# Patient Record
Sex: MALE | Race: WHITE | Employment: OTHER | URBAN - METROPOLITAN AREA
[De-identification: names, ages, dates, MRNs, and addresses within clinical notes are randomized per-mention and may not be internally consistent; named-entity substitution may affect disease eponyms.]

---

## 2017-01-05 ENCOUNTER — THERAPY VISIT (OUTPATIENT)
Dept: PHYSICAL THERAPY | Facility: CLINIC | Age: 82
End: 2017-01-05
Payer: MEDICARE

## 2017-01-05 DIAGNOSIS — M54.50 ACUTE RIGHT-SIDED LOW BACK PAIN WITHOUT SCIATICA: Primary | ICD-10-CM

## 2017-01-05 PROCEDURE — 97110 THERAPEUTIC EXERCISES: CPT | Mod: GP | Performed by: PHYSICAL THERAPIST

## 2017-01-05 PROCEDURE — 97112 NEUROMUSCULAR REEDUCATION: CPT | Mod: GP | Performed by: PHYSICAL THERAPIST

## 2017-01-06 ENCOUNTER — TELEPHONE (OUTPATIENT)
Dept: FAMILY MEDICINE | Facility: CLINIC | Age: 82
End: 2017-01-06

## 2017-01-06 ENCOUNTER — OFFICE VISIT (OUTPATIENT)
Dept: FAMILY MEDICINE | Facility: CLINIC | Age: 82
End: 2017-01-06
Payer: MEDICARE

## 2017-01-06 VITALS
HEART RATE: 82 BPM | TEMPERATURE: 97.9 F | SYSTOLIC BLOOD PRESSURE: 124 MMHG | DIASTOLIC BLOOD PRESSURE: 82 MMHG | WEIGHT: 161.9 LBS | OXYGEN SATURATION: 100 % | BODY MASS INDEX: 26.02 KG/M2 | HEIGHT: 66 IN

## 2017-01-06 DIAGNOSIS — I10 BENIGN ESSENTIAL HYPERTENSION: ICD-10-CM

## 2017-01-06 DIAGNOSIS — E11.51 TYPE 2 DIABETES MELLITUS WITH DIABETIC PERIPHERAL ANGIOPATHY WITHOUT GANGRENE, WITHOUT LONG-TERM CURRENT USE OF INSULIN (H): ICD-10-CM

## 2017-01-06 DIAGNOSIS — G31.84 MILD COGNITIVE IMPAIRMENT: ICD-10-CM

## 2017-01-06 DIAGNOSIS — F32.A CHRONIC DEPRESSIVE DISORDER: Primary | ICD-10-CM

## 2017-01-06 DIAGNOSIS — K85.90 ACUTE PANCREATITIS, UNSPECIFIED COMPLICATION STATUS, UNSPECIFIED PANCREATITIS TYPE: ICD-10-CM

## 2017-01-06 DIAGNOSIS — I71.40 ABDOMINAL AORTIC ANEURYSM (AAA) WITHOUT RUPTURE (H): Chronic | ICD-10-CM

## 2017-01-06 PROCEDURE — 99214 OFFICE O/P EST MOD 30 MIN: CPT | Performed by: INTERNAL MEDICINE

## 2017-01-06 RX ORDER — DULOXETIN HYDROCHLORIDE 30 MG/1
CAPSULE, DELAYED RELEASE ORAL
Qty: 180 CAPSULE | Refills: 3 | OUTPATIENT
Start: 2017-01-06

## 2017-01-06 RX ORDER — DULOXETIN HYDROCHLORIDE 30 MG/1
30 CAPSULE, DELAYED RELEASE ORAL DAILY
Qty: 30 CAPSULE | Refills: 3 | Status: SHIPPED | OUTPATIENT
Start: 2017-01-06 | End: 2017-01-06

## 2017-01-06 RX ORDER — DULOXETIN HYDROCHLORIDE 30 MG/1
30 CAPSULE, DELAYED RELEASE ORAL DAILY
Qty: 30 CAPSULE | Refills: 3 | Status: SHIPPED | OUTPATIENT
Start: 2017-01-06 | End: 2017-01-20

## 2017-01-06 RX ORDER — DULOXETIN HYDROCHLORIDE 30 MG/1
60 CAPSULE, DELAYED RELEASE ORAL DAILY
Qty: 30 CAPSULE | Refills: 3 | Status: SHIPPED | OUTPATIENT
Start: 2017-01-06 | End: 2017-01-06

## 2017-01-06 NOTE — PROGRESS NOTES
SUBJECTIVE:                                                    Dustin Pearce is a 88 year old male who presents to clinic today for the following health issues:          Hospital Follow-up Visit:    Hospital/Nursing Home/IP Rehab Facility: [facility]  Date of Admission: 12-  Date of Discharge: 12-  Reason(s) for Admission: Essential hypertension, benign  - Primary, Acute pancreatitis, unspecified complication status, unspecified pancreatitis type, Other acute gastritis without hemorrhage             Problems taking medications regularly:  None       Medication changes since discharge: None       Problems adhering to non-medication therapy:  None    Summary of hospitalization:  [facility] discharge summary reviewed  Diagnostic Tests/Treatments reviewed.  Follow up needed: blood pressure management, follow up aortic aneurysm   Other Healthcare Providers Involved in Patient s Care:         None  Update since discharge: improved.     Post Discharge Medication Reconciliation: discharge medications reconciled and changed, per note/orders (see AVS).  Plan of care communicated with patient     Coding guidelines for this visit:  Type of Medical   Decision Making Face-to-Face Visit       within 7 Days of discharge Face-to-Face Visit        within 14 days of discharge   Moderate Complexity [id number]   High Complexity [id number]            This is a pleasant 88-year-old man with a history of mild cognitive impairment he also has multiple other medical problems including type 2 diabetes, hypertension, hyperlipidemia, chronic depressive disorder, recently diagnosed abdominal aortic aneurysm. He was recently hospitalized for abdominal pain. The etiology of his abdominal pain was difficult to assess. There was some consideration given to acute pancreatitis as the potential etiology of his pain although he was also started on higher dose proton pump inhibitor therapy due to concerns for  possible gastritis. His lipase level was only mildly elevated. He is feeling better now and denies current abdominal pain, nausea or vomiting. His ramipril was discontinued because of concern that it may be contributing to pancreatitis? He was started on metoprolol because of concern for the newly diagnosed abdominal aortic aneurysm. He establish care with me several months ago at which point he was taking tramadol and amitriptyline. Given his cognitive dysfunction and his age, I did not think that either medication was a very good choice for him. I asked him to discontinue tramadol and amitriptyline. Since stopping amitriptyline, he has been feeling more depressed. He also describes pain in multiple regions of his body that has been present for many years.    Problem list and histories reviewed & adjusted, as indicated.  Additional history: as documented    Patient Active Problem List   Diagnosis     Coronary artery disease involving native coronary artery of native heart without angina pectoris     Type 2 diabetes mellitus with diabetic peripheral angiopathy without gangrene, without long-term current use of insulin (H)     Mild cognitive impairment     Hyperlipidemia LDL goal <70     Nephrolithiasis     Benign non-nodular prostatic hyperplasia with lower urinary tract symptoms     Gastroesophageal reflux disease without esophagitis     Cerebrovascular accident (H)     Carotid artery stenosis     Abdominal aortic aneurysm (H)     Acute pancreatitis     Lumbar back pain     Acute right-sided low back pain without sciatica     Benign essential hypertension     Past Surgical History   Procedure Laterality Date     Cholecystectomy       Hernia repair       Genitourinary surgery       KIDNEY STONE REMOVAL X 4     Laser holmium lithotripsy ureter(s), insert stent, combined  3/2/2012     Procedure:COMBINED CYSTOSCOPY, URETEROSCOPY, LASER HOLMIUM LITHOTRIPSY URETER(S), INSERT STENT; CYSTOSCOPY, RIGHT RETROGRADES, RIGHT  URETEROSCOPY, HOLMIUM LASER, RIGHT STENT PLACEMENT; Surgeon:ANJELICA LEÓN; Location: OR     Rotator cuff repair rt/lt       Esophagoscopy, gastroscopy, duodenoscopy (egd), combined N/A 12/26/2016     Procedure: COMBINED ESOPHAGOSCOPY, GASTROSCOPY, DUODENOSCOPY (EGD);  Surgeon: Nasim Louis MD;  Location:  GI     Hc ugi endoscopy w eus N/A 12/29/2016     Procedure: COMBINED ENDOSCOPIC ULTRASOUND, ESOPHAGOSCOPY, GASTROSCOPY, DUODENOSCOPY (EGD);  Surgeon: Nasim Louis MD;  Location:  GI       Social History   Substance Use Topics     Smoking status: Former Smoker -- 1.00 packs/day for 30 years     Types: Cigarettes     Quit date: 01/01/1999     Smokeless tobacco: Never Used     Alcohol Use: 4.2 oz/week     7 Standard drinks or equivalent per week     Family History   Problem Relation Age of Onset     Heart Failure Father 81     Breast Cancer Mother          Current Outpatient Prescriptions   Medication Sig Dispense Refill     DULoxetine (CYMBALTA) 30 MG EC capsule Take 2 capsules (60 mg) by mouth daily 30 capsule 3     metoprolol (LOPRESSOR) 50 MG tablet Take 1 tablet (50 mg) by mouth 2 times daily 60 tablet 1     omeprazole (PRILOSEC) 40 MG capsule Take 1 capsule (40 mg) by mouth daily Take 30-60 minutes before a meal. 90 capsule 0     glipiZIDE (GLIPIZIDE XL) 10 MG 24 hr tablet Take 1 tablet (10 mg) by mouth daily 90 tablet 3     TAMSULOSIN HCL PO Take 0.4 mg by mouth daily        METFORMIN HCL PO Take 1,000 mg by mouth 2 times daily (with meals)       ATORVASTATIN CALCIUM PO Take 80 mg by mouth daily       blood glucose monitoring (NO BRAND SPECIFIED) test strip Use to test blood sugar three times daily or as directed. 300 each 3     order for DME Equipment being ordered: True Matrix Blood Glucose meter. 1 Device 0     Psyllium (METAMUCIL PO) Take 1 packet by mouth daily        VITAMIN D, CHOLECALCIFEROL, PO Take 1,000 Units by mouth daily        Clopidogrel Bisulfate, [id number],  "(PLAVIX PO) Take 75 mg by mouth daily        Allergies   Allergen Reactions     Oxycodone Other (See Comments)     \"TERRIBLE SWEATING\"     Sulfa Drugs      Tetracycline        ROS:  Denies fevers, chills, he is seeing podiatry for chronic foot pain, no blood in stools or melena    OBJECTIVE:                                                    /82 mmHg  Pulse 82  Temp(Src) 97.9  F (36.6  C) (Oral)  Ht 5' 6\" (1.676 m)  Wt 161 lb 14.4 oz (73.437 kg)  BMI 26.14 kg/m2  SpO2 100%  Body mass index is 26.14 kg/(m^2).  Gen.: This is a well-appearing elderly man in no acute distress. He does not appear toxic. Abdomen: Soft, nondistended, nontender, bowel sounds present. Mental status: Well groomed, normal affect, describes depressed mood, he is filling out a PHQ 9 form at the time of this dictation. He denies suicidal ideation. He has difficulty articulating his mood symptoms. Overall, I believe the history is challenging due to possible progression of his cognitive deficits.    Diagnostic Test Results:  Results for orders placed or performed during the hospital encounter of 12/24/16   UPPER GI ENDOSCOPY   Result Value Ref Range    Upper GI Endoscopy       [facility] Department  _______________________________________________________________________________  Patient Name: Dustin Pearce         Procedure Date: 12/26/2016 2:53 PM  MRN: [id number]                       Account Number: [id number]  YOB: 1928              Admit Type: Inpatient  Age: 88                               Room: 1                          Note Status: Finalized                Attending MD: Nasim Louis MD  Instrument Name: 313 GIF- Gastroscope   _______________________________________________________________________________     Procedure:           Upper GI endoscopy  Indications:         Epigastric abdominal pain, Dyspepsia  Providers:           Nasim Louis MD, Sruthi Vallejo RN, " Daylin Davis RN  Referring MD:          Medicines:           Midazolam 1 mg IV, Fentanyl 25 micrograms IV, Cetacaine                        spray  Complications:       No immediate complications.  ___ ____________________________________________________________________________  Procedure:           Pre-Anesthesia Assessment:                       - Prior to the procedure, a History and Physical was                        performed, and patient medications and allergies were                        reviewed. The patient is competent. The risks and                        benefits of the procedure and the sedation options and                        risks were discussed with the patient. All questions                        were answered and informed consent was obtained. Patient                        identification and proposed procedure were verified by                        the physician in the pre-procedure area. Mental Status                        Examination: alert and oriented. Airway Examination:                        normal oropharyngeal airway and neck mobility.                        Respiratory Examination: clear to auscultation. CV                        Examination: nor mal. Prophylactic Antibiotics: The                        patient does not require prophylactic antibiotics. Prior                        Anticoagulants: The patient has taken no previous                        anticoagulant or antiplatelet agents. ASA Grade                        Assessment: II - A patient with mild systemic disease.                        After reviewing the risks and benefits, the patient was                        deemed in satisfactory condition to undergo the                        procedure. The anesthesia plan was to use moderate                        sedation / analgesia (conscious sedation). Immediately                        prior to administration of medications, the patient  was                        re-assessed for adequacy to receive sedatives. The heart                        rate, respiratory rate, oxygen saturations, blood                        pressure, adequacy of pulmonary ventilation, and                        response to care were monitored th roughout the                        procedure. The physical status of the patient was                        re-assessed after the procedure.                       After obtaining informed consent, the endoscope was                        passed under direct vision. Throughout the procedure,                        the patient's blood pressure, pulse, and oxygen                        saturations were monitored continuously. The Endoscope                        was introduced through the mouth, and advanced to the                        second part of duodenum. The upper GI endoscopy was                        accomplished without difficulty. The patient tolerated                        the procedure well.                                                                                   Findings:       The examined esophagus was normal.       Diffuse mildly erythematous mucosa without bleeding was found in the        gastric antrum.       The cardia and gastric fundus were normal on ret roflexion.       The duodenal bulb, first part of the duodenum and 2nd part of the        duodenum were normal.                                                                                   Impression:          - Normal esophagus.                       - Erythematous mucosa in the antrum.                       - Normal duodenal bulb, first part of the duodenum and                        2nd part of the duodenum.  Recommendation:      - Anti Relux Precautions                       - Use Protonix (pantoprazole) 40 mg PO daily.                       - Return patient to hospital [facility].                                                                                      __________________  Nasim Louis MD  12/26/2016 4:06 PM  Number of Addenda: 0    Note Initiated On: 12/26/2016 2:53 PM  Scope Withdrawal Time: 0 hours 0 minutes 0 seconds   Total Procedure Duration: 0 hours 2 minutes 7 seconds      UPPER EUS   Result Value Ref Range    Upper EUS       [facility] Endoscopy Department  _______________________________________________________________________________  Patient Name: Dustin Pearce         Procedure Date: 12/29/2016 8:27 AM  MRN: [id number]                       Account Number: [id number]  YOB: 1928              Admit Type: Inpatient  Age: 88                               Room: special procedure room #1  Note Status: Finalized                Attending MD: Nasim Louis MD  Instrument Name: 328 [id number] Linear EUS   _______________________________________________________________________________     Procedure:                Upper EUS  Indications:              Abnormal abdominal/pelvic CT scan, Epigastric                             abdominal pain  Providers:                Nasim Louis MD, Chloe Carvalho RN  Referring MD:               Medicines:                Monitored Anesthesia Care  Complications:            No immediate complications.  ______________________________________ _________________________________________  Procedure:                Pre-Anesthesia Assessment:                            - Prior to the procedure, a History and Physical                             was performed, and patient medications and                             allergies were reviewed. The patient is competent.                             The risks and benefits of the procedure and the                             sedation options and risks were discussed with the                             patient. All questions were answered and informed                             consent was obtained. Patient identification and                              proposed procedure were verified by in the                             pre-procedure area. Mental Status Examination:                             alert and oriented. Airway Examination: normal                             oropharyngeal airway and neck mobility. Respiratory                             Examination: clear to auscultation. CV  Examination:                             normal. Prophylactic Antibiotics: The patient does                             not require prophylactic antibiotics. Prior                             Anticoagulants: The patient has taken no previous                             anticoagulant or antiplatelet agents. ASA Grade                             Assessment: III - A patient with severe systemic                             disease. After reviewing the risks and benefits,                             the patient was deemed in satisfactory condition to                             undergo the procedure. The anesthesia plan was to                             use monitored anesthesia care (MAC). Immediately                             prior to administration of medications, the patient                             was re-assessed for adequacy to receive sedatives.                             The heart rate, respiratory rate, oxygen                             saturations, blood pressure, adequacy  of pulmonary                             ventilation, and response to care were monitored                             throughout the procedure. The physical status of                             the patient was re-assessed after the procedure.                            After obtaining informed consent, the endoscope was                             passed under direct vision. Throughout the                             procedure, the patient's blood pressure, pulse, and                             oxygen saturations were monitored continuously. The                              Endosonoscope was introduced through the mouth, and                             advanced to the third part of duodenum. The upper                             EUS was accomplished without difficulty. The                             patient tolerated the procedure well.                                                                                   Findings:       Endosonographic Finding :       There was no sign  of significant endosonographic abnormality in the        esophagus.       Endosonographic images of the stomach were unremarkable.       There was no sign of significant endosonographic abnormality in the        duodenal bulb, in the apex of the duodenal bulb and in the second        portion of the duodenum.       There was no sign of significant endosonographic abnormality in the        common bile duct. CBD was 5 mm       There was no sign of significant endosonographic abnormality in the        entire pancreas. PD was 2.2 mm       Two benign-appearing lymph nodes were visualized in the celiac region        (level 20). The largest measured 4 mm by 7 mm in maximal cross-sectional        diameter. The nodes were oval, hypoechoic and had well defined margins.       Multiple renal cysts seen.                                                                                   Impression:               - There was no sign of significant pathology in the                             esophagus.                             - Endosonographic images of the stomach were                             unremarkable.                            - There was no sign of significant pathology in the                             duodenal bulb, in the apex of the duodenal bulb and                             in the second portion of the duodenum.                            - There was no sign of significant pathology in the                             common bile duct.                            - There was no sign of significant  pathology in the                             entire pancreas.                            - Two benign lymph nodes were visualized in the                             celiac region (level 20). Tissue has not been                             obtained. However, the endosonographic appearance                             is benign inflammatory changes.  Recommendation:           - Please continue to follow with Primary care                             Physician.                             - Return patient to hospital am[facility].                                                                                   Procedure Code(s):       --- Professional ---       37778, Esophagogastroduodenoscopy, flexible, transoral; with endoscopic        ultrasound examination, including the esophagus, stomach, and either the        duodenum or a surgically altered stomach where the jejunum is examined        distal to the anastomosis  Diagnosis Code(s):       --- Professional ---       I89.9, Noninfective disorder of lymphatic vessels and lymph nodes,        unspecified       R93.5, Abnormal findings on diagnostic imaging of other abdominal        regions, including retroperitoneum       R10.13, Epigastric pain    CPT copyright 2013 [employer]. All rights reserved.    The codes documented in this report are preliminary and upon  review may   be revised to meet current compliance requirements.    __________________  Nasim Louis MD  12/29/2016 9:59 AM   I was physically present for the entire viewing portion of the exam.  Nasim Louis MD  Number of Addenda: 0    Note Initiated On: 12/29/2016 8:27 AM  MRN:                      [id number]  Procedure Date:           12/29/2016 8:27:04 AM  Scope Withdrawal Time: 0 hours 0 minutes 0 seconds   Total Procedure Duration: 0 hours 16 minutes 25 seconds   Estimated Blood Loss:       Scope In: 8:45:14 AM  Scope Out: 9:01:39 AM     CT Abdomen Pelvis w Contrast    Narrative    CT  ABDOMEN PELVIS WITH CONTRAST   12/25/2016 1:12 AM      HISTORY:  Epigastric abdominal pain.    TECHNIQUE:  CT abdomen and pelvis with intravenous contrast. Radiation  dose for this scan was reduced using automated exposure control,  adjustment of the mA and/or kV according to patient size, or iterative  reconstruction technique. 81mL Isovue-370     COMPARISON:  11/14/2013.    FINDINGS:  Abdomen:  There is atelectasis and scarring at the lung bases. The  heart size is normal. The liver and spleen are normal in appearance.  There is fluid stranding about the head of the pancreas extending into  the mesentery consistent with acute pancreatitis. The pancreas  otherwise appears normal. No focal fluid collection to suggest  pseudocyst. The adrenal glands are normal in appearance. There are a  few renal cortical cysts and renal stones bilaterally. No ureteral  stone or hydronephrosis. There is atherosclerotic calcification of the  aorta and its branches. The mid abdominal aorta is mildly aneurysmal  at 3.2 cm AP.    Pelvis: There are colonic diverticula without acute diverticulitis. No  bowel obstruction or inflammation. No free intraperitoneal gas or  fluid. 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      Impression    IMPRESSION:  1. Probable acute pancreatitis of the pancreas head. No pseudocyst.  2. Colonic diverticula without acute diverticulitis.  3. Bilateral renal stones. No ureteral stone or hydronephrosis.  4. 3.2 cm abdominal aortic aneurysm.    WILFRED SALAZAR MD   US Abdomen Limited    Narrative    US ABDOMEN LIMITED  12/25/2016 1:53 PM     HISTORY: Pancreatitis.    COMPARISON: CT scan from 12/25/2016.    FINDINGS: Gallbladder is surgically absent. Common duct is normal at  0.5 cm. Liver is normal in size and echotexture. Visualized portions  of the pancreas are unremarkable. There is pancreatitis present on the  CT scan. Right kidney is normal.      Impression    IMPRESSION: The area of pancreatitis seen on the CT  scan is not yet  detectable by ultrasound. Otherwise unremarkable.    JAMES ZEPEDA MD   CBC with platelets differential   Result Value Ref Range    WBC 10.2 4.0 - 11.0 10e9/L    RBC Count 4.15 (L) 4.4 - 5.9 10e12/L    Hemoglobin 13.1 (L) 13.3 - 17.7 g/dL    Hematocrit 39.4 (L) 40.0 - 53.0 %    MCV 95 78 - 100 fl    MCH 31.6 26.5 - 33.0 pg    MCHC 33.2 31.5 - 36.5 g/dL    RDW 13.2 10.0 - 15.0 %    Platelet Count 156 150 - 450 10e9/L    Diff Method Automated Method     % Neutrophils 69.2 %    % Lymphocytes 19.9 %    % Monocytes 9.9 %    % Eosinophils 0.7 %    % Basophils 0.1 %    % Immature Granulocytes 0.2 %    Nucleated RBCs 0 0 /100    Absolute Neutrophil 7.0 1.6 - 8.3 10e9/L    Absolute Lymphocytes 2.0 0.8 - 5.3 10e9/L    Absolute Monocytes 1.0 0.0 - 1.3 10e9/L    Absolute Eosinophils 0.1 0.0 - 0.7 10e9/L    Absolute Basophils 0.0 0.0 - 0.2 10e9/L    Abs Immature Granulocytes 0.0 0 - 0.4 10e9/L    Absolute Nucleated RBC 0.0    Comprehensive metabolic panel   Result Value Ref Range    Sodium 141 133 - 144 mmol/L    Potassium 3.8 3.4 - 5.3 mmol/L    Chloride 104 94 - 109 mmol/L    Carbon Dioxide 29 20 - 32 mmol/L    Anion Gap 8 3 - 14 mmol/L    Glucose 150 (H) 70 - 99 mg/dL    Urea Nitrogen 17 7 - 30 mg/dL    Creatinine 0.77 0.66 - 1.25 mg/dL    GFR Estimate >90  Non  GFR Calc   >60 mL/min/1.7m2    GFR Estimate If Black >90   GFR Calc   >60 mL/min/1.7m2    Calcium 8.7 8.5 - 10.1 mg/dL    Bilirubin Total 0.6 0.2 - 1.3 mg/dL    Albumin 3.7 3.4 - 5.0 g/dL    Protein Total 7.1 6.8 - 8.8 g/dL    Alkaline Phosphatase 58 40 - 150 U/L    ALT 21 0 - 70 U/L    AST 19 0 - 45 U/L   Lipase   Result Value Ref Range    Lipase 483 (H) 73 - 393 U/L   Troponin I   Result Value Ref Range    Troponin I ES 0.022 0.000 - 0.045 ug/L   UA with Microscopic   Result Value Ref Range    Color Urine Light Yellow     Appearance Urine Clear     Glucose Urine Negative NEG mg/dL    Bilirubin Urine Negative NEG     Ketones Urine 10 (A) NEG mg/dL    Specific Gravity Urine 1.042 (H) 1.003 - 1.035    Blood Urine Negative NEG    pH Urine 6.0 5.0 - 7.0 pH    Protein Albumin Urine Negative NEG mg/dL    Urobilinogen mg/dL 2.0 0.0 - 2.0 mg/dL    Nitrite Urine Negative NEG    Leukocyte Esterase Urine Negative NEG    Source Midstream Urine     WBC Urine <1 0 - 2 /HPF    RBC Urine 1 0 - 2 /HPF    Squamous Epithelial /HPF Urine <1 0 - 1 /HPF    Mucous Urine Present (A) NEG /LPF   Creatinine POCT   Result Value Ref Range    Creatinine 0.7 0.66 - 1.25 mg/dL    GFR Estimate >90 >60 mL/min/1.7m2    GFR Estimate If Black >90 >60 mL/min/1.7m2   Lipase   Result Value Ref Range    Lipase 269 73 - 393 U/L   Glucose by meter   Result Value Ref Range    Glucose 152 (H) 70 - 99 mg/dL   Glucose by meter   Result Value Ref Range    Glucose 176 (H) 70 - 99 mg/dL   Glucose by meter   Result Value Ref Range    Glucose 186 (H) 70 - 99 mg/dL   Glucose by meter   Result Value Ref Range    Glucose 174 (H) 70 - 99 mg/dL   Glucose by meter   Result Value Ref Range    Glucose 250 (H) 70 - 99 mg/dL   Glucose by meter   Result Value Ref Range    Glucose 179 (H) 70 - 99 mg/dL   Basic metabolic panel   Result Value Ref Range    Sodium 135 133 - 144 mmol/L    Potassium 5.0 3.4 - 5.3 mmol/L    Chloride 103 94 - 109 mmol/L    Carbon Dioxide 28 20 - 32 mmol/L    Anion Gap 4 3 - 14 mmol/L    Glucose 213 (H) 70 - 99 mg/dL    Urea Nitrogen 16 7 - 30 mg/dL    Creatinine 0.86 0.66 - 1.25 mg/dL    GFR Estimate 84 >60 mL/min/1.7m2    GFR Estimate If Black >90   GFR Calc   >60 mL/min/1.7m2    Calcium 8.2 (L) 8.5 - 10.1 mg/dL   CBC with platelets differential   Result Value Ref Range    WBC 11.5 (H) 4.0 - 11.0 10e9/L    RBC Count 3.69 (L) 4.4 - 5.9 10e12/L    Hemoglobin 11.8 (L) 13.3 - 17.7 g/dL    Hematocrit 35.4 (L) 40.0 - 53.0 %    MCV 96 78 - 100 fl    MCH 32.0 26.5 - 33.0 pg    MCHC 33.3 31.5 - 36.5 g/dL    RDW 13.3 10.0 - 15.0 %    Platelet Count 130 (L)  150 - 450 10e9/L    Diff Method Automated Method     % Neutrophils 77.8 %    % Lymphocytes 13.7 %    % Monocytes 8.1 %    % Eosinophils 0.1 %    % Basophils 0.1 %    % Immature Granulocytes 0.2 %    Nucleated RBCs 0 0 /100    Absolute Neutrophil 9.0 (H) 1.6 - 8.3 10e9/L    Absolute Lymphocytes 1.6 0.8 - 5.3 10e9/L    Absolute Monocytes 0.9 0.0 - 1.3 10e9/L    Absolute Eosinophils 0.0 0.0 - 0.7 10e9/L    Absolute Basophils 0.0 0.0 - 0.2 10e9/L    Abs Immature Granulocytes 0.0 0 - 0.4 10e9/L    Absolute Nucleated RBC 0.0    Lipase   Result Value Ref Range    Lipase 102 73 - 393 U/L   Hemoglobin A1c   Result Value Ref Range    Hemoglobin A1C 7.9 (H) 4.3 - 6.0 %   Glucose by meter   Result Value Ref Range    Glucose 214 (H) 70 - 99 mg/dL   Glucose by meter   Result Value Ref Range    Glucose 155 (H) 70 - 99 mg/dL   Glucose by meter   Result Value Ref Range    Glucose 167 (H) 70 - 99 mg/dL   Glucose by meter   Result Value Ref Range    Glucose 169 (H) 70 - 99 mg/dL   Glucose by meter   Result Value Ref Range    Glucose 177 (H) 70 - 99 mg/dL   CRP inflammation   Result Value Ref Range    CRP Inflammation 136.0 (H) 0.0 - 8.0 mg/L   CBC (AM Draw)   Result Value Ref Range    WBC 12.7 (H) 4.0 - 11.0 10e9/L    RBC Count 4.04 (L) 4.4 - 5.9 10e12/L    Hemoglobin 12.7 (L) 13.3 - 17.7 g/dL    Hematocrit 38.5 (L) 40.0 - 53.0 %    MCV 95 78 - 100 fl    MCH 31.4 26.5 - 33.0 pg    MCHC 33.0 31.5 - 36.5 g/dL    RDW 13.2 10.0 - 15.0 %    Platelet Count 174 150 - 450 10e9/L   Comprehensive metabolic panel   Result Value Ref Range    Sodium 138 133 - 144 mmol/L    Potassium 5.3 3.4 - 5.3 mmol/L    Chloride 104 94 - 109 mmol/L    Carbon Dioxide 27 20 - 32 mmol/L    Anion Gap 7 3 - 14 mmol/L    Glucose 203 (H) 70 - 99 mg/dL    Urea Nitrogen 15 7 - 30 mg/dL    Creatinine 0.72 0.66 - 1.25 mg/dL    GFR Estimate >90  Non  GFR Calc   >60 mL/min/1.7m2    GFR Estimate If Black >90   GFR Calc   >60 mL/min/1.7m2     Calcium 8.4 (L) 8.5 - 10.1 mg/dL    Bilirubin Total 0.7 0.2 - 1.3 mg/dL    Albumin 2.8 (L) 3.4 - 5.0 g/dL    Protein Total 6.5 (L) 6.8 - 8.8 g/dL    Alkaline Phosphatase 90 40 - 150 U/L    ALT 50 0 - 70 U/L    AST 43 0 - 45 U/L   Glucose by meter   Result Value Ref Range    Glucose 243 (H) 70 - 99 mg/dL   Glucose by meter   Result Value Ref Range    Glucose 180 (H) 70 - 99 mg/dL   Glucose by meter   Result Value Ref Range    Glucose 185 (H) 70 - 99 mg/dL   Glucose by meter   Result Value Ref Range    Glucose 140 (H) 70 - 99 mg/dL   Glucose by meter   Result Value Ref Range    Glucose 158 (H) 70 - 99 mg/dL   Glucose by meter   Result Value Ref Range    Glucose 138 (H) 70 - 99 mg/dL   Glucose by meter   Result Value Ref Range    Glucose 138 (H) 70 - 99 mg/dL   Glucose by meter   Result Value Ref Range    Glucose 252 (H) 70 - 99 mg/dL   Glucose by meter   Result Value Ref Range    Glucose 136 (H) 70 - 99 mg/dL   Glucose by meter   Result Value Ref Range    Glucose 202 (H) 70 - 99 mg/dL   Glucose by meter   Result Value Ref Range    Glucose 149 (H) 70 - 99 mg/dL   Glucose by meter   Result Value Ref Range    Glucose 167 (H) 70 - 99 mg/dL   Glucose by meter   Result Value Ref Range    Glucose 150 (H) 70 - 99 mg/dL   EKG 12 lead   Result Value Ref Range    Interpretation ECG Click View Image link to view waveform and result    Gastroenterology IP Consult: epigastric pain with nausea please asess the need for EGD; Consultant may enter orders: Yes; Patient to be seen: Routine - within 24 hours    Nasim Villegas MD     12/26/2016  7:06 PM  [facility]  Gastroenterology Consultation         Dustin Pearce  [address]  [place] [address]  88 year old male    Admission Date/Time: 12/24/2016  Primary Care Provider: Matt Morrissey  Referring / Attending Physician:  Dr. Chen     We were asked to see the patient in consultation by Dr. Chen for   evaluation of Abdominal pain,  Pancreatitis.      CC: Acute pancreatitis, Abdominal pain    HPI:  Dustin Pearce is an 88-year-old male patient with past   history of diabetes mellitus type 2, coronary artery disease,   hypertension, peripheral arterial disease, stroke history,   dyslipidemia, GERD, nephrolithiasis, who presents with abdominal   pain, and found to have findings suggesting acute pancreatitis.   Patient developed sever epigastric pain which was sudden in   onset. Patient has been dealing with chronic upper back pain due   to muscle spasm for 4 weeks. Denying h/o ETOH, fever, chills. S/P   cholecystectomy.    ROS: A comprehensive ten point review of systems was negative   aside from those in mentioned in the HPI.      PAST MED HX:  I have reviewed this patient's medical history and updated it   with pertinent information if needed.   Past Medical History   Diagnosis Date     CAD (coronary artery disease)      Hypertension      Hyperlipidemia      DJD (degenerative joint disease)      Osteopenia      Nephrolithiasis      RECURRENT     Carotid occlusion, left      Diabetes mellitus (H)      PONV (postoperative nausea and vomiting)      Heart attack (H)      Unspecified cerebral artery occlusion with cerebral infarction        Gastro-oesophageal reflux disease      Coronary artery disease involving native coronary artery of   native heart without angina pectoris 10/20/2016     Mild cognitive impairment 10/20/2016     Benign non-nodular prostatic hyperplasia with lower urinary   tract symptoms 10/20/2016     Gastroesophageal reflux disease without esophagitis 10/20/2016     Carotid artery stenosis 10/20/2016       MEDICATIONS:   Prior to Admission Medications   Prescriptions Last Dose Informant Patient Reported? Taking?   ATORVASTATIN CALCIUM PO 12/24/2016 at Unknown time  Yes Yes   Sig: Take 80 mg by mouth daily   Clopidogrel Bisulfate, [id number], (PLAVIX PO) 12/24/2016 at   Unknown time  Yes Yes   Sig: Take 75 mg by mouth daily   "  METFORMIN HCL PO 12/24/2016 at Unknown time  Yes Yes   Sig: Take 1,000 mg by mouth 2 times daily (with meals)   Omeprazole (PRILOSEC PO) 12/24/2016 at Unknown time  Yes Yes   Sig: Take 20 mg by mouth every morning    Psyllium (METAMUCIL PO) 12/24/2016 at Unknown time  Yes Yes   Sig: Take 1 packet by mouth daily    Ramipril (ALTACE PO) 12/24/2016 at Unknown time  Yes Yes   Sig: Take 2.5 mg by mouth daily    TAMSULOSIN HCL PO 12/24/2016 at Unknown time  Yes Yes   Sig: Take 0.4 mg by mouth daily    VITAMIN D, CHOLECALCIFEROL, PO 12/24/2016 at Unknown time  Yes   Yes   Sig: Take 1,000 Units by mouth daily    blood glucose monitoring (NO BRAND SPECIFIED) test strip   No No   Sig: Use to test blood sugar three times daily or as directed.   glipiZIDE (GLIPIZIDE XL) 10 MG 24 hr tablet 12/24/2016 at Unknown   time  No Yes   Sig: Take 1 tablet (10 mg) by mouth daily   naproxen (NAPROSYN) 500 MG tablet Past Month at Unknown time  No   Yes   Sig: Take 1 tablet (500 mg) by mouth 2 times daily as needed for   moderate pain   order for DME   No No   Sig: Equipment being ordered: True Matrix Blood Glucose meter.      Facility-Administered Medications: None       ALLERGIES:   Allergies   Allergen Reactions     Oxycodone Other (See Comments)     \"TERRIBLE SWEATING\"     Sulfa Drugs      Tetracycline        SOCIAL HISTORY:  Social History   Substance Use Topics     Smoking status: Former Smoker -- 1.00 packs/day for 30 years     Types: Cigarettes     Quit date: 01/01/1999     Smokeless tobacco: Never Used     Alcohol Use: 4.2 oz/week     7 Standard drinks or equivalent per week       FAMILY HISTORY:  Family History   Problem Relation Age of Onset     Heart Failure Father 81     Breast Cancer Mother        PHYSICAL EXAM:   General  Awake, alert oriented  Vital Signs with Ranges  Temp: 96.8  F (36  C) Temp src: Oral BP: 144/64 mmHg   Heart   Rate: 63 Resp: 16 SpO2: 98 % O2 Device: Nasal cannula Oxygen   Delivery: 2 LPM  I/O last 3 " completed shifts:  In: 120 [P.O.:120]  Out: 600 [Urine:400; Emesis/NG output:200]    Constitutional: healthy, alert and no distress   Cardiovascular: negative, PMI normal. No lifts, heaves, or   thrills. RRR. No murmurs, clicks gallops or rub  Respiratory: negative, Percussion normal. Good diaphragmatic   excursion. Lungs clear  Head: Normocephalic. No masses, lesions, tenderness or   abnormalities  Neck: Neck supple. No adenopathy. Thyroid symmetric, normal   size,, Carotids without bruits.  Abdomen: Abdomen soft, non-tender. BS normal. No masses,   organomegaly  SKIN: no suspicious lesions or rashes  LYMPH: Normal cervical lymph nodes          ADDITIONAL COMMENTS:   I reviewed the patient's new clinical lab test results.   Recent Labs   Lab Test  12/26/16   0643  12/25/16   0002  10/20/16   1825   08/19/09   2100   WBC  11.5*  10.2  7.5   --   6.2   HGB  11.8*  13.1*  13.4   < >  13.8   MCV  96  95  95   --   93   PLT  130*  156  175   --   134*   INR   --    --    --    --   0.99    < > = values in this interval not displayed.     Recent Labs   Lab Test  12/26/16   0643  12/25/16   0002  10/20/16   1825   POTASSIUM  5.0  3.8  4.3   CHLORIDE  103  104  104   CO2  28  29  29   BUN  16  17  14   ANIONGAP  4  8  5     Recent Labs   Lab Test  12/26/16   0643  12/25/16   0655  12/25/16   0204  12/25/16   0002  10/20/16   1825  08/20/09   1348  08/19/09   2100   ALBUMIN   --    --    --   3.7  3.7   --   4.0   BILITOTAL   --    --    --   0.6  0.4   --   0.3   ALT   --    --    --   21 26   --   31   AST   --    --    --   19 17   --   33   PROTEIN   --    --   Negative   --    --   Negative   --    LIPASE  102  269   --   483*   --    --    --        I reviewed the patient's new imaging results.        CONSULTATION ASSESSMENT AND PLAN:    Active Problems:    Acute pancreatitis    Assessment:    88 year old male with h/o cute abdominal pain and CT consistent   with focal pancreatitis involving head of pancreas. CBD is  normal   size. No obvious risk factors. D/D still can include gallstone   pancreatitis, Idiopathic, Duodenal ulcer. Neoplastic process.   Patient is feeling better regarding pain today.      Plan: I will schedule for EGD to r/o PUD and duodenal ulcer.   If negative than I will start on clear liquid diet.   Advance as tolerated.   Repeat Labs tomorrow and schedule for GI f/U and EUS as out   patient    D/W patient and family plan in detail.        Nasim Louis MD, FACP  [facility].  Office: [phone]  Cell : [phone]          ASSESSMENT/PLAN:                                                            1. Chronic depressive disorder  In lieu of amitriptyline, and in light of his chronic pain complaints, try Cymbalta as below to address his mood  - DULoxetine (CYMBALTA) 30 MG EC capsule; Take 2 capsules (60 mg) by mouth daily  Dispense: 30 capsule; Refill: 3    2. Type 2 diabetes mellitus with diabetic peripheral angiopathy without gangrene, without long-term current use of insulin (H)  His hemoglobin A1c demonstrated better control in the hospital. Although, he admits that he is only taking 10 mg of glipizide some of the time and usually only taking 5 mg of glipizide for unclear reasons. I advised him and his wife to make sure that he takes the 10 mg dose on a daily basis.    3. Abdominal aortic aneurysm (AAA) without rupture (H)  Recheck abdominal imaging in one year time interval    4. Mild cognitive impairment  This may be progressing, monitor closely    5. Acute pancreatitis, unspecified complication status, unspecified pancreatitis type  If this in fact was the diagnosis for his abdominal pain, it has resolved    6. Benign essential hypertension  His second blood pressure reading in clinic today is okay despite stopping ramipril      FUTURE APPOINTMENTS:       - Follow-up visit in 3-4 weeks to assess therapy on Cymbalta    Matt Morrissey MD  [facility]

## 2017-01-06 NOTE — NURSING NOTE
"Chief Complaint   Patient presents with     Hospital F/U       Initial /69 mmHg  Pulse 82  Temp(Src) 97.9  F (36.6  C) (Oral)  Ht 5' 6\" (1.676 m)  Wt 161 lb 14.4 oz (73.437 kg)  BMI 26.14 kg/m2  SpO2 100% Estimated body mass index is 26.14 kg/(m^2) as calculated from the following:    Height as of this encounter: 5' 6\" (1.676 m).    Weight as of this encounter: 161 lb 14.4 oz (73.437 kg).  BP completed using cuff size: regular, left arm  Sandie Nieves MA  "

## 2017-01-06 NOTE — MR AVS SNAPSHOT
After Visit Summary   1/6/2017    Dustin Pearce    MRN: [id number]           Patient Information     Date Of Birth          1/31/1928        Visit Information        Provider Department      1/6/2017 2:30 PM Matt Morrissey MD [facility]        Today's Diagnoses     Chronic depressive disorder    -  1     Type 2 diabetes mellitus with diabetic peripheral angiopathy without gangrene, without long-term current use of insulin (H)         Abdominal aortic aneurysm (AAA) without rupture (H)         Mild cognitive impairment         Acute pancreatitis, unspecified complication status, unspecified pancreatitis type         Benign essential hypertension            Follow-ups after your visit        Follow-up notes from your care team     Return in about 4 weeks (around 2/3/2017) for Routine Visit.      Your next 10 appointments already scheduled     Jan 12, 2017  6:50 PM   [facility] Sp[facility] with Lesa Marin PT   [facility] ([facility]  )    [address] [id number] [phone]              Who to contact     If you have questions or need follow up information about today's clinic visit or your schedule please contact [facility] directly at [phone].  Normal or non-critical lab and imaging results will be communicated to you by MyChart, letter or phone within 4 business days after the clinic has received the results. If you do not hear from us within 7 days, please contact the clinic through MyChart or phone. If you have a critical or abnormal lab result, we will notify you by phone as soon as possible.  Submit refill requests through [employer] or call your pharmacy and they will forward the refill request to us. Please allow 3 business days for your refill to be completed.          Additional Information About Your Visit        Care EveryWhere ID     This is your Care EveryWhere ID. This could be used by  "other organizations to access your [facility] medical records  [id number]        Your Vitals Were     Pulse Temperature Height BMI (Body Mass Index) Pulse Oximetry       82 97.9  F (36.6  C) (Oral) 5' 6\" (1.676 m) 26.14 kg/m2 100%        Blood Pressure from Last 3 Encounters:   01/06/17 124/82   12/29/16 133/57   11/25/16 130/66    Weight from Last 3 Encounters:   01/06/17 161 lb 14.4 oz (73.437 kg)   12/27/16 162 lb 11.2 oz (73.8 kg)   11/25/16 165 lb (74.844 kg)              Today, you had the following     No orders found for display         Today's Medication Changes          These changes are accurate as of: 1/6/17  2:57 PM.  If you have any questions, ask your nurse or doctor.               Start taking these medicines.        Dose/Directions    DULoxetine 30 MG EC capsule   Commonly known as:  CYMBALTA   Used for:  Chronic depressive disorder   Started by:  Matt Morrissey MD        Dose:  30 mg   Take 1 capsule (30 mg) by mouth daily   Quantity:  30 capsule   Refills:  3         Stop taking these medicines if you haven't already. Please contact your care team if you have questions.     HYDROcodone-acetaminophen 5-325 MG per tablet   Commonly known as:  NORCO   Stopped by:  Matt Morrissey MD                Where to get your medicines      These medications were sent to [id number] - [facility] [address] AT [facility] [address]  [address], [facility] [address]    Hours:  24-hours Phone:  [phone]    - DULoxetine 30 MG EC capsule             Primary Care Provider Office Phone # Fax #    Matt Morrissey [phone]       [facility] [address]  [facility] [address]        Thank you!     Thank you for choosing [facility]  for your care. Our goal is always to provide you with excellent care. Hearing back from our patients is one way we can continue to improve our services. Please take a few minutes to complete the written " survey that you may receive in the mail after your visit with us. Thank you!             Your Updated Medication List - Protect others around you: Learn how to safely use, store and throw away your medicines at www.disposemymeds.org.          This list is accurate as of: 1/6/17  2:57 PM.  Always use your most recent med list.                   Brand Name Dispense Instructions for use    ATORVASTATIN CALCIUM PO      Take 80 mg by mouth daily       blood glucose monitoring test strip    no brand specified    300 each    Use to test blood sugar three times daily or as directed.       DULoxetine 30 MG EC capsule    CYMBALTA    30 capsule    Take 1 capsule (30 mg) by mouth daily       glipiZIDE 10 MG 24 hr tablet    glipiZIDE XL    90 tablet    Take 1 tablet (10 mg) by mouth daily       METAMUCIL PO      Take 1 packet by mouth daily       METFORMIN HCL PO      Take 1,000 mg by mouth 2 times daily (with meals)       metoprolol 50 MG tablet    LOPRESSOR    60 tablet    Take 1 tablet (50 mg) by mouth 2 times daily       omeprazole 40 MG capsule    priLOSEC    90 capsule    Take 1 capsule (40 mg) by mouth daily Take 30-60 minutes before a meal.       order for DME     1 Device    Equipment being ordered: True Matrix Blood Glucose meter.       PLAVIX PO      Take 75 mg by mouth daily       TAMSULOSIN HCL PO      Take 0.4 mg by mouth daily       VITAMIN D (CHOLECALCIFEROL) PO      Take 1,000 Units by mouth daily

## 2017-01-06 NOTE — TELEPHONE ENCOUNTER
Reason for Call:  Other call back    Detailed comments: [facility] pharmacy received multiple prescriptions for the same medication (duloxetine) and would like clarification    Phone Number Patient can be reached at: Home number on file [phone] (home)    Best Time:     Can we leave a detailed message on this number? YES    Call taken on 1/6/2017 at 5:01 PM by Yony Schwartz

## 2017-01-07 ASSESSMENT — PATIENT HEALTH QUESTIONNAIRE - PHQ9: SUM OF ALL RESPONSES TO PHQ QUESTIONS 1-9: 17

## 2017-01-09 NOTE — TELEPHONE ENCOUNTER
Dr. Morrissey,  Is the correct dosage 1 capsule daily? Or 2? Please advise so we can call Pharmacy

## 2017-01-12 ENCOUNTER — THERAPY VISIT (OUTPATIENT)
Dept: PHYSICAL THERAPY | Facility: CLINIC | Age: 82
End: 2017-01-12
Payer: MEDICARE

## 2017-01-12 DIAGNOSIS — M54.50 ACUTE RIGHT-SIDED LOW BACK PAIN WITHOUT SCIATICA: Primary | ICD-10-CM

## 2017-01-12 PROCEDURE — 97112 NEUROMUSCULAR REEDUCATION: CPT | Mod: GP | Performed by: PHYSICAL THERAPIST

## 2017-01-12 PROCEDURE — 97110 THERAPEUTIC EXERCISES: CPT | Mod: GP | Performed by: PHYSICAL THERAPIST

## 2017-01-16 ENCOUNTER — HOSPITAL ENCOUNTER (EMERGENCY)
Facility: CLINIC | Age: 82
Discharge: HOME | End: 2017-01-16
Attending: EMERGENCY MEDICINE | Admitting: EMERGENCY MEDICINE
Payer: MEDICARE

## 2017-01-16 VITALS
OXYGEN SATURATION: 97 % | HEIGHT: 65 IN | WEIGHT: 155 LBS | TEMPERATURE: 98.5 F | BODY MASS INDEX: 25.83 KG/M2 | RESPIRATION RATE: 20 BRPM | DIASTOLIC BLOOD PRESSURE: 64 MMHG | SYSTOLIC BLOOD PRESSURE: 125 MMHG

## 2017-01-16 DIAGNOSIS — R42 DIZZINESS: ICD-10-CM

## 2017-01-16 LAB
ANION GAP SERPL CALCULATED.3IONS-SCNC: 6 MMOL/L (ref 3–14)
BASOPHILS # BLD AUTO: 0 10E9/L (ref 0–0.2)
BASOPHILS NFR BLD AUTO: 0.2 %
BUN SERPL-MCNC: 14 MG/DL (ref 7–30)
CALCIUM SERPL-MCNC: 8.9 MG/DL (ref 8.5–10.1)
CHLORIDE SERPL-SCNC: 101 MMOL/L (ref 94–109)
CO2 SERPL-SCNC: 30 MMOL/L (ref 20–32)
CREAT SERPL-MCNC: 0.72 MG/DL (ref 0.66–1.25)
DIFFERENTIAL METHOD BLD: ABNORMAL
EOSINOPHIL # BLD AUTO: 0.1 10E9/L (ref 0–0.7)
EOSINOPHIL NFR BLD AUTO: 1.2 %
ERYTHROCYTE [DISTWIDTH] IN BLOOD BY AUTOMATED COUNT: 12.9 % (ref 10–15)
GFR SERPL CREATININE-BSD FRML MDRD: ABNORMAL ML/MIN/1.7M2
GLUCOSE SERPL-MCNC: 337 MG/DL (ref 70–99)
HCT VFR BLD AUTO: 39.3 % (ref 40–53)
HGB BLD-MCNC: 13 G/DL (ref 13.3–17.7)
IMM GRANULOCYTES # BLD: 0 10E9/L (ref 0–0.4)
IMM GRANULOCYTES NFR BLD: 0.2 %
INTERPRETATION ECG - MUSE: NORMAL
LYMPHOCYTES # BLD AUTO: 1.5 10E9/L (ref 0.8–5.3)
LYMPHOCYTES NFR BLD AUTO: 28.7 %
MCH RBC QN AUTO: 31.1 PG (ref 26.5–33)
MCHC RBC AUTO-ENTMCNC: 33.1 G/DL (ref 31.5–36.5)
MCV RBC AUTO: 94 FL (ref 78–100)
MONOCYTES # BLD AUTO: 0.4 10E9/L (ref 0–1.3)
MONOCYTES NFR BLD AUTO: 8.3 %
NEUTROPHILS # BLD AUTO: 3.2 10E9/L (ref 1.6–8.3)
NEUTROPHILS NFR BLD AUTO: 61.4 %
NRBC # BLD AUTO: 0 10*3/UL
NRBC BLD AUTO-RTO: 0 /100
PLATELET # BLD AUTO: 175 10E9/L (ref 150–450)
POTASSIUM SERPL-SCNC: 4.9 MMOL/L (ref 3.4–5.3)
RBC # BLD AUTO: 4.18 10E12/L (ref 4.4–5.9)
SODIUM SERPL-SCNC: 137 MMOL/L (ref 133–144)
WBC # BLD AUTO: 5.2 10E9/L (ref 4–11)

## 2017-01-16 PROCEDURE — 80048 BASIC METABOLIC PNL TOTAL CA: CPT | Performed by: EMERGENCY MEDICINE

## 2017-01-16 PROCEDURE — 93005 ELECTROCARDIOGRAM TRACING: CPT

## 2017-01-16 PROCEDURE — 99284 EMERGENCY DEPT VISIT MOD MDM: CPT

## 2017-01-16 PROCEDURE — 85025 COMPLETE CBC W/AUTO DIFF WBC: CPT | Performed by: EMERGENCY MEDICINE

## 2017-01-16 ASSESSMENT — ENCOUNTER SYMPTOMS
HEADACHES: 0
DIZZINESS: 1
LIGHT-HEADEDNESS: 1
SPEECH DIFFICULTY: 0
WEAKNESS: 0
BLOOD IN STOOL: 0
PALPITATIONS: 0
ABDOMINAL PAIN: 1
TROUBLE SWALLOWING: 0

## 2017-01-16 NOTE — ED AVS SNAPSHOT
Emergency Department    [address] [facility] [address]    Phone:  [phone]    Fax:  [phone]                                       Dustin Pearce   MRN: [id number]    Department:   Emergency Department   Date of Visit:  1/16/2017           Patient Information     Date Of Birth          1/31/1928        Your diagnoses for this visit were:     Dizziness        You were seen by Mikel Lomas MD.      Follow-up Information     Follow up with Matt Morrissey MD.    Specialty:  Internal Medicine    Why:  keep appointment;  recheck ed, If symptoms reoccur    Contact information:    [facility]  [address] [place] [address]  [phone]          Discharge Instructions         Dizziness (Uncertain Cause)  Dizziness is a common symptom. It may be described as lightheadedness, spinning, or feeling like you are going to faint. Dizziness can have many causes.  Be sure to tell the healthcare provider about:    All medicines you take, including prescription, over-the-counter, herbs, and supplements    Any other symptoms you have    Any health problems you are being treated for    Anything that causes the dizziness to get worse or better  Today's exam did not show an exact cause for your dizziness. Other tests may be needed. Follow up with your healthcare provider.  Home care    Dizziness that occurs with sudden standing may be a sign of mild dehydration. Drink extra fluids for the next few days.    If you recently started a new medicine, stopped a medicine, or had the dose of a current medicine changed, talk with the prescribing healthcare provider. Your medicine plan may need adjustment.    If dizziness lasts more than a few seconds, sit or lie down until it passes. This may help prevent injury in case you pass out.    Do not drive or use power tools or dangerous equipment until you have had no dizziness for at least 48 hours.  Follow-up care  Follow up with your  healthcare provider for further evaluation within the next 7 days or as advised.  When to seek medical advice  Call your healthcare provider for any of the following:    Worsening of symptoms or new symptoms    Passing out or seizure    Repeated vomiting    Headache    Palpitations (the sense that your heart is fluttering or beating fast or hard)    Shortness of breath    Blood in vomit or stool (black or red color)    Weakness of an arm or leg or one side of the face    Vision or hearing changes    Trouble walking or speaking    Chest, arm, neck, back, or jaw pain       9927-2747 [employer]. [address] [place] [address]. All rights reserved. This information is not intended as a substitute for professional medical care. Always follow your healthcare professional's instructions.          Future Appointments        Provider Department Dept Phone Center    2/2/2017 6:50 PM Lesa Marin PT [facility] [phone] [facility]    2/10/2017 1:30 PM Matt Morrissey MD [facility] [phone]       24 Hour Appointment Hotline       To make an appointment at any [facility], call [phone] ([phone]). If you don't have a family doctor or clinic, we will help you find one. [facility] are conveniently located to serve the needs of you and your family.             Review of your medicines      Our records show that you are taking the medicines listed below. If these are incorrect, please call your family doctor or clinic.        Dose / Directions Last dose taken    ATORVASTATIN CALCIUM PO   Dose:  80 mg        Take 80 mg by mouth daily   Refills:  0        blood glucose monitoring test strip   Commonly known as:  no brand specified   Quantity:  300 each        Use to test blood sugar three times daily or as directed.   Refills:  3        DULoxetine 30 MG EC capsule   Commonly known as:  CYMBALTA   Dose:  30 mg    Quantity:  30 capsule        Take 1 capsule (30 mg) by mouth daily   Refills:  3        glipiZIDE 10 MG 24 hr tablet   Commonly known as:  glipiZIDE XL   Dose:  10 mg   Quantity:  90 tablet        Take 1 tablet (10 mg) by mouth daily   Refills:  3        METAMUCIL PO   Dose:  1 packet        Take 1 packet by mouth daily   Refills:  0        METFORMIN HCL PO   Dose:  1000 mg        Take 1,000 mg by mouth 2 times daily (with meals)   Refills:  0        metoprolol 50 MG tablet   Commonly known as:  LOPRESSOR   Dose:  50 mg   Quantity:  60 tablet        Take 1 tablet (50 mg) by mouth 2 times daily   Refills:  1        omeprazole 40 MG capsule   Commonly known as:  priLOSEC   Dose:  40 mg   Quantity:  90 capsule        Take 1 capsule (40 mg) by mouth daily Take 30-60 minutes before a meal.   Refills:  0        order for DME   Quantity:  1 Device        Equipment being ordered: [employer] Blood Glucose meter.   Refills:  0        PLAVIX PO   Dose:  75 mg        Take 75 mg by mouth daily   Refills:  0        TAMSULOSIN HCL PO   Dose:  0.4 mg        Take 0.4 mg by mouth daily   Refills:  0        VITAMIN D (CHOLECALCIFEROL) PO   Dose:  1000 Units        Take 1,000 Units by mouth daily   Refills:  0                Procedures and tests performed during your visit     Basic metabolic panel    CBC with platelets differential    EKG 12-lead, tracing only      Orders Needing Specimen Collection     None      Pending Results     No orders found from 1/15/2017 to 1/17/2017.            Pending Culture Results     No orders found from 1/15/2017 to 1/17/2017.       Test Results from your hospital stay           1/16/2017 10:50 AM - Interface, [employer] Results      Component Results     Component Value Ref Range & Units Status    WBC 5.2 4.0 - 11.0 10e9/L Final    RBC Count 4.18 (L) 4.4 - 5.9 10e12/L Final    Hemoglobin 13.0 (L) 13.3 - 17.7 g/dL Final    Hematocrit 39.3 (L) 40.0 - 53.0 % Final    MCV 94 78 - 100 fl Final    MCH 31.1  26.5 - 33.0 pg Final    MCHC 33.1 31.5 - 36.5 g/dL Final    RDW 12.9 10.0 - 15.0 % Final    Platelet Count 175 150 - 450 10e9/L Final    Diff Method Automated Method  Final    % Neutrophils 61.4 % Final    % Lymphocytes 28.7 % Final    % Monocytes 8.3 % Final    % Eosinophils 1.2 % Final    % Basophils 0.2 % Final    % Immature Granulocytes 0.2 % Final    Nucleated RBCs 0 0 /100 Final    Absolute Neutrophil 3.2 1.6 - 8.3 10e9/L Final    Absolute Lymphocytes 1.5 0.8 - 5.3 10e9/L Final    Absolute Monocytes 0.4 0.0 - 1.3 10e9/L Final    Absolute Eosinophils 0.1 0.0 - 0.7 10e9/L Final    Absolute Basophils 0.0 0.0 - 0.2 10e9/L Final    Abs Immature Granulocytes 0.0 0 - 0.4 10e9/L Final    Absolute Nucleated RBC 0.0  Final         1/16/2017 11:06 AM - Interface, [employer] Results      Component Results     Component Value Ref Range & Units Status    Sodium 137 133 - 144 mmol/L Final    Potassium 4.9 3.4 - 5.3 mmol/L Final    Chloride 101 94 - 109 mmol/L Final    Carbon Dioxide 30 20 - 32 mmol/L Final    Anion Gap 6 3 - 14 mmol/L Final    Glucose 337 (H) 70 - 99 mg/dL Final    Urea Nitrogen 14 7 - 30 mg/dL Final    Creatinine 0.72 0.66 - 1.25 mg/dL Final    GFR Estimate >90  Non  GFR Calc   >60 mL/min/1.7m2 Final    GFR Estimate If Black >90   GFR Calc   >60 mL/min/1.7m2 Final    Calcium 8.9 8.5 - 10.1 mg/dL Final                Clinical Quality Measure: Blood Pressure Screening     Your blood pressure was checked while you were in the emergency department today. The last reading we obtained was  BP: 123/48 mmHg . Please read the guidelines below about what these numbers mean and what you should do about them.  If your systolic blood pressure (the top number) is less than 120 and your diastolic blood pressure (the bottom number) is less than 80, then your blood pressure is normal. There is nothing more that you need to do about it.  If your systolic blood pressure (the top number) is  120-139 or your diastolic blood pressure (the bottom number) is 80-89, your blood pressure may be higher than it should be. You should have your blood pressure rechecked within a year by a primary care provider.  If your systolic blood pressure (the top number) is 140 or greater or your diastolic blood pressure (the bottom number) is 90 or greater, you may have high blood pressure. High blood pressure is treatable, but if left untreated over time it can put you at risk for heart attack, stroke, or kidney failure. You should have your blood pressure rechecked by a primary care provider within the next 4 weeks.  If your provider in the emergency department today gave you specific instructions to follow-up with your doctor or provider even sooner than that, you should follow that instruction and not wait for up to 4 weeks for your follow-up visit.        Thank you for choosing [facility]       Thank you for choosing [facility] for your care. Our goal is always to provide you with excellent care. Hearing back from our patients is one way we can continue to improve our services. Please take a few minutes to complete the written survey that you may receive in the mail after you visit with us. Thank you!        Care EveryWhere ID     This is your Care EveryWhere ID. This could be used by other organizations to access your [facility] medical records  [id number]        After Visit Summary       This is your record. Keep this with you and show to your community pharmacist(s) and doctor(s) at your next visit.

## 2017-01-16 NOTE — ED AVS SNAPSHOT
Emergency Department    [address] [facility] [address]    Phone:  [phone]    Fax:  [phone]                                       Dustin Pearce   MRN: [id number]    Department:   Emergency Department   Date of Visit:  1/16/2017           After Visit Summary Signature Page     I have received my discharge instructions, and my questions have been answered. I have discussed any challenges I see with this plan with the nurse or doctor.    ..........................................................................................................................................  Patient/Patient Representative Signature      ..........................................................................................................................................  Patient Representative Print Name and Relationship to Patient    ..................................................               ................................................  Date                                            Time    ..........................................................................................................................................  Reviewed by Signature/Title    ...................................................              ..............................................  Date                                                            Time

## 2017-01-16 NOTE — ED PROVIDER NOTES
"  History     Chief Complaint:  Dizziness    HPI   Dustin Pearce is a 88 year old male with a history of hypertension, hyperlipidemia, diabetes, CAD, on Plavix, who presents to the emergency department today for evaluation of dizziness. The patient had an injection in his eye for his macular degeneration this morning. The shot is supposed to \"keep the bleeding down in the back of his eye\" and he has had these shots before without difficulty. About 10 minutes after his injection today the patient became dizzy and lightheaded, he was unsure whether the room was spinning but felt as though he might pass out. He had to lean against the wall to keep from falling over and then sat in a chair at the clinic for about an hour before being transferred to his car by wheelchair. He had no one sided weakness, headache, trouble swallowing or difficulty speaking. The vision is his non-bandaged left eye is unchanged. He had no chest pain or pressure and no palpitations. His friend states that yesterday his blood sugar \"was all over the place\" but today it was 189. He did not eat prior to his injection but was given two small cookies and some orange juice at the clinic. He felt as though he was still lightheaded and \"out of sorts\" which prompted his visit to the ED. Here in the ED he feels close to normal and his symptoms have improved. He has had a couple of these spells before, even without this injection, but none have been as bad as the one today. The last time he had these symptoms was 3 years ago after he had a TIA. The patient does not take a daily aspirin. He has some abdominal discomfort but no black or bloody stools. Of note, the patient has had a cholecystectomy.    Allergies:  Oxycodone  Sulfa Drugs  Tetracycline      Medications:    Cymbalta   Metoprolol   Omeprazole  Glipizide   Tamsulosin   Metformin   Atorvastatin   Metamucil   Vitamin D   Plavix    Past Medical History:    Hypertension   Hyperlipidemia " "  DJD  Osteopenia   Nephrolithiasis   Carotid occlusion, left   Diabetes mellitus   PONV  Heart attack   Unspecified cerebral artery occlusion with cerebral infarction   GERD  CAD involving native coronary artery of native heart without angina pectoris  Mild cognitive impairment   Benign non-nodular prostatic hyperplasia with lower UTI symptoms  Carotid artery stenosis     Past Surgical History:    Cholecystectomy   Hernia repair    surgery, kidney stone removal x4  Laser holmium lithotripsy ureter(s), insert stent, combined (2012)    Rotator cuff repair   Egd (2016)   Hc ugi endoscopy w eus (2016)     Family History:    Father - Heart Failure   Mother - Breast Cancer     Social History:  The patient was accompanied to the ED by his girlfriend.  Smoking Status: Former Smoker, Quit in 1999  Smokeless Tobacco: Negative  Alcohol Use: Positive  Marital Status:   [4]     Review of Systems   HENT: Negative for trouble swallowing.    Eyes: Negative for visual disturbance.   Cardiovascular: Negative for chest pain and palpitations.   Gastrointestinal: Positive for abdominal pain. Negative for blood in stool.   Neurological: Positive for dizziness and light-headedness. Negative for speech difficulty, weakness and headaches.   All other systems reviewed and are negative.    Physical Exam   Vitals:   Patient Vitals for the past 24 hrs:   BP Temp Temp src Heart Rate Resp SpO2 Height Weight   01/16/17 0959 123/48 mmHg 98.5  F (36.9  C) Oral 69 20 97 % 1.651 m (5' 5\") 70.308 kg (155 lb)        Physical Exam  Constitutional: White male supine  HENT: No signs of trauma.   Eyes: EOM are normal. Right eye bandaged shut. Left pupil 2 mm reactive.   Neck: Normal range of motion. No JVD present. No cervical adenopathy. Bruit noted right carotid.   Cardiovascular: Regular rhythm.  Exam reveals no gallop and no friction rub.    No murmur heard.  Pulmonary/Chest: Bilateral breath sounds normal. No wheezes, rhonchi or rales. 2+ " femoral pulses.  Abdominal: Soft. No rebound or guarding. Right upper quadrant incision. Mild epigastric tenderness.   Musculoskeletal: No edema. No tenderness.   Lymphadenopathy: No lymphadenopathy.   Neurological: Alert and oriented to person, place, and time. Normal strength. Coordination normal. Fluent speech. No facial asymmetry. Normal sensation. Finger, nose, finger intact.  Skin: Skin is warm and dry. No rash noted. No erythema.     Emergency Department Course     ECG:  ECG taken at 1037, ECG read at 1041  Sinus rhythm with premature atrial complexes  LAHB  Right bundle branch block   Nonspecific ST abnormality   Prolonged QT  Rate 67 bpm. MD interval 164. QRS duration 118. QT/QTc 456/481. P-R-T axes 92 -73 -11.    Laboratory:  Laboratory findings were communicated with the patient who voiced understanding of the findings.    CBC: WBC 5.2, HGB 13.0,   BMP: Glucose 337, Creatinine 0.72     Emergency Department Course:  Nursing notes and vitals reviewed.  I performed an exam of the patient as documented above.   IV was inserted and blood was drawn for laboratory testing, results above.  At 1130 the patient was rechecked and was updated on the results of his laboratory studies.   I discussed the treatment plan with the patient and his girlfriend. They expressed understanding of this plan and consented to discharge. They will be discharged home with instructions for care and follow up. In addition, the patient will return to the emergency department if their symptoms persist, worsen, if new symptoms arise or if there is any concern.  All questions were answered.  I personally reviewed the laboratory results with the patient and answered all related questions prior to discharge.    Impression & Plan      Medical Decision Making:  Dustin Pearce is a 88 year old male who was at the ophthalmologist today, he had an injection in his right eye to control bleeding. Within a few minutes he began to feel very  dizzy and lightheaded. He cannot distinguish whether it was a spinning sensation or not and this lasted for maybe an hour. He was helped to his car but he felt he wanted to be checked and came to the emergency department. By the time he arrived all of his symptoms have resolved, he was back to normal. He denied feeling any headache or chest pain, shortness of breath or belly pain. He has had no bloody stools, no speech or swallowing difficulty. He has very poor vision that is not patched as well as the side that is patch so he could not distinguish diplopia. His girlfriend states he has had some episodes of dizziness before but not this bad. He has also had a previous stroke and TIA's. He has known carotid disease but he is on Plavix. Patient was examined here, there were no focal findings. He was watched on a monitor for several hours without complication. He is diabetic. He was given some food and his sugar was checked. Now his sugar is running a little high. Patient is comfortable, he is stable and he feels good going home. He has an appointment with his primary doctor listed in the next 1-2 weeks and he will keep that. He should return to the ED if symptoms reoccur. This seems unlikely to be acute coronary disease, arhythmia, stroke or sepsis. This may have a vagal component or may have been a TIA or inner ear problem.     Diagnosis:    ICD-10-CM    1. Dizziness of unknown etiology R42      Disposition:   Discharge to home    Scribe Disclosure:  OLIVER Radha Eduardoazael, am serving as a scribe at 10:00 AM on 1/16/2017 to document services personally performed by Mikel Lomas MD, based on my observations and the provider's statements to me.    1/16/2017    EMERGENCY DEPARTMENT      Mikel Lomas MD  01/16/17 0516

## 2017-01-16 NOTE — DISCHARGE INSTRUCTIONS
Dizziness (Uncertain Cause)  Dizziness is a common symptom. It may be described as lightheadedness, spinning, or feeling like you are going to faint. Dizziness can have many causes.  Be sure to tell the healthcare provider about:    All medicines you take, including prescription, over-the-counter, herbs, and supplements    Any other symptoms you have    Any health problems you are being treated for    Anything that causes the dizziness to get worse or better  Today's exam did not show an exact cause for your dizziness. Other tests may be needed. Follow up with your healthcare provider.  Home care    Dizziness that occurs with sudden standing may be a sign of mild dehydration. Drink extra fluids for the next few days.    If you recently started a new medicine, stopped a medicine, or had the dose of a current medicine changed, talk with the prescribing healthcare provider. Your medicine plan may need adjustment.    If dizziness lasts more than a few seconds, sit or lie down until it passes. This may help prevent injury in case you pass out.    Do not drive or use power tools or dangerous equipment until you have had no dizziness for at least 48 hours.  Follow-up care  Follow up with your healthcare provider for further evaluation within the next 7 days or as advised.  When to seek medical advice  Call your healthcare provider for any of the following:    Worsening of symptoms or new symptoms    Passing out or seizure    Repeated vomiting    Headache    Palpitations (the sense that your heart is fluttering or beating fast or hard)    Shortness of breath    Blood in vomit or stool (black or red color)    Weakness of an arm or leg or one side of the face    Vision or hearing changes    Trouble walking or speaking    Chest, arm, neck, back, or jaw pain       3281-0619 [employer] [address] [place] [address], [place] [address]. All rights reserved. This information is not intended as a substitute for  professional medical care. Always follow your healthcare professional's instructions.

## 2017-01-20 ENCOUNTER — APPOINTMENT (OUTPATIENT)
Dept: CT IMAGING | Facility: CLINIC | Age: 82
End: 2017-01-20
Attending: EMERGENCY MEDICINE
Payer: MEDICARE

## 2017-01-20 ENCOUNTER — HOSPITAL ENCOUNTER (OUTPATIENT)
Facility: CLINIC | Age: 82
Setting detail: OBSERVATION
Discharge: HOME | End: 2017-01-21
Attending: EMERGENCY MEDICINE | Admitting: HOSPITALIST
Payer: MEDICARE

## 2017-01-20 ENCOUNTER — APPOINTMENT (OUTPATIENT)
Dept: SPEECH THERAPY | Facility: CLINIC | Age: 82
End: 2017-01-20
Attending: HOSPITALIST
Payer: MEDICARE

## 2017-01-20 ENCOUNTER — APPOINTMENT (OUTPATIENT)
Dept: MRI IMAGING | Facility: CLINIC | Age: 82
End: 2017-01-20
Attending: HOSPITALIST
Payer: MEDICARE

## 2017-01-20 DIAGNOSIS — I63.032 CEREBROVASCULAR ACCIDENT (CVA) DUE TO THROMBOSIS OF LEFT CAROTID ARTERY (H): ICD-10-CM

## 2017-01-20 DIAGNOSIS — I63.9 CEREBROVASCULAR ACCIDENT (CVA), UNSPECIFIED MECHANISM (H): ICD-10-CM

## 2017-01-20 DIAGNOSIS — I25.10 CORONARY ARTERY DISEASE INVOLVING NATIVE CORONARY ARTERY OF NATIVE HEART WITHOUT ANGINA PECTORIS: ICD-10-CM

## 2017-01-20 DIAGNOSIS — I65.22 LEFT CAROTID ARTERY OCCLUSION: Primary | ICD-10-CM

## 2017-01-20 DIAGNOSIS — G45.9 TRANSIENT CEREBRAL ISCHEMIA, UNSPECIFIED TYPE: ICD-10-CM

## 2017-01-20 DIAGNOSIS — G93.89 ENCEPHALOMALACIA: ICD-10-CM

## 2017-01-20 LAB
ALBUMIN UR-MCNC: NEGATIVE MG/DL
ANION GAP SERPL CALCULATED.3IONS-SCNC: 6 MMOL/L (ref 3–14)
APPEARANCE UR: CLEAR
APTT PPP: 29 SEC (ref 22–37)
BASOPHILS # BLD AUTO: 0 10E9/L (ref 0–0.2)
BASOPHILS NFR BLD AUTO: 0.4 %
BILIRUB UR QL STRIP: NEGATIVE
BUN SERPL-MCNC: 16 MG/DL (ref 7–30)
CALCIUM SERPL-MCNC: 8.6 MG/DL (ref 8.5–10.1)
CHLORIDE SERPL-SCNC: 104 MMOL/L (ref 94–109)
CO2 SERPL-SCNC: 30 MMOL/L (ref 20–32)
COLOR UR AUTO: ABNORMAL
CREAT SERPL-MCNC: 0.73 MG/DL (ref 0.66–1.25)
CRP SERPL-MCNC: <2.9 MG/L (ref 0–8)
DIFFERENTIAL METHOD BLD: ABNORMAL
EOSINOPHIL # BLD AUTO: 0.1 10E9/L (ref 0–0.7)
EOSINOPHIL NFR BLD AUTO: 2.2 %
ERYTHROCYTE [DISTWIDTH] IN BLOOD BY AUTOMATED COUNT: 13 % (ref 10–15)
GFR SERPL CREATININE-BSD FRML MDRD: ABNORMAL ML/MIN/1.7M2
GLUCOSE BLDC GLUCOMTR-MCNC: 161 MG/DL (ref 70–99)
GLUCOSE BLDC GLUCOMTR-MCNC: 255 MG/DL (ref 70–99)
GLUCOSE SERPL-MCNC: 193 MG/DL (ref 70–99)
GLUCOSE UR STRIP-MCNC: NEGATIVE MG/DL
HCT VFR BLD AUTO: 40.8 % (ref 40–53)
HGB BLD-MCNC: 13.4 G/DL (ref 13.3–17.7)
HGB UR QL STRIP: NEGATIVE
IMM GRANULOCYTES # BLD: 0 10E9/L (ref 0–0.4)
IMM GRANULOCYTES NFR BLD: 0.2 %
INR PPP: 1.03 (ref 0.86–1.14)
INTERPRETATION ECG - MUSE: NORMAL
KETONES UR STRIP-MCNC: NEGATIVE MG/DL
LEUKOCYTE ESTERASE UR QL STRIP: NEGATIVE
LYMPHOCYTES # BLD AUTO: 2.1 10E9/L (ref 0.8–5.3)
LYMPHOCYTES NFR BLD AUTO: 38.3 %
MCH RBC QN AUTO: 30.9 PG (ref 26.5–33)
MCHC RBC AUTO-ENTMCNC: 32.8 G/DL (ref 31.5–36.5)
MCV RBC AUTO: 94 FL (ref 78–100)
MONOCYTES # BLD AUTO: 0.6 10E9/L (ref 0–1.3)
MONOCYTES NFR BLD AUTO: 10.5 %
MUCOUS THREADS #/AREA URNS LPF: PRESENT /LPF
NEUTROPHILS # BLD AUTO: 2.6 10E9/L (ref 1.6–8.3)
NEUTROPHILS NFR BLD AUTO: 48.4 %
NITRATE UR QL: NEGATIVE
NRBC # BLD AUTO: 0 10*3/UL
NRBC BLD AUTO-RTO: 0 /100
PH UR STRIP: 6.5 PH (ref 5–7)
PLATELET # BLD AUTO: 139 10E9/L (ref 150–450)
POTASSIUM SERPL-SCNC: 4.2 MMOL/L (ref 3.4–5.3)
RBC # BLD AUTO: 4.34 10E12/L (ref 4.4–5.9)
RBC #/AREA URNS AUTO: 0 /HPF (ref 0–2)
SODIUM SERPL-SCNC: 140 MMOL/L (ref 133–144)
SP GR UR STRIP: 1.04 (ref 1–1.03)
SQUAMOUS #/AREA URNS AUTO: <1 /HPF (ref 0–1)
TROPONIN I SERPL-MCNC: 0.02 UG/L (ref 0–0.04)
TROPONIN I SERPL-MCNC: 0.03 UG/L (ref 0–0.04)
TSH SERPL DL<=0.005 MIU/L-ACNC: 3.12 MU/L (ref 0.4–4)
URN SPEC COLLECT METH UR: ABNORMAL
UROBILINOGEN UR STRIP-MCNC: NORMAL MG/DL (ref 0–2)
VIT B12 SERPL-MCNC: 298 PG/ML (ref 193–986)
WBC # BLD AUTO: 5.4 10E9/L (ref 4–11)
WBC #/AREA URNS AUTO: 1 /HPF (ref 0–2)

## 2017-01-20 PROCEDURE — 99220 ZZC INITIAL OBSERVATION CARE,LEVL III: CPT | Performed by: HOSPITALIST

## 2017-01-20 PROCEDURE — 25000128 H RX IP 250 OP 636: Performed by: EMERGENCY MEDICINE

## 2017-01-20 PROCEDURE — 96361 HYDRATE IV INFUSION ADD-ON: CPT

## 2017-01-20 PROCEDURE — 96372 THER/PROPH/DIAG INJ SC/IM: CPT

## 2017-01-20 PROCEDURE — 99285 EMERGENCY DEPT VISIT HI MDM: CPT | Mod: 25

## 2017-01-20 PROCEDURE — 84443 ASSAY THYROID STIM HORMONE: CPT | Performed by: EMERGENCY MEDICINE

## 2017-01-20 PROCEDURE — 85610 PROTHROMBIN TIME: CPT | Performed by: EMERGENCY MEDICINE

## 2017-01-20 PROCEDURE — 82607 VITAMIN B-12: CPT | Performed by: EMERGENCY MEDICINE

## 2017-01-20 PROCEDURE — 40000225 ZZH STATISTIC SLP WARD VISIT: Performed by: SPEECH-LANGUAGE PATHOLOGIST

## 2017-01-20 PROCEDURE — A9585 GADOBUTROL INJECTION: HCPCS | Performed by: HOSPITALIST

## 2017-01-20 PROCEDURE — 25000128 H RX IP 250 OP 636: Performed by: HOSPITALIST

## 2017-01-20 PROCEDURE — 92526 ORAL FUNCTION THERAPY: CPT | Mod: GN | Performed by: SPEECH-LANGUAGE PATHOLOGIST

## 2017-01-20 PROCEDURE — 70498 CT ANGIOGRAPHY NECK: CPT

## 2017-01-20 PROCEDURE — A9270 NON-COVERED ITEM OR SERVICE: HCPCS | Mod: GY | Performed by: HOSPITALIST

## 2017-01-20 PROCEDURE — 93005 ELECTROCARDIOGRAM TRACING: CPT

## 2017-01-20 PROCEDURE — 96360 HYDRATION IV INFUSION INIT: CPT | Mod: 59

## 2017-01-20 PROCEDURE — 81001 URINALYSIS AUTO W/SCOPE: CPT | Performed by: EMERGENCY MEDICINE

## 2017-01-20 PROCEDURE — 85025 COMPLETE CBC W/AUTO DIFF WBC: CPT | Performed by: EMERGENCY MEDICINE

## 2017-01-20 PROCEDURE — 25000125 ZZHC RX 250: Performed by: EMERGENCY MEDICINE

## 2017-01-20 PROCEDURE — 25500064 ZZH RX 255 OP 636: Performed by: EMERGENCY MEDICINE

## 2017-01-20 PROCEDURE — G0378 HOSPITAL OBSERVATION PER HR: HCPCS

## 2017-01-20 PROCEDURE — 84484 ASSAY OF TROPONIN QUANT: CPT | Performed by: HOSPITALIST

## 2017-01-20 PROCEDURE — 82306 VITAMIN D 25 HYDROXY: CPT | Performed by: EMERGENCY MEDICINE

## 2017-01-20 PROCEDURE — 86140 C-REACTIVE PROTEIN: CPT | Performed by: EMERGENCY MEDICINE

## 2017-01-20 PROCEDURE — 25000132 ZZH RX MED GY IP 250 OP 250 PS 637: Mod: GY | Performed by: HOSPITALIST

## 2017-01-20 PROCEDURE — 36415 COLL VENOUS BLD VENIPUNCTURE: CPT | Performed by: HOSPITALIST

## 2017-01-20 PROCEDURE — 85730 THROMBOPLASTIN TIME PARTIAL: CPT | Performed by: EMERGENCY MEDICINE

## 2017-01-20 PROCEDURE — 92610 EVALUATE SWALLOWING FUNCTION: CPT | Mod: GN | Performed by: SPEECH-LANGUAGE PATHOLOGIST

## 2017-01-20 PROCEDURE — 25500064 ZZH RX 255 OP 636: Performed by: HOSPITALIST

## 2017-01-20 PROCEDURE — 80048 BASIC METABOLIC PNL TOTAL CA: CPT | Performed by: EMERGENCY MEDICINE

## 2017-01-20 PROCEDURE — 00000146 ZZHCL STATISTIC GLUCOSE BY METER IP

## 2017-01-20 PROCEDURE — 70460 CT HEAD/BRAIN W/DYE: CPT

## 2017-01-20 PROCEDURE — 84484 ASSAY OF TROPONIN QUANT: CPT | Mod: 91 | Performed by: EMERGENCY MEDICINE

## 2017-01-20 PROCEDURE — 70470 CT HEAD/BRAIN W/O & W/DYE: CPT | Mod: XS

## 2017-01-20 PROCEDURE — 70553 MRI BRAIN STEM W/O & W/DYE: CPT

## 2017-01-20 RX ORDER — ONDANSETRON 2 MG/ML
4 INJECTION INTRAMUSCULAR; INTRAVENOUS EVERY 6 HOURS PRN
Status: DISCONTINUED | OUTPATIENT
Start: 2017-01-20 | End: 2017-01-21 | Stop reason: HOSPADM

## 2017-01-20 RX ORDER — PROCHLORPERAZINE 25 MG
12.5 SUPPOSITORY, RECTAL RECTAL EVERY 12 HOURS PRN
Status: DISCONTINUED | OUTPATIENT
Start: 2017-01-20 | End: 2017-01-21 | Stop reason: HOSPADM

## 2017-01-20 RX ORDER — NICOTINE POLACRILEX 4 MG
15-30 LOZENGE BUCCAL
Status: DISCONTINUED | OUTPATIENT
Start: 2017-01-20 | End: 2017-01-21 | Stop reason: HOSPADM

## 2017-01-20 RX ORDER — ONDANSETRON 4 MG/1
4 TABLET, ORALLY DISINTEGRATING ORAL EVERY 6 HOURS PRN
Status: DISCONTINUED | OUTPATIENT
Start: 2017-01-20 | End: 2017-01-21 | Stop reason: HOSPADM

## 2017-01-20 RX ORDER — DEXTROSE MONOHYDRATE 25 G/50ML
25-50 INJECTION, SOLUTION INTRAVENOUS
Status: DISCONTINUED | OUTPATIENT
Start: 2017-01-20 | End: 2017-01-21 | Stop reason: HOSPADM

## 2017-01-20 RX ORDER — HYDROCODONE BITARTRATE AND ACETAMINOPHEN 5; 325 MG/1; MG/1
1-2 TABLET ORAL EVERY 4 HOURS PRN
Status: DISCONTINUED | OUTPATIENT
Start: 2017-01-20 | End: 2017-01-21 | Stop reason: HOSPADM

## 2017-01-20 RX ORDER — ACETAMINOPHEN 325 MG/1
650 TABLET ORAL EVERY 4 HOURS PRN
Status: DISCONTINUED | OUTPATIENT
Start: 2017-01-20 | End: 2017-01-21 | Stop reason: HOSPADM

## 2017-01-20 RX ORDER — LIDOCAINE 40 MG/G
CREAM TOPICAL
Status: DISCONTINUED | OUTPATIENT
Start: 2017-01-20 | End: 2017-01-21 | Stop reason: HOSPADM

## 2017-01-20 RX ORDER — TAMSULOSIN HYDROCHLORIDE 0.4 MG/1
0.4 CAPSULE ORAL DAILY
Status: DISCONTINUED | OUTPATIENT
Start: 2017-01-20 | End: 2017-01-21 | Stop reason: HOSPADM

## 2017-01-20 RX ORDER — PROCHLORPERAZINE MALEATE 5 MG
5 TABLET ORAL EVERY 6 HOURS PRN
Status: DISCONTINUED | OUTPATIENT
Start: 2017-01-20 | End: 2017-01-21 | Stop reason: HOSPADM

## 2017-01-20 RX ORDER — ACETAMINOPHEN 650 MG/1
650 SUPPOSITORY RECTAL EVERY 4 HOURS PRN
Status: DISCONTINUED | OUTPATIENT
Start: 2017-01-20 | End: 2017-01-21 | Stop reason: HOSPADM

## 2017-01-20 RX ORDER — NALOXONE HYDROCHLORIDE 0.4 MG/ML
.1-.4 INJECTION, SOLUTION INTRAMUSCULAR; INTRAVENOUS; SUBCUTANEOUS
Status: DISCONTINUED | OUTPATIENT
Start: 2017-01-20 | End: 2017-01-21 | Stop reason: HOSPADM

## 2017-01-20 RX ORDER — SODIUM CHLORIDE 9 MG/ML
INJECTION, SOLUTION INTRAVENOUS CONTINUOUS
Status: DISCONTINUED | OUTPATIENT
Start: 2017-01-20 | End: 2017-01-21 | Stop reason: HOSPADM

## 2017-01-20 RX ORDER — ATORVASTATIN CALCIUM 40 MG/1
80 TABLET, FILM COATED ORAL AT BEDTIME
Status: DISCONTINUED | OUTPATIENT
Start: 2017-01-20 | End: 2017-01-21 | Stop reason: HOSPADM

## 2017-01-20 RX ORDER — GADOBUTROL 604.72 MG/ML
7 INJECTION INTRAVENOUS ONCE
Status: COMPLETED | OUTPATIENT
Start: 2017-01-20 | End: 2017-01-20

## 2017-01-20 RX ORDER — LABETALOL HYDROCHLORIDE 5 MG/ML
10-40 INJECTION, SOLUTION INTRAVENOUS EVERY 10 MIN PRN
Status: DISCONTINUED | OUTPATIENT
Start: 2017-01-20 | End: 2017-01-21 | Stop reason: HOSPADM

## 2017-01-20 RX ORDER — CLOPIDOGREL BISULFATE 75 MG/1
75 TABLET ORAL DAILY
Status: DISCONTINUED | OUTPATIENT
Start: 2017-01-20 | End: 2017-01-21 | Stop reason: HOSPADM

## 2017-01-20 RX ORDER — AMOXICILLIN 250 MG
1-2 CAPSULE ORAL 2 TIMES DAILY PRN
Status: DISCONTINUED | OUTPATIENT
Start: 2017-01-20 | End: 2017-01-21 | Stop reason: HOSPADM

## 2017-01-20 RX ORDER — BISACODYL 10 MG
10 SUPPOSITORY, RECTAL RECTAL DAILY PRN
Status: DISCONTINUED | OUTPATIENT
Start: 2017-01-20 | End: 2017-01-21 | Stop reason: HOSPADM

## 2017-01-20 RX ORDER — ASPIRIN 81 MG/1
81 TABLET ORAL DAILY
Status: DISCONTINUED | OUTPATIENT
Start: 2017-01-20 | End: 2017-01-21 | Stop reason: HOSPADM

## 2017-01-20 RX ORDER — IOPAMIDOL 755 MG/ML
120 INJECTION, SOLUTION INTRAVASCULAR ONCE
Status: COMPLETED | OUTPATIENT
Start: 2017-01-20 | End: 2017-01-20

## 2017-01-20 RX ORDER — HYDROCODONE BITARTRATE AND ACETAMINOPHEN 5; 325 MG/1; MG/1
1-2 TABLET ORAL EVERY 4 HOURS PRN
Status: ON HOLD | COMMUNITY
End: 2017-06-09

## 2017-01-20 RX ADMIN — ASPIRIN 81 MG: 81 TABLET, COATED ORAL at 16:38

## 2017-01-20 RX ADMIN — SODIUM CHLORIDE 100 ML: 9 INJECTION, SOLUTION INTRAVENOUS at 10:48

## 2017-01-20 RX ADMIN — ATORVASTATIN CALCIUM 80 MG: 40 TABLET, FILM COATED ORAL at 21:36

## 2017-01-20 RX ADMIN — GADOBUTROL 7 ML: 604.72 INJECTION INTRAVENOUS at 17:57

## 2017-01-20 RX ADMIN — CLOPIDOGREL BISULFATE 75 MG: 75 TABLET, FILM COATED ORAL at 16:37

## 2017-01-20 RX ADMIN — SODIUM CHLORIDE: 9 INJECTION, SOLUTION INTRAVENOUS at 14:03

## 2017-01-20 RX ADMIN — ACETAMINOPHEN 650 MG: 325 TABLET, FILM COATED ORAL at 16:37

## 2017-01-20 RX ADMIN — IOPAMIDOL 120 ML: 755 INJECTION, SOLUTION INTRAVENOUS at 10:48

## 2017-01-20 RX ADMIN — SODIUM CHLORIDE 1000 ML: 9 INJECTION, SOLUTION INTRAVENOUS at 11:02

## 2017-01-20 RX ADMIN — INSULIN ASPART 1 UNITS: 100 INJECTION, SOLUTION INTRAVENOUS; SUBCUTANEOUS at 19:17

## 2017-01-20 RX ADMIN — TAMSULOSIN HYDROCHLORIDE 0.4 MG: 0.4 CAPSULE ORAL at 16:38

## 2017-01-20 ASSESSMENT — ENCOUNTER SYMPTOMS
NUMBNESS: 1
SHORTNESS OF BREATH: 0
HEADACHES: 1
NECK PAIN: 0
CONFUSION: 1
WEAKNESS: 1

## 2017-01-20 ASSESSMENT — VISUAL ACUITY
OU: NORMAL ACUITY

## 2017-01-20 NOTE — ED NOTES
"[facility]  ED Nurse Handoff Report    ED Chief complaint: One-sided Weakness      ED Diagnosis:   Final diagnoses:   Cerebrovascular accident (CVA), unspecified mechanism (H)       Code Status: To be discussed with admitting provider     Allergies:   Allergies   Allergen Reactions     Oxycodone Other (See Comments)     \"TERRIBLE SWEATING\"     Sulfa Drugs      Tetracycline        Activity level:  Stand with Assist     Needed?: No    Isolation: No  Infection: Not Applicable    Bariatric?: No      Vital Signs:   Filed Vitals:    01/20/17 1033 01/20/17 1045 01/20/17 1100   BP: 162/57 148/80 146/58   Pulse: 58 58    Temp: 98.5  F (36.9  C)     TempSrc: Oral     Resp: 14 14 15   Height: 1.651 m (5' 5\")     Weight: 69 kg (152 lb 1.9 oz)     SpO2: 100% 95% 94%       Cardiac Rhythm: ,    Sr with PACs    Pain level:      Is this patient confused?: No    Patient Report: Initial Complaint: Patient came in via EMS after wife noted him to be weak walking on the right side and he was having trouble speaking around 0915.   Neuro: WDL at this time. FERNANDEZ, Perrl, Denies numbness/tingling in extremities. Speech normal.   Cards: WDL, SR with PACS, normotensive  Pulm: WDL   GI: NPO in ED  : Assume WDL   Skin: WDL, anterior assessment only    Tests Performed: CT, Labs,   Abnormal Results: See Results. CT did not show acute changes, only chronic per Dr. Padilla  Treatments provided: Fluids    Family Comments: Wife at bedside    OBS brochure/video discussed/provided to patient: No    ED Medications:   Medications   iopamidol (ISOVUE-370) solution 120 mL (120 mLs Intravenous Given 1/20/17 1048)   sodium chloride 0.9 % for CT scan flush dose 100 mL (100 mLs Intravenous Given 1/20/17 1048)   0.9% sodium chloride BOLUS (1,000 mLs Intravenous New Bag 1/20/17 1102)       Drips infusing?:  No      ED NURSE PHONE NUMBER:            "

## 2017-01-20 NOTE — H&P
[facility]    History and Physical  Hospitalist       Date of Admission:  1/20/2017  Date of Service (when I saw the patient): 01/20/2017    Assessment and Plan  Dustin Pearce is a 88 year old male with history of CVA, CAD with distant MI, HTN, hyperlipidemia, DM II, chronic left carotic occlusion and right carotid stenosis among other chronic medical issues who presents with dysarthria, right facial droop and ataxia.    TIA vs CVA  Hx of CVA in 1999 without residual deficits.  Presents with above symptoms, CT head noting old left frontal stroke, no acute pathology.  CTA with chronic occlusion of left internal carotid, moderate stenosis (50%) of right ICA, new severe stenosis of left posterior cerebral artery P2 segment.  Symptoms largely resolved at time of visit, though wife reporting some ongoing mild confusion.  Neurology assessed patient in ED, did not feel a candidate for TPA.  - MRI brain  - TTE with bubble, tele  - TSH, Vit D, B12, serial trop (recent A1C 7.9%, will not repeat)  - neuro checks  - PT/OT/SLP evals  - start ASA 81 mg daily, continue PTA Plavix and statin  - Neurology consult    CAD with distant MI  PVD/AAA  HTN  Hyperlipidemia  No TTE in our system, wife reports no history of CHF.  Known history of carotid disease as noted above.  Recent incidental finding of 3.2 cm AAA which will be monitored outpatient.  - hold PTA metoprolol for permissive HTN  - labetalol prn    Diabetes mellitus type II  Hgb A1C 7.9% in December 2016.  PTA regimen includes glipizide 10 mg daily and metformin 1000 mg twice daily.  - Hold oral medications  - medium dose sliding scale insulin    BPH  Continue PTA Flomax.    DVT Prophylaxis: Pneumatic Compression Devices  Code Status: Full Code    Disposition: Expected discharge in 1-2 days once work-up complete.    Blaze Rose    Primary Care Physician  Matt Morrissey    Chief Complaint  Slurred speech, right facial droop, confusion    History is obtained  "from the patient, his wife and chart review    History of Present Illness  Dustin Pearce is a 88 year old male who presents with slurred speech, right facial droop and confusion.  The majority of history is obtained from the patient's wife secondary to the patient's difficulty hearing.  She reports he got out of bed this morning and went to the bathroom.  When he came out of the bathroom, he had dysarthric speech, right facial droop and was off balance. He had no complaints of chest pain or pressure or palpitations at that time.  She called EMS and he was brought to the emergency room.  She reports he has recently been experiencing intermittent \"dizzy spells\" where he loses his balance, but may be more related to lightheadedness.  She reports the episode today was similar in nature with the exception of the change in his speech and facial droop.  She believes his symptoms have largely resolved with the exception of some mild ongoing confusion.  The patient corroborates this history though he states he feels back to baseline.  He denies any cardiorespiratory symptoms, and denies any other neurologic symptoms aside from headache which is now resolved.  His remainder review of systems is negative.    Past Medical History   History of CVA in 1999 without residual deficits  Abdominal aortic aneurysm  Coronary artery disease with MI in the 1970s  Hypertension  Hyperlipidemia  Chronic left carotid occlusion  Chronic right carotid stenosis  Recurrent nephrolithiasis  Diabetes mellitus type 2  GERD  Benign prostatic hypertrophy  Macular degeneration    Past Surgical History  Cholecystectomy  Hernia repair  Rotator cuff repair  Lithotripsy 2  Nephrolithotomy    Prior to Admission Medications  Prior to Admission Medications   Prescriptions Last Dose Informant Patient Reported? Taking?   ATORVASTATIN CALCIUM PO 1/19/2017 at Unknown time  Yes Yes   Sig: Take 80 mg by mouth daily   Clopidogrel Bisulfate, [id number], (PLAVIX PO) " "1/19/2017 at Unknown time  Yes Yes   Sig: Take 75 mg by mouth daily    HYDROcodone-acetaminophen (NORCO) 5-325 MG per tablet prn  Yes Yes   Sig: Take 1-2 tablets by mouth every 4 hours as needed for moderate to severe pain   METFORMIN HCL PO 1/19/2017 at Unknown time  Yes Yes   Sig: Take 1,000 mg by mouth 2 times daily (with meals)   Psyllium (METAMUCIL PO) 1/19/2017 at Unknown time  Yes Yes   Sig: Take 1 packet by mouth daily    TAMSULOSIN HCL PO 1/19/2017 at Unknown time  Yes Yes   Sig: Take 0.4 mg by mouth daily    VITAMIN D, CHOLECALCIFEROL, PO Past Week at Unknown time  Yes Yes   Sig: Take 1,000 Units by mouth daily    blood glucose monitoring (NO BRAND SPECIFIED) test strip   No No   Sig: Use to test blood sugar three times daily or as directed.   glipiZIDE (GLIPIZIDE XL) 10 MG 24 hr tablet 1/19/2017 at Unknown time  No Yes   Sig: Take 1 tablet (10 mg) by mouth daily   metoprolol (LOPRESSOR) 50 MG tablet 1/19/2017 at Unknown time  No Yes   Sig: Take 1 tablet (50 mg) by mouth 2 times daily   omeprazole (PRILOSEC) 40 MG capsule 1/19/2017 at Unknown time  No Yes   Sig: Take 1 capsule (40 mg) by mouth daily Take 30-60 minutes before a meal.   order for DME   No No   Sig: Equipment being ordered: True Matrix Blood Glucose meter.      Facility-Administered Medications: None     Allergies  Allergies   Allergen Reactions     Oxycodone Other (See Comments)     \"TERRIBLE SWEATING\"     Sulfa Drugs      Tetracycline        Social History  50-pack-year history of tobacco use and quit in 1989.  Rare alcohol use.  Ambulates with a cane.  Resides at home with his wife.    Family History  No significant family history.    Review of Systems  The 10 point Review of Systems is negative other than noted in the HPI or here.     Physical Exam  Temp: 98.5  F (36.9  C) Temp src: Oral BP: 146/58 mmHg Pulse: 58 Heart Rate: 64 Resp: 15 SpO2: 94 % O2 Device: None (Room air)    Vital Signs with Ranges  Temp:  [98.5  F (36.9  C)] 98.5  F " (36.9  C)  Pulse:  [58] 58  Heart Rate:  [58-64] 64  Resp:  [14-15] 15  BP: (146-162)/(57-80) 146/58 mmHg  SpO2:  [94 %-100 %] 94 %  152 lbs 1.88 oz    Constitutional: Well developed, well nourished male in no acute distress  Eyes: Pupils equal round and reactive to light, extraocular movements intact, anicteric  HEENT: Head normocephalic, atraumatic, oropharynx clear, mucous membranes moist  Respiratory: Clear to auscultation bilaterally, no crackles or wheezes  Cardiovascular: Regular rate and rhythm, S1/S2 with 2/6 systolic murmur  GI: Abdomen soft, non-tender, non-distended, normal bowel sounds, no hepatosplenomegaly  Lymph/Hematologic: No edema, no lymphadenopathy  Skin: No rash or bruising  Musculoskeletal: Extremities warm and well perfused  Neurologic: Cranial nerves II-XII intact, 5/5 strength in extremities, sensation to light touch intact, coordination intact, romberg negative, oriented to person place and partially to date  Psychiatric: normal affect    Data  Data reviewed today:  I personally reviewed the EKG tracing showing sinus rhythm with right bundle block, unchanged from prior.    Recent Labs  Lab 01/20/17  1030 01/16/17  1033   WBC 5.4 5.2   HGB 13.4 13.0*   MCV 94 94   * 175   INR 1.03  --     137   POTASSIUM 4.2 4.9   CHLORIDE 104 101   CO2 30 30   BUN 16 14   CR 0.73 0.72   ANIONGAP 6 6   ALLISON 8.6 8.9   * 337*   TROPI 0.018  --        Recent Results (from the past 24 hour(s))   CT Head w/o Contrast    Narrative    CT SCAN OF THE HEAD WITHOUT CONTRAST   1/20/2017 10:45 AM     HISTORY: Stroke.    TECHNIQUE:  Axial images of the head and coronal reformations without  IV contrast material.  Radiation dose for this scan was reduced using  automated exposure control, adjustment of the mA and/or kV according  to patient size, or iterative reconstruction technique.    COMPARISON: 8/19/2009.    FINDINGS: There is an old left frontal infarct. Mild-to-moderate  cerebral atrophy is  present. Patchy white matter changes are seen in  both hemispheres without mass effect. There is no evidence for  intracranial hemorrhage, acute infarct, mass effect, or skull  fracture.      Impression    IMPRESSION: Chronic changes. No evidence for intracranial hemorrhage  or any acute process.   CTA Angiogram Head Neck    Narrative    CT ANGIOGRAM OF THE HEAD AND NECK WITHOUT AND WITH CONTRAST  1/20/2017  10:58 AM     HISTORY: Right-sided weakness and slurred speech that have resolved.  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    TECHNIQUE:  Precontrast localizing scans were followed by CT  angiography with an injection of 70 mL Isovue-370 IV with scans  through the head and neck.  Images were transferred to a separate 3-D  workstation where multiplanar reformations and 3-D images were  created.  Estimates of carotid stenoses are made relative to the  distal internal carotid artery diameters except as noted. Radiation  dose for this scan was reduced using automated exposure control,  adjustment of the mA and/or kV according to patient size, or iterative  reconstruction technique.      COMPARISON: MRI 8/20/2009.    CT HEAD FINDINGS:  No contrast enhancing lesions.  Cerebral blood flow  is grossly normal.     CT ANGIOGRAM HEAD FINDINGS:  Calcified plaque in the carotid siphons.  Left carotid siphon is completely occluded. Collateral flow to the  left middle cerebral artery is via patent anterior communicating  artery. No evidence of aneurysm or intracranial branch artery  occlusion. Severe stenosis of the left posterior cerebral artery P2  segment. This is new since previous exam. Venous circulation is  unremarkable.     CT ANGIOGRAM NECK FINDINGS:   Right carotid artery:  Calcified and noncalcified plaque in the  carotid bifurcation. Residual luminal diameter is 2.4 mm compared with  distal internal carotid diameter of 5.5 mm, indicating 57% diameter  stenosis by NASCET criteria.      Left carotid  artery: Extensive calcified and noncalcified plaque in  the distal common carotid artery and carotid bifurcation.  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      Vertebral arteries:  No significant stenosis.      Other findings: None.      Impression    IMPRESSION:   1. 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 No change.  2. Severe stenosis left posterior cerebral artery P2 segment, new  since 2009.  3. There is moderate stenosis of the right internal carotid artery at  its origin, 50% diameter narrowing by NASCET criteria.    I agree with the preliminary report that was communicated to the  referring physician.   CT Head w Contrast    Narrative    CT HEAD WITH CONTRAST 1/20/2017 11:26 AM     HISTORY: Stroke.    TECHNIQUE: Axial images were obtained through the brain with contrast  for CT brain perfusion imaging. 50 mL Isovue 370 was given. Radiation  dose for this scan was reduced using automated exposure control,  adjustment of the mA and/or kV according to patient size, or iterative  reconstruction technique.    FINDINGS: There is a moderate-sized old infarct in the left frontal  lobe. There are some areas of mildly altered perfusion in the left  hemispheric watershed areas in the region of the known left frontal  infarct and also in the left parietal region. This patient does have a  known left internal carotid artery occlusion which could explain these  findings. There is no convincing evidence for ischemia.      Impression    IMPRESSION:  1. 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  2. Altered left hemispheric perfusion presumably due to known occluded  internal carotid artery on a chronic basis.  3. No evidence for any ischemia.

## 2017-01-20 NOTE — IP AVS SNAPSHOT
MRN:[id number]                      After Visit Summary   1/20/2017    Dustin Pearce    MRN: [id number]           Thank you!     Thank you for choosing [facility] for your care. Our goal is always to provide you with excellent care. Hearing back from our patients is one way we can continue to improve our services. Please take a few minutes to complete the written survey that you may receive in the mail after you visit with us. Thank you!        Patient Information     Date Of Birth          1/31/1928        About your hospital stay     You were admitted on:  January 20, 2017 You last received care in the:  [facility]    You were discharged on:  January 21, 2017        Reason for your hospital stay       Transient slurred speech, unsteady gait and confusion.                  Who to Call     For medical emergencies, please call 911.  For non-urgent questions about your medical care, please call your primary care provider or clinic, [phone]          Attending Provider     Provider    Guru Norman MD Bechard, Paul, MD       Primary Care Provider Office Phone # Fax #    Matt Morrissey [phone]       [facility] [address] [place] [address]        After Care Instructions     Activity       Your activity upon discharge: activity as tolerated.            Diet       Follow this diet upon discharge:   Combination Diet 5520-0791 Calories: Moderate Consistent CHO (4-6 CHO units/meal); Dysphagia Diet Level 3: Advanced; Nectar Thickened Liquids (pre-thickened or use instant food thickener)                  Follow-up Appointments     Follow-up and recommended labs and tests        Follow up with primary care provider, Matt Morrissey, within 1-2 weeks, for hospital follow- up. No follow up labs or test are needed.                  Your next 10 appointments already scheduled     Feb 02, 2017  6:50 PM   [facility] Spine with Lesa Marin, PT  "  [facility] ([facility]  )    [address] [place] [address] [address]  [facility] [id number] [phone]            Feb 10, 2017  1:30 PM   Office Visit with Matt Morrissey MD   [facility]    [address] [facility] [id number] [phone]           Bring a current list of meds and any records pertaining to this visit.  For Physicals, please bring immunization records and any forms needing to be filled out.  Please arrive 10 minutes early to complete paperwork.              Additional Services     Follow-Up with Cardiologist       Asked to request appt on behalf of hospitalist service.                  Pending Results     Date and Time Order Name Status Description    1/20/2017 1355 Echocardiogram - bubble study In process     1/20/2017 1037 EKG 12-lead, tracing only Preliminary     1/20/2017 1030 Vitamin D Deficiency In process             Statement of Approval     Ordered          01/21/17 8966  I have reviewed and agree with all the recommendations and orders detailed in this document.   EFFECTIVE NOW     Approved and electronically signed by:  Tra Reynoso MD             Admission Information        Provider Department Dept Phone    1/20/2017 Blaze Rose MD  [facility] [phone]      Your Vitals Were     Blood Pressure Pulse Temperature    156/71 mmHg 58 98.6  F (37  C) (Oral)    Respirations Height Weight    16 1.651 m (5' 5\") 69 kg (152 lb 1.9 oz)    BMI (Body Mass Index) Pulse Oximetry       25.31 kg/m2 94%       MyChart Information     [facility] lets you send messages to your doctor, view your test results, renew your prescriptions, schedule appointments and more. To sign up, go to [email or web address] . Click on \"Log in\" on the left side of the screen, which will take you to the Welcome page. Then click on \"Sign up Now\" on the right side of the page.     You will be asked to " enter the access code listed below, as well as some personal information. Please follow the directions to create your username and password.     Your access code is: [email or web address]  Expires: 2017  1:08 PM     Your access code will  in 90 days. If you need help or a new code, please call your [facility] clinic or [phone].        Care EveryWhere ID     This is your Care EveryWhere ID. This could be used by other organizations to access your [facility] medical records  [id number]           Review of your medicines      START taking        Dose / Directions    aspirin 81 MG EC tablet   Used for:  Transient cerebral ischemia, unspecified type        Dose:  81 mg   Take 1 tablet (81 mg) by mouth daily   Quantity:  30 tablet   Refills:  0       lisinopril 2.5 MG tablet   Commonly known as:  PRINIVIL/Zestril   Used for:  Coronary artery disease involving native coronary artery of native heart without angina pectoris        Dose:  2.5 mg   Take 1 tablet (2.5 mg) by mouth daily   Quantity:  30 tablet   Refills:  0         CONTINUE these medicines which have NOT CHANGED        Dose / Directions    ATORVASTATIN CALCIUM PO        Dose:  80 mg   Take 80 mg by mouth daily   Refills:  0       blood glucose monitoring test strip   Commonly known as:  no brand specified   Used for:  Hypoglycemia        Use to test blood sugar three times daily or as directed.   Quantity:  300 each   Refills:  3       glipiZIDE 10 MG 24 hr tablet   Commonly known as:  glipiZIDE XL   Used for:  Type 2 diabetes mellitus with diabetic peripheral angiopathy without gangrene, without long-term current use of insulin (H)        Dose:  10 mg   Take 1 tablet (10 mg) by mouth daily   Quantity:  90 tablet   Refills:  3       HYDROcodone-acetaminophen 5-325 MG per tablet   Commonly known as:  NORCO        Dose:  1-2 tablet   Take 1-2 tablets by mouth every 4 hours as needed for moderate to severe pain   Refills:  0       METAMUCIL PO        Dose:  1  packet   Take 1 packet by mouth daily   Refills:  0       METFORMIN HCL PO        Dose:  1000 mg   Take 1,000 mg by mouth 2 times daily (with meals)   Refills:  0       metoprolol 50 MG tablet   Commonly known as:  LOPRESSOR   Used for:  Essential hypertension, benign        Dose:  50 mg   Take 1 tablet (50 mg) by mouth 2 times daily   Quantity:  60 tablet   Refills:  1       omeprazole 40 MG capsule   Commonly known as:  priLOSEC   Used for:  Other acute gastritis without hemorrhage        Dose:  40 mg   Take 1 capsule (40 mg) by mouth daily Take 30-60 minutes before a meal.   Quantity:  90 capsule   Refills:  0       order for DME   Used for:  Type 2 diabetes mellitus without complication (H)        Equipment being ordered: True Matrix Blood Glucose meter.   Quantity:  1 Device   Refills:  0       PLAVIX PO   Used for:  Cough        Dose:  75 mg   Take 75 mg by mouth daily   Refills:  0       TAMSULOSIN HCL PO        Dose:  0.4 mg   Take 0.4 mg by mouth daily   Refills:  0       VITAMIN D (CHOLECALCIFEROL) PO        Dose:  1000 Units   Take 1,000 Units by mouth daily   Refills:  0            Where to get your medicines      These medications were sent to [id number] - [place], [place] - [address] [facility] [address]  [address] [facility] [id number]    Hours:  24-hours Phone:  [phone]    - aspirin 81 MG EC tablet  - lisinopril 2.5 MG tablet             Protect others around you: Learn how to safely use, store and throw away your medicines at www.disposemymeds.org.             Medication List: This is a list of all your medications and when to take them. Check marks below indicate your daily home schedule. Keep this list as a reference.      Medications           Morning Afternoon Evening Bedtime As Needed    aspirin 81 MG EC tablet   Take 1 tablet (81 mg) by mouth daily   Last time this was given:  81 mg on 1/21/2017  8:22 AM                                ATORVASTATIN  CALCIUM PO   Take 80 mg by mouth daily   Last time this was given:  80 mg on 1/20/2017  9:36 PM                                blood glucose monitoring test strip   Commonly known as:  no brand specified   Use to test blood sugar three times daily or as directed.                                glipiZIDE 10 MG 24 hr tablet   Commonly known as:  glipiZIDE XL   Take 1 tablet (10 mg) by mouth daily                                HYDROcodone-acetaminophen 5-325 MG per tablet   Commonly known as:  NORCO   Take 1-2 tablets by mouth every 4 hours as needed for moderate to severe pain                                lisinopril 2.5 MG tablet   Commonly known as:  PRINIVIL/Zestril   Take 1 tablet (2.5 mg) by mouth daily                                METAMUCIL PO   Take 1 packet by mouth daily                                METFORMIN HCL PO   Take 1,000 mg by mouth 2 times daily (with meals)                                metoprolol 50 MG tablet   Commonly known as:  LOPRESSOR   Take 1 tablet (50 mg) by mouth 2 times daily                                omeprazole 40 MG capsule   Commonly known as:  priLOSEC   Take 1 capsule (40 mg) by mouth daily Take 30-60 minutes before a meal.                                order for DME   Equipment being ordered: True Matrix Blood Glucose meter.                                PLAVIX PO   Take 75 mg by mouth daily   Last time this was given:  75 mg on 1/21/2017  8:22 AM                                TAMSULOSIN HCL PO   Take 0.4 mg by mouth daily   Last time this was given:  0.4 mg on 1/20/2017  4:38 PM                                VITAMIN D (CHOLECALCIFEROL) PO   Take 1,000 Units by mouth daily

## 2017-01-20 NOTE — PHARMACY-ADMISSION MEDICATION HISTORY
Admission medication history interview status for the 1/20/2017  admission is complete. See EPIC admission navigator for prior to admission medications     Medication history source reliability:Good    Actions taken by pharmacist (provider contacted, etc):None     Additional medication history information not noted on PTA med list :None    Medication reconciliation/reorder completed by provider prior to medication history? No    Time spent in this activity: 15min    Prior to Admission medications    Medication Sig Last Dose Taking? Auth Provider   HYDROcodone-acetaminophen (NORCO) 5-325 MG per tablet Take 1-2 tablets by mouth every 4 hours as needed for moderate to severe pain prn Yes Unknown, Entered By History   metoprolol (LOPRESSOR) 50 MG tablet Take 1 tablet (50 mg) by mouth 2 times daily 1/19/2017 at Unknown time Yes Paloma Chen MD   omeprazole (PRILOSEC) 40 MG capsule Take 1 capsule (40 mg) by mouth daily Take 30-60 minutes before a meal. 1/19/2017 at Unknown time Yes Paloma Chen MD   glipiZIDE (GLIPIZIDE XL) 10 MG 24 hr tablet Take 1 tablet (10 mg) by mouth daily 1/19/2017 at Unknown time Yes Matt Morrissey MD   TAMSULOSIN HCL PO Take 0.4 mg by mouth daily  1/19/2017 at Unknown time Yes Reported, Patient   METFORMIN HCL PO Take 1,000 mg by mouth 2 times daily (with meals) 1/19/2017 at Unknown time Yes Reported, Patient   ATORVASTATIN CALCIUM PO Take 80 mg by mouth daily 1/19/2017 at Unknown time Yes Reported, Patient   Psyllium (METAMUCIL PO) Take 1 packet by mouth daily  1/19/2017 at Unknown time Yes Reported, Patient   VITAMIN D, CHOLECALCIFEROL, PO Take 1,000 Units by mouth daily  Past Week at Unknown time Yes Reported, Patient   Clopidogrel Bisulfate, [id number], (PLAVIX PO) Take 75 mg by mouth daily  1/19/2017 at Unknown time Yes Reported, Patient   blood glucose monitoring (NO BRAND SPECIFIED) test strip Use to test blood sugar three times daily or as directed.   Matt Morrissey MD   order for  DME Equipment being ordered: True Matrix Blood Glucose meter.   Matt Morrissey MD

## 2017-01-20 NOTE — CONSULTS
[facility] [place]  [facility]    Vascular Neurology / Stroke Consultation Note     Dustin Pearce MRN# [id number]   YOB: 1928 Age: 88 year old    Code Status:Prior   Date of Admission: 1/20/2017  Date of Consult: 01/20/2017    _________________________________   Primary Care Physician  Matt Morrissey  ______________________________________________         Assessment and Plan  ______________________________________________  (I63.032) Cerebrovascular accident (CVA) due to thrombosis of left carotid artery (H)  --Small left MCA stroke vs TIA  --Not a tPA candidate due to complete resolution of symptoms  --Check Plavix reactivity  --Add aspirin 81 mg   --Stroke workup  (I65.22) Left carotid artery occlusion    --Chronic ICA occlusion  (G93.89) Encephalomalacia  --Secondary to left MCA stroke in 1999  #. DVT Prophylaxis  --Mechanical   #. PT/OT/Speech  --Startevaluations  #. Nutrition / GI Prophylaxis  --Per recommendations of speech therapy      #. Code Status: Full Code     TIME:   Neurocritical care time:  60 minutes for evaluation and management of Code stroke evaluation. Patient is critically ill   ----------------------------------------------------------------------------------  ----------------------------------------------------------------------------------  Reason for consult: Code stroke    Chief Complaint  ______________________________________________  Aphasia  History is obtained from the patient    History of Present Illness  ______________________________________________  88 year old male with a PMH significant for CAD and CVA who presents by EMS to the emergency department today with stroke like symptoms fist noticed at 0915. Symptoms were first noted as slurred speech and difficulty finding words. Spouse at that time noted significant right sided deficits and weakness to extremities. When EMS arrived, patient noted that his symptoms were somewhat  improving, but returned with new numbness en route. Spouse reports that patient suffered a major stroke in 1999 and had good recovery. Patient also complains of slight headache and sluggishness. He denies chest pain, trouble breathing, visual disturbances, or neck pain. 191 blood sugar measured en route. Code stroke called. CT head demonstrated chronic encephalomalacia in the left MCA territory. L ICA chronic occlusion with patent MCA. Tight stenosis in the left PCA. No perfusion deficits.   On repeat examination, no language deficits, no weakness, no numbness. No headache.   Past Medical History   ______________________________________________  Past Medical History   Diagnosis Date     CAD (coronary artery disease)      Hypertension      Hyperlipidemia      DJD (degenerative joint disease)      Osteopenia      Nephrolithiasis      RECURRENT     Carotid occlusion, left      Diabetes mellitus (H)      PONV (postoperative nausea and vomiting)      Heart attack (H)      Unspecified cerebral artery occlusion with cerebral infarction      Gastro-oesophageal reflux disease      Coronary artery disease involving native coronary artery of native heart without angina pectoris 10/20/2016     Mild cognitive impairment 10/20/2016     Benign non-nodular prostatic hyperplasia with lower urinary tract symptoms 10/20/2016     Gastroesophageal reflux disease without esophagitis 10/20/2016     Carotid artery stenosis 10/20/2016     Benign essential hypertension 1/6/2017     Past Surgical History  ______________________________________________  Past Surgical History   Procedure Laterality Date     Cholecystectomy       Hernia repair       Genitourinary surgery       KIDNEY STONE REMOVAL X 4     Laser holmium lithotripsy ureter(s), insert stent, combined  3/2/2012     Procedure:COMBINED CYSTOSCOPY, URETEROSCOPY, LASER HOLMIUM LITHOTRIPSY URETER(S), INSERT STENT; CYSTOSCOPY, RIGHT RETROGRADES, RIGHT URETEROSCOPY, HOLMIUM LASER, RIGHT  "STENT PLACEMENT; Surgeon:ANJELICA LEÓN; Location: OR     Rotator cuff repair rt/lt       Esophagoscopy, gastroscopy, duodenoscopy (egd), combined N/A 12/26/2016     Procedure: COMBINED ESOPHAGOSCOPY, GASTROSCOPY, DUODENOSCOPY (EGD);  Surgeon: Nasim Louis MD;  Location:  GI     Hc ugi endoscopy w eus N/A 12/29/2016     Procedure: COMBINED ENDOSCOPIC ULTRASOUND, ESOPHAGOSCOPY, GASTROSCOPY, DUODENOSCOPY (EGD);  Surgeon: Nasim Louis MD;  Location:  GI     Prior to Admission Medications  ______________________________________________  Prior to Admission Medications   Prescriptions Last Dose Informant Patient Reported? Taking?   ATORVASTATIN CALCIUM PO   Yes No   Sig: Take 80 mg by mouth daily   Clopidogrel Bisulfate, [id number], (PLAVIX PO)   Yes No   Sig: Take 75 mg by mouth daily    DULoxetine (CYMBALTA) 30 MG EC capsule   No No   Sig: Take 1 capsule (30 mg) by mouth daily   METFORMIN HCL PO   Yes No   Sig: Take 1,000 mg by mouth 2 times daily (with meals)   Psyllium (METAMUCIL PO)   Yes No   Sig: Take 1 packet by mouth daily    TAMSULOSIN HCL PO   Yes No   Sig: Take 0.4 mg by mouth daily    VITAMIN D, CHOLECALCIFEROL, PO   Yes No   Sig: Take 1,000 Units by mouth daily    blood glucose monitoring (NO BRAND SPECIFIED) test strip   No No   Sig: Use to test blood sugar three times daily or as directed.   glipiZIDE (GLIPIZIDE XL) 10 MG 24 hr tablet   No No   Sig: Take 1 tablet (10 mg) by mouth daily   metoprolol (LOPRESSOR) 50 MG tablet   No No   Sig: Take 1 tablet (50 mg) by mouth 2 times daily   omeprazole (PRILOSEC) 40 MG capsule   No No   Sig: Take 1 capsule (40 mg) by mouth daily Take 30-60 minutes before a meal.   order for DME   No No   Sig: Equipment being ordered: True Matrix Blood Glucose meter.      Facility-Administered Medications: None     Allergies  Allergies   Allergen Reactions     Oxycodone Other (See Comments)     \"TERRIBLE SWEATING\"     Sulfa Drugs      Tetracycline  " "      Social History  ______________________________________________  Social History     Social History     Marital Status:      Spouse Name: N/A     Number of Children: N/A     Years of Education: N/A     Social History Main Topics     Smoking status: Former Smoker -- 1.00 packs/day for 30 years     Types: Cigarettes     Quit date: 01/01/1999     Smokeless tobacco: Never Used     Alcohol Use: 4.2 oz/week     7 Standard drinks or equivalent per week     Drug Use: No     Sexual Activity: No     Other Topics Concern     None     Social History Narrative       Family History  ______________________________________________  Family History   Problem Relation Age of Onset     Heart Failure Father 81     Breast Cancer Mother        Review of Systems  ______________________________________________  A comprehensive review of  10 systems was performed and found to be negative except as described in this note  CONSTITUTIONAL: negative for fever, chills, change in weight  INTEGUMENTARY/SKIN: no rash or obvious new lesions  ENT/MOUTH: no sore throat, new sinus pain or nasal drainage, no neck mass noted  RESP: No pleuretic pain, No cough, no hemoptysis, No SOB   CV: negative for chest pain, palpitations or peripheral edema  GI: no nausea, vomiting, change in stools  : no dysuria or hematuria  MUSCULOSKELETAL: no myalgias, arthralgias or join efffusion  ENDOCRINE: no history of polyuria, polydyspsia or symptoms of thyroid dysfunction  PSYCHIATRIC: no change in mood stable  LYMPHATIC: no new lymphadenopathy  HEME: no bleeding or easy bruisability  NEURO: see HPI    Physical Exam  ______________________________________________  Weight:152 lbs 1.88 oz; Height:5' 5\"  Temp: 98.5  F (36.9  C) Temp src: Oral BP: 148/80 mmHg Pulse: 58 Heart Rate: 58 Resp: 14 SpO2: 95 % O2 Device: None (Room air)    General Appearance:  No acute distress  Neuro:       Mental Status Exam:   Awake, alert, oriented X3. Speech and language are " intact. Mental status is normal       Cranial Nerves:  Pupils 3 mm, reactive. EOMI. Face sensation is normal. Face is symmetric. Bilaterally limited hearing. Tongue and uvula are midline. Other CN are normal           Motor:  5/5 X 4. Tone and bulk are normal           Reflexes:  Normal DTR.Toes downgoing.        Sensory:  Normal to PP, LT, vibration and ISREAL                   Coordination:   Intact finger-to-nose and toe-to-finger tests       Gait:  Untestable.  Neck: no nuchal rigidity, normal thyroid. No carotid bruits.    Cardiovascular: Regular rate and rhythm, no m/r/g  Lungs: Clear to auscultation  Abdomen: Soft, not tender, not distended  Extremities: No clubbing, no cyanosis, no edema    Data  ______________________________________________  All Data personally reviewed:       Labs:   CBC RESULTS:     Recent Labs  Lab 01/20/17  1030 01/16/17  1033   WBC 5.4 5.2   RBC 4.34* 4.18*   HGB 13.4 13.0*   HCT 40.8 39.3*   * 175     Basic Metabolic Panel:   Recent Labs   Lab Test  01/20/17   1030  01/16/17   1033  12/27/16   0650   NA  140  137  138   POTASSIUM  4.2  4.9  5.3   CHLORIDE  104  101  104   CO2  30  30  27   BUN  16  14  15   CR  0.73  0.72  0.72   GLC  193*  337*  203*   ALLISON  8.6  8.9  8.4*     Liver panel:  Recent Labs   Lab Test  12/27/16   0650  12/25/16   0002  10/20/16   1825  08/19/09   2100   PROTTOTAL  6.5*  7.1  7.3  6.4*   ALBUMIN  2.8*  3.7  3.7  4.0   BILITOTAL  0.7  0.6  0.4  0.3   ALKPHOS  90  58  68  63   AST  43  19  17  33   ALT  50  21  26  31     INR:  Recent Labs   Lab Test  08/19/09   2100   INR  0.99      Lipid Profile:  Recent Labs   Lab Test  10/20/16   1825 12/18/15 12/05/14 11/22/13   CHOL  166  198  160  151   HDL  40  39*  48  41   LDL  80  123  82  71   TRIG  229*  181*  149  197*   CHOLHDLRATIO   --   5.1*  3.3  3.7     Thyroid Panel:  Recent Labs   Lab Test  10/20/16   1825   TSH  4.63*   T4  0.94      Vitamin B12: No lab results found.   Vitamin D level: No lab  results found.  A1C:   Recent Labs   Lab Test  12/26/16   0643  10/20/16   1825 12/05/14 08/14/14 11/25/13   A1C  7.9*  8.5*  7.1*  7.3*  7.3*     Troponin I:   Recent Labs   Lab Test  12/25/16   0002   TROPI  0.022     Ammonia: No lab results found.  CK: No lab results found.     CRP inflammation:   Recent Labs   Lab Test  12/27/16   0650   CRP  136.0*     ESR: No lab results found.    ARMANDO: No lab results found.    ANCA: No lab results found.   Drug Screen: No lab results found.  Alcohol level:No lab results found.  UA Results:  Recent Labs   Lab Test  12/25/16   0204   COLOR  Light Yellow   APPEARANCE  Clear   URINEGLC  Negative   URINEBILI  Negative   URINEKETONE  10*   SG  1.042*   UBLD  Negative   URINEPH  6.0   PROTEIN  Negative   NITRITE  Negative   LEUKEST  Negative   RBCU  1   WBCU  <1          Imaging:   All imaging studies were reviewed personally  CT head:   --left anterior MCA encephalomalacia. Nothing acute  CTA neck/head:  --Left ICA occlusion, left PCA stenosis  --No perfusion deficits.

## 2017-01-20 NOTE — ED NOTES
Bed: ST01  Expected date:   Expected time:   Means of arrival:   Comments:  wade Nieves's stroke alert eta 1022

## 2017-01-20 NOTE — PROGRESS NOTES
01/20/17 1441   General Information   Onset Date 01/20/17   Start of Care Date 01/20/17   Referring Physician Dr. Rose   Patient Profile Review/OT: Additional Occupational Profile Info See Profile for full history and prior level of function   Patient/Family Goals Statement Pt's goal is to eat   Swallowing Evaluation Bedside swallow evaluation   Behaviorial Observations Alert   Mode of current nutrition NPO   Respiratory Status Room air   Comments Pt is a 88 year old male with history of CVA, CAD with distant MI, HTN, hyperlipidemia, DM II, chronic left carotic occlusion and right carotid stenosis among other chronic medical issues who presents with dysarthria, right facial droop and ataxia.  Wife indicated facial droop has improved since this am.  Pt and wife report h/o of dysphagia and had recent OP video swallow study in Dec. of 2016.     Clinical Swallow Evaluation   Oral Musculature (mild (L) sided weakness)   Structural Abnormalities none present   Dentition present and adequate   Mucosal Quality adequate   Mandibular Strength and Mobility intact   Oral Labial Strength and Mobility (mild (L) sided weakness)   Lingual Strength and Mobility (minimal deviation to (R))   Buccal Strength and Mobility (mild (L) sided weakness)   Additional Documentation Yes   Clinical Swallow Eval: Thin Liquid Texture Trial   Mode of Presentation, Thin Liquids spoon;cup;self-fed;fed by clinician   Volume of Liquid or Food Presented small sips to consecutive drinks   Oral Phase of Swallow Premature pharyngeal entry   Pharyngeal Phase of Swallow impaired;reduction in laryngeal movement;other (see comments)  (variable swallow delay)   Diagnostic Statement Pt with inconsistent swallow delay and reduced laryngeal elevation.  Pt with s/sx of premature spillage.  Pt with cough noted on 3/4 trials of thin liquids despite strategies of small sips/ chin tuck   Clinical Swallow Eval: Nectar Thick Liquid Texture Trial   Mode of  Presentation, Nectar cup;self-fed   Volume of Nectar Presented 4 oz of nectar thick liquids   Oral Phase, Morse WFL   Pharyngeal Phase, Nectar reduction in laryngeal movement   Diagnostic Statement Pt tolerated nectar thick liquids w/o outward s/sx of aspiration   Clinical Swallow Eval: Puree Solid Texture Trial   Mode of Presentation, Puree spoon;fed by clinician   Volume of Puree Presented 4 oz of pudding   Oral Phase, Puree WFL   Pharyngeal Phase, Puree intact   Diagnostic Statement Pt tolerated puree solids w/o outward s/sx of aspiration   Clinical Swallow Eval: Semisolid Texture Trial   Mode of Presentation, Semisolid fed by clinician;spoon   Volume of Semisolid Food Presented bite sized cracker with pudding coating   Oral Phase, Semisolid (slight extra time for mastication but adequate)   Pharyngeal Phase, Semisolid intact   Diagnostic Statement Pt took slight extra time for mastication and oral transit.  No outward s/sx of aspiration or pharyngeal residuals.   Swallow Compensations   Swallow Compensations Reduce amounts   Esophageal Phase of Swallow   Patient reports or presents with symptoms of esophageal dysphagia No   General Therapy Interventions   Planned Therapy Interventions Dysphagia Treatment   Dysphagia treatment Compensatory strategies for swallowing;Instruction of safe swallow strategies   Swallow Eval: Clinical Impressions   Skilled Criteria for Therapy Intervention Skilled criteria met.  Treatment indicated.   Functional Assessment Scale (FAS) 4   Treatment Diagnosis Mild-moderate oropharyngeal dysphagia   Diet texture recommendations Dysphagia diet level 3;Nectar thick liquids   Recommended Feeding/Eating Techniques no straws;small sips/bites;maintain upright posture during/after eating for 30 mins   Demonstrates Need for Referral to Another Service occupational therapy;physical therapy   Therapy Frequency daily   Predicted Duration of Therapy Intervention (days/wks) 1 week   Anticipated  Discharge Disposition home w/ outpatient services   Risks and Benefits of Treatment have been explained. Yes   Patient, family and/or staff in agreement with Plan of Care Yes   Clinical Impression Comments SLP: Pt seen for a bedside swallow evaluation upon admission for facial weakness and dysarthria.  Pt noted to have mild continued facial weakness on (L) side and minimal dysarthria.  Pt reports h/o  dysphagia and recently had OP video swallow study completed last month.  Pt presents with mild-moderate oropharyngeal dysphagia. Pt with mild (L) sided weakness, delayed swallow response along with reduced laryngeal elevation placing pt at risk for premature spillage and aspiration with thin liquids. Pt with s/sx of penetration or aspiration with thin liquids. Pt tolerated nectar thick liquids and solids well w/o outward s/sx of aspiration.  Did need extra time for mastication of solids.  Recommended diet is dysphagia diet level 3 with nectar thick liquids, pt to take small sips/bites, do not use straws, be fully alert and upright for all PO.  Medications can be given in nectar thick liquids.  ST will plan to f/u for diet tolerance.    Total Evaluation Time   Total Evaluation Time (Minutes) 20

## 2017-01-20 NOTE — ED PROVIDER NOTES
"  History     Chief Complaint:  Stroke-like symptoms    HPI   Dustin Pearce is a 88 year old male with a PMH significant for CAD and CVA who presents by EMS to the emergency department today with stroke like symptoms fist noticed at 0915. Symptoms were first noted as slurred speech and difficulty finding words. Spouse at that time noted significant right sided deficits and weakness to extremities. When EMS arrived, patient noted that his symptoms were somewhat improving, but returned with new numbness en route. Spouse reports that patient suffered a major stroke in 1999 and had good recovery. Patient also complains of slight headache and sluggishness. He denies chest pain, trouble breathing, visual disturbances, or neck pain. 191 blood sugar measured en route.     Cardiac/PE/DVT Risk Factors:  The patient has a history of CAD, hypertension, hyperlipidemia, diabetes, and smoking. He reports no family history of CAD. The patient denies any personal or familial history of PE, DVT, or clotting disorder. The patient reports no recent travel, surgery, or other immobilizations. He reports no history of cancer and no use of hormone therapy at this time.     Allergies:  Oxycodone - \"terrible sweating\"  Sulfa Drugs  Tetracycline     Medications:    tramadol (ULTRAM) 50 MG tablet  glipizide (GLIPIZIDE XL) 10 MG 24 hr tablet  TAMSULOSIN HCL PO  METFORMIN HCL PO  ATORVASTATIN CALCIUM PO  Ramipril (ALTACE PO)  Omeprazole (PRILOSEC PO)  Clopidogrel Bisulfate, [id number], (PLAVIX PO)     Past Medical History:     CAD  Hypertension  Hyperlipidemia  DJD  Osteopenia  Nephrolithiasis  Carotid occlusion, left  Diabetes  MI  GERD  CVA    Past Surgical History:     Cholecystectomy  Hernia repair  Kidney stone removal x4  Laser holmium lithotripsy ureter(s), insert stent, combined  Rotator cuff repair    Family History:     Father - heart failure  Mother - breast cancer    Social History:  The patient presents with wife.    Marital " "Status:      Former smoker - 1 ppd for 30 years, quit 1999  Positive for alcohol use - 7 drinks per week     Review of Systems   Eyes: Negative for visual disturbance.   Respiratory: Negative for shortness of breath.    Cardiovascular: Negative for chest pain.   Musculoskeletal: Negative for neck pain.   Neurological: Positive for weakness, numbness and headaches.   Psychiatric/Behavioral: Positive for confusion.   All other systems reviewed and are negative.    Physical Exam     Patient Vitals for the past 24 hrs:   BP Temp Temp src Pulse Heart Rate Resp SpO2 Height Weight   01/20/17 1100 146/58 mmHg - - - 64 15 94 % - -   01/20/17 1045 148/80 mmHg - - 58 58 14 95 % - -   01/20/17 1033 162/57 mmHg 98.5  F (36.9  C) Oral 58 58 14 100 % 1.651 m (5' 5\") 69 kg (152 lb 1.9 oz)        Physical Exam  SKIN:  Warm, dry.    HEMATOLOGIC/IMMUNOLOGIC/LYMPHATIC:  No pallor.  No limb edema.  HENT:  Moist oral mucosa.  Painless ROM head/neck.  EYES:  Conjunctivae normal.  Normal EOM.  Visual fields intact.  CARDIOVASCULAR:  Regular rate and rhythm.  No murmur.  RESPIRATORY:  No respiratory distress, breath sounds equal and normal.  GASTROINTESTINAL:  Soft, nontender abdomen.  MUSCULOSKELETAL:  Full upper and lower extremity active range of motion.  NEUROLOGIC:  Alert, conversant.  Oriented.  No aphasia.  No dysarthria.  No motor or sensory deficit.  No limb ataxia.  NIHSS score is zero.  PSYCHIATRIC:  Normal mood.    Emergency Department Course     ECG:  ECG taken at 1104, ECG read at 1109  Sinus rhythm with premature atrial complexes  Right bundle branch block  Abnormal ECG  Rate 66 bpm. IA interval 176. QRS duration 132. QT/QTc 462. P-R-T axes 81 247 -69.    Imaging:  Radiology findings were communicated with the patient who voiced understanding of the findings.    CTA angiogram head neck:  1. 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 No change.  2. Severe stenosis left " posterior cerebral artery P2 segment, new  since 2009.  3. There is moderate stenosis of the right internal carotid artery at  its origin, 50% diameter narrowing by NASCET criteria.  Reading per radiology    CT head w/o contrast:  Chronic changes. No evidence for intracranial hemorrhage  or any acute process.  Reading per radiology    CT head w/ contrast:  1. 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  2. Altered left hemispheric perfusion presumably due to known occluded  internal carotid artery on a chronic basis.  3. No evidence for any ischemia.  Reading per radiology    Laboratory:  Laboratory findings were communicated with the patient who voiced understanding of the findings.  CBC: WBC 5.4, HGB 13.4, (L)   BMP: Glucose: 193(H), Creatinine 0.73  Troponin: 0.018    PTT: 29  INR: 1.03    Interventions:  1102 NS 1000 mL IV      Emergency Department Course:  1027: Patient arrived to STAB room 1. I performed an exam of the patient as documented above. Nursing notes and vitals reviewed.   1031: Code stroke called. IV was inserted and blood was drawn for laboratory testing, results above.  1034: Patient taken for CT/CTA imaging.    1043: I spoke with Dr. Padilla of the neurology service regarding patient's presentation, findings, and plan of care.  1056: I spoke with Dr Padilla regarding his evaluation. TPA will not be started.    1111: I spoke with Dr. Villanueva of the radiology service regarding patient's imaging.     At 1115 the patient was rechecked and spouse states that the patient seems unchanged from symptoms onset.    I discussed the treatment plan with the patient. They expressed understanding of this plan and consented to admission. I discussed the patient with Dr Rose, who will admit the patient to a monitored bed for further evaluation and treatment.    I personally reviewed the laboratory results with the Patient and answered all related questions prior to admission.    Impression & Plan      Medical Decision  Making:  Dustin Pearce is a 88 year old male who presents with stuttering and, on arrival, what seemed to be near resolution of his stroke-like symptoms. He did improve to his baseline during his time in the ED. No findings of acute stroke on testing and the patient will be admitted for further close monitoring and treatment optimization.     Diagnosis:    ICD-10-CM    1. Left carotid artery occlusion I65.22 INR     Partial thromboplastin time   2. Cerebrovascular accident (CVA) due to thrombosis of left carotid artery (H) I63.032    3. Encephalomalacia G93.89    4. Cerebrovascular accident (CVA), unspecified mechanism (H) I63.9      Disposition:   Admission under hospitalists service.     Scribe Disclosure:  Graeme BOYD, am serving as a scribe at 10:35 AM on 1/20/2017 to document services personally performed by Guru Norman MD, based on my observations and the provider's statements to me.     EMERGENCY DEPARTMENT        Guru Norman MD  01/20/17 1842

## 2017-01-20 NOTE — IP AVS SNAPSHOT
[facility]    [address]    [place] [address]    Phone:  [phone]                                       After Visit Summary   1/20/2017    Dustin Pearce    MRN: [id number]           After Visit Summary Signature Page     I have received my discharge instructions, and my questions have been answered. I have discussed any challenges I see with this plan with the nurse or doctor.    ..........................................................................................................................................  Patient/Patient Representative Signature      ..........................................................................................................................................  Patient Representative Print Name and Relationship to Patient    ..................................................               ................................................  Date                                            Time    ..........................................................................................................................................  Reviewed by Signature/Title    ...................................................              ..............................................  Date                                                            Time

## 2017-01-20 NOTE — PLAN OF CARE
Problem: Goal Outcome Summary  Goal: Goal Outcome Summary  Outcome: No Change  A&O. Neuros intact. VSS.  NPO diet, speech currently doing swallow eval. Up with 1. Mostly Denies pain, though has a headache. Plan to continue to monitor.     Speech changed diet to mod carb DD3 with nectar thick, meds whole with nectar thick, if needed can put in apple sauce or pudding.

## 2017-01-20 NOTE — PLAN OF CARE
Problem: Goal Outcome Summary  Goal: Goal Outcome Summary  Outcome: Therapy, progress toward functional goals as expected  SLP: Pt seen for a bedside swallow evaluation upon admission for facial weakness and dysarthria.  Pt noted to have mild continued facial weakness on (L) side and minimal dysarthria.  Pt reports h/o  dysphagia and recently had OP video swallow study completed last month.  Pt presents with mild-moderate oropharyngeal dysphagia. Pt with mild (L) sided weakness, delayed swallow response along with reduced laryngeal elevation placing pt at risk for premature spillage and aspiration with thin liquids. Pt with s/sx of penetration or aspiration with thin liquids. Pt tolerated nectar thick liquids and solids well w/o outward s/sx of aspiration.  Did need extra time for mastication of solids.  Recommended diet is dysphagia diet level 3 with nectar thick liquids, pt to take small sips/bites, do not use straws, be fully alert and upright for all PO.  Medications can be given in nectar thick liquids.  ST will plan to f/u for diet tolerance.

## 2017-01-21 ENCOUNTER — APPOINTMENT (OUTPATIENT)
Dept: SPEECH THERAPY | Facility: CLINIC | Age: 82
End: 2017-01-21
Attending: HOSPITALIST
Payer: MEDICARE

## 2017-01-21 ENCOUNTER — APPOINTMENT (OUTPATIENT)
Dept: CARDIOLOGY | Facility: CLINIC | Age: 82
End: 2017-01-21
Attending: HOSPITALIST
Payer: MEDICARE

## 2017-01-21 VITALS
BODY MASS INDEX: 25.34 KG/M2 | WEIGHT: 152.12 LBS | HEART RATE: 58 BPM | TEMPERATURE: 98.5 F | RESPIRATION RATE: 16 BRPM | HEIGHT: 65 IN | SYSTOLIC BLOOD PRESSURE: 143 MMHG | OXYGEN SATURATION: 95 % | DIASTOLIC BLOOD PRESSURE: 65 MMHG

## 2017-01-21 LAB
CHOLEST SERPL-MCNC: 165 MG/DL
ERYTHROCYTE [DISTWIDTH] IN BLOOD BY AUTOMATED COUNT: 12.9 % (ref 10–15)
GLUCOSE BLDC GLUCOMTR-MCNC: 122 MG/DL (ref 70–99)
GLUCOSE BLDC GLUCOMTR-MCNC: 138 MG/DL (ref 70–99)
GLUCOSE BLDC GLUCOMTR-MCNC: 163 MG/DL (ref 70–99)
GLUCOSE BLDC GLUCOMTR-MCNC: 173 MG/DL (ref 70–99)
HCT VFR BLD AUTO: 37.2 % (ref 40–53)
HDLC SERPL-MCNC: 48 MG/DL
HGB BLD-MCNC: 12.3 G/DL (ref 13.3–17.7)
LDLC SERPL CALC-MCNC: 87 MG/DL
MCH RBC QN AUTO: 30.6 PG (ref 26.5–33)
MCHC RBC AUTO-ENTMCNC: 33.1 G/DL (ref 31.5–36.5)
MCV RBC AUTO: 93 FL (ref 78–100)
NONHDLC SERPL-MCNC: 117 MG/DL
PLATELET # BLD AUTO: 139 10E9/L (ref 150–450)
RBC # BLD AUTO: 4.02 10E12/L (ref 4.4–5.9)
TRIGL SERPL-MCNC: 148 MG/DL
WBC # BLD AUTO: 5.5 10E9/L (ref 4–11)

## 2017-01-21 PROCEDURE — 93306 TTE W/DOPPLER COMPLETE: CPT

## 2017-01-21 PROCEDURE — 25000128 H RX IP 250 OP 636: Performed by: HOSPITALIST

## 2017-01-21 PROCEDURE — 85027 COMPLETE CBC AUTOMATED: CPT | Performed by: HOSPITALIST

## 2017-01-21 PROCEDURE — 99217 ZZC OBSERVATION CARE DISCHARGE: CPT | Performed by: INTERNAL MEDICINE

## 2017-01-21 PROCEDURE — 80061 LIPID PANEL: CPT | Performed by: HOSPITALIST

## 2017-01-21 PROCEDURE — 25000132 ZZH RX MED GY IP 250 OP 250 PS 637: Mod: GY | Performed by: HOSPITALIST

## 2017-01-21 PROCEDURE — A9270 NON-COVERED ITEM OR SERVICE: HCPCS | Mod: GY | Performed by: HOSPITALIST

## 2017-01-21 PROCEDURE — 40000225 ZZH STATISTIC SLP WARD VISIT

## 2017-01-21 PROCEDURE — G0378 HOSPITAL OBSERVATION PER HR: HCPCS

## 2017-01-21 PROCEDURE — 00000146 ZZHCL STATISTIC GLUCOSE BY METER IP

## 2017-01-21 PROCEDURE — 93306 TTE W/DOPPLER COMPLETE: CPT | Mod: 26 | Performed by: INTERNAL MEDICINE

## 2017-01-21 PROCEDURE — 36415 COLL VENOUS BLD VENIPUNCTURE: CPT | Performed by: HOSPITALIST

## 2017-01-21 PROCEDURE — 96372 THER/PROPH/DIAG INJ SC/IM: CPT

## 2017-01-21 PROCEDURE — 92526 ORAL FUNCTION THERAPY: CPT | Mod: GN

## 2017-01-21 RX ORDER — LISINOPRIL 2.5 MG/1
2.5 TABLET ORAL DAILY
Qty: 30 TABLET | Refills: 0 | Status: SHIPPED | OUTPATIENT
Start: 2017-01-21 | End: 2017-01-27

## 2017-01-21 RX ADMIN — ASPIRIN 81 MG: 81 TABLET, COATED ORAL at 08:22

## 2017-01-21 RX ADMIN — INSULIN ASPART 1 UNITS: 100 INJECTION, SOLUTION INTRAVENOUS; SUBCUTANEOUS at 09:07

## 2017-01-21 RX ADMIN — SODIUM CHLORIDE: 9 INJECTION, SOLUTION INTRAVENOUS at 01:39

## 2017-01-21 RX ADMIN — CLOPIDOGREL BISULFATE 75 MG: 75 TABLET, FILM COATED ORAL at 08:22

## 2017-01-21 RX ADMIN — INSULIN ASPART 1 UNITS: 100 INJECTION, SOLUTION INTRAVENOUS; SUBCUTANEOUS at 14:07

## 2017-01-21 ASSESSMENT — VISUAL ACUITY
OU: NORMAL ACUITY

## 2017-01-21 NOTE — PROGRESS NOTES
"SPIRITUAL HEALTH SERVICES  Spiritual Assessment Progress Note  [facility]      PRIMARY FOCUS:      Support for coping    ILLNESS CIRCUMSTANCES:    Reviewed documentation. Reflective conversation shared with patient's wife which integrated elements of illness and family narratives.  Patient was in bed and asleep at the time of my initial visit.      Context of Serious Illness/Symptom(s) - Patient's wife states that the patient seems to have \"spells\" recently where he feels weak.  He has had a hx of a CVA in the past so she was concerned.  He has been in the ER a few times recently and also hospitalized recently.     Resources for Support -  Has adult children in [place].   .   DISTRESS:      Emotional, Existential/Relational Distress - None discussed. Patient's wife is hoping that they can find out what is going on that he has these episodes of weakness.     Spiritual/B[employer]ist Distress - None discussed     Social/Cultural/Economic Distress- None discussed        SPIRITUAL/Oriental orthodox COPING:      Zoroastrianism/Zari - Nondenominational    Spiritual Practice(s) - Patient converted to Catholicism  about 3 years ago.  They attend [facility] [employer].     Emotional, Relational, Existential Connections - Family       GOALS OF CARE:    Goals of Care - Not discussed     Meaning/Sense-Making - Not discussed       PLAN: will stop again and visit with patient. Referral made to [facility].    Vickie Sanchez  Chaplain Resident  Pager [phone]                               "

## 2017-01-21 NOTE — PLAN OF CARE
Problem: Goal Outcome Summary  Goal: Goal Outcome Summary  OT/CR: Order received for smoking cessation consult. Per chart, pt is a former smoker who quit in 1999.

## 2017-01-21 NOTE — PLAN OF CARE
Problem: Goal Outcome Summary  Goal: Goal Outcome Summary  PT/OT- cx eval , per chart review and discussion with nurse patient at baseline with mobility, all symptoms resolved of TIA , plan is to discharge home pending bubble study, no eval warranted .

## 2017-01-21 NOTE — PROGRESS NOTES
[facility]  [facility] [place]  Neurology Daily Note     10/11/11 8:35 AM        Assessment and Plan: 1.   Probable TIA  2. MRI negative for stroke  3. Was on Plavix- small dose ASA added  4. ECHO pending to r/o embolic source. If embolic source found would consider anticoagulation  5. Home if ECHO ok and cleared by therapies    Attestation:  This patient was seen and evaluated by me, Parker Watson, separately from the house staff team or resident(s).  I have reviewed the note below and agree.          Interval History:   No new issues           Review of Systems:   Feels back to baseline          Medications:     Current Facility-Administered Medications   Medication     atorvastatin (LIPITOR) tablet 80 mg     clopidogrel (PLAVIX) tablet 75 mg     tamsulosin (FLOMAX) capsule 0.4 mg     Medication Instruction     aspirin EC EC tablet 81 mg    Or     aspirin suspension 81 mg     labetalol (NORMODYNE/TRANDATE) injection 10-40 mg     naloxone (NARCAN) injection 0.1-0.4 mg     lidocaine 1 % 1 mL     lidocaine (LMX4) cream     sodium chloride (PF) 0.9% PF flush 3 mL     sodium chloride (PF) 0.9% PF flush 3 mL     0.9% sodium chloride infusion     acetaminophen (TYLENOL) Suppository 650 mg     acetaminophen (TYLENOL) tablet 650 mg     HYDROcodone-acetaminophen (NORCO) 5-325 MG per tablet 1-2 tablet     senna-docusate (SENOKOT-S;PERICOLACE) 8.6-50 MG per tablet 1-2 tablet     magnesium hydroxide (MILK OF MAGNESIA) suspension 30 mL     bisacodyl (DULCOLAX) Suppository 10 mg     prochlorperazine (COMPAZINE) injection 5 mg    Or     prochlorperazine (COMPAZINE) tablet 5 mg    Or     prochlorperazine (COMPAZINE) Suppository 12.5 mg     ondansetron (ZOFRAN-ODT) ODT tab 4 mg    Or     ondansetron (ZOFRAN) injection 4 mg     glucose 40 % gel 15-30 g    Or     dextrose 50 % injection 25-50 mL    Or     glucagon injection 1 mg     insulin aspart (NovoLOG) inj (RAPID ACTING)     insulin aspart (NovoLOG)  "inj (RAPID ACTING)             Physical Exam:   Vitals: Blood pressure 139/58, pulse 58, temperature 98.2  F (36.8  C), temperature source Oral, resp. rate 16, height 1.651 m (5' 5\"), weight 69 kg (152 lb 1.9 oz), SpO2 94 %.  Alert. Speech clear. Moves all ext well          Data:   ROUTINE IP LABS (Last 3results)  CBC RESULTS:     Recent Labs  Lab 01/21/17  0826 01/20/17  1030 01/16/17  1033   WBC 5.5 5.4 5.2   RBC 4.02* 4.34* 4.18*   HGB 12.3* 13.4 13.0*   HCT 37.2* 40.8 39.3*   * 139* 175     Basic Metabolic Panel:    Recent Labs  Lab 01/20/17  1030 01/16/17  1033    137   POTASSIUM 4.2 4.9   CHLORIDE 104 101   CO2 30 30   BUN 16 14   CR 0.73 0.72   * 337*   ALLISON 8.6 8.9     INR:  Recent Labs  Lab 01/20/17  1030   INR 1.03      Lipid Profile:  Recent Labs   Lab Test  01/21/17   0826  10/20/16   1825 12/18/15 12/05/14   CHOL  165  166  198  160   HDL  48  40  39*  48   LDL  87  80  123  82   TRIG  148  229*  181*  149   CHOLHDLRATIO   --    --   5.1*  3.3     TSH:  TSH   Date Value Ref Range Status   01/20/2017 3.12 0.40 - 4.00 mU/L Final   ,   Vitamin B12: B12      298   1/20/2017   A1C: A1C      7.9   12/26/2016                   "

## 2017-01-21 NOTE — PLAN OF CARE
Problem: Goal Outcome Summary  Goal: Goal Outcome Summary  Outcome: Improving  A&O x 4. Neuros intact. [place], not using hearing device. High SBP but still within given parameter. Tele SR with BBB. DD3 with nectar thick liquid/mod carb diet. Up with 1 with belt. Voiding using the bathroom. Bedtime blood sugar 255, sliding scale coverage given. Headache relieved with tylenol. Plan echo tomorrow.

## 2017-01-21 NOTE — PLAN OF CARE
Problem: Goal Outcome Summary  Goal: Goal Outcome Summary  Outcome: No Change  A/O, VSS.  Neuros intact .  Pt up with 1 and belt to bathroom, steady on feet.  Up freq to void.  Pt frustrated that he has had to be up so often.  Tele is SR-PACs.  IVF infusing as ordered.

## 2017-01-21 NOTE — DISCHARGE SUMMARY
[facility]    Discharge Summary  Hospitalist  Tra Reynoso MD    Date of Admission:  1/20/2017  Date of Discharge:  1/21/2017  Discharging Provider: Tra Reynoso  Date of Service (when I saw the patient): 01/21/2017    Discharge Diagnoses  1. Transient ischemic attack, resolved.    History of Present Illness  Dustin Pearce is an 88 year old male, who presented with transient dysarthria, right facial droop and ataxia.     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 Work up done on 1/20/17 revealed, normal BMP, negative troponin, TSH 3.12, Vit B12 298. CBC revealed, Hb 13.4, WBC 5.4 and Plts 139. UA was negative. CT Head on 1/20/17 revealed, chronic changes, no evidence of intracranial hemorrhage. 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 Severe stenosis left posterior cerebral artery P2 segment. There is moderate stenosis of the right internal carotid artery at its origin, 50% diameter narrowing.     He was admitted to the Neurology floor and was reviewed by the physical, occupational and speech therapist and the recommendation was for dysphagia type 3 diet and nectar thickened liquids. No additional skilled therapies were indicated.    He was reviewed by the Neurologist. MRI brain done on 1/20/17 revealed, diffuse cerebral volume loss and cerebral white matter  changes consistent with chronic small vessel ischemic disease. No evidence for acute intracranial pathology. ECHO done on 1/21/17 revealed, LV is normal in size. LV systolic function is moderately reduced. LVEF is 35-40%. RV systolic function is normal.  There is trace  mitral regurgitation.There is mild trileaflet aortic sclerosis. There is trivial aortic stenosis. The left atrium is mildly dilated. He remained clinically stable and was started on Aspirin 81mg qd and was discharged home. He is to continue on his other home medications.      Significant Results and Procedures  See above.    Pending Results  None.  Unresulted Labs Ordered in the Past 30 Days of this Admission     Date and Time Order Name Status Description    1/20/2017 1030 Vitamin D Deficiency In process           Code Status  Full Code       Primary Care Physician  Matt Morrissey    Physical Exam  Temp: 98.2  F (36.8  C) Temp src: Oral BP: 139/58 mmHg   Heart Rate: 74 Resp: 16 SpO2: 94 % O2 Device: None (Room air)    Filed Vitals:    01/20/17 1033   Weight: 69 kg (152 lb 1.9 oz)       Constitutional: Elderly white male, awake, cooperative, no apparent distress, O2 Sats 95% on RA  Respiratory: BS vesicular bilaterally, no crackles or wheezing  Cardiovascular: S1 and S2, reg, no murmur noted  GI: Soft, non-distended, non-tender, no masses, BS present+  Skin/Integumen: No rashes  Extremities: No pedal edema    Discharge Disposition  Discharged to home  Condition at discharge: Stable    Consultations This Hospital Stay  NEUROLOGY IP CONSULT  PHYSICAL THERAPY ADULT IP CONSULT  OCCUPATIONAL THERAPY ADULT IP CONSULT  SPEECH LANGUAGE PATH ADULT IP CONSULT  SWALLOW EVAL SPEECH PATH AT BEDSIDE IP CONSULT  SMOKING CESSATION PROGRAM IP CONSULT    Time Spent on This Encounter  I, Tra Reynoso, personally saw the patient today and spent less than or equal to 30 minutes discharging this patient.    Discharge Orders  No discharge procedures on file.  Discharge Medications  Current Discharge Medication List      CONTINUE these medications which have NOT CHANGED    Details   HYDROcodone-acetaminophen (NORCO) 5-325 MG per tablet Take 1-2 tablets by mouth every 4 hours as needed for moderate to severe pain     "  metoprolol (LOPRESSOR) 50 MG tablet Take 1 tablet (50 mg) by mouth 2 times daily  Qty: 60 tablet, Refills: 1    Associated Diagnoses: Essential hypertension, benign      omeprazole (PRILOSEC) 40 MG capsule Take 1 capsule (40 mg) by mouth daily Take 30-60 minutes before a meal.  Qty: 90 capsule, Refills: 0    Associated Diagnoses: Other acute gastritis without hemorrhage      glipiZIDE (GLIPIZIDE XL) 10 MG 24 hr tablet Take 1 tablet (10 mg) by mouth daily  Qty: 90 tablet, Refills: 3    Associated Diagnoses: Type 2 diabetes mellitus with diabetic peripheral angiopathy without gangrene, without long-term current use of insulin (H)      TAMSULOSIN HCL PO Take 0.4 mg by mouth daily       METFORMIN HCL PO Take 1,000 mg by mouth 2 times daily (with meals)      ATORVASTATIN CALCIUM PO Take 80 mg by mouth daily      Psyllium (METAMUCIL PO) Take 1 packet by mouth daily       VITAMIN D, CHOLECALCIFEROL, PO Take 1,000 Units by mouth daily       Clopidogrel Bisulfate, [id number], (PLAVIX PO) Take 75 mg by mouth daily     Associated Diagnoses: Cough      blood glucose monitoring (NO BRAND SPECIFIED) test strip Use to test blood sugar three times daily or as directed.  Qty: 300 each, Refills: 3    Comments: True Metrix strips or brand desired by insurance  Associated Diagnoses: Hypoglycemia      order for DME Equipment being ordered: True Matrix Blood Glucose meter.  Qty: 1 Device, Refills: 0    Associated Diagnoses: Type 2 diabetes mellitus without complication (H)           Allergies  Allergies   Allergen Reactions     Oxycodone Other (See Comments)     \"TERRIBLE SWEATING\"     Sulfa Drugs      Tetracycline      Data  Most Recent 3 CBC's:  Recent Labs   Lab Test  01/21/17   0826  01/20/17   1030  01/16/17   1033   WBC  5.5  5.4  5.2   HGB  12.3*  13.4  13.0*   MCV  93  94  94   PLT  139*  139*  175      Most Recent 3 BMP's:  Recent Labs   Lab Test  01/20/17   1030  01/16/17   1033  12/27/16   0650   NA  140  137  138 "   POTASSIUM  4.2  4.9  5.3   CHLORIDE  104  101  104   CO2  30  30  27   BUN  16  14  15   CR  0.73  0.72  0.72   ANIONGAP  6  6  7   ALLIOSN  8.6  8.9  8.4*   GLC  193*  337*  203*     Most Recent 2 LFT's:  Recent Labs   Lab Test  12/27/16   0650  12/25/16   0002   AST  43  19   ALT  50  21   ALKPHOS  90  58   BILITOTAL  0.7  0.6     Most Recent INR's and Anticoagulation Dosing History:        Anticoagulation Dose History     Recent Dosing and Labs Latest Ref Rng 8/19/2009 1/20/2017    INR 0.86 - 1.14 0.99 1.03        Most Recent 3 Troponin's:  Recent Labs   Lab Test  01/20/17   2235  01/20/17   1030  12/25/16   0002   TROPI  0.030  0.018  0.022     Most Recent Cholesterol Panel:  Recent Labs   Lab Test  01/21/17   0826   CHOL  165   LDL  87   HDL  48   TRIG  148     Most Recent 6 Bacteria Isolates From Any Culture (See EPIC Reports for Culture Details):No lab results found.  Most Recent TSH, T4 and A1c Labs:  Recent Labs   Lab Test  01/20/17   1030  12/26/16   0643  10/20/16   1825   TSH  3.12   --   4.63*   T4   --    --   0.94   A1C   --   7.9*  8.5*       Results for orders placed or performed during the hospital encounter of 01/20/17   CTA Angiogram Head Neck    Narrative    CT ANGIOGRAM OF THE HEAD AND NECK WITHOUT AND WITH CONTRAST  1/20/2017  10:58 AM     HISTORY: Right-sided weakness and slurred speech that have resolved.  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    TECHNIQUE:  Precontrast localizing scans were followed by CT  angiography with an injection of 70 mL Isovue-370 IV with scans  through the head and neck.  Images were transferred to a separate 3-D  workstation where multiplanar reformations and 3-D images were  created.  Estimates of carotid stenoses are made relative to the  distal internal carotid artery diameters except as noted. Radiation  dose for this scan was reduced using automated exposure control,  adjustment of the mA and/or kV according to patient size, or  iterative  reconstruction technique.      COMPARISON: MRI 8/20/2009.    CT HEAD FINDINGS:  No contrast enhancing lesions.  Cerebral blood flow  is grossly normal.     CT ANGIOGRAM HEAD FINDINGS:  Calcified plaque in the carotid siphons.  Left carotid siphon is completely occluded. Collateral flow to the  left middle cerebral artery is via patent anterior communicating  artery. No evidence of aneurysm or intracranial branch artery  occlusion. Severe stenosis of the left posterior cerebral artery P2  segment. This is new since previous exam. Venous circulation is  unremarkable.     CT ANGIOGRAM NECK FINDINGS:   Right carotid artery:  Calcified and noncalcified plaque in the  carotid bifurcation. Residual luminal diameter is 2.4 mm compared with  distal internal carotid diameter of 5.5 mm, indicating 57% diameter  stenosis by NASCET criteria.      Left carotid artery: Extensive calcified and noncalcified plaque in  the distal common carotid artery and carotid bifurcation.  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      Vertebral arteries:  No significant stenosis.      Other findings: None.      Impression    IMPRESSION:   1. 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 No change.  2. Severe stenosis left posterior cerebral artery P2 segment, new  since 2009.  3. There is moderate stenosis of the right internal carotid artery at  its origin, 50% diameter narrowing by NASCET criteria.    I agree with the preliminary report that was communicated to the  referring physician.    LEATHA MENJIVAR MD   CT Head w Contrast    Narrative    CT HEAD WITH CONTRAST 1/20/2017 11:26 AM     HISTORY: Stroke.    TECHNIQUE: Axial images were obtained through the brain with contrast  for CT brain perfusion imaging. 50 mL Isovue 370 was given. Radiation  dose for this scan was reduced using automated exposure control,  adjustment of the mA and/or kV according to patient size, or  iterative  reconstruction technique.    FINDINGS: There is a moderate-sized old infarct in the left frontal  lobe. There are some areas of mildly altered perfusion in the left  hemispheric watershed areas in the region of the known left frontal  infarct and also in the left parietal region. This patient does have a  known left internal carotid artery occlusion which could explain these  findings. There is no convincing evidence for ischemia.      Impression    IMPRESSION:  1. 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  2. Altered left hemispheric perfusion presumably due to known occluded  internal carotid artery on a chronic basis.  3. No evidence for any ischemia.    RACHELLE STANFORD MD   CT Head w/o Contrast    Narrative    CT SCAN OF THE HEAD WITHOUT CONTRAST   1/20/2017 10:45 AM     HISTORY: Stroke.    TECHNIQUE:  Axial images of the head and coronal reformations without  IV contrast material.  Radiation dose for this scan was reduced using  automated exposure control, adjustment of the mA and/or kV according  to patient size, or iterative reconstruction technique.    COMPARISON: 8/19/2009.    FINDINGS: There is an old left frontal infarct. Mild-to-moderate  cerebral atrophy is present. Patchy white matter changes are seen in  both hemispheres without mass effect. There is no evidence for  intracranial hemorrhage, acute infarct, mass effect, or skull  fracture.      Impression    IMPRESSION: Chronic changes. No evidence for intracranial hemorrhage  or any acute process.    RACHELLE STANFORD MD   MRI Brain w & w/o contrast    Narrative    MRI OF THE BRAIN WITHOUT AND WITH CONTRAST  1/20/2017  6:11 PM     COMPARISON: Head CT same day.    HISTORY: Acute CVI     TECHNIQUE: Axial diffusion-weighted with ADC map, axial T2-weighted  with fat saturation, axial T1-weighted, axial turboFLAIR and coronal  T1-weighted images of the brain were acquired without intravenous  contrast. Following intravenous administration of gadolinium (7mL  Gadavist),  axial T1-weighted images of the brain were acquired.     FINDINGS: There is moderate diffuse cerebral volume loss. There are  numerous scattered focal and patchy periventricular areas of abnormal  T2 signal hyperintensity in the cerebral white matter bilaterally that  are consistent with sequela of chronic small vessel ischemic disease.  A chronic left frontal infarct is again noted.    The ventricles and basal cisterns are within normal limits in  configuration given the degree of cerebral volume loss. There is no  midline shift. There are no extra-axial fluid collections. There is no  evidence for stroke or acute intracranial hemorrhage. There is no  abnormal contrast enhancement in the brain or its coverings.     There is no sinusitis or mastoiditis.      Impression    IMPRESSION: Diffuse cerebral volume loss and cerebral white matter  changes consistent with chronic small vessel ischemic disease. No  evidence for acute intracranial pathology.    LISANDRO MAGALLON MD

## 2017-01-21 NOTE — PLAN OF CARE
Problem: Goal Outcome Summary  Goal: Goal Outcome Summary  SLP:  Improved tolerance of thins and improved ZANI. No overt s/sx of aspiration noted with thins by cup.  Pt to advance to thins and continue dysphagia diet level 3.  Education provided re: slow and small bites/sips. No straws; exercises recommended and written handout given to pt.

## 2017-01-22 NOTE — PROGRESS NOTES
Alert and oriented x4. VSS. Denies pain. Up SBA. Neuros intact. MRI and CT negative, Echo done today waiting on results okay to go once results are back and clear. Will continue to monitor.

## 2017-01-22 NOTE — PLAN OF CARE
Problem: Goal Outcome Summary  Goal: Goal Outcome Summary  Outcome: Adequate for Discharge Date Met:  01/22/17  Speech Language Therapy Discharge Summary    Reason for therapy discharge:    Discharged to home.    Progress towards therapy goal(s). See goals on Care Plan in [facility] electronic health record for goal details.  Goals met    Therapy recommendation(s):    No further therapy is recommended. Rec: slow and small bites/sips. No straws; home maintenance exercises recommended and written handout given to pt.

## 2017-01-23 LAB — DEPRECATED CALCIDIOL+CALCIFEROL SERPL-MC: 19 UG/L (ref 20–75)

## 2017-01-24 ENCOUNTER — TELEPHONE (OUTPATIENT)
Dept: FAMILY MEDICINE | Facility: CLINIC | Age: 82
End: 2017-01-24

## 2017-01-24 NOTE — TELEPHONE ENCOUNTER
[facility]  Discharge Summary  Hospitalist  Tra Reynoso MD    Date of Admission:  1/20/2017  Date of Discharge:  1/21/2017  Discharging Provider: Tra Reynoso  Date of Service (when I saw the patient): 01/21/2017    Patient started on lisinopril 2.5 mg daily and aspirin 81 mg daily at d/c, patient is aware and has picked up.

## 2017-01-24 NOTE — TELEPHONE ENCOUNTER
Hospital f/u 1/21/17 Dx Left Carotid Artery Occlusion ED/IP 1/1  [phone] (home) [phone] (work)

## 2017-01-24 NOTE — TELEPHONE ENCOUNTER
"Hospital/TCU/ED for chronic condition Discharge Protocol    \"Hi, my name is Sena Katz, a registered nurse, and I am calling from [facility].  I am calling to follow up and see how things are going for you after your recent emergency visit/hospital/TCU stay.\"    Tell me how you are doing now that you are home?\" Patient reports feeling well, denies any symptoms at this time. Patient aware of appointment with PCP this week and other follow ups noted in chart. Patient has started taking 2 new medications, has no questions on those. Reviewed SE to watch for and call with any questions.      Discharge Instructions    \"Let's review your discharge instructions.  What is/are the follow-up recommendations?  Pt. Response: PCP appointment, Cardiology appt also scheduled.    \"Has an appointment with your primary care provider been scheduled?\"   Yes. (confirm)    \"When you see the provider, I would recommend that you bring your medications with you.\"    Medications    \"Tell me what changed about your medicines when you discharged?\"    Changes to chronic meds?    0-1    \"What questions do you have about your medications?\"    None     New diagnoses of heart failure, COPD, diabetes, or MI?    No              Medication reconciliation completed? Yes  Was MTM referral placed (*Make sure to put transitions as reason for referral)?   No    Call Summary    \"What questions or concerns do you have about your recent visit and your follow-up care?\"     none    \"If you have questions or things don't continue to improve, we encourage you contact us through the main clinic number (give number).  Even if the clinic is not open, triage nurses are available 24/7 to help you.     We would like you to know that our clinic has extended hours (provide information).  We also have urgent care (provide details on closest location and hours/contact info)\"      \"Thank you for your time and take care!\"        Sena Katz RN         "

## 2017-01-27 ENCOUNTER — OFFICE VISIT (OUTPATIENT)
Dept: FAMILY MEDICINE | Facility: CLINIC | Age: 82
End: 2017-01-27
Payer: MEDICARE

## 2017-01-27 VITALS
HEIGHT: 65 IN | BODY MASS INDEX: 26.61 KG/M2 | WEIGHT: 159.7 LBS | DIASTOLIC BLOOD PRESSURE: 59 MMHG | TEMPERATURE: 97.5 F | OXYGEN SATURATION: 97 % | HEART RATE: 60 BPM | SYSTOLIC BLOOD PRESSURE: 138 MMHG

## 2017-01-27 DIAGNOSIS — E11.51 TYPE 2 DIABETES MELLITUS WITH DIABETIC PERIPHERAL ANGIOPATHY WITHOUT GANGRENE, WITHOUT LONG-TERM CURRENT USE OF INSULIN (H): ICD-10-CM

## 2017-01-27 DIAGNOSIS — K29.00 OTHER ACUTE GASTRITIS WITHOUT HEMORRHAGE: ICD-10-CM

## 2017-01-27 DIAGNOSIS — F32.4 MAJOR DEPRESSIVE DISORDER WITH SINGLE EPISODE, IN PARTIAL REMISSION (H): ICD-10-CM

## 2017-01-27 DIAGNOSIS — I25.10 CORONARY ARTERY DISEASE INVOLVING NATIVE CORONARY ARTERY OF NATIVE HEART WITHOUT ANGINA PECTORIS: ICD-10-CM

## 2017-01-27 DIAGNOSIS — N40.1 BENIGN NON-NODULAR PROSTATIC HYPERPLASIA WITH LOWER URINARY TRACT SYMPTOMS: ICD-10-CM

## 2017-01-27 DIAGNOSIS — I10 ESSENTIAL HYPERTENSION, BENIGN: ICD-10-CM

## 2017-01-27 DIAGNOSIS — G45.9 TRANSIENT CEREBRAL ISCHEMIA, UNSPECIFIED TYPE: Primary | ICD-10-CM

## 2017-01-27 DIAGNOSIS — I51.9 LEFT VENTRICULAR SYSTOLIC DYSFUNCTION: ICD-10-CM

## 2017-01-27 PROCEDURE — 99496 TRANSJ CARE MGMT HIGH F2F 7D: CPT | Performed by: INTERNAL MEDICINE

## 2017-01-27 RX ORDER — DULOXETIN HYDROCHLORIDE 30 MG/1
30 CAPSULE, DELAYED RELEASE ORAL DAILY
COMMUNITY
End: 2017-04-21

## 2017-01-27 RX ORDER — METOPROLOL TARTRATE 50 MG
50 TABLET ORAL 2 TIMES DAILY
Qty: 180 TABLET | Refills: 3 | Status: SHIPPED | OUTPATIENT
Start: 2017-01-27 | End: 2017-02-24

## 2017-01-27 RX ORDER — TAMSULOSIN HYDROCHLORIDE 0.4 MG/1
0.4 CAPSULE ORAL DAILY
Qty: 90 CAPSULE | Refills: 3 | Status: SHIPPED | OUTPATIENT
Start: 2017-01-27 | End: 2018-11-23

## 2017-01-27 RX ORDER — LISINOPRIL 2.5 MG/1
2.5 TABLET ORAL DAILY
Qty: 90 TABLET | Refills: 0 | Status: SHIPPED | OUTPATIENT
Start: 2017-01-27 | End: 2017-05-20

## 2017-01-27 RX ORDER — OMEPRAZOLE 40 MG/1
40 CAPSULE, DELAYED RELEASE ORAL DAILY
Qty: 90 CAPSULE | Refills: 3 | Status: ON HOLD | OUTPATIENT
Start: 2017-01-27 | End: 2018-02-08

## 2017-01-27 NOTE — PROGRESS NOTES
SUBJECTIVE:                                                    Dustin Pearce is a 88 year old male who presents to clinic today for the following health issues:          Hospital Follow-up Visit:    Hospital/Nursing Home/IP Rehab Facility: [facility]  Date of Admission: 1-  Date of Discharge: 1-  Reason(s) for Admission: TIA              Problems taking medications regularly:  None       Medication changes since discharge: None       Problems adhering to non-medication therapy:  None    Summary of hospitalization:  [facility] [place] discharge summary reviewed  Diagnostic Tests/Treatments reviewed.  Follow up needed: none  Other Healthcare Providers Involved in Patient s Care:         None  Update since discharge: improved. No recurrent facial droop or dysarthria     Post Discharge Medication Reconciliation: discharge medications reconciled and changed, per note/orders (see AVS).  Plan of care communicated with patient and family wife     Coding guidelines for this visit:  Type of Medical   Decision Making Face-to-Face Visit       within 7 Days of discharge Face-to-Face Visit        within 14 days of discharge   Moderate Complexity [id number]   High Complexity [id number]              Diabetes Follow-up      Patient is checking blood sugars: rarely.  Results range from 162 AM fasting     Diabetic concerns: None     Symptoms of hypoglycemia (low blood sugar): none     Paresthesias (numbness or burning in feet) or sores: Yes ; he is seeing podiatry for diabetic foot ulcer      Date of last diabetic eye exam: Several days ago      Hyperlipidemia Follow-Up      Rate your low fat/cholesterol diet?: good    Taking statin?  Yes, no muscle aches from statin    Other lipid medications/supplements?:  none     Hypertension Follow-up      Outpatient blood pressures are not being checked.    Low Salt Diet: no added salt       Depression Followup    Status since last visit: Improved on cymbalta      See PHQ-9 for current symptoms.  Other associated symptoms: None    Complicating factors:   Significant life event:  No   Current substance abuse:  None  Anxiety or Panic symptoms:  No    PHQ-9  English PHQ-9   Any Language          PHQ-9 SCORE 1/6/2017 1/27/2017   Total Score 17 4     Problem list and histories reviewed & adjusted, as indicated.  Additional history: as documented    Patient Active Problem List   Diagnosis     Coronary artery disease involving native coronary artery of native heart without angina pectoris     Type 2 diabetes mellitus with diabetic peripheral angiopathy without gangrene, without long-term current use of insulin (H)     Mild cognitive impairment     Hyperlipidemia LDL goal <70     Nephrolithiasis     Benign non-nodular prostatic hyperplasia with lower urinary tract symptoms     Gastroesophageal reflux disease without esophagitis     Cerebrovascular accident (H)     Carotid artery stenosis     Abdominal aortic aneurysm (H)     Acute pancreatitis     Lumbar back pain     Acute right-sided low back pain without sciatica     Benign essential hypertension     TIA (transient ischemic attack)     Past Surgical History   Procedure Laterality Date     Cholecystectomy       Hernia repair       Genitourinary surgery       KIDNEY STONE REMOVAL X 4     Laser holmium lithotripsy ureter(s), insert stent, combined  3/2/2012     Procedure:COMBINED CYSTOSCOPY, URETEROSCOPY, LASER HOLMIUM LITHOTRIPSY URETER(S), INSERT STENT; CYSTOSCOPY, RIGHT RETROGRADES, RIGHT URETEROSCOPY, HOLMIUM LASER, RIGHT STENT PLACEMENT; Surgeon:ANJELICA LEÓN; Location: OR     Rotator cuff repair rt/lt       Esophagoscopy, gastroscopy, duodenoscopy (egd), combined N/A 12/26/2016     Procedure: COMBINED ESOPHAGOSCOPY, GASTROSCOPY, DUODENOSCOPY (EGD);  Surgeon: Nasim Louis MD;  Location:  GI     Hc ugi endoscopy w eus N/A 12/29/2016     Procedure: COMBINED ENDOSCOPIC ULTRASOUND, ESOPHAGOSCOPY,  GASTROSCOPY, DUODENOSCOPY (EGD);  Surgeon: Nasim Louis MD;  Location:  GI       Social History   Substance Use Topics     Smoking status: Former Smoker -- 1.00 packs/day for 30 years     Types: Cigarettes     Quit date: 01/01/1999     Smokeless tobacco: Never Used     Alcohol Use: 4.2 oz/week     7 Standard drinks or equivalent per week      Comment: 1 drink per week     Family History   Problem Relation Age of Onset     Heart Failure Father 81     Breast Cancer Mother          Current Outpatient Prescriptions   Medication Sig Dispense Refill     lisinopril (PRINIVIL/ZESTRIL) 2.5 MG tablet Take 1 tablet (2.5 mg) by mouth daily 90 tablet 0     metoprolol (LOPRESSOR) 50 MG tablet Take 1 tablet (50 mg) by mouth 2 times daily 180 tablet 3     omeprazole (PRILOSEC) 40 MG capsule Take 1 capsule (40 mg) by mouth daily Take 30-60 minutes before a meal. 90 capsule 3     DULoxetine (CYMBALTA) 30 MG EC capsule Take 30 mg by mouth daily       aspirin EC 81 MG EC tablet Take 1 tablet (81 mg) by mouth daily 30 tablet 0     HYDROcodone-acetaminophen (NORCO) 5-325 MG per tablet Take 1-2 tablets by mouth every 4 hours as needed for moderate to severe pain       glipiZIDE (GLIPIZIDE XL) 10 MG 24 hr tablet Take 1 tablet (10 mg) by mouth daily 90 tablet 3     TAMSULOSIN HCL PO Take 0.4 mg by mouth daily        METFORMIN HCL PO Take 1,000 mg by mouth 2 times daily (with meals)       ATORVASTATIN CALCIUM PO Take 80 mg by mouth daily       blood glucose monitoring (NO BRAND SPECIFIED) test strip Use to test blood sugar three times daily or as directed. 300 each 3     order for DME Equipment being ordered: True Matrix Blood Glucose meter. 1 Device 0     Psyllium (METAMUCIL PO) Take 1 packet by mouth daily        VITAMIN D, CHOLECALCIFEROL, PO Take 1,000 Units by mouth daily        Clopidogrel Bisulfate, [id number], (PLAVIX PO) Take 75 mg by mouth daily        [DISCONTINUED] lisinopril (PRINIVIL/ZESTRIL) 2.5 MG tablet Take 1  "tablet (2.5 mg) by mouth daily 30 tablet 0     [DISCONTINUED] metoprolol (LOPRESSOR) 50 MG tablet Take 1 tablet (50 mg) by mouth 2 times daily 60 tablet 1     Allergies   Allergen Reactions     Oxycodone Other (See Comments)     \"TERRIBLE SWEATING\"     Sulfa Drugs      Tetracycline        ROS:  C: NEGATIVE for fever, chills, change in weight  E/M: NEGATIVE for ear, mouth and throat problems  R: NEGATIVE for significant cough or SOB  CV: NEGATIVE for chest pain, palpitations or peripheral edema    OBJECTIVE:                                                    /59 mmHg  Pulse 60  Temp(Src) 97.5  F (36.4  C) (Oral)  Ht 5' 5\" (1.651 m)  Wt 159 lb 11.2 oz (72.439 kg)  BMI 26.58 kg/m2  SpO2 97%  Body mass index is 26.58 kg/(m^2).  GENERAL: healthy, alert and no distress  NEURO:   PSYCH: mentation appears normal, affect normal/bright    Diagnostic Test Results:  Results for orders placed or performed during the hospital encounter of 01/20/17   CTA Angiogram Head Neck    Narrative    CT ANGIOGRAM OF THE HEAD AND NECK WITHOUT AND WITH CONTRAST  1/20/2017  10:58 AM     HISTORY: Right-sided weakness and slurred speech that have resolved.  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    TECHNIQUE:  Precontrast localizing scans were followed by CT  angiography with an injection of 70 mL Isovue-370 IV with scans  through the head and neck.  Images were transferred to a separate 3-D  workstation where multiplanar reformations and 3-D images were  created.  Estimates of carotid stenoses are made relative to the  distal internal carotid artery diameters except as noted. Radiation  dose for this scan was reduced using automated exposure control,  adjustment of the mA and/or kV according to patient size, or iterative  reconstruction technique.      COMPARISON: MRI 8/20/2009.    CT HEAD FINDINGS:  No contrast enhancing lesions.  Cerebral blood flow  is grossly normal.     CT ANGIOGRAM HEAD FINDINGS:  Calcified plaque " in the carotid siphons.  Left carotid siphon is completely occluded. Collateral flow to the  left middle cerebral artery is via patent anterior communicating  artery. No evidence of aneurysm or intracranial branch artery  occlusion. Severe stenosis of the left posterior cerebral artery P2  segment. This is new since previous exam. Venous circulation is  unremarkable.     CT ANGIOGRAM NECK FINDINGS:   Right carotid artery:  Calcified and noncalcified plaque in the  carotid bifurcation. Residual luminal diameter is 2.4 mm compared with  distal internal carotid diameter of 5.5 mm, indicating 57% diameter  stenosis by NASCET criteria.      Left carotid artery: Extensive calcified and noncalcified plaque in  the distal common carotid artery and carotid bifurcation.  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      Vertebral arteries:  No significant stenosis.      Other findings: None.      Impression    IMPRESSION:   1. 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 No change.  2. Severe stenosis left posterior cerebral artery P2 segment, new  since 2009.  3. There is moderate stenosis of the right internal carotid artery at  its origin, 50% diameter narrowing by NASCET criteria.    I agree with the preliminary report that was communicated to the  referring physician.    LEATHA MENJIVAR MD   CT Head w Contrast    Narrative    CT HEAD WITH CONTRAST 1/20/2017 11:26 AM     HISTORY: Stroke.    TECHNIQUE: Axial images were obtained through the brain with contrast  for CT brain perfusion imaging. 50 mL Isovue 370 was given. Radiation  dose for this scan was reduced using automated exposure control,  adjustment of the mA and/or kV according to patient size, or iterative  reconstruction technique.    FINDINGS: There is a moderate-sized old infarct in the left frontal  lobe. There are some areas of mildly altered perfusion in the left  hemispheric watershed areas in the  region of the known left frontal  infarct and also in the left parietal region. This patient does have a  known left internal carotid artery occlusion which could explain these  findings. There is no convincing evidence for ischemia.      Impression    IMPRESSION:  1. 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  2. Altered left hemispheric perfusion presumably due to known occluded  internal carotid artery on a chronic basis.  3. No evidence for any ischemia.    RACHELLE STANFORD MD   CT Head w/o Contrast    Narrative    CT SCAN OF THE HEAD WITHOUT CONTRAST   1/20/2017 10:45 AM     HISTORY: Stroke.    TECHNIQUE:  Axial images of the head and coronal reformations without  IV contrast material.  Radiation dose for this scan was reduced using  automated exposure control, adjustment of the mA and/or kV according  to patient size, or iterative reconstruction technique.    COMPARISON: 8/19/2009.    FINDINGS: There is an old left frontal infarct. Mild-to-moderate  cerebral atrophy is present. Patchy white matter changes are seen in  both hemispheres without mass effect. There is no evidence for  intracranial hemorrhage, acute infarct, mass effect, or skull  fracture.      Impression    IMPRESSION: Chronic changes. No evidence for intracranial hemorrhage  or any acute process.    RACHELLE STANFORD MD   MRI Brain w & w/o contrast    Narrative    MRI OF THE BRAIN WITHOUT AND WITH CONTRAST  1/20/2017  6:11 PM     COMPARISON: Head CT same day.    HISTORY: Acute CVI     TECHNIQUE: Axial diffusion-weighted with ADC map, axial T2-weighted  with fat saturation, axial T1-weighted, axial turboFLAIR and coronal  T1-weighted images of the brain were acquired without intravenous  contrast. Following intravenous administration of gadolinium (7mL  Gadavist), axial T1-weighted images of the brain were acquired.     FINDINGS: There is moderate diffuse cerebral volume loss. There are  numerous scattered focal and patchy periventricular areas of abnormal  T2 signal  hyperintensity in the cerebral white matter bilaterally that  are consistent with sequela of chronic small vessel ischemic disease.  A chronic left frontal infarct is again noted.    The ventricles and basal cisterns are within normal limits in  configuration given the degree of cerebral volume loss. There is no  midline shift. There are no extra-axial fluid collections. There is no  evidence for stroke or acute intracranial hemorrhage. There is no  abnormal contrast enhancement in the brain or its coverings.     There is no sinusitis or mastoiditis.      Impression    IMPRESSION: Diffuse cerebral volume loss and cerebral white matter  changes consistent with chronic small vessel ischemic disease. No  evidence for acute intracranial pathology.    LISANDRO MAGALLON MD   Basic metabolic panel   Result Value Ref Range    Sodium 140 133 - 144 mmol/L    Potassium 4.2 3.4 - 5.3 mmol/L    Chloride 104 94 - 109 mmol/L    Carbon Dioxide 30 20 - 32 mmol/L    Anion Gap 6 3 - 14 mmol/L    Glucose 193 (H) 70 - 99 mg/dL    Urea Nitrogen 16 7 - 30 mg/dL    Creatinine 0.73 0.66 - 1.25 mg/dL    GFR Estimate >90  Non  GFR Calc   >60 mL/min/1.7m2    GFR Estimate If Black >90   GFR Calc   >60 mL/min/1.7m2    Calcium 8.6 8.5 - 10.1 mg/dL   CBC with platelets differential   Result Value Ref Range    WBC 5.4 4.0 - 11.0 10e9/L    RBC Count 4.34 (L) 4.4 - 5.9 10e12/L    Hemoglobin 13.4 13.3 - 17.7 g/dL    Hematocrit 40.8 40.0 - 53.0 %    MCV 94 78 - 100 fl    MCH 30.9 26.5 - 33.0 pg    MCHC 32.8 31.5 - 36.5 g/dL    RDW 13.0 10.0 - 15.0 %    Platelet Count 139 (L) 150 - 450 10e9/L    Diff Method Automated Method     % Neutrophils 48.4 %    % Lymphocytes 38.3 %    % Monocytes 10.5 %    % Eosinophils 2.2 %    % Basophils 0.4 %    % Immature Granulocytes 0.2 %    Nucleated RBCs 0 0 /100    Absolute Neutrophil 2.6 1.6 - 8.3 10e9/L    Absolute Lymphocytes 2.1 0.8 - 5.3 10e9/L    Absolute Monocytes 0.6 0.0 - 1.3 10e9/L     Absolute Eosinophils 0.1 0.0 - 0.7 10e9/L    Absolute Basophils 0.0 0.0 - 0.2 10e9/L    Abs Immature Granulocytes 0.0 0 - 0.4 10e9/L    Absolute Nucleated RBC 0.0    INR   Result Value Ref Range    INR 1.03 0.86 - 1.14   Partial thromboplastin time   Result Value Ref Range    PTT 29 22 - 37 sec   Troponin I   Result Value Ref Range    Troponin I ES 0.018 0.000 - 0.045 ug/L   UA with Microscopic   Result Value Ref Range    Color Urine Light Yellow     Appearance Urine Clear     Glucose Urine Negative NEG mg/dL    Bilirubin Urine Negative NEG    Ketones Urine Negative NEG mg/dL    Specific Gravity Urine 1.040 (H) 1.003 - 1.035    Blood Urine Negative NEG    pH Urine 6.5 5.0 - 7.0 pH    Protein Albumin Urine Negative NEG mg/dL    Urobilinogen mg/dL Normal 0.0 - 2.0 mg/dL    Nitrite Urine Negative NEG    Leukocyte Esterase Urine Negative NEG    Source Midstream Urine     WBC Urine 1 0 - 2 /HPF    RBC Urine 0 0 - 2 /HPF    Squamous Epithelial /HPF Urine <1 0 - 1 /HPF    Mucous Urine Present (A) NEG /LPF   Vitamin D Deficiency   Result Value Ref Range    Vitamin D Deficiency screening 19 (L) 20 - 75 ug/L   CRP inflammation   Result Value Ref Range    CRP Inflammation <2.9 0.0 - 8.0 mg/L   TSH with free T4 reflex   Result Value Ref Range    TSH 3.12 0.40 - 4.00 mU/L   Vitamin B12   Result Value Ref Range    Vitamin B12 298 193 - 986 pg/mL   Troponin I   Result Value Ref Range    Troponin I ES 0.030 0.000 - 0.045 ug/L   Glucose by meter   Result Value Ref Range    Glucose 161 (H) 70 - 99 mg/dL   Glucose by meter   Result Value Ref Range    Glucose 255 (H) 70 - 99 mg/dL   Lipid panel reflex to direct LDL   Result Value Ref Range    Cholesterol 165 <200 mg/dL    Triglycerides 148 <150 mg/dL    HDL Cholesterol 48 >39 mg/dL    LDL Cholesterol Calculated 87 <100 mg/dL    Non HDL Cholesterol 117 <130 mg/dL   CBC with platelets   Result Value Ref Range    WBC 5.5 4.0 - 11.0 10e9/L    RBC Count 4.02 (L) 4.4 - 5.9 10e12/L     Hemoglobin 12.3 (L) 13.3 - 17.7 g/dL    Hematocrit 37.2 (L) 40.0 - 53.0 %    MCV 93 78 - 100 fl    MCH 30.6 26.5 - 33.0 pg    MCHC 33.1 31.5 - 36.5 g/dL    RDW 12.9 10.0 - 15.0 %    Platelet Count 139 (L) 150 - 450 10e9/L   Glucose by meter   Result Value Ref Range    Glucose 122 (H) 70 - 99 mg/dL   Glucose by meter   Result Value Ref Range    Glucose 163 (H) 70 - 99 mg/dL   Glucose by meter   Result Value Ref Range    Glucose 173 (H) 70 - 99 mg/dL   Glucose by meter   Result Value Ref Range    Glucose 138 (H) 70 - 99 mg/dL   EKG 12-lead, tracing only   Result Value Ref Range    Interpretation ECG Click View Image link to view waveform and result    Echocardiogram - bubble study    Narrative             [facility]  Echocardiography Laboratory  [address] [place] [address]        Name: SID AGUIAR  MRN: [id number]  : 1928  Study Date: 2017 02:11 PM  Age: 88 yrs  Gender: Male  Reason For Study: CVA  Referring Physician: JACK HERNANDEZ  Performed By: Joy Aponte RDCS     BSA: 1.8 m2  Height: 65 in  Weight: 152 lb  HR: 97  BP: 181/84 mmHg  _____________________________________________________________________________  __        Procedure  Complete Portable Bubble Echo Adult.  _____________________________________________________________________________  __        Interpretation Summary     The rhythm was atrial fibrillation with controlled ventricular rate at rest  with an IVCD also.  The left ventricle is normal in size. Left ventricular systolic function is  moderately reduced as the visual ejection fraction is estimated at 35-40%.  The right ventricular systolic function is normal.  The aortic Sinus(es) of Valsalva are mildly dilated at 4.1 cm. with a normal  size ascending aorta.  There is trace mitral regurgitation.  There is mild trileaflet aortic sclerosis.  There is sclerosis, calcification, and restriction of the aortic valve opening  compatible with trivial  aortic stenosis.  The left atrium is mildly dilated by volume criteria at 39 ml/m2.  _____________________________________________________________________________  __        Left Ventricle  The left ventricle is normal in size. Proximal septal thickening is noted.  Echo findings are not consistent with left ventricular outflow obstruction.  Left ventricular systolic function is moderately reduced. The visual ejection  fraction is estimated at 35-40%. There is severe inferolateral wall  hypokinesis. There is no thrombus seen in the left ventricle.     Right Ventricle  Normal right ventricle structure and size. The right ventricular systolic  function is normal.     Atria  The left atrium is mildly dilated. Left atrial enlargement is noted by volume  criteria. at 39 ml/m2. Intact atrial septum.        Mitral Valve  There is mild to moderate mitral annular calcification. There is no evidence  of mitral valve prolapse. There is trace mitral regurgitation. There is no  mitral valve stenosis.     Tricuspid Valve  The tricuspid valve is normal in structure and function. There is trace  tricuspid regurgitation. Right ventricular systolic pressure could not be  approximated due to inadequate tricuspid regurgitation.     Aortic Valve  There is mild trileaflet aortic sclerosis. No aortic regurgitation is present.  The mean AoV pressure gradient is 11.5 mmHg. The peak AoV pressure gradient is  23.2 mmHg. The calculated aortic valve are is 2.4 cm^2. There is sclerosis,  calcification, and restriction of the aortic valve opening compatible with  trivial aortic stenosis.     Pulmonic Valve  The pulmonic valve is not well seen, but is grossly normal. There is trace  pulmonic valvular regurgitation.     Vessels  The aortic Sinus(es) of Valsalva are mildly dilated. at 4.1 cm. Normal size  ascending aorta. The inferior vena cava is not dilated.     Pericardium  The pericardium appears normal. There is no pleural effusion.         Rhythm  The rhythm was atrial fibrillation with controlled ventricular rate at rest.  With an IVCD also.  _____________________________________________________________________________  __     MMode/2D Measurements & Calculations  IVSd: 1.4 cm  LVIDd: 5.5 cm  LVIDs: 4.9 cm  LVPWd: 1.0 cm  FS: 10.1 %  EDV(Teich): 146.3 ml  ESV(Teich): 114.2 ml  LV mass(C)d: 278.7 grams  Ao root diam: 4.1 cm  LA dimension: 4.0 cm  LA/Ao: 0.97  LVOT diam: 2.4 cm  LVOT area: 4.6 cm2  LA Volume (BP): 69.7 ml  LA Volume Index (BP): 39.6 ml/m2           Doppler Measurements & Calculations  MV E max donnie: 77.1 cm/sec  MV A max donnie: 172.4 cm/sec  MV E/A: 0.45  MV max P.2 mmHg  MV mean P.9 mmHg  MV V2 VTI: 35.6 cm  MVA(VTI): 3.3 cm2  MV dec time: 0.28 sec  Ao V2 max: 240.2 cm/sec  Ao max P.2 mmHg  Ao V2 mean: 157.8 cm/sec  Ao mean P.5 mmHg  Ao V2 VTI: 48.7 cm  YULIYA(I,D): 2.4 cm2  YULIYA(V,D): 2.2 cm2  LV V1 max P.6 mmHg  LV V1 max: 116.6 cm/sec  LV V1 VTI: 25.3 cm  SV(LVOT): 116.7 ml  PA V2 max: 101.0 cm/sec  PA max P.1 mmHg  Pulm Sys Donnie: 77.6 cm/sec  Pulm Delgado Donnie: 38.3 cm/sec  Pulm A Revs Donnie: 45.6 cm/sec  Pulm S/D: 2.0  YULIYA Index (cm2/m2): 1.4  Lateral E/e': 15.2  Medial E/e': 22.8              _____________________________________________________________________________  __        Report approved by: Dulce Maria Cornejo 2017 05:00 PM           ASSESSMENT/PLAN:                                                      1. Transient cerebral ischemia, unspecified type  No recurrent symptoms; he is now back on dual antiplatelet therapy; I think we are doing everything we can for secondary prevention of stroke    2. Major depressive disorder with single episode, in partial remission (H)  This is now very well controlled with cymbalta started last visit; continue current dose    3. Left ventricular systolic dysfunction  Incidentally finding on echo obtained to evaluate for cardio embolic source for TIA  Hospitalist  arranged for cardiology consult  No symptoms of decompensated CHF; he is now back on an ACE inhibitor and he has been on a beta blocker     4. Coronary artery disease involving native coronary artery of native heart without angina pectoris  No current chest pains  - lisinopril (PRINIVIL/ZESTRIL) 2.5 MG tablet; Take 1 tablet (2.5 mg) by mouth daily  Dispense: 90 tablet; Refill: 0    5. Essential hypertension, benign  Under adequate control; ACE inhibitor has been added back to regimen after stopping ramipril due to concern that it may have caused a recent bout of pancreatitis   - metoprolol (LOPRESSOR) 50 MG tablet; Take 1 tablet (50 mg) by mouth 2 times daily  Dispense: 180 tablet; Refill: 3    6. Diabetes type 2  Has been under reasonable control    FUTURE APPOINTMENTS:       - Follow-up visit in 3 months     Matt Morrissey MD  [facility]

## 2017-01-27 NOTE — NURSING NOTE
"Chief Complaint   Patient presents with     Hospital F/U       Initial /59 mmHg  Pulse 60  Temp(Src) 97.5  F (36.4  C) (Oral)  Ht 5' 5\" (1.651 m)  Wt 159 lb 11.2 oz (72.439 kg)  BMI 26.58 kg/m2  SpO2 97% Estimated body mass index is 26.58 kg/(m^2) as calculated from the following:    Height as of this encounter: 5' 5\" (1.651 m).    Weight as of this encounter: 159 lb 11.2 oz (72.439 kg).  BP completed using cuff size: regular, right arm  Sandie Nieves MA  "

## 2017-01-28 ASSESSMENT — PATIENT HEALTH QUESTIONNAIRE - PHQ9: SUM OF ALL RESPONSES TO PHQ QUESTIONS 1-9: 4

## 2017-02-02 ENCOUNTER — THERAPY VISIT (OUTPATIENT)
Dept: PHYSICAL THERAPY | Facility: CLINIC | Age: 82
End: 2017-02-02
Payer: MEDICARE

## 2017-02-02 DIAGNOSIS — M54.50 ACUTE RIGHT-SIDED LOW BACK PAIN WITHOUT SCIATICA: Primary | ICD-10-CM

## 2017-02-02 PROCEDURE — 97110 THERAPEUTIC EXERCISES: CPT | Mod: GP | Performed by: PHYSICAL THERAPIST

## 2017-02-03 NOTE — PROGRESS NOTES
"Subjective:    HPI                    Objective:    System    Physical Exam    General     ROS    Assessment/Plan:      DISCHARGE REPORT    Progress reporting period is from 12/30/2017 to 02/02/2017.       SUBJECTIVE  Subjective: Patient was hospitalized 2x since last visit--dizziness on the first episode, 2nd episode for a TIA--will see a cardiologist in a couple weeks to address.  He reports, \"my back has been pretty darn good. I don't remember when I noticed it last.\"  He does not feel like it is limiting him at this point.  He can stand for about 30 minutes without problems and can walk about 1/4 mile he thinks.      Current Pain level: 0/10.     Initial Pain level: 0/10.   Changes in function:  Yes, improved walking tolerance, improved standing tolerance.    Adverse reaction to treatment or activity: None.    OBJECTIVE  Objective: Lumbar AROM:  flexion WNL, NE; extension 25%, NE.  Balance:  SLS, floor, EO B <1\" today       ASSESSMENT/PLAN  Patient has been seen for 4 visits with treatment focusing on lumbar extension exercises as well as core and LE strengthening and balance.  He has made good progress throughout treatment and reports no pain or functional limitations due to LBP today.  He has a good HEP and should be able to use this to manage any residual symptoms and prevent recurrence.  He will continue with his HEP independently at this point.    Updated problem list and treatment plan: Diagnosis 1:  LBP  Decreased ROM/flexibility - home program  Decreased function - home program  Impaired posture - home program  STG/LTGs have been met or progress has been made towards goals:  Yes (See Goal flow sheet completed today.)  Assessment of Progress: The patient's condition is improving.  Self Management Plans:  Patient has been instructed in a home treatment program.  Patient is independent in a home treatment program.  Patient  has been instructed in self management of symptoms.  Patient is independent in self " management of symptoms.  Dustin continues to require the following intervention to meet STG and LTG's:  PT intervention is no longer required to meet STG/LTG.    Recommendations:  This patient is ready to be discharged from therapy and continue their home treatment program.    Please refer to the daily flowsheet for treatment today, total treatment time and time spent performing 1:1 timed codes.

## 2017-02-14 DIAGNOSIS — K29.00 OTHER ACUTE GASTRITIS WITHOUT HEMORRHAGE: ICD-10-CM

## 2017-02-14 RX ORDER — OMEPRAZOLE 40 MG/1
40 CAPSULE, DELAYED RELEASE ORAL DAILY
Qty: 90 CAPSULE | Refills: 3 | Status: CANCELLED | OUTPATIENT
Start: 2017-02-14

## 2017-02-14 NOTE — TELEPHONE ENCOUNTER
Pending Prescriptions:                       Disp   Refills    omeprazole (PRILOSEC) 40 MG capsule       90 cap*3            Sig: Take 1 capsule (40 mg) by mouth daily Take 30-60           minutes before a meal.          Last Written Prescription Date: 1/27/17  Last Fill Quantity: 90,  # refills: 3   Last Office Visit with [facility], P or [facility] prescribing provider: 1/27/17                                         Next 5 appointments (look out 90 days)     Apr 21, 2017  3:00 PM CDT   [facility]    [address] [facility] [id number] [phone]

## 2017-02-24 ENCOUNTER — OFFICE VISIT (OUTPATIENT)
Dept: CARDIOLOGY | Facility: CLINIC | Age: 82
End: 2017-02-24
Attending: INTERNAL MEDICINE
Payer: MEDICARE

## 2017-02-24 VITALS
WEIGHT: 157 LBS | HEIGHT: 65 IN | HEART RATE: 90 BPM | DIASTOLIC BLOOD PRESSURE: 60 MMHG | BODY MASS INDEX: 26.16 KG/M2 | SYSTOLIC BLOOD PRESSURE: 102 MMHG | OXYGEN SATURATION: 97 %

## 2017-02-24 DIAGNOSIS — I42.9 CARDIOMYOPATHY (H): Primary | ICD-10-CM

## 2017-02-24 DIAGNOSIS — I77.810 AORTIC ROOT DILATATION (H): ICD-10-CM

## 2017-02-24 DIAGNOSIS — I10 BENIGN ESSENTIAL HYPERTENSION: ICD-10-CM

## 2017-02-24 DIAGNOSIS — I25.10 CORONARY ARTERY DISEASE INVOLVING NATIVE CORONARY ARTERY OF NATIVE HEART WITHOUT ANGINA PECTORIS: ICD-10-CM

## 2017-02-24 PROCEDURE — 99204 OFFICE O/P NEW MOD 45 MIN: CPT | Performed by: INTERNAL MEDICINE

## 2017-02-24 PROCEDURE — 93000 ELECTROCARDIOGRAM COMPLETE: CPT | Performed by: INTERNAL MEDICINE

## 2017-02-24 RX ORDER — METOPROLOL SUCCINATE 50 MG/1
50 TABLET, EXTENDED RELEASE ORAL DAILY
Qty: 90 TABLET | Refills: 3 | Status: SHIPPED | OUTPATIENT
Start: 2017-02-24 | End: 2017-07-07 | Stop reason: DRUGHIGH

## 2017-02-24 NOTE — PROGRESS NOTES
REASON FOR CLINIC VISIT:  Followup recent hospitalization, new diagnosis of cardiomyopathy.      HISTORY OF PRESENT ILLNESS:  Mr. Pearce is a very pleasant but somewhat frail looking 89-year-old gentleman with a history of CVA, history of coronary artery disease with possible MI in the 1970s, hypertension, dyslipidemia, chronically occluded left carotid artery, diabetes, dyslipidemia, macular degeneration and BPH who was admitted in 01/2017, last month, with TIA with dysarthria, right facial droop and ataxia.  He underwent CT and MRA of the brain and MRA showed diffuse cerebral loss consistent with chronic small vessel changes, no evidence of any acute intracranial pathology was seen.  CT angiogram of the neck confirmed chronic occlusion of the left internal carotid artery from origin to siphon and severe stenosis of the left posterior cerebral artery petrous segment and moderate stenosis of the right internal carotid artery at its origin 50%.  He also underwent an echocardiogram at that time that showed moderately reduced LV systolic function with LVEF of 35%-40% with severe inferolateral wall hypokinesia.  Mild aortic stenosis and mildly dilated aortic root was seen.  He is now coming to Cardiology Clinic accompanied by his wife.  The patient has been living at home.  He has been having some wobbly/dizzy sensation which he describes as a lack of balance.  So far, fortunately, has not had a fall.  No presyncope or syncope.  Remarkably, the echocardiogram last month noted atrial fibrillation, but I reviewed the images personally and it looks like he was having significant PACs during the echocardiogram, which I confirmed on EKG done during the last hospitalization which shows sinus rhythm with frequent PACs, which is also confirmed on today's EKG.  The patient denies any palpitations.  He was on Plavix prior to last month's admission and given TIA, aspirin was also added.  He walks at least a quarter of a mile every  day and no chest discomfort or shortness with physical activity.  No orthopnea or PND.  He is presently on 50 mg b.i.d. of metoprolol tartrate, aspirin, Plavix, 2.5 mg daily of lisinopril, Lipitor 80 mg daily.  LDL checked last month was 87.  No tobacco exposure currently.      PHYSICAL EXAMINATION:   VITAL SIGNS:  Blood pressure 102/60, heart rate 90, weight 157 pounds, BMI 26.18.   GENERAL:  The patient appears pleasant, comfortable.   NECK:  Normal JVP, no bruit bilaterally.   CARDIOVASCULAR SYSTEM:  S1, S2 normal.  There is a very faint grade 2/6 ejection systolic murmur heard over the right upper sternal border, no S3, S4, rub or gallop.   RESPIRATORY SYSTEM:  Clear to auscultation bilaterally.   ABDOMEN:  Soft, nontender.   EXTREMITIES:  No pitting pedal edema.   NEUROLOGICAL:  Alert, oriented x3.   PSYCH:  Normal affect.   SKIN:  No obvious rash.   HEENT:  Mild pallor (hemoglobin last month 12.3).      EKG done today was personally reviewed by me and it shows sinus rhythm with PACs, bifascicular block.   Echocardiogram done last month, images again personally reviewed by me.      IMPRESSION AND PLAN:  A very delightful but frail looking 89-year-old gentleman with a history of stroke with recent TIA, hypertension, dyslipidemia, diabetes and remote history of MI in the 1970s, now echocardiogram showing moderately reduced LV systolic function with severe inferolateral hypokinesia.  Remarkably, he does not appear in congestive heart failure.  He also does not have any anginal symptoms.  I had a long discussion with the patient and his wife regarding the echocardiographic findings.  This is suspicious for ischemic cardiomyopathy given wall motion abnormalities.  We discussed the option of coronary angiogram, medical management, stress test.  Given the fact that he is asymptomatic without any anginal symptoms or congestive heart failure, his age, other comorbidities of recurrent stroke, somewhat frail status, at  this time, we decided to do conservative medical management for ischemic cardiomyopathy and coronary artery disease.  I recommended changing metoprolol tartrate to succinate, continue rest of the cardiac medications that include dual-antiplatelet therapy, high-intensity statin, ACE inhibitor.  I also recommended doing a Lexiscan stress test to risk stratify in terms of burden of ischemia.  In the absence of significant ischemia, my recommendation will be to continue medical management.  He is complaining on and off of dizziness which sounds to me more like a balance issue and I recommended that he uses a cane and if necessary even a walker to avoid any falls.  My office is going to update him with the results of the stress test and if no significant abnormality is seen, we can see him back in about 9-12 months in Cardiology Clinic.  In the interim, if he notices any exertional symptoms like chest discomfort or shortness with exertion, he should call us.      1.  Newly-diagnosed cardiomyopathy, ischemic cardiomyopathy, likely on the basis of regional wall motion abnormalities.  Clinically, not in congestive heart failure.   2.  CAD on the basis of previous history of MI, regional wall motion abnormality on echocardiogram.  Clinically, no anginal symptoms.   3.  History of stroke with recent TIA, being followed by Neurology, no aspirin and Plavix and high-intensity statin.  Known chronically occluded left internal carotid artery, 50% right internal carotid artery stenosis.   4.  Dyslipidemia.  LDL of 87 on high-intensity statin.   5.  Hypertension, well controlled, infact blood pressure on the lower side of normal.   6.  Diabetes.   7.  Somewhat frail status.      RECOMMENDATIONS:   1.  Change metoprolol tartrate to succinate.   2.  Continue aspirin, statin, ACE inhibitor.   3.  Lexiscan stress test.  My office is going to update him with the results of the Lexiscan stress test.  Unless significant ischemia is seen,  my recommendation will be to continue medical management.   4.  Return to Cardiology Clinic in about 9-12 months.         JUANITA LOPEZ MD             D: 2017 10:47   T: 2017 12:02   MT: ISREAL      Name:     SID AGUIAR   MRN:      [id number]        Account:      [id number]   :      1928           Service Date: 2017      Document: [id number]

## 2017-02-24 NOTE — PROGRESS NOTES
HPI and Plan:   See dictation    Orders Placed This Encounter   Procedures     NM Lexiscan stress test (nuc card)     Follow-Up with Cardiologist     EKG 12-lead complete w/read - Clinics (performed today)     Orders Placed This Encounter   Medications     metoprolol (TOPROL-XL) 50 MG 24 hr tablet     Sig: Take 1 tablet (50 mg) by mouth daily     Dispense:  90 tablet     Refill:  3     Medications Discontinued During This Encounter   Medication Reason     VITAMIN D, CHOLECALCIFEROL, PO Stopped by Patient     metoprolol (LOPRESSOR) 50 MG tablet          Encounter Diagnoses   Name Primary?     Coronary artery disease involving native coronary artery of native heart without angina pectoris      Cardiomyopathy (H) Yes     Aortic root dilatation (H)      Benign essential hypertension        CURRENT MEDICATIONS:  Current Outpatient Prescriptions   Medication Sig Dispense Refill     metoprolol (TOPROL-XL) 50 MG 24 hr tablet Take 1 tablet (50 mg) by mouth daily 90 tablet 3     lisinopril (PRINIVIL/ZESTRIL) 2.5 MG tablet Take 1 tablet (2.5 mg) by mouth daily 90 tablet 0     omeprazole (PRILOSEC) 40 MG capsule Take 1 capsule (40 mg) by mouth daily Take 30-60 minutes before a meal. 90 capsule 3     DULoxetine (CYMBALTA) 30 MG EC capsule Take 30 mg by mouth daily       tamsulosin (FLOMAX) 0.4 MG capsule Take 1 capsule (0.4 mg) by mouth daily 90 capsule 3     aspirin EC 81 MG EC tablet Take 1 tablet (81 mg) by mouth daily 30 tablet 0     HYDROcodone-acetaminophen (NORCO) 5-325 MG per tablet Take 1-2 tablets by mouth every 4 hours as needed for moderate to severe pain       glipiZIDE (GLIPIZIDE XL) 10 MG 24 hr tablet Take 1 tablet (10 mg) by mouth daily 90 tablet 3     METFORMIN HCL PO Take 1,000 mg by mouth 2 times daily (with meals)       ATORVASTATIN CALCIUM PO Take 80 mg by mouth daily       Psyllium (METAMUCIL PO) Take 1 packet by mouth daily        Clopidogrel Bisulfate, [id number], (PLAVIX PO) Take 75 mg by mouth daily     "    blood glucose monitoring (NO BRAND SPECIFIED) test strip Use to test blood sugar three times daily or as directed. 300 each 3     order for DME Equipment being ordered: True Matrix Blood Glucose meter. 1 Device 0       ALLERGIES     Allergies   Allergen Reactions     Oxycodone Other (See Comments)     \"TERRIBLE SWEATING\"     Sulfa Drugs      Tetracycline        PAST MEDICAL HISTORY:  Past Medical History   Diagnosis Date     Aortic root dilatation (H)      Benign essential hypertension 1/6/2017     Benign non-nodular prostatic hyperplasia with lower urinary tract symptoms 10/20/2016     CAD (coronary artery disease)      Cardiomyopathy (H)      Carotid artery stenosis 10/20/2016     Carotid occlusion, left      Coronary artery disease involving native coronary artery of native heart without angina pectoris 10/20/2016     Diabetes mellitus (H)      DJD (degenerative joint disease)      Gastro-oesophageal reflux disease      Gastroesophageal reflux disease without esophagitis 10/20/2016     Heart attack (H)      Hyperlipidemia      Hypertension      Mild cognitive impairment 10/20/2016     Nephrolithiasis      RECURRENT     Osteopenia      Paroxysmal atrial fibrillation (H)      PONV (postoperative nausea and vomiting)      Unspecified cerebral artery occlusion with cerebral infarction        PAST SURGICAL HISTORY:  Past Surgical History   Procedure Laterality Date     Cholecystectomy       Hernia repair       Genitourinary surgery       KIDNEY STONE REMOVAL X 4     Laser holmium lithotripsy ureter(s), insert stent, combined  3/2/2012     Procedure:COMBINED CYSTOSCOPY, URETEROSCOPY, LASER HOLMIUM LITHOTRIPSY URETER(S), INSERT STENT; CYSTOSCOPY, RIGHT RETROGRADES, RIGHT URETEROSCOPY, HOLMIUM LASER, RIGHT STENT PLACEMENT; Surgeon:ANJELICA LEÓN; Location:[facility]     Rotator cuff repair rt/lt       Esophagoscopy, gastroscopy, duodenoscopy (egd), combined N/A 12/26/2016     Procedure: COMBINED ESOPHAGOSCOPY, " GASTROSCOPY, DUODENOSCOPY (EGD);  Surgeon: Nasim Louis MD;  Location:  GI     Hc ugi endoscopy w eus N/A 12/29/2016     Procedure: COMBINED ENDOSCOPIC ULTRASOUND, ESOPHAGOSCOPY, GASTROSCOPY, DUODENOSCOPY (EGD);  Surgeon: Nasim Louis MD;  Location:  GI       FAMILY HISTORY:  Family History   Problem Relation Age of Onset     Heart Failure Father 81     Breast Cancer Mother        SOCIAL HISTORY:  Social History     Social History     Marital status:      Spouse name: N/A     Number of children: N/A     Years of education: N/A     Social History Main Topics     Smoking status: Former Smoker     Packs/day: 1.00     Years: 30.00     Types: Cigarettes     Quit date: 1/1/1999     Smokeless tobacco: Never Used     Alcohol use 4.2 oz/week     7 Standard drinks or equivalent per week      Comment: 1 drink per week     Drug use: No     Sexual activity: No     Other Topics Concern     None     Social History Narrative       Review of Systems:  Skin:  Positive for     Eyes:  Positive for glasses  ENT:  Positive for hearing loss  Respiratory:  Negative    Cardiovascular:    Positive for;edema;lightheadedness;dizziness  Gastroenterology: Negative    Genitourinary:  not assessed    Musculoskeletal:  Positive for arthritis;nocturnal cramping  Neurologic:  Positive for (occasional) headaches  Psychiatric:  Negative    Heme/Lymph/Imm:  Negative    Endocrine:  Positive for diabetes      Recent Lab Results:  LIPID RESULTS:  Lab Results   Component Value Date    CHOL 165 01/21/2017    HDL 48 01/21/2017    LDL 87 01/21/2017    TRIG 148 01/21/2017    CHOLHDLRATIO 5.1 (H) 12/18/2015       LIVER ENZYME RESULTS:  Lab Results   Component Value Date    AST 43 12/27/2016    ALT 50 12/27/2016       CBC RESULTS:  Lab Results   Component Value Date    WBC 5.5 01/21/2017    RBC 4.02 (L) 01/21/2017    HGB 12.3 (L) 01/21/2017    HCT 37.2 (L) 01/21/2017    MCV 93 01/21/2017    MCH 30.6 01/21/2017    MCHC 33.1  01/21/2017    RDW 12.9 01/21/2017     (L) 01/21/2017       BMP RESULTS:  Lab Results   Component Value Date     01/20/2017    POTASSIUM 4.2 01/20/2017    CHLORIDE 104 01/20/2017    CO2 30 01/20/2017    ANIONGAP 6 01/20/2017     (H) 01/20/2017    BUN 16 01/20/2017    CR 0.73 01/20/2017    GFRESTIMATED >90  Non  GFR Calc   01/20/2017    GFRESTBLACK >90   GFR Calc   01/20/2017    ALLISNO 8.6 01/20/2017        A1C RESULTS:  Lab Results   Component Value Date    A1C 7.9 (H) 12/26/2016       INR RESULTS:  Lab Results   Component Value Date    INR 1.03 01/20/2017    INR 0.99 08/19/2009           CC  Reymundo Richards MD   [facility]  [address]  [place] [address]

## 2017-02-24 NOTE — MR AVS SNAPSHOT
After Visit Summary   2/24/2017    Dustin Pearce    MRN: [id number]           Patient Information     Date Of Birth          1/31/1928        Visit Information        Provider Department      2/24/2017 10:15 AM Rafa Samuel MD [facility] [place]        Today's Diagnoses     Cardiomyopathy (H)    -  1    Coronary artery disease involving native coronary artery of native heart without angina pectoris        Aortic root dilatation (H)        Benign essential hypertension           Follow-ups after your visit        Additional Services     Follow-Up with Cardiologist                 Your next 10 appointments already scheduled     Apr 21, 2017  3:00 PM CDT   [facility]    [address] [facility] [address] [phone]           Bring a current list of meds and any records pertaining to this visit.  For Physicals, please bring immunization records and any forms needing to be filled out.  Please arrive 10 minutes early to complete paperwork.              Future tests that were ordered for you today     Open Future Orders        Priority Expected Expires Ordered    Follow-Up with Cardiologist Routine 11/21/2017 2/24/2018 2/24/2017    NM Lexiscan stress test (nuc card) Routine 3/3/2017 2/24/2018 2/24/2017            Who to contact     If you have questions or need follow up information about today's clinic visit or your schedule please contact [facility] directly at [phone].  Normal or non-critical lab and imaging results will be communicated to you by MyChart, letter or phone within 4 business days after the clinic has received the results. If you do not hear from us within 7 days, please contact the clinic through MyChart or phone. If you have a critical or abnormal lab result, we will notify you by phone as soon as possible.  Submit refill  "requests through IM5 or call your pharmacy and they will forward the refill request to us. Please allow 3 business days for your refill to be completed.          Additional Information About Your Visit        Care EveryWhere ID     This is your Care EveryWhere ID. This could be used by other organizations to access your [facility] medical records  [id number]        Your Vitals Were     Pulse Height Pulse Oximetry BMI (Body Mass Index)          90 1.651 m (5' 5\") 97% 26.13 kg/m2         Blood Pressure from Last 3 Encounters:   02/24/17 102/60   01/27/17 138/59   01/21/17 143/65    Weight from Last 3 Encounters:   02/24/17 71.2 kg (157 lb)   01/27/17 72.4 kg (159 lb 11.2 oz)   01/20/17 69 kg (152 lb 1.9 oz)              We Performed the Following     EKG 12-lead complete w/read - Clinics (performed today)     Follow-Up with Cardiologist          Today's Medication Changes          These changes are accurate as of: 2/24/17 10:36 AM.  If you have any questions, ask your nurse or doctor.               Start taking these medicines.        Dose/Directions    metoprolol 50 MG 24 hr tablet   Commonly known as:  TOPROL-XL   Used for:  Cardiomyopathy (H), Coronary artery disease involving native coronary artery of native heart without angina pectoris   Started by:  Rafa Samuel MD        Dose:  50 mg   Take 1 tablet (50 mg) by mouth daily   Quantity:  90 tablet   Refills:  3         Stop taking these medicines if you haven't already. Please contact your care team if you have questions.     metoprolol 50 MG tablet   Commonly known as:  LOPRESSOR   Stopped by:  Rafa Samuel MD                Where to get your medicines      These medications were sent to [id number] - [place], [place] - [address] AT [facility] [address], [facility] [address]    Hours:  24-hours Phone:  [phone]     metoprolol 50 MG 24 hr tablet                Primary Care Provider Office Phone # Fax # "    Matt Morrissey [phone]       [facility] [address]  [facility] [address]        Thank you!     Thank you for choosing [facility] [place]  for your care. Our goal is always to provide you with excellent care. Hearing back from our patients is one way we can continue to improve our services. Please take a few minutes to complete the written survey that you may receive in the mail after your visit with us. Thank you!             Your Updated Medication List - Protect others around you: Learn how to safely use, store and throw away your medicines at www.disposemymeds.org.          This list is accurate as of: 2/24/17 10:36 AM.  Always use your most recent med list.                   Brand Name Dispense Instructions for use    aspirin 81 MG EC tablet     30 tablet    Take 1 tablet (81 mg) by mouth daily       ATORVASTATIN CALCIUM PO      Take 80 mg by mouth daily       blood glucose monitoring test strip    no brand specified    300 each    Use to test blood sugar three times daily or as directed.       CYMBALTA 30 MG EC capsule   Generic drug:  DULoxetine      Take 30 mg by mouth daily       glipiZIDE 10 MG 24 hr tablet    glipiZIDE XL    90 tablet    Take 1 tablet (10 mg) by mouth daily       HYDROcodone-acetaminophen 5-325 MG per tablet    NORCO     Take 1-2 tablets by mouth every 4 hours as needed for moderate to severe pain       lisinopril 2.5 MG tablet    PRINIVIL/Zestril    90 tablet    Take 1 tablet (2.5 mg) by mouth daily       METAMUCIL PO      Take 1 packet by mouth daily       METFORMIN HCL PO      Take 1,000 mg by mouth 2 times daily (with meals)       metoprolol 50 MG 24 hr tablet    TOPROL-XL    90 tablet    Take 1 tablet (50 mg) by mouth daily       omeprazole 40 MG capsule    priLOSEC    90 capsule    Take 1 capsule (40 mg) by mouth daily Take 30-60 minutes before a meal.       order for DME     1 Device    Equipment being ordered:  True Matrix Blood Glucose meter.       PLAVIX PO      Take 75 mg by mouth daily       tamsulosin 0.4 MG capsule    FLOMAX    90 capsule    Take 1 capsule (0.4 mg) by mouth daily

## 2017-02-24 NOTE — LETTER
2/24/2017    Reymundo Richards MD   [facility]  [address]  [place] [address]    RE: Dustin Poncearik       Dear Colleague,    I had the pleasure of seeing Dustin Pearce in the [facility].    REASON FOR CLINIC VISIT:  Followup recent hospitalization, new diagnosis of cardiomyopathy.      Mr. Pearce is a very pleasant but somewhat frail looking 89-year-old gentleman with a history of CVA, history of coronary artery disease with possible MI in the 1970s, hypertension, dyslipidemia, chronically occluded left carotid artery, diabetes, dyslipidemia, macular degeneration and BPH who was admitted in 01/2017, last month, with TIA with dysarthria, right facial droop and ataxia.  He underwent CT and MRA of the brain and MRA showed diffuse cerebral loss consistent with chronic small vessel changes, no evidence of any acute intracranial pathology was seen.  CT angiogram of the neck confirmed chronic occlusion of the left internal carotid artery from origin to siphon and severe stenosis of the left posterior cerebral artery petrous segment and moderate stenosis of the right internal carotid artery at its origin 50%.  He also underwent an echocardiogram at that time that showed moderately reduced LV systolic function with LVEF of 35%-40% with severe inferolateral wall hypokinesia.  Mild aortic stenosis and mildly dilated aortic root was seen.  He is now coming to Cardiology Clinic accompanied by his wife.  The patient has been living at home.  He has been having some wobbly/dizzy sensation which he describes as a lack of balance.  So far, fortunately, has not had a fall.  No presyncope or syncope.  Remarkably, the echocardiogram last month noted atrial fibrillation, but I reviewed the images personally and it looks like he was having significant PACs during the echocardiogram, which I confirmed on EKG done during the last hospitalization which shows sinus rhythm with  frequent PACs, which is also confirmed on today's EKG.  The patient denies any palpitations.  He was on Plavix prior to last month's admission and given TIA, aspirin was also added.  He walks at least a quarter of a mile every day and no chest discomfort or shortness with physical activity.  No orthopnea or PND.  He is presently on 50 mg b.i.d. of metoprolol tartrate, aspirin, Plavix, 2.5 mg daily of lisinopril, Lipitor 80 mg daily.  LDL checked last month was 87.  No tobacco exposure currently.      PHYSICAL EXAMINATION:   VITAL SIGNS:  Blood pressure 102/60, heart rate 90, weight 157 pounds, BMI 26.18.   GENERAL:  The patient appears pleasant, comfortable.   NECK:  Normal JVP, no bruit bilaterally.   CARDIOVASCULAR SYSTEM:  S1, S2 normal.  There is a very faint grade 2/6 ejection systolic murmur heard over the right upper sternal border, no S3, S4, rub or gallop.   RESPIRATORY SYSTEM:  Clear to auscultation bilaterally.   ABDOMEN:  Soft, nontender.   EXTREMITIES:  No pitting pedal edema.   NEUROLOGICAL:  Alert, oriented x3.   PSYCH:  Normal affect.   SKIN:  No obvious rash.   HEENT:  Mild pallor (hemoglobin last month 12.3).      EKG done today was personally reviewed by me and it shows sinus rhythm with PACs, bifascicular block.   Echocardiogram done last month, images again personally reviewed by me.   Outpatient Encounter Prescriptions as of 2/24/2017   Medication Sig Dispense Refill     metoprolol (TOPROL-XL) 50 MG 24 hr tablet Take 1 tablet (50 mg) by mouth daily 90 tablet 3     lisinopril (PRINIVIL/ZESTRIL) 2.5 MG tablet Take 1 tablet (2.5 mg) by mouth daily 90 tablet 0     omeprazole (PRILOSEC) 40 MG capsule Take 1 capsule (40 mg) by mouth daily Take 30-60 minutes before a meal. 90 capsule 3     DULoxetine (CYMBALTA) 30 MG EC capsule Take 30 mg by mouth daily       tamsulosin (FLOMAX) 0.4 MG capsule Take 1 capsule (0.4 mg) by mouth daily 90 capsule 3     aspirin EC 81 MG EC tablet Take 1 tablet (81 mg) by  mouth daily 30 tablet 0     HYDROcodone-acetaminophen (NORCO) 5-325 MG per tablet Take 1-2 tablets by mouth every 4 hours as needed for moderate to severe pain Reported on 3/7/2017       glipiZIDE (GLIPIZIDE XL) 10 MG 24 hr tablet Take 1 tablet (10 mg) by mouth daily 90 tablet 3     METFORMIN HCL PO Take 1,000 mg by mouth 2 times daily (with meals)       ATORVASTATIN CALCIUM PO Take 80 mg by mouth daily       Psyllium (METAMUCIL PO) Take 1 packet by mouth daily        Clopidogrel Bisulfate, [id number], (PLAVIX PO) Take 75 mg by mouth daily        [DISCONTINUED] metoprolol (LOPRESSOR) 50 MG tablet Take 1 tablet (50 mg) by mouth 2 times daily 180 tablet 3     blood glucose monitoring (NO BRAND SPECIFIED) test strip Use to test blood sugar three times daily or as directed. 300 each 3     order for DME Equipment being ordered: True Matrix Blood Glucose meter. 1 Device 0     [DISCONTINUED] VITAMIN D, CHOLECALCIFEROL, PO Take 1,000 Units by mouth daily        No facility-administered encounter medications on file as of 2/24/2017.    IMPRESSION AND PLAN:  A very delightful but frail looking 89-year-old gentleman with a history of stroke with recent TIA, hypertension, dyslipidemia, diabetes and remote history of MI in the 1970s, now echocardiogram showing moderately reduced LV systolic function with severe inferolateral hypokinesia.  Remarkably, he does not appear in congestive heart failure.  He also does not have any anginal symptoms.  I had a long discussion with the patient and his wife regarding the echocardiographic findings.  This is suspicious for ischemic cardiomyopathy given wall motion abnormalities.  We discussed the option of coronary angiogram, medical management, stress test.  Given the fact that he is asymptomatic without any anginal symptoms or congestive heart failure, his age, other comorbidities of recurrent stroke, somewhat frail status, at this time, we decided to do conservative medical management for  ischemic cardiomyopathy and coronary artery disease.  I recommended changing metoprolol tartrate to succinate, continue rest of the cardiac medications that include dual-antiplatelet therapy, high-intensity statin, ACE inhibitor.  I also recommended doing a Lexiscan stress test to risk stratify in terms of burden of ischemia.  In the absence of significant ischemia, my recommendation will be to continue medical management.  He is complaining on and off of dizziness which sounds to me more like a balance issue and I recommended that he uses a cane and if necessary even a walker to avoid any falls.  My office is going to update him with the results of the stress test and if no significant abnormality is seen, we can see him back in about 9-12 months in Cardiology Clinic.  In the interim, if he notices any exertional symptoms like chest discomfort or shortness with exertion, he should call us.      1.  Newly-diagnosed cardiomyopathy, ischemic cardiomyopathy, likely on the basis of regional wall motion abnormalities.  Clinically, not in congestive heart failure.   2.  CAD on the basis of previous history of MI, regional wall motion abnormality on echocardiogram.  Clinically, no anginal symptoms.   3.  History of stroke with recent TIA, being followed by Neurology, no aspirin and Plavix and high-intensity statin.  Known chronically occluded left internal carotid artery, 50% right internal carotid artery stenosis.   4.  Dyslipidemia.  LDL of 87 on high-intensity statin.   5.  Hypertension, well controlled, infact blood pressure on the lower side of normal.   6.  Diabetes.   7.  Somewhat frail status.      RECOMMENDATIONS:   1.  Change metoprolol tartrate to succinate.   2.  Continue aspirin, statin, ACE inhibitor.   3.  Lexiscan stress test.  My office is going to update him with the results of the Lexiscan stress test.  Unless significant ischemia is seen, my recommendation will be to continue medical management.   4.   Return to Cardiology Clinic in about 9-12 months.     Again, thank you for allowing me to participate in the care of your patient.      Sincerely,    Rafa Samuel MD     [facility]  cc:   Matt Morrissey MD  [facility]   [address]  [facility] [address]

## 2017-03-07 ENCOUNTER — OFFICE VISIT (OUTPATIENT)
Dept: URGENT CARE | Facility: URGENT CARE | Age: 82
End: 2017-03-07
Payer: MEDICARE

## 2017-03-07 ENCOUNTER — RADIANT APPOINTMENT (OUTPATIENT)
Dept: GENERAL RADIOLOGY | Facility: CLINIC | Age: 82
End: 2017-03-07
Attending: FAMILY MEDICINE
Payer: MEDICARE

## 2017-03-07 VITALS
DIASTOLIC BLOOD PRESSURE: 82 MMHG | BODY MASS INDEX: 26.43 KG/M2 | SYSTOLIC BLOOD PRESSURE: 126 MMHG | OXYGEN SATURATION: 96 % | HEART RATE: 50 BPM | TEMPERATURE: 98.2 F | WEIGHT: 158.8 LBS

## 2017-03-07 DIAGNOSIS — L03.119 CELLULITIS OF HAND: ICD-10-CM

## 2017-03-07 DIAGNOSIS — M79.641 PAIN OF RIGHT HAND: Primary | ICD-10-CM

## 2017-03-07 DIAGNOSIS — M70.21 OLECRANON BURSITIS OF RIGHT ELBOW: ICD-10-CM

## 2017-03-07 LAB
BASOPHILS # BLD AUTO: 0 10E9/L (ref 0–0.2)
BASOPHILS NFR BLD AUTO: 0.3 %
DIFFERENTIAL METHOD BLD: ABNORMAL
EOSINOPHIL # BLD AUTO: 0.2 10E9/L (ref 0–0.7)
EOSINOPHIL NFR BLD AUTO: 1.8 %
ERYTHROCYTE [DISTWIDTH] IN BLOOD BY AUTOMATED COUNT: 13.1 % (ref 10–15)
HCT VFR BLD AUTO: 44.1 % (ref 40–53)
HGB BLD-MCNC: 14 G/DL (ref 13.3–17.7)
LYMPHOCYTES # BLD AUTO: 4.2 10E9/L (ref 0.8–5.3)
LYMPHOCYTES NFR BLD AUTO: 36.3 %
MCH RBC QN AUTO: 30.9 PG (ref 26.5–33)
MCHC RBC AUTO-ENTMCNC: 31.7 G/DL (ref 31.5–36.5)
MCV RBC AUTO: 97 FL (ref 78–100)
MONOCYTES # BLD AUTO: 1 10E9/L (ref 0–1.3)
MONOCYTES NFR BLD AUTO: 8.7 %
NEUTROPHILS # BLD AUTO: 6.1 10E9/L (ref 1.6–8.3)
NEUTROPHILS NFR BLD AUTO: 52.9 %
PLATELET # BLD AUTO: 198 10E9/L (ref 150–450)
RBC # BLD AUTO: 4.53 10E12/L (ref 4.4–5.9)
WBC # BLD AUTO: 11.6 10E9/L (ref 4–11)

## 2017-03-07 PROCEDURE — 99214 OFFICE O/P EST MOD 30 MIN: CPT | Performed by: FAMILY MEDICINE

## 2017-03-07 PROCEDURE — 85025 COMPLETE CBC W/AUTO DIFF WBC: CPT | Performed by: FAMILY MEDICINE

## 2017-03-07 PROCEDURE — 73110 X-RAY EXAM OF WRIST: CPT | Mod: RT

## 2017-03-07 PROCEDURE — 36415 COLL VENOUS BLD VENIPUNCTURE: CPT | Performed by: FAMILY MEDICINE

## 2017-03-07 RX ORDER — CEPHALEXIN 500 MG/1
500 CAPSULE ORAL 3 TIMES DAILY
Qty: 30 CAPSULE | Refills: 0 | Status: SHIPPED
Start: 2017-03-07 | End: 2017-05-31

## 2017-03-07 NOTE — MR AVS SNAPSHOT
After Visit Summary   3/7/2017    Dustin Pearce    MRN: [id number]           Patient Information     Date Of Birth          1/31/1928        Visit Information        Provider Department      3/7/2017 7:45 PM Magnolia Dennis MD [facility]        Today's Diagnoses     Pain of right hand    -  1    Cellulitis of hand        Olecranon bursitis of right elbow           Follow-ups after your visit        Your next 10 appointments already scheduled     Mar 10, 2017  1:00 PM CST   NM SH CV MPI WITH GEOFFREY 1 DAY with SCINM1   [facility] CV Nuclear Medicine (Cardiovascular Imaging at [facility])    [address]  [address]  [facility] [address] [phone]           For a ONE day exam: Allow 3-4 hours for test. For a TWO day exam: Allow 2 hours PER day for test.  You may need to stop some medicines before the test. Follow your doctor s orders. - If you take a beta blocker: Follow your doctor s specific instructions on taking it prior to and on the day of your exam. - If you take Aggrenox or dipyridamole (Persantine, Permole), stop taking it 48 hours before your test. - If you take Viagra, Cialis or Levitra, stop taking it 48 hours before your test. - If you take theophylline or aminophylline, stop taking it 12 hours before your test.  For patients with diabetes: - If you take insulin, call your diabetes care team. Ask if you should take a 1/2 dose the morning of your test. - If you take diabetes medicine by mouth, don t take it on the morning of your test. Bring it with you to take after the test. (If you have questions, call your diabetes care team.)  Do not take nitrates on the day of your test. Do not wear your Nitro-Patch.  Stop all caffeine 12 hours before the test. This includes coffee, tea, soda pop, chocolate and certain medicines (such as Anacin, Excedrin and NoDoz). Also avoid decaf coffee and tea, as these contain small amounts of  caffeine.  No alcohol, smoking or other tobacco for 12 hours before the test.  Stop eating 3 hours before the test. You may drink water.  Please wear a loose two-piece outfit. If you will have an exercise test, bring rubber-soled walking shoes.  When you arrive, please tell us if you: - Have diabetes - Are breastfeeding - May be pregnant - Have a pacemaker of ICD (implantable defibrillator).  Please call your Imaging Department at your exam site with any questions.            Apr 21, 2017  3:00 PM CDT   Office Visit with Matt Morrissey MD   [facility]    [address] [facility] [address] [phone]           Bring a current list of meds and any records pertaining to this visit.  For Physicals, please bring immunization records and any forms needing to be filled out.  Please arrive 10 minutes early to complete paperwork.              Who to contact     If you have questions or need follow up information about today's clinic visit or your schedule please contact [facility] directly at [phone].  Normal or non-critical lab and imaging results will be communicated to you by MyChart, letter or phone within 4 business days after the clinic has received the results. If you do not hear from us within 7 days, please contact the clinic through MyChart or phone. If you have a critical or abnormal lab result, we will notify you by phone as soon as possible.  Submit refill requests through [facility] or call your pharmacy and they will forward the refill request to us. Please allow 3 business days for your refill to be completed.          Additional Information About Your Visit        Care EveryWhere ID     This is your Care EveryWhere ID. This could be used by other organizations to access your [place] medical records  [id number]        Your Vitals Were     Pulse Temperature Pulse Oximetry BMI (Body Mass Index)          50 98.2  F (36.8  C)  (Oral) 96% 26.43 kg/m2         Blood Pressure from Last 3 Encounters:   03/07/17 126/82   02/24/17 102/60   01/27/17 138/59    Weight from Last 3 Encounters:   03/07/17 158 lb 12.8 oz (72 kg)   02/24/17 157 lb (71.2 kg)   01/27/17 159 lb 11.2 oz (72.4 kg)              We Performed the Following     CBC with platelets differential     XR Wrist Right G/E 3 Views          Today's Medication Changes          These changes are accurate as of: 3/7/17 10:44 PM.  If you have any questions, ask your nurse or doctor.               Start taking these medicines.        Dose/Directions    cephALEXin 500 MG capsule   Commonly known as:  KEFLEX   Used for:  Cellulitis of hand   Started by:  Magnolia Dennis MD        Dose:  500 mg   Take 1 capsule (500 mg) by mouth 3 times daily   Quantity:  30 capsule   Refills:  0            Where to get your medicines      These medications were sent to [facility] [address] [place] [address] [address] AT [address]  [address], [facility] [address]    Hours:  24-hours Phone:  [phone]     cephALEXin 500 MG capsule                Primary Care Provider Office Phone # Fax #    Matt Morrissey [phone]       [facility] [address]  [facility] [address]        Thank you!     Thank you for choosing [facility]  for your care. Our goal is always to provide you with excellent care. Hearing back from our patients is one way we can continue to improve our services. Please take a few minutes to complete the written survey that you may receive in the mail after your visit with us. Thank you!             Your Updated Medication List - Protect others around you: Learn how to safely use, store and throw away your medicines at www.disposemymeds.org.          This list is accurate as of: 3/7/17 10:44 PM.  Always use your most recent med list.                   Brand Name Dispense Instructions for use    aspirin  81 MG EC tablet     30 tablet    Take 1 tablet (81 mg) by mouth daily       ATORVASTATIN CALCIUM PO      Take 80 mg by mouth daily       blood glucose monitoring test strip    no brand specified    300 each    Use to test blood sugar three times daily or as directed.       cephALEXin 500 MG capsule    KEFLEX    30 capsule    Take 1 capsule (500 mg) by mouth 3 times daily       CYMBALTA 30 MG EC capsule   Generic drug:  DULoxetine      Take 30 mg by mouth daily       glipiZIDE 10 MG 24 hr tablet    glipiZIDE XL    90 tablet    Take 1 tablet (10 mg) by mouth daily       HYDROcodone-acetaminophen 5-325 MG per tablet    NORCO     Take 1-2 tablets by mouth every 4 hours as needed for moderate to severe pain Reported on 3/7/2017       lisinopril 2.5 MG tablet    PRINIVIL/Zestril    90 tablet    Take 1 tablet (2.5 mg) by mouth daily       METAMUCIL PO      Take 1 packet by mouth daily       METFORMIN HCL PO      Take 1,000 mg by mouth 2 times daily (with meals)       metoprolol 50 MG 24 hr tablet    TOPROL-XL    90 tablet    Take 1 tablet (50 mg) by mouth daily       omeprazole 40 MG capsule    priLOSEC    90 capsule    Take 1 capsule (40 mg) by mouth daily Take 30-60 minutes before a meal.       order for DME     1 Device    Equipment being ordered: True Matrix Blood Glucose meter.       PLAVIX PO      Take 75 mg by mouth daily       tamsulosin 0.4 MG capsule    FLOMAX    90 capsule    Take 1 capsule (0.4 mg) by mouth daily

## 2017-03-08 NOTE — NURSING NOTE
"Chief Complaint   Patient presents with     Arm Pain     swelling and pain in right elbow, arm and hand x 4 days - had weeping from elbow - denies injury        Initial /82 (BP Location: Right arm, Patient Position: Chair, Cuff Size: Adult Regular)  Pulse 50  Temp 98.2  F (36.8  C) (Oral)  Wt 158 lb 12.8 oz (72 kg)  SpO2 96%  BMI 26.43 kg/m2 Estimated body mass index is 26.43 kg/(m^2) as calculated from the following:    Height as of 2/24/17: 5' 5\" (1.651 m).    Weight as of this encounter: 158 lb 12.8 oz (72 kg).  Medication Reconciliation: complete    "

## 2017-03-08 NOTE — PROGRESS NOTES
Chief Complaint   Patient presents with     Arm Pain     swelling and pain in right elbow, arm and hand x 4 days - had weeping from elbow - denies injury      SUBJECTIVE:  Dustin ROBERTS Krisarik, a 89 year old male htn , CAD, DM , DJD , Hyperlipidemia, kidney stones  scheduled an appointment to discuss the following issues:     Pain of right hand  Cellulitis of hand  Olecranon bursitis of right elbow     He is here with pain in the right hand involving the wrist for 4 days also has been noticing swelling in the elbow too which appears like bursitis   He describes the pain in the hand being there all the time any movement of the wrist makes pain worse , he has no fever or chills , he has been noticing some redness and some swelling of the hands recently   He has no systemic symptoms , he has no recent injury or any fall   Has no h/o of any gout       Past Medical History   Diagnosis Date     Aortic root dilatation (H)      Benign essential hypertension 1/6/2017     Benign non-nodular prostatic hyperplasia with lower urinary tract symptoms 10/20/2016     CAD (coronary artery disease)      Cardiomyopathy (H)      Carotid artery stenosis 10/20/2016     Carotid occlusion, left      Coronary artery disease involving native coronary artery of native heart without angina pectoris 10/20/2016     Diabetes mellitus (H)      DJD (degenerative joint disease)      Gastro-oesophageal reflux disease      Gastroesophageal reflux disease without esophagitis 10/20/2016     Heart attack (H)      Hyperlipidemia      Hypertension      Mild cognitive impairment 10/20/2016     Nephrolithiasis      RECURRENT     Osteopenia      Paroxysmal atrial fibrillation (H)      PONV (postoperative nausea and vomiting)      Unspecified cerebral artery occlusion with cerebral infarction       Past Surgical History   Procedure Laterality Date     Cholecystectomy       Hernia repair       Genitourinary surgery       KIDNEY STONE REMOVAL X 4     Laser holmium  lithotripsy ureter(s), insert stent, combined  3/2/2012     Procedure:COMBINED CYSTOSCOPY, URETEROSCOPY, LASER HOLMIUM LITHOTRIPSY URETER(S), INSERT STENT; CYSTOSCOPY, RIGHT RETROGRADES, RIGHT URETEROSCOPY, HOLMIUM LASER, RIGHT STENT PLACEMENT; Surgeon:ANJELICA LEÓN; Location: OR     Rotator cuff repair rt/lt       Esophagoscopy, gastroscopy, duodenoscopy (egd), combined N/A 12/26/2016     Procedure: COMBINED ESOPHAGOSCOPY, GASTROSCOPY, DUODENOSCOPY (EGD);  Surgeon: Nasim Louis MD;  Location:  GI     Hc ugi endoscopy w eus N/A 12/29/2016     Procedure: COMBINED ENDOSCOPIC ULTRASOUND, ESOPHAGOSCOPY, GASTROSCOPY, DUODENOSCOPY (EGD);  Surgeon: Nasim Louis MD;  Location:  GI        Social History     Social History     Marital status:      Spouse name: N/A     Number of children: N/A     Years of education: N/A     Occupational History     Not on file.     Social History Main Topics     Smoking status: Former Smoker     Packs/day: 1.00     Years: 30.00     Types: Cigarettes     Quit date: 1/1/1999     Smokeless tobacco: Never Used     Alcohol use 4.2 oz/week     7 Standard drinks or equivalent per week      Comment: 1 drink per week     Drug use: No     Sexual activity: No     Other Topics Concern     Not on file     Social History Narrative        Current Outpatient Prescriptions   Medication Sig Dispense Refill     cephALEXin (KEFLEX) 500 MG capsule Take 1 capsule (500 mg) by mouth 3 times daily 30 capsule 0     metoprolol (TOPROL-XL) 50 MG 24 hr tablet Take 1 tablet (50 mg) by mouth daily 90 tablet 3     lisinopril (PRINIVIL/ZESTRIL) 2.5 MG tablet Take 1 tablet (2.5 mg) by mouth daily 90 tablet 0     omeprazole (PRILOSEC) 40 MG capsule Take 1 capsule (40 mg) by mouth daily Take 30-60 minutes before a meal. 90 capsule 3     DULoxetine (CYMBALTA) 30 MG EC capsule Take 30 mg by mouth daily       tamsulosin (FLOMAX) 0.4 MG capsule Take 1 capsule (0.4 mg) by mouth daily 90  capsule 3     aspirin EC 81 MG EC tablet Take 1 tablet (81 mg) by mouth daily 30 tablet 0     HYDROcodone-acetaminophen (NORCO) 5-325 MG per tablet Take 1-2 tablets by mouth every 4 hours as needed for moderate to severe pain Reported on 3/7/2017       glipiZIDE (GLIPIZIDE XL) 10 MG 24 hr tablet Take 1 tablet (10 mg) by mouth daily 90 tablet 3     METFORMIN HCL PO Take 1,000 mg by mouth 2 times daily (with meals)       ATORVASTATIN CALCIUM PO Take 80 mg by mouth daily       blood glucose monitoring (NO BRAND SPECIFIED) test strip Use to test blood sugar three times daily or as directed. 300 each 3     order for DME Equipment being ordered: True Matrix Blood Glucose meter. 1 Device 0     Psyllium (METAMUCIL PO) Take 1 packet by mouth daily        Clopidogrel Bisulfate, 7480042293, (PLAVIX PO) Take 75 mg by mouth daily          Health Maintenance   Topic Date Due     EYE EXAM Q1 YEAR( NO INBASKET)  01/31/1929     ADVANCE DIRECTIVE PLANNING Q5 YRS (NO INBASKET)  01/31/1946     A1C Q6 MO( NO INBASKET)  06/26/2017     INFLUENZA VACCINE (SYSTEM ASSIGNED)  09/01/2017     FOOT EXAM Q1 YEAR( NO INBASKET)  10/20/2017     MICROALBUMIN Q1 YEAR( NO INBASKET)  10/20/2017     FALL RISK ASSESSMENT  01/06/2018     CREATININE Q1 YEAR (NO INBASKET)  01/20/2018     LIPID MONITORING Q1 YEAR( NO INBASKET)  01/21/2018     TSH W/ FREE T4 REFLEX Q2 YEAR (NO INBASKET)  01/20/2019     TETANUS IMMUNIZATION (SYSTEM ASSIGNED)  11/01/2020     PNEUMOCOCCAL  Completed        ROS:  CONSTITUTIONAL:no fever, no chills or sweats, no excessive fatigue, no significant change in weight  CV: neg   RESP -neg  GI:  Neg   NEURO: neg   MSK - rt hand and wrist pain and rt elbow swelling and pain   Skin - neg   Pyschiatry-neg     OBJECTIVE:  /82 (BP Location: Right arm, Patient Position: Chair, Cuff Size: Adult Regular)  Pulse 50  Temp 98.2  F (36.8  C) (Oral)  Wt 158 lb 12.8 oz (72 kg)  SpO2 96%  BMI 26.43 kg/m2      EXAM:  GENERAL APPEARANCE: healthy,  alert and no distress  EYES: EOMI,  PERRL  HENT: ear canals and TM's normal and nose and mouth without ulcers or lesions  RESP: lungs clear to auscultation - no rales, rhonchi or wheezes  CV: regular rates and rhythm, normal S1 S2, no S3 or S4 and no murmur, click or rub -  ABDOMEN:  soft, nontender, no HSM or masses and bowel sounds normal  MS: tender to palpation on the dorsal  and palmar aspect of the wrist with mild swelling over the dorsal aspect   There is some redness on the rt wrist and rt hand extending over 2 cm above the wrist joint dorsally   There is no definite streaking noted, there is also 3cm cystic swelling over the rt  elbow - olecranon bursitis    PSYCH: mentation appears normal and affect normal/bright    Results for orders placed or performed in visit on 03/07/17   XR Wrist Right G/E 3 Views    Narrative    XR WRIST RT G/E 3 VW    3/7/2017 9:54 PM      HISTORY: Pain in right hand    COMPARISON: None.    FINDINGS:  There is normal osseous alignment.  No fractures are  identified.  Degenerative changes seen in the radiocarpal joint.  Chondrocalcinosis is noted. Vascular calcifications are present.      Impression    IMPRESSION:   1. No fractures are identified.  2. Arthritic changes    KAM PHAN MD   CBC with platelets differential   Result Value Ref Range    WBC 11.6 (H) 4.0 - 11.0 10e9/L    RBC Count 4.53 4.4 - 5.9 10e12/L    Hemoglobin 14.0 13.3 - 17.7 g/dL    Hematocrit 44.1 40.0 - 53.0 %    MCV 97 78 - 100 fl    MCH 30.9 26.5 - 33.0 pg    MCHC 31.7 31.5 - 36.5 g/dL    RDW 13.1 10.0 - 15.0 %    Platelet Count 198 150 - 450 10e9/L    Diff Method Automated Method     % Neutrophils 52.9 %    % Lymphocytes 36.3 %    % Monocytes 8.7 %    % Eosinophils 1.8 %    % Basophils 0.3 %    Absolute Neutrophil 6.1 1.6 - 8.3 10e9/L    Absolute Lymphocytes 4.2 0.8 - 5.3 10e9/L    Absolute Monocytes 1.0 0.0 - 1.3 10e9/L    Absolute Eosinophils 0.2 0.0 - 0.7 10e9/L    Absolute Basophils 0.0 0.0 - 0.2 10e9/L            ASSESSMENT/PLAN:  Dustin was seen today for arm pain.    Diagnoses and all orders for this visit:    Pain of right hand  -     CBC with platelets differential  -     XR Wrist Right G/E 3 Views    Cellulitis of hand  -     cephALEXin (KEFLEX) 500 MG capsule; Take 1 capsule (500 mg) by mouth 3 times daily    Olecranon bursitis of right elbow    discussed with that as has some redness over the wrist and hand would consider treating with antibiotic   Also continue doing tylenol for the pain   I doubt septic arthritis   Assured him about olecranon bursitis that its benign and would resolve with ice and elevation  But if pain worsens needs to follow up     Follow up if  symptoms fail to improve or worsens   Pt understood and agreed with plan       Spent>25 minutes with patient and > 50% of the time was for counselling       Magnolia Dennis MD

## 2017-03-10 ENCOUNTER — HOSPITAL ENCOUNTER (OUTPATIENT)
Dept: CARDIOLOGY | Facility: CLINIC | Age: 82
Discharge: HOME | End: 2017-03-10
Attending: INTERNAL MEDICINE | Admitting: INTERNAL MEDICINE
Payer: MEDICARE

## 2017-03-10 DIAGNOSIS — I42.9 CARDIOMYOPATHY (H): ICD-10-CM

## 2017-03-10 DIAGNOSIS — I25.10 CORONARY ARTERY DISEASE INVOLVING NATIVE CORONARY ARTERY OF NATIVE HEART WITHOUT ANGINA PECTORIS: ICD-10-CM

## 2017-03-10 PROCEDURE — 25000128 H RX IP 250 OP 636: Performed by: INTERNAL MEDICINE

## 2017-03-10 PROCEDURE — 93018 CV STRESS TEST I&R ONLY: CPT | Performed by: INTERNAL MEDICINE

## 2017-03-10 PROCEDURE — 93016 CV STRESS TEST SUPVJ ONLY: CPT | Performed by: INTERNAL MEDICINE

## 2017-03-10 PROCEDURE — 78452 HT MUSCLE IMAGE SPECT MULT: CPT | Mod: 26 | Performed by: INTERNAL MEDICINE

## 2017-03-10 PROCEDURE — 78452 HT MUSCLE IMAGE SPECT MULT: CPT

## 2017-03-10 PROCEDURE — A9502 TC99M TETROFOSMIN: HCPCS | Performed by: INTERNAL MEDICINE

## 2017-03-10 PROCEDURE — 34300033 ZZH RX 343: Performed by: INTERNAL MEDICINE

## 2017-03-10 RX ORDER — AMINOPHYLLINE 25 MG/ML
50-100 INJECTION, SOLUTION INTRAVENOUS
Status: DISCONTINUED | OUTPATIENT
Start: 2017-03-10 | End: 2017-03-11 | Stop reason: HOSPADM

## 2017-03-10 RX ORDER — ALBUTEROL SULFATE 90 UG/1
2 AEROSOL, METERED RESPIRATORY (INHALATION) EVERY 5 MIN PRN
Status: DISCONTINUED | OUTPATIENT
Start: 2017-03-10 | End: 2017-03-11 | Stop reason: HOSPADM

## 2017-03-10 RX ORDER — REGADENOSON 0.08 MG/ML
0.4 INJECTION, SOLUTION INTRAVENOUS ONCE
Status: COMPLETED | OUTPATIENT
Start: 2017-03-10 | End: 2017-03-10

## 2017-03-10 RX ORDER — ACYCLOVIR 200 MG/1
0-1 CAPSULE ORAL
Status: DISCONTINUED | OUTPATIENT
Start: 2017-03-10 | End: 2017-03-11 | Stop reason: HOSPADM

## 2017-03-10 RX ADMIN — TETROFOSMIN 3.48 MCI.: 0.23 INJECTION, POWDER, LYOPHILIZED, FOR SOLUTION INTRAVENOUS at 13:12

## 2017-03-10 RX ADMIN — TETROFOSMIN 9.48 MCI.: 0.23 INJECTION, POWDER, LYOPHILIZED, FOR SOLUTION INTRAVENOUS at 14:42

## 2017-03-10 RX ADMIN — REGADENOSON 0.4 MG: 0.08 INJECTION, SOLUTION INTRAVENOUS at 14:38

## 2017-03-13 ENCOUNTER — DOCUMENTATION ONLY (OUTPATIENT)
Dept: CARDIOLOGY | Facility: CLINIC | Age: 82
End: 2017-03-13

## 2017-03-13 NOTE — PROGRESS NOTES
"Results of Lexiscan stress test 3/10/17 shown below. Ordered at OV w/ Dr. Samuel 2/24/17 due to CAD on the basis of previous history of MI and regional wall abnormalities on echocardiogram. Per note at OV w/ Dr. Samuel 2/24/17, \"Unless significant ischemia is seen, my recommendation will be to continue medical management.\" Will route to Dr. Samuel for review. PRADEEP Funez RN      Order   NM Lexiscan stress test (nuc card) [[id number]] (Order [id number])   Exam Information   Exam Date Exam Time Accession # Performing Department Results    3/10/17  2:44 PM [id number] [facility] Nuclear Medicine    [employer] Interactive Report and InfoRx   View the interactive report   PACS Images   Show images for NM Lexiscan stress test (nuc card)   Study Result   GATED MYOCARDIAL PERFUSION SCINTIGRAPHY WITH INTRAVENOUS PHARMACOLOGIC  VASODILATATION LEXISCAN -ONE DAY STUDY     3/10/2017 2:44 PM SID AGUIAR 89 years Male 1/31/1928.     Indication/Clinical History: Cardiomyopathy     Impression  1. Myocardial perfusion imaging using single isotope technique  demonstrated a medium-sized perfusion defect of severe intensity  involving the mid to basal inferior and inferoseptal walls as well as  the basal inferolateral wall which is mildly reversible and consistent  with a prior myocardial infarction in the RCA/circumflex distribution  with mild ezekiel-infarct ischemia.   2. Gated images demonstrated akinesis of the mid to basal inferior and  inferolateral walls. The left ventricular systolic function is 37% at  rest and 28% on the post stress images.  3. There is no previous study for comparison.     Procedure  Pharmacologic stress testing was performed with Lexiscan at a rate of  0.08 mg/ml rapid bolus injection, for 15 seconds, 0.4 mg/5ml  intravenously. Low-level exercise was not performed along with the  vasodilator infusion. The heart rate was 70 at baseline and fabrice to  79 beats per minute during the Lexiscan infusion. " The rest blood  pressure was 118/72 mmHg and was 1/22/1972 mm Hg during Lexiscan  infusion. The patient experienced lightheadedness during the test.     Myocardial perfusion imaging was performed at rest, approximately 45  minutes after the injection intravenously of 3.48 mCi of Tc-99m  Myoview. At peak pharmacologic effect, 10-20 seconds after Lexiscan,   the patient was injected intravenously with 9.48 mCi of Tc-99m  Myoview. The post-stress tomographic imaging was performed  approximately 60 minutes after stress.     EKG Findings  The resting EKG demonstrated normal sinus rhythm with left anterior  fascicular block and right bundle branch block. The stress EKG  demonstrated occasional PACs and PVCs with no evidence of inducible  ischemia.     Tomographic Findings  Overall, the study quality is good . On the stress images, a  medium-sized perfusion defect of severe intensity involving the mid to  basal inferior and inferoseptal walls as well as the basal  inferolateral wall is noted. On the rest images, the same perfusion  defect is noted with mild improvement at the level of the mid to basal  inferior and inferoseptal walls . Gated images demonstrated akinesis  of the mid to basal inferior and inferolateral walls. The left  ventricular ejection fraction was calculated to be 37% at rest and 28%  on the post stress images. TID was absent.     JOSE BLACK MD

## 2017-03-13 NOTE — PROGRESS NOTES
Only mild ezekiel infarct ischemia-due to co morbidities, frail status, lack of anginal symptoms or CHF symptoms recommend continued medical management for CAD/ICM. F/u as planned.    Thanks  Rafa

## 2017-03-13 NOTE — PROGRESS NOTES
Call made to patient to update him on the results of Lexiscan stress test 3/10/17 and recommendations per Dr. Samuel below for continued medical management of CAD/ICM. Unable to reach patient. Left message requesting call back to review. Received call back from patient's friend Halina Dhillon. Consent to communicate on file. Reviewed results and recommendations with Halina. Patient to follow up with Dr. Samuel in 9 to 12 months. She stated understanding and had no further questions at this time. PRADEEP Funez RN

## 2017-04-21 ENCOUNTER — OFFICE VISIT (OUTPATIENT)
Dept: FAMILY MEDICINE | Facility: CLINIC | Age: 82
End: 2017-04-21
Payer: MEDICARE

## 2017-04-21 VITALS
DIASTOLIC BLOOD PRESSURE: 53 MMHG | SYSTOLIC BLOOD PRESSURE: 131 MMHG | TEMPERATURE: 97.4 F | OXYGEN SATURATION: 98 % | BODY MASS INDEX: 25.88 KG/M2 | WEIGHT: 161 LBS | RESPIRATION RATE: 10 BRPM | HEIGHT: 66 IN | HEART RATE: 59 BPM

## 2017-04-21 DIAGNOSIS — I10 BENIGN ESSENTIAL HYPERTENSION: ICD-10-CM

## 2017-04-21 DIAGNOSIS — G63 POLYNEUROPATHY ASSOCIATED WITH UNDERLYING DISEASE (H): ICD-10-CM

## 2017-04-21 DIAGNOSIS — E11.51 TYPE 2 DIABETES MELLITUS WITH DIABETIC PERIPHERAL ANGIOPATHY WITHOUT GANGRENE, WITHOUT LONG-TERM CURRENT USE OF INSULIN (H): Primary | ICD-10-CM

## 2017-04-21 DIAGNOSIS — L98.9 SKIN LESION: ICD-10-CM

## 2017-04-21 PROBLEM — G62.9 PERIPHERAL NEUROPATHY: Status: ACTIVE | Noted: 2017-04-21

## 2017-04-21 LAB — HBA1C MFR BLD: 8.3 % (ref 4.3–6)

## 2017-04-21 PROCEDURE — 83036 HEMOGLOBIN GLYCOSYLATED A1C: CPT | Performed by: INTERNAL MEDICINE

## 2017-04-21 PROCEDURE — 36415 COLL VENOUS BLD VENIPUNCTURE: CPT | Performed by: INTERNAL MEDICINE

## 2017-04-21 PROCEDURE — 99214 OFFICE O/P EST MOD 30 MIN: CPT | Performed by: INTERNAL MEDICINE

## 2017-04-21 RX ORDER — DULOXETIN HYDROCHLORIDE 30 MG/1
30 CAPSULE, DELAYED RELEASE ORAL DAILY
Qty: 90 CAPSULE | Refills: 3 | Status: SHIPPED | OUTPATIENT
Start: 2017-04-21 | End: 2018-06-29

## 2017-04-21 RX ORDER — GLIPIZIDE 10 MG/1
10 TABLET ORAL
Qty: 180 TABLET | Refills: 3 | Status: SHIPPED | OUTPATIENT
Start: 2017-04-21 | End: 2017-07-24

## 2017-04-21 NOTE — LETTER
[facility]  [address] [facility]  [address]  [place]  [address]  Tel: [phone]    April 24, 2017    Dustin Pearce  [address] [facility] [address]        Dear Mr. Pearce,    The following letter pertains to your most recent diagnostic tests:     Your A1c has increased to higher than you goal A1c of that less than 8.  To help your left foot ulcer have the best chance of healing, we should try to get your sugars better controlled.  I recommend that you stop taking glipizide XL 10mg tablets and that you replace glipizide XL with immediate release glipizide.  I would like you to take 10mg of immediate release glipizide twice daily with breakfast and dinner.  I have sent a new prescritpion for glipizide immediate release tablets to your pharmacy.  If your sugar drops below 90 after this medication change or you have symptoms of low blood sugar, then please inform me.  We need to recheck your hemoglobin A1c in 3 months.       Follow up:  Office visit appointment with A1c in 3 months or return sooner as needed.        Sincerely,    Matt Morrisesy MD/[facility]          Enclosure: Lab Results

## 2017-04-21 NOTE — NURSING NOTE
"Chief Complaint   Patient presents with     Hospital F/U       Initial /53  Pulse 59  Temp 97.4  F (36.3  C) (Oral)  Resp 10  Ht 5' 5.5\" (1.664 m)  Wt 161 lb (73 kg)  SpO2 98%  BMI 26.38 kg/m2 Estimated body mass index is 26.38 kg/(m^2) as calculated from the following:    Height as of this encounter: 5' 5.5\" (1.664 m).    Weight as of this encounter: 161 lb (73 kg).  Medication Reconciliation: complete       Yeny Montalvo MA     "

## 2017-04-21 NOTE — PROGRESS NOTES
SUBJECTIVE:                                                    Dustin Pearce is a 89 year old male who presents to clinic today for the following health issues:      Diabetes Follow-up    Patient is checking blood sugars: once daily.  Results are as follows:         am - less than 130    Diabetic concerns: None     Symptoms of hypoglycemia (low blood sugar): none     Paresthesias (numbness or burning in feet) or sores: Yes , and ulcer on left foot that is not healing quickly for which he is being followed by podiatry and had his wound debrided this AM, and MRI was ordered to exclude osteomyelitis      Date of last diabetic eye exam:        Problem list and histories reviewed & adjusted, as indicated.  Additional history: as documented    Patient Active Problem List   Diagnosis     Coronary artery disease involving native coronary artery of native heart without angina pectoris     Type 2 diabetes mellitus with diabetic peripheral angiopathy without gangrene, without long-term current use of insulin (H)     Mild cognitive impairment     Hyperlipidemia LDL goal <70     Nephrolithiasis     Benign non-nodular prostatic hyperplasia with lower urinary tract symptoms     Gastroesophageal reflux disease without esophagitis     Cerebrovascular accident (H)     Carotid artery stenosis     Abdominal aortic aneurysm (H)     Acute pancreatitis     Lumbar back pain     Acute right-sided low back pain without sciatica     Benign essential hypertension     TIA (transient ischemic attack)     Paroxysmal atrial fibrillation (H)     Cardiomyopathy (H)     Aortic root dilatation (H)     Peripheral neuropathy (H)     Past Surgical History:   Procedure Laterality Date     CHOLECYSTECTOMY       ESOPHAGOSCOPY, GASTROSCOPY, DUODENOSCOPY (EGD), COMBINED N/A 12/26/2016    Procedure: COMBINED ESOPHAGOSCOPY, GASTROSCOPY, DUODENOSCOPY (EGD);  Surgeon: Nasim Louis MD;  Location:  GI     GENITOURINARY SURGERY      KIDNEY STONE  REMOVAL X 4     HC UGI ENDOSCOPY W EUS N/A 12/29/2016    Procedure: COMBINED ENDOSCOPIC ULTRASOUND, ESOPHAGOSCOPY, GASTROSCOPY, DUODENOSCOPY (EGD);  Surgeon: Nasim Louis MD;  Location:  GI     HERNIA REPAIR       LASER HOLMIUM LITHOTRIPSY URETER(S), INSERT STENT, COMBINED  3/2/2012    Procedure:COMBINED CYSTOSCOPY, URETEROSCOPY, LASER HOLMIUM LITHOTRIPSY URETER(S), INSERT STENT; CYSTOSCOPY, RIGHT RETROGRADES, RIGHT URETEROSCOPY, HOLMIUM LASER, RIGHT STENT PLACEMENT; Surgeon:ANJELICA LEÓN; Location: OR     ROTATOR CUFF REPAIR RT/LT         Social History   Substance Use Topics     Smoking status: Former Smoker     Packs/day: 1.00     Years: 30.00     Types: Cigarettes     Quit date: 1/1/1999     Smokeless tobacco: Never Used     Alcohol use 4.2 oz/week     7 Standard drinks or equivalent per week      Comment: 1 drink per week     Family History   Problem Relation Age of Onset     Breast Cancer Mother      Heart Failure Father 81         Current Outpatient Prescriptions   Medication Sig Dispense Refill     DULoxetine (CYMBALTA) 30 MG EC capsule Take 1 capsule (30 mg) by mouth daily 90 capsule 3     cephALEXin (KEFLEX) 500 MG capsule Take 1 capsule (500 mg) by mouth 3 times daily 30 capsule 0     metoprolol (TOPROL-XL) 50 MG 24 hr tablet Take 1 tablet (50 mg) by mouth daily 90 tablet 3     lisinopril (PRINIVIL/ZESTRIL) 2.5 MG tablet Take 1 tablet (2.5 mg) by mouth daily 90 tablet 0     omeprazole (PRILOSEC) 40 MG capsule Take 1 capsule (40 mg) by mouth daily Take 30-60 minutes before a meal. 90 capsule 3     tamsulosin (FLOMAX) 0.4 MG capsule Take 1 capsule (0.4 mg) by mouth daily 90 capsule 3     aspirin EC 81 MG EC tablet Take 1 tablet (81 mg) by mouth daily 30 tablet 0     HYDROcodone-acetaminophen (NORCO) 5-325 MG per tablet Take 1-2 tablets by mouth every 4 hours as needed for moderate to severe pain Reported on 3/7/2017       glipiZIDE (GLIPIZIDE XL) 10 MG 24 hr tablet Take 1 tablet (10  "mg) by mouth daily 90 tablet 3     METFORMIN HCL PO Take 1,000 mg by mouth 2 times daily (with meals)       ATORVASTATIN CALCIUM PO Take 80 mg by mouth daily       blood glucose monitoring (NO BRAND SPECIFIED) test strip Use to test blood sugar three times daily or as directed. 300 each 3     order for DME Equipment being ordered: True Matrix Blood Glucose meter. 1 Device 0     Psyllium (METAMUCIL PO) Take 1 packet by mouth daily        Clopidogrel Bisulfate, [id number], (PLAVIX PO) Take 75 mg by mouth daily        [DISCONTINUED] DULoxetine (CYMBALTA) 30 MG EC capsule Take 30 mg by mouth daily       Allergies   Allergen Reactions     Oxycodone Other (See Comments)     \"TERRIBLE SWEATING\"     Sulfa Drugs      Tetracycline        Reviewed and updated as needed this visit by clinical staff       Reviewed and updated as needed this visit by Provider         ROS:  He has a large growth on his left ear that recently has been bleeding frequently, but has been present for years    He denies chest pains, dyspnea, palpitations, edema, cough, sputum production, abdominal pain, nausea or vomiting    He saw cardiology re his cardiomyopathy and a nuclear medicine stress test was ordered    OBJECTIVE:                                                    /53  Pulse 59  Temp 97.4  F (36.3  C) (Oral)  Resp 10  Ht 5' 5.5\" (1.664 m)  Wt 161 lb (73 kg)  SpO2 98%  BMI 26.38 kg/m2  Body mass index is 26.38 kg/(m^2).  GENERAL: healthy, alert and no distress  NECK: no adenopathy, no asymmetry, masses, or scars and thyroid normal to palpation  RESP: lungs clear to auscultation - no rales, rhonchi or wheezes  CV: regular rate and rhythm, normal S1 S2, no S3 or S4, no murmur, click or rub, no peripheral edema and peripheral pulses strong  ABDOMEN: soft, nontender, no hepatosplenomegaly, no masses and bowel sounds normal  MS: The left foot is wrapped in a dressing by his podiatrist this AM after debridement   NEURO: He is hard of " hearing and has memory problems, he walks with cane   PSYCH: affect normal/bright and appearance well groomed  SKIN:  Verrucous lesion left auricle consistent with wart vs. Scc     Diagnostic Test Results:  Results for orders placed or performed in visit on 04/21/17   Hemoglobin A1c   Result Value Ref Range    Hemoglobin A1C 8.3 (H) 4.3 - 6.0 %        ASSESSMENT/PLAN:                                                      1. Type 2 diabetes mellitus with diabetic peripheral angiopathy without gangrene, without long-term current use of insulin (H)  Given his slow healing foot ulcer, diabetes control is important  We should try to get his A1c less than 8 at least  His recent pancreatitis limits available oral and other non insulin agents  Try stopping long acting glipizide and increasing total glipizide dose from 10mg daily to 10mg twice daily in short acting form   - Hemoglobin A1c in 3 months     2. Benign essential hypertension  Well controlled     3. Polyneuropathy associated with underlying disease (H)  Continue cymbalta at current dose   - DULoxetine (CYMBALTA) 30 MG EC capsule; Take 1 capsule (30 mg) by mouth daily  Dispense: 90 capsule; Refill: 3    4. Skin lesion  I think Dr. Sinclair from ENT could excise this lesion without difficulty, now that it is bleeding we should remove it to exclude SCC   - OTOLARYNGOLOGY REFERRAL    FUTURE APPOINTMENTS:       - He will need an A1c in 3 months; continue follow up with podiatry for foot ulcer as directed; see Dr. Sinclair as soon as appointment is avaialble    Matt Morrissey MD  [facility]

## 2017-04-21 NOTE — MR AVS SNAPSHOT
After Visit Summary   4/21/2017    Dustin Pearce    MRN: [id number]           Patient Information     Date Of Birth          1/31/1928        Visit Information        Provider Department      4/21/2017 3:00 PM Matt Morrissey MD [facility]        Today's Diagnoses     Type 2 diabetes mellitus with diabetic peripheral angiopathy without gangrene, without long-term current use of insulin (H)    -  1    Benign essential hypertension        Polyneuropathy associated with underlying disease (H)        Skin lesion           Follow-ups after your visit        Additional Services     OTOLARYNGOLOGY REFERRAL       Your provider has referred you to: N: [facility] [phone]   [email or web address]    Dr. Amilcar Sinclair     Please be aware that coverage of these services is subject to the terms and limitations of your health insurance plan.  Call member services at your health plan with any benefit or coverage questions.      Please bring the following with you to your appointment:    (1) Any X-Rays, CTs or MRIs which have been performed.  Contact the facility where they were done to arrange for  prior to your scheduled appointment.   (2) List of current medications  (3) This referral request   (4) Any documents/labs given to you for this referral                  Follow-up notes from your care team     Return in about 3 months (around 7/21/2017) for Routine Visit.      Who to contact     If you have questions or need follow up information about today's clinic visit or your schedule please contact [facility] directly at [phone].  Normal or non-critical lab and imaging results will be communicated to you by MyChart, letter or phone within 4 business days after the clinic has received the results. If you do not hear from us within 7 days, please contact the clinic through MyChart or phone. If you have a critical or abnormal lab  "result, we will notify you by phone as soon as possible.  Submit refill requests through [employer] or call your pharmacy and they will forward the refill request to us. Please allow 3 business days for your refill to be completed.          Additional Information About Your Visit        Care EveryWhere ID     This is your Care EveryWhere ID. This could be used by other organizations to access your [facility] medical records  [id number]        Your Vitals Were     Pulse Temperature Respirations Height Pulse Oximetry BMI (Body Mass Index)    59 97.4  F (36.3  C) (Oral) 10 5' 5.5\" (1.664 m) 98% 26.38 kg/m2       Blood Pressure from Last 3 Encounters:   04/21/17 131/53   03/07/17 126/82   02/24/17 102/60    Weight from Last 3 Encounters:   04/21/17 161 lb (73 kg)   03/07/17 158 lb 12.8 oz (72 kg)   02/24/17 157 lb (71.2 kg)              We Performed the Following     Hemoglobin A1c     OTOLARYNGOLOGY REFERRAL          Where to get your medicines      These medications were sent to [id number] - [facility] [address] [facility]  [address] [place] [facility] [place] [address]    Hours:  24-hours Phone:  [phone]     DULoxetine 30 MG EC capsule          Primary Care Provider Office Phone # Fax #    Matt Morrissey [phone]       [facility] [address]  [facility] [address]        Thank you!     Thank you for choosing [facility]  for your care. Our goal is always to provide you with excellent care. Hearing back from our patients is one way we can continue to improve our services. Please take a few minutes to complete the written survey that you may receive in the mail after your visit with us. Thank you!             Your Updated Medication List - Protect others around you: Learn how to safely use, store and throw away your medicines at www.disposemymeds.org.          This list is accurate as of: 4/21/17  3:38 PM.  Always use your most " recent med list.                   Brand Name Dispense Instructions for use    aspirin 81 MG EC tablet     30 tablet    Take 1 tablet (81 mg) by mouth daily       ATORVASTATIN CALCIUM PO      Take 80 mg by mouth daily       blood glucose monitoring test strip    no brand specified    300 each    Use to test blood sugar three times daily or as directed.       cephALEXin 500 MG capsule    KEFLEX    30 capsule    Take 1 capsule (500 mg) by mouth 3 times daily       DULoxetine 30 MG EC capsule    CYMBALTA    90 capsule    Take 1 capsule (30 mg) by mouth daily       glipiZIDE 10 MG 24 hr tablet    glipiZIDE XL    90 tablet    Take 1 tablet (10 mg) by mouth daily       HYDROcodone-acetaminophen 5-325 MG per tablet    NORCO     Take 1-2 tablets by mouth every 4 hours as needed for moderate to severe pain Reported on 3/7/2017       lisinopril 2.5 MG tablet    PRINIVIL/Zestril    90 tablet    Take 1 tablet (2.5 mg) by mouth daily       METAMUCIL PO      Take 1 packet by mouth daily       METFORMIN HCL PO      Take 1,000 mg by mouth 2 times daily (with meals)       metoprolol 50 MG 24 hr tablet    TOPROL-XL    90 tablet    Take 1 tablet (50 mg) by mouth daily       omeprazole 40 MG capsule    priLOSEC    90 capsule    Take 1 capsule (40 mg) by mouth daily Take 30-60 minutes before a meal.       order for DME     1 Device    Equipment being ordered: [employer] Blood Glucose meter.       PLAVIX PO      Take 75 mg by mouth daily       tamsulosin 0.4 MG capsule    FLOMAX    90 capsule    Take 1 capsule (0.4 mg) by mouth daily

## 2017-04-22 NOTE — PROGRESS NOTES
The following letter pertains to your most recent diagnostic tests:    Your A1c has increased to higher than you goal A1c of that less than 8.  To help your left foot ulcer have the best chance of healing, we should try to get your sugars better controlled.  I recommend that you stop taking glipizide XL 10mg tablets and that you replace glipizide XL with immediate release glipizide.  I would like you to take 10mg of immediate release glipizide twice daily with breakfast and dinner.  I have sent a new prescritpion for glipizide immediate release tablets to your pharmacy.  If your sugar drops below 90 after this medication change or you have symptoms of low blood sugar, then please inform me.  We need to recheck your hemoglobin A1c in 3 months.              Follow up:  Office visit appointment with A1c in 3 months or return sooner as needed.        Sincerely,    Dr. Morrissey

## 2017-04-24 ENCOUNTER — TELEPHONE (OUTPATIENT)
Dept: FAMILY MEDICINE | Facility: CLINIC | Age: 82
End: 2017-04-24

## 2017-04-24 NOTE — TELEPHONE ENCOUNTER
Notes Recorded by Onel Valencia CMA on 4/24/2017 at 6:17 PM  I sent letter but also left message for the patient to call back on medication change.    Ty Valencia CMA  ------    Notes Recorded by Matt Morrissey MD on 4/21/2017 at 7:50 PM  The following letter pertains to your most recent diagnostic tests:    Your A1c has increased to higher than you goal A1c of that less than 8.  To help your left foot ulcer have the best chance of healing, we should try to get your sugars better controlled.  I recommend that you stop taking glipizide XL 10mg tablets and that you replace glipizide XL with immediate release glipizide.  I would like you to take 10mg of immediate release glipizide twice daily with breakfast and dinner.  I have sent a new prescritpion for glipizide immediate release tablets to your pharmacy.  If your sugar drops below 90 after this medication change or you have symptoms of low blood sugar, then please inform me.  We need to recheck your hemoglobin A1c in 3 months.              Follow up:  Office visit appointment with A1c in 3 months or return sooner as needed.        Sincerely,    Dr. Morrissey

## 2017-04-24 NOTE — LETTER
[facility]  [address] [facility]  [address]  [place]  [address]  Tel: [phone]    April 24, 2017    Dustin Pearce  [address] [facility] [address]        Dear Mr. Pearce,    The following letter pertains to your most recent diagnostic tests:     Your A1c has increased to higher than you goal A1c of that less than 8.  To help your left foot ulcer have the best chance of healing, we should try to get your sugars better controlled.  I recommend that you stop taking glipizide XL 10mg tablets and that you replace glipizide XL with immediate release glipizide.  I would like you to take 10mg of immediate release glipizide twice daily with breakfast and dinner.  I have sent a new prescritpion for glipizide immediate release tablets to your pharmacy.  If your sugar drops below 90 after this medication change or you have symptoms of low blood sugar, then please inform me.  We need to recheck your hemoglobin A1c in 3 months.         Follow up:  Office visit appointment with A1c in 3 months or return sooner as needed.      Sincerely,    Matt Morrissey MD/[facility]          Enclosure: Lab Results

## 2017-04-27 ENCOUNTER — TRANSFERRED RECORDS (OUTPATIENT)
Dept: HEALTH INFORMATION MANAGEMENT | Facility: CLINIC | Age: 82
End: 2017-04-27

## 2017-04-28 ENCOUNTER — HOSPITAL PATHOLOGY (OUTPATIENT)
Dept: OTHER | Facility: CLINIC | Age: 82
End: 2017-04-28

## 2017-04-30 ENCOUNTER — TRANSFERRED RECORDS (OUTPATIENT)
Dept: HEALTH INFORMATION MANAGEMENT | Facility: CLINIC | Age: 82
End: 2017-04-30

## 2017-05-01 ENCOUNTER — TELEPHONE (OUTPATIENT)
Dept: FAMILY MEDICINE | Facility: CLINIC | Age: 82
End: 2017-05-01

## 2017-05-01 LAB — COPATH REPORT: NORMAL

## 2017-05-01 NOTE — TELEPHONE ENCOUNTER
Medication states receipt confirmed by pharmacy. Spoke with Pharmacy staff and they never received rx. Phoned in and agree to contact patient with refill

## 2017-05-01 NOTE — TELEPHONE ENCOUNTER
Reason for Call:  Other prescription    Detailed comments: glipiZIDE (GLUCOTROL) 10 MG tablet  The pharmacy said they never received the request    Phone Number Patient can be reached at: Home number on file [phone] (home)    Best Time: anytime    Can we leave a detailed message on this number? YES    Call taken on 5/1/2017 at 11:57 AM by Aurora Ryan

## 2017-05-20 DIAGNOSIS — I25.10 CORONARY ARTERY DISEASE INVOLVING NATIVE CORONARY ARTERY OF NATIVE HEART WITHOUT ANGINA PECTORIS: ICD-10-CM

## 2017-05-20 NOTE — TELEPHONE ENCOUNTER
Pending Prescriptions:                       Disp   Refills    lisinopril (PRINIVIL/ZESTRIL) 2.5 MG tabl*90 tab*0            Sig: TAKE 1 TABLET(2.5 MG) BY MOUTH DAILY          Last Written Prescription Date: 1/27/17  Last Fill Quantity: 90, # refills: 0  Last Office Visit with [facility], [facility] or [facility] prescribing provider: 4/21/17 [place]  Next 5 appointments (look out 90 days)     Jul 24, 2017  3:00 PM CDT   Office Visit with Matt Morrissey MD   [facility] ([facility])    [address] [facility] [address]   [phone]                   Potassium   Date Value Ref Range Status   01/20/2017 4.2 3.4 - 5.3 mmol/L Final     Creatinine   Date Value Ref Range Status   01/20/2017 0.73 0.66 - 1.25 mg/dL Final     BP Readings from Last 3 Encounters:   04/21/17 131/53   03/07/17 126/82   02/24/17 102/60     Marli Stinson RT(R)

## 2017-05-22 RX ORDER — LISINOPRIL 2.5 MG/1
TABLET ORAL
Qty: 90 TABLET | Refills: 1 | Status: SHIPPED | OUTPATIENT
Start: 2017-05-22 | End: 2017-05-31

## 2017-05-22 NOTE — TELEPHONE ENCOUNTER
Prescription approved per [facility] Refill Protocol.  Portia Hernandez RN

## 2017-05-31 ENCOUNTER — HOSPITAL ENCOUNTER (INPATIENT)
Facility: CLINIC | Age: 82
LOS: 9 days | Discharge: NURSING, REHAB OR LONG-TERM CARE | DRG: 617 | End: 2017-06-09
Attending: EMERGENCY MEDICINE | Admitting: INTERNAL MEDICINE
Payer: MEDICARE

## 2017-05-31 ENCOUNTER — APPOINTMENT (OUTPATIENT)
Dept: GENERAL RADIOLOGY | Facility: CLINIC | Age: 82
DRG: 617 | End: 2017-05-31
Attending: EMERGENCY MEDICINE
Payer: MEDICARE

## 2017-05-31 ENCOUNTER — TRANSFERRED RECORDS (OUTPATIENT)
Dept: HEALTH INFORMATION MANAGEMENT | Facility: CLINIC | Age: 82
End: 2017-05-31

## 2017-05-31 DIAGNOSIS — L03.116 CELLULITIS OF LEFT LOWER EXTREMITY: ICD-10-CM

## 2017-05-31 DIAGNOSIS — L08.9 LEFT FOOT INFECTION: Primary | ICD-10-CM

## 2017-05-31 DIAGNOSIS — M79.89 SWELLING OF LIMB: ICD-10-CM

## 2017-05-31 DIAGNOSIS — E11.51 TYPE 2 DIABETES MELLITUS WITH DIABETIC PERIPHERAL ANGIOPATHY WITHOUT GANGRENE, WITHOUT LONG-TERM CURRENT USE OF INSULIN (H): ICD-10-CM

## 2017-05-31 PROBLEM — L97.509 DIABETIC FOOT ULCER (H): Status: ACTIVE | Noted: 2017-05-31

## 2017-05-31 PROBLEM — E11.621 DIABETIC FOOT ULCER (H): Status: ACTIVE | Noted: 2017-05-31

## 2017-05-31 LAB
ANION GAP SERPL CALCULATED.3IONS-SCNC: 6 MMOL/L (ref 3–14)
BUN SERPL-MCNC: 15 MG/DL (ref 7–30)
CALCIUM SERPL-MCNC: 8.8 MG/DL (ref 8.5–10.1)
CHLORIDE SERPL-SCNC: 103 MMOL/L (ref 94–109)
CO2 SERPL-SCNC: 30 MMOL/L (ref 20–32)
CREAT SERPL-MCNC: 0.62 MG/DL (ref 0.66–1.25)
CRP SERPL-MCNC: 21.2 MG/L (ref 0–8)
ERYTHROCYTE [DISTWIDTH] IN BLOOD BY AUTOMATED COUNT: 13.7 % (ref 10–15)
ERYTHROCYTE [SEDIMENTATION RATE] IN BLOOD BY WESTERGREN METHOD: 43 MM/H (ref 0–20)
GFR SERPL CREATININE-BSD FRML MDRD: ABNORMAL ML/MIN/1.7M2
GLUCOSE BLDC GLUCOMTR-MCNC: 178 MG/DL (ref 70–99)
GLUCOSE SERPL-MCNC: 249 MG/DL (ref 70–99)
HCT VFR BLD AUTO: 38.5 % (ref 40–53)
HGB BLD-MCNC: 12.4 G/DL (ref 13.3–17.7)
MCH RBC QN AUTO: 30.8 PG (ref 26.5–33)
MCHC RBC AUTO-ENTMCNC: 32.2 G/DL (ref 31.5–36.5)
MCV RBC AUTO: 96 FL (ref 78–100)
PLATELET # BLD AUTO: 192 10E9/L (ref 150–450)
POTASSIUM SERPL-SCNC: 4.4 MMOL/L (ref 3.4–5.3)
RBC # BLD AUTO: 4.03 10E12/L (ref 4.4–5.9)
SODIUM SERPL-SCNC: 139 MMOL/L (ref 133–144)
WBC # BLD AUTO: 9.5 10E9/L (ref 4–11)

## 2017-05-31 PROCEDURE — 96365 THER/PROPH/DIAG IV INF INIT: CPT

## 2017-05-31 PROCEDURE — 99223 1ST HOSP IP/OBS HIGH 75: CPT | Mod: AI | Performed by: INTERNAL MEDICINE

## 2017-05-31 PROCEDURE — 85652 RBC SED RATE AUTOMATED: CPT | Performed by: EMERGENCY MEDICINE

## 2017-05-31 PROCEDURE — 86140 C-REACTIVE PROTEIN: CPT | Performed by: EMERGENCY MEDICINE

## 2017-05-31 PROCEDURE — A9270 NON-COVERED ITEM OR SERVICE: HCPCS | Mod: GY | Performed by: INTERNAL MEDICINE

## 2017-05-31 PROCEDURE — 87040 BLOOD CULTURE FOR BACTERIA: CPT | Performed by: EMERGENCY MEDICINE

## 2017-05-31 PROCEDURE — 00000146 ZZHCL STATISTIC GLUCOSE BY METER IP

## 2017-05-31 PROCEDURE — 80048 BASIC METABOLIC PNL TOTAL CA: CPT | Performed by: EMERGENCY MEDICINE

## 2017-05-31 PROCEDURE — 96375 TX/PRO/DX INJ NEW DRUG ADDON: CPT

## 2017-05-31 PROCEDURE — 25000132 ZZH RX MED GY IP 250 OP 250 PS 637: Mod: GY | Performed by: INTERNAL MEDICINE

## 2017-05-31 PROCEDURE — 99285 EMERGENCY DEPT VISIT HI MDM: CPT | Mod: 25

## 2017-05-31 PROCEDURE — 73630 X-RAY EXAM OF FOOT: CPT | Mod: LT

## 2017-05-31 PROCEDURE — 25000128 H RX IP 250 OP 636: Performed by: EMERGENCY MEDICINE

## 2017-05-31 PROCEDURE — 12000000 ZZH R&B MED SURG/OB

## 2017-05-31 PROCEDURE — 85027 COMPLETE CBC AUTOMATED: CPT | Performed by: EMERGENCY MEDICINE

## 2017-05-31 RX ORDER — PIPERACILLIN SODIUM, TAZOBACTAM SODIUM 3; .375 G/15ML; G/15ML
3.38 INJECTION, POWDER, LYOPHILIZED, FOR SOLUTION INTRAVENOUS EVERY 6 HOURS
Status: DISCONTINUED | OUTPATIENT
Start: 2017-06-01 | End: 2017-06-07

## 2017-05-31 RX ORDER — GLIPIZIDE 10 MG/1
10 TABLET ORAL
Status: DISCONTINUED | OUTPATIENT
Start: 2017-06-01 | End: 2017-06-09 | Stop reason: HOSPADM

## 2017-05-31 RX ORDER — TAMSULOSIN HYDROCHLORIDE 0.4 MG/1
0.4 CAPSULE ORAL DAILY
Status: DISCONTINUED | OUTPATIENT
Start: 2017-06-01 | End: 2017-06-09 | Stop reason: HOSPADM

## 2017-05-31 RX ORDER — CLOPIDOGREL BISULFATE 75 MG/1
75 TABLET ORAL DAILY
Status: DISCONTINUED | OUTPATIENT
Start: 2017-06-01 | End: 2017-06-02

## 2017-05-31 RX ORDER — DEXTROSE MONOHYDRATE 25 G/50ML
25-50 INJECTION, SOLUTION INTRAVENOUS
Status: DISCONTINUED | OUTPATIENT
Start: 2017-05-31 | End: 2017-06-09 | Stop reason: HOSPADM

## 2017-05-31 RX ORDER — ONDANSETRON 4 MG/1
4 TABLET, ORALLY DISINTEGRATING ORAL EVERY 6 HOURS PRN
Status: DISCONTINUED | OUTPATIENT
Start: 2017-05-31 | End: 2017-06-09 | Stop reason: HOSPADM

## 2017-05-31 RX ORDER — IPRATROPIUM BROMIDE 21 UG/1
2 SPRAY, METERED NASAL EVERY 12 HOURS
Status: DISCONTINUED | OUTPATIENT
Start: 2017-05-31 | End: 2017-06-09 | Stop reason: HOSPADM

## 2017-05-31 RX ORDER — CEFAZOLIN SODIUM 1 G/50ML
1250 SOLUTION INTRAVENOUS ONCE
Status: DISCONTINUED | OUTPATIENT
Start: 2017-05-31 | End: 2017-06-02

## 2017-05-31 RX ORDER — ONDANSETRON 2 MG/ML
4 INJECTION INTRAMUSCULAR; INTRAVENOUS EVERY 6 HOURS PRN
Status: DISCONTINUED | OUTPATIENT
Start: 2017-05-31 | End: 2017-06-09 | Stop reason: HOSPADM

## 2017-05-31 RX ORDER — DULOXETIN HYDROCHLORIDE 30 MG/1
30 CAPSULE, DELAYED RELEASE ORAL DAILY
Status: DISCONTINUED | OUTPATIENT
Start: 2017-06-01 | End: 2017-06-09 | Stop reason: HOSPADM

## 2017-05-31 RX ORDER — NALOXONE HYDROCHLORIDE 0.4 MG/ML
.1-.4 INJECTION, SOLUTION INTRAMUSCULAR; INTRAVENOUS; SUBCUTANEOUS
Status: DISCONTINUED | OUTPATIENT
Start: 2017-05-31 | End: 2017-06-09 | Stop reason: HOSPADM

## 2017-05-31 RX ORDER — IPRATROPIUM BROMIDE 21 UG/1
1 SPRAY, METERED NASAL EVERY 12 HOURS PRN
Status: ON HOLD | COMMUNITY
End: 2018-05-25

## 2017-05-31 RX ORDER — METOPROLOL SUCCINATE 50 MG/1
50 TABLET, EXTENDED RELEASE ORAL DAILY
Status: DISCONTINUED | OUTPATIENT
Start: 2017-06-01 | End: 2017-06-09 | Stop reason: HOSPADM

## 2017-05-31 RX ORDER — LIDOCAINE 40 MG/G
CREAM TOPICAL
Status: DISCONTINUED | OUTPATIENT
Start: 2017-05-31 | End: 2017-06-06

## 2017-05-31 RX ORDER — ATORVASTATIN CALCIUM 40 MG/1
80 TABLET, FILM COATED ORAL DAILY
Status: DISCONTINUED | OUTPATIENT
Start: 2017-06-01 | End: 2017-06-09 | Stop reason: HOSPADM

## 2017-05-31 RX ORDER — LISINOPRIL 2.5 MG/1
2.5 TABLET ORAL DAILY
Status: DISCONTINUED | OUTPATIENT
Start: 2017-06-01 | End: 2017-06-03

## 2017-05-31 RX ORDER — NICOTINE POLACRILEX 4 MG
15-30 LOZENGE BUCCAL
Status: DISCONTINUED | OUTPATIENT
Start: 2017-05-31 | End: 2017-06-09 | Stop reason: HOSPADM

## 2017-05-31 RX ORDER — HYDROCODONE BITARTRATE AND ACETAMINOPHEN 5; 325 MG/1; MG/1
1-2 TABLET ORAL EVERY 4 HOURS PRN
Status: DISCONTINUED | OUTPATIENT
Start: 2017-05-31 | End: 2017-06-09 | Stop reason: HOSPADM

## 2017-05-31 RX ORDER — CEFAZOLIN SODIUM 1 G/50ML
1250 SOLUTION INTRAVENOUS EVERY 24 HOURS
Status: DISCONTINUED | OUTPATIENT
Start: 2017-06-01 | End: 2017-06-01

## 2017-05-31 RX ORDER — ACETAMINOPHEN 325 MG/1
650 TABLET ORAL EVERY 4 HOURS PRN
Status: DISCONTINUED | OUTPATIENT
Start: 2017-05-31 | End: 2017-06-09 | Stop reason: HOSPADM

## 2017-05-31 RX ORDER — ASPIRIN 81 MG/1
81 TABLET ORAL DAILY
Status: DISCONTINUED | OUTPATIENT
Start: 2017-06-01 | End: 2017-06-09 | Stop reason: HOSPADM

## 2017-05-31 RX ORDER — PIPERACILLIN SODIUM, TAZOBACTAM SODIUM 3; .375 G/15ML; G/15ML
3.38 INJECTION, POWDER, LYOPHILIZED, FOR SOLUTION INTRAVENOUS ONCE
Status: COMPLETED | OUTPATIENT
Start: 2017-05-31 | End: 2017-05-31

## 2017-05-31 RX ADMIN — PIPERACILLIN SODIUM,TAZOBACTAM SODIUM 3.38 G: 3; .375 INJECTION, POWDER, FOR SOLUTION INTRAVENOUS at 19:46

## 2017-05-31 RX ADMIN — HYDROCODONE BITARTRATE AND ACETAMINOPHEN 2 TABLET: 5; 325 TABLET ORAL at 22:53

## 2017-05-31 RX ADMIN — METFORMIN HYDROCHLORIDE 1000 MG: 500 TABLET, FILM COATED ORAL at 22:49

## 2017-05-31 RX ADMIN — IPRATROPIUM BROMIDE 2 SPRAY: 21 SPRAY NASAL at 22:51

## 2017-05-31 ASSESSMENT — ENCOUNTER SYMPTOMS
NAUSEA: 0
VOMITING: 0
WOUND: 1
FEVER: 0
COLOR CHANGE: 1

## 2017-05-31 NOTE — IP AVS SNAPSHOT
"          [place] [address] [facility]: [phone]                                              INTERAGENCY TRANSFER FORM - LAB / IMAGING / EKG / EMG RESULTS   2017                    Hospital Admission Date: 2017  SID AGUIAR   : 1928  Sex: Male        Attending Provider: (none)    Allergies:  Oxycodone, Sulfa Drugs, Tetracycline    Infection:  None   Service:  SURGERY    Ht:  1.651 m (5' 5\")   Wt:  62.3 kg (137 lb 5.6 oz)   Admission Wt:  73 kg (161 lb)    BMI:  22.86 kg/m 2   BSA:  1.69 m 2            Patient PCP Information     Provider PCP Type    Matt Morrissey MD General    Matt Morrissey MD Internal Medicine         Lab Results - 3 Days      Glucose by meter [[id number]] (Abnormal)  Resulted: 17 1206, Result status: Final result    Ordering provider: Moe Kirk DPM  17 1201 Resulting lab: POINT OF CARE TEST, GLUCOSE    Specimen Information    Type Source Collected On     17 1201          Components       Value Reference Range Flag Lab   Glucose 136 70 - 99 mg/dL H 170            Wound Culture Aerobic Bacterial [[id number]]  Resulted: 17 0934, Result status: Final result    Ordering provider: Nabil Ann DPM  17 1847 Resulting lab: INFECT[facility] DISE[facility]ST[facility] LAB[facility]AT[facility]    Specimen Information    Type Source Collected On   Wound Foot, Left 17 1846          Components       Value Reference Range Flag Lab   Specimen Description Left Foot Wound SPECIMEN 1   75   Special Requests Specimen collected in eSwab transport (white cap)   75   Culture Micro No growth   225   Micro Report Status FINAL 2017   225            CRP inflammation [[id number]] (Abnormal)  Resulted: 17 0637, Result status: Final result    Ordering provider: Fuastino Aguilar MD  17 0001 Resulting lab: [facility]    Specimen Information    Type Source Collected On   Blood  17 0613          Components       " Value Reference Range Flag Lab   CRP Inflammation 93.4 0.0 - 8.0 mg/L H FrStHsLb            Basic metabolic panel [[id number]] (Abnormal)  Resulted: 06/08/17 0637, Result status: Final result    Ordering provider: Faustino Aguilar MD  06/08/17 0613 Resulting lab: [facility]    Specimen Information    Type Source Collected On     06/08/17 0613          Components       Value Reference Range Flag Lab   Sodium 138 133 - 144 mmol/L  FrStHsLb   Potassium 3.8 3.4 - 5.3 mmol/L  FrStHsLb   Chloride 104 94 - 109 mmol/L  FrStHsLb   Carbon Dioxide 28 20 - 32 mmol/L  FrStHsLb   Anion Gap 6 3 - 14 mmol/L  FrStHsLb   Glucose 141 70 - 99 mg/dL H FrStHsLb   Urea Nitrogen 12 7 - 30 mg/dL  FrStHsLb   Creatinine 0.62 0.66 - 1.25 mg/dL L FrStHsLb   GFR Estimate -- >60 mL/min/1.7m2  FrStHsLb   Result:         >90  Non  GFR Calc     GFR Estimate If Black -- >60 mL/min/1.7m2  FrStHsLb   Result:         >90   GFR Calc     Calcium 8.8 8.5 - 10.1 mg/dL  FrStHsLb   Result:              CBC with platelets [[id number]] (Abnormal)  Resulted: 06/08/17 0625, Result status: Final result    Ordering provider: Faustino Aguilar MD  06/08/17 0001 Resulting lab: [facility]    Specimen Information    Type Source Collected On   Blood  06/08/17 0613          Components       Value Reference Range Flag Lab   WBC 8.7 4.0 - 11.0 10e9/L  FrStHsLb   RBC Count 3.58 4.4 - 5.9 10e12/L L FrStHsLb   Hemoglobin 10.9 13.3 - 17.7 g/dL L FrStHsLb   Hematocrit 33.2 40.0 - 53.0 % L FrStHsLb   MCV 93 78 - 100 fl  FrStHsLb   MCH 30.4 26.5 - 33.0 pg  FrStHsLb   MCHC 32.8 31.5 - 36.5 g/dL  FrStHsLb   RDW 13.6 10.0 - 15.0 %  FrStHsLb   Platelet Count 187 150 - 450 10e9/L  FrStHsLb            Glucose by meter [[id number]] (Abnormal)  Resulted: 06/08/17 0241, Result status: Final result    Ordering provider: Moe Kirk DPM  06/08/17 0234 Resulting lab: POINT OF CARE TEST, GLUCOSE    Specimen  Information    Type Source Collected On     06/08/17 0234          Components       Value Reference Range Flag Lab   Glucose 173 70 - 99 mg/dL H 170            Glucose by meter [[id number]] (Abnormal)  Resulted: 06/07/17 2111, Result status: Final result    Ordering provider: Moe Kirk, ERICKA  06/07/17 2100 Resulting lab: POINT OF CARE TEST, GLUCOSE    Specimen Information    Type Source Collected On     06/07/17 2100          Components       Value Reference Range Flag Lab   Glucose 169 70 - 99 mg/dL H 170            Glucose by meter [[id number]] (Abnormal)  Resulted: 06/07/17 1706, Result status: Final result    Ordering provider: Moe Kirk, ERICKA  06/07/17 1702 Resulting lab: POINT OF CARE TEST, GLUCOSE    Specimen Information    Type Source Collected On     06/07/17 1702          Components       Value Reference Range Flag Lab   Glucose 145 70 - 99 mg/dL H 170            Anaerobic bacterial culture [[id number]]  Resulted: 06/07/17 1247, Result status: Preliminary result    Ordering provider: Nabil Ann DPM  06/06/17 1847 Resulting lab: INFECT[facility]NOST[facility] LAB[facility]    Specimen Information    Type Source Collected On   Wound Foot, Left 06/06/17 1846          Components       Value Reference Range Flag Lab   Specimen Description Left Foot Wound SPECIMEN 1   75   Special Requests Specimen collected in eSwab transport (white cap)   75   Culture Micro Culture negative monitoring continues   225   Micro Report Status Pending   225            Surgical pathology exam [[id number]]  Resulted: 06/07/17 1243, Result status: Final result    Ordering provider: Moe Kirk DPM  06/04/17 1108 Resulting lab: [facility]    Specimen Information    Type Source Collected On   Tissue Other 06/04/17 1106   Comment:  EVALUATE FOR OSTEOMYLITIS   Tissue Other 06/04/17 1109   Comment:  EVALUATE FOR OSTEOMYLITIS   Tissue Other 06/04/17 1112          Components       Value Reference Range Flag Lab  "  Copath Report --      Result:         Patient Name: SID AGUIAR  MR#: [id number]  Specimen #: [id number]  Collected: 6/4/2017  Received: 6/5/2017  Reported: 6/7/2017 12:41  Ordering Phy(s): MICHAEL KNIGHT    For improved result formatting, select 'View Enhanced Report Format'  under Linked Documents section.    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    GROSS:  A: The specimen labeled \"left fifth metatarsal proximal\" consists of 0.1  cm fragment of red-brown bone.  Entirely submitted.    B: The specim en of \"left fifth proximal phalanx\" consists of two  fragments of hemorrhagic bone measuring 2.5 x 1.0 and 2.5 x 1.5 cm  respectively.  Representative sections    C: The specimen \"left fifth toe\" consists of a resected toe measuring  2.5 x 1.5 cm.  The skin appears somewhat dusky in color but no  definitive skin lesions are apparent.  No sections are submitted.  (Dictated by: Dr. Marcio Tam 6/5/2017 09:59 AM)    MICROSCOPIC:  A. Microscopic performed    CPT Codes:  A: 13503-HH4, 82354-ZLG  B: 40243-VH5, 94379-BPC  C: 60008-AT    TESTING LAB LOCATION:  [address]  [id number] [phone]    COLLECTION SITE:  Client: [facility]  Location: [facility] (S)              Glucose by meter [[id number]] (Abnormal)  Resulted: 06/07/17 1221, Result status: Final result    Ordering provider: Michael Knight DPM  06/07/17 1215 Resulting " lab: POINT OF CARE TEST, GLUCOSE    Specimen Information    Type Source Collected On     06/07/17 1215          Components       Value Reference Range Flag Lab   Glucose 143 70 - 99 mg/dL H 170            CRP inflammation [[id number]] (Abnormal)  Resulted: 06/07/17 0713, Result status: Final result    Ordering provider: Faustino Aguilar MD  06/07/17 0551 Resulting lab: [facility]    Specimen Information    Type Source Collected On   Blood  06/07/17 0640          Components       Value Reference Range Flag Lab   CRP Inflammation 107.0 0.0 - 8.0 mg/L H FrStHsLb            Glucose by meter [[id number]] (Abnormal)  Resulted: 06/07/17 0711, Result status: Final result    Ordering provider: Moe Kirk DPM  06/07/17 0631 Resulting lab: POINT OF CARE TEST, GLUCOSE    Specimen Information    Type Source Collected On     06/07/17 0631          Components       Value Reference Range Flag Lab   Glucose 107 70 - 99 mg/dL H 170            CBC with platelets [[id number]] (Abnormal)  Resulted: 06/07/17 0655, Result status: Final result    Ordering provider: Faustino Aguilar MD  06/07/17 0551 Resulting lab: [facility]    Specimen Information    Type Source Collected On   Blood  06/07/17 0640          Components       Value Reference Range Flag Lab   WBC 7.0 4.0 - 11.0 10e9/L  FrStHsLb   RBC Count 3.44 4.4 - 5.9 10e12/L L FrStHsLb   Hemoglobin 10.3 13.3 - 17.7 g/dL L FrStHsLb   Hematocrit 32.0 40.0 - 53.0 % L FrStHsLb   MCV 93 78 - 100 fl  FrStHsLb   MCH 29.9 26.5 - 33.0 pg  FrStHsLb   MCHC 32.2 31.5 - 36.5 g/dL  FrStHsLb   RDW 13.6 10.0 - 15.0 %  FrStHsLb   Platelet Count 178 150 - 450 10e9/L  FrStHsLb            Glucose by meter [[id number]] (Abnormal)  Resulted: 06/07/17 0156, Result status: Final result    Ordering provider: Moe Kirk DPM  06/07/17 0148 Resulting lab: POINT OF CARE TEST, GLUCOSE    Specimen Information    Type Source Collected On     06/07/17 0141           Components       Value Reference Range Flag Lab   Glucose 122 70 - 99 mg/dL H 170            Gram stain [[id number]]  Resulted: 06/07/17 0018, Result status: Final result    Ordering provider: Nabil Ann DPM  06/06/17 1847 Resulting lab: MICRO RAPID TESTING LAB    Specimen Information    Type Source Collected On   Wound Foot, Left 06/06/17 1846          Components       Value Reference Range Flag Lab   Specimen Description Left Foot Wound SPECIMEN 1   75   Special Requests Specimen collected in eSwab transport (white cap)   75   Gram Stain --   226   Result:         No organisms seen  Many WBCs seen predominantly PMN's  Many RBCs seen     Micro Report Status FINAL 06/07/2017   226   Result:              Glucose by meter [[id number]] (Abnormal)  Resulted: 06/06/17 2146, Result status: Final result    Ordering provider: Moe Kirk DPM  06/06/17 2129 Resulting lab: POINT OF CARE TEST, GLUCOSE    Specimen Information    Type Source Collected On     06/06/17 2129          Components       Value Reference Range Flag Lab   Glucose 139 70 - 99 mg/dL H 170            Fluid Culture Aerobic Bacterial [[id number]] (Abnormal)  Resulted: 06/06/17 2053, Result status: Final result    Ordering provider: Moe Kirk DPM  06/04/17 1028 Resulting lab: [facility]    Specimen Information    Type Source Collected On   Fluid Other 06/04/17 1027          Components       Value Reference Range Flag Lab   Specimen Description Left Foot Abscess   75   Special Requests Specimen collected in eSwab transport (white cap)   75   Culture Micro Moderate growth Staphylococcus aureus  A 75   Micro Report Status FINAL 06/06/2017   75   Organism: Moderate growth Staphylococcus aureus   75            Glucose by meter [[id number]] (Abnormal)  Resulted: 06/06/17 1800, Result status: Final result    Ordering provider: Moe Kirk DPM  06/06/17 1632 Resulting lab: POINT OF CARE TEST, GLUCOSE     Specimen Information    Type Source Collected On     06/06/17 1632          Components       Value Reference Range Flag Lab   Glucose 109 70 - 99 mg/dL H 170            Potassium [[id number]]  Resulted: 06/06/17 1422, Result status: Final result    Ordering provider: Xiomara López MD  06/06/17 1343 Resulting lab: [facility]    Specimen Information    Type Source Collected On   Blood  06/06/17 1355          Components       Value Reference Range Flag Lab   Potassium 3.6 3.4 - 5.3 mmol/L  FrStHsLb            Glucose [[id number]] (Abnormal)  Resulted: 06/06/17 1422, Result status: Final result    Ordering provider: Xiomara López MD  06/06/17 1343 Resulting lab: [facility]    Specimen Information    Type Source Collected On   Blood  06/06/17 1355          Components       Value Reference Range Flag Lab   Glucose 140 70 - 99 mg/dL H FrStHsLb            Glucose by meter [[id number]] (Abnormal)  Resulted: 06/06/17 1256, Result status: Final result    Ordering provider: Meo Kirk DPM  06/06/17 1237 Resulting lab: POINT OF CARE TEST, GLUCOSE    Specimen Information    Type Source Collected On     06/06/17 1237          Components       Value Reference Range Flag Lab   Glucose 184 70 - 99 mg/dL H 170   Comment:  /RN Notified            Glucose by meter [[id number]] (Abnormal)  Resulted: 06/06/17 0756, Result status: Final result    Ordering provider: Moe Kirk DPM  06/06/17 0750 Resulting lab: POINT OF CARE TEST, GLUCOSE    Specimen Information    Type Source Collected On     06/06/17 0750          Components       Value Reference Range Flag Lab   Glucose 134 70 - 99 mg/dL H 170   Comment:  /RN Notified            Blood culture [[id number]]  Resulted: 06/06/17 0649, Result status: Final result    Ordering provider: Nadia Miller MD  05/31/17 4811 Resulting lab: [facility]    Specimen Information    Type Source Collected On    Blood Arm, Right 05/31/17 1930          Components       Value Reference Range Flag Lab   Specimen Description Blood Right Arm   79384   Special Requests Aerobic and anaerobic bottles received   06577   Culture Micro No growth   75   Micro Report Status FINAL 06/06/2017   75            Blood culture [[id number]]  Resulted: 06/06/17 0649, Result status: Final result    Ordering provider: Nadia Miller MD  05/31/17 1825 Resulting lab: [facility]    Specimen Information    Type Source Collected On   Blood Arm, Left 05/31/17 1905          Components       Value Reference Range Flag Lab   Specimen Description Blood Left Arm   78046   Special Requests Aerobic and anaerobic bottles received   36109   Culture Micro No growth   75   Micro Report Status FINAL 06/06/2017   75            Glucose by meter [[id number]] (Abnormal)  Resulted: 06/06/17 0211, Result status: Final result    Ordering provider: Moe Kirk DPM  06/06/17 0204 Resulting lab: POINT OF CARE TEST, GLUCOSE    Specimen Information    Type Source Collected On     06/06/17 0204          Components       Value Reference Range Flag Lab   Glucose 114 70 - 99 mg/dL H 170            Glucose by meter [[id number]] (Abnormal)  Resulted: 06/05/17 2151, Result status: Final result    Ordering provider: Moe Kirk DPM  06/05/17 2146 Resulting lab: POINT OF CARE TEST, GLUCOSE    Specimen Information    Type Source Collected On     06/05/17 2146          Components       Value Reference Range Flag Lab   Glucose 141 70 - 99 mg/dL H 170            Glucose by meter [[id number]]  Resulted: 06/05/17 1807, Result status: Final result    Ordering provider: Moe Kirk DPM  06/05/17 1730 Resulting lab: POINT OF CARE TEST, GLUCOSE    Specimen Information    Type Source Collected On     06/05/17 1730          Components       Value Reference Range Flag Lab   Glucose 80 70 - 99 mg/dL  170            Anaerobic bacterial  culture [[id number]]  Resulted: 06/05/17 1404, Result status: Preliminary result    Ordering provider: Moe Kirk DPM  06/04/17 1028 Resulting lab: [facility]    Specimen Information    Type Source Collected On   Fluid Other 06/04/17 1027          Components       Value Reference Range Flag Lab   Specimen Description Left Foot Abscess   FrStHsLb   Special Requests Specimen collected in eSwab transport (white cap)   75   Culture Micro Culture negative monitoring continues   225   Micro Report Status Pending   225            Anaerobic bacterial culture [[id number]]  Resulted: 06/05/17 1403, Result status: Preliminary result    Ordering provider: Moe Kirk DPM  06/04/17 1017 Resulting lab: INFECT[facility] DISE[facility]NO[facility] LABOR[facility]ORY    Specimen Information    Type Source Collected On   Tissue Other 06/04/17 1016          Components       Value Reference Range Flag Lab   Specimen Description Tissue Left Foot   75   Special Requests Specimen collected in surgery Received in anaerobic tubes.   75   Culture Micro Culture negative monitoring continues   225   Micro Report Status Pending   225            Glucose by meter [[id number]] (Abnormal)  Resulted: 06/05/17 1221, Result status: Final result    Ordering provider: Moe Kirk DPM  06/05/17 1138 Resulting lab: POINT OF CARE TEST, GLUCOSE    Specimen Information    Type Source Collected On     06/05/17 1138          Components       Value Reference Range Flag Lab   Glucose 200 70 - 99 mg/dL H 170   Comment:  /RN Notified            Tissue Culture Aerobic Bacterial [[id number]]  Resulted: 06/05/17 1204, Result status: Preliminary result    Ordering provider: Moe Kirk DPM  06/04/17 1017 Resulting lab: [facility] LAB[facility]    Specimen Information    Type Source Collected On   Tissue Other 06/04/17 1016          Components       Value Reference Range Flag Lab   Specimen Description Tissue  Left Foot   75   Culture Micro Culture negative monitoring continues   225   Micro Report Status Pending   225            Glucose by meter [[id number]] (Abnormal)  Resulted: 06/05/17 1126, Result status: Final result    Ordering provider: Moe Kirk DPM  06/05/17 0837 Resulting lab: POINT OF CARE TEST, GLUCOSE    Specimen Information    Type Source Collected On     06/05/17 0837          Components       Value Reference Range Flag Lab   Glucose 120 70 - 99 mg/dL H 170   Comment:  Dr/RN Notified            Testing Performed By     Lab - Abbreviation Name Director Address Valid Date Range    14 - FrStHsLb [facility] Unknown [address] [place] [address] 05/08/15 1057 - Present    75 - Unknown [facility] Unknown [address] [facility] [address] 01/15/15 1019 - Present    88 - Unknown [facility] Unknown Unknown 10/30/02 0000 - Present    170 - Unknown POINT OF CARE TEST, GLUCOSE Unknown Unknown 10/31/11 1114 - Present    225 - Unknown INF[facility] LABORATORY Unknown [address] [facility] [address] 12/19/14 0954 - Present    226 - Unknown MICRO RAPID TESTING LAB Unknown [address] [facility] [address] 12/19/14 0955 - Present    [address] - Unknown [facility] Unknown [address]  [place] [address] 07/23/15 1146 - Present            Unresulted Labs (24h ago through future)    Start       Ordered    06/07/17 0600  CRP inflammation  AM DRAW,   Routine      06/07/17 0551    06/07/17 0600  CBC with platelets  AM DRAW,   Routine     Comments:  Last Lab Result: Hemoglobin (g/dL)       Date                     Value                 06/01/2017               11.5 (L)         ----------    06/07/17 0551         Imaging Results - 3 Days      XR Finger Right G/E 2 Views [[id number]]  Resulted: 06/08/17 1536, Result status: Final result    Ordering provider: Faustino Aguilar MD  06/08/17 0819 Resulted by: Amish  Parker Dorado MD    Performed: 06/08/17 0923 - 06/08/17 0926 Resulting lab: RADIOLOGY RESULTS    Narrative:       XR FINGER RT G/E 2 VW 6/8/2017 9:26 AM    HISTORY: Fifth PIP joint swelling.    COMPARISON: None.    FINDINGS: No acute fracture or malalignment. 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      Impression:       IMPRESSION: Degenerative change without fracture.    PARKER WELLINGTON MD      XR Foot Left G/E 3 Views [[id number]]  Resulted: 06/08/17 0946, Result status: Final result    Ordering provider: Nabil Ann DPM  06/07/17 1034 Resulted by: Parker Wellington MD    Performed: 06/07/17 1143 - 06/07/17 1152 Resulting lab: RADIOLOGY RESULTS    Narrative:       XR FOOT LT G/E 3 VW 6/7/2017 11:52 AM    HISTORY: Postop check.    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 No new  abnormality.      Impression:       IMPRESSION: Stable postoperative change.    PARKER WELLINGTON MD      XR Foot Left G/E 3 Views [[id number]]  Resulted: 06/06/17 1006, Result status: Final result    Ordering provider: Moe Kirk DPM  06/05/17 0717 Resulted by: Leatha Menjivar MD    Performed: 06/05/17 0955 - 06/05/17 1010 Resulting lab: RADIOLOGY RESULTS    Narrative:       LEFT FOOT TWO VIEWS  6/5/2017 10:10 AM     HISTORY:  Postop.    COMPARISON: 5/31/2017.      Impression:       IMPRESSION: The fifth toe and the head and distal shaft of the fifth  metatarsal have been resected. Arterial calcifications.    LEATHA MENJIVAR MD      Testing Performed By     Lab - Abbreviation Name Director Address Valid Date Range    104 - Rad Rslts RADIOLOGY RESULTS Unknown Unknown 02/16/05 1553 - Present            Encounter-Level Documents:     There are no encounter-level documents.      Order-Level Documents:     There are no order-level documents.

## 2017-05-31 NOTE — IP AVS SNAPSHOT
[address] Special[facility] Unit    [address] [place] [address]    Phone:  [phone]                                       After Visit Summary   5/31/2017    Dustin Pearce    MRN: [id number]           After Visit Summary Signature Page     I have received my discharge instructions, and my questions have been answered. I have discussed any challenges I see with this plan with the nurse or doctor.    ..........................................................................................................................................  Patient/Patient Representative Signature      ..........................................................................................................................................  Patient Representative Print Name and Relationship to Patient    ..................................................               ................................................  Date                                            Time    ..........................................................................................................................................  Reviewed by Signature/Title    ...................................................              ..............................................  Date                                                            Time

## 2017-05-31 NOTE — IP AVS SNAPSHOT
MRN:[id number]                      After Visit Summary   5/31/2017    Dustin Pearce    MRN: [id number]           Thank you!     Thank you for choosing [facility] for your care. Our goal is always to provide you with excellent care. Hearing back from our patients is one way we can continue to improve our services. Please take a few minutes to complete the written survey that you may receive in the mail after you visit with us. Thank you!        Patient Information     Date Of Birth          1/31/1928        Designated Caregiver       Most Recent Value    Caregiver    Will someone help with your care after discharge? yes    Name of designated caregiver Halina    Phone number of caregiver [phone]    Caregiver address [address], [facility]       About your hospital stay     You were admitted on:  May 31, 2017 You last received care in the:  [place] [address] [facility]    You were discharged on:  June 9, 2017        Reason for your hospital stay       You were hospitalized for a left foot infection. You were started on antibiotics. You had evaluation for insufficient circulation in the affected extremity and this was treated with a procedure/stent. Subsequently had surgery to treat the infection with improvement.                  Who to Call     For medical emergencies, please call 911.  For non-urgent questions about your medical care, please call your primary care provider or clinic, [phone]  For questions related to your surgery, please call your surgery clinic        Attending Provider     Provider Specialty    Nadia Miller MD Emergency Medicine    [facility]       Primary Care Provider Office Phone # Fax #    Matt Morrissey [phone]      After Care Instructions     Activity       Your activity upon discharge: NWB LLE.            Activity       Your activity upon discharge:  Non weight bearing.             Diet       Follow this diet upon discharge: Orders Placed This Encounter      Snacks/Supplements Adult: Other - Please comment; yogurt; With Meals      Room Service      Combination Diet [id number] Calories: Moderate Consistent CHO (4-6 CHO units/meal)              Dressing       Change dressing every other day with dry gauze, kerlix roll, and ace bandage. Keep foot and dressing dry.            General info for SNF       Length of Stay Estimate: Short Term Care: Estimated # of Days <30  Condition at Discharge: Improving  Level of care:skilled   Rehabilitation Potential: Good  Admission H&P remains valid and up-to-date: Yes  Recent Chemotherapy: N/A  Use Nursing Home Standing Orders: Yes            Mantoux instructions       Give two-step Mantoux (PPD) Per Facility Policy Yes                  Follow-up Appointments     Follow Up and recommended labs and tests       Follow up with primary care provider, Matt Morrissey, within 7 days for hospital follow- up.  The following labs/tests are recommended: BMP. Surgical follow up per Podiatry.  Dr. Rodriguez's office will call for vascular f/up to be arranged.            Follow-up and recommended labs and tests        Follow up with Dr. Ann in 1 week from discharge from the hospital.                  Your next 10 appointments already scheduled     Jul 24, 2017  3:00 PM CDT   [facility]    [address] [address] [phone]           Bring a current list of meds and any records pertaining to this visit.  For Physicals, please bring immunization records and any forms needing to be filled out.  Please arrive 10 minutes early to complete paperwork.              Additional Services     Occupational Therapy Adult Consult       Evaluate and treat as clinically indicated.    Reason:  Weakness/deconditioning            Physical Therapy Adult Consult       Evaluate and treat as  "clinically indicated.    Reason:  weakness/deconditioning                  Pending Results     Date and Time Order Name Status Description    2017 1847 Anaerobic bacterial culture Preliminary     2017 1755 EKG 12-lead, tracing only Preliminary     2017 1028 Anaerobic bacterial culture Preliminary     2017 1017 Anaerobic bacterial culture Preliminary             Statement of Approval     Ordered          17 1222  I have reviewed and agree with all the recommendations and orders detailed in this document.  EFFECTIVE NOW     Approved and electronically signed by:  Faustino Aguilar MD             Admission Information     Date & Time Department Dept. Phone    [address] [place] [address] [facility] [phone]      Your Vitals Were     Blood Pressure Pulse Temperature Respirations Height Weight    150/65 (BP Location: Right arm) 72 98.4  F (36.9  C) (Oral) 16 1.651 m (5' 5\") 62.3 kg (137 lb 5.6 oz)    Pulse Oximetry BMI (Body Mass Index)                95% 22.86 kg/m2          MyChart Information     [employer] lets you send messages to your doctor, view your test results, renew your prescriptions, schedule appointments and more. To sign up, go to [email or web address] . Click on \"Log in\" on the left side of the screen, which will take you to the Welcome page. Then click on \"Sign up Now\" on the right side of the page.     You will be asked to enter the access code listed below, as well as some personal information. Please follow the directions to create your username and password.     Your access code is: [email or web address]  Expires: 2017 12:00 PM     Your access code will  in 90 days. If you need help or a new code, please call your [place] clinic or [phone].        Care EveryWhere ID     This is your Care EveryWhere ID. This could be used by other organizations to access your [place] medical records  [id number]           Review of your medicines      START taking  "       Dose / Directions    cephALEXin 500 MG capsule   Commonly known as:  KEFLEX   Used for:  Left foot infection, Cellulitis of left lower extremity        Dose:  500 mg   Take 1 capsule (500 mg) by mouth 4 times daily for 10 days   Quantity:  28 capsule   Refills:  0       naproxen 250 MG tablet   Commonly known as:  NAPROSYN   Used for:  Swelling of limb        Dose:  250 mg   Take 1 tablet (250 mg) by mouth 3 times daily (with meals) for 4 days   Quantity:  60 tablet   Refills:  0         CONTINUE these medicines which have NOT CHANGED        Dose / Directions    AMITRIPTYLINE HCL PO        Dose:  25 mg   Take 25 mg by mouth nightly as needed for sleep   Refills:  0       aspirin 81 MG EC tablet   Used for:  Transient cerebral ischemia, unspecified type        Dose:  81 mg   Take 1 tablet (81 mg) by mouth daily   Quantity:  30 tablet   Refills:  0       ATORVASTATIN CALCIUM PO        Dose:  80 mg   Take 80 mg by mouth daily   Refills:  0       blood glucose monitoring test strip   Commonly known as:  no brand specified   Used for:  Hypoglycemia        Use to test blood sugar three times daily or as directed.   Quantity:  300 each   Refills:  3       DOXYCYCLINE HYCLATE PO        Dose:  100 mg   Take 100 mg by mouth 2 times daily For 10 days   Refills:  0       DULoxetine 30 MG EC capsule   Commonly known as:  CYMBALTA   Used for:  Polyneuropathy associated with underlying disease (H)        Dose:  30 mg   Take 1 capsule (30 mg) by mouth daily   Quantity:  90 capsule   Refills:  3       glipiZIDE 10 MG tablet   Commonly known as:  GLUCOTROL   Used for:  Type 2 diabetes mellitus with diabetic peripheral angiopathy without gangrene, without long-term current use of insulin (H)        Dose:  10 mg   Take 1 tablet (10 mg) by mouth 2 times daily (before meals)   Quantity:  180 tablet   Refills:  3       HYDROcodone-acetaminophen 5-325 MG per tablet   Commonly known as:  NORCO   Used for:  Left foot infection         Dose:  1-2 tablet   Take 1-2 tablets by mouth every 4 hours as needed for moderate to severe pain Reported on 3/7/2017   Refills:  0       ipratropium 0.03 % spray   Commonly known as:  ATROVENT        Dose:  2 spray   Spray 2 sprays into both nostrils every 12 hours   Refills:  0       LISINOPRIL PO        Dose:  2.5 mg   Take 2.5 mg by mouth daily   Refills:  0       METAMUCIL PO        Dose:  1 packet   Take 1 packet by mouth daily   Refills:  0       METFORMIN HCL PO        Dose:  1000 mg   Take 1,000 mg by mouth 2 times daily (with meals)   Refills:  0       metoprolol 50 MG 24 hr tablet   Commonly known as:  TOPROL-XL   Used for:  Cardiomyopathy (H), Coronary artery disease involving native coronary artery of native heart without angina pectoris        Dose:  50 mg   Take 1 tablet (50 mg) by mouth daily   Quantity:  90 tablet   Refills:  3       omeprazole 40 MG capsule   Commonly known as:  priLOSEC   Used for:  Other acute gastritis without hemorrhage        Dose:  40 mg   Take 1 capsule (40 mg) by mouth daily Take 30-60 minutes before a meal.   Quantity:  90 capsule   Refills:  3       order for DME   Used for:  Type 2 diabetes mellitus without complication (H)        Equipment being ordered: [employer] Blood Glucose meter.   Quantity:  1 Device   Refills:  0       PLAVIX PO   Used for:  Cough        Dose:  75 mg   Take 75 mg by mouth daily   Refills:  0       tamsulosin 0.4 MG capsule   Commonly known as:  FLOMAX   Used for:  Benign non-nodular prostatic hyperplasia with lower urinary tract symptoms        Dose:  0.4 mg   Take 1 capsule (0.4 mg) by mouth daily   Quantity:  90 capsule   Refills:  3            Where to get your medicines      Some of these will need a paper prescription and others can be bought over the counter. Ask your nurse if you have questions.     You don't need a prescription for these medications     cephALEXin 500 MG capsule    naproxen 250 MG tablet         Information about where  to get these medications is not yet available     ! Ask your nurse or doctor about these medications     HYDROcodone-acetaminophen 5-325 MG per tablet                Protect others around you: Learn how to safely use, store and throw away your medicines at www.disposemymeds.org.             Medication List: This is a list of all your medications and when to take them. Check marks below indicate your daily home schedule. Keep this list as a reference.      Medications           Morning Afternoon Evening Bedtime As Needed    AMITRIPTYLINE HCL PO   Take 25 mg by mouth nightly as needed for sleep                                aspirin 81 MG EC tablet   Take 1 tablet (81 mg) by mouth daily   Last time this was given:  81 mg on 6/9/2017  9:07 AM                                ATORVASTATIN CALCIUM PO   Take 80 mg by mouth daily   Last time this was given:  80 mg on 6/9/2017  9:07 AM                                blood glucose monitoring test strip   Commonly known as:  no brand specified   Use to test blood sugar three times daily or as directed.                                cephALEXin 500 MG capsule   Commonly known as:  KEFLEX   Take 1 capsule (500 mg) by mouth 4 times daily for 10 days                                DOXYCYCLINE HYCLATE PO   Take 100 mg by mouth 2 times daily For 10 days                                DULoxetine 30 MG EC capsule   Commonly known as:  CYMBALTA   Take 1 capsule (30 mg) by mouth daily   Last time this was given:  30 mg on 6/9/2017  9:07 AM                                glipiZIDE 10 MG tablet   Commonly known as:  GLUCOTROL   Take 1 tablet (10 mg) by mouth 2 times daily (before meals)   Last time this was given:  10 mg on 6/9/2017  9:08 AM                                HYDROcodone-acetaminophen 5-325 MG per tablet   Commonly known as:  NORCO   Take 1-2 tablets by mouth every 4 hours as needed for moderate to severe pain Reported on 3/7/2017   Last time this was given:  2 tablets on  6/3/2017 12:30 PM                                ipratropium 0.03 % spray   Commonly known as:  ATROVENT   Spray 2 sprays into both nostrils every 12 hours   Last time this was given:  2 sprays on 6/9/2017  9:12 AM                                LISINOPRIL PO   Take 2.5 mg by mouth daily   Last time this was given:  2.5 mg on 6/3/2017 11:22 AM                                METAMUCIL PO   Take 1 packet by mouth daily                                METFORMIN HCL PO   Take 1,000 mg by mouth 2 times daily (with meals)   Last time this was given:  1,000 mg on 6/9/2017  9:07 AM                                metoprolol 50 MG 24 hr tablet   Commonly known as:  TOPROL-XL   Take 1 tablet (50 mg) by mouth daily   Last time this was given:  50 mg on 6/9/2017  9:07 AM                                naproxen 250 MG tablet   Commonly known as:  NAPROSYN   Take 1 tablet (250 mg) by mouth 3 times daily (with meals) for 4 days                                omeprazole 40 MG capsule   Commonly known as:  priLOSEC   Take 1 capsule (40 mg) by mouth daily Take 30-60 minutes before a meal.   Last time this was given:  40 mg on 6/9/2017  9:07 AM                                order for DME   Equipment being ordered: [employer] Blood Glucose meter.                                PLAVIX PO   Take 75 mg by mouth daily   Last time this was given:  75 mg on 6/9/2017 10:41 AM                                tamsulosin 0.4 MG capsule   Commonly known as:  FLOMAX   Take 1 capsule (0.4 mg) by mouth daily   Last time this was given:  0.4 mg on 6/9/2017  9:07 AM

## 2017-05-31 NOTE — IP AVS SNAPSHOT
"` `           [place] [address] [facility]: [phone]                                              INTERAGENCY TRANSFER FORM - NURSING   2017                    Hospital Admission Date: 2017  SID AGUIAR   : 1928  Sex: Male        Attending Provider: (none)    Allergies:  Oxycodone, Sulfa Drugs, Tetracycline    Infection:  None   Service:  SURGERY    Ht:  1.651 m (5' 5\")   Wt:  62.3 kg (137 lb 5.6 oz)   Admission Wt:  73 kg (161 lb)    BMI:  22.86 kg/m 2   BSA:  1.69 m 2            Patient PCP Information     Provider PCP Type    Matt Morrissey MD General    Matt Morrissey MD Internal Medicine      Current Code Status     Date Active Code Status Order ID Comments User Context       Prior      Code Status History     Date Active Date Inactive Code Status Order ID Comments User Context    2017 10:07 AM  Full Code [id number]  Faustino Aguilar MD Outpatient    2017  8:56 PM 2017 10:07 AM Full Code [id number]  Blaze Torres MD Inpatient    2017  3:57 PM 2017  8:56 PM Full Code [id number]  Tra Reynoso MD Outpatient    2017  1:55 PM 2017  3:57 PM Full Code [id number]  Blaze Rose MD Inpatient    2016  1:26 PM 2017  1:55 PM Full Code [id number]  Paloma Chen MD Outpatient    2016  3:13 AM 2016  1:26 PM Full Code [id number]  Faustino Aguilar MD Inpatient    3/2/2012 11:45 AM 2016  3:13 AM Full Code [id number]  Wilmar Medrano MD Outpatient      Advance Directives        Does patient have a scanned Advance Directive/ACP document in EPIC?           No        Hospital Problems as of 2017              Priority Class Noted POA    Coronary artery disease involving native coronary artery of native heart without angina pectoris Medium  10/20/2016 Yes    Type 2 diabetes mellitus with diabetic peripheral angiopathy without gangrene, without long-term current use of insulin (H) Medium  10/20/2016 " Yes    Benign non-nodular prostatic hyperplasia with lower urinary tract symptoms Medium  10/20/2016 Yes    Gastroesophageal reflux disease without esophagitis Medium  10/20/2016 Yes    Benign essential hypertension Medium  1/6/2017 Yes    Paroxysmal atrial fibrillation (H) Medium  Unknown Yes    Cardiomyopathy (H) Medium  Unknown Yes    Diabetic foot ulcer (H) Medium  5/31/2017 Yes      Non-Hospital Problems as of 6/9/2017              Priority Class Noted    Mild cognitive impairment Medium  10/20/2016    Hyperlipidemia LDL goal <70 Medium  10/20/2016    Nephrolithiasis Medium  10/20/2016    Cerebrovascular accident (H) Medium  10/20/2016    Carotid artery stenosis Medium  10/20/2016    Abdominal aortic aneurysm (H) Medium  12/25/2016    Acute pancreatitis Medium  12/25/2016    Lumbar back pain Medium  12/30/2016    Acute right-sided low back pain without sciatica Medium  1/5/2017    TIA (transient ischemic attack) Medium  1/20/2017    Aortic root dilatation (H) Medium  Unknown    Peripheral neuropathy (H) Medium  4/21/2017      Immunizations     Name Date      Influenza (High Dose) 3 valent vaccine 10/16/16     Pneumococcal (PCV 13) 12/05/14     Pneumococcal 23 valent 01/01/00     TD (ADULT, 7+) 11/01/10     Zoster vaccine, live 01/01/12          END      ASSESSMENT     Discharge Profile Flowsheet     EXPECTED DISCHARGE     Resources List  Transitional Care 06/08/17 1447    Expected Discharge Date  06/09/17 ([facility] at 1400) 06/08/17 1447   Transitional Care  [facility] [phone] 06/08/17 1447    DISCHARGE NEEDS ASSESSMENT     PAS Number  [id number] 06/08/17 1504    Concerns To Be Addressed  discharge planning concerns 06/06/17 1517   Senior Linkage Line Referral Placed  06/08/17 06/08/17 1504    Patient/family verbalizes understanding of discharge plan recommendations?  Yes 06/06/17 1517   Referrals Placed  Senior Linkage Line 06/08/17 1504    Medical Team notified of  "plan?  yes 06/06/17 1517   SKIN      Anticipated Changes Related to Illness  inability to care for self 06/06/17 1517   Inspection  Full 06/09/17 0946    Equipment Currently Used at Home  cane, straight 06/05/17 1345   Skin areas NOT inspected  Foot, left;Ankle, left 06/08/17 1026    Transportation Available  van, wheelchair accessible ([facility] at 1500) 06/08/17 1448   Skin WDL  ex 06/09/17 0946    # of Referrals Placed by CTS  External Care Coordination;Communication hand-offs to next level of Care Providers 06/09/17 1343   Skin Color/Characteristics  bruised (ecchymotic) 06/09/17 0159    Does Patient Need a Referral for Clinic CC  Yes 12/29/16 1100   Skin Temperature  warm 06/09/17 0159    GASTROINTESTINAL (ADULT,PEDIATRIC,OB)     Skin Moisture  dry 06/09/17 0159    GI WDL  WDL 06/09/17 0946   Skin Integrity  bruise(s);incision(s) 06/09/17 0946    Last Bowel Movement  06/08/17 06/09/17 0159   Procedural focused assessment (identify areas inspected)   Hip, right;Foot, left;Ankle, left;Heel, left 06/06/17 1927    Passing flatus  yes 06/09/17 0159   Skin Elasticity  quick return to original state 06/09/17 0159    COMMUNICATION ASSESSMENT     SAFETY      Patient's communication style  spoken language (English or Bilingual) 05/31/17 1729   Safety WDL  WDL 06/09/17 0946    FINAL RESOURCES                        Assessment WDL (Within Defined Limits) Definitions           Safety WDL     Effective: 09/28/15    Row Information: <b>WDL Definition:</b> Bed in low position, wheels locked; call light in reach; upper side rails up x 2; ID band on<br> <font color=\"gray\"><i>Item=AS safety wdl>>List=AS safety wdl>>Version=F14</i></font>      Skin WDL     Effective: 09/28/15    Row Information: <b>WDL Definition:</b> Warm; dry; intact; elastic; without discoloration; pressure points without redness<br> <font color=\"gray\"><i>Item=AS skin wdl>>List=AS skin wdl>>Version=F14</i></font>      Vitals     Vital Signs Flowsheet     " "VITAL SIGNS     HEIGHT AND WEIGHT      Temp  98.4  F (36.9  C) 06/09/17 0746   Height  1.651 m (5' 5\") 05/31/17 1726    Temp src  Oral 06/09/17 0746   Height Method  Stated 05/31/17 1726    Resp  16 06/09/17 0746   Weight  62.3 kg (137 lb 5.6 oz) (weight without leg wedge and pcd compression machine) 06/06/17 0636    Pulse  72 06/09/17 0746   BSA (Calculated - sq m)  1.83 05/31/17 1726    Heart Rate  75 06/09/17 0544   BMI (Calculated)  26.85 05/31/17 1726    Pulse/Heart Rate Source  Monitor 06/09/17 0746   SOO COMA SCALE      BP  150/65 06/09/17 0746   Best Eye Response  4-->(E4) spontaneous 06/04/17 0200    BP Location  Right arm 06/09/17 0746   Best Motor Response  6-->(M6) obeys commands 06/04/17 0200    OXYGEN THERAPY     Best Verbal Response  5-->(V5) oriented 06/04/17 0200    SpO2  95 % 06/09/17 0746   Soo Coma Scale Score  15 06/04/17 0200    O2 Device  None (Room air) 06/09/17 0746   POSITIONING      Oxygen Delivery  2 LPM 06/07/17 0448   Body Position  supine, head elevated 06/09/17 0209    PAIN/COMFORT     Head of Bed (HOB)  HOB at 20 degrees 06/09/17 0209    Patient Currently in Pain  denies 06/09/17 0136   Chair  Upright in chair 06/08/17 1745    Preferred Pain Scale  number (Numeric Rating Pain Scale) 06/06/17 1953   Positioning/Transfer Devices  pillows;wedge 06/09/17 0209    Patient's Stated Pain Goal  No pain 06/05/17 0016   DAILY CARE      0-10 Pain Scale  1 06/08/17 1448   Activity Type  activity adjusted per tolerance 06/09/17 0209    Pain Location  Generalized 06/06/17 0700   Activity Level of Assistance  assistance, 2 people 06/09/17 0209    Pain Orientation  Left 06/05/17 0016   Additional Documentation  Activity Device Assistance (Row) 06/07/17 1817    Pain Descriptors  Sharp 06/05/17 0016   Activity Assistive Device  gait belt;walker 06/09/17 0209    Pain Intervention(s)  Medication (See eMAR) 06/06/17 0700   ECG      Response to Interventions  Relief 06/06/17 2031   ECG Rhythm  " Normal sinus rhythm 06/06/17 1933    ANALGESIA SIDE EFFECTS MONITORING     Ectopy  None 06/06/17 1933    Side Effects Monitoring: Respiratory Quality  R 06/08/17 1705   POINT OF CARE TESTING      Side Effects Monitoring: Respiratory Depth  N 06/08/17 1705   Puncture Site  fingertip 06/04/17 1200    Side Effects Monitoring: Sedation Level  1 06/08/17 1705   Bedside Glucose (mg/dl )   135 mg/dl 06/04/17 1200            Patient Lines/Drains/Airways Status    Active LINES/DRAINS/AIRWAYS     Name: Placement date: Placement time: Site: Days: Last dressing change:    Incision/Surgical Site 06/04/17 Left Foot 06/04/17       5     Incision/Surgical Site 06/06/17 Left Foot 06/06/17   1922    2             Patient Lines/Drains/Airways Status    Active PICC/CVC     None            Intake/Output Detail Report     Date Intake     Output   Net    Shift P.O. I.V. IV Piggyback Total Urine Blood Total       Noc 06/07/17 2300 - 06/08/17 0659 -- -- -- -- 375 -- 375 -375    Day 06/08/17 0700 - 06/08/17 1459 -- -- -- -- 300 -- 300 -300    Charisse 06/08/17 1500 - 06/08/17 2259 -- -- -- -- 100 -- 100 -100    Noc 06/08/17 2300 - 06/09/17 0659 -- -- -- -- 100 -- 100 -100    Day 06/09/17 0700 - 06/09/17 1459 -- 100 -- 100 450 -- 450 -350      Last Void/BM       Most Recent Value    Urine Occurrence 1 at 06/08/2017 1700    Stool Occurrence 1 at 06/09/2017 0940      Case Management/Discharge Planning     Case Management/Discharge Planning Flowsheet     REFERRAL INFORMATION     Sources Of Support  friend(s) 06/06/17 1517    Admission Type  inpatient 06/09/17 1343   Reaction To Health Status  accepting 06/06/17 1517    Arrived From  home or self-care 06/09/17 1343   Understanding Of Condition And Treatment  adequate understanding of medical condition 06/06/17 1517    Referral Source  interdisciplinary rounds 06/09/17 1343   EXPECTED DISCHARGE      New Steerage to  Clinics?  No 06/09/17 1343   Expected Discharge Date  06/09/17 ([facility] at 1400) 06/08/17 1447    # of Referrals Placed by CTS  External Care Coordination;Communication hand-offs to next level of Care Providers 06/09/17 1343   ASSESSMENT/CONCERNS TO BE ADDRESSED      Reason For Consult  discharge planning 06/09/17 1343   Concerns To Be Addressed  discharge planning concerns 06/06/17 1517    Record Reviewed  plan of care 06/09/17 1343   DISCHARGE PLANNING       Assigned to Case  melo [facility] 06/09/17 1343   Patient/family verbalizes understanding of discharge plan recommendations?  Yes 06/06/17 1517    Primary Care Clinic Name   clinics  06/09/17 1343   Medical Team notified of plan?  yes 06/06/17 1517    Primary Care MD Name  Matt Liveon  06/09/17 1343   Anticipated Changes Related to Illness  inability to care for self 06/06/17 1517    LIVING ENVIRONMENT     Transportation Available  van, wheelchair accessible ([facility] at 1500) 06/08/17 1448    Lives With  friend(s) 06/06/17 1515   Does Patient Need a Referral for Clinic CC  Yes 12/29/16 1100    Living Arrangements  house 06/06/17 1515   FINAL RESOURCES      Provides Primary Care For  no one 06/06/17 1515   Equipment Currently Used at Home  cane, straight 06/05/17 1345    Quality Of Family Relationships  supportive;involved 06/06/17 1515   Resources List  Transitional Care 06/08/17 1447    Able to Return to Prior Living Arrangements  yes 06/06/17 1515   Transitional Care  [email or web address] [phone] 06/08/17 1447    HOME SAFETY     PAS Number  [id number] 06/08/17 1504    Patient Feels Safe Living in Home?  yes 06/06/17 1515   Senior Linkage Line Referral Placed  06/08/17 06/08/17 1504    ASSESSMENT OF FAMILY/SOCIAL SUPPORT     Referrals Placed  Senior Linkage Line 06/08/17 1504    Marital Status   06/06/17 1517   ABUSE RISK SCREEN      Who is your support system?  Other (specify) (friend) 06/06/17 1517   QUESTION TO PATIENT:  Has a member of your family or a partner(now or in  the past) intimidated, hurt, manipulated, or controlled you in any way?  patient declined to answer or is unable to answer 05/31/17 1731    Description of Support System  Supportive;Involved 06/06/17 1517   QUESTION TO PATIENT: Do you feel safe going back to the place where you are living?  patient declined to answer or is unable to answer 05/31/17 1731    Support Assessment  Adequate family and caregiver support 06/06/17 1517   OBSERVATION: Is there reason to believe there has been maltreatment of a vulnerable adult (ie. Physical/Sexual/Emotional abuse, self neglect, lack of adequate food, shelter, medical care, or financial exploitation)?  no 05/31/17 1731    Quality of Family Relationships  supportive;involved 06/06/17 1517   (R) MENTAL HEALTH SUICIDE RISK      Communication preferences  phone 06/06/17 1517   Are you depressed or being treated for depression?  Yes 05/31/17 2123    COPING/STRESS     HOMICIDE RISK      Major Change/Loss/Stressor  hospitalization 06/06/17 1517   Homicidal Ideation  no 05/31/17 1731    Patient Personal Strengths  able to adapt 06/06/17 1517

## 2017-05-31 NOTE — IP AVS SNAPSHOT
"[place] [address] [facility]: [phone]                                              INTERAGENCY TRANSFER FORM - PHYSICIAN ORDERS   2017                    Hospital Admission Date: 2017  SID AGUIAR   : 1928  Sex: Male        Attending Provider: (none)    Allergies:  Oxycodone, Sulfa Drugs, Tetracycline    Infection:  None   Service:  SURGERY    Ht:  1.651 m (5' 5\")   Wt:  62.3 kg (137 lb 5.6 oz)   Admission Wt:  73 kg (161 lb)    BMI:  22.86 kg/m 2   BSA:  1.69 m 2            Patient PCP Information     Provider PCP Type    Matt Morrissey MD General    Matt Morrissey MD Internal Medicine      ED Clinical Impression     Diagnosis Description Comment Added By Time Added    Cellulitis of left lower extremity [L03.116] Cellulitis of left lower extremity [L03.116]  Faustino Aguilar MD 2017 10:09 AM      Hospital Problems as of 2017              Priority Class Noted POA    Coronary artery disease involving native coronary artery of native heart without angina pectoris Medium  10/20/2016 Yes    Type 2 diabetes mellitus with diabetic peripheral angiopathy without gangrene, without long-term current use of insulin (H) Medium  10/20/2016 Yes    Benign non-nodular prostatic hyperplasia with lower urinary tract symptoms Medium  10/20/2016 Yes    Gastroesophageal reflux disease without esophagitis Medium  10/20/2016 Yes    Benign essential hypertension Medium  2017 Yes    Paroxysmal atrial fibrillation (H) Medium  Unknown Yes    Cardiomyopathy (H) Medium  Unknown Yes    Diabetic foot ulcer (H) Medium  2017 Yes      Non-Hospital Problems as of 2017              Priority Class Noted    Mild cognitive impairment Medium  10/20/2016    Hyperlipidemia LDL goal <70 Medium  10/20/2016    Nephrolithiasis Medium  10/20/2016    Cerebrovascular accident (H) Medium  10/20/2016    Carotid artery stenosis Medium  10/20/2016    Abdominal aortic aneurysm (H) Medium  " 12/25/2016    Acute pancreatitis Medium  12/25/2016    Lumbar back pain Medium  12/30/2016    Acute right-sided low back pain without sciatica Medium  1/5/2017    TIA (transient ischemic attack) Medium  1/20/2017    Aortic root dilatation (H) Medium  Unknown    Peripheral neuropathy (H) Medium  4/21/2017      Code Status History     Date Active Date Inactive Code Status Order ID Comments User Context    6/9/2017 10:07 AM  Full Code [id number]  Faustino Aguilar MD Outpatient    5/31/2017  8:56 PM 6/9/2017 10:07 AM Full Code [id number]  Blaze Torres MD Inpatient    1/21/2017  3:57 PM 5/31/2017  8:56 PM Full Code [id number]  Tra Reynoso MD Outpatient    1/20/2017  1:55 PM 1/21/2017  3:57 PM Full Code [id number]  Blaze Rose MD Inpatient    12/28/2016  1:26 PM 1/20/2017  1:55 PM Full Code [id number]  Paloma Chen MD Outpatient    12/25/2016  3:13 AM 12/28/2016  1:26 PM Full Code [id number]  Faustino Aguilar MD Inpatient    3/2/2012 11:45 AM 12/25/2016  3:13 AM Full Code [id number]  Wilmar Medrano MD Outpatient         Medication Review      START taking        Dose / Directions Comments    cephALEXin 500 MG capsule   Commonly known as:  KEFLEX   Used for:  Left foot infection, Cellulitis of left lower extremity        Dose:  500 mg   Take 1 capsule (500 mg) by mouth 4 times daily for 10 days   Quantity:  28 capsule   Refills:  0        naproxen 250 MG tablet   Commonly known as:  NAPROSYN   Used for:  Swelling of limb        Dose:  250 mg   Take 1 tablet (250 mg) by mouth 3 times daily (with meals) for 4 days   Quantity:  60 tablet   Refills:  0          CONTINUE these medications which have NOT CHANGED        Dose / Directions Comments    AMITRIPTYLINE HCL PO        Dose:  25 mg   Take 25 mg by mouth nightly as needed for sleep   Refills:  0        aspirin 81 MG EC tablet   Used for:  Transient cerebral ischemia, unspecified type        Dose:  81 mg   Take 1 tablet (81 mg) by mouth  daily   Quantity:  30 tablet   Refills:  0        ATORVASTATIN CALCIUM PO        Dose:  80 mg   Take 80 mg by mouth daily   Refills:  0        blood glucose monitoring test strip   Commonly known as:  no brand specified   Used for:  Hypoglycemia        Use to test blood sugar three times daily or as directed.   Quantity:  300 each   Refills:  3    True Metrix strips or brand desired by insurance       DOXYCYCLINE HYCLATE PO        Dose:  100 mg   Take 100 mg by mouth 2 times daily For 10 days   Refills:  0        DULoxetine 30 MG EC capsule   Commonly known as:  CYMBALTA   Used for:  Polyneuropathy associated with underlying disease (H)        Dose:  30 mg   Take 1 capsule (30 mg) by mouth daily   Quantity:  90 capsule   Refills:  3        glipiZIDE 10 MG tablet   Commonly known as:  GLUCOTROL   Used for:  Type 2 diabetes mellitus with diabetic peripheral angiopathy without gangrene, without long-term current use of insulin (H)        Dose:  10 mg   Take 1 tablet (10 mg) by mouth 2 times daily (before meals)   Quantity:  180 tablet   Refills:  3        HYDROcodone-acetaminophen 5-325 MG per tablet   Commonly known as:  NORCO   Used for:  Left foot infection        Dose:  1-2 tablet   Take 1-2 tablets by mouth every 4 hours as needed for moderate to severe pain Reported on 3/7/2017   Refills:  0        ipratropium 0.03 % spray   Commonly known as:  ATROVENT        Dose:  2 spray   Spray 2 sprays into both nostrils every 12 hours   Refills:  0        LISINOPRIL PO        Dose:  2.5 mg   Take 2.5 mg by mouth daily   Refills:  0        METAMUCIL PO        Dose:  1 packet   Take 1 packet by mouth daily   Refills:  0        METFORMIN HCL PO        Dose:  1000 mg   Take 1,000 mg by mouth 2 times daily (with meals)   Refills:  0        metoprolol 50 MG 24 hr tablet   Commonly known as:  TOPROL-XL   Used for:  Cardiomyopathy (H), Coronary artery disease involving native coronary artery of native heart without angina pectoris         Dose:  50 mg   Take 1 tablet (50 mg) by mouth daily   Quantity:  90 tablet   Refills:  3        omeprazole 40 MG capsule   Commonly known as:  priLOSEC   Used for:  Other acute gastritis without hemorrhage        Dose:  40 mg   Take 1 capsule (40 mg) by mouth daily Take 30-60 minutes before a meal.   Quantity:  90 capsule   Refills:  3        order for DME   Used for:  Type 2 diabetes mellitus without complication (H)        Equipment being ordered: True Matrix Blood Glucose meter.   Quantity:  1 Device   Refills:  0        PLAVIX PO   Used for:  Cough        Dose:  75 mg   Take 75 mg by mouth daily   Refills:  0        tamsulosin 0.4 MG capsule   Commonly known as:  FLOMAX   Used for:  Benign non-nodular prostatic hyperplasia with lower urinary tract symptoms        Dose:  0.4 mg   Take 1 capsule (0.4 mg) by mouth daily   Quantity:  90 capsule   Refills:  3                Summary of Visit     Reason for your hospital stay       You were hospitalized for a left foot infection. You were started on antibiotics. You had evaluation for insufficient circulation in the affected extremity and this was treated with a procedure/stent. Subsequently had surgery to treat the infection with improvement.             After Care     Activity       Your activity upon discharge: NWB LLE.       Activity       Your activity upon discharge:  Non weight bearing.       Diet       Follow this diet upon discharge: Orders Placed This Encounter      Snacks/Supplements Adult: Other - Please comment; yogurt; With Meals      Room Service      Combination Diet [id number] Calories: Moderate Consistent CHO (4-6 CHO units/meal)         Dressing       Change dressing every other day with dry gauze, kerlix roll, and ace bandage. Keep foot and dressing dry.       General info for SNF       Length of Stay Estimate: Short Term Care: Estimated # of Days <30  Condition at Discharge: Improving  Level of care:skilled   Rehabilitation Potential:  Good  Admission H&P remains valid and up-to-date: Yes  Recent Chemotherapy: N/A  Use Nursing Home Standing Orders: Yes       Mantoux instructions       Give two-step Mantoux (PPD) Per Facility Policy Yes             Referrals     Occupational Therapy Adult Consult       Evaluate and treat as clinically indicated.    Reason:  Weakness/deconditioning       Physical Therapy Adult Consult       Evaluate and treat as clinically indicated.    Reason:  weakness/deconditioning             Your next 10 appointments already scheduled     Jul 24, 2017  3:00 PM CDT   [facility]sey MD   [facility]    [address] [facility] [id number] [phone]           Bring a current list of meds and any records pertaining to this visit.  For Physicals, please bring immunization records and any forms needing to be filled out.  Please arrive 10 minutes early to complete paperwork.              Follow-Up Appointment Instructions     Future Labs/Procedures    Follow Up and recommended labs and tests     Comments:    Follow up with primary care provider, Matt Morrissey, within 7 days for hospital follow- up.  The following labs/tests are recommended: BMP. Surgical follow up per Podiatry.  Dr. Rodriguez's office will call for vascular f/up to be arranged.    Follow-up and recommended labs and tests      Comments:    Follow up with Dr. Ann in 1 week from discharge from the hospital.      Follow-Up Appointment Instructions     Follow Up and recommended labs and tests       Follow up with primary care provider, Matt Morrissey, within 7 days for hospital follow- up.  The following labs/tests are recommended: BMP. Surgical follow up per Podiatry.  Dr. Rodriguez's office will call for vascular f/up to be arranged.       Follow-up and recommended labs and tests        Follow up with Dr. Ann in 1 week from discharge from the hospital.             Statement of Approval      Ordered          06/09/17 1222  I have reviewed and agree with all the recommendations and orders detailed in this document.  EFFECTIVE NOW     Approved and electronically signed by:  Faustino Aguilar MD

## 2017-05-31 NOTE — IP AVS SNAPSHOT
` ` Patient Information     Patient Name Sex     Dustin Pearce ([id number]) Male 1928       Room Bed    [facility] 5502-02      Patient Demographics     Address Phone E-mail Address    [address]  [facility] [address] [phone] (Home) [email or web address]      Patient Ethnicity & Race     Ethnic Group Patient Race    American White      Emergency Contact(s)     Name Relation Home Work Mobile    Halina Dhillon Friend [phone]     [place]sc Other [phone]        Documents on File        Status Date Received Description       Documents for the Patient    Privacy Notice - [place] Received 12     Insurance Card  03     Face Sheet  () 03     External Medication Information Consent Accepted () 10/24/10     Patient ID Received 12     Consent for Services - Hospital/Clinic Received () 10/24/10     Insurance Card       Insurance Card Received 12     Consent for Services - Hospital/Clinic Received () 12     Consent for Services - Hospital/Clinic Received () 12     Consent for EHR Access  13 Copied from existing Consent for services - C/HOD collected on 2012    [facility] Specified Other       Consent for Services - Hospital/Clinic Received () 13     Insurance Card Received 13     Patient ID Received 13     Consent to Communicate Received 14 phi    Consent for Services - Hospital/Clinic Received () 14     Consent for Services/Privacy Notice - Hospital/Clinic Received 07/10/16     Insurance Card Received 16 Medicare    Insurance Card Received 16 bcbs    Patient ID Received 16     External Medication Information Consent Accepted 10/20/16     Speech Therapy Certification Received 12/15/16 Video swallow study    Insurance Card Received 17        Documents for the Encounter    CMS IM for Patient Signature Received [facility]      Admission  Information     Attending Provider Admitting Provider Admission Type Admission Date/Time     Moe Kirk DPM Emergency 05/31/17  1731    Discharge Date Hospital Service Auth/Cert Status Service Area     Surgery Incomplete [facility]    Unit Room/Bed Admission Status        55 ORTHO SPEC UNIT 5502/5502-02 Admission (Confirmed)       Admission     Complaint    Diabetic foot ulcer (H), unknown, INFECTED FOOT      Hospital Account     Name Acct ID Class Status Primary Coverage    Dustin Pearce [id number] Inpatient Open MEDICARE - MEDICARE            Guarantor Account (for Hospital Account #[id number])     Name Relation to Pt Service Area Active? Acct Type    Dustin Pearce  FCS Yes Personal/Family    Address Phone          [address] [address] [phone](H)  none(O)              Coverage Information (for Hospital Account #[id number])     1. MEDICARE/MEDICARE     F/O Payor/Plan Precert #    MEDICARE/MEDICARE     Subscriber Subscriber #    Dustin Pearce [id number]    Address Phone    [employer]  [address]  [place] [address] [phone]          2. [place]     F/O Payor/Plan Precert #    [place]     Subscriber Subscriber #    Dustin Pearce [id number]    Address Phone    [address] [address]  [place], [place] [id number] [phone]

## 2017-05-31 NOTE — ED PROVIDER NOTES
History     Chief Complaint:  Wound Check    HPI   Dustin Pearce is a 89 year old male who presents with his friend to the emergency department for evaluation of an ulcer on his left foot.  The patient states he was at the [facility] earlier today where Dr. Arrieta was concerned about his ulcer and told him to present to the ED.  The patient states that he has had this ulcer since December 2016. Since that time, the wound has had poor healing and worsened over the weekend with increased redness, swelling and pain.  His friend states that while changing his bandage last night she noticed discharge from the ulcer.  He has been off and on antibiotics since December, but was started on doxycyline yesterday for worsening redness.  The patient denies any fevers, nausea or general sickness.    MRI 4/27/2017  Marrow edema, suspicious for underlying osteomyelitis.    Allergies:  Oxycodone  Sulfa drugs  Tetracycline    Medications:    Prinivil/Zestril  Cymbalta  Glucotrol  Keflex  Toprol  Prilosec  Flomax  Norco  Metformin  Metamucil  Plavix    Past Medical History:    Hypertension  CAD  Cardiomyopathy  Diabetes mellitus  DJD  Gastro-oesophageal reflux disease  Hyperlipidemia  Nephrolithiasis  Osteopenia  Paroxysmal atrial fibrillation    Past Surgical History:    Cholecystectomy  Genitourinary surgery: Kidney stone removal x4  Hernia repair  Rotator cuff repair    Family History:    Mother: Breast Cancer  Father: Heart Failure    Social History:  The patient is a former smoker and states that he drinks one drink per night.  Marital Status:   [4]    Review of Systems   Constitutional: Negative for fever.   Gastrointestinal: Negative for nausea and vomiting.   Skin: Positive for color change and wound.        Positive for foot pain.    All other systems reviewed and are negative.    Physical Exam     Patient Vitals for the past 24 hrs:   BP Temp Temp src Pulse Heart Rate Resp SpO2 Height Weight  "  05/31/17 2036 156/62 - - - 65 - 97 % - -   05/31/17 1724 159/51 98.9  F (37.2  C) Temporal 59 - 16 98 % 1.651 m (5' 5\") 73 kg (161 lb)     Physical Exam  General: Resting comfortably on the gurney  Eyes:  The pupils are equal and round  ENT:    Moist mucous membranes  Neck:  Normal range of motion  CV:  Regular rate and rhythm    Skin warm and well perfused     DP/PT pulses 2+ bilaterally  Resp:  Lungs are clear    Non-labored    No rales    No wheezing   GI:  Abdomen is soft, there is no rigidity    No distension    No rebound tenderness     No abdominal tenderness  MS:  Normal muscular tone    Mild bilateral lower extremity edema  Skin:  Erythema of the dorsum and plantar aspect of the left foot, small ulcer on the plantar aspect of the left foot near 5th metatarsal with minimal drainage. Tender in this area  Neuro:   Awake, alert.      Speech is normal and fluent.    Face is symmetric.     Moves all extremities equally    SILT on bilateral LE  Psych:  Normal affect.  Appropriate interactions.    Emergency Department Course   Imaging:  Foot x-ray, left, 3 views:   Degenerative changes at the left third proximal  interphalangeal joint. Osteopenia is noted in the left foot, but no  displaced fractures are seen.  Report per radiology.   Radiographic findings were communicated with the patient who voiced understanding of the findings.    Laboratory:  CRP; 21.2 (H)   SED rate: 43 (H)   CBC:  WBC 9.5, HGB 12.4 (L),   BMP: Glucose 249 (H), Creatinine 0.62 (L), otherwise WNL   Blood Culture: pending x 2     Interventions:  (1946) Zosyn, 3.375 g, IV   (2029) Vancomycin, 1250 mg, IV    Emergency Department Course:  Nursing notes and vitals reviewed.  I performed an exam of the patient as documented above.     A peripheral IV was established. Blood was drawn from the patient. This was sent for laboratory testing, findings above.      The patient was sent for a Foot x-ray while in the emergency department, findings " above.      1853 I consulted with Dr. Arrieta from podiatry for history of patient.    Findings and plan explained to the patient who consents to admission.   (1922) I discussed the patient with Dr. Torres of the hospitalist service, who will admit the patient to a medical bed for further monitoring, evaluation, and treatment.     Impression & Plan      Medical Decision Making:  Dustin Pearce is a 89 year old male who presented to the emergency department with a wound check. Vital signs are normal. He was sent from clinic for concern of osteomyelitis and cellulitis of the foot. There is an ulcer on his foot with erythema. I talked to his podiatrist who reports that he is concerned that he still has osteomyelitis of the left fifth metatarsal as the MRI at end of April showed this. Pt has been on and off antibiotics since then. Podiatry was hoping to perform outpatient surgery but patient had declined earlier. I was able to obtain the MRI results from the end of April. Dr. Arrieta ecommended admission, antibiotics, likely infectious disease consult and will need a podiatry consult in the hospital. Unfortunately Dr. Arrieta is unable to perform the surgery at this time so patient will need a new podiatrist consult while in hospital. Discussed the patient with the hospitalist who agreed to accept the patient. Overall the patient is well appearing and does not appear septic with normal vital signs.       Diagnosis:    ICD-10-CM    1. Cellulitis L03.90 Blood culture       Disposition:  Admitted to the hospital    Khoa BOYD, am serving as a scribe on 5/31/2017 at 6:16 PM to personally document services performed by Nadia Miller,  based on my observations and the provider's statements to me.     5/31/2017    EMERGENCY DEPARTMENT       Nadia Miller MD  05/31/17 1485

## 2017-05-31 NOTE — IP AVS SNAPSHOT
` [place] [address] [facility]: [phone]                 INTERAGENCY TRANSFER FORM - NOTES (H&P, Discharge Summary, Consults, Procedures, Therapies)   2017                    Hospital Admission Date: 2017  SID PEARCE   : 1928  Sex: Male        Patient PCP Information     Provider PCP Type    Matt Morrissey MD General    Matt Morrissey MD Internal Medicine         History & Physicals      H&P signed by Blaze Torres MD at 2017 12:00 PM      Author:  Blaze Torres MD Service:  Hospitalist Author Type:  Physician    Filed:  2017 12:00 PM Date of Service:  2017  8:45 PM Creation Time:  2017  9:16 PM    Status:  Signed :  Blaze Torres MD (Physician)         DATE OF ADMISSION:  2017.       CHIEF COMPLAINT:  Sid Pearce is an 89-year-old diabetic man who has a 6-month history of left foot ulcer, who now comes in with increasing redness, pain and warmth and drainage from the left foot wound.      HISTORY OF PRESENT ILLNESS:  The patient is an 89-year-old type 2 diabetic man who has been battling a left foot ulcer for the past 6 months under the care of Dr. Arrieta, podiatrist.  Since 3-4 days ago, the wife noticed some increased purulent drainage from the foot and then yesterday noted increased redness, swelling and warmth.  The patient also had a significant increase in pain in his foot.  Of note, he does have diabetic peripheral neuropathy.  He was seen by Dr. Arrieta in the [facility] earlier today and was referred to the ED for ongoing evaluation and treatment.  On review, he has no fevers, sweats, chills, nausea, vomiting, shortness of breath, chest pain, abdominal pain, diarrhea.      REVIEW OF SYSTEMS:  Comprehensive review is negative or otherwise listed in the HPI above.      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      SURGICAL HISTORY:  Includes rotator cuff repair, left and right, laser lithotripsy and ureteral stent, hernia repair, cholecystectomy.      FAMILY HISTORY:  Mother with breast cancer, father with heart failure.      SOCIAL HISTORY:  Former smoker, quit in 1999, approximately 30-pack-year history.  Drinks on average 1 alcoholic beverage per week.  No other drug use.  The patient is .      ALLERGIES AND REACTIONS:  Oxycodone causes sweating.  Allergies to sulfa, tetracycline, exact reaction unknown.        MEDICATIONS (YET TO BE CLARIFIED):     1.  Lisinopril 2.5 mg daily.   2.  Amitriptyline 25 mg each day at bedtime p.r.n.   3.  Atrovent nasal spray 0.3% 2 sprays both nostrils b.i.d.   4.  Doxycycline 100 mg b.i.d.     5.  Unclear if still taking Cymbalta 30 mg daily.   6.  Glucotrol 10 mg b.i.d.   7.  Toprol-XL 50 mg daily.   8.  Prilosec 40 mg daily.   9.  Flomax 0.4 mg daily.   10.  Aspirin 81 mg daily.   11.  Norco 1-2 tabs every 4 hours as needed for pain.   12.  Metformin 1000 mg b.i.d.   13.  Atorvastatin 80 mg daily.   14.  Clopidogrel 75 mg daily.      PHYSICAL EXAMINATION:   GENERAL:  The patient is a pleasant man in no acute apparent distress.   VITAL SIGNS:  Blood pressure is 156/62, pulse is 60, respirations 16, temperature is 98.9 degrees Fahrenheit, O2 sats 97% on room air, weight 73 kg.   SKIN:  Warm, dry, no jaundice.   HEENT:  No icterus.  Mucous membranes moist.  Oropharynx clear.   NECK:  Supple, no adenopathy or thyromegaly.   LUNGS:  Clear bilaterally, no wheezes, rhonchi or rales.   CARDIOVASCULAR:  Regular rate and rhythm.  I did not detect gallop or rub.  Chest wall is nontender.   ABDOMEN:  Soft,  benign, no hepatosplenomegaly.  Bowel sounds are present.   GENITOURINARY:  Rectal was deferred.   EXTREMITIES:  With diminished peripheral pulses at the feet and ankles.  On the left foot, there is redness and warmth on the dorsum of the foot wrapping long term around under the plantar arch at the bottom of the foot.  There are small more punctate ulcerations over the MCP and in the plantar arch area.  Did not express any purulence from these areas.  This area is very tender to the touch.     NEUROLOGIC:  Gross motor exam intact in upper and lower extremities. Decreased sensation in the feet.  Did not test gait or Romberg.     LABORATORY DATA:  Reviewed.  Basic metabolic panel is normal.  Calcium 8.8, recent hemoglobin A1c in 02/21/2017 is 8.3.  CRP is elevated at 21.2, glucose is 249.  White count is 9.5, hemoglobin is 12.4, platelets of 192,000.  Sed rate is 43.      IMAGING:  X-ray of the left foot was reviewed.  There are degenerative changes noted and some osteopenia, no obvious fracture.  Blood cultures drawn.      ASSESSMENT:  The patient is an 89-year-old man with multiple medical problems including coronary vascular disease, carotid vascular disease, history of atrial fibrillation (not on anticoagulation), hypertension, hyperlipidemia, gastroesophageal reflux disease, cardiomyopathy, diabetes mellitus type 2, now presenting with a diabetic foot ulcer and acute cellulitis.  The patient has increased pain and purulence noted per significant other.      PLAN:  We will admit for IV antibiotics, vancomycin and Zosyn.  We will have ID see in consultation as well as Podiatry.  It is my understanding Dr. Arrieta does not have clinical privileges here, so will consult another podiatry group.  We will proceed with MRI of the left foot to look for either abscess and/or osteomyelitis which may impact the plan of care.  Will continue blood pressure and cardiac meds above as well as oral agents for his diabetes.  If  glucoses are consistently greater than 180, may need to initiate sliding scale coverage.  Will give Tylenol and Norco for pain.  If debridement or other surgical intervention is needed, would likely need to stop aspirin and Plavix.         BLAZE FIGUEREDO MD             D: 2017 20:45   T: 2017 21:16   MT: DA      Name:     SID PEARCE   MRN:      [id number]        Account:      [id number]   :      1928           Admitted:     [id number]      Document: [id number]       cc: Matt Morrissey MD[PK1.1]        Revision History        User Key Date/Time User Provider Type Action    > PK1.1 2017 12:00 PM Blaze Figueredo MD Physician Sign     [N/A] 2017  9:16 PM Blaze Figueredo MD Physician Edit                  Discharge Summaries     No notes of this type exist for this encounter.         Consult Notes      Consults signed by Roland Rodriguez MD at 2017  1:33 PM      Author:  Roland Rodriguez MD Service:  Surgery Author Type:  Physician    Filed:  2017  1:33 PM Date of Service:  2017 11:21 AM Creation Time:  2017  1:23 PM    Status:  Signed :  Roland Rodriguez MD (Physician)         VASCULAR SURGERY CONSULTATION      CHIEF COMPLAINT:  Gangrenous left fifth toe.      HISTORY OF PRESENT ILLNESS:  Sid Pearce is an 89-year-old patient with diabetes, coronary artery disease, cerebral vascular disease and hypertension who developed a sore over his left fifth toe approximately 6 months ago.  He had been followed by Dr. Arrieta of Podiatry.  The patient has been offloading and limiting his ambulation on the toe.  He has noticed over the last 3-4 days increasing pain and erythema of the site with some purulent drainage.  The patient was admitted to [facility] on 2017.  He has been seen by Dr. Moe Kirk of Podiatric Surgery.  The patient obviously will require toe amputation, but is very concerned about his distal circulation.  There  are no palpable distal pulses, and KRYSTINA was diminished.  We were therefore asked to see the patient in vascular surgical consultation.      The patient reports that the ulcer developed spontaneously with no trauma.  He does have some mild diabetic peripheral neuropathy involving both of his feet, but still has gross sensation.  He has never had any problems in his right leg.  He denies any classic claudication type symptoms.      ALLERGIES:  Oxycodone, sulfa and tetracycline.      MEDICATIONS AT ADMISSION:   1.  Lisinopril 2.5 mg daily.   2.  Toprol-XL 50 mg daily.   3.  Metformin 1000 mg b.i.d.   4.  Glucotrol 10 mg b.i.d.   5.  Lipitor 80 mg daily.   6.  Plavix 75 mg daily.   7.  Prilosec 40 mg daily.   8.  Flomax 0.4 mg daily.   9.  Aspirin 81 mg daily.   10.  The patient had been on doxycycline 100 mg p.o. b.i.d. for the foot infection at the time of admission.     11.  Amitriptyline 25 mg each day at bedtime p.r.n.   12.  possibly on Cymbalta 30 mg daily.      PAST MEDICAL HISTORY:   1.  History of coronary disease.  He has had a distant myocardial infarction.  He apparently underwent angiography, but no intervention.  Most recent ejection fraction was 35-40%.   2.  Essential hypertension.   3.  History of cerebrovascular accident.  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  Apparently not related to his carotid arteries.  He had a good recovery eventually.   4.  Paroxysmal atrial fibrillation.   5.  Nephrolithiasis.   6.  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      PAST SURGICAL HISTORY:   1.  Cholecystectomy.   2.  Herniorrhaphy.     3.  Rotator cuff repair bilaterally.      SOCIAL HISTORY:  The patient quit smoking in 1999 after a 30-pack-year history.  He has 1 alcoholic beverage weekly.  The patient is  and has been living independently.      PHYSICAL EXAMINATION:   GENERAL:  The patient is alert and appropriate.   VITAL  SIGNS:  He is afebrile.  Vital signs are stable.  Height is 5 feet 5 inches.  Weight is 75.8 kilograms.  BMI is 27.8 kg/m2.   HEENT:  Remarkable for bilateral loud carotid bruits.   CHEST:  Clear to auscultation.   CARDIOVASCULAR:  Regular without murmur.   ABDOMEN:  Mildly obese, soft and nontender.   EXTREMITIES:  There are gangrenous changes with erythema and marked tenderness to the left fifth toe.  This does not extend significantly onto the forefoot.  This area is markedly tender to touch.  He does have light touch sensation to both of his feet.  There is no edema in either calf.  There are no ulcerations of the right foot, though he does have some calluses on the plantar aspect.  +3 radial pulses bilaterally with a +3 right femoral pulse.  I cannot palpate the left femoral pulse.  I cannot palpate either popliteal or pedal pulses.      DATA:  On 06/02/2017, serum creatinine was 0.66 with a GFR calculated at greater than 90.        On 06/01/2017, sodium was 141, potassium 4.6 and calcium 8.7.  White count was 8,000 and hemoglobin 11.5.      On 06/02/2017, KRYSTINA revealed calcified noncompressible vessels on the right with dampened wave forms.  KRYSTINA on the left was 0.65 with a dampened monophasic waveform indicative of severe peripheral artery disease.      MRI of his left foot from 06/01/2017 revealed a likely osteomyelitis of the fifth metatarsal head.      IMPRESSION:     1.  Gangrenous- infected- nonhealing left fifth toe ulcer. He has significant peripheral artery disease, particularly involving the left leg.  He may have iliac disease also with the decreased left femoral pulse.  The patient does need to have improved blood supply for any chance of healing.  We therefore will perform an aortogram with left leg runoff by Dr. Lindquist of Interventional Radiology today.  If a lesion is amenable to angioplasty, this will be performed.  This has been discussed with the patient along with his significant other,  "Halina (on the phone).  We will map his greater saphenous veins in case a bypass would be necessary, and this will help us determine how aggressive we should be with Interventional Radiology to improve blood supply to his foot.   2.  Significant bilateral carotid bruits.  The patient is at obvious risk for carotid bifurcation disease.  A screening carotid duplex ultrasound will also be performed today in case surgery is necessary for the other problems.      I spent over 40 minutes with the patient today with over 50% in consultation.         ROLAND CUNNINGHAM MD             D: 2017 11:21   T: 2017 13:23   MT: [id number]      Name:     SID AGUIAR   MRN:      [id number]        Account:       [id number]   :      1928           Consult Date:  2017      Document: [id number]       cc: Roland Cunningham MD[WO1.1]        Revision History        User Key Date/Time User Provider Type Action    > WO1.1 2017  1:33 PM Roland Cunningham MD Physician Sign     [N/A] 2017  1:23 PM Roland Cunningham MD Physician Edit            Consults by Sena Serrano RD, LD at 2017 12:00 PM     Author:  Sena Serrano RD, LD Service:  Nutrition Author Type:  Registered Dietitian    Filed:  2017 12:02 PM Date of Service:  2017 12:00 PM Creation Time:  2017 11:53 AM    Status:  Addendum :  Sena Serrano RD, LD (Registered Dietitian)           MD RECOMMENDATIONS:  1) daily multivitamin  2) change diet from \"regular\" to \"Moderate CHO\"[SJ1.1]      BRIEF NUTRITION ASSESSMENT      REASON FOR ASSESSMENT:  Nutrition Admission Screen - \"Stageable pressure injuries or large/non-healing wound or burn\"    NUTRITION HISTORY:  Visited with pt this morning  He tells me that he has seen dietitians in the past  His MD has told him that blood sugars need to be good to help with wound healing  Pt chooses not to follows a diet at home  States he has ~2 regular COKE and ~2 Arnold " "Rubio drinks per week  Does not really eat sweets  Dislikes rice and doesn't eat much bread  Loves pot roast    CURRENT DIET AND INTAKE:  Diet:  Regular              Pt with good appetite  Ate 100% breakfast today - omelet, sausage, fruit, blueberry muffin, OJ    ANTHROPOMETRICS:  Height: 5'5\"  Weight: (6/1) 75.6 kg  BMI: 27.7 kg/m2  IBW:  61.8 kg  Weight Status: Overweight BMI 25-29.9  %IBW: 122%  Weight History:[SJ1.2]   Wt Readings from Last 10 Encounters:   06/01/17 75.6 kg (166 lb 11.2 oz)   04/21/17 73 kg (161 lb)   03/07/17 72 kg (158 lb 12.8 oz)   02/24/17 71.2 kg (157 lb)   01/27/17 72.4 kg (159 lb 11.2 oz)   01/20/17 69 kg (152 lb 1.9 oz)   01/16/17 70.3 kg (155 lb)   01/06/17 73.4 kg (161 lb 14.4 oz)   12/27/16 73.8 kg (162 lb 11.2 oz)   11/25/16 74.8 kg (165 lb)[SJ1.3]       6/1: Podiatry consult --->  \"Approx 4mm x 2mm ulceration sub left 5th MPJ area with exposed fat layer.  Does not probe to bone.  Local erythema and edema extending to dorsal midfoot area.  4 small dry scabs to plantar left foot likely from prior bandage.  Anticipate pt will need at least partial left 5th ray amputation due to MRI indicated osteomyelitis, possible early abscess.\"      LABS:  4/21/17: HgbA1C 8.3    BGM: 116, 164, 154    MALNUTRITION:  Patient does not meet two of the following criteria necessary for diagnosing malnutrition: significant weight loss, reduced intake, subcutaneous fat loss, muscle loss or fluid retention    NUTRITION INTERVENTION:  Nutrition Diagnosis:  No nutrition diagnosis at this time.    Implementation:  Nutrition Education ---> Verbally reviewed importance of good blood sugar control for wound healing.  Pt declined further review/handouts.    FOLLOW UP/MONITORING:   Will re-evaluate in 7 - 10 days, or sooner, if re-consulted.[SJ1.2]             Revision History        User Key Date/Time User Provider Type Action    > SJ1.1 6/1/2017 12:02 PM Sena Serrano, EDDIE, LD Registered Dietitian Addend     " SJ1.3 6/1/2017 12:00 PM Sena Serrano RD, MINNIE Registered Dietitian Sign     SJ1.2 6/1/2017 11:53 AM Sena Serrano RD, MINNIE Registered Dietitian             Consults by Anastasia Cee MD at 6/1/2017 10:40 AM     Author:  Anastasia Cee MD Service:  Infectious Disease Author Type:  Physician    Filed:  6/1/2017 11:48 AM Date of Service:  6/1/2017 10:40 AM Creation Time:  6/1/2017 10:35 AM    Status:  Signed :  Anastasia Cee MD (Physician)     Consult Orders:    1. Infectious Diseases IP Consult: Patient to be seen: Routine - within 24 hours; L foot osteomyelitis; Consultant may enter orders: Yes [[id number]] ordered by Moe Kirk DPM at 06/01/17 1019     2. Infectious Diseases IP Consult: Patient to be seen: Routine - within 24 hours; diabetic foot ulcer and cellulitis, ongoing chronic left ulceration; Consultant may enter orders: Yes [[id number]] ordered by Blaze Torres MD at 05/31/17 2033                [facility]    Infectious Disease Consultation     Date of Admission:  5/31/2017  Date of Consult (When I saw the patient): 06/01/17[DS1.1]    Assessment & Plan[DS1.2]   Dustin Pearce is a 89 year old male who was admitted on 5/31/2017. I was asked to see the patient for Left foot osteomyelitis.     Impression:   89 y.o male with DM, peripheral neuropathy.   Admitted with worsening in the non healing left foot ulcer.   MRI concerning for osteo.   Podiatry evaluation underway.   Started on antibiotics.     Recommendations:   Continue on Zosyn.   Will follow up on the interventions planned by podiatry.[DS1.1]         Anastasia Cee[DS1.2], MD[DS1.1]    Reason for Consult[DS1.2]   Reason for consult: I was asked by Dr. Kirk to evaluate this patient for above mentioned.[DS1.1]     Primary Care Physician   Matt Morrissey    Chief Complaint[DS1.2]   Left foot non healing wound     History is obtained from the patient and medical records[DS1.1]    History of Present  Illness[DS1.2]   Dustin Pearce is a 89 year old male type 2 diabetic who has had a left foot ulcer for the past 6 months.  Since 3-4 days ago, the wife noticed some increased purulent drainage from the foot and then yesterday noted increased redness, swelling and warmth.  The patient also had a significant increase in pain in his foot as well.  Of note, he does have diabetic peripheral neuropathy.  He was seen by Dr. Arrieta in the [facility] earlier today and was referred to the ED for ongoing evaluation and treatment.  On review, he has no fevers, sweats, chills, nausea, vomiting, shortness of breath, chest pain, abdominal pain, diarrhea. Imaging concerning for osteo.[DS1.1]     Past Medical History[DS1.2]   I have reviewed this patient's medical history and updated it with pertinent information if needed.[DS1.1]   Past Medical History:   Diagnosis Date     Aortic root dilatation (H)      Benign essential hypertension 1/6/2017     Benign non-nodular prostatic hyperplasia with lower urinary tract symptoms 10/20/2016     CAD (coronary artery disease)      Cardiomyopathy (H)      Carotid artery stenosis 10/20/2016     Carotid occlusion, left      Coronary artery disease involving native coronary artery of native heart without angina pectoris 10/20/2016     Diabetes mellitus (H)      DJD (degenerative joint disease)      Gastro-oesophageal reflux disease      Gastroesophageal reflux disease without esophagitis 10/20/2016     Heart attack (H)      Hyperlipidemia      Hypertension      Mild cognitive impairment 10/20/2016     Nephrolithiasis     RECURRENT     Osteopenia      Paroxysmal atrial fibrillation (H)      PONV (postoperative nausea and vomiting)      Unspecified cerebral artery occlusion with cerebral infarction        Past Surgical History[DS1.2]   I have reviewed this patient's surgical history and updated it with pertinent information if needed.[DS1.1]  Past Surgical History:   Procedure  Laterality Date     CHOLECYSTECTOMY       ESOPHAGOSCOPY, GASTROSCOPY, DUODENOSCOPY (EGD), COMBINED N/A 12/26/2016    Procedure: COMBINED ESOPHAGOSCOPY, GASTROSCOPY, DUODENOSCOPY (EGD);  Surgeon: Nasim Louis MD;  Location:  GI     GENITOURINARY SURGERY      KIDNEY STONE REMOVAL X 4     HC UGI ENDOSCOPY W EUS N/A 12/29/2016    Procedure: COMBINED ENDOSCOPIC ULTRASOUND, ESOPHAGOSCOPY, GASTROSCOPY, DUODENOSCOPY (EGD);  Surgeon: Nasim Louis MD;  Location:  GI     HERNIA REPAIR       LASER HOLMIUM LITHOTRIPSY URETER(S), INSERT STENT, COMBINED  3/2/2012    Procedure:COMBINED CYSTOSCOPY, URETEROSCOPY, LASER HOLMIUM LITHOTRIPSY URETER(S), INSERT STENT; CYSTOSCOPY, RIGHT RETROGRADES, RIGHT URETEROSCOPY, HOLMIUM LASER, RIGHT STENT PLACEMENT; Surgeon:ANJELICA LEÓN; Location: OR     ROTATOR CUFF REPAIR RT/LT         Prior to Admission Medications   Prior to Admission Medications   Prescriptions Last Dose Informant Patient Reported? Taking?   AMITRIPTYLINE HCL PO Past Month at prn Spouse/Significant Other Yes Yes   Sig: Take 25 mg by mouth nightly as needed for sleep   ATORVASTATIN CALCIUM PO 5/31/2017 at Unknown time Spouse/Significant Other Yes Yes   Sig: Take 80 mg by mouth daily   Clopidogrel Bisulfate, [id number], (PLAVIX PO) 5/31/2017 at Unknown time Spouse/Significant Other Yes Yes   Sig: Take 75 mg by mouth daily    DOXYCYCLINE HYCLATE PO 5/31/2017 at Unknown time Spouse/Significant Other Yes Yes   Sig: Take 100 mg by mouth 2 times daily For 10 days   DULoxetine (CYMBALTA) 30 MG EC capsule 5/31/2017 at Unknown time Spouse/Significant Other No Yes   Sig: Take 1 capsule (30 mg) by mouth daily   HYDROcodone-acetaminophen (NORCO) 5-325 MG per tablet Past Month at prn Spouse/Significant Other Yes Yes   Sig: Take 1-2 tablets by mouth every 4 hours as needed for moderate to severe pain Reported on 3/7/2017   LISINOPRIL PO 5/31/2017 at Unknown time Spouse/Significant Other Yes Yes   Sig: Take  "2.5 mg by mouth daily   METFORMIN HCL PO 5/31/2017 at x1 Spouse/Significant Other Yes Yes   Sig: Take 1,000 mg by mouth 2 times daily (with meals)   Psyllium (METAMUCIL PO) Past Month at prn Spouse/Significant Other Yes Yes   Sig: Take 1 packet by mouth daily    aspirin EC 81 MG EC tablet 5/31/2017 at Unknown time Spouse/Significant Other No Yes   Sig: Take 1 tablet (81 mg) by mouth daily   blood glucose monitoring (NO BRAND SPECIFIED) test strip  Spouse/Significant Other No No   Sig: Use to test blood sugar three times daily or as directed.   glipiZIDE (GLUCOTROL) 10 MG tablet 5/31/2017 at x1 Spouse/Significant Other No Yes   Sig: Take 1 tablet (10 mg) by mouth 2 times daily (before meals)   ipratropium (ATROVENT) 0.03 % spray Past Week at Unknown time Spouse/Significant Other Yes Yes   Sig: Spray 2 sprays into both nostrils every 12 hours   metoprolol (TOPROL-XL) 50 MG 24 hr tablet 5/31/2017 at Unknown time Spouse/Significant Other No Yes   Sig: Take 1 tablet (50 mg) by mouth daily   omeprazole (PRILOSEC) 40 MG capsule 5/31/2017 at Unknown time Spouse/Significant Other No Yes   Sig: Take 1 capsule (40 mg) by mouth daily Take 30-60 minutes before a meal.   order for DME  Spouse/Significant Other No No   Sig: Equipment being ordered: [employer] Blood Glucose meter.   tamsulosin (FLOMAX) 0.4 MG capsule 5/30/2017 at Unknown time Spouse/Significant Other No Yes   Sig: Take 1 capsule (0.4 mg) by mouth daily      Facility-Administered Medications: None     Allergies   Allergies   Allergen Reactions     Oxycodone Other (See Comments)     \"TERRIBLE SWEATING\"     Sulfa Drugs      Tetracycline        Immunization History   Immunization History   Administered Date(s) Administered     Influenza (High Dose) 3 valent vaccine 10/16/2016     Pneumococcal (PCV 13) 12/05/2014     Pneumococcal 23 valent 01/01/2000     TD (ADULT, 7+) 11/01/2010     Zoster vaccine, live 01/01/2012       Social History[DS1.2]   I have reviewed this " patient's social history and updated it with pertinent information if needed. Dustin Pearce[DS1.1]  reports that he quit smoking about 18 years ago. His smoking use included Cigarettes. He has a 30.00 pack-year smoking history. He has never used smokeless tobacco. He reports that he drinks about 4.2 oz of alcohol per week  He reports that he does not use illicit drugs.    Family History[DS1.2]   I have reviewed this patient's family history and updated it with pertinent information if needed.[DS1.1]   Family History   Problem Relation Age of Onset     Breast Cancer Mother      Heart Failure Father 81       Review of Systems[DS1.2]   The 10 point Review of Systems is negative other than noted in the HPI or here.[DS1.1]     Physical Exam   Temp: 97.2  F (36.2  C) Temp src: Oral BP: 148/46 Pulse: 59 Heart Rate: 58 Resp: 16 SpO2: 98 % O2 Device: None (Room air)[DS1.2]    Vital Signs with Ranges[DS1.1]  Temp:  [97.2  F (36.2  C)-98.9  F (37.2  C)] 97.2  F (36.2  C)  Pulse:  [59] 59  Heart Rate:  [58-69] 58  Resp:  [16-20] 16  BP: (137-159)/(46-62) 148/46  SpO2:  [92 %-98 %] 98 %  166 lbs 11.2 oz[DS1.2]    GENERAL APPEARANCE: alert and no distress  EYES: Eyes grossly normal to inspection, PERRL and conjunctivae and sclerae normal  HENT: ear canals and TM's normal and nose and mouth without ulcers or lesions  NECK: no adenopathy, no asymmetry, masses, or scars and thyroid normal to palpation  RESP: lungs clear to auscultation - no rales, rhonchi or wheezes  CV: regular rates and rhythm, normal S1 S2, no S3 or S4 and no murmur, click or rub  LYMPHATICS: normal ant/post cervical and supraclavicular nodes  ABDOMEN: soft, nontender, without hepatosplenomegaly or masses and bowel sounds normal  MS: extremities normal-[DS1.1] a small ulcer in the foot with surrounding redness[DS1.3]   SKIN: no suspicious lesions or rashes  NEURO: Normal strength and tone, mentation intact and speech normal  PSYCH: mentation appears normal and  affect normal/bright[DS1.1]    Data   Lab Results   Component Value Date    WBC 8.0 06/01/2017    HGB 11.5 (L) 06/01/2017    HCT 35.9 (L) 06/01/2017     06/01/2017     06/01/2017    POTASSIUM 4.6 06/01/2017    CHLORIDE 105 06/01/2017    CO2 33 (H) 06/01/2017    BUN 12 06/01/2017    CR 0.64 (L) 06/01/2017     (H) 06/01/2017    SED 43 (H) 05/31/2017    TROPI 0.030 01/20/2017    AST 43 12/27/2016    ALT 50 12/27/2016    ALKPHOS 90 12/27/2016    BILITOTAL 0.7 12/27/2016    INR 1.03 01/20/2017       Recent Labs  Lab 05/31/17 1930 05/31/17  1905   CULT No growth after 8 hours No growth after 10 hours     Recent Labs   Lab Test  05/31/17 1930 05/31/17   1905   CULT  No growth after 8 hours  No growth after 10 hours[DS1.2]                Revision History        User Key Date/Time User Provider Type Action    > DS1.3 6/1/2017 11:48 AM Anastasia Cee MD Physician Sign     DS1.2 6/1/2017 10:36 AM Anastasia Cee MD Physician      DS1.1 6/1/2017 10:35 AM Anastasia Cee MD Physician             Consults by Moe Kirk DPM at 6/1/2017 10:49 AM     Author:  Moe Kirk DPM Service:  Podiatry Author Type:  Podiatrist    Filed:  6/1/2017 10:49 AM Date of Service:  6/1/2017 10:49 AM Creation Time:  6/1/2017 10:39 AM    Status:  Signed :  Moe Kirk DPM (Podiatrist)     Consult Orders:    1. Podiatry IP Consult: Patient to be seen: Routine - within 24 hours; diabetic foot ulcer and cellulitis; Consultant may enter orders: Yes [[id number]] ordered by Blaze Torres MD at 05/31/17 2033                [facility]  June 1, 2017      ASSESSMENT: L chronic foot ulceration, PAD, MRI indicated osteomyelitis and possible early abscess     PLAN:  Reviewed patient's chart in epic.  Discussed condition and treatment options including pros and cons.    -Anticipate pt will need at least partial left 5th ray amputation due to MRI indicated osteomyelitis,  possible early abscess.  -Vascular status in question; ABIs ordered; anticipate Vascular Surgery consult pending results.  -Foot surgery to be delayed pending Vascular workup, but this may change if condition deteriorates; pt stable at this time.  -IV abx to continue; ID consulted for management.  -Dressing applied to foot.  -Offload heels at all times while in bed; discussed plan with RN.  -Will follow; thank you for the consult.     Moe Kirk DPM, FACFAS  Pager: [phone]      PATIENT HISTORY:  Dustin Pearce is a 89 year old male who was admitted for a worsening left foot infection.  Pt has had a wound on this foot for about 5 months, sub 5th MPJ area.  Pt relates he has been under the care of Dr. Arrieta at [facility].  He was admitted through the ED.  Pt reports pain in the foot.  Denies f/c/n/v.  PMH complex including DM, CAD, afib on anticoagulation.    Review of Systems:  Rest of complete 10 pt ROS neg except for HPI.     PAST MEDICAL HISTORY:   Past Medical History:   Diagnosis Date     Aortic root dilatation (H)      Benign essential hypertension 1/6/2017     Benign non-nodular prostatic hyperplasia with lower urinary tract symptoms 10/20/2016     CAD (coronary artery disease)      Cardiomyopathy (H)      Carotid artery stenosis 10/20/2016     Carotid occlusion, left      Coronary artery disease involving native coronary artery of native heart without angina pectoris 10/20/2016     Diabetes mellitus (H)      DJD (degenerative joint disease)      Gastro-oesophageal reflux disease      Gastroesophageal reflux disease without esophagitis 10/20/2016     Heart attack (H)      Hyperlipidemia      Hypertension      Mild cognitive impairment 10/20/2016     Nephrolithiasis     RECURRENT     Osteopenia      Paroxysmal atrial fibrillation (H)      PONV (postoperative nausea and vomiting)      Unspecified cerebral artery occlusion with cerebral infarction         PAST SURGICAL HISTORY:    Past Surgical History:   Procedure Laterality Date     CHOLECYSTECTOMY       ESOPHAGOSCOPY, GASTROSCOPY, DUODENOSCOPY (EGD), COMBINED N/A 12/26/2016    Procedure: COMBINED ESOPHAGOSCOPY, GASTROSCOPY, DUODENOSCOPY (EGD);  Surgeon: Nasim Louis MD;  Location:  GI     GENITOURINARY SURGERY      KIDNEY STONE REMOVAL X 4     HC UGI ENDOSCOPY W EUS N/A 12/29/2016    Procedure: COMBINED ENDOSCOPIC ULTRASOUND, ESOPHAGOSCOPY, GASTROSCOPY, DUODENOSCOPY (EGD);  Surgeon: Nasim Louis MD;  Location:  GI     HERNIA REPAIR       LASER HOLMIUM LITHOTRIPSY URETER(S), INSERT STENT, COMBINED  3/2/2012    Procedure:COMBINED CYSTOSCOPY, URETEROSCOPY, LASER HOLMIUM LITHOTRIPSY URETER(S), INSERT STENT; CYSTOSCOPY, RIGHT RETROGRADES, RIGHT URETEROSCOPY, HOLMIUM LASER, RIGHT STENT PLACEMENT; Surgeon:ANJELICA LEÓN; Location: OR     ROTATOR CUFF REPAIR RT/LT          MEDICATIONS:   Current Facility-Administered Medications:      vancomycin 1250 mg in 0.9% NaCl 250 mL PREMIX, 1,250 mg, Intravenous, Once, Nadia Miller MD, Stopped at 05/31/17 2029     amitriptyline (ELAVIL) tablet 25 mg, 25 mg, Oral, At Bedtime PRN, Blaze Torres MD     aspirin EC EC tablet 81 mg, 81 mg, Oral, Daily, Blaze Torres MD, 81 mg at 06/01/17 0951     atorvastatin (LIPITOR) tablet 80 mg, 80 mg, Oral, Daily, Blaze Torres MD, 80 mg at 06/01/17 0952     clopidogrel (PLAVIX) tablet 75 mg, 75 mg, Oral, Daily, Blaze Torres MD, 75 mg at 06/01/17 0951     DULoxetine (CYMBALTA) EC capsule 30 mg, 30 mg, Oral, Daily, Blaze Torres MD, 30 mg at 06/01/17 0952     glipiZIDE (GLUCOTROL) tablet 10 mg, 10 mg, Oral, BID AC, Blaze Torres MD, 10 mg at 06/01/17 0951     HYDROcodone-acetaminophen (NORCO) 5-325 MG per tablet 1-2 tablet, 1-2 tablet, Oral, Q4H PRN, Blaze Torres MD, 2 tablet at 05/31/17 5275     metFORMIN (GLUCOPHAGE) tablet 1,000 mg, 1,000 mg, Oral, BID w/meals, Blaze Torres MD, 1,000 mg at 06/01/17  0951     lisinopril (PRINIVIL/Zestril) tablet 2.5 mg, 2.5 mg, Oral, Daily, Blaze Torres MD, 2.5 mg at 06/01/17 0952     ipratropium (ATROVENT) 0.03 % spray 2 spray, 2 spray, Both Nostrils, Q12H, Blaze Torres MD, 2 spray at 06/01/17 0952     metoprolol (TOPROL-XL) 24 hr tablet 50 mg, 50 mg, Oral, Daily, Blaze Torres MD, 50 mg at 06/01/17 0952     omeprazole (priLOSEC) CR capsule 40 mg, 40 mg, Oral, Daily, Blaze Torres MD, 40 mg at 06/01/17 0951     psyllium (METAMUCIL/KONSYL) Packet 1 packet, 1 packet, Oral, Daily, Blaze Torres MD, 1 packet at 06/01/17 0951     tamsulosin (FLOMAX) capsule 0.4 mg, 0.4 mg, Oral, Daily, Blaze Torres MD, 0.4 mg at 06/01/17 0951     naloxone (NARCAN) injection 0.1-0.4 mg, 0.1-0.4 mg, Intravenous, Q2 Min PRN, Blaze Torres MD     lidocaine 1 % 1 mL, 1 mL, Other, Q1H PRN, Blaze Torres MD     lidocaine (LMX4) cream, , Topical, Q1H PRN, Blaze Torres MD     sodium chloride (PF) 0.9% PF flush 3 mL, 3 mL, Intracatheter, Q1H PRN, Blaze Torres MD     sodium chloride (PF) 0.9% PF flush 3 mL, 3 mL, Intracatheter, Q8H, Blaze Torres MD     acetaminophen (TYLENOL) tablet 650 mg, 650 mg, Oral, Q4H PRN, Blaze Torres MD, 650 mg at 06/01/17 0530     ondansetron (ZOFRAN-ODT) ODT tab 4 mg, 4 mg, Oral, Q6H PRN **OR** ondansetron (ZOFRAN) injection 4 mg, 4 mg, Intravenous, Q6H PRN, Blaze Torres MD     piperacillin-tazobactam (ZOSYN) 3.375 g vial to attach to  mL bag, 3.375 g, Intravenous, Q6H, Blaze Torres MD, 3.375 g at 06/01/17 0946     glucose 40 % gel 15-30 g, 15-30 g, Oral, Q15 Min PRN **OR** dextrose 50 % injection 25-50 mL, 25-50 mL, Intravenous, Q15 Min PRN **OR** glucagon injection 1 mg, 1 mg, Subcutaneous, Q15 Min PRN, Blaze Torres MD     insulin aspart (NovoLOG) inj (RAPID ACTING), 1-7 Units, Subcutaneous, TID AC, Blaze Torres MD, 1 Units at 06/01/17 0952     insulin aspart (NovoLOG) inj (RAPID ACTING), 1-5 Units, Subcutaneous, At  "Bedtime, Blaze Torres MD     ALLERGIES:    Allergies   Allergen Reactions     Oxycodone Other (See Comments)     \"TERRIBLE SWEATING\"     Sulfa Drugs      Tetracycline         SOCIAL HISTORY:   Social History     Social History     Marital status:      Spouse name: N/A     Number of children: N/A     Years of education: N/A     Occupational History     Not on file.     Social History Main Topics     Smoking status: Former Smoker     Packs/day: 1.00     Years: 30.00     Types: Cigarettes     Quit date: 1/1/1999     Smokeless tobacco: Never Used     Alcohol use 4.2 oz/week     7 Standard drinks or equivalent per week      Comment: 1 drink per week     Drug use: No     Sexual activity: No     Other Topics Concern     Not on file     Social History Narrative        FAMILY HISTORY:   Family History   Problem Relation Age of Onset     Breast Cancer Mother      Heart Failure Father 81        EXAM:Vitals: /46 (BP Location: Right arm)  Pulse 59  Temp 97.2  F (36.2  C) (Oral)  Resp 16  Ht 1.651 m (5' 5\")  Wt 75.6 kg (166 lb 11.2 oz)  SpO2 98%  BMI 27.74 kg/m2  BMI= Body mass index is 27.74 kg/(m^2).    General appearance: Patient is alert and fully cooperative with history & exam.  No sign of distress is noted during the visit.     Psychiatric: Affect is pleasant & appropriate.  Patient appears motivated to improve health.     Respiratory: Breathing is regular & unlabored while sitting.     HEENT: Hearing is intact to spoken word.  Speech is clear.  No gross evidence of visual impairment that would impact ambulation.     Dermatologic: Approx 4mm x 2mm ulceration sub left 5th MPJ area with exposed fat layer.  Does not probe to bone.  Local erythema and edema extending to dorsal midfoot area.  4 small dry scabs to plantar left foot likely from prior bandage.  No R foot wounds.     Vascular: DP & PT pulses are absent b/l.  Skin to feet cool.  CRT delayed.  Mild L foot pitting edema.     Neurologic: Lower " extremity sensation is diminished to light touch b/l.       Musculoskeletal: Patient is ambulatory without assistive device or brace.  No gross ankle deformity noted.  No foot or ankle joint effusion is noted.    Lab Results   Component Value Date    WBC 8.0 06/01/2017     Lab Results   Component Value Date    RBC 3.78 06/01/2017     Lab Results   Component Value Date    HGB 11.5 06/01/2017     Lab Results   Component Value Date    HCT 35.9 06/01/2017     No components found for: MCT  Lab Results   Component Value Date    MCV 95 06/01/2017     Lab Results   Component Value Date    MCH 30.4 06/01/2017     Lab Results   Component Value Date    MCHC 32.0 06/01/2017     Lab Results   Component Value Date    RDW 13.8 06/01/2017     Lab Results   Component Value Date     06/01/2017       ESR 43  CRP 21.2      Imaging reviewed with pt:    XR FOOT LT G/E 3 VW 5/31/2017 6:40 PM     COMPARISON: None.     HISTORY: Left foot pain.         IMPRESSION: Degenerative changes at the left third proximal  interphalangeal joint. Osteopenia is noted in the left foot, but no  displaced fractures are seen.     CONNOR BURNS        MR FOOT LEFT WITHOUT AND WITH CONTRAST   6/1/2017 8:19 AM      HISTORY: Diabetic foot ulcer and cellulitis, concern for  osteomyelitis.     DOSE: 7 mL Gadavist.     COMPARISON: 5/31/2017 radiographs.     FINDINGS: There is a suspected soft tissue wound along the plantar  aspect of the fifth metatarsophalangeal joint. Fluid/edema is noted  about the fifth metatarsophalangeal joint capsule. Ill-defined marrow  edema is also noted within the fifth metatarsal head and fifth toe  proximal phalangeal base without apparent osseous destruction.  Ill-defined subcutaneous edema is noted along the dorsum of the foot  as well as within the deeper soft tissues of the forefoot. No  rim-enhancing soft tissue fluid collection or abscess is visible  within the forefoot. Marrow signal within the forefoot is  otherwise  within normal limits.         IMPRESSION:  1. Suspected soft tissue wound along the plantar aspect of the fifth  metatarsophalangeal joint.  2. Marrow edema within the fifth metatarsal head and fifth toe  proximal phalangeal base. Given proximity to soft tissue wound, this  is highly suspicious for osteomyelitis.  3. Fifth metatarsophalangeal joint periarticular fluid signal  intensity. While there is no rim enhancement, this could represent  developing soft tissue abscess adjacent to the joint capsule.  4. Dorsal and deeper soft tissue edema within the forefoot. It should  be noted that MR cannot distinguish reactive edema from cellulitis.     VERO DOYLE MD[MP1.1]              Revision History        User Key Date/Time User Provider Type Action    > MP1.1 6/1/2017 10:49 AM Moe Kirk DPM Podiatrist Sign                     Progress Notes - Physician (Notes from 06/05/17 through 06/08/17)      Progress Notes by Faustino Aguilar MD at 6/8/2017  8:10 AM     Author:  Faustino Aguilar MD Service:  Hospitalist Author Type:  Physician    Filed:  6/8/2017  3:06 PM Date of Service:  6/8/2017  8:10 AM Creation Time:  6/8/2017  8:10 AM    Status:  Addendum :  Faustino Aguilar MD (Physician)         [facility]    Internal Medicine Hospitalist Progress Note  06/08/2017  I evaluated patient on the above date.    Faustino Aguilar Jr., MD  [phone] (p)  Text Page (7 am to 6 pm)      Assessment & Plan Dustin Pearce is a 89 year old male with history including DM2, HTN, CAD, CHF, PAF, CVA, PAD, DLD, GERD and BPH, who presented 5/31 with worsening L toe/foot ulcer/wound.     1.  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  * L foot x-ray 5/31 no acute findings. Started on Zosyn 5/31.  * MRI L foot 6/1 showed  signs suspicious for osteomyelitis 5th MT head and 5th toe prox phalangeal base; 5th MT joint periarticular fluid signal intensity, possibly representing developing soft tissue abscess; dorsal and deeper soft tissue edema within the forefoot.  * Seen by Podiatry and Vascular.   * Underwent vascular w/u as below and underwent L MEG stenting and L SFA angioplasty on 6/2 and subseuqently felt that there was adequate blood supply for healing.  * S/p L partial 5th ray amputation 6/4/2017.[GC1.1] Path negative for osteomyelitis.[GC1.2]  * S/p revision I&D with resection of necrotic tissue down to and including deep fascia 6/6.   * Zosyn changed to Ancef 6/7.  * Micro: BC's 5/31 NGTD. Tissue cultures L foot 6/4 negative. Abcess cultures L foot 6/4 growing mod growth MSSA. L foot cultures 6/6 pending.  - Continue IV cefazolin (started 6/7)[GC1.1]; plan Keflex on d/c for 10d per ID rec's.[GC1.2]  - Monitor cultures.  - Post-op mgmt and further surgery per Podiatry.  - ID following, apprec help[GC1.1] from Dr. Cee.[GC1.2]     2. Peripheral arterial disease with nonhealing, infected wound - s/p L MEG stent and L SFA angioplasty on 6/2.  * H/o carotid disease.  * Seen by Vascular this stay.  * KRYSTINA 6/1 suggested significant arterial insufficiency.   * Carotid US 6/2 showed 50-79& in R and occlusion of the L. LE angiogram 6/2 showed findings including significant stenosis found in left common iliac artery which was stented with a 10mm stent; L SFA stenosis noted and was angioplastied w 4 mm balloon.  - Continue ASA.  - F/u with Vascular 3 months.    3. Acute R[GC1.1] 5th PIP joint swelling, ?gout or fracture (but no trauma).[GC1.3]  * Noted evening 6/7[GC1.1] with painful, swollen and warm R 5th PIP joint; no apparent trauma; gout suspected[GC1.3] and started on colchicine.[GC1.1]  * Not much improved yet 6/8.[GC1.3]  -[GC1.1] Check x-ray.[GC1.3]  - Ortho eval.[GC1.4]  - Continue colchicine.[GC1.3]     4. DM II with peripheral  neuropathy - uncontrolled.  [PTA: glipizide 10 mg BID, metformin 1000 mg BID.]  * Hgb A1C 8.3 4/2017.  * Lantus started 6/3.   Recent Labs   Lab Test  06/08/17   0613  06/08/17   0234  06/07/17   2100  06/07/17   1702  06/07/17   1215  06/07/17   0631   06/06/17   1355   06/01/17   0656   05/31/17   1800   01/20/17   1030   GLC  141*   --    --    --    --    --    --   140*   --   164*   --   249*   --   193*   BGM   --   173*  169*  145*  143*  107*   < >   --    < >   --    < >   --    < >   --     < > = values in this interval not displayed.   - Continue glipizide 10 mg BID.  - Continue Lantus 4U qHS.  - Continue metformin 1000 mg BID.  - Continue ISS.  - After discharge, f/u with PCP regarding adjusting oral regimen; alternatively, consider d/c home with Lantus.    5. CAD, CHF.  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 Echo 1/2017 showed LVEF 35-40%. Nuc stress 3/2017 showed findings consistent with prior MI in the RCA/cirumflex distribution with mild ezekiel-infarct ischemia; EF 37% at rest and 28% post stress.  - Hold Plavix until surgeries done.  - Continue ASA, atorvastatin, metoprolol.  - Hold lisinopril for now.  - Monitor i/o's, daily wts.    6. Hypertension (benign essential).  [PTA: lisinopril 2.5 mg daily, metoprolol XL 50 mg daily.]  - Continue metoprolol XL 50 mg daily.  - Hold lisinopril for now.    7. Cerebrovascular disease.  * H/o CVI.  - Continue ASA.  - Should be on AC, but defer further discussions outpt.     8. Afib  - Continue ASA, metoprolol.  - Should be on AC, but defer further discussions outpt.     9. Depression.  - Continue amitryptiline and duloxetine.      10. GERD.  - Continue omeprazole.     11. BPH.  - Continue Flomax.      Prophylaxis.  - PCD's.    CODE STATUS: FULL    Dispo.  - Pending above.  - Possible d/c to TCU 1-2 days if wound stable.    Interval History[GC1.1]   Acute pain right 5th  "finger PIP joint last night. Started on colchicine.  Still quite painful this am.   Otherwise doing OK. Tolerating diet, no CP or SOB.[GC1.3]    -Data reviewed today: I reviewed all new labs and imaging over the last 24 hours. I personally reviewed no images or EKG's today.    Physical Exam   Heart Rate: 72, Blood pressure 166/72, pulse 79, temperature 98  F (36.7  C), temperature source Oral, resp. rate 16, height 1.651 m (5' 5\"), weight 62.3 kg (137 lb 5.6 oz), SpO2 96 %.  Vitals:    06/04/17 0500 06/05/17 0713 06/06/17 0628   Weight: 75.1 kg (165 lb 9.1 oz) 77.1 kg (169 lb 15.6 oz) 62.3 kg (137 lb 5.6 oz)     Vital Signs with Ranges  Temp:  [97.9  F (36.6  C)-98.6  F (37  C)] 98  F (36.7  C)  Pulse:  [73-92] 79  Heart Rate:  [72-92] 72  Resp:  [16] 16  BP: (143-166)/(61-80) 166/72  SpO2:  [94 %-96 %] 96 %  Patient Vitals for the past 24 hrs:   BP Temp Temp src Pulse Heart Rate Resp SpO2   06/08/17 0700 166/72 98  F (36.7  C) Oral - 72 16 96 %   06/08/17 0335 149/70 98.1  F (36.7  C) Oral - 72 16 96 %   06/08/17 0030 - - - - - 16 -   06/07/17 2337 144/66 98.4  F (36.9  C) Oral - 76 16 94 %   06/07/17 2000 - - - - - 16 -   06/07/17 1917 155/74 98.6  F (37  C) Oral 79 83 16 94 %   06/07/17 1633 166/80 98.6  F (37  C) Oral 92 92 16 95 %   06/07/17 1600 - - - - - 16 -   06/07/17 1115 143/61 97.9  F (36.6  C) Oral 73 - 16 95 %     I/O's Last 24 hours  I/O last 3 completed shifts:  In: 600 [P.O.:500; I.V.:100]  Out: 375 [Urine:375]    Constitutional:[GC1.1] Alert, oriented, pleasant.[GC1.3]  Respiratory: Clear, no crackles/wheezing.  Cardiovascular: Fairly regular, no m/r/g.  GI: Soft, nt, +BS.  Skin/Integumen:[GC1.1] R 5th finger with swelling and mild erythema around 5th PIP joint, limited and painful with movement, slight warmth.  Other:[GC1.3]        Data     Recent Labs  Lab 06/08/17  0613 06/07/17  0640 06/06/17  1355 06/04/17  0830 06/03/17  0655 06/02/17  0707   WBC 8.7 7.0  --   --   --   --    HGB 10.9* 10.3*  " --   --   --   --    MCV 93 93  --   --   --   --     178  --   --   --   --      --   --   --   --   --    POTASSIUM 3.8  --  3.6 4.1  --   --    CHLORIDE 104  --   --   --   --   --    CO2 28  --   --   --   --   --    BUN 12  --   --   --   --   --    CR 0.62*  --   --   --  0.68 0.66   ANIONGAP 6  --   --   --   --   --    ALLISON 8.8  --   --   --   --   --    *  --  140*  --   --   --      Recent Labs   Lab Test  06/08/17   0613  06/08/17   0234  06/07/17   2100  06/07/17   1702  06/07/17   1215  06/07/17   0631   06/06/17   1355   06/01/17   0656   05/31/17   1800   01/20/17   1030   GLC  141*   --    --    --    --    --    --   140*   --   164*   --   249*   --   193*   BGM   --   173*  169*  145*  143*  107*   < >   --    < >   --    < >   --    < >   --     < > = values in this interval not displayed.         No results found for this or any previous visit (from the past 24 hour(s)).    Medications   All medications were reviewed.       ceFAZolin  2 g Intravenous Q8H     colchicine  0.6 mg Oral Daily     sodium chloride (PF)  10 mL Intracatheter Q8H     metFORMIN  1,000 mg Oral BID w/meals     insulin glargine  4 Units Subcutaneous At Bedtime     insulin aspart  1-10 Units Subcutaneous TID AC     insulin aspart  1-7 Units Subcutaneous At Bedtime     aspirin  81 mg Oral Daily     atorvastatin (LIPITOR) tablet 80 mg  80 mg Oral Daily     DULoxetine  30 mg Oral Daily     glipiZIDE  10 mg Oral BID AC     ipratropium  2 spray Both Nostrils Q12H     metoprolol  50 mg Oral Daily     omeprazole  40 mg Oral Daily     psyllium  1 packet Oral Daily     tamsulosin  0.4 mg Oral Daily[GC1.1]          Revision History        User Key Date/Time User Provider Type Action    > GC1.2 6/8/2017  3:06 PM Faustino Aguilar MD Physician Addend     GC1.4 6/8/2017  8:23 AM Faustino Aguilar MD Physician Addend     GC1.3 6/8/2017  8:22 AM Faustino Aguilar MD Physician Sign     GC1.1 6/8/2017  8:10 AM  Faustino Aguilar MD Physician             Progress Notes by Chloe Freeman LSW at 6/8/2017 10:26 AM     Author:  Chloe Freeman LSW Service:  Social Work Author Type:      Filed:  6/8/2017  3:05 PM Date of Service:  6/8/2017 10:26 AM Creation Time:  6/8/2017 10:26 AM    Status:  Addendum :  Chloe Freeman LSW ()         HUBER  D: HUBER following for discharge planning needs.  HUBER noted that per the MD note today, patient may be ready for discharge in 1-2 days.  I: HUBER left a message for Gillian in Admissions at [facility] and for Teresa in Admissions at [facility] to update them on the discharge plan for Friday or Saturday and to follow up regarding bed availability.  HUBER will follow up regarding transportation once the discharge plan has been finalized.  HUBER updated patients friend, Halina, on the discharge plan for Friday or Saturday.  Halina reports that [facility] would be the first choice for TCU due to it being closer to where they live.  A: Patient is alert and oriented.  P: SW will continue to follow and assist with finalizing the discharge plan as appropriate.    ROYA Lehman  [SB1.1]    HUBER  D: HUBER following for discharge planning needs.  HUBER received a message from Teresa in Admissions at [facility] stating that they will have a bed available for patient tomorrow.  I: HUBER updated patient and his friend, Halina, on the above and discussed transportation to get patient to Rehab tomorrow.  Halina requested that HUBER arrange a w/c ride for discharge and is aware that the cost of transportation is not covered under the patients insurance.  HUBER discussed the cost of transportation being a $65 base fee and $4.42 per mile.  HUBER spoke to Willa at [facility] and scheduled transportation for tomorrow at 1500.  HUBER faxed the facesheet to [facility].  HUBER spoke to Gillian in Admissions at [facility] to update  her that patient will no longer need a TCU bed there.  SW spoke to Teresa in Admissions at [facility] to confirm the bed and to update her on the transport time for tomorrow.  SW confirmed that Halina is aware of the $36 per day fee for the private room that is not covered under the patients insurance.  A: Patient is alert and oriented.  P: SW will continue to follow and assist with finalizing the discharge plan as appropriate.    ROYA Lehman  [SB1.2]    PAS-RR    D: Per DHS regulation, SW completed and submitted PAS-RR to [place] [employer] on Aging Direct Connect via the Senior LinkAge Line.  PAS-RR confirmation # is : [id number].    I: SW spoke with patient and his friend and they are aware a PAS-RR has been submitted.  SW reviewed with patient and his friend that they may be contacted for a follow up appointment within 10 days of hospital discharge if their SNF stay is < 30 days.  Contact information for [facility] LinkAge Line was also provided.    A: Patient and his friend verbalized understanding.    P: Further questions may be directed to [facility] LinkAge Line at #[phone], option #4 for PAS-RR staff.    ROYA Lehman  [SB1.3]         Revision History        User Key Date/Time User Provider Type Action    > SB1.3 6/8/2017  3:05 PM Chloe Freeman LSW  Addend     SB1.2 6/8/2017  2:53 PM Chloe Freeman LSW  Addend     SB1.1 6/8/2017 10:29 AM Chloe Freeman LSW  Sign            Progress Notes by Anastasia Cee MD at 6/8/2017  8:18 AM     Author:  Anastasia Cee MD Service:  Infectious Disease Author Type:  Physician    Filed:  6/8/2017  2:23 PM Date of Service:  6/8/2017  8:18 AM Creation Time:  6/8/2017  8:18 AM    Status:  Signed :  Anastasia Cee MD (Physician)         [facility]    Infectious Disease Progress Note    Date of Service (when I saw the patient): 6/5/17     Assessment & Plan   Dustin ROBERTS  Ramses is a 89 year old male who was admitted on 5/31/2017.     Impression:   89 y.o male with DM, peripheral neuropathy.   Admitted with worsening in the non healing left foot ulcer.   MRI concerning for osteo.   S/P partial amputation.   OR cultures from tissue positive for MSSA, pathology no osteo in the proximal bone     Recommendations:   Continue on Ancef.   Since proximal bone negative to osteo, ok to d/c on oral keflex for 10 days when ready.     Anastasia Cee MD    Interval History   Afebrile, negative blood cultures   No new complaints   S/p partial ray amputation    Physical Exam   Temp: 98  F (36.7  C) Temp src: Oral BP: 166/72 Pulse: 79 Heart Rate: 72 Resp: 16 SpO2: 96 % O2 Device: None (Room air)    Vitals:    06/04/17 0500 06/05/17 0713 06/06/17 0628   Weight: 75.1 kg (165 lb 9.1 oz) 77.1 kg (169 lb 15.6 oz) 62.3 kg (137 lb 5.6 oz)     Vital Signs with Ranges  Temp:  [97.9  F (36.6  C)-98.6  F (37  C)] 98  F (36.7  C)  Pulse:  [73-92] 79  Heart Rate:  [72-92] 72  Resp:  [16] 16  BP: (143-166)/(61-80) 166/72  SpO2:  [94 %-96 %] 96 %    Constitutional: Awake, alert, cooperative, no apparent distress  Lungs: Clear to auscultation bilaterally, no crackles or wheezing  Cardiovascular: Regular rate and rhythm, normal S1 and S2, and no murmur noted  Abdomen: Normal bowel sounds, soft, non-distended, non-tender  Skin: No rashes, no cyanosis, no edema  Other:    Medications        ceFAZolin  2 g Intravenous Q8H     colchicine  0.6 mg Oral Daily     sodium chloride (PF)  10 mL Intracatheter Q8H     metFORMIN  1,000 mg Oral BID w/meals     insulin glargine  4 Units Subcutaneous At Bedtime     insulin aspart  1-10 Units Subcutaneous TID AC     insulin aspart  1-7 Units Subcutaneous At Bedtime     aspirin  81 mg Oral Daily     atorvastatin (LIPITOR) tablet 80 mg  80 mg Oral Daily     DULoxetine  30 mg Oral Daily     glipiZIDE  10 mg Oral BID AC     ipratropium  2 spray Both Nostrils Q12H     metoprolol  50 mg Oral  Daily     omeprazole  40 mg Oral Daily     psyllium  1 packet Oral Daily     tamsulosin  0.4 mg Oral Daily       Data   All microbiology laboratory data reviewed.  Recent Labs   Lab Test  06/08/17   0613  06/07/17   0640  06/01/17   0656   WBC  8.7  7.0  8.0   HGB  10.9*  10.3*  11.5*   HCT  33.2*  32.0*  35.9*   MCV  93  93  95   PLT  187  178  158     Recent Labs   Lab Test  06/08/17   0613  06/03/17   0655  06/02/17   0707   CR  0.62*  0.68  0.66     Recent Labs   Lab Test  05/31/17   1800   SED  43*     Recent Labs   Lab Test  06/06/17   1846  06/04/17   1027  06/04/17   1016  05/31/17   1930  05/31/17   1905   CULT  Culture negative monitoring continues  Culture negative monitoring continues  Moderate growth Staphylococcus aureus*  Culture negative monitoring continues  Culture negative monitoring continues  Culture negative monitoring continues  No growth  No growth[DS1.1]        Revision History        User Key Date/Time User Provider Type Action    > DS1.1 6/8/2017  2:23 PM Anastasia Cee MD Physician Sign            Progress Notes by Cris Kirk at 6/8/2017  9:57 AM     Author:  Cris Kirk Service:  (none) Author Type:  (none)    Filed:  6/8/2017 10:29 AM Date of Service:  6/8/2017  9:57 AM Creation Time:  6/8/2017  9:57 AM    Status:  Attested :  Cris Kirk    Cosigner:  Chandrika Singh RD, LD at 6/8/2017 12:19 PM        Attestation signed by Chandrika Singh RD, LD at 6/8/2017 12:19 PM        I have reviewed and agree with the following note.  Chandrika Singh RD  Pager [phone] (M-F)            [phone] (W/E & Hol)  ]                                 BRIEF NUTRITION REASSESSMENT      CURRENT DIET AND INTAKE:[AP1.1]  Diet:  Moderate Consistent CHO; Yogurt with meals[AP1.2]   -Pt reports consuming 90%-100% of meals ordered with great appetite   -[AP1.1]Per RN flowsheet, pt consuming 100% of needs[AP1.2]       ANTHROPOMETRICS:[AP1.1]  Vitals:    05/31/17 1724 06/01/17 0500  06/02/17 0652 06/03/17 0653   Weight: 73 kg (161 lb) 75.6 kg (166 lb 11.2 oz) 75.8 kg (167 lb 1.7 oz) 75.8 kg (167 lb 1.7 oz)    06/04/17 0500 06/05/17 0713 06/06/17 0628   Weight: 75.1 kg (165 lb 9.1 oz) 77.1 kg (169 lb 15.6 oz) 62.3 kg (137 lb 5.6 oz)[AP1.3]   -Wt difficult to assess due to inconsistencies in recorded weights[AP1.2]      LABS:[AP1.1]  BG range (6/1-6/8): 107-243  Hg A1c 8.3 (4/21)[AP1.2]      NUTRITION INTERVENTION:  Nutrition Diagnosis:  No nutrition diagnosis at this time.[AP1.1]    Implementation[AP1.2]  Nutrition education- continuing to monitor BG control for improved wound healing     FOLLOW UP/MONITORING:   Will re-evaluate in 7 - 10 days, or sooner, if re-consulted.[AP1.1]    Cris Kirk Clinical Dietitian[AP1.2]          Revision History        User Key Date/Time User Provider Type Action    > AP1.3 6/8/2017 10:29 AM Cris Kirk (none) Sign     AP1.1 6/8/2017 10:23 AM Cris Kirk (none)      AP1.2 6/8/2017  9:57 AM Cris Kirk (none)             Progress Notes by Moe Kirk DPM at 6/8/2017 12:06 PM     Author:  Moe Kirk DPM Service:  Podiatry Author Type:  Podiatrist    Filed:  6/8/2017 12:09 PM Date of Service:  6/8/2017 12:06 PM Creation Time:  6/8/2017 12:06 PM    Status:  Signed :  Moe Kirk DPM (Podiatrist)         [place] [facility]  6/8/17    A/P:  88 y/o male with DM, peripheral neuropathy, status post partial left 5th ray open amputation 6/4 and revision with delayed primary closure 6/6 for treatment of osteomyelitis and abscess. Proximal margin of bone sent to pathology 6/4 is negative for osteomyelitis.      Antibiotic per ID.  Dressing changed in sterile fashion; betadine applied to macerated area.  Will continue to monitor. Would like to see more resolution of cellulitis prior to discharge.  NWB.  Mode per Physical Therapy.  Dr. Copeland to f/u tomorrow.    Moe Kirk DPM,  "FACFAS  Pager: [phone]    S:  Pt seen at bedside.  Feeling well.    O:[MP1.1]  /63 (BP Location: Right arm)  Pulse 79  Temp 98.1  F (36.7  C) (Oral)  Resp 16  Ht 1.651 m (5' 5\")  Wt 62.3 kg (137 lb 5.6 oz)  SpO2 97%  BMI 22.86 kg/m2[MP1.2]  Mild strike-through of serosanguinous drainage on dressing.  Minimal edema.  Incision well coapted and sutures intact.  Some maceration to area of incision with serous drainage, surrounding erythema.    Pathology 6/4:    SPECIMEN(S):   A: Left 5th metatarsal proximal margin   B: Left 5th proximal phalynx and 5th metatarsal head   C: Left 5th toe     FINAL DIAGNOSIS:   A: Bone, left fifth metatarsal, proximal, biopsy   - Nonneoplastic bone, no evidence of osteomyelitis     B: Fifth proximal phalanx, resection   - Bone with serous atrophy and acute osteomyelitis and with soft tissue   adjacent to bone showing acute inflammation     C: Left fifth toe, resection   - Ischemic change     I have reviewed this specimen and edited this report     Electronically signed out by:   Donal Coles M.D.[MP1.1]      Revision History        User Key Date/Time User Provider Type Action    > MP1.2 6/8/2017 12:09 PM Moe Kirk DPM Podiatrist Sign     MP1.1 6/8/2017 12:06 PM Moe Kirk DPM Podiatrist             Progress Notes by Kimber Ramos RN at 6/7/2017  9:28 PM     Author:  Kimber Ramos RN Service:  (none) Author Type:  Registered Nurse    Filed:  6/7/2017  9:30 PM Date of Service:  6/7/2017  9:28 PM Creation Time:  6/7/2017  9:28 PM    Status:  Signed :  Kimber Ramos RN (Registered Nurse)         Patient c/o pain to right 5th finger. Stated it was somewhat sore earlier but now red. Warm and unable to bend. Hospitalist called at 2105.  MD encouraged elevation and ice and to monitor.[SH1.1]      Revision History        User Key Date/Time User Provider Type Action    > SH1.1 6/7/2017  9:30 PM Kimber Ramos, RN Registered Nurse Sign       "      Progress Notes by Tyra Mckeon PA-C at 6/7/2017  9:23 PM     Author:  Tyra Mckeon PA-C Service:  Hospitalist Author Type:  Physician Assistant Camilo CHARLES    Filed:  6/7/2017  9:30 PM Date of Service:  6/7/2017  9:23 PM Creation Time:  6/7/2017  9:23 PM    Status:  Signed :  Tyra Mckeon PA-C (Physician Assistant - C)         Paged by nursing staff about sudden onset right MIP swelling. Patient had progressively worsening right MIP pain and swelling over the past several hours. No injury patient is aware of. Per nursing staff joint was erythematous and warm to touch. On my exam joint is swollen with limited joint mobility secondary to pain. Patient had ice pack over hand so I was unable to feel warmth. No obvious erythema. No pain in joints above or below MIP. Patient taken off Zosyn earlier today after completing 8 day course.     Although this is an unusual location, I am suspecting this may be gout. Will treat with colchicine with repeat BMP and CBC tomorrow AM. Could consider septic arthritis, however, this is lower on my differentials due to being afebrile with now WBC and recently completing course of Zosyn. Continue to monitor.    Tyra Mckeon PA-C  Internal Medicine Hospitalist.    The patient was individually seen and examined by Dr. Whitaker who agrees with the plan as outlined above.[KW1.1]     Revision History        User Key Date/Time User Provider Type Action    > KW1.1 6/7/2017  9:30 PM Tyra Mckeon PA-C Physician Assistant - C Sign            Progress Notes by Faustino Aguilar MD at 6/7/2017 11:05 AM     Author:  Faustino Aguilar MD Service:  Hospitalist Author Type:  Physician    Filed:  6/7/2017  2:38 PM Date of Service:  6/7/2017 11:05 AM Creation Time:  6/7/2017 11:05 AM    Status:  Addendum :  Faustino Aguilar MD (Physician)         [facility]    Internal Medicine Hospitalist Progress  Note  06/07/2017  I evaluated patient on the above date.    Faustino Aguilar Jr., MD  [phone] (p)  Text Page (7 am to 6 pm)      Assessment & Plan Dustin Pearce is a 89 year old male with history including DM2, HTN, CAD, CHF, PAF, CVA, PAD, DLD, GERD and BPH, who presented 5/31 with worsening L toe/foot ulcer/wound.     1.  Left foot infected diabetic and ulcer/wound involving 5th toe with abscess, osteomyelitis and overlying cellulitis - s/p L partial 5th ray amputation 6/4/2017, s/p revision I&D with further resection necrotic tissue 6/6/2017.  * L foot x-ray 5/31 no acute findings. Started on Zosyn 5/31.  * MRI L foot 6/1 showed signs suspicious for osteomyelitis 5th MT head and 5th toe prox phalangeal base; 5th MT joint periarticular fluid signal intensity, possibly representing developing soft tissue abscess; dorsal and deeper soft tissue edema within the forefoot.  * Seen by Podiatry and Vascular.   * Underwent vascular w/u as below and underwent L MEG stenting and L SFA angioplasty on 6/2 and subseuqently felt that there was adequate blood supply for healing.  * S/p L partial 5th ray amputation 6/4/2017.  * S/p revision I&D with resection of necrotic tissue down to and including deep fascia 6/6.   * Micro: BC's 5/31 NGTD. Tissue cultures L foot 6/4 negative. Abcess cultures L foot 6/4 growing mod growth MSSA. L foot cultures 6/6 pending.  - Continue Zosyn (started 5/31).  - Monitor cultures.  - Post-op mgmt and further surgery per Podiatry.  - ID following, apprec help.     2. Peripheral arterial disease with nonhealing, infected wound - s/p L MEG stent and L SFA angioplasty on 6/2.  * H/o carotid disease.  * Seen by Vascular this stay.  * KRYSTINA 6/1 suggested significant arterial insufficiency.   * Carotid US 6/2 showed 50-79& in R and occlusion of the L. LE angiogram 6/2 showed findings including significant stenosis found in left common iliac artery which was stented with a 10mm stent; L SFA stenosis noted  and was angioplastied w 4 mm balloon.  - Continue ASA.  - F/u with Vascular 3 months.     3. DM II with peripheral neuropathy - uncontrolled.  [PTA: glipizide 10 mg BID, metformin 1000 mg BID.]  * Hgb A1C 8.3 4/2017.  * Lantus started 6/3.   Recent Labs   Lab Test  06/07/17   0631  06/07/17   0148  06/06/17   2129  06/06/17   1632  06/06/17   1355  06/06/17   1237   06/01/17   0656   05/31/17   1800   01/20/17   1030  01/16/17   1033   GLC   --    --    --    --   140*   --    --   164*   --   249*   --   193*  337*   BGM  107*  122*  139*  109*   --   184*   < >   --    < >   --    < >   --    --     < > = values in this interval not displayed.   - Continue glipizide 10 mg BID.  - Continue Lantus 4U qHS.  - Continue metformin 1000 mg BID.  - Continue ISS.  - After discharge, f/u with PCP regarding adjusting oral regimen; alternatively, consider d/c home with Lantus.    4. CAD, CHF.  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 Echo 1/2017 showed LVEF 35-40%. Nuc stress 3/2017 showed findings consistent with prior MI in the RCA/cirumflex distribution with mild ezekiel-infarct ischemia; EF 37% at rest and 28% post stress.  - Hold Plavix until surgeries done.  - Continue ASA, atorvastatin, metoprolol.  - Hold lisinopril for now.  - Monitor i/o's, daily wts.    5. Hypertension (benign essential).  [PTA: lisinopril 2.5 mg daily, metoprolol XL 50 mg daily.]  - Continue metoprolol XL 50 mg daily.  - Hold lisinopril for now.    6. Cerebrovascular disease.  * H/o CVI.  - Continue ASA.  - Should be on AC, but defer further discussions outpt.     7. Afib  - Continue ASA, metoprolol.  - Should be on AC, but defer further discussions outpt.     8. Depression.  - Continue amitryptiline and duloxetine.      9. GERD.  - Continue omeprazole.     10. BPH.  - Continue Flomax.      Prophylaxis.  - PCD's.    CODE STATUS: FULL    Dispo.  - Pending above.  -  "Possible d/c to TCU 1-2 days if wound stable.    Interval History[GC1.1]   Doing OK. Offers no c/o's. Denies pain.[GC1.2]    -Data reviewed today: I reviewed all new labs and imaging over the last 24 hours. I personally reviewed no images or EKG's today.    Physical Exam   Heart Rate: 79, Blood pressure 140/54, pulse 65, temperature 97.9  F (36.6  C), temperature source Oral, resp. rate 16, height 1.651 m (5' 5\"), weight 62.3 kg (137 lb 5.6 oz), SpO2 97 %.  Vitals:    06/04/17 0500 06/05/17 0713 06/06/17 0628   Weight: 75.1 kg (165 lb 9.1 oz) 77.1 kg (169 lb 15.6 oz) 62.3 kg (137 lb 5.6 oz)     Vital Signs with Ranges  Temp:  [97.5  F (36.4  C)-99.3  F (37.4  C)] 97.9  F (36.6  C)  Pulse:  [] 65  Heart Rate:  [65-79] 79  Resp:  [16-18] 16  BP: (135-154)/(54-76) 140/54  SpO2:  [93 %-99 %] 97 %  Patient Vitals for the past 24 hrs:   BP Temp Temp src Pulse Heart Rate Resp SpO2   06/07/17 0700 140/54 97.9  F (36.6  C) Oral - 79 16 97 %   06/07/17 0447 143/65 98  F (36.7  C) Oral - 67 16 99 %   06/07/17 0000 154/76 98.2  F (36.8  C) Oral - 66 16 98 %   06/06/17 2300 154/65 - - - 65 18 99 %   06/06/17 2200 154/67 - - 65 - - 99 %   06/06/17 2121 - - - - - - 93 %   06/06/17 2100 142/65 - - 63 - 18 99 %   06/06/17 2030 149/64 - - 64 - 18 96 %   06/06/17 1953 150/62 98  F (36.7  C) Oral 65 - 18 96 %   06/06/17 1903 - 99.3  F (37.4  C) Temporal - - 18 -   06/06/17 1634 152/54 98.6  F (37  C) Oral 105 78 18 96 %   06/06/17 1120 135/55 97.5  F (36.4  C) Oral - 72 16 95 %     I/O's Last 24 hours  I/O last 3 completed shifts:  In: 1120 [P.O.:600; I.V.:520]  Out: 551 [Urine:550; Blood:1]    Constitutional:[GC1.1] Sleepy, appears comfortable[GC1.2].  Respiratory: Clear, no crackles/wheezing.  Cardiovascular: Fairly regular, no m/r/g.  GI:[GC1.1] Soft, nt, +BS.[GC1.2]  Skin/Integumen:   Other:        Data     Recent Labs  Lab 06/07/17  0640 06/06/17  1355 06/04/17  0830 06/03/17  0655 06/02/17  0707 06/01/17  0656 " 05/31/17  1800   WBC 7.0  --   --   --   --  8.0 9.5   HGB 10.3*  --   --   --   --  11.5* 12.4*   MCV 93  --   --   --   --  95 96     --   --   --   --  158 192   NA  --   --   --   --   --  141 139   POTASSIUM  --  3.6 4.1  --   --  4.6 4.4   CHLORIDE  --   --   --   --   --  105 103   CO2  --   --   --   --   --  33* 30   BUN  --   --   --   --   --  12 15   CR  --   --   --  0.68 0.66 0.64* 0.62*   ANIONGAP  --   --   --   --   --  3 6   ALLISON  --   --   --   --   --  8.7 8.8   GLC  --  140*  --   --   --  164* 249*     Recent Labs   Lab Test  06/07/17   0631  06/07/17   0148  06/06/17   2129  06/06/17   1632  06/06/17   1355  06/06/17   1237   06/01/17   0656   05/31/17   1800   01/20/17   1030  01/16/17   1033   GLC   --    --    --    --   140*   --    --   164*   --   249*   --   193*  337*   BGM  107*  122*  139*  109*   --   184*   < >   --    < >   --    < >   --    --     < > = values in this interval not displayed.         No results found for this or any previous visit (from the past 24 hour(s)).    Medications   All medications were reviewed.       sodium chloride (PF)  10 mL Intracatheter Q8H     sodium chloride (PF)  10 mL Intracatheter Q8H     sodium chloride (PF)  3 mL Intracatheter Q8H     metFORMIN  1,000 mg Oral BID w/meals     insulin glargine  4 Units Subcutaneous At Bedtime     insulin aspart  1-10 Units Subcutaneous TID AC     insulin aspart  1-7 Units Subcutaneous At Bedtime     aspirin  81 mg Oral Daily     atorvastatin (LIPITOR) tablet 80 mg  80 mg Oral Daily     DULoxetine  30 mg Oral Daily     glipiZIDE  10 mg Oral BID AC     ipratropium  2 spray Both Nostrils Q12H     metoprolol  50 mg Oral Daily     omeprazole  40 mg Oral Daily     psyllium  1 packet Oral Daily     tamsulosin  0.4 mg Oral Daily     piperacillin-tazobactam  3.375 g Intravenous Q6H[GC1.1]          Revision History        User Key Date/Time User Provider Type Action    > [N/A] 6/7/2017  2:38 PM Faustino Aguilar  MD Gonzalo Physician Addend     GC1.2 6/7/2017 11:11 AM Faustino Aguilar MD Physician Sign     GC1.1 6/7/2017 11:05 AM Faustino Aguilar MD Physician             Progress Notes by Matt Sultana DPM at 6/7/2017  1:44 PM     Author:  Matt Sultana DPM Service:  Podiatry Author Type:  Physician    Filed:  6/7/2017  1:50 PM Date of Service:  6/7/2017  1:44 PM Creation Time:  6/7/2017  1:44 PM    Status:  Signed :  Matt Sultana DPM (Physician)         [place] [facility]      Assessment:  89 y/o male with DM, peripheral neuropathy, status post partial left 5th ray open amputation 6/4 and revision with delayed primary closure 6/6 for treatment of osteomyelitis and abscess.     Proximal margin of bone sent to pathology 6/4 is negative for osteomyelitis.   Therefore we can be confident all bone infection was removed.    Plan:  Antibiotic per ID; Ancef  Dressing as changed in a sterile fashion  Will continue to monitor. Would like to see more resolution of cellulitis prior to discharge  NWB is best.  Mode per Physical Therapy  Dr. Kirk will follow up tomorrow.     Exam:   Mild strike-through of serosanguinous drainage on dressing  Minimal edema  Incision well coapted and sutures intact    Pathology 6/4:    SPECIMEN(S):   A: Left 5th metatarsal proximal margin   B: Left 5th proximal phalynx and 5th metatarsal head   C: Left 5th toe     FINAL DIAGNOSIS:   A: Bone, left fifth metatarsal, proximal, biopsy   - Nonneoplastic bone, no evidence of osteomyelitis     B: Fifth proximal phalanx, resection   - Bone with serous atrophy and acute osteomyelitis and with soft tissue   adjacent to bone showing acute inflammation     C: Left fifth toe, resection   - Ischemic change     I have reviewed this specimen and edited this report     Electronically signed out by:   Donal Coles M.D.[MR1.1]      Revision History        User Key Date/Time User Provider Type Action    > MR1.1  6/7/2017  1:50 PM Matt Sultana DPM Physician Sign            Progress Notes by Anastasia Cee MD at 6/7/2017 11:13 AM     Author:  Anastasia Cee MD Service:  Infectious Disease Author Type:  Physician    Filed:  6/7/2017  1:03 PM Date of Service:  6/7/2017 11:13 AM Creation Time:  6/7/2017 11:13 AM    Status:  Signed :  Anastasia Cee MD (Physician)         [facility]    Infectious Disease Progress Note    Date of Service (when I saw the patient): 6/5/17     Assessment & Plan   Dustin Pearce is a 89 year old male who was admitted on 5/31/2017.     Impression:   89 y.o male with DM, peripheral neuropathy.   Admitted with worsening in the non healing left foot ulcer.   MRI concerning for osteo.   S/P partial amputation.   OR cultures from tissue positive for MSSA, pathology pending.      Recommendations:   Switch to Ancef.     Anastasia Cee MD    Interval History   Afebrile, negative blood cultures   No new complaints   S/p partial ray amputation    Physical Exam   Temp: 97.9  F (36.6  C) Temp src: Oral BP: 140/54 Pulse: 65 Heart Rate: 79 Resp: 16 SpO2: 97 % O2 Device: Nasal cannula Oxygen Delivery: 2 LPM  Vitals:    06/04/17 0500 06/05/17 0713 06/06/17 0628   Weight: 75.1 kg (165 lb 9.1 oz) 77.1 kg (169 lb 15.6 oz) 62.3 kg (137 lb 5.6 oz)     Vital Signs with Ranges  Temp:  [97.5  F (36.4  C)-99.3  F (37.4  C)] 97.9  F (36.6  C)  Pulse:  [] 65  Heart Rate:  [65-79] 79  Resp:  [16-18] 16  BP: (135-154)/(54-76) 140/54  SpO2:  [93 %-99 %] 97 %    Constitutional: Awake, alert, cooperative, no apparent distress  Lungs: Clear to auscultation bilaterally, no crackles or wheezing  Cardiovascular: Regular rate and rhythm, normal S1 and S2, and no murmur noted  Abdomen: Normal bowel sounds, soft, non-distended, non-tender  Skin: No rashes, no cyanosis, no edema  Other:    Medications        ceFAZolin  2 g Intravenous Q8H     sodium chloride (PF)  10 mL Intracatheter Q8H     sodium chloride  (PF)  10 mL Intracatheter Q8H     sodium chloride (PF)  3 mL Intracatheter Q8H     metFORMIN  1,000 mg Oral BID w/meals     insulin glargine  4 Units Subcutaneous At Bedtime     insulin aspart  1-10 Units Subcutaneous TID AC     insulin aspart  1-7 Units Subcutaneous At Bedtime     aspirin  81 mg Oral Daily     atorvastatin (LIPITOR) tablet 80 mg  80 mg Oral Daily     DULoxetine  30 mg Oral Daily     glipiZIDE  10 mg Oral BID AC     ipratropium  2 spray Both Nostrils Q12H     metoprolol  50 mg Oral Daily     omeprazole  40 mg Oral Daily     psyllium  1 packet Oral Daily     tamsulosin  0.4 mg Oral Daily       Data   All microbiology laboratory data reviewed.  Recent Labs   Lab Test  06/07/17   0640  06/01/17   0656  05/31/17   1800   WBC  7.0  8.0  9.5   HGB  10.3*  11.5*  12.4*   HCT  32.0*  35.9*  38.5*   MCV  93  95  96   PLT  178  158  192     Recent Labs   Lab Test  06/03/17   0655  06/02/17   0707  06/01/17   0656   CR  0.68  0.66  0.64*     Recent Labs   Lab Test  05/31/17   1800   SED  43*     Recent Labs   Lab Test  06/06/17   1846  06/04/17   1027  06/04/17   1016  05/31/17   1930  05/31/17   1905   CULT  Pending  Pending  Moderate growth Staphylococcus aureus*  Culture negative monitoring continues  Culture negative monitoring continues  Culture negative monitoring continues  No growth  No growth[DS1.1]        Revision History        User Key Date/Time User Provider Type Action    > DS1.1 6/7/2017  1:03 PM Anastasia Cee MD Physician Sign            Progress Notes by Elda Edwards, RN at 6/7/2017 12:09 PM     Author:  Elda Edwards, RN Service:  Ancillary, Case Management Author Type:  Registered Nurse    Filed:  6/7/2017 12:11 PM Date of Service:  6/7/2017 12:09 PM Creation Time:  6/7/2017 12:09 PM    Status:  Signed :  Elda Edwards, RN (Registered Nurse)         Received call from pt's significant other, Halina ([phone]).  She would like to be involved in the discharge planning  process, and is listed as pt's caregiver on d/c paperwork.  She lives at the same address, and states she does have a POA document in a safe--I requested she bring this in to be scanned.  Updated SW that Halina would like to be involved in d/c planning.    Elda ARAUJO[LH1.1]      Revision History        User Key Date/Time User Provider Type Action    > LH1.1 6/7/2017 12:11 PM Elad Edwards, RN Registered Nurse Sign            Progress Notes by Alba Dimas LSW at 6/7/2017  8:41 AM     Author:  Alba Dimas LSW Service:  (none) Author Type:      Filed:  6/7/2017  8:42 AM Date of Service:  6/7/2017  8:41 AM Creation Time:  6/7/2017  8:41 AM    Status:  Signed :  Alba Dimas LSW ()         SW  I: SW was updated that [facility] has a bed available for patient. SW passed info on to 55 SW.    P: SW will continue to follow and assist as needed.    ROYA Aguirre   *[id number][SM1.1]       Revision History        User Key Date/Time User Provider Type Action    > SM1.1 6/7/2017  8:42 AM Alba Dimas LSW  Sign            Progress Notes by Alba Dimas LSW at 6/6/2017  3:19 PM     Author:  Alba Dimas LSW Service:  (none) Author Type:      Filed:  6/6/2017  3:23 PM Date of Service:  6/6/2017  3:19 PM Creation Time:  6/6/2017  3:19 PM    Status:  Signed :  Alba Dimas LSW ()         Care Transition Initial Assessment -   Reason For Consult: discharge planning  Met with: PATIENT    Active Problems:    Coronary artery disease involving native coronary artery of native heart without angina pectoris    Type 2 diabetes mellitus with diabetic peripheral angiopathy without gangrene, without long-term current use of insulin (H)    Benign non-nodular prostatic hyperplasia with lower urinary tract symptoms    Gastroesophageal reflux disease without esophagitis    Benign essential hypertension     Paroxysmal atrial fibrillation (H)    Cardiomyopathy (H)    Diabetic foot ulcer (H)         DATA  Lives With: friend(s)  Living Arrangements: house  Description of Support System: Supportive, Involved  Who is your support system?: Other (specify) (friend)  Support Assessment: Adequate family and caregiver support.  Identified issues/concerns regarding health management: Patient will need TCU at d/c  Quality Of Family Relationships: supportive, involved  Transportation Available: family or friend will provide    ASSESSMENT  Cognitive Status: Alert and oriented X4  Concerns to be addressed: Patient is a 89 year old male who was admitted to the hospital for a diabetic foot ulcer. Prior to hospitalization patient was living at home where he was managing well. Patient is aware that TCU is being recommended. Patient had expressed interest in [facility], [facility], [facility]. SW sent referral to facility anticipating patient will d.c 6/7-6/8. SW will update patient once assessments are complete.     PLAN  Financial costs for the patient includes   Patient given options and choices for discharge to TCU  Patient/family is agreeable to the plan?  YES  Patient Goals and Preferences: [facility], [facility], [facility]  Patient anticipates discharging to:  TCU  Discharge Planner   Discharge Plans in progress: ref sent to TCU facilities  Barriers to discharge plan:   Follow up plan: update once assessments are complete       Entered by: Alba Dimas 06/06/2017 3:19 PM[SM1.1]              Revision History        User Key Date/Time User Provider Type Action    > SM1.1 6/6/2017  3:23 PM Alba Dimas LSW  Sign            Progress Notes by Faustino Aguilar MD at 6/6/2017  2:40 PM     Author:  Faustino Aguilar MD Service:  Hospitalist Author Type:  Physician    Filed:  6/6/2017  3:03 PM Date of Service:  6/6/2017  2:40 PM Creation Time:  6/6/2017  2:40 PM    Status:  Signed :  Faustino Aguilar MD (Physician)          [facility]    Internal Medicine Hospitalist Progress Note  06/06/2017  I evaluated patient on the above date.    Faustino Aguilar Jr., MD  [phone] (p)  Text Page (7 am to 6 pm)      Assessment & Plan Dustin Pearce is a 89 year old male with history including DM2, HTN, CAD, CHF, PAF, CVA, PAD, DLD, GERD and BPH, who presented 5/31 with worsening L toe/foot ulcer/wound.     1.  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  * L foot x-ray 5/31 no acute findings. Started on Zosyn 5/31.  * MRI L foot 6/1 showed signs suspicious for osteomyelitis 5th MT head and 5th toe prox phalangeal base; 5th MT joint periarticular fluid signal intensity, possibly representing developing soft tissue abscess; dorsal and deeper soft tissue edema within the forefoot.  * Seen by Podiatry and Vascular.   * Underwent vascular w/u as below and underwent L MEG stenting and L SFA angioplasty on 6/2 and subseuqently felt that there was adequate blood supply for healing.  * S/p L partial 5th ray amputation 6/4/2017.  * Micro: BC's 5/31 NGTD. Tissue cultures L foot 6/4 pending. Abcess cultures L foot 6/4 pending.  - Plan[GC1.1] would[GC1.2] closure[GC1.1] today 6/6[GC1.2].  - Continue Zosyn (started 5/31).  - Monitor cultures.  - Apprec help from Podiatry.  - ID following, apprec help.     2. Peripheral arterial disease with nonhealing, infected wound - s/p L MEG stent and L SFA angioplasty on 6/2.  * H/o carotid disease.  * Seen by Vascular this stay.  * KRYSTINA 6/1 suggested significant arterial insufficiency.   * Carotid US 6/2 showed 50-79& in R and occlusion of the L. LE angiogram 6/2 showed findings including significant stenosis found in left common iliac artery which was stented with a 10mm stent; L SFA stenosis noted and was angioplastied w 4 mm balloon.  - Continue ASA.  - F/u with Vascular 3 months.     3.  DM II with peripheral neuropathy - uncontrolled.  [PTA: glipizide 10 mg BID, metformin 1000 mg BID.]  * Hgb A1C 8.3 4/2017.  * Lantus started 6/3.   Recent Labs   Lab Test  06/06/17   1355  06/06/17   1237  06/06/17   0750  06/06/17   0204  06/05/17   2146  06/05/17   1730   06/01/17   0656   05/31/17   1800   01/20/17   1030  01/16/17   1033   GLC  140*   --    --    --    --    --    --   164*   --   249*   --   193*  337*   BGM   --   184*  134*  114*  141*  80   < >   --    < >   --    < >   --    --     < > = values in this interval not displayed.   - Continue glipizide 10 mg BID.  - Continue Lantus 4U qHS.  -[GC1.1] Continue[GC1.2] metformin 1000 mg BID.  - Continue ISS.  - After discharge, f/u with PCP regarding adjusting oral regimen; alternatively, consider d/c home with Lantus.    4. CAD, CHF.  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 Echo 1/2017 showed LVEF 35-40%. Nuc stress 3/2017 showed findings consistent with prior MI in the RCA/cirumflex distribution with mild ezekiel-infarct ischemia; EF 37% at rest and 28% post stress.  - Hold Plavix until surgeries done.  - Continue ASA, atorvastatin, metoprolol.  - Hold lisinopril for now.  - Monitor i/o's, daily wts.    5. Hypertension (benign essential).  [PTA: lisinopril 2.5 mg daily, metoprolol XL 50 mg daily.]  - Continue metoprolol XL 50 mg daily.  - Hold lisinopril for now.    6. Cerebrovascular disease.  * H/o CVI.  - Continue ASA.  - Should be on AC, but defer further discussions outpt.     7. Afib  - Continue ASA, metoprolol.  - Should be on AC, but defer further discussions outpt.     8. Depression.  - Continue amitryptiline and duloxetine.      9. GERD.  - Continue omeprazole.     10. BPH.  - Continue Flomax.      Prophylaxis.  - PCD's.    CODE STATUS: FULL    Dispo.  - Pending above.  -[GC1.1] Possible d/c to TCU 6/7 if wound stable.[GC1.2]    Interval  "History[GC1.1]   Doing OK[GC1.2].[GC1.1]  No CP or SOB.[GC1.2]      -Data reviewed today: I reviewed all new labs and imaging over the last 24 hours. I personally reviewed no images or EKG's today.    Physical Exam   Heart Rate: 72, Blood pressure 135/55, pulse 74, temperature 97.5  F (36.4  C), temperature source Oral, resp. rate 16, height 1.651 m (5' 5\"), weight 62.3 kg (137 lb 5.6 oz), SpO2 95 %.  Vitals:    06/04/17 0500 06/05/17 0713 06/06/17 0628   Weight: 75.1 kg (165 lb 9.1 oz) 77.1 kg (169 lb 15.6 oz) 62.3 kg (137 lb 5.6 oz)     Vital Signs with Ranges  Temp:  [96.6  F (35.9  C)-98.9  F (37.2  C)] 97.5  F (36.4  C)  Pulse:  [72-74] 74  Heart Rate:  [72-73] 72  Resp:  [15-16] 16  BP: (135-163)/(55-65) 135/55  SpO2:  [94 %-99 %] 95 %  Patient Vitals for the past 24 hrs:   BP Temp Temp src Pulse Heart Rate Resp SpO2 Weight   06/06/17 1120 135/55 97.5  F (36.4  C) Oral - 72 16 95 % -   06/06/17 0730 155/64 98.9  F (37.2  C) Oral - 72 16 94 % -   06/06/17 0700 - - - - - - 95 % -   06/06/17 0628 - - - - - - - 62.3 kg (137 lb 5.6 oz)   06/06/17 0110 163/65 96.9  F (36.1  C) Oral - 73 15 96 % -   06/05/17 2027 141/60 96.6  F (35.9  C) Oral 74 - 16 99 % -   06/05/17 1721 153/57 97.5  F (36.4  C) Oral 72 - 16 96 % -     I/O's Last 24 hours  I/O last 3 completed shifts:  In: 1976 [P.O.:600; I.V.:1376]  Out: -     Constitutional: Alert, oriented, pleasant.  Respiratory: Clear, no crackles/wheezing.  Cardiovascular: Fairly regular, no m/r/g.  GI:   Skin/Integumen:   Other:        Data     Recent Labs  Lab 06/06/17  1355 06/04/17  0830 06/03/17  0655 06/02/17  0707 06/01/17  0656 05/31/17  1800   WBC  --   --   --   --  8.0 9.5   HGB  --   --   --   --  11.5* 12.4*   MCV  --   --   --   --  95 96   PLT  --   --   --   --  158 192   NA  --   --   --   --  141 139   POTASSIUM 3.6 4.1  --   --  4.6 4.4   CHLORIDE  --   --   --   --  105 103   CO2  --   --   --   --  33* 30   BUN  --   --   --   --  12 15   CR  --   --  " 0.68 0.66 0.64* 0.62*   ANIONGAP  --   --   --   --  3 6   ALLISON  --   --   --   --  8.7 8.8   *  --   --   --  164* 249*     Recent Labs   Lab Test  06/06/17   1355  06/06/17   1237  06/06/17   0750  06/06/17   0204  06/05/17   2146  06/05/17   1730   06/01/17   0656   05/31/17   1800   01/20/17   1030  01/16/17   1033   GLC  140*   --    --    --    --    --    --   164*   --   249*   --   193*  337*   BGM   --   184*  134*  114*  141*  80   < >   --    < >   --    < >   --    --     < > = values in this interval not displayed.         No results found for this or any previous visit (from the past 24 hour(s)).    Medications   All medications were reviewed.       sodium chloride (PF)  10 mL Intracatheter Q8H     sodium chloride (PF)  10 mL Intracatheter Q8H     metFORMIN  1,000 mg Oral BID w/meals     insulin glargine  4 Units Subcutaneous At Bedtime     insulin aspart  1-10 Units Subcutaneous TID AC     insulin aspart  1-7 Units Subcutaneous At Bedtime     aspirin  81 mg Oral Daily     atorvastatin (LIPITOR) tablet 80 mg  80 mg Oral Daily     DULoxetine  30 mg Oral Daily     glipiZIDE  10 mg Oral BID AC     ipratropium  2 spray Both Nostrils Q12H     metoprolol  50 mg Oral Daily     omeprazole  40 mg Oral Daily     psyllium  1 packet Oral Daily     tamsulosin  0.4 mg Oral Daily     piperacillin-tazobactam  3.375 g Intravenous Q6H[GC1.1]          Revision History        User Key Date/Time User Provider Type Action    > GC1.2 6/6/2017  3:03 PM Faustino Aguilar MD Physician Sign     GC1.1 6/6/2017  2:40 PM Faustino Aguilar MD Physician             Progress Notes by Anastasia Cee MD at 6/6/2017  9:11 AM     Author:  Anastasia Cee MD Service:  Infectious Disease Author Type:  Physician    Filed:  6/6/2017 10:30 AM Date of Service:  6/6/2017  9:11 AM Creation Time:  6/6/2017  9:11 AM    Status:  Signed :  Anastasia Cee MD (Physician)         [facility]    Infectious Disease  Progress Note    Date of Service (when I saw the patient): 6/5/17     Assessment & Plan   Dustin Pearce is a 89 year old male who was admitted on 5/31/2017.     Impression:   89 y.o male with DM, peripheral neuropathy.   Admitted with worsening in the non healing left foot ulcer.   MRI concerning for osteo.   S/P partial amputation.   OR cultures gram stain positive for GPC, Staph aureus pending Anupam. Bone cultures pending.      Recommendations:   Continue on Zosyn   Will continue to follow surgical plan, cultures.     Anastasia Cee MD    Interval History   Afebrile, negative blood cultures   No new complaints   S/p partial ray amputation    Physical Exam   Temp: 98.9  F (37.2  C) Temp src: Oral BP: 155/64 Pulse: 74 Heart Rate: 72 Resp: 16 SpO2: 94 % O2 Device: None (Room air)    Vitals:    06/04/17 0500 06/05/17 0713 06/06/17 0628   Weight: 75.1 kg (165 lb 9.1 oz) 77.1 kg (169 lb 15.6 oz) 62.3 kg (137 lb 5.6 oz)     Vital Signs with Ranges  Temp:  [96.6  F (35.9  C)-98.9  F (37.2  C)] 98.9  F (37.2  C)  Pulse:  [72-74] 74  Heart Rate:  [62-73] 72  Resp:  [15-16] 16  BP: (124-163)/(50-65) 155/64  SpO2:  [94 %-99 %] 94 %    Constitutional: Awake, alert, cooperative, no apparent distress  Lungs: Clear to auscultation bilaterally, no crackles or wheezing  Cardiovascular: Regular rate and rhythm, normal S1 and S2, and no murmur noted  Abdomen: Normal bowel sounds, soft, non-distended, non-tender  Skin: No rashes, no cyanosis, no edema  Other:    Medications        metFORMIN  1,000 mg Oral BID w/meals     insulin glargine  4 Units Subcutaneous At Bedtime     insulin aspart  1-10 Units Subcutaneous TID AC     insulin aspart  1-7 Units Subcutaneous At Bedtime     aspirin  81 mg Oral Daily     atorvastatin (LIPITOR) tablet 80 mg  80 mg Oral Daily     DULoxetine  30 mg Oral Daily     glipiZIDE  10 mg Oral BID AC     ipratropium  2 spray Both Nostrils Q12H     metoprolol  50 mg Oral Daily     omeprazole  40 mg Oral Daily      psyllium  1 packet Oral Daily     tamsulosin  0.4 mg Oral Daily     sodium chloride (PF)  3 mL Intracatheter Q8H     piperacillin-tazobactam  3.375 g Intravenous Q6H       Data   All microbiology laboratory data reviewed.  Recent Labs   Lab Test  06/01/17   0656  05/31/17   1800  03/07/17   2142   WBC  8.0  9.5  11.6*   HGB  11.5*  12.4*  14.0   HCT  35.9*  38.5*  44.1   MCV  95  96  97   PLT  158  192  198     Recent Labs   Lab Test  06/03/17   0655  06/02/17   0707  06/01/17   0656   CR  0.68  0.66  0.64*     Recent Labs   Lab Test  05/31/17   1800   SED  43*     Recent Labs   Lab Test  06/04/17   1027  06/04/17   1016  05/31/17   1930  05/31/17   1905   CULT  Culture negative monitoring continues  Moderate growth Staphylococcus aureus  Culture in progress  *  Culture negative monitoring continues  Culture negative monitoring continues  No growth  No growth[DS1.1]        Revision History        User Key Date/Time User Provider Type Action    > DS1.1 6/6/2017 10:30 AM Anastasia Cee MD Physician Sign            Progress Notes by Anastasia Cee MD at 6/5/2017  9:10 AM     Author:  Anastasia Cee MD Service:  Infectious Disease Author Type:  Physician    Filed:  6/6/2017  9:10 AM Date of Service:  6/5/2017  9:10 AM Creation Time:  6/6/2017  9:10 AM    Status:  Signed :  Anastasia Cee MD (Physician)         [facility]    Infectious Disease Progress Note    Date of Service (when I saw the patient): 6/5/17     Assessment & Plan   Dustin Pearce is a 89 year old male who was admitted on 5/31/2017.     Impression:   89 y.o male with DM, peripheral neuropathy.   Admitted with worsening in the non healing left foot ulcer.   MRI concerning for osteo.   S/P partial amputation.   OR cultures gram stain positive for GPC, cultures pending.       Recommendations:   Continue on Zosyn   Will continue to follow surgical plan, cultures.     Anastasia Cee MD    Interval History   Afebrile, negative blood cultures    No new complaints   S/p partial ray amputation    Physical Exam   Temp: 98.9  F (37.2  C) Temp src: Oral BP: 155/64 Pulse: 74 Heart Rate: 72 Resp: 16 SpO2: 94 % O2 Device: None (Room air)    Vitals:    06/04/17 0500 06/05/17 0713 06/06/17 0628   Weight: 75.1 kg (165 lb 9.1 oz) 77.1 kg (169 lb 15.6 oz) 62.3 kg (137 lb 5.6 oz)     Vital Signs with Ranges  Temp:  [96.6  F (35.9  C)-98.9  F (37.2  C)] 98.9  F (37.2  C)  Pulse:  [72-74] 74  Heart Rate:  [62-73] 72  Resp:  [15-16] 16  BP: (124-163)/(50-65) 155/64  SpO2:  [94 %-99 %] 94 %    Constitutional: Awake, alert, cooperative, no apparent distress  Lungs: Clear to auscultation bilaterally, no crackles or wheezing  Cardiovascular: Regular rate and rhythm, normal S1 and S2, and no murmur noted  Abdomen: Normal bowel sounds, soft, non-distended, non-tender  Skin: No rashes, no cyanosis, no edema  Other:    Medications        metFORMIN  1,000 mg Oral BID w/meals     insulin glargine  4 Units Subcutaneous At Bedtime     insulin aspart  1-10 Units Subcutaneous TID AC     insulin aspart  1-7 Units Subcutaneous At Bedtime     aspirin  81 mg Oral Daily     atorvastatin (LIPITOR) tablet 80 mg  80 mg Oral Daily     DULoxetine  30 mg Oral Daily     glipiZIDE  10 mg Oral BID AC     ipratropium  2 spray Both Nostrils Q12H     metoprolol  50 mg Oral Daily     omeprazole  40 mg Oral Daily     psyllium  1 packet Oral Daily     tamsulosin  0.4 mg Oral Daily     sodium chloride (PF)  3 mL Intracatheter Q8H     piperacillin-tazobactam  3.375 g Intravenous Q6H       Data   All microbiology laboratory data reviewed.  Recent Labs   Lab Test  06/01/17   0656  05/31/17   1800  03/07/17   2142   WBC  8.0  9.5  11.6*   HGB  11.5*  12.4*  14.0   HCT  35.9*  38.5*  44.1   MCV  95  96  97   PLT  158  192  198     Recent Labs   Lab Test  06/03/17   0655  06/02/17   0707  06/01/17   0656   CR  0.68  0.66  0.64*     Recent Labs   Lab Test  05/31/17   1800   SED  43*     Recent Labs   Lab Test   "06/04/17   1027  06/04/17   1016  05/31/17   1930  05/31/17   1905   CULT  Culture negative monitoring continues  Moderate growth Staphylococcus aureus  Culture in progress  *  Culture negative monitoring continues  Culture negative monitoring continues  No growth  No growth[DS1.1]        Revision History        User Key Date/Time User Provider Type Action    > DS1.1 6/6/2017  9:10 AM Anastasia Cee MD Physician Sign            Progress Notes by Nicole Copeland DPM, Podiatry/Foot and Ankle Surgery at 6/5/2017  3:52 PM     Author:  Nicole Copeland DPM, Podiatry/Foot and Ankle Surgery Service:  Podiatry Author Type:  Physician    Filed:  6/5/2017  3:58 PM Date of Service:  6/5/2017  3:52 PM Creation Time:  6/5/2017  3:52 PM    Status:  Signed :  Nicole Copeland DPM, Podiatry/Foot and Ankle Surgery (Physician)         Podiatry / Foot and Ankle Surgery Progress Note    June 5, 2017    Subject: Patient was seen at bedside.  Notes no pain to foot today. Denies fever, chills, nausa.     Objective:  Vitals: /50 (BP Location: Left arm)  Pulse 73  Temp 97.6  F (36.4  C) (Oral)  Resp 16  Ht 1.651 m (5' 5\")  Wt 77.1 kg (169 lb 15.6 oz)  SpO2 96%  BMI 28.29 kg/m2  BMI= Body mass index is 28.29 kg/(m^2).     A1C; 8.3      General:  Patient is alert and orientated.  NAD  Dressing is c/d/i. Bloody strikethrough to Kerlix roll. Incision is loosely approximated with 1-2 sutures. Redness has resolved. CFT's < 3 secs.     Cultures:  Bone cultures currently negative.   Wound culture: staph aureus    Pathology: pending    Assessment: 89 yr old diabetic male s/p left partial 5th ray amputation due to osteomyelitis.    Plan:    -POD#1   -Dressing was changed.   -redness has resolved.  -Patient scheduled to go back to OR tomorrow night with Dr. Ann for repeat I&D and flap closure.  -Keep foot and dressing dry.  -continue non weight bearing.   -NPO after 9am tomorrow. Orders placed.     Nicole Copeland, DPM, " Podiatry/Foot and Ankle Surgery  3:52 PM[JB1.1]       Revision History        User Key Date/Time User Provider Type Action    > JB1.1 6/5/2017  3:58 PM Nicole Copeland DPM, Podiatry/Foot and Ankle Surgery Physician Sign            Progress Notes by Marian Corrigan PT at 6/5/2017  2:32 PM     Author:  Marian Corrigan PT Service:  (none) Author Type:  Physical Therapist    Filed:  6/5/2017  2:32 PM Date of Service:  6/5/2017  2:32 PM Creation Time:  6/5/2017  2:32 PM    Status:  Signed :  Marian Corrigan PT (Physical Therapist)            06/05/17 1340   Quick Adds   Type of Visit Initial PT Evaluation   Living Environment   Lives With friend(s)  (Halina)   Living Arrangements house  ([facility])   Number of Stairs to Enter Home 3  (1 railing. )   Number of Stairs Within Home 9  (1 railing. )   Transportation Available family or friend will provide  (Pt's friend Halina provides transportation. )   Living Environment Comment Pt's friend Halina assists with cooking, laundry, and cleaning.    Self-Care   Usual Activity Tolerance good   Current Activity Tolerance moderate   Regular Exercise no   Equipment Currently Used at Home cane, straight   Activity/Exercise/Self-Care Comment Pt owns SEC.    Functional Level Prior   Ambulation 1-->assistive equipment   Transferring 1-->assistive equipment   Fall history within last six months yes   Number of times patient has fallen within last six months 1   Prior Functional Level Comment Pt reports completing functional mobility with use of SEC.    General Information   Onset of Illness/Injury or Date of Surgery - Date 05/31/17   Referring Physician Moe Kirk DPM   Patient/Family Goals Statement Return home.    Pertinent History of Current Problem (include personal factors and/or comorbidities that impact the POC) 89 y/o male presenting with L foot ulcer. Pt is now POD # 3 L MEG stent placement and L SFA angioplasty and POD # 1 partial 5th ray amputation. [facility]  including HTN, CAD, cardiomyopathy, DM, GERD, hyperlipidemia, atrial fibrillation, and osteopenia.    Precautions/Limitations fall precautions   Weight-Bearing Status - LLE nonweight-bearing   General Observations Pt laying in bed upon arrival of therapist.    General Info Comments Up ad mary grace.    Cognitive Status Examination   Orientation orientation to person, place and time   Level of Consciousness alert   Follows Commands and Answers Questions 75% of the time   Personal Safety and Judgment at risk behaviors demonstrated  (Pt impulsive at times with transfers. )   Pain Assessment   Patient Currently in Pain No   Integumentary/Edema   Integumentary/Edema Comments L foot covered with dressing.    Posture    Posture Comments Noted forward head and shoulder posture upon sitting EOB and standing at FWW.    Range of Motion (ROM)   ROM Comment B LEs WFL, limited R ankle ROM secondary to bandage.    Strength   Strength Comments Not formally assessed. Pt demonstrates at least 3/5 grossly in B LEs with movement in supine and functional mobility.    Bed Mobility   Bed Mobility Comments Supine-sit, SBA with HOB elevated.    Transfer Skills   Transfer Comments Sit <> stand with FWW and CGA.    Gait   Gait Comments Pt amb a couple steps towards bedside recliner with use of FWW and Oni. Pt demonstrated ability to maintain L NWB restriction with cues.    Balance   Balance Comments Noted good sitting and standing balance at FWW.    Sensory Examination   Sensory Perception Comments Pt denied numbness/tingling in B LEs.    General Therapy Interventions   Planned Therapy Interventions bed mobility training;gait training;ROM;strengthening;transfer training   Clinical Impression   Criteria for Skilled Therapeutic Intervention yes, treatment indicated   PT Diagnosis Difficulty with gait.    Influenced by the following impairments Pain, LE weakness, L NWB restriction, Decreased activity tolerance   Functional limitations due to  "impairments Limited functional mobility requiring AD and assist.    Clinical Presentation Stable/Uncomplicated   Clinical Presentation Rationale Based on PMH, current presentation, and social support.    Clinical Decision Making (Complexity) Low complexity   Therapy Frequency` daily   Predicted Duration of Therapy Intervention (days/wks) 4 days   Anticipated Discharge Disposition Transitional Care Facility   Risk & Benefits of therapy have been explained Yes   Patient, Family & other staff in agreement with plan of care Yes   [facility] AM-PAC  \"6 Clicks\" V.2 Basic Mobility Inpatient Short Form   1. Turning from your back to your side while in a flat bed without using bedrails? 4 - None   2. Moving from lying on your back to sitting on the side of a flat bed without using bedrails? 3 - A Little   3. Moving to and from a bed to a chair (including a wheelchair)? 3 - A Little   4. Standing up from a chair using your arms (e.g., wheelchair, or bedside chair)? 3 - A Little   5. To walk in hospital room? 2 - A Lot   6. Climbing 3-5 steps with a railing? 1 - Total   Basic Mobility Raw Score (Score out of 24.Lower scores equate to lower levels of function) 16   Total Evaluation Time   Total Evaluation Time (Minutes) 10[JH1.1]        Revision History        User Key Date/Time User Provider Type Action    > JH1.1 6/5/2017  2:32 PM Marian Corrigan, PT Physical Therapist Sign            Progress Notes by Kendra Holder at 6/5/2017 12:21 PM     Author:  Kendra Holder Service:  Spiritual Health Author Type:      Filed:  6/5/2017 12:24 PM Date of Service:  6/5/2017 12:21 PM Creation Time:  6/5/2017 12:21 PM    Status:  Signed :  Kendra Holder ()         SPIRITUAL HEALTH SERVICES Progress Note  [facility]    Follow-up visit with pt by consult request.  Pt says that he is feeling much better after his procedure yesterday.  He speaks of the support he receives from his life partner, his son, " his daughter, and friends.  He told the stories of his conversion to Catholicism, [place], and of his adult children's successes.  Pt's outlook for his recovery is positive.  SH provided emotional support.    SH remains available upon request.  No plan for follow-up at this time.                                                                                                                                           Micha Brown Resident  Pager [phone][ED1.1]     Revision History        User Key Date/Time User Provider Type Action    > ED1.1 6/5/2017 12:24 PM Kendra Holder Sign            Progress Notes by Faustino Aguilar MD at 6/5/2017 11:04 AM     Author:  Faustino Aguilar MD Service:  Hospitalist Author Type:  Physician    Filed:  6/5/2017 11:43 AM Date of Service:  6/5/2017 11:04 AM Creation Time:  6/5/2017 11:04 AM    Status:  Signed :  Faustino Aguilar MD (Physician)         [facility]    Internal Medicine Hospitalist Progress Note  06/05/2017  I evaluated patient on the above date.    Faustino Aguilar Jr., MD  [phone] (p)  Text Page (7 am to 6 pm)      Assessment & Plan Dustin Pearce is a 89 year old male with history including DM2, HTN, CAD, CHF, PAF, CVA, PAD, DLD, GERD and BPH, who presented 5/31 with worsening L[GC1.1] toe/foot[GC1.2] ulcer/wound.     1.  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  * L foot x-ray 5/31 no acute findings. Started on Zosyn 5/31.  * MRI L foot 6/1 showed signs suspicious for osteomyelitis 5th MT head and 5th toe prox phalangeal base; 5th MT joint periarticular fluid signal intensity, possibly representing developing soft tissue abscess; dorsal and deeper soft tissue edema within the forefoot.  * Seen by Podiatry and Vascular.   * Underwent vascular  w/u as below and underwent L MEG stenting and L SFA angioplasty on 6/2 and subseuqently felt that there was adequate blood supply for healing.  * S/p L partial 5th ray amputation 6/4/2017.  * Micro: BC's 5/31 NGTD. Tissue cultures L foot 6/4 pending. Abcess cultures L foot 6/4 pending.  - Continue Zosyn (started 5/31).  - Monitor cultures.  - Post-op mgmt per Podiatry - plan delayed closure in a few days, apprec help.  - ID following, apprec help.     2. Peripheral arterial disease with nonhealing, infected wound - s/p L MEG stent and L SFA angioplasty on 6/2.  *[GC1.1] H/o carotid disease.  *[GC1.3] Seen by Vascular this stay.  * KRYSTINA 6/1 suggested significant arterial insufficiency.   *[GC1.1] Carotid US 6/2 showed 50-79& in R and occlusion of the L.[GC1.3] LE angiogram 6/2 showed findings including significant stenosis found in left common iliac artery which was stented with a 10mm stent; L SFA stenosis noted and was angioplastied w 4 mm balloon.  - Continue ASA.  - F/u with Vascular 3 months.     3. DM II with peripheral neuropathy - uncontrolled.  [PTA:[GC1.1] glipizide 10 mg BID, metformin 1000 mg BID.][GC1.3]  * Hgb A1C 8.3 4/2017.[GC1.1]  * Lantus started 6/3.[GC1.3]   Recent Labs   Lab Test  06/05/17   0154  06/04/17   2232  06/04/17   1808  06/04/17   1622  06/04/17   1158   06/01/17   0656   05/31/17   1800   01/20/17   1030  01/16/17   1033   12/27/16   0650   GLC   --    --    --    --    --    --   164*   --   249*   --   193*  337*   --   203*   BGM  111*  119*  174*  163*  135*   < >   --    < >   --    < >   --    --    < >   --     < > = values in this interval not displayed.[GC1.4]   - Continue glipizide 10 mg BID.  - Continue Lantus 4U qHS[GC1.3].  - Restart metformin 1000 mg BID.[GC1.5]  - Continue ISS.[GC1.3]  - After discharge, f/u with PCP regarding adjusting oral regimen; alternatively, consider d/c home with Lantus.[GC1.5]    4. CAD,[GC1.1] CHF.  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 Echo 1/2017 showed LVEF 35-40%. Nuc stress 3/2017 showed findings consistent with prior MI in the RCA/cirumflex distribution with mild ezekiel-infarct ischemia; EF 37% at rest and 28% post stress.  - Hold Plavix until surgeries done.  - Continue ASA, atorvastatin, metoprolol.  - Hold lisinopril for now.  - Monitor i/o's, daily wts.    5. Hypertension (benign essential).  [PTA: lisinopril 2.5 mg daily, metoprolol XL 50 mg daily.]  - Continue metoprolol XL 50 mg daily.  - Hold lisinopril for now.    6. C[GC1.3]erebrovascular disease[GC1.1].  * H/o CVI.  - Continue ASA.  - Should be on AC, but defer further discussions outpt.[GC1.3]   [GC1.1]  7[GC1.3]. Afib[GC1.1]  - Continue ASA, metoprolol.  - Should be on AC, but defer further discussions outpt.[GC1.3]   [GC1.1]  8[GC1.3]. Depression[GC1.1].  - Continue[GC1.3] amitryptiline and duloxetine.    [GC1.1]  9[GC1.3]. GERD[GC1.1].  - C[GC1.3]ontinue omeprazole[GC1.1].[GC1.3]   [GC1.1]  10[GC1.3]. BPH[GC1.1].  -[GC1.3] Continue[GC1.1] F[GC1.3]keke.      Prophylaxis.  - PCD's.    CODE STATUS:[GC1.1] FULL[GC1.3]    Dispo.  -[GC1.1] Pending above.  - 3-4 more days[GC1.3].    Interval History[GC1.1]   Doing OK. Denies pain. Eating OK. Denies CP or SOB.[GC1.5]      -Data reviewed today: I reviewed all new labs and imaging over the last 24 hours. I personally reviewed[GC1.1] no images or EKG's today[GC1.3].    Physical Exam   Heart Rate: 63, Blood pressure 154/59, pulse 73, temperature 97.6  F (36.4  C), temperature source Oral, resp. rate 16, height 1.651 m (5' 5\"), weight 77.1 kg (169 lb 15.6 oz), SpO2 98 %.  Vitals:    06/03/17 0653 06/04/17 0500 06/05/17 0713   Weight: 75.8 kg (167 lb 1.7 oz) 75.1 kg (165 lb 9.1 oz) 77.1 kg (169 lb 15.6 oz)     Vital Signs with Ranges  Temp:  [97.1  F (36.2  C)-98.6  F (37  C)] 97.6  F (36.4  C)  Heart Rate:  [57-73] 63  Resp:  [14-16] 16  BP: " (120-169)/(46-69) 154/59  SpO2:  [94 %-100 %] 98 %  Patient Vitals for the past 24 hrs:   BP Temp Temp src Heart Rate Resp SpO2 Weight   06/05/17 0735 154/59 97.6  F (36.4  C) Oral 63 16 98 % -   06/05/17 0713 - - - - - - 77.1 kg (169 lb 15.6 oz)   06/05/17 0010 141/63 98.2  F (36.8  C) Oral 71 - 95 % -   06/04/17 1951 137/46 97.1  F (36.2  C) Oral 68 16 96 % -   06/04/17 1630 135/48 98.5  F (36.9  C) Axillary 66 16 94 % -   06/04/17 1536 156/56 - - 73 16 100 % -   06/04/17 1438 167/62 - - 62 16 98 % -   06/04/17 1408 169/66 - - 58 16 97 % -   06/04/17 1338 167/64 - - 60 16 98 % -   06/04/17 1335 162/69 - - 61 16 99 % -   06/04/17 1310 155/63 - - 59 16 99 % -   06/04/17 1305 - - - - - 95 % -   06/04/17 1255 150/61 - - 60 16 95 % -   06/04/17 1239 149/57 - - 57 16 95 % -   06/04/17 1223 158/58 97.6  F (36.4  C) Oral 59 16 97 % -   06/04/17 1159 - 98.6  F (37  C) - 57 15 100 % -   06/04/17 1150 141/60 98.4  F (36.9  C) - 57 15 - -   06/04/17 1140 133/56 98.4  F (36.9  C) - 57 15 98 % -   06/04/17 1130 133/55 98.4  F (36.9  C) - 57 16 98 % -   06/04/17 1120 120/52 98.1  F (36.7  C) - 59 14 99 % -     I/O's Last 24 hours  I/O last 3 completed shifts:  In: 1128 [P.O.:240; I.V.:888]  Out: 300 [Urine:250; Blood:50]    Constitutional:[GC1.1] Alert, oriented, pleasant.[GC1.5]  Respiratory:[GC1.1] Clear, no crackles/wheezing.[GC1.5]  Cardiovascular:[GC1.1] Fairly regular, no m/r/g.[GC1.5]  GI:[GC1.1] Soft, nt, nd, +BS.[GC1.5]  Skin/Integumen:[GC1.1] L foot heavily wrapped; no leg edema.[GC1.5]  Other:        Data     Recent Labs  Lab 06/04/17  0830 06/03/17  0655 06/02/17  0707 06/01/17  0656 05/31/17  1800   WBC  --   --   --  8.0 9.5   HGB  --   --   --  11.5* 12.4*   MCV  --   --   --  95 96   PLT  --   --   --  158 192   NA  --   --   --  141 139   POTASSIUM 4.1  --   --  4.6 4.4   CHLORIDE  --   --   --  105 103   CO2  --   --   --  33* 30   BUN  --   --   --  12 15   CR  --  0.68 0.66 0.64* 0.62*   ANIONGAP  --   --    --  3 6   ALLISON  --   --   --  8.7 8.8   GLC  --   --   --  164* 249*     Recent Labs   Lab Test  06/05/17   0154  06/04/17   2232  06/04/17   1808  06/04/17   1622  06/04/17   1158   06/01/17   0656   05/31/17   1800   01/20/17   1030  01/16/17   1033   12/27/16   0650   GLC   --    --    --    --    --    --   164*   --   249*   --   193*  337*   --   203*   BGM  111*  119*  174*  163*  135*   < >   --    < >   --    < >   --    --    < >   --     < > = values in this interval not displayed.         No results found for this or any previous visit (from the past 24 hour(s)).    Medications   All medications were reviewed.    NaCl 100 mL/hr at 06/05/17 0400       insulin glargine  4 Units Subcutaneous At Bedtime     insulin aspart  1-10 Units Subcutaneous TID AC     insulin aspart  1-7 Units Subcutaneous At Bedtime     aspirin  81 mg Oral Daily     atorvastatin (LIPITOR) tablet 80 mg  80 mg Oral Daily     DULoxetine  30 mg Oral Daily     glipiZIDE  10 mg Oral BID AC     ipratropium  2 spray Both Nostrils Q12H     metoprolol  50 mg Oral Daily     omeprazole  40 mg Oral Daily     psyllium  1 packet Oral Daily     tamsulosin  0.4 mg Oral Daily     sodium chloride (PF)  3 mL Intracatheter Q8H     piperacillin-tazobactam  3.375 g Intravenous Q6H[GC1.1]          Revision History        User Key Date/Time User Provider Type Action    > GC1.5 6/5/2017 11:43 AM Faustino Aguilar MD Physician Sign     GC1.2 6/5/2017 11:28 AM Faustino Aguilar MD Physician      GC1.4 6/5/2017 11:19 AM Faustino Aguilar MD Physician      GC1.3 6/5/2017 11:18 AM Faustino Aguilar MD Physician      GC1.1 6/5/2017 11:04 AM Faustino Aguilar MD Physician             Progress Notes by Roland Rodriguez MD at 6/5/2017  7:53 AM     Author:  Roland Rodriguez MD Service:  Vascular Surgery Author Type:  Physician    Filed:  6/5/2017  7:55 AM Date of Service:  6/5/2017  7:53 AM Creation Time:  6/5/2017  7:53 AM    Status:   Signed :  Roland Rodriguez MD (Physician)         Vascular Surgery Progress Note    S:No complaints.  Amputation went well yesterday  Noted goog bleeding to tissue    O:   Vitals:  BP  Min: 120/52  Max: 178/66  Temp  Av.2  F (36.8  C)  Min: 97.1  F (36.2  C)  Max: 98.9  F (37.2  C)  Pulse  Av  Min: 73  Max: 73  I/O last 3 completed shifts:  In: 1128 [P.O.:240; I.V.:888]  Out: 300 [Urine:250; Blood:50]    Physical Exam: Comfortable   Dressings intact to left foot.      Assessment/Plan: Appears to have adequate blood supply for healing.                                  We will plan follow-up vascular exams in three months.      Wm. Jennifer MD[WO1.1]       Revision History        User Key Date/Time User Provider Type Action    > WO1.1 2017  7:55 AM Roland Rodriguez MD Physician Sign                  Procedure Notes     No notes of this type exist for this encounter.         Progress Notes - Therapies (Notes from 17 through 17)      Progress Notes by Cris Kirk at 2017  9:57 AM     Author:  Cris Kirk Service:  (none) Author Type:  (none)    Filed:  2017 10:29 AM Date of Service:  2017  9:57 AM Creation Time:  2017  9:57 AM    Status:  Attested :  Cris Kirk    Cosigner:  Chandrika Singh RD, MINNIE at 2017 12:19 PM        Attestation signed by Chandrika Singh RD, LD at 2017 12:19 PM        I have reviewed and agree with the following note.  Chandirka Singh RD  Pager [phone] (M-F)            [phone] (W/E & Hol)  ]                                 BRIEF NUTRITION REASSESSMENT      CURRENT DIET AND INTAKE:[AP1.1]  Diet:  Moderate Consistent CHO; Yogurt with meals[AP1.2]   -Pt reports consuming 90%-100% of meals ordered with great appetite   -[AP1.1]Per RN flowsheet, pt consuming 100% of needs[AP1.2]       ANTHROPOMETRICS:[AP1.1]  Vitals:    17 1724 17 0500 17 0652 17 0653   Weight: 73 kg (161 lb) 75.6  kg (166 lb 11.2 oz) 75.8 kg (167 lb 1.7 oz) 75.8 kg (167 lb 1.7 oz)    06/04/17 0500 06/05/17 0713 06/06/17 0628   Weight: 75.1 kg (165 lb 9.1 oz) 77.1 kg (169 lb 15.6 oz) 62.3 kg (137 lb 5.6 oz)[AP1.3]   -Wt difficult to assess due to inconsistencies in recorded weights[AP1.2]      LABS:[AP1.1]  BG range (6/1-6/8): 107-243  Hg A1c 8.3 (4/21)[AP1.2]      NUTRITION INTERVENTION:  Nutrition Diagnosis:  No nutrition diagnosis at this time.[AP1.1]    Implementation[AP1.2]  Nutrition education- continuing to monitor BG control for improved wound healing     FOLLOW UP/MONITORING:   Will re-evaluate in 7 - 10 days, or sooner, if re-consulted.[AP1.1]    Cris Kirk Clinical Dietitian[AP1.2]          Revision History        User Key Date/Time User Provider Type Action    > AP1.3 6/8/2017 10:29 AM Cris Kirk (none) Sign     AP1.1 6/8/2017 10:23 AM Cris Kirk (none)      AP1.2 6/8/2017  9:57 AM Cris Kirk (none)             Progress Notes by Marian Corrigan PT at 6/5/2017  2:32 PM     Author:  Marian Corrigan PT Service:  (none) Author Type:  Physical Therapist    Filed:  6/5/2017  2:32 PM Date of Service:  6/5/2017  2:32 PM Creation Time:  6/5/2017  2:32 PM    Status:  Signed :  Marian Corrigan PT (Physical Therapist)            06/05/17 1340   Quick Adds   Type of Visit Initial PT Evaluation   Living Environment   Lives With friend(s)  (Halina)   Living Arrangements house  (Townhouse)   Number of Stairs to Enter Home 3  (1 railing. )   Number of Stairs Within Home 9  (1 railing. )   Transportation Available family or friend will provide  (Pt's friend Halina provides transportation. )   Living Environment Comment Pt's friend Halina assists with cooking, laundry, and cleaning.    Self-Care   Usual Activity Tolerance good   Current Activity Tolerance moderate   Regular Exercise no   Equipment Currently Used at Home cane, straight   Activity/Exercise/Self-Care Comment Pt owns SEC.     Functional Level Prior   Ambulation 1-->assistive equipment   Transferring 1-->assistive equipment   Fall history within last six months yes   Number of times patient has fallen within last six months 1   Prior Functional Level Comment Pt reports completing functional mobility with use of SEC.    General Information   Onset of Illness/Injury or Date of Surgery - Date 05/31/17   Referring Physician Moe Kirk DPM   Patient/Family Goals Statement Return home.    Pertinent History of Current Problem (include personal factors and/or comorbidities that impact the POC) 89 y/o male presenting with L foot ulcer. Pt is now POD # 3 L MEG stent placement and L SFA angioplasty and POD # 1 partial 5th ray amputation. PMH including HTN, CAD, cardiomyopathy, DM, GERD, hyperlipidemia, atrial fibrillation, and osteopenia.    Precautions/Limitations fall precautions   Weight-Bearing Status - LLE nonweight-bearing   General Observations Pt laying in bed upon arrival of therapist.    General Info Comments Up ad mary grace.    Cognitive Status Examination   Orientation orientation to person, place and time   Level of Consciousness alert   Follows Commands and Answers Questions 75% of the time   Personal Safety and Judgment at risk behaviors demonstrated  (Pt impulsive at times with transfers. )   Pain Assessment   Patient Currently in Pain No   Integumentary/Edema   Integumentary/Edema Comments L foot covered with dressing.    Posture    Posture Comments Noted forward head and shoulder posture upon sitting EOB and standing at FWW.    Range of Motion (ROM)   ROM Comment B LEs WFL, limited R ankle ROM secondary to bandage.    Strength   Strength Comments Not formally assessed. Pt demonstrates at least 3/5 grossly in B LEs with movement in supine and functional mobility.    Bed Mobility   Bed Mobility Comments Supine-sit, SBA with HOB elevated.    Transfer Skills   Transfer Comments Sit <> stand with FWW and CGA.    Gait   Gait  "Comments Pt amb a couple steps towards bedside recliner with use of FWW and Oni. Pt demonstrated ability to maintain L NWB restriction with cues.    Balance   Balance Comments Noted good sitting and standing balance at FWW.    Sensory Examination   Sensory Perception Comments Pt denied numbness/tingling in B LEs.    General Therapy Interventions   Planned Therapy Interventions bed mobility training;gait training;ROM;strengthening;transfer training   Clinical Impression   Criteria for Skilled Therapeutic Intervention yes, treatment indicated   PT Diagnosis Difficulty with gait.    Influenced by the following impairments Pain, LE weakness, L NWB restriction, Decreased activity tolerance   Functional limitations due to impairments Limited functional mobility requiring AD and assist.    Clinical Presentation Stable/Uncomplicated   Clinical Presentation Rationale Based on PMH, current presentation, and social support.    Clinical Decision Making (Complexity) Low complexity   Therapy Frequency` daily   Predicted Duration of Therapy Intervention (days/wks) 4 days   Anticipated Discharge Disposition Transitional Care Facility   Risk & Benefits of therapy have been explained Yes   Patient, Family & other staff in agreement with plan of care Yes   [facility] AM-PAC  \"6 Clicks\" V.2 Basic Mobility Inpatient Short Form   1. Turning from your back to your side while in a flat bed without using bedrails? 4 - None   2. Moving from lying on your back to sitting on the side of a flat bed without using bedrails? 3 - A Little   3. Moving to and from a bed to a chair (including a wheelchair)? 3 - A Little   4. Standing up from a chair using your arms (e.g., wheelchair, or bedside chair)? 3 - A Little   5. To walk in hospital room? 2 - A Lot   6. Climbing 3-5 steps with a railing? 1 - Total   Basic Mobility Raw Score (Score out of 24.Lower scores equate to lower levels of function) 16   Total Evaluation Time   Total Evaluation " Time (Minutes) 10[JH1.1]        Revision History        User Key Date/Time User Provider Type Action    > JH1.1 6/5/2017  2:32 PM Marian Corrigan, PT Physical Therapist Sign

## 2017-05-31 NOTE — IP AVS SNAPSHOT
` [place] [address] [facility]: [phone]            Medication Administration Report for Dustin Pearce as of 06/09/17 1450   Legend:    Given Hold Not Given Due Canceled Entry Other Actions    Time Time (Time) Time  Time-Action       Inactive    Active    Linked        Medications 06/03/17 06/04/17 06/05/17 06/06/17 06/07/17 06/08/17 06/09/17    acetaminophen (TYLENOL) tablet 650 mg  Dose: 650 mg Freq: EVERY 4 HOURS PRN Route: PO  PRN Reason: mild pain  Start: 05/31/17 2056   Admin Instructions: Alternate ibuprofen (if ordered) with acetaminophen.  Maximum acetaminophen dose from all sources = 75 mg/kg/day not to exceed 4 grams/day.      0859-Auto Hold       1221-Unhold       1622 (650 mg)-Given        0018 (650 mg)-Given        0658 (650 mg)-Given       1728-Auto Hold       1950-Unhold         1447 (650 mg)-Given            amitriptyline (ELAVIL) tablet 25 mg  Dose: 25 mg Freq: AT BEDTIME PRN Route: PO  PRN Reason: sleep  Start: 05/31/17 2056     0859-Auto Hold       1221-Unhold         1728-Auto Hold       1950-Unhold              aspirin EC EC tablet 81 mg  Dose: 81 mg Freq: DAILY Route: PO  Start: 06/01/17 0900   Admin Instructions: DO NOT CRUSH.     1122 (81 mg)-Given        0859-Auto Hold       0900-Automatically Held       1221-Unhold       1442 (81 mg)-Given        0921 (81 mg)-Given        1728-Auto Hold       1950-Unhold       2125 (81 mg)-Given        0909 (81 mg)-Given        0810 (81 mg)-Given        0907 (81 mg)-Given           atorvastatin (LIPITOR) tablet 80 mg  Dose: 80 mg Freq: DAILY Route: PO  Start: 06/01/17 0900    1122 (80 mg)-Given        0859-Auto Hold       0900-Automatically Held       1221-Unhold       1341 (80 mg)-Given        0920 (80 mg)-Given        0843 (80 mg)-Given       1728-Auto Hold       1950-Unhold        0909 (80 mg)-Given        0810 (80 mg)-Given        0907 (80 mg)-Given           ceFAZolin sodium-dextrose (ANCEF) infusion 2 g  Dose: 2 g Freq:  EVERY 8 HOURS Route: IV  Indications of Use: SKIN AND SOFT TISSUE INFECTION  Start: 06/07/17 1400        1534 (2 g)-Given        0026 (2 g)-Given       0809 (2 g)-Given       1642 (2 g)-Given        0010 (2 g)-Given       0907 (2 g)-Given       [ ] 1600           clopidogrel (PLAVIX) tablet 75 mg  Dose: 75 mg Freq: DAILY Route: PO  Start: 06/09/17 1100          1041 (75 mg)-Given           DULoxetine (CYMBALTA) EC capsule 30 mg  Dose: 30 mg Freq: DAILY Route: PO  Start: 06/01/17 0900    1122 (30 mg)-Given        0859-Auto Hold       0900-Automatically Held       1221-Unhold       1342 (30 mg)-Given        0920 (30 mg)-Given        0843 (30 mg)-Given       1728-Auto Hold       1950-Unhold        0909 (30 mg)-Given        0810 (30 mg)-Given        0907 (30 mg)-Given           glipiZIDE (GLUCOTROL) tablet 10 mg  Dose: 10 mg Freq: 2 TIMES DAILY BEFORE MEALS Route: PO  Start: 06/01/17 0730   Admin Instructions: Take before or with meals.     0633 (10 mg)-Given       1634 (10 mg)-Given        0859-Auto Hold       1221-Unhold       (1246)-Not Given       1623 (10 mg)-Given        0655 (10 mg)-Given       1601 (10 mg)-Given        0657 (10 mg)-Given       1604 (10 mg)-Given       1728-Auto Hold       1950-Unhold        0909 (10 mg)-Given       1640 (10 mg)-Given        0810 (10 mg)-Given       1642 (10 mg)-Given        0908 (10 mg)-Given       [ ] 1630           glucose 40 % gel 15-30 g  Dose: 15-30 g Freq: EVERY 15 MIN PRN Route: PO  PRN Reason: low blood sugar  Start: 05/31/17 2056   Admin Instructions: Give 15 g for BG 51 to 69 mg/dL IF patient is conscious and able to swallow. Give 30 g for BG less than or equal to 50 mg/dL IF patient is conscious and able to swallow. Do NOT give glucose gel via enteral tube.  IF patient has enteral tube: give apple juice 120 mL (4 oz or 15 g of CHO) via enteral tube for BG 51 to 69 mg/dL.  Give apple juice 240 mL (8 oz or 30 g of CHO) via enteral tube for BG less than or equal to 50  mg/dL.      0859-Auto Hold       1221-Unhold         1728-Auto Hold       1950-Unhold             Or  dextrose 50 % injection 25-50 mL  Dose: 25-50 mL Freq: EVERY 15 MIN PRN Route: IV  PRN Reason: low blood sugar  Start: 05/31/17 2056   Admin Instructions: Use if have IV access, BG less than 70 mg/dL and meet dose criteria below:  Dose if conscious and alert (or disorientated) and NPO = 25 mL  Dose if unconscious / not alert = 50 mL  Vesicant.      0859-Auto Hold       1221-Unhold         1728-Auto Hold       1950-Unhold             Or  glucagon injection 1 mg  Dose: 1 mg Freq: EVERY 15 MIN PRN Route: SC  PRN Reason: low blood sugar  PRN Comment: May repeat x 1 only  Start: 05/31/17 2056   Admin Instructions: May give SQ or IM. IF BG less than or equal to 50 mg/dL and no IV access.  ONLY use glucagon IF patient has NO IV access AND is UNABLE to swallow.      0859-Auto Hold       1221-Unhold         1728-Auto Hold       1950-Unhold              Hold: Metformin and metformin containing medications on day of the procedure and for 48 hours after IV contrast given  Freq: HOLD Route: XX  Start: 06/02/17 1554   Admin Instructions: Metformin (GLUCOPHAGE, GLUMETZA, FORTAMET, RIOMET) and metformin containing medications:alogliptin/metformin (KAZANO), glipizide/metformin (METAGLIP), glyburide/metformin (GLUCOVANCE), rosiglitazone/metformin (AVANDAMET), dapagliflozin/metformin (XIGDUO XR), sitagliptin/metformin (JANUMET, JANUMET XR), linagliptin/metformin (JENTADUETO), repaglinidine/metformin (PRANDIMET), saxagliptin/metformin (KOMBIGLYZE XR), canagliflozin/metformin (INVOKAMET), pioglitazone/metformin (ACTOPLUS MET, ACTOPLUS MET XR).  If patient was on one of these medications pre-procedure, continue to HOLD for 48 hours post-procedure if patient received IV contrast.               HYDROcodone-acetaminophen (NORCO) 5-325 MG per tablet 1-2 tablet  Dose: 1-2 tablet Freq: EVERY 4 HOURS PRN Route: PO  PRN Reason: moderate to  severe pain  Start: 05/31/17 2056   Admin Instructions: Maximum acetaminophen dose from all sources= 75 mg/kg/day not to exceed 4 grams     1230 (2 tablet)-Given        0859-Auto Hold       1221-Unhold         1728-Auto Hold       1950-Unhold              insulin aspart (NovoLOG) inj (RAPID ACTING)  Dose: 1-7 Units Freq: AT BEDTIME Route: SC  Start: 06/01/17 2200   Admin Instructions: HIGH INSULIN RESISTANCE DOSING    Do Not give Bedtime Correction Insulin if BG less than 200.   For  - 224 give 1 units.   For  - 249 give 2 units.   For  - 274 give 3 units.   For  - 299 give 4 units.   For  - 324 give 5 units.   For  - 349 give 6 units.   For BG greater than or equal to 350 give 7 units.   Notify provider if glucose greater than or equal to 350 mg/dL after administration of correction dose.  If given at mealtime, must be administered 5 min before meal or immediately after.     2242 (2 Units)-Given        0859-Auto Hold       1221-Unhold       (2233)-Not Given        (2153)-Not Given        1728-Auto Hold       1950-Unhold       (2130)-Not Given [C]        (2202)-Not Given        (2207)-Not Given        [ ] 2200           insulin aspart (NovoLOG) inj (RAPID ACTING)  Dose: 1-10 Units Freq: 3 TIMES DAILY BEFORE MEALS Route: SC  Start: 06/01/17 1700   Admin Instructions: Correction Scale - HIGH INSULIN RESISTANCE DOSING     Do Not give Correction Insulin if Pre-Meal BG less than 140.   For Pre-Meal  - 164 give 1 unit.   For Pre-Meal  - 189 give 2 units.   For Pre-Meal  - 214 give 3 units.   For Pre-Meal  - 239 give 4 units.   For Pre-Meal  - 264 give 5 units.   For Pre-Meal  - 289 give 6 units.   For Pre-Meal  - 314 give 7 units.   For Pre-Meal  - 339 give 8 units.   For Pre-Meal  - 364 give 9 units.   For Pre-Meal BG greater than or equal to 365 give 10 units  To be given with prandial insulin, and based on pre-meal blood glucose.  "  Notify provider if glucose greater than or equal to 350 mg/dL after administration of correction dose.  If given at mealtime, must be administered 5 min before meal or immediately after.     0938 (2 Units)-Given       1136 (4 Units)-Given [C]       (1748)-Not Given        0859-Auto Hold       (0900)-Not Given [C]       1200-Automatically Held       1221-Unhold       (1321)-Not Given [C]       1824 (2 Units)-Given        (0922)-Not Given       1225 (3 Units)-Given       (1805)-Not Given [C]        (0838)-Not Given [C]       (1240)-Not Given       1728-Auto Hold       1950-Unhold               (0730)-Not Given       1222 (1 Units)-Given       1737 (1 Units)-Given        0809 (1 Units)-Given [C]       (1243)-Not Given [C]       1732 (2 Units)-Given        (0826)-Not Given       (1234)-Not Given       [ ] 1700           ipratropium (ATROVENT) 0.03 % spray 2 spray  Dose: 2 spray Freq: EVERY 12 HOURS Route: BOTH NOSTRIL  Start: 05/31/17 2100    1123 (2 spray)-Given       2125 (2 spray)-Given        0859-Auto Hold       0900-Automatically Held       1221-Unhold       (1333)-Not Given [C]       2232 (2 spray)-Given        0924 (2 spray)-Given       2108 (2 spray)-Given        0847 (2 spray)-Given       1728-Auto Hold       1950-Unhold       (2100)-Not Given        1223 (2 spray)-Given       2201 (2 spray)-Given        0809 (2 spray)-Given       2207 (2 spray)-Given        0912 (2 spray)-Given       [ ] 2100           lidocaine 1 % 0.5-5 mL  Dose: 0.5-5 mL Freq: EVERY 1 HOUR PRN Route: OTHER  PRN Comment: mild pain with VAD insertion or accessing implanted port.  Start: 06/06/17 1004   Admin Instructions: Do NOT give if patient has a history of allergy to any local anesthetic or any \"jonh\" product. MAX dose 1 mL subcutaneous OR intradermal in divided doses.        1106 (2 mL)-Given       1728-Auto Hold       1950-Unhold              metFORMIN (GLUCOPHAGE) tablet 1,000 mg  Dose: 1,000 mg Freq: 2 TIMES DAILY WITH MEALS " Route: PO  Start: 06/05/17 1200   Admin Instructions: If the patient receives intravenous, iodinated contrast and patient GFR is greater than 60 ml/min, continue metformin.  Contact provider for 'hold' or 'no hold' instructions if no GFR or if GFR is less than 60 ml/min.       1304 (1,000 mg)-Given       1804 (1,000 mg)-Given        0843 (1,000 mg)-Given       1728-Auto Hold       1800-Automatically Held       1950-Unhold        0908 (1,000 mg)-Given       1738 (1,000 mg)-Given        0810 (1,000 mg)-Given       1739 (1,000 mg)-Given        0907 (1,000 mg)-Given       [ ] 1800           metoprolol (TOPROL-XL) 24 hr tablet 50 mg  Dose: 50 mg Freq: DAILY Route: PO  Start: 06/01/17 0900   Admin Instructions: DO NOT CRUSH. Tablet may be split in half along score line.     1122 (50 mg)-Given        0821 (50 mg)-Given       0859-Auto Hold       1221-Unhold        0921 (50 mg)-Given        0843 (50 mg)-Given       1728-Auto Hold       1950-Unhold        0909 (50 mg)-Given        0810 (50 mg)-Given        0907 (50 mg)-Given           naloxone (NARCAN) injection 0.1-0.4 mg  Dose: 0.1-0.4 mg Freq: EVERY 2 MIN PRN Route: IV  PRN Reason: opioid reversal  Start: 05/31/17 2056   Admin Instructions: For respiratory rate LESS than or EQUAL to 8.  Partial reversal dose:  0.1 mg titrated q 2 minutes for Analgesia Side Effects Monitoring Sedation Level of 3 (frequently drowsy, arousable, drifts to sleep during conversation).Full reversal dose:  0.4 mg bolus for Analgesia Side Effects Monitoring Sedation Level of 4 (somnolent, minimal or no response to stimulation).      0859-Auto Hold       1221-Unhold         1728-Auto Hold       1950-Unhold              naproxen (NAPROSYN) tablet 250 mg  Dose: 250 mg Freq: 3 TIMES DAILY Route: PO  Start: 06/09/17 1600   End: 06/13/17 0859          [ ] 1600       [ ] 2200           omeprazole (priLOSEC) CR capsule 40 mg  Dose: 40 mg Freq: DAILY Route: PO  Start: 06/01/17 0900    1122 (40  mg)-Given        0859-Auto Hold       0900-Automatically Held       1221-Unhold       1342 (40 mg)-Given        0920 (40 mg)-Given        0843 (40 mg)-Given       1728-Auto Hold       1950-Unhold        0908 (40 mg)-Given        0810 (40 mg)-Given        0907 (40 mg)-Given           ondansetron (ZOFRAN-ODT) ODT tab 4 mg  Dose: 4 mg Freq: EVERY 6 HOURS PRN Route: PO  PRN Reason: nausea  Start: 05/31/17 2056   Admin Instructions: This is Step 1 of nausea and vomiting management.  If nausea not resolved in 15 minutes, go to Step 2 prochlorperazine (COMPAZINE). Do not push through foil backing. Peel back foil and gently remove. Place on tongue immediately. Administration with liquid unnecessary      0859-Auto Hold       1221-Unhold         1728-Auto Hold              1950-Unhold             Or  ondansetron (ZOFRAN) injection 4 mg  Dose: 4 mg Freq: EVERY 6 HOURS PRN Route: IV  PRN Reasons: nausea,vomiting  Start: 05/31/17 2056   Admin Instructions: This is Step 1 of nausea and vomiting management.  If nausea not resolved in 15 minutes, go to Step 2 prochlorperazine (COMPAZINE).  Irritant.      0859-Auto Hold       1221-Unhold         1728-Auto Hold       1831 (4 mg)-Given       1950-Unhold              psyllium (METAMUCIL/KONSYL) Packet 1 packet  Dose: 1 packet Freq: DAILY Route: PO  Start: 06/01/17 0900    1121 (1 packet)-Given        0859-Auto Hold       0900-Automatically Held       1221-Unhold       (1332)-Not Given [C]        0921 (1 packet)-Given        0845 (1 packet)-Given       1728-Auto Hold       1950-Unhold        0908 (1 packet)-Given        0809 (1 packet)-Given        0907 (1 packet)-Given           sodium chloride (PF) 0.9% PF flush 10 mL  Dose: 10 mL Freq: EVERY 8 HOURS Route: IK  Start: 06/06/17 1400   Admin Instructions: And Q1H PRN, to lock CVC - Open Ended (Tunneled and Non-Tunneled) dormant lumen.        1346 (10 mL)-Given       1728-Auto Hold       1950-Unhold       (2122)-Not Given        0555 (10  mL)-Given       1533 (10 mL)-Given       1636 (10 mL)-Given        0045 (10 mL)-Given       0817 (10 mL)-Given       1643 (10 mL)-Given        0012 (10 mL)-Given       0909 (10 mL)-Given       [ ] 1630           sodium chloride (PF) 0.9% PF flush 10-20 mL  Dose: 10-20 mL Freq: EVERY 1 HOUR PRN Route: IK  PRN Reasons: line flush,post meds or blood draw  PRN Comment: to flush each peripheral midline IV catheter lumen  Start: 06/06/17 1004   Admin Instructions: 10 mL post IV meds;  20 mL post blood draw        1107 (20 mL)-Given       1728-Auto Hold       1950-Unhold          0106 (10 mL)-Given           tamsulosin (FLOMAX) capsule 0.4 mg  Dose: 0.4 mg Freq: DAILY Route: PO  Start: 06/01/17 0900   Admin Instructions: Administer 30 minutes after the same meal each day.  Capsules should be swallowed whole; do not crush chew or open.     1122 (0.4 mg)-Given        0859-Auto Hold       0900-Automatically Held       1221-Unhold       1342 (0.4 mg)-Given        0921 (0.4 mg)-Given        0843 (0.4 mg)-Given       1728-Auto Hold       1950-Unhold        0908 (0.4 mg)-Given        0810 (0.4 mg)-Given        0907 (0.4 mg)-Given          Completed Medications  Medications 06/03/17 06/04/17 06/05/17 06/06/17 06/07/17 06/08/17 06/09/17         Dose: 1.2 mg Freq: DAILY Route: PO  Start: 06/07/17 2200   End: 06/07/17 2218        2218 (1.2 mg)-Given            Discontinued Medications  Medications 06/03/17 06/04/17 06/05/17 06/06/17 06/07/17 06/08/17 06/09/17         Dose: 2.5 mg Freq: EVERY 4 HOURS PRN Route: NEBULIZATION  PRN Reason: shortness of breath / dyspnea  Start: 06/06/17 1919   End: 06/06/17 1950 1950-Med Discontinued            Freq: PRN  Start: 06/06/17 1918   End: 06/06/17 1950 1918 (16 mL)-Given [C]       1950-Med Discontinued            Freq: PRN  Start: 06/06/17 1918   End: 06/06/17 1950 1918 (600 mL)-Given       1950-Med Discontinued            Dose: 0.6 mg Freq: DAILY Route: PO  Start: 06/08/17  0900   End: 06/09/17 0959         0810 (0.6 mg)-Given        0907 (0.6 mg)-Given       0959-Med Discontinued         Dose: 0.6 mg Freq: DAILY Route: PO  Start: 06/07/17 2145   End: 06/07/17 2148 2148-Med Discontinued  (2152)-Not Given               Dose: 25-50 mcg Freq: EVERY 2 MIN PRN Route: IV  PRN Reason: other  PRN Comment: acute pain  Start: 06/06/17 1919   End: 06/06/17 1950   Admin Instructions: MAX cumulative dose = 250 mcg.    Use Fentanyl initially, as a short acting agent for acute pain control.  If insufficient, or a longer acting agent is needed, begin Morphine or Hydromorphone if ordered.        1950-Med Discontinued            Dose: 0.3-0.5 mg Freq: EVERY 5 MIN PRN Route: IV  PRN Reason: other  PRN Comment: acute pain.  May administer if Respiratory Rate is greater than 10  Start: 06/06/17 1919   End: 06/06/17 1950   Admin Instructions: If fentanyl is also ordered, use HYDROmorphone if pain control insufficient with fentanyl or a longer acting agent is needed.   Max cumulative dose = 2 mg        1950-Med Discontinued            Dose: 4 Units Freq: AT BEDTIME Route: SC  Start: 06/03/17 2200   End: 06/09/17 1000    2126 (4 Units)-Given        0859-Auto Hold       1221-Unhold       2233 (4 Units)-Given        2152 (4 Units)-Given        1728-Auto Hold       1950-Unhold       2140 (4 Units)-Given        2201 (4 Units)-Given        2207 (4 Units)-Given        1000-Med Discontinued         Rate: 100 mL/hr Freq: CONTINUOUS Route: IV  Start: 06/06/17 1930   End: 06/06/17 1950   Admin Instructions: Continue until IV catheter is weaned               1950-Med Discontinued            Rate: 25 mL/hr Freq: CONTINUOUS Route: IV  Start: 06/06/17 1800   End: 06/06/17 1903   Admin Instructions: IF patient NOT on dialysis.        1806 ( )-New Bag       1845 ( )-Anesthesia Volume Adjustment       1903-Med Discontinued            Freq: EVERY 1 HOUR PRN Route: Top  PRN Reason: pain  PRN Comment: with VAD insertion  "or accessing implanted port.  Start: 06/06/17 1954   End: 06/07/17 1530   Admin Instructions: Do NOT give if patient has a history of allergy to any local anesthetic or any \"jonh\" product.   Apply 30 minutes prior to VAD insertion or port access.  MAX Dose:  2.5 g (  of 5 g tube)         1530-Med Discontinued           Dose: 1 mL Freq: EVERY 1 HOUR PRN Route: OTHER  PRN Comment: mild pain with VAD insertion or accessing implanted port  Start: 06/06/17 1954   End: 06/07/17 1530   Admin Instructions: Do NOT give if patient has a history of allergy to any local anesthetic or any \"jonh\" product. MAX dose 1 mL subcutaneous OR intradermal in divided doses.         1530-Med Discontinued           Dose: 1 mL Freq: EVERY 1 HOUR PRN Route: OTHER  PRN Comment: mild pain with VAD insertion or accessing implanted port  Start: 06/06/17 1755   End: 06/06/17 1903   Admin Instructions: Do NOT give if patient has a history of allergy to any local anesthetic or any \"jonh\" product. MAX dose 1 mL subcutaneous OR intradermal in divided doses.        1903-Med Discontinued            Dose: 12.5 mg Freq: EVERY 5 MIN PRN Route: IV  PRN Comment: post anesthesia shivering if Respiratory Rate greater than 10  Start: 06/06/17 1919   End: 06/06/17 1950       1950-Med Discontinued            Freq: CONTINUOUS Route: AS INSTRUCTE  Start: 06/06/17 1800   End: 06/06/17 1903       1824 (1 each)-Given       1903-Med Discontinued            Dose: 4 mg Freq: EVERY 30 MIN PRN Route: PO  PRN Reason: nausea  Start: 06/06/17 1919   End: 06/06/17 1950   Admin Instructions: MAX total dose = 8 mg, including OR dosing. If not resolved in 15 minutes, then go to step 2 (Prochlorperazine if ordered).        1950-Med Discontinued         Or    Dose: 4 mg Freq: EVERY 30 MIN PRN Route: IV  PRN Reason: nausea  Start: 06/06/17 1919   End: 06/06/17 1950   Admin Instructions: MAX total dose = 8 mg, including OR dosing. If not resolved in 15 minutes, then go to step 2 " (Prochlorperazine if ordered).  Irritant.        1950-Med Discontinued            Dose: 3.375 g Freq: EVERY 6 HOURS Route: IV  Indications of Use: SKIN AND SOFT TISSUE INFECTION  Last Dose: 3.375 g (06/05/17 1133)  Start: 06/01/17 0200   End: 06/07/17 1113    0406 (3.375 g)-New Bag       1029 (3.375 g)-New Bag       1634 (3.375 g)-New Bag       2241 (3.375 g)-New Bag        0336 (3.375 g)-New Bag       0859-Auto Hold       0913 (3.375 g)-Handoff       0948 (3.375 g)-Given       1000-Automatically Held       1221-Unhold       1622 (3.375 g)-New Bag       2233 (3.375 g)-New Bag        0410 (3.375 g)-New Bag       1133 (3.375 g)-New Bag       1601 (3.375 g)-New Bag       2152 (3.375 g)-New Bag        0410 (3.375 g)-New Bag       1111 (3.375 g)-New Bag       1603 (3.375 g)-New Bag       1728-Auto Hold       1950-Unhold       2126 (3.375 g)-New Bag        0317 (3.375 g)-New Bag       0908 (3.375 g)-New Bag       1113-Med Discontinued           Dose: 5 mg Freq: EVERY 6 HOURS PRN Route: IV  PRN Reasons: nausea,vomiting  Start: 06/06/17 1919   End: 06/06/17 1950   Admin Instructions: This is Step 2 of the nausea and vomiting protocol.   If nausea not resolved in 15 minutes, give Metoclopramide if ordered (step 3 of nausea and vomiting protocol)          1950-Med Discontinued            Dose: 10 mL Freq: EVERY 8 HOURS Route: IK  Start: 06/06/17 1400   End: 06/07/17 1638   Admin Instructions: And Q1H PRN, to lock peripheral midline IV catheter dormant lumen. Recommended to flush Q8H each lumen even during infusions        (1346)-Not Given [C]       1728-Auto Hold       1950-Unhold       (2115)-Not Given        0555 (10 mL)-Given       1637 (10 mL)-Given       1638-Med Discontinued           Dose: 3 mL Freq: EVERY 8 HOURS Route: IK  Start: 06/06/17 2200   End: 06/07/17 1530   Admin Instructions: And Q1H PRN, to lock peripheral IV dormant line.                0555 (3 mL)-Given       0908 (3 mL)-Given       1530-Med  Discontinued                 Dose: 3 mL Freq: EVERY 1 HOUR PRN Route: IK  PRN Reason: line flush  PRN Comment: for peripheral IV flush post IV meds  Start: 06/06/17 1954   End: 06/07/17 1530        1530-Med Discontinued           Dose: 3 mL Freq: EVERY 1 HOUR PRN Route: IK  PRN Reason: line flush  PRN Comment: for peripheral IV flush post IV meds  Start: 06/06/17 1755   End: 06/06/17 1903       1903-Med Discontinued       Medications 06/03/17 06/04/17 06/05/17 06/06/17 06/07/17 06/08/17 06/09/17

## 2017-05-31 NOTE — ED NOTES
".  [facility]  ED Nurse Handoff Report    ED Chief complaint: Wound Check (Pt has ulcer to L foot, seen in clinic, concern for poor wound healing and cellulitis.)      ED Diagnosis:   Final diagnoses:   None       Code Status: Full Code    Allergies:   Allergies   Allergen Reactions     Oxycodone Other (See Comments)     \"TERRIBLE SWEATING\"     Sulfa Drugs      Tetracycline        Activity level - Baseline/Home:  Stand with Assist - has a cane    Activity Level - Current:   Stand with Assist     Needed?: No    Isolation: No  Infection: Not Applicable    Bariatric?: No    Vital Signs:   Vitals:    05/31/17 1724   BP: 159/51   Pulse: 59   Resp: 16   Temp: 98.9  F (37.2  C)   TempSrc: Temporal   SpO2: 98%   Weight: 73 kg (161 lb)   Height: 1.651 m (5' 5\")       Cardiac Rhythm: ,        Pain level: 0-10 Pain Scale: 4    Is this patient confused?: No    Patient Report: Initial Complaint: Foot pain- wound check  Focused Assessment: Left foot, distal and under side - wound since December with decreased healing and has now become red and more painful over night. Most painful in pinky joint  Tests Performed: Blood and xray  Abnormal Results: xray  Treatments provided: Abx    Family Comments: Wife present    OBS brochure/video discussed/provided to patient: No (not at this time 1856)    ED Medications: UPDATE  Drips infusing?:  UPDATE      ED NURSE PHONE NUMBER: [phone]         "

## 2017-05-31 NOTE — LETTER
Transition Communication Hand-off for Care Transitions to Next Level of Care Provider    Name: Dustin Pearce  MRN #: [id number]  Primary Care Provider: Matt Morrissey  Primary Care MD Name: Matt Morrissey   Primary Clinic: [facility] [address] [place] [address]  Primary Care Clinic Name: [facility]   Reason for Hospitalization:  Cellulitis [L03.90]  Admit Date/Time: 5/31/2017  5:31 PM  Discharge Date: 6/9/17  Payor Source: Payor: MEDICARE / Plan: MEDICARE / Product Type: Medicare /          Reason for Communication Hand-off Referral: Multiple providers/specialties patient d/c to [facility] requires follow ups with Vascular and podiatry.     Discharge Plan:  Discharge Plan:       Most Recent Value    Concerns To Be Addressed discharge planning concerns           Concern for non-adherence with plan of care:   Y/N n   Discharge Needs Assessment:  Needs       Most Recent Value    Anticipated Changes Related to Illness inability to care for self    Equipment Currently Used at Home cane, straight    Transportation Available van, wheelchair accessible [[facility]]    # of Referrals Placed by [facility] External Care Coordination, Communication hand-offs to next level of Care Providers    Transitional Care [facility] [phone]    PAS Number [id number]    Senior Linkage Line Referral Placed 06/08/17    Referrals Placed Senior Linkage Line            Follow-up specialty is recommended: Yes    Follow-up plan:  Future Appointments  Date Time Provider Department Center   7/24/2017 3:00 PM [facility]       Any outstanding tests or procedures:              Key Recommendations:      Lexis Michael    AVS/Discharge Summary is the source of truth; this is a helpful guide for improved communication of patient story

## 2017-06-01 ENCOUNTER — APPOINTMENT (OUTPATIENT)
Dept: MRI IMAGING | Facility: CLINIC | Age: 82
DRG: 617 | End: 2017-06-01
Attending: INTERNAL MEDICINE
Payer: MEDICARE

## 2017-06-01 ENCOUNTER — APPOINTMENT (OUTPATIENT)
Dept: ULTRASOUND IMAGING | Facility: CLINIC | Age: 82
DRG: 617 | End: 2017-06-01
Attending: PODIATRIST
Payer: MEDICARE

## 2017-06-01 LAB
ANION GAP SERPL CALCULATED.3IONS-SCNC: 3 MMOL/L (ref 3–14)
BUN SERPL-MCNC: 12 MG/DL (ref 7–30)
CALCIUM SERPL-MCNC: 8.7 MG/DL (ref 8.5–10.1)
CHLORIDE SERPL-SCNC: 105 MMOL/L (ref 94–109)
CO2 SERPL-SCNC: 33 MMOL/L (ref 20–32)
CREAT SERPL-MCNC: 0.64 MG/DL (ref 0.66–1.25)
ERYTHROCYTE [DISTWIDTH] IN BLOOD BY AUTOMATED COUNT: 13.8 % (ref 10–15)
GFR SERPL CREATININE-BSD FRML MDRD: ABNORMAL ML/MIN/1.7M2
GLUCOSE BLDC GLUCOMTR-MCNC: 112 MG/DL (ref 70–99)
GLUCOSE BLDC GLUCOMTR-MCNC: 116 MG/DL (ref 70–99)
GLUCOSE BLDC GLUCOMTR-MCNC: 154 MG/DL (ref 70–99)
GLUCOSE BLDC GLUCOMTR-MCNC: 179 MG/DL (ref 70–99)
GLUCOSE SERPL-MCNC: 164 MG/DL (ref 70–99)
HCT VFR BLD AUTO: 35.9 % (ref 40–53)
HGB BLD-MCNC: 11.5 G/DL (ref 13.3–17.7)
MCH RBC QN AUTO: 30.4 PG (ref 26.5–33)
MCHC RBC AUTO-ENTMCNC: 32 G/DL (ref 31.5–36.5)
MCV RBC AUTO: 95 FL (ref 78–100)
PLATELET # BLD AUTO: 158 10E9/L (ref 150–450)
POTASSIUM SERPL-SCNC: 4.6 MMOL/L (ref 3.4–5.3)
RBC # BLD AUTO: 3.78 10E12/L (ref 4.4–5.9)
SODIUM SERPL-SCNC: 141 MMOL/L (ref 133–144)
WBC # BLD AUTO: 8 10E9/L (ref 4–11)

## 2017-06-01 PROCEDURE — 80048 BASIC METABOLIC PNL TOTAL CA: CPT | Performed by: INTERNAL MEDICINE

## 2017-06-01 PROCEDURE — 12000000 ZZH R&B MED SURG/OB

## 2017-06-01 PROCEDURE — 36415 COLL VENOUS BLD VENIPUNCTURE: CPT | Performed by: INTERNAL MEDICINE

## 2017-06-01 PROCEDURE — A9270 NON-COVERED ITEM OR SERVICE: HCPCS | Mod: GY | Performed by: INTERNAL MEDICINE

## 2017-06-01 PROCEDURE — 25000132 ZZH RX MED GY IP 250 OP 250 PS 637: Mod: GY | Performed by: INTERNAL MEDICINE

## 2017-06-01 PROCEDURE — 85027 COMPLETE CBC AUTOMATED: CPT | Performed by: INTERNAL MEDICINE

## 2017-06-01 PROCEDURE — 99233 SBSQ HOSP IP/OBS HIGH 50: CPT | Performed by: INTERNAL MEDICINE

## 2017-06-01 PROCEDURE — A9585 GADOBUTROL INJECTION: HCPCS | Performed by: INTERNAL MEDICINE

## 2017-06-01 PROCEDURE — 99223 1ST HOSP IP/OBS HIGH 75: CPT | Performed by: PODIATRIST

## 2017-06-01 PROCEDURE — 00000146 ZZHCL STATISTIC GLUCOSE BY METER IP

## 2017-06-01 PROCEDURE — 25000128 H RX IP 250 OP 636: Performed by: INTERNAL MEDICINE

## 2017-06-01 PROCEDURE — 93922 UPR/L XTREMITY ART 2 LEVELS: CPT

## 2017-06-01 PROCEDURE — 73720 MRI LWR EXTREMITY W/O&W/DYE: CPT | Mod: LT

## 2017-06-01 PROCEDURE — 82565 ASSAY OF CREATININE: CPT | Performed by: INTERNAL MEDICINE

## 2017-06-01 RX ORDER — NICOTINE POLACRILEX 4 MG
15-30 LOZENGE BUCCAL
Status: DISCONTINUED | OUTPATIENT
Start: 2017-06-01 | End: 2017-06-01

## 2017-06-01 RX ORDER — DEXTROSE MONOHYDRATE 25 G/50ML
25-50 INJECTION, SOLUTION INTRAVENOUS
Status: DISCONTINUED | OUTPATIENT
Start: 2017-06-01 | End: 2017-06-01

## 2017-06-01 RX ORDER — GADOBUTROL 604.72 MG/ML
7 INJECTION INTRAVENOUS ONCE
Status: COMPLETED | OUTPATIENT
Start: 2017-06-01 | End: 2017-06-01

## 2017-06-01 RX ADMIN — ATORVASTATIN CALCIUM 80 MG: 40 TABLET, FILM COATED ORAL at 09:52

## 2017-06-01 RX ADMIN — CLOPIDOGREL BISULFATE 75 MG: 75 TABLET, FILM COATED ORAL at 09:51

## 2017-06-01 RX ADMIN — LISINOPRIL 2.5 MG: 2.5 TABLET ORAL at 09:52

## 2017-06-01 RX ADMIN — PSYLLIUM HUSK 1 PACKET: 3.4 POWDER ORAL at 09:51

## 2017-06-01 RX ADMIN — GLIPIZIDE 10 MG: 10 TABLET ORAL at 15:54

## 2017-06-01 RX ADMIN — GLIPIZIDE 10 MG: 10 TABLET ORAL at 09:51

## 2017-06-01 RX ADMIN — PIPERACILLIN SODIUM,TAZOBACTAM SODIUM 3.38 G: 3; .375 INJECTION, POWDER, FOR SOLUTION INTRAVENOUS at 15:18

## 2017-06-01 RX ADMIN — METOPROLOL SUCCINATE 50 MG: 50 TABLET, EXTENDED RELEASE ORAL at 09:52

## 2017-06-01 RX ADMIN — OMEPRAZOLE 40 MG: 20 CAPSULE, DELAYED RELEASE ORAL at 09:51

## 2017-06-01 RX ADMIN — HYDROCODONE BITARTRATE AND ACETAMINOPHEN 2 TABLET: 5; 325 TABLET ORAL at 12:12

## 2017-06-01 RX ADMIN — IPRATROPIUM BROMIDE 2 SPRAY: 21 SPRAY NASAL at 09:52

## 2017-06-01 RX ADMIN — PIPERACILLIN SODIUM,TAZOBACTAM SODIUM 3.38 G: 3; .375 INJECTION, POWDER, FOR SOLUTION INTRAVENOUS at 20:22

## 2017-06-01 RX ADMIN — DULOXETINE HYDROCHLORIDE 30 MG: 30 CAPSULE, DELAYED RELEASE ORAL at 09:52

## 2017-06-01 RX ADMIN — TAMSULOSIN HYDROCHLORIDE 0.4 MG: 0.4 CAPSULE ORAL at 09:51

## 2017-06-01 RX ADMIN — GADOBUTROL 7 ML: 604.72 INJECTION INTRAVENOUS at 08:03

## 2017-06-01 RX ADMIN — HYDROCODONE BITARTRATE AND ACETAMINOPHEN 2 TABLET: 5; 325 TABLET ORAL at 21:45

## 2017-06-01 RX ADMIN — ASPIRIN 81 MG: 81 TABLET, COATED ORAL at 09:51

## 2017-06-01 RX ADMIN — METFORMIN HYDROCHLORIDE 1000 MG: 500 TABLET, FILM COATED ORAL at 09:51

## 2017-06-01 RX ADMIN — PIPERACILLIN SODIUM,TAZOBACTAM SODIUM 3.38 G: 3; .375 INJECTION, POWDER, FOR SOLUTION INTRAVENOUS at 01:59

## 2017-06-01 RX ADMIN — ACETAMINOPHEN 650 MG: 325 TABLET, FILM COATED ORAL at 05:30

## 2017-06-01 RX ADMIN — PIPERACILLIN SODIUM,TAZOBACTAM SODIUM 3.38 G: 3; .375 INJECTION, POWDER, FOR SOLUTION INTRAVENOUS at 09:46

## 2017-06-01 ASSESSMENT — PAIN DESCRIPTION - DESCRIPTORS: DESCRIPTORS: SHARP;STABBING

## 2017-06-01 NOTE — CONSULTS
[facility]/PODIATRY CONSULTATION  June 1, 2017      ASSESSMENT: L chronic foot ulceration, PAD, MRI indicated osteomyelitis and possible early abscess     PLAN:  Reviewed patient's chart in epic.  Discussed condition and treatment options including pros and cons.    -Anticipate pt will need at least partial left 5th ray amputation due to MRI indicated osteomyelitis, possible early abscess.  -Vascular status in question; ABIs ordered; anticipate Vascular Surgery consult pending results.  -Foot surgery to be delayed pending Vascular workup, but this may change if condition deteriorates; pt stable at this time.  -IV abx to continue; ID consulted for management.  -Dressing applied to foot.  -Offload heels at all times while in bed; discussed plan with RN.  -Will follow; thank you for the consult.     Moe Kirk DPM, FACFAS  Pager: [phone]      PATIENT HISTORY:  Dustin Pearce is a 89 year old male who was admitted for a worsening left foot infection.  Pt has had a wound on this foot for about 5 months, sub 5th MPJ area.  Pt relates he has been under the care of Dr. Arrieta at [facility].  He was admitted through the ED.  Pt reports pain in the foot.  Denies f/c/n/v.  PMH complex including DM, CAD, afib on anticoagulation.    Review of Systems:  Rest of complete 10 pt ROS neg except for HPI.     PAST MEDICAL HISTORY:   Past Medical History:   Diagnosis Date     Aortic root dilatation (H)      Benign essential hypertension 1/6/2017     Benign non-nodular prostatic hyperplasia with lower urinary tract symptoms 10/20/2016     CAD (coronary artery disease)      Cardiomyopathy (H)      Carotid artery stenosis 10/20/2016     Carotid occlusion, left      Coronary artery disease involving native coronary artery of native heart without angina pectoris 10/20/2016     Diabetes mellitus (H)      DJD (degenerative joint disease)      Gastro-oesophageal reflux disease       Gastroesophageal reflux disease without esophagitis 10/20/2016     Heart attack (H)      Hyperlipidemia      Hypertension      Mild cognitive impairment 10/20/2016     Nephrolithiasis     RECURRENT     Osteopenia      Paroxysmal atrial fibrillation (H)      PONV (postoperative nausea and vomiting)      Unspecified cerebral artery occlusion with cerebral infarction         PAST SURGICAL HISTORY:   Past Surgical History:   Procedure Laterality Date     CHOLECYSTECTOMY       ESOPHAGOSCOPY, GASTROSCOPY, DUODENOSCOPY (EGD), COMBINED N/A 12/26/2016    Procedure: COMBINED ESOPHAGOSCOPY, GASTROSCOPY, DUODENOSCOPY (EGD);  Surgeon: Nasim Louis MD;  Location:  GI     GENITOURINARY SURGERY      KIDNEY STONE REMOVAL X 4     HC UGI ENDOSCOPY W EUS N/A 12/29/2016    Procedure: COMBINED ENDOSCOPIC ULTRASOUND, ESOPHAGOSCOPY, GASTROSCOPY, DUODENOSCOPY (EGD);  Surgeon: Nasim Louis MD;  Location:  GI     HERNIA REPAIR       LASER HOLMIUM LITHOTRIPSY URETER(S), INSERT STENT, COMBINED  3/2/2012    Procedure:COMBINED CYSTOSCOPY, URETEROSCOPY, LASER HOLMIUM LITHOTRIPSY URETER(S), INSERT STENT; CYSTOSCOPY, RIGHT RETROGRADES, RIGHT URETEROSCOPY, HOLMIUM LASER, RIGHT STENT PLACEMENT; Surgeon:ANJELICA LEÓN; Location: OR     ROTATOR CUFF REPAIR RT/LT          MEDICATIONS:   Current Facility-Administered Medications:      vancomycin 1250 mg in 0.9% NaCl 250 mL PREMIX, 1,250 mg, Intravenous, Once, Nadia Miller MD, Stopped at 05/31/17 2029     amitriptyline (ELAVIL) tablet 25 mg, 25 mg, Oral, At Bedtime PRN, Blaze Torres MD     aspirin EC EC tablet 81 mg, 81 mg, Oral, Daily, Blaze Torres MD, 81 mg at 06/01/17 0951     atorvastatin (LIPITOR) tablet 80 mg, 80 mg, Oral, Daily, Blaze Torres MD, 80 mg at 06/01/17 0952     clopidogrel (PLAVIX) tablet 75 mg, 75 mg, Oral, Daily, Blaze Torres MD, 75 mg at 06/01/17 0951     DULoxetine (CYMBALTA) EC capsule 30 mg, 30 mg, Oral, Daily, Brian  Blaze ESPANA MD, 30 mg at 06/01/17 0952     glipiZIDE (GLUCOTROL) tablet 10 mg, 10 mg, Oral, BID AC, Blaze Torres MD, 10 mg at 06/01/17 0951     HYDROcodone-acetaminophen (NORCO) 5-325 MG per tablet 1-2 tablet, 1-2 tablet, Oral, Q4H PRN, Blaze Torres MD, 2 tablet at 05/31/17 2253     metFORMIN (GLUCOPHAGE) tablet 1,000 mg, 1,000 mg, Oral, BID w/meals, Blaze Torres MD, 1,000 mg at 06/01/17 0951     lisinopril (PRINIVIL/Zestril) tablet 2.5 mg, 2.5 mg, Oral, Daily, Blaze Torres MD, 2.5 mg at 06/01/17 0952     ipratropium (ATROVENT) 0.03 % spray 2 spray, 2 spray, Both Nostrils, Q12H, Blaze Torres MD, 2 spray at 06/01/17 0952     metoprolol (TOPROL-XL) 24 hr tablet 50 mg, 50 mg, Oral, Daily, Blaze Torres MD, 50 mg at 06/01/17 0952     omeprazole (priLOSEC) CR capsule 40 mg, 40 mg, Oral, Daily, Blaze Torres MD, 40 mg at 06/01/17 0951     psyllium (METAMUCIL/KONSYL) Packet 1 packet, 1 packet, Oral, Daily, Blaze Torres MD, 1 packet at 06/01/17 0951     tamsulosin (FLOMAX) capsule 0.4 mg, 0.4 mg, Oral, Daily, Blaze Torres MD, 0.4 mg at 06/01/17 0951     naloxone (NARCAN) injection 0.1-0.4 mg, 0.1-0.4 mg, Intravenous, Q2 Min PRN, Blaze Torres MD     lidocaine 1 % 1 mL, 1 mL, Other, Q1H PRN, Blaze Torres MD     lidocaine (LMX4) cream, , Topical, Q1H PRN, Blzae Torres MD     sodium chloride (PF) 0.9% PF flush 3 mL, 3 mL, Intracatheter, Q1H PRN, Blaze Torres MD     sodium chloride (PF) 0.9% PF flush 3 mL, 3 mL, Intracatheter, Q8H, Blaze Torres MD     acetaminophen (TYLENOL) tablet 650 mg, 650 mg, Oral, Q4H PRN, Blaze Torres MD, 650 mg at 06/01/17 0530     ondansetron (ZOFRAN-ODT) ODT tab 4 mg, 4 mg, Oral, Q6H PRN **OR** ondansetron (ZOFRAN) injection 4 mg, 4 mg, Intravenous, Q6H PRN, Blaze Torres MD     piperacillin-tazobactam (ZOSYN) 3.375 g vial to attach to  mL bag, 3.375 g, Intravenous, Q6H, Blaze Torres MD, 3.375 g at 06/01/17 0946     glucose 40 % gel  "15-30 g, 15-30 g, Oral, Q15 Min PRN **OR** dextrose 50 % injection 25-50 mL, 25-50 mL, Intravenous, Q15 Min PRN **OR** glucagon injection 1 mg, 1 mg, Subcutaneous, Q15 Min PRN, Blaze Torres MD     insulin aspart (NovoLOG) inj (RAPID ACTING), 1-7 Units, Subcutaneous, TID AC, Blaze Torres MD, 1 Units at 06/01/17 0952     insulin aspart (NovoLOG) inj (RAPID ACTING), 1-5 Units, Subcutaneous, At Bedtime, Blaze Torres MD     ALLERGIES:    Allergies   Allergen Reactions     Oxycodone Other (See Comments)     \"TERRIBLE SWEATING\"     Sulfa Drugs      Tetracycline         SOCIAL HISTORY:   Social History     Social History     Marital status:      Spouse name: N/A     Number of children: N/A     Years of education: N/A     Occupational History     Not on file.     Social History Main Topics     Smoking status: Former Smoker     Packs/day: 1.00     Years: 30.00     Types: Cigarettes     Quit date: 1/1/1999     Smokeless tobacco: Never Used     Alcohol use 4.2 oz/week     7 Standard drinks or equivalent per week      Comment: 1 drink per week     Drug use: No     Sexual activity: No     Other Topics Concern     Not on file     Social History Narrative        FAMILY HISTORY:   Family History   Problem Relation Age of Onset     Breast Cancer Mother      Heart Failure Father 81        EXAM:Vitals: /46 (BP Location: Right arm)  Pulse 59  Temp 97.2  F (36.2  C) (Oral)  Resp 16  Ht 1.651 m (5' 5\")  Wt 75.6 kg (166 lb 11.2 oz)  SpO2 98%  BMI 27.74 kg/m2  BMI= Body mass index is 27.74 kg/(m^2).    General appearance: Patient is alert and fully cooperative with history & exam.  No sign of distress is noted during the visit.     Psychiatric: Affect is pleasant & appropriate.  Patient appears motivated to improve health.     Respiratory: Breathing is regular & unlabored while sitting.     HEENT: Hearing is intact to spoken word.  Speech is clear.  No gross evidence of visual impairment that would impact " ambulation.     Dermatologic: Approx 4mm x 2mm ulceration sub left 5th MPJ area with exposed fat layer.  Does not probe to bone.  Local erythema and edema extending to dorsal midfoot area.  4 small dry scabs to plantar left foot likely from prior bandage.  No R foot wounds.     Vascular: DP & PT pulses are absent b/l.  Skin to feet cool.  CRT delayed.  Mild L foot pitting edema.     Neurologic: Lower extremity sensation is diminished to light touch b/l.       Musculoskeletal: Patient is ambulatory without assistive device or brace.  No gross ankle deformity noted.  No foot or ankle joint effusion is noted.    Lab Results   Component Value Date    WBC 8.0 06/01/2017     Lab Results   Component Value Date    RBC 3.78 06/01/2017     Lab Results   Component Value Date    HGB 11.5 06/01/2017     Lab Results   Component Value Date    HCT 35.9 06/01/2017     No components found for: MCT  Lab Results   Component Value Date    MCV 95 06/01/2017     Lab Results   Component Value Date    MCH 30.4 06/01/2017     Lab Results   Component Value Date    MCHC 32.0 06/01/2017     Lab Results   Component Value Date    RDW 13.8 06/01/2017     Lab Results   Component Value Date     06/01/2017       ESR 43  CRP 21.2      Imaging reviewed with pt:    XR FOOT LT G/E 3 VW 5/31/2017 6:40 PM     COMPARISON: None.     HISTORY: Left foot pain.         IMPRESSION: Degenerative changes at the left third proximal  interphalangeal joint. Osteopenia is noted in the left foot, but no  displaced fractures are seen.     CONNOR BURNS        MR FOOT LEFT WITHOUT AND WITH CONTRAST   6/1/2017 8:19 AM      HISTORY: Diabetic foot ulcer and cellulitis, concern for  osteomyelitis.     DOSE: 7 mL Gadavist.     COMPARISON: 5/31/2017 radiographs.     FINDINGS: There is a suspected soft tissue wound along the plantar  aspect of the fifth metatarsophalangeal joint. Fluid/edema is noted  about the fifth metatarsophalangeal joint capsule. Ill-defined  marrow  edema is also noted within the fifth metatarsal head and fifth toe  proximal phalangeal base without apparent osseous destruction.  Ill-defined subcutaneous edema is noted along the dorsum of the foot  as well as within the deeper soft tissues of the forefoot. No  rim-enhancing soft tissue fluid collection or abscess is visible  within the forefoot. Marrow signal within the forefoot is otherwise  within normal limits.         IMPRESSION:  1. Suspected soft tissue wound along the plantar aspect of the fifth  metatarsophalangeal joint.  2. Marrow edema within the fifth metatarsal head and fifth toe  proximal phalangeal base. Given proximity to soft tissue wound, this  is highly suspicious for osteomyelitis.  3. Fifth metatarsophalangeal joint periarticular fluid signal  intensity. While there is no rim enhancement, this could represent  developing soft tissue abscess adjacent to the joint capsule.  4. Dorsal and deeper soft tissue edema within the forefoot. It should  be noted that MR cannot distinguish reactive edema from cellulitis.     VERO DOYLE MD

## 2017-06-01 NOTE — PHARMACY-VANCOMYCIN DOSING SERVICE
Pharmacy Vancomycin Initial Note  Date of Service May 31, 2017  Patient's  1928  89 year old, male    Indication: Skin and Soft Tissue Infection    Current estimated CrCl = Estimated Creatinine Clearance: 83.4 mL/min (based on Cr of 0.62).    Creatinine for last 3 days  2017:  6:00 PM Creatinine 0.62 mg/dL    Recent Vancomycin Level(s) for last 3 days  No results found for requested labs within last 72 hours.      Vancomycin IV Administrations (past 72 hours)      No vancomycin orders with administrations in past 72 hours.                Nephrotoxins and other renal medications (Future)    Start     Dose/Rate Route Frequency Ordered Stop    17 1800  vancomycin 1250 mg in 0.9% NaCl 250 mL PREMIX      1,250 mg Intravenous EVERY 24 HOURS 17 0900  lisinopril (PRINIVIL/Zestril) tablet 2.5 mg      2.5 mg Oral DAILY 17 0200  piperacillin-tazobactam (ZOSYN) 3.375 g vial to attach to  mL bag     Comments:  Pharmacy can adjust dose based on renal function.    3.375 g  over 1 Hours Intravenous EVERY 6 HOURS 17 192  vancomycin 1250 mg in 0.9% NaCl 250 mL PREMIX      1,250 mg Intravenous ONCE 17 1925            Contrast Orders - past 72 hours     None                Plan:  1.  Start vancomycin  1250 mg IV q24h.   2.  Goal Trough Level: 10-15 mg/L   3.  Pharmacy will check trough levels as appropriate in 1-3 Days.    4. Serum creatinine levels will be ordered a minimum of twice weekly.    5. [place] method utilized to dose vancomycin therapy: Method 2    Angelina Frost

## 2017-06-01 NOTE — PLAN OF CARE
Pt A&Ox4. VSS. C/o mild pain in LLE. Tylenol 1x. 4 wounds noted on bottom of L foot. No drainage berry. Regular diet. Up w/Ax1 w/gb + cane to BR 1x. 2 PIV SL. Receiving IV zosyn q6h. MRI scheduled for 6/1. Podiatry and ID consulted. Continue to monitor.

## 2017-06-01 NOTE — CONSULTS
"  MD RECOMMENDATIONS:  1) daily multivitamin  2) change diet from \"regular\" to \"Moderate CHO\"      BRIEF NUTRITION ASSESSMENT      REASON FOR ASSESSMENT:  Nutrition Admission Screen - \"Stageable pressure injuries or large/non-healing wound or burn\"    NUTRITION HISTORY:  Visited with pt this morning  He tells me that he has seen dietitians in the past  His MD has told him that blood sugars need to be good to help with wound healing  Pt chooses not to follows a diet at home  States he has ~2 regular COKE and ~2 Arnold Rubio drinks per week  Does not really eat sweets  Dislikes rice and doesn't eat much bread  Loves pot roast    CURRENT DIET AND INTAKE:  Diet:  Regular              Pt with good appetite  Ate 100% breakfast today - omelet, sausage, fruit, blueberry muffin, OJ    ANTHROPOMETRICS:  Height: 5'5\"  Weight: (6/1) 75.6 kg  BMI: 27.7 kg/m2  IBW:  61.8 kg  Weight Status: Overweight BMI 25-29.9  %IBW: 122%  Weight History:   Wt Readings from Last 10 Encounters:   06/01/17 75.6 kg (166 lb 11.2 oz)   04/21/17 73 kg (161 lb)   03/07/17 72 kg (158 lb 12.8 oz)   02/24/17 71.2 kg (157 lb)   01/27/17 72.4 kg (159 lb 11.2 oz)   01/20/17 69 kg (152 lb 1.9 oz)   01/16/17 70.3 kg (155 lb)   01/06/17 73.4 kg (161 lb 14.4 oz)   12/27/16 73.8 kg (162 lb 11.2 oz)   11/25/16 74.8 kg (165 lb)       6/1: Podiatry consult --->  \"Approx 4mm x 2mm ulceration sub left 5th MPJ area with exposed fat layer.  Does not probe to bone.  Local erythema and edema extending to dorsal midfoot area.  4 small dry scabs to plantar left foot likely from prior bandage.  Anticipate pt will need at least partial left 5th ray amputation due to MRI indicated osteomyelitis, possible early abscess.\"      LABS:  4/21/17: HgbA1C 8.3    BGM: 116, 164, 154    MALNUTRITION:  Patient does not meet two of the following criteria necessary for diagnosing malnutrition: significant weight loss, reduced intake, subcutaneous fat loss, muscle loss or fluid " retention    NUTRITION INTERVENTION:  Nutrition Diagnosis:  No nutrition diagnosis at this time.    Implementation:  Nutrition Education ---> Verbally reviewed importance of good blood sugar control for wound healing.  Pt declined further review/handouts.    FOLLOW UP/MONITORING:   Will re-evaluate in 7 - 10 days, or sooner, if re-consulted.

## 2017-06-01 NOTE — PHARMACY-ADMISSION MEDICATION HISTORY
Admission medication history interview status for the 5/31/2017  admission is complete. See EPIC admission navigator for prior to admission medications     Medication history source reliability:Good    Actions taken by pharmacist (provider contacted, etc):Discussed PTA med list with patient and his wife     Additional medication history information not noted on PTA med list : doxycycline 100 mg po twice daily started yesterday 5/30    Medication reconciliation/reorder completed by provider prior to medication history? No    Time spent in this activity: 15 minutes     Prior to Admission medications    Medication Sig Last Dose Taking? Auth Provider   LISINOPRIL PO Take 2.5 mg by mouth daily 5/31/2017 at Unknown time Yes Unknown, Entered By History   AMITRIPTYLINE HCL PO Take 25 mg by mouth nightly as needed for sleep Past Month at prn Yes Unknown, Entered By History   ipratropium (ATROVENT) 0.03 % spray Spray 2 sprays into both nostrils every 12 hours Past Week at Unknown time Yes Unknown, Entered By History   DOXYCYCLINE HYCLATE PO Take 100 mg by mouth 2 times daily For 10 days 5/31/2017 at Unknown time Yes Unknown, Entered By History   DULoxetine (CYMBALTA) 30 MG EC capsule Take 1 capsule (30 mg) by mouth daily 5/31/2017 at Unknown time Yes aMtt Morrissey MD   glipiZIDE (GLUCOTROL) 10 MG tablet Take 1 tablet (10 mg) by mouth 2 times daily (before meals) 5/31/2017 at x1 Yes Matt Morrissey MD   metoprolol (TOPROL-XL) 50 MG 24 hr tablet Take 1 tablet (50 mg) by mouth daily 5/31/2017 at Unknown time Yes Rafa Samuel MD   omeprazole (PRILOSEC) 40 MG capsule Take 1 capsule (40 mg) by mouth daily Take 30-60 minutes before a meal. 5/31/2017 at Unknown time Yes Matt Morrissey MD   tamsulosin (FLOMAX) 0.4 MG capsule Take 1 capsule (0.4 mg) by mouth daily 5/30/2017 at Unknown time Yes Matt Morrissey MD   aspirin EC 81 MG EC tablet Take 1 tablet (81 mg) by mouth daily 5/31/2017 at Unknown time Yes Tra Reynoso  MD Padma   HYDROcodone-acetaminophen (NORCO) 5-325 MG per tablet Take 1-2 tablets by mouth every 4 hours as needed for moderate to severe pain Reported on 3/7/2017 Past Month at prn Yes Unknown, Entered By History   METFORMIN HCL PO Take 1,000 mg by mouth 2 times daily (with meals) 5/31/2017 at x1 Yes Reported, Patient   ATORVASTATIN CALCIUM PO Take 80 mg by mouth daily 5/31/2017 at Unknown time Yes Reported, Patient   Psyllium (METAMUCIL PO) Take 1 packet by mouth daily  Past Month at prn Yes Reported, Patient   Clopidogrel Bisulfate, [id number], (PLAVIX PO) Take 75 mg by mouth daily  5/31/2017 at Unknown time Yes Reported, Patient   blood glucose monitoring (NO BRAND SPECIFIED) test strip Use to test blood sugar three times daily or as directed.   Matt Morrissey MD   order for DME Equipment being ordered: True Matrix Blood Glucose meter.   Matt Morrissey MD

## 2017-06-01 NOTE — PLAN OF CARE
Problem: Goal Outcome Summary  Goal: Goal Outcome Summary  Outcome: No Change  A&Ox4. VSS. C/o mild pain in LLE. 2 tabs Norco x1 with relief. 4 wounds noted on bottom of L foot. No drainage, wrapped in Kerlix. Keep legs elevated and float heels at all times while in bed.  Up with assist x1 + cane. L PIV SL. Receiving IV zosyn q6h. MRI this AM, please see results. Podiatry and ID consulted. ABIs ordered. Patient currently off floor for ultrasound. Plan for likely vascular surgery consult pending results. Continue to monitor.

## 2017-06-01 NOTE — PROGRESS NOTES
SPIRITUAL HEALTH SERVICES Progress Note  [facility]    Met with pt, per consult request.  Pt was finishing breakfast when I arrived.  He shared the story of his on-going foot infection, and his anticipation for surgery.  Pt was in good spirits, and talked about his family (one daughter and three step-children) and his conversion to [employer]ism a few years ago.  Visit ended when other care providers arrived, but I left word for Fr. Dent to visit as well.   team is available per additional request for support.                                                                                                                                                 Tana Schuster M.A.  Staff   Pager [phone]  Phone [phone]

## 2017-06-01 NOTE — PROGRESS NOTES
[facility]    Hospitalist Progress Note    Date of Service (when I saw the patient): 06/01/2017    Assessment & Plan   Dustin Pearce is a 89 year old male who was admitted on 5/31/2017.     1.  Left foot diabetic ulcer with cellulitis, concerning for osteomyelitis  MRI very suspicious for osteomyelitis  On Zosyn. BC pending. Appreciate ID and Podiatry input.       2. Peripheral arterial disease  KRYSTINA suggests significant arterial insufficiency:     Will consult vascular surgery team for input    3. DM II with peripheral neuropathy: uncontrolled.      Ref. Range 1/21/2017 12:10 1/21/2017 18:10 5/31/2017 18:00 5/31/2017 22:01 6/1/2017 01:59 6/1/2017 06:56 6/1/2017 08:59   Glucose Latest Ref Range: 70 - 99 mg/dL 173 (H) 138 (H) 249 (H) 178 (H) 116 (H) 164 (H) 154 (H)       Will stop metformin, which can cause metabolic acidosis in context of infection.     Change ISS to High intensity ISS. Cotninue glipizide.     4. CAD  On asa, plavix, lipitor, lisinopril, metoprolol.     5. Afib  On metoprolol  Not on anticoagulation    6. Depression: on amitryptiline and duloxetine.     7. GERD: continue omeprazole    8. BPH:  Continue flomax.     DVT Prophylaxis: Pneumatic Compression Devices  Code Status: Full Code    Disposition: Expected discharge in 3+ days    Time 40 minutes.     Min Garcia MD  [phone] (P)  Text Page (7 am to 6 pm)    Interval History      Seen with VAN Ann    Feels a little overwhelmed with all the information.    Discussed the likely osteomyelitis and need for abx, vascular consultation and likely surgery.     Denies new complaints. Has not had diarhea on abx, but agrees with recommendation to add yogurt.     -Data reviewed today: I reviewed all new labs and imaging results over the last 24 hours. I personally reviewed no images or EKG's today.    Physical Exam   Temp: 96.2  F (35.7  C) Temp src: Oral BP: 135/56 Pulse: 59 Heart Rate: 57 Resp: 18 SpO2: 98 % O2 Device: None (Room  air)    Vitals:    05/31/17 1724 06/01/17 0500   Weight: 73 kg (161 lb) 75.6 kg (166 lb 11.2 oz)     Vital Signs with Ranges  Temp:  [96.2  F (35.7  C)-98.9  F (37.2  C)] 96.2  F (35.7  C)  Pulse:  [59] 59  Heart Rate:  [57-69] 57  Resp:  [16-20] 18  BP: (135-159)/(46-62) 135/56  SpO2:  [92 %-98 %] 98 %       Constitutional: No acute distress  Respiratory: Clear to auscultation bilaterally, no crackles  Cardiovascular: Regular rate and rhythm, S1, S2, no murmurs  GI: Abdomen soft, nontender, nondistended, bowel sounds are heard  Skin/Integumen: SIGNIFICANT FOR ERYTHEMA ON LATERAL ASPECT OF LEFT FOOT  NEURO:  Diminished sensation in EXT    Medications        vancomycin (VANCOCIN) IV  1,250 mg Intravenous Once     aspirin  81 mg Oral Daily     atorvastatin (LIPITOR) tablet 80 mg  80 mg Oral Daily     clopidogrel (PLAVIX) tablet 75 mg  75 mg Oral Daily     DULoxetine  30 mg Oral Daily     glipiZIDE  10 mg Oral BID AC     metFORMIN (GLUCOPHAGE) tablet 1,000 mg  1,000 mg Oral BID w/meals     lisinopril (PRINIVIL/Zestril) tablet 2.5 mg  2.5 mg Oral Daily     ipratropium  2 spray Both Nostrils Q12H     metoprolol  50 mg Oral Daily     omeprazole  40 mg Oral Daily     psyllium  1 packet Oral Daily     tamsulosin  0.4 mg Oral Daily     sodium chloride (PF)  3 mL Intracatheter Q8H     piperacillin-tazobactam  3.375 g Intravenous Q6H     insulin aspart  1-7 Units Subcutaneous TID AC     insulin aspart  1-5 Units Subcutaneous At Bedtime       Data     Recent Labs  Lab 06/01/17  0656 05/31/17  1800   WBC 8.0 9.5   HGB 11.5* 12.4*   MCV 95 96    192    139   POTASSIUM 4.6 4.4   CHLORIDE 105 103   CO2 33* 30   BUN 12 15   CR 0.64* 0.62*   ANIONGAP 3 6   ALLISON 8.7 8.8   * 249*       Recent Results (from the past 24 hour(s))   Foot XR, G/E 3 views, left    Narrative    XR FOOT LT G/E 3 VW 5/31/2017 6:40 PM    COMPARISON: None.    HISTORY: Left foot pain.      Impression    IMPRESSION: Degenerative changes at the  left third proximal  interphalangeal joint. Osteopenia is noted in the left foot, but no  displaced fractures are seen.    CONNOR BURNS   MR Foot Left w/o & w Contrast    Narrative    MR FOOT LEFT WITHOUT AND WITH CONTRAST   6/1/2017 8:19 AM     HISTORY: Diabetic foot ulcer and cellulitis, concern for  osteomyelitis.    DOSE: 7 mL Gadavist.    COMPARISON: 5/31/2017 radiographs.    FINDINGS: There is a suspected soft tissue wound along the plantar  aspect of the fifth metatarsophalangeal joint. Fluid/edema is noted  about the fifth metatarsophalangeal joint capsule. Ill-defined marrow  edema is also noted within the fifth metatarsal head and fifth toe  proximal phalangeal base without apparent osseous destruction.  Ill-defined subcutaneous edema is noted along the dorsum of the foot  as well as within the deeper soft tissues of the forefoot. No  rim-enhancing soft tissue fluid collection or abscess is visible  within the forefoot. Marrow signal within the forefoot is otherwise  within normal limits.      Impression    IMPRESSION:  1. Suspected soft tissue wound along the plantar aspect of the fifth  metatarsophalangeal joint.  2. Marrow edema within the fifth metatarsal head and fifth toe  proximal phalangeal base. Given proximity to soft tissue wound, this  is highly suspicious for osteomyelitis.  3. Fifth metatarsophalangeal joint periarticular fluid signal  intensity. While there is no rim enhancement, this could represent  developing soft tissue abscess adjacent to the joint capsule.  4. Dorsal and deeper soft tissue edema within the forefoot. It should  be noted that MR cannot distinguish reactive edema from cellulitis.    VERO DOYLE MD   US KRYSTINA Doppler No Exercise    Narrative    US KRYSTINA DOPPLER NO EXERCISE  6/1/2017 2:19 PM     HISTORY: L foot ulceration, infection    COMPARISON: None.    FINDINGS:   The resting KRYSTINA on the left lower extremity is 0.65. A resting KRYSTINA in  the right lower extremity could not  be obtained due to vessel  noncompressibility.    Waveform analysis significantly dampened distal tibial waveforms.      Impression    IMPRESSION: Findings would indicate significant lower extremity  arterial insufficiency. In the setting of a nonhealing left foot  ulcer, further evaluation with an angiogram could be performed.      LIDIA YARBROUGH MD

## 2017-06-01 NOTE — H&P
DATE OF ADMISSION:  05/31/2017.       CHIEF COMPLAINT:  Dustin Pearce is an 89-year-old diabetic man who has a 6-month history of left foot ulcer, who now comes in with increasing redness, pain and warmth and drainage from the left foot wound.      HISTORY OF PRESENT ILLNESS:  The patient is an 89-year-old type 2 diabetic man who has been battling a left foot ulcer for the past 6 months under the care of Dr. Arrieta, podiatrist.  Since 3-4 days ago, the wife noticed some increased purulent drainage from the foot and then yesterday noted increased redness, swelling and warmth.  The patient also had a significant increase in pain in his foot.  Of note, he does have diabetic peripheral neuropathy.  He was seen by Dr. Arrieta in the [facility] earlier today and was referred to the ED for ongoing evaluation and treatment.  On review, he has no fevers, sweats, chills, nausea, vomiting, shortness of breath, chest pain, abdominal pain, diarrhea.      REVIEW OF SYSTEMS:  Comprehensive review is negative or otherwise listed in the HPI above.      PAST MEDICAL HISTORY:  Significant for carotid artery disease and CVA, paroxysmal atrial fibrillation, osteopenia, nephrolithiasis, hypertension, hyperlipidemia, acute myocardial infarction, GERD, DJD, diabetes mellitus type 2 with peripheral neuropathy, coronary artery disease, cardiomyopathy with an ejection fraction of 35%-40%, benign prostatic hyperplasia, essential hypertension and aortic root dilatation.      SURGICAL HISTORY:  Includes rotator cuff repair, left and right, laser lithotripsy and ureteral stent, hernia repair, cholecystectomy.      FAMILY HISTORY:  Mother with breast cancer, father with heart failure.      SOCIAL HISTORY:  Former smoker, quit in 1999, approximately 30-pack-year history.  Drinks on average 1 alcoholic beverage per week.  No other drug use.  The patient is .      ALLERGIES AND REACTIONS:  Oxycodone causes sweating.  Allergies  to sulfa, tetracycline, exact reaction unknown.        MEDICATIONS (YET TO BE CLARIFIED):     1.  Lisinopril 2.5 mg daily.   2.  Amitriptyline 25 mg each day at bedtime p.r.n.   3.  Atrovent nasal spray 0.3% 2 sprays both nostrils b.i.d.   4.  Doxycycline 100 mg b.i.d.     5.  Unclear if still taking Cymbalta 30 mg daily.   6.  Glucotrol 10 mg b.i.d.   7.  Toprol-XL 50 mg daily.   8.  Prilosec 40 mg daily.   9.  Flomax 0.4 mg daily.   10.  Aspirin 81 mg daily.   11.  Norco 1-2 tabs every 4 hours as needed for pain.   12.  Metformin 1000 mg b.i.d.   13.  Atorvastatin 80 mg daily.   14.  Clopidogrel 75 mg daily.      PHYSICAL EXAMINATION:   GENERAL:  The patient is a pleasant man in no acute apparent distress.   VITAL SIGNS:  Blood pressure is 156/62, pulse is 60, respirations 16, temperature is 98.9 degrees Fahrenheit, O2 sats 97% on room air, weight 73 kg.   SKIN:  Warm, dry, no jaundice.   HEENT:  No icterus.  Mucous membranes moist.  Oropharynx clear.   NECK:  Supple, no adenopathy or thyromegaly.   LUNGS:  Clear bilaterally, no wheezes, rhonchi or rales.   CARDIOVASCULAR:  Regular rate and rhythm.  I did not detect gallop or rub.  Chest wall is nontender.   ABDOMEN:  Soft, benign, no hepatosplenomegaly.  Bowel sounds are present.   GENITOURINARY:  Rectal was deferred.   EXTREMITIES:  With diminished peripheral pulses at the feet and ankles.  On the left foot, there is redness and warmth on the dorsum of the foot wrapping alf around under the plantar arch at the bottom of the foot.  There are small more punctate ulcerations over the MCP and in the plantar arch area.  Did not express any purulence from these areas.  This area is very tender to the touch.     NEUROLOGIC:  Gross motor exam intact in upper and lower extremities. Decreased sensation in the feet.  Did not test gait or Romberg.     LABORATORY DATA:  Reviewed.  Basic metabolic panel is normal.  Calcium 8.8, recent hemoglobin A1c in 02/21/2017 is 8.3.   CRP is elevated at 21.2, glucose is 249.  White count is 9.5, hemoglobin is 12.4, platelets of 192,000.  Sed rate is 43.      IMAGING:  X-ray of the left foot was reviewed.  There are degenerative changes noted and some osteopenia, no obvious fracture.  Blood cultures drawn.      ASSESSMENT:  The patient is an 89-year-old man with multiple medical problems including coronary vascular disease, carotid vascular disease, history of atrial fibrillation (not on anticoagulation), hypertension, hyperlipidemia, gastroesophageal reflux disease, cardiomyopathy, diabetes mellitus type 2, now presenting with a diabetic foot ulcer and acute cellulitis.  The patient has increased pain and purulence noted per significant other.      PLAN:  We will admit for IV antibiotics, vancomycin and Zosyn.  We will have ID see in consultation as well as Podiatry.  It is my understanding Dr. Arrieta does not have clinical privileges here, so will consult another podiatry group.  We will proceed with MRI of the left foot to look for either abscess and/or osteomyelitis which may impact the plan of care.  Will continue blood pressure and cardiac meds above as well as oral agents for his diabetes.  If glucoses are consistently greater than 180, may need to initiate sliding scale coverage.  Will give Tylenol and Norco for pain.  If debridement or other surgical intervention is needed, would likely need to stop aspirin and Plavix.         JACK FIGUEREDO MD             D: 2017 20:45   T: 2017 21:16   MT: KELI      Name:     SID AGUIAR   MRN:      [id number]        Account:      [id number]   :      1928           Admitted:     [id number]      Document: [id number]       cc: Matt Morrissey MD

## 2017-06-01 NOTE — PROGRESS NOTES
[facility]  June 1, 2017    ABIs showing significant arterial insufficiency.  Vascular Surgery consult placed.  Will delay foot surgery pending their evaluation/plan.    Moe Kirk DPM, FACFAS  Pager: [phone]

## 2017-06-01 NOTE — CONSULTS
[facility]    Infectious Disease Consultation     Date of Admission:  5/31/2017  Date of Consult (When I saw the patient): 06/01/17    Assessment & Plan   Dustin Pearce is a 89 year old male who was admitted on 5/31/2017. I was asked to see the patient for Left foot osteomyelitis.     Impression:   89 y.o male with DM, peripheral neuropathy.   Admitted with worsening in the non healing left foot ulcer.   MRI concerning for osteo.   Podiatry evaluation underway.   Started on antibiotics.     Recommendations:   Continue on Zosyn.   Will follow up on the interventions planned by podiatry.         Anastasia Cee MD    Reason for Consult   Reason for consult: I was asked by Dr. Kirk to evaluate this patient for above mentioned.     Primary Care Physician   Matt Morrissey    Chief Complaint   Left foot non healing wound     History is obtained from the patient and medical records    History of Present Illness   Dustin Pearce is a 89 year old male type 2 diabetic who has had a left foot ulcer for the past 6 months.  Since 3-4 days ago, the wife noticed some increased purulent drainage from the foot and then yesterday noted increased redness, swelling and warmth.  The patient also had a significant increase in pain in his foot as well.  Of note, he does have diabetic peripheral neuropathy.  He was seen by Dr. Arrieta in the [facility] earlier today and was referred to the ED for ongoing evaluation and treatment.  On review, he has no fevers, sweats, chills, nausea, vomiting, shortness of breath, chest pain, abdominal pain, diarrhea. Imaging concerning for osteo.     Past Medical History   I have reviewed this patient's medical history and updated it with pertinent information if needed.   Past Medical History:   Diagnosis Date     Aortic root dilatation (H)      Benign essential hypertension 1/6/2017     Benign non-nodular prostatic hyperplasia with lower urinary tract symptoms  10/20/2016     CAD (coronary artery disease)      Cardiomyopathy (H)      Carotid artery stenosis 10/20/2016     Carotid occlusion, left      Coronary artery disease involving native coronary artery of native heart without angina pectoris 10/20/2016     Diabetes mellitus (H)      DJD (degenerative joint disease)      Gastro-oesophageal reflux disease      Gastroesophageal reflux disease without esophagitis 10/20/2016     Heart attack (H)      Hyperlipidemia      Hypertension      Mild cognitive impairment 10/20/2016     Nephrolithiasis     RECURRENT     Osteopenia      Paroxysmal atrial fibrillation (H)      PONV (postoperative nausea and vomiting)      Unspecified cerebral artery occlusion with cerebral infarction        Past Surgical History   I have reviewed this patient's surgical history and updated it with pertinent information if needed.  Past Surgical History:   Procedure Laterality Date     CHOLECYSTECTOMY       ESOPHAGOSCOPY, GASTROSCOPY, DUODENOSCOPY (EGD), COMBINED N/A 12/26/2016    Procedure: COMBINED ESOPHAGOSCOPY, GASTROSCOPY, DUODENOSCOPY (EGD);  Surgeon: Nasim Louis MD;  Location:  GI     GENITOURINARY SURGERY      KIDNEY STONE REMOVAL X 4     HC UGI ENDOSCOPY W EUS N/A 12/29/2016    Procedure: COMBINED ENDOSCOPIC ULTRASOUND, ESOPHAGOSCOPY, GASTROSCOPY, DUODENOSCOPY (EGD);  Surgeon: Nasim Louis MD;  Location:  GI     HERNIA REPAIR       LASER HOLMIUM LITHOTRIPSY URETER(S), INSERT STENT, COMBINED  3/2/2012    Procedure:COMBINED CYSTOSCOPY, URETEROSCOPY, LASER HOLMIUM LITHOTRIPSY URETER(S), INSERT STENT; CYSTOSCOPY, RIGHT RETROGRADES, RIGHT URETEROSCOPY, HOLMIUM LASER, RIGHT STENT PLACEMENT; Surgeon:ANJELICA LEÓN; Location: OR     ROTATOR CUFF REPAIR RT/LT         Prior to Admission Medications   Prior to Admission Medications   Prescriptions Last Dose Informant Patient Reported? Taking?   AMITRIPTYLINE HCL PO Past Month at prn Spouse/Significant Other Yes Yes    Sig: Take 25 mg by mouth nightly as needed for sleep   ATORVASTATIN CALCIUM PO 5/31/2017 at Unknown time Spouse/Significant Other Yes Yes   Sig: Take 80 mg by mouth daily   Clopidogrel Bisulfate, [id number], (PLAVIX PO) 5/31/2017 at Unknown time Spouse/Significant Other Yes Yes   Sig: Take 75 mg by mouth daily    DOXYCYCLINE HYCLATE PO 5/31/2017 at Unknown time Spouse/Significant Other Yes Yes   Sig: Take 100 mg by mouth 2 times daily For 10 days   DULoxetine (CYMBALTA) 30 MG EC capsule 5/31/2017 at Unknown time Spouse/Significant Other No Yes   Sig: Take 1 capsule (30 mg) by mouth daily   HYDROcodone-acetaminophen (NORCO) 5-325 MG per tablet Past Month at prn Spouse/Significant Other Yes Yes   Sig: Take 1-2 tablets by mouth every 4 hours as needed for moderate to severe pain Reported on 3/7/2017   LISINOPRIL PO 5/31/2017 at Unknown time Spouse/Significant Other Yes Yes   Sig: Take 2.5 mg by mouth daily   METFORMIN HCL PO 5/31/2017 at x1 Spouse/Significant Other Yes Yes   Sig: Take 1,000 mg by mouth 2 times daily (with meals)   Psyllium (METAMUCIL PO) Past Month at prn Spouse/Significant Other Yes Yes   Sig: Take 1 packet by mouth daily    aspirin EC 81 MG EC tablet 5/31/2017 at Unknown time Spouse/Significant Other No Yes   Sig: Take 1 tablet (81 mg) by mouth daily   blood glucose monitoring (NO BRAND SPECIFIED) test strip  Spouse/Significant Other No No   Sig: Use to test blood sugar three times daily or as directed.   glipiZIDE (GLUCOTROL) 10 MG tablet 5/31/2017 at x1 Spouse/Significant Other No Yes   Sig: Take 1 tablet (10 mg) by mouth 2 times daily (before meals)   ipratropium (ATROVENT) 0.03 % spray Past Week at Unknown time Spouse/Significant Other Yes Yes   Sig: Spray 2 sprays into both nostrils every 12 hours   metoprolol (TOPROL-XL) 50 MG 24 hr tablet 5/31/2017 at Unknown time Spouse/Significant Other No Yes   Sig: Take 1 tablet (50 mg) by mouth daily   omeprazole (PRILOSEC) 40 MG capsule 5/31/2017 at  "Unknown time Spouse/Significant Other No Yes   Sig: Take 1 capsule (40 mg) by mouth daily Take 30-60 minutes before a meal.   order for DME  Spouse/Significant Other No No   Sig: Equipment being ordered: [employer] Blood Glucose meter.   tamsulosin (FLOMAX) 0.4 MG capsule 5/30/2017 at Unknown time Spouse/Significant Other No Yes   Sig: Take 1 capsule (0.4 mg) by mouth daily      Facility-Administered Medications: None     Allergies   Allergies   Allergen Reactions     Oxycodone Other (See Comments)     \"TERRIBLE SWEATING\"     Sulfa Drugs      Tetracycline        Immunization History   Immunization History   Administered Date(s) Administered     Influenza (High Dose) 3 valent vaccine 10/16/2016     Pneumococcal (PCV 13) 12/05/2014     Pneumococcal 23 valent 01/01/2000     TD (ADULT, 7+) 11/01/2010     Zoster vaccine, live 01/01/2012       Social History   I have reviewed this patient's social history and updated it with pertinent information if needed. Dustin Pearce  reports that he quit smoking about 18 years ago. His smoking use included Cigarettes. He has a 30.00 pack-year smoking history. He has never used smokeless tobacco. He reports that he drinks about 4.2 oz of alcohol per week  He reports that he does not use illicit drugs.    Family History   I have reviewed this patient's family history and updated it with pertinent information if needed.   Family History   Problem Relation Age of Onset     Breast Cancer Mother      Heart Failure Father 81       Review of Systems   The 10 point Review of Systems is negative other than noted in the HPI or here.     Physical Exam   Temp: 97.2  F (36.2  C) Temp src: Oral BP: 148/46 Pulse: 59 Heart Rate: 58 Resp: 16 SpO2: 98 % O2 Device: None (Room air)    Vital Signs with Ranges  Temp:  [97.2  F (36.2  C)-98.9  F (37.2  C)] 97.2  F (36.2  C)  Pulse:  [59] 59  Heart Rate:  [58-69] 58  Resp:  [16-20] 16  BP: (137-159)/(46-62) 148/46  SpO2:  [92 %-98 %] 98 %  166 lbs 11.2 " oz    GENERAL APPEARANCE: alert and no distress  EYES: Eyes grossly normal to inspection, PERRL and conjunctivae and sclerae normal  HENT: ear canals and TM's normal and nose and mouth without ulcers or lesions  NECK: no adenopathy, no asymmetry, masses, or scars and thyroid normal to palpation  RESP: lungs clear to auscultation - no rales, rhonchi or wheezes  CV: regular rates and rhythm, normal S1 S2, no S3 or S4 and no murmur, click or rub  LYMPHATICS: normal ant/post cervical and supraclavicular nodes  ABDOMEN: soft, nontender, without hepatosplenomegaly or masses and bowel sounds normal  MS: extremities normal- a small ulcer in the foot with surrounding redness   SKIN: no suspicious lesions or rashes  NEURO: Normal strength and tone, mentation intact and speech normal  PSYCH: mentation appears normal and affect normal/bright    Data   Lab Results   Component Value Date    WBC 8.0 06/01/2017    HGB 11.5 (L) 06/01/2017    HCT 35.9 (L) 06/01/2017     06/01/2017     06/01/2017    POTASSIUM 4.6 06/01/2017    CHLORIDE 105 06/01/2017    CO2 33 (H) 06/01/2017    BUN 12 06/01/2017    CR 0.64 (L) 06/01/2017     (H) 06/01/2017    SED 43 (H) 05/31/2017    TROPI 0.030 01/20/2017    AST 43 12/27/2016    ALT 50 12/27/2016    ALKPHOS 90 12/27/2016    BILITOTAL 0.7 12/27/2016    INR 1.03 01/20/2017       Recent Labs  Lab 05/31/17 1930 05/31/17  1905   CULT No growth after 8 hours No growth after 10 hours     Recent Labs   Lab Test  05/31/17 1930 05/31/17   1905   CULT  No growth after 8 hours  No growth after 10 hours

## 2017-06-02 ENCOUNTER — APPOINTMENT (OUTPATIENT)
Dept: ULTRASOUND IMAGING | Facility: CLINIC | Age: 82
DRG: 617 | End: 2017-06-02
Attending: SURGERY
Payer: MEDICARE

## 2017-06-02 ENCOUNTER — APPOINTMENT (OUTPATIENT)
Dept: INTERVENTIONAL RADIOLOGY/VASCULAR | Facility: CLINIC | Age: 82
DRG: 617 | End: 2017-06-02
Attending: SURGERY
Payer: MEDICARE

## 2017-06-02 LAB
CREAT SERPL-MCNC: 0.66 MG/DL (ref 0.66–1.25)
GFR SERPL CREATININE-BSD FRML MDRD: NORMAL ML/MIN/1.7M2
GLUCOSE BLDC GLUCOMTR-MCNC: 125 MG/DL (ref 70–99)
GLUCOSE BLDC GLUCOMTR-MCNC: 157 MG/DL (ref 70–99)
GLUCOSE BLDC GLUCOMTR-MCNC: 175 MG/DL (ref 70–99)
GLUCOSE BLDC GLUCOMTR-MCNC: 205 MG/DL (ref 70–99)

## 2017-06-02 PROCEDURE — 27210906 ZZH KIT CR8

## 2017-06-02 PROCEDURE — 27210806 ZZH SHEATH CR5

## 2017-06-02 PROCEDURE — 27210886 ZZH ACCESSORY CR5

## 2017-06-02 PROCEDURE — 00000146 ZZHCL STATISTIC GLUCOSE BY METER IP

## 2017-06-02 PROCEDURE — A9270 NON-COVERED ITEM OR SERVICE: HCPCS | Mod: GY | Performed by: INTERNAL MEDICINE

## 2017-06-02 PROCEDURE — 36415 COLL VENOUS BLD VENIPUNCTURE: CPT | Performed by: INTERNAL MEDICINE

## 2017-06-02 PROCEDURE — C1769 GUIDE WIRE: HCPCS

## 2017-06-02 PROCEDURE — 27210808 ZZH SHEATH CR7

## 2017-06-02 PROCEDURE — 99233 SBSQ HOSP IP/OBS HIGH 50: CPT | Performed by: INTERNAL MEDICINE

## 2017-06-02 PROCEDURE — C1876 STENT, NON-COA/NON-COV W/DEL: HCPCS

## 2017-06-02 PROCEDURE — 75710 ARTERY X-RAYS ARM/LEG: CPT | Mod: LT

## 2017-06-02 PROCEDURE — 047L3ZZ DILATION OF LEFT FEMORAL ARTERY, PERCUTANEOUS APPROACH: ICD-10-PCS | Performed by: RADIOLOGY

## 2017-06-02 PROCEDURE — 25000128 H RX IP 250 OP 636: Performed by: RADIOLOGY

## 2017-06-02 PROCEDURE — 99221 1ST HOSP IP/OBS SF/LOW 40: CPT | Performed by: SURGERY

## 2017-06-02 PROCEDURE — 27210804 ZZH SHEATH CR3

## 2017-06-02 PROCEDURE — 27210742 ZZH CATH CR1

## 2017-06-02 PROCEDURE — 25000128 H RX IP 250 OP 636: Performed by: INTERNAL MEDICINE

## 2017-06-02 PROCEDURE — 12000000 ZZH R&B MED SURG/OB

## 2017-06-02 PROCEDURE — 27210845 ZZH DEVICE INFLATION CR5

## 2017-06-02 PROCEDURE — C1773 RET DEV, INSERTABLE: HCPCS

## 2017-06-02 PROCEDURE — 27210892 ZZH CATH CR4

## 2017-06-02 PROCEDURE — 93880 EXTRACRANIAL BILAT STUDY: CPT

## 2017-06-02 PROCEDURE — 047D3DZ DILATION OF LEFT COMMON ILIAC ARTERY WITH INTRALUMINAL DEVICE, PERCUTANEOUS APPROACH: ICD-10-PCS | Performed by: RADIOLOGY

## 2017-06-02 PROCEDURE — 25000125 ZZHC RX 250: Performed by: RADIOLOGY

## 2017-06-02 PROCEDURE — 25000132 ZZH RX MED GY IP 250 OP 250 PS 637: Mod: GY | Performed by: INTERNAL MEDICINE

## 2017-06-02 PROCEDURE — 82565 ASSAY OF CREATININE: CPT | Performed by: INTERNAL MEDICINE

## 2017-06-02 PROCEDURE — 93970 EXTREMITY STUDY: CPT

## 2017-06-02 RX ORDER — FENTANYL CITRATE 50 UG/ML
25-50 INJECTION, SOLUTION INTRAMUSCULAR; INTRAVENOUS EVERY 5 MIN PRN
Status: DISCONTINUED | OUTPATIENT
Start: 2017-06-02 | End: 2017-06-02 | Stop reason: HOSPADM

## 2017-06-02 RX ORDER — SODIUM CHLORIDE 9 MG/ML
INJECTION, SOLUTION INTRAVENOUS CONTINUOUS
Status: ACTIVE | OUTPATIENT
Start: 2017-06-02 | End: 2017-06-05

## 2017-06-02 RX ORDER — FENTANYL CITRATE 50 UG/ML
INJECTION, SOLUTION INTRAMUSCULAR; INTRAVENOUS
Status: DISCONTINUED
Start: 2017-06-02 | End: 2017-06-02 | Stop reason: HOSPADM

## 2017-06-02 RX ORDER — LIDOCAINE HYDROCHLORIDE 10 MG/ML
1-30 INJECTION, SOLUTION EPIDURAL; INFILTRATION; INTRACAUDAL; PERINEURAL
Status: DISCONTINUED | OUTPATIENT
Start: 2017-06-02 | End: 2017-06-02 | Stop reason: HOSPADM

## 2017-06-02 RX ORDER — FLUMAZENIL 0.1 MG/ML
0.2 INJECTION, SOLUTION INTRAVENOUS
Status: DISCONTINUED | OUTPATIENT
Start: 2017-06-02 | End: 2017-06-02 | Stop reason: HOSPADM

## 2017-06-02 RX ORDER — NALOXONE HYDROCHLORIDE 0.4 MG/ML
.1-.4 INJECTION, SOLUTION INTRAMUSCULAR; INTRAVENOUS; SUBCUTANEOUS
Status: DISCONTINUED | OUTPATIENT
Start: 2017-06-02 | End: 2017-06-02 | Stop reason: HOSPADM

## 2017-06-02 RX ORDER — ACETAMINOPHEN 500 MG
500-1000 TABLET ORAL EVERY 6 HOURS PRN
Status: DISCONTINUED | OUTPATIENT
Start: 2017-06-02 | End: 2017-06-02

## 2017-06-02 RX ORDER — HEPARIN SODIUM 1000 [USP'U]/ML
INJECTION, SOLUTION INTRAVENOUS; SUBCUTANEOUS
Status: DISCONTINUED
Start: 2017-06-02 | End: 2017-06-02 | Stop reason: HOSPADM

## 2017-06-02 RX ORDER — IODIXANOL 320 MG/ML
150 INJECTION, SOLUTION INTRAVASCULAR ONCE
Status: COMPLETED | OUTPATIENT
Start: 2017-06-02 | End: 2017-06-02

## 2017-06-02 RX ADMIN — IODIXANOL 100 ML: 320 INJECTION, SOLUTION INTRAVASCULAR at 14:36

## 2017-06-02 RX ADMIN — SODIUM CHLORIDE: 9 INJECTION, SOLUTION INTRAVENOUS at 16:31

## 2017-06-02 RX ADMIN — IPRATROPIUM BROMIDE 2 SPRAY: 21 SPRAY NASAL at 09:27

## 2017-06-02 RX ADMIN — IPRATROPIUM BROMIDE 2 SPRAY: 21 SPRAY NASAL at 22:46

## 2017-06-02 RX ADMIN — MIDAZOLAM HYDROCHLORIDE 0.5 MG: 1 INJECTION, SOLUTION INTRAMUSCULAR; INTRAVENOUS at 13:03

## 2017-06-02 RX ADMIN — GLIPIZIDE 10 MG: 10 TABLET ORAL at 16:31

## 2017-06-02 RX ADMIN — OMEPRAZOLE 40 MG: 20 CAPSULE, DELAYED RELEASE ORAL at 09:28

## 2017-06-02 RX ADMIN — MIDAZOLAM HYDROCHLORIDE 0.5 MG: 1 INJECTION, SOLUTION INTRAMUSCULAR; INTRAVENOUS at 12:59

## 2017-06-02 RX ADMIN — PIPERACILLIN SODIUM,TAZOBACTAM SODIUM 3.38 G: 3; .375 INJECTION, POWDER, FOR SOLUTION INTRAVENOUS at 22:46

## 2017-06-02 RX ADMIN — PIPERACILLIN SODIUM,TAZOBACTAM SODIUM 3.38 G: 3; .375 INJECTION, POWDER, FOR SOLUTION INTRAVENOUS at 01:35

## 2017-06-02 RX ADMIN — FENTANYL CITRATE 25 MCG: 50 INJECTION, SOLUTION INTRAMUSCULAR; INTRAVENOUS at 12:59

## 2017-06-02 RX ADMIN — METOPROLOL SUCCINATE 50 MG: 50 TABLET, EXTENDED RELEASE ORAL at 09:28

## 2017-06-02 RX ADMIN — HYDROCODONE BITARTRATE AND ACETAMINOPHEN 2 TABLET: 5; 325 TABLET ORAL at 22:50

## 2017-06-02 RX ADMIN — FENTANYL CITRATE 25 MCG: 50 INJECTION, SOLUTION INTRAMUSCULAR; INTRAVENOUS at 13:03

## 2017-06-02 RX ADMIN — PSYLLIUM HUSK 1 PACKET: 3.4 POWDER ORAL at 09:19

## 2017-06-02 RX ADMIN — ATORVASTATIN CALCIUM 80 MG: 40 TABLET, FILM COATED ORAL at 09:28

## 2017-06-02 RX ADMIN — PIPERACILLIN SODIUM,TAZOBACTAM SODIUM 3.38 G: 3; .375 INJECTION, POWDER, FOR SOLUTION INTRAVENOUS at 16:30

## 2017-06-02 RX ADMIN — GLIPIZIDE 10 MG: 10 TABLET ORAL at 06:43

## 2017-06-02 RX ADMIN — LISINOPRIL 2.5 MG: 2.5 TABLET ORAL at 09:38

## 2017-06-02 RX ADMIN — TAMSULOSIN HYDROCHLORIDE 0.4 MG: 0.4 CAPSULE ORAL at 09:28

## 2017-06-02 RX ADMIN — CLOPIDOGREL BISULFATE 75 MG: 75 TABLET, FILM COATED ORAL at 09:28

## 2017-06-02 RX ADMIN — DULOXETINE HYDROCHLORIDE 30 MG: 30 CAPSULE, DELAYED RELEASE ORAL at 09:28

## 2017-06-02 RX ADMIN — PIPERACILLIN SODIUM,TAZOBACTAM SODIUM 3.38 G: 3; .375 INJECTION, POWDER, FOR SOLUTION INTRAVENOUS at 09:22

## 2017-06-02 RX ADMIN — ASPIRIN 81 MG: 81 TABLET, COATED ORAL at 09:29

## 2017-06-02 RX ADMIN — HYDROCODONE BITARTRATE AND ACETAMINOPHEN 2 TABLET: 5; 325 TABLET ORAL at 15:30

## 2017-06-02 NOTE — PLAN OF CARE
Problem: Goal Outcome Summary  Goal: Goal Outcome Summary  Outcome: No Change  A&Ox4. VSS.Denies pain, Norco given x1 at bedtime per pt request to prevent pain. 4 wounds noted on bottom of L foot. No drainage, wrapped in Kerlix. Keep legs elevated and float heels at all times while in bed.  Up with assist x1 + cane. L PIV SL. Receiving IV zosyn q6h. MRI this AM, please see results. Podiatry and ID consulted. ABIs ordered. Plan for possible vascular surgery consult pending results. Continue to monitor.

## 2017-06-02 NOTE — PROGRESS NOTES
SPIRITUAL HEALTH SERVICES Progress Note  [facility]    Visit per pt request.  Pt said that he had seen Reina and Fr Dent yesterday and that he has no needs at this time, declining a visit.  SH expressed availability upon request.                                                                                                                                               Kendra Lee M.Div.  Chaplain Resident  Pager [phone]

## 2017-06-02 NOTE — CONSULTS
VASCULAR SURGERY CONSULTATION      CHIEF COMPLAINT:  Gangrenous left fifth toe.      HISTORY OF PRESENT ILLNESS:  Dustin Pearce is an 89-year-old patient with diabetes, coronary artery disease, cerebral vascular disease and hypertension who developed a sore over his left fifth toe approximately 6 months ago.  He had been followed by Dr. Arrieta of Podiatry.  The patient has been offloading and limiting his ambulation on the toe.  He has noticed over the last 3-4 days increasing pain and erythema of the site with some purulent drainage.  The patient was admitted to [facility] on 05/31/2017.  He has been seen by Dr. Moe Kirk of Podiatric Surgery.  The patient obviously will require toe amputation, but is very concerned about his distal circulation.  There are no palpable distal pulses, and KRYSTINA was diminished.  We were therefore asked to see the patient in vascular surgical consultation.      The patient reports that the ulcer developed spontaneously with no trauma.  He does have some mild diabetic peripheral neuropathy involving both of his feet, but still has gross sensation.  He has never had any problems in his right leg.  He denies any classic claudication type symptoms.      ALLERGIES:  Oxycodone, sulfa and tetracycline.      MEDICATIONS AT ADMISSION:   1.  Lisinopril 2.5 mg daily.   2.  Toprol-XL 50 mg daily.   3.  Metformin 1000 mg b.i.d.   4.  Glucotrol 10 mg b.i.d.   5.  Lipitor 80 mg daily.   6.  Plavix 75 mg daily.   7.  Prilosec 40 mg daily.   8.  Flomax 0.4 mg daily.   9.  Aspirin 81 mg daily.   10.  The patient had been on doxycycline 100 mg p.o. b.i.d. for the foot infection at the time of admission.     11.  Amitriptyline 25 mg each day at bedtime p.r.n.   12.  possibly on Cymbalta 30 mg daily.      PAST MEDICAL HISTORY:   1.  History of coronary disease.  He has had a distant myocardial infarction.  He apparently underwent angiography, but no intervention.  Most recent ejection fraction  was 35-40%.   2.  Essential hypertension.   3.  History of cerebrovascular accident.  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  Apparently not related to his carotid arteries.  He had a good recovery eventually.   4.  Paroxysmal atrial fibrillation.   5.  Nephrolithiasis.   6.  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      PAST SURGICAL HISTORY:   1.  Cholecystectomy.   2.  Herniorrhaphy.     3.  Rotator cuff repair bilaterally.      SOCIAL HISTORY:  The patient quit smoking in 1999 after a 30-pack-year history.  He has 1 alcoholic beverage weekly.  The patient is  and has been living independently.      PHYSICAL EXAMINATION:   GENERAL:  The patient is alert and appropriate.   VITAL SIGNS:  He is afebrile.  Vital signs are stable.  Height is 5 feet 5 inches.  Weight is 75.8 kilograms.  BMI is 27.8 kg/m2.   HEENT:  Remarkable for bilateral loud carotid bruits.   CHEST:  Clear to auscultation.   CARDIOVASCULAR:  Regular without murmur.   ABDOMEN:  Mildly obese, soft and nontender.   EXTREMITIES:  There are gangrenous changes with erythema and marked tenderness to the left fifth toe.  This does not extend significantly onto the forefoot.  This area is markedly tender to touch.  He does have light touch sensation to both of his feet.  There is no edema in either calf.  There are no ulcerations of the right foot, though he does have some calluses on the plantar aspect.  +3 radial pulses bilaterally with a +3 right femoral pulse.  I cannot palpate the left femoral pulse.  I cannot palpate either popliteal or pedal pulses.      DATA:  On 06/02/2017, serum creatinine was 0.66 with a GFR calculated at greater than 90.        On 06/01/2017, sodium was 141, potassium 4.6 and calcium 8.7.  White count was 8,000 and hemoglobin 11.5.      On 06/02/2017, KRYSTINA revealed calcified noncompressible vessels on the right with dampened wave forms.  KRYSTINA on  the left was 0.65 with a dampened monophasic waveform indicative of severe peripheral artery disease.      MRI of his left foot from 2017 revealed a likely osteomyelitis of the fifth metatarsal head.      IMPRESSION:     1.  Gangrenous- infected- nonhealing left fifth toe ulcer. He has significant peripheral artery disease, particularly involving the left leg.  He may have iliac disease also with the decreased left femoral pulse.  The patient does need to have improved blood supply for any chance of healing.  We therefore will perform an aortogram with left leg runoff by Dr. Lindquist of Interventional Radiology today.  If a lesion is amenable to angioplasty, this will be performed.  This has been discussed with the patient along with his significant other, Halina (on the phone).  We will map his greater saphenous veins in case a bypass would be necessary, and this will help us determine how aggressive we should be with Interventional Radiology to improve blood supply to his foot.   2.  Significant bilateral carotid bruits.  The patient is at obvious risk for carotid bifurcation disease.  A screening carotid duplex ultrasound will also be performed today in case surgery is necessary for the other problems.      I spent over 40 minutes with the patient today with over 50% in consultation.         ROLAND CUNNINGHAM MD             D: 2017 11:21   T: 2017 13:23   MT: [id number]      Name:     SID AGUIAR   MRN:      [id number]        Account:       [id number]   :      1928           Consult Date:  2017      Document: [id number]       cc: Roland Cunningham MD

## 2017-06-02 NOTE — PROGRESS NOTES
Significant stenosis left common iliac artery stented with 10mm stent. Left SFA stenosis angioplastied w 4 mm balloon. Improved flow to single vessel runnoff AT which occludes at ankle gives rise to collaterals which reconstitues  DP and PT in foot.

## 2017-06-02 NOTE — PLAN OF CARE
Problem: Goal Outcome Summary  Goal: Goal Outcome Summary  Outcome: No Change  A&Ox4. VSS. Denies pain. 4 wounds noted on bottom of L foot. No drainage, wrapped in Kerlix. Keep legs elevated and float heels at all times while in bed.  Up with assist x1 + cane 1x. L PIV SL. Receiving IV zosyn q6h. Podiatry and ID consulted. ABIs ordered. Plan for possible vascular surgery consult pending results. Continue to monitor.

## 2017-06-02 NOTE — PROGRESS NOTES
Vascular Surgery Progress Note    S:Tolerated angio well.    O:   Vitals:  BP  Min: 107/70  Max: 170/69  Temp  Av.8  F (36  C)  Min: 96.6  F (35.9  C)  Max: 96.9  F (36.1  C)  Pulse  Av.3  Min: 57  Max: 70  I/O last 3 completed shifts:  In: 360 [P.O.:360]  Out: -     Physical Exam: ANGIO reviewed with pt.  > 90% proximal left MEG stenosis stented with good result.  CFA and PF=OK      Distal SFA with 50-60% irregular stenosis=  PTA done with mild residual irregularity but much better.  Both peroneal and PT occluded at origins.  AT relatively disease free to ankle where occluded.  Multiple collaterals to PT plantar branches and small DP.      Assessment/Plan:  He should now have enough blood flow to heal 5th ray amputation.  If he does not could consider bypass to DP or PT for salvage attempt.      Wm. Jennifer MD

## 2017-06-02 NOTE — PROGRESS NOTES
[facility]    Infectious Disease Progress Note    Date of Service (when I saw the patient): 06/02/2017     Assessment & Plan   Dustin Pearce is a 89 year old male who was admitted on 5/31/2017.     Impression:   89 y.o male with DM, peripheral neuropathy.   Admitted with worsening in the non healing left foot ulcer.   MRI concerning for osteo.   Podiatry evaluation underway.   Started on antibiotics.      Recommendations:   Continue on Zosyn.   Will follow up on the interventions planned by podiatry/ vascular.     Anastasia Cee MD    Interval History   Afebrile, negative blood cultures   No new complaints     Physical Exam   Temp: 96.6  F (35.9  C) Temp src: Oral BP: 160/60   Heart Rate: 62 Resp: 16 SpO2: 93 % O2 Device: None (Room air)    Vitals:    05/31/17 1724 06/01/17 0500 06/02/17 0652   Weight: 73 kg (161 lb) 75.6 kg (166 lb 11.2 oz) 75.8 kg (167 lb 1.7 oz)     Vital Signs with Ranges  Temp:  [96.2  F (35.7  C)-96.9  F (36.1  C)] 96.6  F (35.9  C)  Heart Rate:  [55-66] 62  Resp:  [16-18] 16  BP: (126-160)/(54-65) 160/60  SpO2:  [91 %-98 %] 93 %    Constitutional: Awake, alert, cooperative, no apparent distress  Lungs: Clear to auscultation bilaterally, no crackles or wheezing  Cardiovascular: Regular rate and rhythm, normal S1 and S2, and no murmur noted  Abdomen: Normal bowel sounds, soft, non-distended, non-tender  Skin: No rashes, no cyanosis, no edema  Other:    Medications        insulin aspart  1-10 Units Subcutaneous TID AC     insulin aspart  1-7 Units Subcutaneous At Bedtime     vancomycin (VANCOCIN) IV  1,250 mg Intravenous Once     aspirin  81 mg Oral Daily     atorvastatin (LIPITOR) tablet 80 mg  80 mg Oral Daily     clopidogrel (PLAVIX) tablet 75 mg  75 mg Oral Daily     DULoxetine  30 mg Oral Daily     glipiZIDE  10 mg Oral BID AC     lisinopril (PRINIVIL/Zestril) tablet 2.5 mg  2.5 mg Oral Daily     ipratropium  2 spray Both Nostrils Q12H     metoprolol  50 mg Oral Daily      omeprazole  40 mg Oral Daily     psyllium  1 packet Oral Daily     tamsulosin  0.4 mg Oral Daily     sodium chloride (PF)  3 mL Intracatheter Q8H     piperacillin-tazobactam  3.375 g Intravenous Q6H       Data   All microbiology laboratory data reviewed.  Recent Labs   Lab Test  06/01/17   0656  05/31/17   1800  03/07/17   2142   WBC  8.0  9.5  11.6*   HGB  11.5*  12.4*  14.0   HCT  35.9*  38.5*  44.1   MCV  95  96  97   PLT  158  192  198     Recent Labs   Lab Test  06/02/17   0707  06/01/17   0656  05/31/17   1800   CR  0.66  0.64*  0.62*     Recent Labs   Lab Test  05/31/17   1800   SED  43*     Recent Labs   Lab Test  05/31/17   1930  05/31/17   1905   CULT  No growth after 2 days  No growth after 2 days

## 2017-06-02 NOTE — PROGRESS NOTES
[facility]  June 2, 2017    Attempted to see pt, but he was at IR.  Will see pt tomorrow.  Foot surgery pending Vascular plan.  Dressing change ordered.    Moe Kirk DPM, FACFAS  Pager: [phone]

## 2017-06-02 NOTE — PLAN OF CARE
Problem: Goal Outcome Summary  Goal: Goal Outcome Summary  Outcome: No Change   A&Ox4. VSS. Pain in foot managed by reposition. 4 wounds noted on bottom of L foot. Serous drainage, wrapped in Kerlix, changed x1.  Keep legs elevated and float heels at all times while in bed. Up with assist x1 + cane 1x. L PIV SL. Receiving IV zosyn q6h. Podiatry and ID consulted.   Pt down for ultrasound and angiogram today. Continue to monitor.

## 2017-06-03 LAB
CREAT SERPL-MCNC: 0.68 MG/DL (ref 0.66–1.25)
GFR SERPL CREATININE-BSD FRML MDRD: NORMAL ML/MIN/1.7M2
GLUCOSE BLDC GLUCOMTR-MCNC: 123 MG/DL (ref 70–99)
GLUCOSE BLDC GLUCOMTR-MCNC: 147 MG/DL (ref 70–99)
GLUCOSE BLDC GLUCOMTR-MCNC: 177 MG/DL (ref 70–99)
GLUCOSE BLDC GLUCOMTR-MCNC: 233 MG/DL (ref 70–99)
GLUCOSE BLDC GLUCOMTR-MCNC: 243 MG/DL (ref 70–99)

## 2017-06-03 PROCEDURE — 00000146 ZZHCL STATISTIC GLUCOSE BY METER IP

## 2017-06-03 PROCEDURE — 99233 SBSQ HOSP IP/OBS HIGH 50: CPT | Performed by: PODIATRIST

## 2017-06-03 PROCEDURE — 36415 COLL VENOUS BLD VENIPUNCTURE: CPT | Performed by: INTERNAL MEDICINE

## 2017-06-03 PROCEDURE — 25000132 ZZH RX MED GY IP 250 OP 250 PS 637: Mod: GY | Performed by: INTERNAL MEDICINE

## 2017-06-03 PROCEDURE — 82565 ASSAY OF CREATININE: CPT | Performed by: INTERNAL MEDICINE

## 2017-06-03 PROCEDURE — 99233 SBSQ HOSP IP/OBS HIGH 50: CPT | Performed by: INTERNAL MEDICINE

## 2017-06-03 PROCEDURE — A9270 NON-COVERED ITEM OR SERVICE: HCPCS | Mod: GY | Performed by: INTERNAL MEDICINE

## 2017-06-03 PROCEDURE — 99232 SBSQ HOSP IP/OBS MODERATE 35: CPT | Performed by: SURGERY

## 2017-06-03 PROCEDURE — 12000000 ZZH R&B MED SURG/OB

## 2017-06-03 PROCEDURE — 25000128 H RX IP 250 OP 636: Performed by: INTERNAL MEDICINE

## 2017-06-03 PROCEDURE — 25000131 ZZH RX MED GY IP 250 OP 636 PS 637: Mod: GY | Performed by: INTERNAL MEDICINE

## 2017-06-03 PROCEDURE — 25000128 H RX IP 250 OP 636: Performed by: RADIOLOGY

## 2017-06-03 RX ADMIN — PIPERACILLIN SODIUM,TAZOBACTAM SODIUM 3.38 G: 3; .375 INJECTION, POWDER, FOR SOLUTION INTRAVENOUS at 10:29

## 2017-06-03 RX ADMIN — LISINOPRIL 2.5 MG: 2.5 TABLET ORAL at 11:22

## 2017-06-03 RX ADMIN — IPRATROPIUM BROMIDE 2 SPRAY: 21 SPRAY NASAL at 21:25

## 2017-06-03 RX ADMIN — TAMSULOSIN HYDROCHLORIDE 0.4 MG: 0.4 CAPSULE ORAL at 11:22

## 2017-06-03 RX ADMIN — PSYLLIUM HUSK 1 PACKET: 3.4 POWDER ORAL at 11:21

## 2017-06-03 RX ADMIN — INSULIN GLARGINE 4 UNITS: 100 INJECTION, SOLUTION SUBCUTANEOUS at 21:26

## 2017-06-03 RX ADMIN — SODIUM CHLORIDE: 9 INJECTION, SOLUTION INTRAVENOUS at 11:18

## 2017-06-03 RX ADMIN — ASPIRIN 81 MG: 81 TABLET, COATED ORAL at 11:22

## 2017-06-03 RX ADMIN — GLIPIZIDE 10 MG: 10 TABLET ORAL at 06:33

## 2017-06-03 RX ADMIN — METOPROLOL SUCCINATE 50 MG: 50 TABLET, EXTENDED RELEASE ORAL at 11:22

## 2017-06-03 RX ADMIN — OMEPRAZOLE 40 MG: 20 CAPSULE, DELAYED RELEASE ORAL at 11:22

## 2017-06-03 RX ADMIN — GLIPIZIDE 10 MG: 10 TABLET ORAL at 16:34

## 2017-06-03 RX ADMIN — IPRATROPIUM BROMIDE 2 SPRAY: 21 SPRAY NASAL at 11:23

## 2017-06-03 RX ADMIN — ATORVASTATIN CALCIUM 80 MG: 40 TABLET, FILM COATED ORAL at 11:22

## 2017-06-03 RX ADMIN — PIPERACILLIN SODIUM,TAZOBACTAM SODIUM 3.38 G: 3; .375 INJECTION, POWDER, FOR SOLUTION INTRAVENOUS at 22:41

## 2017-06-03 RX ADMIN — HYDROCODONE BITARTRATE AND ACETAMINOPHEN 2 TABLET: 5; 325 TABLET ORAL at 12:30

## 2017-06-03 RX ADMIN — PIPERACILLIN SODIUM,TAZOBACTAM SODIUM 3.38 G: 3; .375 INJECTION, POWDER, FOR SOLUTION INTRAVENOUS at 04:06

## 2017-06-03 RX ADMIN — DULOXETINE HYDROCHLORIDE 30 MG: 30 CAPSULE, DELAYED RELEASE ORAL at 11:22

## 2017-06-03 RX ADMIN — PIPERACILLIN SODIUM,TAZOBACTAM SODIUM 3.38 G: 3; .375 INJECTION, POWDER, FOR SOLUTION INTRAVENOUS at 16:34

## 2017-06-03 NOTE — PROGRESS NOTES
"[facility]  Naya 3, 2017    A/P:  L chronic foot ulceration, PAD, MRI indicated osteomyelitis and possible early abscess.    Discussed condition and treatment options including pros and cons.    Vascular feels pt has enough flow to likely heal partial 5th ray amp s/p intervention.  Appreciated.  Will proceed with L foot partial amputation tomorrow AM.  Discussed pt may need multiple procedures pending intraop findings, as area may need to be left open.  NPO after midnight.    Potential risks associated with surgery include spreading of infection to non-infected areas, continued open wound at the surgical site, recurrent ulceration at the same or different location, hematoma, osteomyelitis, repeat surgery or amputation and blood clot.  Surgery is oftentimes partly for biopsy and results may necessitate further surgical resection.  Surgery attempts to improve the structure or function of the foot but that is not always possible.     Discussed with pt and wife.    Moe Kirk DPM, FACFAS  Pager: [phone]    S:  Pt seen at bedside.  Feeling well.  Denies f/c/nv.  Wife present.    O:  /48 (BP Location: Right arm)  Pulse 68  Temp 96.4  F (35.8  C) (Oral)  Resp 18  Ht 1.651 m (5' 5\")  Wt 75.8 kg (167 lb 1.7 oz)  SpO2 96%  BMI 27.81 kg/m2  General appearance: Patient is alert and fully cooperative with history & exam.  No sign of distress is noted during the visit.      Psychiatric: Affect is pleasant & appropriate.  Patient appears motivated to improve health.      Respiratory: Breathing is regular & unlabored while sitting.      HEENT: Hearing is intact to spoken word.  Speech is clear.  No gross evidence of visual impairment that would impact ambulation.      Dermatologic: Approx 4mm x 2mm ulceration sub left 5th MPJ area with exposed fat layer.  Does not probe to bone.  Local erythema and edema extending to dorsal midfoot area.  4 small dry scabs to plantar left " foot likely from prior bandage.  Small blister noted to dorsal left 5th MPJ area.     Vascular: DP & PT pulses are not readily palpable.  Skin to feet cool.  CRT delayed.  Mild L foot pitting edema.      Neurologic: Lower extremity sensation is diminished to light touch b/l.        Musculoskeletal: Patient is ambulatory without assistive device or brace.  No gross ankle deformity noted.  No foot or ankle joint effusion is noted.        Imaging reviewed with pt:     XR FOOT LT G/E 3 VW 5/31/2017 6:40 PM      COMPARISON: None.      HISTORY: Left foot pain.          IMPRESSION: Degenerative changes at the left third proximal  interphalangeal joint. Osteopenia is noted in the left foot, but no  displaced fractures are seen.      [place] FOOT LEFT WITHOUT AND WITH CONTRAST   6/1/2017 8:19 AM       HISTORY: Diabetic foot ulcer and cellulitis, concern for  osteomyelitis.      DOSE: 7 mL Gadavist.      COMPARISON: 5/31/2017 radiographs.      FINDINGS: There is a suspected soft tissue wound along the plantar  aspect of the fifth metatarsophalangeal joint. Fluid/edema is noted  about the fifth metatarsophalangeal joint capsule. Ill-defined marrow  edema is also noted within the fifth metatarsal head and fifth toe  proximal phalangeal base without apparent osseous destruction.  Ill-defined subcutaneous edema is noted along the dorsum of the foot  as well as within the deeper soft tissues of the forefoot. No  rim-enhancing soft tissue fluid collection or abscess is visible  within the forefoot. Marrow signal within the forefoot is otherwise  within normal limits.          IMPRESSION:  1. Suspected soft tissue wound along the plantar aspect of the fifth  metatarsophalangeal joint.  2. Marrow edema within the fifth metatarsal head and fifth toe  proximal phalangeal base. Given proximity to soft tissue wound, this  is highly suspicious for osteomyelitis.  3. Fifth metatarsophalangeal joint periarticular fluid  signal  intensity. While there is no rim enhancement, this could represent  developing soft tissue abscess adjacent to the joint capsule.  4. Dorsal and deeper soft tissue edema within the forefoot. It should  be noted that MR cannot distinguish reactive edema from cellulitis.      VERO DOYLE MD

## 2017-06-03 NOTE — PROGRESS NOTES
[facility]    Hospitalist Progress Note    Date of Service (when I saw the patient): 06/03/2017    Assessment & Plan   Sid Aguiar is a 89 year old male who was admitted on 5/31/2017.     1.  Left foot diabetic ulcer with cellulitis, concerning for osteomyelitis  MRI very suspicious for osteomyelitis  On Zosyn. BC NGTD. Appreciate ID and Podiatry input.     Appreciate vascular surgery input. Podiatry is planning likely ray and toe amputation.       2. Peripheral arterial disease  KRYSTINA suggests significant arterial insufficiency:     Vascular surgery consulted. Patient underwent angiogram on 6/2    Significant stenosis found in left common iliac artery which was stented with a 10mm stent. Left SFA stenosis noted and was angioplastied w 4 mm balloon    Vascular team feesl patient should have enough blood supply for left toe/ray amputation    3. DM II with peripheral neuropathy: uncotrolled.     Results for SID AGUIAR (MRN [id number]) as of 6/3/2017 11:44   Ref. Range 6/2/2017 17:42 6/2/2017 22:25 6/3/2017 02:50 6/3/2017 08:37 6/3/2017 11:24   Glucose Latest Ref Range: 70 - 99 mg/dL 125 (H) 205 (H) 147 (H) 177 (H) 233 (H)     High intensity ISS. On glipizide. Holding metformin. Will add lantus 4 units at bedtime    4. CAD, cerebrovascular disease  On asa, lipitor,  metoprolol. Holding plavix for now as surgery is likely    Will also hold lisinopril pending surgery.     5. Afib  On metoprolol  Not on anticoagulation    6. Depression: on amitryptiline and duloxetine.     7. GERD: continue omeprazole    8. BPH:  Continue flomax.     DVT Prophylaxis: Pneumatic Compression Devices  Code Status: Full Code    Disposition: Expected discharge in 3+ days    Time 35 minutes      Min Garcia MD  [phone] (P)  Text Page (7 am to 6 pm)    Interval History      Seen with VAN Lynch  Feels better today. Still has difficulty understanding what is going on.  Discussed osteomyelitis and vascular disease in  detail and reviewed events thus far of the hospitalization.     -Data reviewed today: I reviewed all new labs and imaging results over the last 24 hours. I personally reviewed no images or EKG's today.    Physical Exam   Temp: 96.4  F (35.8  C) Temp src: Oral BP: 165/48 Pulse: 68 Heart Rate: 73 Resp: 18 SpO2: 96 % O2 Device: None (Room air) Oxygen Delivery: 3 LPM  Vitals:    06/01/17 0500 06/02/17 0652 06/03/17 0653   Weight: 75.6 kg (166 lb 11.2 oz) 75.8 kg (167 lb 1.7 oz) 75.8 kg (167 lb 1.7 oz)     Vital Signs with Ranges  Temp:  [96.3  F (35.7  C)-98.1  F (36.7  C)] 96.4  F (35.8  C)  Pulse:  [57-70] 68  Heart Rate:  [62-73] 73  Resp:  [16-18] 18  BP: (107-170)/(48-71) 165/48  SpO2:  [92 %-100 %] 96 %  I/O last 3 completed shifts:  In: 742 [I.V.:742]  Out: -     Constitutional: No acute distress  HEENT:  No facial asymmetry, EOMI  Respiratory: Clear to auscultation bilaterally, no crackles  Cardiovascular: Regular rate and rhythm, S1, S2, no murmurs  GI: Abdomen soft, nontender, nondistended, bowel sounds are heard  Skin/Integumen: foot presently bandaged, c/d/i.       Medications     NaCl 100 mL/hr at 06/03/17 1118       insulin aspart  1-10 Units Subcutaneous TID AC     insulin aspart  1-7 Units Subcutaneous At Bedtime     aspirin  81 mg Oral Daily     atorvastatin (LIPITOR) tablet 80 mg  80 mg Oral Daily     DULoxetine  30 mg Oral Daily     glipiZIDE  10 mg Oral BID AC     lisinopril (PRINIVIL/Zestril) tablet 2.5 mg  2.5 mg Oral Daily     ipratropium  2 spray Both Nostrils Q12H     metoprolol  50 mg Oral Daily     omeprazole  40 mg Oral Daily     psyllium  1 packet Oral Daily     tamsulosin  0.4 mg Oral Daily     sodium chloride (PF)  3 mL Intracatheter Q8H     piperacillin-tazobactam  3.375 g Intravenous Q6H       Data     Recent Labs  Lab 06/03/17  0655 06/02/17  0707 06/01/17  0656 05/31/17  1800   WBC  --   --  8.0 9.5   HGB  --   --  11.5* 12.4*   MCV  --   --  95 96   PLT  --   --  158 192   NA  --   --   141 139   POTASSIUM  --   --  4.6 4.4   CHLORIDE  --   --  105 103   CO2  --   --  33* 30   BUN  --   --  12 15   CR 0.68 0.66 0.64* 0.62*   ANIONGAP  --   --  3 6   ALLISON  --   --  8.7 8.8   GLC  --   --  164* 249*       Recent Results (from the past 24 hour(s))   US Lower Extremity Venous Mapping Bilateral    Narrative    ULTRASOUND BILATERAL LOWER EXTREMITY VENOUS MAPPING   6/2/2017 12:20  PM     HISTORY: Left toe ulcer.    COMPARISON: None.      Impression    IMPRESSION: Bilateral great saphenous veins are patent from the groin  to the ankle. On the right, the diameter gradually tapers from 4.9 mm  near the saphenofemoral junction to 1 mm in the mid calf. On the left,  the diameter gradually tapers from 4.5 mm near the saphenofemoral  junction to 2.5 mm in the mid calf.    MICHAEL CHURCHILL MD   IR Lower Extremity Angiogram Left    Narrative    IR LOWER EXTREMITY ANGIOGRAM LEFT  6/2/2017 2:46 PM     HISTORY: 89-year-old man with a nonhealing wound in the left foot.    COMPARISON: Ankle-brachial indices dated 6/1/2017.    DESCRIPTION OF PROCEDURE: After obtaining informed consent, the  patient was placed in a supine position on the fluoroscopy table. Both  groins were prepped and draped in the usual sterile manner. 1%  lidocaine was injected for local anesthesia. Ultrasound was used to  evaluate and document patency of the right common femoral artery.  Under sterile ultrasound guidance, access into the right common  femoral artery was obtained. An image was saved for documentation. An  Omni Flush catheter was passed into the artery into the distal  abdominal aorta for a pelvic angiogram.    Under ultrasound guidance, access into the proximal left superficial  femoral artery was obtained. A 6 Pashto vascular sheath was placed. A  left lower extremity angiogram was performed through this sheath.    FINDINGS:   Pelvic angiogram: Severe stenosis in the proximal left common iliac  artery. Moderate stenoses in the mid  distal left common iliac artery.    Left lower extremity angiogram:  Common femoral artery: No significant stenosis.  Superficial femoral artery: Moderate to severe stenoses near the  adductor canal.  Popliteal artery: No significant stenosis.  Tibial arteries: Single-vessel runoff via the anterior tibial artery.  The anterior tibial artery occludes at the ankle. It gives rise to  collaterals which reconstitute the dorsalis pedis artery and posterior  tibial artery branches in the left foot.    Intervention: A BUSTER 1 catheter was able to cross the stenoses in the  left common iliac artery. A 7 Welsh sheath was then placed on the  left side and used to cross the stenoses in the left common iliac  artery. These were stented with a 9 mm x 57 mm balloon expandable  stent. The stent was then postdilated with a 10 mm balloon. Follow-up  angiogram showed improvement in luminal diameter.    Next, a 6 Welsh crossover sheath was maneuvered from the right  femoral puncture site over the iliac bifurcation into the left common  iliac artery. A Sanjeev-Cross catheter was used to pass the stenoses in  the left superficial femoral artery. These were angioplastied with a 4  mm balloon. Follow-up angiogram showed improvement in luminal  diameter.    The crossover sheath was then pulled back and the common iliac artery  stent was again angioplastied using a 10 mm balloon to make certain  that it had not infolded. Follow-up angiogram showed good flow across  the stent.    Both sheaths were pulled. Pressure was held at the puncture sites for  10 minutes with good hemostasis.    The patient tolerated the procedure well. There were no immediate  postprocedure complications. The patient's vital signs were monitored  by radiology nursing staff under my supervision and remained stable  throughout the study.    MEDICATIONS: 1 mg Versed, 50 mg fentanyl    Sedation time: 93 minutes    Fluoroscopy time: 22.3 minutes    Total fluoroscopy dose: 394  mGy    Contrast: 100 mL Visipaque      Impression    IMPRESSION: Angiography showed significant stenoses in the left common  iliac artery and left distal superficial femoral artery. The common  iliac artery stenoses were stented and the superficial femoral artery  stenoses were angioplastied. There is improved luminal diameter and  flow across the intervened upon segments. Patient has single-vessel  runoff via the anterior tibial artery which occludes at the ankle and  reconstitutes the DP and posterior tibial branches in the foot.    LIDIA YARBROUGH MD   US Carotid Bilateral    Narrative    ULTRASOUND CAROTID BILATERAL  6/2/2017  2:57 PM    HISTORY: Carotid bruit.    FINDINGS:  Right Carotid: There is dense hyperechoic plaque throughout the right  carotid system.  Peak systolic and diastolic velocities in the  internal carotid artery are 221/32 cm/sec.  ICA/CCA ratio is 2.8.   Flow is antegrade in the vertebral artery.      Left Carotid: There is shadowing plaque throughout the left carotid  system, particularly at the bifurcation. The left mid and distal  internal carotid artery are occluded.   Flow is antegrade in the  vertebral artery.      SUMMARY:    1. Relative to the diameter of the normal internal carotid artery,  there is 50-79% stenosis in the right proximal ICA.  2. 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    MICHAEL CHURCHILL MD

## 2017-06-03 NOTE — PLAN OF CARE
Problem: Goal Outcome Summary  Goal: Goal Outcome Summary  Outcome: No Change   A&Ox4. VSS. Pain in leg, prn Norco given x2, reports relief of pain . 4 wounds noted on bottom of L foot. Wrapped in Kerlix, changed x1,CDI.  Keep legs elevated and float heels at all times while in bed. Up with assist x1 + cane 1x. Receiving IV zosyn q6h. Podiatry and ID consulted.   Pt down for ultrasound and angiogram today, no sign/symptom of hematoma,CMS intact. Continue to monitor.

## 2017-06-03 NOTE — PLAN OF CARE
Problem: Goal Outcome Summary  Goal: Goal Outcome Summary  A&Ox4. VSS. Denied pain in LLE. 4 wounds noted on bottom of L foot. Wrapped in Kerlix, CDI.  Keep legs elevated and float heels at all times while in bed. Up with assist x1 + cane 1x. Receiving IV zosyn q6h. NS @ 100cc/hr. Up to BR 1x. 2 angiogram sites on both sides of groin CDI. Continue to monitor.

## 2017-06-03 NOTE — PLAN OF CARE
Problem: Goal Outcome Summary  Goal: Goal Outcome Summary  Outcome: No Change  Patient is A&OX4, VSS, CMS intact except for bilateral neuropathy at baseline. Up with SBA and his boot for Left foot. Voiding in restroom. Mod carb Diet. Occasional left foot pain controlled with norco. Awaiting podiatry recommendations for eventual surgery for osteomyelitis.  Bilateral groin sites from angio and vessel stenting yesterday CDI, no pain, no ecchymosis. Vascular surgery satisfied with blood flow. Hospitalist and ID also following. Will continue to monitor.

## 2017-06-03 NOTE — PROGRESS NOTES
Vascular Surgery Progress Note    S: No complaints.    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   Moderate right ICA stenosis  (no treatment)      Assessment/Plan: He should have enough blood supply to left foot for toe/ray amputation.                                  On chronic Plavix   --    Continue                                     Yearly right carotid Duplex      Wm. Jennifer MD

## 2017-06-04 ENCOUNTER — ANESTHESIA EVENT (OUTPATIENT)
Dept: SURGERY | Facility: CLINIC | Age: 82
DRG: 617 | End: 2017-06-04
Payer: MEDICARE

## 2017-06-04 ENCOUNTER — ANESTHESIA (OUTPATIENT)
Dept: SURGERY | Facility: CLINIC | Age: 82
DRG: 617 | End: 2017-06-04
Payer: MEDICARE

## 2017-06-04 LAB
GLUCOSE BLDC GLUCOMTR-MCNC: 119 MG/DL (ref 70–99)
GLUCOSE BLDC GLUCOMTR-MCNC: 135 MG/DL (ref 70–99)
GLUCOSE BLDC GLUCOMTR-MCNC: 161 MG/DL (ref 70–99)
GLUCOSE BLDC GLUCOMTR-MCNC: 163 MG/DL (ref 70–99)
GLUCOSE BLDC GLUCOMTR-MCNC: 174 MG/DL (ref 70–99)
GLUCOSE BLDC GLUCOMTR-MCNC: 174 MG/DL (ref 70–99)
GRAM STN SPEC: ABNORMAL
GRAM STN SPEC: NORMAL
Lab: ABNORMAL
MICRO REPORT STATUS: ABNORMAL
MICRO REPORT STATUS: NORMAL
POTASSIUM SERPL-SCNC: 4.1 MMOL/L (ref 3.4–5.3)
SPECIMEN SOURCE: ABNORMAL
SPECIMEN SOURCE: NORMAL

## 2017-06-04 PROCEDURE — A9270 NON-COVERED ITEM OR SERVICE: HCPCS | Mod: GY | Performed by: INTERNAL MEDICINE

## 2017-06-04 PROCEDURE — 37000008 ZZH ANESTHESIA TECHNICAL FEE, 1ST 30 MIN: Performed by: PODIATRIST

## 2017-06-04 PROCEDURE — 99233 SBSQ HOSP IP/OBS HIGH 50: CPT | Performed by: INTERNAL MEDICINE

## 2017-06-04 PROCEDURE — 40000170 ZZH STATISTIC PRE-PROCEDURE ASSESSMENT II: Performed by: PODIATRIST

## 2017-06-04 PROCEDURE — 25000128 H RX IP 250 OP 636: Performed by: NURSE ANESTHETIST, CERTIFIED REGISTERED

## 2017-06-04 PROCEDURE — 87070 CULTURE OTHR SPECIMN AEROBIC: CPT | Performed by: PODIATRIST

## 2017-06-04 PROCEDURE — 87075 CULTR BACTERIA EXCEPT BLOOD: CPT | Performed by: PODIATRIST

## 2017-06-04 PROCEDURE — 87077 CULTURE AEROBIC IDENTIFY: CPT | Performed by: PODIATRIST

## 2017-06-04 PROCEDURE — 88300 SURGICAL PATH GROSS: CPT | Mod: 26 | Performed by: PODIATRIST

## 2017-06-04 PROCEDURE — 25000131 ZZH RX MED GY IP 250 OP 636 PS 637: Mod: GY | Performed by: INTERNAL MEDICINE

## 2017-06-04 PROCEDURE — 87186 SC STD MICRODIL/AGAR DIL: CPT | Performed by: PODIATRIST

## 2017-06-04 PROCEDURE — 25000132 ZZH RX MED GY IP 250 OP 250 PS 637: Mod: GY | Performed by: INTERNAL MEDICINE

## 2017-06-04 PROCEDURE — 88311 DECALCIFY TISSUE: CPT | Mod: 26 | Performed by: PODIATRIST

## 2017-06-04 PROCEDURE — 88300 SURGICAL PATH GROSS: CPT | Performed by: PODIATRIST

## 2017-06-04 PROCEDURE — 87176 TISSUE HOMOGENIZATION CULTR: CPT | Performed by: PODIATRIST

## 2017-06-04 PROCEDURE — 00000146 ZZHCL STATISTIC GLUCOSE BY METER IP

## 2017-06-04 PROCEDURE — 88311 DECALCIFY TISSUE: CPT | Performed by: PODIATRIST

## 2017-06-04 PROCEDURE — 27210995 ZZH RX 272: Performed by: PODIATRIST

## 2017-06-04 PROCEDURE — 25000128 H RX IP 250 OP 636: Performed by: RADIOLOGY

## 2017-06-04 PROCEDURE — 0Y6N0ZF DETACHMENT AT LEFT FOOT, PARTIAL 5TH RAY, OPEN APPROACH: ICD-10-PCS | Performed by: PODIATRIST

## 2017-06-04 PROCEDURE — 25000128 H RX IP 250 OP 636: Performed by: ANESTHESIOLOGY

## 2017-06-04 PROCEDURE — 87205 SMEAR GRAM STAIN: CPT | Performed by: PODIATRIST

## 2017-06-04 PROCEDURE — 27210794 ZZH OR GENERAL SUPPLY STERILE: Performed by: PODIATRIST

## 2017-06-04 PROCEDURE — 12000000 ZZH R&B MED SURG/OB

## 2017-06-04 PROCEDURE — 25000125 ZZHC RX 250: Performed by: NURSE ANESTHETIST, CERTIFIED REGISTERED

## 2017-06-04 PROCEDURE — 36415 COLL VENOUS BLD VENIPUNCTURE: CPT | Performed by: ANESTHESIOLOGY

## 2017-06-04 PROCEDURE — 25000125 ZZHC RX 250: Performed by: PODIATRIST

## 2017-06-04 PROCEDURE — 25000128 H RX IP 250 OP 636: Performed by: INTERNAL MEDICINE

## 2017-06-04 PROCEDURE — 71000012 ZZH RECOVERY PHASE 1 LEVEL 1 FIRST HR: Performed by: PODIATRIST

## 2017-06-04 PROCEDURE — 88305 TISSUE EXAM BY PATHOLOGIST: CPT | Mod: 26 | Performed by: PODIATRIST

## 2017-06-04 PROCEDURE — 27110028 ZZH OR GENERAL SUPPLY NON-STERILE: Performed by: PODIATRIST

## 2017-06-04 PROCEDURE — 36000060 ZZH SURGERY LEVEL 3 W FLUORO 1ST 30 MIN: Performed by: PODIATRIST

## 2017-06-04 PROCEDURE — 37000009 ZZH ANESTHESIA TECHNICAL FEE, EACH ADDTL 15 MIN: Performed by: PODIATRIST

## 2017-06-04 PROCEDURE — 28810 AMPUTATION TOE & METATARSAL: CPT | Mod: T4 | Performed by: PODIATRIST

## 2017-06-04 PROCEDURE — 88305 TISSUE EXAM BY PATHOLOGIST: CPT | Performed by: PODIATRIST

## 2017-06-04 PROCEDURE — S0020 INJECTION, BUPIVICAINE HYDRO: HCPCS | Performed by: PODIATRIST

## 2017-06-04 PROCEDURE — 84132 ASSAY OF SERUM POTASSIUM: CPT | Performed by: ANESTHESIOLOGY

## 2017-06-04 PROCEDURE — 36000058 ZZH SURGERY LEVEL 3 EA 15 ADDTL MIN: Performed by: PODIATRIST

## 2017-06-04 RX ORDER — FENTANYL CITRATE 50 UG/ML
INJECTION, SOLUTION INTRAMUSCULAR; INTRAVENOUS PRN
Status: DISCONTINUED | OUTPATIENT
Start: 2017-06-04 | End: 2017-06-04

## 2017-06-04 RX ORDER — PROPOFOL 10 MG/ML
INJECTION, EMULSION INTRAVENOUS PRN
Status: DISCONTINUED | OUTPATIENT
Start: 2017-06-04 | End: 2017-06-04

## 2017-06-04 RX ORDER — ONDANSETRON 2 MG/ML
INJECTION INTRAMUSCULAR; INTRAVENOUS PRN
Status: DISCONTINUED | OUTPATIENT
Start: 2017-06-04 | End: 2017-06-04

## 2017-06-04 RX ORDER — ONDANSETRON 2 MG/ML
4 INJECTION INTRAMUSCULAR; INTRAVENOUS EVERY 30 MIN PRN
Status: DISCONTINUED | OUTPATIENT
Start: 2017-06-04 | End: 2017-06-04 | Stop reason: HOSPADM

## 2017-06-04 RX ORDER — MAGNESIUM HYDROXIDE 1200 MG/15ML
LIQUID ORAL PRN
Status: DISCONTINUED | OUTPATIENT
Start: 2017-06-04 | End: 2017-06-04 | Stop reason: HOSPADM

## 2017-06-04 RX ORDER — SODIUM CHLORIDE, SODIUM LACTATE, POTASSIUM CHLORIDE, CALCIUM CHLORIDE 600; 310; 30; 20 MG/100ML; MG/100ML; MG/100ML; MG/100ML
INJECTION, SOLUTION INTRAVENOUS CONTINUOUS
Status: DISCONTINUED | OUTPATIENT
Start: 2017-06-04 | End: 2017-06-04 | Stop reason: HOSPADM

## 2017-06-04 RX ORDER — DIMENHYDRINATE 50 MG/ML
12.5 INJECTION, SOLUTION INTRAMUSCULAR; INTRAVENOUS
Status: DISCONTINUED | OUTPATIENT
Start: 2017-06-04 | End: 2017-06-04 | Stop reason: HOSPADM

## 2017-06-04 RX ORDER — BUPIVACAINE HYDROCHLORIDE 5 MG/ML
INJECTION, SOLUTION EPIDURAL; INTRACAUDAL PRN
Status: DISCONTINUED | OUTPATIENT
Start: 2017-06-04 | End: 2017-06-04 | Stop reason: HOSPADM

## 2017-06-04 RX ORDER — MEPERIDINE HYDROCHLORIDE 25 MG/ML
12.5 INJECTION INTRAMUSCULAR; INTRAVENOUS; SUBCUTANEOUS EVERY 5 MIN PRN
Status: DISCONTINUED | OUTPATIENT
Start: 2017-06-04 | End: 2017-06-04 | Stop reason: HOSPADM

## 2017-06-04 RX ORDER — KETAMINE HYDROCHLORIDE 10 MG/ML
INJECTION, SOLUTION INTRAMUSCULAR; INTRAVENOUS PRN
Status: DISCONTINUED | OUTPATIENT
Start: 2017-06-04 | End: 2017-06-04

## 2017-06-04 RX ORDER — ONDANSETRON 4 MG/1
4 TABLET, ORALLY DISINTEGRATING ORAL EVERY 30 MIN PRN
Status: DISCONTINUED | OUTPATIENT
Start: 2017-06-04 | End: 2017-06-04 | Stop reason: HOSPADM

## 2017-06-04 RX ORDER — FENTANYL CITRATE 50 UG/ML
50-100 INJECTION, SOLUTION INTRAMUSCULAR; INTRAVENOUS
Status: DISCONTINUED | OUTPATIENT
Start: 2017-06-04 | End: 2017-06-04 | Stop reason: HOSPADM

## 2017-06-04 RX ORDER — PROPOFOL 10 MG/ML
INJECTION, EMULSION INTRAVENOUS CONTINUOUS PRN
Status: DISCONTINUED | OUTPATIENT
Start: 2017-06-04 | End: 2017-06-04

## 2017-06-04 RX ORDER — FENTANYL CITRATE 0.05 MG/ML
25-50 INJECTION, SOLUTION INTRAMUSCULAR; INTRAVENOUS
Status: DISCONTINUED | OUTPATIENT
Start: 2017-06-04 | End: 2017-06-04 | Stop reason: HOSPADM

## 2017-06-04 RX ADMIN — SODIUM CHLORIDE: 9 INJECTION, SOLUTION INTRAVENOUS at 12:28

## 2017-06-04 RX ADMIN — SODIUM CHLORIDE, POTASSIUM CHLORIDE, SODIUM LACTATE AND CALCIUM CHLORIDE: 600; 310; 30; 20 INJECTION, SOLUTION INTRAVENOUS at 03:16

## 2017-06-04 RX ADMIN — SODIUM CHLORIDE, POTASSIUM CHLORIDE, SODIUM LACTATE AND CALCIUM CHLORIDE: 600; 310; 30; 20 INJECTION, SOLUTION INTRAVENOUS at 09:12

## 2017-06-04 RX ADMIN — PIPERACILLIN SODIUM,TAZOBACTAM SODIUM 3.38 G: 3; .375 INJECTION, POWDER, FOR SOLUTION INTRAVENOUS at 03:36

## 2017-06-04 RX ADMIN — PROPOFOL 20 MG: 10 INJECTION, EMULSION INTRAVENOUS at 09:55

## 2017-06-04 RX ADMIN — OMEPRAZOLE 40 MG: 20 CAPSULE, DELAYED RELEASE ORAL at 13:42

## 2017-06-04 RX ADMIN — PIPERACILLIN SODIUM,TAZOBACTAM SODIUM 3.38 G: 3; .375 INJECTION, POWDER, FOR SOLUTION INTRAVENOUS at 22:33

## 2017-06-04 RX ADMIN — DEXMEDETOMIDINE HYDROCHLORIDE 8 MCG: 100 INJECTION, SOLUTION INTRAVENOUS at 09:55

## 2017-06-04 RX ADMIN — ATORVASTATIN CALCIUM 80 MG: 40 TABLET, FILM COATED ORAL at 13:41

## 2017-06-04 RX ADMIN — PIPERACILLIN SODIUM,TAZOBACTAM SODIUM 3.38 G: 3; .375 INJECTION, POWDER, FOR SOLUTION INTRAVENOUS at 16:22

## 2017-06-04 RX ADMIN — INSULIN GLARGINE 4 UNITS: 100 INJECTION, SOLUTION SUBCUTANEOUS at 22:33

## 2017-06-04 RX ADMIN — ASPIRIN 81 MG: 81 TABLET, COATED ORAL at 14:42

## 2017-06-04 RX ADMIN — DEXMEDETOMIDINE HYDROCHLORIDE 4 MCG: 100 INJECTION, SOLUTION INTRAVENOUS at 10:10

## 2017-06-04 RX ADMIN — FENTANYL CITRATE 25 MCG: 50 INJECTION, SOLUTION INTRAMUSCULAR; INTRAVENOUS at 10:10

## 2017-06-04 RX ADMIN — DULOXETINE HYDROCHLORIDE 30 MG: 30 CAPSULE, DELAYED RELEASE ORAL at 13:42

## 2017-06-04 RX ADMIN — PROPOFOL 20 MG: 10 INJECTION, EMULSION INTRAVENOUS at 10:13

## 2017-06-04 RX ADMIN — ONDANSETRON 4 MG: 2 INJECTION INTRAMUSCULAR; INTRAVENOUS at 10:03

## 2017-06-04 RX ADMIN — IPRATROPIUM BROMIDE 2 SPRAY: 21 SPRAY NASAL at 22:32

## 2017-06-04 RX ADMIN — ACETAMINOPHEN 650 MG: 325 TABLET, FILM COATED ORAL at 16:22

## 2017-06-04 RX ADMIN — TAMSULOSIN HYDROCHLORIDE 0.4 MG: 0.4 CAPSULE ORAL at 13:42

## 2017-06-04 RX ADMIN — KETAMINE HYDROCHLORIDE 5 MG: 10 INJECTION, SOLUTION INTRAMUSCULAR; INTRAVENOUS at 10:22

## 2017-06-04 RX ADMIN — FENTANYL CITRATE 25 MCG: 50 INJECTION, SOLUTION INTRAMUSCULAR; INTRAVENOUS at 10:21

## 2017-06-04 RX ADMIN — PIPERACILLIN SODIUM,TAZOBACTAM SODIUM 3.38 G: 3; .375 INJECTION, POWDER, FOR SOLUTION INTRAVENOUS at 09:48

## 2017-06-04 RX ADMIN — PROPOFOL 20 MCG/KG/MIN: 10 INJECTION, EMULSION INTRAVENOUS at 10:13

## 2017-06-04 RX ADMIN — FENTANYL CITRATE 25 MCG: 50 INJECTION, SOLUTION INTRAMUSCULAR; INTRAVENOUS at 09:55

## 2017-06-04 RX ADMIN — SODIUM CHLORIDE: 9 INJECTION, SOLUTION INTRAVENOUS at 01:29

## 2017-06-04 RX ADMIN — METOPROLOL SUCCINATE 50 MG: 50 TABLET, EXTENDED RELEASE ORAL at 08:21

## 2017-06-04 RX ADMIN — GLIPIZIDE 10 MG: 10 TABLET ORAL at 16:23

## 2017-06-04 ASSESSMENT — ENCOUNTER SYMPTOMS
ORTHOPNEA: 0
DYSRHYTHMIAS: 1
SEIZURES: 0

## 2017-06-04 ASSESSMENT — COPD QUESTIONNAIRES
COPD: 1
CAT_SEVERITY: MILD

## 2017-06-04 NOTE — OP NOTE
DATE OF SERVICE:  06/04/2017.      STAFF SURGEON:  Moe Kirk DPM      PREOPERATIVE DIAGNOSES:  Left foot osteomyelitis, abscess, peripheral arterial disease.      POSTOPERATIVE DIAGNOSES:  Left foot osteomyelitis, abscess, peripheral arterial disease.      PROCEDURE:  Left partial fifth ray amputation.      ANESTHESIA:  MAC with local block.      HEMOSTASIS:  Electrocautery and thrombin Gelfoam.      SPECIMENS:   1.  Left foot tissue for aerobic and anaerobic cultures.   2.  Left foot abscess fluid for aerobic and anaerobic cultures.   3.  Left fifth metatarsal head and fifth proximal phalanx for pathology to evaluate for osteomyelitis.   4.  Left fifth toe for pathology, gross only.   5.  Left fifth metatarsal proximal margin for pathology to evaluate for osteomyelitis.      INDICATIONS:  Dustin Pearce is an 89-year-old diabetic male who was admitted for a left foot infection.  MRI shows evidence of osteomyelitis of the proximal phalanx of the fifth toe and the distal fifth metatarsal, possible early abscess.  Patient was noted to have significant peripheral vascular disease upon admission.  Vascular Surgery was consulted and patient underwent angiogram with revascularization.  They feel he can likely heal a partial fifth ray amputation at this time.  Partial fifth ray amputation was recommended due to the severity of his infection.  Patient agreed.  Risks versus benefits discussed at length prior to the procedure.  Risks include, but are not limited to, continuing or new infection, delayed wound healing, continued open wound at surgical site, need for additional surgery including further amputation, hematoma, blood clot.      DESCRIPTION OF PROCEDURE:  All questions answered to patient's satisfaction.  Consent form signed and placed in chart.  Patient was brought into the operating room by the anesthesia team and placed supine on the table.  After IV sedation was given, a local block was obtained around the  left ankle and foot away from any areas of erythema.  Foot was prepped and draped in the usual sterile fashion.      Attention was directed to the plantar aspect of the left foot where the plantar fifth metatarsophalangeal joint ulceration was excised full thickness to bone.  This was a fairly small wound, but intraoperatively, this did probe to the fifth metatarsal head.  Some mild erythema in this area, but the main area of erythema was noted to be at the dorsal aspect of the fifth metatarsophalangeal joint.  Tissue in this area looked quite necrotic.  The fifth toe was amputated at the proximal interphalangeal joint and placed on the back table.  Purulent drainage was expressed from the toe at this time.  Further incision was carried out full thickness to bone along the dorsal lateral aspect of the fifth proximal phalanx and fifth metatarsal head.  Further purulence was noted along the fifth toe extensor tendon.  Tissue along this area was clearly necrotic and nonviable and had to be excised.  This was to healthy bleeding tissue with a 15 blade.  Area was thoroughly irrigated.  Further proximal full-thickness incision was carried out and soft tissue flaps were developed dorsally and plantarly, further exposing the fifth metatarsal and fifth proximal phalanx.  The proximal phalanx was disarticulated and removed.  This bone appeared infected, as did the 5th metatarsal head.  Utilizing a sagittal saw, the distal portion of the fifth metatarsal was resected to healthy bleeding bone.  Further soft tissue debridement was carried out locally up to and including fascia with a rongeur.  Area was again copiously lavaged with sterile saline.  Good healthy bleeding tissue appeared to be remaining at this point.  Hemostasis was achieved with electrocautery and thrombin Gelfoam.  One retention suture was placed to maintain flap length.  A sterile dressing was applied.      Patient was awakened from anesthesia and returned to  the PACU with vital signs stable and vascular status intact.  He tolerated the procedure and anesthesia well.      PLAN:  Patient will be returned to the inpatient floor and will continue on IV antibiotics.  He will need repeat I&D and hopeful delayed primary closure in 2-3 days.  Amputation site was left open today due to severity of the infection.  Our service will continue to follow.         MICHAEL KNIGHT DPM             D: 2017 12:21   T: 2017 13:40   MT:       Name:     SID AGUIAR   MRN:      [id number]        Account:        [id number]   :      1928           Procedure Date: 2017      Document: [id number]       cc: Michael Knight DPM

## 2017-06-04 NOTE — PLAN OF CARE
Problem: Goal Outcome Summary  Goal: Goal Outcome Summary  Outcome: No Change   Patient is A&OX4, elevated BP, other VSS, CMS intact except for bilateral neuropathy at baseline. Up with assist of 1 and his boot for Left foot. Voiding in restroom. Mod carb Diet, NPO for surgery tomorrow.  Bilateral groin sites from angio and vessel stenting yesterday CDI, no pain, no ecchymosis. Vascular surgery satisfied with blood flow. Hospitalist and ID also following. Will continue to monitor.

## 2017-06-04 NOTE — PROGRESS NOTES
[facility]    Hospitalist Progress Note    Date of Service (when I saw the patient): 06/04/2017    Assessment & Plan   Sid Aguiar is a 89 year old male who was admitted on 5/31/2017.     1.  Left foot diabetic ulcer with cellulitis,  osteomyelitis  S/P Left partial fifth ray amputation   POD#0  On  Zosyn since 5/31. BC NGTD.     ID following. Podiatry planning delayed primary closure in 2-3 days.     2. Peripheral arterial disease  KRYSTINA suggests significant arterial insufficiency:     Vascular surgery consulted. Patient underwent angiogram on 6/2    Significant stenosis found in left common iliac artery which was stented with a 10mm stent. Left SFA stenosis noted and was angioplastied w 4 mm balloon    Vascular team feesl patient should have enough blood supply for left toe/ray amputation    3. DM II with peripheral neuropathy: uncotrolled.   Results for SID AGUIAR (MRN [id number]) as of 6/4/2017 14:07   Ref. Range 6/3/2017 21:53 6/4/2017 02:14 6/4/2017 08:11 6/4/2017 11:58   Glucose Latest Ref Range: 70 - 99 mg/dL 243 (H) 174 (H) 161 (H) 135 (H)     High intensity ISS. On glipizide. Holding metformin. Added lantus 4 units at bedtime ojn 6/3    4. CAD, cerebrovascular disease  On asa, lipitor,  metoprolol. Hold plavix until delayed closure. Continue ASA    Holding lisinopril for until delayed closure.    5. Afib  On metoprolol  Not on anticoagulation    6. Depression: on amitryptiline and duloxetine.     7. GERD: continue omeprazole    8. BPH:  Continue flomax.     DVT Prophylaxis: Pneumatic Compression Devices  Code Status: Full Code    Disposition: Expected discharge in 3+ days    Time 40 min    Min Garcia MD  [phone] (P)  Text Page (7 am to 6 pm)    Interval History      Seen with VAN Lynch  Tolerated surgery. Son and significant other at bedside. Discussed current issues, surgery, likely POC for next few days with patient, son and SO.     Mr. Aguiar denies complaints at this  time.     -Data reviewed today: I reviewed all new labs and imaging results over the last 24 hours. I personally reviewed no images or EKG's today.    Physical Exam   Temp: 97.6  F (36.4  C) Temp src: Oral BP: 167/64 Pulse: 73 Heart Rate: 60 Resp: 16 SpO2: 98 % O2 Device: Nasal cannula Oxygen Delivery: 2 LPM  Vitals:    06/02/17 0652 06/03/17 0653 06/04/17 0500   Weight: 75.8 kg (167 lb 1.7 oz) 75.8 kg (167 lb 1.7 oz) 75.1 kg (165 lb 9.1 oz)     Vital Signs with Ranges  Temp:  [97.6  F (36.4  C)-99.8  F (37.7  C)] 97.6  F (36.4  C)  Pulse:  [73] 73  Heart Rate:  [57-90] 60  Resp:  [14-18] 16  BP: (120-178)/(47-69) 167/64  SpO2:  [93 %-100 %] 98 %  I/O last 3 completed shifts:  In: 1426 [P.O.:240; I.V.:1186]  Out: -     Constitutional: No acute distress  HEENT:  No facial asymmetry, EOMI  Respiratory: Clear to auscultation anteriorly no crackles  Cardiovascular: Regular rate and rhythm, S1, S2, no murmurs  GI: Abdomen soft, nontender, nondistended, bowel sounds are heard  Skin/Integumen: left foot bandaged, c/d/i.       Medications     NaCl 100 mL/hr at 06/04/17 1228       insulin glargine  4 Units Subcutaneous At Bedtime     insulin aspart  1-10 Units Subcutaneous TID AC     insulin aspart  1-7 Units Subcutaneous At Bedtime     aspirin  81 mg Oral Daily     atorvastatin (LIPITOR) tablet 80 mg  80 mg Oral Daily     DULoxetine  30 mg Oral Daily     glipiZIDE  10 mg Oral BID AC     ipratropium  2 spray Both Nostrils Q12H     metoprolol  50 mg Oral Daily     omeprazole  40 mg Oral Daily     psyllium  1 packet Oral Daily     tamsulosin  0.4 mg Oral Daily     sodium chloride (PF)  3 mL Intracatheter Q8H     piperacillin-tazobactam  3.375 g Intravenous Q6H       Data     Recent Labs  Lab 06/04/17  0830 06/03/17  0655 06/02/17  0707 06/01/17  0656 05/31/17  1800   WBC  --   --   --  8.0 9.5   HGB  --   --   --  11.5* 12.4*   MCV  --   --   --  95 96   PLT  --   --   --  158 192   NA  --   --   --  141 139   POTASSIUM 4.1   --   --  4.6 4.4   CHLORIDE  --   --   --  105 103   CO2  --   --   --  33* 30   BUN  --   --   --  12 15   CR  --  0.68 0.66 0.64* 0.62*   ANIONGAP  --   --   --  3 6   ALLISON  --   --   --  8.7 8.8   GLC  --   --   --  164* 249*       No results found for this or any previous visit (from the past 24 hour(s)).

## 2017-06-04 NOTE — ANESTHESIA CARE TRANSFER NOTE
Patient: Dustin B Fligge    Procedure(s):  Sun Add#3: partial left foot amputation - Sagital Saw - Mini C-Arm - Wound Class: IV-Dirty or Infected    Diagnosis: unknown  Diagnosis Additional Information: No value filed.    Anesthesia Type:   MAC     Note:  Airway :Nasal Cannula  Patient transferred to:PACU  Comments: History & intra-op course reported to PACU, RN, patient resting quietly      Vitals: (Last set prior to Anesthesia Care Transfer)    CRNA VITALS  6/4/2017 1039 - 6/4/2017 1115      6/4/2017             Resp Rate (set): 10                Electronically Signed By: ASPEN Carpenter CRNA  June 4, 2017  11:15 AM

## 2017-06-04 NOTE — PLAN OF CARE
Problem: Goal Outcome Summary  Goal: Goal Outcome Summary  Outcome: No Change  Patient is A&OX4, VSS. Up with assist of 1 and his boot for Left foot. Voiding in restroom x3, dribbling incontinence at times. Mod carb Diet, NPO for surgery tomorrow.  Bilateral groin sites from angio and vessel stenting CDI, no pain, no ecchymosis. Vascular surgery and ID following. Will continue to monitor.

## 2017-06-04 NOTE — PLAN OF CARE
Problem: Goal Outcome Summary  Goal: Goal Outcome Summary  Outcome: No Change  Patient is A&OX4, VSS, CMS intact except for bilateral neuropathy at baseline. POD 0, Unable to assess pulses on surgical foot because of surgical dressing. Dressing CDI. Assist of one to bedside to void in urinal, non weight bearing on LLE. Clear liquid Diet, ADAT. Denies pain currently. Bilateral groin sites from angio and vessel stenting CDI, no pain, no ecchymosis. Hospitalist and ID also following. Will continue to monitor.

## 2017-06-04 NOTE — ANESTHESIA POSTPROCEDURE EVALUATION
Patient: Dustin B Fligge    Procedure(s):  Sun Add#3: partial left foot amputation - Sagital Saw - Mini C-Arm - Wound Class: IV-Dirty or Infected    Diagnosis:unknown  Diagnosis Additional Information: No value filed.    Anesthesia Type:  MAC    Note:  Anesthesia Post Evaluation    Patient location during evaluation: PACU  Patient participation: Able to fully participate in evaluation  Level of consciousness: awake  Pain management: adequate  Airway patency: patent  Cardiovascular status: acceptable  Respiratory status: acceptable  Hydration status: acceptable  PONV: none     Anesthetic complications: None          Last vitals:  Vitals:    06/04/17 1338 06/04/17 1408 06/04/17 1438   BP: 167/64 169/66 167/62   Pulse:      Resp: 16 16 16   Temp:      SpO2: 98% 97% 98%         Electronically Signed By: Germain Tolbert MD  June 4, 2017  3:35 PM

## 2017-06-04 NOTE — BRIEF OP NOTE
[facility] Brief Operative Note    Pre-operative diagnosis: L foot osteomyelitis   Post-operative diagnosis * No post-op diagnosis entered *  same   Procedure: Procedure(s):  Sun Add#3: partial left foot amputation - Sagital Saw - Mini C-Arm - Wound Class: IV-Dirty or Infected   Surgeon(s): Surgeon(s) and Role:     * Moe Kirk DPM - Primary   Estimated blood loss: 50 mL    Specimens:   ID Type Source Tests Collected by Time Destination   1 : left foot Tissue Other ANAEROBIC BACTERIAL CULTURE, GRAM STAIN, TISSUE CULTURE AEROBIC BACTERIAL Moe Kirk DPM 6/4/2017 10:16 AM    2 : Abcess fluid left foot Fluid Other ANAEROBIC BACTERIAL CULTURE, FLUID CULTURE AEROBIC BACTERIAL, GRAM STAIN Moe Kirk DPM 6/4/2017 10:27 AM    A : left 5th metatarsal proximal margin evaluation for osteomylitis Tissue Other SURGICAL PATHOLOGY EXAM Moe Kirk DPM 6/4/2017 11:06 AM    B : left 5th proximal phalynx and 5th metatarsal head Tissue Other SURGICAL PATHOLOGY EXAM Moe Kirk DPM 6/4/2017 11:09 AM    C : LEFT 5TH TOE Tissue Other SURGICAL PATHOLOGY EXAM Moe Kirk DPM 6/4/2017 11:12 AM       Findings: See op note

## 2017-06-04 NOTE — ANESTHESIA PREPROCEDURE EVALUATION
"Procedure: Procedure(s):  AMPUTATE FOOT  Preop diagnosis: unknown    Allergies   Allergen Reactions     Oxycodone Other (See Comments)     \"TERRIBLE SWEATING\"     Sulfa Drugs      Tetracycline      Past Medical History:   Diagnosis Date     Aortic root dilatation (H)      Benign essential hypertension 1/6/2017     Benign non-nodular prostatic hyperplasia with lower urinary tract symptoms 10/20/2016     CAD (coronary artery disease)      Cardiomyopathy (H)      Carotid artery stenosis 10/20/2016     Carotid occlusion, left      Coronary artery disease involving native coronary artery of native heart without angina pectoris 10/20/2016     Diabetes mellitus (H)      DJD (degenerative joint disease)      Gastro-oesophageal reflux disease      Gastroesophageal reflux disease without esophagitis 10/20/2016     Heart attack (H)      Hyperlipidemia      Hypertension      Mild cognitive impairment 10/20/2016     Nephrolithiasis     RECURRENT     Osteopenia      Paroxysmal atrial fibrillation (H)      PONV (postoperative nausea and vomiting)      Unspecified cerebral artery occlusion with cerebral infarction      Past Surgical History:   Procedure Laterality Date     CHOLECYSTECTOMY       ESOPHAGOSCOPY, GASTROSCOPY, DUODENOSCOPY (EGD), COMBINED N/A 12/26/2016    Procedure: COMBINED ESOPHAGOSCOPY, GASTROSCOPY, DUODENOSCOPY (EGD);  Surgeon: Nasim Louis MD;  Location:  GI     GENITOURINARY SURGERY      KIDNEY STONE REMOVAL X 4     HC UGI ENDOSCOPY W EUS N/A 12/29/2016    Procedure: COMBINED ENDOSCOPIC ULTRASOUND, ESOPHAGOSCOPY, GASTROSCOPY, DUODENOSCOPY (EGD);  Surgeon: Nasim Louis MD;  Location:  GI     HERNIA REPAIR       LASER HOLMIUM LITHOTRIPSY URETER(S), INSERT STENT, COMBINED  3/2/2012    Procedure:COMBINED CYSTOSCOPY, URETEROSCOPY, LASER HOLMIUM LITHOTRIPSY URETER(S), INSERT STENT; CYSTOSCOPY, RIGHT RETROGRADES, RIGHT URETEROSCOPY, HOLMIUM LASER, RIGHT STENT PLACEMENT; Surgeon:ANJELICA LEÓN " PAT; Location:[facility] OR     ROTATOR CUFF REPAIR RT/LT       Prior to Admission medications    Medication Sig Start Date End Date Taking? Authorizing Provider   LISINOPRIL PO Take 2.5 mg by mouth daily   Yes Unknown, Entered By History   AMITRIPTYLINE HCL PO Take 25 mg by mouth nightly as needed for sleep   Yes Unknown, Entered By History   ipratropium (ATROVENT) 0.03 % spray Spray 2 sprays into both nostrils every 12 hours   Yes Unknown, Entered By History   DOXYCYCLINE HYCLATE PO Take 100 mg by mouth 2 times daily For 10 days 5/30/17 6/9/17 Yes Unknown, Entered By History   DULoxetine (CYMBALTA) 30 MG EC capsule Take 1 capsule (30 mg) by mouth daily 4/21/17  Yes Matt Morrissey MD   glipiZIDE (GLUCOTROL) 10 MG tablet Take 1 tablet (10 mg) by mouth 2 times daily (before meals) 4/21/17  Yes Matt Morrissey MD   metoprolol (TOPROL-XL) 50 MG 24 hr tablet Take 1 tablet (50 mg) by mouth daily 2/24/17  Yes Rafa Samuel MD   omeprazole (PRILOSEC) 40 MG capsule Take 1 capsule (40 mg) by mouth daily Take 30-60 minutes before a meal. 1/27/17  Yes Matt Morrissey MD   tamsulosin (FLOMAX) 0.4 MG capsule Take 1 capsule (0.4 mg) by mouth daily 1/27/17  Yes Matt Morrissey MD   aspirin EC 81 MG EC tablet Take 1 tablet (81 mg) by mouth daily 1/21/17  Yes Tra Reynoso MD   HYDROcodone-acetaminophen (NORCO) 5-325 MG per tablet Take 1-2 tablets by mouth every 4 hours as needed for moderate to severe pain Reported on 3/7/2017   Yes Unknown, Entered By History   METFORMIN HCL PO Take 1,000 mg by mouth 2 times daily (with meals)   Yes Reported, Patient   ATORVASTATIN CALCIUM PO Take 80 mg by mouth daily   Yes Reported, Patient   Psyllium (METAMUCIL PO) Take 1 packet by mouth daily    Yes Reported, Patient   Clopidogrel Bisulfate, [id number], (PLAVIX PO) Take 75 mg by mouth daily    Yes Reported, Patient   blood glucose monitoring (NO BRAND SPECIFIED) test strip Use to test blood sugar three times daily or as  directed. 9/8/16   Matt Morrissey MD   order for DME Equipment being ordered: [employer] Blood Glucose meter. 9/7/16   Matt Morrissey MD     Current Facility-Administered Medications Ordered in Epic   Medication Dose Route Frequency Last Rate Last Dose     lactated ringers infusion   Intravenous Continuous 25 mL/hr at 06/04/17 0316       insulin glargine (LANTUS) injection 4 Units  4 Units Subcutaneous At Bedtime   4 Units at 06/03/17 2126     0.9% sodium chloride infusion   Intravenous Continuous 100 mL/hr at 06/04/17 0129       Hold: Metformin and metformin containing medications on day of the procedure and for 48 hours after IV contrast given   Does not apply HOLD         insulin aspart (NovoLOG) inj (RAPID ACTING)  1-10 Units Subcutaneous TID AC   4 Units at 06/03/17 1136     insulin aspart (NovoLOG) inj (RAPID ACTING)  1-7 Units Subcutaneous At Bedtime   2 Units at 06/03/17 2242     amitriptyline (ELAVIL) tablet 25 mg  25 mg Oral At Bedtime PRN         aspirin EC EC tablet 81 mg  81 mg Oral Daily   81 mg at 06/03/17 1122     atorvastatin (LIPITOR) tablet 80 mg  80 mg Oral Daily   80 mg at 06/03/17 1122     DULoxetine (CYMBALTA) EC capsule 30 mg  30 mg Oral Daily   30 mg at 06/03/17 1122     glipiZIDE (GLUCOTROL) tablet 10 mg  10 mg Oral BID AC   10 mg at 06/03/17 1634     HYDROcodone-acetaminophen (NORCO) 5-325 MG per tablet 1-2 tablet  1-2 tablet Oral Q4H PRN   2 tablet at 06/03/17 1230     ipratropium (ATROVENT) 0.03 % spray 2 spray  2 spray Both Nostrils Q12H   2 spray at 06/03/17 2125     metoprolol (TOPROL-XL) 24 hr tablet 50 mg  50 mg Oral Daily   50 mg at 06/04/17 0821     omeprazole (priLOSEC) CR capsule 40 mg  40 mg Oral Daily   40 mg at 06/03/17 1122     psyllium (METAMUCIL/KONSYL) Packet 1 packet  1 packet Oral Daily   1 packet at 06/03/17 1121     tamsulosin (FLOMAX) capsule 0.4 mg  0.4 mg Oral Daily   0.4 mg at 06/03/17 1122     naloxone (NARCAN) injection 0.1-0.4 mg  0.1-0.4 mg Intravenous Q2  Min PRN         lidocaine 1 % 1 mL  1 mL Other Q1H PRN         lidocaine (LMX4) cream   Topical Q1H PRN         sodium chloride (PF) 0.9% PF flush 3 mL  3 mL Intracatheter Q1H PRN         sodium chloride (PF) 0.9% PF flush 3 mL  3 mL Intracatheter Q8H         acetaminophen (TYLENOL) tablet 650 mg  650 mg Oral Q4H PRN   650 mg at 06/01/17 0530     ondansetron (ZOFRAN-ODT) ODT tab 4 mg  4 mg Oral Q6H PRN        Or     ondansetron (ZOFRAN) injection 4 mg  4 mg Intravenous Q6H PRN         piperacillin-tazobactam (ZOSYN) 3.375 g vial to attach to  mL bag  3.375 g Intravenous Q6H   3.375 g at 06/04/17 0336     glucose 40 % gel 15-30 g  15-30 g Oral Q15 Min PRN        Or     dextrose 50 % injection 25-50 mL  25-50 mL Intravenous Q15 Min PRN        Or     glucagon injection 1 mg  1 mg Subcutaneous Q15 Min PRN         No current [facility]-ordered outpatient prescriptions on file.     Wt Readings from Last 1 Encounters:   06/04/17 75.1 kg (165 lb 9.1 oz)     Temp Readings from Last 1 Encounters:   06/04/17 37.2  C (98.9  F) (Oral)     BP Readings from Last 6 Encounters:   06/04/17 178/66   04/21/17 131/53   03/07/17 126/82   02/24/17 102/60   01/27/17 138/59   01/21/17 143/65     Pulse Readings from Last 4 Encounters:   06/04/17 73   04/21/17 59   03/07/17 50   02/24/17 90     Resp Readings from Last 1 Encounters:   06/04/17 16     SpO2 Readings from Last 1 Encounters:   06/04/17 93%     Recent Labs   Lab Test  06/04/17   0830  06/03/17   0655  06/02/17   0707  06/01/17   0656  05/31/17   1800   NA   --    --    --   141  139   POTASSIUM  4.1   --    --   4.6  4.4   CHLORIDE   --    --    --   105  103   CO2   --    --    --   33*  30   ANIONGAP   --    --    --   3  6   GLC   --    --    --   164*  249*   BUN   --    --    --   12  15   CR   --   0.68  0.66  0.64*  0.62*   ALLISON   --    --    --   8.7  8.8     Recent Labs   Lab Test  06/01/17   0656  05/31/17   1800   WBC  8.0  9.5   HGB  11.5*  12.4*   PLT  158  192      Recent Labs   Lab Test  01/20/17   1030  08/19/09   2100   INR  1.03  0.99      RECENT LABS:     ECG: Sinus tachy, RBBB, PACs (2/2017)    ECHO: 2017  Interpretation Summary     The rhythm was atrial fibrillation with controlled ventricular rate at rest  with an IVCD also.  The left ventricle is normal in size. Left ventricular systolic function is  moderately reduced as the visual ejection fraction is estimated at 35-40%.  The right ventricular systolic function is normal.  The aortic Sinus(es) of Valsalva are mildly dilated at 4.1 cm. with a normal  size ascending aorta.  There is trace mitral regurgitation.  There is mild trileaflet aortic sclerosis.  There is sclerosis, calcification, and restriction of the aortic valve opening  compatible with trivial aortic stenosis.  The left atrium is mildly dilated by volume criteria at 39 ml/m2.    Nuc Stress:  2017  Impression  1.  Myocardial perfusion imaging using single isotope technique  demonstrated a medium-sized perfusion defect of severe intensity  involving the mid to basal inferior and inferoseptal walls as well as  the basal inferolateral wall which is mildly reversible and consistent  with a prior myocardial infarction in the RCA/circumflex distribution  with mild ezekiel-infarct ischemia.   2. Gated images demonstrated akinesis of the mid to basal inferior and  inferolateral walls.  The left ventricular systolic function is 37% at  rest and 28% on the post stress images.  3. There is no previous study for comparison.    Anesthesia Evaluation     . Pt has had prior anesthetic. Type: General    History of anesthetic complications   - PONV        ROS/MED HX    ENT/Pulmonary:     (+)mild COPD (rare inhaler use; uncommon nighttime oxygen use), , . .   (-) sleep apnea and recent URI   Neurologic: Comment: Mild cognitive impairment    (+)CVA (1999) without deficits   (-) seizures and migraines   Cardiovascular: Comment: Bilateral carotid stenosis, left chronically  occluded     AAA, 3.2 cm by CT scan    S/p Left Common Iliac Stent this admit (6/2/2017)    (+) Dyslipidemia, hypertension-Peripheral Vascular Disease-- Carotid Stenosis and Other, CAD (medical management), -past MI (1970's),-. Taking blood thinners : . CHF etiology: Ischemic Cardiomyopathy - RCA distribution infarct Last EF: ~30-35% date: 2017 . . :. dysrhythmias (Paroxysmal) a-fib, .      (-) orthopnea/PND and syncope   METS/Exercise Tolerance:  >4 METS   Hematologic:         Musculoskeletal: Comment: osteopenia  (+) arthritis, , , -       GI/Hepatic: Comment: Admitted 12/23/2016 with abdominal pain    Acute pancreatic cancer    Pancreatic cancer    (+) GERD Asymptomatic on medication,      (-) liver disease   Renal/Genitourinary:     (+) chronic renal disease, type: CRI, Nephrolithiasis , BPH,       Endo:     (+) type II DM Not using insulin .   (-) thyroid disease   Psychiatric:     (+) psychiatric history depression      Infectious Disease:         Malignancy:         Other:                     Physical Exam      Airway   Mallampati: II  TM distance: >3 FB  Neck ROM: full    Dental   (+) caps    Cardiovascular   Rhythm and rate: irregular and normal  (-) no murmur    Pulmonary    breath sounds clear to auscultation(-) no wheezes                        Anesthesia Plan      History & Physical Review  History and physical reviewed and following examination; no interval change.    ASA Status:  4 .    NPO Status:  > 8 hours    Plan for MAC   PONV prophylaxis:  Ondansetron (or other 5HT-3)  Continue blood glucose monitoring/treatment perioperatively  Light sedation/slow titration  Limit IVF intake       Postoperative Care  Postoperative pain management:  IV analgesics and Multi-modal analgesia.      Consents  Anesthetic plan, risks, benefits and alternatives discussed with:  Patient, Spouse and Daughter/Son..

## 2017-06-05 ENCOUNTER — APPOINTMENT (OUTPATIENT)
Dept: GENERAL RADIOLOGY | Facility: CLINIC | Age: 82
DRG: 617 | End: 2017-06-05
Attending: PODIATRIST
Payer: MEDICARE

## 2017-06-05 ENCOUNTER — APPOINTMENT (OUTPATIENT)
Dept: PHYSICAL THERAPY | Facility: CLINIC | Age: 82
DRG: 617 | End: 2017-06-05
Attending: PODIATRIST
Payer: MEDICARE

## 2017-06-05 LAB
GLUCOSE BLDC GLUCOMTR-MCNC: 111 MG/DL (ref 70–99)
GLUCOSE BLDC GLUCOMTR-MCNC: 120 MG/DL (ref 70–99)
GLUCOSE BLDC GLUCOMTR-MCNC: 141 MG/DL (ref 70–99)
GLUCOSE BLDC GLUCOMTR-MCNC: 200 MG/DL (ref 70–99)
GLUCOSE BLDC GLUCOMTR-MCNC: 80 MG/DL (ref 70–99)

## 2017-06-05 PROCEDURE — 00000146 ZZHCL STATISTIC GLUCOSE BY METER IP

## 2017-06-05 PROCEDURE — A9270 NON-COVERED ITEM OR SERVICE: HCPCS | Mod: GY | Performed by: INTERNAL MEDICINE

## 2017-06-05 PROCEDURE — 40000193 ZZH STATISTIC PT WARD VISIT: Performed by: PHYSICAL THERAPIST

## 2017-06-05 PROCEDURE — 99207 ZZC CDG-MDM COMPONENT: MEETS LOW - DOWN CODED: CPT | Performed by: INTERNAL MEDICINE

## 2017-06-05 PROCEDURE — 25000132 ZZH RX MED GY IP 250 OP 250 PS 637: Mod: GY | Performed by: INTERNAL MEDICINE

## 2017-06-05 PROCEDURE — 25000131 ZZH RX MED GY IP 250 OP 636 PS 637: Mod: GY | Performed by: INTERNAL MEDICINE

## 2017-06-05 PROCEDURE — 12000000 ZZH R&B MED SURG/OB

## 2017-06-05 PROCEDURE — 97530 THERAPEUTIC ACTIVITIES: CPT | Mod: GP | Performed by: PHYSICAL THERAPIST

## 2017-06-05 PROCEDURE — 99232 SBSQ HOSP IP/OBS MODERATE 35: CPT | Performed by: INTERNAL MEDICINE

## 2017-06-05 PROCEDURE — 25000128 H RX IP 250 OP 636: Performed by: INTERNAL MEDICINE

## 2017-06-05 PROCEDURE — 99231 SBSQ HOSP IP/OBS SF/LOW 25: CPT | Performed by: SURGERY

## 2017-06-05 PROCEDURE — 97161 PT EVAL LOW COMPLEX 20 MIN: CPT | Mod: GP | Performed by: PHYSICAL THERAPIST

## 2017-06-05 PROCEDURE — 97116 GAIT TRAINING THERAPY: CPT | Mod: GP | Performed by: PHYSICAL THERAPIST

## 2017-06-05 PROCEDURE — 40000986 XR FOOT LT G/E 3 VW: Mod: LT

## 2017-06-05 RX ADMIN — TAMSULOSIN HYDROCHLORIDE 0.4 MG: 0.4 CAPSULE ORAL at 09:21

## 2017-06-05 RX ADMIN — METOPROLOL SUCCINATE 50 MG: 50 TABLET, EXTENDED RELEASE ORAL at 09:21

## 2017-06-05 RX ADMIN — ACETAMINOPHEN 650 MG: 325 TABLET, FILM COATED ORAL at 00:18

## 2017-06-05 RX ADMIN — ASPIRIN 81 MG: 81 TABLET, COATED ORAL at 09:21

## 2017-06-05 RX ADMIN — METFORMIN HYDROCHLORIDE 1000 MG: 500 TABLET, FILM COATED ORAL at 13:04

## 2017-06-05 RX ADMIN — METFORMIN HYDROCHLORIDE 1000 MG: 500 TABLET, FILM COATED ORAL at 18:04

## 2017-06-05 RX ADMIN — PIPERACILLIN SODIUM,TAZOBACTAM SODIUM 3.38 G: 3; .375 INJECTION, POWDER, FOR SOLUTION INTRAVENOUS at 04:10

## 2017-06-05 RX ADMIN — ATORVASTATIN CALCIUM 80 MG: 40 TABLET, FILM COATED ORAL at 09:20

## 2017-06-05 RX ADMIN — PSYLLIUM HUSK 1 PACKET: 3.4 POWDER ORAL at 09:21

## 2017-06-05 RX ADMIN — IPRATROPIUM BROMIDE 2 SPRAY: 21 SPRAY NASAL at 21:08

## 2017-06-05 RX ADMIN — GLIPIZIDE 10 MG: 10 TABLET ORAL at 06:55

## 2017-06-05 RX ADMIN — IPRATROPIUM BROMIDE 2 SPRAY: 21 SPRAY NASAL at 09:24

## 2017-06-05 RX ADMIN — SODIUM CHLORIDE: 9 INJECTION, SOLUTION INTRAVENOUS at 11:32

## 2017-06-05 RX ADMIN — PIPERACILLIN SODIUM,TAZOBACTAM SODIUM 3.38 G: 3; .375 INJECTION, POWDER, FOR SOLUTION INTRAVENOUS at 11:33

## 2017-06-05 RX ADMIN — INSULIN GLARGINE 4 UNITS: 100 INJECTION, SOLUTION SUBCUTANEOUS at 21:52

## 2017-06-05 RX ADMIN — DULOXETINE HYDROCHLORIDE 30 MG: 30 CAPSULE, DELAYED RELEASE ORAL at 09:20

## 2017-06-05 RX ADMIN — OMEPRAZOLE 40 MG: 20 CAPSULE, DELAYED RELEASE ORAL at 09:20

## 2017-06-05 RX ADMIN — PIPERACILLIN SODIUM,TAZOBACTAM SODIUM 3.38 G: 3; .375 INJECTION, POWDER, FOR SOLUTION INTRAVENOUS at 21:52

## 2017-06-05 RX ADMIN — GLIPIZIDE 10 MG: 10 TABLET ORAL at 16:01

## 2017-06-05 RX ADMIN — PIPERACILLIN SODIUM,TAZOBACTAM SODIUM 3.38 G: 3; .375 INJECTION, POWDER, FOR SOLUTION INTRAVENOUS at 16:01

## 2017-06-05 ASSESSMENT — PAIN DESCRIPTION - DESCRIPTORS: DESCRIPTORS: SHARP

## 2017-06-05 NOTE — PROGRESS NOTES
SP[facility]ITUAL HE[facility] Progress Note  [facility] 88    Follow-up visit with pt by consult request.  Pt says that he is feeling much better after his procedure yesterday.  He speaks of the support he receives from his life partner, his son, his daughter, and friends.  He told the stories of his conversion to Catholicism, [place], and of his adult children's successes.  Pt's outlook for his recovery is positive.  [facility] provided emotional support.    [facility] remains available upon request.  No plan for follow-up at this time.                                                                                                                                           Kendra Lee M.Div.  Chaplain Resident  Pager [phone]

## 2017-06-05 NOTE — PLAN OF CARE
Problem: Goal Outcome Summary  Goal: Goal Outcome Summary  Outcome: No Change  Patient is A&OX4 w/ forgetfulness, VSS, except need for 1L O@ during NOC for sats <90%. POD 1 for debridement/resection/amputation CMS intact except for numbness on left leg, Positive posterior tibial pulses, with patient able to wiggle digits. Dressing CDI. Pt has concerns of new incontinence and is unable to call prior to urinating in depends. Pt is non weight bearing on LLE. Clear liquid Diet, ADAT. C/O pain on left leg/foot, tylenol x1. Groin stenting sites brusied, dgs CDI.  Hospitalist and ID also following. Pt requested  for emotional support, orders for consult put in. Will continue to monitor.

## 2017-06-05 NOTE — PROGRESS NOTES
06/05/17 1340   Quick Adds   Type of Visit Initial PT Evaluation   Living Environment   Lives With friend(s)  (Halina)   Living Arrangements house  ([facility])   Number of Stairs to Enter Home 3  (1 railing. )   Number of Stairs Within Home 9  (1 railing. )   Transportation Available family or friend will provide  (Pt's friend Halina provides transportation. )   Living Environment Comment Pt's friend Halina assists with cooking, laundry, and cleaning.    Self-Care   Usual Activity Tolerance good   Current Activity Tolerance moderate   Regular Exercise no   Equipment Currently Used at Home cane, straight   Activity/Exercise/Self-Care Comment Pt owns SEC.    Functional Level Prior   Ambulation 1-->assistive equipment   Transferring 1-->assistive equipment   Fall history within last six months yes   Number of times patient has fallen within last six months 1   Prior Functional Level Comment Pt reports completing functional mobility with use of SEC.    General Information   Onset of Illness/Injury or Date of Surgery - Date 05/31/17   Referring Physician Moe Kirk DPM   Patient/Family Goals Statement Return home.    Pertinent History of Current Problem (include personal factors and/or comorbidities that impact the POC) 89 y/o male presenting with L foot ulcer. Pt is now POD # 3 L MEG stent placement and L SFA angioplasty and POD # 1 partial 5th ray amputation. PMH including HTN, CAD, cardiomyopathy, DM, GERD, hyperlipidemia, atrial fibrillation, and osteopenia.    Precautions/Limitations fall precautions   Weight-Bearing Status - LLE nonweight-bearing   General Observations Pt laying in bed upon arrival of therapist.    General Info Comments Up ad mary grace.    Cognitive Status Examination   Orientation orientation to person, place and time   Level of Consciousness alert   Follows Commands and Answers Questions 75% of the time   Personal Safety and Judgment at risk behaviors demonstrated  (Pt impulsive at times with  "transfers. )   Pain Assessment   Patient Currently in Pain No   Integumentary/Edema   Integumentary/Edema Comments L foot covered with dressing.    Posture    Posture Comments Noted forward head and shoulder posture upon sitting EOB and standing at FWW.    Range of Motion (ROM)   ROM Comment B LEs WFL, limited R ankle ROM secondary to bandage.    Strength   Strength Comments Not formally assessed. Pt demonstrates at least 3/5 grossly in B LEs with movement in supine and functional mobility.    Bed Mobility   Bed Mobility Comments Supine-sit, SBA with HOB elevated.    Transfer Skills   Transfer Comments Sit <> stand with FWW and CGA.    Gait   Gait Comments Pt amb a couple steps towards bedside recliner with use of FWW and Oni. Pt demonstrated ability to maintain L NWB restriction with cues.    Balance   Balance Comments Noted good sitting and standing balance at FWW.    Sensory Examination   Sensory Perception Comments Pt denied numbness/tingling in B LEs.    General Therapy Interventions   Planned Therapy Interventions bed mobility training;gait training;ROM;strengthening;transfer training   Clinical Impression   Criteria for Skilled Therapeutic Intervention yes, treatment indicated   PT Diagnosis Difficulty with gait.    Influenced by the following impairments Pain, LE weakness, L NWB restriction, Decreased activity tolerance   Functional limitations due to impairments Limited functional mobility requiring AD and assist.    Clinical Presentation Stable/Uncomplicated   Clinical Presentation Rationale Based on PMH, current presentation, and social support.    Clinical Decision Making (Complexity) Low complexity   Therapy Frequency` daily   Predicted Duration of Therapy Intervention (days/wks) 4 days   Anticipated Discharge Disposition Transitional Care Facility   Risk & Benefits of therapy have been explained Yes   Patient, Family & other staff in agreement with plan of care Yes   [facility] AM-PAC  \"6 " "Clicks\" V.2 Basic Mobility Inpatient Short Form   1. Turning from your back to your side while in a flat bed without using bedrails? 4 - None   2. Moving from lying on your back to sitting on the side of a flat bed without using bedrails? 3 - A Little   3. Moving to and from a bed to a chair (including a wheelchair)? 3 - A Little   4. Standing up from a chair using your arms (e.g., wheelchair, or bedside chair)? 3 - A Little   5. To walk in hospital room? 2 - A Lot   6. Climbing 3-5 steps with a railing? 1 - Total   Basic Mobility Raw Score (Score out of 24.Lower scores equate to lower levels of function) 16   Total Evaluation Time   Total Evaluation Time (Minutes) 10     "

## 2017-06-05 NOTE — PROGRESS NOTES
[facility]    Internal Medicine Hospitalist Progress Note  06/05/2017  I evaluated patient on the above date.    Faustino Aguilar Jr., MD  [phone] (p)  Text Page (7 am to 6 pm)      Assessment & Plan Dustin Pearce is a 89 year old male with history including DM2, HTN, CAD, CHF, PAF, CVA, PAD, DLD, GERD and BPH, who presented 5/31 with worsening L toe/foot ulcer/wound.     1.  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  * L foot x-ray 5/31 no acute findings. Started on Zosyn 5/31.  * MRI L foot 6/1 showed signs suspicious for osteomyelitis 5th MT head and 5th toe prox phalangeal base; 5th MT joint periarticular fluid signal intensity, possibly representing developing soft tissue abscess; dorsal and deeper soft tissue edema within the forefoot.  * Seen by Podiatry and Vascular.   * Underwent vascular w/u as below and underwent L MEG stenting and L SFA angioplasty on 6/2 and subseuqently felt that there was adequate blood supply for healing.  * S/p L partial 5th ray amputation 6/4/2017.  * Micro: BC's 5/31 NGTD. Tissue cultures L foot 6/4 pending. Abcess cultures L foot 6/4 pending.  - Continue Zosyn (started 5/31).  - Monitor cultures.  - Post-op mgmt per Podiatry - plan delayed closure in a few days, apprec help.  - ID following, apprec help.     2. Peripheral arterial disease with nonhealing, infected wound - s/p L MEG stent and L SFA angioplasty on 6/2.  * H/o carotid disease.  * Seen by Vascular this stay.  * KRYSTINA 6/1 suggested significant arterial insufficiency.   * Carotid US 6/2 showed 50-79& in R and occlusion of the L. LE angiogram 6/2 showed findings including significant stenosis found in left common iliac artery which was stented with a 10mm stent; L SFA stenosis noted and was angioplastied w 4 mm balloon.  - Continue ASA.  - F/u with Vascular 3 months.     3. DM II with  peripheral neuropathy - uncontrolled.  [PTA: glipizide 10 mg BID, metformin 1000 mg BID.]  * Hgb A1C 8.3 4/2017.  * Lantus started 6/3.   Recent Labs   Lab Test  06/05/17   0154  06/04/17   2232  06/04/17   1808  06/04/17   1622  06/04/17   1158   06/01/17   0656   05/31/17   1800   01/20/17   1030  01/16/17   1033   12/27/16   0650   GLC   --    --    --    --    --    --   164*   --   249*   --   193*  337*   --   203*   BGM  111*  119*  174*  163*  135*   < >   --    < >   --    < >   --    --    < >   --     < > = values in this interval not displayed.   - Continue glipizide 10 mg BID.  - Continue Lantus 4U qHS.  - Restart metformin 1000 mg BID.  - Continue ISS.  - After discharge, f/u with PCP regarding adjusting oral regimen; alternatively, consider d/c home with Lantus.    4. CAD, CHF.  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 Echo 1/2017 showed LVEF 35-40%. Nuc stress 3/2017 showed findings consistent with prior MI in the RCA/cirumflex distribution with mild ezekiel-infarct ischemia; EF 37% at rest and 28% post stress.  - Hold Plavix until surgeries done.  - Continue ASA, atorvastatin, metoprolol.  - Hold lisinopril for now.  - Monitor i/o's, daily wts.    5. Hypertension (benign essential).  [PTA: lisinopril 2.5 mg daily, metoprolol XL 50 mg daily.]  - Continue metoprolol XL 50 mg daily.  - Hold lisinopril for now.    6. Cerebrovascular disease.  * H/o CVI.  - Continue ASA.  - Should be on AC, but defer further discussions outpt.     7. Afib  - Continue ASA, metoprolol.  - Should be on AC, but defer further discussions outpt.     8. Depression.  - Continue amitryptiline and duloxetine.      9. GERD.  - Continue omeprazole.     10. BPH.  - Continue Flomax.      Prophylaxis.  - PCD's.    CODE STATUS: FULL    Dispo.  - Pending above.  - 3-4 more days.    Interval History   Doing OK. Denies pain. Eating OK. Denies CP or  "SOB.      -Data reviewed today: I reviewed all new labs and imaging over the last 24 hours. I personally reviewed no images or EKG's today.    Physical Exam   Heart Rate: 63, Blood pressure 154/59, pulse 73, temperature 97.6  F (36.4  C), temperature source Oral, resp. rate 16, height 1.651 m (5' 5\"), weight 77.1 kg (169 lb 15.6 oz), SpO2 98 %.  Vitals:    06/03/17 0653 06/04/17 0500 06/05/17 0713   Weight: 75.8 kg (167 lb 1.7 oz) 75.1 kg (165 lb 9.1 oz) 77.1 kg (169 lb 15.6 oz)     Vital Signs with Ranges  Temp:  [97.1  F (36.2  C)-98.6  F (37  C)] 97.6  F (36.4  C)  Heart Rate:  [57-73] 63  Resp:  [14-16] 16  BP: (120-169)/(46-69) 154/59  SpO2:  [94 %-100 %] 98 %  Patient Vitals for the past 24 hrs:   BP Temp Temp src Heart Rate Resp SpO2 Weight   06/05/17 0735 154/59 97.6  F (36.4  C) Oral 63 16 98 % -   06/05/17 0713 - - - - - - 77.1 kg (169 lb 15.6 oz)   06/05/17 0010 141/63 98.2  F (36.8  C) Oral 71 - 95 % -   06/04/17 1951 137/46 97.1  F (36.2  C) Oral 68 16 96 % -   06/04/17 1630 135/48 98.5  F (36.9  C) Axillary 66 16 94 % -   06/04/17 1536 156/56 - - 73 16 100 % -   06/04/17 1438 167/62 - - 62 16 98 % -   06/04/17 1408 169/66 - - 58 16 97 % -   06/04/17 1338 167/64 - - 60 16 98 % -   06/04/17 1335 162/69 - - 61 16 99 % -   06/04/17 1310 155/63 - - 59 16 99 % -   06/04/17 1305 - - - - - 95 % -   06/04/17 1255 150/61 - - 60 16 95 % -   06/04/17 1239 149/57 - - 57 16 95 % -   06/04/17 1223 158/58 97.6  F (36.4  C) Oral 59 16 97 % -   06/04/17 1159 - 98.6  F (37  C) - 57 15 100 % -   06/04/17 1150 141/60 98.4  F (36.9  C) - 57 15 - -   06/04/17 1140 133/56 98.4  F (36.9  C) - 57 15 98 % -   06/04/17 1130 133/55 98.4  F (36.9  C) - 57 16 98 % -   06/04/17 1120 120/52 98.1  F (36.7  C) - 59 14 99 % -     I/O's Last 24 hours  I/O last 3 completed shifts:  In: 1128 [P.O.:240; I.V.:888]  Out: 300 [Urine:250; Blood:50]    Constitutional: Alert, oriented, pleasant.  Respiratory: Clear, no " crackles/wheezing.  Cardiovascular: Fairly regular, no m/r/g.  GI: Soft, nt, nd, +BS.  Skin/Integumen: L foot heavily wrapped; no leg edema.  Other:        Data     Recent Labs  Lab 06/04/17  0830 06/03/17  0655 06/02/17  0707 06/01/17  0656 05/31/17  1800   WBC  --   --   --  8.0 9.5   HGB  --   --   --  11.5* 12.4*   MCV  --   --   --  95 96   PLT  --   --   --  158 192   NA  --   --   --  141 139   POTASSIUM 4.1  --   --  4.6 4.4   CHLORIDE  --   --   --  105 103   CO2  --   --   --  33* 30   BUN  --   --   --  12 15   CR  --  0.68 0.66 0.64* 0.62*   ANIONGAP  --   --   --  3 6   ALLISON  --   --   --  8.7 8.8   GLC  --   --   --  164* 249*     Recent Labs   Lab Test  06/05/17   0154  06/04/17   2232  06/04/17   1808  06/04/17   1622  06/04/17   1158   06/01/17   0656   05/31/17   1800   01/20/17   1030  01/16/17   1033   12/27/16   0650   GLC   --    --    --    --    --    --   164*   --   249*   --   193*  337*   --   203*   BGM  111*  119*  174*  163*  135*   < >   --    < >   --    < >   --    --    < >   --     < > = values in this interval not displayed.         No results found for this or any previous visit (from the past 24 hour(s)).    Medications   All medications were reviewed.    NaCl 100 mL/hr at 06/05/17 0400       insulin glargine  4 Units Subcutaneous At Bedtime     insulin aspart  1-10 Units Subcutaneous TID AC     insulin aspart  1-7 Units Subcutaneous At Bedtime     aspirin  81 mg Oral Daily     atorvastatin (LIPITOR) tablet 80 mg  80 mg Oral Daily     DULoxetine  30 mg Oral Daily     glipiZIDE  10 mg Oral BID AC     ipratropium  2 spray Both Nostrils Q12H     metoprolol  50 mg Oral Daily     omeprazole  40 mg Oral Daily     psyllium  1 packet Oral Daily     tamsulosin  0.4 mg Oral Daily     sodium chloride (PF)  3 mL Intracatheter Q8H     piperacillin-tazobactam  3.375 g Intravenous Q6H

## 2017-06-05 NOTE — PROGRESS NOTES
"Podiatry / Foot and Ankle Surgery Progress Note    June 5, 2017    Subject: Patient was seen at bedside.  Notes no pain to foot today. Denies fever, chills, nausa.     Objective:  Vitals: /50 (BP Location: Left arm)  Pulse 73  Temp 97.6  F (36.4  C) (Oral)  Resp 16  Ht 1.651 m (5' 5\")  Wt 77.1 kg (169 lb 15.6 oz)  SpO2 96%  BMI 28.29 kg/m2  BMI= Body mass index is 28.29 kg/(m^2).     A1C; 8.3      General:  Patient is alert and orientated.  NAD  Dressing is c/d/i. Bloody strikethrough to Kerlix roll. Incision is loosely approximated with 1-2 sutures. Redness has resolved. CFT's < 3 secs.     Cultures:  Bone cultures currently negative.   Wound culture: staph aureus    Pathology: pending    Assessment: 89 yr old diabetic male s/p left partial 5th ray amputation due to osteomyelitis.    Plan:    -POD#1   -Dressing was changed.   -redness has resolved.  -Patient scheduled to go back to OR tomorrow night with Dr. Ann for repeat I&D and flap closure.  -Keep foot and dressing dry.  -continue non weight bearing.   -NPO after 9am tomorrow. Orders placed.     Nicole Copeland DPM, Podiatry/Foot and Ankle Surgery  3:52 PM    "

## 2017-06-05 NOTE — PROGRESS NOTES
[facility]    Infectious Disease Progress Note    Date of Service (when I saw the patient): 06/05/2017     Assessment & Plan   Dustin Pearce is a 89 year old male who was admitted on 5/31/2017.     Impression:   89 y.o male with DM, peripheral neuropathy.   Admitted with worsening in the non healing left foot ulcer.   MRI concerning for osteo.   S/P partial amputation.   OR cultures gram stain positive for GPC, cultures pending.       Recommendations:   Continue on Zosyn   Will continue to follow surgical plan, cultures.     Anastasia Cee MD    Interval History   Afebrile, negative blood cultures   No new complaints   S/p partial ray amputation    Physical Exam   Temp: 97.6  F (36.4  C) Temp src: Oral BP: 154/59   Heart Rate: 63 Resp: 16 SpO2: 98 % O2 Device: Nasal cannula Oxygen Delivery: 1 LPM  Vitals:    06/03/17 0653 06/04/17 0500 06/05/17 0713   Weight: 75.8 kg (167 lb 1.7 oz) 75.1 kg (165 lb 9.1 oz) 77.1 kg (169 lb 15.6 oz)     Vital Signs with Ranges  Temp:  [97.1  F (36.2  C)-98.6  F (37  C)] 97.6  F (36.4  C)  Heart Rate:  [57-73] 63  Resp:  [14-16] 16  BP: (120-169)/(46-69) 154/59  SpO2:  [94 %-100 %] 98 %    Constitutional: Awake, alert, cooperative, no apparent distress  Lungs: Clear to auscultation bilaterally, no crackles or wheezing  Cardiovascular: Regular rate and rhythm, normal S1 and S2, and no murmur noted  Abdomen: Normal bowel sounds, soft, non-distended, non-tender  Skin: No rashes, no cyanosis, no edema  Other:    Medications     NaCl 100 mL/hr at 06/05/17 0400       insulin glargine  4 Units Subcutaneous At Bedtime     insulin aspart  1-10 Units Subcutaneous TID AC     insulin aspart  1-7 Units Subcutaneous At Bedtime     aspirin  81 mg Oral Daily     atorvastatin (LIPITOR) tablet 80 mg  80 mg Oral Daily     DULoxetine  30 mg Oral Daily     glipiZIDE  10 mg Oral BID AC     ipratropium  2 spray Both Nostrils Q12H     metoprolol  50 mg Oral Daily     omeprazole  40 mg Oral  Daily     psyllium  1 packet Oral Daily     tamsulosin  0.4 mg Oral Daily     sodium chloride (PF)  3 mL Intracatheter Q8H     piperacillin-tazobactam  3.375 g Intravenous Q6H       Data   All microbiology laboratory data reviewed.  Recent Labs   Lab Test  06/01/17   0656  05/31/17   1800  03/07/17   2142   WBC  8.0  9.5  11.6*   HGB  11.5*  12.4*  14.0   HCT  35.9*  38.5*  44.1   MCV  95  96  97   PLT  158  192  198     Recent Labs   Lab Test  06/03/17   0655  06/02/17   0707  06/01/17   0656   CR  0.68  0.66  0.64*     Recent Labs   Lab Test  05/31/17   1800   SED  43*     Recent Labs   Lab Test  06/04/17   1027  05/31/17   1930  05/31/17   1905   CULT  Pending  Pending  No growth after 5 days  No growth after 5 days

## 2017-06-05 NOTE — PLAN OF CARE
Problem: Goal Outcome Summary  Goal: Goal Outcome Summary  PT: Orders received, evaluation completed, and treatment initiated. Patient is a 89 y/o male presenting with L foot ulcer, now POD # 3 L MEG stent placement and L SFA angioplasty and POD # 1 partial 5th ray amputation. Patient lives with friend Halina in a Townhouse with 3 stairs to enter/exit and 9 stairs to access bedroom. Patient completed functional mobility independently with use of a cane at baseline.      Discharge Planner PT   Patient plan for discharge: Home. Patient open to discussion of TCU at time of discharge.      Current status: Patient performed supine-sit, SBA. Sit <> stand and ambulation of a couple steps with walker and Oni, cues provided to maintain L NWB restriction. Patient agreeable to sit up in chair at end of session with L LE elevated.      Barriers to return to prior living situation: Stairs to enter/exit and navigate stairs      Recommendations for discharge: TCU     Rationale for recommendations: Currently requiring assist for all mobility, L LE NWB restriction       Entered by: Marian Corrigan 06/05/2017 2:34 PM

## 2017-06-05 NOTE — PROGRESS NOTES
Vascular Surgery Progress Note    S:No complaints.  Amputation went well yesterday  Noted goog bleeding to tissue    O:   Vitals:  BP  Min: 120/52  Max: 178/66  Temp  Av.2  F (36.8  C)  Min: 97.1  F (36.2  C)  Max: 98.9  F (37.2  C)  Pulse  Av  Min: 73  Max: 73  I/O last 3 completed shifts:  In: 1128 [P.O.:240; I.V.:888]  Out: 300 [Urine:250; Blood:50]    Physical Exam: Comfortable   Dressings intact to left foot.      Assessment/Plan: Appears to have adequate blood supply for healing.                                  We will plan follow-up vascular exams in three months.      Wm. Jennifer MD

## 2017-06-05 NOTE — PLAN OF CARE
Problem: Goal Outcome Summary  Goal: Goal Outcome Summary  Outcome: No Change  Pt. A&Ox4, forgetful at times. Assist 1 with GB. VSS on 1 L NC. Denies pain. L foot dressing CDI. CMS intact. Full liquid diet, tolerating well. BS active. Incontinent of bladder. Insulin coverage administered, started on metformin. Groin stenting sites CDI, bruising on L side. Non wt. Bearing on L foot. Nurse will continue to monitor.

## 2017-06-06 ENCOUNTER — ANESTHESIA (OUTPATIENT)
Dept: SURGERY | Facility: CLINIC | Age: 82
DRG: 617 | End: 2017-06-06
Payer: MEDICARE

## 2017-06-06 ENCOUNTER — ANESTHESIA EVENT (OUTPATIENT)
Dept: SURGERY | Facility: CLINIC | Age: 82
DRG: 617 | End: 2017-06-06
Payer: MEDICARE

## 2017-06-06 LAB
BACTERIA SPEC CULT: ABNORMAL
BACTERIA SPEC CULT: NO GROWTH
BACTERIA SPEC CULT: NO GROWTH
GLUCOSE BLDC GLUCOMTR-MCNC: 109 MG/DL (ref 70–99)
GLUCOSE BLDC GLUCOMTR-MCNC: 114 MG/DL (ref 70–99)
GLUCOSE BLDC GLUCOMTR-MCNC: 134 MG/DL (ref 70–99)
GLUCOSE BLDC GLUCOMTR-MCNC: 139 MG/DL (ref 70–99)
GLUCOSE BLDC GLUCOMTR-MCNC: 184 MG/DL (ref 70–99)
GLUCOSE SERPL-MCNC: 140 MG/DL (ref 70–99)
Lab: ABNORMAL
Lab: NORMAL
Lab: NORMAL
MICRO REPORT STATUS: ABNORMAL
MICRO REPORT STATUS: NORMAL
MICRO REPORT STATUS: NORMAL
MICROORGANISM SPEC CULT: ABNORMAL
POTASSIUM SERPL-SCNC: 3.6 MMOL/L (ref 3.4–5.3)
SPECIMEN SOURCE: ABNORMAL
SPECIMEN SOURCE: NORMAL
SPECIMEN SOURCE: NORMAL

## 2017-06-06 PROCEDURE — 25000132 ZZH RX MED GY IP 250 OP 250 PS 637: Mod: GY | Performed by: INTERNAL MEDICINE

## 2017-06-06 PROCEDURE — 87205 SMEAR GRAM STAIN: CPT | Performed by: PODIATRIST

## 2017-06-06 PROCEDURE — 27211289 ZZ H KIT CATH IV 4FR 8 CM OR 10 CM, POWERWAND QUICK XL

## 2017-06-06 PROCEDURE — 25000131 ZZH RX MED GY IP 250 OP 636 PS 637: Mod: GY | Performed by: INTERNAL MEDICINE

## 2017-06-06 PROCEDURE — 11043 DBRDMT MUSC&/FSCA 1ST 20/<: CPT | Mod: 58 | Performed by: PODIATRIST

## 2017-06-06 PROCEDURE — 36000069 ZZH SURGERY LEVEL 5 EA 15 ADDTL MIN: Performed by: PODIATRIST

## 2017-06-06 PROCEDURE — 25000125 ZZHC RX 250: Performed by: NURSE ANESTHETIST, CERTIFIED REGISTERED

## 2017-06-06 PROCEDURE — 93005 ELECTROCARDIOGRAM TRACING: CPT

## 2017-06-06 PROCEDURE — 82947 ASSAY GLUCOSE BLOOD QUANT: CPT | Performed by: ANESTHESIOLOGY

## 2017-06-06 PROCEDURE — 25000125 ZZHC RX 250: Performed by: INTERNAL MEDICINE

## 2017-06-06 PROCEDURE — 25000125 ZZHC RX 250: Performed by: PODIATRIST

## 2017-06-06 PROCEDURE — 12000007 ZZH R&B INTERMEDIATE

## 2017-06-06 PROCEDURE — 84132 ASSAY OF SERUM POTASSIUM: CPT | Performed by: ANESTHESIOLOGY

## 2017-06-06 PROCEDURE — 40000170 ZZH STATISTIC PRE-PROCEDURE ASSESSMENT II: Performed by: PODIATRIST

## 2017-06-06 PROCEDURE — 36415 COLL VENOUS BLD VENIPUNCTURE: CPT | Performed by: ANESTHESIOLOGY

## 2017-06-06 PROCEDURE — 99232 SBSQ HOSP IP/OBS MODERATE 35: CPT | Performed by: INTERNAL MEDICINE

## 2017-06-06 PROCEDURE — 71000012 ZZH RECOVERY PHASE 1 LEVEL 1 FIRST HR: Performed by: PODIATRIST

## 2017-06-06 PROCEDURE — 0JBR0ZZ EXCISION OF LEFT FOOT SUBCUTANEOUS TISSUE AND FASCIA, OPEN APPROACH: ICD-10-PCS | Performed by: PODIATRIST

## 2017-06-06 PROCEDURE — 25000128 H RX IP 250 OP 636: Performed by: PODIATRIST

## 2017-06-06 PROCEDURE — 93010 ELECTROCARDIOGRAM REPORT: CPT | Performed by: INTERNAL MEDICINE

## 2017-06-06 PROCEDURE — 25000128 H RX IP 250 OP 636: Performed by: INTERNAL MEDICINE

## 2017-06-06 PROCEDURE — 37000009 ZZH ANESTHESIA TECHNICAL FEE, EACH ADDTL 15 MIN: Performed by: PODIATRIST

## 2017-06-06 PROCEDURE — 25000128 H RX IP 250 OP 636: Performed by: ANESTHESIOLOGY

## 2017-06-06 PROCEDURE — 36569 INSJ PICC 5 YR+ W/O IMAGING: CPT

## 2017-06-06 PROCEDURE — 36000067 ZZH SURGERY LEVEL 5 1ST 30 MIN: Performed by: PODIATRIST

## 2017-06-06 PROCEDURE — 87070 CULTURE OTHR SPECIMN AEROBIC: CPT | Performed by: PODIATRIST

## 2017-06-06 PROCEDURE — A9270 NON-COVERED ITEM OR SERVICE: HCPCS | Mod: GY | Performed by: INTERNAL MEDICINE

## 2017-06-06 PROCEDURE — 27210794 ZZH OR GENERAL SUPPLY STERILE: Performed by: PODIATRIST

## 2017-06-06 PROCEDURE — 37000008 ZZH ANESTHESIA TECHNICAL FEE, 1ST 30 MIN: Performed by: PODIATRIST

## 2017-06-06 PROCEDURE — 00000146 ZZHCL STATISTIC GLUCOSE BY METER IP

## 2017-06-06 PROCEDURE — 25800025 ZZH RX 258: Performed by: PODIATRIST

## 2017-06-06 PROCEDURE — S0020 INJECTION, BUPIVICAINE HYDRO: HCPCS | Performed by: PODIATRIST

## 2017-06-06 PROCEDURE — 87075 CULTR BACTERIA EXCEPT BLOOD: CPT | Performed by: PODIATRIST

## 2017-06-06 RX ORDER — LIDOCAINE 40 MG/G
CREAM TOPICAL
Status: DISCONTINUED | OUTPATIENT
Start: 2017-06-06 | End: 2017-06-07

## 2017-06-06 RX ORDER — HYDROMORPHONE HYDROCHLORIDE 1 MG/ML
.3-.5 INJECTION, SOLUTION INTRAMUSCULAR; INTRAVENOUS; SUBCUTANEOUS EVERY 5 MIN PRN
Status: DISCONTINUED | OUTPATIENT
Start: 2017-06-06 | End: 2017-06-06 | Stop reason: HOSPADM

## 2017-06-06 RX ORDER — BUPIVACAINE HYDROCHLORIDE 5 MG/ML
INJECTION, SOLUTION EPIDURAL; INTRACAUDAL PRN
Status: DISCONTINUED | OUTPATIENT
Start: 2017-06-06 | End: 2017-06-06 | Stop reason: HOSPADM

## 2017-06-06 RX ORDER — MEPERIDINE HYDROCHLORIDE 25 MG/ML
12.5 INJECTION INTRAMUSCULAR; INTRAVENOUS; SUBCUTANEOUS EVERY 5 MIN PRN
Status: DISCONTINUED | OUTPATIENT
Start: 2017-06-06 | End: 2017-06-06 | Stop reason: HOSPADM

## 2017-06-06 RX ORDER — FENTANYL CITRATE 50 UG/ML
25-50 INJECTION, SOLUTION INTRAMUSCULAR; INTRAVENOUS
Status: DISCONTINUED | OUTPATIENT
Start: 2017-06-06 | End: 2017-06-06 | Stop reason: HOSPADM

## 2017-06-06 RX ORDER — FENTANYL CITRATE 50 UG/ML
INJECTION, SOLUTION INTRAMUSCULAR; INTRAVENOUS PRN
Status: DISCONTINUED | OUTPATIENT
Start: 2017-06-06 | End: 2017-06-06

## 2017-06-06 RX ORDER — SODIUM CHLORIDE, SODIUM LACTATE, POTASSIUM CHLORIDE, CALCIUM CHLORIDE 600; 310; 30; 20 MG/100ML; MG/100ML; MG/100ML; MG/100ML
INJECTION, SOLUTION INTRAVENOUS CONTINUOUS
Status: DISCONTINUED | OUTPATIENT
Start: 2017-06-06 | End: 2017-06-06 | Stop reason: HOSPADM

## 2017-06-06 RX ORDER — ALBUTEROL SULFATE 0.83 MG/ML
2.5 SOLUTION RESPIRATORY (INHALATION) EVERY 4 HOURS PRN
Status: DISCONTINUED | OUTPATIENT
Start: 2017-06-06 | End: 2017-06-06 | Stop reason: HOSPADM

## 2017-06-06 RX ORDER — ONDANSETRON 4 MG/1
4 TABLET, ORALLY DISINTEGRATING ORAL EVERY 30 MIN PRN
Status: DISCONTINUED | OUTPATIENT
Start: 2017-06-06 | End: 2017-06-06 | Stop reason: HOSPADM

## 2017-06-06 RX ORDER — ONDANSETRON 2 MG/ML
4 INJECTION INTRAMUSCULAR; INTRAVENOUS EVERY 30 MIN PRN
Status: DISCONTINUED | OUTPATIENT
Start: 2017-06-06 | End: 2017-06-06 | Stop reason: HOSPADM

## 2017-06-06 RX ADMIN — TAMSULOSIN HYDROCHLORIDE 0.4 MG: 0.4 CAPSULE ORAL at 08:43

## 2017-06-06 RX ADMIN — Medication 4 MCG: at 18:29

## 2017-06-06 RX ADMIN — OMEPRAZOLE 40 MG: 20 CAPSULE, DELAYED RELEASE ORAL at 08:43

## 2017-06-06 RX ADMIN — ASPIRIN 81 MG: 81 TABLET, COATED ORAL at 21:25

## 2017-06-06 RX ADMIN — INSULIN GLARGINE 4 UNITS: 100 INJECTION, SOLUTION SUBCUTANEOUS at 21:40

## 2017-06-06 RX ADMIN — Medication 4 MCG: at 18:41

## 2017-06-06 RX ADMIN — GLIPIZIDE 10 MG: 10 TABLET ORAL at 16:04

## 2017-06-06 RX ADMIN — METFORMIN HYDROCHLORIDE 1000 MG: 500 TABLET, FILM COATED ORAL at 08:43

## 2017-06-06 RX ADMIN — GLIPIZIDE 10 MG: 10 TABLET ORAL at 06:57

## 2017-06-06 RX ADMIN — PIPERACILLIN SODIUM,TAZOBACTAM SODIUM 3.38 G: 3; .375 INJECTION, POWDER, FOR SOLUTION INTRAVENOUS at 21:26

## 2017-06-06 RX ADMIN — FENTANYL CITRATE 50 MCG: 50 INJECTION, SOLUTION INTRAMUSCULAR; INTRAVENOUS at 18:31

## 2017-06-06 RX ADMIN — ONDANSETRON 4 MG: 2 INJECTION INTRAMUSCULAR; INTRAVENOUS at 18:31

## 2017-06-06 RX ADMIN — METOPROLOL SUCCINATE 50 MG: 50 TABLET, EXTENDED RELEASE ORAL at 08:43

## 2017-06-06 RX ADMIN — ATORVASTATIN CALCIUM 80 MG: 40 TABLET, FILM COATED ORAL at 08:43

## 2017-06-06 RX ADMIN — Medication 4 MCG: at 18:35

## 2017-06-06 RX ADMIN — Medication 2 ML: at 11:06

## 2017-06-06 RX ADMIN — PIPERACILLIN SODIUM,TAZOBACTAM SODIUM 3.38 G: 3; .375 INJECTION, POWDER, FOR SOLUTION INTRAVENOUS at 11:11

## 2017-06-06 RX ADMIN — DULOXETINE HYDROCHLORIDE 30 MG: 30 CAPSULE, DELAYED RELEASE ORAL at 08:43

## 2017-06-06 RX ADMIN — FENTANYL CITRATE 50 MCG: 50 INJECTION, SOLUTION INTRAMUSCULAR; INTRAVENOUS at 18:25

## 2017-06-06 RX ADMIN — PIPERACILLIN SODIUM,TAZOBACTAM SODIUM 3.38 G: 3; .375 INJECTION, POWDER, FOR SOLUTION INTRAVENOUS at 16:03

## 2017-06-06 RX ADMIN — Medication 4 MCG: at 18:32

## 2017-06-06 RX ADMIN — PIPERACILLIN SODIUM,TAZOBACTAM SODIUM 3.38 G: 3; .375 INJECTION, POWDER, FOR SOLUTION INTRAVENOUS at 04:10

## 2017-06-06 RX ADMIN — SODIUM CHLORIDE, POTASSIUM CHLORIDE, SODIUM LACTATE AND CALCIUM CHLORIDE: 600; 310; 30; 20 INJECTION, SOLUTION INTRAVENOUS at 18:06

## 2017-06-06 RX ADMIN — PSYLLIUM HUSK 1 PACKET: 3.4 POWDER ORAL at 08:45

## 2017-06-06 RX ADMIN — ACETAMINOPHEN 650 MG: 325 TABLET, FILM COATED ORAL at 06:58

## 2017-06-06 RX ADMIN — IPRATROPIUM BROMIDE 2 SPRAY: 21 SPRAY NASAL at 08:47

## 2017-06-06 ASSESSMENT — COPD QUESTIONNAIRES
COPD: 1
CAT_SEVERITY: MILD

## 2017-06-06 ASSESSMENT — ENCOUNTER SYMPTOMS
ORTHOPNEA: 0
DYSRHYTHMIAS: 1
SEIZURES: 0

## 2017-06-06 NOTE — PLAN OF CARE
Problem: Goal Outcome Summary  Goal: Goal Outcome Summary  Outcome: No Change  A/O w/ forgetfulness; slept well this shift w/ no c/o pain. X2 urine incontinence. Foot remains elevated on wedge with positive posterior tibal pulse. Pt NPO after 0900 (6/6) for repeat I&D flap closure in evening. Scheduled IV zosyn given. Up w/assist x1, non weight bearing LLE ordered. Podiatry/ID/vascular following. Will continue to monitor.

## 2017-06-06 NOTE — ANESTHESIA PREPROCEDURE EVALUATION
"Procedure: Procedure(s):  AMPUTATE FOOT  Preop diagnosis: unknown    Allergies   Allergen Reactions     Oxycodone Other (See Comments)     \"TERRIBLE SWEATING\"     Sulfa Drugs      Tetracycline      Past Medical History:   Diagnosis Date     Aortic root dilatation (H)      Benign essential hypertension 1/6/2017     Benign non-nodular prostatic hyperplasia with lower urinary tract symptoms 10/20/2016     CAD (coronary artery disease)      Cardiomyopathy (H)      Carotid artery stenosis 10/20/2016     Carotid occlusion, left      Coronary artery disease involving native coronary artery of native heart without angina pectoris 10/20/2016     Diabetes mellitus (H)      DJD (degenerative joint disease)      Gastro-oesophageal reflux disease      Gastroesophageal reflux disease without esophagitis 10/20/2016     Heart attack (H)      Hyperlipidemia      Hypertension      Mild cognitive impairment 10/20/2016     Nephrolithiasis     RECURRENT     Osteopenia      Paroxysmal atrial fibrillation (H)      PONV (postoperative nausea and vomiting)      Unspecified cerebral artery occlusion with cerebral infarction      Past Surgical History:   Procedure Laterality Date     AMPUTATE FOOT Left 6/4/2017    Procedure: AMPUTATE FOOT;  Sun Add#3: partial left foot amputation - Sagital Saw - Mini C-Arm;  Surgeon: Moe Kirk DPM;  Location:  OR     CHOLECYSTECTOMY       ESOPHAGOSCOPY, GASTROSCOPY, DUODENOSCOPY (EGD), COMBINED N/A 12/26/2016    Procedure: COMBINED ESOPHAGOSCOPY, GASTROSCOPY, DUODENOSCOPY (EGD);  Surgeon: Nasim Louis MD;  Location:  GI     GENITOURINARY SURGERY      KIDNEY STONE REMOVAL X 4     HC UGI ENDOSCOPY W EUS N/A 12/29/2016    Procedure: COMBINED ENDOSCOPIC ULTRASOUND, ESOPHAGOSCOPY, GASTROSCOPY, DUODENOSCOPY (EGD);  Surgeon: Nasim Louis MD;  Location:  GI     HERNIA REPAIR       LASER HOLMIUM LITHOTRIPSY URETER(S), INSERT STENT, COMBINED  3/2/2012    Procedure:COMBINED " CYSTOSCOPY, URETEROSCOPY, LASER HOLMIUM LITHOTRIPSY URETER(S), INSERT STENT; CYSTOSCOPY, RIGHT RETROGRADES, RIGHT URETEROSCOPY, HOLMIUM LASER, RIGHT STENT PLACEMENT; Surgeon:ANJELICA LEÓN; Location:[facility] OR     ROTATOR CUFF REPAIR RT/LT       Prior to Admission medications    Medication Sig Start Date End Date Taking? Authorizing Provider   LISINOPRIL PO Take 2.5 mg by mouth daily   Yes Unknown, Entered By History   AMITRIPTYLINE HCL PO Take 25 mg by mouth nightly as needed for sleep   Yes Unknown, Entered By History   ipratropium (ATROVENT) 0.03 % spray Spray 2 sprays into both nostrils every 12 hours   Yes Unknown, Entered By History   DOXYCYCLINE HYCLATE PO Take 100 mg by mouth 2 times daily For 10 days 5/30/17 6/9/17 Yes Unknown, Entered By History   DULoxetine (CYMBALTA) 30 MG EC capsule Take 1 capsule (30 mg) by mouth daily 4/21/17  Yes Matt Morrissey MD   glipiZIDE (GLUCOTROL) 10 MG tablet Take 1 tablet (10 mg) by mouth 2 times daily (before meals) 4/21/17  Yes Matt Morrissey MD   metoprolol (TOPROL-XL) 50 MG 24 hr tablet Take 1 tablet (50 mg) by mouth daily 2/24/17  Yes Rafa Samuel MD   omeprazole (PRILOSEC) 40 MG capsule Take 1 capsule (40 mg) by mouth daily Take 30-60 minutes before a meal. 1/27/17  Yes Matt Morrissey MD   tamsulosin (FLOMAX) 0.4 MG capsule Take 1 capsule (0.4 mg) by mouth daily 1/27/17  Yes Matt Morrissey MD   aspirin EC 81 MG EC tablet Take 1 tablet (81 mg) by mouth daily 1/21/17  Yes Tra Reynoso MD   HYDROcodone-acetaminophen (NORCO) 5-325 MG per tablet Take 1-2 tablets by mouth every 4 hours as needed for moderate to severe pain Reported on 3/7/2017   Yes Unknown, Entered By History   METFORMIN HCL PO Take 1,000 mg by mouth 2 times daily (with meals)   Yes Reported, Patient   ATORVASTATIN CALCIUM PO Take 80 mg by mouth daily   Yes Reported, Patient   Psyllium (METAMUCIL PO) Take 1 packet by mouth daily    Yes Reported, Patient   Clopidogrel Bisulfate,  [id number], (PLAVIX PO) Take 75 mg by mouth daily    Yes Reported, Patient   blood glucose monitoring (NO BRAND SPECIFIED) test strip Use to test blood sugar three times daily or as directed. 9/8/16   Matt Morrissey MD   order for DME Equipment being ordered: [employer] Blood Glucose meter. 9/7/16   Matt Morrissey MD     Current Facility-Administered Medications Ordered in Epic   Medication Dose Route Frequency Last Rate Last Dose     [Auto Hold] lidocaine 1 % 0.5-5 mL  0.5-5 mL Other Q1H PRN   2 mL at 06/06/17 1106     [Auto Hold] sodium chloride (PF) 0.9% PF flush 10-20 mL  10-20 mL Intracatheter Q1H PRN   20 mL at 06/06/17 1107     [Auto Hold] sodium chloride (PF) 0.9% PF flush 10 mL  10 mL Intracatheter Q8H         [Auto Hold] sodium chloride (PF) 0.9% PF flush 10 mL  10 mL Intracatheter Q8H   10 mL at 06/06/17 1346     [Auto Hold] metFORMIN (GLUCOPHAGE) tablet 1,000 mg  1,000 mg Oral BID w/meals   1,000 mg at 06/06/17 0843     [Auto Hold] insulin glargine (LANTUS) injection 4 Units  4 Units Subcutaneous At Bedtime   4 Units at 06/05/17 2152     Hold: Metformin and metformin containing medications on day of the procedure and for 48 hours after IV contrast given   Does not apply HOLD         [Auto Hold] insulin aspart (NovoLOG) inj (RAPID ACTING)  1-10 Units Subcutaneous TID AC   3 Units at 06/05/17 1225     [Auto Hold] insulin aspart (NovoLOG) inj (RAPID ACTING)  1-7 Units Subcutaneous At Bedtime   2 Units at 06/03/17 2242     [Auto Hold] amitriptyline (ELAVIL) tablet 25 mg  25 mg Oral At Bedtime PRN         [Auto Hold] aspirin EC EC tablet 81 mg  81 mg Oral Daily   81 mg at 06/05/17 0921     [Auto Hold] atorvastatin (LIPITOR) tablet 80 mg  80 mg Oral Daily   80 mg at 06/06/17 0843     [Auto Hold] DULoxetine (CYMBALTA) EC capsule 30 mg  30 mg Oral Daily   30 mg at 06/06/17 0843     [Auto Hold] glipiZIDE (GLUCOTROL) tablet 10 mg  10 mg Oral BID AC   10 mg at 06/06/17 1604     [Auto Hold]  HYDROcodone-acetaminophen (NORCO) 5-325 MG per tablet 1-2 tablet  1-2 tablet Oral Q4H PRN   2 tablet at 06/03/17 1230     [Auto Hold] ipratropium (ATROVENT) 0.03 % spray 2 spray  2 spray Both Nostrils Q12H   2 spray at 06/06/17 0847     [Auto Hold] metoprolol (TOPROL-XL) 24 hr tablet 50 mg  50 mg Oral Daily   50 mg at 06/06/17 0843     [Auto Hold] omeprazole (priLOSEC) CR capsule 40 mg  40 mg Oral Daily   40 mg at 06/06/17 0843     [Auto Hold] psyllium (METAMUCIL/KONSYL) Packet 1 packet  1 packet Oral Daily   1 packet at 06/06/17 0845     [Auto Hold] tamsulosin (FLOMAX) capsule 0.4 mg  0.4 mg Oral Daily   0.4 mg at 06/06/17 0843     [Auto Hold] naloxone (NARCAN) injection 0.1-0.4 mg  0.1-0.4 mg Intravenous Q2 Min PRN         [Auto Hold] acetaminophen (TYLENOL) tablet 650 mg  650 mg Oral Q4H PRN   650 mg at 06/06/17 0658     [Auto Hold] ondansetron (ZOFRAN-ODT) ODT tab 4 mg  4 mg Oral Q6H PRN        Or     [Auto Hold] ondansetron (ZOFRAN) injection 4 mg  4 mg Intravenous Q6H PRN         [Auto Hold] piperacillin-tazobactam (ZOSYN) 3.375 g vial to attach to  mL bag  3.375 g Intravenous Q6H 100 mL/hr at 06/05/17 1133 3.375 g at 06/06/17 1603     [Auto Hold] glucose 40 % gel 15-30 g  15-30 g Oral Q15 Min PRN        Or     [Auto Hold] dextrose 50 % injection 25-50 mL  25-50 mL Intravenous Q15 Min PRN        Or     [Auto Hold] glucagon injection 1 mg  1 mg Subcutaneous Q15 Min PRN         No current Epic-ordered outpatient prescriptions on file.     Wt Readings from Last 1 Encounters:   06/06/17 62.3 kg (137 lb 5.6 oz)     Temp Readings from Last 1 Encounters:   06/06/17 37  C (98.6  F) (Oral)     BP Readings from Last 6 Encounters:   06/06/17 152/54   04/21/17 131/53   03/07/17 126/82   02/24/17 102/60   01/27/17 138/59   01/21/17 143/65     Pulse Readings from Last 4 Encounters:   06/06/17 105   04/21/17 59   03/07/17 50   02/24/17 90     Resp Readings from Last 1 Encounters:   06/06/17 18     SpO2 Readings from  Last 1 Encounters:   06/06/17 96%     Recent Labs   Lab Test  06/04/17   0830  06/03/17   0655  06/02/17   0707  06/01/17   0656  05/31/17   1800   NA   --    --    --   141  139   POTASSIUM  4.1   --    --   4.6  4.4   CHLORIDE   --    --    --   105  103   CO2   --    --    --   33*  30   ANIONGAP   --    --    --   3  6   GLC   --    --    --   164*  249*   BUN   --    --    --   12  15   CR   --   0.68  0.66  0.64*  0.62*   ALLISON   --    --    --   8.7  8.8     Recent Labs   Lab Test  06/01/17   0656  05/31/17   1800   WBC  8.0  9.5   HGB  11.5*  12.4*   PLT  158  192     Recent Labs   Lab Test  01/20/17   1030  08/19/09   2100   INR  1.03  0.99      RECENT LABS:     ECG: Sinus tachy, RBBB, PACs (2/2017)    ECHO: 2017  Interpretation Summary     The rhythm was atrial fibrillation with controlled ventricular rate at rest  with an IVCD also.  The left ventricle is normal in size. Left ventricular systolic function is  moderately reduced as the visual ejection fraction is estimated at 35-40%.  The right ventricular systolic function is normal.  The aortic Sinus(es) of Valsalva are mildly dilated at 4.1 cm. with a normal  size ascending aorta.  There is trace mitral regurgitation.  There is mild trileaflet aortic sclerosis.  There is sclerosis, calcification, and restriction of the aortic valve opening  compatible with trivial aortic stenosis.  The left atrium is mildly dilated by volume criteria at 39 ml/m2.    Nuc Stress:  2017  Impression  1.  Myocardial perfusion imaging using single isotope technique  demonstrated a medium-sized perfusion defect of severe intensity  involving the mid to basal inferior and inferoseptal walls as well as  the basal inferolateral wall which is mildly reversible and consistent  with a prior myocardial infarction in the RCA/circumflex distribution  with mild ezekiel-infarct ischemia.   2. Gated images demonstrated akinesis of the mid to basal inferior and  inferolateral walls.  The left  ventricular systolic function is 37% at  rest and 28% on the post stress images.  3. There is no previous study for comparison.    Anesthesia Evaluation     . Pt has had prior anesthetic. Type: General    History of anesthetic complications   - PONV        ROS/MED HX    ENT/Pulmonary:     (+)mild COPD (rare inhaler use; uncommon nighttime oxygen use), , . .   (-) sleep apnea and recent URI   Neurologic: Comment: Mild cognitive impairment    (+)CVA (1999) without deficits   (-) seizures and migraines   Cardiovascular: Comment: Bilateral carotid stenosis, left chronically occluded     AAA, 3.2 cm by CT scan    S/p Left Common Iliac Stent this admit (6/2/2017)    CM ischemic     (+) Dyslipidemia, hypertension-Peripheral Vascular Disease-- Carotid Stenosis and Other, CAD (medical management), -past MI (1970's),-. Taking blood thinners : . CHF etiology: Ischemic Cardiomyopathy - RCA distribution infarct Last EF: ~30-35% date: 2017 . . :. dysrhythmias (Paroxysmal) a-fib, .      (-) orthopnea/PND and syncope   METS/Exercise Tolerance:  >4 METS   Hematologic:         Musculoskeletal: Comment: osteopenia  (+) arthritis, , , -       GI/Hepatic: Comment: Admitted 12/23/2016 with abdominal pain    Acute pancreatic cancer    Pancreatic cancer    (+) GERD Asymptomatic on medication,      (-) liver disease   Renal/Genitourinary:     (+) chronic renal disease, type: CRI, Nephrolithiasis , BPH,       Endo:     (+) type II DM Not using insulin Diabetic complications: neuropathy cardiac problems, .   (-) thyroid disease   Psychiatric:     (+) psychiatric history depression      Infectious Disease: Comment: Osteomyelitis-cellulitis foot        Malignancy:         Other:                     Physical Exam      Airway   Mallampati: II  TM distance: >3 FB  Neck ROM: full    Dental   (+) caps    Cardiovascular   Rhythm and rate: irregular and normal  (-) no murmur    Pulmonary    breath sounds clear to auscultation(-) no wheezes                         Anesthesia Plan      History & Physical Review  History and physical reviewed and following examination; no interval change.    ASA Status:  4 .    NPO Status:  > 8 hours    Plan for MAC   PONV prophylaxis:  Ondansetron (or other 5HT-3)  Continue blood glucose monitoring/treatment perioperatively  Light sedation/slow titration  Limit IVF intake       Postoperative Care  Postoperative pain management:  IV analgesics and Multi-modal analgesia.      Consents  Anesthetic plan, risks, benefits and alternatives discussed with:  Patient, Spouse and Daughter/Son..

## 2017-06-06 NOTE — PROGRESS NOTES
[facility]    Infectious Disease Progress Note    Date of Service (when I saw the patient): 6/5/17     Assessment & Plan   Dustin Pearce is a 89 year old male who was admitted on 5/31/2017.     Impression:   89 y.o male with DM, peripheral neuropathy.   Admitted with worsening in the non healing left foot ulcer.   MRI concerning for osteo.   S/P partial amputation.   OR cultures gram stain positive for GPC, Staph aureus pending Anupam. Bone cultures pending.      Recommendations:   Continue on Zosyn   Will continue to follow surgical plan, cultures.     Anastasia Cee MD    Interval History   Afebrile, negative blood cultures   No new complaints   S/p partial ray amputation    Physical Exam   Temp: 98.9  F (37.2  C) Temp src: Oral BP: 155/64 Pulse: 74 Heart Rate: 72 Resp: 16 SpO2: 94 % O2 Device: None (Room air)    Vitals:    06/04/17 0500 06/05/17 0713 06/06/17 0628   Weight: 75.1 kg (165 lb 9.1 oz) 77.1 kg (169 lb 15.6 oz) 62.3 kg (137 lb 5.6 oz)     Vital Signs with Ranges  Temp:  [96.6  F (35.9  C)-98.9  F (37.2  C)] 98.9  F (37.2  C)  Pulse:  [72-74] 74  Heart Rate:  [62-73] 72  Resp:  [15-16] 16  BP: (124-163)/(50-65) 155/64  SpO2:  [94 %-99 %] 94 %    Constitutional: Awake, alert, cooperative, no apparent distress  Lungs: Clear to auscultation bilaterally, no crackles or wheezing  Cardiovascular: Regular rate and rhythm, normal S1 and S2, and no murmur noted  Abdomen: Normal bowel sounds, soft, non-distended, non-tender  Skin: No rashes, no cyanosis, no edema  Other:    Medications        metFORMIN  1,000 mg Oral BID w/meals     insulin glargine  4 Units Subcutaneous At Bedtime     insulin aspart  1-10 Units Subcutaneous TID AC     insulin aspart  1-7 Units Subcutaneous At Bedtime     aspirin  81 mg Oral Daily     atorvastatin (LIPITOR) tablet 80 mg  80 mg Oral Daily     DULoxetine  30 mg Oral Daily     glipiZIDE  10 mg Oral BID AC     ipratropium  2 spray Both Nostrils Q12H     metoprolol  50 mg  Oral Daily     omeprazole  40 mg Oral Daily     psyllium  1 packet Oral Daily     tamsulosin  0.4 mg Oral Daily     sodium chloride (PF)  3 mL Intracatheter Q8H     piperacillin-tazobactam  3.375 g Intravenous Q6H       Data   All microbiology laboratory data reviewed.  Recent Labs   Lab Test  06/01/17   0656  05/31/17   1800  03/07/17   2142   WBC  8.0  9.5  11.6*   HGB  11.5*  12.4*  14.0   HCT  35.9*  38.5*  44.1   MCV  95  96  97   PLT  158  192  198     Recent Labs   Lab Test  06/03/17   0655  06/02/17   0707  06/01/17   0656   CR  0.68  0.66  0.64*     Recent Labs   Lab Test  05/31/17   1800   SED  43*     Recent Labs   Lab Test  06/04/17   1027  06/04/17   1016  05/31/17   1930  05/31/17   1905   CULT  Culture negative monitoring continues  Moderate growth Staphylococcus aureus  Culture in progress  *  Culture negative monitoring continues  Culture negative monitoring continues  No growth  No growth

## 2017-06-06 NOTE — PROGRESS NOTES
[facility]    Internal Medicine Hospitalist Progress Note  06/06/2017  I evaluated patient on the above date.    Faustino Aguilar Jr., MD  [phone] (p)  Text Page (7 am to 6 pm)      Assessment & Plan Dustin Pearce is a 89 year old male with history including DM2, HTN, CAD, CHF, PAF, CVA, PAD, DLD, GERD and BPH, who presented 5/31 with worsening L toe/foot ulcer/wound.     1.  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  * L foot x-ray 5/31 no acute findings. Started on Zosyn 5/31.  * MRI L foot 6/1 showed signs suspicious for osteomyelitis 5th MT head and 5th toe prox phalangeal base; 5th MT joint periarticular fluid signal intensity, possibly representing developing soft tissue abscess; dorsal and deeper soft tissue edema within the forefoot.  * Seen by Podiatry and Vascular.   * Underwent vascular w/u as below and underwent L MEG stenting and L SFA angioplasty on 6/2 and subseuqently felt that there was adequate blood supply for healing.  * S/p L partial 5th ray amputation 6/4/2017.  * Micro: BC's 5/31 NGTD. Tissue cultures L foot 6/4 pending. Abcess cultures L foot 6/4 pending.  - Plan would closure today 6/6.  - Continue Zosyn (started 5/31).  - Monitor cultures.  - Apprec help from Podiatry.  - ID following, apprec help.     2. Peripheral arterial disease with nonhealing, infected wound - s/p L MEG stent and L SFA angioplasty on 6/2.  * H/o carotid disease.  * Seen by Vascular this stay.  * KRYSTINA 6/1 suggested significant arterial insufficiency.   * Carotid US 6/2 showed 50-79& in R and occlusion of the L. LE angiogram 6/2 showed findings including significant stenosis found in left common iliac artery which was stented with a 10mm stent; L SFA stenosis noted and was angioplastied w 4 mm balloon.  - Continue ASA.  - F/u with Vascular 3 months.     3. DM II with peripheral  neuropathy - uncontrolled.  [PTA: glipizide 10 mg BID, metformin 1000 mg BID.]  * Hgb A1C 8.3 4/2017.  * Lantus started 6/3.   Recent Labs   Lab Test  06/06/17   1355  06/06/17   1237  06/06/17   0750  06/06/17   0204  06/05/17   2146  06/05/17   1730   06/01/17   0656   05/31/17   1800   01/20/17   1030  01/16/17   1033   GLC  140*   --    --    --    --    --    --   164*   --   249*   --   193*  337*   BGM   --   184*  134*  114*  141*  80   < >   --    < >   --    < >   --    --     < > = values in this interval not displayed.   - Continue glipizide 10 mg BID.  - Continue Lantus 4U qHS.  - Continue metformin 1000 mg BID.  - Continue ISS.  - After discharge, f/u with PCP regarding adjusting oral regimen; alternatively, consider d/c home with Lantus.    4. CAD, CHF.  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 Echo 1/2017 showed LVEF 35-40%. Nuc stress 3/2017 showed findings consistent with prior MI in the RCA/cirumflex distribution with mild ezekiel-infarct ischemia; EF 37% at rest and 28% post stress.  - Hold Plavix until surgeries done.  - Continue ASA, atorvastatin, metoprolol.  - Hold lisinopril for now.  - Monitor i/o's, daily wts.    5. Hypertension (benign essential).  [PTA: lisinopril 2.5 mg daily, metoprolol XL 50 mg daily.]  - Continue metoprolol XL 50 mg daily.  - Hold lisinopril for now.    6. Cerebrovascular disease.  * H/o CVI.  - Continue ASA.  - Should be on AC, but defer further discussions outpt.     7. Afib  - Continue ASA, metoprolol.  - Should be on AC, but defer further discussions outpt.     8. Depression.  - Continue amitryptiline and duloxetine.      9. GERD.  - Continue omeprazole.     10. BPH.  - Continue Flomax.      Prophylaxis.  - PCD's.    CODE STATUS: FULL    Dispo.  - Pending above.  - Possible d/c to TCU 6/7 if wound stable.    Interval History   Doing OK.  No CP or SOB.      -Data reviewed today: I  "reviewed all new labs and imaging over the last 24 hours. I personally reviewed no images or EKG's today.    Physical Exam   Heart Rate: 72, Blood pressure 135/55, pulse 74, temperature 97.5  F (36.4  C), temperature source Oral, resp. rate 16, height 1.651 m (5' 5\"), weight 62.3 kg (137 lb 5.6 oz), SpO2 95 %.  Vitals:    06/04/17 0500 06/05/17 0713 06/06/17 0628   Weight: 75.1 kg (165 lb 9.1 oz) 77.1 kg (169 lb 15.6 oz) 62.3 kg (137 lb 5.6 oz)     Vital Signs with Ranges  Temp:  [96.6  F (35.9  C)-98.9  F (37.2  C)] 97.5  F (36.4  C)  Pulse:  [72-74] 74  Heart Rate:  [72-73] 72  Resp:  [15-16] 16  BP: (135-163)/(55-65) 135/55  SpO2:  [94 %-99 %] 95 %  Patient Vitals for the past 24 hrs:   BP Temp Temp src Pulse Heart Rate Resp SpO2 Weight   06/06/17 1120 135/55 97.5  F (36.4  C) Oral - 72 16 95 % -   06/06/17 0730 155/64 98.9  F (37.2  C) Oral - 72 16 94 % -   06/06/17 0700 - - - - - - 95 % -   06/06/17 0628 - - - - - - - 62.3 kg (137 lb 5.6 oz)   06/06/17 0110 163/65 96.9  F (36.1  C) Oral - 73 15 96 % -   06/05/17 2027 141/60 96.6  F (35.9  C) Oral 74 - 16 99 % -   06/05/17 1721 153/57 97.5  F (36.4  C) Oral 72 - 16 96 % -     I/O's Last 24 hours  I/O last 3 completed shifts:  In: 1976 [P.O.:600; I.V.:1376]  Out: -     Constitutional: Alert, oriented, pleasant.  Respiratory: Clear, no crackles/wheezing.  Cardiovascular: Fairly regular, no m/r/g.  GI:   Skin/Integumen:   Other:        Data     Recent Labs  Lab 06/06/17  1355 06/04/17  0830 06/03/17  0655 06/02/17  0707 06/01/17  0656 05/31/17  1800   WBC  --   --   --   --  8.0 9.5   HGB  --   --   --   --  11.5* 12.4*   MCV  --   --   --   --  95 96   PLT  --   --   --   --  158 192   NA  --   --   --   --  141 139   POTASSIUM 3.6 4.1  --   --  4.6 4.4   CHLORIDE  --   --   --   --  105 103   CO2  --   --   --   --  33* 30   BUN  --   --   --   --  12 15   CR  --   --  0.68 0.66 0.64* 0.62*   ANIONGAP  --   --   --   --  3 6   ALLISON  --   --   --   --  8.7 8.8 "   *  --   --   --  164* 249*     Recent Labs   Lab Test  06/06/17   1355  06/06/17   1237  06/06/17   0750  06/06/17   0204  06/05/17   2146  06/05/17   1730   06/01/17   0656   05/31/17   1800   01/20/17   1030  01/16/17   1033   GLC  140*   --    --    --    --    --    --   164*   --   249*   --   193*  337*   BGM   --   184*  134*  114*  141*  80   < >   --    < >   --    < >   --    --     < > = values in this interval not displayed.         No results found for this or any previous visit (from the past 24 hour(s)).    Medications   All medications were reviewed.       sodium chloride (PF)  10 mL Intracatheter Q8H     sodium chloride (PF)  10 mL Intracatheter Q8H     metFORMIN  1,000 mg Oral BID w/meals     insulin glargine  4 Units Subcutaneous At Bedtime     insulin aspart  1-10 Units Subcutaneous TID AC     insulin aspart  1-7 Units Subcutaneous At Bedtime     aspirin  81 mg Oral Daily     atorvastatin (LIPITOR) tablet 80 mg  80 mg Oral Daily     DULoxetine  30 mg Oral Daily     glipiZIDE  10 mg Oral BID AC     ipratropium  2 spray Both Nostrils Q12H     metoprolol  50 mg Oral Daily     omeprazole  40 mg Oral Daily     psyllium  1 packet Oral Daily     tamsulosin  0.4 mg Oral Daily     piperacillin-tazobactam  3.375 g Intravenous Q6H

## 2017-06-06 NOTE — PLAN OF CARE
Problem: Discharge Planning  Goal: Discharge Planning (Adult, OB, Behavioral, Peds)  Patient's goal is to d/c to TCU

## 2017-06-06 NOTE — PLAN OF CARE
Problem: Goal Outcome Summary  Goal: Goal Outcome Summary  Outcome: No Change  A/O; forgetful at times. VSS on RA. Denies pain. Surgical wound on L food, elevated, dressing CDI. CMS intact, no reports of numbness/tingling. Tolerating regular diet. BM this shift. Incontinent of urine x2. Plan for NPO after 0900 tomorrow (6/6) for repeat I&D flap closure in evening. Scheduled IV zosyn given. Up w/assist x1, non weight bearing LLE ordered. Podiatry/ID/vascular following. Will continue to monitor.

## 2017-06-06 NOTE — PROGRESS NOTES
[facility]    Infectious Disease Progress Note    Date of Service (when I saw the patient): 6/5/17     Assessment & Plan   Dustin Pearce is a 89 year old male who was admitted on 5/31/2017.     Impression:   89 y.o male with DM, peripheral neuropathy.   Admitted with worsening in the non healing left foot ulcer.   MRI concerning for osteo.   S/P partial amputation.   OR cultures gram stain positive for GPC, cultures pending.       Recommendations:   Continue on Zosyn   Will continue to follow surgical plan, cultures.     Anastasia Cee MD    Interval History   Afebrile, negative blood cultures   No new complaints   S/p partial ray amputation    Physical Exam   Temp: 98.9  F (37.2  C) Temp src: Oral BP: 155/64 Pulse: 74 Heart Rate: 72 Resp: 16 SpO2: 94 % O2 Device: None (Room air)    Vitals:    06/04/17 0500 06/05/17 0713 06/06/17 0628   Weight: 75.1 kg (165 lb 9.1 oz) 77.1 kg (169 lb 15.6 oz) 62.3 kg (137 lb 5.6 oz)     Vital Signs with Ranges  Temp:  [96.6  F (35.9  C)-98.9  F (37.2  C)] 98.9  F (37.2  C)  Pulse:  [72-74] 74  Heart Rate:  [62-73] 72  Resp:  [15-16] 16  BP: (124-163)/(50-65) 155/64  SpO2:  [94 %-99 %] 94 %    Constitutional: Awake, alert, cooperative, no apparent distress  Lungs: Clear to auscultation bilaterally, no crackles or wheezing  Cardiovascular: Regular rate and rhythm, normal S1 and S2, and no murmur noted  Abdomen: Normal bowel sounds, soft, non-distended, non-tender  Skin: No rashes, no cyanosis, no edema  Other:    Medications        metFORMIN  1,000 mg Oral BID w/meals     insulin glargine  4 Units Subcutaneous At Bedtime     insulin aspart  1-10 Units Subcutaneous TID AC     insulin aspart  1-7 Units Subcutaneous At Bedtime     aspirin  81 mg Oral Daily     atorvastatin (LIPITOR) tablet 80 mg  80 mg Oral Daily     DULoxetine  30 mg Oral Daily     glipiZIDE  10 mg Oral BID AC     ipratropium  2 spray Both Nostrils Q12H     metoprolol  50 mg Oral Daily     omeprazole  40  mg Oral Daily     psyllium  1 packet Oral Daily     tamsulosin  0.4 mg Oral Daily     sodium chloride (PF)  3 mL Intracatheter Q8H     piperacillin-tazobactam  3.375 g Intravenous Q6H       Data   All microbiology laboratory data reviewed.  Recent Labs   Lab Test  06/01/17   0656  05/31/17   1800  03/07/17   2142   WBC  8.0  9.5  11.6*   HGB  11.5*  12.4*  14.0   HCT  35.9*  38.5*  44.1   MCV  95  96  97   PLT  158  192  198     Recent Labs   Lab Test  06/03/17   0655  06/02/17   0707  06/01/17   0656   CR  0.68  0.66  0.64*     Recent Labs   Lab Test  05/31/17   1800   SED  43*     Recent Labs   Lab Test  06/04/17   1027  06/04/17   1016  05/31/17   1930  05/31/17   1905   CULT  Culture negative monitoring continues  Moderate growth Staphylococcus aureus  Culture in progress  *  Culture negative monitoring continues  Culture negative monitoring continues  No growth  No growth

## 2017-06-06 NOTE — PLAN OF CARE
Problem: Goal Outcome Summary  Goal: Goal Outcome Summary  Outcome: No Change  A/O w/ forgetfulness.  No c/o pain. Urine incontinence at times.  Bowel movement on bedside commode. Foot remains elevated on wedge with positive posterior tibal pulse, warm. Pt NPO after 0900 (6/6) for repeat I&D flap closure at 1800. Midline IV placed by IV team, scheduled IV zosyn given. Up w/assist x1, non weight bearing LLE ordered. Podiatry/ID/vascular following. Will continue to monitor. Plan to transfer to  after surgery.

## 2017-06-06 NOTE — PROGRESS NOTES
Care Transition Initial Assessment - SW  Reason For Consult: discharge planning  Met with: PATIENT    Active Problems:    Coronary artery disease involving native coronary artery of native heart without angina pectoris    Type 2 diabetes mellitus with diabetic peripheral angiopathy without gangrene, without long-term current use of insulin (H)    Benign non-nodular prostatic hyperplasia with lower urinary tract symptoms    Gastroesophageal reflux disease without esophagitis    Benign essential hypertension    Paroxysmal atrial fibrillation (H)    Cardiomyopathy (H)    Diabetic foot ulcer (H)         DATA  Lives With: friend(s)  Living Arrangements: house  Description of Support System: Supportive, Involved  Who is your support system?: Other (specify) (friend)  Support Assessment: Adequate family and caregiver support.  Identified issues/concerns regarding health management: Patient will need TCU at d/c  Quality Of Family Relationships: supportive, involved  Transportation Available: family or friend will provide    ASSESSMENT  Cognitive Status: Alert and oriented X4  Concerns to be addressed: Patient is a 89 year old male who was admitted to the hospital for a diabetic foot ulcer. Prior to hospitalization patient was living at home where he was managing well. Patient is aware that TCU is being recommended. Patient had expressed interest in [facility], [facility], [facility]. SW sent referral to facility anticipating patient will d.c 6/7-6/8. SW will update patient once assessments are complete.     PLAN  Financial costs for the patient includes   Patient given options and choices for discharge to TCU  Patient/family is agreeable to the plan?  YES  Patient Goals and Preferences: [facility], [facility], [facility]  Patient anticipates discharging to:  TCU  Discharge Planner   Discharge Plans in progress: ref sent to TCU facilities  Barriers to discharge plan:   Follow up plan: update once assessments are complete       Entered by: Alba  Wing 06/06/2017 3:19 PM

## 2017-06-07 ENCOUNTER — APPOINTMENT (OUTPATIENT)
Dept: GENERAL RADIOLOGY | Facility: CLINIC | Age: 82
DRG: 617 | End: 2017-06-07
Attending: PODIATRIST
Payer: MEDICARE

## 2017-06-07 LAB
COPATH REPORT: NORMAL
CRP SERPL-MCNC: 107 MG/L (ref 0–8)
ERYTHROCYTE [DISTWIDTH] IN BLOOD BY AUTOMATED COUNT: 13.6 % (ref 10–15)
GLUCOSE BLDC GLUCOMTR-MCNC: 107 MG/DL (ref 70–99)
GLUCOSE BLDC GLUCOMTR-MCNC: 122 MG/DL (ref 70–99)
GLUCOSE BLDC GLUCOMTR-MCNC: 143 MG/DL (ref 70–99)
GLUCOSE BLDC GLUCOMTR-MCNC: 145 MG/DL (ref 70–99)
GLUCOSE BLDC GLUCOMTR-MCNC: 169 MG/DL (ref 70–99)
GRAM STN SPEC: NORMAL
HCT VFR BLD AUTO: 32 % (ref 40–53)
HGB BLD-MCNC: 10.3 G/DL (ref 13.3–17.7)
Lab: NORMAL
MCH RBC QN AUTO: 29.9 PG (ref 26.5–33)
MCHC RBC AUTO-ENTMCNC: 32.2 G/DL (ref 31.5–36.5)
MCV RBC AUTO: 93 FL (ref 78–100)
MICRO REPORT STATUS: NORMAL
PLATELET # BLD AUTO: 178 10E9/L (ref 150–450)
RBC # BLD AUTO: 3.44 10E12/L (ref 4.4–5.9)
SPECIMEN SOURCE: NORMAL
WBC # BLD AUTO: 7 10E9/L (ref 4–11)

## 2017-06-07 PROCEDURE — 25000128 H RX IP 250 OP 636: Performed by: INTERNAL MEDICINE

## 2017-06-07 PROCEDURE — 36415 COLL VENOUS BLD VENIPUNCTURE: CPT | Performed by: INTERNAL MEDICINE

## 2017-06-07 PROCEDURE — 40000239 ZZH STATISTIC VAT ROUNDS

## 2017-06-07 PROCEDURE — 99207 ZZC CDG-MDM COMPONENT: MEETS MODERATE - UP CODED: CPT | Performed by: INTERNAL MEDICINE

## 2017-06-07 PROCEDURE — 25000132 ZZH RX MED GY IP 250 OP 250 PS 637: Mod: GY | Performed by: INTERNAL MEDICINE

## 2017-06-07 PROCEDURE — A9270 NON-COVERED ITEM OR SERVICE: HCPCS | Mod: GY | Performed by: INTERNAL MEDICINE

## 2017-06-07 PROCEDURE — 25000131 ZZH RX MED GY IP 250 OP 636 PS 637: Mod: GY | Performed by: INTERNAL MEDICINE

## 2017-06-07 PROCEDURE — 86140 C-REACTIVE PROTEIN: CPT | Performed by: INTERNAL MEDICINE

## 2017-06-07 PROCEDURE — 25000132 ZZH RX MED GY IP 250 OP 250 PS 637: Mod: GY | Performed by: PHYSICIAN ASSISTANT

## 2017-06-07 PROCEDURE — A9270 NON-COVERED ITEM OR SERVICE: HCPCS | Mod: GY | Performed by: PHYSICIAN ASSISTANT

## 2017-06-07 PROCEDURE — 00000146 ZZHCL STATISTIC GLUCOSE BY METER IP

## 2017-06-07 PROCEDURE — 12000007 ZZH R&B INTERMEDIATE

## 2017-06-07 PROCEDURE — 73630 X-RAY EXAM OF FOOT: CPT | Mod: LT

## 2017-06-07 PROCEDURE — 85027 COMPLETE CBC AUTOMATED: CPT | Performed by: INTERNAL MEDICINE

## 2017-06-07 PROCEDURE — 99233 SBSQ HOSP IP/OBS HIGH 50: CPT | Performed by: INTERNAL MEDICINE

## 2017-06-07 RX ORDER — CEFAZOLIN SODIUM 2 G/100ML
2 INJECTION, SOLUTION INTRAVENOUS EVERY 8 HOURS
Status: DISCONTINUED | OUTPATIENT
Start: 2017-06-07 | End: 2017-06-09 | Stop reason: HOSPADM

## 2017-06-07 RX ORDER — COLCHICINE 0.6 MG/1
0.6 TABLET ORAL DAILY
Status: DISCONTINUED | OUTPATIENT
Start: 2017-06-07 | End: 2017-06-07

## 2017-06-07 RX ORDER — COLCHICINE 0.6 MG/1
0.6 TABLET ORAL DAILY
Status: DISCONTINUED | OUTPATIENT
Start: 2017-06-08 | End: 2017-06-09

## 2017-06-07 RX ORDER — COLCHICINE 0.6 MG/1
1.2 TABLET ORAL DAILY
Status: COMPLETED | OUTPATIENT
Start: 2017-06-07 | End: 2017-06-07

## 2017-06-07 RX ADMIN — GLIPIZIDE 10 MG: 10 TABLET ORAL at 09:09

## 2017-06-07 RX ADMIN — COLCHICINE 1.2 MG: 0.6 TABLET, FILM COATED ORAL at 22:18

## 2017-06-07 RX ADMIN — PIPERACILLIN SODIUM,TAZOBACTAM SODIUM 3.38 G: 3; .375 INJECTION, POWDER, FOR SOLUTION INTRAVENOUS at 09:08

## 2017-06-07 RX ADMIN — INSULIN GLARGINE 4 UNITS: 100 INJECTION, SOLUTION SUBCUTANEOUS at 22:01

## 2017-06-07 RX ADMIN — ATORVASTATIN CALCIUM 80 MG: 40 TABLET, FILM COATED ORAL at 09:09

## 2017-06-07 RX ADMIN — TAMSULOSIN HYDROCHLORIDE 0.4 MG: 0.4 CAPSULE ORAL at 09:08

## 2017-06-07 RX ADMIN — DULOXETINE HYDROCHLORIDE 30 MG: 30 CAPSULE, DELAYED RELEASE ORAL at 09:09

## 2017-06-07 RX ADMIN — PIPERACILLIN SODIUM,TAZOBACTAM SODIUM 3.38 G: 3; .375 INJECTION, POWDER, FOR SOLUTION INTRAVENOUS at 03:17

## 2017-06-07 RX ADMIN — IPRATROPIUM BROMIDE 2 SPRAY: 21 SPRAY NASAL at 12:23

## 2017-06-07 RX ADMIN — METOPROLOL SUCCINATE 50 MG: 50 TABLET, EXTENDED RELEASE ORAL at 09:09

## 2017-06-07 RX ADMIN — CEFAZOLIN SODIUM 2 G: 2 INJECTION, SOLUTION INTRAVENOUS at 15:34

## 2017-06-07 RX ADMIN — GLIPIZIDE 10 MG: 10 TABLET ORAL at 16:40

## 2017-06-07 RX ADMIN — METFORMIN HYDROCHLORIDE 1000 MG: 500 TABLET, FILM COATED ORAL at 17:38

## 2017-06-07 RX ADMIN — ASPIRIN 81 MG: 81 TABLET, COATED ORAL at 09:09

## 2017-06-07 RX ADMIN — OMEPRAZOLE 40 MG: 20 CAPSULE, DELAYED RELEASE ORAL at 09:08

## 2017-06-07 RX ADMIN — METFORMIN HYDROCHLORIDE 1000 MG: 500 TABLET, FILM COATED ORAL at 09:08

## 2017-06-07 RX ADMIN — IPRATROPIUM BROMIDE 2 SPRAY: 21 SPRAY NASAL at 22:01

## 2017-06-07 RX ADMIN — PSYLLIUM HUSK 1 PACKET: 3.4 POWDER ORAL at 09:08

## 2017-06-07 NOTE — PLAN OF CARE
Problem: Goal Outcome Summary  Goal: Goal Outcome Summary  Outcome: No Change  VSS. Afebrile. Up with Assist of 1 and a walker. Non-wt bearing to LLE. LS clear. BS active, loose BM this evening. Voiding. CMS intact. Pulses palpable. Denies pain. Incision dressing c/d/i. Leg elevated. Will continue to monitor.

## 2017-06-07 NOTE — ANESTHESIA CARE TRANSFER NOTE
Patient: Dustin B Fligge    Procedure(s):  REVISIONAL LEFT FOOT INCISION AND DRAINAGE WITH POSSIBLE FLAP CLOSURE (SAGITAL SAW,DOUBLE ANTIBIOTIC SOLUTION, MINI C-ARM)  - Wound Class: III-Contaminated    Diagnosis: INFECTED FOOT  Diagnosis Additional Information: No value filed.    Anesthesia Type:   MAC     Note:  Airway :Nasal Cannula  Patient transferred to:PACU        Vitals: (Last set prior to Anesthesia Care Transfer)    CRNA VITALS  6/6/2017 1831 - 6/6/2017 1906      6/6/2017             Resp Rate (set): 10                Electronically Signed By: ASPEN Her CRNA  June 6, 2017  7:06 PM

## 2017-06-07 NOTE — PLAN OF CARE
Problem: Goal Outcome Summary  Goal: Goal Outcome Summary  Outcome: Improving  A&O. Up w/ 1 and walker. CMS intact, dressing cdi. LLE elevated on wedge. Denies pain. Midline in LUE. VSS. 2AM B. Continue to monitor.

## 2017-06-07 NOTE — ANESTHESIA POSTPROCEDURE EVALUATION
Patient: Dustin B Fligge    Procedure(s):  REVISIONAL LEFT FOOT INCISION AND DRAINAGE WITH POSSIBLE FLAP CLOSURE , ANTIBIOTIC SOLUTION  - Wound Class: III-Contaminated    Diagnosis:INFECTED FOOT  Diagnosis Additional Information: No value filed.    Anesthesia Type:  MAC    Note:  Anesthesia Post Evaluation    Patient location during evaluation: PACU  Patient participation: Able to fully participate in evaluation  Level of consciousness: awake  Pain management: adequate  Airway patency: patent  Cardiovascular status: acceptable  Respiratory status: acceptable  Hydration status: acceptable  PONV: controlled     Anesthetic complications: None          Last vitals:  Vitals:    06/06/17 1634 06/06/17 1903 06/06/17 1953   BP: 152/54  150/62   Pulse: 105  65   Resp: 18 18 18   Temp: 37  C (98.6  F) 37.4  C (99.3  F) 36.7  C (98  F)   SpO2: 96%  96%         Electronically Signed By: Xiomara López MD  June 6, 2017  8:20 PM

## 2017-06-07 NOTE — PROGRESS NOTES
Received call from pt's significant other, Hlaina ([phone]).  She would like to be involved in the discharge planning process, and is listed as pt's caregiver on d/c paperwork.  She lives at the same address, and states she does have a POA document in a safe--I requested she bring this in to be scanned.  Updated SW that Halina would like to be involved in d/c planning.    Elda ARAUJO

## 2017-06-07 NOTE — PROGRESS NOTES
[facility]    Internal Medicine Hospitalist Progress Note  06/07/2017  I evaluated patient on the above date.    Faustino Aguilar Jr., MD  [phone] (p)  Text Page (7 am to 6 pm)      Assessment & Plan Dustin Pearce is a 89 year old male with history including DM2, HTN, CAD, CHF, PAF, CVA, PAD, DLD, GERD and BPH, who presented 5/31 with worsening L toe/foot ulcer/wound.     1.  Left foot infected diabetic and ulcer/wound involving 5th toe with abscess, osteomyelitis and overlying cellulitis - s/p L partial 5th ray amputation 6/4/2017, s/p revision I&D with further resection necrotic tissue 6/6/2017.  * L foot x-ray 5/31 no acute findings. Started on Zosyn 5/31.  * MRI L foot 6/1 showed signs suspicious for osteomyelitis 5th MT head and 5th toe prox phalangeal base; 5th MT joint periarticular fluid signal intensity, possibly representing developing soft tissue abscess; dorsal and deeper soft tissue edema within the forefoot.  * Seen by Podiatry and Vascular.   * Underwent vascular w/u as below and underwent L MEG stenting and L SFA angioplasty on 6/2 and subseuqently felt that there was adequate blood supply for healing.  * S/p L partial 5th ray amputation 6/4/2017.  * S/p revision I&D with resection of necrotic tissue down to and including deep fascia 6/6.   * Micro: BC's 5/31 NGTD. Tissue cultures L foot 6/4 negative. Abcess cultures L foot 6/4 growing mod growth MSSA. L foot cultures 6/6 pending.  - Continue Zosyn (started 5/31).  - Monitor cultures.  - Post-op mgmt and further surgery per Podiatry.  - ID following, apprec help.     2. Peripheral arterial disease with nonhealing, infected wound - s/p L MEG stent and L SFA angioplasty on 6/2.  * H/o carotid disease.  * Seen by Vascular this stay.  * KRYSTINA 6/1 suggested significant arterial insufficiency.   * Carotid US 6/2 showed 50-79& in R and occlusion of the L. LE angiogram 6/2 showed findings including significant stenosis found in left common  iliac artery which was stented with a 10mm stent; L SFA stenosis noted and was angioplastied w 4 mm balloon.  - Continue ASA.  - F/u with Vascular 3 months.     3. DM II with peripheral neuropathy - uncontrolled.  [PTA: glipizide 10 mg BID, metformin 1000 mg BID.]  * Hgb A1C 8.3 4/2017.  * Lantus started 6/3.   Recent Labs   Lab Test  06/07/17   0631  06/07/17   0148  06/06/17   2129  06/06/17   1632  06/06/17   1355  06/06/17   1237   06/01/17   0656   05/31/17   1800   01/20/17   1030  01/16/17   1033   GLC   --    --    --    --   140*   --    --   164*   --   249*   --   193*  337*   BGM  107*  122*  139*  109*   --   184*   < >   --    < >   --    < >   --    --     < > = values in this interval not displayed.   - Continue glipizide 10 mg BID.  - Continue Lantus 4U qHS.  - Continue metformin 1000 mg BID.  - Continue ISS.  - After discharge, f/u with PCP regarding adjusting oral regimen; alternatively, consider d/c home with Lantus.    4. CAD, CHF.  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 Echo 1/2017 showed LVEF 35-40%. Nuc stress 3/2017 showed findings consistent with prior MI in the RCA/cirumflex distribution with mild ezekiel-infarct ischemia; EF 37% at rest and 28% post stress.  - Hold Plavix until surgeries done.  - Continue ASA, atorvastatin, metoprolol.  - Hold lisinopril for now.  - Monitor i/o's, daily wts.    5. Hypertension (benign essential).  [PTA: lisinopril 2.5 mg daily, metoprolol XL 50 mg daily.]  - Continue metoprolol XL 50 mg daily.  - Hold lisinopril for now.    6. Cerebrovascular disease.  * H/o CVI.  - Continue ASA.  - Should be on AC, but defer further discussions outpt.     7. Afib  - Continue ASA, metoprolol.  - Should be on AC, but defer further discussions outpt.     8. Depression.  - Continue amitryptiline and duloxetine.      9. GERD.  - Continue omeprazole.     10. BPH.  - Continue Flomax.  "     Prophylaxis.  - PCD's.    CODE STATUS: FULL    Dispo.  - Pending above.  - Possible d/c to TCU 1-2 days if wound stable.    Interval History   Doing OK. Offers no c/o's. Denies pain.    -Data reviewed today: I reviewed all new labs and imaging over the last 24 hours. I personally reviewed no images or EKG's today.    Physical Exam   Heart Rate: 79, Blood pressure 140/54, pulse 65, temperature 97.9  F (36.6  C), temperature source Oral, resp. rate 16, height 1.651 m (5' 5\"), weight 62.3 kg (137 lb 5.6 oz), SpO2 97 %.  Vitals:    06/04/17 0500 06/05/17 0713 06/06/17 0628   Weight: 75.1 kg (165 lb 9.1 oz) 77.1 kg (169 lb 15.6 oz) 62.3 kg (137 lb 5.6 oz)     Vital Signs with Ranges  Temp:  [97.5  F (36.4  C)-99.3  F (37.4  C)] 97.9  F (36.6  C)  Pulse:  [] 65  Heart Rate:  [65-79] 79  Resp:  [16-18] 16  BP: (135-154)/(54-76) 140/54  SpO2:  [93 %-99 %] 97 %  Patient Vitals for the past 24 hrs:   BP Temp Temp src Pulse Heart Rate Resp SpO2   06/07/17 0700 140/54 97.9  F (36.6  C) Oral - 79 16 97 %   06/07/17 0447 143/65 98  F (36.7  C) Oral - 67 16 99 %   06/07/17 0000 154/76 98.2  F (36.8  C) Oral - 66 16 98 %   06/06/17 2300 154/65 - - - 65 18 99 %   06/06/17 2200 154/67 - - 65 - - 99 %   06/06/17 2121 - - - - - - 93 %   06/06/17 2100 142/65 - - 63 - 18 99 %   06/06/17 2030 149/64 - - 64 - 18 96 %   06/06/17 1953 150/62 98  F (36.7  C) Oral 65 - 18 96 %   06/06/17 1903 - 99.3  F (37.4  C) Temporal - - 18 -   06/06/17 1634 152/54 98.6  F (37  C) Oral 105 78 18 96 %   06/06/17 1120 135/55 97.5  F (36.4  C) Oral - 72 16 95 %     I/O's Last 24 hours  I/O last 3 completed shifts:  In: 1120 [P.O.:600; I.V.:520]  Out: 551 [Urine:550; Blood:1]    Constitutional: Sleepy, appears comfortable.  Respiratory: Clear, no crackles/wheezing.  Cardiovascular: Fairly regular, no m/r/g.  GI: Soft, nt, +BS.  Skin/Integumen:   Other:        Data     Recent Labs  Lab 06/07/17  0640 06/06/17  1355 06/04/17  0830 06/03/17  0655 " 06/02/17  0707 06/01/17  0656 05/31/17  1800   WBC 7.0  --   --   --   --  8.0 9.5   HGB 10.3*  --   --   --   --  11.5* 12.4*   MCV 93  --   --   --   --  95 96     --   --   --   --  158 192   NA  --   --   --   --   --  141 139   POTASSIUM  --  3.6 4.1  --   --  4.6 4.4   CHLORIDE  --   --   --   --   --  105 103   CO2  --   --   --   --   --  33* 30   BUN  --   --   --   --   --  12 15   CR  --   --   --  0.68 0.66 0.64* 0.62*   ANIONGAP  --   --   --   --   --  3 6   ALLISON  --   --   --   --   --  8.7 8.8   GLC  --  140*  --   --   --  164* 249*     Recent Labs   Lab Test  06/07/17   0631  06/07/17   0148  06/06/17   2129  06/06/17   1632  06/06/17   1355  06/06/17   1237   06/01/17   0656   05/31/17   1800   01/20/17   1030  01/16/17   1033   GLC   --    --    --    --   140*   --    --   164*   --   249*   --   193*  337*   BGM  107*  122*  139*  109*   --   184*   < >   --    < >   --    < >   --    --     < > = values in this interval not displayed.         No results found for this or any previous visit (from the past 24 hour(s)).    Medications   All medications were reviewed.       sodium chloride (PF)  10 mL Intracatheter Q8H     sodium chloride (PF)  10 mL Intracatheter Q8H     sodium chloride (PF)  3 mL Intracatheter Q8H     metFORMIN  1,000 mg Oral BID w/meals     insulin glargine  4 Units Subcutaneous At Bedtime     insulin aspart  1-10 Units Subcutaneous TID AC     insulin aspart  1-7 Units Subcutaneous At Bedtime     aspirin  81 mg Oral Daily     atorvastatin (LIPITOR) tablet 80 mg  80 mg Oral Daily     DULoxetine  30 mg Oral Daily     glipiZIDE  10 mg Oral BID AC     ipratropium  2 spray Both Nostrils Q12H     metoprolol  50 mg Oral Daily     omeprazole  40 mg Oral Daily     psyllium  1 packet Oral Daily     tamsulosin  0.4 mg Oral Daily     piperacillin-tazobactam  3.375 g Intravenous Q6H

## 2017-06-07 NOTE — BRIEF OP NOTE
[facility] Brief Operative Note    Pre-operative diagnosis: INFECTED FOOT   Post-operative diagnosis sp revision I&D with closure left foot wound     Procedure: Procedure(s):  REVISIONAL LEFT FOOT INCISION AND DRAINAGE WITH POSSIBLE FLAP CLOSURE (SAGITAL SAW,DOUBLE ANTIBIOTIC SOLUTION, MINI C-ARM)  - Wound Class: III-Contaminated   Surgeon(s): Surgeon(s) and Role:     * Nabil Ann DPM - Primary   Estimated blood loss: 1 mL    Specimens:   ID Type Source Tests Collected by Time Destination   1 :  Wound Foot, Left ANAEROBIC BACTERIAL CULTURE, GRAM STAIN, WOUND CULTURE AEROBIC BACTERIAL Nabil Ann DPM 6/6/2017  6:46 PM       Findings: No further necrotic/infected deep tissue noted after debridement.  Full closure, no further surgery planned.

## 2017-06-07 NOTE — PROGRESS NOTES
HUBER  I: HUBER was updated that [place] has a bed available for patient. HUBER passed info on to 55 SW.    P: SW will continue to follow and assist as needed.    Alba Dimas, ROYA   *[phone]

## 2017-06-07 NOTE — PROGRESS NOTES
[facility]      Assessment:  89 y/o male with DM, peripheral neuropathy, status post partial left 5th ray open amputation 6/4 and revision with delayed primary closure 6/6 for treatment of osteomyelitis and abscess.     Proximal margin of bone sent to pathology 6/4 is negative for osteomyelitis.   Therefore we can be confident all bone infection was removed.    Plan:  Antibiotic per ID; Ancef  Dressing as changed in a sterile fashion  Will continue to monitor. Would like to see more resolution of cellulitis prior to discharge  NWB is best.  Mode per Physical Therapy  Dr. Kirk will follow up tomorrow.     Exam:   Mild strike-through of serosanguinous drainage on dressing  Minimal edema  Incision well coapted and sutures intact    Pathology 6/4:    SPECIMEN(S):   A: Left 5th metatarsal proximal margin   B: Left 5th proximal phalynx and 5th metatarsal head   C: Left 5th toe     FINAL DIAGNOSIS:   A: Bone, left fifth metatarsal, proximal, biopsy   - Nonneoplastic bone, no evidence of osteomyelitis     B: Fifth proximal phalanx, resection   - Bone with serous atrophy and acute osteomyelitis and with soft tissue   adjacent to bone showing acute inflammation     C: Left fifth toe, resection   - Ischemic change     I have reviewed this specimen and edited this report     Electronically signed out by:   Donal Coles M.D.

## 2017-06-07 NOTE — PROGRESS NOTES
[facility]    Infectious Disease Progress Note    Date of Service (when I saw the patient): 6/5/17     Assessment & Plan   Dustin Pearce is a 89 year old male who was admitted on 5/31/2017.     Impression:   89 y.o male with DM, peripheral neuropathy.   Admitted with worsening in the non healing left foot ulcer.   MRI concerning for osteo.   S/P partial amputation.   OR cultures from tissue positive for MSSA, pathology pending.      Recommendations:   Switch to Ancef.     Anastasia Cee MD    Interval History   Afebrile, negative blood cultures   No new complaints   S/p partial ray amputation    Physical Exam   Temp: 97.9  F (36.6  C) Temp src: Oral BP: 140/54 Pulse: 65 Heart Rate: 79 Resp: 16 SpO2: 97 % O2 Device: Nasal cannula Oxygen Delivery: 2 LPM  Vitals:    06/04/17 0500 06/05/17 0713 06/06/17 0628   Weight: 75.1 kg (165 lb 9.1 oz) 77.1 kg (169 lb 15.6 oz) 62.3 kg (137 lb 5.6 oz)     Vital Signs with Ranges  Temp:  [97.5  F (36.4  C)-99.3  F (37.4  C)] 97.9  F (36.6  C)  Pulse:  [] 65  Heart Rate:  [65-79] 79  Resp:  [16-18] 16  BP: (135-154)/(54-76) 140/54  SpO2:  [93 %-99 %] 97 %    Constitutional: Awake, alert, cooperative, no apparent distress  Lungs: Clear to auscultation bilaterally, no crackles or wheezing  Cardiovascular: Regular rate and rhythm, normal S1 and S2, and no murmur noted  Abdomen: Normal bowel sounds, soft, non-distended, non-tender  Skin: No rashes, no cyanosis, no edema  Other:    Medications        ceFAZolin  2 g Intravenous Q8H     sodium chloride (PF)  10 mL Intracatheter Q8H     sodium chloride (PF)  10 mL Intracatheter Q8H     sodium chloride (PF)  3 mL Intracatheter Q8H     metFORMIN  1,000 mg Oral BID w/meals     insulin glargine  4 Units Subcutaneous At Bedtime     insulin aspart  1-10 Units Subcutaneous TID AC     insulin aspart  1-7 Units Subcutaneous At Bedtime     aspirin  81 mg Oral Daily     atorvastatin (LIPITOR) tablet 80 mg  80 mg Oral Daily      DULoxetine  30 mg Oral Daily     glipiZIDE  10 mg Oral BID AC     ipratropium  2 spray Both Nostrils Q12H     metoprolol  50 mg Oral Daily     omeprazole  40 mg Oral Daily     psyllium  1 packet Oral Daily     tamsulosin  0.4 mg Oral Daily       Data   All microbiology laboratory data reviewed.  Recent Labs   Lab Test  06/07/17   0640  06/01/17   0656  05/31/17   1800   WBC  7.0  8.0  9.5   HGB  10.3*  11.5*  12.4*   HCT  32.0*  35.9*  38.5*   MCV  93  95  96   PLT  178  158  192     Recent Labs   Lab Test  06/03/17   0655  06/02/17   0707  06/01/17   0656   CR  0.68  0.66  0.64*     Recent Labs   Lab Test  05/31/17   1800   SED  43*     Recent Labs   Lab Test  06/06/17   1846  06/04/17   1027  06/04/17   1016  05/31/17   1930  05/31/17   1905   CULT  Pending  Pending  Moderate growth Staphylococcus aureus*  Culture negative monitoring continues  Culture negative monitoring continues  Culture negative monitoring continues  No growth  No growth

## 2017-06-07 NOTE — PLAN OF CARE
Problem: Infection, Risk/Actual (Adult)  Goal: Identify Related Risk Factors and Signs and Symptoms  Related risk factors and signs and symptoms are identified upon initiation of Human [employer] Clinical Practice Guideline (CPG)   Outcome: Improving  Pt alert, oriented, somewhat forgetful.  Needs cues to remain NWB on L, pulse 1+, warm, good sensation but denies pain; dressing CDI--first dressing ordered to be changed by surgeon.  VSS.  Culture results noted, abx adjusted by MD today.  Lotioned skin.  Scrotum reddened due to incontinence; pt states incontinence is not baseline, and has delayed notifying nursing when he voids--educated pt on importance of notifying us right away.  Ivelisse area cleansed, incontinence brief, and baby powder applied.

## 2017-06-07 NOTE — OP NOTE
DATE OF PROCEDURE:  06/06/2017      SURGEON:  Nabil Ann DPM      PREOPERATIVE DIAGNOSIS:  Abscess, osteomyelitis, left foot, status post I&D with partial fifth ray resection by Dr. Etienne Kirk on 6/4/2017.      POSTOPERATIVE DIAGNOSIS:  Abscess, osteomyelitis, left foot, status post I&D with partial fifth ray resection by Dr. Etienne Kirk on 6/4/2017.      PROCEDURE:  Revision irrigation and debridement with resection of necrotic tissue down to and including deep fascia.      PATHOLOGY:   1.  Deep tissue culture was sent off for aerobic and anaerobic cultures.   2.  Previous proximal clean margin.  Pathology specimen was previously sent by Dr. Kirk.      ANESTHESIA:  MAC with local.      HEMOSTASIS:  None.      ESTIMATED BLOOD LOSS:  Less than 1 mL.      MATERIALS:  None.      INJECTABLES:  16 mL of 0.5% Marcaine plain.      COMPLICATIONS:  None apparent.      INDICATIONS FOR PROCEDURE:  Dustin Pearce is a pleasant 89-year-old male who was admitted for infection of the left foot.  Preoperative MRI showed early abscess formation with marrow edema changes consistent with osteomyelitis of the proximal phalanx of the left fifth digit and the fifth metatarsal head.  He underwent I&D with partial fifth ray resection by Dr. Kirk.  Deep tissue cultures were obtained and Dr. Kirk sent off a proximal clean margin fifth metatarsal specimen.  This is currently in process.  He was brought back to the operating room today for revision I&D and closure.      DESCRIPTION OF PROCEDURE:  After obtaining written consent, the patient was transferred to the operating room, placed in supine position on the operating table.  IV sedation was initiated.  The foot was anesthetized with preoperative local.  It was then prepped and draped in normal aseptic fashion.  No tourniquet was used.      Attention was directed to the left foot surgical site where the retention suture was removed.  The wound was opened and  irrigated with copious amounts of antibiotic-impregnated saline.  Nonviable tissue was sharply excised with a rongeur down to and including deep fascia, excising all nonviable tissue.  The fifth metatarsal appeared grossly unremarkable and because the previous specimen was already sent no further proximal margin was harvested.  After adequate flush and debridement the skin margins were freshened with a 15 blade and were reapproximated and closed using 4-0 nylon.      A dry sterile dressing was applied to the patient's left foot.  He appeared to tolerate the procedure and anesthesia well and was transferred to the PACU with vital signs stable and vascular status intact to remaining digits of the foot.  The patient will be readmitted to the floor for IV antibiotics and monitoring.         KIERRA VELAZQUEZ DPM             D: 2017 19:03   T: 2017 10:50   MT: [id number]      Name:     SID AGUIAR   MRN:      [id number]        Account:        [id number]   :      1928           Procedure Date: 2017      Document: [id number]

## 2017-06-08 ENCOUNTER — APPOINTMENT (OUTPATIENT)
Dept: GENERAL RADIOLOGY | Facility: CLINIC | Age: 82
DRG: 617 | End: 2017-06-08
Attending: INTERNAL MEDICINE
Payer: MEDICARE

## 2017-06-08 ENCOUNTER — APPOINTMENT (OUTPATIENT)
Dept: PHYSICAL THERAPY | Facility: CLINIC | Age: 82
DRG: 617 | End: 2017-06-08
Payer: MEDICARE

## 2017-06-08 LAB
ANION GAP SERPL CALCULATED.3IONS-SCNC: 6 MMOL/L (ref 3–14)
BACTERIA SPEC CULT: NO GROWTH
BUN SERPL-MCNC: 12 MG/DL (ref 7–30)
CALCIUM SERPL-MCNC: 8.8 MG/DL (ref 8.5–10.1)
CHLORIDE SERPL-SCNC: 104 MMOL/L (ref 94–109)
CO2 SERPL-SCNC: 28 MMOL/L (ref 20–32)
CREAT SERPL-MCNC: 0.62 MG/DL (ref 0.66–1.25)
CRP SERPL-MCNC: 93.4 MG/L (ref 0–8)
ERYTHROCYTE [DISTWIDTH] IN BLOOD BY AUTOMATED COUNT: 13.6 % (ref 10–15)
GFR SERPL CREATININE-BSD FRML MDRD: ABNORMAL ML/MIN/1.7M2
GLUCOSE BLDC GLUCOMTR-MCNC: 136 MG/DL (ref 70–99)
GLUCOSE BLDC GLUCOMTR-MCNC: 160 MG/DL (ref 70–99)
GLUCOSE BLDC GLUCOMTR-MCNC: 169 MG/DL (ref 70–99)
GLUCOSE BLDC GLUCOMTR-MCNC: 173 MG/DL (ref 70–99)
GLUCOSE SERPL-MCNC: 141 MG/DL (ref 70–99)
HCT VFR BLD AUTO: 33.2 % (ref 40–53)
HGB BLD-MCNC: 10.9 G/DL (ref 13.3–17.7)
Lab: NORMAL
MCH RBC QN AUTO: 30.4 PG (ref 26.5–33)
MCHC RBC AUTO-ENTMCNC: 32.8 G/DL (ref 31.5–36.5)
MCV RBC AUTO: 93 FL (ref 78–100)
MICRO REPORT STATUS: NORMAL
PLATELET # BLD AUTO: 187 10E9/L (ref 150–450)
POTASSIUM SERPL-SCNC: 3.8 MMOL/L (ref 3.4–5.3)
RBC # BLD AUTO: 3.58 10E12/L (ref 4.4–5.9)
SODIUM SERPL-SCNC: 138 MMOL/L (ref 133–144)
SPECIMEN SOURCE: NORMAL
WBC # BLD AUTO: 8.7 10E9/L (ref 4–11)

## 2017-06-08 PROCEDURE — A9270 NON-COVERED ITEM OR SERVICE: HCPCS | Mod: GY | Performed by: PHYSICIAN ASSISTANT

## 2017-06-08 PROCEDURE — 12000007 ZZH R&B INTERMEDIATE

## 2017-06-08 PROCEDURE — 25000128 H RX IP 250 OP 636: Performed by: INTERNAL MEDICINE

## 2017-06-08 PROCEDURE — 85027 COMPLETE CBC AUTOMATED: CPT | Performed by: INTERNAL MEDICINE

## 2017-06-08 PROCEDURE — 40000239 ZZH STATISTIC VAT ROUNDS

## 2017-06-08 PROCEDURE — 36415 COLL VENOUS BLD VENIPUNCTURE: CPT | Performed by: INTERNAL MEDICINE

## 2017-06-08 PROCEDURE — A9270 NON-COVERED ITEM OR SERVICE: HCPCS | Mod: GY | Performed by: INTERNAL MEDICINE

## 2017-06-08 PROCEDURE — 97116 GAIT TRAINING THERAPY: CPT | Mod: GP

## 2017-06-08 PROCEDURE — 25000132 ZZH RX MED GY IP 250 OP 250 PS 637: Mod: GY | Performed by: INTERNAL MEDICINE

## 2017-06-08 PROCEDURE — 86140 C-REACTIVE PROTEIN: CPT | Performed by: INTERNAL MEDICINE

## 2017-06-08 PROCEDURE — 80048 BASIC METABOLIC PNL TOTAL CA: CPT | Performed by: INTERNAL MEDICINE

## 2017-06-08 PROCEDURE — 99232 SBSQ HOSP IP/OBS MODERATE 35: CPT | Performed by: INTERNAL MEDICINE

## 2017-06-08 PROCEDURE — 00000146 ZZHCL STATISTIC GLUCOSE BY METER IP

## 2017-06-08 PROCEDURE — 25000131 ZZH RX MED GY IP 250 OP 636 PS 637: Mod: GY | Performed by: INTERNAL MEDICINE

## 2017-06-08 PROCEDURE — 97530 THERAPEUTIC ACTIVITIES: CPT | Mod: GP

## 2017-06-08 PROCEDURE — 40000193 ZZH STATISTIC PT WARD VISIT

## 2017-06-08 PROCEDURE — 73140 X-RAY EXAM OF FINGER(S): CPT | Mod: RT

## 2017-06-08 PROCEDURE — 25000132 ZZH RX MED GY IP 250 OP 250 PS 637: Mod: GY | Performed by: PHYSICIAN ASSISTANT

## 2017-06-08 RX ADMIN — CEFAZOLIN SODIUM 2 G: 2 INJECTION, SOLUTION INTRAVENOUS at 08:09

## 2017-06-08 RX ADMIN — GLIPIZIDE 10 MG: 10 TABLET ORAL at 16:42

## 2017-06-08 RX ADMIN — GLIPIZIDE 10 MG: 10 TABLET ORAL at 08:10

## 2017-06-08 RX ADMIN — IPRATROPIUM BROMIDE 2 SPRAY: 21 SPRAY NASAL at 08:09

## 2017-06-08 RX ADMIN — METFORMIN HYDROCHLORIDE 1000 MG: 500 TABLET, FILM COATED ORAL at 08:10

## 2017-06-08 RX ADMIN — OMEPRAZOLE 40 MG: 20 CAPSULE, DELAYED RELEASE ORAL at 08:10

## 2017-06-08 RX ADMIN — ATORVASTATIN CALCIUM 80 MG: 40 TABLET, FILM COATED ORAL at 08:10

## 2017-06-08 RX ADMIN — IPRATROPIUM BROMIDE 2 SPRAY: 21 SPRAY NASAL at 22:07

## 2017-06-08 RX ADMIN — METFORMIN HYDROCHLORIDE 1000 MG: 500 TABLET, FILM COATED ORAL at 17:39

## 2017-06-08 RX ADMIN — PSYLLIUM HUSK 1 PACKET: 3.4 POWDER ORAL at 08:09

## 2017-06-08 RX ADMIN — CEFAZOLIN SODIUM 2 G: 2 INJECTION, SOLUTION INTRAVENOUS at 16:42

## 2017-06-08 RX ADMIN — TAMSULOSIN HYDROCHLORIDE 0.4 MG: 0.4 CAPSULE ORAL at 08:10

## 2017-06-08 RX ADMIN — INSULIN GLARGINE 4 UNITS: 100 INJECTION, SOLUTION SUBCUTANEOUS at 22:07

## 2017-06-08 RX ADMIN — COLCHICINE 0.6 MG: 0.6 TABLET, FILM COATED ORAL at 08:10

## 2017-06-08 RX ADMIN — METOPROLOL SUCCINATE 50 MG: 50 TABLET, EXTENDED RELEASE ORAL at 08:10

## 2017-06-08 RX ADMIN — CEFAZOLIN SODIUM 2 G: 2 INJECTION, SOLUTION INTRAVENOUS at 00:26

## 2017-06-08 RX ADMIN — ACETAMINOPHEN 650 MG: 325 TABLET, FILM COATED ORAL at 14:47

## 2017-06-08 RX ADMIN — DULOXETINE HYDROCHLORIDE 30 MG: 30 CAPSULE, DELAYED RELEASE ORAL at 08:10

## 2017-06-08 RX ADMIN — ASPIRIN 81 MG: 81 TABLET, COATED ORAL at 08:10

## 2017-06-08 NOTE — PROGRESS NOTES
Paged by nursing staff about sudden onset right MIP swelling. Patient had progressively worsening right MIP pain and swelling over the past several hours. No injury patient is aware of. Per nursing staff joint was erythematous and warm to touch. On my exam joint is swollen with limited joint mobility secondary to pain. Patient had ice pack over hand so I was unable to feel warmth. No obvious erythema. No pain in joints above or below MIP. Patient taken off Zosyn earlier today after completing 8 day course.     Although this is an unusual location, I am suspecting this may be gout. Will treat with colchicine with repeat BMP and CBC tomorrow AM. Could consider septic arthritis, however, this is lower on my differentials due to being afebrile with now WBC and recently completing course of Zosyn. Continue to monitor.    Tyra Mckeon PA-C  Internal Medicine Hospitalist.    The patient was individually seen and examined by Dr. Whitaker who agrees with the plan as outlined above.

## 2017-06-08 NOTE — PLAN OF CARE
Problem: Goal Outcome Summary  Goal: Goal Outcome Summary  Outcome: No Change  C/o right 5th finger pain, increasing since sometime yesterday. hospitalist notified. Awaiting colchicine from pharmacy. Ice applied and elevated on pillow. See new orders. Denies pain in foot. Elevated on foam wedge. Will continue to monitor.

## 2017-06-08 NOTE — PROGRESS NOTES
[facility]    Infectious Disease Progress Note    Date of Service (when I saw the patient): 6/5/17     Assessment & Plan   Dustin Pearce is a 89 year old male who was admitted on 5/31/2017.     Impression:   89 y.o male with DM, peripheral neuropathy.   Admitted with worsening in the non healing left foot ulcer.   MRI concerning for osteo.   S/P partial amputation.   OR cultures from tissue positive for MSSA, pathology no osteo in the proximal bone     Recommendations:   Continue on Ancef.   Since proximal bone negative to osteo, ok to d/c on oral keflex for 10 days when ready.     Anastasia Cee MD    Interval History   Afebrile, negative blood cultures   No new complaints   S/p partial ray amputation    Physical Exam   Temp: 98  F (36.7  C) Temp src: Oral BP: 166/72 Pulse: 79 Heart Rate: 72 Resp: 16 SpO2: 96 % O2 Device: None (Room air)    Vitals:    06/04/17 0500 06/05/17 0713 06/06/17 0628   Weight: 75.1 kg (165 lb 9.1 oz) 77.1 kg (169 lb 15.6 oz) 62.3 kg (137 lb 5.6 oz)     Vital Signs with Ranges  Temp:  [97.9  F (36.6  C)-98.6  F (37  C)] 98  F (36.7  C)  Pulse:  [73-92] 79  Heart Rate:  [72-92] 72  Resp:  [16] 16  BP: (143-166)/(61-80) 166/72  SpO2:  [94 %-96 %] 96 %    Constitutional: Awake, alert, cooperative, no apparent distress  Lungs: Clear to auscultation bilaterally, no crackles or wheezing  Cardiovascular: Regular rate and rhythm, normal S1 and S2, and no murmur noted  Abdomen: Normal bowel sounds, soft, non-distended, non-tender  Skin: No rashes, no cyanosis, no edema  Other:    Medications        ceFAZolin  2 g Intravenous Q8H     colchicine  0.6 mg Oral Daily     sodium chloride (PF)  10 mL Intracatheter Q8H     metFORMIN  1,000 mg Oral BID w/meals     insulin glargine  4 Units Subcutaneous At Bedtime     insulin aspart  1-10 Units Subcutaneous TID AC     insulin aspart  1-7 Units Subcutaneous At Bedtime     aspirin  81 mg Oral Daily     atorvastatin (LIPITOR) tablet 80 mg  80 mg  Oral Daily     DULoxetine  30 mg Oral Daily     glipiZIDE  10 mg Oral BID AC     ipratropium  2 spray Both Nostrils Q12H     metoprolol  50 mg Oral Daily     omeprazole  40 mg Oral Daily     psyllium  1 packet Oral Daily     tamsulosin  0.4 mg Oral Daily       Data   All microbiology laboratory data reviewed.  Recent Labs   Lab Test  06/08/17   0613  06/07/17   0640  06/01/17   0656   WBC  8.7  7.0  8.0   HGB  10.9*  10.3*  11.5*   HCT  33.2*  32.0*  35.9*   MCV  93  93  95   PLT  187  178  158     Recent Labs   Lab Test  06/08/17   0613  06/03/17   0655  06/02/17   0707   CR  0.62*  0.68  0.66     Recent Labs   Lab Test  05/31/17   1800   SED  43*     Recent Labs   Lab Test  06/06/17   1846  06/04/17   1027  06/04/17   1016  05/31/17   1930  05/31/17   1905   CULT  Culture negative monitoring continues  Culture negative monitoring continues  Moderate growth Staphylococcus aureus*  Culture negative monitoring continues  Culture negative monitoring continues  Culture negative monitoring continues  No growth  No growth

## 2017-06-08 NOTE — PLAN OF CARE
Problem: Goal Outcome Summary  Goal: Goal Outcome Summary  Discharge Planner PT   Patient plan for discharge: TCU being recommended  Current status: Pt requires SBA for supine to sit and sit to/from stand transfers and CGA for gait with NWB on L LE. Pt requires frequent cues for maintaining NWB status on L LE.   Barriers to return to prior living situation: Pt has steps to enter/exit.   Recommendations for discharge: TCU  Rationale for recommendations: Pt having difficulty maintaining NWB on L LE and needing assist for mobility.        Entered by: Chloe Hernandez 06/08/2017 8:32 AM

## 2017-06-08 NOTE — PROGRESS NOTES
Patient c/o pain to right 5th finger. Stated it was somewhat sore earlier but now red. Warm and unable to bend. Hospitalist called at 2105.  MD encouraged elevation and ice and to monitor.

## 2017-06-08 NOTE — PLAN OF CARE
Problem: Goal Outcome Summary  Goal: Goal Outcome Summary  Outcome: Improving  Pt remains A/O up with A2 walker and GB, NWB LLE. Midline IV SL. Tylenol for pain. Redness and warmth to pinky on right hand, xray taken, no new orders at this time. Continue to monitor.

## 2017-06-08 NOTE — PLAN OF CARE
Problem: Goal Outcome Summary  Goal: Goal Outcome Summary  Outcome: Improving  Patient A&Ox4, VSS ON RA. Dressing c/d/i, CMS intact.  Up with A2 & walker. NWB on Left Foot Need reminder. Void on urinal. Had x2 Bm for shift. Denies pain on foot. Elevated with wedge foam. Mid picc line clogged no blood return. Called Iv team & left voice mail.   Will continue monitoring.

## 2017-06-08 NOTE — PROGRESS NOTES
HUBER  D: HUBER following for discharge planning needs.  HUBER noted that per the MD note today, patient may be ready for discharge in 1-2 days.  I: HUBER left a message for Gillian in Admissions at [facility] and for Teresa in Admissions at [facility] to update them on the discharge plan for Friday or Saturday and to follow up regarding bed availability.  SW will follow up regarding transportation once the discharge plan has been finalized.  HUBER updated patients friend, Halina, on the discharge plan for Friday or Saturday.  Halina reports that [facility] would be the first choice for TCU due to it being closer to where they live.  A: Patient is alert and oriented.  P: SW will continue to follow and assist with finalizing the discharge plan as appropriate.    ROYA Lehman      HUBER  D: HUBER following for discharge planning needs.  HUBER received a message from Teresa in Admissions at [facility] stating that they will have a bed available for patient tomorrow.  I: HUBER updated patient and his friend, Halina, on the above and discussed transportation to get patient to Rehab tomorrow.  Halina requested that SW arrange a w/c ride for discharge and is aware that the cost of transportation is not covered under the patients insurance.  HUBER discussed the cost of transportation being a $65 base fee and $4.42 per mile.  HUBER spoke to Willa at [facility] and scheduled transportation for tomorrow at 1500.  HUBER faxed the facesheet to [facility].  HUBER spoke to Gillian in Admissions at [facility] to update her that patient will no longer need a TCU bed there.  HUBER spoke to Teresa in Admissions at [facility] to confirm the bed and to update her on the transport time for tomorrow.  HUBER confirmed that Halina is aware of the $36 per day fee for the private room that is not covered under the patients insurance.  A: Patient is alert and oriented.  P: HUBER will continue  to follow and assist with finalizing the discharge plan as appropriate.    ROYA Lehman      PAS-RR    D: Per DHS regulation, SW completed and submitted PAS-RR to [place] [employer] via the [employer].  PAS-RR confirmation # is : [id number].    I: SW spoke with patient and his friend and they are aware a PAS-RR has been submitted.  SW reviewed with patient and his friend that they may be contacted for a follow up appointment within 10 days of hospital discharge if their SNF stay is < 30 days.  Contact information for [facility] was also provided.    A: Patient and his friend verbalized understanding.    P: Further questions may be directed to [facility] at #[phone], option #4 for PAS-RR staff.    ROYA Lehman

## 2017-06-08 NOTE — PROGRESS NOTES
[facility]    Internal Medicine Hospitalist Progress Note  06/08/2017  I evaluated patient on the above date.    Faustino Aguilar Jr., MD  [phone] (p)  Text Page (7 am to 6 pm)      Assessment & Plan Dustin Pearce is a 89 year old male with history including DM2, HTN, CAD, CHF, PAF, CVA, PAD, DLD, GERD and BPH, who presented 5/31 with worsening L toe/foot ulcer/wound.     1.  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  * L foot x-ray 5/31 no acute findings. Started on Zosyn 5/31.  * MRI L foot 6/1 showed signs suspicious for osteomyelitis 5th MT head and 5th toe prox phalangeal base; 5th MT joint periarticular fluid signal intensity, possibly representing developing soft tissue abscess; dorsal and deeper soft tissue edema within the forefoot.  * Seen by Podiatry and Vascular.   * Underwent vascular w/u as below and underwent L MEG stenting and L SFA angioplasty on 6/2 and subseuqently felt that there was adequate blood supply for healing.  * S/p L partial 5th ray amputation 6/4/2017. Path negative for osteomyelitis.  * S/p revision I&D with resection of necrotic tissue down to and including deep fascia 6/6.   * Zosyn changed to Ancef 6/7.  * Micro: BC's 5/31 NGTD. Tissue cultures L foot 6/4 negative. Abcess cultures L foot 6/4 growing mod growth MSSA. L foot cultures 6/6 pending.  - Continue IV cefazolin (started 6/7); plan Keflex on d/c for 10d per ID rec's.  - Monitor cultures.  - Post-op mgmt and further surgery per Podiatry.  - ID following, apprec help from Dr. Cee.     2. Peripheral arterial disease with nonhealing, infected wound - s/p L MEG stent and L SFA angioplasty on 6/2.  * H/o carotid disease.  * Seen by Vascular this stay.  * KRYSTINA 6/1 suggested significant arterial insufficiency.   * Carotid US 6/2 showed 50-79& in R  and occlusion of the L. LE angiogram 6/2 showed findings including significant stenosis found in left common iliac artery which was stented with a 10mm stent; L SFA stenosis noted and was angioplastied w 4 mm balloon.  - Continue ASA.  - F/u with Vascular 3 months.    3. Acute R 5th PIP joint swelling, ?gout or fracture (but no trauma).  * Noted evening 6/7 with painful, swollen and warm R 5th PIP joint; no apparent trauma; gout suspected and started on colchicine.  * Not much improved yet 6/8.  - Check x-ray.  - Ortho eval.  - Continue colchicine.     4. DM II with peripheral neuropathy - uncontrolled.  [PTA: glipizide 10 mg BID, metformin 1000 mg BID.]  * Hgb A1C 8.3 4/2017.  * Lantus started 6/3.   Recent Labs   Lab Test  06/08/17   0613  06/08/17   0234  06/07/17   2100  06/07/17   1702  06/07/17   1215  06/07/17   0631   06/06/17   1355   06/01/17   0656   05/31/17   1800   01/20/17   1030   GLC  141*   --    --    --    --    --    --   140*   --   164*   --   249*   --   193*   BGM   --   173*  169*  145*  143*  107*   < >   --    < >   --    < >   --    < >   --     < > = values in this interval not displayed.   - Continue glipizide 10 mg BID.  - Continue Lantus 4U qHS.  - Continue metformin 1000 mg BID.  - Continue ISS.  - After discharge, f/u with PCP regarding adjusting oral regimen; alternatively, consider d/c home with Lantus.    5. CAD, CHF.  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 Echo 1/2017 showed LVEF 35-40%. Nuc stress 3/2017 showed findings consistent with prior MI in the RCA/cirumflex distribution with mild ezekiel-infarct ischemia; EF 37% at rest and 28% post stress.  - Hold Plavix until surgeries done.  - Continue ASA, atorvastatin, metoprolol.  - Hold lisinopril for now.  - Monitor i/o's, daily wts.    6. Hypertension (benign essential).  [PTA: lisinopril 2.5 mg daily, metoprolol XL 50 mg daily.]  - Continue  "metoprolol XL 50 mg daily.  - Hold lisinopril for now.    7. Cerebrovascular disease.  * H/o CVI.  - Continue ASA.  - Should be on AC, but defer further discussions outpt.     8. Afib  - Continue ASA, metoprolol.  - Should be on AC, but defer further discussions outpt.     9. Depression.  - Continue amitryptiline and duloxetine.      10. GERD.  - Continue omeprazole.     11. BPH.  - Continue Flomax.      Prophylaxis.  - PCD's.    CODE STATUS: FULL    Dispo.  - Pending above.  - Possible d/c to TCU 1-2 days if wound stable.    Interval History   Acute pain right 5th finger PIP joint last night. Started on colchicine.  Still quite painful this am.   Otherwise doing OK. Tolerating diet, no CP or SOB.    -Data reviewed today: I reviewed all new labs and imaging over the last 24 hours. I personally reviewed no images or EKG's today.    Physical Exam   Heart Rate: 72, Blood pressure 166/72, pulse 79, temperature 98  F (36.7  C), temperature source Oral, resp. rate 16, height 1.651 m (5' 5\"), weight 62.3 kg (137 lb 5.6 oz), SpO2 96 %.  Vitals:    06/04/17 0500 06/05/17 0713 06/06/17 0628   Weight: 75.1 kg (165 lb 9.1 oz) 77.1 kg (169 lb 15.6 oz) 62.3 kg (137 lb 5.6 oz)     Vital Signs with Ranges  Temp:  [97.9  F (36.6  C)-98.6  F (37  C)] 98  F (36.7  C)  Pulse:  [73-92] 79  Heart Rate:  [72-92] 72  Resp:  [16] 16  BP: (143-166)/(61-80) 166/72  SpO2:  [94 %-96 %] 96 %  Patient Vitals for the past 24 hrs:   BP Temp Temp src Pulse Heart Rate Resp SpO2   06/08/17 0700 166/72 98  F (36.7  C) Oral - 72 16 96 %   06/08/17 0335 149/70 98.1  F (36.7  C) Oral - 72 16 96 %   06/08/17 0030 - - - - - 16 -   06/07/17 2337 144/66 98.4  F (36.9  C) Oral - 76 16 94 %   06/07/17 2000 - - - - - 16 - 06/07/17 1917 155/74 98.6  F (37  C) Oral 79 83 16 94 %   06/07/17 1633 166/80 98.6  F (37  C) Oral 92 92 16 95 %   06/07/17 1600 - - - - - 16 - 06/07/17 1115 143/61 97.9  F (36.6  C) Oral 73 - 16 95 %     I/O's Last 24 hours  I/O last 3 " completed shifts:  In: 600 [P.O.:500; I.V.:100]  Out: 375 [Urine:375]    Constitutional: Alert, oriented, pleasant.  Respiratory: Clear, no crackles/wheezing.  Cardiovascular: Fairly regular, no m/r/g.  GI: Soft, nt, +BS.  Skin/Integumen: R 5th finger with swelling and mild erythema around 5th PIP joint, limited and painful with movement, slight warmth.  Other:        Data     Recent Labs  Lab 06/08/17  0613 06/07/17  0640 06/06/17  1355 06/04/17  0830 06/03/17  0655 06/02/17  0707   WBC 8.7 7.0  --   --   --   --    HGB 10.9* 10.3*  --   --   --   --    MCV 93 93  --   --   --   --     178  --   --   --   --      --   --   --   --   --    POTASSIUM 3.8  --  3.6 4.1  --   --    CHLORIDE 104  --   --   --   --   --    CO2 28  --   --   --   --   --    BUN 12  --   --   --   --   --    CR 0.62*  --   --   --  0.68 0.66   ANIONGAP 6  --   --   --   --   --    ALLISON 8.8  --   --   --   --   --    *  --  140*  --   --   --      Recent Labs   Lab Test  06/08/17   0613  06/08/17   0234  06/07/17   2100  06/07/17   1702  06/07/17   1215  06/07/17   0631   06/06/17   1355   06/01/17   0656   05/31/17   1800   01/20/17   1030   GLC  141*   --    --    --    --    --    --   140*   --   164*   --   249*   --   193*   BGM   --   173*  169*  145*  143*  107*   < >   --    < >   --    < >   --    < >   --     < > = values in this interval not displayed.         No results found for this or any previous visit (from the past 24 hour(s)).    Medications   All medications were reviewed.       ceFAZolin  2 g Intravenous Q8H     colchicine  0.6 mg Oral Daily     sodium chloride (PF)  10 mL Intracatheter Q8H     metFORMIN  1,000 mg Oral BID w/meals     insulin glargine  4 Units Subcutaneous At Bedtime     insulin aspart  1-10 Units Subcutaneous TID AC     insulin aspart  1-7 Units Subcutaneous At Bedtime     aspirin  81 mg Oral Daily     atorvastatin (LIPITOR) tablet 80 mg  80 mg Oral Daily     DULoxetine  30 mg Oral  Daily     glipiZIDE  10 mg Oral BID AC     ipratropium  2 spray Both Nostrils Q12H     metoprolol  50 mg Oral Daily     omeprazole  40 mg Oral Daily     psyllium  1 packet Oral Daily     tamsulosin  0.4 mg Oral Daily

## 2017-06-09 ENCOUNTER — APPOINTMENT (OUTPATIENT)
Dept: PHYSICAL THERAPY | Facility: CLINIC | Age: 82
DRG: 617 | End: 2017-06-09
Payer: MEDICARE

## 2017-06-09 VITALS
WEIGHT: 137.35 LBS | OXYGEN SATURATION: 95 % | HEIGHT: 65 IN | DIASTOLIC BLOOD PRESSURE: 65 MMHG | HEART RATE: 72 BPM | BODY MASS INDEX: 22.88 KG/M2 | TEMPERATURE: 98.4 F | SYSTOLIC BLOOD PRESSURE: 150 MMHG | RESPIRATION RATE: 16 BRPM

## 2017-06-09 DIAGNOSIS — I70.209: ICD-10-CM

## 2017-06-09 DIAGNOSIS — I77.1 ILIAC ARTERY STENOSIS, LEFT (H): Primary | ICD-10-CM

## 2017-06-09 LAB
BACTERIA SPEC CULT: NO GROWTH
CRP SERPL-MCNC: 89 MG/L (ref 0–8)
ERYTHROCYTE [DISTWIDTH] IN BLOOD BY AUTOMATED COUNT: 13.9 % (ref 10–15)
GLUCOSE BLDC GLUCOMTR-MCNC: 124 MG/DL (ref 70–99)
GLUCOSE BLDC GLUCOMTR-MCNC: 130 MG/DL (ref 70–99)
GLUCOSE BLDC GLUCOMTR-MCNC: 141 MG/DL (ref 70–99)
GLUCOSE SERPL-MCNC: 162 MG/DL (ref 70–99)
HCT VFR BLD AUTO: 32.8 % (ref 40–53)
HGB BLD-MCNC: 10.8 G/DL (ref 13.3–17.7)
MCH RBC QN AUTO: 30.6 PG (ref 26.5–33)
MCHC RBC AUTO-ENTMCNC: 32.9 G/DL (ref 31.5–36.5)
MCV RBC AUTO: 93 FL (ref 78–100)
MICRO REPORT STATUS: NORMAL
PLATELET # BLD AUTO: 214 10E9/L (ref 150–450)
RBC # BLD AUTO: 3.53 10E12/L (ref 4.4–5.9)
SPECIMEN SOURCE: NORMAL
WBC # BLD AUTO: 7.9 10E9/L (ref 4–11)

## 2017-06-09 PROCEDURE — 25000132 ZZH RX MED GY IP 250 OP 250 PS 637: Mod: GY | Performed by: PHYSICIAN ASSISTANT

## 2017-06-09 PROCEDURE — 99239 HOSP IP/OBS DSCHRG MGMT >30: CPT | Performed by: INTERNAL MEDICINE

## 2017-06-09 PROCEDURE — 25000132 ZZH RX MED GY IP 250 OP 250 PS 637: Mod: GY | Performed by: INTERNAL MEDICINE

## 2017-06-09 PROCEDURE — 00000146 ZZHCL STATISTIC GLUCOSE BY METER IP

## 2017-06-09 PROCEDURE — 85027 COMPLETE CBC AUTOMATED: CPT | Performed by: INTERNAL MEDICINE

## 2017-06-09 PROCEDURE — 82947 ASSAY GLUCOSE BLOOD QUANT: CPT | Performed by: INTERNAL MEDICINE

## 2017-06-09 PROCEDURE — A9270 NON-COVERED ITEM OR SERVICE: HCPCS | Mod: GY | Performed by: INTERNAL MEDICINE

## 2017-06-09 PROCEDURE — A9270 NON-COVERED ITEM OR SERVICE: HCPCS | Mod: GY | Performed by: PHYSICIAN ASSISTANT

## 2017-06-09 PROCEDURE — 25000128 H RX IP 250 OP 636: Performed by: INTERNAL MEDICINE

## 2017-06-09 PROCEDURE — 36415 COLL VENOUS BLD VENIPUNCTURE: CPT | Performed by: INTERNAL MEDICINE

## 2017-06-09 PROCEDURE — 40000193 ZZH STATISTIC PT WARD VISIT

## 2017-06-09 PROCEDURE — 86140 C-REACTIVE PROTEIN: CPT | Performed by: INTERNAL MEDICINE

## 2017-06-09 PROCEDURE — 97116 GAIT TRAINING THERAPY: CPT | Mod: GP

## 2017-06-09 PROCEDURE — 40000239 ZZH STATISTIC VAT ROUNDS

## 2017-06-09 RX ORDER — HYDROCODONE BITARTRATE AND ACETAMINOPHEN 5; 325 MG/1; MG/1
1-2 TABLET ORAL EVERY 4 HOURS PRN
Refills: 0 | Status: SHIPPED | DISCHARGE
Start: 2017-06-09 | End: 2017-06-27

## 2017-06-09 RX ORDER — NAPROXEN 250 MG/1
250 TABLET ORAL 3 TIMES DAILY
Status: DISCONTINUED | OUTPATIENT
Start: 2017-06-09 | End: 2017-06-09 | Stop reason: HOSPADM

## 2017-06-09 RX ORDER — NAPROXEN 250 MG/1
250 TABLET ORAL
Qty: 60 TABLET | DISCHARGE
Start: 2017-06-09 | End: 2017-06-13

## 2017-06-09 RX ORDER — CEPHALEXIN 500 MG/1
500 CAPSULE ORAL 4 TIMES DAILY
Qty: 28 CAPSULE | Refills: 0 | DISCHARGE
Start: 2017-06-09 | End: 2017-06-19

## 2017-06-09 RX ORDER — CLOPIDOGREL BISULFATE 75 MG/1
75 TABLET ORAL DAILY
Status: DISCONTINUED | OUTPATIENT
Start: 2017-06-09 | End: 2017-06-09 | Stop reason: HOSPADM

## 2017-06-09 RX ADMIN — METFORMIN HYDROCHLORIDE 1000 MG: 500 TABLET, FILM COATED ORAL at 09:07

## 2017-06-09 RX ADMIN — CEFAZOLIN SODIUM 2 G: 2 INJECTION, SOLUTION INTRAVENOUS at 00:10

## 2017-06-09 RX ADMIN — CEFAZOLIN SODIUM 2 G: 2 INJECTION, SOLUTION INTRAVENOUS at 09:07

## 2017-06-09 RX ADMIN — GLIPIZIDE 10 MG: 10 TABLET ORAL at 09:08

## 2017-06-09 RX ADMIN — COLCHICINE 0.6 MG: 0.6 TABLET, FILM COATED ORAL at 09:07

## 2017-06-09 RX ADMIN — DULOXETINE HYDROCHLORIDE 30 MG: 30 CAPSULE, DELAYED RELEASE ORAL at 09:07

## 2017-06-09 RX ADMIN — ASPIRIN 81 MG: 81 TABLET, COATED ORAL at 09:07

## 2017-06-09 RX ADMIN — CLOPIDOGREL BISULFATE 75 MG: 75 TABLET, FILM COATED ORAL at 10:41

## 2017-06-09 RX ADMIN — TAMSULOSIN HYDROCHLORIDE 0.4 MG: 0.4 CAPSULE ORAL at 09:07

## 2017-06-09 RX ADMIN — OMEPRAZOLE 40 MG: 20 CAPSULE, DELAYED RELEASE ORAL at 09:07

## 2017-06-09 RX ADMIN — ATORVASTATIN CALCIUM 80 MG: 40 TABLET, FILM COATED ORAL at 09:07

## 2017-06-09 RX ADMIN — PSYLLIUM HUSK 1 PACKET: 3.4 POWDER ORAL at 09:07

## 2017-06-09 RX ADMIN — METOPROLOL SUCCINATE 50 MG: 50 TABLET, EXTENDED RELEASE ORAL at 09:07

## 2017-06-09 RX ADMIN — IPRATROPIUM BROMIDE 2 SPRAY: 21 SPRAY NASAL at 09:12

## 2017-06-09 NOTE — CONSULTS
ORTHOPEDIC CONSULTATION      REQUESTING PHYSICIAN:  Faustino Aguilar MD      CHIEF COMPLAINT:  Right small finger PIP joint swelling, redness and pain, spontaneous onset.      HISTORY OF PRESENT ILLNESS:  We are asked to see Mr. Dustin Pearce in Orthopedic consultation at the request of Dr. Aguilar.  Mr. Pearce is an 89-year-old man who was admitted for a nonhealing right foot ulceration.  He has undergone amputation and is being followed by the Podiatry service.  He has been on IV antibiotics.  On 06/07, he had spontaneous onset of right small finger PIP joint swelling, pain and warmth.  No apparent trauma.  Gout was initially suspected and he was started on colchicine.  I saw him the next day and he felt things were getting better.  No neck pain, no paresthesias, no fever, chills or night sweats.  He is right-hand dominant.  No previous injury.      PAST MEDICAL HISTORY:  Otherwise notable for carotid artery disease and CVA, atrial fibrillation, osteopenia, nephrolithiasis, hypertension, hyperlipidemia, acute myocardial infarction, gastroesophageal reflux disease, DJD, type 2 diabetes with peripheral neuropathy, cardiomyopathy with an ejection fraction of 35-40%, BPH, essential hypertension and aortic root dilatation.      PAST SURGICAL HISTORY:  He has had rotator cuff repair bilaterally, laser lithotripsy, ureteral stent, hernia repair and cholecystectomy.      MEDICATIONS:  Include lisinopril, amitriptyline, Atrovent, doxycycline, Glucotrol, Toprol-XL, Prilosec, Flomax, aspirin, Norco, metformin, atorvastatin and clopidogrel.      ALLERGIES:  Oxycodone, which causes sweating, sulfa, tetracycline of unknown reaction.      HABITS:  From a habit standpoint, he is a former smoker, quitting in 1999, he had approximately a 30-pack-year.  He drinks 1 alcoholic beverage per week.      REVIEW OF SYSTEMS:  Otherwise unremarkable for any cardiac, pulmonary, gastrointestinal, metabolic, genitourinary, dermatologic or neurologic  problems.      FAMILY HISTORY:  His mother with breast cancer.  Father with heart failure.  No other contributing family history.      PHYSICAL EXAMINATION:   GENERAL:  Reveals a pleasant man in no acute distress.  He is alert and oriented x3.   VITAL SIGNS:  Blood pressure is 156/62, pulse 60, respiratory rate 16, temperature is 98.9, O2 sats 97% on room air.  Weight is 73 kg.   PSYCHIATRIC:  Mood is pleasant.   PULMONARY:  Respirations are unlabored.   NECK:  Range of motion is limited consistent with his age, but does not reproduce any symptoms.   EXTREMITIES:  The right hand reveals dorsal erythema and swelling in the PIP joint.  Range of motion is perhaps 75% of normal.  It is tender to palpation.  The contralateral extremity reveals intact skin, intact neurovascular status, full range of motion, no tenderness.   LYMPHATIC:  Negative.      IMAGING:  X-rays of the right hand show end-stage osteoarthrosis of the PIP joint.  No evidence of osteomyelitis or other destructive process.      DIAGNOSTIC IMPRESSION AND PLAN:  At this time is right small finger proximal interphalangeal joint inflammation.  I doubt infection.  This is either a local contusion, overuse or perhaps gout.  It appears he is improving on colchicine, so I think continuing that is reasonable.  I see no role for surgery here.  Will continue to follow him clinically.  If he plateaus or gets worse, there may be a role for further imaging and perhaps aspiration, but for now, I think this will pass with a tincture of time and the above treatment.      Thank you very much for allowing me to participate in Mr. Aguiar's care.         JAMES BLACK MD             D: 2017 12:55   T: 2017 13:36   MT: [id number]      Name:     SID AGUIAR   MRN:      [id number]        Account:       [id number]   :      1928           Consult Date:  2017      Document: [id number]       cc: Faustino Aguilar MD

## 2017-06-09 NOTE — PROGRESS NOTES
[facility]    Infectious Disease Progress Note    Date of Service (when I saw the patient): 6/5/17     Assessment & Plan   Dustin Pearce is a 89 year old male who was admitted on 5/31/2017.     Impression:   89 y.o male with DM, peripheral neuropathy.   Admitted with worsening in the non healing left foot ulcer.   MRI concerning for osteo.   S/P partial amputation.   OR cultures from tissue positive for MSSA, pathology no osteo in the proximal bone     Recommendations:   Continue on Ancef while admitted.   Since proximal bone negative to osteo, ok to d/c on oral keflex for 10 days when ready.     Anastasia Cee MD    Interval History   Afebrile, negative blood cultures   No new complaints, finger noted  S/p partial ray amputation    Physical Exam   Temp: 98.4  F (36.9  C) Temp src: Oral BP: 150/65 Pulse: 72 Heart Rate: 75 Resp: 16 SpO2: 95 % O2 Device: None (Room air)    Vitals:    06/04/17 0500 06/05/17 0713 06/06/17 0628   Weight: 75.1 kg (165 lb 9.1 oz) 77.1 kg (169 lb 15.6 oz) 62.3 kg (137 lb 5.6 oz)     Vital Signs with Ranges  Temp:  [97.4  F (36.3  C)-98.4  F (36.9  C)] 98.4  F (36.9  C)  Pulse:  [72-74] 72  Heart Rate:  [65-81] 75  Resp:  [16] 16  BP: (134-165)/(55-74) 150/65  SpO2:  [92 %-97 %] 95 %    Constitutional: Awake, alert, cooperative, no apparent distress  Lungs: Clear to auscultation bilaterally, no crackles or wheezing  Cardiovascular: Regular rate and rhythm, normal S1 and S2, and no murmur noted  Abdomen: Normal bowel sounds, soft, non-distended, non-tender  Skin: No rashes, no cyanosis, no edema  Other:    Medications        ceFAZolin  2 g Intravenous Q8H     colchicine  0.6 mg Oral Daily     sodium chloride (PF)  10 mL Intracatheter Q8H     metFORMIN  1,000 mg Oral BID w/meals     insulin glargine  4 Units Subcutaneous At Bedtime     insulin aspart  1-10 Units Subcutaneous TID AC     insulin aspart  1-7 Units Subcutaneous At Bedtime     aspirin  81 mg Oral Daily      atorvastatin (LIPITOR) tablet 80 mg  80 mg Oral Daily     DULoxetine  30 mg Oral Daily     glipiZIDE  10 mg Oral BID AC     ipratropium  2 spray Both Nostrils Q12H     metoprolol  50 mg Oral Daily     omeprazole  40 mg Oral Daily     psyllium  1 packet Oral Daily     tamsulosin  0.4 mg Oral Daily       Data   All microbiology laboratory data reviewed.  Recent Labs   Lab Test  06/09/17   0535  06/08/17   0613  06/07/17   0640   WBC  7.9  8.7  7.0   HGB  10.8*  10.9*  10.3*   HCT  32.8*  33.2*  32.0*   MCV  93  93  93   PLT  214  187  178     Recent Labs   Lab Test  06/08/17   0613  06/03/17   0655  06/02/17   0707   CR  0.62*  0.68  0.66     Recent Labs   Lab Test  05/31/17   1800   SED  43*     Recent Labs   Lab Test  06/06/17   1846  06/04/17   1027  06/04/17   1016  05/31/17   1930  05/31/17   1905   CULT  No growth  Culture negative monitoring continues  Moderate growth Staphylococcus aureus*  Culture negative monitoring continues  No growth  Culture negative monitoring continues  No growth  No growth

## 2017-06-09 NOTE — PLAN OF CARE
Problem: Goal Outcome Summary  Goal: Goal Outcome Summary  Outcome: Therapy, progress toward functional goals as expected  Patient plan for discharge: TCU  Current status: Pt requires SBA for supine to/from sit. Sit to/from stand with FWW and CGA with cues to maintain NWB on L LE. Gait training 15 ft x 1 with FWW and min/mod assist x 1 to maintain NWB L LE and to manage FWW. Pt requires frequent cues for maintaining NWB status on L LE.   Barriers to return to prior living situation: NWB L LE, assist for mobility, steps to enter/exit house not yet assessed.   Recommendations for discharge: TCU  Rationale for recommendations: Pt having difficulty maintaining NWB on L LE and needing assist for mobility. TCU to progress strength, balance, mobility, and stairs prior to returning home.    Pt discharging to TCU today.    PT goals not met.

## 2017-06-09 NOTE — PROGRESS NOTES
SWS PROGRESS NOTE:     I: HUBER faxed orders to [facility] and confirmed receipt with [place] in admissions.   P: Pt will DC to [facility] today via HE w/c at 1500.     ESAU Ramirez, LGSW [phone]

## 2017-06-09 NOTE — PROVIDER NOTIFICATION
MD Notification    Notified Person:  MD    Notified Persons Name: Lauren    Notification Date/Time: 6/9/17 12:21 p.m.    Notification Interaction:  Talked with Physician    Purpose of Notification: Per Bedside RN Dr. Copeland agreeable to d/c for today to TCU she will review and add podiatry follow up.  Asking for d/c orders for TCU    Orders Received:    Comments:

## 2017-06-09 NOTE — PROGRESS NOTES
Vascular Surgery Progress Note    S: Very comfortable    O:   Vitals:  BP  Min: 134/55  Max: 165/63  Temp  Av.8  F (36.6  C)  Min: 97.4  F (36.3  C)  Max: 98.1  F (36.7  C)  Pulse  Av.5  Min: 73  Max: 74  I/O last 3 completed shifts:  In: -   Out: 500 [Urine:500]    Physical Exam: Alert  Comfortable   Tolerating diet                            +3 Biphasic Doppler to left distal AT                             Dressing not removed on foot      Assessment/Plan: Adequate blood supply to foot for healing after Iliac stent and SFA angioplasty.   I will arrange vascular office follow-up.    With PAD consider ASA/Plavix.      Wm. Jennifer MD

## 2017-06-09 NOTE — PROGRESS NOTES
[facility]    Internal Medicine Hospitalist Progress Note  06/09/2017  I evaluated patient on the above date.    Faustino Aguilar Jr., MD  [phone] (p)  Text Page (7 am to 6 pm)      Assessment & Plan Dustin Pearce is a 89 year old male with history including DM2, HTN, CAD, CHF, PAF, CVA, PAD, DLD, GERD and BPH, who presented 5/31 with worsening L toe/foot ulcer/wound.     1.  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  * L foot x-ray 5/31 no acute findings. Started on Zosyn 5/31.  * MRI L foot 6/1 showed signs suspicious for osteomyelitis 5th MT head and 5th toe prox phalangeal base; 5th MT joint periarticular fluid signal intensity, possibly representing developing soft tissue abscess; dorsal and deeper soft tissue edema within the forefoot.  * Seen by Podiatry and Vascular.   * Underwent vascular w/u as below and underwent L MEG stenting and L SFA angioplasty on 6/2 and subseuqently felt that there was adequate blood supply for healing.  * S/p L partial 5th ray amputation 6/4/2017. Path negative for osteomyelitis.  * S/p revision I&D with resection of necrotic tissue down to and including deep fascia 6/6.   * Zosyn changed to Ancef 6/7.  * CRP trending down 6/9.  * Micro: BC's 5/31 NGTD. Tissue cultures L foot 6/4 negative. Abcess cultures L foot 6/4 growing mod growth MSSA. L foot cultures 6/6 pending.  - Continue IV cefazolin (started 6/7); plan Keflex on d/c for 10d per ID rec's.  - Monitor cultures.  - Post-op mgmt and further surgery per Podiatry.  - ID following, apprec help from Dr. Cee.     2. Peripheral arterial disease with nonhealing, infected wound - s/p L MEG stent and L SFA angioplasty on 6/2.  * H/o carotid disease.  * Seen by Vascular this stay.  * KRYSTINA 6/1 suggested significant arterial insufficiency.   * Carotid  US 6/2 showed 50-79& in R and occlusion of the L. LE angiogram 6/2 showed findings including significant stenosis found in left common iliac artery which was stented with a 10mm stent; L SFA stenosis noted and was angioplastied w 4 mm balloon.  - Continue ASA.  - F/u with Vascular 3 months.    3. Acute R 5th PIP joint swelling - unclear etiology, ?gout.  * Noted evening 6/7 with painful, swollen and warm R 5th PIP joint; no apparent trauma; gout suspected and started on colchicine - given 1.2 mg dose 6/7.  * X-ray 6/8 negative. Seen by Ortho 6/8, infection felt very unlikely.  * Improved 6/9.  - Change from colchicine to Naproxen 500 mg BID for 4 more days.  - Apprec Ortho help.     4. DM II with peripheral neuropathy - uncontrolled.  [PTA: glipizide 10 mg BID, metformin 1000 mg BID.]  * Hgb A1C 8.3 4/2017.  * Lantus started 6/3.   Recent Labs   Lab Test  06/09/17   0809  06/09/17   0535  06/09/17   0142  06/08/17   2145  06/08/17   1712  06/08/17   1201  06/08/17   0613   06/06/17   1355   06/01/17   0656   05/31/17   1800   GLC   --   162*   --    --    --    --   141*   --   140*   --   164*   --   249*   BGM  130*   --   141*  160*  169*  136*   --    < >   --    < >   --    < >   --     < > = values in this interval not displayed.   - Overall, anticipate improvement in glucose control with improvement/treatment of infection.  - Continue glipizide 10 mg BID.  - Try d/c Lantus tonight 6/9.  - Continue metformin 1000 mg BID.  - Continue ISS.  - After discharge, f/u with PCP regarding adjusting oral regimen; alternatively, consider d/c home with Lantus.    5. CAD, CHF.  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 Echo 1/2017 showed LVEF 35-40%. Nuc stress 3/2017 showed findings consistent with prior MI in the RCA/cirumflex distribution with mild ezekiel-infarct ischemia; EF 37% at rest and 28% post stress.  - Restart Plavix today 6/9  "(was being held for surgeries).  - Continue ASA, atorvastatin, metoprolol.  - Hold lisinopril for now.  - Monitor i/o's, daily wts.    6. Hypertension (benign essential).  [PTA: lisinopril 2.5 mg daily, metoprolol XL 50 mg daily.]  - Continue metoprolol XL 50 mg daily.  - Hold lisinopril for now, resume on d/c.    7. Cerebrovascular disease.  * H/o CVI.  - Continue ASA.  - Should be on AC, but defer further discussions outpt.     8. Afib  - Continue ASA, metoprolol.  - Should be on AC, but defer further discussions outpt.     9. Depression.  - Continue amitryptiline and duloxetine.      10. GERD.  - Continue omeprazole.     11. BPH.  - Continue Flomax.      Prophylaxis.  - PCD's.    CODE STATUS: FULL    Dispo.  - Pending above.  - D/C to TCU once OK with Podiatry.    Interval History   Doing well. No CP or SOB. Tolerating diet. R 5th finger better today.    -Data reviewed today: I reviewed all new labs and imaging over the last 24 hours. I personally reviewed no images or EKG's today.    Physical Exam   Heart Rate: 75, Blood pressure 150/65, pulse 72, temperature 98.4  F (36.9  C), temperature source Oral, resp. rate 16, height 1.651 m (5' 5\"), weight 62.3 kg (137 lb 5.6 oz), SpO2 95 %.  Vitals:    06/04/17 0500 06/05/17 0713 06/06/17 0628   Weight: 75.1 kg (165 lb 9.1 oz) 77.1 kg (169 lb 15.6 oz) 62.3 kg (137 lb 5.6 oz)     Vital Signs with Ranges  Temp:  [97.4  F (36.3  C)-98.4  F (36.9  C)] 98.4  F (36.9  C)  Pulse:  [72-74] 72  Heart Rate:  [65-81] 75  Resp:  [16] 16  BP: (134-165)/(55-74) 150/65  SpO2:  [92 %-97 %] 95 %  Patient Vitals for the past 24 hrs:   BP Temp Temp src Pulse Heart Rate Resp SpO2   06/09/17 0745 150/65 98.4  F (36.9  C) Oral 72 - 16 95 %   06/09/17 0541 144/74 97.4  F (36.3  C) Oral - 75 16 94 %   06/08/17 2327 150/60 97.9  F (36.6  C) Oral - 65 16 96 %   06/08/17 2023 134/55 97.9  F (36.6  C) Oral 73 68 16 92 %   06/08/17 1558 143/68 97.9  F (36.6  C) Oral 74 81 16 94 %   06/08/17 1100 " 165/63 98.1  F (36.7  C) Oral - 74 16 97 %     I/O's Last 24 hours  I/O last 3 completed shifts:  In: -   Out: 500 [Urine:500]    Constitutional: Alert, oriented, pleasant.  Respiratory: Clear, no crackles/wheezing.  Cardiovascular: Fairly regular, no m/r/g.  GI: Soft, nt, +BS.  Skin/Integumen: R 5th PIP joint less swollen, still slight erythema, not hot, improved ROM.  Other:        Data     Recent Labs  Lab 06/09/17  0535 06/08/17  0613 06/07/17  0640 06/06/17  1355 06/04/17  0830 06/03/17  0655   WBC 7.9 8.7 7.0  --   --   --    HGB 10.8* 10.9* 10.3*  --   --   --    MCV 93 93 93  --   --   --     187 178  --   --   --    NA  --  138  --   --   --   --    POTASSIUM  --  3.8  --  3.6 4.1  --    CHLORIDE  --  104  --   --   --   --    CO2  --  28  --   --   --   --    BUN  --  12  --   --   --   --    CR  --  0.62*  --   --   --  0.68   ANIONGAP  --  6  --   --   --   --    ALLISON  --  8.8  --   --   --   --    * 141*  --  140*  --   --      Recent Labs   Lab Test  06/09/17   0809  06/09/17   0535  06/09/17   0142  06/08/17   2145  06/08/17   1712  06/08/17   1201  06/08/17   0613   06/06/17   1355   06/01/17   0656   05/31/17   1800   GLC   --   162*   --    --    --    --   141*   --   140*   --   164*   --   249*   BGM  130*   --   141*  160*  169*  136*   --    < >   --    < >   --    < >   --     < > = values in this interval not displayed.         No results found for this or any previous visit (from the past 24 hour(s)).    Medications   All medications were reviewed.       ceFAZolin  2 g Intravenous Q8H     colchicine  0.6 mg Oral Daily     sodium chloride (PF)  10 mL Intracatheter Q8H     metFORMIN  1,000 mg Oral BID w/meals     insulin glargine  4 Units Subcutaneous At Bedtime     insulin aspart  1-10 Units Subcutaneous TID AC     insulin aspart  1-7 Units Subcutaneous At Bedtime     aspirin  81 mg Oral Daily     atorvastatin (LIPITOR) tablet 80 mg  80 mg Oral Daily     DULoxetine  30 mg Oral  Daily     glipiZIDE  10 mg Oral BID AC     ipratropium  2 spray Both Nostrils Q12H     metoprolol  50 mg Oral Daily     omeprazole  40 mg Oral Daily     psyllium  1 packet Oral Daily     tamsulosin  0.4 mg Oral Daily

## 2017-06-09 NOTE — PLAN OF CARE
Problem: Goal Outcome Summary  Goal: Goal Outcome Summary  Outcome: Improving  Patient A&O, forgetful at times. VSS. CMS intact. Dressing CDI. Patient able to transfer to the [facility] with the assist of 2, a gait belt, and walker. Denies pain. Declines ice to right small finger. Patient progress towards plan of care will continue to monitor

## 2017-06-09 NOTE — PROGRESS NOTES
Ortho  Right small finger PIP joint with spontaneous erythema, swelling, and pain starting yesterday. The finger may be improving.   Exam shows mild to moderate decrease rom of the right small, erythema and warmth.   Xray shows djd of the PIP joint of the right small finger.  A/p doubt infection. Plan observation and ice

## 2017-06-09 NOTE — DISCHARGE SUMMARY
"[facility]  Discharge Summary        Dustin Pearce MRN# [id number]   YOB: 1928 Age: 89 year old     Date of Admission: 5/31/2017  Date of Discharge: 6/9/2017  Admitting Physician: Blaze Torres MD  Discharge Physician: Faustino Aguilar MD     Primary Provider: Matt Morrissey  Primary Care Physician Phone Number: [phone]         Discharge Diagnoses:   1.  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  2. Peripheral arterial disease with nonhealing, infected wound - s/p L MEG stent and L SFA angioplasty on 6/2.  3. Acute R 5th PIP joint swelling - unclear etiology, ?gout.  4. DM II with peripheral neuropathy.        Other Chronic Medical Problems:      1. CAD, CHF.  2. Hypertension (benign essential).  3. Cerebrovascular disease.  4. Afib  5. Depression.  6. GERD.  7. BPH.       Allergies:         Allergies   Allergen Reactions     Oxycodone Other (See Comments)     \"TERRIBLE SWEATING\"     Sulfa Drugs      Tetracycline            Discharge Medications:        Current Discharge Medication List      START taking these medications    Details   naproxen (NAPROSYN) 250 MG tablet Take 1 tablet (250 mg) by mouth 3 times daily (with meals) for 4 days  Qty: 60 tablet    Associated Diagnoses: Swelling of limb      cephALEXin (KEFLEX) 500 MG capsule Take 1 capsule (500 mg) by mouth 4 times daily for 10 days  Qty: 28 capsule, Refills: 0    Associated Diagnoses: Left foot infection; Cellulitis of left lower extremity         CONTINUE these medications which have CHANGED    Details   HYDROcodone-acetaminophen (NORCO) 5-325 MG per tablet Take 1-2 tablets by mouth every 4 hours as needed for moderate to severe pain Reported on 3/7/2017  Refills: 0    Associated Diagnoses: Left foot infection         CONTINUE these medications " which have NOT CHANGED    Details   LISINOPRIL PO Take 2.5 mg by mouth daily      AMITRIPTYLINE HCL PO Take 25 mg by mouth nightly as needed for sleep      ipratropium (ATROVENT) 0.03 % spray Spray 2 sprays into both nostrils every 12 hours      DOXYCYCLINE HYCLATE PO Take 100 mg by mouth 2 times daily For 10 days      DULoxetine (CYMBALTA) 30 MG EC capsule Take 1 capsule (30 mg) by mouth daily  Qty: 90 capsule, Refills: 3    Associated Diagnoses: Polyneuropathy associated with underlying disease (H)      glipiZIDE (GLUCOTROL) 10 MG tablet Take 1 tablet (10 mg) by mouth 2 times daily (before meals)  Qty: 180 tablet, Refills: 3    Associated Diagnoses: Type 2 diabetes mellitus with diabetic peripheral angiopathy without gangrene, without long-term current use of insulin (H)      metoprolol (TOPROL-XL) 50 MG 24 hr tablet Take 1 tablet (50 mg) by mouth daily  Qty: 90 tablet, Refills: 3    Associated Diagnoses: Cardiomyopathy (H); Coronary artery disease involving native coronary artery of native heart without angina pectoris      omeprazole (PRILOSEC) 40 MG capsule Take 1 capsule (40 mg) by mouth daily Take 30-60 minutes before a meal.  Qty: 90 capsule, Refills: 3    Associated Diagnoses: Other acute gastritis without hemorrhage      tamsulosin (FLOMAX) 0.4 MG capsule Take 1 capsule (0.4 mg) by mouth daily  Qty: 90 capsule, Refills: 3    Associated Diagnoses: Benign non-nodular prostatic hyperplasia with lower urinary tract symptoms      aspirin EC 81 MG EC tablet Take 1 tablet (81 mg) by mouth daily  Qty: 30 tablet, Refills: 0    Associated Diagnoses: Transient cerebral ischemia, unspecified type      METFORMIN HCL PO Take 1,000 mg by mouth 2 times daily (with meals)      ATORVASTATIN CALCIUM PO Take 80 mg by mouth daily      Psyllium (METAMUCIL PO) Take 1 packet by mouth daily       Clopidogrel Bisulfate, [id number], (PLAVIX PO) Take 75 mg by mouth daily     Associated Diagnoses: Cough      blood glucose monitoring  (NO BRAND SPECIFIED) test strip Use to test blood sugar three times daily or as directed.  Qty: 300 each, Refills: 3    Comments: True Metrix strips or brand desired by insurance  Associated Diagnoses: Hypoglycemia      order for DME Equipment being ordered: True Matrix Blood Glucose meter.  Qty: 1 Device, Refills: 0    Associated Diagnoses: Type 2 diabetes mellitus without complication (H)                 Discharge Instructions and Follow-Up:      Follow-up Appointments     Follow Up and recommended labs and tests       Follow up with primary care provider, Matt Morrissey, within 7 days for   hospital follow- up.  The following labs/tests are recommended: BMP.   Surgical follow up per Podiatry.  Dr. Rodriguez's office will call for vascular f/up to be arranged.            Follow-up and recommended labs and tests        Follow up with Dr. Ann in 1 week from discharge from the   hospital.                          Consultations This Hospital Stay:      1. Infectious Disease.  2. Podiatry.  3. Vascular Surgery.  4. Orthopedic Surgery.        Admission History:      Please see the H&P by Dr. Torres on 5/31/2017 for complete details. Briefly, Dustin Pearce is a 89 year old male with history including DM2, HTN, CAD, CHF, PAF, CVA, PAD, DLD, GERD and BPH, who presented 5/31 with worsening L toe/foot ulcer/wound.        Problem Oriented Hospital Course:      1.  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  * L foot x-ray 5/31 no acute findings. Started on Zosyn 5/31.  * MRI L foot 6/1 showed signs suspicious for osteomyelitis 5th MT head and 5th toe prox phalangeal base; 5th MT joint periarticular fluid signal intensity, possibly representing developing soft tissue abscess; dorsal and deeper soft tissue edema within the forefoot.  * Seen by Podiatry and  Vascular.   * Underwent vascular w/u as below and underwent L MEG stenting and L SFA angioplasty on 6/2 and subseuqently felt that there was adequate blood supply for healing.  * S/p L partial 5th ray amputation 6/4/2017. Path negative for osteomyelitis.  * S/p revision I&D with resection of necrotic tissue down to and including deep fascia 6/6.   * Zosyn changed to Ancef 6/7.  * CRP trending down 6/9.  * Micro: BC's 5/31 NGTD. Tissue cultures L foot 6/4 negative. Abcess cultures L foot 6/4 growing mod growth MSSA. L foot cultures 6/6 NGTD.  - Plan Keflex on d/c for 10d per ID rec's.  - F/u with Podiatry.      2. Peripheral arterial disease with nonhealing, infected wound - s/p L MEG stent and L SFA angioplasty on 6/2.  * H/o carotid disease.  * Seen by Vascular this stay.  * KRYSTINA 6/1 suggested significant arterial insufficiency.   * Carotid US 6/2 showed 50-79& in R and occlusion of the L. LE angiogram 6/2 showed findings including significant stenosis found in left common iliac artery which was stented with a 10mm stent; L SFA stenosis noted and was angioplastied w 4 mm balloon.  - Continue ASA and Plavix.  - F/u with Vascular 3 months.     3. Acute R 5th PIP joint swelling - unclear etiology, ?gout.  * Noted evening 6/7 with painful, swollen and warm R 5th PIP joint; no apparent trauma; gout suspected and started on colchicine - given 1.2 mg dose 6/7.  * X-ray 6/8 negative. Seen by Ortho 6/8, infection felt very unlikely.  * Improved 6/9.  - Plan naproxen 250 mg TID through 6/12/2017.     4. DM II with peripheral neuropathy - uncontrolled.  [PTA: glipizide 10 mg BID, metformin 1000 mg BID.]  * Hgb A1C 8.3 4/2017.  * Lantus started 6/3.   * Lantus stopped 6/9.  - Overall, anticipate improvement in glucose control with improvement/treatment of infection.  - Continue glipizide 10 mg BID.  - Continue metformin 1000 mg BID.  - After discharge, f/u with PCP regarding adjusting oral regimen.     5. CAD, CHF.  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 Echo 1/2017 showed LVEF 35-40%. Nuc stress 3/2017 showed findings consistent with prior MI in the RCA/cirumflex distribution with mild ezekiel-infarct ischemia; EF 37% at rest and 28% post stress.  * Cardiac status stable throughout the stay.        Code Status:      Full Code        Pending Results:        Unresulted Labs Ordered in the Past 30 Days of this Admission     Date and Time Order Name Status Description    6/6/2017 1847 Anaerobic bacterial culture Preliminary     6/4/2017 1028 Anaerobic bacterial culture Preliminary     6/4/2017 1017 Anaerobic bacterial culture Preliminary                 Discharge Disposition:      Discharged to TCU.        Discharge Time:      Greater than 30 minutes.        Key Imaging Studies, Lab Findings and Procedures/Surgeries:        Results for orders placed or performed during the hospital encounter of 05/31/17   Foot XR, G/E 3 views, left    Narrative    XR FOOT LT G/E 3 VW 5/31/2017 6:40 PM    COMPARISON: None.    HISTORY: Left foot pain.      Impression    IMPRESSION: Degenerative changes at the left third proximal  interphalangeal joint. Osteopenia is noted in the left foot, but no  displaced fractures are seen.    CONNOR BURNS   MR Foot Left w/o & w Contrast    Narrative    MR FOOT LEFT WITHOUT AND WITH CONTRAST   6/1/2017 8:19 AM     HISTORY: Diabetic foot ulcer and cellulitis, concern for  osteomyelitis.    DOSE: 7 mL Gadavist.    COMPARISON: 5/31/2017 radiographs.    FINDINGS: There is a suspected soft tissue wound along the plantar  aspect of the fifth metatarsophalangeal joint. Fluid/edema is noted  about the fifth metatarsophalangeal joint capsule. Ill-defined marrow  edema is also noted within the fifth metatarsal head and fifth toe  proximal phalangeal base without apparent osseous destruction.  Ill-defined subcutaneous edema is noted along the  dorsum of the foot  as well as within the deeper soft tissues of the forefoot. No  rim-enhancing soft tissue fluid collection or abscess is visible  within the forefoot. Marrow signal within the forefoot is otherwise  within normal limits.      Impression    IMPRESSION:  1. Suspected soft tissue wound along the plantar aspect of the fifth  metatarsophalangeal joint.  2. Marrow edema within the fifth metatarsal head and fifth toe  proximal phalangeal base. Given proximity to soft tissue wound, this  is highly suspicious for osteomyelitis.  3. Fifth metatarsophalangeal joint periarticular fluid signal  intensity. While there is no rim enhancement, this could represent  developing soft tissue abscess adjacent to the joint capsule.  4. Dorsal and deeper soft tissue edema within the forefoot. It should  be noted that MR cannot distinguish reactive edema from cellulitis.    VERO DOYLE MD   US KRYSTINA Doppler No Exercise    Narrative    US KRYSTINA DOPPLER NO EXERCISE  6/1/2017 2:19 PM     HISTORY: L foot ulceration, infection    COMPARISON: None.    FINDINGS:   The resting KRYSTINA on the left lower extremity is 0.65. A resting KRYSTINA in  the right lower extremity could not be obtained due to vessel  noncompressibility.    Waveform analysis significantly dampened distal tibial waveforms.      Impression    IMPRESSION: Findings would indicate significant lower extremity  arterial insufficiency. In the setting of a nonhealing left foot  ulcer, further evaluation with an angiogram could be performed.      LIDIA YARBROUGH MD   US Lower Extremity Venous Mapping Bilateral    Narrative    ULTRASOUND BILATERAL LOWER EXTREMITY VENOUS MAPPING   6/2/2017 12:20  PM     HISTORY: Left toe ulcer.    COMPARISON: None.      Impression    IMPRESSION: Bilateral great saphenous veins are patent from the groin  to the ankle. On the right, the diameter gradually tapers from 4.9 mm  near the saphenofemoral junction to 1 mm in the mid calf. On the  left,  the diameter gradually tapers from 4.5 mm near the saphenofemoral  junction to 2.5 mm in the mid calf.    MICHAEL CHURCHILL MD   US Carotid Bilateral    Narrative    ULTRASOUND CAROTID BILATERAL  6/2/2017  2:57 PM    HISTORY: Carotid bruit.    FINDINGS:  Right Carotid: There is dense hyperechoic plaque throughout the right  carotid system.  Peak systolic and diastolic velocities in the  internal carotid artery are 221/32 cm/sec.  ICA/CCA ratio is 2.8.   Flow is antegrade in the vertebral artery.      Left Carotid: There is shadowing plaque throughout the left carotid  system, particularly at the bifurcation. The left mid and distal  internal carotid artery are occluded.   Flow is antegrade in the  vertebral artery.      SUMMARY:    1. Relative to the diameter of the normal internal carotid artery,  there is 50-79% stenosis in the right proximal ICA.  2. 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    MICHAEL CHURCHILL MD   IR Lower Extremity Angiogram Left    Narrative    IR LOWER EXTREMITY ANGIOGRAM LEFT  6/2/2017 2:46 PM     HISTORY: 89-year-old man with a nonhealing wound in the left foot.    COMPARISON: Ankle-brachial indices dated 6/1/2017.    DESCRIPTION OF PROCEDURE: After obtaining informed consent, the  patient was placed in a supine position on the fluoroscopy table. Both  groins were prepped and draped in the usual sterile manner. 1%  lidocaine was injected for local anesthesia. Ultrasound was used to  evaluate and document patency of the right common femoral artery.  Under sterile ultrasound guidance, access into the right common  femoral artery was obtained. An image was saved for documentation. An  Omni Flush catheter was passed into the artery into the distal  abdominal aorta for a pelvic angiogram.    Under ultrasound guidance, access into the proximal left superficial  femoral artery was obtained. A 6 [place] vascular sheath was placed. A  left lower extremity angiogram was performed through  this sheath.    FINDINGS:   Pelvic angiogram: Severe stenosis in the proximal left common iliac  artery. Moderate stenoses in the mid distal left common iliac artery.    Left lower extremity angiogram:  Common femoral artery: No significant stenosis.  Superficial femoral artery: Moderate to severe stenoses near the  adductor canal.  Popliteal artery: No significant stenosis.  Tibial arteries: Single-vessel runoff via the anterior tibial artery.  The anterior tibial artery occludes at the ankle. It gives rise to  collaterals which reconstitute the dorsalis pedis artery and posterior  tibial artery branches in the left foot.    Intervention: A BUSTER 1 catheter was able to cross the stenoses in the  left common iliac artery. A 7 Thai sheath was then placed on the  left side and used to cross the stenoses in the left common iliac  artery. These were stented with a 9 mm x 57 mm balloon expandable  stent. The stent was then postdilated with a 10 mm balloon. Follow-up  angiogram showed improvement in luminal diameter.    Next, a 6 Thai crossover sheath was maneuvered from the right  femoral puncture site over the iliac bifurcation into the left common  iliac artery. A Sanjeev-Cross catheter was used to pass the stenoses in  the left superficial femoral artery. These were angioplastied with a 4  mm balloon. Follow-up angiogram showed improvement in luminal  diameter.    The crossover sheath was then pulled back and the common iliac artery  stent was again angioplastied using a 10 mm balloon to make certain  that it had not infolded. Follow-up angiogram showed good flow across  the stent.    Both sheaths were pulled. Pressure was held at the puncture sites for  10 minutes with good hemostasis.    The patient tolerated the procedure well. There were no immediate  postprocedure complications. The patient's vital signs were monitored  by radiology nursing staff under my supervision and remained stable  throughout the  study.    MEDICATIONS: 1 mg Versed, 50 mg fentanyl    Sedation time: 93 minutes    Fluoroscopy time: 22.3 minutes    Total fluoroscopy dose: 394 mGy    Contrast: 100 mL Visipaque      Impression    IMPRESSION: Angiography showed significant stenoses in the left common  iliac artery and left distal superficial femoral artery. The common  iliac artery stenoses were stented and the superficial femoral artery  stenoses were angioplastied. There is improved luminal diameter and  flow across the intervened upon segments. Patient has single-vessel  runoff via the anterior tibial artery which occludes at the ankle and  reconstitutes the DP and posterior tibial branches in the foot.    LIDIA YARBROUGH MD     Surgery 6/4/2017:  S/p L partial 5th ray amputation.    XR Foot Left G/E 3 Views    Narrative    LEFT FOOT TWO VIEWS  6/5/2017 10:10 AM     HISTORY:  Postop.    COMPARISON: 5/31/2017.      Impression    IMPRESSION: The fifth toe and the head and distal shaft of the fifth  metatarsal have been resected. Arterial calcifications.    LEATHA MENJIVAR MD     Surgery 6/6/2017:  Revision irrigation and debridement with resection of necrotic tissue down to and including deep fascia.     XR Foot Left G/E 3 Views    Narrative    XR FOOT LT G/E 3 VW 6/7/2017 11:52 AM    HISTORY: Postop check.    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 No new  abnormality.      Impression    IMPRESSION: Stable postoperative change.    JAMES ARGUETA MD   XR Finger Right G/E 2 Views    Narrative    XR FINGER RT G/E 2 VW 6/8/2017 9:26 AM    HISTORY: Fifth PIP joint swelling.    COMPARISON: None.    FINDINGS: No acute fracture or malalignment. 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      Impression    IMPRESSION: Degenerative change without fracture.    JAMES ARGUETA  MD

## 2017-06-09 NOTE — PLAN OF CARE
Problem: Goal Outcome Summary  Goal: Goal Outcome Summary  Outcome: Improving  Transfers to BSC with 1 assist and walker. Had to change it to stand and move the chair behind him since he does not maintain NWB status to left foot. Redness and swelling improving to right 5th finger. Eager for discharge soon.

## 2017-06-10 LAB — INTERPRETATION ECG - MUSE: NORMAL

## 2017-06-11 LAB
BACTERIA SPEC CULT: NORMAL
BACTERIA SPEC CULT: NORMAL
Lab: NORMAL
Lab: NORMAL
MICRO REPORT STATUS: NORMAL
MICRO REPORT STATUS: NORMAL
SPECIMEN SOURCE: NORMAL
SPECIMEN SOURCE: NORMAL

## 2017-06-12 ENCOUNTER — NURSING HOME VISIT (OUTPATIENT)
Dept: GERIATRICS | Facility: CLINIC | Age: 82
End: 2017-06-12
Payer: MEDICARE

## 2017-06-12 ENCOUNTER — CARE COORDINATION (OUTPATIENT)
Dept: CARE COORDINATION | Facility: CLINIC | Age: 82
End: 2017-06-12

## 2017-06-12 ENCOUNTER — CARE COORDINATION (OUTPATIENT)
Dept: FAMILY MEDICINE | Facility: CLINIC | Age: 82
End: 2017-06-12

## 2017-06-12 VITALS
SYSTOLIC BLOOD PRESSURE: 119 MMHG | BODY MASS INDEX: 26.89 KG/M2 | TEMPERATURE: 97.6 F | OXYGEN SATURATION: 95 % | RESPIRATION RATE: 20 BRPM | WEIGHT: 161.6 LBS | DIASTOLIC BLOOD PRESSURE: 71 MMHG | HEART RATE: 69 BPM

## 2017-06-12 DIAGNOSIS — I10 BENIGN ESSENTIAL HYPERTENSION: ICD-10-CM

## 2017-06-12 DIAGNOSIS — G31.84 MILD COGNITIVE IMPAIRMENT: ICD-10-CM

## 2017-06-12 DIAGNOSIS — R53.81 PHYSICAL DECONDITIONING: ICD-10-CM

## 2017-06-12 DIAGNOSIS — L97.529 DIABETIC ULCER OF LEFT FOOT ASSOCIATED WITH DIABETES MELLITUS DUE TO UNDERLYING CONDITION (H): Primary | ICD-10-CM

## 2017-06-12 DIAGNOSIS — I73.9 PERIPHERAL ARTERIAL DISEASE (H): ICD-10-CM

## 2017-06-12 DIAGNOSIS — N40.1 BENIGN NON-NODULAR PROSTATIC HYPERPLASIA WITH LOWER URINARY TRACT SYMPTOMS: ICD-10-CM

## 2017-06-12 DIAGNOSIS — E08.621 DIABETIC ULCER OF LEFT FOOT ASSOCIATED WITH DIABETES MELLITUS DUE TO UNDERLYING CONDITION (H): Primary | ICD-10-CM

## 2017-06-12 DIAGNOSIS — E11.42 DM TYPE 2 WITH DIABETIC PERIPHERAL NEUROPATHY (H): ICD-10-CM

## 2017-06-12 PROBLEM — G62.9 PERIPHERAL NEUROPATHY: Status: RESOLVED | Noted: 2017-04-21 | Resolved: 2017-06-12

## 2017-06-12 PROBLEM — G63 POLYNEUROPATHY ASSOCIATED WITH UNDERLYING DISEASE (H): Status: ACTIVE | Noted: 2017-06-12

## 2017-06-12 PROBLEM — M54.50 ACUTE RIGHT-SIDED LOW BACK PAIN WITHOUT SCIATICA: Status: RESOLVED | Noted: 2017-01-05 | Resolved: 2017-06-12

## 2017-06-12 PROBLEM — G63 POLYNEUROPATHY ASSOCIATED WITH UNDERLYING DISEASE (H): Status: RESOLVED | Noted: 2017-06-12 | Resolved: 2017-06-12

## 2017-06-12 PROCEDURE — 99207 ZZC CDG-CORRECTLY CODED, REVIEWED AND AGREE: CPT | Performed by: NURSE PRACTITIONER

## 2017-06-12 PROCEDURE — 99310 SBSQ NF CARE HIGH MDM 45: CPT | Performed by: NURSE PRACTITIONER

## 2017-06-12 RX ORDER — DOXYCYCLINE HYCLATE 100 MG
100 TABLET ORAL 2 TIMES DAILY
COMMUNITY
End: 2017-06-21

## 2017-06-12 NOTE — PROGRESS NOTES
Care Coordination Transition Communication    Referral Source: CTS    Clinical Data: Patient was hospitalized at [facility]  from 5/31/2017 to 6/9/2017 with diagnosis of :  1.  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  2. Peripheral arterial disease with nonhealing, infected wound - s/p L MEG stent and L SFA angioplasty on 6/2.  3. Acute R 5th PIP joint swelling - unclear etiology, ?gout.  4. DM II with peripheral neuropathy.        Transition to Facility:   Facility Name: [facility]    Contact name and phone number/fax: CC left Ronda Guerra/ [facility] RN CC contact information on [place] [facility]  #  [phone]    Plan: RN/SW Care Coordinator will await notification from facility staff informing RN/SW Care Coordinator of patient's discharge plans/needs. RN/SW Care Coordinator will review chart and outreach to facility staff every 4 weeks and as needed.     Farnaz Eddy RN / Clinical Care Coordinator     [address] [address]  [email or web address]  Office :  [phone] / Fax :  [phone]

## 2017-06-12 NOTE — PROGRESS NOTES
"[place] [facility]  PRIMARY CARE PROVIDER AND CLINIC:  Matt Morrissey [facility] [address] / [facility] [address]  Chief Complaint   Patient presents with     Hospital F/U       HPI:    Dustin Pearce is a 89 year old  (1/31/1928),[facility] 06[facility].  Admitted to this facility for  rehab, medical management and nursing care.     Hospital Course:  The patient is an 89-year-old type 2 diabetic man who has been battling a left foot ulcer for the past 6 months under the care of Dr. Arrieta, podiatrist.  Since 3-4 days ago, the wife noticed some increased purulent drainage from the foot and then yesterday noted increased redness, swelling and warmth.  The patient also had a significant increase in pain in his foot.  Of note, he does have diabetic peripheral neuropathy.  He was seen by Dr. Arrieta in the [facility] earlier today and was referred to the ED for ongoing evaluation and treatment.  On review, he has no fevers, sweats, chills, nausea, vomiting, shortness of breath, chest pain, abdominal pain, diarrhea.     Current issues are:    1.  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  2. Peripheral arterial disease with nonhealing, infected wound - s/p L MEG stent and L SFA angioplasty on 6/2.  3. Acute R 5th PIP joint swelling - unclear etiology, ?gout.  4. DM II with peripheral neuropathy.    No CP, SOB, Cough, dizziness, fevers, chills, HA, N/V, dysuria or Bowel Abnormalities. Appetite is good.  No pain. He says had bad macular degeneration and only sees out of right eye.   Sees   \"you have a blue shirt and dark hair\"      CODE STATUS/ADVANCE DIRECTIVES DISCUSSION:   CPR/Full code   Patient's living condition: " lives with friend.    ALLERGIES:Oxycodone; Sulfa drugs; and Tetracycline  PAST MEDICAL HISTORY:  has a past medical history of Aortic root dilatation (H); Benign essential hypertension (1/6/2017); Benign non-nodular prostatic hyperplasia with lower urinary tract symptoms (10/20/2016); CAD (coronary artery disease); Cardiomyopathy (H); Carotid artery stenosis (10/20/2016); Carotid occlusion, left; Coronary artery disease involving native coronary artery of native heart without angina pectoris (10/20/2016); Diabetes mellitus (H); DJD (degenerative joint disease); Gastro-oesophageal reflux disease; Gastroesophageal reflux disease without esophagitis (10/20/2016); Heart attack (H); Hyperlipidemia; Hypertension; Mild cognitive impairment (10/20/2016); Nephrolithiasis; Osteopenia; Paroxysmal atrial fibrillation (H); PONV (postoperative nausea and vomiting); and Unspecified cerebral artery occlusion with cerebral infarction. He also has no past medical history of Difficult intubation or Malignant hyperthermia.  PAST SURGICAL HISTORY:  has a past surgical history that includes Cholecystectomy; hernia repair; Abdomen surgery; Laser holmium lithotripsy ureter(s), insert stent, combined (3/2/2012); rotator cuff repair rt/lt; Esophagoscopy, gastroscopy, duodenoscopy (EGD), combined (N/A, 12/26/2016); UGI ENDOSCOPY W EUS (N/A, 12/29/2016); Amputate foot (Left, 6/4/2017); and Incision and drainage foot, combined (Left, 6/6/2017).  FAMILY HISTORY: family history includes Breast Cancer in his mother; Heart Failure (age of onset: 81) in his father.  SOCIAL HISTORY:  reports that he quit smoking about 18 years ago. His smoking use included Cigarettes. He has a 30.00 pack-year smoking history. He has never used smokeless tobacco. He reports that he drinks about 4.2 oz of alcohol per week  He reports that he does not use illicit drugs.    Post Discharge Medication Reconciliation Status: discharge medications reconciled and changed, per  note/orders (see AVS).  Current Outpatient Prescriptions   Medication Sig Dispense Refill     doxycycline (VIBRA-TABS) 100 MG tablet Take 100 mg by mouth 2 times daily Give 1 tablet by mouth two times a day for As indicated for 10 Days .  Ends 6/19       HYDROcodone-acetaminophen (NORCO) 5-325 MG per tablet Take 1-2 tablets by mouth every 4 hours as needed for moderate to severe pain Reported on 3/7/2017  0     naproxen (NAPROSYN) 250 MG tablet Take 1 tablet (250 mg) by mouth 3 times daily (with meals) for 4 days 60 tablet      cephALEXin (KEFLEX) 500 MG capsule Take 1 capsule (500 mg) by mouth 4 times daily for 10 days (Patient taking differently: Take 500 mg by mouth 4 times daily Last dose on 6/19) 28 capsule 0     LISINOPRIL PO Take 2.5 mg by mouth daily Hold if SBP is less than 110. Call NP if held 2x in a row or symptomatic       AMITRIPTYLINE HCL PO Take 25 mg by mouth nightly as needed for sleep       ipratropium (ATROVENT) 0.03 % spray Spray 2 sprays into both nostrils every 12 hours       DULoxetine (CYMBALTA) 30 MG EC capsule Take 1 capsule (30 mg) by mouth daily 90 capsule 3     glipiZIDE (GLUCOTROL) 10 MG tablet Take 1 tablet (10 mg) by mouth 2 times daily (before meals) 180 tablet 3     metoprolol (TOPROL-XL) 50 MG 24 hr tablet Take 1 tablet (50 mg) by mouth daily (Patient taking differently: Take 50 mg by mouth daily Hold if SBP is less 100 and HR is less than 55. Call NP if held 2x in a row or symptomatic) 90 tablet 3     omeprazole (PRILOSEC) 40 MG capsule Take 1 capsule (40 mg) by mouth daily Take 30-60 minutes before a meal. 90 capsule 3     tamsulosin (FLOMAX) 0.4 MG capsule Take 1 capsule (0.4 mg) by mouth daily 90 capsule 3     aspirin EC 81 MG EC tablet Take 1 tablet (81 mg) by mouth daily 30 tablet 0     METFORMIN HCL PO Take 1,000 mg by mouth 2 times daily (with meals)       ATORVASTATIN CALCIUM PO Take 80 mg by mouth daily       Psyllium (METAMUCIL PO) Take 1 packet by mouth daily         Clopidogrel Bisulfate, [id number], (PLAVIX PO) Take 75 mg by mouth daily        blood glucose monitoring (NO BRAND SPECIFIED) test strip Use to test blood sugar three times daily or as directed. 300 each 3     order for DME Equipment being ordered: True Matrix Blood Glucose meter. 1 Device 0       ROS:  10 point ROS of systems including Constitutional, Eyes, Respiratory, Cardiovascular, Gastroenterology, Genitourinary, Integumentary, Muscularskeletal, Psychiatric were all negative except for pertinent positives noted in my HPI.    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  NECK:  No adenopathy,masses or thyromegaly  RESP:  respiratory effort and palpation of chest normal, lungs clear to auscultation , no respiratory distress  CV:  Palpation and auscultation of heart done , regular rate and rhythm, no murmur, rub, or gallop, no edema, +1 pedal pulses right foot. Left foot has drsg.  ABDOMEN:  normal bowel sounds, soft, nontender, no hepatosplenomegaly or other masses  M/S:   FERNANDEZ equally, left foot has drsg up past ankle. toes are warm, pink and has feeling but decreased.  SKIN:  Inspection of skin and subcutaneous tissue baseline, left foot drsg is dry and intact  NEURO:   Cranial nerves 2-12 are normal tested and grossly at patient's baseline  PSYCH:  oriented X 3, ?memory impaired , affect and mood normal    Lab/Diagnostic data:  CBC RESULTS:   Recent Labs   Lab Test  06/09/17   0535  06/08/17   0613   WBC  7.9  8.7   RBC  3.53*  3.58*   HGB  10.8*  10.9*   HCT  32.8*  33.2*   MCV  93  93   MCH  30.6  30.4    MCHC  32.9  32.8   RDW  13.9  13.6   PLT  214  187       Last Basic Metabolic Panel:  Recent Labs   Lab Test  06/09/17   0535  06/08/17   0613  06/06/17   1355   06/03/17   0655   06/01/17   0656   NA   --   138   --    --    --    --   141   POTASSIUM   --   3.8  3.6   < >   --    --   4.6   CHLORIDE   --   104   --    --    --    --   105   ALLISON   --   8.8   --    --    --    --   8.7   CO2   --   28   --    --    --    --   33*   BUN   --   12   --    --    --    --   12   CR   --   0.62*   --    --   0.68   < >  0.64*   GLC  162*  141*  140*   --    --    --   164*    < > = values in this interval not displayed.       TSH   Date Value Ref Range Status   01/20/2017 3.12 0.40 - 4.00 mU/L Final   10/20/2016 4.63 (H) 0.40 - 4.00 mU/L Final       Lab Results   Component Value Date    A1C 8.3 04/21/2017    A1C 7.9 12/26/2016     ASSESSMENT / PLAN:  (E08.621,  L97.529) Diabetic ulcer of left foot associated w/diabetes mellitus due to underlying condition (H)  (primary encounter diagnosis)   (I73.9) Peripheral arterial disease (H)   (E11.42) 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)  Comment: no drsg changes, awaiting call from Dr Rodriguez's office as to when f/U appt is. accucheck mostly mid to high 100's , occas > 200. Cont same POC for now, monitor. Consider insulin for tighter control and wound healing??.  Check Hgb and CRP in am.    (I10) Benign essential hypertension  Comment: reasonable controlled, cont same POC and monitor. Check BMP in am    (R53.81) Physical deconditioning  Comment: PT and OT    (G31.84) Mild cognitive impairment  Comment: OT to eval, Rounds in am.    (N40.1) Benign non-nodular prostatic hyperplasia with lower urinary tract symptoms  Comment: cont Flomax, no issues, monitor.        Information reviewed:  Medications, vital signs, orders, nursing notes, problem list, hospital information. Total time spent with patient visit was 60 minutes including patient visit, review of past records  and staff. Greater than 50% of total time spent with counseling and coordinating care.    Electronically signed by:  ASPEN Montes CNP

## 2017-06-13 ENCOUNTER — TRANSFERRED RECORDS (OUTPATIENT)
Dept: HEALTH INFORMATION MANAGEMENT | Facility: CLINIC | Age: 82
End: 2017-06-13

## 2017-06-13 ENCOUNTER — NURSING HOME VISIT (OUTPATIENT)
Dept: GERIATRICS | Facility: CLINIC | Age: 82
End: 2017-06-13
Payer: MEDICARE

## 2017-06-13 VITALS
OXYGEN SATURATION: 94 % | DIASTOLIC BLOOD PRESSURE: 85 MMHG | RESPIRATION RATE: 18 BRPM | SYSTOLIC BLOOD PRESSURE: 135 MMHG | HEART RATE: 77 BPM | BODY MASS INDEX: 26.89 KG/M2 | WEIGHT: 161.6 LBS | TEMPERATURE: 97 F

## 2017-06-13 DIAGNOSIS — I10 BENIGN ESSENTIAL HYPERTENSION: ICD-10-CM

## 2017-06-13 DIAGNOSIS — L97.529 DIABETIC ULCER OF LEFT FOOT ASSOCIATED WITH DIABETES MELLITUS DUE TO UNDERLYING CONDITION (H): Primary | ICD-10-CM

## 2017-06-13 DIAGNOSIS — E11.42 DM TYPE 2 WITH DIABETIC PERIPHERAL NEUROPATHY (H): ICD-10-CM

## 2017-06-13 DIAGNOSIS — D62 ANEMIA DUE TO BLOOD LOSS, ACUTE: ICD-10-CM

## 2017-06-13 DIAGNOSIS — E08.621 DIABETIC ULCER OF LEFT FOOT ASSOCIATED WITH DIABETES MELLITUS DUE TO UNDERLYING CONDITION (H): Primary | ICD-10-CM

## 2017-06-13 LAB
ANION GAP SERPL CALCULATED.3IONS-SCNC: 7 MMOL/L (ref 5–18)
BACTERIA SPEC CULT: NORMAL
BUN SERPL-MCNC: 20 MG/DL (ref 8–28)
CALCIUM SERPL-MCNC: 9.4 MG/DL (ref 8.5–10.5)
CHLORIDE SERPLBLD-SCNC: 107 MMOL/L (ref 98–107)
CO2 SERPL-SCNC: 28 MMOL/L (ref 22–31)
CREAT SERPL-MCNC: 0.68 MG/DL (ref 0.7–1.3)
GFR SERPL CREATININE-BSD FRML MDRD: >60 ML/MIN/1.73M2
GLUCOSE SERPL-MCNC: 108 MG/DL (ref 70–125)
HEMOGLOBIN: 10.7 G/DL (ref 14–18)
Lab: NORMAL
MICRO REPORT STATUS: NORMAL
POTASSIUM SERPL-SCNC: 4.6 MMOL/L (ref 3.5–5)
SODIUM SERPL-SCNC: 142 MMOL/L (ref 136–145)
SPECIMEN SOURCE: NORMAL

## 2017-06-13 PROCEDURE — 99307 SBSQ NF CARE SF MDM 10: CPT | Performed by: NURSE PRACTITIONER

## 2017-06-13 NOTE — PROGRESS NOTES
CC: Lab recheck of CBC, BMP and CRP    HPI:    Dustin Pearce is a 89 year old  (1/31/1928), who is being seen today for an episodic care visit at [facility]. Today's concern: lab recheck of see above        OBJECTIVE: A & O x 3, NAD.   No focal neurological deficits.  Sitting in chair.    /85  Pulse 77  Temp 97  F (36.1  C)  Resp 18  Wt 161 lb 9.6 oz (73.3 kg)  SpO2 94%  BMI 26.89 kg/m2    LABS: Last Basic Metabolic Panel:  Lab Results   Component Value Date     06/13/2017      Lab Results   Component Value Date    POTASSIUM 4.6 06/13/2017     Lab Results   Component Value Date    CHLORIDE 107 06/13/2017     Lab Results   Component Value Date    ALLISON 9.4 06/13/2017     Lab Results   Component Value Date    CO2 28 06/13/2017     Lab Results   Component Value Date    BUN 20 06/13/2017     Lab Results   Component Value Date    CR 0.68 06/13/2017     Lab Results   Component Value Date     06/13/2017     Lab Results   Component Value Date    WBC 7.9 06/09/2017     Lab Results   Component Value Date    RBC 3.53 06/09/2017     Lab Results   Component Value Date    HGB 10.7 06/13/2017     Lab Results      Lab Results   Component Value Date     06/09/2017     C-reactive protein: 0.9    ASSESSMENT / PLAN:  (E08.621,  L97.529) Diabetic ulcer of left foot associated with diabetes mellitus due to underlying condition (H)  (primary encounter diagnosis)   (E11.42) 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)  Comment: crp back to normal,     (D62) Anemia due to blood loss, acute  Comment: Hgb 10.7, check prn    (I10) Benign essential hypertension  Comment: BMP WNL, check prn        Christina Walter RN, CNP

## 2017-06-14 ENCOUNTER — NURSING HOME VISIT (OUTPATIENT)
Dept: GERIATRICS | Facility: CLINIC | Age: 82
End: 2017-06-14
Payer: MEDICARE

## 2017-06-14 DIAGNOSIS — Z53.9 ERRONEOUS ENCOUNTER--DISREGARD: Primary | ICD-10-CM

## 2017-06-14 NOTE — MR AVS SNAPSHOT
After Visit Summary   6/14/2017    Dustin Pearce    MRN: [id number]           Patient Information     Date Of Birth          1/31/1928        Visit Information        Provider Department      6/14/2017 3:10 PM Jose Guadalupe Rodriguez MD [facility] Trans[facility]        Today's Diagnoses     ERRONEOUS ENCOUNTER--DISREGARD    -  1       Follow-ups after your visit        Your next 10 appointments already scheduled     Feb 15, 2018  6:00 PM CST   Office Visit with Matt Morrissey MD   [facility] ([facility])    [address] [facility] [address] [phone]           Bring a current list of meds and any records pertaining to this visit. For Physicals, please bring immunization records and any forms needing to be filled out. Please arrive 10 minutes early to complete paperwork.            Apr 19, 2018 11:00 AM CDT   US CAROTID RIGHT with [id number]   [facility] ([facility])    [address]  [facility] [address]   [phone]           Please bring a list of your medicines (including vitamins, minerals and over-the-counter drugs). Also, tell your doctor about any allergies you may have. Wear comfortable clothes and leave your valuables at home.  You do not need to do anything special to prepare for your exam.  Please call the Imaging Department at your exam site with any questions.            Apr 19, 2018 11:45 AM CDT   Return Visit with Roland Rodriguez MD   [facility] ([facility])    [address]  [facility] [address] [phone]              Who to contact     If you have questions or need follow up information about today's clinic visit or your schedule please contact G[facility]AT[facility] directly at [phone].  Normal or non-critical lab and imaging results will be communicated to you by  "MyChart, letter or phone within 4 business days after the clinic has received the results. If you do not hear from us within 7 days, please contact the clinic through [employer] or phone. If you have a critical or abnormal lab result, we will notify you by phone as soon as possible.  Submit refill requests through [employer] or call your pharmacy and they will forward the refill request to us. Please allow 3 business days for your refill to be completed.          Additional Information About Your Visit        [employer] Information     [employer] lets you send messages to your doctor, view your test results, renew your prescriptions, schedule appointments and more. To sign up, go to [email or web address] . Click on \"Log in\" on the left side of the screen, which will take you to the Welcome page. Then click on \"Sign up Now\" on the right side of the page.     You will be asked to enter the access code listed below, as well as some personal information. Please follow the directions to create your username and password.     Your access code is: [email or web address]  Expires: 2018 11:54 AM     Your access code will  in 90 days. If you need help or a new code, please call your [place] clinic or [phone].        Care EveryWhere ID     This is your Care EveryWhere ID. This could be used by other organizations to access your [place] medical records  [id number]         Blood Pressure from Last 3 Encounters:   18 101/41   18 123/63   18 123/63    Weight from Last 3 Encounters:   18 76.5 kg (168 lb 9.6 oz)   18 76.5 kg (168 lb 9.6 oz)   18 74.4 kg (164 lb)              Today, you had the following     No orders found for display         Today's Medication Changes          These changes are accurate as of 17 11:59 PM.  If you have any questions, ask your nurse or doctor.               These medicines have changed or have updated prescriptions.        Dose/Directions    cephALEXin 500 MG " capsule   Commonly known as:  KEFLEX   This may have changed:  additional instructions   Used for:  Left foot infection, Cellulitis of left lower extremity        Dose:  500 mg   Take 1 capsule (500 mg) by mouth 4 times daily for 10 days   Quantity:  28 capsule   Refills:  0       metoprolol succinate 50 MG 24 hr tablet   Commonly known as:  TOPROL-XL   This may have changed:  additional instructions   Used for:  Cardiomyopathy (H), Coronary artery disease involving native coronary artery of native heart without angina pectoris        Dose:  50 mg   Take 1 tablet (50 mg) by mouth daily   Quantity:  90 tablet   Refills:  3                Primary Care Provider Office Phone # Fax #    Matt MELVIN Morrissey [phone]       [facility] [address] [place] [address]        Equal Access to Services     LAURENCE JACOBSON : Hadii aad ku hadasho Sogilbertali, waaxda luqadaha, qaybta kaalmada adeegyada, maurice gama . So [facility] [phone].    ATENCIÓN: Si habla español, tiene a garvey disposición servicios gratuitos de asistencia lingüística. LlThe [facility] [phone].    We comply with applicable federal civil rights laws and [place] laws. We do not discriminate on the basis of race, color, national origin, age, disability, sex, sexual orientation, or gender identity.            Thank you!     Thank you for choosing G[facility]IAT[facility]AL [facility]  for your care. Our goal is always to provide you with excellent care. Hearing back from our patients is one way we can continue to improve our services. Please take a few minutes to complete the written survey that you may receive in the mail after your visit with us. Thank you!             Your Updated Medication List - Protect others around you: Learn how to safely use, store and throw away your medicines at www.disposemymeds.org.          This list is accurate as of 6/14/17 11:59 PM.  Always use your most recent med list.                    Brand Name Dispense Instructions for use Diagnosis    AMITRIPTYLINE HCL PO      Take 25 mg by mouth nightly as needed for sleep        aspirin 81 MG EC tablet     30 tablet    Take 1 tablet (81 mg) by mouth daily    Transient cerebral ischemia, unspecified type       ATORVASTATIN CALCIUM PO      Take 80 mg by mouth daily        blood glucose monitoring test strip    no brand specified    300 each    Use to test blood sugar three times daily or as directed.    Hypoglycemia       cephALEXin 500 MG capsule    KEFLEX    28 capsule    Take 1 capsule (500 mg) by mouth 4 times daily for 10 days    Left foot infection, Cellulitis of left lower extremity       doxycycline 100 MG tablet    VIBRA-TABS     Take 100 mg by mouth 2 times daily Give 1 tablet by mouth two times a day for As indicated for 10 Days . Ends 6/19        DULoxetine 30 MG EC capsule    CYMBALTA    90 capsule    Take 1 capsule (30 mg) by mouth daily    Polyneuropathy associated with underlying disease (H)       glipiZIDE 10 MG tablet    GLUCOTROL    180 tablet    Take 1 tablet (10 mg) by mouth 2 times daily (before meals)    Type 2 diabetes mellitus with diabetic peripheral angiopathy without gangrene, without long-term current use of insulin (H)       ipratropium 0.03 % spray    ATROVENT     Spray 2 sprays into both nostrils every 12 hours        LISINOPRIL PO      Take 2.5 mg by mouth daily Hold if SBP is less than 110. Call NP if held 2x in a row or symptomatic        METAMUCIL PO      Take 1 packet by mouth daily as needed        METFORMIN HCL PO      Take 1,000 mg by mouth 2 times daily (with meals)        metoprolol succinate 50 MG 24 hr tablet    TOPROL-XL    90 tablet    Take 1 tablet (50 mg) by mouth daily    Cardiomyopathy (H), Coronary artery disease involving native coronary artery of native heart without angina pectoris       omeprazole 40 MG capsule    priLOSEC    90 capsule    Take 1 capsule (40 mg) by mouth daily Take 30-60 minutes  before a meal.    Other acute gastritis without hemorrhage       order for DME     1 Device    Equipment being ordered: True Matrix Blood Glucose meter.    Type 2 diabetes mellitus without complication (H)       PLAVIX PO      Take 75 mg by mouth daily    Cough       tamsulosin 0.4 MG capsule    FLOMAX    90 capsule    Take 1 capsule (0.4 mg) by mouth daily    Benign non-nodular prostatic hyperplasia with lower urinary tract symptoms

## 2017-06-15 ENCOUNTER — TELEPHONE (OUTPATIENT)
Dept: PODIATRY | Facility: CLINIC | Age: 82
End: 2017-06-15

## 2017-06-15 ENCOUNTER — NURSING HOME VISIT (OUTPATIENT)
Dept: GERIATRICS | Facility: CLINIC | Age: 82
End: 2017-06-15
Payer: MEDICARE

## 2017-06-15 VITALS
DIASTOLIC BLOOD PRESSURE: 68 MMHG | BODY MASS INDEX: 26.79 KG/M2 | OXYGEN SATURATION: 96 % | SYSTOLIC BLOOD PRESSURE: 124 MMHG | WEIGHT: 161 LBS | HEART RATE: 64 BPM | RESPIRATION RATE: 18 BRPM | TEMPERATURE: 97.6 F

## 2017-06-15 DIAGNOSIS — I73.9 PVD (PERIPHERAL VASCULAR DISEASE) (H): ICD-10-CM

## 2017-06-15 DIAGNOSIS — K21.9 GASTROESOPHAGEAL REFLUX DISEASE WITHOUT ESOPHAGITIS: ICD-10-CM

## 2017-06-15 DIAGNOSIS — E11.42 DM TYPE 2 WITH DIABETIC PERIPHERAL NEUROPATHY (H): ICD-10-CM

## 2017-06-15 DIAGNOSIS — I10 BENIGN ESSENTIAL HYPERTENSION: ICD-10-CM

## 2017-06-15 DIAGNOSIS — E08.621 DIABETIC ULCER OF LEFT FOOT ASSOCIATED WITH DIABETES MELLITUS DUE TO UNDERLYING CONDITION (H): ICD-10-CM

## 2017-06-15 DIAGNOSIS — L97.529 DIABETIC ULCER OF LEFT FOOT ASSOCIATED WITH DIABETES MELLITUS DUE TO UNDERLYING CONDITION (H): ICD-10-CM

## 2017-06-15 DIAGNOSIS — I42.9 CARDIOMYOPATHY (H): ICD-10-CM

## 2017-06-15 DIAGNOSIS — R53.81 PHYSICAL DECONDITIONING: Primary | ICD-10-CM

## 2017-06-15 DIAGNOSIS — I48.0 PAROXYSMAL ATRIAL FIBRILLATION (H): ICD-10-CM

## 2017-06-15 PROCEDURE — 99207 ZZC CDG-CORRECTLY CODED, REVIEWED AND AGREE: CPT | Performed by: INTERNAL MEDICINE

## 2017-06-15 PROCEDURE — 99306 1ST NF CARE HIGH MDM 50: CPT | Performed by: INTERNAL MEDICINE

## 2017-06-15 NOTE — PROGRESS NOTES
PRIMARY CARE PROVIDER AND CLINIC RESPONSIBLE:  Matt Morrissey, [facility] [address] / [facility] [address]        ADMISSION HISTORY AND PHYSICAL EXAMINATION     Chief Complaint   Patient presents with     Fatigue     Nausea     WOUND CARE         HISTORY OF PRESENT ILLNESS:  89 year old male, (1/31/1928), admitted to the [facility] TCU for continuation of medical care and rehab.    Dustin Pearce is a 89 year old male with history including DM2, Hypertension, CAD, CHF, atrial fibrillation, CVA, PAD, DLD, GERD and BPH, who was admitted at [facility] on 5/31 with worsening L toe/foot ulcer/wound. Imaging of left foot x-ray 5/31 showed no acute findings. Started on Zosyn 5/3 but MRI L foot 6/1 showed signs suspicious for osteomyelitis 5th MT head and 5th toe prox phalangeal base; 5th MT joint periarticular fluid signal intensity, possibly representing developing soft tissue abscess; dorsal and deeper soft tissue edema within the forefoot. Podiatry and Vascular teams were consulted. He was diagnosed left foot infected diabetic and ulcer/wound involving 5th toe with abscess and overlying cellulitis - s/p L partial 5th ray amputation 6/4/2017, s/p revision I&D with further resection necrotic tissue 6/6/2017. Zosyn changed to Ancef 6/7 then Keflex for 10 days as per ID recommendation. BC's 5/31 NGTD. Tissue cultures L foot 6/4 negative. Abcess cultures L foot 6/4 growing mod growth MSSA. L foot cultures 6/6 NGTD. In addition, KRYSTINA 6/1 suggested significant arterial insufficiency. Carotid US 6/2 showed 50-79& in R and occlusion of the L. LE angiogram 6/2 showed findings including significant stenosis found in left common iliac artery which was stented with a 10mm stent; L SFA stenosis noted and was angioplastied w 4 mm balloon. He treated ASA and Plavix. The wound was also related to peripheral arterial disease with nonhealing, infected wound -  L SFA angioplasty and s/p L MEG stent on 6/2 by  vascular team. He has right 5th PIP joint swelling, he was seen by orthopedic team and recommended colchicine and then Naproxen, improved. At TCU, he complained nausea and heartburn, nausea medications and Zantac was started, today, he feels better, no nausea or vomiting. He has pain at surgical site at left foot. He denies dyspnea, n&v,fever, CP, abdominal pain and diarrhea or constipation. Patient is seen and examined by me with Christina Walter NP. Please see Christina Walter's admit noted dated 6/12/17 for details of admission, past medical history, family history, allergies, medication list, social history and other details pertinent with this admission. Hospital admission and dc summary reviewed.     Past Medical History:   Diagnosis Date     Aortic root dilatation (H)      Benign essential hypertension 1/6/2017     Benign non-nodular prostatic hyperplasia with lower urinary tract symptoms 10/20/2016     CAD (coronary artery disease)      Cardiomyopathy (H)      Carotid artery stenosis 10/20/2016     Carotid occlusion, left      Coronary artery disease involving native coronary artery of native heart without angina pectoris 10/20/2016     Diabetes mellitus (H)      DJD (degenerative joint disease)      Gastro-oesophageal reflux disease      Gastroesophageal reflux disease without esophagitis 10/20/2016     Heart attack (H)      Hyperlipidemia      Hypertension      Mild cognitive impairment 10/20/2016     Nephrolithiasis     RECURRENT     Osteopenia      Paroxysmal atrial fibrillation (H)      PONV (postoperative nausea and vomiting)      Unspecified cerebral artery occlusion with cerebral infarction        Past Surgical History:   Procedure Laterality Date     AMPUTATE FOOT Left 6/4/2017    Procedure: AMPUTATE FOOT;  Sun Add#3: partial left foot amputation - Sagital Saw - Mini C-Arm;  Surgeon: Moe Kirk DPM;  Location: [facility] OR     CHOLECYSTECTOMY       ESOPHAGOSCOPY, GASTROSCOPY, DUODENOSCOPY (EGD),  COMBINED N/A 12/26/2016    Procedure: COMBINED ESOPHAGOSCOPY, GASTROSCOPY, DUODENOSCOPY (EGD);  Surgeon: Nasim Louis MD;  Location:  GI     GENITOURINARY SURGERY      KIDNEY STONE REMOVAL X 4     [facility] UGI ENDOSCOPY W EUS N/A 12/29/2016    Procedure: COMBINED ENDOSCOPIC ULTRASOUND, ESOPHAGOSCOPY, GASTROSCOPY, DUODENOSCOPY (EGD);  Surgeon: Nasim Lousi MD;  Location:  GI     HERNIA REPAIR       INCISION AND DRAINAGE FOOT, COMBINED Left 6/6/2017    Procedure: COMBINED INCISION AND DRAINAGE FOOT;  REVISIONAL LEFT FOOT INCISION AND DRAINAGE WITH POSSIBLE FLAP CLOSURE , ANTIBIOTIC SOLUTION ;  Surgeon: Nabil Ann DPM;  Location:  OR     LASER HOLMIUM LITHOTRIPSY URETER(S), INSERT STENT, COMBINED  3/2/2012    Procedure:COMBINED CYSTOSCOPY, URETEROSCOPY, LASER HOLMIUM LITHOTRIPSY URETER(S), INSERT STENT; CYSTOSCOPY, RIGHT RETROGRADES, RIGHT URETEROSCOPY, HOLMIUM LASER, RIGHT STENT PLACEMENT; Surgeon:ANJELICA LEÓN; Location: OR     ROTATOR CUFF REPAIR RT/LT         Current Outpatient Prescriptions   Medication Sig     doxycycline (VIBRA-TABS) 100 MG tablet Take 100 mg by mouth 2 times daily Give 1 tablet by mouth two times a day for As indicated for 10 Days .  Ends 6/19     HYDROcodone-acetaminophen (NORCO) 5-325 MG per tablet Take 1-2 tablets by mouth every 4 hours as needed for moderate to severe pain Reported on 3/7/2017     cephALEXin (KEFLEX) 500 MG capsule Take 1 capsule (500 mg) by mouth 4 times daily for 10 days (Patient taking differently: Take 500 mg by mouth 4 times daily Last dose on 6/19)     LISINOPRIL PO Take 2.5 mg by mouth daily Hold if SBP is less than 110. Call NP if held 2x in a row or symptomatic     AMITRIPTYLINE HCL PO Take 25 mg by mouth nightly as needed for sleep     ipratropium (ATROVENT) 0.03 % spray Spray 2 sprays into both nostrils every 12 hours     DULoxetine (CYMBALTA) 30 MG EC capsule Take 1 capsule (30 mg) by mouth daily     glipiZIDE (GLUCOTROL)  "10 MG tablet Take 1 tablet (10 mg) by mouth 2 times daily (before meals)     metoprolol (TOPROL-XL) 50 MG 24 hr tablet Take 1 tablet (50 mg) by mouth daily (Patient taking differently: Take 50 mg by mouth daily Hold if SBP is less 100 and HR is less than 55. Call NP if held 2x in a row or symptomatic)     omeprazole (PRILOSEC) 40 MG capsule Take 1 capsule (40 mg) by mouth daily Take 30-60 minutes before a meal.     tamsulosin (FLOMAX) 0.4 MG capsule Take 1 capsule (0.4 mg) by mouth daily     aspirin EC 81 MG EC tablet Take 1 tablet (81 mg) by mouth daily     METFORMIN HCL PO Take 1,000 mg by mouth 2 times daily (with meals)     ATORVASTATIN CALCIUM PO Take 80 mg by mouth daily     Psyllium (METAMUCIL PO) Take 1 packet by mouth daily      Clopidogrel Bisulfate, [id number], (PLAVIX PO) Take 75 mg by mouth daily      blood glucose monitoring (NO BRAND SPECIFIED) test strip Use to test blood sugar three times daily or as directed.     order for DME Equipment being ordered: True Matrix Blood Glucose meter.     No current facility-administered medications for this visit.        Allergies   Allergen Reactions     Oxycodone Other (See Comments)     \"TERRIBLE SWEATING\"     Sulfa Drugs      Tetracycline        Social History     Social History     Marital status:      Spouse name: N/A     Number of children: N/A     Years of education: N/A     Occupational History     Not on file.     Social History Main Topics     Smoking status: Former Smoker     Packs/day: 1.00     Years: 30.00     Types: Cigarettes     Quit date: 1/1/1999     Smokeless tobacco: Never Used     Alcohol use 4.2 oz/week     7 Standard drinks or equivalent per week      Comment: 1 drink per week     Drug use: No     Sexual activity: No     Other Topics Concern     Not on file     Social History Narrative          Information reviewed:  Medications, vital signs, orders, nursing notes, problem list, hospital information.     ROS: All 10 point review of " system completed, those pertinent positive, please see H&P, the remaining ROS is negative.    /68  Pulse 64  Temp 97.6  F (36.4  C)  Resp 18  Wt 161 lb (73 kg)  SpO2 96%  BMI 26.79 kg/m2    PHYSICAL EXAMINATION:   GENERAL:  No acute distress.  SKIN:  Dry and warm.  There is no rash at area of skin examined.  HEENT:  Head without trauma.  Pupils round, reactive. Exam of conjunctiva and lids are normal. Sclera without icterus. There is no oral thrush.  NECK:  Supple. No jugular venous distension.  CHEST: No reproducible chest tenderness.   LUNGS:  Normal respiratory effort. Lungs reveal decreased breath sounds at bases. No rales or wheezes.  HEART:  Regular rate and rhythm.  No murmur, gallops or rubs auscultated.  ABDOMEN:  Soft, bowel sounds positive.  There is no tenderness or guarding.   EXTREMITIES: No edema. Left foot surgical wound with dressing.  NEUROLOGIC:  Alert and oriented x3.  There is no focal deficit appreciated. Gait not tested.  PSYCH: Recent and remote memory is normal. Mood and affect are normal.    Lab/Diagnostic data:  Reviewed  CBC Lab Results (Last 5 results in 365 days)       WBC RBC Hgb Hct MCV MCH MCHC RDW Plt Neut # Lymph # Eos # Baso # Immat. Gran #   06/13/17 0000 -- -- 10.7 -- -- -- -- -- -- -- -- -- -- --   06/09/17 0535 7.9 3.53 10.8 32.8 93 30.6 32.9 13.9 214 -- -- -- -- --   06/08/17 0613 8.7 3.58 10.9 33.2 93 30.4 32.8 13.6 187 -- -- -- -- --   06/07/17 0640 7.0 3.44 10.3 32.0 93 29.9 32.2 13.6 178 -- -- -- -- --   06/01/17 0656 8.0 3.78 11.5 35.9 95 30.4 32.0 13.8 158 -- -- -- -- --   CMP Labs (Last 5 results in 365 days)       Na+ K+ Cl CO2 ANION GAP Glc BUN Creat GFR GFR-Black Calcium Mg ALT AST A1C TSH LDL HIV   06/13/17 0000 142 4.6 107 28 7 108 20 0.68 >60 >60 9.4 -- -- -- -- -- -- --   06/09/17 1209 -- -- -- -- -- 124 -- -- -- -- -- -- -- -- -- -- -- --   06/09/17 0809 -- -- -- -- -- 130 -- -- -- -- -- -- -- -- -- -- -- --   06/09/17 0535 -- -- -- -- -- 162 -- --  -- -- -- -- -- -- -- -- -- --   06/09/17 0142 -- -- -- -- -- 141 --                    ASSESSMENT / PLAN:     Physical deconditioning  Plan: PT/OT with increase independence, self-care, strength and endurance and other cares that help return to home/facility of origin, fall precautions. Care conference with patient and family for the progress of rehab and disposition issues will be discussed as planned. Rehab evaluation and other evaluations including CPT are at rehab logs, to be reviewed separately.  Fall risk assessment as well as cognitive evaluation so far performed:  SLUMS:  18/30  Bed mobility: I  Transfers: s  Ambulating distance: can't put weight through his knees.  Bars     Diabetic ulcer of left foot associated with diabetes mellitus due to underlying condition (H) S/p left partial 5th ray amputation 6/4/2017, s/p revision I&D with further resection necrotic tissue 6/6/2017  Plan: Continue Keflex and wound care. Follow up podiatrist and vascular clinic.    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  Lab Results   Component Value Date    A1C 8.3 04/21/2017    A1C 7.9 12/26/2016    A1C 8.5 10/20/2016    A1C 7.1 12/05/2014    A1C 7.3 08/14/2014     Benign essential hypertension, Cardiomyopathy (H) and Paroxysmal atrial fibrillation (H)  Plan: Continue ASA, Plavix,, metoprolol and lisinopril. He is not on anticoagulation for atrial fibrillation, will leave his PCP and cards.    PVD (peripheral vascular disease) (H) S/p  L SFA angioplasty and s/p L MEG stent on 6/2/17  Plan: Continue ASA, Plavix. Follow vascular team as scheduled.    Gastroesophageal reflux disease without esophagitis and nausea  Plan: Continue Zantac and nausea medications. Nausea may be related to narcotics.    Other problems with same care. Primary care doctor and other specialists to address those chronic problems  in next clinic appointment to be scheduled upon discharge from the TCU.      Total time spent with patient visit was 38 min including patient visit, review of past records, patients counseling and coordinating care.        Jose Guadalupe Rodriguez MD

## 2017-06-15 NOTE — MR AVS SNAPSHOT
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)        Benign essential hypertension        Cardiomyopathy (H)        Paroxysmal atrial fibrillation (H)        PVD (peripheral vascular disease) (H)        Gastroesophageal reflux disease without esophagitis           Follow-ups after your visit        Your next 10 appointments already scheduled     Jun 22, 2017 11:15 AM CDT   [facility] Visit with Nabil Ann DPM   [facility]    [address] [address]   [phone]            Jul 24, 2017  3:00 PM CDT   Office Visit with Matt Morrissey MD   [facility]    [address] [address]   [phone]           Bring a current list of meds and any records pertaining to this visit.  For Physicals, please bring immunization records and any forms needing to be filled out.  Please arrive 10 minutes early to complete paperwork.              Who to contact     If you have questions or need follow up information about today's clinic visit or your schedule please contact [facility] directly at [phone].  Normal or non-critical lab and imaging results will be communicated to you by MyChart, letter or phone within 4 business days after the clinic has received the results. If you do not hear from us within 7 days, please contact the clinic through MyChart or phone. If you have a critical or abnormal lab result, we will notify you by phone as soon as  "possible.  Submit refill requests through [employer] or call your pharmacy and they will forward the refill request to us. Please allow 3 business days for your refill to be completed.          Additional Information About Your Visit        [employer] Information     [employer] lets you send messages to your doctor, view your test results, renew your prescriptions, schedule appointments and more. To sign up, go to [email or web address] . Click on \"Log in\" on the left side of the screen, which will take you to the Welcome page. Then click on \"Sign up Now\" on the right side of the page.     You will be asked to enter the access code listed below, as well as some personal information. Please follow the directions to create your username and password.     Your access code is: [email or web address]  Expires: 2017 12:00 PM     Your access code will  in 90 days. If you need help or a new code, please call your [place] clinic or [phone].        Care EveryWhere ID     This is your Care EveryWhere ID. This could be used by other organizations to access your [place] medical records  [id number]        Your Vitals Were     Pulse Temperature Respirations Pulse Oximetry BMI (Body Mass Index)       64 97.6  F (36.4  C) 18 96% 26.79 kg/m2        Blood Pressure from Last 3 Encounters:   06/15/17 124/68   17 135/85   17 119/71    Weight from Last 3 Encounters:   06/15/17 161 lb (73 kg)   17 161 lb 9.6 oz (73.3 kg)   17 161 lb 9.6 oz (73.3 kg)              Today, you had the following     No orders found for display         Today's Medication Changes          These changes are accurate as of: 6/15/17 11:29 AM.  If you have any questions, ask your nurse or doctor.               These medicines have changed or have updated prescriptions.        Dose/Directions    cephALEXin 500 MG capsule   Commonly known as:  KEFLEX   This may have changed:  additional instructions   Used for:  Left foot infection, Cellulitis " of left lower extremity        Dose:  500 mg   Take 1 capsule (500 mg) by mouth 4 times daily for 10 days   Quantity:  28 capsule   Refills:  0       metoprolol 50 MG 24 hr tablet   Commonly known as:  TOPROL-XL   This may have changed:  additional instructions   Used for:  Cardiomyopathy (H), Coronary artery disease involving native coronary artery of native heart without angina pectoris        Dose:  50 mg   Take 1 tablet (50 mg) by mouth daily   Quantity:  90 tablet   Refills:  3                Primary Care Provider Office Phone # Fax #    Matt Morrissey [phone]       [facility] [address]  [facility] [address]        Thank you!     Thank you for choosing [facility]  for your care. Our goal is always to provide you with excellent care. Hearing back from our patients is one way we can continue to improve our services. Please take a few minutes to complete the written survey that you may receive in the mail after your visit with us. Thank you!             Your Updated Medication List - Protect others around you: Learn how to safely use, store and throw away your medicines at www.disposemymeds.org.          This list is accurate as of: 6/15/17 11:29 AM.  Always use your most recent med list.                   Brand Name Dispense Instructions for use    AMITRIPTYLINE HCL PO      Take 25 mg by mouth nightly as needed for sleep       aspirin 81 MG EC tablet     30 tablet    Take 1 tablet (81 mg) by mouth daily       ATORVASTATIN CALCIUM PO      Take 80 mg by mouth daily       blood glucose monitoring test strip    no brand specified    300 each    Use to test blood sugar three times daily or as directed.       cephALEXin 500 MG capsule    KEFLEX    28 capsule    Take 1 capsule (500 mg) by mouth 4 times daily for 10 days       doxycycline 100 MG tablet    VIBRA-TABS     Take 100 mg by mouth 2 times daily Give 1 tablet by mouth two times a day for As indicated for 10  Days . Ends 6/19       DULoxetine 30 MG EC capsule    CYMBALTA    90 capsule    Take 1 capsule (30 mg) by mouth daily       glipiZIDE 10 MG tablet    GLUCOTROL    180 tablet    Take 1 tablet (10 mg) by mouth 2 times daily (before meals)       HYDROcodone-acetaminophen 5-325 MG per tablet    NORCO     Take 1-2 tablets by mouth every 4 hours as needed for moderate to severe pain Reported on 3/7/2017       ipratropium 0.03 % spray    ATROVENT     Spray 2 sprays into both nostrils every 12 hours       LISINOPRIL PO      Take 2.5 mg by mouth daily Hold if SBP is less than 110. Call NP if held 2x in a row or symptomatic       METAMUCIL PO      Take 1 packet by mouth daily       METFORMIN HCL PO      Take 1,000 mg by mouth 2 times daily (with meals)       metoprolol 50 MG 24 hr tablet    TOPROL-XL    90 tablet    Take 1 tablet (50 mg) by mouth daily       omeprazole 40 MG capsule    priLOSEC    90 capsule    Take 1 capsule (40 mg) by mouth daily Take 30-60 minutes before a meal.       order for DME     1 Device    Equipment being ordered: [employer] Blood Glucose meter.       PLAVIX PO      Take 75 mg by mouth daily       tamsulosin 0.4 MG capsule    FLOMAX    90 capsule    Take 1 capsule (0.4 mg) by mouth daily

## 2017-06-15 NOTE — TELEPHONE ENCOUNTER
I called and LVM for Sena.  Ok to utilize [employer] from my standpoint.  Advised she call back if she needs a DME order for this.    ZACHARY LouisM   Podiatric Foot & Ankle Surgeon  [facility]

## 2017-06-15 NOTE — TELEPHONE ENCOUNTER
Sena from [facility] called to see if it is okay for patient to trial a knee roll-about scooter.   He is currently non-weight bearing following Revision irrigation and debridement with resection of necrotic tissue down to and including deep fascia on 6/6/2017.     Okay to [facility] for Sena at [facility] at [phone].   Please advise and I will return call.     Rachel Vu, ATC

## 2017-06-16 ENCOUNTER — NURSING HOME VISIT (OUTPATIENT)
Dept: GERIATRICS | Facility: CLINIC | Age: 82
End: 2017-06-16
Payer: MEDICARE

## 2017-06-16 VITALS
DIASTOLIC BLOOD PRESSURE: 56 MMHG | SYSTOLIC BLOOD PRESSURE: 79 MMHG | WEIGHT: 161 LBS | TEMPERATURE: 97.6 F | BODY MASS INDEX: 26.79 KG/M2 | RESPIRATION RATE: 16 BRPM | HEART RATE: 63 BPM | OXYGEN SATURATION: 97 %

## 2017-06-16 DIAGNOSIS — E11.42 DM TYPE 2 WITH DIABETIC PERIPHERAL NEUROPATHY (H): ICD-10-CM

## 2017-06-16 DIAGNOSIS — E08.621 DIABETIC ULCER OF LEFT FOOT ASSOCIATED WITH DIABETES MELLITUS DUE TO UNDERLYING CONDITION (H): Primary | ICD-10-CM

## 2017-06-16 DIAGNOSIS — I10 BENIGN ESSENTIAL HYPERTENSION: ICD-10-CM

## 2017-06-16 DIAGNOSIS — D62 ANEMIA DUE TO BLOOD LOSS, ACUTE: ICD-10-CM

## 2017-06-16 DIAGNOSIS — R19.7 DIARRHEA, UNSPECIFIED TYPE: ICD-10-CM

## 2017-06-16 DIAGNOSIS — L97.529 DIABETIC ULCER OF LEFT FOOT ASSOCIATED WITH DIABETES MELLITUS DUE TO UNDERLYING CONDITION (H): Primary | ICD-10-CM

## 2017-06-16 PROCEDURE — 99308 SBSQ NF CARE LOW MDM 20: CPT | Performed by: NURSE PRACTITIONER

## 2017-06-16 NOTE — PROGRESS NOTES
"[facility]    Chief Complaint   Patient presents with     RECHECK       HPI:    Dustin Pearce is a 89 year old  (1/31/1928), who is being seen today for an episodic care visit at [facility].  HPI information obtained from: facility chart records.  Today's concern is:     Diabetic ulcer of left foot associated with diabetes mellitus due to underlying condition (H)  Diarrhea, unspecified type: states has had loose BM, \"but I cant see it\". Feels a little queasy at times.  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 Appetite is fair.  No pain.      ALLERGIES: Oxycodone; Sulfa drugs; and Tetracycline  Past Medical, Surgical, Family and Social History reviewed and updated in [facility].    Current Outpatient Prescriptions   Medication Sig Dispense Refill     doxycycline (VIBRA-TABS) 100 MG tablet Take 100 mg by mouth 2 times daily Give 1 tablet by mouth two times a day for As indicated for 10 Days .  Ends 6/19       HYDROcodone-acetaminophen (NORCO) 5-325 MG per tablet Take 1-2 tablets by mouth every 4 hours as needed for moderate to severe pain Reported on 3/7/2017  0     cephALEXin (KEFLEX) 500 MG capsule Take 1 capsule (500 mg) by mouth 4 times daily for 10 days (Patient taking differently: Take 500 mg by mouth 4 times daily Last dose on 6/19) 28 capsule 0     LISINOPRIL PO Take 2.5 mg by mouth daily Hold if SBP is less than 110. Call NP if held 2x in a row or symptomatic       AMITRIPTYLINE HCL PO Take 25 mg by mouth nightly as needed for sleep       ipratropium (ATROVENT) 0.03 % spray Spray 2 sprays into both nostrils every 12 hours       DULoxetine (CYMBALTA) 30 MG EC capsule Take 1 capsule (30 mg) by mouth daily 90 capsule 3     glipiZIDE (GLUCOTROL) 10 MG tablet Take 1 tablet (10 mg) by " mouth 2 times daily (before meals) 180 tablet 3     metoprolol (TOPROL-XL) 50 MG 24 hr tablet Take 1 tablet (50 mg) by mouth daily (Patient taking differently: Take 50 mg by mouth daily Hold if SBP is less 100 and HR is less than 55. Call NP if held 2x in a row or symptomatic) 90 tablet 3     omeprazole (PRILOSEC) 40 MG capsule Take 1 capsule (40 mg) by mouth daily Take 30-60 minutes before a meal. 90 capsule 3     tamsulosin (FLOMAX) 0.4 MG capsule Take 1 capsule (0.4 mg) by mouth daily 90 capsule 3     aspirin EC 81 MG EC tablet Take 1 tablet (81 mg) by mouth daily 30 tablet 0     METFORMIN HCL PO Take 1,000 mg by mouth 2 times daily (with meals)       ATORVASTATIN CALCIUM PO Take 80 mg by mouth daily       Psyllium (METAMUCIL PO) Take 1 packet by mouth daily        Clopidogrel Bisulfate, 3410852159, (PLAVIX PO) Take 75 mg by mouth daily        blood glucose monitoring (NO BRAND SPECIFIED) test strip Use to test blood sugar three times daily or as directed. 300 each 3     order for DME Equipment being ordered: True Matrix Blood Glucose meter. 1 Device 0     Medications reviewed:  Medications reconciled to facility chart and changes were made to reflect current medications as identified as above med list. Below are the changes that were made:   Medications stopped since last EPIC medication reconciliation:   There are no discontinued medications.    Medications started since last [facility] medication reconciliation:  No orders of the defined types were placed in this encounter.        REVIEW OF SYSTEMS:  10 point ROS of systems including Constitutional, Eyes, Respiratory, Cardiovascular, Gastroenterology, Genitourinary, Integumentary, Muscularskeletal, Psychiatric were all negative except for pertinent positives noted in my HPI.    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 Left foot has drsg-see below.  ABDOMEN:  normal bowel sounds, soft, nontender.  M/S:   FERNANDEZ equally, left foot has drsg up past ankle. toes are warm, pink and has feeling but decreased.  SKIN:  Inspection of skin and subcutaneous tissue baseline, left foot drsg is dry and intact  NEURO:   Cranial nerves 2-12 are normal tested and grossly at patient's baseline  PSYCH:  oriented X 3, ?memory impaired , affect and mood normal    BP: 06/09-06/19: /56-76 mmHg  BGL: 06/09-06/19:  mg/dL    Recent Labs:    CBC RESULTS:   Recent Labs   Lab Test 06/13/17 06/09/17   0535  06/08/17 0613   WBC   --   7.9  8.7   RBC   --   3.53*  3.58*   HGB  10.7*  10.8*  10.9*   HCT   --   32.8*  33.2*   MCV   --   93  93   MCH   --   30.6  30.4   MCHC   --   32.9  32.8   RDW   --   13.9  13.6   PLT   --   214  187       Last Basic Metabolic Panel:  Recent Labs   Lab Test 06/13/17 06/09/17   0535  06/08/17 0613   NA  142   --   138   POTASSIUM  4.6   --   3.8   CHLORIDE  107   --   104   ALLISON  9.4   --   8.8   CO2  28   --   28   BUN  20   --   12   CR  0.68*   --   0.62*   GLC  108  162*  141*     ASSESSMENT / PLAN:    (E08.621,  L97.529) Diabetic ulcer of left foot associated with diabetes mellitus due to underlying condition (H)  (primary encounter diagnosis)   (E11.42) 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 No changes to POC today. Monitor.    (R19.7) Diarrhea, unspecified type  Comment: likely due to ABX.   Plan to have staff to collect in top hat and if it is loose: send for C.Diff. Abx end early  next week      (D62) Anemia due to blood loss, acute  Comment: Hgb 10.7, check prn     (I10) Benign essential hypertension  Comment: BMP WNL, check prn       Total time with patient visit: 25 minutes including discussions about the POC and care coordination with the patient and staff. Greater than 50% of total time spent with counseling and coordinating care.    Electronically signed by  ASPEN Montes CNP

## 2017-06-19 ENCOUNTER — CARE COORDINATION (OUTPATIENT)
Dept: CARE COORDINATION | Facility: CLINIC | Age: 82
End: 2017-06-19

## 2017-06-19 DIAGNOSIS — I73.9 PAD (PERIPHERAL ARTERY DISEASE) (H): Primary | ICD-10-CM

## 2017-06-19 NOTE — PROGRESS NOTES
Care Coordination Transition Communication         Clinical Data: Patient was hospitalized at [facility] 05/31/17 to 06/09/17  with diagnosis of infected non healing left foot ulcer.    [facility]    Contact name and phone number/fax:  [facility]   #  [phone]     Plan: RN/SW Care Coordinator will await notification from facility staff informing RN/SW Care Coordinator of patient's discharge plans/needs. RN/SW Care Coordinator will review chart and outreach to facility staff every 4 weeks and as needed.     FERNANDO ChavezN, RN, PHN  Clinic Care Coordinator  [facility]  [phone]

## 2017-06-21 ENCOUNTER — NURSING HOME VISIT (OUTPATIENT)
Dept: GERIATRICS | Facility: CLINIC | Age: 82
End: 2017-06-21
Payer: MEDICARE

## 2017-06-21 VITALS
WEIGHT: 158.6 LBS | SYSTOLIC BLOOD PRESSURE: 112 MMHG | BODY MASS INDEX: 26.39 KG/M2 | TEMPERATURE: 97.9 F | OXYGEN SATURATION: 93 % | HEART RATE: 64 BPM | DIASTOLIC BLOOD PRESSURE: 57 MMHG | RESPIRATION RATE: 18 BRPM

## 2017-06-21 DIAGNOSIS — R11.0 NAUSEA: ICD-10-CM

## 2017-06-21 DIAGNOSIS — R53.81 PHYSICAL DECONDITIONING: ICD-10-CM

## 2017-06-21 DIAGNOSIS — I73.9 PAD (PERIPHERAL ARTERY DISEASE) (H): Primary | ICD-10-CM

## 2017-06-21 DIAGNOSIS — I10 BENIGN ESSENTIAL HYPERTENSION: ICD-10-CM

## 2017-06-21 DIAGNOSIS — R19.7 DIARRHEA, UNSPECIFIED TYPE: ICD-10-CM

## 2017-06-21 DIAGNOSIS — L97.529 DIABETIC ULCER OF LEFT FOOT ASSOCIATED WITH DIABETES MELLITUS DUE TO UNDERLYING CONDITION (H): Primary | ICD-10-CM

## 2017-06-21 DIAGNOSIS — E08.621 DIABETIC ULCER OF LEFT FOOT ASSOCIATED WITH DIABETES MELLITUS DUE TO UNDERLYING CONDITION (H): Primary | ICD-10-CM

## 2017-06-21 DIAGNOSIS — E11.42 DM TYPE 2 WITH DIABETIC PERIPHERAL NEUROPATHY (H): ICD-10-CM

## 2017-06-21 PROCEDURE — 99309 SBSQ NF CARE MODERATE MDM 30: CPT | Performed by: NURSE PRACTITIONER

## 2017-06-21 PROCEDURE — 99207 ZZC CDG-CORRECTLY CODED, REVIEWED AND AGREE: CPT | Performed by: NURSE PRACTITIONER

## 2017-06-21 RX ORDER — DIPHENHYDRAMINE HCL 25 MG
25 CAPSULE ORAL EVERY 8 HOURS PRN
COMMUNITY
Start: 2017-06-21 | End: 2017-06-27

## 2017-06-21 NOTE — PROGRESS NOTES
[facility]    Chief Complaint   Patient presents with     RECHECK       HPI:    Dustin Pearce is a 89 year old  (1/31/1928), who is being seen today for an episodic care visit at [facility].  HPI information obtained from: facility chart records.  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 Yesterday wanted  Zofran(had declined in earlier offerings)       No CP, SOB, Cough, dizziness, fevers, chills, HA, N/V, dysuria. BM--see above. Appetite is fair.  No pain. Left foot itching off and one.      ALLERGIES: Oxycodone; Sulfa drugs; and Tetracycline  Past Medical, Surgical, Family and Social History reviewed and updated in [facility].    Current Outpatient Prescriptions   Medication Sig Dispense Refill     Ondansetron HCl (ZOFRAN PO) Take 4 mg by mouth every 6 hours as needed for nausea or vomiting Don't give zofran pre meanls, only if actually nauseated.       diphenhydrAMINE (BENADRYL) 25 MG capsule Take 25 mg by mouth every 8 hours as needed for itching or allergies itching       HYDROcodone-acetaminophen (NORCO) 5-325 MG per tablet Take 1-2 tablets by mouth every 4 hours as needed for moderate to severe pain Reported on 3/7/2017  0     LISINOPRIL PO Take 2.5 mg by mouth daily Hold if SBP is less than 110. Call NP if held 2x in a row or symptomatic       AMITRIPTYLINE HCL PO Take 25 mg by mouth nightly as needed for sleep       ipratropium (ATROVENT) 0.03 % spray Spray 2 sprays into both nostrils every 12 hours       DULoxetine (CYMBALTA) 30 MG EC capsule Take 1 capsule (30  mg) by mouth daily 90 capsule 3     glipiZIDE (GLUCOTROL) 10 MG tablet Take 1 tablet (10 mg) by mouth 2 times daily (before meals) 180 tablet 3     metoprolol (TOPROL-XL) 50 MG 24 hr tablet Take 1 tablet (50 mg) by mouth daily (Patient taking differently: Take 50 mg by mouth daily Hold if SBP is less 100 and HR is less than 55. Call NP if held 2x in a row or symptomatic) 90 tablet 3     omeprazole (PRILOSEC) 40 MG capsule Take 1 capsule (40 mg) by mouth daily Take 30-60 minutes before a meal. 90 capsule 3     tamsulosin (FLOMAX) 0.4 MG capsule Take 1 capsule (0.4 mg) by mouth daily 90 capsule 3     aspirin EC 81 MG EC tablet Take 1 tablet (81 mg) by mouth daily 30 tablet 0     METFORMIN HCL PO Take 1,000 mg by mouth 2 times daily (with meals)       ATORVASTATIN CALCIUM PO Take 80 mg by mouth daily       blood glucose monitoring (NO BRAND SPECIFIED) test strip Use to test blood sugar three times daily or as directed. 300 each 3     order for DME Equipment being ordered: True Matrix Blood Glucose meter. 1 Device 0     Psyllium (METAMUCIL PO) Take 1 packet by mouth daily        Clopidogrel Bisulfate, 8897362208, (PLAVIX PO) Take 75 mg by mouth daily        Medications reviewed:  Medications reconciled to facility chart and changes were made to reflect current medications as identified as above med list. Below are the changes that were made:   Medications stopped since last EPIC medication reconciliation:   There are no discontinued medications.    Medications started since last [facility] medication reconciliation:  No orders of the defined types were placed in this encounter.        REVIEW OF SYSTEMS:  10 point ROS of systems including Constitutional, Eyes, Respiratory, Cardiovascular, Gastroenterology, Genitourinary, Integumentary, Muscularskeletal, Psychiatric were all negative except for pertinent positives noted in my HPI.    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 Left foot has drsg-see below.  ABDOMEN:  normal bowel sounds, soft, nontender.  M/S:   FERNANDEZ equally, left foot has drsg up past ankle. Toes are warm, pink and +CSM  SKIN:  Inspection of skin and subcutaneous tissue baseline, left foot drsg is dry and intact  NEURO:   Cranial nerves 2-12 are normal tested and grossly at patient's baseline  PSYCH:  oriented X 3, ?memory impaired , affect and mood normal    BP: 06/14-06/21: /56-75 mmHg  BGL: 06/14-06/21:  mg/dL    Recent Labs:    CBC RESULTS:   Recent Labs   Lab Test 06/13/17 06/09/17   0535  06/08/17   0613   WBC   --   7.9  8.7   RBC   --   3.53*  3.58*   HGB  10.7*  10.8*  10.9*   HCT   --   32.8*  33.2*   MCV   --   93  93   MCH   --   30.6  30.4   MCHC   --   32.9  32.8   RDW   --   13.9  13.6   PLT   --   214  187       Last Basic Metabolic Panel:  Recent Labs   Lab Test 06/13/17 06/09/17   0535  06/08/17   0613   NA  142   --   138   POTASSIUM  4.6   --   3.8   CHLORIDE  107   --   104   ALLISON  9.4   --   8.8   CO2  28   --   28   BUN  20   --   12   CR  0.68*   --   0.62*   GLC  108  162*  141*         ASSESSMENT / PLAN:    (E08.621,  L97.529) Diabetic ulcer of left foot associated with diabetes mellitus due to underlying condition (H)  (primary encounter diagnosis)   (E11.42) 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  He goes to Dr Ann in am for post op check, Monitor.    (R19.7) Diarrhea,  unspecified type  Comment: likely due to ABX and these ended on 6/19, improving but still a bit like a cow pie. Prn Zofran            (I10) Benign essential hypertension  Comment: reasonably controlled though a couple low ones, will keep POC as is as has hold parameters for lisinopril and metoprolol    (R11.0) Nausea  Comment: prn Zofran if needed.    (R53.81) Physical deconditioning  Comment: is progressing is non wt bearing left foot. No LCD and cont with PT and OT.       Electronically signed by  ASPEN Montes CNP

## 2017-06-22 ENCOUNTER — OFFICE VISIT (OUTPATIENT)
Dept: PODIATRY | Facility: CLINIC | Age: 82
End: 2017-06-22
Payer: MEDICARE

## 2017-06-22 VITALS — BODY MASS INDEX: 22.99 KG/M2 | WEIGHT: 138 LBS | HEIGHT: 65 IN

## 2017-06-22 DIAGNOSIS — Z98.890 S/P FOOT SURGERY, LEFT: Primary | ICD-10-CM

## 2017-06-22 PROCEDURE — 99024 POSTOP FOLLOW-UP VISIT: CPT | Performed by: PODIATRIST

## 2017-06-22 NOTE — PROGRESS NOTES
"Foot & Ankle Surgery  June 22, 2017    S:  Patient in today approx 2 1/2 weeks sp partial 5th ray resection by Dr Kirk with revision and closure by myself 2 days later.  Pain levels minimal.  GI upset/vomiting, thinks it may have been related to keflex he was on x 10 days.  He's done with it now    Ht 5' 5\" (1.651 m)  Wt 138 lb (62.6 kg)  BMI 22.96 kg/m2      ROS - positive for CC.  Patient denies current nausea, vomiting, chills, fevers, belly pain, calf pain, chest pain or SOB.  Complete remainder of ROS is otherwise neg.    PE - sutures intact, skin margins coapted.  Stable eschar along incision.  Removed 2 distal sutures, but incision not healed sufficiently and remaining sutures left intact. Minimal edema, no erythema, no necrosis.  Skin shows no trophic, color or temperature changes otherwise.  No calf redness, swelling or pain noted otherwise.    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 No new  Abnormality.    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 and with soft tissue   adjacent to bone showing acute inflammation     Cultures -   Specimen Description 06/04/2017 10:27 AM 75   Left Foot Abscess   Special Requests 06/04/2017 10:27 AM 75   Specimen collected in eSwab transport (white cap)   Culture Micro (Abnormal) 06/04/2017 10:27 AM 75   Moderate growth Staphylococcus aureus         A/P - 89 year old yo patient approx 2 1/2 weeks sp above procedure  -sutures left intact, foot redressed  -continue QOD dressing changes at care facility, dry gauze  -NWB L foot  -minimize dangling of limb    Follow up  - 1 week or sooner with acute issues for possible suture removal    Body mass index is 22.96 kg/(m^2).  Weight management plan Patient was referred to their PCP to discuss a diet and exercise " plan.      Nabil Ann, ZACHARYM   Podiatric Foot & Ankle Surgeon  [facility]  [phone]

## 2017-06-22 NOTE — MR AVS SNAPSHOT
After Visit Summary   6/22/2017    Dustin Pearce    MRN: [id number]           Patient Information     Date Of Birth          1/31/1928        Visit Information        Provider Department      6/22/2017 11:15 AM Nabil Ann DPM [facility]         Follow-ups after your visit        Your next 10 appointments already scheduled     Jun 29, 2017  8:30 AM CDT   [facility] Visit with Nabil Ann DPM   [facility]    [address] [id number] [phone]            Jul 24, 2017  3:00 PM CDT   Office Visit with Matt Morrissey MD   [facility]    [address] [address] [phone]           Bring a current list of meds and any records pertaining to this visit.  For Physicals, please bring immunization records and any forms needing to be filled out.  Please arrive 10 minutes early to complete paperwork.              Who to contact     If you have questions or need follow up information about today's clinic visit or your schedule please contact [facility] directly at [phone].  Normal or non-critical lab and imaging results will be communicated to you by MyChart, letter or phone within 4 business days after the clinic has received the results. If you do not hear from us within 7 days, please contact the clinic through [employer] or phone. If you have a critical or abnormal lab result, we will notify you by phone as soon as possible.  Submit refill requests through [employer] or call your pharmacy and they will forward the refill request to us. Please allow 3 business days for your refill to be completed.          Additional Information About Your Visit        [employer] Information     [employer] lets you send messages to your doctor, view your test results, renew your prescriptions, schedule appointments and more. To sign up, go to [email or web address] . Click on  "\"Log in\" on the left side of the screen, which will take you to the Welcome page. Then click on \"Sign up Now\" on the right side of the page.     You will be asked to enter the access code listed below, as well as some personal information. Please follow the directions to create your username and password.     Your access code is: [email or web address]  Expires: 2017 12:00 PM     Your access code will  in 90 days. If you need help or a new code, please call your [facility] clinic or [phone].        Care EveryWhere ID     This is your Care EveryWhere ID. This could be used by other organizations to access your [facility] medical records  [id number]        Your Vitals Were     Height BMI (Body Mass Index)                5' 5\" (1.651 m) 22.96 kg/m2           Blood Pressure from Last 3 Encounters:   17 112/57   17 (!) 79/56   06/15/17 124/68    Weight from Last 3 Encounters:   17 138 lb (62.6 kg)   17 158 lb 9.6 oz (71.9 kg)   17 161 lb (73 kg)              Today, you had the following     No orders found for display         Today's Medication Changes          These changes are accurate as of: 17  4:25 PM.  If you have any questions, ask your nurse or doctor.               These medicines have changed or have updated prescriptions.        Dose/Directions    metoprolol 50 MG 24 hr tablet   Commonly known as:  TOPROL-XL   This may have changed:  additional instructions   Used for:  Cardiomyopathy (H), Coronary artery disease involving native coronary artery of native heart without angina pectoris        Dose:  50 mg   Take 1 tablet (50 mg) by mouth daily   Quantity:  90 tablet   Refills:  3                Primary Care Provider Office Phone # Fax #    Matt Morrissey [phone]       [facility] [address] [address]        Equal Access to Services     LAURENCE JACOBSON AH: Shay Brooks, waaxda zahra, qaybta maurice arias " marcia canashelena flor'aan ah. So [facility] [phone].    ATENCIÓN: Si cynla aimee, tiene a garvey disposición servicios gratuitos de asistencia lingüística. Jose Luis al [phone].    We comply with applicable federal civil rights laws and [place] laws. We do not discriminate on the basis of race, color, national origin, age, disability sex, sexual orientation or gender identity.            Thank you!     Thank you for choosing [facility]  for your care. Our goal is always to provide you with excellent care. Hearing back from our patients is one way we can continue to improve our services. Please take a few minutes to complete the written survey that you may receive in the mail after your visit with us. Thank you!             Your Updated Medication List - Protect others around you: Learn how to safely use, store and throw away your medicines at www.disposemymeds.org.          This list is accurate as of: 6/22/17  4:25 PM.  Always use your most recent med list.                   Brand Name Dispense Instructions for use Diagnosis    AMITRIPTYLINE HCL PO      Take 25 mg by mouth nightly as needed for sleep        aspirin 81 MG EC tablet     30 tablet    Take 1 tablet (81 mg) by mouth daily    Transient cerebral ischemia, unspecified type       ATORVASTATIN CALCIUM PO      Take 80 mg by mouth daily        BENADRYL 25 MG capsule   Generic drug:  diphenhydrAMINE      Take 25 mg by mouth every 8 hours as needed for itching or allergies itching        blood glucose monitoring test strip    no brand specified    300 each    Use to test blood sugar three times daily or as directed.    Hypoglycemia       DULoxetine 30 MG EC capsule    CYMBALTA    90 capsule    Take 1 capsule (30 mg) by mouth daily    Polyneuropathy associated with underlying disease (H)       glipiZIDE 10 MG tablet    GLUCOTROL    180 tablet    Take 1 tablet (10 mg) by mouth 2 times daily (before meals)    Type 2 diabetes mellitus with  diabetic peripheral angiopathy without gangrene, without long-term current use of insulin (H)       HYDROcodone-acetaminophen 5-325 MG per tablet    NORCO     Take 1-2 tablets by mouth every 4 hours as needed for moderate to severe pain Reported on 3/7/2017    Left foot infection       ipratropium 0.03 % spray    ATROVENT     Spray 2 sprays into both nostrils every 12 hours        LISINOPRIL PO      Take 2.5 mg by mouth daily Hold if SBP is less than 110. Call NP if held 2x in a row or symptomatic        METAMUCIL PO      Take 1 packet by mouth daily        METFORMIN HCL PO      Take 1,000 mg by mouth 2 times daily (with meals)        metoprolol 50 MG 24 hr tablet    TOPROL-XL    90 tablet    Take 1 tablet (50 mg) by mouth daily    Cardiomyopathy (H), Coronary artery disease involving native coronary artery of native heart without angina pectoris       omeprazole 40 MG capsule    priLOSEC    90 capsule    Take 1 capsule (40 mg) by mouth daily Take 30-60 minutes before a meal.    Other acute gastritis without hemorrhage       order for DME     1 Device    Equipment being ordered: True Matrix Blood Glucose meter.    Type 2 diabetes mellitus without complication (H)       PLAVIX PO      Take 75 mg by mouth daily    Cough       tamsulosin 0.4 MG capsule    FLOMAX    90 capsule    Take 1 capsule (0.4 mg) by mouth daily    Benign non-nodular prostatic hyperplasia with lower urinary tract symptoms       ZOFRAN PO      Take 4 mg by mouth every 6 hours as needed for nausea or vomiting Don't give zofran pre meanls, only if actually nauseated.

## 2017-06-23 ENCOUNTER — NURSING HOME VISIT (OUTPATIENT)
Dept: GERIATRICS | Facility: CLINIC | Age: 82
End: 2017-06-23
Payer: MEDICARE

## 2017-06-23 ENCOUNTER — NURSING HOME VISIT (OUTPATIENT)
Dept: GERIATRICS | Facility: CLINIC | Age: 82
End: 2017-06-23

## 2017-06-23 VITALS
HEART RATE: 65 BPM | OXYGEN SATURATION: 97 % | RESPIRATION RATE: 16 BRPM | SYSTOLIC BLOOD PRESSURE: 124 MMHG | DIASTOLIC BLOOD PRESSURE: 61 MMHG | TEMPERATURE: 97.6 F

## 2017-06-23 DIAGNOSIS — R19.7 DIARRHEA, UNSPECIFIED TYPE: ICD-10-CM

## 2017-06-23 DIAGNOSIS — L97.529 DIABETIC ULCER OF LEFT FOOT ASSOCIATED WITH DIABETES MELLITUS DUE TO UNDERLYING CONDITION (H): Primary | ICD-10-CM

## 2017-06-23 DIAGNOSIS — R11.0 NAUSEA: ICD-10-CM

## 2017-06-23 DIAGNOSIS — Z53.9 ERRONEOUS ENCOUNTER--DISREGARD: Primary | ICD-10-CM

## 2017-06-23 DIAGNOSIS — E11.42 DM TYPE 2 WITH DIABETIC PERIPHERAL NEUROPATHY (H): ICD-10-CM

## 2017-06-23 DIAGNOSIS — R53.81 PHYSICAL DECONDITIONING: ICD-10-CM

## 2017-06-23 DIAGNOSIS — E08.621 DIABETIC ULCER OF LEFT FOOT ASSOCIATED WITH DIABETES MELLITUS DUE TO UNDERLYING CONDITION (H): Primary | ICD-10-CM

## 2017-06-23 DIAGNOSIS — I10 BENIGN ESSENTIAL HYPERTENSION: ICD-10-CM

## 2017-06-23 PROBLEM — L97.509 DIABETIC FOOT ULCER (H): Status: RESOLVED | Noted: 2017-05-31 | Resolved: 2017-06-23

## 2017-06-23 PROBLEM — E11.621 DIABETIC FOOT ULCER (H): Status: RESOLVED | Noted: 2017-05-31 | Resolved: 2017-06-23

## 2017-06-23 PROCEDURE — 99309 SBSQ NF CARE MODERATE MDM 30: CPT | Performed by: NURSE PRACTITIONER

## 2017-06-23 NOTE — PROGRESS NOTES
[facility]    No chief complaint on file.      HPI:    Dustin Pearce is a 89 year old  (1/31/1928), who is being seen today for an episodic care visit at [facility].  HPI information obtained from: facility chart records.  Today's concern is: saw Dr Agustin yesterday--foot doing well but not healed enough to remove stitches yet.         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 BM-WNL.  Appetite is fair.  No pain.        ALLERGIES: Oxycodone; Sulfa drugs; and Tetracycline  Past Medical, Surgical, Family and Social History reviewed and updated in [facility].    Current Outpatient Prescriptions   Medication Sig Dispense Refill     Ondansetron HCl (ZOFRAN PO) Take 4 mg by mouth every 6 hours as needed for nausea or vomiting Don't give zofran pre meanls, only if actually nauseated.       diphenhydrAMINE (BENADRYL) 25 MG capsule Take 25 mg by mouth every 8 hours as needed for itching or allergies itching       HYDROcodone-acetaminophen (NORCO) 5-325 MG per tablet Take 1-2 tablets by mouth every 4 hours as needed for moderate to severe pain Reported on 3/7/2017  0     LISINOPRIL PO Take 2.5 mg by mouth daily Hold if SBP is less than 110. Call NP if held 2x in a row or symptomatic       AMITRIPTYLINE HCL PO Take 25 mg by mouth nightly as needed for sleep       ipratropium (ATROVENT) 0.03 % spray Spray 2 sprays into both nostrils every 12 hours       DULoxetine (CYMBALTA) 30 MG EC capsule Take 1 capsule (30 mg) by mouth daily 90 capsule 3     glipiZIDE (GLUCOTROL) 10 MG tablet Take 1 tablet (10 mg) by mouth 2  times daily (before meals) 180 tablet 3     metoprolol (TOPROL-XL) 50 MG 24 hr tablet Take 1 tablet (50 mg) by mouth daily (Patient taking differently: Take 50 mg by mouth daily Hold if SBP is less 100 and HR is less than 55. Call NP if held 2x in a row or symptomatic) 90 tablet 3     omeprazole (PRILOSEC) 40 MG capsule Take 1 capsule (40 mg) by mouth daily Take 30-60 minutes before a meal. 90 capsule 3     tamsulosin (FLOMAX) 0.4 MG capsule Take 1 capsule (0.4 mg) by mouth daily 90 capsule 3     aspirin EC 81 MG EC tablet Take 1 tablet (81 mg) by mouth daily 30 tablet 0     METFORMIN HCL PO Take 1,000 mg by mouth 2 times daily (with meals)       ATORVASTATIN CALCIUM PO Take 80 mg by mouth daily       blood glucose monitoring (NO BRAND SPECIFIED) test strip Use to test blood sugar three times daily or as directed. 300 each 3     order for DME Equipment being ordered: True Matrix Blood Glucose meter. 1 Device 0     Psyllium (METAMUCIL PO) Take 1 packet by mouth daily        Clopidogrel Bisulfate, [id number], (PLAVIX PO) Take 75 mg by mouth daily        Medications reviewed:  Medications reconciled to facility chart and changes were made to reflect current medications as identified as above med list. Below are the changes that were made:   Medications stopped since last EPIC medication reconciliation:   There are no discontinued medications.    Medications started since last [facility] medication reconciliation:  No orders of the defined types were placed in this encounter.        REVIEW OF SYSTEMS:  10 point ROS of systems including Constitutional, Eyes, Respiratory, Cardiovascular, Gastroenterology, Genitourinary, Integumentary, Muscularskeletal, Psychiatric were all negative except for pertinent positives noted in my HPI.    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 Left foot has drsg-see below.  ABDOMEN:  normal bowel sounds, soft, nontender.  M/S:   FERNANDEZ equally, left foot has drsg up past ankle. Toes are warm, pink and +CSM  SKIN:  Inspection of skin and subcutaneous tissue baseline, left foot drsg is dry and intact  NEURO:   Cranial nerves 2-12 are normal tested and grossly at patient's baseline  PSYCH:  oriented X 3, ?memory impaired , affect and mood normal    BP: 06/14-06/21: /56-75 mmHg  BGL: 06/14-06/21:  mg/dL    Recent Labs:    CBC RESULTS:   Recent Labs   Lab Test 06/13/17 06/09/17   0535  06/08/17   0613   WBC   --   7.9  8.7   RBC   --   3.53*  3.58*   HGB  10.7*  10.8*  10.9*   HCT   --   32.8*  33.2*   MCV   --   93  93   MCH   --   30.6  30.4   MCHC   --   32.9  32.8   RDW   --   13.9  13.6   PLT   --   214  187       Last Basic Metabolic Panel:  Recent Labs   Lab Test 06/13/17 06/09/17   0535  06/08/17 0613   NA  142   --   138   POTASSIUM  4.6   --   3.8   CHLORIDE  107   --   104   ALLISON  9.4   --   8.8   CO2  28   --   28   BUN  20   --   12   CR  0.68*   --   0.62*   GLC  108  162*  141*         ASSESSMENT / PLAN:    (E08.621,  L97.529) Diabetic ulcer of left foot associated with diabetes mellitus due to underlying condition (H)  (primary encounter diagnosis)   (E11.42) 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  Will see next week for another F/U.  Sugars good so plan no changes to DM meds.  Monitor. No wt bearing    (R19.7) Diarrhea, unspecified type  Comment: resolving nicely, likely due to ABX and these ended on 6/19,          (I10)  Benign essential hypertension  Comment: reasonably controlled , will cont POC as is as has hold parameters for lisinopril and metoprolol    (R11.0) Nausea  Comment: appears gone, will keep prn Zofran thru wkd    (R53.81) Physical deconditioning  Comment: is progressing is non wt bearing left foot. No LCD and cont with PT and OT.       Electronically signed by  ASPEN Montes CNP

## 2017-06-27 ENCOUNTER — NURSING HOME VISIT (OUTPATIENT)
Dept: GERIATRICS | Facility: CLINIC | Age: 82
End: 2017-06-27
Payer: MEDICARE

## 2017-06-27 VITALS
HEART RATE: 84 BPM | RESPIRATION RATE: 14 BRPM | BODY MASS INDEX: 26.39 KG/M2 | SYSTOLIC BLOOD PRESSURE: 124 MMHG | WEIGHT: 158.6 LBS | TEMPERATURE: 97 F | DIASTOLIC BLOOD PRESSURE: 78 MMHG | OXYGEN SATURATION: 94 %

## 2017-06-27 DIAGNOSIS — E11.42 DM TYPE 2 WITH DIABETIC PERIPHERAL NEUROPATHY (H): ICD-10-CM

## 2017-06-27 DIAGNOSIS — R11.0 NAUSEA: ICD-10-CM

## 2017-06-27 DIAGNOSIS — R19.7 DIARRHEA, UNSPECIFIED TYPE: ICD-10-CM

## 2017-06-27 DIAGNOSIS — R53.81 PHYSICAL DECONDITIONING: ICD-10-CM

## 2017-06-27 DIAGNOSIS — E08.621 DIABETIC ULCER OF LEFT FOOT ASSOCIATED WITH DIABETES MELLITUS DUE TO UNDERLYING CONDITION, UNSPECIFIED PART OF FOOT, UNSPECIFIED ULCER STAGE (H): Primary | ICD-10-CM

## 2017-06-27 DIAGNOSIS — I10 BENIGN ESSENTIAL HYPERTENSION: ICD-10-CM

## 2017-06-27 DIAGNOSIS — L97.529 DIABETIC ULCER OF LEFT FOOT ASSOCIATED WITH DIABETES MELLITUS DUE TO UNDERLYING CONDITION, UNSPECIFIED PART OF FOOT, UNSPECIFIED ULCER STAGE (H): Primary | ICD-10-CM

## 2017-06-27 PROCEDURE — 99207 ZZC CDG-CORRECTLY CODED, REVIEWED AND AGREE: CPT | Performed by: NURSE PRACTITIONER

## 2017-06-27 PROCEDURE — 99309 SBSQ NF CARE MODERATE MDM 30: CPT | Performed by: NURSE PRACTITIONER

## 2017-06-27 RX ORDER — HYDROCODONE BITARTRATE AND ACETAMINOPHEN 5; 325 MG/1; MG/1
1 TABLET ORAL EVERY 4 HOURS PRN
COMMUNITY
Start: 2017-06-27 | End: 2017-12-01

## 2017-06-27 RX ORDER — LOPERAMIDE HCL 2 MG
2 CAPSULE ORAL 2 TIMES DAILY PRN
COMMUNITY
Start: 2017-06-27 | End: 2017-07-07 | Stop reason: DRUGHIGH

## 2017-06-29 ENCOUNTER — TELEPHONE (OUTPATIENT)
Dept: PODIATRY | Facility: CLINIC | Age: 82
End: 2017-06-29

## 2017-06-29 ENCOUNTER — OFFICE VISIT (OUTPATIENT)
Dept: PODIATRY | Facility: CLINIC | Age: 82
End: 2017-06-29
Payer: MEDICARE

## 2017-06-29 VITALS — DIASTOLIC BLOOD PRESSURE: 59 MMHG | HEART RATE: 84 BPM | SYSTOLIC BLOOD PRESSURE: 137 MMHG

## 2017-06-29 DIAGNOSIS — Z98.890 S/P FOOT SURGERY, LEFT: Primary | ICD-10-CM

## 2017-06-29 PROCEDURE — 99024 POSTOP FOLLOW-UP VISIT: CPT | Performed by: PODIATRIST

## 2017-06-29 NOTE — PROGRESS NOTES
Foot & Ankle Surgery  June 29, 2017    S:  Patient in today approx 3.5 weeks sp partial 5th ray resection by Dr Kirk with revision and closure by myself 2 days later.  Pain levels zero.  In a care facility since discharge.  Would like to go home soon    There were no vitals taken for this visit.      ROS - positive for CC.  Patient denies current nausea, vomiting, chills, fevers, belly pain, calf pain, chest pain or SOB.  Complete remainder of ROS is otherwise neg.    PE - sutures removed.  8 x 2mm full-thickness opening noted without exposed bone or SOI.  Skin shows no trophic, color or temperature changes otherwise.  No calf redness, swelling or pain noted otherwise.    A/P - 89 year old yo patient approx 3.5 weeks sp above procedure; nonhealing surgical wound  -sutures removed  -daily wound care plan:  Wash/dry(no soaking), abx ointment/bandaid and compression(tensogrip dispensed  -has surgical shoe, WBAT  -advised regular ambulation but minimize extra-curricular activities to facilitate healing  -follow up immediately or to ER with deterioration of wound  -order to d/c from care facility to home, as his wife is comfortable with wound care  -ok to return to [facility] for nail care    Follow up  -  2 weeks or sooner with acute issues     There is no height or weight on file to calculate BMI.        Nabil Ann DPM   Podiatric Foot & Ankle Surgeon  [facility]  [phone]

## 2017-06-29 NOTE — TELEPHONE ENCOUNTER
Wilmar Santana PTA from [facility] calls for order. Patient just saw Dr. Ann today. He needs orders for weight bearing status.   Informed that office visit notes states WBAT.   He would like a signed written order faxed to his attn: [phone].     Please see letter pending and sign if appropriate.     Routing to Dr. Ann.     ANNA Davis RN

## 2017-06-29 NOTE — TELEPHONE ENCOUNTER
Letter signed, will fax today.    Nabil Ann DPM   Podiatric Foot & Ankle Surgeon  [facility]

## 2017-06-29 NOTE — MR AVS SNAPSHOT
"              After Visit Summary   6/29/2017    Dustin Pearce    MRN: [id number]           Patient Information     Date Of Birth          1/31/1928        Visit Information        Provider Department      6/29/2017 8:30 AM Nabil Ann DPM [facility]        Today's Diagnoses     S/P foot surgery, left    -  1       Follow-ups after your visit        Follow-up notes from your care team     Return in about 2 weeks (around 7/13/2017).      Your next 10 appointments already scheduled     Jul 24, 2017  3:00 PM CDT   [facility]    [address] [facility] [id number] [phone]           Bring a current list of meds and any records pertaining to this visit.  For Physicals, please bring immunization records and any forms needing to be filled out.  Please arrive 10 minutes early to complete paperwork.              Who to contact     If you have questions or need follow up information about today's clinic visit or your schedule please contact [facility] directly at [phone].  Normal or non-critical lab and imaging results will be communicated to you by Rupturehart, letter or phone within 4 business days after the clinic has received the results. If you do not hear from us within 7 days, please contact the clinic through Rupturehart or phone. If you have a critical or abnormal lab result, we will notify you by phone as soon as possible.  Submit refill requests through Provasculon or call your pharmacy and they will forward the refill request to us. Please allow 3 business days for your refill to be completed.          Additional Information About Your Visit        MyChart Information     Provasculon lets you send messages to your doctor, view your test results, renew your prescriptions, schedule appointments and more. To sign up, go to [email or web address] . Click on \"Log in\" on the left side of the screen, which " "will take you to the Welcome page. Then click on \"Sign up Now\" on the right side of the page.     You will be asked to enter the access code listed below, as well as some personal information. Please follow the directions to create your username and password.     Your access code is: [email or web address]  Expires: 2017 12:00 PM     Your access code will  in 90 days. If you need help or a new code, please call your [place] clinic or [phone].        Care EveryWhere ID     This is your Care EveryWhere ID. This could be used by other organizations to access your [place] medical records  [id number]        Your Vitals Were     Pulse                   84            Blood Pressure from Last 3 Encounters:   17 137/59   17 124/78   17 124/61    Weight from Last 3 Encounters:   17 158 lb 9.6 oz (71.9 kg)   17 138 lb (62.6 kg)   17 158 lb 9.6 oz (71.9 kg)              Today, you had the following     No orders found for display         Today's Medication Changes          These changes are accurate as of: 17  8:58 AM.  If you have any questions, ask your nurse or doctor.               These medicines have changed or have updated prescriptions.        Dose/Directions    metoprolol 50 MG 24 hr tablet   Commonly known as:  TOPROL-XL   This may have changed:  additional instructions   Used for:  Cardiomyopathy (H), Coronary artery disease involving native coronary artery of native heart without angina pectoris        Dose:  50 mg   Take 1 tablet (50 mg) by mouth daily   Quantity:  90 tablet   Refills:  3                Primary Care Provider Office Phone # Fax #    Matt Morrissey [phone]       [facility] [address] [address]        Equal Access to Services     LAURENCE JACOBSON : Shay Brooks, watracey sweeney, qaybta kalavelle kirby, maurice miranda. So [facility] [phone].    ATENCIÓN: Si habla " español, tiene a garvey disposición servicios gratuitos de asistencia lingüística. Jose Luis saha [phone].    We comply with applicable federal civil rights laws and [place] laws. We do not discriminate on the basis of race, color, national origin, age, disability sex, sexual orientation or gender identity.            Thank you!     Thank you for choosing [facility]  for your care. Our goal is always to provide you with excellent care. Hearing back from our patients is one way we can continue to improve our services. Please take a few minutes to complete the written survey that you may receive in the mail after your visit with us. Thank you!             Your Updated Medication List - Protect others around you: Learn how to safely use, store and throw away your medicines at www.disposemymeds.org.          This list is accurate as of: 6/29/17  8:58 AM.  Always use your most recent med list.                   Brand Name Dispense Instructions for use Diagnosis    AMITRIPTYLINE HCL PO      Take 25 mg by mouth nightly as needed for sleep        aspirin 81 MG EC tablet     30 tablet    Take 1 tablet (81 mg) by mouth daily    Transient cerebral ischemia, unspecified type       ATORVASTATIN CALCIUM PO      Take 80 mg by mouth daily        blood glucose monitoring test strip    no brand specified    300 each    Use to test blood sugar three times daily or as directed.    Hypoglycemia       DULoxetine 30 MG EC capsule    CYMBALTA    90 capsule    Take 1 capsule (30 mg) by mouth daily    Polyneuropathy associated with underlying disease (H)       glipiZIDE 10 MG tablet    GLUCOTROL    180 tablet    Take 1 tablet (10 mg) by mouth 2 times daily (before meals)    Type 2 diabetes mellitus with diabetic peripheral angiopathy without gangrene, without long-term current use of insulin (H)       HYDROcodone-acetaminophen 5-325 MG per tablet    NORCO     Take 1 tablet by mouth every 4 hours as needed for moderate to severe pain  Give 1 tablet by mouth every 4 hours as needed for Pain 1-5/10 AND Give 2 tablet by mouth every 4 hours as needed for Pain 6-10/10        ipratropium 0.03 % spray    ATROVENT     Spray 2 sprays into both nostrils every 12 hours        LISINOPRIL PO      Take 2.5 mg by mouth daily Hold if SBP is less than 110. Call NP if held 2x in a row or symptomatic        loperamide 2 MG capsule    IMODIUM     Take 2 mg by mouth 2 times daily as needed for diarrhea BID PRN for loose stool but let NP know if used more than 1x or if stool are VERY LOOSE.        METAMUCIL PO      Take 1 packet by mouth daily as needed        METFORMIN HCL PO      Take 1,000 mg by mouth 2 times daily (with meals)        metoprolol 50 MG 24 hr tablet    TOPROL-XL    90 tablet    Take 1 tablet (50 mg) by mouth daily    Cardiomyopathy (H), Coronary artery disease involving native coronary artery of native heart without angina pectoris       NONFORMULARY      1 packet 2 times daily L-Emental:Arginade Supplement BID for wound healing        omeprazole 40 MG capsule    priLOSEC    90 capsule    Take 1 capsule (40 mg) by mouth daily Take 30-60 minutes before a meal.    Other acute gastritis without hemorrhage       order for DME     1 Device    Equipment being ordered: True Matrix Blood Glucose meter.    Type 2 diabetes mellitus without complication (H)       PLAVIX PO      Take 75 mg by mouth daily    Cough       tamsulosin 0.4 MG capsule    FLOMAX    90 capsule    Take 1 capsule (0.4 mg) by mouth daily    Benign non-nodular prostatic hyperplasia with lower urinary tract symptoms

## 2017-06-29 NOTE — LETTER
[facility]  [address]  [place] [address]  [phone]      June 29, 2017    Dustin Pearce  [address] [address]              Dear Mr. Pearce:    To Whom it May Concern:    Dustin Pearce is a patient under my care. He may bear weight on his left foot as tolerated and is to wear a surgical shoe. Ok for regular ambulation but minimize extra-curricular activities to facilitate healing.        Sincerely,          Nabil Ann DPM

## 2017-06-29 NOTE — Clinical Note
Good morning  I saw Dustin today for follow up on left foot surgery.  He's ok to d/c to home and we started a wound care plan.  He'll follow up in 2 weeks for a recheck.  Thanks  Nabil

## 2017-06-29 NOTE — NURSING NOTE
"Chief Complaint   Patient presents with     Surgical Followup     partial Left 5th ray resection by Dr Kirk with revision and closure by myself 2 days later - DOS 6/4/2017 & 6/6/2017       Initial /59 (BP Location: Left arm, Patient Position: Sitting, Cuff Size: Adult Regular)  Pulse 84 Estimated body mass index is 26.39 kg/(m^2) as calculated from the following:    Height as of 6/22/17: 5' 5\" (1.651 m).    Weight as of 6/27/17: 158 lb 9.6 oz (71.9 kg).  Medication Reconciliation: complete    "

## 2017-07-07 ENCOUNTER — DISCHARGE SUMMARY NURSING HOME (OUTPATIENT)
Dept: GERIATRICS | Facility: CLINIC | Age: 82
End: 2017-07-07
Payer: MEDICARE

## 2017-07-07 ENCOUNTER — CARE COORDINATION (OUTPATIENT)
Dept: CARE COORDINATION | Facility: CLINIC | Age: 82
End: 2017-07-07

## 2017-07-07 VITALS
WEIGHT: 160.6 LBS | TEMPERATURE: 97.9 F | RESPIRATION RATE: 18 BRPM | HEART RATE: 71 BPM | OXYGEN SATURATION: 96 % | BODY MASS INDEX: 26.73 KG/M2 | SYSTOLIC BLOOD PRESSURE: 105 MMHG | DIASTOLIC BLOOD PRESSURE: 62 MMHG

## 2017-07-07 DIAGNOSIS — E08.621 DIABETIC ULCER OF LEFT FOOT ASSOCIATED WITH DIABETES MELLITUS DUE TO UNDERLYING CONDITION, UNSPECIFIED PART OF FOOT, UNSPECIFIED ULCER STAGE (H): ICD-10-CM

## 2017-07-07 DIAGNOSIS — I10 BENIGN ESSENTIAL HYPERTENSION: Primary | ICD-10-CM

## 2017-07-07 DIAGNOSIS — R53.81 PHYSICAL DECONDITIONING: ICD-10-CM

## 2017-07-07 DIAGNOSIS — E11.42 DM TYPE 2 WITH DIABETIC PERIPHERAL NEUROPATHY (H): ICD-10-CM

## 2017-07-07 DIAGNOSIS — I73.9 PVD (PERIPHERAL VASCULAR DISEASE) (H): ICD-10-CM

## 2017-07-07 DIAGNOSIS — L97.529 DIABETIC ULCER OF LEFT FOOT ASSOCIATED WITH DIABETES MELLITUS DUE TO UNDERLYING CONDITION, UNSPECIFIED PART OF FOOT, UNSPECIFIED ULCER STAGE (H): ICD-10-CM

## 2017-07-07 DIAGNOSIS — I48.0 PAROXYSMAL ATRIAL FIBRILLATION (H): ICD-10-CM

## 2017-07-07 DIAGNOSIS — N40.1 BENIGN NON-NODULAR PROSTATIC HYPERPLASIA WITH LOWER URINARY TRACT SYMPTOMS: ICD-10-CM

## 2017-07-07 PROCEDURE — 99316 NF DSCHRG MGMT 30 MIN+: CPT | Performed by: NURSE PRACTITIONER

## 2017-07-07 PROCEDURE — 99207 ZZC CDG-CORRECTLY CODED, REVIEWED AND AGREE: CPT | Performed by: NURSE PRACTITIONER

## 2017-07-07 RX ORDER — METOPROLOL SUCCINATE 25 MG/1
25 TABLET, EXTENDED RELEASE ORAL DAILY
COMMUNITY
End: 2017-09-28

## 2017-07-07 NOTE — PROGRESS NOTES
"  [place] [facility] DISCHARGE SUMMARY    PATIENT'S NAME: Dustin Pearce  YOB: 1928  MEDICAL RECORD NUMBER:  [id number]    PRIMARY CARE PROVIDER AND CLINIC RESPONSIBLE AFTER TRANSFER: Matt Morrissey [facility] [place] [address] / [place] [address]     CODE STATUS/ADVANCE DIRECTIVES DISCUSSION:   CPR/Full code        Allergies   Allergen Reactions     Oxycodone Other (See Comments)     \"TERRIBLE SWEATING\"     Sulfa Drugs      Tetracycline        TRANSFERRING PROVIDERS: ASPEN Drake CNP, Dr. Jennifer MD  DATE OF SNF ADMISSION:  June / 09 / 2017  DATE OF SNF (anticipated) DISCHARGE: July / 09 / 2017  DISCHARGE DISPOSITION: FMG Provider   Nursing Facility: [facility] stay 05/31/17 to 06/09/17.     Condition on Discharge:  Improving.  Function:  Transfers I sup 10-15 feet sba to sup FWW  Cognitive Scores: SLUMS 18/30 and CPT 4.9/5.6    Equipment: walker    DISCHARGE DIAGNOSIS:   1. Benign essential hypertension    2. Diabetic ulcer of left foot associated with diabetes mellitus due to underlying condition, unspecified part of foot, unspecified ulcer stage (H)    3. 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 Physical deconditioning    5. PVD (peripheral vascular disease) (H)    6. Paroxysmal atrial fibrillation (H)    7. Benign non-nodular prostatic hyperplasia with lower urinary tract symptoms        HPI Nursing Facility Course:  HPI information obtained from: facility chart records, facility staff, patient report and [facility] chart review.  Benign essential hypertension  Blood pressures have been low for the last 2 days. Lisinopril and Metoprolol have been held. Today we discontinue the Lisinopril and decreased to Metoprolol succinate 25 mg daily.    Diabetic ulcer of left foot associated with diabetes mellitus due to underlying condition, unspecified part of foot, unspecified ulcer stage (H)  DM " type 2 with diabetic peripheral neuropathy (H)  PVD (peripheral vascular disease) (H)  Primary reason for this admission was for IV antibiotics and strengthening s/p foot surgery.  Denying much pain.  He is ambulating with a walker.  Blood sugar reasonably controlled.  He his demonstrating some cognitive decline given his SLUMS of 18/30.  Continues on Glipizide, Metformin for management of DM 2     Physical deconditioning  Will benefit from continued therapy for strengthening and balance.    Paroxysmal atrial fibrillation (H)  Rate controlled with Metoprolol, ASA and Clopidogrel for stroke and dvt/pe prevention.      Benign non-nodular prostatic hyperplasia with lower urinary tract symptoms  Continues on Finasteride       PAST MEDICAL HISTORY:  has a past medical history of Aortic root dilatation (H); Benign essential hypertension (1/6/2017); Benign non-nodular prostatic hyperplasia with lower urinary tract symptoms (10/20/2016); CAD (coronary artery disease); Cardiomyopathy (H); Carotid artery stenosis (10/20/2016); Carotid occlusion, left; Coronary artery disease involving native coronary artery of native heart without angina pectoris (10/20/2016); Diabetes mellitus (H); DJD (degenerative joint disease); Gastro-oesophageal reflux disease; Gastroesophageal reflux disease without esophagitis (10/20/2016); Heart attack (H); Hyperlipidemia; Hypertension; Mild cognitive impairment (10/20/2016); Nephrolithiasis; Osteopenia; Paroxysmal atrial fibrillation (H); PONV (postoperative nausea and vomiting); and Unspecified cerebral artery occlusion with cerebral infarction. He also has no past medical history of Difficult intubation or Malignant hyperthermia.    DISCHARGE MEDICATIONS:  Current Outpatient Prescriptions   Medication Sig Dispense Refill     metoprolol (TOPROL-XL) 25 MG 24 hr tablet Take 25 mg by mouth daily       NONFORMULARY 1 packet 2 times daily L-Emental:Arginade Supplement  BID for wound healing        HYDROcodone-acetaminophen (NORCO) 5-325 MG per tablet Take 1 tablet by mouth every 4 hours as needed for moderate to severe pain Give 1 tablet by mouth every 4 hours as needed for Pain 1-5/10 AND Give 2 tablet by mouth every 4 hours as needed for Pain 6-10/10       AMITRIPTYLINE HCL PO Take 25 mg by mouth nightly as needed for sleep       ipratropium (ATROVENT) 0.03 % spray Spray 2 sprays into both nostrils every 12 hours       DULoxetine (CYMBALTA) 30 MG EC capsule Take 1 capsule (30 mg) by mouth daily 90 capsule 3     glipiZIDE (GLUCOTROL) 10 MG tablet Take 1 tablet (10 mg) by mouth 2 times daily (before meals) 180 tablet 3     omeprazole (PRILOSEC) 40 MG capsule Take 1 capsule (40 mg) by mouth daily Take 30-60 minutes before a meal. 90 capsule 3     tamsulosin (FLOMAX) 0.4 MG capsule Take 1 capsule (0.4 mg) by mouth daily 90 capsule 3     aspirin EC 81 MG EC tablet Take 1 tablet (81 mg) by mouth daily 30 tablet 0     METFORMIN HCL PO Take 1,000 mg by mouth 2 times daily (with meals)       ATORVASTATIN CALCIUM PO Take 80 mg by mouth daily       blood glucose monitoring (NO BRAND SPECIFIED) test strip Use to test blood sugar three times daily or as directed. 300 each 3     order for DME Equipment being ordered: True Matrix Blood Glucose meter. 1 Device 0     Psyllium (METAMUCIL PO) Take 1 packet by mouth daily as needed        Clopidogrel Bisulfate, [id number], (PLAVIX PO) Take 75 mg by mouth daily          MEDICATION CHANGES/RATIONALE:   See above  Controlled medications sent with patient:Script for Hydrocodone/APAP 5/325 mg wrote for 20 tabs to be given to patient upon d/c.     ROS:    4 point ROS including Respiratory, CV, GI and , other than that noted in the HPI,  is negative    Physical Exam:   Vitals: /62  Pulse 71  Temp 97.9  F (36.6  C)  Resp 18  Wt 160 lb 9.6 oz (72.8 kg)  SpO2 96%  BMI 26.73 kg/m2  BMI= Body mass index is 26.73 kg/(m^2).    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 Left foot has drsg-see below.  ABDOMEN:  normal bowel sounds, soft, nontender.  M/S:   FERNANDEZ equally, left foot has drsg up past ankle. Toes are warm, pink and +CSM  SKIN:  Inspection of skin and subcutaneous tissue baseline, left foot drsg is dry and intact  NEURO:   Cranial nerves 2-12 are  grossly intact and at patient's baseline  PSYCH:  oriented X 3, ?memory impaired , affect and mood normal      DISCHARGE PLAN:  Outpatient Physical Therapy from [facility] in [place]  Patient instructed to follow-up with:  PCP in 7 days      Current [place] scheduled appointments:  Future Appointments  Date Time Provider Department Center   7/7/2017 11:30 AM Stacia Petty, ASPEN CNP [facility]   7/13/2017 8:45 AM Nabil Ann DPM [facility]   7/24/2017 3:00 PM Matt Morrissey MD [facility]       MTM referral needed and placed by this provider: No    Pending labs: none  SNF labs   CBC RESULTS:   Recent Labs   Lab Test 06/13/17 06/09/17   0535  06/08/17   0613   WBC   --   7.9  8.7   RBC   --   3.53*  3.58*   HGB  10.7*  10.8*  10.9*   HCT   --   32.8*  33.2*   MCV   --   93  93   MCH   --   30.6  30.4   MCHC   --   32.9  32.8   RDW   --   13.9  13.6   PLT   --   214  187       Last Basic Metabolic Panel:  Recent Labs   Lab Test 06/13/17 06/09/17   0535  06/08/17   0613   NA  142   --   138   POTASSIUM  4.6   --   3.8   CHLORIDE  107   --   104   ALLISON  9.4   --   8.8   CO2  28   --   28   BUN  20   --   12   CR  0.68*   --   0.62*   GLC  108  162*  141*       Discharge Treatments: none    Time:  greater than 30 minutes spent.    Electronically signed by:  ASPEN Drake CNP

## 2017-07-07 NOTE — LETTER
July 7, 2017      Dustin Pearce  [address] [address] [facility] [address]    Dear Dustin,  I am the Clinic Care Coordinator that works with your primary care provider's clinic. I wanted to introduce myself and provide you with my contact information for you to be able to call me with any questions or concerns. Below is a description of what Clinic Care Coordination is and how I can further assist you.     The Clinic Care Coordinator role is a Registered Nurse and/or  who understands the health care system. The goal of Clinic Care Coordination is to help you manage your health and improve access to the [facility] system in the most efficient manner.  The Registered Nurse can assist you in meeting your health care goals by providing education, coordinating services, and strengthening the communication among your providers. The  can assist you with financial, behavioral, psychosocial, and chemical dependency and counseling/psychiatric resources.    Please feel free to keep this letter and contact information to contact me at [phone] with any further questions or concerns that may arise. We at [facility] are focused on providing you with the highest-quality healthcare experience possible and that all starts with you.       Sincerely,     Elvira Guerra    Enclosed: I have enclosed a copy of a 24 Hour Access Plan. This has helpful phone numbers for you to call when needed. Please keep this in an easy to access place to use as needed.

## 2017-07-07 NOTE — PROGRESS NOTES
Clinic Care Coordination Contact  Care Team Conversations    Received call from [facility] [employer], indicating patient to be d/c'd to home on 07/09/17. Per Ina, patient has progressed wonderfully with therapies and is able to ambulate independently.  Plan for outpatient PT at the [facility] location.    RN CCC to mail intro letter and access plan to patient today, will f/u with phone call to patient in 3-5 business days.     FERNANDO ChavezN, RN, PHN  Clinic Care Coordinator  [facility]  [phone]

## 2017-07-07 NOTE — LETTER
Health Care Home - Access Care Plan    About Me  Patient Name:  Dustin Pearce    YOB: 1928  Age:                            89 year old   [place] MRN:         [id number] Telephone Information:     Home Phone [phone]   Mobile Not on file.       Address:    [address]  [place] [address] Email address:  [email or web address]      Emergency Contact(s)  Name Relationship Lgl Grd Work Phone Home Phone Mobile Phone   1. DOLORES SMYTH Friend  [phone]    2. NSC Other   [phone]              Health Maintenance:      My Access Plan  Medical Emergency 911   Questions or concerns during clinic hours Primary Clinic Line, I will call the clinic directly: Primary Clinic: [facility] [phone]   24 Hour Appointment Line [phone] or  [phone])  (toll free)   24 Hour Nurse Line [phone] (toll free)   Questions or concerns outside clinic hours 24 Hour Appointment Line, I will call the after-hours on-call line:   [facility] [phone] or [phone]) (toll-free)   Preferred Urgent Care     Preferred Hospital Preferred Hospital: [facility]  [phone]   Preferred Pharmacy [facility] [address] [facility] [address] AT [facility] [address] [facility]     Behavioral Health Crisis Line Crisis Connection, [phone] or 911     My Care Team Members  Patient Care Team       Relationship Specialty Notifications Start End    Matt Morrissey MD PCP - General Internal Medicine  12/18/16     Phone: [phone] Pager: [phone] Fax: [phone]        [facility] [address] [address]    Matt Morrissey MD PCP - Internal Medicine Internal Medicine  11/4/16     Phone: [phone] Pager: [phone] Fax: [phone]        [facility] [address] [address]    Elvira Guerra, RN Clinic Care Coordinator Internal Medicine  Admissions 6/19/17     Comment:  PH: [phone]        My Medical and Care Information  Problem List   Patient Active Problem List   Diagnosis     Coronary artery disease involving native coronary artery of native heart without angina pectoris     Mild cognitive impairment     Hyperlipidemia LDL goal <70     Benign non-nodular prostatic hyperplasia with lower urinary tract symptoms     Gastroesophageal reflux disease without esophagitis     Cerebrovascular accident (H)     Carotid artery stenosis     Abdominal aortic aneurysm (H)     Lumbar back pain     Benign essential hypertension     TIA (transient ischemic attack)     Paroxysmal atrial fibrillation (H)     Cardiomyopathy (H)     Aortic root dilatation (H)     Physical deconditioning     Diabetic ulcer of left foot associated with diabetes mellitus due to underlying condition (H)     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

## 2017-07-10 ENCOUNTER — TELEPHONE (OUTPATIENT)
Dept: FAMILY MEDICINE | Facility: CLINIC | Age: 82
End: 2017-07-10

## 2017-07-10 NOTE — TELEPHONE ENCOUNTER
Clinic Care Coordination Contact  Care Team Conversations    Please see Care Coordination encounter for follow up.  FERNANDO ChavezN, RN, PHN  Clinic Care Coordinator  [facility]  [phone]

## 2017-07-12 ENCOUNTER — TELEPHONE (OUTPATIENT)
Dept: FAMILY MEDICINE | Facility: CLINIC | Age: 82
End: 2017-07-12

## 2017-07-12 NOTE — TELEPHONE ENCOUNTER
Panel Management Review      Patient has the following on his problem list:     Depression / Dysthymia review  PHQ-9 SCORE 1/6/2017 1/27/2017   Total Score 17 4      Patient is due for:  None      Composite cancer screening  Chart review shows that this patient is due/due soon for the following None  Summary:    Patient is due/failing the following:   PHQ9    Action needed:   Patient needs to do PHQ9.    Type of outreach:    None, routed to provider for review.    Questions for provider review:    Dr. Morrissey, This patient showed up on our Depression list. Please review Problem List. I am not seeing a diagnosis of Depression and I do not see any depression medications. Would you like a PHQ9 sent to him? Appointment with you on 7/24                                                                                                                                    Gaby Parra CMA (THOR)       Chart routed to Provider .

## 2017-07-14 ENCOUNTER — CARE COORDINATION (OUTPATIENT)
Dept: CARE COORDINATION | Facility: CLINIC | Age: 82
End: 2017-07-14

## 2017-07-14 NOTE — PROGRESS NOTES
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  Discharged from TCU on 07/09/17.  Reports no pain or s/s of infection. Ambulating well.     Current Behavioral Concerns: TCU d/c noted SLUMS 19/30    Education Provided to patient: 24 hour nurse line. Appt reminder   Clinical Pathway Name: None    Medication Management:  TCU d/c mentioned low BP while inpatient.  patinet reporting checking BP daily. Today 122/?  Systolic has stayed below 140, but does not go below 100.   Manages medications independently. Reviewed with patient.     Functional Status:  Mobility Status: Independent w/Device  Equipment Currently Used at Home: cane, straight  Transportation: not assessed d/t patient difficulty hearing.     Psychosocial:  Current living arrangement:: lives in a private home  Financial/Insurance: Medicare/BCBS  No gaps identified     Resources and Interventions:  Current Resources:  (n/a);  (NA)  PAS Number:  (n/a)  Senior Linkage Line Referral Placed:  (n/a)  Advanced Care Plans/Directives on file:: No  Referrals Placed: Senior Linkage Line     Goals: none     Patient/Caregiver understanding: Patient reports he is doing very well. Understands he has a f/u appt with PCP and podiatry.  Requested reminder of appts.   Frequency of Care Coordination:  (Every 2-4 weeks)  Upcoming appointment: 07/24/17     Plan:   RN CCC will f/u with letter to patient with appointment  dates.    Will continue to follow and outreach as indicated.     Elvira Guerra, FERNANDON, RN, PHN  Clinic Care Coordinator  [facility]  [phone]

## 2017-07-14 NOTE — LETTER
[facility] COORDINATION  [address]  [place] [address]  Phone: [phone]        July 14, 2017      Dustin Pearce  [address] [facility] [address]    Dear Dustin,    It was nice talking to you today.   Your upcoming appointments are:  Monday July 24 at 3:00pm with Dr. Morrissey  Thursday July 27 at 9:00am with Dr. Ortez (Podiatry)  Please call [phone] if you need to reschedule these appointments.    Sincerely,        Elvira Guerra

## 2017-07-24 ENCOUNTER — OFFICE VISIT (OUTPATIENT)
Dept: FAMILY MEDICINE | Facility: CLINIC | Age: 82
End: 2017-07-24
Payer: MEDICARE

## 2017-07-24 VITALS
SYSTOLIC BLOOD PRESSURE: 128 MMHG | DIASTOLIC BLOOD PRESSURE: 51 MMHG | BODY MASS INDEX: 27.19 KG/M2 | TEMPERATURE: 97.7 F | HEIGHT: 65 IN | OXYGEN SATURATION: 96 % | HEART RATE: 52 BPM | WEIGHT: 163.2 LBS

## 2017-07-24 DIAGNOSIS — E11.42 DM TYPE 2 WITH DIABETIC PERIPHERAL NEUROPATHY (H): Primary | ICD-10-CM

## 2017-07-24 DIAGNOSIS — M86.9 DIABETIC FOOT ULCER WITH OSTEOMYELITIS (H): ICD-10-CM

## 2017-07-24 DIAGNOSIS — E11.69 DIABETIC FOOT ULCER WITH OSTEOMYELITIS (H): ICD-10-CM

## 2017-07-24 DIAGNOSIS — E11.621 DIABETIC FOOT ULCER WITH OSTEOMYELITIS (H): ICD-10-CM

## 2017-07-24 DIAGNOSIS — I73.9 PVD (PERIPHERAL VASCULAR DISEASE) (H): ICD-10-CM

## 2017-07-24 DIAGNOSIS — L97.509 DIABETIC FOOT ULCER WITH OSTEOMYELITIS (H): ICD-10-CM

## 2017-07-24 DIAGNOSIS — E11.51 TYPE 2 DIABETES MELLITUS WITH DIABETIC PERIPHERAL ANGIOPATHY WITHOUT GANGRENE, WITHOUT LONG-TERM CURRENT USE OF INSULIN (H): ICD-10-CM

## 2017-07-24 LAB — HBA1C MFR BLD: 6.7 % (ref 4.3–6)

## 2017-07-24 PROCEDURE — 36415 COLL VENOUS BLD VENIPUNCTURE: CPT | Performed by: INTERNAL MEDICINE

## 2017-07-24 PROCEDURE — 83036 HEMOGLOBIN GLYCOSYLATED A1C: CPT | Performed by: INTERNAL MEDICINE

## 2017-07-24 PROCEDURE — 99214 OFFICE O/P EST MOD 30 MIN: CPT | Performed by: INTERNAL MEDICINE

## 2017-07-24 NOTE — NURSING NOTE
"Chief Complaint   Patient presents with     Hypertension     follow up     /51  Pulse 52  Temp 97.7  F (36.5  C) (Oral)  Ht 5' 5\" (1.651 m)  Wt 163 lb 3.2 oz (74 kg)  SpO2 96%  BMI 27.16 kg/m2 Estimated body mass index is 27.16 kg/(m^2) as calculated from the following:    Height as of this encounter: 5' 5\" (1.651 m).    Weight as of this encounter: 163 lb 3.2 oz (74 kg).  BP completed using cuff size: regular       Health Maintenance due pending provider review:  NONE    n/a      Cherelle Rain CMA  "

## 2017-07-24 NOTE — MR AVS SNAPSHOT
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)    -  1    PVD (peripheral vascular disease) (H)        Diabetic ulcer of left foot associated with diabetes mellitus due to underlying condition, unspecified part of foot, unspecified ulcer stage (H)           Follow-ups after your visit        Additional Services     DIABETES EDUCATOR REFERRAL       DIABETES SELF MANAGEMENT TRAINING (DSMT)      Your provider has referred you to Diabetes Education: FMG: Diabetes Education - All [facility] [phone]   [email or web address]    Type of training and number of hours: Getting started on Lantus insulin at night     Medicare covers: 10 hours of initial DSMT in 12 month period from the time of first visit, plus 2 hours of follow-up DSMT annually, and additional hours as requested for insulin training.    Diabetes Type: Type 2 - On Insulin             Diabetes Co-Morbidities: atherosclerotic cardiovascular disease               A1C Goal:  <8.0       A1C is: Lab Results       Component                Value               Date                       A1C                      8.3                 04/21/2017              If an urgent visit is needed or A1C is above 12, Care Team to call the Diabetes Education Team at [phone] or send an In Basket message to the Diabetes Education Pool (P DIAB ED-PATIENT CARE).    Diabetes Education Topics: Comprehensive Knowledge Assessment and Instruction and Medication Start: Insulin: Type/Dose/Timing: Lantus 7 units at night; titrate to get glucose less than 130    Special Educational Needs Requiring Individual DSMT: Additional Insulin Training        MEDICAL NUTRITION THERAPY (MNT) for Diabetes    Medical Nutrition Therapy with a Registered Dietitian can be provided in coordination with Diabetes Self-Management Training to assist in achieving optimal diabetes management.    MNT Type and Hours: New diagnosis: Initial MNT - 3 hours                       Medicare will cover: 3 hours initial MNT in 12 month period after first visit, plus 2 hours of follow-up MNT annually    Please be aware that coverage of these services is subject to the terms and limitations of your health insurance plan.  Call member services at your health plan to determine Diabetes Self-Management Training benefits and ask which blood glucose monitor brands are covered by your plan.      Please bring the following with you to your appointment:    (1)  List of current medications   (2)  List of Blood Glucose Monitor brands that are covered by your insurance plan  (3)  Blood Glucose Monitor and log book  (4)   Food records for the 3 days prior to your visit    The Certified Diabetes Educator may make diabetes medication adjustments per the CDE Protocol and Collaborative Practice Agreement.                  Your next 10 appointments already scheduled     Jul 27, 2017  9:00 AM CDT   Return Visit with Nabil Ann DPM   [facility]    [address] [place] [id number] [phone]              Who to contact     If you have questions or need follow up information about today's clinic visit or your schedule please contact [facility] directly at [phone].  Normal or non-critical lab and imaging results will be communicated to you by MyChart, letter or phone within 4 business days after the clinic has received the results. If you do not hear from us within 7 days, please contact the clinic through MyChart or phone. If you have a critical or abnormal lab result, we will notify you by phone as soon as possible.  Submit refill  "requests through Q[employer]io or call your pharmacy and they will forward the refill request to us. Please allow 3 business days for your refill to be completed.          Additional Information About Your Visit        Qardio Information     Qardio lets you send messages to your doctor, view your test results, renew your prescriptions, schedule appointments and more. To sign up, go to [email or web address] . Click on \"Log in\" on the left side of the screen, which will take you to the Welcome page. Then click on \"Sign up Now\" on the right side of the page.     You will be asked to enter the access code listed below, as well as some personal information. Please follow the directions to create your username and password.     Your access code is: [email or web address]  Expires: 2017 12:00 PM     Your access code will  in 90 days. If you need help or a new code, please call your [facility] clinic or [phone].        Care EveryWhere ID     This is your Care EveryWhere ID. This could be used by other organizations to access your [facility] medical records  [id number]        Your Vitals Were     Pulse Temperature Height Pulse Oximetry BMI (Body Mass Index)       52 97.7  F (36.5  C) (Oral) 5' 5\" (1.651 m) 96% 27.16 kg/m2        Blood Pressure from Last 3 Encounters:   17 128/51   17 105/62   17 137/59    Weight from Last 3 Encounters:   17 163 lb 3.2 oz (74 kg)   17 160 lb 9.6 oz (72.8 kg)   17 158 lb 9.6 oz (71.9 kg)              We Performed the Following     DIABETES EDUCATOR REFERRAL          Today's Medication Changes          These changes are accurate as of: 17  3:49 PM.  If you have any questions, ask your nurse or doctor.               Start taking these medicines.        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 Please contact your care team if you have questions.     glipiZIDE 10 MG tablet   Commonly known as:  GLUCOTROL   Stopped by:  Matt Morrissey MD                Where to get your medicines      These medications were sent to [facility] [place], [place] - [address] AT [facility] [address], [facility] [address]    Hours:  24-hours Phone:  [phone]     insulin glargine 100 UNIT/ML injection                Primary Care Provider Office Phone # Fax #    Matt Morrissey [phone]       [facility] [address]  [facility] [address]        Equal Access to Services     [facility]: Hadii aad ku hadasho Soomaali, waaxda luqadaha, qaybta kaalmada adeegyada, waxay idiin hayaan adehelena kharakyle gama . So [facility] [phone].    ATENCIÓN: Si habla español, tiene a garvey disposición servicios gratuitos de asistencia lingüística. [facility] [phone].    We comply with applicable federal civil rights laws and [place] laws. We do not discriminate on the basis of race, color, national origin, age, disability sex, sexual orientation or gender identity.            Thank you!     Thank you for choosing [facility]  for your care. Our goal is always to provide you with excellent care. Hearing back from our patients is one way we can continue to improve our services. Please take a few minutes to complete the written survey that you may receive in the mail after your visit with us. Thank you!             Your Updated Medication List - Protect others around you: Learn how to safely use, store and throw away your medicines at www.disposemymeds.org.          This list is accurate as of: 7/24/17  3:49 PM.  Always use your most recent med list.                   Brand Name Dispense Instructions for use  Diagnosis    AMITRIPTYLINE HCL PO      Take 25 mg by mouth nightly as needed for sleep        aspirin 81 MG EC tablet     30 tablet    Take 1 tablet (81 mg) by mouth daily    Transient cerebral ischemia, unspecified type       ATORVASTATIN CALCIUM PO      Take 80 mg by mouth daily        blood glucose monitoring test strip    no brand specified    300 each    Use to test blood sugar three times daily or as directed.    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    Other acute gastritis without hemorrhage       order for DME     1 Device    Equipment being ordered: True Matrix Blood Glucose meter.    Type 2 diabetes mellitus without complication (H)       PLAVIX PO      Take 75 mg by mouth daily    Cough       tamsulosin 0.4 MG capsule    FLOMAX    90 capsule    Take 1 capsule (0.4 mg) by mouth daily    Benign  non-nodular prostatic hyperplasia with lower urinary tract symptoms

## 2017-07-24 NOTE — PROGRESS NOTES
.  SUBJECTIVE:                                                    Dustin Pearce is a 89 year old male who presents to clinic today for the following health issues:          Hospital Follow-up Visit:    Hospital/Nursing Home/ Rehab Facility: [facility]  Date of Admission: 5/31/2017  Date of Discharge: 7/9/2017  Reason(s) for Admission:             Problems taking medications regularly:  None       Medication changes since discharge: None       Problems adhering to non-medication therapy:  None    Summary of hospitalization:  [facility] discharge summary reviewed  Diagnostic Tests/Treatments reviewed.  Follow up needed: Diabetes control   Other Healthcare Providers Involved in Patient s Care:         Vascular surgery   Update since discharge: improved.     Post Discharge Medication Reconciliation: discharge medications reconciled and changed, per note/orders (see AVS).  Plan of care communicated with patient and family     Coding guidelines for this visit:  Type of Medical   Decision Making Face-to-Face Visit       within 7 Days of discharge Face-to-Face Visit        within 14 days of discharge   Moderate Complexity [id number]   High Complexity 47750 [id number]              He is feeling better since hospital discharge  His home AM sugars are in 160's   He has not had low sugars since adjusting his glipizide dose  He is very reluctant to start insulin  He did get insulin in the hospital  He is getting up to urinate at night more than he would like     Problem list and histories reviewed & adjusted, as indicated.  Additional history: as documented    Patient Active Problem List   Diagnosis     Coronary artery disease involving native coronary artery of native heart without angina pectoris     Mild cognitive impairment     Hyperlipidemia LDL goal <70     Benign non-nodular prostatic hyperplasia with lower urinary tract symptoms      Gastroesophageal reflux disease without esophagitis     Cerebrovascular accident (H)     Carotid artery stenosis     Abdominal aortic aneurysm (H)     Lumbar back pain     Benign essential hypertension     TIA (transient ischemic attack)     Paroxysmal atrial fibrillation (H)     Cardiomyopathy (H)     Aortic root dilatation (H)     Physical deconditioning     Diabetic ulcer of left foot associated with diabetes mellitus due to underlying condition (H)     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)     Anemia due to blood loss, acute     Diarrhea, unspecified type     Nausea     PVD (peripheral vascular disease) (H)     Past Surgical History:   Procedure Laterality Date     AMPUTATE FOOT Left 6/4/2017    Procedure: AMPUTATE FOOT;  Sun Add#3: partial left foot amputation - Sagital Saw - Mini C-Arm;  Surgeon: Moe Kirk DPM;  Location:  OR     CHOLECYSTECTOMY       ESOPHAGOSCOPY, GASTROSCOPY, DUODENOSCOPY (EGD), COMBINED N/A 12/26/2016    Procedure: COMBINED ESOPHAGOSCOPY, GASTROSCOPY, DUODENOSCOPY (EGD);  Surgeon: Nasim Louis MD;  Location:  GI     GENITOURINARY SURGERY      KIDNEY STONE REMOVAL X 4     HC UGI ENDOSCOPY W EUS N/A 12/29/2016    Procedure: COMBINED ENDOSCOPIC ULTRASOUND, ESOPHAGOSCOPY, GASTROSCOPY, DUODENOSCOPY (EGD);  Surgeon: Nasim Louis MD;  Location:  GI     HERNIA REPAIR       INCISION AND DRAINAGE FOOT, COMBINED Left 6/6/2017    Procedure: COMBINED INCISION AND DRAINAGE FOOT;  REVISIONAL LEFT FOOT INCISION AND DRAINAGE WITH POSSIBLE FLAP CLOSURE , ANTIBIOTIC SOLUTION ;  Surgeon: Nabil Ann DPM;  Location:  OR     LASER HOLMIUM LITHOTRIPSY URETER(S), INSERT STENT, COMBINED  3/2/2012    Procedure:COMBINED CYSTOSCOPY, URETEROSCOPY, LASER HOLMIUM LITHOTRIPSY URETER(S), INSERT STENT; CYSTOSCOPY, RIGHT RETROGRADES, RIGHT URETEROSCOPY, HOLMIUM LASER, RIGHT STENT PLACEMENT; Surgeon:ANJELICA LEÓN; Location: OR     ROTATOR CUFF REPAIR RT/LT          Social History   Substance Use Topics     Smoking status: Former Smoker     Packs/day: 1.00     Years: 30.00     Types: Cigarettes     Quit date: 1/1/1999     Smokeless tobacco: Never Used     Alcohol use 4.2 oz/week     7 Standard drinks or equivalent per week      Comment: 1 drink per week     Family History   Problem Relation Age of Onset     Breast Cancer Mother      Heart Failure Father 81         Current Outpatient Prescriptions   Medication Sig Dispense Refill     metoprolol (TOPROL-XL) 25 MG 24 hr tablet Take 25 mg by mouth daily       AMITRIPTYLINE HCL PO Take 25 mg by mouth nightly as needed for sleep       glipiZIDE (GLUCOTROL) 10 MG tablet Take 1 tablet (10 mg) by mouth 2 times daily (before meals) 180 tablet 3     omeprazole (PRILOSEC) 40 MG capsule Take 1 capsule (40 mg) by mouth daily Take 30-60 minutes before a meal. 90 capsule 3     tamsulosin (FLOMAX) 0.4 MG capsule Take 1 capsule (0.4 mg) by mouth daily 90 capsule 3     aspirin EC 81 MG EC tablet Take 1 tablet (81 mg) by mouth daily 30 tablet 0     METFORMIN HCL PO Take 1,000 mg by mouth 2 times daily (with meals)       ATORVASTATIN CALCIUM PO Take 80 mg by mouth daily       blood glucose monitoring (NO BRAND SPECIFIED) test strip Use to test blood sugar three times daily or as directed. 300 each 3     Clopidogrel Bisulfate, [id number], (PLAVIX PO) Take 75 mg by mouth daily        NONFORMULARY 1 packet 2 times daily L-Emental:Arginade Supplement  BID for wound healing       HYDROcodone-acetaminophen (NORCO) 5-325 MG per tablet Take 1 tablet by mouth every 4 hours as needed for moderate to severe pain Give 1 tablet by mouth every 4 hours as needed for Pain 1-5/10 AND Give 2 tablet by mouth every 4 hours as needed for Pain 6-10/10       ipratropium (ATROVENT) 0.03 % spray Spray 2 sprays into both nostrils every 12 hours       DULoxetine (CYMBALTA) 30 MG EC capsule Take 1 capsule (30 mg) by mouth daily 90 capsule 3     order for DME Equipment  "being ordered: True Matrix Blood Glucose meter. 1 Device 0     Psyllium (METAMUCIL PO) Take 1 packet by mouth daily as needed        Allergies   Allergen Reactions     Oxycodone Other (See Comments)     \"TERRIBLE SWEATING\"     Sulfa Drugs      Tetracycline          Reviewed and updated as needed this visit by clinical staffAllergies  Meds       Reviewed and updated as needed this visit by Provider         ROS:  Constitutional, HEENT, cardiovascular, pulmonary, gi and gu systems are negative, except as otherwise noted.      OBJECTIVE:   /51  Pulse 52  Temp 97.7  F (36.5  C) (Oral)  Ht 5' 5\" (1.651 m)  Wt 163 lb 3.2 oz (74 kg)  SpO2 96%  BMI 27.16 kg/m2  Body mass index is 27.16 kg/(m^2).  GENERAL: healthy, alert and no distress  MS: no gross musculoskeletal defects noted, no edema  Left 5 digit amputation site well healed  No residual ulceration     Diagnostic Test Results:  A1c Pending    ASSESSMENT/PLAN:         ICD-10-CM    1. 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 PVD (peripheral vascular disease) (H) I73.9    3. Diabetic ulcer of left foot associated with diabetes mellitus due to underlying condition, unspecified part of foot, unspecified ulcer stage (H) E08.621     L97.529      To avoid future amputations we need better glycemic control  Long discussion about the need to start insulin  He is agreeable  Schedule DIABETES education to learn how to self administer Lantus at bedtime  Stop glipizide, when he starts Lantus  Return after on insulin for one month to review blood sugar log   Cholesterol and blood pressure OK   Follow up with vascular surgery; he also has an AAA that needs surveillance  Continue follow up with podiatry as well     FUTURE APPOINTMENTS:       - Follow-up visit in 4-6 weeks after starting insulin shots; schedule diabetes educator visit as soon as possible      25 " minutes mostly counseling on the need for insulin and appropriate follow up etc       Matt Morrissey MD  [facility]

## 2017-07-25 RX ORDER — GLIPIZIDE 10 MG/1
10 TABLET ORAL
Qty: 180 TABLET | Refills: 3 | COMMUNITY
Start: 2017-07-25 | End: 2018-07-13

## 2017-07-25 NOTE — PROGRESS NOTES
I called Dustin   Suprisingly, his A1c has improved to his goal!  He does not need insulin, I told him this  I need to contact his wife who arranges his medications and appointments to inform her of this news  No need for diabetes education, no need for insulin  Glipizide will be added back to drug list and lantus will be removed  A1c should be rechecked in 6 months

## 2017-07-27 ENCOUNTER — OFFICE VISIT (OUTPATIENT)
Dept: PODIATRY | Facility: CLINIC | Age: 82
End: 2017-07-27
Payer: MEDICARE

## 2017-07-27 VITALS
SYSTOLIC BLOOD PRESSURE: 123 MMHG | BODY MASS INDEX: 27.16 KG/M2 | DIASTOLIC BLOOD PRESSURE: 58 MMHG | WEIGHT: 163 LBS | HEART RATE: 53 BPM | HEIGHT: 65 IN

## 2017-07-27 DIAGNOSIS — Z98.890 S/P FOOT SURGERY, LEFT: Primary | ICD-10-CM

## 2017-07-27 PROCEDURE — 99024 POSTOP FOLLOW-UP VISIT: CPT | Performed by: PODIATRIST

## 2017-07-27 NOTE — PROGRESS NOTES
"Foot & Ankle Surgery  July 27, 2017    S:  Patient in today approx ~ 7.5 weeks sp partial 5th ray resection by Dr Kirk with revision and closure by myself 2 days later. .  Pain levels zero.  The wound is essentially healed, they have not been bandaging for the last 2 weeks.  He previously was measured for DMII shoes prior to his surgery at the [facility].  He's heading there later this morning for his nail trimming    /58 (BP Location: Right arm, Patient Position: Sitting, Cuff Size: Adult Regular)  Pulse 53  Ht 5' 5\" (1.651 m)  Wt 163 lb (73.9 kg)  BMI 27.12 kg/m2      ROS - positive for CC.  Patient denies current nausea, vomiting, chills, fevers, belly pain, calf pain, chest pain or SOB.  Complete remainder of ROS is otherwise neg.    PE - incision healed. Small dry scab at the end of the incision but no wound noted, no swelling, no SOI.  Skin shows no trophic, color or temperature changes otherwise.  No calf redness, swelling or pain noted otherwise.      A/P - 89 year old yo patient approx 7.5 weeks sp above procedure  -no bandaging needed, wound is healed.  -tight glycemic control, close monitoring of feet; follow up immediately with deterioration of foot  -advised he call [facility] and ask to be re-measured/casted, as his foot structure is different    Follow up  -  3 mo for recheck and final films or sooner with acute issues    Plan for [id number] - retired     Body mass index is 27.12 kg/(m^2).          Nabil Ann DPM   Podiatric Foot & Ankle Surgeon  [facility]  [phone]    "

## 2017-07-27 NOTE — MR AVS SNAPSHOT
After Visit Summary   7/27/2017    Dustin Pearce    MRN: [id number]           Patient Information     Date Of Birth          1/31/1928        Visit Information        Provider Department      7/27/2017 9:00 AM Nabil Ann DPM [facility]        Today's Diagnoses     S/P foot surgery, left    -  1       Follow-ups after your visit        Your next 10 appointments already scheduled     Aug 07, 2017  6:00 PM CDT   CAM Extremity with Lesa Marin, PT   [facility] ([facility]  )    [address]  [facility] [address]   [phone]            Aug 14, 2017  6:00 PM CDT   CAM Extremity with Lesa Marin, PT   [place] ([facility]  )    [address]  [facility] [address]   [phone]            Aug 24, 2017  7:30 PM CDT   CAM Extremity with Lesa Marin, PT   [facility] ([facility]  )    [address]  [facility] [address]   [phone]            Aug 31, 2017  7:30 PM CDT   CAM Extremity with Lesa Marin, PT   [facility] ([facility]  )    [address]  [facility] [address]   [phone]            Sep 07, 2017  6:50 PM CDT   CAM Extremity with Lesa Marin, PT   [facility] ([facility]  )    [address]  [facility] [address]   [phone]            Sep 14, 2017  6:50 PM CDT   CAM Extremity with Lesa Marin, PT   [place] ([facility]  )    [address]  [facility] [address]   [phone]            Oct 26, 2017  9:30 AM CDT   Return Visit with Nabil Ann DPM   [facility] ([facility])    [address]  "[facility] [id number] [phone]              Who to contact     If you have questions or need follow up information about today's clinic visit or your schedule please contact [facility] directly at [phone].  Normal or non-critical lab and imaging results will be communicated to you by MyChart, letter or phone within 4 business days after the clinic has received the results. If you do not hear from us within 7 days, please contact the clinic through MyChart or phone. If you have a critical or abnormal lab result, we will notify you by phone as soon as possible.  Submit refill requests through [employer] or call your pharmacy and they will forward the refill request to us. Please allow 3 business days for your refill to be completed.          Additional Information About Your Visit        MyChart Information     [employer] lets you send messages to your doctor, view your test results, renew your prescriptions, schedule appointments and more. To sign up, go to [email or web address] . Click on \"Log in\" on the left side of the screen, which will take you to the Welcome page. Then click on \"Sign up Now\" on the right side of the page.     You will be asked to enter the access code listed below, as well as some personal information. Please follow the directions to create your username and password.     Your access code is: [email or web address]  Expires: 2017 12:00 PM     Your access code will  in 90 days. If you need help or a new code, please call your [facility] or [phone].        Care EveryWhere ID     This is your Care EveryWhere ID. This could be used by other organizations to access your [place] medical records  [id number]        Your Vitals Were     Pulse Height BMI (Body Mass Index)             53 5' 5\" (1.651 m) 27.12 kg/m2          Blood Pressure from Last 3 Encounters:   17 123/58   17 128/51   17 105/62    Weight from Last 3 Encounters:   17 163 lb " (73.9 kg)   07/24/17 163 lb 3.2 oz (74 kg)   07/07/17 160 lb 9.6 oz (72.8 kg)              Today, you had the following     No orders found for display       Primary Care Provider Office Phone # Fax #    Matt Morrissey [phone]       [facility] [address]  [facility] [address]        Equal Access to Services     LAURENCE JACOBSON : Hadii aad ku hadasho Soomaali, waaxda luqadaha, qaybta kaalmada adeegyada, waxay idiin hayaan adeeg kharash la'tina . So [facility] [phone].    ATENCIÓN: Si habla español, tiene a garvey disposición servicios gratuitos de asistencia lingüística. Llame al [phone].    We comply with applicable federal civil rights laws and [place] laws. We do not discriminate on the basis of race, color, national origin, age, disability sex, sexual orientation or gender identity.            Thank you!     Thank you for choosing [facility]  for your care. Our goal is always to provide you with excellent care. Hearing back from our patients is one way we can continue to improve our services. Please take a few minutes to complete the written survey that you may receive in the mail after your visit with us. Thank you!             Your Updated Medication List - Protect others around you: Learn how to safely use, store and throw away your medicines at www.disposemymeds.org.          This list is accurate as of: 7/27/17  9:15 AM.  Always use your most recent med list.                   Brand Name Dispense Instructions for use Diagnosis    AMITRIPTYLINE HCL PO      Take 25 mg by mouth nightly as needed for sleep        aspirin 81 MG EC tablet     30 tablet    Take 1 tablet (81 mg) by mouth daily    Transient cerebral ischemia, unspecified type       ATORVASTATIN CALCIUM PO      Take 80 mg by mouth daily        blood glucose monitoring test strip    no brand specified    300 each    Use to test blood sugar three times daily or as directed.    Hypoglycemia       DULoxetine  30 MG EC capsule    CYMBALTA    90 capsule    Take 1 capsule (30 mg) by mouth daily    Polyneuropathy associated with underlying disease (H)       glipiZIDE 10 MG tablet    GLUCOTROL    180 tablet    Take 1 tablet (10 mg) by mouth 2 times daily (before meals)    Type 2 diabetes mellitus with diabetic peripheral angiopathy without gangrene, without long-term current use of insulin (H)       HYDROcodone-acetaminophen 5-325 MG per tablet    NORCO     Take 1 tablet by mouth every 4 hours as needed for moderate to severe pain Give 1 tablet by mouth every 4 hours as needed for Pain 1-5/10 AND Give 2 tablet by mouth every 4 hours as needed for Pain 6-10/10        ipratropium 0.03 % spray    ATROVENT     Spray 2 sprays into both nostrils every 12 hours        METAMUCIL PO      Take 1 packet by mouth daily as needed        METFORMIN HCL PO      Take 1,000 mg by mouth 2 times daily (with meals)        metoprolol 25 MG 24 hr tablet    TOPROL-XL     Take 25 mg by mouth daily        NONFORMULARY      1 packet 2 times daily L-Emental:Arginade Supplement BID for wound healing        omeprazole 40 MG capsule    priLOSEC    90 capsule    Take 1 capsule (40 mg) by mouth daily Take 30-60 minutes before a meal.    Other acute gastritis without hemorrhage       order for DME     1 Device    Equipment being ordered: True Matrix Blood Glucose meter.    Type 2 diabetes mellitus without complication (H)       PLAVIX PO      Take 75 mg by mouth daily    Cough       tamsulosin 0.4 MG capsule    FLOMAX    90 capsule    Take 1 capsule (0.4 mg) by mouth daily    Benign non-nodular prostatic hyperplasia with lower urinary tract symptoms

## 2017-07-27 NOTE — Clinical Note
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  The wound is now healed, and he'll be measured for new diabetic shoes.  He'll follow up 3 mo for a recheck or sooner with acute issues.  Thanks  Nabil

## 2017-07-27 NOTE — NURSING NOTE
"Chief Complaint   Patient presents with     Surgical Followup     partial Left 5th ray resection by Dr Kirk with revision and closure by myself 2 days later - DOS 6/4/2017 & 6/6/2017       Initial /58 (BP Location: Right arm, Patient Position: Sitting, Cuff Size: Adult Regular)  Pulse 53  Ht 5' 5\" (1.651 m)  Wt 163 lb (73.9 kg)  BMI 27.12 kg/m2 Estimated body mass index is 27.12 kg/(m^2) as calculated from the following:    Height as of this encounter: 5' 5\" (1.651 m).    Weight as of this encounter: 163 lb (73.9 kg).  Medication Reconciliation: complete    "

## 2017-08-04 ENCOUNTER — MEDICAL CORRESPONDENCE (OUTPATIENT)
Dept: HEALTH INFORMATION MANAGEMENT | Facility: CLINIC | Age: 82
End: 2017-08-04

## 2017-08-07 ENCOUNTER — THERAPY VISIT (OUTPATIENT)
Dept: PHYSICAL THERAPY | Facility: CLINIC | Age: 82
End: 2017-08-07
Payer: MEDICARE

## 2017-08-07 DIAGNOSIS — M25.512 ACUTE PAIN OF LEFT SHOULDER: Primary | ICD-10-CM

## 2017-08-07 DIAGNOSIS — R26.89 BALANCE PROBLEMS: ICD-10-CM

## 2017-08-07 DIAGNOSIS — M62.81 GENERALIZED MUSCLE WEAKNESS: ICD-10-CM

## 2017-08-07 PROCEDURE — G8979 MOBILITY GOAL STATUS: HCPCS | Mod: GP | Performed by: PHYSICAL THERAPIST

## 2017-08-07 PROCEDURE — 97161 PT EVAL LOW COMPLEX 20 MIN: CPT | Mod: GP | Performed by: PHYSICAL THERAPIST

## 2017-08-07 PROCEDURE — G8978 MOBILITY CURRENT STATUS: HCPCS | Mod: GP | Performed by: PHYSICAL THERAPIST

## 2017-08-07 PROCEDURE — 97110 THERAPEUTIC EXERCISES: CPT | Mod: GP | Performed by: PHYSICAL THERAPIST

## 2017-08-07 NOTE — PROGRESS NOTES
Subjective:    Patient is a 89 year old male presenting with rehab general hpi. The history is provided by the patient.   Dustin Pearce is a 89 year old male with weakness condition which occurred with .      This is a new condition  Patient reports having a surgery on his L foot due to an infection.  Ended up removing the 5th toe due to the infection not clearing.  He was NWB on the L for 4 weeks after surgery and was in a care facility for 5 weeks after surgery.  He had been using a cane for walking at times prior to the surgery but has been using it at all times since his surgery.  He had 1 fall in the care facility when he was trying to bend over to touch his toes.  Has not had any falls since being home but had an incident where he nearly fell and caught himself injuring his L shoulder on 07/21/2017.  Saw MD who ordered PT for this also.  He lives with his friend in a house with 2 sets of 7 steps which he does non-reciprocally with his cane and a railing.    Site of Pain: L shoulder--lateral.  Pain is described as sharp and aching     and is intermittent and reported as 4/10. Associated symptoms:  Loss of strength. Pain is the same all the time.  Exacerbated by: reaching overhead and out to the side, uses cane at all times for ambulation, non-reciprocal pattern on stairs with cane and railing. and relieved by nothing. Since onset symptoms are gradually improving (L shoulder, balance unchanged). Special testing: none.    Previous treatment includes physical therapy (in care center). There was moderate improvement following previous treatment.     Pertinent medical history includes:  Stroke, cancer, diabetes and other (headaches, dizziness).Medical allergies: no.Surgical history: gallbladder, kidney stones.Medication history: none reported.  Current occupation is Retired.          Barriers include:  Stairs.    Barriers to activity include. Pt has been diagnosed with Type 2 diabetes.      Red flags: none.                       Objective:      Gait:  Altered pattern due to decreased otto, decreased step-lengths, uses SEC, slight shuffling pattern  Assistive Devices:  Cane                           Shoulder Evaluation:  ROM:  AROM:  normal (pain with midrange abduction)                                  Strength:    Flexion: Left:5/5    Pain: -/+        Abduction:  Left: 4+/5   Pain:+        Internal Rotation:  Left:5/5      Pain:-      External Rotation:   Left:4+/5      Pain:+                                                          General Evaluation:        Lower Extremity Strength:    Knee Extension: 5    Pain: -  Knee Flexion: 5    Pain: -  Ankle Plantarflexion: 4-    Pain: -  Ankle Dorsiflexion: 4-    Pain: -              Balance:    Single Leg Stance--Eyes Open:  Left: 1/30 sec    Right: 1/30 sec      Sit to Stand Test: 9/reps                                               ROS    Assessment/Plan:      Patient is a 89 year old male with left side shoulder and balance/weakness complaints.  L shoulder pain consistent with a RC strain and possibly tear.  He has good AROM, and strength is good except for pain and weakness with abduction and ER.  Treatment for the shoulder will focus on scapular and rotator cuff strengthening exercises to improve mechanics, decrease pain, and improve overall mobility and function.  Balance and weakness issues have arisen from toe amputation and care center stay.  He has good LE strength with MMT but demonstrates definite weakness with functional activities and mobility.  He would benefit from general LE strengthening and balance exercises to improve mobility and stability with gait.      Patient has the following significant findings with corresponding treatment plan.                Diagnosis 1:  L shoulder pain  Pain -  self management, education and home program  Decreased strength - therapeutic exercise, therapeutic activities and home program  Decreased function - therapeutic activities  and home program  Impaired posture - neuro re-education and home program  Diagnosis 2:  Weakness/balance issues   Pain -  self management, education and home program  Decreased strength - therapeutic exercise, therapeutic activities and home program  Impaired balance - neuro re-education, therapeutic activities and home program  Impaired gait - gait training and home program  Decreased function - therapeutic activities and home program  Impaired posture - neuro re-education and home program    Therapy Evaluation Codes:   1) History comprised of:   Personal factors that impact the plan of care:      None.    Comorbidity factors that impact the plan of care are:      Diabetes and Smoking.     Medications impacting care: None.  2) Examination of Body Systems comprised of:   Body structures and functions that impact the plan of care:      Shoulder and LE weakness.   Activity limitations that impact the plan of care are:      Walking and reaching, stairs.  3) Clinical presentation characteristics are:   Stable/Uncomplicated.  4) Decision-Making    Low complexity using standardized patient assessment instrument and/or measureable assessment of functional outcome.  Cumulative Therapy Evaluation is: Low complexity.    Previous and current functional limitations:  (See Goal Flow Sheet for this information)    Short term and Long term goals: (See Goal Flow Sheet for this information)     Communication ability:  Patient appears to be able to clearly communicate and understand verbal and written communication and follow directions correctly.  Treatment Explanation - The following has been discussed with the patient:   RX ordered/plan of care  Anticipated outcomes  Possible risks and side effects  This patient would benefit from PT intervention to resume normal activities.   Rehab potential is good.    Frequency:  1 X week, once daily  Duration:  for 8 weeks  Discharge Plan:  Achieve all LTG.  Independent in home treatment  program.  Reach maximal therapeutic benefit.    Please refer to the daily flowsheet for treatment today, total treatment time and time spent performing 1:1 timed codes.

## 2017-08-07 NOTE — MR AVS SNAPSHOT
After Visit Summary   8/7/2017    Dustin Pearce    MRN: [id number]           Patient Information     Date Of Birth          1/31/1928        Visit Information        Provider Department      8/7/2017 6:00 PM Lesa Marin, PT [place] [facility]        Today's Diagnoses     Acute pain of left shoulder    -  1    Balance problems        Generalized muscle weakness           Follow-ups after your visit        Your next 10 appointments already scheduled     Aug 14, 2017  6:00 PM CDT   CAM Extremity with Lesa Marin PT   [place] [facility] ([facility]  )    [address]  [facility] [address]   [phone]            Aug 24, 2017  7:30 PM CDT   CAM Extremity with Lesa Marin PT   [place] [facility] ([facility]  )    [address]  [facility] [address]   [phone]            Aug 31, 2017  7:30 PM CDT   CAM Extremity with Lesa Marin PT   [place] [facility] ([facility]  )    [address]  [facility] [address]   [phone]            Sep 07, 2017  6:50 PM CDT   CAM Extremity with Lesa Marin PT   [place] [facility] ([facility]  )    [address]  [facility] [address]   [phone]            Sep 14, 2017  6:50 PM CDT   CAM Extremity with Lesa Marin PT   [place] [facility] ([facility]  )    [address]  [facility] [address]   [phone]            Oct 26, 2017  9:30 AM CDT   Return Visit with Nabil Ann DPM   [facility] ([facility])    [address] [facility] [address] [address] [phone]              Who to contact     If you have questions or need follow up information about today's clinic  "visit or your schedule please contact [facility] directly at [phone].  Normal or non-critical lab and imaging results will be communicated to you by [employer], letter or phone within 4 business days after the clinic has received the results. If you do not hear from us within 7 days, please contact the clinic through [employer] or phone. If you have a critical or abnormal lab result, we will notify you by phone as soon as possible.  Submit refill requests through [employer] or call your pharmacy and they will forward the refill request to us. Please allow 3 business days for your refill to be completed.          Additional Information About Your Visit        [employer] Information     [employer] lets you send messages to your doctor, view your test results, renew your prescriptions, schedule appointments and more. To sign up, go to [email or web address] . Click on \"Log in\" on the left side of the screen, which will take you to the Welcome page. Then click on \"Sign up Now\" on the right side of the page.     You will be asked to enter the access code listed below, as well as some personal information. Please follow the directions to create your username and password.     Your access code is: [email or web address]  Expires: 2017 12:00 PM     Your access code will  in 90 days. If you need help or a new code, please call your [place] clinic or [phone].        Care EveryWhere ID     This is your Care EveryWhere ID. This could be used by other organizations to access your [place] medical records  [id number]         Blood Pressure from Last 3 Encounters:   17 123/58   17 128/51   17 105/62    Weight from Last 3 Encounters:   17 73.9 kg (163 lb)   17 74 kg (163 lb 3.2 oz)   17 72.8 kg (160 lb 9.6 oz)              We Performed the Following     CAM CERT REPORT     CAM Inital Eval Report     PT Eval, Low Complexity (32182)     Therapeutic " Exercises        Primary Care Provider Office Phone # Fax #    Matt Morrissey [phone]       [facility] [address] [place] [address]        Equal Access to Services     LAURENCE JACOBSON : Hadii kinza ku hadmoriso Soomaali, waaxda luqadaha, qaybta kaalmada adeegyada, maurice begumn bradly abreu natan miranda. So [facility] [phone].    ATENCIÓN: Si habla español, tiene a garvey disposición servicios gratuitos de asistencia lingüística. Llame al [phone].    We comply with applicable federal civil rights laws and [place] laws. We do not discriminate on the basis of race, color, national origin, age, disability sex, sexual orientation or gender identity.            Thank you!     Thank you for choosing [facility]  for your care. Our goal is always to provide you with excellent care. Hearing back from our patients is one way we can continue to improve our services. Please take a few minutes to complete the written survey that you may receive in the mail after your visit with us. Thank you!             Your Updated Medication List - Protect others around you: Learn how to safely use, store and throw away your medicines at www.disposemymeds.org.          This list is accurate as of: 8/7/17  7:02 PM.  Always use your most recent med list.                   Brand Name Dispense Instructions for use Diagnosis    AMITRIPTYLINE HCL PO      Take 25 mg by mouth nightly as needed for sleep        aspirin 81 MG EC tablet     30 tablet    Take 1 tablet (81 mg) by mouth daily    Transient cerebral ischemia, unspecified type       ATORVASTATIN CALCIUM PO      Take 80 mg by mouth daily        blood glucose monitoring test strip    no brand specified    300 each    Use to test blood sugar three times daily or as directed.    Hypoglycemia       DULoxetine 30 MG EC capsule    CYMBALTA    90 capsule    Take 1 capsule (30 mg) by mouth daily    Polyneuropathy  associated with underlying disease (H)       glipiZIDE 10 MG tablet    GLUCOTROL    180 tablet    Take 1 tablet (10 mg) by mouth 2 times daily (before meals)    Type 2 diabetes mellitus with diabetic peripheral angiopathy without gangrene, without long-term current use of insulin (H)       HYDROcodone-acetaminophen 5-325 MG per tablet    NORCO     Take 1 tablet by mouth every 4 hours as needed for moderate to severe pain Give 1 tablet by mouth every 4 hours as needed for Pain 1-5/10 AND Give 2 tablet by mouth every 4 hours as needed for Pain 6-10/10        ipratropium 0.03 % spray    ATROVENT     Spray 2 sprays into both nostrils every 12 hours        METAMUCIL PO      Take 1 packet by mouth daily as needed        METFORMIN HCL PO      Take 1,000 mg by mouth 2 times daily (with meals)        metoprolol 25 MG 24 hr tablet    TOPROL-XL     Take 25 mg by mouth daily        NONFORMULARY      1 packet 2 times daily L-Emental:Arginade Supplement BID for wound healing        omeprazole 40 MG capsule    priLOSEC    90 capsule    Take 1 capsule (40 mg) by mouth daily Take 30-60 minutes before a meal.    Other acute gastritis without hemorrhage       order for DME     1 Device    Equipment being ordered: True Matrix Blood Glucose meter.    Type 2 diabetes mellitus without complication (H)       PLAVIX PO      Take 75 mg by mouth daily    Cough       tamsulosin 0.4 MG capsule    FLOMAX    90 capsule    Take 1 capsule (0.4 mg) by mouth daily    Benign non-nodular prostatic hyperplasia with lower urinary tract symptoms

## 2017-08-07 NOTE — LETTER
DEPARTMENT OF HEALTH AND HUMAN SERVICES  CENTERS FOR MEDICARE & MEDICAID SERVICES    PLAN/UPDATED PLAN OF PROGRESS FOR OUTPATIENT REHABILITATION    PATIENTS NAME:  Dustin Pearce   : 1928  PR[facility] NUMBER:    [id number]  [id number]  [facility]  MEDICAL RECORD NUMBER: [id number]   START OF CARE DATE:  SOC Date: 17   TYPE:  PT  PRIMARY/TREATMENT DIAGNOSIS: (Pertinent Medical Diagnosis)     Acute pain of left shoulder  Balance problems  Generalized muscle weakness    VISITS FROM START OF CARE:  Rxs Used: 1     Subjective:  Dustin Pearce is a 89 year old male with weakness condition which occurred with .      This is a new condition  Patient reports having a surgery on his L foot due to an infection.  Ended up removing the 5th toe due to the infection not clearing.  He was NWB on the L for 4 weeks after surgery and was in a care facility for 5 weeks after surgery.  He had been using a cane for walking at times prior to the surgery but has been using it at all times since his surgery.  He had 1 fall in the care facility when he was trying to bend over to touch his toes.  Has not had any falls since being home but had an incident where he nearly fell and caught himself injuring his L shoulder on 2017.  Saw MD who ordered PT for this also.  He lives with his friend in a house with 2 sets of 7 steps which he does non-reciprocally with his cane and a railing.    Site of Pain: L shoulder--lateral.  Pain is described as sharp and aching     and is intermittent and reported as 4/10. Associated symptoms:  Loss of strength. Pain is the same all the time.  Exacerbated by: reaching overhead and out to the side, uses cane at all times for ambulation, non-reciprocal pattern on stairs with cane and railing. and relieved by nothing. Since onset symptoms are gradually improving (L shoulder, balance unchanged). Special testing: none.  Previous  treatment includes physical therapy (in care center). There was moderate improvement following previous treatment.  Pertinent medical history includes:  Stroke, cancer, diabetes and other (headaches, dizziness).  Medical allergies: no.Surgical history: gallbladder, kidney stones.Medication history: none reported.  Current occupation is Retired.  Barriers include:  Stairs.  Barriers to activity include. Pt has been diagnosed with Type 2 diabetes.  Red flags: none.    Objective:  Gait:  Altered pattern due to decreased otto, decreased step-lengths, uses SEC, slight shuffling pattern  Assistive Devices:  Cane  Shoulder Evaluation:  ROM:  AROM:  normal (pain with midrange abduction)  Strength:    Flexion: Left:5/5    Pain: -/+      Abduction:  Left: 4+/5   Pain:+      Internal Rotation:  Left:5/5      Pain:-      External Rotation:   Left:4+/5      Pain:+         General Evaluation:  Lower Extremity Strength:    Knee Extension: 5    Pain: -  Knee Flexion: 5    Pain: -  Ankle Plantarflexion: 4-    Pain: -  Ankle Dorsiflexion: 4-    Pain: -  Balance:    Single Leg Stance--Eyes Open:  Left: 1/30 sec    Right: 1/30 sec  Sit to Stand Test: 9/reps    Assessment/Plan:    Patient is a 89 year old male with left side shoulder and balance/weakness complaints.  L shoulder pain consistent with a RC strain and possibly tear.  He has good AROM, and strength is good except for pain and weakness with abduction and ER.  Treatment for the shoulder will focus on scapular and rotator cuff strengthening exercises to improve mechanics, decrease pain, and improve overall mobility and function.  Balance and weakness issues have arisen from toe amputation and care center stay.  He has good LE strength with MMT but demonstrates definite weakness with functional activities and mobility.  He would benefit from general LE strengthening and balance exercises to improve mobility and stability with gait.      Patient has the following significant  findings with corresponding treatment plan.                Diagnosis 1:  L shoulder pain  Pain -  self management, education and home program  Decreased strength - therapeutic exercise, therapeutic activities and home program  Decreased function - therapeutic activities and home program  Impaired posture - neuro re-education and home program  Diagnosis 2:  Weakness/balance issues   Pain -  self management, education and home program  Decreased strength - therapeutic exercise, therapeutic activities and home program  Impaired balance - neuro re-education, therapeutic activities and home program  Impaired gait - gait training and home program  Decreased function - therapeutic activities and home program  Impaired posture - neuro re-education and home program    Therapy Evaluation Codes:   1) History comprised of:   Personal factors that impact the plan of care:      None.    Comorbidity factors that impact the plan of care are:      Diabetes and Smoking.     Medications impacting care: None.  2) Examination of Body Systems comprised of:   Body structures and functions that impact the plan of care:      Shoulder and LE weakness.   Activity limitations that impact the plan of care are:      Walking and reaching, stairs.  3) Clinical presentation characteristics are:   Stable/Uncomplicated.  4) Decision-Making    Low complexity using standardized patient assessment instrument and/or   measureable assessment of functional outcome.  Cumulative Therapy Evaluation is: Low complexity.    Previous and current functional limitations:  (See Goal Flow Sheet for this information)    Short term and Long term goals: (See Goal Flow Sheet for this information)   Communication ability:  Patient appears to be able to clearly communicate and understand verbal and written communication and follow directions correctly.  Treatment Explanation - The following has been discussed with the patient:   RX ordered/plan of care  Anticipated  "outcomes  Possible risks and side effects  This patient would benefit from PT intervention to resume normal activities.   Rehab potential is good.    Frequency:  1 X week, once daily  Duration:  for 8 weeks  Discharge Plan:  Achieve all LTG.  Independent in home treatment program.  Reach maximal therapeutic benefit.    Caregiver Signature/Credentials _____________________________ Date ________       Treating Provider: Lesa Marin, PT   I have reviewed and certified the need for these services and plan of treatment while under my care.        PHYSICIAN'S SIGNATURE:   _________________________________  Date___________     Alexandre Krishnan    Certification period:  Beginning of Cert date period: 08/07/17 to  End of Cert period date: 11/04/17     Functional Level Progress Report: Please see attached \"Goal Flow sheet for Functional level.\"    ____X____ Continue Services or       ________ DC Services                Service dates: From  SOC Date: 08/07/17 date to present                         "

## 2017-08-14 ENCOUNTER — THERAPY VISIT (OUTPATIENT)
Dept: PHYSICAL THERAPY | Facility: CLINIC | Age: 82
End: 2017-08-14
Payer: MEDICARE

## 2017-08-14 DIAGNOSIS — M62.81 GENERALIZED MUSCLE WEAKNESS: ICD-10-CM

## 2017-08-14 DIAGNOSIS — R26.89 BALANCE PROBLEMS: ICD-10-CM

## 2017-08-14 DIAGNOSIS — M25.512 ACUTE PAIN OF LEFT SHOULDER: ICD-10-CM

## 2017-08-14 PROCEDURE — 97112 NEUROMUSCULAR REEDUCATION: CPT | Mod: GP | Performed by: PHYSICAL THERAPIST

## 2017-08-14 PROCEDURE — 97110 THERAPEUTIC EXERCISES: CPT | Mod: GP | Performed by: PHYSICAL THERAPIST

## 2017-08-24 ENCOUNTER — THERAPY VISIT (OUTPATIENT)
Dept: PHYSICAL THERAPY | Facility: CLINIC | Age: 82
End: 2017-08-24
Payer: MEDICARE

## 2017-08-24 DIAGNOSIS — M25.512 ACUTE PAIN OF LEFT SHOULDER: ICD-10-CM

## 2017-08-24 DIAGNOSIS — M62.81 GENERALIZED MUSCLE WEAKNESS: ICD-10-CM

## 2017-08-24 DIAGNOSIS — R26.89 BALANCE PROBLEMS: ICD-10-CM

## 2017-08-24 PROCEDURE — 97112 NEUROMUSCULAR REEDUCATION: CPT | Mod: GP | Performed by: PHYSICAL THERAPIST

## 2017-08-24 PROCEDURE — 97110 THERAPEUTIC EXERCISES: CPT | Mod: GP | Performed by: PHYSICAL THERAPIST

## 2017-08-31 ENCOUNTER — THERAPY VISIT (OUTPATIENT)
Dept: PHYSICAL THERAPY | Facility: CLINIC | Age: 82
End: 2017-08-31
Payer: MEDICARE

## 2017-08-31 DIAGNOSIS — R26.89 BALANCE PROBLEMS: ICD-10-CM

## 2017-08-31 DIAGNOSIS — M25.512 ACUTE PAIN OF LEFT SHOULDER: ICD-10-CM

## 2017-08-31 DIAGNOSIS — M62.81 GENERALIZED MUSCLE WEAKNESS: ICD-10-CM

## 2017-08-31 PROCEDURE — 97110 THERAPEUTIC EXERCISES: CPT | Mod: GP | Performed by: PHYSICAL THERAPIST

## 2017-08-31 PROCEDURE — 97112 NEUROMUSCULAR REEDUCATION: CPT | Mod: GP | Performed by: PHYSICAL THERAPIST

## 2017-09-07 ENCOUNTER — THERAPY VISIT (OUTPATIENT)
Dept: PHYSICAL THERAPY | Facility: CLINIC | Age: 82
End: 2017-09-07
Payer: MEDICARE

## 2017-09-07 DIAGNOSIS — M25.512 ACUTE PAIN OF LEFT SHOULDER: ICD-10-CM

## 2017-09-07 DIAGNOSIS — M62.81 GENERALIZED MUSCLE WEAKNESS: ICD-10-CM

## 2017-09-07 DIAGNOSIS — R26.89 BALANCE PROBLEMS: ICD-10-CM

## 2017-09-07 PROCEDURE — 97112 NEUROMUSCULAR REEDUCATION: CPT | Mod: GP | Performed by: PHYSICAL THERAPIST

## 2017-09-07 PROCEDURE — 97110 THERAPEUTIC EXERCISES: CPT | Mod: GP | Performed by: PHYSICAL THERAPIST

## 2017-09-14 ENCOUNTER — THERAPY VISIT (OUTPATIENT)
Dept: PHYSICAL THERAPY | Facility: CLINIC | Age: 82
End: 2017-09-14
Payer: MEDICARE

## 2017-09-14 DIAGNOSIS — M25.512 ACUTE PAIN OF LEFT SHOULDER: ICD-10-CM

## 2017-09-14 DIAGNOSIS — R26.89 BALANCE PROBLEMS: ICD-10-CM

## 2017-09-14 DIAGNOSIS — M62.81 GENERALIZED MUSCLE WEAKNESS: ICD-10-CM

## 2017-09-14 PROCEDURE — 97110 THERAPEUTIC EXERCISES: CPT | Mod: GP | Performed by: PHYSICAL THERAPIST

## 2017-09-14 PROCEDURE — 97112 NEUROMUSCULAR REEDUCATION: CPT | Mod: GP | Performed by: PHYSICAL THERAPIST

## 2017-09-15 ENCOUNTER — TELEPHONE (OUTPATIENT)
Dept: FAMILY MEDICINE | Facility: CLINIC | Age: 82
End: 2017-09-15

## 2017-09-15 NOTE — TELEPHONE ENCOUNTER
Reason for Call:  Other    Detailed comments: Noni from [facility] asking for a form they sent  On 08/04/2017 be sent back  Fax:  [phone]  Phone:  [phone]  Ext 1    Best Time: anytime    Can we leave a detailed message on this number? YES    Call taken on 9/15/2017 at 10:19 AM by Brooke Collier

## 2017-09-21 ENCOUNTER — THERAPY VISIT (OUTPATIENT)
Dept: PHYSICAL THERAPY | Facility: CLINIC | Age: 82
End: 2017-09-21
Payer: MEDICARE

## 2017-09-21 DIAGNOSIS — M25.512 ACUTE PAIN OF LEFT SHOULDER: ICD-10-CM

## 2017-09-21 DIAGNOSIS — R26.89 BALANCE PROBLEMS: ICD-10-CM

## 2017-09-21 DIAGNOSIS — M62.81 GENERALIZED MUSCLE WEAKNESS: ICD-10-CM

## 2017-09-21 PROCEDURE — 97110 THERAPEUTIC EXERCISES: CPT | Mod: GP | Performed by: PHYSICAL THERAPIST

## 2017-09-21 PROCEDURE — 97112 NEUROMUSCULAR REEDUCATION: CPT | Mod: GP | Performed by: PHYSICAL THERAPIST

## 2017-09-28 ENCOUNTER — THERAPY VISIT (OUTPATIENT)
Dept: PHYSICAL THERAPY | Facility: CLINIC | Age: 82
End: 2017-09-28
Payer: MEDICARE

## 2017-09-28 ENCOUNTER — OFFICE VISIT (OUTPATIENT)
Dept: FAMILY MEDICINE | Facility: CLINIC | Age: 82
End: 2017-09-28
Payer: MEDICARE

## 2017-09-28 ENCOUNTER — TELEPHONE (OUTPATIENT)
Dept: FAMILY MEDICINE | Facility: CLINIC | Age: 82
End: 2017-09-28

## 2017-09-28 DIAGNOSIS — M62.81 GENERALIZED MUSCLE WEAKNESS: ICD-10-CM

## 2017-09-28 DIAGNOSIS — I42.9 CARDIOMYOPATHY, UNSPECIFIED TYPE (H): ICD-10-CM

## 2017-09-28 DIAGNOSIS — R42 DIZZY SPELLS: Primary | ICD-10-CM

## 2017-09-28 DIAGNOSIS — M25.512 ACUTE PAIN OF LEFT SHOULDER: ICD-10-CM

## 2017-09-28 DIAGNOSIS — R26.89 BALANCE PROBLEMS: ICD-10-CM

## 2017-09-28 LAB — HGB BLD-MCNC: 12.7 G/DL (ref 13.3–17.7)

## 2017-09-28 PROCEDURE — 97110 THERAPEUTIC EXERCISES: CPT | Mod: GP | Performed by: PHYSICAL THERAPIST

## 2017-09-28 PROCEDURE — 36415 COLL VENOUS BLD VENIPUNCTURE: CPT | Performed by: NURSE PRACTITIONER

## 2017-09-28 PROCEDURE — 85018 HEMOGLOBIN: CPT | Performed by: NURSE PRACTITIONER

## 2017-09-28 PROCEDURE — G8983 BODY POS D/C STATUS: HCPCS | Mod: GP | Performed by: PHYSICAL THERAPIST

## 2017-09-28 PROCEDURE — 99214 OFFICE O/P EST MOD 30 MIN: CPT | Performed by: NURSE PRACTITIONER

## 2017-09-28 PROCEDURE — G8982 BODY POS GOAL STATUS: HCPCS | Mod: GP | Performed by: PHYSICAL THERAPIST

## 2017-09-28 PROCEDURE — 97112 NEUROMUSCULAR REEDUCATION: CPT | Mod: GP | Performed by: PHYSICAL THERAPIST

## 2017-09-28 RX ORDER — METOPROLOL SUCCINATE 25 MG/1
12.5 TABLET, EXTENDED RELEASE ORAL DAILY
Qty: 45 TABLET | Refills: 0 | Status: SHIPPED | OUTPATIENT
Start: 2017-09-28 | End: 2017-10-26

## 2017-09-28 NOTE — PROGRESS NOTES
"HPI      SUBJECTIVE:   Dustin Pearce is a 89 year old male who presents to clinic today for the following health issues:      Dizziness Follow up    Worse in the last 3 months.  Pt has been blacking out with dizzy spells. Saturday, he blacked out and fell. Dizzy spells every time he stands up.    When he was at the [facility] several months ago he started with falls.   Dizzy spells for a long time but is getting worse   The morning time is usually worse. -170 in the morning   Last episode was 5 days ago. Got a rug burn from this fall but otherwise no injury. Never has hit his head.   He gets an instant headache and then \"something is moving\"--unknown room spinning--then he goes down. Nearly every time he gets the HA of the back of the head   Always feels better after sitting   The dizziness usually comes on pretty quickly as he goes to stand from sitting   No palpitations, CP, SOB, change of vision (has bad vision at baseline)   No diarrhea, abd pain, urination changes       Past Medical History:   Diagnosis Date     Aortic root dilatation (H)      Benign essential hypertension 1/6/2017     Benign non-nodular prostatic hyperplasia with lower urinary tract symptoms 10/20/2016     CAD (coronary artery disease)      Cardiomyopathy (H)      Carotid artery stenosis 10/20/2016     Carotid occlusion, left      Coronary artery disease involving native coronary artery of native heart without angina pectoris 10/20/2016     Diabetes mellitus (H)      DJD (degenerative joint disease)      Gastro-oesophageal reflux disease      Gastroesophageal reflux disease without esophagitis 10/20/2016     Heart attack (H)      Hyperlipidemia      Hypertension      Mild cognitive impairment 10/20/2016     Nephrolithiasis     RECURRENT     Osteopenia      Paroxysmal atrial fibrillation (H)      PONV (postoperative nausea and vomiting)      Unspecified cerebral artery occlusion with cerebral infarction      Past Surgical History:   Procedure " Laterality Date     AMPUTATE FOOT Left 6/4/2017    Procedure: AMPUTATE FOOT;  Sun Add#3: partial left foot amputation - Sagital Saw - Mini C-Arm;  Surgeon: Moe Kirk DPM;  Location:  OR     CHOLECYSTECTOMY       ESOPHAGOSCOPY, GASTROSCOPY, DUODENOSCOPY (EGD), COMBINED N/A 12/26/2016    Procedure: COMBINED ESOPHAGOSCOPY, GASTROSCOPY, DUODENOSCOPY (EGD);  Surgeon: Nasim Louis MD;  Location:  GI     GENITOURINARY SURGERY      KIDNEY STONE REMOVAL X 4     HC UGI ENDOSCOPY W EUS N/A 12/29/2016    Procedure: COMBINED ENDOSCOPIC ULTRASOUND, ESOPHAGOSCOPY, GASTROSCOPY, DUODENOSCOPY (EGD);  Surgeon: Nasim Louis MD;  Location:  GI     HERNIA REPAIR       INCISION AND DRAINAGE FOOT, COMBINED Left 6/6/2017    Procedure: COMBINED INCISION AND DRAINAGE FOOT;  REVISIONAL LEFT FOOT INCISION AND DRAINAGE WITH POSSIBLE FLAP CLOSURE , ANTIBIOTIC SOLUTION ;  Surgeon: Nabil Ann DPM;  Location:  OR     LASER HOLMIUM LITHOTRIPSY URETER(S), INSERT STENT, COMBINED  3/2/2012    Procedure:COMBINED CYSTOSCOPY, URETEROSCOPY, LASER HOLMIUM LITHOTRIPSY URETER(S), INSERT STENT; CYSTOSCOPY, RIGHT RETROGRADES, RIGHT URETEROSCOPY, HOLMIUM LASER, RIGHT STENT PLACEMENT; Surgeon:ANJELICA LEÓN; Location: OR     ROTATOR CUFF REPAIR RT/LT       Social History   Substance Use Topics     Smoking status: Former Smoker     Packs/day: 1.00     Years: 30.00     Types: Cigarettes     Quit date: 1/1/1999     Smokeless tobacco: Never Used     Alcohol use 4.2 oz/week     7 Standard drinks or equivalent per week      Comment: 1 drink per week     Current Outpatient Prescriptions   Medication Sig Dispense Refill     metoprolol (TOPROL-XL) 25 MG 24 hr tablet Take 0.5 tablets (12.5 mg) by mouth daily 45 tablet 0     glipiZIDE (GLUCOTROL) 10 MG tablet Take 1 tablet (10 mg) by mouth 2 times daily (before meals) 180 tablet 3     NONFORMULARY 1 packet 2 times daily L-Emental:Arginade Supplement  BID for wound  "healing       HYDROcodone-acetaminophen (NORCO) 5-325 MG per tablet Take 1 tablet by mouth every 4 hours as needed for moderate to severe pain Give 1 tablet by mouth every 4 hours as needed for Pain 1-5/10 AND Give 2 tablet by mouth every 4 hours as needed for Pain 6-10/10       AMITRIPTYLINE HCL PO Take 25 mg by mouth nightly as needed for sleep       ipratropium (ATROVENT) 0.03 % spray Spray 2 sprays into both nostrils every 12 hours       DULoxetine (CYMBALTA) 30 MG EC capsule Take 1 capsule (30 mg) by mouth daily 90 capsule 3     omeprazole (PRILOSEC) 40 MG capsule Take 1 capsule (40 mg) by mouth daily Take 30-60 minutes before a meal. 90 capsule 3     tamsulosin (FLOMAX) 0.4 MG capsule Take 1 capsule (0.4 mg) by mouth daily 90 capsule 3     aspirin EC 81 MG EC tablet Take 1 tablet (81 mg) by mouth daily 30 tablet 0     METFORMIN HCL PO Take 1,000 mg by mouth 2 times daily (with meals)       ATORVASTATIN CALCIUM PO Take 80 mg by mouth daily       blood glucose monitoring (NO BRAND SPECIFIED) test strip Use to test blood sugar three times daily or as directed. 300 each 3     order for DME Equipment being ordered: True Matrix Blood Glucose meter. 1 Device 0     Psyllium (METAMUCIL PO) Take 1 packet by mouth daily as needed        Clopidogrel Bisulfate, [id number], (PLAVIX PO) Take 75 mg by mouth daily        Allergies   Allergen Reactions     Oxycodone Other (See Comments)     \"TERRIBLE SWEATING\"     Sulfa Drugs      Tetracycline        Reviewed and updated as needed this visit by clinical staff and provider      ROS  Detailed as above       /54 (BP Location: Left arm, Cuff Size: Adult Regular)  Pulse 56  Temp 97  F (36.1  C) (Oral)  Ht 5' 5\" (1.651 m)  Wt 166 lb 14.4 oz (75.7 kg)  SpO2 97%  BMI 27.77 kg/m2    Physical Exam   Constitutional: He is well-developed, well-nourished, and in no distress.   HENT:   Head: Normocephalic and atraumatic.   Eyes: Conjunctivae and EOM are normal.   Neck: Normal " "range of motion.   Cardiovascular: Normal rate, regular rhythm and normal heart sounds.    Pulmonary/Chest: Effort normal.   Musculoskeletal: Normal range of motion.   Neurological: He is alert.   Skin: Skin is warm and dry.   Psychiatric: Mood and affect normal.   Vitals reviewed.        Assessment and Plan:       ICD-10-CM    1. Dizzy spells R42 Hemoglobin     Zio Patch Holter   2. Cardiomyopathy, unspecified type (H) I42.9 metoprolol (TOPROL-XL) 25 MG 24 hr tablet       Chronic dizzy spells that are worsening where he is not falling and \"blacking out\"   He did not get orthostatic hypotension today on exam. His heart rate did drop just slightly when standing and was without symptoms   I am curious about his HR playing a role in his symptoms   We will drop his metoprolol from 25 to 12.5mg.   hgb today is improved   Recommended that he get in to see his cardiologist as soon as possible   We will also complete a holter monitor. His significant other is not interested in doing this as she did not have any findings when she did it. I discussed the possible outcomes with them and strongly urged that they complete this.   They will also f/u with PCP.       Patient Instructions   Cut the metoprolol in half and take that daily. Your new dose is 12.5mg.   See if that helps your symptoms. You can discuss the dosing with your cardiologist. Please call them as soon as possible to get an appointment     Call cardiology if you want to do the holter monitor   [facility] Cardiology: [phone]            ASPEN Baptiste, CNP  [facility]    "

## 2017-09-28 NOTE — MR AVS SNAPSHOT
After Visit Summary   9/28/2017    Dustin Pearce    MRN: [id number]           Patient Information     Date Of Birth          1/31/1928        Visit Information        Provider Department      9/28/2017 6:50 PM Lesa Marin PT [place] [facility]        Today's Diagnoses     Acute pain of left shoulder        Generalized muscle weakness        Balance problems           Follow-ups after your visit        Your next 10 appointments already scheduled     Oct 12, 2017  1:50 PM CDT   US LOWER EXTREMITY ARTERIAL DUPLEX LEFT with SHUS5   [facility] ([facility])    [address] [id number] [phone]           Please bring a list of your medicines (including vitamins, minerals and over-the-counter drugs). Also, tell your doctor about any allergies you may have. Wear comfortable clothes and leave your valuables at home.  You do not need to do anything special to prepare for your exam.  Please call the Imaging Department at your exam site with any questions.            Oct 12, 2017  2:30 PM CDT   Return Visit with Roland Rodriguez MD   [facility] ([facility])    [address]  [facility] [address] [phone]            Oct 26, 2017  9:30 AM CDT   Return Visit with Nabil Ann DPM   [facility]    [address] [id number] [phone]              Future tests that were ordered for you today     Open Future Orders        Priority Expected Expires Ordered    Zio Patch Holter Routine  11/12/2017 9/28/2017            Who to contact     If you have questions or need follow up information about today's clinic visit or your schedule please contact [facility] directly at [phone].  Normal or non-critical  lab and imaging results will be communicated to you by MyChart, letter or phone within 4 business days after the clinic has received the results. If you do not hear from us within 7 days, please contact the clinic through MyChart or phone. If you have a critical or abnormal lab result, we will notify you by phone as soon as possible.  Submit refill requests through [employer] or call your pharmacy and they will forward the refill request to us. Please allow 3 business days for your refill to be completed.          Additional Information About Your Visit        Care EveryWhere ID     This is your Care EveryWhere ID. This could be used by other organizations to access your [facility] medical records  [id number]         Blood Pressure from Last 3 Encounters:   09/28/17 116/54   07/27/17 123/58   07/24/17 128/51    Weight from Last 3 Encounters:   09/28/17 75.7 kg (166 lb 14.4 oz)   07/27/17 73.9 kg (163 lb)   07/24/17 74 kg (163 lb 3.2 oz)              We Performed the Following     CAM Progress Notes Report     Neuromuscular Re-Education     Therapeutic Exercises          Today's Medication Changes          These changes are accurate as of: 9/28/17  8:10 PM.  If you have any questions, ask your nurse or doctor.               These medicines have changed or have updated prescriptions.        Dose/Directions    metoprolol 25 MG 24 hr tablet   Commonly known as:  TOPROL-XL   Indication:  High Blood Pressure Disorder   This may have changed:  how much to take   Used for:  Cardiomyopathy, unspecified type (H)   Changed by:  Makenna Esparza APRN CNP        Dose:  12.5 mg   Take 0.5 tablets (12.5 mg) by mouth daily   Quantity:  45 tablet   Refills:  0            Where to get your medicines      These medications were sent to [facility] - [place], [place] - [address] AT [facility] [address], [facility][1] [phone]    Hours:  24-hours Phone:  [phone]     metoprolol 25 MG 24  hr tablet                Primary Care Provider Office Phone # Fax #    Matt MELVIN Morrissey [phone]       [address] [place] [address] [address]        Equal Access to Services     LAURENCE JACOBSON : Hadii aad ku hadasho Soomaali, waaxda luqadaha, qaybta kaalmada adeegyada, waxay idiin shyn adehelena abreu laAnaytina miranda. So [facility] [phone].    ATENCIÓN: Si habla español, tiene a garvey disposición servicios gratuitos de asistencia lingüística. Llame al [phone].    We comply with applicable federal civil rights laws and [place] laws. We do not discriminate on the basis of race, color, national origin, age, disability sex, sexual orientation or gender identity.            Thank you!     Thank you for choosing [facility] PH[facility]ERAP[facility]  for your care. Our goal is always to provide you with excellent care. Hearing back from our patients is one way we can continue to improve our services. Please take a few minutes to complete the written survey that you may receive in the mail after your visit with us. Thank you!             Your Updated Medication List - Protect others around you: Learn how to safely use, store and throw away your medicines at www.disposemymeds.org.          This list is accurate as of: 9/28/17  8:10 PM.  Always use your most recent med list.                   Brand Name Dispense Instructions for use Diagnosis    AMITRIPTYLINE HCL PO      Take 25 mg by mouth nightly as needed for sleep        aspirin 81 MG EC tablet     30 tablet    Take 1 tablet (81 mg) by mouth daily    Transient cerebral ischemia, unspecified type       ATORVASTATIN CALCIUM PO      Take 80 mg by mouth daily        blood glucose monitoring test strip    no brand specified    300 each    Use to test blood sugar three times daily or as directed.    Hypoglycemia       DULoxetine 30 MG EC capsule    CYMBALTA    90 capsule    Take 1 capsule (30 mg) by mouth daily     Polyneuropathy associated with underlying disease (H)       glipiZIDE 10 MG tablet    GLUCOTROL    180 tablet    Take 1 tablet (10 mg) by mouth 2 times daily (before meals)    Type 2 diabetes mellitus with diabetic peripheral angiopathy without gangrene, without long-term current use of insulin (H)       HYDROcodone-acetaminophen 5-325 MG per tablet    NORCO     Take 1 tablet by mouth every 4 hours as needed for moderate to severe pain Give 1 tablet by mouth every 4 hours as needed for Pain 1-5/10 AND Give 2 tablet by mouth every 4 hours as needed for Pain 6-10/10        ipratropium 0.03 % spray    ATROVENT     Spray 2 sprays into both nostrils every 12 hours        METAMUCIL PO      Take 1 packet by mouth daily as needed        METFORMIN HCL PO      Take 1,000 mg by mouth 2 times daily (with meals)        metoprolol 25 MG 24 hr tablet    TOPROL-XL    45 tablet    Take 0.5 tablets (12.5 mg) by mouth daily    Cardiomyopathy, unspecified type (H)       NONFORMULARY      1 packet 2 times daily L-Emental:Arginade Supplement BID for wound healing        omeprazole 40 MG capsule    priLOSEC    90 capsule    Take 1 capsule (40 mg) by mouth daily Take 30-60 minutes before a meal.    Other acute gastritis without hemorrhage       order for DME     1 Device    Equipment being ordered: True Matrix Blood Glucose meter.    Type 2 diabetes mellitus without complication (H)       PLAVIX PO      Take 75 mg by mouth daily    Cough       tamsulosin 0.4 MG capsule    FLOMAX    90 capsule    Take 1 capsule (0.4 mg) by mouth daily    Benign non-nodular prostatic hyperplasia with lower urinary tract symptoms

## 2017-09-28 NOTE — TELEPHONE ENCOUNTER
Reason for call:  Patient reporting a symptom    Symptom or request: backing out dizzines    Duration (how long have symptoms been present): sudden    Have you been treated for this before? Yes    Additional comments: pt wife stating that pt is passing ,dizziness and wants to bring pt in now trans to [facility]     Phone Number patient can be reached at:  Home number on file [phone] (home)    Best Time:  any    Can we leave a detailed message on this number:  YES    Call taken on 9/28/2017 at 11:43 AM by Isael Mac

## 2017-09-28 NOTE — MR AVS SNAPSHOT
After Visit Summary   9/28/2017    Dustin Pearce    MRN: [id number]           Patient Information     Date Of Birth          1/31/1928        Visit Information        Provider Department      9/28/2017 4:00 PM Makenna Esparza APRN CNP [facility]        Today's Diagnoses     Dizzy spells    -  1    Cardiomyopathy, unspecified type (H)          Care Instructions    Cut the metoprolol in half and take that daily. Your new dose is 12.5mg.   See if that helps your symptoms. You can discuss the dosing with your cardiologist. Please call them as soon as possible to get an appointment     Call cardiology if you want to do the holter monitor   [facility] Cardiology: [phone]              Follow-ups after your visit        Your next 10 appointments already scheduled     Sep 28, 2017  6:50 PM CDT   CAM Extremity with Lesa Marin, PT   [place] [facility] ([facility]  )    [address] [place] [id number] [phone]            Oct 12, 2017  1:50 PM CDT   US LOWER EXTREMITY ARTERIAL DUPLEX LEFT with SHUS5   [facility] Ultrasound ([facility])    [address] [facility] [id number] [phone]           Please bring a list of your medicines (including vitamins, minerals and over-the-counter drugs). Also, tell your doctor about any allergies you may have. Wear comfortable clothes and leave your valuables at home.  You do not need to do anything special to prepare for your exam.  Please call the Imaging Department at your exam site with any questions.            Oct 12, 2017  2:30 PM CDT   Return Visit with Roland Rodriguez MD   [facility] ([facility])    [address]  [facility] [id number] [phone]            Oct 26, 2017  9:30 AM CDT   Return Visit with Nabil Ann DPM   [facility]  "[place] ([facility])    [address] [facility] [id number] [phone]              Future tests that were ordered for you today     Open Future Orders        Priority Expected Expires Ordered    Zio Patch Holter Routine  11/12/2017 9/28/2017            Who to contact     If you have questions or need follow up information about today's clinic visit or your schedule please contact [facility] directly at [phone].  Normal or non-critical lab and imaging results will be communicated to you by MyChart, letter or phone within 4 business days after the clinic has received the results. If you do not hear from us within 7 days, please contact the clinic through MyChart or phone. If you have a critical or abnormal lab result, we will notify you by phone as soon as possible.  Submit refill requests through [employer] or call your pharmacy and they will forward the refill request to us. Please allow 3 business days for your refill to be completed.          Additional Information About Your Visit        Care EveryWhere ID     This is your Care EveryWhere ID. This could be used by other organizations to access your [facility] medical records  [id number]        Your Vitals Were     Pulse Temperature Height Pulse Oximetry BMI (Body Mass Index)       56 97  F (36.1  C) (Oral) 5' 5\" (1.651 m) 97% 27.77 kg/m2        Blood Pressure from Last 3 Encounters:   09/28/17 116/54   07/27/17 123/58   07/24/17 128/51    Weight from Last 3 Encounters:   09/28/17 166 lb 14.4 oz (75.7 kg)   07/27/17 163 lb (73.9 kg)   07/24/17 163 lb 3.2 oz (74 kg)              We Performed the Following     Hemoglobin          Today's Medication Changes          These changes are accurate as of: 9/28/17  4:43 PM.  If you have any questions, ask your nurse or doctor.               These medicines have changed or have updated prescriptions.        Dose/Directions    metoprolol 25 MG 24 hr tablet   Commonly known as:  TOPROL-XL "   Indication:  High Blood Pressure Disorder   This may have changed:  how much to take   Used for:  Cardiomyopathy, unspecified type (H)   Changed by:  Makenna Esparza APRN CNP        Dose:  12.5 mg   Take 0.5 tablets (12.5 mg) by mouth daily   Quantity:  45 tablet   Refills:  0            Where to get your medicines      These medications were sent to [facility] - [place], [place] - [address] AT [facility] [address]  [address], [facility] [address]    Hours:  24-hours Phone:  [phone]     metoprolol 25 MG 24 hr tablet                Primary Care Provider Office Phone # Fax #    Matt Morrissey [phone]       [facility] [address] [address]        Equal Access to Services     [facility] ServicesABBEY AH: Hadii aad ku hadasho Soomaali, waaxda luqadaha, qaybta kaalmada adeegyada, waxay idiin hayaan adehelena gama . So [facility] [phone].    ATENCIÓN: Si habla español, tiene a garvey disposición servicios gratuitos de asistencia lingüística. Idaame al [phone].    We comply with applicable federal civil rights laws and [place] laws. We do not discriminate on the basis of race, color, national origin, age, disability sex, sexual orientation or gender identity.            Thank you!     Thank you for choosing [facility]  for your care. Our goal is always to provide you with excellent care. Hearing back from our patients is one way we can continue to improve our services. Please take a few minutes to complete the written survey that you may receive in the mail after your visit with us. Thank you!             Your Updated Medication List - Protect others around you: Learn how to safely use, store and throw away your medicines at www.disposemymeds.org.          This list is accurate as of: 9/28/17  4:43 PM.  Always use your most recent med list.                   Brand Name Dispense Instructions for use Diagnosis     AMITRIPTYLINE HCL PO      Take 25 mg by mouth nightly as needed for sleep        aspirin 81 MG EC tablet     30 tablet    Take 1 tablet (81 mg) by mouth daily    Transient cerebral ischemia, unspecified type       ATORVASTATIN CALCIUM PO      Take 80 mg by mouth daily        blood glucose monitoring test strip    no brand specified    300 each    Use to test blood sugar three times daily or as directed.    Hypoglycemia       DULoxetine 30 MG EC capsule    CYMBALTA    90 capsule    Take 1 capsule (30 mg) by mouth daily    Polyneuropathy associated with underlying disease (H)       glipiZIDE 10 MG tablet    GLUCOTROL    180 tablet    Take 1 tablet (10 mg) by mouth 2 times daily (before meals)    Type 2 diabetes mellitus with diabetic peripheral angiopathy without gangrene, without long-term current use of insulin (H)       HYDROcodone-acetaminophen 5-325 MG per tablet    NORCO     Take 1 tablet by mouth every 4 hours as needed for moderate to severe pain Give 1 tablet by mouth every 4 hours as needed for Pain 1-5/10 AND Give 2 tablet by mouth every 4 hours as needed for Pain 6-10/10        ipratropium 0.03 % spray    ATROVENT     Spray 2 sprays into both nostrils every 12 hours        METAMUCIL PO      Take 1 packet by mouth daily as needed        METFORMIN HCL PO      Take 1,000 mg by mouth 2 times daily (with meals)        metoprolol 25 MG 24 hr tablet    TOPROL-XL    45 tablet    Take 0.5 tablets (12.5 mg) by mouth daily    Cardiomyopathy, unspecified type (H)       NONFORMULARY      1 packet 2 times daily L-Emental:Arginade Supplement BID for wound healing        omeprazole 40 MG capsule    priLOSEC    90 capsule    Take 1 capsule (40 mg) by mouth daily Take 30-60 minutes before a meal.    Other acute gastritis without hemorrhage       order for DME     1 Device    Equipment being ordered: True Matrix Blood Glucose meter.    Type 2 diabetes mellitus without complication (H)       PLAVIX PO      Take 75 mg by mouth  daily    Cough       tamsulosin 0.4 MG capsule    FLOMAX    90 capsule    Take 1 capsule (0.4 mg) by mouth daily    Benign non-nodular prostatic hyperplasia with lower urinary tract symptoms

## 2017-09-28 NOTE — LETTER
[place] [address] [facility]  [address]  [place]  [address]  Tel: [phone]    September 29, 2017    Dustin Pearce  [address] [facility] [address]        Dear Mr. Pearce,    Your hemoglobin went up nicely to a nearly normal number. Great news.   Be in touch if you need anything     If you have any further questions or problems, please contact our office.      Sincerely,    Makenna Esparza NP/SANGEETHA          Enclosure: Lab Results  Results for orders placed or performed in visit on 09/28/17   Hemoglobin   Result Value Ref Range    Hemoglobin 12.7 (L) 13.3 - 17.7 g/dL

## 2017-09-28 NOTE — PATIENT INSTRUCTIONS
Cut the metoprolol in half and take that daily. Your new dose is 12.5mg.   See if that helps your symptoms. You can discuss the dosing with your cardiologist. Please call them as soon as possible to get an appointment     Call cardiology if you want to do the holter monitor   [facility] Cardiology: [phone]

## 2017-09-28 NOTE — NURSING NOTE
"Chief Complaint   Patient presents with     Dizziness     Pt has been blacking out with dizzy spells. Saturday, he blacked out and fell. Dizzy spells every time he stands up.       Initial /54 (BP Location: Left arm, Cuff Size: Adult Regular)  Pulse 56  Temp 97  F (36.1  C) (Oral)  Ht 5' 5\" (1.651 m)  Wt 166 lb 14.4 oz (75.7 kg)  SpO2 97%  BMI 27.77 kg/m2 Estimated body mass index is 27.77 kg/(m^2) as calculated from the following:    Height as of this encounter: 5' 5\" (1.651 m).    Weight as of this encounter: 166 lb 14.4 oz (75.7 kg).  Medication Reconciliation: complete.    Yolanda Escamilla CMA      "

## 2017-09-28 NOTE — TELEPHONE ENCOUNTER
Spoke with Halina (wife of pt)   Pt has been blacking out with dizzy spells   More prevalent recently - last time he blacked out and fell was Saturday, dizzy spells every time he stands up  Fell on Saturday night - did not hit head. Was fine after that  Fell from this also back in June   Has not been seen for this yet   Never dizzy with sitting, once he is up then he is ok, no dizziness with moving in bed   No BP cuff at home 118/59 pulse 59 today at clinic - at foot clinic (was not dizzy at that time and did not have standing BP checked)  Denies: Numbness/weakness, Confusion, Facial drooping, vision changes, N/V, ear ringing, SOB, chest pain  Takes seconds to regain consciousness, does not seem confused when blacks out  Staying well hydrated   Does not notice heart palpations or racing heart    Does have DM - 167 blood sugar this morning (usual is 149-170)  Currently feeling okay as he is sitting down     Advised OV today with TEAM. Scheduled for 4pm today. Discussed red flag symptoms requiring ED visit ([facility] triage pg 192).   Elda FERNÁNDEZ RN

## 2017-09-28 NOTE — PROGRESS NOTES
Dustin  Your hemoglobin went up nicely to a nearly normal number. Great news.   Be in touch if you need anything  Makenna Esparza NP

## 2017-09-29 NOTE — PROGRESS NOTES
"Subjective:    HPI                    Objective:    System    Physical Exam    General     ROS    Assessment/Plan:      DISCHARGE REPORT    Progress reporting period is from 08/07/2017 to 09/28/2017.       SUBJECTIVE  Subjective: Patient reports falling on 09/23/2017--was having episodes of dizziness earlier that evening when out for dinner and fell at home when he went to get his pajamas on.  Saw MD today regarding the dizziness who thinks it's blood pressure hemoglobin related.  Patient reports his L shoulder has been more sore since falling.  He had been doing his exercises \"every once in awhile\" until falling and has not done this week now.  Reaching is still mildly painful most of the time and sometimes more painful than others.      Current Pain level: 0/10.     Initial Pain level: 4/10.   Changes in function:  Yes, less pain with reaching than at initial visit.  Adverse reaction to treatment or activity: None    OBJECTIVE  Objective: L shoulder AROM:  flexion and abduction equal to R, mild L shoulder pain with both.  Strength:  flexion 5/5, mild pain; abduction 5/5, mild pain; IR 5/5, no pain; ER 3+/5, pain.  Balance:  SLS, floor, EO 3\" B.       ASSESSMENT/PLAN  Patient has been seen for 8 visits with treatment focusing on scapular and rotator cuff strengthening exercises to address the L shoulder pain in addition to balance and LE strengthening exercises to address his falls risk and instability with gait/mobility.  L shoulder has made some progress throughout treatment.  Strength has improved, and he reports less pain with functional activities such as reaching.  Balance and LE strength have not changed and remain variable making progression difficult.  He has issues with dizziness and is being followed by his MD for this.  Dizziness results in instability and has caused his falls.  He has a good HEP and needs to continue with this consistently at home to see further gains.   He will continue with this " independently at this point.    Updated problem list and treatment plan: Diagnosis 1:  L shoulder pain  Pain -  self management, education and home program  Decreased ROM/flexibility - home program  Decreased strength - home program  Decreased function - home program  Impaired posture - home program  Diagnosis 2:  Balance deficits and LE weakness, falls   Decreased strength - home program  Impaired balance - home program  Decreased function - home program  STG/LTGs have been met or progress has been made towards goals:  Yes, STG for pain with reaching has been met, but LTG is unmet.  Initial goals for balance have been met, but newly established goals are unmet.    Assessment of Progress: The patient's condition has potential to improve.  The patient's progress has plateaued.  Self Management Plans:  Patient has been instructed in a home treatment program.  Patient is independent in a home treatment program.  Patient  has been instructed in self management of symptoms.  Patient is independent in self management of symptoms.  Dustin continues to require the following intervention to meet STG and LTG's:  PT intervention is no longer required to meet STG/LTG.    Recommendations:  This patient is ready to be discharged from therapy and continue their home treatment program.    Please refer to the daily flowsheet for treatment today, total treatment time and time spent performing 1:1 timed codes.

## 2017-10-02 ENCOUNTER — CARE COORDINATION (OUTPATIENT)
Dept: CARE COORDINATION | Facility: CLINIC | Age: 82
End: 2017-10-02

## 2017-10-02 NOTE — PROGRESS NOTES
Clinic Care Coordination Contact    Patient seen in clinic on 09/28/17 for syncopal episodes.  Holter monitor was ordered. Metoprolol was reduced from 25 mg daily to 12.5 mg daily.  Noted low BP while in TCU in July.    Needs appt for Holter (contact Cardiology)     Spoke with Halina Alejo (C2C on file in [facility]).  Halina works full time, has had to take PTO to bring patient to appts. Patient's children live out of town.   Has talked about metro mobility, patient and his friend Halina are not at all comfortable with that. Halina states patient requires emotional support in clinic, and SBA to ambulate.    Has a walker he uses in community only. Using cane in the house.    Patient gets out during the day with a friend in his apartment.   Today /70 at [employer].     Plan:   Patient to schedule Holter monitor, likely for 10/12 as they would like to limit appt days.   Patient to continue to monitor symptoms and BP.     Complex Care Plan mailed to patient.   RN CCC to f/u in 2-3 weeks.     FERNANDO ChavezN, RN, PHN  Clinic Care Coordinator  [facility]  [phone]

## 2017-10-02 NOTE — LETTER
[facility]  Complex Care Plan  About Me  Patient Name:  Dustin Pearce    YOB: 1928  Age:   89 year old   [place] MRN: [id number] Telephone Information:     Home Phone [phone]   Mobile Not on file.       Address:    [address]  [facility] [address] Email address:  [email or web address]      Emergency Contact(s)  Name Relationship Lgl Grd Work Phone Home Phone Mobile Phone   1. DOLORES SMYTH Friend  [phone]    2. NSC Other   [phone]            Primary language:  English     needed? No   [facility] [facility]:  [phone] op. 1  Other communication barriers: No  Preferred Method of Communication:  Letter, Phone  Current living arrangement: I live in a private home  Mobility Status/ Medical Equipment: Independent w/Device  Other information to know about me:    Health Maintenance  Health Maintenance Reviewed: Up to date    My Access Plan  Medical Emergency 911   Primary Clinic Line [facility]- [phone]   24 Hour Appointment Line [phone] or  [phone]) (toll-free)   24 Hour Nurse Line [phone] (toll-free)   Preferred Urgent Care  (n/a)   Preferred Hospital [facility]  [phone]   Preferred Pharmacy [facility] [address] AT [facility] [address]     Behavioral Health Crisis Line The National Suicide Prevention Lifeline at 1-995.540.4118 or 911     My Care Team Members  Patient Care Team       Relationship Specialty Notifications Start End    Matt Morrissey MD PCP - General Internal Medicine  12/18/16     Phone: [phone] Pager: [phone] Fax: [phone]        [facility] [address] [address]    Matt Morrissey MD PCP - Internal Medicine Internal Medicine  11/4/16     Phone: [phone] Pager: [phone] Fax: [phone]        [facility] [address]  [place] [address]    Elvira Guerra, RN Clinic Care Coordinator Internal Medicine Admissions 6/19/17     Comment:  PH: [phone]         My Care Plans  Self Management and Treatment Plan  Goals and (Comments)  Goal #1: I will check my blood pressure 3 times per week      10% of goal reached    Goal #2: I will make an appt to obtain my Holter Monitor      0% of goal reached      Action Plans on File:  (NA)  Advance Care Plans/Directives Type:   Type Advanced Care Plans/Directives:  (NA)    My Medical and Care Information  Problem List   Patient Active Problem List   Diagnosis     Coronary artery disease involving native coronary artery of native heart without angina pectoris     Mild cognitive impairment     Hyperlipidemia LDL goal <70     Benign non-nodular prostatic hyperplasia with lower urinary tract symptoms     Gastroesophageal reflux disease without esophagitis     Cerebrovascular accident (H)     Carotid artery stenosis     Abdominal aortic aneurysm (H)     Lumbar back pain     Benign essential hypertension     TIA (transient ischemic attack)     Paroxysmal atrial fibrillation (H)     Cardiomyopathy (H)     Aortic root dilatation (H)     Physical deconditioning     Diabetic ulcer of left foot associated with diabetes mellitus due to underlying condition (H)     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)     Anemia due to blood loss, acute     Diarrhea, unspecified type     Nausea     PVD (peripheral vascular disease) (H)     Acute pain of left shoulder     Balance problems     Generalized muscle weakness      Current Medications and Allergies:  See printed Medication Report.    Care Coordination Start Date: 10/02/17   Frequency of Care Coordination:  (Every 2-4 weeks)   Form Last Updated: 10/02/2017

## 2017-10-03 VITALS
DIASTOLIC BLOOD PRESSURE: 58 MMHG | TEMPERATURE: 97 F | WEIGHT: 166.9 LBS | SYSTOLIC BLOOD PRESSURE: 118 MMHG | OXYGEN SATURATION: 97 % | HEIGHT: 65 IN | HEART RATE: 52 BPM | BODY MASS INDEX: 27.81 KG/M2

## 2017-10-12 ENCOUNTER — HOSPITAL ENCOUNTER (OUTPATIENT)
Dept: ULTRASOUND IMAGING | Facility: CLINIC | Age: 82
Discharge: HOME | End: 2017-10-12
Attending: SURGERY | Admitting: SURGERY
Payer: MEDICARE

## 2017-10-12 ENCOUNTER — OFFICE VISIT (OUTPATIENT)
Dept: OTHER | Facility: CLINIC | Age: 82
End: 2017-10-12
Attending: SURGERY
Payer: MEDICARE

## 2017-10-12 VITALS — DIASTOLIC BLOOD PRESSURE: 61 MMHG | HEART RATE: 72 BPM | SYSTOLIC BLOOD PRESSURE: 136 MMHG

## 2017-10-12 DIAGNOSIS — I71.40 ABDOMINAL AORTIC ANEURYSM (AAA) WITHOUT RUPTURE (H): Chronic | ICD-10-CM

## 2017-10-12 DIAGNOSIS — I70.209: ICD-10-CM

## 2017-10-12 DIAGNOSIS — I73.9 PVD (PERIPHERAL VASCULAR DISEASE) (H): Primary | ICD-10-CM

## 2017-10-12 DIAGNOSIS — I65.22 LEFT CAROTID ARTERY OCCLUSION: ICD-10-CM

## 2017-10-12 DIAGNOSIS — I77.1 ILIAC ARTERY STENOSIS, LEFT (H): ICD-10-CM

## 2017-10-12 DIAGNOSIS — I65.21 CAROTID STENOSIS, ASYMPTOMATIC, RIGHT: ICD-10-CM

## 2017-10-12 PROCEDURE — 99214 OFFICE O/P EST MOD 30 MIN: CPT | Mod: ZP | Performed by: PHYSICIAN ASSISTANT

## 2017-10-12 PROCEDURE — 93926 LOWER EXTREMITY STUDY: CPT | Mod: LT

## 2017-10-12 PROCEDURE — 99211 OFF/OP EST MAY X REQ PHY/QHP: CPT

## 2017-10-12 NOTE — NURSING NOTE
"Chief Complaint   Patient presents with     RECHECK     MARK CALABRESE (1:45 VHC; 2:30 WRO) History of MEG stent SFA and PTA stenosis; hospital follow up 6/2/17 3 -4 months appointment with Jennifer        Initial /61 (BP Location: Left arm, Patient Position: Chair, Cuff Size: Adult Regular)  Pulse 72 Estimated body mass index is 27.77 kg/(m^2) as calculated from the following:    Height as of 9/28/17: 5' 5\" (1.651 m).    Weight as of 9/28/17: 166 lb 14.4 oz (75.7 kg).  Medication Reconciliation: complete     Face to face nursing time: 8 minutes    Deidra Maria MA     "

## 2017-10-12 NOTE — MR AVS SNAPSHOT
After Visit Summary   10/12/2017    Dustin Pearce    MRN: [id number]           Patient Information     Date Of Birth          1/31/1928        Visit Information        Provider Department      10/12/2017 2:30 PM Subhash Monsalve PA-C [facility]      Today's Diagnoses     PVD (peripheral vascular disease) (H)    -  1    Abdominal aortic aneurysm (AAA) without rupture (H)        Left carotid artery occlusion        Carotid stenosis, asymptomatic, right           Follow-ups after your visit        Your next 10 appointments already scheduled     Oct 26, 2017  8:00 AM CDT   Return Visit with Chrissy Padgett PA-C   [facility] AT [place] ([facility])    [address]  [facility] [address]   [phone]            Oct 26, 2017  9:30 AM CDT   [facility] Visit with Nabil Ann DPM   [facility]    [address] [address]   [phone]            Dec 14, 2017  9:45 AM CST   Return Visit with Rafa Samuel MD   [facility] AT [place] ([facility])    [id number]  [facility] [address]   [phone]              Future tests that were ordered for you today     Open Future Orders        Priority Expected Expires Ordered    US Carotid Right Routine 6/1/2018 10/25/2018 10/25/2017    US Lower Extremity Arterial Duplex Left Routine 6/1/2018 10/12/2018 10/25/2017            Who to contact     If you have questions or need follow up information about today's clinic visit or your schedule please contact [facility] directly at [phone].  Normal or non-critical lab and imaging results will be communicated to you by MyChart, letter or phone within 4 business days after the clinic has received the results. If you do not hear from us within 7 days,  please contact the clinic through GoodAprilt or phone. If you have a critical or abnormal lab result, we will notify you by phone as soon as possible.  Submit refill requests through SpareFoot or call your pharmacy and they will forward the refill request to us. Please allow 3 business days for your refill to be completed.          Additional Information About Your Visit        Care EveryWhere ID     This is your Care EveryWhere ID. This could be used by other organizations to access your [facility] medical records  [id number]        Your Vitals Were     Pulse                   72            Blood Pressure from Last 3 Encounters:   10/12/17 136/61   10/03/17 118/58   07/27/17 123/58    Weight from Last 3 Encounters:   09/28/17 75.7 kg (166 lb 14.4 oz)   07/27/17 73.9 kg (163 lb)   07/24/17 74 kg (163 lb 3.2 oz)              Today, you had the following     No orders found for display       Primary Care Provider Office Phone # Fax #    Matt Morrissey [phone]       [facility] [address] [place] [address] [facility] [address]  [facility] [address]        Equal Access to Services     SIERRA JACOBSON : Hadii aad ku hadasho Soomaali, waaxda luqadaha, qaybta kaalmada adeegyada, maurice gama . So [facility] [place] [phone].    ATENCIÓN: Si habla español, tiene a garvey disposición servicios gratuitos de asistencia lingüística. [facility] [phone].    We comply with applicable federal civil rights laws and [place] laws. We do not discriminate on the basis of race, color, national origin, age, disability, sex, sexual orientation, or gender identity.            Thank you!     Thank you for choosing [facility] VASCULAR [place]  for your care. Our goal is always to provide you with excellent care. Hearing back from our patients is one way we can continue to improve our services. Please take a few minutes to complete the written survey that you may receive in the mail after your visit with us. Thank you!              Your Updated Medication List - Protect others around you: Learn how to safely use, store and throw away your medicines at www.disposemymeds.org.          This list is accurate as of: 10/12/17 11:59 PM.  Always use your most recent med list.                   Brand Name Dispense Instructions for use Diagnosis    AMITRIPTYLINE HCL PO      Take 25 mg by mouth nightly as needed for sleep        aspirin 81 MG EC tablet     30 tablet    Take 1 tablet (81 mg) by mouth daily    Transient cerebral ischemia, unspecified type       ATORVASTATIN CALCIUM PO      Take 80 mg by mouth daily        blood glucose monitoring test strip    no brand specified    300 each    Use to test blood sugar three times daily or as directed.    Hypoglycemia       DULoxetine 30 MG EC capsule    CYMBALTA    90 capsule    Take 1 capsule (30 mg) by mouth daily    Polyneuropathy associated with underlying disease (H)       glipiZIDE 10 MG tablet    GLUCOTROL    180 tablet    Take 1 tablet (10 mg) by mouth 2 times daily (before meals)    Type 2 diabetes mellitus with diabetic peripheral angiopathy without gangrene, without long-term current use of insulin (H)       HYDROcodone-acetaminophen 5-325 MG per tablet    NORCO     Take 1 tablet by mouth every 4 hours as needed for moderate to severe pain Give 1 tablet by mouth every 4 hours as needed for Pain 1-5/10 AND Give 2 tablet by mouth every 4 hours as needed for Pain 6-10/10        ipratropium 0.03 % spray    ATROVENT     Spray 2 sprays into both nostrils every 12 hours        METAMUCIL PO      Take 1 packet by mouth daily as needed        METFORMIN HCL PO      Take 1,000 mg by mouth 2 times daily (with meals)        metoprolol 25 MG 24 hr tablet    TOPROL-XL    45 tablet    Take 0.5 tablets (12.5 mg) by mouth daily    Cardiomyopathy, unspecified type (H)       NONFORMULARY      1 packet 2 times daily L-Emental:Arginade Supplement BID for wound healing        omeprazole 40 MG capsule     priLOSEC    90 capsule    Take 1 capsule (40 mg) by mouth daily Take 30-60 minutes before a meal.    Other acute gastritis without hemorrhage       order for DME     1 Device    Equipment being ordered: True Matrix Blood Glucose meter.    Type 2 diabetes mellitus without complication (H)       PLAVIX PO      Take 75 mg by mouth daily    Cough       tamsulosin 0.4 MG capsule    FLOMAX    90 capsule    Take 1 capsule (0.4 mg) by mouth daily    Benign non-nodular prostatic hyperplasia with lower urinary tract symptoms

## 2017-10-13 NOTE — PROGRESS NOTES
[facility] Note     CC:  Routine vascular surveillance    Subjective:  Mr. Pearce returns for follow-up after common left iliac artery stent and superficial femoral artery angioplasty.  He has single-vessel runoff via the anterior tibial artery.  This occludes at the ankle however the dorsalis pedis and posterior tibial branches reconstitute in the foot.      His incisions have healed nicely and he has no concerns today.  Has a history of tobacco use but quit 17 years ago.    Objective:  Blood pressure 136/61, pulse 72.  Neck - right bruit on auscultation.  Chest - clear to auscultation, regular rate and rhythm.  Multiphasic Doppler flow at left anterior tibial artery and peroneal.  No appreciable Doppler flow at posterior tibialis.  Incisions are healed without drainage or infection.    Assessment:  S/p left iliac artery stenting and SFA angioplasty.  Peripheral arterial disease.   Left carotid artery occlusion  Right carotid artery stenosis - asymptomatic  Abdominal aortic aneurysm  Diabetes mellitus2  Hypertension  Hyperlipidemia    Plan:  Mr. Pearce continues to do well and has no current concerns.  He has adequate perfusion to his left leg and his wounds have healed.  He continues to remain asymptomatic without signs or symptoms of stroke TIA or amaurosis.  He has a known left carotid occlusion with moderate stenosis of his right carotid artery.  Additionally, he had a CT scan in December 2016 which documented a 3.2 cm abdominal aortic aneurysm.  He will continue aspirin and Plavix.  He'll return for routine surveillance in June 2018 for ankle-brachial index, U/S of Left leg stent and Right Carotid U/S, and an abdominal ultrasound to evaluate the aneurysm.      Subhash Monsalve PA-C    Please route or send letter to:  Primary Care Provider (PCP)

## 2017-10-25 DIAGNOSIS — I73.9 PAD (PERIPHERAL ARTERY DISEASE) (H): Primary | ICD-10-CM

## 2017-10-25 DIAGNOSIS — I65.29 CAROTID STENOSIS: Primary | ICD-10-CM

## 2017-10-25 DIAGNOSIS — I71.40 ABDOMINAL AORTIC ANEURYSM (AAA) WITHOUT RUPTURE (H): ICD-10-CM

## 2017-10-25 DIAGNOSIS — I73.9 PAD (PERIPHERAL ARTERY DISEASE) (H): ICD-10-CM

## 2017-10-26 ENCOUNTER — OFFICE VISIT (OUTPATIENT)
Dept: PODIATRY | Facility: CLINIC | Age: 82
End: 2017-10-26
Payer: MEDICARE

## 2017-10-26 ENCOUNTER — RADIANT APPOINTMENT (OUTPATIENT)
Dept: GENERAL RADIOLOGY | Facility: CLINIC | Age: 82
End: 2017-10-26
Attending: PODIATRIST
Payer: MEDICARE

## 2017-10-26 ENCOUNTER — OFFICE VISIT (OUTPATIENT)
Dept: CARDIOLOGY | Facility: CLINIC | Age: 82
End: 2017-10-26
Payer: MEDICARE

## 2017-10-26 VITALS
HEIGHT: 65 IN | BODY MASS INDEX: 27.99 KG/M2 | WEIGHT: 168 LBS | DIASTOLIC BLOOD PRESSURE: 60 MMHG | HEART RATE: 60 BPM | SYSTOLIC BLOOD PRESSURE: 110 MMHG

## 2017-10-26 VITALS
WEIGHT: 168 LBS | DIASTOLIC BLOOD PRESSURE: 54 MMHG | HEART RATE: 64 BPM | SYSTOLIC BLOOD PRESSURE: 129 MMHG | BODY MASS INDEX: 27.99 KG/M2 | HEIGHT: 65 IN

## 2017-10-26 DIAGNOSIS — I42.9 CARDIOMYOPATHY, UNSPECIFIED TYPE (H): ICD-10-CM

## 2017-10-26 DIAGNOSIS — Z98.890 S/P FOOT SURGERY, LEFT: ICD-10-CM

## 2017-10-26 DIAGNOSIS — Z98.890 S/P FOOT SURGERY, LEFT: Primary | ICD-10-CM

## 2017-10-26 DIAGNOSIS — R42 LIGHTHEADEDNESS: Primary | ICD-10-CM

## 2017-10-26 PROCEDURE — 99213 OFFICE O/P EST LOW 20 MIN: CPT | Performed by: PODIATRIST

## 2017-10-26 PROCEDURE — 99214 OFFICE O/P EST MOD 30 MIN: CPT | Performed by: PHYSICIAN ASSISTANT

## 2017-10-26 PROCEDURE — 73630 X-RAY EXAM OF FOOT: CPT | Mod: LT

## 2017-10-26 RX ORDER — METOPROLOL SUCCINATE 25 MG/1
12.5 TABLET, EXTENDED RELEASE ORAL DAILY
Status: ON HOLD
Start: 2017-10-26 | End: 2018-05-31

## 2017-10-26 RX ORDER — METOPROLOL SUCCINATE 25 MG/1
25 TABLET, EXTENDED RELEASE ORAL DAILY
COMMUNITY
Start: 2017-10-26 | End: 2017-10-26

## 2017-10-26 NOTE — PATIENT INSTRUCTIONS
DR. VELAZQUEZ'S CLINIC LOCATIONS:   MONDAY - [place] TUESDAY - [place]   [address]  [address]   [place] [address] [place] [address]   [phone]       THURSDAY AM - [place] THURSDAY PM - [place]   [address]   [place] [address] [place], [place] [address] [phone]       FRIDAY AM - [place] SET UP SURGERY: [phone] [address] APPOINTMENTS: [phone]   [place] [address] BILLING QUESTIONS: [phone] FAX NUMBER: [phone]     Follow Up: in 6 months    Tips for treating painful calluses:    1.  Pumice stone/gabriel board therapy multiple times weekly to remove callus tissue as it builds up    2.  Good supportive shoes and minimizing barefoot ambulation can slow down callus formation, and can decrease pain levels    3.  Gel inserts can also provide padding to the bottom of the foot, to prevent pain and slow recurrence.    4.  Applying lotion daily/twice daily to the callus tissue can keep it softer and less painful.  Lotions with lactic acid or urea work well, but most lotions are sufficient      Body Mass Index (BMI)  Many things can cause foot and ankle problems. Foot structure, activity level, foot mechanics and injuries are common causes of pain. One very important issue that often goes unmentioned, is body weight. Extra weight can cause increased stress on muscles, ligaments, bones and tendons. Sometimes just a few extra pounds is all it takes to put one over her/his threshold. Without reducing that stress, it can be difficult to alleviate pain. Some people are uncomfortable addressing this issue, but we feel it is important for you to think about it. As Foot &  Ankle specialists, our job is addressing the lower extremity problem and possible causes. Regarding extra body weight, we encourage patients to discuss diet and weight management plans with their primary care doctors. It  is this team approach that gives you the best opportunity for pain relief and getting you back on your feet.

## 2017-10-26 NOTE — MR AVS SNAPSHOT
After Visit Summary   10/26/2017    Dustin Pearce    MRN: [id number]           Patient Information     Date Of Birth          1/31/1928        Visit Information        Provider Department      10/26/2017 9:30 AM Nabil Velazquez DPM [facility]        Today's Diagnoses     S/P foot surgery, left    -  1      Care Instructions      DR. VELAZQUEZ'S CLINIC LOCATIONS:   MONDAY - [place] TUESDAY - [place]   [address]  [address]   [place] [address] [place], [place] [address] [phone]       THURSDAY AM - [place] THURSDAY PM - [place]   [address]   [place] [address] [place], [place] [address] [phone]       FRIDAY AM - [place] SET UP SURGERY: [phone] [address] APPOINTMENTS: [phone]   [place] [address] BILLING QUESTIONS: [phone] FAX NUMBER: [phone]     Follow Up: in 6 months    Tips for treating painful calluses:    1.  Pumice stone/gabriel board therapy multiple times weekly to remove callus tissue as it builds up    2.  Good supportive shoes and minimizing barefoot ambulation can slow down callus formation, and can decrease pain levels    3.  Gel inserts can also provide padding to the bottom of the foot, to prevent pain and slow recurrence.    4.  Applying lotion daily/twice daily to the callus tissue can keep it softer and less painful.  Lotions with lactic acid or urea work well, but most lotions are sufficient      Body Mass Index (BMI)  Many things can cause foot and ankle problems. Foot structure, activity level, foot mechanics and injuries are common causes of pain. One very important issue that often goes unmentioned, is body weight. Extra weight can cause increased stress on muscles, ligaments, bones and tendons. Sometimes just a few extra pounds is all it takes to put one over her/his threshold. Without reducing that stress, it can be difficult  to alleviate pain. Some people are uncomfortable addressing this issue, but we feel it is important for you to think about it. As Foot &  Ankle specialists, our job is addressing the lower extremity problem and possible causes. Regarding extra body weight, we encourage patients to discuss diet and weight management plans with their primary care doctors. It is this team approach that gives you the best opportunity for pain relief and getting you back on your feet.            Follow-ups after your visit        Your next 10 appointments already scheduled     Nov 03, 2017  7:30 AM CDT   Return Visit with Chrissy Padgett PA-C   [facility] PHYS[facility] AT [place] ([facility])    [address]  [facility] [address]   [phone]            Dec 14, 2017  9:45 AM CST   Return Visit with Rafa Samuel MD   [facility] HEART AT [place] ([facility])    [address]  [facility] [address]   [phone]              Future tests that were ordered for you today     Open Future Orders        Priority Expected Expires Ordered    EKG 12-lead complete w/read - Clinics (to be scheduled) Routine 11/2/2017 10/26/2018 10/26/2017    Follow-Up with Cardiac [facility] Provider Routine 11/2/2017 10/26/2018 10/26/2017    US Abdominal Aorta Imaging Routine 6/1/2018 10/12/2018 10/25/2017    US KRYSTIAN Doppler with Exercise Bilateral Routine 6/1/2018 10/12/2018 10/25/2017    US Carotid Right Routine 6/1/2018 10/25/2018 10/25/2017    US Lower Extremity Arterial Duplex Left Routine 6/1/2018 10/12/2018 10/25/2017            Who to contact     If you have questions or need follow up information about today's clinic visit or your schedule please contact [facility] directly at [phone].  Normal or non-critical lab and imaging results will be communicated to you by MyChart, letter or phone within 4 business days after the clinic  "has received the results. If you do not hear from us within 7 days, please contact the clinic through Buzzmetrics or phone. If you have a critical or abnormal lab result, we will notify you by phone as soon as possible.  Submit refill requests through Buzzmetrics or call your pharmacy and they will forward the refill request to us. Please allow 3 business days for your refill to be completed.          Additional Information About Your Visit        Care EveryWhere ID     This is your Care EveryWhere ID. This could be used by other organizations to access your [facility] medical records  [id number]        Your Vitals Were     Pulse Height BMI (Body Mass Index)             64 5' 5\" (1.651 m) 27.96 kg/m2          Blood Pressure from Last 3 Encounters:   10/26/17 129/54   10/26/17 110/60   10/12/17 136/61    Weight from Last 3 Encounters:   10/26/17 168 lb (76.2 kg)   10/26/17 168 lb (76.2 kg)   09/28/17 166 lb 14.4 oz (75.7 kg)                 Today's Medication Changes          These changes are accurate as of: 10/26/17  9:44 AM.  If you have any questions, ask your nurse or doctor.               Start taking these medicines.        Dose/Directions    metoprolol 25 MG 24 hr tablet   Commonly known as:  TOPROL-XL   Indication:  High Blood Pressure Disorder   Used for:  Cardiomyopathy, unspecified type (H)   Started by:  Chrissy Padgett PA-C        Dose:  12.5 mg   Take 0.5 tablets (12.5 mg) by mouth daily   Refills:  0            Where to get your medicines      Some of these will need a paper prescription and others can be bought over the counter.  Ask your nurse if you have questions.     You don't need a prescription for these medications     metoprolol 25 MG 24 hr tablet                Primary Care Provider Office Phone # Fax #    Matt Morrissey [phone]       [facility] [address] [address]        Equal Access to Services     LAURENCE JACOBSON AH: Hadii aad ku " shayy Brooks, wakanada lueelanoradaha, qaybta kalavelle kirby, maurice dixon floresitahelena svetahallie laluisafshin ruben. So [facility] [phone].    ATENCIÓN: Si habla aimee, tiene a garvey disposición servicios gratuitos de asistencia lingüística. Jose Luis al [phone].    We comply with applicable federal civil rights laws and [place] laws. We do not discriminate on the basis of race, color, national origin, age, disability, sex, sexual orientation, or gender identity.            Thank you!     Thank you for choosing [facility]  for your care. Our goal is always to provide you with excellent care. Hearing back from our patients is one way we can continue to improve our services. Please take a few minutes to complete the written survey that you may receive in the mail after your visit with us. Thank you!             Your Updated Medication List - Protect others around you: Learn how to safely use, store and throw away your medicines at www.disposemymeds.org.          This list is accurate as of: 10/26/17  9:44 AM.  Always use your most recent med list.                   Brand Name Dispense Instructions for use Diagnosis    AMITRIPTYLINE HCL PO      Take 25 mg by mouth nightly as needed for sleep        aspirin 81 MG EC tablet     30 tablet    Take 1 tablet (81 mg) by mouth daily    Transient cerebral ischemia, unspecified type       ATORVASTATIN CALCIUM PO      Take 80 mg by mouth daily        blood glucose monitoring test strip    no brand specified    300 each    Use to test blood sugar three times daily or as directed.    Hypoglycemia       DULoxetine 30 MG EC capsule    CYMBALTA    90 capsule    Take 1 capsule (30 mg) by mouth daily    Polyneuropathy associated with underlying disease (H)       glipiZIDE 10 MG tablet    GLUCOTROL    180 tablet    Take 1 tablet (10 mg) by mouth 2 times daily (before meals)    Type 2 diabetes mellitus with diabetic peripheral angiopathy without gangrene, without long-term current use of  insulin (H)       HYDROcodone-acetaminophen 5-325 MG per tablet    NORCO     Take 1 tablet by mouth every 4 hours as needed for moderate to severe pain Give 1 tablet by mouth every 4 hours as needed for Pain 1-5/10 AND Give 2 tablet by mouth every 4 hours as needed for Pain 6-10/10        ipratropium 0.03 % spray    ATROVENT     Spray 2 sprays into both nostrils every 12 hours        METAMUCIL PO      Take 1 packet by mouth daily as needed        METFORMIN HCL PO      Take 1,000 mg by mouth 2 times daily (with meals)        metoprolol 25 MG 24 hr tablet    TOPROL-XL     Take 0.5 tablets (12.5 mg) by mouth daily    Cardiomyopathy, unspecified type (H)       omeprazole 40 MG capsule    priLOSEC    90 capsule    Take 1 capsule (40 mg) by mouth daily Take 30-60 minutes before a meal.    Other acute gastritis without hemorrhage       order for DME     1 Device    Equipment being ordered: True Matrix Blood Glucose meter.    Type 2 diabetes mellitus without complication (H)       PLAVIX PO      Take 75 mg by mouth daily    Cough       tamsulosin 0.4 MG capsule    FLOMAX    90 capsule    Take 1 capsule (0.4 mg) by mouth daily    Benign non-nodular prostatic hyperplasia with lower urinary tract symptoms

## 2017-10-26 NOTE — LETTER
"10/26/2017    Matt Morrissey MD  [facility]  [address]  [place] [address]    RE: Dustin Pearce       Dear Colleague,    I had the pleasure of seeing Dustin ALEJANDRA Pearce in the [facility].    HPI:   I had the pleasure of meeting Dustin today when he came accompanied by his friend Halina for concerns regarding \"passing out.\" He is a pleasant 89-year-old who has a history of cerebrovascular accident in 1/2017. Carotid evaluation at that time showed an occluded left internal carotid artery and a 50% lesion in the right. An echocardiogram 1/2017 showed an EF of roughly 35-40% with severe inferolateral hypokinesis, mild aortic stenosis and mild dilatation of the aortic root. He saw Dr. Samuel, and they discussed workup of his cardiomyopathy, and ultimately opted for a stress test 3/2017 showing a medium sized perfusion defect of severe intensity which was only mildly reversible. Given this very mild amount of ezekiel-infarct ischemia and no symptoms, medical management was recommended. He also has hypertension, dyslipidemia, and diabetes.     In 5/2017 he was admitted through the ER for a left foot infection/cellulitis. He ultimately ended up undergoing left common iliac artery stent implantation (10 mm and (with left SFA angioplasty 6/2. I believe Dr. Rodriguez performed this. 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 He continues routine follow-up with podiatry and vascular surgery.    He states that not long after that, he started having episodes where he would get acutely lightheaded after standing. This is now been going on for roughly 6 months, and he sought attention in 9/2017 through his primary care office. Was recommended that he wear a monitor and decrease his metoprolol. It was also recommended he seek cardiology and was placed on my schedule today. Unfortunately, Dustin and Halina decided he would not wear a monitor given that she " "had previously had a monitor worn and it had never been able to capture any abnormalities.    He describes episodes of at least 3-5 times a week where he stands up and feels very lightheaded/dizzy. He has fallen, but not injured himself. He denies palpitations, chest pain, shortness of breath or \"spinning\" associated. He and Halina do think that the episodes have improved in frequency and severity since reducing the metoprolol. Of note, the records indicate that at that time he had been on metoprolol succinate 25 mg and this should been decreased to 12.5 mg by the nurse practitioner he saw on 9/28. Halina tells me that actually, he had 50 mg tablet so he just reduce this to 25 mg. I have updated EPIC to reflect that he has been on the 25 mg of metoprolol succinate.    He tells me that this morning he had a \"bad episode\" where he was standing while eating a banana and became so lightheaded that he almost fell. He did not fall or injure himself. He was able to sit down and this passed within a minute. He states that the has been the worst it's been for a while.    During our visit today, he told me that he felt \"flushed\" and asked me to check his temperature. Unfortunately we do not have a thermometer, and he told me that the sensation passed. Soon afterwards, he started vomiting into the trash can. His friend Halina tells me that this is not an unusual occurrence for him, and she keeps a bucket in the car for him in case he gets carsick. He states that sometimes he will feel hot and vomit, but does not have any palpitations or associated lightheaded spells with this.       Assessment & Plan:    1. Lightheadedness and falls, with orthostatic BP drop today in clinic - this is certainly concerning as it is been going on for about 6 months. I checked orthostatic blood pressures today, and they have indeed dropped immediately upon standing systolic blood pressure was initially 142 mmHg, then dropped to 120 systolically. " "After 3 minutes, his blood pressure rebounded to about 146 mm Hg while continuing to stand.    I've encouraged him to remain hydrated, consider wearing compression stockings, and start doing \"ankle pumps\" to encourage venous return prior to standing. I have strongly encouraged wearing a 30 day event monitor to help insure there is no evidence of skin complete heart block or bradyarrhythmias as a cause of these episodes, but he remained very reticent to this. I explained that just because Halina  did not have any episodes while wearing the monitors in the 1990s, there is no indication that he would be as unlucky.  Due to his near-syncope, I would not get a ZioPatch given the length of time it takes to get results.    As above, his practitioner believed he was on 25 mg of metoprolol when indeed he was excellent and 50. He is currently taking 25 mg (half of the 50 mg tablet) and we will decrease this.    I've explained the importance of close follow-up given these concerning symptoms.     * Decrease metoprolol XL 12.5 mg daily   * Compression stockings, fluid support, ankle \"pumps\" before standing   * Get balance before walking   * See me in 1 week. If no improvement, they may consider wearing 30 day Event Monitor to evaluate for arrhythmias as a cause of his episodes.    2. Vomiting - he was complaining of feeling a bit warm and then vomited. Heart rate by palpation at the radial site was still normal, and it did not appear that he had a bradycardic event like in a vagal response.  Halina tells me this is not new for him, and she actually keeps a container in the car in case he vomits. He denies any diarrhea or other gastrointestinal issues. He will continue to monitor this. I did offer to get some blood work on his way out but as this is happened before, they are comfortable not obtaining blood work.    3. CAD with ischemic CM - this appears quiescent. Continue medications.    4. PAD - he diminished pulses bilaterally, " and sees Dr. Rodriguez/DERIC Monsalve routinely. He is known to have carotid disease, lower extremity disease and was recently diagnosed with an abdominal aortic aneurysm.      Sruthi Padgett PA-C, MSPAS      Orders Placed This Encounter   Procedures     Follow-Up with Cardiac Advanced Practice Provider     EKG 12-lead complete w/read - Clinics (to be scheduled)     Orders Placed This Encounter   Medications     DISCONTD: metoprolol (TOPROL-XL) 25 MG 24 hr tablet     Sig: Take 1 tablet (25 mg) by mouth daily     metoprolol (TOPROL-XL) 25 MG 24 hr tablet     Sig: Take 0.5 tablets (12.5 mg) by mouth daily     Medications Discontinued During This Encounter   Medication Reason     metoprolol (TOPROL-XL) 25 MG 24 hr tablet Reorder     metoprolol (TOPROL-XL) 25 MG 24 hr tablet Reorder         Encounter Diagnoses   Name Primary?     Cardiomyopathy, unspecified type (H)      Lightheadedness Yes       CURRENT MEDICATIONS:  Current Outpatient Prescriptions   Medication Sig Dispense Refill     metoprolol (TOPROL-XL) 25 MG 24 hr tablet Take 0.5 tablets (12.5 mg) by mouth daily       glipiZIDE (GLUCOTROL) 10 MG tablet Take 1 tablet (10 mg) by mouth 2 times daily (before meals) 180 tablet 3     HYDROcodone-acetaminophen (NORCO) 5-325 MG per tablet Take 1 tablet by mouth every 4 hours as needed for moderate to severe pain Give 1 tablet by mouth every 4 hours as needed for Pain 1-5/10 AND Give 2 tablet by mouth every 4 hours as needed for Pain 6-10/10       AMITRIPTYLINE HCL PO Take 25 mg by mouth nightly as needed for sleep       ipratropium (ATROVENT) 0.03 % spray Spray 2 sprays into both nostrils every 12 hours       DULoxetine (CYMBALTA) 30 MG EC capsule Take 1 capsule (30 mg) by mouth daily 90 capsule 3     omeprazole (PRILOSEC) 40 MG capsule Take 1 capsule (40 mg) by mouth daily Take 30-60 minutes before a meal. (Patient not taking: Reported on 10/26/2017) 90 capsule 3     tamsulosin (FLOMAX) 0.4 MG capsule Take 1 capsule (0.4 mg) by  "mouth daily 90 capsule 3     aspirin EC 81 MG EC tablet Take 1 tablet (81 mg) by mouth daily 30 tablet 0     METFORMIN HCL PO Take 1,000 mg by mouth 2 times daily (with meals)       ATORVASTATIN CALCIUM PO Take 80 mg by mouth daily       blood glucose monitoring (NO BRAND SPECIFIED) test strip Use to test blood sugar three times daily or as directed. 300 each 3     order for DME Equipment being ordered: True Matrix Blood Glucose meter. 1 Device 0     Psyllium (METAMUCIL PO) Take 1 packet by mouth daily as needed        Clopidogrel Bisulfate, [id number], (PLAVIX PO) Take 75 mg by mouth daily        [DISCONTINUED] metoprolol (TOPROL-XL) 25 MG 24 hr tablet Take 1 tablet (25 mg) by mouth daily       [DISCONTINUED] metoprolol (TOPROL-XL) 25 MG 24 hr tablet Take 0.5 tablets (12.5 mg) by mouth daily (Patient taking differently: Take 25 mg by mouth daily ) 45 tablet 0       ALLERGIES     Allergies   Allergen Reactions     Oxycodone Other (See Comments)     \"TERRIBLE SWEATING\"     Sulfa Drugs      Tetracycline        PAST MEDICAL HISTORY:  Past Medical History:   Diagnosis Date     Aortic root dilatation (H)      Benign essential hypertension 1/6/2017     Benign non-nodular prostatic hyperplasia with lower urinary tract symptoms 10/20/2016     CAD (coronary artery disease)      Cardiomyopathy (H)      Carotid artery stenosis 10/20/2016     Carotid occlusion, left      Coronary artery disease involving native coronary artery of native heart without angina pectoris 10/20/2016     Diabetes mellitus (H)      DJD (degenerative joint disease)      Gastro-oesophageal reflux disease      Gastroesophageal reflux disease without esophagitis 10/20/2016     Heart attack      Hyperlipidemia      Hypertension      Mild cognitive impairment 10/20/2016     Nephrolithiasis     RECURRENT     Osteopenia      Paroxysmal atrial fibrillation (H)      PONV (postoperative nausea and vomiting)      Unspecified cerebral artery occlusion with cerebral " infarction        PAST SURGICAL HISTORY:  Past Surgical History:   Procedure Laterality Date     AMPUTATE FOOT Left 6/4/2017    Procedure: AMPUTATE FOOT;  Sun Add#3: partial left foot amputation - Sagital Saw - Mini C-Arm;  Surgeon: Moe Kirk DPM;  Location:  OR     CHOLECYSTECTOMY       ESOPHAGOSCOPY, GASTROSCOPY, DUODENOSCOPY (EGD), COMBINED N/A 12/26/2016    Procedure: COMBINED ESOPHAGOSCOPY, GASTROSCOPY, DUODENOSCOPY (EGD);  Surgeon: Nasim Louis MD;  Location:  GI     GENITOURINARY SURGERY      KIDNEY STONE REMOVAL X 4     HC UGI ENDOSCOPY W EUS N/A 12/29/2016    Procedure: COMBINED ENDOSCOPIC ULTRASOUND, ESOPHAGOSCOPY, GASTROSCOPY, DUODENOSCOPY (EGD);  Surgeon: Nasim Louis MD;  Location:  GI     HERNIA REPAIR       INCISION AND DRAINAGE FOOT, COMBINED Left 6/6/2017    Procedure: COMBINED INCISION AND DRAINAGE FOOT;  REVISIONAL LEFT FOOT INCISION AND DRAINAGE WITH POSSIBLE FLAP CLOSURE , ANTIBIOTIC SOLUTION ;  Surgeon: Nabil Ann DPM;  Location:  OR     LASER HOLMIUM LITHOTRIPSY URETER(S), INSERT STENT, COMBINED  3/2/2012    Procedure:COMBINED CYSTOSCOPY, URETEROSCOPY, LASER HOLMIUM LITHOTRIPSY URETER(S), INSERT STENT; CYSTOSCOPY, RIGHT RETROGRADES, RIGHT URETEROSCOPY, HOLMIUM LASER, RIGHT STENT PLACEMENT; Surgeon:ANJELICA LEÓN; Location: OR     ROTATOR CUFF REPAIR RT/LT         FAMILY HISTORY:  Family History   Problem Relation Age of Onset     Breast Cancer Mother      Heart Failure Father 81       SOCIAL HISTORY:  Social History     Social History     Marital status:      Spouse name: N/A     Number of children: N/A     Years of education: N/A     Social History Main Topics     Smoking status: Former Smoker     Packs/day: 1.00     Years: 30.00     Types: Cigarettes     Quit date: 1/1/1999     Smokeless tobacco: Never Used     Alcohol use 4.2 oz/week     7 Standard drinks or equivalent per week      Comment: 1 drink per week     Drug use: No  "    Sexual activity: Not Currently     Partners: Female     Other Topics Concern     None     Social History Narrative       Review of Systems:  Skin:        Eyes:       ENT:       Respiratory:       Cardiovascular:       Gastroenterology:      Genitourinary:       Musculoskeletal:       Neurologic:       Psychiatric:       Heme/Lymph/Imm:       Endocrine:         Physical Exam:  Vitals: /60  Pulse 60  Ht 1.651 m (5' 5\")  Wt 76.2 kg (168 lb)  BMI 27.96 kg/m2    Constitutional:           Skin:           Head:           Eyes:           ENT:           Neck:           Chest:           Cardiac:                    Abdomen:           Vascular:                                        Extremities and Back:           Neurological:             Recent Lab Results:  LIPID RESULTS:  Lab Results   Component Value Date    CHOL 165 01/21/2017    HDL 48 01/21/2017    LDL 87 01/21/2017    TRIG 148 01/21/2017    CHOLHDLRATIO 5.1 (H) 12/18/2015       LIVER ENZYME RESULTS:  Lab Results   Component Value Date    AST 43 12/27/2016    ALT 50 12/27/2016       CBC RESULTS:  Lab Results   Component Value Date    WBC 7.9 06/09/2017    RBC 3.53 (L) 06/09/2017    HGB 12.7 (L) 09/28/2017    HCT 32.8 (L) 06/09/2017    MCV 93 06/09/2017    MCH 30.6 06/09/2017    MCHC 32.9 06/09/2017    RDW 13.9 06/09/2017     06/09/2017       BMP RESULTS:  Lab Results   Component Value Date     06/13/2017    POTASSIUM 4.6 06/13/2017    CHLORIDE 107 06/13/2017    CO2 28 06/13/2017    ANIONGAP 7 06/13/2017     06/13/2017    BUN 20 06/13/2017    CR 0.68 (L) 06/13/2017    GFRESTIMATED >60 06/13/2017    GFRESTBLACK >60 06/13/2017    ALLISON 9.4 06/13/2017        A1C RESULTS:  Lab Results   Component Value Date    A1C 6.7 (H) 07/24/2017       INR RESULTS:  Lab Results   Component Value Date    INR 1.03 01/20/2017    INR 0.99 08/19/2009     Thank you for allowing me to participate in the care of your patient.    Sincerely,     Chrissy Dykes " VANGIE Padgett     [facility]

## 2017-10-26 NOTE — PROGRESS NOTES
"Foot & Ankle Surgery  October 26, 2017    S:  Patient in today approx 3.5 months sp partial Left 5th ray resection by Dr Kirk with revision and closure by myself 2 days later - DOS 6/4/2017 & 6/6/2017.  Pain levels minimal.  He noticed a bump on the bottom of his foot when applying his \"salve\".  He's taking his diabetic shoes back in today, apparently they aren't fitting properly. He's full WB in shoes and the surgical site is fully healed.    /54 (BP Location: Right arm, Patient Position: Sitting, Cuff Size: Adult Large)  Pulse 64  Ht 5' 5\" (1.651 m)  Wt 168 lb (76.2 kg)  BMI 27.96 kg/m2      ROS - positive for CC.  Patient denies current nausea, vomiting, chills, fevers, belly pain, calf pain, chest pain or SOB.  Complete remainder of ROS is otherwise neg.    PE - incision fully healed, no issues noted.  Small IPK plantar base of 5th metatarsal.  No other pressure points or pre-ulcerative lesions noted.  Skin shows no trophic, color or temperature changes otherwise.  No calf redness, swelling or pain noted otherwise.    Imaging - final post-op xrays - IMPRESSION:  Redemonstrated is amputation at the mid shaft of the fifth metatarsal. No evidence for complication. Vascular calcifications suggest diabetes.     A/P - 89 year old yo patient approx 3.5 months sp above procedure  -callus-pre-ulcerative lesion left 5th met base debrided with 15 blade without incident  -home callus handout dispensed for callus cares, in hopes of minimizing new pressure wounds  -have orthotics/shoes adjusted as needed  -continue to closely monitor feet.  Discussed that with a partial foot amputation that how he stands/walks on his foot has changed, and it can lead to new wounds    Follow up  -  6 months or sooner with acute issues      The patient is very happy with their current post-op course and to-date operative outcome.    Body mass index is 27.96 kg/(m^2).          Nabil Ann, ZACHARYM   Podiatric Foot & Ankle " Surgeon  [facility]  [phone]

## 2017-10-26 NOTE — MR AVS SNAPSHOT
After Visit Summary   10/26/2017    Dustin Pearce    MRN: [id number]           Patient Information     Date Of Birth          1/31/1928        Visit Information        Provider Department      10/26/2017 8:00 AM Chrissy Padgett PA-C [facility] [place]        Today's Diagnoses     Lightheadedness    -  1    Cardiomyopathy, unspecified type (H)          Care Instructions    1. Discussed dizzy spells - I'm concerned your BP may drop too much when you first stand up (orthostasis)    2. Decrease metoprolol XL from 25 mg to 12.5 mg daily (1/2 tablet)    3. Before you stand, START ANKLE PUMPS/LEG RAISES TO BRING BLOOD PRESSURE UP.    4. Consider wearing support stockings to help keep BP up    5. See me in about 10 days with an update - we'll recheck BPs, etc and determine if monitor is needed.              Follow-ups after your visit        Additional Services     Follow-Up with Cardiac Advanced Practice Provider                 Your next 10 appointments already scheduled     Oct 26, 2017  9:30 AM CDT   [facility]abil [facility] DPM   [facility]    [address] [address]   [phone]            Dec 14, 2017  9:45 AM CST   Return Visit with Rafa Samuel MD   [facility] ([facility])    [address]  [place] [address]   [phone]              Future tests that were ordered for you today     Open Future Orders        Priority Expected Expires Ordered    EKG 12-lead complete w/read - Clinics (to be scheduled) Routine 11/2/2017 10/26/2018 10/26/2017    Follow-Up with Cardiac Advanced Practice Provider Routine 11/2/2017 10/26/2018 10/26/2017    US Abdominal Aorta Imaging Routine 6/1/2018 10/12/2018 10/25/2017    US KRYSTINA Doppler with Exercise Bilateral Routine 6/1/2018 10/12/2018 10/25/2017    US Carotid Right Routine  "6/1/2018 10/25/2018 10/25/2017     Lower Extremity Arterial Duplex Left Routine 6/1/2018 10/12/2018 10/25/2017            Who to contact     If you have questions or need follow up information about today's clinic visit or your schedule please contact [facility] [place] directly at [phone].  Normal or non-critical lab and imaging results will be communicated to you by MyChart, letter or phone within 4 business days after the clinic has received the results. If you do not hear from us within 7 days, please contact the clinic through MyChart or phone. If you have a critical or abnormal lab result, we will notify you by phone as soon as possible.  Submit refill requests through [employer] or call your pharmacy and they will forward the refill request to us. Please allow 3 business days for your refill to be completed.          Additional Information About Your Visit        Care EveryWhere ID     This is your Care EveryWhere ID. This could be used by other organizations to access your [facility] medical records  [id number]        Your Vitals Were     Pulse Height BMI (Body Mass Index)             60 1.651 m (5' 5\") 27.96 kg/m2          Blood Pressure from Last 3 Encounters:   10/26/17 110/60   10/12/17 136/61   10/03/17 118/58    Weight from Last 3 Encounters:   10/26/17 76.2 kg (168 lb)   09/28/17 75.7 kg (166 lb 14.4 oz)   07/27/17 73.9 kg (163 lb)                 Today's Medication Changes          These changes are accurate as of: 10/26/17  8:40 AM.  If you have any questions, ask your nurse or doctor.               Start taking these medicines.        Dose/Directions    metoprolol 25 MG 24 hr tablet   Commonly known as:  TOPROL-XL   Indication:  High Blood Pressure Disorder   Used for:  Cardiomyopathy, unspecified type (H)   Started by:  Chrissy Padgett PA-C        Dose:  12.5 mg   Take 0.5 tablets (12.5 mg) by mouth daily   Refills:  0            Where to get your " medicines      Some of these will need a paper prescription and others can be bought over the counter.  Ask your nurse if you have questions.     You don't need a prescription for these medications     metoprolol 25 MG 24 hr tablet                Primary Care Provider Office Phone # Fax #    Matt Morrissey [phone]       [facility] [address]  [facility] [address]        Equal Access to Services     LAURENCE JACOBSON : Hadii aad ku hadasho Soomaali, waaxda luqadaha, qaybta kaalmada adeegyada, waxay idiin hayaan adeeg kharash la'tina . So [facility] [phone].    ATENCIÓN: Si habla español, tiene a garvey disposición servicios gratuitos de asistencia lingüística. Jose Luis al [phone].    We comply with applicable federal civil rights laws and [place] laws. We do not discriminate on the basis of race, color, national origin, age, disability, sex, sexual orientation, or gender identity.            Thank you!     Thank you for choosing [facility] [place]  for your care. Our goal is always to provide you with excellent care. Hearing back from our patients is one way we can continue to improve our services. Please take a few minutes to complete the written survey that you may receive in the mail after your visit with us. Thank you!             Your Updated Medication List - Protect others around you: Learn how to safely use, store and throw away your medicines at www.disposemymeds.org.          This list is accurate as of: 10/26/17  8:40 AM.  Always use your most recent med list.                   Brand Name Dispense Instructions for use Diagnosis    AMITRIPTYLINE HCL PO      Take 25 mg by mouth nightly as needed for sleep        aspirin 81 MG EC tablet     30 tablet    Take 1 tablet (81 mg) by mouth daily    Transient cerebral ischemia, unspecified type       ATORVASTATIN CALCIUM PO      Take 80 mg by mouth daily        blood glucose monitoring test  strip    no brand specified    300 each    Use to test blood sugar three times daily or as directed.    Hypoglycemia       DULoxetine 30 MG EC capsule    CYMBALTA    90 capsule    Take 1 capsule (30 mg) by mouth daily    Polyneuropathy associated with underlying disease (H)       glipiZIDE 10 MG tablet    GLUCOTROL    180 tablet    Take 1 tablet (10 mg) by mouth 2 times daily (before meals)    Type 2 diabetes mellitus with diabetic peripheral angiopathy without gangrene, without long-term current use of insulin (H)       HYDROcodone-acetaminophen 5-325 MG per tablet    NORCO     Take 1 tablet by mouth every 4 hours as needed for moderate to severe pain Give 1 tablet by mouth every 4 hours as needed for Pain 1-5/10 AND Give 2 tablet by mouth every 4 hours as needed for Pain 6-10/10        ipratropium 0.03 % spray    ATROVENT     Spray 2 sprays into both nostrils every 12 hours        METAMUCIL PO      Take 1 packet by mouth daily as needed        METFORMIN HCL PO      Take 1,000 mg by mouth 2 times daily (with meals)        metoprolol 25 MG 24 hr tablet    TOPROL-XL     Take 0.5 tablets (12.5 mg) by mouth daily    Cardiomyopathy, unspecified type (H)       NONFORMULARY      1 packet 2 times daily L-Emental:Arginade Supplement BID for wound healing        omeprazole 40 MG capsule    priLOSEC    90 capsule    Take 1 capsule (40 mg) by mouth daily Take 30-60 minutes before a meal.    Other acute gastritis without hemorrhage       order for DME     1 Device    Equipment being ordered: [employer] Blood Glucose meter.    Type 2 diabetes mellitus without complication (H)       PLAVIX PO      Take 75 mg by mouth daily    Cough       tamsulosin 0.4 MG capsule    FLOMAX    90 capsule    Take 1 capsule (0.4 mg) by mouth daily    Benign non-nodular prostatic hyperplasia with lower urinary tract symptoms

## 2017-10-26 NOTE — PATIENT INSTRUCTIONS
1. Discussed dizzy spells - I'm concerned your BP may drop too much when you first stand up (orthostasis)    2. Decrease metoprolol XL from 25 mg to 12.5 mg daily (1/2 tablet)    3. Before you stand, START ANKLE PUMPS/LEG RAISES TO BRING BLOOD PRESSURE UP.    4. Consider wearing support stockings to help keep BP up    5. See me in about 10 days with an update - we'll recheck BPs, etc and determine if monitor is needed.

## 2017-10-26 NOTE — PROGRESS NOTES
"HPI:   I had the pleasure of meeting Dustin today when he came accompanied by his friend Halina for concerns regarding \"passing out.\" He is a pleasant 89-year-old who has a history of cerebrovascular accident in 1/2017. Carotid evaluation at that time showed an occluded left internal carotid artery and a 50% lesion in the right. An echocardiogram 1/2017 showed an EF of roughly 35-40% with severe inferolateral hypokinesis, mild aortic stenosis and mild dilatation of the aortic root. He saw Dr. Samuel, and they discussed workup of his cardiomyopathy, and ultimately opted for a stress test 3/2017 showing a medium sized perfusion defect of severe intensity which was only mildly reversible. Given this very mild amount of ezekiel-infarct ischemia and no symptoms, medical management was recommended. He also has hypertension, dyslipidemia, and diabetes.     In 5/2017 he was admitted through the ER for a left foot infection/cellulitis. He ultimately ended up undergoing left common iliac artery stent implantation (10 mm and (with left SFA angioplasty 6/2. I believe Dr. Rodriguez performed this. 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 He continues routine follow-up with podiatry and vascular surgery.    He states that not long after that, he started having episodes where he would get acutely lightheaded after standing. This is now been going on for roughly 6 months, and he sought attention in 9/2017 through his primary care office. Was recommended that he wear a monitor and decrease his metoprolol. It was also recommended he seek cardiology and was placed on my schedule today. Unfortunately, Dustin and Halina decided he would not wear a monitor given that she had previously had a monitor worn and it had never been able to capture any abnormalities.    He describes episodes of at least 3-5 times a week where he stands up and feels very lightheaded/dizzy. He has fallen, but not injured himself. He " "denies palpitations, chest pain, shortness of breath or \"spinning\" associated. He and Halina do think that the episodes have improved in frequency and severity since reducing the metoprolol. Of note, the records indicate that at that time he had been on metoprolol succinate 25 mg and this should been decreased to 12.5 mg by the nurse practitioner he saw on 9/28. Halina tells me that actually, he had 50 mg tablet so he just reduce this to 25 mg. I have updated EPIC to reflect that he has been on the 25 mg of metoprolol succinate.    He tells me that this morning he had a \"bad episode\" where he was standing while eating a banana and became so lightheaded that he almost fell. He did not fall or injure himself. He was able to sit down and this passed within a minute. He states that the has been the worst it's been for a while.    During our visit today, he told me that he felt \"flushed\" and asked me to check his temperature. Unfortunately we do not have a thermometer, and he told me that the sensation passed. Soon afterwards, he started vomiting into the trash can. His friend Halina tells me that this is not an unusual occurrence for him, and she keeps a bucket in the car for him in case he gets carsick. He states that sometimes he will feel hot and vomit, but does not have any palpitations or associated lightheaded spells with this.       Assessment & Plan:    1. Lightheadedness and falls, with orthostatic BP drop today in clinic - this is certainly concerning as it is been going on for about 6 months. I checked orthostatic blood pressures today, and they have indeed dropped immediately upon standing systolic blood pressure was initially 142 mmHg, then dropped to 120 systolically. After 3 minutes, his blood pressure rebounded to about 146 mm Hg while continuing to stand.    I've encouraged him to remain hydrated, consider wearing compression stockings, and start doing \"ankle pumps\" to encourage venous return prior to " "standing. I have strongly encouraged wearing a 30 day event monitor to help insure there is no evidence of skin complete heart block or bradyarrhythmias as a cause of these episodes, but he remained very reticent to this. I explained that just because Halina  did not have any episodes while wearing the monitors in the 1990s, there is no indication that he would be as unlucky.  Due to his near-syncope, I would not get a ZioPatch given the length of time it takes to get results.    As above, his practitioner believed he was on 25 mg of metoprolol when indeed he was excellent and 50. He is currently taking 25 mg (half of the 50 mg tablet) and we will decrease this.    I've explained the importance of close follow-up given these concerning symptoms.     * Decrease metoprolol XL 12.5 mg daily   * Compression stockings, fluid support, ankle \"pumps\" before standing   * Get balance before walking   * See me in 1 week. If no improvement, they may consider wearing 30 day Event Monitor to evaluate for arrhythmias as a cause of his episodes.    2. Vomiting - he was complaining of feeling a bit warm and then vomited. Heart rate by palpation at the radial site was still normal, and it did not appear that he had a bradycardic event like in a vagal response.  Halina tells me this is not new for him, and she actually keeps a container in the car in case he vomits. He denies any diarrhea or other gastrointestinal issues. He will continue to monitor this. I did offer to get some blood work on his way out but as this is happened before, they are comfortable not obtaining blood work.    3. CAD with ischemic CM - this appears quiescent. Continue medications.    4. PAD - he diminished pulses bilaterally, and sees Dr. Rodriguez/DERIC Monsalve routinely. He is known to have carotid disease, lower extremity disease and was recently diagnosed with an abdominal aortic aneurysm.      Sruthi Padgett PA-C, [facility]      Orders Placed This Encounter   Procedures "     Follow-Up with Cardiac Advanced Practice Provider     EKG 12-lead complete w/read - Clinics (to be scheduled)     Orders Placed This Encounter   Medications     DISCONTD: metoprolol (TOPROL-XL) 25 MG 24 hr tablet     Sig: Take 1 tablet (25 mg) by mouth daily     metoprolol (TOPROL-XL) 25 MG 24 hr tablet     Sig: Take 0.5 tablets (12.5 mg) by mouth daily     Medications Discontinued During This Encounter   Medication Reason     metoprolol (TOPROL-XL) 25 MG 24 hr tablet Reorder     metoprolol (TOPROL-XL) 25 MG 24 hr tablet Reorder         Encounter Diagnoses   Name Primary?     Cardiomyopathy, unspecified type (H)      Lightheadedness Yes       CURRENT MEDICATIONS:  Current Outpatient Prescriptions   Medication Sig Dispense Refill     metoprolol (TOPROL-XL) 25 MG 24 hr tablet Take 0.5 tablets (12.5 mg) by mouth daily       glipiZIDE (GLUCOTROL) 10 MG tablet Take 1 tablet (10 mg) by mouth 2 times daily (before meals) 180 tablet 3     HYDROcodone-acetaminophen (NORCO) 5-325 MG per tablet Take 1 tablet by mouth every 4 hours as needed for moderate to severe pain Give 1 tablet by mouth every 4 hours as needed for Pain 1-5/10 AND Give 2 tablet by mouth every 4 hours as needed for Pain 6-10/10       AMITRIPTYLINE HCL PO Take 25 mg by mouth nightly as needed for sleep       ipratropium (ATROVENT) 0.03 % spray Spray 2 sprays into both nostrils every 12 hours       DULoxetine (CYMBALTA) 30 MG EC capsule Take 1 capsule (30 mg) by mouth daily 90 capsule 3     omeprazole (PRILOSEC) 40 MG capsule Take 1 capsule (40 mg) by mouth daily Take 30-60 minutes before a meal. (Patient not taking: Reported on 10/26/2017) 90 capsule 3     tamsulosin (FLOMAX) 0.4 MG capsule Take 1 capsule (0.4 mg) by mouth daily 90 capsule 3     aspirin EC 81 MG EC tablet Take 1 tablet (81 mg) by mouth daily 30 tablet 0     METFORMIN HCL PO Take 1,000 mg by mouth 2 times daily (with meals)       ATORVASTATIN CALCIUM PO Take 80 mg by mouth daily        "blood glucose monitoring (NO BRAND SPECIFIED) test strip Use to test blood sugar three times daily or as directed. 300 each 3     order for DME Equipment being ordered: True Matrix Blood Glucose meter. 1 Device 0     Psyllium (METAMUCIL PO) Take 1 packet by mouth daily as needed        Clopidogrel Bisulfate, [id number], (PLAVIX PO) Take 75 mg by mouth daily        [DISCONTINUED] metoprolol (TOPROL-XL) 25 MG 24 hr tablet Take 1 tablet (25 mg) by mouth daily       [DISCONTINUED] metoprolol (TOPROL-XL) 25 MG 24 hr tablet Take 0.5 tablets (12.5 mg) by mouth daily (Patient taking differently: Take 25 mg by mouth daily ) 45 tablet 0       ALLERGIES     Allergies   Allergen Reactions     Oxycodone Other (See Comments)     \"TERRIBLE SWEATING\"     Sulfa Drugs      Tetracycline        PAST MEDICAL HISTORY:  Past Medical History:   Diagnosis Date     Aortic root dilatation (H)      Benign essential hypertension 1/6/2017     Benign non-nodular prostatic hyperplasia with lower urinary tract symptoms 10/20/2016     CAD (coronary artery disease)      Cardiomyopathy (H)      Carotid artery stenosis 10/20/2016     Carotid occlusion, left      Coronary artery disease involving native coronary artery of native heart without angina pectoris 10/20/2016     Diabetes mellitus (H)      DJD (degenerative joint disease)      Gastro-oesophageal reflux disease      Gastroesophageal reflux disease without esophagitis 10/20/2016     Heart attack      Hyperlipidemia      Hypertension      Mild cognitive impairment 10/20/2016     Nephrolithiasis     RECURRENT     Osteopenia      Paroxysmal atrial fibrillation (H)      PONV (postoperative nausea and vomiting)      Unspecified cerebral artery occlusion with cerebral infarction        PAST SURGICAL HISTORY:  Past Surgical History:   Procedure Laterality Date     AMPUTATE FOOT Left 6/4/2017    Procedure: AMPUTATE FOOT;  Sun Add#3: partial left foot amputation - Sagital Saw - Mini C-Arm;  Surgeon: " Moe Kirk DPM;  Location:  OR     CHOLECYSTECTOMY       ESOPHAGOSCOPY, GASTROSCOPY, DUODENOSCOPY (EGD), COMBINED N/A 12/26/2016    Procedure: COMBINED ESOPHAGOSCOPY, GASTROSCOPY, DUODENOSCOPY (EGD);  Surgeon: Nasim Louis MD;  Location:  GI     GENITOURINARY SURGERY      KIDNEY STONE REMOVAL X 4     HC UGI ENDOSCOPY W EUS N/A 12/29/2016    Procedure: COMBINED ENDOSCOPIC ULTRASOUND, ESOPHAGOSCOPY, GASTROSCOPY, DUODENOSCOPY (EGD);  Surgeon: Nasim Louis MD;  Location:  GI     HERNIA REPAIR       INCISION AND DRAINAGE FOOT, COMBINED Left 6/6/2017    Procedure: COMBINED INCISION AND DRAINAGE FOOT;  REVISIONAL LEFT FOOT INCISION AND DRAINAGE WITH POSSIBLE FLAP CLOSURE , ANTIBIOTIC SOLUTION ;  Surgeon: Nabil Ann DPM;  Location:  OR     LASER HOLMIUM LITHOTRIPSY URETER(S), INSERT STENT, COMBINED  3/2/2012    Procedure:COMBINED CYSTOSCOPY, URETEROSCOPY, LASER HOLMIUM LITHOTRIPSY URETER(S), INSERT STENT; CYSTOSCOPY, RIGHT RETROGRADES, RIGHT URETEROSCOPY, HOLMIUM LASER, RIGHT STENT PLACEMENT; Surgeon:ANJELICA LEÓN; Location: OR     ROTATOR CUFF REPAIR RT/LT         FAMILY HISTORY:  Family History   Problem Relation Age of Onset     Breast Cancer Mother      Heart Failure Father 81       SOCIAL HISTORY:  Social History     Social History     Marital status:      Spouse name: N/A     Number of children: N/A     Years of education: N/A     Social History Main Topics     Smoking status: Former Smoker     Packs/day: 1.00     Years: 30.00     Types: Cigarettes     Quit date: 1/1/1999     Smokeless tobacco: Never Used     Alcohol use 4.2 oz/week     7 Standard drinks or equivalent per week      Comment: 1 drink per week     Drug use: No     Sexual activity: Not Currently     Partners: Female     Other Topics Concern     None     Social History Narrative       Review of Systems:  Skin:        Eyes:       ENT:       Respiratory:       Cardiovascular:      "  Gastroenterology:      Genitourinary:       Musculoskeletal:       Neurologic:       Psychiatric:       Heme/Lymph/Imm:       Endocrine:         Physical Exam:  Vitals: /60  Pulse 60  Ht 1.651 m (5' 5\")  Wt 76.2 kg (168 lb)  BMI 27.96 kg/m2    Constitutional:           Skin:           Head:           Eyes:           ENT:           Neck:           Chest:           Cardiac:                    Abdomen:           Vascular:                                        Extremities and Back:           Neurological:             Recent Lab Results:  LIPID RESULTS:  Lab Results   Component Value Date    CHOL 165 01/21/2017    HDL 48 01/21/2017    LDL 87 01/21/2017    TRIG 148 01/21/2017    CHOLHDLRATIO 5.1 (H) 12/18/2015       LIVER ENZYME RESULTS:  Lab Results   Component Value Date    AST 43 12/27/2016    ALT 50 12/27/2016       CBC RESULTS:  Lab Results   Component Value Date    WBC 7.9 06/09/2017    RBC 3.53 (L) 06/09/2017    HGB 12.7 (L) 09/28/2017    HCT 32.8 (L) 06/09/2017    MCV 93 06/09/2017    MCH 30.6 06/09/2017    MCHC 32.9 06/09/2017    RDW 13.9 06/09/2017     06/09/2017       BMP RESULTS:  Lab Results   Component Value Date     06/13/2017    POTASSIUM 4.6 06/13/2017    CHLORIDE 107 06/13/2017    CO2 28 06/13/2017    ANIONGAP 7 06/13/2017     06/13/2017    BUN 20 06/13/2017    CR 0.68 (L) 06/13/2017    GFRESTIMATED >60 06/13/2017    GFRESTBLACK >60 06/13/2017    ALLISON 9.4 06/13/2017        A1C RESULTS:  Lab Results   Component Value Date    A1C 6.7 (H) 07/24/2017       INR RESULTS:  Lab Results   Component Value Date    INR 1.03 01/20/2017    INR 0.99 08/19/2009           CC  Matt Morrissey MD  [facility]  [address]  [place] [address]                  "

## 2017-10-26 NOTE — NURSING NOTE
"Chief Complaint   Patient presents with     Surgical Followup     partial Left 5th ray resection by Dr Kirk with revision and closure by myself 2 days later - DOS 6/4/2017 & 6/6/2017       Initial /54 (BP Location: Right arm, Patient Position: Sitting, Cuff Size: Adult Large)  Pulse 64  Ht 5' 5\" (1.651 m)  Wt 168 lb (76.2 kg)  BMI 27.96 kg/m2 Estimated body mass index is 27.96 kg/(m^2) as calculated from the following:    Height as of this encounter: 5' 5\" (1.651 m).    Weight as of this encounter: 168 lb (76.2 kg).  Medication Reconciliation: complete    "

## 2017-10-26 NOTE — Clinical Note
Anibal Morrissey  I saw Dustin today, 3 1/2 months sp partial L 5th ray amputation for infected wound.  He's fully healed, and will be having his orthotics modified today.  He'll follow up in 6 months or sooner with acute issues.  Thanks  Nabil

## 2017-11-03 ENCOUNTER — OFFICE VISIT (OUTPATIENT)
Dept: CARDIOLOGY | Facility: CLINIC | Age: 82
End: 2017-11-03
Attending: PHYSICIAN ASSISTANT
Payer: MEDICARE

## 2017-11-03 VITALS
HEART RATE: 72 BPM | BODY MASS INDEX: 28.16 KG/M2 | DIASTOLIC BLOOD PRESSURE: 60 MMHG | HEIGHT: 65 IN | SYSTOLIC BLOOD PRESSURE: 90 MMHG | WEIGHT: 169 LBS

## 2017-11-03 DIAGNOSIS — R42 LIGHTHEADEDNESS: ICD-10-CM

## 2017-11-03 DIAGNOSIS — I49.3 PVC'S (PREMATURE VENTRICULAR CONTRACTIONS): Primary | ICD-10-CM

## 2017-11-03 PROCEDURE — 99214 OFFICE O/P EST MOD 30 MIN: CPT | Performed by: PHYSICIAN ASSISTANT

## 2017-11-03 PROCEDURE — 93000 ELECTROCARDIOGRAM COMPLETE: CPT | Performed by: PHYSICIAN ASSISTANT

## 2017-11-03 NOTE — PROGRESS NOTES
"HPI:   I saw Dustin today when he came accompanied by his friend Halina for further evaluation of \"dizzy spells.\" I just saw him 10/26 and his history is well documented in that note. Briefly, he had a CVA 1/2017, has cerebrovascular disease, peripheral vascular disease, hypertension, dyslipidemia and diabetes. He has a cardiomyopathy which is felt likely ischemic. Stress test in 3/2017 showed just mild ezekiel- infarct ischemia and medical management has been recommended. Most recent ejection fraction 1/2017 was 35-40% with wall motion abnormalities.     When I last saw him, he complained of a six-month history of a significant lightheadedness and dizziness, especially with standing. This would occasionally fall with these, but had not injured himself.  He had never actually lost consciousness.  He was orthostatic on exam with blood pressures dropping from the 140s to the 120s. I recommended compression stockings, adequate hydration and reduction in his metoprolol to 12.5 mg daily. He and Halina were both very reticent to wearing an event monitor, though with his cardiomyopathy and continued symptoms, I felt this was quite needed. He is now back for follow-up.    Since I last saw him, he states that he is doing \"great.\" He denies any problems with lightheadedness, falls or feelings of unsteadiness. He feels the ankle pumps that he does before he stands up have helped immensely.    He is not taking any medications today, but I note blood pressure is quite a bit lower than it was at his last appointment.    He continues he needs to deny chest pain, palpitations or shortness of breath. He denies evidence of fluid overload.    Echo 1/2017 showed an EF of roughly 35-40% with severe inferolateral hypokinesis, mild aortic stenosis and mild dilatation of the aortic root  Carotid Doppler 1/2017 showed an occluded left internal carotid artery and a 50% lesion in the right  Stress test 3/2017 showed a medium sized perfusion defect " of severe intensity which was only mildly reversible. Given very mild amount of ezekiel-infarct ischemia and no symptoms, medical management was recommended by Dr. Samuel    EKG today showed SR @ 67 bpm with frequent PVCs. RBBB and LAFB noted.    Assessment & Plan:    1. Lightheadedness and Falls - BP was quite low today, but had been higher at previous visits.  This has improved immensely with ankle pumps. He has not had any lightheadedness, dizziness or falls since last week.   * Continue ankle pumps, compression stockings and adequate hydration.   * Blood pressure does not appear to be affecting him this morning, but will have him move his Toprol-XL 12.5 mg to evening.    2. HTN - BP quite low this AM.   * Currently he's only on Toprol XL 12.5 mg daily   * Will move to evening    3. H/o Cerebrovascular Disease and Peripheral Vascular Disease -   * Continue to follow with Dr. Rodriguez and DERIC Monsalve    4. CM - EF on echo 1/2017 was 35-40%. This is likely ischemic given his stress test results and risk factors.  Since he's been asymptomatic, without CP, medical management has been recommended.  EKG today showed frequent PVCs.  I am suspicious for ventricular arrhythmias given his frequent PVCs and cardiomyopathy   * Get 24-hour Holter monitor.    * I will call him with the results and he will more fully go over the results when he sees Dr. Samuel 12/14.      He was instructed to call if he has any issues or questions in the interim.      Sruthi Padgett PA-C, MSPAS      Orders Placed This Encounter   Procedures     Holter Monitor 24 hour - Adult     No orders of the defined types were placed in this encounter.    There are no discontinued medications.      Encounter Diagnoses   Name Primary?     Lightheadedness      PVC's (premature ventricular contractions) Yes       CURRENT MEDICATIONS:  Current Outpatient Prescriptions   Medication Sig Dispense Refill     metoprolol (TOPROL-XL) 25 MG 24 hr tablet Take 0.5 tablets (12.5  "mg) by mouth daily       glipiZIDE (GLUCOTROL) 10 MG tablet Take 1 tablet (10 mg) by mouth 2 times daily (before meals) 180 tablet 3     HYDROcodone-acetaminophen (NORCO) 5-325 MG per tablet Take 1 tablet by mouth every 4 hours as needed for moderate to severe pain Give 1 tablet by mouth every 4 hours as needed for Pain 1-5/10 AND Give 2 tablet by mouth every 4 hours as needed for Pain 6-10/10       AMITRIPTYLINE HCL PO Take 25 mg by mouth nightly as needed for sleep       ipratropium (ATROVENT) 0.03 % spray Spray 2 sprays into both nostrils every 12 hours       DULoxetine (CYMBALTA) 30 MG EC capsule Take 1 capsule (30 mg) by mouth daily 90 capsule 3     omeprazole (PRILOSEC) 40 MG capsule Take 1 capsule (40 mg) by mouth daily Take 30-60 minutes before a meal. 90 capsule 3     tamsulosin (FLOMAX) 0.4 MG capsule Take 1 capsule (0.4 mg) by mouth daily 90 capsule 3     aspirin EC 81 MG EC tablet Take 1 tablet (81 mg) by mouth daily 30 tablet 0     METFORMIN HCL PO Take 1,000 mg by mouth 2 times daily (with meals)       ATORVASTATIN CALCIUM PO Take 80 mg by mouth daily       blood glucose monitoring (NO BRAND SPECIFIED) test strip Use to test blood sugar three times daily or as directed. 300 each 3     order for DME Equipment being ordered: True Matrix Blood Glucose meter. 1 Device 0     Psyllium (METAMUCIL PO) Take 1 packet by mouth daily as needed        Clopidogrel Bisulfate, [id number], (PLAVIX PO) Take 75 mg by mouth daily          ALLERGIES     Allergies   Allergen Reactions     Oxycodone Other (See Comments)     \"TERRIBLE SWEATING\"     Sulfa Drugs      Tetracycline        PAST MEDICAL HISTORY:  Past Medical History:   Diagnosis Date     Aortic root dilatation (H)      Benign essential hypertension 1/6/2017     Benign non-nodular prostatic hyperplasia with lower urinary tract symptoms 10/20/2016     CAD (coronary artery disease)      Cardiomyopathy (H)      Carotid artery stenosis 10/20/2016     Carotid occlusion, " left      Coronary artery disease involving native coronary artery of native heart without angina pectoris 10/20/2016     Diabetes mellitus (H)      DJD (degenerative joint disease)      Gastro-oesophageal reflux disease      Gastroesophageal reflux disease without esophagitis 10/20/2016     Heart attack      Hyperlipidemia      Hypertension      Mild cognitive impairment 10/20/2016     Nephrolithiasis     RECURRENT     Osteopenia      Paroxysmal atrial fibrillation (H)      PONV (postoperative nausea and vomiting)      Unspecified cerebral artery occlusion with cerebral infarction        PAST SURGICAL HISTORY:  Past Surgical History:   Procedure Laterality Date     AMPUTATE FOOT Left 6/4/2017    Procedure: AMPUTATE FOOT;  Sun Add#3: partial left foot amputation - Sagital Saw - Mini C-Arm;  Surgeon: Moe Kirk DPM;  Location:  OR     CHOLECYSTECTOMY       ESOPHAGOSCOPY, GASTROSCOPY, DUODENOSCOPY (EGD), COMBINED N/A 12/26/2016    Procedure: COMBINED ESOPHAGOSCOPY, GASTROSCOPY, DUODENOSCOPY (EGD);  Surgeon: Nasim Louis MD;  Location:  GI     GENITOURINARY SURGERY      KIDNEY STONE REMOVAL X 4     HC UGI ENDOSCOPY W EUS N/A 12/29/2016    Procedure: COMBINED ENDOSCOPIC ULTRASOUND, ESOPHAGOSCOPY, GASTROSCOPY, DUODENOSCOPY (EGD);  Surgeon: Nasim Louis MD;  Location:  GI     HERNIA REPAIR       INCISION AND DRAINAGE FOOT, COMBINED Left 6/6/2017    Procedure: COMBINED INCISION AND DRAINAGE FOOT;  REVISIONAL LEFT FOOT INCISION AND DRAINAGE WITH POSSIBLE FLAP CLOSURE , ANTIBIOTIC SOLUTION ;  Surgeon: Nabil Ann DPM;  Location:  OR     LASER HOLMIUM LITHOTRIPSY URETER(S), INSERT STENT, COMBINED  3/2/2012    Procedure:COMBINED CYSTOSCOPY, URETEROSCOPY, LASER HOLMIUM LITHOTRIPSY URETER(S), INSERT STENT; CYSTOSCOPY, RIGHT RETROGRADES, RIGHT URETEROSCOPY, HOLMIUM LASER, RIGHT STENT PLACEMENT; Surgeon:ANJELICA LEÓN; Location: OR     ROTATOR CUFF REPAIR RT/LT         FAMILY  "HISTORY:  Family History   Problem Relation Age of Onset     Breast Cancer Mother      Heart Failure Father 81       SOCIAL HISTORY:  Social History     Social History     Marital status:      Spouse name: N/A     Number of children: N/A     Years of education: N/A     Social History Main Topics     Smoking status: Former Smoker     Packs/day: 1.00     Years: 30.00     Types: Cigarettes     Quit date: 1/1/1999     Smokeless tobacco: Never Used     Alcohol use 4.2 oz/week     7 Standard drinks or equivalent per week      Comment: 1 drink per week     Drug use: No     Sexual activity: Not Currently     Partners: Female     Other Topics Concern     None     Social History Narrative       Review of Systems:  Skin:  Positive for     Eyes:  Positive for glasses  ENT:  Positive for hearing loss  Respiratory:  Negative for shortness of breath;dyspnea on exertion;cough  Cardiovascular:  Negative for;palpitations;chest pain;edema;dizziness;lightheadedness    Gastroenterology: Negative    Genitourinary:  not assessed    Musculoskeletal:  Positive for arthritis;nocturnal cramping  Neurologic:  Positive for (occasional) headaches  Psychiatric:  Negative    Heme/Lymph/Imm:  Negative    Endocrine:  Positive for diabetes    Physical Exam:  Vitals: BP 90/60  Pulse 72  Ht 1.651 m (5' 5\")  Wt 76.7 kg (169 lb)  BMI 28.12 kg/m2    Constitutional:  cooperative, alert and oriented, well developed, well nourished, in no acute distress        Skin:  warm and dry to the touch, no apparent skin lesions or masses noted        Head:  normocephalic, no masses or lesions        Eyes:  pupils equal and round;conjunctivae and lids unremarkable;sclera white        ENT:  no pallor or cyanosis, dentition good        Neck:  JVP normal   No L carotid pulse    Chest:  normal breath sounds, clear to auscultation, normal A-P diameter, normal symmetry, normal respiratory excursion, no use of accessory muscles        Cardiac: regular rhythm       "            Abdomen:  abdomen soft        Vascular:   pulses below the femoral arteries are diminished                                    Extremities and Back:  no edema;no deformities, clubbing, cyanosis, erythema observed        Neurological:  no gross motor deficits          Recent Lab Results:  LIPID RESULTS:  Lab Results   Component Value Date    CHOL 165 01/21/2017    HDL 48 01/21/2017    LDL 87 01/21/2017    TRIG 148 01/21/2017    CHOLHDLRATIO 5.1 (H) 12/18/2015       LIVER ENZYME RESULTS:  Lab Results   Component Value Date    AST 43 12/27/2016    ALT 50 12/27/2016       CBC RESULTS:  Lab Results   Component Value Date    WBC 7.9 06/09/2017    RBC 3.53 (L) 06/09/2017    HGB 12.7 (L) 09/28/2017    HCT 32.8 (L) 06/09/2017    MCV 93 06/09/2017    MCH 30.6 06/09/2017    MCHC 32.9 06/09/2017    RDW 13.9 06/09/2017     06/09/2017       BMP RESULTS:  Lab Results   Component Value Date     06/13/2017    POTASSIUM 4.6 06/13/2017    CHLORIDE 107 06/13/2017    CO2 28 06/13/2017    ANIONGAP 7 06/13/2017     06/13/2017    BUN 20 06/13/2017    CR 0.68 (L) 06/13/2017    GFRESTIMATED >60 06/13/2017    GFRESTBLACK >60 06/13/2017    ALLISON 9.4 06/13/2017        A1C RESULTS:  Lab Results   Component Value Date    A1C 6.7 (H) 07/24/2017       INR RESULTS:  Lab Results   Component Value Date    INR 1.03 01/20/2017    INR 0.99 08/19/2009

## 2017-11-03 NOTE — MR AVS SNAPSHOT
After Visit Summary   11/3/2017    Dustin Pearce    MRN: [id number]           Patient Information     Date Of Birth          1/31/1928        Visit Information        Provider Department      11/3/2017 7:30 AM Chrissy Padgett PA-C [facility]        Today's Diagnoses     PVC's (premature ventricular contractions)    -  1    Lightheadedness          Care Instructions    1. So glad you're feeling better!  Continue stockings, reduced metoprolol 12.5 mg daily and ankle pumps!    2. As we discussed, EKG today showed lots of PVCs (premature ventricular contractions).  With your weakened heart muscle and dizzy spells, I am suspicious that you might have a lot of PVCs in 24 hours, which could cause problems with weakening heart muscle.  We will get a 24 hour HOLTER MONITOR      3. Try moving the metoprolol XL 12.5 mg (1/2 of the 25 mg tab) to evening to see if that makes any difference.     4. My nurses are Anni/Laura/Rommel: [phone]          Follow-ups after your visit        Your next 10 appointments already scheduled     Dec 14, 2017  9:45 AM CST   Return Visit with Rafa Samuel MD   [facility] ([facility])    [address] [place] [id number] [phone]              Future tests that were ordered for you today     Open Future Orders        Priority Expected Expires Ordered    Holter Monitor 24 hour - Adult Routine 11/10/2017 11/3/2018 11/3/2017            Who to contact     If you have questions or need follow up information about today's clinic visit or your schedule please contact [facility] directly at [phone].  Normal or non-critical lab and imaging results will be communicated to you by MyChart, letter or phone within 4 business days after the clinic has received the results. If you do not hear from us within 7 days, please contact the  "clinic through [employer] or phone. If you have a critical or abnormal lab result, we will notify you by phone as soon as possible.  Submit refill requests through [employer] or call your pharmacy and they will forward the refill request to us. Please allow 3 business days for your refill to be completed.          Additional Information About Your Visit        Care EveryWhere ID     This is your Care EveryWhere ID. This could be used by other organizations to access your [facility] medical records  [id number]        Your Vitals Were     Pulse Height BMI (Body Mass Index)             72 1.651 m (5' 5\") 28.12 kg/m2          Blood Pressure from Last 3 Encounters:   11/03/17 90/60   10/26/17 129/54   10/26/17 110/60    Weight from Last 3 Encounters:   11/03/17 76.7 kg (169 lb)   10/26/17 76.2 kg (168 lb)   10/26/17 76.2 kg (168 lb)              We Performed the Following     EKG 12-lead complete w/read - Clinics (to be scheduled)     Follow-Up with Cardiac Advanced Practice Provider        Primary Care Provider Office Phone # Fax #    Matt Morrissey [phone]       [facility] [address]  [facility] [address]        Equal Access to Services     LAURENCE JACOBSON : Hadii aad ku hadasho Soomaali, waaxda luqadaha, qaybta kaalmada adeegyada, waxay jose alejandroin zack gama . So [facility] [phone].    ATENCIÓN: Si habla español, tiene a garvey disposición servicios gratuitos de asistencia lingüística. Llame al [phone].    We comply with applicable federal civil rights laws and [place] laws. We do not discriminate on the basis of race, color, national origin, age, disability, sex, sexual orientation, or gender identity.            Thank you!     Thank you for choosing [facility]  for your care. Our goal is always to provide you with excellent care. Hearing back from our patients is one way we can continue to improve our services. Please take a few " minutes to complete the written survey that you may receive in the mail after your visit with us. Thank you!             Your Updated Medication List - Protect others around you: Learn how to safely use, store and throw away your medicines at www.disposemymeds.org.          This list is accurate as of: 11/3/17  8:00 AM.  Always use your most recent med list.                   Brand Name Dispense Instructions for use Diagnosis    AMITRIPTYLINE HCL PO      Take 25 mg by mouth nightly as needed for sleep        aspirin 81 MG EC tablet     30 tablet    Take 1 tablet (81 mg) by mouth daily    Transient cerebral ischemia, unspecified type       ATORVASTATIN CALCIUM PO      Take 80 mg by mouth daily        blood glucose monitoring test strip    no brand specified    300 each    Use to test blood sugar three times daily or as directed.    Hypoglycemia       DULoxetine 30 MG EC capsule    CYMBALTA    90 capsule    Take 1 capsule (30 mg) by mouth daily    Polyneuropathy associated with underlying disease (H)       glipiZIDE 10 MG tablet    GLUCOTROL    180 tablet    Take 1 tablet (10 mg) by mouth 2 times daily (before meals)    Type 2 diabetes mellitus with diabetic peripheral angiopathy without gangrene, without long-term current use of insulin (H)       HYDROcodone-acetaminophen 5-325 MG per tablet    NORCO     Take 1 tablet by mouth every 4 hours as needed for moderate to severe pain Give 1 tablet by mouth every 4 hours as needed for Pain 1-5/10 AND Give 2 tablet by mouth every 4 hours as needed for Pain 6-10/10        ipratropium 0.03 % spray    ATROVENT     Spray 2 sprays into both nostrils every 12 hours        METAMUCIL PO      Take 1 packet by mouth daily as needed        METFORMIN HCL PO      Take 1,000 mg by mouth 2 times daily (with meals)        metoprolol 25 MG 24 hr tablet    TOPROL-XL     Take 0.5 tablets (12.5 mg) by mouth daily    Cardiomyopathy, unspecified type (H)       omeprazole 40 MG capsule    priLOSEC     90 capsule    Take 1 capsule (40 mg) by mouth daily Take 30-60 minutes before a meal.    Other acute gastritis without hemorrhage       order for DME     1 Device    Equipment being ordered: True Matrix Blood Glucose meter.    Type 2 diabetes mellitus without complication (H)       PLAVIX PO      Take 75 mg by mouth daily    Cough       tamsulosin 0.4 MG capsule    FLOMAX    90 capsule    Take 1 capsule (0.4 mg) by mouth daily    Benign non-nodular prostatic hyperplasia with lower urinary tract symptoms

## 2017-11-03 NOTE — LETTER
"11/3/2017    Matt Morrissey MD  [facility]   [address]  [facility] [address]    RE: Dustin ROBERTS [place]       Dear Colleague,    I had the pleasure of seeing Dustin Pearce in the [facility].    HPI:   I saw Dustin today when he came accompanied by his friend Halina for further evaluation of \"dizzy spells.\" I just saw him 10/26 and his history is well documented in that note. Briefly, he had a CVA 1/2017, has cerebrovascular disease, peripheral vascular disease, hypertension, dyslipidemia and diabetes. He has a cardiomyopathy which is felt likely ischemic. Stress test in 3/2017 showed just mild ezekiel- infarct ischemia and medical management has been recommended. Most recent ejection fraction 1/2017 was 35-40% with wall motion abnormalities.     When I last saw him, he complained of a six-month history of a significant lightheadedness and dizziness, especially with standing. This would occasionally fall with these, but had not injured himself.  He had never actually lost consciousness.  He was orthostatic on exam with blood pressures dropping from the 140s to the 120s. I recommended compression stockings, adequate hydration and reduction in his metoprolol to 12.5 mg daily. He and Halina were both very reticent to wearing an event monitor, though with his cardiomyopathy and continued symptoms, I felt this was quite needed. He is now back for follow-up.    Since I last saw him, he states that he is doing \"great.\" He denies any problems with lightheadedness, falls or feelings of unsteadiness. He feels the ankle pumps that he does before he stands up have helped immensely.    He is not taking any medications today, but I note blood pressure is quite a bit lower than it was at his last appointment.    He continues he needs to deny chest pain, palpitations or shortness of breath. He denies evidence of fluid overload.    Echo 1/2017 showed an EF of roughly 35-40% with severe " inferolateral hypokinesis, mild aortic stenosis and mild dilatation of the aortic root  Carotid Doppler 1/2017 showed an occluded left internal carotid artery and a 50% lesion in the right  Stress test 3/2017 showed a medium sized perfusion defect of severe intensity which was only mildly reversible. Given very mild amount of ezekiel-infarct ischemia and no symptoms, medical management was recommended by Dr. Samuel    EKG today showed SR @ 67 bpm with frequent PVCs. RBBB and LAFB noted.    Assessment & Plan:    1. Lightheadedness and Falls - BP was quite low today, but had been higher at previous visits.  This has improved immensely with ankle pumps. He has not had any lightheadedness, dizziness or falls since last week.   * Continue ankle pumps, compression stockings and adequate hydration.   * Blood pressure does not appear to be affecting him this morning, but will have him move his Toprol-XL 12.5 mg to evening.    2. HTN - BP quite low this AM.   * Currently he's only on Toprol XL 12.5 mg daily   * Will move to evening    3. H/o Cerebrovascular Disease and Peripheral Vascular Disease -   * Continue to follow with Dr. Rodriguez and DERIC Monsalve    4. CM - EF on echo 1/2017 was 35-40%. This is likely ischemic given his stress test results and risk factors.  Since he's been asymptomatic, without CP, medical management has been recommended.  EKG today showed frequent PVCs.  I am suspicious for ventricular arrhythmias given his frequent PVCs and cardiomyopathy   * Get 24-hour Holter monitor.    * I will call him with the results and he will more fully go over the results when he sees Dr. Samuel 12/14.      He was instructed to call if he has any issues or questions in the interim.      Sruthi Padgett PA-C, [facility]      Orders Placed This Encounter   Procedures     Holter Monitor 24 hour - Adult     No orders of the defined types were placed in this encounter.    There are no discontinued medications.      Encounter Diagnoses  "  Name Primary?     Lightheadedness      PVC's (premature ventricular contractions) Yes       CURRENT MEDICATIONS:  Current Outpatient Prescriptions   Medication Sig Dispense Refill     metoprolol (TOPROL-XL) 25 MG 24 hr tablet Take 0.5 tablets (12.5 mg) by mouth daily       glipiZIDE (GLUCOTROL) 10 MG tablet Take 1 tablet (10 mg) by mouth 2 times daily (before meals) 180 tablet 3     HYDROcodone-acetaminophen (NORCO) 5-325 MG per tablet Take 1 tablet by mouth every 4 hours as needed for moderate to severe pain Give 1 tablet by mouth every 4 hours as needed for Pain 1-5/10 AND Give 2 tablet by mouth every 4 hours as needed for Pain 6-10/10       AMITRIPTYLINE HCL PO Take 25 mg by mouth nightly as needed for sleep       ipratropium (ATROVENT) 0.03 % spray Spray 2 sprays into both nostrils every 12 hours       DULoxetine (CYMBALTA) 30 MG EC capsule Take 1 capsule (30 mg) by mouth daily 90 capsule 3     omeprazole (PRILOSEC) 40 MG capsule Take 1 capsule (40 mg) by mouth daily Take 30-60 minutes before a meal. 90 capsule 3     tamsulosin (FLOMAX) 0.4 MG capsule Take 1 capsule (0.4 mg) by mouth daily 90 capsule 3     aspirin EC 81 MG EC tablet Take 1 tablet (81 mg) by mouth daily 30 tablet 0     METFORMIN HCL PO Take 1,000 mg by mouth 2 times daily (with meals)       ATORVASTATIN CALCIUM PO Take 80 mg by mouth daily       blood glucose monitoring (NO BRAND SPECIFIED) test strip Use to test blood sugar three times daily or as directed. 300 each 3     order for DME Equipment being ordered: True Matrix Blood Glucose meter. 1 Device 0     Psyllium (METAMUCIL PO) Take 1 packet by mouth daily as needed        Clopidogrel Bisulfate, [id number], (PLAVIX PO) Take 75 mg by mouth daily          ALLERGIES     Allergies   Allergen Reactions     Oxycodone Other (See Comments)     \"TERRIBLE SWEATING\"     Sulfa Drugs      Tetracycline        PAST MEDICAL HISTORY:  Past Medical History:   Diagnosis Date     Aortic root dilatation (H)  "     Benign essential hypertension 1/6/2017     Benign non-nodular prostatic hyperplasia with lower urinary tract symptoms 10/20/2016     CAD (coronary artery disease)      Cardiomyopathy (H)      Carotid artery stenosis 10/20/2016     Carotid occlusion, left      Coronary artery disease involving native coronary artery of native heart without angina pectoris 10/20/2016     Diabetes mellitus (H)      DJD (degenerative joint disease)      Gastro-oesophageal reflux disease      Gastroesophageal reflux disease without esophagitis 10/20/2016     Heart attack      Hyperlipidemia      Hypertension      Mild cognitive impairment 10/20/2016     Nephrolithiasis     RECURRENT     Osteopenia      Paroxysmal atrial fibrillation (H)      PONV (postoperative nausea and vomiting)      Unspecified cerebral artery occlusion with cerebral infarction        PAST SURGICAL HISTORY:  Past Surgical History:   Procedure Laterality Date     AMPUTATE FOOT Left 6/4/2017    Procedure: AMPUTATE FOOT;  Sun Add#3: partial left foot amputation - Sagital Saw - Mini C-Arm;  Surgeon: Moe Kirk DPM;  Location:  OR     CHOLECYSTECTOMY       ESOPHAGOSCOPY, GASTROSCOPY, DUODENOSCOPY (EGD), COMBINED N/A 12/26/2016    Procedure: COMBINED ESOPHAGOSCOPY, GASTROSCOPY, DUODENOSCOPY (EGD);  Surgeon: Nasim Louis MD;  Location:  GI     GENITOURINARY SURGERY      KIDNEY STONE REMOVAL X 4     HC UGI ENDOSCOPY W EUS N/A 12/29/2016    Procedure: COMBINED ENDOSCOPIC ULTRASOUND, ESOPHAGOSCOPY, GASTROSCOPY, DUODENOSCOPY (EGD);  Surgeon: Nasim Louis MD;  Location:  GI     HERNIA REPAIR       INCISION AND DRAINAGE FOOT, COMBINED Left 6/6/2017    Procedure: COMBINED INCISION AND DRAINAGE FOOT;  REVISIONAL LEFT FOOT INCISION AND DRAINAGE WITH POSSIBLE FLAP CLOSURE , ANTIBIOTIC SOLUTION ;  Surgeon: Nabil Ann DPM;  Location:  OR     LASER HOLMIUM LITHOTRIPSY URETER(S), INSERT STENT, COMBINED  3/2/2012    Procedure:COMBINED  "CYSTOSCOPY, URETEROSCOPY, LASER HOLMIUM LITHOTRIPSY URETER(S), INSERT STENT; CYSTOSCOPY, RIGHT RETROGRADES, RIGHT URETEROSCOPY, HOLMIUM LASER, RIGHT STENT PLACEMENT; Surgeon:ANJELICA LEÓN; Location:[facility] OR     ROTATOR CUFF REPAIR RT/LT         FAMILY HISTORY:  Family History   Problem Relation Age of Onset     Breast Cancer Mother      Heart Failure Father 81       SOCIAL HISTORY:  Social History     Social History     Marital status:      Spouse name: N/A     Number of children: N/A     Years of education: N/A     Social History Main Topics     Smoking status: Former Smoker     Packs/day: 1.00     Years: 30.00     Types: Cigarettes     Quit date: 1/1/1999     Smokeless tobacco: Never Used     Alcohol use 4.2 oz/week     7 Standard drinks or equivalent per week      Comment: 1 drink per week     Drug use: No     Sexual activity: Not Currently     Partners: Female     Other Topics Concern     None     Social History Narrative       Review of Systems:  Skin:  Positive for     Eyes:  Positive for glasses  ENT:  Positive for hearing loss  Respiratory:  Negative for shortness of breath;dyspnea on exertion;cough  Cardiovascular:  Negative for;palpitations;chest pain;edema;dizziness;lightheadedness    Gastroenterology: Negative    Genitourinary:  not assessed    Musculoskeletal:  Positive for arthritis;nocturnal cramping  Neurologic:  Positive for (occasional) headaches  Psychiatric:  Negative    Heme/Lymph/Imm:  Negative    Endocrine:  Positive for diabetes    Physical Exam:  Vitals: BP 90/60  Pulse 72  Ht 1.651 m (5' 5\")  Wt 76.7 kg (169 lb)  BMI 28.12 kg/m2    Constitutional:  cooperative, alert and oriented, well developed, well nourished, in no acute distress        Skin:  warm and dry to the touch, no apparent skin lesions or masses noted        Head:  normocephalic, no masses or lesions        Eyes:  pupils equal and round;conjunctivae and lids unremarkable;sclera white        ENT:  no pallor or " cyanosis, dentition good        Neck:  JVP normal   No L carotid pulse    Chest:  normal breath sounds, clear to auscultation, normal A-P diameter, normal symmetry, normal respiratory excursion, no use of accessory muscles        Cardiac: regular rhythm                  Abdomen:  abdomen soft        Vascular:   pulses below the femoral arteries are diminished                                    Extremities and Back:  no edema;no deformities, clubbing, cyanosis, erythema observed        Neurological:  no gross motor deficits          Recent Lab Results:  LIPID RESULTS:  Lab Results   Component Value Date    CHOL 165 01/21/2017    HDL 48 01/21/2017    LDL 87 01/21/2017    TRIG 148 01/21/2017    CHOLHDLRATIO 5.1 (H) 12/18/2015       LIVER ENZYME RESULTS:  Lab Results   Component Value Date    AST 43 12/27/2016    ALT 50 12/27/2016       CBC RESULTS:  Lab Results   Component Value Date    WBC 7.9 06/09/2017    RBC 3.53 (L) 06/09/2017    HGB 12.7 (L) 09/28/2017    HCT 32.8 (L) 06/09/2017    MCV 93 06/09/2017    MCH 30.6 06/09/2017    MCHC 32.9 06/09/2017    RDW 13.9 06/09/2017     06/09/2017       BMP RESULTS:  Lab Results   Component Value Date     06/13/2017    POTASSIUM 4.6 06/13/2017    CHLORIDE 107 06/13/2017    CO2 28 06/13/2017    ANIONGAP 7 06/13/2017     06/13/2017    BUN 20 06/13/2017    CR 0.68 (L) 06/13/2017    GFRESTIMATED >60 06/13/2017    GFRESTBLACK >60 06/13/2017    ALLISON 9.4 06/13/2017      A1C RESULTS:  Lab Results   Component Value Date    A1C 6.7 (H) 07/24/2017       INR RESULTS:  Lab Results   Component Value Date    INR 1.03 01/20/2017    INR 0.99 08/19/2009       Thank you for allowing me to participate in the care of your patient.    Sincerely,     Chrissy Padgett PA-C     [facility]

## 2017-11-09 ENCOUNTER — HOSPITAL ENCOUNTER (OUTPATIENT)
Dept: CARDIOLOGY | Facility: CLINIC | Age: 82
Discharge: HOME | End: 2017-11-09
Attending: PHYSICIAN ASSISTANT | Admitting: PHYSICIAN ASSISTANT
Payer: MEDICARE

## 2017-11-09 DIAGNOSIS — R42 LIGHTHEADEDNESS: ICD-10-CM

## 2017-11-09 DIAGNOSIS — I49.3 PVC'S (PREMATURE VENTRICULAR CONTRACTIONS): ICD-10-CM

## 2017-11-09 PROCEDURE — 93225 XTRNL ECG REC<48 HRS REC: CPT

## 2017-11-09 PROCEDURE — 93227 XTRNL ECG REC<48 HR R&I: CPT | Performed by: INTERNAL MEDICINE

## 2017-11-16 ENCOUNTER — DOCUMENTATION ONLY (OUTPATIENT)
Dept: CARDIOLOGY | Facility: CLINIC | Age: 82
End: 2017-11-16

## 2017-11-16 NOTE — PROGRESS NOTES
Dr. Samuel - this is just an FYI as you will be seeing him in December.    RNs - Pls let pt know that Holter Monitor worn for frequent PVCs noted on EKG, cardiomyopathy on echocardiogram and episodes of dizziness (which improved with med changes and ankle pumps) looked good!    Normal heart rhythm. PVCs were present but not a great burden.    No changes before his appt with Dr. Samuel in December unless he has any concerns.       Sruthi

## 2017-11-17 NOTE — PROGRESS NOTES
Phone call to patient to inform him that Sruthi reviewed his holter and it shows a normal rhythm with very few PVC's. Her advice is to stay the course and that he will return to see Dr. Samuel on 12/14. Babak

## 2017-12-01 ENCOUNTER — OFFICE VISIT (OUTPATIENT)
Dept: FAMILY MEDICINE | Facility: CLINIC | Age: 82
End: 2017-12-01
Payer: MEDICARE

## 2017-12-01 VITALS
HEART RATE: 92 BPM | WEIGHT: 165.4 LBS | BODY MASS INDEX: 27.56 KG/M2 | HEIGHT: 65 IN | DIASTOLIC BLOOD PRESSURE: 70 MMHG | SYSTOLIC BLOOD PRESSURE: 110 MMHG | TEMPERATURE: 97.2 F | OXYGEN SATURATION: 95 %

## 2017-12-01 DIAGNOSIS — E78.5 HYPERLIPIDEMIA LDL GOAL <70: ICD-10-CM

## 2017-12-01 DIAGNOSIS — I10 BENIGN ESSENTIAL HYPERTENSION: ICD-10-CM

## 2017-12-01 DIAGNOSIS — G31.84 MILD COGNITIVE IMPAIRMENT: ICD-10-CM

## 2017-12-01 DIAGNOSIS — I73.9 PAD (PERIPHERAL ARTERY DISEASE) (H): ICD-10-CM

## 2017-12-01 DIAGNOSIS — I25.10 CORONARY ARTERY DISEASE INVOLVING NATIVE CORONARY ARTERY OF NATIVE HEART WITHOUT ANGINA PECTORIS: ICD-10-CM

## 2017-12-01 DIAGNOSIS — E11.42 DM TYPE 2 WITH DIABETIC PERIPHERAL NEUROPATHY (H): Primary | ICD-10-CM

## 2017-12-01 DIAGNOSIS — H61.21 IMPACTED CERUMEN OF RIGHT EAR: ICD-10-CM

## 2017-12-01 LAB
ANION GAP SERPL CALCULATED.3IONS-SCNC: 5 MMOL/L (ref 3–14)
BUN SERPL-MCNC: 17 MG/DL (ref 7–30)
CALCIUM SERPL-MCNC: 10.6 MG/DL (ref 8.5–10.1)
CHLORIDE SERPL-SCNC: 104 MMOL/L (ref 94–109)
CHOLEST SERPL-MCNC: 142 MG/DL
CO2 SERPL-SCNC: 30 MMOL/L (ref 20–32)
CREAT SERPL-MCNC: 0.83 MG/DL (ref 0.66–1.25)
CREAT UR-MCNC: 149 MG/DL
GFR SERPL CREATININE-BSD FRML MDRD: 87 ML/MIN/1.7M2
GLUCOSE SERPL-MCNC: 256 MG/DL (ref 70–99)
HBA1C MFR BLD: 8.7 % (ref 4.3–6)
HDLC SERPL-MCNC: 66 MG/DL
LDLC SERPL CALC-MCNC: 39 MG/DL
MICROALBUMIN UR-MCNC: 122 MG/L
MICROALBUMIN/CREAT UR: 81.88 MG/G CR (ref 0–17)
NONHDLC SERPL-MCNC: 76 MG/DL
POTASSIUM SERPL-SCNC: 5.9 MMOL/L (ref 3.4–5.3)
SODIUM SERPL-SCNC: 139 MMOL/L (ref 133–144)
TRIGL SERPL-MCNC: 186 MG/DL

## 2017-12-01 PROCEDURE — 99207 C FOOT EXAM  NO CHARGE: CPT | Performed by: INTERNAL MEDICINE

## 2017-12-01 PROCEDURE — 36415 COLL VENOUS BLD VENIPUNCTURE: CPT | Performed by: PATHOLOGY

## 2017-12-01 PROCEDURE — 82043 UR ALBUMIN QUANTITATIVE: CPT | Performed by: PATHOLOGY

## 2017-12-01 PROCEDURE — 99214 OFFICE O/P EST MOD 30 MIN: CPT | Performed by: INTERNAL MEDICINE

## 2017-12-01 PROCEDURE — 80061 LIPID PANEL: CPT | Performed by: PATHOLOGY

## 2017-12-01 PROCEDURE — 80048 BASIC METABOLIC PNL TOTAL CA: CPT | Performed by: PATHOLOGY

## 2017-12-01 PROCEDURE — 83036 HEMOGLOBIN GLYCOSYLATED A1C: CPT | Performed by: INTERNAL MEDICINE

## 2017-12-01 NOTE — MR AVS SNAPSHOT
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)    -  1    Impacted cerumen of right ear        Hyperlipidemia LDL goal <70        PAD (peripheral artery disease) (H)        Benign essential hypertension        Coronary artery disease involving native coronary artery of native heart without angina pectoris        Mild cognitive impairment          Care Instructions    Rather than sticking your finger in your ear when it itches    Use over the counter 1% hydrocortisone anti-itch cream     Place a small amount of 1% hydrocortisone cream on the tip of a Q-tip    Rub the cream gently on the OUTSIDE of your ear up to twice daily    DO NOT STICK THE Q-TIP IN YOUR EAR CANAL, THAT ONLY MAKES ITCHING AND OTHER MATTERS WORSE    The medicine will absorb into your ear and take the itchiness away             Follow-ups after your visit        Follow-up notes from your care team     Return in about 6 months (around 6/1/2018).      Your next 10 appointments already scheduled     Dec 14, 2017  9:45 AM CST   Return Visit with Rafa Samuel MD   [facility] ([facility])    [address] [place] [id number] [phone]              Who to contact     If you have questions or need follow up information about today's clinic visit or your schedule please contact [facility] directly at [phone].  Normal or non-critical lab and imaging results will be communicated to you by MyChart, letter or phone within 4 business days after the clinic has received the results. If you do not hear from us within 7 days, please contact the clinic through MyChart or phone. If you  "have a critical or abnormal lab result, we will notify you by phone as soon as possible.  Submit refill requests through [employer] or call your pharmacy and they will forward the refill request to us. Please allow 3 business days for your refill to be completed.          Additional Information About Your Visit        Care EveryWhere ID     This is your Care EveryWhere ID. This could be used by other organizations to access your [place] medical records  [id number]        Your Vitals Were     Pulse Temperature Height Pulse Oximetry BMI (Body Mass Index)       92 97.2  F (36.2  C) (Oral) 5' 5\" (1.651 m) 95% 27.52 kg/m2        Blood Pressure from Last 3 Encounters:   12/01/17 110/70   11/03/17 90/60   10/26/17 129/54    Weight from Last 3 Encounters:   12/01/17 165 lb 6.4 oz (75 kg)   11/03/17 169 lb (76.7 kg)   10/26/17 168 lb (76.2 kg)              We Performed the Following     Albumin Random Urine Quantitative with Creat Ratio     BASIC METABOLIC PANEL     FOOT EXAM  NO CHARGE [[id number]]     Hemoglobin A1c     Lipid panel reflex to direct LDL Non-fasting        Primary Care Provider Office Phone # Fax #    Matt Morrissey [phone]       [facility] ([facility]3] [address] [address]        Equal Access to Services     LAURENCE JACOBSON : Hadii aad ku hadasho Soomaali, waaxda luqadaha, qaybta kaalmada adeegyada, waxay marcia gama . So [facility] [phone].    ATENCIÓN: Si habla español, tiene a garvey disposición servicios gratuitos de asistencia lingüística. Llame al [phone].    We comply with applicable federal civil rights laws and [place] laws. We do not discriminate on the basis of race, color, national origin, age, disability, sex, sexual orientation, or gender identity.            Thank you!     Thank you for choosing [facility]  for your care. Our goal is always to provide you with excellent care. Hearing back from our patients is one way " we can continue to improve our services. Please take a few minutes to complete the written survey that you may receive in the mail after your visit with us. Thank you!             Your Updated Medication List - Protect others around you: Learn how to safely use, store and throw away your medicines at www.disposemymeds.org.          This list is accurate as of: 12/1/17  9:13 AM.  Always use your most recent med list.                   Brand Name Dispense Instructions for use Diagnosis    AMITRIPTYLINE HCL PO      Take 25 mg by mouth nightly as needed for sleep        aspirin 81 MG EC tablet     30 tablet    Take 1 tablet (81 mg) by mouth daily    Transient cerebral ischemia, unspecified type       ATORVASTATIN CALCIUM PO      Take 80 mg by mouth daily        blood glucose monitoring test strip    no brand specified    300 each    Use to test blood sugar three times daily or as directed.    Hypoglycemia       DULoxetine 30 MG EC capsule    CYMBALTA    90 capsule    Take 1 capsule (30 mg) by mouth daily    Polyneuropathy associated with underlying disease (H)       glipiZIDE 10 MG tablet    GLUCOTROL    180 tablet    Take 1 tablet (10 mg) by mouth 2 times daily (before meals)    Type 2 diabetes mellitus with diabetic peripheral angiopathy without gangrene, without long-term current use of insulin (H)       ipratropium 0.03 % spray    ATROVENT     Spray 2 sprays into both nostrils every 12 hours        lisinopril 2.5 MG tablet    PRINIVIL/Zestril    90 tablet    TAKE 1 TABLET(2.5 MG) BY MOUTH DAILY    Coronary artery disease involving native coronary artery of native heart without angina pectoris       METAMUCIL PO      Take 1 packet by mouth daily as needed        METFORMIN HCL PO      Take 1,000 mg by mouth 2 times daily (with meals)        metoprolol 25 MG 24 hr tablet    TOPROL-XL     Take 0.5 tablets (12.5 mg) by mouth daily    Cardiomyopathy, unspecified type (H)       omeprazole 40 MG capsule    priLOSEC    90  capsule    Take 1 capsule (40 mg) by mouth daily Take 30-60 minutes before a meal.    Other acute gastritis without hemorrhage       order for DME     1 Device    Equipment being ordered: [employer] Blood Glucose meter.    Type 2 diabetes mellitus without complication (H)       PLAVIX PO      Take 75 mg by mouth daily    Cough       tamsulosin 0.4 MG capsule    FLOMAX    90 capsule    Take 1 capsule (0.4 mg) by mouth daily    Benign non-nodular prostatic hyperplasia with lower urinary tract symptoms

## 2017-12-01 NOTE — PATIENT INSTRUCTIONS
Rather than sticking your finger in your ear when it itches    Use over the counter 1% hydrocortisone anti-itch cream     Place a small amount of 1% hydrocortisone cream on the tip of a Q-tip    Rub the cream gently on the OUTSIDE of your ear up to twice daily    DO NOT STICK THE Q-TIP IN YOUR EAR CANAL, THAT ONLY MAKES ITCHING AND OTHER MATTERS WORSE    The medicine will absorb into your ear and take the itchiness away

## 2017-12-01 NOTE — NURSING NOTE
"Chief Complaint   Patient presents with     RECHECK       Initial /79 (BP Location: Right arm, Patient Position: Chair, Cuff Size: Adult Regular)  Pulse 92  Temp 97.2  F (36.2  C) (Oral)  Ht 5' 5\" (1.651 m)  Wt 165 lb 6.4 oz (75 kg)  SpO2 95%  BMI 27.52 kg/m2 Estimated body mass index is 27.52 kg/(m^2) as calculated from the following:    Height as of this encounter: 5' 5\" (1.651 m).    Weight as of this encounter: 165 lb 6.4 oz (75 kg).  Medication Reconciliation: complete   Sandie Nieves MA  "

## 2017-12-01 NOTE — PROGRESS NOTES
SUBJECTIVE:   Dustin Pearce is a 89 year old male who presents to clinic today for the following health issues:      Diabetes Follow-up    Patient is checking blood sugars: once daily.  Results are as follows:       am -         Diabetic concerns: None     Symptoms of hypoglycemia (low blood sugar): none     Paresthesias (numbness or burning in feet) or sores: No     Date of last diabetic eye exam: 2016  Cardiomyopathy, unspecified type (H) Follow-up          Audiologist recommended wax removal from right ear, no symptoms  He feels well and is without complaints  Dizziness is improving  No foot symptoms since amputation  Holter did not show dangerous arrhythmia   No dyspnea, orthopnea, PND, edema    Problem list and histories reviewed & adjusted, as indicated.  Additional history: as documented    Patient Active Problem List   Diagnosis     Coronary artery disease involving native coronary artery of native heart without angina pectoris     Mild cognitive impairment     Hyperlipidemia LDL goal <70     Benign non-nodular prostatic hyperplasia with lower urinary tract symptoms     Gastroesophageal reflux disease without esophagitis     Cerebrovascular accident (H)     Carotid artery stenosis     Abdominal aortic aneurysm (H)     Lumbar back pain     Benign essential hypertension     TIA (transient ischemic attack)     Paroxysmal atrial fibrillation (H)     Ischemic cardiomyopathy     Aortic root dilatation (H)     Physical deconditioning     Diabetic ulcer of left foot associated with diabetes mellitus due to underlying condition (H)     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)     Anemia due to blood loss, acute     Diarrhea, unspecified type     Nausea     Acute pain of left shoulder     Balance problems     Generalized muscle weakness     PAD (peripheral artery disease) (H)     Aortic stenosis     RBBB with left anterior fascicular block     Past Surgical History:   Procedure Laterality Date     AMPUTATE  FOOT Left 6/4/2017    Procedure: AMPUTATE FOOT;  Sun Add#3: partial left foot amputation - Sagital Saw - Mini C-Arm;  Surgeon: Moe Kirk DPM;  Location:  OR     CHOLECYSTECTOMY       ESOPHAGOSCOPY, GASTROSCOPY, DUODENOSCOPY (EGD), COMBINED N/A 12/26/2016    Procedure: COMBINED ESOPHAGOSCOPY, GASTROSCOPY, DUODENOSCOPY (EGD);  Surgeon: Nasim Louis MD;  Location:  GI     GENITOURINARY SURGERY      KIDNEY STONE REMOVAL X 4     HC UGI ENDOSCOPY W EUS N/A 12/29/2016    Procedure: COMBINED ENDOSCOPIC ULTRASOUND, ESOPHAGOSCOPY, GASTROSCOPY, DUODENOSCOPY (EGD);  Surgeon: Nasim Louis MD;  Location:  GI     HERNIA REPAIR       INCISION AND DRAINAGE FOOT, COMBINED Left 6/6/2017    Procedure: COMBINED INCISION AND DRAINAGE FOOT;  REVISIONAL LEFT FOOT INCISION AND DRAINAGE WITH POSSIBLE FLAP CLOSURE , ANTIBIOTIC SOLUTION ;  Surgeon: Nabil Ann DPM;  Location:  OR     LASER HOLMIUM LITHOTRIPSY URETER(S), INSERT STENT, COMBINED  3/2/2012    Procedure:COMBINED CYSTOSCOPY, URETEROSCOPY, LASER HOLMIUM LITHOTRIPSY URETER(S), INSERT STENT; CYSTOSCOPY, RIGHT RETROGRADES, RIGHT URETEROSCOPY, HOLMIUM LASER, RIGHT STENT PLACEMENT; Surgeon:ANJELICA LEÓN; Location: OR     ROTATOR CUFF REPAIR RT/LT         Social History   Substance Use Topics     Smoking status: Former Smoker     Packs/day: 1.00     Years: 30.00     Types: Cigarettes     Quit date: 1/1/1999     Smokeless tobacco: Never Used     Alcohol use 4.2 oz/week     7 Standard drinks or equivalent per week      Comment: 1 drink per week     Family History   Problem Relation Age of Onset     Breast Cancer Mother      Heart Failure Father 81         Current Outpatient Prescriptions   Medication Sig Dispense Refill     lisinopril (PRINIVIL/ZESTRIL) 2.5 MG tablet TAKE 1 TABLET(2.5 MG) BY MOUTH DAILY 90 tablet 3     metoprolol (TOPROL-XL) 25 MG 24 hr tablet Take 0.5 tablets (12.5 mg) by mouth daily       glipiZIDE (GLUCOTROL) 10 MG  "tablet Take 1 tablet (10 mg) by mouth 2 times daily (before meals) 180 tablet 3     AMITRIPTYLINE HCL PO Take 25 mg by mouth nightly as needed for sleep       ipratropium (ATROVENT) 0.03 % spray Spray 2 sprays into both nostrils every 12 hours       DULoxetine (CYMBALTA) 30 MG EC capsule Take 1 capsule (30 mg) by mouth daily 90 capsule 3     omeprazole (PRILOSEC) 40 MG capsule Take 1 capsule (40 mg) by mouth daily Take 30-60 minutes before a meal. 90 capsule 3     tamsulosin (FLOMAX) 0.4 MG capsule Take 1 capsule (0.4 mg) by mouth daily 90 capsule 3     aspirin EC 81 MG EC tablet Take 1 tablet (81 mg) by mouth daily 30 tablet 0     METFORMIN HCL PO Take 1,000 mg by mouth 2 times daily (with meals)       ATORVASTATIN CALCIUM PO Take 80 mg by mouth daily       blood glucose monitoring (NO BRAND SPECIFIED) test strip Use to test blood sugar three times daily or as directed. 300 each 3     order for DME Equipment being ordered: True Matrix Blood Glucose meter. 1 Device 0     Psyllium (METAMUCIL PO) Take 1 packet by mouth daily as needed        Clopidogrel Bisulfate, [id number], (PLAVIX PO) Take 75 mg by mouth daily        Allergies   Allergen Reactions     Oxycodone Other (See Comments)     \"TERRIBLE SWEATING\"     Sulfa Drugs      Tetracycline          Reviewed and updated as needed this visit by clinical staff     Reviewed and updated as needed this visit by Provider         ROS:  Constitutional, HEENT, cardiovascular, pulmonary, gi and gu systems are negative, except as otherwise noted.      OBJECTIVE:   /70  Pulse 92  Temp 97.2  F (36.2  C) (Oral)  Ht 5' 5\" (1.651 m)  Wt 165 lb 6.4 oz (75 kg)  SpO2 95%  BMI 27.52 kg/m2  Body mass index is 27.52 kg/(m^2).  GENERAL: healthy, alert and no distress  HEENT: Irritated external canal on right with cerumenosis, cerumen removed, TM normal. Left TM and canal normal  MS: no gross musculoskeletal defects noted, no edema  NEURO: He walks with cane, symmetric " strength  PSYCH: mentation appears normal, affect normal/bright  Diabetic foot exam: normal DP and PT pulses, no trophic changes or ulcerative lesions and normal sensory exam; left 5th digit amputations, no ulcers     Diagnostic Test Results:  Labs pending     ASSESSMENT/PLAN:       1. 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 Impacted cerumen of right ear  Cerumen impaction removed from right ear  See patient instructions     3. Hyperlipidemia LDL goal <70  Recheck lipids, on statin therapy  - Lipid panel reflex to direct LDL Non-fasting    4. PAD (peripheral artery disease) (H)  On dual antiplatelet therapy  No claudication     5. Benign essential hypertension  Blood pressure well controlled on second check     6. Coronary artery disease involving native coronary artery of native heart without angina pectoris  No chest pain symptoms     7. Mild cognitive impairment  Stable; wife helps him with medications and cares       FUTURE APPOINTMENTS:       - 6 months or sooner as needed     Matt Morrissey MD  [facility]

## 2017-12-01 NOTE — LETTER
[facility]  [address] [facility]  [address]  [place]  [address]  Tel: [phone]    December 4, 2017    Dustin Pearce  [address] [facility] [address]        Dear Mr. Pearce,    The following letter pertains to your most recent diagnostic tests:    -Your micro albumin level is elevated. This means you have trace amounts of protein in the urine. It indicates that your kidneys are being affected by diabetes. Keeping blood pressure low is the best treatment in order to keep this stable. People with microalbuminuria should avoid anti-inflamatory agents such as motrin, alleve and ibuprofen as much as possible because excessive use of these medications can be harmful to the kidneys. Anybody that has microalbuminuria should be on lisinopril or losartan-as you already are-since these medications are protective for the kidney's in this situation.     -Your cholesterol panel looks healthy with the exception of mild triglycerides elevation. Reducing carbohydrates and fats in your diet, losing weight and consuming less alcohol can improve your triglyceride levels.     -Your potassium and calcium are high.  This could be from lab error.  We should recheck this lab tests as soon as is convenient for you.  You can schedule a lab appointment for that purpose.       -Your hemoglobin A1c which is a diabetes blood test that represents an average of you blood sugars over the last 3 months returned at 8.7.  This is above your goal of hemoglobin A1c less than 8.  I am not sure what happened here.  This is certainly a step in the wrong direction.      Bottom line:  Return to clinic to recheck your potassium and calcium level.      Your diabetes test got a lot worse.  What happened?  Based on these results, we need to discuss insulin therapy unless you can drill down and watch the starches and sugars in your diet to improve this.  I would like to give you 3 months to try and do so.  We should recheck your  hemoglobin A1c in 3 months.  If it remains high like it is now, then we need to get some insulin started.        Follow up:  Lab appointment as soon as possible for repeat potassium and calcium check.      Lab appointment in 3 months for follow up A1c test.      If you have any further questions or problems, please contact our office.      Sincerely,    Matt Morrissey MD/[facility]          Enclosure: Lab Results  Results for orders placed or performed in visit on 12/01/17   BASIC METABOLIC PANEL   Result Value Ref Range    Sodium 139 133 - 144 mmol/L    Potassium 5.9 (H) 3.4 - 5.3 mmol/L    Chloride 104 94 - 109 mmol/L    Carbon Dioxide 30 20 - 32 mmol/L    Anion Gap 5 3 - 14 mmol/L    Glucose 256 (H) 70 - 99 mg/dL    Urea Nitrogen 17 7 - 30 mg/dL    Creatinine 0.83 0.66 - 1.25 mg/dL    GFR Estimate 87 >60 mL/min/1.7m2    GFR Estimate If Black >90 >60 mL/min/1.7m2    Calcium 10.6 (H) 8.5 - 10.1 mg/dL   Albumin Random Urine Quantitative with Creat Ratio   Result Value Ref Range    Creatinine Urine 149 mg/dL    Albumin Urine mg/L 122 mg/L    Albumin Urine mg/g Cr 81.88 (H) 0 - 17 mg/g Cr   Hemoglobin A1c   Result Value Ref Range    Hemoglobin A1C 8.7 (H) 4.3 - 6.0 %   Lipid panel reflex to direct LDL Non-fasting   Result Value Ref Range    Cholesterol 142 <200 mg/dL    Triglycerides 186 (H) <150 mg/dL    HDL Cholesterol 66 >39 mg/dL    LDL Cholesterol Calculated 39 <100 mg/dL    Non HDL Cholesterol 76 <130 mg/dL

## 2017-12-02 NOTE — PROGRESS NOTES
The following letter pertains to your most recent diagnostic tests:    -Your micro albumin level is elevated. This means you have trace amounts of protein in the urine. It indicates that your kidneys are being affected by diabetes. Keeping blood pressure low is the best treatment in order to keep this stable. People with microalbuminuria should avoid anti-inflamatory agents such as motrin, alleve and ibuprofen as much as possible because excessive use of these medications can be harmful to the kidneys. Anybody that has microalbuminuria should be on lisinopril or losartan-as you already are-since these medications are protective for the kidney's in this situation.     -Your cholesterol panel looks healthy with the exception of mild triglycerides elevation. Reducing carbohydrates and fats in your diet, losing weight and consuming less alcohol can improve your triglyceride levels.     -Your potassium and calcium are high.  This could be from lab error.  We should recheck this lab tests as soon as is convenient for you.  You can schedule a lab appointment for that purpose.      -Your hemoglobin A1c which is a diabetes blood test that represents an average of you blood sugars over the last 3 months returned at 8.7.  This is above your goal of hemoglobin A1c less than 8.  I am not sure what happened here.  This is certainly a step in the wrong direction.      Bottom line:  Return to clinic to recheck your potassium and calcium level.      Your diabetes test got a lot worse.  What happened?  Based on these results, we need to discuss insulin therapy unless you can drill down and watch the starches and sugars in your diet to improve this.  I would like to give you 3 months to try and do so.  We should recheck your hemoglobin A1c in 3 months.  If it remains high like it is now, then we need to get some insulin started.        Follow up:  Lab appointment as soon as possible for repeat potassium and calcium check.      Lab  appointment in 3 months for follow up A1c test.        Sincerely,    Dr. Morrissey

## 2017-12-05 DIAGNOSIS — I25.10 CORONARY ARTERY DISEASE INVOLVING NATIVE CORONARY ARTERY OF NATIVE HEART WITHOUT ANGINA PECTORIS: ICD-10-CM

## 2017-12-06 RX ORDER — LISINOPRIL 2.5 MG/1
TABLET ORAL
Qty: 90 TABLET | Refills: 3 | Status: SHIPPED | OUTPATIENT
Start: 2017-12-06 | End: 2017-12-14

## 2017-12-07 ENCOUNTER — CARE COORDINATION (OUTPATIENT)
Dept: CARE COORDINATION | Facility: CLINIC | Age: 82
End: 2017-12-07

## 2017-12-07 NOTE — PROGRESS NOTES
Clinic Care Coordination Contact  Care Team Conversations    Follow up call to patient.   Informed patient he is due for lab only to check BMP (elevated potassium and calcium) per LOV note.   He requested writer call his friend Halina to schedule.   Contacted Halina, per patient's request, to set up lab only appt to check potassium and CBC.  LVM requesting return call to clinic to schedule.   Patient working towards controlling BG more tightly through dietary changes.  If A1C does not decrease, then PCP to initiate insulin.    Update: Patient scheduled for lab only 12/14/17  RN CCC to f/u in 2-4 wks.     Elvira Guerra, BSN, RN, PHN  Clinic Care Coordinator  [facility]  [phone]

## 2017-12-14 ENCOUNTER — OFFICE VISIT (OUTPATIENT)
Dept: CARDIOLOGY | Facility: CLINIC | Age: 82
End: 2017-12-14
Payer: MEDICARE

## 2017-12-14 VITALS
BODY MASS INDEX: 27.39 KG/M2 | DIASTOLIC BLOOD PRESSURE: 66 MMHG | WEIGHT: 164.4 LBS | SYSTOLIC BLOOD PRESSURE: 136 MMHG | HEART RATE: 65 BPM | HEIGHT: 65 IN

## 2017-12-14 DIAGNOSIS — I25.10 CORONARY ARTERY DISEASE INVOLVING NATIVE CORONARY ARTERY OF NATIVE HEART WITHOUT ANGINA PECTORIS: ICD-10-CM

## 2017-12-14 DIAGNOSIS — I25.5 ISCHEMIC CARDIOMYOPATHY: ICD-10-CM

## 2017-12-14 DIAGNOSIS — E11.42 DM TYPE 2 WITH DIABETIC PERIPHERAL NEUROPATHY (H): ICD-10-CM

## 2017-12-14 LAB — HBA1C MFR BLD: 8.9 % (ref 4.3–6)

## 2017-12-14 PROCEDURE — 83036 HEMOGLOBIN GLYCOSYLATED A1C: CPT | Performed by: INTERNAL MEDICINE

## 2017-12-14 PROCEDURE — 36415 COLL VENOUS BLD VENIPUNCTURE: CPT | Performed by: INTERNAL MEDICINE

## 2017-12-14 PROCEDURE — 99214 OFFICE O/P EST MOD 30 MIN: CPT | Performed by: INTERNAL MEDICINE

## 2017-12-14 NOTE — PROGRESS NOTES
HPI and Plan:   See dictation([id number])    Orders Placed This Encounter   Procedures     Follow-Up with Cardiologist       Orders Placed This Encounter   Medications     LISINOPRIL PO     Sig: Take 2.5 mg % by mouth daily     TRAMADOL HCL PO     Sig: Take 25 mg by mouth every 6 hours as needed for moderate to severe pain       Medications Discontinued During This Encounter   Medication Reason     lisinopril (PRINIVIL/ZESTRIL) 2.5 MG tablet Medication Reconciliation Clean Up     lisinopril (PRINIVIL/ZESTRIL) 2.5 MG tablet          Encounter Diagnoses   Name Primary?     Coronary artery disease involving native coronary artery of native heart without angina pectoris      Ischemic cardiomyopathy        CURRENT MEDICATIONS:  Current Outpatient Prescriptions   Medication Sig Dispense Refill     LISINOPRIL PO Take 2.5 mg % by mouth daily       TRAMADOL HCL PO Take 25 mg by mouth every 6 hours as needed for moderate to severe pain       metoprolol (TOPROL-XL) 25 MG 24 hr tablet Take 0.5 tablets (12.5 mg) by mouth daily       glipiZIDE (GLUCOTROL) 10 MG tablet Take 1 tablet (10 mg) by mouth 2 times daily (before meals) 180 tablet 3     AMITRIPTYLINE HCL PO Take 25 mg by mouth nightly as needed for sleep       ipratropium (ATROVENT) 0.03 % spray Spray 2 sprays into both nostrils every 12 hours       DULoxetine (CYMBALTA) 30 MG EC capsule Take 1 capsule (30 mg) by mouth daily 90 capsule 3     omeprazole (PRILOSEC) 40 MG capsule Take 1 capsule (40 mg) by mouth daily Take 30-60 minutes before a meal. 90 capsule 3     tamsulosin (FLOMAX) 0.4 MG capsule Take 1 capsule (0.4 mg) by mouth daily 90 capsule 3     aspirin EC 81 MG EC tablet Take 1 tablet (81 mg) by mouth daily 30 tablet 0     METFORMIN HCL PO Take 1,000 mg by mouth 2 times daily (with meals)       ATORVASTATIN CALCIUM PO Take 80 mg by mouth daily       Psyllium (METAMUCIL PO) Take 1 packet by mouth daily as needed        Clopidogrel Bisulfate, [id number], (PLAVIX PO)  "Take 75 mg by mouth daily        blood glucose monitoring (NO BRAND SPECIFIED) test strip Use to test blood sugar three times daily or as directed. 300 each 3     order for DME Equipment being ordered: True Matrix Blood Glucose meter. 1 Device 0       ALLERGIES     Allergies   Allergen Reactions     Oxycodone Other (See Comments)     \"TERRIBLE SWEATING\"     Sulfa Drugs      Tetracycline        PAST MEDICAL HISTORY:  Past Medical History:   Diagnosis Date     Aortic root dilatation (H)      Aortic stenosis      Benign essential hypertension 1/6/2017     Benign non-nodular prostatic hyperplasia with lower urinary tract symptoms 10/20/2016     CAD (coronary artery disease)     MI 1970     Cardiomyopathy (H)      Carotid artery stenosis 10/20/2016    chronically occluded left internal carotid artery     Carotid occlusion, left      Coronary artery disease involving native coronary artery of native heart without angina pectoris 10/20/2016     Diabetes mellitus (H)      DJD (degenerative joint disease)      Gastro-oesophageal reflux disease      Gastroesophageal reflux disease without esophagitis 10/20/2016     Heart attack      Hyperlipidemia      Hypertension      Mild cognitive impairment 10/20/2016     Nephrolithiasis     RECURRENT     Osteopenia      PAD (peripheral artery disease) (H)     peripheral angiogram 5/2017: The common iliac artery stenoses were stented and the superficial femoral artery stenoses were angioplastied     Paroxysmal atrial fibrillation (H)      PONV (postoperative nausea and vomiting)      RBBB with left anterior fascicular block      Unspecified cerebral artery occlusion with cerebral infarction        PAST SURGICAL HISTORY:  Past Surgical History:   Procedure Laterality Date     AMPUTATE FOOT Left 6/4/2017    Procedure: AMPUTATE FOOT;  Sun Add#3: partial left foot amputation - Sagital Saw - Mini C-Arm;  Surgeon: Moe Kirk DPM;  Location: [facility] OR     CHOLECYSTECTOMY       " ESOPHAGOSCOPY, GASTROSCOPY, DUODENOSCOPY (EGD), COMBINED N/A 12/26/2016    Procedure: COMBINED ESOPHAGOSCOPY, GASTROSCOPY, DUODENOSCOPY (EGD);  Surgeon: Nasim Louis MD;  Location:  GI     GENITOURINARY SURGERY      KIDNEY STONE REMOVAL X 4     HC UGI ENDOSCOPY W EUS N/A 12/29/2016    Procedure: COMBINED ENDOSCOPIC ULTRASOUND, ESOPHAGOSCOPY, GASTROSCOPY, DUODENOSCOPY (EGD);  Surgeon: Nasim Louis MD;  Location:  GI     HERNIA REPAIR       INCISION AND DRAINAGE FOOT, COMBINED Left 6/6/2017    Procedure: COMBINED INCISION AND DRAINAGE FOOT;  REVISIONAL LEFT FOOT INCISION AND DRAINAGE WITH POSSIBLE FLAP CLOSURE , ANTIBIOTIC SOLUTION ;  Surgeon: Nabil Ann DPM;  Location:  OR     LASER HOLMIUM LITHOTRIPSY URETER(S), INSERT STENT, COMBINED  3/2/2012    Procedure:COMBINED CYSTOSCOPY, URETEROSCOPY, LASER HOLMIUM LITHOTRIPSY URETER(S), INSERT STENT; CYSTOSCOPY, RIGHT RETROGRADES, RIGHT URETEROSCOPY, HOLMIUM LASER, RIGHT STENT PLACEMENT; Surgeon:ANJELICA LEÓN; Location: OR     ROTATOR CUFF REPAIR RT/LT         FAMILY HISTORY:  Family History   Problem Relation Age of Onset     Breast Cancer Mother      Heart Failure Father 81       SOCIAL HISTORY:  Social History     Social History     Marital status:      Spouse name: N/A     Number of children: N/A     Years of education: N/A     Social History Main Topics     Smoking status: Former Smoker     Packs/day: 1.00     Years: 30.00     Types: Cigarettes     Quit date: 1/1/1999     Smokeless tobacco: Never Used     Alcohol use 4.2 oz/week     7 Standard drinks or equivalent per week      Comment: 1 drink per week     Drug use: No     Sexual activity: Not Currently     Partners: Female     Other Topics Concern     None     Social History Narrative       Review of Systems:  Skin:  Positive for bruising pt reports easy bruisng.   Eyes:  Positive for glasses    ENT:  Positive for hearing loss    Respiratory:  Negative for      "  Cardiovascular:    dizziness;lightheadedness;Positive for pt reports pretty regular  Gastroenterology: Positive for heartburn;reflux on occ  Genitourinary:  Negative      Musculoskeletal:  Positive for arthritis;nocturnal cramping    Neurologic:  Positive for stroke pt reports minor stroke within previous 2 years; Pt reports major stroke in Jan 1999  Psychiatric:  Negative      Heme/Lymph/Imm:  Negative      Endocrine:  Positive for diabetes      Physical Exam:  Vitals: /66  Pulse 65  Ht 1.651 m (5' 5\")  Wt 74.6 kg (164 lb 6.4 oz)  BMI 27.36 kg/m2    Constitutional:           Skin:             Head:           Eyes:           Lymph:      ENT:           Neck:           Respiratory:            Cardiac:                                                           GI:           Extremities and Muscular Skeletal:                 Neurological:           Psych:             CC  Matt Morrissey MD  [facility]  [address] [facility] [address]  [place] [address]                  "

## 2017-12-14 NOTE — MR AVS SNAPSHOT
"              After Visit Summary   12/14/2017    Dustin Pearce    MRN: [id number]           Patient Information     Date Of Birth          1/31/1928        Visit Information        Provider Department      12/14/2017 9:45 AM Rafa Samuel MD [facility]        Today's Diagnoses     Coronary artery disease involving native coronary artery of native heart without angina pectoris        Ischemic cardiomyopathy           Follow-ups after your visit        Additional Services     Follow-Up with Cardiologist                 Future tests that were ordered for you today     Open Future Orders        Priority Expected Expires Ordered    Follow-Up with Cardiologist Routine 12/14/2018 12/15/2018 12/14/2017            Who to contact     If you have questions or need follow up information about today's clinic visit or your schedule please contact [facility] directly at [phone].  Normal or non-critical lab and imaging results will be communicated to you by MyChart, letter or phone within 4 business days after the clinic has received the results. If you do not hear from us within 7 days, please contact the clinic through MyChart or phone. If you have a critical or abnormal lab result, we will notify you by phone as soon as possible.  Submit refill requests through [employer] or call your pharmacy and they will forward the refill request to us. Please allow 3 business days for your refill to be completed.          Additional Information About Your Visit        Care EveryWhere ID     This is your Care EveryWhere ID. This could be used by other organizations to access your [facility] medical records  [id number]        Your Vitals Were     Pulse Height BMI (Body Mass Index)             65 1.651 m (5' 5\") 27.36 kg/m2          Blood Pressure from Last 3 Encounters:   12/14/17 136/66   12/01/17 110/70   11/03/17 90/60    Weight from Last 3 Encounters: "   12/14/17 74.6 kg (164 lb 6.4 oz)   12/01/17 75 kg (165 lb 6.4 oz)   11/03/17 76.7 kg (169 lb)              We Performed the Following     Follow-Up with Cardiologist          Today's Medication Changes          These changes are accurate as of: 12/14/17 10:28 AM.  If you have any questions, ask your nurse or doctor.               These medicines have changed or have updated prescriptions.        Dose/Directions    LISINOPRIL PO   This may have changed:  Another medication with the same name was removed. Continue taking this medication, and follow the directions you see here.   Changed by:  Rafa Samuel MD        Dose:  2.5 mg %   Take 2.5 mg % by mouth daily   Refills:  0                Primary Care Provider Office Phone # Fax #    Matt Morrissey [phone]       [facility] [address]  [facility] [address]        Equal Access to Services     LAURENCE JACOBSON AH: Hadii kinza ku hadasho Soomaali, waaxda luqadaha, qaybta kaalmada adeegyada, waxay jose alejandroin haytina gama . So [facility] [phone].    ATENCIÓN: Si habla español, tiene a garvey disposición servicios gratuitos de asistencia lingüística. Llame al [phone].    We comply with applicable federal civil rights laws and [place] laws. We do not discriminate on the basis of race, color, national origin, age, disability, sex, sexual orientation, or gender identity.            Thank you!     Thank you for choosing [facility]  for your care. Our goal is always to provide you with excellent care. Hearing back from our patients is one way we can continue to improve our services. Please take a few minutes to complete the written survey that you may receive in the mail after your visit with us. Thank you!             Your Updated Medication List - Protect others around you: Learn how to safely use, store and throw away your medicines at www.disposemymeds.org.          This list is  accurate as of: 12/14/17 10:28 AM.  Always use your most recent med list.                   Brand Name Dispense Instructions for use Diagnosis    AMITRIPTYLINE HCL PO      Take 25 mg by mouth nightly as needed for sleep        aspirin 81 MG EC tablet     30 tablet    Take 1 tablet (81 mg) by mouth daily    Transient cerebral ischemia, unspecified type       ATORVASTATIN CALCIUM PO      Take 80 mg by mouth daily        blood glucose monitoring test strip    no brand specified    300 each    Use to test blood sugar three times daily or as directed.    Hypoglycemia       DULoxetine 30 MG EC capsule    CYMBALTA    90 capsule    Take 1 capsule (30 mg) by mouth daily    Polyneuropathy associated with underlying disease (H)       glipiZIDE 10 MG tablet    GLUCOTROL    180 tablet    Take 1 tablet (10 mg) by mouth 2 times daily (before meals)    Type 2 diabetes mellitus with diabetic peripheral angiopathy without gangrene, without long-term current use of insulin (H)       ipratropium 0.03 % spray    ATROVENT     Spray 2 sprays into both nostrils every 12 hours        LISINOPRIL PO      Take 2.5 mg % by mouth daily        METAMUCIL PO      Take 1 packet by mouth daily as needed        METFORMIN HCL PO      Take 1,000 mg by mouth 2 times daily (with meals)        metoprolol 25 MG 24 hr tablet    TOPROL-XL     Take 0.5 tablets (12.5 mg) by mouth daily    Cardiomyopathy, unspecified type (H)       omeprazole 40 MG capsule    priLOSEC    90 capsule    Take 1 capsule (40 mg) by mouth daily Take 30-60 minutes before a meal.    Other acute gastritis without hemorrhage       order for DME     1 Device    Equipment being ordered: [employer] Blood Glucose meter.    Type 2 diabetes mellitus without complication (H)       PLAVIX PO      Take 75 mg by mouth daily    Cough       tamsulosin 0.4 MG capsule    FLOMAX    90 capsule    Take 1 capsule (0.4 mg) by mouth daily    Benign non-nodular prostatic hyperplasia with lower urinary tract  symptoms       TRAMADOL HCL PO      Take 25 mg by mouth every 6 hours as needed for moderate to severe pain

## 2017-12-14 NOTE — LETTER
12/14/2017      Matt Morrissey MD  [facility] [address]  [facility] [address]      RE: Dustin Poncearik       Dear Colleague,    I had the pleasure of seeing Dustin Pearce in the [facility].    REASON FOR CLINIC VISIT:  Followup for coronary artery disease, ischemic cardiomyopathy.      HISTORY OF PRESENT ILLNESS:  Mr. Pearce is a very pleasant 89, soon to be 89-year-old gentleman with history of CVA, history of CAD with possible MI in 1970s, hypertension, dyslipidemia, chronically occluded left carotid artery, diabetes, dyslipidemia, macular degeneration, BPH who is coming for routine followup regarding CAD and ischemic cardiomyopathy.        To be noted echocardiogram showed moderately reduced LV systolic function with LVEF of 35%-40% with severe inferolateral wall hypokinesia.  There is mild aortic stenosis.  He underwent Lexiscan stress test earlier this year that showed medium sized perfusion defect of severe intensity involving the mid to basal inferior and inferoseptal wall as well as basal inferolateral wall, which was mildly reversible consistent with previous myocardial infarction with mild ezekiel-infarct ischemia.  LVEF was 37% on stress test.        We had long discussions last time in terms of further management of CAD, ischemic cardiomyopathy and we have discussed the option of medical management versus medical management plus coronary angiogram.  Given his asymptomatic status, no anginal symptoms, not in congestive heart failure, his age and other comorbidities, the patient has opted for medical management.        Recently the patient was seen by Sruthi Padgett because of some concerns about dizziness which was postural in nature and he was diagnosed with some orthostatic hypotension and beta blocker dose was decreased.  The patient tells me that this has significantly improved his symptoms.  He has no chest discomfort either at rest or with physical  activity.  No shortness of breath at rest or with physical activity.  He is on aspirin.  To be noted, he is also on Plavix, which was added after his stroke by Neurology.  He is additionally on Lipitor and low dose lisinopril and beta blocker.  LDL is quite well controlled at 39.  To be noted, he recently had a BMP done through Dr. Morrissey's office that showed some mild hyperkalemia at 5.9 and it looks like Dr. Morrissey is planning on followup of that.      PHYSICAL EXAMINATION:   VITAL SIGNS:  Blood pressure 136/66, heart rate 65, regular, weight 164 pounds, BMI 27.41.   GENERAL:  The patient appears pleasant, comfortable.   NECK:  Normal JVP, no bruit.   CARDIOVASCULAR SYSTEM:  S1, S2 normal, no murmur, rub or gallop.   RESPIRATORY SYSTEM:  Clear to auscultation bilaterally.     ABDOMEN:  Soft, nontender.   EXTREMITIES:  No pitting pedal edema.   NEUROLOGICAL:  Alert, oriented.   PSYCH:  Normal affect.   SKIN:  No obvious rash.   HEENT:  No pallor or icterus.      To be noted, he also had a Holter monitor done recently that showed underlying rhythm to be sinus.  There were 2490 ventricular ectopics.  There was one 4-beat run at 109 beats per minute.  No events were reported during this monitoring.      IMPRESSION AND PLAN:  A very pleasant 89-year-old gentleman with history of CAD, ischemic cardiomyopathy, clinically, no anginal symptoms or congestive heart failure, dyslipidemia, history of stroke, hypertension, diabetes who recently had some dizziness and found to have orthostatic hypotension and beta blocker dose was decreased.  His symptoms have almost resolved since then.  Clinically, he appears stable cardiac status-wise without any symptoms of angina or congestive heart failure.  At this time, I recommend continuing low-dose beta blocker.  If he continues to remain stable cardiac status-wise, we can see him back in 1 year, sooner if any change in clinical status, especially if any exertional symptoms.  As noted  above, due to lack of anginal symptoms or congestive heart failure and patient's preference and his comorbidities, we will continue medical management for CAD and ischemic cardiomyopathy.       Outpatient Encounter Prescriptions as of 12/14/2017   Medication Sig Dispense Refill     LISINOPRIL PO Take 2.5 mg by mouth daily        [DISCONTINUED] TRAMADOL HCL PO Take 25 mg by mouth every 6 hours as needed for moderate to severe pain       metoprolol (TOPROL-XL) 25 MG 24 hr tablet Take 0.5 tablets (12.5 mg) by mouth daily       glipiZIDE (GLUCOTROL) 10 MG tablet Take 1 tablet (10 mg) by mouth 2 times daily (before meals) 180 tablet 3     AMITRIPTYLINE HCL PO Take 25 mg by mouth nightly as needed for sleep       ipratropium (ATROVENT) 0.03 % spray Spray 2 sprays into both nostrils every 12 hours       DULoxetine (CYMBALTA) 30 MG EC capsule Take 1 capsule (30 mg) by mouth daily 90 capsule 3     omeprazole (PRILOSEC) 40 MG capsule Take 1 capsule (40 mg) by mouth daily Take 30-60 minutes before a meal. 90 capsule 3     tamsulosin (FLOMAX) 0.4 MG capsule Take 1 capsule (0.4 mg) by mouth daily 90 capsule 3     aspirin EC 81 MG EC tablet Take 1 tablet (81 mg) by mouth daily 30 tablet 0     METFORMIN HCL PO Take 1,000 mg by mouth 2 times daily (with meals)       ATORVASTATIN CALCIUM PO Take 80 mg by mouth daily       [DISCONTINUED] Psyllium (METAMUCIL PO) Take 1 packet by mouth daily as needed        Clopidogrel Bisulfate, [id number], (PLAVIX PO) Take 75 mg by mouth daily        [DISCONTINUED] lisinopril (PRINIVIL/ZESTRIL) 2.5 MG tablet TAKE 1 TABLET(2.5 MG) BY MOUTH DAILY 90 tablet 3     [DISCONTINUED] lisinopril (PRINIVIL/ZESTRIL) 2.5 MG tablet TAKE 1 TABLET(2.5 MG) BY MOUTH DAILY (Patient taking differently: TAKE 1 TABLET(12.5 MG) BY MOUTH DAILY) 90 tablet 3     blood glucose monitoring (NO BRAND SPECIFIED) test strip Use to test blood sugar three times daily or as directed. 300 each 3     order for DME Equipment being  ordered: [employer] Blood Glucose meter. 1 Device 0     No facility-administered encounter medications on file as of 12/14/2017.        Again, thank you for allowing me to participate in the care of your patient.      Sincerely,    Rafa Samuel MD     [facility]

## 2017-12-14 NOTE — PROGRESS NOTES
REASON FOR CLINIC VISIT:  Followup for coronary artery disease, ischemic cardiomyopathy.      HISTORY OF PRESENT ILLNESS:  Mr. Pearce is a very pleasant 89, soon to be 89-year-old gentleman with history of CVA, history of CAD with possible MI in 1970s, hypertension, dyslipidemia, chronically occluded left carotid artery, diabetes, dyslipidemia, macular degeneration, BPH who is coming for routine followup regarding CAD and ischemic cardiomyopathy.        To be noted echocardiogram showed moderately reduced LV systolic function with LVEF of 35%-40% with severe inferolateral wall hypokinesia.  There is mild aortic stenosis.  He underwent Lexiscan stress test earlier this year that showed medium sized perfusion defect of severe intensity involving the mid to basal inferior and inferoseptal wall as well as basal inferolateral wall, which was mildly reversible consistent with previous myocardial infarction with mild ezekiel-infarct ischemia.  LVEF was 37% on stress test.        We had long discussions last time in terms of further management of CAD, ischemic cardiomyopathy and we have discussed the option of medical management versus medical management plus coronary angiogram.  Given his asymptomatic status, no anginal symptoms, not in congestive heart failure, his age and other comorbidities, the patient has opted for medical management.        Recently the patient was seen by Sruthi Padgett because of some concerns about dizziness which was postural in nature and he was diagnosed with some orthostatic hypotension and beta blocker dose was decreased.  The patient tells me that this has significantly improved his symptoms.  He has no chest discomfort either at rest or with physical activity.  No shortness of breath at rest or with physical activity.  He is on aspirin.  To be noted, he is also on Plavix, which was added after his stroke by Neurology.  He is additionally on Lipitor and low dose lisinopril and beta blocker.  LDL is  quite well controlled at 39.  To be noted, he recently had a BMP done through Dr. Morrissey's office that showed some mild hyperkalemia at 5.9 and it looks like Dr. Morrissey is planning on followup of that.      PHYSICAL EXAMINATION:   VITAL SIGNS:  Blood pressure 136/66, heart rate 65, regular, weight 164 pounds, BMI 27.41.   GENERAL:  The patient appears pleasant, comfortable.   NECK:  Normal JVP, no bruit.   CARDIOVASCULAR SYSTEM:  S1, S2 normal, no murmur, rub or gallop.   RESPIRATORY SYSTEM:  Clear to auscultation bilaterally.     ABDOMEN:  Soft, nontender.   EXTREMITIES:  No pitting pedal edema.   NEUROLOGICAL:  Alert, oriented.   PSYCH:  Normal affect.   SKIN:  No obvious rash.   HEENT:  No pallor or icterus.      To be noted, he also had a Holter monitor done recently that showed underlying rhythm to be sinus.  There were 2490 ventricular ectopics.  There was one 4-beat run at 109 beats per minute.  No events were reported during this monitoring.      IMPRESSION AND PLAN:  A very pleasant 89-year-old gentleman with history of CAD, ischemic cardiomyopathy, clinically, no anginal symptoms or congestive heart failure, dyslipidemia, history of stroke, hypertension, diabetes who recently had some dizziness and found to have orthostatic hypotension and beta blocker dose was decreased.  His symptoms have almost resolved since then.  Clinically, he appears stable cardiac status-wise without any symptoms of angina or congestive heart failure.  At this time, I recommend continuing low-dose beta blocker.  If he continues to remain stable cardiac status-wise, we can see him back in 1 year, sooner if any change in clinical status, especially if any exertional symptoms.  As noted above, due to lack of anginal symptoms or congestive heart failure and patient's preference and his comorbidities, we will continue medical management for CAD and ischemic cardiomyopathy.         JUANITA LOPEZ MD             D: 12/14/2017 10:39   T:  2017 11:15   MT: ARINA      Name:     SID AGUIAR   MRN:      [id number]        Account:      [id number]   :      1928           Service Date: 2017      Document: [id number]

## 2017-12-31 ENCOUNTER — HOSPITAL ENCOUNTER (INPATIENT)
Facility: CLINIC | Age: 82
LOS: 4 days | Discharge: NURSING, REHAB OR LONG-TERM CARE | DRG: 038 | End: 2018-01-04
Attending: EMERGENCY MEDICINE | Admitting: INTERNAL MEDICINE
Payer: MEDICARE

## 2017-12-31 ENCOUNTER — APPOINTMENT (OUTPATIENT)
Dept: CT IMAGING | Facility: CLINIC | Age: 82
DRG: 038 | End: 2017-12-31
Attending: EMERGENCY MEDICINE
Payer: MEDICARE

## 2017-12-31 ENCOUNTER — APPOINTMENT (OUTPATIENT)
Dept: SPEECH THERAPY | Facility: CLINIC | Age: 82
DRG: 038 | End: 2017-12-31
Attending: INTERNAL MEDICINE
Payer: MEDICARE

## 2017-12-31 DIAGNOSIS — I65.21 CAROTID STENOSIS, SYMPTOMATIC W/O INFARCT, RIGHT: ICD-10-CM

## 2017-12-31 DIAGNOSIS — E11.69 TYPE 2 DIABETES MELLITUS WITH OTHER SPECIFIED COMPLICATION, WITHOUT LONG-TERM CURRENT USE OF INSULIN (H): ICD-10-CM

## 2017-12-31 DIAGNOSIS — G45.1 TIA INVOLVING RIGHT INTERNAL CAROTID ARTERY: Primary | ICD-10-CM

## 2017-12-31 DIAGNOSIS — I10 ESSENTIAL HYPERTENSION: ICD-10-CM

## 2017-12-31 DIAGNOSIS — E55.9 VITAMIN D DEFICIENCY: ICD-10-CM

## 2017-12-31 DIAGNOSIS — M54.2 NECK PAIN: ICD-10-CM

## 2017-12-31 PROBLEM — I63.9 STROKE OF UNKNOWN ETIOLOGY (H): Status: ACTIVE | Noted: 2017-12-31

## 2017-12-31 LAB
ANION GAP SERPL CALCULATED.3IONS-SCNC: 7 MMOL/L (ref 3–14)
APTT PPP: 32 SEC (ref 22–37)
BASOPHILS # BLD AUTO: 0 10E9/L (ref 0–0.2)
BASOPHILS NFR BLD AUTO: 0.2 %
BUN SERPL-MCNC: 19 MG/DL (ref 7–30)
CALCIUM SERPL-MCNC: 9.5 MG/DL (ref 8.5–10.1)
CHLORIDE SERPL-SCNC: 102 MMOL/L (ref 94–109)
CHOLEST SERPL-MCNC: 133 MG/DL
CO2 SERPL-SCNC: 28 MMOL/L (ref 20–32)
CREAT SERPL-MCNC: 0.73 MG/DL (ref 0.66–1.25)
CRP SERPL-MCNC: <2.9 MG/L (ref 0–8)
DIFFERENTIAL METHOD BLD: NORMAL
EOSINOPHIL # BLD AUTO: 0.1 10E9/L (ref 0–0.7)
EOSINOPHIL NFR BLD AUTO: 0.5 %
ERYTHROCYTE [DISTWIDTH] IN BLOOD BY AUTOMATED COUNT: 13 % (ref 10–15)
GFR SERPL CREATININE-BSD FRML MDRD: >90 ML/MIN/1.7M2
GLUCOSE BLDC GLUCOMTR-MCNC: 149 MG/DL (ref 70–99)
GLUCOSE BLDC GLUCOMTR-MCNC: 170 MG/DL (ref 70–99)
GLUCOSE SERPL-MCNC: 193 MG/DL (ref 70–99)
GLUCOSE SERPL-MCNC: 222 MG/DL (ref 70–99)
HBA1C MFR BLD: 8.6 % (ref 4.3–6)
HCT VFR BLD AUTO: 41.8 % (ref 40–53)
HDLC SERPL-MCNC: 43 MG/DL
HGB BLD-MCNC: 13.7 G/DL (ref 13.3–17.7)
IMM GRANULOCYTES # BLD: 0 10E9/L (ref 0–0.4)
IMM GRANULOCYTES NFR BLD: 0.3 %
INR PPP: 0.99 (ref 0.86–1.14)
LDLC SERPL CALC-MCNC: 61 MG/DL
LYMPHOCYTES # BLD AUTO: 2.4 10E9/L (ref 0.8–5.3)
LYMPHOCYTES NFR BLD AUTO: 25.5 %
MCH RBC QN AUTO: 30.9 PG (ref 26.5–33)
MCHC RBC AUTO-ENTMCNC: 32.8 G/DL (ref 31.5–36.5)
MCV RBC AUTO: 94 FL (ref 78–100)
MONOCYTES # BLD AUTO: 0.9 10E9/L (ref 0–1.3)
MONOCYTES NFR BLD AUTO: 9.2 %
NEUTROPHILS # BLD AUTO: 6.1 10E9/L (ref 1.6–8.3)
NEUTROPHILS NFR BLD AUTO: 64.3 %
NONHDLC SERPL-MCNC: 90 MG/DL
NRBC # BLD AUTO: 0 10*3/UL
NRBC BLD AUTO-RTO: 0 /100
PLATELET # BLD AUTO: 160 10E9/L (ref 150–450)
POTASSIUM SERPL-SCNC: 4.4 MMOL/L (ref 3.4–5.3)
POTASSIUM SERPL-SCNC: 5.4 MMOL/L (ref 3.4–5.3)
RBC # BLD AUTO: 4.43 10E12/L (ref 4.4–5.9)
SODIUM SERPL-SCNC: 137 MMOL/L (ref 133–144)
TRIGL SERPL-MCNC: 147 MG/DL
TROPONIN I SERPL-MCNC: 0.02 UG/L (ref 0–0.04)
TROPONIN I SERPL-MCNC: 0.03 UG/L (ref 0–0.04)
TSH SERPL DL<=0.005 MIU/L-ACNC: 2.16 MU/L (ref 0.4–4)
WBC # BLD AUTO: 9.6 10E9/L (ref 4–11)

## 2017-12-31 PROCEDURE — 84132 ASSAY OF SERUM POTASSIUM: CPT | Performed by: INTERNAL MEDICINE

## 2017-12-31 PROCEDURE — 82306 VITAMIN D 25 HYDROXY: CPT | Performed by: INTERNAL MEDICINE

## 2017-12-31 PROCEDURE — 25000128 H RX IP 250 OP 636: Performed by: EMERGENCY MEDICINE

## 2017-12-31 PROCEDURE — 85025 COMPLETE CBC W/AUTO DIFF WBC: CPT | Performed by: EMERGENCY MEDICINE

## 2017-12-31 PROCEDURE — 00000146 ZZHCL STATISTIC GLUCOSE BY METER IP

## 2017-12-31 PROCEDURE — 70498 CT ANGIOGRAPHY NECK: CPT

## 2017-12-31 PROCEDURE — 25000125 ZZHC RX 250: Performed by: EMERGENCY MEDICINE

## 2017-12-31 PROCEDURE — 25000132 ZZH RX MED GY IP 250 OP 250 PS 637: Mod: GY | Performed by: EMERGENCY MEDICINE

## 2017-12-31 PROCEDURE — 84484 ASSAY OF TROPONIN QUANT: CPT | Performed by: INTERNAL MEDICINE

## 2017-12-31 PROCEDURE — 12000000 ZZH R&B MED SURG/OB

## 2017-12-31 PROCEDURE — 80061 LIPID PANEL: CPT | Performed by: INTERNAL MEDICINE

## 2017-12-31 PROCEDURE — 84443 ASSAY THYROID STIM HORMONE: CPT | Performed by: INTERNAL MEDICINE

## 2017-12-31 PROCEDURE — A9270 NON-COVERED ITEM OR SERVICE: HCPCS | Mod: GY | Performed by: EMERGENCY MEDICINE

## 2017-12-31 PROCEDURE — 70460 CT HEAD/BRAIN W/DYE: CPT

## 2017-12-31 PROCEDURE — 92610 EVALUATE SWALLOWING FUNCTION: CPT | Mod: GN | Performed by: SPEECH-LANGUAGE PATHOLOGIST

## 2017-12-31 PROCEDURE — 40000225 ZZH STATISTIC SLP WARD VISIT: Performed by: SPEECH-LANGUAGE PATHOLOGIST

## 2017-12-31 PROCEDURE — 25000132 ZZH RX MED GY IP 250 OP 250 PS 637: Mod: GY | Performed by: INTERNAL MEDICINE

## 2017-12-31 PROCEDURE — 93005 ELECTROCARDIOGRAM TRACING: CPT

## 2017-12-31 PROCEDURE — 99285 EMERGENCY DEPT VISIT HI MDM: CPT | Mod: 25

## 2017-12-31 PROCEDURE — 85730 THROMBOPLASTIN TIME PARTIAL: CPT | Performed by: EMERGENCY MEDICINE

## 2017-12-31 PROCEDURE — 99223 1ST HOSP IP/OBS HIGH 75: CPT | Mod: AI | Performed by: INTERNAL MEDICINE

## 2017-12-31 PROCEDURE — 36415 COLL VENOUS BLD VENIPUNCTURE: CPT | Performed by: INTERNAL MEDICINE

## 2017-12-31 PROCEDURE — 70450 CT HEAD/BRAIN W/O DYE: CPT

## 2017-12-31 PROCEDURE — 86140 C-REACTIVE PROTEIN: CPT | Performed by: INTERNAL MEDICINE

## 2017-12-31 PROCEDURE — 25000128 H RX IP 250 OP 636: Performed by: INTERNAL MEDICINE

## 2017-12-31 PROCEDURE — 82947 ASSAY GLUCOSE BLOOD QUANT: CPT | Performed by: INTERNAL MEDICINE

## 2017-12-31 PROCEDURE — 83036 HEMOGLOBIN GLYCOSYLATED A1C: CPT | Performed by: INTERNAL MEDICINE

## 2017-12-31 PROCEDURE — A9270 NON-COVERED ITEM OR SERVICE: HCPCS | Mod: GY | Performed by: INTERNAL MEDICINE

## 2017-12-31 PROCEDURE — 85610 PROTHROMBIN TIME: CPT | Performed by: EMERGENCY MEDICINE

## 2017-12-31 PROCEDURE — 80048 BASIC METABOLIC PNL TOTAL CA: CPT | Performed by: EMERGENCY MEDICINE

## 2017-12-31 RX ORDER — ASPIRIN 300 MG/1
300 SUPPOSITORY RECTAL DAILY
Status: DISCONTINUED | OUTPATIENT
Start: 2017-12-31 | End: 2018-01-04 | Stop reason: HOSPADM

## 2017-12-31 RX ORDER — PROCHLORPERAZINE 25 MG
12.5 SUPPOSITORY, RECTAL RECTAL EVERY 12 HOURS PRN
Status: DISCONTINUED | OUTPATIENT
Start: 2017-12-31 | End: 2018-01-04

## 2017-12-31 RX ORDER — CLOPIDOGREL BISULFATE 75 MG/1
75 TABLET ORAL DAILY
Status: DISCONTINUED | OUTPATIENT
Start: 2017-12-31 | End: 2018-01-02

## 2017-12-31 RX ORDER — NICOTINE POLACRILEX 4 MG
15-30 LOZENGE BUCCAL
Status: DISCONTINUED | OUTPATIENT
Start: 2017-12-31 | End: 2017-12-31

## 2017-12-31 RX ORDER — HYDROCODONE BITARTRATE AND ACETAMINOPHEN 5; 325 MG/1; MG/1
1 TABLET ORAL EVERY 4 HOURS PRN
Status: ON HOLD | COMMUNITY
End: 2018-01-04

## 2017-12-31 RX ORDER — PROCHLORPERAZINE MALEATE 5 MG
5 TABLET ORAL EVERY 6 HOURS PRN
Status: DISCONTINUED | OUTPATIENT
Start: 2017-12-31 | End: 2018-01-04

## 2017-12-31 RX ORDER — METOCLOPRAMIDE 5 MG/1
5 TABLET ORAL EVERY 6 HOURS PRN
Status: DISCONTINUED | OUTPATIENT
Start: 2017-12-31 | End: 2018-01-04

## 2017-12-31 RX ORDER — NALOXONE HYDROCHLORIDE 0.4 MG/ML
.1-.4 INJECTION, SOLUTION INTRAMUSCULAR; INTRAVENOUS; SUBCUTANEOUS
Status: DISCONTINUED | OUTPATIENT
Start: 2017-12-31 | End: 2018-01-04 | Stop reason: HOSPADM

## 2017-12-31 RX ORDER — LIDOCAINE 40 MG/G
CREAM TOPICAL
Status: DISCONTINUED | OUTPATIENT
Start: 2017-12-31 | End: 2018-01-04 | Stop reason: HOSPADM

## 2017-12-31 RX ORDER — NICOTINE POLACRILEX 4 MG
15-30 LOZENGE BUCCAL
Status: DISCONTINUED | OUTPATIENT
Start: 2017-12-31 | End: 2018-01-04 | Stop reason: HOSPADM

## 2017-12-31 RX ORDER — SODIUM CHLORIDE 9 MG/ML
INJECTION, SOLUTION INTRAVENOUS CONTINUOUS
Status: DISCONTINUED | OUTPATIENT
Start: 2017-12-31 | End: 2018-01-01

## 2017-12-31 RX ORDER — DEXTROSE MONOHYDRATE 25 G/50ML
25-50 INJECTION, SOLUTION INTRAVENOUS
Status: DISCONTINUED | OUTPATIENT
Start: 2017-12-31 | End: 2017-12-31

## 2017-12-31 RX ORDER — ONDANSETRON 2 MG/ML
4 INJECTION INTRAMUSCULAR; INTRAVENOUS EVERY 6 HOURS PRN
Status: DISCONTINUED | OUTPATIENT
Start: 2017-12-31 | End: 2018-01-04 | Stop reason: HOSPADM

## 2017-12-31 RX ORDER — LABETALOL HYDROCHLORIDE 5 MG/ML
10-40 INJECTION, SOLUTION INTRAVENOUS EVERY 10 MIN PRN
Status: DISCONTINUED | OUTPATIENT
Start: 2017-12-31 | End: 2018-01-04

## 2017-12-31 RX ORDER — ASPIRIN 300 MG/1
300 SUPPOSITORY RECTAL ONCE
Status: COMPLETED | OUTPATIENT
Start: 2017-12-31 | End: 2017-12-31

## 2017-12-31 RX ORDER — IPRATROPIUM BROMIDE 21 UG/1
2 SPRAY, METERED NASAL EVERY 12 HOURS
Status: DISCONTINUED | OUTPATIENT
Start: 2017-12-31 | End: 2018-01-04 | Stop reason: HOSPADM

## 2017-12-31 RX ORDER — METOCLOPRAMIDE HYDROCHLORIDE 5 MG/ML
5 INJECTION INTRAMUSCULAR; INTRAVENOUS EVERY 6 HOURS PRN
Status: DISCONTINUED | OUTPATIENT
Start: 2017-12-31 | End: 2018-01-04

## 2017-12-31 RX ORDER — ENALAPRILAT 1.25 MG/ML
0.62 INJECTION INTRAVENOUS EVERY 6 HOURS PRN
Status: DISCONTINUED | OUTPATIENT
Start: 2017-12-31 | End: 2017-12-31

## 2017-12-31 RX ORDER — TAMSULOSIN HYDROCHLORIDE 0.4 MG/1
0.4 CAPSULE ORAL DAILY
Status: DISCONTINUED | OUTPATIENT
Start: 2017-12-31 | End: 2018-01-04 | Stop reason: HOSPADM

## 2017-12-31 RX ORDER — DEXTROSE MONOHYDRATE 25 G/50ML
25-50 INJECTION, SOLUTION INTRAVENOUS
Status: DISCONTINUED | OUTPATIENT
Start: 2017-12-31 | End: 2018-01-04 | Stop reason: HOSPADM

## 2017-12-31 RX ORDER — ATORVASTATIN CALCIUM 40 MG/1
80 TABLET, FILM COATED ORAL DAILY
Status: DISCONTINUED | OUTPATIENT
Start: 2017-12-31 | End: 2018-01-04 | Stop reason: HOSPADM

## 2017-12-31 RX ORDER — DULOXETIN HYDROCHLORIDE 30 MG/1
30 CAPSULE, DELAYED RELEASE ORAL DAILY
Status: DISCONTINUED | OUTPATIENT
Start: 2017-12-31 | End: 2018-01-04 | Stop reason: HOSPADM

## 2017-12-31 RX ORDER — ONDANSETRON 4 MG/1
4 TABLET, ORALLY DISINTEGRATING ORAL EVERY 6 HOURS PRN
Status: DISCONTINUED | OUTPATIENT
Start: 2017-12-31 | End: 2018-01-04 | Stop reason: HOSPADM

## 2017-12-31 RX ORDER — IOPAMIDOL 755 MG/ML
120 INJECTION, SOLUTION INTRAVASCULAR ONCE
Status: COMPLETED | OUTPATIENT
Start: 2017-12-31 | End: 2017-12-31

## 2017-12-31 RX ADMIN — IOPAMIDOL 120 ML: 755 INJECTION, SOLUTION INTRAVENOUS at 11:42

## 2017-12-31 RX ADMIN — SODIUM CHLORIDE: 9 INJECTION, SOLUTION INTRAVENOUS at 20:14

## 2017-12-31 RX ADMIN — CLOPIDOGREL 75 MG: 75 TABLET, FILM COATED ORAL at 17:54

## 2017-12-31 RX ADMIN — SODIUM CHLORIDE 100 ML: 9 INJECTION, SOLUTION INTRAVENOUS at 11:43

## 2017-12-31 RX ADMIN — IPRATROPIUM BROMIDE 2 SPRAY: 21 SPRAY NASAL at 17:22

## 2017-12-31 RX ADMIN — ATORVASTATIN CALCIUM 80 MG: 80 TABLET, FILM COATED ORAL at 17:50

## 2017-12-31 RX ADMIN — INSULIN ASPART 1 UNITS: 100 INJECTION, SOLUTION INTRAVENOUS; SUBCUTANEOUS at 17:54

## 2017-12-31 RX ADMIN — TAMSULOSIN HYDROCHLORIDE 0.4 MG: 0.4 CAPSULE ORAL at 17:53

## 2017-12-31 RX ADMIN — ASPIRIN 300 MG: 300 SUPPOSITORY RECTAL at 12:19

## 2017-12-31 RX ADMIN — Medication 12.5 MG: at 17:53

## 2017-12-31 RX ADMIN — DULOXETINE 30 MG: 30 CAPSULE, DELAYED RELEASE ORAL at 17:50

## 2017-12-31 ASSESSMENT — ACTIVITIES OF DAILY LIVING (ADL)
AMBULATION: 1-->ASSISTIVE EQUIPMENT
TRANSFERRING: 1-->ASSISTIVE EQUIPMENT
FALL_HISTORY_WITHIN_LAST_SIX_MONTHS: YES
RETIRED_EATING: 0-->INDEPENDENT
SWALLOWING: 0-->SWALLOWS FOODS/LIQUIDS WITHOUT DIFFICULTY
BATHING: 0-->INDEPENDENT
COGNITION: 1 - ATTENTION OR MEMORY DEFICITS
NUMBER_OF_TIMES_PATIENT_HAS_FALLEN_WITHIN_LAST_SIX_MONTHS: 4
DRESS: 0-->INDEPENDENT
ADLS_ACUITY_SCORE: 15
TOILETING: 1-->ASSISTIVE EQUIPMENT
RETIRED_COMMUNICATION: 0-->UNDERSTANDS/COMMUNICATES WITHOUT DIFFICULTY
WHICH_OF_THE_ABOVE_FUNCTIONAL_RISKS_HAD_A_RECENT_ONSET_OR_CHANGE?: FALL HISTORY
ADLS_ACUITY_SCORE: 15

## 2017-12-31 ASSESSMENT — ENCOUNTER SYMPTOMS
CONFUSION: 1
NECK PAIN: 0
HEADACHES: 0
WEAKNESS: 0

## 2017-12-31 ASSESSMENT — VISUAL ACUITY
OU: NORMAL ACUITY
OU: OTHER (SEE COMMENT)

## 2017-12-31 NOTE — ED PROVIDER NOTES
"  History     Chief Complaint:  Cerebrovascular Accident    HPI   Dustin Pearce is a 89 year old male who presents with confusion. The patient arrives via EMS, who provide the history for this visit. EMS reports that the patient and his wife were getting ready to go visit a family member when his wife noticed a change in the patient's mentation, specifically that he was unable to complete very routine tasks or follow her directions. The wife then took the patient to their bedroom to lie down and was in bed for about an hour before falling out. The wife reported that the patient did not strike his head during this fall, but she became very concerned and called EMS for transport to the ED. En route, EMS reports that the patient's blood sugar was 225, and his blood pressure was 130s/70s. They state that while they were unable to do a thorough neurologic exam due to the patient being uncooperative, they noticed some possible left-sided weakness. Here, the patient denies any pain currently and specifically denies headache, neck pain, or chest pain. He denies any numbness, weakness, or tingling.     Additional information was obtained upon arrival of his wife. She reports that the patient was last at his baseline last night when he went to bed. She states that the patient awoke around 5:00 AM feeling confused and \"not quite right.\" She continues that he then awoke at 8:30 AM with slurred speech and persistent confusion.       Allergies:  Oxycodone  Sulfa  Tetracycline    Medications:    Lisinopril  Tramadol  Metoprolol  Glipizide  Amitriptyline  Atrovent   Cymbalta  Prilosec  Flomax  Aspirin   Metformin  Atorvastatin  Metamucil  Plavix     Past Medical History:    Aortic root dilation  Lumbar back pain  TIA  Anemia due to blood loss  Diarrhea  Diabetic ulcer of left foot  Generalized muscle weakness  Balance problems  Aortic stenosis  Hypertension  CAD  Cardiomyopathy  Carotid artery stenosis  Carotid occlusion, left "   Benign non-nodular prostatic hyperplasia   Diabetes  GERD  DJD  MI  Hyperlipidemia  Hypertension  Mild Cognitive impairment  Osteopenia  Nephrolithiasis  PAD  Paroxysmal atrial fibrillation  RBBB with anterior fascicular block    Past Surgical History:    Partial foot amputation  Cholecystectomy  EGD  Laser lithotripsy, insert stent  Hernia repair  I&D foot  Rotator cuff repair     Family History:    The patient reports a maternal history of breast cancer and a paternal history of heart failure. The patient denies any other relevant family history.    Social History:  The patient is . He reports former tobacco use, quit date 01/01/1999. The patient reports alcohol use of seven drinks per weeks.      Review of Systems   Cardiovascular: Negative for chest pain.   Musculoskeletal: Negative for neck pain.   Neurological: Negative for weakness and headaches.   Psychiatric/Behavioral: Positive for confusion (per wife).   All other systems reviewed and are negative.    Physical Exam   First Vitals:  BP: (!) 144/104  Pulse: 76  Heart Rate: 76  Temp: 97.7  F (36.5  C)  Resp: 16  SpO2: 97 %    Physical Exam  Nursing note and vitals reviewed.  Constitutional:  Awake but confused. Vital signs are normal.   HENT:   Mouth/Throat:   Oropharynx is clear and moist and mucous membranes are normal.   Eyes:    Conjunctivae are normal.      Pupils are equal, round, and reactive to light.   Neck:    Trachea normal and normal range of motion.   Cardiovascular:  Normal rate, regular rhythm and normal heart sounds.    Pulses:   Radial pulses are 2+ bilaterally. Dorsalis pedis pulses are 2+ bilaterally.   Pulmonary/Chest:  Effort normal.   Abdominal:   Soft. Normal appearance and bowel sounds are normal.      Exhibits no distension. There is no tenderness.      There is no rebound and no CVA tenderness.   Musculoskeletal:  Extremities atraumatic x 4.   Lymphadenopathy:  No cervical adenopathy.   Neurological:   Awake but slightly  confused. No focal weakness in any of his limbs. Cranial nerves appear intact. Neglect present on the left with difficulty with finger-to-nose on the left. Sensation appears to be decreased on the left and normal on the right.   Skin:    Skin is warm, dry and intact.      No rash noted.   Psychiatric:   Agitated.    Emergency Department Course   ECG:  Indication: Confusion   Findings: Normal sinus rhythm. Left axis deviation. Right bundle branch block. ECG read at 11:32 by Dr. Oliver.  Ventricular rate: 73 bpm  AR Interval: 172 ms  QRS duration: 140 ms  QT/Qtc: 434/478  R-wave axis: -77     Imaging:  Radiographic findings were communicated with the patient and family who voiced understanding of the findings.    CTA Head, Neck:  IMPRESSION:  1. Chronic occlusion of the left internal carotid artery from the origin to the terminus again noted without change. 2. Moderate atherosclerotic narrowing of the distal P2 segment of the left posterior cerebral artery again noted without change. 3. Atherosclerotic plaque at the origin of the right internal carotid artery resulting in less than 50% luminal diameter stenosis again noted. 4. No evidence for intracranial cerebral artery occlusion to explain the patient's recent symptoms. 5. Overall, no change from the comparison study. Report per radiology.     CT Head w/ contrast:   IMPRESSION: There is a large chronic infarct at the anterolateral aspect of the left frontal lobe. There are no other perfusion defects. Report per radiology.     CT Head w/o contrast:  IMPRESSION: Moderate-sized chronic left frontal infarct again noted without change. Diffuse cerebral volume loss and cerebral white matter changes consistent with chronic small vessel ischemic disease. No evidence for acute intracranial pathology. Report per radiology.       Laboratory:  BMP: Potassium 5.4 (high), Glucose 222 (high), o/w WNL (Creatinine 0.73)  CBC: WNL (WBC 9.6, HGB 13.7, )  INR: 0.99  PTT:  32    Interventions:  Aspirin suppository, 300 mg    Emergency Department Course:  Nursing notes and vitals reviewed. I performed an exam of the patient as documented above.    IV inserted and blood drawn. The patient was placed on continuous cardiac monitoring and pulse oximetry.   11:29 Discussed the case with Dr. Chaney, on call for neurology.   11:44 Preliminary CT scans obtained.  Results as above.  Findings discussed with the patient and family.   12:00 Additional history obtained upon discussion with the patient's wife.   12:19 The patient was given rectal Aspirin, dosage as noted above.    12:22 Discussed the case with Dr. Tidwell, on call for hospitalist services.   Findings and plan explained to the Patient and significant other who consents to admission. Discussed the patient with Dr. Tidwell, who will admit the patient for further monitoring, evaluation, and treatment.     Impression & Plan    CMS Diagnoses: The patient has stroke symptoms:           ED Stroke specific documentation           NIHSS PDF          Protocol PDF     Patient last known well time: Yesterday evening  ED Provider first to bedside at: 11:20 AM  CT Results received at: 11:45 AM  Patient was not treated with TPA due to the following reason(s):  Out of the treatment time window      National [employer] Stroke Scale (Baseline)  Time Performed: 11:20 AM      Score    Level of consciousness: (0)   Alert, keenly responsive    LOC questions: (1)   Answers one question correctly    LOC commands: (1)   Performs one task correctly    Best gaze: (0)   Normal    Visual: (1)   Partial hemianopia    Facial palsy: (0)   Normal symmetrical movements    Motor arm (left): (0)   No drift    Motor arm (right): (0)   No drift    Motor leg (left): (0)   No drift    Motor leg (right): (0)   No drift    Limb ataxia: (1)   Present in one limb    Sensory: (1)   Mild to moderate sensory loss    Best language: (0)   Normal- no aphasia     Dysarthria: (0)   Normal    Extinction and inattention: (1)   Visual, tacile, auditory, spatial, person inattention        Total Score:  6        Stroke Mimics were considered (including migraine headache, seizure disorder, hypoglycemia (or hyperglycemia), head or spinal trauma, CNS infection, Toxin ingestion and shock state (e.g. sepsis) .      Medical Decision Making:  Dustin Pearce is an 89 year old male who came in by ambulance for further evaluation of an apparent stroke. Initially, the paramedics indicated that his last known normal time was at 8:45 AM, which was within the three hour window. However, his significant other arrived later and indicated that his last known normal was last night. In fact, when he woke up at 5-5:30, he was not actually normal. Regardless, I had already initiated the code stroke protocol. He does not have any large vessel emboli that can be identified on CT. The perfusion study does not show anything acute either. However, he does appear to clearly have an acute stroke. Dr. Chaney, the on call neurologist, came and saw the patient and was in agreement. He was provided rectal Asprin per her protocol. I then spoke with Dr. Tidwell, who will be admitting him to the 7th floor      Critical Care time: was 30 minutes for this patient excluding procedures.    Diagnosis:    ICD-10-CM    1. Cerebrovascular accident (CVA), unspecified mechanism (H) I63.9        Disposition:  Admitted under Dr. Tidwell.         Rachid BOYD, am serving as a scribe at 11:20 AM on 12/31/2017 to document services personally performed by Santhosh Oliver MD, based on my observations and the provider's statements to me.        Santhosh Oliver MD  12/31/17 [id number]

## 2017-12-31 NOTE — PROVIDER NOTIFICATION
Spoke to MD (Dr. Tidwell) regarding pt's K+ of 5.4;/Order received to recheck it now. Also, clarified regarding pt's Lantus order while being NPO; order received to give 5 units after rechecking BGM.

## 2017-12-31 NOTE — PLAN OF CARE
Problem: Patient Care Overview  Goal: Plan of Care/Patient Progress Review  Discharge Planner SLP   Patient plan for discharge: Unknown at this time.   Current status: Bedside swallow evaluation completed. Patient presents with mild oral and pharyngeal dysphagia at bedside secondary to probable stroke or TIA, MRI is pending. Patient is known to this department with an OP video swallow study completed 11/16/16, that revealed minimal to mild oral/pharyngeal dysphagia with minimal flash penetration by straw with thin liquids, none by the cup. Today he is slow to respond to questions and confused at bedside. He demonstrated mild holding of the thin liquids with slight delay but no overt Sx of aspiration via the spoon or cup. He tolerated pudding with mildly reduced AP movement of the bolus and minimal oral residue on the soft palate. Mastication was sufficient for solids mildly reduced propulsion of the bolus and 2 swallows to clear. No overt Sx of aspiration on small amount consumed. Recommend: 1. Dysphagia Diet level 3 with thin liquids. 2. Upright, no straws, initial supervision/assistance, small bites/sips and alternate liquids/solids. Crush medium to large pills. Small may be taken with nectar thick liquids if difficulty with water.   Barriers to return to prior living situation: Cognitive linguistic and mobility issues.   Recommendations for discharge: TCU/ARC pending further work up.   Rationale for recommendations: Continue ST for swallowing and completing a cognitive linguistic evaluation.        Entered by: Chloe Mclain 12/31/2017 4:39 PM

## 2017-12-31 NOTE — PHARMACY-ADMISSION MEDICATION HISTORY
Admission medication history interview status for the 12/31/2017  admission is complete. See EPIC admission navigator for prior to admission medications     Medication history source reliability:Good    Actions taken by pharmacist (provider contacted, etc): Interviewed patient's wife, wife had complete medication list with her.     Additional medication history information not noted on PTA med list :None    Medication reconciliation/reorder completed by provider prior to medication history? No    Time spent in this activity: 20 minutes    Prior to Admission medications    Medication Sig Last Dose Taking? Auth Provider   HYDROcodone-acetaminophen (NORCO) 5-325 MG per tablet Take 1 tablet by mouth every 4 hours as needed for moderate to severe pain  Yes Unknown, Entered By History   METOPROLOL SUCCINATE ER PO Take 12.5 mg by mouth daily 12/30/2017 at Unknown time Yes Unknown, Entered By History   LISINOPRIL PO Take 2.5 mg by mouth daily  12/30/2017 at Unknown time Yes Reported, Patient   metoprolol (TOPROL-XL) 25 MG 24 hr tablet Take 0.5 tablets (12.5 mg) by mouth daily 12/30/2017 at Unknown time Yes Chrissy Padgett PA-C   glipiZIDE (GLUCOTROL) 10 MG tablet Take 1 tablet (10 mg) by mouth 2 times daily (before meals) 12/30/2017 at Unknown time Yes Matt Morrissey MD   AMITRIPTYLINE HCL PO Take 25 mg by mouth nightly as needed for sleep 12/30/2017 at Unknown time Yes Unknown, Entered By History   ipratropium (ATROVENT) 0.03 % spray Spray 2 sprays into both nostrils every 12 hours 12/30/2017 at Unknown time Yes Unknown, Entered By History   DULoxetine (CYMBALTA) 30 MG EC capsule Take 1 capsule (30 mg) by mouth daily 12/30/2017 at Unknown time Yes Matt Morrissey MD   omeprazole (PRILOSEC) 40 MG capsule Take 1 capsule (40 mg) by mouth daily Take 30-60 minutes before a meal. 12/30/2017 at Unknown time Yes Matt Morrissey MD   tamsulosin (FLOMAX) 0.4 MG capsule Take 1 capsule (0.4 mg) by mouth daily 12/30/2017  at Unknown time Yes Matt Morrissey MD   aspirin EC 81 MG EC tablet Take 1 tablet (81 mg) by mouth daily 12/30/2017 at Unknown time Yes Tra Reynoso MD   METFORMIN HCL PO Take 1,000 mg by mouth 2 times daily (with meals) 12/30/2017 at Unknown time Yes Reported, Patient   ATORVASTATIN CALCIUM PO Take 80 mg by mouth daily 12/30/2017 at Unknown time Yes Reported, Patient   blood glucose monitoring (NO BRAND SPECIFIED) test strip Use to test blood sugar three times daily or as directed.  Yes Matt Morrissey MD   order for DME Equipment being ordered: [employer] Blood Glucose meter.  Yes Matt Morrissey MD   Psyllium (METAMUCIL PO) Take 1 packet by mouth daily as needed  12/30/2017 at Unknown time Yes Reported, Patient   Clopidogrel Bisulfate, [id number], (PLAVIX PO) Take 75 mg by mouth daily  12/30/2017 at Unknown time Yes Reported, Patient

## 2017-12-31 NOTE — IP AVS SNAPSHOT
"[place] [address] [facility]: [phone]                                              INTERAGENCY TRANSFER FORM - PHYSICIAN ORDERS   2017                    Hospital Admission Date: 2017  SID AGUIAR   : 1928  Sex: Male        Attending Provider: Meseret Tidwell MD     Allergies:  Oxycodone, Sulfa Drugs, Tetracycline    Infection:  None   Service:  HOSPITALIST    Ht:  1.727 m (5' 8\")   Wt:  74.4 kg (164 lb 0.4 oz)   Admission Wt:  74.7 kg (164 lb 11.2 oz)    BMI:  24.94 kg/m 2   BSA:  1.89 m 2            Patient PCP Information     Provider PCP Type    Matt Morrissey MD General    Matt Morrissey MD Internal Medicine      ED Clinical Impression     Diagnosis Description Comment Added By Time Added    TIA involving right internal carotid artery [G45.1] TIA involving right internal carotid artery [G45.1]  Anna Velasquez APRN CNP 2018  9:03 AM    Carotid stenosis, symptomatic w/o infarct, right [I65.21] Carotid stenosis, symptomatic w/o infarct, right [I65.21]  Anna Velasquez APRN CNP 2018  9:03 AM    Essential hypertension [I10] Essential hypertension [I10]  Anna Velasquez APRN CNP 2018  9:03 AM    Type 2 diabetes mellitus with other specified complication, without long-term current use of insulin (H) [E11.69] Type 2 diabetes mellitus with other specified complication, without long-term current use of insulin (H) [E11.69]  Anna Velasquez APRN CNP 2018  9:05 AM    Neck pain [M54.2] Neck pain [M54.2]  Gagan Jerome MD 2018  2:59 PM    Vitamin D deficiency [E55.9] Vitamin D deficiency [E55.9]  Gagan Jerome MD 2018  3:10 PM      Hospital Problems as of 2018              Priority Class Noted POA    Stroke of unknown etiology (H) Medium  2017 Yes    Carotid stenosis Medium  1/3/2018 Yes      Non-Hospital Problems as of 2018              Priority Class Noted    Coronary artery disease involving native " coronary artery of native heart without angina pectoris Medium  10/20/2016    Mild cognitive impairment Medium  10/20/2016    Hyperlipidemia LDL goal <70 Medium  10/20/2016    Benign non-nodular prostatic hyperplasia with lower urinary tract symptoms Medium  10/20/2016    Gastroesophageal reflux disease without esophagitis Medium  10/20/2016    Cerebrovascular accident (H) Medium  10/20/2016    Carotid artery stenosis Medium  10/20/2016    Abdominal aortic aneurysm (H) Medium  12/25/2016    Lumbar back pain Medium  12/30/2016    Benign essential hypertension Medium  1/6/2017    TIA (transient ischemic attack) Medium  1/20/2017    Paroxysmal atrial fibrillation (H) Medium  Unknown    Ischemic cardiomyopathy Medium  Unknown    Aortic root dilatation (H) Medium  Unknown    Physical deconditioning Medium  6/12/2017    Diabetic ulcer of left foot associated with diabetes mellitus due to underlying condition (H) Medium  6/12/2017    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) Medium  6/12/2017    Anemia due to blood loss, acute Medium  6/13/2017    Diarrhea, unspecified type Medium  6/16/2017    Nausea Medium  6/21/2017    Acute pain of left shoulder Medium  8/7/2017    Balance problems Medium  8/7/2017    Generalized muscle weakness Medium  8/7/2017    PAD (peripheral artery disease) (H) Medium  Unknown    Aortic stenosis Medium  Unknown    RBBB with left anterior fascicular block Medium  Unknown      Code Status History     Date Active Date Inactive Code Status Order ID Comments User Context    1/4/2018  3:04 PM  Full Code [id number]  Gagan Jerome MD Outpatient    12/31/2017  1:57 PM 1/4/2018  3:04 PM Full Code [id number]  Meseret Tidwell MD Inpatient    6/9/2017 10:07 AM 12/31/2017  1:57 PM Full Code [id number]  Faustino Aguilar MD Outpatient    5/31/2017  8:56 PM 6/9/2017 10:07 AM Full Code [id number]  Blaze Torres MD Inpatient    1/21/2017  3:57 PM 5/31/2017  8:56 PM Full Code [id number]  Edgardo  Tra Rouse MD Outpatient    1/20/2017  1:55 PM 1/21/2017  3:57 PM Full Code [id number]  Blaze Rose MD Inpatient    12/28/2016  1:26 PM 1/20/2017  1:55 PM Full Code [id number]  Paloma Chen MD Outpatient    12/25/2016  3:13 AM 12/28/2016  1:26 PM Full Code [id number]  Faustino Aguilar MD Inpatient    3/2/2012 11:45 AM 12/25/2016  3:13 AM Full Code [id number]  Wilmar Medrano MD Outpatient         Medication Review      START taking        Dose / Directions Comments    cholecalciferol 65495 UNITS capsule   Commonly known as:  VITAMIN D3   Used for:  Vitamin D deficiency        Dose:  1 capsule   Take 1 capsule (50,000 Units) by mouth once a week   Quantity:  12 capsule   Refills:  0        exenatide 5 MCG/0.02ML Sopn injection   Commonly known as:  BYETTA   Used for:  Type 2 diabetes mellitus with other specified complication, without long-term current use of insulin (H)        Dose:  5 mcg   Inject 5 mcg Subcutaneous 2 times daily (before meals)   Quantity:  1 Syringe   Refills:  0          CONTINUE these medications which may have CHANGED, or have new prescriptions. If we are uncertain of the size of tablets/capsules you have at home, strength may be listed as something that might have changed.        Dose / Directions Comments    HYDROcodone-acetaminophen 5-325 MG per tablet   Commonly known as:  NORCO   This may have changed:    - when to take this  - reasons to take this   Used for:  Neck pain        Dose:  1 tablet   Take 1 tablet by mouth every 6 hours as needed for moderate to severe pain (PRN for post surgical pain.)   Quantity:  12 tablet   Refills:  0        metoprolol 25 MG 24 hr tablet   Commonly known as:  TOPROL-XL   Indication:  High Blood Pressure Disorder   This may have changed:  Another medication with the same name was removed. Continue taking this medication, and follow the directions you see here.   Used for:  Cardiomyopathy, unspecified type (H)        Dose:  12.5 mg    Take 0.5 tablets (12.5 mg) by mouth daily   Refills:  0          CONTINUE these medications which have NOT CHANGED        Dose / Directions Comments    AMITRIPTYLINE HCL PO        Dose:  25 mg   Take 25 mg by mouth nightly as needed for sleep   Refills:  0        aspirin 81 MG EC tablet   Used for:  Transient cerebral ischemia, unspecified type        Dose:  81 mg   Take 1 tablet (81 mg) by mouth daily   Quantity:  30 tablet   Refills:  0        ATORVASTATIN CALCIUM PO        Dose:  80 mg   Take 80 mg by mouth daily   Refills:  0        blood glucose monitoring test strip   Commonly known as:  no brand specified   Used for:  Hypoglycemia        Use to test blood sugar three times daily or as directed.   Quantity:  300 each   Refills:  3    True Metrix strips or brand desired by insurance       DULoxetine 30 MG EC capsule   Commonly known as:  CYMBALTA   Used for:  Polyneuropathy associated with underlying disease (H)        Dose:  30 mg   Take 1 capsule (30 mg) by mouth daily   Quantity:  90 capsule   Refills:  3        glipiZIDE 10 MG tablet   Commonly known as:  GLUCOTROL   Used for:  Type 2 diabetes mellitus with diabetic peripheral angiopathy without gangrene, without long-term current use of insulin (H)        Dose:  10 mg   Take 1 tablet (10 mg) by mouth 2 times daily (before meals)   Quantity:  180 tablet   Refills:  3        ipratropium 0.03 % spray   Commonly known as:  ATROVENT        Dose:  2 spray   Spray 2 sprays into both nostrils every 12 hours   Refills:  0        LISINOPRIL PO        Dose:  2.5 mg   Take 2.5 mg by mouth daily   Refills:  0        METAMUCIL PO        Dose:  1 packet   Take 1 packet by mouth daily as needed   Refills:  0        METFORMIN HCL PO        Dose:  1000 mg   Take 1,000 mg by mouth 2 times daily (with meals)   Refills:  0        omeprazole 40 MG capsule   Commonly known as:  priLOSEC   Used for:  Other acute gastritis without hemorrhage        Dose:  40 mg   Take 1 capsule  (40 mg) by mouth daily Take 30-60 minutes before a meal.   Quantity:  90 capsule   Refills:  3        order for DME   Used for:  Type 2 diabetes mellitus without complication (H)        Equipment being ordered: True Matrix Blood Glucose meter.   Quantity:  1 Device   Refills:  0        PLAVIX PO   Used for:  Cough        Dose:  75 mg   Take 75 mg by mouth daily   Refills:  0        tamsulosin 0.4 MG capsule   Commonly known as:  FLOMAX   Used for:  Benign non-nodular prostatic hyperplasia with lower urinary tract symptoms        Dose:  0.4 mg   Take 1 capsule (0.4 mg) by mouth daily   Quantity:  90 capsule   Refills:  3                  Further instructions from your care team       Carotid Endartectomy  Post-Operative Instructions    Typical length of stay in the hospital is 1-2 days.  On occasion, an evening in the Intensive Care Unit may be required for blood pressure regulation.    Activity    Most people are able to resume normal activities 1-2 weeks after surgery.  The key is to gradually increase your level of activity as you are able.  It is not uncommon to feel easily fatigued - this will improve with time.      You should avoid heavy lifting more than 30 lbs for 1 week.      You may return to work when you feel able - typically 1-2 weeks after surgery, depending on the type of work you do.      You should not drive a car for 1 week after surgery.  In addition,  you can not be taking prescription strength pain medication and you must feel strong enough to drive safely.    Incision Care    You can shower or bathe 2 days after surgery - avoid rubbing and soaking your incision.      You may have strips of white tape called  steri-strips  across your incision; they should stay on for 5-7 days or until the ends start to curl up.  You can then remove the steri-strips, or we will remove them at your post-operative appointment.      Avoid shaving directly over the incision for 2-3 weeks.    Common Concerns    You  may notice mild skin numbness on the side of your surgery in your neck, chin, face, and earlobe.  This is due to nerve  irritation  and will gradually resolve over the next several months.  Call our office if you experience any short-lasting episode of numbness or weakness affecting one side of your body.      Some bruising and firmness around you incision can be expected.  This will soften and resolve over the next few weeks.  You may notice that some discoloration spreads to an area lower than the incision, such as the shoulder, neck or upper chest.  Likewise, swelling can occur near the incision or below the chin.  Call our office if the swelling or bruising worsens, or if you have difficulty swallowing.    Diet    You may resume a regular diet before you leave the hospital.  You may find that it is best to try smaller, more frequent meals until your appetite returns.    Medications    You may be started on a blood thinner after surgery - typically Aspirin and / or Plavix.      You may be given a prescription for pain medication after surgery.  If you need to have this refilled, please call your pharmacy where you had it filled.  They will then call us for approval.  Please do not call after hours for a refill on pain medication.    Post-Operative Appointments    You need to see your surgeon in the clinic approximately 1-2 weeks after surgery.  Post-operative appointment:   Date: _____________________________  Time: ____________________________  Dr. ______________________________      You will have an ultrasound test and evaluation with your surgeon 90 days (3 months) after surgery.      We will notify you by mail when you are due to schedule further ultrasound testing and evaluation; typically this is done annually.     When You Should Call Our Office    Body aches, chills or temperature greater than 101      Incision redness or drainage      Loss of vision in one eye      Loss of strength / weakness in one side  of your body      Severe headache      Difficulty with speech      If you will be having an invasive procedure, such as a colonoscopy or dental work within one year of your surgery, you may need to take an antibiotic prior to the procedure if you had a Dacron patch with your surgery; please call us so we can assist you with this.    Call our main number, [phone], for assistance with any concerns or questions.     Revised 5/2014    Summary of Visit     Reason for your hospital stay       TIA, Rt carotid stenosis.             After Care     Activity       Your activity upon discharge: activity as tolerated.  May shower.       Advance Diet as Tolerated       Follow this diet upon discharge: Orders Placed This Encounter      Diet      Regular Diet Adult       Diet       Follow this diet upon discharge: Orders Placed This Encounter      Room Service      Advance Diet as Tolerated: Regular       General info for SNF       Length of Stay Estimate: Short Term Care: Estimated # of Days <30  Condition at Discharge: Improving  Level of care:skilled   Rehabilitation Potential: Good  Admission H&P remains valid and up-to-date: Yes  Recent Chemotherapy: N/A  Use Nursing Home Standing Orders: Yes       Mantoux instructions       Give two-step Mantoux (PPD) Per Facility Policy Yes             Referrals     Occupational Therapy Adult Consult       Evaluate and treat as clinically indicated.    Reason:  TIA       Physical Therapy Adult Consult       Evaluate and treat as clinically indicated.    Reason:  TIA             Follow-Up Appointment Instructions     Future Labs/Procedures    Follow Up and recommended labs and tests     Comments:    Follow up with long-term physician.  The following labs/tests are recommended: BMP in 3-5 days to follow up on potassium.    Follow-up and recommended labs and tests      Comments:    Follow up with me,  Roland Rodriguez, within 2 weeks. to evaluate after surgery.  The following  labs/tests are recommended: Carotid Duplex in 4 weeks..    Outpatient neurology visit in 4-6 weeks for TIA follow up.      Follow-Up Appointment Instructions     Follow Up and recommended labs and tests       Follow up with jail physician.  The following labs/tests are recommended: BMP in 3-5 days to follow up on potassium.       Follow-up and recommended labs and tests        Follow up with me,  Roland Rodrigeuz, within 2 weeks. to evaluate after surgery.  The following labs/tests are recommended: Carotid Duplex in 4 weeks..    Outpatient neurology visit in 4-6 weeks for TIA follow up.             Statement of Approval     Ordered          01/04/18 1605  I have reviewed and agree with all the recommendations and orders detailed in this document.  EFFECTIVE NOW     Approved and electronically signed by:  Gagan Jerome MD

## 2017-12-31 NOTE — IP AVS SNAPSHOT
` [place] [id number] [facility]: [phone]            Medication Administration Report for Dustin Pearce as of 01/04/18 1648   Legend:    Given Hold Not Given Due Canceled Entry Other Actions    Time Time (Time) Time  Time-Action       Inactive    Active    Linked        Medications 12/29/17 12/30/17 12/31/17 01/01/18 01/02/18 01/03/18 01/04/18    0.9% sodium chloride infusion  Rate: 70 mL/hr Freq: CONTINUOUS Route: IV  Start: 01/03/18 2245   Admin Instructions: Saline lock when taking PO fluids.          2238 ( )-New Bag            acetaminophen (TYLENOL) tablet 650 mg  Dose: 650 mg Freq: EVERY 4 HOURS PRN Route: PO  PRN Reason: other  PRN Comment: surgical pain  Start: 01/03/18 2232   Admin Instructions: May give first dose 4 hours after last scheduled dose acetaminophen.  Maximum acetaminophen dose from all sources = 75 mg/kg/day not to exceed 4 grams/day.           0640 (650 mg)-Given           aspirin EC EC tablet 325 mg  Dose: 325 mg Freq: DAILY Route: PO  Start: 12/31/17 1400   Admin Instructions: On admission and daily.               0917 (325 mg)-Given        0849 (325 mg)-Given        0845 (325 mg)-Given       1448-Auto Hold       2225-Unhold        0857 (325 mg)-Given          Or  aspirin Suppository 300 mg  Dose: 300 mg Freq: DAILY Route: RE  Start: 12/31/17 1400   Admin Instructions: On admission and daily. If unable to take PO.       (1450)-Not Given [C]                               1448-Auto Hold       2225-Unhold                   atorvastatin (LIPITOR) tablet 80 mg  Dose: 80 mg Freq: DAILY Route: PO  Start: 12/31/17 1730      1750 (80 mg)-Given        0918 (80 mg)-Given        0849 (80 mg)-Given        0845 (80 mg)-Given       1448-Auto Hold       2225-Unhold        0856 (80 mg)-Given           benzocaine-menthol (CHLORASEPTIC) 6-10 MG lozenge 1-2 lozenge  Dose: 1-2 lozenge Freq: EVERY 1 HOUR PRN Route: BU  PRN Reason: sore throat  PRN Comment: without fever  Start:  01/03/18 2232              DULoxetine (CYMBALTA) EC capsule 30 mg  Dose: 30 mg Freq: DAILY Route: PO  Start: 12/31/17 1730      1750 (30 mg)-Given        0918 (30 mg)-Given        0849 (30 mg)-Given        0845 (30 mg)-Given       1448-Auto Hold       2225-Unhold        0857 (30 mg)-Given           exenatide (BYETTA) 5 mcg/0.02mL per dose (60 doses per 1.2 mL prefilled pen)  Dose: 5 mcg Freq: 2 TIMES DAILY BEFORE MEALS Route: SC  Start: 01/03/18 1630   Admin Instructions: Administer within a 60 minute period prior to meal. Do not administer after the meal. For SQ use only. *Refrigerate*.          1448-Auto Hold       1630-Automatically Held       2225-Unhold        0900 (5 mcg)-Given       1629 (5 mcg)-Given           glucose 40 % gel 15-30 g  Dose: 15-30 g Freq: EVERY 15 MIN PRN Route: PO  PRN Reason: low blood sugar  Start: 12/31/17 1357   Admin Instructions: Give 15 g for BG 51 to 69 mg/dL IF patient is conscious and able to swallow. Give 30 g for BG less than or equal to 50 mg/dL IF patient is conscious and able to swallow. Do NOT give glucose gel via enteral tube.  IF patient has enteral tube: give apple juice 120 mL (4 oz or 15 g of CHO) via enteral tube for BG 51 to 69 mg/dL.  Give apple juice 240 mL (8 oz or 30 g of CHO) via enteral tube for BG less than or equal to 50 mg/dL.    ~Oral gel is preferable for conscious and able to swallow patient.   ~IF gel unavailable or patient refuses may provide apple juice 120 mL (4 oz or 15 g of CHO). Document juice on I and O flowsheet.          1448-Auto Hold       2225-Unhold           Or  dextrose 50 % injection 25-50 mL  Dose: 25-50 mL Freq: EVERY 15 MIN PRN Route: IV  PRN Reason: low blood sugar  Start: 12/31/17 1357   Admin Instructions: Use if have IV access, BG less than 70 mg/dL and meet dose criteria below:  Dose if conscious and alert (or disorientated) and NPO = 25 mL  Dose if unconscious / not alert = 50 mL  Vesicant. For ordered doses up to 25 mg, give IV  Push undiluted. Give each 5g over 1 minute.          1448-Auto Hold       2225-Unhold           Or  glucagon injection 1 mg  Dose: 1 mg Freq: EVERY 15 MIN PRN Route: SC  PRN Reason: low blood sugar  PRN Comment: May repeat x 1 only  Start: 12/31/17 1357   Admin Instructions: May give SQ or IM. ONLY use glucagon IF patient has NO IV access AND is UNABLE to swallow AND blood glucose is LESS than or EQUAL to 50 mg/dL.  Give IV Push over 1 minute. Reconstitute with 1mL sterile water.          1448-Auto Hold       2225-Unhold            heparin lock flush 10 UNIT/ML injection 5-10 mL  Dose: 5-10 mL Freq: EVERY 1 HOUR PRN Route: IK  PRN Reason: other  PRN Comment: to lock each CVC - Open Ended (Tunneled and Non-Tunneled) dormant lumen.  Start: 01/04/18 0456   Admin Instructions: MAX: 5 mL per lumen.               heparin lock flush 10 UNIT/ML injection 5-10 mL  Dose: 5-10 mL Freq: EVERY 24 HOURS Route: IK  Start: 01/04/18 0600   Admin Instructions: To lock each CVC - Open Ended (Tunneled and Non-Tunneled) dormant lumen. Check PRN heparin flush order to see when last dose of PRN heparin was given before administering.  MAX: 5 mL per lumen..           0643 (10 mL)-Given [C]           insulin aspart (NovoLOG) inj (RAPID ACTING)  Dose: 1-5 Units Freq: AT BEDTIME Route: SC  Start: 12/31/17 2200   Admin Instructions: MEDIUM INSULIN RESISTANCE DOSING    Do Not give Bedtime Correction Insulin if BG less than  200.   For  - 249 give 1 units.   For  - 299 give 2 units.   For  - 349 give 3 units.   For  -399 give 4 units.   For BG greater than or equal to 400 give 5 units.  Notify provider if glucose greater than or equal to 350 mg/dL after administration of correction dose.  If given at mealtime, must be administered 5 min before meal or immediately after.       (2140)-Not Given [C]        (2306)-Not Given        2153 (2 Units)-Given        1448-Auto Hold       2200-Automatically Held       2225-Unhold         [ ] 2200           insulin aspart (NovoLOG) inj (RAPID ACTING)  Dose: 1-7 Units Freq: 3 TIMES DAILY BEFORE MEALS Route: SC  Start: 12/31/17 1730   Admin Instructions: Correction Scale - MEDIUM INSULIN RESISTANCE DOSING     Do Not give Correction Insulin if Pre-Meal BG less than 140.   For Pre-Meal  - 189 give 1 unit.   For Pre-Meal  - 239 give 2 units.   For Pre-Meal  - 289 give 3 units.   For Pre-Meal  - 339 give 4 units.   For Pre-Meal - 399 give 5 units.   For Pre-Meal -449 give 6 units  For Pre-Meal BG greater than or equal to 450 give 7 units.   To be given with prandial insulin, and based on pre-meal blood glucose.    Notify provider if glucose greater than or equal to 350 mg/dL after administration of correction dose.  If given at mealtime, must be administered 5 min before meal or immediately after.       1754 (1 Units)-Given [C]        0918 (1 Units)-Given       1321 (1 Units)-Given       1809 (1 Units)-Given        (0845)-Not Given       1311 (2 Units)-Given       1806 (3 Units)-Given        (0845)-Not Given       1323 (2 Units)-Given       1448-Auto Hold       1700-Automatically Held       2225-Unhold        0949 (1 Units)-Given [C]       1239 (2 Units)-Given [C]       1643 (1 Units)-Given           insulin glargine (LANTUS) injection 12 Units  Dose: 12 Units Freq: DAILY Route: SC  Start: 01/04/18 0900         1448-Auto Hold       2225-Unhold        0900 (12 Units)-Given           ipratropium (ATROVENT) 0.03 % spray 2 spray  Dose: 2 spray Freq: EVERY 12 HOURS Route: BOTH NOSTRIL  Start: 12/31/17 1400      1722 (2 spray)-Given        0923 (2 spray)-Given       1811 (2 spray)-Given        0734 (2 spray)-Given       1903 (2 spray)-Given        0545 (2 spray)-Given       1448-Auto Hold       1800-Automatically Held       2225-Unhold        0655 (2 spray)-Given       [ ] 1800           lidocaine (LMX4) cream  Freq: EVERY 1 HOUR PRN Route: Top  PRN Reason: pain  PRN  "Comment: with VAD insertion or accessing implanted port.  Start: 01/03/18 2232   Admin Instructions: Do NOT give if patient has a history of allergy to any local anesthetic or any \"jonh\" product.   Apply 30 minutes prior to VAD insertion or port access.  MAX Dose:  2.5 g (  of 5 g tube)               lidocaine (LMX4) cream  Freq: EVERY 1 HOUR PRN Route: Top  PRN Reason: pain  PRN Comment: with VAD insertion or accessing implanted port.  Start: 12/31/17 1356   Admin Instructions: Do NOT give if patient has a history of allergy to any local anesthetic or any \"jonh\" product.   Apply 30 minutes prior to VAD insertion or port access.  MAX Dose:  2.5 g (  of 5 g tube)          1448-Auto Hold       2225-Unhold            Medication Instruction  Freq: CONTINUOUS PRN Route: XX  Start: 12/31/17 1356   Admin Instructions: No D5W IV solutions unless patient hypoglycemic.  Pharmacy to remove all dextrose from iv fluid orders as able.               Medication Instruction  Freq: CONTINUOUS PRN Route: XX  Start: 12/31/17 1356   Admin Instructions: No D5W IV solutions unless patient hypoglycemic.  Pharmacy to remove all dextrose from iv fluid orders as able.               metoprolol (TOPROL-XL) half-tab 12.5 mg  Dose: 12.5 mg Freq: DAILY Route: PO  Indications of Use: HYPERTENSION  Start: 12/31/17 1730   Admin Instructions: DO NOT CRUSH. Tablet may be split in half along score line.  Hold if SBP< 110 or HR< 65       1753 (12.5 mg)-Given        0917 (12.5 mg)-Given        0848 (12.5 mg)-Given        0845 (12.5 mg)-Given       1448-Auto Hold       2225-Unhold        (0856)-Not Given           naloxone (NARCAN) injection 0.1-0.4 mg  Dose: 0.1-0.4 mg Freq: EVERY 2 MIN PRN Route: IV  PRN Reason: opioid reversal  Start: 12/31/17 1356   Admin Instructions: For respiratory rate LESS than or EQUAL to 8.  Partial reversal dose:  0.1 mg titrated q 2 minutes for Analgesia Side Effects Monitoring Sedation Level of 3 (frequently drowsy, " arousable, drifts to sleep during conversation).Full reversal dose:  0.4 mg bolus for Analgesia Side Effects Monitoring Sedation Level of 4 (somnolent, minimal or no response to stimulation).  For ordered doses up to 2mg give IVP. Give each 0.4mg over 15 seconds in emergency situations. For non-emergent situations further dilute in 9mL of NS to facilitate titration of response.          1448-Auto Hold       2225-Unhold            omeprazole (priLOSEC) CR capsule 40 mg  Dose: 40 mg Freq: EVERY MORNING BEFORE BREAKFAST Route: PO  Start: 01/03/18 0915         0915 (40 mg)-Given       1448-Auto Hold       2225-Unhold        0640 (40 mg)-Given           ondansetron (ZOFRAN-ODT) ODT tab 4 mg  Dose: 4 mg Freq: EVERY 6 HOURS PRN Route: PO  PRN Reasons: nausea,vomiting  Start: 12/31/17 1357   Admin Instructions: This is Step 1 of nausea and vomiting management.  If nausea not resolved in 15 minutes, go to Step 2 prochlorperazine (COMPAZINE). Do not push through foil backing. Peel back foil and gently remove. Place on tongue immediately. Administration with liquid unnecessary          1448-Auto Hold              2225-Unhold           Or  ondansetron (ZOFRAN) injection 4 mg  Dose: 4 mg Freq: EVERY 6 HOURS PRN Route: IV  PRN Reasons: nausea,vomiting  Start: 12/31/17 1357   Admin Instructions: This is Step 1 of nausea and vomiting management.  If nausea not resolved in 15 minutes, go to Step 2 prochlorperazine (COMPAZINE).  Irritant. For ordered doses up to 4 mg, give IV Push undiluted over 2-5 minutes.          1448-Auto Hold       1916 (4 mg)-Given       2225-Unhold            sodium chloride (PF) 0.9% PF flush 10 mL  Dose: 10 mL Freq: ONCE Route: IV  Start: 01/01/18 1045       (1318)-Not Given         1448-Auto Hold       2225-Unhold            sodium chloride (PF) 0.9% PF flush 3 mL  Dose: 3 mL Freq: EVERY 8 HOURS Route: IK  Start: 01/03/18 2245   Admin Instructions: And Q1H PRN, to lock peripheral IV dormant line.           (2240)-Not Given        (0534)-Not Given       1414 (3 mL)-Given       [ ] 2200           sodium chloride (PF) 0.9% PF flush 3 mL  Dose: 3 mL Freq: EVERY 1 HOUR PRN Route: IK  PRN Reason: line flush  PRN Comment: for peripheral IV flush post IV meds  Start: 01/03/18 2232              tamsulosin (FLOMAX) capsule 0.4 mg  Dose: 0.4 mg Freq: DAILY Route: PO  Start: 12/31/17 1730   Admin Instructions: Administer 30 minutes after the same meal each day.  Capsules should be swallowed whole; do not crush chew or open.       1753 (0.4 mg)-Given        0918 (0.4 mg)-Given        0848 (0.4 mg)-Given        0845 (0.4 mg)-Given       1448-Auto Hold       2225-Unhold        0857 (0.4 mg)-Given           traMADol (ULTRAM) tablet 50 mg  Dose: 50 mg Freq: EVERY 6 HOURS PRN Route: PO  PRN Reason: moderate to severe pain  Start: 01/03/18 2232   Admin Instructions: Hold while on PCA or with regular IV opioid dosing.              Completed Medications  Medications 12/29/17 12/30/17 12/31/17 01/01/18 01/02/18 01/03/18 01/04/18         Dose: 12.5 g Freq: ONCE Route: IV  Start: 01/03/18 2030   End: 01/03/18 2017   Admin Instructions: Infusion rates should be adjusted based on patient condition and response.  - For shock/hypovolemia, infuse as rapidly as tolerated. For patients with cardiac or circulatory disease at risk for rapid blood pressure increases, do not exceed 5-10 mL/min.   - For patients with normal plasma volume, do not exceed 1-2 mL/min to avoid circulatory overload and pulmonary edema.   - For procedure specific rates, please refer to procedure protocol.          2017 (12.5 g)-New Bag              Dose: 600 mg Freq: ONCE Route: RE  Start: 01/03/18 2000   End: 01/03/18 2000   Admin Instructions: In recovery room.          2000 (600 mg)-Given              Dose: 2 g Freq: ONCE Route: IV  Indications of Use: PERIOPERATIVE PHARMACOPROPHYLAXIS  Start: 01/03/18 1615   End: 01/03/18 1640         1601 (2 g)-Handoff       1640 (2  g)-Given              Dose: 3 Units Freq: ONCE Route: SC  Start: 01/03/18 1115   End: 01/03/18 1323         1323 (3 Units)-Given              Dose: 3 Units Freq: ONCE Route: SC  Start: 01/02/18 1030   End: 01/02/18 1311        1311 (3 Units)-Given            Discontinued Medications  Medications 12/29/17 12/30/17 12/31/17 01/01/18 01/02/18 01/03/18 01/04/18         Rate: 10 mL/hr Freq: CONTINUOUS Route: IV  Last Dose: 500 mL (01/03/18 2210)  Start: 01/03/18 2215   End: 01/04/18 0823   Admin Instructions: IF patient on dialysis.          2210 (500 mL)-New Bag        0823-Med Discontinued         Dose: 12.5 g Freq: ONCE Route: IV  Start: 01/03/18 2130   End: 01/03/18 2225   Admin Instructions: Infusion rates should be adjusted based on patient condition and response.  - For shock/hypovolemia, infuse as rapidly as tolerated. For patients with cardiac or circulatory disease at risk for rapid blood pressure increases, do not exceed 5-10 mL/min.   - For patients with normal plasma volume, do not exceed 1-2 mL/min to avoid circulatory overload and pulmonary edema.   - For procedure specific rates, please refer to procedure protocol.                 2225-Med Discontinued          Freq: PRN  Start: 01/03/18 1910   End: 01/03/18 2225         1910 (30 mL)-Given       2225-Med Discontinued          Dose: 75 mg Freq: DAILY Route: PO  Start: 12/31/17 1730   End: 01/02/18 1810      1754 (75 mg)-Given        0918 (75 mg)-Given        0849 (75 mg)-Given       1810-Med Discontinued           Freq: PRN  Start: 01/03/18 1650   End: 01/03/18 2225         1650 (252.5 mL)-Given [C]       2225-Med Discontinued          Dose: 0.2 mg Freq: EVERY 2 HOURS PRN Route: IV  PRN Reason: other  PRN Comment: pain control or improvement in physical function. Hold dose for analgesic side effects.  Start: 01/03/18 2232   End: 01/04/18 0823   Admin Instructions: Start at the lowest dose.  May adjust dose by 0.1 mg every 2 hours as needed.   Notify  provider to assess for uncontrolled pain or analgesic side effects.  Hold while on IV PCA or with regular IV opioid dosing.  For ordered doses up to 4 mg give IV Push undiluted. Administer each 2mg over 2-5 minutes.           0823-Med Discontinued         Dose: 5 Units Freq: DAILY Route: SC  Start: 01/01/18 0900   End: 01/02/18 1018       0918 (5 Units)-Given        0848 (5 Units)-Given       1018-Med Discontinued           Dose: 8 Units Freq: DAILY Route: SC  Start: 01/03/18 0900   End: 01/03/18 1109         0845 (8 Units)-Given       1109-Med Discontinued          Dose: 10 mg Freq: EVERY 10 MIN PRN Route: IV  PRN Reason: high blood pressure  PRN Comment: Systolic Blood Pressure greater than 180 mmHg or Diastolic Blood Pressure greater than 90 mmHg.  Start: 01/03/18 2232   End: 01/04/18 0823   Admin Instructions: Repeat or double dose every 10 minutes up to 300 mg cumulative dose.  For ordered doses up to 80 mg, give IV Push undiluted. Give each 20 mg over 2 minutes.           0823-Med Discontinued         Dose: 10-40 mg Freq: EVERY 10 MIN PRN Route: IV  PRN Reasons: high blood pressure,other  PRN Comment: for Systolic Blood Pressure greater than 200 mmHg or Diastolic Blood Pressure greater than 100 mmHg or Mean Arterial Pressure greater than 100 mmHg  Start: 12/31/17 1356   End: 01/04/18 0823   Admin Instructions: Use if Heart Rate 60 bpm or greater upon antihypertensive initiation.  Hold if Heart Rate less than 60 bpm.    Increase or repeat the dose if Blood Pressure goal not met.   Give 10 mg labetalol, wait 10 minutes.  If not effective then give an additional 10 mg of labetalol.  Wait 10 minutes.  If not effective then give 20 mg of labetalol. Wait 10 minutes.  If not effective give 20 mg of labetalol.  Wait 10 minutes. If not effective then give 40 mg of labetalol.     May add prn hydraLAZINE (APRESOLINE) [if ordered] if Systolic Blood Pressure parameter is not met after reaching labetalol 40 mg dose.      If  "hydrALAZINE (APRESOLINE) not ordered, may use enalaprilat (if ordered)   (MAX daily dose labetalol: 300 mg /24 hrs)   Notify provider within 1 hour if Blood Pressure parameters are not met.  For ordered doses up to 80 mg, give IV Push undiluted. Give each 20 mg over 2 minutes.          1448-Auto Hold       2225-Unhold        0823-Med Discontinued         Rate: 25 mL/hr Freq: CONTINUOUS Route: IV  Start: 01/03/18 2215   End: 01/04/18 0823   Admin Instructions: IF patient NOT on dialysis.                  0823-Med Discontinued         Rate: 25 mL/hr Freq: CONTINUOUS Route: IV  Start: 01/03/18 1615   End: 01/03/18 1937   Admin Instructions: IF patient NOT on dialysis.          1606 ( )-New Bag       1903 ( )-Anesthesia Volume Adjustment       1937-Med Discontinued          Dose: 1 mL Freq: EVERY 1 HOUR PRN Route: OTHER  PRN Comment: mild pain with VAD insertion or accessing implanted port  Start: 01/03/18 2232   End: 01/04/18 0823   Admin Instructions: Do NOT give if patient has a history of allergy to any local anesthetic or any \"jonh\" product. MAX dose 1 mL subcutaneous OR intradermal in divided doses.           0823-Med Discontinued         Dose: 1 mL Freq: EVERY 1 HOUR PRN Route: OTHER  PRN Comment: mild pain with VAD insertion or accessing implanted port  Start: 01/03/18 1603   End: 01/03/18 1937   Admin Instructions: Do NOT give if patient has a history of allergy to any local anesthetic or any \"jonh\" product. MAX dose 1 mL subcutaneous OR intradermal in divided doses.          1937-Med Discontinued          Dose: 1 mL Freq: EVERY 1 HOUR PRN Route: OTHER  PRN Comment: mild pain with VAD insertion or accessing implanted port  Start: 12/31/17 1356   End: 01/04/18 0823   Admin Instructions: Do NOT give if patient has a history of allergy to any local anesthetic or any \"jonh\" product. MAX dose 1 mL subcutaneous OR intradermal in divided doses.          1448-Auto Hold       2225-Unhold        0823-Med Discontinued "         Dose: 5 mg Freq: EVERY 6 HOURS PRN Route: PO  PRN Comment: nausea and vomiting  Start: 12/31/17 1357   End: 01/04/18 0823   Admin Instructions: This is Step 3 of nausea and vomiting management.  Give if nausea not resolved 15 minutes after giving prochlorperazine (COMPAZINE).  If nausea not resolved in 15-30 minutes, Notify provider.          1448-Auto Hold       2225-Unhold        0823-Med Discontinued      Or    Dose: 5 mg Freq: EVERY 6 HOURS PRN Route: IV  PRN Comment: nausea and vomiting  Start: 12/31/17 1357   End: 01/04/18 0823   Admin Instructions: This is Step 3 of nausea and vomiting management.  Give if nausea not resolved 15 minutes after giving prochlorperazine (COMPAZINE).  If nausea not resolved in 15-30 minutes, Notify provider.  Avoid use if patient has full bowel obstruction or perforation. Irritant. For ordered doses up to 10 mg, give IV Push undiluted over 2 minutes.          1448-Auto Hold       2225-Unhold        0823-Med Discontinued         Dose: 0.1-0.4 mg Freq: EVERY 2 MIN PRN Route: IV  PRN Reason: opioid reversal  Start: 01/03/18 2232   End: 01/04/18 0823   Admin Instructions: For respiratory rate LESS than or EQUAL to 8.  Partial reversal dose:  0.1 mg titrated q 2 minutes for Analgesia Side Effects Monitoring Sedation Level of 3 (frequently drowsy, arousable, drifts to sleep during conversation).Full reversal dose:  0.4 mg bolus for Analgesia Side Effects Monitoring Sedation Level of 4 (somnolent, minimal or no response to stimulation).  For ordered doses up to 2mg give IVP. Give each 0.4mg over 15 seconds in emergency situations. For non-emergent situations further dilute in 9mL of NS to facilitate titration of response.           0823-Med Discontinued         Rate: 1.1-55.3 mL/hr Freq: CONTINUOUS PRN Route: IV  PRN Comment: START only if needed for Systolic Blood Pressure greater than 180 mmHg or Diastolic Blood Pressure greater than 90 mmHg.  Start: 01/03/18 2232   End:  01/04/18 0823   Admin Instructions: Start at lowest dose ordered. Titrate by 0.25 to 0.5 mcg/kg/min every 5 minutes to Systolic Blood Pressure 130 to 150 mmHg.  Max: 5 mcg/kg/min.   Notify provider if higher starting doses are initiated or if the titration reaches the upper range limit.  FOR ICU ONLY.  Conc: 0.4 mg/mL. Protect from light. Irritant.           0823-Med Discontinued         Dose: 5 mg Freq: EVERY 6 HOURS PRN Route: IV  PRN Reasons: nausea,vomiting  Start: 12/31/17 1357   End: 01/04/18 0823   Admin Instructions: This is Step 2 of nausea and vomiting management. Give if nausea not resolved 15 minutes after giving ondansetron (ZOFRAN). If nausea not resolved in 15 minutes, go to Step 3 metoclopramide (REGLAN), if ordered.  For ordered doses up to 10 mg, give IV Push undiluted. Each 5mg over 1 minute.          1448-Auto Hold       2225-Unhold        0823-Med Discontinued      Or    Dose: 5 mg Freq: EVERY 6 HOURS PRN Route: PO  PRN Reason: vomiting  Start: 12/31/17 1357   End: 01/04/18 0823   Admin Instructions: This is Step 2 of nausea and vomiting management. Give if nausea not resolved 15 minutes after giving ondansetron (ZOFRAN). If nausea not resolved in 15 minutes, go to Step 3 metoclopramide (REGLAN), if ordered.          1448-Auto Hold       2225-Unhold        0823-Med Discontinued      Or    Dose: 12.5 mg Freq: EVERY 12 HOURS PRN Route: RE  PRN Reasons: nausea,vomiting  Start: 12/31/17 1357   End: 01/04/18 0823   Admin Instructions: This is Step 2 of nausea and vomiting management. Give if nausea not resolved 15 minutes after giving ondansetron (ZOFRAN). If nausea not resolved in 15 minutes, go to Step 3 metoclopramide (REGLAN), if ordered.          1448-Auto Hold       2225-Unhold        0823-Med Discontinued         Dose: 10-20 mL Freq: EVERY 1 HOUR PRN Route: IK  PRN Reasons: line flush,post meds or blood draw  Start: 01/04/18 0456   End: 01/04/18 0823   Admin Instructions: to flush CVC - Open  Ended (Tunneled and Non-Tunneled).   10 mL post IV meds; 20 mL post blood draw.           0823-Med Discontinued         Dose: 3 mL Freq: EVERY 8 HOURS Route: IK  Start: 01/03/18 1615   End: 01/03/18 1937   Admin Instructions: And Q1H PRN, to lock peripheral IV dormant line.                 1937-Med Discontinued          Dose: 3 mL Freq: EVERY 8 HOURS Route: IK  Start: 12/31/17 1400   End: 01/04/18 0459   Admin Instructions: And Q1H PRN, to lock peripheral IV dormant line.              2145 (3 mL)-Given        (0923)-Not Given       (1501)-Not Given       2303 (3 mL)-Given        0708 (3 mL)-Given       1902 (3 mL)-Given       2102 (3 mL)-Given        0626 (3 mL)-Given              1448-Auto Hold       2200-Automatically Held       2225-Unhold        0459-Med Discontinued         Dose: 3 mL Freq: EVERY 1 HOUR PRN Route: IK  PRN Reason: line flush  PRN Comment: for peripheral IV flush post IV meds  Start: 12/31/17 1356   End: 01/04/18 0823         1448-Auto Hold       2225-Unhold        0823-Med Discontinued         Freq: PRN  Start: 01/03/18 1650   End: 01/03/18 2225         1650 (1,000 mL)-Given [C]       2225-Med Discontinued          Freq: PRN  Start: 01/03/18 1700   End: 01/03/18 2225         1700 (1,000 mL)-Given       2225-Med Discontinued     Medications 12/29/17 12/30/17 12/31/17 01/01/18 01/02/18 01/03/18 01/04/18

## 2017-12-31 NOTE — PROGRESS NOTES
12/31/17 1551   General Information   Onset Date 12/31/17   Start of Care Date 12/31/17   Referring Physician Dr. Tidwell   Patient Profile Review/OT: Additional Occupational Profile Info See Profile for full history and prior level of function   Patient/Family Goals Statement Patient wants to eat and drink something.    Swallowing Evaluation Bedside swallow evaluation   Behaviorial Observations Alert;Confused   Mode of current nutrition NPO   Respiratory Status Room air   Comments Dustin Pearce is a 89 year old male who presents with confusion. The patient arrives via EMS, who provide the history for this visit. EMS reports that the patient and his wife were getting ready to go visit a family member when his wife noticed a change in the patient's mentation, specifically that he was unable to complete very routine tasks or follow her directions. The wife then took the patient to their bedroom to lie down and was in bed for about an hour before falling out. The wife reported that the patient did not strike his head during this fall, but she became very concerned and called EMS for transport to the ED. En route, EMS reports that the patient's blood sugar was 225, and his blood pressure was 130s/70s. They state that while they were unable to do a thorough neurologic exam due to the patient being uncooperative, they noticed some possible left-sided weakness. Here, the patient denies any pain currently and specifically denies headache, neck pain, or chest pain. He denies any numbness, weakness, or tingling.    Clinical Swallow Evaluation   Oral Musculature generally intact   Structural Abnormalities none present   Dentition present and adequate   Mucosal Quality adequate   Mandibular Strength and Mobility intact   Oral Labial Strength and Mobility impaired retraction;impaired pursing;impaired coordination  (Minimal left droop. )   Lingual Strength and Mobility impaired coordination;impaired left lateral  movement;impaired right lateral movement   Velar Elevation intact   Buccal Strength and Mobility intact   Laryngeal Function Cough;Throat clear;Swallow;Voicing initiated;Dry swallow palpated   Oral Musculature Comments Mild deficits with coordination.    Swallow Eval   Feeding Assistance minimal assistance required   Clinical Swallow Eval: Thin Liquid Texture Trial   Mode of Presentation, Thin Liquids cup;self-fed;spoon;fed by clinician   Volume of Liquid or Food Presented 3 oz of water   Oral Phase of Swallow Premature pharyngeal entry   Pharyngeal Phase of Swallow impaired;reduction in laryngeal movement;repeated swallows   Diagnostic Statement No overt Sx of aspiration can not rule out penetration or silent aspiration.    Clinical Swallow Eval: Puree Solid Texture Trial   Mode of Presentation, Puree spoon;fed by clinician   Volume of Puree Presented 4 teaspoons of pudding   Oral Phase, Puree Residue in oral cavity   Oral Residue, Puree soft palate   Pharyngeal Phase, Puree impaired;repeated swallows   Diagnostic Statement No overt Sx of aspiration.   Clinical Swallow Eval: Solid Food Texture Trial   Mode of Presentation, Solid self-fed   Volume of Solid Food Presented 1 juan f cracker   Oral Phase, Solid Premature pharyngeal entry;Residue in oral cavity   Oral Residue, Solid left anterior lateral sulci   Pharyngeal Phase, Solid impaired;reduction in laryngeal movement;repeated swallows   Diagnostic Statement Mild oral deficits and pharyngeal. No overt Sx of aspiration.    Swallow Compensations   Swallow Compensations Alternate viscosity of consistencies;Pacing;Reduce amounts;Multiple swallow   Results Suspect silent aspiration;Oral difficulties only   General Therapy Interventions   Planned Therapy Interventions Dysphagia Treatment   Dysphagia treatment Instruction of safe swallow strategies;Modified diet education   Swallow Eval: Clinical Impressions   Skilled Criteria for Therapy Intervention Skilled criteria  met.  Treatment indicated.   Functional Assessment Scale (FAS) 5   Treatment Diagnosis Mild oral and pharyngeal dysphagia   Diet texture recommendations Dysphagia diet level 3;Thin liquids   Recommended Feeding/Eating Techniques alternate between small bites and sips of food/liquid;check mouth frequently for oral residue/pocketing;maintain upright posture during/after eating for 30 mins;no straws;small sips/bites   Therapy Frequency daily   Predicted Duration of Therapy Intervention (days/wks) 5 days   Anticipated Discharge Disposition inpatient rehabilitation facility   Risks and Benefits of Treatment have been explained. Yes   Patient, family and/or staff in agreement with Plan of Care Yes   Clinical Impression Comments Patient presents with mild oral and pharyngeal dysphagia at bedside secondary to probable stroke or TIA, MRI is pending. Patient is known to this department with an OP video swallow study completed 11/16/16, that revealed minimal to mild oral/pharyngeal dysphagia with minimal flash penetration by straw with thin liquids, none by the cup. Today he is slow to respond to questions and confused at bedside. He demonstrated mild holding of the thin liquids with slight delay but no overt Sx of aspiration via the spoon or cup. He tolerated pudding with mildly reduced AP movement of the bolus and minimal oral residue on the soft palate. Mastication was sufficient for solids mildly reduced propulsion of the bolus and 2 swallows to clear. No overt Sx of aspiration on small amount consumed. Recommend: 1. Dysphagia Diet level 3 with thin liquids. 2. Upright, no straws, initial supervision/assistance, small bites/sips and alternate liquids/solids. Crush medium to large pills. Small may be taken with nectar thick liquids if difficulty with water.    Total Evaluation Time   Total Evaluation Time (Minutes) 20

## 2017-12-31 NOTE — ED NOTES
"[facility]  ED Nurse Handoff Report    ED Chief complaint: Cerebrovascular Accident      ED Diagnosis:   Final diagnoses:   Cerebrovascular accident (CVA), unspecified mechanism (H)       Code Status: Full Code    Allergies:   Allergies   Allergen Reactions     Oxycodone Other (See Comments)     \"TERRIBLE SWEATING\"     Sulfa Drugs      Tetracycline        Activity level - Baseline/Home:  Independent    Activity Level - Current:   Stand with Assist of 2     Needed?: No    Isolation: No  Infection: Not Applicable    Bariatric?: No    Vital Signs:   Vitals:    12/31/17 1155 12/31/17 1200 12/31/17 1215 12/31/17 1224   BP: (!) 144/104   (!) 139/113   Pulse: 76      Resp: 16 14 21 25   Temp: 97.7  F (36.5  C)      TempSrc: Oral      SpO2: 97% 97%  93%       Cardiac Rhythm: ,        Pain level:      Is this patient confused?: Yes    Patient Report: Initial Complaint: stroke  Focused Assessment:  89 year old male who presents with confusion. The patient arrives via EMS, who provide the history for this visit. EMS reports that the patient and his wife were getting ready to go visit a family member when his wife noticed a change in the patient's mentation, specifically that he was unable to complete very routine tasks or follow her directions. The wife then took the patient to their bedroom to lie down and was in bed for about an hour before falling out. The wife reported that the patient did not strike his head during this fall, but she became very concerned and called EMS for transport to the ED. En route, EMS reports that the patient's blood sugar was 225, and his blood pressure was 130s/70s. They state that while they were unable to do a thorough neurologic exam due to the patient being uncooperative, they noticed some possible left-sided weakness. Here, the patient denies any pain currently and specifically denies headache, neck pain, or chest pain. He denies any numbness, weakness, or " "tingling.      Additional information was obtained upon arrival of his wife. She reports that the patient was last at his baseline last night. She state sthat the patient awoke around 5:00 AM feeling confused and \"not quite right\". She continues that he then awoke at 8:30 AM with slurred speech and persistent confusion. Pt continues to have difficulty with mentation at this time.  Plan for MRI and echo.    PT SEEMS TO BE SOMEWHAT IMPROVED SINCE ARRIVAL.  NEURO ASSESSMENT DIFFICULT DUE TO POOR VISION AT BASELINE.  PT ABLE TO FOLLOW COMMANDS WITH LOTS OF DIRECTION.  SENSATION PRESENT AND SPEECH MORE CLEAR AT TIME OF ADMISSION.    Tests Performed: CT labs  Abnormal Results:   Results for orders placed or performed during the hospital encounter of 12/31/17 (from the past 24 hour(s))   Basic metabolic panel   Result Value Ref Range    Sodium 137 133 - 144 mmol/L    Potassium 5.4 (H) 3.4 - 5.3 mmol/L    Chloride 102 94 - 109 mmol/L    Carbon Dioxide 28 20 - 32 mmol/L    Anion Gap 7 3 - 14 mmol/L    Glucose 222 (H) 70 - 99 mg/dL    Urea Nitrogen 19 7 - 30 mg/dL    Creatinine 0.73 0.66 - 1.25 mg/dL    GFR Estimate >90 >60 mL/min/1.7m2    GFR Estimate If Black >90 >60 mL/min/1.7m2    Calcium 9.5 8.5 - 10.1 mg/dL   CBC with platelets differential   Result Value Ref Range    WBC 9.6 4.0 - 11.0 10e9/L    RBC Count 4.43 4.4 - 5.9 10e12/L    Hemoglobin 13.7 13.3 - 17.7 g/dL    Hematocrit 41.8 40.0 - 53.0 %    MCV 94 78 - 100 fl    MCH 30.9 26.5 - 33.0 pg    MCHC 32.8 31.5 - 36.5 g/dL    RDW 13.0 10.0 - 15.0 %    Platelet Count 160 150 - 450 10e9/L    Diff Method Automated Method     % Neutrophils 64.3 %    % Lymphocytes 25.5 %    % Monocytes 9.2 %    % Eosinophils 0.5 %    % Basophils 0.2 %    % Immature Granulocytes 0.3 %    Nucleated RBCs 0 0 /100    Absolute Neutrophil 6.1 1.6 - 8.3 10e9/L    Absolute Lymphocytes 2.4 0.8 - 5.3 10e9/L    Absolute Monocytes 0.9 0.0 - 1.3 10e9/L    Absolute Eosinophils 0.1 0.0 - 0.7 10e9/L    " Absolute Basophils 0.0 0.0 - 0.2 10e9/L    Abs Immature Granulocytes 0.0 0 - 0.4 10e9/L    Absolute Nucleated RBC 0.0    INR   Result Value Ref Range    INR 0.99 0.86 - 1.14   Partial thromboplastin time   Result Value Ref Range    PTT 32 22 - 37 sec   CT Head w/o Contrast    Narrative    CT OF THE HEAD WITHOUT CONTRAST 12/31/2017 11:39 AM     COMPARISON: Brain MR 1/20/2017.    HISTORY: Code Stroke; left-sided weakness.    TECHNIQUE: 5 mm thick axial CT images of the head were acquired  without IV contrast material.    FINDINGS: A moderate-sized chronic left frontal infarct is again noted  without change. There is moderate diffuse cerebral volume loss. There  are subtle patchy areas of decreased density in the cerebral white  matter bilaterally that are consistent with sequela of chronic small  vessel ischemic disease.    The ventricles and basal cisterns are within normal limits in  configuration given the degree of cerebral volume loss.  There is no  midline shift. There are no extra-axial fluid collections.     No intracranial hemorrhage, mass or recent infarct.    The visualized paranasal sinuses are well-aerated. There is no  mastoiditis. There are no fractures of the visualized bones.       Impression    IMPRESSION: Moderate-sized chronic left frontal infarct again noted  without change. Diffuse cerebral volume loss and cerebral white matter  changes consistent with chronic small vessel ischemic disease. No  evidence for acute intracranial pathology.       Radiation dose for this scan was reduced using automated exposure  control, adjustment of the mA and/or kV according to patient size, or  iterative reconstruction technique.   CTA Angiogram Head Neck    Narrative    CT ANGIOGRAM OF THE HEAD AND NECK WITHOUT AND WITH CONTRAST   12/31/2017 11:52 AM     COMPARISON: None.    HISTORY: Code Stroke. Left-sided weakness.    TECHNIQUE:  Precontrast localizing scans were followed by CT  angiography with an injection  of 70 mL Isovue-370 nonionic intravenous  contrast material with scans through the head and neck.  Images were  transferred to a separate 3-D workstation where multiplanar  reformations and 3-D images were created.  Estimates of carotid  stenoses are made relative to the distal internal carotid artery  diameters except as noted.      FINDINGS:   Neck CTA: The common carotid arteries bilaterally remain patent  without stenosis. Chronic occlusion of the left internal carotid  artery beginning at the origin again noted. Atherosclerotic plaque  resulting in less than 50% luminal diameter stenosis of the origin of  the right internal carotid artery again noted. The cervical internal  carotid artery is tortuous but is otherwise patent with no additional  areas of narrowing. There is mild atherosclerotic narrowing at the  origin of the left vertebral artery. The right vertebral artery is  patent without stenosis.    Head CTA: There is calcified atherosclerotic plaque of the  intracranial distal internal carotid artery on the right that does not  result in stenosis. The left internal carotid artery is occluded.  There is moderate atherosclerotic narrowing at the distal P2 segment  of the left posterior cerebral artery that is unchanged. The basilar  artery is patent without stenosis. The bilateral anterior cerebral,  bilateral middle cerebral and right posterior cerebral arteries are  patent without stenosis. The anterior communicating and bilateral  posterior communicating arteries remain patent.      Impression    IMPRESSION:  1. 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  2. Moderate atherosclerotic narrowing of the distal P2 segment of the  left posterior cerebral artery again noted without change.  3. Atherosclerotic plaque at the origin of the right internal carotid  artery resulting in less than 50% luminal diameter stenosis again  noted.  4. No  evidence for intracranial cerebral artery occlusion to explain  the patient's recent symptoms.  5. Overall, no change from the comparison study.      Radiation dose for this scan was reduced using automated exposure  control, adjustment of the mA and/or kV according to patient size, or  iterative reconstruction technique.   CT Head w Contrast    Narrative    CT BRAIN PERFUSION 12/31/2017 11:56 AM    COMPARISON: None.    HISTORY:  Code Stroke; left-sided weakness.    TECHNIQUE: Time sequential axial CT images of the head were acquired  during the administration of intravenous contrast (50 mL Isovue-370).  CTA images of the [place] as well as color perfusion maps of  the brain were created from this time sequential axial source data.      Impression    IMPRESSION: There is a large chronic infarct at the anterolateral  aspect of the left frontal lobe. There are no other perfusion defects.    Radiation dose for this scan was reduced using automated exposure  control, adjustment of the mA and/or kV according to patient size, or  iterative reconstruction technique.       Treatments provided: NS bolus, rectal ASA    Family Comments: significant other    OBS brochure/video discussed/provided to patient: N/A    ED Medications:   Medications   iopamidol (ISOVUE-370) solution 120 mL (120 mLs Intravenous Given 12/31/17 1142)   Saline flush (100 mLs Intravenous Given 12/31/17 1143)   aspirin Suppository 300 mg (300 mg Rectal Given 12/31/17 1219)       Drips infusing?:  No      ED NURSE PHONE NUMBER: [phone]

## 2017-12-31 NOTE — IP AVS SNAPSHOT
[facility]    [address] [place] [address]    Phone:  [phone]                                       After Visit Summary   12/31/2017    Dustin Pearce    MRN: [id number]           After Visit Summary Signature Page     I have received my discharge instructions, and my questions have been answered. I have discussed any challenges I see with this plan with the nurse or doctor.    ..........................................................................................................................................  Patient/Patient Representative Signature      ..........................................................................................................................................  Patient Representative Print Name and Relationship to Patient    ..................................................               ................................................  Date                                            Time    ..........................................................................................................................................  Reviewed by Signature/Title    ...................................................              ..............................................  Date                                                            Time

## 2017-12-31 NOTE — PLAN OF CARE
Problem: Patient Care Overview  Goal: Plan of Care/Patient Progress Review  Outcome: No Change  Pt admitted from the ED; alert but confused; oriented to self and situation. 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 NPO until seen by speech. Up with assist of 2 to the bedside/ unsteady gait. Denies pain; pt fall risk/ bed alarm on. Plan for Echo and brain MRI pending; will continue to monitor.

## 2017-12-31 NOTE — CONSULTS
[facility]    Neurology Consultation     Date of Admission:  12/31/2017  Date of Consult (When I saw the patient): 12/31/17    Assessment & Plan   Dustin Pearce is a 89 year old male who was admitted on 12/31/2017. I was asked to see the patient for confusion and left neglect.  Clinically is right cortical stroke, likely small.  Out of window for IV tPA and no new large vessel cutoffs so no IA treatment.  Given OR asa 300 mg in ER.      #.CVA  #. Admit for stroke workup  --MRI brain: pending  --vessel imaging: cta head and neck 1. 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  2. Moderate atherosclerotic narrowing of the distal P2 segment of the  left posterior cerebral artery again noted without change.  3. Atherosclerotic plaque at the origin of the right internal carotid  artery resulting in less than 50% luminal diameter stenosis again  noted.  4. No evidence for intracranial cerebral artery occlusion to explain  the patient's recent symptoms.  5. Overall, no change from the comparison study.  --echo with bubble: pending  --ER EKG: nsr  --tele monitoring: started  --labs: HA1C, lipids, TSH pending  --BP: allow permissive to 220/110  --Treatment: asa in ER.  Continue plavix, increase asa to 325 mg a day for time being.  #. DVT Prophylaxis  --SCDs  #. PT/OT/Speech  --Hold evaluations- likely start tomorrow  #. Nutrition / GI Prophylaxis  --Per recommendations of speech therapy      I discussed the above diagnosis, assessment, and further testing with the patient and his partner, Halina Chaney MD    Code Status    Prior        Reason for Consult   Reason for consult: I was asked by Dr. Oliver to evaluate this patient for confusion and left neglect.      Chief Complaint   confusion    History is obtained from the patient, his partner Halina and the electronic medical chart.    History of Present Illness    Dustin Pearce is a 89 year old male with history of strokes and intracranial stenosis, taking aspirin and Plavix at home, who presents with confusion and left-sided neglect.  He was last seen normal last night by his partner Halina.  This morning at 5:30 in the morning he got up.  His partner thought that he was confused but no obvious neurological deficits at that time.  Then at 840 he woke up again and seemed even more confused.  He had trouble understanding to go to the bathroom.  He then fell probably to his knees, and at that time Halina called 911.  He has been speaking normally but does seem confused per patient's significant other.    Past Medical History   I have reviewed this patient's medical history and updated it with pertinent information if needed.   Past Medical History:   Diagnosis Date     Aortic root dilatation (H)      Aortic stenosis      Benign essential hypertension 1/6/2017     Benign non-nodular prostatic hyperplasia with lower urinary tract symptoms 10/20/2016     CAD (coronary artery disease)     MI 1970     Cardiomyopathy (H)      Carotid artery stenosis 10/20/2016    chronically occluded left internal carotid artery     Carotid occlusion, left      Coronary artery disease involving native coronary artery of native heart without angina pectoris 10/20/2016     Diabetes mellitus (H)      DJD (degenerative joint disease)      Gastro-oesophageal reflux disease      Gastroesophageal reflux disease without esophagitis 10/20/2016     Heart attack      Hyperlipidemia      Hypertension      Mild cognitive impairment 10/20/2016     Nephrolithiasis     RECURRENT     Osteopenia      PAD (peripheral artery disease) (H)     peripheral angiogram 5/2017: The common iliac artery stenoses were stented and the superficial femoral artery stenoses were angioplastied     Paroxysmal atrial fibrillation (H)      PONV (postoperative nausea and vomiting)      RBBB with left anterior fascicular block       Unspecified cerebral artery occlusion with cerebral infarction        Past Surgical History   I have reviewed this patient's surgical history and updated it with pertinent information if needed.  Past Surgical History:   Procedure Laterality Date     AMPUTATE FOOT Left 6/4/2017    Procedure: AMPUTATE FOOT;  Sun Add#3: partial left foot amputation - Sagital Saw - Mini C-Arm;  Surgeon: Moe Kirk DPM;  Location:  OR     CHOLECYSTECTOMY       ESOPHAGOSCOPY, GASTROSCOPY, DUODENOSCOPY (EGD), COMBINED N/A 12/26/2016    Procedure: COMBINED ESOPHAGOSCOPY, GASTROSCOPY, DUODENOSCOPY (EGD);  Surgeon: Nasim Louis MD;  Location:  GI     GENITOURINARY SURGERY      KIDNEY STONE REMOVAL X 4     HC UGI ENDOSCOPY W EUS N/A 12/29/2016    Procedure: COMBINED ENDOSCOPIC ULTRASOUND, ESOPHAGOSCOPY, GASTROSCOPY, DUODENOSCOPY (EGD);  Surgeon: Nasim Louis MD;  Location:  GI     HERNIA REPAIR       INCISION AND DRAINAGE FOOT, COMBINED Left 6/6/2017    Procedure: COMBINED INCISION AND DRAINAGE FOOT;  REVISIONAL LEFT FOOT INCISION AND DRAINAGE WITH POSSIBLE FLAP CLOSURE , ANTIBIOTIC SOLUTION ;  Surgeon: Nabil Ann DPM;  Location:  OR     LASER HOLMIUM LITHOTRIPSY URETER(S), INSERT STENT, COMBINED  3/2/2012    Procedure:COMBINED CYSTOSCOPY, URETEROSCOPY, LASER HOLMIUM LITHOTRIPSY URETER(S), INSERT STENT; CYSTOSCOPY, RIGHT RETROGRADES, RIGHT URETEROSCOPY, HOLMIUM LASER, RIGHT STENT PLACEMENT; Surgeon:ANJELICA LEÓN; Location: OR     ROTATOR CUFF REPAIR RT/LT         Prior to Admission Medications   Prior to Admission Medications   Prescriptions Last Dose Informant Patient Reported? Taking?   AMITRIPTYLINE HCL PO  Spouse/Significant Other Yes No   Sig: Take 25 mg by mouth nightly as needed for sleep   ATORVASTATIN CALCIUM PO  Spouse/Significant Other Yes No   Sig: Take 80 mg by mouth daily   Clopidogrel Bisulfate, [id number], (PLAVIX PO)  Spouse/Significant Other Yes No   Sig: Take 75 mg  "by mouth daily    DULoxetine (CYMBALTA) 30 MG EC capsule  Spouse/Significant Other No No   Sig: Take 1 capsule (30 mg) by mouth daily   LISINOPRIL PO   Yes No   Sig: Take 2.5 mg % by mouth daily   METFORMIN HCL PO  Spouse/Significant Other Yes No   Sig: Take 1,000 mg by mouth 2 times daily (with meals)   Psyllium (METAMUCIL PO)  Spouse/Significant Other Yes No   Sig: Take 1 packet by mouth daily as needed    TRAMADOL HCL PO   Yes No   Sig: Take 25 mg by mouth every 6 hours as needed for moderate to severe pain   aspirin EC 81 MG EC tablet  Spouse/Significant Other No No   Sig: Take 1 tablet (81 mg) by mouth daily   blood glucose monitoring (NO BRAND SPECIFIED) test strip  Spouse/Significant Other No No   Sig: Use to test blood sugar three times daily or as directed.   glipiZIDE (GLUCOTROL) 10 MG tablet   Yes No   Sig: Take 1 tablet (10 mg) by mouth 2 times daily (before meals)   ipratropium (ATROVENT) 0.03 % spray  Spouse/Significant Other Yes No   Sig: Spray 2 sprays into both nostrils every 12 hours   metoprolol (TOPROL-XL) 25 MG 24 hr tablet   No No   Sig: Take 0.5 tablets (12.5 mg) by mouth daily   omeprazole (PRILOSEC) 40 MG capsule  Spouse/Significant Other No No   Sig: Take 1 capsule (40 mg) by mouth daily Take 30-60 minutes before a meal.   order for DME  Spouse/Significant Other No No   Sig: Equipment being ordered: [employer] Blood Glucose meter.   tamsulosin (FLOMAX) 0.4 MG capsule  Spouse/Significant Other No No   Sig: Take 1 capsule (0.4 mg) by mouth daily      Facility-Administered Medications: None     Allergies   Allergies   Allergen Reactions     Oxycodone Other (See Comments)     \"TERRIBLE SWEATING\"     Sulfa Drugs      Tetracycline        Social History   I have reviewed this patient's social history and updated it with pertinent information if needed. Dustin Pearce  reports that he quit smoking about 19 years ago. His smoking use included Cigarettes. He has a 30.00 pack-year smoking history. He " has never used smokeless tobacco. He reports that he drinks about 4.2 oz of alcohol per week  He reports that he does not use illicit drugs.    Family History   I have reviewed this patient's family history and updated it with pertinent information if needed.   Family History   Problem Relation Age of Onset     Breast Cancer Mother      Heart Failure Father 81       Review of Systems   To the best of my ability the 10 point Review of Systems is negative other than noted in the HPI.    Physical Exam   Temp: 97.7  F (36.5  C) Temp src: Oral BP: (!) 144/104 Pulse: 76 Heart Rate: 76 Resp: 16 SpO2: 97 % O2 Device: None (Room air)    Vital Signs with Ranges  Temp:  [97.7  F (36.5  C)] 97.7  F (36.5  C)  Pulse:  [76] 76  Heart Rate:  [76] 76  Resp:  [16] 16  BP: (144)/(104) 144/104  SpO2:  [97 %] 97 %  0 lbs 0 oz    General Appearance:  No acute distress  Neuro:       Mental Status Exam: Awake alert, able to answer most questions correctly.  Seems agitated and confused.  Able to show thumbs up on either side.  However shows extinction on the left and neglect of the left side.       Cranial Nerves: Visual field cut left upper and lower- moderate. extraocular movements are intact but requires specifically telling him to look to the left side.  Very mild left lower facial droop.  Patient reports normal left facial sensation but I am not clear if that is correct.  Otherwise cranial nerves II through XII are intact.       Motor: 5 out of 5 bilateral upper extremities and bilateral lower extremities.  Extinction and mild neglect on the left.           Reflexes: 0 throughout, no clonus at the ankles       Sensory: Normal light touch on the right.  Appears to have absent light touch on the left upper extremity and left lower extremity.  Definitely has extinction of the left side.       Coordination: Finger-nose-finger normal bilaterally       Gait: Deferred fall risk  Neck: no nuchal rigidity. No carotid bruits.    Extremities: No  clubbing, no cyanosis, no edema  CV: RRR nl s1, s2  Resp: CTAB    National [employer] Stroke Scale  Exam Interval: Baseline   Score    Level of consciousness: (0)   Alert, keenly responsive    LOC questions: (0)   Answers both questions correctly    LOC commands: (0)   Performs both tasks correctly    Best gaze: (0)   Normal    Visual: (2)   Complete hemianopia    Facial palsy: (1)   Minor paralysis (flat nasolabial fold, smile asymmetry)    Motor arm (left): (0)   No drift    Motor arm (right): (0)   No drift    Motor leg (left): (0)   No drift    Motor leg (right): (0)   No drift    Limb ataxia: (0)   Absent    Sensory: (1)   Mild to moderate sensory loss    Best language: (0)   Normal- no aphasia    Dysarthria: (0)   Normal    Extinction and inattention: (1)   Visual, tacile, auditory, spatial, person inattention        Total Score:  5           Data   Results for orders placed or performed during the hospital encounter of 12/31/17 (from the past 24 hour(s))   Basic metabolic panel   Result Value Ref Range    Sodium 137 133 - 144 mmol/L    Potassium 5.4 (H) 3.4 - 5.3 mmol/L    Chloride 102 94 - 109 mmol/L    Carbon Dioxide 28 20 - 32 mmol/L    Anion Gap 7 3 - 14 mmol/L    Glucose 222 (H) 70 - 99 mg/dL    Urea Nitrogen 19 7 - 30 mg/dL    Creatinine 0.73 0.66 - 1.25 mg/dL    GFR Estimate >90 >60 mL/min/1.7m2    GFR Estimate If Black >90 >60 mL/min/1.7m2    Calcium 9.5 8.5 - 10.1 mg/dL   CBC with platelets differential   Result Value Ref Range    WBC 9.6 4.0 - 11.0 10e9/L    RBC Count 4.43 4.4 - 5.9 10e12/L    Hemoglobin 13.7 13.3 - 17.7 g/dL    Hematocrit 41.8 40.0 - 53.0 %    MCV 94 78 - 100 fl    MCH 30.9 26.5 - 33.0 pg    MCHC 32.8 31.5 - 36.5 g/dL    RDW 13.0 10.0 - 15.0 %    Platelet Count 160 150 - 450 10e9/L    Diff Method Automated Method     % Neutrophils 64.3 %    % Lymphocytes 25.5 %    % Monocytes 9.2 %    % Eosinophils 0.5 %    % Basophils 0.2 %    % Immature Granulocytes 0.3 %    Nucleated  RBCs 0 0 /100    Absolute Neutrophil 6.1 1.6 - 8.3 10e9/L    Absolute Lymphocytes 2.4 0.8 - 5.3 10e9/L    Absolute Monocytes 0.9 0.0 - 1.3 10e9/L    Absolute Eosinophils 0.1 0.0 - 0.7 10e9/L    Absolute Basophils 0.0 0.0 - 0.2 10e9/L    Abs Immature Granulocytes 0.0 0 - 0.4 10e9/L    Absolute Nucleated RBC 0.0    INR   Result Value Ref Range    INR 0.99 0.86 - 1.14   Partial thromboplastin time   Result Value Ref Range    PTT 32 22 - 37 sec   CT Head w/o Contrast    Narrative    CT OF THE HEAD WITHOUT CONTRAST 12/31/2017 11:39 AM     COMPARISON: Brain MR 1/20/2017.    HISTORY: Code Stroke; left-sided weakness.    TECHNIQUE: 5 mm thick axial CT images of the head were acquired  without IV contrast material.    FINDINGS: A moderate-sized chronic left frontal infarct is again noted  without change. There is moderate diffuse cerebral volume loss. There  are subtle patchy areas of decreased density in the cerebral white  matter bilaterally that are consistent with sequela of chronic small  vessel ischemic disease.    The ventricles and basal cisterns are within normal limits in  configuration given the degree of cerebral volume loss.  There is no  midline shift. There are no extra-axial fluid collections.     No intracranial hemorrhage, mass or recent infarct.    The visualized paranasal sinuses are well-aerated. There is no  mastoiditis. There are no fractures of the visualized bones.       Impression    IMPRESSION: Moderate-sized chronic left frontal infarct again noted  without change. Diffuse cerebral volume loss and cerebral white matter  changes consistent with chronic small vessel ischemic disease. No  evidence for acute intracranial pathology.       Radiation dose for this scan was reduced using automated exposure  control, adjustment of the mA and/or kV according to patient size, or  iterative reconstruction technique.   CTA Angiogram Head Neck    Narrative    CT ANGIOGRAM OF THE HEAD AND NECK WITHOUT AND WITH  CONTRAST   12/31/2017 11:52 AM     COMPARISON: None.    HISTORY: Code Stroke. Left-sided weakness.    TECHNIQUE:  Precontrast localizing scans were followed by CT  angiography with an injection of 70 mL Isovue-370 nonionic intravenous  contrast material with scans through the head and neck.  Images were  transferred to a separate 3-D workstation where multiplanar  reformations and 3-D images were created.  Estimates of carotid  stenoses are made relative to the distal internal carotid artery  diameters except as noted.      FINDINGS:   Neck CTA: The common carotid arteries bilaterally remain patent  without stenosis. Chronic occlusion of the left internal carotid  artery beginning at the origin again noted. Atherosclerotic plaque  resulting in less than 50% luminal diameter stenosis of the origin of  the right internal carotid artery again noted. The cervical internal  carotid artery is tortuous but is otherwise patent with no additional  areas of narrowing. There is mild atherosclerotic narrowing at the  origin of the left vertebral artery. The right vertebral artery is  patent without stenosis.    Head CTA: There is calcified atherosclerotic plaque of the  intracranial distal internal carotid artery on the right that does not  result in stenosis. The left internal carotid artery is occluded.  There is moderate atherosclerotic narrowing at the distal P2 segment  of the left posterior cerebral artery that is unchanged. The basilar  artery is patent without stenosis. The bilateral anterior cerebral,  bilateral middle cerebral and right posterior cerebral arteries are  patent without stenosis. The anterior communicating and bilateral  posterior communicating arteries remain patent.      Impression    IMPRESSION:  1. 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  2. Moderate atherosclerotic narrowing of the distal P2 segment of the  left posterior  cerebral artery again noted without change.  3. Atherosclerotic plaque at the origin of the right internal carotid  artery resulting in less than 50% luminal diameter stenosis again  noted.  4. No evidence for intracranial cerebral artery occlusion to explain  the patient's recent symptoms.  5. Overall, no change from the comparison study.      Radiation dose for this scan was reduced using automated exposure  control, adjustment of the mA and/or kV according to patient size, or  iterative reconstruction technique.   CT Head w Contrast    Narrative    CT BRAIN PERFUSION 12/31/2017 11:56 AM    COMPARISON: None.    HISTORY:  Code Stroke; left-sided weakness.    TECHNIQUE: Time sequential axial CT images of the head were acquired  during the administration of intravenous contrast (50 mL Isovue-370).  CTA images of the [place] as well as color perfusion maps of  the brain were created from this time sequential axial source data.      Impression    IMPRESSION: There is a large chronic infarct at the anterolateral  aspect of the left frontal lobe. There are no other perfusion defects.    Radiation dose for this scan was reduced using automated exposure  control, adjustment of the mA and/or kV according to patient size, or  iterative reconstruction technique.           Imaging and procedures:  I personally reviewed these images.  CTH normal  CTA 1. 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  2. Moderate atherosclerotic narrowing of the distal P2 segment of the  left posterior cerebral artery again noted without change.  3. Atherosclerotic plaque at the origin of the right internal carotid  artery resulting in less than 50% luminal diameter stenosis again  noted.  4. No evidence for intracranial cerebral artery occlusion to explain  the patient's recent symptoms.  5. Overall, no change from the comparison study.  CTP no obvious asymmetry but R  occipital region a little lower on ttp

## 2017-12-31 NOTE — IP AVS SNAPSHOT
Dustin Pearce #[id number] (CSN: [id number])  (89 year old M)  (Adm: 17)     [id number]               [place] [address] SURGICAL SPECIALITIES: [phone]            Patient Demographics     Patient Name Sex          Age SSN Address Phone    Dustin Paerce Male 1928 (89 year old) [id number] [facility] [address] [phone] (Home)  none (Work)  none (Mobile)      Emergency Contact(s)     Name Relation Home Work Mobile    Halina Dhillon Friend none [phone] none      Admission Information     Attending Provider Admitting Provider Admission Type Admission Date/Time    Meseret Tidwell MD Lindeke, Elizabeth A, MD Emergency 17  1123    Discharge Date Hospital Service Auth/Cert Status Service Area     Hospitalist [facility] SERVICES    Unit Room/Bed Admission Status       [facility] 333/333-01 Admission (Confirmed)       Admission     Complaint    Stroke of unknown etiology (H), UNKNOWN, Carotid stenosis      Hospital Account     Name Acct ID Class Status Primary Coverage    Dustin Pearce [id number] Inpatient Open MEDICARE - MEDICARE            Guarantor Account (for Hospital Account #[id number])     Name Relation to Pt Service Area Active? Acct Type    Dustin Pearce  FCS Yes Personal/Family    Address Phone          [address] [place] [id number] [phone](H)              Coverage Information (for Hospital Account #[id number])     1. MEDICARE/MEDICARE     F/O Payor/Plan Precert #    MEDICARE/MEDICARE     Subscriber Subscriber #    Dustin Pearce [id number]    Address Phone    ATTN CLAIMS  [address]  [facility] [place] [address] [phone]          2. [place]     F/O Payor/Plan Precert #    [place]     Subscriber Subscriber #    Dustin Pearce [id number]    Address Phone    [address] [address]  [place] [address] [phone]                                                     "  INTERAGENCY TRANSFER FORM - PHYSICIAN ORDERS   12/31/2017                       [place] [address] SURGICAL SPECIALITIES: [phone]            Attending Provider: Meseret Tidwell MD     Allergies:  Oxycodone, Sulfa Drugs, Tetracycline    Infection:  None   Service:  HOSPITALIST    Ht:  1.727 m (5' 8\")   Wt:  74.4 kg (164 lb 0.4 oz)   Admission Wt:  74.7 kg (164 lb 11.2 oz)    BMI:  24.94 kg/m 2   BSA:  1.89 m 2            ED Clinical Impression     Diagnosis Description Comment Added By Time Added    TIA involving right internal carotid artery [G45.1] TIA involving right internal carotid artery [G45.1]  Anna Velasquez APRN CNP 1/2/2018  9:03 AM    Carotid stenosis, symptomatic w/o infarct, right [I65.21] Carotid stenosis, symptomatic w/o infarct, right [I65.21]  Anna Velasquez APRN CNP 1/2/2018  9:03 AM    Essential hypertension [I10] Essential hypertension [I10]  Anna Velasquez APRN CNP 1/2/2018  9:03 AM    Type 2 diabetes mellitus with other specified complication, without long-term current use of insulin (H) [E11.69] Type 2 diabetes mellitus with other specified complication, without long-term current use of insulin (H) [E11.69]  Anna Velasquez APRN CNP 1/2/2018  9:05 AM      Hospital Problems as of 1/4/2018              Priority Class Noted POA    Stroke of unknown etiology (H) Medium  12/31/2017 Yes    Carotid stenosis Medium  1/3/2018 Yes      Non-Hospital Problems as of 1/4/2018              Priority Class Noted    Coronary artery disease involving native coronary artery of native heart without angina pectoris Medium  10/20/2016    Mild cognitive impairment Medium  10/20/2016    Hyperlipidemia LDL goal <70 Medium  10/20/2016    Benign non-nodular prostatic hyperplasia with lower urinary tract symptoms Medium  10/20/2016    Gastroesophageal reflux disease without esophagitis Medium  10/20/2016    Cerebrovascular accident (H) Medium  10/20/2016    Carotid artery stenosis " Medium  10/20/2016    Abdominal aortic aneurysm (H) Medium  12/25/2016    Lumbar back pain Medium  12/30/2016    Benign essential hypertension Medium  1/6/2017    TIA (transient ischemic attack) Medium  1/20/2017    Paroxysmal atrial fibrillation (H) Medium  Unknown    Ischemic cardiomyopathy Medium  Unknown    Aortic root dilatation (H) Medium  Unknown    Physical deconditioning Medium  6/12/2017    Diabetic ulcer of left foot associated with diabetes mellitus due to underlying condition (H) Medium  6/12/2017    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) Medium  6/12/2017    Anemia due to blood loss, acute Medium  6/13/2017    Diarrhea, unspecified type Medium  6/16/2017    Nausea Medium  6/21/2017    Acute pain of left shoulder Medium  8/7/2017    Balance problems Medium  8/7/2017    Generalized muscle weakness Medium  8/7/2017    PAD (peripheral artery disease) (H) Medium  Unknown    Aortic stenosis Medium  Unknown    RBBB with left anterior fascicular block Medium  Unknown      Code Status History     Date Active Date Inactive Code Status Order ID Comments User Context    6/9/2017 10:07 AM 12/31/2017  1:57 PM Full Code [id number]  Faustino Aguilar MD Outpatient    5/31/2017  8:56 PM 6/9/2017 10:07 AM Full Code [id number]  Blaze Torres MD Inpatient    1/21/2017  3:57 PM 5/31/2017  8:56 PM Full Code [id number]  Tra Reynoso MD Outpatient    1/20/2017  1:55 PM 1/21/2017  3:57 PM Full Code [id number]  Blaze Rose MD Inpatient    12/28/2016  1:26 PM 1/20/2017  1:55 PM Full Code [id number]  Paloma Chen MD Outpatient    12/25/2016  3:13 AM 12/28/2016  1:26 PM Full Code [id number]  Faustino Aguilar MD Inpatient    3/2/2012 11:45 AM 12/25/2016  3:13 AM Full Code [id number]  Wilmar Medrano MD Outpatient      Current Code Status     Date Active Code Status Order ID Comments User Context       12/31/2017  1:57 PM Full Code [id number]  Meseret Tidwell MD Inpatient           Medication Review      UNREVIEWED medications. Ask your doctor about these medications        Dose / Directions Comments    AMITRIPTYLINE HCL PO        Dose:  25 mg   Take 25 mg by mouth nightly as needed for sleep   Refills:  0        ATORVASTATIN CALCIUM PO        Dose:  80 mg   Take 80 mg by mouth daily   Refills:  0        DULoxetine 30 MG EC capsule   Commonly known as:  CYMBALTA   Used for:  Polyneuropathy associated with underlying disease (H)        Dose:  30 mg   Take 1 capsule (30 mg) by mouth daily   Quantity:  90 capsule   Refills:  3        glipiZIDE 10 MG tablet   Commonly known as:  GLUCOTROL   Used for:  Type 2 diabetes mellitus with diabetic peripheral angiopathy without gangrene, without long-term current use of insulin (H)        Dose:  10 mg   Take 1 tablet (10 mg) by mouth 2 times daily (before meals)   Quantity:  180 tablet   Refills:  3        ipratropium 0.03 % spray   Commonly known as:  ATROVENT        Dose:  2 spray   Spray 2 sprays into both nostrils every 12 hours   Refills:  0        LISINOPRIL PO        Dose:  2.5 mg   Take 2.5 mg by mouth daily   Refills:  0        METAMUCIL PO        Dose:  1 packet   Take 1 packet by mouth daily as needed   Refills:  0        METFORMIN HCL PO        Dose:  1000 mg   Take 1,000 mg by mouth 2 times daily (with meals)   Refills:  0        * METOPROLOL SUCCINATE ER PO   This may have changed:  Another medication with the same name was added. Make sure you understand how and when to take each.        Dose:  12.5 mg   Take 12.5 mg by mouth daily   Refills:  0        * metoprolol 25 MG 24 hr tablet   Commonly known as:  TOPROL-XL   Indication:  High Blood Pressure Disorder   This may have changed:  Another medication with the same name was added. Make sure you understand how and when to take each.   Used for:  Cardiomyopathy, unspecified type (H)        Dose:  12.5 mg   Take 0.5 tablets (12.5 mg) by mouth daily   Refills:  0        * metoprolol 25 MG 24  hr tablet   Commonly known as:  TOPROL-XL   This may have changed:  You were already taking a medication with the same name, and this prescription was added. Make sure you understand how and when to take each.   Used for:  Cardiomyopathy, unspecified type (H)        TAKE 1/2 TABLET BY MOUTH DAILY   Quantity:  45 tablet   Refills:  11        omeprazole 40 MG capsule   Commonly known as:  priLOSEC   Used for:  Other acute gastritis without hemorrhage        Dose:  40 mg   Take 1 capsule (40 mg) by mouth daily Take 30-60 minutes before a meal.   Quantity:  90 capsule   Refills:  3        tamsulosin 0.4 MG capsule   Commonly known as:  FLOMAX   Used for:  Benign non-nodular prostatic hyperplasia with lower urinary tract symptoms        Dose:  0.4 mg   Take 1 capsule (0.4 mg) by mouth daily   Quantity:  90 capsule   Refills:  3        * Notice:  This list has 3 medication(s) that are the same as other medications prescribed for you. Read the directions carefully, and ask your doctor or other care provider to review them with you.      CONTINUE these medications which have NOT CHANGED        Dose / Directions Comments    aspirin 81 MG EC tablet   Used for:  Transient cerebral ischemia, unspecified type        Dose:  81 mg   Take 1 tablet (81 mg) by mouth daily   Quantity:  30 tablet   Refills:  0        blood glucose monitoring test strip   Commonly known as:  no brand specified   Used for:  Hypoglycemia        Use to test blood sugar three times daily or as directed.   Quantity:  300 each   Refills:  3    True Metrix strips or brand desired by insurance       HYDROcodone-acetaminophen 5-325 MG per tablet   Commonly known as:  NORCO        Dose:  1 tablet   Take 1 tablet by mouth every 4 hours as needed for moderate to severe pain   Refills:  0        order for DME   Used for:  Type 2 diabetes mellitus without complication (H)        Equipment being ordered: True Matrix Blood Glucose meter.   Quantity:  1 Device  "  Refills:  0        PLAVIX PO   Used for:  Cough        Dose:  75 mg   Take 75 mg by mouth daily   Refills:  0                After Care     Activity       Your activity upon discharge: activity as tolerated.  May shower.       Diet       Follow this diet upon discharge: Orders Placed This Encounter      Room Service      Advance Diet as Tolerated: Regular             Follow-Up Appointment Instructions     Follow-up and recommended labs and tests        Follow up with me,  Roland Rodriguez, within 2 weeks. to evaluate after surgery.  The following labs/tests are recommended: Carotid Duplex in 4 weeks..                                                 INTERAGENCY TRANSFER FORM - NURSING   12/31/2017                       [place] [address] SURGICAL SPECIALITIES: [phone]            Attending Provider: Meseret Tidwell MD     Allergies:  Oxycodone, Sulfa Drugs, Tetracycline    Infection:  None   Service:  HOSPITALIST    Ht:  1.727 m (5' 8\")   Wt:  74.4 kg (164 lb 0.4 oz)   Admission Wt:  74.7 kg (164 lb 11.2 oz)    BMI:  24.94 kg/m 2   BSA:  1.89 m 2            Advance Directives        Does patient have a scanned Advance Directive/ACP document in EPIC?           No        Immunizations     Name Date      Influenza (High Dose) 3 valent vaccine 11/03/17     Influenza (High Dose) 3 valent vaccine 10/16/16     Pneumo Conj 13-V (2010&after) 12/05/14     Pneumococcal 23 valent 01/01/00     TD (ADULT, 7+) 11/01/10     Zoster vaccine, live 01/01/12       ASSESSMENT     Discharge Profile Flowsheet     EXPECTED DISCHARGE     Resources List  -- (n/a) 12/07/17 1619    Expected Discharge Date  01/04/18 (TCU pending postop evals) 01/03/18 0924   Other Resources  -- (NA) 12/07/17 1619    DISCHARGE NEEDS ASSESSMENT     PAS Number  -- (n/a) 12/07/17 1619    Concerns To Be Addressed  -- (NA) 07/14/17 1612   Senior Linkage Line Referral Placed  -- (n/a) 12/07/17 1619    Concerns Comments  -- (NA) 07/14/17 1612   F/U " "Appointment Brochure Provided  -- (NA) 12/07/17 1619    Equipment Currently Used at Home  cane, straight;walker, rolling 01/01/18 1437   Referrals Placed  Senior Linkage Line 12/07/17 1619    Transportation Available  family or friend will provide 01/01/18 1437   SKIN      # of Referrals Placed by CTS  Senior Linkage Line 01/04/18 1108   Inspection of bony prominences  Procedural focused assessment (identify areas inspected) 01/03/18 2311    Does Patient Need a Referral for Clinic CC  Yes 12/29/16 1100   Skin WDL  ex 01/04/18 1032    GASTROINTESTINAL (ADULT,PEDIATRIC,OB)     Skin Color/Characteristics  bruised (ecchymotic) 01/04/18 1032    GI WDL  WDL 01/04/18 1032   Skin Temperature  warm 01/04/18 1032    All Quadrants Bowel Sounds  hypoactive 01/04/18 0341   Skin Integrity  bruise(s);incision(s) 01/04/18 1032    Last Bowel Movement  12/31/17 01/01/18 1813   Skin Moisture  flaky;dry 01/04/18 0632    Passing flatus  yes 01/04/18 1032   Skin Elasticity  quick return to original state 01/03/18 0936    COMMUNICATION ASSESSMENT     SAFETY      Patient's communication style  spoken language (English or Bilingual) 12/31/17 1123   Safety WDL  WDL 01/04/18 1032    FINAL RESOURCES     All Alarms  alarm(s) activated and audible 01/04/18 1032                 Assessment WDL (Within Defined Limits) Definitions           Safety WDL     Effective: 09/28/15    Row Information: <b>WDL Definition:</b> Bed in low position, wheels locked; call light in reach; upper side rails up x 2; ID band on<br> <font color=\"gray\"><i>Item=AS safety wdl>>List=AS safety wdl>>Version=F14</i></font>      Skin WDL     Effective: 09/28/15    Row Information: <b>WDL Definition:</b> Warm; dry; intact; elastic; without discoloration; pressure points without redness<br> <font color=\"gray\"><i>Item=AS skin wdl>>List=AS skin wdl>>Version=F14</i></font>      Vitals     Vital Signs Flowsheet     VITAL SIGNS     Height  1.727 m (5' 8\") 12/31/17 1613    Temp  97.9 "  F (36.6  C) 01/04/18 1145   Height Method  Stated (per pt's wife ) 12/31/17 1613    Temp src  Oral 01/04/18 1145   Weight  74.4 kg (164 lb 0.4 oz) 01/04/18 0701    Resp  16 01/04/18 1145   Weight Method  Bed scale 01/04/18 0701    Pulse  73 01/04/18 1145   BSA (Calculated - sq m)  1.89 12/31/17 1613    Heart Rate  68 01/04/18 0803   BMI (Calculated)  25.09 12/31/17 1613    Pulse/Heart Rate Source  Monitor 01/04/18 1145   POSITIONING      BP  96/48 01/04/18 1145   Body Position  independently positioning 01/04/18 1032    BP Location  Right arm 01/04/18 1145   Head of Bed (HOB)  HOB at 20-30 degrees 01/03/18 2245    OXYGEN THERAPY     DAILY CARE      SpO2  94 % 01/04/18 1145   Activity Management  activity adjusted per tolerance 01/04/18 1032    O2 Device  None (Room air) 01/04/18 1145   Activity Assistance Provided  assistance, 1 person 01/04/18 0342    Oxygen Delivery  2 LPM 01/04/18 0803   ECG      RESPIRATORY MONITORING     ECG Rhythm  Sinus rhythm 01/03/18 2245    Respiratory Monitoring (EtCO2)  38 mmHg 01/04/18 0136   ANALGESIA SIDE EFFECTS MONITORING      Integrated Pulmonary Index (IPI)  8-9 01/04/18 0136   Side Effects Monitoring: Respiratory Quality  R 01/04/18 1025    PAIN/COMFORT     Side Effects Monitoring: Respiratory Depth  N 01/04/18 1025    Patient Currently in Pain  denies 01/04/18 1025   Side Effects Monitoring: Sedation Level  1 01/04/18 1025    HEIGHT AND WEIGHT                   Patient Lines/Drains/Airways Status    Active LINES/DRAINS/AIRWAYS     Name: Placement date: Placement time: Site: Days: Last dressing change:    Closed/Suction Drain 1 Left Neck Bulb 15 [place] 01/03/18   1859   Neck   less than 1     Peripheral IV 01/03/18 Right Lower forearm 01/03/18   1539   Lower forearm   less than 1     Arterial Line 01/03/18 01/03/18   1553      less than 1     Incision/Surgical Site 06/04/17 Left Foot 06/04/17       214     Incision/Surgical Site 06/06/17 Left Foot 06/06/17   1922    211      Incision/Surgical Site 01/03/18 Right Neck 01/03/18   1725    less than 1             Patient Lines/Drains/Airways Status    Active PICC/CVC     None            Intake/Output Detail Report     Date Intake       Output   Net    Shift P.O. I.V. IV Piggyback Colloid Total Urine Drains Total       Noc 01/02/18 2300 - 01/03/18 0659 -- 3 -- -- 3 -- -- -- 3    Day 01/03/18 0700 - 01/03/18 1459 -- -- -- -- -- -- -- -- 0    Charisse 01/03/18 1500 - 01/03/18 2259 -- 1900 -- 500 2400 350 -- 350 2050    Noc 01/03/18 2300 - 01/04/18 0659 250 568 -- -- 818 350 25 375 443    Day 01/04/18 0700 - 01/04/18 1459 500 -- -- -- 500 -- -- -- 500      Last Void/BM       Most Recent Value    Urine Occurrence 1 at 01/04/2018 1145    Stool Occurrence       Case Management/Discharge Planning     Case Management/Discharge Planning Flowsheet     REFERRAL INFORMATION     Major Change/Loss/Stressor  none 12/31/17 1504    Did the Initial Social Work Assessment result in a Social Work Case?  Yes 01/02/18 1603   Patient Personal Strengths  able to adapt 06/06/17 1517    Admission Type  inpatient 01/02/18 1603   EXPECTED DISCHARGE      Arrived From  home or self-care 01/02/18 1603   Expected Discharge Date  01/04/18 (TCU pending postop evals) 01/03/18 0924    Referral Source  physician 01/02/18 1603   ASSESSMENT/CONCERNS TO BE ADDRESSED      # of Referrals Placed by CTS  Senior Linkage Line 01/04/18 1108   Concerns To Be Addressed  -- (NA) 07/14/17 1612    Post Acute Facilities  TCU 01/03/18 0926   Concerns Comments  -- (NA) 07/14/17 1612    Reason For Consult  discharge planning 01/02/18 1603   DISCHARGE PLANNING      Record Reviewed  medical record;patient profile 01/02/18 1603   Transportation Available  family or friend will provide 01/01/18 1437    CTS Assigned to Case  Maira Alvarez  01/04/18 1108   Does Patient Need a Referral for Clinic CC  Yes 12/29/16 1100    Primary Care Clinic Name  [facility]  06/09/17 1220   FINAL RESOURCES      Primary Care  MD Name  Matt Morrissey  06/09/17 1343   Equipment Currently Used at Home  cane, straight;walker, rolling 01/01/18 1437    LIVING ENVIRONMENT     Resources List  -- (n/a) 12/07/17 1619    Lives With  significant other 01/02/18 1603   Other Resources  -- (NA) 12/07/17 1619    Living Arrangements  house 01/02/18 1603   PAS Number  -- (n/a) 12/07/17 1619    Provides Primary Care For  no one 01/02/18 1603   Senior Linkage Line Referral Placed  -- (n/a) 12/07/17 1619    ASSESSMENT OF FAMILY/SOCIAL SUPPORT     F/U Appointment Brochure Provided  -- (NA) 12/07/17 1619    Marital Status  Lives with Significant Other 01/02/18 1603   Referrals Placed  Senior Linkage Line 12/07/17 1619    Who is your support system?  Significant Other;Neighbor 01/02/18 1603   ABUSE RISK SCREEN      Significant Other's Name  Halina 01/02/18 1603   QUESTION TO PATIENT:  Has a member of your family or a partner(now or in the past) intimidated, hurt, manipulated, or controlled you in any way?  patient declined to answer or is unable to answer 12/31/17 1157    Description of Support System  Supportive;Involved 01/02/18 1603   QUESTION TO PATIENT: Do you feel safe going back to the place where you are living?  patient declined to answer or is unable to answer 12/31/17 1157    Support Assessment  Adequate family and caregiver support;Adequate social supports 01/02/18 1603   (R) MENTAL HEALTH SUICIDE RISK      COPING/STRESS     Are you depressed or being treated for depression?  Yes 12/31/17 1501                  [facility] SURGICAL SPECIALITIES: [phone]            Medication Administration Report for Dustin Pearce as of 01/04/18 1154   Legend:    Given Hold Not Given Due Canceled Entry Other Actions    Time Time (Time) Time  Time-Action       Inactive    Active    Linked        Medications 12/29/17 12/30/17 12/31/17 01/01/18 01/02/18 01/03/18 01/04/18    0.9% sodium chloride infusion  Rate: 70 mL/hr Freq: CONTINUOUS Route: IV  Start:  01/03/18 2245   Admin Instructions: Saline lock when taking PO fluids.          2238 ( )-New Bag            acetaminophen (TYLENOL) tablet 650 mg  Dose: 650 mg Freq: EVERY 4 HOURS PRN Route: PO  PRN Reason: other  PRN Comment: surgical pain  Start: 01/03/18 2232   Admin Instructions: May give first dose 4 hours after last scheduled dose acetaminophen.  Maximum acetaminophen dose from all sources = 75 mg/kg/day not to exceed 4 grams/day.           0640 (650 mg)-Given           aspirin EC EC tablet 325 mg  Dose: 325 mg Freq: DAILY Route: PO  Start: 12/31/17 1400   Admin Instructions: On admission and daily.               0917 (325 mg)-Given        0849 (325 mg)-Given        0845 (325 mg)-Given       1448-Auto Hold       2225-Unhold        0857 (325 mg)-Given          Or  aspirin Suppository 300 mg  Dose: 300 mg Freq: DAILY Route: RE  Start: 12/31/17 1400   Admin Instructions: On admission and daily. If unable to take PO.       (1450)-Not Given [C]                               1448-Auto Hold       2225-Unhold                   atorvastatin (LIPITOR) tablet 80 mg  Dose: 80 mg Freq: DAILY Route: PO  Start: 12/31/17 1730      1750 (80 mg)-Given        0918 (80 mg)-Given        0849 (80 mg)-Given        0845 (80 mg)-Given       1448-Auto Hold       2225-Unhold        0856 (80 mg)-Given           benzocaine-menthol (CHLORASEPTIC) 6-10 MG lozenge 1-2 lozenge  Dose: 1-2 lozenge Freq: EVERY 1 HOUR PRN Route: BU  PRN Reason: sore throat  PRN Comment: without fever  Start: 01/03/18 2232              DULoxetine (CYMBALTA) EC capsule 30 mg  Dose: 30 mg Freq: DAILY Route: PO  Start: 12/31/17 1730      1750 (30 mg)-Given        0918 (30 mg)-Given        0849 (30 mg)-Given        0845 (30 mg)-Given       1448-Auto Hold       2225-Unhold        0857 (30 mg)-Given           exenatide (BYETTA) 5 mcg/0.02mL per dose (60 doses per 1.2 mL prefilled pen)  Dose: 5 mcg Freq: 2 TIMES DAILY BEFORE MEALS Route: SC  Start: 01/03/18 1630   Admin  Instructions: Administer within a 60 minute period prior to meal. Do not administer after the meal. For SQ use only. *Refrigerate*.          1448-Auto Hold       1630-Automatically Held       2225-Unhold        0900 (5 mcg)-Given       [ ] 1630           glucose 40 % gel 15-30 g  Dose: 15-30 g Freq: EVERY 15 MIN PRN Route: PO  PRN Reason: low blood sugar  Start: 12/31/17 1357   Admin Instructions: Give 15 g for BG 51 to 69 mg/dL IF patient is conscious and able to swallow. Give 30 g for BG less than or equal to 50 mg/dL IF patient is conscious and able to swallow. Do NOT give glucose gel via enteral tube.  IF patient has enteral tube: give apple juice 120 mL (4 oz or 15 g of CHO) via enteral tube for BG 51 to 69 mg/dL.  Give apple juice 240 mL (8 oz or 30 g of CHO) via enteral tube for BG less than or equal to 50 mg/dL.    ~Oral gel is preferable for conscious and able to swallow patient.   ~IF gel unavailable or patient refuses may provide apple juice 120 mL (4 oz or 15 g of CHO). Document juice on I and O flowsheet.          1448-Auto Hold       2225-Unhold           Or  dextrose 50 % injection 25-50 mL  Dose: 25-50 mL Freq: EVERY 15 MIN PRN Route: IV  PRN Reason: low blood sugar  Start: 12/31/17 1357   Admin Instructions: Use if have IV access, BG less than 70 mg/dL and meet dose criteria below:  Dose if conscious and alert (or disorientated) and NPO = 25 mL  Dose if unconscious / not alert = 50 mL  Vesicant. For ordered doses up to 25 mg, give IV Push undiluted. Give each 5g over 1 minute.          1448-Auto Hold       2225-Unhold           Or  glucagon injection 1 mg  Dose: 1 mg Freq: EVERY 15 MIN PRN Route: SC  PRN Reason: low blood sugar  PRN Comment: May repeat x 1 only  Start: 12/31/17 1357   Admin Instructions: May give SQ or IM. ONLY use glucagon IF patient has NO IV access AND is UNABLE to swallow AND blood glucose is LESS than or EQUAL to 50 mg/dL.  Give IV Push over 1 minute. Reconstitute with 1mL  sterile water.          1448-Auto Hold       2225-Unhold            heparin lock flush 10 UNIT/ML injection 5-10 mL  Dose: 5-10 mL Freq: EVERY 1 HOUR PRN Route: IK  PRN Reason: other  PRN Comment: to lock each CVC - Open Ended (Tunneled and Non-Tunneled) dormant lumen.  Start: 01/04/18 0456   Admin Instructions: MAX: 5 mL per lumen.               heparin lock flush 10 UNIT/ML injection 5-10 mL  Dose: 5-10 mL Freq: EVERY 24 HOURS Route: IK  Start: 01/04/18 0600   Admin Instructions: To lock each CVC - Open Ended (Tunneled and Non-Tunneled) dormant lumen. Check PRN heparin flush order to see when last dose of PRN heparin was given before administering.  MAX: 5 mL per lumen..           0643 (10 mL)-Given [C]           insulin aspart (NovoLOG) inj (RAPID ACTING)  Dose: 1-5 Units Freq: AT BEDTIME Route: SC  Start: 12/31/17 2200   Admin Instructions: MEDIUM INSULIN RESISTANCE DOSING    Do Not give Bedtime Correction Insulin if BG less than  200.   For  - 249 give 1 units.   For  - 299 give 2 units.   For  - 349 give 3 units.   For  -399 give 4 units.   For BG greater than or equal to 400 give 5 units.  Notify provider if glucose greater than or equal to 350 mg/dL after administration of correction dose.  If given at mealtime, must be administered 5 min before meal or immediately after.       (2140)-Not Given [C]        (2306)-Not Given        2153 (2 Units)-Given        1448-Auto Hold       2200-Automatically Held       2225-Unhold        [ ] 2200           insulin aspart (NovoLOG) inj (RAPID ACTING)  Dose: 1-7 Units Freq: 3 TIMES DAILY BEFORE MEALS Route: SC  Start: 12/31/17 1730   Admin Instructions: Correction Scale - MEDIUM INSULIN RESISTANCE DOSING     Do Not give Correction Insulin if Pre-Meal BG less than 140.   For Pre-Meal  - 189 give 1 unit.   For Pre-Meal  - 239 give 2 units.   For Pre-Meal  - 289 give 3 units.   For Pre-Meal  - 339 give 4 units.   For Pre-Meal BG  "340- 399 give 5 units.   For Pre-Meal -449 give 6 units  For Pre-Meal BG greater than or equal to 450 give 7 units.   To be given with prandial insulin, and based on pre-meal blood glucose.    Notify provider if glucose greater than or equal to 350 mg/dL after administration of correction dose.  If given at mealtime, must be administered 5 min before meal or immediately after.       1754 (1 Units)-Given [C]        0918 (1 Units)-Given       1321 (1 Units)-Given       1809 (1 Units)-Given        (0845)-Not Given       1311 (2 Units)-Given       1806 (3 Units)-Given        (0845)-Not Given       1323 (2 Units)-Given       1448-Auto Hold       1700-Automatically Held       2225-Unhold        0949 (1 Units)-Given [C]       [ ] 1200       [ ] 1700           insulin glargine (LANTUS) injection 12 Units  Dose: 12 Units Freq: DAILY Route: SC  Start: 01/04/18 0900         1448-Auto Hold       2225-Unhold        0900 (12 Units)-Given           ipratropium (ATROVENT) 0.03 % spray 2 spray  Dose: 2 spray Freq: EVERY 12 HOURS Route: BOTH NOSTRIL  Start: 12/31/17 1400      1722 (2 spray)-Given        0923 (2 spray)-Given       1811 (2 spray)-Given        0734 (2 spray)-Given       1903 (2 spray)-Given        0545 (2 spray)-Given       1448-Auto Hold       1800-Automatically Held       2225-Unhold        0655 (2 spray)-Given       [ ] 1800           lidocaine (LMX4) cream  Freq: EVERY 1 HOUR PRN Route: Top  PRN Reason: pain  PRN Comment: with VAD insertion or accessing implanted port.  Start: 01/03/18 2232   Admin Instructions: Do NOT give if patient has a history of allergy to any local anesthetic or any \"jonh\" product.   Apply 30 minutes prior to VAD insertion or port access.  MAX Dose:  2.5 g (  of 5 g tube)               lidocaine (LMX4) cream  Freq: EVERY 1 HOUR PRN Route: Top  PRN Reason: pain  PRN Comment: with VAD insertion or accessing implanted port.  Start: 12/31/17 1356   Admin Instructions: Do NOT give if " "patient has a history of allergy to any local anesthetic or any \"jonh\" product.   Apply 30 minutes prior to VAD insertion or port access.  MAX Dose:  2.5 g (  of 5 g tube)          1448-Auto Hold       2225-Unhold            Medication Instruction  Freq: CONTINUOUS PRN Route: XX  Start: 12/31/17 1356   Admin Instructions: No D5W IV solutions unless patient hypoglycemic.  Pharmacy to remove all dextrose from iv fluid orders as able.               Medication Instruction  Freq: CONTINUOUS PRN Route: XX  Start: 12/31/17 1356   Admin Instructions: No D5W IV solutions unless patient hypoglycemic.  Pharmacy to remove all dextrose from iv fluid orders as able.               metoprolol (TOPROL-XL) half-tab 12.5 mg  Dose: 12.5 mg Freq: DAILY Route: PO  Indications of Use: HYPERTENSION  Start: 12/31/17 1730   Admin Instructions: DO NOT CRUSH. Tablet may be split in half along score line.  Hold if SBP< 110 or HR< 65       1753 (12.5 mg)-Given        0917 (12.5 mg)-Given        0848 (12.5 mg)-Given        0845 (12.5 mg)-Given       1448-Auto Hold       2225-Unhold        (0856)-Not Given           naloxone (NARCAN) injection 0.1-0.4 mg  Dose: 0.1-0.4 mg Freq: EVERY 2 MIN PRN Route: IV  PRN Reason: opioid reversal  Start: 12/31/17 1356   Admin Instructions: For respiratory rate LESS than or EQUAL to 8.  Partial reversal dose:  0.1 mg titrated q 2 minutes for Analgesia Side Effects Monitoring Sedation Level of 3 (frequently drowsy, arousable, drifts to sleep during conversation).Full reversal dose:  0.4 mg bolus for Analgesia Side Effects Monitoring Sedation Level of 4 (somnolent, minimal or no response to stimulation).  For ordered doses up to 2mg give IVP. Give each 0.4mg over 15 seconds in emergency situations. For non-emergent situations further dilute in 9mL of NS to facilitate titration of response.          1448-Auto Hold       2225-Unhold            omeprazole (priLOSEC) CR capsule 40 mg  Dose: 40 mg Freq: EVERY MORNING " BEFORE BREAKFAST Route: PO  Start: 01/03/18 0915         0915 (40 mg)-Given       1448-Auto Hold       2225-Unhold        0640 (40 mg)-Given           ondansetron (ZOFRAN-ODT) ODT tab 4 mg  Dose: 4 mg Freq: EVERY 6 HOURS PRN Route: PO  PRN Reasons: nausea,vomiting  Start: 12/31/17 1357   Admin Instructions: This is Step 1 of nausea and vomiting management.  If nausea not resolved in 15 minutes, go to Step 2 prochlorperazine (COMPAZINE). Do not push through foil backing. Peel back foil and gently remove. Place on tongue immediately. Administration with liquid unnecessary          1448-Auto Hold              2225-Unhold           Or  ondansetron (ZOFRAN) injection 4 mg  Dose: 4 mg Freq: EVERY 6 HOURS PRN Route: IV  PRN Reasons: nausea,vomiting  Start: 12/31/17 1357   Admin Instructions: This is Step 1 of nausea and vomiting management.  If nausea not resolved in 15 minutes, go to Step 2 prochlorperazine (COMPAZINE).  Irritant. For ordered doses up to 4 mg, give IV Push undiluted over 2-5 minutes.          1448-Auto Hold       1916 (4 mg)-Given       2225-Unhold            sodium chloride (PF) 0.9% PF flush 10 mL  Dose: 10 mL Freq: ONCE Route: IV  Start: 01/01/18 1045       (1318)-Not Given         1448-Auto Hold       2225-Unhold            sodium chloride (PF) 0.9% PF flush 3 mL  Dose: 3 mL Freq: EVERY 8 HOURS Route: IK  Start: 01/03/18 2245   Admin Instructions: And Q1H PRN, to lock peripheral IV dormant line.          (7450)-Not Given        (0534)-Not Given       [ ] 1400       [ ] 2200           sodium chloride (PF) 0.9% PF flush 3 mL  Dose: 3 mL Freq: EVERY 1 HOUR PRN Route: IK  PRN Reason: line flush  PRN Comment: for peripheral IV flush post IV meds  Start: 01/03/18 2232              tamsulosin (FLOMAX) capsule 0.4 mg  Dose: 0.4 mg Freq: DAILY Route: PO  Start: 12/31/17 1730   Admin Instructions: Administer 30 minutes after the same meal each day.  Capsules should be swallowed whole; do not crush chew or open.        1753 (0.4 mg)-Given        0918 (0.4 mg)-Given        0848 (0.4 mg)-Given        0845 (0.4 mg)-Given       1448-Auto Hold       2225-Unhold        0857 (0.4 mg)-Given           traMADol (ULTRAM) tablet 50 mg  Dose: 50 mg Freq: EVERY 6 HOURS PRN Route: PO  PRN Reason: moderate to severe pain  Start: 01/03/18 2232   Admin Instructions: Hold while on PCA or with regular IV opioid dosing.              Completed Medications  Medications 12/29/17 12/30/17 12/31/17 01/01/18 01/02/18 01/03/18 01/04/18         Dose: 12.5 g Freq: ONCE Route: IV  Start: 01/03/18 2030   End: 01/03/18 2017   Admin Instructions: Infusion rates should be adjusted based on patient condition and response.  - For shock/hypovolemia, infuse as rapidly as tolerated. For patients with cardiac or circulatory disease at risk for rapid blood pressure increases, do not exceed 5-10 mL/min.   - For patients with normal plasma volume, do not exceed 1-2 mL/min to avoid circulatory overload and pulmonary edema.   - For procedure specific rates, please refer to procedure protocol.          2017 (12.5 g)-New Bag              Dose: 600 mg Freq: ONCE Route: RE  Start: 01/03/18 2000   End: 01/03/18 2000   Admin Instructions: In recovery room.          2000 (600 mg)-Given              Dose: 2 g Freq: ONCE Route: IV  Indications of Use: PERIOPERATIVE PHARMACOPROPHYLAXIS  Start: 01/03/18 1615   End: 01/03/18 1640         1605 (2 g)-Handoff       1640 (2 g)-Given              Dose: 3 Units Freq: ONCE Route: SC  Start: 01/03/18 1115   End: 01/03/18 1323         1323 (3 Units)-Given              Dose: 3 Units Freq: ONCE Route: SC  Start: 01/02/18 1030   End: 01/02/18 1311        1311 (3 Units)-Given            Discontinued Medications  Medications 12/29/17 12/30/17 12/31/17 01/01/18 01/02/18 01/03/18 01/04/18         Rate: 10 mL/hr Freq: CONTINUOUS Route: IV  Last Dose: 500 mL (01/03/18 2210)  Start: 01/03/18 2215   End: 01/04/18 0823   Admin Instructions: IF patient  on dialysis.          2210 (500 mL)-New Bag        0823-Med Discontinued         Dose: 12.5 g Freq: ONCE Route: IV  Start: 01/03/18 2130   End: 01/03/18 2225   Admin Instructions: Infusion rates should be adjusted based on patient condition and response.  - For shock/hypovolemia, infuse as rapidly as tolerated. For patients with cardiac or circulatory disease at risk for rapid blood pressure increases, do not exceed 5-10 mL/min.   - For patients with normal plasma volume, do not exceed 1-2 mL/min to avoid circulatory overload and pulmonary edema.   - For procedure specific rates, please refer to procedure protocol.                 2225-Med Discontinued          Dose: 81 mg Freq: DAILY Route: PO  Start: 01/01/18 1545   End: 01/01/18 1545   Admin Instructions: DO NOT CRUSH.               1545-Med Discontinued            Freq: PRN  Start: 01/03/18 1910   End: 01/03/18 2225         1910 (30 mL)-Given       2225-Med Discontinued          Dose: 75 mg Freq: DAILY Route: PO  Start: 12/31/17 1730   End: 01/02/18 1810      1754 (75 mg)-Given        0918 (75 mg)-Given        0849 (75 mg)-Given       1810-Med Discontinued           Freq: PRN  Start: 01/03/18 1650   End: 01/03/18 2225         1650 (252.5 mL)-Given [C]       2225-Med Discontinued          Dose: 0.2 mg Freq: EVERY 2 HOURS PRN Route: IV  PRN Reason: other  PRN Comment: pain control or improvement in physical function. Hold dose for analgesic side effects.  Start: 01/03/18 2232   End: 01/04/18 0823   Admin Instructions: Start at the lowest dose.  May adjust dose by 0.1 mg every 2 hours as needed.   Notify provider to assess for uncontrolled pain or analgesic side effects.  Hold while on IV PCA or with regular IV opioid dosing.  For ordered doses up to 4 mg give IV Push undiluted. Administer each 2mg over 2-5 minutes.           0823-Med Discontinued         Dose: 5 Units Freq: DAILY Route: SC  Start: 01/01/18 0900   End: 01/02/18 1018       0918 (5 Units)-Given         0848 (5 Units)-Given       1018-Med Discontinued           Dose: 8 Units Freq: DAILY Route: SC  Start: 01/03/18 0900   End: 01/03/18 1109         0845 (8 Units)-Given       1109-Med Discontinued          Dose: 10 mg Freq: EVERY 10 MIN PRN Route: IV  PRN Reason: high blood pressure  PRN Comment: Systolic Blood Pressure greater than 180 mmHg or Diastolic Blood Pressure greater than 90 mmHg.  Start: 01/03/18 2232   End: 01/04/18 0823   Admin Instructions: Repeat or double dose every 10 minutes up to 300 mg cumulative dose.  For ordered doses up to 80 mg, give IV Push undiluted. Give each 20 mg over 2 minutes.           0823-Med Discontinued         Dose: 10-40 mg Freq: EVERY 10 MIN PRN Route: IV  PRN Reasons: high blood pressure,other  PRN Comment: for Systolic Blood Pressure greater than 200 mmHg or Diastolic Blood Pressure greater than 100 mmHg or Mean Arterial Pressure greater than 100 mmHg  Start: 12/31/17 1356   End: 01/04/18 0823   Admin Instructions: Use if Heart Rate 60 bpm or greater upon antihypertensive initiation.  Hold if Heart Rate less than 60 bpm.    Increase or repeat the dose if Blood Pressure goal not met.   Give 10 mg labetalol, wait 10 minutes.  If not effective then give an additional 10 mg of labetalol.  Wait 10 minutes.  If not effective then give 20 mg of labetalol. Wait 10 minutes.  If not effective give 20 mg of labetalol.  Wait 10 minutes. If not effective then give 40 mg of labetalol.     May add prn hydraLAZINE (APRESOLINE) [if ordered] if Systolic Blood Pressure parameter is not met after reaching labetalol 40 mg dose.      If hydrALAZINE (APRESOLINE) not ordered, may use enalaprilat (if ordered)   (MAX daily dose labetalol: 300 mg /24 hrs)   Notify provider within 1 hour if Blood Pressure parameters are not met.  For ordered doses up to 80 mg, give IV Push undiluted. Give each 20 mg over 2 minutes.          1448-Auto Hold       2225-Unhold        0823-Med Discontinued         Rate: 25  "mL/hr Freq: CONTINUOUS Route: IV  Start: 01/03/18 2215   End: 01/04/18 0823   Admin Instructions: IF patient NOT on dialysis.                  0823-Med Discontinued         Rate: 25 mL/hr Freq: CONTINUOUS Route: IV  Start: 01/03/18 1615   End: 01/03/18 1937   Admin Instructions: IF patient NOT on dialysis.          1606 ( )-New Bag       1903 ( )-Anesthesia Volume Adjustment       1937-Med Discontinued          Dose: 1 mL Freq: EVERY 1 HOUR PRN Route: OTHER  PRN Comment: mild pain with VAD insertion or accessing implanted port  Start: 01/03/18 2232   End: 01/04/18 0823   Admin Instructions: Do NOT give if patient has a history of allergy to any local anesthetic or any \"jonh\" product. MAX dose 1 mL subcutaneous OR intradermal in divided doses.           0823-Med Discontinued         Dose: 1 mL Freq: EVERY 1 HOUR PRN Route: OTHER  PRN Comment: mild pain with VAD insertion or accessing implanted port  Start: 01/03/18 1603   End: 01/03/18 1937   Admin Instructions: Do NOT give if patient has a history of allergy to any local anesthetic or any \"jonh\" product. MAX dose 1 mL subcutaneous OR intradermal in divided doses.          1937-Med Discontinued          Dose: 1 mL Freq: EVERY 1 HOUR PRN Route: OTHER  PRN Comment: mild pain with VAD insertion or accessing implanted port  Start: 12/31/17 1356   End: 01/04/18 0823   Admin Instructions: Do NOT give if patient has a history of allergy to any local anesthetic or any \"jonh\" product. MAX dose 1 mL subcutaneous OR intradermal in divided doses.          1448-Auto Hold       2225-Unhold        0823-Med Discontinued         Dose: 5 mg Freq: EVERY 6 HOURS PRN Route: PO  PRN Comment: nausea and vomiting  Start: 12/31/17 1357   End: 01/04/18 0823   Admin Instructions: This is Step 3 of nausea and vomiting management.  Give if nausea not resolved 15 minutes after giving prochlorperazine (COMPAZINE).  If nausea not resolved in 15-30 minutes, Notify provider.          1448-Auto " Hold       2225-Unhold        0823-Med Discontinued      Or    Dose: 5 mg Freq: EVERY 6 HOURS PRN Route: IV  PRN Comment: nausea and vomiting  Start: 12/31/17 1357   End: 01/04/18 0823   Admin Instructions: This is Step 3 of nausea and vomiting management.  Give if nausea not resolved 15 minutes after giving prochlorperazine (COMPAZINE).  If nausea not resolved in 15-30 minutes, Notify provider.  Avoid use if patient has full bowel obstruction or perforation. Irritant. For ordered doses up to 10 mg, give IV Push undiluted over 2 minutes.          1448-Auto Hold       2225-Unhold        0823-Med Discontinued         Dose: 0.1-0.4 mg Freq: EVERY 2 MIN PRN Route: IV  PRN Reason: opioid reversal  Start: 01/03/18 2232   End: 01/04/18 0823   Admin Instructions: For respiratory rate LESS than or EQUAL to 8.  Partial reversal dose:  0.1 mg titrated q 2 minutes for Analgesia Side Effects Monitoring Sedation Level of 3 (frequently drowsy, arousable, drifts to sleep during conversation).Full reversal dose:  0.4 mg bolus for Analgesia Side Effects Monitoring Sedation Level of 4 (somnolent, minimal or no response to stimulation).  For ordered doses up to 2mg give IVP. Give each 0.4mg over 15 seconds in emergency situations. For non-emergent situations further dilute in 9mL of NS to facilitate titration of response.           0823-Med Discontinued         Rate: 1.1-55.3 mL/hr Freq: CONTINUOUS PRN Route: IV  PRN Comment: START only if needed for Systolic Blood Pressure greater than 180 mmHg or Diastolic Blood Pressure greater than 90 mmHg.  Start: 01/03/18 2232   End: 01/04/18 0823   Admin Instructions: Start at lowest dose ordered. Titrate by 0.25 to 0.5 mcg/kg/min every 5 minutes to Systolic Blood Pressure 130 to 150 mmHg.  Max: 5 mcg/kg/min.   Notify provider if higher starting doses are initiated or if the titration reaches the upper range limit.  FOR ICU ONLY.  Conc: 0.4 mg/mL. Protect from light. Irritant.           0823-Med  Discontinued         Dose: 5 mg Freq: EVERY 6 HOURS PRN Route: IV  PRN Reasons: nausea,vomiting  Start: 12/31/17 1357   End: 01/04/18 0823   Admin Instructions: This is Step 2 of nausea and vomiting management. Give if nausea not resolved 15 minutes after giving ondansetron (ZOFRAN). If nausea not resolved in 15 minutes, go to Step 3 metoclopramide (REGLAN), if ordered.  For ordered doses up to 10 mg, give IV Push undiluted. Each 5mg over 1 minute.          1448-Auto Hold       2225-Unhold        0823-Med Discontinued      Or    Dose: 5 mg Freq: EVERY 6 HOURS PRN Route: PO  PRN Reason: vomiting  Start: 12/31/17 1357   End: 01/04/18 0823   Admin Instructions: This is Step 2 of nausea and vomiting management. Give if nausea not resolved 15 minutes after giving ondansetron (ZOFRAN). If nausea not resolved in 15 minutes, go to Step 3 metoclopramide (REGLAN), if ordered.          1448-Auto Hold       2225-Unhold        0823-Med Discontinued      Or    Dose: 12.5 mg Freq: EVERY 12 HOURS PRN Route: RE  PRN Reasons: nausea,vomiting  Start: 12/31/17 1357   End: 01/04/18 0823   Admin Instructions: This is Step 2 of nausea and vomiting management. Give if nausea not resolved 15 minutes after giving ondansetron (ZOFRAN). If nausea not resolved in 15 minutes, go to Step 3 metoclopramide (REGLAN), if ordered.          1448-Auto Hold       2225-Unhold        0823-Med Discontinued         Dose: 10-20 mL Freq: EVERY 1 HOUR PRN Route: IK  PRN Reasons: line flush,post meds or blood draw  Start: 01/04/18 0456   End: 01/04/18 0823   Admin Instructions: to flush CVC - Open Ended (Tunneled and Non-Tunneled).   10 mL post IV meds; 20 mL post blood draw.           0823-Med Discontinued         Dose: 3 mL Freq: EVERY 8 HOURS Route: IK  Start: 01/03/18 1615   End: 01/03/18 1937   Admin Instructions: And Q1H PRN, to lock peripheral IV dormant line.                 1937-Med Discontinued          Dose: 3 mL Freq: EVERY 8 HOURS Route: IK  Start:  12/31/17 1400   End: 01/04/18 0459   Admin Instructions: And Q1H PRN, to lock peripheral IV dormant line.              2145 (3 mL)-Given        (0923)-Not Given       (1501)-Not Given       2303 (3 mL)-Given        0708 (3 mL)-Given       1902 (3 mL)-Given       2102 (3 mL)-Given        0626 (3 mL)-Given              1448-Auto Hold       2200-Automatically Held       2225-Unhold        0459-Med Discontinued         Dose: 3 mL Freq: EVERY 1 HOUR PRN Route: IK  PRN Reason: line flush  PRN Comment: for peripheral IV flush post IV meds  Start: 12/31/17 1356   End: 01/04/18 0823         1448-Auto Hold       2225-Unhold        0823-Med Discontinued         Freq: PRN  Start: 01/03/18 1650   End: 01/03/18 2225         1650 (1,000 mL)-Given [C]       2225-Med Discontinued          Freq: PRN  Start: 01/03/18 1700   End: 01/03/18 2225         1700 (1,000 mL)-Given       2225-Med Discontinued     Medications 12/29/17 12/30/17 12/31/17 01/01/18 01/02/18 01/03/18 01/04/18               INTERAGENCY TRANSFER FORM - NOTES (H&P, Discharge Summary, Consults, Procedures, Therapies)   12/31/2017                       [place] [id number] [facility]: [phone]            History & Physicals     No notes of this type exist for this encounter.      Discharge Summaries     No notes of this type exist for this encounter.         Consult Notes      Consults by Jarod Alfaro MD at 1/3/2018  1:48 PM     Author:  Jarod Alfaro MD Service:  Vascular Medicine Author Type:  Physician    Filed:  1/3/2018  1:54 PM Date of Service:  1/3/2018  1:48 PM Creation Time:  1/3/2018  1:48 PM    Status:  Signed :  Jarod Alfaro MD (Physician)         [facility]    Vascular Medicine Consultation     Chief Complaint      Carotid endarterectomy right side  Left sided weakness    Date of Admission:  12/31/2017    Dustin Pearce is a 89 year old male who was admitted on 12/31/2017. I was asked to see  the patient for carotid endarterectomy for symptomatic less than 70% stenosis.    Code Status    Full code    Reason for Consult   Reason for consult: As above    Primary Care Physician   Matt Morrissey      History is obtained from the patient and his wife    History of Present Illness   Dustin Pearce is a 89 year old male who presents with left-sided weakness, history of fall, with mild cognitive impairment, carotid ultrasound showed evidence of 50-60% stenosis on the right side with neurological symptoms of the left side of the body, patient is diabetic his A1c is 8.2% patient is poorly controlled on metformin and glyburide, patient lipid profile is normal    Past Medical History   I have reviewed this patient's medical history and updated it with pertinent information if needed.[GE1.1]   Past Medical History:   Diagnosis Date     Aortic root dilatation (H)      Aortic stenosis      Benign essential hypertension 1/6/2017     Benign non-nodular prostatic hyperplasia with lower urinary tract symptoms 10/20/2016     CAD (coronary artery disease)     MI 1970     Cardiomyopathy (H)      Carotid artery stenosis 10/20/2016    chronically occluded left internal carotid artery     Carotid occlusion, left      Coronary artery disease involving native coronary artery of native heart without angina pectoris 10/20/2016     Diabetes mellitus (H)      DJD (degenerative joint disease)      Gastro-oesophageal reflux disease      Gastroesophageal reflux disease without esophagitis 10/20/2016     Heart attack      Hyperlipidemia      Hypertension      Mild cognitive impairment 10/20/2016     Nephrolithiasis     RECURRENT     Osteopenia      PAD (peripheral artery disease) (H)     peripheral angiogram 5/2017: The common iliac artery stenoses were stented and the superficial femoral artery stenoses were angioplastied     Paroxysmal atrial fibrillation (H)      PONV (postoperative nausea and vomiting)      RBBB with left anterior  fascicular block      Unspecified cerebral artery occlusion with cerebral infarction[GE1.2]        Past Surgical History   I have reviewed this patient's surgical history and updated it with pertinent information if needed.[GE1.1]  Past Surgical History:   Procedure Laterality Date     AMPUTATE FOOT Left 6/4/2017    Procedure: AMPUTATE FOOT;  Sun Add#3: partial left foot amputation - Sagital Saw - Mini C-Arm;  Surgeon: Moe Kirk DPM;  Location:  OR     CHOLECYSTECTOMY       ESOPHAGOSCOPY, GASTROSCOPY, DUODENOSCOPY (EGD), COMBINED N/A 12/26/2016    Procedure: COMBINED ESOPHAGOSCOPY, GASTROSCOPY, DUODENOSCOPY (EGD);  Surgeon: Nasim Louis MD;  Location:  GI     GENITOURINARY SURGERY      KIDNEY STONE REMOVAL X 4     HC UGI ENDOSCOPY W EUS N/A 12/29/2016    Procedure: COMBINED ENDOSCOPIC ULTRASOUND, ESOPHAGOSCOPY, GASTROSCOPY, DUODENOSCOPY (EGD);  Surgeon: Nasim Louis MD;  Location:  GI     HERNIA REPAIR       INCISION AND DRAINAGE FOOT, COMBINED Left 6/6/2017    Procedure: COMBINED INCISION AND DRAINAGE FOOT;  REVISIONAL LEFT FOOT INCISION AND DRAINAGE WITH POSSIBLE FLAP CLOSURE , ANTIBIOTIC SOLUTION ;  Surgeon: Nabil Ann DPM;  Location:  OR     LASER HOLMIUM LITHOTRIPSY URETER(S), INSERT STENT, COMBINED  3/2/2012    Procedure:COMBINED CYSTOSCOPY, URETEROSCOPY, LASER HOLMIUM LITHOTRIPSY URETER(S), INSERT STENT; CYSTOSCOPY, RIGHT RETROGRADES, RIGHT URETEROSCOPY, HOLMIUM LASER, RIGHT STENT PLACEMENT; Surgeon:ANJELICA LEÓN; Location: OR     ROTATOR CUFF REPAIR RT/LT[GE1.3]         Prior to Admission Medications   Prior to Admission Medications   Prescriptions Last Dose Informant Patient Reported? Taking?   AMITRIPTYLINE HCL PO 12/30/2017 at Unknown time Spouse/Significant Other Yes Yes   Sig: Take 25 mg by mouth nightly as needed for sleep   ATORVASTATIN CALCIUM PO 12/30/2017 at Unknown time Spouse/Significant Other Yes Yes   Sig: Take 80 mg by mouth daily    Clopidogrel Bisulfate, [id number], (PLAVIX PO) 12/30/2017 at Unknown time Spouse/Significant Other Yes Yes   Sig: Take 75 mg by mouth daily    DULoxetine (CYMBALTA) 30 MG EC capsule 12/30/2017 at Unknown time Spouse/Significant Other No Yes   Sig: Take 1 capsule (30 mg) by mouth daily   HYDROcodone-acetaminophen (NORCO) 5-325 MG per tablet  Spouse/Significant Other Yes Yes   Sig: Take 1 tablet by mouth every 4 hours as needed for moderate to severe pain   LISINOPRIL PO 12/30/2017 at Unknown time Spouse/Significant Other Yes Yes   Sig: Take 2.5 mg by mouth daily    METFORMIN HCL PO 12/30/2017 at Unknown time Spouse/Significant Other Yes Yes   Sig: Take 1,000 mg by mouth 2 times daily (with meals)   METOPROLOL SUCCINATE ER PO 12/30/2017 at Unknown time Spouse/Significant Other Yes Yes   Sig: Take 12.5 mg by mouth daily   Psyllium (METAMUCIL PO) 12/30/2017 at Unknown time Spouse/Significant Other Yes Yes   Sig: Take 1 packet by mouth daily as needed    aspirin EC 81 MG EC tablet 12/30/2017 at Unknown time Spouse/Significant Other No Yes   Sig: Take 1 tablet (81 mg) by mouth daily   blood glucose monitoring (NO BRAND SPECIFIED) test strip  Spouse/Significant Other No Yes   Sig: Use to test blood sugar three times daily or as directed.   glipiZIDE (GLUCOTROL) 10 MG tablet 12/30/2017 at Unknown time Spouse/Significant Other Yes Yes   Sig: Take 1 tablet (10 mg) by mouth 2 times daily (before meals)   ipratropium (ATROVENT) 0.03 % spray 12/30/2017 at Unknown time Spouse/Significant Other Yes Yes   Sig: Spray 2 sprays into both nostrils every 12 hours   metoprolol (TOPROL-XL) 25 MG 24 hr tablet 12/30/2017 at Unknown time Spouse/Significant Other No Yes   Sig: Take 0.5 tablets (12.5 mg) by mouth daily   omeprazole (PRILOSEC) 40 MG capsule 12/30/2017 at Unknown time Spouse/Significant Other No Yes   Sig: Take 1 capsule (40 mg) by mouth daily Take 30-60 minutes before a meal.   order for DME  Spouse/Significant Other No Yes  "  Sig: Equipment being ordered: [employer] Blood Glucose meter.   tamsulosin (FLOMAX) 0.4 MG capsule 12/30/2017 at Unknown time Spouse/Significant Other No Yes   Sig: Take 1 capsule (0.4 mg) by mouth daily      Facility-Administered Medications: None     Allergies   Allergies   Allergen Reactions     Oxycodone Other (See Comments)     \"TERRIBLE SWEATING\"     Sulfa Drugs      Tetracycline        Social History   I have reviewed this patient's social history and updated it with pertinent information if needed. Dustin Pearce[GE1.1]  reports that he quit smoking about 19 years ago. His smoking use included Cigarettes. He has a 30.00 pack-year smoking history. He has never used smokeless tobacco. He reports that he drinks about 4.2 oz of alcohol per week  He reports that he does not use illicit drugs.[GE1.2]    Family History   I have reviewed this patient's family history and updated it with pertinent information if needed.[GE1.1]   Family History   Problem Relation Age of Onset     Breast Cancer Mother      Heart Failure Father 81[GE1.2]       Review of Systems   The 10 point Review of Systems is negative other than noted in the HPI or here.     Physical Exam   Temp: 97.7  F (36.5  C) Temp src: Oral BP: 109/57 Pulse: 66 Heart Rate: 65 Resp: 16 SpO2: 93 % O2 Device: None (Room air)    Vital Signs with Ranges  Temp:  [97.6  F (36.4  C)-98.3  F (36.8  C)] 97.7  F (36.5  C)  Pulse:  [66-81] 66  Heart Rate:  [65-70] 65  Resp:  [14-16] 16  BP: ()/(44-67) 109/57  SpO2:  [93 %-97 %] 93 %  162 lbs 8 oz    Constitutional: awake, alert, cooperative, no apparent distress, and appears stated age  Eyes: Lids and lashes normal, pupils equal, round and reactive to light, extra ocular muscles intact, sclera clear, conjunctiva normal  ENT: normocepalic, without obvious abnormality, oropharynx pink and moist  Hematologic / Lymphatic: no lymphadenopathy  Respiratory: No increased work of breathing, good air exchange, clear to " auscultation bilaterally, no crackles or wheezing  Cardiovascular: regular rate and rhythm, normal S1 and S2 and no murmur noted  GI: Normal bowel sounds, soft, non-distended, non-tender  Skin: no redness, warmth, or swelling, no rashes and no lesions  Musculoskeletal: There is no redness, warmth, or swelling of the joints.  Full range of motion noted.  Motor strength is 5 out of 5 all extremities bilaterally.  Tone is normal.  Neurologic: Awake, alert, oriented to name, place and time.  Cranial nerves II-XII are grossly intact.  Motor is 5 out of 5 bilaterally.    Neuropsychiatric:  Normal affect, memory, insight.  Pulses: Palpable pulses bilateral and equal. No carotid bruits appreciated.     Data   Most Recent 3 CBC's:[GE1.1]  Recent Labs   Lab Test  01/01/18   0815  12/31/17   1130  09/28/17   1649   06/09/17   0535   WBC  5.3  9.6   --    --   7.9   HGB  12.1*  13.7  12.7*   < >  10.8*   MCV  93  94   --    --   93   PLT  126*  160   --    --   214    < > = values in this interval not displayed.[GE1.4]     Most Recent 3 BMP's:[GE1.1]  Recent Labs   Lab Test  01/01/18   0815  12/31/17   1425  12/31/17   1130  12/01/17   0928   NA  137   --   137  139   POTASSIUM  4.3  4.4  5.4*  5.9*   CHLORIDE  105   --   102  104   CO2  26   --   28  30   BUN  13   --   19  17   CR  0.66   --   0.73  0.83   ANIONGAP  6   --   7  5   ALLISON  8.2*   --   9.5  10.6*   GLC  156*  193*  222*  256*[GE1.4]     Most Recent 2 LFT's:[GE1.1]  Recent Labs   Lab Test  12/27/16   0650  12/25/16   0002   AST  43  19   ALT  50  21   ALKPHOS  90  58   BILITOTAL  0.7  0.6[GE1.4]     Most Recent 3 Creatinines:[GE1.1]  Recent Labs   Lab Test  01/01/18   0815  12/31/17   1130  12/01/17   0928   CR  0.66  0.73  0.83[GE1.4]     Most Recent 3 Hemoglobins:[GE1.1]  Recent Labs   Lab Test  01/01/18   0815  12/31/17   1130  09/28/17   1649   HGB  12.1*  13.7  12.7*[GE1.4]     Most Recent 3 BNP's:[GE1.1]No lab results found.[GE1.4]  Most Recent Hemoglobin  A1c:[GE1.1]  Recent Labs   Lab Test  12/31/17   1425   A1C  8.6*[GE1.4]     Most Recent Urinalysis:[GE1.1]  Recent Labs   Lab Test  01/20/17   1130   COLOR  Light Yellow   APPEARANCE  Clear   URINEGLC  Negative   URINEBILI  Negative   URINEKETONE  Negative   SG  1.040*   UBLD  Negative   URINEPH  6.5   PROTEIN  Negative   NITRITE  Negative   LEUKEST  Negative   RBCU  0   WBCU  1[GE1.4]     Most Recent ESR & CRP:[GE1.1]  Recent Labs   Lab Test  12/31/17   1425   05/31/17   1800   SED   --    --   43*   CRP  <2.9   < >  21.2*    < > = values in this interval not displayed.[GE1.4]         Assessment & Plan   (G45.1) TIA involving right internal carotid artery  (primary encounter diagnosis)  Comment: Patient is scheduled for right carotid endarterectomy today  Plan: Glucose by meter, Glucose by meter, Glucose by         meter, Glucose by meter, Glucose by meter,         Glucose by meter, Glucose by meter        Patient might benefit from addition of byetta 0.5 subcutaneous b.i.d. for 1 month  Diabetic education  Adherence to diabetic diet    (I65.21) Carotid stenosis, symptomatic w/o infarct, right  Comment: Patient scheduled for surgery today  Plan:     (I10) Essential hypertension  Comment: Controlled      (E11.69) Type 2 diabetes mellitus with other specified complication, without long-term current use of insulin (H)  Comment: As above  Plan: We'll continue to follow      Summary: Will follow up with the patient once he is back from surgery  Target LDL less than 70  Target blood pressure 130/80  Target A1c 6-6.5%    Jarod Alfaro MD[GE1.1]       Revision History        User Key Date/Time User Provider Type Action    > GE1.3 1/3/2018  1:54 PM Jarod Alfaro MD Physician Sign     GE1.4 1/3/2018  1:52 PM Jarod Alfaro MD Physician      GE1.2 1/3/2018  1:51 PM Jarod Alfaro MD Physician      GE1.1 1/3/2018  1:48 PM Jarod Alfaro MD Physician             Consults signed by Jennifer  Roland Kirkland MD at 1/3/2018 11:43 AM      Author:  Roland Rodriguez MD Service:  Surgery Author Type:  Physician    Filed:  1/3/2018 11:43 AM Date of Service:  1/2/2018  1:11 PM Creation Time:  1/2/2018  1:39 PM    Status:  Signed :  Roland Rodriguez MD (Physician)         HISTORY OF PRESENT ILLNESS:  Mr. Dustin Pearce was admitted on 01/01/2018.  He awoke with some confusion and unsteadiness that morning, feeling fine the morning before.  His speech was also noted to be slightly slurred by his wife, who lives with him.  He became more confused and unstable involving all extremities as the morning progressed.  He fell down and she called 911 and brought him to the Emergency Department.  Upon arrival of the paramedics, he was not bradycardic or hypotensive.  Symptoms lasted until later that day and have not resolved.      He has a past history significant for a left hemispheric cerebrovascular incident in 1999 with a known left internal carotid occlusion.  He had a somewhat similar episode in 01/2017 to the symptoms he had at admission, though they were much more pronounced yesterday.  The patient has a known stable moderate stenosis of the right internal carotid artery.      The patient has a history of cardiac problems.  He is on chronic aspirin and Plavix.  He has ischemic cardiomyopathy with echocardiogram on admission revealing ejection fraction of 35-40%, which is essentially unchanged.  The patient does not check his blood pressure at home.  He did have some signs of orthostatic hypotension when he was seeing the cardiologist more recently.  Wife reports that he had several episodes where he would become confused and have near syncopal type events, but again, the blood pressure was not checked to see if he was hypotensive or bradycardic.  He has had a Holter monitor in the past which was apparently unremarkable.      On his workup after upon this admission, he had a chronic occlusion of  the left internal carotid artery.  There was a 50% calcified stenosis but no obvious ulceration of the right internal carotid artery.  The right distal cervical and intracerebral portions of the internal carotid were quite tortuous, no high grade stenosis.  A fairly normal [place] [facility] [place] was noted.  MRI revealed old left frontal CVI with no acute changes.      We were asked to see the patient in vascular surgical consultation.      Past medical history is well documented in the chart.  He has underlying ischemic cardiomyopathy, hyperlipidemia, with most recent LDL on statins of 39, type 2 diabetes mellitus, hypertension, cerebral vascular event.      The patient also history of peripheral artery disease.  He developed a toe ulceration in 06/2017.  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  Prior to this, we found a critical stenosis of his left common iliac artery that was angioplastied and stented with good result.  Common femoral artery/profundus femoral artery were relatively normal.  Distal superficial femoral artery had 50-60% irregular stenosis.  An angioplasty was done on this with fairly good results.  Peroneal and posterior tibials are occluded at its origin, with the anterior tibial artery being relatively disease free to the ankle over where it occluded.  Multiple collaterals to the posterior tibial plantar branches and small distal and dorsalis pedis were noted.  The patient was able to heal the fifth ray amputation following this procedure with no recurrence.      At the time of that admission, we did perform a carotid duplex ultrasound of the right side revealing a 50-60% stenosis.  The peak systolic velocity of the internal carotid artery was 221 cm per second consistent with a 50% stenosis, especially considering the contralateral occlusion.      PHYSICAL EXAMINATION:   GENERAL:  The patient is examined in his room eating a late breakfast.  His wife is present.   VITAL SIGNS:  Blood  pressures remained stable through his hospitalization, with a low of 113/52 and a high of 135/75 today.  Heart rate has been in the mid 60s-75, with no evidence of bradycardia on monitoring.   HEENT:  Unremarkable.  No cervical masses.   CHEST:  Clear to auscultation.   CARDIOVASCULAR:  Regular rate, +3 femoral pulses noted bilaterally.  Pedal pulses not palpable, consistent with known PAD.  Both feet are warm and pink.  He has no distal edema and fairly normal sensation.  Well-healed left fifth ray amputation.      LABORATORY DATA:  I reviewed the CT and MRIs from admission.  Findings are as described above.      We performed a carotid duplex ultrasound this morning on the right side.  This revealed a stable approximately 50% stenosis.  Peak systolic velocity was unchanged at 228 cm per second with a ratio of 3.0.      IMPRESSION AND PLAN:   1.  More global neurological event with confusion, lightheadedness and falling.  Aphasia is somewhat more difficult to determine whether this was a focal problem.  The patient has chronic occlusion of the left internal carotid artery.  He has a stable moderate, non-hemodynamically significant stenosis of the right internal carotid which has not changed by duplex ultrasounds in the last 6 months, inconsistent with prior findings.  I do not feel that that the carotid disease is the cause of his symptoms.  Surgical intervention for carotid endarterectomy, angioplasty and stenting is not warranted since it will not improve his symptoms.  I am more concerned that he may have episodes of hypotension or bradycardia leading to global hyperperfusion causing his symptoms.  This needs to be evaluated further and this has been discussed with the patient and his wife.   2.  Peripheral artery disease.  He has strong femoral pulses following the left common iliac artery angioplasty and stenting earlier this year, with a healed amputation site.  Clinical followup is indicated.      I spent 25  minutes with the patient and his wife today discussing the situation.  Routine carotid duplex ultrasound on the right side would be performed in 1 year.  No need to visualize the left side due to chronic occlusion.         TAMIKO CUNNINGHAM MD             D: 2018 13:11   T: 2018 13:38   MT: TS      Name:     SID AGUIAR   MRN:      [id number]        Account:       [id number]   :      1928           Consult Date:  2018      Document: [id number]       cc: Tamiko Cunningham MD[WO1.1]        Revision History        User Key Date/Time User Provider Type Action    > WO1.1 1/3/2018 11:43 AM Tamiko Cunningham MD Physician Sign            Consults by Va Zelaya APRN CNP at 2018 12:01 PM     Author:  Va Zelaya APRN CNP Service:  Interventional Radiology Author Type:  Nurse Practitioner    Filed:  2018 12:01 PM Date of Service:  2018 12:01 PM Creation Time:  2018 11:39 AM    Status:  Signed :  Va Zelaya APRN CNP (Nurse Practitioner)         [facility] Interventional Neuroradiology:  Remote Consultation Note ~    We were asked to review imaging and provide consultation for possible carotid stenting.    HPI:  This is an 89 year old male admitted with stroke symptoms on 17.  Noninvasive imaging obtained.    Imaging:    MRI brain yesterday ~  Diffuse cerebral volume loss and cerebral white matter  changes consistent with chronic small vessel ischemic disease.  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 No evidence for acute intracranial pathology. No change from  the comparison study.    CTA head and neck on 17 ~  1. 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  2. Moderate atherosclerotic narrowing of the distal P2 segment of the  left posterior cerebral artery again noted without change.  3. Atherosclerotic plaque at the origin of the right internal  carotid  artery resulting in less than 50% luminal diameter stenosis again  noted.  4. No evidence for intracranial cerebral artery occlusion to explain  the patient's recent symptoms.  5. Overall, no change from the comparison study.    Carotid ultrasound today ~  50-69% diameter stenosis of the right ICA relative to the distal ICA  Diameter.    Impression:  This is an 89 year old male with a symptomatic right ICA stenosis. Chronic occlusion of left ICA.  Dr. SONIDO Davis reviewed above imaging today.      The CTA imaging appears to demonstrate no more than 50% stenosis of the right ICA.    The carotid ultrasound imaging is partially elevated secondary to contralateral occulusion.      Current CMS guidelines for carotid stenting require a symptomatic stenosis that is at least 80%.  It is very unlikely that the right ICA has stenosis that measures 80-99%.    Therefore the patient would not meet CMS criteria for carotid stenting.  If there is clinical concern we could do a formal catheter angiogram to confirm.    Thank you for asking us to review imaging and provide recommendation/consultation.  Please call our office with any questions or concerns.  [phone][CN1.1]    Va Zelaya[CN1.2], APRN-CNP[CN1.1]       Revision History        User Key Date/Time User Provider Type Action    > CN1.2 1/2/2018 12:01 PM Va Zelaya APRN CNP Nurse Practitioner Sign     CN1.1 1/2/2018 11:39 AM Va Zelaya APRN CNP Nurse Practitioner             Consults by Dee Chaney MD at 12/31/2017 12:29 PM     Author:  Dee Chaney MD Service:  Neurology Author Type:  Physician    Filed:  12/31/2017 12:29 PM Date of Service:  12/31/2017 12:29 PM Creation Time:  12/31/2017 12:19 PM    Status:  Signed :  Dee Chaney MD (Physician)         [facility]    Neurology Consultation     Date of Admission:  12/31/2017  Date of Consult (When I saw the patient):[KK1.1]  12/31/17[KK1.2]    Assessment & Plan   Dustin Pearce is a 89 year old male who was admitted on 12/31/2017. I was asked to see the patient for confusion and left neglect.  Clinically is right cortical stroke, likely small.  Out of window for IV tPA and no new large vessel cutoffs so no IA treatment.  Given IL asa 300 mg in ER.      #.CVA  #. Admit for stroke workup  --MRI brain: pending  --vessel imaging: cta head and neck 1. 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  2. Moderate atherosclerotic narrowing of the distal P2 segment of the  left posterior cerebral artery again noted without change.  3. Atherosclerotic plaque at the origin of the right internal carotid  artery resulting in less than 50% luminal diameter stenosis again  noted.  4. No evidence for intracranial cerebral artery occlusion to explain  the patient's recent symptoms.  5. Overall, no change from the comparison study.  --echo with bubble: pending  --ER EKG: nsr  --tele monitoring: started  --labs: HA1C, lipids, TSH pending  --BP: allow permissive to 220/110  --Treatment: asa in ER.  Continue plavix, increase asa to 325 mg a day for time being.  #. DVT Prophylaxis  --SCDs  #. PT/OT/Speech  --Hold evaluations- likely start tomorrow  #. Nutrition / GI Prophylaxis  --Per recommendations of speech therapy      I discussed the above diagnosis, assessment, and further testing with the patient and his partner, Halina Chaney MD    Code Status    Prior        Reason for Consult   Reason for consult: I was asked by Dr. Olvier to evaluate this patient for confusion and left neglect.      Chief Complaint   confusion    History is obtained from the patient, his partner Halina and the electronic medical chart.    History of Present Illness   Dustin Pearce is a 89 year old male with history of strokes and intracranial stenosis, taking aspirin and Plavix at home,  who presents with confusion and left-sided neglect.  He was last seen normal last night by his partner Halina.  This morning at 5:30 in the morning he got up.  His partner thought that he was confused but no obvious neurological deficits at that time.  Then at 840 he woke up again and seemed even more confused.  He had trouble understanding to go to the bathroom.  He then fell probably to his knees, and at that time Halina called 911.  He has been speaking normally but does seem confused per patient's significant other.    Past Medical History   I have reviewed this patient's medical history and updated it with pertinent information if needed.   Past Medical History:   Diagnosis Date     Aortic root dilatation (H)      Aortic stenosis      Benign essential hypertension 1/6/2017     Benign non-nodular prostatic hyperplasia with lower urinary tract symptoms 10/20/2016     CAD (coronary artery disease)     MI 1970     Cardiomyopathy (H)      Carotid artery stenosis 10/20/2016    chronically occluded left internal carotid artery     Carotid occlusion, left      Coronary artery disease involving native coronary artery of native heart without angina pectoris 10/20/2016     Diabetes mellitus (H)      DJD (degenerative joint disease)      Gastro-oesophageal reflux disease      Gastroesophageal reflux disease without esophagitis 10/20/2016     Heart attack      Hyperlipidemia      Hypertension      Mild cognitive impairment 10/20/2016     Nephrolithiasis     RECURRENT     Osteopenia      PAD (peripheral artery disease) (H)     peripheral angiogram 5/2017: The common iliac artery stenoses were stented and the superficial femoral artery stenoses were angioplastied     Paroxysmal atrial fibrillation (H)      PONV (postoperative nausea and vomiting)      RBBB with left anterior fascicular block      Unspecified cerebral artery occlusion with cerebral infarction        Past Surgical History   I have reviewed this patient's surgical  history and updated it with pertinent information if needed.[KK1.1]  Past Surgical History:   Procedure Laterality Date     AMPUTATE FOOT Left 6/4/2017    Procedure: AMPUTATE FOOT;  Sun Add#3: partial left foot amputation - Sagital Saw - Mini C-Arm;  Surgeon: Moe Kirk DPM;  Location:  OR     CHOLECYSTECTOMY       ESOPHAGOSCOPY, GASTROSCOPY, DUODENOSCOPY (EGD), COMBINED N/A 12/26/2016    Procedure: COMBINED ESOPHAGOSCOPY, GASTROSCOPY, DUODENOSCOPY (EGD);  Surgeon: Nasim Louis MD;  Location:  GI     GENITOURINARY SURGERY      KIDNEY STONE REMOVAL X 4     HC UGI ENDOSCOPY W EUS N/A 12/29/2016    Procedure: COMBINED ENDOSCOPIC ULTRASOUND, ESOPHAGOSCOPY, GASTROSCOPY, DUODENOSCOPY (EGD);  Surgeon: Nasim Louis MD;  Location:  GI     HERNIA REPAIR       INCISION AND DRAINAGE FOOT, COMBINED Left 6/6/2017    Procedure: COMBINED INCISION AND DRAINAGE FOOT;  REVISIONAL LEFT FOOT INCISION AND DRAINAGE WITH POSSIBLE FLAP CLOSURE , ANTIBIOTIC SOLUTION ;  Surgeon: Nabil Ann DPM;  Location:  OR     LASER HOLMIUM LITHOTRIPSY URETER(S), INSERT STENT, COMBINED  3/2/2012    Procedure:COMBINED CYSTOSCOPY, URETEROSCOPY, LASER HOLMIUM LITHOTRIPSY URETER(S), INSERT STENT; CYSTOSCOPY, RIGHT RETROGRADES, RIGHT URETEROSCOPY, HOLMIUM LASER, RIGHT STENT PLACEMENT; Surgeon:ANJELICA LEÓN; Location: OR     ROTATOR CUFF REPAIR RT/LT[KK1.3]         Prior to Admission Medications   Prior to Admission Medications   Prescriptions Last Dose Informant Patient Reported? Taking?   AMITRIPTYLINE HCL PO  Spouse/Significant Other Yes No   Sig: Take 25 mg by mouth nightly as needed for sleep   ATORVASTATIN CALCIUM PO  Spouse/Significant Other Yes No   Sig: Take 80 mg by mouth daily   Clopidogrel Bisulfate, [id number], (PLAVIX PO)  Spouse/Significant Other Yes No   Sig: Take 75 mg by mouth daily    DULoxetine (CYMBALTA) 30 MG EC capsule  Spouse/Significant Other No No   Sig: Take 1 capsule (30 mg) by  "mouth daily   LISINOPRIL PO   Yes No   Sig: Take 2.5 mg % by mouth daily   METFORMIN HCL PO  Spouse/Significant Other Yes No   Sig: Take 1,000 mg by mouth 2 times daily (with meals)   Psyllium (METAMUCIL PO)  Spouse/Significant Other Yes No   Sig: Take 1 packet by mouth daily as needed    TRAMADOL HCL PO   Yes No   Sig: Take 25 mg by mouth every 6 hours as needed for moderate to severe pain   aspirin EC 81 MG EC tablet  Spouse/Significant Other No No   Sig: Take 1 tablet (81 mg) by mouth daily   blood glucose monitoring (NO BRAND SPECIFIED) test strip  Spouse/Significant Other No No   Sig: Use to test blood sugar three times daily or as directed.   glipiZIDE (GLUCOTROL) 10 MG tablet   Yes No   Sig: Take 1 tablet (10 mg) by mouth 2 times daily (before meals)   ipratropium (ATROVENT) 0.03 % spray  Spouse/Significant Other Yes No   Sig: Spray 2 sprays into both nostrils every 12 hours   metoprolol (TOPROL-XL) 25 MG 24 hr tablet   No No   Sig: Take 0.5 tablets (12.5 mg) by mouth daily   omeprazole (PRILOSEC) 40 MG capsule  Spouse/Significant Other No No   Sig: Take 1 capsule (40 mg) by mouth daily Take 30-60 minutes before a meal.   order for DME  Spouse/Significant Other No No   Sig: Equipment being ordered: True [employer] Blood Glucose meter.   tamsulosin (FLOMAX) 0.4 MG capsule  Spouse/Significant Other No No   Sig: Take 1 capsule (0.4 mg) by mouth daily      Facility-Administered Medications: None     Allergies   Allergies   Allergen Reactions     Oxycodone Other (See Comments)     \"TERRIBLE SWEATING\"     Sulfa Drugs      Tetracycline        Social History   I have reviewed this patient's social history and updated it with pertinent information if needed. Dustin Pearce[KK1.1]  reports that he quit smoking about 19 years ago. His smoking use included Cigarettes. He has a 30.00 pack-year smoking history. He has never used smokeless tobacco. He reports that he drinks about 4.2 oz of alcohol per week  He reports that he " does not use illicit drugs.[KK1.3]    Family History   I have reviewed this patient's family history and updated it with pertinent information if needed.[KK1.1]   Family History   Problem Relation Age of Onset     Breast Cancer Mother      Heart Failure Father 81[KK1.3]       Review of Systems   To the best of my ability the 10 point Review of Systems is negative other than noted in the HPI.    Physical Exam   Temp: 97.7  F (36.5  C) Temp src: Oral BP: (!) 144/104 Pulse: 76 Heart Rate: 76 Resp: 16 SpO2: 97 % O2 Device: None (Room air)    Vital Signs with Ranges  Temp:  [97.7  F (36.5  C)] 97.7  F (36.5  C)  Pulse:  [76] 76  Heart Rate:  [76] 76  Resp:  [16] 16  BP: (144)/(104) 144/104  SpO2:  [97 %] 97 %  0 lbs 0 oz    General Appearance:  No acute distress  Neuro:       Mental Status Exam: Awake alert, able to answer most questions correctly.  Seems agitated and confused.  Able to show thumbs up on either side.  However shows extinction on the left and neglect of the left side.       Cranial Nerves: Visual field cut left upper and lower- moderate. extraocular movements are intact but requires specifically telling him to look to the left side.  Very mild left lower facial droop.  Patient reports normal left facial sensation but I am not clear if that is correct.  Otherwise cranial nerves II through XII are intact.       Motor: 5 out of 5 bilateral upper extremities and bilateral lower extremities.  Extinction and mild neglect on the left.           Reflexes: 0 throughout, no clonus at the ankles       Sensory: Normal light touch on the right.  Appears to have absent light touch on the left upper extremity and left lower extremity.  Definitely has extinction of the left side.       Coordination: Finger-nose-finger normal bilaterally       Gait: Deferred fall risk  Neck: no nuchal rigidity. No carotid bruits.    Extremities: No clubbing, no cyanosis, no edema  CV: RRR nl s1, s2  Resp: CTAB    National Institutes of  Health Stroke Scale  Exam Interval: Baseline   Score    Level of consciousness: (0)   Alert, keenly responsive    LOC questions: (0)   Answers both questions correctly    LOC commands: (0)   Performs both tasks correctly    Best gaze: (0)   Normal    Visual: (2)   Complete hemianopia    Facial palsy: (1)   Minor paralysis (flat nasolabial fold, smile asymmetry)    Motor arm (left): (0)   No drift    Motor arm (right): (0)   No drift    Motor leg (left): (0)   No drift    Motor leg (right): (0)   No drift    Limb ataxia: (0)   Absent    Sensory: (1)   Mild to moderate sensory loss    Best language: (0)   Normal- no aphasia    Dysarthria: (0)   Normal    Extinction and inattention: (1)   Visual, tacile, auditory, spatial, person inattention        Total Score:  5           Data   Results for orders placed or performed during the hospital encounter of 12/31/17 (from the past 24 hour(s))   Basic metabolic panel   Result Value Ref Range    Sodium 137 133 - 144 mmol/L    Potassium 5.4 (H) 3.4 - 5.3 mmol/L    Chloride 102 94 - 109 mmol/L    Carbon Dioxide 28 20 - 32 mmol/L    Anion Gap 7 3 - 14 mmol/L    Glucose 222 (H) 70 - 99 mg/dL    Urea Nitrogen 19 7 - 30 mg/dL    Creatinine 0.73 0.66 - 1.25 mg/dL    GFR Estimate >90 >60 mL/min/1.7m2    GFR Estimate If Black >90 >60 mL/min/1.7m2    Calcium 9.5 8.5 - 10.1 mg/dL   CBC with platelets differential   Result Value Ref Range    WBC 9.6 4.0 - 11.0 10e9/L    RBC Count 4.43 4.4 - 5.9 10e12/L    Hemoglobin 13.7 13.3 - 17.7 g/dL    Hematocrit 41.8 40.0 - 53.0 %    MCV 94 78 - 100 fl    MCH 30.9 26.5 - 33.0 pg    MCHC 32.8 31.5 - 36.5 g/dL    RDW 13.0 10.0 - 15.0 %    Platelet Count 160 150 - 450 10e9/L    Diff Method Automated Method     % Neutrophils 64.3 %    % Lymphocytes 25.5 %    % Monocytes 9.2 %    % Eosinophils 0.5 %    % Basophils 0.2 %    % Immature Granulocytes 0.3 %    Nucleated RBCs 0 0 /100    Absolute Neutrophil 6.1 1.6 - 8.3 10e9/L    Absolute Lymphocytes 2.4 0.8  - 5.3 10e9/L    Absolute Monocytes 0.9 0.0 - 1.3 10e9/L    Absolute Eosinophils 0.1 0.0 - 0.7 10e9/L    Absolute Basophils 0.0 0.0 - 0.2 10e9/L    Abs Immature Granulocytes 0.0 0 - 0.4 10e9/L    Absolute Nucleated RBC 0.0    INR   Result Value Ref Range    INR 0.99 0.86 - 1.14   Partial thromboplastin time   Result Value Ref Range    PTT 32 22 - 37 sec   CT Head w/o Contrast    Narrative    CT OF THE HEAD WITHOUT CONTRAST 12/31/2017 11:39 AM     COMPARISON: Brain MR 1/20/2017.    HISTORY: Code Stroke; left-sided weakness.    TECHNIQUE: 5 mm thick axial CT images of the head were acquired  without IV contrast material.    FINDINGS: A moderate-sized chronic left frontal infarct is again noted  without change. There is moderate diffuse cerebral volume loss. There  are subtle patchy areas of decreased density in the cerebral white  matter bilaterally that are consistent with sequela of chronic small  vessel ischemic disease.    The ventricles and basal cisterns are within normal limits in  configuration given the degree of cerebral volume loss.  There is no  midline shift. There are no extra-axial fluid collections.     No intracranial hemorrhage, mass or recent infarct.    The visualized paranasal sinuses are well-aerated. There is no  mastoiditis. There are no fractures of the visualized bones.       Impression    IMPRESSION: Moderate-sized chronic left frontal infarct again noted  without change. Diffuse cerebral volume loss and cerebral white matter  changes consistent with chronic small vessel ischemic disease. No  evidence for acute intracranial pathology.       Radiation dose for this scan was reduced using automated exposure  control, adjustment of the mA and/or kV according to patient size, or  iterative reconstruction technique.   CTA Angiogram Head Neck    Narrative    CT ANGIOGRAM OF THE HEAD AND NECK WITHOUT AND WITH CONTRAST   12/31/2017 11:52 AM     COMPARISON: None.    HISTORY: Code Stroke. Left-sided  weakness.    TECHNIQUE:  Precontrast localizing scans were followed by CT  angiography with an injection of 70 mL Isovue-370 nonionic intravenous  contrast material with scans through the head and neck.  Images were  transferred to a separate 3-D workstation where multiplanar  reformations and 3-D images were created.  Estimates of carotid  stenoses are made relative to the distal internal carotid artery  diameters except as noted.      FINDINGS:   Neck CTA: The common carotid arteries bilaterally remain patent  without stenosis. Chronic occlusion of the left internal carotid  artery beginning at the origin again noted. Atherosclerotic plaque  resulting in less than 50% luminal diameter stenosis of the origin of  the right internal carotid artery again noted. The cervical internal  carotid artery is tortuous but is otherwise patent with no additional  areas of narrowing. There is mild atherosclerotic narrowing at the  origin of the left vertebral artery. The right vertebral artery is  patent without stenosis.    Head CTA: There is calcified atherosclerotic plaque of the  intracranial distal internal carotid artery on the right that does not  result in stenosis. The left internal carotid artery is occluded.  There is moderate atherosclerotic narrowing at the distal P2 segment  of the left posterior cerebral artery that is unchanged. The basilar  artery is patent without stenosis. The bilateral anterior cerebral,  bilateral middle cerebral and right posterior cerebral arteries are  patent without stenosis. The anterior communicating and bilateral  posterior communicating arteries remain patent.      Impression    IMPRESSION:  1. 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  2. Moderate atherosclerotic narrowing of the distal P2 segment of the  left posterior cerebral artery again noted without change.  3. Atherosclerotic plaque at the origin of the  right internal carotid  artery resulting in less than 50% luminal diameter stenosis again  noted.  4. No evidence for intracranial cerebral artery occlusion to explain  the patient's recent symptoms.  5. Overall, no change from the comparison study.      Radiation dose for this scan was reduced using automated exposure  control, adjustment of the mA and/or kV according to patient size, or  iterative reconstruction technique.   CT Head w Contrast    Narrative    CT BRAIN PERFUSION 12/31/2017 11:56 AM    COMPARISON: None.    HISTORY:  Code Stroke; left-sided weakness.    TECHNIQUE: Time sequential axial CT images of the head were acquired  during the administration of intravenous contrast (50 mL Isovue-370).  CTA images of the [place] as well as color perfusion maps of  the brain were created from this time sequential axial source data.      Impression    IMPRESSION: There is a large chronic infarct at the anterolateral  aspect of the left frontal lobe. There are no other perfusion defects.    Radiation dose for this scan was reduced using automated exposure  control, adjustment of the mA and/or kV according to patient size, or  iterative reconstruction technique.           Imaging and procedures:  I personally reviewed these images.  CTH normal  CTA 1. 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  2. Moderate atherosclerotic narrowing of the distal P2 segment of the  left posterior cerebral artery again noted without change.  3. Atherosclerotic plaque at the origin of the right internal carotid  artery resulting in less than 50% luminal diameter stenosis again  noted.  4. No evidence for intracranial cerebral artery occlusion to explain  the patient's recent symptoms.  5. Overall, no change from the comparison study.  CTP no obvious asymmetry but R occipital region a little lower on ttp[KK1.1]         Revision History        User Key  "Date/Time User Provider Type Action    > KK1.2 12/31/2017 12:29 PM Dee Chaney MD Physician Sign     KK1.3 12/31/2017 12:25 PM Dee Chaney MD Physician      KK1.1 12/31/2017 12:19 PM Dee Chaney MD Physician                      Progress Notes - Physician (Notes for yesterday and today)      Progress Notes by Roland Rodriguez MD at 1/4/2018  7:18 AM     Author:  Roland Rodriguez MD Service:  Surgery Author Type:  Physician    Filed:  1/4/2018  8:14 AM Date of Service:  1/4/2018  7:18 AM Creation Time:  1/4/2018  7:18 AM    Status:  Addendum :  Roland Rodriguez MD (Physician)         Vascular Surgery Progress Note    S: Doing well this morning. Has pain in his neck/throat, but does not think he has needed pain medication. Voice feels hoarse this morning. No difficulty swallowing.     O:[NL1.1]   /62  Pulse 66  Temp 98.2  F (36.8  C) (Oral)  Resp (!) 34  Ht 1.727 m (5' 8\")  Wt 74.4 kg (164 lb 0.4 oz)  SpO2 94%  BMI 24.94 kg/m2[NL1.2]  General: Sitting in bed, in no distress  Neck: Incision intact, surrounding ecchymosis, soft  Drain: Serosanguinous output, 15 cc last shift  Resp: Non labored respirations  CV: Regular rate  Neuro: Alert and oriented, CN intact, strength equal and symmetric  Extremities: Warm, well perfused    A/P:   89 yo M POD#1 s/p right sided carotid endarterectomy, doing well post operatively  PRN pain control  Likely D/C drain today  Appreciate management by hospitialist team      Christie Bowman, PGY-4  [phone][NL1.1]    STAFF:  No complaints   Very comfortable.  Wife is here.                 CN XII and Neuro=A     ISREAL= 25 ml since placed----pulled                 Hx PAD.  Strong biphasic Doppler to left distal AT.                                  Weak Monophasic to right DP/PT                    Doing well.  With PAD/CAD/Cerebral disease I feel chronic Plavix is indicated                                       " Had GI bleed over 5 years ago and no problems since.                  Home per Hospitalists.  RTO 2 weeks.  Dulpex in three months of Carotid.[WO1.1]       Roland Rodriguez MD[WO1.2]      Revision History        User Key Date/Time User Provider Type Action    > WO1.2 1/4/2018  8:14 AM Roland Rodriguez MD Physician Addend     WO1.1 1/4/2018  8:11 AM Roland Rodriguez MD Physician      NL1.2 1/4/2018  7:22 AM Christie Bowman MD Resident Sign     NL1.1 1/4/2018  7:18 AM Christie Bowman MD Resident             Progress Notes by Meseret Tidwell MD at 1/3/2018 11:02 AM     Author:  Meseret Tidwell MD Service:  Hospitalist Author Type:  Physician    Filed:  1/3/2018  6:32 PM Date of Service:  1/3/2018 11:02 AM Creation Time:  1/3/2018 11:02 AM    Status:  Addendum :  Meseret Tidwell MD (Physician)         [facility]    Hospitalist Progress Note    Assessment & Plan   Dustin Pearce is a 89 year old male who was admitted on 12/31/2017.        Dustin Pearce is an 89-year-old male with a history of coronary artery disease, hypertension, type 2 diabetes, dyslipidemia, macular degeneration, history of stroke, hypertension and chronically occluded left carotid artery who presents with changes of slurred speech and confusion.  He was last normal last night before bed.  He is outside the window for TPA.       1.  TIA  12/31/17[EL1.1]-[EL1.2]CT of his head with and without contrast as well as a CTA.  This did not show any acute change.   -MRI no evidence of acute intracranial pathology, no change from prior study  CT and MRI showed a large chronic infarct anterolateral aspect of left frontal lobe  -much improved t[EL1.1]he following day[EL1.2]  and is very jolly, which is back to his usual personality  -[EL1.1]plan on TCU Thurs or Friday[EL1.2]  -continue the asa and plavix  Vascular surgery is going[EL1.1] to do a carotid enarterectomy today, as the right carotid artery  stenosis is felt to be symptomatic  -the left carotid artery has history of being completely occluded.    [EL1.2]  2.  Hypertension.  [EL1.1]On admission[EL1.2]  permissive hypertension per the Neurology stroke orders.[EL1.1]   -today as he is going to have surgery, will hold his BP meds   -BPs have been under control without his BP meds[EL1.2]  -can restart his usual BP meds on discharge[EL1.1] except would hold his ace as he had hyperkalemia on admit and also earlier at clinic visit.  His potassium has been normal since his ace inhibitor was held[EL1.2]      3.  Persistent hyperkalemia.  The patient had a potassium of 5.9 on 12/01/2017.  It now is 5.4.   -his creatinine has normalized off the ACE  -recommend that he not be on his ace or arb when he is discharged     4.  History of type 2 diabetes under poor control with an A1c of 8.9.  The patient will be on a medium sliding scale[EL1.1],[EL1.2]  start[EL1.1]ed[EL1.2] him on 8 units of Lantus a[EL1.1]nd[EL1.2] hold[EL1.1]ing[EL1.2]  his metformin and glipizide.[EL1.1]   -pt is still on his byetta[EL1.3]  -[EL1.1]glucoses 198, yesterday 209, 268, 270  -lantus inc to 12 units and an extra 3 units today[EL1.2]      5.  History of hyperlipidemia. restart[EL1.1]ed[EL1.2] his statin  6.  History of chronic left carotid artery stenosis.   7.  History of mild cognitive impairment.   8.  History of paroxysmal atrial fibrillation.  The patient is not on anticoagulation.   9.  Code status was discussed with the patient's wife, and he is full code.[EL1.1]  10. History of macular degeneration of the left eye, cannot see out of the left eye.  Also has macular degeneration of the left eye, not as severe, and for this he gets intraocular injections.[EL1.2]      Deep venous thrombosis prophylaxis.  The patient will be on SCDs.   Code Status: Full Code    Disposition: Expected discharge[EL1.1] to TCU Thurs or Fri[EL1.2]    Meseret Tidwell MD    Interval History[EL1.1]   Feels  fine, no complaints, to surgery later today as above[EL1.2]    -Data reviewed today: I reviewed all new labs and imaging results over the last 24 hours. I personally reviewed[EL1.1] no images or EKG's today[EL1.2].    Physical Exam   Temp: 97.6  F (36.4  C) Temp src: Oral BP: 139/67 Pulse: 66 Heart Rate: 70 Resp: 16 SpO2: 95 % O2 Device: None (Room air)    Vitals:    12/31/17 1333 01/01/18 0616 01/02/18 0458   Weight: 74.7 kg (164 lb 11.2 oz) 73.5 kg (162 lb) 73.7 kg (162 lb 8 oz)     Vital Signs with Ranges  Temp:  [97.6  F (36.4  C)-98.3  F (36.8  C)] 97.6  F (36.4  C)  Pulse:  [66-81] 66  Heart Rate:  [67-70] 70  Resp:  [14-16] 16  BP: ()/(44-67) 139/67  SpO2:  [94 %-97 %] 95 %  I/O last 3 completed shifts:  In: 456 [P.O.:450; I.V.:6]  Out: -     Constitutional: alert   Respiratory: clear to auscultation, no wheezing or rhonchi   Cardiovascular: regular rate and rhythm without murmer or rub   GI:positive bowel sounds, non-tender   Skin/Integumen: no rashes    Other:        Medications     - MEDICATION INSTRUCTIONS -       - MEDICATION INSTRUCTIONS -         omeprazole  40 mg Oral QAM AC     insulin glargine  8 Units Subcutaneous Daily     sodium chloride (PF)  10 mL Intravenous Once     ipratropium  2 spray Both Nostrils Q12H     sodium chloride (PF)  3 mL Intracatheter Q8H     aspirin EC  325 mg Oral Daily    Or     aspirin  300 mg Rectal Daily     insulin aspart  1-7 Units Subcutaneous TID AC     insulin aspart  1-5 Units Subcutaneous At Bedtime     atorvastatin (LIPITOR) tablet 80 mg  80 mg Oral Daily     DULoxetine  30 mg Oral Daily     metoprolol  12.5 mg Oral Daily     tamsulosin  0.4 mg Oral Daily       Data     Recent Labs  Lab 01/01/18  0815 12/31/17  2148 12/31/17  1425 12/31/17  1130   WBC 5.3  --   --  9.6   HGB 12.1*  --   --  13.7   MCV 93  --   --  94   *  --   --  160   INR  --   --   --  0.99     --   --  137   POTASSIUM 4.3  --  4.4 5.4*   CHLORIDE 105  --   --  102   CO2 26   --   --  28   BUN 13  --   --  19   CR 0.66  --   --  0.73   ANIONGAP 6  --   --  7   ALLISON 8.2*  --   --  9.5   *  --  193* 222*   TROPI  --  0.033 0.024  --        No results found for this or any previous visit (from the past 24 hour(s)).[EL1.1]     Revision History        User Key Date/Time User Provider Type Action    > [N/A] 1/3/2018  6:32 PM Meseret Tidwell MD Physician Addend     EL1.3 1/3/2018  6:30 PM Meseret Tidwell MD Physician Sign     EL1.2 1/3/2018  6:21 PM Meseret Tidwell MD Physician      EL1.1 1/3/2018 11:02 AM Meseret Tidwell MD Physician             Progress Notes by Roland Rodriguez MD at 1/3/2018 11:37 AM     Author:  Roland Rodriguez MD Service:  Vascular Surgery Author Type:  Physician    Filed:  1/3/2018 11:38 AM Date of Service:  1/3/2018 11:37 AM Creation Time:  1/3/2018 11:37 AM    Status:  Signed :  Roland Rodriguez MD (Physician)         VASCULAR SURGERY    Pt has decided to proceed with Right CEA later today.  Discussed again with pt and his wife.[WO1.1]      Roland Rodriguez MD[WO1.2]      Revision History        User Key Date/Time User Provider Type Action    > WO1.2 1/3/2018 11:38 AM Roland Rodriguez MD Physician Sign     WO1.1 1/3/2018 11:37 AM Roland Rodriguez MD Physician             Progress Notes by Elsa Bryant MD at 1/3/2018  9:53 AM     Author:  Elsa Bryant MD Service:  Vascular Surgery Author Type:  Fellow    Filed:  1/3/2018 10:29 AM Date of Service:  1/3/2018  9:53 AM Creation Time:  1/3/2018  9:53 AM    Status:  Signed :  Elsa Bryant MD (Fellow)         Vascular Surgery Progress Note         Assessment and Plan:    Assessment:[YS1.1]   Mr. Dustin Pearce is an 89-year old man with symptomatic right carotid stenosis/[YS1.2]      Plan:[YS1.1]   - To OR today for right carotid endareterctomy.  - The risks and benefit of the surgery were explained to the patient.  - Continue aspirin.[YS1.2]            Interval History:[YS1.1]   Vital signs are stable. No acute event overnight.[YS1.2]          Medications:[YS1.1]     Current Facility-Administered Medications   Medication     omeprazole (priLOSEC) CR capsule 40 mg     insulin glargine (LANTUS) injection 8 Units     sodium chloride (PF) 0.9% PF flush 10 mL     ipratropium (ATROVENT) 0.03 % spray 2 spray     lidocaine 1 % 1 mL     lidocaine (LMX4) cream     sodium chloride (PF) 0.9% PF flush 3 mL     sodium chloride (PF) 0.9% PF flush 3 mL     Medication Instruction     naloxone (NARCAN) injection 0.1-0.4 mg     ondansetron (ZOFRAN-ODT) ODT tab 4 mg    Or     ondansetron (ZOFRAN) injection 4 mg     prochlorperazine (COMPAZINE) injection 5 mg    Or     prochlorperazine (COMPAZINE) tablet 5 mg    Or     prochlorperazine (COMPAZINE) Suppository 12.5 mg     metoclopramide (REGLAN) tablet 5 mg    Or     metoclopramide (REGLAN) injection 5 mg     Medication Instruction     labetalol (NORMODYNE/TRANDATE) injection 10-40 mg     aspirin EC EC tablet 325 mg    Or     aspirin Suppository 300 mg     glucose 40 % gel 15-30 g    Or     dextrose 50 % injection 25-50 mL    Or     glucagon injection 1 mg     insulin aspart (NovoLOG) inj (RAPID ACTING)     insulin aspart (NovoLOG) inj (RAPID ACTING)     atorvastatin (LIPITOR) tablet 80 mg     DULoxetine (CYMBALTA) EC capsule 30 mg     metoprolol (TOPROL-XL) half-tab 12.5 mg     tamsulosin (FLOMAX) capsule 0.4 mg[YS1.3]             Physical Exam:[YS1.1]   Temp: 97.6  F (36.4  C) Temp src: Oral BP: 139/67 Pulse: 66 Heart Rate: 70 Resp: 16 SpO2: 95 % O2 Device: None (Room air)[YS1.3]      General: AAOx3  HEENT: NC, AT  Respiratory: not in distress  Abdomen: soft, non-tender  Ext: No cyanosis or edema[YS1.2]          Data:[YS1.1]     Lab Results   Component Value Date    WBC 5.3 01/01/2018    HGB 12.1 (L) 01/01/2018    HCT 36.5 (L) 01/01/2018     (L) 01/01/2018     01/01/2018    POTASSIUM 4.3 01/01/2018    CHLORIDE 105  01/01/2018    CO2 26 01/01/2018    BUN 13 01/01/2018    CR 0.66 01/01/2018     (H) 01/01/2018    SED 43 (H) 05/31/2017    TROPI 0.033 12/31/2017    AST 43 12/27/2016    ALT 50 12/27/2016    ALKPHOS 90 12/27/2016    BILITOTAL 0.7 12/27/2016    INR 0.99 12/31/2017[YS1.3]              Elsa López) MD Annette  Vascular Surgery Fellow  [phone] (p)[YS1.1]     Revision History        User Key Date/Time User Provider Type Action    > YS1.3 1/3/2018 10:29 AM Elsa Bryant MD Fellow Sign     YS1.2 1/3/2018 10:26 AM Elsa Bryant MD Fellow      YS1.1 1/3/2018  9:53 AM Elsa Bryant MD Fellow                   Procedure Notes     No notes of this type exist for this encounter.         Progress Notes - Therapies (Notes from 01/01/18 through 01/04/18)      Progress Notes by Marian Corrigan PT at 1/1/2018  4:35 PM     Author:  Marian Corrigan PT Service:  (none) Author Type:  Physical Therapist    Filed:  1/1/2018  4:35 PM Date of Service:  1/1/2018  4:35 PM Creation Time:  1/1/2018  4:35 PM    Status:  Signed :  Marian Corrigan PT (Physical Therapist)            01/01/18 1433   Quick Adds   Type of Visit Initial PT Evaluation   Living Environment   Lives With significant other  ([place])   Living Arrangements house  ([facility])   Number of Stairs to Enter Home 2  (1 railing. )   Number of Stairs Within Home 7  (1 railing)   Transportation Available family or friend will provide   Living Environment Comment All needs are met on the main level.    Self-Care   Usual Activity Tolerance good   Current Activity Tolerance moderate   Regular Exercise yes   Activity/Exercise Type strength training   Equipment Currently Used at Home cane, straight;walker, rolling   Functional Level Prior   Ambulation 1-->assistive equipment   Transferring 1-->assistive equipment   Fall history within last six months yes   Number of times patient has fallen within last six months 4   Prior Functional Level Comment Per pt's SO, patient  completes functional mobility with use of SEC in the house with occasional use of FWW.    General Information   Onset of Illness/Injury or Date of Surgery - Date 12/31/17   Referring Physician Meseret Tidwell MD   Patient/Family Goals Statement None stated.    Pertinent History of Current Problem (include personal factors and/or comorbidities that impact the POC) 88 y/o male admitted with confusion, currently undergoing CVA workup. PMH including L CVA (1999), L ICA occlusion, TIA, ischemia cardiomyopathy, hyperlipidemia, DM 2, HTN.    Precautions/Limitations fall precautions   General Observations Pt in supine upon arrival of therapist.    General Info Comments Ambulate with assist.    Cognitive Status Examination   Orientation orientation to person, place and time   Level of Consciousness alert   Follows Commands and Answers Questions 75% of the time;unable to follow multi-step instructions   Personal Safety and Judgment impaired   Pain Assessment   Patient Currently in Pain No   Integumentary/Edema   Integumentary/Edema Comments No deficits noted.    Posture    Posture Comments Noted forward head and shoulder posture upon standing at FWW.    Range of Motion (ROM)   ROM Comment B LEs WFL.    Strength   Strength Comments L LE grossly 4/5, R LE grossly 3+/5. Pt reported R LE weakness after CVA in 1999.    Bed Mobility   Bed Mobility Comments Supine-sit, SBA.    Transfer Skills   Transfer Comments Sit <> stand with FWW and CGA.    Gait   Gait Comments Pt amb 150' with FWW and Oni.    Balance   Balance Comments Noted good sitting balance and fair standing balance at FWW.    Sensory Examination   Sensory Perception Comments Pt denies numbness/tingling in B LEs and UEs.    General Therapy Interventions   Planned Therapy Interventions balance training;bed mobility training;gait training;neuromuscular re-education;ROM;strengthening;transfer training   Clinical Impression   Criteria for Skilled Therapeutic  "Intervention yes, treatment indicated   PT Diagnosis Difficulty with gait   Influenced by the following impairments Decreased activity tolerance, Generalized weakness, Impaired balance   Functional limitations due to impairments Limited functional mobility requiring AD and assist.    Clinical Presentation Stable/Uncomplicated   Clinical Presentation Rationale Based on PMH, current presentation, and social support   Clinical Decision Making (Complexity) Low complexity   Therapy Frequency` daily   Predicted Duration of Therapy Intervention (days/wks) 4 days   Anticipated Discharge Disposition Transitional Care Facility   Risk & Benefits of therapy have been explained Yes   Patient, Family & other staff in agreement with plan of care Yes   [facility] AM-PAC  \"6 Clicks\" V.2 Basic Mobility Inpatient Short Form   1. Turning from your back to your side while in a flat bed without using bedrails? 4 - None   2. Moving from lying on your back to sitting on the side of a flat bed without using bedrails? 3 - A Little   3. Moving to and from a bed to a chair (including a wheelchair)? 3 - A Little   4. Standing up from a chair using your arms (e.g., wheelchair, or bedside chair)? 3 - A Little   5. To walk in hospital room? 3 - A Little   6. Climbing 3-5 steps with a railing? 2 - A Lot   Basic Mobility Raw Score (Score out of 24.Lower scores equate to lower levels of function) 18   Total Evaluation Time   Total Evaluation Time (Minutes) 8[JH1.1]        Revision History        User Key Date/Time User Provider Type Action    > JH1.1 1/1/2018  4:35 PM Marian Corrigan, PT Physical Therapist Sign            Progress Notes by Linda Atkins OT at 1/1/2018  9:37 AM     Author:  Linda Atkins OT Service:  Acute IP Rehab Author Type:  Occupational Therapist    Filed:  1/1/2018  9:37 AM Date of Service:  1/1/2018  9:37 AM Creation Time:  1/1/2018  9:37 AM    Status:  Signed :  Linda Atkins OT (Occupational Therapist)          " 01/01/18 0800   Quick Adds   Type of Visit Initial Occupational Therapy Evaluation   Living Environment   Lives With significant other   Living Arrangements house   Home Accessibility stairs (2 railings present);stairs to enter home;stairs within home   Number of Stairs to Enter Home 2   Number of Stairs Within Home 14   Stair Railings at Home inside, present at both sides   Transportation Available family or friend will provide;car   Functional Level Prior   Ambulation 1-->assistive equipment   Transferring 1-->assistive equipment   Toileting 1-->assistive equipment   Bathing 0-->independent   Dressing 0-->independent   Eating 0-->independent   Communication 0-->understands/communicates without difficulty   Swallowing 0-->swallows foods/liquids without difficulty   Cognition 1 - attention or memory deficits   Fall history within last six months yes   Number of times patient has fallen within last six months 4   Which of the above functional risks had a recent onset or change? fall history   Prior Functional Level Comment Per SO report pt rarely uses AD for mobility in the home, and is able to complete ADLs IND'ly, pt's significant other works full time, neighbors check in on him during the day, SO manages meds and finances, and escorts on stairs due to vision loss.    General Information   Onset of Illness/Injury or Date of Surgery - Date 12/31/17   Referring Physician Meseret Tidwell MD   Patient/Family Goals Statement None specifically stated   Additional Occupational Profile Info/Pertinent History of Current Problem Pt is an 89 yo male with dementia admitted for confusion, undergoing workup for CVA. Pt has macular degeneration and reports no vision in L eye, pt has history of CVA and cardiac issues, toe amputation, please see chart for full H & P   Precautions/Limitations fall precautions;swallowing precautions   Cognitive Status Examination   Orientation person   Level of Consciousness alert;confused    Able to Follow Commands mild impairment   Personal Safety (Cognitive) impulsive;at risk behaviors demonstrated   Memory impaired   Organization/Problem Solving Problem solving impaired   Executive Function Impulsive   Cognitive Comment Pt shows decreased short term memory and decreased ability to follow direction.    Visual Perception   Visual Perception Wears glasses   Visual Perception Comments Pt has macular degeneration and does not have sight in L eye, pt is able to see light in L eye, pt reports his glasses are very helpful   Sensory Examination   Sensory Quick Adds No deficits were identified   Pain Assessment   Patient Currently in Pain No   Range of Motion (ROM)   ROM Comment BUE WFL for ADLs,    Strength   Strength Comments BUE WFL for ADLs   Hand Strength   Hand Strength Comments BUE WFL for ADLs   Coordination   Upper Extremity Coordination No deficits were identified   Coordination Comments WFL for ADLs   Mobility   Bed Mobility Comments SBA   Transfer Skill: Bed to Chair/Chair to Bed   Level of [place]: Bed to Chair minimum assist (75% patients effort)   Physical Assist/Nonphysical Assist: Bed to Chair verbal cues;1 person assist;nonverbal cues (demo/gestures)   Transfer Skill: Sit to Stand   Level of [place]: Sit/Stand contact guard   Physical Assist/Nonphysical Assist: Sit/Stand supervision;verbal cues;nonverbal cues (demo/gestures);1 person assist   Transfer Skill: Toilet Transfer   Level of [place]: Toilet minimum assist (75% patients effort)   Physical Assist/Nonphysical Assist: Toilet supervision;verbal cues;nonverbal cues (demo/gestures);1 person assist   Assistive Device grab bars;rolling walker   Balance   Balance Comments decreased static and dynamic balance   Bathing   Level of [place] minimum assist (75% patients effort)   Upper Body Dressing   Level of [place]: Dress Upper Body stand-by assist   Physical Assist/Nonphysical Assist: Dress Upper Body  "supervision;verbal cues   Lower Body Dressing   Level of [place]: Dress Lower Body minimum assist (75% patients effort)   Physical Assist/Nonphysical Assist: Dress Lower Body supervision;verbal cues;set-up required   Toileting   Level of [place]: Toilet contact guard   Physical Assist/Nonphysical Assist: Toilet supervision;verbal cues   Grooming   Level of [place]: Grooming stand-by assist   Physical Assist/Nonphysical Assist: Grooming supervision;verbal cues   Eating/Self Feeding   Level of [place]: Eating stand-by assist   Physical Assist/Nonphysical Assist: Eating supervision   Activities of Daily Living Analysis   Impairments Contributing to Impaired Activities of Daily Living balance impaired;cognition impaired  (vision impaired)   General Therapy Interventions   Planned Therapy Interventions ADL retraining;cognition   Clinical Impression   Criteria for Skilled Therapeutic Interventions Met yes, treatment indicated   OT Diagnosis Reduced IND in ADLs   Influenced by the following impairments vision, balance, cognition   Assessment of Occupational Performance 5 or more Performance Deficits   Identified Performance Deficits dressing, eating, G/H, transfers and ambulation, dressing, bathing   Clinical Decision Making (Complexity) Moderate complexity   Therapy Frequency daily   Predicted Duration of Therapy Intervention (days/wks) 3 days   Anticipated Discharge Disposition Home with Assist;Home with Home Therapy;Transitional Care Facility   Risks and Benefits of Treatment have been explained. Yes   Patient, Family & other staff in agreement with plan of care Yes   [facility] AM-PAC  \"6 Clicks\" Daily Activity Inpatient Short Form   1. Putting on and taking off regular lower body clothing? 3 - A Little   2. Bathing (including washing, rinsing, drying)? 3 - A Little   3. Toileting, which includes using toilet, bedpan or urinal? 3 - A Little   4. Putting on and taking off regular upper body " clothing? 3 - A Little   5. Taking care of personal grooming such as brushing teeth? 3 - A Little   6. Eating meals? 3 - A Little   Daily Activity Raw Score (Score out of 24.Lower scores equate to lower levels of function) 18   Total Evaluation Time   Total Evaluation Time (Minutes) 10[ES1.1]        Revision History        User Key Date/Time User Provider Type Action    > ES1.1 1/1/2018  9:37 AM Linda Atkins, OT Occupational Therapist Sign                                                      INTERAGENCY TRANSFER FORM - LAB / IMAGING / EKG / EMG RESULTS   12/31/2017                       [place] [address] [facility]: [phone]            Unresulted Labs     None         Lab Results - 3 Days      Potassium [[id number]]  Resulted: 01/04/18 1024, Result status: Final result    Ordering provider: Gagan Jerome MD  01/04/18 0814 Resulting lab: [facility]    Specimen Information    Type Source Collected On   Blood  01/04/18 0950          Components       Value Reference Range Flag Lab   Potassium 4.0 3.4 - 5.3 mmol/L  FrStLb            Creatinine [[id number]]  Resulted: 01/04/18 1024, Result status: Final result    Ordering provider: Gagan Jerome MD  01/04/18 0814 Resulting lab: [facility]    Specimen Information    Type Source Collected On   Blood  01/04/18 0950          Components       Value Reference Range Flag Lab   Creatinine 0.70 0.66 - 1.25 mg/dL  FrStHsLb   GFR Estimate >90 >60 mL/min/1.7m2  FrStHsLb   Comment:  Non  GFR Calc   GFR Estimate If Black >90 >60 mL/min/1.7m2  FrStHsLb   Comment:   GFR Calc            Glucose by meter [[id number]] (Abnormal)  Resulted: 01/04/18 1005, Result status: Final result    Ordering provider: Meseret Tidwell MD  01/03/18 2005 Resulting lab: PO[facility] OF CARE TEST, GLUCOSE    Specimen Information    Type Source Collected On     01/03/18 2005          Components       Value Reference Range  Flag Lab   Glucose 210 70 - 99 mg/dL H 170            Glucose by meter [[id number]] (Abnormal)  Resulted: 01/04/18 0951, Result status: Final result    Ordering provider: Meseret Tidwell MD  01/04/18 0948 Resulting lab: POINT OF CARE TEST, GLUCOSE    Specimen Information    Type Source Collected On     01/04/18 0948          Components       Value Reference Range Flag Lab   Glucose 203 70 - 99 mg/dL H 170            Glucose by meter [[id number]] (Abnormal)  Resulted: 01/04/18 0828, Result status: Final result    Ordering provider: Meseret Tidwell MD  01/04/18 0822 Resulting lab: POINT OF CARE TEST, GLUCOSE    Specimen Information    Type Source Collected On     01/04/18 0822          Components       Value Reference Range Flag Lab   Glucose 176 70 - 99 mg/dL H 170            CBC with platelets [[id number]] (Abnormal)  Resulted: 01/04/18 0701, Result status: Final result    Ordering provider: Roland Rodriguez MD  01/04/18 0541 Resulting lab: [facility]    Specimen Information    Type Source Collected On   Blood  01/04/18 0630          Components       Value Reference Range Flag Lab   WBC 6.9 4.0 - 11.0 10e9/L  FrStHsLb   RBC Count 3.31 4.4 - 5.9 10e12/L L FrStHsLb   Hemoglobin 10.1 13.3 - 17.7 g/dL L FrStHsLb   Hematocrit 31.3 40.0 - 53.0 % L FrStHsLb   MCV 95 78 - 100 fl  FrStHsLb   MCH 30.5 26.5 - 33.0 pg  FrStHsLb   MCHC 32.3 31.5 - 36.5 g/dL  FrStHsLb   RDW 13.1 10.0 - 15.0 %  FrStHsLb   Platelet Count 114 150 - 450 10e9/L L FrStHsLb            Glucose by meter [[id number]] (Abnormal)  Resulted: 01/04/18 0701, Result status: Final result    Ordering provider: Meseret Tidwell MD  01/03/18 1743 Resulting lab: POINT OF CARE TEST, GLUCOSE    Specimen Information    Type Source Collected On     01/03/18 1743          Components       Value Reference Range Flag Lab   Glucose 187 70 - 99 mg/dL H 170            Glucose by meter [[id number]] (Abnormal)  Resulted: 01/04/18 0655, Result  status: Final result    Ordering provider: Meseret Tidwell MD  01/04/18 0636 Resulting lab: POINT OF CARE TEST, GLUCOSE    Specimen Information    Type Source Collected On     01/04/18 0636          Components       Value Reference Range Flag Lab   Glucose 199 70 - 99 mg/dL H 170            Glucose by meter [[id number]] (Abnormal)  Resulted: 01/03/18 1446, Result status: Final result    Ordering provider: Meseret Tidwell MD  01/03/18 1442 Resulting lab: POINT OF CARE TEST, GLUCOSE    Specimen Information    Type Source Collected On     01/03/18 1442          Components       Value Reference Range Flag Lab   Glucose 181 70 - 99 mg/dL H 170            Glucose by meter [[id number]] (Abnormal)  Resulted: 01/03/18 1155, Result status: Final result    Ordering provider: Meseret Tidwell MD  01/03/18 1148 Resulting lab: POINT OF CARE TEST, GLUCOSE    Specimen Information    Type Source Collected On     01/03/18 1148          Components       Value Reference Range Flag Lab   Glucose 204 70 - 99 mg/dL H 170            Glucose by meter [[id number]] (Abnormal)  Resulted: 01/03/18 0846, Result status: Final result    Ordering provider: Meseret Tidwell MD  01/03/18 0743 Resulting lab: POINT OF CARE TEST, GLUCOSE    Specimen Information    Type Source Collected On     01/03/18 0743          Components       Value Reference Range Flag Lab   Glucose 198 70 - 99 mg/dL H 170            Glucose by meter [[id number]] (Abnormal)  Resulted: 01/03/18 0221, Result status: Final result    Ordering provider: Meseret Tidwell MD  01/03/18 0153 Resulting lab: POINT OF CARE TEST, GLUCOSE    Specimen Information    Type Source Collected On     01/03/18 0153          Components       Value Reference Range Flag Lab   Glucose 209 70 - 99 mg/dL H 170            Glucose by meter [[id number]] (Abnormal)  Resulted: 01/02/18 2146, Result status: Final result    Ordering provider: Meseret Tidwell MD  01/02/18 2138 Resulting  lab: POINT OF CARE TEST, GLUCOSE    Specimen Information    Type Source Collected On     01/02/18 2138          Components       Value Reference Range Flag Lab   Glucose 270 70 - 99 mg/dL H 170            Glucose by meter [[id number]] (Abnormal)  Resulted: 01/02/18 1806, Result status: Final result    Ordering provider: Meesret Tidwell MD  01/02/18 1801 Resulting lab: POINT OF CARE TEST, GLUCOSE    Specimen Information    Type Source Collected On     01/02/18 1801          Components       Value Reference Range Flag Lab   Glucose 268 70 - 99 mg/dL H 170            Glucose by meter [[id number]] (Abnormal)  Resulted: 01/02/18 1156, Result status: Final result    Ordering provider: Meseret Tidwell MD  01/02/18 1152 Resulting lab: POINT OF CARE TEST, GLUCOSE    Specimen Information    Type Source Collected On     01/02/18 1152          Components       Value Reference Range Flag Lab   Glucose 209 70 - 99 mg/dL H 170            Glucose by meter [[id number]] (Abnormal)  Resulted: 01/02/18 1156, Result status: Final result    Ordering provider: Meseret Tidwell MD  01/02/18 0809 Resulting lab: POINT OF CARE TEST, GLUCOSE    Specimen Information    Type Source Collected On     01/02/18 0809          Components       Value Reference Range Flag Lab   Glucose 176 70 - 99 mg/dL H 170            Vitamin D Deficiency [[id number]] (Abnormal)  Resulted: 01/02/18 1135, Result status: Final result    Ordering provider: Meseret Tidwell MD  12/31/17 1357 Resulting lab: [facility]    Specimen Information    Type Source Collected On   Blood  12/31/17 1425          Components       Value Reference Range Flag Lab   Vitamin D Deficiency screening 13 20 - 75 ug/L L 51   Comment:         Season, race, dietary intake, and treatment affect the concentration of   25-hydroxy-Vitamin D. Values may decrease during winter months and increase   during summer months. Values 20-29 ug/L may indicate  Vitamin D insufficiency   and values <20 ug/L may indicate Vitamin D deficiency.  Vitamin D determination is routinely performed by an immunoassay specific for   25 hydroxyvitamin D3.  If an individual is on vitamin D2 (ergocalciferol)   supplementation, please specify 25 OH vitamin D2 and D3 level determination by   LCMSMS test [id number].              Glucose by meter [[id number]] (Abnormal)  Resulted: 01/01/18 2311, Result status: Final result    Ordering provider: Meseret Tidwell MD  01/01/18 2305 Resulting lab: POINT OF CARE TEST, GLUCOSE    Specimen Information    Type Source Collected On     01/01/18 2305          Components       Value Reference Range Flag Lab   Glucose 218 70 - 99 mg/dL H 170            Glucose by meter [[id number]] (Abnormal)  Resulted: 01/01/18 1726, Result status: Final result    Ordering provider: Meseret Tidwell MD  01/01/18 1723 Resulting lab: POINT OF CARE TEST, GLUCOSE    Specimen Information    Type Source Collected On     01/01/18 1723          Components       Value Reference Range Flag Lab   Glucose 175 70 - 99 mg/dL H 170            Glucose by meter [[id number]] (Abnormal)  Resulted: 01/01/18 1306, Result status: Final result    Ordering provider: Meseret Tidwell MD  01/01/18 1258 Resulting lab: POINT OF CARE TEST, GLUCOSE    Specimen Information    Type Source Collected On     01/01/18 1258          Components       Value Reference Range Flag Lab   Glucose 168 70 - 99 mg/dL H 170            Glucose by meter [[id number]] (Abnormal)  Resulted: 01/01/18 0915, Result status: Final result    Ordering provider: Meseret Tidwell MD  01/01/18 0910 Resulting lab: POINT OF CARE TEST, GLUCOSE    Specimen Information    Type Source Collected On     01/01/18 0910          Components       Value Reference Range Flag Lab   Glucose 159 70 - 99 mg/dL H 170            Basic metabolic panel [[id number]] (Abnormal)  Resulted: 01/01/18 0848, Result status: Final result    Ordering  provider: Meseret Tidwell MD  01/01/18 0000 Resulting lab: [facility]    Specimen Information    Type Source Collected On   Blood  01/01/18 0815          Components       Value Reference Range Flag Lab   Sodium 137 133 - 144 mmol/L  FrStHsLb   Potassium 4.3 3.4 - 5.3 mmol/L  FrStHsLb   Chloride 105 94 - 109 mmol/L  FrStHsLb   Carbon Dioxide 26 20 - 32 mmol/L  FrStHsLb   Anion Gap 6 3 - 14 mmol/L  FrStHsLb   Glucose 156 70 - 99 mg/dL H FrStHsLb   Urea Nitrogen 13 7 - 30 mg/dL  FrStHsLb   Creatinine 0.66 0.66 - 1.25 mg/dL  FrStHsLb   GFR Estimate >90 >60 mL/min/1.7m2  FrStHsLb   Comment:  Non  GFR Calc   GFR Estimate If Black >90 >60 mL/min/1.7m2  FrStHsLb   Comment:  African American GFR Calc   Calcium 8.2 8.5 - 10.1 mg/dL L FrStHsLb            CBC with platelets [[id number]] (Abnormal)  Resulted: 01/01/18 0833, Result status: Final result    Ordering provider: Meseret Tidwell MD  01/01/18 0000 Resulting lab: [facility]    Specimen Information    Type Source Collected On   Blood  01/01/18 0815          Components       Value Reference Range Flag Lab   WBC 5.3 4.0 - 11.0 10e9/L  FrStHsLb   RBC Count 3.93 4.4 - 5.9 10e12/L L FrStHsLb   Hemoglobin 12.1 13.3 - 17.7 g/dL L FrStHsLb   Hematocrit 36.5 40.0 - 53.0 % L FrStHsLb   MCV 93 78 - 100 fl  FrStHsLb   MCH 30.8 26.5 - 33.0 pg  FrStHsLb   MCHC 33.2 31.5 - 36.5 g/dL  FrStHsLb   RDW 12.7 10.0 - 15.0 %  FrStHsLb   Platelet Count 126 150 - 450 10e9/L L FrStHsLb            Testing Performed By     Lab - Abbreviation Name Director Address Valid Date Range    14 - FrStHsLb [facility] Unknown [address] [place] [address] 05/08/15 1057 - Present    51 - Unknown [facility] Unknown [address] [facility] [address] 12/31/14 1010 - Present    170 - Unknown POINT OF CARE TEST, GLUCOSE Unknown Unknown 10/31/11 1114 - Present               Imaging Results - 3 Days       US Carotid Right [[id number]]  Resulted: 01/02/18 0928, Result status: Final result    Ordering provider: Roland Rodriguez MD  01/01/18 1517 Resulted by: Bonnie Farrar DO    Performed: 01/02/18 0748 - 01/02/18 0804 Resulting lab: RADIOLOGY RESULTS    Narrative:       RIGHT CAROTID ULTRASOUND   1/2/2018 8:04 AM     HISTORY:  ? Worsening right ICA stenosis;     COMPARISON: None.    RIGHT CAROTID FINDINGS:  Shadowing plaque in the common carotid,  carotid bulb and internal carotid artery  Right ICA PSV:  228  cm/sec.  Right ICA EDV:  51 cm/sec.  Right ICA/CCA PSV Ratio:  3.0    These indicate  50 - 69%  diameter stenosis of the right ICA.    Right Vertebral: Antegrade flow.   Right ECA: Antegrade flow.      Causes of Decreased Accuracy:   None.       Impression:       IMPRESSION:    50-69% diameter stenosis of the right ICA relative to the distal ICA  diameter.    BONNIE FARRAR DO      MR Brain w/o & w Contrast [[id number]]  Resulted: 01/01/18 1604, Result status: Final result    Ordering provider: Meseret Tidwell MD  12/31/17 1357 Resulted by: Andrew Kaminski MD    Performed: 01/01/18 0539 - 01/01/18 0613 Resulting lab: RADIOLOGY RESULTS    Narrative:       MRI OF THE BRAIN WITHOUT AND WITH CONTRAST January 1, 2018 6:13 AM     HISTORY: New onset confusion this morning, also slurred speech,  history of stroke.     TECHNIQUE: Axial diffusion-weighted with ADC map, axial T2-weighted  with fat saturation, axial T1-weighted, axial turboFLAIR and coronal  T1-weighted images of the brain were acquired without intravenous  contrast. Following intravenous administration of gadolinium (7 mL  Gadavist), axial T1-weighted images of the brain were acquired.     COMPARISON: Head CT 12/31/2017.    FINDINGS: There is moderate diffuse cerebral volume loss. There are  patchy periventricular areas of abnormal T2 signal hyperintensity in  the cerebral white matter bilaterally that are consistent with  sequela  of chronic small vessel ischemic disease. A moderate sized chronic  left frontal infarct is again noted without change.    The ventricles and basal cisterns are within normal limits in  configuration given the degree of cerebral volume loss. There is no  midline shift. There are no extra-axial fluid collections. There is no  evidence for stroke or acute intracranial hemorrhage. There is no  abnormal contrast enhancement in the brain or its coverings.     There is no sinusitis or mastoiditis.      Impression:       IMPRESSION: Diffuse cerebral volume loss and cerebral white matter  changes consistent with chronic small vessel ischemic disease.  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 No evidence for acute intracranial pathology. No change from  the comparison study.    LISANDRO MAGALLON MD      Testing Performed By     Lab - Abbreviation Name Director Address Valid Date Range    104 - Rad R[facility] RADIOLOGY RESULTS Unknown Unknown 05 1553 - Present               ECG/EMG Results      Echo Complete with Lumason [[id number]]  Resulted: 18 1000, Result status: Edited Result - FINAL    Ordering provider: Meseret Tidwell MD  17 1357 Resulted by: Neeraj Rashid MD    Performed: 18 1034 - 18 1034 Resulting lab: RADIOLOGY RESULTS    Narrative:       [id number]  [id number]  [id number]  [id number]^KOLBY^MESERET^A           [facility]  Echocardiography Laboratory  [address] [place] [address]        Name: SID AGUIAR  MRN: [id number]  : 1928  Study Date: 2018 10:00 AM  Age: 89 yrs  Gender: Male  Patient Location: [facility]  Reason For Study: Cardiovascular Incident  Ordering Physician: MESERET TIDWELL  Referring Physician: Matt Morrissey  Performed By: Tana Rey,     BSA: 1.9 m2  Height: 68 in  Weight: 162 lb  BP: 153/77  mmHg  _____________________________________________________________________________  __        Procedure  Complete Portable Echo Adult. Contrast Lumason.  _____________________________________________________________________________  __        Interpretation Summary     The left ventricle is normal in size. There is mild to moderate concentric  left ventricular hypertrophy. Left ventricular systolic function is moderately  reduced. The visual ejection fraction is estimated at 35-40%. Grade I or early  diastolic dysfunction. There is severe hypokinesis to akinesis of the entire  inferior and mid to basal inferolateral walls. There is no thrombus seen in  the left ventricle.  The right ventricle is normal size. The right ventricular systolic function is  normal.  Trace to mild mitral and tricuspid regurgitation.  There is moderate trileaflet aortic sclerosis. No aortic regurgitation is  present. Transaortic gradients are mildly increased consistent with aortic  sclerosis. The peak AoV pressure gradient is 15.2 mmHg. The mean AoV pressure  gradient is 7.9 mmHg.  Mild aortic root dilatation.  No pericardial effusion.  In comparison to the previous report dated 01/21/2017, the findings are  similar.  _____________________________________________________________________________  __        Left Ventricle  The left ventricle is normal in size. There is mild to moderate concentric  left ventricular hypertrophy. Left ventricular systolic function is moderately  reduced. The visual ejection fraction is estimated at 35-40%. Grade I or early  diastolic dysfunction. There is severe hypokinesis to akinesis of the entire  inferior and mid to basal inferolateral walls. There is no thrombus seen in  the left ventricle.     Right Ventricle  The right ventricle is normal size. The right ventricular systolic function is  normal.     Atria  The left atrium is mildly dilated. Right atrial size is normal. There is no  color Doppler  evidence of an atrial shunt.     Mitral Valve  The mitral valve leaflets are mildly thickened. There is moderate mitral  annular calcification. There is trace to mild mitral regurgitation.        Tricuspid Valve  There is trace tricuspid regurgitation.     Aortic Valve  There is moderate trileaflet aortic sclerosis. No aortic regurgitation is  present. Transaortic gradients are mildly increased consistent with aortic  sclerosis. The peak AoV pressure gradient is 15.2 mmHg. The mean AoV pressure  gradient is 7.9 mmHg.     Pulmonic Valve  There is no pulmonic valvular stenosis.     Vessels  Mild aortic root dilatation. Normal size ascending aorta. The IVC is normal in  size and reactivity with respiration, suggesting normal central venous  pressure.     Pericardium  There is no pericardial effusion.        Rhythm  Sinus rhythm was noted.  _____________________________________________________________________________  __  MMode/2D Measurements & Calculations     IVSd: 1.4 cm  LVIDd: 4.4 cm  LVIDs: 3.9 cm  LVPWd: 1.3 cm  FS: 12.5 %  EDV(Teich): 89.5 ml  ESV(Teich): 65.1 ml  LV mass(C)d: 224.5 grams  LV mass(C)dI: 120.1 grams/m2  Ao root diam: 4.4 cm  LA dimension: 3.8 cm  asc Aorta Diam: 3.1 cm  LA/Ao: 0.87  LVOT diam: 2.4 cm  LVOT area: 4.7 cm2  RWT: 0.57        Doppler Measurements & Calculations  MV E max darshan: 50.9 cm/sec  MV A max darshan: 132.9 cm/sec  MV E/A: 0.38  MV dec time: 0.36 sec  Ao V2 max: 194.7 cm/sec  Ao max PG: 15.2 mmHg  Ao V2 mean: 133.7 cm/sec  Ao mean P.9 mmHg  Ao V2 VTI: 40.0 cm  YULIYA(I,D): 3.5 cm2  YULIYA(V,D): 3.7 cm2  LV V1 max P.6 mmHg  LV V1 max: 154.9 cm/sec  LV V1 VTI: 29.8 cm  SV(LVOT): 139.4 ml  SI(LVOT): 74.6 ml/m2  PA acc time: 0.08 sec     YULIYA Index (cm2/m2): 1.9  E/E' av.7  Lateral E/e': 12.1  Medial E/e': 17.4           _____________________________________________________________________________  __           Report approved by: Dulce Maria Queen 2018 01:57 PM       1     Type Source Collected On     01/01/18 1000          View Image (below)        Echocardiogram Complete [[id number]]  Resulted: 01/01/18 1034, Result status: In process    Ordering provider: Meseret Tidwell MD  12/31/17 1357 Performed: 01/01/18 1034 - 01/01/18 1034    Resulting lab: [facility]                   Encounter-Level Documents:     There are no encounter-level documents.      Order-Level Documents:     There are no order-level documents.

## 2017-12-31 NOTE — IP AVS SNAPSHOT
MRN:[id number]                      After Visit Summary   12/31/2017    Dustin Pearce    MRN: [id number]           Thank you!     Thank you for choosing [facility] for your care. Our goal is always to provide you with excellent care. Hearing back from our patients is one way we can continue to improve our services. Please take a few minutes to complete the written survey that you may receive in the mail after you visit with us. Thank you!        Patient Information     Date Of Birth          1/31/1928        Designated Caregiver       Most Recent Value    Caregiver    Will someone help with your care after discharge? yes    Name of designated caregiver Rosa Dhillon    Phone number of caregiver     Caregiver address Same as patient      About your hospital stay     You were admitted on:  December 31, 2017 You last received care in the:  [place] [address] [facility]    You were discharged on:  January 4, 2018        Reason for your hospital stay       TIA, Rt carotid stenosis.                  Who to Call     For medical emergencies, please call 911.  For non-urgent questions about your medical care, please call your primary care provider or clinic, [phone]  For questions related to your surgery, please call your surgery clinic        Attending Provider     Provider Specialty    Santhosh Oliver MD Emergency Medicine    [facility]       Primary Care Provider Office Phone # Fax #    Matt Morrissey [phone]      After Care Instructions     Activity       Your activity upon discharge: activity as tolerated.  May shower.            Advance Diet as Tolerated       Follow this diet upon discharge: Orders Placed This Encounter      Diet      Regular Diet Adult            Diet       Follow this diet upon discharge: Orders Placed This Encounter      Room Service      Advance Diet as Tolerated: Regular            General  info for SNF       Length of Stay Estimate: Short Term Care: Estimated # of Days <30  Condition at Discharge: Improving  Level of care:skilled   Rehabilitation Potential: Good  Admission H&P remains valid and up-to-date: Yes  Recent Chemotherapy: N/A  Use Nursing Home Standing Orders: Yes            Mantoux instructions       Give two-step Mantoux (PPD) Per Facility Policy Yes                  Follow-up Appointments     Follow Up and recommended labs and tests       Follow up with retirement physician.  The following labs/tests are recommended: BMP in 3-5 days to follow up on potassium.            Follow-up and recommended labs and tests        Follow up with me,  Roland Rodriguez, within 2 weeks. to evaluate after surgery.  The following labs/tests are recommended: Carotid Duplex in 4 weeks..    Outpatient neurology visit in 4-6 weeks for TIA follow up.                  Additional Services     Occupational Therapy Adult Consult       Evaluate and treat as clinically indicated.    Reason:  TIA            Physical Therapy Adult Consult       Evaluate and treat as clinically indicated.    Reason:  TIA                  Further instructions from your care team       Carotid Endartectomy  Post-Operative Instructions    Typical length of stay in the hospital is 1-2 days.  On occasion, an evening in the Intensive Care Unit may be required for blood pressure regulation.    Activity    Most people are able to resume normal activities 1-2 weeks after surgery.  The key is to gradually increase your level of activity as you are able.  It is not uncommon to feel easily fatigued - this will improve with time.      You should avoid heavy lifting more than 30 lbs for 1 week.      You may return to work when you feel able - typically 1-2 weeks after surgery, depending on the type of work you do.      You should not drive a car for 1 week after surgery.  In addition,  you can not be taking prescription strength pain medication  and you must feel strong enough to drive safely.    Incision Care    You can shower or bathe 2 days after surgery - avoid rubbing and soaking your incision.      You may have strips of white tape called  steri-strips  across your incision; they should stay on for 5-7 days or until the ends start to curl up.  You can then remove the steri-strips, or we will remove them at your post-operative appointment.      Avoid shaving directly over the incision for 2-3 weeks.    Common Concerns    You may notice mild skin numbness on the side of your surgery in your neck, chin, face, and earlobe.  This is due to nerve  irritation  and will gradually resolve over the next several months.  Call our office if you experience any short-lasting episode of numbness or weakness affecting one side of your body.      Some bruising and firmness around you incision can be expected.  This will soften and resolve over the next few weeks.  You may notice that some discoloration spreads to an area lower than the incision, such as the shoulder, neck or upper chest.  Likewise, swelling can occur near the incision or below the chin.  Call our office if the swelling or bruising worsens, or if you have difficulty swallowing.    Diet    You may resume a regular diet before you leave the hospital.  You may find that it is best to try smaller, more frequent meals until your appetite returns.    Medications    You may be started on a blood thinner after surgery - typically Aspirin and / or Plavix.      You may be given a prescription for pain medication after surgery.  If you need to have this refilled, please call your pharmacy where you had it filled.  They will then call us for approval.  Please do not call after hours for a refill on pain medication.    Post-Operative Appointments    You need to see your surgeon in the clinic approximately 1-2 weeks after surgery.  Post-operative appointment:   Date: _____________________________  Time:  "____________________________  Dr. ______________________________      You will have an ultrasound test and evaluation with your surgeon 90 days (3 months) after surgery.      We will notify you by mail when you are due to schedule further ultrasound testing and evaluation; typically this is done annually.     When You Should Call Our Office    Body aches, chills or temperature greater than 101      Incision redness or drainage      Loss of vision in one eye      Loss of strength / weakness in one side of your body      Severe headache      Difficulty with speech      If you will be having an invasive procedure, such as a colonoscopy or dental work within one year of your surgery, you may need to take an antibiotic prior to the procedure if you had a Dacron patch with your surgery; please call us so we can assist you with this.    Call our main number, [phone], for assistance with any concerns or questions.     Revised 5/2014    Pending Results     No orders found from 12/29/2017 to 1/1/2018.            Statement of Approval     Ordered          01/04/18 1605  I have reviewed and agree with all the recommendations and orders detailed in this document.  EFFECTIVE NOW     Approved and electronically signed by:  Gagan Jerome MD             Admission Information     Date & Time Provider Department Dept. Phone    12/31/2017 Meseret Tidwell MD [place] [address] [facility] [phone]      Your Vitals Were     Blood Pressure Pulse Temperature Respirations Height Weight    89/45 (BP Location: Left arm) 69 98.5  F (36.9  C) (Oral) 16 1.727 m (5' 8\") 74.4 kg (164 lb 0.4 oz)    Pulse Oximetry BMI (Body Mass Index)                94% 24.94 kg/m2          MyChart Information     [employer] lets you send messages to your doctor, view your test results, renew your prescriptions, schedule appointments and more. To sign up, go to [email or web address] . Click on \"Log in\" on the left side of the screen, " "which will take you to the Welcome page. Then click on \"Sign up Now\" on the right side of the page.     You will be asked to enter the access code listed below, as well as some personal information. Please follow the directions to create your username and password.     Your access code is: [email or web address]  Expires: 2018 11:54 AM     Your access code will  in 90 days. If you need help or a new code, please call your [facility] clinic or [phone].        Care EveryWhere ID     This is your Care EveryWhere ID. This could be used by other organizations to access your [facility] medical records  [id number]        Equal Access to Services     LAURENCE JACOBSON : Shay Brooks, laura sweeney, ashley kirby, maurice miranda. So [facility] [phone].    ATENCIÓN: Si habla español, tiene a garvey disposición servicios gratuitos de asistencia lingüística. Llame al [phone].    We comply with applicable federal civil rights laws and [place] laws. We do not discriminate on the basis of race, color, national origin, age, disability, sex, sexual orientation, or gender identity.               Review of your medicines      START taking        Dose / Directions    cholecalciferol 53772 UNITS capsule   Commonly known as:  VITAMIN D3   Used for:  Vitamin D deficiency        Dose:  1 capsule   Take 1 capsule (50,000 Units) by mouth once a week   Quantity:  12 capsule   Refills:  0       exenatide 5 MCG/0.02ML Sopn injection   Commonly known as:  BYETTA   Used for:  Type 2 diabetes mellitus with other specified complication, without long-term current use of insulin (H)        Dose:  5 mcg   Inject 5 mcg Subcutaneous 2 times daily (before meals)   Quantity:  1 Syringe   Refills:  0         CONTINUE these medicines which may have CHANGED, or have new prescriptions. If we are uncertain of the size of tablets/capsules you have at home, strength may be listed as something that might " have changed.        Dose / Directions    HYDROcodone-acetaminophen 5-325 MG per tablet   Commonly known as:  NORCO   This may have changed:    - when to take this  - reasons to take this   Used for:  Neck pain        Dose:  1 tablet   Take 1 tablet by mouth every 6 hours as needed for moderate to severe pain (PRN for post surgical pain.)   Quantity:  12 tablet   Refills:  0       metoprolol 25 MG 24 hr tablet   Commonly known as:  TOPROL-XL   Indication:  High Blood Pressure Disorder   This may have changed:  Another medication with the same name was removed. Continue taking this medication, and follow the directions you see here.   Used for:  Cardiomyopathy, unspecified type (H)        Dose:  12.5 mg   Take 0.5 tablets (12.5 mg) by mouth daily   Refills:  0         CONTINUE these medicines which have NOT CHANGED        Dose / Directions    AMITRIPTYLINE HCL PO        Dose:  25 mg   Take 25 mg by mouth nightly as needed for sleep   Refills:  0       aspirin 81 MG EC tablet   Used for:  Transient cerebral ischemia, unspecified type        Dose:  81 mg   Take 1 tablet (81 mg) by mouth daily   Quantity:  30 tablet   Refills:  0       ATORVASTATIN CALCIUM PO        Dose:  80 mg   Take 80 mg by mouth daily   Refills:  0       blood glucose monitoring test strip   Commonly known as:  no brand specified   Used for:  Hypoglycemia        Use to test blood sugar three times daily or as directed.   Quantity:  300 each   Refills:  3       DULoxetine 30 MG EC capsule   Commonly known as:  CYMBALTA   Used for:  Polyneuropathy associated with underlying disease (H)        Dose:  30 mg   Take 1 capsule (30 mg) by mouth daily   Quantity:  90 capsule   Refills:  3       glipiZIDE 10 MG tablet   Commonly known as:  GLUCOTROL   Used for:  Type 2 diabetes mellitus with diabetic peripheral angiopathy without gangrene, without long-term current use of insulin (H)        Dose:  10 mg   Take 1 tablet (10 mg) by mouth 2 times daily (before  meals)   Quantity:  180 tablet   Refills:  3       ipratropium 0.03 % spray   Commonly known as:  ATROVENT        Dose:  2 spray   Spray 2 sprays into both nostrils every 12 hours   Refills:  0       LISINOPRIL PO        Dose:  2.5 mg   Take 2.5 mg by mouth daily   Refills:  0       METAMUCIL PO        Dose:  1 packet   Take 1 packet by mouth daily as needed   Refills:  0       METFORMIN HCL PO        Dose:  1000 mg   Take 1,000 mg by mouth 2 times daily (with meals)   Refills:  0       omeprazole 40 MG capsule   Commonly known as:  priLOSEC   Used for:  Other acute gastritis without hemorrhage        Dose:  40 mg   Take 1 capsule (40 mg) by mouth daily Take 30-60 minutes before a meal.   Quantity:  90 capsule   Refills:  3       order for DME   Used for:  Type 2 diabetes mellitus without complication (H)        Equipment being ordered: [employer] Blood Glucose meter.   Quantity:  1 Device   Refills:  0       PLAVIX PO   Used for:  Cough        Dose:  75 mg   Take 75 mg by mouth daily   Refills:  0       tamsulosin 0.4 MG capsule   Commonly known as:  FLOMAX   Used for:  Benign non-nodular prostatic hyperplasia with lower urinary tract symptoms        Dose:  0.4 mg   Take 1 capsule (0.4 mg) by mouth daily   Quantity:  90 capsule   Refills:  3            Where to get your medicines      Some of these will need a paper prescription and others can be bought over the counter. Ask your nurse if you have questions.     You don't need a prescription for these medications     cholecalciferol 23856 UNITS capsule    exenatide 5 MCG/0.02ML Sopn injection         Information about where to get these medications is not yet available     ! Ask your nurse or doctor about these medications     HYDROcodone-acetaminophen 5-325 MG per tablet                Protect others around you: Learn how to safely use, store and throw away your medicines at www.disposemymeds.org.             Medication List: This is a list of all your  medications and when to take them. Check marks below indicate your daily home schedule. Keep this list as a reference.      Medications           Morning Afternoon Evening Bedtime As Needed    AMITRIPTYLINE HCL PO   Take 25 mg by mouth nightly as needed for sleep                                   aspirin 81 MG EC tablet   Take 1 tablet (81 mg) by mouth daily   Last time this was given:  325 mg on 1/4/2018  8:57 AM                                   ATORVASTATIN CALCIUM PO   Take 80 mg by mouth daily   Last time this was given:  80 mg on 1/4/2018  8:56 AM                                   blood glucose monitoring test strip   Commonly known as:  no brand specified   Use to test blood sugar three times daily or as directed.                                cholecalciferol 50498 UNITS capsule   Commonly known as:  VITAMIN D3   Take 1 capsule (50,000 Units) by mouth once a week                                   DULoxetine 30 MG EC capsule   Commonly known as:  CYMBALTA   Take 1 capsule (30 mg) by mouth daily   Last time this was given:  30 mg on 1/4/2018  8:57 AM                                   exenatide 5 MCG/0.02ML Sopn injection   Commonly known as:  BYETTA   Inject 5 mcg Subcutaneous 2 times daily (before meals)   Last time this was given:  5 mcg on 1/4/2018  4:29 PM                                      glipiZIDE 10 MG tablet   Commonly known as:  GLUCOTROL   Take 1 tablet (10 mg) by mouth 2 times daily (before meals)                                      HYDROcodone-acetaminophen 5-325 MG per tablet   Commonly known as:  NORCO   Take 1 tablet by mouth every 6 hours as needed for moderate to severe pain (PRN for post surgical pain.)                                   ipratropium 0.03 % spray   Commonly known as:  ATROVENT   Spray 2 sprays into both nostrils every 12 hours   Last time this was given:  2 sprays on 1/4/2018  6:55 AM                                      LISINOPRIL PO   Take 2.5 mg by mouth daily                                    METAMUCIL PO   Take 1 packet by mouth daily as needed                                   METFORMIN HCL PO   Take 1,000 mg by mouth 2 times daily (with meals)                                      metoprolol 25 MG 24 hr tablet   Commonly known as:  TOPROL-XL   Take 0.5 tablets (12.5 mg) by mouth daily   Last time this was given:  12.5 mg on 1/3/2018  8:45 AM                                   omeprazole 40 MG capsule   Commonly known as:  priLOSEC   Take 1 capsule (40 mg) by mouth daily Take 30-60 minutes before a meal.   Last time this was given:  40 mg on 1/4/2018  6:40 AM                                      order for DME   Equipment being ordered: [employer] Blood Glucose meter.                                PLAVIX PO   Take 75 mg by mouth daily   Last time this was given:  75 mg on 1/2/2018  8:49 AM                                   tamsulosin 0.4 MG capsule   Commonly known as:  FLOMAX   Take 1 capsule (0.4 mg) by mouth daily   Last time this was given:  0.4 mg on 1/4/2018  8:57 AM

## 2017-12-31 NOTE — IP AVS SNAPSHOT
"` `     [place] [address] [facility]: [phone]                                              INTERAGENCY TRANSFER FORM - NURSING   2017                    Hospital Admission Date: 2017  SID AGUIAR   : 1928  Sex: Male        Attending Provider: Meseret Tidwell MD     Allergies:  Oxycodone, Sulfa Drugs, Tetracycline    Infection:  None   Service:  HOSPITALIST    Ht:  1.727 m (5' 8\")   Wt:  74.4 kg (164 lb 0.4 oz)   Admission Wt:  74.7 kg (164 lb 11.2 oz)    BMI:  24.94 kg/m 2   BSA:  1.89 m 2            Patient PCP Information     Provider PCP Type    Matt Morrissey MD General    Matt Morrissey MD Internal Medicine      Current Code Status     Date Active Code Status Order ID Comments User Context       Prior      Code Status History     Date Active Date Inactive Code Status Order ID Comments User Context    2018  3:04 PM  Full Code [id number]  Gagan Jerome MD Outpatient    2017  1:57 PM 2018  3:04 PM Full Code [id number]  Meseret Tidwell MD Inpatient    2017 10:07 AM 2017  1:57 PM Full Code [id number]  Faustino Aguilar MD Outpatient    2017  8:56 PM 2017 10:07 AM Full Code [id number]  Blaze Torres MD Inpatient    2017  3:57 PM 2017  8:56 PM Full Code [id number]  rTa Reynoso MD Outpatient    2017  1:55 PM 2017  3:57 PM Full Code [id number]  Blaze Rose MD Inpatient    2016  1:26 PM 2017  1:55 PM Full Code [id number]  Paloma Chen MD Outpatient    2016  3:13 AM 2016  1:26 PM Full Code [id number]  Faustino Aguilar MD Inpatient    3/2/2012 11:45 AM 2016  3:13 AM Full Code [id number]  Wilmar Medrano MD Outpatient      Advance Directives        Does patient have a scanned Advance Directive/ACP document in EPIC?           No        Hospital Problems as of 2018              Priority Class Noted POA    Stroke of unknown etiology (H) Medium  " 12/31/2017 Yes    Carotid stenosis Medium  1/3/2018 Yes      Non-Hospital Problems as of 1/4/2018              Priority Class Noted    Coronary artery disease involving native coronary artery of native heart without angina pectoris Medium  10/20/2016    Mild cognitive impairment Medium  10/20/2016    Hyperlipidemia LDL goal <70 Medium  10/20/2016    Benign non-nodular prostatic hyperplasia with lower urinary tract symptoms Medium  10/20/2016    Gastroesophageal reflux disease without esophagitis Medium  10/20/2016    Cerebrovascular accident (H) Medium  10/20/2016    Carotid artery stenosis Medium  10/20/2016    Abdominal aortic aneurysm (H) Medium  12/25/2016    Lumbar back pain Medium  12/30/2016    Benign essential hypertension Medium  1/6/2017    TIA (transient ischemic attack) Medium  1/20/2017    Paroxysmal atrial fibrillation (H) Medium  Unknown    Ischemic cardiomyopathy Medium  Unknown    Aortic root dilatation (H) Medium  Unknown    Physical deconditioning Medium  6/12/2017    Diabetic ulcer of left foot associated with diabetes mellitus due to underlying condition (H) Medium  6/12/2017    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) Medium  6/12/2017    Anemia due to blood loss, acute Medium  6/13/2017    Diarrhea, unspecified type Medium  6/16/2017    Nausea Medium  6/21/2017    Acute pain of left shoulder Medium  8/7/2017    Balance problems Medium  8/7/2017    Generalized muscle weakness Medium  8/7/2017    PAD (peripheral artery disease) (H) Medium  Unknown    Aortic stenosis Medium  Unknown    RBBB with left anterior fascicular block Medium  Unknown      Immunizations     Name Date      Influenza (High Dose) 3 valent vaccine 11/03/17     Influenza (High Dose) 3 valent vaccine 10/16/16     Pneumo Conj 13-V (2010&after) 12/05/14     Pneumococcal 23 valent 01/01/00     TD (ADULT, 7+) 11/01/10     Zoster vaccine, live 01/01/12          END      ASSESSMENT     Discharge Profile Flowsheet     EXPECTED  "DISCHARGE     Resources List  -- (n/a) 12/07/17 1619    Expected Discharge Date  01/04/18 (TCU pending postop evals) 01/03/18 0924   Other Resources  -- (NA) 12/07/17 1619    DISCHARGE NEEDS ASSESSMENT     PAS Number  -- (n/a) 12/07/17 1619    Concerns To Be Addressed  -- (NA) 07/14/17 1612   Senior Linkage Line Referral Placed  -- (n/a) 12/07/17 1619    Concerns Comments  -- (NA) 07/14/17 1612   F/U Appointment Brochure Provided  -- (NA) 12/07/17 1619    Equipment Currently Used at Home  cane, straight;walker, rolling 01/01/18 1437   Referrals Placed  Senior Linkage Line 12/07/17 1619    Transportation Available  family or friend will provide 01/01/18 1437   SKIN      # of Referrals Placed by CTS  Senior Linkage Line 01/04/18 1108   Inspection of bony prominences  Procedural focused assessment (identify areas inspected) 01/03/18 2311    Does Patient Need a Referral for Clinic CC  Yes 12/29/16 1100   Skin WDL  ex 01/04/18 1234    GASTROINTESTINAL (ADULT,PEDIATRIC,OB)     Skin Color/Characteristics  pale;bruised (ecchymotic) 01/04/18 1249    GI WDL  WDL 01/04/18 1234   Skin Temperature  warm 01/04/18 1032    All Quadrants Bowel Sounds  hypoactive 01/04/18 0341   Skin Integrity  incision(s) 01/04/18 1234    Last Bowel Movement  12/31/17 01/01/18 1813   Skin Moisture  flaky;dry 01/04/18 0632    Passing flatus  yes 01/04/18 1032   Skin Elasticity  quick return to original state 01/03/18 0936    COMMUNICATION ASSESSMENT     SAFETY      Patient's communication style  spoken language (English or Bilingual) 12/31/17 1123   Safety WDL  WDL 01/04/18 1234    FINAL RESOURCES     All Alarms  alarm(s) activated and audible 01/04/18 1234                 Assessment WDL (Within Defined Limits) Definitions           Safety WDL     Effective: 09/28/15    Row Information: <b>WDL Definition:</b> Bed in low position, wheels locked; call light in reach; upper side rails up x 2; ID band on<br> <font color=\"gray\"><i>Item=AS safety " "wdl>>List=AS safety wdl>>Version=F14</i></font>      Skin WDL     Effective: 09/28/15    Row Information: <b>WDL Definition:</b> Warm; dry; intact; elastic; without discoloration; pressure points without redness<br> <font color=\"gray\"><i>Item=AS skin wdl>>List=AS skin wdl>>Version=F14</i></font>      Vitals     Vital Signs Flowsheet     VITAL SIGNS     Height  1.727 m (5' 8\") 12/31/17 1613    Temp  98.5  F (36.9  C) 01/04/18 1617   Height Method  Stated (per pt's wife ) 12/31/17 1613    Temp src  Oral 01/04/18 1617   Weight  74.4 kg (164 lb 0.4 oz) 01/04/18 0701    Resp  16 01/04/18 1617   Weight Method  Bed scale 01/04/18 0701    Pulse  69 01/04/18 1617   BSA (Calculated - sq m)  1.89 12/31/17 1613    Heart Rate  68 01/04/18 0803   BMI (Calculated)  25.09 12/31/17 1613    Pulse/Heart Rate Source  Monitor 01/04/18 1617   POSITIONING      BP  (!)  89/45 01/04/18 1617   Body Position  independently positioning 01/04/18 1032    BP Location  Left arm 01/04/18 1617   Head of Bed (HOB)  HOB at 20-30 degrees 01/03/18 2245    OXYGEN THERAPY     DAILY CARE      SpO2  94 % 01/04/18 1617   Activity Management  up in chair 01/04/18 1225    O2 Device  None (Room air) 01/04/18 1617   Activity Assistance Provided  assistance, 1 person 01/04/18 1225    Oxygen Delivery  2 LPM 01/04/18 0803   ECG      RESPIRATORY MONITORING     ECG Rhythm  Sinus rhythm 01/03/18 2245    Respiratory Monitoring (EtCO2)  38 mmHg 01/04/18 0136   ANALGESIA SIDE EFFECTS MONITORING      Integrated Pulmonary Index (IPI)  8-9 01/04/18 0136   Side Effects Monitoring: Respiratory Quality  R 01/04/18 1025    PAIN/COMFORT     Side Effects Monitoring: Respiratory Depth  N 01/04/18 1025    Patient Currently in Pain  denies 01/04/18 1223   Side Effects Monitoring: Sedation Level  1 01/04/18 1025    HEIGHT AND WEIGHT                   Patient Lines/Drains/Airways Status    Active LINES/DRAINS/AIRWAYS     Name: Placement date: Placement time: Site: Days: Last dressing " change:    Closed/Suction Drain 1 Left Neck Bulb 15 [place] 01/03/18   1859   Neck   less than 1     Peripheral IV 01/03/18 Right Lower forearm 01/03/18   1539   Lower forearm   1     Arterial Line 01/03/18 01/03/18   1553      1     Incision/Surgical Site 06/04/17 Left Foot 06/04/17       214     Incision/Surgical Site 06/06/17 Left Foot 06/06/17   1922    211     Incision/Surgical Site 01/03/18 Right Neck 01/03/18   1725    less than 1             Patient Lines/Drains/Airways Status    Active PICC/CVC     None            Intake/Output Detail Report     Date Intake       Output   Net    Shift P.O. I.V. IV Piggyback Colloid Total Urine Drains Total       Day 01/03/18 0700 - 01/03/18 1459 -- -- -- -- -- -- -- -- 0    Charisse 01/03/18 1500 - 01/03/18 2259 -- 1900 -- 500 2400 350 -- 350 2050    Noc 01/03/18 2300 - 01/04/18 0659 250 568 -- -- 818 350 25 375 443    Day 01/04/18 0700 - 01/04/18 1459 1000 -- -- -- 1000 250 -- 250 750    Charisse 01/04/18 1500 - 01/04/18 2259 -- -- -- -- -- 100 -- 100 -100      Last Void/BM       Most Recent Value    Urine Occurrence 1 [approximately 200, spilled some on floor] at 01/04/2018 1145    Stool Occurrence       Case Management/Discharge Planning     Case Management/Discharge Planning Flowsheet     REFERRAL INFORMATION     Major Change/Loss/Stressor  none 12/31/17 1504    Did the Initial Social Work Assessment result in a Social Work Case?  Yes 01/02/18 1603   Patient Personal Strengths  able to adapt 06/06/17 1517    Admission Type  inpatient 01/02/18 1603   EXPECTED DISCHARGE      Arrived From  home or self-care 01/02/18 1603   Expected Discharge Date  01/04/18 (TCU pending postop evals) 01/03/18 0924    Referral Source  physician 01/02/18 1603   ASSESSMENT/CONCERNS TO BE ADDRESSED      # of Referrals Placed by CTS  Senior Linkage Line 01/04/18 1108   Concerns To Be Addressed  -- (NA) 07/14/17 1612    Post Acute Facilities  TCU 01/03/18 0926   Concerns Comments  -- (NA) 07/14/17 1618     Reason For Consult  discharge planning 01/02/18 1603   DISCHARGE PLANNING      Record Reviewed  medical record;patient profile 01/02/18 1603   Transportation Available  family or friend will provide 01/01/18 1437    CTS Assigned to Cirilo  Maira Alaniz  01/04/18 1108   Does Patient Need a Referral for Clinic CC  Yes 12/29/16 1100    Primary Care Clinic Name  [facility]  06/09/17 1343   FINAL RESOURCES      Primary Care MD Name  Matt Morrissey  06/09/17 1343   Equipment Currently Used at Home  cane, straight;walker, rolling 01/01/18 1437    LIVING ENVIRONMENT     Resources List  -- (n/a) 12/07/17 1619    Lives With  significant other 01/02/18 1603   Other Resources  -- (NA) 12/07/17 1619    Living Arrangements  house 01/02/18 1603   PAS Number  -- (n/a) 12/07/17 1619    Provides Primary Care For  no one 01/02/18 1603   Senior Linkage Line Referral Placed  -- (n/a) 12/07/17 1619    ASSESSMENT OF FAMILY/SOCIAL SUPPORT     F/U Appointment Brochure Provided  -- (NA) 12/07/17 1619    Marital Status  Lives with Significant Other 01/02/18 1603   Referrals Placed  Senior Linkage Line 12/07/17 1619    Who is your support system?  Significant Other;Neighbor 01/02/18 1603   ABUSE RISK SCREEN      Significant Other's Name  Halina 01/02/18 1603   QUESTION TO PATIENT:  Has a member of your family or a partner(now or in the past) intimidated, hurt, manipulated, or controlled you in any way?  patient declined to answer or is unable to answer 12/31/17 1157    Description of Support System  Supportive;Involved 01/02/18 1603   QUESTION TO PATIENT: Do you feel safe going back to the place where you are living?  patient declined to answer or is unable to answer 12/31/17 1157    Support Assessment  Adequate family and caregiver support;Adequate social supports 01/02/18 1603   (R) MENTAL HEALTH SUICIDE RISK      COPING/STRESS     Are you depressed or being treated for depression?  Yes 12/31/17 1501

## 2017-12-31 NOTE — ED NOTES
Bed: ST03  Expected date:   Expected time:   Means of arrival:   Comments:  518  89 M code stroke  1117

## 2018-01-01 ENCOUNTER — APPOINTMENT (OUTPATIENT)
Dept: CARDIOLOGY | Facility: CLINIC | Age: 83
DRG: 038 | End: 2018-01-01
Attending: INTERNAL MEDICINE
Payer: MEDICARE

## 2018-01-01 ENCOUNTER — APPOINTMENT (OUTPATIENT)
Dept: PHYSICAL THERAPY | Facility: CLINIC | Age: 83
DRG: 038 | End: 2018-01-01
Attending: INTERNAL MEDICINE
Payer: MEDICARE

## 2018-01-01 ENCOUNTER — APPOINTMENT (OUTPATIENT)
Dept: OCCUPATIONAL THERAPY | Facility: CLINIC | Age: 83
DRG: 038 | End: 2018-01-01
Attending: INTERNAL MEDICINE
Payer: MEDICARE

## 2018-01-01 ENCOUNTER — APPOINTMENT (OUTPATIENT)
Dept: MRI IMAGING | Facility: CLINIC | Age: 83
DRG: 038 | End: 2018-01-01
Attending: INTERNAL MEDICINE
Payer: MEDICARE

## 2018-01-01 LAB
ANION GAP SERPL CALCULATED.3IONS-SCNC: 6 MMOL/L (ref 3–14)
BUN SERPL-MCNC: 13 MG/DL (ref 7–30)
CALCIUM SERPL-MCNC: 8.2 MG/DL (ref 8.5–10.1)
CHLORIDE SERPL-SCNC: 105 MMOL/L (ref 94–109)
CO2 SERPL-SCNC: 26 MMOL/L (ref 20–32)
CREAT SERPL-MCNC: 0.66 MG/DL (ref 0.66–1.25)
ERYTHROCYTE [DISTWIDTH] IN BLOOD BY AUTOMATED COUNT: 12.7 % (ref 10–15)
GFR SERPL CREATININE-BSD FRML MDRD: >90 ML/MIN/1.7M2
GLUCOSE BLDC GLUCOMTR-MCNC: 159 MG/DL (ref 70–99)
GLUCOSE BLDC GLUCOMTR-MCNC: 168 MG/DL (ref 70–99)
GLUCOSE BLDC GLUCOMTR-MCNC: 175 MG/DL (ref 70–99)
GLUCOSE BLDC GLUCOMTR-MCNC: 218 MG/DL (ref 70–99)
GLUCOSE SERPL-MCNC: 156 MG/DL (ref 70–99)
HCT VFR BLD AUTO: 36.5 % (ref 40–53)
HGB BLD-MCNC: 12.1 G/DL (ref 13.3–17.7)
MCH RBC QN AUTO: 30.8 PG (ref 26.5–33)
MCHC RBC AUTO-ENTMCNC: 33.2 G/DL (ref 31.5–36.5)
MCV RBC AUTO: 93 FL (ref 78–100)
PLATELET # BLD AUTO: 126 10E9/L (ref 150–450)
POTASSIUM SERPL-SCNC: 4.3 MMOL/L (ref 3.4–5.3)
RBC # BLD AUTO: 3.93 10E12/L (ref 4.4–5.9)
SODIUM SERPL-SCNC: 137 MMOL/L (ref 133–144)
WBC # BLD AUTO: 5.3 10E9/L (ref 4–11)

## 2018-01-01 PROCEDURE — 97535 SELF CARE MNGMENT TRAINING: CPT | Mod: GO | Performed by: OCCUPATIONAL THERAPIST

## 2018-01-01 PROCEDURE — A9585 GADOBUTROL INJECTION: HCPCS | Performed by: INTERNAL MEDICINE

## 2018-01-01 PROCEDURE — 25000128 H RX IP 250 OP 636: Performed by: INTERNAL MEDICINE

## 2018-01-01 PROCEDURE — 97530 THERAPEUTIC ACTIVITIES: CPT | Mod: GO | Performed by: OCCUPATIONAL THERAPIST

## 2018-01-01 PROCEDURE — 40000264 ECHO COMPLETE WITH LUMASON

## 2018-01-01 PROCEDURE — 80048 BASIC METABOLIC PNL TOTAL CA: CPT | Performed by: INTERNAL MEDICINE

## 2018-01-01 PROCEDURE — 99233 SBSQ HOSP IP/OBS HIGH 50: CPT | Performed by: PSYCHIATRY & NEUROLOGY

## 2018-01-01 PROCEDURE — 25000131 ZZH RX MED GY IP 250 OP 636 PS 637: Mod: GY | Performed by: INTERNAL MEDICINE

## 2018-01-01 PROCEDURE — 00000146 ZZHCL STATISTIC GLUCOSE BY METER IP

## 2018-01-01 PROCEDURE — 97161 PT EVAL LOW COMPLEX 20 MIN: CPT | Mod: GP | Performed by: PHYSICAL THERAPIST

## 2018-01-01 PROCEDURE — 25500064 ZZH RX 255 OP 636: Performed by: INTERNAL MEDICINE

## 2018-01-01 PROCEDURE — 40000133 ZZH STATISTIC OT WARD VISIT: Performed by: OCCUPATIONAL THERAPIST

## 2018-01-01 PROCEDURE — 70553 MRI BRAIN STEM W/O & W/DYE: CPT

## 2018-01-01 PROCEDURE — 40000193 ZZH STATISTIC PT WARD VISIT: Performed by: PHYSICAL THERAPIST

## 2018-01-01 PROCEDURE — 99232 SBSQ HOSP IP/OBS MODERATE 35: CPT | Performed by: INTERNAL MEDICINE

## 2018-01-01 PROCEDURE — 25000132 ZZH RX MED GY IP 250 OP 250 PS 637: Mod: GY | Performed by: INTERNAL MEDICINE

## 2018-01-01 PROCEDURE — A9270 NON-COVERED ITEM OR SERVICE: HCPCS | Mod: GY | Performed by: INTERNAL MEDICINE

## 2018-01-01 PROCEDURE — 12000000 ZZH R&B MED SURG/OB

## 2018-01-01 PROCEDURE — 93306 TTE W/DOPPLER COMPLETE: CPT | Mod: 26 | Performed by: INTERNAL MEDICINE

## 2018-01-01 PROCEDURE — 36415 COLL VENOUS BLD VENIPUNCTURE: CPT | Performed by: INTERNAL MEDICINE

## 2018-01-01 PROCEDURE — 97530 THERAPEUTIC ACTIVITIES: CPT | Mod: GP | Performed by: PHYSICAL THERAPIST

## 2018-01-01 PROCEDURE — 85027 COMPLETE CBC AUTOMATED: CPT | Performed by: INTERNAL MEDICINE

## 2018-01-01 PROCEDURE — 97116 GAIT TRAINING THERAPY: CPT | Mod: GP | Performed by: PHYSICAL THERAPIST

## 2018-01-01 PROCEDURE — 97166 OT EVAL MOD COMPLEX 45 MIN: CPT | Mod: GO | Performed by: OCCUPATIONAL THERAPIST

## 2018-01-01 RX ORDER — ASPIRIN 81 MG/1
81 TABLET ORAL DAILY
Status: DISCONTINUED | OUTPATIENT
Start: 2018-01-01 | End: 2018-01-01

## 2018-01-01 RX ORDER — GADOBUTROL 604.72 MG/ML
7 INJECTION INTRAVENOUS ONCE
Status: COMPLETED | OUTPATIENT
Start: 2018-01-01 | End: 2018-01-01

## 2018-01-01 RX ADMIN — IPRATROPIUM BROMIDE 2 SPRAY: 21 SPRAY NASAL at 18:11

## 2018-01-01 RX ADMIN — INSULIN GLARGINE 5 UNITS: 100 INJECTION, SOLUTION SUBCUTANEOUS at 09:18

## 2018-01-01 RX ADMIN — INSULIN ASPART 1 UNITS: 100 INJECTION, SOLUTION INTRAVENOUS; SUBCUTANEOUS at 18:09

## 2018-01-01 RX ADMIN — GADOBUTROL 7 ML: 604.72 INJECTION INTRAVENOUS at 05:58

## 2018-01-01 RX ADMIN — DULOXETINE 30 MG: 30 CAPSULE, DELAYED RELEASE ORAL at 09:18

## 2018-01-01 RX ADMIN — IPRATROPIUM BROMIDE 2 SPRAY: 21 SPRAY NASAL at 09:23

## 2018-01-01 RX ADMIN — INSULIN ASPART 1 UNITS: 100 INJECTION, SOLUTION INTRAVENOUS; SUBCUTANEOUS at 09:18

## 2018-01-01 RX ADMIN — TAMSULOSIN HYDROCHLORIDE 0.4 MG: 0.4 CAPSULE ORAL at 09:18

## 2018-01-01 RX ADMIN — CLOPIDOGREL 75 MG: 75 TABLET, FILM COATED ORAL at 09:18

## 2018-01-01 RX ADMIN — ASPIRIN 325 MG: 325 TABLET, DELAYED RELEASE ORAL at 09:17

## 2018-01-01 RX ADMIN — INSULIN ASPART 1 UNITS: 100 INJECTION, SOLUTION INTRAVENOUS; SUBCUTANEOUS at 13:21

## 2018-01-01 RX ADMIN — Medication 12.5 MG: at 09:17

## 2018-01-01 RX ADMIN — ATORVASTATIN CALCIUM 80 MG: 80 TABLET, FILM COATED ORAL at 09:18

## 2018-01-01 RX ADMIN — SULFUR HEXAFLUORIDE 5 ML: KIT at 10:35

## 2018-01-01 ASSESSMENT — VISUAL ACUITY
OU: OTHER (SEE COMMENT)
OU: OTHER (SEE COMMENT);GLASSES
OU: OTHER (SEE COMMENT);GLASSES
OU: OTHER (SEE COMMENT)

## 2018-01-01 ASSESSMENT — ACTIVITIES OF DAILY LIVING (ADL)
ADLS_ACUITY_SCORE: 15

## 2018-01-01 NOTE — PROGRESS NOTES
VASCULAR  SURGERY    Dustin Pearce was admitted yesterday after noting confusion and slurred speech.  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  TIA in 1/2017.  Ischemic cardiomyopathy with ECHO today  EF=35-40%,hyperlipidemia (recent LDL=39), Type II DM, HTN.  On ASA/Plavix.    CTA= chronic occluded left ICA, Stable  50% Right ICA stenosis with no obvious ulceration, tortuous distal right ICA in cervical and intracerebral portions with fairly normal [place]. Right stenosis with calcification and on review appears to be at reported 50% range    MRI= old left frontal CVI with no acute changes      SjR3d=3.6    SCr=0.83    ASSESSMENT:  ? New left hemisphere event.  Chronic occlusion Left ICA and only moderate changes on right.                               No surgical treatment options for left ICA occlusion.                                                              Right ICA stenosis if only 50% would not warrant any intervention. Only concern would be for high grade stenosis decreasing intracerebral perfusion and thus causing global symptoms due to contralateral ICA occlusion.  This usually needs to be >70-80% to be symptomatic.  We will get Carotid Duplex in AM to ensure not a critical stenosis. (Duplex on 06/02/17 with Right ICA NOK=842 cm/s)      We will see patient formally in AM.      Roland Rodriguez MD   01/01/2018 @ 1500 hrs.

## 2018-01-01 NOTE — PLAN OF CARE
Problem: Patient Care Overview  Goal: Plan of Care/Patient Progress Review  Outcome: Improving  Disoriented to time and place, forgetful. Baseline left side visual neglect, blurred vision due to macular degeneration, otherwise Neuros intact. VSS. Tele SR with BBB. DD3 with thin liquid diet. Up with one and gait belt. Denies pain. Plan for MRI this morning, nursing to continue to monitor.

## 2018-01-01 NOTE — PLAN OF CARE
Problem: Patient Care Overview  Goal: Plan of Care/Patient Progress Review  Outcome: Improving  Alert, disoriented to time, confused, Ax1/2, tolerating oral intake, neuros intact. Left eye macular degeneration per SO, [place] (hearing aids not present), tele SR, BS, flatus, denies pain.

## 2018-01-01 NOTE — PLAN OF CARE
Problem: Stroke (Ischemic) (Adult)  Goal: Signs and Symptoms of Listed Potential Problems Will be Absent, Minimized or Managed (Stroke)  Signs and symptoms of listed potential problems will be absent, minimized or managed by discharge/transition of care (reference Stroke (Ischemic) (Adult) CPG).   Outcome: Improving  A&O x3, disoriented to date and can be confused when given instructions. Slurred speech improved. Left sided weakness improving through day. T2DM; , 168 w/ 1 unit coverage for both. Fluids d/c'd. Left field cut. Hx of macular degeneration. Up w/1 and walker to bathroom. Plan to d/c to TCU. Family and patient prefer [facility] in [place].

## 2018-01-01 NOTE — PROGRESS NOTES
01/01/18 0800   Quick Adds   Type of Visit Initial Occupational Therapy Evaluation   Living Environment   Lives With significant other   Living Arrangements house   Home Accessibility stairs (2 railings present);stairs to enter home;stairs within home   Number of Stairs to Enter Home 2   Number of Stairs Within Home 14   Stair Railings at Home inside, present at both sides   Transportation Available family or friend will provide;car   Functional Level Prior   Ambulation 1-->assistive equipment   Transferring 1-->assistive equipment   Toileting 1-->assistive equipment   Bathing 0-->independent   Dressing 0-->independent   Eating 0-->independent   Communication 0-->understands/communicates without difficulty   Swallowing 0-->swallows foods/liquids without difficulty   Cognition 1 - attention or memory deficits   Fall history within last six months yes   Number of times patient has fallen within last six months 4   Which of the above functional risks had a recent onset or change? fall history   Prior Functional Level Comment Per SO report pt rarely uses AD for mobility in the home, and is able to complete ADLs IND'ly, pt's significant other works full time, neighbors check in on him during the day, SO manages meds and finances, and escorts on stairs due to vision loss.    General Information   Onset of Illness/Injury or Date of Surgery - Date 12/31/17   Referring Physician Meseret Tidwell MD   Patient/Family Goals Statement None specifically stated   Additional Occupational Profile Info/Pertinent History of Current Problem Pt is an 89 yo male with dementia admitted for confusion, undergoing workup for CVA. Pt has macular degeneration and reports no vision in L eye, pt has history of CVA and cardiac issues, toe amputation, please see chart for full H & P   Precautions/Limitations fall precautions;swallowing precautions   Cognitive Status Examination   Orientation person   Level of Consciousness alert;confused    Able to Follow Commands mild impairment   Personal Safety (Cognitive) impulsive;at risk behaviors demonstrated   Memory impaired   Organization/Problem Solving Problem solving impaired   Executive Function Impulsive   Cognitive Comment Pt shows decreased short term memory and decreased ability to follow direction.    Visual Perception   Visual Perception Wears glasses   Visual Perception Comments Pt has macular degeneration and does not have sight in L eye, pt is able to see light in L eye, pt reports his glasses are very helpful   Sensory Examination   Sensory Quick Adds No deficits were identified   Pain Assessment   Patient Currently in Pain No   Range of Motion (ROM)   ROM Comment BUE WFL for ADLs,    Strength   Strength Comments BUE WFL for ADLs   Hand Strength   Hand Strength Comments BUE WFL for ADLs   Coordination   Upper Extremity Coordination No deficits were identified   Coordination Comments WFL for ADLs   Mobility   Bed Mobility Comments SBA   Transfer Skill: Bed to Chair/Chair to Bed   Level of [place]: Bed to Chair minimum assist (75% patients effort)   Physical Assist/Nonphysical Assist: Bed to Chair verbal cues;1 person assist;nonverbal cues (demo/gestures)   Transfer Skill: Sit to Stand   Level of [place]: Sit/Stand contact guard   Physical Assist/Nonphysical Assist: Sit/Stand supervision;verbal cues;nonverbal cues (demo/gestures);1 person assist   Transfer Skill: Toilet Transfer   Level of [place]: Toilet minimum assist (75% patients effort)   Physical Assist/Nonphysical Assist: Toilet supervision;verbal cues;nonverbal cues (demo/gestures);1 person assist   Assistive Device grab bars;rolling walker   Balance   Balance Comments decreased static and dynamic balance   Bathing   Level of [place] minimum assist (75% patients effort)   Upper Body Dressing   Level of [place]: Dress Upper Body stand-by assist   Physical Assist/Nonphysical Assist: Dress Upper Body  "supervision;verbal cues   Lower Body Dressing   Level of [place]: Dress Lower Body minimum assist (75% patients effort)   Physical Assist/Nonphysical Assist: Dress Lower Body supervision;verbal cues;set-up required   Toileting   Level of [place]: Toilet contact guard   Physical Assist/Nonphysical Assist: Toilet supervision;verbal cues   Grooming   Level of [place]: Grooming stand-by assist   Physical Assist/Nonphysical Assist: Grooming supervision;verbal cues   Eating/Self Feeding   Level of [place]: Eating stand-by assist   Physical Assist/Nonphysical Assist: Eating supervision   Activities of Daily Living Analysis   Impairments Contributing to Impaired Activities of Daily Living balance impaired;cognition impaired  (vision impaired)   General Therapy Interventions   Planned Therapy Interventions ADL retraining;cognition   Clinical Impression   Criteria for Skilled Therapeutic Interventions Met yes, treatment indicated   OT Diagnosis Reduced IND in ADLs   Influenced by the following impairments vision, balance, cognition   Assessment of Occupational Performance 5 or more Performance Deficits   Identified Performance Deficits dressing, eating, G/H, transfers and ambulation, dressing, bathing   Clinical Decision Making (Complexity) Moderate complexity   Therapy Frequency daily   Predicted Duration of Therapy Intervention (days/wks) 3 days   Anticipated Discharge Disposition Home with Assist;Home with Home Therapy;Transitional Care Facility   Risks and Benefits of Treatment have been explained. Yes   Patient, Family & other staff in agreement with plan of care Yes   [facility] AM-PAC  \"6 Clicks\" Daily Activity Inpatient Short Form   1. Putting on and taking off regular lower body clothing? 3 - A Little   2. Bathing (including washing, rinsing, drying)? 3 - A Little   3. Toileting, which includes using toilet, bedpan or urinal? 3 - A Little   4. Putting on and taking off regular upper body " clothing? 3 - A Little   5. Taking care of personal grooming such as brushing teeth? 3 - A Little   6. Eating meals? 3 - A Little   Daily Activity Raw Score (Score out of 24.Lower scores equate to lower levels of function) 18   Total Evaluation Time   Total Evaluation Time (Minutes) 10

## 2018-01-01 NOTE — PROGRESS NOTES
Vascular Neurology Progress Note    ____________________________________________________________    Impression:  Dustin Pearce is a 89 year old male year old with PVD s/p amputation of R foot, HTN, DMII, CAD, recurrent GI bleeding, paroxysmal Afib not on anticoagulation, admitted for neglect and left-sided weakness.  CT Head showed a chronic L frontal lobe stroke, CTA showed chronic L. ICA occlusion and moderate R. ICA stenosis (~60%), CTP showed no deficits.  MRI showed no acute stroke.  The patient appears to be nearly back to normal per his family.    Stroke evaluation: LDL 61, A1c 8.6.  BPs fairly normotensive.  Vessels as above, with heterogeneous appearing R. ICA bifurcation plaque.  Known Afib, not anticoagulated due to recurrent GI bleeds secondary to diverticulosis--he has tolerated dual antiplatelet.      Recommendations:  1. Symptomatic R. ICA stenosis.  Degree of stenosis by NASCET criteria appears slightly higher than reported on CTA (~60%), which would be consistent with previous carotid ultrasound.  Long discussion with family regarding why current recommendation would be to consider carotid revascularization in the setting of symptomatic stenosis when they had previously been told intervention was not indicated (due to carotid being asymptomatic).  --despite age and likely mild cognitive impairment, patient lives at home and is alone most of the day with good reported quality of life  --contralateral occlusion likely favors carotid stenting over CEA, will consult Vasc Surgery and discuss with [facility] and patient/family  --Continue aspirin/plavix/high dose atorvastatin  --A1c goal <6.5-7.0, long term.  SSI for blood glucose control    Stroke Education provided including signs/symptoms of a stroke and the importance of timely treatment.    Please contact the Stroke Service with any questions.  Thank you.      Onel Fortune MD, MS  January 1, 2018      I have personally spent a total of 60  minutes consulting with his medical providers and assessing the patient today, with more than 50% of this time spent in consultation, coordination of care, and discussion with the patient and/or family regarding diagnostic results, prognosis, symptom management, risks and benefits of management options, and development of plan of care.      ____________________________________________________________________      24 hour events:  Patient has returned to near baseline.      [employer] Stroke Scale       Score    Level of consciousness: (0)   Alert, keenly responsive    LOC questions: (0)   Answers both questions correctly    LOC commands: (0)   Performs both tasks correctly    Best gaze: (0)   Normal    Visual: (0)   No visual loss    Facial palsy: (0)   Normal symmetrical movements    Motor arm (left): (0)   No drift    Motor arm (right): (0)   No drift    Motor leg (left): (0)   No drift    Motor leg (right): (0)   No drift    Limb ataxia: (0)   Absent    Sensory: (0)   Normal- no sensory loss    Best language: (0)   Normal- no aphasia    Dysarthria: (0)   Normal    Extinction and inattention: (0)   No abnormality        Total Score:  0       Vital Signs:  B/P: 117/57, T: 98.2, P: 76, R: 16       Neuro:  Mental status: Awake, alert, wife and son do most of the talking, patient has low vision and is hard of hearing.  Oriented x3. Speech is slow, sparse, but patient able to express himself. Comprehension and repetition intact. No dysarthria.  Poor historian.  No residual neglect.  Cranial nerves:  Pupils equal and reactive.  EOMI, visual acuity poor due to macular degeneration, visual fields full, face symmetric, facial sensation intact, shoulder shrug strong, palate rise symmetric, tongue/uvula midline, hard of hearing.  Motor: Tone normal. 5/5 strength in all 4 extremities.   Reflexes: deferred  Sensory: Intact to light touch, temp.  Decreased in length dependent manner  Coordination: Finger nose  finger intact bilaterally, no dysmetria, normal heel-shin test bilaterally  Gait: deferred    Labs/Studies:  CBC RESULTS:   Recent Labs   Lab Test  01/01/18   0815   WBC  5.3   RBC  3.93*   HGB  12.1*   HCT  36.5*   MCV  93   MCH  30.8   MCHC  33.2   RDW  12.7   PLT  126*       Last Basic Metabolic Panel:  Lab Results   Component Value Date     01/01/2018      Lab Results   Component Value Date    POTASSIUM 4.3 01/01/2018     Lab Results   Component Value Date    CHLORIDE 105 01/01/2018     Lab Results   Component Value Date    ALLISON 8.2 01/01/2018     Lab Results   Component Value Date    CO2 26 01/01/2018     Lab Results   Component Value Date    BUN 13 01/01/2018     Lab Results   Component Value Date    CR 0.66 01/01/2018     Lab Results   Component Value Date     01/01/2018       Lab Results   Component Value Date    INR 0.99 12/31/2017    INR 1.03 01/20/2017       Lab Results   Component Value Date    LDL 61 12/31/2017     Lab Results   Component Value Date    A1C 8.6 12/31/2017    A1C 8.9 12/14/2017    A1C 8.7 12/01/2017       Imaging:  Relevant findings as per the Impression above.

## 2018-01-01 NOTE — H&P
"PRIMARY CARE PHYSICIAN:  Matt Morrissey MD.      PRIMARY CARDIOLOGIST:  Rafa Samuel MD.      CHIEF COMPLAINT:  Slurred speech and confusion.      HISTORY OF PRESENT ILLNESS:  Dustin Pearce is a pleasant 89-year-old male who has a history of a stroke in 1999 and a TIA in 01/2017 who presented to the Emergency Room with confusion and slurred speech.  His wife was interviewed and provides most of the history as well as the ER records.  The patient arrived by EMS.  According to the patient's wife, he was last seen normally last night before bed.  He woke up at 5:30 in the morning, which is unusual for him, and, per his wife, he seems confused and \"not quite right.\"  He went back to sleep.  She then said that he awoke around 8:30 a.m. with slurred speech and persistent confusion.  The patient was brought to the Emergency Room, where he was seen by Neurology for acute stroke.      The patient has a history of a stroke in 1999 as above and TIA in 01/2017.  He also has a history of coronary artery disease with a possible MI in the 1970s, hypertension, dyslipidemia, chronically occluded left carotid artery, type 2 diabetes, dyslipidemia, macular degeneration and BPH.  The patient was last seen by Cardiology on 12/14/20117.  They noted that the patient had an echocardiogram in the past that showed moderately reduced LV systolic function with an LVEF of 35-40% with severe inferolateral wall hypokinesia and mild aortic stenosis.  A Lexiscan scan earlier this year showed medium-sized perfusion defect of severe intensity involving the mid to basal inferior and inferoseptal wall as well as basal inferolateral wall that was mildly reversible, consistent with previous MI with mild ezekiel-infarct ischemia.  His LVEF was 37% on stress test.  The patient, given that he was asymptomatic with no anginal symptoms and not in congestive heart failure, opted for medical management at that time.      The patient is already on aspirin and " Plavix.  Plavix was added after his stroke by Neurology.  He is also on Lipitor, lisinopril and beta blocker.  His last LDL was well controlled at 39.      PAST MEDICAL HISTORY:   1.  History of chronically occluded carotid artery.   2.  History of paroxysmal atrial fibrillation.   3.  History of hypertension.   4.  History of hyperlipidemia.   5.  History of acute MI in the 1970s.   6.  History of GERD.   7.  History of DJD.   8.  Type 2 diabetes with peripheral neuropathy.  His last A1c from 12/14/2017 was 8.9.   9.  History of cardiomyopathy with an EF of 35-40%.   10.  History of benign prostatic hypertrophy.   11.  History of aortic root dilatation.   12.  History of depression.   13.  Hospitalization in 06/2017 for diabetic infected ulcer wound of the fifth toe on the left with abscess and overlying cellulitis, status post left partial fifth ray amputation on 06/04/2017, status post revision I&D with further resection of chronic tissue on 06/06/2017.   14.  History of peripheral arterial disease, status post left MEG stent and left SFA angioplasty on 06/02/2017.   15.  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   16.  History of mild cognitive impairment.   17.  History of hearing loss.  He normally uses hearing aids.      ALLERGIES:  Oxycodone, sulfa and tetracycline.      PAST SURGICAL HISTORY:   1.  History of left toe amputation as dictated above.   2.  History of cholecystectomy.   3.  History of laser lithotripsy.   4.  History of hernia repair.   5.  History of I&D of the foot.   6.  History of rotator cuff repair.      PYWRX-OF-BLHBUMGII MEDICATIONS:   1.  Amitriptyline 25 mg nightly p.r.n. sleep.   2.  Aspirin 81 mg p.o. daily.   3.  Atorvastatin 80 mg daily.   4.  Plavix 75 mg daily.   5.  Cymbalta 30 mg daily.   6.  Glipizide 10 mg b.i.d.   7.  Norco 5/325 one tab q.4h. p.r.n. moderate to severe pain.   8.  Atrovent nasal spray 2 sprays to both nostrils q.12h.   9.   Lisinopril 2.5 mg daily.   10.  Metformin 1000 mg p.o. b.i.d.   11.  Metoprolol XL 12.5 mg daily.   12.  Omeprazole 40 mg daily.   13.  Metamucil 1 packet daily.   14.  Flomax 0.4 mg daily.      FAMILY HISTORY:  There is a maternal history of breast cancer and paternal history of heart failure.      SOCIAL HISTORY:  The patient is .  He is accompanied by his current wife.  He is a former tobacco user.  HE quit in 1999.  He is a retired .      REVIEW OF SYSTEMS:  A 10-point review of systems was done and is negative except as in the history of present illness.  This was obtained from both the patient and the patient's wife, given that the patient is having some confusion.        PHYSICAL EXAMINATION:   GENERAL:  Elderly male who appears younger than his stated age.   VITAL SIGNS:  Blood pressure is 144/104, pulse 76, respiratory rate 16 and temperature 97.7.   HEENT:  Pupils are equal.  Extraocular movements are intact.  Pharynx is without erythema.   NECK:  Supple.   CHEST:  Clear to auscultation.   CARDIOVASCULAR:  Regular rate and rhythm.  Normal S1 and S2.   ABDOMEN:  Positive bowel sounds.  Soft and nontender.   EXTREMITIES:  No edema.  DP pulses are not palpated.   SKIN:  The patient has dry skin on his feet bilaterally.  He has onychomycosis of his toenails.   NEUROLOGIC:  The patient appears confused.  He has trouble following directions.  Therefore, a full neuro exam is not achievable given his confusion, but he also has macular degeneration.  Cranial nerves II-XII appear grossly intact.  Lower extremity strength appears equal bilaterally.  Sensation is intact.  The patient is unable to cooperate with strength testing on his upper extremities due to difficulty following directions.  He also cannot do finger-nose-finger accurately bilaterally.  This possibly could be due to the fact that the patient has trouble seeing due to his macular degeneration.      LABORATORY DATA:  His sodium  is 137, potassium 5.4, chloride 102, bicarbonate 28, BUN 19, creatinine 0.73, calcium 9.5 and glucose 222.  White count is 9.6, hemoglobin 13.7 and platelet 160.  His last lipids were from 12/01/2017 when he had a cholesterol of 142, HDL of 66, LDL of 39 and triglycerides of 186.  His last A1c was 8.9 on 12/14/2017.  Of note, he did have a potassium of 5.9 on 12/01/2017.        IMAGING:  Head CT showed a large chronic infarct in the anterolateral aspect of the left frontal lobe.  There appeared to be no other perfusion defects.  CT angiogram of the head on 12/31/2017 showed chronic occlusion of the left internal carotid artery from the origin to the terminus again noted without change, moderate atherosclerotic narrowing of the distal P2 segment of the left posterior cerebral artery, again noted without change, atherosclerotic plaque of the origin of the right internal carotid artery, resulting in less than 50% luminal diameter stenosis again noted and no evidence for intracranial cerebral artery occlusion to explain the patient's recent symptoms.  Overall, no change from comparison study.      EKG shows sinus rhythm with a right bundle branch block.      ASSESSMENT:  Dustin Pearce is an 89-year-old male with a history of coronary artery disease, hypertension, type 2 diabetes, dyslipidemia, macular degeneration, history of stroke, hypertension and chronically occluded left carotid artery who presents with changes of slurred speech and confusion.  He was last normal last night before bed.  He is outside the window for TPA.  According to the wife, the patient is better.  In the Emergency Room, his speech is better, although he is still having some confusion and difficulty following directions.  The patient was able to stand and use the urinal.  The patient was already seen by Neurology.  The patient is going to get a rectal aspirin.   1.  Stroke.  The patient did have a CT of his head with and without contrast as well  as a CTA.  This did not show any acute change.  The patient will get an MRI and will also get an echocardiogram.  He will be seen by PT, OT and Speech.  We will continue a rectal aspirin until he is able to pass a swallowing study.  The patient just had lipids done earlier this month, which showed an LDL that this is well controlled at 39.   2.  Hypertension.  The patient will have to be held on his current medications, and he will get permissive hypertension per the Neurology stroke orders.   3.  Persistent hyperkalemia.  The patient had a potassium of 5.9 on 2017.  It now is 5.4.  I would like to probably hold his ACE inhibitor and recheck this in the morning.  Once the patient is able to take p.o., the patient can get some Kayexalate, but at this point we will just monitor this.   4.  History of type 2 diabetes under poor control with an A1c of 8.9.  The patient will be on a medium sliding scale.  We will also start him on 8 units of Lantus as we are going to hold his metformin and glipizide.   5.  History of hyperlipidemia.  We will restart his statin when he is able to take p.o.   6.  History of chronic left carotid artery stenosis.   7.  History of mild cognitive impairment.   8.  History of paroxysmal atrial fibrillation.  The patient is not on anticoagulation.   9.  Code status was discussed with the patient's wife, and he is full code.   10.  Deep venous thrombosis prophylaxis.  The patient will be on SCDs.      DISPOSITION:  The patient will be admitted under inpatient status.         EUNICE JARRETT MD             D: 2017 13:53   T: 2017 18:09   MT: [id number]      Name:     SID AGUIAR   MRN:      [id number]        Account:      [id number]   :      1928           Admitted:     [id number]      Document: [id number]       cc: Matt Samuel MD

## 2018-01-01 NOTE — PLAN OF CARE
Problem: Patient Care Overview  Goal: Plan of Care/Patient Progress Review  OT: Orders received, eval completed, treatment initiated. Pt is an 89 yo male with dementia admitted for confusion, undergoing workup for CVA. Pt has macular degeneration and reports no vision in L eye, pt has history of CVA and cardiac issues, toe amputation, please see chart for full H & P. Pt lives with his SO in a townhouse with 2 stairs with 1 railing to enter, split level with 7stairs up and down with B railings. All needs met on upper level. Pt IND in ADLs at baseline, uses SEC and/or 2WW for community distances, does not typically use in the home, SO assists with guiding on stairs due to vision loss. SO manages medications and finances, she works full time, and has neighbors who check on pt during the day.     Discharge Planner OT   Patient plan for discharge: None specifically stated, appears to prefer home  Current status: Pt completed bed mobility with SBA and VC, sit to stand with CGA, pt ambulated with MIN A without AD, recommend use of 2WW and CGA, pt had several LOB which required CGA-MOD A to correct. Pt has mild impairment following cues, forgets direction quickly. Pt shows some impulsivity and confusion. Appears to be attending to L side within baseline of vision loss.   Barriers to return to prior living situation: Cognition, level of assist/supervision for functional mobility, stairs (defer to PT), increased risk of falls  Recommendations for discharge: TCU, pending progress pt may be able to return home with increased supervision and assist for mobility and ADLs with home OT.   Rationale for recommendations: Pt is below baseline for IND In ADLs, pt would benefit from skilled services to maximize safety and IND in ADLs and functional mobility.        Entered by: Linda Atkins 01/01/2018 9:40 AM

## 2018-01-01 NOTE — PLAN OF CARE
Problem: Patient Care Overview  Goal: Plan of Care/Patient Progress Review  PT: Orders received, evaluation completed, and treatment initiated. Patient is a 89 y/o male admitted with confusion, undergoing CVA workup. Patient lives with his SO in a Townhouse with 2 steps to enter/exit and 7 stairs to access bedroom. At baseline, patient completes functional mobility independently with use of a cane in the house and occasional use of a walker. Patient's SO provided subjective history as patient had difficulty answering questions.     Discharge Planner PT   Patient plan for discharge: Per chart, patient's family agreeable to TCU    Current status: Pt performed bed mobility, SBA. Sit <> stand with FWW and CGA, ambulation of 150 feet with FWW and Oni. Patient agreeable to sit up in chair at end of session.     Barriers to return to prior living situation: Level of assist, Fall risk, Stairs-not yet assessed    Recommendations for discharge: TCU    Rationale for recommendations: Pt would benefit from TCU prior to returning home with SO in order to increase independence and safety with functional mobility         Entered by: Marian Corrigan 01/01/2018 4:38 PM

## 2018-01-01 NOTE — PROGRESS NOTES
"SPIRITUAL HEALTH SERVICES Progress Note  [facility]    Visited pt per admin request. Pt requested prayer for discernment over whether or not he should decide to get a stent during this hospitalization. Pt's wife described his condition as a \"ticking time-bomb,\" saying it was only a matter of time before he had a large stroke unless he got the stent. Pt's wife stated that her mother  of the same condition, having had many TIA's before having several large strokes that eventually took her life. Pt stated that this felt like a big decision. Pt and wife would appreciate seeing a , so SH will put in a request for Father Filipe. SH prayed with pt. SH is available should any additional needs arise.      Linda Lawrence  Chaplain Resident  Pager: [phone]  Office: [phone]  "

## 2018-01-01 NOTE — PROGRESS NOTES
[facility]    Hospitalist Progress Note    Assessment & Plan   Dustin Pearce is a 89 year old male who was admitted on 12/31/2017.      Dustin Pearce is an 89-year-old male with a history of coronary artery disease, hypertension, type 2 diabetes, dyslipidemia, macular degeneration, history of stroke, hypertension and chronically occluded left carotid artery who presents with changes of slurred speech and confusion.  He was last normal last night before bed.  He is outside the window for TPA.  According to the wife, the patient is better.  In the Emergency Room, his speech is better, although he is still having some confusion and difficulty following directions.  The patient was able to stand and use the urinal.  The patient was already seen by Neurology.  The patient is going to get a rectal aspirin.   1.  TIA  12/31/17CT of his head with and without contrast as well as a CTA.  This did not show any acute change.   -MRI no evidence of acute intracranial pathology, no change from prior study  CT and MRI showed a large chronic infarct anterolateral aspect of left frontal lobe  -much improved today and is very jolly, which is back to his usual personality  -await final recommendations of PT/OT speech  -continue the asa and plavix    2.  Hypertension.  The patient will have to be held on his current medications, and he will get permissive hypertension per the Neurology stroke orders.   -can restart his usual BP meds on discharge    3.  Persistent hyperkalemia.  The patient had a potassium of 5.9 on 12/01/2017.  It now is 5.4.   -his creatinine has normalized off the ACE  -recommend that he not be on his ace or arb when he is discharged     4.  History of type 2 diabetes under poor control with an A1c of 8.9.  The patient will be on a medium sliding scale.  We will also start him on 8 units of Lantus as we are going to hold his metformin and glipizide.   -continue current diabetes management     5.   History of hyperlipidemia. restart his statin  6.  History of chronic left carotid artery stenosis.   7.  History of mild cognitive impairment.   8.  History of paroxysmal atrial fibrillation.  The patient is not on anticoagulation.   9.  Code status was discussed with the patient's wife, and he is full code.   10.  Deep venous thrombosis prophylaxis.  The patient will be on SCDs.       DISPOSITION:  The patient will be admitted under inpatient status.           Meseret Tidwell MD    Interval History   Much improved today.  Discussed with son and daughter who are present in room also    -Data reviewed today: I reviewed all new labs and imaging results over the last 24 hours. I personally reviewed the brain MRI image(s) showing no acute intracranial pathology.    Physical Exam   Temp: 97.8  F (36.6  C) Temp src: Oral BP: 146/71 Pulse: 76 Heart Rate: 67 Resp: 16 SpO2: 96 % O2 Device: None (Room air)    Vitals:    12/31/17 1333 01/01/18 0616   Weight: 74.7 kg (164 lb 11.2 oz) 73.5 kg (162 lb)     Vital Signs with Ranges  Temp:  [97.3  F (36.3  C)-98.2  F (36.8  C)] 97.8  F (36.6  C)  Pulse:  [76] 76  Heart Rate:  [67-76] 67  Resp:  [14-25] 16  BP: (117-172)/() 146/71  SpO2:  [93 %-97 %] 96 %  I/O last 3 completed shifts:  In: 975 [P.O.:240; I.V.:735]  Out: 875 [Urine:875]    Constitutional: alert   Respiratory: clear to auscultation, no wheezing or rhonchi   Cardiovascular: regular rate and rhythm without murmer or rub   GI:positive bowel sounds, non-tender   Skin/Integumen: no rashes    Other:        Medications     - MEDICATION INSTRUCTIONS -       - MEDICATION INSTRUCTIONS -       NaCl 100 mL/hr at 12/31/17 2014       sodium chloride (PF)  10 mL Intravenous Once     ipratropium  2 spray Both Nostrils Q12H     sodium chloride (PF)  3 mL Intracatheter Q8H     aspirin EC  325 mg Oral Daily    Or     aspirin  300 mg Rectal Daily     insulin glargine  5 Units Subcutaneous Daily     insulin aspart  1-7 Units  Subcutaneous TID AC     insulin aspart  1-5 Units Subcutaneous At Bedtime     atorvastatin (LIPITOR) tablet 80 mg  80 mg Oral Daily     clopidogrel (PLAVIX) tablet 75 mg  75 mg Oral Daily     DULoxetine  30 mg Oral Daily     metoprolol  12.5 mg Oral Daily     tamsulosin  0.4 mg Oral Daily       Data     Recent Labs  Lab 01/01/18  0815 12/31/17  2148 12/31/17  1425 12/31/17  1130   WBC 5.3  --   --  9.6   HGB 12.1*  --   --  13.7   MCV 93  --   --  94   *  --   --  160   INR  --   --   --  0.99     --   --  137   POTASSIUM 4.3  --  4.4 5.4*   CHLORIDE 105  --   --  102   CO2 26  --   --  28   BUN 13  --   --  19   CR 0.66  --   --  0.73   ANIONGAP 6  --   --  7   ALLISON 8.2*  --   --  9.5   *  --  193* 222*   TROPI  --  0.033 0.024  --        Recent Results (from the past 24 hour(s))   CT Head w/o Contrast    Narrative    CT OF THE HEAD WITHOUT CONTRAST 12/31/2017 11:39 AM     COMPARISON: Brain MR 1/20/2017.    HISTORY: Code Stroke; left-sided weakness.    TECHNIQUE: 5 mm thick axial CT images of the head were acquired  without IV contrast material.    FINDINGS: A moderate-sized chronic left frontal infarct is again noted  without change. There is moderate diffuse cerebral volume loss. There  are subtle patchy areas of decreased density in the cerebral white  matter bilaterally that are consistent with sequela of chronic small  vessel ischemic disease.    The ventricles and basal cisterns are within normal limits in  configuration given the degree of cerebral volume loss.  There is no  midline shift. There are no extra-axial fluid collections.     No intracranial hemorrhage, mass or recent infarct.    The visualized paranasal sinuses are well-aerated. There is no  mastoiditis. There are no fractures of the visualized bones.       Impression    IMPRESSION: Moderate-sized chronic left frontal infarct again noted  without change. Diffuse cerebral volume loss and cerebral white matter  changes consistent  with chronic small vessel ischemic disease. No  evidence for acute intracranial pathology.       Radiation dose for this scan was reduced using automated exposure  control, adjustment of the mA and/or kV according to patient size, or  iterative reconstruction technique.    LISANDRO MAGALLON MD   CTA Angiogram Head Neck    Narrative    CT ANGIOGRAM OF THE HEAD AND NECK WITHOUT AND WITH CONTRAST   12/31/2017 11:52 AM     COMPARISON: None.    HISTORY: Code Stroke. Left-sided weakness.    TECHNIQUE:  Precontrast localizing scans were followed by CT  angiography with an injection of 70 mL Isovue-370 nonionic intravenous  contrast material with scans through the head and neck.  Images were  transferred to a separate 3-D workstation where multiplanar  reformations and 3-D images were created.  Estimates of carotid  stenoses are made relative to the distal internal carotid artery  diameters except as noted.      FINDINGS:   Neck CTA: The common carotid arteries bilaterally remain patent  without stenosis. Chronic occlusion of the left internal carotid  artery beginning at the origin again noted. Atherosclerotic plaque  resulting in less than 50% luminal diameter stenosis of the origin of  the right internal carotid artery again noted. The cervical internal  carotid artery is tortuous but is otherwise patent with no additional  areas of narrowing. There is mild atherosclerotic narrowing at the  origin of the left vertebral artery. The right vertebral artery is  patent without stenosis.    Head CTA: There is calcified atherosclerotic plaque of the  intracranial distal internal carotid artery on the right that does not  result in stenosis. The left internal carotid artery is occluded.  There is moderate atherosclerotic narrowing at the distal P2 segment  of the left posterior cerebral artery that is unchanged. The basilar  artery is patent without stenosis. The bilateral anterior cerebral,  bilateral middle cerebral and right  posterior cerebral arteries are  patent without stenosis. The anterior communicating and bilateral  posterior communicating arteries remain patent.      Impression    IMPRESSION:  1. 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  2. Moderate atherosclerotic narrowing of the distal P2 segment of the  left posterior cerebral artery again noted without change.  3. Atherosclerotic plaque at the origin of the right internal carotid  artery resulting in less than 50% luminal diameter stenosis again  noted.  4. No evidence for intracranial cerebral artery occlusion to explain  the patient's recent symptoms.  5. Overall, no change from the comparison study.      Radiation dose for this scan was reduced using automated exposure  control, adjustment of the mA and/or kV according to patient size, or  iterative reconstruction technique.    LISANDRO MAGALLON MD   CT Head w Contrast    Narrative    CT BRAIN PERFUSION 12/31/2017 11:56 AM    COMPARISON: None.    HISTORY:  Code Stroke; left-sided weakness.    TECHNIQUE: Time sequential axial CT images of the head were acquired  during the administration of intravenous contrast (50 mL Isovue-370).  CTA images of the [place] as well as color perfusion maps of  the brain were created from this time sequential axial source data.      Impression    IMPRESSION: There is a large chronic infarct at the anterolateral  aspect of the left frontal lobe. There are no other perfusion defects.    Radiation dose for this scan was reduced using automated exposure  control, adjustment of the mA and/or kV according to patient size, or  iterative reconstruction technique.    LISANDRO MAGALLON MD   MR Brain w/o & w Contrast    Narrative    MRI OF THE BRAIN WITHOUT AND WITH CONTRAST January 1, 2018 6:13 AM     HISTORY: New onset confusion this morning, also slurred speech,  history of stroke.     TECHNIQUE: Axial diffusion-weighted  with ADC map, axial T2-weighted  with fat saturation, axial T1-weighted, axial turboFLAIR and coronal  T1-weighted images of the brain were acquired without intravenous  contrast. Following intravenous administration of gadolinium (7 mL  Gadavist), axial T1-weighted images of the brain were acquired.     COMPARISON: Head CT 12/31/2017.    FINDINGS: There is moderate diffuse cerebral volume loss. There are  patchy periventricular areas of abnormal T2 signal hyperintensity in  the cerebral white matter bilaterally that are consistent with sequela  of chronic small vessel ischemic disease. A moderate sized chronic  left frontal infarct is again noted without change.    The ventricles and basal cisterns are within normal limits in  configuration given the degree of cerebral volume loss. There is no  midline shift. There are no extra-axial fluid collections. There is no  evidence for stroke or acute intracranial hemorrhage. There is no  abnormal contrast enhancement in the brain or its coverings.     There is no sinusitis or mastoiditis.      Impression    IMPRESSION: Diffuse cerebral volume loss and cerebral white matter  changes consistent with chronic small vessel ischemic disease.  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 No evidence for acute intracranial pathology. No change from  the comparison study.

## 2018-01-01 NOTE — PROGRESS NOTES
01/01/18 1433   Quick Adds   Type of Visit Initial PT Evaluation   Living Environment   Lives With significant other  ([place])   Living Arrangements house  ([facility])   Number of Stairs to Enter Home 2  (1 railing. )   Number of Stairs Within Home 7  (1 railing)   Transportation Available family or friend will provide   Living Environment Comment All needs are met on the main level.    Self-Care   Usual Activity Tolerance good   Current Activity Tolerance moderate   Regular Exercise yes   Activity/Exercise Type strength training   Equipment Currently Used at Home cane, straight;walker, rolling   Functional Level Prior   Ambulation 1-->assistive equipment   Transferring 1-->assistive equipment   Fall history within last six months yes   Number of times patient has fallen within last six months 4   Prior Functional Level Comment Per pt's SO, patient completes functional mobility with use of SEC in the house with occasional use of FWW.    General Information   Onset of Illness/Injury or Date of Surgery - Date 12/31/17   Referring Physician Meseret Tidwell MD   Patient/Family Goals Statement None stated.    Pertinent History of Current Problem (include personal factors and/or comorbidities that impact the POC) 88 y/o male admitted with confusion, currently undergoing CVA workup. PMH including L CVA (1999), L ICA occlusion, TIA, ischemia cardiomyopathy, hyperlipidemia, DM 2, HTN.    Precautions/Limitations fall precautions   General Observations Pt in supine upon arrival of therapist.    General Info Comments Ambulate with assist.    Cognitive Status Examination   Orientation orientation to person, place and time   Level of Consciousness alert   Follows Commands and Answers Questions 75% of the time;unable to follow multi-step instructions   Personal Safety and Judgment impaired   Pain Assessment   Patient Currently in Pain No   Integumentary/Edema   Integumentary/Edema Comments No deficits noted.    Posture   "  Posture Comments Noted forward head and shoulder posture upon standing at FWW.    Range of Motion (ROM)   ROM Comment B LEs WFL.    Strength   Strength Comments L LE grossly 4/5, R LE grossly 3+/5. Pt reported R LE weakness after CVA in 1999.    Bed Mobility   Bed Mobility Comments Supine-sit, SBA.    Transfer Skills   Transfer Comments Sit <> stand with FWW and CGA.    Gait   Gait Comments Pt amb 150' with FWW and Oni.    Balance   Balance Comments Noted good sitting balance and fair standing balance at FWW.    Sensory Examination   Sensory Perception Comments Pt denies numbness/tingling in B LEs and UEs.    General Therapy Interventions   Planned Therapy Interventions balance training;bed mobility training;gait training;neuromuscular re-education;ROM;strengthening;transfer training   Clinical Impression   Criteria for Skilled Therapeutic Intervention yes, treatment indicated   PT Diagnosis Difficulty with gait   Influenced by the following impairments Decreased activity tolerance, Generalized weakness, Impaired balance   Functional limitations due to impairments Limited functional mobility requiring AD and assist.    Clinical Presentation Stable/Uncomplicated   Clinical Presentation Rationale Based on PMH, current presentation, and social support   Clinical Decision Making (Complexity) Low complexity   Therapy Frequency` daily   Predicted Duration of Therapy Intervention (days/wks) 4 days   Anticipated Discharge Disposition Transitional Care Facility   Risk & Benefits of therapy have been explained Yes   Patient, Family & other staff in agreement with plan of care Yes   [facility] AM-PAC  \"6 Clicks\" V.2 Basic Mobility Inpatient Short Form   1. Turning from your back to your side while in a flat bed without using bedrails? 4 - None   2. Moving from lying on your back to sitting on the side of a flat bed without using bedrails? 3 - A Little   3. Moving to and from a bed to a chair (including a wheelchair)? " 3 - A Little   4. Standing up from a chair using your arms (e.g., wheelchair, or bedside chair)? 3 - A Little   5. To walk in hospital room? 3 - A Little   6. Climbing 3-5 steps with a railing? 2 - A Lot   Basic Mobility Raw Score (Score out of 24.Lower scores equate to lower levels of function) 18   Total Evaluation Time   Total Evaluation Time (Minutes) 8

## 2018-01-02 ENCOUNTER — APPOINTMENT (OUTPATIENT)
Dept: SPEECH THERAPY | Facility: CLINIC | Age: 83
DRG: 038 | End: 2018-01-02
Attending: INTERNAL MEDICINE
Payer: MEDICARE

## 2018-01-02 ENCOUNTER — APPOINTMENT (OUTPATIENT)
Dept: ULTRASOUND IMAGING | Facility: CLINIC | Age: 83
DRG: 038 | End: 2018-01-02
Attending: SURGERY
Payer: MEDICARE

## 2018-01-02 ENCOUNTER — APPOINTMENT (OUTPATIENT)
Dept: PHYSICAL THERAPY | Facility: CLINIC | Age: 83
DRG: 038 | End: 2018-01-02
Payer: MEDICARE

## 2018-01-02 ENCOUNTER — APPOINTMENT (OUTPATIENT)
Dept: OCCUPATIONAL THERAPY | Facility: CLINIC | Age: 83
DRG: 038 | End: 2018-01-02
Payer: MEDICARE

## 2018-01-02 LAB
DEPRECATED CALCIDIOL+CALCIFEROL SERPL-MC: 13 UG/L (ref 20–75)
GLUCOSE BLDC GLUCOMTR-MCNC: 176 MG/DL (ref 70–99)
GLUCOSE BLDC GLUCOMTR-MCNC: 209 MG/DL (ref 70–99)
GLUCOSE BLDC GLUCOMTR-MCNC: 268 MG/DL (ref 70–99)
GLUCOSE BLDC GLUCOMTR-MCNC: 270 MG/DL (ref 70–99)

## 2018-01-02 PROCEDURE — 93882 EXTRACRANIAL UNI/LTD STUDY: CPT | Mod: RT

## 2018-01-02 PROCEDURE — 40000193 ZZH STATISTIC PT WARD VISIT

## 2018-01-02 PROCEDURE — 25000132 ZZH RX MED GY IP 250 OP 250 PS 637: Mod: GY | Performed by: INTERNAL MEDICINE

## 2018-01-02 PROCEDURE — 40000225 ZZH STATISTIC SLP WARD VISIT: Performed by: SPEECH-LANGUAGE PATHOLOGIST

## 2018-01-02 PROCEDURE — 97116 GAIT TRAINING THERAPY: CPT | Mod: GP

## 2018-01-02 PROCEDURE — 92526 ORAL FUNCTION THERAPY: CPT | Mod: GN | Performed by: SPEECH-LANGUAGE PATHOLOGIST

## 2018-01-02 PROCEDURE — 99232 SBSQ HOSP IP/OBS MODERATE 35: CPT | Performed by: NURSE PRACTITIONER

## 2018-01-02 PROCEDURE — 99232 SBSQ HOSP IP/OBS MODERATE 35: CPT | Performed by: INTERNAL MEDICINE

## 2018-01-02 PROCEDURE — 12000000 ZZH R&B MED SURG/OB

## 2018-01-02 PROCEDURE — A9270 NON-COVERED ITEM OR SERVICE: HCPCS | Mod: GY | Performed by: INTERNAL MEDICINE

## 2018-01-02 PROCEDURE — 40000133 ZZH STATISTIC OT WARD VISIT: Performed by: OCCUPATIONAL THERAPY ASSISTANT

## 2018-01-02 PROCEDURE — 25000131 ZZH RX MED GY IP 250 OP 636 PS 637: Mod: GY | Performed by: INTERNAL MEDICINE

## 2018-01-02 PROCEDURE — 00000146 ZZHCL STATISTIC GLUCOSE BY METER IP

## 2018-01-02 PROCEDURE — 97535 SELF CARE MNGMENT TRAINING: CPT | Mod: GO | Performed by: OCCUPATIONAL THERAPY ASSISTANT

## 2018-01-02 PROCEDURE — 99221 1ST HOSP IP/OBS SF/LOW 40: CPT | Mod: 57 | Performed by: SURGERY

## 2018-01-02 RX ADMIN — ATORVASTATIN CALCIUM 80 MG: 80 TABLET, FILM COATED ORAL at 08:49

## 2018-01-02 RX ADMIN — IPRATROPIUM BROMIDE 2 SPRAY: 21 SPRAY NASAL at 19:03

## 2018-01-02 RX ADMIN — DULOXETINE 30 MG: 30 CAPSULE, DELAYED RELEASE ORAL at 08:49

## 2018-01-02 RX ADMIN — IPRATROPIUM BROMIDE 2 SPRAY: 21 SPRAY NASAL at 07:34

## 2018-01-02 RX ADMIN — INSULIN GLARGINE 3 UNITS: 100 INJECTION, SOLUTION SUBCUTANEOUS at 13:11

## 2018-01-02 RX ADMIN — INSULIN ASPART 3 UNITS: 100 INJECTION, SOLUTION INTRAVENOUS; SUBCUTANEOUS at 18:06

## 2018-01-02 RX ADMIN — Medication 12.5 MG: at 08:48

## 2018-01-02 RX ADMIN — TAMSULOSIN HYDROCHLORIDE 0.4 MG: 0.4 CAPSULE ORAL at 08:48

## 2018-01-02 RX ADMIN — INSULIN GLARGINE 5 UNITS: 100 INJECTION, SOLUTION SUBCUTANEOUS at 08:48

## 2018-01-02 RX ADMIN — CLOPIDOGREL 75 MG: 75 TABLET, FILM COATED ORAL at 08:49

## 2018-01-02 RX ADMIN — ASPIRIN 325 MG: 325 TABLET, DELAYED RELEASE ORAL at 08:49

## 2018-01-02 RX ADMIN — INSULIN ASPART 2 UNITS: 100 INJECTION, SOLUTION INTRAVENOUS; SUBCUTANEOUS at 13:11

## 2018-01-02 ASSESSMENT — ACTIVITIES OF DAILY LIVING (ADL)
ADLS_ACUITY_SCORE: 15

## 2018-01-02 ASSESSMENT — VISUAL ACUITY
OU: GLASSES
OU: GLASSES
OU: GLASSES;OTHER (SEE COMMENT)
OU: GLASSES
OU: GLASSES
OU: GLASSES;OTHER (SEE COMMENT)

## 2018-01-02 NOTE — PROGRESS NOTES
[facility]  Neurology Daily Note      Admission Date:12/31/2017   Date of service: 01/02/2018   Hospital Day: 3      Assessment & Plan   _______________________________  #. (G45.1) TIA involving right internal carotid artery  (primary encounter diagnosis)  --complete resolution of symptoms  --not tPA candidate due to being outside of the treatment window on admission  --not IR candidate due to lack of large vessel occlusion  ----MRI negative for acute abnormality  --RISK FACTORS: carotid stenosis, HTN, DM II  --on aspirin 81 mg and plavix 75 mg PO daily  --lipids normal on atorvastatin  #. (I65.21) Carotid stenosis, symptomatic w/o infarct, right  --consulted vascular surgery/IR for evaluation for stenting vs CEA  --carotid US with 50-69% stenosis   -----concern for ulceration, in further discussion with vascular surgery, they discussed CEA with patient  #. (I10) Essential hypertension  --management per primary service  #. (E11.69) Type 2 diabetes mellitus with other specified complication, without long-term current use of insulin (H)  --A1c 8.6  --management per primary service  #. PT/OT/Speech  --continue evaluations  #. Nutrition  --Per speech therapy evaluation   #. DVT Prophylaxis  --Mechanical    Code Status: Full Code    Disposition: pending    Interval History   _______________________________  Patient presented for left sided weakness and neglect. CT with chronic left frontal stroke, no acute abnormality, CTA with chronic left ICA occlusion and moderate right ICA stenosis. MRI without acute abnormality. Patient essentially returned to his baseline.  Today still without recurrent symptoms. Vascular surgery to evaluate.    Review of Systems   _______________________________  The Review of Systems is negative other than noted in the HPI  Physical Exam   _______________________________  Vitals: Temp: 97.2  F (36.2  C) Temp src: Oral BP: 135/75   Heart Rate: 78 Resp: 16 SpO2: 90 % O2 Device:  None (Room air)    Vital Signs with Ranges: Temp:  [97.2  F (36.2  C)-98.5  F (36.9  C)] 97.2  F (36.2  C)  Heart Rate:  [62-78] 78  Resp:  [16] 16  BP: (113-135)/(52-75) 135/75  SpO2:  [90 %-95 %] 90 %    General Appearance:  No acute distress  Neuro:       Mental Status Exam:   Awake, alert, oriented X3. Speech and language are intact. Mental status is normal       Cranial Nerves:  Pupils 3 mm, reactive. EOMI. Face sensation is normal. Face is symmetric. Tongue and uvula are midline. Other CN are normal           Motor:  5/5 X 4. Tone and bulk are normal           Reflexes:  Normal DTR. Toes downgoing.        Sensory:  Normal to light touch               Coordination:   Intact finger-to-nose        Gait:  No significant difficulties  Cardiovascular: Regular rate and rhythm, no m/r/g  Lungs: Clear to auscultation  Abdomen: Soft, not tender, not distended  Extremities: No clubbing, no cyanosis, no edema    Medications   _______________________________    - MEDICATION INSTRUCTIONS -       - MEDICATION INSTRUCTIONS -         sodium chloride (PF)  10 mL Intravenous Once     ipratropium  2 spray Both Nostrils Q12H     sodium chloride (PF)  3 mL Intracatheter Q8H     aspirin EC  325 mg Oral Daily    Or     aspirin  300 mg Rectal Daily     insulin glargine  5 Units Subcutaneous Daily     insulin aspart  1-7 Units Subcutaneous TID AC     insulin aspart  1-5 Units Subcutaneous At Bedtime     atorvastatin (LIPITOR) tablet 80 mg  80 mg Oral Daily     clopidogrel (PLAVIX) tablet 75 mg  75 mg Oral Daily     DULoxetine  30 mg Oral Daily     metoprolol  12.5 mg Oral Daily     tamsulosin  0.4 mg Oral Daily       Data   _______________________________      Lab Data:   All data was reviewed by me personally  CBC RESULTS:  Recent Labs   Lab Test  01/01/18   0815  12/31/17   1130  09/28/17   1649   06/09/17   0535   WBC  5.3  9.6   --    --   7.9   RBC  3.93*  4.43   --    --   3.53*   HGB  12.1*  13.7  12.7*   < >  10.8*   HCT  36.5*   41.8   --    --   32.8*   PLT  126*  160   --    --   214    < > = values in this interval not displayed.     Basic Metabolic Panel:  Recent Labs   Lab Test  01/01/18   0815  12/31/17   1425 12/31/17   1130  12/01/17   0928   NA  137   --   137  139   POTASSIUM  4.3  4.4  5.4*  5.9*   CHLORIDE  105   --   102  104   CO2  26   --   28  30   BUN  13   --   19  17   CR  0.66   --   0.73  0.83   GLC  156*  193*  222*  256*   ALLISON  8.2*   --   9.5  10.6*     Liver panel:  Recent Labs   Lab Test  12/27/16   0650  12/25/16   0002  10/20/16   1825   PROTTOTAL  6.5*  7.1  7.3   ALBUMIN  2.8*  3.7  3.7   BILITOTAL  0.7  0.6  0.4   ALKPHOS  90  58  68   AST  43  19  17   ALT  50  21  26     Coagulation  Recent Labs   Lab Test  12/31/17   1130  01/20/17   1030   INR  0.99  1.03   PTT  32  29      Lipid Profile:  Recent Labs   Lab Test  12/31/17   1425 12/01/17   0928  12/18/15 12/05/14   CHOL  133  142   < >  198  160   HDL  43  66   < >  39*  48   LDL  61  39   < >  123  82   TRIG  147  186*   < >  181*  149   CHOLHDLRATIO   --    --    --   5.1*  3.3    < > = values in this interval not displayed.     Thyroid Panel:  Recent Labs   Lab Test  12/31/17 1425 01/20/17   1030  10/20/16   1825   TSH  2.16  3.12  4.63*   T4   --    --   0.94      Vitamin B12:   Recent Labs   Lab Test  01/20/17   1030   B12  298      Vitamin D level:   Recent Labs   Lab Test  01/20/17   1030   VITDT  19*     A1C:   Recent Labs   Lab Test  12/31/17   1425 12/14/17   0821  12/01/17   0928  07/24/17   1608  04/21/17   1543   A1C  8.6*  8.9*  8.7*  6.7*  8.3*     Troponin I:   Recent Labs   Lab Test  12/31/17 2148 12/31/17 1425 01/20/17   2235  01/20/17   1030  12/25/16   0002   TROPI  0.033  0.024  0.030  0.018  0.022     CRP inflammation:   Recent Labs   Lab Test  12/31/17   1425  06/09/17   0535  06/08/17   0613  06/07/17   0640  05/31/17   1800   CRP  <2.9  89.0*  93.4*  107.0*  21.2*     ESR:   Recent Labs   Lab Test  05/31/17   1800    SED  43*       UA Results:  Recent Labs   Lab Test  01/20/17   1130   COLOR  Light Yellow   APPEARANCE  Clear   URINEGLC  Negative   URINEBILI  Negative   URINEKETONE  Negative   SG  1.040*   UBLD  Negative   URINEPH  6.5   PROTEIN  Negative   NITRITE  Negative   LEUKEST  Negative   RBCU  0   WBCU  1        Cardiac US:   The left ventricle is normal in size. There is mild to moderate concentric left ventricular hypertrophy. Left ventricular systolic function is moderately reduced. The visual ejection fraction is estimated at 35-40%. Grade I or early diastolic dysfunction. There is severe hypokinesis to akinesis of the entire inferior and mid to basal inferolateral walls. There is no thrombus seen in the left ventricle. The right ventricle is normal size. The right ventricular systolic function is normal. Trace to mild mitral and tricuspid regurgitation. There is moderate trileaflet aortic sclerosis. No aortic regurgitation is present. Transaortic gradients are mildly increased consistent with aortic sclerosis. The peak AoV pressure gradient is 15.2 mmHg. The mean AoV pressure gradient is 7.9 mmHg. Mild aortic root dilatation. No pericardial effusion. In comparison to the previous report dated 01/21/2017, the findings are similar.       Neurophysiology:   --       Imaging:   All imaging studies were reviewed personally  CT head 12/31/17:   --Moderate-sized chronic left frontal infarct again noted without change. Diffuse cerebral volume loss and cerebral white matter changes consistent with chronic small vessel ischemic disease. No evidence for acute intracranial pathology.     CTA head/neck 12/31/17:   1. Chronic occlusion of the left internal carotid artery from the origin to the terminus again noted without change.  2. Moderate atherosclerotic narrowing of the distal P2 segment of the left posterior cerebral artery again noted without change.  3. Atherosclerotic plaque at the origin of the right internal carotid  artery resulting in less than 50% luminal diameter stenosis again noted.  4. No evidence for intracranial cerebral artery occlusion to explain the patient's recent symptoms.  5. Overall, no change from the comparison study.    CTP head 12/31/17:  --There is a large chronic infarct at the anterolateral aspect of the left frontal lobe. There are no other perfusion defects.    MRI brain 1/1/18:   --Diffuse cerebral volume loss and cerebral white matter changes consistent with chronic small vessel ischemic disease. 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 No evidence for acute intracranial pathology. No change from the comparison study.    Carotid US 1/2/18:  --50-69% diameter stenosis of the right ICA relative to the distal ICA diameter.      Text Page    Anna Velasquez, MSN, FNP-BC, RN CNRN

## 2018-01-02 NOTE — CONSULTS
[facility] Interventional Neuroradiology:  Remote Consultation Note ~    We were asked to review imaging and provide consultation for possible carotid stenting.    HPI:  This is an 89 year old male admitted with stroke symptoms on 12/31/17.  Noninvasive imaging obtained.    Imaging:    MRI brain yesterday ~  Diffuse cerebral volume loss and cerebral white matter  changes consistent with chronic small vessel ischemic disease.  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 No evidence for acute intracranial pathology. No change from  the comparison study.    CTA head and neck on 12/31/17 ~  1. 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  2. Moderate atherosclerotic narrowing of the distal P2 segment of the  left posterior cerebral artery again noted without change.  3. Atherosclerotic plaque at the origin of the right internal carotid  artery resulting in less than 50% luminal diameter stenosis again  noted.  4. No evidence for intracranial cerebral artery occlusion to explain  the patient's recent symptoms.  5. Overall, no change from the comparison study.    Carotid ultrasound today ~  50-69% diameter stenosis of the right ICA relative to the distal ICA  Diameter.    Impression:  This is an 89 year old male with a symptomatic right ICA stenosis. Chronic occlusion of left ICA.  Dr. SONIDO Davis reviewed above imaging today.      The CTA imaging appears to demonstrate no more than 50% stenosis of the right ICA.    The carotid ultrasound imaging is partially elevated secondary to contralateral occulusion.      Current CMS guidelines for carotid stenting require a symptomatic stenosis that is at least 80%.  It is very unlikely that the right ICA has stenosis that measures 80-99%.    Therefore the patient would not meet CMS criteria for carotid stenting.  If there is clinical concern we could do a formal catheter angiogram to  confirm.    Thank you for asking us to review imaging and provide recommendation/consultation.  Please call our office with any questions or concerns.  [phone]    ASPEN Jarvis-CNP

## 2018-01-02 NOTE — PROGRESS NOTES
Care Transition Initial Assessment - [facility]  Reason For Consult: discharge planning  Met with: PATIENT,FAMILY    Active Problems:    Stroke of unknown etiology (H)         DATA  Lives With: significant other  Living Arrangements: house  Description of Support System: Supportive, Involved  Who is your support system?: Significant Other, Neighbor  Support Assessment: Adequate family and caregiver support, Adequate social supports.   Identified issues/concerns regarding health management: Need for increased support at time of discharge      Transportation Available: family or friend will provide    ASSESSMENT  Cognitive Status:  Awake, alert, oriented  Concerns to be addressed: Discharge planning    SW reviewed chart and met with patient to discuss discharge planning.  Patient was admitted 12/31/17 with Stroke of unknown etiology.  Anticipated discharge date: 1/3/18. SW introduced self and role.  Present in room along with patient were his Significant Other-Halina and his neighbor Hieu.  Patient agreed to speak to SW in front of all.  Patient confirmed he resides in his [facility] home w/Halina.  Patient has a son, Leonidas, in [place], [place] and a Step-daughter. Halina stated patient receives no outside services in the home; has not driven for several years.  We reviewed therapy recommendation for TCU.  Patient agrees with this, stating he was a [facility] once before and would prefer to return there.  Patient also agrees to a referral sent to [facility].  SW placed referrals thru DOD.  Will await call backs.  Halina prefers to drive patient to TCU,  but does accept private cost for HE transportation if staff does not feel patient could be safely transported in a private vehicle.      PLAN  Financial costs for the patient includes:  Transportation, if applicable  Patient given options and choices for discharge:  Yes .  Patient/family is agreeable to the plan?  YES  Patient Goals and Preferences: Discharge to  TCU.  Patient anticipates discharging to:  TCU.      Continue to assist with discharge planning as needed to ensure a safe discharge.    ROYA Calles

## 2018-01-02 NOTE — CONSULTS
HISTORY OF PRESENT ILLNESS:  Mr. Dustin Pearce was admitted on 01/01/2018.  He awoke with some confusion and unsteadiness that morning, feeling fine the morning before.  His speech was also noted to be slightly slurred by his wife, who lives with him.  He became more confused and unstable involving all extremities as the morning progressed.  He fell down and she called 911 and brought him to the Emergency Department.  Upon arrival of the paramedics, he was not bradycardic or hypotensive.  Symptoms lasted until later that day and have not resolved.      He has a past history significant for a left hemispheric cerebrovascular incident in 1999 with a known left internal carotid occlusion.  He had a somewhat similar episode in 01/2017 to the symptoms he had at admission, though they were much more pronounced yesterday.  The patient has a known stable moderate stenosis of the right internal carotid artery.      The patient has a history of cardiac problems.  He is on chronic aspirin and Plavix.  He has ischemic cardiomyopathy with echocardiogram on admission revealing ejection fraction of 35-40%, which is essentially unchanged.  The patient does not check his blood pressure at home.  He did have some signs of orthostatic hypotension when he was seeing the cardiologist more recently.  Wife reports that he had several episodes where he would become confused and have near syncopal type events, but again, the blood pressure was not checked to see if he was hypotensive or bradycardic.  He has had a Holter monitor in the past which was apparently unremarkable.      On his workup after upon this admission, he had a chronic occlusion of the left internal carotid artery.  There was a 50% calcified stenosis but no obvious ulceration of the right internal carotid artery.  The right distal cervical and intracerebral portions of the internal carotid were quite tortuous, no high grade stenosis.  A fairly normal [place] was  noted.  MRI revealed old left frontal CVI with no acute changes.      We were asked to see the patient in vascular surgical consultation.      Past medical history is well documented in the chart.  He has underlying ischemic cardiomyopathy, hyperlipidemia, with most recent LDL on statins of 39, type 2 diabetes mellitus, hypertension, cerebral vascular event.      The patient also history of peripheral artery disease.  He developed a toe ulceration in 06/2017.  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  Prior to this, we found a critical stenosis of his left common iliac artery that was angioplastied and stented with good result.  Common femoral artery/profundus femoral artery were relatively normal.  Distal superficial femoral artery had 50-60% irregular stenosis.  An angioplasty was done on this with fairly good results.  Peroneal and posterior tibials are occluded at its origin, with the anterior tibial artery being relatively disease free to the ankle over where it occluded.  Multiple collaterals to the posterior tibial plantar branches and small distal and dorsalis pedis were noted.  The patient was able to heal the fifth ray amputation following this procedure with no recurrence.      At the time of that admission, we did perform a carotid duplex ultrasound of the right side revealing a 50-60% stenosis.  The peak systolic velocity of the internal carotid artery was 221 cm per second consistent with a 50% stenosis, especially considering the contralateral occlusion.      PHYSICAL EXAMINATION:   GENERAL:  The patient is examined in his room eating a late breakfast.  His wife is present.   VITAL SIGNS:  Blood pressures remained stable through his hospitalization, with a low of 113/52 and a high of 135/75 today.  Heart rate has been in the mid 60s-75, with no evidence of bradycardia on monitoring.   HEENT:  Unremarkable.  No cervical masses.   CHEST:  Clear to auscultation.   CARDIOVASCULAR:  Regular rate,  +3 femoral pulses noted bilaterally.  Pedal pulses not palpable, consistent with known PAD.  Both feet are warm and pink.  He has no distal edema and fairly normal sensation.  Well-healed left fifth ray amputation.      LABORATORY DATA:  I reviewed the CT and MRIs from admission.  Findings are as described above.      We performed a carotid duplex ultrasound this morning on the right side.  This revealed a stable approximately 50% stenosis.  Peak systolic velocity was unchanged at 228 cm per second with a ratio of 3.0.      IMPRESSION AND PLAN:   1.  More global neurological event with confusion, lightheadedness and falling.  Aphasia is somewhat more difficult to determine whether this was a focal problem.  The patient has chronic occlusion of the left internal carotid artery.  He has a stable moderate, non-hemodynamically significant stenosis of the right internal carotid which has not changed by duplex ultrasounds in the last 6 months, inconsistent with prior findings.  I do not feel that that the carotid disease is the cause of his symptoms.  Surgical intervention for carotid endarterectomy, angioplasty and stenting is not warranted since it will not improve his symptoms.  I am more concerned that he may have episodes of hypotension or bradycardia leading to global hyperperfusion causing his symptoms.  This needs to be evaluated further and this has been discussed with the patient and his wife.   2.  Peripheral artery disease.  He has strong femoral pulses following the left common iliac artery angioplasty and stenting earlier this year, with a healed amputation site.  Clinical followup is indicated.      I spent 25 minutes with the patient and his wife today discussing the situation.  Routine carotid duplex ultrasound on the right side would be performed in 1 year.  No need to visualize the left side due to chronic occlusion.         TAMIKO CUNNINGHAM MD             D: 01/02/2018 13:11   T: 01/02/2018 13:38    MT: TS      Name:     SID AGUIAR   MRN:      [id number]        Account:       [id number]   :      1928           Consult Date:  2018      Document: [id number]       cc: Roland Rodriguez MD

## 2018-01-02 NOTE — PLAN OF CARE
Problem: Patient Care Overview  Goal: Plan of Care/Patient Progress Review  Discharge Planner PT   Patient plan for discharge: none specifically stated  Current status: Pt completed cognitive assessment of SLUMS with pt score of 12/30 indicates severe cognitive deficits. Pt requires cues with assist for problem solving during dressing, completed toilet transfer with grab bar SBA. Pt c/o dizziness with ADL activity, /60  Barriers to return to prior living situation: cognition, level of assist/supervision for functional mobility  Recommendations for discharge: TCU per plan established by the Occupational THerapist  Rationale for recommendations: not at baseline       Entered by: Diana Hoffmann 01/02/2018 12:15 PM

## 2018-01-02 NOTE — PLAN OF CARE
Problem: Patient Care Overview  Goal: Plan of Care/Patient Progress Review  Outcome: No Change  Patient is forgetful. Left sided weakness is better . Left field cut-- patient has history of macular degeneration.  he is on RA. Up with one assist. Denies pain.  -Rounded on often and encouraged to call for all help and assistance. Will cont to monitor.

## 2018-01-02 NOTE — PLAN OF CARE
Problem: Patient Care Overview  Goal: Plan of Care/Patient Progress Review  Discharge Planner PT   Patient plan for discharge: home   Current status: Pt is independent with bed mobility and transfers using a SEC. Gait with a FWW x 400' with SBA, gait with a SEC x 400' with CGA.  Barriers to return to prior living situation: level of assist required, possible stent vs CEA  Recommendations for discharge: TCU  Rationale for recommendations: Pt would benefit from continued PT at either TCU or home PT pending mobility improvement and surgical plan       Entered by: Zohreh Anderson 01/02/2018 10:02 AM

## 2018-01-02 NOTE — PLAN OF CARE
"Problem: Patient Care Overview  Goal: Plan of Care/Patient Progress Review    Patient is forgetful. Left sided weakness is better this evening than it was yesterday. Left field cut-- patient has history of macular degeneration.  /55 (BP Location: Right arm)  Pulse 76  Temp 98.1  F (36.7  C) (Oral)  Resp 16  Ht 1.727 m (5' 8\")  Wt 73.5 kg (162 lb)  SpO2 94%  BMI 24.63 kg/m2 he is on RA. Up with one assist. Denies pain. Wife was at bedside most of the shift -- supportive. Rounded on often and encouraged to call for all help and assistance. Will cont to monitor.             "

## 2018-01-02 NOTE — PLAN OF CARE
Problem: Patient Care Overview  Goal: Plan of Care/Patient Progress Review  Outcome: Improving  Pt A&O x3 confused to time, forgetful. VSS, on RA. LS clear. Tele SD w/ BBB. Denies pain. CMS intact, denies numbness/tingling. Neuro intact, has baseline blurred vision d/t macular degeneration resulting in L neglect and field cut. [place] hearing aides in place. Up A1 GB and walker to bathroom. DD3 Mod CHO diet, , SSI given per order. MRI completed, no acute finding. ECHO completed, see results. Vasc Surg consult ordered, will see patient tomorrow AM. Carotid US ordered. Recommending TCU at time of discharge, perfer [facility] in [place]. SO at bedside. Nrsg will continue monitor.

## 2018-01-02 NOTE — PROGRESS NOTES
[facility]    Hospitalist Progress Note    Assessment & Plan   Dustin Pearce is a 89 year old male who was admitted on 12/31/2017.     Dustin Pearce is an 89-year-old male with a history of coronary artery disease, hypertension, type 2 diabetes, dyslipidemia, macular degeneration, history of stroke, hypertension and chronically occluded left carotid artery who presents with changes of slurred speech and confusion.  He was last normal last night before bed.  He is outside the window for TPA.      1.  TIA  12/31/17CT of his head with and without contrast as well as a CTA.  This did not show any acute change.   -MRI no evidence of acute intracranial pathology, no change from prior study  CT and MRI showed a large chronic infarct anterolateral aspect of left frontal lobe  -much improved today and is very jolly, which is back to his usual personality  -await final recommendations of PT/OT speech  -continue the asa and plavix  Vascular surgery is going to see today about right carotid artery     2.  Hypertension.  The patient will have to be held on his current medications, and he will get permissive hypertension per the Neurology stroke orders.   -can restart his usual BP meds on discharge     3.  Persistent hyperkalemia.  The patient had a potassium of 5.9 on 12/01/2017.  It now is 5.4.   -his creatinine has normalized off the ACE  -recommend that he not be on his ace or arb when he is discharged     4.  History of type 2 diabetes under poor control with an A1c of 8.9.  The patient will be on a medium sliding scale.  We will also start him on 8 units of Lantus as we are going to hold his metformin and glipizide.   -continue current diabetes management      5.  History of hyperlipidemia. restart his statin  6.  History of chronic left carotid artery stenosis.   7.  History of mild cognitive impairment.   8.  History of paroxysmal atrial fibrillation.  The patient is not on anticoagulation.   9.  Code  status was discussed with the patient's wife, and he is full code.   10.  Deep venous thrombosis prophylaxis.  The patient will be on SCDs.       DISPOSITION:  The patient will be admitted under inpatient status.           Meseret Tidwell MD    Interval History   No issues at present.  Vascular surgery to see about right carotid artery    -Data reviewed today: I reviewed all new labs and imaging results over the last 24 hours. I personally reviewed no images or EKG's today.    Physical Exam   Temp: 97.2  F (36.2  C) Temp src: Oral BP: 135/75   Heart Rate: 78 Resp: 16 SpO2: 90 % O2 Device: None (Room air)    Vitals:    12/31/17 1333 01/01/18 0616 01/02/18 0458   Weight: 74.7 kg (164 lb 11.2 oz) 73.5 kg (162 lb) 73.7 kg (162 lb 8 oz)     Vital Signs with Ranges  Temp:  [97.2  F (36.2  C)-98.5  F (36.9  C)] 97.2  F (36.2  C)  Heart Rate:  [62-78] 78  Resp:  [16] 16  BP: (113-135)/(52-75) 135/75  SpO2:  [90 %-95 %] 90 %  I/O last 3 completed shifts:  In: 240 [P.O.:240]  Out: 175 [Urine:175]    Constitutional: alert   Respiratory: clear to auscultation, no wheezing or rhonchi   Cardiovascular: regular rate and rhythm without murmer or rub   GI:positive bowel sounds, non-tender   Skin/Integumen: no rashes    Other:        Medications     - MEDICATION INSTRUCTIONS -       - MEDICATION INSTRUCTIONS -         sodium chloride (PF)  10 mL Intravenous Once     ipratropium  2 spray Both Nostrils Q12H     sodium chloride (PF)  3 mL Intracatheter Q8H     aspirin EC  325 mg Oral Daily    Or     aspirin  300 mg Rectal Daily     insulin glargine  5 Units Subcutaneous Daily     insulin aspart  1-7 Units Subcutaneous TID AC     insulin aspart  1-5 Units Subcutaneous At Bedtime     atorvastatin (LIPITOR) tablet 80 mg  80 mg Oral Daily     clopidogrel (PLAVIX) tablet 75 mg  75 mg Oral Daily     DULoxetine  30 mg Oral Daily     metoprolol  12.5 mg Oral Daily     tamsulosin  0.4 mg Oral Daily       Data     Recent Labs  Lab  01/01/18  0815 12/31/17  2148 12/31/17  1425 12/31/17  1130   WBC 5.3  --   --  9.6   HGB 12.1*  --   --  13.7   MCV 93  --   --  94   *  --   --  160   INR  --   --   --  0.99     --   --  137   POTASSIUM 4.3  --  4.4 5.4*   CHLORIDE 105  --   --  102   CO2 26  --   --  28   BUN 13  --   --  19   CR 0.66  --   --  0.73   ANIONGAP 6  --   --  7   ALLISON 8.2*  --   --  9.5   *  --  193* 222*   TROPI  --  0.033 0.024  --        Recent Results (from the past 24 hour(s))   US Carotid Right    Narrative    RIGHT CAROTID ULTRASOUND   1/2/2018 8:04 AM     HISTORY:  ? Worsening right ICA stenosis;     COMPARISON: None.    RIGHT CAROTID FINDINGS:  Shadowing plaque in the common carotid,  carotid bulb and internal carotid artery  Right ICA PSV:  228  cm/sec.  Right ICA EDV:  51 cm/sec.  Right ICA/CCA PSV Ratio:  3.0    These indicate  50 - 69%  diameter stenosis of the right ICA.    Right Vertebral: Antegrade flow.   Right ECA: Antegrade flow.      Causes of Decreased Accuracy:   None.       Impression    IMPRESSION:    50-69% diameter stenosis of the right ICA relative to the distal ICA  diameter.    BONNIE FARRAR, DO

## 2018-01-02 NOTE — PLAN OF CARE
Problem: Patient Care Overview  Goal: Plan of Care/Patient Progress Review  Discharge Planner SLP   Patient plan for discharge: Patient did not state.  Current status:  Patient was seen for swallow treatment while up in the chair. He was given a snack of toast/jam. He demonstrated slow but sufficient mastication of the solids with minimal oral residue cleared with an additional swallow or liquid rinse. He demonstrated premature entry of thin liquids but no overt Sx of aspiration with the snack. Continues to have cognitive deficits. Recommend: 1. Advance to regular diet and thin liquids. 2. Upright, no straws, small bites/sips, slow rate and alternate liquids/solids every couple of bites.   Barriers to return to prior living situation: Cognition/mobility.  Recommendations for discharge: TCU prior to home.  Rationale for recommendations: Continue ST for swallowing if goals not met. Cognitive linguistic evaluation can be completed at the next level of care as indicated.        Entered by: Chloe Mclain 01/02/2018 1:54 PM

## 2018-01-03 ENCOUNTER — ANESTHESIA EVENT (OUTPATIENT)
Dept: SURGERY | Facility: CLINIC | Age: 83
DRG: 038 | End: 2018-01-03
Payer: MEDICARE

## 2018-01-03 ENCOUNTER — ANESTHESIA (OUTPATIENT)
Dept: SURGERY | Facility: CLINIC | Age: 83
DRG: 038 | End: 2018-01-03
Payer: MEDICARE

## 2018-01-03 ENCOUNTER — APPOINTMENT (OUTPATIENT)
Dept: OCCUPATIONAL THERAPY | Facility: CLINIC | Age: 83
DRG: 038 | End: 2018-01-03
Payer: MEDICARE

## 2018-01-03 PROBLEM — I65.29 CAROTID STENOSIS: Status: ACTIVE | Noted: 2018-01-03

## 2018-01-03 LAB
GLUCOSE BLDC GLUCOMTR-MCNC: 181 MG/DL (ref 70–99)
GLUCOSE BLDC GLUCOMTR-MCNC: 198 MG/DL (ref 70–99)
GLUCOSE BLDC GLUCOMTR-MCNC: 204 MG/DL (ref 70–99)
GLUCOSE BLDC GLUCOMTR-MCNC: 209 MG/DL (ref 70–99)

## 2018-01-03 PROCEDURE — 25000128 H RX IP 250 OP 636: Performed by: NURSE ANESTHETIST, CERTIFIED REGISTERED

## 2018-01-03 PROCEDURE — 3E043XZ INTRODUCTION OF VASOPRESSOR INTO CENTRAL VEIN, PERCUTANEOUS APPROACH: ICD-10-PCS | Performed by: SURGERY

## 2018-01-03 PROCEDURE — 25000128 H RX IP 250 OP 636: Performed by: SURGERY

## 2018-01-03 PROCEDURE — 40000170 ZZH STATISTIC PRE-PROCEDURE ASSESSMENT II: Performed by: SURGERY

## 2018-01-03 PROCEDURE — 97535 SELF CARE MNGMENT TRAINING: CPT | Mod: GO | Performed by: OCCUPATIONAL THERAPY ASSISTANT

## 2018-01-03 PROCEDURE — 25000125 ZZHC RX 250: Performed by: NURSE ANESTHETIST, CERTIFIED REGISTERED

## 2018-01-03 PROCEDURE — 25000131 ZZH RX MED GY IP 250 OP 636 PS 637: Mod: GY | Performed by: INTERNAL MEDICINE

## 2018-01-03 PROCEDURE — 27210794 ZZH OR GENERAL SUPPLY STERILE: Performed by: SURGERY

## 2018-01-03 PROCEDURE — P9041 ALBUMIN (HUMAN),5%, 50ML: HCPCS | Performed by: ANESTHESIOLOGY

## 2018-01-03 PROCEDURE — 99223 1ST HOSP IP/OBS HIGH 75: CPT | Performed by: INTERNAL MEDICINE

## 2018-01-03 PROCEDURE — 12000007 ZZH R&B INTERMEDIATE

## 2018-01-03 PROCEDURE — 25000128 H RX IP 250 OP 636: Performed by: ANESTHESIOLOGY

## 2018-01-03 PROCEDURE — 03CM0Z6: ICD-10-PCS | Performed by: SURGERY

## 2018-01-03 PROCEDURE — 37000008 ZZH ANESTHESIA TECHNICAL FEE, 1ST 30 MIN: Performed by: SURGERY

## 2018-01-03 PROCEDURE — 4A10X4G MONITORING OF CENTRAL NERVOUS ELECTRICAL ACTIVITY, INTRAOPERATIVE, EXTERNAL APPROACH: ICD-10-PCS | Performed by: SURGERY

## 2018-01-03 PROCEDURE — A9270 NON-COVERED ITEM OR SERVICE: HCPCS | Mod: GY | Performed by: SURGERY

## 2018-01-03 PROCEDURE — 25000128 H RX IP 250 OP 636: Performed by: INTERNAL MEDICINE

## 2018-01-03 PROCEDURE — 71000013 ZZH RECOVERY PHASE 1 LEVEL 1 EA ADDTL HR: Performed by: SURGERY

## 2018-01-03 PROCEDURE — 27210995 ZZH RX 272: Performed by: SURGERY

## 2018-01-03 PROCEDURE — 35301 RECHANNELING OF ARTERY: CPT | Mod: LT | Performed by: SURGERY

## 2018-01-03 PROCEDURE — 25000132 ZZH RX MED GY IP 250 OP 250 PS 637: Mod: GY | Performed by: INTERNAL MEDICINE

## 2018-01-03 PROCEDURE — 00000146 ZZHCL STATISTIC GLUCOSE BY METER IP

## 2018-01-03 PROCEDURE — 99232 SBSQ HOSP IP/OBS MODERATE 35: CPT | Performed by: NURSE PRACTITIONER

## 2018-01-03 PROCEDURE — 36000063 ZZH SURGERY LEVEL 4 EA 15 ADDTL MIN: Performed by: SURGERY

## 2018-01-03 PROCEDURE — 97530 THERAPEUTIC ACTIVITIES: CPT | Mod: GO | Performed by: OCCUPATIONAL THERAPY ASSISTANT

## 2018-01-03 PROCEDURE — 36000093 ZZH SURGERY LEVEL 4 1ST 30 MIN: Performed by: SURGERY

## 2018-01-03 PROCEDURE — 40000133 ZZH STATISTIC OT WARD VISIT: Performed by: OCCUPATIONAL THERAPY ASSISTANT

## 2018-01-03 PROCEDURE — 37000009 ZZH ANESTHESIA TECHNICAL FEE, EACH ADDTL 15 MIN: Performed by: SURGERY

## 2018-01-03 PROCEDURE — 25000132 ZZH RX MED GY IP 250 OP 250 PS 637: Mod: GY | Performed by: SURGERY

## 2018-01-03 PROCEDURE — A9270 NON-COVERED ITEM OR SERVICE: HCPCS | Mod: GY | Performed by: INTERNAL MEDICINE

## 2018-01-03 PROCEDURE — 27211022 ZZHC OR IOM SUPPLIES OPNP: Performed by: SURGERY

## 2018-01-03 PROCEDURE — 03CK0ZZ EXTIRPATION OF MATTER FROM RIGHT INTERNAL CAROTID ARTERY, OPEN APPROACH: ICD-10-PCS | Performed by: SURGERY

## 2018-01-03 PROCEDURE — 03UH07Z SUPPLEMENT RIGHT COMMON CAROTID ARTERY WITH AUTOLOGOUS TISSUE SUBSTITUTE, OPEN APPROACH: ICD-10-PCS | Performed by: SURGERY

## 2018-01-03 PROCEDURE — 05BP0ZZ EXCISION OF RIGHT EXTERNAL JUGULAR VEIN, OPEN APPROACH: ICD-10-PCS | Performed by: SURGERY

## 2018-01-03 PROCEDURE — 71000012 ZZH RECOVERY PHASE 1 LEVEL 1 FIRST HR: Performed by: SURGERY

## 2018-01-03 PROCEDURE — 25000566 ZZH SEVOFLURANE, EA 15 MIN: Performed by: SURGERY

## 2018-01-03 PROCEDURE — 25000125 ZZHC RX 250: Performed by: SURGERY

## 2018-01-03 PROCEDURE — 99232 SBSQ HOSP IP/OBS MODERATE 35: CPT | Performed by: INTERNAL MEDICINE

## 2018-01-03 RX ORDER — BUPIVACAINE HYDROCHLORIDE 5 MG/ML
INJECTION, SOLUTION PERINEURAL PRN
Status: DISCONTINUED | OUTPATIENT
Start: 2018-01-03 | End: 2018-01-03 | Stop reason: HOSPADM

## 2018-01-03 RX ORDER — NEOSTIGMINE METHYLSULFATE 1 MG/ML
VIAL (ML) INJECTION PRN
Status: DISCONTINUED | OUTPATIENT
Start: 2018-01-03 | End: 2018-01-03

## 2018-01-03 RX ORDER — FENTANYL CITRATE 50 UG/ML
INJECTION, SOLUTION INTRAMUSCULAR; INTRAVENOUS PRN
Status: DISCONTINUED | OUTPATIENT
Start: 2018-01-03 | End: 2018-01-03

## 2018-01-03 RX ORDER — SODIUM CHLORIDE 9 MG/ML
INJECTION, SOLUTION INTRAVENOUS CONTINUOUS PRN
Status: DISCONTINUED | OUTPATIENT
Start: 2018-01-03 | End: 2018-01-03

## 2018-01-03 RX ORDER — SODIUM CHLORIDE, SODIUM LACTATE, POTASSIUM CHLORIDE, CALCIUM CHLORIDE 600; 310; 30; 20 MG/100ML; MG/100ML; MG/100ML; MG/100ML
INJECTION, SOLUTION INTRAVENOUS CONTINUOUS
Status: DISCONTINUED | OUTPATIENT
Start: 2018-01-03 | End: 2018-01-03 | Stop reason: HOSPADM

## 2018-01-03 RX ORDER — LIDOCAINE HYDROCHLORIDE 20 MG/ML
INJECTION, SOLUTION INFILTRATION; PERINEURAL PRN
Status: DISCONTINUED | OUTPATIENT
Start: 2018-01-03 | End: 2018-01-03

## 2018-01-03 RX ORDER — ACETAMINOPHEN 325 MG/1
650 TABLET ORAL EVERY 4 HOURS PRN
Status: DISCONTINUED | OUTPATIENT
Start: 2018-01-03 | End: 2018-01-04 | Stop reason: HOSPADM

## 2018-01-03 RX ORDER — HYDROMORPHONE HYDROCHLORIDE 1 MG/ML
0.2 INJECTION, SOLUTION INTRAMUSCULAR; INTRAVENOUS; SUBCUTANEOUS
Status: DISCONTINUED | OUTPATIENT
Start: 2018-01-03 | End: 2018-01-04

## 2018-01-03 RX ORDER — CEFAZOLIN SODIUM 2 G/100ML
2 INJECTION, SOLUTION INTRAVENOUS ONCE
Status: COMPLETED | OUTPATIENT
Start: 2018-01-03 | End: 2018-01-03

## 2018-01-03 RX ORDER — HEPARIN SODIUM 1000 [USP'U]/ML
INJECTION, SOLUTION INTRAVENOUS; SUBCUTANEOUS PRN
Status: DISCONTINUED | OUTPATIENT
Start: 2018-01-03 | End: 2018-01-03

## 2018-01-03 RX ORDER — ASPIRIN 600 MG/1
600 SUPPOSITORY RECTAL ONCE
Status: COMPLETED | OUTPATIENT
Start: 2018-01-03 | End: 2018-01-03

## 2018-01-03 RX ORDER — NALOXONE HYDROCHLORIDE 0.4 MG/ML
.1-.4 INJECTION, SOLUTION INTRAMUSCULAR; INTRAVENOUS; SUBCUTANEOUS
Status: DISCONTINUED | OUTPATIENT
Start: 2018-01-03 | End: 2018-01-04

## 2018-01-03 RX ORDER — ALBUMIN, HUMAN INJ 5% 5 %
12.5 SOLUTION INTRAVENOUS ONCE
Status: COMPLETED | OUTPATIENT
Start: 2018-01-03 | End: 2018-01-03

## 2018-01-03 RX ORDER — GLYCOPYRROLATE 0.2 MG/ML
INJECTION, SOLUTION INTRAMUSCULAR; INTRAVENOUS PRN
Status: DISCONTINUED | OUTPATIENT
Start: 2018-01-03 | End: 2018-01-03

## 2018-01-03 RX ORDER — CEFAZOLIN SODIUM 1 G/3ML
INJECTION, POWDER, FOR SOLUTION INTRAMUSCULAR; INTRAVENOUS PRN
Status: DISCONTINUED | OUTPATIENT
Start: 2018-01-03 | End: 2018-01-03

## 2018-01-03 RX ORDER — LIDOCAINE 40 MG/G
CREAM TOPICAL
Status: DISCONTINUED | OUTPATIENT
Start: 2018-01-03 | End: 2018-01-04 | Stop reason: HOSPADM

## 2018-01-03 RX ORDER — PROPOFOL 10 MG/ML
INJECTION, EMULSION INTRAVENOUS PRN
Status: DISCONTINUED | OUTPATIENT
Start: 2018-01-03 | End: 2018-01-03

## 2018-01-03 RX ORDER — HEPARIN SODIUM 1000 [USP'U]/ML
INJECTION, SOLUTION INTRAVENOUS; SUBCUTANEOUS PRN
Status: DISCONTINUED | OUTPATIENT
Start: 2018-01-03 | End: 2018-01-03 | Stop reason: HOSPADM

## 2018-01-03 RX ORDER — SODIUM CHLORIDE 9 MG/ML
500 INJECTION, SOLUTION INTRAVENOUS CONTINUOUS
Status: DISCONTINUED | OUTPATIENT
Start: 2018-01-03 | End: 2018-01-04

## 2018-01-03 RX ORDER — VECURONIUM BROMIDE 1 MG/ML
INJECTION, POWDER, LYOPHILIZED, FOR SOLUTION INTRAVENOUS PRN
Status: DISCONTINUED | OUTPATIENT
Start: 2018-01-03 | End: 2018-01-03

## 2018-01-03 RX ORDER — EPHEDRINE SULFATE 50 MG/ML
INJECTION, SOLUTION INTRAMUSCULAR; INTRAVENOUS; SUBCUTANEOUS PRN
Status: DISCONTINUED | OUTPATIENT
Start: 2018-01-03 | End: 2018-01-03

## 2018-01-03 RX ORDER — MAGNESIUM HYDROXIDE 1200 MG/15ML
LIQUID ORAL PRN
Status: DISCONTINUED | OUTPATIENT
Start: 2018-01-03 | End: 2018-01-03 | Stop reason: HOSPADM

## 2018-01-03 RX ORDER — CALCIUM CHLORIDE 100 MG/ML
INJECTION INTRAVENOUS; INTRAVENTRICULAR PRN
Status: DISCONTINUED | OUTPATIENT
Start: 2018-01-03 | End: 2018-01-03

## 2018-01-03 RX ORDER — TRAMADOL HYDROCHLORIDE 50 MG/1
50 TABLET ORAL EVERY 6 HOURS PRN
Status: DISCONTINUED | OUTPATIENT
Start: 2018-01-03 | End: 2018-01-04 | Stop reason: HOSPADM

## 2018-01-03 RX ORDER — ALBUMIN, HUMAN INJ 5% 5 %
12.5 SOLUTION INTRAVENOUS ONCE
Status: DISCONTINUED | OUTPATIENT
Start: 2018-01-03 | End: 2018-01-03 | Stop reason: HOSPADM

## 2018-01-03 RX ORDER — SODIUM CHLORIDE 9 MG/ML
INJECTION, SOLUTION INTRAVENOUS CONTINUOUS
Status: DISCONTINUED | OUTPATIENT
Start: 2018-01-03 | End: 2018-01-04 | Stop reason: HOSPADM

## 2018-01-03 RX ORDER — SODIUM CHLORIDE, SODIUM LACTATE, POTASSIUM CHLORIDE, CALCIUM CHLORIDE 600; 310; 30; 20 MG/100ML; MG/100ML; MG/100ML; MG/100ML
INJECTION, SOLUTION INTRAVENOUS CONTINUOUS
Status: DISCONTINUED | OUTPATIENT
Start: 2018-01-03 | End: 2018-01-04

## 2018-01-03 RX ORDER — LABETALOL HYDROCHLORIDE 5 MG/ML
10 INJECTION, SOLUTION INTRAVENOUS EVERY 10 MIN PRN
Status: DISCONTINUED | OUTPATIENT
Start: 2018-01-03 | End: 2018-01-04

## 2018-01-03 RX ADMIN — NEOSTIGMINE METHYLSULFATE 4 MG: 1 INJECTION INTRAMUSCULAR; INTRAVENOUS; SUBCUTANEOUS at 19:16

## 2018-01-03 RX ADMIN — DULOXETINE 30 MG: 30 CAPSULE, DELAYED RELEASE ORAL at 08:45

## 2018-01-03 RX ADMIN — CEFAZOLIN 1 G: 1 INJECTION, POWDER, FOR SOLUTION INTRAMUSCULAR; INTRAVENOUS at 18:32

## 2018-01-03 RX ADMIN — PHENYLEPHRINE HYDROCHLORIDE 100 MCG: 10 INJECTION INTRAVENOUS at 19:08

## 2018-01-03 RX ADMIN — SODIUM CHLORIDE: 9 INJECTION, SOLUTION INTRAVENOUS at 16:30

## 2018-01-03 RX ADMIN — PHENYLEPHRINE HYDROCHLORIDE 100 MCG: 10 INJECTION INTRAVENOUS at 16:16

## 2018-01-03 RX ADMIN — LIDOCAINE HYDROCHLORIDE 80 MG: 20 INJECTION, SOLUTION INFILTRATION; PERINEURAL at 16:16

## 2018-01-03 RX ADMIN — EPINEPHRINE 10 MCG: 1 INJECTION INTRAMUSCULAR; INTRAVENOUS; SUBCUTANEOUS at 16:25

## 2018-01-03 RX ADMIN — INSULIN GLARGINE 3 UNITS: 100 INJECTION, SOLUTION SUBCUTANEOUS at 13:23

## 2018-01-03 RX ADMIN — Medication 10 MG: at 16:22

## 2018-01-03 RX ADMIN — INSULIN ASPART 2 UNITS: 100 INJECTION, SOLUTION INTRAVENOUS; SUBCUTANEOUS at 13:23

## 2018-01-03 RX ADMIN — FENTANYL CITRATE 50 MCG: 50 INJECTION, SOLUTION INTRAMUSCULAR; INTRAVENOUS at 16:13

## 2018-01-03 RX ADMIN — SODIUM CHLORIDE 500 ML: 0.9 INJECTION, SOLUTION INTRAVENOUS at 22:10

## 2018-01-03 RX ADMIN — VASOPRESSIN 1 UNITS: 20 INJECTION INTRAMUSCULAR; SUBCUTANEOUS at 16:33

## 2018-01-03 RX ADMIN — VECURONIUM BROMIDE 2 MG: 1 INJECTION, POWDER, LYOPHILIZED, FOR SOLUTION INTRAVENOUS at 17:08

## 2018-01-03 RX ADMIN — ROCURONIUM BROMIDE 50 MG: 10 INJECTION INTRAVENOUS at 16:17

## 2018-01-03 RX ADMIN — PHENYLEPHRINE HYDROCHLORIDE 100 MCG: 10 INJECTION INTRAVENOUS at 19:11

## 2018-01-03 RX ADMIN — GLYCOPYRROLATE 0.6 MG: 0.2 INJECTION, SOLUTION INTRAMUSCULAR; INTRAVENOUS at 19:16

## 2018-01-03 RX ADMIN — Medication 5000 UNITS: at 17:19

## 2018-01-03 RX ADMIN — PHENYLEPHRINE HYDROCHLORIDE 200 MCG: 10 INJECTION INTRAVENOUS at 17:04

## 2018-01-03 RX ADMIN — PHENYLEPHRINE HYDROCHLORIDE 100 MCG: 10 INJECTION INTRAVENOUS at 18:55

## 2018-01-03 RX ADMIN — CALCIUM CHLORIDE 500 MG: 100 INJECTION, SOLUTION INTRAVENOUS at 16:26

## 2018-01-03 RX ADMIN — LIDOCAINE HYDROCHLORIDE 80 MG: 20 INJECTION, SOLUTION INFILTRATION; PERINEURAL at 19:25

## 2018-01-03 RX ADMIN — ASPIRIN 600 MG: 600 SUPPOSITORY RECTAL at 20:00

## 2018-01-03 RX ADMIN — OMEPRAZOLE 40 MG: 20 CAPSULE, DELAYED RELEASE ORAL at 09:15

## 2018-01-03 RX ADMIN — SODIUM CHLORIDE: 9 INJECTION, SOLUTION INTRAVENOUS at 22:38

## 2018-01-03 RX ADMIN — ONDANSETRON 4 MG: 2 INJECTION INTRAMUSCULAR; INTRAVENOUS at 19:16

## 2018-01-03 RX ADMIN — PHENYLEPHRINE HYDROCHLORIDE 150 MCG: 10 INJECTION INTRAVENOUS at 17:26

## 2018-01-03 RX ADMIN — Medication 12.5 MG: at 08:45

## 2018-01-03 RX ADMIN — EPINEPHRINE 10 MCG: 1 INJECTION INTRAMUSCULAR; INTRAVENOUS; SUBCUTANEOUS at 16:24

## 2018-01-03 RX ADMIN — MIDAZOLAM 2 MG: 1 INJECTION INTRAMUSCULAR; INTRAVENOUS at 16:33

## 2018-01-03 RX ADMIN — ALBUMIN HUMAN 12.5 G: 0.05 INJECTION, SOLUTION INTRAVENOUS at 20:17

## 2018-01-03 RX ADMIN — INSULIN GLARGINE 8 UNITS: 100 INJECTION, SOLUTION SUBCUTANEOUS at 08:45

## 2018-01-03 RX ADMIN — PHENYLEPHRINE HYDROCHLORIDE 100 MCG: 10 INJECTION INTRAVENOUS at 16:24

## 2018-01-03 RX ADMIN — FENTANYL CITRATE 50 MCG: 50 INJECTION, SOLUTION INTRAMUSCULAR; INTRAVENOUS at 16:16

## 2018-01-03 RX ADMIN — Medication 5 MG: at 17:26

## 2018-01-03 RX ADMIN — PHENYLEPHRINE HYDROCHLORIDE 100 MCG: 10 INJECTION INTRAVENOUS at 16:22

## 2018-01-03 RX ADMIN — PHENYLEPHRINE HYDROCHLORIDE 100 MCG: 10 INJECTION INTRAVENOUS at 16:26

## 2018-01-03 RX ADMIN — PHENYLEPHRINE HYDROCHLORIDE 100 MCG: 10 INJECTION INTRAVENOUS at 16:25

## 2018-01-03 RX ADMIN — CALCIUM CHLORIDE 500 MG: 100 INJECTION, SOLUTION INTRAVENOUS at 16:31

## 2018-01-03 RX ADMIN — PHENYLEPHRINE HYDROCHLORIDE 50 MCG: 10 INJECTION INTRAVENOUS at 18:13

## 2018-01-03 RX ADMIN — PHENYLEPHRINE HYDROCHLORIDE 0.6 MCG/KG/MIN: 10 INJECTION, SOLUTION INTRAMUSCULAR; INTRAVENOUS; SUBCUTANEOUS at 16:16

## 2018-01-03 RX ADMIN — CEFAZOLIN SODIUM 2 G: 2 INJECTION, SOLUTION INTRAVENOUS at 16:40

## 2018-01-03 RX ADMIN — ATORVASTATIN CALCIUM 80 MG: 80 TABLET, FILM COATED ORAL at 08:45

## 2018-01-03 RX ADMIN — PHENYLEPHRINE HYDROCHLORIDE 50 MCG: 10 INJECTION INTRAVENOUS at 18:10

## 2018-01-03 RX ADMIN — ASPIRIN 325 MG: 325 TABLET, DELAYED RELEASE ORAL at 08:45

## 2018-01-03 RX ADMIN — PHENYLEPHRINE HYDROCHLORIDE 100 MCG: 10 INJECTION INTRAVENOUS at 16:23

## 2018-01-03 RX ADMIN — VECURONIUM BROMIDE 1 MG: 1 INJECTION, POWDER, LYOPHILIZED, FOR SOLUTION INTRAVENOUS at 18:09

## 2018-01-03 RX ADMIN — PHENYLEPHRINE HYDROCHLORIDE 150 MCG: 10 INJECTION INTRAVENOUS at 17:45

## 2018-01-03 RX ADMIN — IPRATROPIUM BROMIDE 2 SPRAY: 21 SPRAY NASAL at 05:45

## 2018-01-03 RX ADMIN — TAMSULOSIN HYDROCHLORIDE 0.4 MG: 0.4 CAPSULE ORAL at 08:45

## 2018-01-03 RX ADMIN — PHENYLEPHRINE HYDROCHLORIDE 100 MCG: 10 INJECTION INTRAVENOUS at 17:54

## 2018-01-03 RX ADMIN — VECURONIUM BROMIDE 1 MG: 1 INJECTION, POWDER, LYOPHILIZED, FOR SOLUTION INTRAVENOUS at 18:33

## 2018-01-03 RX ADMIN — PHENYLEPHRINE HYDROCHLORIDE 100 MCG: 10 INJECTION INTRAVENOUS at 18:59

## 2018-01-03 RX ADMIN — SODIUM CHLORIDE, POTASSIUM CHLORIDE, SODIUM LACTATE AND CALCIUM CHLORIDE: 600; 310; 30; 20 INJECTION, SOLUTION INTRAVENOUS at 16:06

## 2018-01-03 RX ADMIN — PHENYLEPHRINE HYDROCHLORIDE 100 MCG: 10 INJECTION INTRAVENOUS at 19:04

## 2018-01-03 RX ADMIN — NOREPINEPHRINE BITARTRATE 0.03 MCG/KG/MIN: 1 INJECTION INTRAVENOUS at 16:35

## 2018-01-03 RX ADMIN — Medication 10 MG: at 16:19

## 2018-01-03 RX ADMIN — PROPOFOL 70 MG: 10 INJECTION, EMULSION INTRAVENOUS at 16:16

## 2018-01-03 ASSESSMENT — VISUAL ACUITY
OU: GLASSES
OU: GLASSES;BASELINE

## 2018-01-03 ASSESSMENT — ENCOUNTER SYMPTOMS
DYSRHYTHMIAS: 1
SEIZURES: 0
ORTHOPNEA: 0

## 2018-01-03 ASSESSMENT — ACTIVITIES OF DAILY LIVING (ADL)
ADLS_ACUITY_SCORE: 14

## 2018-01-03 ASSESSMENT — COPD QUESTIONNAIRES
CAT_SEVERITY: MILD
COPD: 1

## 2018-01-03 NOTE — PROGRESS NOTES
[facility]    Hospitalist Progress Note    Assessment & Plan   Dustin Pearce is a 89 year old male who was admitted on 12/31/2017.        Dustin Pearce is an 89-year-old male with a history of coronary artery disease, hypertension, type 2 diabetes, dyslipidemia, macular degeneration, history of stroke, hypertension and chronically occluded left carotid artery who presents with changes of slurred speech and confusion.  He was last normal last night before bed.  He is outside the window for TPA.       1.  TIA  12/31/17-CT of his head with and without contrast as well as a CTA.  This did not show any acute change.   -MRI no evidence of acute intracranial pathology, no change from prior study  CT and MRI showed a large chronic infarct anterolateral aspect of left frontal lobe  -much improved the following day  and is very jolly, which is back to his usual personality  -plan on TCU Thurs or Friday  -continue the asa and plavix  Vascular surgery is going to do a carotid enarterectomy today, as the right carotid artery stenosis is felt to be symptomatic  -the left carotid artery has history of being completely occluded.      2.  Hypertension.  On admission  permissive hypertension per the Neurology stroke orders.   -today as he is going to have surgery, will hold his BP meds   -BPs have been under control without his BP meds  -can restart his usual BP meds on discharge except would hold his ace as he had hyperkalemia on admit and also earlier at clinic visit.  His potassium has been normal since his ace inhibitor was held      3.  Persistent hyperkalemia.  The patient had a potassium of 5.9 on 12/01/2017.  It now is 5.4.   -his creatinine has normalized off the ACE  -recommend that he not be on his ace or arb when he is discharged     4.  History of type 2 diabetes under poor control with an A1c of 8.9.  The patient will be on a medium sliding scale,  started him on 8 units of Lantus and holding  his  metformin and glipizide.   -pt is still on his byetta  -glucoses 198, yesterday 209, 268, 270  -lantus inc to 12 units and an extra 3 units today      5.  History of hyperlipidemia. restarted his statin  6.  History of chronic left carotid artery stenosis.   7.  History of mild cognitive impairment.   8.  History of paroxysmal atrial fibrillation.  The patient is not on anticoagulation.   9.  Code status was discussed with the patient's wife, and he is full code.  10. History of macular degeneration of the left eye, cannot see out of the left eye.  Also has macular degeneration of the left eye, not as severe, and for this he gets intraocular injections.      Deep venous thrombosis prophylaxis.  The patient will be on SCDs.   Code Status: Full Code    Disposition: Expected [facility] or Fri    Meseret Tidwell MD    Interval History   Feels fine, no complaints, to surgery later today as above    -Data reviewed today: I reviewed all new labs and imaging results over the last 24 hours. I personally reviewed no images or EKG's today.    Physical Exam   Temp: 97.6  F (36.4  C) Temp src: Oral BP: 139/67 Pulse: 66 Heart Rate: 70 Resp: 16 SpO2: 95 % O2 Device: None (Room air)    Vitals:    12/31/17 1333 01/01/18 0616 01/02/18 0458   Weight: 74.7 kg (164 lb 11.2 oz) 73.5 kg (162 lb) 73.7 kg (162 lb 8 oz)     Vital Signs with Ranges  Temp:  [97.6  F (36.4  C)-98.3  F (36.8  C)] 97.6  F (36.4  C)  Pulse:  [66-81] 66  Heart Rate:  [67-70] 70  Resp:  [14-16] 16  BP: ()/(44-67) 139/67  SpO2:  [94 %-97 %] 95 %  I/O last 3 completed shifts:  In: 456 [P.O.:450; I.V.:6]  Out: -     Constitutional: alert   Respiratory: clear to auscultation, no wheezing or rhonchi   Cardiovascular: regular rate and rhythm without murmer or rub   GI:positive bowel sounds, non-tender   Skin/Integumen: no rashes    Other:        Medications     - MEDICATION INSTRUCTIONS -       - MEDICATION INSTRUCTIONS -         omeprazole  40 mg  Oral QAM AC     insulin glargine  8 Units Subcutaneous Daily     sodium chloride (PF)  10 mL Intravenous Once     ipratropium  2 spray Both Nostrils Q12H     sodium chloride (PF)  3 mL Intracatheter Q8H     aspirin EC  325 mg Oral Daily    Or     aspirin  300 mg Rectal Daily     insulin aspart  1-7 Units Subcutaneous TID AC     insulin aspart  1-5 Units Subcutaneous At Bedtime     atorvastatin (LIPITOR) tablet 80 mg  80 mg Oral Daily     DULoxetine  30 mg Oral Daily     metoprolol  12.5 mg Oral Daily     tamsulosin  0.4 mg Oral Daily       Data     Recent Labs  Lab 01/01/18  0815 12/31/17  2148 12/31/17  1425 12/31/17  1130   WBC 5.3  --   --  9.6   HGB 12.1*  --   --  13.7   MCV 93  --   --  94   *  --   --  160   INR  --   --   --  0.99     --   --  137   POTASSIUM 4.3  --  4.4 5.4*   CHLORIDE 105  --   --  102   CO2 26  --   --  28   BUN 13  --   --  19   CR 0.66  --   --  0.73   ANIONGAP 6  --   --  7   ALLISON 8.2*  --   --  9.5   *  --  193* 222*   TROPI  --  0.033 0.024  --        No results found for this or any previous visit (from the past 24 hour(s)).

## 2018-01-03 NOTE — PROGRESS NOTES
Vascular Surgery Progress Note         Assessment and Plan:    Assessment:   Mr. Dustin Pearce is an 89-year old man with symptomatic right carotid stenosis/      Plan:   - To OR today for right carotid endareterctomy.  - The risks and benefit of the surgery were explained to the patient.  - Continue aspirin.           Interval History:   Vital signs are stable. No acute event overnight.          Medications:     Current Facility-Administered Medications   Medication     omeprazole (priLOSEC) CR capsule 40 mg     insulin glargine (LANTUS) injection 8 Units     sodium chloride (PF) 0.9% PF flush 10 mL     ipratropium (ATROVENT) 0.03 % spray 2 spray     lidocaine 1 % 1 mL     lidocaine (LMX4) cream     sodium chloride (PF) 0.9% PF flush 3 mL     sodium chloride (PF) 0.9% PF flush 3 mL     Medication Instruction     naloxone (NARCAN) injection 0.1-0.4 mg     ondansetron (ZOFRAN-ODT) ODT tab 4 mg    Or     ondansetron (ZOFRAN) injection 4 mg     prochlorperazine (COMPAZINE) injection 5 mg    Or     prochlorperazine (COMPAZINE) tablet 5 mg    Or     prochlorperazine (COMPAZINE) Suppository 12.5 mg     metoclopramide (REGLAN) tablet 5 mg    Or     metoclopramide (REGLAN) injection 5 mg     Medication Instruction     labetalol (NORMODYNE/TRANDATE) injection 10-40 mg     aspirin EC EC tablet 325 mg    Or     aspirin Suppository 300 mg     glucose 40 % gel 15-30 g    Or     dextrose 50 % injection 25-50 mL    Or     glucagon injection 1 mg     insulin aspart (NovoLOG) inj (RAPID ACTING)     insulin aspart (NovoLOG) inj (RAPID ACTING)     atorvastatin (LIPITOR) tablet 80 mg     DULoxetine (CYMBALTA) EC capsule 30 mg     metoprolol (TOPROL-XL) half-tab 12.5 mg     tamsulosin (FLOMAX) capsule 0.4 mg             Physical Exam:   Temp: 97.6  F (36.4  C) Temp src: Oral BP: 139/67 Pulse: 66 Heart Rate: 70 Resp: 16 SpO2: 95 % O2 Device: None (Room air)      General: AAOx3  HEENT: NC, AT  Respiratory: not in distress  Abdomen: soft,  non-tender  Ext: No cyanosis or edema          Data:     Lab Results   Component Value Date    WBC 5.3 01/01/2018    HGB 12.1 (L) 01/01/2018    HCT 36.5 (L) 01/01/2018     (L) 01/01/2018     01/01/2018    POTASSIUM 4.3 01/01/2018    CHLORIDE 105 01/01/2018    CO2 26 01/01/2018    BUN 13 01/01/2018    CR 0.66 01/01/2018     (H) 01/01/2018    SED 43 (H) 05/31/2017    TROPI 0.033 12/31/2017    AST 43 12/27/2016    ALT 50 12/27/2016    ALKPHOS 90 12/27/2016    BILITOTAL 0.7 12/27/2016    INR 0.99 12/31/2017              Elsa Bryant MD (Alex)  Vascular Surgery Fellow  [phone] (p)

## 2018-01-03 NOTE — PLAN OF CARE
Problem: Patient Care Overview  Goal: Plan of Care/Patient Progress Review  Outcome: No Change  A&Ox4, forgetful. Neuros left field cut and left visual neglec. VSS. Tele NSR with BBB and PAC.  NPO for Right CEA today. Up with assist x1. Denies pain. Plan have CEA today and then DC to TCU when stable. Order has not been placed and patient is not on surgery schedule yet, per MD note stated to be in the afternoon.

## 2018-01-03 NOTE — ANESTHESIA PREPROCEDURE EVALUATION
"Procedure: Procedure(s):  ENDARTERECTOMY CAROTID  Preop diagnosis: UNKNOWN  Allergies   Allergen Reactions     Oxycodone Other (See Comments)     \"TERRIBLE SWEATING\"     Sulfa Drugs      Tetracycline      Patient Active Problem List   Diagnosis     Coronary artery disease involving native coronary artery of native heart without angina pectoris     Mild cognitive impairment     Hyperlipidemia LDL goal <70     Benign non-nodular prostatic hyperplasia with lower urinary tract symptoms     Gastroesophageal reflux disease without esophagitis     Cerebrovascular accident (H)     Carotid artery stenosis     Abdominal aortic aneurysm (H)     Lumbar back pain     Benign essential hypertension     TIA (transient ischemic attack)     Paroxysmal atrial fibrillation (H)     Ischemic cardiomyopathy     Aortic root dilatation (H)     Physical deconditioning     Diabetic ulcer of left foot associated with diabetes mellitus due to underlying condition (H)     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)     Anemia due to blood loss, acute     Diarrhea, unspecified type     Nausea     Acute pain of left shoulder     Balance problems     Generalized muscle weakness     PAD (peripheral artery disease) (H)     Aortic stenosis     RBBB with left anterior fascicular block     Stroke of unknown etiology (H)     Past Medical History:   Diagnosis Date     Aortic root dilatation (H)      Aortic stenosis      Benign essential hypertension 1/6/2017     Benign non-nodular prostatic hyperplasia with lower urinary tract symptoms 10/20/2016     CAD (coronary artery disease)     MI 1970     Cardiomyopathy (H)      Carotid artery stenosis 10/20/2016    chronically occluded left internal carotid artery     Carotid occlusion, left      Coronary artery disease involving native coronary artery of native heart without angina pectoris 10/20/2016     Diabetes mellitus (H)      DJD (degenerative joint disease)      Gastro-oesophageal reflux disease      " Gastroesophageal reflux disease without esophagitis 10/20/2016     Heart attack      Hyperlipidemia      Hypertension      Mild cognitive impairment 10/20/2016     Nephrolithiasis     RECURRENT     Osteopenia      PAD (peripheral artery disease) (H)     peripheral angiogram 5/2017: The common iliac artery stenoses were stented and the superficial femoral artery stenoses were angioplastied     Paroxysmal atrial fibrillation (H)      PONV (postoperative nausea and vomiting)      RBBB with left anterior fascicular block      Unspecified cerebral artery occlusion with cerebral infarction      Past Surgical History:   Procedure Laterality Date     AMPUTATE FOOT Left 6/4/2017    Procedure: AMPUTATE FOOT;  Sun Add#3: partial left foot amputation - Sagital Saw - Mini C-Arm;  Surgeon: Moe Kirk DPM;  Location:  OR     CHOLECYSTECTOMY       ESOPHAGOSCOPY, GASTROSCOPY, DUODENOSCOPY (EGD), COMBINED N/A 12/26/2016    Procedure: COMBINED ESOPHAGOSCOPY, GASTROSCOPY, DUODENOSCOPY (EGD);  Surgeon: Nasim Louis MD;  Location:  GI     GENITOURINARY SURGERY      KIDNEY STONE REMOVAL X 4     HC UGI ENDOSCOPY W EUS N/A 12/29/2016    Procedure: COMBINED ENDOSCOPIC ULTRASOUND, ESOPHAGOSCOPY, GASTROSCOPY, DUODENOSCOPY (EGD);  Surgeon: Nasim Louis MD;  Location:  GI     HERNIA REPAIR       INCISION AND DRAINAGE FOOT, COMBINED Left 6/6/2017    Procedure: COMBINED INCISION AND DRAINAGE FOOT;  REVISIONAL LEFT FOOT INCISION AND DRAINAGE WITH POSSIBLE FLAP CLOSURE , ANTIBIOTIC SOLUTION ;  Surgeon: Nabil Ann DPM;  Location:  OR     LASER HOLMIUM LITHOTRIPSY URETER(S), INSERT STENT, COMBINED  3/2/2012    Procedure:COMBINED CYSTOSCOPY, URETEROSCOPY, LASER HOLMIUM LITHOTRIPSY URETER(S), INSERT STENT; CYSTOSCOPY, RIGHT RETROGRADES, RIGHT URETEROSCOPY, HOLMIUM LASER, RIGHT STENT PLACEMENT; Surgeon:ANJELICA LEÓN; Location: OR     ROTATOR CUFF REPAIR RT/LT         No current  "facility-administered medications on file prior to encounter.   Current Outpatient Prescriptions on File Prior to Encounter:  LISINOPRIL PO Take 2.5 mg by mouth daily    metoprolol (TOPROL-XL) 25 MG 24 hr tablet Take 0.5 tablets (12.5 mg) by mouth daily   glipiZIDE (GLUCOTROL) 10 MG tablet Take 1 tablet (10 mg) by mouth 2 times daily (before meals)   AMITRIPTYLINE HCL PO Take 25 mg by mouth nightly as needed for sleep   ipratropium (ATROVENT) 0.03 % spray Spray 2 sprays into both nostrils every 12 hours   DULoxetine (CYMBALTA) 30 MG EC capsule Take 1 capsule (30 mg) by mouth daily   omeprazole (PRILOSEC) 40 MG capsule Take 1 capsule (40 mg) by mouth daily Take 30-60 minutes before a meal.   tamsulosin (FLOMAX) 0.4 MG capsule Take 1 capsule (0.4 mg) by mouth daily   aspirin EC 81 MG EC tablet Take 1 tablet (81 mg) by mouth daily   METFORMIN HCL PO Take 1,000 mg by mouth 2 times daily (with meals)   ATORVASTATIN CALCIUM PO Take 80 mg by mouth daily   blood glucose monitoring (NO BRAND SPECIFIED) test strip Use to test blood sugar three times daily or as directed.   order for DME Equipment being ordered: True Matrix Blood Glucose meter.   Psyllium (METAMUCIL PO) Take 1 packet by mouth daily as needed    Clopidogrel Bisulfate, [id number], (PLAVIX PO) Take 75 mg by mouth daily      /57 (BP Location: Left arm)  Pulse 66  Temp 36.5  C (97.7  F) (Oral)  Resp 16  Ht 1.727 m (5' 8\")  Wt 73.7 kg (162 lb 8 oz)  SpO2 93%  BMI 24.71 kg/m2    Lab Results   Component Value Date    WBC 5.3 01/01/2018     Lab Results   Component Value Date    RBC 3.93 01/01/2018     Lab Results   Component Value Date    HGB 12.1 01/01/2018     Lab Results   Component Value Date    HCT 36.5 01/01/2018     Lab Results   Component Value Date    MCV 93 01/01/2018     Lab Results   Component Value Date    MCH 30.8 01/01/2018     Lab Results   Component Value Date    MCHC 33.2 01/01/2018     Lab Results   Component Value Date    RDW 12.7 " 01/01/2018     Lab Results   Component Value Date     01/01/2018     Lab Results   Component Value Date    INR 0.99 12/31/2017       Last Basic Metabolic Panel:  Lab Results   Component Value Date     01/01/2018      Lab Results   Component Value Date    POTASSIUM 4.3 01/01/2018     Lab Results   Component Value Date    CHLORIDE 105 01/01/2018     Lab Results   Component Value Date    ALLISON 8.2 01/01/2018     Lab Results   Component Value Date    CO2 26 01/01/2018     Lab Results   Component Value Date    BUN 13 01/01/2018     Lab Results   Component Value Date    CR 0.66 01/01/2018     Lab Results   Component Value Date     01/01/2018     Echo 12/31/2017  Interpretation Summary     The left ventricle is normal in size. There is mild to moderate concentric  left ventricular hypertrophy. Left ventricular systolic function is moderately  reduced. The visual ejection fraction is estimated at 35-40%. Grade I or early  diastolic dysfunction. There is severe hypokinesis to akinesis of the entire  inferior and mid to basal inferolateral walls. There is no thrombus seen in  the left ventricle.  The right ventricle is normal size. The right ventricular systolic function is  normal.  Trace to mild mitral and tricuspid regurgitation.  There is moderate trileaflet aortic sclerosis. No aortic regurgitation is  present. Transaortic gradients are mildly increased consistent with aortic  sclerosis. The peak AoV pressure gradient is 15.2 mmHg. The mean AoV pressure  gradient is 7.9 mmHg.  Mild aortic root dilatation.  No pericardial effusion.  In comparison to the previous report dated 01/21/2017, the findings are  similar.  _____________________________________________________________________________  __        Left Ventricle  The left ventricle is normal in size. There is mild to moderate concentric  left ventricular hypertrophy. Left ventricular systolic function is moderately  reduced. The visual ejection  fraction is estimated at 35-40%. Grade I or early  diastolic dysfunction. There is severe hypokinesis to akinesis of the entire  inferior and mid to basal inferolateral walls. There is no thrombus seen in  the left ventricle.     Right Ventricle  The right ventricle is normal size. The right ventricular systolic function is  normal.     Atria  The left atrium is mildly dilated. Right atrial size is normal. There is no  color Doppler evidence of an atrial shunt.     Mitral Valve  The mitral valve leaflets are mildly thickened. There is moderate mitral  annular calcification. There is trace to mild mitral regurgitation.        Tricuspid Valve  There is trace tricuspid regurgitation.     Aortic Valve  There is moderate trileaflet aortic sclerosis. No aortic regurgitation is  present. Transaortic gradients are mildly increased consistent with aortic  sclerosis. The peak AoV pressure gradient is 15.2 mmHg. The mean AoV pressure  gradient is 7.9 mmHg.     Pulmonic Valve  There is no pulmonic valvular stenosis.     Vessels  Mild aortic root dilatation. Normal size ascending aorta. The IVC is normal in  size and reactivity with respiration, suggesting normal central venous  pressure.     Pericardium  There is no pericardial effusion.     Carotid ultrasound  RIGHT CAROTID ULTRASOUND   1/2/2018 8:04 AM      HISTORY:  ? Worsening right ICA stenosis;      COMPARISON: None.     RIGHT CAROTID FINDINGS:  Shadowing plaque in the common carotid,  carotid bulb and internal carotid artery  Right ICA PSV:  228  cm/sec.  Right ICA EDV:  51 cm/sec.  Right ICA/CCA PSV Ratio:  3.0    These indicate  50 - 69%  diameter stenosis of the right ICA.    Right Vertebral: Antegrade flow.   Right ECA: Antegrade flow.       Causes of Decreased Accuracy:   None.          IMPRESSION:    50-69% diameter stenosis of the right ICA relative to the distal ICA  Diameter.    Brain MRI  FINDINGS: There is moderate diffuse cerebral volume loss. There  are  patchy periventricular areas of abnormal T2 signal hyperintensity in  the cerebral white matter bilaterally that are consistent with sequela  of chronic small vessel ischemic disease. A moderate sized chronic  left frontal infarct is again noted without change.     The ventricles and basal cisterns are within normal limits in  configuration given the degree of cerebral volume loss. There is no  midline shift. There are no extra-axial fluid collections. There is no  evidence for stroke or acute intracranial hemorrhage. There is no  abnormal contrast enhancement in the brain or its coverings.      There is no sinusitis or mastoiditis.         IMPRESSION: Diffuse cerebral volume loss and cerebral white matter  changes consistent with chronic small vessel ischemic disease.  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 No evidence for acute intracranial pathology. No change from  the comparison study.       Brain perfusion  CT BRAIN PERFUSION 12/31/2017 11:56 AM     COMPARISON: None.     HISTORY:  Code Stroke; left-sided weakness.     TECHNIQUE: Time sequential axial CT images of the head were acquired  during the administration of intravenous contrast (50 mL Isovue-370).  CTA images of the [place] as well as color perfusion maps of  the brain were created from this time sequential axial source data.         IMPRESSION: There is a large chronic infarct at the anterolateral  aspect of the left frontal lobe. There are no other perfusion defects.     Radiation dose for this scan was reduced using automated exposure  control, adjustment of the mA and/or kV according to patient size, or  iterative reconstruction technique.     CT angiogram of head and neck  FINDINGS:   Neck CTA: The common carotid arteries bilaterally remain patent  without stenosis. Chronic occlusion of the left internal carotid  artery beginning at the origin again noted. Atherosclerotic plaque  resulting in less  than 50% luminal diameter stenosis of the origin of  the right internal carotid artery again noted. The cervical internal  carotid artery is tortuous but is otherwise patent with no additional  areas of narrowing. There is mild atherosclerotic narrowing at the  origin of the left vertebral artery. The right vertebral artery is  patent without stenosis.     Head CTA: There is calcified atherosclerotic plaque of the  intracranial distal internal carotid artery on the right that does not  result in stenosis. The left internal carotid artery is occluded.  There is moderate atherosclerotic narrowing at the distal P2 segment  of the left posterior cerebral artery that is unchanged. The basilar  artery is patent without stenosis. The bilateral anterior cerebral,  bilateral middle cerebral and right posterior cerebral arteries are  patent without stenosis. The anterior communicating and bilateral  posterior communicating arteries remain patent.         IMPRESSION:  1. 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  2. Moderate atherosclerotic narrowing of the distal P2 segment of the  left posterior cerebral artery again noted without change.  3. Atherosclerotic plaque at the origin of the right internal carotid  artery resulting in less than 50% luminal diameter stenosis again  noted.  4. No evidence for intracranial cerebral artery occlusion to explain  the patient's recent symptoms.  5. Overall, no change from the comparison study.    EKG 11/3/2017  Sinus  Rhythm    Frequent pvcs -ventricular bigeminy   Bifascicular block     GATED MYOCARDIAL PERFUSION SCINTIGRAPHY WITH INTRAVENOUS PHARMACOLOGIC  VASODILATATION LEXISCAN -ONE DAY STUDY      3/10/2017 2:44 PM  SIDKIRT ARANAJADYN  89 years  Male  1/31/1928.     Indication/Clinical History: Cardiomyopathy     Impression  1.  Myocardial perfusion imaging using single isotope technique  demonstrated a medium-sized  perfusion defect of severe intensity  involving the mid to basal inferior and inferoseptal walls as well as  the basal inferolateral wall which is mildly reversible and consistent  with a prior myocardial infarction in the RCA/circumflex distribution  with mild ezekiel-infarct ischemia.   2. Gated images demonstrated akinesis of the mid to basal inferior and  inferolateral walls.  The left ventricular systolic function is 37% at  rest and 28% on the post stress images.  3. There is no previous study for comparison.         Anesthesia Evaluation     . Pt has had prior anesthetic. Type: General    History of anesthetic complications   - PONV        ROS/MED HX    ENT/Pulmonary:     (+)mild COPD (rare inhaler use; uncommon nighttime oxygen use), , . .   (-) sleep apnea and recent URI   Neurologic: Comment: Mild cognitive impairment    (+)CVA (1999) date: 1999- left frontal stroke without deficitsTIA date: 1/2018 - confusion/ slurred speeck    (-) seizures and migraines   Cardiovascular: Comment: Bilateral carotid stenosis, left chronically occluded     AAA, 3.2 cm by CT scan    S/p Left Common Iliac Stent this admit (6/2/2017)    CM ischemic, with decreased EF    Dilated aortic root    Seen by cardiology 12/14/2017 for routine follow up - medical management     (+) Dyslipidemia, hypertension-Peripheral Vascular Disease-- Carotid Stenosis and Other, CAD (medical management), -past MI (1970's),-. Taking blood thinners : . CHF etiology: Ischemic Cardiomyopathy - RCA distribution infarct Last EF: ~30-35% date: 2017 . . :. dysrhythmias (Paroxysmal) a-fib, valvular problems/murmurs type: AS mild:.      (-) orthopnea/PND and syncope   METS/Exercise Tolerance:  >4 METS   Hematologic:         Musculoskeletal: Comment: osteopenia  (+) arthritis, , , -       GI/Hepatic: Comment: Admitted 12/23/2016 with abdominal pain    Acute pancreatic cancer    Pancreatic cancer    (+) GERD Asymptomatic on medication,      (-) liver disease    Renal/Genitourinary:     (+) chronic renal disease, type: CRI, Nephrolithiasis , BPH,       Endo:     (+) type II DM Not using insulin Diabetic complications: neuropathy cardiac problems, .   (-) thyroid disease   Psychiatric:     (+) psychiatric history depression      Infectious Disease: Comment: Osteomyelitis-cellulitis foot        Malignancy:         Other: Comment: Hearing loss                    Physical Exam  Normal systems: cardiovascular, pulmonary and dental    Airway   Mallampati: III  TM distance: >3 FB  Neck ROM: limited    Dental     Cardiovascular   Rhythm and rate: regular and normal      Pulmonary    breath sounds clear to auscultation                    Anesthesia Plan      History & Physical Review  History and physical reviewed and following examination; no interval change.    ASA Status:  3 .    NPO Status:  > 8 hours    Plan for General and ETT with Propofol induction. Maintenance will be Balanced.    PONV prophylaxis:  Ondansetron (or other 5HT-3) and Dexamethasone or Solumedrol  Additional equipment: Videolaryngoscope and Arterial Line      Postoperative Care  Postoperative pain management:  IV analgesics.      Consents  Anesthetic plan, risks, benefits and alternatives discussed with:  Patient..                          .

## 2018-01-03 NOTE — PLAN OF CARE
Problem: Patient Care Overview  Goal: Plan of Care/Patient Progress Review  Discharge Planner OT   Patient plan for discharge: none stated  Current status: Pt completes transfers with overall SBA, amb with FWW CGA with cues for attention to right side as pt runs into objects on right during mobility, standing at sink ADLS SBA.   Barriers to return to prior living situation: cognition, current level of assist  Recommendations for discharge: TCU per plan established by the Occupational THerapist  Rationale for recommendations: not at baseline       Entered by: Diana Hoffmann 01/03/2018 10:27 AM

## 2018-01-03 NOTE — PROGRESS NOTES
VASCULAR SURGERY    I spoke with Dr Fortune about Dustinvega Pearce.  Was noted that he had left-sided weakness upon admission.  This was not documented in the emergency department providers report or hospitalist H&P.  Neurology had noticed this as had the nursing staff.  This had resolved.    Return to talk to the patient's wife along with his stepchildren and the patient.  The wife reports that she thinks that this may have been accurate that the left was somewhat weaker.    This would change her opinion about whether surgical intervention on the right carotid stenosis is indicated.  This is a calcified stenosis in the range of 50-60% with no obvious ulceration on CTA but the calcification makes this difficult to determine.  Cerebral angiogram would not necessarily clarify the situation.  If it indeed is an ulcerated carotid stenosis this could present with these symptoms at least the left arm weakness.      I reviewed the situation with the patient and his family.  We reviewed the images for the CTA and ultrasounds.  They are aware that the stenosis itself does not present any vascular concerns since it is not hemodynamically significant but an ulceration is of concern.  With this new information I feel would be reasonable to consider a right carotid endarterectomy.  This would be performed with shunting and EEG monitoring due to the contralateral occlusion.  We discussed the operative procedure at length today.  I spent over 30 minutes with the patient and his family this evening.  He had no further questions.    At the present time they are willing to proceed.  I will hold his evening Plavix and keep him n.p.o. after midnight for tentative surgery tomorrow afternoon.      Roland Rodriguez MD

## 2018-01-03 NOTE — ANESTHESIA PROCEDURE NOTES
ARTERIAL LINE PROCEDURE NOTE:   Pre-Procedure  Performed by YANNICK MAGAÑA  Location: pre-op      Pre-Anesthestic Checklist: patient identified, IV checked, risks and benefits discussed and informed consent    Timeout  Correct Patient: Yes   Correct Procedure: Yes   Correct Site: Yes   Correct Laterality: N/A   Correct Position: Yes   Site Marked: N/A   .   Procedure Documentation  Procedure: arterial line  ASA 3  Supine  Insertion Site:left, radial.Skin infiltrated with 0.5 mL of 1% lidocaine. Injection technique: Seldinger Technique and ultrasound guided  .  .  Patient Prep;chlorhexidine gluconate and isopropyl alcohol, patient draped    Assessment/Narrative    Catheter: 20 gauge, 1.75 in/4.5 cm quick cath (integral wire)     Secured by suture  Tegaderm dressing used.    Arterial waveform: Yes     Comments:  Arterial Line  No complications  Patient tolerated procedure well   No complications

## 2018-01-03 NOTE — PLAN OF CARE
Problem: Patient Care Overview  Goal: Plan of Care/Patient Progress Review  Outcome: Improving  Patient alert x 3, forgetful, slight unsteady on left when up and left field cut, patient up with 1 assist GB and walker, plan for CEA tomorrow

## 2018-01-03 NOTE — PROGRESS NOTES
VASCULAR SURGERY    Pt has decided to proceed with Right CEA later today.  Discussed again with pt and his wife.      Roland Rodriguez MD

## 2018-01-03 NOTE — PLAN OF CARE
Problem: Patient Care Overview  Goal: Plan of Care/Patient Progress Review  PT session cancelled - pt in the OR.

## 2018-01-03 NOTE — PLAN OF CARE
Alert and oriented x4. VSS. Blood glucose was 198, 202 and 184. Up A1. Denies pain. Neuros intact. Forgetful. Sent to pre op for surgery today and will be transferred to station 33.

## 2018-01-03 NOTE — PROGRESS NOTES
D: HUBER following for DC planning.   I: Pt has been accepted at [facility]. HUBER updated [place] in admissions. Pt transferring to station 33 today. Anticipate DC Thursday.   P: HUBER will follow.     ESAU Foreman, LGSW  [id number]

## 2018-01-03 NOTE — CONSULTS
[facility]    Vascular Medicine Consultation     Chief Complaint      Carotid endarterectomy right side  Left sided weakness    Date of Admission:  12/31/2017    Dustin Pearce is a 89 year old male who was admitted on 12/31/2017. I was asked to see the patient for carotid endarterectomy for symptomatic less than 70% stenosis.    Code Status    Full code    Reason for Consult   Reason for consult: As above    Primary Care Physician   Matt Morrissey      History is obtained from the patient and his wife    History of Present Illness   Dustin Pearce is a 89 year old male who presents with left-sided weakness, history of fall, with mild cognitive impairment, carotid ultrasound showed evidence of 50-60% stenosis on the right side with neurological symptoms of the left side of the body, patient is diabetic his A1c is 8.2% patient is poorly controlled on metformin and glyburide, patient lipid profile is normal    Past Medical History   I have reviewed this patient's medical history and updated it with pertinent information if needed.   Past Medical History:   Diagnosis Date     Aortic root dilatation (H)      Aortic stenosis      Benign essential hypertension 1/6/2017     Benign non-nodular prostatic hyperplasia with lower urinary tract symptoms 10/20/2016     CAD (coronary artery disease)     MI 1970     Cardiomyopathy (H)      Carotid artery stenosis 10/20/2016    chronically occluded left internal carotid artery     Carotid occlusion, left      Coronary artery disease involving native coronary artery of native heart without angina pectoris 10/20/2016     Diabetes mellitus (H)      DJD (degenerative joint disease)      Gastro-oesophageal reflux disease      Gastroesophageal reflux disease without esophagitis 10/20/2016     Heart attack      Hyperlipidemia      Hypertension      Mild cognitive impairment 10/20/2016     Nephrolithiasis     RECURRENT     Osteopenia      PAD (peripheral artery disease)  (H)     peripheral angiogram 5/2017: The common iliac artery stenoses were stented and the superficial femoral artery stenoses were angioplastied     Paroxysmal atrial fibrillation (H)      PONV (postoperative nausea and vomiting)      RBBB with left anterior fascicular block      Unspecified cerebral artery occlusion with cerebral infarction        Past Surgical History   I have reviewed this patient's surgical history and updated it with pertinent information if needed.  Past Surgical History:   Procedure Laterality Date     AMPUTATE FOOT Left 6/4/2017    Procedure: AMPUTATE FOOT;  Sun Add#3: partial left foot amputation - Sagital Saw - Mini C-Arm;  Surgeon: Moe Kirk DPM;  Location:  OR     CHOLECYSTECTOMY       ESOPHAGOSCOPY, GASTROSCOPY, DUODENOSCOPY (EGD), COMBINED N/A 12/26/2016    Procedure: COMBINED ESOPHAGOSCOPY, GASTROSCOPY, DUODENOSCOPY (EGD);  Surgeon: Nasim Louis MD;  Location:  GI     GENITOURINARY SURGERY      KIDNEY STONE REMOVAL X 4     HC UGI ENDOSCOPY W EUS N/A 12/29/2016    Procedure: COMBINED ENDOSCOPIC ULTRASOUND, ESOPHAGOSCOPY, GASTROSCOPY, DUODENOSCOPY (EGD);  Surgeon: Nasim Louis MD;  Location:  GI     HERNIA REPAIR       INCISION AND DRAINAGE FOOT, COMBINED Left 6/6/2017    Procedure: COMBINED INCISION AND DRAINAGE FOOT;  REVISIONAL LEFT FOOT INCISION AND DRAINAGE WITH POSSIBLE FLAP CLOSURE , ANTIBIOTIC SOLUTION ;  Surgeon: Nabil Ann DPM;  Location:  OR     LASER HOLMIUM LITHOTRIPSY URETER(S), INSERT STENT, COMBINED  3/2/2012    Procedure:COMBINED CYSTOSCOPY, URETEROSCOPY, LASER HOLMIUM LITHOTRIPSY URETER(S), INSERT STENT; CYSTOSCOPY, RIGHT RETROGRADES, RIGHT URETEROSCOPY, HOLMIUM LASER, RIGHT STENT PLACEMENT; Surgeon:ANJELICA LEÓN; Location: OR     ROTATOR CUFF REPAIR RT/LT         Prior to Admission Medications   Prior to Admission Medications   Prescriptions Last Dose Informant Patient Reported? Taking?   AMITRIPTYLINE HCL PO  12/30/2017 at Unknown time Spouse/Significant Other Yes Yes   Sig: Take 25 mg by mouth nightly as needed for sleep   ATORVASTATIN CALCIUM PO 12/30/2017 at Unknown time Spouse/Significant Other Yes Yes   Sig: Take 80 mg by mouth daily   Clopidogrel Bisulfate, [id number], (PLAVIX PO) 12/30/2017 at Unknown time Spouse/Significant Other Yes Yes   Sig: Take 75 mg by mouth daily    DULoxetine (CYMBALTA) 30 MG EC capsule 12/30/2017 at Unknown time Spouse/Significant Other No Yes   Sig: Take 1 capsule (30 mg) by mouth daily   HYDROcodone-acetaminophen (NORCO) 5-325 MG per tablet  Spouse/Significant Other Yes Yes   Sig: Take 1 tablet by mouth every 4 hours as needed for moderate to severe pain   LISINOPRIL PO 12/30/2017 at Unknown time Spouse/Significant Other Yes Yes   Sig: Take 2.5 mg by mouth daily    METFORMIN HCL PO 12/30/2017 at Unknown time Spouse/Significant Other Yes Yes   Sig: Take 1,000 mg by mouth 2 times daily (with meals)   METOPROLOL SUCCINATE ER PO 12/30/2017 at Unknown time Spouse/Significant Other Yes Yes   Sig: Take 12.5 mg by mouth daily   Psyllium (METAMUCIL PO) 12/30/2017 at Unknown time Spouse/Significant Other Yes Yes   Sig: Take 1 packet by mouth daily as needed    aspirin EC 81 MG EC tablet 12/30/2017 at Unknown time Spouse/Significant Other No Yes   Sig: Take 1 tablet (81 mg) by mouth daily   blood glucose monitoring (NO BRAND SPECIFIED) test strip  Spouse/Significant Other No Yes   Sig: Use to test blood sugar three times daily or as directed.   glipiZIDE (GLUCOTROL) 10 MG tablet 12/30/2017 at Unknown time Spouse/Significant Other Yes Yes   Sig: Take 1 tablet (10 mg) by mouth 2 times daily (before meals)   ipratropium (ATROVENT) 0.03 % spray 12/30/2017 at Unknown time Spouse/Significant Other Yes Yes   Sig: Spray 2 sprays into both nostrils every 12 hours   metoprolol (TOPROL-XL) 25 MG 24 hr tablet 12/30/2017 at Unknown time Spouse/Significant Other No Yes   Sig: Take 0.5 tablets (12.5 mg) by  "mouth daily   omeprazole (PRILOSEC) 40 MG capsule 12/30/2017 at Unknown time Spouse/Significant Other No Yes   Sig: Take 1 capsule (40 mg) by mouth daily Take 30-60 minutes before a meal.   order for DME  Spouse/Significant Other No Yes   Sig: Equipment being ordered: [employer] Blood Glucose meter.   tamsulosin (FLOMAX) 0.4 MG capsule 12/30/2017 at Unknown time Spouse/Significant Other No Yes   Sig: Take 1 capsule (0.4 mg) by mouth daily      Facility-Administered Medications: None     Allergies   Allergies   Allergen Reactions     Oxycodone Other (See Comments)     \"TERRIBLE SWEATING\"     Sulfa Drugs      Tetracycline        Social History   I have reviewed this patient's social history and updated it with pertinent information if needed. Dustin Pearce  reports that he quit smoking about 19 years ago. His smoking use included Cigarettes. He has a 30.00 pack-year smoking history. He has never used smokeless tobacco. He reports that he drinks about 4.2 oz of alcohol per week  He reports that he does not use illicit drugs.    Family History   I have reviewed this patient's family history and updated it with pertinent information if needed.   Family History   Problem Relation Age of Onset     Breast Cancer Mother      Heart Failure Father 81       Review of Systems   The 10 point Review of Systems is negative other than noted in the HPI or here.     Physical Exam   Temp: 97.7  F (36.5  C) Temp src: Oral BP: 109/57 Pulse: 66 Heart Rate: 65 Resp: 16 SpO2: 93 % O2 Device: None (Room air)    Vital Signs with Ranges  Temp:  [97.6  F (36.4  C)-98.3  F (36.8  C)] 97.7  F (36.5  C)  Pulse:  [66-81] 66  Heart Rate:  [65-70] 65  Resp:  [14-16] 16  BP: ()/(44-67) 109/57  SpO2:  [93 %-97 %] 93 %  162 lbs 8 oz    Constitutional: awake, alert, cooperative, no apparent distress, and appears stated age  Eyes: Lids and lashes normal, pupils equal, round and reactive to light, extra ocular muscles intact, sclera clear, " conjunctiva normal  ENT: normocepalic, without obvious abnormality, oropharynx pink and moist  Hematologic / Lymphatic: no lymphadenopathy  Respiratory: No increased work of breathing, good air exchange, clear to auscultation bilaterally, no crackles or wheezing  Cardiovascular: regular rate and rhythm, normal S1 and S2 and no murmur noted  GI: Normal bowel sounds, soft, non-distended, non-tender  Skin: no redness, warmth, or swelling, no rashes and no lesions  Musculoskeletal: There is no redness, warmth, or swelling of the joints.  Full range of motion noted.  Motor strength is 5 out of 5 all extremities bilaterally.  Tone is normal.  Neurologic: Awake, alert, oriented to name, place and time.  Cranial nerves II-XII are grossly intact.  Motor is 5 out of 5 bilaterally.    Neuropsychiatric:  Normal affect, memory, insight.  Pulses: Palpable pulses bilateral and equal. No carotid bruits appreciated.     Data   Most Recent 3 CBC's:  Recent Labs   Lab Test  01/01/18   0815  12/31/17   1130  09/28/17   1649   06/09/17   0535   WBC  5.3  9.6   --    --   7.9   HGB  12.1*  13.7  12.7*   < >  10.8*   MCV  93  94   --    --   93   PLT  126*  160   --    --   214    < > = values in this interval not displayed.     Most Recent 3 BMP's:  Recent Labs   Lab Test  01/01/18   0815  12/31/17   1425  12/31/17   1130  12/01/17   0928   NA  137   --   137  139   POTASSIUM  4.3  4.4  5.4*  5.9*   CHLORIDE  105   --   102  104   CO2  26   --   28  30   BUN  13   --   19  17   CR  0.66   --   0.73  0.83   ANIONGAP  6   --   7  5   ALLISON  8.2*   --   9.5  10.6*   GLC  156*  193*  222*  256*     Most Recent 2 LFT's:  Recent Labs   Lab Test  12/27/16   0650  12/25/16   0002   AST  43  19   ALT  50  21   ALKPHOS  90  58   BILITOTAL  0.7  0.6     Most Recent 3 Creatinines:  Recent Labs   Lab Test  01/01/18   0815  12/31/17   1130  12/01/17   0928   CR  0.66  0.73  0.83     Most Recent 3 Hemoglobins:  Recent Labs   Lab Test  01/01/18   0815   12/31/17   1130  09/28/17   1649   HGB  12.1*  13.7  12.7*     Most Recent 3 BNP's:No lab results found.  Most Recent Hemoglobin A1c:  Recent Labs   Lab Test  12/31/17   1425   A1C  8.6*     Most Recent Urinalysis:  Recent Labs   Lab Test  01/20/17   1130   COLOR  Light Yellow   APPEARANCE  Clear   URINEGLC  Negative   URINEBILI  Negative   URINEKETONE  Negative   SG  1.040*   UBLD  Negative   URINEPH  6.5   PROTEIN  Negative   NITRITE  Negative   LEUKEST  Negative   RBCU  0   WBCU  1     Most Recent ESR & CRP:  Recent Labs   Lab Test  12/31/17   1425   05/31/17   1800   SED   --    --   43*   CRP  <2.9   < >  21.2*    < > = values in this interval not displayed.         Assessment & Plan   (G45.1) TIA involving right internal carotid artery  (primary encounter diagnosis)  Comment: Patient is scheduled for right carotid endarterectomy today  Plan: Glucose by meter, Glucose by meter, Glucose by         meter, Glucose by meter, Glucose by meter,         Glucose by meter, Glucose by meter        Patient might benefit from addition of byetta 0.5 subcutaneous b.i.d. for 1 month  Diabetic education  Adherence to diabetic diet    (I65.21) Carotid stenosis, symptomatic w/o infarct, right  Comment: Patient scheduled for surgery today  Plan:     (I10) Essential hypertension  Comment: Controlled      (E11.69) Type 2 diabetes mellitus with other specified complication, without long-term current use of insulin (H)  Comment: As above  Plan: We'll continue to follow      Summary: Will follow up with the patient once he is back from surgery  Target LDL less than 70  Target blood pressure 130/80  Target A1c 6-6.5%    Jarod Alfaro MD

## 2018-01-03 NOTE — PLAN OF CARE
Problem: Patient Care Overview  Goal: Plan of Care/Patient Progress Review  SLP: Patient NPO today for a right CEA this afternoon. Monitor patient closely with swallowing after surgery. Will reschedule for AM.

## 2018-01-04 ENCOUNTER — APPOINTMENT (OUTPATIENT)
Dept: OCCUPATIONAL THERAPY | Facility: CLINIC | Age: 83
DRG: 038 | End: 2018-01-04
Payer: MEDICARE

## 2018-01-04 VITALS
WEIGHT: 164.02 LBS | HEART RATE: 69 BPM | SYSTOLIC BLOOD PRESSURE: 89 MMHG | OXYGEN SATURATION: 94 % | TEMPERATURE: 98.5 F | BODY MASS INDEX: 24.86 KG/M2 | HEIGHT: 68 IN | DIASTOLIC BLOOD PRESSURE: 45 MMHG | RESPIRATION RATE: 16 BRPM

## 2018-01-04 LAB
CREAT SERPL-MCNC: 0.7 MG/DL (ref 0.66–1.25)
ERYTHROCYTE [DISTWIDTH] IN BLOOD BY AUTOMATED COUNT: 13.1 % (ref 10–15)
GFR SERPL CREATININE-BSD FRML MDRD: >90 ML/MIN/1.7M2
GLUCOSE BLDC GLUCOMTR-MCNC: 176 MG/DL (ref 70–99)
GLUCOSE BLDC GLUCOMTR-MCNC: 185 MG/DL (ref 70–99)
GLUCOSE BLDC GLUCOMTR-MCNC: 187 MG/DL (ref 70–99)
GLUCOSE BLDC GLUCOMTR-MCNC: 199 MG/DL (ref 70–99)
GLUCOSE BLDC GLUCOMTR-MCNC: 203 MG/DL (ref 70–99)
GLUCOSE BLDC GLUCOMTR-MCNC: 210 MG/DL (ref 70–99)
HCT VFR BLD AUTO: 31.3 % (ref 40–53)
HGB BLD-MCNC: 10.1 G/DL (ref 13.3–17.7)
MCH RBC QN AUTO: 30.5 PG (ref 26.5–33)
MCHC RBC AUTO-ENTMCNC: 32.3 G/DL (ref 31.5–36.5)
MCV RBC AUTO: 95 FL (ref 78–100)
PLATELET # BLD AUTO: 114 10E9/L (ref 150–450)
POTASSIUM SERPL-SCNC: 4 MMOL/L (ref 3.4–5.3)
RBC # BLD AUTO: 3.31 10E12/L (ref 4.4–5.9)
WBC # BLD AUTO: 6.9 10E9/L (ref 4–11)

## 2018-01-04 PROCEDURE — 40000239 ZZH STATISTIC VAT ROUNDS

## 2018-01-04 PROCEDURE — 25000132 ZZH RX MED GY IP 250 OP 250 PS 637: Mod: GY | Performed by: INTERNAL MEDICINE

## 2018-01-04 PROCEDURE — A9270 NON-COVERED ITEM OR SERVICE: HCPCS | Mod: GY | Performed by: SURGERY

## 2018-01-04 PROCEDURE — 25000132 ZZH RX MED GY IP 250 OP 250 PS 637: Mod: GY | Performed by: SURGERY

## 2018-01-04 PROCEDURE — 82565 ASSAY OF CREATININE: CPT | Performed by: HOSPITALIST

## 2018-01-04 PROCEDURE — 25000128 H RX IP 250 OP 636: Performed by: SURGERY

## 2018-01-04 PROCEDURE — 40000133 ZZH STATISTIC OT WARD VISIT

## 2018-01-04 PROCEDURE — 97535 SELF CARE MNGMENT TRAINING: CPT | Mod: GO

## 2018-01-04 PROCEDURE — 40000884 ZZH STATISTIC STEP DOWN HRS NIGHT

## 2018-01-04 PROCEDURE — 84132 ASSAY OF SERUM POTASSIUM: CPT | Performed by: HOSPITALIST

## 2018-01-04 PROCEDURE — 99232 SBSQ HOSP IP/OBS MODERATE 35: CPT | Performed by: NURSE PRACTITIONER

## 2018-01-04 PROCEDURE — 99239 HOSP IP/OBS DSCHRG MGMT >30: CPT | Performed by: HOSPITALIST

## 2018-01-04 PROCEDURE — 85027 COMPLETE CBC AUTOMATED: CPT | Performed by: SURGERY

## 2018-01-04 PROCEDURE — A9270 NON-COVERED ITEM OR SERVICE: HCPCS | Mod: GY | Performed by: INTERNAL MEDICINE

## 2018-01-04 PROCEDURE — 25000131 ZZH RX MED GY IP 250 OP 636 PS 637: Mod: GY | Performed by: INTERNAL MEDICINE

## 2018-01-04 PROCEDURE — 00000146 ZZHCL STATISTIC GLUCOSE BY METER IP

## 2018-01-04 RX ORDER — HEPARIN SODIUM,PORCINE 10 UNIT/ML
5-10 VIAL (ML) INTRAVENOUS EVERY 24 HOURS
Status: DISCONTINUED | OUTPATIENT
Start: 2018-01-04 | End: 2018-01-04 | Stop reason: HOSPADM

## 2018-01-04 RX ORDER — HYDROCODONE BITARTRATE AND ACETAMINOPHEN 5; 325 MG/1; MG/1
1 TABLET ORAL EVERY 6 HOURS PRN
Qty: 12 TABLET | Refills: 0 | Status: SHIPPED | DISCHARGE
Start: 2018-01-04 | End: 2018-01-11

## 2018-01-04 RX ORDER — HEPARIN SODIUM,PORCINE 10 UNIT/ML
5-10 VIAL (ML) INTRAVENOUS
Status: DISCONTINUED | OUTPATIENT
Start: 2018-01-04 | End: 2018-01-04 | Stop reason: HOSPADM

## 2018-01-04 RX ADMIN — ACETAMINOPHEN 650 MG: 325 TABLET, FILM COATED ORAL at 06:40

## 2018-01-04 RX ADMIN — INSULIN GLARGINE 12 UNITS: 100 INJECTION, SOLUTION SUBCUTANEOUS at 09:00

## 2018-01-04 RX ADMIN — EXENATIDE 5 MCG: 250 INJECTION SUBCUTANEOUS at 09:00

## 2018-01-04 RX ADMIN — DULOXETINE 30 MG: 30 CAPSULE, DELAYED RELEASE ORAL at 08:57

## 2018-01-04 RX ADMIN — INSULIN ASPART 1 UNITS: 100 INJECTION, SOLUTION INTRAVENOUS; SUBCUTANEOUS at 16:43

## 2018-01-04 RX ADMIN — ATORVASTATIN CALCIUM 80 MG: 80 TABLET, FILM COATED ORAL at 08:56

## 2018-01-04 RX ADMIN — INSULIN ASPART 2 UNITS: 100 INJECTION, SOLUTION INTRAVENOUS; SUBCUTANEOUS at 12:39

## 2018-01-04 RX ADMIN — INSULIN ASPART 1 UNITS: 100 INJECTION, SOLUTION INTRAVENOUS; SUBCUTANEOUS at 09:49

## 2018-01-04 RX ADMIN — OMEPRAZOLE 40 MG: 20 CAPSULE, DELAYED RELEASE ORAL at 06:40

## 2018-01-04 RX ADMIN — EXENATIDE 5 MCG: 250 INJECTION SUBCUTANEOUS at 16:29

## 2018-01-04 RX ADMIN — IPRATROPIUM BROMIDE 2 SPRAY: 21 SPRAY NASAL at 06:55

## 2018-01-04 RX ADMIN — TAMSULOSIN HYDROCHLORIDE 0.4 MG: 0.4 CAPSULE ORAL at 08:57

## 2018-01-04 RX ADMIN — SODIUM CHLORIDE, PRESERVATIVE FREE 10 ML: 5 INJECTION INTRAVENOUS at 06:43

## 2018-01-04 RX ADMIN — ASPIRIN 325 MG: 325 TABLET, DELAYED RELEASE ORAL at 08:57

## 2018-01-04 ASSESSMENT — VISUAL ACUITY
OU: NORMAL ACUITY;GLASSES
OU: NORMAL ACUITY;GLASSES

## 2018-01-04 NOTE — DISCHARGE INSTRUCTIONS
Carotid Endartectomy  Post-Operative Instructions    Typical length of stay in the hospital is 1-2 days.  On occasion, an evening in the Intensive Care Unit may be required for blood pressure regulation.    Activity    Most people are able to resume normal activities 1-2 weeks after surgery.  The key is to gradually increase your level of activity as you are able.  It is not uncommon to feel easily fatigued - this will improve with time.      You should avoid heavy lifting more than 30 lbs for 1 week.      You may return to work when you feel able - typically 1-2 weeks after surgery, depending on the type of work you do.      You should not drive a car for 1 week after surgery.  In addition,  you can not be taking prescription strength pain medication and you must feel strong enough to drive safely.    Incision Care    You can shower or bathe 2 days after surgery - avoid rubbing and soaking your incision.      You may have strips of white tape called  steri-strips  across your incision; they should stay on for 5-7 days or until the ends start to curl up.  You can then remove the steri-strips, or we will remove them at your post-operative appointment.      Avoid shaving directly over the incision for 2-3 weeks.    Common Concerns    You may notice mild skin numbness on the side of your surgery in your neck, chin, face, and earlobe.  This is due to nerve  irritation  and will gradually resolve over the next several months.  Call our office if you experience any short-lasting episode of numbness or weakness affecting one side of your body.      Some bruising and firmness around you incision can be expected.  This will soften and resolve over the next few weeks.  You may notice that some discoloration spreads to an area lower than the incision, such as the shoulder, neck or upper chest.  Likewise, swelling can occur near the incision or below the chin.  Call our office if the swelling or bruising worsens, or if you have  difficulty swallowing.    Diet    You may resume a regular diet before you leave the hospital.  You may find that it is best to try smaller, more frequent meals until your appetite returns.    Medications    You may be started on a blood thinner after surgery - typically Aspirin and / or Plavix.      You may be given a prescription for pain medication after surgery.  If you need to have this refilled, please call your pharmacy where you had it filled.  They will then call us for approval.  Please do not call after hours for a refill on pain medication.    Post-Operative Appointments    You need to see your surgeon in the clinic approximately 1-2 weeks after surgery.  Post-operative appointment:   Date: _____________________________  Time: ____________________________  Dr. ______________________________      You will have an ultrasound test and evaluation with your surgeon 90 days (3 months) after surgery.      We will notify you by mail when you are due to schedule further ultrasound testing and evaluation; typically this is done annually.     When You Should Call Our Office    Body aches, chills or temperature greater than 101      Incision redness or drainage      Loss of vision in one eye      Loss of strength / weakness in one side of your body      Severe headache      Difficulty with speech      If you will be having an invasive procedure, such as a colonoscopy or dental work within one year of your surgery, you may need to take an antibiotic prior to the procedure if you had a Dacron patch with your surgery; please call us so we can assist you with this.    Call our main number, [phone], for assistance with any concerns or questions.     Revised 5/2014

## 2018-01-04 NOTE — PLAN OF CARE
Problem: Patient Care Overview  Goal: Plan of Care/Patient Progress Review  PT - Attempted to see twice this morning - first time pt had just had line removed and needed to lie flat x 30 minutes, second time (an hour later) had gotten up with CNA to go to the bathroom and had bleeding from site, so was back in bed.

## 2018-01-04 NOTE — PLAN OF CARE
Problem: Patient Care Overview  Goal: Plan of Care/Patient Progress Review  Discharge Planner OT   Patient plan for discharge: TCU.  Current status: OT goals remain appropriate s/p R CEA yesterday. SB A for ADLs in the bathroom, limited by decreased vision.   Barriers to return to prior living situation: Level of A needed with ADL and mobility  Recommendations for discharge: TCU.  Rationale for recommendations: Continues to benefit from OT here for improved safety with ADL.       Entered by: Jacque Auguste 01/04/2018 10:00 AM

## 2018-01-04 NOTE — PLAN OF CARE
Problem: Patient Care Overview  Goal: Plan of Care/Patient Progress Review  Outcome: No Change  VSS, afebrile. LS clear, IS up to 2500. BS hypo, denies flatus. Neuro's intact. Small amount of dried drainage to neck dressing, no change. ISREAL patent, minimal ss drainage. Dangled at bedside with 1 assist. Unable to void, bladder scanned for 250ml @ 0200 and again for 460 @ 0600. Straight cathed at 0630. Tele- SR with BBB. IMC discontinued at 0600

## 2018-01-04 NOTE — DISCHARGE SUMMARY
[facility]    Discharge Summary  Hospitalist    Date of Admission:  12/31/2017  Date of Discharge:  1/4/2018  Discharging Provider: Gagan Jerome MD  Date of Service (when I saw the patient): 01/04/18    Discharge Diagnoses     TIA    Essential HTN    Hyperkalemia    DM2, uncontrolled    Hyperlipidemia    Mild cognitive impairment    H/o paroxysmal a fib    Urinary retention    Lt eye macular degeneration    History of Present Illness   Dustin Pearce is an 89-year-old male with a history of coronary artery disease, hypertension, type 2 diabetes, dyslipidemia, macular degeneration, history of stroke, hypertension and chronically occluded left carotid artery who presented with changes of slurred speech and confusion. He was last normal night before bed.  He was outside the window for TPA.      Hospital Course   Dustin Pearce was admitted on 12/31/2017.  The following problems were addressed during his hospitalization:      1.  TIA  12/31/17-CT of his head with and without contrast as well as a CTA.  This did not show any acute change.   -MRI no evidence of acute intracranial pathology, no change from prior study  -CT and MRI showed a large chronic infarct anterolateral aspect of left frontal lobe  -Much improved the following day  and was very jolly, which is back to his usual personality.  -Neurology consulted. Vascular surgery as well consulted, and patient underwent Rt carotid enarterectomy, as the right carotid artery stenosis was felt to be symptomatic  -continued on the asa and plavix, statin. BP control as below.  -discharged to TCU    -The left carotid artery has history of being completely occluded.      2.  Hypertension.  On admission  permissive hypertension per the Neurology stroke orders. BP was under control without his BP meds. PTA Metoprolol resumed at discharge.  -PTA Lisinopril was initially held on admission given hyperkalemia, which resolved, and so it was resumed at discharge  given his DM/HTN, as he is on a very low dose. It appears that he had hyperkalemia also earlier at clinic visit. Now that ACEI was resumed at discharge, needs follow up BMP in 3-5 days at TCU, and decide if ACE can be continued ot not.       3.  Hyperkalemia.  The patient had a potassium of 5.9 on 12/01/2017.  -his creatinine remains normal  -Given DM as well, and that his ACEI dose was very small, it was resumed at discharge, with follow up BMP to eval potassium in 3-5 days. If he develops hyperkalemia again, will likely discontinue his ACEI altogether.      4.  History of type 2 diabetes under poor control with an A1c of 8.9.    - his PTA metformin and glipizide held, managed with Lantus, and ISS and Byetta was added. BS still upto 200s. He was discharged on his PTA metformin and glipizide, and Byetta added.     5. Urinary retention:Developed urinary retention, but was able to void subsequently. He did have PVR of 165. Needs to monitor at TCU, and also renal function as outpatient. He normally takes flomax, continued.      6.  History of chronic left carotid artery stenosis.   7.  History of mild cognitive impairment.   8.  History of paroxysmal atrial fibrillation. The patient is not on anticoagulation. ASA and Plavix as above.  9.  Code status was discussed with the patient's wife, and he is full code.  10. History of macular degeneration of the left eye, cannot see out of the left eye  11.  History of hyperlipidemia. restarted his statin    Gagan Jerome MD  Hospitalist    Significant Results and Procedures   Results attached for     CT head    CTA head and neck    MRI brain    2D ECHO    Also had Rt carotid endarterectomy.    Pending Results   These results will be followed up by none    Unresulted Labs Ordered in the Past 30 Days of this Admission     No orders found from 11/1/2017 to 1/1/2018.        Code Status   Full Code       Primary Care Physician   Matt Morrissey    Constitutional: Alert, awake.  Not in distress.   HEENT: PERRLA EOMI, Surgical wound with steri strips Rt neck. CDI.  Respiratory: Bilateral equal air entry, clear to auscultation. No respiratory distress.  Cardiovascular: Regular s1s2, no murmur, rub or gallop. No tachycardia.  GI: Soft, non distended, non tender, bowel tones active.  Skin/Integumen: No rash, no blister.  Neuro: AAOX3, moving all extremities, CN 2-12 intact    Discharge Disposition   Discharged to TCU  Condition at discharge: Stable    Consultations This Hospital Stay   PHYSICAL THERAPY ADULT IP CONSULT  OCCUPATIONAL THERAPY ADULT IP CONSULT  SPEECH LANGUAGE PATH ADULT IP CONSULT  SWALLOW EVAL SPEECH PATH AT BEDSIDE IP CONSULT  SMOKING CESSATION PROGRAM IP CONSULT  NEUROLOGY IP CONSULT  VASCULAR SURGERY IP CONSULT  SOCIAL WORK IP CONSULT  PHYSICAL THERAPY ADULT IP CONSULT  OCCUPATIONAL THERAPY ADULT IP CONSULT    Time Spent on this Encounter   IGagan, personally saw the patient today and spent greater than 30 minutes discharging this patient.    Discharge Orders     Follow-up and recommended labs and tests    Follow up with me,  Roland Rodriguez, within 2 weeks. to evaluate after surgery.  The following labs/tests are recommended: Carotid Duplex in 4 weeks..     Activity   Your activity upon discharge: activity as tolerated.  May shower.     General info for SNF   Length of Stay Estimate: Short Term Care: Estimated # of Days <30  Condition at Discharge: Improving  Level of care:skilled   Rehabilitation Potential: Good  Admission H&P remains valid and up-to-date: Yes  Recent Chemotherapy: N/A  Use Nursing Home Standing Orders: Yes     Mantoux instructions   Give two-step Mantoux (PPD) Per Facility Policy Yes     Reason for your hospital stay   TIA, Rt carotid stenosis.     Follow Up and recommended labs and tests   Follow up with prison physician.  The following labs/tests are recommended: BMP in 3-5 days to follow up on potassium.     Full Code      Physical Therapy Adult Consult   Evaluate and treat as clinically indicated.    Reason:  TIA     Occupational Therapy Adult Consult   Evaluate and treat as clinically indicated.    Reason:  TIA     Diet   Follow this diet upon discharge: Orders Placed This Encounter     Room Service     Advance Diet as Tolerated: Regular     Advance Diet as Tolerated   Follow this diet upon discharge: Orders Placed This Encounter     Diet     Regular Diet Adult       Discharge Medications   Current Discharge Medication List      START taking these medications    Details   exenatide (BYETTA) 5 mcg/0.02mL per dose (60 doses per 1.2 mL prefilled pen) Inject 5 mcg Subcutaneous 2 times daily (before meals)  Qty: 1 Syringe    Associated Diagnoses: Type 2 diabetes mellitus with other specified complication, without long-term current use of insulin (H)         CONTINUE these medications which have CHANGED    Details   HYDROcodone-acetaminophen (NORCO) 5-325 MG per tablet Take 1 tablet by mouth every 6 hours as needed for moderate to severe pain (PRN for post surgical pain.)  Qty: 12 tablet, Refills: 0    Associated Diagnoses: Neck pain         CONTINUE these medications which have NOT CHANGED    Details   LISINOPRIL PO Take 2.5 mg by mouth daily       metoprolol (TOPROL-XL) 25 MG 24 hr tablet Take 0.5 tablets (12.5 mg) by mouth daily    Associated Diagnoses: Cardiomyopathy, unspecified type (H)      glipiZIDE (GLUCOTROL) 10 MG tablet Take 1 tablet (10 mg) by mouth 2 times daily (before meals)  Qty: 180 tablet, Refills: 3    Associated Diagnoses: Type 2 diabetes mellitus with diabetic peripheral angiopathy without gangrene, without long-term current use of insulin (H)      AMITRIPTYLINE HCL PO Take 25 mg by mouth nightly as needed for sleep      ipratropium (ATROVENT) 0.03 % spray Spray 2 sprays into both nostrils every 12 hours      DULoxetine (CYMBALTA) 30 MG EC capsule Take 1 capsule (30 mg) by mouth daily  Qty: 90 capsule, Refills: 3  "   Associated Diagnoses: Polyneuropathy associated with underlying disease (H)      omeprazole (PRILOSEC) 40 MG capsule Take 1 capsule (40 mg) by mouth daily Take 30-60 minutes before a meal.  Qty: 90 capsule, Refills: 3    Associated Diagnoses: Other acute gastritis without hemorrhage      tamsulosin (FLOMAX) 0.4 MG capsule Take 1 capsule (0.4 mg) by mouth daily  Qty: 90 capsule, Refills: 3    Associated Diagnoses: Benign non-nodular prostatic hyperplasia with lower urinary tract symptoms      aspirin EC 81 MG EC tablet Take 1 tablet (81 mg) by mouth daily  Qty: 30 tablet, Refills: 0    Associated Diagnoses: Transient cerebral ischemia, unspecified type      METFORMIN HCL PO Take 1,000 mg by mouth 2 times daily (with meals)      ATORVASTATIN CALCIUM PO Take 80 mg by mouth daily      blood glucose monitoring (NO BRAND SPECIFIED) test strip Use to test blood sugar three times daily or as directed.  Qty: 300 each, Refills: 3    Comments: True Metrix strips or brand desired by insurance  Associated Diagnoses: Hypoglycemia      order for DME Equipment being ordered: True Matrix Blood Glucose meter.  Qty: 1 Device, Refills: 0    Associated Diagnoses: Type 2 diabetes mellitus without complication (H)      Psyllium (METAMUCIL PO) Take 1 packet by mouth daily as needed       Clopidogrel Bisulfate, [id number], (PLAVIX PO) Take 75 mg by mouth daily     Associated Diagnoses: Cough           Allergies   Allergies   Allergen Reactions     Oxycodone Other (See Comments)     \"TERRIBLE SWEATING\"     Sulfa Drugs      Tetracycline      Data   Most Recent 3 CBC's:  Recent Labs   Lab Test  01/04/18   0630  01/01/18   0815  12/31/17   1130   WBC  6.9  5.3  9.6   HGB  10.1*  12.1*  13.7   MCV  95  93  94   PLT  114*  126*  160      Most Recent 3 BMP's:  Recent Labs   Lab Test  01/04/18   0950  01/01/18   0815  12/31/17   1425  12/31/17   1130  12/01/17   0928   NA   --   137   --   137  139   POTASSIUM  4.0  4.3  4.4  5.4*  5.9* "   CHLORIDE   --   105   --   102  104   CO2   --   26   --   28  30   BUN   --   13   --   19  17   CR  0.70  0.66   --   0.73  0.83   ANIONGAP   --   6   --   7  5   ALLISON   --   8.2*   --   9.5  10.6*   GLC   --   156*  193*  222*  256*     Most Recent 2 LFT's:  Recent Labs   Lab Test  12/27/16   0650  12/25/16   0002   AST  43  19   ALT  50  21   ALKPHOS  90  58   BILITOTAL  0.7  0.6     Most Recent INR's and Anticoagulation Dosing History:  Anticoagulation Dose History     Recent Dosing and Labs Latest Ref Rng & Units 8/19/2009 1/20/2017 12/31/2017    INR 0.86 - 1.14 0.99 1.03 0.99        Most Recent 3 Troponin's:  Recent Labs   Lab Test  12/31/17   2148  12/31/17   1425  01/20/17   2235   TROPI  0.033  0.024  0.030     Most Recent Cholesterol Panel:  Recent Labs   Lab Test  12/31/17   1425   CHOL  133   LDL  61   HDL  43   TRIG  147     Most Recent 6 Bacteria Isolates From Any Culture (See EPIC Reports for Culture Details):  Recent Labs   Lab Test  06/06/17   1846  06/04/17   1027  06/04/17   1016  05/31/17   1930  05/31/17   1905   CULT  No anaerobes isolated  No growth  No anaerobes isolated  Moderate growth Staphylococcus aureus*  No anaerobes isolated  No growth  No growth  No growth     Most Recent TSH, T4 and A1c Labs:  Recent Labs   Lab Test  12/31/17   1425   10/20/16   1825   TSH  2.16   < >  4.63*   T4   --    --   0.94   A1C  8.6*   < >  8.5*    < > = values in this interval not displayed.     Results for orders placed or performed during the hospital encounter of 12/31/17   CTA Angiogram Head Neck    Narrative    CT ANGIOGRAM OF THE HEAD AND NECK WITHOUT AND WITH CONTRAST   12/31/2017 11:52 AM     COMPARISON: None.    HISTORY: Code Stroke. Left-sided weakness.    TECHNIQUE:  Precontrast localizing scans were followed by CT  angiography with an injection of 70 mL Isovue-370 nonionic intravenous  contrast material with scans through the head and neck.  Images were  transferred to a separate 3-D  workstation where multiplanar  reformations and 3-D images were created.  Estimates of carotid  stenoses are made relative to the distal internal carotid artery  diameters except as noted.      FINDINGS:   Neck CTA: The common carotid arteries bilaterally remain patent  without stenosis. Chronic occlusion of the left internal carotid  artery beginning at the origin again noted. Atherosclerotic plaque  resulting in less than 50% luminal diameter stenosis of the origin of  the right internal carotid artery again noted. The cervical internal  carotid artery is tortuous but is otherwise patent with no additional  areas of narrowing. There is mild atherosclerotic narrowing at the  origin of the left vertebral artery. The right vertebral artery is  patent without stenosis.    Head CTA: There is calcified atherosclerotic plaque of the  intracranial distal internal carotid artery on the right that does not  result in stenosis. The left internal carotid artery is occluded.  There is moderate atherosclerotic narrowing at the distal P2 segment  of the left posterior cerebral artery that is unchanged. The basilar  artery is patent without stenosis. The bilateral anterior cerebral,  bilateral middle cerebral and right posterior cerebral arteries are  patent without stenosis. The anterior communicating and bilateral  posterior communicating arteries remain patent.      Impression    IMPRESSION:  1. 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  2. Moderate atherosclerotic narrowing of the distal P2 segment of the  left posterior cerebral artery again noted without change.  3. Atherosclerotic plaque at the origin of the right internal carotid  artery resulting in less than 50% luminal diameter stenosis again  noted.  4. No evidence for intracranial cerebral artery occlusion to explain  the patient's recent symptoms.  5. Overall, no change from the comparison  study.      Radiation dose for this scan was reduced using automated exposure  control, adjustment of the mA and/or kV according to patient size, or  iterative reconstruction technique.    LISANDRO MAGALLON MD   CT Head w Contrast    Narrative    CT BRAIN PERFUSION 12/31/2017 11:56 AM    COMPARISON: None.    HISTORY:  Code Stroke; left-sided weakness.    TECHNIQUE: Time sequential axial CT images of the head were acquired  during the administration of intravenous contrast (50 mL Isovue-370).  CTA images of the [place] as well as color perfusion maps of  the brain were created from this time sequential axial source data.      Impression    IMPRESSION: There is a large chronic infarct at the anterolateral  aspect of the left frontal lobe. There are no other perfusion defects.    Radiation dose for this scan was reduced using automated exposure  control, adjustment of the mA and/or kV according to patient size, or  iterative reconstruction technique.    LISANDRO MAGALLON MD   CT Head w/o Contrast    Narrative    CT OF THE HEAD WITHOUT CONTRAST 12/31/2017 11:39 AM     COMPARISON: Brain MR 1/20/2017.    HISTORY: Code Stroke; left-sided weakness.    TECHNIQUE: 5 mm thick axial CT images of the head were acquired  without IV contrast material.    FINDINGS: A moderate-sized chronic left frontal infarct is again noted  without change. There is moderate diffuse cerebral volume loss. There  are subtle patchy areas of decreased density in the cerebral white  matter bilaterally that are consistent with sequela of chronic small  vessel ischemic disease.    The ventricles and basal cisterns are within normal limits in  configuration given the degree of cerebral volume loss.  There is no  midline shift. There are no extra-axial fluid collections.     No intracranial hemorrhage, mass or recent infarct.    The visualized paranasal sinuses are well-aerated. There is no  mastoiditis. There are no fractures of the visualized bones.        Impression    IMPRESSION: Moderate-sized chronic left frontal infarct again noted  without change. Diffuse cerebral volume loss and cerebral white matter  changes consistent with chronic small vessel ischemic disease. No  evidence for acute intracranial pathology.       Radiation dose for this scan was reduced using automated exposure  control, adjustment of the mA and/or kV according to patient size, or  iterative reconstruction technique.    LISANDRO MAGALLON MD   MR Brain w/o & w Contrast    Narrative    MRI OF THE BRAIN WITHOUT AND WITH CONTRAST January 1, 2018 6:13 AM     HISTORY: New onset confusion this morning, also slurred speech,  history of stroke.     TECHNIQUE: Axial diffusion-weighted with ADC map, axial T2-weighted  with fat saturation, axial T1-weighted, axial turboFLAIR and coronal  T1-weighted images of the brain were acquired without intravenous  contrast. Following intravenous administration of gadolinium (7 mL  Gadavist), axial T1-weighted images of the brain were acquired.     COMPARISON: Head CT 12/31/2017.    FINDINGS: There is moderate diffuse cerebral volume loss. There are  patchy periventricular areas of abnormal T2 signal hyperintensity in  the cerebral white matter bilaterally that are consistent with sequela  of chronic small vessel ischemic disease. A moderate sized chronic  left frontal infarct is again noted without change.    The ventricles and basal cisterns are within normal limits in  configuration given the degree of cerebral volume loss. There is no  midline shift. There are no extra-axial fluid collections. There is no  evidence for stroke or acute intracranial hemorrhage. There is no  abnormal contrast enhancement in the brain or its coverings.     There is no sinusitis or mastoiditis.      Impression    IMPRESSION: Diffuse cerebral volume loss and cerebral white matter  changes consistent with chronic small vessel ischemic disease.  Moderate sized chronic left frontal  infarct again noted without  change. No evidence for acute intracranial pathology. No change from  the comparison study.    LISANDRO MAGALLON MD   US Carotid Right    Narrative    RIGHT CAROTID ULTRASOUND   1/2/2018 8:04 AM     HISTORY:  ? Worsening right ICA stenosis;     COMPARISON: None.    RIGHT CAROTID FINDINGS:  Shadowing plaque in the common carotid,  carotid bulb and internal carotid artery  Right ICA PSV:  228  cm/sec.  Right ICA EDV:  51 cm/sec.  Right ICA/CCA PSV Ratio:  3.0    These indicate  50 - 69%  diameter stenosis of the right ICA.    Right Vertebral: Antegrade flow.   Right ECA: Antegrade flow.      Causes of Decreased Accuracy:   None.       Impression    IMPRESSION:    50-69% diameter stenosis of the right ICA relative to the distal ICA  diameter.    BONNIE FARRAR DO     2D ECHO    Interpretation Summary     The left ventricle is normal in size. There is mild to moderate concentric left ventricular hypertrophy. Left ventricular systolic function is moderately reduced. The visual ejection fraction is estimated at 35-40%. Grade I or early diastolic dysfunction. There is severe hypokinesis to akinesis of the entire inferior and mid to basal inferolateral walls. There is no thrombus seen in the left ventricle.  The right ventricle is normal size. The right ventricular systolic function is normal.  Trace to mild mitral and tricuspid regurgitation.  There is moderate trileaflet aortic sclerosis. No aortic regurgitation is present. Transaortic gradients are mildly increased consistent with aortic sclerosis. The peak AoV pressure gradient is 15.2 mmHg. The mean AoV pressure gradient is 7.9 mmHg.  Mild aortic root dilatation.  No pericardial effusion.    In comparison to the previous report dated 01/21/2017, the findings are  similar.

## 2018-01-04 NOTE — PROGRESS NOTES
SW:    I: SW following for discharge planning. Pt has been accepted at [facility] for TCU placement today, 1/4/18. Pt cleared from surgical standpoint for discharge, awaiting hospitalist input.    P: SW to follow. Discharge to [facility] TCU if medically appropriate.     PAS-RR    D: Per DHS regulation, SW completed and submitted PAS-RR to [place] [employer] Direct Connect via the Senior LinkAge Line.  PAS-RR confirmation # is : [id number]    I: SW spoke with pt and they are aware a PAS-RR has been submitted.  SW reviewed with pt that they may be contacted for a follow up appointment within 10 days of hospital discharge if their SNF stay is < 30 days.  Contact information for [facility] LinkAge Line was also provided.    A: Pt verbalized understanding.    P: Further questions may be directed to [facility] at #[phone], option #4 for PAS-RR staff.    ADDENDUM: HUBER spoke with pt and his significant other, Halina. Pt would prefer that wc transport be arranged at discharge and is aware of the private fee for this service. Pt SO discussed that she is unsure if pt will discharge today, though hearing from pt providers that he is medically appropriate to go to TCU. SW discussed that we will make arrangements for discharge today and can cancel if provider feels pt needs to stay. Pt and SO agreeable. HE wc transport arranged for 1715. SW to fax discharge orders to admissions when able at [phone].    1615: HUBER faxed orders to facility admissions [phone].    ESAU Rodriguez, [facility]  Daytime (8:00am-4:30pm): [phone]  After-Hours HUBER Pager (4:30pm-11:30pm): [phone]

## 2018-01-04 NOTE — PLAN OF CARE
Problem: Patient Care Overview  Goal: Plan of Care/Patient Progress Review  Outcome: Completed Date Met: 01/04/18  Discharge instructions reviewed with patient and family, questions answered. Packet for TCU given to transport services. IV removed.

## 2018-01-04 NOTE — PROGRESS NOTES
I called and spoke to neurology, Anna Velasquez NP,  ok from neuro to go to TCU.   I called and spoke to DERIC Ash , ok to from vascular medicine to go to TCU, recommend out on  byetta for patient's diabetes.

## 2018-01-04 NOTE — PLAN OF CARE
Problem: Patient Care Overview  Goal: Plan of Care/Patient Progress Review  Outcome: No Change  VSS, A&OX4, neuros intact.  Denies pain.  Right neck steristrips CD&I.  Old ISREAL site dressing CD&I.  Lungs diminished, hypoactive BS, passing flatus.  Tolerating diet.  Up with [facility].

## 2018-01-04 NOTE — OP NOTE
DATE OF PROCEDURE:  01/03/2018      PREOPERATIVE DIAGNOSIS:  Symptomatic ulcerated calcified stenosis of right internal carotid artery with chronic left internal carotid artery stenosis.      POSTOPERATIVE DIAGNOSIS:  Symptomatic ulcerated calcified stenosis of right internal carotid artery with chronic left internal carotid artery occlusion.      PROCEDURES:   1.  Right carotid endarterectomy with external jugular vein patch angioplasty.   a. Intraoperative shunting and EEG monitoring.      OPERATING SURGEON:  Roland Rodriguez MD       :  Christie Bowman MD ([facility] Surgery Resident)      ANESTHESIA:  General.      PREOPERATIVE MEDICATION:  Ancef 2 grams IV.      INDICATIONS:  Dustin Pearce is an 89-year-old patient with chronic left internal carotid artery occlusion. He has had a 50-60% calcified stenosis of the right carotid bifurcation.  He now presents with symptoms consistent with a right hemispheric TIA.  He had been on aspirin and Plavix when this event occurred, and he has had prior events similar to this.  Despite the noncritical stenosis, we were concerned that this may be ulcerating and causing more problems and, therefore, carotid endarterectomy was indicated.  The patient has cardiomyopathy with ejection fraction of 35%, and has a higher surgical risk.  Risks and benefits of the procedure had been discussed with the patient's family, and they are aware that he is of a higher risk, but want to proceed to prevent further neurological problems since the patient has been living independently.      OPERATIVE PROCEDURE:  A radial arterial line was placed in preinduction.  He was then brought to operating room, induced under general anesthesia, orally intubated.  He became quite hypotensive initially.  He was given medications including pressors for this.  Anesthesia staff did place a left femoral vein triple-lumen catheter for better venous access in this patient with underlying vascular problems.   The patient remained relatively stable hemodynamically during the rest of the procedure with maintaining systolic blood pressure in the 120-140 range.  A rolled towel was placed under his shoulders.  Calf pneumatic compression boots were used, and a pillow was placed under his knees.  The right neck area was prepped and draped in the usual fashion.  I had previously mapped the right external jugular vein, and this was of good quality and caliber.  Continuous EEG monitoring was performed.      Vascular Exposure:  Right neck area was prepped and draped in the usual fashion.  A timeout was called and the sites were identified.  A standard right carotid incision was made.  Dissection was carried down to the platysma.  We identified an excellent external jugular vein and this was dissected free with no side branches being noted.  The mandibular cutaneous nerve was very minuscule, and not reanastomosed following transection.  Sternocleidomastoid muscle was retracted laterally without division.  We identified the internal jugular vein.  There were multiple small venous branches, but no major facial vein was identified.  These branches were divided between 3-0 silk suture.  We dissected down to the common carotid artery.  The ansa cervicalis/hypoglossal nerves were easily visualized.  Several branches of the ansa cervicalis went to the vagus nerves, and these were divided between 4-0 silk sutures for appropriate exposure.  We cautiously dissected around the carotid bulb and proximal ICA due to the possible embolization.  The distal ICA measured over 3 mm in diameter, was free of disease, and it was encircled with Silastic vessel loop.  There was inflammatory changes around the carotid bulb.  The external carotid artery was dissected free and encircled, as was the superior temporal artery.  We dissected free the common carotid artery more proximally.      Carotid Endarterectomy:  5000 units of intravenous heparin were given.   After 5 minutes, the vessel loops were sequentially tightened, with no EEG changes being noted, which was somewhat surprising considering his contralateral occlusion.  We entered the common carotid artery.  We extended the arteriotomy to the distal ICA.  The patient had significant calcified plaque, and there appeared to be two areas that were ulcerated, but it was somewhat difficult to determine due to the extent of disease and the irregular nature of the plaque.  A pre-heparinized Sundt shunt was placed and secured with Ramiro clamps proximally and distally with a stable EEG.      Endarterectomy was performed, deep media, with the aid of a spatula.  The distal endpoint in the ICA was made with a Barnes blade, with an excellent thin transition being noted. This was  tacked down with several interrupted 7-0 Prolene sutures under loupe magnification.  A good eversion endarterectomy was performed in the external carotid artery.  All loose medial fibers were removed.  We had to extend our arteriotomy more proximally on the common carotid due to a significant calcified plaque, and we were able to transect the plaque just before this with a fairly mild step-off being noted, but diffuse thickness, consistent with the patient's pan-systemic vascular problems (preoperative CTA revealed no significant common carotid artery disease).      External Jugular Vein Patch:  We then harvested the external jugular vein from the neck, ligating this proximally and distally with 2-0 silk suture.  This was gently dilated with 0.5% Marcaine, and noted to be of excellent quality and caliber.  This was everted and left double thickness in the normal direction of flow with no valve leaflets.  This was sewn to the arteriotomy with running 6-0 Prolene suture.  We did excise some of the redundant carotid bulb to make sure we had a very smooth general transition down to the distal ICA with our vein patch.  Prior to completing closure, the shunt  was removed and the vessel was flushed, and blood flow was sequentially allowed to go up to the ICA, with an excellent pulse being noted, good hemostasis, and a very stable EEG.      Due to the patient's Plavix, we had some generalized oozing.  Because of this, I placed a #15 round Luan-Olmedo drain into the wound via a lower neck stab incision and secured it with a 2-0 silk suture.  The neck was closed in layers with interrupted and running 3-0 Vicryl suture, and the skin was closed with 4-0 Monocryl in subcuticular fashion.  The wounds were infiltrated with 0.5% Marcaine for postoperative analgesia.  No Toradol was given due to the hypotensive episode.  Sterile dressing was applied.     Needle and sponge counts correct x 2.  EBL=35 ml     The patient awoke upon the operating table and was extubated.  He did remain hemodynamically stable.  He was neurologically intact in recovery.         TAMIKO CUNNINGHAM MD             D: 2018 19:19   T: 2018 05:13   MT: [id number]      Name:     SID AGUIAR   MRN:      [id number]        Account:        [id number]   :      1928           Procedure Date: 2018      Document: [id number]       cc: Matt Morrissey MD

## 2018-01-04 NOTE — PLAN OF CARE
Problem: Patient Care Overview  Goal: Plan of Care/Patient Progress Review  Outcome: Improving  Arrived on floor @ 2245. VSS ex soft BP. RA. R neck dressing marked, no changes.  ISREAL intact. Neuros intact. Denies pain. Doppler pulses. Due to void. Sat cath in PACU @ 2000.

## 2018-01-04 NOTE — ANESTHESIA PROCEDURE NOTES
CENTRAL LINE INSERTION PROCEDURE NOTE:   Pre-Procedure  Performed by MARIUSZ MARSHALL  Location: pre-op      Pre-Anesthestic Checklist: patient identified, IV checked, site marked, risks and benefits discussed, informed consent, monitors and equipment checked, pre-op evaluation and at physician/surgeon's request    Timeout  Correct Patient: Yes   Correct Procedure: Yes   Correct Site: Yes   Correct Laterality: Yes   Correct Position: Yes   Site Marked: Yes   .   Procedure Documentation   Procedure: central line  Position: Trendelenburg  Patient Prep;all elements of maximal sterile barrier technique followed, mask, hat, sterile gown, sterile gloves, draped, chlorhexidine gluconate and isopropyl alcohol      Insertion Site:left, femoral  Using U/S with sterile probe cover and sterile gel, vein evaluated for patency/adequacy of cortis insertion is adequate, and using realtime U/S imaging the paulina was punctured, and needle was observed entering vein on U/S. A permanent image is entered into the patient's record.   Skin infiltrated with 2 mL of 2% lidocaine.  Catheter: 7 Fr, 20 cm, T.L.  Assessment/Narrative         Secured by suture  Tegaderm and Biopatch dressing used.  blood aspirated from all lumens  All lumens flushed: Yes    Comments:  A time out was preformed identifying the correct procedure, the correct location with the nursing staff.  The right neck was prepped with 2% chlorhexidine and draped with a full length sterile sheet in the usual fashion.  1% lidocaine was administered subcutaneously for local anesthesia.  The left femoral vein was accessed under ultrasound guidance with an 18 gauge thin wall needle, a 1.75 inch catheter was advanced over the needle and the needle was removed. Normal venous pressure was confirmed using a fluid column technique. A wire was then advanced through the catheter and catheter was then removed. A 7 Citizen of [place] triple lumen catheter was advanced over the wire following dilation via  the seldinger technique. Blood was withdrawn from all lumens and flushed with normal saline.  The catheter was sutured in place and a sterile dressing was applied over the site prior to removal of drapes.

## 2018-01-04 NOTE — BRIEF OP NOTE
[facility] Brief Operative Note    Pre-operative diagnosis: Carotid stenosis, symptomatic   Post-operative diagnosis same   Procedure: Procedure(s):  RIGHT CAROTID ENDARTERECTOMY WITH EEG - Wound Class: I-Clean   Surgeon(s): Surgeon(s) and Role:     * Roland Rodriguez MD - Primary     * Chritsie Bowman MD - Assisting   Estimated blood loss: * No values recorded between 1/3/2018  4:50 PM and 1/3/2018  7:34 PM *    Specimens: * No specimens in log *   Findings: Right CEA.

## 2018-01-04 NOTE — ANESTHESIA CARE TRANSFER NOTE
Patient: Dustin B Fligge    Procedure(s):  RIGHT CAROTID ENDARTERECTOMY WITH EEG - Wound Class: I-Clean    Diagnosis: UNKNOWN  Diagnosis Additional Information: No value filed.    Anesthesia Type:   General, ETT     Note:  Airway :Face Mask  Patient transferred to:PACU  Handoff Report: Identifed the Patient, Identified the Reponsible Provider, Reviewed the pertinent medical history, Discussed the surgical course, Reviewed Intra-OP anesthesia mangement and issues during anesthesia, Set expectations for post-procedure period and Allowed opportunity for questions and acknowledgement of understanding      Vitals: (Last set prior to Anesthesia Care Transfer)    CRNA VITALS  1/3/2018 1905 - 1/3/2018 1955      1/3/2018             Pulse: 69    ART BP: 122/42    ART Mean: 71    SpO2: 100 %    Resp Rate (set): 10                Electronically Signed By: ASPEN Her CRNA  January 3, 2018  7:55 PM

## 2018-01-04 NOTE — PROGRESS NOTES
"Vascular Surgery Progress Note    S: Doing well this morning. Has pain in his neck/throat, but does not think he has needed pain medication. Voice feels hoarse this morning. No difficulty swallowing.     O:   /62  Pulse 66  Temp 98.2  F (36.8  C) (Oral)  Resp (!) 34  Ht 1.727 m (5' 8\")  Wt 74.4 kg (164 lb 0.4 oz)  SpO2 94%  BMI 24.94 kg/m2  General: Sitting in bed, in no distress  Neck: Incision intact, surrounding ecchymosis, soft  Drain: Serosanguinous output, 15 cc last shift  Resp: Non labored respirations  CV: Regular rate  Neuro: Alert and oriented, CN intact, strength equal and symmetric  Extremities: Warm, well perfused    A/P:   88 yo M POD#1 s/p right sided carotid endarterectomy, doing well post operatively  PRN pain control  Likely D/C drain today  Appreciate management by hospitialist team      Christie Bowman, PGY-4  [phone]    STAFF:  No complaints   Very comfortable.  Wife is here.                 CN XII and Neuro=A     ISREAL= 25 ml since placed----pulled                 Hx PAD.  Strong biphasic Doppler to left distal AT.                                  Weak Monophasic to right DP/PT                    Doing well.  With PAD/CAD/Cerebral disease I feel chronic Plavix is indicated                                       Had GI bleed over 5 years ago and no problems since.                  Home per Hospitalists.  RTO 2 weeks.  Dulpex in three months of Carotid.       Roland Rodriguez MD   "

## 2018-01-04 NOTE — ANESTHESIA POSTPROCEDURE EVALUATION
Patient: Dustin B Fligge    Procedure(s):  RIGHT CAROTID ENDARTERECTOMY WITH EEG - Wound Class: I-Clean    Diagnosis:UNKNOWN  Diagnosis Additional Information: No value filed.    Anesthesia Type:  General, ETT    Note:  Anesthesia Post Evaluation    Patient location during evaluation: PACU  Patient participation: Able to fully participate in evaluation  Level of consciousness: awake and alert  Pain management: adequate  Airway patency: patent  Cardiovascular status: acceptable, hemodynamically stable and hypotensive  Respiratory status: acceptable and nasal cannula  Hydration status: acceptable  PONV: none     Anesthetic complications: None    Comments: Doing well, relatively hypotensive, but alert and oriented. No pain. No complications        Last vitals:  Vitals:    01/03/18 2120 01/03/18 2130 01/03/18 2140   BP: 93/42 105/40 106/44   Pulse:      Resp: 14 15 14   Temp:   36.2  C (97.1  F)   SpO2: 99%  100%         Electronically Signed By: Mahogany Nice MD  January 3, 2018  9:46 PM

## 2018-01-05 ENCOUNTER — NURSING HOME VISIT (OUTPATIENT)
Dept: GERIATRICS | Facility: CLINIC | Age: 83
End: 2018-01-05
Payer: MEDICARE

## 2018-01-05 ENCOUNTER — RECORDS - HEALTHEAST (OUTPATIENT)
Dept: LAB | Facility: CLINIC | Age: 83
End: 2018-01-05

## 2018-01-05 VITALS
HEART RATE: 77 BPM | SYSTOLIC BLOOD PRESSURE: 117 MMHG | TEMPERATURE: 97.7 F | HEIGHT: 68 IN | RESPIRATION RATE: 18 BRPM | OXYGEN SATURATION: 96 % | BODY MASS INDEX: 24.86 KG/M2 | WEIGHT: 164 LBS | DIASTOLIC BLOOD PRESSURE: 72 MMHG

## 2018-01-05 DIAGNOSIS — I10 BENIGN ESSENTIAL HYPERTENSION: ICD-10-CM

## 2018-01-05 DIAGNOSIS — E87.5 HYPERKALEMIA: ICD-10-CM

## 2018-01-05 DIAGNOSIS — R53.81 PHYSICAL DECONDITIONING: ICD-10-CM

## 2018-01-05 DIAGNOSIS — E11.42 DM TYPE 2 WITH DIABETIC PERIPHERAL NEUROPATHY (H): ICD-10-CM

## 2018-01-05 DIAGNOSIS — Z86.73 HISTORY OF TIA (TRANSIENT ISCHEMIC ATTACK) AND STROKE: ICD-10-CM

## 2018-01-05 DIAGNOSIS — I63.231 STENOSIS OF RIGHT INTERNAL CAROTID ARTERY WITH CEREBRAL INFARCTION (H): Primary | ICD-10-CM

## 2018-01-05 DIAGNOSIS — I48.0 PAROXYSMAL ATRIAL FIBRILLATION (H): ICD-10-CM

## 2018-01-05 DIAGNOSIS — F01.50 VASCULAR DEMENTIA WITHOUT BEHAVIORAL DISTURBANCE (H): ICD-10-CM

## 2018-01-05 DIAGNOSIS — R33.9 URINARY RETENTION: ICD-10-CM

## 2018-01-05 LAB — GLUCOSE BLDC GLUCOMTR-MCNC: 220 MG/DL (ref 70–99)

## 2018-01-05 PROCEDURE — 99310 SBSQ NF CARE HIGH MDM 45: CPT | Performed by: NURSE PRACTITIONER

## 2018-01-05 NOTE — PLAN OF CARE
Problem: Patient Care Overview  Goal: Plan of Care/Patient Progress Review  Physical Therapy Discharge Summary    Reason for therapy discharge:    Discharged to transitional care facility.    Progress towards therapy goal(s). See goals on Care Plan in [facility] electronic health record for goal details.  Goals partially met.  Barriers to achieving goals:   discharge from facility.    Therapy recommendation(s):    Continued therapy is recommended.  Rationale/Recommendations:  TCU to progress balance, safety and independence with mobility.

## 2018-01-05 NOTE — LETTER
1/5/2018        RE: Dustin Pearce  [address]  [facility] [address]        [facility]  PRIMARY CARE PROVIDER AND CLINIC:  Matt Morris[facility] [place] [address] [address] / NABEEL MINAYA*  Chief Complaint   Patient presents with     Hospital F/U       HPI:    Dustin Pearce is a 89 year old  (1/31/1928),admitted to the [facility] from [facility].  Hospital stay 12/31/17 through 1/4/18.  Admitted to this facility for  rehab, medical management and nursing care.  HPI information obtained from: facility chart records, patient and wife.  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  Incision intact with steri strips. Denies any chewing or swallowing problems other than a sore throat that is improving.    He continues on ASA, Statin, Clopidogrel, Norco for pain  History of TIA (transient ischemic attack) and stroke    See above  Paroxysmal atrial fibrillation (H)    Chronic anticoagulation  Vascular dementia without behavioral disturbance    Mild. 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  The following problems were addressed during his hospitalization:      1.  TIA  12/31/17-CT of his head with and without contrast as well as a CTA.  This did not show any acute  change.   -MRI no evidence of acute intracranial pathology, no change from prior study  -CT and MRI showed a large chronic infarct anterolateral aspect of left frontal lobe  -Much improved the following day  and was very jolly, which is back to his usual personality.  -Neurology consulted. Vascular surgery as well consulted, and patient underwent Rt carotid enarterectomy, as the right carotid artery stenosis was felt to be symptomatic  -continued on the asa and plavix, statin. BP control as below.  -discharged to TCU     -The left carotid artery has history of being completely occluded.      2.  Hypertension.  On admission  permissive hypertension per the Neurology stroke orders. BP was under control without his BP meds. PTA Metoprolol resumed at discharge.  -PTA Lisinopril was initially held on admission given hyperkalemia, which resolved, and so it was resumed at discharge given his DM/HTN, as he is on a very low dose. It appears that he had hyperkalemia also earlier at clinic visit. Now that ACEI was resumed at discharge, needs follow up BMP in 3-5 days at TCU, and decide if ACE can be continued ot not.       3.  Hyperkalemia.  The patient had a potassium of 5.9 on 12/01/2017.  -his creatinine remains normal  -Given DM as well, and that his ACEI dose was very small, it was resumed at discharge, with follow up BMP to eval potassium in 3-5 days. If he develops hyperkalemia again, will likely discontinue his ACEI altogether.      4.  History of type 2 diabetes under poor control with an A1c of 8.9.    - his PTA metformin and glipizide held, managed with Lantus, and ISS and Byetta was added. BS still upto 200s. He was discharged on his PTA metformin and glipizide, and Byetta added.      5. Urinary retention:Developed urinary retention, but was able to void subsequently. He did have PVR of 165. Needs to monitor at TCU, and also renal function as outpatient. He normally takes flomax, continued.       6.  History of  chronic left carotid artery stenosis.   7.  History of mild cognitive impairment.   8.  History of paroxysmal atrial fibrillation. The patient is not on anticoagulation. ASA and Plavix as above.  9.  Code status was discussed with the patient's wife, and he is full code.  10. History of macular degeneration of the left eye, cannot see out of the left eye  11.  History of hyperlipidemia. restarted his statin     Gagan Jerome MD  Hospitalist        Significant Results and Procedures      Results attached for     CT head    CTA head and neck    MRI brain    2D ECHO     Also had Rt carotid endarterectomy.     CODE STATUS/ADVANCE DIRECTIVES DISCUSSION:   CPR/Full code   Patient's living condition: lives with spouse    ALLERGIES:Oxycodone; Sulfa drugs; and Tetracycline  PAST MEDICAL HISTORY:  has a past medical history of Aortic root dilatation (H); Aortic stenosis; Benign essential hypertension (1/6/2017); Benign non-nodular prostatic hyperplasia with lower urinary tract symptoms (10/20/2016); CAD (coronary artery disease); Cardiomyopathy (H); Carotid artery stenosis (10/20/2016); Carotid occlusion, left; Coronary artery disease involving native coronary artery of native heart without angina pectoris (10/20/2016); Diabetes mellitus (H); DJD (degenerative joint disease); Gastro-oesophageal reflux disease; Gastroesophageal reflux disease without esophagitis (10/20/2016); Heart attack; Hyperlipidemia; Hypertension; Mild cognitive impairment (10/20/2016); Nephrolithiasis; Osteopenia; PAD (peripheral artery disease) (H); Paroxysmal atrial fibrillation (H); PONV (postoperative nausea and vomiting); RBBB with left anterior fascicular block; and Unspecified cerebral artery occlusion with cerebral infarction. He also has no past medical history of Difficult intubation or Malignant hyperthermia.  PAST SURGICAL HISTORY:  has a past surgical history that includes Cholecystectomy; hernia repair; Abdomen surgery; Laser holmium  lithotripsy ureter(s), insert stent, combined (3/2/2012); rotator cuff repair rt/lt; Esophagoscopy, gastroscopy, duodenoscopy (EGD), combined (N/A, 12/26/2016); UGI ENDOSCOPY W EUS (N/A, 12/29/2016); Amputate foot (Left, 6/4/2017); Incision and drainage foot, combined (Left, 6/6/2017); and Endarterectomy carotid (Right, 1/3/2018).  FAMILY HISTORY: family history includes Breast Cancer in his mother; Heart Failure (age of onset: 81) in his father.  SOCIAL HISTORY:  reports that he quit smoking about 19 years ago. His smoking use included Cigarettes. He has a 30.00 pack-year smoking history. He has never used smokeless tobacco. He reports that he drinks about 4.2 oz of alcohol per week  He reports that he does not use illicit drugs.    Post Discharge Medication Reconciliation Status: discharge medications reconciled, continue medications without change.  Current Outpatient Prescriptions   Medication Sig Dispense Refill     HYDROcodone-acetaminophen (NORCO) 5-325 MG per tablet Take 1 tablet by mouth every 6 hours as needed for moderate to severe pain (PRN for post surgical pain.) 12 tablet 0     exenatide (BYETTA) 5 mcg/0.02mL per dose (60 doses per 1.2 mL prefilled pen) Inject 5 mcg Subcutaneous 2 times daily (before meals) 1 Syringe      cholecalciferol (VITAMIN D3) 95458 UNITS capsule Take 1 capsule (50,000 Units) by mouth once a week 12 capsule 0     LISINOPRIL PO Take 2.5 mg by mouth daily        metoprolol (TOPROL-XL) 25 MG 24 hr tablet Take 0.5 tablets (12.5 mg) by mouth daily       glipiZIDE (GLUCOTROL) 10 MG tablet Take 1 tablet (10 mg) by mouth 2 times daily (before meals) 180 tablet 3     AMITRIPTYLINE HCL PO Take 25 mg by mouth nightly as needed for sleep       ipratropium (ATROVENT) 0.03 % spray Spray 2 sprays into both nostrils every 12 hours       DULoxetine (CYMBALTA) 30 MG EC capsule Take 1 capsule (30 mg) by mouth daily 90 capsule 3     omeprazole (PRILOSEC) 40 MG capsule Take 1 capsule (40 mg) by  "mouth daily Take 30-60 minutes before a meal. 90 capsule 3     tamsulosin (FLOMAX) 0.4 MG capsule Take 1 capsule (0.4 mg) by mouth daily 90 capsule 3     aspirin EC 81 MG EC tablet Take 1 tablet (81 mg) by mouth daily 30 tablet 0     METFORMIN HCL PO Take 1,000 mg by mouth 2 times daily (with meals)       ATORVASTATIN CALCIUM PO Take 80 mg by mouth daily       blood glucose monitoring (NO BRAND SPECIFIED) test strip Use to test blood sugar three times daily or as directed. 300 each 3     order for DME Equipment being ordered: True Matrix Blood Glucose meter. 1 Device 0     Psyllium (METAMUCIL PO) Take 1 packet by mouth daily as needed        Clopidogrel Bisulfate, [id number], (PLAVIX PO) Take 75 mg by mouth daily          ROS:  10 point ROS of systems including Constitutional, Eyes, Respiratory, Cardiovascular, Gastroenterology, Genitourinary, Integumentary, Muscularskeletal, Psychiatric were all negative except for pertinent positives noted in my HPI.    Exam:  /72  Pulse 77  Temp 97.7  F (36.5  C)  Resp 18  Ht 5' 8\" (1.727 m)  Wt 164 lb (74.4 kg)  SpO2 96%  BMI 24.94 kg/m2  GENERAL APPEARANCE:  Alert, cooperative  ENT:  Mouth and posterior oropharynx normal, moist mucous membranes, [place], hearing aids bilaterally  EYES:  EOM, conjunctivae, lids, pupils and irises normal  RESP:  respiratory effort and palpation of chest normal, lungs clear to auscultation , no respiratory distress  CV:  Palpation and auscultation of heart done , irregular rhythm a fib, rate-normal  ABDOMEN:  normal bowel sounds, soft, nontender, no hepatosplenomegaly or other masses, no guarding or rebound  M/S:   Gait and station abnormal wheelchair goal ambulate with walker  Digits and nails abnormal arthritic changes  SKIN:  Inspection of skin and subcutaneous tissue baseline, Palpation of skin and subcutaneous tissue baseline, wound healing well, no signs of infection right carotid  NEURO:   Cranial nerves 2-12 are normal tested and " grossly at patient's baseline, Examination of sensation by touch normal  PSYCH:  oriented X 3, memory impaired , affect and mood normal    Lab/Diagnostic data:  CBC RESULTS:   Recent Labs   Lab Test  01/04/18   0630  01/01/18   0815   WBC  6.9  5.3   RBC  3.31*  3.93*   HGB  10.1*  12.1*   HCT  31.3*  36.5*   MCV  95  93   MCH  30.5  30.8   MCHC  32.3  33.2   RDW  13.1  12.7   PLT  114*  126*       Last Basic Metabolic Panel:  Recent Labs   Lab Test  01/04/18   0950  01/01/18   0815  12/31/17   1425  12/31/17   1130   NA   --   137   --   137   POTASSIUM  4.0  4.3  4.4  5.4*   CHLORIDE   --   105   --   102   ALLISON   --   8.2*   --   9.5   CO2   --   26   --   28   BUN   --   13   --   19   CR  0.70  0.66   --   0.73   GLC   --   156*  193*  222*       Liver Function Studies -   Recent Labs   Lab Test  12/27/16   0650  12/25/16   0002   PROTTOTAL  6.5*  7.1   ALBUMIN  2.8*  3.7   BILITOTAL  0.7  0.6   ALKPHOS  90  58   AST  43  19   ALT  50  21       TSH   Date Value Ref Range Status   12/31/2017 2.16 0.40 - 4.00 mU/L Final   01/20/2017 3.12 0.40 - 4.00 mU/L Final   ]    Lab Results   Component Value Date    A1C 8.6 12/31/2017    A1C 8.9 12/14/2017       ASSESSMENT/PLAN:  (I63.231) 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)  (primary encounter diagnosis)  Comment: recovering from endarterectomy   Plan: pain assessment - Norco  Monitor incision (infection, bleeding) - dressing to be removed in 2 days  Continue Clopidogrel, Atorvastatin, ASA,   blood sugars 4 times daily and blood pressure/ vs BID    (Z86.73) History of TIA (transient ischemic attack) and stroke  Comment: ongoing  Plan: continue ASA    (I48.0) Paroxysmal atrial fibrillation (H)  Comment: ongoing  Plan: see above chronic anticoagulation for stroke prophylaxis  Metoprolol for rate control   BID BP and Heart rate    (F01.50) Vascular dementia without behavioral disturbance  Comment: mild  Plan: OT cognitive testing.  Involve  wife to help with discharge planning    (I10) Benign essential hypertension  Comment: lower readings tolerated due to healing carotid artery  Plan: VS per facility protocol  Continue Lisinopril, Metoprolol.      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   Care conference with patient and family for the progress of rehab and disposition issues will be discussed as planned.   Rehab evaluation and other evaluations including CPT are at rehab logs, to be reviewed separately.     Electronically signed by:  ASPEN Drake CNP                    Sincerely,        ASPEN Drake CNP

## 2018-01-05 NOTE — PROGRESS NOTES
[facility]  PRIMARY CARE PROVIDER AND CLINIC:  Matt Morrissey [facility] [address] / [address]*  Chief Complaint   Patient presents with     Hospital F/U       HPI:    Dustin Pearce is a 89 year old  (1/31/1928),admitted to the [facility] from [facility].  Hospital stay 12/31/17 through 1/4/18.  Admitted to this facility for  rehab, medical management and nursing care.  HPI information obtained from: facility chart records, patient and wife.  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  Incision intact with steri strips. Denies any chewing or swallowing problems other than a sore throat that is improving.    He continues on ASA, Statin, Clopidogrel, Norco for pain  History of TIA (transient ischemic attack) and stroke    See above  Paroxysmal atrial fibrillation (H)    Chronic anticoagulation  Vascular dementia without behavioral disturbance    Mild. 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  The following problems were addressed during his hospitalization:      1.  TIA  12/31/17-CT of his head with and without contrast as well as a CTA.  This did not show any acute change.   -MRI no evidence of acute intracranial pathology, no change from prior study  -CT  and MRI showed a large chronic infarct anterolateral aspect of left frontal lobe  -Much improved the following day  and was very jolly, which is back to his usual personality.  -Neurology consulted. Vascular surgery as well consulted, and patient underwent Rt carotid enarterectomy, as the right carotid artery stenosis was felt to be symptomatic  -continued on the asa and plavix, statin. BP control as below.  -discharged to TCU     -The left carotid artery has history of being completely occluded.      2.  Hypertension.  On admission  permissive hypertension per the Neurology stroke orders. BP was under control without his BP meds. PTA Metoprolol resumed at discharge.  -PTA Lisinopril was initially held on admission given hyperkalemia, which resolved, and so it was resumed at discharge given his DM/HTN, as he is on a very low dose. It appears that he had hyperkalemia also earlier at clinic visit. Now that ACEI was resumed at discharge, needs follow up BMP in 3-5 days at TCU, and decide if ACE can be continued ot not.       3.  Hyperkalemia.  The patient had a potassium of 5.9 on 12/01/2017.  -his creatinine remains normal  -Given DM as well, and that his ACEI dose was very small, it was resumed at discharge, with follow up BMP to eval potassium in 3-5 days. If he develops hyperkalemia again, will likely discontinue his ACEI altogether.      4.  History of type 2 diabetes under poor control with an A1c of 8.9.    - his PTA metformin and glipizide held, managed with Lantus, and ISS and Byetta was added. BS still upto 200s. He was discharged on his PTA metformin and glipizide, and Byetta added.      5. Urinary retention:Developed urinary retention, but was able to void subsequently. He did have PVR of 165. Needs to monitor at TCU, and also renal function as outpatient. He normally takes flomax, continued.       6.  History of chronic left carotid artery stenosis.   7.  History of mild cognitive impairment.   8.  History  of paroxysmal atrial fibrillation. The patient is not on anticoagulation. ASA and Plavix as above.  9.  Code status was discussed with the patient's wife, and he is full code.  10. History of macular degeneration of the left eye, cannot see out of the left eye  11.  History of hyperlipidemia. restarted his statin     Gagan Jerome MD  Hospitalist        Significant Results and Procedures      Results attached for     CT head    CTA head and neck    MRI brain    2D ECHO     Also had Rt carotid endarterectomy.     CODE STATUS/ADVANCE DIRECTIVES DISCUSSION:   CPR/Full code   Patient's living condition: lives with spouse    ALLERGIES:Oxycodone; Sulfa drugs; and Tetracycline  PAST MEDICAL HISTORY:  has a past medical history of Aortic root dilatation (H); Aortic stenosis; Benign essential hypertension (1/6/2017); Benign non-nodular prostatic hyperplasia with lower urinary tract symptoms (10/20/2016); CAD (coronary artery disease); Cardiomyopathy (H); Carotid artery stenosis (10/20/2016); Carotid occlusion, left; Coronary artery disease involving native coronary artery of native heart without angina pectoris (10/20/2016); Diabetes mellitus (H); DJD (degenerative joint disease); Gastro-oesophageal reflux disease; Gastroesophageal reflux disease without esophagitis (10/20/2016); Heart attack; Hyperlipidemia; Hypertension; Mild cognitive impairment (10/20/2016); Nephrolithiasis; Osteopenia; PAD (peripheral artery disease) (H); Paroxysmal atrial fibrillation (H); PONV (postoperative nausea and vomiting); RBBB with left anterior fascicular block; and Unspecified cerebral artery occlusion with cerebral infarction. He also has no past medical history of Difficult intubation or Malignant hyperthermia.  PAST SURGICAL HISTORY:  has a past surgical history that includes Cholecystectomy; hernia repair; Abdomen surgery; Laser holmium lithotripsy ureter(s), insert stent, combined (3/2/2012); rotator cuff repair rt/lt;  Esophagoscopy, gastroscopy, duodenoscopy (EGD), combined (N/A, 12/26/2016); UGI ENDOSCOPY W EUS (N/A, 12/29/2016); Amputate foot (Left, 6/4/2017); Incision and drainage foot, combined (Left, 6/6/2017); and Endarterectomy carotid (Right, 1/3/2018).  FAMILY HISTORY: family history includes Breast Cancer in his mother; Heart Failure (age of onset: 81) in his father.  SOCIAL HISTORY:  reports that he quit smoking about 19 years ago. His smoking use included Cigarettes. He has a 30.00 pack-year smoking history. He has never used smokeless tobacco. He reports that he drinks about 4.2 oz of alcohol per week  He reports that he does not use illicit drugs.    Post Discharge Medication Reconciliation Status: discharge medications reconciled, continue medications without change.  Current Outpatient Prescriptions   Medication Sig Dispense Refill     HYDROcodone-acetaminophen (NORCO) 5-325 MG per tablet Take 1 tablet by mouth every 6 hours as needed for moderate to severe pain (PRN for post surgical pain.) 12 tablet 0     exenatide (BYETTA) 5 mcg/0.02mL per dose (60 doses per 1.2 mL prefilled pen) Inject 5 mcg Subcutaneous 2 times daily (before meals) 1 Syringe      cholecalciferol (VITAMIN D3) 97999 UNITS capsule Take 1 capsule (50,000 Units) by mouth once a week 12 capsule 0     LISINOPRIL PO Take 2.5 mg by mouth daily        metoprolol (TOPROL-XL) 25 MG 24 hr tablet Take 0.5 tablets (12.5 mg) by mouth daily       glipiZIDE (GLUCOTROL) 10 MG tablet Take 1 tablet (10 mg) by mouth 2 times daily (before meals) 180 tablet 3     AMITRIPTYLINE HCL PO Take 25 mg by mouth nightly as needed for sleep       ipratropium (ATROVENT) 0.03 % spray Spray 2 sprays into both nostrils every 12 hours       DULoxetine (CYMBALTA) 30 MG EC capsule Take 1 capsule (30 mg) by mouth daily 90 capsule 3     omeprazole (PRILOSEC) 40 MG capsule Take 1 capsule (40 mg) by mouth daily Take 30-60 minutes before a meal. 90 capsule 3     tamsulosin (FLOMAX) 0.4  "MG capsule Take 1 capsule (0.4 mg) by mouth daily 90 capsule 3     aspirin EC 81 MG EC tablet Take 1 tablet (81 mg) by mouth daily 30 tablet 0     METFORMIN HCL PO Take 1,000 mg by mouth 2 times daily (with meals)       ATORVASTATIN CALCIUM PO Take 80 mg by mouth daily       blood glucose monitoring (NO BRAND SPECIFIED) test strip Use to test blood sugar three times daily or as directed. 300 each 3     order for DME Equipment being ordered: True Matrix Blood Glucose meter. 1 Device 0     Psyllium (METAMUCIL PO) Take 1 packet by mouth daily as needed        Clopidogrel Bisulfate, [id number], (PLAVIX PO) Take 75 mg by mouth daily          ROS:  10 point ROS of systems including Constitutional, Eyes, Respiratory, Cardiovascular, Gastroenterology, Genitourinary, Integumentary, Muscularskeletal, Psychiatric were all negative except for pertinent positives noted in my HPI.    Exam:  /72  Pulse 77  Temp 97.7  F (36.5  C)  Resp 18  Ht 5' 8\" (1.727 m)  Wt 164 lb (74.4 kg)  SpO2 96%  BMI 24.94 kg/m2  GENERAL APPEARANCE:  Alert, cooperative  ENT:  Mouth and posterior oropharynx normal, moist mucous membranes, [place], hearing aids bilaterally  EYES:  EOM, conjunctivae, lids, pupils and irises normal  RESP:  respiratory effort and palpation of chest normal, lungs clear to auscultation , no respiratory distress  CV:  Palpation and auscultation of heart done , irregular rhythm a fib, rate-normal  ABDOMEN:  normal bowel sounds, soft, nontender, no hepatosplenomegaly or other masses, no guarding or rebound  M/S:   Gait and station abnormal wheelchair goal ambulate with walker  Digits and nails abnormal arthritic changes  SKIN:  Inspection of skin and subcutaneous tissue baseline, Palpation of skin and subcutaneous tissue baseline, wound healing well, no signs of infection right carotid  NEURO:   Cranial nerves 2-12 are normal tested and grossly at patient's baseline, Examination of sensation by touch normal  PSYCH:  " oriented X 3, memory impaired , affect and mood normal    Lab/Diagnostic data:  CBC RESULTS:   Recent Labs   Lab Test  01/04/18   0630  01/01/18   0815   WBC  6.9  5.3   RBC  3.31*  3.93*   HGB  10.1*  12.1*   HCT  31.3*  36.5*   MCV  95  93   MCH  30.5  30.8   MCHC  32.3  33.2   RDW  13.1  12.7   PLT  114*  126*       Last Basic Metabolic Panel:  Recent Labs   Lab Test  01/04/18   0950  01/01/18   0815  12/31/17   1425  12/31/17   1130   NA   --   137   --   137   POTASSIUM  4.0  4.3  4.4  5.4*   CHLORIDE   --   105   --   102   ALLISON   --   8.2*   --   9.5   CO2   --   26   --   28   BUN   --   13   --   19   CR  0.70  0.66   --   0.73   GLC   --   156*  193*  222*       Liver Function Studies -   Recent Labs   Lab Test  12/27/16   0650  12/25/16   0002   PROTTOTAL  6.5*  7.1   ALBUMIN  2.8*  3.7   BILITOTAL  0.7  0.6   ALKPHOS  90  58   AST  43  19   ALT  50  21       TSH   Date Value Ref Range Status   12/31/2017 2.16 0.40 - 4.00 mU/L Final   01/20/2017 3.12 0.40 - 4.00 mU/L Final   ]    Lab Results   Component Value Date    A1C 8.6 12/31/2017    A1C 8.9 12/14/2017       ASSESSMENT/PLAN:  (I63.231) 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)  (primary encounter diagnosis)  Comment: recovering from endarterectomy   Plan: pain assessment - Norco  Monitor incision (infection, bleeding) - dressing to be removed in 2 days  Continue Clopidogrel, Atorvastatin, ASA,   blood sugars 4 times daily and blood pressure/ vs BID    (Z86.73) History of TIA (transient ischemic attack) and stroke  Comment: ongoing  Plan: continue ASA    (I48.0) Paroxysmal atrial fibrillation (H)  Comment: ongoing  Plan: see above chronic anticoagulation for stroke prophylaxis  Metoprolol for rate control   BID BP and Heart rate    (F01.50) Vascular dementia without behavioral disturbance  Comment: mild  Plan: OT cognitive testing.  Involve wife to help with discharge planning    (I10) Benign essential  hypertension  Comment: lower readings tolerated due to healing carotid artery  Plan: VS per facility protocol  Continue Lisinopril, Metoprolol.      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   Care conference with patient and family for the progress of rehab and disposition issues will be discussed as planned.   Rehab evaluation and other evaluations including CPT are at rehab logs, to be reviewed separately.     Electronically signed by:  ASPEN Drake CNP

## 2018-01-06 ENCOUNTER — TELEPHONE (OUTPATIENT)
Dept: FAMILY MEDICINE | Facility: CLINIC | Age: 83
End: 2018-01-06

## 2018-01-06 DIAGNOSIS — I71.40 ABDOMINAL AORTIC ANEURYSM (AAA) WITHOUT RUPTURE (H): Primary | Chronic | ICD-10-CM

## 2018-01-06 NOTE — PLAN OF CARE
Problem: Patient Care Overview  Goal: Plan of Care/Patient Progress Review  Occupational Therapy Discharge Summary    Reason for therapy discharge:    Discharged to transitional care facility.    Progress towards therapy goal(s). See goals on Care Plan in [facility] electronic health record for goal details.  Goals not met.  Barriers to achieving goals:   discharge from facility.    Therapy recommendation(s):    Continued therapy is recommended.  Rationale/Recommendations:  eval and treat at TCU to maximize safety and indep in all ADLs, IADLs and mobility tasks. .

## 2018-01-06 NOTE — TELEPHONE ENCOUNTER
Patient is due for annual surveillance ultrasound for his known AAA, please help him schedule this

## 2018-01-08 ENCOUNTER — CARE COORDINATION (OUTPATIENT)
Dept: CARE COORDINATION | Facility: CLINIC | Age: 83
End: 2018-01-08

## 2018-01-08 ENCOUNTER — TRANSFERRED RECORDS (OUTPATIENT)
Dept: HEALTH INFORMATION MANAGEMENT | Facility: CLINIC | Age: 83
End: 2018-01-08

## 2018-01-08 LAB
ANION GAP SERPL CALCULATED.3IONS-SCNC: 11 MMOL/L (ref 5–18)
ANION GAP SERPL CALCULATED.3IONS-SCNC: 11 MMOL/L (ref 5–18)
BUN SERPL-MCNC: 9 MG/DL (ref 8–28)
BUN SERPL-MCNC: 9 MG/DL (ref 8–28)
CALCIUM SERPL-MCNC: 9.3 MG/DL (ref 8.5–10.5)
CALCIUM SERPL-MCNC: 9.3 MG/DL (ref 8.5–10.5)
CHLORIDE BLD-SCNC: 103 MMOL/L (ref 98–107)
CHLORIDE SERPLBLD-SCNC: 103 MMOL/L (ref 98–107)
CO2 SERPL-SCNC: 26 MMOL/L (ref 22–31)
CO2 SERPL-SCNC: 26 MMOL/L (ref 22–31)
CREAT SERPL-MCNC: 0.72 MG/DL (ref 0.7–1.3)
CREAT SERPL-MCNC: 0.72 MG/DL (ref 0.7–1.3)
ERYTHROCYTE [DISTWIDTH] IN BLOOD BY AUTOMATED COUNT: 13 % (ref 11–14.5)
ERYTHROCYTE [DISTWIDTH] IN BLOOD BY AUTOMATED COUNT: 13 % (ref 11–14.5)
GFR SERPL CREATININE-BSD FRML MDRD: >60 ML/MIN/1.73M2
GFR SERPL CREATININE-BSD FRML MDRD: >60 ML/MIN/1.73M2
GLUCOSE BLD-MCNC: 179 MG/DL (ref 70–125)
GLUCOSE SERPL-MCNC: 179 MG/DL (ref 70–125)
HCT VFR BLD AUTO: 36.3 % (ref 40–54)
HCT VFR BLD AUTO: 36.3 % (ref 40–54)
HEMOGLOBIN: 11.6 G/DL (ref 14–18)
HGB BLD-MCNC: 11.6 G/DL (ref 14–18)
MAGNESIUM SERPL-MCNC: 1 MG/DL (ref 1.8–2.6)
MCH RBC QN AUTO: 30.6 PG (ref 27–34)
MCH RBC QN AUTO: 30.6 PG (ref 27–34)
MCHC RBC AUTO-ENTMCNC: 32 G/DL (ref 32–36)
MCHC RBC AUTO-ENTMCNC: 32 G/DL (ref 32–36)
MCV RBC AUTO: 96 FL (ref 80–100)
MCV RBC AUTO: 96 FL (ref 80–100)
PLATELET # BLD AUTO: 199 THOU/UL (ref 140–440)
PLATELET # BLD AUTO: 199 THOU/UL (ref 140–440)
PMV BLD AUTO: 11 FL (ref 8.5–12.5)
POTASSIUM BLD-SCNC: 3.4 MMOL/L (ref 3.5–5)
POTASSIUM SERPL-SCNC: 3.4 MMOL/L (ref 3.5–5)
RBC # BLD AUTO: 3.79 MILL/UL (ref 4.4–6.2)
RBC # BLD AUTO: 3.79 MILL/UL (ref 4.4–6.2)
SODIUM SERPL-SCNC: 140 MMOL/L (ref 136–145)
SODIUM SERPL-SCNC: 140 MMOL/L (ref 136–145)
WBC # BLD AUTO: 8.4 THOU/UL (ref 4–11)
WBC: 8.4 THOU/UL (ref 4–11)

## 2018-01-08 NOTE — TELEPHONE ENCOUNTER
No ans. Left message to call clinic or call [facility] to sched the annual Abd ultrasound.    I see he has a Carotid ultrasound scheduled on 2-1-18

## 2018-01-08 NOTE — PROGRESS NOTES
Clinic Care Coordination Contact  Care Coordination Transition Communication    Referral Source: CTS    Clinical Data: Patient was hospitalized at 12/31/17-01/04/18 with diagnosis of TIA.     Transition to Facility:              Facility Name: [facility]              Contact name and phone number/fax: Marian BarakatHUBER [phone]    Plan:   Patient was scheduled on 2/1/18 for carotid US, added to schedule AAA abdominal screening to be done on same date.   RN/SW Care Coordinator will await notification from facility staff informing RN/SW Care Coordinator of patient's discharge plans/needs. RN/SW Care Coordinator will review chart and outreach to facility staff every 4 weeks and as needed.     FERNANDO ChavezN, RN, PHN  Clinic Care Coordinator  [facility]  [phone]

## 2018-01-09 ENCOUNTER — TELEPHONE (OUTPATIENT)
Dept: OTHER | Facility: CLINIC | Age: 83
End: 2018-01-09

## 2018-01-09 NOTE — TELEPHONE ENCOUNTER
Clinic Care Coordination Contact  Care Team Conversations    Contacted [facility], where patient is already scheduled for a Carotid US, and requested AAA screening be added to the same appt.   LVM for [facility] . Requested call back if unable to accommodate.  Notified TCU SW of appt on 2/1/18, requested call if pt is not to be d/c'd in time for imaging.   Elvira Guerra, FERNANDON, RN, PHN  Clinic Care Coordinator  [facility]  [phone]

## 2018-01-09 NOTE — TELEPHONE ENCOUNTER
[phone] and [facility] to [place]   And left message for  Marian to call clinic.

## 2018-01-09 NOTE — TELEPHONE ENCOUNTER
Ronda, did this get communicated to [facility] and will they call to schedule the Abd Ultrasound?   Sruthi OLIVARES MA

## 2018-01-10 ENCOUNTER — NURSING HOME VISIT (OUTPATIENT)
Dept: GERIATRICS | Facility: CLINIC | Age: 83
End: 2018-01-10
Payer: MEDICARE

## 2018-01-10 ENCOUNTER — TELEPHONE (OUTPATIENT)
Dept: OTHER | Facility: CLINIC | Age: 83
End: 2018-01-10

## 2018-01-10 ENCOUNTER — RECORDS - HEALTHEAST (OUTPATIENT)
Dept: LAB | Facility: CLINIC | Age: 83
End: 2018-01-10

## 2018-01-10 DIAGNOSIS — Z98.890 S/P CAROTID ENDARTERECTOMY: ICD-10-CM

## 2018-01-10 DIAGNOSIS — E11.42 DM TYPE 2 WITH DIABETIC PERIPHERAL NEUROPATHY (H): ICD-10-CM

## 2018-01-10 DIAGNOSIS — I48.0 PAROXYSMAL ATRIAL FIBRILLATION (H): ICD-10-CM

## 2018-01-10 DIAGNOSIS — I10 ESSENTIAL HYPERTENSION: ICD-10-CM

## 2018-01-10 DIAGNOSIS — R53.81 PHYSICAL DECONDITIONING: Primary | ICD-10-CM

## 2018-01-10 DIAGNOSIS — I65.21 STENOSIS OF RIGHT CAROTID ARTERY: ICD-10-CM

## 2018-01-10 DIAGNOSIS — Z86.73 HISTORY OF CVA (CEREBROVASCULAR ACCIDENT): ICD-10-CM

## 2018-01-10 DIAGNOSIS — G45.9 TRANSIENT CEREBRAL ISCHEMIA, UNSPECIFIED TYPE: ICD-10-CM

## 2018-01-10 PROCEDURE — 99305 1ST NF CARE MODERATE MDM 35: CPT | Performed by: INTERNAL MEDICINE

## 2018-01-10 NOTE — TELEPHONE ENCOUNTER
Received a call from VAN Hicks-care coordinator at Dr. Morrissey's office asking if we could coordinate for Dustin to have a AAA screening when he is here for a carotid ultrasound on 02/01/18.

## 2018-01-10 NOTE — MR AVS SNAPSHOT
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  [place] [address]   [phone]            Feb 01, 2018  2:45 PM CST   US CAROTID BILATERAL with SHVUS2   [facility] ([facility])    [address]  [place] [address]   [phone]           Please bring a list of your medicines (including vitamins, minerals and over-the-counter drugs). Also, tell your doctor about any allergies you may have. Wear comfortable clothes and leave your valuables at home.  You do not need to do anything special to prepare for your exam.  Please call the Imaging Department at your exam site with any questions.            Feb 01, 2018  3:30 PM CST   Return Visit with Roland Rodriguez MD   [facility] ([facility])    [address]  [facility] [address]   [phone]              Who to contact     If you have questions or need follow up information about today's clinic visit or your schedule please contact  "[facility] directly at [phone].  Normal or non-critical lab and imaging results will be communicated to you by MyChart, letter or phone within 4 business days after the clinic has received the results. If you do not hear from us within 7 days, please contact the clinic through SWYFhart or phone. If you have a critical or abnormal lab result, we will notify you by phone as soon as possible.  Submit refill requests through DZZOM or call your pharmacy and they will forward the refill request to us. Please allow 3 business days for your refill to be completed.          Additional Information About Your Visit        SWYFharSpeechTrans Information     DZZOM lets you send messages to your doctor, view your test results, renew your prescriptions, schedule appointments and more. To sign up, go to [email or web address] . Click on \"Log in\" on the left side of the screen, which will take you to the Welcome page. Then click on \"Sign up Now\" on the right side of the page.     You will be asked to enter the access code listed below, as well as some personal information. Please follow the directions to create your username and password.     Your access code is: [email or web address]  Expires: 2018 11:54 AM     Your access code will  in 90 days. If you need help or a new code, please call your [facility] clinic or [phone].        Care EveryWhere ID     This is your Care EveryWhere ID. This could be used by other organizations to access your [facility] medical records  [id number]        Your Vitals Were     Pulse Temperature Respirations Height Pulse Oximetry BMI (Body Mass Index)    75 97.7  F (36.5  C) 16 5' 6\" (1.676 m) 96% 27.21 kg/m2       Blood Pressure from Last 3 Encounters:   18 123/63   18 123/63   18 117/72    Weight from Last 3 Encounters:   18 168 lb 9.6 oz (76.5 kg)   18 168 lb 9.6 oz (76.5 kg)   18 164 lb (74.4 kg)              Today, you had the following     No " orders found for display       Primary Care Provider Office Phone # Fax #    Matt oMrrissey [phone]       [place] [address] [address]        Equal Access to Services     LAURENCE JACOBSON : Hadii aad ku hadmoriso Soomaali, waaxda luqadaha, qaybta kaalmada adeegyada, waxalicia begumn floresitahelena abreu natan . So [facility] [phone].    ATENCIÓN: Si habla español, tiene a garvey disposición servicios gratuitos de asistencia lingüística. Llame al [phone].    We comply with applicable federal civil rights laws and [place] laws. We do not discriminate on the basis of race, color, national origin, age, disability, sex, sexual orientation, or gender identity.            Thank you!     Thank you for choosing [facility]  for your care. Our goal is always to provide you with excellent care. Hearing back from our patients is one way we can continue to improve our services. Please take a few minutes to complete the written survey that you may receive in the mail after your visit with us. Thank you!             Your Updated Medication List - Protect others around you: Learn how to safely use, store and throw away your medicines at www.disposemymeds.org.          This list is accurate as of: 1/10/18 11:59 PM.  Always use your most recent med list.                   Brand Name Dispense Instructions for use Diagnosis    ACETAMINOPHEN PO      Take 650 mg by mouth as needed for pain Give 1 tablet by mouth as needed for pain not to exceed 3000mg per day.        AMITRIPTYLINE HCL PO      Take 25 mg by mouth nightly as needed for sleep        aspirin 81 MG EC tablet     30 tablet    Take 1 tablet (81 mg) by mouth daily    Transient cerebral ischemia, unspecified type       ATORVASTATIN CALCIUM PO      Take 80 mg by mouth daily        blood glucose monitoring test strip    no brand specified    300 each    Use to test blood sugar three times daily or as directed.     Hypoglycemia       cholecalciferol 90588 UNITS capsule    VITAMIN D3    12 capsule    Take 1 capsule (50,000 Units) by mouth once a week    Vitamin D deficiency       DULoxetine 30 MG EC capsule    CYMBALTA    90 capsule    Take 1 capsule (30 mg) by mouth daily    Polyneuropathy associated with underlying disease (H)       exenatide 5 MCG/0.02ML Sopn injection    BYETTA    1 Syringe    Inject 5 mcg Subcutaneous 2 times daily (before meals)    Type 2 diabetes mellitus with other specified complication, without long-term current use of insulin (H)       glipiZIDE 10 MG tablet    GLUCOTROL    180 tablet    Take 1 tablet (10 mg) by mouth 2 times daily (before meals)    Type 2 diabetes mellitus with diabetic peripheral angiopathy without gangrene, without long-term current use of insulin (H)       HYDROcodone-acetaminophen 5-325 MG per tablet    NORCO     Take 1 tablet by mouth every 6 hours as needed for moderate to severe pain Give 1 tablet by mouth every 6 hours as needed for moderate to severe pain of the neck        ipratropium 0.03 % spray    ATROVENT     Spray 2 sprays into both nostrils every 12 hours        LISINOPRIL PO      Take 2.5 mg by mouth daily        MAGNESIUM OXIDE PO      Take 400 mg by mouth 2 times daily        METAMUCIL PO      Take 1 packet by mouth daily as needed        METFORMIN HCL PO      Take 1,000 mg by mouth 2 times daily (with meals)        metoprolol 25 MG 24 hr tablet    TOPROL-XL     Take 0.5 tablets (12.5 mg) by mouth daily    Cardiomyopathy, unspecified type (H)       omeprazole 40 MG capsule    priLOSEC    90 capsule    Take 1 capsule (40 mg) by mouth daily Take 30-60 minutes before a meal.    Other acute gastritis without hemorrhage       order for [facility]     1 Device    Equipment being ordered: True Matrix Blood Glucose meter.    Type 2 diabetes mellitus without complication (H)       PLAVIX PO      Take 75 mg by mouth daily    Cough       tamsulosin 0.4 MG capsule    FLOMAX    90  capsule    Take 1 capsule (0.4 mg) by mouth daily    Benign non-nodular prostatic hyperplasia with lower urinary tract symptoms

## 2018-01-11 ENCOUNTER — DISCHARGE SUMMARY NURSING HOME (OUTPATIENT)
Dept: GERIATRICS | Facility: CLINIC | Age: 83
End: 2018-01-11
Payer: MEDICARE

## 2018-01-11 ENCOUNTER — TRANSFERRED RECORDS (OUTPATIENT)
Dept: HEALTH INFORMATION MANAGEMENT | Facility: CLINIC | Age: 83
End: 2018-01-11

## 2018-01-11 VITALS
DIASTOLIC BLOOD PRESSURE: 63 MMHG | SYSTOLIC BLOOD PRESSURE: 123 MMHG | OXYGEN SATURATION: 96 % | HEART RATE: 75 BPM | RESPIRATION RATE: 16 BRPM | WEIGHT: 168.6 LBS | HEIGHT: 66 IN | BODY MASS INDEX: 27.1 KG/M2 | TEMPERATURE: 97.7 F

## 2018-01-11 VITALS
OXYGEN SATURATION: 96 % | RESPIRATION RATE: 16 BRPM | HEART RATE: 75 BPM | TEMPERATURE: 97.7 F | HEIGHT: 66 IN | BODY MASS INDEX: 27.1 KG/M2 | DIASTOLIC BLOOD PRESSURE: 63 MMHG | SYSTOLIC BLOOD PRESSURE: 123 MMHG | WEIGHT: 168.6 LBS

## 2018-01-11 DIAGNOSIS — F01.50 VASCULAR DEMENTIA WITHOUT BEHAVIORAL DISTURBANCE (H): ICD-10-CM

## 2018-01-11 DIAGNOSIS — E11.42 DM TYPE 2 WITH DIABETIC PERIPHERAL NEUROPATHY (H): ICD-10-CM

## 2018-01-11 DIAGNOSIS — Z86.73 HISTORY OF TIA (TRANSIENT ISCHEMIC ATTACK) AND STROKE: ICD-10-CM

## 2018-01-11 DIAGNOSIS — I10 BENIGN ESSENTIAL HYPERTENSION: ICD-10-CM

## 2018-01-11 DIAGNOSIS — I65.21 STENOSIS OF RIGHT CAROTID ARTERY: Primary | ICD-10-CM

## 2018-01-11 DIAGNOSIS — I48.0 PAROXYSMAL ATRIAL FIBRILLATION (H): ICD-10-CM

## 2018-01-11 DIAGNOSIS — R53.81 PHYSICAL DECONDITIONING: ICD-10-CM

## 2018-01-11 DIAGNOSIS — E87.5 HYPERKALEMIA: ICD-10-CM

## 2018-01-11 LAB — MAGNESIUM SERPL-MCNC: 0.9 MG/DL (ref 1.8–2.6)

## 2018-01-11 PROCEDURE — 99316 NF DSCHRG MGMT 30 MIN+: CPT | Performed by: NURSE PRACTITIONER

## 2018-01-11 RX ORDER — POLYETHYLENE GLYCOL 3350 17 G/17G
17 POWDER, FOR SOLUTION ORAL DAILY
COMMUNITY
Start: 2018-01-11 | End: 2020-01-01

## 2018-01-11 RX ORDER — HYDROCODONE BITARTRATE AND ACETAMINOPHEN 5; 325 MG/1; MG/1
1 TABLET ORAL EVERY 6 HOURS PRN
COMMUNITY
End: 2018-01-11

## 2018-01-11 NOTE — PATIENT INSTRUCTIONS
[facility] Discharge Orders    Name: Dustin Pearce  : 1928  Planned Discharge Date: 18  Discharged to: previous independent home    MEDICAL FOLLOW UP  Follow up with PCP in 1-2 weeks pt to call for appt  Follow up with Specialists see below  Next 5 appointments (look out 90 days)     2018  3:30 PM CST   Return Visit with Roland Rodriguez MD   [facility] ([facility])    [address]  [place] [address] [phone]                  FUTURE LABS: No labs orders/due    ORDER CHANGES:  Cont all current meds    DISCHARGE MEDICATIONS:  The patient s pharmacy is authorized to dispense a 30-day supply of medications. Refill requests should be directed to the primary provider, Matt Morrissey .   No narcotics are prescribed at time of discharge.   Current Outpatient Prescriptions   Medication Sig Dispense Refill     MAGNESIUM OXIDE PO Take 400 mg by mouth 2 times daily       ACETAMINOPHEN PO Take 650 mg by mouth as needed for pain Give 1 tablet by  mouth as needed  for pain not to  exceed 3000mg per  day.       polyethylene glycol (MIRALAX/GLYCOLAX) Packet Take 17 g by mouth daily as needed for constipation       exenatide (BYETTA) 5 mcg/0.02mL per dose (60 doses per 1.2 mL prefilled pen) Inject 5 mcg Subcutaneous 2 times daily (before meals) 1 Syringe      cholecalciferol (VITAMIN D3) 45752 UNITS capsule Take 1 capsule (50,000 Units) by mouth once a week 12 capsule 0     LISINOPRIL PO Take 2.5 mg by mouth daily        metoprolol (TOPROL-XL) 25 MG 24 hr tablet Take 0.5 tablets (12.5 mg) by mouth daily       glipiZIDE (GLUCOTROL) 10 MG tablet Take 1 tablet (10 mg) by mouth 2 times daily (before meals) 180 tablet 3     AMITRIPTYLINE HCL PO Take 25 mg by mouth nightly as needed for sleep       ipratropium (ATROVENT) 0.03 % spray Spray 2 sprays into both nostrils every 12 hours       omeprazole  (PRILOSEC) 40 MG capsule Take 1 capsule (40 mg) by mouth daily Take 30-60 minutes before a meal. 90 capsule 3     tamsulosin (FLOMAX) 0.4 MG capsule Take 1 capsule (0.4 mg) by mouth daily 90 capsule 3     aspirin EC 81 MG EC tablet Take 1 tablet (81 mg) by mouth daily 30 tablet 0     METFORMIN HCL PO Take 1,000 mg by mouth 2 times daily (with meals)       ATORVASTATIN CALCIUM PO Take 80 mg by mouth daily       blood glucose monitoring (NO BRAND SPECIFIED) test strip Use to test blood sugar three times daily or as directed. 300 each 3     order for DME Equipment being ordered: True Matrix Blood Glucose meter. 1 Device 0     Clopidogrel Bisulfate, [id number], (PLAVIX PO) Take 75 mg by mouth daily        DULoxetine (CYMBALTA) 30 MG EC capsule Take 1 capsule (30 mg) by mouth daily 90 capsule 3       SERVICES:  Home Care:  Physical Therapy    ADDITIONAL INSTRUCTIONS:  None except lifting restrictions of < 20# until you see the surgeon for F/U    ASPEN Montes CNP  This document was electronically signed on January 11, 2018

## 2018-01-11 NOTE — PROGRESS NOTES
"[facility] DISCHARGE SUMMARY    PATIENT'S NAME: Dustin Pearce  YOB: 1928    PRIMARY CARE PROVIDER AND CLINIC RESPONSIBLE AFTER TRANSFER: Matt Morrissey     CODE STATUS: Full Code    TRANSFERRING PROVIDERS: ASPEN Montes CNP, Dr Jose Guadalupe Rodriguez MD,      DATE OF SNF ADMISSION:  January / 04 / 2018    DATE OF SNF DISCHARGE (including anticipating DC): January / 14 / 2018    DISCHARGE DISPOSITION: [facility] Provider    Nursing Facility: [facility] stay 12/3/17 to 1/4/18.     Condition on Discharge:  Improving.    Function:  Ambulates 250' w/ FWW. Mod 1 for transfers.   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  The following problems were addressed during his hospitalization:      1.  TIA  12/31/17-CT of his head with and without contrast as well as a CTA.  This did not show any acute change.   -MRI no evidence of acute intracranial pathology, no change from prior study  -CT and MRI showed a large chronic infarct anterolateral aspect of left frontal lobe  -Much improved the following day  and was very jolly, which is back to his usual personality.  -Neurology consulted. Vascular surgery as well consulted, and patient underwent Rt carotid endarterectomy, as the right carotid artery stenosis was felt to be symptomatic  -continued on the asa and Plavix, statin. BP control as below.  -discharged to TCU      -The left carotid artery has history of being completely occluded.      2.  Hypertension.  On " "admission  permissive hypertension per the Neurology stroke orders. BP was under control without his BP meds. PTA Metoprolol resumed at discharge.  -PTA Lisinopril was initially held on admission given hyperkalemia, which resolved, and so it was resumed at discharge given his DM/HTN, as he is on a very low dose. It appears that he had hyperkalemia also earlier at clinic visit. Now that ACEI was resumed at discharge, needs follow up BMP in 3-5 days at TCU, and decide if ACE can be continued to not.       3.  Hyperkalemia.  The patient had a potassium of 5.9 on 12/01/2017.  -his creatinine remains normal  -Given DM as well, and that his ACEI dose was very small, it was resumed at discharge, with follow up BMP to eval potassium in 3-5 days. If he develops hyperkalemia again, will likely discontinue his ACEI altogether.      4.  History of type 2 diabetes under poor control with an A1c of 8.9.    - his PTA metformin and glipizide held, managed with Lantus, and ISS and Byetta was added. BS still up to 200's. He was discharged on his PTA metformin and glipizide, and Byetta added.       5. Urinary retention:Developed urinary retention, but was able to void subsequently. He did have PVR of 165. Needs to monitor at TCU, and also renal function as outpatient. He normally takes Flomax, continued.        6.  History of chronic left carotid artery stenosis.   7.  History of mild cognitive impairment.   8.  History of paroxysmal atrial fibrillation. The patient is not on anticoagulation. ASA and Plavix as above.  9.  Code status was discussed with the patient's wife, and he is full code.  10. History of macular degeneration of the left eye, cannot see out of the left eye  11.  History of hyperlipidemia. restarted his statin\"       No CP, SOB, Cough, dizziness, fevers, chills, HA, N/V, dysuria. Appetite is good.  No pain. Feels constipated still and thinks metamucil not working    BP Readings from Last 3 Encounters:   01/11/18 " 123/63   01/11/18 123/63   01/05/18 117/72       PAST MEDICAL HISTORY:  Past Medical History:   Diagnosis Date     Aortic root dilatation (H)      Aortic stenosis      Benign essential hypertension 1/6/2017     Benign non-nodular prostatic hyperplasia with lower urinary tract symptoms 10/20/2016     CAD (coronary artery disease)     MI 1970     Cardiomyopathy (H)      Carotid artery stenosis 10/20/2016    chronically occluded left internal carotid artery     Carotid occlusion, left      Coronary artery disease involving native coronary artery of native heart without angina pectoris 10/20/2016     Diabetes mellitus (H)      DJD (degenerative joint disease)      Gastro-oesophageal reflux disease      Gastroesophageal reflux disease without esophagitis 10/20/2016     Heart attack      Hyperlipidemia      Hypertension      Mild cognitive impairment 10/20/2016     Nephrolithiasis     RECURRENT     Osteopenia      PAD (peripheral artery disease) (H)     peripheral angiogram 5/2017: The common iliac artery stenoses were stented and the superficial femoral artery stenoses were angioplastied     Paroxysmal atrial fibrillation (H)      PONV (postoperative nausea and vomiting)      RBBB with left anterior fascicular block      Unspecified cerebral artery occlusion with cerebral infarction        DISCHARGE MEDICATIONS:  Current Outpatient Prescriptions   Medication Sig Dispense Refill     MAGNESIUM OXIDE PO Take 400 mg by mouth 2 times daily       ACETAMINOPHEN PO Take 650 mg by mouth as needed for pain Give 1 tablet by  mouth as needed  for pain not to  exceed 3000mg per  day.       polyethylene glycol (MIRALAX/GLYCOLAX) Packet Take 17 g by mouth daily as needed for constipation       exenatide (BYETTA) 5 mcg/0.02mL per dose (60 doses per 1.2 mL prefilled pen) Inject 5 mcg Subcutaneous 2 times daily (before meals) 1 Syringe      cholecalciferol (VITAMIN D3) 77898 UNITS capsule Take 1 capsule (50,000 Units) by mouth once a week  "12 capsule 0     LISINOPRIL PO Take 2.5 mg by mouth daily        metoprolol (TOPROL-XL) 25 MG 24 hr tablet Take 0.5 tablets (12.5 mg) by mouth daily       glipiZIDE (GLUCOTROL) 10 MG tablet Take 1 tablet (10 mg) by mouth 2 times daily (before meals) 180 tablet 3     AMITRIPTYLINE HCL PO Take 25 mg by mouth nightly as needed for sleep       ipratropium (ATROVENT) 0.03 % spray Spray 2 sprays into both nostrils every 12 hours       omeprazole (PRILOSEC) 40 MG capsule Take 1 capsule (40 mg) by mouth daily Take 30-60 minutes before a meal. 90 capsule 3     tamsulosin (FLOMAX) 0.4 MG capsule Take 1 capsule (0.4 mg) by mouth daily 90 capsule 3     aspirin EC 81 MG EC tablet Take 1 tablet (81 mg) by mouth daily 30 tablet 0     METFORMIN HCL PO Take 1,000 mg by mouth 2 times daily (with meals)       ATORVASTATIN CALCIUM PO Take 80 mg by mouth daily       blood glucose monitoring (NO BRAND SPECIFIED) test strip Use to test blood sugar three times daily or as directed. 300 each 3     order for DME Equipment being ordered: True Matrix Blood Glucose meter. 1 Device 0     Clopidogrel Bisulfate, [id number], (PLAVIX PO) Take 75 mg by mouth daily        DULoxetine (CYMBALTA) 30 MG EC capsule Take 1 capsule (30 mg) by mouth daily 90 capsule 3       MEDICATION CHANGES/RATIONALE:   See dc orders. Stopping metamucil and [place] today.    TCU/SNF COURSE: pt has progressed well with PT and OT.  Mg level a little low so replacing it. Minimal pain-- and Tylenol effective. constipation off and on. SBP running mostly 100-120's.    ROS: see under HPI above.    /63  Pulse 75  Temp 97.7  F (36.5  C)  Resp 16  Ht 5' 6\" (1.676 m)  Wt 168 lb 9.6 oz (76.5 kg)  SpO2 96%  BMI 27.21 kg/m2    PHYSICAL EXAM Today:  A & O x 3, NAD. Lungs CTA, non labored. RRR, S1/S2 w/o murmur,gallop or rub.   No edema.  Abdomen soft, nontender, +BT'S. No focal neurological deficits. FERNANDEZ.   Incision is right neck is healing, bruising present.  Also hematoma and " bruising left groin but no sign infection or other issues..       SNF LABS:  CBC RESULTS:   Recent Labs   Lab Test 01/08/18 01/04/18   0630   WBC  8.4  6.9   RBC  3.79*  3.31*   HGB  11.6*  10.1*   HCT  36.3*  31.3*   MCV  96  95   MCH  30.6  30.5   MCHC  32.0  32.3   RDW  13.0  13.1   PLT  199  114*       Last Basic Metabolic Panel:  Recent Labs   Lab Test 01/08/18 01/04/18   0950  01/01/18   0815   NA  140   --   137   POTASSIUM  3.4*  4.0  4.3   CHLORIDE  103   --   105   ALLISON  9.3   --   8.2*   CO2  26   --   26   BUN  9   --   13   CR  0.72  0.70  0.66   GLC  179*   --   156*       Liver Function Studies -   Recent Labs   Lab Test  12/27/16   0650  12/25/16   0002   PROTTOTAL  6.5*  7.1   ALBUMIN  2.8*  3.7   BILITOTAL  0.7  0.6   ALKPHOS  90  58   AST  43  19   ALT  50  21       TSH   Date Value Ref Range Status   12/31/2017 2.16 0.40 - 4.00 mU/L Final   01/20/2017 3.12 0.40 - 4.00 mU/L Final     Lab Results   Component Value Date    A1C 8.6 12/31/2017    A1C 8.9 12/14/2017           DISCHARGE PLAN:  Physical Therapy Follow-up with PCP in 7 days: 7 days.    Current [place] or other scheduled appointments:Future Appointments  Date Time Provider Department Center   1/18/2018 1:30 PM Roland Rodriguez MD [facility]   2/1/2018 2:45 PM [facility]   2/1/2018 3:30 PM Roland Rodriguez MD [facility]       MTM referral needed and placed by this provider:  none    Pending labs: Mg+ today     Discharge Treatments:none        TOTAL DISCHARGE TIME:   Greater than 30 minutes    ASPEN Montes CNP

## 2018-01-11 NOTE — PROGRESS NOTES
PRIMARY CARE PROVIDER AND CLINIC RESPONSIBLE:  Matt Morrissey, [facility] [address] / NABEEL M*        ADMISSION HISTORY AND PHYSICAL EXAMINATION     Chief Complaint   Patient presents with     Fatigue         HISTORY OF PRESENT ILLNESS:  89 year old male, (1/31/1928), admitted to the [facility] TCU for continuation of medical care and rehab.  HPI information obtained from: facility chart records, facility staff, patient report and [facility] chart review.    Dustin Pearce is a 89 year old male with history of CVA/TIA, hypertension, DM type II, atrial fibrillation, cognitive issues who was admitted at [facility] from 12/31/17 to 1/4/18 due to with slurred speech and confusion. ED workup showed CT head and MRI brain showed a old large chronic infarct anterolateral aspect of left frontal lobe and no evidence of acute intracranial pathology. Patient was diagnosed TIA and was beyond tPA window. He was seen by neurology and vascular team. He has right carotid artery disease and chronically occluded left carotid artery. Patient underwent Rt carotid enarterectomy, as the right carotid artery stenosis on 1/3/18. He was treated ASA, Plavix, statins. Patient's lisinopril was held due to hyperkalemia. Patient had urinary retention, treated with Flomax and PVR were checked. He feels tired and weak, but able to walk. He has hearing impairment. Slept fair, appetite good and had BM. Patient denies chest pain, dyspnea, abdominal pain, n&v, fever, chills, dysuria, leg pain or swelling. The rest of ROS is unremarkable. Patient is seen and examined by me with MARK Petty NP. Please see MARK Petty's admit noted dated 1/5/18 for details of admission, past medical history, family history, allergies, medication list, social history and other details pertinent with this admission. Hospital admission and dc summary reviewed.    Past Medical History:   Diagnosis Date     Aortic root  dilatation (H)      Aortic stenosis      Benign essential hypertension 1/6/2017     Benign non-nodular prostatic hyperplasia with lower urinary tract symptoms 10/20/2016     CAD (coronary artery disease)     MI 1970     Cardiomyopathy (H)      Carotid artery stenosis 10/20/2016    chronically occluded left internal carotid artery     Carotid occlusion, left      Coronary artery disease involving native coronary artery of native heart without angina pectoris 10/20/2016     Diabetes mellitus (H)      DJD (degenerative joint disease)      Gastro-oesophageal reflux disease      Gastroesophageal reflux disease without esophagitis 10/20/2016     Heart attack      Hyperlipidemia      Hypertension      Mild cognitive impairment 10/20/2016     Nephrolithiasis     RECURRENT     Osteopenia      PAD (peripheral artery disease) (H)     peripheral angiogram 5/2017: The common iliac artery stenoses were stented and the superficial femoral artery stenoses were angioplastied     Paroxysmal atrial fibrillation (H)      PONV (postoperative nausea and vomiting)      RBBB with left anterior fascicular block      Unspecified cerebral artery occlusion with cerebral infarction        Past Surgical History:   Procedure Laterality Date     AMPUTATE FOOT Left 6/4/2017    Procedure: AMPUTATE FOOT;  Sun Add#3: partial left foot amputation - Sagital Saw - Mini C-Arm;  Surgeon: Moe Kirk DPM;  Location:  OR     CHOLECYSTECTOMY       ENDARTERECTOMY CAROTID Right 1/3/2018    Procedure: ENDARTERECTOMY CAROTID;  RIGHT CAROTID ENDARTERECTOMY WITH EEG;  Surgeon: Roland Rodriguez MD;  Location:  OR     ESOPHAGOSCOPY, GASTROSCOPY, DUODENOSCOPY (EGD), COMBINED N/A 12/26/2016    Procedure: COMBINED ESOPHAGOSCOPY, GASTROSCOPY, DUODENOSCOPY (EGD);  Surgeon: Nasim Louis MD;  Location:  GI     GENITOURINARY SURGERY      KIDNEY STONE REMOVAL X 4     HC UGI ENDOSCOPY W EUS N/A 12/29/2016    Procedure: COMBINED ENDOSCOPIC  ULTRASOUND, ESOPHAGOSCOPY, GASTROSCOPY, DUODENOSCOPY (EGD);  Surgeon: Nasim Louis MD;  Location:  GI     HERNIA REPAIR       INCISION AND DRAINAGE FOOT, COMBINED Left 6/6/2017    Procedure: COMBINED INCISION AND DRAINAGE FOOT;  REVISIONAL LEFT FOOT INCISION AND DRAINAGE WITH POSSIBLE FLAP CLOSURE , ANTIBIOTIC SOLUTION ;  Surgeon: Nabil Ann DPM;  Location:  OR     LASER HOLMIUM LITHOTRIPSY URETER(S), INSERT STENT, COMBINED  3/2/2012    Procedure:COMBINED CYSTOSCOPY, URETEROSCOPY, LASER HOLMIUM LITHOTRIPSY URETER(S), INSERT STENT; CYSTOSCOPY, RIGHT RETROGRADES, RIGHT URETEROSCOPY, HOLMIUM LASER, RIGHT STENT PLACEMENT; Surgeon:ANJELICA LEÓN; Location: OR     ROTATOR CUFF REPAIR RT/LT         Current Outpatient Prescriptions   Medication Sig     MAGNESIUM OXIDE PO Take 400 mg by mouth 2 times daily     ACETAMINOPHEN PO Take 650 mg by mouth as needed for pain Give 1 tablet by  mouth as needed  for pain not to  exceed 3000mg per  day.     HYDROcodone-acetaminophen (NORCO) 5-325 MG per tablet Take 1 tablet by mouth every 6 hours as needed for moderate to severe pain Give 1 tablet by mouth every 6 hours as needed for moderate to severe  pain of the neck     exenatide (BYETTA) 5 mcg/0.02mL per dose (60 doses per 1.2 mL prefilled pen) Inject 5 mcg Subcutaneous 2 times daily (before meals)     cholecalciferol (VITAMIN D3) 71328 UNITS capsule Take 1 capsule (50,000 Units) by mouth once a week     LISINOPRIL PO Take 2.5 mg by mouth daily      metoprolol (TOPROL-XL) 25 MG 24 hr tablet Take 0.5 tablets (12.5 mg) by mouth daily     glipiZIDE (GLUCOTROL) 10 MG tablet Take 1 tablet (10 mg) by mouth 2 times daily (before meals)     AMITRIPTYLINE HCL PO Take 25 mg by mouth nightly as needed for sleep     ipratropium (ATROVENT) 0.03 % spray Spray 2 sprays into both nostrils every 12 hours     DULoxetine (CYMBALTA) 30 MG EC capsule Take 1 capsule (30 mg) by mouth daily     omeprazole (PRILOSEC) 40 MG  "capsule Take 1 capsule (40 mg) by mouth daily Take 30-60 minutes before a meal.     tamsulosin (FLOMAX) 0.4 MG capsule Take 1 capsule (0.4 mg) by mouth daily     aspirin EC 81 MG EC tablet Take 1 tablet (81 mg) by mouth daily     METFORMIN HCL PO Take 1,000 mg by mouth 2 times daily (with meals)     ATORVASTATIN CALCIUM PO Take 80 mg by mouth daily     blood glucose monitoring (NO BRAND SPECIFIED) test strip Use to test blood sugar three times daily or as directed.     order for DME Equipment being ordered: True Matrix Blood Glucose meter.     Psyllium (METAMUCIL PO) Take 1 packet by mouth daily as needed      Clopidogrel Bisulfate, [id number], (PLAVIX PO) Take 75 mg by mouth daily      No current facility-administered medications for this visit.        Allergies   Allergen Reactions     Oxycodone Other (See Comments)     \"TERRIBLE SWEATING\"     Sulfa Drugs      Tetracycline        Social History     Social History     Marital status:      Spouse name: N/A     Number of children: N/A     Years of education: N/A     Occupational History     Not on file.     Social History Main Topics     Smoking status: Former Smoker     Packs/day: 1.00     Years: 30.00     Types: Cigarettes     Quit date: 1/1/1999     Smokeless tobacco: Never Used     Alcohol use 4.2 oz/week     7 Standard drinks or equivalent per week      Comment: 1 drink per week     Drug use: No     Sexual activity: Not Currently     Partners: Female     Other Topics Concern     Not on file     Social History Narrative          Information reviewed:  Medications, vital signs, orders, nursing notes, problem list, hospital information.     ROS: All 10 point review of system completed, those pertinent positive, please see H&P, the remaining ROS is negative.    /63  Pulse 75  Temp 97.7  F (36.5  C)  Resp 16  Ht 5' 6\" (1.676 m)  Wt 168 lb 9.6 oz (76.5 kg)  SpO2 96%  BMI 27.21 kg/m2    PHYSICAL EXAMINATION:   GENERAL:  No acute distress.  SKIN:  " Dry and warm.  See below.  HEENT:  Head without trauma.  Pupils round, reactive. Exam of conjunctiva and lids are normal. Sclera without icterus. There is no oral thrush. Hearing aid in place  NECK:  Supple. No jugular venous distension. Right neck surgical site with steri-strips, dried blood, healing.  CHEST: No reproducible chest tenderness. Right upper chest pressure dressing removed, small wound with dried blood.  LUNGS:  Normal respiratory effort. Lungs reveal decreased breath sounds at bases. No rales or wheezes.  HEART:  Regular rate and rhythm.  No murmur, gallops or rubs auscultated.  ABDOMEN:  Soft, bowel sounds positive.  There is no tenderness or guarding. Left groin compression dressing removed, there are extensive ecchymosis, no open wounds.  EXTREMITIES: No edema.   NEUROLOGIC:  Alert and oriented x3.  Moving all ext. Gait not tested.  PSYCH: Recent and remote memory is mild impairment. Mood and affect are normal.    Lab/Diagnostic data:  Reviewed    CBC Lab Results (Last 5 results in 365 days)       WBC RBC Hgb Hct MCV MCH MCHC RDW Plt Neut # Lymph # Eos # Baso # Immat. Gran #   01/08/18 0000 8.4 3.79 11.6 36.3 96 30.6 32.0 13.0 199 -- -- -- -- --   01/04/18 0630 6.9 3.31 10.1 31.3 95 30.5 32.3 13.1 114 -- -- -- -- --   01/01/18 0815 5.3 3.93 12.1 36.5 93 30.8 33.2 12.7 126 -- -- -- -- --   12/31/17 1130 9.6 4.43 13.7 41.8 94 30.9 32.8 13.0 160 6.1 2.4 0.1 0.0 0.0   09/28/17 1649 -- -- 12.7 -- -- -- -- -- -- -- -- -- -- --   CMP Labs (Last 5 results in 365 days)       Na+ K+ Cl CO2 ANION GAP Glc BUN Creat GFR GFR-Black Calcium Mg ALT AST A1C TSH LDL HIV   01/08/18 0000 140 3.4 103 26 11 179 9 0.72 >60 >60 9.3 -- -- -- -- -- -- --   01/04/18 1631 -- -- -- -- -- 185 -- -- -- -- -- -- -- -- -- -- -- --   01/04/18 1215 -- -- -- -- -- 220 -- -- -- -- -- -- -- -- -- -- -- --   01/04/18 0950 -- 4.0 -- -- -- -- -- -- -- -- -- -- -- -- -- -- -- --   01/04/18 0950 -- -- -- -- --                  Results for  orders placed or performed during the hospital encounter of 12/31/17   CTA Angiogram Head Neck    Narrative    CT ANGIOGRAM OF THE HEAD AND NECK WITHOUT AND WITH CONTRAST   12/31/2017 11:52 AM     COMPARISON: None.    HISTORY: Code Stroke. Left-sided weakness.    TECHNIQUE:  Precontrast localizing scans were followed by CT  angiography with an injection of 70 mL Isovue-370 nonionic intravenous  contrast material with scans through the head and neck.  Images were  transferred to a separate 3-D workstation where multiplanar  reformations and 3-D images were created.  Estimates of carotid  stenoses are made relative to the distal internal carotid artery  diameters except as noted.      FINDINGS:   Neck CTA: The common carotid arteries bilaterally remain patent  without stenosis. Chronic occlusion of the left internal carotid  artery beginning at the origin again noted. Atherosclerotic plaque  resulting in less than 50% luminal diameter stenosis of the origin of  the right internal carotid artery again noted. The cervical internal  carotid artery is tortuous but is otherwise patent with no additional  areas of narrowing. There is mild atherosclerotic narrowing at the  origin of the left vertebral artery. The right vertebral artery is  patent without stenosis.    Head CTA: There is calcified atherosclerotic plaque of the  intracranial distal internal carotid artery on the right that does not  result in stenosis. The left internal carotid artery is occluded.  There is moderate atherosclerotic narrowing at the distal P2 segment  of the left posterior cerebral artery that is unchanged. The basilar  artery is patent without stenosis. The bilateral anterior cerebral,  bilateral middle cerebral and right posterior cerebral arteries are  patent without stenosis. The anterior communicating and bilateral  posterior communicating arteries remain patent.      Impression    IMPRESSION:  1. Chronic occlusion of the left internal  carotid artery from the  origin to the terminus again noted without change.  2. Moderate atherosclerotic narrowing of the distal P2 segment of the  left posterior cerebral artery again noted without change.  3. Atherosclerotic plaque at the origin of the right internal carotid  artery resulting in less than 50% luminal diameter stenosis again  noted.  4. No evidence for intracranial cerebral artery occlusion to explain  the patient's recent symptoms.  5. Overall, no change from the comparison study.      Radiation dose for this scan was reduced using automated exposure  control, adjustment of the mA and/or kV according to patient size, or  iterative reconstruction technique.    LISANDRO MAGALLON MD   CT Head w Contrast    Narrative    CT BRAIN PERFUSION 12/31/2017 11:56 AM    COMPARISON: None.    HISTORY:  Code Stroke; left-sided weakness.    TECHNIQUE: Time sequential axial CT images of the head were acquired  during the administration of intravenous contrast (50 mL Isovue-370).  CTA images of the [place] as well as color perfusion maps of  the brain were created from this time sequential axial source data.      Impression    IMPRESSION: There is a large chronic infarct at the anterolateral  aspect of the left frontal lobe. There are no other perfusion defects.    Radiation dose for this scan was reduced using automated exposure  control, adjustment of the mA and/or kV according to patient size, or  iterative reconstruction technique.    LISANDRO MAGALLON MD   CT Head w/o Contrast    Narrative    CT OF THE HEAD WITHOUT CONTRAST 12/31/2017 11:39 AM     COMPARISON: Brain MR 1/20/2017.    HISTORY: Code Stroke; left-sided weakness.    TECHNIQUE: 5 mm thick axial CT images of the head were acquired  without IV contrast material.    FINDINGS: A moderate-sized chronic left frontal infarct is again noted  without change. There is moderate diffuse cerebral volume loss. There  are subtle patchy areas of decreased density  in the cerebral white  matter bilaterally that are consistent with sequela of chronic small  vessel ischemic disease.    The ventricles and basal cisterns are within normal limits in  configuration given the degree of cerebral volume loss.  There is no  midline shift. There are no extra-axial fluid collections.     No intracranial hemorrhage, mass or recent infarct.    The visualized paranasal sinuses are well-aerated. There is no  mastoiditis. There are no fractures of the visualized bones.       Impression    IMPRESSION: Moderate-sized chronic left frontal infarct again noted  without change. Diffuse cerebral volume loss and cerebral white matter  changes consistent with chronic small vessel ischemic disease. No  evidence for acute intracranial pathology.       Radiation dose for this scan was reduced using automated exposure  control, adjustment of the mA and/or kV according to patient size, or  iterative reconstruction technique.    LISANDRO MAGALLON MD   MR Brain w/o & w Contrast    Narrative    MRI OF THE BRAIN WITHOUT AND WITH CONTRAST January 1, 2018 6:13 AM     HISTORY: New onset confusion this morning, also slurred speech,  history of stroke.     TECHNIQUE: Axial diffusion-weighted with ADC map, axial T2-weighted  with fat saturation, axial T1-weighted, axial turboFLAIR and coronal  T1-weighted images of the brain were acquired without intravenous  contrast. Following intravenous administration of gadolinium (7 mL  Gadavist), axial T1-weighted images of the brain were acquired.     COMPARISON: Head CT 12/31/2017.    FINDINGS: There is moderate diffuse cerebral volume loss. There are  patchy periventricular areas of abnormal T2 signal hyperintensity in  the cerebral white matter bilaterally that are consistent with sequela  of chronic small vessel ischemic disease. A moderate sized chronic  left frontal infarct is again noted without change.    The ventricles and basal cisterns are within normal limits  in  configuration given the degree of cerebral volume loss. There is no  midline shift. There are no extra-axial fluid collections. There is no  evidence for stroke or acute intracranial hemorrhage. There is no  abnormal contrast enhancement in the brain or its coverings.     There is no sinusitis or mastoiditis.      Impression    IMPRESSION: Diffuse cerebral volume loss and cerebral white matter  changes consistent with chronic small vessel ischemic disease.  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 No evidence for acute intracranial pathology. No change from  the comparison study.    LISANDRO MAGALLON MD   US Carotid Right    Narrative    RIGHT CAROTID ULTRASOUND   1/2/2018 8:04 AM     HISTORY:  ? Worsening right ICA stenosis;     COMPARISON: None.    RIGHT CAROTID FINDINGS:  Shadowing plaque in the common carotid,  carotid bulb and internal carotid artery  Right ICA PSV:  228  cm/sec.  Right ICA EDV:  51 cm/sec.  Right ICA/CCA PSV Ratio:  3.0    These indicate  50 - 69%  diameter stenosis of the right ICA.    Right Vertebral: Antegrade flow.   Right ECA: Antegrade flow.      Causes of Decreased Accuracy:   None.       Impression    IMPRESSION:    50-69% diameter stenosis of the right ICA relative to the distal ICA  diameter.    BONNIE FARRAR DO         ASSESSMENT / PLAN:     Physical deconditioning  Plan: PT/OT with increase independence, self-care, strength and endurance and other cares that help return to home/facility of origin, fall precautions. Care conference with patient and family for the progress of rehab and disposition issues will be discussed as planned. Rehab evaluation and other evaluations including CPT are at rehab logs, to be reviewed separately.  Fall risk assessment as well as cognitive evaluation will be formed during rehab stay if indicated.    Transient cerebral ischemia, unspecified type, History of CVA (cerebrovascular accident) and Stenosis of right  carotid artery S/P carotid endarterectomy  Plan: Continue ASA and Plavix. Follow up vascular surgery as scheduled.    Paroxysmal atrial fibrillation (H)  Plan: Continue metoprolol and ASA    Essential hypertension  Plan: Continue metoprolol and Lisinopril.    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  Lab Results   Component Value Date    A1C 8.6 12/31/2017    A1C 8.9 12/14/2017    A1C 8.7 12/01/2017    A1C 6.7 07/24/2017    A1C 8.3 04/21/2017     Other problems with same care. Primary care doctor and other specialists to address those chronic problems in next clinic appointment to be scheduled upon discharge from the TCU.      Total time spent with patient visit was 35 min including patient visit, review of past records, patients counseling and coordinating care.        Jose Guadalupe Rodriguez MD

## 2018-01-15 ENCOUNTER — TELEPHONE (OUTPATIENT)
Dept: FAMILY MEDICINE | Facility: CLINIC | Age: 83
End: 2018-01-15

## 2018-01-15 ENCOUNTER — CARE COORDINATION (OUTPATIENT)
Dept: CARE COORDINATION | Facility: CLINIC | Age: 83
End: 2018-01-15

## 2018-01-15 NOTE — TELEPHONE ENCOUNTER
Chief Complaint: Stenosis Of Right Carotid Artery,1/14/18,ED/IP 1/2  [phone] (home) none (work)

## 2018-01-15 NOTE — TELEPHONE ENCOUNTER
Clinic Care Coordination Contact  Care Team Conversations    Please see care coordination encounter for follow up.   FERNANDO ChavezN, RN, PHN  Clinic Care Coordinator  [facility]  [phone]

## 2018-01-15 NOTE — TELEPHONE ENCOUNTER
Dustin does not need carotid u/s that was scheduled fro 02/01/18 per Dr. Rodriguez.  Pt will need u/s in 3 months.  Therefore, we coordinated the AAA ultrasound that Dr. Morrissey requested with Dustin's post op scheduled for 01/18/18.  Sister is aware. Lakshmi Gerard,

## 2018-01-15 NOTE — PROGRESS NOTES
Clinic Care Coordination Contact  OUTREACH    Referral Information:  Referral Source: CTS  Reason for Contact: TCU discharge follow up.  Care Conference: No     Universal Utilization:   ED Visits in last year: 1  Hospital visits in last year: 3  Last PCP appointment: 12/01/18  Missed Appointments: 2  Concerns: No  Multiple Providers or Specialists: Yes  [facility] 12/03-01/04/17  [facility] until 01/14/18    Clinical Concerns:  Current Medical Concerns:   TIA s/p right carotid enarterectomy. No current concerns. Monitoring BP bid. Observing for orthostatic hypotension.   Current Behavioral Concerns: Patient has cognitive decline    Education Provided to patient: what s/s to watch for.   Clinical Pathway Name: None  Clinical Pathway: None    Medication Management:  Elevated BG while inpatient.    PTA metformin and glipizide,  Byetta was added.     Functional Status:  Mobility Status: Independent w/Device  Equipment Currently Used at Home: cane, straight, walker, rolling  Transportation: Provided by friends.           Psychosocial:  Current living arrangement:: I live in a private home  Financial/Insurance: Medicare/BCBS       Resources and Interventions:  Current Resources:  (n/a);  (NA)  PAS Number:  (n/a)  Senior Linkage Line Referral Placed:  (n/a)  Advanced Care Plans/Directives on file:: No  Referrals Placed: Senior Linkage Line     Goals:   Goal 1 Statement: I will check my blood pressure 3 times per week  Goal 1 Supportive Steps: I will go to the local pharmacy to check my BP  Goal 1 Progression Percent: 50%  Goal 1 Progression Date: 01/15/18  Goal 2 Statement: I will complete my [place] [employer] Health Care Directive.   Goal 2 Progression Percent: 10%  Goal 2 Progression Date: 01/15/18           Barriers: Cognitive decline   Strengths: support of significant other deana  Patient/Caregiver understanding: Patient to f/u with PCP 02/01/2018  Frequency of Care Coordination:  (Every 2-4 weeks)  Upcoming  appointment: 02/01/18     Plan:   RN CCC to f/u with patient in 2-4 weeks.   Patient to monitor BP bid, and BG tid. Bring results to 02/01/2018 OV with Dr. Morrissey .    Elvira Guerra, FERNANDON, RN, PHN  Clinic Care Coordinator  [facility]  [phone]

## 2018-01-18 ENCOUNTER — OFFICE VISIT (OUTPATIENT)
Dept: OTHER | Facility: CLINIC | Age: 83
End: 2018-01-18
Attending: SURGERY
Payer: MEDICARE

## 2018-01-18 ENCOUNTER — MEDICAL CORRESPONDENCE (OUTPATIENT)
Dept: HEALTH INFORMATION MANAGEMENT | Facility: CLINIC | Age: 83
End: 2018-01-18

## 2018-01-18 ENCOUNTER — HOSPITAL ENCOUNTER (OUTPATIENT)
Dept: ULTRASOUND IMAGING | Facility: CLINIC | Age: 83
Discharge: HOME | End: 2018-01-18
Attending: INTERNAL MEDICINE | Admitting: INTERNAL MEDICINE
Payer: MEDICARE

## 2018-01-18 VITALS — SYSTOLIC BLOOD PRESSURE: 101 MMHG | HEART RATE: 52 BPM | DIASTOLIC BLOOD PRESSURE: 41 MMHG

## 2018-01-18 DIAGNOSIS — I71.40 ABDOMINAL AORTIC ANEURYSM (AAA) WITHOUT RUPTURE (H): Chronic | ICD-10-CM

## 2018-01-18 DIAGNOSIS — I73.9 PAD (PERIPHERAL ARTERY DISEASE) (H): ICD-10-CM

## 2018-01-18 DIAGNOSIS — I71.40 ABDOMINAL AORTIC ANEURYSM (AAA) WITHOUT RUPTURE (H): ICD-10-CM

## 2018-01-18 DIAGNOSIS — Z98.890 S/P CAROTID ENDARTERECTOMY: Primary | ICD-10-CM

## 2018-01-18 PROCEDURE — 99213 OFFICE O/P EST LOW 20 MIN: CPT | Mod: ZP | Performed by: SURGERY

## 2018-01-18 PROCEDURE — G0463 HOSPITAL OUTPT CLINIC VISIT: HCPCS

## 2018-01-18 PROCEDURE — 93978 VASCULAR STUDY: CPT

## 2018-01-18 NOTE — PROGRESS NOTES
[place] [facility]    Dustin Pearce and his wife return to see me in follow-up.  He has a known chronic left ICA occlusion but was found to have several neurological events felt secondary to the moderate ulcerated right carotid stenosis.  He underwent a right CEA where we did find an ulcerated stenosis on 1/3/2018.  He has had no further events.  He is on chronic aspirin and Plavix.  Except for some mild mandibular numbness he is doing very well following the surgery.    He also has a history of PAD.  He had a left toe ulcer.  Angiogram in June 2017 revealed a small AAA.  90% stenosis of the left common iliac artery was angioplastied and stented.  Angioplasty of a 60% distal SFA stenosis also performed.  Anterior tibial is the only patent artery but occluded at the ankle with only small distal branches.  Toe ulcer is healed over with no recurrence following this.      Exam: Alert and appropriate.  Blood pressure 101/41.  Pulse 82.               Healing right carotid incision with no evidence of complications or swelling.               Mild mandibular numbness as expected.  Normal cranial nerve XII                                       Function.               Strong biphasic Doppler to the left distal anterior tibial artery implying patency of iliac and SFA.  No ulcerations.              Duplex reveals a small AAA measuring 3 x 3.3 cm.  Both iliacs are patent including the stent of the left common iliac artery.          Impression: #1  Doing well following right CEA with known left ICA occlusion.  Continue on aspirin and Plavix indefinitely.  Follow-up right carotid duplex in 3 months.                         #2   Small AAA.  Patent left iliac stent by duplex.  Patent SFA indirectly by Doppler to the anterior tibial artery.  This will be reevaluated in 1 year.    Roland Rodriguez MD     Please route or send letter to:  Primary Care Provider (PCP)

## 2018-01-18 NOTE — MR AVS SNAPSHOT
After Visit Summary   1/18/2018    Dustin Pearce    MRN: [id number]           Patient Information     Date Of Birth          1/31/1928        Visit Information        Provider Department      1/18/2018 11:45 AM Roland Rodriguez MD [facility]      Today's Diagnoses     S/P carotid endarterectomy    -  1    Abdominal aortic aneurysm (AAA) without rupture (H)        PAD (peripheral artery disease) (H)           Follow-ups after your visit        Follow-up notes from your care team     Return in about 3 months (around 4/18/2018).      Your next 10 appointments already scheduled     Feb 01, 2018  6:30 PM CST   Office Visit with Matt Morrissey MD   [facility] ([facility]    [address] [address] [phone]           Bring a current list of meds and any records pertaining to this visit. For Physicals, please bring immunization records and any forms needing to be filled out. Please arrive 10 minutes early to complete paperwork.              Future tests that were ordered for you today     Open Future Orders        Priority Expected Expires Ordered    US Aorta/Ivc/Iliac Duplex Complete Routine  1/6/2019 1/6/2018            Who to contact     If you have questions or need follow up information about today's clinic visit or your schedule please contact [facility] directly at [phone].  Normal or non-critical lab and imaging results will be communicated to you by MyChart, letter or phone within 4 business days after the clinic has received the results. If you do not hear from us within 7 days, please contact the clinic through MyChart or phone. If you have a critical or abnormal lab result, we will notify you by phone as soon as possible.  Submit refill requests through [employer] or call your pharmacy and they will forward the refill request to us. Please allow 3  "business days for your refill to be completed.          Additional Information About Your Visit        MyChart Information     [employer] lets you send messages to your doctor, view your test results, renew your prescriptions, schedule appointments and more. To sign up, go to [email or web address] . Click on \"Log in\" on the left side of the screen, which will take you to the Welcome page. Then click on \"Sign up Now\" on the right side of the page.     You will be asked to enter the access code listed below, as well as some personal information. Please follow the directions to create your username and password.     Your access code is: [email or web address]  Expires: 2018 11:54 AM     Your access code will  in 90 days. If you need help or a new code, please call your [facility] clinic or [phone].        Care EveryWhere ID     This is your Care EveryWhere ID. This could be used by other organizations to access your [facility] medical records  [id number]        Your Vitals Were     Pulse                   52            Blood Pressure from Last 3 Encounters:   18 101/41   18 123/63   18 123/63    Weight from Last 3 Encounters:   18 168 lb 9.6 oz (76.5 kg)   18 168 lb 9.6 oz (76.5 kg)   18 164 lb (74.4 kg)              Today, you had the following     No orders found for display       Primary Care Provider Office Phone # Fax #    Matt Morrissey [phone]       [facility] [address] [facility] [address]  [facility] [address]        Equal Access to Services     LAURENCE JACOBSON AH: Hadii kinza Brooks, wakanada luulysses, qaybta ingridalmaurice liu. So [facility] [phone].    ATENCIÓN: Si habla español, tiene a garvey disposición servicios gratuitos de asistencia lingüística. Jose Luis al [phone].    We comply with applicable federal civil rights laws and [place] laws. We do not discriminate on the basis of race, color, " national origin, age, disability, sex, sexual orientation, or gender identity.            Thank you!     Thank you for choosing [facility]  for your care. Our goal is always to provide you with excellent care. Hearing back from our patients is one way we can continue to improve our services. Please take a few minutes to complete the written survey that you may receive in the mail after your visit with us. Thank you!             Your Updated Medication List - Protect others around you: Learn how to safely use, store and throw away your medicines at www.disposemymeds.org.          This list is accurate as of: 1/18/18 12:42 PM.  Always use your most recent med list.                   Brand Name Dispense Instructions for use Diagnosis    ACETAMINOPHEN PO      Take 650 mg by mouth as needed for pain Give 1 tablet by mouth as needed for pain not to exceed 3000mg per day.        AMITRIPTYLINE HCL PO      Take 25 mg by mouth nightly as needed for sleep        aspirin 81 MG EC tablet     30 tablet    Take 1 tablet (81 mg) by mouth daily    Transient cerebral ischemia, unspecified type       ATORVASTATIN CALCIUM PO      Take 80 mg by mouth daily        blood glucose monitoring test strip    no brand specified    300 each    Use to test blood sugar three times daily or as directed.    Hypoglycemia       cholecalciferol 01148 UNITS capsule    VITAMIN D3    12 capsule    Take 1 capsule (50,000 Units) by mouth once a week    Vitamin D deficiency       DULoxetine 30 MG EC capsule    CYMBALTA    90 capsule    Take 1 capsule (30 mg) by mouth daily    Polyneuropathy associated with underlying disease (H)       exenatide 5 MCG/0.02ML Sopn injection    BYETTA    1 Syringe    Inject 5 mcg Subcutaneous 2 times daily (before meals)    Type 2 diabetes mellitus with other specified complication, without long-term current use of insulin (H)       glipiZIDE 10 MG tablet    GLUCOTROL    180 tablet    Take 1 tablet (10  mg) by mouth 2 times daily (before meals)    Type 2 diabetes mellitus with diabetic peripheral angiopathy without gangrene, without long-term current use of insulin (H)       insulin pen needle 32G X 4 MM    BD JOE U/F    360 each    Use 4  times daily or as directed.    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)       ipratropium 0.03 % spray    ATROVENT     Spray 2 sprays into both nostrils every 12 hours        LISINOPRIL PO      Take 2.5 mg by mouth daily        MAGNESIUM OXIDE PO      Take 400 mg by mouth 2 times daily        METFORMIN HCL PO      Take 1,000 mg by mouth 2 times daily (with meals)        metoprolol succinate 25 MG 24 hr tablet    TOPROL-XL     Take 0.5 tablets (12.5 mg) by mouth daily    Cardiomyopathy, unspecified type (H)       omeprazole 40 MG capsule    priLOSEC    90 capsule    Take 1 capsule (40 mg) by mouth daily Take 30-60 minutes before a meal.    Other acute gastritis without hemorrhage       order for DME     1 Device    Equipment being ordered: True Matrix Blood Glucose meter.    Type 2 diabetes mellitus without complication (H)       PLAVIX PO      Take 75 mg by mouth daily    Cough       polyethylene glycol Packet    MIRALAX/GLYCOLAX     Take 17 g by mouth daily as needed for constipation        tamsulosin 0.4 MG capsule    FLOMAX    90 capsule    Take 1 capsule (0.4 mg) by mouth daily    Benign non-nodular prostatic hyperplasia with lower urinary tract symptoms

## 2018-01-18 NOTE — LETTER
[place] [id number] [facility]  [address]  [place]  [address]  Tel: [phone]    January 22, 2018    Dustin Pearce  [address] [address]        Dear Mr. Pearce,    The following letter pertains to your most recent diagnostic tests:    Good news! Your abdominal aortic aneurysm has not changed much since 12/2016.  We should recheck in one year.      Sincerely,    Matt Morrissey MD/[facility]          Enclosure: Lab Results  Results for orders placed or performed during the hospital encounter of 01/18/18   US Aorta/Ivc/Iliac Duplex Complete    Narrative    US AORTA/IVC/ILIAC DUPLEX COMPLETE  1/18/2018 11:29 AM    HISTORY: ; Abdominal aortic aneurysm (AAA) without rupture (H)    COMPARISON: None.    FINDINGS: There is aneurysmal dilatation of the infrarenal abdominal  aorta. Mild calcification and atherosclerotic changes are noted  throughout the abdominal aorta and iliac arteries. The abdominal  aortic measurements are as follows:    Proximal (suprarenal) aorta: 2.0 cm AP x 2.1 cm transverse.  Mid (infrarenal) aorta:  3.0. cm AP x 3.3 cm transverse.  Distal aorta: 2.1 cm AP x 2.2 cm transverse.  Right common iliac artery: 1.6 cm.  Left common iliac artery: 1.4 cm.  Right external iliac artery 0.8 cm  Left external iliac artery 0.9 cm       Impression    IMPRESSION: Aneurysmal dilatation of the infrarenal mid abdominal  aorta measuring 3.0 x 3.3 cm.      BONNIE FARRAR DO

## 2018-01-18 NOTE — NURSING NOTE
"Chief Complaint   Patient presents with     RECHECK     1st PO; RIGHT CAROTID ENDARTERECTOMY *jmw *pt is having a AAA u/s at 11:15 that was requested by Dr. Morrissey       Initial /41 (BP Location: Left arm, Patient Position: Chair, Cuff Size: Adult Regular)  Pulse 52 Estimated body mass index is 27.21 kg/(m^2) as calculated from the following:    Height as of 1/11/18: 5' 6\" (1.676 m).    Weight as of 1/11/18: 168 lb 9.6 oz (76.5 kg).  Medication Reconciliation: complete     Deidra Maria MA   "

## 2018-01-18 NOTE — LETTER
[facility] [place] [place] [address]  [place] [address]  Phone: [phone]  Fax: [phone]    2018    Re: Dustin Pearce, : 1928    Dustin Pearce and his wife return to see me in follow-up.  He has a known chronic left ICA occlusion but was found to have several neurological events felt secondary to the moderate ulcerated right carotid stenosis.  He underwent a right CEA where we did find an ulcerated stenosis on 1/3/2018.  He has had no further events.  He is on chronic aspirin and Plavix. Except for some mild mandibular numbness he is doing very well following the surgery.     He also has a history of PAD.  He had a left toe ulcer.  Angiogram in 2017 revealed a small AAA.  90% stenosis of the left common iliac artery was angioplastied and stented. Angioplasty of a 60% distal SFA stenosis also performed.  Anterior tibial is the only patent artery but occluded at the ankle with only small distal branches.  Toe ulcer is healed over with no recurrence following this.       Exam: Alert and appropriate.  Blood pressure 101/41.  Pulse 82.               Healing right carotid incision with no evidence of complications or swelling.               Mild mandibular numbness as expected.  Normal cranial nerve XII                                       Function.               Strong biphasic Doppler to the left distal anterior tibial artery implying patency of iliac and SFA.  No ulcerations.     Duplex reveals a small AAA measuring 3 x 3.3 cm.  Both iliacs are patent including the stent of the left common iliac artery.     Impression: #1  Doing well following right CEA with known left ICA occlusion.  Continue on aspirin and Plavix indefinitely.  Follow-up right carotid duplex in 3 months.                          #2   Small AAA.  Patent left iliac stent by duplex.  Patent SFA indirectly by Doppler to the anterior tibial artery.  This will be reevaluated in 1  year.     Roland Rodriguez MD

## 2018-01-19 DIAGNOSIS — I65.21 CAROTID STENOSIS, RIGHT: Primary | ICD-10-CM

## 2018-01-25 ENCOUNTER — CARE COORDINATION (OUTPATIENT)
Dept: CARE COORDINATION | Facility: CLINIC | Age: 83
End: 2018-01-25

## 2018-01-25 NOTE — PROGRESS NOTES
Clinic Care Coordination Contact  Care Team Conversations    Patient's significant other, Halina (C2C on file), phoned regarding new prescription for Byetta 5mcg bid.   Byetta was prescribed while at [facility] (1/04/18).    BG has been stable at 130-150s since d/c.    There appears to be enough medication to last until next OV.  Writer advised to continue with d/c medication and discuss ongoing medication management at f/u with PCP on 02/01/18    Patient noted to have BP of 101/41 on 01/18/18. After the appointment he was walking down the streeter and began to drag his feet and then fell forward slowly. Halina was able to stop the fall until help came. Patient quickly recovered and went home, no further episodes noted since then.   Advised to check BP in AM and again several minutes after standing. Bring results to next OV.     Plan:   Check orthostatic BP.   Continue to monitor BG.  Continue Byetta, review medication changes at next OV.   Call if new/worsening symptoms, syncope.   RN CCC to f/u in 4 weeks.     FERNANDO ChavezN, RN, PHN  Clinic Care Coordinator  [facility]  [phone]

## 2018-02-02 ENCOUNTER — TRANSFERRED RECORDS (OUTPATIENT)
Dept: HEALTH INFORMATION MANAGEMENT | Facility: CLINIC | Age: 83
End: 2018-02-02

## 2018-02-07 ENCOUNTER — OFFICE VISIT (OUTPATIENT)
Dept: URGENT CARE | Facility: URGENT CARE | Age: 83
End: 2018-02-07
Payer: MEDICARE

## 2018-02-07 ENCOUNTER — HOSPITAL ENCOUNTER (INPATIENT)
Facility: CLINIC | Age: 83
LOS: 3 days | Discharge: NURSING, REHAB OR LONG-TERM CARE | DRG: 690 | End: 2018-02-11
Attending: EMERGENCY MEDICINE | Admitting: INTERNAL MEDICINE
Payer: MEDICARE

## 2018-02-07 VITALS
DIASTOLIC BLOOD PRESSURE: 85 MMHG | SYSTOLIC BLOOD PRESSURE: 145 MMHG | RESPIRATION RATE: 16 BRPM | HEART RATE: 99 BPM | OXYGEN SATURATION: 98 % | TEMPERATURE: 97.5 F

## 2018-02-07 DIAGNOSIS — N39.0 URINARY TRACT INFECTION WITH HEMATURIA, SITE UNSPECIFIED: ICD-10-CM

## 2018-02-07 DIAGNOSIS — M62.81 GENERALIZED MUSCLE WEAKNESS: ICD-10-CM

## 2018-02-07 DIAGNOSIS — R11.0 NAUSEA: ICD-10-CM

## 2018-02-07 DIAGNOSIS — R10.31 ABDOMINAL PAIN, RIGHT LOWER QUADRANT: ICD-10-CM

## 2018-02-07 DIAGNOSIS — R10.9 ABDOMINAL PAIN, RIGHT LATERAL: Primary | ICD-10-CM

## 2018-02-07 DIAGNOSIS — R53.1 WEAKNESS GENERALIZED: ICD-10-CM

## 2018-02-07 DIAGNOSIS — R31.9 URINARY TRACT INFECTION WITH HEMATURIA, SITE UNSPECIFIED: ICD-10-CM

## 2018-02-07 DIAGNOSIS — K29.00 OTHER ACUTE GASTRITIS WITHOUT HEMORRHAGE: ICD-10-CM

## 2018-02-07 PROCEDURE — 83690 ASSAY OF LIPASE: CPT | Performed by: EMERGENCY MEDICINE

## 2018-02-07 PROCEDURE — 99207 ZZC OFFICE-HOSPITAL ADMIT: CPT | Performed by: NURSE PRACTITIONER

## 2018-02-07 PROCEDURE — 80053 COMPREHEN METABOLIC PANEL: CPT | Performed by: EMERGENCY MEDICINE

## 2018-02-07 PROCEDURE — 85025 COMPLETE CBC W/AUTO DIFF WBC: CPT | Performed by: EMERGENCY MEDICINE

## 2018-02-07 PROCEDURE — 96361 HYDRATE IV INFUSION ADD-ON: CPT

## 2018-02-07 PROCEDURE — 84484 ASSAY OF TROPONIN QUANT: CPT | Performed by: EMERGENCY MEDICINE

## 2018-02-07 PROCEDURE — 25000128 H RX IP 250 OP 636: Performed by: EMERGENCY MEDICINE

## 2018-02-07 PROCEDURE — 99285 EMERGENCY DEPT VISIT HI MDM: CPT | Mod: 25

## 2018-02-07 PROCEDURE — 96374 THER/PROPH/DIAG INJ IV PUSH: CPT | Mod: 59

## 2018-02-07 RX ORDER — SODIUM CHLORIDE 9 MG/ML
1000 INJECTION, SOLUTION INTRAVENOUS CONTINUOUS
Status: DISCONTINUED | OUTPATIENT
Start: 2018-02-07 | End: 2018-02-09

## 2018-02-07 RX ADMIN — SODIUM CHLORIDE 1000 ML: 9 INJECTION, SOLUTION INTRAVENOUS at 23:47

## 2018-02-07 ASSESSMENT — ENCOUNTER SYMPTOMS
ABDOMINAL PAIN: 1
NAUSEA: 1
FEVER: 0
DIARRHEA: 1
VOMITING: 1
APPETITE CHANGE: 1
FATIGUE: 1

## 2018-02-07 NOTE — IP AVS SNAPSHOT
"[place] [address] [facility]: [phone]                                              INTERAGENCY TRANSFER FORM - LAB / IMAGING / EKG / EMG RESULTS   2018                    Hospital Admission Date: 2018  SID AUGIAR   : 1928  Sex: Male        Attending Provider: Blaze Martinez MD     Allergies:  Oxycodone, Sulfa Drugs, Tetracycline    Infection:  None   Service:  HOSPITALIST    Ht:  1.651 m (5' 5\")   Wt:  69.2 kg (152 lb 8 oz)   Admission Wt:  72.6 kg (160 lb)    BMI:  25.38 kg/m 2   BSA:  1.78 m 2            Patient PCP Information     Provider PCP Type    Matt Morrissey MD General    Matt Morrissey MD Internal Medicine         Lab Results - 3 Days      Urine Culture Aerobic Bacterial [[id number]] (Abnormal)  Resulted: 18 1321, Result status: Preliminary result    Ordering provider: Trierweiler, Chad A, MD  18 0239 Resulting lab: [facility]    Specimen Information    Type Source Collected On   Midstream Urine  18 0218          Components       Value Reference Range Flag Lab   Specimen Description Midstream Urine      Special Requests Specimen received in preservative   75   Culture Micro --  A 225   Result:         >100,000 colonies/mL  Enterococcus faecalis     Culture Micro --  A 225   Result:         >100,000 colonies/mL  Strain 2  Enterococcus faecalis     Culture Micro Susceptibility testing in progress   225   Result:              Glucose by meter [[id number]] (Abnormal)  Resulted: 18 1156, Result status: Final result    Ordering provider: Blaze Martinez MD  18 1149 Resulting lab: POINT OF CARE TEST, GLUCOSE    Specimen Information    Type Source Collected On     18 1149          Components       Value Reference Range Flag Lab   Glucose 121 70 - 99 mg/dL H 170            Glucose by meter [[id number]] (Abnormal)  Resulted: 18 0756, Result status: Final result    Ordering provider: " Blaze Martinez MD  02/11/18 0748 Resulting lab: POINT OF CARE TEST, GLUCOSE    Specimen Information    Type Source Collected On     02/11/18 0748          Components       Value Reference Range Flag Lab   Glucose 131 70 - 99 mg/dL H 170            Glucose by meter [[id number]] (Abnormal)  Resulted: 02/11/18 0726, Result status: Final result    Ordering provider: Blaze Martinez MD  02/11/18 0721 Resulting lab: POINT OF CARE TEST, GLUCOSE    Specimen Information    Type Source Collected On     02/11/18 0721          Components       Value Reference Range Flag Lab   Glucose 131 70 - 99 mg/dL H 170            Glucose by meter [[id number]] (Abnormal)  Resulted: 02/11/18 0336, Result status: Final result    Ordering provider: Blaze Martinez MD  02/11/18 0236 Resulting lab: POINT OF CARE TEST, GLUCOSE    Specimen Information    Type Source Collected On     02/11/18 0236          Components       Value Reference Range Flag Lab   Glucose 131 70 - 99 mg/dL H 170            Glucose by meter [[id number]] (Abnormal)  Resulted: 02/10/18 2131, Result status: Final result    Ordering provider: Blaze Martinez MD  02/10/18 2118 Resulting lab: POINT OF CARE TEST, GLUCOSE    Specimen Information    Type Source Collected On     02/10/18 2118          Components       Value Reference Range Flag Lab   Glucose 131 70 - 99 mg/dL H 170            Glucose by meter [[id number]] (Abnormal)  Resulted: 02/10/18 1736, Result status: Final result    Ordering provider: Blaze Martinez MD  02/10/18 1716 Resulting lab: POINT OF CARE TEST, GLUCOSE    Specimen Information    Type Source Collected On     02/10/18 1716          Components       Value Reference Range Flag Lab   Glucose 153 70 - 99 mg/dL H 170            Glucose by meter [[id number]] (Abnormal)  Resulted: 02/10/18 1201, Result status: Final result    Ordering provider: Blaze Martinez MD  02/10/18 1157 Resulting lab: POINT OF CARE TEST, GLUCOSE    Specimen Information     Type Source Collected On     02/10/18 1157          Components       Value Reference Range Flag Lab   Glucose 177 70 - 99 mg/dL H 170            Glucose by meter [[id number]] (Abnormal)  Resulted: 02/10/18 0746, Result status: Final result    Ordering provider: Blaze Martinez MD  02/10/18 0743 Resulting lab: POINT OF CARE TEST, GLUCOSE    Specimen Information    Type Source Collected On     02/10/18 0743          Components       Value Reference Range Flag Lab   Glucose 128 70 - 99 mg/dL H 170            Glucose by meter [[id number]] (Abnormal)  Resulted: 02/10/18 0245, Result status: Final result    Ordering provider: Blaze Martinez MD  02/10/18 0137 Resulting lab: POINT OF CARE TEST, GLUCOSE    Specimen Information    Type Source Collected On     02/10/18 0137          Components       Value Reference Range Flag Lab   Glucose 108 70 - 99 mg/dL H 170            Glucose by meter [[id number]] (Abnormal)  Resulted: 02/09/18 2121, Result status: Final result    Ordering provider: Trierweiler, Chad A, MD  02/09/18 2110 Resulting lab: POINT OF CARE TEST, GLUCOSE    Specimen Information    Type Source Collected On     02/09/18 2110          Components       Value Reference Range Flag Lab   Glucose 146 70 - 99 mg/dL H 170            Glucose by meter [[id number]] (Abnormal)  Resulted: 02/09/18 1731, Result status: Final result    Ordering provider: Trierweiler, Chad A, MD  02/09/18 1723 Resulting lab: POINT OF CARE TEST, GLUCOSE    Specimen Information    Type Source Collected On     02/09/18 1723          Components       Value Reference Range Flag Lab   Glucose 152 70 - 99 mg/dL H 170            Glucose by meter [[id number]] (Abnormal)  Resulted: 02/09/18 1116, Result status: Final result    Ordering provider: Trierweiler, Chad A, MD  02/09/18 1112 Resulting lab: POINT OF CARE TEST, GLUCOSE    Specimen Information    Type Source Collected On     02/09/18 1112          Components       Value Reference Range Flag  Lab   Glucose 127 70 - 99 mg/dL H 170            Basic metabolic panel [[id number]] (Abnormal)  Resulted: 02/09/18 0859, Result status: Final result    Ordering provider: Blaze Martinez MD  02/09/18 0000 Resulting lab: [facility]    Specimen Information    Type Source Collected On   Blood  02/09/18 0831          Components       Value Reference Range Flag Lab   Sodium 139 133 - 144 mmol/L  FrStHsLb   Potassium 4.1 3.4 - 5.3 mmol/L  FrStHsLb   Chloride 107 94 - 109 mmol/L  FrStHsLb   Carbon Dioxide 26 20 - 32 mmol/L  FrStHsLb   Anion Gap 6 3 - 14 mmol/L  FrStHsLb   Glucose 145 70 - 99 mg/dL H FrStHsLb   Urea Nitrogen 10 7 - 30 mg/dL  FrStHsLb   Creatinine 0.73 0.66 - 1.25 mg/dL  FrStHsLb   GFR Estimate >90 >60 mL/min/1.7m2  FrStHsLb   Comment:  Non  GFR Calc   GFR Estimate If Black >90 >60 mL/min/1.7m2  FrStHsLb   Comment:  African American GFR Calc   Calcium 8.2 8.5 - 10.1 mg/dL L FrStHsLb            CBC with platelets [[id number]] (Abnormal)  Resulted: 02/09/18 0846, Result status: Final result    Ordering provider: Blaze Martinez MD  02/09/18 0000 Resulting lab: [facility]    Specimen Information    Type Source Collected On   Blood  02/09/18 0831          Components       Value Reference Range Flag Lab   WBC 6.4 4.0 - 11.0 10e9/L  FrStHsLb   RBC Count 3.98 4.4 - 5.9 10e12/L L FrStHsLb   Hemoglobin 12.0 13.3 - 17.7 g/dL L FrStHsLb   Hematocrit 37.1 40.0 - 53.0 % L FrStHsLb   MCV 93 78 - 100 fl  FrStHsLb   MCH 30.2 26.5 - 33.0 pg  FrStHsLb   MCHC 32.3 31.5 - 36.5 g/dL  FrStHsLb   RDW 13.7 10.0 - 15.0 %  FrStHsLb   Platelet Count 142 150 - 450 10e9/L L [facility]            Glucose by meter [[id number]] (Abnormal)  Resulted: 02/09/18 0741, Result status: Final result    Ordering provider: Trierweiler, Chad A, MD  02/09/18 0737 Resulting lab: POINT OF CARE TEST, GLUCOSE    Specimen Information    Type Source Collected On     02/09/18 0737          Components        Value Reference Range Flag Lab   Glucose 117 70 - 99 mg/dL H 170            Glucose by meter [[id number]] (Abnormal)  Resulted: 02/09/18 0216, Result status: Final result    Ordering provider: Trierweiler, Chad A, MD  02/09/18 0131 Resulting lab: POINT OF CARE TEST, GLUCOSE    Specimen Information    Type Source Collected On     02/09/18 0131          Components       Value Reference Range Flag Lab   Glucose 118 70 - 99 mg/dL H 170            Glucose by meter [[id number]] (Abnormal)  Resulted: 02/08/18 1806, Result status: Final result    Ordering provider: Trierweiler, Chad A, MD  02/08/18 1706 Resulting lab: POINT OF CARE TEST, GLUCOSE    Specimen Information    Type Source Collected On     02/08/18 1706          Components       Value Reference Range Flag Lab   Glucose 102 70 - 99 mg/dL H 170            Glucose by meter [[id number]] (Abnormal)  Resulted: 02/08/18 1146, Result status: Final result    Ordering provider: Trierweiler, Chad A, MD  02/08/18 1136 Resulting lab: POINT OF CARE TEST, GLUCOSE    Specimen Information    Type Source Collected On     02/08/18 1136          Components       Value Reference Range Flag Lab   Glucose 177 70 - 99 mg/dL H 170            Glucose by meter [[id number]] (Abnormal)  Resulted: 02/08/18 0811, Result status: Final result    Ordering provider: Trierweiler, Chad A, MD  02/08/18 0806 Resulting lab: POINT OF CARE TEST, GLUCOSE    Specimen Information    Type Source Collected On     02/08/18 0806          Components       Value Reference Range Flag Lab   Glucose 156 70 - 99 mg/dL H 170            UA with Microscopic [[id number]] (Abnormal)  Resulted: 02/08/18 0233, Result status: Final result    Ordering provider: Trierweiler, Chad A, MD  02/07/18 2347 Resulting lab: [facility]    Specimen Information    Type Source Collected On   Midstream Urine Urine clean catch 02/08/18 0218          Components       Value Reference Range Flag Lab   Color Urine Yellow   FrStHsLb    Appearance Urine Cloudy   FrStHsLb   Glucose Urine Negative NEG^Negative mg/dL  FrStHsLb   Bilirubin Urine Negative NEG^Negative  FrStHsLb   Ketones Urine Negative NEG^Negative mg/dL  FrStHsLb   Specific [place] Urine 1.020 1.003 - 1.035  FrStHsLb   Blood Urine Small NEG^Negative A FrStHsLb   pH Urine 6.5 5.0 - 7.0 pH  FrStHsLb   Protein Albumin Urine 100 NEG^Negative mg/dL A FrStHsLb   Urobilinogen mg/dL Normal 0.0 - 2.0 mg/dL  FrStHsLb   Nitrite Urine Negative NEG^Negative  FrStHsLb   Leukocyte Esterase Urine Large NEG^Negative A FrStHsLb   Source Midstream Urine   FrStHsLb   WBC Urine >182 0 - 2 /HPF H FrStHsLb   RBC Urine 119 0 - 2 /HPF H FrStHsLb   WBC Clumps Present NEG^Negative /HPF A FrStHsLb   Bacteria Urine Many NEG^Negative /HPF A FrStHsLb            Testing Performed By     Lab - Abbreviation Name Director Address Valid Date Range    14 - FrStHsLb [facility] Unknown [address] [place] [address] 05/08/15 1057 - Present    75 - Unknown [facility] Unknown [address] [facility] [address] 01/15/15 1019 - Present    170 - Unknown POINT OF CARE TEST, GLUCOSE Unknown Unknown 10/31/11 1114 - Present    225 - Unknown [facility] Unknown [address] [facility] [address] 12/19/14 0954 - Present            Unresulted Labs     None         Imaging Results - 3 Days      CT Abdomen Pelvis w Contrast [[id number]]  Resulted: 02/08/18 0549, Result status: Final result    Ordering provider: Trierweiler, Chad A, MD  02/08/18 0051 Resulted by: Jacob Martin MD    Performed: 02/08/18 0136 - 02/08/18 0149 Resulting lab: RADIOLOGY RESULTS    Narrative:       CT ABDOMEN PELVIS W CONTRAST  2/8/2018 1:49 AM     HISTORY: Right-sided abdominal pain.    TECHNIQUE: CT abdomen and pelvis with 81mL Isovue-370 IV. Radiation  dose for this scan was reduced using automated exposure control,  adjustment of the mA and/or kV according to  patient size, or iterative  reconstruction technique.    COMPARISON: 12/25/2016.    FINDINGS:  Abdomen: Mild fibrosis at the lung bases. The liver, spleen, pancreas  and adrenal glands are normal in appearance. The gallbladder is  absent. There are a few stones within both kidneys. No ureteral stone  or hydronephrosis. There is a 5.5 cm cyst at the upper pole of the  left kidney. There is no abdominal or pelvic lymph node enlargement.  There is atherosclerotic calcification of the aorta and its branches.  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    Pelvis: There are colonic diverticula without acute diverticulitis. No  bowel obstruction or inflammation. The appendix is normal. No free  intraperitoneal gas or fluid. 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  Bilateral L5 pars interarticularis defects.      Impression:       IMPRESSION:  1. No acute abnormality. No bowel obstruction or inflammation.  2. Colonic diverticula without acute diverticulitis.  3. Bilateral intrarenal stones. No ureteral stone or hydronephrosis.    WILFRED SALAZAR MD      Testing Performed By     Lab - Abbreviation Name Director Address Valid Date Range    104 - Rad Rslts RADIOLOGY RESULTS Unknown Unknown 02/16/05 1553 - Present            Encounter-Level Documents:     There are no encounter-level documents.      Order-Level Documents:     There are no order-level documents.

## 2018-02-07 NOTE — IP AVS SNAPSHOT
"` `           [place] [address] [facility]: [phone]                                              INTERAGENCY TRANSFER FORM - NURSING   2018                    Hospital Admission Date: 2018  SID AGUIAR   : 1928  Sex: Male        Attending Provider: Blaze Martinez MD     Allergies:  Oxycodone, Sulfa Drugs, Tetracycline    Infection:  None   Service:  HOSPITALIST    Ht:  1.651 m (5' 5\")   Wt:  69.2 kg (152 lb 8 oz)   Admission Wt:  72.6 kg (160 lb)    BMI:  25.38 kg/m 2   BSA:  1.78 m 2            Patient PCP Information     Provider PCP Type    Matt Morrissey MD General    Matt Morrissey MD Internal Medicine      Current Code Status     Date Active Code Status Order ID Comments User Context       Prior      Code Status History     Date Active Date Inactive Code Status Order ID Comments User Context    2018 12:16 PM  Full Code [id number]  Matt Purcell MD Outpatient    2018  5:21 AM 2018 12:16 PM Full Code [id number]  Blaze Martinez MD Inpatient    2018  3:04 PM 2018  5:21 AM Full Code [id number]  Gagan Jerome MD Outpatient    2017  1:57 PM 2018  3:04 PM Full Code [id number]  Meseret Tidwell MD Inpatient    2017 10:07 AM 2017  1:57 PM Full Code [id number]  Faustino Aguilar MD Outpatient    2017  8:56 PM 2017 10:07 AM Full Code [id number]  Blaze Torres MD Inpatient    2017  3:57 PM 2017  8:56 PM Full Code [id number]  Tra Reynoso MD Outpatient    2017  1:55 PM 2017  3:57 PM Full Code [id number]  Blaze Rose MD Inpatient    2016  1:26 PM 2017  1:55 PM Full Code [id number]  Paloma Chen MD Outpatient    2016  3:13 AM 2016  1:26 PM Full Code [id number]  Faustino Aguilar MD Inpatient    3/2/2012 11:45 AM 2016  3:13 AM Full Code [id number]  Wilmar Medrano MD Outpatient      Advance Directives        Scanned docmt in ACP " Activity?           No scanned doc        Hospital Problems as of 2/11/2018              Priority Class Noted POA    UTI (urinary tract infection) Medium  2/8/2018 Yes      Non-Hospital Problems as of 2/11/2018              Priority Class Noted    Coronary artery disease involving native coronary artery of native heart without angina pectoris Medium  10/20/2016    Mild cognitive impairment Medium  10/20/2016    Hyperlipidemia LDL goal <70 Medium  10/20/2016    Benign non-nodular prostatic hyperplasia with lower urinary tract symptoms Medium  10/20/2016    Gastroesophageal reflux disease without esophagitis Medium  10/20/2016    Cerebrovascular accident (H) Medium  10/20/2016    Carotid artery stenosis Medium  10/20/2016    Abdominal aortic aneurysm (H) Medium  12/25/2016    Lumbar back pain Medium  12/30/2016    Benign essential hypertension Medium  1/6/2017    TIA (transient ischemic attack) Medium  1/20/2017    Paroxysmal atrial fibrillation (H) Medium  Unknown    Ischemic cardiomyopathy Medium  Unknown    Aortic root dilatation (H) Medium  Unknown    Physical deconditioning Medium  6/12/2017    Diabetic ulcer of left foot associated with diabetes mellitus due to underlying condition (H) Medium  6/12/2017    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) Medium  6/12/2017    Anemia due to blood loss, acute Medium  6/13/2017    Diarrhea, unspecified type Medium  6/16/2017    Nausea Medium  6/21/2017    Acute pain of left shoulder Medium  8/7/2017    Balance problems Medium  8/7/2017    Generalized muscle weakness Medium  8/7/2017    PAD (peripheral artery disease) (H) Medium  Unknown    Aortic stenosis Medium  Unknown    RBBB with left anterior fascicular block Medium  Unknown    Stroke of unknown etiology (H) Medium  12/31/2017    Carotid stenosis Medium  1/3/2018    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) Medium  1/5/2018    History of TIA (transient ischemic attack) and stroke  Medium  1/5/2018      Immunizations     Name Date      Influenza (High Dose) 3 valent vaccine 11/03/17     Influenza (High Dose) 3 valent vaccine 10/16/16     Pneumo Conj 13-V (2010&after) 12/05/14     Pneumococcal 23 valent 01/01/00     TD (ADULT, 7+) 11/01/10     Zoster vaccine, live 01/01/12          END      ASSESSMENT     Discharge Profile Flowsheet     EXPECTED DISCHARGE     Resources List  -- (n/a) 01/25/18 0950    Expected Discharge Date  02/10/18 (tcu vs home) 02/10/18 1355   Other Resources  -- (NA) 01/25/18 0950    DISCHARGE NEEDS ASSESSMENT     PAS Number  -- (n/a) 01/25/18 0950    Concerns To Be Addressed  -- (NA) 07/14/17 1612   Senior Linkage Line Referral Placed  -- (n/a) 01/25/18 0950    Concerns Comments  -- (NA) 07/14/17 1612   F/U Appointment Brochure Provided  -- (NA) 01/25/18 0950    Equipment Currently Used at Home  grab bar 02/08/18 1530   Referrals Placed  Senior Linkage Line 01/25/18 0950    Transportation Available  family or friend will provide (signficant other drives) 02/08/18 1530   SKIN      # of Referrals Placed by CTS  Post Acute Facilities;Transportation;Senior Linkage Line 02/11/18 1239   Inspection of bony prominences  Full 02/11/18 1127    Does Patient Need a Referral for Clinic CC  Yes 12/29/16 1100   Inspection under devices  Full 02/11/18 0150    GASTROINTESTINAL (ADULT,PEDIATRIC,OB)     Skin WDL  ex 02/11/18 1127    GI WDL  WDL 02/11/18 1127   Skin Color/Characteristics  bruised (ecchymotic) 02/11/18 1127    All Quadrants Bowel Sounds  audible and normoactive 02/11/18 1127   Skin Temperature  warm 02/11/18 0150    All Quadrants Palpation  soft/nontender 02/08/18 0228   Skin Moisture  dry 02/11/18 1127    Last Bowel Movement  02/10/18 02/10/18 2307   Skin Elasticity  slow return to original state 02/11/18 0150    GI Signs/Symptoms  constipation;other (see comments) (gave miralax at 2 am) 02/10/18 0211   Skin Integrity  bruise(s) 02/11/18 1127    Passing flatus  yes 02/11/18  "1127   SAFETY      COMMUNICATION ASSESSMENT     Safety WDL  WDL 02/11/18 1127    Patient's communication style  spoken language (English or Bilingual) 02/08/18 0435   Safety Factors  ID band on;upper side rails raised x 2;call light in reach;wheels locked;bed in low position 02/11/18 0150    FINAL RESOURCES     All Alarms  alarm(s) activated and audible 02/11/18 1127                 Assessment WDL (Within Defined Limits) Definitions           Safety WDL     Effective: 09/28/15    Row Information: <b>WDL Definition:</b> Bed in low position, wheels locked; call light in reach; upper side rails up x 2; ID band on<br> <font color=\"gray\"><i>Item=AS safety wdl>>List=AS safety wdl>>Version=F14</i></font>      Skin WDL     Effective: 09/28/15    Row Information: <b>WDL Definition:</b> Warm; dry; intact; elastic; without discoloration; pressure points without redness<br> <font color=\"gray\"><i>Item=AS skin wdl>>List=AS skin wdl>>Version=F14</i></font>      Vitals     Vital Signs Flowsheet     QUICK ADDS     Height Method  Stated 02/08/18 0456    Quick Adds  Comments 02/08/18 1032   Height Method  Stated 02/07/18 2147    COMMENTS     Weight  69.2 kg (152 lb 8 oz) 02/08/18 0422    Comments  following activity with PT 02/08/18 1032   Weight Method  Bed scale 02/08/18 0422    VITAL SIGNS     BSA (Calculated - sq m)  1.78 02/08/18 0422    Temp  98.1  F (36.7  C) 02/11/18 0744   BMI (Calculated)  25.43 02/08/18 0422    Temp src  Oral 02/11/18 0744   DAILY CARE      Resp  18 02/11/18 0744   Activity Management  activity adjusted per tolerance;ambulated in room;ambulated to bathroom 02/11/18 1222    Pulse  73 02/10/18 1521   Activity Assistance Provided  assistance, 1 person 02/11/18 1222    Heart Rate  84 02/11/18 0744   Assistive Device Utilized  gait belt;walker 02/11/18 0946    Pulse/Heart Rate Source  Monitor 02/11/18 0744   CLINICALLY ALIGNED PAIN ASSESSMENT (CAPA) ([facility], [facility] AND [facility] ADULTS ONLY)      BP  117/61 " "02/11/18 0744   Comfort  tolerable with discomfort 02/07/18 2147    BP Location  Right arm 02/11/18 0744   SOO COMA SCALE      OXYGEN THERAPY     Best Eye Response  4-->(E4) spontaneous 02/10/18 0057    SpO2  95 % 02/11/18 0744   Best Motor Response  6-->(M6) obeys commands 02/10/18 0057    O2 Device  None (Room air) 02/11/18 0744   Best Verbal Response  5-->(V5) oriented 02/10/18 0057    PAIN/COMFORT     [place] Coma Scale Score  15 02/10/18 0057    Patient Currently in Pain  denies 02/11/18 0027   POSITIONING      Preferred Pain Scale  number (Numeric Rating Pain Scale) 02/11/18 0027   Body Position  sitting up in bed 02/11/18 1222    Patient's Stated Pain Goal  No pain 02/11/18 0027   Chair  Upright in chair 02/11/18 0739    0-10 Pain Scale  0 02/11/18 0027   Positioning/Transfer Devices  pillows;in use 02/11/18 0703    HEIGHT AND WEIGHT     Head of Bed (HOB)  HOB at 45 degrees 02/11/18 1222    Height  1.651 m (5' 5\") 02/08/18 0422                 Patient Lines/Drains/Airways Status    Active LINES/DRAINS/AIRWAYS     Name: Placement date: Placement time: Site: Days: Last dressing change:    Peripheral IV 02/10/18 Right Lower forearm 02/10/18   1117   Lower forearm   1     Incision/Surgical Site 06/04/17 Left Foot 06/04/17       252     Incision/Surgical Site 06/06/17 Left Foot 06/06/17   1922    249     Incision/Surgical Site 01/03/18 Right Neck 01/03/18   1725    38             Patient Lines/Drains/Airways Status    Active PICC/CVC     None            Intake/Output Detail Report     None      Last Void/BM       Most Recent Value    Urine Occurrence 1 at 02/11/2018 1222    Stool Occurrence 1 at 02/11/2018 0700      Case Management/Discharge Planning     Case Management/Discharge Planning Flowsheet     REFERRAL INFORMATION     Patient Personal Strengths  able to adapt 06/06/17 1517    Admission Type  inpatient 02/10/18 1450   EXPECTED DISCHARGE      Arrived From  home or self-care 01/02/18 1603   Expected " Discharge Date  02/10/18 (tcu vs home) 02/10/18 1355    Referral Source  physician 02/10/18 1450   ASSESSMENT/CONCERNS TO BE ADDRESSED      # of Referrals Placed by CTS  Post Acute Facilities;Transportation;Senior Linkage Line 02/11/18 1239   Concerns To Be Addressed  -- (NA) 07/14/17 1612    Post Acute Facilities  TCU 02/11/18 1239   Concerns Comments  -- (NA) 07/14/17 1612    Reason For Consult  discharge planning;facility placement 02/10/18 1450   DISCHARGE PLANNING      Record Reviewed  clinical discipline documentation;history and physical;medical record;patient profile 02/10/18 1450   Transportation Available  family or friend will provide (signficant other drives) 02/08/18 1530    CTS Assigned to Case  Jacque Wise 02/11/18 1239   Does Patient Need a Referral for Clinic CC  Yes 12/29/16 1100    Primary Care Clinic Name  [facility]  06/09/17 1343   FINAL RESOURCES      Primary Care MD Name  Matt Morrissey  06/09/17 1343   Equipment Currently Used at Home  grab bar 02/08/18 1530    LIVING ENVIRONMENT     Resources List  -- (n/a) 01/25/18 0950    Lives With  significant other 02/10/18 1450   Other Resources  -- (NA) 01/25/18 0950    Living Arrangements  house 02/10/18 1450   PAS Number  -- (n/a) 01/25/18 0950    Provides Primary Care For  no one 02/10/18 1450   Senior Linkage Line Referral Placed  -- (n/a) 01/25/18 0950    Quality Of Family Relationships  involved 02/10/18 1450   F/U Appointment Brochure Provided  -- (NA) 01/25/18 0950    ASSESSMENT OF FAMILY/SOCIAL SUPPORT     Referrals Placed  Senior Linkage Line 01/25/18 0950    Marital Status  Lives with Significant Other 02/10/18 1450   ABUSE RISK SCREEN      Description of Support System  Involved 02/10/18 1450   QUESTION TO PATIENT:  Has a member of your family or a partner(now or in the past) intimidated, hurt, manipulated, or controlled you in any way?  no 02/07/18 2148    Support Assessment  Adequate family and caregiver support 02/10/18 1450   OTHER       Quality of Family Relationships  involved 02/10/18 1459   Are you depressed or being treated for depression?  No 02/08/18 0435    COPING/STRESS     HOMICIDE RISK      Major Change/Loss/Stressor  illness 02/08/18 0935   Feels Like Hurting Others  no 02/07/18 4592

## 2018-02-07 NOTE — IP AVS SNAPSHOT
` ` Patient Information     Patient Name Sex     Dustin Pearce ([id number]) Male 1928       Room Bed    [facility] 6622-01      Patient Demographics     Address Phone E-mail Address    [address] [address] [address] [phone] (Home)  none (Work)  none (Mobile) [email or web address]      Patient Ethnicity & Race     Ethnic Group Patient Race    American White      Emergency Contact(s)     Name Relation Home Work Mobile    Halina Dhillon Friend none [phone] none      Documents on File        Status Date Received Description       Documents for the Patient    Privacy Notice - [place] Received 17     Insurance Card  () 03     Face Sheet  () 03     External Medication Information Consent Accepted () 10/24/10     Patient ID Received () 12     Consent for Services - Hospital/Clinic Received () 10/24/10     Insurance Card  ()      Insurance Card Received () 12     Consent for Services - Hospital/Clinic Received () 12     Consent for Services - Hospital/Clinic Received () 12     Consent for EHR Access  13 Copied from existing Consent for services - C/HOD collected on 2012    [facility] Specified Other       Consent for Services - Hospital/Clinic Received () 13     Insurance Card Received () 13     Patient ID Received () 13     Consent to Communicate Received 14 phi    Consent for Services - Hospital/Clinic Received () 14     Consent for Services/Privacy Notice - Hospital/Clinic Received () 07/10/16     Insurance Card Received 16 Medicare    Insurance Card Received 16 bcbs    Patient ID Received 16 [place] ID - [facility]    External Medication Information Consent Accepted 10/20/16     Speech Therapy Certification Received 12/15/16 Video swallow study    Insurance Card Received 17 Medicare / BCBS [place]     Consent for Services/Privacy Notice - Hospital/Clinic Received 07/24/17     Care Everywhere Prospective Auth Received 07/24/17     HIM SAVANAH Authorization  01/05/18        Documents for the Encounter    CMS IM for Patient Signature Received 02/08/18     CMS IM for Patient Signature Received 02/11/18 2MM    Discharge Attachment  (Deleted)  WEAKNESS (UNCERTAIN CAUSE) (ENGLISH)      Admission Information     Attending Provider Admitting Provider Admission Type Admission Date/Time    Blaze Martinez MD Dasari, Paul Dayan, MD Emergency 02/07/18  2318    Discharge Date Hospital Service Auth/Cert Status Service Area     Hospitalist Incomplete [facility] SERVICES    Unit Room/Bed Admission Status       [address] SPEC UNIT 6622/6622-01 Admission (Confirmed)       Admission     Complaint    UTI (urinary tract infection)      Hospital Account     Name Acct ID Class Status Primary Coverage    Dustin Pearce [id number] Inpatient Open MEDICARE - MEDICARE            Guarantor Account (for Hospital Account #[id number])     Name Relation to Pt Service Area Active? Acct Type    Dustin Pearce  FCS Yes Personal/Family    Address Phone          [address] [place] [address] [phone](H)  none(O)              Coverage Information (for Hospital Account #[id number])     1. MEDICARE/MEDICARE     F/O Payor/Plan Precert #    MEDICARE/MEDICARE     Subscriber Subscriber #    Dustin Pearce [id number]    Address Phone    [employer] CLAIM[employer]  [address]  [place] [address] [phone]          2. [place]     F/O Payor/Plan Precert #    [place]     Subscriber Subscriber #    Dustin Pearce [id number]    Address Phone    [address] [address]  [place], [place] [id number] [phone]

## 2018-02-07 NOTE — LETTER
Transition Communication Hand-off for Care Transitions to Next Level of Care Provider    Name: Dustin Pearce  MRN #: [id number]  Primary Care Provider: Matt Morrissey  Primary Care MD Name: Matt Morrissey  Primary Clinic: [facility] [address]  [place] [address]  Primary Care Clinic Name: [facility]  Reason for Hospitalization:  Abdominal pain, right lower quadrant [R10.31]  Generalized muscle weakness [M62.81]  Urinary tract infection with hematuria, site unspecified [N39.0, R31.9]  Admit Date/Time: 2/7/2018 11:18 PM  Discharge Date: 2/11/18 to [facility] TCU  Payor Source: Payor: MEDICARE / Plan: MEDICARE / Product Type: Medicare /     Reason for Communication Hand-off Referral: Fragility  Difficulty understanding plan of care  Multiple providers/specialties    Discharge Plan: Patient admitted with UTI and multiple falls.  Patient was assessed by PT/OT regarding d/c recommendations and patient discharged to TCU on 2/11.  Patient does have a follow up appointment with Dr. Morrissey week of 2/12 and wife will be calling the clinic tomorrow to determine whether or not they should keep this appointment until discharge from TCU or cancel/reschedule when patient does discharge from TCU.       Concern for non-adherence with plan of care:   Y/N n  Discharge Needs Assessment:  Needs       Most Recent Value    Equipment Currently Used at Home grab bar    Transportation Available family or friend will provide [signficant other drives]    # of Referrals Placed by CTS Communication hand-offs to next level of Care Providers          Follow-up plan:  Future Appointments  Date Time Provider Department Center   2/15/2018 6:00 PM Matt Morrissey MD [facility]    4/19/2018 11:00 AM [id number]   4/19/2018 11:45 AM Roland Rodriguez MD [facility]       Any outstanding tests or procedures:        Referrals     Future Labs/Procedures    Occupational Therapy Adult Consult     Comments:     Evaluate and treat as clinically indicated.    Reason:   Weakness and Physical Deconditioning    Physical Therapy Adult Consult     Comments:    Evaluate and treat as clinically indicated.    Reason:  Weakness and Physical Deconditioning            Key Recommendations:      Lexis Michael    AVS/Discharge Summary is the source of truth; this is a helpful guide for improved communication of patient story

## 2018-02-07 NOTE — IP AVS SNAPSHOT
"[place] [address] [facility]: [phone]                                              INTERAGENCY TRANSFER FORM - PHYSICIAN ORDERS   2018                    Hospital Admission Date: 2018  SID AGUIAR   : 1928  Sex: Male        Attending Provider: Blaze Martinez MD     Allergies:  Oxycodone, Sulfa Drugs, Tetracycline    Infection:  None   Service:  HOSPITALIST    Ht:  1.651 m (5' 5\")   Wt:  69.2 kg (152 lb 8 oz)   Admission Wt:  72.6 kg (160 lb)    BMI:  25.38 kg/m 2   BSA:  1.78 m 2            Patient PCP Information     Provider PCP Type    Matt Morrissey MD General    Matt Morrissey MD Internal Medicine      ED Clinical Impression     Diagnosis Description Comment Added By Time Added    Generalized muscle weakness [M62.81] Generalized muscle weakness [M62.81]  Trierweiler, Chad A, MD 2018  2:39 AM    Abdominal pain, right lower quadrant [R10.31] Abdominal pain, right lower quadrant [R10.31]  Trierweiler, Chad A, MD 2018  2:39 AM    Urinary tract infection with hematuria, site unspecified [N39.0, R31.9] Urinary tract infection with hematuria, site unspecified [N39.0, R31.9]  Trierweiler, Chad A, MD 2018  2:40 AM      Hospital Problems as of 2018              Priority Class Noted POA    UTI (urinary tract infection) Medium  2018 Yes      Non-Hospital Problems as of 2018              Priority Class Noted    Coronary artery disease involving native coronary artery of native heart without angina pectoris Medium  10/20/2016    Mild cognitive impairment Medium  10/20/2016    Hyperlipidemia LDL goal <70 Medium  10/20/2016    Benign non-nodular prostatic hyperplasia with lower urinary tract symptoms Medium  10/20/2016    Gastroesophageal reflux disease without esophagitis Medium  10/20/2016    Cerebrovascular accident (H) Medium  10/20/2016    Carotid artery stenosis Medium  10/20/2016    Abdominal aortic aneurysm (H) Medium  2016    Lumbar " back pain Medium  12/30/2016    Benign essential hypertension Medium  1/6/2017    TIA (transient ischemic attack) Medium  1/20/2017    Paroxysmal atrial fibrillation (H) Medium  Unknown    Ischemic cardiomyopathy Medium  Unknown    Aortic root dilatation (H) Medium  Unknown    Physical deconditioning Medium  6/12/2017    Diabetic ulcer of left foot associated with diabetes mellitus due to underlying condition (H) Medium  6/12/2017    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) Medium  6/12/2017    Anemia due to blood loss, acute Medium  6/13/2017    Diarrhea, unspecified type Medium  6/16/2017    Nausea Medium  6/21/2017    Acute pain of left shoulder Medium  8/7/2017    Balance problems Medium  8/7/2017    Generalized muscle weakness Medium  8/7/2017    PAD (peripheral artery disease) (H) Medium  Unknown    Aortic stenosis Medium  Unknown    RBBB with left anterior fascicular block Medium  Unknown    Stroke of unknown etiology (H) Medium  12/31/2017    Carotid stenosis Medium  1/3/2018    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) Medium  1/5/2018    History of TIA (transient ischemic attack) and stroke Medium  1/5/2018      Code Status History     Date Active Date Inactive Code Status Order ID Comments User Context    2/11/2018 12:16 PM  Full Code [id number]  Matt Purcell MD Outpatient    2/8/2018  5:21 AM 2/11/2018 12:16 PM Full Code [id number]  Blaze Martinez MD Inpatient    1/4/2018  3:04 PM 2/8/2018  5:21 AM Full Code [id number]  Gagan Jerome MD Outpatient    12/31/2017  1:57 PM 1/4/2018  3:04 PM Full Code [id number]  Meseret Tidwell MD Inpatient    6/9/2017 10:07 AM 12/31/2017  1:57 PM Full Code [id number]  Faustino Aguilar MD Outpatient    5/31/2017  8:56 PM 6/9/2017 10:07 AM Full Code [id number]  Blaze Torres MD Inpatient    1/21/2017  3:57 PM 5/31/2017  8:56 PM Full Code [id number]  Tra Reynoso MD Outpatient    1/20/2017  1:55 PM  1/21/2017  3:57 PM Full Code [id number]  Blaze Rose MD Inpatient    12/28/2016  1:26 PM 1/20/2017  1:55 PM Full Code [id number]  Paloma Chen MD Outpatient    12/25/2016  3:13 AM 12/28/2016  1:26 PM Full Code [id number]  Faustino Aguilar MD Inpatient    3/2/2012 11:45 AM 12/25/2016  3:13 AM Full Code [id number]  Wilmar Medrano MD Outpatient         Medication Review      START taking        Dose / Directions Comments    ampicillin 500 MG capsule   Commonly known as:  PRINCIPEN   Indication:  Urinary Tract Infection, E faecalis UTI   Used for:  Urinary tract infection with hematuria, site unspecified        Dose:  500 mg   Take 1 capsule (500 mg) by mouth every 6 hours for 4 days   Quantity:  16 capsule   Refills:  0          CONTINUE these medications which may have CHANGED, or have new prescriptions. If we are uncertain of the size of tablets/capsules you have at home, strength may be listed as something that might have changed.        Dose / Directions Comments    acetaminophen 325 MG tablet   Commonly known as:  TYLENOL   This may have changed:    - medication strength  - when to take this  - reasons to take this  - additional instructions   Used for:  Generalized muscle weakness, Abdominal pain, right lower quadrant, Urinary tract infection with hematuria, site unspecified        Dose:  650 mg   Take 2 tablets (650 mg) by mouth every 4 hours as needed (fever or pain)   Quantity:  100 tablet   Refills:  0        cholecalciferol 55804 UNITS capsule   Commonly known as:  VITAMIN D3   This may have changed:  additional instructions   Used for:  Vitamin D deficiency   Notes to Patient:  Resume as per orders        Dose:  1 capsule   Take 1 capsule (50,000 Units) by mouth once a week   Quantity:  12 capsule   Refills:  0          CONTINUE these medications which have NOT CHANGED        Dose / Directions Comments    AMITRIPTYLINE HCL PO   Notes to Patient:  Resume as per orders        Dose:  25 mg   Take  25 mg by mouth nightly as needed for sleep   Refills:  0        aspirin 81 MG EC tablet   Used for:  Transient cerebral ischemia, unspecified type        Dose:  81 mg   Take 1 tablet (81 mg) by mouth daily   Quantity:  30 tablet   Refills:  0        ATORVASTATIN CALCIUM PO   Notes to Patient:  Received dose this AM        Dose:  40 mg   Take 40 mg by mouth At Bedtime   Refills:  0        blood glucose monitoring test strip   Commonly known as:  no brand specified   Used for:  Hypoglycemia        Use to test blood sugar three times daily or as directed.   Quantity:  300 each   Refills:  3    True Metrix strips or brand desired by insurance       DULoxetine 30 MG EC capsule   Commonly known as:  CYMBALTA   Used for:  Polyneuropathy associated with underlying disease (H)        Dose:  30 mg   Take 1 capsule (30 mg) by mouth daily   Quantity:  90 capsule   Refills:  3        exenatide 5 MCG/0.02ML Sopn injection   Commonly known as:  BYETTA   Used for:  Type 2 diabetes mellitus with other specified complication, without long-term current use of insulin (H)        Dose:  5 mcg   Inject 5 mcg Subcutaneous 2 times daily (before meals)   Quantity:  1 Syringe   Refills:  0        glipiZIDE 10 MG tablet   Commonly known as:  GLUCOTROL   Used for:  Type 2 diabetes mellitus with diabetic peripheral angiopathy without gangrene, without long-term current use of insulin (H)        Dose:  10 mg   Take 1 tablet (10 mg) by mouth 2 times daily (before meals)   Quantity:  180 tablet   Refills:  3        insulin pen needle 32G X 4 MM   Commonly known as:  BD JOE U/F   Used for:  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   Quantity:  360 each   Refills:  11        ipratropium 0.03 % spray   Commonly known as:  ATROVENT        Dose:  2 spray   Spray 2 sprays into both nostrils 2 times daily as needed   Refills:  0        LISINOPRIL PO        Dose:  2.5 mg   Take 2.5 mg by mouth daily    Refills:  0        MAGNESIUM OXIDE PO   Indication:  Disorder with Low Magnesium Levels   Notes to Patient:  Resume as per orders        Dose:  400 mg   Take 400 mg by mouth 2 times daily   Refills:  0        METFORMIN HCL PO        Dose:  1000 mg   Take 1,000 mg by mouth 2 times daily (with meals)   Refills:  0        metoprolol succinate 25 MG 24 hr tablet   Commonly known as:  TOPROL-XL   Indication:  High Blood Pressure Disorder   Used for:  Cardiomyopathy, unspecified type (H)        Dose:  12.5 mg   Take 0.5 tablets (12.5 mg) by mouth daily   Refills:  0        OMEPRAZOLE PO        Dose:  40 mg   Take 40 mg by mouth daily as needed   Refills:  0        order for DME   Used for:  Type 2 diabetes mellitus without complication (H)        Equipment being ordered: True Matrix Blood Glucose meter.   Quantity:  1 Device   Refills:  0        PLAVIX PO   Used for:  Cough        Dose:  75 mg   Take 75 mg by mouth daily   Refills:  0        polyethylene glycol Packet   Commonly known as:  MIRALAX/GLYCOLAX        Dose:  17 g   Take 17 g by mouth daily as needed for constipation   Refills:  0        tamsulosin 0.4 MG capsule   Commonly known as:  FLOMAX   Used for:  Benign non-nodular prostatic hyperplasia with lower urinary tract symptoms        Dose:  0.4 mg   Take 1 capsule (0.4 mg) by mouth daily   Quantity:  90 capsule   Refills:  3                Summary of Visit     Reason for your hospital stay       UTI with weakness             After Care     Activity - Up with nursing assistance           Advance Diet as Tolerated       Follow this diet upon discharge: Orders Placed This Encounter      Room Service      Snacks/Supplements Adult: Glucerna (Adult); Between Meals      Moderate Consistent CHO Diet       General info for SNF       Length of Stay Estimate: Short Term Care: Estimated # of Days <30  Condition at Discharge: Improving  Level of care:skilled   Rehabilitation Potential: Good  Admission H&P remains valid  and up-to-date: Yes  Recent Chemotherapy: N/A  Use Nursing Home Standing Orders: Yes             Referrals     Occupational Therapy Adult Consult       Evaluate and treat as clinically indicated.    Reason:   Weakness and Physical Deconditioning       Physical Therapy Adult Consult       Evaluate and treat as clinically indicated.    Reason:  Weakness and Physical Deconditioning             Your next 10 appointments already scheduled     Feb 15, 2018  6:00 PM CST   [facility] Visit [facility] Matt Morrissey MD   [facility]    [address] [facility] [address]   [phone]           Bring a current list of meds and any records pertaining to this visit. For Physicals, please bring immunization records and any forms needing to be filled out. Please arrive 10 minutes early to complete paperwork.            Apr 19, 2018 11:00 AM CDT   US CAROTID RIGHT with [email or web address]   [facility] ([facility])    [address]  [facility] [address]   [phone]           Please bring a list of your medicines (including vitamins, minerals and over-the-counter drugs). Also, tell your doctor about any allergies you may have. Wear comfortable clothes and leave your valuables at home.  You do not need to do anything special to prepare for your exam.  Please call the Imaging Department at your exam site with any questions.            Apr 19, 2018 11:45 AM CDT   Return Visit with Roland Rodriguez MD   [facility] ([facility])    [address]  [facility] [address]   [phone]              Statement of Approval     Ordered          02/11/18 1226  I have reviewed and agree with all the recommendations and orders detailed in this document.  EFFECTIVE NOW     Approved and electronically signed by:  Matt Purcell MD

## 2018-02-07 NOTE — IP AVS SNAPSHOT
` `     [place] [address] [facility]: [phone]            Medication Administration Report for Dustin Pearce as of 02/11/18 1144   Legend:    Given Hold Not Given Due Canceled Entry Other Actions    Time Time (Time) Time  Time-Action       Inactive    Active    Linked        Medications 02/05/18 02/06/18 02/07/18 02/08/18 02/09/18 02/10/18 02/11/18    acetaminophen (TYLENOL) tablet 650 mg  Dose: 650 mg  Freq: EVERY 4 HOURS PRN Route: PO  PRN Comment: fever or pain  Start: 02/08/18 0511   Admin Instructions: Maximum acetaminophen dose from all sources = 75 mg/kg/day not to exceed 4 grams/day.               amitriptyline (ELAVIL) tablet 25 mg  Dose: 25 mg  Freq: AT BEDTIME PRN Route: PO  PRN Reason: sleep  Start: 02/08/18 0512              ampicillin (PRINCIPEN) 500 MG capsule 500 mg  Dose: 500 mg  Freq: EVERY 6 HOURS SCHEDULED Route: PO  Indications of Use: URINARY TRACT INFECTION  Indications Comment: E faecalis UTI  Start: 02/11/18 1200   Admin Instructions: Take on an empty stomach.           1210 (500 mg)-Given       [ ] 1800           aspirin EC EC tablet 81 mg  Dose: 81 mg  Freq: DAILY Route: PO  Start: 02/08/18 0900   Admin Instructions: DO NOT CRUSH.        0950 (81 mg)-Given        0916 (81 mg)-Given        0959 (81 mg)-Given        0833 (81 mg)-Given           atorvastatin (LIPITOR) tablet 80 mg  Dose: 80 mg  Freq: DAILY Route: PO  Start: 02/08/18 0900       0949 (80 mg)-Given        0916 (80 mg)-Given        1000 (80 mg)-Given        0833 (80 mg)-Given           clopidogrel (PLAVIX) tablet 75 mg  Dose: 75 mg  Freq: DAILY Route: PO  Start: 02/08/18 0900       0950 (75 mg)-Given        0916 (75 mg)-Given        0959 (75 mg)-Given        0833 (75 mg)-Given           DULoxetine (CYMBALTA) EC capsule 30 mg  Dose: 30 mg  Freq: DAILY Route: PO  Start: 02/08/18 0900       0950 (30 mg)-Given        0916 (30 mg)-Given        0959 (30 mg)-Given        0832 (30 mg)-Given           exenatide  (BYETTA) 5 mcg/0.02mL per dose (60 doses per 1.2 mL prefilled pen)  Dose: 5 mcg  Freq: 2 TIMES DAILY BEFORE MEALS Route: SC  Start: 02/08/18 0730   Admin Instructions: Administer within a 60 minute period prior to meal. Do not administer after the meal. For SQ use only. *Refrigerate*.        0959 (5 mcg)-Given       1726 (5 mcg)-Given        0914 (5 mcg)-Given       1739 (5 mcg)-Given        0804 (5 mcg)-Given       1748 (5 mcg)-Given        0810 (5 mcg)-Given       [ ] 1630           glipiZIDE (GLUCOTROL) tablet 10 mg  Dose: 10 mg  Freq: 2 TIMES DAILY BEFORE MEALS Route: PO  Start: 02/08/18 0730   Admin Instructions: Take before or with meals.        0649 (10 mg)-Given       1708 (10 mg)-Given        0633 (10 mg)-Given       1739 (10 mg)-Given        0637 (10 mg)-Given       1745 (10 mg)-Given        0633 (10 mg)-Given       [ ] 1630           glucose 40 % gel 15-30 g  Dose: 15-30 g  Freq: EVERY 15 MIN PRN Route: PO  PRN Reason: low blood sugar  Start: 02/08/18 0532   Admin Instructions: Give 15 g for BG 51 to 69 mg/dL IF patient is conscious and able to swallow. Give 30 g for BG less than or equal to 50 mg/dL IF patient is conscious and able to swallow. Do NOT give glucose gel via enteral tube.  IF patient has enteral tube: give apple juice 120 mL (4 oz or 15 g of CHO) via enteral tube for BG 51 to 69 mg/dL.  Give apple juice 240 mL (8 oz or 30 g of CHO) via enteral tube for BG less than or equal to 50 mg/dL.    ~Oral gel is preferable for conscious and able to swallow patient.   ~IF gel unavailable or patient refuses may provide apple juice 120 mL (4 oz or 15 g of CHO). Document juice on I and O flowsheet.              Or  dextrose 50 % injection 25-50 mL  Dose: 25-50 mL  Freq: EVERY 15 MIN PRN Route: IV  PRN Reason: low blood sugar  Start: 02/08/18 0532   Admin Instructions: Use if have IV access, BG less than 70 mg/dL and meet dose criteria below:  Dose if conscious and alert (or disorientated) and NPO = 25  mL  Dose if unconscious / not alert = 50 mL  Vesicant. For ordered doses up to 25 mg, give IV Push undiluted. Give each 5g over 1 minute.              Or  glucagon injection 1 mg  Dose: 1 mg  Freq: EVERY 15 MIN PRN Route: SC  PRN Reason: low blood sugar  PRN Comment: May repeat x 1 only  Start: 02/08/18 0532   Admin Instructions: May give SQ or IM. ONLY use glucagon IF patient has NO IV access AND is UNABLE to swallow AND blood glucose is LESS than or EQUAL to 50 mg/dL.  Give IV Push over 1 minute. Reconstitute with 1mL sterile water.               ipratropium (ATROVENT) 0.03 % spray 2 spray  Dose: 2 spray  Freq: EVERY 12 HOURS Route: BOTH NOSTRIL  Start: 02/08/18 0600       0947 (2 spray)-Given       2008 (2 spray)-Given        0633 (2 spray)-Given       1843 (2 spray)-Given        0637 (2 spray)-Given       1747 (2 spray)-Given        0633 (2 spray)-Given       [ ] 1800           lisinopril (PRINIVIL/Zestril) tablet 2.5 mg  Dose: 2.5 mg  Freq: DAILY Route: PO  Start: 02/08/18 0900       0949 (2.5 mg)-Given        0916 (2.5 mg)-Given        0959 (2.5 mg)-Given        0833 (2.5 mg)-Given           melatonin tablet 1 mg  Dose: 1 mg  Freq: AT BEDTIME PRN Route: PO  PRN Reason: sleep  Start: 02/08/18 0515   Admin Instructions: Do not give unless at least 6 hours of uninterrupted sleep is expected.               metFORMIN (GLUCOPHAGE) tablet 1,000 mg  Dose: 1,000 mg  Freq: 2 TIMES DAILY WITH MEALS Route: PO  Start: 02/08/18 0900       0949 (1,000 mg)-Given       1809 (1,000 mg)-Given        0916 (1,000 mg)-Given       1738 (1,000 mg)-Given        0959 (1,000 mg)-Given       1745 (1,000 mg)-Given        0833 (1,000 mg)-Given       [ ] 1800           metoprolol succinate (TOPROL-XL) half-tab 12.5 mg  Dose: 12.5 mg  Freq: DAILY Route: PO  Indications of Use: HYPERTENSION  Start: 02/08/18 0900   Admin Instructions: DO NOT CRUSH. Tablet may be split in half along score line.        0950 (12.5 mg)-Given        0916 (12.5  mg)-Given        0959 (12.5 mg)-Given        0832 (12.5 mg)-Given           naloxone (NARCAN) injection 0.1-0.4 mg  Dose: 0.1-0.4 mg  Freq: EVERY 2 MIN PRN Route: IV  PRN Reason: opioid reversal  Start: 02/08/18 0515   Admin Instructions: For respiratory rate LESS than or EQUAL to 8.  Partial reversal dose:  0.1 mg titrated q 2 minutes for Analgesia Side Effects Monitoring Sedation Level of 3 (frequently drowsy, arousable, drifts to sleep during conversation).Full reversal dose:  0.4 mg bolus for Analgesia Side Effects Monitoring Sedation Level of 4 (somnolent, minimal or no response to stimulation).  For ordered doses up to 2mg give IVP. Give each 0.4mg over 15 seconds in emergency situations. For non-emergent situations further dilute in 9mL of NS to facilitate titration of response.               omeprazole (priLOSEC) CR capsule 40 mg  Dose: 40 mg  Freq: DAILY Route: PO  Start: 02/08/18 0900       0949 (40 mg)-Given        0916 (40 mg)-Given        0959 (40 mg)-Given        0959 (40 mg)-Given           ondansetron (ZOFRAN-ODT) ODT tab 4 mg  Dose: 4 mg  Freq: EVERY 6 HOURS PRN Route: PO  PRN Reasons: nausea,vomiting  Start: 02/08/18 0519   Admin Instructions: This is Step 1 of nausea and vomiting management.  If nausea not resolved in 15 minutes, go to Step 2 prochlorperazine (COMPAZINE). Do not push through foil backing. Peel back foil and gently remove. Place on tongue immediately. Administration with liquid unnecessary  With dry hands, peel back foil backing and gently remove tablet; do not push oral disintegrating tablet through foil backing; administer immediately on tongue and oral disintegrating tablet dissolves in seconds; then swallow with saliva; liquid not required.              Or  ondansetron (ZOFRAN) injection 4 mg  Dose: 4 mg  Freq: EVERY 6 HOURS PRN Route: IV  PRN Reasons: nausea,vomiting  Start: 02/08/18 0519   Admin Instructions: This is Step 1 of nausea and vomiting management.  If nausea not  resolved in 15 minutes, go to Step 2 prochlorperazine (COMPAZINE).  Irritant. For ordered doses up to 4 mg, give IV Push undiluted over 2-5 minutes.               polyethylene glycol (MIRALAX/GLYCOLAX) Packet 17 g  Dose: 17 g  Freq: DAILY PRN Route: PO  PRN Reason: constipation  Start: 02/08/18 0514   Admin Instructions: 1 Packet = 17 grams. Mixed prescribed dose in 8 ounces of water. Follow with 8 oz. of water.          0155 (17 g)-Given [C]            tamsulosin (FLOMAX) capsule 0.4 mg  Dose: 0.4 mg  Freq: DAILY Route: PO  Start: 02/08/18 0900   Admin Instructions: Administer 30 minutes after the same meal each day.  Capsules should be swallowed whole; do not crush chew or open.        0949 (0.4 mg)-Given        0916 (0.4 mg)-Given        0959 (0.4 mg)-Given        0833 (0.4 mg)-Given          Discontinued Medications  Medications 02/05/18 02/06/18 02/07/18 02/08/18 02/09/18 02/10/18 02/11/18         Rate: 100 mL/hr   Freq: CONTINUOUS Route: IV  Start: 02/08/18 0530   End: 02/09/18 1128       0530 ( )-New Bag       1256 ( )-New Bag       2307 ( )-New Bag        0912 ( )-New Bag       1128-Med Discontinued           Rate: 125 mL/hr Dose: 1000 mL  Freq: CONTINUOUS Route: IV  Last Dose: Stopped (02/08/18 0358)  Start: 02/07/18 2347   End: 02/09/18 0917   Admin Instructions: Administer after the bolus.        0109 (1,000 mL)-New Bag       0358-ED Infusing on Admission/transfer        0917-Med Discontinued           Dose: 650 mg  Freq: EVERY 4 HOURS PRN Route: PO  PRN Reason: mild pain  Start: 02/08/18 0518   End: 02/09/18 0918   Admin Instructions: Alternate ibuprofen (if ordered) with acetaminophen.  Maximum acetaminophen dose from all sources = 75 mg/kg/day not to exceed 4 grams/day.         0918-Med Discontinued           Dose: 1 g  Freq: EVERY 6 HOURS Route: IV  Indications of Use: URINARY TRACT INFECTION  Indications Comment: Enterococcus faecalis UTI  Start: 02/10/18 1200   End: 02/10/18 1323         1323-Med  Discontinued  (1326)-Not Given              Dose: 1,000 mg  Freq: EVERY 6 HOURS SCHEDULED Route: PO  Indications of Use: URINARY TRACT INFECTION  Indications Comment: E faecalis UTI  Start: 02/10/18 1330   End: 02/11/18 0823   Admin Instructions: Take on an empty stomach.          1359 (1,000 mg)-Given       1744 (1,000 mg)-Given        0028 (1,000 mg)-Given       0633 (1,000 mg)-Given       0823-Med Discontinued         Dose: 1 g  Freq: EVERY 24 HOURS Route: IV  Indications of Use: URINARY TRACT INFECTION  Start: 02/09/18 0300   End: 02/10/18 1135   Admin Instructions: Give IVP over 3 minutes         0236 (1 g)-Given        0314 (1 g)-Given       1135-Med Discontinued          Dose: 40 mg  Freq: EVERY MORNING Route: PO  Start: 02/09/18 0900   End: 02/08/18 1448       1448-Med Discontinued       Medications 02/05/18 02/06/18 02/07/18 02/08/18 02/09/18 02/10/18 02/11/18

## 2018-02-07 NOTE — IP AVS SNAPSHOT
` `           [place] [address] [facility] SPECIALTY UNIT: [phone]                 INTERAGENCY TRANSFER FORM - NOTES (H&P, Discharge Summary, Consults, Procedures, Therapies)   2018                    Hospital Admission Date: 2018  SID PEARCE   : 1928  Sex: Male        Patient PCP Information     Provider PCP Type    Matt Morrissey MD General    Matt Morrissey MD Internal Medicine         History & Physicals      H&P signed by Blaze Martinez MD at 2/10/2018  5:12 AM      Author:  Blaze Martinez MD Service:  Hospitalist Author Type:  Physician    Filed:  2/10/2018  5:12 AM Date of Service:  2018  2:32 PM Creation Time:  2018  3:26 PM    Status:  Signed :  Blaze Martinez MD (Physician)         Admitted:     2018      PRIMARY CARE PHYSICIAN:  Dr. Matt Morrissey.        PRIMARY CARE DOCTOR:  Dr. Rafa Samuel.      CHIEF COMPLAINT:  Abdominal pain, generalized weakness.      HISTORY OF PRESENT ILLNESS:  Sid Pearce is a very young appearing 89-year-old  gentleman who lives with his significant other with known coronary disease, diabetes, aortic stenosis, peripheral arterial disease, stroke, atrial fibrillation for which he is on Plavix, who presents to [facility] for the last week with weakness and abdominal pain.  The patient for the last week is having lower abdominal pain, right greater than left.  He had episodes of nausea and several episodes of vomiting and diarrhea that was nonbloody.  The patient has also had multiple falls, now with his legs giving out and became so weak he initially went to Urgent Care to seek care.  He was subsequently transferred to [facility] for further assessment.      In the emergency department, the patient was seen by Dr. Chad Trierweiler.  The patient was afebrile.  Vital signs were stable.  Blood work revealed normal electrolytes, normal kidney function, normal LFTs, normal  lipase.  Troponin was below reference at 0.036, glucose was 179.  CBC was normal.  Urinalysis shows specific gravity of 1.020 with a pH of 6.5, small blood, large leukocyte esterase, greater than 182 white cells and 119 red cells, many bacteria and WBC clumps.  The patient also had a CT of the abdomen and pelvis due to his low back pain.  This showed no acute abnormality, no bowel obstruction or inflammation, colonic diverticula without evidence of acute diverticulitis.  There is bilateral intrarenal stones but no ureteral stones or hydronephrosis.  The patient subsequently was given IV fluids and ceftriaxone and is being admitted for UTI and multiple falls.      PAST MEDICAL HISTORY:   1.  Paroxysmal atrial fibrillation.     2.  Hypertension.   3.  Hyperlipidemia.   4.  History of ASCVD with history of acute myocardial infarction in the 1970s.   5.  GERD.     6.  DJD.   7.  Type 2 diabetes with peripheral neuropathy, most recent A1c was 8.9.     8.  History of cardiomyopathy with EF of 35-40%.     9.  History of BPH.     10.  History of aortic root dilatation.     11.  History of depression.     12.  History of chronically occluded carotid artery.   13.  History of diabetic infected ulcer and abscess, status post left partial fifth ray amputation in 06/2017.     14.  History of peripheral arterial disease, status post left MEG stenting and left SFA angioplasty.     15.  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   16.  History of mild cognitive impairment.     17.  History of hearing loss.      ALLERGIES:  OXYCODONE, SULFA, TETRACYCLINE.      PAST SURGICAL HISTORY:     1.  Left toe amputation.     2.  Cholecystectomy, laser lithotripsy.     3.  Hernia repair.   4.  History of I and D of the foot.     5.  Left rotator cuff repair.      FAMILY HISTORY:  Maternal history of breast cancer and paternal history of heart failure.      SOCIAL HISTORY:  , accompanied by his  significant other.  Former smoker, quit in 1999, retired .  He is FULL CODE.      CURRENT MEDICATIONS:   1.  Amitriptyline 25 mg once at bedtime.   2.  Aspirin 81 mg once a day.   3.  Atorvastatin 40 mg once a day.   4.  Vitamin D 50,000 units once a week.   5.  Plavix 75 mg once a day.   6.  Cymbalta 30 mg daily.   7.  Byetta 5 mcg twice a day.   8.  Glipizide 10 mg b.i.d.   9.  Atrovent 0.03% spray 2 sprays both nares twice a day.     10.  Lisinopril 2.5 mg once daily.   11.  Magnesium oxide 400 mg b.i.d.   12.  Metformin 1000 mg twice a day.   13.  Toprol-XL 12.5 mg daily.   14.  Omeprazole 40 mg once a day.   15.  MiraLax 17 grams daily as needed for constipation.     16.  Flomax 0.4 mg daily.      REVIEW OF SYSTEMS:  A 10-point systems reviewed and as dictated in history of present illness.      PHYSICAL EXAMINATION:   VITAL SIGNS:  Temperature 97.9, heart rate 68, respirations 18, blood pressure 127/65, saturations 98% on room air.   GENERAL:  The patient is a 89-year-old  gentleman who appears younger than his stated age.  He is pleasant, cooperative, in no distress.   HEENT:  Unremarkable.  Pupils equal.  Sclerae are anicteric.  Mucous membranes are moist.   NECK:  Veins are not distended.   LUNGS:  Clear to auscultation.   CARDIOVASCULAR:  S1, S2, regular rate and rhythm.  No murmurs, gallops, rubs.   ABDOMEN:  He does have some discomfort at the right lower quadrant.  There is no guarding or rebound.  Bowel sounds are normoactive.   EXTREMITIES:  No edema.   NEUROLOGIC:  Grossly nonfocal.  Cranial nerves grossly intact.      LABORATORY DATA:  As dictated in the history of present illness.      ASSESSMENT:  Dustin Pearce is a 89-year-old with abdominal pain and diarrhea for the last week, weakness, multiple falls, finding of acute urinary tract infection being admitted for further treatment.      PLAN:   1.  Urinary tract infection with multiple falls.  The patient had both blood  and urine cultures obtained.  Imaging studies did not show any complicated urinary tract infection such as ureteral stone or hydronephrosis.  The patient will continue on ceftriaxone.  We will monitor his blood and urine cultures.   2.  Diabetes.  The patient's blood sugars are slightly elevated.  His most recent A1c is 8.6.  We will place the patient on a moderate carbohydrate diet and do Accu-Cheks 4 times daily and cover with sliding scale insulin.  Will also continue the patient on his Byetta and Glucotrol and metformin.   3.  History of benign prostatic hypertrophy.  Will continue the patient on Flomax.   4.  ASCVD and peripheral arterial disease.  We will continue the patient on Plavix, Lipitor, lisinopril and metoprolol.  His blood pressure is under reasonably good control.   5.  Depression.  We will continue the patient on Cymbalta.   6.  Hyperlipidemia.  We will continue the patient on Lipitor.   7.  Reflux disease.  We will continue the patient on omeprazole.     8.  Deep venous thrombosis prophylaxis, patient with compression boots      CODE STATUS:  FULL.         BLAZE JIN MD             D: 2018   T: 2018   MT: GOPI      Name:     SID AGUIAR   MRN:      [id number]        Account:      [id number]   :      1928        Admitted:     2018                   Document: [id number]       cc: Matt Morrissey MD   [PD1.1]   Revision History        User Key Date/Time User Provider Type Action    > PD1.1 2/10/2018  5:12 AM Blaze Jin MD Physician Sign                  Discharge Summaries     No notes of this type exist for this encounter.         Consult Notes      Consults by Chandrika Singh RD, LD at 2018  3:10 PM     Author:  Chandrika Singh RD, LD Service:  Nutrition Author Type:  Registered Dietitian    Filed:  2018  3:10 PM Date of Service:  2018  3:10 PM Creation Time:  2018  3:00 PM    Status:  Signed :  Chandrika Singh RD, LD (Registered  "Dietitian)         BRIEF NUTRITION ASSESSMENT      REASON FOR ASSESSMENT:  Admission screen - Unintentional weight loss of 10# or more in past 2 months      NUTRITION HISTORY:  Pt states he follows a regular diet at home, does not count carbs.  He has had diet education in the past. He doesn't care for sweets or much bread.    He reports decreased intake, <50% of his usual, for the last week due to abdominal pain.  He'd also had some N/V and diarrhea but that has resolved.  Pt states that during this time he ate \"a lot\" of oatmeal and V-8 juice; he also ate soup.  He didn't eat regular meals, just grazed on these foods.    CURRENT DIET AND INTAKE:  Diet:  Mod consistent carb.  He states he's eating better now than previously.  He ate ~50% of lunch: meat loaf, mashed potatoes/gravy, corn, ashish food cake with strawberries and whipped topping.                ANTHROPOMETRICS:  Height: 5' 5\"  Weight: 69.2 kg  BMI: 25.38 kg/m2  IBW:  61.8 kg +/- 10%  Weight Status: Overweight BMI 25-29.9  %IBW: 112%  Weight History: Pt states he feels like he's lost a few pounds but he questions today's wt of 152#.  It appears his usual wt is ~168#.[CM1.1]  Wt Readings from Last 10 Encounters:   02/08/18 69.2 kg (152 lb 8 oz)   01/11/18 76.5 kg (168 lb 9.6 oz)   01/11/18 76.5 kg (168 lb 9.6 oz)   01/05/18 74.4 kg (164 lb)   01/04/18 74.4 kg (164 lb 0.4 oz)   12/14/17 74.6 kg (164 lb 6.4 oz)   12/01/17 75 kg (165 lb 6.4 oz)   11/03/17 76.7 kg (169 lb)   10/26/17 76.2 kg (168 lb)   10/26/17 76.2 kg (168 lb)[CM1.2]       LABS:  Alb 4.0    MALNUTRITION:  Patient does not meet two of the following criteria necessary for diagnosing malnutrition: significant weight loss, reduced intake, subcutaneous fat loss, muscle loss or fluid retention    NUTRITION INTERVENTION:  Nutrition Diagnosis:  No nutrition diagnosis at this time.    Implementation:  Nutrition Education:  Per Provider order if indicated  Will send Glucerna between meals until intake " returns to normal.    FOLLOW UP/MONITORING:   Will re-evaluate in 7 - 10 days, or sooner, if re-consulted.    Chandrika Singh RD  Pager [phone] (M-F)            [phone] (W/E & Hol)[CM1.1]               Revision History        User Key Date/Time User Provider Type Action    > CM1.2 2/8/2018  3:10 PM Chandrika Singh, EDDIE, LD Registered Dietitian Sign     CM1.1 2/8/2018  3:00 PM Chandrika Singh, EDDIE, MINNIE Registered Dietitian                      Progress Notes - Physician (Notes from 02/08/18 through 02/11/18)      Progress Notes by Jacque Wise LSW at 2/11/2018 12:37 PM     Author:  Jacque Wise LSW Service:  Social Work Author Type:      Filed:  2/11/2018 12:39 PM Date of Service:  2/11/2018 12:37 PM Creation Time:  2/11/2018 12:37 PM    Status:  Signed :  Jacque Wise LSW ()         D: HUBER following for DC planning.   I: Pt will DC today to [facility]. Pt and spouse in agreement. HE WC ride set for 1500. Spouse understands she will be billed for the cost of the ride. Orders faxed and facility updated. Nursing aware.   P: DC today.     ESAU Foreman, [facility]  [phone]    PAS-RR    D: Per DHS regulation, SW completed and submitted PAS-RR to [place] Board on Aging Direct Connect via the Senior LinkAge Line.  PAS-RR confirmation # is : [id number]    I: SW spoke with spouse and they are aware a PAS-RR has been submitted.  SW reviewed with spouse that they may be contacted for a follow up appointment within 10 days of hospital discharge if their SNF stay is < 30 days.  Contact information for [facility] LinkAge Line was also provided.    A: Spouse verbalized understanding.    P: Further questions may be directed to [facility] LinkAge Line at #[phone], option #4 for PAS-RR staff.[JJ1.1]       Revision History        User Key Date/Time User Provider Type Action    > JJ1.1 2/11/2018 12:39 PM Jacque Wise LSW  " Sign            Progress Notes by Jacque Wise LSW at 2/10/2018  2:50 PM     Author:  Jacque Wise LSW Service:  Social Work Author Type:      Filed:  2/10/2018  2:56 PM Date of Service:  2/10/2018  2:50 PM Creation Time:  2/10/2018  2:50 PM    Status:  Signed :  Jacque Wise LSW ()         Care Transition Initial Assessment - HUBER  Reason For Consult: discharge planning, facility placement  Met with: Patient and significant other, Halina.     Active Problems:    UTI (urinary tract infection)       DATA  Lives With: significant other  Living Arrangements: house  Description of Support System: Involved  Support Assessment: Adequate family and caregiver support.   Identified issues/concerns regarding health management: PT/OT recommend TCU. Pt has been to [facility] twice. Most recently in January. Halina in favor of TCU. Had home care a few years ago and states it \"didn't work out very well.\" Halina understood the homebound requirement and the frequency. Pt noncommittal. Both agreed to referrals to TCU. Did warn them [facility] may be full as SW spoke with them earlier in the day.      Quality Of Family Relationships: involved  Transportation Available: family or friend will provide (signficant other drives)    ASSESSMENT  Cognitive Status: Appears alert and oriented. Halina involved and assisting with decision making.   Concerns to be addressed: On-going.     PLAN  Financial costs for the patient includes: None.  Patient given options and choices for discharge: Yes.  Patient/family is agreeable to the plan?  YES  Patient Goals and Preferences: TCU vs home.  Patient anticipates discharging to: TCU vs home.    ESAU Foreman, [facility]  [id number][JJ1.1]               Revision History        User Key Date/Time User Provider Type Action    > JJ1.1 2/10/2018  2:56 PM Jacque Wise LSW Social " Worker Sign            Progress Notes by Matt Purcell MD at 2/10/2018  1:57 PM     Author:  Matt Purcell MD Service:  Hospitalist Author Type:  Physician    Filed:  2/10/2018  2:07 PM Date of Service:  2/10/2018  1:57 PM Creation Time:  2/10/2018  1:57 PM    Status:  Signed :  Matt Purcell MD (Physician)         [facility]  Hospitalist Progress Note  Matt Purcell MD  02/10/2018    Assessment & Plan   ASSESSMENT:  Dustin Pearce is a 89-year-old with abdominal pain and diarrhea for the last week, weakness, multiple falls, finding of acute urinary tract infection being admitted for further treatment.       1.  Urinary tract infection with multiple falls.    Imaging studies did not show any complicated urinary tract infection such as ureteral stone or hydronephrosis.   - blood NGTD, Urine growing E. faecalis  - change abx from rocephin to Ampicillin 1gm PO Q6  - taking in good PO, pain controlled  - feeling a little weak still, PT recommends TCU    2.  Diabetes type II.    Most recent A1c is 8.6.[MM1.1]      Recent Labs  Lab 02/10/18  1157 02/10/18  0743 02/10/18  0137 02/09/18  2110 02/09/18  1723 02/09/18  1112 02/09/18  0831  02/07/18  2341   GLC  --   --   --   --   --   --  145*  --  179*   * 128* 108* 146* 152* 127*  --   < >  --    < > = values in this interval not displayed.[MM1.2]  - Goal is Pre-prandial <140 and post-prandial <180  - continue on a moderate carbohydrate diet and do Accu-Cheks 4 times daily and cover with sliding scale insulin.    - he does well with PTA Byetta, Glucotrol and metformin which has been continued for him to this point, will keep on today.     3.  History of benign prostatic hypertrophy.    - continue the patient on Flomax.     4.  ASCVD and peripheral arterial disease.    - continue the patient on Plavix, Lipitor, lisinopril and metoprolol.   - blood pressure is under reasonably good control.     5.  Depression.    -  "continue the patient on Cymbalta.     6.  Hyperlipidemia.    - continue the patient on Lipitor.     7.  Reflux disease.    - continue the patient on omeprazole.       Deep venous thrombosis prophylaxis: PCDs  CODE: Full  Disposition: PT recommends TCU, patient doesn't want TCU but he doesn't want to go home today due to weakness.  His wife wants TCU, but doesn't want home PT/RN.  I will give another night for him and his wife to talk also for SW to explore various TCUs in the area      Interval History   Reports he feels good today but very weak, when discussing TCU with him he is very averse to the idea, wife present and tries to reinforce, he asks for more time here, when told this isnt something that will likely get better in 24 hours he gets upset.  He has no other complaints at this time.    -Data reviewed today: I reviewed all new labs and imaging over the last 24 hours. I personally reviewed no images or EKG's today.    Physical Exam   Heart Rate: 70, Blood pressure 134/59, pulse 60, temperature 97.9  F (36.6  C), temperature source Oral, resp. rate 18, height 1.651 m (5' 5\"), weight 69.2 kg (152 lb 8 oz), SpO2 97 %.  Vitals:    02/07/18 2145 02/08/18 0417   Weight: 72.6 kg (160 lb) 69.2 kg (152 lb 8 oz)     Vital Signs with Ranges  Temp:  [97.9  F (36.6  C)-98.2  F (36.8  C)] 97.9  F (36.6  C)  Pulse:  [60-82] 60  Heart Rate:  [70-75] 70  Resp:  [18] 18  BP: (110-134)/(59-69) 134/59  SpO2:  [96 %-97 %] 97 %  I/O's Last 24 hours  I/O last 3 completed shifts:  In: 1245 [P.O.:740; I.V.:505]  Out: -     Constitutional: NAD, afebrile, A+Ox4  Respiratory: CTA B, normal WOB  Cardiovascular: RRR, no murmur  GI: S, NT, ND, normal BS  Skin/Integumen: Dry, warm, no edema      Medications   All medications were reviewed.      ampicillin  1,000 mg Oral Q6H NEETA     aspirin  81 mg Oral Daily     atorvastatin (LIPITOR) tablet 80 mg  80 mg Oral Daily     clopidogrel (PLAVIX) tablet 75 mg  75 mg Oral Daily     DULoxetine  30 " mg Oral Daily     exenatide  5 mcg Subcutaneous BID AC     glipiZIDE  10 mg Oral BID AC     ipratropium  2 spray Both Nostrils Q12H     lisinopril (PRINIVIL/Zestril) tablet 2.5 mg  2.5 mg Oral Daily     metFORMIN (GLUCOPHAGE) tablet 1,000 mg  1,000 mg Oral BID w/meals     metoprolol succinate  12.5 mg Oral Daily     omeprazole  40 mg Oral Daily     tamsulosin  0.4 mg Oral Daily        Data     Recent Labs  Lab 02/09/18  0831 02/07/18  2341   WBC 6.4 8.9   HGB 12.0* 14.5   MCV 93 93   * 222    136   POTASSIUM 4.1 4.1   CHLORIDE 107 98   CO2 26 28   BUN 10 19   CR 0.73 0.91   ANIONGAP 6 10   ALLISON 8.2* 9.5   * 179*   ALBUMIN  --  4.0   PROTTOTAL  --  7.6   BILITOTAL  --  0.6   ALKPHOS  --  81   ALT  --  30   AST  --  18   LIPASE  --  135   TROPI  --  0.036       No results found for this or any previous visit (from the past 24 hour(s)).[MM1.1]            Revision History        User Key Date/Time User Provider Type Action    > MM1.2 2/10/2018  2:07 PM Matt Purcell MD Physician Sign     MM1.1 2/10/2018  1:57 PM Matt Purcell MD Physician             Progress Notes by Shiv Rose at 2/9/2018 11:31 AM     Author:  Shiv Rose Service:  Spiritual Health Author Type:      Filed:  2/9/2018  3:43 PM Date of Service:  2/9/2018 11:31 AM Creation Time:  2/9/2018 11:31 AM    Status:  Addendum :  Shiv Rose ()         SPIRITUAL HEALTH SERVICES Progress Note  [facility]    SH, referral attempted visit,  attempted two times to visit -not available and sleeping.    SH will follow and try back later today.[DC1.1]    SH returned. The patient talked about growing up in [place] and included Alevism life as a kids. The patient related the changes over time and his sonia as a[DC1.2] Druze with[DC1.3] Jehovah's witness[DC1.2] friends[DC1.3].    SH provided care in listening and offered a general blessing.[DC1.2]           Shiv Rose  Chaplain Resident[DC1.1]     Revision  History        User Key Date/Time User Provider Type Action    > DC1.3 2/9/2018  3:43 PM Shiv Rose Addend     DC1.2 2/9/2018  3:42 PM Shiv Rose Addend     DC1.1 2/9/2018 11:32 AM Shiv Rose Sign            Progress Notes by Blaze Martinez MD at 2/9/2018 11:31 AM     Author:  Blaze Martinez MD Service:  Hospitalist Author Type:  Physician    Filed:  2/9/2018 11:34 AM Date of Service:  2/9/2018 11:31 AM Creation Time:  2/9/2018 11:31 AM    Status:  Signed :  Blaze Martinez MD (Physician)         [facility]  Hospitalist Progress Note  Blaze Martinez MD  02/09/2018    Assessment & Plan   ASSESSMENT:  Dustin Pearec is a 89-year-old with abdominal pain and diarrhea for the last week, weakness, multiple falls, finding of acute urinary tract infection being admitted for further treatment.       PLAN:   1.  Urinary tract infection with multiple falls.    - blood and urine cultures pending and negative to date.  - Imaging studies did not show any complicated urinary tract infection such as ureteral stone or hydronephrosis.   - continue on ceftriaxone and await ucx  - could discharge on ceftin at discharge for 7 days.  - eating and drinking well, will SL IVF  2.  Diabetes type II.    - most recent A1c is 8.6.    - continue on a moderate carbohydrate diet and do Accu-Cheks 4 times daily and cover with sliding scale insulin.    - continue the patient on his Byetta and Glucotrol and metformin.   3.  History of benign prostatic hypertrophy.    - continue the patient on Flomax.   4.  ASCVD and peripheral arterial disease.    - continue the patient on Plavix, Lipitor, lisinopril and metoprolol.   - blood pressure is under reasonably good control.   5.  Depression.    - continue the patient on Cymbalta.   6.  Hyperlipidemia.    - continue the patient on Lipitor.   7.  Reflux disease.    - continue the patient on omeprazole.     8.  Deep venous thrombosis  "prophylaxis, patient with compression boots   9. Disposition  - PT/OT to evaluate  - home vs home therapy vs TCU      Interval History   - chart reviewed  - he feels  100% better  - abd pain resolved    -Data reviewed today: I reviewed all new labs and imaging over the last 24 hours. I personally reviewed no images or EKG's today.    Physical Exam   Heart Rate: 80, Blood pressure 138/65, pulse 74, temperature 97.5  F (36.4  C), temperature source Oral, resp. rate 18, height 1.651 m (5' 5\"), weight 69.2 kg (152 lb 8 oz), SpO2 95 %.  Vitals:    02/07/18 2145 02/08/18 0417   Weight: 72.6 kg (160 lb) 69.2 kg (152 lb 8 oz)     Vital Signs with Ranges  Temp:  [97.5  F (36.4  C)-98.2  F (36.8  C)] 97.5  F (36.4  C)  Pulse:  [74] 74  Heart Rate:  [71-80] 80  Resp:  [18] 18  BP: (102-138)/(48-65) 138/65  SpO2:  [95 %-99 %] 95 %  I/O's Last 24 hours  I/O last 3 completed shifts:  In: 2244 [P.O.:770; I.V.:1474]  Out: -     Constitutional: Awake, alert, cooperative, no apparent distress  Respiratory: Clear to auscultation bilaterally, no crackles or wheezing  Cardiovascular: Regular rate and rhythm, normal S1 and S2, and no murmur noted  GI: Normal bowel sounds, soft, non-distended, non-tender  Skin/Integumen: No rashes, no cyanosis, no edema  Other:      Medications   All medications were reviewed.      aspirin  81 mg Oral Daily     atorvastatin (LIPITOR) tablet 80 mg  80 mg Oral Daily     clopidogrel (PLAVIX) tablet 75 mg  75 mg Oral Daily     DULoxetine  30 mg Oral Daily     exenatide  5 mcg Subcutaneous BID AC     glipiZIDE  10 mg Oral BID AC     ipratropium  2 spray Both Nostrils Q12H     lisinopril (PRINIVIL/Zestril) tablet 2.5 mg  2.5 mg Oral Daily     metFORMIN (GLUCOPHAGE) tablet 1,000 mg  1,000 mg Oral BID w/meals     metoprolol succinate  12.5 mg Oral Daily     omeprazole  40 mg Oral Daily     tamsulosin  0.4 mg Oral Daily     cefTRIAXone  1 g Intravenous Q24H        Data     Recent Labs  Lab 02/09/18  0831 " 02/07/18  2341   WBC 6.4 8.9   HGB 12.0* 14.5   MCV 93 93   * 222    136   POTASSIUM 4.1 4.1   CHLORIDE 107 98   CO2 26 28   BUN 10 19   CR 0.73 0.91   ANIONGAP 6 10   ALLISON 8.2* 9.5   * 179*   ALBUMIN  --  4.0   PROTTOTAL  --  7.6   BILITOTAL  --  0.6   ALKPHOS  --  81   ALT  --  30   AST  --  18   LIPASE  --  135   TROPI  --  0.036       No results found for this or any previous visit (from the past 24 hour(s)).    Blaze Martinez MD  Text Page  (7am to 6pm)[PD1.1]          Revision History        User Key Date/Time User Provider Type Action    > PD1.1 2/9/2018 11:34 AM Blaze Martinez MD Physician Sign            Progress Notes by Lara Arana PT at 2/8/2018  3:25 PM     Author:  Lara Arana PT Service:  (none) Author Type:  Physical Therapist    Filed:  2/8/2018  3:48 PM Date of Service:  2/8/2018  3:25 PM Creation Time:  2/8/2018  3:48 PM    Status:  Signed :  Lara Arana PT (Physical Therapist)          02/08/18 0915   Quick Adds   Type of Visit Initial PT Evaluation   Living Environment   Lives With significant other   Living Arrangements other (see comments)  ([facility])   Home Accessibility bed and bath are not on the first floor;grab bars present (bathtub);grab bars present (toilet);stairs to enter home;stairs within home;stairs (2 railings present);tub/shower is not walk in   Number of Stairs to Enter Home 3   Number of Stairs Within Home 7   Stair Railings at Home outside, present at both sides;inside, present on right side   Transportation Available family or friend will provide  (signficant other drives)   Living Environment Comment Pt depends on significant other for driving   Self-Care   Dominant Hand right   Usual Activity Tolerance good   Current Activity Tolerance moderate   Regular Exercise yes   Activity/Exercise Type strength training   Exercise Amount/Frequency other (see comments)  (during stay at nursing home)   Equipment Currently Used at Home grab  "bar   Activity/Exercise/Self-Care Comment Primary source of exercise is when attending therapies, otherwise enjoys walking. Pt owns FWW, however is only using SEC currently for all mobility   Functional Level Prior   Ambulation 1-->assistive equipment   Transferring 1-->assistive equipment   Toileting 0-->independent   Bathing 0-->independent   Dressing 0-->independent   Eating 0-->independent   Communication 0-->understands/communicates without difficulty   Swallowing 0-->swallows foods/liquids without difficulty   Cognition 0 - no cognition issues reported   Fall history within last six months yes   Number of times patient has fallen within last six months 6  (\"about 6 times\" - 3 with cane, 3 w/o)   Which of the above functional risks had a recent onset or change? ambulation;transferring   Prior Functional Level Comment Extensive fall history with and without AD. Enjoyed last TCU stay near home in [place] that pt is open to attending again to improve strength and balance   General Information   Onset of Illness/Injury or Date of Surgery - Date 02/07/18   Referring Physician Blaze Martinez MD   Patient/Family Goals Statement To go home, however open to TCU   Pertinent History of Current Problem (include personal factors and/or comorbidities that impact the POC) Pt is a 89 yo male with PMHx significant for MI, CAD, DM, aortic stenosis, PAD, stroke, and a-fib who presents to the ED for abdominal pain and generalized weakness.   Precautions/Limitations fall precautions   General Observations Pt seated in chair eating breakfast with R IV and VSS on RA upon PT arrival.   General Info Comments Activity: Up with assist   Cognitive Status Examination   Orientation orientation to person, place and time   Level of Consciousness alert   Follows Commands and Answers Questions 75% of the time;able to follow multistep instructions   Personal Safety and Judgment at risk behaviors demonstrated   Pain Assessment   Patient " "Currently in Pain No   Integumentary/Edema   Integumentary/Edema no deficits were identifed   Posture    Posture Protracted shoulders   Range of Motion (ROM)   ROM Comment BLE WNL for all AROM   Strength   Strength Comments 4/5 globally with exception of 4-/5 for B hip flexion   Bed Mobility   Bed Mobility Comments SBA for sit>supine and for bridging and boosting with vc   Transfer Skills   Transfer Comments CGA for sit>stand with cueing for hand placement, poor carryover   Gait   Gait Comments CGA for 10ft with FWW, no overt LOB howver observed NBOS   Balance   Balance Comments Moderate static standing balance w/o UE support, good static and dynamic standing balance with UE support   Sensory Examination   Sensory Perception no deficits were identified   Coordination   Coordination no deficits were identified   Muscle Tone   Muscle Tone no deficits were identified   General Therapy Interventions   Planned Therapy Interventions balance training;gait training;manual therapy;neuromuscular re-education;strengthening;stretching;transfer training;home program guidelines;progressive activity/exercise   Clinical Impression   Criteria for Skilled Therapeutic Intervention yes, treatment indicated   PT Diagnosis impaired gait   Influenced by the following impairments impaired balance   Functional limitations due to impairments transfers, gait, stairs   Clinical Presentation Stable/Uncomplicated   Clinical Presentation Rationale 2-3 factors   Clinical Decision Making (Complexity) Low complexity   Therapy Frequency` daily   Predicted Duration of Therapy Intervention (days/wks) 3 days   Anticipated Discharge Disposition Transitional Care Facility   Risk & Benefits of therapy have been explained Yes   Patient, Family & other staff in agreement with plan of care Yes   [facility] AM-PAC TM \"6 Clicks\"   2016, Trustees of [facility], under license to [employer].  All rights reserved.   6 Clicks Short Forms Basic " "Mobility Inpatient Short Form   [facility] AM-PAC  \"6 Clicks\" V.2 Basic Mobility Inpatient Short Form   1. Turning from your back to your side while in a flat bed without using bedrails? 3 - A Little   2. Moving from lying on your back to sitting on the side of a flat bed without using bedrails? 3 - A Little   3. Moving to and from a bed to a chair (including a wheelchair)? 3 - A Little   4. Standing up from a chair using your arms (e.g., wheelchair, or bedside chair)? 3 - A Little   5. To walk in hospital room? 3 - A Little   6. Climbing 3-5 steps with a railing? 2 - A Lot   Basic Mobility Raw Score (Score out of 24.Lower scores equate to lower levels of function) 17   Total Evaluation Time   Total Evaluation Time (Minutes) 10[KJ1.1]        Revision History        User Key Date/Time User Provider Type Action    > KJ1.1 2/8/2018  3:48 PM Lara Arana, PT Physical Therapist Sign            Progress Notes by Jacque Kim OT at 2/8/2018 11:59 AM     Author:  Jacque Kim OT Service:  Acute IP Rehab Author Type:  Occupational Therapist    Filed:  2/8/2018 12:00 PM Date of Service:  2/8/2018 11:59 AM Creation Time:  2/8/2018 11:59 AM    Status:  Signed :  Jacque Kim OT (Occupational Therapist)          02/08/18 0810   Quick Adds   Type of Visit Initial Occupational Therapy Evaluation   Living Environment   Lives With significant other   Living Arrangements house  (townhouse)   Home Accessibility stairs to enter home;tub/shower is not walk in   Number of Stairs to Enter Home 2   Number of Stairs Within Home 7   Transportation Available family or friend will provide   Self-Care   Dominant Hand right   Usual Activity Tolerance good   Current Activity Tolerance moderate   Regular Exercise yes   Equipment Currently Used at Home cane, straight;grab bar   Functional Level Prior   Ambulation 1-->assistive equipment   Transferring 1-->assistive equipment   Toileting " 0-->independent   Bathing 1-->assistive equipment   Dressing 0-->independent   Eating 0-->independent   Communication 0-->understands/communicates without difficulty   Swallowing 0-->swallows foods/liquids without difficulty   Cognition 0 - no cognition issues reported   Fall history within last six months yes   Number of times patient has fallen within last six months 6   Prior Functional Level Comment Pt does not drive SO drives and manges own meds and does the cookign, cleaning and laundry etc.    General Information   Onset of Illness/Injury or Date of Surgery - Date 02/07/18   Referring Physician Blaze Martinez MD   Patient/Family Goals Statement Return home   Additional Occupational Profile Info/Pertinent History of Current Problem Dustin Pearce is a 89 year old male with a history of MI, CAD, DM, aortic stenosis, PAD, stroke, and atrial fibrillation currently anticoagulated on Plavix for who presents to the emergency department today for evaluation of abdominal pain and generalized weakness. diagnosed with UTI   Precautions/Limitations fall precautions   Cognitive Status Examination   Orientation orientation to person, place and time   Level of Consciousness alert   Able to Follow Commands WNL/WFL   Personal Safety (Cognitive) moderate impairment   Cognitive Comment Will continue to monitor and screen cognition   Visual Perception   Visual Perception Wears glasses  (has macular degeneration)   Visual Perception Comments has difficulty seeing details and unable to see/read small print, can see clock on the wall correct time.    Sensory Examination   Sensory Quick Adds No deficits were identified   Pain Assessment   Patient Currently in Pain No   Range of Motion (ROM)   ROM Comment B UE AROM WFL   Strength   Strength Comments B UE MMT 4-/5   Bed Mobility Skill: Rolling/Turning   Level of [place] - Bed Mobility Skill Rolling Turning contact guard   Bed Mobility Skill: Scooting/Bridging   Level of  [place]: Scooting/Bridging contact guard   Bed Mobility Skill: Supine to Sit   Level of [place]: Supine/Sit minimum assist (75% patients effort)   Assistive Device: Supine/Sit bedrail   Transfer Skill: Bed to Chair/Chair to Bed   Level of [place]: Bed to Chair contact guard   Assistive Device - Transfer Skill Bed to Chair Chair to Bed Rehab Eval standard walker   Transfer Skill: Sit to Stand   Level of [place]: Sit/Stand contact guard   Assistive Device for Transfer: Sit/Stand standard walker   Transfer Skill: Toilet Transfer   Level of [place]: Toilet (SBA standing for toileting)   Lower Body Dressing   Level of [place]: Dress Lower Body contact guard   Toileting   Level of [place]: Toilet contact guard   Grooming   Level of [place]: Grooming contact guard   Eating/Self Feeding   Level of [place]: Eating independent   Instrumental Activities of Daily Living (IADL)   Previous Responsibilities (all iADL's provided by SO)   Activities of Daily Living Analysis   Impairments Contributing to Impaired Activities of Daily Living balance impaired;cognition impaired;strength decreased  (decreased activity tolerance)   General Therapy Interventions   Planned Therapy Interventions ADL retraining;transfer training;cognition;home program guidelines;progressive activity/exercise   Clinical Impression   Criteria for Skilled Therapeutic Interventions Met yes, treatment indicated   OT Diagnosis decrease ADL and functional mobility   Influenced by the following impairments impaired ADL and functional mobility   Assessment of Occupational Performance 3-5 Performance Deficits   Identified Performance Deficits impaired I with dressing, toilet and shower transfer.   Clinical Decision Making (Complexity) Moderate complexity   Therapy Frequency daily   Predicted Duration of Therapy Intervention (days/wks) 3 days   Anticipated Discharge Disposition Transitional Care Facility;Home with Assist;Home  "with Home Therapy   Risks and Benefits of Treatment have been explained. Yes   Patient, Family & other staff in agreement with plan of care Yes   [facility] AM-PAC  \"6 Clicks\" Daily Activity Inpatient Short Form   1. Putting on and taking off regular lower body clothing? 3 - A Little   2. Bathing (including washing, rinsing, drying)? 3 - A Little   3. Toileting, which includes using toilet, bedpan or urinal? 3 - A Little   4. Putting on and taking off regular upper body clothing? 4 - None   5. Taking care of personal grooming such as brushing teeth? 3 - A Little   6. Eating meals? 4 - None   Daily Activity Raw Score (Score out of 24.Lower scores equate to lower levels of function) 20   Total Evaluation Time   Total Evaluation Time (Minutes) 10[JG1.1]        Revision History        User Key Date/Time User Provider Type Action    > JG1.1 2/8/2018 12:00 PM Jacque Kim, OT Occupational Therapist Sign            ED Provider Notes by Trierweiler, Chad A, MD at 2/7/2018  9:32 PM     Author:  Trierweiler, Chad A, MD Service:  Emergency Medicine Author Type:  Physician    Filed:  2/8/2018  5:38 AM Date of Service:  2/7/2018  9:32 PM Creation Time:  2/7/2018 10:13 PM    Status:  Signed :  Trierweiler, Chad A, MD (Physician)           History     Chief Complaint:[JF1.1]  Abdominal Pain[AB1.1] and Generalized Weakness[AB1.2]    HPI   Dustin Pearce is a 89 year old male[JF1.1] with a history of[AB1.3] MI, CAD, DM, aortic stenosis, PAD, stroke, and atrial fibrillation currently anticoagulated[AB1.2] on Plavix for who presents to the emergency department today for evaluation of[JF1.1] abdominal pain[AB1.1] and generalized weakness. The patient reports that he began having lower abdominal pain, right greater than left, approximately one week ago, with associated nausea and several episodes of vomiting and nonbloody diarrhea, which have since subsided. However, his pain has continued persistently since " "onset and is perhaps slightly worsened with having a bowel movement. No other significant exacerbating or alleviating symptoms. The patient has also been somewhat generally fatigued and weak with his ongoing symptoms and has had multiple near falls secondary to this weakness, explaining that his legs \"gave out.\" His ongoing symptoms prompted him to seek evaluation in urgent care this afternoon, at which time he was referred here to the ED.     The patient reports that he has not been taking PO well with these recent symptoms. He denies any recent fevers. He denies sustaining any other significant injuries as a result of his near falls and has not struck his head.[AB1.2]    Allergies:[JF1.1]  Oxycodone  Sulfa Drugs  Tetracycline[JF1.2]    Medications:    Exenatide  Magnesium  Acetaminophen  Miralax/Glycolax  Cholecalciferol  Lisinopril  Metoprolol  Glipizide  Amitriptyline  Atrovent  Cymbalta  Flomax  Aspirin 81   Metformin  Atorvastatin  Clopidogrel    Past Medical History:    Aortic root dilatation    Aortic stenosis   Benign non-nodular prostatic hyperplasia with lower urinary tract symptoms   CAD (coronary artery disease)   Cardiomyopathy   Carotid artery stenosis   Carotid occlusion, left   Diabetes mellitus    DJD (degenerative joint disease)   Gastro-oesophageal reflux disease   Gastroesophageal reflux disease without esophagitis[JF1.1]   MI[AB1.3]  Hyperlipidemia   Hypertension   Mild cognitive impairment   Nephrolithiasis   Osteopenia   PAD (peripheral artery disease)    Paroxysmal atrial fibrillation   RBBB with left anterior fascicular block   Unspecified cerebral artery occlusion with cerebral infarction     Past Surgical History:    Amputate left foot  Cholecystectomy  Endarterectomy right carotid  Esophagoscopy, gastroscopy, duodenoscopy (egd), combined   Kidney stone removal x 4  UGI Endoscopy with EUS  Hernia repair  Incision and drainage left foot combined  Laser holmium lithotripsy ureters, insert " "stent, combined  Rotator cuff repair     Family History:    Mother: Breast Cancer  Father: Heart Failure    Social History:  The patient was accompanied to the ED by[JF1.1] significant other[AB1.2].  Smoking status: Former Smoker   Packs/day: 1.00   Years: 30.00   Types: Cigarettes   Quit date: 1/1/1999  Smokeless tobacco: Never Used  Alcohol use 4.2 oz/week - 7 Standard drinks or equivalent per week   Comment: 1 drink per week  Marital Status:       Review of Systems   Constitutional: Positive for[JF1.1] appetite change[AB1.2] and[JF1.1] fatigue[AB1.2]. Negative for[JF1.1] fever[AB1.2].   Gastrointestinal: Positive for[JF1.1] abdominal pain[AB1.2],[JF1.1] diarrhea (Resolved)[AB1.2],[JF1.1] nausea (Resolved)[AB1.2] and[JF1.1] vomiting (Resolved)[AB1.2].   Musculoskeletal: Positive for[JF1.1] gait problem[AB1.2].[JF1.1]   All other systems reviewed and are negative[AB1.2].    Physical Exam[JF1.1]     Patient Vitals for the past 24 hrs:   BP Temp Temp src Pulse Resp SpO2 Height Weight   02/08/18 0230 160/79 - - - - 97 % - -   02/08/18 0200 148/71 - - - - 98 % - -   02/08/18 0130 138/62 - - - - 97 % - -   02/08/18 0115 - - - - - 100 % - -   02/08/18 0100 127/67 - - - - 99 % - -   02/08/18 0045 - - - - - 96 % - -   02/08/18 0030 137/58 - - - - - - -   02/08/18 0015 - - - - - 98 % - -   02/07/18 2145 158/73 98.2  F (36.8  C) Oral 89 20 98 % 1.651 m (5' 5\") 72.6 kg (160 lb)[AB1.4]     Physical Exam[JF1.1]  Eye:  Pupils are equal, round, and reactive.  Extraocular movements intact.    ENT:  No rhinorrhea.  Moist mucus membranes.  Normal tongue and tonsil.    Cardiac:  Regular rate and rhythm.  No murmurs, gallops, or rubs.    Pulmonary:  Clear to auscultation bilaterally.  No wheezes, rales, or rhonchi.    Abdomen:  Focal tenderness in right pelvis and RLQ without rebound or guarding.     Musculoskeletal:  Normal movement of all extremities without evidence for deficit.    Skin:  Warm and dry without " rashes.    Neurologic:  Non-focal exam without asymmetric weakness or numbness.     Psychiatric:  Normal affect with appropriate interaction with examiner.[AB1.5]    Emergency Department Course   Imaging:  Radiology findings were communicated with the[JF1.1] patient[AB1.6] who voiced understanding of the findings.[JF1.1]    CT Abdomen/Pelvis with contrast:[AB1.7]   1. No acute abnormality. No bowel obstruction or inflammation.  2. Colonic diverticula without acute diverticulitis.  3. Bilateral intrarenal stones. No ureteral stone or hydronephrosis.[AB1.8] As per radiology.[AB1.7]    Laboratory:[JF1.1]  CBC: WBC: 8.9, HGB: 13.8, PLT: 222  CMP: Glucose 179 (H), o/w WNL (Creatinine: 0.91)    2341 Troponin: 0.036    Lipase: 135    UA with micro:[AB1.6] small blood, protein albumin 100, large Leukocyte Esterase, WBC >182 (H),  (H), WBC clumps present, many bacteria[AB1.8] o/w negative[AB1.6]   Urine Culture: pending[AB1.4]     Interventions:[JF1.1]  2347 NS 1L IV[AB1.4]  0109 NS 1L IV[AB1.8]  0302[AB1.4] Rocephin 1 g IV[AB1.9]    Emergency Department Course:[JF1.1]  Nursing notes and vitals reviewed. 2349 I performed an exam of the patient as documented above.     IV inserted. Medicine administered as documented above. Blood drawn. This was sent to the lab for further testing, results above.[AB1.6]    0106[AB1.8] I rechecked the patient and discussed the results of his workup thus far.[AB1.6]     The patient was sent for a CT Abdomen Pelvis while in the emergency department, findings above.[AB1.7]     The patient provided a urine sample here in the emergency department. This was sent for laboratory testing, findings above.[AB1.6]     0234[AB1.8] I reevaluated the patient and provided an update in regards to his ED course.[AB1.7]      0305[AB1.4]  I consulted with [AB1.10] Juan[AB1.4] of the hospitalist services. They are in agreement to accept the patient for admission.    Findings and plan explained to the  Patient who consents to admission. Discussed the patient with [AB1.10] Juan[AB1.4], who will admit the patient to a[AB1.10] medical[AB1.4] bed for further monitoring, evaluation, and treatment.[AB1.10]    Impression & Plan    Medical Decision Making:[JF1.1]  This previously healthy 89-year-old presents with complaints of having a week of right-sided abdominal pain after having a day of vomiting and diarrhea.  Because of ongoing nausea, he has not been able to tolerate his normal p.o. intake, resulting in increasing weakness.  Urgent care sent him here for further evaluation of an elderly gentleman with abdominal pain.  His laboratory investigation is completely unremarkable.  CT the abdomen is also normal.  Urinalysis was finally obtained which shows significant infection.  However, considering that there is no other signs of systemic symptoms, he does not appear to be septic.  However, due to his increasing weakness, he will certainly require admission to the hospital.  He was given Rocephin and a urine culture is pending.  I spoke with Dr. Blaze Martinez of the hospitalist service will admit the patient[CT1.1]    Diagnosis:[JF1.1]    ICD-10-CM   1. Generalized muscle weakness M62.81   2. Abdominal pain, right lower quadrant R10.31   3. Urinary tract infection with hematuria, site unspecified N39.0    R31.9[AB1.4]        Disposition:[JF1.1]   Admitted to[AB1.9] [AB1.10] Juan[AB1.4] of the hospitalist services.[AB1.10]    Scribe Disclosure:[JF1.1]  I, Alessandra Mcwilliams, am serving as a scribe on 2/7/2018 at 11:49 PM to personally document services performed by Trierweiler, Chad A, MD based on my observations and the provider's statements to me.[AB1.2]        EMERGENCY DEPARTMENT[JF1.1]       Trierweiler, Chad A, MD  02/08/18 0538  [CT1.2]     Revision History        User Key Date/Time User Provider Type Action    > CT1.2 2/8/2018  5:38 AM Trierweiler, Chad A, MD Physician Sign     CT1.1 2/8/2018  5:36 AM  Trierweiler, Chad A, MD Physician      AB1.5 2/8/2018  5:09 AM Mcwilliams, Alessandra Scribe Share     AB1.4 2/8/2018  3:29 AM Mcwilliams, Alessandra Scribe Share     [N/A] 2/8/2018  2:40 AM Mcwilliams, Alessandra Scribe Share     AB1.9 2/8/2018  2:39 AM Mcwilliams, Alessandra Scribe Share     AB1.10 2/8/2018  2:38 AM Mcwilliams, Alessandra Scribe      AB1.8 2/8/2018  2:37 AM Mcwilliams, Alessandra Scribe Share     AB1.7 2/8/2018  1:14 AM Mcwilliams, Alessandra Scribe Share     AB1.6 2/8/2018 12:41 AM Mcwilliams, Alessandra Scribe Share     AB1.2 2/7/2018 11:59 PM Mcwilliams, Alessandra Scribe Share     AB1.1 2/7/2018 11:47 PM Mcwilliams, Alessandra Scribe Share     AB1.3 2/7/2018 11:43 PM Mcwilliams, Alessandra Scribe Share     JF1.2 2/7/2018 10:17 PM Ambrosio Wilmar Scribe Share     JF1.1 2/7/2018 10:13 PM Wilmar Ambrosio Scribe             Progress Notes by Denise Schuster RN at 2/8/2018  3:40 AM     Author:  Denise Schuster RN Service:  Skilled Nursing Author Type:  Registered Nurse    Filed:  2/8/2018  3:40 AM Date of Service:  2/8/2018  3:40 AM Creation Time:  2/8/2018  3:40 AM    Status:  Signed :  Denise Schuster RN (Registered Nurse)         RECEIVING UNIT ED HANDOFF REVIEW    ED Nurse Handoff Report was reviewed by: Denise Schuster on February 8, 2018 at 3:40 AM[ML1.1]          Revision History        User Key Date/Time User Provider Type Action    > ML1.1 2/8/2018  3:40 AM Denise Schuster RN Registered Nurse Sign            ED Notes by Haydee Donaldson RN at 2/8/2018  2:48 AM     Author:  Haydee Donaldson RN Service:  (none) Author Type:  Registered Nurse    Filed:  2/8/2018  2:54 AM Date of Service:  2/8/2018  2:48 AM Creation Time:  2/8/2018  2:48 AM    Status:  Signed :  Haydee Donaldson RN (Registered Nurse)         [facility]  ED Nurse Handoff Report    ED Chief complaint: Flu Symptoms (started last wednesday.He is currently having abdominal pain with nausea)      ED Diagnosis:   Final diagnoses:   Generalized muscle weakness   Abdominal pain, right lower quadrant   Urinary tract infection  "with hematuria, site unspecified       Code Status: Full Code    Allergies:   Allergies   Allergen Reactions     Oxycodone Other (See Comments)     \"TERRIBLE SWEATING\"     Sulfa Drugs      Tetracycline        Activity level - Baseline/Home:  Independent    Activity Level - Current:   Independent     Needed?: No    Isolation: No  Infection: Not Applicable    Bariatric?: No    Vital Signs:   Vitals:    02/08/18 0100 02/08/18 0115 02/08/18 0130 02/08/18 0200   BP: 127/67  138/62 148/71   Pulse:       Resp:       Temp:       TempSrc:       SpO2: 99% 100% 97% 98%   Weight:       Height:           Cardiac Rhythm: ,        Pain level: 0-10 Pain Scale: 5    Is this patient confused?: No    Patient Report: Initial Complaint: Abdominal Pain  Focused Assessment: Pt c/o RLQ abdominal Pain radiating to LLQ that has been going on for the last couple of days. Pt reports multiple emesis episodes at home but denies any diarrhea. Pt reports intermittent nausea in the ED.  Tests Performed: Labs; UA; CT Abdomen/Pelvis.  Abnormal Results: UA results suggest UTI.  Treatments provided: 1000 ML NS Bolus; 1 G Rocephin;    Family Comments: Significant other at bedside.    OBS brochure/video discussed/provided to patient: N/A    ED Medications:   Medications   0.9% sodium chloride BOLUS (0 mLs Intravenous Stopped 2/8/18 0109)     Followed by   0.9% sodium chloride infusion (1,000 mLs Intravenous New Bag 2/8/18 0109)   cefTRIAXone (ROCEPHIN) 1 g in 10 mL SWFI Premix Syringe (not administered)   iopamidol (ISOVUE-370) solution 81 mL (81 mLs Intravenous Given 2/8/18 0148)   Saline flush (65 mLs Intravenous Given 2/8/18 0148)       Drips infusing?:  No      ED NURSE PHONE NUMBER: [phone][DN1.1]            Revision History        User Key Date/Time User Provider Type Action    > DN1.1 2/8/2018  2:54 AM Haydee Donaldson, RN Registered Nurse Sign                  Procedure Notes     No notes of this type exist for this encounter.    "      Progress Notes - Therapies (Notes from 02/08/18 through 02/11/18)      Progress Notes by Lara Arana PT at 2/8/2018  3:25 PM     Author:  Lara Arana PT Service:  (none) Author Type:  Physical Therapist    Filed:  2/8/2018  3:48 PM Date of Service:  2/8/2018  3:25 PM Creation Time:  2/8/2018  3:48 PM    Status:  Signed :  Lara Arana PT (Physical Therapist)          02/08/18 0915   Quick Adds   Type of Visit Initial PT Evaluation   Living Environment   Lives With significant other   Living Arrangements other (see comments)  ([facility])   Home Accessibility bed and bath are not on the first floor;grab bars present (bathtub);grab bars present (toilet);stairs to enter home;stairs within home;stairs (2 railings present);tub/shower is not walk in   Number of Stairs to Enter Home 3   Number of Stairs Within Home 7   Stair Railings at Home outside, present at both sides;inside, present on right side   Transportation Available family or friend will provide  (signficant other drives)   Living Environment Comment Pt depends on significant other for driving   Self-Care   Dominant Hand right   Usual Activity Tolerance good   Current Activity Tolerance moderate   Regular Exercise yes   Activity/Exercise Type strength training   Exercise Amount/Frequency other (see comments)  (during stay at nursing home)   Equipment Currently Used at Home grab bar   Activity/Exercise/Self-Care Comment Primary source of exercise is when attending therapies, otherwise enjoys walking. Pt owns FWW, however is only using SEC currently for all mobility   Functional Level Prior   Ambulation 1-->assistive equipment   Transferring 1-->assistive equipment   Toileting 0-->independent   Bathing 0-->independent   Dressing 0-->independent   Eating 0-->independent   Communication 0-->understands/communicates without difficulty   Swallowing 0-->swallows foods/liquids without difficulty   Cognition 0 - no cognition issues reported  "  Fall history within last six months yes   Number of times patient has fallen within last six months 6  (\"about 6 times\" - 3 with cane, 3 w/o)   Which of the above functional risks had a recent onset or change? ambulation;transferring   Prior Functional Level Comment Extensive fall history with and without AD. Enjoyed last TCU stay near home in [place] that pt is open to attending again to improve strength and balance   General Information   Onset of Illness/Injury or Date of Surgery - Date 02/07/18   Referring Physician Blaze Martinez MD   Patient/Family Goals Statement To go home, however open to TCU   Pertinent History of Current Problem (include personal factors and/or comorbidities that impact the POC) Pt is a 89 yo male with PMHx significant for MI, CAD, DM, aortic stenosis, PAD, stroke, and a-fib who presents to the ED for abdominal pain and generalized weakness.   Precautions/Limitations fall precautions   General Observations Pt seated in chair eating breakfast with R IV and VSS on RA upon PT arrival.   General Info Comments Activity: Up with assist   Cognitive Status Examination   Orientation orientation to person, place and time   Level of Consciousness alert   Follows Commands and Answers Questions 75% of the time;able to follow multistep instructions   Personal Safety and Judgment at risk behaviors demonstrated   Pain Assessment   Patient Currently in Pain No   Integumentary/Edema   Integumentary/Edema no deficits were identifed   Posture    Posture Protracted shoulders   Range of Motion (ROM)   ROM Comment BLE WNL for all AROM   Strength   Strength Comments 4/5 globally with exception of 4-/5 for B hip flexion   Bed Mobility   Bed Mobility Comments SBA for sit>supine and for bridging and boosting with vc   Transfer Skills   Transfer Comments CGA for sit>stand with cueing for hand placement, poor carryover   Gait   Gait Comments CGA for 10ft with FWW, no overt LOB howver observed NBOS " "  Balance   Balance Comments Moderate static standing balance w/o UE support, good static and dynamic standing balance with UE support   Sensory Examination   Sensory Perception no deficits were identified   Coordination   Coordination no deficits were identified   Muscle Tone   Muscle Tone no deficits were identified   General Therapy Interventions   Planned Therapy Interventions balance training;gait training;manual therapy;neuromuscular re-education;strengthening;stretching;transfer training;home program guidelines;progressive activity/exercise   Clinical Impression   Criteria for Skilled Therapeutic Intervention yes, treatment indicated   PT Diagnosis impaired gait   Influenced by the following impairments impaired balance   Functional limitations due to impairments transfers, gait, stairs   Clinical Presentation Stable/Uncomplicated   Clinical Presentation Rationale 2-3 factors   Clinical Decision Making (Complexity) Low complexity   Therapy Frequency` daily   Predicted Duration of Therapy Intervention (days/wks) 3 days   Anticipated Discharge Disposition Transitional Care Facility   Risk & Benefits of therapy have been explained Yes   Patient, Family & other staff in agreement with plan of care Yes   [facility] TM \"6 Clicks\"   2016, Trustees of [facility], under license to [employer].  All rights reserved.   6 Clicks Short Forms Basic Mobility Inpatient Short Form   [facility]  \"6 Clicks\" V.2 Basic Mobility Inpatient Short Form   1. Turning from your back to your side while in a flat bed without using bedrails? 3 - A Little   2. Moving from lying on your back to sitting on the side of a flat bed without using bedrails? 3 - A Little   3. Moving to and from a bed to a chair (including a wheelchair)? 3 - A Little   4. Standing up from a chair using your arms (e.g., wheelchair, or bedside chair)? 3 - A Little   5. To walk in hospital room? 3 - A Little   6. Climbing 3-5 steps " with a railing? 2 - A Lot   Basic Mobility Raw Score (Score out of 24.Lower scores equate to lower levels of function) 17   Total Evaluation Time   Total Evaluation Time (Minutes) 10[KJ1.1]        Revision History        User Key Date/Time User Provider Type Action    > KJ1.1 2/8/2018  3:48 PM Lara Arana, PT Physical Therapist Sign            Progress Notes by Jacque Kim OT at 2/8/2018 11:59 AM     Author:  Jacque Kim OT Service:  Acute IP Rehab Author Type:  Occupational Therapist    Filed:  2/8/2018 12:00 PM Date of Service:  2/8/2018 11:59 AM Creation Time:  2/8/2018 11:59 AM    Status:  Signed :  Jacque Kim OT (Occupational Therapist)          02/08/18 0810   Quick Adds   Type of Visit Initial Occupational Therapy Evaluation   Living Environment   Lives With significant other   Living Arrangements house  ([facility])   Home Accessibility stairs to enter home;tub/shower is not walk in   Number of Stairs to Enter Home 2   Number of Stairs Within Home 7   Transportation Available family or friend will provide   Self-Care   Dominant Hand right   Usual Activity Tolerance good   Current Activity Tolerance moderate   Regular Exercise yes   Equipment Currently Used at Home cane, straight;grab bar   Functional Level Prior   Ambulation 1-->assistive equipment   Transferring 1-->assistive equipment   Toileting 0-->independent   Bathing 1-->assistive equipment   Dressing 0-->independent   Eating 0-->independent   Communication 0-->understands/communicates without difficulty   Swallowing 0-->swallows foods/liquids without difficulty   Cognition 0 - no cognition issues reported   Fall history within last six months yes   Number of times patient has fallen within last six months 6   Prior Functional Level Comment Pt does not drive SO drives and manges own meds and does the cookign, cleaning and laundry etc.    General Information   Onset of Illness/Injury or Date of  Surgery - Date 02/07/18   Referring Physician Blaze Martinez MD   Patient/Family Goals Statement Return home   Additional Occupational Profile Info/Pertinent History of Current Problem Dustin Pearce is a 89 year old male with a history of MI, CAD, DM, aortic stenosis, PAD, stroke, and atrial fibrillation currently anticoagulated on Plavix for who presents to the emergency department today for evaluation of abdominal pain and generalized weakness. diagnosed with UTI   Precautions/Limitations fall precautions   Cognitive Status Examination   Orientation orientation to person, place and time   Level of Consciousness alert   Able to Follow Commands WNL/WFL   Personal Safety (Cognitive) moderate impairment   Cognitive Comment Will continue to monitor and screen cognition   Visual Perception   Visual Perception Wears glasses  (has macular degeneration)   Visual Perception Comments has difficulty seeing details and unable to see/read small print, can see clock on the wall correct time.    Sensory Examination   Sensory Quick Adds No deficits were identified   Pain Assessment   Patient Currently in Pain No   Range of Motion (ROM)   ROM Comment B UE AROM WFL   Strength   Strength Comments B UE MMT 4-/5   Bed Mobility Skill: Rolling/Turning   Level of [place] - Bed Mobility Skill Rolling Turning contact guard   Bed Mobility Skill: Scooting/Bridging   Level of [place]: Scooting/Bridging contact guard   Bed Mobility Skill: Supine to Sit   Level of [place]: Supine/Sit minimum assist (75% patients effort)   Assistive Device: Supine/Sit bedrail   Transfer Skill: Bed to Chair/Chair to Bed   Level of [place]: Bed to Chair contact guard   Assistive Device - Transfer Skill Bed to Chair Chair to Bed Rehab Eval standard walker   Transfer Skill: Sit to Stand   Level of [place]: Sit/Stand contact guard   Assistive Device for Transfer: Sit/Stand standard walker   Transfer Skill: Toilet Transfer   Level of  "[place]: Toilet (SBA standing for toileting)   Lower Body Dressing   Level of [place]: Dress Lower Body contact guard   Toileting   Level of [place]: Toilet contact guard   Grooming   Level of [place]: Grooming contact guard   Eating/Self Feeding   Level of [place]: Eating independent   Instrumental Activities of Daily Living (IADL)   Previous Responsibilities (all iADL's provided by SO)   Activities of Daily Living Analysis   Impairments Contributing to Impaired Activities of Daily Living balance impaired;cognition impaired;strength decreased  (decreased activity tolerance)   General Therapy Interventions   Planned Therapy Interventions ADL retraining;transfer training;cognition;home program guidelines;progressive activity/exercise   Clinical Impression   Criteria for Skilled Therapeutic Interventions Met yes, treatment indicated   OT Diagnosis decrease ADL and functional mobility   Influenced by the following impairments impaired ADL and functional mobility   Assessment of Occupational Performance 3-5 Performance Deficits   Identified Performance Deficits impaired I with dressing, toilet and shower transfer.   Clinical Decision Making (Complexity) Moderate complexity   Therapy Frequency daily   Predicted Duration of Therapy Intervention (days/wks) 3 days   Anticipated Discharge Disposition Transitional Care Facility;Home with Assist;Home with Home Therapy   Risks and Benefits of Treatment have been explained. Yes   Patient, Family & other staff in agreement with plan of care Yes   [facility] AM-PAC  \"6 Clicks\" Daily Activity Inpatient Short Form   1. Putting on and taking off regular lower body clothing? 3 - A Little   2. Bathing (including washing, rinsing, drying)? 3 - A Little   3. Toileting, which includes using toilet, bedpan or urinal? 3 - A Little   4. Putting on and taking off regular upper body clothing? 4 - None   5. Taking care of personal grooming such as brushing teeth? " 3 - A Little   6. Eating meals? 4 - None   Daily Activity Raw Score (Score out of 24.Lower scores equate to lower levels of function) 20   Total Evaluation Time   Total Evaluation Time (Minutes) 10[JG1.1]        Revision History        User Key Date/Time User Provider Type Action    > JG1.1 2/8/2018 12:00 PM Jacque Kim, OT Occupational Therapist Sign

## 2018-02-07 NOTE — IP AVS SNAPSHOT
MRN:[id number]                      After Visit Summary   2/7/2018    Dustin Pearce    MRN: [id number]           Thank you!     Thank you for choosing [facility] for your care. Our goal is always to provide you with excellent care. Hearing back from our patients is one way we can continue to improve our services. Please take a few minutes to complete the written survey that you may receive in the mail after you visit with us. Thank you!        Patient Information     Date Of Birth          1/31/1928        Designated Caregiver       Most Recent Value    Caregiver    Will someone help with your care after discharge? yes    Name of designated caregiver Halina    Phone number of caregiver [phone]    Caregiver address same as pt      About your hospital stay     You were admitted on:  February 8, 2018 You last received care in the:  [place] [address] [facility]    You were discharged on:  February 11, 2018        Reason for your hospital stay       UTI with weakness                  Who to Call     For medical emergencies, please call 911.  For non-urgent questions about your medical care, please call your primary care provider or clinic, [phone]          Attending Provider     Provider Specialty    Trierweiler, Chad A, MD Emergency Medicine    [facility], Blaze Dove MD Internal Medicine       Primary Care Provider Office Phone # Fax #    Matt Morrissey [phone]      After Care Instructions     Activity - Up with nursing assistance           Advance Diet as Tolerated       Follow this diet upon discharge: Orders Placed This Encounter      Room Service      Snacks/Supplements Adult: Glucerna (Adult); Between Meals      Moderate Consistent CHO Diet            General info for SNF       Length of Stay Estimate: Short Term Care: Estimated # of Days <30  Condition at Discharge: Improving  Level of care:skilled   Rehabilitation Potential: Good  Admission H&P remains  valid and up-to-date: Yes  Recent Chemotherapy: N/A  Use Nursing Home Standing Orders: Yes                  Your next 10 appointments already scheduled     Feb 15, 2018  6:00 PM CST   Office Visit with Matt Morrissey MD   [facility]    [address] [facility] [address] [phone]           Bring a current list of meds and any records pertaining to this visit. For Physicals, please bring immunization records and any forms needing to be filled out. Please arrive 10 minutes early to complete paperwork.            Apr 19, 2018 11:00 AM CDT   US CAROTID RIGHT with [email or web address]   [facility] ([facility])    [address]  [facility] [address]   [phone]           Please bring a list of your medicines (including vitamins, minerals and over-the-counter drugs). Also, tell your doctor about any allergies you may have. Wear comfortable clothes and leave your valuables at home.  You do not need to do anything special to prepare for your exam.  Please call the Imaging Department at your exam site with any questions.            Apr 19, 2018 11:45 AM CDT   Return Visit with Roland Rodriguez MD   [facility] ([facility])    [address]  [facility] [address] [phone]              Additional Services     Occupational Therapy Adult Consult       Evaluate and treat as clinically indicated.    Reason:   Weakness and Physical Deconditioning            Physical Therapy Adult Consult       Evaluate and treat as clinically indicated.    Reason:  Weakness and Physical Deconditioning                  Pending Results     Date and Time Order Name Status Description    2/8/2018 0239 Urine Culture Aerobic Bacterial Preliminary             Statement of Approval     Ordered          02/11/18 1226  I have reviewed and agree with all  "the recommendations and orders detailed in this document.  EFFECTIVE NOW     Approved and electronically signed by:  Matt Purcell MD             Admission Information     Date & Time Provider Department Dept. Phone    2018 Blaze Martinez MD [place] [address] [facility] [phone]      Your Vitals Were     Blood Pressure Pulse Temperature Respirations Height Weight    117/61 (BP Location: Right arm) 73 98.1  F (36.7  C) (Oral) 18 1.651 m (5' 5\") 69.2 kg (152 lb 8 oz)    Pulse Oximetry BMI (Body Mass Index)                95% 25.38 kg/m2          MyChart Information     [facility] lets you send messages to your doctor, view your test results, renew your prescriptions, schedule appointments and more. To sign up, go to [email or web address] . Click on \"Log in\" on the left side of the screen, which will take you to the Welcome page. Then click on \"Sign up Now\" on the right side of the page.     You will be asked to enter the access code listed below, as well as some personal information. Please follow the directions to create your username and password.     Your access code is: [email or web address]  Expires: 2018 11:54 AM     Your access code will  in 90 days. If you need help or a new code, please call your [place] clinic or [phone].        Care EveryWhere ID     This is your Care EveryWhere ID. This could be used by other organizations to access your [place] medical records  [id number]        Equal Access to Services     [facility]: Hadii kinza julien hadasho Sogilbertali, waaxda luqadaha, qaybta kaalmada floresitaegyaamparo, maurice miranda. So [facility] [phone].    ATENCIÓN: Si habla español, tiene a garvey disposición servicios gratuitos de asistencia lingüística. Llame al [phone].    We comply with applicable federal civil rights laws and [place] laws. We do not discriminate on the basis of race, color, national origin, age, disability, sex, sexual " orientation, or gender identity.               Review of your medicines      START taking        Dose / Directions    ampicillin 500 MG capsule   Commonly known as:  PRINCIPEN   Indication:  Urinary Tract Infection, E faecalis UTI   Used for:  Urinary tract infection with hematuria, site unspecified        Dose:  500 mg   Take 1 capsule (500 mg) by mouth every 6 hours for 4 days   Quantity:  16 capsule   Refills:  0         CONTINUE these medicines which may have CHANGED, or have new prescriptions. If we are uncertain of the size of tablets/capsules you have at home, strength may be listed as something that might have changed.        Dose / Directions    acetaminophen 325 MG tablet   Commonly known as:  TYLENOL   This may have changed:    - medication strength  - when to take this  - reasons to take this  - additional instructions   Used for:  Generalized muscle weakness, Abdominal pain, right lower quadrant, Urinary tract infection with hematuria, site unspecified        Dose:  650 mg   Take 2 tablets (650 mg) by mouth every 4 hours as needed (fever or pain)   Quantity:  100 tablet   Refills:  0       cholecalciferol 87304 UNITS capsule   Commonly known as:  VITAMIN D3   This may have changed:  additional instructions   Used for:  Vitamin D deficiency   Notes to Patient:  Resume as per orders        Dose:  1 capsule   Take 1 capsule (50,000 Units) by mouth once a week   Quantity:  12 capsule   Refills:  0         CONTINUE these medicines which have NOT CHANGED        Dose / Directions    AMITRIPTYLINE HCL PO   Notes to Patient:  Resume as per orders        Dose:  25 mg   Take 25 mg by mouth nightly as needed for sleep   Refills:  0       aspirin 81 MG EC tablet   Used for:  Transient cerebral ischemia, unspecified type        Dose:  81 mg   Take 1 tablet (81 mg) by mouth daily   Quantity:  30 tablet   Refills:  0       ATORVASTATIN CALCIUM PO   Notes to Patient:  Received dose this AM        Dose:  40 mg   Take 40  mg by mouth At Bedtime   Refills:  0       blood glucose monitoring test strip   Commonly known as:  no brand specified   Used for:  Hypoglycemia        Use to test blood sugar three times daily or as directed.   Quantity:  300 each   Refills:  3       DULoxetine 30 MG EC capsule   Commonly known as:  CYMBALTA   Used for:  Polyneuropathy associated with underlying disease (H)        Dose:  30 mg   Take 1 capsule (30 mg) by mouth daily   Quantity:  90 capsule   Refills:  3       exenatide 5 MCG/0.02ML Sopn injection   Commonly known as:  BYETTA   Used for:  Type 2 diabetes mellitus with other specified complication, without long-term current use of insulin (H)        Dose:  5 mcg   Inject 5 mcg Subcutaneous 2 times daily (before meals)   Quantity:  1 Syringe   Refills:  0       glipiZIDE 10 MG tablet   Commonly known as:  GLUCOTROL   Used for:  Type 2 diabetes mellitus with diabetic peripheral angiopathy without gangrene, without long-term current use of insulin (H)        Dose:  10 mg   Take 1 tablet (10 mg) by mouth 2 times daily (before meals)   Quantity:  180 tablet   Refills:  3       insulin pen needle 32G X 4 MM   Commonly known as:  BD JOE U/F   Used for:  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   Quantity:  360 each   Refills:  11       ipratropium 0.03 % spray   Commonly known as:  ATROVENT        Dose:  2 spray   Spray 2 sprays into both nostrils 2 times daily as needed   Refills:  0       LISINOPRIL PO        Dose:  2.5 mg   Take 2.5 mg by mouth daily   Refills:  0       MAGNESIUM OXIDE PO   Indication:  Disorder with Low Magnesium Levels   Notes to Patient:  Resume as per orders        Dose:  400 mg   Take 400 mg by mouth 2 times daily   Refills:  0       METFORMIN HCL PO        Dose:  1000 mg   Take 1,000 mg by mouth 2 times daily (with meals)   Refills:  0       metoprolol succinate 25 MG 24 hr tablet   Commonly known as:  TOPROL-XL   Indication:   High Blood Pressure Disorder   Used for:  Cardiomyopathy, unspecified type (H)        Dose:  12.5 mg   Take 0.5 tablets (12.5 mg) by mouth daily   Refills:  0       OMEPRAZOLE PO        Dose:  40 mg   Take 40 mg by mouth daily as needed   Refills:  0       order for DME   Used for:  Type 2 diabetes mellitus without complication (H)        Equipment being ordered: True Matrix Blood Glucose meter.   Quantity:  1 Device   Refills:  0       PLAVIX PO   Used for:  Cough        Dose:  75 mg   Take 75 mg by mouth daily   Refills:  0       polyethylene glycol Packet   Commonly known as:  MIRALAX/GLYCOLAX        Dose:  17 g   Take 17 g by mouth daily as needed for constipation   Refills:  0       tamsulosin 0.4 MG capsule   Commonly known as:  FLOMAX   Used for:  Benign non-nodular prostatic hyperplasia with lower urinary tract symptoms        Dose:  0.4 mg   Take 1 capsule (0.4 mg) by mouth daily   Quantity:  90 capsule   Refills:  3            Where to get your medicines      These medications were sent to [facility] [place]  [place] - [address]  [address] [facility] [address], [facility] [address]     Phone:  [phone]     acetaminophen 325 MG tablet    ampicillin 500 MG capsule                Protect others around you: Learn how to safely use, store and throw away your medicines at www.disposemymeds.org.        ANTIBIOTIC INSTRUCTION     You've Been Prescribed an Antibiotic - Now What?  Your healthcare team thinks that you or your loved one might have an infection. Some infections can be treated with antibiotics, which are powerful, life-saving drugs. Like all medications, antibiotics have side effects and should only be used when necessary. There are some important things you should know about your antibiotic treatment.      Your healthcare team may run tests before you start taking an antibiotic.    Your team may take samples (e.g., from your blood, urine or other areas) to run tests to look for bacteria.  These test can be important to determine if you need an antibiotic at all and, if you do, which antibiotic will work best.      Within a few days, your healthcare team might change or even stop your antibiotic.    Your team may start you on an antibiotic while they are working to find out what is making you sick.    Your team might change your antibiotic because test results show that a different antibiotic would be better to treat your infection.    In some cases, once your team has more information, they learn that you do not need an antibiotic at all. They may find out that you don't have an infection, or that the antibiotic you're taking won't work against your infection. For example, an infection caused by a virus can't be treated with antibiotics. Staying on an antibiotic when you don't need it is more likely to be harmful than helpful.      You may experience side effects from your antibiotic.    Like all medications, antibiotics have side effects. Some of these can be serious.    Let you healthcare team know if you have any known allergies when you are admitted to the hospital.    One significant side effect of nearly all antibiotics is the risk of severe and sometimes deadly diarrhea caused by Clostridium difficile (C. Difficile). This occurs when a person takes antibiotics because some good germs are destroyed. Antibiotic use allows C. diificile to take over, putting patients at high risk for this serious infection.    As a patient or caregiver, it is important to understand your or your loved one's antibiotic treatment. It is especially important for caregivers to speak up when patients can't speak for themselves. Here are some important questions to ask your healthcare team.    What infection is this antibiotic treating and how do you know I have that infection?    What side effects might occur from this antibiotic?    How long will I need to take this antibiotic?    Is it safe to take this antibiotic  with other medications or supplements (e.g., vitamins) that I am taking?     Are there any special directions I need to know about taking this antibiotic? For example, should I take it with food?    How will I be monitored to know whether my infection is responding to the antibiotic?    What tests may help to make sure the right antibiotic is prescribed for me?      Information provided by:  www.cdc.gov/getsmart  U.S. Department of Health and Human Services  Centers for disease Control and Prevention  National Center for Emerging and Zoonotic Infectious Diseases  Division of Healthcare Quality Promotion             Medication List: This is a list of all your medications and when to take them. Check marks below indicate your daily home schedule. Keep this list as a reference.      Medications           Morning Afternoon Evening Bedtime As Needed    acetaminophen 325 MG tablet   Commonly known as:  TYLENOL   Take 2 tablets (650 mg) by mouth every 4 hours as needed (fever or pain)   Next Dose Due:  anytime                                   AMITRIPTYLINE HCL PO   Take 25 mg by mouth nightly as needed for sleep   Notes to Patient:  Resume as per orders                                   ampicillin 500 MG capsule   Commonly known as:  PRINCIPEN   Take 1 capsule (500 mg) by mouth every 6 hours for 4 days   Last time this was given:  500 mg on 2/11/2018 12:10 PM   Next Dose Due:  2/11/18 @1800                                            aspirin 81 MG EC tablet   Take 1 tablet (81 mg) by mouth daily   Last time this was given:  81 mg on 2/11/2018  8:33 AM   Next Dose Due:  2/12/18 @0800                                   ATORVASTATIN CALCIUM PO   Take 40 mg by mouth At Bedtime   Last time this was given:  80 mg on 2/11/2018  8:33 AM   Notes to Patient:  Received dose this AM                                   blood glucose monitoring test strip   Commonly known as:  no brand specified   Use to test blood sugar three times daily  or as directed.                                cholecalciferol 46487 UNITS capsule   Commonly known as:  VITAMIN D3   Take 1 capsule (50,000 Units) by mouth once a week   Notes to Patient:  Resume as per orders                                DULoxetine 30 MG EC capsule   Commonly known as:  CYMBALTA   Take 1 capsule (30 mg) by mouth daily   Last time this was given:  30 mg on 2/11/2018  8:32 AM   Next Dose Due:  2/12/18 @0800                                   exenatide 5 MCG/0.02ML Sopn injection   Commonly known as:  BYETTA   Inject 5 mcg Subcutaneous 2 times daily (before meals)   Last time this was given:  5 mcg on 2/11/2018  8:10 AM   Next Dose Due:  2/11/18 @1700                                      glipiZIDE 10 MG tablet   Commonly known as:  GLUCOTROL   Take 1 tablet (10 mg) by mouth 2 times daily (before meals)   Last time this was given:  10 mg on 2/11/2018  6:33 AM   Next Dose Due:  2/11/18 @1700                                      insulin pen needle 32G X 4 MM   Commonly known as:  BD JOE U/F   Use 4  times daily or as directed.                                ipratropium 0.03 % spray   Commonly known as:  ATROVENT   Spray 2 sprays into both nostrils 2 times daily as needed   Last time this was given:  2 sprays on 2/11/2018  6:33 AM                                   LISINOPRIL PO   Take 2.5 mg by mouth daily   Last time this was given:  2.5 mg on 2/11/2018  8:33 AM   Next Dose Due:  2/12/18 @0800                                   MAGNESIUM OXIDE PO   Take 400 mg by mouth 2 times daily   Notes to Patient:  Resume as per orders                                METFORMIN HCL PO   Take 1,000 mg by mouth 2 times daily (with meals)   Last time this was given:  1,000 mg on 2/11/2018  8:33 AM   Next Dose Due:  2/11/18 @1800                                      metoprolol succinate 25 MG 24 hr tablet   Commonly known as:  TOPROL-XL   Take 0.5 tablets (12.5 mg) by mouth daily   Last time this was given:  12.5 mg on  2/11/2018  8:32 AM   Next Dose Due:  2/12/18 @0800                                   OMEPRAZOLE PO   Take 40 mg by mouth daily as needed   Last time this was given:  40 mg on 2/11/2018  9:59 AM                                   order for DME   Equipment being ordered: [employer] Blood Glucose meter.                                PLAVIX PO   Take 75 mg by mouth daily   Last time this was given:  75 mg on 2/11/2018  8:33 AM   Next Dose Due:  2/12/18 @0800                                   polyethylene glycol Packet   Commonly known as:  MIRALAX/GLYCOLAX   Take 17 g by mouth daily as needed for constipation   Last time this was given:  17 g on 2/10/2018  1:55 AM                                   tamsulosin 0.4 MG capsule   Commonly known as:  FLOMAX   Take 1 capsule (0.4 mg) by mouth daily   Last time this was given:  0.4 mg on 2/11/2018  8:33 AM   Next Dose Due:  2/12/18 @0800

## 2018-02-07 NOTE — MR AVS SNAPSHOT
After Visit Summary   2/7/2018    Dustin Pearce    MRN: [id number]           Patient Information     Date Of Birth          1/31/1928        Visit Information        Provider Department      2/7/2018 8:40 PM Makenna Esparza APRN CNP [facility]        Today's Diagnoses     Abdominal pain, right lateral    -  1    Nausea        Weakness generalized           Follow-ups after your visit        Your next 10 appointments already scheduled     Feb 15, 2018  6:00 PM CST   [facility]    [address] [facility] [address] [phone]           Bring a current list of meds and any records pertaining to this visit. For Physicals, please bring immunization records and any forms needing to be filled out. Please arrive 10 minutes early to complete paperwork.            Apr 19, 2018 11:00 AM CDT   US CAROTID RIGHT with [id number]   [facility] ([facility])    [address]  [facility] [address]   [phone]           Please bring a list of your medicines (including vitamins, minerals and over-the-counter drugs). Also, tell your doctor about any allergies you may have. Wear comfortable clothes and leave your valuables at home.  You do not need to do anything special to prepare for your exam.  Please call the Imaging Department at your exam site with any questions.            Apr 19, 2018 11:45 AM CDT   Return Visit with Roland Rodriguez MD   [facility] ([facility])    [address]  [facility] [address] [phone]              Who to contact     If you have questions or need follow up information about today's clinic visit or your schedule please contact [facility] directly at [phone].  Normal or non-critical lab  "and imaging results will be communicated to you by MyChart, letter or phone within 4 business days after the clinic has received the results. If you do not hear from us within 7 days, please contact the clinic through [email or web address] or phone. If you have a critical or abnormal lab result, we will notify you by phone as soon as possible.  Submit refill requests through [employer] or call your pharmacy and they will forward the refill request to us. Please allow 3 business days for your refill to be completed.          Additional Information About Your Visit        [employer] Information     [employer] lets you send messages to your doctor, view your test results, renew your prescriptions, schedule appointments and more. To sign up, go to [email or web address] . Click on \"Log in\" on the left side of the screen, which will take you to the Welcome page. Then click on \"Sign up Now\" on the right side of the page.     You will be asked to enter the access code listed below, as well as some personal information. Please follow the directions to create your username and password.     Your access code is: [email or web address]  Expires: 2018 11:54 AM     Your access code will  in 90 days. If you need help or a new code, please call your [place] clinic or [phone].        Care EveryWhere ID     This is your Care EveryWhere ID. This could be used by other organizations to access your [place] medical records  [id number]        Your Vitals Were     Pulse Temperature Respirations Pulse Oximetry          99 97.5  F (36.4  C) (Oral) 16 98%         Blood Pressure from Last 3 Encounters:   18 145/85   18 101/41   18 123/63    Weight from Last 3 Encounters:   18 168 lb 9.6 oz (76.5 kg)   18 168 lb 9.6 oz (76.5 kg)   18 164 lb (74.4 kg)              Today, you had the following     No orders found for display       Primary Care Provider Office Phone # Fax #    Matt Morrissey [phone]    "    [facility] [address]  [facility] [address]        Equal Access to Services     LAURENCE JACOBSON : Hadii kinza julien hadmoriso Sogilbertali, waaxda luqadaha, qaybta kaalmada floresitaglenna, maurice marcia shyafshin canashelena svetahelenkyle miranda. So [facility] [phone].    ATENCIÓN: Si habla español, tiene a garvey disposición servicios gratuitos de asistencia lingüística. Llame al [phone].    We comply with applicable federal civil rights laws and [place] laws. We do not discriminate on the basis of race, color, national origin, age, disability, sex, sexual orientation, or gender identity.            Thank you!     Thank you for choosing [facility]  for your care. Our goal is always to provide you with excellent care. Hearing back from our patients is one way we can continue to improve our services. Please take a few minutes to complete the written survey that you may receive in the mail after your visit with us. Thank you!             Your Updated Medication List - Protect others around you: Learn how to safely use, store and throw away your medicines at www.disposemymeds.org.          This list is accurate as of 2/7/18  9:24 PM.  Always use your most recent med list.                   Brand Name Dispense Instructions for use Diagnosis    ACETAMINOPHEN PO      Take 650 mg by mouth as needed for pain Give 1 tablet by mouth as needed for pain not to exceed 3000mg per day.        AMITRIPTYLINE HCL PO      Take 25 mg by mouth nightly as needed for sleep        aspirin 81 MG EC tablet     30 tablet    Take 1 tablet (81 mg) by mouth daily    Transient cerebral ischemia, unspecified type       ATORVASTATIN CALCIUM PO      Take 80 mg by mouth daily        blood glucose monitoring test strip    no brand specified    300 each    Use to test blood sugar three times daily or as directed.    Hypoglycemia       cholecalciferol 39011 UNITS capsule    VITAMIN D3    12 capsule    Take 1 capsule (50,000 Units) by  mouth once a week    Vitamin D deficiency       DULoxetine 30 MG EC capsule    CYMBALTA    90 capsule    Take 1 capsule (30 mg) by mouth daily    Polyneuropathy associated with underlying disease (H)       exenatide 5 MCG/0.02ML Sopn injection    BYETTA    1 Syringe    Inject 5 mcg Subcutaneous 2 times daily (before meals)    Type 2 diabetes mellitus with other specified complication, without long-term current use of insulin (H)       glipiZIDE 10 MG tablet    GLUCOTROL    180 tablet    Take 1 tablet (10 mg) by mouth 2 times daily (before meals)    Type 2 diabetes mellitus with diabetic peripheral angiopathy without gangrene, without long-term current use of insulin (H)       insulin pen needle 32G X 4 MM    BD JOE U/F    360 each    Use 4  times daily or as directed.    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)       ipratropium 0.03 % spray    ATROVENT     Spray 2 sprays into both nostrils every 12 hours        LISINOPRIL PO      Take 2.5 mg by mouth daily        MAGNESIUM OXIDE PO      Take 400 mg by mouth 2 times daily        METFORMIN HCL PO      Take 1,000 mg by mouth 2 times daily (with meals)        metoprolol succinate 25 MG 24 hr tablet    TOPROL-XL     Take 0.5 tablets (12.5 mg) by mouth daily    Cardiomyopathy, unspecified type (H)       omeprazole 40 MG capsule    priLOSEC    90 capsule    Take 1 capsule (40 mg) by mouth daily Take 30-60 minutes before a meal.    Other acute gastritis without hemorrhage       order for DME     1 Device    Equipment being ordered: True Matrix Blood Glucose meter.    Type 2 diabetes mellitus without complication (H)       PLAVIX PO      Take 75 mg by mouth daily    Cough       polyethylene glycol Packet    MIRALAX/GLYCOLAX     Take 17 g by mouth daily as needed for constipation        tamsulosin 0.4 MG capsule    FLOMAX    90 capsule    Take 1 capsule (0.4 mg) by mouth daily    Benign non-nodular prostatic hyperplasia with lower urinary tract symptoms

## 2018-02-08 ENCOUNTER — APPOINTMENT (OUTPATIENT)
Dept: PHYSICAL THERAPY | Facility: CLINIC | Age: 83
DRG: 690 | End: 2018-02-08
Attending: INTERNAL MEDICINE
Payer: MEDICARE

## 2018-02-08 ENCOUNTER — APPOINTMENT (OUTPATIENT)
Dept: CT IMAGING | Facility: CLINIC | Age: 83
DRG: 690 | End: 2018-02-08
Attending: EMERGENCY MEDICINE
Payer: MEDICARE

## 2018-02-08 ENCOUNTER — APPOINTMENT (OUTPATIENT)
Dept: OCCUPATIONAL THERAPY | Facility: CLINIC | Age: 83
DRG: 690 | End: 2018-02-08
Attending: INTERNAL MEDICINE
Payer: MEDICARE

## 2018-02-08 PROBLEM — N39.0 UTI (URINARY TRACT INFECTION): Status: ACTIVE | Noted: 2018-02-08

## 2018-02-08 LAB
ALBUMIN SERPL-MCNC: 4 G/DL (ref 3.4–5)
ALBUMIN UR-MCNC: 100 MG/DL
ALP SERPL-CCNC: 81 U/L (ref 40–150)
ALT SERPL W P-5'-P-CCNC: 30 U/L (ref 0–70)
ANION GAP SERPL CALCULATED.3IONS-SCNC: 10 MMOL/L (ref 3–14)
APPEARANCE UR: ABNORMAL
AST SERPL W P-5'-P-CCNC: 18 U/L (ref 0–45)
BACTERIA #/AREA URNS HPF: ABNORMAL /HPF
BASOPHILS # BLD AUTO: 0 10E9/L (ref 0–0.2)
BASOPHILS NFR BLD AUTO: 0.3 %
BILIRUB SERPL-MCNC: 0.6 MG/DL (ref 0.2–1.3)
BILIRUB UR QL STRIP: NEGATIVE
BUN SERPL-MCNC: 19 MG/DL (ref 7–30)
CALCIUM SERPL-MCNC: 9.5 MG/DL (ref 8.5–10.1)
CHLORIDE SERPL-SCNC: 98 MMOL/L (ref 94–109)
CO2 SERPL-SCNC: 28 MMOL/L (ref 20–32)
COLOR UR AUTO: YELLOW
CREAT SERPL-MCNC: 0.91 MG/DL (ref 0.66–1.25)
DIFFERENTIAL METHOD BLD: NORMAL
EOSINOPHIL # BLD AUTO: 0.1 10E9/L (ref 0–0.7)
EOSINOPHIL NFR BLD AUTO: 0.6 %
ERYTHROCYTE [DISTWIDTH] IN BLOOD BY AUTOMATED COUNT: 13.8 % (ref 10–15)
GFR SERPL CREATININE-BSD FRML MDRD: 78 ML/MIN/1.7M2
GLUCOSE BLDC GLUCOMTR-MCNC: 102 MG/DL (ref 70–99)
GLUCOSE BLDC GLUCOMTR-MCNC: 156 MG/DL (ref 70–99)
GLUCOSE BLDC GLUCOMTR-MCNC: 177 MG/DL (ref 70–99)
GLUCOSE SERPL-MCNC: 179 MG/DL (ref 70–99)
GLUCOSE UR STRIP-MCNC: NEGATIVE MG/DL
HCT VFR BLD AUTO: 44.5 % (ref 40–53)
HGB BLD-MCNC: 14.5 G/DL (ref 13.3–17.7)
HGB UR QL STRIP: ABNORMAL
IMM GRANULOCYTES # BLD: 0 10E9/L (ref 0–0.4)
IMM GRANULOCYTES NFR BLD: 0.3 %
KETONES UR STRIP-MCNC: NEGATIVE MG/DL
LEUKOCYTE ESTERASE UR QL STRIP: ABNORMAL
LIPASE SERPL-CCNC: 135 U/L (ref 73–393)
LYMPHOCYTES # BLD AUTO: 2.7 10E9/L (ref 0.8–5.3)
LYMPHOCYTES NFR BLD AUTO: 30.8 %
MCH RBC QN AUTO: 30.2 PG (ref 26.5–33)
MCHC RBC AUTO-ENTMCNC: 32.6 G/DL (ref 31.5–36.5)
MCV RBC AUTO: 93 FL (ref 78–100)
MONOCYTES # BLD AUTO: 1 10E9/L (ref 0–1.3)
MONOCYTES NFR BLD AUTO: 11.4 %
NEUTROPHILS # BLD AUTO: 5 10E9/L (ref 1.6–8.3)
NEUTROPHILS NFR BLD AUTO: 56.6 %
NITRATE UR QL: NEGATIVE
NRBC # BLD AUTO: 0 10*3/UL
NRBC BLD AUTO-RTO: 0 /100
PH UR STRIP: 6.5 PH (ref 5–7)
PLATELET # BLD AUTO: 222 10E9/L (ref 150–450)
POTASSIUM SERPL-SCNC: 4.1 MMOL/L (ref 3.4–5.3)
PROT SERPL-MCNC: 7.6 G/DL (ref 6.8–8.8)
RBC # BLD AUTO: 4.8 10E12/L (ref 4.4–5.9)
RBC #/AREA URNS AUTO: 119 /HPF (ref 0–2)
SODIUM SERPL-SCNC: 136 MMOL/L (ref 133–144)
SOURCE: ABNORMAL
SP GR UR STRIP: 1.02 (ref 1–1.03)
TROPONIN I SERPL-MCNC: 0.04 UG/L (ref 0–0.04)
UROBILINOGEN UR STRIP-MCNC: NORMAL MG/DL (ref 0–2)
WBC # BLD AUTO: 8.9 10E9/L (ref 4–11)
WBC #/AREA URNS AUTO: >182 /HPF (ref 0–2)
WBC CLUMPS #/AREA URNS HPF: PRESENT /HPF

## 2018-02-08 PROCEDURE — 25000125 ZZHC RX 250: Performed by: EMERGENCY MEDICINE

## 2018-02-08 PROCEDURE — 99207 ZZC CDG-HISTORY COMP: MEETS EXP. PROBLEM FOCUSED-DOWN CODED LACK OF ROS: CPT | Performed by: INTERNAL MEDICINE

## 2018-02-08 PROCEDURE — 27210995 ZZH RX 272: Performed by: EMERGENCY MEDICINE

## 2018-02-08 PROCEDURE — 87086 URINE CULTURE/COLONY COUNT: CPT | Performed by: EMERGENCY MEDICINE

## 2018-02-08 PROCEDURE — 25000128 H RX IP 250 OP 636: Performed by: EMERGENCY MEDICINE

## 2018-02-08 PROCEDURE — A9270 NON-COVERED ITEM OR SERVICE: HCPCS | Mod: GY | Performed by: INTERNAL MEDICINE

## 2018-02-08 PROCEDURE — 97530 THERAPEUTIC ACTIVITIES: CPT | Mod: GP | Performed by: PHYSICAL THERAPIST

## 2018-02-08 PROCEDURE — 87186 SC STD MICRODIL/AGAR DIL: CPT | Performed by: EMERGENCY MEDICINE

## 2018-02-08 PROCEDURE — 87088 URINE BACTERIA CULTURE: CPT | Performed by: EMERGENCY MEDICINE

## 2018-02-08 PROCEDURE — 74177 CT ABD & PELVIS W/CONTRAST: CPT

## 2018-02-08 PROCEDURE — 40000193 ZZH STATISTIC PT WARD VISIT: Performed by: PHYSICAL THERAPIST

## 2018-02-08 PROCEDURE — 40000133 ZZH STATISTIC OT WARD VISIT: Performed by: OCCUPATIONAL THERAPIST

## 2018-02-08 PROCEDURE — 00000146 ZZHCL STATISTIC GLUCOSE BY METER IP

## 2018-02-08 PROCEDURE — 99221 1ST HOSP IP/OBS SF/LOW 40: CPT | Mod: AI | Performed by: INTERNAL MEDICINE

## 2018-02-08 PROCEDURE — 97166 OT EVAL MOD COMPLEX 45 MIN: CPT | Mod: GO | Performed by: OCCUPATIONAL THERAPIST

## 2018-02-08 PROCEDURE — 96361 HYDRATE IV INFUSION ADD-ON: CPT

## 2018-02-08 PROCEDURE — 97535 SELF CARE MNGMENT TRAINING: CPT | Mod: GO | Performed by: OCCUPATIONAL THERAPIST

## 2018-02-08 PROCEDURE — 97530 THERAPEUTIC ACTIVITIES: CPT | Mod: GO | Performed by: OCCUPATIONAL THERAPIST

## 2018-02-08 PROCEDURE — 25000128 H RX IP 250 OP 636: Performed by: INTERNAL MEDICINE

## 2018-02-08 PROCEDURE — 97161 PT EVAL LOW COMPLEX 20 MIN: CPT | Mod: GP | Performed by: PHYSICAL THERAPIST

## 2018-02-08 PROCEDURE — 97116 GAIT TRAINING THERAPY: CPT | Mod: GP | Performed by: PHYSICAL THERAPIST

## 2018-02-08 PROCEDURE — 25000132 ZZH RX MED GY IP 250 OP 250 PS 637: Mod: GY | Performed by: INTERNAL MEDICINE

## 2018-02-08 PROCEDURE — 12000000 ZZH R&B MED SURG/OB

## 2018-02-08 PROCEDURE — 81001 URINALYSIS AUTO W/SCOPE: CPT | Performed by: EMERGENCY MEDICINE

## 2018-02-08 RX ORDER — GLIPIZIDE 10 MG/1
10 TABLET ORAL
Status: DISCONTINUED | OUTPATIENT
Start: 2018-02-08 | End: 2018-02-11 | Stop reason: HOSPADM

## 2018-02-08 RX ORDER — ACETAMINOPHEN 325 MG/1
650 TABLET ORAL EVERY 4 HOURS PRN
Status: DISCONTINUED | OUTPATIENT
Start: 2018-02-08 | End: 2018-02-09

## 2018-02-08 RX ORDER — ACETAMINOPHEN 325 MG/1
650 TABLET ORAL EVERY 4 HOURS PRN
Status: DISCONTINUED | OUTPATIENT
Start: 2018-02-08 | End: 2018-02-11 | Stop reason: HOSPADM

## 2018-02-08 RX ORDER — DULOXETIN HYDROCHLORIDE 30 MG/1
30 CAPSULE, DELAYED RELEASE ORAL DAILY
Status: DISCONTINUED | OUTPATIENT
Start: 2018-02-08 | End: 2018-02-11 | Stop reason: HOSPADM

## 2018-02-08 RX ORDER — SODIUM CHLORIDE 9 MG/ML
INJECTION, SOLUTION INTRAVENOUS CONTINUOUS
Status: DISCONTINUED | OUTPATIENT
Start: 2018-02-08 | End: 2018-02-09

## 2018-02-08 RX ORDER — ASPIRIN 81 MG/1
81 TABLET ORAL DAILY
Status: DISCONTINUED | OUTPATIENT
Start: 2018-02-08 | End: 2018-02-11 | Stop reason: HOSPADM

## 2018-02-08 RX ORDER — TAMSULOSIN HYDROCHLORIDE 0.4 MG/1
0.4 CAPSULE ORAL DAILY
Status: DISCONTINUED | OUTPATIENT
Start: 2018-02-08 | End: 2018-02-11 | Stop reason: HOSPADM

## 2018-02-08 RX ORDER — ONDANSETRON 2 MG/ML
4 INJECTION INTRAMUSCULAR; INTRAVENOUS EVERY 6 HOURS PRN
Status: DISCONTINUED | OUTPATIENT
Start: 2018-02-08 | End: 2018-02-11 | Stop reason: HOSPADM

## 2018-02-08 RX ORDER — DEXTROSE MONOHYDRATE 25 G/50ML
25-50 INJECTION, SOLUTION INTRAVENOUS
Status: DISCONTINUED | OUTPATIENT
Start: 2018-02-08 | End: 2018-02-11 | Stop reason: HOSPADM

## 2018-02-08 RX ORDER — ATORVASTATIN CALCIUM 40 MG/1
80 TABLET, FILM COATED ORAL DAILY
Status: DISCONTINUED | OUTPATIENT
Start: 2018-02-08 | End: 2018-02-11 | Stop reason: HOSPADM

## 2018-02-08 RX ORDER — NICOTINE POLACRILEX 4 MG
15-30 LOZENGE BUCCAL
Status: DISCONTINUED | OUTPATIENT
Start: 2018-02-08 | End: 2018-02-11 | Stop reason: HOSPADM

## 2018-02-08 RX ORDER — ONDANSETRON 4 MG/1
4 TABLET, ORALLY DISINTEGRATING ORAL EVERY 6 HOURS PRN
Status: DISCONTINUED | OUTPATIENT
Start: 2018-02-08 | End: 2018-02-11 | Stop reason: HOSPADM

## 2018-02-08 RX ORDER — NALOXONE HYDROCHLORIDE 0.4 MG/ML
.1-.4 INJECTION, SOLUTION INTRAMUSCULAR; INTRAVENOUS; SUBCUTANEOUS
Status: DISCONTINUED | OUTPATIENT
Start: 2018-02-08 | End: 2018-02-11 | Stop reason: HOSPADM

## 2018-02-08 RX ORDER — IPRATROPIUM BROMIDE 21 UG/1
2 SPRAY, METERED NASAL EVERY 12 HOURS
Status: DISCONTINUED | OUTPATIENT
Start: 2018-02-08 | End: 2018-02-11 | Stop reason: HOSPADM

## 2018-02-08 RX ORDER — IOPAMIDOL 755 MG/ML
81 INJECTION, SOLUTION INTRAVASCULAR ONCE
Status: COMPLETED | OUTPATIENT
Start: 2018-02-08 | End: 2018-02-08

## 2018-02-08 RX ORDER — POLYETHYLENE GLYCOL 3350 17 G/17G
17 POWDER, FOR SOLUTION ORAL DAILY PRN
Status: DISCONTINUED | OUTPATIENT
Start: 2018-02-08 | End: 2018-02-11 | Stop reason: HOSPADM

## 2018-02-08 RX ORDER — CLOPIDOGREL BISULFATE 75 MG/1
75 TABLET ORAL DAILY
Status: DISCONTINUED | OUTPATIENT
Start: 2018-02-08 | End: 2018-02-11 | Stop reason: HOSPADM

## 2018-02-08 RX ORDER — LISINOPRIL 2.5 MG/1
2.5 TABLET ORAL DAILY
Status: DISCONTINUED | OUTPATIENT
Start: 2018-02-08 | End: 2018-02-11 | Stop reason: HOSPADM

## 2018-02-08 RX ADMIN — IPRATROPIUM BROMIDE 2 SPRAY: 21 SPRAY NASAL at 20:08

## 2018-02-08 RX ADMIN — LISINOPRIL 2.5 MG: 2.5 TABLET ORAL at 09:49

## 2018-02-08 RX ADMIN — SODIUM CHLORIDE: 9 INJECTION, SOLUTION INTRAVENOUS at 23:07

## 2018-02-08 RX ADMIN — ASPIRIN 81 MG: 81 TABLET, COATED ORAL at 09:50

## 2018-02-08 RX ADMIN — GLIPIZIDE 10 MG: 10 TABLET ORAL at 17:08

## 2018-02-08 RX ADMIN — TAMSULOSIN HYDROCHLORIDE 0.4 MG: 0.4 CAPSULE ORAL at 09:49

## 2018-02-08 RX ADMIN — IPRATROPIUM BROMIDE 2 SPRAY: 21 SPRAY NASAL at 09:47

## 2018-02-08 RX ADMIN — CLOPIDOGREL 75 MG: 75 TABLET, FILM COATED ORAL at 09:50

## 2018-02-08 RX ADMIN — IOPAMIDOL 81 ML: 755 INJECTION, SOLUTION INTRAVENOUS at 01:48

## 2018-02-08 RX ADMIN — CEFTRIAXONE 1 G: 10 INJECTION, POWDER, FOR SOLUTION INTRAVENOUS at 03:02

## 2018-02-08 RX ADMIN — METFORMIN HYDROCHLORIDE 1000 MG: 500 TABLET, FILM COATED ORAL at 18:09

## 2018-02-08 RX ADMIN — EXENATIDE 5 MCG: 250 INJECTION SUBCUTANEOUS at 09:59

## 2018-02-08 RX ADMIN — Medication 12.5 MG: at 09:50

## 2018-02-08 RX ADMIN — SODIUM CHLORIDE: 9 INJECTION, SOLUTION INTRAVENOUS at 12:56

## 2018-02-08 RX ADMIN — SODIUM CHLORIDE 65 ML: 9 INJECTION, SOLUTION INTRAVENOUS at 01:48

## 2018-02-08 RX ADMIN — DULOXETINE HYDROCHLORIDE 30 MG: 30 CAPSULE, DELAYED RELEASE ORAL at 09:50

## 2018-02-08 RX ADMIN — OMEPRAZOLE 40 MG: 20 CAPSULE, DELAYED RELEASE ORAL at 09:49

## 2018-02-08 RX ADMIN — METFORMIN HYDROCHLORIDE 1000 MG: 500 TABLET, FILM COATED ORAL at 09:49

## 2018-02-08 RX ADMIN — SODIUM CHLORIDE: 9 INJECTION, SOLUTION INTRAVENOUS at 05:30

## 2018-02-08 RX ADMIN — SODIUM CHLORIDE 1000 ML: 9 INJECTION, SOLUTION INTRAVENOUS at 01:09

## 2018-02-08 RX ADMIN — ATORVASTATIN CALCIUM 80 MG: 40 TABLET, FILM COATED ORAL at 09:49

## 2018-02-08 RX ADMIN — EXENATIDE 5 MCG: 250 INJECTION SUBCUTANEOUS at 17:26

## 2018-02-08 RX ADMIN — GLIPIZIDE 10 MG: 10 TABLET ORAL at 06:49

## 2018-02-08 NOTE — PROGRESS NOTES
"HPI      Chief Complaint   Patient presents with     Abdominal Pain     weakness, experiencing continuous pain lower right quadrant. Started on his birthday. On and off but mostly on ever since       1 week ago had the stomach flu where he was vomiting and diarrhea for about 24 hours   The episode started with pain of the right abdomen   The pain is continuing now for 1 week and has been constant 5-6/10   Bowels are still loose   He also \"Can't get his strength back\"   2 days ago his legs gave out on him a few times where his SO had to help him up   He has had frequent falls for a long time which is worse now since this illness   No appetite   No fevers  Has had a couple dizzy spells in the last week that isn't new for him   No CP, SOB   No new urinary changes. Not dark        Past Medical History:   Diagnosis Date     Aortic root dilatation (H)      Aortic stenosis      Benign essential hypertension 1/6/2017     Benign non-nodular prostatic hyperplasia with lower urinary tract symptoms 10/20/2016     CAD (coronary artery disease)     MI 1970     Cardiomyopathy (H)      Carotid artery stenosis 10/20/2016    chronically occluded left internal carotid artery     Carotid occlusion, left      Coronary artery disease involving native coronary artery of native heart without angina pectoris 10/20/2016     Diabetes mellitus (H)      DJD (degenerative joint disease)      Gastro-oesophageal reflux disease      Gastroesophageal reflux disease without esophagitis 10/20/2016     Heart attack      Hyperlipidemia      Hypertension      Mild cognitive impairment 10/20/2016     Nephrolithiasis     RECURRENT     Osteopenia      PAD (peripheral artery disease) (H)     peripheral angiogram 5/2017: The common iliac artery stenoses were stented and the superficial femoral artery stenoses were angioplastied     Paroxysmal atrial fibrillation (H)      PONV (postoperative nausea and vomiting)      RBBB with left anterior fascicular block  "     Unspecified cerebral artery occlusion with cerebral infarction      Past Surgical History:   Procedure Laterality Date     AMPUTATE FOOT Left 6/4/2017    Procedure: AMPUTATE FOOT;  Sun Add#3: partial left foot amputation - Sagital Saw - Mini C-Arm;  Surgeon: Moe Kirk DPM;  Location:  OR     CHOLECYSTECTOMY       ENDARTERECTOMY CAROTID Right 1/3/2018    Procedure: ENDARTERECTOMY CAROTID;  RIGHT CAROTID ENDARTERECTOMY WITH EEG;  Surgeon: Roland Rodriguez MD;  Location:  OR     ESOPHAGOSCOPY, GASTROSCOPY, DUODENOSCOPY (EGD), COMBINED N/A 12/26/2016    Procedure: COMBINED ESOPHAGOSCOPY, GASTROSCOPY, DUODENOSCOPY (EGD);  Surgeon: Nasim Louis MD;  Location:  GI     GENITOURINARY SURGERY      KIDNEY STONE REMOVAL X 4     HC UGI ENDOSCOPY W EUS N/A 12/29/2016    Procedure: COMBINED ENDOSCOPIC ULTRASOUND, ESOPHAGOSCOPY, GASTROSCOPY, DUODENOSCOPY (EGD);  Surgeon: Nasim Louis MD;  Location:  GI     HERNIA REPAIR       INCISION AND DRAINAGE FOOT, COMBINED Left 6/6/2017    Procedure: COMBINED INCISION AND DRAINAGE FOOT;  REVISIONAL LEFT FOOT INCISION AND DRAINAGE WITH POSSIBLE FLAP CLOSURE , ANTIBIOTIC SOLUTION ;  Surgeon: Nabil Ann DPM;  Location:  OR     LASER HOLMIUM LITHOTRIPSY URETER(S), INSERT STENT, COMBINED  3/2/2012    Procedure:COMBINED CYSTOSCOPY, URETEROSCOPY, LASER HOLMIUM LITHOTRIPSY URETER(S), INSERT STENT; CYSTOSCOPY, RIGHT RETROGRADES, RIGHT URETEROSCOPY, HOLMIUM LASER, RIGHT STENT PLACEMENT; Surgeon:ANJELICA LEÓN; Location: OR     ROTATOR CUFF REPAIR RT/LT       Social History   Substance Use Topics     Smoking status: Former Smoker     Packs/day: 1.00     Years: 30.00     Types: Cigarettes     Quit date: 1/1/1999     Smokeless tobacco: Never Used     Alcohol use 4.2 oz/week     7 Standard drinks or equivalent per week      Comment: 1 drink per week     Current Outpatient Prescriptions   Medication Sig Dispense Refill     exenatide  (BYETTA) 5 mcg/0.02mL per dose (60 doses per 1.2 mL prefilled pen) Inject 5 mcg Subcutaneous 2 times daily (before meals) 1 Syringe 0     insulin pen needle (BD JOE U/F) 32G X 4 MM Use 4  times daily or as directed. 360 each 11     MAGNESIUM OXIDE PO Take 400 mg by mouth 2 times daily       ACETAMINOPHEN PO Take 650 mg by mouth as needed for pain Give 1 tablet by  mouth as needed  for pain not to  exceed 3000mg per  day.       polyethylene glycol (MIRALAX/GLYCOLAX) Packet Take 17 g by mouth daily as needed for constipation       cholecalciferol (VITAMIN D3) 04065 UNITS capsule Take 1 capsule (50,000 Units) by mouth once a week 12 capsule 0     LISINOPRIL PO Take 2.5 mg by mouth daily        metoprolol (TOPROL-XL) 25 MG 24 hr tablet Take 0.5 tablets (12.5 mg) by mouth daily       glipiZIDE (GLUCOTROL) 10 MG tablet Take 1 tablet (10 mg) by mouth 2 times daily (before meals) 180 tablet 3     AMITRIPTYLINE HCL PO Take 25 mg by mouth nightly as needed for sleep       ipratropium (ATROVENT) 0.03 % spray Spray 2 sprays into both nostrils every 12 hours       DULoxetine (CYMBALTA) 30 MG EC capsule Take 1 capsule (30 mg) by mouth daily 90 capsule 3     omeprazole (PRILOSEC) 40 MG capsule Take 1 capsule (40 mg) by mouth daily Take 30-60 minutes before a meal. 90 capsule 3     tamsulosin (FLOMAX) 0.4 MG capsule Take 1 capsule (0.4 mg) by mouth daily 90 capsule 3     aspirin EC 81 MG EC tablet Take 1 tablet (81 mg) by mouth daily 30 tablet 0     METFORMIN HCL PO Take 1,000 mg by mouth 2 times daily (with meals)       ATORVASTATIN CALCIUM PO Take 80 mg by mouth daily       blood glucose monitoring (NO BRAND SPECIFIED) test strip Use to test blood sugar three times daily or as directed. 300 each 3     order for DME Equipment being ordered: True Matrix Blood Glucose meter. 1 Device 0     Clopidogrel Bisulfate, [id number], (PLAVIX PO) Take 75 mg by mouth daily        Allergies   Allergen Reactions     Oxycodone Other (See  "Comments)     \"TERRIBLE SWEATING\"     Sulfa Drugs      Tetracycline        Reviewed and updated as needed this visit by clinical staff and provider      ROS  Detailed as above       /85  Pulse 99  Temp 97.5  F (36.4  C) (Oral)  Resp 16  SpO2 98%    Physical Exam   Constitutional: He is well-developed, well-nourished, and in no distress.   Sitting in wheelchair   HENT:   Head: Normocephalic.   Eyes: Conjunctivae are normal.   Cardiovascular: Regular rhythm and normal heart sounds.    Borderline tachy   Pulmonary/Chest: Effort normal and breath sounds normal.   Abdominal: Soft. Bowel sounds are normal. He exhibits no distension. There is tenderness (right). There is no guarding.   Musculoskeletal: Normal range of motion.   Neurological: He is alert.   Skin: Skin is warm and dry.   Psychiatric: Mood and affect normal.   Vitals reviewed.      Assessment and Plan:       ICD-10-CM    1. Abdominal pain, right lateral R10.9    2. Nausea R11.0    3. Weakness generalized R53.1        Constant right-sided abdominal pain for 1 week. Shortly after the pain started he had vomiting and diarrhea, which has since resolved. The pain persists and he continues to be nauseous. He has also become very weak and has fallen several times. He does have a long history of frequent falls, but this is worse. I'm very familiar with the patient as I see him in my usual practice, and I have never seen him in a wheelchair like he is today.   Considering this constant abdominal pain and nausea with worsening weakness, I will send patient to the ED for a more thorough evaluation. Patient and his significant other agreed to this plan      ASPEN Baptiste, CNP  [facility]     "

## 2018-02-08 NOTE — NURSING NOTE
"Chief Complaint   Patient presents with     Abdominal Pain     weakness, experiencing continuous pain lower right quadrant. Started on his birthday. On and off but mostly on ever since       Initial /85  Pulse 99  Temp 97.5  F (36.4  C) (Oral)  Resp 16  SpO2 98% Estimated body mass index is 27.21 kg/(m^2) as calculated from the following:    Height as of 1/11/18: 5' 6\" (1.676 m).    Weight as of 1/11/18: 168 lb 9.6 oz (76.5 kg).  Medication Reconciliation: complete     Patrick Anderson CMA    "

## 2018-02-08 NOTE — PLAN OF CARE
Problem: Patient Care Overview  Goal: Plan of Care/Patient Progress Review  PT: PT orders received, evaluation completed, treatment initiated. Pt is a 89 yo male with PMHx significant for MI, CAD, DM, aortic stenosis, PAD, stroke, and a-fib who presents to the ED for abdominal pain and generalized weakness. At baseline, pt lives with significant other, Halina, in a [facility] where he uses an SEC for all mobility. Reports extensive fall history, ~6 in 6 months. Pt owns a FWW and has GBs in bathroom. Pt has 3 stairs to enter and 7 inside to the second floor where all needs can be met. Was satisfied with prior TCU stay in [place] and is willing to go again if necessary.    Discharge Planner PT   Patient plan for discharge: Open to TCU, prefers TCU to HHPT  Current status: Pt is CGA for all transfers and for gait with FWW, with cueing for safety for hand placement and use of FWW for all mobility. Demo good static and dynamic standing balance with intermittent UE support at GB and CGA in bathroom during voiding and with pericares. Good activity tolerance evidenced by no SOB with 150ft gait and VSS. One instance of ModA to prevent LOB/fall when leaning over to  phone without UE support. SBA for sit>supine, able to demo bridge and boost with cueing for repositioning. Pt left supine with alarms on, needs within reach. Pt encouraged to call with OOB needs and to walk with nursing.  Barriers to return to prior living situation: current level of assist, impaired balance without UE support  Recommendations for discharge: TCU with use of FWW for all mobility  Rationale for recommendations: Pt would continue to benefit from skilled PT services for improved balance and safety with mobility using FWW in order to progress independence with functional mobility and return to PLOF.       Entered by: Cara Schmidt 02/08/2018 1:17 PM

## 2018-02-08 NOTE — PLAN OF CARE
Problem: Patient Care Overview  Goal: Plan of Care/Patient Progress Review  OT: Eval completed and treatment initiated. Pt lives in a townhouse with his SO who works during the day and reports I with ADLs and uses a SEC for functional mobility, SO does manage pt's meds, provides transportation and does all other IADLs, pt does have macular degeneration. Pt diagnosed with UTI and recently hospitalized and at TCU.   Discharge Planner OT   Patient plan for discharge: home  Current status: pt requires MARGO for supine to sit and CGA for sit <> stand and ambulate to BR with ww and cues for ww use. Pt stood at sink for toileting with SBA and stood at sink for oral facial hygiene with SBA/CGA for safety, cues throughout for ww use/safety. Pt alert and oriented x4. Requires CGA for LE ADl's.  Barriers to return to prior living situation: home alone for part of day, has macular degeneration, decreased balance, recent falls at home.   Recommendations for discharge: TCU, however, pt declines, if home recommend home OT for home safety eval and ADl's and PT for balance.   Rationale for recommendations: daily therapy to increase ADL and functional mobility independence and safety to PLOF and if home see above for home OT and PT.        Entered by: Jacque Kim 02/08/2018 8:56 AM

## 2018-02-08 NOTE — H&P
Admitted:     02/07/2018      PRIMARY CARE PHYSICIAN:  Dr. Matt Morrissey.        PRIMARY CARE DOCTOR:  Dr. Rafa Samuel.      CHIEF COMPLAINT:  Abdominal pain, generalized weakness.      HISTORY OF PRESENT ILLNESS:  Dustin Pearce is a very young appearing 89-year-old  gentleman who lives with his significant other with known coronary disease, diabetes, aortic stenosis, peripheral arterial disease, stroke, atrial fibrillation for which he is on Plavix, who presents to [facility] for the last week with weakness and abdominal pain.  The patient for the last week is having lower abdominal pain, right greater than left.  He had episodes of nausea and several episodes of vomiting and diarrhea that was nonbloody.  The patient has also had multiple falls, now with his legs giving out and became so weak he initially went to Urgent Care to seek care.  He was subsequently transferred to [facility] for further assessment.      In the emergency department, the patient was seen by Dr. Chad Trierweiler.  The patient was afebrile.  Vital signs were stable.  Blood work revealed normal electrolytes, normal kidney function, normal LFTs, normal lipase.  Troponin was below reference at 0.036, glucose was 179.  CBC was normal.  Urinalysis shows specific gravity of 1.020 with a pH of 6.5, small blood, large leukocyte esterase, greater than 182 white cells and 119 red cells, many bacteria and WBC clumps.  The patient also had a CT of the abdomen and pelvis due to his low back pain.  This showed no acute abnormality, no bowel obstruction or inflammation, colonic diverticula without evidence of acute diverticulitis.  There is bilateral intrarenal stones but no ureteral stones or hydronephrosis.  The patient subsequently was given IV fluids and ceftriaxone and is being admitted for UTI and multiple falls.      PAST MEDICAL HISTORY:   1.  Paroxysmal atrial fibrillation.     2.  Hypertension.   3.   Hyperlipidemia.   4.  History of ASCVD with history of acute myocardial infarction in the 1970s.   5.  GERD.     6.  DJD.   7.  Type 2 diabetes with peripheral neuropathy, most recent A1c was 8.9.     8.  History of cardiomyopathy with EF of 35-40%.     9.  History of BPH.     10.  History of aortic root dilatation.     11.  History of depression.     12.  History of chronically occluded carotid artery.   13.  History of diabetic infected ulcer and abscess, status post left partial fifth ray amputation in 06/2017.     14.  History of peripheral arterial disease, status post left MEG stenting and left SFA angioplasty.     15.  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   16.  History of mild cognitive impairment.     17.  History of hearing loss.      ALLERGIES:  OXYCODONE, SULFA, TETRACYCLINE.      PAST SURGICAL HISTORY:     1.  Left toe amputation.     2.  Cholecystectomy, laser lithotripsy.     3.  Hernia repair.   4.  History of I and D of the foot.     5.  Left rotator cuff repair.      FAMILY HISTORY:  Maternal history of breast cancer and paternal history of heart failure.      SOCIAL HISTORY:  , accompanied by his significant other.  Former smoker, quit in 1999, retired .  He is FULL CODE.      CURRENT MEDICATIONS:   1.  Amitriptyline 25 mg once at bedtime.   2.  Aspirin 81 mg once a day.   3.  Atorvastatin 40 mg once a day.   4.  Vitamin D 50,000 units once a week.   5.  Plavix 75 mg once a day.   6.  Cymbalta 30 mg daily.   7.  Byetta 5 mcg twice a day.   8.  Glipizide 10 mg b.i.d.   9.  Atrovent 0.03% spray 2 sprays both nares twice a day.     10.  Lisinopril 2.5 mg once daily.   11.  Magnesium oxide 400 mg b.i.d.   12.  Metformin 1000 mg twice a day.   13.  Toprol-XL 12.5 mg daily.   14.  Omeprazole 40 mg once a day.   15.  MiraLax 17 grams daily as needed for constipation.     16.  Flomax 0.4 mg daily.      REVIEW OF SYSTEMS:  A 10-point systems  reviewed and as dictated in history of present illness.      PHYSICAL EXAMINATION:   VITAL SIGNS:  Temperature 97.9, heart rate 68, respirations 18, blood pressure 127/65, saturations 98% on room air.   GENERAL:  The patient is a 89-year-old  gentleman who appears younger than his stated age.  He is pleasant, cooperative, in no distress.   HEENT:  Unremarkable.  Pupils equal.  Sclerae are anicteric.  Mucous membranes are moist.   NECK:  Veins are not distended.   LUNGS:  Clear to auscultation.   CARDIOVASCULAR:  S1, S2, regular rate and rhythm.  No murmurs, gallops, rubs.   ABDOMEN:  He does have some discomfort at the right lower quadrant.  There is no guarding or rebound.  Bowel sounds are normoactive.   EXTREMITIES:  No edema.   NEUROLOGIC:  Grossly nonfocal.  Cranial nerves grossly intact.      LABORATORY DATA:  As dictated in the history of present illness.      ASSESSMENT:  Dustin Pearce is a 89-year-old with abdominal pain and diarrhea for the last week, weakness, multiple falls, finding of acute urinary tract infection being admitted for further treatment.      PLAN:   1.  Urinary tract infection with multiple falls.  The patient had both blood and urine cultures obtained.  Imaging studies did not show any complicated urinary tract infection such as ureteral stone or hydronephrosis.  The patient will continue on ceftriaxone.  We will monitor his blood and urine cultures.   2.  Diabetes.  The patient's blood sugars are slightly elevated.  His most recent A1c is 8.6.  We will place the patient on a moderate carbohydrate diet and do Accu-Cheks 4 times daily and cover with sliding scale insulin.  Will also continue the patient on his Byetta and Glucotrol and metformin.   3.  History of benign prostatic hypertrophy.  Will continue the patient on Flomax.   4.  ASCVD and peripheral arterial disease.  We will continue the patient on Plavix, Lipitor, lisinopril and metoprolol.  His blood pressure is under  reasonably good control.   5.  Depression.  We will continue the patient on Cymbalta.   6.  Hyperlipidemia.  We will continue the patient on Lipitor.   7.  Reflux disease.  We will continue the patient on omeprazole.     8.  Deep venous thrombosis prophylaxis, patient with compression boots      CODE STATUS:  FULL.         JACK JIN MD             D: 2018   T: 2018   MT: GOPI      Name:     SID AGUIAR   MRN:      [id number]        Account:      [id number]   :      1928        Admitted:     2018                   Document: [id number]       cc: Matt Morrissey MD

## 2018-02-08 NOTE — PROGRESS NOTES
02/08/18 0810   Quick Adds   Type of Visit Initial Occupational Therapy Evaluation   Living Environment   Lives With significant other   Living Arrangements house  ([facility])   Home Accessibility stairs to enter home;tub/shower is not walk in   Number of Stairs to Enter Home 2   Number of Stairs Within Home 7   Transportation Available family or friend will provide   Self-Care   Dominant Hand right   Usual Activity Tolerance good   Current Activity Tolerance moderate   Regular Exercise yes   Equipment Currently Used at Home cane, straight;grab bar   Functional Level Prior   Ambulation 1-->assistive equipment   Transferring 1-->assistive equipment   Toileting 0-->independent   Bathing 1-->assistive equipment   Dressing 0-->independent   Eating 0-->independent   Communication 0-->understands/communicates without difficulty   Swallowing 0-->swallows foods/liquids without difficulty   Cognition 0 - no cognition issues reported   Fall history within last six months yes   Number of times patient has fallen within last six months 6   Prior Functional Level Comment Pt does not drive SO drives and manges own meds and does the cookign, cleaning and laundry etc.    General Information   Onset of Illness/Injury or Date of Surgery - Date 02/07/18   Referring Physician Blaze Martinez MD   Patient/Family Goals Statement Return home   Additional Occupational Profile Info/Pertinent History of Current Problem Dustin Pearce is a 89 year old male with a history of MI, CAD, DM, aortic stenosis, PAD, stroke, and atrial fibrillation currently anticoagulated on Plavix for who presents to the emergency department today for evaluation of abdominal pain and generalized weakness. diagnosed with UTI   Precautions/Limitations fall precautions   Cognitive Status Examination   Orientation orientation to person, place and time   Level of Consciousness alert   Able to Follow Commands WNL/WFL   Personal Safety (Cognitive) moderate impairment    Cognitive Comment Will continue to monitor and screen cognition   Visual Perception   Visual Perception Wears glasses  (has macular degeneration)   Visual Perception Comments has difficulty seeing details and unable to see/read small print, can see clock on the wall correct time.    Sensory Examination   Sensory Quick Adds No deficits were identified   Pain Assessment   Patient Currently in Pain No   Range of Motion (ROM)   ROM Comment B UE AROM WFL   Strength   Strength Comments B UE MMT 4-/5   Bed Mobility Skill: Rolling/Turning   Level of [place] - Bed Mobility Skill Rolling Turning contact guard   Bed Mobility Skill: Scooting/Bridging   Level of [place]: Scooting/Bridging contact guard   Bed Mobility Skill: Supine to Sit   Level of [place]: Supine/Sit minimum assist (75% patients effort)   Assistive Device: Supine/Sit bedrail   Transfer Skill: Bed to Chair/Chair to Bed   Level of [place]: Bed to Chair contact guard   Assistive Device - Transfer Skill Bed to Chair Chair to Bed Rehab Eval standard walker   Transfer Skill: Sit to Stand   Level of [place]: Sit/Stand contact guard   Assistive Device for Transfer: Sit/Stand standard walker   Transfer Skill: Toilet Transfer   Level of [place]: Toilet (SBA standing for toileting)   Lower Body Dressing   Level of [place]: Dress Lower Body contact guard   Toileting   Level of [place]: Toilet contact guard   Grooming   Level of [place]: Grooming contact guard   Eating/Self Feeding   Level of [place]: Eating independent   Instrumental Activities of Daily Living (IADL)   Previous Responsibilities (all iADL's provided by SO)   Activities of Daily Living Analysis   Impairments Contributing to Impaired Activities of Daily Living balance impaired;cognition impaired;strength decreased  (decreased activity tolerance)   General Therapy Interventions   Planned Therapy Interventions ADL retraining;transfer training;cognition;home  "program guidelines;progressive activity/exercise   Clinical Impression   Criteria for Skilled Therapeutic Interventions Met yes, treatment indicated   OT Diagnosis decrease ADL and functional mobility   Influenced by the following impairments impaired ADL and functional mobility   Assessment of Occupational Performance 3-5 Performance Deficits   Identified Performance Deficits impaired I with dressing, toilet and shower transfer.   Clinical Decision Making (Complexity) Moderate complexity   Therapy Frequency daily   Predicted Duration of Therapy Intervention (days/wks) 3 days   Anticipated Discharge Disposition Transitional Care Facility;Home with Assist;Home with Home Therapy   Risks and Benefits of Treatment have been explained. Yes   Patient, Family & other staff in agreement with plan of care Yes   [facility] AM-PAC  \"6 Clicks\" Daily Activity Inpatient Short Form   1. Putting on and taking off regular lower body clothing? 3 - A Little   2. Bathing (including washing, rinsing, drying)? 3 - A Little   3. Toileting, which includes using toilet, bedpan or urinal? 3 - A Little   4. Putting on and taking off regular upper body clothing? 4 - None   5. Taking care of personal grooming such as brushing teeth? 3 - A Little   6. Eating meals? 4 - None   Daily Activity Raw Score (Score out of 24.Lower scores equate to lower levels of function) 20   Total Evaluation Time   Total Evaluation Time (Minutes) 10     "

## 2018-02-08 NOTE — PLAN OF CARE
Problem: Patient Care Overview  Goal: Plan of Care/Patient Progress Review  Outcome: No Change  Patient A&Ox4, forgetful.  Up with SBA/1 assistance with gait belt/walker.  Vitals stable.  Denies pain, denies nausea.   and 177.  Tolerating mod cho diet.  On IVF's, IV Rocephin.  Urine culture pending.  PT/OT following.  Continue to monitor.

## 2018-02-08 NOTE — CONSULTS
"BRIEF NUTRITION ASSESSMENT      REASON FOR ASSESSMENT:  Admission screen - Unintentional weight loss of 10# or more in past 2 months      NUTRITION HISTORY:  Pt states he follows a regular diet at home, does not count carbs.  He has had diet education in the past. He doesn't care for sweets or much bread.    He reports decreased intake, <50% of his usual, for the last week due to abdominal pain.  He'd also had some N/V and diarrhea but that has resolved.  Pt states that during this time he ate \"a lot\" of oatmeal and V-8 juice; he also ate soup.  He didn't eat regular meals, just grazed on these foods.    CURRENT DIET AND INTAKE:  Diet:  Mod consistent carb.  He states he's eating better now than previously.  He ate ~50% of lunch: meat loaf, mashed potatoes/gravy, corn, ashish food cake with strawberries and whipped topping.                ANTHROPOMETRICS:  Height: 5' 5\"  Weight: 69.2 kg  BMI: 25.38 kg/m2  IBW:  61.8 kg +/- 10%  Weight Status: Overweight BMI 25-29.9  %IBW: 112%  Weight History: Pt states he feels like he's lost a few pounds but he questions today's wt of 152#.  It appears his usual wt is ~168#.  Wt Readings from Last 10 Encounters:   02/08/18 69.2 kg (152 lb 8 oz)   01/11/18 76.5 kg (168 lb 9.6 oz)   01/11/18 76.5 kg (168 lb 9.6 oz)   01/05/18 74.4 kg (164 lb)   01/04/18 74.4 kg (164 lb 0.4 oz)   12/14/17 74.6 kg (164 lb 6.4 oz)   12/01/17 75 kg (165 lb 6.4 oz)   11/03/17 76.7 kg (169 lb)   10/26/17 76.2 kg (168 lb)   10/26/17 76.2 kg (168 lb)       LABS:  Alb 4.0    MALNUTRITION:  Patient does not meet two of the following criteria necessary for diagnosing malnutrition: significant weight loss, reduced intake, subcutaneous fat loss, muscle loss or fluid retention    NUTRITION INTERVENTION:  Nutrition Diagnosis:  No nutrition diagnosis at this time.    Implementation:  Nutrition Education:  Per Provider order if indicated  Will send Glucerna between meals until intake returns to normal.    FOLLOW " UP/MONITORING:   Will re-evaluate in 7 - 10 days, or sooner, if re-consulted.    Chandrika Singh, RD  Pager [phone] (M-F)            [phone] ([facility])

## 2018-02-08 NOTE — PROGRESS NOTES
" 02/08/18 0915   Quick Adds   Type of Visit Initial PT Evaluation   Living Environment   Lives With significant other   Living Arrangements other (see comments)  ([facility])   Home Accessibility bed and bath are not on the first floor;grab bars present (bathtub);grab bars present (toilet);stairs to enter home;stairs within home;stairs (2 railings present);tub/shower is not walk in   Number of Stairs to Enter Home 3   Number of Stairs Within Home 7   Stair Railings at Home outside, present at both sides;inside, present on right side   Transportation Available family or friend will provide  (signficant other drives)   Living Environment Comment Pt depends on significant other for driving   Self-Care   Dominant Hand right   Usual Activity Tolerance good   Current Activity Tolerance moderate   Regular Exercise yes   Activity/Exercise Type strength training   Exercise Amount/Frequency other (see comments)  (during stay at nursing home)   Equipment Currently Used at Home grab bar   Activity/Exercise/Self-Care Comment Primary source of exercise is when attending therapies, otherwise enjoys walking. Pt owns FWW, however is only using SEC currently for all mobility   Functional Level Prior   Ambulation 1-->assistive equipment   Transferring 1-->assistive equipment   Toileting 0-->independent   Bathing 0-->independent   Dressing 0-->independent   Eating 0-->independent   Communication 0-->understands/communicates without difficulty   Swallowing 0-->swallows foods/liquids without difficulty   Cognition 0 - no cognition issues reported   Fall history within last six months yes   Number of times patient has fallen within last six months 6  (\"about 6 times\" - 3 with cane, 3 w/o)   Which of the above functional risks had a recent onset or change? ambulation;transferring   Prior Functional Level Comment Extensive fall history with and without AD. Enjoyed last TCU stay near home in [place] that pt is open to attending again to " improve strength and balance   General Information   Onset of Illness/Injury or Date of Surgery - Date 02/07/18   Referring Physician Blaze Martinez MD   Patient/Family Goals Statement To go home, however open to TCU   Pertinent History of Current Problem (include personal factors and/or comorbidities that impact the POC) Pt is a 89 yo male with PMHx significant for MI, CAD, DM, aortic stenosis, PAD, stroke, and a-fib who presents to the ED for abdominal pain and generalized weakness.   Precautions/Limitations fall precautions   General Observations Pt seated in chair eating breakfast with R IV and VSS on RA upon PT arrival.   General Info Comments Activity: Up with assist   Cognitive Status Examination   Orientation orientation to person, place and time   Level of Consciousness alert   Follows Commands and Answers Questions 75% of the time;able to follow multistep instructions   Personal Safety and Judgment at risk behaviors demonstrated   Pain Assessment   Patient Currently in Pain No   Integumentary/Edema   Integumentary/Edema no deficits were identifed   Posture    Posture Protracted shoulders   Range of Motion (ROM)   ROM Comment BLE WNL for all AROM   Strength   Strength Comments 4/5 globally with exception of 4-/5 for B hip flexion   Bed Mobility   Bed Mobility Comments SBA for sit>supine and for bridging and boosting with vc   Transfer Skills   Transfer Comments CGA for sit>stand with cueing for hand placement, poor carryover   Gait   Gait Comments CGA for 10ft with FWW, no overt LOB howver observed NBOS   Balance   Balance Comments Moderate static standing balance w/o UE support, good static and dynamic standing balance with UE support   Sensory Examination   Sensory Perception no deficits were identified   Coordination   Coordination no deficits were identified   Muscle Tone   Muscle Tone no deficits were identified   General Therapy Interventions   Planned Therapy Interventions balance training;gait  "training;manual therapy;neuromuscular re-education;strengthening;stretching;transfer training;home program guidelines;progressive activity/exercise   Clinical Impression   Criteria for Skilled Therapeutic Intervention yes, treatment indicated   PT Diagnosis impaired gait   Influenced by the following impairments impaired balance   Functional limitations due to impairments transfers, gait, stairs   Clinical Presentation Stable/Uncomplicated   Clinical Presentation Rationale 2-3 factors   Clinical Decision Making (Complexity) Low complexity   Therapy Frequency` daily   Predicted Duration of Therapy Intervention (days/wks) 3 days   Anticipated Discharge Disposition Transitional Care Facility   Risk & Benefits of therapy have been explained Yes   Patient, Family & other staff in agreement with plan of care Yes   [facility] TM \"6 Clicks\"   2016, Trustees of [facility], under license to [employer].  All rights reserved.   6 Clicks Short Forms Basic Mobility Inpatient Short Form   [facility]  \"6 Clicks\" V.2 Basic Mobility Inpatient Short Form   1. Turning from your back to your side while in a flat bed without using bedrails? 3 - A Little   2. Moving from lying on your back to sitting on the side of a flat bed without using bedrails? 3 - A Little   3. Moving to and from a bed to a chair (including a wheelchair)? 3 - A Little   4. Standing up from a chair using your arms (e.g., wheelchair, or bedside chair)? 3 - A Little   5. To walk in hospital room? 3 - A Little   6. Climbing 3-5 steps with a railing? 2 - A Lot   Basic Mobility Raw Score (Score out of 24.Lower scores equate to lower levels of function) 17   Total Evaluation Time   Total Evaluation Time (Minutes) 10     "

## 2018-02-08 NOTE — PHARMACY-ADMISSION MEDICATION HISTORY
Admission medication history interview status for the 2/7/2018  admission is complete. See EPIC admission navigator for prior to admission medications     Medication history source reliability:Good    Actions taken by pharmacist (provider contacted, etc):None     Additional medication history information not noted on PTA med list :Atorvastatin dose reduced to 40mg by Dr. Rodriguez     Medication reconciliation/reorder completed by provider prior to medication history? Yes    Time spent in this activity: 20 min    Prior to Admission medications    Medication Sig Last Dose Taking? Auth Provider   OMEPRAZOLE PO Take 40 mg by mouth daily as needed Past Week at Unknown time Yes Unknown, Entered By History   exenatide (BYETTA) 5 mcg/0.02mL per dose (60 doses per 1.2 mL prefilled pen) Inject 5 mcg Subcutaneous 2 times daily (before meals) 2/7/2018 at Unknown time Yes Matt Morrissey MD   MAGNESIUM OXIDE PO Take 400 mg by mouth 2 times daily 2/7/2018 at Unknown time Yes Reported, Patient   polyethylene glycol (MIRALAX/GLYCOLAX) Packet Take 17 g by mouth daily as needed for constipation Past Week at Unknown time Yes Reported, Patient   cholecalciferol (VITAMIN D3) 53644 UNITS capsule Take 1 capsule (50,000 Units) by mouth once a week  Patient taking differently: Take 1 capsule by mouth once a week Sundays Past Week at Unknown time Yes Gagan Jerome MD   LISINOPRIL PO Take 2.5 mg by mouth daily  2/7/2018 at Unknown time Yes Reported, Patient   metoprolol (TOPROL-XL) 25 MG 24 hr tablet Take 0.5 tablets (12.5 mg) by mouth daily 2/7/2018 at am Yes Chrissy Padgett PA-C   glipiZIDE (GLUCOTROL) 10 MG tablet Take 1 tablet (10 mg) by mouth 2 times daily (before meals) 2/7/2018 at Unknown time Yes Matt Morrissey MD   AMITRIPTYLINE HCL PO Take 25 mg by mouth nightly as needed for sleep Past Week at Unknown time Yes Unknown, Entered By History   ipratropium (ATROVENT) 0.03 % spray Spray 2 sprays into both nostrils 2 times  daily as needed  2/7/2018 at Unknown time Yes Unknown, Entered By History   DULoxetine (CYMBALTA) 30 MG EC capsule Take 1 capsule (30 mg) by mouth daily 2/7/2018 at am Yes Matt Morrissey MD   tamsulosin (FLOMAX) 0.4 MG capsule Take 1 capsule (0.4 mg) by mouth daily 2/7/2018 at pm  Yes Matt Morrissey MD   aspirin EC 81 MG EC tablet Take 1 tablet (81 mg) by mouth daily 2/7/2018 at am Yes Tra Reynoso MD   METFORMIN HCL PO Take 1,000 mg by mouth 2 times daily (with meals) 2/7/2018 at Unknown time Yes Reported, Patient   Clopidogrel Bisulfate, [id number], (PLAVIX PO) Take 75 mg by mouth daily  2/7/2018 at am Yes Reported, Patient   insulin pen needle (BD JOE U/F) 32G X 4 MM Use 4  times daily or as directed.   Matt Morrissey MD   ATORVASTATIN CALCIUM PO Take 40 mg by mouth At Bedtime  2/6/2018  Reported, Patient   blood glucose monitoring (NO BRAND SPECIFIED) test strip Use to test blood sugar three times daily or as directed.   Matt Morrissey MD   order for DME Equipment being ordered: [employer] Blood Glucose meter.   Matt Morrissey MD

## 2018-02-08 NOTE — PLAN OF CARE
Problem: Patient Care Overview  Goal: Plan of Care/Patient Progress Review  Outcome: Improving  Patient admitted this shift with UIT. VSS. Denies chest pain and SOB. Patient has fallen about six times lately, will be a fall risk. Up with SBAx1. Skin intact. LS are clear. PT/OT to follow. Continue to monitor.

## 2018-02-08 NOTE — ED NOTES
"[facility]  ED Nurse Handoff Report    ED Chief complaint: Flu Symptoms (started last wednesday.He is currently having abdominal pain with nausea)      ED Diagnosis:   Final diagnoses:   Generalized muscle weakness   Abdominal pain, right lower quadrant   Urinary tract infection with hematuria, site unspecified       Code Status: Full Code    Allergies:   Allergies   Allergen Reactions     Oxycodone Other (See Comments)     \"TERRIBLE SWEATING\"     Sulfa Drugs      Tetracycline        Activity level - Baseline/Home:  Independent    Activity Level - Current:   Independent     Needed?: No    Isolation: No  Infection: Not Applicable    Bariatric?: No    Vital Signs:   Vitals:    02/08/18 0100 02/08/18 0115 02/08/18 0130 02/08/18 0200   BP: 127/67  138/62 148/71   Pulse:       Resp:       Temp:       TempSrc:       SpO2: 99% 100% 97% 98%   Weight:       Height:           Cardiac Rhythm: ,        Pain level: 0-10 Pain Scale: 5    Is this patient confused?: No    Patient Report: Initial Complaint: Abdominal Pain  Focused Assessment: Pt c/o RLQ abdominal Pain radiating to LLQ that has been going on for the last couple of days. Pt reports multiple emesis episodes at home but denies any diarrhea. Pt reports intermittent nausea in the ED.  Tests Performed: Labs; UA; CT Abdomen/Pelvis.  Abnormal Results: UA results suggest UTI.  Treatments provided: 1000 ML NS Bolus; 1 G Rocephin;    Family Comments: Significant other at bedside.    OBS brochure/video discussed/provided to patient: N/A    ED Medications:   Medications   0.9% sodium chloride BOLUS (0 mLs Intravenous Stopped 2/8/18 0109)     Followed by   0.9% sodium chloride infusion (1,000 mLs Intravenous New Bag 2/8/18 0109)   cefTRIAXone (ROCEPHIN) 1 g in 10 mL SWFI Premix Syringe (not administered)   iopamidol (ISOVUE-370) solution 81 mL (81 mLs Intravenous Given 2/8/18 0148)   Saline flush (65 mLs Intravenous Given 2/8/18 0148)       Drips " infusing?:  No      ED NURSE PHONE NUMBER: [phone]

## 2018-02-08 NOTE — PROGRESS NOTES
RECEIVING UNIT ED HANDOFF REVIEW    ED Nurse Handoff Report was reviewed by: Denise Schuster on February 8, 2018 at 3:40 AM

## 2018-02-08 NOTE — ED PROVIDER NOTES
"  History     Chief Complaint:  Abdominal Pain and Generalized Weakness    HPI   Dustin ALEJANDRA Pearce is a 89 year old male with a history of MI, CAD, DM, aortic stenosis, PAD, stroke, and atrial fibrillation currently anticoagulated on Plavix for who presents to the emergency department today for evaluation of abdominal pain and generalized weakness. The patient reports that he began having lower abdominal pain, right greater than left, approximately one week ago, with associated nausea and several episodes of vomiting and nonbloody diarrhea, which have since subsided. However, his pain has continued persistently since onset and is perhaps slightly worsened with having a bowel movement. No other significant exacerbating or alleviating symptoms. The patient has also been somewhat generally fatigued and weak with his ongoing symptoms and has had multiple near falls secondary to this weakness, explaining that his legs \"gave out.\" His ongoing symptoms prompted him to seek evaluation in urgent care this afternoon, at which time he was referred here to the ED.     The patient reports that he has not been taking PO well with these recent symptoms. He denies any recent fevers. He denies sustaining any other significant injuries as a result of his near falls and has not struck his head.    Allergies:  Oxycodone  Sulfa Drugs  Tetracycline    Medications:    Exenatide  Magnesium  Acetaminophen  Miralax/Glycolax  Cholecalciferol  Lisinopril  Metoprolol  Glipizide  Amitriptyline  Atrovent  Cymbalta  Flomax  Aspirin 81   Metformin  Atorvastatin  Clopidogrel    Past Medical History:    Aortic root dilatation    Aortic stenosis   Benign non-nodular prostatic hyperplasia with lower urinary tract symptoms   CAD (coronary artery disease)   Cardiomyopathy   Carotid artery stenosis   Carotid occlusion, left   Diabetes mellitus    DJD (degenerative joint disease)   Gastro-oesophageal reflux disease   Gastroesophageal reflux disease without " "esophagitis   MI  Hyperlipidemia   Hypertension   Mild cognitive impairment   Nephrolithiasis   Osteopenia   PAD (peripheral artery disease)    Paroxysmal atrial fibrillation   RBBB with left anterior fascicular block   Unspecified cerebral artery occlusion with cerebral infarction     Past Surgical History:    Amputate left foot  Cholecystectomy  Endarterectomy right carotid  Esophagoscopy, gastroscopy, duodenoscopy (egd), combined   Kidney stone removal x 4  UGI Endoscopy with EUS  Hernia repair  Incision and drainage left foot combined  Laser holmium lithotripsy ureters, insert stent, combined  Rotator cuff repair     Family History:    Mother: Breast Cancer  Father: Heart Failure    Social History:  The patient was accompanied to the ED by significant other.  Smoking status: Former Smoker   Packs/day: 1.00   Years: 30.00   Types: Cigarettes   Quit date: 1/1/1999  Smokeless tobacco: Never Used  Alcohol use 4.2 oz/week - 7 Standard drinks or equivalent per week   Comment: 1 drink per week  Marital Status:       Review of Systems   Constitutional: Positive for appetite change and fatigue. Negative for fever.   Gastrointestinal: Positive for abdominal pain, diarrhea (Resolved), nausea (Resolved) and vomiting (Resolved).   Musculoskeletal: Positive for gait problem.   All other systems reviewed and are negative.    Physical Exam     Patient Vitals for the past 24 hrs:   BP Temp Temp src Pulse Resp SpO2 Height Weight   02/08/18 0230 160/79 - - - - 97 % - -   02/08/18 0200 148/71 - - - - 98 % - -   02/08/18 0130 138/62 - - - - 97 % - -   02/08/18 0115 - - - - - 100 % - -   02/08/18 0100 127/67 - - - - 99 % - -   02/08/18 0045 - - - - - 96 % - -   02/08/18 0030 137/58 - - - - - - -   02/08/18 0015 - - - - - 98 % - -   02/07/18 2145 158/73 98.2  F (36.8  C) Oral 89 20 98 % 1.651 m (5' 5\") 72.6 kg (160 lb)     Physical Exam  Eye:  Pupils are equal, round, and reactive.  Extraocular movements intact.    ENT:  No " rhinorrhea.  Moist mucus membranes.  Normal tongue and tonsil.    Cardiac:  Regular rate and rhythm.  No murmurs, gallops, or rubs.    Pulmonary:  Clear to auscultation bilaterally.  No wheezes, rales, or rhonchi.    Abdomen:  Focal tenderness in right pelvis and RLQ without rebound or guarding.     Musculoskeletal:  Normal movement of all extremities without evidence for deficit.    Skin:  Warm and dry without rashes.    Neurologic:  Non-focal exam without asymmetric weakness or numbness.     Psychiatric:  Normal affect with appropriate interaction with examiner.    Emergency Department Course   Imaging:  Radiology findings were communicated with the patient who voiced understanding of the findings.    CT Abdomen/Pelvis with contrast:   1. No acute abnormality. No bowel obstruction or inflammation.  2. Colonic diverticula without acute diverticulitis.  3. Bilateral intrarenal stones. No ureteral stone or hydronephrosis. As per radiology.    Laboratory:  CBC: WBC: 8.9, HGB: 13.8, PLT: 222  CMP: Glucose 179 (H), o/w WNL (Creatinine: 0.91)    2341 Troponin: 0.036    Lipase: 135    UA with micro: small blood, protein albumin 100, large Leukocyte Esterase, WBC >182 (H),  (H), WBC clumps present, many bacteria o/w negative   Urine Culture: pending     Interventions:  2347 NS 1L IV  0109 NS 1L IV  0302 Rocephin 1 g IV    Emergency Department Course:  Nursing notes and vitals reviewed. 2349 I performed an exam of the patient as documented above.     IV inserted. Medicine administered as documented above. Blood drawn. This was sent to the lab for further testing, results above.    0106 I rechecked the patient and discussed the results of his workup thus far.     The patient was sent for a CT Abdomen Pelvis while in the emergency department, findings above.     The patient provided a urine sample here in the emergency department. This was sent for laboratory testing, findings above.     0234 I reevaluated the patient  and provided an update in regards to his ED course.      0305  I consulted with Dr. Martinez of the hospitalist services. They are in agreement to accept the patient for admission.    Findings and plan explained to the Patient who consents to admission. Discussed the patient with Dr. Martinez, who will admit the patient to a medical bed for further monitoring, evaluation, and treatment.    Impression & Plan    Medical Decision Making:  This previously healthy 89-year-old presents with complaints of having a week of right-sided abdominal pain after having a day of vomiting and diarrhea.  Because of ongoing nausea, he has not been able to tolerate his normal p.o. intake, resulting in increasing weakness.  Urgent care sent him here for further evaluation of an elderly gentleman with abdominal pain.  His laboratory investigation is completely unremarkable.  CT the abdomen is also normal.  Urinalysis was finally obtained which shows significant infection.  However, considering that there is no other signs of systemic symptoms, he does not appear to be septic.  However, due to his increasing weakness, he will certainly require admission to the hospital.  He was given Rocephin and a urine culture is pending.  I spoke with Dr. Blaze Martinez of the hospitalist service will admit the patient    Diagnosis:    ICD-10-CM   1. Generalized muscle weakness M62.81   2. Abdominal pain, right lower quadrant R10.31   3. Urinary tract infection with hematuria, site unspecified N39.0    R31.9        Disposition:   Admitted to Dr. Martinez of the hospitalist services.    Scribe Disclosure:  I, Alessandra Mcwilliams, am serving as a scribe on 2/7/2018 at 11:49 PM to personally document services performed by Trierweiler, Chad A, MD based on my observations and the provider's statements to me.        EMERGENCY DEPARTMENT       Trierweiler, Chad A, MD  02/08/18 0503

## 2018-02-09 ENCOUNTER — APPOINTMENT (OUTPATIENT)
Dept: OCCUPATIONAL THERAPY | Facility: CLINIC | Age: 83
DRG: 690 | End: 2018-02-09
Payer: MEDICARE

## 2018-02-09 ENCOUNTER — APPOINTMENT (OUTPATIENT)
Dept: PHYSICAL THERAPY | Facility: CLINIC | Age: 83
DRG: 690 | End: 2018-02-09
Payer: MEDICARE

## 2018-02-09 LAB
ANION GAP SERPL CALCULATED.3IONS-SCNC: 6 MMOL/L (ref 3–14)
BUN SERPL-MCNC: 10 MG/DL (ref 7–30)
CALCIUM SERPL-MCNC: 8.2 MG/DL (ref 8.5–10.1)
CHLORIDE SERPL-SCNC: 107 MMOL/L (ref 94–109)
CO2 SERPL-SCNC: 26 MMOL/L (ref 20–32)
CREAT SERPL-MCNC: 0.73 MG/DL (ref 0.66–1.25)
ERYTHROCYTE [DISTWIDTH] IN BLOOD BY AUTOMATED COUNT: 13.7 % (ref 10–15)
GFR SERPL CREATININE-BSD FRML MDRD: >90 ML/MIN/1.7M2
GLUCOSE BLDC GLUCOMTR-MCNC: 117 MG/DL (ref 70–99)
GLUCOSE BLDC GLUCOMTR-MCNC: 118 MG/DL (ref 70–99)
GLUCOSE BLDC GLUCOMTR-MCNC: 127 MG/DL (ref 70–99)
GLUCOSE BLDC GLUCOMTR-MCNC: 146 MG/DL (ref 70–99)
GLUCOSE BLDC GLUCOMTR-MCNC: 152 MG/DL (ref 70–99)
GLUCOSE SERPL-MCNC: 145 MG/DL (ref 70–99)
HCT VFR BLD AUTO: 37.1 % (ref 40–53)
HGB BLD-MCNC: 12 G/DL (ref 13.3–17.7)
MCH RBC QN AUTO: 30.2 PG (ref 26.5–33)
MCHC RBC AUTO-ENTMCNC: 32.3 G/DL (ref 31.5–36.5)
MCV RBC AUTO: 93 FL (ref 78–100)
PLATELET # BLD AUTO: 142 10E9/L (ref 150–450)
POTASSIUM SERPL-SCNC: 4.1 MMOL/L (ref 3.4–5.3)
RBC # BLD AUTO: 3.98 10E12/L (ref 4.4–5.9)
SODIUM SERPL-SCNC: 139 MMOL/L (ref 133–144)
WBC # BLD AUTO: 6.4 10E9/L (ref 4–11)

## 2018-02-09 PROCEDURE — 97116 GAIT TRAINING THERAPY: CPT | Mod: GP

## 2018-02-09 PROCEDURE — 40000133 ZZH STATISTIC OT WARD VISIT

## 2018-02-09 PROCEDURE — 99232 SBSQ HOSP IP/OBS MODERATE 35: CPT | Performed by: INTERNAL MEDICINE

## 2018-02-09 PROCEDURE — 97530 THERAPEUTIC ACTIVITIES: CPT | Mod: GO

## 2018-02-09 PROCEDURE — 25000128 H RX IP 250 OP 636: Performed by: INTERNAL MEDICINE

## 2018-02-09 PROCEDURE — 97535 SELF CARE MNGMENT TRAINING: CPT | Mod: GO

## 2018-02-09 PROCEDURE — A9270 NON-COVERED ITEM OR SERVICE: HCPCS | Mod: GY | Performed by: INTERNAL MEDICINE

## 2018-02-09 PROCEDURE — 40000193 ZZH STATISTIC PT WARD VISIT

## 2018-02-09 PROCEDURE — 97530 THERAPEUTIC ACTIVITIES: CPT | Mod: GP

## 2018-02-09 PROCEDURE — 12000000 ZZH R&B MED SURG/OB

## 2018-02-09 PROCEDURE — 80048 BASIC METABOLIC PNL TOTAL CA: CPT | Performed by: INTERNAL MEDICINE

## 2018-02-09 PROCEDURE — 85027 COMPLETE CBC AUTOMATED: CPT | Performed by: INTERNAL MEDICINE

## 2018-02-09 PROCEDURE — 27210995 ZZH RX 272: Performed by: INTERNAL MEDICINE

## 2018-02-09 PROCEDURE — 25000132 ZZH RX MED GY IP 250 OP 250 PS 637: Mod: GY | Performed by: INTERNAL MEDICINE

## 2018-02-09 PROCEDURE — 36415 COLL VENOUS BLD VENIPUNCTURE: CPT | Performed by: INTERNAL MEDICINE

## 2018-02-09 PROCEDURE — 00000146 ZZHCL STATISTIC GLUCOSE BY METER IP

## 2018-02-09 RX ADMIN — SODIUM CHLORIDE: 9 INJECTION, SOLUTION INTRAVENOUS at 09:12

## 2018-02-09 RX ADMIN — DULOXETINE HYDROCHLORIDE 30 MG: 30 CAPSULE, DELAYED RELEASE ORAL at 09:16

## 2018-02-09 RX ADMIN — GLIPIZIDE 10 MG: 10 TABLET ORAL at 17:39

## 2018-02-09 RX ADMIN — ATORVASTATIN CALCIUM 80 MG: 40 TABLET, FILM COATED ORAL at 09:16

## 2018-02-09 RX ADMIN — CLOPIDOGREL 75 MG: 75 TABLET, FILM COATED ORAL at 09:16

## 2018-02-09 RX ADMIN — EXENATIDE 5 MCG: 250 INJECTION SUBCUTANEOUS at 09:14

## 2018-02-09 RX ADMIN — IPRATROPIUM BROMIDE 2 SPRAY: 21 SPRAY NASAL at 18:43

## 2018-02-09 RX ADMIN — EXENATIDE 5 MCG: 250 INJECTION SUBCUTANEOUS at 17:39

## 2018-02-09 RX ADMIN — CEFTRIAXONE 1 G: 10 INJECTION, POWDER, FOR SOLUTION INTRAVENOUS at 02:36

## 2018-02-09 RX ADMIN — METFORMIN HYDROCHLORIDE 1000 MG: 500 TABLET, FILM COATED ORAL at 17:38

## 2018-02-09 RX ADMIN — GLIPIZIDE 10 MG: 10 TABLET ORAL at 06:33

## 2018-02-09 RX ADMIN — OMEPRAZOLE 40 MG: 20 CAPSULE, DELAYED RELEASE ORAL at 09:16

## 2018-02-09 RX ADMIN — ASPIRIN 81 MG: 81 TABLET, COATED ORAL at 09:16

## 2018-02-09 RX ADMIN — Medication 12.5 MG: at 09:16

## 2018-02-09 RX ADMIN — METFORMIN HYDROCHLORIDE 1000 MG: 500 TABLET, FILM COATED ORAL at 09:16

## 2018-02-09 RX ADMIN — IPRATROPIUM BROMIDE 2 SPRAY: 21 SPRAY NASAL at 06:33

## 2018-02-09 RX ADMIN — LISINOPRIL 2.5 MG: 2.5 TABLET ORAL at 09:16

## 2018-02-09 RX ADMIN — TAMSULOSIN HYDROCHLORIDE 0.4 MG: 0.4 CAPSULE ORAL at 09:16

## 2018-02-09 NOTE — PLAN OF CARE
Problem: Patient Care Overview  Goal: Plan of Care/Patient Progress Review  Outcome: No Change  A&O x4. VSS on RA. A1 with walker to BR. IVF NS running at 100. Urinary frequency noted. LS dim. Denies pain, SOB. IV abx given. . PT/OT follow. Cont to monitor.

## 2018-02-09 NOTE — PROGRESS NOTES
SPIRITUAL HEALTH SERVICES Progress Note  [facility] 66    SH, referral attempted visit,  attempted two times to visit -not available and sleeping.    SH will follow and try back later today.    SH returned. The patient talked about growing up in [place] and included Mu-ism life as a kids. The patient related the changes over time and his sonia as a Yazidism with Protestant friends.    SH provided care in listening and offered a general blessing.           Shiv Rose  Chaplain Resident

## 2018-02-09 NOTE — PLAN OF CARE
Problem: Patient Care Overview  Goal: Plan of Care/Patient Progress Review  Pt is A&Ox4, calm, cooperative and forgetful at times.  VSS on RA. Denied pain/SOB. Up x 1 assist with walker/GB to bathroom and ambulated to hole way with staff.  Mod CHO diet and tolaraing well. .  IVF infusing at 100 cc/hr.  PT/OT following. Discharge pending in progress. will continue to monitor.

## 2018-02-09 NOTE — PLAN OF CARE
Problem: Patient Care Overview  Goal: Plan of Care/Patient Progress Review  Discharge Planner OT   Patient plan for discharge: Home with A from friend/roommate  Current status: Patient is SBA for functional transfers to toilet, G/H tasks at sink, clothing retrieval and lower body dressing  Barriers to return to prior living situation: Current level of A  Recommendations for discharge: TCU  Rationale for recommendations: TCU recommended as pt not safe to return home. Continued OT in TCU setting needed to progress patient to prior level of functional mobility       Entered by: Anne Marie Christianson 02/09/2018 3:29 PM

## 2018-02-09 NOTE — PROGRESS NOTES
"[facility]  Hospitalist Progress Note  Blaze Martinez MD  02/09/2018    Assessment & Plan   ASSESSMENT:  Dustin Pearce is a 89-year-old with abdominal pain and diarrhea for the last week, weakness, multiple falls, finding of acute urinary tract infection being admitted for further treatment.       PLAN:   1.  Urinary tract infection with multiple falls.    - blood and urine cultures pending and negative to date.  - Imaging studies did not show any complicated urinary tract infection such as ureteral stone or hydronephrosis.   - continue on ceftriaxone and await ucx  - could discharge on ceftin at discharge for 7 days.  - eating and drinking well, will SL IVF  2.  Diabetes type II.    - most recent A1c is 8.6.    - continue on a moderate carbohydrate diet and do Accu-Cheks 4 times daily and cover with sliding scale insulin.    - continue the patient on his Byetta and Glucotrol and metformin.   3.  History of benign prostatic hypertrophy.    - continue the patient on Flomax.   4.  ASCVD and peripheral arterial disease.    - continue the patient on Plavix, Lipitor, lisinopril and metoprolol.   - blood pressure is under reasonably good control.   5.  Depression.    - continue the patient on Cymbalta.   6.  Hyperlipidemia.    - continue the patient on Lipitor.   7.  Reflux disease.    - continue the patient on omeprazole.     8.  Deep venous thrombosis prophylaxis, patient with compression boots   9. Disposition  - PT/OT to evaluate  - home vs home therapy vs TCU      Interval History   - chart reviewed  - he feels  100% better  - abd pain resolved    -Data reviewed today: I reviewed all new labs and imaging over the last 24 hours. I personally reviewed no images or EKG's today.    Physical Exam   Heart Rate: 80, Blood pressure 138/65, pulse 74, temperature 97.5  F (36.4  C), temperature source Oral, resp. rate 18, height 1.651 m (5' 5\"), weight 69.2 kg (152 lb 8 oz), SpO2 95 %.  Vitals:    02/07/18 " 2145 02/08/18 0417   Weight: 72.6 kg (160 lb) 69.2 kg (152 lb 8 oz)     Vital Signs with Ranges  Temp:  [97.5  F (36.4  C)-98.2  F (36.8  C)] 97.5  F (36.4  C)  Pulse:  [74] 74  Heart Rate:  [71-80] 80  Resp:  [18] 18  BP: (102-138)/(48-65) 138/65  SpO2:  [95 %-99 %] 95 %  I/O's Last 24 hours  I/O last 3 completed shifts:  In: 2244 [P.O.:770; I.V.:1474]  Out: -     Constitutional: Awake, alert, cooperative, no apparent distress  Respiratory: Clear to auscultation bilaterally, no crackles or wheezing  Cardiovascular: Regular rate and rhythm, normal S1 and S2, and no murmur noted  GI: Normal bowel sounds, soft, non-distended, non-tender  Skin/Integumen: No rashes, no cyanosis, no edema  Other:      Medications   All medications were reviewed.      aspirin  81 mg Oral Daily     atorvastatin (LIPITOR) tablet 80 mg  80 mg Oral Daily     clopidogrel (PLAVIX) tablet 75 mg  75 mg Oral Daily     DULoxetine  30 mg Oral Daily     exenatide  5 mcg Subcutaneous BID AC     glipiZIDE  10 mg Oral BID AC     ipratropium  2 spray Both Nostrils Q12H     lisinopril (PRINIVIL/Zestril) tablet 2.5 mg  2.5 mg Oral Daily     metFORMIN (GLUCOPHAGE) tablet 1,000 mg  1,000 mg Oral BID w/meals     metoprolol succinate  12.5 mg Oral Daily     omeprazole  40 mg Oral Daily     tamsulosin  0.4 mg Oral Daily     cefTRIAXone  1 g Intravenous Q24H        Data     Recent Labs  Lab 02/09/18  0831 02/07/18  2341   WBC 6.4 8.9   HGB 12.0* 14.5   MCV 93 93   * 222    136   POTASSIUM 4.1 4.1   CHLORIDE 107 98   CO2 26 28   BUN 10 19   CR 0.73 0.91   ANIONGAP 6 10   ALLISON 8.2* 9.5   * 179*   ALBUMIN  --  4.0   PROTTOTAL  --  7.6   BILITOTAL  --  0.6   ALKPHOS  --  81   ALT  --  30   AST  --  18   LIPASE  --  135   TROPI  --  0.036       No results found for this or any previous visit (from the past 24 hour(s)).    Blaze Martinez MD  Text Page  (7am to 6pm)

## 2018-02-09 NOTE — PLAN OF CARE
Problem: Patient Care Overview  Goal: Plan of Care/Patient Progress Review  Outcome: Improving  Patient A&Ox4, forgetful.  Up SBA/1 assistance with gait belt walker, ambulating to bathroom.  Up in chair for meals.  Vitals stable.  Denies pain, denies nausea.  Tolerating diet.   and 127.  On IV Rocephin, IVF's SL.  UC pending final results.  PT/OT following.  Continue to monitor.

## 2018-02-10 ENCOUNTER — APPOINTMENT (OUTPATIENT)
Dept: OCCUPATIONAL THERAPY | Facility: CLINIC | Age: 83
DRG: 690 | End: 2018-02-10
Payer: MEDICARE

## 2018-02-10 LAB
GLUCOSE BLDC GLUCOMTR-MCNC: 108 MG/DL (ref 70–99)
GLUCOSE BLDC GLUCOMTR-MCNC: 128 MG/DL (ref 70–99)
GLUCOSE BLDC GLUCOMTR-MCNC: 131 MG/DL (ref 70–99)
GLUCOSE BLDC GLUCOMTR-MCNC: 153 MG/DL (ref 70–99)
GLUCOSE BLDC GLUCOMTR-MCNC: 177 MG/DL (ref 70–99)

## 2018-02-10 PROCEDURE — 40000133 ZZH STATISTIC OT WARD VISIT: Performed by: OCCUPATIONAL THERAPIST

## 2018-02-10 PROCEDURE — A9270 NON-COVERED ITEM OR SERVICE: HCPCS | Mod: GY | Performed by: INTERNAL MEDICINE

## 2018-02-10 PROCEDURE — 99207 ZZC CDG-MDM COMPONENT: MEETS LOW - DOWN CODED: CPT | Performed by: INTERNAL MEDICINE

## 2018-02-10 PROCEDURE — 99232 SBSQ HOSP IP/OBS MODERATE 35: CPT | Performed by: INTERNAL MEDICINE

## 2018-02-10 PROCEDURE — 97530 THERAPEUTIC ACTIVITIES: CPT | Mod: GO | Performed by: OCCUPATIONAL THERAPIST

## 2018-02-10 PROCEDURE — 25000132 ZZH RX MED GY IP 250 OP 250 PS 637: Mod: GY | Performed by: INTERNAL MEDICINE

## 2018-02-10 PROCEDURE — 12000000 ZZH R&B MED SURG/OB

## 2018-02-10 PROCEDURE — 00000146 ZZHCL STATISTIC GLUCOSE BY METER IP

## 2018-02-10 PROCEDURE — 25000128 H RX IP 250 OP 636: Performed by: INTERNAL MEDICINE

## 2018-02-10 PROCEDURE — 27210995 ZZH RX 272: Performed by: INTERNAL MEDICINE

## 2018-02-10 RX ORDER — AMPICILLIN TRIHYDRATE 500 MG
1000 CAPSULE ORAL EVERY 6 HOURS SCHEDULED
Status: DISCONTINUED | OUTPATIENT
Start: 2018-02-10 | End: 2018-02-11

## 2018-02-10 RX ORDER — AMPICILLIN 1 G/1
1 INJECTION, POWDER, FOR SOLUTION INTRAMUSCULAR; INTRAVENOUS EVERY 6 HOURS
Status: DISCONTINUED | OUTPATIENT
Start: 2018-02-10 | End: 2018-02-10

## 2018-02-10 RX ADMIN — Medication 12.5 MG: at 09:59

## 2018-02-10 RX ADMIN — EXENATIDE 5 MCG: 250 INJECTION SUBCUTANEOUS at 17:48

## 2018-02-10 RX ADMIN — IPRATROPIUM BROMIDE 2 SPRAY: 21 SPRAY NASAL at 17:47

## 2018-02-10 RX ADMIN — ATORVASTATIN CALCIUM 80 MG: 40 TABLET, FILM COATED ORAL at 10:00

## 2018-02-10 RX ADMIN — LISINOPRIL 2.5 MG: 2.5 TABLET ORAL at 09:59

## 2018-02-10 RX ADMIN — ASPIRIN 81 MG: 81 TABLET, COATED ORAL at 09:59

## 2018-02-10 RX ADMIN — GLIPIZIDE 10 MG: 10 TABLET ORAL at 17:45

## 2018-02-10 RX ADMIN — AMPICILLIN 1000 MG: 500 CAPSULE ORAL at 13:59

## 2018-02-10 RX ADMIN — IPRATROPIUM BROMIDE 2 SPRAY: 21 SPRAY NASAL at 06:37

## 2018-02-10 RX ADMIN — OMEPRAZOLE 40 MG: 20 CAPSULE, DELAYED RELEASE ORAL at 09:59

## 2018-02-10 RX ADMIN — DULOXETINE HYDROCHLORIDE 30 MG: 30 CAPSULE, DELAYED RELEASE ORAL at 09:59

## 2018-02-10 RX ADMIN — CEFTRIAXONE 1 G: 10 INJECTION, POWDER, FOR SOLUTION INTRAVENOUS at 03:14

## 2018-02-10 RX ADMIN — GLIPIZIDE 10 MG: 10 TABLET ORAL at 06:37

## 2018-02-10 RX ADMIN — POLYETHYLENE GLYCOL 3350 17 G: 17 POWDER, FOR SOLUTION ORAL at 01:55

## 2018-02-10 RX ADMIN — AMPICILLIN 1000 MG: 500 CAPSULE ORAL at 17:44

## 2018-02-10 RX ADMIN — METFORMIN HYDROCHLORIDE 1000 MG: 500 TABLET, FILM COATED ORAL at 17:45

## 2018-02-10 RX ADMIN — TAMSULOSIN HYDROCHLORIDE 0.4 MG: 0.4 CAPSULE ORAL at 09:59

## 2018-02-10 RX ADMIN — CLOPIDOGREL 75 MG: 75 TABLET, FILM COATED ORAL at 09:59

## 2018-02-10 RX ADMIN — METFORMIN HYDROCHLORIDE 1000 MG: 500 TABLET, FILM COATED ORAL at 09:59

## 2018-02-10 RX ADMIN — EXENATIDE 5 MCG: 250 INJECTION SUBCUTANEOUS at 08:04

## 2018-02-10 NOTE — PLAN OF CARE
"Problem: Patient Care Overview  Goal: Plan of Care/Patient Progress Review  Outcome: No Change  Pt A&Ox4. [place]. VSS on RA. L/s clear. Up assist x1 with walker and GB to B/R. Denies pain. Denies SOB and chest pain. Noted urinary frequency. Hypoactive BS. C/o constipation, last BM 2/6. Passing flatus \"once a while\" per pt. Gave prn Miralax/glycolax overnight. Effective, pt had large soft/brown stool this AM. Tolerating Mod CHO diet. No N&V. . On IV Rocephin. Urine culture pending. PT/OT following. D/c home vs TCU pending progress. Nursing continue to monitor.       "

## 2018-02-10 NOTE — PROGRESS NOTES
[facility]  Hospitalist Progress Note  Matt Purcell MD  02/10/2018    Assessment & Plan   ASSESSMENT:  Dustin Pearce is a 89-year-old with abdominal pain and diarrhea for the last week, weakness, multiple falls, finding of acute urinary tract infection being admitted for further treatment.       1.  Urinary tract infection with multiple falls.    Imaging studies did not show any complicated urinary tract infection such as ureteral stone or hydronephrosis.   - blood NGTD, Urine growing E. faecalis  - change abx from rocephin to Ampicillin 1gm PO Q6  - taking in good PO, pain controlled  - feeling a little weak still, PT recommends TCU    2.  Diabetes type II.    Most recent A1c is 8.6.      Recent Labs  Lab 02/10/18  1157 02/10/18  0743 02/10/18  0137 02/09/18  2110 02/09/18  1723 02/09/18  1112 02/09/18  0831  02/07/18  2341   GLC  --   --   --   --   --   --  145*  --  179*   * 128* 108* 146* 152* 127*  --   < >  --    < > = values in this interval not displayed.  - Goal is Pre-prandial <140 and post-prandial <180  - continue on a moderate carbohydrate diet and do Accu-Cheks 4 times daily and cover with sliding scale insulin.    - he does well with PTA Byetta, Glucotrol and metformin which has been continued for him to this point, will keep on today.     3.  History of benign prostatic hypertrophy.    - continue the patient on Flomax.     4.  ASCVD and peripheral arterial disease.    - continue the patient on Plavix, Lipitor, lisinopril and metoprolol.   - blood pressure is under reasonably good control.     5.  Depression.    - continue the patient on Cymbalta.     6.  Hyperlipidemia.    - continue the patient on Lipitor.     7.  Reflux disease.    - continue the patient on omeprazole.       Deep venous thrombosis prophylaxis: PCDs  CODE: Full  Disposition: PT recommends TCU, patient doesn't want TCU but he doesn't want to go home today due to weakness.  His wife wants TCU, but  "doesn't want home PT/RN.  I will give another night for him and his wife to talk also for SW to explore various TCUs in the area      Interval History   Reports he feels good today but very weak, when discussing TCU with him he is very averse to the idea, wife present and tries to reinforce, he asks for more time here, when told this isnt something that will likely get better in 24 hours he gets upset.  He has no other complaints at this time.    -Data reviewed today: I reviewed all new labs and imaging over the last 24 hours. I personally reviewed no images or EKG's today.    Physical Exam   Heart Rate: 70, Blood pressure 134/59, pulse 60, temperature 97.9  F (36.6  C), temperature source Oral, resp. rate 18, height 1.651 m (5' 5\"), weight 69.2 kg (152 lb 8 oz), SpO2 97 %.  Vitals:    02/07/18 2145 02/08/18 0417   Weight: 72.6 kg (160 lb) 69.2 kg (152 lb 8 oz)     Vital Signs with Ranges  Temp:  [97.9  F (36.6  C)-98.2  F (36.8  C)] 97.9  F (36.6  C)  Pulse:  [60-82] 60  Heart Rate:  [70-75] 70  Resp:  [18] 18  BP: (110-134)/(59-69) 134/59  SpO2:  [96 %-97 %] 97 %  I/O's Last 24 hours  I/O last 3 completed shifts:  In: 1245 [P.O.:740; I.V.:505]  Out: -     Constitutional: NAD, afebrile, A+Ox4  Respiratory: CTA B, normal WOB  Cardiovascular: RRR, no murmur  GI: S, NT, ND, normal BS  Skin/Integumen: Dry, warm, no edema      Medications   All medications were reviewed.      ampicillin  1,000 mg Oral Q6H NEETA     aspirin  81 mg Oral Daily     atorvastatin (LIPITOR) tablet 80 mg  80 mg Oral Daily     clopidogrel (PLAVIX) tablet 75 mg  75 mg Oral Daily     DULoxetine  30 mg Oral Daily     exenatide  5 mcg Subcutaneous BID AC     glipiZIDE  10 mg Oral BID AC     ipratropium  2 spray Both Nostrils Q12H     lisinopril (PRINIVIL/Zestril) tablet 2.5 mg  2.5 mg Oral Daily     metFORMIN (GLUCOPHAGE) tablet 1,000 mg  1,000 mg Oral BID w/meals     metoprolol succinate  12.5 mg Oral Daily     omeprazole  40 mg Oral Daily     " tamsulosin  0.4 mg Oral Daily        Data     Recent Labs  Lab 02/09/18  0831 02/07/18  2341   WBC 6.4 8.9   HGB 12.0* 14.5   MCV 93 93   * 222    136   POTASSIUM 4.1 4.1   CHLORIDE 107 98   CO2 26 28   BUN 10 19   CR 0.73 0.91   ANIONGAP 6 10   ALLISON 8.2* 9.5   * 179*   ALBUMIN  --  4.0   PROTTOTAL  --  7.6   BILITOTAL  --  0.6   ALKPHOS  --  81   ALT  --  30   AST  --  18   LIPASE  --  135   TROPI  --  0.036       No results found for this or any previous visit (from the past 24 hour(s)).

## 2018-02-10 NOTE — PLAN OF CARE
Problem: Patient Care Overview  Goal: Plan of Care/Patient Progress Review  Outcome: Improving  Care Plan Summary Note:  A and O x4 but forgetful, up with assist of one, GB, and walker. Voiding in Bathroom, denies pain or burning with urination. VS stable, [place]. Good appetite. DC to TCU vs home with home PT.  Glucose 152, 146.

## 2018-02-10 NOTE — PLAN OF CARE
Problem: Patient Care Overview  Goal: Plan of Care/Patient Progress Review  PT: Attemtped to see patient for PT session. Pt sleeping and once awake requested he sleep longer.

## 2018-02-10 NOTE — PLAN OF CARE
Problem: Patient Care Overview  Goal: Plan of Care/Patient Progress Review  Discharge Planner OT   Patient plan for discharge: TCU, although still prefers home  Current status: Pt completed transfers and ambulation with SBA with 2WW, pt reports frustration that he has not been training with a SEC with PT, provided ed on reasoning for use of 2WW, pt attempted a static stand without 2WW and had a LOB which required MIN A to correct. Pt completed bed mobility with SBA and VC.   Barriers to return to prior living situation: Balance, level of assist  Recommendations for discharge: TCU if pt refusing/has level of assist available: Home with assist/supervision for functional mobility and ADLs and home OT for cognitive/home safety  Rationale for recommendations: Pt would benefit from Continued OT in TCU setting to progress patient to prior level of functional mobility. If pt discharges home, would recommend assist for all functional mobility and ADLs, as well as home OT.        Entered by: Linda Atkins 02/10/2018 4:05 PM

## 2018-02-10 NOTE — PROGRESS NOTES
"Care Transition Initial Assessment -   Reason For Consult: discharge planning, facility placement  Met with: Patient and significant other, Halina.     Active Problems:    UTI (urinary tract infection)       DATA  Lives With: significant other  Living Arrangements: house  Description of Support System: Involved  Support Assessment: Adequate family and caregiver support.   Identified issues/concerns regarding health management: PT/OT recommend TCU. Pt has been to [facility] twice. Most recently in January. Halina in favor of TCU. Had home care a few years ago and states it \"didn't work out very well.\" Halina understood the homebound requirement and the frequency. Pt noncommittal. Both agreed to referrals to TCU. Did warn them [facility] may be full as SW spoke with them earlier in the day.      Quality Of Family Relationships: involved  Transportation Available: family or friend will provide (signficant other drives)    ASSESSMENT  Cognitive Status: Appears alert and oriented. Halina involved and assisting with decision making.   Concerns to be addressed: On-going.     PLAN  Financial costs for the patient includes: None.  Patient given options and choices for discharge: Yes.  Patient/family is agreeable to the plan?  YES  Patient Goals and Preferences: TCU vs home.  Patient anticipates discharging to: TCU vs home.    ESAU Foreman, LGSW  [phone]            "

## 2018-02-10 NOTE — PLAN OF CARE
Problem: Patient Care Overview  Goal: Plan of Care/Patient Progress Review  Outcome: No Change  A&Ox4, forgetful. VSS on RA, denies pain. Voiding with frequency. Up A1/W/GB to BR. +BM this shift (per pt). Mod CHO diet, good appetite.  and 177. UC resulted Gram +ve Cocci and E. Faecalis, IV Rocephin d/c'd. N.O po Ampicillin Q6. New PIV L FA, SL. D/C pending progress

## 2018-02-10 NOTE — PROVIDER NOTIFICATION
MD Notification    Notified Person:  MD    Notified Persons Name: Dr. Purcell    Notification Date/Time: 2/10/18 @1130    Notification Interaction:  Text paged Physician    Purpose of Notification: FYI: UC +ve for Gram +ve Cocci and E. Faecalis. Continues on IV Rocephin.    Orders Received: Ampicillin Q6 ordered, Rocephin D/C'd.    Comments:

## 2018-02-11 VITALS
DIASTOLIC BLOOD PRESSURE: 61 MMHG | OXYGEN SATURATION: 95 % | HEART RATE: 73 BPM | BODY MASS INDEX: 25.41 KG/M2 | WEIGHT: 152.5 LBS | TEMPERATURE: 98.1 F | HEIGHT: 65 IN | SYSTOLIC BLOOD PRESSURE: 117 MMHG | RESPIRATION RATE: 18 BRPM

## 2018-02-11 LAB
GLUCOSE BLDC GLUCOMTR-MCNC: 121 MG/DL (ref 70–99)
GLUCOSE BLDC GLUCOMTR-MCNC: 131 MG/DL (ref 70–99)

## 2018-02-11 PROCEDURE — 25000132 ZZH RX MED GY IP 250 OP 250 PS 637: Mod: GY | Performed by: INTERNAL MEDICINE

## 2018-02-11 PROCEDURE — A9270 NON-COVERED ITEM OR SERVICE: HCPCS | Mod: GY | Performed by: INTERNAL MEDICINE

## 2018-02-11 PROCEDURE — 00000146 ZZHCL STATISTIC GLUCOSE BY METER IP

## 2018-02-11 PROCEDURE — 99238 HOSP IP/OBS DSCHRG MGMT 30/<: CPT | Performed by: INTERNAL MEDICINE

## 2018-02-11 RX ORDER — AMPICILLIN TRIHYDRATE 500 MG
500 CAPSULE ORAL EVERY 6 HOURS SCHEDULED
Status: DISCONTINUED | OUTPATIENT
Start: 2018-02-11 | End: 2018-02-11 | Stop reason: HOSPADM

## 2018-02-11 RX ORDER — ACETAMINOPHEN 325 MG/1
650 TABLET ORAL EVERY 4 HOURS PRN
COMMUNITY
End: 2018-02-19

## 2018-02-11 RX ORDER — AMPICILLIN TRIHYDRATE 500 MG
500 CAPSULE ORAL EVERY 6 HOURS
Qty: 16 CAPSULE | Refills: 0 | Status: SHIPPED | OUTPATIENT
Start: 2018-02-11 | End: 2018-02-15

## 2018-02-11 RX ORDER — ACETAMINOPHEN 325 MG/1
650 TABLET ORAL EVERY 4 HOURS PRN
Qty: 100 TABLET | Refills: 0 | Status: SHIPPED | OUTPATIENT
Start: 2018-02-11 | End: 2018-02-11 | Stop reason: DRUGHIGH

## 2018-02-11 RX ADMIN — ASPIRIN 81 MG: 81 TABLET, COATED ORAL at 08:33

## 2018-02-11 RX ADMIN — CLOPIDOGREL 75 MG: 75 TABLET, FILM COATED ORAL at 08:33

## 2018-02-11 RX ADMIN — EXENATIDE 5 MCG: 250 INJECTION SUBCUTANEOUS at 08:10

## 2018-02-11 RX ADMIN — DULOXETINE HYDROCHLORIDE 30 MG: 30 CAPSULE, DELAYED RELEASE ORAL at 08:32

## 2018-02-11 RX ADMIN — TAMSULOSIN HYDROCHLORIDE 0.4 MG: 0.4 CAPSULE ORAL at 08:33

## 2018-02-11 RX ADMIN — OMEPRAZOLE 40 MG: 20 CAPSULE, DELAYED RELEASE ORAL at 09:59

## 2018-02-11 RX ADMIN — AMPICILLIN 1000 MG: 500 CAPSULE ORAL at 06:33

## 2018-02-11 RX ADMIN — IPRATROPIUM BROMIDE 2 SPRAY: 21 SPRAY NASAL at 06:33

## 2018-02-11 RX ADMIN — LISINOPRIL 2.5 MG: 2.5 TABLET ORAL at 08:33

## 2018-02-11 RX ADMIN — METFORMIN HYDROCHLORIDE 1000 MG: 500 TABLET, FILM COATED ORAL at 08:33

## 2018-02-11 RX ADMIN — AMPICILLIN 1000 MG: 500 CAPSULE ORAL at 00:28

## 2018-02-11 RX ADMIN — Medication 12.5 MG: at 08:32

## 2018-02-11 RX ADMIN — ATORVASTATIN CALCIUM 80 MG: 40 TABLET, FILM COATED ORAL at 08:33

## 2018-02-11 RX ADMIN — GLIPIZIDE 10 MG: 10 TABLET ORAL at 06:33

## 2018-02-11 RX ADMIN — AMPICILLIN 500 MG: 500 CAPSULE ORAL at 12:10

## 2018-02-11 NOTE — PROGRESS NOTES
Met with the patient and wife at the bedside regarding discharge planning.  Patient has an appointment next week with Dr. Morrissey.  Wife wants to keep this appointment and will be calling Dr. Morrissey for an update tomorrow. She will cancel/reschedule if PCP wants fup at d/c from TCU  -Hand off to PCP

## 2018-02-11 NOTE — PLAN OF CARE
Problem: Patient Care Overview  Goal: Plan of Care/Patient Progress Review  Outcome: No Change  Pt A&Ox4. [place]. VSS on RA. L/s clear. Denies pain. +BS. +flatus. No BM this shift.  Voiding adequately. Up assist x1 with Walker and GB to B/R. Tolerating Mod CHO diet. No N&V.  overnight.  On PO Ampicillin.  PT/OT following. Possible d/c to TCU pending progress/placement. SW following. Nursing continue to monitor.

## 2018-02-11 NOTE — PLAN OF CARE
Problem: Patient Care Overview  Goal: Plan of Care/Patient Progress Review  PT: Pt discharged to TCU prior to being seen by PT 2/11.    PT goals not met.    Physical Therapy Discharge Summary    Reason for therapy discharge:    Discharged to TCU  Progress towards therapy goal(s). See goals on Care Plan in [facility] electronic health record for goal details.  Goals not met.  Barriers to achieving goals:   discharge from facility.    Therapy recommendation(s):    Continued therapy is recommended.  Rationale/Recommendations:  Patient would benefit from continued skilled to progress his safety and independence with all functional mobility. .

## 2018-02-11 NOTE — PROGRESS NOTES
D: SW following for DC planning.   I: Pt will DC today to [facility]. Pt and spouse in agreement. HE WC ride set for 1500. Spouse understands she will be billed for the cost of the ride. Orders faxed and facility updated. Nursing aware.   P: DC today.     Jacque Wise MSW, LGSW  [phone]    PAS-RR    D: Per DHS regulation, SW completed and submitted PAS-RR to [place] [employer] Direct Connect via the Senior Link[employer] Line.  PAS-RR confirmation # is : [id number]    I: SW spoke with spouse and they are aware a PAS-RR has been submitted.  SW reviewed with spouse that they may be contacted for a follow up appointment within 10 days of hospital discharge if their SNF stay is < 30 days.  Contact information for Senior [employer] Line was also provided.    A: Spouse verbalized understanding.    P: Further questions may be directed to Senior [employer] Line at #[phone], option #4 for PAS-RR staff.

## 2018-02-11 NOTE — PLAN OF CARE
Problem: Patient Care Overview  Goal: Plan of Care/Patient Progress Review  Outcome: Adequate for Discharge Date Met: 02/11/18  Pt D/C'd to [facility] via w/c @1808. All belongings, paperwork and medications (Abx and Tylenol) sent with pt. S.O. to meet pt at facility.       Post-Care Instructions: I reviewed with the patient in detail post-care instructions. Patient is to wear sunprotection, and avoid picking at any of the treated lesions. Pt may apply Vaseline to crusted or scabbing areas. Render Post-Care Instructions In Note?: no Duration Of Freeze Thaw-Cycle (Seconds): 3 Number Of Freeze-Thaw Cycles: 1 freeze-thaw cycle Detail Level: Detailed Consent: The patient's consent was obtained including but not limited to risks of crusting, scabbing, blistering, scarring, darker or lighter pigmentary change, recurrence, incomplete removal and infection.

## 2018-02-11 NOTE — PROGRESS NOTES
A&Ox4, VSS on RA. LS clear. Denies pain. Up A1/W/GB to BR, voiding with frequency (improving). Ambulated in halls with staff. Mod CHO diet with good appetite.  and 121. PO Ampicillin per orders. Received shower this shift. Plans to transfer to [facility] via w/c transport, p/up @1500. S.O at bedside.

## 2018-02-11 NOTE — PLAN OF CARE
Problem: Patient Care Overview  Goal: Plan of Care/Patient Progress Review  Outcome: Improving  Pt. A and O x4, very forgetful, [place]. Glucose 153, 131. Up with assistance of one, GB and walker. VS stable, denies discomfort. Voids with out difficulty in Bathroom.

## 2018-02-11 NOTE — PLAN OF CARE
Problem: Patient Care Overview  Goal: Plan of Care/Patient Progress Review  Occupational Therapy Discharge Summary    Reason for therapy discharge:    Discharged to transitional care facility.    Progress towards therapy goal(s). See goals on Care Plan in [facility] electronic health record for goal details.  Goals not met.  Barriers to achieving goals:   discharge from facility.    Therapy recommendation(s):    Continued therapy is recommended.  Rationale/Recommendations:  to maximize safety and IND in ADLs. .

## 2018-02-11 NOTE — DISCHARGE SUMMARY
Admit Date:     02/07/2018   Discharge Date:           PRIMARY CARE PHYSICIAN:  Matt Morrissey MD, [facility].        REASON FOR ADMISSION:  Weakness, abdominal pain, multiple falls.      DISCHARGE DIAGNOSES:   1.  Urinary tract infection with Enterococcus faecalis.   2.  Physical deconditioning and generalized weakness.   3.  Type 2 diabetes mellitus.   4.  Benign prostatic hypertrophy (BPH).   5.  Atherosclerotic coronary vascular disease.   6.  Peripheral artery disease.   7.  Depression.   8.  Hyperlipidemia   9.  Gastroesophageal reflux disease (GERD).      DISPOSITION:  Discharging to transitional care unit.      FOLLOWUP INSTRUCTIONS:  You have an appointment scheduled with Dr. Morrissey for Thursday 02/15.  Please keep that appointment for followup.      CONSULTS:  None.      DISCHARGE MEDICATIONS:   New meds on discharge:   1.  Tylenol 650 q.6 h. p.r.n. fever or pain.   2.  Ampicillin 500 mg p.o. q.6 h. x 4 more days.      Home meds to continue unchanged at discharge:   1.  Omeprazole 40 mg daily as needed.   2.  Byetta 5 mcg subcutaneous b.i.d. before meals.   3.  Magnesium oxide 400 mg b.i.d.   4.  MiraLax 17 grams daily as needed.   5.  Vitamin D3, 50,000 units once a week.     6.  Lisinopril 2.5 mg daily.     7.  Topral XL 12.5 mg daily.   8.  Glipizide 10 mg 2 times daily before meals.   9.  Amitriptyline 25 mg nightly as needed for sleep.    10.  Atrovent spray 2 sprays both nostrils 2 times daily p.r.n.   11.  Duloxetine 30 mg daily.   12.  Flomax 0.4 mg daily.   13.  Aspirin 81 mg a day.   14.  Metformin 1 gram 2 times daily with meals.   15.  Plavix 75 mg daily.      PHYSICAL EXAMINATION ON DAY OF  DISCHARGE:   VITAL SIGNS:  Blood pressure 117/61, O2 SATS 95% on room air, respiratory rate 18, heart rate 73, temperature 98.1 degrees Fahrenheit taken orally.   GENERAL:  Well-nourished appearing elderly male in no apparent distress, alert and oriented x 4, afebrile.   HEENT:  Normocephalic,  atraumatic.  No oropharyngeal erythema, no cervical lymphadenopathy, no thyromegaly.   RESPIRATORY:  Lungs clear to auscultation bilaterally.  Normal work with breathing.  No wheezes, rales or rhonchi.   CARDIOVASCULAR:  Normal S1, S2, no S3, S4, regular rate and rhythm, no murmurs, rubs or gallops, 2+ pulses palpable in upper extremities.   ABDOMEN:  Normal bowel sounds, abdomen soft, nontender, nondistended.  No hepatosplenomegaly or mass palpable.   EXTREMITIES:  No clubbing, cyanosis or edema.     NEUROLOGIC:  Cranial nerves II-XII are intact, 5/5 strength in 4 extremities.  Sensation is intact diffusely, normal coordination by bilateral finger-to-nose test.      HOSPITAL COURSE:  Mr. Pearce is a 89-year-old male.  He has a past medical history of paroxysmal AFib, coronary artery disease.  He had an MI in the 70s.  He is now on chronic Plavix and aspirin, also suffers from diabetes mellitus with an A1C of 8.9, longstanding history of cardiomyopathy, his ejection fraction about 40%.  This patient had been having increasing abdominal pain and generalized weakness for about a week and pain was more localized on the right side than the left, also some nausea, vomiting and diarrhea, reported to be having multiple falls at home.  He says he just feels like his legs are giving out on him all the time.  They went to Urgent Care who referred him to [facility] for further evaluation.  He was seen in the ED and noted to have a pretty significant urinary tract infection on urinalysis.  Urine culture came back growing Enterococcus faecalis susceptible to ampicillin, so on day 3 of his stay I switched him over from Rocephin to ampicillin.  He has done well 24 hours after the switch to oral.  PT, OT has evaluated him.  He has pretty significant physical deconditioning, not very stable on his own.  He is requiring assist of 1-2 times, so recommendations have been for transitional care unit for further therapies and Social Work  has found a bed for him today at [facility], so he should go later this afternoon.         YANNICK ZARAGOZA MD             D: 2018   T: 2018   MT: CC      Name:     SID AGUIAR   MRN:      [id number]        Account:        [id number]   :      1928           Admit Date:     2018                                  Discharge Date:       Document: [id number]       cc: Matt Morrissey MD

## 2018-02-12 ENCOUNTER — CARE COORDINATION (OUTPATIENT)
Dept: CARE COORDINATION | Facility: CLINIC | Age: 83
End: 2018-02-12

## 2018-02-12 LAB
BACTERIA SPEC CULT: ABNORMAL
BACTERIA SPEC CULT: ABNORMAL
Lab: ABNORMAL
SPECIMEN SOURCE: ABNORMAL

## 2018-02-12 NOTE — PROGRESS NOTES
Clinic Care Coordination Contact  Care Coordination Transition Communication         Clinical Data: Patient was hospitalized at [facility] from 02/07/18 to 02/11/18 with diagnosis of UTI and physical deconditioning.     Transition to Facility:              Facility Name: [facility]              Contact name and phone number/fax: HUBER Quintero [phone]    Plan: RN/SW Care Coordinator will await notification from facility staff informing RN/SW Care Coordinator of patient's discharge plans/needs. RN/SW Care Coordinator will review chart and outreach to facility staff every 4 weeks and as needed.     Elvira Guerra, RN, BSN, PHN  Clinic Care Coordinator  [facility]  [phone]

## 2018-02-13 ENCOUNTER — TELEPHONE (OUTPATIENT)
Dept: FAMILY MEDICINE | Facility: CLINIC | Age: 83
End: 2018-02-13

## 2018-02-13 ENCOUNTER — NURSING HOME VISIT (OUTPATIENT)
Dept: GERIATRICS | Facility: CLINIC | Age: 83
End: 2018-02-13
Payer: MEDICARE

## 2018-02-13 VITALS
HEART RATE: 69 BPM | SYSTOLIC BLOOD PRESSURE: 106 MMHG | OXYGEN SATURATION: 96 % | TEMPERATURE: 98.2 F | DIASTOLIC BLOOD PRESSURE: 59 MMHG | RESPIRATION RATE: 20 BRPM

## 2018-02-13 DIAGNOSIS — G45.9 TRANSIENT CEREBRAL ISCHEMIA, UNSPECIFIED TYPE: ICD-10-CM

## 2018-02-13 DIAGNOSIS — N40.1 BENIGN NON-NODULAR PROSTATIC HYPERPLASIA WITH LOWER URINARY TRACT SYMPTOMS: ICD-10-CM

## 2018-02-13 DIAGNOSIS — B95.2 UTI (URINARY TRACT INFECTION) DUE TO ENTEROCOCCUS: Primary | ICD-10-CM

## 2018-02-13 DIAGNOSIS — I48.0 PAROXYSMAL ATRIAL FIBRILLATION (H): ICD-10-CM

## 2018-02-13 DIAGNOSIS — I10 BENIGN ESSENTIAL HYPERTENSION: ICD-10-CM

## 2018-02-13 DIAGNOSIS — R53.81 PHYSICAL DECONDITIONING: ICD-10-CM

## 2018-02-13 DIAGNOSIS — E11.42 DM TYPE 2 WITH DIABETIC PERIPHERAL NEUROPATHY (H): ICD-10-CM

## 2018-02-13 DIAGNOSIS — I25.10 ATHEROSCLEROSIS OF CORONARY ARTERY OF NATIVE HEART WITHOUT ANGINA PECTORIS, UNSPECIFIED VESSEL OR LESION TYPE: ICD-10-CM

## 2018-02-13 DIAGNOSIS — K59.01 SLOW TRANSIT CONSTIPATION: ICD-10-CM

## 2018-02-13 DIAGNOSIS — R53.1 GENERALIZED WEAKNESS: ICD-10-CM

## 2018-02-13 DIAGNOSIS — E78.5 HYPERLIPIDEMIA, UNSPECIFIED HYPERLIPIDEMIA TYPE: ICD-10-CM

## 2018-02-13 DIAGNOSIS — K21.9 GASTROESOPHAGEAL REFLUX DISEASE WITHOUT ESOPHAGITIS: ICD-10-CM

## 2018-02-13 DIAGNOSIS — R11.2 NAUSEA AND VOMITING, INTRACTABILITY OF VOMITING NOT SPECIFIED, UNSPECIFIED VOMITING TYPE: ICD-10-CM

## 2018-02-13 DIAGNOSIS — F32.A DEPRESSION, UNSPECIFIED DEPRESSION TYPE: ICD-10-CM

## 2018-02-13 DIAGNOSIS — I63.9 CEREBROVASCULAR ACCIDENT (CVA), UNSPECIFIED MECHANISM (H): ICD-10-CM

## 2018-02-13 DIAGNOSIS — N39.0 UTI (URINARY TRACT INFECTION) DUE TO ENTEROCOCCUS: Primary | ICD-10-CM

## 2018-02-13 DIAGNOSIS — I73.9 PERIPHERAL ARTERY DISEASE (H): ICD-10-CM

## 2018-02-13 PROCEDURE — 99310 SBSQ NF CARE HIGH MDM 45: CPT | Performed by: NURSE PRACTITIONER

## 2018-02-13 RX ORDER — ONDANSETRON 4 MG/1
4 TABLET, FILM COATED ORAL 3 TIMES DAILY
COMMUNITY
End: 2018-02-19

## 2018-02-13 NOTE — PROGRESS NOTES
[facility]    PRIMARY CARE PROVIDER AND CLINIC:  Matt Morrissey [facility] [address] [facility] [address] / [facility] [address]    Chief Complaint   Patient presents with     Hospital F/U       HPI:    Dustin Pearce is a 89 year old  (1/31/1928),admitted to the [facility] from [facility].  Hospital stay 2/7/2018 through 2/11/2018.  Admitted to this facility for  rehab, medical management and nursing care.  HPI information obtained from: facility chart records, facility staff, patient report and [facility] chart review.         HOSPITAL COURSE:  Mr. Pearce is a 89-year-old male.  He has a past medical history of paroxysmal AFib, coronary artery disease.  He had an MI in the 70s.  He is now on chronic Plavix and aspirin, also suffers from diabetes mellitus with an A1C of 8.9, longstanding history of cardiomyopathy, his ejection fraction about 40%.  This patient had been having increasing abdominal pain and generalized weakness for about a week and pain was more localized on the right side than the left, also some nausea, vomiting and diarrhea, reported to be having multiple falls at home.  He says he just feels like his legs are giving out on him all the time.  They went to Urgent Care who referred him to [facility] for further evaluation.  He was seen in the ED and noted to have a pretty significant urinary tract infection on urinalysis.  Urine culture came back growing Enterococcus faecalis susceptible to ampicillin, so on day 3 of his stay I switched him over from Rocephin to ampicillin.  He has done well 24 hours after the switch to oral.  PT, OT has evaluated him.  He has pretty significant physical deconditioning, not very stable on his own.  He is requiring assist of 1-2 times, so recommendations have been for transitional care unit for further therapies and Social Work has found a bed for him today at [facility], so he should go later  this afternoon.   _______________________________________________________   Current issues are:  UTI (urinary tract infection) due to Enterococcus  As noted above  Continues on regimen of Ampicillin with ongoing N/V as noted below    Physical deconditioning  Generalized weakness  2/2 to hospitalization and noted diagnoses    Type 2 diabetes mellitus with hyperglycemia, unspecified long term insulin use status (H)  DM with Neuropathy  Chronic  A1c as noted above  On regimen of Byetta, Metformin and Glipizide  Last BG Levels:    AM:  129, 134    Noon:  164    Dinner:  138, 178    HS:  154, 151    Benign prostatic hyperplasia with lower urinary tract symptoms  Chronic  Urinating without difficulty  On regimen of Flomax    Atherosclerosis of coronary artery of native heart without angina pectoris, unspecified vessel or lesion type  Peripheral artery disease (H)  CVA  TIA  Chronic  Continues on regimen of Plavix, Lipitor and ASA 81  Denies CP today    Depression, unspecified depression type  Chronic  On regimen of Cymbalta and Amytriptyline    Hyperlipidemia, unspecified hyperlipidemia type  Chronic  Lipitor as noted above    Gastroesophageal reflux disease without esophagitis  Currently dealing with N/V as noted below  Chronic  On regimen of Omeprazole    Nausea  Vomiting  Since initiation of Ampicillin - has only 2 days remaining    Constipation  Continues on regimen of Miralalx    Hypertension  A. Fib  On regimen of Lisinopril and Metprolol  No anticoagulation at this time      CODE STATUS/ADVANCE DIRECTIVES DISCUSSION:   CPR/Full code   Patient's living condition: Significant Other    ALLERGIES:Oxycodone; Sulfa drugs; and Tetracycline  PAST MEDICAL HISTORY:  has a past medical history of Aortic root dilatation (H); Aortic stenosis; Benign essential hypertension (1/6/2017); Benign non-nodular prostatic hyperplasia with lower urinary tract symptoms (10/20/2016); CAD (coronary artery disease); Cardiomyopathy (H);  Carotid artery stenosis (10/20/2016); Carotid occlusion, left; Coronary artery disease involving native coronary artery of native heart without angina pectoris (10/20/2016); Diabetes mellitus (H); DJD (degenerative joint disease); Gastro-oesophageal reflux disease; Gastroesophageal reflux disease without esophagitis (10/20/2016); Heart attack; Hyperlipidemia; Hypertension; Mild cognitive impairment (10/20/2016); Nephrolithiasis; Osteopenia; PAD (peripheral artery disease) (H); Paroxysmal atrial fibrillation (H); PONV (postoperative nausea and vomiting); RBBB with left anterior fascicular block; and Unspecified cerebral artery occlusion with cerebral infarction. He also has no past medical history of Difficult intubation or Malignant hyperthermia.  PAST SURGICAL HISTORY:  has a past surgical history that includes Cholecystectomy; hernia repair; Abdomen surgery; Laser holmium lithotripsy ureter(s), insert stent, combined (3/2/2012); rotator cuff repair rt/lt; Esophagoscopy, gastroscopy, duodenoscopy (EGD), combined (N/A, 12/26/2016); UGI ENDOSCOPY W EUS (N/A, 12/29/2016); Amputate foot (Left, 6/4/2017); Incision and drainage foot, combined (Left, 6/6/2017); and Endarterectomy carotid (Right, 1/3/2018).  FAMILY HISTORY: family history includes Breast Cancer in his mother; Heart Failure (age of onset: 81) in his father.  SOCIAL HISTORY:  reports that he quit smoking about 19 years ago. His smoking use included Cigarettes. He has a 30.00 pack-year smoking history. He has never used smokeless tobacco. He reports that he drinks about 4.2 oz of alcohol per week  He reports that he does not use illicit drugs.    Post Discharge Medication Reconciliation Status: discharge medications reconciled, continue medications without change.  Current Outpatient Prescriptions   Medication Sig Dispense Refill     ondansetron (ZOFRAN) 4 MG tablet Take 4 mg by mouth 4 times daily as needed for nausea or vomiting       ampicillin (PRINCIPEN)  500 MG capsule Take 1 capsule (500 mg) by mouth every 6 hours for 4 days 16 capsule 0     Cholecalciferol 74278 UNITS TABS Take 1 tablet by mouth daily every Mon for deficiancy       acetaminophen (TYLENOL) 325 MG tablet Take 650 mg by mouth every 4 hours as needed for Pain for 3 Days Not to exceed 3 grams per 24 hours.       OMEPRAZOLE PO Take 40 mg by mouth daily as needed       exenatide (BYETTA) 5 mcg/0.02mL per dose (60 doses per 1.2 mL prefilled pen) Inject 5 mcg Subcutaneous 2 times daily (before meals) 1 Syringe 0     MAGNESIUM OXIDE PO Take 400 mg by mouth 2 times daily       polyethylene glycol (MIRALAX/GLYCOLAX) Packet Take 17 g by mouth daily as needed for constipation       LISINOPRIL PO Take 2.5 mg by mouth daily        metoprolol (TOPROL-XL) 25 MG 24 hr tablet Take 0.5 tablets (12.5 mg) by mouth daily       glipiZIDE (GLUCOTROL) 10 MG tablet Take 1 tablet (10 mg) by mouth 2 times daily (before meals) 180 tablet 3     AMITRIPTYLINE HCL PO Take 25 mg by mouth nightly as needed for sleep       ipratropium (ATROVENT) 0.03 % spray Spray 2 sprays into both nostrils 2 times daily as needed        DULoxetine (CYMBALTA) 30 MG EC capsule Take 1 capsule (30 mg) by mouth daily 90 capsule 3     tamsulosin (FLOMAX) 0.4 MG capsule Take 1 capsule (0.4 mg) by mouth daily 90 capsule 3     aspirin EC 81 MG EC tablet Take 1 tablet (81 mg) by mouth daily 30 tablet 0     METFORMIN HCL PO Take 1,000 mg by mouth 2 times daily (with meals)       ATORVASTATIN CALCIUM PO Take 40 mg by mouth At Bedtime        blood glucose monitoring (NO BRAND SPECIFIED) test strip Use to test blood sugar three times daily or as directed. 300 each 3     order for DME Equipment being ordered: True Matrix Blood Glucose meter. 1 Device 0     Clopidogrel Bisulfate, [id number], (PLAVIX PO) Take 75 mg by mouth daily          ROS:  10 point ROS of systems including Constitutional, Eyes, Respiratory, Cardiovascular, Gastroenterology, Genitourinary,  Integumentary, Muscularskeletal, Psychiatric were all negative except for pertinent positives noted in my HPI.    Exam:  /59  Pulse 69  Temp 98.2  F (36.8  C)  Resp 20  SpO2 96%  GENERAL APPEARANCE:  Alert, in no distress, appears healthy, oriented, cooperative, elderly male resting in bed  ENT:  Mouth and posterior oropharynx normal, moist mucous membranes, [place]  EYES:  EOM, conjunctivae, lids, pupils and irises normal  NECK:  No adenopathy,masses or thyromegaly  RESP:  respiratory effort and palpation of chest normal, lungs clear to auscultation , no respiratory distress  CV:  Palpation and auscultation of heart done , regular rate and rhythm, no murmur, rub, or gallop, no edema, +2 pedal pulses  ABDOMEN:  normal bowel sounds, soft, nontender, no hepatosplenomegaly or other masses  M/S:   Gait and station abnormal JAIME 2/2 patient being in bed  Digits and nails abnormal - arthritic changes present  SKIN:  Inspection of skin and subcutaneous tissue baseline, Palpation of skin and subcutaneous tissue baseline, skin thin and dry  NEURO:   Cranial nerves 2-12 are normal tested and grossly at patient's baseline  PSYCH:  oriented X 3, normal insight, judgement and memory, affect abnormal concerned - feeling ill     Lab/Diagnostic data:  CBC RESULTS:   Recent Labs   Lab Test  02/09/18   0831  02/07/18   2341   WBC  6.4  8.9   RBC  3.98*  4.80   HGB  12.0*  14.5   HCT  37.1*  44.5   MCV  93  93   MCH  30.2  30.2   MCHC  32.3  32.6   RDW  13.7  13.8   PLT  142*  222       Last Basic Metabolic Panel:  Recent Labs   Lab Test  02/09/18   0831  02/07/18   2341   NA  139  136   POTASSIUM  4.1  4.1   CHLORIDE  107  98   ALLISON  8.2*  9.5   CO2  26  28   BUN  10  19   CR  0.73  0.91   GLC  145*  179*       Liver Function Studies -   Recent Labs   Lab Test  02/07/18   2341  12/27/16   0650   PROTTOTAL  7.6  6.5*   ALBUMIN  4.0  2.8*   BILITOTAL  0.6  0.7   ALKPHOS  81  90   AST  18  43   ALT  30  50       TSH   Date Value Ref  Range Status   12/31/2017 2.16 0.40 - 4.00 mU/L Final   01/20/2017 3.12 0.40 - 4.00 mU/L Final   ]    Lab Results   Component Value Date    A1C 8.6 12/31/2017    A1C 8.9 12/14/2017       ASSESSMENT/PLAN:  Diagnosis Comments   UTI (urinary tract infection) due to Enterococcus  Continue ampicillin as ordered  Update NP is N/V worsens  CBC 1 week   Physical deconditioning  PT/OT eval/TX   Generalized weakness  Therapies as above   Atherosclerosis of coronary artery of native heart without angina pectoris, unspecified vessel or lesion type  Stable on current regimen; continue medications as currently ordered.     Peripheral artery disease (H)  Stable on current regimen; continue medications as currently ordered.   Depression, unspecified depression type  Continue medications as ordered  Patient not feeling well right now - monitor for increased depression   Hyperlipidemia, unspecified hyperlipidemia type  Stable on current regimen; continue medications as currently ordered.   Gastroesophageal reflux disease without esophagitis  Stable on current regimen; continue medications as currently ordered.   Nausea and vomiting, intractability of vomiting not specified, unspecified vomiting type  Zofran 4mg TID x3 days  F/u Friday to assess ongoing N/v after completion of abx  BMP 1 week   Slow transit constipation  Stable on current regimen; continue medications as currently ordered.   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   Cerebrovascular accident (CVA), unspecified mechanism (H)  Chronic  Noted   Transient cerebral ischemia, unspecified type  Noted   Benign essential hypertension  Stable on current regimen; continue medications as currently ordered.   Paroxysmal atrial fibrillation (H)  Stable on current regimen; continue medications as currently ordered.   Benign non-nodular prostatic hyperplasia with lower urinary tract symptoms  Stable on  current regimen; continue medications as currently ordered.     Electronically signed by:  ASPEN Naranjo CNP

## 2018-02-13 NOTE — LETTER
2/13/2018        RE: Dustin Pearce  [address]  [place] [address]        [facility]    PRIMARY CARE PROVIDER AND CLINIC:  Matt Morrissey [place] [address] [address] / [facility] [address]    Chief Complaint   Patient presents with     Hospital F/U       HPI:    Dustin Pearce is a 89 year old  (1/31/1928),admitted to the [facility] from [facility].  Hospital stay 2/7/2018 through 2/11/2018.  Admitted to this facility for  rehab, medical management and nursing care.  HPI information obtained from: facility chart records, facility staff, patient report and [facility] chart review.         HOSPITAL COURSE:  Mr. Pearce is a 89-year-old male.  He has a past medical history of paroxysmal AFib, coronary artery disease.  He had an MI in the 70s.  He is now on chronic Plavix and aspirin, also suffers from diabetes mellitus with an A1C of 8.9, longstanding history of cardiomyopathy, his ejection fraction about 40%.  This patient had been having increasing abdominal pain and generalized weakness for about a week and pain was more localized on the right side than the left, also some nausea, vomiting and diarrhea, reported to be having multiple falls at home.  He says he just feels like his legs are giving out on him all the time.  They went to Urgent Care who referred him to [facility] for further evaluation.  He was seen in the ED and noted to have a pretty significant urinary tract infection on urinalysis.  Urine culture came back growing Enterococcus faecalis susceptible to ampicillin, so on day 3 of his stay I switched him over from Rocephin to ampicillin.  He has done well 24 hours after the switch to oral.  PT, OT has evaluated him.  He has pretty significant physical deconditioning, not very stable on his own.  He is requiring assist of 1-2 times, so recommendations have been for transitional care unit for further  therapies and Social Work has found a bed for him today at [facility], so he should go later this afternoon.   _______________________________________________________   Current issues are:  UTI (urinary tract infection) due to Enterococcus  As noted above  Continues on regimen of Ampicillin with ongoing N/V as noted below    Physical deconditioning  Generalized weakness  2/2 to hospitalization and noted diagnoses    Type 2 diabetes mellitus with hyperglycemia, unspecified long term insulin use status (H)  DM with Neuropathy  Chronic  A1c as noted above  On regimen of Byetta, Metformin and Glipizide  Last BG Levels:    AM:  129, 134    Noon:  164    Dinner:  138, 178    HS:  154, 151    Benign prostatic hyperplasia with lower urinary tract symptoms  Chronic  Urinating without difficulty  On regimen of Flomax    Atherosclerosis of coronary artery of native heart without angina pectoris, unspecified vessel or lesion type  Peripheral artery disease (H)  CVA  TIA  Chronic  Continues on regimen of Plavix, Lipitor and ASA 81  Denies CP today    Depression, unspecified depression type  Chronic  On regimen of Cymbalta and Amytriptyline    Hyperlipidemia, unspecified hyperlipidemia type  Chronic  Lipitor as noted above    Gastroesophageal reflux disease without esophagitis  Currently dealing with N/V as noted below  Chronic  On regimen of Omeprazole    Nausea  Vomiting  Since initiation of Ampicillin - has only 2 days remaining    Constipation  Continues on regimen of Miralalx    Hypertension  A. Fib  On regimen of Lisinopril and Metprolol  No anticoagulation at this time      CODE STATUS/ADVANCE DIRECTIVES DISCUSSION:   CPR/Full code   Patient's living condition: Significant Other    ALLERGIES:Oxycodone; Sulfa drugs; and Tetracycline  PAST MEDICAL HISTORY:  has a past medical history of Aortic root dilatation (H); Aortic stenosis; Benign essential hypertension (1/6/2017); Benign non-nodular prostatic hyperplasia with  lower urinary tract symptoms (10/20/2016); CAD (coronary artery disease); Cardiomyopathy (H); Carotid artery stenosis (10/20/2016); Carotid occlusion, left; Coronary artery disease involving native coronary artery of native heart without angina pectoris (10/20/2016); Diabetes mellitus (H); DJD (degenerative joint disease); Gastro-oesophageal reflux disease; Gastroesophageal reflux disease without esophagitis (10/20/2016); Heart attack; Hyperlipidemia; Hypertension; Mild cognitive impairment (10/20/2016); Nephrolithiasis; Osteopenia; PAD (peripheral artery disease) (H); Paroxysmal atrial fibrillation (H); PONV (postoperative nausea and vomiting); RBBB with left anterior fascicular block; and Unspecified cerebral artery occlusion with cerebral infarction. He also has no past medical history of Difficult intubation or Malignant hyperthermia.  PAST SURGICAL HISTORY:  has a past surgical history that includes Cholecystectomy; hernia repair; Abdomen surgery; Laser holmium lithotripsy ureter(s), insert stent, combined (3/2/2012); rotator cuff repair rt/lt; Esophagoscopy, gastroscopy, duodenoscopy (EGD), combined (N/A, 12/26/2016); UGI ENDOSCOPY W EUS (N/A, 12/29/2016); Amputate foot (Left, 6/4/2017); Incision and drainage foot, combined (Left, 6/6/2017); and Endarterectomy carotid (Right, 1/3/2018).  FAMILY HISTORY: family history includes Breast Cancer in his mother; Heart Failure (age of onset: 81) in his father.  SOCIAL HISTORY:  reports that he quit smoking about 19 years ago. His smoking use included Cigarettes. He has a 30.00 pack-year smoking history. He has never used smokeless tobacco. He reports that he drinks about 4.2 oz of alcohol per week  He reports that he does not use illicit drugs.    Post Discharge Medication Reconciliation Status: discharge medications reconciled, continue medications without change.  Current Outpatient Prescriptions   Medication Sig Dispense Refill     ondansetron (ZOFRAN) 4 MG tablet  Take 4 mg by mouth 4 times daily as needed for nausea or vomiting       ampicillin (PRINCIPEN) 500 MG capsule Take 1 capsule (500 mg) by mouth every 6 hours for 4 days 16 capsule 0     Cholecalciferol 13654 UNITS TABS Take 1 tablet by mouth daily every Mon for deficiancy       acetaminophen (TYLENOL) 325 MG tablet Take 650 mg by mouth every 4 hours as needed for Pain for 3 Days Not to exceed 3 grams per 24 hours.       OMEPRAZOLE PO Take 40 mg by mouth daily as needed       exenatide (BYETTA) 5 mcg/0.02mL per dose (60 doses per 1.2 mL prefilled pen) Inject 5 mcg Subcutaneous 2 times daily (before meals) 1 Syringe 0     MAGNESIUM OXIDE PO Take 400 mg by mouth 2 times daily       polyethylene glycol (MIRALAX/GLYCOLAX) Packet Take 17 g by mouth daily as needed for constipation       LISINOPRIL PO Take 2.5 mg by mouth daily        metoprolol (TOPROL-XL) 25 MG 24 hr tablet Take 0.5 tablets (12.5 mg) by mouth daily       glipiZIDE (GLUCOTROL) 10 MG tablet Take 1 tablet (10 mg) by mouth 2 times daily (before meals) 180 tablet 3     AMITRIPTYLINE HCL PO Take 25 mg by mouth nightly as needed for sleep       ipratropium (ATROVENT) 0.03 % spray Spray 2 sprays into both nostrils 2 times daily as needed        DULoxetine (CYMBALTA) 30 MG EC capsule Take 1 capsule (30 mg) by mouth daily 90 capsule 3     tamsulosin (FLOMAX) 0.4 MG capsule Take 1 capsule (0.4 mg) by mouth daily 90 capsule 3     aspirin EC 81 MG EC tablet Take 1 tablet (81 mg) by mouth daily 30 tablet 0     METFORMIN HCL PO Take 1,000 mg by mouth 2 times daily (with meals)       ATORVASTATIN CALCIUM PO Take 40 mg by mouth At Bedtime        blood glucose monitoring (NO BRAND SPECIFIED) test strip Use to test blood sugar three times daily or as directed. 300 each 3     order for DME Equipment being ordered: True Matrix Blood Glucose meter. 1 Device 0     Clopidogrel Bisulfate, [id number], (PLAVIX PO) Take 75 mg by mouth daily          ROS:  10 point ROS of systems  including Constitutional, Eyes, Respiratory, Cardiovascular, Gastroenterology, Genitourinary, Integumentary, Muscularskeletal, Psychiatric were all negative except for pertinent positives noted in my HPI.    Exam:  /59  Pulse 69  Temp 98.2  F (36.8  C)  Resp 20  SpO2 96%  GENERAL APPEARANCE:  Alert, in no distress, appears healthy, oriented, cooperative, elderly male resting in bed  ENT:  Mouth and posterior oropharynx normal, moist mucous membranes, [place]  EYES:  EOM, conjunctivae, lids, pupils and irises normal  NECK:  No adenopathy,masses or thyromegaly  RESP:  respiratory effort and palpation of chest normal, lungs clear to auscultation , no respiratory distress  CV:  Palpation and auscultation of heart done , regular rate and rhythm, no murmur, rub, or gallop, no edema, +2 pedal pulses  ABDOMEN:  normal bowel sounds, soft, nontender, no hepatosplenomegaly or other masses  M/S:   Gait and station abnormal JAIME 2/2 patient being in bed  Digits and nails abnormal - arthritic changes present  SKIN:  Inspection of skin and subcutaneous tissue baseline, Palpation of skin and subcutaneous tissue baseline, skin thin and dry  NEURO:   Cranial nerves 2-12 are normal tested and grossly at patient's baseline  PSYCH:  oriented X 3, normal insight, judgement and memory, affect abnormal concerned - feeling ill     Lab/Diagnostic data:  CBC RESULTS:   Recent Labs   Lab Test  02/09/18   0831  02/07/18   2341   WBC  6.4  8.9   RBC  3.98*  4.80   HGB  12.0*  14.5   HCT  37.1*  44.5   MCV  93  93   MCH  30.2  30.2   MCHC  32.3  32.6   RDW  13.7  13.8   PLT  142*  222       Last Basic Metabolic Panel:  Recent Labs   Lab Test  02/09/18   0831  02/07/18   2341   NA  139  136   POTASSIUM  4.1  4.1   CHLORIDE  107  98   ALLISON  8.2*  9.5   CO2  26  28   BUN  10  19   CR  0.73  0.91   GLC  145*  179*       Liver Function Studies -   Recent Labs   Lab Test  02/07/18   2341  12/27/16   0650   PROTTOTAL  7.6  6.5*   ALBUMIN  4.0  2.8*    BILITOTAL  0.6  0.7   ALKPHOS  81  90   AST  18  43   ALT  30  50       TSH   Date Value Ref Range Status   12/31/2017 2.16 0.40 - 4.00 mU/L Final   01/20/2017 3.12 0.40 - 4.00 mU/L Final   ]    Lab Results   Component Value Date    A1C 8.6 12/31/2017    A1C 8.9 12/14/2017       ASSESSMENT/PLAN:  Diagnosis Comments   UTI (urinary tract infection) due to Enterococcus  Continue ampicillin as ordered  Update NP is N/V worsens  CBC 1 week   Physical deconditioning  PT/OT eval/TX   Generalized weakness  Therapies as above   Atherosclerosis of coronary artery of native heart without angina pectoris, unspecified vessel or lesion type  Stable on current regimen; continue medications as currently ordered.     Peripheral artery disease (H)  Stable on current regimen; continue medications as currently ordered.   Depression, unspecified depression type  Continue medications as ordered  Patient not feeling well right now - monitor for increased depression   Hyperlipidemia, unspecified hyperlipidemia type  Stable on current regimen; continue medications as currently ordered.   Gastroesophageal reflux disease without esophagitis  Stable on current regimen; continue medications as currently ordered.   Nausea and vomiting, intractability of vomiting not specified, unspecified vomiting type  Zofran 4mg TID x3 days  F/u Friday to assess ongoing N/v after completion of abx  BMP 1 week   Slow transit constipation  Stable on current regimen; continue medications as currently ordered.   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   Cerebrovascular accident (CVA), unspecified mechanism (H)  Chronic  Noted   Transient cerebral ischemia, unspecified type  Noted   Benign essential hypertension  Stable on current regimen; continue medications as currently ordered.   Paroxysmal atrial fibrillation (H)  Stable on current regimen; continue medications as currently  ordered.   Benign non-nodular prostatic hyperplasia with lower urinary tract symptoms  Stable on current regimen; continue medications as currently ordered.     Electronically signed by:  ASPEN Naranjo CNP                      Sincerely,        ASPEN Naranjo CNP

## 2018-02-13 NOTE — TELEPHONE ENCOUNTER
Reason for Call:  Other appointment    Detailed comments: pt significant other (deana) wants to know if pt should keep appt for 2/15. Pt was admitted 2/11 to TCU for OT and PT. Discharge date unknown. Please advise     Phone Number Patient can be reached at: Home number on file     Best Time: any    Can we leave a detailed message on this number? YES    Call taken on 2/13/2018 at 10:56 AM by Isael Mac

## 2018-02-13 NOTE — TELEPHONE ENCOUNTER
Please see patient message and advise as necessary    Sarah Purcell, RN  [employer]

## 2018-02-13 NOTE — TELEPHONE ENCOUNTER
Non detailed message left for patient to return call.   Sarah Purcell, RN  [employer]

## 2018-02-16 ENCOUNTER — RECORDS - HEALTHEAST (OUTPATIENT)
Dept: LAB | Facility: CLINIC | Age: 83
End: 2018-02-16

## 2018-02-16 ENCOUNTER — NURSING HOME VISIT (OUTPATIENT)
Dept: GERIATRICS | Facility: CLINIC | Age: 83
End: 2018-02-16
Payer: MEDICARE

## 2018-02-16 VITALS
RESPIRATION RATE: 18 BRPM | WEIGHT: 154 LBS | SYSTOLIC BLOOD PRESSURE: 130 MMHG | TEMPERATURE: 98.6 F | HEART RATE: 75 BPM | OXYGEN SATURATION: 94 % | DIASTOLIC BLOOD PRESSURE: 60 MMHG | BODY MASS INDEX: 25.63 KG/M2

## 2018-02-16 DIAGNOSIS — R11.2 NAUSEA AND VOMITING, INTRACTABILITY OF VOMITING NOT SPECIFIED, UNSPECIFIED VOMITING TYPE: Primary | ICD-10-CM

## 2018-02-16 LAB
ANION GAP SERPL CALCULATED.3IONS-SCNC: 10 MMOL/L (ref 5–18)
BUN SERPL-MCNC: 23 MG/DL (ref 8–28)
CALCIUM SERPL-MCNC: 9.8 MG/DL (ref 8.5–10.5)
CHLORIDE BLD-SCNC: 96 MMOL/L (ref 98–107)
CO2 SERPL-SCNC: 30 MMOL/L (ref 22–31)
CREAT SERPL-MCNC: 0.97 MG/DL (ref 0.6–1.1)
ERYTHROCYTE [DISTWIDTH] IN BLOOD BY AUTOMATED COUNT: 13.6 % (ref 11–14.5)
GFR SERPL CREATININE-BSD FRML MDRD: 54 ML/MIN/1.73M2
GLUCOSE BLD-MCNC: 178 MG/DL (ref 70–125)
HCT VFR BLD AUTO: 36.9 % (ref 35–47)
HGB BLD-MCNC: 11.6 G/DL (ref 12–16)
MCH RBC QN AUTO: 30.3 PG (ref 27–34)
MCHC RBC AUTO-ENTMCNC: 31.4 G/DL (ref 32–36)
MCV RBC AUTO: 96 FL (ref 80–100)
PLATELET # BLD AUTO: 190 THOU/UL (ref 140–440)
PMV BLD AUTO: 11.2 FL (ref 8.5–12.5)
POTASSIUM BLD-SCNC: 4.5 MMOL/L (ref 3.5–5)
RBC # BLD AUTO: 3.83 MILL/UL (ref 3.8–5.4)
SODIUM SERPL-SCNC: 136 MMOL/L (ref 136–145)
WBC: 7.5 THOU/UL (ref 4–11)

## 2018-02-16 PROCEDURE — 99308 SBSQ NF CARE LOW MDM 20: CPT | Performed by: NURSE PRACTITIONER

## 2018-02-16 NOTE — LETTER
2018        RE: Dustin Pearce  [address]  [facility] [address]        [facility]    Chief Complaint   Patient presents with     Nausea & Vomiting       HPI:    Dustin Pearce is a 89 year old  (1928), who is being seen today for an episodic care visit at [facility].    HPI information obtained from: facility chart records, facility staff and patient report. Today's concern is:  Nausea and vomiting, intractability of vomiting not specified, unspecified vomiting type  Patient's son present today concerned that father needs to go to the emergency department as he reports ongoing presence of nausea and vomiting which patient was previously treated for with scheduled Zofran ×3 days while finishing up regimen of ampicillin.  Per staff patient has not reported nausea and has not had any episodes of emesis for over 24 hours now.  Patient does note mild stomach upset and fatigue.  Spoke with patient and son about potential concern for dehydration given previous nausea and vomiting.  Originally ordered labs for Tuesday, will have them drawn stat today for peace of mind family and further assessment.    REVIEW OF SYSTEMS:  4 point ROS including Respiratory, CV, GI and , other than that noted in the HPI,  is negative    /60  Pulse 75  Temp 98.6  F (37  C)  Resp 18  Wt 154 lb (69.9 kg)  SpO2 94%  BMI 25.63 kg/m2  GENERAL APPEARANCE:  Alert, in no distress  Cardiovascular: negative, PMI normal. No lifts, heaves, or thrills. RRR. No murmurs, clicks gallops or rub, No edema or JVD.  Respiratory: negative, Percussion normal. Good diaphragmatic excursion. Lungs clear  Abdomen: Abdomen soft, non-tender. BS normal. No masses, organomegaly    BP Readin/57, 98/56, 106/59           HR:  63-92    ASSESSMENT/PLAN:  Nausea and vomiting, intractability of vomiting not specified, unspecified vomiting type  Stat BMP and CBC  Continue to going encourage p.o.  intake    Electronically signed by:  ASPEN Naranjo CNP        Sincerely,        ASPEN Naranjo CNP

## 2018-02-16 NOTE — PROGRESS NOTES
[facility]    Chief Complaint   Patient presents with     Nausea & Vomiting       HPI:    Dustin Pearce is a 89 year old  (1928), who is being seen today for an episodic care visit at [facility].    HPI information obtained from: facility chart records, facility staff and patient report. Today's concern is:  Nausea and vomiting, intractability of vomiting not specified, unspecified vomiting type  Patient's son present today concerned that father needs to go to the emergency department as he reports ongoing presence of nausea and vomiting which patient was previously treated for with scheduled Zofran ×3 days while finishing up regimen of ampicillin.  Per staff patient has not reported nausea and has not had any episodes of emesis for over 24 hours now.  Patient does note mild stomach upset and fatigue.  Spoke with patient and son about potential concern for dehydration given previous nausea and vomiting.  Originally ordered labs for Tuesday, will have them drawn stat today for peace of mind family and further assessment.    REVIEW OF SYSTEMS:  4 point ROS including Respiratory, CV, GI and , other than that noted in the HPI,  is negative    /60  Pulse 75  Temp 98.6  F (37  C)  Resp 18  Wt 154 lb (69.9 kg)  SpO2 94%  BMI 25.63 kg/m2  GENERAL APPEARANCE:  Alert, in no distress  Cardiovascular: negative, PMI normal. No lifts, heaves, or thrills. RRR. No murmurs, clicks gallops or rub, No edema or JVD.  Respiratory: negative, Percussion normal. Good diaphragmatic excursion. Lungs clear  Abdomen: Abdomen soft, non-tender. BS normal. No masses, organomegaly    BP Readin/57, 98/56, 106/59           HR:  63-92    ASSESSMENT/PLAN:  Nausea and vomiting, intractability of vomiting not specified, unspecified vomiting type  Stat BMP and CBC  Continue to going encourage p.o. intake    Electronically signed by:  ASPEN Naranjo CNP

## 2018-02-19 ENCOUNTER — RECORDS - HEALTHEAST (OUTPATIENT)
Dept: LAB | Facility: CLINIC | Age: 83
End: 2018-02-19

## 2018-02-19 ENCOUNTER — NURSING HOME VISIT (OUTPATIENT)
Dept: GERIATRICS | Facility: CLINIC | Age: 83
End: 2018-02-19
Payer: MEDICARE

## 2018-02-19 VITALS
OXYGEN SATURATION: 96 % | TEMPERATURE: 97.2 F | SYSTOLIC BLOOD PRESSURE: 106 MMHG | BODY MASS INDEX: 25.63 KG/M2 | WEIGHT: 154 LBS | DIASTOLIC BLOOD PRESSURE: 65 MMHG | RESPIRATION RATE: 18 BRPM | HEART RATE: 84 BPM

## 2018-02-19 DIAGNOSIS — B95.2 ENTEROCOCCUS UTI: ICD-10-CM

## 2018-02-19 DIAGNOSIS — N40.0 BENIGN PROSTATIC HYPERPLASIA WITHOUT LOWER URINARY TRACT SYMPTOMS: ICD-10-CM

## 2018-02-19 DIAGNOSIS — I73.9 PAD (PERIPHERAL ARTERY DISEASE) (H): ICD-10-CM

## 2018-02-19 DIAGNOSIS — E11.59 TYPE 2 DIABETES MELLITUS WITH OTHER CIRCULATORY COMPLICATION, WITHOUT LONG-TERM CURRENT USE OF INSULIN (H): ICD-10-CM

## 2018-02-19 DIAGNOSIS — I48.0 PAROXYSMAL ATRIAL FIBRILLATION (H): ICD-10-CM

## 2018-02-19 DIAGNOSIS — E78.00 PURE HYPERCHOLESTEROLEMIA: ICD-10-CM

## 2018-02-19 DIAGNOSIS — R11.0 NAUSEA: Primary | ICD-10-CM

## 2018-02-19 DIAGNOSIS — R53.81 PHYSICAL DECONDITIONING: ICD-10-CM

## 2018-02-19 DIAGNOSIS — I10 BENIGN ESSENTIAL HYPERTENSION: ICD-10-CM

## 2018-02-19 DIAGNOSIS — I25.5 ISCHEMIC CARDIOMYOPATHY: ICD-10-CM

## 2018-02-19 DIAGNOSIS — R41.89 COGNITIVE IMPAIRMENT: ICD-10-CM

## 2018-02-19 DIAGNOSIS — I25.10 CORONARY ARTERY DISEASE INVOLVING NATIVE CORONARY ARTERY OF NATIVE HEART WITHOUT ANGINA PECTORIS: ICD-10-CM

## 2018-02-19 DIAGNOSIS — N39.0 ENTEROCOCCUS UTI: ICD-10-CM

## 2018-02-19 DIAGNOSIS — F41.8 DEPRESSION WITH ANXIETY: ICD-10-CM

## 2018-02-19 PROCEDURE — 99306 1ST NF CARE HIGH MDM 50: CPT | Performed by: INTERNAL MEDICINE

## 2018-02-19 RX ORDER — BISACODYL 10 MG
10 SUPPOSITORY, RECTAL RECTAL DAILY PRN
COMMUNITY
End: 2020-01-01

## 2018-02-19 NOTE — PROGRESS NOTES
"[place] [facility]  INITIAL VISIT NOTE  February 19, 2018    PRIMARY CARE PROVIDER AND CLINIC:  Matt Morrissey [facility] [place] [id number] / [place]*    Chief Complaint   Patient presents with     Hospital F/U       HPI:    Dustin eParce is a 89 year old  (1/31/1928) male who was seen at [facility] on February 19, 2018 for an initial visit. Medical history is notable for CAD, ischemic cardiomyopathy (EF 40%), HTN, PAD, BPH and DM II. He was hospitalized at [facility] from 2/7/18 to 2/11/18 where he presented with weakness, falls and abdominal pain and was treated for an Enterococcus UTI. He was admitted to this facility for medical management and rehab.     Today, Mr. Pearce is seen in his room. He's had some nausea, he says for about 3 days. Reports no BM in 3+ days, but is passing flatus. No abdominal pain. No emesis or diarrhea. Had a small bowel of oatmeal for breakfast. No chest pain or dyspnea. No urinary symptoms. Working with therapies.     CODE STATUS:   CPR/Full code     ALLERGIES:     Allergies   Allergen Reactions     Oxycodone Other (See Comments)     \"TERRIBLE SWEATING\"     Sulfa Drugs      Tetracycline        PAST MEDICAL HISTORY:   Past Medical History:   Diagnosis Date     Aortic root dilatation (H)      Aortic stenosis      Benign essential hypertension 1/6/2017     Benign non-nodular prostatic hyperplasia with lower urinary tract symptoms 10/20/2016     CAD (coronary artery disease)     MI 1970     Cardiomyopathy (H)      Carotid artery stenosis 10/20/2016    chronically occluded left internal carotid artery     Carotid occlusion, left      Coronary artery disease involving native coronary artery of native heart without angina pectoris 10/20/2016     Diabetes mellitus (H)      DJD (degenerative joint disease)      Gastro-oesophageal reflux disease      Gastroesophageal reflux disease without esophagitis 10/20/2016     Heart attack      " Hyperlipidemia      Hypertension      Mild cognitive impairment 10/20/2016     Nephrolithiasis     RECURRENT     Osteopenia      PAD (peripheral artery disease) (H)     peripheral angiogram 5/2017: The common iliac artery stenoses were stented and the superficial femoral artery stenoses were angioplastied     Paroxysmal atrial fibrillation (H)      PONV (postoperative nausea and vomiting)      RBBB with left anterior fascicular block      Unspecified cerebral artery occlusion with cerebral infarction        PAST SURGICAL HISTORY:   Past Surgical History:   Procedure Laterality Date     AMPUTATE FOOT Left 6/4/2017    Procedure: AMPUTATE FOOT;  Sun Add#3: partial left foot amputation - Sagital Saw - Mini C-Arm;  Surgeon: Moe Kirk DPM;  Location:  OR     CHOLECYSTECTOMY       ENDARTERECTOMY CAROTID Right 1/3/2018    Procedure: ENDARTERECTOMY CAROTID;  RIGHT CAROTID ENDARTERECTOMY WITH EEG;  Surgeon: Roland Rodriguez MD;  Location: [facility]     ESOPHAGOSCOPY, GASTROSCOPY, DUODENOSCOPY (EGD), COMBINED N/A 12/26/2016    Procedure: COMBINED ESOPHAGOSCOPY, GASTROSCOPY, DUODENOSCOPY (EGD);  Surgeon: Nasim Louis MD;  Location:  GI     GENITOURINARY SURGERY      KIDNEY STONE REMOVAL X 4     HC UGI ENDOSCOPY W EUS N/A 12/29/2016    Procedure: COMBINED ENDOSCOPIC ULTRASOUND, ESOPHAGOSCOPY, GASTROSCOPY, DUODENOSCOPY (EGD);  Surgeon: Nasim Louis MD;  Location:  GI     HERNIA REPAIR       INCISION AND DRAINAGE FOOT, COMBINED Left 6/6/2017    Procedure: COMBINED INCISION AND DRAINAGE FOOT;  REVISIONAL LEFT FOOT INCISION AND DRAINAGE WITH POSSIBLE FLAP CLOSURE , ANTIBIOTIC SOLUTION ;  Surgeon: Nabil Ann DPM;  Location:  OR     LASER HOLMIUM LITHOTRIPSY URETER(S), INSERT STENT, COMBINED  3/2/2012    Procedure:COMBINED CYSTOSCOPY, URETEROSCOPY, LASER HOLMIUM LITHOTRIPSY URETER(S), INSERT STENT; CYSTOSCOPY, RIGHT RETROGRADES, RIGHT URETEROSCOPY, HOLMIUM LASER, RIGHT STENT  PLACEMENT; Surgeon:ANJELICA LEÓN; Location:[facility] OR     ROTATOR CUFF REPAIR RT/LT         FAMILY HISTORY:   Family History   Problem Relation Age of Onset     Breast Cancer Mother      Heart Failure Father 81     SOCIAL HISTORY:   Lives with spouse     MEDICATIONS:  Current Outpatient Prescriptions   Medication Sig Dispense Refill     bisacodyl (DULCOLAX) 10 MG Suppository Place 10 mg rectally daily as needed for constipation       Cholecalciferol 30845 UNITS TABS Take 1 tablet by mouth daily every Mon for deficiancy       OMEPRAZOLE PO Take 40 mg by mouth daily as needed       exenatide (BYETTA) 5 mcg/0.02mL per dose (60 doses per 1.2 mL prefilled pen) Inject 5 mcg Subcutaneous 2 times daily (before meals) 1 Syringe 0     MAGNESIUM OXIDE PO Take 400 mg by mouth 2 times daily       polyethylene glycol (MIRALAX/GLYCOLAX) Packet Take 17 g by mouth daily as needed for constipation       LISINOPRIL PO Take 2.5 mg by mouth daily        metoprolol (TOPROL-XL) 25 MG 24 hr tablet Take 0.5 tablets (12.5 mg) by mouth daily       glipiZIDE (GLUCOTROL) 10 MG tablet Take 1 tablet (10 mg) by mouth 2 times daily (before meals) 180 tablet 3     AMITRIPTYLINE HCL PO Take 25 mg by mouth nightly as needed for sleep       ipratropium (ATROVENT) 0.03 % spray Spray 2 sprays into both nostrils 2 times daily as needed        DULoxetine (CYMBALTA) 30 MG EC capsule Take 1 capsule (30 mg) by mouth daily 90 capsule 3     tamsulosin (FLOMAX) 0.4 MG capsule Take 1 capsule (0.4 mg) by mouth daily 90 capsule 3     aspirin EC 81 MG EC tablet Take 1 tablet (81 mg) by mouth daily 30 tablet 0     METFORMIN HCL PO Take 1,000 mg by mouth 2 times daily (with meals)       ATORVASTATIN CALCIUM PO Take 40 mg by mouth At Bedtime        blood glucose monitoring (NO BRAND SPECIFIED) test strip Use to test blood sugar three times daily or as directed. 300 each 3     order for DME Equipment being ordered: True Matrix Blood Glucose meter. 1 Device 0      Clopidogrel Bisulfate, [id number], (PLAVIX PO) Take 75 mg by mouth daily          Post Discharge Medication Reconciliation Status: medication reconcilation previously completed during another office visit.    ROS:  10 point ROS neg other than the symptoms noted above in the HPI.    PHYSICAL EXAM:  /65  Pulse 84  Temp 97.2  F (36.2  C)  Resp 18  Wt 154 lb (69.9 kg)  SpO2 96%  BMI 25.63 kg/m2  Gen: laying in bed, alert, cooperative and in no acute distress  HEENT: normocephalic; oropharynx clear; thyroid not enlarged  Card: RRR, S1, S2, no murmurs  Resp: lungs clear to auscultation bilaterally  GI: abdomen soft, not-tender, non-distended  MSK: normal muscle tone, no LE edema  Neuro: CX II-XII grossly in tact; ROM in all four extremities grossly in tact  Psych: alert and oriented to self and general situation; normal affect    LABORATORY/IMAGING DATA:  Reviewed as per Epic    ASSESSMENT/PLAN:    Nausea  Etiology not entirely clear. No associated emesis or diarrhea. No BM in 3+ days per patient. Could be secondary to constipation vs ?some diabetic gastroparesis.   -- add bisacodyl 10 mg supp daily PRN for constipation - nursing will administer one today  -- his omeprazole is PRN, could consider scheduling daily  -- has ondansetron PRN available   -- if this is persistent would be would a GI referral for eval and ?EGD    Enterococcus UTI, Resolved  No urinary sx. Completed antibiotics.   -- follow clinically    CAD / Ischemic Cardiomyopathy (EF 40%) / HTN / HLD  SBPs labile in 90s-130s, most 110s. Appears euvolemic on exam.   -- continues on ASA 81 mg daily, atorvasattin 40 mg qhs, clopidogrel 75 mg daily, lisinopril 2.5 mg daily and metoprolol XL 12.5 mg daily  -- follow BPs and adjust medications as needed     DM, Type II  Hgb A1c 8.9. Sugars at TCU 100s-160s  -- continues on exenatide 5 mcg subQ BID, glipizide 10 mg BID and metformin 1000 mg BID  -- follow sugars and adjust medications as  needed    Paroxysmal Atrial Fibrillation  HR 70s-80s. He is not on systemic anticoagulation.   -- rate controlled with metoprolol XL 12.5 mg daily    Depression / Anxiety  Mood and spirits good today  -- continues on duloxetine 30 mg daily  -- supportive cares     PAD  Chronic left ICA occlusions. He is s/p right CEA as well as angioplasty and stenting of left common iliac artery. Follows with Dr. Boykin.   -- continues on ASA 81 mg daily, atorvasattin 40 mg qhs, clopidogrel 75 mg daily    BPH  -- continues on tamsulosin 0.4 mg daily    Cognitive Impairment  SLUMS 19/30 during TCU stay last month.   -- OT following    Physical Deconditioning  In setting of hospitalization and underlying medical conditions  -- ongoing PT/OT      Electronically signed by:  Lesa Felipe MD

## 2018-02-20 ENCOUNTER — TRANSFERRED RECORDS (OUTPATIENT)
Dept: HEALTH INFORMATION MANAGEMENT | Facility: CLINIC | Age: 83
End: 2018-02-20

## 2018-02-20 LAB
ANION GAP SERPL CALCULATED.3IONS-SCNC: 6 MMOL/L (ref 5–18)
ANION GAP SERPL CALCULATED.3IONS-SCNC: 6 MMOL/L (ref 5–18)
BUN SERPL-MCNC: 16 MG/DL (ref 8–28)
BUN SERPL-MCNC: 16 MG/DL (ref 8–28)
CALCIUM SERPL-MCNC: 9.2 MG/DL (ref 8.5–10.5)
CALCIUM SERPL-MCNC: 9.2 MG/DL (ref 8.5–10.5)
CHLORIDE BLD-SCNC: 103 MMOL/L (ref 98–107)
CHLORIDE SERPLBLD-SCNC: 103 MMOL/L (ref 98–107)
CO2 SERPL-SCNC: 30 MMOL/L (ref 22–31)
CO2 SERPL-SCNC: 30 MMOL/L (ref 22–31)
CREAT SERPL-MCNC: 0.73 MG/DL (ref 0.6–1.1)
CREAT SERPL-MCNC: 0.73 MG/DL (ref 0.6–1.1)
ERYTHROCYTE [DISTWIDTH] IN BLOOD BY AUTOMATED COUNT: 13.4 % (ref 11–14.5)
ERYTHROCYTE [DISTWIDTH] IN BLOOD BY AUTOMATED COUNT: 13.4 % (ref 11–14.5)
GFR SERPL CREATININE-BSD FRML MDRD: >60 ML/MIN/1.73M2
GFR SERPL CREATININE-BSD FRML MDRD: >60 ML/MIN/1.73M2
GLUCOSE BLD-MCNC: 84 MG/DL (ref 70–125)
GLUCOSE SERPL-MCNC: 84 MG/DL (ref 70–125)
HCT VFR BLD AUTO: 36.3 % (ref 35–47)
HCT VFR BLD AUTO: 36.3 % (ref 35–47)
HEMOGLOBIN: 11.3 G/DL (ref 12–16)
HGB BLD-MCNC: 11.3 G/DL (ref 12–16)
MCH RBC QN AUTO: 29.9 PG (ref 27–34)
MCH RBC QN AUTO: 29.9 PG (ref 27–34)
MCHC RBC AUTO-ENTMCNC: 31.1 G/DL (ref 32–36)
MCHC RBC AUTO-ENTMCNC: 31.1 G/DL (ref 32–36)
MCV RBC AUTO: 96 FL (ref 80–100)
MCV RBC AUTO: 96 FL (ref 80–100)
PLATELET # BLD AUTO: 192 THOU/UL (ref 140–440)
PLATELET # BLD AUTO: 192 THOU/UL (ref 140–440)
PMV BLD AUTO: 10.6 FL (ref 8.5–12.5)
POTASSIUM BLD-SCNC: 4.6 MMOL/L (ref 3.5–5)
POTASSIUM SERPL-SCNC: 4.6 MMOL/L (ref 3.5–5)
RBC # BLD AUTO: 3.78 MILL/UL (ref 3.8–5.4)
RBC # BLD AUTO: 3.78 MILL/UL (ref 3.8–5.4)
SODIUM SERPL-SCNC: 139 MMOL/L (ref 136–145)
SODIUM SERPL-SCNC: 139 MMOL/L (ref 136–145)
WBC # BLD AUTO: 7.9 THOU/UL (ref 4–11)
WBC: 7.9 THOU/UL (ref 4–11)

## 2018-02-22 PROBLEM — E11.65 TYPE 2 DIABETES MELLITUS WITH HYPERGLYCEMIA, UNSPECIFIED LONG TERM INSULIN USE STATUS: Status: ACTIVE | Noted: 2018-02-22

## 2018-02-22 PROBLEM — B95.2 UTI (URINARY TRACT INFECTION) DUE TO ENTEROCOCCUS: Status: ACTIVE | Noted: 2018-02-22

## 2018-02-22 PROBLEM — N40.0 BENIGN PROSTATIC HYPERPLASIA WITHOUT LOWER URINARY TRACT SYMPTOMS: Status: ACTIVE | Noted: 2018-02-22

## 2018-02-22 PROBLEM — K59.01 SLOW TRANSIT CONSTIPATION: Status: ACTIVE | Noted: 2018-02-22

## 2018-02-22 PROBLEM — F32.A DEPRESSION, UNSPECIFIED DEPRESSION TYPE: Status: ACTIVE | Noted: 2018-02-22

## 2018-02-22 PROBLEM — I25.10 ATHEROSCLEROSIS OF CORONARY ARTERY OF NATIVE HEART WITHOUT ANGINA PECTORIS, UNSPECIFIED VESSEL OR LESION TYPE: Status: ACTIVE | Noted: 2018-02-22

## 2018-02-22 PROBLEM — R11.2 NAUSEA AND VOMITING, INTRACTABILITY OF VOMITING NOT SPECIFIED, UNSPECIFIED VOMITING TYPE: Status: ACTIVE | Noted: 2017-06-21

## 2018-02-22 PROBLEM — N39.0 UTI (URINARY TRACT INFECTION) DUE TO ENTEROCOCCUS: Status: ACTIVE | Noted: 2018-02-22

## 2018-02-22 PROBLEM — R53.1 GENERALIZED WEAKNESS: Status: ACTIVE | Noted: 2018-02-22

## 2018-02-23 ENCOUNTER — RECORDS - HEALTHEAST (OUTPATIENT)
Dept: LAB | Facility: CLINIC | Age: 83
End: 2018-02-23

## 2018-02-23 ENCOUNTER — TRANSFERRED RECORDS (OUTPATIENT)
Dept: HEALTH INFORMATION MANAGEMENT | Facility: CLINIC | Age: 83
End: 2018-02-23

## 2018-02-23 LAB — MAGNESIUM SERPL-MCNC: 1.5 MG/DL (ref 1.8–2.6)

## 2018-02-27 ENCOUNTER — DISCHARGE SUMMARY NURSING HOME (OUTPATIENT)
Dept: GERIATRICS | Facility: CLINIC | Age: 83
End: 2018-02-27
Payer: MEDICARE

## 2018-02-27 ENCOUNTER — CARE COORDINATION (OUTPATIENT)
Dept: CARE COORDINATION | Facility: CLINIC | Age: 83
End: 2018-02-27

## 2018-02-27 VITALS
DIASTOLIC BLOOD PRESSURE: 53 MMHG | SYSTOLIC BLOOD PRESSURE: 103 MMHG | BODY MASS INDEX: 25.53 KG/M2 | RESPIRATION RATE: 16 BRPM | WEIGHT: 153.4 LBS | TEMPERATURE: 97.3 F | OXYGEN SATURATION: 94 % | HEART RATE: 70 BPM

## 2018-02-27 DIAGNOSIS — G45.9 TRANSIENT CEREBRAL ISCHEMIA, UNSPECIFIED TYPE: ICD-10-CM

## 2018-02-27 DIAGNOSIS — B95.2 UTI (URINARY TRACT INFECTION) DUE TO ENTEROCOCCUS: ICD-10-CM

## 2018-02-27 DIAGNOSIS — R53.81 PHYSICAL DECONDITIONING: ICD-10-CM

## 2018-02-27 DIAGNOSIS — K21.9 GASTROESOPHAGEAL REFLUX DISEASE WITHOUT ESOPHAGITIS: ICD-10-CM

## 2018-02-27 DIAGNOSIS — F32.A DEPRESSION, UNSPECIFIED DEPRESSION TYPE: ICD-10-CM

## 2018-02-27 DIAGNOSIS — I48.0 PAROXYSMAL ATRIAL FIBRILLATION (H): ICD-10-CM

## 2018-02-27 DIAGNOSIS — I25.10 ATHEROSCLEROSIS OF CORONARY ARTERY OF NATIVE HEART WITHOUT ANGINA PECTORIS, UNSPECIFIED VESSEL OR LESION TYPE: ICD-10-CM

## 2018-02-27 DIAGNOSIS — M62.81 GENERALIZED MUSCLE WEAKNESS: ICD-10-CM

## 2018-02-27 DIAGNOSIS — I73.9 PERIPHERAL ARTERY DISEASE (H): ICD-10-CM

## 2018-02-27 DIAGNOSIS — E11.42 DM TYPE 2 WITH DIABETIC PERIPHERAL NEUROPATHY (H): ICD-10-CM

## 2018-02-27 DIAGNOSIS — R11.2 NAUSEA AND VOMITING, INTRACTABILITY OF VOMITING NOT SPECIFIED, UNSPECIFIED VOMITING TYPE: Primary | ICD-10-CM

## 2018-02-27 DIAGNOSIS — K59.01 SLOW TRANSIT CONSTIPATION: ICD-10-CM

## 2018-02-27 DIAGNOSIS — E11.65 TYPE 2 DIABETES MELLITUS WITH HYPERGLYCEMIA, UNSPECIFIED LONG TERM INSULIN USE STATUS: ICD-10-CM

## 2018-02-27 DIAGNOSIS — I63.9 CEREBROVASCULAR ACCIDENT (CVA), UNSPECIFIED MECHANISM (H): ICD-10-CM

## 2018-02-27 DIAGNOSIS — E78.5 HYPERLIPIDEMIA, UNSPECIFIED HYPERLIPIDEMIA TYPE: ICD-10-CM

## 2018-02-27 DIAGNOSIS — I10 BENIGN ESSENTIAL HYPERTENSION: ICD-10-CM

## 2018-02-27 DIAGNOSIS — N39.0 UTI (URINARY TRACT INFECTION) DUE TO ENTEROCOCCUS: ICD-10-CM

## 2018-02-27 PROCEDURE — 99316 NF DSCHRG MGMT 30 MIN+: CPT | Performed by: NURSE PRACTITIONER

## 2018-02-27 NOTE — PROGRESS NOTES
"  [place] [facility] DISCHARGE SUMMARY    PATIENT'S NAME: Dustin Pearce  YOB: 1928  MEDICAL RECORD NUMBER:  [id number]    PRIMARY CARE PROVIDER AND CLINIC RESPONSIBLE AFTER TRANSFER: Matt Morrissey [facility] [place] [address] / [place] [address]    CODE STATUS/ADVANCE DIRECTIVES DISCUSSION:   CPR/Full code        Allergies   Allergen Reactions     Oxycodone Other (See Comments)     \"TERRIBLE SWEATING\"     Sulfa Drugs      Tetracycline        TRANSFERRING PROVIDERS: ASPEN Eldridge CNP, Lesa Felipe MD  DATE OF SNF ADMISSION:  February / 11 / 2018  DATE OF SNF (anticipated) DISCHARGE: February / 28 / 2018  DISCHARGE DISPOSITION: Home   Nursing Facility: [facility] stay 2/7/2018 to 2/11/2018.     Condition on Discharge:  Improving.  Function: Resident independent with modified independence with ambulation 500 feet with front-wheeled walker, dressing, bathing and toileting; supervision required for grooming due to decreased vision  Cognitive Scores: CPT-5.2; short blessed - 8/28; BIMs - 15/15    Equipment: walker    DISCHARGE DIAGNOSIS:   1. Nausea and vomiting, intractability of vomiting not specified, unspecified vomiting type    2. Gastroesophageal reflux disease without esophagitis    3. Generalized muscle weakness    4. UTI (urinary tract infection) due to Enterococcus    5. Physical deconditioning    6. Type 2 diabetes mellitus with hyperglycemia, unspecified long term insulin use status (H)    7. Atherosclerosis of coronary artery of native heart without angina pectoris, unspecified vessel or lesion type    8. Peripheral artery disease (H)    9. Cerebrovascular accident (CVA), unspecified mechanism (H)    10. Transient cerebral ischemia, unspecified type    11. 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 Depression, unspecified depression type    13. Paroxysmal atrial fibrillation (H)  "   14. Benign essential hypertension    15. Hyperlipidemia, unspecified hyperlipidemia type    16. Slow transit constipation        HPI Nursing Facility Course:  HPI information obtained from: facility chart records, facility staff, patient report and [facility] chart review.  Nausea and vomiting, intractability of vomiting not specified, unspecified vomiting type  Gastroesophageal reflux disease without esophagitis  Secondary to ampicillin, did do better with scheduled Zofran on board if he should ever need ampicillin again in the future for recurrent infection  Resolved following completion of ampicillin,   Did continue with mild GI upset leading to increasing of omeprazole to 40mg BID  Primary care provider, these follow-up with patient regarding ongoing need    UTI (urinary tract infection) due to Enterococcus  Resolved  Completed regimen of Ampicillin with ongoing N/V as noted above   Follow-up with PCP for ongoing monitoring    Physical deconditioning  Generalized weakness  2/2 to hospitalization and noted diagnoses  Completed therapies while in TCU, does not feel he will need ongoing therapy at home - he has requested that he continues orders from PCP at follow-up appointment  Stable  Follow-up with PCP for ongoing    Type 2 diabetes mellitus with hyperglycemia, unspecified long term insulin use status (H)  DM with Neuropathy  Chronic  Lab Results   Component Value Date    A1C 8.6 12/31/2017    A1C 8.9 12/14/2017    A1C 8.7 12/01/2017    A1C 6.7 07/24/2017    A1C 8.3 04/21/2017   On regimen of Byetta, Metformin and Glipizide  Last BG Levels:    AM:  130, 98, 108, 115    Noon:  159, 115, 120, 149    Dinner:  136, 186, 91, 160  Stable on current regimen; continue medications as currently ordered.  Follow-up with PCP for ongoing monitoring and management    Benign prostatic hyperplasia with lower urinary tract symptoms  Chronic  Urinating without difficulty  On regimen of Flomax  Stable on current regimen;  continue medications as currently ordered.   Follow-up with PCP/urology for ongoing monitoring and management    Atherosclerosis of coronary artery of native heart without angina pectoris, unspecified vessel or lesion type  Peripheral artery disease (H)  CVA  TIA  Chronic  Continues on regimen of Plavix, Lipitor and ASA 81  Denies CP today  Stable on current regimen; continue medications as currently ordered.  Follow-up with PCP/cardiology for ongoing monitoring and management    Depression, unspecified depression type  Chronic  On regimen of Cymbalta and Amytriptyline  Noted depressive symptoms while in facility  Follow-up with PCP for reassessment of his symptoms following discharge  Continue medications as currently ordered    Hyperlipidemia, unspecified hyperlipidemia type  Chronic  Lipitor as noted above  Stable on current regimen; continue medications as currently ordered.  Follow-up with PCP for ongoing monitoring and management    Constipation  Continues on regimen of Miralalx  Bowel movements without difficulty  Stable on current regimen; continue medications as currently ordered.  Follow-up with PCP for interventions as needed    Hypertension  A. Fib  BP Readin/61, 113/56, 109/48               HR:  63-92  On regimen of Lisinopril and Metprolol  No anticoagulation at this time  Stable on current regimen; continue medications as currently ordered.  Follow-up with PCP for ongoing monitoring and management    PAST MEDICAL HISTORY:  has a past medical history of Aortic root dilatation (H); Aortic stenosis; Benign essential hypertension (2017); Benign non-nodular prostatic hyperplasia with lower urinary tract symptoms (10/20/2016); CAD (coronary artery disease); Cardiomyopathy (H); Carotid artery stenosis (10/20/2016); Carotid occlusion, left; Coronary artery disease involving native coronary artery of native heart without angina pectoris (10/20/2016); Diabetes mellitus (H); DJD (degenerative joint  disease); Gastro-oesophageal reflux disease; Gastroesophageal reflux disease without esophagitis (10/20/2016); Heart attack; Hyperlipidemia; Hypertension; Mild cognitive impairment (10/20/2016); Nephrolithiasis; Osteopenia; PAD (peripheral artery disease) (H); Paroxysmal atrial fibrillation (H); PONV (postoperative nausea and vomiting); RBBB with left anterior fascicular block; and Unspecified cerebral artery occlusion with cerebral infarction. He also has no past medical history of Difficult intubation or Malignant hyperthermia.    DISCHARGE MEDICATIONS:  Current Outpatient Prescriptions   Medication Sig Dispense Refill     ondansetron (ZOFRAN) 4 MG tablet Take 4 mg by mouth 4 times daily as needed for nausea       bisacodyl (DULCOLAX) 10 MG Suppository Place 10 mg rectally daily as needed for constipation       Cholecalciferol 37300 UNITS TABS Take 1 tablet by mouth daily every Sun for deficiancy       OMEPRAZOLE PO Take 40 mg by mouth 2 times daily        exenatide (BYETTA) 5 mcg/0.02mL per dose (60 doses per 1.2 mL prefilled pen) Inject 5 mcg Subcutaneous 2 times daily (before meals) 1 Syringe 0     MAGNESIUM OXIDE PO Take 400 mg by mouth 2 times daily       polyethylene glycol (MIRALAX/GLYCOLAX) Packet Take 17 g by mouth daily as needed for constipation       LISINOPRIL PO Take 2.5 mg by mouth daily        metoprolol (TOPROL-XL) 25 MG 24 hr tablet Take 0.5 tablets (12.5 mg) by mouth daily       glipiZIDE (GLUCOTROL) 10 MG tablet Take 1 tablet (10 mg) by mouth 2 times daily (before meals) 180 tablet 3     AMITRIPTYLINE HCL PO Take 25 mg by mouth nightly as needed for sleep       ipratropium (ATROVENT) 0.03 % spray Spray 2 sprays into both nostrils 2 times daily as needed        DULoxetine (CYMBALTA) 30 MG EC capsule Take 1 capsule (30 mg) by mouth daily 90 capsule 3     tamsulosin (FLOMAX) 0.4 MG capsule Take 1 capsule (0.4 mg) by mouth daily 90 capsule 3     aspirin EC 81 MG EC tablet Take 1 tablet (81 mg) by  mouth daily 30 tablet 0     METFORMIN HCL PO Take 1,000 mg by mouth 2 times daily (with meals)       ATORVASTATIN CALCIUM PO Take 40 mg by mouth At Bedtime        blood glucose monitoring (NO BRAND SPECIFIED) test strip Use to test blood sugar three times daily or as directed. 300 each 3     order for DME Equipment being ordered: True Matrix Blood Glucose meter. 1 Device 0     Clopidogrel Bisulfate, [id number], (PLAVIX PO) Take 75 mg by mouth daily          MEDICATION CHANGES/RATIONALE:   2/18: Zofran 4 mg tablet 4 times daily - Dx. nausea  2/23: Omeprazole 40 mg twice daily -  Dx.  GI upset  Controlled medications sent with patient:   not applicable/none     ROS:    10 point ROS of systems including Constitutional, Eyes, Respiratory, Cardiovascular, Gastroenterology, Genitourinary, Integumentary, Muscularskeletal, Psychiatric were all negative except for pertinent positives noted in my HPI.    Physical Exam:   Vitals: /53  Pulse 70  Temp 97.3  F (36.3  C)  Resp 16  Wt 153 lb 6.4 oz (69.6 kg)  SpO2 94%  BMI 25.53 kg/m2  BMI= Body mass index is 25.53 kg/(m^2).  GENERAL APPEARANCE:  Alert, in no distress, appears healthy, oriented, cooperative, elderly male resting in bed following lunch -he notes that he is prepared to head home soon  ENT:  Mouth and posterior oropharynx normal, moist mucous membranes, [place]  EYES:  EOM, conjunctivae, lids, pupils and irises normal  NECK:  No adenopathy, masses or thyromegaly  RESP:  rRspiratory effort and palpation of chest normal, lungs clear to auscultation, no respiratory distress  CV:  Palpation and auscultation of heart done , regular rate and rhythm, no murmur, rub, or gallop, no edema, +2 pedal pulses  ABDOMEN:  normal bowel sounds, soft, nontender, no hepatosplenomegaly or other masses  M/S:   Gait and station abnormal JAIME 2/2 patient being in bed; Digits and nails abnormal - arthritic changes present  SKIN:  Inspection of skin and subcutaneous tissue baseline,  Palpation of skin and subcutaneous tissue baseline, skin thin and dry  NEURO:   Cranial nerves 2-12 are normal tested and grossly at patient's baseline  PSYCH:  oriented X 3, normal insight, judgement and memory, affect normal       DISCHARGE PLAN:  Home with no services per patient request; states if he needs services he will follow-up with primary care provider  Patient instructed to follow-up with:  PCP in 7-10 days       [facility] scheduled appointments:  Future Appointments  Date Time Provider Department Center   3/16/2018 8:00 AM Cari Pires MD [facility]   4/19/2018 11:00 AM [id number]   4/19/2018 11:30 AM Roland Rodriguez MD [facility]       MTM referral needed and placed by this provider: No    Pending labs: None  SNF labs   CBC RESULTS:   Recent Labs   Lab Test 02/20/18 02/09/18   0831   WBC  7.9  6.4   RBC  3.78*  3.98*   HGB  11.3*  12.0*   HCT  36.3  37.1*   MCV  96  93   MCH  29.9  30.2   MCHC  31.1*  32.3   RDW  13.4  13.7   PLT  192  142*       Last Basic Metabolic Panel:  Recent Labs   Lab Test 02/20/18 02/09/18   0831   NA  139  139   POTASSIUM  4.6  4.1   CHLORIDE  103  107   ALLISON  9.2  8.2*   CO2  30  26   BUN  16  10   CR  0.73  0.73   GLC  84  145*       Liver Function Studies -   Recent Labs   Lab Test  02/07/18   2341  12/27/16   0650   PROTTOTAL  7.6  6.5*   ALBUMIN  4.0  2.8*   BILITOTAL  0.6  0.7   ALKPHOS  81  90   AST  18  43   ALT  30  50       TSH   Date Value Ref Range Status   12/31/2017 2.16 0.40 - 4.00 mU/L Final   01/20/2017 3.12 0.40 - 4.00 mU/L Final       Lab Results   Component Value Date    A1C 8.6 12/31/2017    A1C 8.9 12/14/2017         Discharge Treatments: None    TOTAL DISCHARGE TIME:   Greater than 30 minutes  Electronically signed by:  ASPEN Naranjo CNP

## 2018-02-27 NOTE — LETTER
"    2/27/2018        RE: Dustin Pearce  [address]  [facility] [address]          [place] [facility] DISCHARGE SUMMARY    PATIENT'S NAME: Dustin Pearce  YOB: 1928  MEDICAL RECORD NUMBER:  [id number]    PRIMARY CARE PROVIDER AND CLINIC RESPONSIBLE AFTER TRANSFER: Matt Morrissey [facility] [address] / [place] [address]    CODE STATUS/ADVANCE DIRECTIVES DISCUSSION:   CPR/Full code        Allergies   Allergen Reactions     Oxycodone Other (See Comments)     \"TERRIBLE SWEATING\"     Sulfa Drugs      Tetracycline        TRANSFERRING PROVIDERS: ASPEN Eldridge CNP, Lesa Felipe MD  DATE OF SNF ADMISSION:  February / 11 / 2018  DATE OF SNF (anticipated) DISCHARGE: February / 28 / 2018  DISCHARGE DISPOSITION: Home   Nursing Facility: [facility] stay 2/7/2018 to 2/11/2018.     Condition on Discharge:  Improving.  Function: Resident independent with modified independence with ambulation 500 feet with front-wheeled walker, dressing, bathing and toileting; supervision required for grooming due to decreased vision  Cognitive Scores: CPT-5.2; short blessed - 8/28; BIMs - 15/15    Equipment: walker    DISCHARGE DIAGNOSIS:   1. Nausea and vomiting, intractability of vomiting not specified, unspecified vomiting type    2. Gastroesophageal reflux disease without esophagitis    3. Generalized muscle weakness    4. UTI (urinary tract infection) due to Enterococcus    5. Physical deconditioning    6. Type 2 diabetes mellitus with hyperglycemia, unspecified long term insulin use status (H)    7. Atherosclerosis of coronary artery of native heart without angina pectoris, unspecified vessel or lesion type    8. Peripheral artery disease (H)    9. Cerebrovascular accident (CVA), unspecified mechanism (H)    10. Transient cerebral ischemia, unspecified type    11. 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 Depression, unspecified depression type    13. Paroxysmal atrial fibrillation (H)    14. Benign essential hypertension    15. Hyperlipidemia, unspecified hyperlipidemia type    16. Slow transit constipation        HPI Nursing Facility Course:  HPI information obtained from: facility chart records, facility staff, patient report and [facility] chart review.  Nausea and vomiting, intractability of vomiting not specified, unspecified vomiting type  Gastroesophageal reflux disease without esophagitis  Secondary to ampicillin, did do better with scheduled Zofran on board if he should ever need ampicillin again in the future for recurrent infection  Resolved following completion of ampicillin,   Did continue with mild GI upset leading to increasing of omeprazole to 40mg BID  Primary care provider, these follow-up with patient regarding ongoing need    UTI (urinary tract infection) due to Enterococcus  Resolved  Completed regimen of Ampicillin with ongoing N/V as noted above   Follow-up with PCP for ongoing monitoring    Physical deconditioning  Generalized weakness  2/2 to hospitalization and noted diagnoses  Completed therapies while in TCU, does not feel he will need ongoing therapy at home - he has requested that he continues orders from PCP at follow-up appointment  Stable  Follow-up with PCP for ongoing    Type 2 diabetes mellitus with hyperglycemia, unspecified long term insulin use status (H)  DM with Neuropathy  Chronic  Lab Results   Component Value Date    A1C 8.6 12/31/2017    A1C 8.9 12/14/2017    A1C 8.7 12/01/2017    A1C 6.7 07/24/2017    A1C 8.3 04/21/2017   On regimen of Byetta, Metformin and Glipizide  Last BG Levels:    AM:  130, 98, 108, 115    Noon:  159, 115, 120, 149    Dinner:  136, 186, 91, 160  Stable on current regimen; continue medications as currently ordered.  Follow-up with PCP for ongoing monitoring and management    Benign prostatic hyperplasia with lower urinary tract  symptoms  Chronic  Urinating without difficulty  On regimen of Flomax  Stable on current regimen; continue medications as currently ordered.   Follow-up with PCP/urology for ongoing monitoring and management    Atherosclerosis of coronary artery of native heart without angina pectoris, unspecified vessel or lesion type  Peripheral artery disease (H)  CVA  TIA  Chronic  Continues on regimen of Plavix, Lipitor and ASA 81  Denies CP today  Stable on current regimen; continue medications as currently ordered.  Follow-up with PCP/cardiology for ongoing monitoring and management    Depression, unspecified depression type  Chronic  On regimen of Cymbalta and Amytriptyline  Noted depressive symptoms while in facility  Follow-up with PCP for reassessment of his symptoms following discharge  Continue medications as currently ordered    Hyperlipidemia, unspecified hyperlipidemia type  Chronic  Lipitor as noted above  Stable on current regimen; continue medications as currently ordered.  Follow-up with PCP for ongoing monitoring and management    Constipation  Continues on regimen of Miralalx  Bowel movements without difficulty  Stable on current regimen; continue medications as currently ordered.  Follow-up with PCP for interventions as needed    Hypertension  A. Fib  BP Readin/61, 113/56, 109/48               HR:  63-92  On regimen of Lisinopril and Metprolol  No anticoagulation at this time  Stable on current regimen; continue medications as currently ordered.  Follow-up with PCP for ongoing monitoring and management    PAST MEDICAL HISTORY:  has a past medical history of Aortic root dilatation (H); Aortic stenosis; Benign essential hypertension (2017); Benign non-nodular prostatic hyperplasia with lower urinary tract symptoms (10/20/2016); CAD (coronary artery disease); Cardiomyopathy (H); Carotid artery stenosis (10/20/2016); Carotid occlusion, left; Coronary artery disease involving native coronary artery of  native heart without angina pectoris (10/20/2016); Diabetes mellitus (H); DJD (degenerative joint disease); Gastro-oesophageal reflux disease; Gastroesophageal reflux disease without esophagitis (10/20/2016); Heart attack; Hyperlipidemia; Hypertension; Mild cognitive impairment (10/20/2016); Nephrolithiasis; Osteopenia; PAD (peripheral artery disease) (H); Paroxysmal atrial fibrillation (H); PONV (postoperative nausea and vomiting); RBBB with left anterior fascicular block; and Unspecified cerebral artery occlusion with cerebral infarction. He also has no past medical history of Difficult intubation or Malignant hyperthermia.    DISCHARGE MEDICATIONS:  Current Outpatient Prescriptions   Medication Sig Dispense Refill     ondansetron (ZOFRAN) 4 MG tablet Take 4 mg by mouth 4 times daily as needed for nausea       bisacodyl (DULCOLAX) 10 MG Suppository Place 10 mg rectally daily as needed for constipation       Cholecalciferol 62158 UNITS TABS Take 1 tablet by mouth daily every Sun for deficiancy       OMEPRAZOLE PO Take 40 mg by mouth 2 times daily        exenatide (BYETTA) 5 mcg/0.02mL per dose (60 doses per 1.2 mL prefilled pen) Inject 5 mcg Subcutaneous 2 times daily (before meals) 1 Syringe 0     MAGNESIUM OXIDE PO Take 400 mg by mouth 2 times daily       polyethylene glycol (MIRALAX/GLYCOLAX) Packet Take 17 g by mouth daily as needed for constipation       LISINOPRIL PO Take 2.5 mg by mouth daily        metoprolol (TOPROL-XL) 25 MG 24 hr tablet Take 0.5 tablets (12.5 mg) by mouth daily       glipiZIDE (GLUCOTROL) 10 MG tablet Take 1 tablet (10 mg) by mouth 2 times daily (before meals) 180 tablet 3     AMITRIPTYLINE HCL PO Take 25 mg by mouth nightly as needed for sleep       ipratropium (ATROVENT) 0.03 % spray Spray 2 sprays into both nostrils 2 times daily as needed        DULoxetine (CYMBALTA) 30 MG EC capsule Take 1 capsule (30 mg) by mouth daily 90 capsule 3     tamsulosin (FLOMAX) 0.4 MG capsule Take 1  capsule (0.4 mg) by mouth daily 90 capsule 3     aspirin EC 81 MG EC tablet Take 1 tablet (81 mg) by mouth daily 30 tablet 0     METFORMIN HCL PO Take 1,000 mg by mouth 2 times daily (with meals)       ATORVASTATIN CALCIUM PO Take 40 mg by mouth At Bedtime        blood glucose monitoring (NO BRAND SPECIFIED) test strip Use to test blood sugar three times daily or as directed. 300 each 3     order for DME Equipment being ordered: True Matrix Blood Glucose meter. 1 Device 0     Clopidogrel Bisulfate, [id number], (PLAVIX PO) Take 75 mg by mouth daily          MEDICATION CHANGES/RATIONALE:   2/18: Zofran 4 mg tablet 4 times daily - Dx. nausea  2/23: Omeprazole 40 mg twice daily -  Dx.  GI upset  Controlled medications sent with patient:   not applicable/none     ROS:    10 point ROS of systems including Constitutional, Eyes, Respiratory, Cardiovascular, Gastroenterology, Genitourinary, Integumentary, Muscularskeletal, Psychiatric were all negative except for pertinent positives noted in my HPI.    Physical Exam:   Vitals: /53  Pulse 70  Temp 97.3  F (36.3  C)  Resp 16  Wt 153 lb 6.4 oz (69.6 kg)  SpO2 94%  BMI 25.53 kg/m2  BMI= Body mass index is 25.53 kg/(m^2).  GENERAL APPEARANCE:  Alert, in no distress, appears healthy, oriented, cooperative, elderly male resting in bed following lunch -he notes that he is prepared to head home soon  ENT:  Mouth and posterior oropharynx normal, moist mucous membranes, [place]  EYES:  EOM, conjunctivae, lids, pupils and irises normal  NECK:  No adenopathy, masses or thyromegaly  RESP:  rRspiratory effort and palpation of chest normal, lungs clear to auscultation, no respiratory distress  CV:  Palpation and auscultation of heart done , regular rate and rhythm, no murmur, rub, or gallop, no edema, +2 pedal pulses  ABDOMEN:  normal bowel sounds, soft, nontender, no hepatosplenomegaly or other masses  M/S:   Gait and station abnormal JAIME 2/2 patient being in bed; Digits and nails  abnormal - arthritic changes present  SKIN:  Inspection of skin and subcutaneous tissue baseline, Palpation of skin and subcutaneous tissue baseline, skin thin and dry  NEURO:   Cranial nerves 2-12 are normal tested and grossly at patient's baseline  PSYCH:  oriented X 3, normal insight, judgement and memory, affect normal       DISCHARGE PLAN:  Home with no services per patient request; states if he needs services he will follow-up with primary care provider  Patient instructed to follow-up with:  PCP in 7-10 days       [facility] scheduled appointments:  Future Appointments  Date Time Provider Department Center   3/16/2018 8:00 AM Cari Pires MD [facility]   4/19/2018 11:00 AM SH[facility]US1 [facility]   4/19/2018 11:30 AM Roland Rodriguez MD [facility]       MTM referral needed and placed by this provider: No    Pending labs: None  SNF labs   CBC RESULTS:   Recent Labs   Lab Test 02/20/18 02/09/18   0831   WBC  7.9  6.4   RBC  3.78*  3.98*   HGB  11.3*  12.0*   HCT  36.3  37.1*   MCV  96  93   MCH  29.9  30.2   MCHC  31.1*  32.3   RDW  13.4  13.7   PLT  192  142*       Last Basic Metabolic Panel:  Recent Labs   Lab Test 02/20/18 02/09/18   0831   NA  139  139   POTASSIUM  4.6  4.1   CHLORIDE  103  107   ALLISON  9.2  8.2*   CO2  30  26   BUN  16  10   CR  0.73  0.73   GLC  84  145*       Liver Function Studies -   Recent Labs   Lab Test  02/07/18   2341  12/27/16   0650   PROTTOTAL  7.6  6.5*   ALBUMIN  4.0  2.8*   BILITOTAL  0.6  0.7   ALKPHOS  81  90   AST  18  43   ALT  30  50       TSH   Date Value Ref Range Status   12/31/2017 2.16 0.40 - 4.00 mU/L Final   01/20/2017 3.12 0.40 - 4.00 mU/L Final       Lab Results   Component Value Date    A1C 8.6 12/31/2017    A1C 8.9 12/14/2017         Discharge Treatments: None    TOTAL DISCHARGE TIME:   Greater than 30 minutes  Electronically signed by:  ASPEN Naranjo CNP      Sincerely,        ASPEN Naranjo CNP

## 2018-02-27 NOTE — PROGRESS NOTES
Clinic Care Coordination Contact  Care Team Conversations    HUBER Quintero from TCU, called to report that patient is anticipated to d/c tomorrow to home.   Patient declined home PT/OT and outpatient PT/OT, communicating that he would prefer that he discuss it with his PCP first.     GI appt scheduled by TCU to determine cause of N/V.    03/16/18 at 8am with Pranay. Significant other Halina was adverse to going to GI at the [facility].   Discussed code status at TCU during care conference.  Patient expressed desire to stay Full code, indicated he may consider DNR/DNI down the road. Would like to discuss with PCP.   Therapies recommended patient have PT and OT upon d/c.   Ambulating with cane.   CPT 5.2/5.6   He is indep with ADLs, cooking concerns d/t decreased vision, however he only uses microwave per Halina.   Halina is living with patient and is there for support.  Will need to schedule f/u with PCP.     RN CCC to f/u with patient in 1-2 days once he is home.       Update 03/01/2018  Patient to schedule outpatient OT and PT.  Halina feels it is better for patient to be receiving home care out of the house as it will help his mood.    They feel well supported at this time. No recent falls. Using cane.   Halina encouraging patient to drink more water.  F/U with PCP has been scheduled for 03/12/18  No specific concerns/needs at this time.    Elvira Guerra, RN, BSN, PHN  Clinic Care Coordinator  [facility]  [phone]

## 2018-03-12 ENCOUNTER — OFFICE VISIT (OUTPATIENT)
Dept: FAMILY MEDICINE | Facility: CLINIC | Age: 83
End: 2018-03-12
Payer: MEDICARE

## 2018-03-12 ENCOUNTER — TELEPHONE (OUTPATIENT)
Dept: FAMILY MEDICINE | Facility: CLINIC | Age: 83
End: 2018-03-12

## 2018-03-12 VITALS
SYSTOLIC BLOOD PRESSURE: 123 MMHG | HEIGHT: 65 IN | TEMPERATURE: 98.1 F | DIASTOLIC BLOOD PRESSURE: 66 MMHG | WEIGHT: 156 LBS | BODY MASS INDEX: 25.99 KG/M2 | OXYGEN SATURATION: 95 % | HEART RATE: 69 BPM

## 2018-03-12 DIAGNOSIS — R13.10 DYSPHAGIA, UNSPECIFIED TYPE: ICD-10-CM

## 2018-03-12 DIAGNOSIS — R30.0 DYSURIA: ICD-10-CM

## 2018-03-12 DIAGNOSIS — N39.0 URINARY TRACT INFECTION WITHOUT HEMATURIA, SITE UNSPECIFIED: ICD-10-CM

## 2018-03-12 DIAGNOSIS — R10.13 ABDOMINAL PAIN, EPIGASTRIC: ICD-10-CM

## 2018-03-12 DIAGNOSIS — E11.42 DM TYPE 2 WITH DIABETIC PERIPHERAL NEUROPATHY (H): Primary | ICD-10-CM

## 2018-03-12 LAB
ALBUMIN UR-MCNC: NEGATIVE MG/DL
APPEARANCE UR: CLEAR
BACTERIA #/AREA URNS HPF: ABNORMAL /HPF
BILIRUB UR QL STRIP: NEGATIVE
COLOR UR AUTO: YELLOW
GLUCOSE UR STRIP-MCNC: NEGATIVE MG/DL
HGB UR QL STRIP: ABNORMAL
KETONES UR STRIP-MCNC: NEGATIVE MG/DL
LEUKOCYTE ESTERASE UR QL STRIP: ABNORMAL
NITRATE UR QL: NEGATIVE
NON-SQ EPI CELLS #/AREA URNS LPF: ABNORMAL /LPF
PH UR STRIP: 5.5 PH (ref 5–7)
RBC #/AREA URNS AUTO: ABNORMAL /HPF
SOURCE: ABNORMAL
SP GR UR STRIP: 1.02 (ref 1–1.03)
UROBILINOGEN UR STRIP-ACNC: 0.2 EU/DL (ref 0.2–1)
WBC #/AREA URNS AUTO: >100 /HPF

## 2018-03-12 PROCEDURE — 87086 URINE CULTURE/COLONY COUNT: CPT | Performed by: INTERNAL MEDICINE

## 2018-03-12 PROCEDURE — 87088 URINE BACTERIA CULTURE: CPT | Performed by: INTERNAL MEDICINE

## 2018-03-12 PROCEDURE — 81001 URINALYSIS AUTO W/SCOPE: CPT | Performed by: INTERNAL MEDICINE

## 2018-03-12 PROCEDURE — 99214 OFFICE O/P EST MOD 30 MIN: CPT | Performed by: INTERNAL MEDICINE

## 2018-03-12 PROCEDURE — 87186 SC STD MICRODIL/AGAR DIL: CPT | Performed by: INTERNAL MEDICINE

## 2018-03-12 RX ORDER — AMOXICILLIN 875 MG
875 TABLET ORAL 2 TIMES DAILY
Qty: 20 TABLET | Refills: 0 | Status: ON HOLD | OUTPATIENT
Start: 2018-03-12 | End: 2018-05-27

## 2018-03-12 NOTE — TELEPHONE ENCOUNTER
Per lab results CC'd to triage:     Matt Morrissey MD  P [facility] Triage                     Please call Dustin's significant other and inform her that he still has a UTI   I called in an prescription for amoxicillin to his pharmacy to treat it   I think he should see Marcela BEDOYA urology as soon as possible to find out why he has persistently infected urine   Dr. Morrissey

## 2018-03-12 NOTE — PROGRESS NOTES
Please call Dustin's significant other and inform her that he still has a UTI  I called in an prescription for amoxicillin to his pharmacy to treat it  I think he should see Marcela BEDOYA urology as soon as possible to find out why he has persistently infected urine   Dr. Morrissey

## 2018-03-12 NOTE — MR AVS SNAPSHOT
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 Medrano    Please be aware that coverage of these services is subject to the terms and limitations of your health insurance plan.  Call member services at your health plan with any benefit or coverage questions.      Please bring the following with you to your appointment:    (1) Any X-Rays, CTs or MRIs which have been performed.  Contact the facility where they were done to arrange for  prior to your scheduled appointment.    (2) List of current medications  (3) This referral request   (4) Any documents/labs given to you for this referral                  Follow-up notes from your care team     Return in about 3 weeks (around 4/2/2018) for Routine Visit.      Your next 10 appointments already scheduled     Apr 19, 2018 11:00 AM CDT   US CAROTID RIGHT with SHVUS1   [facility] ([facility])    [address]  [place] [address]   [phone]           Please bring a list of your medicines (including vitamins, minerals and over-the-counter drugs). Also, tell your doctor about any allergies you may have. Wear comfortable clothes and leave your valuables at home.  You do not need to do anything special to  "prepare for your exam.  Please call the Imaging Department at your exam site with any questions.            2018 11:30 AM CDT   Return Visit with Roland Rodriguez MD   [facility] ([facility])    [address]  [place] [address]   [phone]              Future tests that were ordered for you today     Open Future Orders        Priority Expected Expires Ordered    XR Esophagram w Upper GI Routine 3/12/2018 3/12/2019 3/12/2018            Who to contact     If you have questions or need follow up information about today's clinic visit or your schedule please contact [facility] directly at [phone].  Normal or non-critical lab and imaging results will be communicated to you by MyChart, letter or phone within 4 business days after the clinic has received the results. If you do not hear from us within 7 days, please contact the clinic through [employer] or phone. If you have a critical or abnormal lab result, we will notify you by phone as soon as possible.  Submit refill requests through [employer] or call your pharmacy and they will forward the refill request to us. Please allow 3 business days for your refill to be completed.          Additional Information About Your Visit        [employer] Information     [employer] lets you send messages to your doctor, view your test results, renew your prescriptions, schedule appointments and more. To sign up, go to [email or web address] . Click on \"Log in\" on the left side of the screen, which will take you to the Welcome page. Then click on \"Sign up Now\" on the right side of the page.     You will be asked to enter the access code listed below, as well as some personal information. Please follow the directions to create your username and password.     Your access code is: [email or web address]  Expires: 2018 12:54 PM     Your access code will  in 90 days. If you need " "help or a new code, please call your [place] clinic or [phone].        Care EveryWhere ID     This is your Care EveryWhere ID. This could be used by other organizations to access your [place] medical records  [id number]        Your Vitals Were     Pulse Temperature Height Pulse Oximetry BMI (Body Mass Index)       69 98.1  F (36.7  C) (Oral) 5' 5\" (1.651 m) 95% 25.96 kg/m2        Blood Pressure from Last 3 Encounters:   03/12/18 123/66   02/27/18 103/53   02/26/18 103/53    Weight from Last 3 Encounters:   03/12/18 156 lb (70.8 kg)   02/27/18 153 lb 6.4 oz (69.6 kg)   02/26/18 153 lb 6.4 oz (69.6 kg)              We Performed the Following     *UA reflex to Microscopic and Culture ([place] and [facility] (except [place] and [place])     UROLOGY ADULT REFERRAL        Primary Care Provider Office Phone # Fax #    Matt Morrissey [phone]       [facility] [address] [facility] [address]  [facility] [address]        Equal Access to Services     SIERRA JACOBSON : Hadii aad ku hadasho Sogilbertali, waaxda luqadaha, qaybta kaalmada adeegyada, maurice miranda. So [facility] [phone].    ATENCIÓN: Si habla español, tiene a garvey disposición servicios gratuitos de asistencia lingüística. Jose Luis al [phone].    We comply with applicable federal civil rights laws and [place] laws. We do not discriminate on the basis of race, color, national origin, age, disability, sex, sexual orientation, or gender identity.            Thank you!     Thank you for choosing [facility]  for your care. Our goal is always to provide you with excellent care. Hearing back from our patients is one way we can continue to improve our services. Please take a few minutes to complete the written survey that you may receive in the mail after your visit with us. Thank you!             Your Updated Medication List - Protect others around you: Learn how to safely use, store and throw away " your medicines at www.disposemymeds.org.          This list is accurate as of 3/12/18  2:44 PM.  Always use your most recent med list.                   Brand Name Dispense Instructions for use Diagnosis    AMITRIPTYLINE HCL PO      Take 25 mg by mouth nightly as needed for sleep        aspirin 81 MG EC tablet     30 tablet    Take 1 tablet (81 mg) by mouth daily    Transient cerebral ischemia, unspecified type       ATORVASTATIN CALCIUM PO      Take 40 mg by mouth At Bedtime        bisacodyl 10 MG Suppository    DULCOLAX     Place 10 mg rectally daily as needed for constipation        blood glucose monitoring test strip    no brand specified    300 each    Use to test blood sugar three times daily or as directed.    Hypoglycemia       Cholecalciferol 50901 UNITS Tabs      Take 1 tablet by mouth daily every Sun for deficiancy        DULoxetine 30 MG EC capsule    CYMBALTA    90 capsule    Take 1 capsule (30 mg) by mouth daily    Polyneuropathy associated with underlying disease (H)       exenatide 5 MCG/0.02ML Sopn injection    BYETTA    1 Syringe    Inject 5 mcg Subcutaneous 2 times daily (before meals)    Type 2 diabetes mellitus with other specified complication, without long-term current use of insulin (H)       glipiZIDE 10 MG tablet    GLUCOTROL    180 tablet    Take 1 tablet (10 mg) by mouth 2 times daily (before meals)    Type 2 diabetes mellitus with diabetic peripheral angiopathy without gangrene, without long-term current use of insulin (H)       ipratropium 0.03 % spray    ATROVENT     Spray 2 sprays into both nostrils 2 times daily as needed        LISINOPRIL PO      Take 2.5 mg by mouth daily        MAGNESIUM OXIDE PO      Take 400 mg by mouth 2 times daily        METFORMIN HCL PO      Take 1,000 mg by mouth 2 times daily (with meals)        metoprolol succinate 25 MG 24 hr tablet    TOPROL-XL     Take 0.5 tablets (12.5 mg) by mouth daily    Cardiomyopathy, unspecified type (H)       OMEPRAZOLE PO       Take 40 mg by mouth 2 times daily        order for DME     1 Device    Equipment being ordered: True Matrix Blood Glucose meter.    Type 2 diabetes mellitus without complication (H)       PLAVIX PO      Take 75 mg by mouth daily    Cough       polyethylene glycol Packet    MIRALAX/GLYCOLAX     Take 17 g by mouth daily as needed for constipation        tamsulosin 0.4 MG capsule    FLOMAX    90 capsule    Take 1 capsule (0.4 mg) by mouth daily    Benign non-nodular prostatic hyperplasia with lower urinary tract symptoms       ZOFRAN 4 MG tablet   Generic drug:  ondansetron      Take 4 mg by mouth 4 times daily as needed for nausea

## 2018-03-12 NOTE — NURSING NOTE
"Chief Complaint   Patient presents with     Hospital F/U       Initial /66 (BP Location: Right arm, Cuff Size: Adult Regular)  Pulse 69  Temp 98.1  F (36.7  C) (Oral)  Ht 5' 5\" (1.651 m)  Wt 156 lb (70.8 kg)  SpO2 95%  BMI 25.96 kg/m2 Estimated body mass index is 25.96 kg/(m^2) as calculated from the following:    Height as of this encounter: 5' 5\" (1.651 m).    Weight as of this encounter: 156 lb (70.8 kg).  Medication Reconciliation: complete   Amy Clement MA      "

## 2018-03-12 NOTE — PROGRESS NOTES
SUBJECTIVE:   Dustin Pearce is a 89 year old male who presents to clinic today for the following health issues:        Hospital Follow-up Visit:    Hospital/Nursing Home/ Rehab Facility: [facility]  Date of Admission: 2/7/2018  Date of Discharge: 2/28/2018  Reason(s) for Admission:     1.  Urinary tract infection with Enterococcus faecalis.   2.  Physical deconditioning and generalized weakness.   3.  Type 2 diabetes mellitus.   4.  Benign prostatic hypertrophy (BPH).   5.  Atherosclerotic coronary vascular disease.   6.  Peripheral artery disease.   7.  Depression.   8.  Hyperlipidemia   9.  Gastroesophageal reflux disease (GERD).             Problems taking medications regularly:  None       Medication changes since discharge: None       Problems adhering to non-medication therapy:  None    Summary of hospitalization:  [facility] discharge summary reviewed  Diagnostic Tests/Treatments reviewed.  Follow up needed: none  Other Healthcare Providers Involved in Patient s Care:         None  Update since discharge: improved.     Post Discharge Medication Reconciliation: discharge medications reconciled and changed, per note/orders (see AVS).  Plan of care communicated with patient     Coding guidelines for this visit:  Type of Medical   Decision Making Face-to-Face Visit       within 7 Days of discharge Face-to-Face Visit        within 14 days of discharge   Moderate Complexity 96704 [id number]   High Complexity 94010 [id number]            He feels improved since hospital and TCU discharge   However he still has moderate to severe dysuria without hematuria or nausea or vomiting   He had several intrarenal stones noted on CT in the ER  His sugars have been low and his SO has held his Byetta for low sugars since TCU discharge   However she continues to give him glipizide   He has no other specific complaints  He was to have an EGD to evaluate abdominal pain, but that  needed to be rescheduled due to his hospitalization   He describes dysphagia, no odynophagia , no weight loss, no blood in stools     Problem list and histories reviewed & adjusted, as indicated.  Additional history: as documented    Patient Active Problem List   Diagnosis     Coronary artery disease involving native coronary artery of native heart without angina pectoris     Mild cognitive impairment     Hyperlipidemia, unspecified hyperlipidemia type     Benign non-nodular prostatic hyperplasia with lower urinary tract symptoms     Gastroesophageal reflux disease without esophagitis     Cerebrovascular accident (H)     Carotid artery stenosis     Abdominal aortic aneurysm (H)     Lumbar back pain     Benign essential hypertension     TIA (transient ischemic attack)     Paroxysmal atrial fibrillation (H)     Ischemic cardiomyopathy     Aortic root dilatation (H)     Physical deconditioning     Diabetic ulcer of left foot associated with diabetes mellitus due to underlying condition (H)     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)     Anemia due to blood loss, acute     Diarrhea, unspecified type     Nausea and vomiting, intractability of vomiting not specified, unspecified vomiting type     Acute pain of left shoulder     Balance problems     Generalized muscle weakness     Peripheral artery disease (H)     Aortic stenosis     RBBB with left anterior fascicular block     Stroke of unknown etiology (H)     Carotid stenosis     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)     History of TIA (transient ischemic attack) and stroke     UTI (urinary tract infection)     UTI (urinary tract infection) due to Enterococcus     Generalized weakness     Type 2 diabetes mellitus with hyperglycemia, unspecified long term insulin use status (H)     Atherosclerosis of coronary artery of native heart without angina pectoris, unspecified vessel or lesion type     Depression, unspecified depression  type     Slow transit constipation     Past Surgical History:   Procedure Laterality Date     AMPUTATE FOOT Left 6/4/2017    Procedure: AMPUTATE FOOT;  Sun Add#3: partial left foot amputation - Sagital Saw - Mini C-Arm;  Surgeon: Moe Kirk DPM;  Location:  OR     CHOLECYSTECTOMY       ENDARTERECTOMY CAROTID Right 1/3/2018    Procedure: ENDARTERECTOMY CAROTID;  RIGHT CAROTID ENDARTERECTOMY WITH EEG;  Surgeon: Roland Rodriguez MD;  Location:  OR     ESOPHAGOSCOPY, GASTROSCOPY, DUODENOSCOPY (EGD), COMBINED N/A 12/26/2016    Procedure: COMBINED ESOPHAGOSCOPY, GASTROSCOPY, DUODENOSCOPY (EGD);  Surgeon: Nasim Louis MD;  Location:  GI     GENITOURINARY SURGERY      KIDNEY STONE REMOVAL X 4     HC UGI ENDOSCOPY W EUS N/A 12/29/2016    Procedure: COMBINED ENDOSCOPIC ULTRASOUND, ESOPHAGOSCOPY, GASTROSCOPY, DUODENOSCOPY (EGD);  Surgeon: Nasim Louis MD;  Location:  GI     HERNIA REPAIR       INCISION AND DRAINAGE FOOT, COMBINED Left 6/6/2017    Procedure: COMBINED INCISION AND DRAINAGE FOOT;  REVISIONAL LEFT FOOT INCISION AND DRAINAGE WITH POSSIBLE FLAP CLOSURE , ANTIBIOTIC SOLUTION ;  Surgeon: Nabil Ann DPM;  Location:  OR     LASER HOLMIUM LITHOTRIPSY URETER(S), INSERT STENT, COMBINED  3/2/2012    Procedure:COMBINED CYSTOSCOPY, URETEROSCOPY, LASER HOLMIUM LITHOTRIPSY URETER(S), INSERT STENT; CYSTOSCOPY, RIGHT RETROGRADES, RIGHT URETEROSCOPY, HOLMIUM LASER, RIGHT STENT PLACEMENT; Surgeon:ANJELICA LEÓN; Location: OR     ROTATOR CUFF REPAIR RT/LT         Social History   Substance Use Topics     Smoking status: Former Smoker     Packs/day: 1.00     Years: 30.00     Types: Cigarettes     Quit date: 1/1/1999     Smokeless tobacco: Never Used     Alcohol use 4.2 oz/week     7 Standard drinks or equivalent per week      Comment: 1 drink per week     Family History   Problem Relation Age of Onset     Breast Cancer Mother      Heart Failure Father 81      "    Current Outpatient Prescriptions   Medication Sig Dispense Refill     ondansetron (ZOFRAN) 4 MG tablet Take 4 mg by mouth 4 times daily as needed for nausea       bisacodyl (DULCOLAX) 10 MG Suppository Place 10 mg rectally daily as needed for constipation       Cholecalciferol 09069 UNITS TABS Take 1 tablet by mouth daily every Sun for deficiancy       OMEPRAZOLE PO Take 40 mg by mouth 2 times daily        exenatide (BYETTA) 5 mcg/0.02mL per dose (60 doses per 1.2 mL prefilled pen) Inject 5 mcg Subcutaneous 2 times daily (before meals) 1 Syringe 0     MAGNESIUM OXIDE PO Take 400 mg by mouth 2 times daily       polyethylene glycol (MIRALAX/GLYCOLAX) Packet Take 17 g by mouth daily as needed for constipation       LISINOPRIL PO Take 2.5 mg by mouth daily        metoprolol (TOPROL-XL) 25 MG 24 hr tablet Take 0.5 tablets (12.5 mg) by mouth daily       glipiZIDE (GLUCOTROL) 10 MG tablet Take 1 tablet (10 mg) by mouth 2 times daily (before meals) 180 tablet 3     AMITRIPTYLINE HCL PO Take 25 mg by mouth nightly as needed for sleep       ipratropium (ATROVENT) 0.03 % spray Spray 2 sprays into both nostrils 2 times daily as needed        DULoxetine (CYMBALTA) 30 MG EC capsule Take 1 capsule (30 mg) by mouth daily 90 capsule 3     tamsulosin (FLOMAX) 0.4 MG capsule Take 1 capsule (0.4 mg) by mouth daily 90 capsule 3     aspirin EC 81 MG EC tablet Take 1 tablet (81 mg) by mouth daily 30 tablet 0     METFORMIN HCL PO Take 1,000 mg by mouth 2 times daily (with meals)       ATORVASTATIN CALCIUM PO Take 40 mg by mouth At Bedtime        blood glucose monitoring (NO BRAND SPECIFIED) test strip Use to test blood sugar three times daily or as directed. 300 each 3     order for DME Equipment being ordered: True Matrix Blood Glucose meter. 1 Device 0     Clopidogrel Bisulfate, [id number], (PLAVIX PO) Take 75 mg by mouth daily        Allergies   Allergen Reactions     Oxycodone Other (See Comments)     \"TERRIBLE SWEATING\"     Sulfa " "Drugs      Tetracycline        Reviewed and updated as needed this visit by clinical staff       Reviewed and updated as needed this visit by Provider         ROS:  Constitutional, HEENT, cardiovascular, pulmonary, gi and gu systems are negative, except as otherwise noted.    OBJECTIVE:     /66 (BP Location: Right arm, Cuff Size: Adult Regular)  Pulse 69  Temp 98.1  F (36.7  C) (Oral)  Ht 5' 5\" (1.651 m)  Wt 156 lb (70.8 kg)  SpO2 95%  BMI 25.96 kg/m2  Body mass index is 25.96 kg/(m^2).  GENERAL: healthy, alert and no distress  CV: Heart with regular rate and rhythm.   ABDOMEN: soft, nontender, no hepatosplenomegaly, no masses and bowel sounds normal  NEURO: Very hard of hearing, more forgetful, Cranial nerves 2-12 appear grossly intact, walks with cane   PSYCH: Appropriate mood and affect, well groomed     Diagnostic Test Results:  Results for orders placed or performed in visit on 03/12/18   *UA reflex to Microscopic and Culture ([place] [facility] (except [place] and [place])   Result Value Ref Range    Color Urine Yellow     Appearance Urine Clear     Glucose Urine Negative NEG^Negative mg/dL    Bilirubin Urine Negative NEG^Negative    Ketones Urine Negative NEG^Negative mg/dL    Specific Gravity Urine 1.025 1.003 - 1.035    Blood Urine Small (A) NEG^Negative    pH Urine 5.5 5.0 - 7.0 pH    Protein Albumin Urine Negative NEG^Negative mg/dL    Urobilinogen Urine 0.2 0.2 - 1.0 EU/dL    Nitrite Urine Negative NEG^Negative    Leukocyte Esterase Urine Small (A) NEG^Negative    Source Midstream Urine    Urine Microscopic   Result Value Ref Range    WBC Urine >100 (A) OTO5^0 - 5 /HPF    RBC Urine O - 2 OTO2^O - 2 /HPF    Squamous Epithelial /LPF Urine Few FEW^Few /LPF    Bacteria Urine Few (A) NEG^Negative /HPF       ASSESSMENT/PLAN:     1. 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 Dysuria  Persistent dysuria despite treatment for UTI  Check UA and culture if needed  Return to urology, does he need cystoscopy to exclude bladder stone or other reason for infection/persitent voiding symptoms?  - *UA reflex to Microscopic and Culture ([place] and [facility] (except [place] and [place])  - UROLOGY ADULT REFERRAL  - Urine Culture Aerobic Bacterial    3. Urinary tract infection without hematuria, site unspecified      4. Abdominal pain, epigastric  Discussed below test vs EGD to evaluate symptoms  Below test less risk given advanced age and comorbidity  Schedule below; return in early April to discuss results   CT in ER did not show cause for abdominal symptoms     - XR Esophagram w Upper GI; Future    5. Dysphagia, unspecified type  See discussion above   - XR Esophagram w Upper GI; Future    FUTURE APPOINTMENTS:       - Follow-up visit in 3 weeks or sooner pending symptoms     Matt Morrissey MD  [facility]

## 2018-03-12 NOTE — LETTER
[address] [facility]  [address]  [place]  [address]  Tel: [phone]    March 19, 2018    Dustin Pearce  [address] [address] [facility] [address]        Dear Oneil Ramses,    The following letter pertains to your most recent diagnostic tests:     Your urine culture demonstrated another infection similar to that which landed you in the hospital recently.  The antibiotic that I prescribed should work for the infection, but the question is why are you getting all these infections?  You need to see Dr. Medrano to find out if it is your kidney stone problem again or some other reason.  Please make an appointment with him as soon as you can.      If you have any further questions or problems, please contact our office.      Sincerely,    Matt Morrissey MD/shaquille    Results for orders placed or performed in visit on 03/12/18   *UA reflex to Microscopic and Culture ([place] and [facility] (except [place] and [place])   Result Value Ref Range    Color Urine Yellow     Appearance Urine Clear     Glucose Urine Negative NEG^Negative mg/dL    Bilirubin Urine Negative NEG^Negative    Ketones Urine Negative NEG^Negative mg/dL    Specific Gravity Urine 1.025 1.003 - 1.035    Blood Urine Small (A) NEG^Negative    pH Urine 5.5 5.0 - 7.0 pH    Protein Albumin Urine Negative NEG^Negative mg/dL    Urobilinogen Urine 0.2 0.2 - 1.0 EU/dL    Nitrite Urine Negative NEG^Negative    Leukocyte Esterase Urine Small (A) NEG^Negative    Source Midstream Urine    Urine Microscopic   Result Value Ref Range    WBC Urine >100 (A) OTO5^0 - 5 /HPF    RBC Urine O - 2 OTO2^O - 2 /HPF    Squamous Epithelial /LPF Urine Few FEW^Few /LPF    Bacteria Urine Few (A) NEG^Negative /HPF   Urine Culture Aerobic Bacterial   Result Value Ref Range    Specimen Description Unspecified Urine     Culture Micro >100,000 colonies/mL  Enterococcus faecalis   (A)        Susceptibility    Enterococcus faecalis - ELVA     AMPICILLIN <=2 Sensitive  ug/mL     NITROFURANTOIN <=16 Sensitive ug/mL     PENICILLIN 4 Sensitive ug/mL     VANCOMYCIN 1 Sensitive ug/mL           Enclosure: Lab Results

## 2018-03-15 LAB
BACTERIA SPEC CULT: ABNORMAL
SPECIMEN SOURCE: ABNORMAL

## 2018-03-17 NOTE — PROGRESS NOTES
The following letter pertains to your most recent diagnostic tests:    Your urine culture demonstrated another infection similar to that which landed you in the hospital recently.  The antibiotic that I prescribed should work for the infection, but the question is why are you getting all these infections?  You need to see Dr. Medrano to find out if it is your kidney stone problem again or some other reason.  Please make an appointment with him as soon as you can.        Sincerely,    Dr. Morrissey

## 2018-03-26 ENCOUNTER — HOSPITAL ENCOUNTER (OUTPATIENT)
Dept: GENERAL RADIOLOGY | Facility: CLINIC | Age: 83
Discharge: HOME | End: 2018-03-26
Attending: INTERNAL MEDICINE | Admitting: INTERNAL MEDICINE
Payer: MEDICARE

## 2018-03-26 ENCOUNTER — OFFICE VISIT (OUTPATIENT)
Dept: UROLOGY | Facility: CLINIC | Age: 83
End: 2018-03-26
Payer: MEDICARE

## 2018-03-26 VITALS
BODY MASS INDEX: 25.99 KG/M2 | HEART RATE: 58 BPM | HEIGHT: 65 IN | WEIGHT: 156 LBS | DIASTOLIC BLOOD PRESSURE: 56 MMHG | SYSTOLIC BLOOD PRESSURE: 96 MMHG

## 2018-03-26 DIAGNOSIS — R31.9 URINARY TRACT INFECTION WITH HEMATURIA, SITE UNSPECIFIED: Primary | ICD-10-CM

## 2018-03-26 DIAGNOSIS — N39.0 URINARY TRACT INFECTION WITH HEMATURIA, SITE UNSPECIFIED: Primary | ICD-10-CM

## 2018-03-26 DIAGNOSIS — R10.13 ABDOMINAL PAIN, EPIGASTRIC: ICD-10-CM

## 2018-03-26 DIAGNOSIS — R13.10 DYSPHAGIA, UNSPECIFIED TYPE: ICD-10-CM

## 2018-03-26 LAB
ALBUMIN UR-MCNC: 30 MG/DL
APPEARANCE UR: ABNORMAL
BILIRUB UR QL STRIP: NEGATIVE
COLOR UR AUTO: YELLOW
GLUCOSE UR STRIP-MCNC: NEGATIVE MG/DL
HGB UR QL STRIP: NEGATIVE
KETONES UR STRIP-MCNC: ABNORMAL MG/DL
LEUKOCYTE ESTERASE UR QL STRIP: NEGATIVE
NITRATE UR QL: NEGATIVE
PH UR STRIP: 5.5 PH (ref 5–7)
SOURCE: ABNORMAL
SP GR UR STRIP: 1.02 (ref 1–1.03)
UROBILINOGEN UR STRIP-ACNC: 0.2 EU/DL (ref 0.2–1)

## 2018-03-26 PROCEDURE — 51798 US URINE CAPACITY MEASURE: CPT | Performed by: UROLOGY

## 2018-03-26 PROCEDURE — 74240 X-RAY XM UPR GI TRC 1CNTRST: CPT

## 2018-03-26 PROCEDURE — 81003 URINALYSIS AUTO W/O SCOPE: CPT | Performed by: UROLOGY

## 2018-03-26 PROCEDURE — 99203 OFFICE O/P NEW LOW 30 MIN: CPT | Mod: 25 | Performed by: UROLOGY

## 2018-03-26 ASSESSMENT — PAIN SCALES - GENERAL: PAINLEVEL: NO PAIN (0)

## 2018-03-26 NOTE — MR AVS SNAPSHOT
After Visit Summary   3/26/2018    Dustin Pearce    MRN: [id number]           Patient Information     Date Of Birth          1/31/1928        Visit Information        Provider Department      3/26/2018 10:50 AM Wilmar Medrano MD [facility] [place]        Today's Diagnoses     UTI (urinary tract infection)    -  1       Follow-ups after your visit        Follow-up notes from your care team     Return for cystoscopy.      Your next 10 appointments already scheduled     Mar 26, 2018  2:30 PM CDT   XR ESOPHAGRAM W UPPPER GI with [id number]   [facility] ([facility])    [address] [facility] [id number] [phone]           Please bring a list of your current medicines to your exam. (Include vitamins, minerals and over-the-counter medicines.) Leave your valuables at home.  Tell the doctor if there is a chance you could be pregnant.  Do not eat for 4 hours before the exam. Keep drinking clear liquids until 2 hours before the exam.  You may take pain medicine (with a sip of water) up to 4 hours before the exam.  Do not swallow any other medicines unless your doctor tells you to. Talk to your doctor to be sure it s safe to stop your medicines.  Please call the Imaging Department at your exam site with any questions.            Apr 11, 2018  4:00 PM CDT   Office Visit with Matt Morrissey MD   [facility] ([facility])    [address] [facility] [id number] [phone]           Bring a current list of meds and any records pertaining to this visit. For Physicals, please bring immunization records and any forms needing to be filled out. Please arrive 10 minutes early to complete paperwork.            Apr 19, 2018 11:00 AM CDT   US CAROTID RIGHT with [email or web address]   [facility] ([facility])    [address]  [place] [address]  "  [phone]           Please bring a list of your medicines (including vitamins, minerals and over-the-counter drugs). Also, tell your doctor about any allergies you may have. Wear comfortable clothes and leave your valuables at home.  You do not need to do anything special to prepare for your exam.  Please call the Imaging Department at your exam site with any questions.            Apr 19, 2018 11:30 AM CDT   Return Visit with Roland Rodriguez MD   [facility] ([facility])    [address]  [facility] [address] [phone]            Apr 25, 2018  8:00 AM CDT   Cystoscopy with Wilmar Medrano MD, [facility] [place] ([facility])    [address]  [address]  [facility] [address] [phone]              Who to contact     If you have questions or need follow up information about today's clinic visit or your schedule please contact [facility] [place] directly at [phone].  Normal or non-critical lab and imaging results will be communicated to you by MyChart, letter or phone within 4 business days after the clinic has received the results. If you do not hear from us within 7 days, please contact the clinic through Connequityhart or phone. If you have a critical or abnormal lab result, we will notify you by phone as soon as possible.  Submit refill requests through [email or web address] or call your pharmacy and they will forward the refill request to us. Please allow 3 business days for your refill to be completed.          Additional Information About Your Visit        Connequityhart Information     [email or web address] lets you send messages to your doctor, view your test results, renew your prescriptions, schedule appointments and more. To sign up, go to [email or web address] . Click on \"Log in\" on the left side of the screen, which will take you to the " "Welcome page. Then click on \"Sign up Now\" on the right side of the page.     You will be asked to enter the access code listed below, as well as some personal information. Please follow the directions to create your username and password.     Your access code is: [email or web address]  Expires: 2018 12:54 PM     Your access code will  in 90 days. If you need help or a new code, please call your [facility] or [phone].        Care EveryWhere ID     This is your Care EveryWhere ID. This could be used by other organizations to access your [place] medical records  [id number]        Your Vitals Were     Pulse Height BMI (Body Mass Index)             58 1.651 m (5' 5\") 25.96 kg/m2          Blood Pressure from Last 3 Encounters:   18 96/56   18 123/66   18 103/53    Weight from Last 3 Encounters:   18 70.8 kg (156 lb)   18 70.8 kg (156 lb)   18 69.6 kg (153 lb 6.4 oz)              We Performed the Following     UA without Microscopic        Primary Care Provider Office Phone # Fax #    Matt Morrissey [phone]       [facility] [address] [address]        Equal Access to Services     LAURENCE JACOBSON : Hadii aad ku hadasho Sogilbertali, waaxda luqadaha, qaybta kaalmada kofi, maurice gama . So [facility] [phone].    ATENCIÓN: Si habla español, tiene a garvey disposición servicios gratuitos de asistencia lingüística. Jose Luis al [phone].    We comply with applicable federal civil rights laws and [place] laws. We do not discriminate on the basis of race, color, national origin, age, disability, sex, sexual orientation, or gender identity.            Thank you!     Thank you for choosing [facility]  for your care. Our goal is always to provide you with excellent care. Hearing back from our patients is one way we can continue to improve our services. Please take a " few minutes to complete the written survey that you may receive in the mail after your visit with us. Thank you!             Your Updated Medication List - Protect others around you: Learn how to safely use, store and throw away your medicines at www.disposemymeds.org.          This list is accurate as of 3/26/18 11:22 AM.  Always use your most recent med list.                   Brand Name Dispense Instructions for use Diagnosis    AMITRIPTYLINE HCL PO      Take 25 mg by mouth nightly as needed for sleep        amoxicillin 875 MG tablet    AMOXIL    20 tablet    Take 1 tablet (875 mg) by mouth 2 times daily    Urinary tract infection without hematuria, site unspecified       aspirin 81 MG EC tablet     30 tablet    Take 1 tablet (81 mg) by mouth daily    Transient cerebral ischemia, unspecified type       ATORVASTATIN CALCIUM PO      Take 40 mg by mouth At Bedtime        bisacodyl 10 MG Suppository    DULCOLAX     Place 10 mg rectally daily as needed for constipation        blood glucose monitoring test strip    no brand specified    300 each    Use to test blood sugar three times daily or as directed.    Hypoglycemia       Cholecalciferol 80595 UNITS Tabs      Take 1 tablet by mouth daily every Sun for deficiancy        DULoxetine 30 MG EC capsule    CYMBALTA    90 capsule    Take 1 capsule (30 mg) by mouth daily    Polyneuropathy associated with underlying disease (H)       exenatide 5 MCG/0.02ML Sopn injection    BYETTA    1 Syringe    Inject 5 mcg Subcutaneous 2 times daily (before meals)    Type 2 diabetes mellitus with other specified complication, without long-term current use of insulin (H)       glipiZIDE 10 MG tablet    GLUCOTROL    180 tablet    Take 1 tablet (10 mg) by mouth 2 times daily (before meals)    Type 2 diabetes mellitus with diabetic peripheral angiopathy without gangrene, without long-term current use of insulin (H)       ipratropium 0.03 % spray    ATROVENT     Spray 2 sprays into both  nostrils 2 times daily as needed        LISINOPRIL PO      Take 2.5 mg by mouth daily        MAGNESIUM OXIDE PO      Take 400 mg by mouth 2 times daily        METFORMIN HCL PO      Take 1,000 mg by mouth 2 times daily (with meals)        metoprolol succinate 25 MG 24 hr tablet    TOPROL-XL     Take 0.5 tablets (12.5 mg) by mouth daily    Cardiomyopathy, unspecified type (H)       OMEPRAZOLE PO      Take 40 mg by mouth 2 times daily        order for DME     1 Device    Equipment being ordered: True Matrix Blood Glucose meter.    Type 2 diabetes mellitus without complication (H)       PLAVIX PO      Take 75 mg by mouth daily    Cough       polyethylene glycol Packet    MIRALAX/GLYCOLAX     Take 17 g by mouth daily as needed for constipation        tamsulosin 0.4 MG capsule    FLOMAX    90 capsule    Take 1 capsule (0.4 mg) by mouth daily    Benign non-nodular prostatic hyperplasia with lower urinary tract symptoms       ZOFRAN 4 MG tablet   Generic drug:  ondansetron      Take 4 mg by mouth 4 times daily as needed for nausea

## 2018-03-26 NOTE — LETTER
[facility]  [address] [address]  [address]  [place]  [address]  Tel: [phone]    March 27, 2018    Dustin ALEJANDRA Ramses  [address] [address] [address]        Dear Mr. Pearce,    The following letter pertains to your most recent diagnostic tests:    Good news! Your barium swallow test did NOT show any dangerous blockages in the esophagus to explain you swallowing problems.  Please return to clinic if your symptoms are persistent.      If you have any further questions or problems, please contact our office.      Sincerely,    Matt [place]sey MD/shaquille    Results for orders placed or performed during the hospital encounter of 03/26/18   XR Esophagram w Upper GI    Narrative    ESOPHAGRAM WITH UPPER GI March 26, 2018 1:36 PM     HISTORY: Abdominal pain, epigastric. Dysphagia, unspecified type.    COMPARISON: None.    FLUOROSCOPY TIME: 2.5 minutes.    SPOT FILMS: Twelve.    TECHNIQUE: Double contrast upper GI.    FINDINGS: A single contrast esophagram with upper GI was performed.  Minimal esophageal dysmotility, particularly for age. No strictures.  The esophagus, stomach, and visualized duodenum are otherwise  unremarkable.      Impression    IMPRESSION: Negative upper GI and esophagram.    ANJELICA CABRERA MD           Enclosure: Lab Results

## 2018-03-26 NOTE — LETTER
3/26/2018       RE: Dustin Pearce  [address]  [place] [facility] [address]     Dear Colleague,    Thank you for referring your patient, Dustin Pearce, to the [facility] [place] [facility]. Please see a copy of my visit note below.    History: It is a great pleasure to see this very pleasant 89-year-old gentleman for the first time in many years at the request of Dr. Morrissey.  He has had what appeared to be either a persistent urinary tract infections or 2 separate urinary tract infections with Enterococcus faecalis over the period of the last 6 weeks.  This now appears to have been effectively treated as the urinalysis today is negative.  His postvoid residual is only 20 cc.  He does have a past history of urinary stone disease, having had a ureteroscopic stone extraction on the right side and a bladder stone removal in 2012.  We have not seen him since that time.  A CT scan was performed recently.    CT ABDOMEN PELVIS W CONTRAST  2/8/2018 1:49 AM      HISTORY: Right-sided abdominal pain.     TECHNIQUE: CT abdomen and pelvis with 81mL Isovue-370 IV. Radiation  dose for this scan was reduced using automated exposure control,  adjustment of the mA and/or kV according to patient size, or iterative  reconstruction technique.     COMPARISON: 12/25/2016.     FINDINGS:  Abdomen: Mild fibrosis at the lung bases. The liver, spleen, pancreas  and adrenal glands are normal in appearance. The gallbladder is  absent. There are a few stones within both kidneys. No ureteral stone  or hydronephrosis. There is a 5.5 cm cyst at the upper pole of the  left kidney. There is no abdominal or pelvic lymph node enlargement.  There is atherosclerotic calcification of the aorta and its branches.  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     Pelvis: There are colonic diverticula without acute diverticulitis.  No  bowel obstruction or inflammation. The appendix is normal. No free  intraperitoneal gas or fluid. 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  Bilateral L5 pars interarticularis defects.         IMPRESSION:  1. No acute abnormality. No bowel obstruction or inflammation.  2. Colonic diverticula without acute diverticulitis.  3. Bilateral intrarenal stones. No ureteral stone or hydronephrosis.     WILFRED SALAZAR MD         Currently he has no other major symptoms, has not observed blood in the urine although blood was seen in the urine at the time of urine culture in February of this year.  He has no other major complaints.  I have reviewed his medications and his previous records    Past Medical History:   Diagnosis Date     Aortic root dilatation (H)      Aortic stenosis      Benign essential hypertension 1/6/2017     Benign non-nodular prostatic hyperplasia with lower urinary tract symptoms 10/20/2016     CAD (coronary artery disease)     MI 1970     Cardiomyopathy (H)      Carotid artery stenosis 10/20/2016    chronically occluded left internal carotid artery     Carotid occlusion, left      Coronary artery disease involving native coronary artery of native heart without angina pectoris 10/20/2016     Diabetes mellitus (H)      DJD (degenerative joint disease)      Gastro-oesophageal reflux disease      Gastroesophageal reflux disease without esophagitis 10/20/2016     Heart attack      Hyperlipidemia      Hypertension      Mild cognitive impairment 10/20/2016     Nephrolithiasis     RECURRENT     Osteopenia      PAD (peripheral artery disease) (H)     peripheral angiogram 5/2017: The common iliac artery stenoses were stented and the superficial femoral artery stenoses were angioplastied     Paroxysmal atrial fibrillation (H)      PONV (postoperative nausea and vomiting)      RBBB with left anterior fascicular block      Unspecified cerebral artery occlusion with cerebral infarction        Past Surgical History:    Procedure Laterality Date     AMPUTATE FOOT Left 6/4/2017    Procedure: AMPUTATE FOOT;  Sun Add#3: partial left foot amputation - Sagital Saw - Mini C-Arm;  Surgeon: Moe Kirk DPM;  Location:  OR     CHOLECYSTECTOMY       ENDARTERECTOMY CAROTID Right 1/3/2018    Procedure: ENDARTERECTOMY CAROTID;  RIGHT CAROTID ENDARTERECTOMY WITH EEG;  Surgeon: Roland Rodriguez MD;  Location:  OR     ESOPHAGOSCOPY, GASTROSCOPY, DUODENOSCOPY (EGD), COMBINED N/A 12/26/2016    Procedure: COMBINED ESOPHAGOSCOPY, GASTROSCOPY, DUODENOSCOPY (EGD);  Surgeon: Nasim Louis MD;  Location:  GI     GENITOURINARY SURGERY      KIDNEY STONE REMOVAL X 4     HC UGI ENDOSCOPY W EUS N/A 12/29/2016    Procedure: COMBINED ENDOSCOPIC ULTRASOUND, ESOPHAGOSCOPY, GASTROSCOPY, DUODENOSCOPY (EGD);  Surgeon: Nasim Louis MD;  Location:  GI     HERNIA REPAIR       INCISION AND DRAINAGE FOOT, COMBINED Left 6/6/2017    Procedure: COMBINED INCISION AND DRAINAGE FOOT;  REVISIONAL LEFT FOOT INCISION AND DRAINAGE WITH POSSIBLE FLAP CLOSURE , ANTIBIOTIC SOLUTION ;  Surgeon: Nabil Ann DPM;  Location:  OR     LASER HOLMIUM LITHOTRIPSY URETER(S), INSERT STENT, COMBINED  3/2/2012    Procedure:COMBINED CYSTOSCOPY, URETEROSCOPY, LASER HOLMIUM LITHOTRIPSY URETER(S), INSERT STENT; CYSTOSCOPY, RIGHT RETROGRADES, RIGHT URETEROSCOPY, HOLMIUM LASER, RIGHT STENT PLACEMENT; Surgeon:ANJELICA LEÓN; Location: OR     ROTATOR CUFF REPAIR RT/LT         Family History   Problem Relation Age of Onset     Breast Cancer Mother      Heart Failure Father 81       Social History     Social History     Marital status:      Spouse name: N/A     Number of children: N/A     Years of education: N/A     Occupational History     Not on file.     Social History Main Topics     Smoking status: Former Smoker     Packs/day: 1.00     Years: 30.00     Types: Cigarettes     Quit date: 1/1/1999     Smokeless tobacco: Never Used      Alcohol use 4.2 oz/week     7 Standard drinks or equivalent per week      Comment: 1 drink per week     Drug use: No     Sexual activity: Not Currently     Partners: Female     Other Topics Concern     Not on file     Social History Narrative       Current Outpatient Prescriptions   Medication Sig Dispense Refill     amoxicillin (AMOXIL) 875 MG tablet Take 1 tablet (875 mg) by mouth 2 times daily (Patient not taking: Reported on 3/26/2018) 20 tablet 0     ondansetron (ZOFRAN) 4 MG tablet Take 4 mg by mouth 4 times daily as needed for nausea       bisacodyl (DULCOLAX) 10 MG Suppository Place 10 mg rectally daily as needed for constipation       Cholecalciferol 84378 UNITS TABS Take 1 tablet by mouth daily every Sun for deficiancy       OMEPRAZOLE PO Take 40 mg by mouth 2 times daily        exenatide (BYETTA) 5 mcg/0.02mL per dose (60 doses per 1.2 mL prefilled pen) Inject 5 mcg Subcutaneous 2 times daily (before meals) 1 Syringe 0     MAGNESIUM OXIDE PO Take 400 mg by mouth 2 times daily       polyethylene glycol (MIRALAX/GLYCOLAX) Packet Take 17 g by mouth daily as needed for constipation       LISINOPRIL PO Take 2.5 mg by mouth daily        metoprolol (TOPROL-XL) 25 MG 24 hr tablet Take 0.5 tablets (12.5 mg) by mouth daily       glipiZIDE (GLUCOTROL) 10 MG tablet Take 1 tablet (10 mg) by mouth 2 times daily (before meals) 180 tablet 3     AMITRIPTYLINE HCL PO Take 25 mg by mouth nightly as needed for sleep       ipratropium (ATROVENT) 0.03 % spray Spray 2 sprays into both nostrils 2 times daily as needed        DULoxetine (CYMBALTA) 30 MG EC capsule Take 1 capsule (30 mg) by mouth daily 90 capsule 3     tamsulosin (FLOMAX) 0.4 MG capsule Take 1 capsule (0.4 mg) by mouth daily 90 capsule 3     aspirin EC 81 MG EC tablet Take 1 tablet (81 mg) by mouth daily 30 tablet 0     METFORMIN HCL PO Take 1,000 mg by mouth 2 times daily (with meals)       ATORVASTATIN CALCIUM PO Take 40 mg by mouth At Bedtime        blood  "glucose monitoring (NO BRAND SPECIFIED) test strip Use to test blood sugar three times daily or as directed. 300 each 3     order for DME Equipment being ordered: True Matrix Blood Glucose meter. 1 Device 0     Clopidogrel Bisulfate, 8139173872, (PLAVIX PO) Take 75 mg by mouth daily          Review Of Systems:  Skin: negative  Eyes:  diminished eyesight  Ears/Nose/Throat: negative  Respiratory: No shortness of breath, dyspnea on exertion, cough, or hemoptysis  Cardiovascular: History of vascular disease  Gastrointestinal: negative  Genitourinary: negative  Musculoskeletal: negative  Neurologic: Problems with balance  Psychiatric: negative  Hematologic/Lymphatic/Immunologic: negative  Endocrine: diabetes    Exam:  BP 96/56 (BP Location: Left arm, Patient Position: Sitting, Cuff Size: Adult Regular)  Pulse 58  Ht 1.651 m (5' 5\")  Wt 70.8 kg (156 lb)  BMI 25.96 kg/m2    General Impression: Very pleasant gentleman who is not in distress and is otherwise conversational and seems well oriented.  He is in a wheelchair today.    Mental status.  Appears normal for a man of his age    HEENT: There is no clinical evidence of jaundice there is slight paleness of the mucous membranes    Skin: The skin is otherwise normal to examination    Lymph Nodes: Not examined    Respiratory System: The respiratory cycle is normal    Cardiovascular: Not examined    Abdominal: Not examined    Extremities: Mild peripheral edema noted    Back and Flank: Not examined    Genital: Not examined    Rectal: Not examined    Neurologic: There are no focal abnormal clinical neurological signs in the central, or peripheral nervous systems    Impression: He has had either a persistent urinary tract infection or 2 separate infections with the same organism in the last 6 weeks.  This now seems to be adequately treated.  Blood was identified in the urine at the time of initial infection.  The CT scan does show small stones in each kidney, but no evidence " "of hydronephrosis, stones in the ureter or stones in the bladder.  Postvoid residual of only 20 cc indicates that the bladder is emptying well.  I would like to arrange for cystoscopy in the near future to identify any other causative factor in the lower urinary tract that might be a factor interpreting to his infections.  If this is clear we may give consideration to low-dose antibiotics for a number of months to prevent further infections.  I will have further discussions with the patient in detail as I have today after we have completed cystoscopy.  I answered many questions today    Plan: Cystoscopy    \"This dictation was performed with voice recognition software and may contain errors,  omissions and inadvertent word substitution.\"      Again, thank you for allowing me to participate in the care of your patient.      Sincerely,    Wilmar Medrano MD      "

## 2018-03-26 NOTE — PROGRESS NOTES
History: It is a great pleasure to see this very pleasant 89-year-old gentleman for the first time in many years at the request of Dr. Morrissey.  He has had what appeared to be either a persistent urinary tract infections or 2 separate urinary tract infections with Enterococcus faecalis over the period of the last 6 weeks.  This now appears to have been effectively treated as the urinalysis today is negative.  His postvoid residual is only 20 cc.  He does have a past history of urinary stone disease, having had a ureteroscopic stone extraction on the right side and a bladder stone removal in 2012.  We have not seen him since that time.  A CT scan was performed recently.    CT ABDOMEN PELVIS W CONTRAST  2/8/2018 1:49 AM      HISTORY: Right-sided abdominal pain.     TECHNIQUE: CT abdomen and pelvis with 81mL Isovue-370 IV. Radiation  dose for this scan was reduced using automated exposure control,  adjustment of the mA and/or kV according to patient size, or iterative  reconstruction technique.     COMPARISON: 12/25/2016.     FINDINGS:  Abdomen: Mild fibrosis at the lung bases. The liver, spleen, pancreas  and adrenal glands are normal in appearance. The gallbladder is  absent. There are a few stones within both kidneys. No ureteral stone  or hydronephrosis. There is a 5.5 cm cyst at the upper pole of the  left kidney. There is no abdominal or pelvic lymph node enlargement.  There is atherosclerotic calcification of the aorta and its branches.  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     Pelvis: There are colonic diverticula without acute diverticulitis. No  bowel obstruction or inflammation. The appendix is normal. No free  intraperitoneal gas or fluid. 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  Bilateral L5 pars interarticularis defects.         IMPRESSION:  1. No acute abnormality. No bowel obstruction or inflammation.  2. Colonic diverticula without acute  diverticulitis.  3. Bilateral intrarenal stones. No ureteral stone or hydronephrosis.     WILFRED SALAZAR MD         Currently he has no other major symptoms, has not observed blood in the urine although blood was seen in the urine at the time of urine culture in February of this year.  He has no other major complaints.  I have reviewed his medications and his previous records    Past Medical History:   Diagnosis Date     Aortic root dilatation (H)      Aortic stenosis      Benign essential hypertension 1/6/2017     Benign non-nodular prostatic hyperplasia with lower urinary tract symptoms 10/20/2016     CAD (coronary artery disease)     MI 1970     Cardiomyopathy (H)      Carotid artery stenosis 10/20/2016    chronically occluded left internal carotid artery     Carotid occlusion, left      Coronary artery disease involving native coronary artery of native heart without angina pectoris 10/20/2016     Diabetes mellitus (H)      DJD (degenerative joint disease)      Gastro-oesophageal reflux disease      Gastroesophageal reflux disease without esophagitis 10/20/2016     Heart attack      Hyperlipidemia      Hypertension      Mild cognitive impairment 10/20/2016     Nephrolithiasis     RECURRENT     Osteopenia      PAD (peripheral artery disease) (H)     peripheral angiogram 5/2017: The common iliac artery stenoses were stented and the superficial femoral artery stenoses were angioplastied     Paroxysmal atrial fibrillation (H)      PONV (postoperative nausea and vomiting)      RBBB with left anterior fascicular block      Unspecified cerebral artery occlusion with cerebral infarction        Past Surgical History:   Procedure Laterality Date     AMPUTATE FOOT Left 6/4/2017    Procedure: AMPUTATE FOOT;  Sun Add#3: partial left foot amputation - Sagital Saw - Mini C-Arm;  Surgeon: Moe Kirk DPM;  Location: [facility] OR     CHOLECYSTECTOMY       ENDARTERECTOMY CAROTID Right 1/3/2018    Procedure: ENDARTERECTOMY  CAROTID;  RIGHT CAROTID ENDARTERECTOMY WITH EEG;  Surgeon: Roland Rodriguez MD;  Location:  OR     ESOPHAGOSCOPY, GASTROSCOPY, DUODENOSCOPY (EGD), COMBINED N/A 12/26/2016    Procedure: COMBINED ESOPHAGOSCOPY, GASTROSCOPY, DUODENOSCOPY (EGD);  Surgeon: Nasim Louis MD;  Location:  GI     GENITOURINARY SURGERY      KIDNEY STONE REMOVAL X 4     HC UGI ENDOSCOPY W EUS N/A 12/29/2016    Procedure: COMBINED ENDOSCOPIC ULTRASOUND, ESOPHAGOSCOPY, GASTROSCOPY, DUODENOSCOPY (EGD);  Surgeon: Nasim Louis MD;  Location:  GI     HERNIA REPAIR       INCISION AND DRAINAGE FOOT, COMBINED Left 6/6/2017    Procedure: COMBINED INCISION AND DRAINAGE FOOT;  REVISIONAL LEFT FOOT INCISION AND DRAINAGE WITH POSSIBLE FLAP CLOSURE , ANTIBIOTIC SOLUTION ;  Surgeon: Nabil Ann DPM;  Location:  OR     LASER HOLMIUM LITHOTRIPSY URETER(S), INSERT STENT, COMBINED  3/2/2012    Procedure:COMBINED CYSTOSCOPY, URETEROSCOPY, LASER HOLMIUM LITHOTRIPSY URETER(S), INSERT STENT; CYSTOSCOPY, RIGHT RETROGRADES, RIGHT URETEROSCOPY, HOLMIUM LASER, RIGHT STENT PLACEMENT; Surgeon:ANJELICA LEÓN; Location: OR     ROTATOR CUFF REPAIR RT/LT         Family History   Problem Relation Age of Onset     Breast Cancer Mother      Heart Failure Father 81       Social History     Social History     Marital status:      Spouse name: N/A     Number of children: N/A     Years of education: N/A     Occupational History     Not on file.     Social History Main Topics     Smoking status: Former Smoker     Packs/day: 1.00     Years: 30.00     Types: Cigarettes     Quit date: 1/1/1999     Smokeless tobacco: Never Used     Alcohol use 4.2 oz/week     7 Standard drinks or equivalent per week      Comment: 1 drink per week     Drug use: No     Sexual activity: Not Currently     Partners: Female     Other Topics Concern     Not on file     Social History Narrative       Current Outpatient Prescriptions   Medication Sig  Dispense Refill     amoxicillin (AMOXIL) 875 MG tablet Take 1 tablet (875 mg) by mouth 2 times daily (Patient not taking: Reported on 3/26/2018) 20 tablet 0     ondansetron (ZOFRAN) 4 MG tablet Take 4 mg by mouth 4 times daily as needed for nausea       bisacodyl (DULCOLAX) 10 MG Suppository Place 10 mg rectally daily as needed for constipation       Cholecalciferol 14011 UNITS TABS Take 1 tablet by mouth daily every Sun for deficiancy       OMEPRAZOLE PO Take 40 mg by mouth 2 times daily        exenatide (BYETTA) 5 mcg/0.02mL per dose (60 doses per 1.2 mL prefilled pen) Inject 5 mcg Subcutaneous 2 times daily (before meals) 1 Syringe 0     MAGNESIUM OXIDE PO Take 400 mg by mouth 2 times daily       polyethylene glycol (MIRALAX/GLYCOLAX) Packet Take 17 g by mouth daily as needed for constipation       LISINOPRIL PO Take 2.5 mg by mouth daily        metoprolol (TOPROL-XL) 25 MG 24 hr tablet Take 0.5 tablets (12.5 mg) by mouth daily       glipiZIDE (GLUCOTROL) 10 MG tablet Take 1 tablet (10 mg) by mouth 2 times daily (before meals) 180 tablet 3     AMITRIPTYLINE HCL PO Take 25 mg by mouth nightly as needed for sleep       ipratropium (ATROVENT) 0.03 % spray Spray 2 sprays into both nostrils 2 times daily as needed        DULoxetine (CYMBALTA) 30 MG EC capsule Take 1 capsule (30 mg) by mouth daily 90 capsule 3     tamsulosin (FLOMAX) 0.4 MG capsule Take 1 capsule (0.4 mg) by mouth daily 90 capsule 3     aspirin EC 81 MG EC tablet Take 1 tablet (81 mg) by mouth daily 30 tablet 0     METFORMIN HCL PO Take 1,000 mg by mouth 2 times daily (with meals)       ATORVASTATIN CALCIUM PO Take 40 mg by mouth At Bedtime        blood glucose monitoring (NO BRAND SPECIFIED) test strip Use to test blood sugar three times daily or as directed. 300 each 3     order for DME Equipment being ordered: True Matrix Blood Glucose meter. 1 Device 0     Clopidogrel Bisulfate, [id number], (PLAVIX PO) Take 75 mg by mouth daily          Review Of  "Systems:  Skin: negative  Eyes:  diminished eyesight  Ears/Nose/Throat: negative  Respiratory: No shortness of breath, dyspnea on exertion, cough, or hemoptysis  Cardiovascular: History of vascular disease  Gastrointestinal: negative  Genitourinary: negative  Musculoskeletal: negative  Neurologic: Problems with balance  Psychiatric: negative  Hematologic/Lymphatic/Immunologic: negative  Endocrine: diabetes    Exam:  BP 96/56 (BP Location: Left arm, Patient Position: Sitting, Cuff Size: Adult Regular)  Pulse 58  Ht 1.651 m (5' 5\")  Wt 70.8 kg (156 lb)  BMI 25.96 kg/m2    General Impression: Very pleasant gentleman who is not in distress and is otherwise conversational and seems well oriented.  He is in a wheelchair today.    Mental status.  Appears normal for a man of his age    HEENT: There is no clinical evidence of jaundice there is slight paleness of the mucous membranes    Skin: The skin is otherwise normal to examination    Lymph Nodes: Not examined    Respiratory System: The respiratory cycle is normal    Cardiovascular: Not examined    Abdominal: Not examined    Extremities: Mild peripheral edema noted    Back and Flank: Not examined    Genital: Not examined    Rectal: Not examined    Neurologic: There are no focal abnormal clinical neurological signs in the central, or peripheral nervous systems    Impression: He has had either a persistent urinary tract infection or 2 separate infections with the same organism in the last 6 weeks.  This now seems to be adequately treated.  Blood was identified in the urine at the time of initial infection.  The CT scan does show small stones in each kidney, but no evidence of hydronephrosis, stones in the ureter or stones in the bladder.  Postvoid residual of only 20 cc indicates that the bladder is emptying well.  I would like to arrange for cystoscopy in the near future to identify any other causative factor in the lower urinary tract that might be a factor " "interpreting to his infections.  If this is clear we may give consideration to low-dose antibiotics for a number of months to prevent further infections.  I will have further discussions with the patient in detail as I have today after we have completed cystoscopy.  I answered many questions today    Plan: Cystoscopy    \"This dictation was performed with voice recognition software and may contain errors,  omissions and inadvertent word substitution.\"    "

## 2018-03-27 NOTE — PROGRESS NOTES
The following letter pertains to your most recent diagnostic tests:    Good news! Your barium swallow test did NOT show any dangerous blockages in the esophagus to explain you swallowing problems.  Please return to clinic if your symptoms are persistent.          Sincerely,    Dr. Morrissey

## 2018-04-05 NOTE — PROGRESS NOTES
SUBJECTIVE:   Dustin Pearce is a 89 year old male who presents to clinic today for the following health issues:      1 Month Follow up    Fortunately, his abdominal pain has resolved completely.  He does not have nausea, vomiting or decreased appetite.  He did see a urologist who recommended cystoscopy to identify a cause for recurrent infections.  He has yet to schedule that procedure.  However, today he denies dysuria or hematuria.  He actually feels very well today and has no specific complaints.  His significant other is most concerned about the cost of Byetta.  His home blood sugar readings have been mostly in the 150s in the mornings.  She denies measuring low blood sugars lately.    Problem list and histories reviewed & adjusted, as indicated.  Additional history: as documented    Patient Active Problem List   Diagnosis     Coronary artery disease involving native coronary artery of native heart without angina pectoris     Mild cognitive impairment     Hyperlipidemia, unspecified hyperlipidemia type     Benign non-nodular prostatic hyperplasia with lower urinary tract symptoms     Gastroesophageal reflux disease without esophagitis     Cerebrovascular accident (H)     Carotid artery stenosis     Abdominal aortic aneurysm (H)     Lumbar back pain     Benign essential hypertension     TIA (transient ischemic attack)     Paroxysmal atrial fibrillation (H)     Ischemic cardiomyopathy     Aortic root dilatation (H)     Physical deconditioning     Diabetic ulcer of left foot associated with diabetes mellitus due to underlying condition (H)     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)     Anemia due to blood loss, acute     Diarrhea, unspecified type     Nausea and vomiting, intractability of vomiting not specified, unspecified vomiting type     Acute pain of left shoulder     Balance problems     Generalized muscle weakness     Peripheral artery disease (H)     Aortic stenosis     RBBB with left  anterior fascicular block     Stroke of unknown etiology (H)     Carotid stenosis     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)     History of TIA (transient ischemic attack) and stroke     UTI (urinary tract infection)     UTI (urinary tract infection) due to Enterococcus     Generalized weakness     Type 2 diabetes mellitus with hyperglycemia, unspecified long term insulin use status (H)     Atherosclerosis of coronary artery of native heart without angina pectoris, unspecified vessel or lesion type     Depression, unspecified depression type     Slow transit constipation     Past Surgical History:   Procedure Laterality Date     AMPUTATE FOOT Left 6/4/2017    Procedure: AMPUTATE FOOT;  Sun Add#3: partial left foot amputation - Sagital Saw - Mini C-Arm;  Surgeon: Moe Kirk DPM;  Location:  OR     CHOLECYSTECTOMY       ENDARTERECTOMY CAROTID Right 1/3/2018    Procedure: ENDARTERECTOMY CAROTID;  RIGHT CAROTID ENDARTERECTOMY WITH EEG;  Surgeon: Roland Rodriguez MD;  Location:  OR     ESOPHAGOSCOPY, GASTROSCOPY, DUODENOSCOPY (EGD), COMBINED N/A 12/26/2016    Procedure: COMBINED ESOPHAGOSCOPY, GASTROSCOPY, DUODENOSCOPY (EGD);  Surgeon: Nasim Louis MD;  Location:  GI     GENITOURINARY SURGERY      KIDNEY STONE REMOVAL X 4     [facility] UGI ENDOSCOPY W EUS N/A 12/29/2016    Procedure: COMBINED ENDOSCOPIC ULTRASOUND, ESOPHAGOSCOPY, GASTROSCOPY, DUODENOSCOPY (EGD);  Surgeon: Nasim Louis MD;  Location:  GI     HERNIA REPAIR       INCISION AND DRAINAGE FOOT, COMBINED Left 6/6/2017    Procedure: COMBINED INCISION AND DRAINAGE FOOT;  REVISIONAL LEFT FOOT INCISION AND DRAINAGE WITH POSSIBLE FLAP CLOSURE , ANTIBIOTIC SOLUTION ;  Surgeon: Nabil Ann DPM;  Location:  OR     LASER HOLMIUM LITHOTRIPSY URETER(S), INSERT STENT, COMBINED  3/2/2012    Procedure:COMBINED CYSTOSCOPY, URETEROSCOPY, LASER HOLMIUM LITHOTRIPSY URETER(S), INSERT STENT;  CYSTOSCOPY, RIGHT RETROGRADES, RIGHT URETEROSCOPY, HOLMIUM LASER, RIGHT STENT PLACEMENT; Surgeon:ANJELICA LEÓN; Location:[facility] OR     ROTATOR CUFF REPAIR RT/LT         Social History   Substance Use Topics     Smoking status: Former Smoker     Packs/day: 1.00     Years: 30.00     Types: Cigarettes     Quit date: 1/1/1999     Smokeless tobacco: Never Used     Alcohol use 4.2 oz/week     7 Standard drinks or equivalent per week      Comment: 1 drink per week     Family History   Problem Relation Age of Onset     Breast Cancer Mother      Heart Failure Father 81         Current Outpatient Prescriptions   Medication Sig Dispense Refill     amoxicillin (AMOXIL) 875 MG tablet Take 1 tablet (875 mg) by mouth 2 times daily 20 tablet 0     ondansetron (ZOFRAN) 4 MG tablet Take 4 mg by mouth 4 times daily as needed for nausea       bisacodyl (DULCOLAX) 10 MG Suppository Place 10 mg rectally daily as needed for constipation       Cholecalciferol 07177 UNITS TABS Take 1 tablet by mouth daily every Sun for deficiancy       OMEPRAZOLE PO Take 40 mg by mouth 2 times daily        exenatide (BYETTA) 5 mcg/0.02mL per dose (60 doses per 1.2 mL prefilled pen) Inject 5 mcg Subcutaneous 2 times daily (before meals) 1 Syringe 0     MAGNESIUM OXIDE PO Take 400 mg by mouth 2 times daily       polyethylene glycol (MIRALAX/GLYCOLAX) Packet Take 17 g by mouth daily as needed for constipation       LISINOPRIL PO Take 2.5 mg by mouth daily        metoprolol (TOPROL-XL) 25 MG 24 hr tablet Take 0.5 tablets (12.5 mg) by mouth daily       glipiZIDE (GLUCOTROL) 10 MG tablet Take 1 tablet (10 mg) by mouth 2 times daily (before meals) 180 tablet 3     AMITRIPTYLINE HCL PO Take 25 mg by mouth nightly as needed for sleep       ipratropium (ATROVENT) 0.03 % spray Spray 2 sprays into both nostrils 2 times daily as needed        DULoxetine (CYMBALTA) 30 MG EC capsule Take 1 capsule (30 mg) by mouth daily 90 capsule 3     tamsulosin (FLOMAX) 0.4 MG  "capsule Take 1 capsule (0.4 mg) by mouth daily 90 capsule 3     aspirin EC 81 MG EC tablet Take 1 tablet (81 mg) by mouth daily 30 tablet 0     METFORMIN HCL PO Take 1,000 mg by mouth 2 times daily (with meals)       ATORVASTATIN CALCIUM PO Take 40 mg by mouth At Bedtime        blood glucose monitoring (NO BRAND SPECIFIED) test strip Use to test blood sugar three times daily or as directed. 300 each 3     order for DME Equipment being ordered: True Matrix Blood Glucose meter. 1 Device 0     Clopidogrel Bisulfate, [id number], (PLAVIX PO) Take 75 mg by mouth daily        Allergies   Allergen Reactions     Oxycodone Other (See Comments)     \"TERRIBLE SWEATING\"     Sulfa Drugs      Tetracycline        Reviewed and updated as needed this visit by clinical staff       Reviewed and updated as needed this visit by Provider         ROS:  Constitutional, HEENT, cardiovascular, pulmonary, gi and gu systems are negative, except as otherwise noted.    OBJECTIVE:     /66  Pulse 55  Temp 97  F (36.1  C) (Oral)  Ht 5' 5\" (1.651 m)  Wt 162 lb (73.5 kg)  SpO2 97%  BMI 26.96 kg/m2  Body mass index is 26.96 kg/(m^2).  GENERAL: healthy, alert and no distress    Diagnostic Test Results:  Results for orders placed or performed in visit on 04/11/18   Hemoglobin A1c   Result Value Ref Range    Hemoglobin A1C 7.6 (H) 0 - 6.4 %       ASSESSMENT/PLAN:       1. 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 Recurrent UTI  Follow up with Dr. Medrano as directed for cystoscopy     Not sure what caused abdominal pain, but symptoms have resolved, monitor for reoccurance     FUTURE APPOINTMENTS:       - Follow-up visit in 3 months with lab appointment prior for A1c ; sooner as " needed     Matt Morrissey MD  [facility]

## 2018-04-11 ENCOUNTER — OFFICE VISIT (OUTPATIENT)
Dept: FAMILY MEDICINE | Facility: CLINIC | Age: 83
End: 2018-04-11
Payer: MEDICARE

## 2018-04-11 VITALS
OXYGEN SATURATION: 97 % | HEIGHT: 65 IN | HEART RATE: 55 BPM | WEIGHT: 162 LBS | BODY MASS INDEX: 26.99 KG/M2 | SYSTOLIC BLOOD PRESSURE: 138 MMHG | TEMPERATURE: 97 F | DIASTOLIC BLOOD PRESSURE: 66 MMHG

## 2018-04-11 DIAGNOSIS — N39.0 RECURRENT UTI: ICD-10-CM

## 2018-04-11 DIAGNOSIS — E11.42 DM TYPE 2 WITH DIABETIC PERIPHERAL NEUROPATHY (H): Primary | ICD-10-CM

## 2018-04-11 LAB — HBA1C MFR BLD: 7.6 % (ref 0–6.4)

## 2018-04-11 PROCEDURE — 99213 OFFICE O/P EST LOW 20 MIN: CPT | Performed by: INTERNAL MEDICINE

## 2018-04-11 PROCEDURE — 83036 HEMOGLOBIN GLYCOSYLATED A1C: CPT | Performed by: INTERNAL MEDICINE

## 2018-04-11 PROCEDURE — 36415 COLL VENOUS BLD VENIPUNCTURE: CPT | Performed by: INTERNAL MEDICINE

## 2018-04-11 NOTE — NURSING NOTE
"Chief Complaint   Patient presents with     1 month follow up        Initial /64 (BP Location: Right arm, Patient Position: Chair, Cuff Size: Adult Regular)  Pulse 55  Temp 97  F (36.1  C) (Oral)  Ht 5' 5\" (1.651 m)  Wt 162 lb (73.5 kg)  SpO2 97%  BMI 26.96 kg/m2 Estimated body mass index is 26.96 kg/(m^2) as calculated from the following:    Height as of this encounter: 5' 5\" (1.651 m).    Weight as of this encounter: 162 lb (73.5 kg)..    BP completed using cuff size: regular  MEDICATIONS REVIEWED  SOCIAL AND FAMILY HX REVIEWED  Usha Wilde CMA  "

## 2018-04-11 NOTE — MR AVS SNAPSHOT
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  [facility] [address]   [phone]           Please bring a list of your medicines (including vitamins, minerals and over-the-counter drugs). Also, tell your doctor about any allergies you may have. Wear comfortable clothes and leave your valuables at home.  You do not need to do anything special to prepare for your exam.  Please call the Imaging Department at your exam site with any questions.            Apr 19, 2018 11:30 AM CDT   Return Visit with Roland Rodriguez MD   [facility] ([facility])    [address]  [facility] [address] [phone]            Apr 25, 2018  8:00 AM CDT   Cystoscopy with Wilmar Medrano MD, [facility] ([facility])    [address]  [address]  [facility] [address]   [phone]            Jul 12, 2018  6:30 PM CDT   Office Visit with Matt Morrissey MD   [facility]    [address] [facility] [address]   [phone]           Bring a current list of meds and any records pertaining to this visit. For Physicals, please bring immunization records and any forms needing to be filled out. Please arrive 10 minutes early to complete paperwork.             "  Future tests that were ordered for you today     Open Future Orders        Priority Expected Expires Ordered    **A1C FUTURE 3mo Routine 7/10/2018 2018 2018            Who to contact     If you have questions or need follow up information about today's clinic visit or your schedule please contact [facility] directly at [phone].  Normal or non-critical lab and imaging results will be communicated to you by MyChart, letter or phone within 4 business days after the clinic has received the results. If you do not hear from us within 7 days, please contact the clinic through [facility] or phone. If you have a critical or abnormal lab result, we will notify you by phone as soon as possible.  Submit refill requests through [employer] or call your pharmacy and they will forward the refill request to us. Please allow 3 business days for your refill to be completed.          Additional Information About Your Visit        Cot[employer] Information     [employer] lets you send messages to your doctor, view your test results, renew your prescriptions, schedule appointments and more. To sign up, go to [email or web address] . Click on \"Log in\" on the left side of the screen, which will take you to the Welcome page. Then click on \"Sign up Now\" on the right side of the page.     You will be asked to enter the access code listed below, as well as some personal information. Please follow the directions to create your username and password.     Your access code is: [email or web address]  Expires: 7/10/2018  6:16 PM     Your access code will  in 90 days. If you need help or a new code, please call your [facility] clinic or [phone].        Care EveryWhere ID     This is your Care EveryWhere ID. This could be used by other organizations to access your [facility] medical records  [id number]        Your Vitals Were     Pulse Temperature Height Pulse Oximetry BMI (Body Mass Index)       55 97  F (36.1  C) (Oral) 5' 5\" (1.651 m) " 97% 26.96 kg/m2        Blood Pressure from Last 3 Encounters:   04/11/18 138/66   03/26/18 96/56   03/12/18 123/66    Weight from Last 3 Encounters:   04/11/18 162 lb (73.5 kg)   03/26/18 156 lb (70.8 kg)   03/12/18 156 lb (70.8 kg)              We Performed the Following     Hemoglobin A1c          Today's Medication Changes          These changes are accurate as of 4/11/18  6:16 PM.  If you have any questions, ask your nurse or doctor.               Stop taking these medicines if you haven't already. Please contact your care team if you have questions.     exenatide 5 MCG/0.02ML Sopn injection   Commonly known as:  BYETTA   Stopped by:  Matt Morrissey MD                    Primary Care Provider Office Phone # Fax #    Matt Morrissey [phone]       [facility] [address] [place] [address] [address]  [facility] [address]        Equal Access to Services     [facility]: Hadii aad ku hadasho Soomaali, waaxda luqadaha, qaybta kaalmada adeegyada, waxay idiin hayaan adeeg kharakyle laedilma . So [facility] [phone].    ATENCIÓN: Si habla español, tiene a garvey disposición servicios gratuitos de asistencia lingüística. [facility] [phone].    We comply with applicable federal civil rights laws and [place] laws. We do not discriminate on the basis of race, color, national origin, age, disability, sex, sexual orientation, or gender identity.            Thank you!     Thank you for choosing [facility]  for your care. Our goal is always to provide you with excellent care. Hearing back from our patients is one way we can continue to improve our services. Please take a few minutes to complete the written survey that you may receive in the mail after your visit with us. Thank you!             Your Updated Medication List - Protect others around you: Learn how to safely use, store and throw away your medicines at www.disposemymeds.org.          This list is accurate as of 4/11/18  6:16 PM.  Always use  your most recent med list.                   Brand Name Dispense Instructions for use Diagnosis    AMITRIPTYLINE HCL PO      Take 25 mg by mouth nightly as needed for sleep        amoxicillin 875 MG tablet    AMOXIL    20 tablet    Take 1 tablet (875 mg) by mouth 2 times daily    Urinary tract infection without hematuria, site unspecified       aspirin 81 MG EC tablet     30 tablet    Take 1 tablet (81 mg) by mouth daily    Transient cerebral ischemia, unspecified type       ATORVASTATIN CALCIUM PO      Take 40 mg by mouth At Bedtime        bisacodyl 10 MG Suppository    DULCOLAX     Place 10 mg rectally daily as needed for constipation        blood glucose monitoring test strip    no brand specified    300 each    Use to test blood sugar three times daily or as directed.    Hypoglycemia       Cholecalciferol 11050 units Tabs      Take 1 tablet by mouth daily every Sun for deficiancy        DULoxetine 30 MG EC capsule    CYMBALTA    90 capsule    Take 1 capsule (30 mg) by mouth daily    Polyneuropathy associated with underlying disease (H)       glipiZIDE 10 MG tablet    GLUCOTROL    180 tablet    Take 1 tablet (10 mg) by mouth 2 times daily (before meals)    Type 2 diabetes mellitus with diabetic peripheral angiopathy without gangrene, without long-term current use of insulin (H)       ipratropium 0.03 % spray    ATROVENT     Spray 2 sprays into both nostrils 2 times daily as needed        LISINOPRIL PO      Take 2.5 mg by mouth daily        MAGNESIUM OXIDE PO      Take 400 mg by mouth 2 times daily        METFORMIN HCL PO      Take 1,000 mg by mouth 2 times daily (with meals)        metoprolol succinate 25 MG 24 hr tablet    TOPROL-XL     Take 0.5 tablets (12.5 mg) by mouth daily    Cardiomyopathy, unspecified type (H)       OMEPRAZOLE PO      Take 40 mg by mouth 2 times daily        order for DME     1 Device    Equipment being ordered: True Matrix Blood Glucose meter.    Type 2 diabetes mellitus without  complication (H)       PLAVIX PO      Take 75 mg by mouth daily    Cough       polyethylene glycol Packet    MIRALAX/GLYCOLAX     Take 17 g by mouth daily as needed for constipation        tamsulosin 0.4 MG capsule    FLOMAX    90 capsule    Take 1 capsule (0.4 mg) by mouth daily    Benign non-nodular prostatic hyperplasia with lower urinary tract symptoms       ZOFRAN 4 MG tablet   Generic drug:  ondansetron      Take 4 mg by mouth 4 times daily as needed for nausea

## 2018-04-19 ENCOUNTER — HOSPITAL ENCOUNTER (OUTPATIENT)
Dept: ULTRASOUND IMAGING | Facility: CLINIC | Age: 83
Discharge: HOME | End: 2018-04-19
Attending: SURGERY | Admitting: SURGERY
Payer: MEDICARE

## 2018-04-19 ENCOUNTER — OFFICE VISIT (OUTPATIENT)
Dept: OTHER | Facility: CLINIC | Age: 83
End: 2018-04-19
Attending: SURGERY
Payer: MEDICARE

## 2018-04-19 VITALS — SYSTOLIC BLOOD PRESSURE: 139 MMHG | HEART RATE: 55 BPM | DIASTOLIC BLOOD PRESSURE: 58 MMHG

## 2018-04-19 DIAGNOSIS — Z48.812 POSTOP CAROTID ENDARTERECTOMY SURVEILLANCE, ENCOUNTER FOR: ICD-10-CM

## 2018-04-19 DIAGNOSIS — E78.5 HYPERLIPIDEMIA LDL GOAL <70: ICD-10-CM

## 2018-04-19 DIAGNOSIS — I71.40 ABDOMINAL AORTIC ANEURYSM (AAA) WITHOUT RUPTURE (H): ICD-10-CM

## 2018-04-19 DIAGNOSIS — I65.21 CAROTID ARTERY STENOSIS WITHOUT CEREBRAL INFARCTION, RIGHT: Primary | ICD-10-CM

## 2018-04-19 DIAGNOSIS — I65.21 CAROTID STENOSIS, RIGHT: ICD-10-CM

## 2018-04-19 DIAGNOSIS — I65.21 CAROTID STENOSIS, SYMPTOMATIC W/O INFARCT, RIGHT: Primary | ICD-10-CM

## 2018-04-19 PROCEDURE — 99214 OFFICE O/P EST MOD 30 MIN: CPT | Mod: ZP | Performed by: SURGERY

## 2018-04-19 PROCEDURE — 93882 EXTRACRANIAL UNI/LTD STUDY: CPT | Mod: RT

## 2018-04-19 PROCEDURE — G0463 HOSPITAL OUTPT CLINIC VISIT: HCPCS

## 2018-04-19 NOTE — NURSING NOTE
"Chief Complaint   Patient presents with     RECHECK     US R carotid (11:00 [facility], 11:30 O) History of right carotid endarterectomy; 3 mo f/u to 1/18/18 appt with Dr. Rodriguez.       Initial /58 (BP Location: Left arm, Patient Position: Chair, Cuff Size: Adult Large)  Pulse 55 Estimated body mass index is 26.96 kg/(m^2) as calculated from the following:    Height as of 4/11/18: 5' 5\" (1.651 m).    Weight as of 4/11/18: 162 lb (73.5 kg).  Medication Reconciliation: complete     Deidra Maria MA   "

## 2018-04-19 NOTE — PROGRESS NOTES
[place] [facility]    Dustin Pearce returns for vascular follow-up.  He has a chronically occluded left ICA and was found to have a 50-60% calcified stenosis of the right carotid bifurcation.  He suffered a right hemispheric TIA despite being on dual antiplatelet therapy.  He does underwent a right CEA 1/3/2018 with no complications.  He is done well following the surgery.      He has known cardiomyopathy with ejection fraction of 35%.  He is done well from the standpoint.    He is here with his wife today.  He has no specific complaints.  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      PMH: Medications reviewed which include aspirin and Plavix.              He is also on Lipitor with most recent LDL=61              Lisinopril, metformin, Flomax, Cymbalta, Toprol-XL      Exam: Alert and appropriate.  Blood pressure 139/58.  Pulse 55             Well-healed right carotid incision.              Soft bilateral carotid bruits.              Chest = clear   Cardiovascular=R right carotid duplex revealsR      Widely patent CEA with no recurrent stenosis.      He has a known 3.3 x 3 cm AAA of no clinical concern.      Impression: Widely patent right CEA with known left ICA chronic occlusion.   Repeat duplex ultrasound right carotid only in 6 months .                         Known small AAA.                          Good risk factor control with non-smoking and well-controlled LDL         Roland Rodriguez MD     Please route or send letter to:  Primary Care Provider (PCP)

## 2018-04-19 NOTE — MR AVS SNAPSHOT
After Visit Summary   4/19/2018    Dustin Pearce    MRN: [id number]           Patient Information     Date Of Birth          1/31/1928        Visit Information        Provider Department      4/19/2018 11:30 AM Roland Rodriguez MD [facility]      Today's Diagnoses     Carotid stenosis, symptomatic w/o infarct, right    -  1    Postop carotid endarterectomy surveillance, encounter for        Hyperlipidemia LDL goal <70        Abdominal aortic aneurysm (AAA) without rupture (H)           Follow-ups after your visit        Follow-up notes from your care team     Return in about 6 months (around 10/19/2018).      Your next 10 appointments already scheduled     Apr 25, 2018  8:00 AM CDT   Cystoscopy with Wilmar Medrano MD, [facility] ([facility] [place])    [address] [address] [phone]            Jul 12, 2018  6:30 PM CDT   Office Visit with Matt Morrissey MD   [facility] ([facility])    [address] [address] [phone]           Bring a current list of meds and any records pertaining to this visit. For Physicals, please bring immunization records and any forms needing to be filled out. Please arrive 10 minutes early to complete paperwork.              Who to contact     If you have questions or need follow up information about today's clinic visit or your schedule please contact [facility] directly at [phone].  Normal or non-critical lab and imaging results will be communicated to you by MyChart, letter or phone within 4 business days after the clinic has received the results. If you do not hear from us within 7 days, please contact the clinic through MyChart or phone. If you have a critical or abnormal lab result, we will notify you by phone as soon as  "possible.  Submit refill requests through [employer] or call your pharmacy and they will forward the refill request to us. Please allow 3 business days for your refill to be completed.          Additional Information About Your Visit        [employer] Information     [employer] lets you send messages to your doctor, view your test results, renew your prescriptions, schedule appointments and more. To sign up, go to [email or web address] . Click on \"Log in\" on the left side of the screen, which will take you to the Welcome page. Then click on \"Sign up Now\" on the right side of the page.     You will be asked to enter the access code listed below, as well as some personal information. Please follow the directions to create your username and password.     Your access code is: [email or web address]  Expires: 7/10/2018  6:16 PM     Your access code will  in 90 days. If you need help or a new code, please call your [place] clinic or [phone].        Care EveryWhere ID     This is your Care EveryWhere ID. This could be used by other organizations to access your [place] medical records  [id number]        Your Vitals Were     Pulse                   55            Blood Pressure from Last 3 Encounters:   18 139/58   18 138/66   18 96/56    Weight from Last 3 Encounters:   18 162 lb (73.5 kg)   18 156 lb (70.8 kg)   18 156 lb (70.8 kg)              Today, you had the following     No orders found for display       Primary Care Provider Office Phone # Fax #    Matt Morrissey [phone]       [facility] [address]  [place] [address]        Equal Access to Services     [facility] : Hadii kinza Brooks, watracey sweeney, ashley kaalmada kofi, maurice miranda. So [facility] [phone].    ATENCIÓN: Si habla español, tiene a garvey disposición servicios gratuitos de asistencia lingüística. Llame al [phone].    We " comply with applicable federal civil rights laws and [place] laws. We do not discriminate on the basis of race, color, national origin, age, disability, sex, sexual orientation, or gender identity.            Thank you!     Thank you for choosing [facility] [place]  for your care. Our goal is always to provide you with excellent care. Hearing back from our patients is one way we can continue to improve our services. Please take a few minutes to complete the written survey that you may receive in the mail after your visit with us. Thank you!             Your Updated Medication List - Protect others around you: Learn how to safely use, store and throw away your medicines at www.disposemymeds.org.          This list is accurate as of 4/19/18 11:53 AM.  Always use your most recent med list.                   Brand Name Dispense Instructions for use Diagnosis    AMITRIPTYLINE HCL PO      Take 25 mg by mouth nightly as needed for sleep        amoxicillin 875 MG tablet    AMOXIL    20 tablet    Take 1 tablet (875 mg) by mouth 2 times daily    Urinary tract infection without hematuria, site unspecified       aspirin 81 MG EC tablet     30 tablet    Take 1 tablet (81 mg) by mouth daily    Transient cerebral ischemia, unspecified type       ATORVASTATIN CALCIUM PO      Take 40 mg by mouth At Bedtime        bisacodyl 10 MG Suppository    DULCOLAX     Place 10 mg rectally daily as needed for constipation        blood glucose monitoring test strip    no brand specified    300 each    Use to test blood sugar three times daily or as directed.    Hypoglycemia       Cholecalciferol 46304 units Tabs      Take 1 tablet by mouth daily every Sun for deficiancy        DULoxetine 30 MG EC capsule    CYMBALTA    90 capsule    Take 1 capsule (30 mg) by mouth daily    Polyneuropathy associated with underlying disease (H)       glipiZIDE 10 MG tablet    GLUCOTROL    180 tablet    Take 1 tablet (10 mg) by mouth 2 times daily  (before meals)    Type 2 diabetes mellitus with diabetic peripheral angiopathy without gangrene, without long-term current use of insulin (H)       ipratropium 0.03 % spray    ATROVENT     Spray 2 sprays into both nostrils 2 times daily as needed        LISINOPRIL PO      Take 2.5 mg by mouth daily        MAGNESIUM OXIDE PO      Take 400 mg by mouth 2 times daily        METFORMIN HCL PO      Take 1,000 mg by mouth 2 times daily (with meals)        metoprolol succinate 25 MG 24 hr tablet    TOPROL-XL     Take 0.5 tablets (12.5 mg) by mouth daily    Cardiomyopathy, unspecified type (H)       OMEPRAZOLE PO      Take 40 mg by mouth 2 times daily        order for DME     1 Device    Equipment being ordered: [employer] Blood Glucose meter.    Type 2 diabetes mellitus without complication (H)       PLAVIX PO      Take 75 mg by mouth daily    Cough       polyethylene glycol Packet    MIRALAX/GLYCOLAX     Take 17 g by mouth daily as needed for constipation        tamsulosin 0.4 MG capsule    FLOMAX    90 capsule    Take 1 capsule (0.4 mg) by mouth daily    Benign non-nodular prostatic hyperplasia with lower urinary tract symptoms       ZOFRAN 4 MG tablet   Generic drug:  ondansetron      Take 4 mg by mouth 4 times daily as needed for nausea

## 2018-04-24 DIAGNOSIS — N39.0 URINARY TRACT INFECTION: Primary | ICD-10-CM

## 2018-04-25 ENCOUNTER — OFFICE VISIT (OUTPATIENT)
Dept: UROLOGY | Facility: CLINIC | Age: 83
End: 2018-04-25
Payer: MEDICARE

## 2018-04-25 VITALS
BODY MASS INDEX: 26.99 KG/M2 | WEIGHT: 162 LBS | OXYGEN SATURATION: 96 % | DIASTOLIC BLOOD PRESSURE: 60 MMHG | HEIGHT: 65 IN | SYSTOLIC BLOOD PRESSURE: 138 MMHG | HEART RATE: 68 BPM

## 2018-04-25 DIAGNOSIS — K59.00 CONSTIPATION, UNSPECIFIED CONSTIPATION TYPE: Primary | ICD-10-CM

## 2018-04-25 DIAGNOSIS — R31.9 URINARY TRACT INFECTION WITH HEMATURIA, SITE UNSPECIFIED: ICD-10-CM

## 2018-04-25 DIAGNOSIS — Z79.2 PROPHYLACTIC ANTIBIOTIC: Primary | ICD-10-CM

## 2018-04-25 DIAGNOSIS — N39.0 URINARY TRACT INFECTION WITH HEMATURIA, SITE UNSPECIFIED: ICD-10-CM

## 2018-04-25 PROCEDURE — 52000 CYSTOURETHROSCOPY: CPT | Performed by: UROLOGY

## 2018-04-25 PROCEDURE — 99212 OFFICE O/P EST SF 10 MIN: CPT | Mod: 25 | Performed by: UROLOGY

## 2018-04-25 RX ORDER — CIPROFLOXACIN 500 MG/1
500 TABLET, FILM COATED ORAL ONCE
Qty: 1 TABLET | Refills: 0 | Status: SHIPPED | OUTPATIENT
Start: 2018-04-25 | End: 2018-04-25

## 2018-04-25 ASSESSMENT — PAIN SCALES - GENERAL: PAINLEVEL: NO PAIN (0)

## 2018-04-25 NOTE — PATIENT INSTRUCTIONS
"     AFTER YOUR CYSTOSCOPY         You have just completed a cystoscopy, or \"cysto\", which allowed your physician to learn more about your bladder (or to remove a stent placed after surgery). We suggest that you continue to avoid caffeine, fruit juice, and alcohol for the next 24 hours, however, you are encouraged to return to your normal activities.       A few things that are considered normal after your cystoscopy:    * small amount of bleeding (or spotting) that clears within the next 24 hours    * slight burning sensation with urination    * sensation to of needing to avoid more frequently    * the feeling of \"air\" in your urine    * mild discomfort that is relieved with Tylonol        Please contact our office promptly if you:    * develop a fever above 101 degrees    * are unable to urinate    * develop bright red blood that does not stop    * severe pain or swelling        And of course, please contact our office with any concerns or questions [phone]  "

## 2018-04-25 NOTE — TELEPHONE ENCOUNTER
Requested Prescriptions   Pending Prescriptions Disp Refills     magnesium oxide (MAG-OX) 400 (241.3 Mg) MG tablet [Pharmacy Med Name: MAG OXIDE 400MG TABLETS] 60 tablet 0     Sig: TAKE 1 TABLET BY MOUTH TWICE DAILY    There is no refill protocol information for this order            Last Written Prescription Date:  ?  Last Fill Quantity: ?,   # refills: ?  Last Office Visit: 4/11/18 ([place])  Future Office visit:    Next 5 appointments (look out 90 days)     Jul 12, 2018  6:30 PM CDT   Office Visit with Matt Morrissey MD   [facility]    [address] [facility] [address]   [phone]                     Routing refill request to provider for review/approval because:  Drug not on the [facility], [facility] or [facility] refill protocol or controlled substance    Medication is reported/historical

## 2018-04-25 NOTE — MR AVS SNAPSHOT
"              After Visit Summary   4/25/2018    Dustin Pearce    MRN: [id number]           Patient Information     Date Of Birth          1/31/1928        Visit Information        Provider Department      4/25/2018 8:00 AM Wilmar Medrano MD; [facility] [place]        Today's Diagnoses     Prophylactic antibiotic    -  1    Urinary tract infection with hematuria, site unspecified          Care Instructions         AFTER YOUR CYSTOSCOPY         You have just completed a cystoscopy, or \"cysto\", which allowed your physician to learn more about your bladder (or to remove a stent placed after surgery). We suggest that you continue to avoid caffeine, fruit juice, and alcohol for the next 24 hours, however, you are encouraged to return to your normal activities.       A few things that are considered normal after your cystoscopy:    * small amount of bleeding (or spotting) that clears within the next 24 hours    * slight burning sensation with urination    * sensation to of needing to avoid more frequently    * the feeling of \"air\" in your urine    * mild discomfort that is relieved with Tylonol        Please contact our office promptly if you:    * develop a fever above 101 degrees    * are unable to urinate    * develop bright red blood that does not stop    * severe pain or swelling        And of course, please contact our office with any concerns or questions [phone]          Follow-ups after your visit        Follow-up notes from your care team     Return if symptoms worsen or fail to improve.      Your next 10 appointments already scheduled     Jul 12, 2018  6:30 PM CDT   Office Visit with Matt Morrissey MD   [facility] ([facility])    [address] [address] [phone]           Bring a current list of meds and any records pertaining to this visit. For Physicals, please bring immunization records and any forms " "needing to be filled out. Please arrive 10 minutes early to complete paperwork.              Who to contact     If you have questions or need follow up information about today's clinic visit or your schedule please contact [facility] [place] directly at [phone].  Normal or non-critical lab and imaging results will be communicated to you by MyChart, letter or phone within 4 business days after the clinic has received the results. If you do not hear from us within 7 days, please contact the clinic through [employer] or phone. If you have a critical or abnormal lab result, we will notify you by phone as soon as possible.  Submit refill requests through [employer] or call your pharmacy and they will forward the refill request to us. Please allow 3 business days for your refill to be completed.          Additional Information About Your Visit        [employer] Information     [employer] lets you send messages to your doctor, view your test results, renew your prescriptions, schedule appointments and more. To sign up, go to [email or web address] . Click on \"Log in\" on the left side of the screen, which will take you to the Welcome page. Then click on \"Sign up Now\" on the right side of the page.     You will be asked to enter the access code listed below, as well as some personal information. Please follow the directions to create your username and password.     Your access code is: [email or web address]  Expires: 7/10/2018  6:16 PM     Your access code will  in 90 days. If you need help or a new code, please call your [place] clinic or [phone].        Care EveryWhere ID     This is your Care EveryWhere ID. This could be used by other organizations to access your [place] medical records  [id number]        Your Vitals Were     Pulse Height Pulse Oximetry BMI (Body Mass Index)          68 1.651 m (5' 5\") 96% 26.96 kg/m2         Blood Pressure from Last 3 Encounters:   18 138/60 "   04/19/18 139/58   04/11/18 138/66    Weight from Last 3 Encounters:   04/25/18 73.5 kg (162 lb)   04/11/18 73.5 kg (162 lb)   03/26/18 70.8 kg (156 lb)              We Performed the Following     CYSTOURETHROSCOPY (46348)     UA without Microscopic          Today's Medication Changes          These changes are accurate as of 4/25/18  8:52 AM.  If you have any questions, ask your nurse or doctor.               Start taking these medicines.        Dose/Directions    ciprofloxacin 500 MG tablet   Commonly known as:  CIPRO   Used for:  Prophylactic antibiotic   Started by:  Wilmar Medrano MD        Dose:  500 mg   Take 1 tablet (500 mg) by mouth once for 1 dose   Quantity:  1 tablet   Refills:  0            Where to get your medicines      These medications were sent to [facility] [place], [place] - [address]  [address], [place] [address]     Phone:  [phone]     ciprofloxacin 500 MG tablet                Primary Care Provider Office Phone # Fax #    Matt Morrissey [phone]       [facility] [address]  [place] [address]        Equal Access to Services     LAURENCE JACOBSON AH: Hadii kinza julien hadmoriso Socharley, waaxda luqadaha, qaybta kaalmada adeegyada, maurice miranda. So [facility] [phone].    ATENCIÓN: Si habla español, tiene a garvey disposición servicios gratuitos de asistencia lingüística. Llame al [phone].    We comply with applicable federal civil rights laws and [place] laws. We do not discriminate on the basis of race, color, national origin, age, disability, sex, sexual orientation, or gender identity.            Thank you!     Thank you for choosing [facility] [place]  for your care. Our goal is always to provide you with excellent care. Hearing back from our patients is one way we can continue to improve our services. Please take a few minutes to complete the  written survey that you may receive in the mail after your visit with us. Thank you!             Your Updated Medication List - Protect others around you: Learn how to safely use, store and throw away your medicines at www.disposemymeds.org.          This list is accurate as of 4/25/18  8:52 AM.  Always use your most recent med list.                   Brand Name Dispense Instructions for use Diagnosis    AMITRIPTYLINE HCL PO      Take 25 mg by mouth nightly as needed for sleep        amoxicillin 875 MG tablet    AMOXIL    20 tablet    Take 1 tablet (875 mg) by mouth 2 times daily    Urinary tract infection without hematuria, site unspecified       aspirin 81 MG EC tablet     30 tablet    Take 1 tablet (81 mg) by mouth daily    Transient cerebral ischemia, unspecified type       ATORVASTATIN CALCIUM PO      Take 40 mg by mouth At Bedtime        bisacodyl 10 MG Suppository    DULCOLAX     Place 10 mg rectally daily as needed for constipation        blood glucose monitoring test strip    no brand specified    300 each    Use to test blood sugar three times daily or as directed.    Hypoglycemia       Cholecalciferol 03016 units Tabs      Take 1 tablet by mouth daily every Sun for deficiancy        ciprofloxacin 500 MG tablet    CIPRO    1 tablet    Take 1 tablet (500 mg) by mouth once for 1 dose    Prophylactic antibiotic       DULoxetine 30 MG EC capsule    CYMBALTA    90 capsule    Take 1 capsule (30 mg) by mouth daily    Polyneuropathy associated with underlying disease (H)       glipiZIDE 10 MG tablet    GLUCOTROL    180 tablet    Take 1 tablet (10 mg) by mouth 2 times daily (before meals)    Type 2 diabetes mellitus with diabetic peripheral angiopathy without gangrene, without long-term current use of insulin (H)       ipratropium 0.03 % spray    ATROVENT     Spray 2 sprays into both nostrils 2 times daily as needed        LISINOPRIL PO      Take 2.5 mg by mouth daily        MAGNESIUM OXIDE PO      Take 400 mg by  mouth 2 times daily        METFORMIN HCL PO      Take 1,000 mg by mouth 2 times daily (with meals)        metoprolol succinate 25 MG 24 hr tablet    TOPROL-XL     Take 0.5 tablets (12.5 mg) by mouth daily    Cardiomyopathy, unspecified type (H)       OMEPRAZOLE PO      Take 40 mg by mouth 2 times daily        order for DME     1 Device    Equipment being ordered: [employer] Blood Glucose meter.    Type 2 diabetes mellitus without complication (H)       PLAVIX PO      Take 75 mg by mouth daily    Cough       polyethylene glycol Packet    MIRALAX/GLYCOLAX     Take 17 g by mouth daily as needed for constipation        tamsulosin 0.4 MG capsule    FLOMAX    90 capsule    Take 1 capsule (0.4 mg) by mouth daily    Benign non-nodular prostatic hyperplasia with lower urinary tract symptoms       ZOFRAN 4 MG tablet   Generic drug:  ondansetron      Take 4 mg by mouth 4 times daily as needed for nausea

## 2018-04-25 NOTE — LETTER
4/25/2018       RE: Dustin Pearce  [address]  [place] [address]     Dear Colleague,    Thank you for referring your patient, Dustin Pearce, to the [facility] [place] at [facility]. Please see a copy of my visit note below.    This very pleasant 19-year-old gentleman returns today for cystoscopy.  He has had recurrent urinary tract infections with evidence of blood in the urine.  The blood was identified in the urine at the time of initial infection.  His postvoid residuals have been low.  The CT scan showed small stones in each kidney but no evidence of hydronephrosis, stones in the ureter, stones in the bladder or other remarkable features.     Procedure.  Cystoscopy.   Surgeon.    Marcela  Anesthesia.  Local anesthesia.  Description.  With the patient in the supine position, with the genital area prepped and draped in the customary fashion, with local anesthetic and the urethra, the flexible cystoscope was Inserted.  The penile urethra is normal.  The external sphincter is intact.  When viewed from verumontanum there is minimal enlargement of the lateral lobes of the prostate.  When viewed retrospectively there is no evidence of significant median lobe.  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  Ureteric openings are in satisfactory position and otherwise unremarkable.  There is very mild bladder trabeculation only.  There is no evidence of neoplasm or stone in the bladder.  There are no other remarkable features.    Impression.  The findings indicate no serious underlying cause for recurrent urinary infection.  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  We did discuss whether we should consider putting him on low-dose prophylactic antibiotic treatment for the time being and I decided, since he has had no symptoms now for a while that we will not do this at present.  We will however very strongly  "consider this should any further infections occur.  I do recommend, because he is troubled by mild nocturia ×3 that he try and reduce fluids in the evening, and he may want to drink decaffeinated sodas rather than regular sodas.  The urine stream is otherwise satisfactory and I have recommended he see me again should he have any further concerns.  In particular I would wish to see him promptly should any of the following situations arise.  1.  Gross hematuria.  2.  Deterioration in his urinary symptoms.  3.  Evidence of further bladder infections    I did discuss the situation in careful detail with him today.  I answered all his questions    Plan.  I will see him on a as needed basis    Time.  10 minutes was spent in addition to the procedure noted to review the records, discussed the findings of cystoscopy, explained the findings to him, and discuss other urinary symptoms that were troubling him and then management.  \"This dictation was performed with voice recognition software and may contain errors,  omissions and inadvertent word substitution.\"      Again, thank you for allowing me to participate in the care of your patient.      Sincerely,    Wilmar Medrano MD      "

## 2018-04-25 NOTE — NURSING NOTE
Chief Complaint   Patient presents with     Cystoscopy     Pt here for cysto due to UTI's     Prior to the start of the procedure and with procedural staff participation, I verbally confirmed the patient s identity using two indicators, relevant allergies, that the procedure was appropriate and matched the consent or emergent situation, and that the correct equipment/implants were available. Immediately prior to starting the procedure I conducted the Time Out with the procedural staff and re-confirmed the patient s name, procedure, and site/side. I have wiped the patient off with the povidone-Iodine solution, draped them,  used Lidocaine hydrochloride jelly, and instilled sterile water into the bladder. (The Joint Commission universal protocol was followed.)  Yes    Sedation (Moderate or Deep): None  Radha White CMA

## 2018-04-25 NOTE — PROGRESS NOTES
This very pleasant 19-year-old gentleman returns today for cystoscopy.  He has had recurrent urinary tract infections with evidence of blood in the urine.  The blood was identified in the urine at the time of initial infection.  His postvoid residuals have been low.  The CT scan showed small stones in each kidney but no evidence of hydronephrosis, stones in the ureter, stones in the bladder or other remarkable features.     Procedure.  Cystoscopy.   Surgeon.    Marcela  Anesthesia.  Local anesthesia.  Description.  With the patient in the supine position, with the genital area prepped and draped in the customary fashion, with local anesthetic and the urethra, the flexible cystoscope was Inserted.  The penile urethra is normal.  The external sphincter is intact.  When viewed from verumontanum there is minimal enlargement of the lateral lobes of the prostate.  When viewed retrospectively there is no evidence of significant median lobe.  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  Ureteric openings are in satisfactory position and otherwise unremarkable.  There is very mild bladder trabeculation only.  There is no evidence of neoplasm or stone in the bladder.  There are no other remarkable features.    Impression.  The findings indicate no serious underlying cause for recurrent urinary infection.  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  We did discuss whether we should consider putting him on low-dose prophylactic antibiotic treatment for the time being and I decided, since he has had no symptoms now for a while that we will not do this at present.  We will however very strongly consider this should any further infections occur.  I do recommend, because he is troubled by mild nocturia ×3 that he try and reduce fluids in the evening, and he may want to drink decaffeinated sodas rather than regular sodas.  The urine stream is otherwise satisfactory and I have recommended he see me again  "should he have any further concerns.  In particular I would wish to see him promptly should any of the following situations arise.  1.  Gross hematuria.  2.  Deterioration in his urinary symptoms.  3.  Evidence of further bladder infections    I did discuss the situation in careful detail with him today.  I answered all his questions    Plan.  I will see him on a as needed basis    Time.  10 minutes was spent in addition to the procedure noted to review the records, discussed the findings of cystoscopy, explained the findings to him, and discuss other urinary symptoms that were troubling him and then management.  \"This dictation was performed with voice recognition software and may contain errors,  omissions and inadvertent word substitution.\"    "

## 2018-04-26 NOTE — TELEPHONE ENCOUNTER
Routing refill request to provider for review/approval because:  Medication is reported/historical  Mag-Ox, Please approve if appropriate  Selma Ortiz RN

## 2018-05-15 DIAGNOSIS — E16.2 HYPOGLYCEMIA: ICD-10-CM

## 2018-05-16 NOTE — TELEPHONE ENCOUNTER
"blood glucose monitoring (NO BRAND SPECIFIED) test strip 300 each 3 9/8/2016         Last Written Prescription Date:  09/08/2016  Last Fill Quantity: 300,  # refills: 3   Last office visit: 4/11/2018 with prescribing provider:     Future Office Visit: 07/12/2018  Next 5 appointments (look out 90 days)     Jul 12, 2018  6:30 PM CDT   [facility]   [facility]    [address] [address]   [phone]                 Requested Prescriptions   Pending Prescriptions Disp Refills     TRUE METRIX BLOOD GLUCOSE TEST test strip [Pharmacy Med Name: TRUE METRIX B/G TEST STRIPS 50'S] 300 strip 0     Sig: USE TO TEST BLOOD SUGAR THREE TIMES DAILY OR AS DIRECTED    Diabetic Supplies Protocol Passed    5/15/2018  6:40 PM       Passed - Patient is 18 years of age or older       Passed - Recent (6 mo) or future (30 days) visit within the authorizing provider's specialty    Patient had office visit in the last 6 months or has a visit in the next 30 days with authorizing provider.  See \"Patient Info\" tab in inbasket, or \"Choose Columns\" in Meds & Orders section of the refill encounter.              "

## 2018-05-17 RX ORDER — CALCIUM CITRATE/VITAMIN D3 200MG-6.25
TABLET ORAL
Qty: 300 STRIP | Refills: 0 | Status: SHIPPED | OUTPATIENT
Start: 2018-05-17 | End: 2018-06-04

## 2018-05-17 NOTE — TELEPHONE ENCOUNTER
Prescription approved per [facility] Refill Protocol.  Stacy MOORE RN

## 2018-05-25 DIAGNOSIS — R09.89 RUNNY NOSE: Primary | ICD-10-CM

## 2018-05-25 NOTE — TELEPHONE ENCOUNTER
Requested Prescriptions   Pending Prescriptions Disp Refills     ipratropium (ATROVENT) 0.03 % spray [Pharmacy Med Name: IPRATROPIUM 0.03% DANIEL SP 30ML (345)] 30 mL 0     Sig: INHALE 1 SPRAY IN EACH NOSTRIL EVERY 12 HOURS AS NEEDED    There is no refill protocol information for this order        ipratropium (ATROVENT) 0.03 % spray      Last Written Prescription Date:  ?  Last Fill Quantity: ?,   # refills: ?  Last Office Visit: 4/11/18 ([place])  Future Office visit:    Next 5 appointments (look out 90 days)     Jul 12, 2018  6:30 PM CDT   [facility]    [address] [address] [phone]                     Routing refill request to provider for review/approval because:  Drug not on the [facility], [facility] or  Health refill protocol or controlled substance    Medication is historical

## 2018-05-26 ENCOUNTER — HOSPITAL ENCOUNTER (INPATIENT)
Facility: CLINIC | Age: 83
LOS: 4 days | Discharge: HOME | DRG: 247 | End: 2018-05-31
Attending: EMERGENCY MEDICINE | Admitting: HOSPITALIST
Payer: MEDICARE

## 2018-05-26 ENCOUNTER — APPOINTMENT (OUTPATIENT)
Dept: CT IMAGING | Facility: CLINIC | Age: 83
DRG: 247 | End: 2018-05-26
Attending: EMERGENCY MEDICINE
Payer: MEDICARE

## 2018-05-26 ENCOUNTER — APPOINTMENT (OUTPATIENT)
Dept: GENERAL RADIOLOGY | Facility: CLINIC | Age: 83
DRG: 247 | End: 2018-05-26
Attending: EMERGENCY MEDICINE
Payer: MEDICARE

## 2018-05-26 DIAGNOSIS — I25.10 CORONARY ARTERY DISEASE INVOLVING NATIVE HEART, ANGINA PRESENCE UNSPECIFIED, UNSPECIFIED VESSEL OR LESION TYPE: Primary | ICD-10-CM

## 2018-05-26 DIAGNOSIS — R79.89 ELEVATED LACTIC ACID LEVEL: ICD-10-CM

## 2018-05-26 DIAGNOSIS — R07.9 ACUTE CHEST PAIN: ICD-10-CM

## 2018-05-26 DIAGNOSIS — R42 DIZZINESS: ICD-10-CM

## 2018-05-26 DIAGNOSIS — R11.2 NON-INTRACTABLE VOMITING WITH NAUSEA, UNSPECIFIED VOMITING TYPE: ICD-10-CM

## 2018-05-26 DIAGNOSIS — R09.02 HYPOXIA: ICD-10-CM

## 2018-05-26 LAB
ALBUMIN SERPL-MCNC: 3.5 G/DL (ref 3.4–5)
ALBUMIN UR-MCNC: 30 MG/DL
ALP SERPL-CCNC: 69 U/L (ref 40–150)
ALT SERPL W P-5'-P-CCNC: 18 U/L (ref 0–70)
ANION GAP SERPL CALCULATED.3IONS-SCNC: 12 MMOL/L (ref 3–14)
APPEARANCE UR: CLEAR
AST SERPL W P-5'-P-CCNC: 20 U/L (ref 0–45)
BASOPHILS # BLD AUTO: 0 10E9/L (ref 0–0.2)
BASOPHILS NFR BLD AUTO: 0.3 %
BILIRUB SERPL-MCNC: 0.3 MG/DL (ref 0.2–1.3)
BILIRUB UR QL STRIP: NEGATIVE
BUN SERPL-MCNC: 24 MG/DL (ref 7–30)
CALCIUM SERPL-MCNC: 8.8 MG/DL (ref 8.5–10.1)
CHLORIDE SERPL-SCNC: 106 MMOL/L (ref 94–109)
CO2 SERPL-SCNC: 23 MMOL/L (ref 20–32)
COLOR UR AUTO: YELLOW
CREAT SERPL-MCNC: 1.06 MG/DL (ref 0.66–1.25)
D DIMER PPP FEU-MCNC: 0.7 UG/ML FEU (ref 0–0.5)
DIFFERENTIAL METHOD BLD: ABNORMAL
EOSINOPHIL # BLD AUTO: 0.2 10E9/L (ref 0–0.7)
EOSINOPHIL NFR BLD AUTO: 1.7 %
ERYTHROCYTE [DISTWIDTH] IN BLOOD BY AUTOMATED COUNT: 14.2 % (ref 10–15)
GFR SERPL CREATININE-BSD FRML MDRD: 66 ML/MIN/1.7M2
GLUCOSE SERPL-MCNC: 183 MG/DL (ref 70–99)
GLUCOSE UR STRIP-MCNC: 70 MG/DL
HCT VFR BLD AUTO: 38.2 % (ref 40–53)
HGB BLD-MCNC: 12 G/DL (ref 13.3–17.7)
HGB UR QL STRIP: NEGATIVE
HYALINE CASTS #/AREA URNS LPF: 3 /LPF (ref 0–2)
IMM GRANULOCYTES # BLD: 0 10E9/L (ref 0–0.4)
IMM GRANULOCYTES NFR BLD: 0.3 %
INTERPRETATION ECG - MUSE: NORMAL
KETONES UR STRIP-MCNC: 5 MG/DL
LACTATE BLD-SCNC: 3.5 MMOL/L (ref 0.7–2)
LACTATE BLD-SCNC: 4.8 MMOL/L (ref 0.7–2)
LEUKOCYTE ESTERASE UR QL STRIP: ABNORMAL
LYMPHOCYTES # BLD AUTO: 4.2 10E9/L (ref 0.8–5.3)
LYMPHOCYTES NFR BLD AUTO: 44.4 %
MCH RBC QN AUTO: 29 PG (ref 26.5–33)
MCHC RBC AUTO-ENTMCNC: 31.4 G/DL (ref 31.5–36.5)
MCV RBC AUTO: 92 FL (ref 78–100)
MONOCYTES # BLD AUTO: 0.8 10E9/L (ref 0–1.3)
MONOCYTES NFR BLD AUTO: 7.9 %
MUCOUS THREADS #/AREA URNS LPF: PRESENT /LPF
NEUTROPHILS # BLD AUTO: 4.3 10E9/L (ref 1.6–8.3)
NEUTROPHILS NFR BLD AUTO: 45.4 %
NITRATE UR QL: NEGATIVE
NRBC # BLD AUTO: 0 10*3/UL
NRBC BLD AUTO-RTO: 0 /100
PH UR STRIP: 5.5 PH (ref 5–7)
PLATELET # BLD AUTO: 189 10E9/L (ref 150–450)
POTASSIUM SERPL-SCNC: 4.4 MMOL/L (ref 3.4–5.3)
PROT SERPL-MCNC: 7 G/DL (ref 6.8–8.8)
RBC # BLD AUTO: 4.14 10E12/L (ref 4.4–5.9)
RBC #/AREA URNS AUTO: 5 /HPF (ref 0–2)
SODIUM SERPL-SCNC: 141 MMOL/L (ref 133–144)
SOURCE: ABNORMAL
SP GR UR STRIP: 1.02 (ref 1–1.03)
SQUAMOUS #/AREA URNS AUTO: 1 /HPF (ref 0–1)
TROPONIN I SERPL-MCNC: 0.02 UG/L (ref 0–0.04)
UROBILINOGEN UR STRIP-MCNC: NORMAL MG/DL (ref 0–2)
WBC # BLD AUTO: 9.5 10E9/L (ref 4–11)
WBC #/AREA URNS AUTO: 3 /HPF (ref 0–5)

## 2018-05-26 PROCEDURE — 84484 ASSAY OF TROPONIN QUANT: CPT | Performed by: EMERGENCY MEDICINE

## 2018-05-26 PROCEDURE — 36415 COLL VENOUS BLD VENIPUNCTURE: CPT | Performed by: EMERGENCY MEDICINE

## 2018-05-26 PROCEDURE — 96367 TX/PROPH/DG ADDL SEQ IV INF: CPT

## 2018-05-26 PROCEDURE — 85025 COMPLETE CBC W/AUTO DIFF WBC: CPT | Performed by: EMERGENCY MEDICINE

## 2018-05-26 PROCEDURE — 96361 HYDRATE IV INFUSION ADD-ON: CPT

## 2018-05-26 PROCEDURE — 70450 CT HEAD/BRAIN W/O DYE: CPT

## 2018-05-26 PROCEDURE — 25000128 H RX IP 250 OP 636: Performed by: EMERGENCY MEDICINE

## 2018-05-26 PROCEDURE — 80053 COMPREHEN METABOLIC PANEL: CPT | Performed by: EMERGENCY MEDICINE

## 2018-05-26 PROCEDURE — 25000131 ZZH RX MED GY IP 250 OP 636 PS 637: Mod: GY | Performed by: EMERGENCY MEDICINE

## 2018-05-26 PROCEDURE — 96376 TX/PRO/DX INJ SAME DRUG ADON: CPT

## 2018-05-26 PROCEDURE — A9270 NON-COVERED ITEM OR SERVICE: HCPCS | Mod: GY | Performed by: EMERGENCY MEDICINE

## 2018-05-26 PROCEDURE — 25000125 ZZHC RX 250: Performed by: EMERGENCY MEDICINE

## 2018-05-26 PROCEDURE — 71260 CT THORAX DX C+: CPT

## 2018-05-26 PROCEDURE — 71046 X-RAY EXAM CHEST 2 VIEWS: CPT

## 2018-05-26 PROCEDURE — 99292 CRITICAL CARE ADDL 30 MIN: CPT

## 2018-05-26 PROCEDURE — 85379 FIBRIN DEGRADATION QUANT: CPT | Performed by: EMERGENCY MEDICINE

## 2018-05-26 PROCEDURE — 87040 BLOOD CULTURE FOR BACTERIA: CPT | Performed by: EMERGENCY MEDICINE

## 2018-05-26 PROCEDURE — 83605 ASSAY OF LACTIC ACID: CPT | Performed by: EMERGENCY MEDICINE

## 2018-05-26 PROCEDURE — 84145 PROCALCITONIN (PCT): CPT | Performed by: EMERGENCY MEDICINE

## 2018-05-26 PROCEDURE — 93005 ELECTROCARDIOGRAM TRACING: CPT | Mod: 76

## 2018-05-26 PROCEDURE — 99291 CRITICAL CARE FIRST HOUR: CPT | Mod: 25

## 2018-05-26 PROCEDURE — 87086 URINE CULTURE/COLONY COUNT: CPT | Performed by: HOSPITALIST

## 2018-05-26 PROCEDURE — 81001 URINALYSIS AUTO W/SCOPE: CPT | Performed by: EMERGENCY MEDICINE

## 2018-05-26 PROCEDURE — 93005 ELECTROCARDIOGRAM TRACING: CPT

## 2018-05-26 PROCEDURE — 96375 TX/PRO/DX INJ NEW DRUG ADDON: CPT

## 2018-05-26 RX ORDER — ONDANSETRON 2 MG/ML
4 INJECTION INTRAMUSCULAR; INTRAVENOUS EVERY 30 MIN PRN
Status: DISCONTINUED | OUTPATIENT
Start: 2018-05-26 | End: 2018-05-31 | Stop reason: HOSPADM

## 2018-05-26 RX ORDER — IOPAMIDOL 755 MG/ML
64 INJECTION, SOLUTION INTRAVASCULAR ONCE
Status: COMPLETED | OUTPATIENT
Start: 2018-05-26 | End: 2018-05-26

## 2018-05-26 RX ORDER — PIPERACILLIN SODIUM, TAZOBACTAM SODIUM 4; .5 G/20ML; G/20ML
4.5 INJECTION, POWDER, LYOPHILIZED, FOR SOLUTION INTRAVENOUS ONCE
Status: COMPLETED | OUTPATIENT
Start: 2018-05-26 | End: 2018-05-26

## 2018-05-26 RX ORDER — ACETAMINOPHEN 500 MG
1000 TABLET ORAL ONCE
Status: DISCONTINUED | OUTPATIENT
Start: 2018-05-26 | End: 2018-05-28

## 2018-05-26 RX ORDER — MECLIZINE HYDROCHLORIDE 25 MG/1
50 TABLET ORAL ONCE
Status: COMPLETED | OUTPATIENT
Start: 2018-05-26 | End: 2018-05-26

## 2018-05-26 RX ADMIN — SODIUM CHLORIDE 1000 ML: 9 INJECTION, SOLUTION INTRAVENOUS at 20:52

## 2018-05-26 RX ADMIN — IOPAMIDOL 64 ML: 755 INJECTION, SOLUTION INTRAVENOUS at 23:45

## 2018-05-26 RX ADMIN — SODIUM CHLORIDE 90 ML: 9 INJECTION, SOLUTION INTRAVENOUS at 23:45

## 2018-05-26 RX ADMIN — PIPERACILLIN SODIUM,TAZOBACTAM SODIUM 4.5 G: 4; .5 INJECTION, POWDER, FOR SOLUTION INTRAVENOUS at 21:59

## 2018-05-26 RX ADMIN — ONDANSETRON 4 MG: 2 INJECTION INTRAMUSCULAR; INTRAVENOUS at 23:53

## 2018-05-26 RX ADMIN — SODIUM CHLORIDE 1205 ML: 9 INJECTION, SOLUTION INTRAVENOUS at 21:48

## 2018-05-26 RX ADMIN — ONDANSETRON 4 MG: 2 INJECTION INTRAMUSCULAR; INTRAVENOUS at 21:09

## 2018-05-26 RX ADMIN — MECLIZINE HYDROCHLORIDE 50 MG: 25 TABLET ORAL at 21:12

## 2018-05-26 RX ADMIN — SODIUM CHLORIDE 1000 ML: 9 INJECTION, SOLUTION INTRAVENOUS at 23:54

## 2018-05-26 NOTE — LETTER
Transition Communication Hand-off for Care Transitions to Next Level of Care Provider    Name: Dustin Pearce  : 1928  MRN #: [id number]   Primary Care Provider: Matt Morrissey     Primary Clinic: [address]  [place] [address]     Reason for Hospitalization:  Dizziness [R42]  Hypoxia [R09.02]  Acute chest pain [R07.9]  Elevated lactic acid level [R79.89]  Non-intractable vomiting with nausea, unspecified vomiting type [R11.2]  Admit Date/Time: 2018  8:37 PM  Discharge Date: 2018  Payor Source: Payor: MEDICARE / Plan: MEDICARE / Product Type: Medicare /     Readmission Assessment Measure (LICHA) Risk Score/category: Elevated           Reason for Communication Hand-off Referral: Fragility    Discharge Plan:       Concern for non-adherence with plan of care:   Y/N yes  Discharge Needs Assessment:  Needs       Most Recent Value    Equipment Currently Used at Home cane, straight, grab bar, shower chair, raised toilet    Transportation Available family or friend will provide [pt relies on spouse for driving]          Follow-up specialty is recommended: Yes    Follow-up plan:  Future Appointments  Date Time Provider Department Center   2018 8:00 AM 2[facility]   2018 12:30 PM Catherine Laurent APRN CNP [facility]   2018 2:00 PM [facility]   2018 6:30 PM Matt Morrissey MD [facility]    2018 10:45 AM Rafa Samuel MD [facility]       Any outstanding tests or procedures:        Referrals     Future Labs/Procedures    CARDIAC REHAB REFERRAL     Comments:    Patient may choose their preference of the site for Cardiac Rehab.            Key Recommendations:  Patient was admitted with a NSTEMI. On  he had an Angiogram receiving 2 stents, one to LAD and One to his left circumflex. He is being discharge home. Outpatient Cardiac Rehab is scheduled for patient as well as follow up from Cardiology. He did have  elevated Blood pressures and his medications were increased, Toprol XL and Lisinopril.    Breanne Tolbert    AVS/Discharge Summary is the source of truth; this is a helpful guide for improved communication of patient story

## 2018-05-26 NOTE — IP AVS SNAPSHOT
[facility]    [address]    [place] [address]    Phone:  [phone]                                       After Visit Summary   5/26/2018    Dustin Pearce    MRN: [id number]           After Visit Summary Signature Page     I have received my discharge instructions, and my questions have been answered. I have discussed any challenges I see with this plan with the nurse or doctor.    ..........................................................................................................................................  Patient/Patient Representative Signature      ..........................................................................................................................................  Patient Representative Print Name and Relationship to Patient    ..................................................               ................................................  Date                                            Time    ..........................................................................................................................................  Reviewed by Signature/Title    ...................................................              ..............................................  Date                                                            Time

## 2018-05-26 NOTE — IP AVS SNAPSHOT
MRN:[id number]                      After Visit Summary   5/26/2018    Dustin Pearce    MRN: [id number]           Thank you!     Thank you for choosing [facility] for your care. Our goal is always to provide you with excellent care. Hearing back from our patients is one way we can continue to improve our services. Please take a few minutes to complete the written survey that you may receive in the mail after you visit with us. Thank you!        Patient Information     Date Of Birth          1/31/1928        Designated Caregiver       Most Recent Value    Caregiver    Will someone help with your care after discharge? 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  For non-urgent questions about your medical care, please call your primary care provider or clinic, [phone]          Attending Provider     Provider Specialty    Sybil Fontaine MD Emergency Medicine    [facility], Amilcar Garvey MD Internal Medicine       Primary Care Provider Office Phone # Fax #    Matt Morrissey [phone]      After Care Instructions     Activity       Your activity upon discharge: activity as tolerated            Diet       Follow this diet upon discharge:      Combination Diet 2524-2998 Calories: Moderate Consistent CHO (4-6 CHO units/meal)            Discharge Instructions       Do not restart metformin until 6/1                  Follow-up Appointments     Follow-up and recommended labs and tests        Follow up appointment  with Cardiology, ASPEN Whitten on 6/8/18 at 12:30 pm. Also scheduled an appointment with Dr. Samuel on 8/31/18 at 10:45 pm.  Follow up within 7 days with your primary care provider for post-hospitalization check up.                  Your next 10 appointments already scheduled     Jun 05, 2018  8:00 AM CDT   Cardiac Evaluation with  Cardiac Rehab 2   [facility] ([facility])    [address]  [facility] [address]   [phone]            Jun 08, 2018 12:30 PM CDT   Return Discharge with ASPEN Rosales [facility] ([facility])    [id number]  [facility] [address] [phone] OPT 2            Jun 08, 2018  2:00 PM CDT   Office Visit with Matt Morrissey MD   [facility]    [address] [address] [phone]           Bring a current list of meds and any records pertaining to this visit. For Physicals, please bring immunization records and any forms needing to be filled out. Please arrive 10 minutes early to complete paperwork.            Jul 12, 2018  6:30 PM CDT   Office Visit with Matt Morrissey MD   [facility]    [address] [address] [phone]           Bring a current list of meds and any records pertaining to this visit. For Physicals, please bring immunization records and any forms needing to be filled out. Please arrive 10 minutes early to complete paperwork.            Aug 31, 2018 10:45 AM CDT   Return Visit with Rafa Samuel MD   [facility] ([facility])    [address] [address] [phone] OPT 2              Additional Services     CARDIAC REHAB REFERRAL       Patient may choose their preference of the site for Cardiac Rehab.                  Further instructions from  "your care team       If you have recurrence of symptoms, chest pressure, or increased SOB seek medical attention.     Follow up with Cardiology within one week of discharge from hospital.     Pending Results     Date and Time Order Name Status Description    2018 Blood culture Preliminary     2018 Blood culture Preliminary             Statement of Approval     Ordered          18 1522  I have reviewed and agree with all the recommendations and orders detailed in this document.  EFFECTIVE NOW     Approved and electronically signed by:  Brandyn Graves PA             Admission Information     Date & Time Provider Department Dept. Phone    2018 Amilcar Sharma MD [facility] Coronary Care Unit [phone]      Your Vitals Were     Blood Pressure Pulse Temperature Respirations Height Weight    124/51 (BP Location: Left arm) 66 98.3  F (36.8  C) (Oral) 18 1.676 m (5' 6\") 73.1 kg (161 lb 3.2 oz)    Pulse Oximetry BMI (Body Mass Index)                94% 26.02 kg/m2          Bioscan Information     Bioscan lets you send messages to your doctor, view your test results, renew your prescriptions, schedule appointments and more. To sign up, go to [email or web address] . Click on \"Log in\" on the left side of the screen, which will take you to the Welcome page. Then click on \"Sign up Now\" on the right side of the page.     You will be asked to enter the access code listed below, as well as some personal information. Please follow the directions to create your username and password.     Your access code is: [email or web address]  Expires: 7/10/2018  6:16 PM     Your access code will  in 90 days. If you need help or a new code, please call your [facility] clinic or [phone].        Care EveryWhere ID     This is your Care EveryWhere ID. This could be used by other organizations to access your [facility] medical records  [id number]        Equal Access to Services     LAURENCE JACOBSON " AH: Hadii kinza markhammorispatricia Sogilbertali, waaxda luqadaha, qaybta kaalmada adeglenna, maurice jose alejandroin hayaaafsihn canashelena abreu natan miranda. So [facility] [phone].    ATENCIÓN: Si habla español, tiene a garvey disposición servicios gratuitos de asistencia lingüística. Llame al [phone].    We comply with applicable federal civil rights laws and [place] laws. We do not discriminate on the basis of race, color, national origin, age, disability, sex, sexual orientation, or gender identity.               Review of your medicines      START taking        Dose / Directions    isosorbide mononitrate 30 MG 24 hr tablet   Commonly known as:  IMDUR   Used for:  Coronary artery disease involving native heart, angina presence unspecified, unspecified vessel or lesion type        Dose:  30 mg   Start taking on:  6/1/2018   Take 1 tablet (30 mg) by mouth daily   Quantity:  30 tablet   Refills:  1       metoprolol tartrate 50 MG tablet   Commonly known as:  LOPRESSOR   Used for:  Coronary artery disease involving native heart, angina presence unspecified, unspecified vessel or lesion type        Dose:  50 mg   Take 1 tablet (50 mg) by mouth 2 times daily   Quantity:  60 tablet   Refills:  1       nitroGLYcerin 0.4 MG sublingual tablet   Commonly known as:  NITROSTAT   Used for:  Coronary artery disease involving native heart, angina presence unspecified, unspecified vessel or lesion type        For chest pain place 1 tablet under the tongue every 5 minutes for 3 doses. If symptoms persist 5 minutes after 1st dose call 911.   Quantity:  25 tablet   Refills:  0         CONTINUE these medicines which may have CHANGED, or have new prescriptions. If we are uncertain of the size of tablets/capsules you have at home, strength may be listed as something that might have changed.        Dose / Directions    ipratropium 0.03 % spray   Commonly known as:  ATROVENT   This may have changed:  See the new instructions.   Used for:  Runny nose        INHALE 1 SPRAY  IN EACH NOSTRIL EVERY 12 HOURS AS NEEDED   Quantity:  30 mL   Refills:  0       lisinopril 5 MG tablet   Commonly known as:  PRINIVIL/ZESTRIL   This may have changed:    - medication strength  - how much to take   Used for:  Coronary artery disease involving native heart, angina presence unspecified, unspecified vessel or lesion type        Dose:  5 mg   Start taking on:  6/1/2018   Take 1 tablet (5 mg) by mouth daily   Quantity:  30 tablet   Refills:  1         CONTINUE these medicines which have NOT CHANGED        Dose / Directions    AMITRIPTYLINE HCL PO        Dose:  25 mg   Take 25 mg by mouth nightly as needed for sleep   Refills:  0       aspirin 81 MG EC tablet   Used for:  Transient cerebral ischemia, unspecified type        Dose:  81 mg   Take 1 tablet (81 mg) by mouth daily   Quantity:  30 tablet   Refills:  0       ATORVASTATIN CALCIUM PO        Dose:  40 mg   Take 40 mg by mouth At Bedtime   Refills:  0       bisacodyl 10 MG Suppository   Commonly known as:  DULCOLAX        Dose:  10 mg   Place 10 mg rectally daily as needed for constipation   Refills:  0       Cholecalciferol 70600 units Tabs        Dose:  1 tablet   Take 1 tablet by mouth three times a week   Refills:  0       DULoxetine 30 MG EC capsule   Commonly known as:  CYMBALTA   Used for:  Polyneuropathy associated with underlying disease (H)        Dose:  30 mg   Take 1 capsule (30 mg) by mouth daily   Quantity:  90 capsule   Refills:  3       glipiZIDE 10 MG tablet   Commonly known as:  GLUCOTROL   Used for:  Type 2 diabetes mellitus with diabetic peripheral angiopathy without gangrene, without long-term current use of insulin (H)        Dose:  10 mg   Take 1 tablet (10 mg) by mouth 2 times daily (before meals)   Quantity:  180 tablet   Refills:  3       magnesium oxide 400 (241.3 Mg) MG tablet   Commonly known as:  MAG-OX   Used for:  Constipation, unspecified constipation type        TAKE 1 TABLET BY MOUTH TWICE DAILY   Quantity:  60  tablet   Refills:  0       METFORMIN HCL PO        Dose:  1000 mg   Take 1,000 mg by mouth 2 times daily (with meals)   Refills:  0       OMEPRAZOLE PO        Dose:  40 mg   Take 40 mg by mouth 2 times daily   Refills:  0       order for DME   Used for:  Type 2 diabetes mellitus without complication (H)        Equipment being ordered: True Matrix Blood Glucose meter.   Quantity:  1 Device   Refills:  0       PLAVIX PO   Used for:  Cough        Dose:  75 mg   Take 75 mg by mouth daily   Refills:  0       polyethylene glycol Packet   Commonly known as:  MIRALAX/GLYCOLAX        Dose:  17 g   Take 17 g by mouth daily as needed for constipation   Refills:  0       tamsulosin 0.4 MG capsule   Commonly known as:  FLOMAX   Used for:  Benign non-nodular prostatic hyperplasia with lower urinary tract symptoms        Dose:  0.4 mg   Take 1 capsule (0.4 mg) by mouth daily   Quantity:  90 capsule   Refills:  3       TRUE METRIX BLOOD GLUCOSE TEST test strip   Used for:  Hypoglycemia   Generic drug:  blood glucose monitoring        USE TO TEST BLOOD SUGAR THREE TIMES DAILY OR AS DIRECTED   Quantity:  300 strip   Refills:  0         STOP taking     metoprolol succinate 25 MG 24 hr tablet   Commonly known as:  TOPROL-XL                Where to get your medicines      These medications were sent to [facility] [place], [place] - [address]  [address] [facility] [address], [facility] [address]     Phone:  [phone]     isosorbide mononitrate 30 MG 24 hr tablet    lisinopril 5 MG tablet    metoprolol tartrate 50 MG tablet    nitroGLYcerin 0.4 MG sublingual tablet         These medications were sent to [facility] [address] [place] [address] [address] [facility] [address] [facility] [address]     Phone:  [phone]     ipratropium 0.03 % spray                Protect others around you: Learn how to safely use, store and throw away your medicines at www.disposemymeds.org.              Medication List: This is a list of all your medications and when to take them. Check marks below indicate your daily home schedule. Keep this list as a reference.      Medications           Morning Afternoon Evening Bedtime As Needed    AMITRIPTYLINE HCL PO   Take 25 mg by mouth nightly as needed for sleep                                   aspirin 81 MG EC tablet   Take 1 tablet (81 mg) by mouth daily   Last time this was given:  81 mg on 5/31/2018  8:22 AM                                   ATORVASTATIN CALCIUM PO   Take 40 mg by mouth At Bedtime   Last time this was given:  40 mg on 5/31/2018  8:22 AM                                   bisacodyl 10 MG Suppository   Commonly known as:  DULCOLAX   Place 10 mg rectally daily as needed for constipation                                   Cholecalciferol 99036 units Tabs   Take 1 tablet by mouth three times a week                                   DULoxetine 30 MG EC capsule   Commonly known as:  CYMBALTA   Take 1 capsule (30 mg) by mouth daily                                   glipiZIDE 10 MG tablet   Commonly known as:  GLUCOTROL   Take 1 tablet (10 mg) by mouth 2 times daily (before meals)                                      ipratropium 0.03 % spray   Commonly known as:  ATROVENT   INHALE 1 SPRAY IN EACH NOSTRIL EVERY 12 HOURS AS NEEDED                                      isosorbide mononitrate 30 MG 24 hr tablet   Commonly known as:  IMDUR   Take 1 tablet (30 mg) by mouth daily   Start taking on:  6/1/2018   Last time this was given:  30 mg on 5/31/2018  8:25 AM                                   lisinopril 5 MG tablet   Commonly known as:  PRINIVIL/ZESTRIL   Take 1 tablet (5 mg) by mouth daily   Start taking on:  6/1/2018   Last time this was given:  5 mg on 5/31/2018  8:25 AM                                   magnesium oxide 400 (241.3 Mg) MG tablet   Commonly known as:  MAG-OX   TAKE 1 TABLET BY MOUTH TWICE DAILY   Last time this was given:  400 mg on 5/31/2018   8:22 AM                                   METFORMIN HCL PO   Take 1,000 mg by mouth 2 times daily (with meals)                                      metoprolol tartrate 50 MG tablet   Commonly known as:  LOPRESSOR   Take 1 tablet (50 mg) by mouth 2 times daily   Last time this was given:  50 mg on 5/31/2018 10:56 AM                                      nitroGLYcerin 0.4 MG sublingual tablet   Commonly known as:  NITROSTAT   For chest pain place 1 tablet under the tongue every 5 minutes for 3 doses. If symptoms persist 5 minutes after 1st dose call 911.   Last time this was given:  0.4 mg on 5/28/2018 10:27 PM                                   OMEPRAZOLE PO   Take 40 mg by mouth 2 times daily                                      order for DME   Equipment being ordered: True [employer] Blood Glucose meter.                                PLAVIX PO   Take 75 mg by mouth daily   Last time this was given:  75 mg on 5/31/2018  8:22 AM                                   polyethylene glycol Packet   Commonly known as:  MIRALAX/GLYCOLAX   Take 17 g by mouth daily as needed for constipation                                   tamsulosin 0.4 MG capsule   Commonly known as:  FLOMAX   Take 1 capsule (0.4 mg) by mouth daily                                   TRUE METRIX BLOOD GLUCOSE TEST test strip   USE TO TEST BLOOD SUGAR THREE TIMES DAILY OR AS DIRECTED   Generic drug:  blood glucose monitoring

## 2018-05-27 ENCOUNTER — APPOINTMENT (OUTPATIENT)
Dept: CARDIOLOGY | Facility: CLINIC | Age: 83
DRG: 247 | End: 2018-05-27
Attending: HOSPITALIST
Payer: MEDICARE

## 2018-05-27 ENCOUNTER — APPOINTMENT (OUTPATIENT)
Dept: CT IMAGING | Facility: CLINIC | Age: 83
DRG: 247 | End: 2018-05-27
Attending: EMERGENCY MEDICINE
Payer: MEDICARE

## 2018-05-27 PROBLEM — A41.9 SEVERE SEPSIS (H): Status: ACTIVE | Noted: 2018-05-27

## 2018-05-27 PROBLEM — R65.20 SEVERE SEPSIS (H): Status: ACTIVE | Noted: 2018-05-27

## 2018-05-27 LAB
ALBUMIN SERPL-MCNC: 2.9 G/DL (ref 3.4–5)
ALP SERPL-CCNC: 60 U/L (ref 40–150)
ALT SERPL W P-5'-P-CCNC: 15 U/L (ref 0–70)
ANION GAP SERPL CALCULATED.3IONS-SCNC: 3 MMOL/L (ref 3–14)
ANION GAP SERPL CALCULATED.3IONS-SCNC: 5 MMOL/L (ref 3–14)
AST SERPL W P-5'-P-CCNC: 18 U/L (ref 0–45)
BASOPHILS # BLD AUTO: 0 10E9/L (ref 0–0.2)
BASOPHILS NFR BLD AUTO: 0.1 %
BILIRUB SERPL-MCNC: 0.3 MG/DL (ref 0.2–1.3)
BUN SERPL-MCNC: 13 MG/DL (ref 7–30)
BUN SERPL-MCNC: 20 MG/DL (ref 7–30)
CALCIUM SERPL-MCNC: 8 MG/DL (ref 8.5–10.1)
CALCIUM SERPL-MCNC: 8 MG/DL (ref 8.5–10.1)
CHLORIDE SERPL-SCNC: 112 MMOL/L (ref 94–109)
CHLORIDE SERPL-SCNC: 112 MMOL/L (ref 94–109)
CO2 SERPL-SCNC: 25 MMOL/L (ref 20–32)
CO2 SERPL-SCNC: 29 MMOL/L (ref 20–32)
CREAT SERPL-MCNC: 0.8 MG/DL (ref 0.66–1.25)
CREAT SERPL-MCNC: 0.84 MG/DL (ref 0.66–1.25)
DIFFERENTIAL METHOD BLD: ABNORMAL
EOSINOPHIL # BLD AUTO: 0.1 10E9/L (ref 0–0.7)
EOSINOPHIL NFR BLD AUTO: 0.9 %
ERYTHROCYTE [DISTWIDTH] IN BLOOD BY AUTOMATED COUNT: 14.3 % (ref 10–15)
GFR SERPL CREATININE-BSD FRML MDRD: 85 ML/MIN/1.7M2
GFR SERPL CREATININE-BSD FRML MDRD: >90 ML/MIN/1.7M2
GLUCOSE BLDC GLUCOMTR-MCNC: 164 MG/DL (ref 70–99)
GLUCOSE BLDC GLUCOMTR-MCNC: 180 MG/DL (ref 70–99)
GLUCOSE BLDC GLUCOMTR-MCNC: 195 MG/DL (ref 70–99)
GLUCOSE BLDC GLUCOMTR-MCNC: 278 MG/DL (ref 70–99)
GLUCOSE SERPL-MCNC: 163 MG/DL (ref 70–99)
GLUCOSE SERPL-MCNC: 195 MG/DL (ref 70–99)
HBA1C MFR BLD: 9.1 % (ref 0–5.6)
HCT VFR BLD AUTO: 33.5 % (ref 40–53)
HGB BLD-MCNC: 10.7 G/DL (ref 13.3–17.7)
IMM GRANULOCYTES # BLD: 0 10E9/L (ref 0–0.4)
IMM GRANULOCYTES NFR BLD: 0.3 %
LACTATE BLD-SCNC: 2.1 MMOL/L (ref 0.7–2)
LMWH PPP CHRO-ACNC: 0.35 IU/ML
LYMPHOCYTES # BLD AUTO: 2.5 10E9/L (ref 0.8–5.3)
LYMPHOCYTES NFR BLD AUTO: 33.3 %
MAGNESIUM SERPL-MCNC: 1.5 MG/DL (ref 1.6–2.3)
MCH RBC QN AUTO: 29.3 PG (ref 26.5–33)
MCHC RBC AUTO-ENTMCNC: 31.9 G/DL (ref 31.5–36.5)
MCV RBC AUTO: 92 FL (ref 78–100)
MONOCYTES # BLD AUTO: 0.8 10E9/L (ref 0–1.3)
MONOCYTES NFR BLD AUTO: 10.5 %
NEUTROPHILS # BLD AUTO: 4.2 10E9/L (ref 1.6–8.3)
NEUTROPHILS NFR BLD AUTO: 54.9 %
NRBC # BLD AUTO: 0 10*3/UL
NRBC BLD AUTO-RTO: 0 /100
PLATELET # BLD AUTO: 144 10E9/L (ref 150–450)
POTASSIUM SERPL-SCNC: 4.3 MMOL/L (ref 3.4–5.3)
POTASSIUM SERPL-SCNC: 4.8 MMOL/L (ref 3.4–5.3)
PROCALCITONIN SERPL-MCNC: <0.05 NG/ML
PROT SERPL-MCNC: 5.8 G/DL (ref 6.8–8.8)
RBC # BLD AUTO: 3.65 10E12/L (ref 4.4–5.9)
SODIUM SERPL-SCNC: 142 MMOL/L (ref 133–144)
SODIUM SERPL-SCNC: 144 MMOL/L (ref 133–144)
TROPONIN I BLD-MCNC: 0 UG/L (ref 0–0.1)
TROPONIN I SERPL-MCNC: 0.12 UG/L (ref 0–0.04)
TROPONIN I SERPL-MCNC: 0.9 UG/L (ref 0–0.04)
TROPONIN I SERPL-MCNC: 1.66 UG/L (ref 0–0.04)
TROPONIN I SERPL-MCNC: 2.08 UG/L (ref 0–0.04)
TROPONIN I SERPL-MCNC: 2.33 UG/L (ref 0–0.04)
WBC # BLD AUTO: 7.6 10E9/L (ref 4–11)

## 2018-05-27 PROCEDURE — 93308 TTE F-UP OR LMTD: CPT | Mod: 26 | Performed by: INTERNAL MEDICINE

## 2018-05-27 PROCEDURE — 80048 BASIC METABOLIC PNL TOTAL CA: CPT | Performed by: INTERNAL MEDICINE

## 2018-05-27 PROCEDURE — 25000128 H RX IP 250 OP 636: Performed by: EMERGENCY MEDICINE

## 2018-05-27 PROCEDURE — 83735 ASSAY OF MAGNESIUM: CPT | Performed by: INTERNAL MEDICINE

## 2018-05-27 PROCEDURE — 96365 THER/PROPH/DIAG IV INF INIT: CPT | Mod: 59

## 2018-05-27 PROCEDURE — 93010 ELECTROCARDIOGRAM REPORT: CPT | Performed by: INTERNAL MEDICINE

## 2018-05-27 PROCEDURE — 84484 ASSAY OF TROPONIN QUANT: CPT | Performed by: HOSPITALIST

## 2018-05-27 PROCEDURE — 99222 1ST HOSP IP/OBS MODERATE 55: CPT | Mod: 25 | Performed by: INTERNAL MEDICINE

## 2018-05-27 PROCEDURE — 25000132 ZZH RX MED GY IP 250 OP 250 PS 637: Mod: GY | Performed by: INTERNAL MEDICINE

## 2018-05-27 PROCEDURE — 99223 1ST HOSP IP/OBS HIGH 75: CPT | Mod: AI | Performed by: HOSPITALIST

## 2018-05-27 PROCEDURE — 93325 DOPPLER ECHO COLOR FLOW MAPG: CPT

## 2018-05-27 PROCEDURE — 80053 COMPREHEN METABOLIC PANEL: CPT | Performed by: HOSPITALIST

## 2018-05-27 PROCEDURE — 93005 ELECTROCARDIOGRAM TRACING: CPT

## 2018-05-27 PROCEDURE — 85520 HEPARIN ASSAY: CPT | Performed by: INTERNAL MEDICINE

## 2018-05-27 PROCEDURE — 84484 ASSAY OF TROPONIN QUANT: CPT | Performed by: INTERNAL MEDICINE

## 2018-05-27 PROCEDURE — 84484 ASSAY OF TROPONIN QUANT: CPT

## 2018-05-27 PROCEDURE — 93321 DOPPLER ECHO F-UP/LMTD STD: CPT | Mod: 26 | Performed by: INTERNAL MEDICINE

## 2018-05-27 PROCEDURE — 25000131 ZZH RX MED GY IP 250 OP 636 PS 637: Mod: GY | Performed by: HOSPITALIST

## 2018-05-27 PROCEDURE — A9270 NON-COVERED ITEM OR SERVICE: HCPCS | Mod: GY | Performed by: INTERNAL MEDICINE

## 2018-05-27 PROCEDURE — 25000128 H RX IP 250 OP 636: Performed by: HOSPITALIST

## 2018-05-27 PROCEDURE — 96366 THER/PROPH/DIAG IV INF ADDON: CPT

## 2018-05-27 PROCEDURE — 83605 ASSAY OF LACTIC ACID: CPT | Performed by: HOSPITALIST

## 2018-05-27 PROCEDURE — 25000125 ZZHC RX 250: Performed by: INTERNAL MEDICINE

## 2018-05-27 PROCEDURE — 25500064 ZZH RX 255 OP 636: Performed by: HOSPITALIST

## 2018-05-27 PROCEDURE — 96375 TX/PRO/DX INJ NEW DRUG ADDON: CPT

## 2018-05-27 PROCEDURE — 85025 COMPLETE CBC W/AUTO DIFF WBC: CPT | Performed by: HOSPITALIST

## 2018-05-27 PROCEDURE — 83036 HEMOGLOBIN GLYCOSYLATED A1C: CPT | Performed by: HOSPITALIST

## 2018-05-27 PROCEDURE — 93010 ELECTROCARDIOGRAM REPORT: CPT | Mod: 77 | Performed by: INTERNAL MEDICINE

## 2018-05-27 PROCEDURE — 36415 COLL VENOUS BLD VENIPUNCTURE: CPT | Performed by: INTERNAL MEDICINE

## 2018-05-27 PROCEDURE — 00000146 ZZHCL STATISTIC GLUCOSE BY METER IP

## 2018-05-27 PROCEDURE — 25000128 H RX IP 250 OP 636: Performed by: INTERNAL MEDICINE

## 2018-05-27 PROCEDURE — 21000001 ZZH R&B HEART CARE

## 2018-05-27 PROCEDURE — 36415 COLL VENOUS BLD VENIPUNCTURE: CPT | Performed by: HOSPITALIST

## 2018-05-27 PROCEDURE — 93325 DOPPLER ECHO COLOR FLOW MAPG: CPT | Mod: 26 | Performed by: INTERNAL MEDICINE

## 2018-05-27 PROCEDURE — 74176 CT ABD & PELVIS W/O CONTRAST: CPT

## 2018-05-27 RX ORDER — PIPERACILLIN SODIUM, TAZOBACTAM SODIUM 3; .375 G/15ML; G/15ML
3.38 INJECTION, POWDER, LYOPHILIZED, FOR SOLUTION INTRAVENOUS EVERY 6 HOURS
Status: DISCONTINUED | OUTPATIENT
Start: 2018-05-27 | End: 2018-05-28

## 2018-05-27 RX ORDER — PROCHLORPERAZINE 25 MG
12.5 SUPPOSITORY, RECTAL RECTAL EVERY 12 HOURS PRN
Status: DISCONTINUED | OUTPATIENT
Start: 2018-05-27 | End: 2018-05-31 | Stop reason: HOSPADM

## 2018-05-27 RX ORDER — METOPROLOL TARTRATE 25 MG/1
25 TABLET, FILM COATED ORAL 2 TIMES DAILY
Status: DISCONTINUED | OUTPATIENT
Start: 2018-05-28 | End: 2018-05-29

## 2018-05-27 RX ORDER — NICOTINE POLACRILEX 4 MG
15-30 LOZENGE BUCCAL
Status: DISCONTINUED | OUTPATIENT
Start: 2018-05-27 | End: 2018-05-31 | Stop reason: HOSPADM

## 2018-05-27 RX ORDER — LIDOCAINE 40 MG/G
CREAM TOPICAL
Status: DISCONTINUED | OUTPATIENT
Start: 2018-05-27 | End: 2018-05-27

## 2018-05-27 RX ORDER — PROCHLORPERAZINE MALEATE 5 MG
5 TABLET ORAL EVERY 6 HOURS PRN
Status: DISCONTINUED | OUTPATIENT
Start: 2018-05-27 | End: 2018-05-31 | Stop reason: HOSPADM

## 2018-05-27 RX ORDER — ACETAMINOPHEN 650 MG/1
650 SUPPOSITORY RECTAL EVERY 4 HOURS PRN
Status: DISCONTINUED | OUTPATIENT
Start: 2018-05-27 | End: 2018-05-31 | Stop reason: HOSPADM

## 2018-05-27 RX ORDER — METOCLOPRAMIDE HYDROCHLORIDE 5 MG/ML
INJECTION INTRAMUSCULAR; INTRAVENOUS
Status: DISCONTINUED
Start: 2018-05-27 | End: 2018-05-27 | Stop reason: HOSPADM

## 2018-05-27 RX ORDER — ONDANSETRON 2 MG/ML
4 INJECTION INTRAMUSCULAR; INTRAVENOUS EVERY 6 HOURS PRN
Status: DISCONTINUED | OUTPATIENT
Start: 2018-05-27 | End: 2018-05-31 | Stop reason: HOSPADM

## 2018-05-27 RX ORDER — METOCLOPRAMIDE HYDROCHLORIDE 5 MG/ML
10 INJECTION INTRAMUSCULAR; INTRAVENOUS ONCE
Status: COMPLETED | OUTPATIENT
Start: 2018-05-27 | End: 2018-05-27

## 2018-05-27 RX ORDER — NICOTINE POLACRILEX 4 MG
15-30 LOZENGE BUCCAL
Status: DISCONTINUED | OUTPATIENT
Start: 2018-05-27 | End: 2018-05-27

## 2018-05-27 RX ORDER — NITROGLYCERIN 0.4 MG/1
0.4 TABLET SUBLINGUAL EVERY 5 MIN PRN
Status: DISCONTINUED | OUTPATIENT
Start: 2018-05-27 | End: 2018-05-31 | Stop reason: HOSPADM

## 2018-05-27 RX ORDER — ACETAMINOPHEN 325 MG/1
650 TABLET ORAL EVERY 4 HOURS PRN
Status: DISCONTINUED | OUTPATIENT
Start: 2018-05-27 | End: 2018-05-30

## 2018-05-27 RX ORDER — ASPIRIN 81 MG/1
162 TABLET, CHEWABLE ORAL ONCE
Status: COMPLETED | OUTPATIENT
Start: 2018-05-27 | End: 2018-05-27

## 2018-05-27 RX ORDER — METOCLOPRAMIDE 5 MG/1
5 TABLET ORAL EVERY 6 HOURS PRN
Status: DISCONTINUED | OUTPATIENT
Start: 2018-05-27 | End: 2018-05-31 | Stop reason: HOSPADM

## 2018-05-27 RX ORDER — SODIUM CHLORIDE 9 MG/ML
INJECTION, SOLUTION INTRAVENOUS CONTINUOUS
Status: DISCONTINUED | OUTPATIENT
Start: 2018-05-27 | End: 2018-05-27

## 2018-05-27 RX ORDER — DEXTROSE MONOHYDRATE 25 G/50ML
25-50 INJECTION, SOLUTION INTRAVENOUS
Status: DISCONTINUED | OUTPATIENT
Start: 2018-05-27 | End: 2018-05-31 | Stop reason: HOSPADM

## 2018-05-27 RX ORDER — FENTANYL CITRATE 50 UG/ML
25 INJECTION, SOLUTION INTRAMUSCULAR; INTRAVENOUS ONCE
Status: COMPLETED | OUTPATIENT
Start: 2018-05-27 | End: 2018-05-27

## 2018-05-27 RX ORDER — LIDOCAINE 40 MG/G
CREAM TOPICAL
Status: DISCONTINUED | OUTPATIENT
Start: 2018-05-27 | End: 2018-05-30

## 2018-05-27 RX ORDER — METOCLOPRAMIDE HYDROCHLORIDE 5 MG/ML
5 INJECTION INTRAMUSCULAR; INTRAVENOUS EVERY 6 HOURS PRN
Status: DISCONTINUED | OUTPATIENT
Start: 2018-05-27 | End: 2018-05-31 | Stop reason: HOSPADM

## 2018-05-27 RX ORDER — SODIUM CHLORIDE 9 MG/ML
INJECTION, SOLUTION INTRAVENOUS CONTINUOUS
Status: ACTIVE | OUTPATIENT
Start: 2018-05-27 | End: 2018-05-28

## 2018-05-27 RX ORDER — DEXTROSE MONOHYDRATE 25 G/50ML
25-50 INJECTION, SOLUTION INTRAVENOUS
Status: DISCONTINUED | OUTPATIENT
Start: 2018-05-27 | End: 2018-05-27

## 2018-05-27 RX ORDER — ASPIRIN 81 MG/1
81 TABLET, CHEWABLE ORAL DAILY
Status: DISCONTINUED | OUTPATIENT
Start: 2018-05-28 | End: 2018-05-28

## 2018-05-27 RX ORDER — ONDANSETRON 4 MG/1
4 TABLET, ORALLY DISINTEGRATING ORAL EVERY 6 HOURS PRN
Status: DISCONTINUED | OUTPATIENT
Start: 2018-05-27 | End: 2018-05-31 | Stop reason: HOSPADM

## 2018-05-27 RX ORDER — FENTANYL CITRATE 50 UG/ML
INJECTION, SOLUTION INTRAMUSCULAR; INTRAVENOUS
Status: DISCONTINUED
Start: 2018-05-27 | End: 2018-05-27 | Stop reason: HOSPADM

## 2018-05-27 RX ORDER — METOPROLOL TARTRATE 25 MG/1
25 TABLET, FILM COATED ORAL ONCE
Status: COMPLETED | OUTPATIENT
Start: 2018-05-27 | End: 2018-05-27

## 2018-05-27 RX ORDER — HALOPERIDOL 5 MG/ML
2 INJECTION INTRAMUSCULAR ONCE
Status: COMPLETED | OUTPATIENT
Start: 2018-05-27 | End: 2018-05-27

## 2018-05-27 RX ORDER — NALOXONE HYDROCHLORIDE 0.4 MG/ML
.1-.4 INJECTION, SOLUTION INTRAMUSCULAR; INTRAVENOUS; SUBCUTANEOUS
Status: DISCONTINUED | OUTPATIENT
Start: 2018-05-27 | End: 2018-05-31 | Stop reason: HOSPADM

## 2018-05-27 RX ADMIN — VANCOMYCIN HYDROCHLORIDE 1500 MG: 5 INJECTION, POWDER, LYOPHILIZED, FOR SOLUTION INTRAVENOUS at 00:42

## 2018-05-27 RX ADMIN — PIPERACILLIN SODIUM,TAZOBACTAM SODIUM 3.38 G: 3; .375 INJECTION, POWDER, FOR SOLUTION INTRAVENOUS at 19:58

## 2018-05-27 RX ADMIN — INSULIN ASPART 3 UNITS: 100 INJECTION, SOLUTION INTRAVENOUS; SUBCUTANEOUS at 04:45

## 2018-05-27 RX ADMIN — HALOPERIDOL LACTATE 2 MG: 5 INJECTION, SOLUTION INTRAMUSCULAR at 02:15

## 2018-05-27 RX ADMIN — HEPARIN SODIUM 800 UNITS/HR: 10000 INJECTION, SOLUTION INTRAVENOUS at 15:49

## 2018-05-27 RX ADMIN — HUMAN ALBUMIN MICROSPHERES AND PERFLUTREN 9 ML: 10; .22 INJECTION, SOLUTION INTRAVENOUS at 13:30

## 2018-05-27 RX ADMIN — FENTANYL CITRATE 25 MCG: 50 INJECTION, SOLUTION INTRAMUSCULAR; INTRAVENOUS at 01:18

## 2018-05-27 RX ADMIN — METOPROLOL TARTRATE 25 MG: 25 TABLET ORAL at 20:10

## 2018-05-27 RX ADMIN — Medication 4000 UNITS: at 15:49

## 2018-05-27 RX ADMIN — PIPERACILLIN SODIUM,TAZOBACTAM SODIUM 3.38 G: 3; .375 INJECTION, POWDER, FOR SOLUTION INTRAVENOUS at 13:12

## 2018-05-27 RX ADMIN — ASPIRIN 81 MG 162 MG: 81 TABLET ORAL at 15:48

## 2018-05-27 RX ADMIN — INSULIN ASPART 2 UNITS: 100 INJECTION, SOLUTION INTRAVENOUS; SUBCUTANEOUS at 09:15

## 2018-05-27 RX ADMIN — METOCLOPRAMIDE 10 MG: 5 INJECTION, SOLUTION INTRAMUSCULAR; INTRAVENOUS at 01:03

## 2018-05-27 RX ADMIN — PIPERACILLIN SODIUM,TAZOBACTAM SODIUM 3.38 G: 3; .375 INJECTION, POWDER, FOR SOLUTION INTRAVENOUS at 07:13

## 2018-05-27 RX ADMIN — SODIUM CHLORIDE 250 ML: 9 INJECTION, SOLUTION INTRAVENOUS at 16:33

## 2018-05-27 RX ADMIN — INSULIN ASPART 1 UNITS: 100 INJECTION, SOLUTION INTRAVENOUS; SUBCUTANEOUS at 13:04

## 2018-05-27 RX ADMIN — Medication 2 G: at 22:41

## 2018-05-27 RX ADMIN — SODIUM CHLORIDE, PRESERVATIVE FREE: 5 INJECTION INTRAVENOUS at 03:50

## 2018-05-27 RX ADMIN — Medication 2 G: at 22:00

## 2018-05-27 RX ADMIN — FENTANYL CITRATE 25 MCG: 50 INJECTION, SOLUTION INTRAMUSCULAR; INTRAVENOUS at 02:14

## 2018-05-27 ASSESSMENT — ENCOUNTER SYMPTOMS
BACK PAIN: 0
LIGHT-HEADEDNESS: 1
SHORTNESS OF BREATH: 0
CHILLS: 0
NAUSEA: 1
DIARRHEA: 0
VOMITING: 1
WHEEZING: 0
HEADACHES: 0
CHEST TIGHTNESS: 0
WEAKNESS: 1
FEVER: 0
ABDOMINAL PAIN: 0
DYSURIA: 0
PALPITATIONS: 0

## 2018-05-27 ASSESSMENT — ACTIVITIES OF DAILY LIVING (ADL)
TRANSFERRING: 0-->INDEPENDENT
BATHING: 0-->INDEPENDENT
RETIRED_COMMUNICATION: 0-->UNDERSTANDS/COMMUNICATES WITHOUT DIFFICULTY
WHICH_OF_THE_ABOVE_FUNCTIONAL_RISKS_HAD_A_RECENT_ONSET_OR_CHANGE?: FALL HISTORY
DRESS: 0-->INDEPENDENT
NUMBER_OF_TIMES_PATIENT_HAS_FALLEN_WITHIN_LAST_SIX_MONTHS: 2
RETIRED_EATING: 0-->INDEPENDENT
TOILETING: 0-->INDEPENDENT
COGNITION: 1 - ATTENTION OR MEMORY DEFICITS
FALL_HISTORY_WITHIN_LAST_SIX_MONTHS: YES
SWALLOWING: 0-->SWALLOWS FOODS/LIQUIDS WITHOUT DIFFICULTY
AMBULATION: 1-->ASSISTIVE EQUIPMENT

## 2018-05-27 ASSESSMENT — VISUAL ACUITY
OU: NORMAL ACUITY

## 2018-05-27 NOTE — PROGRESS NOTES
RECEIVING UNIT ED HANDOFF REVIEW    ED Nurse Handoff Report was reviewed by: Marie Song on May 27, 2018 at 1:42 AM

## 2018-05-27 NOTE — PROGRESS NOTES
[facility]    Hospitalist Progress Note    Assessment & Plan   Dustin Pearce is a 89 year old male who was admitted on 5/26/2018.     Dustin Pearce is a markedly pleasant 89 year old gentleman with extensive past medical history most notable for CAD, chronic systolic CHF, Diabetes mellitus, moderate aortic stenosis, PAD status post lower extremity angioplasties, mild left carotid artery stenosis, chronic paroxysmal afib, GERD, hypertension and dyslipidemia who presents with dizziness, nausea and vomiting and chest pain and is found to have hypotension, lactic acidosis, and hypoxia, concerning for severe sepsis of unclear etiology vs possible ACS.     1) Severe sepsis suspected: In the ED Mr. Pearce is very ill appearing, actively vomiting. Initial BP 89/50, improved to 140/71 with IV fluid bolus. Hypoxic to upper 80 percent range on room air. He is afebrile and pulse 72. CBC showed WBC 9.5, HGB 12, . Lactate is 4.8, improved to 3.5 with IV fluid. CMP unremarkable. D Dimer mildly elevated. CT PA negative for acute pathology as is CXR. EKG shows NSR with ST segment depressions in V3-4     Although the first  two Troponins are negative, as is Procalcitonin, the thrid troponin was abn at 0.119    UA is relatively bland (3 WBC). Blood cultures were sent and he was given anti-emetics as well as Vancomycin and Zosyn.    Overall he could have severe sepsis of unclear cause such as bacteremia or intra-abdominal process. He could have viral gastroenteritis. He was hospitalized here 2/2018 for very similar presentation and found to have E faecalis UTI and could have recurrent UTI. He could have evolving ACS. His wife initially was concerned for stroke but he has no focal neurologic deficits at  time of admission making stroke less likely at present as well. His right leg drag could have been a TIA exacerbated by hypertension (note being made of hospitalization for TIA in 12/2017). Last, review of  notes in Epic suggests that he has chronic orthostatic hypotension.     -- IMC initially. As his BP have been normal now since on floor, and troponin is abnormal, will transfer to cardiac floor.    -- Was on iv fluid at 75, but given the history of CHF will let patient eat, saline lock and follow lactic acid    -- Serial Troponins, TTE ordered.   -cardiology has been consulted    -- Zosyn continued for now; follow up blood and urine cultures    -- CT abdomen and pelvis - No cause of acute pain identified in the abdomen or pelvis.. Colonic diverticulosis without evidence of diverticulitis    -- Q 4 neuro checks; for any concerning changes would call a code stroke (again, noting a non-focal exam in the ED)    --will start on some clear liquids but if vomiting occurs will make npo again.     2) CAD, chronic systolic CHF: He has a history of remote MI. He is followed by [facility]. Most recent TTE 1/2018 showed EF 35-40% with multiple WMA's, and moderate AS and mild aortic root dilation. Recent Nuclear Stress test in 2017 reportedly showed persistent reversible inferior wall ischemia.     -- Resume aspirin, Plavix, Toprol, Lisinopril, and statin when verified and in the case of anti hypertensives, must also verify resolution of hypotension     3) Prior TIA: Plavix was added to aspirin in 12/2017.      4) Type 2 Diabetes mellitus: Hold Metformin and Glipizide, use ISS insulin while hospitalized. Check A1c.     5) Anemia: Mild, monitor     6) Chronic depression, GERD, BPH: Resume Cymbalta, PPI, when verified; hold Flomax for hypotension    # Pain Assessment:  Current Pain Score 5/27/2018   Patient currently in pain? yes   Pain score (0-10) 5   Pain location Chest   Pain descriptors Pressure   had chest pressure earlier, but at present denies pain    DVT Prophylaxis: SCD  Code Status: Full Code    Disposition: Expected discharge 4-5 days    Text Page (7am - 6pm)  Meseret Tidwell MD    Interval History   Admitted  after midnight, but now denies pain and pt says he feels better.    -Data reviewed today: I reviewed all new labs and imaging results over the last 24 hours. I personally reviewed the results of the CT of abdomen pelvis which did not show any abn except for divertiulosis without diverticulitis.    Physical Exam   Temp: 98.7  F (37.1  C) Temp src: Oral BP: 113/52 Pulse: 72 Heart Rate: 76 Resp: 9 SpO2: 100 % O2 Device: Nasal cannula Oxygen Delivery: 2 LPM  Vitals:    05/27/18 0319   Weight: 75.3 kg (166 lb)     Vital Signs with Ranges  Temp:  [98  F (36.7  C)-98.7  F (37.1  C)] 98.7  F (37.1  C)  Pulse:  [72] 72  Heart Rate:  [] 76  Resp:  [8-24] 9  BP: ()/(47-96) 113/52  SpO2:  [96 %-100 %] 100 %  I/O last 3 completed shifts:  In: -   Out: 250 [Urine:250]    Constitutional: alert   Respiratory: clear to auscultation, no wheezing or rhonchi   Cardiovascular: regular rate and rhythm without murmer or rub   GI:positive bowel sounds, non-tender   Skin/Integumen: no rashes    Other:        Medications     sodium chloride 75 mL/hr at 05/27/18 0350       acetaminophen  1,000 mg Oral Once     insulin aspart  1-6 Units Subcutaneous Q4H     piperacillin-tazobactam  3.375 g Intravenous Q6H     sodium chloride (PF)  3 mL Intracatheter Q8H     sodium chloride (PF)  3 mL Intracatheter Q8H       Data     Recent Labs  Lab 05/27/18  0350 05/27/18  0119 05/26/18 2035   WBC  --   --  9.5   HGB  --   --  12.0*   MCV  --   --  92   PLT  --   --  189   NA  --   --  141   POTASSIUM  --   --  4.4   CHLORIDE  --   --  106   CO2  --   --  23   BUN  --   --  24   CR  --   --  1.06   ANIONGAP  --   --  12   ALLISON  --   --  8.8   GLC  --   --  183*   ALBUMIN  --   --  3.5   PROTTOTAL  --   --  7.0   BILITOTAL  --   --  0.3   ALKPHOS  --   --  69   ALT  --   --  18   AST  --   --  20   TROPI 0.119*  --  0.020   TROPONIN  --  0.00  --        Recent Results (from the past 24 hour(s))   CT Head w/o Contrast    Narrative    CT OF THE HEAD  WITHOUT CONTRAST 5/26/2018 9:24 PM     COMPARISON: Brain MR 1/1/2018.    HISTORY: Dizziness tonight, on thinners, syncope.      TECHNIQUE: 5 mm thick axial CT images of the head were acquired  without IV contrast material.    FINDINGS: There is moderate diffuse cerebral volume loss. There are  subtle patchy areas of decreased density in the cerebral white matter  bilaterally that are consistent with sequela of chronic small vessel  ischemic disease. A moderate sized left frontal ischemic infarct is  again noted without change.    The ventricles and basal cisterns are within normal limits in  configuration given the degree of cerebral volume loss.  There is no  midline shift. There are no extra-axial fluid collections.    No intracranial hemorrhage, mass or recent infarct.    The visualized paranasal sinuses are well-aerated. There is no  mastoiditis. There are no fractures of the visualized bones.      Impression    IMPRESSION: Diffuse cerebral volume loss and cerebral white matter  changes consistent with chronic small vessel ischemic disease. No  evidence for acute intracranial pathology.    Radiation dose for this scan was reduced using automated exposure  control, adjustment of the mA and/or kV according to patient size, or  iterative reconstruction technique.    LISANDRO MAGALLON MD   Chest XR,  PA & LAT    Narrative    CHEST TWO VIEWS    5/26/2018 10:52 PM     COMPARISON: Frontal chest x-ray 8/19/2009.    HISTORY: Hypoxia.    FINDINGS: The cardiac silhouette, pulmonary vasculature, lungs and  pleural spaces are within normal limits.      Impression    IMPRESSION: Clear lungs.    LISANDRO MAGALLON MD   Chest CT, IV contrast only - PE protocol    Narrative    CT CHEST WITH CONTRAST   5/26/2018 11:50 PM     HISTORY: Hypoxia. Dizziness.    COMPARISON: 2/8/2018 - CT abdomen and pelvis.    TECHNIQUE: Following the uneventful administration of 64 mL Isovue-370  intravenous contrast, helical sections were acquired through  the lungs  according to the pulmonary embolism protocol. Coronal reconstructions  were generated. Radiation dose for this scan was reduced using  automated exposure control, adjustment of the mA and/or kV according  to the patient's size, or iterative reconstruction technique.    FINDINGS: No visualized pulmonary embolus. The thoracic aorta is  normal in caliber without dissection. Atherosclerotic calcification in  the thoracic aorta and coronary arteries.    Minimal emphysematous changes in the lungs. A band-like opacity in the  lateral aspect of the mid left lung likely represents atelectasis or  scarring. The lungs are otherwise clear. No pleural or pericardial  effusion. No enlarged lymph nodes in the chest. Small hiatal hernia.    Scan through the upper abdomen is significant for a few nonobstructing  calculi in both kidneys, largest a 0.6 cm right renal calculus, and a  4.6 cm cyst containing a thin internal septation in the upper pole of  the left kidney.      Impression    IMPRESSION:   1. No visualized pulmonary embolus.  2. No convincing evidence of active pulmonary disease.    MEME PFEIFFER MD   CT Abdomen Pelvis w/o Contrast    Narrative    CT ABDOMEN AND PELVIS WITHOUT CONTRAST   5/27/2018 2:14 AM     HISTORY: Nausea and vomiting.    COMPARISON: 2/8/2018.    TECHNIQUE: Without intravenous contrast, helical sections were  acquired from the top of the diaphragm through the pubic symphysis.  Coronal reconstructions were generated. Radiation dose for this scan  was reduced using automated exposure control, adjustment of the mA  and/or kV according to the patient's size, or iterative reconstruction  technique.    FINDINGS:     Abdomen: The liver, spleen, pancreas and adrenal glands are  unremarkable. 4.4 cm cyst containing a thin internal septation in the  upper pole of the left kidney. Excreted contrast material from a  recent CT scan present within the renal collecting systems and  ureters. The  gallbladder is not visualized. Small hiatal hernia. No  enlarged lymph nodes or free fluid in the upper abdomen.  Atherosclerotic calcification in the abdominal aorta.    Scan through the lower chest is unremarkable.    Pelvis: The small and large bowel are normal in caliber. Several  colonic diverticula are present without evidence of diverticulitis.  The appendix is unremarkable. No bowel wall thickening, pneumatosis or  free intraperitoneal gas. 1 cm diverticulum from the posterior right  aspect of the urinary bladder. No enlarged lymph nodes or free fluid  in the pelvis. Bilateral L5 pars interarticularis defects.      Impression    IMPRESSION:   1. No cause of acute pain identified in the abdomen or pelvis.  2. Colonic diverticulosis without evidence of diverticulitis.    MEME PFEIFFER MD           Medications     sodium chloride 75 mL/hr at 05/27/18 0350       acetaminophen  1,000 mg Oral Once     insulin aspart  1-6 Units Subcutaneous Q4H     piperacillin-tazobactam  3.375 g Intravenous Q6H     sodium chloride (PF)  3 mL Intracatheter Q8H     sodium chloride (PF)  3 mL Intracatheter Q8H       Data     Recent Labs  Lab 05/27/18  0350 05/27/18  0119 05/26/18  2035   WBC  --   --  9.5   HGB  --   --  12.0*   MCV  --   --  92   PLT  --   --  189   NA  --   --  141   POTASSIUM  --   --  4.4   CHLORIDE  --   --  106   CO2  --   --  23   BUN  --   --  24   CR  --   --  1.06   ANIONGAP  --   --  12   ALLISON  --   --  8.8   GLC  --   --  183*   ALBUMIN  --   --  3.5   PROTTOTAL  --   --  7.0   BILITOTAL  --   --  0.3   ALKPHOS  --   --  69   ALT  --   --  18   AST  --   --  20   TROPI 0.119*  --  0.020   TROPONIN  --  0.00  --        Recent Results (from the past 24 hour(s))   CT Head w/o Contrast    Narrative    CT OF THE HEAD WITHOUT CONTRAST 5/26/2018 9:24 PM     COMPARISON: Brain MR 1/1/2018.    HISTORY: Dizziness tonight, on thinners, syncope.      TECHNIQUE: 5 mm thick axial CT images of the head were  acquired  without IV contrast material.    FINDINGS: There is moderate diffuse cerebral volume loss. There are  subtle patchy areas of decreased density in the cerebral white matter  bilaterally that are consistent with sequela of chronic small vessel  ischemic disease. A moderate sized left frontal ischemic infarct is  again noted without change.    The ventricles and basal cisterns are within normal limits in  configuration given the degree of cerebral volume loss.  There is no  midline shift. There are no extra-axial fluid collections.    No intracranial hemorrhage, mass or recent infarct.    The visualized paranasal sinuses are well-aerated. There is no  mastoiditis. There are no fractures of the visualized bones.      Impression    IMPRESSION: Diffuse cerebral volume loss and cerebral white matter  changes consistent with chronic small vessel ischemic disease. No  evidence for acute intracranial pathology.    Radiation dose for this scan was reduced using automated exposure  control, adjustment of the mA and/or kV according to patient size, or  iterative reconstruction technique.    LISANDRO MAGALLON MD   Chest XR,  PA & LAT    Narrative    CHEST TWO VIEWS    5/26/2018 10:52 PM     COMPARISON: Frontal chest x-ray 8/19/2009.    HISTORY: Hypoxia.    FINDINGS: The cardiac silhouette, pulmonary vasculature, lungs and  pleural spaces are within normal limits.      Impression    IMPRESSION: Clear lungs.    LISANDRO MAGALLON MD   Chest CT, IV contrast only - PE protocol    Narrative    CT CHEST WITH CONTRAST   5/26/2018 11:50 PM     HISTORY: Hypoxia. Dizziness.    COMPARISON: 2/8/2018 - CT abdomen and pelvis.    TECHNIQUE: Following the uneventful administration of 64 mL Isovue-370  intravenous contrast, helical sections were acquired through the lungs  according to the pulmonary embolism protocol. Coronal reconstructions  were generated. Radiation dose for this scan was reduced using  automated exposure control, adjustment  of the mA and/or kV according  to the patient's size, or iterative reconstruction technique.    FINDINGS: No visualized pulmonary embolus. The thoracic aorta is  normal in caliber without dissection. Atherosclerotic calcification in  the thoracic aorta and coronary arteries.    Minimal emphysematous changes in the lungs. A band-like opacity in the  lateral aspect of the mid left lung likely represents atelectasis or  scarring. The lungs are otherwise clear. No pleural or pericardial  effusion. No enlarged lymph nodes in the chest. Small hiatal hernia.    Scan through the upper abdomen is significant for a few nonobstructing  calculi in both kidneys, largest a 0.6 cm right renal calculus, and a  4.6 cm cyst containing a thin internal septation in the upper pole of  the left kidney.      Impression    IMPRESSION:   1. No visualized pulmonary embolus.  2. No convincing evidence of active pulmonary disease.    MEME PFEIFFER MD   CT Abdomen Pelvis w/o Contrast    Narrative    CT ABDOMEN AND PELVIS WITHOUT CONTRAST   5/27/2018 2:14 AM     HISTORY: Nausea and vomiting.    COMPARISON: 2/8/2018.    TECHNIQUE: Without intravenous contrast, helical sections were  acquired from the top of the diaphragm through the pubic symphysis.  Coronal reconstructions were generated. Radiation dose for this scan  was reduced using automated exposure control, adjustment of the mA  and/or kV according to the patient's size, or iterative reconstruction  technique.    FINDINGS:     Abdomen: The liver, spleen, pancreas and adrenal glands are  unremarkable. 4.4 cm cyst containing a thin internal septation in the  upper pole of the left kidney. Excreted contrast material from a  recent CT scan present within the renal collecting systems and  ureters. The gallbladder is not visualized. Small hiatal hernia. No  enlarged lymph nodes or free fluid in the upper abdomen.  Atherosclerotic calcification in the abdominal aorta.    Scan through the lower  chest is unremarkable.    Pelvis: The small and large bowel are normal in caliber. Several  colonic diverticula are present without evidence of diverticulitis.  The appendix is unremarkable. No bowel wall thickening, pneumatosis or  free intraperitoneal gas. 1 cm diverticulum from the posterior right  aspect of the urinary bladder. No enlarged lymph nodes or free fluid  in the pelvis. Bilateral L5 pars interarticularis defects.      Impression    IMPRESSION:   1. No cause of acute pain identified in the abdomen or pelvis.  2. Colonic diverticulosis without evidence of diverticulitis.    MEME PFEIFFER MD

## 2018-05-27 NOTE — PLAN OF CARE
Problem: Patient Care Overview  Goal: Plan of Care/Patient Progress Review  Outcome: Improving  VSS, A&O, Lung sounds diminished. Bowel sounds active, passing gas. Adequate urine output. Denies pain. Ambulates with assist of 1 and walker. Tolerating clear liquids. Denies nausea. Report given to [facility] nurse and patient transferred to [facility]- wife at bedside. Neuro's intact

## 2018-05-27 NOTE — PROGRESS NOTES
Next Troponin 0.119. This could be due to demand ischemia or ACS given EKG changes documented in my H and P. I will place Cardiology consult this morning for further evaluation. If the next Troponin rises significantly further I will order a Heparin infusion.

## 2018-05-27 NOTE — PHARMACY-ADMISSION MEDICATION HISTORY
Admission medication history interview status for the 5/26/2018  admission is complete. See EPIC admission navigator for prior to admission medications     Medication history source reliability:Good    Actions taken by pharmacist (provider contacted, etc): [facility] notes, interviewed wife who knew his med list     Additional medication history information not noted on PTA med list :None    Medication reconciliation/reorder completed by provider prior to medication history? yes    Time spent in this activity: 20 min    Prior to Admission medications    Medication Sig Last Dose Taking? Auth Provider   AMITRIPTYLINE HCL PO Take 25 mg by mouth nightly as needed for sleep  Yes Unknown, Entered By History   aspirin EC 81 MG EC tablet Take 1 tablet (81 mg) by mouth daily  Yes Tra Reynoso MD   ATORVASTATIN CALCIUM PO Take 40 mg by mouth At Bedtime   Yes Reported, Patient   bisacodyl (DULCOLAX) 10 MG Suppository Place 10 mg rectally daily as needed for constipation  Yes Reported, Patient   Cholecalciferol 25198 UNITS TABS Take 1 tablet by mouth three times a week   Yes Reported, Patient   Clopidogrel Bisulfate, [id number], (PLAVIX PO) Take 75 mg by mouth daily   Yes Reported, Patient   DULoxetine (CYMBALTA) 30 MG EC capsule Take 1 capsule (30 mg) by mouth daily  Yes Matt Morrissey MD   glipiZIDE (GLUCOTROL) 10 MG tablet Take 1 tablet (10 mg) by mouth 2 times daily (before meals)  Yes Matt Morrissey MD   ipratropium (ATROVENT) 0.03 % spray Spray 1 spray into both nostrils every 12 hours as needed   Yes Unknown, Entered By History   LISINOPRIL PO Take 2.5 mg by mouth daily   Yes Reported, Patient   magnesium oxide (MAG-OX) 400 (241.3 Mg) MG tablet TAKE 1 TABLET BY MOUTH TWICE DAILY  Yes Matt Morrissey MD   METFORMIN HCL PO Take 1,000 mg by mouth 2 times daily (with meals)  Yes Reported, Patient   metoprolol (TOPROL-XL) 25 MG 24 hr tablet Take 0.5 tablets (12.5 mg) by mouth daily  Yes Chrissy Padgett,  PA-C   OMEPRAZOLE PO Take 40 mg by mouth 2 times daily   Yes Unknown, Entered By History   polyethylene glycol (MIRALAX/GLYCOLAX) Packet Take 17 g by mouth daily as needed for constipation  Yes Reported, Patient   tamsulosin (FLOMAX) 0.4 MG capsule Take 1 capsule (0.4 mg) by mouth daily  Yes Matt Morrissey MD   order for DME Equipment being ordered: True Matrix Blood Glucose meter.   Matt Morrissey MD   TRUE METRIX BLOOD GLUCOSE TEST test strip USE TO TEST BLOOD SUGAR THREE TIMES DAILY OR AS DIRECTED   Matt Morrissey MD

## 2018-05-27 NOTE — ED NOTES
"[facility]  ED Nurse Handoff Report    ED Chief complaint: Dizziness (At [employer] eating.  Had trouble getting up from table.  Dizzy, trouble with walking, right foot was dragging.  Uses cane to walk.  Hx of stroke.  )      ED Diagnosis:   Final diagnoses:   None       Code Status: Full Code    Allergies:   Allergies   Allergen Reactions     Oxycodone Other (See Comments)     \"TERRIBLE SWEATING\"     Sulfa Drugs      Tetracycline        Activity level - Baseline/Home:  Independent    Activity Level - Current:   Stand with Assist     Needed?: No    Isolation: No  Infection: Not Applicable    Bariatric?: No    Vital Signs:   Vitals:    05/26/18 2130 05/26/18 2300 05/26/18 2310 05/27/18 0030   BP:  (!) 89/50 91/49 140/71   Pulse:       Resp: 14  14 24   Temp:       TempSrc:       SpO2: 98% 100% 100% 99%   Height:           Cardiac Rhythm: ,   Cardiac  Cardiac Rhythm: Atrial fibrillation    Pain level:      Is this patient confused?: No   Suicidality: Screened negative    Patient Report: Initial Complaint: dizzy  Focused Assessment: Pt here with dizziness.  Having dinner with spouse tonight and almost fell down.  Reported having \"dragging in leg\" and were worried about stroke upon arrival.  Pt has hx stroke.  Pt zero on stroke scale and A&O.  Pt having nausea and some emesis, just now after last emesis at 1230 pt c/o chest tightness.  Has been slightly hypoxic and on 2L NC.  Has had no output since coming to ED despite fluid replacements.  Lactic level improved.  Tests unclear as to source for pt's symptoms.  Pt hypotensive intially.  Tests Performed: labs, ct, xray  Abnormal Results:   Results for orders placed or performed during the hospital encounter of 05/26/18 (from the past 24 hour(s))   CBC with platelets differential   Result Value Ref Range    WBC 9.5 4.0 - 11.0 10e9/L    RBC Count 4.14 (L) 4.4 - 5.9 10e12/L    Hemoglobin 12.0 (L) 13.3 - 17.7 g/dL    Hematocrit 38.2 (L) 40.0 - 53.0 % "    MCV 92 78 - 100 fl    MCH 29.0 26.5 - 33.0 pg    MCHC 31.4 (L) 31.5 - 36.5 g/dL    RDW 14.2 10.0 - 15.0 %    Platelet Count 189 150 - 450 10e9/L    Diff Method Automated Method     % Neutrophils 45.4 %    % Lymphocytes 44.4 %    % Monocytes 7.9 %    % Eosinophils 1.7 %    % Basophils 0.3 %    % Immature Granulocytes 0.3 %    Nucleated RBCs 0 0 /100    Absolute Neutrophil 4.3 1.6 - 8.3 10e9/L    Absolute Lymphocytes 4.2 0.8 - 5.3 10e9/L    Absolute Monocytes 0.8 0.0 - 1.3 10e9/L    Absolute Eosinophils 0.2 0.0 - 0.7 10e9/L    Absolute Basophils 0.0 0.0 - 0.2 10e9/L    Abs Immature Granulocytes 0.0 0 - 0.4 10e9/L    Absolute Nucleated RBC 0.0    Comprehensive metabolic panel   Result Value Ref Range    Sodium 141 133 - 144 mmol/L    Potassium 4.4 3.4 - 5.3 mmol/L    Chloride 106 94 - 109 mmol/L    Carbon Dioxide 23 20 - 32 mmol/L    Anion Gap 12 3 - 14 mmol/L    Glucose 183 (H) 70 - 99 mg/dL    Urea Nitrogen 24 7 - 30 mg/dL    Creatinine 1.06 0.66 - 1.25 mg/dL    GFR Estimate 66 >60 mL/min/1.7m2    GFR Estimate If Black 79 >60 mL/min/1.7m2    Calcium 8.8 8.5 - 10.1 mg/dL    Bilirubin Total 0.3 0.2 - 1.3 mg/dL    Albumin 3.5 3.4 - 5.0 g/dL    Protein Total 7.0 6.8 - 8.8 g/dL    Alkaline Phosphatase 69 40 - 150 U/L    ALT 18 0 - 70 U/L    AST 20 0 - 45 U/L   Troponin I   Result Value Ref Range    Troponin I ES 0.020 0.000 - 0.045 ug/L   D dimer quantitative   Result Value Ref Range    D Dimer 0.7 (H) 0.0 - 0.50 ug/ml FEU   EKG 12 lead   Result Value Ref Range    Interpretation ECG Click View Image link to view waveform and result    Lactic acid whole blood   Result Value Ref Range    Lactic Acid 4.8 (HH) 0.7 - 2.0 mmol/L   CT Head w/o Contrast    Narrative    CT OF THE HEAD WITHOUT CONTRAST 5/26/2018 9:24 PM     COMPARISON: Brain MR 1/1/2018.    HISTORY: Dizziness tonight, on thinners, syncope.      TECHNIQUE: 5 mm thick axial CT images of the head were acquired  without IV contrast material.    FINDINGS: There is  moderate diffuse cerebral volume loss. There are  subtle patchy areas of decreased density in the cerebral white matter  bilaterally that are consistent with sequela of chronic small vessel  ischemic disease. A moderate sized left frontal ischemic infarct is  again noted without change.    The ventricles and basal cisterns are within normal limits in  configuration given the degree of cerebral volume loss.  There is no  midline shift. There are no extra-axial fluid collections.    No intracranial hemorrhage, mass or recent infarct.    The visualized paranasal sinuses are well-aerated. There is no  mastoiditis. There are no fractures of the visualized bones.      Impression    IMPRESSION: Diffuse cerebral volume loss and cerebral white matter  changes consistent with chronic small vessel ischemic disease. No  evidence for acute intracranial pathology.    Radiation dose for this scan was reduced using automated exposure  control, adjustment of the mA and/or kV according to patient size, or  iterative reconstruction technique.    LISANDRO MAGALLON MD   UA reflex to Microscopic and Culture   Result Value Ref Range    Color Urine Yellow     Appearance Urine Clear     Glucose Urine 70 (A) NEG^Negative mg/dL    Bilirubin Urine Negative NEG^Negative    Ketones Urine 5 (A) NEG^Negative mg/dL    Specific Gravity Urine 1.016 1.003 - 1.035    Blood Urine Negative NEG^Negative    pH Urine 5.5 5.0 - 7.0 pH    Protein Albumin Urine 30 (A) NEG^Negative mg/dL    Urobilinogen mg/dL Normal 0.0 - 2.0 mg/dL    Nitrite Urine Negative NEG^Negative    Leukocyte Esterase Urine Small (A) NEG^Negative    Source Catheterized Urine     RBC Urine 5 (H) 0 - 2 /HPF    WBC Urine 3 0 - 5 /HPF    Squamous Epithelial /HPF Urine 1 0 - 1 /HPF    Mucous Urine Present (A) NEG^Negative /LPF    Hyaline Casts 3 (H) 0 - 2 /LPF   Chest XR,  PA & LAT    Narrative    CHEST TWO VIEWS    5/26/2018 10:52 PM     COMPARISON: Frontal chest x-ray 8/19/2009.    HISTORY:  Hypoxia.    FINDINGS: The cardiac silhouette, pulmonary vasculature, lungs and  pleural spaces are within normal limits.      Impression    IMPRESSION: Clear lungs.    LISANDRO MAGALLON MD   Lactic acid   Result Value Ref Range    Lactic Acid 3.5 (H) 0.7 - 2.0 mmol/L   Chest CT, IV contrast only - PE protocol    Narrative    CT CHEST WITH CONTRAST   5/26/2018 11:50 PM     HISTORY: Hypoxia. Dizziness.    COMPARISON: 2/8/2018-CT abdomen and pelvis.    TECHNIQUE: Following the uneventful administration of 64 mL Isovue-370  intravenous contrast, helical sections were acquired through the lungs  according to the pulmonary embolism protocol. Coronal reconstructions  were generated. Radiation dose for this scan was reduced using  automated exposure control, adjustment of the mA and/or kV according  to the patient's size, or iterative reconstruction technique.    FINDINGS: No visualized pulmonary embolus. The thoracic aorta is  normal in caliber without dissection. Atherosclerotic calcification in  the thoracic aorta and coronary arteries.    Minimal emphysematous changes in the lungs. A band-like opacity in the  lateral aspect of the mid left lung likely represents atelectasis or  scarring. The lungs are otherwise clear. No pleural or pericardial  effusion. No enlarged lymph nodes in the chest. Small hiatal hernia.    Scan through the upper abdomen is significant for a few nonobstructing  calculi in both kidneys, largest a 0.6 cm right renal calculus, and a  4.6 cm cyst containing a thin internal septation in the upper pole of  the left kidney.      Impression    IMPRESSION:   1. No visualized pulmonary embolus.  2. No convincing evidence of active pulmonary disease.       Treatments provided: NS bolus x 3, zosyn, zofran, reglan    Family Comments: spouse    OBS brochure/video discussed/provided to patient: N/A    ED Medications:   Medications   ondansetron (ZOFRAN) injection 4 mg (4 mg Intravenous Given 5/26/18 1369)    acetaminophen (TYLENOL) tablet 1,000 mg (not administered)   vancomycin (VANCOCIN) 1,500 mg in sodium chloride 0.9 % 250 mL intermittent infusion (1,500 mg Intravenous New Bag 5/27/18 0042)   0.9% sodium chloride BOLUS (0 mLs Intravenous Stopped 5/26/18 2150)   meclizine (ANTIVERT) tablet 50 mg (50 mg Oral Given 5/26/18 2112)   piperacillin-tazobactam (ZOSYN) 4.5 g vial to attach to  mL bag (0 g Intravenous Stopped 5/26/18 2300)   0.9% sodium chloride BOLUS (0 mLs Intravenous Stopped 5/26/18 2356)   iopamidol (ISOVUE-370) solution 64 mL (64 mLs Intravenous Given 5/26/18 2345)   CT scan flush (90 mLs Intravenous Given 5/26/18 2345)   0.9% sodium chloride BOLUS (1,000 mLs Intravenous New Bag 5/26/18 2354)       Drips infusing?:  No    For the majority of the shift this patient was Green.   Interventions performed were xx.    Severe Sepsis OR Septic Shock Diagnosis Present:   Yes    Per the ED Provider, Time Zero for severe sepsis or septic shock is:  2113  3 Hour Severe Sepsis Bundle Completion:  1. Initial Lactic Acid Result:   Recent Labs   Lab Test  05/26/18 2305 05/26/18 2102   LACT  3.5*  4.8*     2. Blood Cultures before Antibiotics: Yes  3. Broad Spectrum Antibiotics Administered:     Anti-infectives (Future)    Start     Dose/Rate Route Frequency Ordered Stop    05/27/18 0027  vancomycin (VANCOCIN) 1,500 mg in sodium chloride 0.9 % 250 mL intermittent infusion      1,500 mg  over 90 Minutes Intravenous ONCE 05/27/18 0026          4. 2500 ml of IV fluids have been given so far      6 Hour Severe Sepsis Bundle Completion:    1. Repeat Lactic Acid Level: 3.5  2. Patient currently on Vasopressors =  No      ED NURSE PHONE NUMBER: [id number]

## 2018-05-27 NOTE — PROGRESS NOTES
Hospitalist addendum    Pt transferred to [facility].  He denies chest pain although he did have some last night.    Troponin 2.083<0.898<0.119    Pt's BP have been ok, lactic acid down to 2.1    Will start aspirin and also heparin gtt.    OMAYRA Tidwell MD    Addendum@ 1606  Echo with EF of 35-40%.  Mod - severer inferlateral wall hypokinesis, mod inf wall hypokinesis    Echo from 1/1/18 had EF of 35-40%, severe hypokinesis to akinesis of entire inf and mid to basal inferolateral wall.  BP just now 99/47, will give bolus of 250 cc and restart iv at 75 cc/hr for the next 8 hours.  Monitor for volume overload.    OMAYRA Tidwell MD

## 2018-05-27 NOTE — ED PROVIDER NOTES
History     Chief Complaint:  Dizziness     HPI   Dustin Pearce is a 89 year old male who presents to the emergency department today for evaluation of dizziness. The patient reports he was eating with his wife and then got up and felt very dizzy.  He felt weak as well and had to grab onto his wife is a left the restaurant.  Finally get him to sit down and then he had a brief episode of syncope.  He denied any chest pain during this time or shortness of breath.  He did have an episode of emesis and I am once was called he was brought in.  There is a question of him dragging his right leg for a while when he was walking holding onto his wife but no other weakness was noted.  He did not have slurred speech or word finding difficulty.  He has not been sick recently and earlier in the day was totally normal.  No fevers or chills, no urinary symptoms, no cough.  He denies abdominal pain.  No chest pain although he has a long history of cardiovascular disease as noted below.  He said the room actually was spinning and was much worse if he moved around.  His wife is concerned about the possibility of a stroke as he has had one in the past.  He has a very slight frontal headache, but denies other focal neurologic complaints such as weakness numbness or blurry or double vision.    Allergies:  Oxycodone  Sulfa Drugs  Tetracycline     Medications:    Exenatide  Magnesium  Acetaminophen  Miralax/Glycolax  Cholecalciferol  Lisinopril  Metoprolol  Glipizide  Amitriptyline  Atrovent  Cymbalta  Flomax  Aspirin 81   Metformin  Atorvastatin  Clopidogrel  Zofran  Omeprazole     Past Medical History:    Aortic root dilatation                Aortic stenosis             Benign non-nodular prostatic hyperplasia with lower urinary tract symptoms                      CAD (coronary artery disease)                       Cardiomyopathy   Carotid artery stenosis                        Carotid occlusion, left             Diabetes mellitus                     DJD (degenerative joint disease)                   Gastro-oesophageal reflux disease                Gastroesophageal reflux disease without esophagitis                       MI  Hyperlipidemia                        Hypertension               Mild cognitive impairment                   Nephrolithiasis                        Osteopenia                  PAD (peripheral artery disease)                     Paroxysmal atrial fibrillation                RBBB with left anterior fascicular block                     Unspecified cerebral artery occlusion with cerebral infarction                         Past Surgical History:    Amputate left foot  Cholecystectomy  Endarterectomy right carotid  Esophagoscopy, gastroscopy, duodenoscopy (egd), combined   Kidney stone removal x 4  UGI Endoscopy with EUS  Hernia repair  Incision and drainage left foot combined  Laser holmium lithotripsy ureters, insert stent, combined  Rotator cuff repair      Family History:    Mother: Breast Cancer  Father: Heart Failure     Social History:  The patient was accompanied to the ED by significant other.  Smoking status: Former Smoker                        Packs/day: 1.00                        Years:            30.00                        Types:           Cigarettes                        Quit date: 1/1/1999  Smokeless tobacco: Never Used  Alcohol use 4.2 oz/week - 7 Standard drinks or equivalent per week                        Comment: 1 drink per week  Marital Status:        Review of Systems   Constitutional: Negative for chills and fever.   Eyes: Negative for visual disturbance.   Respiratory: Negative for chest tightness, shortness of breath and wheezing.    Cardiovascular: Negative for chest pain, palpitations and leg swelling.   Gastrointestinal: Positive for nausea and vomiting. Negative for abdominal pain and diarrhea.   Genitourinary: Negative for dysuria.   Musculoskeletal: Negative for back pain.   Neurological:  "Positive for weakness and light-headedness. Negative for headaches.   All other systems reviewed and are negative.      Physical Exam   First Vitals: /62  Pulse 72  Temp 98  F (36.7  C) (Oral)  Resp 18  Ht 1.676 m (5' 6\")  SpO2 97%    Physical Exam  Nursing note and vitals reviewed.    Constitutional:  Appears well-developed and well-nourished, comfortable.    HENT:     No evidence of facial or scalp trauma.      Nose normal.  No discharge.  He is slightly hard of hearing.     Oropharynx is clear and moist.  Eyes:    Conjunctivae are normal without injection. No lid droop.     Pupils are equal, round, and reactive to light.      Right eye exhibits no discharge. Left eye exhibits no discharge.      No scleral icterus.  Lymph:  No enlarged or tender cervical or submandibular lymph nodes.   Cardiovascular:  Normal rate, regular rhythm with normal S1 and S2.      Normal heart sounds and peripheral pulses 2+ and equal.       No murmur or tez.  Pulmonary:  Effort normal and breath sounds clear to auscultation bilaterally.          No respiratory distress.  No stridor.     No wheezes. No rales. No chest wall tenderness.    GI:    Soft. No distension and no mass. No tenderness.      No rebound and no guarding. No flank pain.  No HSM.  Musculoskeletal:  Normal range of motion. No extremity deformity.     No edema and no tenderness.  No cyanosis.   Neurological:   GCS 15. NIH stroke scale score 0. O X 3.  No sensory deficit. Normal strength and sensation.      Normal coordination. Normal finger to nose. Normal heel-knee-shin. No hand drift. No leg drift. Visual fields full.      EOMs intact. No diplopia. No facial droop. No slurred speech. Mental status and memory normal.      Comprehension and expression of speech is normal.   Skin:    Skin is warm and dry. No rash noted. No diaphoresis.      No erythema. No pallor.  No lesions.  Psychiatric:   Behavior is normal. Appropriate mood and affect.     Judgment and " thought content normal.         Emergency Department Course     ECG (20:37:53):  Rate 79 bpm. DE interval 158. QRS duration 126. QT/QTc 438/502. P-R-T axes 75 -72 52. Sinus rhythm with frequent , and consecutive Premature ventricular complexes  Possible Left atrial enlargement  Left axis deviation  Right bundle branch block  Abnormal ECG  When compared with ECG of 31-DEC-2017 11:26,  Premature ventricular complexes are now Present Interpreted by Sybil Fontaine MD.     ECG (1:06:17):  Indication: Dizziness  Rate 97 bpm. DE interval 190. QRS duration 130. QT/QTc 390/495. P-R-T axes 84 -72 120.  Normal sinus rhythm  Possible left atrial enlargement  Left axis deviation  Right bundle branch block  Septal infarct, age undetermined  T wave abnormality, consider lateral ischemia  Abnormal ECG  When compared with ECG of 31-DEC-2017 11:26,  Premature ventricular complexes are now Present Interpreted by Sybil Fontaine MD    Imaging:  Radiographic findings were communicated with the patient who voiced understanding of the findings.    Chest CT, IV contrast only - PE protocol   Preliminary Result   IMPRESSION:    1. No visualized pulmonary embolus.   2. No convincing evidence of active pulmonary disease.      CT Head w/o Contrast   Final Result   IMPRESSION: Diffuse cerebral volume loss and cerebral white matter   changes consistent with chronic small vessel ischemic disease. No   evidence for acute intracranial pathology.      Radiation dose for this scan was reduced using automated exposure   control, adjustment of the mA and/or kV according to patient size, or   iterative reconstruction technique.      LISANDRO MAGALLON MD      Chest XR,  PA & LAT   Final Result   IMPRESSION: Clear lungs.      LISANDRO MAGALLON MD      CT Abdomen Pelvis w/o Contrast    (Results Pending)     Laboratory:  Lactic acid 2102: 4.8 (HH)  Lactic Acid 2305: 3.5 (H)  Procalcitonin: <0.05  CBC: RBC 4.14L, HGB 12.0L, HCT 38.2L, MCHC 31.4L, otherwise  within normal limits   CMP: Creatinine 1.06, glucose 183 (H)  Troponin (2035): 0.020  Troponin POCT (0119): 0.00  D dimer: 0.7 (H)    Interventions:  2052: NS 1L IV Bolus  2109: Zofran 4 mg IV  2148 NS Bolus 1,000mL IV  2112: Meclizine 50 mg PO  2300: Zosyn 4.5 g IV  2353: Zofran 4 mg IV  2354: NS Bolus 1,000mL IV  0103: Reglan 10 mg IV  0118: Fentanyl 25 mcg IV    Emergency Department Course:  Past medical records, nursing notes, and vitals reviewed.  2031: I performed an exam of the patient and obtained history, as documented above.      Patient arrives via EMS into the stabilization room.    Impression & Plan      CMS Diagnoses: The Lactic acid level is elevated due to other cause, possibly heart, at this time there is no sign of severe sepsis or septic shock.    Medical Decision Making:  When the patient arrived, I made sure that there was no findings to suggest stroke.  His NIH score was 0 and I did not call a code stroke.  His dizziness was room spinning and he was given meclizine for this after some Zofran.  The dizziness improved significantly.  I started a sepsis workup because his blood pressures were in the 90s and his lactic acid came back elevated at 4.8.  He was given 30 cc/kg bolus of normal saline and another dose of Zofran IV.  Blood cultures were obtained.  He had a hard time giving urine sample and ultimately had to be catheterized for a specimen.  Zosyn was then given IV as I did not have a source for infection and was not convinced that this was infectious in etiology.  He was also noted after arrival for having oxygen saturations in the upper 80s and was placed on O2.  He denied shortness of breath however and has not had any history of significant pulmonary disease and does not wear oxygen normally.  His CBC came back with a normal white count, he was not febrile here.  Comp metabolic panel showed an elevated glucose 183 but otherwise unremarkable troponin was normal at 0.020 and his EKG  showed some PVCs but otherwise unchanged from previous EKG.  Chest x-ray did not reveal any pneumonia.  I did get a d-dimer at 0.7 because of the hypoxia and it was normal corrected for age.  However with the hypoxia and I did not have a source for the elevated lactate decided to get a chest CT for ruling out pulmonary embolism.  It was normal.  No heart failure no pneumonia no aneurysm no PE.  He continued to have intermittent nausea and a second dose of Zofran was given and then Reglan 10 mg IV.  It was not until after I had talked to the hospital so he came in the room he stated that he started getting a little bit of chest tightness which was new.  I repeated the EKG and now there were some ST segment depressions in V3 and V4 that seem to be new.  There was no STEMI or ST elevation.  I did a stat repeat troponin and it came back negative.  Pro-calcitonin was added and it was normal.  Repeat lactate after his full bolus was in was down to 3.5.  His blood pressures did respond nicely to the fluids and now he is in the 120s systolic consistently.  It is possible he may have had some mild dehydration but I am concerned about the possibility of coronary disease or cardiomyopathy causing global heart dysfunction.  He had had an echo not too long ago and he does have some global hypokinesis and reduced cardiac function, also noncritical aortic stenosis.  I talked to Dr. Sharma who is here seeing the patient and we reviewed the EKG and the findings in the 2 troponins and he is going to admit the patient to the [facility] for further management and workup.  He will need a cardiac echo first thing in the morning.  Because of the recurrent vomiting unexplained at this time, Dr. Sharma and I felt that he should have an abdominal CT and it was done and was normal.  The pain got worse in his chest and I was concerned about giving him nitro and dropping his pressure.  So I gave him 2 separate doses of fentanyl 25 mcg each and he  held his pressure up and his pain got a little bit better.  His nausea persisted despite the Reglan and the 2 doses of Zofran so he was given 2 mg of IV Haldol to see if this would dottie his nausea.  He will get further serial troponins and cardiology will be consulted in the morning as well.  We will continue the antibiotics at this time, I added vancomycin but I still do not have any evidence of infection.  I still do not believe that this is sepsis or septic shock but could be related to a cardiac pump problem.  Blood pressure has remained stable and he will be transferred to the [facility].    Critical Care time:  was 120 minutes for this patient excluding procedures.    Diagnosis:    ICD-10-CM    1. Hypoxia R09.02 Procalcitonin     Troponin POCT     Troponin POCT   2. Elevated lactic acid level R79.89     Does not meet SIRS criteria   3. Non-intractable vomiting with nausea, unspecified vomiting type R11.2    4. Acute chest pain R07.9    5. Dizziness R42        Disposition:  Admitted to Dr Sharma.  Serial troponins, cardiac echo, continue working on medication for his chest pain.  Nausea management, IV fluids.  Would hold on heparin at this time.    I, Dariela Mcelroy, am serving as a scribe at 2031 on 5/26/2018 to document services personally performed by Sybil Fontaine MD based on my observations and the provider's statements to me.     EMERGENCY DEPARTMENT       Sybil Fontaine MD  05/27/18 4821

## 2018-05-27 NOTE — PLAN OF CARE
Problem: Patient Care Overview  Goal: Plan of Care/Patient Progress Review  Received from ED at 0300 per cart, A&O, [place].  Connected to monitor, IMC, SR with PVC's.  Denies pain.  BG checked and covered per SSI.  Wife at bedside and left around 0500, clothing and glasses left with pt, hearing aid with wife.  Up to side of bed with assist of 1, voiding per urinal.  Dr. Sharma aware of elevated troponin, Cardiology consult in place, will continue to monitor.

## 2018-05-27 NOTE — H&P
[facility]    History and Physical  Hospitalist       Date of Admission:  5/26/2018  Date of Service (when I saw the patient): 05/27/18    Assessment & Plan   Dustin Pearce is a markedly pleasant 89 year old gentleman with extensive past medical history most notable for CAD, chronic systolic CHF, Diabetes mellitus, moderate aortic stenosis, PAD status post lower extremity angioplasties, mild left carotid artery stenosis, chronic paroxysmal afib, GERD, hypertension and dyslipidemia who presents with dizziness, nausea and vomiting and chest pain and is found to have hypotension, lactic acidosis, and hypoxia, concerning for severe sepsis of unclear etiology vs possible ACS.    1) Severe sepsis suspected: In the ED Mr. Pearce is very ill appearing, actively vomiting. Initial BP 89/50, improved to 140/71 with IV fluid bolus. Hypoxic to upper 80 percent range on room air. He is afebrile and pulse 72. CBC showed WBC 9.5, HGB 12, . Lactate is 4.8, improved to 3.5 with IV fluid. CMP unremarkable. D Dimer mildly elevated. CT PA negative for acute pathology as is CXR. EKG shows NSR with ST segment depressions in V3-4; however, two Troponins are negative, as is Procalcitonin. UA is relatively bland (3 WBC). Blood cultures were sent and he was given anti-emetics as well as Vancomycin and Zosyn.    Overall he could have severe sepsis of unclear cause such as bacteremia or intra-abdominal process. He could have viral gastroenteritis. He was hospitalized here 2/2018 for very similar presentation and found to have E faecalis UTI and could have recurrent UTI. He could have evolving ACS though that seems less likely at present. His wife initially was concerned for stroke but he has no focal neurologic deficits at this time making stroke less likely at present as well. His right leg drag could have been a TIA exacerbated by hypertension (note being made of hospitalization for TIA in 12/2017). Last, review of  notes in Epic suggests that he has chronic orthostatic hypotension.     -- IMC    -- Cautious IV fluid 75 cc/hour noting systolic CHF    -- Serial Troponins, TTE ordered. Hold off on Heparin for now unless these turn positive. LOw threshold for Cardiology consultation.    -- Zosyn continued for now; follow up blood and urine cultures    -- CT abdomen and pelvis ordered in the ED; results pending    -- Q 4 neuro checks; for any concerning changes would call a code stroke (again, noting a non-focal exam in the ED)    -- NPO; Zofran, Compazine, Reglan prn    2) CAD, chronic systolic CHF: He has a history of remote MI. He is followed by [facility]. Most recent TTE 1/2018 showed EF 35-40% with multiple WMA's, and moderate AS and mild aortic root dilation. Recent Nuclear Stress test in 2017 reportedly showed persistent reversible inferior wall ischemia.     -- Resume aspirin, Plavix, Toprol, Lisinopril, and statin when verified and in the case of anti hypertensives, must also verify resolution of hypotension    3) Prior TIA: Plavix was added to aspirin in 12/2017.     4) Type 2 Diabetes mellitus: Hold Metformin and Glipizide, use ISS insulin while hospitalized. Check A1c.    5) Anemia: Mild, monitor    6) Chronic depression, GERD, BPH: Resume Cymbalta, PPI, when verified; hold Flomax for hypotension    # Pain Assessment:  Current Pain Score 5/27/2018   Patient currently in pain? -   Pain score (0-10) 4   Pain location -   Pain descriptors -   Dustin s pain level was assessed and he currently denies pain.      DVT Prophylaxis: Pneumatic Compression Devices  Code Status: Full Code, briefly discussed    Disposition: Expected discharge in 2-3 days    Amilcar Sharma MD    Primary Care Physician    **Matt Morrissey    Chief Complaint   Dizziness    History is obtained from the patient, his wife at the bedside, and the ED physician whom I have spoken with    History of Present Illness   Dustin Pearce is a markedly  pleasant 89 year old gentleman who presents with dizziness. He says this started tonight after dinner; he and his wife were out at a nearby restaurant and finished the meal, and he got up to leave and got about 10 steps when he developed sudden onset dizziness, and nearly fell down; his wife says she had to support him. She noticed his right leg dragging on the ground. He sat down and the dizziness resolved, then got up again but it recurred and he almost fell again, and this repeated itself several times; at one point, his wife adds that he actually lost consciousness for about 10-15 seconds. Eventually staff at the restaurant brought him a wheelchair and he used this to get into his car, where he developed an episode of nausea and vomiting; his wife brought him into the ED. While here he has vomited multiple more times; it is non-bloody, non-bilious and has now become associated with diffuse chest tightness radiating across his chest (not his back or belly; he denies abdominal pain or diarrhea, or recent melena or hematochezia). He denies recent fever, chills, sweats, or cough, dyspnea, runny nose, sore throat, or dysuria. His wife lays out all his medications and denies any recent changes. She adds that he has been hospitalized a few times for similar symptoms, most recently over the winter when he was found to have a stroke, and then again 2/2018 when he had a UTI. However he looks much sicker to her tonight than he did then. Overall, he denies any dysarthria or other speech changes, or focal weakness of arm or leg, or loss of sensation or paresthesias, or any other acute complaints.    Past Medical History    I have reviewed this patient's medical history and updated it with pertinent information if needed.   Past Medical History:   Diagnosis Date     Aortic root dilatation (H)      Aortic stenosis      Benign essential hypertension 1/6/2017     Benign non-nodular prostatic hyperplasia with lower urinary tract  symptoms 10/20/2016     CAD (coronary artery disease)     MI 1970     Cardiomyopathy (H)      Carotid artery stenosis 10/20/2016    chronically occluded left internal carotid artery     Carotid occlusion, left      Coronary artery disease involving native coronary artery of native heart without angina pectoris 10/20/2016     Diabetes mellitus (H)      DJD (degenerative joint disease)      Gastro-oesophageal reflux disease      Gastroesophageal reflux disease without esophagitis 10/20/2016     Heart attack      Hyperlipidemia      Hypertension      Mild cognitive impairment 10/20/2016     Nephrolithiasis     RECURRENT     Osteopenia      PAD (peripheral artery disease) (H)     peripheral angiogram 5/2017: The common iliac artery stenoses were stented and the superficial femoral artery stenoses were angioplastied     Paroxysmal atrial fibrillation (H)      PONV (postoperative nausea and vomiting)      RBBB with left anterior fascicular block      Unspecified cerebral artery occlusion with cerebral infarction        Past Surgical History   I have reviewed this patient's surgical history and updated it with pertinent information if needed.  Past Surgical History:   Procedure Laterality Date     AMPUTATE FOOT Left 6/4/2017    Procedure: AMPUTATE FOOT;  Sun Add#3: partial left foot amputation - Sagital Saw - Mini C-Arm;  Surgeon: Moe Kirk DPM;  Location:  OR     CHOLECYSTECTOMY       ENDARTERECTOMY CAROTID Right 1/3/2018    Procedure: ENDARTERECTOMY CAROTID;  RIGHT CAROTID ENDARTERECTOMY WITH EEG;  Surgeon: Roland Rodriguez MD;  Location:  OR     ESOPHAGOSCOPY, GASTROSCOPY, DUODENOSCOPY (EGD), COMBINED N/A 12/26/2016    Procedure: COMBINED ESOPHAGOSCOPY, GASTROSCOPY, DUODENOSCOPY (EGD);  Surgeon: Nasim Louis MD;  Location:  GI     GENITOURINARY SURGERY      KIDNEY STONE REMOVAL X 4     HC UGI ENDOSCOPY W EUS N/A 12/29/2016    Procedure: COMBINED ENDOSCOPIC ULTRASOUND, ESOPHAGOSCOPY,  GASTROSCOPY, DUODENOSCOPY (EGD);  Surgeon: Nasim Louis MD;  Location:  GI     HERNIA REPAIR       INCISION AND DRAINAGE FOOT, COMBINED Left 6/6/2017    Procedure: COMBINED INCISION AND DRAINAGE FOOT;  REVISIONAL LEFT FOOT INCISION AND DRAINAGE WITH POSSIBLE FLAP CLOSURE , ANTIBIOTIC SOLUTION ;  Surgeon: Nabil Ann DPM;  Location:  OR     LASER HOLMIUM LITHOTRIPSY URETER(S), INSERT STENT, COMBINED  3/2/2012    Procedure:COMBINED CYSTOSCOPY, URETEROSCOPY, LASER HOLMIUM LITHOTRIPSY URETER(S), INSERT STENT; CYSTOSCOPY, RIGHT RETROGRADES, RIGHT URETEROSCOPY, HOLMIUM LASER, RIGHT STENT PLACEMENT; Surgeon:ANJELICA LEÓN; Location: OR     ROTATOR CUFF REPAIR RT/LT         Prior to Admission Medications   Prior to Admission Medications   Prescriptions Last Dose Informant Patient Reported? Taking?   AMITRIPTYLINE HCL PO  Spouse/Significant Other Yes No   Sig: Take 25 mg by mouth nightly as needed for sleep   ATORVASTATIN CALCIUM PO  Spouse/Significant Other Yes No   Sig: Take 40 mg by mouth At Bedtime    Cholecalciferol 65466 UNITS TABS   Yes No   Sig: Take 1 tablet by mouth daily every Sun for deficiancy   Clopidogrel Bisulfate, [id number], (PLAVIX PO)  Spouse/Significant Other Yes No   Sig: Take 75 mg by mouth daily    DULoxetine (CYMBALTA) 30 MG EC capsule  Spouse/Significant Other No No   Sig: Take 1 capsule (30 mg) by mouth daily   LISINOPRIL PO  Spouse/Significant Other Yes No   Sig: Take 2.5 mg by mouth daily    METFORMIN HCL PO  Spouse/Significant Other Yes No   Sig: Take 1,000 mg by mouth 2 times daily (with meals)   OMEPRAZOLE PO  Spouse/Significant Other Yes No   Sig: Take 40 mg by mouth 2 times daily    TRUE METRIX BLOOD GLUCOSE TEST test strip   No No   Sig: USE TO TEST BLOOD SUGAR THREE TIMES DAILY OR AS DIRECTED   amoxicillin (AMOXIL) 875 MG tablet   No No   Sig: Take 1 tablet (875 mg) by mouth 2 times daily   Patient not taking: Reported on 4/25/2018   aspirin EC 81 MG EC  "tablet  Spouse/Significant Other No No   Sig: Take 1 tablet (81 mg) by mouth daily   bisacodyl (DULCOLAX) 10 MG Suppository   Yes No   Sig: Place 10 mg rectally daily as needed for constipation   glipiZIDE (GLUCOTROL) 10 MG tablet  Spouse/Significant Other Yes No   Sig: Take 1 tablet (10 mg) by mouth 2 times daily (before meals)   ipratropium (ATROVENT) 0.03 % spray  Spouse/Significant Other Yes No   Sig: Spray 2 sprays into both nostrils 2 times daily as needed    magnesium oxide (MAG-OX) 400 (241.3 Mg) MG tablet   No No   Sig: TAKE 1 TABLET BY MOUTH TWICE DAILY   metoprolol (TOPROL-XL) 25 MG 24 hr tablet  Spouse/Significant Other No No   Sig: Take 0.5 tablets (12.5 mg) by mouth daily   ondansetron (ZOFRAN) 4 MG tablet   Yes No   Sig: Take 4 mg by mouth 4 times daily as needed for nausea   order for DME  Spouse/Significant Other No No   Sig: Equipment being ordered: True [employer] Blood Glucose meter.   polyethylene glycol (MIRALAX/GLYCOLAX) Packet  Spouse/Significant Other Yes No   Sig: Take 17 g by mouth daily as needed for constipation   tamsulosin (FLOMAX) 0.4 MG capsule  Spouse/Significant Other No No   Sig: Take 1 capsule (0.4 mg) by mouth daily      Facility-Administered Medications: None     Allergies   Allergies   Allergen Reactions     Oxycodone Other (See Comments)     \"TERRIBLE SWEATING\"     Sulfa Drugs      Tetracycline        Social History   I have reviewed this patient's social history and updated it with pertinent information if needed. Dustin Pearce  reports that he quit smoking about 19 years ago. His smoking use included Cigarettes. He has a 30.00 pack-year smoking history. He has never used smokeless tobacco. He reports that he drinks about 4.2 oz of alcohol per week  He reports that he does not use illicit drugs.    Family History   Family history assessed and, except as above, is non-contributory.    Family History   Problem Relation Age of Onset     Breast Cancer Mother      Heart Failure " Father 81       Review of Systems   The 10 point Review of Systems is negative other than noted in the HPI or here.     Physical Exam   Temp: 98  F (36.7  C) Temp src: Oral BP: (!) 119/96 (Simultaneous filing. User may not have seen previous data.) Pulse: 72 Heart Rate: 106 Resp: 15 SpO2: 98 % O2 Device: None (Room air)    Vital Signs with Ranges  Temp:  [98  F (36.7  C)] 98  F (36.7  C)  Pulse:  [72] 72  Heart Rate:  [] 106  Resp:  [14-24] 15  BP: ()/(47-96) 119/96  SpO2:  [96 %-100 %] 98 %  0 lbs 0 oz    Constitutional: Alert and oriented to person, place and time; vomiting  HEENT: normocephalic dry mucus membranes  Respiratory: lungs clear to auscultation bilaterally  Cardiovascular: regular S1 S2 2/6 freeman at upper sternum  GI: abdomen soft non tender non distended bowel sounds positive  Lymph/Hematologic: no pallor, no cervical lymphadenopathy  Skin: no rash, good turgor  Musculoskeletal: no clubbing, cyanosis or edema  Neurologic: extra-ocular muscles intact; moves all four extremities  Psychiatric: appropriate affect, insight and judgment    Data   Data reviewed today:  I personally reviewed the EKG tracing showing NSR with PVC's; ST segment depressions in V3-4.    Recent Labs  Lab 05/27/18  0119 05/26/18 2035   WBC  --  9.5   HGB  --  12.0*   MCV  --  92   PLT  --  189   NA  --  141   POTASSIUM  --  4.4   CHLORIDE  --  106   CO2  --  23   BUN  --  24   CR  --  1.06   ANIONGAP  --  12   ALLISON  --  8.8   GLC  --  183*   ALBUMIN  --  3.5   PROTTOTAL  --  7.0   BILITOTAL  --  0.3   ALKPHOS  --  69   ALT  --  18   AST  --  20   TROPI  --  0.020   TROPONIN 0.00  --        Recent Results (from the past 24 hour(s))   CT Head w/o Contrast    Narrative    CT OF THE HEAD WITHOUT CONTRAST 5/26/2018 9:24 PM     COMPARISON: Brain MR 1/1/2018.    HISTORY: Dizziness tonight, on thinners, syncope.      TECHNIQUE: 5 mm thick axial CT images of the head were acquired  without IV contrast material.    FINDINGS: There  is moderate diffuse cerebral volume loss. There are  subtle patchy areas of decreased density in the cerebral white matter  bilaterally that are consistent with sequela of chronic small vessel  ischemic disease. A moderate sized left frontal ischemic infarct is  again noted without change.    The ventricles and basal cisterns are within normal limits in  configuration given the degree of cerebral volume loss.  There is no  midline shift. There are no extra-axial fluid collections.    No intracranial hemorrhage, mass or recent infarct.    The visualized paranasal sinuses are well-aerated. There is no  mastoiditis. There are no fractures of the visualized bones.      Impression    IMPRESSION: Diffuse cerebral volume loss and cerebral white matter  changes consistent with chronic small vessel ischemic disease. No  evidence for acute intracranial pathology.    Radiation dose for this scan was reduced using automated exposure  control, adjustment of the mA and/or kV according to patient size, or  iterative reconstruction technique.    LISANDRO MAGALLON MD   Chest XR,  PA & LAT    Narrative    CHEST TWO VIEWS    5/26/2018 10:52 PM     COMPARISON: Frontal chest x-ray 8/19/2009.    HISTORY: Hypoxia.    FINDINGS: The cardiac silhouette, pulmonary vasculature, lungs and  pleural spaces are within normal limits.      Impression    IMPRESSION: Clear lungs.    LISANDRO MAGALLON MD   Chest CT, IV contrast only - PE protocol    Narrative    CT CHEST WITH CONTRAST   5/26/2018 11:50 PM     HISTORY: Hypoxia. Dizziness.    COMPARISON: 2/8/2018-CT abdomen and pelvis.    TECHNIQUE: Following the uneventful administration of 64 mL Isovue-370  intravenous contrast, helical sections were acquired through the lungs  according to the pulmonary embolism protocol. Coronal reconstructions  were generated. Radiation dose for this scan was reduced using  automated exposure control, adjustment of the mA and/or kV according  to the patient's size, or  iterative reconstruction technique.    FINDINGS: No visualized pulmonary embolus. The thoracic aorta is  normal in caliber without dissection. Atherosclerotic calcification in  the thoracic aorta and coronary arteries.    Minimal emphysematous changes in the lungs. A band-like opacity in the  lateral aspect of the mid left lung likely represents atelectasis or  scarring. The lungs are otherwise clear. No pleural or pericardial  effusion. No enlarged lymph nodes in the chest. Small hiatal hernia.    Scan through the upper abdomen is significant for a few nonobstructing  calculi in both kidneys, largest a 0.6 cm right renal calculus, and a  4.6 cm cyst containing a thin internal septation in the upper pole of  the left kidney.      Impression    IMPRESSION:   1. No visualized pulmonary embolus.  2. No convincing evidence of active pulmonary disease.

## 2018-05-27 NOTE — CONSULTS
[facility]    Cardiology Consultation     Date of Admission:  5/26/2018    Assessment & Plan   Dustin Pearce is a 89 year old male who was admitted on 5/26/2018. I was asked to see the patient for troponin elevation in setting of sepsis, chronic systolic CHF, moderate AS, diabetes mellitus, s/p lower extremity angioplasty, chronic paroxysmal afib, GERD, hypertension, dyslipidemia admitted for CP, hypotension, hypoxia, lactic acidosis being managed for sepsis. We are consulted regarding troponin elevation.     Given absence of symptoms, it is likely that trop elevation represents demand ischemia. ECG does show new ST depressions in lateral leads. Discussed possibility of posterior STEMI but given similar echo findings and continued asympomatic nature, on top of ongoing active sepsis and his desire for more conservative management recommend treatment of underlying sepsis.      CTPE negative for PE. HE is beig managed with fluid resuscitation, antibiotics for sepsis secodnary to viral gastroenteritis vs. UTI given history of E. Faecalis UTI 2/2018.     1) Troponin elevation  History of remote MI, TTE 1/2018 showed EF 35-40%, moderate AS, ild aortic root dilatation with Nuclear Stress which showed inferior wall ischemia, reversible.      ECG showed some ST depressions in the lateral leads and PVCs. Repeat echo showed EF 35-40% with inferior and inferolateral wall hypokinesis consistent with previous nuclear study and echo. Wall motion abnormalities are similar to echo 12/2017.     For these reasons we will hold off on further invasive therapies and attribute troponin elevation to demand ischemia. Later angiogram is reasonable if the patient is symptomatic, however he says he remains asymptomatic without any chest pain or SOB w exertion.   -continue ASA  -continue Plavix  -continue statin  -continue toprol, lisinopril pending improvement in BP  -echocardiogram to evaluate new RMWA or new valvular  abnormalities in setting of sepsis      2) Sepsis  -cpntinue current management  - likely contributing to demand ischemia picture    3)   Anemia    -monitor in setting of demand ischemia  -currently 10.7, and this is appropriate.       Lucie Soto MD    Code Status    Full Code    Reason for Consult   Reason for consult: I was asked to evaluate this patient for troponin elevation     Primary Care Physician   Matt Morrissey    Chief Complaint   Trop elevation    History is obtained from the patient    History of Present Illness  Dustin Pearce is a markedly pleasant 89 year old gentleman who presents with dizziness. He says this started tonight after dinner; he and his wife were out at a nearby restaurant and finished the meal, and he got up to leave and got about 10 steps when he developed sudden onset dizziness, and nearly fell down; his wife says she had to support him. She noticed his right leg dragging on the ground. He sat down and the dizziness resolved, then got up again but it recurred and he almost fell again, and this repeated itself several times; at one point, his wife adds that he actually lost consciousness for about 10-15 seconds. Eventually staff at the restaurant brought him a wheelchair and he used this to get into his car, where he developed an episode of nausea and vomiting; his wife brought him into the ED. While here he has vomited multiple more times; it is non-bloody, non-bilious and has now become associated with diffuse chest tightness radiating across his chest (not his back or belly; he denies abdominal pain or diarrhea, or recent melena or hematochezia). He denies recent fever, chills, sweats, or cough, dyspnea, runny nose, sore throat, or dysuria. His wife lays out all his medications and denies any recent changes. She adds that he has been hospitalized a few times for similar symptoms, most recently over the winter when he was found to have a stroke, and then again 2/2018 when he  had a UTI. However he looks much sicker to her tonight than he did then. Overall, he denies any dysarthria or other speech changes, or focal weakness of arm or leg, or loss of sensation or paresthesias, or any other acute complaints.    Denies any recent other chest pain shortness of breath or cardiovascular symptoms similar to previous stents.   Past Medical History   I have reviewed this patient's medical history and updated it with pertinent information if needed.   Past Medical History:   Diagnosis Date     Aortic root dilatation (H)      Aortic stenosis      Benign essential hypertension 1/6/2017     Benign non-nodular prostatic hyperplasia with lower urinary tract symptoms 10/20/2016     CAD (coronary artery disease)     MI 1970     Cardiomyopathy (H)      Carotid artery stenosis 10/20/2016    chronically occluded left internal carotid artery     Carotid occlusion, left      Coronary artery disease involving native coronary artery of native heart without angina pectoris 10/20/2016     Diabetes mellitus (H)      DJD (degenerative joint disease)      Gastro-oesophageal reflux disease      Gastroesophageal reflux disease without esophagitis 10/20/2016     Heart attack      Hyperlipidemia      Hypertension      Mild cognitive impairment 10/20/2016     Nephrolithiasis     RECURRENT     Osteopenia      PAD (peripheral artery disease) (H)     peripheral angiogram 5/2017: The common iliac artery stenoses were stented and the superficial femoral artery stenoses were angioplastied     Paroxysmal atrial fibrillation (H)      PONV (postoperative nausea and vomiting)      RBBB with left anterior fascicular block      Unspecified cerebral artery occlusion with cerebral infarction        Past Surgical History   I have reviewed this patient's surgical history and updated it with pertinent information if needed.  Past Surgical History:   Procedure Laterality Date     AMPUTATE FOOT Left 6/4/2017    Procedure: AMPUTATE FOOT;   Sun Add#3: partial left foot amputation - Sagital Saw - Mini C-Arm;  Surgeon: Moe Kirk DPM;  Location:  OR     CHOLECYSTECTOMY       ENDARTERECTOMY CAROTID Right 1/3/2018    Procedure: ENDARTERECTOMY CAROTID;  RIGHT CAROTID ENDARTERECTOMY WITH EEG;  Surgeon: Roland Rodriguez MD;  Location:  OR     ESOPHAGOSCOPY, GASTROSCOPY, DUODENOSCOPY (EGD), COMBINED N/A 12/26/2016    Procedure: COMBINED ESOPHAGOSCOPY, GASTROSCOPY, DUODENOSCOPY (EGD);  Surgeon: Nasim Louis MD;  Location:  GI     GENITOURINARY SURGERY      KIDNEY STONE REMOVAL X 4     HC UGI ENDOSCOPY W EUS N/A 12/29/2016    Procedure: COMBINED ENDOSCOPIC ULTRASOUND, ESOPHAGOSCOPY, GASTROSCOPY, DUODENOSCOPY (EGD);  Surgeon: Nasim Louis MD;  Location:  GI     HERNIA REPAIR       INCISION AND DRAINAGE FOOT, COMBINED Left 6/6/2017    Procedure: COMBINED INCISION AND DRAINAGE FOOT;  REVISIONAL LEFT FOOT INCISION AND DRAINAGE WITH POSSIBLE FLAP CLOSURE , ANTIBIOTIC SOLUTION ;  Surgeon: Nabil Ann DPM;  Location:  OR     LASER HOLMIUM LITHOTRIPSY URETER(S), INSERT STENT, COMBINED  3/2/2012    Procedure:COMBINED CYSTOSCOPY, URETEROSCOPY, LASER HOLMIUM LITHOTRIPSY URETER(S), INSERT STENT; CYSTOSCOPY, RIGHT RETROGRADES, RIGHT URETEROSCOPY, HOLMIUM LASER, RIGHT STENT PLACEMENT; Surgeon:ANJELICA LEÓN; Location: OR     ROTATOR CUFF REPAIR RT/LT         Prior to Admission Medications   Prior to Admission Medications   Prescriptions Last Dose Informant Patient Reported? Taking?   AMITRIPTYLINE HCL PO  Spouse/Significant Other Yes No   Sig: Take 25 mg by mouth nightly as needed for sleep   ATORVASTATIN CALCIUM PO  Spouse/Significant Other Yes No   Sig: Take 40 mg by mouth At Bedtime    Cholecalciferol 06195 UNITS TABS   Yes No   Sig: Take 1 tablet by mouth daily every Sun for deficiancy   Clopidogrel Bisulfate, [id number], (PLAVIX PO)  Spouse/Significant Other Yes No   Sig: Take 75 mg by mouth daily     DULoxetine (CYMBALTA) 30 MG EC capsule  Spouse/Significant Other No No   Sig: Take 1 capsule (30 mg) by mouth daily   LISINOPRIL PO  Spouse/Significant Other Yes No   Sig: Take 2.5 mg by mouth daily    METFORMIN HCL PO  Spouse/Significant Other Yes No   Sig: Take 1,000 mg by mouth 2 times daily (with meals)   OMEPRAZOLE PO  Spouse/Significant Other Yes No   Sig: Take 40 mg by mouth 2 times daily    TRUE METRIX BLOOD GLUCOSE TEST test strip   No No   Sig: USE TO TEST BLOOD SUGAR THREE TIMES DAILY OR AS DIRECTED   amoxicillin (AMOXIL) 875 MG tablet   No No   Sig: Take 1 tablet (875 mg) by mouth 2 times daily   Patient not taking: Reported on 4/25/2018   aspirin EC 81 MG EC tablet  Spouse/Significant Other No No   Sig: Take 1 tablet (81 mg) by mouth daily   bisacodyl (DULCOLAX) 10 MG Suppository   Yes No   Sig: Place 10 mg rectally daily as needed for constipation   glipiZIDE (GLUCOTROL) 10 MG tablet  Spouse/Significant Other Yes No   Sig: Take 1 tablet (10 mg) by mouth 2 times daily (before meals)   ipratropium (ATROVENT) 0.03 % spray  Spouse/Significant Other Yes No   Sig: Spray 2 sprays into both nostrils 2 times daily as needed    magnesium oxide (MAG-OX) 400 (241.3 Mg) MG tablet   No No   Sig: TAKE 1 TABLET BY MOUTH TWICE DAILY   metoprolol (TOPROL-XL) 25 MG 24 hr tablet  Spouse/Significant Other No No   Sig: Take 0.5 tablets (12.5 mg) by mouth daily   ondansetron (ZOFRAN) 4 MG tablet   Yes No   Sig: Take 4 mg by mouth 4 times daily as needed for nausea   order for DME  Spouse/Significant Other No No   Sig: Equipment being ordered: True Matrix Blood Glucose meter.   polyethylene glycol (MIRALAX/GLYCOLAX) Packet  Spouse/Significant Other Yes No   Sig: Take 17 g by mouth daily as needed for constipation   tamsulosin (FLOMAX) 0.4 MG capsule  Spouse/Significant Other No No   Sig: Take 1 capsule (0.4 mg) by mouth daily      Facility-Administered Medications: None     Allergies   Allergies   Allergen Reactions      "Oxycodone Other (See Comments)     \"TERRIBLE SWEATING\"     Sulfa Drugs      Tetracycline        Social History   I have reviewed this patient's social history and updated it with pertinent information if needed. Dustin Pearce  reports that he quit smoking about 19 years ago. His smoking use included Cigarettes. He has a 30.00 pack-year smoking history. He has never used smokeless tobacco. He reports that he drinks about 4.2 oz of alcohol per week  He reports that he does not use illicit drugs.    Family History   I have reviewed this patient's family history and updated it with pertinent information if needed.   Family History   Problem Relation Age of Onset     Breast Cancer Mother      Heart Failure Father 81       Review of Systems   The 10 point Review of Systems is negative other than noted in the HPI or here.     Physical Exam   Temp: 97.8  F (36.6  C) Temp src: Oral BP: 128/54 Pulse: 72 Heart Rate: 67 Resp: 16 SpO2: 95 % O2 Device: None (Room air) Oxygen Delivery: 2 LPM  Vital Signs with Ranges  Temp:  [97.8  F (36.6  C)-98.7  F (37.1  C)] 97.8  F (36.6  C)  Pulse:  [72] 72  Heart Rate:  [] 67  Resp:  [8-25] 16  BP: ()/(47-96) 128/54  SpO2:  [94 %-100 %] 95 %  166 lbs 0 oz    Constitutional: Awake, alert, cooperative, no apparent distress, and appears stated age.  Eyes: Lids and lashes normal,   ENT: Normocephalic, without obvious abnormality,   Respiratory:no wheezing clear  Cardiovascular: Normal apical impulse, regular rate and rhythm, normal S1 and S2, no S3 or S4, and no murmur noted.  GI: No scars, normal bowel sounds, soft, non-distended,   Skin: No bruising or bleeding, normal skin color,   Musculoskeletal: There is no redness, warmth, or swelling of the joints.    Neurologic: Awake, alert, oriented to name, place and time  Neuropsychiatric: Calm, normal eye contact, alert, normal affect, oriented to self, place, time and situation, memory for past and recent events intact and thought " process normal.    Data   Results for orders placed or performed during the hospital encounter of 05/26/18 (from the past 24 hour(s))   CBC with platelets differential   Result Value Ref Range    WBC 9.5 4.0 - 11.0 10e9/L    RBC Count 4.14 (L) 4.4 - 5.9 10e12/L    Hemoglobin 12.0 (L) 13.3 - 17.7 g/dL    Hematocrit 38.2 (L) 40.0 - 53.0 %    MCV 92 78 - 100 fl    MCH 29.0 26.5 - 33.0 pg    MCHC 31.4 (L) 31.5 - 36.5 g/dL    RDW 14.2 10.0 - 15.0 %    Platelet Count 189 150 - 450 10e9/L    Diff Method Automated Method     % Neutrophils 45.4 %    % Lymphocytes 44.4 %    % Monocytes 7.9 %    % Eosinophils 1.7 %    % Basophils 0.3 %    % Immature Granulocytes 0.3 %    Nucleated RBCs 0 0 /100    Absolute Neutrophil 4.3 1.6 - 8.3 10e9/L    Absolute Lymphocytes 4.2 0.8 - 5.3 10e9/L    Absolute Monocytes 0.8 0.0 - 1.3 10e9/L    Absolute Eosinophils 0.2 0.0 - 0.7 10e9/L    Absolute Basophils 0.0 0.0 - 0.2 10e9/L    Abs Immature Granulocytes 0.0 0 - 0.4 10e9/L    Absolute Nucleated RBC 0.0    Comprehensive metabolic panel   Result Value Ref Range    Sodium 141 133 - 144 mmol/L    Potassium 4.4 3.4 - 5.3 mmol/L    Chloride 106 94 - 109 mmol/L    Carbon Dioxide 23 20 - 32 mmol/L    Anion Gap 12 3 - 14 mmol/L    Glucose 183 (H) 70 - 99 mg/dL    Urea Nitrogen 24 7 - 30 mg/dL    Creatinine 1.06 0.66 - 1.25 mg/dL    GFR Estimate 66 >60 mL/min/1.7m2    GFR Estimate If Black 79 >60 mL/min/1.7m2    Calcium 8.8 8.5 - 10.1 mg/dL    Bilirubin Total 0.3 0.2 - 1.3 mg/dL    Albumin 3.5 3.4 - 5.0 g/dL    Protein Total 7.0 6.8 - 8.8 g/dL    Alkaline Phosphatase 69 40 - 150 U/L    ALT 18 0 - 70 U/L    AST 20 0 - 45 U/L   Troponin I   Result Value Ref Range    Troponin I ES 0.020 0.000 - 0.045 ug/L   D dimer quantitative   Result Value Ref Range    D Dimer 0.7 (H) 0.0 - 0.50 ug/ml FEU   EKG 12 lead   Result Value Ref Range    Interpretation ECG Click View Image link to view waveform and result    Lactic acid whole blood   Result Value Ref Range     Lactic Acid 4.8 (HH) 0.7 - 2.0 mmol/L   CT Head w/o Contrast    Narrative    CT OF THE HEAD WITHOUT CONTRAST 5/26/2018 9:24 PM     COMPARISON: Brain MR 1/1/2018.    HISTORY: Dizziness tonight, on thinners, syncope.      TECHNIQUE: 5 mm thick axial CT images of the head were acquired  without IV contrast material.    FINDINGS: There is moderate diffuse cerebral volume loss. There are  subtle patchy areas of decreased density in the cerebral white matter  bilaterally that are consistent with sequela of chronic small vessel  ischemic disease. A moderate sized left frontal ischemic infarct is  again noted without change.    The ventricles and basal cisterns are within normal limits in  configuration given the degree of cerebral volume loss.  There is no  midline shift. There are no extra-axial fluid collections.    No intracranial hemorrhage, mass or recent infarct.    The visualized paranasal sinuses are well-aerated. There is no  mastoiditis. There are no fractures of the visualized bones.      Impression    IMPRESSION: Diffuse cerebral volume loss and cerebral white matter  changes consistent with chronic small vessel ischemic disease. No  evidence for acute intracranial pathology.    Radiation dose for this scan was reduced using automated exposure  control, adjustment of the mA and/or kV according to patient size, or  iterative reconstruction technique.    LISANDRO MAGALLON MD   Blood culture   Result Value Ref Range    Specimen Description Blood Left Arm     Special Requests Aerobic and anaerobic bottles received     Culture Micro No growth after 7 hours    Blood culture   Result Value Ref Range    Specimen Description Blood Right Arm     Special Requests Aerobic and anaerobic bottles received     Culture Micro No growth after 7 hours    UA reflex to Microscopic and Culture   Result Value Ref Range    Color Urine Yellow     Appearance Urine Clear     Glucose Urine 70 (A) NEG^Negative mg/dL    Bilirubin Urine Negative  NEG^Negative    Ketones Urine 5 (A) NEG^Negative mg/dL    Specific Gravity Urine 1.016 1.003 - 1.035    Blood Urine Negative NEG^Negative    pH Urine 5.5 5.0 - 7.0 pH    Protein Albumin Urine 30 (A) NEG^Negative mg/dL    Urobilinogen mg/dL Normal 0.0 - 2.0 mg/dL    Nitrite Urine Negative NEG^Negative    Leukocyte Esterase Urine Small (A) NEG^Negative    Source Catheterized Urine     RBC Urine 5 (H) 0 - 2 /HPF    WBC Urine 3 0 - 5 /HPF    Squamous Epithelial /HPF Urine 1 0 - 1 /HPF    Mucous Urine Present (A) NEG^Negative /LPF    Hyaline Casts 3 (H) 0 - 2 /LPF   Urine Culture Aerobic Bacterial   Result Value Ref Range    Specimen Description Catheterized Urine     Special Requests Specimen received in preservative     Culture Micro PENDING    Chest XR,  PA & LAT    Narrative    CHEST TWO VIEWS    5/26/2018 10:52 PM     COMPARISON: Frontal chest x-ray 8/19/2009.    HISTORY: Hypoxia.    FINDINGS: The cardiac silhouette, pulmonary vasculature, lungs and  pleural spaces are within normal limits.      Impression    IMPRESSION: Clear lungs.    LISANDRO MAGALLON MD   Lactic acid   Result Value Ref Range    Lactic Acid 3.5 (H) 0.7 - 2.0 mmol/L   Procalcitonin   Result Value Ref Range    Procalcitonin <0.05 ng/ml   Chest CT, IV contrast only - PE protocol    Narrative    CT CHEST WITH CONTRAST   5/26/2018 11:50 PM     HISTORY: Hypoxia. Dizziness.    COMPARISON: 2/8/2018 - CT abdomen and pelvis.    TECHNIQUE: Following the uneventful administration of 64 mL Isovue-370  intravenous contrast, helical sections were acquired through the lungs  according to the pulmonary embolism protocol. Coronal reconstructions  were generated. Radiation dose for this scan was reduced using  automated exposure control, adjustment of the mA and/or kV according  to the patient's size, or iterative reconstruction technique.    FINDINGS: No visualized pulmonary embolus. The thoracic aorta is  normal in caliber without dissection. Atherosclerotic  calcification in  the thoracic aorta and coronary arteries.    Minimal emphysematous changes in the lungs. A band-like opacity in the  lateral aspect of the mid left lung likely represents atelectasis or  scarring. The lungs are otherwise clear. No pleural or pericardial  effusion. No enlarged lymph nodes in the chest. Small hiatal hernia.    Scan through the upper abdomen is significant for a few nonobstructing  calculi in both kidneys, largest a 0.6 cm right renal calculus, and a  4.6 cm cyst containing a thin internal septation in the upper pole of  the left kidney.      Impression    IMPRESSION:   1. No visualized pulmonary embolus.  2. No convincing evidence of active pulmonary disease.    MEME PFEIFFER MD   Troponin POCT   Result Value Ref Range    Troponin I 0.00 0.00 - 0.10 ug/L   CT Abdomen Pelvis w/o Contrast    Narrative    CT ABDOMEN AND PELVIS WITHOUT CONTRAST   5/27/2018 2:14 AM     HISTORY: Nausea and vomiting.    COMPARISON: 2/8/2018.    TECHNIQUE: Without intravenous contrast, helical sections were  acquired from the top of the diaphragm through the pubic symphysis.  Coronal reconstructions were generated. Radiation dose for this scan  was reduced using automated exposure control, adjustment of the mA  and/or kV according to the patient's size, or iterative reconstruction  technique.    FINDINGS:     Abdomen: The liver, spleen, pancreas and adrenal glands are  unremarkable. 4.4 cm cyst containing a thin internal septation in the  upper pole of the left kidney. Excreted contrast material from a  recent CT scan present within the renal collecting systems and  ureters. The gallbladder is not visualized. Small hiatal hernia. No  enlarged lymph nodes or free fluid in the upper abdomen.  Atherosclerotic calcification in the abdominal aorta.    Scan through the lower chest is unremarkable.    Pelvis: The small and large bowel are normal in caliber. Several  colonic diverticula are present without  evidence of diverticulitis.  The appendix is unremarkable. No bowel wall thickening, pneumatosis or  free intraperitoneal gas. 1 cm diverticulum from the posterior right  aspect of the urinary bladder. No enlarged lymph nodes or free fluid  in the pelvis. Bilateral L5 pars interarticularis defects.      Impression    IMPRESSION:   1. No cause of acute pain identified in the abdomen or pelvis.  2. Colonic diverticulosis without evidence of diverticulitis.    MEME PFEIFFER MD   Glucose by meter   Result Value Ref Range    Glucose 278 (H) 70 - 99 mg/dL   Troponin I   Result Value Ref Range    Troponin I ES 0.119 (H) 0.000 - 0.045 ug/L   Comprehensive metabolic panel   Result Value Ref Range    Sodium 142 133 - 144 mmol/L    Potassium 4.8 3.4 - 5.3 mmol/L    Chloride 112 (H) 94 - 109 mmol/L    Carbon Dioxide 25 20 - 32 mmol/L    Anion Gap 5 3 - 14 mmol/L    Glucose 195 (H) 70 - 99 mg/dL    Urea Nitrogen 20 7 - 30 mg/dL    Creatinine 0.84 0.66 - 1.25 mg/dL    GFR Estimate 85 >60 mL/min/1.7m2    GFR Estimate If Black >90 >60 mL/min/1.7m2    Calcium 8.0 (L) 8.5 - 10.1 mg/dL    Bilirubin Total 0.3 0.2 - 1.3 mg/dL    Albumin 2.9 (L) 3.4 - 5.0 g/dL    Protein Total 5.8 (L) 6.8 - 8.8 g/dL    Alkaline Phosphatase 60 40 - 150 U/L    ALT 15 0 - 70 U/L    AST 18 0 - 45 U/L   CBC with platelets differential   Result Value Ref Range    WBC 7.6 4.0 - 11.0 10e9/L    RBC Count 3.65 (L) 4.4 - 5.9 10e12/L    Hemoglobin 10.7 (L) 13.3 - 17.7 g/dL    Hematocrit 33.5 (L) 40.0 - 53.0 %    MCV 92 78 - 100 fl    MCH 29.3 26.5 - 33.0 pg    MCHC 31.9 31.5 - 36.5 g/dL    RDW 14.3 10.0 - 15.0 %    Platelet Count 144 (L) 150 - 450 10e9/L    Diff Method Automated Method     % Neutrophils 54.9 %    % Lymphocytes 33.3 %    % Monocytes 10.5 %    % Eosinophils 0.9 %    % Basophils 0.1 %    % Immature Granulocytes 0.3 %    Nucleated RBCs 0 0 /100    Absolute Neutrophil 4.2 1.6 - 8.3 10e9/L    Absolute Lymphocytes 2.5 0.8 - 5.3 10e9/L    Absolute  Monocytes 0.8 0.0 - 1.3 10e9/L    Absolute Eosinophils 0.1 0.0 - 0.7 10e9/L    Absolute Basophils 0.0 0.0 - 0.2 10e9/L    Abs Immature Granulocytes 0.0 0 - 0.4 10e9/L    Absolute Nucleated RBC 0.0    Lactic acid whole blood   Result Value Ref Range    Lactic Acid 2.1 (H) 0.7 - 2.0 mmol/L   Hemoglobin A1c   Result Value Ref Range    Hemoglobin A1C 9.1 (H) 0 - 5.6 %   Troponin I   Result Value Ref Range    Troponin I ES 0.898 (HH) 0.000 - 0.045 ug/L   Glucose by meter   Result Value Ref Range    Glucose 180 (H) 70 - 99 mg/dL   Troponin I   Result Value Ref Range    Troponin I ES 2.083 (HH) 0.000 - 0.045 ug/L       Discussed w Dr Rodriguez

## 2018-05-28 LAB
ANION GAP SERPL CALCULATED.3IONS-SCNC: 6 MMOL/L (ref 3–14)
BACTERIA SPEC CULT: NO GROWTH
BUN SERPL-MCNC: 11 MG/DL (ref 7–30)
CALCIUM SERPL-MCNC: 8.4 MG/DL (ref 8.5–10.1)
CHLORIDE SERPL-SCNC: 108 MMOL/L (ref 94–109)
CO2 SERPL-SCNC: 27 MMOL/L (ref 20–32)
CREAT SERPL-MCNC: 0.84 MG/DL (ref 0.66–1.25)
ERYTHROCYTE [DISTWIDTH] IN BLOOD BY AUTOMATED COUNT: 14.5 % (ref 10–15)
GFR SERPL CREATININE-BSD FRML MDRD: 86 ML/MIN/1.7M2
GLUCOSE BLDC GLUCOMTR-MCNC: 147 MG/DL (ref 70–99)
GLUCOSE BLDC GLUCOMTR-MCNC: 161 MG/DL (ref 70–99)
GLUCOSE BLDC GLUCOMTR-MCNC: 195 MG/DL (ref 70–99)
GLUCOSE BLDC GLUCOMTR-MCNC: 221 MG/DL (ref 70–99)
GLUCOSE SERPL-MCNC: 188 MG/DL (ref 70–99)
HCT VFR BLD AUTO: 33.9 % (ref 40–53)
HGB BLD-MCNC: 10.5 G/DL (ref 13.3–17.7)
INTERPRETATION ECG - MUSE: NORMAL
LACTATE BLD-SCNC: 1.3 MMOL/L (ref 0.7–2)
LMWH PPP CHRO-ACNC: 0.39 IU/ML
Lab: NORMAL
MAGNESIUM SERPL-MCNC: 1.9 MG/DL (ref 1.6–2.3)
MCH RBC QN AUTO: 28.7 PG (ref 26.5–33)
MCHC RBC AUTO-ENTMCNC: 31 G/DL (ref 31.5–36.5)
MCV RBC AUTO: 93 FL (ref 78–100)
PLATELET # BLD AUTO: 145 10E9/L (ref 150–450)
POTASSIUM SERPL-SCNC: 4.9 MMOL/L (ref 3.4–5.3)
RBC # BLD AUTO: 3.66 10E12/L (ref 4.4–5.9)
SODIUM SERPL-SCNC: 141 MMOL/L (ref 133–144)
SPECIMEN SOURCE: NORMAL
TROPONIN I SERPL-MCNC: 0.54 UG/L (ref 0–0.04)
TROPONIN I SERPL-MCNC: 1.84 UG/L (ref 0–0.04)
WBC # BLD AUTO: 6.5 10E9/L (ref 4–11)

## 2018-05-28 PROCEDURE — A9270 NON-COVERED ITEM OR SERVICE: HCPCS | Mod: GY | Performed by: INTERNAL MEDICINE

## 2018-05-28 PROCEDURE — 84484 ASSAY OF TROPONIN QUANT: CPT | Performed by: HOSPITALIST

## 2018-05-28 PROCEDURE — 25000132 ZZH RX MED GY IP 250 OP 250 PS 637: Mod: GY | Performed by: HOSPITALIST

## 2018-05-28 PROCEDURE — A9270 NON-COVERED ITEM OR SERVICE: HCPCS | Mod: GY | Performed by: HOSPITALIST

## 2018-05-28 PROCEDURE — 99232 SBSQ HOSP IP/OBS MODERATE 35: CPT | Performed by: PHYSICIAN ASSISTANT

## 2018-05-28 PROCEDURE — 21000001 ZZH R&B HEART CARE

## 2018-05-28 PROCEDURE — 83605 ASSAY OF LACTIC ACID: CPT | Performed by: INTERNAL MEDICINE

## 2018-05-28 PROCEDURE — 36415 COLL VENOUS BLD VENIPUNCTURE: CPT | Performed by: HOSPITALIST

## 2018-05-28 PROCEDURE — 85520 HEPARIN ASSAY: CPT | Performed by: INTERNAL MEDICINE

## 2018-05-28 PROCEDURE — 84484 ASSAY OF TROPONIN QUANT: CPT | Performed by: INTERNAL MEDICINE

## 2018-05-28 PROCEDURE — 00000146 ZZHCL STATISTIC GLUCOSE BY METER IP

## 2018-05-28 PROCEDURE — 36415 COLL VENOUS BLD VENIPUNCTURE: CPT | Performed by: INTERNAL MEDICINE

## 2018-05-28 PROCEDURE — 25000132 ZZH RX MED GY IP 250 OP 250 PS 637: Mod: GY | Performed by: INTERNAL MEDICINE

## 2018-05-28 PROCEDURE — 80048 BASIC METABOLIC PNL TOTAL CA: CPT | Performed by: INTERNAL MEDICINE

## 2018-05-28 PROCEDURE — 83735 ASSAY OF MAGNESIUM: CPT | Performed by: INTERNAL MEDICINE

## 2018-05-28 PROCEDURE — 99232 SBSQ HOSP IP/OBS MODERATE 35: CPT | Performed by: INTERNAL MEDICINE

## 2018-05-28 PROCEDURE — 85027 COMPLETE CBC AUTOMATED: CPT | Performed by: INTERNAL MEDICINE

## 2018-05-28 PROCEDURE — 93010 ELECTROCARDIOGRAM REPORT: CPT | Mod: 76 | Performed by: INTERNAL MEDICINE

## 2018-05-28 PROCEDURE — 25000128 H RX IP 250 OP 636: Performed by: HOSPITALIST

## 2018-05-28 PROCEDURE — 93005 ELECTROCARDIOGRAM TRACING: CPT

## 2018-05-28 RX ORDER — ASPIRIN 81 MG/1
324 TABLET, CHEWABLE ORAL DAILY
Status: DISCONTINUED | OUTPATIENT
Start: 2018-05-29 | End: 2018-05-30

## 2018-05-28 RX ORDER — ASPIRIN 81 MG/1
243 TABLET, CHEWABLE ORAL DAILY
Status: COMPLETED | OUTPATIENT
Start: 2018-05-28 | End: 2018-05-28

## 2018-05-28 RX ORDER — MAGNESIUM OXIDE 400 MG/1
400 TABLET ORAL DAILY
Status: DISCONTINUED | OUTPATIENT
Start: 2018-05-28 | End: 2018-05-31 | Stop reason: HOSPADM

## 2018-05-28 RX ADMIN — ASPIRIN 81 MG 81 MG: 81 TABLET ORAL at 09:21

## 2018-05-28 RX ADMIN — HEPARIN SODIUM 800 UNITS/HR: 10000 INJECTION, SOLUTION INTRAVENOUS at 05:03

## 2018-05-28 RX ADMIN — PIPERACILLIN SODIUM,TAZOBACTAM SODIUM 3.38 G: 3; .375 INJECTION, POWDER, FOR SOLUTION INTRAVENOUS at 01:36

## 2018-05-28 RX ADMIN — METOPROLOL TARTRATE 25 MG: 25 TABLET ORAL at 21:56

## 2018-05-28 RX ADMIN — NITROGLYCERIN 0.4 MG: 0.4 TABLET SUBLINGUAL at 22:27

## 2018-05-28 RX ADMIN — METOPROLOL TARTRATE 25 MG: 25 TABLET ORAL at 09:21

## 2018-05-28 RX ADMIN — NITROGLYCERIN 0.4 MG: 0.4 TABLET SUBLINGUAL at 22:13

## 2018-05-28 RX ADMIN — ASPIRIN 81 MG 243 MG: 81 TABLET ORAL at 23:57

## 2018-05-28 RX ADMIN — PIPERACILLIN SODIUM,TAZOBACTAM SODIUM 3.38 G: 3; .375 INJECTION, POWDER, FOR SOLUTION INTRAVENOUS at 09:21

## 2018-05-28 RX ADMIN — Medication 400 MG: at 12:05

## 2018-05-28 ASSESSMENT — VISUAL ACUITY: OU: NORMAL ACUITY

## 2018-05-28 NOTE — PLAN OF CARE
Problem: Patient Care Overview  Goal: Plan of Care/Patient Progress Review  Outcome: Improving  Pt. A&O. VSS. Tele went from NSR with occasional PVC to frequent PVCs 2 PVC to 1 beat. Cardio Paged x 2 and overhead paged. Placed O2. Return call pending. Pt. Nonsymptomatic. Pt. Has denied any dizziness, nausea, chest pain, numbness or tingling. Heparin gtt infusing and Continue to trend troponin per hospitalist. CMS intact. Possible  Diet advanced. BP systolic was in the 90s upon arrival to the unit, 250cc bolus of fluids given with improvement. Antibiotics. Mild right field cut with neglect noted, otherwise Neuros intact. Tolerated advanced diet.

## 2018-05-28 NOTE — PROGRESS NOTES
Cardiology returned call. STAT BMP/Mg, Give 2mg IV Mg now, STAT EKG, STAT Metoprolol 25mg PO, and 25 mg BID scheduled. NOC nurse attempting 2nd IV access for Mg.

## 2018-05-28 NOTE — PROGRESS NOTES
[facility]    Cardiology Progress Note     Assessment & Plan      Dustin Pearce is a 89 year old male who was admitted on 5/26/2018. I was asked to see the patient for troponin elevation in setting of sepsis, chronic systolic CHF, moderate AS, diabetes mellitus, s/p lower extremity angioplasty, chronic paroxysmal afib, GERD, hypertension, dyslipidemia admitted for CP, hypotension, hypoxia, lactic acidosis being managed for sepsis. We are consulted regarding troponin elevation.      Given absence of symptoms, it is likely that trop elevation represents demand ischemia. ECG does show new ST depressions in lateral leads. Discussed possibility of posterior STEMI but given similar echo findings and continued asympomatic nature, on top of ongoing active sepsis and his desire for more conservative management recommend treatment of underlying sepsis.       CTPE negative for PE. HE is beig managed with fluid resuscitation, antibiotics for sepsis secodnary to viral gastroenteritis vs. UTI given history of E. Faecalis UTI 2/2018.     Given absence of white count it is possible his initial presentation may have been an ischemic event. Echo does not look significantly different. Long discussion with wife and patient. Both agreed to forgo any invasive measures at this time.      1) Troponin elevation  History of remote MI, TTE 1/2018 showed EF 35-40%, moderate AS, ild aortic root dilatation with Nuclear Stress which showed inferior wall ischemia, reversible.     ECG showed some ST depressions in the lateral leads and PVCs. Repeat echo showed EF 35-40% with inferior and inferolateral wall hypokinesis consistent with previous nuclear study and echo. Wall motion abnormalities are similar to echo 12/2017.      Patient and wife want conservative management. For these reasons we will hold off on further invasive therapies and attribute troponin elevation to demand ischemia. Later angiogram is reasonable if the patient  is symptomatic, however he says he remains asymptomatic without any chest pain or SOB w exertion. Will sign off.             -continue ASA  -continue Plavix  -continue statin  -continue toprol, lisinopril pending improvement in BP  -echocardiogram to evaluate new RMWA or new valvular abnormalities did not show any new findings        2) Sepsis  -cpntinue current management  - likely contributing to demand ischemia picture  -unusual that no leukocytosis persent     3)   Anemia     -monitor in setting of demand ischemia  -currently 10.7, and this is appropriate.             Lucie Soto MD    Interval History   Multiple PVCs yesterday. Magnesium replaced.     Physical Exam   Temp: 98  F (36.7  C) Temp src: Oral BP: 125/47 Pulse: 60 Heart Rate: 70 Resp: 16 SpO2: 97 % O2 Device: Nasal cannula Oxygen Delivery: 2 LPM  Vitals:    05/27/18 0319 05/28/18 0100   Weight: 75.3 kg (166 lb) 76 kg (167 lb 8 oz)     Vital Signs with Ranges  Temp:  [97.6  F (36.4  C)-98  F (36.7  C)] 98  F (36.7  C)  Pulse:  [53-81] 60  Heart Rate:  [67-82] 70  Resp:  [16] 16  BP: ()/(47-78) 125/47  SpO2:  [92 %-97 %] 97 %  I/O last 3 completed shifts:  In: 367 [P.O.:120; I.V.:247]  Out: 200 [Urine:200]  Patient Active Problem List   Diagnosis     Coronary artery disease involving native coronary artery of native heart without angina pectoris     Mild cognitive impairment     Hyperlipidemia, unspecified hyperlipidemia type     Benign non-nodular prostatic hyperplasia with lower urinary tract symptoms     Gastroesophageal reflux disease without esophagitis     Cerebrovascular accident (H)     Carotid artery stenosis     Abdominal aortic aneurysm (H)     Lumbar back pain     Benign essential hypertension     TIA (transient ischemic attack)     Paroxysmal atrial fibrillation (H)     Ischemic cardiomyopathy     Aortic root dilatation (H)     Physical deconditioning     Diabetic ulcer of left foot associated with diabetes mellitus due to  underlying condition (H)     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)     Anemia due to blood loss, acute     Diarrhea, unspecified type     Nausea and vomiting, intractability of vomiting not specified, unspecified vomiting type     Acute pain of left shoulder     Balance problems     Generalized muscle weakness     Peripheral artery disease (H)     Aortic stenosis     RBBB with left anterior fascicular block     Stroke of unknown etiology (H)     Carotid stenosis     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)     History of TIA (transient ischemic attack) and stroke     UTI (urinary tract infection)     UTI (urinary tract infection) due to Enterococcus     Generalized weakness     Type 2 diabetes mellitus with hyperglycemia, unspecified long term insulin use status (H)     Atherosclerosis of coronary artery of native heart without angina pectoris, unspecified vessel or lesion type     Depression, unspecified depression type     Slow transit constipation     Severe sepsis (H)         Gen: no distress  Heart: RRR  Lungs: clear  Abdomen: soft, nontender  Extremities: no edema    Medications     HEParin 650 Units/hr (05/28/18 0818)       aspirin  81 mg Oral Daily     insulin aspart  1-7 Units Subcutaneous TID AC     insulin aspart  1-5 Units Subcutaneous At Bedtime     metoprolol tartrate  25 mg Oral BID     piperacillin-tazobactam  3.375 g Intravenous Q6H     sodium chloride (PF)  3 mL Intracatheter Q8H       Data   Results for orders placed or performed during the hospital encounter of 05/26/18 (from the past 24 hour(s))   Glucose by meter   Result Value Ref Range    Glucose 180 (H) 70 - 99 mg/dL   Troponin I   Result Value Ref Range    Troponin I ES 2.083 (HH) 0.000 - 0.045 ug/L   ECHO LIMITED WITH OPTISON    Narrative    [id number]  [id number]  [id number]  [id number]^SHMUEL^CHARISSE^LARISSA           [facility]  Echocardiography Laboratory  [address] [place] [address]  [place] [id number]        Name: SID AGUIAR  MRN: [id number]  : 1928  Study Date: 2018 12:50 PM  Age: 89 yrs  Gender: Male  Patient Location: [facility]  Reason For Study: CHF  Ordering Physician: CHARISSE MI  Referring Physician: CHARISSE MI  Performed By: [facility] Deidra Hart     BSA: 1.8 m2  Height: 66 in  Weight: 166 lb  HR: 80  BP: 124/55 mmHg  _____________________________________________________________________________  __        Procedure  Complete Portable Echo Adult. Contrast Optison.  _____________________________________________________________________________  __        Interpretation Summary     The visual ejection fraction is estimated at 35-40%.  There is moderate to severe inferolateral wall hypokinesis.  There is moderate inferior wall hypokinesis.  The right ventricle is normal in size and function.  The left atrium is mildly dilated.  There is mild mitral stenosis.  Mild to moderate valvular aortic stenosis.  Mild aortic root dilatation.  _____________________________________________________________________________  __        Left Ventricle  The left ventricle is borderline dilated. Proximal septal thickening is noted.  The visual ejection fraction is estimated at 35-40%. Diastolic function not  assessed due to significant mitral annular calcification. There is moderate to  severe inferolateral wall hypokinesis. There is moderate inferior wall  hypokinesis.     Right Ventricle  The right ventricle is normal in size and function.     Atria  The left atrium is mildly dilated. Right atrial size is normal. There is no  atrial shunt seen.     Mitral Valve  There is moderate mitral annular calcification. The mitral valve leaflets are  moderately thickened. There is trace mitral regurgitation. The mean mitral  valve gradient is 4.4 mmHg. There is mild mitral stenosis.        Tricuspid Valve  The tricuspid valve is not well visualized, but is grossly normal.     Aortic Valve  The aortic valve is  not well visualized. No aortic regurgitation is present.  The mean AoV pressure gradient is 12.8 mmHg. Mild to moderate valvular aortic  stenosis.     Pulmonic Valve  The pulmonic valve is not well seen, but is grossly normal.     Vessels  Mild aortic root dilatation. Normal size ascending aorta. The inferior vena  cava is not dilated.     Pericardium  There is no pericardial effusion.        Rhythm  The rhythm was sinus bradycardia.  _____________________________________________________________________________  __  MMode/2D Measurements & Calculations  IVSd: 1.3 cm     LVIDd: 5.7 cm  LVIDs: 4.9 cm  LVPWd: 1.1 cm  FS: 13.0 %  LV mass(C)d: 279.4 grams  LV mass(C)dI: 151.2 grams/m2  Ao root diam: 4.2 cm  LA dimension: 4.0 cm  asc Aorta Diam: 3.4 cm  LA/Ao: 0.97  LVOT diam: 2.2 cm  LVOT area: 4.0 cm2     EF(MOD-bp): 53.1 %  LA Volume (BP): 55.8 ml  LA Volume Index (BP): 30.2 ml/m2  RWT: 0.37           Doppler Measurements & Calculations  MV E max donnie: 85.7 cm/sec  MV A max donnie: 159.8 cm/sec  MV E/A: 0.54  MV max P.7 mmHg  MV mean P.4 mmHg  MV V2 VTI: 49.3 cm  MVA(VTI): 1.7 cm2  MV dec time: 0.29 sec  Ao V2 max: 259.5 cm/sec  Ao max P.9 mmHg  Ao V2 mean: 172.2 cm/sec  Ao mean P.8 mmHg  Ao V2 VTI: 55.2 cm  YULIYA(I,D): 1.6 cm2  YULIYA(V,D): 1.3 cm2  LV V1 max PG: 3.1 mmHg  LV V1 max: 87.4 cm/sec  LV V1 VTI: 21.6 cm  SV(LVOT): 85.6 ml  SI(LVOT): 46.3 ml/m2  Pulm Sys Donnie: 82.0 cm/sec  Pulm Delgado Donnie: 44.3 cm/sec  Pulm A Revs Donnie: 39.4 cm/sec  Pulm S/D: 1.9  AV Donnie Ratio (DI): 0.34  YULIYA Index (cm2/m2): 0.84  E/E' av.4  Lateral E/e': 12.4  Medial E/e': 20.4              _____________________________________________________________________________  __        Report approved by: Davis Mullen 2018 02:13 PM      EKG 12-lead, tracing only   Result Value Ref Range    Interpretation ECG Click View Image link to view waveform and result    Troponin I (STAT q4h x3)   Result Value Ref Range    Troponin I ES  2.330 (HH) 0.000 - 0.045 ug/L   Basic metabolic panel   Result Value Ref Range    Sodium 144 133 - 144 mmol/L    Potassium 4.3 3.4 - 5.3 mmol/L    Chloride 112 (H) 94 - 109 mmol/L    Carbon Dioxide 29 20 - 32 mmol/L    Anion Gap 3 3 - 14 mmol/L    Glucose 163 (H) 70 - 99 mg/dL    Urea Nitrogen 13 7 - 30 mg/dL    Creatinine 0.80 0.66 - 1.25 mg/dL    GFR Estimate >90 >60 mL/min/1.7m2    GFR Estimate If Black >90 >60 mL/min/1.7m2    Calcium 8.0 (L) 8.5 - 10.1 mg/dL   Magnesium   Result Value Ref Range    Magnesium 1.5 (L) 1.6 - 2.3 mg/dL   Glucose by meter   Result Value Ref Range    Glucose 164 (H) 70 - 99 mg/dL   EKG 12-lead, complete   Result Value Ref Range    Interpretation ECG Click View Image link to view waveform and result    Troponin I (STAT q4h x3)   Result Value Ref Range    Troponin I ES 1.656 (HH) 0.000 - 0.045 ug/L   Heparin Xa level (AM Draw)   Result Value Ref Range    Heparin 10A Level 0.35 IU/mL   Glucose by meter   Result Value Ref Range    Glucose 195 (H) 70 - 99 mg/dL   Troponin I (STAT q4h x3)   Result Value Ref Range    Troponin I ES 1.839 (HH) 0.000 - 0.045 ug/L   Basic metabolic panel   Result Value Ref Range    Sodium 141 133 - 144 mmol/L    Potassium 4.9 3.4 - 5.3 mmol/L    Chloride 108 94 - 109 mmol/L    Carbon Dioxide 27 20 - 32 mmol/L    Anion Gap 6 3 - 14 mmol/L    Glucose 188 (H) 70 - 99 mg/dL    Urea Nitrogen 11 7 - 30 mg/dL    Creatinine 0.84 0.66 - 1.25 mg/dL    GFR Estimate 86 >60 mL/min/1.7m2    GFR Estimate If Black >90 >60 mL/min/1.7m2    Calcium 8.4 (L) 8.5 - 10.1 mg/dL   CBC with platelets   Result Value Ref Range    WBC 6.5 4.0 - 11.0 10e9/L    RBC Count 3.66 (L) 4.4 - 5.9 10e12/L    Hemoglobin 10.5 (L) 13.3 - 17.7 g/dL    Hematocrit 33.9 (L) 40.0 - 53.0 %    MCV 93 78 - 100 fl    MCH 28.7 26.5 - 33.0 pg    MCHC 31.0 (L) 31.5 - 36.5 g/dL    RDW 14.5 10.0 - 15.0 %    Platelet Count 145 (L) 150 - 450 10e9/L   Lactic acid whole blood   Result Value Ref Range    Lactic Acid 1.3  0.7 - 2.0 mmol/L   Heparin Xa level (AM Draw)   Result Value Ref Range    Heparin 10A Level 0.39 IU/mL   Magnesium   Result Value Ref Range    Magnesium 1.9 1.6 - 2.3 mg/dL   Glucose by meter   Result Value Ref Range    Glucose 161 (H) 70 - 99 mg/dL   EKG 12-lead, tracing only   Result Value Ref Range    Interpretation ECG Click View Image link to view waveform and result        Discussed with Dr. Rodriguez and Rafael

## 2018-05-28 NOTE — DISCHARGE INSTRUCTIONS
If you have recurrence of symptoms, chest pressure, or increased SOB seek medical attention.     Follow up with Cardiology within one week of discharge from hospital.

## 2018-05-28 NOTE — PROGRESS NOTES
Patient would like to discharge home. Does not want invasive therapies for possible ishchemia. This is reasonable. Should continue on current medications. Mg Oxide restarted.

## 2018-05-28 NOTE — PLAN OF CARE
Problem: Patient Care Overview  Goal: Plan of Care/Patient Progress Review  12 hour RN.  Transfers A1.  A/Ox4, but is forgetful.  Pt does have mild right field cut.  All other neuro s intact.  Denies nausea.  VSS.  Tele SB BBB.  Denies pain. Trop trending down. Pt continues on Heparin gtt.  LS dim.  Denies SOB.  Pt on 2L of o2.  Trace edema to radu LE.  Pt continues on Zosyn for possible sepsis.  Pt voiding w/o difficulty.  Cardiology following.

## 2018-05-28 NOTE — PLAN OF CARE
Problem: Patient Care Overview  Goal: Plan of Care/Patient Progress Review  Outcome: Improving  Pt. A&O. Left visual field cut otherwise neuros intact. Ax-1/SBA. CMS intact. Denies dizziness, chest pain, SOB, nausea. Heparin gtt stopped. Tele NSR with BBB and occasional PVCs. Continue to monitor today with discharge home tomorrow if remains stable.

## 2018-05-28 NOTE — PROGRESS NOTES
I have reviewed Mr. Pearce's case which I discussed with Dr. Rodriguez this afternoon. He has been having intermittent PVCs despite relatively normal electrolytes. Overnight will plan on     1) repeat ecg  2) Mg 2 mg IV x 1   3) BMP panel  4) Metoprolol 25 mg po BID, starting now.     ST depressions on initial ECG are concerning. Echo did not show many significant changes, but this may represent demand vs a new event. If this is a new event my main concern would be for posterior ischemia given ST depressions in lateral leads although with high PVC burden this is hard to interpret. He remains asymptomatic. Not interested in aggressive measures. Will continue to medically manage.       Magnesium came back at 1.5. We are repleting with 4 mg IV stat. Metoprolol has been reinitated as well, initially held in setting of sepsis.     This likely represents demand ischemia in setting of infection especially given later timing of troponin rise. However this could be a new infarction secondary to ongoign inflammation during septic episode, or an RV Infarction that presented with nausea and hypotension. We will cotinue to maintain a broad differential and pursue urgent cardiac catheterization sooner in case he develops signs and symptoms of STEMI or worsening symptomatic ischemia. He remains asymptomatic with no chest pain for the last 24 hours.      ADDENDUM    Repeat discussion with patient reveals he would be willing to consider angiography. Will consider this for Tuesday unless develops symptoms or ECG suggestive of STEMI.

## 2018-05-28 NOTE — PROGRESS NOTES
SPIRITUAL HEALTH SERVICES Progress Note  [facility]    Met with pt, per request in chart.  Pt's friend Halina was at his bedside.  She did most of the talking, sharing details about what brought pt into the hospital on Saturday night, as well as noting that pt has had many recent health challenges.    Pt is Rastafari and would like to see Fr. Dent, with whom he is well acquainted.    Provided listening and support, and will leave a request for Fr. Dent.  SH team is available if additional needs arise.                                                                                                                                                 Tana Schuster M.A.  Staff   Pager [phone]  Phone [phone]

## 2018-05-28 NOTE — PROGRESS NOTES
[facility]    Hospitalist Progress Note    Date of Service (when I saw the patient): 05/28/2018    Assessment & Plan   Dustni Pearce is a markedly pleasant 89 year old gentleman with extensive past medical history most notable for CAD, chronic systolic CHF, Diabetes mellitus, moderate aortic stenosis, PAD status post lower extremity angioplasties, mild left carotid artery stenosis, chronic paroxysmal afib, GERD, hypertension and dyslipidemia who presents with dizziness, nausea and vomiting and chest pain and is found to have hypotension, lactic acidosis, and hypoxia, concerning for severe sepsis of unclear etiology.      Severe sepsis suspected, resolving: In the ED Mr. Pearce was very ill appearing, actively vomiting. Initial BP 89/50, improved to 140/71 with IV fluid bolus. Hypoxic to upper 80 percent range on room air. He is afebrile and pulse 50-60's. CBC showed WBC 9.5, HGB 12, . Lactate is 4.8, improved to 1.3 with IV fluid. CMP unremarkable. D-Dimer mildly elevated. CT PA negative for acute pathology as is CXR. EKG shows NSR with ST segment depressions in V3-4.  - Although the first two Troponins are negative, as is Procalcitonin, the third troponin was abn at 0.119  - UA is relatively bland (3 WBC). Blood cultures were sent and he was given anti-emetics as well as Vancomycin and Zosyn.    Overall he appears to have severe sepsis of unclear cause. He could have viral gastroenteritis. He was hospitalized here 2/2018 for very similar presentation and found to have E faecalis UTI does not seem to have a urinary tract infection at this time. He appears to have demand ischemia. His wife initially was concerned for stroke but he has no focal neurologic deficits at  time of admission making stroke less likely at present as well. His right leg drag could have been a TIA exacerbated by hypertension (note being made of hospitalization for TIA in 12/2017). Last, review of notes in [facility] suggests  that he has chronic orthostatic hypotension.  -- IV fluids have been discontinued  -- Echocardiogram did not show any new regional wall motion abnormalities or valvular abnormalities  -- Cardiology has been consulted, input appreciated.  -- Zosyn continued for now; blood cultures no growth to date, urine culture negative.  -- CT abdomen and pelvis - No cause of acute pain identified in the abdomen or pelvis. Colonic diverticulosis without evidence of diverticulitis  -- Q 4 neuro checks; for any concerning changes would call a code stroke (again, noting a non-focal exam in the ED)  -- Advance diet as tolerated      CAD, chronic systolic CHF  Troponin elevation: He has a history of remote MI. He is followed by [facility]. Most recent TTE 1/2018 showed EF 35-40% with multiple WMA's, and moderate AS and mild aortic root dilation. Recent Nuclear Stress test in 2017 reportedly showed persistent reversible inferior wall ischemia.   -- Troponin 2.083-->2.330-->1.656-->1.839  -- Continue aspirin   -- Echo with EF of 35-40%.  Mod - severe inferlateral wall hypokinesis, mod inf wall hypokinesis  -- Echo from 1/1/18 had EF of 35-40%, severe hypokinesis to akinesis of entire inf and mid to basal inferolateral wall.  -- Resume aspirin, Plavix, Toprol, Lisinopril, and statin   -- Patient and wife on conservative management.  For these reasons we will hold off on further invasive therapies, and attribute troponin elevation to demand ischemia.  -- Later angiogram is reasonable if the patient is symptomatic, however he says he remains asymptomatic without any chest pain or SOB w exertion. Cardiology has signed off.      Prior TIA: Plavix was added to aspirin in 12/2017.       Type 2 Diabetes mellitus:  A1c is 9.1.  - Hold Metformin and Glipizide  - Use ISS insulin while hospitalized.      Anemia: Mild, monitor      Chronic depression, GERD, BPH: Resume Cymbalta, PPI, Flomax upon discharge.     # Pain Assessment:  Current Pain  Score 5/27/2018   Patient currently in pain? denies   Pain score (0-10) -   Pain location -   Pain descriptors -   Dustin s pain level was assessed and he currently denies pain.       DVT Prophylaxis: SCD  Code Status: Full Code     Disposition: Expected discharge 5/20 after one more day of monitoring and IV antibiotics.    Brandyn Matt Graves    Interval History   Patient reports feeling well.  He is a little fatigued.  Denies any fever, chills, chest pain, shortness of breath, abdominal pain.  No more nausea or vomiting.  He is tolerating a diet.  Wife present at bedside and updated.    -Data reviewed today: I reviewed all new labs and imaging results over the last 24 hours.     Physical Exam   Temp: 98  F (36.7  C) Temp src: Oral BP: 154/67 Pulse: 60 Heart Rate: 82 Resp: 16 SpO2: 97 % O2 Device: Nasal cannula Oxygen Delivery: 2 LPM  Vitals:    05/27/18 0319 05/28/18 0100   Weight: 75.3 kg (166 lb) 76 kg (167 lb 8 oz)     Vital Signs with Ranges  Temp:  [97.6  F (36.4  C)-98  F (36.7  C)] 98  F (36.7  C)  Pulse:  [53-81] 60  Heart Rate:  [67-82] 82  Resp:  [16] 16  BP: ()/(47-78) 154/67  SpO2:  [92 %-97 %] 97 %  I/O last 3 completed shifts:  In: 367 [P.O.:120; I.V.:247]  Out: 200 [Urine:200]    Constitutional: Alert, oriented to person, place, situation.  Cooperative, lying in bed in NAD.   Respiratory:  Lungs CTAB.  No crackles, wheezes, or rhonchi, no labored breathing.  Cardiovascular:  Heart RRR, no MRG, no edema.  GI:  Abdomen soft, NT/ND and with normoactive BS  Skin/Integumen:  Warm, dry, non-diaphoretic.  MSK: CMS x4 intact.    Medications     HEParin 800 Units/hr (05/28/18 0503)       aspirin  81 mg Oral Daily     insulin aspart  1-7 Units Subcutaneous TID AC     insulin aspart  1-5 Units Subcutaneous At Bedtime     metoprolol tartrate  25 mg Oral BID     piperacillin-tazobactam  3.375 g Intravenous Q6H     sodium chloride (PF)  3 mL Intracatheter Q8H       Data     Recent Labs  Lab 05/28/18  [id number]  05/28/18 0155 05/27/18 2025 05/27/18 1814 05/27/18  0710  05/27/18  0119 05/26/18 2035   WBC 6.5  --   --   --   --  7.6  --   --  9.5   HGB 10.5*  --   --   --   --  10.7*  --   --  12.0*   MCV 93  --   --   --   --  92  --   --  92   *  --   --   --   --  144*  --   --  189     --   --  144  --  142  --   --  141   POTASSIUM 4.9  --   --  4.3  --  4.8  --   --  4.4   CHLORIDE 108  --   --  112*  --  112*  --   --  106   CO2 27  --   --  29  --  25  --   --  23   BUN 11  --   --  13  --  20  --   --  24   CR 0.84  --   --  0.80  --  0.84  --   --  1.06   ANIONGAP 6  --   --  3  --  5  --   --  12   ALLISON 8.4*  --   --  8.0*  --  8.0*  --   --  8.8   *  --   --  163*  --  195*  --   --  183*   ALBUMIN  --   --   --   --   --  2.9*  --   --  3.5   PROTTOTAL  --   --   --   --   --  5.8*  --   --  7.0   BILITOTAL  --   --   --   --   --  0.3  --   --  0.3   ALKPHOS  --   --   --   --   --  60  --   --  69   ALT  --   --   --   --   --  15  --   --  18   AST  --   --   --   --   --  18  --   --  20   TROPI  --  1.839* 1.656* 2.330*  < > 0.898*  < >  --  0.020   TROPONIN  --   --   --   --   --   --   --  0.00  --    < > = values in this interval not displayed.    No results found for this or any previous visit (from the past 24 hour(s)).

## 2018-05-29 LAB
ANION GAP SERPL CALCULATED.3IONS-SCNC: 6 MMOL/L (ref 3–14)
BUN SERPL-MCNC: 8 MG/DL (ref 7–30)
CALCIUM SERPL-MCNC: 8.8 MG/DL (ref 8.5–10.1)
CHLORIDE SERPL-SCNC: 106 MMOL/L (ref 94–109)
CO2 SERPL-SCNC: 28 MMOL/L (ref 20–32)
CREAT SERPL-MCNC: 0.82 MG/DL (ref 0.66–1.25)
ERYTHROCYTE [DISTWIDTH] IN BLOOD BY AUTOMATED COUNT: 14.5 % (ref 10–15)
GFR SERPL CREATININE-BSD FRML MDRD: 88 ML/MIN/1.7M2
GLUCOSE BLDC GLUCOMTR-MCNC: 144 MG/DL (ref 70–99)
GLUCOSE BLDC GLUCOMTR-MCNC: 172 MG/DL (ref 70–99)
GLUCOSE BLDC GLUCOMTR-MCNC: 180 MG/DL (ref 70–99)
GLUCOSE BLDC GLUCOMTR-MCNC: 216 MG/DL (ref 70–99)
GLUCOSE BLDC GLUCOMTR-MCNC: 218 MG/DL (ref 70–99)
GLUCOSE SERPL-MCNC: 187 MG/DL (ref 70–99)
HCT VFR BLD AUTO: 35.3 % (ref 40–53)
HGB BLD-MCNC: 11.1 G/DL (ref 13.3–17.7)
LMWH PPP CHRO-ACNC: 0.11 IU/ML
LMWH PPP CHRO-ACNC: 0.22 IU/ML
MCH RBC QN AUTO: 28.9 PG (ref 26.5–33)
MCHC RBC AUTO-ENTMCNC: 31.4 G/DL (ref 31.5–36.5)
MCV RBC AUTO: 92 FL (ref 78–100)
PLATELET # BLD AUTO: 165 10E9/L (ref 150–450)
POTASSIUM SERPL-SCNC: 4.9 MMOL/L (ref 3.4–5.3)
RBC # BLD AUTO: 3.84 10E12/L (ref 4.4–5.9)
SODIUM SERPL-SCNC: 140 MMOL/L (ref 133–144)
TROPONIN I SERPL-MCNC: 0.65 UG/L (ref 0–0.04)
TROPONIN I SERPL-MCNC: 0.71 UG/L (ref 0–0.04)
WBC # BLD AUTO: 7.5 10E9/L (ref 4–11)

## 2018-05-29 PROCEDURE — 99233 SBSQ HOSP IP/OBS HIGH 50: CPT | Performed by: PHYSICIAN ASSISTANT

## 2018-05-29 PROCEDURE — A9270 NON-COVERED ITEM OR SERVICE: HCPCS | Mod: GY | Performed by: HOSPITALIST

## 2018-05-29 PROCEDURE — 25000132 ZZH RX MED GY IP 250 OP 250 PS 637: Mod: GY | Performed by: INTERNAL MEDICINE

## 2018-05-29 PROCEDURE — 80048 BASIC METABOLIC PNL TOTAL CA: CPT | Performed by: PHYSICIAN ASSISTANT

## 2018-05-29 PROCEDURE — A9270 NON-COVERED ITEM OR SERVICE: HCPCS | Mod: GY | Performed by: INTERNAL MEDICINE

## 2018-05-29 PROCEDURE — 85520 HEPARIN ASSAY: CPT | Performed by: INTERNAL MEDICINE

## 2018-05-29 PROCEDURE — 36415 COLL VENOUS BLD VENIPUNCTURE: CPT | Performed by: PHYSICIAN ASSISTANT

## 2018-05-29 PROCEDURE — 00000146 ZZHCL STATISTIC GLUCOSE BY METER IP

## 2018-05-29 PROCEDURE — 84484 ASSAY OF TROPONIN QUANT: CPT | Performed by: PHYSICIAN ASSISTANT

## 2018-05-29 PROCEDURE — 36415 COLL VENOUS BLD VENIPUNCTURE: CPT | Performed by: INTERNAL MEDICINE

## 2018-05-29 PROCEDURE — 85520 HEPARIN ASSAY: CPT | Performed by: HOSPITALIST

## 2018-05-29 PROCEDURE — 99233 SBSQ HOSP IP/OBS HIGH 50: CPT | Performed by: INTERNAL MEDICINE

## 2018-05-29 PROCEDURE — 84484 ASSAY OF TROPONIN QUANT: CPT | Performed by: HOSPITALIST

## 2018-05-29 PROCEDURE — 25000128 H RX IP 250 OP 636: Performed by: HOSPITALIST

## 2018-05-29 PROCEDURE — 25000132 ZZH RX MED GY IP 250 OP 250 PS 637: Mod: GY | Performed by: HOSPITALIST

## 2018-05-29 PROCEDURE — 85027 COMPLETE CBC AUTOMATED: CPT | Performed by: PHYSICIAN ASSISTANT

## 2018-05-29 PROCEDURE — 36415 COLL VENOUS BLD VENIPUNCTURE: CPT | Performed by: HOSPITALIST

## 2018-05-29 PROCEDURE — 21000001 ZZH R&B HEART CARE

## 2018-05-29 RX ORDER — CLOPIDOGREL BISULFATE 75 MG/1
75 TABLET ORAL DAILY
Status: DISCONTINUED | OUTPATIENT
Start: 2018-05-29 | End: 2018-05-31 | Stop reason: HOSPADM

## 2018-05-29 RX ORDER — ISOSORBIDE MONONITRATE 30 MG/1
30 TABLET, EXTENDED RELEASE ORAL DAILY
Status: DISCONTINUED | OUTPATIENT
Start: 2018-05-29 | End: 2018-05-31 | Stop reason: HOSPADM

## 2018-05-29 RX ORDER — METOPROLOL TARTRATE 50 MG
50 TABLET ORAL 2 TIMES DAILY
Status: DISCONTINUED | OUTPATIENT
Start: 2018-05-29 | End: 2018-05-31 | Stop reason: HOSPADM

## 2018-05-29 RX ORDER — IPRATROPIUM BROMIDE 21 UG/1
SPRAY, METERED NASAL
Qty: 30 ML | Refills: 0 | Status: SHIPPED | OUTPATIENT
Start: 2018-05-29 | End: 2018-08-22

## 2018-05-29 RX ORDER — ATORVASTATIN CALCIUM 40 MG/1
40 TABLET, FILM COATED ORAL DAILY
Status: DISCONTINUED | OUTPATIENT
Start: 2018-05-29 | End: 2018-05-31 | Stop reason: HOSPADM

## 2018-05-29 RX ADMIN — METOPROLOL TARTRATE 25 MG: 25 TABLET ORAL at 08:22

## 2018-05-29 RX ADMIN — HEPARIN SODIUM 650 UNITS/HR: 10000 INJECTION, SOLUTION INTRAVENOUS at 00:10

## 2018-05-29 RX ADMIN — METOPROLOL TARTRATE 50 MG: 50 TABLET ORAL at 21:58

## 2018-05-29 RX ADMIN — ASPIRIN 81 MG 324 MG: 81 TABLET ORAL at 08:21

## 2018-05-29 RX ADMIN — CLOPIDOGREL 75 MG: 75 TABLET, FILM COATED ORAL at 11:33

## 2018-05-29 RX ADMIN — ATORVASTATIN CALCIUM 40 MG: 40 TABLET, FILM COATED ORAL at 11:33

## 2018-05-29 RX ADMIN — Medication 2000 UNITS: at 22:02

## 2018-05-29 RX ADMIN — Medication 3 MG: at 22:00

## 2018-05-29 RX ADMIN — ISOSORBIDE MONONITRATE 30 MG: 30 TABLET, EXTENDED RELEASE ORAL at 11:33

## 2018-05-29 RX ADMIN — Medication 400 MG: at 08:22

## 2018-05-29 ASSESSMENT — VISUAL ACUITY
OU: BLURRED VISION;GLASSES
OU: BLURRED VISION;GLASSES;OTHER (SEE COMMENT)
OU: BLURRED VISION;GLASSES
OU: GLASSES;BLURRED VISION;BASELINE

## 2018-05-29 NOTE — PROGRESS NOTES
[facility]    Hospitalist Progress Note    Date of Service (when I saw the patient): 05/29/2018    Assessment & Plan   Dustin Pearce is a markedly pleasant 89 year old gentleman with extensive past medical history most notable for CAD, chronic systolic CHF, Diabetes mellitus, moderate aortic stenosis, PAD status post lower extremity angioplasties, mild left carotid artery stenosis, chronic paroxysmal afib, GERD, hypertension and dyslipidemia who presents with dizziness, nausea and vomiting and chest pain and is found to have hypotension, lactic acidosis, and hypoxia that was initially concerning for sepsis.  No infection has been identified and his SIRS syndrome is improving.  He is undergoing cardiac workup for NSTEMI.      SIRS syndrome, resolving: On presentation, Mr. Pearce was very ill appearing, actively vomiting. Initial BP 89/50, improved to 140/71 with IV fluid bolus. Hypoxic to upper 80% range on room air. He is afebrile and pulse 50-60's. CBC showed WBC 9.5, HGB 12, . Lactate is 4.8, improved to 1.3 with IV fluid. CMP unremarkable. D-Dimer mildly elevated. CT PE negative for acute pathology as is CXR. EKG shows NSR with ST segment depressions in V3-4.  -- Procalcitonin negative.  -- Although the first two Troponins are negative, the third troponin was abn at 0.119.  Concern for unstable angina/NSTEMI, see below.  -- UA is relatively bland (3 WBC). Blood cultures no growth to date, urine culture negative.   -- Was treated with IV Vancomycin and Zosyn.  -- CT abdomen and pelvis: No cause of acute pain identified in the abdomen or pelvis. Colonic diverticulosis without evidence of diverticulitis  -- Neuro checks without any concerning changes  ----Overall he appears to have SIRS syndrome of unclear cause.  As no source of infection was found, the likelihood of true sepsis is low. He could have had viral gastroenteritis. He was hospitalized here 2/2018 for very similar presentation  and found to have E faecalis UTI does not seem to have a urinary tract infection at this time. He appears to have unstable angina. His wife initially was concerned for stroke but he has no focal neurologic deficits at  time of admission making stroke less likely at present as well. His right leg drag could have been a TIA exacerbated by hypertension (note being made of hospitalization for TIA in 12/2017). Last, review of notes in Epic suggests that he has chronic orthostatic hypotension.  -- IV fluids have been discontinued  -- Zosyn discontinued   -- Advance diet   -- Antiemetics as needed      CAD, chronic systolic CHF  Troponin elevation, concern for unstable angina/NSTEMI: He has a history of remote MI. He is followed by [facility]. Most recent TTE 1/2018 showed EF 35-40% with multiple WMA's, and moderate AS and mild aortic root dilation. Recent Nuclear Stress test in 2017 reportedly showed persistent reversible inferior wall ischemia.   -- Cardiology has been consulted, input appreciated.  -- Troponin 2.083-->2.330-->1.656-->1.839  -- Echo with EF of 35-40%.  Mod - severe inferlateral wall hypokinesis, mod inf wall hypokinesis  -- Echo from 1/1/18 had EF of 35-40%, severe hypokinesis to akinesis of entire inf and mid to basal inferolateral wall.  -- Continue Plavix and aspirin.  -- Continue statin  -- Start Imdur for angina sx  -- NTG IV if refractory CP  -- Resume Toprol and lisinopril pending improvement in BP  -- Patient and wife originally opted for conservative management. 5/28/18 Pt had an episode of recurrent chest pain associated with recurrent anterolateral ST depression and dynamic inferior T wave changes, which resolved with 2 NTG SL tabs. The troponin level increased slightly overnight from 0.5 to 0.7. Previous peak troponin was 2.3 on 5/27/18. No recurrent chest pain since last night.  -- Heparin gtt restarted.  -- Patient and wife now in agreement with coronary angiogram.  Planned for tomorrow  morning.        Prior TIA: Plavix was added to aspirin in 12/2017.       Type 2 Diabetes mellitus:  A1c is 9.1.  - Hold Metformin and Glipizide  - Use ISS insulin while hospitalized.      Anemia: Mild, monitor  - Hgb 10.7      Chronic depression, GERD, BPH: Resume Cymbalta, PPI, Flomax upon discharge.     # Pain Assessment:  Current Pain Score 5/29/2018   Patient currently in pain? denies   Pain score (0-10) -   Pain location -   Pain descriptors -   Dustin s pain level was assessed and he currently denies pain.       DVT Prophylaxis: On heparin  Code Status: Full Code     Disposition: Plan for cor angio tomorrow 5/30.  Discharge pending cardiology recommendations.    Brandynjuan Graves    Interval History   Patient reports feeling well.  He is a little fatigued.  Had recurrent episode of chest pain overnight with details as above.  CP relieved with NTG and no pain currently. Denies any fever, chills, shortness of breath, abdominal pain.  Currently no nausea or vomiting.  He is tolerating a diet.  Wife present at bedside and updated.     -Data reviewed today: I reviewed all new labs and imaging results over the last 24 hours.     Physical Exam   Temp: 98.1  F (36.7  C) Temp src: Oral BP: 154/89   Heart Rate: 60 Resp: 18 SpO2: 93 % O2 Device: Nasal cannula Oxygen Delivery: 2 LPM  Vitals:    05/27/18 0319 05/28/18 0100 05/29/18 0556   Weight: 75.3 kg (166 lb) 76 kg (167 lb 8 oz) 74.9 kg (165 lb 1.6 oz)     Vital Signs with Ranges  Temp:  [97.8  F (36.6  C)-98.1  F (36.7  C)] 98.1  F (36.7  C)  Heart Rate:  [60-79] 60  Resp:  [16-18] 18  BP: (125-179)/(47-89) 154/89  SpO2:  [90 %-93 %] 93 %  I/O last 3 completed shifts:  In: 920 [P.O.:720; I.V.:200]  Out: 800 [Urine:800]    Constitutional: Alert, oriented to person, place, situation.  Cooperative, sitting up in the chair in NAD.  Respiratory:  Lungs CTAB.  No crackles, wheezes, or rhonchi, no labored breathing.  Cardiovascular:  Heart RRR, grade 2/6 systolic murmur  at apex, no edema.  GI:  Abdomen soft, NT/ND and with normoactive BS  Skin/Integumen:  Warm, dry, non-diaphoretic.  MSK: CMS x4 intact.    Medications     HEParin 650 Units/hr (05/29/18 0010)       aspirin  324 mg Oral Daily     insulin aspart  1-7 Units Subcutaneous TID AC     insulin aspart  1-5 Units Subcutaneous At Bedtime     magnesium oxide  400 mg Oral Daily     metoprolol tartrate  25 mg Oral BID     sodium chloride (PF)  3 mL Intracatheter Q8H       Data     Recent Labs  Lab 05/29/18  0635 05/29/18  0320 05/28/18  2310 05/28/18  0555  05/27/18  1814  05/27/18  0710  05/27/18  0119 05/26/18 2035   WBC 7.5  --   --  6.5  --   --   --  7.6  --   --  9.5   HGB 11.1*  --   --  10.5*  --   --   --  10.7*  --   --  12.0*   MCV 92  --   --  93  --   --   --  92  --   --  92     --   --  145*  --   --   --  144*  --   --  189     --   --  141  --  144  --  142  --   --  141   POTASSIUM 4.9  --   --  4.9  --  4.3  --  4.8  --   --  4.4   CHLORIDE 106  --   --  108  --  112*  --  112*  --   --  106   CO2 28  --   --  27  --  29  --  25  --   --  23   BUN 8  --   --  11  --  13  --  20  --   --  24   CR 0.82  --   --  0.84  --  0.80  --  0.84  --   --  1.06   ANIONGAP 6  --   --  6  --  3  --  5  --   --  12   ALLISON 8.8  --   --  8.4*  --  8.0*  --  8.0*  --   --  8.8   *  --   --  188*  --  163*  --  195*  --   --  183*   ALBUMIN  --   --   --   --   --   --   --  2.9*  --   --  3.5   PROTTOTAL  --   --   --   --   --   --   --  5.8*  --   --  7.0   BILITOTAL  --   --   --   --   --   --   --  0.3  --   --  0.3   ALKPHOS  --   --   --   --   --   --   --  60  --   --  69   ALT  --   --   --   --   --   --   --  15  --   --  18   AST  --   --   --   --   --   --   --  18  --   --  20   TROPI 0.651* 0.707* 0.542*  --   < > 2.330*  < > 0.898*  < >  --  0.020   TROPONIN  --   --   --   --   --   --   --   --   --  0.00  --    < > = values in this interval not displayed.    No results found for this or  any previous visit (from the past 24 hour(s)).

## 2018-05-29 NOTE — PLAN OF CARE
Problem: Patient Care Overview  Goal: Plan of Care/Patient Progress Review  Outcome: No Change  [id number] RN: Pt a/o, forgetful at times. VSS, 2L o2 via nasal cannula for comfort. Tele SR w/ BBB. Denies chest pain, nausea, or SOB overnight. Neuro's intact. Heparin gtt infusing @ 650 units/hr, Xa this AM. Troponin's .542 & .707. . Plan for cardiology re-consult today regarding recurrent chest pain. Will continue to monitor.

## 2018-05-29 NOTE — PROGRESS NOTES
"Paged for recurrent onset of chest pain tonight. I have seen him at the bedside and he says while trying to go to the bathroom tonight he had recurrent tightness of the chest radiaing across it, similar to his admission chest tightness though not as severe. He was nauseated again with this pain though he did not vomit. He called nursing staff who promptly gave him 2 nitro tablets with full resolution of symptoms. He is now pain free. EKG was done and this shows recurrent ST segment depressions in V3-4, similar to admission EKG, but now also with depressions in V5-6.     We discussed his hospital course thus far, and I explained to him that I am concerned for ongoing ACS. I have ordered full strength aspirin tonight and also ordered to resume Heparin infusion, stopped earlier today after running for about 24 hours. Will also order a new set of Troponins. He may need to have further discussions with Cardiology in AM now that things have changed regarding possibly undergoing an angiogram (he tells me \"I'm not for it or against it but only want to do it if it's likely it would find something that can be addressed.\")    If his pain recurs this evening and is not controlled with nitro, we will call Cardiology emergently overnight.  "

## 2018-05-29 NOTE — TELEPHONE ENCOUNTER
Pt is currently in the hospital    Routing refill request to provider for review/approval because:  Drug not active on patient's medication list (Maybe d/t inpatient status)     Please review and authorize if appropriate,     Thank you,   Kathy GILLESPIE RN

## 2018-05-29 NOTE — PROGRESS NOTES
Assessment and Plan:     Dustin Pearce is a 89 year old male who was admitted on 5/26/2018. Cardiology saw the patient for troponin elevation in setting of sepsis (elevated lactate), chronic systolic CHF, moderate AS, diabetes mellitus, PAD s/p lower extremity angioplasty/stent, paroxysmal afib, GERD, hypertension, dyslipidemia admitted for recurrent dizziness, syncope, nausea and vomiting, with chest pain developing late in the presentation after vomiting episodes.        CTPE negative for PE.       Management was initially conservative due to lack of recurrent symptoms and presumption that the acute, underlying illness caused a small, demand infarction and that the patient has been asymptomatic from a cardiac standpoint prior to presentation and since the day of admission.     However, last night he had an episode of recurrent chest pain associated with recurrent anterolateral ST depression and dynamic inferior T wave changes, which resolved with 2 NTG SL tabs. The troponin level increased slightly overnight from 0.5 to 0.7. Previous peak troponin was 2.3 on 5/27/18. No recurrent chest pain since last night. Previous nuclear stress test did show inferior ischemia, managed medically since then. Repeat echo here showed EF 35-40% with inferior and inferolateral wall hypokinesis consistent with previous nuclear study and echo. Wall motion abnormalities are similar to echo 12/2017.       1) Troponin elevation with dynamic ECG changes consistent with ischemia and recurrent chest pain relieved with NTG. Pt has known CAD and history of abnormal stress test. I recommended coronary angiography due to unstable angina symptoms in the setting of small NSTEMI. Risks and benefits of left heart catheterization and coronary angiogram were discussed with the patient in detail. Risk estimated at 0.1-0.3% for diagnostic angio and 1-2% for PCI, including risk of stroke, MI, death, emergent bypass, contrast induced allergic  "reaction, renal dysfunction, and vascular complications (including bleeding and transfusion) were discussed. Patient understands and wishes to proceed.     -continue ASA  -continue Plavix  -continue statin  -continue toprol, lisinopril pending improvement in BP  -NTG IV if pt has refractory chest pain.    Will plan for angiogram tomorrow due to high cath lab volume after holiday. Pt understands and agrees.           2) Sepsis  -etiology unclear. Management per hospitalist team.   -likely contributing to demand ischemia picture  -unusual that no leukocytosis persent     3) Anemia  -monitor in setting of demand ischemia  -currently 10.7    4) I can find no evidence or confirmation of PAF in the medical record. For this reason, the patient is not on anticoagulation. Rhythm has been sinus with PVC's here.   5) PAD  6) Chronic systolic HF  7) Moderate AS  8) HTN  9) Dyslipidemia  10) History of stroke      OK to eat today.  Plan for angiogram tomorrow. Keep NPO after midnight.   He has a history of orthostatic syncope. We will be careful with BP lowering meds. Increase metoprolol today.      LEATHA PRICE MD        Interval History:   Chest pain last night as above. No recurrence. No other complaints.           Medications:       aspirin  324 mg Oral Daily     insulin aspart  1-7 Units Subcutaneous TID AC     insulin aspart  1-5 Units Subcutaneous At Bedtime     magnesium oxide  400 mg Oral Daily     metoprolol tartrate  25 mg Oral BID     sodium chloride (PF)  3 mL Intracatheter Q8H            Physical Exam:   Blood pressure 155/69, pulse 60, temperature 97.9  F (36.6  C), temperature source Oral, resp. rate 18, height 1.676 m (5' 6\"), weight 74.9 kg (165 lb 1.6 oz), SpO2 94 %.    Vitals:    05/27/18 0319 05/28/18 0100 05/29/18 0556   Weight: 75.3 kg (166 lb) 76 kg (167 lb 8 oz) 74.9 kg (165 lb 1.6 oz)         Intake/Output Summary (Last 24 hours) at 05/29/18 1037  Last data filed at 05/28/18 2220   Gross per 24 " hour   Intake              680 ml   Output              550 ml   Net              130 ml       05/24 0700 - 05/29 0659  In: 1287 [P.O.:840; I.V.:447]  Out: 1250 [Urine:1250]  Net: 37    Exam:  GENERAL APPEARANCE ADULT: Alert, in no acute distress  RESP: lungs clear to auscultation   CV: RRR with systolic murmur at base, 2/6  ABDOMEN: normal bowel sounds, soft, nontender, no hepatosplenomegaly or other masses  EXTREMITIES: No edema         Data:   LABS (Last four results)  CMP  Recent Labs  Lab 05/29/18  0635 05/28/18  0555 05/27/18  1814 05/27/18  0710 05/26/18 2035    141 144 142 141   POTASSIUM 4.9 4.9 4.3 4.8 4.4   CHLORIDE 106 108 112* 112* 106   CO2 28 27 29 25 23   ANIONGAP 6 6 3 5 12   * 188* 163* 195* 183*   BUN 8 11 13 20 24   CR 0.82 0.84 0.80 0.84 1.06   GFRESTIMATED 88 86 >90 85 66   GFRESTBLACK >90 >90 >90 >90 79   ALLISON 8.8 8.4* 8.0* 8.0* 8.8   MAG  --  1.9 1.5*  --   --    PROTTOTAL  --   --   --  5.8* 7.0   ALBUMIN  --   --   --  2.9* 3.5   BILITOTAL  --   --   --  0.3 0.3   ALKPHOS  --   --   --  60 69   AST  --   --   --  18 20   ALT  --   --   --  15 18     CBC  Recent Labs  Lab 05/29/18  0635 05/28/18  0555 05/27/18  0710 05/26/18 2035   WBC 7.5 6.5 7.6 9.5   RBC 3.84* 3.66* 3.65* 4.14*   HGB 11.1* 10.5* 10.7* 12.0*   HCT 35.3* 33.9* 33.5* 38.2*   MCV 92 93 92 92   MCH 28.9 28.7 29.3 29.0   MCHC 31.4* 31.0* 31.9 31.4*   RDW 14.5 14.5 14.3 14.2    145* 144* 189     INRNo lab results found in last 7 days.  TROPONINS Lab Results   Component Value Date    TROPI 0.651 () 05/29/2018    TROPI 0.707 () 05/29/2018    TROPI 0.542 () 05/28/2018    TROPI 1.839 () 05/28/2018    TROPI 1.656 () 05/27/2018    TROPONIN 0.00 05/27/2018                                                                                                               LEATHA PRICE MD

## 2018-05-29 NOTE — PLAN OF CARE
Problem: Patient Care Overview  Goal: Plan of Care/Patient Progress Review  Outcome: No Change  Vss, AOx4, neuros are intact. [place]. Tele: SR with BBB.  Denies shortness of breath on exertion. Room air. Heart healthy mod CHO diet. Up to the bathroom with assist of 1. Episode of chest pain with nausea, oxygen administered, nitro given x2, ekg taken, hospitalist paged. Asymptomatic after nitro given x2. No emesis.

## 2018-05-29 NOTE — PLAN OF CARE
"Problem: Patient Care Overview  Goal: Plan of Care/Patient Progress Review  Outcome: No Change  Pt A/O x4 but forgetful and impulsive. VSS. Up with AX1. Heparin gtt infusing at 650 u/hr. Denies CP. Tele shows SR w/ BBB. Most recent troponin 0.651 which is a trend down. Planned for angio tomorrow. Pt educated on expectations, will need reinforcement. Neuro checks intact. All questions answered.    Blood pressure 147/70, pulse 73, temperature 98.3  F (36.8  C), temperature source Oral, resp. rate 16, height 1.676 m (5' 6\"), weight 74.9 kg (165 lb 1.6 oz), SpO2 92 %.          "

## 2018-05-29 NOTE — PROVIDER NOTIFICATION
Hospitalist on call Zachary BEDOYA paged in regard to chest pain.   MD to place orders for stat troponin and serial troponin draws.

## 2018-05-29 NOTE — PLAN OF CARE
Problem: Patient Care Overview  Goal: Plan of Care/Patient Progress Review  Angio tomorrow, no chest pain, tolerated food walker around the station.

## 2018-05-30 ENCOUNTER — APPOINTMENT (OUTPATIENT)
Dept: CARDIOLOGY | Facility: CLINIC | Age: 83
DRG: 247 | End: 2018-05-30
Attending: INTERNAL MEDICINE
Payer: MEDICARE

## 2018-05-30 LAB
ANION GAP SERPL CALCULATED.3IONS-SCNC: 9 MMOL/L (ref 3–14)
BUN SERPL-MCNC: 12 MG/DL (ref 7–30)
CALCIUM SERPL-MCNC: 9 MG/DL (ref 8.5–10.1)
CHLORIDE SERPL-SCNC: 104 MMOL/L (ref 94–109)
CO2 SERPL-SCNC: 25 MMOL/L (ref 20–32)
CREAT SERPL-MCNC: 0.68 MG/DL (ref 0.66–1.25)
ERYTHROCYTE [DISTWIDTH] IN BLOOD BY AUTOMATED COUNT: 14.3 % (ref 10–15)
GFR SERPL CREATININE-BSD FRML MDRD: >90 ML/MIN/1.7M2
GLUCOSE BLDC GLUCOMTR-MCNC: 145 MG/DL (ref 70–99)
GLUCOSE BLDC GLUCOMTR-MCNC: 160 MG/DL (ref 70–99)
GLUCOSE BLDC GLUCOMTR-MCNC: 177 MG/DL (ref 70–99)
GLUCOSE BLDC GLUCOMTR-MCNC: 189 MG/DL (ref 70–99)
GLUCOSE BLDC GLUCOMTR-MCNC: 193 MG/DL (ref 70–99)
GLUCOSE SERPL-MCNC: 211 MG/DL (ref 70–99)
HCT VFR BLD AUTO: 34.4 % (ref 40–53)
HGB BLD-MCNC: 11 G/DL (ref 13.3–17.7)
KCT BLD-ACNC: 266 SEC (ref 75–150)
LMWH PPP CHRO-ACNC: 0.3 IU/ML
MCH RBC QN AUTO: 28.8 PG (ref 26.5–33)
MCHC RBC AUTO-ENTMCNC: 32 G/DL (ref 31.5–36.5)
MCV RBC AUTO: 90 FL (ref 78–100)
PLATELET # BLD AUTO: 166 10E9/L (ref 150–450)
POTASSIUM SERPL-SCNC: 4.2 MMOL/L (ref 3.4–5.3)
RBC # BLD AUTO: 3.82 10E12/L (ref 4.4–5.9)
SODIUM SERPL-SCNC: 138 MMOL/L (ref 133–144)
WBC # BLD AUTO: 9.1 10E9/L (ref 4–11)

## 2018-05-30 PROCEDURE — C1887 CATHETER, GUIDING: HCPCS

## 2018-05-30 PROCEDURE — A9270 NON-COVERED ITEM OR SERVICE: HCPCS | Mod: GY | Performed by: INTERNAL MEDICINE

## 2018-05-30 PROCEDURE — 25000125 ZZHC RX 250: Performed by: INTERNAL MEDICINE

## 2018-05-30 PROCEDURE — 21000000 ZZH R&B IMCU HEART CARE

## 2018-05-30 PROCEDURE — B2111ZZ FLUOROSCOPY OF MULTIPLE CORONARY ARTERIES USING LOW OSMOLAR CONTRAST: ICD-10-PCS | Performed by: INTERNAL MEDICINE

## 2018-05-30 PROCEDURE — 99152 MOD SED SAME PHYS/QHP 5/>YRS: CPT | Mod: GC | Performed by: INTERNAL MEDICINE

## 2018-05-30 PROCEDURE — 80048 BASIC METABOLIC PNL TOTAL CA: CPT | Performed by: INTERNAL MEDICINE

## 2018-05-30 PROCEDURE — 93454 CORONARY ARTERY ANGIO S&I: CPT

## 2018-05-30 PROCEDURE — 27210795 ZZH PAD DEFIB QUICK CR4

## 2018-05-30 PROCEDURE — 92928 PRQ TCAT PLMT NTRAC ST 1 LES: CPT | Mod: LD | Performed by: INTERNAL MEDICINE

## 2018-05-30 PROCEDURE — C1725 CATH, TRANSLUMIN NON-LASER: HCPCS

## 2018-05-30 PROCEDURE — 25000128 H RX IP 250 OP 636

## 2018-05-30 PROCEDURE — 99153 MOD SED SAME PHYS/QHP EA: CPT

## 2018-05-30 PROCEDURE — 25000128 H RX IP 250 OP 636: Performed by: INTERNAL MEDICINE

## 2018-05-30 PROCEDURE — 93005 ELECTROCARDIOGRAM TRACING: CPT

## 2018-05-30 PROCEDURE — 25000132 ZZH RX MED GY IP 250 OP 250 PS 637: Mod: GY | Performed by: INTERNAL MEDICINE

## 2018-05-30 PROCEDURE — 00000146 ZZHCL STATISTIC GLUCOSE BY METER IP

## 2018-05-30 PROCEDURE — 85520 HEPARIN ASSAY: CPT | Performed by: INTERNAL MEDICINE

## 2018-05-30 PROCEDURE — C9600 PERC DRUG-EL COR STENT SING: HCPCS | Mod: LC

## 2018-05-30 PROCEDURE — C1874 STENT, COATED/COV W/DEL SYS: HCPCS

## 2018-05-30 PROCEDURE — 93454 CORONARY ARTERY ANGIO S&I: CPT | Mod: 26 | Performed by: INTERNAL MEDICINE

## 2018-05-30 PROCEDURE — C1769 GUIDE WIRE: HCPCS

## 2018-05-30 PROCEDURE — 27210759 ZZH DEVICE INFLATION CR6

## 2018-05-30 PROCEDURE — 27210856 ZZH ACCESS HEART CATH CR2

## 2018-05-30 PROCEDURE — 27210787 ZZH MANIFOLD CR2

## 2018-05-30 PROCEDURE — 99233 SBSQ HOSP IP/OBS HIGH 50: CPT | Performed by: PHYSICIAN ASSISTANT

## 2018-05-30 PROCEDURE — C1760 CLOSURE DEV, VASC: HCPCS

## 2018-05-30 PROCEDURE — 27210946 ZZH KIT HC TOTES DISP CR8

## 2018-05-30 PROCEDURE — 99207 ZZC CDG-MDM COMPONENT: MEETS MODERATE - UP CODED: CPT | Performed by: PHYSICIAN ASSISTANT

## 2018-05-30 PROCEDURE — 93010 ELECTROCARDIOGRAM REPORT: CPT | Performed by: INTERNAL MEDICINE

## 2018-05-30 PROCEDURE — 27211089 ZZH KIT ACIST INJECTOR CR3

## 2018-05-30 PROCEDURE — 85347 COAGULATION TIME ACTIVATED: CPT

## 2018-05-30 PROCEDURE — 99232 SBSQ HOSP IP/OBS MODERATE 35: CPT | Mod: 25 | Performed by: INTERNAL MEDICINE

## 2018-05-30 PROCEDURE — 85027 COMPLETE CBC AUTOMATED: CPT | Performed by: INTERNAL MEDICINE

## 2018-05-30 PROCEDURE — 27210914 ZZH SHEATH CR8

## 2018-05-30 PROCEDURE — 027135Z DILATION OF CORONARY ARTERY, TWO ARTERIES WITH TWO DRUG-ELUTING INTRALUMINAL DEVICES, PERCUTANEOUS APPROACH: ICD-10-PCS | Performed by: INTERNAL MEDICINE

## 2018-05-30 PROCEDURE — 36415 COLL VENOUS BLD VENIPUNCTURE: CPT | Performed by: INTERNAL MEDICINE

## 2018-05-30 PROCEDURE — 27210998 ZZH ACCESS HEART CATH CR6

## 2018-05-30 PROCEDURE — 99152 MOD SED SAME PHYS/QHP 5/>YRS: CPT

## 2018-05-30 RX ORDER — ASPIRIN 81 MG/1
81 TABLET ORAL DAILY
Status: DISCONTINUED | OUTPATIENT
Start: 2018-05-31 | End: 2018-05-31 | Stop reason: HOSPADM

## 2018-05-30 RX ORDER — SODIUM CHLORIDE 9 MG/ML
INJECTION, SOLUTION INTRAVENOUS CONTINUOUS
Status: DISCONTINUED | OUTPATIENT
Start: 2018-05-30 | End: 2018-05-30 | Stop reason: HOSPADM

## 2018-05-30 RX ORDER — NITROGLYCERIN 5 MG/ML
100-500 VIAL (ML) INTRAVENOUS
Status: COMPLETED | OUTPATIENT
Start: 2018-05-30 | End: 2018-05-30

## 2018-05-30 RX ORDER — IOPAMIDOL 755 MG/ML
255 INJECTION, SOLUTION INTRAVASCULAR ONCE
Status: DISCONTINUED | OUTPATIENT
Start: 2018-05-30 | End: 2018-05-31

## 2018-05-30 RX ORDER — HEPARIN SODIUM 1000 [USP'U]/ML
1000-10000 INJECTION, SOLUTION INTRAVENOUS; SUBCUTANEOUS EVERY 5 MIN PRN
Status: DISCONTINUED | OUTPATIENT
Start: 2018-05-30 | End: 2018-05-30 | Stop reason: HOSPADM

## 2018-05-30 RX ORDER — ADENOSINE 3 MG/ML
12-12000 INJECTION, SOLUTION INTRAVENOUS
Status: DISCONTINUED | OUTPATIENT
Start: 2018-05-30 | End: 2018-05-30 | Stop reason: HOSPADM

## 2018-05-30 RX ORDER — ASPIRIN 81 MG/1
81-324 TABLET, CHEWABLE ORAL
Status: DISCONTINUED | OUTPATIENT
Start: 2018-05-30 | End: 2018-05-30 | Stop reason: HOSPADM

## 2018-05-30 RX ORDER — METOPROLOL TARTRATE 1 MG/ML
5 INJECTION, SOLUTION INTRAVENOUS EVERY 5 MIN PRN
Status: DISCONTINUED | OUTPATIENT
Start: 2018-05-30 | End: 2018-05-30 | Stop reason: HOSPADM

## 2018-05-30 RX ORDER — NICARDIPINE HYDROCHLORIDE 2.5 MG/ML
100 INJECTION INTRAVENOUS
Status: DISCONTINUED | OUTPATIENT
Start: 2018-05-30 | End: 2018-05-30 | Stop reason: HOSPADM

## 2018-05-30 RX ORDER — SODIUM NITROPRUSSIDE 25 MG/ML
100-200 INJECTION INTRAVENOUS
Status: DISCONTINUED | OUTPATIENT
Start: 2018-05-30 | End: 2018-05-30 | Stop reason: HOSPADM

## 2018-05-30 RX ORDER — CLOPIDOGREL BISULFATE 75 MG/1
75 TABLET ORAL
Status: DISCONTINUED | OUTPATIENT
Start: 2018-05-30 | End: 2018-05-30 | Stop reason: HOSPADM

## 2018-05-30 RX ORDER — PROTAMINE SULFATE 10 MG/ML
25-100 INJECTION, SOLUTION INTRAVENOUS EVERY 5 MIN PRN
Status: DISCONTINUED | OUTPATIENT
Start: 2018-05-30 | End: 2018-05-30 | Stop reason: HOSPADM

## 2018-05-30 RX ORDER — PRASUGREL 10 MG/1
10-60 TABLET, FILM COATED ORAL
Status: DISCONTINUED | OUTPATIENT
Start: 2018-05-30 | End: 2018-05-30 | Stop reason: HOSPADM

## 2018-05-30 RX ORDER — NALOXONE HYDROCHLORIDE 0.4 MG/ML
0.4 INJECTION, SOLUTION INTRAMUSCULAR; INTRAVENOUS; SUBCUTANEOUS EVERY 5 MIN PRN
Status: DISCONTINUED | OUTPATIENT
Start: 2018-05-30 | End: 2018-05-30 | Stop reason: HOSPADM

## 2018-05-30 RX ORDER — LIDOCAINE HYDROCHLORIDE 10 MG/ML
1-10 INJECTION, SOLUTION EPIDURAL; INFILTRATION; INTRACAUDAL; PERINEURAL
Status: COMPLETED | OUTPATIENT
Start: 2018-05-30 | End: 2018-05-30

## 2018-05-30 RX ORDER — MORPHINE SULFATE 2 MG/ML
1-2 INJECTION, SOLUTION INTRAMUSCULAR; INTRAVENOUS EVERY 5 MIN PRN
Status: DISCONTINUED | OUTPATIENT
Start: 2018-05-30 | End: 2018-05-30 | Stop reason: HOSPADM

## 2018-05-30 RX ORDER — LIDOCAINE 40 MG/G
CREAM TOPICAL
Status: DISCONTINUED | OUTPATIENT
Start: 2018-05-30 | End: 2018-05-30 | Stop reason: HOSPADM

## 2018-05-30 RX ORDER — LIDOCAINE HYDROCHLORIDE 10 MG/ML
30 INJECTION, SOLUTION EPIDURAL; INFILTRATION; INTRACAUDAL; PERINEURAL
Status: DISCONTINUED | OUTPATIENT
Start: 2018-05-30 | End: 2018-05-30 | Stop reason: HOSPADM

## 2018-05-30 RX ORDER — CLOPIDOGREL 300 MG/1
300-600 TABLET, FILM COATED ORAL
Status: COMPLETED | OUTPATIENT
Start: 2018-05-30 | End: 2018-05-30

## 2018-05-30 RX ORDER — NITROGLYCERIN 0.4 MG/1
0.4 TABLET SUBLINGUAL EVERY 5 MIN PRN
Status: DISCONTINUED | OUTPATIENT
Start: 2018-05-30 | End: 2018-05-30

## 2018-05-30 RX ORDER — LORAZEPAM 2 MG/ML
0.5 INJECTION INTRAMUSCULAR
Status: DISCONTINUED | OUTPATIENT
Start: 2018-05-30 | End: 2018-05-30 | Stop reason: HOSPADM

## 2018-05-30 RX ORDER — FENTANYL CITRATE 50 UG/ML
25-50 INJECTION, SOLUTION INTRAMUSCULAR; INTRAVENOUS
Status: ACTIVE | OUTPATIENT
Start: 2018-05-30 | End: 2018-05-31

## 2018-05-30 RX ORDER — DEXTROSE MONOHYDRATE 25 G/50ML
12.5-5 INJECTION, SOLUTION INTRAVENOUS EVERY 30 MIN PRN
Status: DISCONTINUED | OUTPATIENT
Start: 2018-05-30 | End: 2018-05-30 | Stop reason: HOSPADM

## 2018-05-30 RX ORDER — ONDANSETRON 2 MG/ML
4 INJECTION INTRAMUSCULAR; INTRAVENOUS EVERY 4 HOURS PRN
Status: DISCONTINUED | OUTPATIENT
Start: 2018-05-30 | End: 2018-05-30 | Stop reason: HOSPADM

## 2018-05-30 RX ORDER — POTASSIUM CHLORIDE 7.45 MG/ML
10 INJECTION INTRAVENOUS
Status: DISCONTINUED | OUTPATIENT
Start: 2018-05-30 | End: 2018-05-30 | Stop reason: HOSPADM

## 2018-05-30 RX ORDER — POTASSIUM CHLORIDE 29.8 MG/ML
20 INJECTION INTRAVENOUS
Status: DISCONTINUED | OUTPATIENT
Start: 2018-05-30 | End: 2018-05-30 | Stop reason: HOSPADM

## 2018-05-30 RX ORDER — FUROSEMIDE 10 MG/ML
20-100 INJECTION INTRAMUSCULAR; INTRAVENOUS
Status: DISCONTINUED | OUTPATIENT
Start: 2018-05-30 | End: 2018-05-30 | Stop reason: HOSPADM

## 2018-05-30 RX ORDER — LORAZEPAM 0.5 MG/1
0.5 TABLET ORAL
Status: DISCONTINUED | OUTPATIENT
Start: 2018-05-30 | End: 2018-05-30 | Stop reason: HOSPADM

## 2018-05-30 RX ORDER — HYDRALAZINE HYDROCHLORIDE 20 MG/ML
10-20 INJECTION INTRAMUSCULAR; INTRAVENOUS
Status: DISCONTINUED | OUTPATIENT
Start: 2018-05-30 | End: 2018-05-30 | Stop reason: HOSPADM

## 2018-05-30 RX ORDER — ATROPINE SULFATE 0.1 MG/ML
0.5 INJECTION INTRAVENOUS EVERY 5 MIN PRN
Status: ACTIVE | OUTPATIENT
Start: 2018-05-30 | End: 2018-05-31

## 2018-05-30 RX ORDER — BUPIVACAINE HYDROCHLORIDE 2.5 MG/ML
1-10 INJECTION, SOLUTION EPIDURAL; INFILTRATION; INTRACAUDAL
Status: DISCONTINUED | OUTPATIENT
Start: 2018-05-30 | End: 2018-05-30 | Stop reason: HOSPADM

## 2018-05-30 RX ORDER — ACETAMINOPHEN 325 MG/1
325-650 TABLET ORAL EVERY 4 HOURS PRN
Status: DISCONTINUED | OUTPATIENT
Start: 2018-05-30 | End: 2018-05-31 | Stop reason: HOSPADM

## 2018-05-30 RX ORDER — POTASSIUM CHLORIDE 1500 MG/1
20 TABLET, EXTENDED RELEASE ORAL
Status: DISCONTINUED | OUTPATIENT
Start: 2018-05-30 | End: 2018-05-30 | Stop reason: HOSPADM

## 2018-05-30 RX ORDER — FLUMAZENIL 0.1 MG/ML
0.2 INJECTION, SOLUTION INTRAVENOUS
Status: DISCONTINUED | OUTPATIENT
Start: 2018-05-30 | End: 2018-05-30 | Stop reason: HOSPADM

## 2018-05-30 RX ORDER — PROTAMINE SULFATE 10 MG/ML
1-5 INJECTION, SOLUTION INTRAVENOUS
Status: DISCONTINUED | OUTPATIENT
Start: 2018-05-30 | End: 2018-05-30 | Stop reason: HOSPADM

## 2018-05-30 RX ORDER — METHYLPREDNISOLONE SODIUM SUCCINATE 125 MG/2ML
125 INJECTION, POWDER, LYOPHILIZED, FOR SOLUTION INTRAMUSCULAR; INTRAVENOUS
Status: DISCONTINUED | OUTPATIENT
Start: 2018-05-30 | End: 2018-05-30 | Stop reason: HOSPADM

## 2018-05-30 RX ORDER — NITROGLYCERIN 0.4 MG/1
0.4 TABLET SUBLINGUAL EVERY 5 MIN PRN
Status: DISCONTINUED | OUTPATIENT
Start: 2018-05-30 | End: 2018-05-30 | Stop reason: HOSPADM

## 2018-05-30 RX ORDER — NITROGLYCERIN 5 MG/ML
100-200 VIAL (ML) INTRAVENOUS
Status: DISCONTINUED | OUTPATIENT
Start: 2018-05-30 | End: 2018-05-30 | Stop reason: HOSPADM

## 2018-05-30 RX ORDER — ENALAPRILAT 1.25 MG/ML
1.25-2.5 INJECTION INTRAVENOUS
Status: DISCONTINUED | OUTPATIENT
Start: 2018-05-30 | End: 2018-05-30 | Stop reason: HOSPADM

## 2018-05-30 RX ORDER — LORAZEPAM 2 MG/ML
.5-2 INJECTION INTRAMUSCULAR EVERY 4 HOURS PRN
Status: DISCONTINUED | OUTPATIENT
Start: 2018-05-30 | End: 2018-05-30 | Stop reason: HOSPADM

## 2018-05-30 RX ORDER — VERAPAMIL HYDROCHLORIDE 2.5 MG/ML
1-2.5 INJECTION, SOLUTION INTRAVENOUS
Status: COMPLETED | OUTPATIENT
Start: 2018-05-30 | End: 2018-05-30

## 2018-05-30 RX ORDER — NITROGLYCERIN 20 MG/100ML
.07-2 INJECTION INTRAVENOUS CONTINUOUS PRN
Status: DISCONTINUED | OUTPATIENT
Start: 2018-05-30 | End: 2018-05-30 | Stop reason: HOSPADM

## 2018-05-30 RX ORDER — ASPIRIN 325 MG
325 TABLET ORAL
Status: DISCONTINUED | OUTPATIENT
Start: 2018-05-30 | End: 2018-05-30 | Stop reason: HOSPADM

## 2018-05-30 RX ORDER — NIFEDIPINE 10 MG/1
10 CAPSULE ORAL
Status: DISCONTINUED | OUTPATIENT
Start: 2018-05-30 | End: 2018-05-30 | Stop reason: HOSPADM

## 2018-05-30 RX ORDER — NALOXONE HYDROCHLORIDE 0.4 MG/ML
.2-.4 INJECTION, SOLUTION INTRAMUSCULAR; INTRAVENOUS; SUBCUTANEOUS
Status: DISCONTINUED | OUTPATIENT
Start: 2018-05-30 | End: 2018-05-30

## 2018-05-30 RX ORDER — NALOXONE HYDROCHLORIDE 0.4 MG/ML
.1-.4 INJECTION, SOLUTION INTRAMUSCULAR; INTRAVENOUS; SUBCUTANEOUS
Status: DISCONTINUED | OUTPATIENT
Start: 2018-05-30 | End: 2018-05-30

## 2018-05-30 RX ORDER — LISINOPRIL 5 MG/1
5 TABLET ORAL DAILY
Status: DISCONTINUED | OUTPATIENT
Start: 2018-05-30 | End: 2018-05-31 | Stop reason: HOSPADM

## 2018-05-30 RX ORDER — FENTANYL CITRATE 50 UG/ML
25-50 INJECTION, SOLUTION INTRAMUSCULAR; INTRAVENOUS
Status: DISCONTINUED | OUTPATIENT
Start: 2018-05-30 | End: 2018-05-30 | Stop reason: HOSPADM

## 2018-05-30 RX ORDER — ATROPINE SULFATE 0.1 MG/ML
.5-1 INJECTION INTRAVENOUS
Status: DISCONTINUED | OUTPATIENT
Start: 2018-05-30 | End: 2018-05-30 | Stop reason: HOSPADM

## 2018-05-30 RX ORDER — FLUMAZENIL 0.1 MG/ML
0.2 INJECTION, SOLUTION INTRAVENOUS
Status: ACTIVE | OUTPATIENT
Start: 2018-05-30 | End: 2018-05-31

## 2018-05-30 RX ORDER — DIPHENHYDRAMINE HYDROCHLORIDE 50 MG/ML
25-50 INJECTION INTRAMUSCULAR; INTRAVENOUS
Status: DISCONTINUED | OUTPATIENT
Start: 2018-05-30 | End: 2018-05-30 | Stop reason: HOSPADM

## 2018-05-30 RX ADMIN — CLOPIDOGREL BISULFATE 300 MG: 300 TABLET, FILM COATED ORAL at 12:58

## 2018-05-30 RX ADMIN — NITROGLYCERIN 200 MCG: 5 INJECTION, SOLUTION INTRAVENOUS at 11:37

## 2018-05-30 RX ADMIN — CLOPIDOGREL 75 MG: 75 TABLET, FILM COATED ORAL at 08:34

## 2018-05-30 RX ADMIN — Medication 400 MG: at 08:35

## 2018-05-30 RX ADMIN — LISINOPRIL 5 MG: 5 TABLET ORAL at 17:47

## 2018-05-30 RX ADMIN — MIDAZOLAM 1 MG: 1 INJECTION INTRAMUSCULAR; INTRAVENOUS at 12:00

## 2018-05-30 RX ADMIN — HEPARIN SODIUM 5000 UNITS: 1000 INJECTION, SOLUTION INTRAVENOUS; SUBCUTANEOUS at 11:41

## 2018-05-30 RX ADMIN — ISOSORBIDE MONONITRATE 30 MG: 30 TABLET, EXTENDED RELEASE ORAL at 08:35

## 2018-05-30 RX ADMIN — ASPIRIN 325 MG: 325 TABLET, DELAYED RELEASE ORAL at 08:35

## 2018-05-30 RX ADMIN — ATORVASTATIN CALCIUM 40 MG: 40 TABLET, FILM COATED ORAL at 08:35

## 2018-05-30 RX ADMIN — HEPARIN SODIUM 800 UNITS/HR: 10000 INJECTION, SOLUTION INTRAVENOUS at 06:46

## 2018-05-30 RX ADMIN — LIDOCAINE HYDROCHLORIDE 11 ML: 10 INJECTION, SOLUTION EPIDURAL; INFILTRATION; INTRACAUDAL; PERINEURAL at 11:56

## 2018-05-30 RX ADMIN — FENTANYL CITRATE 50 MCG: 50 INJECTION, SOLUTION INTRAMUSCULAR; INTRAVENOUS at 11:35

## 2018-05-30 RX ADMIN — MIDAZOLAM 1 MG: 1 INJECTION INTRAMUSCULAR; INTRAVENOUS at 11:35

## 2018-05-30 RX ADMIN — FENTANYL CITRATE 25 MCG: 50 INJECTION, SOLUTION INTRAMUSCULAR; INTRAVENOUS at 12:30

## 2018-05-30 RX ADMIN — METOPROLOL TARTRATE 50 MG: 50 TABLET ORAL at 20:48

## 2018-05-30 RX ADMIN — SODIUM CHLORIDE 75 ML/HR: 9 INJECTION, SOLUTION INTRAVENOUS at 08:12

## 2018-05-30 RX ADMIN — METOPROLOL TARTRATE 50 MG: 50 TABLET ORAL at 08:34

## 2018-05-30 RX ADMIN — VERAPAMIL HYDROCHLORIDE 2.5 MG: 2.5 INJECTION, SOLUTION INTRAVENOUS at 11:37

## 2018-05-30 RX ADMIN — FENTANYL CITRATE 25 MCG: 50 INJECTION, SOLUTION INTRAMUSCULAR; INTRAVENOUS at 12:00

## 2018-05-30 RX ADMIN — HEPARIN SODIUM 3000 UNITS: 1000 INJECTION, SOLUTION INTRAVENOUS; SUBCUTANEOUS at 12:13

## 2018-05-30 ASSESSMENT — VISUAL ACUITY
OU: NORMAL ACUITY;GLASSES

## 2018-05-30 NOTE — PROGRESS NOTES
[facility]  Cardiology Progress Note    Date of Service (when I saw the patient): 05/30/2018    Summary: Dustin Pearce is a 89 year old male with history of chronic systolic CHF, moderate AS, DM, PA s/p LE angioplasty/stenting, paroxysmal atrial fibrillation, HTN, dyslipidemia who was admitted on 5/26/2018 with dizziness, syncope, n/v, and chest discomfort after vomiting. CT was negative for PE. Cardiology was consulted due to troponin elevated. Initially he was managed conservatively due to lack of recurrent symptoms and the presumption that his underlying illness caused a small, demand infarct. He had recurrent chest discomfort on 5/28 associated with recurrent anterolateral ST depression and dynamic inferior T wave changes. Troponin increased from 0.5 to 0.7. Cardiology was therefore asked to reevaluation.  Interval History   No further chest pain or shortness of breath. A little apprehensive about the angiogram today.   Assessment & Plan   1.  Chest discomfort associated with EKG changes and troponin elevation. He has known CAD and a history of an abnormal stress test.   -  Coronary angiogram planned for today.  -  Continue aspirin and plavix, statin, and Toprolol. On heparin gtt.  -  Imdur started 5/29 and Toprolol dose increased.   -  Echo shows LVEF of 35 - 40% with moderate to severe inferolateral wall hypokinesis and moderate inferior wall hypokinesis.   2.  SIRS syndrome. No clear source of infection identified.   3.  Anemia. Continue to monitor.   4.  PAD.  5.  Chronic systolic congestive heart failure.   6.  Hypertension.   7.  History of CVA.  8.  Mild to moderate aortic stenosis.     Plan:  1.  Coronary angiography today. Consent signed. He has no question.  2.  Continue current medications.   3.  Consider adding lisinopril after angiogram if BPs stable.     Janis LENNY Greene PA-C    Physical Exam   Temp: 98.3  F (36.8  C) Temp src: Oral BP: 148/90 Pulse: 61 Heart Rate: 61 Resp: 18  SpO2: 95 % O2 Device: None (Room air)    Vitals:    05/27/18 0319 05/28/18 0100 05/29/18 0556 05/30/18 0316   Weight: 75.3 kg (166 lb) 76 kg (167 lb 8 oz) 74.9 kg (165 lb 1.6 oz) 73.4 kg (161 lb 12.8 oz)     Vital Signs with Ranges  Temp:  [98.3  F (36.8  C)-98.8  F (37.1  C)] 98.3  F (36.8  C)  Pulse:  [61-81] 61  Heart Rate:  [61-64] 61  Resp:  [16-18] 18  BP: (131-174)/(58-90) 148/90  SpO2:  [91 %-95 %] 95 %  05/25 0700 - 05/30 0659  In: 2074 [P.O.:1440; I.V.:634]  Out: 1450 [Urine:1450]  Net: 624    Constitutional: NAD.   Respiratory: CTAB.   Cardiovascular: RRR, s1s2, II/VI systolic murmur at the base.  GI: soft, BS+  Skin: warm, no rashes  Musculoskeletal: Moving all extremities  Neurologic: Alert, oriented x 3  Neuropsychiatric: Normal affect       Data     Recent Labs  Lab 05/30/18  0540 05/29/18  0635 05/29/18  0320 05/28/18  2310 05/28/18  0555  05/27/18  0710  05/27/18  0119 05/26/18  2035   WBC 9.1 7.5  --   --  6.5  --  7.6  --   --  9.5   HGB 11.0* 11.1*  --   --  10.5*  --  10.7*  --   --  12.0*   MCV 90 92  --   --  93  --  92  --   --  92    165  --   --  145*  --  144*  --   --  189    140  --   --  141  < > 142  --   --  141   POTASSIUM 4.2 4.9  --   --  4.9  < > 4.8  --   --  4.4   CHLORIDE 104 106  --   --  108  < > 112*  --   --  106   CO2 25 28  --   --  27  < > 25  --   --  23   BUN 12 8  --   --  11  < > 20  --   --  24   CR 0.68 0.82  --   --  0.84  < > 0.84  --   --  1.06   ANIONGAP 9 6  --   --  6  < > 5  --   --  12   ALLISON 9.0 8.8  --   --  8.4*  < > 8.0*  --   --  8.8   * 187*  --   --  188*  < > 195*  --   --  183*   ALBUMIN  --   --   --   --   --   --  2.9*  --   --  3.5   PROTTOTAL  --   --   --   --   --   --  5.8*  --   --  7.0   BILITOTAL  --   --   --   --   --   --  0.3  --   --  0.3   ALKPHOS  --   --   --   --   --   --  60  --   --  69   ALT  --   --   --   --   --   --  15  --   --  18   AST  --   --   --   --   --   --  18  --   --  20   TROPI  --   0.651* 0.707* 0.542*  --   < > 0.898*  < >  --  0.020   TROPONIN  --   --   --   --   --   --   --   --  0.00  --    < > = values in this interval not displayed.    Echocardiogram 5/27/18:  Interpretation Summary  The visual ejection fraction is estimated at 35-40%.  There is moderate to severe inferolateral wall hypokinesis.  There is moderate inferior wall hypokinesis.  The right ventricle is normal in size and function.  The left atrium is mildly dilated.  There is mild mitral stenosis.  Mild to moderate valvular aortic stenosis.  Mild aortic root dilatation.     Tele NSR with BBB    Medications     HEParin 800 Units/hr (05/30/18 0646)     - MEDICATION INSTRUCTIONS -       sodium chloride 75 mL/hr (05/30/18 0812)       aspirin  325 mg Oral Daily     atorvastatin  40 mg Oral Daily     clopidogrel  75 mg Oral Daily     insulin aspart  1-7 Units Subcutaneous TID AC     insulin aspart  1-5 Units Subcutaneous At Bedtime     isosorbide mononitrate  30 mg Oral Daily     magnesium oxide  400 mg Oral Daily     metoprolol tartrate  50 mg Oral BID     sodium chloride (PF)  3 mL Intracatheter Q8H

## 2018-05-30 NOTE — PROGRESS NOTES
[facility]    Hospitalist Progress Note    Date of Service (when I saw the patient): 05/30/2018    Assessment & Plan   Dustin Pearce is a pleasant 89 year old gentleman with extensive past medical history most notable for CAD, chronic systolic CHF, Diabetes mellitus, moderate aortic stenosis, PAD status post lower extremity angioplasties, mild left carotid artery stenosis, chronic paroxysmal afib, GERD, hypertension and dyslipidemia who presents with dizziness, nausea, vomiting and chest pain.  He was found to have hypotension, lactic acidosis, and hypoxia that was initially concerning for sepsis.  No infection has been identified and his SIRS syndrome is improving.  He did develop chest pain with elevated troponin.  Admitted now for cardiac workup and coronary angiogram.      NSTEMI   Severe three vessel CAD including  of RCA, severe prox LAD and severe prox LCx disease   Chronic systolic CHF  Troponin elevation, concern for unstable angina/NSTEMI: He has a history of remote MI. He is followed by [facility]. Most recent TTE 1/2018 showed EF 35-40% with multiple WMA's, and moderate AS and mild aortic root dilation. Recent Nuclear Stress test in 2017 reportedly showed persistent reversible inferior wall ischemia.   -- Cardiology has been consulted, input appreciated.  -- Troponin 2.083-->2.330-->1.656-->1.839  -- Echo with EF of 35-40%.  Mod - severe inferlateral wall hypokinesis, mod inf wall hypokinesis  -- Echo from 1/1/18 had EF of 35-40%, severe hypokinesis to akinesis of entire inf and mid to basal inferolateral wall.  -- Continue Plavix and aspirin.  -- Continue statin  -- Started Imdur   -- NTG IV if refractory CP  -- Resume Toprol and lisinopril pending improvement in BP  -- Patient and wife originally opted for conservative management. 5/28/18 Pt had an episode of recurrent chest pain associated with recurrent anterolateral ST depression and dynamic inferior T wave changes, which  resolved with 2 NTG SL tabs. The troponin level increased slightly overnight from 0.5 to 0.7. Previous peak troponin was 2.3 on 5/27/18. No recurrent chest pain since last night.  ---- Pt underwent coronary angiogram 5/30 and had PCI to prox LAD and prox LCx.  RCA total occlusion with L-->R collaterals.    SIRS syndrome, resolved: On presentation, Mr. Paerce was very ill appearing, actively vomiting. Initial BP 89/50, improved to 140/71 with IV fluid bolus. Hypoxic to upper 80% range on room air. He is afebrile and pulse 50-60's. CBC showed WBC 9.5, HGB 12, . Lactate is 4.8, improved to 1.3 with IV fluid. CMP unremarkable. D-Dimer mildly elevated. CT PE negative for acute pathology as is CXR. EKG shows NSR with ST segment depressions in V3-4.  -- Procalcitonin negative  -- UA is relatively bland (3 WBC). Blood cultures no growth to date, urine culture negative.   -- Was treated with IV Vancomycin and Zosyn.  -- CT abdomen and pelvis: No cause of acute pain identified in the abdomen or pelvis. Colonic diverticulosis without evidence of diverticulitis  -- Neuro checks without any concerning changes  ---Overall he appeared to have SIRS syndrome of unclear cause.  As no source of infection was found, the likelihood of true sepsis is low. He could have had viral gastroenteritis. He was hospitalized here 2/2018 for very similar presentation and found to have E faecalis UTI.  He does not seem to have a urinary tract infection at this time. His wife initially was concerned for stroke but he has no focal neurologic deficits at  time of admission making stroke less likely at present as well. His right leg drag could have been a TIA exacerbated by hypertension (note being made of hospitalization for TIA in 12/2017). Last, review of notes in Epic suggests that he has chronic orthostatic hypotension.  -- Concern his symptoms may have been due to NSTEMI as above.  -- IV fluids have been discontinued  -- Antibiotics  discontinued   -- Advance diet   -- Antiemetics as needed  -- PT/OT    Prior TIA: Plavix was added to aspirin in 12/2017.       Type 2 Diabetes mellitus:  A1c is 9.1.  - Hold Metformin and Glipizide  - ISS insulin while hospitalized.  - Follow up with PCP for PO med optimization      Anemia: Mild  - Hgb 10.7-->11.0      Chronic depression, GERD, BPH: Resume Cymbalta, PPI, Flomax upon discharge.     # Pain Assessment:  Current Pain Score 5/30/2018   Patient currently in pain? denies   Pain score (0-10) -   Pain location -   Pain descriptors -   Dustin s pain level was assessed and he currently denies pain.       DVT Prophylaxis: PCDs  Code Status: Full Code     Disposition: Discharge possibly tomorrow 6/1 pending cardiology recommendations.      Brandyn Graves    Interval History   Patient reports feeling well.  No chest pain. Denies any fever, chills, shortness of breath, abdominal pain.  No nausea or vomiting.  NPO for angiogram.  Wife present at bedside and updated.      -Data reviewed today: I reviewed all new labs and imaging results over the last 24 hours.     Physical Exam   Temp: 98.3  F (36.8  C) Temp src: Oral BP: 148/90 Pulse: 67 Heart Rate: 61 Resp: 18 SpO2: 95 % O2 Device: None (Room air)    Vitals:    05/28/18 0100 05/29/18 0556 05/30/18 0316   Weight: 76 kg (167 lb 8 oz) 74.9 kg (165 lb 1.6 oz) 73.4 kg (161 lb 12.8 oz)     Vital Signs with Ranges  Temp:  [97.9  F (36.6  C)-98.8  F (37.1  C)] 98.3  F (36.8  C)  Pulse:  [67-81] 67  Heart Rate:  [61-69] 61  Resp:  [16-18] 18  BP: (131-174)/(58-90) 148/90  SpO2:  [91 %-95 %] 95 %  I/O last 3 completed shifts:  In: 787 [P.O.:600; I.V.:187]  Out: 200 [Urine:200]    Constitutional: Alert, oriented to person, place, situation.  Cooperative, lying in bed in NAD.   Respiratory:  Lungs CTAB.  No crackles, wheezes, or rhonchi, no labored breathing.  Cardiovascular:  Heart RRR, grade 2/6 systolic murmur at apex, no edema.  GI:  Abdomen soft, NT/ND and with  normoactive BS  Skin/Integumen:  Warm, dry, non-diaphoretic.  MSK: CMS x4 intact.    Medications     HEParin 800 Units/hr (05/30/18 0646)     - MEDICATION INSTRUCTIONS -       sodium chloride         aspirin  325 mg Oral Daily     atorvastatin  40 mg Oral Daily     clopidogrel  75 mg Oral Daily     insulin aspart  1-7 Units Subcutaneous TID AC     insulin aspart  1-5 Units Subcutaneous At Bedtime     isosorbide mononitrate  30 mg Oral Daily     magnesium oxide  400 mg Oral Daily     metoprolol tartrate  50 mg Oral BID     sodium chloride (PF)  3 mL Intracatheter Q8H       Data     Recent Labs  Lab 05/30/18  0540 05/29/18  0635 05/29/18  0320 05/28/18  2310 05/28/18  0555  05/27/18  0710  05/27/18  0119 05/26/18 2035   WBC 9.1 7.5  --   --  6.5  --  7.6  --   --  9.5   HGB 11.0* 11.1*  --   --  10.5*  --  10.7*  --   --  12.0*   MCV 90 92  --   --  93  --  92  --   --  92    165  --   --  145*  --  144*  --   --  189    140  --   --  141  < > 142  --   --  141   POTASSIUM 4.2 4.9  --   --  4.9  < > 4.8  --   --  4.4   CHLORIDE 104 106  --   --  108  < > 112*  --   --  106   CO2 25 28  --   --  27  < > 25  --   --  23   BUN 12 8  --   --  11  < > 20  --   --  24   CR 0.68 0.82  --   --  0.84  < > 0.84  --   --  1.06   ANIONGAP 9 6  --   --  6  < > 5  --   --  12   ALLISON 9.0 8.8  --   --  8.4*  < > 8.0*  --   --  8.8   * 187*  --   --  188*  < > 195*  --   --  183*   ALBUMIN  --   --   --   --   --   --  2.9*  --   --  3.5   PROTTOTAL  --   --   --   --   --   --  5.8*  --   --  7.0   BILITOTAL  --   --   --   --   --   --  0.3  --   --  0.3   ALKPHOS  --   --   --   --   --   --  60  --   --  69   ALT  --   --   --   --   --   --  15  --   --  18   AST  --   --   --   --   --   --  18  --   --  20   TROPI  --  0.651* 0.707* 0.542*  --   < > 0.898*  < >  --  0.020   TROPONIN  --   --   --   --   --   --   --   --  0.00  --    < > = values in this interval not displayed.    No results found for this  or any previous visit (from the past 24 hour(s)).

## 2018-05-30 NOTE — PLAN OF CARE
Problem: Patient Care Overview  Goal: Plan of Care/Patient Progress Review  Outcome: No Change  A&O, VSS on RA. Denies pain. Tele: NSR. NPO since midnight. Up w/assist x1. Neuros intact. Heparin gtt infusing @ 800 units/hr.

## 2018-05-31 ENCOUNTER — APPOINTMENT (OUTPATIENT)
Dept: PHYSICAL THERAPY | Facility: CLINIC | Age: 83
DRG: 247 | End: 2018-05-31
Attending: INTERNAL MEDICINE
Payer: MEDICARE

## 2018-05-31 VITALS
BODY MASS INDEX: 25.91 KG/M2 | HEART RATE: 66 BPM | SYSTOLIC BLOOD PRESSURE: 124 MMHG | DIASTOLIC BLOOD PRESSURE: 51 MMHG | OXYGEN SATURATION: 94 % | WEIGHT: 161.2 LBS | RESPIRATION RATE: 18 BRPM | HEIGHT: 66 IN | TEMPERATURE: 98.3 F

## 2018-05-31 LAB
ANION GAP SERPL CALCULATED.3IONS-SCNC: 4 MMOL/L (ref 3–14)
BUN SERPL-MCNC: 10 MG/DL (ref 7–30)
CALCIUM SERPL-MCNC: 8.6 MG/DL (ref 8.5–10.1)
CHLORIDE SERPL-SCNC: 105 MMOL/L (ref 94–109)
CO2 SERPL-SCNC: 30 MMOL/L (ref 20–32)
CREAT SERPL-MCNC: 0.81 MG/DL (ref 0.66–1.25)
ERYTHROCYTE [DISTWIDTH] IN BLOOD BY AUTOMATED COUNT: 14.5 % (ref 10–15)
GFR SERPL CREATININE-BSD FRML MDRD: 89 ML/MIN/1.7M2
GLUCOSE BLDC GLUCOMTR-MCNC: 173 MG/DL (ref 70–99)
GLUCOSE BLDC GLUCOMTR-MCNC: 189 MG/DL (ref 70–99)
GLUCOSE SERPL-MCNC: 200 MG/DL (ref 70–99)
HCT VFR BLD AUTO: 33.1 % (ref 40–53)
HGB BLD-MCNC: 10.5 G/DL (ref 13.3–17.7)
INTERPRETATION ECG - MUSE: NORMAL
MCH RBC QN AUTO: 28.9 PG (ref 26.5–33)
MCHC RBC AUTO-ENTMCNC: 31.7 G/DL (ref 31.5–36.5)
MCV RBC AUTO: 91 FL (ref 78–100)
PLATELET # BLD AUTO: 148 10E9/L (ref 150–450)
POTASSIUM SERPL-SCNC: 4.3 MMOL/L (ref 3.4–5.3)
RBC # BLD AUTO: 3.63 10E12/L (ref 4.4–5.9)
SODIUM SERPL-SCNC: 139 MMOL/L (ref 133–144)
WBC # BLD AUTO: 8.5 10E9/L (ref 4–11)

## 2018-05-31 PROCEDURE — 25000132 ZZH RX MED GY IP 250 OP 250 PS 637: Mod: GY | Performed by: INTERNAL MEDICINE

## 2018-05-31 PROCEDURE — 36415 COLL VENOUS BLD VENIPUNCTURE: CPT | Performed by: INTERNAL MEDICINE

## 2018-05-31 PROCEDURE — A9270 NON-COVERED ITEM OR SERVICE: HCPCS | Mod: GY | Performed by: INTERNAL MEDICINE

## 2018-05-31 PROCEDURE — 40000193 ZZH STATISTIC PT WARD VISIT: Performed by: PHYSICAL THERAPIST

## 2018-05-31 PROCEDURE — 97110 THERAPEUTIC EXERCISES: CPT | Mod: GP | Performed by: PHYSICAL THERAPIST

## 2018-05-31 PROCEDURE — 00000146 ZZHCL STATISTIC GLUCOSE BY METER IP

## 2018-05-31 PROCEDURE — 99239 HOSP IP/OBS DSCHRG MGMT >30: CPT | Performed by: PHYSICIAN ASSISTANT

## 2018-05-31 PROCEDURE — 99232 SBSQ HOSP IP/OBS MODERATE 35: CPT | Performed by: INTERNAL MEDICINE

## 2018-05-31 PROCEDURE — 85027 COMPLETE CBC AUTOMATED: CPT | Performed by: INTERNAL MEDICINE

## 2018-05-31 PROCEDURE — 80048 BASIC METABOLIC PNL TOTAL CA: CPT | Performed by: INTERNAL MEDICINE

## 2018-05-31 PROCEDURE — 97161 PT EVAL LOW COMPLEX 20 MIN: CPT | Mod: GP | Performed by: PHYSICAL THERAPIST

## 2018-05-31 RX ORDER — ISOSORBIDE MONONITRATE 30 MG/1
30 TABLET, EXTENDED RELEASE ORAL DAILY
Qty: 30 TABLET | Refills: 1 | Status: ON HOLD | OUTPATIENT
Start: 2018-06-01 | End: 2018-06-03

## 2018-05-31 RX ORDER — METOPROLOL TARTRATE 50 MG
50 TABLET ORAL 2 TIMES DAILY
Qty: 60 TABLET | Refills: 1 | Status: SHIPPED | OUTPATIENT
Start: 2018-05-31 | End: 2018-06-12

## 2018-05-31 RX ORDER — NITROGLYCERIN 0.4 MG/1
TABLET SUBLINGUAL
Qty: 25 TABLET | Refills: 0 | Status: SHIPPED | OUTPATIENT
Start: 2018-05-31

## 2018-05-31 RX ORDER — LISINOPRIL 5 MG/1
5 TABLET ORAL DAILY
Qty: 30 TABLET | Refills: 1 | Status: ON HOLD | OUTPATIENT
Start: 2018-06-01 | End: 2019-07-01

## 2018-05-31 RX ADMIN — ISOSORBIDE MONONITRATE 30 MG: 30 TABLET, EXTENDED RELEASE ORAL at 08:25

## 2018-05-31 RX ADMIN — LISINOPRIL 5 MG: 5 TABLET ORAL at 08:25

## 2018-05-31 RX ADMIN — ASPIRIN 81 MG: 81 TABLET, COATED ORAL at 08:22

## 2018-05-31 RX ADMIN — Medication 400 MG: at 08:22

## 2018-05-31 RX ADMIN — ATORVASTATIN CALCIUM 40 MG: 40 TABLET, FILM COATED ORAL at 08:22

## 2018-05-31 RX ADMIN — METOPROLOL TARTRATE 50 MG: 50 TABLET ORAL at 10:56

## 2018-05-31 RX ADMIN — CLOPIDOGREL 75 MG: 75 TABLET, FILM COATED ORAL at 08:22

## 2018-05-31 ASSESSMENT — VISUAL ACUITY
OU: NORMAL ACUITY;GLASSES
OU: NORMAL ACUITY;GLASSES

## 2018-05-31 NOTE — PROGRESS NOTES
NSG DISCHARGE NOTE    Patient discharged to home at 5:04 PM via wheel chair. Accompanied by spouse and staff. Discharge instructions reviewed with patient and spouse, opportunity offered to ask questions. Prescriptions filled and sent with patient upon discharge. All belongings sent with patient.    Rudy Nye

## 2018-05-31 NOTE — PLAN OF CARE
"Problem: Patient Care Overview  Goal: Plan of Care/Patient Progress Review  CR: CR orders received, evaluation completed, treatment initiated. Pt is a 89yo male admitted 5/26 for evaluation of dizziness, nausea and vomiting; initially managed conservatively d/t lack of recurrent symptoms until 5/28 when pt had recurrent chest pain associated with recurrent anterolateral ST depression and dynamic inferior T wave changes; POD#1 s/p MODESTA to proximal LAD and proximal L Cx. PMH significant for chronic systolic CHF, moderate AS, DM, PA s/p LE angioplasty/stenting,CVA, paroxysmal afib, HTN, dyslipidemia. Pt lives with his wife in a house with 3 steps to enter, 7 steps to main living area. Pt reports Mod I with SEC for functional mobility. Pt's spouse provides transportation as pt does not drive.    Discharge Planner PT   Patient plan for discharge: Home, pt hopeful to disch today  Current status: Noting pt with run of NSVT just prior to session, pt asymptomatic, Dr. Emerson at bedside to assess, OK for CR per Dr. Emerson. Pt demonstrates IND with bed mobility, SBA for sit<>Stand transfers with SEC. Pt tolerated 15min ambulation at moderate pace with SEC and stairs with stable CV response except hypotensive response to activity. Initiated CR education including aerobic activity guidelines, symptoms of activity intolerance, personal CV risk factors, and Phase II OP CR.   Barriers to return to prior living situation: Decreased activity tolerance  Recommendations for discharge: Home with Phase II OP CR  Rationale for recommendations: Pt demonstrates safe mobility and adequate activity tolerance for return home with use of SEC for mobility (baseline) and assist from spouse for IADLs as needed. Pt would benefit from Phase II OP CR to safely progress activity tolerance with monitored exercise and continued cardiac health education.     Pt's spouse reports concern over ability to transport pt to \"all of these appointments\" stating she " has been running out of vacation time at work to provide transport for pt.        Entered by: Lara Arana 05/31/2018 3:04 PM         Physical Therapy Discharge Summary    Reason for therapy discharge:    Discharged to home with outpatient therapy.    Progress towards therapy goal(s). See goals on Care Plan in [facility] electronic health record for goal details.  Goals not met.  Barriers to achieving goals:   discharge on same date as initial evaluation.    Therapy recommendation(s):    Continued therapy is recommended.  Rationale/Recommendations:  Pt would benefit from Phase II OP CR to safely progress activity tolerance with monitored exercise and continued cardiac health education.

## 2018-05-31 NOTE — PROGRESS NOTES
05/31/18 1345   Quick Adds   Type of Visit Initial PT Evaluation   Living Environment   Lives With spouse   Living Arrangements house   Home Accessibility bed and bath are not on the first floor;grab bars present (bathtub);grab bars present (toilet);stairs to enter home;stairs within home;stairs (2 railings present);tub/shower is not walk in   Number of Stairs to Enter Home 3   Number of Stairs Within Home 7   Stair Railings at Home outside, present at both sides;inside, present on right side   Transportation Available family or friend will provide  (pt relies on spouse for driving)   Self-Care   Dominant Hand right   Usual Activity Tolerance good   Current Activity Tolerance moderate   Regular Exercise yes   Activity/Exercise Type strength training  (theraband exercises)   Exercise Amount/Frequency 2 times/wk   Equipment Currently Used at Home cane, straight;grab bar;shower chair;raised toilet   Activity/Exercise/Self-Care Comment Pt reports IND with ADLs, spouse assists with IADLs   Functional Level Prior   Ambulation 1-->assistive equipment   Transferring 0-->independent   Toileting 0-->independent   Bathing 1-->assistive equipment   Dressing 0-->independent   Eating 0-->independent   Communication 0-->understands/communicates without difficulty   Swallowing 0-->swallows foods/liquids without difficulty   Cognition 1 - attention or memory deficits   Fall history within last six months yes   Number of times patient has fallen within last six months 2   Which of the above functional risks had a recent onset or change? (functional activity tolerance)   Prior Functional Level Comment Pt reports Mod I with SEC for functional mobility. Pt's spouse provides transportation as pt does not drive.   General Information   Onset of Illness/Injury or Date of Surgery - Date 05/30/18   Referring Physician Khanh Mukherjee MD   Patient/Family Goals Statement To go home   Pertinent History of Current Problem (include personal  factors and/or comorbidities that impact the POC) Pt is a 89yo male admitted 5/26 for evaluation of dizziness, nausea and vomiting; initially managed conservatively d/t lack of recurrent symptoms until 5/28 when pt had recurrent chest pain associated with recurrent anterolateral ST depression and dynamic inferior T wave changes; POD#1 s/p MODESTA to proximal LAD and proximal L Cx. PMH significant for chronic systolic CHF, moderate AS, DM, PA s/p LE angioplasty/stenting,CVA, paroxysmal afib, HTN, dyslipidemia.    Precautions/Limitations fall precautions  (L radial and R femoral precautions)   General Observations Pt sitting up in chair, Dr. Emerson at bedside to assess after tele showed run of vtach. Per Dr. Emerson, OK for CR   General Info Comments Activity: Ambulate   Cognitive Status Examination   Orientation orientation to person, place and time   Level of Consciousness alert   Follows Commands and Answers Questions 100% of the time;able to follow single-step instructions   Personal Safety and Judgment intact   Pain Assessment   Patient Currently in Pain No   Integumentary/Edema   Integumentary/Edema Comments very [place]   Posture    Posture Forward head position;Protracted shoulders   Range of Motion (ROM)   ROM Comment BLE WNL with AROM   Strength   Strength Comments BLE 4/5 globally, no focal deficits identified between R and L   Bed Mobility   Bed Mobility Comments IND supine<>sit   Transfer Skills   Transfer Comments SBA sit>stand wiht SEC   Gait   Gait Comments SBA 10' with SEC, steady, 2 pt gait pattern   Balance   Balance Comments Appears steady on feet with use of SEC   Sensory Examination   Sensory Perception Comments Denies N/T   General Therapy Interventions   Planned Therapy Interventions gait training;ROM;strengthening;stretching;transfer training;risk factor education;home program guidelines;progressive activity/exercise   Clinical Impression   Criteria for Skilled Therapeutic Intervention yes, treatment  "indicated   PT Diagnosis Impaired activity tolerance   Influenced by the following impairments Decreased functional activity tolerance s/p stent x2, weakness, SOB   Functional limitations due to impairments Decreased IND and tolerance for functional mobility   Clinical Presentation Evolving/Changing   Clinical Presentation Rationale recent run of vtach on tele; pt asymptomatic   Clinical Decision Making (Complexity) Low complexity   Therapy Frequency` 2 times/day   Predicted Duration of Therapy Intervention (days/wks) 3 days   Anticipated Discharge Disposition Home with Outpatient Therapy   Risk & Benefits of therapy have been explained Yes   Patient, Family & other staff in agreement with plan of care Yes   [facility] [employer] TM \"6 Clicks\"   2016, Trustees of [facility], under license to [employer].  All rights reserved.   6 Clicks Short Forms Basic Mobility Inpatient Short Form   [facility] AM-PAC  \"6 Clicks\" V.2 Basic Mobility Inpatient Short Form   1. Turning from your back to your side while in a flat bed without using bedrails? 4 - None   2. Moving from lying on your back to sitting on the side of a flat bed without using bedrails? 4 - None   3. Moving to and from a bed to a chair (including a wheelchair)? 3 - A Little   4. Standing up from a chair using your arms (e.g., wheelchair, or bedside chair)? 3 - A Little   5. To walk in hospital room? 3 - A Little   6. Climbing 3-5 steps with a railing? 3 - A Little   Basic Mobility Raw Score (Score out of 24.Lower scores equate to lower levels of function) 20   Total Evaluation Time   Total Evaluation Time (Minutes) 10     "

## 2018-05-31 NOTE — PLAN OF CARE
Problem: Cardiac: ACS (Acute Coronary Syndrome) (Adult)  Goal: Signs and Symptoms of Listed Potential Problems Will be Absent, Minimized or Managed (Cardiac: ACS)  Signs and symptoms of listed potential problems will be absent, minimized or managed by discharge/transition of care (reference Cardiac: ACS (Acute Coronary Syndrome) (Adult) CPG).   Outcome: Improving  Patient had uneventful night and appeared to sleep well.  Denies pain.  VSS, although BP's were somewhat hard on the evening shift.  Right femoral and Left radial sites C/D/I.  Patient up to BR and appear to have trouble with starting to walk.  Patient dragging right leg, which has been mentioned in previous notes.  Although when questioned, patient states he drags his left leg.  Oxygen via NC stopped at 0400 to see how patient does with saturations.

## 2018-05-31 NOTE — PLAN OF CARE
Problem: Cardiac: ACS (Acute Coronary Syndrome) (Adult)  Goal: Signs and Symptoms of Listed Potential Problems Will be Absent, Minimized or Managed (Cardiac: ACS)  Signs and symptoms of listed potential problems will be absent, minimized or managed by discharge/transition of care (reference Cardiac: ACS (Acute Coronary Syndrome) (Adult) CPG).   Outcome: No Change  Angio today with intervention to the PLad & LCX, had left radial and Right femoral approach after failed attempt to access Aorta from radial. Right femoral is angiosealed and stable, Left radial band aid on and stable, no bleeding or hematoma noted. SB on the monitor, no c/o pain, 2L nc, tried to wean off O2 but pt dessats to 89% or less.

## 2018-05-31 NOTE — DISCHARGE SUMMARY
[facility]    Discharge Summary  Hospitalist    Date of Admission:  5/26/2018  Date of Discharge:  5/31/2018  Discharging Provider: Brandyn Graves  Date of Service (when I saw the patient): 05/31/18    Discharge Diagnoses   NSTEMI   Severe three vessel CAD including  of RCA, severe prox LAD and severe prox LCx disease  S/p PCI to prox LAD and prox LCx  Chronic systolic CHF  SIRS syndrome, resolved  Prior TIA  Type 2 Diabetes mellitus  Anemia  Chronic depression  GERD  BPH    History of Present Illness   Dustin Pearce is a markedly pleasant 89 year old gentleman with extensive past medical history most notable for CAD, chronic systolic CHF, Diabetes mellitus, moderate aortic stenosis, PAD status post lower extremity angioplasties, mild left carotid artery stenosis, chronic paroxysmal afib, GERD, hypertension and dyslipidemia who presented on 5/26/2018 with dizziness, nausea and vomiting and chest pain and was found to have hypotension, lactic acidosis, and hypoxia.  There was initial concern for severe sepsis of unclear etiology vs possible ACS.  He was admitted for further workup and management.    Please refer to the detailed history and physical from Dr. Amilcar Sharma on 5/27/2018 for more information    Hospital Course   NSTEMI   Severe three vessel CAD including  of RCA, severe prox LAD and severe prox LCx disease   Chronic systolic CHF  Troponin elevation, concern for unstable angina/NSTEMI: He has a history of remote MI. He is followed by [facility]. Most recent TTE 1/2018 showed EF 35-40% with multiple WMA's, and moderate AS and mild aortic root dilation. Recent Nuclear Stress test in 2017 reportedly showed persistent reversible inferior wall ischemia.   -- Cardiology has been consulted, input appreciated.  -- Troponin 2.083-->2.330-->1.656-->1.839  -- Echo with EF of 35-40%.  Mod - severe inferlateral wall hypokinesis, mod inf wall hypokinesis  -- Echo from 1/1/18 had EF  of 35-40%, severe hypokinesis to akinesis of entire inf and mid to basal inferolateral wall.  -- Continue Plavix and aspirin.  -- Continue statin  -- Started Imdur 30 mg daily  -- Toprol and lisinopril increased due to elevated BP.  -- Patient and wife originally opted for conservative management. 5/28/18 Pt had an episode of recurrent chest pain associated with recurrent anterolateral ST depression and dynamic inferior T wave changes, which resolved with 2 NTG SL tabs. The troponin level increased slightly overnight from 0.5 to 0.7. Previous peak troponin was 2.3 on 5/27/18. No recurrent chest pain since then.  ---- Pt underwent coronary angiogram 5/30 and had PCI to prox LAD and prox LCx.  RCA total occlusion with L-->R collaterals. Mild to moderate diffuse disease remains. Very small distal OM branch occluded with collaterals.   -- One episode of NSVT noted this afternoon, asymptomatic. Per cards, his EF does not justify implantation of an ICD for primary prevention, even with episodes of NSVT.  --Doing well after stenting, okay for home per cardiology.  --Outpatient cardiac follow-up and outpatient cardiac rehab.    SIRS syndrome, resolved: On presentation, Mr. Pearce was very ill appearing, actively vomiting. Initial BP 89/50, improved to 140/71 with IV fluid bolus. Hypoxic to upper 80% range on room air. He is afebrile and pulse 50-60's. CBC showed WBC 9.5, HGB 12, . Lactate 4.8, improved to 1.3 with IV fluid. CMP unremarkable. D-Dimer mildly elevated. CT PE negative for acute pathology as is CXR. EKG shows NSR with ST segment depressions in V3-4.  -- Procalcitonin negative  -- UA is relatively bland (3 WBC). Blood cultures no growth to date, urine culture negative.   -- Was initially treated with IV Vancomycin and Zosyn.  -- CT abdomen and pelvis: No cause of acute pain identified in the abdomen or pelvis. Colonic diverticulosis without evidence of diverticulitis    Overall he appeared to have SIRS  syndrome of unclear cause.  As no source of infection was found, the likelihood of true sepsis is low. He could have had viral gastroenteritis. He was hospitalized here 2/2018 for very similar presentation and found to have E faecalis UTI.  He does not seem to have a urinary tract infection at this time. His wife was initially was concerned for stroke but he has no focal neurologic deficits at time of admission making stroke less likely. His right leg drag could have been a TIA exacerbated by hypertension (note being made of hospitalization for TIA in 12/2017). Last, review of notes in Epic suggests that he has chronic orthostatic hypotension.  -- At this point it seems most likely that his symptoms may have been due to NSTEMI as above.  -- IV fluids have been discontinued  -- Antibiotics discontinued   -- Advance diet   -- PT/OT    Prior TIA: Plavix was added to aspirin in 12/2017.   -Patient's wife reports right leg weakness and difficulty with ambulation that she noticed on the day of presentation.  She is unsure if he had previous right leg weakness related to his prior TIA/CVA.  -Noncontrast CT head in the ED is negative  -Patient does not seem to have significant deficits on my exam.  -He ambulated with physical therapy with minimal difficulty and was recommended for outpatient cardiac rehab      Type 2 Diabetes mellitus:  A1c is 9.1.  - Hold Metformin and Glipizide, plan to resume on discharge  - ISS insulin used while hospitalized.  - Follow up with PCP for PO med optimization      Anemia: Mild  - Hgb 10.7-->11.0      Chronic depression, GERD, BPH: Resume Cymbalta, PPI, Flomax upon discharge.     # Discharge Pain Plan:   - Patient currently has NO PAIN and is not being prescribed pain medications on discharge.    Brandyn Graves PA-C      Physical Exam  Temp: 98.6  F (37  C) Temp src: Oral BP: 133/65 Pulse: 61 Heart Rate: 56 Resp: 18 SpO2: 96 % O2 Device: Nasal cannula Oxygen Delivery: 2 LPM         Vitals:     05/29/18 0556 05/30/18 0316 05/31/18 0400   Weight: 74.9 kg (165 lb 1.6 oz) 73.4 kg (161 lb 12.8 oz) 73.1 kg (161 lb 3.2 oz)      Constitutional:  Alert, oriented to person, place, situation.  Cooperative, sitting up in the chair in NAD.   Respiratory:  Lungs CTAB.  No crackles, wheezes, or rhonchi, no labored breathing.  Cardiovascular:  Heart RRR, grade 2/6 systolic murmur at apex, no edema.  GI:  Abdomen soft, NT/ND and with normoactive BS  Skin/Integumen:  Warm, dry, non-diaphoretic.  MSK: CMS x4 intact.  Neuro: Strength 5 out of 5 in all 4 extremities, cranial nerves II through XII grossly intact    Significant Results and Procedures   Coronary angiogram S/p PCI to prox LAD and prox LCx (5/30/2018)  Echo with EF of 35-40%.  Mod - severe inferlateral wall hypokinesis, mod inf wall hypokinesis    Pending Results   Blood cultures ×2 obtained on 5/26/2018 are no growth to date, final result pending    Code Status   Full Code       Primary Care Physician   Matt Morrissey     Discharge Disposition   Discharged to home  Condition at discharge: Stable    Consultations This Hospital Stay   Cardiology - Dr. Benoit Emerson    Time Spent on this Encounter   I, Brandyn Graves, personally saw the patient today and spent greater than 30 minutes discharging this patient.    Discharge Orders     CARDIAC REHAB REFERRAL     Reason for your hospital stay   3 vessel coronary artery disease s/p stent to LAD and left circumflex     Follow-up and recommended labs and tests    Follow up appointment with CardiologyCatherine APRN on 6/8/18 at 12:30 pm. Also scheduled an appointment with Dr. Samuel on 8/31/18 at 10:45 pm.  Follow up within 7 days with your primary care provider for post-hospitalization check up.     Activity   Your activity upon discharge: activity as tolerated     Discharge Instructions   Do not restart metformin until 6/1     Full Code     Diet   Follow this diet upon discharge:     Combination Diet  [id number] Calories: Moderate Consistent CHO (4-6 CHO units/meal)       Discharge Medications   Current Discharge Medication List      START taking these medications    Details   isosorbide mononitrate (IMDUR) 30 MG 24 hr tablet Take 1 tablet (30 mg) by mouth daily  Qty: 30 tablet, Refills: 1    Associated Diagnoses: Coronary artery disease involving native heart, angina presence unspecified, unspecified vessel or lesion type      metoprolol tartrate (LOPRESSOR) 50 MG tablet Take 1 tablet (50 mg) by mouth 2 times daily  Qty: 60 tablet, Refills: 1    Associated Diagnoses: Coronary artery disease involving native heart, angina presence unspecified, unspecified vessel or lesion type      nitroGLYcerin (NITROSTAT) 0.4 MG sublingual tablet For chest pain place 1 tablet under the tongue every 5 minutes for 3 doses. If symptoms persist 5 minutes after 1st dose call 911.  Qty: 25 tablet, Refills: 0    Associated Diagnoses: Coronary artery disease involving native heart, angina presence unspecified, unspecified vessel or lesion type         CONTINUE these medications which have CHANGED    Details   lisinopril (PRINIVIL/ZESTRIL) 5 MG tablet Take 1 tablet (5 mg) by mouth daily  Qty: 30 tablet, Refills: 1    Associated Diagnoses: Coronary artery disease involving native heart, angina presence unspecified, unspecified vessel or lesion type         CONTINUE these medications which have NOT CHANGED    Details   AMITRIPTYLINE HCL PO Take 25 mg by mouth nightly as needed for sleep      aspirin EC 81 MG EC tablet Take 1 tablet (81 mg) by mouth daily  Qty: 30 tablet, Refills: 0    Associated Diagnoses: Transient cerebral ischemia, unspecified type      ATORVASTATIN CALCIUM PO Take 40 mg by mouth At Bedtime       bisacodyl (DULCOLAX) 10 MG Suppository Place 10 mg rectally daily as needed for constipation      Cholecalciferol 65311 UNITS TABS Take 1 tablet by mouth three times a week       Clopidogrel Bisulfate, [id number], (PLAVIX PO)  "Take 75 mg by mouth daily     Associated Diagnoses: Cough      DULoxetine (CYMBALTA) 30 MG EC capsule Take 1 capsule (30 mg) by mouth daily  Qty: 90 capsule, Refills: 3    Associated Diagnoses: Polyneuropathy associated with underlying disease (H)      glipiZIDE (GLUCOTROL) 10 MG tablet Take 1 tablet (10 mg) by mouth 2 times daily (before meals)  Qty: 180 tablet, Refills: 3    Associated Diagnoses: Type 2 diabetes mellitus with diabetic peripheral angiopathy without gangrene, without long-term current use of insulin (H)      magnesium oxide (MAG-OX) 400 (241.3 Mg) MG tablet TAKE 1 TABLET BY MOUTH TWICE DAILY  Qty: 60 tablet, Refills: 0    Associated Diagnoses: Constipation, unspecified constipation type      METFORMIN HCL PO Take 1,000 mg by mouth 2 times daily (with meals)      OMEPRAZOLE PO Take 40 mg by mouth 2 times daily       polyethylene glycol (MIRALAX/GLYCOLAX) Packet Take 17 g by mouth daily as needed for constipation      tamsulosin (FLOMAX) 0.4 MG capsule Take 1 capsule (0.4 mg) by mouth daily  Qty: 90 capsule, Refills: 3    Associated Diagnoses: Benign non-nodular prostatic hyperplasia with lower urinary tract symptoms      ipratropium (ATROVENT) 0.03 % spray INHALE 1 SPRAY IN EACH NOSTRIL EVERY 12 HOURS AS NEEDED  Qty: 30 mL, Refills: 0    Associated Diagnoses: Runny nose      order for DME Equipment being ordered: True Matrix Blood Glucose meter.  Qty: 1 Device, Refills: 0    Associated Diagnoses: Type 2 diabetes mellitus without complication (H)      TRUE METRIX BLOOD GLUCOSE TEST test strip USE TO TEST BLOOD SUGAR THREE TIMES DAILY OR AS DIRECTED  Qty: 300 strip, Refills: 0    Associated Diagnoses: Hypoglycemia         STOP taking these medications       metoprolol (TOPROL-XL) 25 MG 24 hr tablet Comments:   Reason for Stopping:             Allergies   Allergies   Allergen Reactions     Oxycodone Other (See Comments)     \"TERRIBLE SWEATING\"     Sulfa Drugs      Tetracycline      Data   Most Recent 3 " CBC's:  Recent Labs   Lab Test  05/31/18   0600  05/30/18   0540  05/29/18   0635   WBC  8.5  9.1  7.5   HGB  10.5*  11.0*  11.1*   MCV  91  90  92   PLT  148*  166  165      Most Recent 3 BMP's:  Recent Labs   Lab Test  05/31/18   0600  05/30/18   0540  05/29/18   0635   NA  139  138  140   POTASSIUM  4.3  4.2  4.9   CHLORIDE  105  104  106   CO2  30  25  28   BUN  10  12  8   CR  0.81  0.68  0.82   ANIONGAP  4  9  6   ALLISON  8.6  9.0  8.8   GLC  200*  211*  187*     Most Recent 2 LFT's:  Recent Labs   Lab Test  05/27/18   0710  05/26/18   2035   AST  18  20   ALT  15  18   ALKPHOS  60  69   BILITOTAL  0.3  0.3     Most Recent INR's and Anticoagulation Dosing History:  Anticoagulation Dose History     Recent Dosing and Labs Latest Ref Rng & Units 8/19/2009 1/20/2017 12/31/2017    INR 0.86 - 1.14 0.99 1.03 0.99        Most Recent 3 Troponin's:  Recent Labs   Lab Test  05/29/18   0635  05/29/18   0320  05/28/18   2310   05/27/18   0119   TROPI  0.651*  0.707*  0.542*   < >   --    TROPONIN   --    --    --    --   0.00    < > = values in this interval not displayed.     Most Recent Cholesterol Panel:  Recent Labs   Lab Test  12/31/17   1425   CHOL  133   LDL  61   HDL  43   TRIG  147     Most Recent 6 Bacteria Isolates From Any Culture (See EPIC Reports for Culture Details):  Recent Labs   Lab Test  05/26/18   2246  05/26/18   2135  03/12/18   1454  02/08/18   0218  06/06/17   1846  06/04/17   1027   CULT  No growth  No growth after 5 days  No growth after 5 days  >100,000 colonies/mL  Enterococcus faecalis  *  >100,000 colonies/mL  Enterococcus faecalis  *  >100,000 colonies/mL  Strain 2  Enterococcus faecalis  *  No anaerobes isolated  No growth  No anaerobes isolated  Moderate growth Staphylococcus aureus*     Most Recent TSH, T4 and A1c Labs:  Recent Labs   Lab Test  05/27/18   0710   12/31/17   1425   10/20/16   1825   TSH   --    --   2.16   < >  4.63*   T4   --    --    --    --   0.94   A1C  9.1*   < >   8.6*   < >  8.5*    < > = values in this interval not displayed.     Results for orders placed or performed during the hospital encounter of 05/26/18   CT Head w/o Contrast    Narrative    CT OF THE HEAD WITHOUT CONTRAST 5/26/2018 9:24 PM     COMPARISON: Brain MR 1/1/2018.    HISTORY: Dizziness tonight, on thinners, syncope.      TECHNIQUE: 5 mm thick axial CT images of the head were acquired  without IV contrast material.    FINDINGS: There is moderate diffuse cerebral volume loss. There are  subtle patchy areas of decreased density in the cerebral white matter  bilaterally that are consistent with sequela of chronic small vessel  ischemic disease. A moderate sized left frontal ischemic infarct is  again noted without change.    The ventricles and basal cisterns are within normal limits in  configuration given the degree of cerebral volume loss.  There is no  midline shift. There are no extra-axial fluid collections.    No intracranial hemorrhage, mass or recent infarct.    The visualized paranasal sinuses are well-aerated. There is no  mastoiditis. There are no fractures of the visualized bones.      Impression    IMPRESSION: Diffuse cerebral volume loss and cerebral white matter  changes consistent with chronic small vessel ischemic disease. No  evidence for acute intracranial pathology.    Radiation dose for this scan was reduced using automated exposure  control, adjustment of the mA and/or kV according to patient size, or  iterative reconstruction technique.    LISANDRO MAGALLON MD   Chest XR,  PA & LAT    Narrative    CHEST TWO VIEWS    5/26/2018 10:52 PM     COMPARISON: Frontal chest x-ray 8/19/2009.    HISTORY: Hypoxia.    FINDINGS: The cardiac silhouette, pulmonary vasculature, lungs and  pleural spaces are within normal limits.      Impression    IMPRESSION: Clear lungs.    LISANDRO MAGALLON MD   Chest CT, IV contrast only - PE protocol    Narrative    CT CHEST WITH CONTRAST   5/26/2018 11:50 PM     HISTORY:  Hypoxia. Dizziness.    COMPARISON: 2/8/2018 - CT abdomen and pelvis.    TECHNIQUE: Following the uneventful administration of 64 mL Isovue-370  intravenous contrast, helical sections were acquired through the lungs  according to the pulmonary embolism protocol. Coronal reconstructions  were generated. Radiation dose for this scan was reduced using  automated exposure control, adjustment of the mA and/or kV according  to the patient's size, or iterative reconstruction technique.    FINDINGS: No visualized pulmonary embolus. The thoracic aorta is  normal in caliber without dissection. Atherosclerotic calcification in  the thoracic aorta and coronary arteries.    Minimal emphysematous changes in the lungs. A band-like opacity in the  lateral aspect of the mid left lung likely represents atelectasis or  scarring. The lungs are otherwise clear. No pleural or pericardial  effusion. No enlarged lymph nodes in the chest. Small hiatal hernia.    Scan through the upper abdomen is significant for a few nonobstructing  calculi in both kidneys, largest a 0.6 cm right renal calculus, and a  4.6 cm cyst containing a thin internal septation in the upper pole of  the left kidney.      Impression    IMPRESSION:   1. No visualized pulmonary embolus.  2. No convincing evidence of active pulmonary disease.    MEME PFEIFFER MD   CT Abdomen Pelvis w/o Contrast    Narrative    CT ABDOMEN AND PELVIS WITHOUT CONTRAST   5/27/2018 2:14 AM     HISTORY: Nausea and vomiting.    COMPARISON: 2/8/2018.    TECHNIQUE: Without intravenous contrast, helical sections were  acquired from the top of the diaphragm through the pubic symphysis.  Coronal reconstructions were generated. Radiation dose for this scan  was reduced using automated exposure control, adjustment of the mA  and/or kV according to the patient's size, or iterative reconstruction  technique.    FINDINGS:     Abdomen: The liver, spleen, pancreas and adrenal glands are  unremarkable. 4.4  cm cyst containing a thin internal septation in the  upper pole of the left kidney. Excreted contrast material from a  recent CT scan present within the renal collecting systems and  ureters. The gallbladder is not visualized. Small hiatal hernia. No  enlarged lymph nodes or free fluid in the upper abdomen.  Atherosclerotic calcification in the abdominal aorta.    Scan through the lower chest is unremarkable.    Pelvis: The small and large bowel are normal in caliber. Several  colonic diverticula are present without evidence of diverticulitis.  The appendix is unremarkable. No bowel wall thickening, pneumatosis or  free intraperitoneal gas. 1 cm diverticulum from the posterior right  aspect of the urinary bladder. No enlarged lymph nodes or free fluid  in the pelvis. Bilateral L5 pars interarticularis defects.      Impression    IMPRESSION:   1. No cause of acute pain identified in the abdomen or pelvis.  2. Colonic diverticulosis without evidence of diverticulitis.    MEME PFEIFFER MD

## 2018-05-31 NOTE — CONSULTS
"NUTRITION EDUCATION    REASON FOR ASSESSMENT:  Nutrition education on American Heart Association (AHA) Heart Healthy Diet.    NUTRITION HISTORY:  Information obtained from patient and wife:   - Pt consumes a regular diet at home, consuming meals TID. Pt provides meals for himself during breakfast and lunch, and wife provides dinner. The couple goes out to dinner everyday Saturday after Mass.   - Typical intake includes: Breakfast - oatmeal or cold cereal with fruit and 1% milk. Lunch - fruit and or cottage cheese (light meal). Dinner- at home: chicken or turkey dish, out to eat: steak, shrimp, stir schulte  - Wife states that \"we do not eat a lot of fatty foods.\" She uses margarine instead of butter for cooking.    - Pt has h/o Type 2 DM and requests handouts and education on carbohydrates    CURRENT DIET ORDER:  Mod-CHO    NUTRITION DIAGNOSIS:  Food- and nutrition-related knowledge deficit R/t no previous formal nutrition education on a heart-healthy diet    INTERVENTIONS:  Nutrition Prescription:    Recommended AHA Heart Healthy Diet and Mod-CHO Diet     Implementation:     Nutrition Education (Content):  a) reviewed AHA Heart Healthy Diet guidelines  b) provided additional Healthy Heart diet handouts: The Mediterranean Diet, Understanding Diabetes Booklet.    Nutrition Education (Application):  a) Discussed current eating habits and recommended alternative food choices. Discussed CHO content in food and consuming consistent amounts throughout the day.     Anticipate good compliance    Diet Education - refer to Education flowsheet    Goals:    Patient verbalizes understanding of diet by asking appropriate questions and providing examples of how he already practices some of these guidelines    All of the above goals met during education session    Follow Up/Monitoring:    RD will be available for future questions          Shanell Conway Dietitian     "

## 2018-05-31 NOTE — PLAN OF CARE
Problem: Cardiac: ACS (Acute Coronary Syndrome) (Adult)  Goal: Signs and Symptoms of Listed Potential Problems Will be Absent, Minimized or Managed (Cardiac: ACS)  Signs and symptoms of listed potential problems will be absent, minimized or managed by discharge/transition of care (reference Cardiac: ACS (Acute Coronary Syndrome) (Adult) CPG).   Outcome: No Change  Pt alert, oriented and able to initiate needs appropriately.  Angio procedure sites WDL and pt denies any discomfort.  Pt remains sinus hayde/sinus rhythm is occasional PAC.  Pt off bedrest and tolerating activity well.  No further issues noted.

## 2018-05-31 NOTE — PROGRESS NOTES
[facility]  Cardiology Progress Note    Outpatient cardiologist: Dr. Samuel.    Date of Service (when I saw the patient): 05/31/2018    Summary: Dustin Pearce is a 89 year old male with history of chronic systolic CHF, moderate AS, DM, PA s/p LE angioplasty/stenting, paroxysmal atrial fibrillation, HTN, dyslipidemia who was admitted on 5/26/2018 with dizziness, syncope, n/v, and chest discomfort after vomiting. CT was negative for PE. Cardiology was consulted due to troponin elevated. Initially he was managed conservatively due to lack of recurrent symptoms and the presumption that his underlying illness caused a small, demand infarct. He had recurrent chest discomfort on 5/28 associated with recurrent anterolateral ST depression and dynamic inferior T wave changes. Troponin increased from 0.5 to 0.7. Cardiology was therefore asked to reevaluation.  Interval History   Sleeping this morning. Did not awaken. Discussed with his wife. He has been doing well without complaints. Cardiac rehab is planned for later this afternoon.   Assessment & Plan   1.  CAD. Coronary angiogram done due to recurrent chest discomfort associated with EKG changes and troponin elevation.   -  Angiogram showed severe disease in the proximal LAD and LCx. The RCA is occluded with large left to right collaterals.   -  S/p stenting of the LAD and Lcx with good results. He has mild to moderate diffuse disease which remains.   -  Continue aspirin and plavix, statin, and Toprolol. Imdur started 5/29 and Toprolol dose increased.  Lisinopril resumed 5/30.  2.  Ischemic cardiomyopathy. LVEF has historically been around 35 - 40%. Repeat echocardiogram shows stable LVEF with moderate to severe inferolateral wall hypokinesis and moderate inferior wall hypokinesis.   -  Continue beta blocker, lisinopril. Has not require diuretic therapy.   2.  SIRS syndrome. No clear source of infection identified.   3.  Anemia. Continue to monitor.   4.   PAD.  5.  Chronic systolic congestive heart failure.   6.  Hypertension.   7.  History of CVA.  8.  Mild to moderate aortic stenosis.     Plan:  1.  Continue aspirin and plavix for dual antiplatelet therapy.    2.  On appropriate medications including metoprolol and lisinopril. Can titrate these up as needed for BP control.   3.  He has a podiatry appointment next Friday June 8th and wife would like to follow up that day to make it easier on their schedule - scheduled appointment with ASPEN Whitten on 6/8/18 at 12:30 pm. Also scheduled an appointment with Dr. Samuel on 8/31/18 at 10:45 pm.   4.  Plan for hopefully home later today     Janis Greene PA-C    Physical Exam   Temp: 98.6  F (37  C) Temp src: Oral BP: 140/65   Heart Rate: 55 Resp: 18 SpO2: 96 % O2 Device: Nasal cannula Oxygen Delivery: 2 LPM  Vitals:    05/27/18 0319 05/28/18 0100 05/29/18 0556 05/30/18 0316   Weight: 75.3 kg (166 lb) 76 kg (167 lb 8 oz) 74.9 kg (165 lb 1.6 oz) 73.4 kg (161 lb 12.8 oz)    05/31/18 0400   Weight: 73.1 kg (161 lb 3.2 oz)     Vital Signs with Ranges  Temp:  [97.3  F (36.3  C)-99.2  F (37.3  C)] 98.6  F (37  C)  Heart Rate:  [46-64] 55  Resp:  [12-20] 18  BP: (122-169)/(53-77) 140/65  SpO2:  [94 %-96 %] 96 %  05/26 0700 - 05/31 0659  In: 2552 [P.O.:1780; I.V.:772]  Out: 2520 [Urine:2520]  Net: 32    Constitutional: Sleeping.    Data   Results for orders placed or performed during the hospital encounter of 05/26/18 (from the past 24 hour(s))   Activated clotting time POCT   Result Value Ref Range    Activated Clot Time 266 (H) 75 - 150 sec   Inpatient Coronary Angiography Adult Order    Narrative    CARDIAC CATH REPORT:     PROCEDURES PERFORMED:   1. Selective left and right coronary angiography.  2. Percutaneous coronary intervention of the proximal LAD and proximal  LCx.  3. Femoral angiography.  4. Arteriotomy closed with a closure device.  5. Sedation directed by the interventional cardiologist for total time  of 70  minutes.    PHYSICIANS:  1. Attending Interventional Cardiology Staff: Nora Santiago MD  2. Interventional Cardiology Fellow: Khanh Mukherjee MD     INDICATION:  Dustin Pearce is a 89 year old male with hx PAD with prior iliac  stents, cardiomyopathy with EF 35%, HTN, HLD, afib, DM II who  presented to hospital with sepsis and elevated lactate.  He ruled in  for NSTEMI with troponin I of 2.3 and was being medically managed but  had recurrent chest pain again.  He is now referred for coronary  angiogram for resting chest pain and NSTEMI.        DESCRIPTION:  1. Consent obtained with discussion of risks.  All questions were  answered.  2. Sterile prep and procedure.  3. Location: left  radial artery and right left common femoral artery  (after unable to get into aorta from L radial artery).  4. Access: Local anesthetic with lidocaine.  A micropuncture (21 g)  needle with ultrasound guidance was used to establish vascular access  using a modified Seldinger technique.  5. Sheath: 6Fr standard sheath in RCFA and  6Fr Slender sheath in L  radial artery.  6. Catheters: 6Fr JR-4, 6Fr JL-4, 6Fr EBU 3.75 guide.  7. Fluoroscopy time of 18,4 min.  8. Estimated blood loss of <5 mL.  9. See below for procedure details.    MEDICATIONS:  1. Contrast 250 mL IV.  2. Conscious sedation with fentanyl 100 mcg and midazolam 2mg from  1135 to 1245 for total of 70 minutes as directed by the attending  cardiologist.  3. Heparin, verapamil and nitroglycerin intra-arterial for radial  artery spasm prophylaxis.  4. Heparin administered to achieve a goal ACT > 250 sec.   5. Plavix 300 mg po.    HEMODYNAMICS:  1. HR 43 bpm  2. Ao /43 mmHg      CORONARY ANGIOGRAM:   1. Both coronary arteries arise from their respective cusps.  2. Right dominant.  3. LM has mild luminal irregularities.   4. LAD supplies the entire apex (type 3) and gives rise to septal  perforators, D1 and D2.  The pLAD has a 80-90% calcified tubular  stenosis, mLAD has a  20% stenosis, dLAD has a 30% stenosis, Diagonal  branches have minimal disease.     5. LCX gives rise to OM1 and OM2 vessels.  The pLCx has a 95% stenosis  just distal to high OM1 takeoff, mLCx has a 40% stenosis, dLCx has a  50% stenosis, OM1 has a 50% ostial stenosis and OM2 has a 20%  stenosis. LCx provides collaterals to RCA.      6. RCA is 100% occluded in the proximal portion and receives L-->R  collaterals.       PERCUTANEOUS CORONARY INTERVENTION:  1. LCx lesion:  A 6Fr EBU3.75 guide catheter was positioned at the ostium of the LM/   Heparin was administered to achieve a goal ACT > 250 sec.    A  Runthrough wire was advanced across the prox LCx lesion and positioned  in the distal LCx.  A 3.0x12mm balloon was used to pre-dilate the  lesion.  A 4.0x12mm Synergy drug eluting stent was successfully  deployed across the proxLCx lesion with inflation to 18 berry.   Post-dilation was not performed due to concern of jailing OM branch.    Final angiography showed no evidence of perforation or dissection with  residual stenosis of 10% and MICHELLE 3 flow.  No complications.    2. LAD Lesion:  The Runthrough wire was then used to wire the LAD.  The 4.0x12mm  Emerge balloon was used to dilate pLAD.  A 4.0x15mm Synergy MODESTA was  placed in proxLAD and was post-dilated with 4.5x8mm NC balloon to 24  berry.  In the proximal part of the stent, there still appeared to be  underexpansion of the stent due to piece of calcium despite very high  inflation with NC balloon.  Final angiography showed no evidence of  perforation or dissection with residual stenosis of 20% and MICHELLE 3  flow.  No complications.      FEMORAL ANGIOGRAPHY  1. The right external iliac and common femoral arteries showed mild  peripheral artery disease.  Access site was confirmed in the common  femoral artery.    ARTERIOTOMY CLOSURE:  1. A 6Fr Angioseal device was used for arteriotomy closure.    COMPLICATIONS:  1. None    SUMMARY:   1. NSTEMI secondary to severe  "three vessel coronary artery disease  with LCx likely being culprit.  2. Severe three vessel coronary artery disease including  of RCA,  severe proximal LAD and severe proximal LCx disease.  3. 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   4. Successful placement of 4.0x12mm Synergy MODESTA to pLCx and 4.0x16mm  Synergy MODESTA to pLAD.  5. Angioseal closure performed in R CFA.       PLAN:   1. Aspirin 81 mg po daily lifelong.  2. Plavix 75 mg po daily for at least 1 year uninterrupted.  3. Bedrest per protocol (2 hours).  4. Return to the primary inpatient team for further evaluation and  management.  5. Continued medical management and lifestyle modification for  cardiovascular risk factor optimization.     The attending interventional cardiologist, Dr Santiago, was present for  the entire procedure.      Khanh Mukherjee  Cardiology fellow  Pager [phone]   Nutrition Services Adult IP Consult    Narrative    Shanell Conway     5/31/2018 10:16 AM  NUTRITION EDUCATION    REASON FOR ASSESSMENT:  Nutrition education on American Heart Association (AHA) Heart   Healthy Diet.    NUTRITION HISTORY:  Information obtained from patient and wife:   - Pt consumes a regular diet at home, consuming meals TID. Pt   provides meals for himself during breakfast and lunch, and wife   provides dinner. The couple goes out to dinner everyday Saturday   after Mass.   - Typical intake includes: Breakfast - oatmeal or cold cereal   with fruit and 1% milk. Lunch - fruit and or cottage cheese   (light meal). Dinner- at home: chicken or turkey dish, out to   eat: steak, shrimp, stir schulte  - Wife states that \"we do not eat a lot of fatty foods.\" She uses   margarine instead of butter for cooking.    - Pt has h/o Type 2 DM and requests handouts and education on   carbohydrates    CURRENT DIET ORDER:  Mod-CHO    NUTRITION DIAGNOSIS:  Food- and nutrition-related knowledge deficit R/t no previous   formal nutrition education on a " heart-healthy diet    INTERVENTIONS:  Nutrition Prescription:  ? Recommended AHA Heart Healthy Diet and Mod-CHO Diet     Implementation:  ?  Nutrition Education (Content):  a) reviewed AHA Heart Healthy Diet guidelines  b) provided additional Healthy Heart diet handouts: The   Mediterranean Diet, Understanding Diabetes Booklet.  ? Nutrition Education (Application):  a) Discussed current eating habits and recommended alternative   food choices. Discussed CHO content in food and consuming   consistent amounts throughout the day.   ? Anticipate good compliance  ? Diet Education - refer to Education flowsheet    Goals:  ? Patient verbalizes understanding of diet by asking appropriate   questions and providing examples of how he already practices some   of these guidelines  ? All of the above goals met during education session    Follow Up/Monitoring:  ? RD will be available for future questions          Shanell Conway Dietitian      EKG 12-lead, tracing only    Result Value Ref Range    Interpretation ECG Click View Image link to view waveform and result    Glucose by meter   Result Value Ref Range    Glucose 145 (H) 70 - 99 mg/dL   Glucose by meter   Result Value Ref Range    Glucose 177 (H) 70 - 99 mg/dL   Glucose by meter   Result Value Ref Range    Glucose 160 (H) 70 - 99 mg/dL   Basic metabolic panel   Result Value Ref Range    Sodium 139 133 - 144 mmol/L    Potassium 4.3 3.4 - 5.3 mmol/L    Chloride 105 94 - 109 mmol/L    Carbon Dioxide 30 20 - 32 mmol/L    Anion Gap 4 3 - 14 mmol/L    Glucose 200 (H) 70 - 99 mg/dL    Urea Nitrogen 10 7 - 30 mg/dL    Creatinine 0.81 0.66 - 1.25 mg/dL    GFR Estimate 89 >60 mL/min/1.7m2    GFR Estimate If Black >90 >60 mL/min/1.7m2    Calcium 8.6 8.5 - 10.1 mg/dL   CBC with platelets   Result Value Ref Range    WBC 8.5 4.0 - 11.0 10e9/L    RBC Count 3.63 (L) 4.4 - 5.9 10e12/L    Hemoglobin 10.5 (L) 13.3 - 17.7 g/dL    Hematocrit 33.1 (L) 40.0 - 53.0 %    MCV 91 78 - 100 fl    MCH  28.9 26.5 - 33.0 pg    MCHC 31.7 31.5 - 36.5 g/dL    RDW 14.5 10.0 - 15.0 %    Platelet Count 148 (L) 150 - 450 10e9/L     Echocardiogram 5/27/18:  Interpretation Summary  The visual ejection fraction is estimated at 35-40%.  There is moderate to severe inferolateral wall hypokinesis.  There is moderate inferior wall hypokinesis.  The right ventricle is normal in size and function.  The left atrium is mildly dilated.  There is mild mitral stenosis.  Mild to moderate valvular aortic stenosis.  Mild aortic root dilatation.     Tele NSR with BBB    Medications     Continuing ACE inhibitor/ARB/ARNI from home medication list OR ACE inhibitor/ARB order already placed during this visit       - MEDICATION INSTRUCTIONS -       - MEDICATION INSTRUCTIONS -       - MEDICATION INSTRUCTIONS -       Percutaneous Coronary Intervention orders placed (this is information for BPA alerting)         aspirin  81 mg Oral Daily     atorvastatin  40 mg Oral Daily     clopidogrel  75 mg Oral Daily     insulin aspart  1-7 Units Subcutaneous TID AC     insulin aspart  1-5 Units Subcutaneous At Bedtime     isosorbide mononitrate  30 mg Oral Daily     lisinopril  5 mg Oral Daily     magnesium oxide  400 mg Oral Daily     metoprolol tartrate  50 mg Oral BID     sodium chloride (PF)  3 mL Intracatheter Q8H

## 2018-06-01 ENCOUNTER — APPOINTMENT (OUTPATIENT)
Dept: GENERAL RADIOLOGY | Facility: CLINIC | Age: 83
End: 2018-06-01
Attending: EMERGENCY MEDICINE
Payer: MEDICARE

## 2018-06-01 ENCOUNTER — HOSPITAL ENCOUNTER (OUTPATIENT)
Facility: CLINIC | Age: 83
Setting detail: OBSERVATION
Discharge: NURSING, REHAB OR LONG-TERM CARE | End: 2018-06-03
Attending: EMERGENCY MEDICINE | Admitting: HOSPITALIST
Payer: MEDICARE

## 2018-06-01 DIAGNOSIS — R07.9 ACUTE CHEST PAIN: ICD-10-CM

## 2018-06-01 DIAGNOSIS — E11.42 DM TYPE 2 WITH DIABETIC PERIPHERAL NEUROPATHY (H): ICD-10-CM

## 2018-06-01 DIAGNOSIS — I25.10 CORONARY ARTERY DISEASE INVOLVING NATIVE HEART, ANGINA PRESENCE UNSPECIFIED, UNSPECIFIED VESSEL OR LESION TYPE: ICD-10-CM

## 2018-06-01 DIAGNOSIS — D50.9 IRON DEFICIENCY ANEMIA, UNSPECIFIED IRON DEFICIENCY ANEMIA TYPE: Primary | ICD-10-CM

## 2018-06-01 PROBLEM — R07.89 CHEST DISCOMFORT: Status: ACTIVE | Noted: 2018-06-01

## 2018-06-01 LAB
ALBUMIN SERPL-MCNC: 2.9 G/DL (ref 3.4–5)
ALP SERPL-CCNC: 67 U/L (ref 40–150)
ALT SERPL W P-5'-P-CCNC: 14 U/L (ref 0–70)
ANION GAP SERPL CALCULATED.3IONS-SCNC: 7 MMOL/L (ref 3–14)
AST SERPL W P-5'-P-CCNC: 15 U/L (ref 0–45)
BACTERIA SPEC CULT: NO GROWTH
BACTERIA SPEC CULT: NO GROWTH
BASOPHILS # BLD AUTO: 0 10E9/L (ref 0–0.2)
BASOPHILS NFR BLD AUTO: 0.1 %
BILIRUB DIRECT SERPL-MCNC: 0.1 MG/DL (ref 0–0.2)
BILIRUB SERPL-MCNC: 0.6 MG/DL (ref 0.2–1.3)
BUN SERPL-MCNC: 15 MG/DL (ref 7–30)
CALCIUM SERPL-MCNC: 9.1 MG/DL (ref 8.5–10.1)
CHLORIDE SERPL-SCNC: 106 MMOL/L (ref 94–109)
CO2 SERPL-SCNC: 29 MMOL/L (ref 20–32)
CREAT SERPL-MCNC: 0.7 MG/DL (ref 0.66–1.25)
DIFFERENTIAL METHOD BLD: ABNORMAL
EOSINOPHIL # BLD AUTO: 0.1 10E9/L (ref 0–0.7)
EOSINOPHIL NFR BLD AUTO: 1.9 %
ERYTHROCYTE [DISTWIDTH] IN BLOOD BY AUTOMATED COUNT: 14.6 % (ref 10–15)
GFR SERPL CREATININE-BSD FRML MDRD: >90 ML/MIN/1.7M2
GLUCOSE BLDC GLUCOMTR-MCNC: 201 MG/DL (ref 70–99)
GLUCOSE BLDC GLUCOMTR-MCNC: 209 MG/DL (ref 70–99)
GLUCOSE BLDC GLUCOMTR-MCNC: 236 MG/DL (ref 70–99)
GLUCOSE SERPL-MCNC: 224 MG/DL (ref 70–99)
HCT VFR BLD AUTO: 32.7 % (ref 40–53)
HGB BLD-MCNC: 10.5 G/DL (ref 13.3–17.7)
IMM GRANULOCYTES # BLD: 0 10E9/L (ref 0–0.4)
IMM GRANULOCYTES NFR BLD: 0.3 %
INTERPRETATION ECG - MUSE: NORMAL
LIPASE SERPL-CCNC: 104 U/L (ref 73–393)
LYMPHOCYTES # BLD AUTO: 2 10E9/L (ref 0.8–5.3)
LYMPHOCYTES NFR BLD AUTO: 26.4 %
Lab: NORMAL
Lab: NORMAL
MCH RBC QN AUTO: 29.3 PG (ref 26.5–33)
MCHC RBC AUTO-ENTMCNC: 32.1 G/DL (ref 31.5–36.5)
MCV RBC AUTO: 91 FL (ref 78–100)
MONOCYTES # BLD AUTO: 0.9 10E9/L (ref 0–1.3)
MONOCYTES NFR BLD AUTO: 12.1 %
NEUTROPHILS # BLD AUTO: 4.5 10E9/L (ref 1.6–8.3)
NEUTROPHILS NFR BLD AUTO: 59.2 %
NRBC # BLD AUTO: 0 10*3/UL
NRBC BLD AUTO-RTO: 0 /100
NT-PROBNP SERPL-MCNC: ABNORMAL PG/ML (ref 0–1800)
PLATELET # BLD AUTO: 155 10E9/L (ref 150–450)
POTASSIUM SERPL-SCNC: 4.1 MMOL/L (ref 3.4–5.3)
PROT SERPL-MCNC: 6.6 G/DL (ref 6.8–8.8)
RBC # BLD AUTO: 3.58 10E12/L (ref 4.4–5.9)
SODIUM SERPL-SCNC: 142 MMOL/L (ref 133–144)
SPECIMEN SOURCE: NORMAL
SPECIMEN SOURCE: NORMAL
TROPONIN I SERPL-MCNC: 0.09 UG/L (ref 0–0.04)
TROPONIN I SERPL-MCNC: 0.09 UG/L (ref 0–0.04)
TROPONIN I SERPL-MCNC: 0.13 UG/L (ref 0–0.04)
TROPONIN I SERPL-MCNC: 0.13 UG/L (ref 0–0.04)
WBC # BLD AUTO: 7.5 10E9/L (ref 4–11)

## 2018-06-01 PROCEDURE — 93005 ELECTROCARDIOGRAM TRACING: CPT

## 2018-06-01 PROCEDURE — A9270 NON-COVERED ITEM OR SERVICE: HCPCS | Mod: GY | Performed by: HOSPITALIST

## 2018-06-01 PROCEDURE — 00000146 ZZHCL STATISTIC GLUCOSE BY METER IP

## 2018-06-01 PROCEDURE — 71046 X-RAY EXAM CHEST 2 VIEWS: CPT

## 2018-06-01 PROCEDURE — 36415 COLL VENOUS BLD VENIPUNCTURE: CPT | Performed by: HOSPITALIST

## 2018-06-01 PROCEDURE — 99213 OFFICE O/P EST LOW 20 MIN: CPT | Performed by: INTERNAL MEDICINE

## 2018-06-01 PROCEDURE — 25000132 ZZH RX MED GY IP 250 OP 250 PS 637: Mod: GY | Performed by: HOSPITALIST

## 2018-06-01 PROCEDURE — 80076 HEPATIC FUNCTION PANEL: CPT | Performed by: EMERGENCY MEDICINE

## 2018-06-01 PROCEDURE — 99220 ZZC INITIAL OBSERVATION CARE,LEVL III: CPT | Performed by: HOSPITALIST

## 2018-06-01 PROCEDURE — A9270 NON-COVERED ITEM OR SERVICE: HCPCS | Mod: GY | Performed by: EMERGENCY MEDICINE

## 2018-06-01 PROCEDURE — 84484 ASSAY OF TROPONIN QUANT: CPT | Performed by: HOSPITALIST

## 2018-06-01 PROCEDURE — G0378 HOSPITAL OBSERVATION PER HR: HCPCS

## 2018-06-01 PROCEDURE — 25000131 ZZH RX MED GY IP 250 OP 636 PS 637: Mod: GY | Performed by: HOSPITALIST

## 2018-06-01 PROCEDURE — 96372 THER/PROPH/DIAG INJ SC/IM: CPT

## 2018-06-01 PROCEDURE — 84484 ASSAY OF TROPONIN QUANT: CPT | Mod: 91 | Performed by: EMERGENCY MEDICINE

## 2018-06-01 PROCEDURE — 25000132 ZZH RX MED GY IP 250 OP 250 PS 637: Mod: GY | Performed by: EMERGENCY MEDICINE

## 2018-06-01 PROCEDURE — 80048 BASIC METABOLIC PNL TOTAL CA: CPT | Performed by: EMERGENCY MEDICINE

## 2018-06-01 PROCEDURE — 83880 ASSAY OF NATRIURETIC PEPTIDE: CPT | Performed by: EMERGENCY MEDICINE

## 2018-06-01 PROCEDURE — 83690 ASSAY OF LIPASE: CPT | Performed by: EMERGENCY MEDICINE

## 2018-06-01 PROCEDURE — 85025 COMPLETE CBC W/AUTO DIFF WBC: CPT | Performed by: EMERGENCY MEDICINE

## 2018-06-01 PROCEDURE — 99285 EMERGENCY DEPT VISIT HI MDM: CPT | Mod: 25

## 2018-06-01 RX ORDER — HYDRALAZINE HYDROCHLORIDE 20 MG/ML
10 INJECTION INTRAMUSCULAR; INTRAVENOUS EVERY 4 HOURS PRN
Status: DISCONTINUED | OUTPATIENT
Start: 2018-06-01 | End: 2018-06-03 | Stop reason: HOSPADM

## 2018-06-01 RX ORDER — DULOXETIN HYDROCHLORIDE 30 MG/1
30 CAPSULE, DELAYED RELEASE ORAL DAILY
Status: DISCONTINUED | OUTPATIENT
Start: 2018-06-02 | End: 2018-06-03 | Stop reason: HOSPADM

## 2018-06-01 RX ORDER — NALOXONE HYDROCHLORIDE 0.4 MG/ML
.1-.4 INJECTION, SOLUTION INTRAMUSCULAR; INTRAVENOUS; SUBCUTANEOUS
Status: DISCONTINUED | OUTPATIENT
Start: 2018-06-01 | End: 2018-06-03 | Stop reason: HOSPADM

## 2018-06-01 RX ORDER — DEXTROSE MONOHYDRATE 25 G/50ML
25-50 INJECTION, SOLUTION INTRAVENOUS
Status: DISCONTINUED | OUTPATIENT
Start: 2018-06-01 | End: 2018-06-03 | Stop reason: HOSPADM

## 2018-06-01 RX ORDER — ALUMINA, MAGNESIA, AND SIMETHICONE 2400; 2400; 240 MG/30ML; MG/30ML; MG/30ML
30 SUSPENSION ORAL EVERY 4 HOURS PRN
Status: DISCONTINUED | OUTPATIENT
Start: 2018-06-01 | End: 2018-06-03 | Stop reason: HOSPADM

## 2018-06-01 RX ORDER — ASPIRIN 81 MG/1
324 TABLET, CHEWABLE ORAL ONCE
Status: COMPLETED | OUTPATIENT
Start: 2018-06-01 | End: 2018-06-01

## 2018-06-01 RX ORDER — NITROGLYCERIN 0.4 MG/1
0.4 TABLET SUBLINGUAL EVERY 5 MIN PRN
Status: DISCONTINUED | OUTPATIENT
Start: 2018-06-01 | End: 2018-06-03 | Stop reason: HOSPADM

## 2018-06-01 RX ORDER — LIDOCAINE 40 MG/G
CREAM TOPICAL
Status: DISCONTINUED | OUTPATIENT
Start: 2018-06-01 | End: 2018-06-03 | Stop reason: HOSPADM

## 2018-06-01 RX ORDER — LISINOPRIL 5 MG/1
5 TABLET ORAL DAILY
Status: DISCONTINUED | OUTPATIENT
Start: 2018-06-02 | End: 2018-06-03 | Stop reason: HOSPADM

## 2018-06-01 RX ORDER — GLIPIZIDE 5 MG/1
10 TABLET ORAL
Status: DISCONTINUED | OUTPATIENT
Start: 2018-06-01 | End: 2018-06-03 | Stop reason: HOSPADM

## 2018-06-01 RX ORDER — NITROGLYCERIN 0.4 MG/1
0.4 TABLET SUBLINGUAL EVERY 5 MIN PRN
Status: DISCONTINUED | OUTPATIENT
Start: 2018-06-01 | End: 2018-06-01

## 2018-06-01 RX ORDER — TAMSULOSIN HYDROCHLORIDE 0.4 MG/1
0.4 CAPSULE ORAL DAILY
Status: DISCONTINUED | OUTPATIENT
Start: 2018-06-02 | End: 2018-06-03 | Stop reason: HOSPADM

## 2018-06-01 RX ORDER — ACETAMINOPHEN 650 MG/1
650 SUPPOSITORY RECTAL EVERY 4 HOURS PRN
Status: DISCONTINUED | OUTPATIENT
Start: 2018-06-01 | End: 2018-06-03 | Stop reason: HOSPADM

## 2018-06-01 RX ORDER — ATORVASTATIN CALCIUM 40 MG/1
40 TABLET, FILM COATED ORAL AT BEDTIME
Status: DISCONTINUED | OUTPATIENT
Start: 2018-06-01 | End: 2018-06-03 | Stop reason: HOSPADM

## 2018-06-01 RX ORDER — ACETAMINOPHEN 325 MG/1
650 TABLET ORAL EVERY 4 HOURS PRN
Status: DISCONTINUED | OUTPATIENT
Start: 2018-06-01 | End: 2018-06-03 | Stop reason: HOSPADM

## 2018-06-01 RX ORDER — ISOSORBIDE MONONITRATE 30 MG/1
30 TABLET, EXTENDED RELEASE ORAL DAILY
Status: DISCONTINUED | OUTPATIENT
Start: 2018-06-02 | End: 2018-06-02

## 2018-06-01 RX ORDER — CLOPIDOGREL BISULFATE 75 MG/1
75 TABLET ORAL DAILY
Status: DISCONTINUED | OUTPATIENT
Start: 2018-06-01 | End: 2018-06-03 | Stop reason: HOSPADM

## 2018-06-01 RX ORDER — NICOTINE POLACRILEX 4 MG
15-30 LOZENGE BUCCAL
Status: DISCONTINUED | OUTPATIENT
Start: 2018-06-01 | End: 2018-06-03 | Stop reason: HOSPADM

## 2018-06-01 RX ORDER — ASPIRIN 81 MG/1
81 TABLET ORAL DAILY
Status: DISCONTINUED | OUTPATIENT
Start: 2018-06-02 | End: 2018-06-03 | Stop reason: HOSPADM

## 2018-06-01 RX ORDER — METOPROLOL TARTRATE 50 MG
50 TABLET ORAL 2 TIMES DAILY
Status: DISCONTINUED | OUTPATIENT
Start: 2018-06-01 | End: 2018-06-03 | Stop reason: HOSPADM

## 2018-06-01 RX ADMIN — NITROGLYCERIN 0.4 MG: 0.4 TABLET SUBLINGUAL at 08:29

## 2018-06-01 RX ADMIN — INSULIN ASPART 2 UNITS: 100 INJECTION, SOLUTION INTRAVENOUS; SUBCUTANEOUS at 19:05

## 2018-06-01 RX ADMIN — GLIPIZIDE 10 MG: 5 TABLET ORAL at 19:06

## 2018-06-01 RX ADMIN — OMEPRAZOLE 40 MG: 20 CAPSULE, DELAYED RELEASE ORAL at 20:42

## 2018-06-01 RX ADMIN — CLOPIDOGREL 75 MG: 75 TABLET, FILM COATED ORAL at 17:59

## 2018-06-01 RX ADMIN — ATORVASTATIN CALCIUM 40 MG: 40 TABLET, FILM COATED ORAL at 20:41

## 2018-06-01 RX ADMIN — ASPIRIN 81 MG 324 MG: 81 TABLET ORAL at 08:28

## 2018-06-01 RX ADMIN — METOPROLOL TARTRATE 50 MG: 50 TABLET ORAL at 20:42

## 2018-06-01 ASSESSMENT — ENCOUNTER SYMPTOMS
NAUSEA: 0
VOMITING: 0
LIGHT-HEADEDNESS: 0
SHORTNESS OF BREATH: 1
DIAPHORESIS: 0

## 2018-06-01 ASSESSMENT — PAIN DESCRIPTION - DESCRIPTORS: DESCRIPTORS: PRESSURE

## 2018-06-01 NOTE — IP AVS SNAPSHOT
"          [facility] CARDIAC SPECIALTY CARE: [phone]                                              INTERAGENCY TRANSFER FORM - LAB / IMAGING / EKG / EMG RESULTS   2018                    Hospital Admission Date: 2018  SID AGUIAR   : 1928  Sex: Male        Attending Provider: Bronson Whitaker MD     Allergies:  Oxycodone, Sulfa Drugs, Tetracycline    Infection:  None   Service:  HOSPITALIST    Ht:  1.651 m (5' 5\")   Wt:  71.8 kg (158 lb 6.4 oz)   Admission Wt:  72.6 kg (160 lb)    BMI:  26.36 kg/m 2   BSA:  1.82 m 2            Patient PCP Information     Provider PCP Type    Matt Morrissey MD General    Matt Morrissey MD Internal Medicine         Lab Results - 3 Days      Vitamin B12 [[id number]]  Resulted: 18 1138, Result status: Final result    Ordering provider: Pat Garcia MD  18 1532 Resulting lab: [facility]    Specimen Information    Type Source Collected On   Blood  18 05          Components       Value Reference Range Flag Lab   Vitamin B12 230 193 - 986 pg/mL  51            Glucose by meter [[id number]] (Abnormal)  Resulted: 18 0746, Result status: Final result    Ordering provider: Bronson Whitaker MD  18 07 Resulting lab: POINT OF CARE TEST, GLUCOSE    Specimen Information    Type Source Collected On     18 0733          Components       Value Reference Range Flag Lab   Glucose 156 70 - 99 mg/dL H 170            T4 free [[id number]]  Resulted: 18 0623, Result status: Final result    Ordering provider: Pat Garcia MD  18 05 Resulting lab: [facility]    Specimen Information    Type Source Collected On     18 05          Components       Value Reference Range Flag Lab   T4 Free 0.98 0.76 - 1.46 ng/dL  FrStHsLb            TSH with free T4 reflex [[id number]] (Abnormal)  Resulted: 18 0609, Result status: Final result    Ordering provider: " Pat Garcia MD  06/03/18 0520 Resulting lab: [facility]    Specimen Information    Type Source Collected On     06/03/18 0520          Components       Value Reference Range Flag Lab   TSH 4.45 0.40 - 4.00 mU/L H FrStHsLb            Basic metabolic panel [[id number]] (Abnormal)  Resulted: 06/03/18 0603, Result status: Final result    Ordering provider: Pat Garcia MD  06/03/18 0000 Resulting lab: [facility]    Specimen Information    Type Source Collected On   Blood  06/03/18 0520          Components       Value Reference Range Flag Lab   Sodium 139 133 - 144 mmol/L  FrStHsLb   Potassium 3.8 3.4 - 5.3 mmol/L  FrStHsLb   Chloride 105 94 - 109 mmol/L  FrStHsLb   Carbon Dioxide 27 20 - 32 mmol/L  FrStHsLb   Anion Gap 7 3 - 14 mmol/L  FrStHsLb   Glucose 145 70 - 99 mg/dL H FrStHsLb   Urea Nitrogen 13 7 - 30 mg/dL  FrStHsLb   Creatinine 0.82 0.66 - 1.25 mg/dL  FrStHsLb   GFR Estimate 88 >60 mL/min/1.7m2  FrStHsLb   Comment:  Non  GFR Calc   GFR Estimate If Black >90 >60 mL/min/1.7m2  FrStHsLb   Comment:  African American GFR Calc   Calcium 8.4 8.5 - 10.1 mg/dL L FrStHsLb            CK total [[id number]]  Resulted: 06/03/18 0603, Result status: Final result    Ordering provider: Pat Garcia MD  06/03/18 0520 Resulting lab: [facility]    Specimen Information    Type Source Collected On     06/03/18 0520          Components       Value Reference Range Flag Lab   CK Total 41 30 - 300 U/L  FrStHsLb            Iron and iron binding capacity [[id number]] (Abnormal)  Resulted: 06/03/18 0603, Result status: Final result    Ordering provider: Pat Garcia MD  06/03/18 0000 Resulting lab: [facility]    Specimen Information    Type Source Collected On   Blood  06/03/18 0520          Components       Value Reference Range Flag Lab   Iron 27 35 - 180 ug/dL L FrStHsLb   Iron Binding Cap 287 240 - 430 ug/dL  FrStHsLb   Iron  Saturation Index 9 15 - 46 % L FrStHsLb            Hemoglobin [[id number]] (Abnormal)  Resulted: 06/03/18 0542, Result status: Final result    Ordering provider: Pat Garcia MD  06/03/18 0000 Resulting lab: [facility]    Specimen Information    Type Source Collected On   Blood  06/03/18 0520          Components       Value Reference Range Flag Lab   Hemoglobin 9.8 13.3 - 17.7 g/dL L FrStHsLb            Folate [[id number]]  Resulted: 06/03/18 0538, Result status: In process    Ordering provider: Pat Garcia MD  06/02/18 1532 Resulting lab: [facility]    Specimen Information    Type Source Collected On   Blood  06/03/18 0520            Glucose by meter [[id number]] (Abnormal)  Resulted: 06/03/18 0206, Result status: Final result    Ordering provider: Bronson Whitaker MD  06/03/18 0154 Resulting lab: POINT OF CARE TEST, GLUCOSE    Specimen Information    Type Source Collected On     06/03/18 0154          Components       Value Reference Range Flag Lab   Glucose 176 70 - 99 mg/dL H 170            Glucose by meter [[id number]] (Abnormal)  Resulted: 06/02/18 2122, Result status: Final result    Ordering provider: Bronson hWitaker MD  06/02/18 2110 Resulting lab: POINT OF CARE TEST, GLUCOSE    Specimen Information    Type Source Collected On     06/02/18 2110          Components       Value Reference Range Flag Lab   Glucose 332 70 - 99 mg/dL H 170            Glucose by meter [[id number]] (Abnormal)  Resulted: 06/02/18 1734, Result status: Final result    Ordering provider: Bronson Whitaker MD  06/02/18 1722 Resulting lab: POINT OF CARE TEST, GLUCOSE    Specimen Information    Type Source Collected On     06/02/18 1722          Components       Value Reference Range Flag Lab   Glucose 170 70 - 99 mg/dL H 170            Glucose by meter [[id number]] (Abnormal)  Resulted: 06/02/18 1225, Result status: Final result    Ordering provider: Bronson Whitaker MD  06/02/18 1213 Resulting lab:  POINT OF CARE TEST, GLUCOSE    Specimen Information    Type Source Collected On     06/02/18 1213          Components       Value Reference Range Flag Lab   Glucose 298 70 - 99 mg/dL H 170            Glucose by meter [[id number]] (Abnormal)  Resulted: 06/02/18 0806, Result status: Final result    Ordering provider: Bronson Whitaker MD  06/02/18 0754 Resulting lab: POINT OF CARE TEST, GLUCOSE    Specimen Information    Type Source Collected On     06/02/18 0754          Components       Value Reference Range Flag Lab   Glucose 173 70 - 99 mg/dL H 170            Glucose by meter [[id number]] (Abnormal)  Resulted: 06/01/18 2140, Result status: Final result    Ordering provider: Bronson Whitaker MD  06/01/18 2128 Resulting lab: POINT OF CARE TEST, GLUCOSE    Specimen Information    Type Source Collected On     06/01/18 2128          Components       Value Reference Range Flag Lab   Glucose 209 70 - 99 mg/dL H 170            Troponin I - Now then in 4 hours x 2  [[id number]] (Abnormal)  Resulted: 06/01/18 2018, Result status: Final result    Ordering provider: Bronson Whitaker MD  06/01/18 1420 Resulting lab: [facility]    Specimen Information    Type Source Collected On   Blood  06/01/18 1940          Components       Value Reference Range Flag Lab   Troponin I ES 0.093 0.000 - 0.045 ug/L H FrStHsLb   Comment:         The 99th percentile for upper reference range is 0.045 ug/L.  Troponin values   in the range of 0.045 - 0.120 ug/L may be associated with risks of adverse   clinical events.              Glucose by meter [[id number]] (Abnormal)  Resulted: 06/01/18 1823, Result status: Final result    Ordering provider: Bronson Whitaker MD  06/01/18 1811 Resulting lab: POINT OF CARE TEST, GLUCOSE    Specimen Information    Type Source Collected On     06/01/18 1811          Components       Value Reference Range Flag Lab   Glucose 236 70 - 99 mg/dL H 170            Troponin I - Now then in 4  hours x 2  [[id number]] (Abnormal)  Resulted: 06/01/18 1651, Result status: Final result    Ordering provider: Bronson Whitaker MD  06/01/18 1420 Resulting lab: [facility]    Specimen Information    Type Source Collected On   Blood  06/01/18 1605          Components       Value Reference Range Flag Lab   Troponin I ES 0.093 0.000 - 0.045 ug/L H FrStHsLb   Comment:         The 99th percentile for upper reference range is 0.045 ug/L.  Troponin values   in the range of 0.045 - 0.120 ug/L may be associated with risks of adverse   clinical events.              Glucose by meter [[id number]] (Abnormal)  Resulted: 06/01/18 1537, Result status: Final result    Ordering provider: Bronson Whitaker MD  06/01/18 1525 Resulting lab: POINT OF CARE TEST, GLUCOSE    Specimen Information    Type Source Collected On     06/01/18 1525          Components       Value Reference Range Flag Lab   Glucose 201 70 - 99 mg/dL H 170            Hepatic panel [[id number]] (Abnormal)  Resulted: 06/01/18 1145, Result status: Final result    Ordering provider: Chrissy Lezama MD  06/01/18 0810 Resulting lab: [facility]    Specimen Information    Type Source Collected On     06/01/18 0810          Components       Value Reference Range Flag Lab   Bilirubin Direct 0.1 0.0 - 0.2 mg/dL  FrStHsLb   Bilirubin Total 0.6 0.2 - 1.3 mg/dL  FrStHsLb   Albumin 2.9 3.4 - 5.0 g/dL L FrStHsLb   Protein Total 6.6 6.8 - 8.8 g/dL L FrStHsLb   Alkaline Phosphatase 67 40 - 150 U/L  FrStHsLb   ALT 14 0 - 70 U/L  FrStHsLb   AST 15 0 - 45 U/L  FrStHsLb            Lipase [[id number]]  Resulted: 06/01/18 1145, Result status: Final result    Ordering provider: Chrissy Lezama MD  06/01/18 0810 Resulting lab: [facility]    Specimen Information    Type Source Collected On     06/01/18 0810          Components       Value Reference Range Flag Lab   Lipase 104 73 - 393 U/L  FrStHsLb             Troponin I (now) [[id number]] (Abnormal)  Resulted: 06/01/18 1104, Result status: Final result    Ordering provider: Chrissy Lezama MD  06/01/18 1000 Resulting lab: [facility]    Specimen Information    Type Source Collected On   Blood  06/01/18 1015          Components       Value Reference Range Flag Lab   Troponin I ES 0.133 0.000 - 0.045 ug/L  FrStHsLb   Comment:         The 99th percentile for upper reference range is 0.045 ug/L.  Troponin values   in the range of 0.045 - 0.120 ug/L may be associated with risks of adverse   clinical events.  Previous critical documented              Troponin I (now) [[id number]] (Abnormal)  Resulted: 06/01/18 0847, Result status: Final result    Ordering provider: Chrissy Lezama MD  06/01/18 0804 Resulting lab: [facility]    Specimen Information    Type Source Collected On   Blood  06/01/18 0810          Components       Value Reference Range Flag Lab   Troponin I ES 0.130 0.000 - 0.045 ug/L  FrStHsLb   Comment:         The 99th percentile for upper reference range is 0.045 ug/L.  Troponin values   in the range of 0.045 - 0.120 ug/L may be associated with risks of adverse   clinical events.  Previous critical documented              BNP [[id number]] (Abnormal)  Resulted: 06/01/18 0845, Result status: Final result    Ordering provider: Chrissy Lezama MD  06/01/18 0804 Resulting lab: [facility]    Specimen Information    Type Source Collected On   Blood  06/01/18 0810          Components       Value Reference Range Flag Lab   N-Terminal Pro BNP Inpatient 98762 0 - 1800 pg/mL H FrStHsLb   Comment:            Reference range shown and results flagged as abnormal are suggested inpatient   cut points for confirming diagnosis if CHF in an acute setting. Establishing a   baseline value for each individual patient is useful for follow-up. An   inpatient or emergency department NT-proPBNP <300  pg/mL effectively rules out   acute CHF, with 99% negative predictive value.  The outpatient non-acute reference range for ruling out CHF is:   0-125 pg/mL (age 18 to less than 75)   0-450 pg/mL (age 75 yrs and older)              Basic metabolic panel [[id number]] (Abnormal)  Resulted: 06/01/18 0841, Result status: Final result    Ordering provider: Chrissy Lezama MD  06/01/18 0804 Resulting lab: [facility]    Specimen Information    Type Source Collected On   Blood  06/01/18 0810          Components       Value Reference Range Flag Lab   Sodium 142 133 - 144 mmol/L  FrStHsLb   Potassium 4.1 3.4 - 5.3 mmol/L  FrStHsLb   Chloride 106 94 - 109 mmol/L  FrStHsLb   Carbon Dioxide 29 20 - 32 mmol/L  FrStHsLb   Anion Gap 7 3 - 14 mmol/L  FrStHsLb   Glucose 224 70 - 99 mg/dL H FrStHsLb   Urea Nitrogen 15 7 - 30 mg/dL  FrStHsLb   Creatinine 0.70 0.66 - 1.25 mg/dL  FrStHsLb   GFR Estimate >90 >60 mL/min/1.7m2  FrStHsLb   Comment:  Non  GFR Calc   GFR Estimate If Black >90 >60 mL/min/1.7m2  FrStHsLb   Comment:  African American GFR Calc   Calcium 9.1 8.5 - 10.1 mg/dL  FrStHsLb            CBC with platelets differential [[id number]] (Abnormal)  Resulted: 06/01/18 0826, Result status: Final result    Ordering provider: Chrissy Lezama MD  06/01/18 0804 Resulting lab: [facility]    Specimen Information    Type Source Collected On   Blood  06/01/18 0810          Components       Value Reference Range Flag Lab   WBC 7.5 4.0 - 11.0 10e9/L  FrStHsLb   RBC Count 3.58 4.4 - 5.9 10e12/L L FrStHsLb   Hemoglobin 10.5 13.3 - 17.7 g/dL L FrStHsLb   Hematocrit 32.7 40.0 - 53.0 % L FrStHsLb   MCV 91 78 - 100 fl  FrStHsLb   MCH 29.3 26.5 - 33.0 pg  FrStHsLb   MCHC 32.1 31.5 - 36.5 g/dL  FrStHsLb   RDW 14.6 10.0 - 15.0 %  FrStHsLb   Platelet Count 155 150 - 450 10e9/L  FrStHsLb   Diff Method Automated Method   FrStHsLb   % Neutrophils 59.2 %  FrStHsLb   % Lymphocytes 26.4  %  FrStHsLb   % Monocytes 12.1 %  FrStHsLb   % Eosinophils 1.9 %  FrStHsLb   % Basophils 0.1 %  FrStHsLb   % Immature Granulocytes 0.3 %  FrStHsLb   Nucleated RBCs 0 0 /100  FrStHsLb   Absolute Neutrophil 4.5 1.6 - 8.3 10e9/L  FrStHsLb   Absolute Lymphocytes 2.0 0.8 - 5.3 10e9/L  FrStHsLb   Absolute Monocytes 0.9 0.0 - 1.3 10e9/L  FrStHsLb   Absolute Eosinophils 0.1 0.0 - 0.7 10e9/L  FrStHsLb   Absolute Basophils 0.0 0.0 - 0.2 10e9/L  FrStHsLb   Abs Immature Granulocytes 0.0 0 - 0.4 10e9/L  FrStHsLb   Absolute Nucleated RBC 0.0   FrStHsLb            Blood culture [[id number]]  Resulted: 06/01/18 0404, Result status: Final result    Ordering provider: Sybil Fontaine MD  05/26/18 2120 Resulting lab: [facility]    Specimen Information    Type Source Collected On   Blood  05/26/18 2135   Comment:  Left Arm          Components       Value Reference Range Flag Lab   Specimen Description Blood Left Arm      Special Requests Aerobic and anaerobic bottles received   FrStHsLb   Culture Micro No growth   75            Blood culture [[id number]]  Resulted: 06/01/18 0404, Result status: Final result    Ordering provider: Sybil Fontaine MD  05/26/18 2120 Resulting lab: [facility]    Specimen Information    Type Source Collected On   Blood  05/26/18 2135   Comment:  Right Arm          Components       Value Reference Range Flag Lab   Specimen Description Blood Right Arm      Special Requests Aerobic and anaerobic bottles received   FrStHsLb   Culture Micro No growth   75            Glucose by meter [[id number]] (Abnormal)  Resulted: 05/31/18 1137, Result status: Final result    Ordering provider: Amilcar Sharma MD  05/31/18 0836 Resulting lab: POINT OF CARE TEST, GLUCOSE    Specimen Information    Type Source Collected On     05/31/18 0836          Components       Value Reference Range Flag Lab   Glucose 173 70 - 99 mg/dL H 170            Glucose by  meter [[id number]] (Abnormal)  Resulted: 05/31/18 1137, Result status: Final result    Ordering provider: Amilcar Sharma MD  05/31/18 1123 Resulting lab: POINT OF CARE TEST, GLUCOSE    Specimen Information    Type Source Collected On     05/31/18 1123          Components       Value Reference Range Flag Lab   Glucose 189 70 - 99 mg/dL H 170            Basic metabolic panel [[id number]] (Abnormal)  Resulted: 05/31/18 0629, Result status: Final result    Ordering provider: Khanh Mukherjee MD  05/31/18 0001 Resulting lab: [facility]    Specimen Information    Type Source Collected On   Blood  05/31/18 0600          Components       Value Reference Range Flag Lab   Sodium 139 133 - 144 mmol/L  FrStHsLb   Potassium 4.3 3.4 - 5.3 mmol/L  FrStHsLb   Chloride 105 94 - 109 mmol/L  FrStHsLb   Carbon Dioxide 30 20 - 32 mmol/L  FrStHsLb   Anion Gap 4 3 - 14 mmol/L  FrStHsLb   Glucose 200 70 - 99 mg/dL H FrStHsLb   Urea Nitrogen 10 7 - 30 mg/dL  FrStHsLb   Creatinine 0.81 0.66 - 1.25 mg/dL  FrStHsLb   GFR Estimate 89 >60 mL/min/1.7m2  FrStHsLb   Comment:  Non  GFR Calc   GFR Estimate If Black >90 >60 mL/min/1.7m2  FrStHsLb   Comment:  African American GFR Calc   Calcium 8.6 8.5 - 10.1 mg/dL  FrStHsLb            CBC with platelets [[id number]] (Abnormal)  Resulted: 05/31/18 0618, Result status: Final result    Ordering provider: Khanh Mukherjee MD  05/31/18 0001 Resulting lab: [facility]    Specimen Information    Type Source Collected On   Blood  05/31/18 0600          Components       Value Reference Range Flag Lab   WBC 8.5 4.0 - 11.0 10e9/L  FrStHsLb   RBC Count 3.63 4.4 - 5.9 10e12/L L FrStHsLb   Hemoglobin 10.5 13.3 - 17.7 g/dL L FrStHsLb   Hematocrit 33.1 40.0 - 53.0 % L FrStHsLb   MCV 91 78 - 100 fl  FrStHsLb   MCH 28.9 26.5 - 33.0 pg  FrStHsLb   MCHC 31.7 31.5 - 36.5 g/dL  FrStHsLb   RDW 14.5 10.0 - 15.0 %  FrStHsLb   Platelet Count 148 150 - 450 10e9/L L  FrStHsLb            Testing Performed By     Lab - Abbreviation Name Director Address Valid Date Range    14 - FrStHsLb [facility] Unknown [address] [place] [address] 05/08/15 1057 - Present    45 - YQI786 [facility] Unknown Unknown 01/28/02 0000 - Present    51 - Unknown [facility] Unknown [address] [facility] [address] 12/31/14 1010 - Present    75 - Unknown [facility] Unknown [address] [facility] [address] 01/15/15 1019 - Present    170 - Unknown POINT OF CARE TEST, GLUCOSE Unknown Unknown 10/31/11 1114 - Present            Unresulted Labs     None         Imaging Results - 3 Days      XR Chest 2 Views [[id number]]  Resulted: 06/01/18 0941, Result status: Final result    Ordering provider: Chrissy Lezama MD  06/01/18 0804 Resulted by: Galdino Mills MD    Performed: 06/01/18 0815 - 06/01/18 0822 Resulting lab: RADIOLOGY RESULTS    Narrative:       XR CHEST 2 VW 6/1/2018 8:22 AM    HISTORY: Pain.    COMPARISON: May 26, 2018.      Impression:       IMPRESSION: Stable subtle opacification of the left lateral lung base,  likely atelectasis or scarring as before. No new focal pulmonary  opacities otherwise. No pleural effusions or pneumothorax. Stable  heart and mediastinum.    GALDINO MILLS MD      Testing Performed By     Lab - Abbreviation Name Director Address Valid Date Range    104 - Rad Rslts RADIOLOGY RESULTS Unknown Unknown 02/16/05 1553 - Present               ECG/EMG Results      Echocardiogram Complete [[id number]]  Resulted: 05/27/18 1234, Result status: In process    Ordering provider: Amilcar Sharma MD  05/27/18 0314 Performed: 05/27/18 1234 - 05/27/18 1234    Resulting lab: [facility]             ECHO LIMITED WITH OPTISON [[id number]]  Resulted: 05/27/18 1250, Result status: Edited Result - FINAL    Ordering provider: Amilcar Sharma MD  05/27/18 0314 Resulted by: Davis Tiwari MD     Performed: 18 1234 - 18 1318 Resulting lab: RADIOLOGY RESULTS    Narrative:       [id number]  [facility]  [id number]  [id number]^SHMUEL^CHRAISSE^LARISSA           [facility]  Echocardiography Laboratory  [address] [place] [address]        Name: SID AGUIAR  MRN: [id number]  : 1928  Study Date: 2018 12:50 PM  Age: 89 yrs  Gender: Male  Patient Location: [facility]  Reason For Study: CHF  Ordering Physician: CHARISSE MI  Referring Physician: CHARISSE MI  Performed By: [facility] Deidra Hart     BSA: 1.8 m2  Height: 66 in  Weight: 166 lb  HR: 80  BP: 124/55 mmHg  _____________________________________________________________________________  __        Procedure  Complete Portable Echo Adult. Contrast Optison.  _____________________________________________________________________________  __        Interpretation Summary     The visual ejection fraction is estimated at 35-40%.  There is moderate to severe inferolateral wall hypokinesis.  There is moderate inferior wall hypokinesis.  The right ventricle is normal in size and function.  The left atrium is mildly dilated.  There is mild mitral stenosis.  Mild to moderate valvular aortic stenosis.  Mild aortic root dilatation.  _____________________________________________________________________________  __        Left Ventricle  The left ventricle is borderline dilated. Proximal septal thickening is noted.  The visual ejection fraction is estimated at 35-40%. Diastolic function not  assessed due to significant mitral annular calcification. There is moderate to  severe inferolateral wall hypokinesis. There is moderate inferior wall  hypokinesis.     Right Ventricle  The right ventricle is normal in size and function.     Atria  The left atrium is mildly dilated. Right atrial size is normal. There is no  atrial shunt seen.     Mitral Valve  There is moderate mitral annular calcification. The mitral valve leaflets are  moderately  thickened. There is trace mitral regurgitation. The mean mitral  valve gradient is 4.4 mmHg. There is mild mitral stenosis.        Tricuspid Valve  The tricuspid valve is not well visualized, but is grossly normal.     Aortic Valve  The aortic valve is not well visualized. No aortic regurgitation is present.  The mean AoV pressure gradient is 12.8 mmHg. Mild to moderate valvular aortic  stenosis.     Pulmonic Valve  The pulmonic valve is not well seen, but is grossly normal.     Vessels  Mild aortic root dilatation. Normal size ascending aorta. The inferior vena  cava is not dilated.     Pericardium  There is no pericardial effusion.        Rhythm  The rhythm was sinus bradycardia.  _____________________________________________________________________________  __  MMode/2D Measurements & Calculations  IVSd: 1.3 cm     LVIDd: 5.7 cm  LVIDs: 4.9 cm  LVPWd: 1.1 cm  FS: 13.0 %  LV mass(C)d: 279.4 grams  LV mass(C)dI: 151.2 grams/m2  Ao root diam: 4.2 cm  LA dimension: 4.0 cm  asc Aorta Diam: 3.4 cm  LA/Ao: 0.97  LVOT diam: 2.2 cm  LVOT area: 4.0 cm2     EF(MOD-bp): 53.1 %  LA Volume (BP): 55.8 ml  LA Volume Index (BP): 30.2 ml/m2  RWT: 0.37           Doppler Measurements & Calculations  MV E max donnie: 85.7 cm/sec  MV A max donnie: 159.8 cm/sec  MV E/A: 0.54  MV max P.7 mmHg  MV mean P.4 mmHg  MV V2 VTI: 49.3 cm  MVA(VTI): 1.7 cm2  MV dec time: 0.29 sec  Ao V2 max: 259.5 cm/sec  Ao max P.9 mmHg  Ao V2 mean: 172.2 cm/sec  Ao mean P.8 mmHg  Ao V2 VTI: 55.2 cm  YULIYA(I,D): 1.6 cm2  YULIYA(V,D): 1.3 cm2  LV V1 max PG: 3.1 mmHg  LV V1 max: 87.4 cm/sec  LV V1 VTI: 21.6 cm  SV(LVOT): 85.6 ml  SI(LVOT): 46.3 ml/m2  Pulm Sys Donnie: 82.0 cm/sec  Pulm Delgado Donnie: 44.3 cm/sec  Pulm A Revs Donnie: 39.4 cm/sec  Pulm S/D: 1.9  AV Donnie Ratio (DI): 0.34  YULIYA Index (cm2/m2): 0.84  E/E' av.4  Lateral E/e': 12.4  Medial E/e': 20.4              _____________________________________________________________________________  __         Report approved by: Davis Mullen 05/27/2018 02:13 PM       1    Type Source Collected On     05/27/18 1250          View Image (below)              Encounter-Level Documents:     There are no encounter-level documents.      Order-Level Documents:     There are no order-level documents.

## 2018-06-01 NOTE — LETTER
Transition Communication Hand-off for Care Transitions to Next Level of Care Provider    Name: Dustin Pearce  : 1928  MRN #: [id number]  Primary Care Provider: Matt Morrissey     Primary Clinic: [address]  [facility] [address]     Reason for Hospitalization:  Acute chest pain [R07.9]  Admit Date/Time: 2018  7:56 AM  Discharge Date: 6/3 to [facility]  Payor Source: Payor: MEDICARE / Plan: MEDICARE / Product Type: Medicare /     Readmission Assessment Measure (LICHA) Risk Score/category: Elevated    Plan of Care Goals/Milestone Events:   Patient Concerns:    Patient Goals: return to home for return to baseline   Short-term TCU stay for rehabilitation    Long-term    Medical Goals   Short-term    Long-term          Reason for Communication Hand-off Referral: Admission diagnoses: CHF  Fragility  Difficulty understanding plan of care  Multiple providers/specialties    Discharge Plan:   Patient was previously admitted to [facility] on  for NSTEMI s/p P/S readmitted for chest pain.  Patients labs normalized and medically stable for d/c to TCU.  Patient is scheduled for follow up with [facility] and Dr. Morrissey (podiatry) next week both on .  Wife is wanting to keep these appointments as scheduled.  Patient and spouse were encouraged to discuss goals of care this admission, however restorative cares are what the patient and spouse are wanting at this time.  Plan is for the patient to further discuss goals of care with his PCP.     Concern for non-adherence with plan of care:   Y/N n  Discharge Needs Assessment:  Needs       Most Recent Value    Anticipated Changes Related to Illness none    Equipment Currently Used at Home cane, straight, grab bar    Transportation Available family or friend will provide    # of Referrals Placed by [facility]          Already enrolled in Tele-monitoring program and name of program:   Follow-up specialty is recommended: Yes    Follow-up plan:   Future Appointments  Date Time Provider Department Center   6/8/2018 12:30 PM [facility]   6/8/2018 2:00 PM [facility]   7/12/2018 6:30 PM Matt Morrissey MD [facility]    8/31/2018 10:45 AM Rafa Samuel MD [facility]       Any outstanding tests or procedures:        Referrals     Future Labs/Procedures    Occupational Therapy Adult Consult     Comments:    Evaluate and treat as clinically indicated.    Reason: physical deconditioning    Physical Therapy Adult Consult     Comments:    Evaluate and treat as clinically indicated.    Reason: physical deconditioning            Key Recommendations:      Lexis Michael    AVS/Discharge Summary is the source of truth; this is a helpful guide for improved communication of patient story

## 2018-06-01 NOTE — PHARMACY-ADMISSION MEDICATION HISTORY
Admission medication history interview status for the 6/1/2018  admission is complete. See EPIC admission navigator for prior to admission medications     Medication history source reliability:Good    Actions taken by pharmacist (provider contacted, etc):    Interviewed patient      Additional medication history information not noted on PTA med list :  Filled prescriptions for metoprolol, isosorbide, and lisinopril    Medication reconciliation/reorder completed by provider prior to medication history? No    Time spent in this activity: 20 min    Prior to Admission medications    Medication Sig Last Dose Taking? Auth Provider   AMITRIPTYLINE HCL PO Take 25 mg by mouth nightly as needed for sleep Past Month at more than 2 weeks ago Yes Unknown, Entered By History   aspirin EC 81 MG EC tablet Take 1 tablet (81 mg) by mouth daily 5/31/2018 at Unknown time Yes Tra Reynoso MD   ATORVASTATIN CALCIUM PO Take 40 mg by mouth At Bedtime  5/31/2018 at pm Yes Reported, Patient   bisacodyl (DULCOLAX) 10 MG Suppository Place 10 mg rectally daily as needed for constipation prn med taken more than a month ago Yes Reported, Patient   Cholecalciferol 05174 UNITS TABS Take 1 tablet by mouth three times a week (Mon, Wed, Fri) 5/31/2018 at Unknown time Yes Reported, Patient   Clopidogrel Bisulfate, [id number], (PLAVIX PO) Take 75 mg by mouth daily  5/31/2018 at Unknown time Yes Reported, Patient   DULoxetine (CYMBALTA) 30 MG EC capsule Take 1 capsule (30 mg) by mouth daily 5/31/2018 at Unknown time Yes Matt Morrissey MD   glipiZIDE (GLUCOTROL) 10 MG tablet Take 1 tablet (10 mg) by mouth 2 times daily (before meals) 5/31/2018 at Unknown time Yes Matt Morrissey MD   ipratropium (ATROVENT) 0.03 % spray INHALE 1 SPRAY IN EACH NOSTRIL EVERY 12 HOURS AS NEEDED prn med Yes Matt Morrissey MD   isosorbide mononitrate (IMDUR) 30 MG 24 hr tablet Take 1 tablet (30 mg) by mouth daily 5/31/2018 at am Yes Brandyn Graves,  PA   lisinopril (PRINIVIL/ZESTRIL) 5 MG tablet Take 1 tablet (5 mg) by mouth daily 5/31/2018 at am Yes Brandyn Graves PA   magnesium oxide (MAG-OX) 400 (241.3 Mg) MG tablet TAKE 1 TABLET BY MOUTH TWICE DAILY 5/31/2018 at Unknown time Yes Matt Morrissey MD   METFORMIN HCL PO Take 1,000 mg by mouth 2 times daily (with meals) 5/31/2018 at pm Yes Reported, Patient   metoprolol tartrate (LOPRESSOR) 50 MG tablet Take 1 tablet (50 mg) by mouth 2 times daily 5/31/2018 at am Yes Brandyn Graves PA   nitroGLYcerin (NITROSTAT) 0.4 MG sublingual tablet For chest pain place 1 tablet under the tongue every 5 minutes for 3 doses. If symptoms persist 5 minutes after 1st dose call 911.  Yes Brandyn Graves PA   OMEPRAZOLE PO Take 40 mg by mouth 2 times daily  5/31/2018 at pm Yes Unknown, Entered By History   polyethylene glycol (MIRALAX/GLYCOLAX) Packet Take 17 g by mouth daily as needed for constipation 5/31/2018 Yes Reported, Patient   tamsulosin (FLOMAX) 0.4 MG capsule Take 1 capsule (0.4 mg) by mouth daily 5/31/2018 at Unknown time Yes Matt Morrissey MD   order for DME Equipment being ordered: True Matrix Blood Glucose meter.   Matt Morrissey MD   TRUE METRIX BLOOD GLUCOSE TEST test strip USE TO TEST BLOOD SUGAR THREE TIMES DAILY OR AS DIRECTED   Matt Morrissey MD

## 2018-06-01 NOTE — IP AVS SNAPSHOT
` `     [facility] CARDIAC SPECIALTY CARE: [phone]                 INTERAGENCY TRANSFER FORM - NOTES (H&P, Discharge Summary, Consults, Procedures, Therapies)   2018                    Hospital Admission Date: 2018  SID AGUIAR   : 1928  Sex: Male        Patient PCP Information     Provider PCP Type    Mtat Morrissey MD General    Matt Morrissey MD Internal Medicine         History & Physicals      H&P by Amilcar Sharma MD at 2018  1:40 AM     Author:  Amilcar Sharma MD Service:  Hospitalist Author Type:  Physician    Filed:  2018  3:06 AM Date of Service:  2018  1:40 AM Creation Time:  2018  1:40 AM    Status:  Addendum :  Amilcar Sharma MD (Physician)         [facility]    History and Physical  Hospitalist       Date of Admission:  2018  Date of Service (when I saw the patient): 18    Assessment & Plan   Sid Aguiar is a[RJ1.1] markedly pleasant[RJ1.2] 89 year old[RJ1.1] gentleman with extensive past medical history most notable for CAD, chronic systolic CHF, Diabetes mellitus, moderate aortic stenosis, PAD status post lower extremity angioplasties, mild left carotid artery stenosis, chronic paroxysmal afib, GERD, hypertension and dyslipidemia[RJ1.2] who presents with[RJ1.1] dizziness, nausea and vomiting and chest pain and is found to have hypotension, lactic acidosis, and hypoxia, concerning for severe sepsis of unclear etiology vs possible ACS.    1) Severe sepsis suspected: In the ED Mr. Aguiar is very ill appearing, actively vomiting. Initial BP 89/50, improved to 140/71 with IV fluid bolus. Hypoxic to upper 80 percent range on room air. He is afebrile and pulse 72. CBC showed WBC 9.5, HGB 12, . Lactate is 4.8, improved to 3.5 with IV fluid. CMP unremarkable. D Dimer mildly elevated. CT PA negative for acute pathology as is CXR. EKG shows NSR with ST segment depressions in V3-4; however,  two Troponins are negative, as is Procalcitonin. UA is relatively bland (3 WBC). Blood cultures were sent and he was given anti-emetics as well as Vancomycin and Zosyn.    Overall he could have severe sepsis of unclear cause such as bacteremia or intra-abdominal process. He could have viral gastroenteritis. He was hospitalized here 2/2018 for very similar presentation and found to have E faecalis UTI and could have recurrent UTI. He could have evolving ACS though that seems less likely at present. His wife initially was concerned for stroke but he has no focal neurologic deficits at this time making stroke less likely at present as well. His right leg drag could have been a TIA exacerbated by hypertension (note being made of hospitalization for TIA in 12/2017). Last, review of notes in Epic suggests that he has chronic orthostatic hypotension.     -- IMC    -- Cautious IV fluid 75 cc/hour noting systolic CHF    -- Serial Troponins, TTE ordered. Hold off on Heparin for now unless these turn positive[RJ1.2]. LOw threshold for Cardiology consultation.[RJ1.3]    -- Zosyn continued for now; follow up blood and urine cultures    -- CT abdomen and pelvis ordered in the ED; results pending    -- Q 4 neuro checks; for any concerning changes would call a code stroke (again, noting a non-focal exam in the ED)    -- NPO; Zofran, Compazine, Reglan prn    2) CAD, chronic systolic CHF: He has a history of remote MI. He is followed by [facility]. Most recent TTE 1/2018 showed EF 35-40% with multiple WMA's, and moderate AS and mild aortic root dilation. Recent Nuclear Stress test in 2017 reportedly showed persistent reversible inferior wall ischemia.     -- Resume aspirin, Plavix, Toprol, Lisinopril, and statin when verified and in the case of anti hypertensives, must also verify resolution of hypotension    3) Prior TIA: Plavix was added to aspirin in 12/2017.     4) Type 2 Diabetes mellitus: Hold Metformin and Glipizide, use  ISS insulin while hospitalized. Check A1c.    5) Anemia: Mild, monitor    6) Chronic depression, GERD, BPH: Resume Cymbalta, PPI, when verified; hold Flomax for hypotension[RJ1.2]    # Pain Assessment:  Current Pain Score 5/27/2018   Patient currently in pain? -   Pain score (0-10) 4   Pain location -   Pain descriptors -[RJ1.1]   Dustin hunter pain level was assessed and he currently denies pain.[RJ1.2]      DVT Prophylaxis:[RJ1.1] Pneumatic Compression Devices[RJ1.2]  Code Status:[RJ1.1] Full Code, briefly discussed[RJ1.2]    Disposition: Expected discharge in[RJ1.1] 2-3 days[RJ1.2]    Amilcar Sharma MD    Primary Care Physician    **Matt Morrissey    Chief Complaint[RJ1.1]   Dizziness[RJ1.4]    History is obtained from the patient[RJ1.2], his wife at the bedside,[RJ1.4] and the ED physician whom I have spoken with    History of Present Illness   Dustin Pearce is a[RJ1.1] markedly pleasant[RJ1.4] 89 year old[RJ1.1] gentleman[RJ1.4] who presents with[RJ1.1] dizziness. He says this started tonight after dinner; he and his wife were out at a nearby restaurant and finished the meal, and he got up to leave and got about 10 steps when he developed sudden onset dizziness, and nearly fell down; his wife says she had to support him. She noticed his right leg dragging on the ground. He sat down and the dizziness resolved, then got up again but it recurred and he almost fell again, and this repeated itself several times; at one point, his wife adds that he actually lost consciousness for about 10-15 seconds. Eventually staff at the restaurant brought him a wheelchair and he used this to get into his car, where he developed an episode of nausea and vomiting; his wife brought him into the ED. While here he has vomited multiple more times; it is non-bloody, non-bilious and has now become associated with diffuse chest tightness radiating across his chest (not his back or belly; he denies abdominal pain or diarrhea, or recent  melena or hematochezia). He denies recent fever, chills, sweats, or cough, dyspnea, runny nose, sore throat, or dysuria. His wife lays out all his medications and denies any recent changes. She adds that he has been hospitalized a few times for similar symptoms, most recently over the winter when he was found to have a stroke, and then again 2/2018 when he had a UTI. However he looks much sicker to her tonight than he did then. Overall, he denies any dysarthria or other speech changes, or focal weakness of arm or leg, or loss of sensation or paresthesias, or any other acute complaints.[RJ1.4]    Past Medical History    I have reviewed this patient's medical history and updated it with pertinent information if needed.   Past Medical History:   Diagnosis Date     Aortic root dilatation (H)      Aortic stenosis      Benign essential hypertension 1/6/2017     Benign non-nodular prostatic hyperplasia with lower urinary tract symptoms 10/20/2016     CAD (coronary artery disease)     MI 1970     Cardiomyopathy (H)      Carotid artery stenosis 10/20/2016    chronically occluded left internal carotid artery     Carotid occlusion, left      Coronary artery disease involving native coronary artery of native heart without angina pectoris 10/20/2016     Diabetes mellitus (H)      DJD (degenerative joint disease)      Gastro-oesophageal reflux disease      Gastroesophageal reflux disease without esophagitis 10/20/2016     Heart attack      Hyperlipidemia      Hypertension      Mild cognitive impairment 10/20/2016     Nephrolithiasis     RECURRENT     Osteopenia      PAD (peripheral artery disease) (H)     peripheral angiogram 5/2017: The common iliac artery stenoses were stented and the superficial femoral artery stenoses were angioplastied     Paroxysmal atrial fibrillation (H)      PONV (postoperative nausea and vomiting)      RBBB with left anterior fascicular block      Unspecified cerebral artery occlusion with cerebral  infarction        Past Surgical History   I have reviewed this patient's surgical history and updated it with pertinent information if needed.  Past Surgical History:   Procedure Laterality Date     AMPUTATE FOOT Left 6/4/2017    Procedure: AMPUTATE FOOT;  Sun Add#3: partial left foot amputation - Sagital Saw - Mini C-Arm;  Surgeon: Moe Kirk DPM;  Location:  OR     CHOLECYSTECTOMY       ENDARTERECTOMY CAROTID Right 1/3/2018    Procedure: ENDARTERECTOMY CAROTID;  RIGHT CAROTID ENDARTERECTOMY WITH EEG;  Surgeon: Roland Rodriguez MD;  Location:  OR     ESOPHAGOSCOPY, GASTROSCOPY, DUODENOSCOPY (EGD), COMBINED N/A 12/26/2016    Procedure: COMBINED ESOPHAGOSCOPY, GASTROSCOPY, DUODENOSCOPY (EGD);  Surgeon: Nasim Louis MD;  Location:  GI     GENITOURINARY SURGERY      KIDNEY STONE REMOVAL X 4     HC UGI ENDOSCOPY W EUS N/A 12/29/2016    Procedure: COMBINED ENDOSCOPIC ULTRASOUND, ESOPHAGOSCOPY, GASTROSCOPY, DUODENOSCOPY (EGD);  Surgeon: Nasim Louis MD;  Location:  GI     HERNIA REPAIR       INCISION AND DRAINAGE FOOT, COMBINED Left 6/6/2017    Procedure: COMBINED INCISION AND DRAINAGE FOOT;  REVISIONAL LEFT FOOT INCISION AND DRAINAGE WITH POSSIBLE FLAP CLOSURE , ANTIBIOTIC SOLUTION ;  Surgeon: Nabil Ann DPM;  Location:  OR     LASER HOLMIUM LITHOTRIPSY URETER(S), INSERT STENT, COMBINED  3/2/2012    Procedure:COMBINED CYSTOSCOPY, URETEROSCOPY, LASER HOLMIUM LITHOTRIPSY URETER(S), INSERT STENT; CYSTOSCOPY, RIGHT RETROGRADES, RIGHT URETEROSCOPY, HOLMIUM LASER, RIGHT STENT PLACEMENT; Surgeon:ANJELICA LEÓN; Location: OR     ROTATOR CUFF REPAIR RT/LT         Prior to Admission Medications   Prior to Admission Medications   Prescriptions Last Dose Informant Patient Reported? Taking?   AMITRIPTYLINE HCL PO  Spouse/Significant Other Yes No   Sig: Take 25 mg by mouth nightly as needed for sleep   ATORVASTATIN CALCIUM PO  Spouse/Significant Other Yes No   Sig: Take  40 mg by mouth At Bedtime    Cholecalciferol 56038 UNITS TABS   Yes No   Sig: Take 1 tablet by mouth daily every Sun for deficiancy   Clopidogrel Bisulfate, [id number], (PLAVIX PO)  Spouse/Significant Other Yes No   Sig: Take 75 mg by mouth daily    DULoxetine (CYMBALTA) 30 MG EC capsule  Spouse/Significant Other No No   Sig: Take 1 capsule (30 mg) by mouth daily   LISINOPRIL PO  Spouse/Significant Other Yes No   Sig: Take 2.5 mg by mouth daily    METFORMIN HCL PO  Spouse/Significant Other Yes No   Sig: Take 1,000 mg by mouth 2 times daily (with meals)   OMEPRAZOLE PO  Spouse/Significant Other Yes No   Sig: Take 40 mg by mouth 2 times daily    TRUE METRIX BLOOD GLUCOSE TEST test strip   No No   Sig: USE TO TEST BLOOD SUGAR THREE TIMES DAILY OR AS DIRECTED   amoxicillin (AMOXIL) 875 MG tablet   No No   Sig: Take 1 tablet (875 mg) by mouth 2 times daily   Patient not taking: Reported on 4/25/2018   aspirin EC 81 MG EC tablet  Spouse/Significant Other No No   Sig: Take 1 tablet (81 mg) by mouth daily   bisacodyl (DULCOLAX) 10 MG Suppository   Yes No   Sig: Place 10 mg rectally daily as needed for constipation   glipiZIDE (GLUCOTROL) 10 MG tablet  Spouse/Significant Other Yes No   Sig: Take 1 tablet (10 mg) by mouth 2 times daily (before meals)   ipratropium (ATROVENT) 0.03 % spray  Spouse/Significant Other Yes No   Sig: Spray 2 sprays into both nostrils 2 times daily as needed    magnesium oxide (MAG-OX) 400 (241.3 Mg) MG tablet   No No   Sig: TAKE 1 TABLET BY MOUTH TWICE DAILY   metoprolol (TOPROL-XL) 25 MG 24 hr tablet  Spouse/Significant Other No No   Sig: Take 0.5 tablets (12.5 mg) by mouth daily   ondansetron (ZOFRAN) 4 MG tablet   Yes No   Sig: Take 4 mg by mouth 4 times daily as needed for nausea   order for DME  Spouse/Significant Other No No   Sig: Equipment being ordered: True Matrix Blood Glucose meter.   polyethylene glycol (MIRALAX/GLYCOLAX) Packet  Spouse/Significant Other Yes No   Sig: Take 17 g by mouth  "daily as needed for constipation   tamsulosin (FLOMAX) 0.4 MG capsule  Spouse/Significant Other No No   Sig: Take 1 capsule (0.4 mg) by mouth daily      Facility-Administered Medications: None     Allergies   Allergies   Allergen Reactions     Oxycodone Other (See Comments)     \"TERRIBLE SWEATING\"     Sulfa Drugs      Tetracycline        Social History   I have reviewed this patient's social history and updated it with pertinent information if needed. Dustin Pearce  reports that he quit smoking about 19 years ago. His smoking use included Cigarettes. He has a 30.00 pack-year smoking history. He has never used smokeless tobacco. He reports that he drinks about 4.2 oz of alcohol per week  He reports that he does not use illicit drugs.    Family History   Family history assessed and, except as above, is non-contributory.    Family History   Problem Relation Age of Onset     Breast Cancer Mother      Heart Failure Father 81       Review of Systems   The 10 point Review of Systems is negative other than noted in the HPI or here.     Physical Exam   Temp: 98  F (36.7  C) Temp src: Oral BP: (!) 119/96 (Simultaneous filing. User may not have seen previous data.) Pulse: 72 Heart Rate: 106 Resp: 15 SpO2: 98 % O2 Device: None (Room air)    Vital Signs with Ranges  Temp:  [98  F (36.7  C)] 98  F (36.7  C)  Pulse:  [72] 72  Heart Rate:  [] 106  Resp:  [14-24] 15  BP: ()/(47-96) 119/96  SpO2:  [96 %-100 %] 98 %  0 lbs 0 oz    Constitutional: Alert and oriented to person, place and time; vomiting  HEENT: normocephalic dry mucus membranes  Respiratory: lungs clear to auscultation bilaterally  Cardiovascular: regular S1 S2 2/6 freeman at upper sternum  GI: abdomen soft non tender non distended bowel sounds positive  Lymph/Hematologic: no pallor, no cervical lymphadenopathy  Skin: no rash, good turgor  Musculoskeletal: no clubbing, cyanosis or edema  Neurologic: extra-ocular muscles intact; moves all four " extremities  Psychiatric: appropriate affect, insight and judgment    Data   Data reviewed today:  I personally reviewed the EKG tracing showing NSR with PVC's; ST segment depressions in V3-4.    Recent Labs  Lab 05/27/18  0119 05/26/18  2035   WBC  --  9.5   HGB  --  12.0*   MCV  --  92   PLT  --  189   NA  --  141   POTASSIUM  --  4.4   CHLORIDE  --  106   CO2  --  23   BUN  --  24   CR  --  1.06   ANIONGAP  --  12   ALLISON  --  8.8   GLC  --  183*   ALBUMIN  --  3.5   PROTTOTAL  --  7.0   BILITOTAL  --  0.3   ALKPHOS  --  69   ALT  --  18   AST  --  20   TROPI  --  0.020   TROPONIN 0.00  --        Recent Results (from the past 24 hour(s))   CT Head w/o Contrast    Narrative    CT OF THE HEAD WITHOUT CONTRAST 5/26/2018 9:24 PM     COMPARISON: Brain MR 1/1/2018.    HISTORY: Dizziness tonight, on thinners, syncope.      TECHNIQUE: 5 mm thick axial CT images of the head were acquired  without IV contrast material.    FINDINGS: There is moderate diffuse cerebral volume loss. There are  subtle patchy areas of decreased density in the cerebral white matter  bilaterally that are consistent with sequela of chronic small vessel  ischemic disease. A moderate sized left frontal ischemic infarct is  again noted without change.    The ventricles and basal cisterns are within normal limits in  configuration given the degree of cerebral volume loss.  There is no  midline shift. There are no extra-axial fluid collections.    No intracranial hemorrhage, mass or recent infarct.    The visualized paranasal sinuses are well-aerated. There is no  mastoiditis. There are no fractures of the visualized bones.      Impression    IMPRESSION: Diffuse cerebral volume loss and cerebral white matter  changes consistent with chronic small vessel ischemic disease. No  evidence for acute intracranial pathology.    Radiation dose for this scan was reduced using automated exposure  control, adjustment of the mA and/or kV according to patient size,  or  iterative reconstruction technique.    LISANDRO MAGALLON MD   Chest XR,  PA & LAT    Narrative    CHEST TWO VIEWS    5/26/2018 10:52 PM     COMPARISON: Frontal chest x-ray 8/19/2009.    HISTORY: Hypoxia.    FINDINGS: The cardiac silhouette, pulmonary vasculature, lungs and  pleural spaces are within normal limits.      Impression    IMPRESSION: Clear lungs.    LISANDRO MAGALLON MD   Chest CT, IV contrast only - PE protocol    Narrative    CT CHEST WITH CONTRAST   5/26/2018 11:50 PM     HISTORY: Hypoxia. Dizziness.    COMPARISON: 2/8/2018-CT abdomen and pelvis.    TECHNIQUE: Following the uneventful administration of 64 mL Isovue-370  intravenous contrast, helical sections were acquired through the lungs  according to the pulmonary embolism protocol. Coronal reconstructions  were generated. Radiation dose for this scan was reduced using  automated exposure control, adjustment of the mA and/or kV according  to the patient's size, or iterative reconstruction technique.    FINDINGS: No visualized pulmonary embolus. The thoracic aorta is  normal in caliber without dissection. Atherosclerotic calcification in  the thoracic aorta and coronary arteries.    Minimal emphysematous changes in the lungs. A band-like opacity in the  lateral aspect of the mid left lung likely represents atelectasis or  scarring. The lungs are otherwise clear. No pleural or pericardial  effusion. No enlarged lymph nodes in the chest. Small hiatal hernia.    Scan through the upper abdomen is significant for a few nonobstructing  calculi in both kidneys, largest a 0.6 cm right renal calculus, and a  4.6 cm cyst containing a thin internal septation in the upper pole of  the left kidney.      Impression    IMPRESSION:   1. No visualized pulmonary embolus.  2. No convincing evidence of active pulmonary disease.[RJ1.1]        Revision History        User Key Date/Time User Provider Type Action    > RJ1.3 5/27/2018  3:06 AM Amilcar Sharma MD  Physician Addend     RJ1.2 5/27/2018  2:30 AM Amilcar Sharma MD Physician Sign     RJ1.4 5/27/2018  2:03 AM Amilcar Sharma MD Physician      RJ1.1 5/27/2018  1:40 AM Amilcar Sharma MD Physician                      Discharge Summaries      Discharge Summaries by Brandyn Graves PA at 5/31/2018  3:18 PM     Author:  Brandyn Graves PA Service:  Hospitalist Author Type:  Physician Assistant    Filed:  5/31/2018  3:29 PM Date of Service:  5/31/2018  3:18 PM Creation Time:  5/31/2018  3:18 PM    Status:  Attested :  Brandyn Graves PA (Physician Assistant)    Cosigner:  Pat Garcia MD at 5/31/2018  5:01 PM        Attestation signed by Pat Garcia MD at 5/31/2018  5:01 PM        Physician Attestation   I, Pat Garcia, have reviewed and discussed with the advanced practice provider their discharge plan for Dustin Pearce. I did not participate in a shared visit by interviewing or examining the patient and this should be billed as an advanced practice provider only discharge.    Pat Garcia  Date of Service (5/31/2018): I did not personally see this patient today.                               [facility]    Discharge Summary  Hospitalist    Date of Admission:  5/26/2018  Date of Discharge:  5/31/2018  Discharging Provider: Brandyn Graves  Date of Service (when I saw the patient): 05/31/18    Discharge Diagnoses   NSTEMI   Severe three vessel CAD including  of RCA, severe prox LAD and severe prox LCx disease  S/p PCI to prox LAD and prox LCx  Chronic systolic CHF  SIRS syndrome, resolved  Prior TIA  Type 2 Diabetes mellitus  Anemia  Chronic depression  GERD  BPH    History of Present Illness   Dustin Pearce is a markedly pleasant 89 year old gentleman with extensive past medical history most notable for CAD, chronic systolic CHF, Diabetes mellitus, moderate aortic stenosis, PAD status post lower extremity angioplasties,  mild left carotid artery stenosis, chronic paroxysmal afib, GERD, hypertension and dyslipidemia who presented on 5/26/2018 with dizziness, nausea and vomiting and chest pain and was found to have hypotension, lactic acidosis, and hypoxia.  There was initial concern for severe sepsis of unclear etiology vs possible ACS.  He was admitted for further workup and management.    Please refer to the detailed history and physical from Dr. Amilcar Sharma on 5/27/2018 for more information    Hospital Course   NSTEMI   Severe three vessel CAD including  of RCA, severe prox LAD and severe prox LCx disease   Chronic systolic CHF  Troponin elevation, concern for unstable angina/NSTEMI: He has a history of remote MI. He is followed by [facility]. Most recent TTE 1/2018 showed EF 35-40% with multiple WMA's, and moderate AS and mild aortic root dilation. Recent Nuclear Stress test in 2017 reportedly showed persistent reversible inferior wall ischemia.   -- Cardiology has been consulted, input appreciated.  -- Troponin 2.083-->2.330-->1.656-->1.839  -- Echo with EF of 35-40%.  Mod - severe inferlateral wall hypokinesis, mod inf wall hypokinesis  -- Echo from 1/1/18 had EF of 35-40%, severe hypokinesis to akinesis of entire inf and mid to basal inferolateral wall.  -- Continue Plavix and aspirin.  -- Continue statin  -- Started Imdur 30 mg daily  -- Toprol and lisinopril increased due to elevated BP.  -- Patient and wife originally opted for conservative management. 5/28/18 Pt had an episode of recurrent chest pain associated with recurrent anterolateral ST depression and dynamic inferior T wave changes, which resolved with 2 NTG SL tabs. The troponin level increased slightly overnight from 0.5 to 0.7. Previous peak troponin was 2.3 on 5/27/18. No recurrent chest pain since then.  ---- Pt underwent coronary angiogram 5/30 and had PCI to prox LAD and prox LCx.  RCA total occlusion with L-->R collaterals. Mild to moderate diffuse  disease remains. Very small distal OM branch occluded with collaterals.   -- One episode of NSVT noted this afternoon, asymptomatic. Per cards, his EF does not justify implantation of an ICD for primary prevention, even with episodes of NSVT.  --Doing well after stenting, okay for home per cardiology.  --Outpatient cardiac follow-up and outpatient cardiac rehab.    SIRS syndrome, resolved: On presentation, Mr. Pearce was very ill appearing, actively vomiting. Initial BP 89/50, improved to 140/71 with IV fluid bolus. Hypoxic to upper 80% range on room air. He is afebrile and pulse 50-60's. CBC showed WBC 9.5, HGB 12, . Lactate 4.8, improved to 1.3 with IV fluid. CMP unremarkable. D-Dimer mildly elevated. CT PE negative for acute pathology as is CXR. EKG shows NSR with ST segment depressions in V3-4.  -- Procalcitonin negative  -- UA is relatively bland (3 WBC). Blood cultures no growth to date, urine culture negative.   -- Was initially treated with IV Vancomycin and Zosyn.  -- CT abdomen and pelvis: No cause of acute pain identified in the abdomen or pelvis. Colonic diverticulosis without evidence of diverticulitis    Overall he appeared to have SIRS syndrome of unclear cause.  As no source of infection was found, the likelihood of true sepsis is low. He could have had viral gastroenteritis. He was hospitalized here 2/2018 for very similar presentation and found to have E faecalis UTI.  He does not seem to have a urinary tract infection at this time. His wife was initially was concerned for stroke but he has no focal neurologic deficits at time of admission making stroke less likely. His right leg drag could have been a TIA exacerbated by hypertension (note being made of hospitalization for TIA in 12/2017). Last, review of notes in Epic suggests that he has chronic orthostatic hypotension.  -- At this point it seems most likely that his symptoms may have been due to NSTEMI as above.  -- IV fluids have been  discontinued  -- Antibiotics discontinued   -- Advance diet   -- PT/OT    Prior TIA: Plavix was added to aspirin in 12/2017.   -Patient's wife reports right leg weakness and difficulty with ambulation that she noticed on the day of presentation.  She is unsure if he had previous right leg weakness related to his prior TIA/CVA.  -Noncontrast CT head in the ED is negative  -Patient does not seem to have significant deficits on my exam.  -He ambulated with physical therapy with minimal difficulty and was recommended for outpatient cardiac rehab      Type 2 Diabetes mellitus:  A1c is 9.1.  - Hold Metformin and Glipizide, plan to resume on discharge  - ISS insulin used while hospitalized.  - Follow up with PCP for PO med optimization      Anemia: Mild  - Hgb 10.7-->11.0      Chronic depression, GERD, BPH: Resume Cymbalta, PPI, Flomax upon discharge.     # Discharge Pain Plan:   - Patient currently has NO PAIN and is not being prescribed pain medications on discharge.    Brandyn Graves PA-C      Physical Exam  Temp: 98.6  F (37  C) Temp src: Oral BP: 133/65 Pulse: 61 Heart Rate: 56 Resp: 18 SpO2: 96 % O2 Device: Nasal cannula Oxygen Delivery: 2 LPM        Vitals:     05/29/18 0556 05/30/18 0316 05/31/18 0400   Weight: 74.9 kg (165 lb 1.6 oz) 73.4 kg (161 lb 12.8 oz) 73.1 kg (161 lb 3.2 oz)      Constitutional:  Alert, oriented to person, place, situation.  Cooperative, sitting up in the chair in NAD.   Respiratory:  Lungs CTAB.  No crackles, wheezes, or rhonchi, no labored breathing.  Cardiovascular:  Heart RRR, grade 2/6 systolic murmur at apex, no edema.  GI:  Abdomen soft, NT/ND and with normoactive BS  Skin/Integumen:  Warm, dry, non-diaphoretic.  MSK: CMS x4 intact.  Neuro: Strength 5 out of 5 in all 4 extremities, cranial nerves II through XII grossly intact    Significant Results and Procedures   Coronary angiogram S/p PCI to prox LAD and prox LCx (5/30/2018)  Echo with EF of 35-40%.  Mod - severe  inferlateral wall hypokinesis, mod inf wall hypokinesis    Pending Results   Blood cultures ×2 obtained on 5/26/2018 are no growth to date, final result pending    Code Status   Full Code       Primary Care Physician   Matt Morrissey     Discharge Disposition   Discharged to home  Condition at discharge: Stable    Consultations This Hospital Stay   Cardiology - Dr. Benoit Emerson    Time Spent on this Encounter   I, Brandyn Graves, personally saw the patient today and spent greater than 30 minutes discharging this patient.    Discharge Orders     CARDIAC REHAB REFERRAL     Reason for your hospital stay   3 vessel coronary artery disease s/p stent to LAD and left circumflex     Follow-up and recommended labs and tests    Follow up appointment with Cardiology, ASPEN Whitten on 6/8/18 at 12:30 pm. Also scheduled an appointment with Dr. Samuel on 8/31/18 at 10:45 pm.  Follow up within 7 days with your primary care provider for post-hospitalization check up.     Activity   Your activity upon discharge: activity as tolerated     Discharge Instructions   Do not restart metformin until 6/1     Full Code     Diet   Follow this diet upon discharge:     Combination Diet 3531-1711 Calories: Moderate Consistent CHO (4-6 CHO units/meal)       Discharge Medications[BG1.1]   Current Discharge Medication List      START taking these medications    Details   isosorbide mononitrate (IMDUR) 30 MG 24 hr tablet Take 1 tablet (30 mg) by mouth daily  Qty: 30 tablet, Refills: 1    Associated Diagnoses: Coronary artery disease involving native heart, angina presence unspecified, unspecified vessel or lesion type      metoprolol tartrate (LOPRESSOR) 50 MG tablet Take 1 tablet (50 mg) by mouth 2 times daily  Qty: 60 tablet, Refills: 1    Associated Diagnoses: Coronary artery disease involving native heart, angina presence unspecified, unspecified vessel or lesion type      nitroGLYcerin (NITROSTAT) 0.4 MG sublingual tablet For chest pain  place 1 tablet under the tongue every 5 minutes for 3 doses. If symptoms persist 5 minutes after 1st dose call 911.  Qty: 25 tablet, Refills: 0    Associated Diagnoses: Coronary artery disease involving native heart, angina presence unspecified, unspecified vessel or lesion type         CONTINUE these medications which have CHANGED    Details   lisinopril (PRINIVIL/ZESTRIL) 5 MG tablet Take 1 tablet (5 mg) by mouth daily  Qty: 30 tablet, Refills: 1    Associated Diagnoses: Coronary artery disease involving native heart, angina presence unspecified, unspecified vessel or lesion type         CONTINUE these medications which have NOT CHANGED    Details   AMITRIPTYLINE HCL PO Take 25 mg by mouth nightly as needed for sleep      aspirin EC 81 MG EC tablet Take 1 tablet (81 mg) by mouth daily  Qty: 30 tablet, Refills: 0    Associated Diagnoses: Transient cerebral ischemia, unspecified type      ATORVASTATIN CALCIUM PO Take 40 mg by mouth At Bedtime       bisacodyl (DULCOLAX) 10 MG Suppository Place 10 mg rectally daily as needed for constipation      Cholecalciferol 76643 UNITS TABS Take 1 tablet by mouth three times a week       Clopidogrel Bisulfate, [id number], (PLAVIX PO) Take 75 mg by mouth daily     Associated Diagnoses: Cough      DULoxetine (CYMBALTA) 30 MG EC capsule Take 1 capsule (30 mg) by mouth daily  Qty: 90 capsule, Refills: 3    Associated Diagnoses: Polyneuropathy associated with underlying disease (H)      glipiZIDE (GLUCOTROL) 10 MG tablet Take 1 tablet (10 mg) by mouth 2 times daily (before meals)  Qty: 180 tablet, Refills: 3    Associated Diagnoses: Type 2 diabetes mellitus with diabetic peripheral angiopathy without gangrene, without long-term current use of insulin (H)      magnesium oxide (MAG-OX) 400 (241.3 Mg) MG tablet TAKE 1 TABLET BY MOUTH TWICE DAILY  Qty: 60 tablet, Refills: 0    Associated Diagnoses: Constipation, unspecified constipation type      METFORMIN HCL PO Take 1,000 mg by mouth 2  "times daily (with meals)      OMEPRAZOLE PO Take 40 mg by mouth 2 times daily       polyethylene glycol (MIRALAX/GLYCOLAX) Packet Take 17 g by mouth daily as needed for constipation      tamsulosin (FLOMAX) 0.4 MG capsule Take 1 capsule (0.4 mg) by mouth daily  Qty: 90 capsule, Refills: 3    Associated Diagnoses: Benign non-nodular prostatic hyperplasia with lower urinary tract symptoms      ipratropium (ATROVENT) 0.03 % spray INHALE 1 SPRAY IN EACH NOSTRIL EVERY 12 HOURS AS NEEDED  Qty: 30 mL, Refills: 0    Associated Diagnoses: Runny nose      order for DME Equipment being ordered: True Matrix Blood Glucose meter.  Qty: 1 Device, Refills: 0    Associated Diagnoses: Type 2 diabetes mellitus without complication (H)      TRUE METRIX BLOOD GLUCOSE TEST test strip USE TO TEST BLOOD SUGAR THREE TIMES DAILY OR AS DIRECTED  Qty: 300 strip, Refills: 0    Associated Diagnoses: Hypoglycemia         STOP taking these medications       metoprolol (TOPROL-XL) 25 MG 24 hr tablet Comments:   Reason for Stopping:[BG1.2]             Allergies   Allergies   Allergen Reactions     Oxycodone Other (See Comments)     \"TERRIBLE SWEATING\"     Sulfa Drugs      Tetracycline      Data   Most Recent 3 CBC's:  Recent Labs   Lab Test  05/31/18   0600  05/30/18   0540  05/29/18   0635   WBC  8.5  9.1  7.5   HGB  10.5*  11.0*  11.1*   MCV  91  90  92   PLT  148*  166  165      Most Recent 3 BMP's:  Recent Labs   Lab Test  05/31/18   0600  05/30/18   0540  05/29/18   0635   NA  139  138  140   POTASSIUM  4.3  4.2  4.9   CHLORIDE  105  104  106   CO2  30  25  28   BUN  10  12  8   CR  0.81  0.68  0.82   ANIONGAP  4  9  6   ALLISON  8.6  9.0  8.8   GLC  200*  211*  187*     Most Recent 2 LFT's:  Recent Labs   Lab Test  05/27/18   0710  05/26/18   2035   AST  18  20   ALT  15  18   ALKPHOS  60  69   BILITOTAL  0.3  0.3     Most Recent INR's and Anticoagulation Dosing History:  Anticoagulation Dose History     Recent Dosing and Labs Latest Ref Rng & " Units 8/19/2009 1/20/2017 12/31/2017    INR 0.86 - 1.14 0.99 1.03 0.99        Most Recent 3 Troponin's:  Recent Labs   Lab Test  05/29/18   0635  05/29/18   0320  05/28/18   2310   05/27/18   0119   TROPI  0.651*  0.707*  0.542*   < >   --    TROPONIN   --    --    --    --   0.00    < > = values in this interval not displayed.     Most Recent Cholesterol Panel:  Recent Labs   Lab Test  12/31/17   1425   CHOL  133   LDL  61   HDL  43   TRIG  147     Most Recent 6 Bacteria Isolates From Any Culture (See EPIC Reports for Culture Details):  Recent Labs   Lab Test  05/26/18   2246  05/26/18   2135  03/12/18   1454  02/08/18   0218  06/06/17   1846  06/04/17   1027   CULT  No growth  No growth after 5 days  No growth after 5 days  >100,000 colonies/mL  Enterococcus faecalis  *  >100,000 colonies/mL  Enterococcus faecalis  *  >100,000 colonies/mL  Strain 2  Enterococcus faecalis  *  No anaerobes isolated  No growth  No anaerobes isolated  Moderate growth Staphylococcus aureus*     Most Recent TSH, T4 and A1c Labs:  Recent Labs   Lab Test  05/27/18   0710   12/31/17   1425   10/20/16   1825   TSH   --    --   2.16   < >  4.63*   T4   --    --    --    --   0.94   A1C  9.1*   < >  8.6*   < >  8.5*    < > = values in this interval not displayed.     Results for orders placed or performed during the hospital encounter of 05/26/18   CT Head w/o Contrast    Narrative    CT OF THE HEAD WITHOUT CONTRAST 5/26/2018 9:24 PM     COMPARISON: Brain MR 1/1/2018.    HISTORY: Dizziness tonight, on thinners, syncope.      TECHNIQUE: 5 mm thick axial CT images of the head were acquired  without IV contrast material.    FINDINGS: There is moderate diffuse cerebral volume loss. There are  subtle patchy areas of decreased density in the cerebral white matter  bilaterally that are consistent with sequela of chronic small vessel  ischemic disease. A moderate sized left frontal ischemic infarct is  again noted without change.    The  ventricles and basal cisterns are within normal limits in  configuration given the degree of cerebral volume loss.  There is no  midline shift. There are no extra-axial fluid collections.    No intracranial hemorrhage, mass or recent infarct.    The visualized paranasal sinuses are well-aerated. There is no  mastoiditis. There are no fractures of the visualized bones.      Impression    IMPRESSION: Diffuse cerebral volume loss and cerebral white matter  changes consistent with chronic small vessel ischemic disease. No  evidence for acute intracranial pathology.    Radiation dose for this scan was reduced using automated exposure  control, adjustment of the mA and/or kV according to patient size, or  iterative reconstruction technique.    LISANDRO MAGALLON MD   Chest XR,  PA & LAT    Narrative    CHEST TWO VIEWS    5/26/2018 10:52 PM     COMPARISON: Frontal chest x-ray 8/19/2009.    HISTORY: Hypoxia.    FINDINGS: The cardiac silhouette, pulmonary vasculature, lungs and  pleural spaces are within normal limits.      Impression    IMPRESSION: Clear lungs.    LISANDRO MAGALLON MD   Chest CT, IV contrast only - PE protocol    Narrative    CT CHEST WITH CONTRAST   5/26/2018 11:50 PM     HISTORY: Hypoxia. Dizziness.    COMPARISON: 2/8/2018 - CT abdomen and pelvis.    TECHNIQUE: Following the uneventful administration of 64 mL Isovue-370  intravenous contrast, helical sections were acquired through the lungs  according to the pulmonary embolism protocol. Coronal reconstructions  were generated. Radiation dose for this scan was reduced using  automated exposure control, adjustment of the mA and/or kV according  to the patient's size, or iterative reconstruction technique.    FINDINGS: No visualized pulmonary embolus. The thoracic aorta is  normal in caliber without dissection. Atherosclerotic calcification in  the thoracic aorta and coronary arteries.    Minimal emphysematous changes in the lungs. A band-like opacity in  the  lateral aspect of the mid left lung likely represents atelectasis or  scarring. The lungs are otherwise clear. No pleural or pericardial  effusion. No enlarged lymph nodes in the chest. Small hiatal hernia.    Scan through the upper abdomen is significant for a few nonobstructing  calculi in both kidneys, largest a 0.6 cm right renal calculus, and a  4.6 cm cyst containing a thin internal septation in the upper pole of  the left kidney.      Impression    IMPRESSION:   1. No visualized pulmonary embolus.  2. No convincing evidence of active pulmonary disease.    MEME PFEIFFER MD   CT Abdomen Pelvis w/o Contrast    Narrative    CT ABDOMEN AND PELVIS WITHOUT CONTRAST   5/27/2018 2:14 AM     HISTORY: Nausea and vomiting.    COMPARISON: 2/8/2018.    TECHNIQUE: Without intravenous contrast, helical sections were  acquired from the top of the diaphragm through the pubic symphysis.  Coronal reconstructions were generated. Radiation dose for this scan  was reduced using automated exposure control, adjustment of the mA  and/or kV according to the patient's size, or iterative reconstruction  technique.    FINDINGS:     Abdomen: The liver, spleen, pancreas and adrenal glands are  unremarkable. 4.4 cm cyst containing a thin internal septation in the  upper pole of the left kidney. Excreted contrast material from a  recent CT scan present within the renal collecting systems and  ureters. The gallbladder is not visualized. Small hiatal hernia. No  enlarged lymph nodes or free fluid in the upper abdomen.  Atherosclerotic calcification in the abdominal aorta.    Scan through the lower chest is unremarkable.    Pelvis: The small and large bowel are normal in caliber. Several  colonic diverticula are present without evidence of diverticulitis.  The appendix is unremarkable. No bowel wall thickening, pneumatosis or  free intraperitoneal gas. 1 cm diverticulum from the posterior right  aspect of the urinary bladder. No enlarged  lymph nodes or free fluid  in the pelvis. Bilateral L5 pars interarticularis defects.      Impression    IMPRESSION:   1. No cause of acute pain identified in the abdomen or pelvis.  2. Colonic diverticulosis without evidence of diverticulitis.    MEME PFEIFFER MD[BG1.1]          Revision History        User Key Date/Time User Provider Type Action    > BG1.2 5/31/2018  3:29 PM Brandyn Graevs PA Physician Assistant Sign     BG1.1 5/31/2018  3:18 PM Brandyn Graves PA Physician Assistant                      Consult Notes      Consults by Fatemeh Lopez LICSW at 6/3/2018 11:20 AM     Author:  Fatemeh Lopez LICSW Service:  Social Work Author Type:      Filed:  6/3/2018 11:20 AM Date of Service:  6/3/2018 11:20 AM Creation Time:  6/3/2018 11:09 AM    Status:  Signed :  Fatemeh Lopez LICSW ()     Consult Orders:    1. Social Work IP Consult [[id number]] ordered by Pat Garcia MD at 06/02/18 0753                Care Transition Initial Assessment -   Reason For Consult: discharge planning  Met with: Patient and Family    Active Problems:    Chest discomfort       DATA  Lives With: spouse  Living Arrangements: house  Description of Support System: Supportive, Involved  Who is your support system?: Wife  Support Assessment: Adequate family and caregiver support.   Identified issues/concerns regarding health management:       Quality Of Family Relationships: supportive, involved  Transportation Available: family or friend will provide    Per social service protocol for discharge planning.  Patient was admitted on 6-1-18 with chest discomfort.  The tentative date of discharge is 6-3-18.  Patient is admitted under observation status.  Reviewed chart and spoke with patient and significant other regarding discharge plans.  Per patient and significant other report, they live in a house with 3 steps to enter and 7 steps inside.  Patient uses a straight cane at  baseline.  Patient has a shower chair and a raised toilet seat in the bathroom.  Patient is independent with ADL's.  Patient's significant other assists with IADL's.  Reviewed the therapy discharge recommendations of tcu placement on discharge and patient and significant other are in agreement.  Patient had a recent admission and has an inpatient hospital stay from May 27 - May 31 therefore his tcu stay will be covered under Medicare.  Patient's significant other would like a referral sent to [facility].  Explained that I spoke with [facility] earlier in the day and they have no available beds.  Patient is now asking for a referral to be sent to [facility].  Patient's significant other is asking for a private room.  Explained that there is a $39 per day amenity fee and patient and significant other are in agreement.  Referral sent, vis discharge on the double, to check bed availability.  Received a call back from [facility].  They can accept patient today into a private room.  They would like transport arranged for around 13:00 today.      ASSESSMENT  Cognitive Status:  awake and alert  Concerns to be addressed: discharge planning.     PLAN  Financial costs for the patient includes Private room amenity fees.  Patient given options and choices for discharge TCU choices.  Patient/family is agreeable to the plan?  Yes  Patient Goals and Preferences: TCU on discharge.  Patient anticipates discharging to:  [facility].    Will continue to follow and assist with a safe discharge plan.[SJ1.1]    Fatemeh Lopez[SJ1.2], REUBEN CIFUENTES  [phone][SJ1.1]           Revision History        User Key Date/Time User Provider Type Action    > SJ1.2 6/3/2018 11:20 AM Fatemeh Lopez LICSW  Sign     SJ1.1 6/3/2018 11:09 AM Fatemeh Lopez LICSW              Consults by Janis Greene PA-C at 6/1/2018  3:04 PM     Author:  Janis Greene PA-C Service:  Cardiology Author Type:   Physician Assistant - C    Filed:  6/1/2018  3:58 PM Date of Service:  6/1/2018  3:04 PM Creation Time:  6/1/2018  3:03 PM    Status:  Attested :  Janis Greene PA-C (Physician Assistant - C)    Cosigner:  Leatha Price MD at 6/1/2018  6:14 PM         Consult Orders:    1. Cardiology IP Consult: Patient to be seen: Routine - within 24 hours; Chest pain; recent stent; Consultant may enter orders: Yes [[id number]] ordered by Bronson Whitaker MD at 06/01/18 1249           Attestation signed by Leatha Price MD at 6/1/2018  6:14 PM        Physician Attestation   IDEE ERIC RYAN, saw and evaluated Dustin Pearce as part of a shared visit.  I have reviewed and discussed with the advanced practice provider their history, physical and plan.    I personally reviewed the vital signs, medications, labs and imaging.    My key history or physical exam findings: Mr Pearce is well known to me from his recent hospitalization. He was discharged yesterday. He had prolonged epigastric discomfort last night after getting back home and came back to the ER. Troponins continue to trend down. He is pain-free. Heart RRR. Lungs clear. Abdomen benign.     Key management decisions made by me: Likely non-cardiac epigastric discomfort in a patient with recent NSTEMI and multivessel stenting. No further cardiac work up recommended at this time unless more objective evidence for CAD/ischemia arises. Probably home tomorrow.     LEATHA PRICE  Date of Service (when I saw the patient): 06/01/18                               [facility]    Cardiology Consultation    Date of Admission:  6/1/2018[AL1.1]  Assessment & Plan[AL1.2]   Dustin Pearce is a 89 year old male who was admitted on 6/1/2018. I was asked to see the patient for recurrent chest pain in the setting of recent stenting.    Summary:[AL1.1]   1.  Chest/epigastric discomfort.   2.  CAD with severe three vessel disease s/p stenting of the  LAD and LCx on 5/30.   -  Continue aspirin, plavix, statin, and imdur.   3.  Ischemic cardiomyopathy  LVEF 35 - 40%.   -  Continue metoprolol, lisinopril. He has no required diuretic therapy.   4.  Hypertension.   5.  DM type II.   6.  Anemia.    Impression/plan:  Dustin presents with several hours of epigastric discomfort in the setting of recent intervention and drug eluting stent placement to his LAD and LCx on 5/30. He tells me this pain is entirely different from the pain he was experiencing last hospitalization, although he cannot describe this pain for me. On exam, it seems to be more localized to the epigastric region although he is not significantly tender.  His LFTs and lipase were unremarkable.    His troponin levels are trending down from .651 on 5/29 to 0.130. Repeat levels have been flat. At this point his pain seems non-cardiac in nature. ? Gastritis. We will continue to follow his troponin levels and monitor for any recurrence of his pain. We will also watch him on telemetry.  If his troponin levels were to rise or he were to have convincing recurrent pain, we could consider repeat coronary angiography but would not pursue this at this point.    He should continue on aspirin, plavix, lisinopril, and metoprolol. If his BPs remain elevated, we could increase his lisinopril. If he has no recurrent symptoms and troponins remain flat/trending, he can likely be discharged this weekend. Cardiology follow up has already been arranged in the clinic for next week.[AL1.3]     A[AL1.2]maribel Greene PA-C[AL1.1]    Reason for Consult[AL1.2]   Reason for consult: I was asked by Dr. Whitaker to evaluate this patient for chest discomfort.[AL1.1]    Primary Care Physician   Matt Morrissey    Chief Complaint[AL1.2]   Chest pain    History is obtained from the patient and wife.[AL1.1]    History of Present Illness[AL1.2]   Dustin Pearce is a 89 year old male with a history of chronic systolic CHF with a LVEF of 35 -  40%, moderate AS, DM, PA s/p LE angioplasty/stenting, paroxysmal atrial fibrillation, HTN, dyslipidemia who was admitted today with chest discomfort. He had an abnormal stress test in March of 2017 which showed a evidence of a prior MI in the RCA/circumflex distribution with mild ezekiel-infarct ischemia. Continued medical management was recommended. He has followed with Dr. Samuel in clinic and was last seen in December.     He was recently admitted on 5/26/18 with SIRS syndrome and chest discomfort. He was hypotensive and hypoxic on admission. Infectious work up was negative. Troponin levels were elevated up to 2.3. EKG showed some new ST depression in the lateral leads. Echocardiogram showed a stable LVEF of 35 - 40% with moderate to severe inferolateral wall hypokinesis and moderate inferior wall hypokinesis. Cardiology was consulted. His troponin elevation was felt to likely represent demand ischemia in the setting of sepsis. As he was asymptomatic without chest discomfort, family wanted to pursue more conservative management and hold off on an angiogram.    On 5/29, he developed recurrent chest discomfort associated with recurrent anterolateral ST depression and inferior T wave changes. His pain resolved with nitroglycerin. Troponin increased from 0.5 to 0.7. After discussion, he agreed to coronary angiography.     This was done on 5/30. He had severe disease in the proximal LAD and LCx with 90% stenosis. The RCA is occluded with large left to right collaterals. A very small distal OM branch was occluded with collaterals. He underwent stenting of the LAD and LCx with good results. Mild to moderate diffuse disease remains.     He did well with cardiac rehab without recurrent symptoms. He did have a short run of NSVT during cardiac rehab which was asymptomatic. He was discharged home yesterday, 5/31, on metoprolol, imdur, lisinopril, aspirin and plavix.[AL1.1]     History is obtained from the patient and his wife. He  is a fair historian at best. Last night, he told his wife around 9 pm that he had developed some chest discomfort. He was able to go to sleep last night but his pain persisted when he woke up this morning. When I ask where his pain is he points to his epigastric region. The pain persisted for several hours this morning but has now resolved. It did not improve much with nitroglycerin. He tells me that it is completely different from the pain he was experiencing prior to his stents although when I ask him to explain this pain he can give me no details about it.     In addition to his pain he perhaps noted some mild shortness of breath when walking to the bathroom this morning. He also thinks he was a little short of breath last night when lying in bed. He had no SOB with cardiac rehab yesterday. No nausea, vomiting, PND, or peripheral edema.[AL1.3]     Past Medical History    Past Medical History:   Diagnosis Date     Aortic root dilatation (H)      Aortic stenosis      Benign essential hypertension 1/6/2017     Benign non-nodular prostatic hyperplasia with lower urinary tract symptoms 10/20/2016     CAD (coronary artery disease)     MI 1970     Cardiomyopathy (H)      Carotid artery stenosis 10/20/2016    chronically occluded left internal carotid artery     Carotid occlusion, left      Coronary artery disease involving native coronary artery of native heart without angina pectoris 10/20/2016     Diabetes mellitus (H)      DJD (degenerative joint disease)      Gastro-oesophageal reflux disease      Gastroesophageal reflux disease without esophagitis 10/20/2016     Heart attack      Hyperlipidemia      Hypertension      Mild cognitive impairment 10/20/2016     Nephrolithiasis     RECURRENT     Osteopenia      PAD (peripheral artery disease) (H)     peripheral angiogram 5/2017: The common iliac artery stenoses were stented and the superficial femoral artery stenoses were angioplastied     Paroxysmal atrial fibrillation  (H)      PONV (postoperative nausea and vomiting)      RBBB with left anterior fascicular block      Unspecified cerebral artery occlusion with cerebral infarction        Past Surgical History   Past Surgical History:   Procedure Laterality Date     AMPUTATE FOOT Left 6/4/2017    Procedure: AMPUTATE FOOT;  Sun Add#3: partial left foot amputation - Sagital Saw - Mini C-Arm;  Surgeon: Moe Kirk DPM;  Location:  OR     CHOLECYSTECTOMY       ENDARTERECTOMY CAROTID Right 1/3/2018    Procedure: ENDARTERECTOMY CAROTID;  RIGHT CAROTID ENDARTERECTOMY WITH EEG;  Surgeon: Roland Rodriguez MD;  Location:  OR     ESOPHAGOSCOPY, GASTROSCOPY, DUODENOSCOPY (EGD), COMBINED N/A 12/26/2016    Procedure: COMBINED ESOPHAGOSCOPY, GASTROSCOPY, DUODENOSCOPY (EGD);  Surgeon: Nasim Louis MD;  Location:  GI     GENITOURINARY SURGERY      KIDNEY STONE REMOVAL X 4     HC UGI ENDOSCOPY W EUS N/A 12/29/2016    Procedure: COMBINED ENDOSCOPIC ULTRASOUND, ESOPHAGOSCOPY, GASTROSCOPY, DUODENOSCOPY (EGD);  Surgeon: Nasim Louis MD;  Location:  GI     HERNIA REPAIR       INCISION AND DRAINAGE FOOT, COMBINED Left 6/6/2017    Procedure: COMBINED INCISION AND DRAINAGE FOOT;  REVISIONAL LEFT FOOT INCISION AND DRAINAGE WITH POSSIBLE FLAP CLOSURE , ANTIBIOTIC SOLUTION ;  Surgeon: Nabil Ann DPM;  Location:  OR     LASER HOLMIUM LITHOTRIPSY URETER(S), INSERT STENT, COMBINED  3/2/2012    Procedure:COMBINED CYSTOSCOPY, URETEROSCOPY, LASER HOLMIUM LITHOTRIPSY URETER(S), INSERT STENT; CYSTOSCOPY, RIGHT RETROGRADES, RIGHT URETEROSCOPY, HOLMIUM LASER, RIGHT STENT PLACEMENT; Surgeon:ANJELICA LEÓN; Location: OR     ROTATOR CUFF REPAIR RT/LT         Prior to Admission Medications   Prior to Admission Medications   Prescriptions Last Dose Informant Patient Reported? Taking?   AMITRIPTYLINE HCL PO Past Month at more than 2 weeks ago Spouse/Significant Other Yes Yes   Sig: Take 25 mg by mouth nightly as  needed for sleep   ATORVASTATIN CALCIUM PO 5/31/2018 at pm Spouse/Significant Other Yes Yes   Sig: Take 40 mg by mouth At Bedtime    Cholecalciferol 20653 UNITS TABS 5/31/2018 at Unknown time Spouse/Significant Other Yes Yes   Sig: Take 1 tablet by mouth three times a week (Mon, Wed, Fri)   Clopidogrel Bisulfate, [id number], (PLAVIX PO) 5/31/2018 at Unknown time Spouse/Significant Other Yes Yes   Sig: Take 75 mg by mouth daily    DULoxetine (CYMBALTA) 30 MG EC capsule 5/31/2018 at Unknown time Spouse/Significant Other No Yes   Sig: Take 1 capsule (30 mg) by mouth daily   METFORMIN HCL PO 5/31/2018 at pm Spouse/Significant Other Yes Yes   Sig: Take 1,000 mg by mouth 2 times daily (with meals)   OMEPRAZOLE PO 5/31/2018 at pm Spouse/Significant Other Yes Yes   Sig: Take 40 mg by mouth 2 times daily    TRUE METRIX BLOOD GLUCOSE TEST test strip  Spouse/Significant Other No No   Sig: USE TO TEST BLOOD SUGAR THREE TIMES DAILY OR AS DIRECTED   aspirin EC 81 MG EC tablet 5/31/2018 at Unknown time Spouse/Significant Other No Yes   Sig: Take 1 tablet (81 mg) by mouth daily   bisacodyl (DULCOLAX) 10 MG Suppository prn med taken more than a month ago Spouse/Significant Other Yes Yes   Sig: Place 10 mg rectally daily as needed for constipation   glipiZIDE (GLUCOTROL) 10 MG tablet 5/31/2018 at Unknown time Spouse/Significant Other Yes Yes   Sig: Take 1 tablet (10 mg) by mouth 2 times daily (before meals)   ipratropium (ATROVENT) 0.03 % spray prn med Spouse/Significant Other No Yes   Sig: INHALE 1 SPRAY IN EACH NOSTRIL EVERY 12 HOURS AS NEEDED   isosorbide mononitrate (IMDUR) 30 MG 24 hr tablet 5/31/2018 at am Spouse/Significant Other No Yes   Sig: Take 1 tablet (30 mg) by mouth daily   lisinopril (PRINIVIL/ZESTRIL) 5 MG tablet 5/31/2018 at am Spouse/Significant Other No Yes   Sig: Take 1 tablet (5 mg) by mouth daily   magnesium oxide (MAG-OX) 400 (241.3 Mg) MG tablet 5/31/2018 at Unknown time Spouse/Significant Other No Yes   Sig:  "TAKE 1 TABLET BY MOUTH TWICE DAILY   metoprolol tartrate (LOPRESSOR) 50 MG tablet 5/31/2018 at am Spouse/Significant Other No Yes   Sig: Take 1 tablet (50 mg) by mouth 2 times daily   nitroGLYcerin (NITROSTAT) 0.4 MG sublingual tablet  Spouse/Significant Other No Yes   Sig: For chest pain place 1 tablet under the tongue every 5 minutes for 3 doses. If symptoms persist 5 minutes after 1st dose call 911.   order for DME  Spouse/Significant Other No No   Sig: Equipment being ordered: True Matrix Blood Glucose meter.   polyethylene glycol (MIRALAX/GLYCOLAX) Packet 5/31/2018 Spouse/Significant Other Yes Yes   Sig: Take 17 g by mouth daily as needed for constipation   tamsulosin (FLOMAX) 0.4 MG capsule 5/31/2018 at Unknown time Spouse/Significant Other No Yes   Sig: Take 1 capsule (0.4 mg) by mouth daily      Facility-Administered Medications: None     Allergies   Allergies   Allergen Reactions     Oxycodone Other (See Comments)     \"TERRIBLE SWEATING\"     Sulfa Drugs      Tetracycline        Social History[AL1.2]   . Former smoker, quit in 1999. 1 alcoholic beverage per week. No drug use.[AL1.3]    Family History   Family History   Problem Relation Age of Onset     Breast Cancer Mother      Heart Failure Father 81       Review of Systems[AL1.2]   The 10 point Review of Systems is negative other than noted in the HPI or here.[AL1.1]    Physical Exam   Temp: 97.8  F (36.6  C) Temp src: Oral BP: 136/81 Pulse: 75 Heart Rate: 75 Resp: 16 SpO2: 96 % O2 Device: None (Room air)[AL1.2]    Vital Signs with Ranges[AL1.1]  Temp:  [97.8  F (36.6  C)-99.1  F (37.3  C)] 97.8  F (36.6  C)  Pulse:  [75] 75  Heart Rate:  [67-76] 75  Resp:  [11-20] 16  BP: (136-177)/(65-83) 136/81  SpO2:  [94 %-96 %] 96 %  160 lbs 3.2 oz[AL1.2]    Constitutional: Alert, no acute distress.  Eyes: sclera anicteric  Respiratory: No respiratory distress. Breath sounds are CTAB. No wheezes, rhonchi, or rales[AL1.1].[AL1.3]  Cardiovascular: Regular rate " and rhythm. Normal S1, S2. No murmurs, rubs, or gallops.  GI: abdomen soft.  Skin: warm and dry.   Musculoskeletal:[AL1.1] No[AL1.3] LE edema bilaterally. Viktoria  Neurologic: alert and oriented x 3.  Psychiatric: affect appropriate.[AL1.1]     Data[AL1.2]   -Data reviewed today: All pertinent laboratory and imaging results from this encounter were reviewed. I personally reviewed[AL1.1] the EKG tracing showing NSR[AL1.3].[AL1.1]    Recent Labs  Lab 06/01/18  1015 06/01/18  0810 05/31/18  0600 05/30/18  0540 05/29/18  0635  05/27/18  0710  05/27/18  0119   WBC  --  7.5 8.5 9.1 7.5  < > 7.6  --   --    HGB  --  10.5* 10.5* 11.0* 11.1*  < > 10.7*  --   --    MCV  --  91 91 90 92  < > 92  --   --    PLT  --  155 148* 166 165  < > 144*  --   --    NA  --  142 139 138 140  < > 142  --   --    POTASSIUM  --  4.1 4.3 4.2 4.9  < > 4.8  --   --    CHLORIDE  --  106 105 104 106  < > 112*  --   --    CO2  --  29 30 25 28  < > 25  --   --    BUN  --  15 10 12 8  < > 20  --   --    CR  --  0.70 0.81 0.68 0.82  < > 0.84  --   --    ANIONGAP  --  7 4 9 6  < > 5  --   --    ALLISON  --  9.1 8.6 9.0 8.8  < > 8.0*  --   --    GLC  --  224* 200* 211* 187*  < > 195*  --   --    ALBUMIN  --  2.9*  --   --   --   --  2.9*  --   --    PROTTOTAL  --  6.6*  --   --   --   --  5.8*  --   --    BILITOTAL  --  0.6  --   --   --   --  0.3  --   --    ALKPHOS  --  67  --   --   --   --  60  --   --    ALT  --  14  --   --   --   --  15  --   --    AST  --  15  --   --   --   --  18  --   --    LIPASE  --  104  --   --   --   --   --   --   --    TROPI 0.133* 0.130*  --   --  0.651*  < > 0.898*  < >  --    TROPONIN  --   --   --   --   --   --   --   --  0.00   < > = values in this interval not displayed.[AL1.2]    Telemetry[AL1.1] NSR[AL1.3]    Imaging:[AL1.1]  Recent Results (from the past 24 hour(s))   XR Chest 2 Views    Narrative    XR CHEST 2 VW 6/1/2018 8:22 AM    HISTORY: Pain.    COMPARISON: May 26, 2018.      Impression    IMPRESSION: Stable  "subtle opacification of the left lateral lung base,  likely atelectasis or scarring as before. No new focal pulmonary  opacities otherwise. No pleural effusions or pneumothorax. Stable  heart and mediastinum.    GALDINO LANDERS MD[AL1.2]        Revision History        User Key Date/Time User Provider Type Action    > AL1.3 6/1/2018  3:58 PM Janis Greene PA-C Physician Assistant - C Sign     AL1.2 6/1/2018  3:04 PM Janis Greene PA-C Physician Assistant - C      AL1.1 6/1/2018  3:03 PM Janis Greene PA-C Physician Assistant - C             Consults by Shanell Conway at 5/31/2018  9:15 AM     Author:  Shanell Conway Service:  Nutrition Author Type:  Dietician    Filed:  5/31/2018 10:16 AM Date of Service:  5/31/2018  9:15 AM Creation Time:  5/31/2018  9:14 AM    Status:  Attested :  Shanell Conway (Dietician)    Cosigner:  Deborah Paige RD, LD at 5/31/2018  1:32 PM         Consult Orders:    1. Nutrition Services Adult IP Consult [[id number]] ordered by Khanh Mukherjee MD at 05/30/18 1310           Attestation signed by Deborah Paige RD, LD at 5/31/2018  1:32 PM        I have reviewed and agree with the note below.      Deborah Paige RD, LD, [facility]                               NUTRITION EDUCATION    REASON FOR ASSESSMENT:  Nutrition education on American Heart Association (AHA) Heart Healthy Diet.    NUTRITION HISTORY:  Information obtained from patient and wife:   - Pt consumes a regular diet at home, consuming meals TID. Pt provides meals for himself during breakfast and lunch, and wife provides dinner. The couple goes out to dinner everyday Saturday after Mass.   - Typical intake includes: Breakfast - oatmeal or cold cereal with fruit and 1% milk. Lunch - fruit and or cottage cheese (light meal). Dinner- at home: chicken or turkey dish, out to eat: steak, shrimp, stir schulte  - Wife states that \"we do not eat a lot of fatty foods.\" She uses margarine " instead of butter for cooking.    - Pt has h/o Type 2 DM and requests handouts[AM1.1] and education[AM1.2] on carbohydrates    CURRENT DIET ORDER:  Mod-CHO    NUTRITION DIAGNOSIS:  Food- and nutrition-related knowledge deficit R/t no previous formal nutrition education on a heart-healthy diet    INTERVENTIONS:  Nutrition Prescription:    Recommended AHA Heart Healthy Diet and Mod-CHO Diet     Implementation:     Nutrition Education (Content):  a) reviewed AHA Heart Healthy Diet guidelines  b) provided additional Healthy Heart diet handouts: The Mediterranean Diet,[AM1.1] Understanding Diabetes Booklet.    Nutrition[AM1.2] Education (Application):  a) Discussed current eating habits and recommended alternative food choices[AM1.1]. Discussed CHO content in food and consuming consistent amounts throughout the day.[AM1.2]     Anticipate good compliance    Diet Education - refer to Education flowsheet    Goals:    Patient verbalizes understanding of diet by asking appropriate questions and providing examples of how he already practices some of these guidelines    All of the above goals met during education session    Follow Up/Monitoring:    RD will be available for future questions          Shanell Conway Dietitian[AM1.1]        Revision History        User Key Date/Time User Provider Type Action    > AM1.2 5/31/2018 10:16 AM Shanell Conway Dietician Sign     AM1.1 5/31/2018  9:14 AM Shanell Conway Dietician             Consults by Lucie Soto MD at 5/27/2018 12:53 PM     Author:  Lucie Soto MD Service:  Cardiology Author Type:  Physician    Filed:  5/27/2018  9:16 PM Date of Service:  5/27/2018 12:53 PM Creation Time:  5/27/2018 12:47 PM    Status:  Addendum :  Lucie Soto MD (Physician)         [facility]    Cardiology Consultation     Date of Admission:  5/26/2018    Assessment & Plan   Dustin Pearce is a 89 year old male who was admitted on 5/26/2018. I was asked to see the  patient for troponin elevation in setting of sepsis, chronic systolic CHF, moderate AS, diabetes mellitus, s/p lower extremity angioplasty, chronic paroxysmal afib, GERD, hypertension, dyslipidemia admitted for CP, hypotension, hypoxia, lactic acidosis being managed for sepsis. We are consulted regarding troponin elevation.     Given absence of symptoms, it is likely that trop elevation represents demand ischemia.[MP1.1] ECG does show new ST depressions in lateral leads. Discussed possibility of posterior STEMI but given similar echo findings and continued asympomatic nature, on top of ongoing active sepsis and his desire for more conservative management recommend treatment of underlying sepsis.      CT[MP1.2]PE negative for PE. HE is beig managed with fluid resuscitation, antibiotics for sepsis secodnary to viral gastroenteritis vs. UTI given history of E. Faecalis UTI 2/2018.     1) Troponin elevation  History of remote MI, TTE 1/2018 showed EF 35-40%, moderate AS, ild aortic root dilatation with Nuclear Stress which showed inferior wall ischemia, reversible.[MP1.1]      ECG showed some ST depressions in the lateral leads and PVCs. Repeat echo showed EF 35-40% with inferior and inferolateral wall hypokinesis consistent with previous nuclear study and echo. Wall motion abnormalities are similar to echo 12/2017.     For these reasons we will hold off on further invasive therapies and attribute troponin elevation to demand ischemia. Later angiogram is reasonable if the patient is symptomatic, however he says he remains asymptomatic without any chest pain or SOB w exertion.[MP1.3]   -continue ASA  -continue Plavix  -continue statin  -continue toprol, lisinopril pending improvement in BP  -echocardiogram to evaluate new RMWA or new valvular abnormalities in setting of sepsis      2) Sepsis  -[MP1.1]cpntinue current management[MP1.3]  - likely contributing to demand ischemia picture    3)   Anemia    -monitor in setting  of demand ischemia  -currently 10.7, and this is appropriate.       Lucie Soto MD    Code Status    Full Code    Reason for Consult   Reason for consult: I was asked to evaluate this patient for troponin elevation     Primary Care Physician   Matt Morrissey    Chief Complaint   Trop elevation    History is obtained from the patient    History of Present Illness  Dustin Pearce is a markedly pleasant 89 year old gentleman who presents with dizziness. He says this started tonight after dinner; he and his wife were out at a nearby restaurant and finished the meal, and he got up to leave and got about 10 steps when he developed sudden onset dizziness, and nearly fell down; his wife says she had to support him. She noticed his right leg dragging on the ground. He sat down and the dizziness resolved, then got up again but it recurred and he almost fell again, and this repeated itself several times; at one point, his wife adds that he actually lost consciousness for about 10-15 seconds. Eventually staff at the restaurant brought him a wheelchair and he used this to get into his car, where he developed an episode of nausea and vomiting; his wife brought him into the ED. While here he has vomited multiple more times; it is non-bloody, non-bilious and has now become associated with diffuse chest tightness radiating across his chest (not his back or belly; he denies abdominal pain or diarrhea, or recent melena or hematochezia). He denies recent fever, chills, sweats, or cough, dyspnea, runny nose, sore throat, or dysuria. His wife lays out all his medications and denies any recent changes. She adds that he has been hospitalized a few times for similar symptoms, most recently over the winter when he was found to have a stroke, and then again 2/2018 when he had a UTI. However he looks much sicker to her tonight than he did then. Overall, he denies any dysarthria or other speech changes, or focal weakness of arm or leg, or  loss of sensation or paresthesias, or any other acute complaints.    Denies any recent other chest pain shortness of breath or cardiovascular symptoms similar to previous stents.   Past Medical History   I have reviewed this patient's medical history and updated it with pertinent information if needed.[MP1.1]   Past Medical History:   Diagnosis Date     Aortic root dilatation (H)      Aortic stenosis      Benign essential hypertension 1/6/2017     Benign non-nodular prostatic hyperplasia with lower urinary tract symptoms 10/20/2016     CAD (coronary artery disease)     MI 1970     Cardiomyopathy (H)      Carotid artery stenosis 10/20/2016    chronically occluded left internal carotid artery     Carotid occlusion, left      Coronary artery disease involving native coronary artery of native heart without angina pectoris 10/20/2016     Diabetes mellitus (H)      DJD (degenerative joint disease)      Gastro-oesophageal reflux disease      Gastroesophageal reflux disease without esophagitis 10/20/2016     Heart attack      Hyperlipidemia      Hypertension      Mild cognitive impairment 10/20/2016     Nephrolithiasis     RECURRENT     Osteopenia      PAD (peripheral artery disease) (H)     peripheral angiogram 5/2017: The common iliac artery stenoses were stented and the superficial femoral artery stenoses were angioplastied     Paroxysmal atrial fibrillation (H)      PONV (postoperative nausea and vomiting)      RBBB with left anterior fascicular block      Unspecified cerebral artery occlusion with cerebral infarction[MP1.4]        Past Surgical History   I have reviewed this patient's surgical history and updated it with pertinent information if needed.[MP1.1]  Past Surgical History:   Procedure Laterality Date     AMPUTATE FOOT Left 6/4/2017    Procedure: AMPUTATE FOOT;  Sun Add#3: partial left foot amputation - Sagital Saw - Mini C-Arm;  Surgeon: Moe Kirk DPM;  Location: [facility] OR     CHOLECYSTECTOMY        ENDARTERECTOMY CAROTID Right 1/3/2018    Procedure: ENDARTERECTOMY CAROTID;  RIGHT CAROTID ENDARTERECTOMY WITH EEG;  Surgeon: Roland Rodriguez MD;  Location:  OR     ESOPHAGOSCOPY, GASTROSCOPY, DUODENOSCOPY (EGD), COMBINED N/A 12/26/2016    Procedure: COMBINED ESOPHAGOSCOPY, GASTROSCOPY, DUODENOSCOPY (EGD);  Surgeon: Nasim Louis MD;  Location:  GI     GENITOURINARY SURGERY      KIDNEY STONE REMOVAL X 4     HC UGI ENDOSCOPY W EUS N/A 12/29/2016    Procedure: COMBINED ENDOSCOPIC ULTRASOUND, ESOPHAGOSCOPY, GASTROSCOPY, DUODENOSCOPY (EGD);  Surgeon: Nasim Louis MD;  Location:  GI     HERNIA REPAIR       INCISION AND DRAINAGE FOOT, COMBINED Left 6/6/2017    Procedure: COMBINED INCISION AND DRAINAGE FOOT;  REVISIONAL LEFT FOOT INCISION AND DRAINAGE WITH POSSIBLE FLAP CLOSURE , ANTIBIOTIC SOLUTION ;  Surgeon: Nabil nAn DPM;  Location:  OR     LASER HOLMIUM LITHOTRIPSY URETER(S), INSERT STENT, COMBINED  3/2/2012    Procedure:COMBINED CYSTOSCOPY, URETEROSCOPY, LASER HOLMIUM LITHOTRIPSY URETER(S), INSERT STENT; CYSTOSCOPY, RIGHT RETROGRADES, RIGHT URETEROSCOPY, HOLMIUM LASER, RIGHT STENT PLACEMENT; Surgeon:ANJELICA LEÓN; Location: OR     ROTATOR CUFF REPAIR RT/LT[MP1.4]         Prior to Admission Medications   Prior to Admission Medications   Prescriptions Last Dose Informant Patient Reported? Taking?   AMITRIPTYLINE HCL PO  Spouse/Significant Other Yes No   Sig: Take 25 mg by mouth nightly as needed for sleep   ATORVASTATIN CALCIUM PO  Spouse/Significant Other Yes No   Sig: Take 40 mg by mouth At Bedtime    Cholecalciferol 61863 UNITS TABS   Yes No   Sig: Take 1 tablet by mouth daily every Sun for deficiancy   Clopidogrel Bisulfate, [id number], (PLAVIX PO)  Spouse/Significant Other Yes No   Sig: Take 75 mg by mouth daily    DULoxetine (CYMBALTA) 30 MG EC capsule  Spouse/Significant Other No No   Sig: Take 1 capsule (30 mg) by mouth daily   LISINOPRIL PO   "Spouse/Significant Other Yes No   Sig: Take 2.5 mg by mouth daily    METFORMIN HCL PO  Spouse/Significant Other Yes No   Sig: Take 1,000 mg by mouth 2 times daily (with meals)   OMEPRAZOLE PO  Spouse/Significant Other Yes No   Sig: Take 40 mg by mouth 2 times daily    TRUE METRIX BLOOD GLUCOSE TEST test strip   No No   Sig: USE TO TEST BLOOD SUGAR THREE TIMES DAILY OR AS DIRECTED   amoxicillin (AMOXIL) 875 MG tablet   No No   Sig: Take 1 tablet (875 mg) by mouth 2 times daily   Patient not taking: Reported on 4/25/2018   aspirin EC 81 MG EC tablet  Spouse/Significant Other No No   Sig: Take 1 tablet (81 mg) by mouth daily   bisacodyl (DULCOLAX) 10 MG Suppository   Yes No   Sig: Place 10 mg rectally daily as needed for constipation   glipiZIDE (GLUCOTROL) 10 MG tablet  Spouse/Significant Other Yes No   Sig: Take 1 tablet (10 mg) by mouth 2 times daily (before meals)   ipratropium (ATROVENT) 0.03 % spray  Spouse/Significant Other Yes No   Sig: Spray 2 sprays into both nostrils 2 times daily as needed    magnesium oxide (MAG-OX) 400 (241.3 Mg) MG tablet   No No   Sig: TAKE 1 TABLET BY MOUTH TWICE DAILY   metoprolol (TOPROL-XL) 25 MG 24 hr tablet  Spouse/Significant Other No No   Sig: Take 0.5 tablets (12.5 mg) by mouth daily   ondansetron (ZOFRAN) 4 MG tablet   Yes No   Sig: Take 4 mg by mouth 4 times daily as needed for nausea   order for DME  Spouse/Significant Other No No   Sig: Equipment being ordered: True [employer] Blood Glucose meter.   polyethylene glycol (MIRALAX/GLYCOLAX) Packet  Spouse/Significant Other Yes No   Sig: Take 17 g by mouth daily as needed for constipation   tamsulosin (FLOMAX) 0.4 MG capsule  Spouse/Significant Other No No   Sig: Take 1 capsule (0.4 mg) by mouth daily      Facility-Administered Medications: None     Allergies   Allergies   Allergen Reactions     Oxycodone Other (See Comments)     \"TERRIBLE SWEATING\"     Sulfa Drugs      Tetracycline        Social History   I have reviewed this " patient's social history and updated it with pertinent information if needed. Dustin Pearce[MP1.1]  reports that he quit smoking about 19 years ago. His smoking use included Cigarettes. He has a 30.00 pack-year smoking history. He has never used smokeless tobacco. He reports that he drinks about 4.2 oz of alcohol per week  He reports that he does not use illicit drugs.[MP1.4]    Family History   I have reviewed this patient's family history and updated it with pertinent information if needed.[MP1.1]   Family History   Problem Relation Age of Onset     Breast Cancer Mother      Heart Failure Father 81[MP1.4]       Review of Systems   The 10 point Review of Systems is negative other than noted in the HPI or here.     Physical Exam   Temp: 97.8  F (36.6  C) Temp src: Oral BP: 128/54 Pulse: 72 Heart Rate: 67 Resp: 16 SpO2: 95 % O2 Device: None (Room air) Oxygen Delivery: 2 LPM  Vital Signs with Ranges  Temp:  [97.8  F (36.6  C)-98.7  F (37.1  C)] 97.8  F (36.6  C)  Pulse:  [72] 72  Heart Rate:  [] 67  Resp:  [8-25] 16  BP: ()/(47-96) 128/54  SpO2:  [94 %-100 %] 95 %  166 lbs 0 oz    Constitutional: Awake, alert, cooperative, no apparent distress, and appears stated age.  Eyes: Lids and lashes normal,   ENT: Normocephalic, without obvious abnormality,   Respiratory:[MP1.1]no wheezing clear[MP1.3]  Cardiovascular: Normal apical impulse, regular rate and rhythm, normal S1 and S2, no S3 or S4, and no murmur noted.  GI: No scars, normal bowel sounds, soft, non-distended,   Skin: No bruising or bleeding, normal skin color,   Musculoskeletal: There is no redness, warmth, or swelling of the joints.    Neurologic: Awake, alert, oriented to name, place and time  Neuropsychiatric: Calm, normal eye contact, alert, normal affect, oriented to self, place, time and situation, memory for past and recent events intact and thought process normal.    Data[MP1.1]   Results for orders placed or performed during the hospital  encounter of 05/26/18 (from the past 24 hour(s))   CBC with platelets differential   Result Value Ref Range    WBC 9.5 4.0 - 11.0 10e9/L    RBC Count 4.14 (L) 4.4 - 5.9 10e12/L    Hemoglobin 12.0 (L) 13.3 - 17.7 g/dL    Hematocrit 38.2 (L) 40.0 - 53.0 %    MCV 92 78 - 100 fl    MCH 29.0 26.5 - 33.0 pg    MCHC 31.4 (L) 31.5 - 36.5 g/dL    RDW 14.2 10.0 - 15.0 %    Platelet Count 189 150 - 450 10e9/L    Diff Method Automated Method     % Neutrophils 45.4 %    % Lymphocytes 44.4 %    % Monocytes 7.9 %    % Eosinophils 1.7 %    % Basophils 0.3 %    % Immature Granulocytes 0.3 %    Nucleated RBCs 0 0 /100    Absolute Neutrophil 4.3 1.6 - 8.3 10e9/L    Absolute Lymphocytes 4.2 0.8 - 5.3 10e9/L    Absolute Monocytes 0.8 0.0 - 1.3 10e9/L    Absolute Eosinophils 0.2 0.0 - 0.7 10e9/L    Absolute Basophils 0.0 0.0 - 0.2 10e9/L    Abs Immature Granulocytes 0.0 0 - 0.4 10e9/L    Absolute Nucleated RBC 0.0    Comprehensive metabolic panel   Result Value Ref Range    Sodium 141 133 - 144 mmol/L    Potassium 4.4 3.4 - 5.3 mmol/L    Chloride 106 94 - 109 mmol/L    Carbon Dioxide 23 20 - 32 mmol/L    Anion Gap 12 3 - 14 mmol/L    Glucose 183 (H) 70 - 99 mg/dL    Urea Nitrogen 24 7 - 30 mg/dL    Creatinine 1.06 0.66 - 1.25 mg/dL    GFR Estimate 66 >60 mL/min/1.7m2    GFR Estimate If Black 79 >60 mL/min/1.7m2    Calcium 8.8 8.5 - 10.1 mg/dL    Bilirubin Total 0.3 0.2 - 1.3 mg/dL    Albumin 3.5 3.4 - 5.0 g/dL    Protein Total 7.0 6.8 - 8.8 g/dL    Alkaline Phosphatase 69 40 - 150 U/L    ALT 18 0 - 70 U/L    AST 20 0 - 45 U/L   Troponin I   Result Value Ref Range    Troponin I ES 0.020 0.000 - 0.045 ug/L   D dimer quantitative   Result Value Ref Range    D Dimer 0.7 (H) 0.0 - 0.50 ug/ml FEU   EKG 12 lead   Result Value Ref Range    Interpretation ECG Click View Image link to view waveform and result    Lactic acid whole blood   Result Value Ref Range    Lactic Acid 4.8 (HH) 0.7 - 2.0 mmol/L   CT Head w/o Contrast    Narrative    CT OF  THE HEAD WITHOUT CONTRAST 5/26/2018 9:24 PM     COMPARISON: Brain MR 1/1/2018.    HISTORY: Dizziness tonight, on thinners, syncope.      TECHNIQUE: 5 mm thick axial CT images of the head were acquired  without IV contrast material.    FINDINGS: There is moderate diffuse cerebral volume loss. There are  subtle patchy areas of decreased density in the cerebral white matter  bilaterally that are consistent with sequela of chronic small vessel  ischemic disease. A moderate sized left frontal ischemic infarct is  again noted without change.    The ventricles and basal cisterns are within normal limits in  configuration given the degree of cerebral volume loss.  There is no  midline shift. There are no extra-axial fluid collections.    No intracranial hemorrhage, mass or recent infarct.    The visualized paranasal sinuses are well-aerated. There is no  mastoiditis. There are no fractures of the visualized bones.      Impression    IMPRESSION: Diffuse cerebral volume loss and cerebral white matter  changes consistent with chronic small vessel ischemic disease. No  evidence for acute intracranial pathology.    Radiation dose for this scan was reduced using automated exposure  control, adjustment of the mA and/or kV according to patient size, or  iterative reconstruction technique.    LISANDRO MAGALLON MD   Blood culture   Result Value Ref Range    Specimen Description Blood Left Arm     Special Requests Aerobic and anaerobic bottles received     Culture Micro No growth after 7 hours    Blood culture   Result Value Ref Range    Specimen Description Blood Right Arm     Special Requests Aerobic and anaerobic bottles received     Culture Micro No growth after 7 hours    UA reflex to Microscopic and Culture   Result Value Ref Range    Color Urine Yellow     Appearance Urine Clear     Glucose Urine 70 (A) NEG^Negative mg/dL    Bilirubin Urine Negative NEG^Negative    Ketones Urine 5 (A) NEG^Negative mg/dL    Specific Gravity Urine  1.016 1.003 - 1.035    Blood Urine Negative NEG^Negative    pH Urine 5.5 5.0 - 7.0 pH    Protein Albumin Urine 30 (A) NEG^Negative mg/dL    Urobilinogen mg/dL Normal 0.0 - 2.0 mg/dL    Nitrite Urine Negative NEG^Negative    Leukocyte Esterase Urine Small (A) NEG^Negative    Source Catheterized Urine     RBC Urine 5 (H) 0 - 2 /HPF    WBC Urine 3 0 - 5 /HPF    Squamous Epithelial /HPF Urine 1 0 - 1 /HPF    Mucous Urine Present (A) NEG^Negative /LPF    Hyaline Casts 3 (H) 0 - 2 /LPF   Urine Culture Aerobic Bacterial   Result Value Ref Range    Specimen Description Catheterized Urine     Special Requests Specimen received in preservative     Culture Micro PENDING    Chest XR,  PA & LAT    Narrative    CHEST TWO VIEWS    5/26/2018 10:52 PM     COMPARISON: Frontal chest x-ray 8/19/2009.    HISTORY: Hypoxia.    FINDINGS: The cardiac silhouette, pulmonary vasculature, lungs and  pleural spaces are within normal limits.      Impression    IMPRESSION: Clear lungs.    LISANDRO MAGALLON MD   Lactic acid   Result Value Ref Range    Lactic Acid 3.5 (H) 0.7 - 2.0 mmol/L   Procalcitonin   Result Value Ref Range    Procalcitonin <0.05 ng/ml   Chest CT, IV contrast only - PE protocol    Narrative    CT CHEST WITH CONTRAST   5/26/2018 11:50 PM     HISTORY: Hypoxia. Dizziness.    COMPARISON: 2/8/2018 - CT abdomen and pelvis.    TECHNIQUE: Following the uneventful administration of 64 mL Isovue-370  intravenous contrast, helical sections were acquired through the lungs  according to the pulmonary embolism protocol. Coronal reconstructions  were generated. Radiation dose for this scan was reduced using  automated exposure control, adjustment of the mA and/or kV according  to the patient's size, or iterative reconstruction technique.    FINDINGS: No visualized pulmonary embolus. The thoracic aorta is  normal in caliber without dissection. Atherosclerotic calcification in  the thoracic aorta and coronary arteries.    Minimal emphysematous  changes in the lungs. A band-like opacity in the  lateral aspect of the mid left lung likely represents atelectasis or  scarring. The lungs are otherwise clear. No pleural or pericardial  effusion. No enlarged lymph nodes in the chest. Small hiatal hernia.    Scan through the upper abdomen is significant for a few nonobstructing  calculi in both kidneys, largest a 0.6 cm right renal calculus, and a  4.6 cm cyst containing a thin internal septation in the upper pole of  the left kidney.      Impression    IMPRESSION:   1. No visualized pulmonary embolus.  2. No convincing evidence of active pulmonary disease.    MEME PFEIFFER MD   Troponin POCT   Result Value Ref Range    Troponin I 0.00 0.00 - 0.10 ug/L   CT Abdomen Pelvis w/o Contrast    Narrative    CT ABDOMEN AND PELVIS WITHOUT CONTRAST   5/27/2018 2:14 AM     HISTORY: Nausea and vomiting.    COMPARISON: 2/8/2018.    TECHNIQUE: Without intravenous contrast, helical sections were  acquired from the top of the diaphragm through the pubic symphysis.  Coronal reconstructions were generated. Radiation dose for this scan  was reduced using automated exposure control, adjustment of the mA  and/or kV according to the patient's size, or iterative reconstruction  technique.    FINDINGS:     Abdomen: The liver, spleen, pancreas and adrenal glands are  unremarkable. 4.4 cm cyst containing a thin internal septation in the  upper pole of the left kidney. Excreted contrast material from a  recent CT scan present within the renal collecting systems and  ureters. The gallbladder is not visualized. Small hiatal hernia. No  enlarged lymph nodes or free fluid in the upper abdomen.  Atherosclerotic calcification in the abdominal aorta.    Scan through the lower chest is unremarkable.    Pelvis: The small and large bowel are normal in caliber. Several  colonic diverticula are present without evidence of diverticulitis.  The appendix is unremarkable. No bowel wall thickening,  pneumatosis or  free intraperitoneal gas. 1 cm diverticulum from the posterior right  aspect of the urinary bladder. No enlarged lymph nodes or free fluid  in the pelvis. Bilateral L5 pars interarticularis defects.      Impression    IMPRESSION:   1. No cause of acute pain identified in the abdomen or pelvis.  2. Colonic diverticulosis without evidence of diverticulitis.    MEME PFEIFFER MD   Glucose by meter   Result Value Ref Range    Glucose 278 (H) 70 - 99 mg/dL   Troponin I   Result Value Ref Range    Troponin I ES 0.119 (H) 0.000 - 0.045 ug/L   Comprehensive metabolic panel   Result Value Ref Range    Sodium 142 133 - 144 mmol/L    Potassium 4.8 3.4 - 5.3 mmol/L    Chloride 112 (H) 94 - 109 mmol/L    Carbon Dioxide 25 20 - 32 mmol/L    Anion Gap 5 3 - 14 mmol/L    Glucose 195 (H) 70 - 99 mg/dL    Urea Nitrogen 20 7 - 30 mg/dL    Creatinine 0.84 0.66 - 1.25 mg/dL    GFR Estimate 85 >60 mL/min/1.7m2    GFR Estimate If Black >90 >60 mL/min/1.7m2    Calcium 8.0 (L) 8.5 - 10.1 mg/dL    Bilirubin Total 0.3 0.2 - 1.3 mg/dL    Albumin 2.9 (L) 3.4 - 5.0 g/dL    Protein Total 5.8 (L) 6.8 - 8.8 g/dL    Alkaline Phosphatase 60 40 - 150 U/L    ALT 15 0 - 70 U/L    AST 18 0 - 45 U/L   CBC with platelets differential   Result Value Ref Range    WBC 7.6 4.0 - 11.0 10e9/L    RBC Count 3.65 (L) 4.4 - 5.9 10e12/L    Hemoglobin 10.7 (L) 13.3 - 17.7 g/dL    Hematocrit 33.5 (L) 40.0 - 53.0 %    MCV 92 78 - 100 fl    MCH 29.3 26.5 - 33.0 pg    MCHC 31.9 31.5 - 36.5 g/dL    RDW 14.3 10.0 - 15.0 %    Platelet Count 144 (L) 150 - 450 10e9/L    Diff Method Automated Method     % Neutrophils 54.9 %    % Lymphocytes 33.3 %    % Monocytes 10.5 %    % Eosinophils 0.9 %    % Basophils 0.1 %    % Immature Granulocytes 0.3 %    Nucleated RBCs 0 0 /100    Absolute Neutrophil 4.2 1.6 - 8.3 10e9/L    Absolute Lymphocytes 2.5 0.8 - 5.3 10e9/L    Absolute Monocytes 0.8 0.0 - 1.3 10e9/L    Absolute Eosinophils 0.1 0.0 - 0.7 10e9/L    Absolute  Basophils 0.0 0.0 - 0.2 10e9/L    Abs Immature Granulocytes 0.0 0 - 0.4 10e9/L    Absolute Nucleated RBC 0.0    Lactic acid whole blood   Result Value Ref Range    Lactic Acid 2.1 (H) 0.7 - 2.0 mmol/L   Hemoglobin A1c   Result Value Ref Range    Hemoglobin A1C 9.1 (H) 0 - 5.6 %   Troponin I   Result Value Ref Range    Troponin I ES 0.898 (HH) 0.000 - 0.045 ug/L   Glucose by meter   Result Value Ref Range    Glucose 180 (H) 70 - 99 mg/dL   Troponin I   Result Value Ref Range    Troponin I ES 2.083 (HH) 0.000 - 0.045 ug/L[MP1.4]       Discussed w Dr Rodriguez[MP1.3]         Revision History        User Key Date/Time User Provider Type Action    > [N/A] 5/27/2018  9:16 PM Lucie Soto MD Physician Addend     MP1.2 5/27/2018  5:34 PM Lucie Soto MD Physician Addend     MP1.3 5/27/2018  5:30 PM Lucie Soto MD Physician Sign     MP1.4 5/27/2018 12:53 PM Lucie Soto MD Physician      MP1.1 5/27/2018 12:47 PM Lucie Soto MD Physician                      Progress Notes - Physician (Notes from 05/31/18 through 06/03/18)      Progress Notes by Lucie Soto MD at 6/3/2018  7:32 AM     Author:  Lucie Soto MD Service:  Cardiology Author Type:  Physician    Filed:  6/3/2018  7:35 AM Date of Service:  6/3/2018  7:32 AM Creation Time:  6/3/2018  7:32 AM    Status:  Signed :  Lucie Soto MD (Physician)         [facility]  Cardiology Progress Note    Outpatient cardiologist: Dr. Samuel.    Date of Service (when I saw the patient):[MP1.1] 06/03/2018[MP1.2]    Summary: Dustin Pearce is a 89 year old male with history of chronic systolic CHF, moderate AS, DM, PA s/p LE angioplasty/stenting, paroxysmal atrial fibrillation, HTN, dyslipidemia who was admitted on 5/26/2018 with dizziness, syncope, n/v, and chest discomfort after vomiting. CT was negative for PE. Cardiology was consulted due to troponin elevated. Initially he was managed conservatively due to lack of recurrent symptoms and the  presumption that his underlying illness caused a small, demand infarct. He had recurrent chest discomfort on 5/28 associated with recurrent anterolateral ST depression and dynamic inferior T wave changes. Troponin increased from 0.5 to 0.7. Cardiology was therefore asked to reevaluation.[MP1.1]      Interval History[MP1.2]   Continues to do well ,continues to have constant 2/10 chest pain, substernal/epigastric, atypical, not associated with exertion. Would like to go home. Doing well with walks.[MP1.1]      Assessment & Plan[MP1.2]        Readmitted with chest pain. Unclear etiology. With continued pain overnight would recommnend monitoring for another 24 hours. Additionally ECG shows ST depressions and T wave inversions in the inferolateral region that is different from ECG yestrday. Will continue to increased Imdur as tolerated. Plan per Dr. Emerson was to discharge given likely noncardiac pain. Given ECG will monitor for another 24 hours.         1.  CAD. Coronary angiogram done due to recurrent chest discomfort associated with EKG changes with ST delevation.   -  Angiogram showed severe disease in the proximal LAD and LCx. The RCA is occluded with large left to right collaterals.   -  S/p stenting of the LAD and Lcx with good results. He has mild to moderate diffuse disease which remains.   -  Continue aspirin and plavix, statin, and Toprolol. Imdur started 5/29 and Toprolol dose increased.  Lisinopril resumed 5/30.  2.  Ischemic cardiomyopathy. LVEF has historically been around 35 - 40%. Repeat echocardiogram shows stable LVEF with moderate to severe inferolateral wall hypokinesis and moderate inferior wall hypokinesis.   -  Continue beta blocker, lisinopril. Has not require diuretic therapy.   2.  SIRS syndrome. No clear source of infection identified.   3.  Anemia. Continue to monitor.   4.  PAD.  5.  Chronic systolic congestive heart failure.   6.  Hypertension.   7.  History of CVA.  8.  Mild to moderate  aortic stenosis.             Plan:  1.  Continue aspirin and plavix for dual antiplatelet therapy.    2.  On appropriate medications including metoprolol and lisinopril. Can titrate these up as needed for BP control.   3.  He has a podiatry appointment next Friday June 8th and wife would like to follow up that day to make it easier on their schedule - scheduled appointment with ASPEN Whitten on 6/8/18 at 12:30 pm. Also scheduled an appointment with Dr. Samuel on 8/31/18 at 10:45 pm.   4.  Plan for hopefully home later today   5. Increase Imdur as needed[MP1.1]        Lucie Soto[MP1.2], MD[MP1.1]    Physical Exam   Temp: 98.4  F (36.9  C) Temp src: Oral BP: 113/67 Pulse: 60 Heart Rate: 63 Resp: 18 SpO2: 95 % O2 Device: None (Room air) Oxygen Delivery: 3 LPM  Vitals:    06/01/18 0802 06/01/18 1400 06/02/18 0600 06/03/18 0204   Weight: 72.6 kg (160 lb) 72.7 kg (160 lb 3.2 oz) 71.6 kg (157 lb 14.4 oz) 71.8 kg (158 lb 6.4 oz)[MP1.2]     Vital Signs with Ranges[MP1.1]  Temp:  [97.9  F (36.6  C)-98.4  F (36.9  C)] 98.4  F (36.9  C)  Pulse:  [60-61] 60  Heart Rate:  [51-69] 63  Resp:  [18] 18  BP: ()/(41-67) 113/67  SpO2:  [92 %-99 %] 95 %  05/29 0700 - 06/03 0659  In: 620 [P.O.:620]  Out: 1575 [Urine:1575]  Net: -955[MP1.2]    Constitutional: Sleeping.  Heart: RRR  Lungs: clear  Abd: soft  Extremities: no edema[MP1.1]    Data   Results for orders placed or performed during the hospital encounter of 06/01/18 (from the past 24 hour(s))   Glucose by meter   Result Value Ref Range    Glucose 173 (H) 70 - 99 mg/dL   EKG 12-lead, tracing only   Result Value Ref Range    Interpretation ECG Click View Image link to view waveform and result    Glucose by meter   Result Value Ref Range    Glucose 298 (H) 70 - 99 mg/dL   Glucose by meter   Result Value Ref Range    Glucose 170 (H) 70 - 99 mg/dL   Glucose by meter   Result Value Ref Range    Glucose 332 (H) 70 - 99 mg/dL   Glucose by meter   Result Value Ref Range    Glucose  176 (H) 70 - 99 mg/dL   Basic metabolic panel   Result Value Ref Range    Sodium 139 133 - 144 mmol/L    Potassium 3.8 3.4 - 5.3 mmol/L    Chloride 105 94 - 109 mmol/L    Carbon Dioxide 27 20 - 32 mmol/L    Anion Gap 7 3 - 14 mmol/L    Glucose 145 (H) 70 - 99 mg/dL    Urea Nitrogen 13 7 - 30 mg/dL    Creatinine 0.82 0.66 - 1.25 mg/dL    GFR Estimate 88 >60 mL/min/1.7m2    GFR Estimate If Black >90 >60 mL/min/1.7m2    Calcium 8.4 (L) 8.5 - 10.1 mg/dL   Iron and iron binding capacity   Result Value Ref Range    Iron 27 (L) 35 - 180 ug/dL    Iron Binding Cap 287 240 - 430 ug/dL    Iron Saturation Index 9 (L) 15 - 46 %   Hemoglobin   Result Value Ref Range    Hemoglobin 9.8 (L) 13.3 - 17.7 g/dL   CK total   Result Value Ref Range    CK Total 41 30 - 300 U/L   TSH with free T4 reflex   Result Value Ref Range    TSH 4.45 (H) 0.40 - 4.00 mU/L   T4 free   Result Value Ref Range    T4 Free 0.98 0.76 - 1.46 ng/dL[MP1.2]     Echocardiogram 5/27/18:  Interpretation Summary  The visual ejection fraction is estimated at 35-40%.  There is moderate to severe inferolateral wall hypokinesis.  There is moderate inferior wall hypokinesis.  The right ventricle is normal in size and function.  The left atrium is mildly dilated.  There is mild mitral stenosis.  Mild to moderate valvular aortic stenosis.  Mild aortic root dilatation.     Tele NSR with BBB[MP1.1]    Medications       aspirin  81 mg Oral Daily     atorvastatin (LIPITOR) tablet 40 mg  40 mg Oral At Bedtime     clopidogrel (PLAVIX) tablet 75 mg  75 mg Oral Daily     DULoxetine  30 mg Oral Daily     furosemide  20 mg Oral BID     glipiZIDE  10 mg Oral BID AC     insulin aspart  1-7 Units Subcutaneous TID AC     insulin aspart  1-5 Units Subcutaneous At Bedtime     isosorbide mononitrate  60 mg Oral Daily     lisinopril  5 mg Oral Daily     metoprolol tartrate  50 mg Oral BID     omeprazole (priLOSEC) CR capsule 40 mg  40 mg Oral BID     sodium chloride (PF)  3 mL Intracatheter  Q8H     tamsulosin  0.4 mg Oral Daily[MP1.2]     Discussed w Dr. Hall[MP1.1]     Revision History        User Key Date/Time User Provider Type Action    > MP1.2 6/3/2018  7:35 AM Lucie Soto MD Physician Sign     MP1.1 6/3/2018  7:32 AM Lucie Soto MD Physician             Progress Notes by Jacque Kim OT at 6/2/2018  3:52 PM     Author:  Jacque Kim OT Service:  Acute IP Rehab Author Type:  Occupational Therapist    Filed:  6/2/2018  3:52 PM Date of Service:  6/2/2018  3:52 PM Creation Time:  6/2/2018  3:52 PM    Status:  Attested :  Jacque Kim OT (Occupational Therapist)    Cosigner:  Pat Garcia MD at 6/2/2018  5:01 PM        Attestation signed by Pat Garcia MD at 6/2/2018  5:01 PM        Physician Attestation   I agree with the information in this note.    Pat Garcia                                                                                                             [facility]      OUTPATIENT OCCUPATIONAL THERAPY  EVALUATION  PLAN OF TREATMENT FOR OUTPATIENT REHABILITATION  (COMPLETE FOR INITIAL CLAIMS ONLY)  Patient's Last Name, First Name, M.I.  YOB: 1928  Dustin Pearce  ALEJANDRA                          Provider's Name  [facility] Medical Record No.  [id number]                               Onset Date:  06/01/18   Start of Care Date:   6/2/18     Type:     ___PT   _X_OT   ___SLP Medical Diagnosis:   Chest pain                        OT Diagnosis:  decreased ADLs and functional mobility   Visits from SOC:  1   _________________________________________________________________________________  Plan of Treatment/Functional Goals    Planned Interventions: ADL retraining, cognition, transfer training, home program guidelines, progressive activity/exercise, risk factor education,       Goals: See Occupational Therapy Goals on Care Plan in [facility] electronic health record.    Therapy  Frequency:  (Eval and treatment only)  Predicted Duration of Therapy Intervention:    _________________________________________________________________________________    I CERTIFY THE NEED FOR THESE SERVICES FURNISHED UNDER        THIS PLAN OF TREATMENT AND WHILE UNDER MY CARE     (Physician co-signature of this document indicates review and certification of the therapy plan).                 ,      Referring Physician: Pat Garcia MD            Initial Assessment        See Occupational Therapy evaluation dated   in Epic electronic health record.[JG1.1]                     Revision History        User Key Date/Time User Provider Type Action    > JG1.1 6/2/2018  3:52 PM Jacque Kim OT Occupational Therapist Sign            Progress Notes by Jacque Kim OT at 6/2/2018  3:44 PM     Author:  Jacque Kim OT Service:  Acute IP Rehab Author Type:  Occupational Therapist    Filed:  6/2/2018  3:44 PM Date of Service:  6/2/2018  3:44 PM Creation Time:  6/2/2018  3:44 PM    Status:  Signed :  Jacque Kim OT (Occupational Therapist)          06/02/18 3785   Quick Adds   Type of Visit Initial Occupational Therapy Evaluation   Living Environment   Lives With spouse   Living Arrangements house   Home Accessibility stairs to enter home;stairs within home;grab bars present (bathtub);tub/shower is not walk in   Number of Stairs to Enter Home 2   Number of Stairs Within Home 7   Transportation Available family or friend will provide   Self-Care   Dominant Hand right   Usual Activity Tolerance moderate   Current Activity Tolerance fair   Regular Exercise yes   Activity/Exercise Type walking   Exercise Amount/Frequency daily  (1/4 of a mile daily)   Equipment Currently Used at Home cane, straight;grab bar   Activity/Exercise/Self-Care Comment tub with grab bar and bath bench   Functional Level Prior   Ambulation 1-->assistive equipment   Transferring 1-->assistive  equipment   Toileting 1-->assistive equipment   Bathing 1-->assistive equipment   Dressing 0-->independent   Eating 0-->independent   Communication 0-->understands/communicates without difficulty   Swallowing 0-->swallows foods/liquids without difficulty   Fall history within last six months yes   Number of times patient has fallen within last six months 4   Prior Functional Level Comment Per pt and wife pt has A with most all IADl's med mgmt, pt does not drive and has been falling more.    General Information   Onset of Illness/Injury or Date of Surgery - Date 06/01/18   Referring Physician Pat Garcia MD   Patient/Family Goals Statement open to TCU   Additional Occupational Profile Info/Pertinent History of Current Problem Dustin Pearce is a 89 year old male medical history significant for diabetes, hypertension, dyslipidemia, anxiety, depression, prior transient ischemic attack with MCI, chronic systolic congestive heart failure, severe 3-vessel coronary artery disease/NSTEMI with recent drug-eluding stent to proximal left anterior descending and proximal left circumflex re admitted observation status to the hospital on 6/1/2018 for chest discomfort.    Precautions/Limitations fall precautions   Heart Disease Risk Factors Age;Gender;Medical history;Diabetes;High blood pressure;Dislipidemia   Cognitive Status Examination   Orientation person;place  (aware of correct month and off on current year)   Level of Consciousness alert   Able to Follow Commands WNL/WFL   Personal Safety (Cognitive) moderate impairment   Cognitive Comment Will continue to monitor cognition during ADl's   Visual Perception   Visual Perception Wears glasses  (macular degeneration)   Sensory Examination   Sensory Quick Adds No deficits were identified   Pain Assessment   Patient Currently in Pain Yes, see Vital Sign flowsheet  (3/10 upper abdomen/chest discomfort)   Range of Motion (ROM)   ROM Comment B UE AROM WFL    Strength    Strength Comments B UE strength WFL, overall decreased strength.   Bed Mobility Skill: Rolling/Turning   Level of [place] - Bed Mobility Skill Rolling Turning minimum assist (75% patients effort)   Assistive Device:  Rolling/Turning bed rails   Bed Mobility Skill: Supine to Sit   Level of [place]: Supine/Sit minimum assist (75% patients effort)   Assistive Device: Supine/Sit bedrail   Transfer Skill: Bed to Chair/Chair to Bed   Level of [place]: Bed to Chair minimum assist (75% patients effort)   Assistive Device - Transfer Skill Bed to Chair Chair to Bed Rehab Eval standard walker   Transfer Skill: Sit to Stand   Level of [place]: Sit/Stand contact guard   Assistive Device for Transfer: Sit/Stand standard walker   Transfer Skill: Toilet Transfer   Level of [place]: Toilet contact guard   Assistive Device standard walker;grab bars   Lower Body Dressing   Level of Grant: Dress Lower Body contact guard   Grooming   Level of [place]: Grooming contact guard   Instrumental Activities of Daily Living (IADL)   Previous Responsibilities meal prep  (wife A's with all IADL's, reports does some cooking)   Activities of Daily Living Analysis   Impairments Contributing to Impaired Activities of Daily Living balance impaired;cognition impaired;strength decreased  (decreased activity tolerance)   General Therapy Interventions   Planned Therapy Interventions ADL retraining;cognition;transfer training;home program guidelines;progressive activity/exercise;risk factor education   Clinical Impression   Criteria for Skilled Therapeutic Interventions Met yes, treatment indicated   OT Diagnosis decreased ADLs and functional mobility   Influenced by the following impairments impaired balance, overall decreased strength, activity tolerance and safety   Assessment of Occupational Performance 3-5 Performance Deficits   Identified Performance Deficits impaired I and safety with dressing, toilet and tub  "transfer, cooking.    Clinical Decision Making (Complexity) Moderate complexity   Therapy Frequency (Eval and treatment only)   Anticipated Discharge Disposition Transitional Care Facility   Risks and Benefits of Treatment have been explained. Yes   Patient, Family & other staff in agreement with plan of care Yes   [facility] AM-PAC  \"6 Clicks\" Daily Activity Inpatient Short Form   1. Putting on and taking off regular lower body clothing? 3 - A Little   2. Bathing (including washing, rinsing, drying)? 3 - A Little   3. Toileting, which includes using toilet, bedpan or urinal? 3 - A Little   4. Putting on and taking off regular upper body clothing? 3 - A Little   5. Taking care of personal grooming such as brushing teeth? 3 - A Little   6. Eating meals? 4 - None   Daily Activity Raw Score (Score out of 24.Lower scores equate to lower levels of function) 19   Total Evaluation Time   Total Evaluation Time (Minutes) 10[JG1.1]        Revision History        User Key Date/Time User Provider Type Action    > JG1.1 6/2/2018  3:44 PM Jacque Kim, OT Occupational Therapist Sign            Progress Notes by Pat Garcia MD at 6/2/2018  3:07 PM     Author:  Pat Garcia MD Service:  Hospitalist Author Type:  Physician    Filed:  6/2/2018  3:31 PM Date of Service:  6/2/2018  3:07 PM Creation Time:  6/2/2018  3:07 PM    Status:  Signed :  Pat Garcia MD (Physician)         [facility]  Hospitalist Progress Note   06/02/2018          Assessment and Plan:       Dustin Pearce is a 89 year old male medical history significant for diabetes, hypertension, dyslipidemia, anxiety, depression, prior transient ischemic attack with MCI, chronic systolic congestive heart failure, severe 3-vessel coronary artery disease with recent drug-eluding stent to proximal left anterior descending and proximal left circumflex re admitted to the hospital on 6/1/2018 for chest discomfort. "     Chest discomfort in the setting of severe 3-vessel disease, status post recent stents to left anterior descending and left circumflex.    Admitted from 05/26 to 05/31/2018 with non-ST elevation MI.  cardiac catheterization on 05/30/2018 with severe 3-vessel disease including  of RCA, severe proximal LAD and severe proximal left circumflex disease.   Underwent drug-eluting stents to LAD and left circumflex and discharged on 5/31/2018 in the evening. Immediately after discharge patient started having chest pressure. History contradictory as patient's says the symptoms have been going for few days whereas wife states that the patient started complaining of symptoms only after discharged home.  Does have associated shortness of breath on exertion.     Pro BNP elevated at 10,184.  Troponin elevated at 0.133 > 0.093 > 0.093  Glucose 224.  WBC of 7.5, hemoglobin 10.5, platelet 155.    Chest x-ray  shows no acute infiltrate, effusion or pneumothorax.  Per Radiology read, there is stable subtle opacification of left lateral lung base, likely atelectasis or scarring.    EKG shows normal sinus rhythm with frequent PVCs and bifascicular block.     Patient continues to have chest discomfort, states 3/10 in intensity, pain not resolve since hospitalization. Patient has been a readmission to the hospital in less than 24 hours after discharge. Will hold off discharge tonight and continue to monitor and plan for discharge tomorrow likely to TCU if no acute events.   Cardiology following along, impression noncardiac chest pain recommend to continue medical management. Appreciate recommendations.    Continue PTA aspirin 81 mg daily/Plavix 75 mg daily  Continue PTA 40 mg Lipitor at bedtime.  Continue PTA imdur 30 mg daily  Sublingual nitroglycerin p.r.n.     Ischemic cardiomyopathy.  Dyspnea on exertion.  Patient has shortness of breath on minimal exertion. Pro BNP elevated at 10 184  Echocardiogram 5/27/2018 showed LV EF around  35 - 40% with moderate to severe inferolateral wall hypokinesis and moderate inferior wall hypokinesis. Mild to moderate mitral valve/aortic valve stenosis. Mild aortic root dilation  continue PTA metoprolol 50 mg two times a day and 5 mg lisinopril daily.  Will diuresis with 20 mg oral Lasix BID.  Strict input-output monitoring.  Daily weights.    Prior TIA:   Plavix was added to aspirin in 12/2017.   No focal weakness at this time.  CT imaging of the head 5/26/2018 no acute pathology.      Uncontrolled diabetes mellitus:  A1c is 9.1.  Hold Metformin and Glipizide, plan to resume on discharge  ISS insulin used while hospitalized.  Follow up with PCP for PO med optimization  has had nutrition assessment during last admission, can be followed up as outpatient.      Acute on chronic mild normocytic anemia.  Baseline hemoglobin between 11 to 12.  Presented with a hemoglobin of 10.5.  Will check iron studies, TSH, vitamin B12, folic acid.      Deconditioned from acute illness/chronic debility/age.  Patient is 89 years old with multiple medical problems, living at home with his wife. Minimal assistance from family.  Has been readmitted to the hospital in less than 24 hours after discharge.  Physical therapy/occupational therapy assessment requested for safe discharge planning.  Discussed with patient likely need for discharge to TCU for rehabilitation    Hypertension.   Continue PTA lisinopril/metoprolol.  PRN IV hydralazine ordered.  Diuresis with IV Lasix.     Dyslipidemia.    Continue PTA atorvastatin    Gastroesophageal reflux disease.    Continue PTA Prilosec.     BPH.    Continue PTA Flomax.     Chronic depression.    Continue PTA Cymbalta.     Recent SIRS syndrome, resolved:  On last admission on 5/26/2018 patient's BP 89/50 was improved to 140/71 with IV fluid bolus. He was also hypoxic with the lactic acid of 4.8. Pro calcitonin was negative, blood cultures and urine cultures show no growth. Was treated with  empirical antibiotics during hospitalization.    CT chest 5/26/2000 and PE negative for acute pathology as is CXR. EKG shows NSR with ST segment depressions in V3-4.  CT abdomen and pelvis 5/27/2018: No cause of acute pain identified in the abdomen or pelvis. Colonic diverticulosis without evidence of diverticulitis  no signs or symptoms of infection at this time.    # Pain Assessment:  Current Pain Score 6/2/2018   Patient currently in pain? -   Pain score (0-10) 3   Pain location -   Pain descriptors -   - Dustin is experiencing pain due to heart disease.. Pain management was discussed and the plan was created in a collaborative fashion.  Dustin's response to the current recommendations: mixed response  - Please see the plan for pain management as documented above    Diet cardiac diet  DVT Prophylaxis:  pneumatic compression device  Code Status: Full Code  Disposition: Expected discharge likely tomorrow to TCU if pain improves.    Patient, interdisciplinary team involved in care and agrees with plan.  Total time - Greater than 35 min. More than 50% of time spent in direct patient care, care coordination and formalizing plan of care.     Pat Garcia MD        Interval History:      patient lying in bed, complaining of chest discomfort 3/10 in intensity. Patient states he has this constant chest pressure that has never resolved since his previous admission.  Shortness of breath on minimal ambulation.  No palpitations.  Tolerating oral diet.  Needs assistance with ambulation out of bed to the bathroom.       Physical Exam:        Physical Exam   Temp:  [97.6  F (36.4  C)-98.9  F (37.2  C)] 97.9  F (36.6  C)  Pulse:  [56-95] 60  Heart Rate:  [50-82] 51  Resp:  [15-18] 18  BP: ()/(45-75) 109/50  SpO2:  [92 %-99 %] 92 %    Intake/Output Summary (Last 24 hours) at 06/02/18 1507  Last data filed at 06/02/18 1315   Gross per 24 hour   Intake              570 ml   Output             1025 ml   Net             -455  ml     Admission Weight: 72.6 kg (160 lb)  Current Weight: 71.6 kg (157 lb 14.4 oz)    PHYSICAL EXAM  GENERAL: Patient is in no distress. Alert and oriented to simple commands  HEART: Regular rate and rhythm. S1S2. Systolic murmur right sternal area  LUNGS: bilateral decreased breath sounds, no crackles no wheezing.  ABDOMEN: Soft, no abdominal tenderness, bowel sounds heard   NEURO: moving all extremities  EXTREMITIES: trace pedal edema. 2+ peripheral pulses.  SKIN: Warm, dry. No rash or bruising.         Medications:          aspirin  81 mg Oral Daily     atorvastatin (LIPITOR) tablet 40 mg  40 mg Oral At Bedtime     clopidogrel (PLAVIX) tablet 75 mg  75 mg Oral Daily     DULoxetine  30 mg Oral Daily     glipiZIDE  10 mg Oral BID AC     insulin aspart  1-7 Units Subcutaneous TID AC     insulin aspart  1-5 Units Subcutaneous At Bedtime     isosorbide mononitrate  60 mg Oral Daily     lisinopril  5 mg Oral Daily     metoprolol tartrate  50 mg Oral BID     omeprazole (priLOSEC) CR capsule 40 mg  40 mg Oral BID     sodium chloride (PF)  3 mL Intracatheter Q8H     tamsulosin  0.4 mg Oral Daily     acetaminophen, acetaminophen, alum & mag hydroxide-simethicone, amitriptyline (ELAVIL) tablet 25 mg, glucose **OR** dextrose **OR** glucagon, hydrALAZINE, lidocaine 4%, lidocaine (buffered or not buffered), naloxone, nitroGLYcerin, sodium chloride (PF)         Data:      All new lab and imaging data was reviewed.[SN1.1]              Revision History        User Key Date/Time User Provider Type Action    > SN1.1 6/2/2018  3:31 PM Pat Garcia MD Physician Sign            Progress Notes by Destinee Castaneda RN at 6/2/2018  1:15 PM     Author:  Destinee Castaneda RN Service:  Skilled Nursing Author Type:  Registered Nurse    Filed:  6/2/2018  1:18 PM Date of Service:  6/2/2018  1:15 PM Creation Time:  6/2/2018  1:15 PM    Status:  Signed :  Destinee Castaneda RN (Registered Nurse)         Pt had episode of chest  "discomfort, upper sternal up to throat area aching rating 3/10.  Pt states has had discomfort intermittently throughout night. B/P 135/60, HR 60's.  EKG done, O2 applied at 3L/NC, NTG sl x1 give with eventual relief.  Discussed chest pain episode with Tuyet Garcia and Dr. Soto and further treatment.[SH1.1]     Revision History        User Key Date/Time User Provider Type Action    > SH1.1 6/2/2018  1:18 PM Destinee Castaneda, RN Registered Nurse Sign            Progress Notes by Linda Lawrence at 6/2/2018 10:22 AM     Author:  Linda Lawrence Service:  Spiritual Health Author Type:      Filed:  6/2/2018 10:28 AM Date of Service:  6/2/2018 10:22 AM Creation Time:  6/2/2018 10:22 AM    Status:  Signed :  Linda Lawrence ()         SPIRITUAL HEALTH SERVICES Progress Note  [facility]    Visited pt per request. Pt's significant other, Halina, was at pt's bedside. Pt was just recently hospitalized for an angiogram and came back with chest pain. Pt was not very conversational, but when I asked how he felt about being hospitalized again, he replied, \"I'm tired of being in the hospital!\" Pt stated that this is his 6th hospitalization in the past year and a half or so. [place] in [place]. Pt and pt's SO met at work, an insurance company. Pt has one son from a previous marriage who lives in [place] and also has stepchildren. Pt would appreciate a visit from Father Filipe but the need is not urgent. Pt may discharge sometime this weekend.  has no formal plans to follow but is available upon request or need.      Linda Lawrence  Chaplain Resident  Pager: [phone]  Office: [phone][EW1.1]     Revision History        User Key Date/Time User Provider Type Action    > EW1.1 6/2/2018 10:28 AM Linda Lawrence  Sign            Progress Notes by Lucie Soto MD at 6/2/2018  9:53 AM     Author:  Lucie Soto MD Service:  Cardiology Author Type:  Physician    Filed:  6/2/2018 " 10:10 AM Date of Service:  6/2/2018  9:53 AM Creation Time:  6/2/2018  9:53 AM    Status:  Addendum :  Lucie Soto MD (Physician)         [facility]  Cardiology Progress Note    Outpatient cardiologist: Dr. Samuel.    Date of Service (when I saw the patient): 06/02/2018    Summary: Dustin Pearce is a 89 year old male with history of chronic systolic CHF, moderate AS, DM, PA s/p LE angioplasty/stenting, paroxysmal atrial fibrillation, HTN, dyslipidemia who was admitted on 5/26/2018 with dizziness, syncope, n/v, and chest discomfort after vomiting. CT was negative for PE. Cardiology was consulted due to troponin elevated. Initially he was managed conservatively due to lack of recurrent symptoms and the presumption that his underlying illness caused a small, demand infarct. He had recurrent chest discomfort on 5/28 associated with recurrent anterolateral ST depression and dynamic inferior T wave changes. Troponin increased from 0.5 to 0.7. Cardiology was therefore asked to reevaluation.      Interval History   Continues to do well ,continues to have similar pressure epigastric pain. Troponin trending down.[MP1.1] Continues to have PVCs on ECG[MP1.2]     Assessment & Plan        Readmitted with chest pain. Unclear etiology. With continued pain overnight would recommnend monitoring for another 24 hours. Additionally ECG shows ST depressions and T wave inversions in the inferolateral region that is different from ECG yestrday. Will continue to increased Imdur as tolerated. Plan per Dr. Emerson was to discharge given likely noncardiac pain. Given ECG will monitor for another 24 hours.     1) Increase imdur to 60 mg daily[MP1.1]  2) consider further evaluation of epigastric pain     Agree this likely represents noncardiac pain in setting of continued symptoms despite recent stents vs. Refractory angina which will require medical management.    Will sign off now.[MP1.2]     1.  CAD. Coronary angiogram  done due to recurrent chest discomfort associated with EKG changes with ST delevation.   -  Angiogram showed severe disease in the proximal LAD and LCx. The RCA is occluded with large left to right collaterals.   -  S/p stenting of the LAD and Lcx with good results. He has mild to moderate diffuse disease which remains.   -  Continue aspirin and plavix, statin, and Toprolol. Imdur started 5/29 and Toprolol dose increased.  Lisinopril resumed 5/30.  2.  Ischemic cardiomyopathy. LVEF has historically been around 35 - 40%. Repeat echocardiogram shows stable LVEF with moderate to severe inferolateral wall hypokinesis and moderate inferior wall hypokinesis.   -  Continue beta blocker, lisinopril. Has not require diuretic therapy.   2.  SIRS syndrome. No clear source of infection identified.   3.  Anemia. Continue to monitor.   4.  PAD.  5.  Chronic systolic congestive heart failure.   6.  Hypertension.   7.  History of CVA.  8.  Mild to moderate aortic stenosis.     Plan:  1.  Continue aspirin and plavix for dual antiplatelet therapy.    2.  On appropriate medications including metoprolol and lisinopril. Can titrate these up as needed for BP control.   3.  He has a podiatry appointment next Friday June 8th and wife would like to follow up that day to make it easier on their schedule - scheduled appointment with ASPEN Whitten on 6/8/18 at 12:30 pm. Also scheduled an appointment with Dr. Samuel on 8/31/18 at 10:45 pm.   4.  Plan for hopefully home later today     Lucie Soto MD    Physical Exam   Temp: 98  F (36.7  C) Temp src: Oral BP: 124/64 Pulse: 60 Heart Rate: 50 Resp: 16 SpO2: 99 % O2 Device: Nasal cannula Oxygen Delivery: 3 LPM  Vitals:    06/01/18 0802 06/01/18 1400 06/02/18 0600   Weight: 72.6 kg (160 lb) 72.7 kg (160 lb 3.2 oz) 71.6 kg (157 lb 14.4 oz)     Vital Signs with Ranges  Temp:  [97.6  F (36.4  C)-98.9  F (37.2  C)] 98  F (36.7  C)  Pulse:  [56-95] 60  Heart Rate:  [50-82] 50  Resp:  [12-16] 16  BP:  ()/(45-81) 124/64  SpO2:  [95 %-99 %] 99 %  05/28 0700 - 06/02 0659  In: 350 [P.O.:350]  Out: 975 [Urine:975]  Net: -625    Constitutional: Sleeping.  Heart: RRR  Lungs: clear  Abd: soft  Extremities: no edema    Data   Results for orders placed or performed during the hospital encounter of 06/01/18 (from the past 24 hour(s))   Troponin I (now)   Result Value Ref Range    Troponin I ES 0.133 (HH) 0.000 - 0.045 ug/L   Cardiology IP Consult: Patient to be seen: Routine - within 24 hours; Chest pain; recent stent; Consultant may enter orders: Yes    Narrative    Janis Greene PA-C     6/1/2018  3:58 PM  [facility]    Cardiology Consultation    Date of Admission:  6/1/2018  Assessment & Plan   Dustin Pearce is a 89 year old male who was admitted on   6/1/2018. I was asked to see the patient for recurrent chest pain   in the setting of recent stenting.    Summary:   1.  Chest/epigastric discomfort.   2.  CAD with severe three vessel disease s/p stenting of the LAD   and LCx on 5/30.   -  Continue aspirin, plavix, statin, and imdur.   3.  Ischemic cardiomyopathy  LVEF 35 - 40%.   -  Continue metoprolol, lisinopril. He has no required diuretic   therapy.   4.  Hypertension.   5.  DM type II.   6.  Anemia.    Impression/plan:  Dustin presents with several hours of epigastric discomfort in the   setting of recent intervention and drug eluting stent placement   to his LAD and LCx on 5/30. He tells me this pain is entirely   different from the pain he was experiencing last hospitalization,   although he cannot describe this pain for me. On exam, it seems   to be more localized to the epigastric region although he is not   significantly tender.  His LFTs and lipase were unremarkable.    His troponin levels are trending down from .651 on 5/29 to 0.130.   Repeat levels have been flat. At this point his pain seems   non-cardiac in nature. ? Gastritis. We will continue to follow   his troponin  levels and monitor for any recurrence of his pain.   We will also watch him on telemetry.  If his troponin levels were   to rise or he were to have convincing recurrent pain, we could   consider repeat coronary angiography but would not pursue this at   this point.    He should continue on aspirin, plavix, lisinopril, and   metoprolol. If his BPs remain elevated, we could increase his   lisinopril. If he has no recurrent symptoms and troponins remain   flat/trending, he can likely be discharged this weekend.   Cardiology follow up has already been arranged in the clinic for   next week.     Janis Greene PA-C    Reason for Consult   Reason for consult: I was asked by Dr. Whitaker to evaluate this   patient for chest discomfort.    Primary Care Physician   Matt Morrissey    Chief Complaint   Chest pain    History is obtained from the patient and wife.    History of Present Illness   Dustin Pearce is a 89 year old male with a history of chronic   systolic CHF with a LVEF of 35 - 40%, moderate AS, DM, PA s/p LE   angioplasty/stenting, paroxysmal atrial fibrillation, HTN,   dyslipidemia who was admitted today with chest discomfort. He had   an abnormal stress test in March of 2017 which showed a evidence   of a prior MI in the RCA/circumflex distribution with mild   ezekiel-infarct ischemia. Continued medical management was   recommended. He has followed with Dr. Samuel in clinic and was last   seen in December.     He was recently admitted on 5/26/18 with SIRS syndrome and chest   discomfort. He was hypotensive and hypoxic on admission.   Infectious work up was negative. Troponin levels were elevated up   to 2.3. EKG showed some new ST depression in the lateral leads.   Echocardiogram showed a stable LVEF of 35 - 40% with moderate to   severe inferolateral wall hypokinesis and moderate inferior wall   hypokinesis. Cardiology was consulted. His troponin elevation was   felt to likely represent demand ischemia in the  setting of   sepsis. As he was asymptomatic without chest discomfort, family   wanted to pursue more conservative management and hold off on an   angiogram.    On 5/29, he developed recurrent chest discomfort associated with   recurrent anterolateral ST depression and inferior T wave   changes. His pain resolved with nitroglycerin. Troponin increased   from 0.5 to 0.7. After discussion, he agreed to coronary   angiography.     This was done on 5/30. He had severe disease in the proximal LAD   and LCx with 90% stenosis. The RCA is occluded with large left to   right collaterals. A very small distal OM branch was occluded   with collaterals. He underwent stenting of the LAD and LCx with   good results. Mild to moderate diffuse disease remains.     He did well with cardiac rehab without recurrent symptoms. He did   have a short run of NSVT during cardiac rehab which was   asymptomatic. He was discharged home yesterday, 5/31, on   metoprolol, imdur, lisinopril, aspirin and plavix.     History is obtained from the patient and his wife. He is a fair   historian at best. Last night, he told his wife around 9 pm that   he had developed some chest discomfort. He was able to go to   sleep last night but his pain persisted when he woke up this   morning. When I ask where his pain is he points to his epigastric   region. The pain persisted for several hours this morning but has   now resolved. It did not improve much with nitroglycerin. He   tells me that it is completely different from the pain he was   experiencing prior to his stents although when I ask him to   explain this pain he can give me no details about it.     In addition to his pain he perhaps noted some mild shortness of   breath when walking to the bathroom this morning. He also thinks   he was a little short of breath last night when lying in bed. He   had no SOB with cardiac rehab yesterday. No nausea, vomiting,   PND, or peripheral edema.     Past Medical  History    Past Medical History:   Diagnosis Date     Aortic root dilatation (H)      Aortic stenosis      Benign essential hypertension 1/6/2017     Benign non-nodular prostatic hyperplasia with lower urinary   tract symptoms 10/20/2016     CAD (coronary artery disease)     MI 1970     Cardiomyopathy (H)      Carotid artery stenosis 10/20/2016    chronically occluded left internal carotid artery     Carotid occlusion, left      Coronary artery disease involving native coronary artery of   native heart without angina pectoris 10/20/2016     Diabetes mellitus (H)      DJD (degenerative joint disease)      Gastro-oesophageal reflux disease      Gastroesophageal reflux disease without esophagitis 10/20/2016     Heart attack      Hyperlipidemia      Hypertension      Mild cognitive impairment 10/20/2016     Nephrolithiasis     RECURRENT     Osteopenia      PAD (peripheral artery disease) (H)     peripheral angiogram 5/2017: The common iliac artery stenoses   were stented and the superficial femoral artery stenoses were   angioplastied     Paroxysmal atrial fibrillation (H)      PONV (postoperative nausea and vomiting)      RBBB with left anterior fascicular block      Unspecified cerebral artery occlusion with cerebral infarction          Past Surgical History   Past Surgical History:   Procedure Laterality Date     AMPUTATE FOOT Left 6/4/2017    Procedure: AMPUTATE FOOT;  Sun Add#3: partial left foot   amputation - Sagital Saw - Mini C-Arm;  Surgeon: Moe Kirk DPM;  Location:  OR     CHOLECYSTECTOMY       ENDARTERECTOMY CAROTID Right 1/3/2018    Procedure: ENDARTERECTOMY CAROTID;  RIGHT CAROTID ENDARTERECTOMY   WITH EEG;  Surgeon: Roland Rodriguez MD;  Location:  OR     ESOPHAGOSCOPY, GASTROSCOPY, DUODENOSCOPY (EGD), COMBINED N/A   12/26/2016    Procedure: COMBINED ESOPHAGOSCOPY, GASTROSCOPY, DUODENOSCOPY   (EGD);  Surgeon: Nasim Louis MD;  Location:  GI     GENITOURINARY SURGERY       KIDNEY STONE REMOVAL X 4     HC UGI ENDOSCOPY W EUS N/A 12/29/2016    Procedure: COMBINED ENDOSCOPIC ULTRASOUND, ESOPHAGOSCOPY,   GASTROSCOPY, DUODENOSCOPY (EGD);  Surgeon: Nasim Louis MD;  Location:  GI     HERNIA REPAIR       INCISION AND DRAINAGE FOOT, COMBINED Left 6/6/2017    Procedure: COMBINED INCISION AND DRAINAGE FOOT;  REVISIONAL LEFT   FOOT INCISION AND DRAINAGE WITH POSSIBLE FLAP CLOSURE ,   ANTIBIOTIC SOLUTION ;  Surgeon: Nabil Ann DPM;    Location:  OR     LASER HOLMIUM LITHOTRIPSY URETER(S), INSERT STENT, COMBINED    3/2/2012    Procedure:COMBINED CYSTOSCOPY, URETEROSCOPY, LASER HOLMIUM   LITHOTRIPSY URETER(S), INSERT STENT; CYSTOSCOPY, RIGHT   RETROGRADES, RIGHT URETEROSCOPY, HOLMIUM LASER, RIGHT STENT   PLACEMENT; Surgeon:ANJELICA LEÓN; Location: OR     ROTATOR CUFF REPAIR RT/LT         Prior to Admission Medications   Prior to Admission Medications   Prescriptions Last Dose Informant Patient Reported? Taking?   AMITRIPTYLINE HCL PO Past Month at more than 2 weeks ago   Spouse/Significant Other Yes Yes   Sig: Take 25 mg by mouth nightly as needed for sleep   ATORVASTATIN CALCIUM PO 5/31/2018 at pm Spouse/Significant Other   Yes Yes   Sig: Take 40 mg by mouth At Bedtime    Cholecalciferol 84706 UNITS TABS 5/31/2018 at Unknown time   Spouse/Significant Other Yes Yes   Sig: Take 1 tablet by mouth three times a week (Mon, Wed, Fri)   Clopidogrel Bisulfate, [id number], (PLAVIX PO) 5/31/2018 at   Unknown time Spouse/Significant Other Yes Yes   Sig: Take 75 mg by mouth daily    DULoxetine (CYMBALTA) 30 MG EC capsule 5/31/2018 at Unknown time   Spouse/Significant Other No Yes   Sig: Take 1 capsule (30 mg) by mouth daily   METFORMIN HCL PO 5/31/2018 at pm Spouse/Significant Other Yes Yes     Sig: Take 1,000 mg by mouth 2 times daily (with meals)   OMEPRAZOLE PO 5/31/2018 at pm Spouse/Significant Other Yes Yes   Sig: Take 40 mg by mouth 2 times daily    TRUE METRIX  BLOOD GLUCOSE TEST test strip  Spouse/Significant   Other No No   Sig: USE TO TEST BLOOD SUGAR THREE TIMES DAILY OR AS DIRECTED   aspirin EC 81 MG EC tablet 5/31/2018 at Unknown time   Spouse/Significant Other No Yes   Sig: Take 1 tablet (81 mg) by mouth daily   bisacodyl (DULCOLAX) 10 MG Suppository prn med taken more than a   month ago Spouse/Significant Other Yes Yes   Sig: Place 10 mg rectally daily as needed for constipation   glipiZIDE (GLUCOTROL) 10 MG tablet 5/31/2018 at Unknown time   Spouse/Significant Other Yes Yes   Sig: Take 1 tablet (10 mg) by mouth 2 times daily (before meals)   ipratropium (ATROVENT) 0.03 % spray prn med Spouse/Significant   Other No Yes   Sig: INHALE 1 SPRAY IN EACH NOSTRIL EVERY 12 HOURS AS NEEDED   isosorbide mononitrate (IMDUR) 30 MG 24 hr tablet 5/31/2018 at am   Spouse/Significant Other No Yes   Sig: Take 1 tablet (30 mg) by mouth daily   lisinopril (PRINIVIL/ZESTRIL) 5 MG tablet 5/31/2018 at am   Spouse/Significant Other No Yes   Sig: Take 1 tablet (5 mg) by mouth daily   magnesium oxide (MAG-OX) 400 (241.3 Mg) MG tablet 5/31/2018 at   Unknown time Spouse/Significant Other No Yes   Sig: TAKE 1 TABLET BY MOUTH TWICE DAILY   metoprolol tartrate (LOPRESSOR) 50 MG tablet 5/31/2018 at am   Spouse/Significant Other No Yes   Sig: Take 1 tablet (50 mg) by mouth 2 times daily   nitroGLYcerin (NITROSTAT) 0.4 MG sublingual tablet    Spouse/Significant Other No Yes   Sig: For chest pain place 1 tablet under the tongue every 5   minutes for 3 doses. If symptoms persist 5 minutes after 1st dose   call 911.   order for DME  Spouse/Significant Other No No   Sig: Equipment being ordered: True Matrix Blood Glucose meter.   polyethylene glycol (MIRALAX/GLYCOLAX) Packet 5/31/2018   Spouse/Significant Other Yes Yes   Sig: Take 17 g by mouth daily as needed for constipation   tamsulosin (FLOMAX) 0.4 MG capsule 5/31/2018 at Unknown time   Spouse/Significant Other No Yes   Sig: Take 1 capsule (0.4  "mg) by mouth daily      Facility-Administered Medications: None     Allergies   Allergies   Allergen Reactions     Oxycodone Other (See Comments)     \"TERRIBLE SWEATING\"     Sulfa Drugs      Tetracycline        Social History   . Former smoker, quit in 1999. 1 alcoholic beverage per   week. No drug use.    Family History   Family History   Problem Relation Age of Onset     Breast Cancer Mother      Heart Failure Father 81       Review of Systems   The 10 point Review of Systems is negative other than noted in   the HPI or here.    Physical Exam   Temp: 97.8  F (36.6  C) Temp src: Oral BP: 136/81 Pulse: 75 Heart   Rate: 75 Resp: 16 SpO2: 96 % O2 Device: None (Room air)    Vital Signs with Ranges  Temp:  [97.8  F (36.6  C)-99.1  F (37.3  C)] 97.8  F (36.6  C)  Pulse:  [75] 75  Heart Rate:  [67-76] 75  Resp:  [11-20] 16  BP: (136-177)/(65-83) 136/81  SpO2:  [94 %-96 %] 96 %  160 lbs 3.2 oz    Constitutional: Alert, no acute distress.  Eyes: sclera anicteric  Respiratory: No respiratory distress. Breath sounds are CTAB. No   wheezes, rhonchi, or rales.  Cardiovascular: Regular rate and rhythm. Normal S1, S2. No   murmurs, rubs, or gallops.  GI: abdomen soft.  Skin: warm and dry.   Musculoskeletal: No LE edema bilaterally. Viktoria  Neurologic: alert and oriented x 3.  Psychiatric: affect appropriate.     Data   -Data reviewed today: All pertinent laboratory and imaging   results from this encounter were reviewed. I personally reviewed   the EKG tracing showing NSR.    Recent Labs  Lab 06/01/18  1015 06/01/18  0810 05/31/18  0600 05/30/18  0540 05/29/18  0635  05/27/18  0710  05/27/18  0119   WBC  --  7.5 8.5 9.1 7.5  < > 7.6  --   --    HGB  --  10.5* 10.5* 11.0* 11.1*  < > 10.7*  --   --    MCV  --  91 91 90 92  < > 92  --   --    PLT  --  155 148* 166 165  < > 144*  --   --    NA  --  142 139 138 140  < > 142  --   --    POTASSIUM  --  4.1 4.3 4.2 4.9  < > 4.8  --   --    CHLORIDE  --  106 105 104 106  < > 112*  -- "   --    CO2  --  29 30 25 28  < > 25  --   --    BUN  --  15 10 12 8  < > 20  --   --    CR  --  0.70 0.81 0.68 0.82  < > 0.84  --   --    ANIONGAP  --  7 4 9 6  < > 5  --   --    ALLISON  --  9.1 8.6 9.0 8.8  < > 8.0*  --   --    GLC  --  224* 200* 211* 187*  < > 195*  --   --    ALBUMIN  --  2.9*  --   --   --   --  2.9*  --   --    PROTTOTAL  --  6.6*  --   --   --   --  5.8*  --   --    BILITOTAL  --  0.6  --   --   --   --  0.3  --   --    ALKPHOS  --  67  --   --   --   --  60  --   --    ALT  --  14  --   --   --   --  15  --   --    AST  --  15  --   --   --   --  18  --   --    LIPASE  --  104  --   --   --   --   --   --   --    TROPI 0.133* 0.130*  --   --  0.651*  < > 0.898*  < >  --    TROPONIN  --   --   --   --   --   --   --   --  0.00   < > = values in this interval not displayed.    Telemetry NSR    Imaging:  Recent Results (from the past 24 hour(s))   XR Chest 2 Views    Narrative    XR CHEST 2 VW 6/1/2018 8:22 AM    HISTORY: Pain.    COMPARISON: May 26, 2018.      Impression    IMPRESSION: Stable subtle opacification of the left lateral lung   base,  likely atelectasis or scarring as before. No new focal pulmonary  opacities otherwise. No pleural effusions or pneumothorax. Stable  heart and mediastinum.    GALDINO LANDERS MD      Glucose by meter   Result Value Ref Range    Glucose 201 (H) 70 - 99 mg/dL   Troponin I - Now then in 4 hours x 2    Result Value Ref Range    Troponin I ES 0.093 (H) 0.000 - 0.045 ug/L   Glucose by meter   Result Value Ref Range    Glucose 236 (H) 70 - 99 mg/dL   Troponin I - Now then in 4 hours x 2    Result Value Ref Range    Troponin I ES 0.093 (H) 0.000 - 0.045 ug/L   Glucose by meter   Result Value Ref Range    Glucose 209 (H) 70 - 99 mg/dL   EKG 12-lead, tracing only   Result Value Ref Range    Interpretation ECG Click View Image link to view waveform and result    Glucose by meter   Result Value Ref Range    Glucose 173 (H) 70 - 99 mg/dL   EKG 12-lead, tracing only    Result Value Ref Range    Interpretation ECG Click View Image link to view waveform and result      Echocardiogram 5/27/18:  Interpretation Summary  The visual ejection fraction is estimated at 35-40%.  There is moderate to severe inferolateral wall hypokinesis.  There is moderate inferior wall hypokinesis.  The right ventricle is normal in size and function.  The left atrium is mildly dilated.  There is mild mitral stenosis.  Mild to moderate valvular aortic stenosis.  Mild aortic root dilatation.     Tele NSR with BBB    Medications       aspirin  81 mg Oral Daily     atorvastatin (LIPITOR) tablet 40 mg  40 mg Oral At Bedtime     clopidogrel (PLAVIX) tablet 75 mg  75 mg Oral Daily     DULoxetine  30 mg Oral Daily     glipiZIDE  10 mg Oral BID AC     insulin aspart  1-7 Units Subcutaneous TID AC     insulin aspart  1-5 Units Subcutaneous At Bedtime     isosorbide mononitrate  30 mg Oral Daily     lisinopril  5 mg Oral Daily     metoprolol tartrate  50 mg Oral BID     omeprazole (priLOSEC) CR capsule 40 mg  40 mg Oral BID     sodium chloride (PF)  3 mL Intracatheter Q8H     tamsulosin  0.4 mg Oral Daily[MP1.1]          Revision History        User Key Date/Time User Provider Type Action    > MP1.2 6/2/2018 10:10 AM Lucie Soto MD Physician Addend     MP1.1 6/2/2018 10:05 AM Lucie Soto MD Physician Sign            ED Provider Notes by Chrissy Lezama MD at 6/1/2018  7:48 AM     Author:  Chrissy Lezama MD Service:  Emergency Medicine Author Type:  Physician    Filed:  6/1/2018  3:26 PM Date of Service:  6/1/2018  7:48 AM Creation Time:  6/1/2018  7:49 AM    Status:  Signed :  Chrissy Lezama MD (Physician)           History     Chief Complaint:  Chest pressure and shortness of breath    HPI   Dustin Ramses is a 89 year old male with a[JD1.1] complex medical history[JD1.2] of hypertension, CVA, diabetes type II, atrial fibrillation[JD1.1] on Plavix and ASA[KM1.1],  "CAD[JD1.1] and NSTEMI[JD1.2] status post cardiac stents,[JD1.1] CHF with EF,[JD1.2] carotid artery stenosis status post recent endarterectomy who presents to the ED for evaluation of chest pressure. The patient was just discharged yesterday[JD1.1] 5/31[JD1.2] after[JD1.1] being admitted for NSTEMI 5/26 and[JD1.2] having two stents placed. The patient comes in[JD1.1] this morning through triage[JD1.2] because he started[JD1.1] feeling a \"[JD1.2]pressure[JD1.1]\"[JD1.2] in his chest last night while he was resting. He states it has been intermittent since last night. He mentions some accompanying shortness of breath[JD1.1] and expresses that his[JD1.2] position doesn't seem to impact his symptoms. The chest pressure and shortness of breath feel different[JD1.1] from how he felt[JD1.2] when he presented to the hospital[JD1.1] 5/26 with an NSTEMI[JD1.2]. The patient denies nausea, vomiting[JD1.1], LH, diaphoresis[KM1.1].    Allergies:  Oxycodone  Sulfa Drugs  Tetracycline     Medications:    Amitriptyline[JD1.1]  Plavix[JD1.3]  Aspirin  Atorvastatin  Dulcolax  Cholecalciferol  Clopidogrel Bisulfate  Glucotrol  Lisinopril  Atrovent  Cymbalta  Nitrostat  Mag-Ox  Imdur  Miralax  Omeprazole  Flomax     Past Medical History:    Aortic root dilation[JD1.1]  CHF with EF[JD1.4]  Aortic stenosis  Hypertension  BPH  CAD  Cardiomyopathy  Diabetes type II  DJD  GERD[JD1.1]  NSTEMI[JD1.2]  Hyperlipidemia  Hypertension  Hyperlipidemia  Nephrolithiasis  Osteopenia  Mild cognitive impairment  PONV  Cerebral artery occlusion with CVA  PAD  Atrial fibrillation  RBBB[JD1.1]  Recurrent UTI  Sepsis[JD1.2]    Past Surgical History:[JD1.1]    Amputate foot  Cholecystectomy  Endarterectomy carotid  EGD  Kidney stone removal  Endoscopy  Hernia repair  I&D foot  Rotator cuff repair[JD1.2]    Family History:[JD1.1]    Breast Cancer- mother  Heart failure- father    Social History:  The patient was in the ED alone.  Smoking Status: Former smoker 30 " "yrs  Smokeless Tobacco: Never  Alcohol Use: Yes[JD1.2]  Marital Status:        Review of Systems   Constitutional: Negative for[JD1.1] diaphoresis[JD1.3].   Respiratory: Positive for[JD1.1] shortness of breath[JD1.2].    Cardiovascular: Positive for[JD1.1] chest pain[JD1.2].   Gastrointestinal: Negative for[JD1.1] nausea[JD1.2] and[JD1.1] vomiting[JD1.2].   Neurological: Negative for[JD1.1] light-headedness[JD1.3].[JD1.1]   All other systems reviewed and are negative[JD1.2].    Physical Exam   First Vitals:[JD1.1]  Patient Vitals for the past 24 hrs:   BP Temp Temp src Heart Rate Resp SpO2 Height Weight   06/01/18 0945 162/79 - - 67 11 - - -   06/01/18 0930 141/83 - - 68 20 - - -   06/01/18 0915 160/68 - - 68 18 - - -   06/01/18 0900 156/69 - - - - - - -   06/01/18 0845 155/73 - - - - - - -   06/01/18 0802 177/78 99.1  F (37.3  C) Oral 76 20 95 % 1.651 m (5' 5\") 72.6 kg (160 lb)   06/01/18 0800 177/78 - - 75 18 94 % - -[JD1.5]     Physical Exam[JD1.1]  General/Appearance: appears stated age, well-groomed, appears comfortable, lying down flat in bed without any evidence of discomfort or SOB  Eyes: EOMI, no scleral injection, no icterus  ENT: MMM  Neck: supple, nl ROM, no stiffness  Cardiovascular: RRR, nl S1S2, no m/r/g, 2+ pulses in all 4 extremities, cap refill <2sec  Respiratory: CTAB, good air movement throughout, no wheezes/rhonchi/rales, no increased WOB, no retractions  Back: no lesions  GI: abd soft, non-distended, nttp,  no HSM, no rebound, no guarding, nl BS  MSK: FERNANDEZ, good tone, no bony abnormality  Skin: warm and well-perfused, no rash, no edema, no ecchymosis, nl turgor  Neuro: GCS 15, alert and oriented, no gross focal neuro deficits  Psych: interacts appropriately  Heme: no petechia, no purpura, no active bleeding[KM1.1]    Emergency Department Course[JD1.1]   ECG done at 0753. ECG read at 0754. Indication: Chest pressure, SOB  Rate 71 bpm. MO interval 150. QRS duration 132. QT/QTc 458/497. " P-R-T axes 66 -66 56.  Sinus rhythm with frequent premature ventricular complexes and fusion complexes with escape complexes.  Possible left atrial enlargement.  Right bundle branch block.  Left anterior fascicular block.  ** Bifascicular block **  Abnormal ECG.[JD1.2]    Imaging:[JD1.1]  Radiographic findings were communicated with the patient who voiced understanding of the findings.    XR Chest 2 Views:[JD1.2]  IMPRESSION: Stable subtle opacification of the left lateral lung base,  likely atelectasis or scarring as before. No new focal pulmonary  opacities otherwise. No pleural effusions or pneumothorax. Stable  heart and mediastinum.  Per Radiology.[JD1.6]    Laboratory:[JD1.1]  CBC:[JD1.2] WBC 7.5, HGB 10.5(L), [JD1.7]  BMP:[JD1.2] Glucose 224(H) o/w WNL (Creat 0.70)[JD1.7]  Troponin:[JD1.2] 0.130(HH)  Repeat Troponin:[JD1.7] 0.133(HH)[JD1.8]  BNP:[JD1.2] 22243(H)[JD1.7]    Interventions:[JD1.1]  0804 Nitro sublingual tablet 0.4mg    0805 Aspirin chewable tablet 324mg PO[JD1.2]    Emergency Department Course:  Nursing notes and vitals reviewed.  I performed an exam of the patient as documented above.[JD1.1]     1107 I rechecked the patient.[JD1.9]    1157 I rechecked the patient and offered him admission. He would like 30 minutes to think about whether or not he wants to stay in the hospital.[JD1.10]    1207 Nursing staff reported that the patient decided he would like to be admitted.    12[JD1.11]12[JD1.12] Discussed the patient with [JD1.11] Sherman[JD1.12], who will admit the patient to a[JD1.11]n Obs[JD1.12] bed for further monitoring, evaluation, and treatment.[JD1.11]    1228 I spoke with Dr. Emerson over the phone, who will watch the patient.[JD1.13]    Impression & Plan      Medical Decision Making:  Dustin Pearce is a 89 year old male[JD1.1] who was just admitted for an end STEMI status post 2 stent placement 2 days ago, discharged yesterday, who began having chest pressure or shortness of  breath last night, just hours after discharge.  Although the symptoms are not the same as what brought him in initially for his previous admission I am concerned as he has diffuse coronary artery disease that he may benefit from additional cardiac intervention such as additional cath or additional medication management.  Delta troponins are fairly stable.  They are elevated however trending downwards from the last one drawn from his hospital admission.  I suspect these are still downtrending however I do think, given his significant vascular history, that he would benefit from coming into the hospital for continued troponin management and cardiology involvement.  Family and staff feel comfortable with this plan.[KM1.2]      Diagnosis:[JD1.1]    ICD-10-CM    1. Acute chest pain R07.9[JD1.5]      Disposition:[JD1.1]  The patient was[JD1.2] admitted to the hospital.[JD1.11]    6/1/2018    EMERGENCY DEPARTMENT[JD1.1]    I, Jacque Her, am serving as a scribe at 0755 on June 1, 2018 to document services personally performed by Dr. Lezama, based on my observations and the provider's statements to me.[JD1.2]       Chrissy Lezama MD  06/01/18 1526  [KM1.3]     Revision History        User Key Date/Time User Provider Type Action    > KM1.3 6/1/2018  3:26 PM Chrissy Lezama MD Physician Sign     KM1.2 6/1/2018  3:24 PM Chrissy Lezama MD Physician      JD1.13 6/1/2018 12:29 PM Jacque Her Scribe Share     JD1.12 6/1/2018 12:13 PM Samina Hernifer Scribe Share     JD1.5 6/1/2018 12:09 PM Dewing Jacque Scribe Share     JD1.11 6/1/2018 12:07 PM Samina Hernifer Scribe      JD1.10 6/1/2018 11:58 AM Samina Hernifer Scribe Share     JD1.9 6/1/2018 11:08 AM Samina Hernifer Scribe Share     JD1.8 6/1/2018 11:06 AM Samina Hernifer Scribe Share     [N/A] 6/1/2018  9:56 AM Jacque Her     JD1.3 6/1/2018  9:46 AM Jacque Her     JD1.6 6/1/2018   "9:43 AM Dewing, Jacque Scribe Share     [N/A] 6/1/2018  9:42 AM Dewing, Jacque Scribe Share     [N/A] 6/1/2018  9:39 AM Dewing, Jacque Scribe Share     [N/A] 6/1/2018  9:16 AM Dewing, Jacque Scribe Share     [N/A] 6/1/2018  9:12 AM Dewing, Jacque Scribe Share     [N/A] 6/1/2018  9:06 AM Dewing, Jacque Scribe Share     [N/A] 6/1/2018  9:03 AM Dewing, Jacque Scribe Share     [N/A] 6/1/2018  8:59 AM Dewing, Jacque Scribe Share     JD1.7 6/1/2018  8:56 AM Dewing, Jacque Scribe Share     KM1.1 6/1/2018  8:53 AM Chrissy Lezama MD Physician Share     JD1.4 6/1/2018  8:20 AM Dewing, Jacque Scribe Share     [N/A] 6/1/2018  8:16 AM Dewing, Jacque Scribe Share     JD1.2 6/1/2018  8:04 AM Dewing, Jacque Scribe Share     JD1.1 6/1/2018  8:03 AM Dewing, Jacque Scribe Share            ED Notes by Leanna Lara RN at 6/1/2018 12:21 PM     Author:  Leanna Lara RN Service:  (none) Author Type:  Registered Nurse    Filed:  6/1/2018  1:18 PM Date of Service:  6/1/2018 12:21 PM Creation Time:  6/1/2018 12:26 PM    Status:  Addendum :  Leanna Lara RN (Registered Nurse)         [facility]  ED Nurse Handoff Report    ED Chief complaint: Chest Pain (wednesday had angiogram with stents 8pm last night started having pressure)      ED Diagnosis:   Final diagnoses:   Acute chest pain       Code Status: Full Code    Allergies:   Allergies   Allergen Reactions     Oxycodone Other (See Comments)     \"TERRIBLE SWEATING\"     Sulfa Drugs      Tetracycline        Activity level - Baseline/Home:  Stand with Assist uses a cane    Activity Level - Current:   Stand with Assist     Needed?: No    Isolation: No  Infection: Not Applicable    Bariatric?: No    Vital Signs:   Vitals:    06/01/18 0900 06/01/18 0915 06/01/18 0930 06/01/18 0945   BP: 156/69 160/68 141/83 162/79   Resp:  18 20 11   Temp:       TempSrc:       SpO2:       Weight:       Height:           Cardiac " Rhythm: ,        Pain level: 0-10 Pain Scale: 2    Is this patient confused?: No   Suicidality: Screened negative    Patient Report: Initial Complaint: chest pressure.  Focused Assessment: pt states chest pressure during the night. Pt discharged yesterday after NSTEMI with 2 stents placed. Pressure is intermittent. Pt did not try any nitro at home.patient alert and oriented. Uses a cane at home.Tests Performed: blood.cxr  Abnormal Results: troponin 0.130. Repeat troponin 0.133  Treatments provided: pt had aspirin and nitro sl x 1. Pts pain is intermittent  Family Comments: wife here    OBS brochure/video discussed/provided to patient: Yes    ED Medications:   Medications   nitroGLYcerin (NITROSTAT) sublingual tablet 0.4 mg (0.4 mg Sublingual Given 6/1/18 0829)   aspirin chewable tablet 324 mg (324 mg Oral Given 6/1/18 0828)       Drips infusing?:  No    For the majority of the shift this patient was Green.   Interventions performed were 0.    Severe Sepsis OR Septic Shock Diagnosis Present: No      ED NURSE PHONE NUMBER: [phone][BB1.1]          Revision History        User Key Date/Time User Provider Type Action    > [N/A] 6/1/2018  1:18 PM Leanna Lara RN Registered Nurse Addend     BB1.1 6/1/2018 12:26 PM Leanna Lara RN Registered Nurse Sign            ED Notes by Farhat Norwood RN at 6/1/2018  7:56 AM     Author:  Farhat Norwood RN Service:  (none) Author Type:  Registered Nurse    Filed:  6/1/2018  7:56 AM Date of Service:  6/1/2018  7:56 AM Creation Time:  6/1/2018  7:56 AM    Status:  Signed :  Farhat Norwood RN (Registered Nurse)         Bed: ED01  Expected date:   Expected time:   Means of arrival:   Comments:     Revision History        User Key Date/Time User Provider Type Action    > KB1.1 6/1/2018  7:56 AM Farhat Norwood RN Registered Nurse Sign            Progress Notes by Rudy Nye RN at 5/31/2018  5:04 PM     Author:  Rudy Nye RN Service:  (none) Author Type:   Registered Nurse    Filed:  5/31/2018  5:05 PM Date of Service:  5/31/2018  5:04 PM Creation Time:  5/31/2018  5:04 PM    Status:  Signed :  Rudy Nye RN (Registered Nurse)          NSG DISCHARGE NOTE    Patient discharged to home at 5:04 PM via wheel chair. Accompanied by spouse and staff. Discharge instructions reviewed with patient and spouse, opportunity offered to ask questions. Prescriptions filled and sent with patient upon discharge. All belongings sent with patient.    Rudy Nye[HK1.1]     Revision History        User Key Date/Time User Provider Type Action    > HK1.1 5/31/2018  5:05 PM Rudy Nye RN Registered Nurse Sign            Progress Notes by Lara Arana, PT at 5/31/2018  3:13 PM     Author:  Lara Arana, PT Service:  (none) Author Type:  Physical Therapist    Filed:  5/31/2018  3:14 PM Date of Service:  5/31/2018  3:13 PM Creation Time:  5/31/2018  3:13 PM    Status:  Signed :  Lara Arana, PT (Physical Therapist)          05/31/18 1345   Quick Adds   Type of Visit Initial PT Evaluation   Living Environment   Lives With spouse   Living Arrangements house   Home Accessibility bed and bath are not on the first floor;grab bars present (bathtub);grab bars present (toilet);stairs to enter home;stairs within home;stairs (2 railings present);tub/shower is not walk in   Number of Stairs to Enter Home 3   Number of Stairs Within Home 7   Stair Railings at Home outside, present at both sides;inside, present on right side   Transportation Available family or friend will provide  (pt relies on spouse for driving)   Self-Care   Dominant Hand right   Usual Activity Tolerance good   Current Activity Tolerance moderate   Regular Exercise yes   Activity/Exercise Type strength training  (theraband exercises)   Exercise Amount/Frequency 2 times/wk   Equipment Currently Used at Home cane, straight;grab bar;shower chair;raised toilet   Activity/Exercise/Self-Care Comment Pt  reports IND with ADLs, spouse assists with IADLs   Functional Level Prior   Ambulation 1-->assistive equipment   Transferring 0-->independent   Toileting 0-->independent   Bathing 1-->assistive equipment   Dressing 0-->independent   Eating 0-->independent   Communication 0-->understands/communicates without difficulty   Swallowing 0-->swallows foods/liquids without difficulty   Cognition 1 - attention or memory deficits   Fall history within last six months yes   Number of times patient has fallen within last six months 2   Which of the above functional risks had a recent onset or change? (functional activity tolerance)   Prior Functional Level Comment Pt reports Mod I with SEC for functional mobility. Pt's spouse provides transportation as pt does not drive.   General Information   Onset of Illness/Injury or Date of Surgery - Date 05/30/18   Referring Physician Khanh Mukherjee MD   Patient/Family Goals Statement To go home   Pertinent History of Current Problem (include personal factors and/or comorbidities that impact the POC) Pt is a 89yo male admitted 5/26 for evaluation of dizziness, nausea and vomiting; initially managed conservatively d/t lack of recurrent symptoms until 5/28 when pt had recurrent chest pain associated with recurrent anterolateral ST depression and dynamic inferior T wave changes; POD#1 s/p MODESTA to proximal LAD and proximal L Cx. PMH significant for chronic systolic CHF, moderate AS, DM, PA s/p LE angioplasty/stenting,CVA, paroxysmal afib, HTN, dyslipidemia.    Precautions/Limitations fall precautions  (L radial and R femoral precautions)   General Observations Pt sitting up in chair, Dr. Emerson at bedside to assess after tele showed run of vtach. Per Dr. Emerson, OK for CR   General Info Comments Activity: Ambulate   Cognitive Status Examination   Orientation orientation to person, place and time   Level of Consciousness alert   Follows Commands and Answers Questions 100% of the time;able to  "follow single-step instructions   Personal Safety and Judgment intact   Pain Assessment   Patient Currently in Pain No   Integumentary/Edema   Integumentary/Edema Comments very [place]   Posture    Posture Forward head position;Protracted shoulders   Range of Motion (ROM)   ROM Comment BLE WNL with AROM   Strength   Strength Comments BLE 4/5 globally, no focal deficits identified between R and L   Bed Mobility   Bed Mobility Comments IND supine<>sit   Transfer Skills   Transfer Comments SBA sit>stand wiht SEC   Gait   Gait Comments SBA 10' with SEC, steady, 2 pt gait pattern   Balance   Balance Comments Appears steady on feet with use of SEC   Sensory Examination   Sensory Perception Comments Denies N/T   General Therapy Interventions   Planned Therapy Interventions gait training;ROM;strengthening;stretching;transfer training;risk factor education;home program guidelines;progressive activity/exercise   Clinical Impression   Criteria for Skilled Therapeutic Intervention yes, treatment indicated   PT Diagnosis Impaired activity tolerance   Influenced by the following impairments Decreased functional activity tolerance s/p stent x2, weakness, SOB   Functional limitations due to impairments Decreased IND and tolerance for functional mobility   Clinical Presentation Evolving/Changing   Clinical Presentation Rationale recent run of [employer] on tele; pt asymptomatic   Clinical Decision Making (Complexity) Low complexity   Therapy Frequency` 2 times/day   Predicted Duration of Therapy Intervention (days/wks) 3 days   Anticipated Discharge Disposition Home with Outpatient Therapy   Risk & Benefits of therapy have been explained Yes   Patient, Family & other staff in agreement with plan of care Yes   [facility] TM \"6 Clicks\"   2016, Trustees of [facility], under license to [employer].  All rights reserved.   6 Clicks Short Forms Basic Mobility Inpatient Short Form   [facility] AM-PAC  \"6 Clicks\" V.2 " Basic Mobility Inpatient Short Form   1. Turning from your back to your side while in a flat bed without using bedrails? 4 - None   2. Moving from lying on your back to sitting on the side of a flat bed without using bedrails? 4 - None   3. Moving to and from a bed to a chair (including a wheelchair)? 3 - A Little   4. Standing up from a chair using your arms (e.g., wheelchair, or bedside chair)? 3 - A Little   5. To walk in hospital room? 3 - A Little   6. Climbing 3-5 steps with a railing? 3 - A Little   Basic Mobility Raw Score (Score out of 24.Lower scores equate to lower levels of function) 20   Total Evaluation Time   Total Evaluation Time (Minutes) 10[KJ1.1]        Revision History        User Key Date/Time User Provider Type Action    > KJ1.1 5/31/2018  3:14 PM Lara Arana, PT Physical Therapist Sign            Progress Notes by Janis Greene PA-C at 5/31/2018 10:12 AM     Author:  Janis Greene PA-C Service:  Cardiology Author Type:  Physician Assistant - C    Filed:  5/31/2018 10:41 AM Date of Service:  5/31/2018 10:12 AM Creation Time:  5/31/2018 10:12 AM    Status:  Attested Addendum :  Janis Greene PA-C (Physician Assistant - C)    Cosigner:  Leatha Price MD at 5/31/2018  1:54 PM        Attestation signed by Leatha Price MD at 5/31/2018  1:54 PM        Physician Attestation   I, LEATHA PRICE, saw and evaluated Dustin Pearce as part of a shared visit.  I have reviewed and discussed with the advanced practice provider their history, physical and plan.    I personally reviewed the vital signs, medications, labs and imaging.    My key history or physical exam findings: Stenting of severe proximal LAD and LCx disease yesterday. Well-developed collaterals to occluded RCA. This area appears infarcted on echo also. He feels better. No chest pain or dyspnea. One episode of NSVT noted this afternoon, asymptomatic. His EF does not justify implantation  of an ICD for primary prevention, even with episodes of NSVT. Heart RRR with systolic murmur. Lungs are clear.     Key management decisions made by me: Doing well after stenting. OK for discharge home if ok with hospitalist team. Cardiology follow up arranged.     LEATHA PRICE  Date of Service (when I saw the patient): 05/31/18                               [facility]  Cardiology Progress Note    Outpatient cardiologist: Dr. Samuel.    Date of Service (when I saw the patient): 05/31/2018    Summary:[AL1.1] Dustin Pearce is a 89 year old male with history of chronic systolic CHF, moderate AS, DM, PA s/p LE angioplasty/stenting, paroxysmal atrial fibrillation, HTN, dyslipidemia who was admitted on 5/26/2018 with dizziness, syncope, n/v, and chest discomfort after vomiting. CT was negative for PE. Cardiology was consulted due to troponin elevated. Initially he was managed conservatively due to lack of recurrent symptoms and the presumption that his underlying illness caused a small, demand infarct. He had recurrent chest discomfort on 5/28 associated with recurrent anterolateral ST depression and dynamic inferior T wave changes. Troponin increased from 0.5 to 0.7. Cardiology was therefore asked to reevaluation.[AL1.2]  Interval History[AL1.1]   Sleeping this morning. Did not awaken. Discussed with his wife. He has been doing well without complaints. Cardiac rehab is planned for later this afternoon.[AL1.2]   Assessment & Plan   1.  CAD. Coronary angiogram done due to recurrent chest discomfort associated with EKG changes and troponin elevation.   -  Angiogram showed severe disease in the proximal LAD and LCx. The RCA is occluded with large left to right collaterals.   -  S/p stenting of the LAD and Lcx with good results. He has mild to moderate diffuse disease which remains.   -  Continue aspirin and plavix, statin, and Toprolol. Imdur started 5/29 and Toprolol dose increased.  Lisinopril resumed  5/30.  2.  Ischemic cardiomyopathy. LVEF has historically been around 35 - 40%. Repeat echocardiogram shows stable LVEF with moderate to severe inferolateral wall hypokinesis and moderate inferior wall hypokinesis.   -  Continue beta blocker, lisinopril. Has not require diuretic therapy.   2.  SIRS syndrome. No clear source of infection identified.   3.  Anemia. Continue to monitor.   4.  PAD.  5.  Chronic systolic congestive heart failure.   6.  Hypertension.   7.  History of CVA.  8.  Mild to moderate aortic stenosis.     Plan:  1.  Continue aspirin and plavix for dual antiplatelet therapy.    2.  On appropriate medications including metoprolol and lisinopril.[AL1.1] Can titrate these up as needed for BP control.[AL1.2]   3.[AL1.1]  He has a podiatry appointment next Friday June 8th and wife would like to follow up that day to make it easier on their schedule - scheduled appointment with ASPEN Whitten on 6/8/18 at 12:30 pm[AL1.2].[AL1.1] Also scheduled an appointment with Dr. Samuel on 8/31/18 at 10:45 pm.   4.  Plan for hopefully home later today[AL1.2]     Janis Greene PA-C    Physical Exam   Temp: 98.6  F (37  C) Temp src: Oral BP: 140/65   Heart Rate: 55 Resp: 18 SpO2: 96 % O2 Device: Nasal cannula Oxygen Delivery: 2 LPM  Vitals:    05/27/18 0319 05/28/18 0100 05/29/18 0556 05/30/18 0316   Weight: 75.3 kg (166 lb) 76 kg (167 lb 8 oz) 74.9 kg (165 lb 1.6 oz) 73.4 kg (161 lb 12.8 oz)    05/31/18 0400   Weight: 73.1 kg (161 lb 3.2 oz)     Vital Signs with Ranges  Temp:  [97.3  F (36.3  C)-99.2  F (37.3  C)] 98.6  F (37  C)  Heart Rate:  [46-64] 55  Resp:  [12-20] 18  BP: (122-169)/(53-77) 140/65  SpO2:  [94 %-96 %] 96 %  05/26 0700 - 05/31 0659  In: 2552 [P.O.:1780; I.V.:772]  Out: 2520 [Urine:2520]  Net: 32    Constitutional:[AL1.1] Sleeping.[AL1.2]    Data[AL1.1]   Results for orders placed or performed during the hospital encounter of 05/26/18 (from the past 24 hour(s))   Activated clotting time POCT    Result Value Ref Range    Activated Clot Time 266 (H) 75 - 150 sec   Inpatient Coronary Angiography Adult Order    Narrative    CARDIAC CATH REPORT:     PROCEDURES PERFORMED:   1. Selective left and right coronary angiography.  2. Percutaneous coronary intervention of the proximal LAD and proximal  LCx.  3. Femoral angiography.  4. Arteriotomy closed with a closure device.  5. Sedation directed by the interventional cardiologist for total time  of 70 minutes.    PHYSICIANS:  1. Attending Interventional Cardiology Staff: Nora Santiago MD  2. Interventional Cardiology Fellow: Khanh Mukherjee MD     INDICATION:  Dustin Pearce is a 89 year old male with hx PAD with prior iliac  stents, cardiomyopathy with EF 35%, HTN, HLD, afib, DM II who  presented to hospital with sepsis and elevated lactate.  He ruled in  for NSTEMI with troponin I of 2.3 and was being medically managed but  had recurrent chest pain again.  He is now referred for coronary  angiogram for resting chest pain and NSTEMI.        DESCRIPTION:  1. Consent obtained with discussion of risks.  All questions were  answered.  2. Sterile prep and procedure.  3. Location: left  radial artery and right left common femoral artery  (after unable to get into aorta from L radial artery).  4. Access: Local anesthetic with lidocaine.  A micropuncture (21 g)  needle with ultrasound guidance was used to establish vascular access  using a modified Seldinger technique.  5. Sheath: 6Fr standard sheath in RCFA and  6Fr Slender sheath in L  radial artery.  6. Catheters: 6Fr JR-4, 6Fr JL-4, 6Fr EBU 3.75 guide.  7. Fluoroscopy time of 18,4 min.  8. Estimated blood loss of <5 mL.  9. See below for procedure details.    MEDICATIONS:  1. Contrast 250 mL IV.  2. Conscious sedation with fentanyl 100 mcg and midazolam 2mg from  1135 to 1245 for total of 70 minutes as directed by the attending  cardiologist.  3. Heparin, verapamil and nitroglycerin intra-arterial for radial  artery  spasm prophylaxis.  4. Heparin administered to achieve a goal ACT > 250 sec.   5. Plavix 300 mg po.    HEMODYNAMICS:  1. HR 43 bpm  2. Ao /43 mmHg      CORONARY ANGIOGRAM:   1. Both coronary arteries arise from their respective cusps.  2. Right dominant.  3. LM has mild luminal irregularities.   4. LAD supplies the entire apex (type 3) and gives rise to septal  perforators, D1 and D2.  The pLAD has a 80-90% calcified tubular  stenosis, mLAD has a 20% stenosis, dLAD has a 30% stenosis, Diagonal  branches have minimal disease.     5. LCX gives rise to OM1 and OM2 vessels.  The pLCx has a 95% stenosis  just distal to high OM1 takeoff, mLCx has a 40% stenosis, dLCx has a  50% stenosis, OM1 has a 50% ostial stenosis and OM2 has a 20%  stenosis. LCx provides collaterals to RCA.      6. RCA is 100% occluded in the proximal portion and receives L-->R  collaterals.       PERCUTANEOUS CORONARY INTERVENTION:  1. LCx lesion:  A 6Fr EBU3.75 guide catheter was positioned at the ostium of the LM/   Heparin was administered to achieve a goal ACT > 250 sec.    A  Runthrough wire was advanced across the prox LCx lesion and positioned  in the distal LCx.  A 3.0x12mm balloon was used to pre-dilate the  lesion.  A 4.0x12mm Synergy drug eluting stent was successfully  deployed across the proxLCx lesion with inflation to 18 berry.   Post-dilation was not performed due to concern of jailing OM branch.    Final angiography showed no evidence of perforation or dissection with  residual stenosis of 10% and MICHELLE 3 flow.  No complications.    2. LAD Lesion:  The Runthrough wire was then used to wire the LAD.  The 4.0x12mm  Emerge balloon was used to dilate pLAD.  A 4.0x15mm Synergy MODESTA was  placed in proxLAD and was post-dilated with 4.5x8mm NC balloon to 24  berry.  In the proximal part of the stent, there still appeared to be  underexpansion of the stent due to piece of calcium despite very high  inflation with NC balloon.  Final  angiography showed no evidence of  perforation or dissection with residual stenosis of 20% and MICHELLE 3  flow.  No complications.      FEMORAL ANGIOGRAPHY  1. The right external iliac and common femoral arteries showed mild  peripheral artery disease.  Access site was confirmed in the common  femoral artery.    ARTERIOTOMY CLOSURE:  1. A 6Fr Angioseal device was used for arteriotomy closure.    COMPLICATIONS:  1. None    SUMMARY:   1. NSTEMI secondary to severe three vessel coronary artery disease  with LCx likely being culprit.  2. Severe three vessel coronary artery disease including  of RCA,  severe proximal LAD and severe proximal LCx disease.  3. 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   4. Successful placement of 4.0x12mm Synergy MODESTA to pLCx and 4.0x16mm  Synergy MODESTA to pLAD.  5. Angioseal closure performed in R CFA.       PLAN:   1. Aspirin 81 mg po daily lifelong.  2. Plavix 75 mg po daily for at least 1 year uninterrupted.  3. Bedrest per protocol (2 hours).  4. Return to the primary inpatient team for further evaluation and  management.  5. Continued medical management and lifestyle modification for  cardiovascular risk factor optimization.     The attending interventional cardiologist, Dr Santiago, was present for  the entire procedure.      Khanh Mukherjee  Cardiology fellow  Pager [phone]   Nutrition Services Adult IP Consult    Narrative    Shanell Conway     5/31/2018 10:16 AM  NUTRITION EDUCATION    REASON FOR ASSESSMENT:  Nutrition education on American Heart Association (AHA) Heart   Healthy Diet.    NUTRITION HISTORY:  Information obtained from patient and wife:   - Pt consumes a regular diet at home, consuming meals TID. Pt   provides meals for himself during breakfast and lunch, and wife   provides dinner. The couple goes out to dinner everyday Saturday   after Mass.   - Typical intake includes: Breakfast - oatmeal or cold cereal   with fruit and 1% milk. Lunch - fruit and or  "cottage cheese   (light meal). Dinner- at home: chicken or turkey dish, out to   eat: steak, shrimp, stir schulte  - Wife states that \"we do not eat a lot of fatty foods.\" She uses   margarine instead of butter for cooking.    - Pt has h/o Type 2 DM and requests handouts and education on   carbohydrates    CURRENT DIET ORDER:  Mod-CHO    NUTRITION DIAGNOSIS:  Food- and nutrition-related knowledge deficit R/t no previous   formal nutrition education on a heart-healthy diet    INTERVENTIONS:  Nutrition Prescription:  ? Recommended AHA Heart Healthy Diet and Mod-CHO Diet     Implementation:  ?  Nutrition Education (Content):  a) reviewed AHA Heart Healthy Diet guidelines  b) provided additional Healthy Heart diet handouts: The   Mediterranean Diet, Understanding Diabetes Booklet.  ? Nutrition Education (Application):  a) Discussed current eating habits and recommended alternative   food choices. Discussed CHO content in food and consuming   consistent amounts throughout the day.   ? Anticipate good compliance  ? Diet Education - refer to Education flowsheet    Goals:  ? Patient verbalizes understanding of diet by asking appropriate   questions and providing examples of how he already practices some   of these guidelines  ? All of the above goals met during education session    Follow Up/Monitoring:  ? RD will be available for future questions          Shanell Conway Dietitian      EKG 12-lead, tracing only    Result Value Ref Range    Interpretation ECG Click View Image link to view waveform and result    Glucose by meter   Result Value Ref Range    Glucose 145 (H) 70 - 99 mg/dL   Glucose by meter   Result Value Ref Range    Glucose 177 (H) 70 - 99 mg/dL   Glucose by meter   Result Value Ref Range    Glucose 160 (H) 70 - 99 mg/dL   Basic metabolic panel   Result Value Ref Range    Sodium 139 133 - 144 mmol/L    Potassium 4.3 3.4 - 5.3 mmol/L    Chloride 105 94 - 109 mmol/L    Carbon Dioxide 30 20 - 32 mmol/L    Anion Gap " 4 3 - 14 mmol/L    Glucose 200 (H) 70 - 99 mg/dL    Urea Nitrogen 10 7 - 30 mg/dL    Creatinine 0.81 0.66 - 1.25 mg/dL    GFR Estimate 89 >60 mL/min/1.7m2    GFR Estimate If Black >90 >60 mL/min/1.7m2    Calcium 8.6 8.5 - 10.1 mg/dL   CBC with platelets   Result Value Ref Range    WBC 8.5 4.0 - 11.0 10e9/L    RBC Count 3.63 (L) 4.4 - 5.9 10e12/L    Hemoglobin 10.5 (L) 13.3 - 17.7 g/dL    Hematocrit 33.1 (L) 40.0 - 53.0 %    MCV 91 78 - 100 fl    MCH 28.9 26.5 - 33.0 pg    MCHC 31.7 31.5 - 36.5 g/dL    RDW 14.5 10.0 - 15.0 %    Platelet Count 148 (L) 150 - 450 10e9/L[AL1.3]     Echocardiogram 5/27/18:  Interpretation Summary  The visual ejection fraction is estimated at 35-40%.  There is moderate to severe inferolateral wall hypokinesis.  There is moderate inferior wall hypokinesis.  The right ventricle is normal in size and function.  The left atrium is mildly dilated.  There is mild mitral stenosis.  Mild to moderate valvular aortic stenosis.  Mild aortic root dilatation.     Tele NSR with BBB    Medications     Continuing ACE inhibitor/ARB/ARNI from home medication list OR ACE inhibitor/ARB order already placed during this visit       - MEDICATION INSTRUCTIONS -       - MEDICATION INSTRUCTIONS -       - MEDICATION INSTRUCTIONS -       Percutaneous Coronary Intervention orders placed (this is information for BPA alerting)         aspirin  81 mg Oral Daily     atorvastatin  40 mg Oral Daily     clopidogrel  75 mg Oral Daily     insulin aspart  1-7 Units Subcutaneous TID AC     insulin aspart  1-5 Units Subcutaneous At Bedtime     isosorbide mononitrate  30 mg Oral Daily     lisinopril  5 mg Oral Daily     magnesium oxide  400 mg Oral Daily     metoprolol tartrate  50 mg Oral BID     sodium chloride (PF)  3 mL Intracatheter Q8H[AL1.1]          Revision History        User Key Date/Time User Provider Type Action    > [N/A] 5/31/2018 10:41 AM Janis Greene PA-C Physician Assistant - C Addend     AL1.2  5/31/2018 10:40 AM Janis Greene PA-C Physician Assistant - C Sign     AL1.3 5/31/2018 10:16 AM Janis Greene PA-C Physician Assistant Camilo CHARLES      AL1.1 5/31/2018 10:12 AM Janis Greene PA-C Physician Assistant - MELVIN                   Procedure Notes     No notes of this type exist for this encounter.         Progress Notes - Therapies (Notes from 05/31/18 through 06/03/18)      Progress Notes by Jacque Kim OT at 6/2/2018  3:52 PM     Author:  Jacque Kim OT Service:  Acute IP Rehab Author Type:  Occupational Therapist    Filed:  6/2/2018  3:52 PM Date of Service:  6/2/2018  3:52 PM Creation Time:  6/2/2018  3:52 PM    Status:  Attested :  Jacque Kim OT (Occupational Therapist)    Cosigner:  Pat Garcia MD at 6/2/2018  5:01 PM        Attestation signed by Pat Garcia MD at 6/2/2018  5:01 PM        Physician Attestation   I agree with the information in this note.    Pat Garcia                                                                                                             [employer] [facility]      OUTPATIENT OCCUPATIONAL THERAPY  EVALUATION  PLAN OF TREATMENT FOR OUTPATIENT REHABILITATION  (COMPLETE FOR INITIAL CLAIMS ONLY)  Patient's Last Name, First Name, M.I.  YOB: 1928  Dustin Pearce                          Provider's Name  [facility] Medical Record No.  [id number]                               Onset Date:  06/01/18   Start of Care Date:   6/2/18     Type:     ___PT   _X_OT   ___SLP Medical Diagnosis:   Chest pain                        OT Diagnosis:  decreased ADLs and functional mobility   Visits from SOC:  1   _________________________________________________________________________________  Plan of Treatment/Functional Goals    Planned Interventions: ADL retraining, cognition, transfer training, home program guidelines, progressive  activity/exercise, risk factor education,       Goals: See Occupational Therapy Goals on Care Plan in [facility] electronic health record.    Therapy Frequency:  (Eval and treatment only)  Predicted Duration of Therapy Intervention:    _________________________________________________________________________________    I CERTIFY THE NEED FOR THESE SERVICES FURNISHED UNDER        THIS PLAN OF TREATMENT AND WHILE UNDER MY CARE     (Physician co-signature of this document indicates review and certification of the therapy plan).                 ,      Referring Physician: Pat Garcia MD            Initial Assessment        See Occupational Therapy evaluation dated   in Epic electronic health record.[JG1.1]                     Revision History        User Key Date/Time User Provider Type Action    > JG1.1 6/2/2018  3:52 PM Jacque Kim OT Occupational Therapist Sign            Progress Notes by Jacque Kim OT at 6/2/2018  3:44 PM     Author:  Jacque Kim OT Service:  Acute IP Rehab Author Type:  Occupational Therapist    Filed:  6/2/2018  3:44 PM Date of Service:  6/2/2018  3:44 PM Creation Time:  6/2/2018  3:44 PM    Status:  Signed :  Jacque Kim OT (Occupational Therapist)          06/02/18 1455   Quick Adds   Type of Visit Initial Occupational Therapy Evaluation   Living Environment   Lives With spouse   Living Arrangements house   Home Accessibility stairs to enter home;stairs within home;grab bars present (bathtub);tub/shower is not walk in   Number of Stairs to Enter Home 2   Number of Stairs Within Home 7   Transportation Available family or friend will provide   Self-Care   Dominant Hand right   Usual Activity Tolerance moderate   Current Activity Tolerance fair   Regular Exercise yes   Activity/Exercise Type walking   Exercise Amount/Frequency daily  (1/4 of a mile daily)   Equipment Currently Used at Home cane, straight;grab bar    Activity/Exercise/Self-Care Comment tub with grab bar and bath bench   Functional Level Prior   Ambulation 1-->assistive equipment   Transferring 1-->assistive equipment   Toileting 1-->assistive equipment   Bathing 1-->assistive equipment   Dressing 0-->independent   Eating 0-->independent   Communication 0-->understands/communicates without difficulty   Swallowing 0-->swallows foods/liquids without difficulty   Fall history within last six months yes   Number of times patient has fallen within last six months 4   Prior Functional Level Comment Per pt and wife pt has A with most all IADl's med mgmt, pt does not drive and has been falling more.    General Information   Onset of Illness/Injury or Date of Surgery - Date 06/01/18   Referring Physician Pat Garcia MD   Patient/Family Goals Statement open to TCU   Additional Occupational Profile Info/Pertinent History of Current Problem Dustin Pearce is a 89 year old male medical history significant for diabetes, hypertension, dyslipidemia, anxiety, depression, prior transient ischemic attack with MCI, chronic systolic congestive heart failure, severe 3-vessel coronary artery disease/NSTEMI with recent drug-eluding stent to proximal left anterior descending and proximal left circumflex re admitted observation status to the hospital on 6/1/2018 for chest discomfort.    Precautions/Limitations fall precautions   Heart Disease Risk Factors Age;Gender;Medical history;Diabetes;High blood pressure;Dislipidemia   Cognitive Status Examination   Orientation person;place  (aware of correct month and off on current year)   Level of Consciousness alert   Able to Follow Commands WNL/WFL   Personal Safety (Cognitive) moderate impairment   Cognitive Comment Will continue to monitor cognition during ADl's   Visual Perception   Visual Perception Wears glasses  (macular degeneration)   Sensory Examination   Sensory Quick Adds No deficits were identified   Pain Assessment    Patient Currently in Pain Yes, see Vital Sign flowsheet  (3/10 upper abdomen/chest discomfort)   Range of Motion (ROM)   ROM Comment B UE AROM WFL    Strength   Strength Comments B UE strength WFL, overall decreased strength.   Bed Mobility Skill: Rolling/Turning   Level of [place] - Bed Mobility Skill Rolling Turning minimum assist (75% patients effort)   Assistive Device:  Rolling/Turning bed rails   Bed Mobility Skill: Supine to Sit   Level of [place]: Supine/Sit minimum assist (75% patients effort)   Assistive Device: Supine/Sit bedrail   Transfer Skill: Bed to Chair/Chair to Bed   Level of [place]: Bed to Chair minimum assist (75% patients effort)   Assistive Device - Transfer Skill Bed to Chair Chair to Bed Rehab Eval standard walker   Transfer Skill: Sit to Stand   Level of [place]: Sit/Stand contact guard   Assistive Device for Transfer: Sit/Stand standard walker   Transfer Skill: Toilet Transfer   Level of [place]: Toilet contact guard   Assistive Device standard walker;grab bars   Lower Body Dressing   Level of [place]: Dress Lower Body contact guard   Grooming   Level of [place]: Grooming contact guard   Instrumental Activities of Daily Living (IADL)   Previous Responsibilities meal prep  (wife A's with all IADL's, reports does some cooking)   Activities of Daily Living Analysis   Impairments Contributing to Impaired Activities of Daily Living balance impaired;cognition impaired;strength decreased  (decreased activity tolerance)   General Therapy Interventions   Planned Therapy Interventions ADL retraining;cognition;transfer training;home program guidelines;progressive activity/exercise;risk factor education   Clinical Impression   Criteria for Skilled Therapeutic Interventions Met yes, treatment indicated   OT Diagnosis decreased ADLs and functional mobility   Influenced by the following impairments impaired balance, overall decreased strength, activity tolerance and  "safety   Assessment of Occupational Performance 3-5 Performance Deficits   Identified Performance Deficits impaired I and safety with dressing, toilet and tub transfer, cooking.    Clinical Decision Making (Complexity) Moderate complexity   Therapy Frequency (Eval and treatment only)   Anticipated Discharge Disposition Transitional Care Facility   Risks and Benefits of Treatment have been explained. Yes   Patient, Family & other staff in agreement with plan of care Yes   [facility] AM-PAC  \"6 Clicks\" Daily Activity Inpatient Short Form   1. Putting on and taking off regular lower body clothing? 3 - A Little   2. Bathing (including washing, rinsing, drying)? 3 - A Little   3. Toileting, which includes using toilet, bedpan or urinal? 3 - A Little   4. Putting on and taking off regular upper body clothing? 3 - A Little   5. Taking care of personal grooming such as brushing teeth? 3 - A Little   6. Eating meals? 4 - None   Daily Activity Raw Score (Score out of 24.Lower scores equate to lower levels of function) 19   Total Evaluation Time   Total Evaluation Time (Minutes) 10[JG1.1]        Revision History        User Key Date/Time User Provider Type Action    > JG1.1 6/2/2018  3:44 PM Jacque Kim OT Occupational Therapist Sign            Progress Notes by Lara Arana PT at 5/31/2018  3:13 PM     Author:  Lara Arana PT Service:  (none) Author Type:  Physical Therapist    Filed:  5/31/2018  3:14 PM Date of Service:  5/31/2018  3:13 PM Creation Time:  5/31/2018  3:13 PM    Status:  Signed :  Lara Arana PT (Physical Therapist)          05/31/18 1345   Quick Adds   Type of Visit Initial PT Evaluation   Living Environment   Lives With spouse   Living Arrangements house   Home Accessibility bed and bath are not on the first floor;grab bars present (bathtub);grab bars present (toilet);stairs to enter home;stairs within home;stairs (2 railings present);tub/shower is not walk in "   Number of Stairs to Enter Home 3   Number of Stairs Within Home 7   Stair Railings at Home outside, present at both sides;inside, present on right side   Transportation Available family or friend will provide  (pt relies on spouse for driving)   Self-Care   Dominant Hand right   Usual Activity Tolerance good   Current Activity Tolerance moderate   Regular Exercise yes   Activity/Exercise Type strength training  (theraband exercises)   Exercise Amount/Frequency 2 times/wk   Equipment Currently Used at Home cane, straight;grab bar;shower chair;raised toilet   Activity/Exercise/Self-Care Comment Pt reports IND with ADLs, spouse assists with IADLs   Functional Level Prior   Ambulation 1-->assistive equipment   Transferring 0-->independent   Toileting 0-->independent   Bathing 1-->assistive equipment   Dressing 0-->independent   Eating 0-->independent   Communication 0-->understands/communicates without difficulty   Swallowing 0-->swallows foods/liquids without difficulty   Cognition 1 - attention or memory deficits   Fall history within last six months yes   Number of times patient has fallen within last six months 2   Which of the above functional risks had a recent onset or change? (functional activity tolerance)   Prior Functional Level Comment Pt reports Mod I with SEC for functional mobility. Pt's spouse provides transportation as pt does not drive.   General Information   Onset of Illness/Injury or Date of Surgery - Date 05/30/18   Referring Physician Khanh Mukherjee MD   Patient/Family Goals Statement To go home   Pertinent History of Current Problem (include personal factors and/or comorbidities that impact the POC) Pt is a 89yo male admitted 5/26 for evaluation of dizziness, nausea and vomiting; initially managed conservatively d/t lack of recurrent symptoms until 5/28 when pt had recurrent chest pain associated with recurrent anterolateral ST depression and dynamic inferior T wave changes; POD#1 s/p MODESTA  to proximal LAD and proximal L Cx. PMH significant for chronic systolic CHF, moderate AS, DM, PA s/p LE angioplasty/stenting,CVA, paroxysmal afib, HTN, dyslipidemia.    Precautions/Limitations fall precautions  (L radial and R femoral precautions)   General Observations Pt sitting up in chair, Dr. Emerson at bedside to assess after tele showed run of vtach. Per Dr. Emerson, OK for CR   General Info Comments Activity: Ambulate   Cognitive Status Examination   Orientation orientation to person, place and time   Level of Consciousness alert   Follows Commands and Answers Questions 100% of the time;able to follow single-step instructions   Personal Safety and Judgment intact   Pain Assessment   Patient Currently in Pain No   Integumentary/Edema   Integumentary/Edema Comments very [place]   Posture    Posture Forward head position;Protracted shoulders   Range of Motion (ROM)   ROM Comment BLE WNL with AROM   Strength   Strength Comments BLE 4/5 globally, no focal deficits identified between R and L   Bed Mobility   Bed Mobility Comments IND supine<>sit   Transfer Skills   Transfer Comments SBA sit>stand wiht SEC   Gait   Gait Comments SBA 10' with SEC, steady, 2 pt gait pattern   Balance   Balance Comments Appears steady on feet with use of SEC   Sensory Examination   Sensory Perception Comments Denies N/T   General Therapy Interventions   Planned Therapy Interventions gait training;ROM;strengthening;stretching;transfer training;risk factor education;home program guidelines;progressive activity/exercise   Clinical Impression   Criteria for Skilled Therapeutic Intervention yes, treatment indicated   PT Diagnosis Impaired activity tolerance   Influenced by the following impairments Decreased functional activity tolerance s/p stent x2, weakness, SOB   Functional limitations due to impairments Decreased IND and tolerance for functional mobility   Clinical Presentation Evolving/Changing   Clinical Presentation Rationale recent run of  "vtach on tele; pt asymptomatic   Clinical Decision Making (Complexity) Low complexity   Therapy Frequency` 2 times/day   Predicted Duration of Therapy Intervention (days/wks) 3 days   Anticipated Discharge Disposition Home with Outpatient Therapy   Risk & Benefits of therapy have been explained Yes   Patient, Family & other staff in agreement with plan of care Yes   [facility] TM \"6 Clicks\"   2016, Trustees of [facility], under license to [employer]  All rights reserved.   6 Clicks Short Forms Basic Mobility Inpatient Short Form   [facility]  \"6 Clicks\" V.2 Basic Mobility Inpatient Short Form   1. Turning from your back to your side while in a flat bed without using bedrails? 4 - None   2. Moving from lying on your back to sitting on the side of a flat bed without using bedrails? 4 - None   3. Moving to and from a bed to a chair (including a wheelchair)? 3 - A Little   4. Standing up from a chair using your arms (e.g., wheelchair, or bedside chair)? 3 - A Little   5. To walk in hospital room? 3 - A Little   6. Climbing 3-5 steps with a railing? 3 - A Little   Basic Mobility Raw Score (Score out of 24.Lower scores equate to lower levels of function) 20   Total Evaluation Time   Total Evaluation Time (Minutes) 10[KJ1.1]        Revision History        User Key Date/Time User Provider Type Action    > KJ1.1 5/31/2018  3:14 PM Lara Araan, PT Physical Therapist Sign            "

## 2018-06-01 NOTE — IP AVS SNAPSHOT
` ` Patient Information     Patient Name Sex     Dustin Pearce ([id number]) Male 1928       Room Bed    0259 0259-01      Patient Demographics     Address Phone E-mail Address    [address] [address] [phone] (Home)  none (Work)  none (Mobile) [email or web address]      Patient Ethnicity & Race     Ethnic Group Patient Race    American White      Emergency Contact(s)     Name Relation Home Work Mobile    Halina Dhillon Friend none [phone] none      Documents on File        Status Date Received Description       Documents for the Patient    Privacy Notice - [place] Received 17     Insurance Card  () 03     Face Sheet  () 03     External Medication Information Consent Accepted () 10/24/10     Patient ID Received () 12     Consent for Services - Hospital/Clinic Received () 10/24/10     Insurance Card  ()      Insurance Card Received () 12     Consent for Services - Hospital/Clinic Received () 12     Consent for Services - Hospital/Clinic Received () 12     Consent for EHR Access  13 Copied from existing Consent for services - C/HOD collected on 2012    [facility] Specified Other       Consent for Services - Hospital/Clinic Received () 13     Insurance Card Received () 13     Patient ID Received () 13     Consent to Communicate Received 14 phi    Consent for Services - Hospital/Clinic Received () 14     Consent for Services/Privacy Notice - Hospital/Clinic Received () 07/10/16     Insurance Card Received () 16 Medicare    Insurance Card Received () 16 bcbs    Patient ID Received 16 [place] - [place]    External Medication Information Consent Accepted 10/20/16     Speech Therapy Certification Received 12/15/16 Video swallow study    Insurance Card Received () 17  Medicare / BCBS [place]    Consent for Services/Privacy Notice - Hospital/Clinic Received 07/24/17     Care Everywhere Prospective Auth Received 07/24/17     HIM SAVANAH Authorization  01/05/18     Consent for Services - Geriatrics Received 02/12/18     Insurance Card Received 03/12/18 BCBS    Insurance Card Received 03/12/18 Medicare    Patient ID Received 03/26/18 Lifetime    Consent for Services - Hospital and Clinic Received but Refused Insurance Consent 04/19/18     HIE Auth Received 04/25/18     Occupational Therapy Certification Sent 06/02/18        Documents for the Encounter    CMS IM for Patient Signature       CMS [place] for Patient Signature Received 06/02/18       Admission Information     Attending Provider Admitting Provider Admission Type Admission Date/Time    Bronson Whitaker MD Rauniyar, Amit Kumar, MD Emergency 06/01/18  0756    Discharge Date Hospital Service Auth/Cert Status Service Area     Hospitalist [facility]    Unit Room/Bed Admission Status        CARDIAC SPEC CARE 0259/0259-01 Admission (Confirmed)       Admission     Complaint    Chest pain      Hospital Account     Name Acct ID Class Status Primary Coverage    Dustin Pearce [id number] Observation Open MEDICARE - MEDICARE            Guarantor Account (for Hospital Account #[id number])     Name Relation to Pt Service Area Active? Acct Type    Dustin Pearce  FCS Yes Personal/Family    Address Phone          [address] [place] [address] [phone](H)  none(O)              Coverage Information (for Hospital Account #[id number])     1. MEDICARE/MEDICARE     F/O Payor/Plan Precert #    MEDICARE/MEDICARE     Subscriber Subscriber #    Dustin Pearce [id number]    Address Phone    [employer]  [address]  [place] [address] [phone]          2. [place]     F/O Payor/Plan Precert #    [place]     Subscriber Subscriber #    Dustin Pearce [id number]     Address Phone    [address] [address]  [place] [address] [phone]

## 2018-06-01 NOTE — IP AVS SNAPSHOT
MRN:[id number]                      After Visit Summary   6/1/2018    Dustin Pearce    MRN: [id number]           Thank you!     Thank you for choosing [facility] for your care. Our goal is always to provide you with excellent care. Hearing back from our patients is one way we can continue to improve our services. Please take a few minutes to complete the written survey that you may receive in the mail after you visit with us. Thank you!        Patient Information     Date Of Birth          1/31/1928        Designated Caregiver       Most Recent Value    Caregiver    Will someone help with your care after discharge? yes    Name of designated caregiver deana    Phone number of caregiver [phone]    Caregiver address [address], [place], [facility]      About your hospital stay     You were admitted on:  June 1, 2018 You last received care in the:  [facility]    You were discharged on:  Naya 3, 2018        Reason for your hospital stay       You were re admitted to the hospital with chest discomfort.    Chest discomfort in the setting of severe 3-vessel disease, status post recent stents to left anterior descending and left circumflex.     Ischemic cardiomyopathy EF 35 - 40 %  Dyspnea on exertion.   Uncontrolled diabetes mellitus:  A1c is 9.1.  Acute on chronic mild normocytic anemia with GABBY.    Deconditioned from acute illness/chronic debility/age.  Recent SIRS syndrome, resolved                  Who to Call     For medical emergencies, please call 911.  For non-urgent questions about your medical care, please call your primary care provider or clinic, [phone]          Attending Provider     Provider Specialty    Chrissy Lezama MD Emergency Medicine    [facility]       Primary Care Provider Office Phone # Fax #    Matt Morrissey [phone]      After Care Instructions     Activity - Up with  nursing assistance           Additional Discharge Instructions       Aggressive incentives spirometry.  Monitor blood sugars two times a day, optimize diabetic regimen at the time of discharge [has uncontrolled diabetes mellitus with hemoglobin A1c of 9, used to be on oral metformin, had recent SIRS/abdominal discomfort with CAD and hence discontinued)  Follow iron studies/hemoglobin level in 8 to 10 weeks.  Follow-up with cardiology as per schedule  Discuss long-term goals of care on providers/PCP visit.            Advance Diet as Tolerated       Follow this diet upon discharge: Orders Placed This Encounter  low-carb,  2 gm NA Diet; Low Saturated Fat Diet            Daily weights       Call Provider for weight gain of more than 2 pounds per day or 5 pounds per week.            Fall precautions           General info for SNF       Length of Stay Estimate: Short Term Care: Estimated # of Days <30  Condition at Discharge: Improving  Level of care:skilled   Rehabilitation Potential: Good  Admission H&P remains valid and up-to-date: Yes  Recent Chemotherapy: N/A  Use Nursing Home Standing Orders: Yes            Glucose monitor nursing POCT       Before meals and at bedtime. Inform provider if blood sugars greater than 300.            Mantoux instructions       Give two-step Mantoux (PPD) Per Facility Policy Yes                  Follow-up Appointments     Follow Up and recommended labs and tests       Follow up with detention physician.  The following labs/tests are recommended: BMP in 5 - 7 days.                  Your next 10 appointments already scheduled     Jun 08, 2018 12:30 PM CDT   Return Discharge with ASPEN Rosales CNP   [facility] ([facility])    [address] [place] [address]   [phone] OPT 2            Jun 08, 2018  2:00 PM CDT   Office Visit with Matt Morrissey MD   [facility]    [address] [address] [phone]           Bring a current list of meds and any records pertaining to this visit. For Physicals, please bring immunization records and any forms needing to be filled out. Please arrive 10 minutes early to complete paperwork.            Jul 12, 2018  6:30 PM CDT   Office Visit with Matt [facility]orissey MD   [facility]    [address] [address] [phone]           Bring a current list of meds and any records pertaining to this visit. For Physicals, please bring immunization records and any forms needing to be filled out. Please arrive 10 minutes early to complete paperwork.            Aug 31, 2018 10:45 AM CDT   Return Visit with Rafa Samuel MD   [facility] ([facility])    [address]  [place] [address] [phone] OPT 2              Additional Services     Occupational Therapy Adult Consult       Evaluate and treat as clinically indicated.    Reason: physical deconditioning            Physical Therapy Adult Consult       Evaluate and treat as clinically indicated.    Reason: physical deconditioning                  General Recommendations To Control Heart Failure When You Get Home       Heart Failure Instructions for Patients and Families: Please read and check off each of these important instructions as you do them when you get home.          Weight and Symptoms       ___ Put a scale in your bathroom.    ___ Post a weight chart or calendar next to your scale.    ___ Weigh yourself everyday as soon as you get up in the morning (before breakfast).  You should only be wearing your pajamas.  Write your weight on the chart/calendar.  ___ Bring your weight chart/calendar with you to all appointments.  ___ Call your doctor or nurse practitioner if you gain 2 pounds (in 1 day) or 5 pounds in (1 week) from your goal  good  weight.  Your good  weight is also called your  dry  weight.  Your doctor or nurse will tell you what your good weight should be.    ___ Call your doctor or nurse practitioner if you have shortness of breath that gets worse over time, leg swelling or fatigue.       Medications and Diet       ___ Make sure to take your medication as prescribed.    ___ Bring a current list of your medication and all of your medicine bottles with you to all appointments.    ___ Limit fluids if you still have swelling or shortness of breath, or if your doctor tells you to do so.    ___ Eat less than 2000 mg of sodium (salt) every day. Read food labels, and do not add salt to meals. Remember, if you eat less salt you retain less fluid.  ___ Follow a heart healthy diet that is low in saturated fat.        Activity and Suggested Lifestyle Changes      ___ Stay active. Talk to your doctor about an exercise program that is safe for your heart.  ___ Stop smoking. Reduce alcohol use.      ___ Lose weight if you are overweight. Extra weight puts a lot of stress on the heart.                 Control for Leg Swelling     ___ Keep your legs elevated (raised) as needed for swelling. If swelling is uncomfortable or elevation doesn t help, ask your doctor about using ACE wraps or support stockings.        What is the [facility].R[facility]E. Clinic?  Cardiomyopathy  Optimization  Rehabilitation  Education    The [facility] Clinic is a heart failure specialty clinic within [facility].  It is an outpatient disease management program that is based on a phase-by-phase approach, which is tailored to each patient s individual needs.  The cardiologist, nurse practitioner, physician assistant and nurses provide an ongoing outpatient care and treatment plan that guides heart failure and cardiomyopathy patients from evaluation and education to stabilization. This team works with your current primary care doctor and cardiologist to help you:    - Avoid hospitalizations  - Slow the  progression of the disease  - Improve length and quality of life  - Know who and when to call if heart failure symptoms appear  - Receive easy access to quality health care and advice  - Better understand your condition and treatment  - Decrease the tremendous cost burden of heart failure care  - Detect future heart problems before they become life threatening                                 Follow-up Appointments     ___ An appointment with the [facility] Clinic may already have been made for you (see section   Your next appointments already scheduled ).  If you have a question about your appointment, would like to speak with a C.O.R.E. nurse, or would like to become a [facility].R[facility] Clinic patient, please call one of the following locations:     - [facility] [place]):                                             [phone]  - [facility]):                                            [phone]  - [facility]                 [phone]      ___ Your C.O.R.E. Clinic Team will continue to educate you on your heart failure and may adjust medications based on your vital signs, lab work, and how you are feeling.  Therefore, it is very important to bring the following to all C.O.R.E. appointments:    - An accurate list of your medications  - Your medicine bottles  - Your weight chart/calendar                   Pending Results     Date and Time Order Name Status Description    6/2/2018 1532 Folate In process     6/2/2018 0800 EKG 12-lead, tracing only Preliminary     6/2/2018 0553 EKG 12-lead, tracing only Preliminary             Statement of Approval     Ordered          06/03/18 1136  I have reviewed and agree with all the recommendations and orders detailed in this document.  EFFECTIVE NOW     Approved and electronically signed by:  Pat Garcia MD             Admission Information     Date & Time Provider Department Dept. Phone     "2018 Bronson Whitaker MD [facility] [phone]      Your Vitals Were     Blood Pressure Pulse Temperature Respirations Height Weight    93/39 (BP Location: Left arm) 60 98.1  F (36.7  C) (Oral) 18 1.651 m (5' 5\") 71.8 kg (158 lb 6.4 oz)    Pulse Oximetry BMI (Body Mass Index)                92% 26.36 kg/m2          [employer] Information     [employer] lets you send messages to your doctor, view your test results, renew your prescriptions, schedule appointments and more. To sign up, go to [email or web address] . Click on \"Log in\" on the left side of the screen, which will take you to the Welcome page. Then click on \"Sign up Now\" on the right side of the page.     You will be asked to enter the access code listed below, as well as some personal information. Please follow the directions to create your username and password.     Your access code is: [email or web address]  Expires: 7/10/2018  6:16 PM     Your access code will  in 90 days. If you need help or a new code, please call your [place] clinic or [phone].        Care EveryWhere ID     This is your Care EveryWhere ID. This could be used by other organizations to access your [place] medical records  [id number]        Equal Access to Services     [facility] : Hadii kinza lucas Socharley, waaxda luqadaha, qaybta kaalmada kofi, maurice miranda. So [facility] [phone].    ATENCIÓN: Si habla español, tiene a garvey disposición servicios gratuitos de asistencia lingüística. Jose Luis saha [phone].    We comply with applicable federal civil rights laws and [place] laws. We do not discriminate on the basis of race, color, national origin, age, disability, sex, sexual orientation, or gender identity.               Review of your medicines      START taking        Dose / Directions    ferrous gluconate 324 (38 Fe) MG tablet   Commonly known as:  FERGON   Used for:  Iron deficiency anemia, unspecified " iron deficiency anemia type        Dose:  324 mg   Take 1 tablet (324 mg) by mouth daily (with breakfast)   Quantity:  100 tablet   Refills:  0       furosemide 20 MG tablet   Commonly known as:  LASIX   Used for:  Coronary artery disease involving native heart, angina presence unspecified, unspecified vessel or lesion type        Dose:  20 mg   Take 1 tablet (20 mg) by mouth every other day Hold if systolic bp < 100 or patients appears dehydrated or having poor oral intake   Quantity:  30 tablet   Refills:  0       insulin glargine 100 UNIT/ML injection   Commonly known as:  LANTUS SOLOSTAR   Used for:  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 If we are uncertain of the size of tablets/capsules you have at home, strength may be listed as something that might have changed.        Dose / Directions    isosorbide mononitrate 30 MG 24 hr tablet   Commonly known as:  IMDUR   This may have changed:  how much to take   Used for:  Coronary artery disease involving native heart, angina presence unspecified, unspecified vessel or lesion type        Dose:  60 mg   Take 2 tablets (60 mg) by mouth daily   Quantity:  60 tablet   Refills:  0         CONTINUE these medicines which have NOT CHANGED        Dose / Directions    AMITRIPTYLINE HCL PO        Dose:  25 mg   Take 25 mg by mouth nightly as needed for sleep   Refills:  0       aspirin 81 MG EC tablet   Used for:  Transient cerebral ischemia, unspecified type        Dose:  81 mg   Take 1 tablet (81 mg) by mouth daily   Quantity:  30 tablet   Refills:  0       ATORVASTATIN CALCIUM PO        Dose:  40 mg   Take 40 mg by mouth At Bedtime   Refills:  0       bisacodyl 10 MG Suppository   Commonly known as:  DULCOLAX        Dose:  10 mg   Place 10 mg rectally daily as needed for constipation    Refills:  0       Cholecalciferol 28609 units Tabs        Dose:  1 tablet   Take 1 tablet by mouth three times a week (Mon, Wed, Fri)   Refills:  0       DULoxetine 30 MG EC capsule   Commonly known as:  CYMBALTA   Used for:  Polyneuropathy associated with underlying disease (H)        Dose:  30 mg   Take 1 capsule (30 mg) by mouth daily   Quantity:  90 capsule   Refills:  3       glipiZIDE 10 MG tablet   Commonly known as:  GLUCOTROL   Used for:  Type 2 diabetes mellitus with diabetic peripheral angiopathy without gangrene, without long-term current use of insulin (H)        Dose:  10 mg   Take 1 tablet (10 mg) by mouth 2 times daily (before meals)   Quantity:  180 tablet   Refills:  3       ipratropium 0.03 % spray   Commonly known as:  ATROVENT   Used for:  Runny nose        INHALE 1 SPRAY IN EACH NOSTRIL EVERY 12 HOURS AS NEEDED   Quantity:  30 mL   Refills:  0       lisinopril 5 MG tablet   Commonly known as:  PRINIVIL/ZESTRIL   Used for:  Coronary artery disease involving native heart, angina presence unspecified, unspecified vessel or lesion type        Dose:  5 mg   Take 1 tablet (5 mg) by mouth daily   Quantity:  30 tablet   Refills:  1       magnesium oxide 400 (241.3 Mg) MG tablet   Commonly known as:  MAG-OX   Used for:  Constipation, unspecified constipation type        TAKE 1 TABLET BY MOUTH TWICE DAILY   Quantity:  60 tablet   Refills:  0       metoprolol tartrate 50 MG tablet   Commonly known as:  LOPRESSOR   Used for:  Coronary artery disease involving native heart, angina presence unspecified, unspecified vessel or lesion type        Dose:  50 mg   Take 1 tablet (50 mg) by mouth 2 times daily   Quantity:  60 tablet   Refills:  1       nitroGLYcerin 0.4 MG sublingual tablet   Commonly known as:  NITROSTAT   Used for:  Coronary artery disease involving native heart, angina presence unspecified, unspecified vessel or lesion type        For chest pain place 1 tablet under the tongue every 5 minutes  for 3 doses. If symptoms persist 5 minutes after 1st dose call 911.   Quantity:  25 tablet   Refills:  0       OMEPRAZOLE PO        Dose:  40 mg   Take 40 mg by mouth 2 times daily   Refills:  0       order for DME   Used for:  Type 2 diabetes mellitus without complication (H)        Equipment being ordered: True Matrix Blood Glucose meter.   Quantity:  1 Device   Refills:  0       PLAVIX PO   Used for:  Cough        Dose:  75 mg   Take 75 mg by mouth daily   Refills:  0       polyethylene glycol Packet   Commonly known as:  MIRALAX/GLYCOLAX        Dose:  17 g   Take 17 g by mouth daily as needed for constipation   Refills:  0       tamsulosin 0.4 MG capsule   Commonly known as:  FLOMAX   Used for:  Benign non-nodular prostatic hyperplasia with lower urinary tract symptoms        Dose:  0.4 mg   Take 1 capsule (0.4 mg) by mouth daily   Quantity:  90 capsule   Refills:  3       TRUE METRIX BLOOD GLUCOSE TEST test strip   Used for:  Hypoglycemia   Generic drug:  blood glucose monitoring        USE TO TEST BLOOD SUGAR THREE TIMES DAILY OR AS DIRECTED   Quantity:  300 strip   Refills:  0         STOP taking     METFORMIN HCL PO                Where to get your medicines      These medications were sent to [facility] [id number] [place] [address] [address] [facility] [address], [facility] [address]     Phone:  [phone]     ferrous gluconate 324 (38 Fe) MG tablet    insulin glargine 100 UNIT/ML injection         Some of these will need a paper prescription and others can be bought over the counter. Ask your nurse if you have questions.     You don't need a prescription for these medications     furosemide 20 MG tablet    isosorbide mononitrate 30 MG 24 hr tablet                Protect others around you: Learn how to safely use, store and throw away your medicines at www.disposemymeds.org.             Medication List: This is a list of all your medications and when to take them.  Check marks below indicate your daily home schedule. Keep this list as a reference.      Medications           Morning Afternoon Evening Bedtime As Needed    AMITRIPTYLINE HCL PO   Take 25 mg by mouth nightly as needed for sleep                                aspirin 81 MG EC tablet   Take 1 tablet (81 mg) by mouth daily   Last time this was given:  81 mg on 6/3/2018  9:51 AM                                ATORVASTATIN CALCIUM PO   Take 40 mg by mouth At Bedtime   Last time this was given:  40 mg on 6/2/2018  9:23 PM                                bisacodyl 10 MG Suppository   Commonly known as:  DULCOLAX   Place 10 mg rectally daily as needed for constipation                                Cholecalciferol 38179 units Tabs   Take 1 tablet by mouth three times a week (Mon, Wed, Fri)                                DULoxetine 30 MG EC capsule   Commonly known as:  CYMBALTA   Take 1 capsule (30 mg) by mouth daily   Last time this was given:  30 mg on 6/3/2018  9:51 AM                                ferrous gluconate 324 (38 Fe) MG tablet   Commonly known as:  FERGON   Take 1 tablet (324 mg) by mouth daily (with breakfast)                                furosemide 20 MG tablet   Commonly known as:  LASIX   Take 1 tablet (20 mg) by mouth every other day Hold if systolic bp < 100 or patients appears dehydrated or having poor oral intake   Last time this was given:  20 mg on 6/3/2018  9:51 AM                                glipiZIDE 10 MG tablet   Commonly known as:  GLUCOTROL   Take 1 tablet (10 mg) by mouth 2 times daily (before meals)   Last time this was given:  10 mg on 6/3/2018  9:51 AM                                insulin glargine 100 UNIT/ML injection   Commonly known as:  LANTUS SOLOSTAR   Inject 14 Units Subcutaneous At Bedtime                                ipratropium 0.03 % spray   Commonly known as:  ATROVENT   INHALE 1 SPRAY IN EACH NOSTRIL EVERY 12 HOURS AS NEEDED                                isosorbide  mononitrate 30 MG 24 hr tablet   Commonly known as:  IMDUR   Take 2 tablets (60 mg) by mouth daily   Last time this was given:  60 mg on 6/3/2018  9:51 AM                                lisinopril 5 MG tablet   Commonly known as:  PRINIVIL/ZESTRIL   Take 1 tablet (5 mg) by mouth daily   Last time this was given:  5 mg on 6/3/2018  9:51 AM                                magnesium oxide 400 (241.3 Mg) MG tablet   Commonly known as:  MAG-OX   TAKE 1 TABLET BY MOUTH TWICE DAILY                                metoprolol tartrate 50 MG tablet   Commonly known as:  LOPRESSOR   Take 1 tablet (50 mg) by mouth 2 times daily   Last time this was given:  50 mg on 6/3/2018  9:51 AM                                nitroGLYcerin 0.4 MG sublingual tablet   Commonly known as:  NITROSTAT   For chest pain place 1 tablet under the tongue every 5 minutes for 3 doses. If symptoms persist 5 minutes after 1st dose call 911.   Last time this was given:  0.4 mg on 6/2/2018  8:14 AM                                OMEPRAZOLE PO   Take 40 mg by mouth 2 times daily   Last time this was given:  40 mg on 6/3/2018  9:50 AM                                order for DME   Equipment being ordered: True Matrix Blood Glucose meter.                                PLAVIX PO   Take 75 mg by mouth daily   Last time this was given:  75 mg on 6/3/2018  9:51 AM                                polyethylene glycol Packet   Commonly known as:  MIRALAX/GLYCOLAX   Take 17 g by mouth daily as needed for constipation                                tamsulosin 0.4 MG capsule   Commonly known as:  FLOMAX   Take 1 capsule (0.4 mg) by mouth daily   Last time this was given:  0.4 mg on 6/3/2018  9:51 AM                                TRUE METRIX BLOOD GLUCOSE TEST test strip   USE TO TEST BLOOD SUGAR THREE TIMES DAILY OR AS DIRECTED   Generic drug:  blood glucose monitoring

## 2018-06-01 NOTE — H&P
Admitted:     06/01/2018      PRIMARY CARE PHYSICIAN:  Dr. Matt Morrissey.      CHIEF COMPLAINT:  Chest pressure.      HISTORY OF PRESENT ILLNESS:  History was reviewed from the patient and his wife present by the bedside and from recent hospitalization records.  Mr. Pearce is a 89-year-old male with a past medical history as listed below, notably for CAD with recent stents, who presented to the ER today with complaints of chest discomfort.  He was just admitted from 05/26 to 05/31/2018 with non-ST elevation MI, had a cardiac catheterization on 05/30/2018 and was noted with severe 3-vessel disease including  of RCA, severe proximal LAD and severe proximal left circumflex disease.  He received drug-eluting stents to LAD and left circumflex and was just discharged yesterday.  He states since last night started having chest pressure, which has been constant, rating it at 4/10.  History is a bit confusing here because the patient's says the symptoms have been going on for a couple of days, whereas wife states that the patient started complaining of symptoms since last night only.  He denies any fever, chills or rigors.  Does report some dyspnea on exertion.  Denies pain in the abdomen.  Reports a normal bowel and bladder habit.  In the ER, he was seen by Dr. Lezama.  Workup was mostly unremarkable, except for still detectable troponin, elevated BNP.  Dr. Lezama talked to Dr. Emerson from Cardiology, and hospitalist was requested admission for further evaluation.  He received aspirin in the ER.  He has not taken his Plavix today.      REVIEW OF SYSTEMS:  A 10-point review of system was done and was negative, apart from those mentioned in the History of Present Illness.      PAST MEDICAL HISTORY:   1.  Recent non-ST-elevation MI with severe 3-vessel CAD including  of RCA, severe proximal LAD and severe proximal left circumflex disease, status post MODESTA to LAD and left circumflex.   2.  Chronic systolic CHF, not on  diuresis.   3.  Prior TIA.   4.  Diabetes.   5.  Hypertension.     6.  Dyslipidemia.   7.  Anemia.   8.  Chronic depression.   9.  GERD.   10.  BPH.      MEDICATIONS PRIOR TO ADMISSION:  Prescriptions Prior to Admission   Medication Sig Dispense Refill Last Dose     AMITRIPTYLINE HCL PO Take 25 mg by mouth nightly as needed for sleep   Past Month at more than 2 weeks ago     aspirin EC 81 MG EC tablet Take 1 tablet (81 mg) by mouth daily 30 tablet 0 5/31/2018 at Unknown time     ATORVASTATIN CALCIUM PO Take 40 mg by mouth At Bedtime    5/31/2018 at pm     bisacodyl (DULCOLAX) 10 MG Suppository Place 10 mg rectally daily as needed for constipation   prn med taken more than a month ago     Cholecalciferol 92774 UNITS TABS Take 1 tablet by mouth three times a week (Mon, Wed, Fri)   5/31/2018 at Unknown time     Clopidogrel Bisulfate, [id number], (PLAVIX PO) Take 75 mg by mouth daily    5/31/2018 at Unknown time     DULoxetine (CYMBALTA) 30 MG EC capsule Take 1 capsule (30 mg) by mouth daily 90 capsule 3 5/31/2018 at Unknown time     glipiZIDE (GLUCOTROL) 10 MG tablet Take 1 tablet (10 mg) by mouth 2 times daily (before meals) 180 tablet 3 5/31/2018 at Unknown time     ipratropium (ATROVENT) 0.03 % spray INHALE 1 SPRAY IN EACH NOSTRIL EVERY 12 HOURS AS NEEDED 30 mL 0 prn med     isosorbide mononitrate (IMDUR) 30 MG 24 hr tablet Take 1 tablet (30 mg) by mouth daily 30 tablet 1 5/31/2018 at am     lisinopril (PRINIVIL/ZESTRIL) 5 MG tablet Take 1 tablet (5 mg) by mouth daily 30 tablet 1 5/31/2018 at am     magnesium oxide (MAG-OX) 400 (241.3 Mg) MG tablet TAKE 1 TABLET BY MOUTH TWICE DAILY 60 tablet 0 5/31/2018 at Unknown time     METFORMIN HCL PO Take 1,000 mg by mouth 2 times daily (with meals)   5/31/2018 at pm     metoprolol tartrate (LOPRESSOR) 50 MG tablet Take 1 tablet (50 mg) by mouth 2 times daily 60 tablet 1 5/31/2018 at am     nitroGLYcerin (NITROSTAT) 0.4 MG sublingual tablet For chest pain place 1 tablet under  the tongue every 5 minutes for 3 doses. If symptoms persist 5 minutes after 1st dose call 911. 25 tablet 0      OMEPRAZOLE PO Take 40 mg by mouth 2 times daily    5/31/2018 at pm     polyethylene glycol (MIRALAX/GLYCOLAX) Packet Take 17 g by mouth daily as needed for constipation   5/31/2018     tamsulosin (FLOMAX) 0.4 MG capsule Take 1 capsule (0.4 mg) by mouth daily 90 capsule 3 5/31/2018 at Unknown time     order for DME Equipment being ordered: True Matrix Blood Glucose meter. 1 Device 0 Unknown     TRUE METRIX BLOOD GLUCOSE TEST test strip USE TO TEST BLOOD SUGAR THREE TIMES DAILY OR AS DIRECTED 300 strip 0         ALLERGIES:  OXYCODONE, SULFA DRUGS, TETRACYCLINE.      SOCIAL HISTORY:  He quit smoking about 19 years ago.  Prior to that, had 30-pack-year smoking history.  Takes occasional alcohol.  No illicit drug use.      FAMILY HISTORY:  Reviewed and not pertinent to current presentation.      PHYSICAL EXAMINATION:   GENERAL:  The patient is conscious, alert, oriented x 3, lying comfortably in bed in no apparent distress.   VITAL SIGNS:  Temperature 99.1, heart rate of 67, blood pressure 162/79, saturation 95% on room air.   HEENT:  Pupils are equal and reactive to light and accommodation.  Extraocular movements are intact.  Oral mucosa is moist.   NECK:  Supple.  No raised JVD.   RESPIRATORY:  Lung sounds bilaterally clear to auscultation.  No wheezes or crepitation.   CARDIOVASCULAR:  Normal S1, S2.  Regular rate and rhythm.  No murmur.   ABDOMEN:  Soft, nontender, nondistended.  No guarding, rigidity or rebound tenderness.   LOWER EXTREMITIES:  With no edema.   NEUROLOGIC:  No focal neurological deficits noted.  Cranial nerves II-XII grossly intact.   PSYCHIATRIC:  Normal mood and affect.      LABORATORY AND IMAGING:  Reviewed in Epic.  CMP mostly unremarkable.  BNP elevated at 10,184.  Troponin I elevated at 0.133, which is trending down from recent hospitalization.  Glucose 224.  CBC with a WBC of 7.5,  hemoglobin 10.5, platelet 155.      Chest x-ray reviewed by me shows no acute infiltrate, effusion or pneumothorax.  Per Radiology read, there is stable subtle opacification of left lateral lung base, likely atelectasis or scarring.  EKG reviewed by me shows normal sinus rhythm with frequent PVCs and bifascicular block.      ASSESSMENT AND PLAN:  Mr. Dustin Pearce is a 89-year-old male with past medical history significant for diabetes, hypertension, dyslipidemia, anxiety, depression, prior transient ischemic attack with MCI, chronic systolic congestive heart failure, severe 3-vessel coronary artery disease with recent drug-eluding stent to proximal left anterior descending and proximal left circumflex, who presented to the ER today with complaints of chest discomfort.     1.  Chest discomfort in the setting of severe 3-vessel disease, status post recent stents to left anterior descending and left circumflex.  Initial EKG and trops are unremarkable.  Troponin is elevated, but trending down from recent evaluation.  Will admit him for observation, monitor him on telemetry, and follow serial troponins.  Dr. Emerson from Cardiology has been contacted and will evaluate the patient.  Will follow the trend.  If trending up, he might need repeat angiogram.  Will continue with his aspirin and Plavix.  Will have sublingual nitroglycerin p.r.n.     2.  Diabetes mellitus type 2.  Recent HbA1c was 9.1.  Will hold off on his metformin, resume glipizide, start sliding scale insulin.   3.  Hypertension.  Resume lisinopril, metoprolol when verified by Pharmacy.  Will also have hydralazine p.r.n.   4.  Dyslipidemia.  Resume atorvastatin.   5.  Gastroesophageal reflux disease.  Resume Prilosec.   6.  BPH.  Resume Flomax.   7.  Chronic depression.  Resume Cymbalta.     8.  Chronic systolic congestive heart failure.  He looks euvolemic.  He is not on any diuretics.  Will monitor clinically.  Will monitor ins and outs.   9.  Deep venous  thrombosis prophylaxis:  Mechanical with PCD boots.      CODE STATUS:  Full code.        The patient will be registered under observation.         DANGELO MULLEN MD             D: 2018   T: 2018   MT: DT      Name:     SID AGUIAR   MRN:      [id number]        Account:      [id number]   :      1928        Admitted:     2018                   Document: [id number]

## 2018-06-01 NOTE — ED PROVIDER NOTES
"  History     Chief Complaint:  Chest pressure and shortness of breath    HPI   Dustin Ramses is a 89 year old male with a complex medical history of hypertension, CVA, diabetes type II, atrial fibrillation on Plavix and ASA, CAD and NSTEMI status post cardiac stents, CHF with EF, carotid artery stenosis status post recent endarterectomy who presents to the ED for evaluation of chest pressure. The patient was just discharged yesterday 5/31 after being admitted for NSTEMI 5/26 and having two stents placed. The patient comes in this morning through triage because he started feeling a \"pressure\" in his chest last night while he was resting. He states it has been intermittent since last night. He mentions some accompanying shortness of breath and expresses that his position doesn't seem to impact his symptoms. The chest pressure and shortness of breath feel different from how he felt when he presented to the hospital 5/26 with an NSTEMI. The patient denies nausea, vomiting, LH, diaphoresis.    Allergies:  Oxycodone  Sulfa Drugs  Tetracycline     Medications:    Amitriptyline  Plavix  Aspirin  Atorvastatin  Dulcolax  Cholecalciferol  Clopidogrel Bisulfate  Glucotrol  Lisinopril  Atrovent  Cymbalta  Nitrostat  Mag-Ox  Imdur  Miralax  Omeprazole  Flomax     Past Medical History:    Aortic root dilation  CHF with EF  Aortic stenosis  Hypertension  BPH  CAD  Cardiomyopathy  Diabetes type II  DJD  GERD  NSTEMI  Hyperlipidemia  Hypertension  Hyperlipidemia  Nephrolithiasis  Osteopenia  Mild cognitive impairment  PONV  Cerebral artery occlusion with CVA  PAD  Atrial fibrillation  RBBB  Recurrent UTI  Sepsis    Past Surgical History:    Amputate foot  Cholecystectomy  Endarterectomy carotid  EGD  Kidney stone removal  Endoscopy  Hernia repair  I&D foot  Rotator cuff repair    Family History:    Breast Cancer- mother  Heart failure- father    Social History:  The patient was in the ED alone.  Smoking Status: Former smoker 30 " "yrs  Smokeless Tobacco: Never  Alcohol Use: Yes  Marital Status:        Review of Systems   Constitutional: Negative for diaphoresis.   Respiratory: Positive for shortness of breath.    Cardiovascular: Positive for chest pain.   Gastrointestinal: Negative for nausea and vomiting.   Neurological: Negative for light-headedness.   All other systems reviewed and are negative.    Physical Exam   First Vitals:  Patient Vitals for the past 24 hrs:   BP Temp Temp src Heart Rate Resp SpO2 Height Weight   06/01/18 0945 162/79 - - 67 11 - - -   06/01/18 0930 141/83 - - 68 20 - - -   06/01/18 0915 160/68 - - 68 18 - - -   06/01/18 0900 156/69 - - - - - - -   06/01/18 0845 155/73 - - - - - - -   06/01/18 0802 177/78 99.1  F (37.3  C) Oral 76 20 95 % 1.651 m (5' 5\") 72.6 kg (160 lb)   06/01/18 0800 177/78 - - 75 18 94 % - -     Physical Exam  General/Appearance: appears stated age, well-groomed, appears comfortable, lying down flat in bed without any evidence of discomfort or SOB  Eyes: EOMI, no scleral injection, no icterus  ENT: MMM  Neck: supple, nl ROM, no stiffness  Cardiovascular: RRR, nl S1S2, no m/r/g, 2+ pulses in all 4 extremities, cap refill <2sec  Respiratory: CTAB, good air movement throughout, no wheezes/rhonchi/rales, no increased WOB, no retractions  Back: no lesions  GI: abd soft, non-distended, nttp,  no HSM, no rebound, no guarding, nl BS  MSK: FERNANDEZ, good tone, no bony abnormality  Skin: warm and well-perfused, no rash, no edema, no ecchymosis, nl turgor  Neuro: GCS 15, alert and oriented, no gross focal neuro deficits  Psych: interacts appropriately  Heme: no petechia, no purpura, no active bleeding    Emergency Department Course   ECG done at 0753. ECG read at 0754. Indication: Chest pressure, SOB  Rate 71 bpm. MT interval 150. QRS duration 132. QT/QTc 458/497. P-R-T axes 66 -66 56.  Sinus rhythm with frequent premature ventricular complexes and fusion complexes with escape complexes.  Possible left " atrial enlargement.  Right bundle branch block.  Left anterior fascicular block.  ** Bifascicular block **  Abnormal ECG.    Imaging:  Radiographic findings were communicated with the patient who voiced understanding of the findings.    XR Chest 2 Views:  IMPRESSION: Stable subtle opacification of the left lateral lung base,  likely atelectasis or scarring as before. No new focal pulmonary  opacities otherwise. No pleural effusions or pneumothorax. Stable  heart and mediastinum.  Per Radiology.    Laboratory:  CBC: WBC 7.5, HGB 10.5(L),   BMP: Glucose 224(H) o/w WNL (Creat 0.70)  Troponin: 0.130(HH)  Repeat Troponin: 0.133(HH)  BNP: 25081(H)    Interventions:  0804 Nitro sublingual tablet 0.4mg    0805 Aspirin chewable tablet 324mg PO    Emergency Department Course:  Nursing notes and vitals reviewed.  I performed an exam of the patient as documented above.     1107 I rechecked the patient.    1157 I rechecked the patient and offered him admission. He would like 30 minutes to think about whether or not he wants to stay in the hospital.    1207 Nursing staff reported that the patient decided he would like to be admitted.    1212 Discussed the patient with Dr. Whitaker, who will admit the patient to an Obs bed for further monitoring, evaluation, and treatment.    1228 I spoke with Dr. Emerson over the phone, who will watch the patient.    Impression & Plan      Medical Decision Making:  Dustin Pearce is a 89 year old male who was just admitted for an end STEMI status post 2 stent placement 2 days ago, discharged yesterday, who began having chest pressure or shortness of breath last night, just hours after discharge.  Although the symptoms are not the same as what brought him in initially for his previous admission I am concerned as he has diffuse coronary artery disease that he may benefit from additional cardiac intervention such as additional cath or additional medication management.  Delta troponins are fairly  stable.  They are elevated however trending downwards from the last one drawn from his hospital admission.  I suspect these are still downtrending however I do think, given his significant vascular history, that he would benefit from coming into the hospital for continued troponin management and cardiology involvement.  Family and staff feel comfortable with this plan.      Diagnosis:    ICD-10-CM    1. Acute chest pain R07.9      Disposition:  The patient was admitted to the hospital.    6/1/2018    EMERGENCY DEPARTMENT    I, Jacque Her, am serving as a scribe at [facility] on June 1, 2018 to document services personally performed by Dr. Lezama, based on my observations and the provider's statements to me.       Chrissy Lezama MD  06/01/18 0997

## 2018-06-01 NOTE — IP AVS SNAPSHOT
[facility] CARDIAC SPECIALTY CARE: [phone]                                              INTERAGENCY TRANSFER FORM - PHYSICIAN ORDERS   6/1/2018                   CHF Orders/Instructions for Nursing Home/TCU     CHF Orders and Instructions for Nursing Home/TCU  1.  Have the patient get a scale to put in their room and then use at home.  2.  Post a weight chart or calendar in room next to the scale.  3.  Ask patient to weigh themselves daily first thing when they get up in the morning, before breakfast, with only their night gown on and record on the chart/calendar (if able).  4.  Record NH/TCU admission weight.  5.  Record daily am weight, with same clothes every day. If patient is in a wheelchair, note weight of wheel chair.   6.  Provide patient CHF education booklet and review with patient and family.  7.  Vital signs twice daily and prn. Check oxygen sats prn dyspnea.  8.  Apply Oxygen 2 or 3 liters/min by nasal cannula prn O2 Sat < 90%.   9.  Notify NP/MD:   1. If HR <50 bpm, SBP <90mmHg, or O2 Sat < 90%.   2. If weight is up more than 2 lbs. in one day or 5 lbs. in one week from their admission weight OR if patient has signs or symptoms of CHF, such as worsening dyspnea or leg edema.  10.  ACE-wraps or support stockings (knee high) to bilateral lower extremities prn edema. On in am and off at HS.   11.  Diet: 2 gm sodium cardiac diet, with additional diet modifications if ordered elsewhere.  12.  Fluid restriction:  1500cc/day, if hospital discharge sodium < 134.  13.  Dietician: CHF education  14.  PT/OT to Evaluate and Treat for deconditioning and CHF.  12.  Activity as tolerated   13.  PT to notify RN if wheelchair equipment has been modified (change in weight should also be noted on the patients weight calendar).    Heart Failure Follow-up Appointments and Instructions for TCU Discharge   _____An appointment to enroll the patient into [facility] Clinic may already have been made (see section  " Your next appointments already scheduled ).  If there is a question about the appointment or you would like to speak with a C.O.R.E. nurse, please call one of the following locations:     [facility]):                                                    [phone]    [facility] ([place]):                                                   [phone]    [facility] ([place]):                   [phone]    _____If no C.O.R.E. appointment was made, please call one of the locations and schedule:    NP/PA appointment 14 days after hospital discharge if still in TCU    NP/PA appointment for 3-5 days after discharge from TCU    _____Have patient bring their med list and daily vitals/weight chart with them to appointment.    _____At discharge from the TCU give the patient the \"General Recommendations to Control Heart Failure When You Get Home  information for them to take home and their scale.  _____Upon discharge from the TCU reinforce the importance of home management of CHF including follow up in [facility].  What is the [facility] Clinic?  Cardiomyopathy  Optimization  Rehabilitation  Education  The [facility] Clinic is a heart failure specialty clinic within [facility].  It is an outpatient disease management program that is based on a phase-by-phase approach, which is tailored to each patient s individual needs.  The cardiologist, nurse practitioner, physician assistant and nurses provide an ongoing outpatient care and treatment plan that guides heart failure and cardiomyopathy patients from evaluation and education to stabilization. This team works with the current primary care doctor and cardiologist to help patients:    Avoid hospitalizations    Slow the progression of the disease    Improve length and quality of life    Know who and when to call if heart failure symptoms appear    Receive easy access to quality health care and " "advice    Better understand your condition and treatment    Decrease the tremendous cost burden of heart failure care    Detect future heart problems before they become life threatening         Hospital Admission Date: 2018  SID AGUIAR   : 1928  Sex: Male        Attending Provider: Bronson Whitaker MD     Allergies:  Oxycodone, Sulfa Drugs, Tetracycline    Infection:  None   Service:  HOSPITALIST    Ht:  1.651 m (5' 5\")   Wt:  71.8 kg (158 lb 6.4 oz)   Admission Wt:  72.6 kg (160 lb)    BMI:  26.36 kg/m 2   BSA:  1.82 m 2            Patient PCP Information     Provider PCP Type    Matt Morrissey MD General    Matt Morrissey MD Internal Medicine      ED Clinical Impression     Diagnosis Description Comment Added By Time Added    Acute chest pain [R07.9] Acute chest pain [R07.9]  Chrissy Lezama MD 2018 12:08 PM      Hospital Problems as of 6/3/2018              Priority Class Noted POA    Chest discomfort Medium  2018 Unknown      Non-Hospital Problems as of 6/3/2018              Priority Class Noted    Coronary artery disease involving native coronary artery of native heart without angina pectoris Medium  10/20/2016    Mild cognitive impairment Medium  10/20/2016    Hyperlipidemia, unspecified hyperlipidemia type Medium  10/20/2016    Benign non-nodular prostatic hyperplasia with lower urinary tract symptoms Medium  10/20/2016    Gastroesophageal reflux disease without esophagitis Medium  10/20/2016    Cerebrovascular accident (H) Medium  10/20/2016    Carotid artery stenosis Medium  10/20/2016    Abdominal aortic aneurysm (H) Medium  2016    Lumbar back pain Medium  2016    Benign essential hypertension Medium  2017    TIA (transient ischemic attack) Medium  2017    Paroxysmal atrial fibrillation (H) Medium  Unknown    Ischemic cardiomyopathy Medium  Unknown    Aortic root dilatation (H) Medium  Unknown    Physical deconditioning Medium  2017    " Diabetic ulcer of left foot associated with diabetes mellitus due to underlying condition (H) Medium  6/12/2017    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) Medium  6/12/2017    Anemia due to blood loss, acute Medium  6/13/2017    Diarrhea, unspecified type Medium  6/16/2017    Nausea and vomiting, intractability of vomiting not specified, unspecified vomiting type Medium  6/21/2017    Acute pain of left shoulder Medium  8/7/2017    Balance problems Medium  8/7/2017    Generalized muscle weakness Medium  8/7/2017    Peripheral artery disease (H) Medium  Unknown    Aortic stenosis Medium  Unknown    RBBB with left anterior fascicular block Medium  Unknown    Stroke of unknown etiology (H) Medium  12/31/2017    Carotid stenosis Medium  1/3/2018    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) Medium  1/5/2018    History of TIA (transient ischemic attack) and stroke Medium  1/5/2018    UTI (urinary tract infection) Medium  2/8/2018    UTI (urinary tract infection) due to Enterococcus Medium  2/22/2018    Generalized weakness Medium  2/22/2018    Type 2 diabetes mellitus with hyperglycemia, unspecified long term insulin use status (H) Medium  2/22/2018    Atherosclerosis of coronary artery of native heart without angina pectoris, unspecified vessel or lesion type Medium  2/22/2018    Depression, unspecified depression type Medium  2/22/2018    Slow transit constipation Medium  2/22/2018    Severe sepsis (H) Medium  5/27/2018      Code Status History     Date Active Date Inactive Code Status Order ID Comments User Context    6/3/2018 11:36 AM  Full Code [id number]  Pat Garcia MD Outpatient    6/1/2018  2:20 PM 6/3/2018 11:36 AM Full Code [id number]  Bronson Whitaker MD Inpatient    5/31/2018 11:30 AM 6/1/2018  2:20 PM Full Code [id number]  Brandyn Graves PA Outpatient    5/27/2018  3:14 AM 5/31/2018 11:30 AM Full Code [id number]  Amilcar Sharma MD Inpatient     2/11/2018 12:16 PM 5/27/2018  3:14 AM Full Code [id number]  Matt Purcell MD Outpatient    2/8/2018  5:21 AM 2/11/2018 12:16 PM Full Code [id number]  Blaze Martinez MD Inpatient    1/4/2018  3:04 PM 2/8/2018  5:21 AM Full Code [id number]  Gagan Jerome MD Outpatient    12/31/2017  1:57 PM 1/4/2018  3:04 PM Full Code [id number]  Meseret Tidwell MD Inpatient    6/9/2017 10:07 AM 12/31/2017  1:57 PM Full Code [id number]  Faustino Aguilar MD Outpatient    5/31/2017  8:56 PM 6/9/2017 10:07 AM Full Code [id number]  Blaze Torres MD Inpatient    1/21/2017  3:57 PM 5/31/2017  8:56 PM Full Code [id number]  Tra Reynoso MD Outpatient    1/20/2017  1:55 PM 1/21/2017  3:57 PM Full Code [id number]  Blaze Rose MD Inpatient    12/28/2016  1:26 PM 1/20/2017  1:55 PM Full Code [id number]  Paloma Chen MD Outpatient    12/25/2016  3:13 AM 12/28/2016  1:26 PM Full Code [id number]  Faustino Aguilar MD Inpatient    3/2/2012 11:45 AM 12/25/2016  3:13 AM Full Code [id number]  Wilmar Medrano MD Outpatient         Medication Review      START taking        Dose / Directions Comments    ferrous gluconate 324 (38 Fe) MG tablet   Commonly known as:  FERGON   Used for:  Iron deficiency anemia, unspecified iron deficiency anemia type        Dose:  324 mg   Take 1 tablet (324 mg) by mouth daily (with breakfast)   Quantity:  100 tablet   Refills:  0        furosemide 20 MG tablet   Commonly known as:  LASIX   Used for:  Coronary artery disease involving native heart, angina presence unspecified, unspecified vessel or lesion type        Dose:  20 mg   Take 1 tablet (20 mg) by mouth every other day Hold if systolic bp < 100 or patients appears dehydrated or having poor oral intake   Quantity:  30 tablet   Refills:  0        insulin glargine 100 UNIT/ML injection   Commonly known as:  LANTUS SOLOSTAR   Used for:  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 If we are uncertain of the size of tablets/capsules you have at home, strength may be listed as something that might have changed.        Dose / Directions Comments    isosorbide mononitrate 30 MG 24 hr tablet   Commonly known as:  IMDUR   This may have changed:  how much to take   Used for:  Coronary artery disease involving native heart, angina presence unspecified, unspecified vessel or lesion type        Dose:  60 mg   Take 2 tablets (60 mg) by mouth daily   Quantity:  60 tablet   Refills:  0          CONTINUE these medications which have NOT CHANGED        Dose / Directions Comments    AMITRIPTYLINE HCL PO        Dose:  25 mg   Take 25 mg by mouth nightly as needed for sleep   Refills:  0        aspirin 81 MG EC tablet   Used for:  Transient cerebral ischemia, unspecified type        Dose:  81 mg   Take 1 tablet (81 mg) by mouth daily   Quantity:  30 tablet   Refills:  0        ATORVASTATIN CALCIUM PO        Dose:  40 mg   Take 40 mg by mouth At Bedtime   Refills:  0        bisacodyl 10 MG Suppository   Commonly known as:  DULCOLAX        Dose:  10 mg   Place 10 mg rectally daily as needed for constipation   Refills:  0        Cholecalciferol 51500 units Tabs        Dose:  1 tablet   Take 1 tablet by mouth three times a week (Mon, Wed, Fri)   Refills:  0        DULoxetine 30 MG EC capsule   Commonly known as:  CYMBALTA   Used for:  Polyneuropathy associated with underlying disease (H)        Dose:  30 mg   Take 1 capsule (30 mg) by mouth daily   Quantity:  90 capsule   Refills:  3        glipiZIDE 10 MG tablet   Commonly known as:  GLUCOTROL   Used for:  Type 2 diabetes mellitus with diabetic peripheral angiopathy without gangrene, without long-term current use of insulin (H)        Dose:  10 mg   Take 1 tablet (10 mg) by mouth 2 times daily (before meals)    Quantity:  180 tablet   Refills:  3        ipratropium 0.03 % spray   Commonly known as:  ATROVENT   Used for:  Runny nose        INHALE 1 SPRAY IN EACH NOSTRIL EVERY 12 HOURS AS NEEDED   Quantity:  30 mL   Refills:  0        lisinopril 5 MG tablet   Commonly known as:  PRINIVIL/ZESTRIL   Used for:  Coronary artery disease involving native heart, angina presence unspecified, unspecified vessel or lesion type        Dose:  5 mg   Take 1 tablet (5 mg) by mouth daily   Quantity:  30 tablet   Refills:  1        magnesium oxide 400 (241.3 Mg) MG tablet   Commonly known as:  MAG-OX   Used for:  Constipation, unspecified constipation type        TAKE 1 TABLET BY MOUTH TWICE DAILY   Quantity:  60 tablet   Refills:  0        metoprolol tartrate 50 MG tablet   Commonly known as:  LOPRESSOR   Used for:  Coronary artery disease involving native heart, angina presence unspecified, unspecified vessel or lesion type        Dose:  50 mg   Take 1 tablet (50 mg) by mouth 2 times daily   Quantity:  60 tablet   Refills:  1        nitroGLYcerin 0.4 MG sublingual tablet   Commonly known as:  NITROSTAT   Used for:  Coronary artery disease involving native heart, angina presence unspecified, unspecified vessel or lesion type        For chest pain place 1 tablet under the tongue every 5 minutes for 3 doses. If symptoms persist 5 minutes after 1st dose call 911.   Quantity:  25 tablet   Refills:  0        OMEPRAZOLE PO        Dose:  40 mg   Take 40 mg by mouth 2 times daily   Refills:  0        order for DME   Used for:  Type 2 diabetes mellitus without complication (H)        Equipment being ordered: True Matrix Blood Glucose meter.   Quantity:  1 Device   Refills:  0        PLAVIX PO   Used for:  Cough        Dose:  75 mg   Take 75 mg by mouth daily   Refills:  0        polyethylene glycol Packet   Commonly known as:  MIRALAX/GLYCOLAX        Dose:  17 g   Take 17 g by mouth daily as needed for constipation   Refills:  0        tamsulosin  0.4 MG capsule   Commonly known as:  FLOMAX   Used for:  Benign non-nodular prostatic hyperplasia with lower urinary tract symptoms        Dose:  0.4 mg   Take 1 capsule (0.4 mg) by mouth daily   Quantity:  90 capsule   Refills:  3        TRUE METRIX BLOOD GLUCOSE TEST test strip   Used for:  Hypoglycemia   Generic drug:  blood glucose monitoring        USE TO TEST BLOOD SUGAR THREE TIMES DAILY OR AS DIRECTED   Quantity:  300 strip   Refills:  0          STOP taking     METFORMIN HCL PO                   Summary of Visit     Reason for your hospital stay       You were re admitted to the hospital with chest discomfort.    Chest discomfort in the setting of severe 3-vessel disease, status post recent stents to left anterior descending and left circumflex.     Ischemic cardiomyopathy EF 35 - 40 %  Dyspnea on exertion.   Uncontrolled diabetes mellitus:  A1c is 9.1.  Acute on chronic mild normocytic anemia with GABBY.    Deconditioned from acute illness/chronic debility/age.  Recent SIRS syndrome, resolved             After Care     Activity - Up with nursing assistance           Additional Discharge Instructions       Aggressive incentives spirometry.  Monitor blood sugars two times a day, optimize diabetic regimen at the time of discharge [has uncontrolled diabetes mellitus with hemoglobin A1c of 9, used to be on oral metformin, had recent SIRS/abdominal discomfort with CAD and hence discontinued)  Follow iron studies/hemoglobin level in 8 to 10 weeks.  Follow-up with cardiology as per schedule  Discuss long-term goals of care on providers/PCP visit.       Advance Diet as Tolerated       Follow this diet upon discharge: Orders Placed This Encounter  low-carb,  2 gm NA Diet; Low Saturated Fat Diet       Daily weights       Call Provider for weight gain of more than 2 pounds per day or 5 pounds per week.       Fall precautions           General info for SNF       Length of Stay Estimate: Short Term Care: Estimated # of  Days <30  Condition at Discharge: Improving  Level of care:skilled   Rehabilitation Potential: Good  Admission H&P remains valid and up-to-date: Yes  Recent Chemotherapy: N/A  Use Nursing Home Standing Orders: Yes       Glucose monitor nursing POCT       Before meals and at bedtime. Inform provider if blood sugars greater than 300.       Mantoux instructions       Give two-step Mantoux (PPD) Per Facility Policy Yes             Referrals     Occupational Therapy Adult Consult       Evaluate and treat as clinically indicated.    Reason: physical deconditioning       Physical Therapy Adult Consult       Evaluate and treat as clinically indicated.    Reason: physical deconditioning             Your next 10 appointments already scheduled     Jun 08, 2018 12:30 PM CDT   Return Discharge with ASPEN Rosales CNP   [facility] ([facility])    [address]  [place] [address]   [phone] OPT 2            Jun 08, 2018  2:00 PM CDT   [facility]    [address] [id number] [phone]           Bring a current list of meds and any records pertaining to this visit. For Physicals, please bring immunization records and any forms needing to be filled out. Please arrive 10 minutes early to complete paperwork.            Jul 12, 2018  6:30 PM CDT   [facility]    [address] [address] [phone]           Bring a current list of meds and any records pertaining to this visit. For Physicals, please bring immunization records and any forms needing to be filled out. Please arrive 10 minutes early to complete paperwork.            Aug 31, 2018 10:45 AM CDT   Return Visit with Rafa Samuel MD   [facility] ([facility])    [address] [address] [phone] OPT 2              Follow-Up Appointment Instructions     Future Labs/Procedures    Follow Up and recommended labs and tests     Comments:    Follow up with halfway physician.  The following labs/tests are recommended: BMP in 5 - 7 days.      Follow-Up Appointment Instructions     Follow Up and recommended labs and tests       Follow up with halfway physician.  The following labs/tests are recommended: BMP in 5 - 7 days.             Statement of Approval     Ordered          06/03/18 1136  I have reviewed and agree with all the recommendations and orders detailed in this document.  EFFECTIVE NOW     Approved and electronically signed by:  Pat Garcia MD               General Recommendations To Control Heart Failure When You Get Home (Give at DC from TCU)       Heart Failure Instructions for Patients and Families: Please read and check off each of these important instructions as you do them when you get home.          Weight and Symptoms       ___ Put a scale in your bathroom.    ___ Post a weight chart or calendar next to your scale.    ___ Weigh yourself everyday as soon as you get up in the morning (before breakfast).  You should only be wearing your pajamas.  Write your weight on the chart/calendar.  ___ Bring your weight chart/calendar with you to all appointments.  ___ Call your doctor or nurse practitioner if you gain 2 pounds (in 1 day) or 5 pounds in (1 week) from your goal  good  weight.  Your good weight is also called your  dry  weight.  Your doctor or nurse will tell you what your good weight should be.    ___ Call your doctor or nurse practitioner if you have shortness of breath that gets worse over time, leg swelling or fatigue.       Medications and Diet       ___ Make sure to take your medication as prescribed.    ___ Bring a current list of your medication and all of your medicine bottles with you to all  appointments.    ___ Limit fluids if you still have swelling or shortness of breath, or if your doctor tells you to do so.    ___ Eat less than 2000 mg of sodium (salt) every day. Read food labels, and do not add salt to meals. Remember, if you eat less salt you retain less fluid.  ___ Follow a heart healthy diet that is low in saturated fat.        Activity and Suggested Lifestyle Changes      ___ Stay active. Talk to your doctor about an exercise program that is safe for your heart.  ___ Stop smoking. Reduce alcohol use.      ___ Lose weight if you are overweight. Extra weight puts a lot of stress on the heart.                 Control for Leg Swelling     ___ Keep your legs elevated (raised) as needed for swelling. If swelling is uncomfortable or elevation doesn t help, ask your doctor about using ACE wraps or support stockings.        What is the [facility]?  Cardiomyopathy  Optimization  Rehabilitation  Education    The [facility] is a heart failure specialty clinic within [facility].  It is an outpatient disease management program that is based on a phase-by-phase approach, which is tailored to each patient s individual needs.  The cardiologist, nurse practitioner, physician assistant and nurses provide an ongoing outpatient care and treatment plan that guides heart failure and cardiomyopathy patients from evaluation and education to stabilization. This team works with your current primary care doctor and cardiologist to help you:    - Avoid hospitalizations  - Slow the progression of the disease  - Improve length and quality of life  - Know who and when to call if heart failure symptoms appear  - Receive easy access to quality health care and advice  - Better understand your condition and treatment  - Decrease the tremendous cost burden of heart failure care  - Detect future heart problems before they become life threatening                                 Follow-up Appointments     ___ An  appointment with the [facility] may already have been made for you (see section   Your next appointments already scheduled ).  If you have a question about your appointment, would like to speak with a C.O.R.E. nurse, or would like to become a [facility] Clinic patient, please call one of the following locations:     - [facility]):                                             [phone]  - [facility]):                                            [phone]  - [facility] ([place]):                 [phone]      ___ Your [facility] Clinic Team will continue to educate you on your heart failure and may adjust medications based on your vital signs, lab work, and how you are feeling.  Therefore, it is very important to bring the following to all C.O.R.E. appointments:    - An accurate list of your medications  - Your medicine bottles  - Your weight chart/calendar

## 2018-06-01 NOTE — CONSULTS
[facility]    Cardiology Consultation    Date of Admission:  6/1/2018  Assessment & Plan   Dustin Pearce is a 89 year old male who was admitted on 6/1/2018. I was asked to see the patient for recurrent chest pain in the setting of recent stenting.    Summary:   1.  Chest/epigastric discomfort.   2.  CAD with severe three vessel disease s/p stenting of the LAD and LCx on 5/30.   -  Continue aspirin, plavix, statin, and imdur.   3.  Ischemic cardiomyopathy  LVEF 35 - 40%.   -  Continue metoprolol, lisinopril. He has no required diuretic therapy.   4.  Hypertension.   5.  DM type II.   6.  Anemia.    Impression/plan:  Dustin presents with several hours of epigastric discomfort in the setting of recent intervention and drug eluting stent placement to his LAD and LCx on 5/30. He tells me this pain is entirely different from the pain he was experiencing last hospitalization, although he cannot describe this pain for me. On exam, it seems to be more localized to the epigastric region although he is not significantly tender.  His LFTs and lipase were unremarkable.    His troponin levels are trending down from .651 on 5/29 to 0.130. Repeat levels have been flat. At this point his pain seems non-cardiac in nature. ? Gastritis. We will continue to follow his troponin levels and monitor for any recurrence of his pain. We will also watch him on telemetry.  If his troponin levels were to rise or he were to have convincing recurrent pain, we could consider repeat coronary angiography but would not pursue this at this point.    He should continue on aspirin, plavix, lisinopril, and metoprolol. If his BPs remain elevated, we could increase his lisinopril. If he has no recurrent symptoms and troponins remain flat/trending, he can likely be discharged this weekend. Cardiology follow up has already been arranged in the clinic for next week.     Janis Greene PA-C    Reason for Consult   Reason for consult: I was  asked by Dr. Whitaker to evaluate this patient for chest discomfort.    Primary Care Physician   Matt Morrissey    Chief Complaint   Chest pain    History is obtained from the patient and wife.    History of Present Illness   Dustin Pearce is a 89 year old male with a history of chronic systolic CHF with a LVEF of 35 - 40%, moderate AS, DM, PA s/p LE angioplasty/stenting, paroxysmal atrial fibrillation, HTN, dyslipidemia who was admitted today with chest discomfort. He had an abnormal stress test in March of 2017 which showed a evidence of a prior MI in the RCA/circumflex distribution with mild ezekiel-infarct ischemia. Continued medical management was recommended. He has followed with Dr. Samuel in clinic and was last seen in December.     He was recently admitted on 5/26/18 with SIRS syndrome and chest discomfort. He was hypotensive and hypoxic on admission. Infectious work up was negative. Troponin levels were elevated up to 2.3. EKG showed some new ST depression in the lateral leads. Echocardiogram showed a stable LVEF of 35 - 40% with moderate to severe inferolateral wall hypokinesis and moderate inferior wall hypokinesis. Cardiology was consulted. His troponin elevation was felt to likely represent demand ischemia in the setting of sepsis. As he was asymptomatic without chest discomfort, family wanted to pursue more conservative management and hold off on an angiogram.    On 5/29, he developed recurrent chest discomfort associated with recurrent anterolateral ST depression and inferior T wave changes. His pain resolved with nitroglycerin. Troponin increased from 0.5 to 0.7. After discussion, he agreed to coronary angiography.     This was done on 5/30. He had severe disease in the proximal LAD and LCx with 90% stenosis. The RCA is occluded with large left to right collaterals. A very small distal OM branch was occluded with collaterals. He underwent stenting of the LAD and LCx with good results. Mild to moderate  diffuse disease remains.     He did well with cardiac rehab without recurrent symptoms. He did have a short run of NSVT during cardiac rehab which was asymptomatic. He was discharged home yesterday, 5/31, on metoprolol, imdur, lisinopril, aspirin and plavix.     History is obtained from the patient and his wife. He is a fair historian at best. Last night, he told his wife around 9 pm that he had developed some chest discomfort. He was able to go to sleep last night but his pain persisted when he woke up this morning. When I ask where his pain is he points to his epigastric region. The pain persisted for several hours this morning but has now resolved. It did not improve much with nitroglycerin. He tells me that it is completely different from the pain he was experiencing prior to his stents although when I ask him to explain this pain he can give me no details about it.     In addition to his pain he perhaps noted some mild shortness of breath when walking to the bathroom this morning. He also thinks he was a little short of breath last night when lying in bed. He had no SOB with cardiac rehab yesterday. No nausea, vomiting, PND, or peripheral edema.     Past Medical History    Past Medical History:   Diagnosis Date     Aortic root dilatation (H)      Aortic stenosis      Benign essential hypertension 1/6/2017     Benign non-nodular prostatic hyperplasia with lower urinary tract symptoms 10/20/2016     CAD (coronary artery disease)     MI 1970     Cardiomyopathy (H)      Carotid artery stenosis 10/20/2016    chronically occluded left internal carotid artery     Carotid occlusion, left      Coronary artery disease involving native coronary artery of native heart without angina pectoris 10/20/2016     Diabetes mellitus (H)      DJD (degenerative joint disease)      Gastro-oesophageal reflux disease      Gastroesophageal reflux disease without esophagitis 10/20/2016     Heart attack      Hyperlipidemia       Hypertension      Mild cognitive impairment 10/20/2016     Nephrolithiasis     RECURRENT     Osteopenia      PAD (peripheral artery disease) (H)     peripheral angiogram 5/2017: The common iliac artery stenoses were stented and the superficial femoral artery stenoses were angioplastied     Paroxysmal atrial fibrillation (H)      PONV (postoperative nausea and vomiting)      RBBB with left anterior fascicular block      Unspecified cerebral artery occlusion with cerebral infarction        Past Surgical History   Past Surgical History:   Procedure Laterality Date     AMPUTATE FOOT Left 6/4/2017    Procedure: AMPUTATE FOOT;  Sun Add#3: partial left foot amputation - Sagital Saw - Mini C-Arm;  Surgeon: Moe Kirk DPM;  Location:  OR     CHOLECYSTECTOMY       ENDARTERECTOMY CAROTID Right 1/3/2018    Procedure: ENDARTERECTOMY CAROTID;  RIGHT CAROTID ENDARTERECTOMY WITH EEG;  Surgeon: Roland Rodriguez MD;  Location:  OR     ESOPHAGOSCOPY, GASTROSCOPY, DUODENOSCOPY (EGD), COMBINED N/A 12/26/2016    Procedure: COMBINED ESOPHAGOSCOPY, GASTROSCOPY, DUODENOSCOPY (EGD);  Surgeon: Nasim Louis MD;  Location:  GI     GENITOURINARY SURGERY      KIDNEY STONE REMOVAL X 4     HC UGI ENDOSCOPY W EUS N/A 12/29/2016    Procedure: COMBINED ENDOSCOPIC ULTRASOUND, ESOPHAGOSCOPY, GASTROSCOPY, DUODENOSCOPY (EGD);  Surgeon: Nasim Louis MD;  Location:  GI     HERNIA REPAIR       INCISION AND DRAINAGE FOOT, COMBINED Left 6/6/2017    Procedure: COMBINED INCISION AND DRAINAGE FOOT;  REVISIONAL LEFT FOOT INCISION AND DRAINAGE WITH POSSIBLE FLAP CLOSURE , ANTIBIOTIC SOLUTION ;  Surgeon: Nabil Ann DPM;  Location:  OR     LASER HOLMIUM LITHOTRIPSY URETER(S), INSERT STENT, COMBINED  3/2/2012    Procedure:COMBINED CYSTOSCOPY, URETEROSCOPY, LASER HOLMIUM LITHOTRIPSY URETER(S), INSERT STENT; CYSTOSCOPY, RIGHT RETROGRADES, RIGHT URETEROSCOPY, HOLMIUM LASER, RIGHT STENT PLACEMENT; Surgeon:ROMELIA  ANJELICA STEWART; Location:[facility] OR     ROTATOR CUFF REPAIR RT/LT         Prior to Admission Medications   Prior to Admission Medications   Prescriptions Last Dose Informant Patient Reported? Taking?   AMITRIPTYLINE HCL PO Past Month at more than 2 weeks ago Spouse/Significant Other Yes Yes   Sig: Take 25 mg by mouth nightly as needed for sleep   ATORVASTATIN CALCIUM PO 5/31/2018 at pm Spouse/Significant Other Yes Yes   Sig: Take 40 mg by mouth At Bedtime    Cholecalciferol 89457 UNITS TABS 5/31/2018 at Unknown time Spouse/Significant Other Yes Yes   Sig: Take 1 tablet by mouth three times a week (Mon, Wed, Fri)   Clopidogrel Bisulfate, [id number], (PLAVIX PO) 5/31/2018 at Unknown time Spouse/Significant Other Yes Yes   Sig: Take 75 mg by mouth daily    DULoxetine (CYMBALTA) 30 MG EC capsule 5/31/2018 at Unknown time Spouse/Significant Other No Yes   Sig: Take 1 capsule (30 mg) by mouth daily   METFORMIN HCL PO 5/31/2018 at pm Spouse/Significant Other Yes Yes   Sig: Take 1,000 mg by mouth 2 times daily (with meals)   OMEPRAZOLE PO 5/31/2018 at pm Spouse/Significant Other Yes Yes   Sig: Take 40 mg by mouth 2 times daily    TRUE METRIX BLOOD GLUCOSE TEST test strip  Spouse/Significant Other No No   Sig: USE TO TEST BLOOD SUGAR THREE TIMES DAILY OR AS DIRECTED   aspirin EC 81 MG EC tablet 5/31/2018 at Unknown time Spouse/Significant Other No Yes   Sig: Take 1 tablet (81 mg) by mouth daily   bisacodyl (DULCOLAX) 10 MG Suppository prn med taken more than a month ago Spouse/Significant Other Yes Yes   Sig: Place 10 mg rectally daily as needed for constipation   glipiZIDE (GLUCOTROL) 10 MG tablet 5/31/2018 at Unknown time Spouse/Significant Other Yes Yes   Sig: Take 1 tablet (10 mg) by mouth 2 times daily (before meals)   ipratropium (ATROVENT) 0.03 % spray prn med Spouse/Significant Other No Yes   Sig: INHALE 1 SPRAY IN EACH NOSTRIL EVERY 12 HOURS AS NEEDED   isosorbide mononitrate (IMDUR) 30 MG 24 hr tablet 5/31/2018 at am  "Spouse/Significant Other No Yes   Sig: Take 1 tablet (30 mg) by mouth daily   lisinopril (PRINIVIL/ZESTRIL) 5 MG tablet 5/31/2018 at am Spouse/Significant Other No Yes   Sig: Take 1 tablet (5 mg) by mouth daily   magnesium oxide (MAG-OX) 400 (241.3 Mg) MG tablet 5/31/2018 at Unknown time Spouse/Significant Other No Yes   Sig: TAKE 1 TABLET BY MOUTH TWICE DAILY   metoprolol tartrate (LOPRESSOR) 50 MG tablet 5/31/2018 at am Spouse/Significant Other No Yes   Sig: Take 1 tablet (50 mg) by mouth 2 times daily   nitroGLYcerin (NITROSTAT) 0.4 MG sublingual tablet  Spouse/Significant Other No Yes   Sig: For chest pain place 1 tablet under the tongue every 5 minutes for 3 doses. If symptoms persist 5 minutes after 1st dose call 911.   order for DME  Spouse/Significant Other No No   Sig: Equipment being ordered: [employer] Blood Glucose meter.   polyethylene glycol (MIRALAX/GLYCOLAX) Packet 5/31/2018 Spouse/Significant Other Yes Yes   Sig: Take 17 g by mouth daily as needed for constipation   tamsulosin (FLOMAX) 0.4 MG capsule 5/31/2018 at Unknown time Spouse/Significant Other No Yes   Sig: Take 1 capsule (0.4 mg) by mouth daily      Facility-Administered Medications: None     Allergies   Allergies   Allergen Reactions     Oxycodone Other (See Comments)     \"TERRIBLE SWEATING\"     Sulfa Drugs      Tetracycline        Social History   . Former smoker, quit in 1999. 1 alcoholic beverage per week. No drug use.    Family History   Family History   Problem Relation Age of Onset     Breast Cancer Mother      Heart Failure Father 81       Review of Systems   The 10 point Review of Systems is negative other than noted in the HPI or here.    Physical Exam   Temp: 97.8  F (36.6  C) Temp src: Oral BP: 136/81 Pulse: 75 Heart Rate: 75 Resp: 16 SpO2: 96 % O2 Device: None (Room air)    Vital Signs with Ranges  Temp:  [97.8  F (36.6  C)-99.1  F (37.3  C)] 97.8  F (36.6  C)  Pulse:  [75] 75  Heart Rate:  [67-76] 75  Resp:  [11-20] " 16  BP: (136-177)/(65-83) 136/81  SpO2:  [94 %-96 %] 96 %  160 lbs 3.2 oz    Constitutional: Alert, no acute distress.  Eyes: sclera anicteric  Respiratory: No respiratory distress. Breath sounds are CTAB. No wheezes, rhonchi, or rales.  Cardiovascular: Regular rate and rhythm. Normal S1, S2. No murmurs, rubs, or gallops.  GI: abdomen soft.  Skin: warm and dry.   Musculoskeletal: No LE edema bilaterally. Viktoria  Neurologic: alert and oriented x 3.  Psychiatric: affect appropriate.     Data   -Data reviewed today: All pertinent laboratory and imaging results from this encounter were reviewed. I personally reviewed the EKG tracing showing NSR.    Recent Labs  Lab 06/01/18  1015 06/01/18  0810 05/31/18  0600 05/30/18  0540 05/29/18  0635  05/27/18  0710  05/27/18  0119   WBC  --  7.5 8.5 9.1 7.5  < > 7.6  --   --    HGB  --  10.5* 10.5* 11.0* 11.1*  < > 10.7*  --   --    MCV  --  91 91 90 92  < > 92  --   --    PLT  --  155 148* 166 165  < > 144*  --   --    NA  --  142 139 138 140  < > 142  --   --    POTASSIUM  --  4.1 4.3 4.2 4.9  < > 4.8  --   --    CHLORIDE  --  106 105 104 106  < > 112*  --   --    CO2  --  29 30 25 28  < > 25  --   --    BUN  --  15 10 12 8  < > 20  --   --    CR  --  0.70 0.81 0.68 0.82  < > 0.84  --   --    ANIONGAP  --  7 4 9 6  < > 5  --   --    ALLISON  --  9.1 8.6 9.0 8.8  < > 8.0*  --   --    GLC  --  224* 200* 211* 187*  < > 195*  --   --    ALBUMIN  --  2.9*  --   --   --   --  2.9*  --   --    PROTTOTAL  --  6.6*  --   --   --   --  5.8*  --   --    BILITOTAL  --  0.6  --   --   --   --  0.3  --   --    ALKPHOS  --  67  --   --   --   --  60  --   --    ALT  --  14  --   --   --   --  15  --   --    AST  --  15  --   --   --   --  18  --   --    LIPASE  --  104  --   --   --   --   --   --   --    TROPI 0.133* 0.130*  --   --  0.651*  < > 0.898*  < >  --    TROPONIN  --   --   --   --   --   --   --   --  0.00   < > = values in this interval not displayed.    Telemetry  NSR    Imaging:  Recent Results (from the past 24 hour(s))   XR Chest 2 Views    Narrative    XR CHEST 2 VW 6/1/2018 8:22 AM    HISTORY: Pain.    COMPARISON: May 26, 2018.      Impression    IMPRESSION: Stable subtle opacification of the left lateral lung base,  likely atelectasis or scarring as before. No new focal pulmonary  opacities otherwise. No pleural effusions or pneumothorax. Stable  heart and mediastinum.    GALDINO LANDERS MD

## 2018-06-01 NOTE — IP AVS SNAPSHOT
"` [facility] CARDIAC SPECIALTY CARE: [phone]                                              INTERAGENCY TRANSFER FORM - NURSING   2018                    Hospital Admission Date: 2018  SID AGUIAR   : 1928  Sex: Male        Attending Provider: Bronson Whitaker MD     Allergies:  Oxycodone, Sulfa Drugs, Tetracycline    Infection:  None   Service:  HOSPITALIST    Ht:  1.651 m (5' 5\")   Wt:  71.8 kg (158 lb 6.4 oz)   Admission Wt:  72.6 kg (160 lb)    BMI:  26.36 kg/m 2   BSA:  1.82 m 2            Patient PCP Information     Provider PCP Type    Matt Morrissey MD General    Matt Morrissey MD Internal Medicine      Current Code Status     Date Active Code Status Order ID Comments User Context       Prior      Code Status History     Date Active Date Inactive Code Status Order ID Comments User Context    6/3/2018 11:36 AM  Full Code [id number]  Pat Garcia MD Outpatient    2018  2:20 PM 6/3/2018 11:36 AM Full Code [id number]  Bronson Whitaker MD Inpatient    2018 11:30 AM 2018  2:20 PM Full Code [id number]  Brandyn Graves PA Outpatient    2018  3:14 AM 2018 11:30 AM Full Code [id number]  Amilcar Sharma MD Inpatient    2018 12:16 PM 2018  3:14 AM Full Code [id number]  Matt Purcell MD Outpatient    2018  5:21 AM 2018 12:16 PM Full Code [id number]  Blaze Martinez MD Inpatient    2018  3:04 PM 2018  5:21 AM Full Code [id number]  Gagan Jerome MD Outpatient    2017  1:57 PM 2018  3:04 PM Full Code [id number]  Meseret Tidwell MD Inpatient    2017 10:07 AM 2017  1:57 PM Full Code [id number]  Faustino Aguilar MD Outpatient    2017  8:56 PM 2017 10:07 AM Full Code [id number]  Blaze Torres MD Inpatient    2017  3:57 PM 2017  8:56 PM Full Code [id number]  Tra Reynoso MD Outpatient    2017  1:55 PM 2017  3:57 PM Full " Code [id number]  Blaze Rose MD Inpatient    12/28/2016  1:26 PM 1/20/2017  1:55 PM Full Code [id number]  Plaoma Chen MD Outpatient    12/25/2016  3:13 AM 12/28/2016  1:26 PM Full Code [id number]  Faustino Aguilar MD Inpatient    3/2/2012 11:45 AM 12/25/2016  3:13 AM Full Code [id number]  Wilmar Medrano MD Outpatient      Advance Directives        Scanned docmt in ACP Activity?           No scanned doc        Hospital Problems as of 6/3/2018              Priority Class Noted POA    Chest discomfort Medium  6/1/2018 Unknown      Non-Hospital Problems as of 6/3/2018              Priority Class Noted    Coronary artery disease involving native coronary artery of native heart without angina pectoris Medium  10/20/2016    Mild cognitive impairment Medium  10/20/2016    Hyperlipidemia, unspecified hyperlipidemia type Medium  10/20/2016    Benign non-nodular prostatic hyperplasia with lower urinary tract symptoms Medium  10/20/2016    Gastroesophageal reflux disease without esophagitis Medium  10/20/2016    Cerebrovascular accident (H) Medium  10/20/2016    Carotid artery stenosis Medium  10/20/2016    Abdominal aortic aneurysm (H) Medium  12/25/2016    Lumbar back pain Medium  12/30/2016    Benign essential hypertension Medium  1/6/2017    TIA (transient ischemic attack) Medium  1/20/2017    Paroxysmal atrial fibrillation (H) Medium  Unknown    Ischemic cardiomyopathy Medium  Unknown    Aortic root dilatation (H) Medium  Unknown    Physical deconditioning Medium  6/12/2017    Diabetic ulcer of left foot associated with diabetes mellitus due to underlying condition (H) Medium  6/12/2017    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) Medium  6/12/2017    Anemia due to blood loss, acute Medium  6/13/2017    Diarrhea, unspecified type Medium  6/16/2017    Nausea and vomiting, intractability of vomiting not specified, unspecified vomiting type Medium  6/21/2017    Acute pain of left shoulder Medium   8/7/2017    Balance problems Medium  8/7/2017    Generalized muscle weakness Medium  8/7/2017    Peripheral artery disease (H) Medium  Unknown    Aortic stenosis Medium  Unknown    RBBB with left anterior fascicular block Medium  Unknown    Stroke of unknown etiology (H) Medium  12/31/2017    Carotid stenosis Medium  1/3/2018    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) Medium  1/5/2018    History of TIA (transient ischemic attack) and stroke Medium  1/5/2018    UTI (urinary tract infection) Medium  2/8/2018    UTI (urinary tract infection) due to Enterococcus Medium  2/22/2018    Generalized weakness Medium  2/22/2018    Type 2 diabetes mellitus with hyperglycemia, unspecified long term insulin use status (H) Medium  2/22/2018    Atherosclerosis of coronary artery of native heart without angina pectoris, unspecified vessel or lesion type Medium  2/22/2018    Depression, unspecified depression type Medium  2/22/2018    Slow transit constipation Medium  2/22/2018    Severe sepsis (H) Medium  5/27/2018      Immunizations     Name Date      Influenza (High Dose) 3 valent vaccine 11/03/17     Influenza (High Dose) 3 valent vaccine 10/16/16     Pneumo Conj 13-V (2010&after) 12/05/14     Pneumococcal 23 valent 01/01/00     TD (ADULT, 7+) 11/01/10     Zoster vaccine, live 01/01/12          END      ASSESSMENT     Discharge Profile Flowsheet     EXPECTED DISCHARGE     COMMUNICATION ASSESSMENT      Expected Discharge Date  06/02/18 06/01/18 1543   Patient's communication style  spoken language (English or Bilingual) 06/01/18 1440    DISCHARGE NEEDS ASSESSMENT     Use of hearing assistive devices  bilateral hearing aids (left at home) 06/01/18 1440    Concerns To Be Addressed  -- (NA) 07/14/17 1612   FINAL RESOURCES      Concerns Comments  -- (NA) 07/14/17 1612   Resources List  Transitional Care 02/27/18 1627    Anticipated Changes Related to Illness  none 06/01/18 1446   Other Resources  -- (NA)  "02/27/18 1627    Equipment Currently Used at Home  cane, straight;grab bar 06/02/18 1500   PAS Number  -- (n/a) 02/27/18 1627    Transportation Available  family or friend will provide 06/02/18 1500   SKIN      # of Referrals Placed by CTS  Communication hand-offs to next level of Care Providers 06/03/18 1154   Inspection of bony prominences  Full 06/02/18 1933    Does Patient Need a Referral for Clinic CC  Yes 12/29/16 1100   Inspection under devices  Full 06/02/18 1933    New Steerage to  Clinics?  No 06/03/18 1154   Skin WDL  ex 06/02/18 1933    Primary Care Clinic Name   [facility]  06/03/18 1154   Skin Color/Characteristics  bruised (ecchymotic) 06/02/18 1933    Primary Care MD Name  Yuni  06/03/18 1154   Skin Temperature  warm 06/02/18 1933    GASTROINTESTINAL (ADULT,PEDIATRIC,OB)     Skin Moisture  dry 06/02/18 1933    GI WDL  WDL 06/02/18 1933   Skin Integrity  scab(s);bruise(s) (left wrist) 06/02/18 1933    Abdominal Appearance  rounded 06/02/18 1203   SAFETY      All Quadrants Bowel Sounds  hypoactive 06/02/18 1933   Safety WDL  WDL 06/02/18 2326    Last Bowel Movement  06/01/18 06/02/18 1315   Safety Factors  ID band on;upper side rails raised x 2;call light in reach;wheels locked;bed in low position 06/02/18 2326    GI Signs/Symptoms  constipation (prune juice given per pt request) 06/01/18 2308   All Alarms  alarm(s) activated and audible 06/02/18 2326    Passing flatus  yes 06/02/18 0347                      Assessment WDL (Within Defined Limits) Definitions           Safety WDL     Effective: 09/28/15    Row Information: <b>WDL Definition:</b> Bed in low position, wheels locked; call light in reach; upper side rails up x 2; ID band on<br> <font color=\"gray\"><i>Item=AS safety wdl>>List=AS safety wdl>>Version=F14</i></font>      Skin WDL     Effective: 09/28/15    Row Information: <b>WDL Definition:</b> Warm; dry; intact; elastic; without discoloration; pressure points without redness<br> <font " "color=\"gray\"><i>Item=AS skin wdl>>List=AS skin wdl>>Version=F14</i></font>      Vitals     Vital Signs Flowsheet     VITAL SIGNS     Femoral Bruit  not present 05/31/18 0001    Temp  98.1  F (36.7  C) 06/03/18 1135   CMS Right Extremity  WDL 05/31/18 0659    Temp src  Oral 06/03/18 1135   Dorsalis Pulse - Right Leg  Normal 05/31/18 0456    Resp  18 06/03/18 1135   Popliteal Pulse - Right Leg  Normal 05/1935    Pulse  60 06/02/18 0909   Posterior Tibial Pulse - Right Leg  Normal 05/31/18 0456    Heart Rate  70 06/03/18 1135   [REMOVED] LEFT RADIAL INTERVENTIONAL PROCEDURE ACCESS      Pulse/Heart Rate Source  Monitor 06/03/18 1135   Site Assessment  WDL 06/01/18 1435    BP  (!)  93/39 06/03/18 1135   Hemostasis management  Unchanged 06/01/18 1435    BP Location  Left arm 06/03/18 1135   Radial device volume remaining  0 mL 05/30/18 1716    Patient Position  Lying 05/29/18 1551   CMS Left Arm  WDL 05/31/18 0659    OXYGEN THERAPY     Radial Pulse - Left Arm  Normal 05/31/18 0456    SpO2  92 % 06/03/18 1135   POSITIONING      O2 Device  None (Room air) 06/03/18 1135   Body Position  independently positioning 06/03/18 1154    Oxygen Delivery  3 LPM 06/02/18 0909   Head of Bed (HOB)  HOB at 60 degrees 06/03/18 1154    PAIN/COMFORT     Positioning/Transfer Devices  pillows;in use 06/03/18 1154    Patient Currently in Pain  denies 06/02/18 2320   Chair  Upright in chair 06/03/18 1154    Preferred Pain Scale  number (Numeric Rating Pain Scale) 06/01/18 1435   DAILY CARE      Patient's Stated Pain Goal  No pain 06/01/18 1435   Activity Management  ambulated in room;ambulated to bathroom;up in chair 06/03/18 1154    0-10 Pain Scale  2 06/02/18 1515   Activity Assistance Provided  assistance, stand-by 06/03/18 1154    Pain Location  Chest (Upper chest to throat area) 06/02/18 0822   Assistive Device Utilized  cane 06/03/18 1154    Pain Orientation  Mid;Lower 06/02/18 0050   Additional Documentation  Activity Device " "Assistance (Row) 05/27/18 0426    Pain Descriptors  Aching 06/02/18 0822   ECG      Pain Intervention(s)  Medication (See eMAR) 06/02/18 0822   ECG Rhythm  Sinus rhythm 06/02/18 0321    Response to Interventions  Relief 06/02/18 0909   Ectopy  PAC 06/02/18 0321    ANALGESIA SIDE EFFECTS MONITORING     Ectopy Frequency  Frequent 06/02/18 0321    Side Effects Monitoring: Respiratory Quality  R 06/02/18 0822   SOO COMA SCALE      Side Effects Monitoring: Respiratory Depth  N 06/02/18 0822   Best Eye Response  4-->(E4) spontaneous 06/02/18 2320    Side Effects Monitoring: Sedation Level  1 06/02/18 0822   Best Motor Response  6-->(M6) obeys commands 06/02/18 2320    HEIGHT AND WEIGHT     Best Verbal Response  5-->(V5) oriented 06/02/18 2320    Height  1.651 m (5' 5\") 06/01/18 1419   [place] Coma Scale Score  15 06/02/18 2320    Height Method  Stated 06/01/18 0804   Assessment Qualifiers  patient not sedated/intubated 05/29/18 1804    Weight  71.8 kg (158 lb 6.4 oz) 06/03/18 0205   RESPIRATORY MONITORING      Weight Method  Standing scale 06/03/18 0205   Respiratory Monitoring (EtCO2)  0 mmHg 05/26/18 2136    BSA (Calculated - sq m)  1.83 06/01/18 1419   EKG MONITORING      BMI (Calculated)  26.71 06/01/18 1419   Cardiac Regularity  Irregular 05/26/18 2043    [REMOVED] RIGHT GROIN INTERVENTIONAL PROCEDURE ACCESS     Cardiac Rhythm  Atrial fibrillation 05/26/18 2043    Site Assessment  Ecchymotic (tegaderrm removed) 06/01/18 1435   COMMENTS      Hemostasis management  Unchanged 06/01/18 1435   Comments  activity with CR 05/31/18 1508            Patient Lines/Drains/Airways Status    Active LINES/DRAINS/AIRWAYS     Name: Placement date: Placement time: Site: Days: Last dressing change:    Peripheral IV 06/01/18 Right Upper arm 06/01/18   0810   Upper arm   2             Patient Lines/Drains/Airways Status    Active PICC/CVC     None            Intake/Output Detail Report     Date Intake     Output Net    Shift P.O. " I.V. IV Piggyback Total Urine Total       Noc 06/01/18 2300 - 06/02/18 0659 50 -- -- 50 100 100 -50    Day 06/02/18 0700 - 06/02/18 1459 220 -- -- 220 250 250 -30    Charisse 06/02/18 1500 - 06/02/18 2259 50 -- -- 50 -- -- 50    Noc 06/02/18 2300 - 06/03/18 0659 0 -- -- -- 350 350 -350    Day 06/03/18 0700 - 06/03/18 1459 220 -- -- 220 -- -- 220      Last Void/BM       Most Recent Value    Urine Occurrence 1 at 06/02/2018 0500    Stool Occurrence 1 at 06/01/2018 2301      Case Management/Discharge Planning     Case Management/Discharge Planning Flowsheet     REFERRAL INFORMATION     HOMICIDE RISK      Did the Initial Social Work Assessment result in a Social Work Case?  Yes 06/03/18 1050   Feels Like Hurting Others  no 06/01/18 0805    Admission Type  observation 06/03/18 1154   COPING/STRESS      Arrived From  home or self-care 06/03/18 1154   Major Change/Loss/Stressor  hospitalization 06/01/18 1840    Referral Source  interdisciplinary rounds 06/03/18 1154   Patient Personal Strengths  able to adapt 06/06/17 1517    New Steerage to  Clinics?  No 06/03/18 1154   EXPECTED DISCHARGE      # of Referrals Placed by CTS  Communication hand-offs to next level of Care Providers 06/03/18 1154   Expected Discharge Date  06/02/18 06/01/18 1543    Post Acute Facilities  TCU 06/03/18 1109   ASSESSMENT/CONCERNS TO BE ADDRESSED      Reason For Consult  discharge planning 06/03/18 1154   Concerns To Be Addressed  -- (NA) 07/14/17 1612    Record Reviewed  plan of care 06/03/18 1154   Concerns Comments  -- (NA) 07/14/17 1612    CTS Assigned to Case  melo quintanillamary cc 06/03/18 1154   DISCHARGE PLANNING      Primary Care Clinic Name  [facility]  06/03/18 1154   Anticipated Changes Related to Illness  none 06/01/18 1446    Primary Care MD Name  Yuni  06/03/18 1154   Transportation Available  family or friend will provide 06/02/18 1500    LIVING ENVIRONMENT     Does Patient Need a Referral for Clinic CC  Yes 12/29/16 1100    Lives With   spouse 06/03/18 1109   FINAL RESOURCES      Living Arrangements  house 06/03/18 1108   Equipment Currently Used at Home  cane, straight;grab bar 06/02/18 1500    Provides Primary Care For  no one 06/03/18 1108   Resources List  Transitional Care 02/27/18 1627    Quality Of Family Relationships  supportive;involved 06/03/18 1108   Other Resources  -- (NA) 02/27/18 1627    ASSESSMENT OF FAMILY/SOCIAL SUPPORT     PAS Number  -- (n/a) 02/27/18 1627    Marital Status   06/03/18 1109   ABUSE RISK SCREEN      Who is your support system?  Wife 06/03/18 1108   QUESTION TO PATIENT:  Has a member of your family or a partner(now or in the past) intimidated, hurt, manipulated, or controlled you in any way?  no 06/01/18 1446    Description of Support System  Supportive;Involved 06/03/18 1109   QUESTION TO PATIENT: Do you feel safe going back to the place where you are living?  yes 06/01/18 1446    Support Assessment  Adequate family and caregiver support 06/03/18 1109   OBSERVATION: Is there reason to believe there has been maltreatment of a vulnerable adult (ie. Physical/Sexual/Emotional abuse, self neglect, lack of adequate food, shelter, medical care, or financial exploitation)?  no 06/01/18 1446    EMPLOYMENT     OTHER      Do you work full or part-time?  no 06/03/18 1109   Are you depressed or being treated for depression?  No 06/01/18 1830

## 2018-06-01 NOTE — ED NOTES
"[facility]  ED Nurse Handoff Report    ED Chief complaint: Chest Pain (wednesday had angiogram with stents 8pm last night started having pressure)      ED Diagnosis:   Final diagnoses:   Acute chest pain       Code Status: Full Code    Allergies:   Allergies   Allergen Reactions     Oxycodone Other (See Comments)     \"TERRIBLE SWEATING\"     Sulfa Drugs      Tetracycline        Activity level - Baseline/Home:  Stand with Assist uses a cane    Activity Level - Current:   Stand with Assist     Needed?: No    Isolation: No  Infection: Not Applicable    Bariatric?: No    Vital Signs:   Vitals:    06/01/18 0900 06/01/18 0915 06/01/18 0930 06/01/18 0945   BP: 156/69 160/68 141/83 162/79   Resp:  18 20 11   Temp:       TempSrc:       SpO2:       Weight:       Height:           Cardiac Rhythm: ,        Pain level: 0-10 Pain Scale: 2    Is this patient confused?: No   Suicidality: Screened negative    Patient Report: Initial Complaint: chest pressure.  Focused Assessment: pt states chest pressure during the night. Pt discharged yesterday after NSTEMI with 2 stents placed. Pressure is intermittent. Pt did not try any nitro at home.patient alert and oriented. Uses a cane at home.Tests Performed: blood.cxr  Abnormal Results: troponin 0.130. Repeat troponin 0.133  Treatments provided: pt had aspirin and nitro sl x 1. Pts pain is intermittent  Family Comments: wife here    OBS brochure/video discussed/provided to patient: Yes    ED Medications:   Medications   nitroGLYcerin (NITROSTAT) sublingual tablet 0.4 mg (0.4 mg Sublingual Given 6/1/18 0829)   aspirin chewable tablet 324 mg (324 mg Oral Given 6/1/18 0828)       Drips infusing?:  No    For the majority of the shift this patient was Green.   Interventions performed were 0.    Severe Sepsis OR Septic Shock Diagnosis Present: No      ED NURSE PHONE NUMBER: [phone]       "

## 2018-06-01 NOTE — IP AVS SNAPSHOT
` `     [facility] CARDIAC SPECIALTY CARE: [phone]            Medication Administration Report for Dustin Pearce as of 06/03/18 1200   Legend:    Given Hold Not Given Due Canceled Entry Other Actions    Time Time (Time) Time  Time-Action       Inactive    Active    Linked        Medications 05/28/18 05/29/18 05/30/18 05/31/18 06/01/18 06/02/18 06/03/18    acetaminophen (TYLENOL) Suppository 650 mg  Dose: 650 mg  Freq: EVERY 4 HOURS PRN Route: RE  PRN Reason: mild pain  Start: 06/01/18 1420   Admin Instructions: Alternate ibuprofen (if ordered) with acetaminophen.  Maximum acetaminophen dose from all sources = 75 mg/kg/day not to exceed 4 grams/day.    Admin. Amount: 1 suppository (1 × 650 mg suppository)  Dispense Loc: [facility] A               acetaminophen (TYLENOL) tablet 650 mg  Dose: 650 mg  Freq: EVERY 4 HOURS PRN Route: PO  PRN Reason: mild pain  Start: 06/01/18 1420   Admin Instructions: Alternate ibuprofen (if ordered) with acetaminophen  Maximum acetaminophen dose from all sources = 75 mg/kg/day not to exceed 4 grams/day.    Admin. Amount: 2 tablet (2 × 325 mg tablet)  Last Admin: 06/02/18 0051  Dispense Loc: [facility] A          0051 (650 mg)-Given            alum & mag hydroxide-simethicone (MYLANTA ES/MAALOX  ES) suspension 30 mL  Dose: 30 mL  Freq: EVERY 4 HOURS PRN Route: PO  PRN Reasons: indigestion,heartburn  Start: 06/01/18 1420   Admin Instructions: Shake well.    Admin. Amount: 30 mL  Dispense Loc: [facility] A  Volume: 30 mL               amitriptyline (ELAVIL) tablet 25 mg  Dose: 25 mg  Freq: AT BEDTIME PRN Route: PO  PRN Reason: sleep  Start: 06/01/18 1747   Admin. Amount: 1 tablet (1 × 25 mg tablet)  Dispense Loc: [facility] A               aspirin EC tablet 81 mg  Dose: 81 mg  Freq: DAILY Route: PO  Start: 06/02/18 0900   Admin Instructions: DO NOT CRUSH.    Admin. Amount: 1 tablet (1 × 81 mg tablet)  Last Admin: 06/03/18 0951  Dispense Loc: [facility] A          1036 (90  mg)-Given        0951 (81 mg)-Given           atorvastatin (LIPITOR) tablet 40 mg  Dose: 40 mg  Freq: AT BEDTIME Route: PO  Start: 06/01/18 2200   Admin. Amount: 1 tablet (1 × 40 mg tablet)  Last Admin: 06/02/18 2123  Dispense Loc: [facility] A         2041 (40 mg)-Given               2123 (40 mg)-Given        [ ] 2200           clopidogrel (PLAVIX) tablet 75 mg  Dose: 75 mg  Freq: DAILY Route: PO  Start: 06/01/18 1430   Admin. Amount: 1 tablet (1 × 75 mg tablet)  Last Admin: 06/03/18 0951  Dispense Loc: [facility] A         1759 (75 mg)-Given        1037 (75 mg)-Given        0951 (75 mg)-Given           DULoxetine (CYMBALTA) EC capsule 30 mg  Dose: 30 mg  Freq: DAILY Route: PO  Start: 06/02/18 0900   Admin. Amount: 1 capsule (1 × 30 mg capsule)  Last Admin: 06/03/18 0951  Dispense Loc: [facility] A          1036 (30 mg)-Given        0951 (30 mg)-Given           ferrous gluconate (FERGON) tablet 324 mg  Dose: 324 mg  Freq: DAILY WITH BREAKFAST Route: PO  Start: 06/03/18 1115   Admin Instructions: DO NOT CRUSH. Absorbed best on an empty stomach. If stomach upset occurs, can take with meals.    Admin. Amount: 1 tablet (1 × 324 mg tablet)  Dispense Loc: [facility] A           [ ] 1115           furosemide (LASIX) tablet 20 mg  Dose: 20 mg  Freq: 2 TIMES DAILY. Route: PO  Start: 06/02/18 1600   Admin. Amount: 1 tablet (1 × 20 mg tablet)  Last Admin: 06/03/18 0951  Dispense Loc: [facility] A          1627 (20 mg)-Given        0951 (20 mg)-Given       [ ] 1600           glipiZIDE (GLUCOTROL) tablet 10 mg  Dose: 10 mg  Freq: 2 TIMES DAILY BEFORE MEALS Route: PO  Start: 06/01/18 1800   Admin Instructions: Take before or with meals.    Admin. Amount: 2 tablet (2 × 5 mg tablet)  Last Admin: 06/03/18 0951  Dispense Loc: [facility] A         1906 (10 mg)-Given        1036 (10 mg)-Given       1627 (10 mg)-Given        0951 (10 mg)-Given       [ ] 1630           glucose gel 15-30 g  Dose: 15-30 g  Freq: EVERY 15 MIN PRN Route:  PO  PRN Reason: low blood sugar  Start: 06/01/18 1420   Admin Instructions: Give 15 g for BG 51 to 69 mg/dL IF patient is conscious and able to swallow. Give 30 g for BG less than or equal to 50 mg/dL IF patient is conscious and able to swallow. Do NOT give glucose gel via enteral tube.  IF patient has enteral tube: give apple juice 120 mL (4 oz or 15 g of CHO) via enteral tube for BG 51 to 69 mg/dL.  Give apple juice 240 mL (8 oz or 30 g of CHO) via enteral tube for BG less than or equal to 50 mg/dL.    ~Oral gel is preferable for conscious and able to swallow patient.   ~IF gel unavailable or patient refuses may provide apple juice 120 mL (4 oz or 15 g of CHO). Document juice on I and O flowsheet.    Admin. Amount: 15-30 g  Dispense Loc:  [facility] A  Volume: 93.75 mL              Or  dextrose 50 % injection 25-50 mL  Dose: 25-50 mL  Freq: EVERY 15 MIN PRN Route: IV  PRN Reason: low blood sugar  Start: 06/01/18 1420   Admin Instructions: Use if have IV access, BG less than 70 mg/dL and meet dose criteria below:  Dose if conscious and alert (or disorientated) and NPO = 25 mL  Dose if unconscious / not alert = 50 mL  Vesicant. For ordered doses up to 25 g, give IV Push undiluted. Give each 5g over 1 minute.    Admin. Amount: 25-50 mL  Dispense Loc:  [facility]  Infused Over: 1-5 Minutes  Volume: 50 mL              Or  glucagon injection 1 mg  Dose: 1 mg  Freq: EVERY 15 MIN PRN Route: SC  PRN Reason: low blood sugar  PRN Comment: May repeat x 1 only  Start: 06/01/18 1420   Admin Instructions: May give SQ or IM. ONLY use glucagon IF patient has NO IV access AND is UNABLE to swallow AND blood glucose is LESS than or EQUAL to 50 mg/dL.  If ordered IV, give IV Push over 1 minute. Reconstitute with 1mL sterile water.    Admin. Amount: 1 mg  Dispense Loc:  [facility]               hydrALAZINE (APRESOLINE) injection 10 mg  Dose: 10 mg  Freq: EVERY 4 HOURS PRN Route: IV  PRN Reason: high blood pressure  PRN Comment: give  for SBP > 180  Start: 06/01/18 1420   Admin Instructions: For ordered doses up to 40 mg, give IV Push undiluted over 1 minute.    Admin. Amount: 10 mg = 0.5 mL Conc: 20 mg/mL  Dispense Loc: [facility]  Volume: 0.5 mL               insulin aspart (NovoLOG) inj (RAPID ACTING)  Dose: 1-5 Units  Freq: AT BEDTIME Route: SC  Start: 06/01/18 2200   Admin Instructions: MEDIUM INSULIN RESISTANCE DOSING    Do Not give Bedtime Correction Insulin if BG less than  200.   For  - 249 give 1 units.   For  - 299 give 2 units.   For  - 349 give 3 units.   For  -399 give 4 units.   For BG greater than or equal to 400 give 5 units.  Notify provider if glucose greater than or equal to 350 mg/dL after administration of correction dose.  If given at mealtime, must be administered 5 min before meal or immediately after.    Admin. Amount: 1-5 Units  Last Admin: 06/02/18 2123  Dispense Loc: Contact Rx for dose  Volume: 3 mL         2218 (1 Units)-Given [C]        2123 (3 Units)-Given [C]        [ ] 2200           insulin aspart (NovoLOG) inj (RAPID ACTING)  Dose: 1-7 Units  Freq: 3 TIMES DAILY BEFORE MEALS Route: SC  Start: 06/01/18 1700   Admin Instructions: Correction Scale - MEDIUM INSULIN RESISTANCE DOSING     Do Not give Correction Insulin if Pre-Meal BG less than 140.   For Pre-Meal  - 189 give 1 unit.   For Pre-Meal  - 239 give 2 units.   For Pre-Meal  - 289 give 3 units.   For Pre-Meal  - 339 give 4 units.   For Pre-Meal - 399 give 5 units.   For Pre-Meal -449 give 6 units  For Pre-Meal BG greater than or equal to 450 give 7 units.   To be given with prandial insulin, and based on pre-meal blood glucose.    Notify provider if glucose greater than or equal to 350 mg/dL after administration of correction dose.  If given at mealtime, must be administered 5 min before meal or immediately after.    Admin. Amount: 1-7 Units  Last Admin: 06/03/18 0950  Dispense Loc: Contact Rx  "for dose  Volume: 3 mL         1905 (2 Units)-Given        1035 (1 Units)-Given       1254 (4 Units)-Given [C]       1727 (1 Units)-Given        0950 (1 Units)-Given       [ ] 1200       [ ] 1700           isosorbide mononitrate (IMDUR) 24 hr tablet 60 mg  Dose: 60 mg  Freq: DAILY Route: PO  Start: 06/02/18 1032   Admin Instructions: DO NOT CRUSH. Can split tablet in half along score haley.    Admin. Amount: 1 tablet (1 × 60 mg tablet)  Last Admin: 06/03/18 0951  Dispense Loc: [facility] A          1037 (60 mg)-Given        0951 (60 mg)-Given           lidocaine (LMX4) cream  Freq: EVERY 1 HOUR PRN Route: Top  PRN Reason: pain  PRN Comment: with VAD insertion or accessing implanted port.  Start: 06/01/18 1420   Admin Instructions: Do NOT give if patient has a history of allergy to any local anesthetic or any \"jonh\" product.   Apply 30 minutes prior to VAD insertion or port access.  MAX Dose:  2.5 g (  of 5 g tube)    Dispense Loc: Contact Rx for dose               lidocaine 1 % 1 mL  Dose: 1 mL  Freq: EVERY 1 HOUR PRN Route: OTHER  PRN Comment: mild pain with VAD insertion or accessing implanted port  Start: 06/01/18 1420   Admin Instructions: Do NOT give if patient has a history of allergy to any local anesthetic or any \"jonh\" product. MAX dose 1 mL subcutaneous OR intradermal in divided doses.    Admin. Amount: 1 mL  Dispense Loc:  [facility]  Volume: 20 mL               lisinopril (PRINIVIL/ZESTRIL) tablet 5 mg  Dose: 5 mg  Freq: DAILY Route: PO  Start: 06/02/18 0900   Admin. Amount: 1 tablet (1 × 5 mg tablet)  Last Admin: 06/03/18 0951  Dispense Loc:  [facility]          1037 (5 mg)-Given        0951 (5 mg)-Given           metoprolol tartrate (LOPRESSOR) tablet 50 mg  Dose: 50 mg  Freq: 2 TIMES DAILY Route: PO  Start: 06/01/18 2100   Admin Instructions: Hold for SBP<100 or HR<60    Admin. Amount: 1 tablet (1 × 50 mg tablet)  Last Admin: 06/03/18 0951  Dispense Loc:  [facility] A         2042 (50 mg)-Given     "    1036 (50 mg)-Given       2123 (50 mg)-Given        0951 (50 mg)-Given       [ ] 2100           naloxone (NARCAN) injection 0.1-0.4 mg  Dose: 0.1-0.4 mg  Freq: EVERY 2 MIN PRN Route: IV  PRN Reason: opioid reversal  Start: 06/01/18 1420   Admin Instructions: For respiratory rate LESS than or EQUAL to 8.  Partial reversal dose:  0.1 mg titrated q 2 minutes for Analgesia Side Effects Monitoring Sedation Level of 3 (frequently drowsy, arousable, drifts to sleep during conversation).Full reversal dose:  0.4 mg bolus for Analgesia Side Effects Monitoring Sedation Level of 4 (somnolent, minimal or no response to stimulation).  For ordered doses up to 2mg give IVP. Give each 0.4mg over 15 seconds in emergency situations. For non-emergent situations further dilute in 9mL of NS to facilitate titration of response.    Admin. Amount: 0.1-0.4 mg = 0.25-1 mL Conc: 0.4 mg/mL  Dispense Loc: [facility] A  Volume: 1 mL               nitroGLYcerin (NITROSTAT) sublingual tablet 0.4 mg  Dose: 0.4 mg  Freq: EVERY 5 MIN PRN Route: SL  PRN Reason: chest pain  Start: 06/01/18 1420   Admin Instructions: Maximum 3 doses in 15 minutes.  Notify provider if no relief after 3 doses.    Do NOT give nitroglycerin SL IF patient has received sildenafil (VIAGRA/REVATIO) within the last 8 hours, avanafil (STENDRA) within the last 8 hours, vardenafil (LEVITRA/STAXYN) with the last 18 hours, or tadalafil (CIALIS/ADCIRCA) within the last 36 hours    Admin. Amount: 1 tablet (1 × 0.4 mg tablet)  Last Admin: 06/02/18 0814  Dispense Loc: [facility] A          0041 (0.4 mg)-Given       0048 (0.4 mg)-Given       0814 (0.4 mg)-Given            omeprazole (priLOSEC) CR capsule 40 mg  Dose: 40 mg  Freq: 2 TIMES DAILY Route: PO  Start: 06/01/18 2100   Admin. Amount: 2 capsule (2 × 20 mg capsule)  Last Admin: 06/03/18 0950  Dispense Loc: [facility]         2042 (40 mg)-Given        1036 (40 mg)-Given       2122 (40 mg)-Given        0950 (40 mg)-Given       [ ]  2100           sodium chloride (PF) 0.9% PF flush 3 mL  Dose: 3 mL  Freq: EVERY 8 HOURS Route: IK  Start: 18 1430   Admin Instructions: And Q1H PRN, to lock peripheral IV dormant line.    Admin. Amount: 3 mL  Last Admin: 18  Dispense Loc: [facility] Stock  Volume: 3 mL   Current Line: Peripheral IV 18 Right Upper arm        1759 (3 mL)-Given       2220 (3 mL)-Given        0518 (3 mL)-Given              1729 (3 mL)-Given       2123 (3 mL)-Given               [ ] 0630       [ ] 1430       [ ] 2230           sodium chloride (PF) 0.9% PF flush 3 mL  Dose: 3 mL  Freq: EVERY 1 HOUR PRN Route: IK  PRN Reason: line flush  PRN Comment: for peripheral IV flush post IV meds  Start: 18 1420   Admin. Amount: 3 mL  Dispense Loc: [facility] Stock  Volume: 3 mL               tamsulosin (FLOMAX) capsule 0.4 mg  Dose: 0.4 mg  Freq: DAILY Route: PO  Start: 18 0900   Admin Instructions: Administer 30 minutes after the same meal each day.  Capsules should be swallowed whole; do not crush chew or open.    Admin. Amount: 1 capsule (1 × 0.4 mg capsule)  Last Admin: 1851  Dispense Loc:  [facility] A          1037 (0.4 mg)-Given        0951 (0.4 mg)-Given          Completed Medications  Medications 18         Dose: 324 mg  Freq: ONCE Route: PO  Start: 18 0805   End: 18 0828   Admin. Amount: 4 tablet (4 × 81 mg tablet)  Last Admin: 18  Dispense Loc:  [facility]  Administrations Remainin         0828 (324 mg)-Given            Discontinued Medications  Medications 18         Dose: 30 mg  Freq: DAILY Route: PO  Start: 18 0900   End: 18 1004   Admin Instructions: DO NOT CRUSH. Can split tablet in half along score haley.    Admin. Amount: 1 tablet (1 × 30 mg tablet)  Dispense Loc:  [facility]          1004-Med Discontinued  (1037)-Not Given [C]               Dose: 60 mg  Freq: DAILY Route: PO  Start: 18 0900   End: 18 1032   Admin Instructions: DO NOT CRUSH. Can split tablet in half along score haley.    Admin. Amount: 1 tablet (1 × 60 mg tablet)  Dispense Loc: [facility] A          1032-Med Discontinued          Dose: 0.4 mg  Freq: EVERY 5 MIN PRN Route: SL  PRN Reason: chest pain  Start: 18 0804   End: 18 1420   Admin. Amount: 1 tablet (1 × 0.4 mg tablet)  Last Admin: 18 08  Dispense Loc: [facility] A  Administrations Remainin         829 (0.4 mg)-Given [C]       1420-Med Discontinued

## 2018-06-02 ENCOUNTER — APPOINTMENT (OUTPATIENT)
Dept: OCCUPATIONAL THERAPY | Facility: CLINIC | Age: 83
End: 2018-06-02
Attending: HOSPITALIST
Payer: MEDICARE

## 2018-06-02 LAB
GLUCOSE BLDC GLUCOMTR-MCNC: 170 MG/DL (ref 70–99)
GLUCOSE BLDC GLUCOMTR-MCNC: 173 MG/DL (ref 70–99)
GLUCOSE BLDC GLUCOMTR-MCNC: 298 MG/DL (ref 70–99)
GLUCOSE BLDC GLUCOMTR-MCNC: 332 MG/DL (ref 70–99)

## 2018-06-02 PROCEDURE — 99214 OFFICE O/P EST MOD 30 MIN: CPT | Performed by: INTERNAL MEDICINE

## 2018-06-02 PROCEDURE — 25000132 ZZH RX MED GY IP 250 OP 250 PS 637: Mod: GY | Performed by: HOSPITALIST

## 2018-06-02 PROCEDURE — 99207 ZZC CDG-CODE CATEGORY CHANGED: CPT | Performed by: HOSPITALIST

## 2018-06-02 PROCEDURE — 93005 ELECTROCARDIOGRAM TRACING: CPT

## 2018-06-02 PROCEDURE — 96372 THER/PROPH/DIAG INJ SC/IM: CPT

## 2018-06-02 PROCEDURE — 97110 THERAPEUTIC EXERCISES: CPT | Mod: GO | Performed by: OCCUPATIONAL THERAPIST

## 2018-06-02 PROCEDURE — 99226 ZZC SUBSEQUENT OBSERVATION CARE,LEVEL III: CPT | Performed by: HOSPITALIST

## 2018-06-02 PROCEDURE — 97535 SELF CARE MNGMENT TRAINING: CPT | Mod: GO | Performed by: OCCUPATIONAL THERAPIST

## 2018-06-02 PROCEDURE — G8986 CARRY D/C STATUS: HCPCS | Mod: GO,CL | Performed by: OCCUPATIONAL THERAPIST

## 2018-06-02 PROCEDURE — 97166 OT EVAL MOD COMPLEX 45 MIN: CPT | Mod: GO | Performed by: OCCUPATIONAL THERAPIST

## 2018-06-02 PROCEDURE — 97530 THERAPEUTIC ACTIVITIES: CPT | Mod: GO | Performed by: OCCUPATIONAL THERAPIST

## 2018-06-02 PROCEDURE — G0378 HOSPITAL OBSERVATION PER HR: HCPCS

## 2018-06-02 PROCEDURE — 40000133 ZZH STATISTIC OT WARD VISIT: Performed by: OCCUPATIONAL THERAPIST

## 2018-06-02 PROCEDURE — G8979 MOBILITY GOAL STATUS: HCPCS | Mod: GO,CL | Performed by: OCCUPATIONAL THERAPIST

## 2018-06-02 PROCEDURE — A9270 NON-COVERED ITEM OR SERVICE: HCPCS | Mod: GY | Performed by: INTERNAL MEDICINE

## 2018-06-02 PROCEDURE — 25000132 ZZH RX MED GY IP 250 OP 250 PS 637: Mod: GY | Performed by: INTERNAL MEDICINE

## 2018-06-02 PROCEDURE — G8978 MOBILITY CURRENT STATUS: HCPCS | Mod: GO,CL | Performed by: OCCUPATIONAL THERAPIST

## 2018-06-02 PROCEDURE — A9270 NON-COVERED ITEM OR SERVICE: HCPCS | Mod: GY | Performed by: HOSPITALIST

## 2018-06-02 PROCEDURE — 00000146 ZZHCL STATISTIC GLUCOSE BY METER IP

## 2018-06-02 PROCEDURE — 93010 ELECTROCARDIOGRAM REPORT: CPT | Mod: 76 | Performed by: INTERNAL MEDICINE

## 2018-06-02 RX ORDER — ISOSORBIDE MONONITRATE 60 MG/1
60 TABLET, EXTENDED RELEASE ORAL DAILY
Status: DISCONTINUED | OUTPATIENT
Start: 2018-06-03 | End: 2018-06-02

## 2018-06-02 RX ORDER — FUROSEMIDE 20 MG
20 TABLET ORAL
Status: DISCONTINUED | OUTPATIENT
Start: 2018-06-02 | End: 2018-06-03 | Stop reason: HOSPADM

## 2018-06-02 RX ORDER — ISOSORBIDE MONONITRATE 60 MG/1
60 TABLET, EXTENDED RELEASE ORAL DAILY
Status: DISCONTINUED | OUTPATIENT
Start: 2018-06-02 | End: 2018-06-03 | Stop reason: HOSPADM

## 2018-06-02 RX ADMIN — INSULIN ASPART 1 UNITS: 100 INJECTION, SOLUTION INTRAVENOUS; SUBCUTANEOUS at 10:35

## 2018-06-02 RX ADMIN — FUROSEMIDE 20 MG: 20 TABLET ORAL at 16:27

## 2018-06-02 RX ADMIN — ASPIRIN 81 MG: 81 TABLET, COATED ORAL at 10:36

## 2018-06-02 RX ADMIN — OMEPRAZOLE 40 MG: 20 CAPSULE, DELAYED RELEASE ORAL at 21:22

## 2018-06-02 RX ADMIN — METOPROLOL TARTRATE 50 MG: 50 TABLET ORAL at 21:23

## 2018-06-02 RX ADMIN — OMEPRAZOLE 40 MG: 20 CAPSULE, DELAYED RELEASE ORAL at 10:36

## 2018-06-02 RX ADMIN — INSULIN ASPART 4 UNITS: 100 INJECTION, SOLUTION INTRAVENOUS; SUBCUTANEOUS at 12:54

## 2018-06-02 RX ADMIN — METOPROLOL TARTRATE 50 MG: 50 TABLET ORAL at 10:36

## 2018-06-02 RX ADMIN — DULOXETINE HYDROCHLORIDE 30 MG: 30 CAPSULE, DELAYED RELEASE ORAL at 10:36

## 2018-06-02 RX ADMIN — NITROGLYCERIN 0.4 MG: 0.4 TABLET SUBLINGUAL at 08:14

## 2018-06-02 RX ADMIN — NITROGLYCERIN 0.4 MG: 0.4 TABLET SUBLINGUAL at 00:41

## 2018-06-02 RX ADMIN — LISINOPRIL 5 MG: 5 TABLET ORAL at 10:37

## 2018-06-02 RX ADMIN — ISOSORBIDE MONONITRATE 60 MG: 60 TABLET, EXTENDED RELEASE ORAL at 10:37

## 2018-06-02 RX ADMIN — INSULIN ASPART 1 UNITS: 100 INJECTION, SOLUTION INTRAVENOUS; SUBCUTANEOUS at 17:27

## 2018-06-02 RX ADMIN — GLIPIZIDE 10 MG: 5 TABLET ORAL at 16:27

## 2018-06-02 RX ADMIN — CLOPIDOGREL 75 MG: 75 TABLET, FILM COATED ORAL at 10:37

## 2018-06-02 RX ADMIN — ATORVASTATIN CALCIUM 40 MG: 40 TABLET, FILM COATED ORAL at 21:23

## 2018-06-02 RX ADMIN — ACETAMINOPHEN 650 MG: 325 TABLET ORAL at 00:51

## 2018-06-02 RX ADMIN — NITROGLYCERIN 0.4 MG: 0.4 TABLET SUBLINGUAL at 00:48

## 2018-06-02 RX ADMIN — GLIPIZIDE 10 MG: 5 TABLET ORAL at 10:36

## 2018-06-02 RX ADMIN — TAMSULOSIN HYDROCHLORIDE 0.4 MG: 0.4 CAPSULE ORAL at 10:37

## 2018-06-02 ASSESSMENT — PAIN DESCRIPTION - DESCRIPTORS
DESCRIPTORS: PRESSURE
DESCRIPTORS: ACHING
DESCRIPTORS: PRESSURE

## 2018-06-02 ASSESSMENT — ACTIVITIES OF DAILY LIVING (ADL): PREVIOUS_RESPONSIBILITIES: MEAL PREP

## 2018-06-02 NOTE — PLAN OF CARE
Problem: Patient Care Overview  Goal: Plan of Care/Patient Progress Review  Discharge Planner OT   Patient plan for discharge: pt and wife open to TCU  Current status: Eval and treatment completed. Pt lives in a house with his wife and reports I with dressing and other ADL's. Pt uses a SEC for ambulation and per pt's wife has had increased falls and near falls. [facility], observation status due to chest pain/pressure. Pt discharged 1-2 days ago and s/p angio s/p MODESTA x2. Pt currently requires CGA/MIN A for supine to sit, sit <> stand with CGA and ambulated approx 180 ft with CGA/MIN A due to unsteadiness. Pt completed toilet transfer with CGA with cues for safe tech. Pt denies any SOB, but reports consistent upper abdomen/chest pressure 2-3/10, did not increase with functional mobility. Pt requires CGA for basic ADl's for safety and is alert and oriented to self, , place and current month off on current year. Pt's wife A's with most all IADLs driving and med mgmt, laundry, etc. Defer further OT to TCU, will complete inpt OT orders.   Barriers to return to prior living situation: decreased balance, activity tolerance, safety and overall strength  Recommendations for discharge: TCU  Rationale for recommendations: daily therapy to increase ADL and functional mobility independence and safety to PLOF.  Eventual OP CR for monitored progressive exercise and risk factor education and modification.     Occupational Therapy Discharge Summary    Reason for therapy discharge:    Pt to discharge to TCU tomorrow, defer further OT to TCU.    Progress towards therapy goal(s). See goals on Care Plan in [facility] electronic health record for goal details.  Goals not met.  Barriers to achieving goals:   pt observation status and per chart discharging to tCU tomorrow.    Therapy recommendation(s):    Continued therapy is recommended.  Rationale/Recommendations:  OT at TCU to increase ADL and functional mobility independence and  safety to PLOF and eventual OP CR for monitored progressive exercise and risk factor education and modification when return home, as appropriate. .           Entered by: Jacque Kim 06/02/2018 3:30 PM

## 2018-06-02 NOTE — PROGRESS NOTES
06/02/18 1455   Quick Adds   Type of Visit Initial Occupational Therapy Evaluation   Living Environment   Lives With spouse   Living Arrangements house   Home Accessibility stairs to enter home;stairs within home;grab bars present (bathtub);tub/shower is not walk in   Number of Stairs to Enter Home 2   Number of Stairs Within Home 7   Transportation Available family or friend will provide   Self-Care   Dominant Hand right   Usual Activity Tolerance moderate   Current Activity Tolerance fair   Regular Exercise yes   Activity/Exercise Type walking   Exercise Amount/Frequency daily  (1/4 of a mile daily)   Equipment Currently Used at Home cane, straight;grab bar   Activity/Exercise/Self-Care Comment tub with grab bar and bath bench   Functional Level Prior   Ambulation 1-->assistive equipment   Transferring 1-->assistive equipment   Toileting 1-->assistive equipment   Bathing 1-->assistive equipment   Dressing 0-->independent   Eating 0-->independent   Communication 0-->understands/communicates without difficulty   Swallowing 0-->swallows foods/liquids without difficulty   Fall history within last six months yes   Number of times patient has fallen within last six months 4   Prior Functional Level Comment Per pt and wife pt has A with most all IADl's med mgmt, pt does not drive and has been falling more.    General Information   Onset of Illness/Injury or Date of Surgery - Date 06/01/18   Referring Physician Pat Garcia MD   Patient/Family Goals Statement open to TCU   Additional Occupational Profile Info/Pertinent History of Current Problem Dustin Pearce is a 89 year old male medical history significant for diabetes, hypertension, dyslipidemia, anxiety, depression, prior transient ischemic attack with MCI, chronic systolic congestive heart failure, severe 3-vessel coronary artery disease/NSTEMI with recent drug-eluding stent to proximal left anterior descending and proximal left circumflex re admitted  observation status to the hospital on 6/1/2018 for chest discomfort.    Precautions/Limitations fall precautions   Heart Disease Risk Factors Age;Gender;Medical history;Diabetes;High blood pressure;Dislipidemia   Cognitive Status Examination   Orientation person;place  (aware of correct month and off on current year)   Level of Consciousness alert   Able to Follow Commands WNL/WFL   Personal Safety (Cognitive) moderate impairment   Cognitive Comment Will continue to monitor cognition during ADl's   Visual Perception   Visual Perception Wears glasses  (macular degeneration)   Sensory Examination   Sensory Quick Adds No deficits were identified   Pain Assessment   Patient Currently in Pain Yes, see Vital Sign flowsheet  (3/10 upper abdomen/chest discomfort)   Range of Motion (ROM)   ROM Comment B UE AROM WFL    Strength   Strength Comments B UE strength WFL, overall decreased strength.   Bed Mobility Skill: Rolling/Turning   Level of [place] - Bed Mobility Skill Rolling Turning minimum assist (75% patients effort)   Assistive Device:  Rolling/Turning bed rails   Bed Mobility Skill: Supine to Sit   Level of [place]: Supine/Sit minimum assist (75% patients effort)   Assistive Device: Supine/Sit bedrail   Transfer Skill: Bed to Chair/Chair to Bed   Level of [place]: Bed to Chair minimum assist (75% patients effort)   Assistive Device - Transfer Skill Bed to Chair Chair to Bed Rehab Eval standard walker   Transfer Skill: Sit to Stand   Level of [place]: Sit/Stand contact guard   Assistive Device for Transfer: Sit/Stand standard walker   Transfer Skill: Toilet Transfer   Level of [place]: Toilet contact guard   Assistive Device standard walker;grab bars   Lower Body Dressing   Level of [place]: Dress Lower Body contact guard   Grooming   Level of [place]: Grooming contact guard   Instrumental Activities of Daily Living (IADL)   Previous Responsibilities meal prep  (wife A's with all  "IADL's, reports does some cooking)   Activities of Daily Living Analysis   Impairments Contributing to Impaired Activities of Daily Living balance impaired;cognition impaired;strength decreased  (decreased activity tolerance)   General Therapy Interventions   Planned Therapy Interventions ADL retraining;cognition;transfer training;home program guidelines;progressive activity/exercise;risk factor education   Clinical Impression   Criteria for Skilled Therapeutic Interventions Met yes, treatment indicated   OT Diagnosis decreased ADLs and functional mobility   Influenced by the following impairments impaired balance, overall decreased strength, activity tolerance and safety   Assessment of Occupational Performance 3-5 Performance Deficits   Identified Performance Deficits impaired I and safety with dressing, toilet and tub transfer, cooking.    Clinical Decision Making (Complexity) Moderate complexity   Therapy Frequency (Eval and treatment only)   Anticipated Discharge Disposition Transitional Care Facility   Risks and Benefits of Treatment have been explained. Yes   Patient, Family & other staff in agreement with plan of care Yes   [facility] AM-PAC  \"6 Clicks\" Daily Activity Inpatient Short Form   1. Putting on and taking off regular lower body clothing? 3 - A Little   2. Bathing (including washing, rinsing, drying)? 3 - A Little   3. Toileting, which includes using toilet, bedpan or urinal? 3 - A Little   4. Putting on and taking off regular upper body clothing? 3 - A Little   5. Taking care of personal grooming such as brushing teeth? 3 - A Little   6. Eating meals? 4 - None   Daily Activity Raw Score (Score out of 24.Lower scores equate to lower levels of function) 19   Total Evaluation Time   Total Evaluation Time (Minutes) 10     "

## 2018-06-02 NOTE — PROGRESS NOTES
"SPIRITUAL HEALTH SERVICES Progress Note  [facility]    Visited pt per request. Pt's significant other, Halina, was at pt's bedside. Pt was just recently hospitalized for an angiogram and came back with chest pain. Pt was not very conversational, but when I asked how he felt about being hospitalized again, he replied, \"I'm tired of being in the hospital!\" Pt stated that this is his 6th hospitalization in the past year and a half or so. [place]. Pt and pt's SO met at work, an insurance company. Pt has one son from a previous marriage who lives in [place] and also has stepchildren. Pt would appreciate a visit from Father Filipe but the need is not urgent. Pt may discharge sometime this weekend.  has no formal plans to follow but is available upon request or need.      Linda Lawrence  Chaplain Resident  Pager: [phone]  Office: [phone]  "

## 2018-06-02 NOTE — PLAN OF CARE
Problem: Patient Care Overview  Goal: Plan of Care/Patient Progress Review  Discharge Planner PT   Patient plan for discharge: TCU  Current status: per chart review and discussion with IDT pt is planning to discharge to TCU. As pt is currently obs status and safe discharge plan in place will defer PT evaluation to next level of care (TCU). Will inpatient eval and complete order  Barriers to return to prior living situation: see OT note for details; fall risk, difficulty with functional mobility    Recommendations for discharge: TCU  Rationale for recommendations: for daily therapies to progress functional strength, balance and endurance for increased safety and indep with transfers and gait       Entered by: Kathryn Lema 06/02/2018 3:43 PM

## 2018-06-02 NOTE — PROGRESS NOTES
[facility]  Hospitalist Progress Note   06/02/2018          Assessment and Plan:       Dustin Pearce is a 89 year old male medical history significant for diabetes, hypertension, dyslipidemia, anxiety, depression, prior transient ischemic attack with MCI, chronic systolic congestive heart failure, severe 3-vessel coronary artery disease with recent drug-eluding stent to proximal left anterior descending and proximal left circumflex re admitted to the hospital on 6/1/2018 for chest discomfort.     Chest discomfort in the setting of severe 3-vessel disease, status post recent stents to left anterior descending and left circumflex.    Admitted from 05/26 to 05/31/2018 with non-ST elevation MI.  cardiac catheterization on 05/30/2018 with severe 3-vessel disease including  of RCA, severe proximal LAD and severe proximal left circumflex disease.   Underwent drug-eluting stents to LAD and left circumflex and discharged on 5/31/2018 in the evening. Immediately after discharge patient started having chest pressure. History contradictory as patient's says the symptoms have been going for few days whereas wife states that the patient started complaining of symptoms only after discharged home.  Does have associated shortness of breath on exertion.     Pro BNP elevated at 10,184.  Troponin elevated at 0.133 > 0.093 > 0.093  Glucose 224.  WBC of 7.5, hemoglobin 10.5, platelet 155.    Chest x-ray  shows no acute infiltrate, effusion or pneumothorax.  Per Radiology read, there is stable subtle opacification of left lateral lung base, likely atelectasis or scarring.    EKG shows normal sinus rhythm with frequent PVCs and bifascicular block.     Patient continues to have chest discomfort, states 3/10 in intensity, pain not resolve since hospitalization. Patient has been a readmission to the hospital in less than 24 hours after discharge. Will hold off discharge tonight and continue to monitor and plan for  discharge tomorrow likely to TCU if no acute events.   Cardiology following along, impression noncardiac chest pain recommend to continue medical management. Appreciate recommendations.    Continue PTA aspirin 81 mg daily/Plavix 75 mg daily  Continue PTA 40 mg Lipitor at bedtime.  Continue PTA imdur 30 mg daily  Sublingual nitroglycerin p.r.n.     Ischemic cardiomyopathy.  Dyspnea on exertion.  Patient has shortness of breath on minimal exertion. Pro BNP elevated at 10 184  Echocardiogram 5/27/2018 showed LV EF around 35 - 40% with moderate to severe inferolateral wall hypokinesis and moderate inferior wall hypokinesis. Mild to moderate mitral valve/aortic valve stenosis. Mild aortic root dilation  continue PTA metoprolol 50 mg two times a day and 5 mg lisinopril daily.  Will diuresis with 20 mg oral Lasix BID.  Strict input-output monitoring.  Daily weights.    Prior TIA:   Plavix was added to aspirin in 12/2017.   No focal weakness at this time.  CT imaging of the head 5/26/2018 no acute pathology.      Uncontrolled diabetes mellitus:  A1c is 9.1.  Hold Metformin and Glipizide, plan to resume on discharge  ISS insulin used while hospitalized.  Follow up with PCP for PO med optimization  has had nutrition assessment during last admission, can be followed up as outpatient.      Acute on chronic mild normocytic anemia.  Baseline hemoglobin between 11 to 12.  Presented with a hemoglobin of 10.5.  Will check iron studies, TSH, vitamin B12, folic acid.      Deconditioned from acute illness/chronic debility/age.  Patient is 89 years old with multiple medical problems, living at home with his wife. Minimal assistance from family.  Has been readmitted to the hospital in less than 24 hours after discharge.  Physical therapy/occupational therapy assessment requested for safe discharge planning.  Discussed with patient likely need for discharge to TCU for rehabilitation    Hypertension.   Continue PTA  lisinopril/metoprolol.  PRN IV hydralazine ordered.  Diuresis with IV Lasix.     Dyslipidemia.    Continue PTA atorvastatin    Gastroesophageal reflux disease.    Continue PTA Prilosec.     BPH.    Continue PTA Flomax.     Chronic depression.    Continue PTA Cymbalta.     Recent SIRS syndrome, resolved:  On last admission on 5/26/2018 patient's BP 89/50 was improved to 140/71 with IV fluid bolus. He was also hypoxic with the lactic acid of 4.8. Pro calcitonin was negative, blood cultures and urine cultures show no growth. Was treated with empirical antibiotics during hospitalization.    CT chest 5/26/2000 and PE negative for acute pathology as is CXR. EKG shows NSR with ST segment depressions in V3-4.  CT abdomen and pelvis 5/27/2018: No cause of acute pain identified in the abdomen or pelvis. Colonic diverticulosis without evidence of diverticulitis  no signs or symptoms of infection at this time.    # Pain Assessment:  Current Pain Score 6/2/2018   Patient currently in pain? -   Pain score (0-10) 3   Pain location -   Pain descriptors -   - Dustin is experiencing pain due to heart disease.. Pain management was discussed and the plan was created in a collaborative fashion.  Dustin's response to the current recommendations: mixed response  - Please see the plan for pain management as documented above    Diet cardiac diet  DVT Prophylaxis:  pneumatic compression device  Code Status: Full Code  Disposition: Expected discharge likely tomorrow to TCU if pain improves.    Patient, interdisciplinary team involved in care and agrees with plan.  Total time - Greater than 35 min. More than 50% of time spent in direct patient care, care coordination and formalizing plan of care.     Pat Garcia MD        Interval History:      patient lying in bed, complaining of chest discomfort 3/10 in intensity. Patient states he has this constant chest pressure that has never resolved since his previous admission.  Shortness of breath  on minimal ambulation.  No palpitations.  Tolerating oral diet.  Needs assistance with ambulation out of bed to the bathroom.       Physical Exam:        Physical Exam   Temp:  [97.6  F (36.4  C)-98.9  F (37.2  C)] 97.9  F (36.6  C)  Pulse:  [56-95] 60  Heart Rate:  [50-82] 51  Resp:  [15-18] 18  BP: ()/(45-75) 109/50  SpO2:  [92 %-99 %] 92 %    Intake/Output Summary (Last 24 hours) at 06/02/18 1507  Last data filed at 06/02/18 1315   Gross per 24 hour   Intake              570 ml   Output             1025 ml   Net             -455 ml     Admission Weight: 72.6 kg (160 lb)  Current Weight: 71.6 kg (157 lb 14.4 oz)    PHYSICAL EXAM  GENERAL: Patient is in no distress. Alert and oriented to simple commands  HEART: Regular rate and rhythm. S1S2. Systolic murmur right sternal area  LUNGS: bilateral decreased breath sounds, no crackles no wheezing.  ABDOMEN: Soft, no abdominal tenderness, bowel sounds heard   NEURO: moving all extremities  EXTREMITIES: trace pedal edema. 2+ peripheral pulses.  SKIN: Warm, dry. No rash or bruising.         Medications:          aspirin  81 mg Oral Daily     atorvastatin (LIPITOR) tablet 40 mg  40 mg Oral At Bedtime     clopidogrel (PLAVIX) tablet 75 mg  75 mg Oral Daily     DULoxetine  30 mg Oral Daily     glipiZIDE  10 mg Oral BID AC     insulin aspart  1-7 Units Subcutaneous TID AC     insulin aspart  1-5 Units Subcutaneous At Bedtime     isosorbide mononitrate  60 mg Oral Daily     lisinopril  5 mg Oral Daily     metoprolol tartrate  50 mg Oral BID     omeprazole (priLOSEC) CR capsule 40 mg  40 mg Oral BID     sodium chloride (PF)  3 mL Intracatheter Q8H     tamsulosin  0.4 mg Oral Daily     acetaminophen, acetaminophen, alum & mag hydroxide-simethicone, amitriptyline (ELAVIL) tablet 25 mg, glucose **OR** dextrose **OR** glucagon, hydrALAZINE, lidocaine 4%, lidocaine (buffered or not buffered), naloxone, nitroGLYcerin, sodium chloride (PF)         Data:      All new lab and  imaging data was reviewed.

## 2018-06-02 NOTE — PLAN OF CARE
Problem: Patient Care Overview  Goal: Plan of Care/Patient Progress Review  Chest discomfort x1 this am relieved with NTG sl.  Imdur dose increased this am and has had no further discomfort.  Up in chair, ambulated to bathroom several times and hallway without c/o pain.  Plan to TCU tomorrow.

## 2018-06-02 NOTE — PROGRESS NOTES
[facility]      OUTPATIENT OCCUPATIONAL THERAPY  EVALUATION  PLAN OF TREATMENT FOR OUTPATIENT REHABILITATION  (COMPLETE FOR INITIAL CLAIMS ONLY)  Patient's Last Name, First Name, M.I.  YOB: 1928  Dustin Pearce                          Provider's Name  [facility] Medical Record No.  [id number]                               Onset Date:  06/01/18   Start of Care Date:   6/2/18     Type:     ___PT   _X_OT   ___SLP Medical Diagnosis:   Chest pain                        OT Diagnosis:  decreased ADLs and functional mobility   Visits from SOC:  1   _________________________________________________________________________________  Plan of Treatment/Functional Goals    Planned Interventions: ADL retraining, cognition, transfer training, home program guidelines, progressive activity/exercise, risk factor education,       Goals: See Occupational Therapy Goals on Care Plan in [facility] electronic health record.    Therapy Frequency:  (Eval and treatment only)  Predicted Duration of Therapy Intervention:    _________________________________________________________________________________    I CERTIFY THE NEED FOR THESE SERVICES FURNISHED UNDER        THIS PLAN OF TREATMENT AND WHILE UNDER MY CARE     (Physician co-signature of this document indicates review and certification of the therapy plan).                 ,      Referring Physician: Pat Garcia MD            Initial Assessment        See Occupational Therapy evaluation dated   in Epic electronic health record.

## 2018-06-02 NOTE — PROGRESS NOTES
[facility]  Cardiology Progress Note    Outpatient cardiologist: Dr. Samuel.    Date of Service (when I saw the patient): 06/02/2018    Summary: Dustin Pearce is a 89 year old male with history of chronic systolic CHF, moderate AS, DM, PA s/p LE angioplasty/stenting, paroxysmal atrial fibrillation, HTN, dyslipidemia who was admitted on 5/26/2018 with dizziness, syncope, n/v, and chest discomfort after vomiting. CT was negative for PE. Cardiology was consulted due to troponin elevated. Initially he was managed conservatively due to lack of recurrent symptoms and the presumption that his underlying illness caused a small, demand infarct. He had recurrent chest discomfort on 5/28 associated with recurrent anterolateral ST depression and dynamic inferior T wave changes. Troponin increased from 0.5 to 0.7. Cardiology was therefore asked to reevaluation.      Interval History   Continues to do well ,continues to have similar pressure epigastric pain. Troponin trending down. Continues to have PVCs on ECG     Assessment & Plan        Readmitted with chest pain. Unclear etiology. With continued pain overnight would recommnend monitoring for another 24 hours. Additionally ECG shows ST depressions and T wave inversions in the inferolateral region that is different from ECG yestrday. Will continue to increased Imdur as tolerated. Plan per Dr. Emerson was to discharge given likely noncardiac pain. Given ECG will monitor for another 24 hours.     1) Increase imdur to 60 mg daily  2) consider further evaluation of epigastric pain     Agree this likely represents noncardiac pain in setting of continued symptoms despite recent stents vs. Refractory angina which will require medical management.    Will sign off now.     1.  CAD. Coronary angiogram done due to recurrent chest discomfort associated with EKG changes with ST delevation.   -  Angiogram showed severe disease in the proximal LAD and LCx. The RCA is occluded  with large left to right collaterals.   -  S/p stenting of the LAD and Lcx with good results. He has mild to moderate diffuse disease which remains.   -  Continue aspirin and plavix, statin, and Toprolol. Imdur started 5/29 and Toprolol dose increased.  Lisinopril resumed 5/30.  2.  Ischemic cardiomyopathy. LVEF has historically been around 35 - 40%. Repeat echocardiogram shows stable LVEF with moderate to severe inferolateral wall hypokinesis and moderate inferior wall hypokinesis.   -  Continue beta blocker, lisinopril. Has not require diuretic therapy.   2.  SIRS syndrome. No clear source of infection identified.   3.  Anemia. Continue to monitor.   4.  PAD.  5.  Chronic systolic congestive heart failure.   6.  Hypertension.   7.  History of CVA.  8.  Mild to moderate aortic stenosis.     Plan:  1.  Continue aspirin and plavix for dual antiplatelet therapy.    2.  On appropriate medications including metoprolol and lisinopril. Can titrate these up as needed for BP control.   3.  He has a podiatry appointment next Friday June 8th and wife would like to follow up that day to make it easier on their schedule - scheduled appointment with ASPEN Whitten on 6/8/18 at 12:30 pm. Also scheduled an appointment with Dr. Samuel on 8/31/18 at 10:45 pm.   4.  Plan for hopefully home later today     Lucie Soto MD    Physical Exam   Temp: 98  F (36.7  C) Temp src: Oral BP: 124/64 Pulse: 60 Heart Rate: 50 Resp: 16 SpO2: 99 % O2 Device: Nasal cannula Oxygen Delivery: 3 LPM  Vitals:    06/01/18 0802 06/01/18 1400 06/02/18 0600   Weight: 72.6 kg (160 lb) 72.7 kg (160 lb 3.2 oz) 71.6 kg (157 lb 14.4 oz)     Vital Signs with Ranges  Temp:  [97.6  F (36.4  C)-98.9  F (37.2  C)] 98  F (36.7  C)  Pulse:  [56-95] 60  Heart Rate:  [50-82] 50  Resp:  [12-16] 16  BP: ()/(45-81) 124/64  SpO2:  [95 %-99 %] 99 %  05/28 0700 - 06/02 0659  In: 350 [P.O.:350]  Out: 975 [Urine:975]  Net: -625    Constitutional: Sleeping.  Heart:  RRR  Lungs: clear  Abd: soft  Extremities: no edema    Data   Results for orders placed or performed during the hospital encounter of 06/01/18 (from the past 24 hour(s))   Troponin I (now)   Result Value Ref Range    Troponin I ES 0.133 (HH) 0.000 - 0.045 ug/L   Cardiology IP Consult: Patient to be seen: Routine - within 24 hours; Chest pain; recent stent; Consultant may enter orders: Yes    Narrative    Janis Greene PA-C     6/1/2018  3:58 PM  [facility]    Cardiology Consultation    Date of Admission:  6/1/2018  Assessment & Plan   Dustin Pearce is a 89 year old male who was admitted on   6/1/2018. I was asked to see the patient for recurrent chest pain   in the setting of recent stenting.    Summary:   1.  Chest/epigastric discomfort.   2.  CAD with severe three vessel disease s/p stenting of the LAD   and LCx on 5/30.   -  Continue aspirin, plavix, statin, and imdur.   3.  Ischemic cardiomyopathy  LVEF 35 - 40%.   -  Continue metoprolol, lisinopril. He has no required diuretic   therapy.   4.  Hypertension.   5.  DM type II.   6.  Anemia.    Impression/plan:  Dustin presents with several hours of epigastric discomfort in the   setting of recent intervention and drug eluting stent placement   to his LAD and LCx on 5/30. He tells me this pain is entirely   different from the pain he was experiencing last hospitalization,   although he cannot describe this pain for me. On exam, it seems   to be more localized to the epigastric region although he is not   significantly tender.  His LFTs and lipase were unremarkable.    His troponin levels are trending down from .651 on 5/29 to 0.130.   Repeat levels have been flat. At this point his pain seems   non-cardiac in nature. ? Gastritis. We will continue to follow   his troponin levels and monitor for any recurrence of his pain.   We will also watch him on telemetry.  If his troponin levels were   to rise or he were to have convincing  recurrent pain, we could   consider repeat coronary angiography but would not pursue this at   this point.    He should continue on aspirin, plavix, lisinopril, and   metoprolol. If his BPs remain elevated, we could increase his   lisinopril. If he has no recurrent symptoms and troponins remain   flat/trending, he can likely be discharged this weekend.   Cardiology follow up has already been arranged in the clinic for   next week.     Janis Greene PA-C    Reason for Consult   Reason for consult: I was asked by Dr. Whitaker to evaluate this   patient for chest discomfort.    Primary Care Physician   Matt Morrissey    Chief Complaint   Chest pain    History is obtained from the patient and wife.    History of Present Illness   Dustin Pearce is a 89 year old male with a history of chronic   systolic CHF with a LVEF of 35 - 40%, moderate AS, DM, PA s/p LE   angioplasty/stenting, paroxysmal atrial fibrillation, HTN,   dyslipidemia who was admitted today with chest discomfort. He had   an abnormal stress test in March of 2017 which showed a evidence   of a prior MI in the RCA/circumflex distribution with mild   ezekiel-infarct ischemia. Continued medical management was   recommended. He has followed with Dr. Samuel in clinic and was last   seen in December.     He was recently admitted on 5/26/18 with SIRS syndrome and chest   discomfort. He was hypotensive and hypoxic on admission.   Infectious work up was negative. Troponin levels were elevated up   to 2.3. EKG showed some new ST depression in the lateral leads.   Echocardiogram showed a stable LVEF of 35 - 40% with moderate to   severe inferolateral wall hypokinesis and moderate inferior wall   hypokinesis. Cardiology was consulted. His troponin elevation was   felt to likely represent demand ischemia in the setting of   sepsis. As he was asymptomatic without chest discomfort, family   wanted to pursue more conservative management and hold off on an    angiogram.    On 5/29, he developed recurrent chest discomfort associated with   recurrent anterolateral ST depression and inferior T wave   changes. His pain resolved with nitroglycerin. Troponin increased   from 0.5 to 0.7. After discussion, he agreed to coronary   angiography.     This was done on 5/30. He had severe disease in the proximal LAD   and LCx with 90% stenosis. The RCA is occluded with large left to   right collaterals. A very small distal OM branch was occluded   with collaterals. He underwent stenting of the LAD and LCx with   good results. Mild to moderate diffuse disease remains.     He did well with cardiac rehab without recurrent symptoms. He did   have a short run of NSVT during cardiac rehab which was   asymptomatic. He was discharged home yesterday, 5/31, on   metoprolol, imdur, lisinopril, aspirin and plavix.     History is obtained from the patient and his wife. He is a fair   historian at best. Last night, he told his wife around 9 pm that   he had developed some chest discomfort. He was able to go to   sleep last night but his pain persisted when he woke up this   morning. When I ask where his pain is he points to his epigastric   region. The pain persisted for several hours this morning but has   now resolved. It did not improve much with nitroglycerin. He   tells me that it is completely different from the pain he was   experiencing prior to his stents although when I ask him to   explain this pain he can give me no details about it.     In addition to his pain he perhaps noted some mild shortness of   breath when walking to the bathroom this morning. He also thinks   he was a little short of breath last night when lying in bed. He   had no SOB with cardiac rehab yesterday. No nausea, vomiting,   PND, or peripheral edema.     Past Medical History    Past Medical History:   Diagnosis Date     Aortic root dilatation (H)      Aortic stenosis      Benign essential hypertension 1/6/2017      Benign non-nodular prostatic hyperplasia with lower urinary   tract symptoms 10/20/2016     CAD (coronary artery disease)     MI 1970     Cardiomyopathy (H)      Carotid artery stenosis 10/20/2016    chronically occluded left internal carotid artery     Carotid occlusion, left      Coronary artery disease involving native coronary artery of   native heart without angina pectoris 10/20/2016     Diabetes mellitus (H)      DJD (degenerative joint disease)      Gastro-oesophageal reflux disease      Gastroesophageal reflux disease without esophagitis 10/20/2016     Heart attack      Hyperlipidemia      Hypertension      Mild cognitive impairment 10/20/2016     Nephrolithiasis     RECURRENT     Osteopenia      PAD (peripheral artery disease) (H)     peripheral angiogram 5/2017: The common iliac artery stenoses   were stented and the superficial femoral artery stenoses were   angioplastied     Paroxysmal atrial fibrillation (H)      PONV (postoperative nausea and vomiting)      RBBB with left anterior fascicular block      Unspecified cerebral artery occlusion with cerebral infarction          Past Surgical History   Past Surgical History:   Procedure Laterality Date     AMPUTATE FOOT Left 6/4/2017    Procedure: AMPUTATE FOOT;  Sun Add#3: partial left foot   amputation - Sagital Saw - Mini C-Arm;  Surgeon: Moe Kirk DPM;  Location:  OR     CHOLECYSTECTOMY       ENDARTERECTOMY CAROTID Right 1/3/2018    Procedure: ENDARTERECTOMY CAROTID;  RIGHT CAROTID ENDARTERECTOMY   WITH EEG;  Surgeon: Roland Rodriguez MD;  Location:  OR     ESOPHAGOSCOPY, GASTROSCOPY, DUODENOSCOPY (EGD), COMBINED N/A   12/26/2016    Procedure: COMBINED ESOPHAGOSCOPY, GASTROSCOPY, DUODENOSCOPY   (EGD);  Surgeon: Nasim Louis MD;  Location:  GI     GENITOURINARY SURGERY      KIDNEY STONE REMOVAL X 4     HC UGI ENDOSCOPY W EUS N/A 12/29/2016    Procedure: COMBINED ENDOSCOPIC ULTRASOUND, ESOPHAGOSCOPY,   GASTROSCOPY,  DUODENOSCOPY (EGD);  Surgeon: Nasim Louis MD;  Location:  GI     HERNIA REPAIR       INCISION AND DRAINAGE FOOT, COMBINED Left 6/6/2017    Procedure: COMBINED INCISION AND DRAINAGE FOOT;  REVISIONAL LEFT   FOOT INCISION AND DRAINAGE WITH POSSIBLE FLAP CLOSURE ,   ANTIBIOTIC SOLUTION ;  Surgeon: Nabil Ann DPM;    Location:  OR     LASER HOLMIUM LITHOTRIPSY URETER(S), INSERT STENT, COMBINED    3/2/2012    Procedure:COMBINED CYSTOSCOPY, URETEROSCOPY, LASER HOLMIUM   LITHOTRIPSY URETER(S), INSERT STENT; CYSTOSCOPY, RIGHT   RETROGRADES, RIGHT URETEROSCOPY, HOLMIUM LASER, RIGHT STENT   PLACEMENT; Surgeon:ANJELICA LEÓN; Location: OR     ROTATOR CUFF REPAIR RT/LT         Prior to Admission Medications   Prior to Admission Medications   Prescriptions Last Dose Informant Patient Reported? Taking?   AMITRIPTYLINE HCL PO Past Month at more than 2 weeks ago   Spouse/Significant Other Yes Yes   Sig: Take 25 mg by mouth nightly as needed for sleep   ATORVASTATIN CALCIUM PO 5/31/2018 at pm Spouse/Significant Other   Yes Yes   Sig: Take 40 mg by mouth At Bedtime    Cholecalciferol 20604 UNITS TABS 5/31/2018 at Unknown time   Spouse/Significant Other Yes Yes   Sig: Take 1 tablet by mouth three times a week (Mon, Wed, Fri)   Clopidogrel Bisulfate, [id number], (PLAVIX PO) 5/31/2018 at   Unknown time Spouse/Significant Other Yes Yes   Sig: Take 75 mg by mouth daily    DULoxetine (CYMBALTA) 30 MG EC capsule 5/31/2018 at Unknown time   Spouse/Significant Other No Yes   Sig: Take 1 capsule (30 mg) by mouth daily   METFORMIN HCL PO 5/31/2018 at pm Spouse/Significant Other Yes Yes     Sig: Take 1,000 mg by mouth 2 times daily (with meals)   OMEPRAZOLE PO 5/31/2018 at pm Spouse/Significant Other Yes Yes   Sig: Take 40 mg by mouth 2 times daily    TRUE METRIX BLOOD GLUCOSE TEST test strip  Spouse/Significant   Other No No   Sig: USE TO TEST BLOOD SUGAR THREE TIMES DAILY OR AS DIRECTED   aspirin EC 81 MG EC  tablet 5/31/2018 at Unknown time   Spouse/Significant Other No Yes   Sig: Take 1 tablet (81 mg) by mouth daily   bisacodyl (DULCOLAX) 10 MG Suppository prn med taken more than a   month ago Spouse/Significant Other Yes Yes   Sig: Place 10 mg rectally daily as needed for constipation   glipiZIDE (GLUCOTROL) 10 MG tablet 5/31/2018 at Unknown time   Spouse/Significant Other Yes Yes   Sig: Take 1 tablet (10 mg) by mouth 2 times daily (before meals)   ipratropium (ATROVENT) 0.03 % spray prn med Spouse/Significant   Other No Yes   Sig: INHALE 1 SPRAY IN EACH NOSTRIL EVERY 12 HOURS AS NEEDED   isosorbide mononitrate (IMDUR) 30 MG 24 hr tablet 5/31/2018 at am   Spouse/Significant Other No Yes   Sig: Take 1 tablet (30 mg) by mouth daily   lisinopril (PRINIVIL/ZESTRIL) 5 MG tablet 5/31/2018 at am   Spouse/Significant Other No Yes   Sig: Take 1 tablet (5 mg) by mouth daily   magnesium oxide (MAG-OX) 400 (241.3 Mg) MG tablet 5/31/2018 at   Unknown time Spouse/Significant Other No Yes   Sig: TAKE 1 TABLET BY MOUTH TWICE DAILY   metoprolol tartrate (LOPRESSOR) 50 MG tablet 5/31/2018 at am   Spouse/Significant Other No Yes   Sig: Take 1 tablet (50 mg) by mouth 2 times daily   nitroGLYcerin (NITROSTAT) 0.4 MG sublingual tablet    Spouse/Significant Other No Yes   Sig: For chest pain place 1 tablet under the tongue every 5   minutes for 3 doses. If symptoms persist 5 minutes after 1st dose   call 911.   order for DME  Spouse/Significant Other No No   Sig: Equipment being ordered: True [employer] Blood Glucose meter.   polyethylene glycol (MIRALAX/GLYCOLAX) Packet 5/31/2018   Spouse/Significant Other Yes Yes   Sig: Take 17 g by mouth daily as needed for constipation   tamsulosin (FLOMAX) 0.4 MG capsule 5/31/2018 at Unknown time   Spouse/Significant Other No Yes   Sig: Take 1 capsule (0.4 mg) by mouth daily      Facility-Administered Medications: None     Allergies   Allergies   Allergen Reactions     Oxycodone Other (See Comments)      "\"TERRIBLE SWEATING\"     Sulfa Drugs      Tetracycline        Social History   . Former smoker, quit in 1999. 1 alcoholic beverage per   week. No drug use.    Family History   Family History   Problem Relation Age of Onset     Breast Cancer Mother      Heart Failure Father 81       Review of Systems   The 10 point Review of Systems is negative other than noted in   the HPI or here.    Physical Exam   Temp: 97.8  F (36.6  C) Temp src: Oral BP: 136/81 Pulse: 75 Heart   Rate: 75 Resp: 16 SpO2: 96 % O2 Device: None (Room air)    Vital Signs with Ranges  Temp:  [97.8  F (36.6  C)-99.1  F (37.3  C)] 97.8  F (36.6  C)  Pulse:  [75] 75  Heart Rate:  [67-76] 75  Resp:  [11-20] 16  BP: (136-177)/(65-83) 136/81  SpO2:  [94 %-96 %] 96 %  160 lbs 3.2 oz    Constitutional: Alert, no acute distress.  Eyes: sclera anicteric  Respiratory: No respiratory distress. Breath sounds are CTAB. No   wheezes, rhonchi, or rales.  Cardiovascular: Regular rate and rhythm. Normal S1, S2. No   murmurs, rubs, or gallops.  GI: abdomen soft.  Skin: warm and dry.   Musculoskeletal: No LE edema bilaterally. Viktoria  Neurologic: alert and oriented x 3.  Psychiatric: affect appropriate.     Data   -Data reviewed today: All pertinent laboratory and imaging   results from this encounter were reviewed. I personally reviewed   the EKG tracing showing NSR.    Recent Labs  Lab 06/01/18  1015 06/01/18  0810 05/31/18  0600 05/30/18  0540 05/29/18  0635  05/27/18  0710  05/27/18  0119   WBC  --  7.5 8.5 9.1 7.5  < > 7.6  --   --    HGB  --  10.5* 10.5* 11.0* 11.1*  < > 10.7*  --   --    MCV  --  91 91 90 92  < > 92  --   --    PLT  --  155 148* 166 165  < > 144*  --   --    NA  --  142 139 138 140  < > 142  --   --    POTASSIUM  --  4.1 4.3 4.2 4.9  < > 4.8  --   --    CHLORIDE  --  106 105 104 106  < > 112*  --   --    CO2  --  29 30 25 28  < > 25  --   --    BUN  --  15 10 12 8  < > 20  --   --    CR  --  0.70 0.81 0.68 0.82  < > 0.84  --   --    ANIONGAP  " --  7 4 9 6  < > 5  --   --    ALLISON  --  9.1 8.6 9.0 8.8  < > 8.0*  --   --    GLC  --  224* 200* 211* 187*  < > 195*  --   --    ALBUMIN  --  2.9*  --   --   --   --  2.9*  --   --    PROTTOTAL  --  6.6*  --   --   --   --  5.8*  --   --    BILITOTAL  --  0.6  --   --   --   --  0.3  --   --    ALKPHOS  --  67  --   --   --   --  60  --   --    ALT  --  14  --   --   --   --  15  --   --    AST  --  15  --   --   --   --  18  --   --    LIPASE  --  104  --   --   --   --   --   --   --    TROPI 0.133* 0.130*  --   --  0.651*  < > 0.898*  < >  --    TROPONIN  --   --   --   --   --   --   --   --  0.00   < > = values in this interval not displayed.    Telemetry NSR    Imaging:  Recent Results (from the past 24 hour(s))   XR Chest 2 Views    Narrative    XR CHEST 2 VW 6/1/2018 8:22 AM    HISTORY: Pain.    COMPARISON: May 26, 2018.      Impression    IMPRESSION: Stable subtle opacification of the left lateral lung   base,  likely atelectasis or scarring as before. No new focal pulmonary  opacities otherwise. No pleural effusions or pneumothorax. Stable  heart and mediastinum.    GALDINO LANDERS MD      Glucose by meter   Result Value Ref Range    Glucose 201 (H) 70 - 99 mg/dL   Troponin I - Now then in 4 hours x 2    Result Value Ref Range    Troponin I ES 0.093 (H) 0.000 - 0.045 ug/L   Glucose by meter   Result Value Ref Range    Glucose 236 (H) 70 - 99 mg/dL   Troponin I - Now then in 4 hours x 2    Result Value Ref Range    Troponin I ES 0.093 (H) 0.000 - 0.045 ug/L   Glucose by meter   Result Value Ref Range    Glucose 209 (H) 70 - 99 mg/dL   EKG 12-lead, tracing only   Result Value Ref Range    Interpretation ECG Click View Image link to view waveform and result    Glucose by meter   Result Value Ref Range    Glucose 173 (H) 70 - 99 mg/dL   EKG 12-lead, tracing only   Result Value Ref Range    Interpretation ECG Click View Image link to view waveform and result      Echocardiogram 5/27/18:  Interpretation  Summary  The visual ejection fraction is estimated at 35-40%.  There is moderate to severe inferolateral wall hypokinesis.  There is moderate inferior wall hypokinesis.  The right ventricle is normal in size and function.  The left atrium is mildly dilated.  There is mild mitral stenosis.  Mild to moderate valvular aortic stenosis.  Mild aortic root dilatation.     Tele NSR with BBB    Medications       aspirin  81 mg Oral Daily     atorvastatin (LIPITOR) tablet 40 mg  40 mg Oral At Bedtime     clopidogrel (PLAVIX) tablet 75 mg  75 mg Oral Daily     DULoxetine  30 mg Oral Daily     glipiZIDE  10 mg Oral BID AC     insulin aspart  1-7 Units Subcutaneous TID AC     insulin aspart  1-5 Units Subcutaneous At Bedtime     isosorbide mononitrate  30 mg Oral Daily     lisinopril  5 mg Oral Daily     metoprolol tartrate  50 mg Oral BID     omeprazole (priLOSEC) CR capsule 40 mg  40 mg Oral BID     sodium chloride (PF)  3 mL Intracatheter Q8H     tamsulosin  0.4 mg Oral Daily

## 2018-06-02 NOTE — PLAN OF CARE
"Problem: Patient Care Overview  Goal: Plan of Care/Patient Progress Review  Outcome: Improving  Admit with \"pressure in his chest\".  To note that the patient was discharged yesterday from [facility].   VSS and cardiac painfree since admit.  Sinus rhythm without ectopy.  Trops .133 and .093   HGB A1C elevated.  Insulin coverage.       "

## 2018-06-02 NOTE — PROGRESS NOTES
Pt had episode of chest discomfort, upper sternal up to throat area aching rating 3/10.  Pt states has had discomfort intermittently throughout night. B/P 135/60, HR 60's.  EKG done, O2 applied at 3L/NC, NTG sl x1 give with eventual relief.  Discussed chest pain episode with Tuyet Garcia and Dr. Soto and further treatment.

## 2018-06-02 NOTE — PLAN OF CARE
Problem: Patient Care Overview  Goal: Plan of Care/Patient Progress Review  Outcome: Therapy, progress toward functional goals is gradual  C/O chest pressure 3-4/10, 2 nitro/tylenol SL given with relief. A/O, Cachil DeHe. SBA to bathroom, voids well. Tele SR c LBBB/PAC's.

## 2018-06-02 NOTE — PLAN OF CARE
Problem: Patient Care Overview  Goal: Plan of Care/Patient Progress Review  Outcome: No Change  [id number]: A/O, does not call, alarms utilized. Denies pain. VSS. BGM. A1 w/cane. Tele SB w/BBB. Cont to monitor

## 2018-06-03 VITALS
SYSTOLIC BLOOD PRESSURE: 93 MMHG | DIASTOLIC BLOOD PRESSURE: 39 MMHG | HEIGHT: 65 IN | TEMPERATURE: 98.1 F | HEART RATE: 60 BPM | WEIGHT: 158.4 LBS | OXYGEN SATURATION: 92 % | BODY MASS INDEX: 26.39 KG/M2 | RESPIRATION RATE: 18 BRPM

## 2018-06-03 LAB
ANION GAP SERPL CALCULATED.3IONS-SCNC: 7 MMOL/L (ref 3–14)
BUN SERPL-MCNC: 13 MG/DL (ref 7–30)
CALCIUM SERPL-MCNC: 8.4 MG/DL (ref 8.5–10.1)
CHLORIDE SERPL-SCNC: 105 MMOL/L (ref 94–109)
CK SERPL-CCNC: 41 U/L (ref 30–300)
CO2 SERPL-SCNC: 27 MMOL/L (ref 20–32)
CREAT SERPL-MCNC: 0.82 MG/DL (ref 0.66–1.25)
FOLATE SERPL-MCNC: 19.5 NG/ML
GFR SERPL CREATININE-BSD FRML MDRD: 88 ML/MIN/1.7M2
GLUCOSE BLDC GLUCOMTR-MCNC: 156 MG/DL (ref 70–99)
GLUCOSE BLDC GLUCOMTR-MCNC: 176 MG/DL (ref 70–99)
GLUCOSE SERPL-MCNC: 145 MG/DL (ref 70–99)
HGB BLD-MCNC: 9.8 G/DL (ref 13.3–17.7)
IRON SATN MFR SERPL: 9 % (ref 15–46)
IRON SERPL-MCNC: 27 UG/DL (ref 35–180)
POTASSIUM SERPL-SCNC: 3.8 MMOL/L (ref 3.4–5.3)
SODIUM SERPL-SCNC: 139 MMOL/L (ref 133–144)
T4 FREE SERPL-MCNC: 0.98 NG/DL (ref 0.76–1.46)
TIBC SERPL-MCNC: 287 UG/DL (ref 240–430)
TSH SERPL DL<=0.005 MIU/L-ACNC: 4.45 MU/L (ref 0.4–4)
VIT B12 SERPL-MCNC: 230 PG/ML (ref 193–986)

## 2018-06-03 PROCEDURE — 25000132 ZZH RX MED GY IP 250 OP 250 PS 637: Mod: GY | Performed by: INTERNAL MEDICINE

## 2018-06-03 PROCEDURE — 99217 ZZC OBSERVATION CARE DISCHARGE: CPT | Performed by: HOSPITALIST

## 2018-06-03 PROCEDURE — 85018 HEMOGLOBIN: CPT | Performed by: HOSPITALIST

## 2018-06-03 PROCEDURE — 00000146 ZZHCL STATISTIC GLUCOSE BY METER IP

## 2018-06-03 PROCEDURE — A9270 NON-COVERED ITEM OR SERVICE: HCPCS | Mod: GY | Performed by: HOSPITALIST

## 2018-06-03 PROCEDURE — 82550 ASSAY OF CK (CPK): CPT | Performed by: HOSPITALIST

## 2018-06-03 PROCEDURE — 84439 ASSAY OF FREE THYROXINE: CPT | Performed by: HOSPITALIST

## 2018-06-03 PROCEDURE — 82607 VITAMIN B-12: CPT | Performed by: HOSPITALIST

## 2018-06-03 PROCEDURE — 84443 ASSAY THYROID STIM HORMONE: CPT | Performed by: HOSPITALIST

## 2018-06-03 PROCEDURE — G0378 HOSPITAL OBSERVATION PER HR: HCPCS

## 2018-06-03 PROCEDURE — 96372 THER/PROPH/DIAG INJ SC/IM: CPT

## 2018-06-03 PROCEDURE — 25000132 ZZH RX MED GY IP 250 OP 250 PS 637: Mod: GY | Performed by: HOSPITALIST

## 2018-06-03 PROCEDURE — 83550 IRON BINDING TEST: CPT | Performed by: HOSPITALIST

## 2018-06-03 PROCEDURE — 82746 ASSAY OF FOLIC ACID SERUM: CPT | Performed by: HOSPITALIST

## 2018-06-03 PROCEDURE — 80048 BASIC METABOLIC PNL TOTAL CA: CPT | Performed by: HOSPITALIST

## 2018-06-03 PROCEDURE — 36415 COLL VENOUS BLD VENIPUNCTURE: CPT | Performed by: HOSPITALIST

## 2018-06-03 PROCEDURE — A9270 NON-COVERED ITEM OR SERVICE: HCPCS | Mod: GY | Performed by: INTERNAL MEDICINE

## 2018-06-03 PROCEDURE — 99207 ZZC CDG-CODE CATEGORY CHANGED: CPT | Performed by: HOSPITALIST

## 2018-06-03 PROCEDURE — 83540 ASSAY OF IRON: CPT | Performed by: HOSPITALIST

## 2018-06-03 PROCEDURE — 99214 OFFICE O/P EST MOD 30 MIN: CPT | Performed by: INTERNAL MEDICINE

## 2018-06-03 RX ORDER — ISOSORBIDE MONONITRATE 30 MG/1
60 TABLET, EXTENDED RELEASE ORAL DAILY
Qty: 60 TABLET | Refills: 0 | DISCHARGE
Start: 2018-06-03 | End: 2019-04-08

## 2018-06-03 RX ORDER — FUROSEMIDE 20 MG
20 TABLET ORAL EVERY OTHER DAY
Qty: 30 TABLET | Refills: 0 | DISCHARGE
Start: 2018-06-03 | End: 2019-04-08

## 2018-06-03 RX ORDER — FERROUS GLUCONATE 324(38)MG
324 TABLET ORAL
Qty: 100 TABLET | Refills: 0 | Status: SHIPPED | OUTPATIENT
Start: 2018-06-03 | End: 2019-04-08

## 2018-06-03 RX ORDER — FERROUS GLUCONATE 324(38)MG
324 TABLET ORAL
Status: DISCONTINUED | OUTPATIENT
Start: 2018-06-03 | End: 2018-06-03 | Stop reason: HOSPADM

## 2018-06-03 RX ADMIN — ISOSORBIDE MONONITRATE 60 MG: 60 TABLET, EXTENDED RELEASE ORAL at 09:51

## 2018-06-03 RX ADMIN — LISINOPRIL 5 MG: 5 TABLET ORAL at 09:51

## 2018-06-03 RX ADMIN — ASPIRIN 81 MG: 81 TABLET, COATED ORAL at 09:51

## 2018-06-03 RX ADMIN — OMEPRAZOLE 40 MG: 20 CAPSULE, DELAYED RELEASE ORAL at 09:50

## 2018-06-03 RX ADMIN — TAMSULOSIN HYDROCHLORIDE 0.4 MG: 0.4 CAPSULE ORAL at 09:51

## 2018-06-03 RX ADMIN — INSULIN ASPART 1 UNITS: 100 INJECTION, SOLUTION INTRAVENOUS; SUBCUTANEOUS at 09:50

## 2018-06-03 RX ADMIN — GLIPIZIDE 10 MG: 5 TABLET ORAL at 09:51

## 2018-06-03 RX ADMIN — DULOXETINE HYDROCHLORIDE 30 MG: 30 CAPSULE, DELAYED RELEASE ORAL at 09:51

## 2018-06-03 RX ADMIN — FUROSEMIDE 20 MG: 20 TABLET ORAL at 09:51

## 2018-06-03 RX ADMIN — CLOPIDOGREL 75 MG: 75 TABLET, FILM COATED ORAL at 09:51

## 2018-06-03 RX ADMIN — METOPROLOL TARTRATE 50 MG: 50 TABLET ORAL at 09:51

## 2018-06-03 NOTE — DISCHARGE INSTRUCTIONS
Patient will discharge to [facility] today, via the [facility], around 13:00.  [facility] phone number is [phone].

## 2018-06-03 NOTE — PLAN OF CARE
Problem: Patient Care Overview  Goal: Plan of Care/Patient Progress Review  VSS. Alert & oriented. Tele: SD w/ BBB; HR 60s. Room air. Ax1 w/ cane. Denies pain. Plan: discharge to TCU today. Will continue to monitor.

## 2018-06-03 NOTE — DISCHARGE SUMMARY
Discharge Summary  Hospitalist    Date of Admission:  6/1/2018  Date of Discharge:  6/3/2018  Discharging Provider: Pat Garcia MD    Primary Care Physician   Matt Morrissey  Primary Care Provider Phone Number: [phone]  Primary Care Provider Fax Number: [phone]    PRINCIPAL DIAGNOSIS  Chest discomfort in the setting of severe 3-vessel disease, status post recent stents to left anterior descending and left circumflex.     Ischemic cardiomyopathy EF 35 - 40 %  Dyspnea on exertion.   Uncontrolled diabetes mellitus:  A1c is 9.1.  Acute on chronic mild normocytic anemia with GABBY.    Deconditioned from acute illness/chronic debility/age.  Recent SIRS syndrome, resolved    Past Medical History:   Diagnosis Date     Aortic root dilatation (H)      Aortic stenosis      Benign essential hypertension 1/6/2017     Benign non-nodular prostatic hyperplasia with lower urinary tract symptoms 10/20/2016     CAD (coronary artery disease)     MI 1970     Cardiomyopathy (H)      Carotid artery stenosis 10/20/2016    chronically occluded left internal carotid artery     Carotid occlusion, left      Coronary artery disease involving native coronary artery of native heart without angina pectoris 10/20/2016     Diabetes mellitus (H)      DJD (degenerative joint disease)      Gastro-oesophageal reflux disease      Gastroesophageal reflux disease without esophagitis 10/20/2016     Heart attack      Hyperlipidemia      Hypertension      Mild cognitive impairment 10/20/2016     Nephrolithiasis     RECURRENT     Osteopenia      PAD (peripheral artery disease) (H)     peripheral angiogram 5/2017: The common iliac artery stenoses were stented and the superficial femoral artery stenoses were angioplastied     Paroxysmal atrial fibrillation (H)      PONV (postoperative nausea and vomiting)      RBBB with left anterior fascicular block      Unspecified cerebral artery occlusion with cerebral infarction        History of Present  Illness   Dustin Pearce is an 89 year old male who presented with chest discomfort    Hospital Course   Dustin Pearce is a 89 year old male medical history significant for diabetes, hypertension, dyslipidemia, anxiety, depression, prior transient ischemic attack with MCI, chronic systolic congestive heart failure, severe 3-vessel coronary artery disease with recent drug-eluding stent to proximal left anterior descending and proximal left circumflex re admitted to the hospital on 6/1/2018 for chest discomfort.      Chest discomfort in the setting of severe 3-vessel disease, status post recent stents to left anterior descending and left circumflex.    Admitted from 05/26 to 05/31/2018 with non-ST elevation MI.  Cardiac catheterization on 05/30/2018 with severe 3-vessel disease including  of RCA, severe proximal LAD and severe proximal left circumflex disease.   Underwent drug-eluting stents to LAD and left circumflex and discharged on 5/31/2018 in the evening. Immediately after discharge patient started having chest pressure. History contradictory as patient's says the symptoms have been going for few days whereas wife states that the patient started complaining of symptoms only after discharged home. Associated shortness of breath on minimal exertion.      Pro BNP elevated at 10,184.  Troponin elevated at 0.133 > 0.093 > 0.093  Glucose 224.  WBC of 7.5, hemoglobin 10.5, platelet 155.    Chest x-ray  shows no acute infiltrate, effusion or pneumothorax.  Per Radiology read, there is stable subtle opacification of left lateral lung base, likely atelectasis or scarring.    EKG shows normal sinus rhythm with frequent PVCs and bifascicular block.     Continued PTA aspirin 81 mg daily/Plavix 75 mg daily  Continued PTA 40 mg Lipitor at bedtime.  Sublingual nitroglycerin p.r.n.    telemetry monitoring showed no acute events.  Patient continued to have chest discomfort, states 1/10 in intensity, cardiology recommended to  increase dose of imdur from 30 mg > 60 milligrams daily and follow-up as outpatient.     Ischemic cardiomyopathy.  Dyspnea on exertion.  Patient had shortness of breath on minimal exertion. Pro BNP elevated at 10 184  Echocardiogram 5/27/2018 showed LV EF around 35 - 40% with moderate to severe inferolateral wall hypokinesis and moderate inferior wall hypokinesis. Mild to moderate mitral valve/aortic valve stenosis. Mild aortic root dilation  Continued PTA metoprolol 50 mg two times a day and 5 mg lisinopril daily.  Diuresis with 20 mg oral Lasix BID and switch to 20 mg oral Lasix every other day to be given if systolic blood pressure greater than 100 and no signs of dehydration. Monitor renal function/electrolytes on PCP visit within seven days.  Daily weights, monitor blood pressure and heart rate daily and review on provider visit.     Prior TIA:   Plavix was added to aspirin in 12/2017.   No focal weakness at this time.  CT imaging of the head 5/26/2018 no acute pathology.      Uncontrolled diabetes mellitus:  A1c is 9.1.  Metformin  was held during hospitalization, had recent SIRS/abdominal discomfort with CAD and hence discontinued.  Continue PTA glipizide.  Was on insulin sliding scale, blood sugars running between 152 to 250 during hospitalization.  Discharge on 10 units insulin Lantus at bedtime, needs further optimization of diabetic medication prior to discharge from rehab.  Had nutrition assessment during last admission, can be followed up as outpatient.  Carbohydrate controlled diet.      Acute on chronic mild normocytic anemia with iron deficiency  Baseline hemoglobin between 11 to 12.  Presented with a hemoglobin of 10.5.  TSH 4.4, serum iron 27, iron saturation index nine.  Started on oral ferrous gluconate supplements at the time of discharge.  Consider further iron deficiency anemia workup as outpatient if continues to have drop in hemoglobin.  Will follow vitamin B12, folic acid  levels      Deconditioned from acute illness/chronic debility/age.  Patient is 89 years old with multiple medical problems, living at home with his wife.   Minimal assistance from family.  Has been readmitted to the hospital in less than 24 hours after discharge.  Physical therapy/occupational therapy recommended TCU for rehabilitation. Patient and his wife involved in care and agree with discharge to TCU.    Hypertension.   Continue PTA lisinopril/metoprolol.  Diuresis with Lasix every other day  PRN IV hydralazine ordered.      Dyslipidemia.    Continue PTA atorvastatin     Gastroesophageal reflux disease.    Continue PTA Prilosec.      BPH.    Continue PTA Flomax.      Chronic depression.    Continue PTA Cymbalta.      Recent SIRS syndrome, resolved:  On last admission on 5/26/2018 patient's BP 89/50 was improved to 140/71 with IV fluid bolus. He was also hypoxic with the lactic acid of 4.8. Pro calcitonin was negative, blood cultures and urine cultures show no growth. Was treated with empirical antibiotics during hospitalization.  No signs or symptoms of infection at this time.  CT chest 5/26/2000 and PE negative for acute pathology as is CXR.   EKG shows NSR with ST segment depressions in V3-4.  CT abdomen and pelvis 5/27/2018: No cause of acute pain identified in the abdomen or pelvis. Colonic diverticulosis without evidence of diverticulitis    Goals of care  discuss with patient and his wife, would like to be a full code at this time. Will need to revisit long-term goals of care given patient's age/multiple medical comorbid.    # Discharge Pain Plan:   - Patient currently has NO PAIN and is not being prescribed pain medications on discharge.  Patient, his wife and interdisciplinary team involved in care and agree with discharge plan.    Pat Garcia MD    Pending Results   These results will be followed up by ordering provider  Unresulted Labs Ordered in the Past 30 Days of this Admission     Date and  Time Order Name Status Description    6/2/2018 1532 Folate In process     6/2/2018 1532 Vitamin B12 In process         Physical Exam   Vitals:    06/01/18 1400 06/02/18 0600 06/03/18 0204   Weight: 72.7 kg (160 lb 3.2 oz) 71.6 kg (157 lb 14.4 oz) 71.8 kg (158 lb 6.4 oz)     Vital Signs with Ranges  Temp:  [97.9  F (36.6  C)-98.4  F (36.9  C)] 98.1  F (36.7  C)  Heart Rate:  [51-70] 70  Resp:  [18] 18  BP: ()/(39-67) 93/39  SpO2:  [92 %-95 %] 92 %  I/O last 3 completed shifts:  In: 270 [P.O.:270]  Out: 600 [Urine:600]  PHYSICAL EXAM  GENERAL: Patient is in no distress. Alert and oriented to simple commands  HEART: Regular rate and rhythm. S1S2. Systolic murmur right sternal area  LUNGS: bilateral decreased breath sounds, no crackles no wheezing.  ABDOMEN: Soft, no abdominal tenderness, bowel sounds heard   NEURO: moving all extremities  EXTREMITIES: trace pedal edema. 2+ peripheral pulses.  SKIN: Warm, dry. No rash or bruising.     )Consultations This Hospital Stay   CARDIOLOGY IP CONSULT  PHYSICAL THERAPY ADULT IP CONSULT  OCCUPATIONAL THERAPY ADULT IP CONSULT  SOCIAL WORK IP CONSULT  CARE COORDINATOR IP CONSULT  PHYSICAL THERAPY ADULT IP CONSULT  OCCUPATIONAL THERAPY ADULT IP CONSULT    Time Spent on this Encounter   IPat, personally saw the patient today and spent greater than 30 minutes discharging this patient.  More than 50% of time spent in direct patient care, care coordination, patient/caregiver counseling, and formalizing plan of care.    Discharge Orders     General info for SNF   Length of Stay Estimate: Short Term Care: Estimated # of Days <30  Condition at Discharge: Improving  Level of care:skilled   Rehabilitation Potential: Good  Admission H&P remains valid and up-to-date: Yes  Recent Chemotherapy: N/A  Use Nursing Home Standing Orders: Yes     Mantoux instructions   Give two-step Mantoux (PPD) Per Facility Policy Yes     Reason for your hospital stay   You were re admitted to  the hospital with chest discomfort.    Chest discomfort in the setting of severe 3-vessel disease, status post recent stents to left anterior descending and left circumflex.     Ischemic cardiomyopathy EF 35 - 40 %  Dyspnea on exertion.   Uncontrolled diabetes mellitus:  A1c is 9.1.  Acute on chronic mild normocytic anemia with GABBY.    Deconditioned from acute illness/chronic debility/age.  Recent SIRS syndrome, resolved     Glucose monitor nursing POCT   Before meals and at bedtime. Inform provider if blood sugars greater than 300.     Daily weights   Call Provider for weight gain of more than 2 pounds per day or 5 pounds per week.     Follow Up and recommended labs and tests   Follow up with [facility] physician.  The following labs/tests are recommended: BMP in 5 - 7 days.     Additional Discharge Instructions   Aggressive incentives spirometry.  Monitor blood sugars two times a day, optimize diabetic regimen at the time of discharge [has uncontrolled diabetes mellitus with hemoglobin A1c of 9, used to be on oral metformin, had recent SIRS/abdominal discomfort with CAD and hence discontinued)  Follow iron studies/hemoglobin level in 8 to 10 weeks.  Follow-up with cardiology as per schedule  Discuss long-term goals of care on providers/PCP visit.     Activity - Up with nursing assistance     Full Code     Physical Therapy Adult Consult   Evaluate and treat as clinically indicated.    Reason: physical deconditioning     Occupational Therapy Adult Consult   Evaluate and treat as clinically indicated.    Reason: physical deconditioning     Fall precautions     Advance Diet as Tolerated   Follow this diet upon discharge: Orders Placed This Encounter  low-carb,  2 gm NA Diet; Low Saturated Fat Diet         Discharge Medications   Current Discharge Medication List      START taking these medications    Details   ferrous gluconate (FERGON) 324 (38 Fe) MG tablet Take 1 tablet (324 mg) by mouth daily (with  breakfast)  Qty: 100 tablet, Refills: 0    Associated Diagnoses: Iron deficiency anemia, unspecified iron deficiency anemia type      furosemide (LASIX) 20 MG tablet Take 1 tablet (20 mg) by mouth every other day Hold if systolic bp < 100 or patients appears dehydrated or having poor oral intake  Qty: 30 tablet, Refills: 0    Associated Diagnoses: Coronary artery disease involving native heart, angina presence unspecified, unspecified vessel or lesion type      insulin glargine (LANTUS SOLOSTAR) 100 UNIT/ML pen Inject 14 Units Subcutaneous At Bedtime  Qty: 10 mL, Refills: 0    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)         CONTINUE these medications which have CHANGED    Details   isosorbide mononitrate (IMDUR) 30 MG 24 hr tablet Take 2 tablets (60 mg) by mouth daily  Qty: 60 tablet, Refills: 0    Associated Diagnoses: Coronary artery disease involving native heart, angina presence unspecified, unspecified vessel or lesion type         CONTINUE these medications which have NOT CHANGED    Details   AMITRIPTYLINE HCL PO Take 25 mg by mouth nightly as needed for sleep      aspirin EC 81 MG EC tablet Take 1 tablet (81 mg) by mouth daily  Qty: 30 tablet, Refills: 0    Associated Diagnoses: Transient cerebral ischemia, unspecified type      ATORVASTATIN CALCIUM PO Take 40 mg by mouth At Bedtime       bisacodyl (DULCOLAX) 10 MG Suppository Place 10 mg rectally daily as needed for constipation      Cholecalciferol 52545 UNITS TABS Take 1 tablet by mouth three times a week (Mon, Wed, Fri)      Clopidogrel Bisulfate, [id number], (PLAVIX PO) Take 75 mg by mouth daily     Associated Diagnoses: Cough      DULoxetine (CYMBALTA) 30 MG EC capsule Take 1 capsule (30 mg) by mouth daily  Qty: 90 capsule, Refills: 3    Associated Diagnoses: Polyneuropathy associated with underlying disease (H)      glipiZIDE (GLUCOTROL) 10 MG tablet Take 1 tablet (10 mg) by mouth 2 times daily (before meals)  Qty: 180  tablet, Refills: 3    Associated Diagnoses: Type 2 diabetes mellitus with diabetic peripheral angiopathy without gangrene, without long-term current use of insulin (H)      ipratropium (ATROVENT) 0.03 % spray INHALE 1 SPRAY IN EACH NOSTRIL EVERY 12 HOURS AS NEEDED  Qty: 30 mL, Refills: 0    Associated Diagnoses: Runny nose      lisinopril (PRINIVIL/ZESTRIL) 5 MG tablet Take 1 tablet (5 mg) by mouth daily  Qty: 30 tablet, Refills: 1    Associated Diagnoses: Coronary artery disease involving native heart, angina presence unspecified, unspecified vessel or lesion type      magnesium oxide (MAG-OX) 400 (241.3 Mg) MG tablet TAKE 1 TABLET BY MOUTH TWICE DAILY  Qty: 60 tablet, Refills: 0    Associated Diagnoses: Constipation, unspecified constipation type      metoprolol tartrate (LOPRESSOR) 50 MG tablet Take 1 tablet (50 mg) by mouth 2 times daily  Qty: 60 tablet, Refills: 1    Associated Diagnoses: Coronary artery disease involving native heart, angina presence unspecified, unspecified vessel or lesion type      nitroGLYcerin (NITROSTAT) 0.4 MG sublingual tablet For chest pain place 1 tablet under the tongue every 5 minutes for 3 doses. If symptoms persist 5 minutes after 1st dose call 911.  Qty: 25 tablet, Refills: 0    Associated Diagnoses: Coronary artery disease involving native heart, angina presence unspecified, unspecified vessel or lesion type      OMEPRAZOLE PO Take 40 mg by mouth 2 times daily       polyethylene glycol (MIRALAX/GLYCOLAX) Packet Take 17 g by mouth daily as needed for constipation      tamsulosin (FLOMAX) 0.4 MG capsule Take 1 capsule (0.4 mg) by mouth daily  Qty: 90 capsule, Refills: 3    Associated Diagnoses: Benign non-nodular prostatic hyperplasia with lower urinary tract symptoms      order for DME Equipment being ordered: True Matrix Blood Glucose meter.  Qty: 1 Device, Refills: 0    Associated Diagnoses: Type 2 diabetes mellitus without complication (H)      TRUE METRIX BLOOD GLUCOSE TEST  "test strip USE TO TEST BLOOD SUGAR THREE TIMES DAILY OR AS DIRECTED  Qty: 300 strip, Refills: 0    Associated Diagnoses: Hypoglycemia         STOP taking these medications       METFORMIN HCL PO Comments:   Reason for Stopping:             Allergies   Allergies   Allergen Reactions     Oxycodone Other (See Comments)     \"TERRIBLE SWEATING\"     Sulfa Drugs      Tetracycline        Discharge Disposition   Discharged to short-term care facility  Condition at discharge: Good    DATA  Most Recent 3 CBC's:  Recent Labs   Lab Test  06/03/18   0520  06/01/18   0810  05/31/18   0600  05/30/18   0540   WBC   --   7.5  8.5  9.1   HGB  9.8*  10.5*  10.5*  11.0*   MCV   --   91  91  90   PLT   --   155  148*  166      Most Recent 3 BMP's:  Recent Labs   Lab Test  06/03/18   0520  06/01/18   0810  05/31/18   0600   NA  139  142  139   POTASSIUM  3.8  4.1  4.3   CHLORIDE  105  106  105   CO2  27  29  30   BUN  13  15  10   CR  0.82  0.70  0.81   ANIONGAP  7  7  4   ALLISON  8.4*  9.1  8.6   GLC  145*  224*  200*     Most Recent 2 LFT's:  Recent Labs   Lab Test  06/01/18   0810  05/27/18   0710   AST  15  18   ALT  14  15   ALKPHOS  67  60   BILITOTAL  0.6  0.3     Most Recent 3 Troponin's:  Recent Labs   Lab Test  06/01/18   1940  06/01/18   1605  06/01/18   1015   05/27/18   0119   TROPI  0.093*  0.093*  0.133*   < >   --    TROPONIN   --    --    --    --   0.00    < > = values in this interval not displayed.     Most Recent 6 Bacteria Isolates From Any Culture (See EPIC Reports for Culture Details):  Recent Labs   Lab Test  05/26/18   2246  05/26/18   2135  03/12/18   1454  02/08/18   0218  06/06/17   1846  06/04/17   1027   CULT  No growth  No growth  No growth  >100,000 colonies/mL  Enterococcus faecalis  *  >100,000 colonies/mL  Enterococcus faecalis  *  >100,000 colonies/mL  Strain 2  Enterococcus faecalis  *  No anaerobes isolated  No growth  No anaerobes isolated  Moderate growth Staphylococcus aureus*     Most Recent " TSH, T4 and A1c Labs:  Recent Labs   Lab Test  06/03/18   0520  05/27/18   0710   TSH  4.45*   --    T4  0.98   --    A1C   --   9.1*     Results for orders placed or performed during the hospital encounter of 06/01/18   XR Chest 2 Views    Narrative    XR CHEST 2 VW 6/1/2018 8:22 AM    HISTORY: Pain.    COMPARISON: May 26, 2018.      Impression    IMPRESSION: Stable subtle opacification of the left lateral lung base,  likely atelectasis or scarring as before. No new focal pulmonary  opacities otherwise. No pleural effusions or pneumothorax. Stable  heart and mediastinum.    GALDINO LANDERS MD

## 2018-06-03 NOTE — CONSULTS
Care Transition Initial Assessment -   Reason For Consult: discharge planning  Met with: Patient and Family    Active Problems:    Chest discomfort       DATA  Lives With: spouse  Living Arrangements: house  Description of Support System: Supportive, Involved  Who is your support system?: Wife  Support Assessment: Adequate family and caregiver support.   Identified issues/concerns regarding health management:       Quality Of Family Relationships: supportive, involved  Transportation Available: family or friend will provide    Per social service protocol for discharge planning.  Patient was admitted on 6-1-18 with chest discomfort.  The tentative date of discharge is 6-3-18.  Patient is admitted under observation status.  Reviewed chart and spoke with patient and significant other regarding discharge plans.  Per patient and significant other report, they live in a house with 3 steps to enter and 7 steps inside.  Patient uses a straight cane at baseline.  Patient has a shower chair and a raised toilet seat in the bathroom.  Patient is independent with ADL's.  Patient's significant other assists with IADL's.  Reviewed the therapy discharge recommendations of tcu placement on discharge and patient and significant other are in agreement.  Patient had a recent admission and has an inpatient hospital stay from May 27 - May 31 therefore his tcu stay will be covered under Medicare.  Patient's significant other would like a referral sent to [facility].  Explained that I spoke with [facility] earlier in the day and they have no available beds.  Patient is now asking for a referral to be sent to [facility].  Patient's significant other is asking for a private room.  Explained that there is a $39 per day amenity fee and patient and significant other are in agreement.  Referral sent, vis discharge on the double, to check bed availability.  Received a call back from [facility].  They can accept patient today into a private  room.  They would like transport arranged for around 13:00 today.      ASSESSMENT  Cognitive Status:  awake and alert  Concerns to be addressed: discharge planning.     PLAN  Financial costs for the patient includes Private room amenity fees.  Patient given options and choices for discharge TCU choices.  Patient/family is agreeable to the plan?  Yes  Patient Goals and Preferences: TCU on discharge.  Patient anticipates discharging to:  [facility].    Will continue to follow and assist with a safe discharge plan.    ESAU Aparicio, [facility]  [phone]

## 2018-06-03 NOTE — PROGRESS NOTES
[facility]  Cardiology Progress Note    Outpatient cardiologist: Dr. Samuel.    Date of Service (when I saw the patient): 06/03/2018    Summary: Dustin Pearce is a 89 year old male with history of chronic systolic CHF, moderate AS, DM, PA s/p LE angioplasty/stenting, paroxysmal atrial fibrillation, HTN, dyslipidemia who was admitted on 5/26/2018 with dizziness, syncope, n/v, and chest discomfort after vomiting. CT was negative for PE. Cardiology was consulted due to troponin elevated. Initially he was managed conservatively due to lack of recurrent symptoms and the presumption that his underlying illness caused a small, demand infarct. He had recurrent chest discomfort on 5/28 associated with recurrent anterolateral ST depression and dynamic inferior T wave changes. Troponin increased from 0.5 to 0.7. Cardiology was therefore asked to reevaluation.      Interval History   Continues to do well ,continues to have constant 2/10 chest pain, substernal/epigastric, atypical, not associated with exertion. Would like to go home. Doing well with walks.      Assessment & Plan        Readmitted with chest pain. Unclear etiology. With continued pain overnight would recommnend monitoring for another 24 hours. Additionally ECG shows ST depressions and T wave inversions in the inferolateral region that is different from ECG yestrday. Will continue to increased Imdur as tolerated. Plan per Dr. Emerson was to discharge given likely noncardiac pain. Given ECG will monitor for another 24 hours.         1.  CAD. Coronary angiogram done due to recurrent chest discomfort associated with EKG changes with ST delevation.   -  Angiogram showed severe disease in the proximal LAD and LCx. The RCA is occluded with large left to right collaterals.   -  S/p stenting of the LAD and Lcx with good results. He has mild to moderate diffuse disease which remains.   -  Continue aspirin and plavix, statin, and Toprolol. Imdur started 5/29  and Toprolol dose increased.  Lisinopril resumed 5/30.  2.  Ischemic cardiomyopathy. LVEF has historically been around 35 - 40%. Repeat echocardiogram shows stable LVEF with moderate to severe inferolateral wall hypokinesis and moderate inferior wall hypokinesis.   -  Continue beta blocker, lisinopril. Has not require diuretic therapy.   2.  SIRS syndrome. No clear source of infection identified.   3.  Anemia. Continue to monitor.   4.  PAD.  5.  Chronic systolic congestive heart failure.   6.  Hypertension.   7.  History of CVA.  8.  Mild to moderate aortic stenosis.             Plan:  1.  Continue aspirin and plavix for dual antiplatelet therapy.    2.  On appropriate medications including metoprolol and lisinopril. Can titrate these up as needed for BP control.   3.  He has a podiatry appointment next Friday June 8th and wife would like to follow up that day to make it easier on their schedule - scheduled appointment with ASPEN Whitten on 6/8/18 at 12:30 pm. Also scheduled an appointment with Dr. Samuel on 8/31/18 at 10:45 pm.   4.  Plan for hopefully home later today   5. Increase Imdur as needed        Lucie Soto MD    Physical Exam   Temp: 98.4  F (36.9  C) Temp src: Oral BP: 113/67 Pulse: 60 Heart Rate: 63 Resp: 18 SpO2: 95 % O2 Device: None (Room air) Oxygen Delivery: 3 LPM  Vitals:    06/01/18 0802 06/01/18 1400 06/02/18 0600 06/03/18 0204   Weight: 72.6 kg (160 lb) 72.7 kg (160 lb 3.2 oz) 71.6 kg (157 lb 14.4 oz) 71.8 kg (158 lb 6.4 oz)     Vital Signs with Ranges  Temp:  [97.9  F (36.6  C)-98.4  F (36.9  C)] 98.4  F (36.9  C)  Pulse:  [60-61] 60  Heart Rate:  [51-69] 63  Resp:  [18] 18  BP: ()/(41-67) 113/67  SpO2:  [92 %-99 %] 95 %  05/29 0700 - 06/03 0659  In: 620 [P.O.:620]  Out: 1575 [Urine:1575]  Net: -955    Constitutional: Sleeping.  Heart: RRR  Lungs: clear  Abd: soft  Extremities: no edema    Data   Results for orders placed or performed during the hospital encounter of 06/01/18 (from  the past 24 hour(s))   Glucose by meter   Result Value Ref Range    Glucose 173 (H) 70 - 99 mg/dL   EKG 12-lead, tracing only   Result Value Ref Range    Interpretation ECG Click View Image link to view waveform and result    Glucose by meter   Result Value Ref Range    Glucose 298 (H) 70 - 99 mg/dL   Glucose by meter   Result Value Ref Range    Glucose 170 (H) 70 - 99 mg/dL   Glucose by meter   Result Value Ref Range    Glucose 332 (H) 70 - 99 mg/dL   Glucose by meter   Result Value Ref Range    Glucose 176 (H) 70 - 99 mg/dL   Basic metabolic panel   Result Value Ref Range    Sodium 139 133 - 144 mmol/L    Potassium 3.8 3.4 - 5.3 mmol/L    Chloride 105 94 - 109 mmol/L    Carbon Dioxide 27 20 - 32 mmol/L    Anion Gap 7 3 - 14 mmol/L    Glucose 145 (H) 70 - 99 mg/dL    Urea Nitrogen 13 7 - 30 mg/dL    Creatinine 0.82 0.66 - 1.25 mg/dL    GFR Estimate 88 >60 mL/min/1.7m2    GFR Estimate If Black >90 >60 mL/min/1.7m2    Calcium 8.4 (L) 8.5 - 10.1 mg/dL   Iron and iron binding capacity   Result Value Ref Range    Iron 27 (L) 35 - 180 ug/dL    Iron Binding Cap 287 240 - 430 ug/dL    Iron Saturation Index 9 (L) 15 - 46 %   Hemoglobin   Result Value Ref Range    Hemoglobin 9.8 (L) 13.3 - 17.7 g/dL   CK total   Result Value Ref Range    CK Total 41 30 - 300 U/L   TSH with free T4 reflex   Result Value Ref Range    TSH 4.45 (H) 0.40 - 4.00 mU/L   T4 free   Result Value Ref Range    T4 Free 0.98 0.76 - 1.46 ng/dL     Echocardiogram 5/27/18:  Interpretation Summary  The visual ejection fraction is estimated at 35-40%.  There is moderate to severe inferolateral wall hypokinesis.  There is moderate inferior wall hypokinesis.  The right ventricle is normal in size and function.  The left atrium is mildly dilated.  There is mild mitral stenosis.  Mild to moderate valvular aortic stenosis.  Mild aortic root dilatation.     Tele NSR with BBB    Medications       aspirin  81 mg Oral Daily     atorvastatin (LIPITOR) tablet 40 mg  40  mg Oral At Bedtime     clopidogrel (PLAVIX) tablet 75 mg  75 mg Oral Daily     DULoxetine  30 mg Oral Daily     furosemide  20 mg Oral BID     glipiZIDE  10 mg Oral BID AC     insulin aspart  1-7 Units Subcutaneous TID AC     insulin aspart  1-5 Units Subcutaneous At Bedtime     isosorbide mononitrate  60 mg Oral Daily     lisinopril  5 mg Oral Daily     metoprolol tartrate  50 mg Oral BID     omeprazole (priLOSEC) CR capsule 40 mg  40 mg Oral BID     sodium chloride (PF)  3 mL Intracatheter Q8H     tamsulosin  0.4 mg Oral Daily     Discussed w Dr. Hall

## 2018-06-03 NOTE — PROGRESS NOTES
SW:  D:  Received discharge orders for patient.  Bed available at [facility] for today.  Patient will transport, via the skyway, around 13:00 today.  Patient and significant other informed of the plan and in agreement to the plan.  Call placed to update [facility] and faxed the orders and the PAS.  Also requested that they have a lunch tray for patient as he at a late breakfast.  Per Yonathan, he can have a lunch tray for patient.    PAS-RR    D: Per DHS regulation, SW completed and submitted PAS-RR to [place] [employer] Direct Connect via the Senior LinkAge Line.  PAS-RR confirmation # is : [id number].    I: SW spoke with patient and significant other and they are aware a PAS-RR has been submitted.  SW reviewed with patient and significant other that they may be contacted for a follow up appointment within 10 days of hospital discharge if their SNF stay is < 30 days.  Contact information for [facility] Line was also provided.    A: Patient and significant other verbalized understanding.    P: Further questions may be directed to [facility] LinkAge Line at #[phone], option #4 for PAS-RR staff.

## 2018-06-04 ENCOUNTER — TRANSFERRED RECORDS (OUTPATIENT)
Dept: HEALTH INFORMATION MANAGEMENT | Facility: CLINIC | Age: 83
End: 2018-06-04

## 2018-06-04 ENCOUNTER — TELEPHONE (OUTPATIENT)
Dept: FAMILY MEDICINE | Facility: CLINIC | Age: 83
End: 2018-06-04

## 2018-06-04 ENCOUNTER — NURSING HOME VISIT (OUTPATIENT)
Dept: GERIATRICS | Facility: CLINIC | Age: 83
End: 2018-06-04
Payer: MEDICARE

## 2018-06-04 ENCOUNTER — PATIENT OUTREACH (OUTPATIENT)
Dept: CARE COORDINATION | Facility: CLINIC | Age: 83
End: 2018-06-04

## 2018-06-04 VITALS
DIASTOLIC BLOOD PRESSURE: 55 MMHG | HEIGHT: 70 IN | SYSTOLIC BLOOD PRESSURE: 95 MMHG | HEART RATE: 59 BPM | TEMPERATURE: 98.4 F | RESPIRATION RATE: 16 BRPM | OXYGEN SATURATION: 95 % | WEIGHT: 161.6 LBS | BODY MASS INDEX: 23.13 KG/M2

## 2018-06-04 DIAGNOSIS — R41.89 COGNITIVE IMPAIRMENT: ICD-10-CM

## 2018-06-04 DIAGNOSIS — N40.0 BENIGN PROSTATIC HYPERPLASIA WITHOUT LOWER URINARY TRACT SYMPTOMS: ICD-10-CM

## 2018-06-04 DIAGNOSIS — D64.9 ANEMIA, UNSPECIFIED TYPE: ICD-10-CM

## 2018-06-04 DIAGNOSIS — R53.81 PHYSICAL DECONDITIONING: ICD-10-CM

## 2018-06-04 DIAGNOSIS — I50.23 ACUTE ON CHRONIC SYSTOLIC CONGESTIVE HEART FAILURE (H): ICD-10-CM

## 2018-06-04 DIAGNOSIS — I25.10 CORONARY ARTERY DISEASE INVOLVING NATIVE CORONARY ARTERY OF NATIVE HEART WITHOUT ANGINA PECTORIS: ICD-10-CM

## 2018-06-04 DIAGNOSIS — I21.4 NSTEMI (NON-ST ELEVATED MYOCARDIAL INFARCTION) (H): Primary | ICD-10-CM

## 2018-06-04 DIAGNOSIS — E16.2 HYPOGLYCEMIA: ICD-10-CM

## 2018-06-04 DIAGNOSIS — I25.5 ISCHEMIC CARDIOMYOPATHY: ICD-10-CM

## 2018-06-04 DIAGNOSIS — I48.0 PAROXYSMAL ATRIAL FIBRILLATION (H): ICD-10-CM

## 2018-06-04 DIAGNOSIS — E11.42 DM TYPE 2 WITH DIABETIC PERIPHERAL NEUROPATHY (H): ICD-10-CM

## 2018-06-04 DIAGNOSIS — I10 BENIGN ESSENTIAL HYPERTENSION: ICD-10-CM

## 2018-06-04 LAB
ANION GAP SERPL CALCULATED.3IONS-SCNC: 8 MMOL/L (ref 3–14)
BUN SERPL-MCNC: 19 MG/DL (ref 7–30)
CALCIUM SERPL-MCNC: 8.7 MG/DL (ref 8.5–10.1)
CHLORIDE SERPLBLD-SCNC: 103 MMOL/L (ref 94–109)
CO2 SERPL-SCNC: 27 MMOL/L (ref 20–32)
CREAT SERPL-MCNC: 1.17 MG/DL (ref 0.66–1.25)
ERYTHROCYTE [DISTWIDTH] IN BLOOD BY AUTOMATED COUNT: 14.8 % (ref 10–15)
GFR SERPL CREATININE-BSD FRML MDRD: 59 ML/MIN/1.73M2
GLUCOSE SERPL-MCNC: 247 MG/DL (ref 70–99)
HCT VFR BLD AUTO: 3.5 % (ref 40–53)
HEMOGLOBIN: 9.8 G/DL (ref 13.3–17.7)
MCH RBC QN AUTO: 29 PG (ref 26.5–33)
MCHC RBC AUTO-ENTMCNC: 31.1 G/DL (ref 31.5–36.5)
MCV RBC AUTO: 93 FL (ref 78–100)
PLATELET # BLD AUTO: 180 10E9/L (ref 150–450)
POTASSIUM SERPL-SCNC: 4.6 MMOL/L (ref 3.4–5.3)
RBC # BLD AUTO: 3.38 10E12/L (ref 4.4–5.9)
SODIUM SERPL-SCNC: 138 MMOL/L (ref 133–144)
WBC # BLD AUTO: 7.5 10E9/L (ref 4–11)

## 2018-06-04 PROCEDURE — 99309 SBSQ NF CARE MODERATE MDM 30: CPT | Performed by: NURSE PRACTITIONER

## 2018-06-04 NOTE — PROGRESS NOTES
Clinic Care Coordination Contact  Care Coordination Transition Communication         Clinical Data: Patient was hospitalized at [facility] from 6/1/2018 to 6/3/2018 with diagnosis of Non-St Elevation MI.     Transition to Facility:              Facility Name: [facility]               Contact name and phone number/fax: Linda RangelHUBER tang [phone] and Fax: [phone]    Plan: RN/SW Care Coordinator will await notification from facility staff informing RN/SW Care Coordinator of patient's discharge plans/needs. RN/SW Care Coordinator will review chart and outreach to facility staff every 4 weeks and as needed.     Reina Ramirez RN  Clinic Care Coordinator  [phone]  [email or web address]

## 2018-06-04 NOTE — PROGRESS NOTES
[facility]  PRIMARY CARE PROVIDER AND CLINIC:  Matt Morrissey [address] / [place] [address]  Chief Complaint   Patient presents with     Hospital F/U     [place] Medical Record Number:  [id number]    HPI:    Dustin Pearce is a 89 year old  (1/31/1928),admitted to the [facility] from [facility].  Hospital stay 06/01/2018 through 06/03/2018.  Admitted to this facility for  rehab, medical management and nursing care.  HPI information obtained from: facility chart records, facility staff, patient report and [facility] chart review.    He has a medical history significant for afib, HTN, CHF, CAD, CVA in 1999, history of TIA on Plavix.   He was initially hospitalized 5/26-5/31/2018 with chest pain and NSTEMI. Cardiac cath 5/30/2018 showed severe 3 vessel disease and he underwent drug eluting stents. He discharged home with his SO 5/31/2018 and returned to the ED the next day with chest pressure and shortness of breath. BNP 10,184, troponin elevated. CXR with atelectasis left lung base, no acute process. EKG showed normal sinus rhythm with  PVCs and bifascicular block. Cardiology recommended increasing Imdur from 30 mg to 60 mg daily. ASA, Plavix, statin, metoprolol  and prn nitro continued. He was diuresed with lasix.    Lantus was started and metformin discontinued. Glipizide continued  ECHO 5/27: EF 35-40% with moderate to severe inferolateral wall hypokinesis, mild to moderate mitral and aortic valve stenosis.     .     Current issues are:          NSTEMI (non-ST elevated myocardial infarction) (H)-BP dropped to 70/40s and HR 34  today after taking am meds. Felt dizzy,weak and unsteady. CBC, BMP obtained and results are within his range. No s/s of bleeding. Reports shortness of breath with exertion and dry cough. Denies chest pain. Fluids pushed and SBP up to  in the afternoon. HR in the 50s.   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 Fairly good appetite.   Benign essential hypertension-BPs:  95/55, 139/60, 120/60  Anemia, unspecified type  Benign prostatic hyperplasia without lower urinary tract symptoms-on flomax. Denies urinary symptoms.   Cognitive impairment-poor historian. SLUMS 18/30. CPT in progress.   Physical deconditioning-easily fatigued. Ambulating short distances with walker and assist. Requires assist of 1 with cares.       CODE STATUS/ADVANCE DIRECTIVES DISCUSSION:   CPR/Full code   Patient's living condition: lives with SO    ALLERGIES:Oxycodone; Sulfa drugs; and Tetracycline  PAST MEDICAL HISTORY:  has a past medical history of Aortic root dilatation (H); Aortic stenosis; Benign essential hypertension (1/6/2017); Benign non-nodular prostatic hyperplasia with lower urinary tract symptoms (10/20/2016); CAD (coronary artery disease); Cardiomyopathy (H); Carotid artery stenosis (10/20/2016); Carotid occlusion, left; Coronary artery disease involving native coronary artery of native heart without angina pectoris (10/20/2016); Diabetes mellitus (H); DJD (degenerative joint disease); Gastro-oesophageal reflux disease; Gastroesophageal reflux disease without esophagitis (10/20/2016); Heart attack; Hyperlipidemia; Hypertension; Mild cognitive impairment (10/20/2016); Nephrolithiasis; Osteopenia; PAD (peripheral artery disease) (H); Paroxysmal atrial fibrillation (H); PONV (postoperative nausea and vomiting); RBBB with left anterior fascicular block; and Unspecified cerebral artery occlusion with cerebral infarction. He also has no past medical history of Difficult intubation or Malignant hyperthermia.  PAST SURGICAL HISTORY:  has a past surgical history that includes Cholecystectomy;  hernia repair; Abdomen surgery; Laser holmium lithotripsy ureter(s), insert stent, combined (3/2/2012); rotator cuff repair rt/lt; Esophagoscopy, gastroscopy, duodenoscopy (EGD), combined (N/A, 12/26/2016); UGI ENDOSCOPY W EUS (N/A, 12/29/2016); Amputate foot (Left, 6/4/2017); Incision and drainage foot, combined (Left, 6/6/2017); and Endarterectomy carotid (Right, 1/3/2018).  FAMILY HISTORY: family history includes Breast Cancer in his mother; Heart Failure (age of onset: 81) in his father.  SOCIAL HISTORY:  reports that he quit smoking about 19 years ago. His smoking use included Cigarettes. He has a 30.00 pack-year smoking history. He has never used smokeless tobacco. He reports that he drinks about 4.2 oz of alcohol per week  He reports that he does not use illicit drugs.    Post Discharge Medication Reconciliation Status: discharge medications reconciled and changed, per note/orders (see AVS).  Current Outpatient Prescriptions   Medication Sig Dispense Refill     AMITRIPTYLINE HCL PO Take 25 mg by mouth nightly as needed for sleep       aspirin EC 81 MG EC tablet Take 1 tablet (81 mg) by mouth daily 30 tablet 0     ATORVASTATIN CALCIUM PO Take 40 mg by mouth At Bedtime        bisacodyl (DULCOLAX) 10 MG Suppository Place 10 mg rectally daily as needed for constipation       Cholecalciferol 90164 UNITS TABS Take 1 tablet by mouth three times a week (Mon, Wed, Fri)       Clopidogrel Bisulfate, [id number], (PLAVIX PO) Take 75 mg by mouth daily        DULoxetine (CYMBALTA) 30 MG EC capsule Take 1 capsule (30 mg) by mouth daily 90 capsule 3     ferrous gluconate (FERGON) 324 (38 Fe) MG tablet Take 1 tablet (324 mg) by mouth daily (with breakfast) 100 tablet 0     furosemide (LASIX) 20 MG tablet Take 1 tablet (20 mg) by mouth every other day Hold if systolic bp < 100 or patients appears dehydrated or having poor oral intake 30 tablet 0     glipiZIDE (GLUCOTROL) 10 MG tablet Take 1 tablet (10 mg) by mouth 2 times daily  "(before meals) 180 tablet 3     insulin glargine (LANTUS SOLOSTAR) 100 UNIT/ML pen Inject 14 Units Subcutaneous At Bedtime 10 mL 0     ipratropium (ATROVENT) 0.03 % spray INHALE 1 SPRAY IN EACH NOSTRIL EVERY 12 HOURS AS NEEDED 30 mL 0     isosorbide mononitrate (IMDUR) 30 MG 24 hr tablet Take 2 tablets (60 mg) by mouth daily 60 tablet 0     lisinopril (PRINIVIL/ZESTRIL) 5 MG tablet Take 1 tablet (5 mg) by mouth daily 30 tablet 1     magnesium oxide (MAG-OX) 400 (241.3 Mg) MG tablet TAKE 1 TABLET BY MOUTH TWICE DAILY 60 tablet 0     metoprolol tartrate (LOPRESSOR) 50 MG tablet Take 1 tablet (50 mg) by mouth 2 times daily 60 tablet 1     nitroGLYcerin (NITROSTAT) 0.4 MG sublingual tablet For chest pain place 1 tablet under the tongue every 5 minutes for 3 doses. If symptoms persist 5 minutes after 1st dose call 911. 25 tablet 0     OMEPRAZOLE PO Take 40 mg by mouth 2 times daily        order for DME Equipment being ordered: True Matrix Blood Glucose meter. 1 Device 0     polyethylene glycol (MIRALAX/GLYCOLAX) Packet Take 17 g by mouth daily as needed for constipation       tamsulosin (FLOMAX) 0.4 MG capsule Take 1 capsule (0.4 mg) by mouth daily 90 capsule 3     blood glucose monitoring (TRUE METRIX BLOOD GLUCOSE TEST) test strip USE TO TEST BLOOD SUGAR THREE TIMES DAILY OR AS DIRECTED 300 strip 1       ROS:  10 point ROS of systems including Constitutional, Eyes, Respiratory, Cardiovascular, Gastroenterology, Genitourinary, Integumentary, Muscularskeletal, Psychiatric were all negative except for pertinent positives noted in my HPI.    Exam:  BP 95/55  Pulse 59  Temp 98.4  F (36.9  C)  Resp 16  Ht 5' 10\" (1.778 m)  Wt 161 lb 9.6 oz (73.3 kg)  SpO2 95%  BMI 23.19 kg/m2  GENERAL APPEARANCE:  Alert, in no distress, pale, chronically ill appearing  ENT:  Mouth and posterior oropharynx normal, moist mucous membranes, [place]  EYES:  EOM normal, conjunctiva and lids normal, PERRL  NECK:  No adenopathy,masses or " thyromegaly  RESP:  respiratory effort and palpation of chest normal, no respiratory distress, diminished breath sounds bilaterally, no crackles or wheezes  CV:  Palpation and auscultation of heart done , regular rate and rhythm, 2/6 murmur, no edema, +2 pedal pulses  ABDOMEN:  normal bowel sounds, soft, nontender, no hepatosplenomegaly or other masses  M/S:   gait unsteady. FERNANDEZ with good strength. No joint inflammation  SKIN:  no rashes or open areas  PSYCH:  oriented to self, place, situation, insight and judgement impaired, memory impaired , affect and mood normal    Lab/Diagnostic data:  Last Basic Metabolic Panel:  Last Basic Metabolic Panel:  Lab Results   Component Value Date     06/03/2018      Lab Results   Component Value Date    POTASSIUM 3.8 06/03/2018     Lab Results   Component Value Date    CHLORIDE 105 06/03/2018     Lab Results   Component Value Date    ALLISON 8.4 06/03/2018     Lab Results   Component Value Date    CO2 27 06/03/2018     Lab Results   Component Value Date    BUN 13 06/03/2018     Lab Results   Component Value Date    CR 0.82 06/03/2018     Lab Results   Component Value Date     06/03/2018     Lab Results   Component Value Date    WBC 7.5 06/01/2018     Lab Results   Component Value Date    RBC 3.58 06/01/2018     Lab Results   Component Value Date    HGB 9.8 06/03/2018     Lab Results   Component Value Date    HCT 32.7 06/01/2018     Lab Results   Component Value Date    MCV 91 06/01/2018     Lab Results   Component Value Date    MCH 29.3 06/01/2018     Lab Results   Component Value Date    MCHC 32.1 06/01/2018     Lab Results   Component Value Date    RDW 14.6 06/01/2018     Lab Results   Component Value Date     06/01/2018       ASSESSMENT / PLAN:  (I21.4) NSTEMI (non-ST elevated myocardial infarction) (H)  (primary encounter diagnosis)  (I25.10) Coronary artery disease involving native coronary artery of native heart without angina pectoris  Comment: s/p stent  placement. Hypotensive and not looking well earlier today, better now.   Plan: continue ASA, Plavix, statin, Imdur, metoprolol. Cardiology follow up as scheduled. Med adjustments as below.     (I50.23) Acute on chronic systolic congestive heart failure (H)  (I25.5) Ischemic cardiomyopathy  Comment: improved volume status.   Plan: continue lasix every other day-hold tomorrow's dose pending am VS and assessment for hypotension. Daily weight. Low sodium diet. [facility] 6/8/2018.     (I48.0) Paroxysmal atrial fibrillation (H)  Comment: rate controlled.   Plan: continue metoprolol. Anticoagulated with ASA and Plavix.     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   Plan: continue Lantus, glipizide for now. Monitor blood sugars. Will involve his SO in discharge planning re: med administration. Continue amitriptyline and duloxetine for neuropathy.     (I10) Benign essential hypertension  Comment: hypotensive today after am meds. BP improved later in the day.   Plan: hold pm dose of metoprolol today. Hold all am meds tomorrow, pending VS and assessment. Change flomax to HS.     (D64.9) Anemia, unspecified type  Comment: chronic,  likely multifactorial. No s/s of active bleeding  Plan: follow Hgb. Continue ferrous gluconate. Continue PPI.     (N40.0) Benign prostatic hyperplasia without lower urinary tract symptoms  Comment: chronic  Plan: continue flomax, but give at HS. Monitor for symptoms.     (R41.89) Cognitive impairment  Comment: appears to have moderate deficits.His poor vision may affect cognitive scores  Plan: cognitive testing per therapies. Supportive care.     (R53.81) Physical deconditioning  Comment: due to acute illness, hospitalization, multiple comorbidities  Plan: PHYSICAL THERAPY/OT. Goal is to return home with his SO and services.         Electronically signed by:  Alexander LOUISE  CNP

## 2018-06-05 ENCOUNTER — NURSING HOME VISIT (OUTPATIENT)
Dept: GERIATRICS | Facility: CLINIC | Age: 83
End: 2018-06-05
Payer: MEDICARE

## 2018-06-05 VITALS
WEIGHT: 160.4 LBS | HEART RATE: 66 BPM | DIASTOLIC BLOOD PRESSURE: 59 MMHG | TEMPERATURE: 97.3 F | BODY MASS INDEX: 22.96 KG/M2 | RESPIRATION RATE: 25 BRPM | SYSTOLIC BLOOD PRESSURE: 105 MMHG | OXYGEN SATURATION: 95 % | HEIGHT: 70 IN

## 2018-06-05 DIAGNOSIS — I95.2 HYPOTENSION DUE TO DRUGS: ICD-10-CM

## 2018-06-05 DIAGNOSIS — I21.4 NSTEMI (NON-ST ELEVATED MYOCARDIAL INFARCTION) (H): Primary | ICD-10-CM

## 2018-06-05 DIAGNOSIS — N40.1 BENIGN NON-NODULAR PROSTATIC HYPERPLASIA WITH LOWER URINARY TRACT SYMPTOMS: ICD-10-CM

## 2018-06-05 DIAGNOSIS — K21.9 GASTROESOPHAGEAL REFLUX DISEASE WITHOUT ESOPHAGITIS: ICD-10-CM

## 2018-06-05 DIAGNOSIS — R41.89 COGNITIVE IMPAIRMENT: ICD-10-CM

## 2018-06-05 DIAGNOSIS — I50.23 ACUTE ON CHRONIC SYSTOLIC CONGESTIVE HEART FAILURE (H): ICD-10-CM

## 2018-06-05 DIAGNOSIS — I25.5 ISCHEMIC CARDIOMYOPATHY: ICD-10-CM

## 2018-06-05 DIAGNOSIS — E11.42 DM TYPE 2 WITH DIABETIC PERIPHERAL NEUROPATHY (H): ICD-10-CM

## 2018-06-05 PROCEDURE — 99306 1ST NF CARE HIGH MDM 50: CPT | Performed by: INTERNAL MEDICINE

## 2018-06-06 NOTE — TELEPHONE ENCOUNTER
Prescription approved per [facility] Refill Protocol.  Berta Ellison RN

## 2018-06-06 NOTE — PROGRESS NOTES
Clinic Care Coordination Contact  Care Team Conversations    RN MARIA LUISA received a call from [facility], [facility]. Patient is needing to sign a new therapy packet and Xiao feels patient is unable to sign for himself.     RN CC looked in chart and gave Miladys his Emergency contact number.     RN CC will await patient discharge.       Reina Ramirez RN  Clinic Care Coordinator  [phone]  [email or web address]

## 2018-06-06 NOTE — PROGRESS NOTES
Dustin Pearce is a 89 year old male seen June 5, 2018 at [facility] where he was admitted this week after [facility] hospitalization for CAD  Patient has longstanding CAD, first MI in 1970 and a stroke in 1999    He presented on 5/26/18 with CP and NSTEMI.    He had recurrent CP and EKG changes during hospitalization and on 5/30 he underwent coronary angiogram that reveled severe 3v disease.   He had stents placed to LAD and left circumflex, was discharged on ASA and clopidogrel.    He returned to ER the next day with CP, with BNP >10, 000    He was diuresed with furosemide and Imdur dose increased.    Now transferred to TCU for Rehab and ongoing medical management.     Yesterday patient had an episode of hypotension, was pale and weak.   Initial SBP in the 70s, improved to 90s by evening and today 142/60   All anti-hypertensives held this AM   HR 51-64    Patient is his usual self today and working with therapies.   Seen in his room, up to chair.  Denies any CP, dyspnea or change in activity tolerance.       Past Medical History:   Diagnosis Date     Aortic root dilatation (H)      Aortic stenosis      Benign essential hypertension 1/6/2017     Benign non-nodular prostatic hyperplasia with lower urinary tract symptoms 10/20/2016     CAD (coronary artery disease)     MI 1970     Cardiomyopathy (H)      Carotid artery stenosis 10/20/2016    chronically occluded left internal carotid artery     Carotid occlusion, left      Coronary artery disease involving native coronary artery of native heart without angina pectoris 10/20/2016     Diabetes mellitus (H)      DJD (degenerative joint disease)      Gastro-oesophageal reflux disease      Gastroesophageal reflux disease without esophagitis 10/20/2016     Heart attack      Hyperlipidemia      Hypertension      Mild cognitive impairment 10/20/2016     Nephrolithiasis     RECURRENT     Osteopenia      PAD (peripheral artery disease) (H)     peripheral angiogram 5/2017: The common  iliac artery stenoses were stented and the superficial femoral artery stenoses were angioplastied     Paroxysmal atrial fibrillation (H)      PONV (postoperative nausea and vomiting)      RBBB with left anterior fascicular block      Unspecified cerebral artery occlusion with cerebral infarction        Past Surgical History:   Procedure Laterality Date     AMPUTATE FOOT Left 6/4/2017    Procedure: AMPUTATE FOOT;  Sun Add#3: partial left foot amputation - Sagital Saw - Mini C-Arm;  Surgeon: Moe Kirk DPM;  Location:  OR     CHOLECYSTECTOMY       ENDARTERECTOMY CAROTID Right 1/3/2018    Procedure: ENDARTERECTOMY CAROTID;  RIGHT CAROTID ENDARTERECTOMY WITH EEG;  Surgeon: Roland Rodriguez MD;  Location:  OR     ESOPHAGOSCOPY, GASTROSCOPY, DUODENOSCOPY (EGD), COMBINED N/A 12/26/2016    Procedure: COMBINED ESOPHAGOSCOPY, GASTROSCOPY, DUODENOSCOPY (EGD);  Surgeon: Nasim Louis MD;  Location:  GI     GENITOURINARY SURGERY      KIDNEY STONE REMOVAL X 4     HC UGI ENDOSCOPY W EUS N/A 12/29/2016    Procedure: COMBINED ENDOSCOPIC ULTRASOUND, ESOPHAGOSCOPY, GASTROSCOPY, DUODENOSCOPY (EGD);  Surgeon: Nasim Louis MD;  Location:  GI     HERNIA REPAIR       INCISION AND DRAINAGE FOOT, COMBINED Left 6/6/2017    Procedure: COMBINED INCISION AND DRAINAGE FOOT;  REVISIONAL LEFT FOOT INCISION AND DRAINAGE WITH POSSIBLE FLAP CLOSURE , ANTIBIOTIC SOLUTION ;  Surgeon: Nabil Ann DPM;  Location:  OR     LASER HOLMIUM LITHOTRIPSY URETER(S), INSERT STENT, COMBINED  3/2/2012    Procedure:COMBINED CYSTOSCOPY, URETEROSCOPY, LASER HOLMIUM LITHOTRIPSY URETER(S), INSERT STENT; CYSTOSCOPY, RIGHT RETROGRADES, RIGHT URETEROSCOPY, HOLMIUM LASER, RIGHT STENT PLACEMENT; Surgeon:ANJELICA LEÓN; Location: OR     ROTATOR CUFF REPAIR RT/LT         Family History   Problem Relation Age of Onset     Breast Cancer Mother      Heart Failure Father 81       Social History   Substance Use Topics  "    Smoking status: Former Smoker     Packs/day: 1.00     Years: 30.00     Types: Cigarettes     Quit date: 1/1/1999     Smokeless tobacco: Never Used     Alcohol use 4.2 oz/week     7 Standard drinks or equivalent per week      Comment: 1 drink per week      SH:  Lives with his significant other, house in [place].   He is a  of the [employer].     Review Of Systems  Skin: negative   Eyes: impaired vision, glasses  Ears/Nose/Throat: hearing loss  Respiratory: No shortness of breath, dyspnea on exertion, cough, or hemoptysis  Cardiovascular: as above  Gastrointestinal: refulx  Genitourinary: negative  Musculoskeletal: ambulatory with FWW short distances  Neurologic: dementia, SLUMS 18/30    CPT 4.9   SBT done by OCCUPATIONAL THERAPY today, indicative of moderate cognitive impairment  Psychiatric: negative  Hematologic/Lymphatic/Immunologic: anemia, Fe deficiency  Endocrine: diabetes      GENERAL APPEARANCE: alert and no distress  /59  Pulse 66  Temp 97.3  F (36.3  C)  Resp 25  Ht 5' 10\" (1.778 m)  Wt 160 lb 6.4 oz (72.8 kg)  SpO2 95%  BMI 23.02 kg/m2   HEENT: normocephalic, no lesion or abnormalities  NECK: no adenopathy, no asymmetry, masses, or scars and thyroid normal to palpation  RESP: decreased BS, especially bases, no rales or wheeze  CV: regular rate and rhythm, normal S1 S2, occ ectopy, 2/6 CK  ABDOMEN:  soft, nontender, no HSM or masses and bowel sounds normal  MS: extremities normal- no gross deformities noted, no evidence of inflammation in joints, FROM in all extremities.  SKIN: no suspicious lesions or rashes  NEURO: Normal strength and tone, sensory exam grossly normal, and speech okay, limited history.     PSYCH: affect okay  LYMPHATICS: No cervical,  or supraclavicular nodes     Last Basic Metabolic Panel:  Lab Results   Component Value Date     06/03/2018      Lab Results   Component Value Date    POTASSIUM 3.8 06/03/2018     Lab Results   Component Value Date    " CHLORIDE 105 06/03/2018     Lab Results   Component Value Date    ALLISON 8.4 06/03/2018     Lab Results   Component Value Date    CO2 27 06/03/2018     Lab Results   Component Value Date    BUN 13 06/03/2018     Lab Results   Component Value Date    CR 0.82 06/03/2018   GFR 88  Lab Results   Component Value Date     06/03/2018     Lab Results   Component Value Date    WBC 7.5 06/01/2018      HGB 9.8 06/03/2018      MCV 91 06/01/2018       06/01/2018     TSH   Date Value Ref Range Status   06/03/2018 4.45 (H) 0.40 - 4.00 mU/L Final     Lab Results   Component Value Date    A1C 9.1 05/27/2018     ECHO 5/26/18  Interpretation Summary  The visual ejection fraction is estimated at 35-40%.  There is moderate to severe inferolateral wall hypokinesis.  There is moderate inferior wall hypokinesis.  The right ventricle is normal in size and function.  The left atrium is mildly dilated.  There is mild mitral stenosis.  Mild to moderate valvular aortic stenosis.  Mild aortic root dilatation.      IMP/PLAN:    (I21.4) NSTEMI (non-ST elevated myocardial infarction) (H)    Comment: s/p stent placement    Plan: ASA+clopidogrel, statin and beta blocker for secondary prevention.     Follow up with Caridology       (I25.5) Ischemic cardiomyopathy  (I50.23) Acute on chronic systolic congestive heart failure (H)  Comment: stable volume status today     Plan: restart furosemide at 20 mg qod and follow weights, exam.       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   Would consider changing Lantus back to metformin prior to discharge home.     Continue duloxetine and prn amitriptyline for neuropathic symptoms       (I95.2) Hypotension due to drugs  Comment: symptomatic low bps after medications given yesterday AM.     Plan: change lisinopril and  tamsulosin to HS dosing    Decrease metoprolol to 25 mg bid with hold parameters.   Continue current dose of Imdur.       (R41.89) Cognitive impairment  Comment: low functional status, scores as above, now meets criteria for dementia.    Plan: PHYSICAL THERAPY / OCCUPATIONAL THERAPY for strengthening, balance, gait, ADLs.   Patient's discharge goal is return home with significant other.   Will need more support to manage his multiple medical problems; this is his third TCU stay in 2018.        (N40.1) Benign non-nodular prostatic hyperplasia with lower urinary tract symptoms  Comment: longstanding   Plan: continue tamsulosin, at HS as above.     (K21.9) Gastroesophageal reflux disease without esophagitis  Comment: on high dose PPI for h/o GIB   Plan: also on Fe for anemia; hgb lower than baseline.   Will need GI workup as outpatient.         AD: patient continues to elect full code    Chula Barone MD

## 2018-06-06 NOTE — TELEPHONE ENCOUNTER
"Blood glucose monitoring test strip    Last Written Prescription Date:  05/17/18  Last Fill Quantity: 300 strips,  # refills: 0   Last office visit: 4/11/2018 with prescribing provider:  Yuni   Future Office Visit:   Next 5 appointments (look out 90 days)     Jul 12, 2018  6:30 PM CDT   [facility]   [facility]    [address] [address] [phone]            Aug 31, 2018 10:45 AM CDT   Return Visit with Rafa Samuel MD   [facility] ([facility])    [address]  [place] [address] [phone] OPT [address]                 Requested Prescriptions   Pending Prescriptions Disp Refills     blood glucose monitoring (TRUE METRIX BLOOD GLUCOSE TEST) test strip 300 strip 0     Sig: Use to test blood sugar three times daily or as directed.    Diabetic Supplies Protocol Passed    6/4/2018  8:46 PM       Passed - Patient is 18 years of age or older       Passed - Recent (6 mo) or future (30 days) visit within the authorizing provider's specialty    Patient had office visit in the last 6 months or has a visit in the next 30 days with authorizing provider.  See \"Patient Info\" tab in inbasket, or \"Choose Columns\" in Meds & Orders section of the refill encounter.              "

## 2018-06-07 PROBLEM — N40.0 BENIGN PROSTATIC HYPERPLASIA WITHOUT LOWER URINARY TRACT SYMPTOMS: Status: ACTIVE | Noted: 2018-06-07

## 2018-06-07 PROBLEM — I21.4 NSTEMI (NON-ST ELEVATED MYOCARDIAL INFARCTION) (H): Status: ACTIVE | Noted: 2018-06-07

## 2018-06-07 PROBLEM — I50.23 ACUTE ON CHRONIC SYSTOLIC CONGESTIVE HEART FAILURE (H): Status: ACTIVE | Noted: 2018-06-07

## 2018-06-07 PROBLEM — D64.9 ANEMIA: Status: ACTIVE | Noted: 2018-06-07

## 2018-06-07 PROBLEM — R41.89 COGNITIVE IMPAIRMENT: Status: ACTIVE | Noted: 2018-06-07

## 2018-06-08 ENCOUNTER — OFFICE VISIT (OUTPATIENT)
Dept: CARDIOLOGY | Facility: CLINIC | Age: 83
End: 2018-06-08
Payer: MEDICARE

## 2018-06-08 VITALS
DIASTOLIC BLOOD PRESSURE: 50 MMHG | HEART RATE: 53 BPM | SYSTOLIC BLOOD PRESSURE: 116 MMHG | OXYGEN SATURATION: 94 % | WEIGHT: 162.8 LBS | BODY MASS INDEX: 27.12 KG/M2 | HEIGHT: 65 IN

## 2018-06-08 DIAGNOSIS — I25.10 CORONARY ARTERY DISEASE INVOLVING NATIVE HEART, ANGINA PRESENCE UNSPECIFIED, UNSPECIFIED VESSEL OR LESION TYPE: Primary | ICD-10-CM

## 2018-06-08 DIAGNOSIS — Z79.4 TYPE 2 DIABETES MELLITUS WITH COMPLICATION, WITH LONG-TERM CURRENT USE OF INSULIN (H): Primary | ICD-10-CM

## 2018-06-08 DIAGNOSIS — E11.8 TYPE 2 DIABETES MELLITUS WITH COMPLICATION, WITH LONG-TERM CURRENT USE OF INSULIN (H): Primary | ICD-10-CM

## 2018-06-08 DIAGNOSIS — I10 BENIGN ESSENTIAL HYPERTENSION: ICD-10-CM

## 2018-06-08 DIAGNOSIS — I25.5 ISCHEMIC CARDIOMYOPATHY: ICD-10-CM

## 2018-06-08 DIAGNOSIS — E11.9 TYPE 2 DIABETES MELLITUS WITHOUT COMPLICATION (H): ICD-10-CM

## 2018-06-08 LAB — INTERPRETATION ECG - MUSE: NORMAL

## 2018-06-08 PROCEDURE — 99214 OFFICE O/P EST MOD 30 MIN: CPT | Performed by: NURSE PRACTITIONER

## 2018-06-08 NOTE — PATIENT INSTRUCTIONS
Today's Recommendations    1. Continue aspirin and Plavix  2. Continue all other medications without changes.  3. Please follow up with Dr. Samuel in August.    Please send a [employer] message or call [phone] with questions or concerns.     Scheduling number [phone].

## 2018-06-08 NOTE — MR AVS SNAPSHOT
After Visit Summary   6/8/2018    Dustin Pearce    MRN: [id number]           Patient Information     Date Of Birth          1/31/1928        Visit Information        Provider Department      6/8/2018 12:30 PM Catherine Laurent APRN CNP [facility]        Care Instructions    Today's Recommendations    1. Continue aspirin and Plavix  2. Continue all other medications without changes.  3. Please follow up with Dr. Samuel in August.    Please send a [employer] message or call [phone] with questions or concerns.     Scheduling number [phone].            Follow-ups after your visit        Your next 10 appointments already scheduled     Jul 12, 2018  6:30 PM CDT   [facility]    [address] [address] [phone]           Bring a current list of meds and any records pertaining to this visit. For Physicals, please bring immunization records and any forms needing to be filled out. Please arrive 10 minutes early to complete paperwork.            Aug 31, 2018 10:45 AM CDT   Return Visit with Rafa Samuel MD   [facility] ([facility])    [address]  [place] [address] [phone] OPT [address]              Who to contact     If you have questions or need follow up information about today's clinic visit or your schedule please contact [facility] directly at [phone].  Normal or non-critical lab and imaging results will be communicated to you by Jet[employer]ore[employer], letter or phone within 4 business days after the clinic has received the results. If you do not hear from us within 7 days, please contact the clinic through [employer] or phone. If you have a critical or abnormal lab result, we will notify you by phone as soon as possible.  Submit refill requests through [employer] or call  "your pharmacy and they will forward the refill request to us. Please allow 3 business days for your refill to be completed.          Additional Information About Your Visit        Care EveryWhere ID     This is your Care EveryWhere ID. This could be used by other organizations to access your [place] medical records  [id number]        Your Vitals Were     Pulse Height Pulse Oximetry BMI (Body Mass Index)          53 1.651 m (5' 5\") 94% 27.09 kg/m2         Blood Pressure from Last 3 Encounters:   06/08/18 116/50   06/05/18 105/59   06/04/18 95/55    Weight from Last 3 Encounters:   06/08/18 73.8 kg (162 lb 12.8 oz)   06/05/18 72.8 kg (160 lb 6.4 oz)   06/04/18 73.3 kg (161 lb 9.6 oz)              Today, you had the following     No orders found for display         Where to get your medicines      Some of these will need a paper prescription and others can be bought over the counter.  Ask your nurse if you have questions.     Bring a paper prescription for each of these medications     order for DME          Primary Care Provider Office Phone # Fax #    Matt Morrissey [phone] [address] [address]        Equal Access to Services     LAURENCE JACOBSON : Hadii kinza oconnoro Socharley, waaxda luqadaha, qaybta kaalmada adeegyada, maurice miranda. So [facility] [phone].    ATENCIÓN: Si habla español, tiene a garvey disposición servicios gratuitos de asistencia lingüística. Llame al [phone].    We comply with applicable federal civil rights laws and [place] laws. We do not discriminate on the basis of race, color, national origin, age, disability, sex, sexual orientation, or gender identity.            Thank you!     Thank you for choosing [facility]  for your care. Our goal is always to provide you with excellent care. Hearing back from our patients is one way we can continue to improve our services. Please take " a few minutes to complete the written survey that you may receive in the mail after your visit with us. Thank you!             Your Updated Medication List - Protect others around you: Learn how to safely use, store and throw away your medicines at www.disposemymeds.org.          This list is accurate as of 6/8/18 12:54 PM.  Always use your most recent med list.                   Brand Name Dispense Instructions for use Diagnosis    AMITRIPTYLINE HCL PO      Take 25 mg by mouth nightly as needed for sleep        aspirin 81 MG EC tablet     30 tablet    Take 1 tablet (81 mg) by mouth daily    Transient cerebral ischemia, unspecified type       ATORVASTATIN CALCIUM PO      Take 40 mg by mouth At Bedtime        bisacodyl 10 MG Suppository    DULCOLAX     Place 10 mg rectally daily as needed for constipation        blood glucose monitoring test strip    TRUE METRIX BLOOD GLUCOSE TEST    300 strip    USE TO TEST BLOOD SUGAR THREE TIMES DAILY OR AS DIRECTED    Hypoglycemia       Cholecalciferol 18794 units Tabs      Take 1 tablet by mouth three times a week (Mon, Wed, Fri)        DULoxetine 30 MG EC capsule    CYMBALTA    90 capsule    Take 1 capsule (30 mg) by mouth daily    Polyneuropathy associated with underlying disease (H)       ferrous gluconate 324 (38 Fe) MG tablet    FERGON    100 tablet    Take 1 tablet (324 mg) by mouth daily (with breakfast)    Iron deficiency anemia, unspecified iron deficiency anemia type       furosemide 20 MG tablet    LASIX    30 tablet    Take 1 tablet (20 mg) by mouth every other day Hold if systolic bp < 100 or patients appears dehydrated or having poor oral intake    Coronary artery disease involving native heart, angina presence unspecified, unspecified vessel or lesion type       glipiZIDE 10 MG tablet    GLUCOTROL    180 tablet    Take 1 tablet (10 mg) by mouth 2 times daily (before meals)    Type 2 diabetes mellitus with diabetic peripheral angiopathy without gangrene, without  long-term current use of insulin (H)       insulin glargine 100 UNIT/ML injection    LANTUS SOLOSTAR    10 mL    Inject 14 Units Subcutaneous At Bedtime    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)       ipratropium 0.03 % spray    ATROVENT    30 mL    INHALE 1 SPRAY IN EACH NOSTRIL EVERY 12 HOURS AS NEEDED    Runny nose       isosorbide mononitrate 30 MG 24 hr tablet    IMDUR    60 tablet    Take 2 tablets (60 mg) by mouth daily    Coronary artery disease involving native heart, angina presence unspecified, unspecified vessel or lesion type       lisinopril 5 MG tablet    PRINIVIL/ZESTRIL    30 tablet    Take 1 tablet (5 mg) by mouth daily    Coronary artery disease involving native heart, angina presence unspecified, unspecified vessel or lesion type       magnesium oxide 400 (241.3 Mg) MG tablet    MAG-OX    60 tablet    TAKE 1 TABLET BY MOUTH TWICE DAILY    Constipation, unspecified constipation type       metoprolol tartrate 50 MG tablet    LOPRESSOR    60 tablet    Take 1 tablet (50 mg) by mouth 2 times daily    Coronary artery disease involving native heart, angina presence unspecified, unspecified vessel or lesion type       nitroGLYcerin 0.4 MG sublingual tablet    NITROSTAT    25 tablet    For chest pain place 1 tablet under the tongue every 5 minutes for 3 doses. If symptoms persist 5 minutes after 1st dose call 911.    Coronary artery disease involving native heart, angina presence unspecified, unspecified vessel or lesion type       OMEPRAZOLE PO      Take 40 mg by mouth 2 times daily        order for DME     1 Act    Equipment being ordered: True Matrix Blood Glucose meter.    Type 2 diabetes mellitus with complication, with long-term current use of insulin (H)       PLAVIX PO      Take 75 mg by mouth daily    Cough       polyethylene glycol Packet    MIRALAX/GLYCOLAX     Take 17 g by mouth daily as needed for constipation        tamsulosin 0.4 MG capsule    FLOMAX    90 capsule    Take 1  capsule (0.4 mg) by mouth daily    Benign non-nodular prostatic hyperplasia with lower urinary tract symptoms

## 2018-06-08 NOTE — LETTER
6/8/2018    Matt Morrissey MD  [address]  [facility] [address]    RE: Dustin Pearce       Dear Colleague,    I had the pleasure of seeing Dustin Pearce in the [facility].    Cardiology Clinic Progress Note  Dustin Pearce MRN# [id number]   YOB: 1928 Age: 89 year old     Reason for visit: Discharge follow up           Assessment and Plan:     1. Non-STEMI/coronary artery disease    Coronary angiogram revealed  of the RCA and severe proximal left circumflex and LAD disease     Status post drug-eluting stent to the LAD and circumflex    Continue dual antiplatelet therapy with aspirin and Plavix for 1 year uninterrupted and aspirin lifelong    Continue isosorbide mononitrate 60 mg daily, metoprolol 50 mg twice daily and lisinopril 5 mg daily    Follow-up with Dr. Samuel as scheduled in August    2. Ischemic heart cardiomyopathy, chronic systolic heart failure exacerbation    LVEF estimated at 35-40%    Continue metoprolol 50 mg twice daily, lisinopril 5 mg daily and Lasix 20 mg daily    3. Hypertension    Well-controlled    Continue metoprolol and lisinopril    4. Peripheral arterial disease    Status post right carotid endarterectomy and stenting to the left common iliac artery and angioplasty to the left SFA.    Follows with Dr. Rodriguez    5. Hyperlipidemia    Most recent LDL was 61    Continue atorvastatin 40 mg daily         History of Presenting Illness:    Dustin Pearce is a very pleasant 89 year old patient of Dr. Samuel who presents today for a hospital discharge follow up.  He has a past medical history significant for diabetes, hypertension, dyslipidemia, anxiety, depression, prior TIA, ischemic cardiomyopathy, chronic systolic congestive heart failure, coronary artery disease (status post recent stenting to the proximal LAD, proximal circumflex and  of the RCA).  He also has a history of peripheral arterial disease status post right carotid  endarterectomy, left carotid occlusion and status post left common iliac artery stenting and left SFA angioplasty.    She was admitted to [facility] from 5/26/2018 to 5/31/2018 with a non-ST elevation MI.  He underwent cardiac catheterization that showed severe three-vessel disease including  of the RCA, severe proximal LAD and severe proximal left circumflex disease.  He underwent a drug-eluting stent to the LAD and circumflex.  The cath was performed by a right femoral approach.  He was noted to have severe left subclavian and axillary artery disease.  An echocardiogram revealed that his LVEF was estimated at 35-40% with severe inferolateral wall hypokinesis.  There is moderate inferior wall hypokinesis as well.  The RV was normal size and function and the LA was mildly dilated.  There was mild to moderate aortic stenosis.    He was discharged home on 5/31/2018.  When he returned home, he had recurrent chest discomfort that was different than his initial presentation.  He represented to the hospital no additional cardiac interventions were completed.  His proBNP was elevated at greater than 10,000 and his troponin peaked at 0.133.  He was diuresed with oral Lasix and discharged to TCU at [facility] on 20 mg of Lasix daily.    Today in clinic he presents with his wife, Halina.  He is currently still residing at [facility] undergoing therapy.  He denies any chest discomfort, shortness of breath, recurrent dizziness, PND orthopnea.  He has no lower extremity edema.          This note was completed in part using Dragon voice recognition software. Although reviewed after completion, some word and grammatical errors may occur       Review of Systems:   Review of Systems:  Skin:  Positive for bruising   Eyes:  Positive for glasses  ENT:  Positive for hearing loss  Respiratory:  Positive for shortness of breath;cough  Cardiovascular:  Negative for;palpitations;chest pain;edema dizziness;Positive  "for  Gastroenterology: Positive for heartburn;reflux  Genitourinary:  Negative    Musculoskeletal:  Positive for arthritis  Neurologic:  Positive for stroke  Psychiatric:  Negative    Heme/Lymph/Imm:  Negative    Endocrine:  Positive for diabetes              Physical Exam:     Vitals: /50 (BP Location: Right arm, Patient Position: Chair, Cuff Size: Adult Regular)  Pulse 53  Ht 1.651 m (5' 5\")  Wt 73.8 kg (162 lb 12.8 oz)  SpO2 94%  BMI 27.09 kg/m2  Constitutional:  cooperative, alert and oriented, well developed, well nourished, in no acute distress        Skin:  warm and dry to the touch, no apparent skin lesions or masses noted        Head:  normocephalic, no masses or lesions        Eyes:  pupils equal and round;conjunctivae and lids unremarkable;sclera white        ENT:  no pallor or cyanosis, dentition good        Neck:  JVP normal   No L carotid pulse    Chest:  normal breath sounds, clear to auscultation, normal A-P diameter, normal symmetry, normal respiratory excursion, no use of accessory muscles        Cardiac: regular rhythm                  Abdomen:  abdomen soft        Vascular:   pulses below the femoral arteries are diminished                                    Extremities and Back:  no edema        Neurological:  no gross motor deficits               Medications:     Current Outpatient Prescriptions   Medication Sig Dispense Refill     AMITRIPTYLINE HCL PO Take 25 mg by mouth nightly as needed for sleep       aspirin EC 81 MG EC tablet Take 1 tablet (81 mg) by mouth daily 30 tablet 0     ATORVASTATIN CALCIUM PO Take 40 mg by mouth At Bedtime        bisacodyl (DULCOLAX) 10 MG Suppository Place 10 mg rectally daily as needed for constipation       blood glucose monitoring (TRUE METRIX BLOOD GLUCOSE TEST) test strip USE TO TEST BLOOD SUGAR THREE TIMES DAILY OR AS DIRECTED 300 strip 1     Cholecalciferol 48793 UNITS TABS Take 1 tablet by mouth three times a week (Mon, Wed, Fri)       " Clopidogrel Bisulfate, [id number], (PLAVIX PO) Take 75 mg by mouth daily        DULoxetine (CYMBALTA) 30 MG EC capsule Take 1 capsule (30 mg) by mouth daily 90 capsule 3     ferrous gluconate (FERGON) 324 (38 Fe) MG tablet Take 1 tablet (324 mg) by mouth daily (with breakfast) 100 tablet 0     furosemide (LASIX) 20 MG tablet Take 1 tablet (20 mg) by mouth every other day Hold if systolic bp < 100 or patients appears dehydrated or having poor oral intake 30 tablet 0     glipiZIDE (GLUCOTROL) 10 MG tablet Take 1 tablet (10 mg) by mouth 2 times daily (before meals) 180 tablet 3     insulin glargine (LANTUS SOLOSTAR) 100 UNIT/ML pen Inject 14 Units Subcutaneous At Bedtime 10 mL 0     ipratropium (ATROVENT) 0.03 % spray INHALE 1 SPRAY IN EACH NOSTRIL EVERY 12 HOURS AS NEEDED 30 mL 0     isosorbide mononitrate (IMDUR) 30 MG 24 hr tablet Take 2 tablets (60 mg) by mouth daily 60 tablet 0     lisinopril (PRINIVIL/ZESTRIL) 5 MG tablet Take 1 tablet (5 mg) by mouth daily 30 tablet 1     magnesium oxide (MAG-OX) 400 (241.3 Mg) MG tablet TAKE 1 TABLET BY MOUTH TWICE DAILY 60 tablet 0     metoprolol tartrate (LOPRESSOR) 50 MG tablet Take 1 tablet (50 mg) by mouth 2 times daily 60 tablet 1     nitroGLYcerin (NITROSTAT) 0.4 MG sublingual tablet For chest pain place 1 tablet under the tongue every 5 minutes for 3 doses. If symptoms persist 5 minutes after 1st dose call 911. 25 tablet 0     OMEPRAZOLE PO Take 40 mg by mouth 2 times daily        order for DME Equipment being ordered: True Matrix Blood Glucose meter. 1 Act 11     polyethylene glycol (MIRALAX/GLYCOLAX) Packet Take 17 g by mouth daily as needed for constipation       tamsulosin (FLOMAX) 0.4 MG capsule Take 1 capsule (0.4 mg) by mouth daily 90 capsule 3           Family History   Problem Relation Age of Onset     Breast Cancer Mother      Heart Failure Father 81       Social History     Social History     Marital status:      Spouse name: N/A     Number of  children: N/A     Years of education: N/A     Occupational History     Not on file.     Social History Main Topics     Smoking status: Former Smoker     Packs/day: 1.00     Years: 30.00     Types: Cigarettes     Quit date: 1/1/1999     Smokeless tobacco: Never Used     Alcohol use 4.2 oz/week     7 Standard drinks or equivalent per week      Comment: 1 drink per biweekly     Drug use: No     Sexual activity: Not Currently     Partners: Female     Other Topics Concern     Not on file     Social History Narrative            Past Medical History:     Past Medical History:   Diagnosis Date     Aortic root dilatation (H)      Aortic stenosis      Benign essential hypertension 1/6/2017     Benign non-nodular prostatic hyperplasia with lower urinary tract symptoms 10/20/2016     CAD (coronary artery disease)     MI 1970     Cardiomyopathy (H)      Carotid artery stenosis 10/20/2016    chronically occluded left internal carotid artery     Carotid occlusion, left      Coronary artery disease involving native coronary artery of native heart without angina pectoris 10/20/2016     Diabetes mellitus (H)      DJD (degenerative joint disease)      Gastro-oesophageal reflux disease      Gastroesophageal reflux disease without esophagitis 10/20/2016     Heart attack      Hyperlipidemia      Hypertension      Mild cognitive impairment 10/20/2016     Nephrolithiasis     RECURRENT     Osteopenia      PAD (peripheral artery disease) (H)     peripheral angiogram 5/2017: The common iliac artery stenoses were stented and the superficial femoral artery stenoses were angioplastied     Paroxysmal atrial fibrillation (H)      PONV (postoperative nausea and vomiting)      RBBB with left anterior fascicular block      Unspecified cerebral artery occlusion with cerebral infarction               Past Surgical History:     Past Surgical History:   Procedure Laterality Date     AMPUTATE FOOT Left 6/4/2017    Procedure: AMPUTATE FOOT;  Sun Add#3:  partial left foot amputation - Sagital Saw - Mini C-Arm;  Surgeon: Moe Kirk DPM;  Location:  OR     CHOLECYSTECTOMY       ENDARTERECTOMY CAROTID Right 1/3/2018    Procedure: ENDARTERECTOMY CAROTID;  RIGHT CAROTID ENDARTERECTOMY WITH EEG;  Surgeon: Roland Rodriguez MD;  Location:  OR     ESOPHAGOSCOPY, GASTROSCOPY, DUODENOSCOPY (EGD), COMBINED N/A 12/26/2016    Procedure: COMBINED ESOPHAGOSCOPY, GASTROSCOPY, DUODENOSCOPY (EGD);  Surgeon: Nasim Louis MD;  Location:  GI     GENITOURINARY SURGERY      KIDNEY STONE REMOVAL X 4     HC UGI ENDOSCOPY W EUS N/A 12/29/2016    Procedure: COMBINED ENDOSCOPIC ULTRASOUND, ESOPHAGOSCOPY, GASTROSCOPY, DUODENOSCOPY (EGD);  Surgeon: Nasim Louis MD;  Location:  GI     HERNIA REPAIR       INCISION AND DRAINAGE FOOT, COMBINED Left 6/6/2017    Procedure: COMBINED INCISION AND DRAINAGE FOOT;  REVISIONAL LEFT FOOT INCISION AND DRAINAGE WITH POSSIBLE FLAP CLOSURE , ANTIBIOTIC SOLUTION ;  Surgeon: Nabil Ann DPM;  Location:  OR     LASER HOLMIUM LITHOTRIPSY URETER(S), INSERT STENT, COMBINED  3/2/2012    Procedure:COMBINED CYSTOSCOPY, URETEROSCOPY, LASER HOLMIUM LITHOTRIPSY URETER(S), INSERT STENT; CYSTOSCOPY, RIGHT RETROGRADES, RIGHT URETEROSCOPY, HOLMIUM LASER, RIGHT STENT PLACEMENT; Surgeon:ANJELICA LEÓN; Location: OR     ROTATOR CUFF REPAIR RT/LT                Allergies:   Oxycodone; Sulfa drugs; and Tetracycline       Data:   All laboratory data reviewed:    LAST CHOLESTEROL:  Lab Results   Component Value Date    CHOL 133 12/31/2017     Lab Results   Component Value Date    HDL 43 12/31/2017     Lab Results   Component Value Date    LDL 61 12/31/2017     Lab Results   Component Value Date    TRIG 147 12/31/2017     Lab Results   Component Value Date    CHOLHDLRATIO 5.1 12/18/2015       LAST BMP:  Lab Results   Component Value Date     06/04/2018      Lab Results   Component Value Date    POTASSIUM 4.6  06/04/2018     Lab Results   Component Value Date    CHLORIDE 103 06/04/2018     Lab Results   Component Value Date    ALLISON 8.7 06/04/2018     Lab Results   Component Value Date    CO2 27 06/04/2018     Lab Results   Component Value Date    BUN 19 06/04/2018     Lab Results   Component Value Date    CR 1.17 06/04/2018     Lab Results   Component Value Date     06/04/2018       LAST CBC:  Lab Results   Component Value Date    WBC 7.5 06/04/2018     Lab Results   Component Value Date    RBC 3.38 06/04/2018     Lab Results   Component Value Date    HGB 9.8 06/04/2018     Lab Results   Component Value Date    HCT 3.5 06/04/2018     Lab Results   Component Value Date    MCV 93 06/04/2018     Lab Results   Component Value Date    MCH 29.0 06/04/2018     Lab Results   Component Value Date    MCHC 31.1 06/04/2018     Lab Results   Component Value Date    RDW 14.8 06/04/2018     Lab Results   Component Value Date     06/04/2018       Thank you for allowing me to participate in the care of your patient.    Sincerely,     ASPEN CASTRO [facility]

## 2018-06-08 NOTE — LETTER
6/8/2018    Matt Morrissey MD  [address]  [facility] [address]    RE: Dustin Pearce       Dear Colleague,    I had the pleasure of seeing Dustin Pearce in the [facility].    Cardiology Clinic Progress Note  Dustin Pearce MRN# [id number]   YOB: 1928 Age: 89 year old     Reason for visit: Discharge follow up           Assessment and Plan:     1. Non-STEMI/coronary artery disease    Coronary angiogram revealed  of the RCA and severe proximal left circumflex and LAD disease     Status post drug-eluting stent to the LAD and circumflex    Continue dual antiplatelet therapy with aspirin and Plavix for 1 year uninterrupted and aspirin lifelong    Continue isosorbide mononitrate 60 mg daily, metoprolol 50 mg twice daily and lisinopril 5 mg daily    Follow-up with Dr. Samuel as scheduled in August    2. Ischemic heart cardiomyopathy, chronic systolic heart failure exacerbation    LVEF estimated at 35-40%    Continue metoprolol 50 mg twice daily, lisinopril 5 mg daily and Lasix 20 mg daily    3. Hypertension    Well-controlled    Continue metoprolol and lisinopril    4. Peripheral arterial disease    Status post right carotid endarterectomy and stenting to the left common iliac artery and angioplasty to the left SFA.    Follows with Dr. Rodriguez    5. Hyperlipidemia    Most recent LDL was 61    Continue atorvastatin 40 mg daily         History of Presenting Illness:    Dustin Pearce is a very pleasant 89 year old patient of Dr. Samuel who presents today for a hospital discharge follow up.  He has a past medical history significant for diabetes, hypertension, dyslipidemia, anxiety, depression, prior TIA, ischemic cardiomyopathy, chronic systolic congestive heart failure, coronary artery disease (status post recent stenting to the proximal LAD, proximal circumflex and  of the RCA).  He also has a history of peripheral arterial disease status post right carotid  endarterectomy, left carotid occlusion and status post left common iliac artery stenting and left SFA angioplasty.    She was admitted to [facility] from 5/26/2018 to 5/31/2018 with a non-ST elevation MI.  He underwent cardiac catheterization that showed severe three-vessel disease including  of the RCA, severe proximal LAD and severe proximal left circumflex disease.  He underwent a drug-eluting stent to the LAD and circumflex.  The cath was performed by a right femoral approach.  He was noted to have severe left subclavian and axillary artery disease.  An echocardiogram revealed that his LVEF was estimated at 35-40% with severe inferolateral wall hypokinesis.  There is moderate inferior wall hypokinesis as well.  The RV was normal size and function and the LA was mildly dilated.  There was mild to moderate aortic stenosis.    He was discharged home on 5/31/2018.  When he returned home, he had recurrent chest discomfort that was different than his initial presentation.  He represented to the hospital no additional cardiac interventions were completed.  His proBNP was elevated at greater than 10,000 and his troponin peaked at 0.133.  He was diuresed with oral Lasix and discharged to TCU at [facility] on 20 mg of Lasix daily.    Today in clinic he presents with his wife, Halina.  He is currently still residing at [facility] undergoing therapy.  He denies any chest discomfort, shortness of breath, recurrent dizziness, PND orthopnea.  He has no lower extremity edema.          This note was completed in part using Dragon voice recognition software. Although reviewed after completion, some word and grammatical errors may occur       Review of Systems:   Review of Systems:  Skin:  Positive for bruising   Eyes:  Positive for glasses  ENT:  Positive for hearing loss  Respiratory:  Positive for shortness of breath;cough  Cardiovascular:  Negative for;palpitations;chest pain;edema dizziness;Positive  "for  Gastroenterology: Positive for heartburn;reflux  Genitourinary:  Negative    Musculoskeletal:  Positive for arthritis  Neurologic:  Positive for stroke  Psychiatric:  Negative    Heme/Lymph/Imm:  Negative    Endocrine:  Positive for diabetes              Physical Exam:     Vitals: /50 (BP Location: Right arm, Patient Position: Chair, Cuff Size: Adult Regular)  Pulse 53  Ht 1.651 m (5' 5\")  Wt 73.8 kg (162 lb 12.8 oz)  SpO2 94%  BMI 27.09 kg/m2  Constitutional:  cooperative, alert and oriented, well developed, well nourished, in no acute distress        Skin:  warm and dry to the touch, no apparent skin lesions or masses noted        Head:  normocephalic, no masses or lesions        Eyes:  pupils equal and round;conjunctivae and lids unremarkable;sclera white        ENT:  no pallor or cyanosis, dentition good        Neck:  JVP normal   No L carotid pulse    Chest:  normal breath sounds, clear to auscultation, normal A-P diameter, normal symmetry, normal respiratory excursion, no use of accessory muscles        Cardiac: regular rhythm                  Abdomen:  abdomen soft        Vascular:   pulses below the femoral arteries are diminished                                    Extremities and Back:  no edema        Neurological:  no gross motor deficits               Medications:     Current Outpatient Prescriptions   Medication Sig Dispense Refill     AMITRIPTYLINE HCL PO Take 25 mg by mouth nightly as needed for sleep       aspirin EC 81 MG EC tablet Take 1 tablet (81 mg) by mouth daily 30 tablet 0     ATORVASTATIN CALCIUM PO Take 40 mg by mouth At Bedtime        bisacodyl (DULCOLAX) 10 MG Suppository Place 10 mg rectally daily as needed for constipation       blood glucose monitoring (TRUE METRIX BLOOD GLUCOSE TEST) test strip USE TO TEST BLOOD SUGAR THREE TIMES DAILY OR AS DIRECTED 300 strip 1     Cholecalciferol 12243 UNITS TABS Take 1 tablet by mouth three times a week (Mon, Wed, Fri)       " Clopidogrel Bisulfate, [id number], (PLAVIX PO) Take 75 mg by mouth daily        DULoxetine (CYMBALTA) 30 MG EC capsule Take 1 capsule (30 mg) by mouth daily 90 capsule 3     ferrous gluconate (FERGON) 324 (38 Fe) MG tablet Take 1 tablet (324 mg) by mouth daily (with breakfast) 100 tablet 0     furosemide (LASIX) 20 MG tablet Take 1 tablet (20 mg) by mouth every other day Hold if systolic bp < 100 or patients appears dehydrated or having poor oral intake 30 tablet 0     glipiZIDE (GLUCOTROL) 10 MG tablet Take 1 tablet (10 mg) by mouth 2 times daily (before meals) 180 tablet 3     insulin glargine (LANTUS SOLOSTAR) 100 UNIT/ML pen Inject 14 Units Subcutaneous At Bedtime 10 mL 0     ipratropium (ATROVENT) 0.03 % spray INHALE 1 SPRAY IN EACH NOSTRIL EVERY 12 HOURS AS NEEDED 30 mL 0     isosorbide mononitrate (IMDUR) 30 MG 24 hr tablet Take 2 tablets (60 mg) by mouth daily 60 tablet 0     lisinopril (PRINIVIL/ZESTRIL) 5 MG tablet Take 1 tablet (5 mg) by mouth daily 30 tablet 1     magnesium oxide (MAG-OX) 400 (241.3 Mg) MG tablet TAKE 1 TABLET BY MOUTH TWICE DAILY 60 tablet 0     metoprolol tartrate (LOPRESSOR) 50 MG tablet Take 1 tablet (50 mg) by mouth 2 times daily 60 tablet 1     nitroGLYcerin (NITROSTAT) 0.4 MG sublingual tablet For chest pain place 1 tablet under the tongue every 5 minutes for 3 doses. If symptoms persist 5 minutes after 1st dose call 911. 25 tablet 0     OMEPRAZOLE PO Take 40 mg by mouth 2 times daily        order for DME Equipment being ordered: True Matrix Blood Glucose meter. 1 Act 11     polyethylene glycol (MIRALAX/GLYCOLAX) Packet Take 17 g by mouth daily as needed for constipation       tamsulosin (FLOMAX) 0.4 MG capsule Take 1 capsule (0.4 mg) by mouth daily 90 capsule 3           Family History   Problem Relation Age of Onset     Breast Cancer Mother      Heart Failure Father 81       Social History     Social History     Marital status:      Spouse name: N/A     Number of  children: N/A     Years of education: N/A     Occupational History     Not on file.     Social History Main Topics     Smoking status: Former Smoker     Packs/day: 1.00     Years: 30.00     Types: Cigarettes     Quit date: 1/1/1999     Smokeless tobacco: Never Used     Alcohol use 4.2 oz/week     7 Standard drinks or equivalent per week      Comment: 1 drink per biweekly     Drug use: No     Sexual activity: Not Currently     Partners: Female     Other Topics Concern     Not on file     Social History Narrative            Past Medical History:     Past Medical History:   Diagnosis Date     Aortic root dilatation (H)      Aortic stenosis      Benign essential hypertension 1/6/2017     Benign non-nodular prostatic hyperplasia with lower urinary tract symptoms 10/20/2016     CAD (coronary artery disease)     MI 1970     Cardiomyopathy (H)      Carotid artery stenosis 10/20/2016    chronically occluded left internal carotid artery     Carotid occlusion, left      Coronary artery disease involving native coronary artery of native heart without angina pectoris 10/20/2016     Diabetes mellitus (H)      DJD (degenerative joint disease)      Gastro-oesophageal reflux disease      Gastroesophageal reflux disease without esophagitis 10/20/2016     Heart attack      Hyperlipidemia      Hypertension      Mild cognitive impairment 10/20/2016     Nephrolithiasis     RECURRENT     Osteopenia      PAD (peripheral artery disease) (H)     peripheral angiogram 5/2017: The common iliac artery stenoses were stented and the superficial femoral artery stenoses were angioplastied     Paroxysmal atrial fibrillation (H)      PONV (postoperative nausea and vomiting)      RBBB with left anterior fascicular block      Unspecified cerebral artery occlusion with cerebral infarction               Past Surgical History:     Past Surgical History:   Procedure Laterality Date     AMPUTATE FOOT Left 6/4/2017    Procedure: AMPUTATE FOOT;  Sun Add#3:  partial left foot amputation - Sagital Saw - Mini C-Arm;  Surgeon: Moe Kirk DPM;  Location:  OR     CHOLECYSTECTOMY       ENDARTERECTOMY CAROTID Right 1/3/2018    Procedure: ENDARTERECTOMY CAROTID;  RIGHT CAROTID ENDARTERECTOMY WITH EEG;  Surgeon: Roland Rodriguez MD;  Location:  OR     ESOPHAGOSCOPY, GASTROSCOPY, DUODENOSCOPY (EGD), COMBINED N/A 12/26/2016    Procedure: COMBINED ESOPHAGOSCOPY, GASTROSCOPY, DUODENOSCOPY (EGD);  Surgeon: Nasim Louis MD;  Location:  GI     GENITOURINARY SURGERY      KIDNEY STONE REMOVAL X 4     HC UGI ENDOSCOPY W EUS N/A 12/29/2016    Procedure: COMBINED ENDOSCOPIC ULTRASOUND, ESOPHAGOSCOPY, GASTROSCOPY, DUODENOSCOPY (EGD);  Surgeon: Nasim Louis MD;  Location:  GI     HERNIA REPAIR       INCISION AND DRAINAGE FOOT, COMBINED Left 6/6/2017    Procedure: COMBINED INCISION AND DRAINAGE FOOT;  REVISIONAL LEFT FOOT INCISION AND DRAINAGE WITH POSSIBLE FLAP CLOSURE , ANTIBIOTIC SOLUTION ;  Surgeon: Nabil Ann DPM;  Location:  OR     LASER HOLMIUM LITHOTRIPSY URETER(S), INSERT STENT, COMBINED  3/2/2012    Procedure:COMBINED CYSTOSCOPY, URETEROSCOPY, LASER HOLMIUM LITHOTRIPSY URETER(S), INSERT STENT; CYSTOSCOPY, RIGHT RETROGRADES, RIGHT URETEROSCOPY, HOLMIUM LASER, RIGHT STENT PLACEMENT; Surgeon:ANJELICA LEÓN; Location: OR     ROTATOR CUFF REPAIR RT/LT                Allergies:   Oxycodone; Sulfa drugs; and Tetracycline       Data:   All laboratory data reviewed:    LAST CHOLESTEROL:  Lab Results   Component Value Date    CHOL 133 12/31/2017     Lab Results   Component Value Date    HDL 43 12/31/2017     Lab Results   Component Value Date    LDL 61 12/31/2017     Lab Results   Component Value Date    TRIG 147 12/31/2017     Lab Results   Component Value Date    CHOLHDLRATIO 5.1 12/18/2015       LAST BMP:  Lab Results   Component Value Date     06/04/2018      Lab Results   Component Value Date    POTASSIUM 4.6  06/04/2018     Lab Results   Component Value Date    CHLORIDE 103 06/04/2018     Lab Results   Component Value Date    ALLISON 8.7 06/04/2018     Lab Results   Component Value Date    CO2 27 06/04/2018     Lab Results   Component Value Date    BUN 19 06/04/2018     Lab Results   Component Value Date    CR 1.17 06/04/2018     Lab Results   Component Value Date     06/04/2018       LAST CBC:  Lab Results   Component Value Date    WBC 7.5 06/04/2018     Lab Results   Component Value Date    RBC 3.38 06/04/2018     Lab Results   Component Value Date    HGB 9.8 06/04/2018     Lab Results   Component Value Date    HCT 3.5 06/04/2018     Lab Results   Component Value Date    MCV 93 06/04/2018     Lab Results   Component Value Date    MCH 29.0 06/04/2018     Lab Results   Component Value Date    MCHC 31.1 06/04/2018     Lab Results   Component Value Date    RDW 14.8 06/04/2018     Lab Results   Component Value Date     06/04/2018         APSEN CASTRO, CNP                  Thank you for allowing me to participate in the care of your patient.      Sincerely,     ASPEN CASTRO CNP     [facility]    cc:   Matt Morrissey MD  [address]  [place] [address]

## 2018-06-08 NOTE — PROGRESS NOTES
Cardiology Clinic Progress Note  Dustin Pearce MRN# [id number]   YOB: 1928 Age: 89 year old     Reason for visit: Discharge follow up           Assessment and Plan:     1. Non-STEMI/coronary artery disease    Coronary angiogram revealed  of the RCA and severe proximal left circumflex and LAD disease     Status post drug-eluting stent to the LAD and circumflex    Continue dual antiplatelet therapy with aspirin and Plavix for 1 year uninterrupted and aspirin lifelong    Continue isosorbide mononitrate 60 mg daily, metoprolol 50 mg twice daily and lisinopril 5 mg daily    Follow-up with Dr. Samuel as scheduled in August    2. Ischemic heart cardiomyopathy, chronic systolic heart failure exacerbation    LVEF estimated at 35-40%    Continue metoprolol 50 mg twice daily, lisinopril 5 mg daily and Lasix 20 mg daily    3. Hypertension    Well-controlled    Continue metoprolol and lisinopril    4. Peripheral arterial disease    Status post right carotid endarterectomy and stenting to the left common iliac artery and angioplasty to the left SFA.    Follows with Dr. Rodriguez    5. Hyperlipidemia    Most recent LDL was 61    Continue atorvastatin 40 mg daily         History of Presenting Illness:    Dustin Pearce is a very pleasant 89 year old patient of Dr. Samuel who presents today for a hospital discharge follow up.  He has a past medical history significant for diabetes, hypertension, dyslipidemia, anxiety, depression, prior TIA, ischemic cardiomyopathy, chronic systolic congestive heart failure, coronary artery disease (status post recent stenting to the proximal LAD, proximal circumflex and  of the RCA).  He also has a history of peripheral arterial disease status post right carotid endarterectomy, left carotid occlusion and status post left common iliac artery stenting and left SFA angioplasty.    She was admitted to [facility] from 5/26/2018 to 5/31/2018 with a non-ST elevation MI.  He  underwent cardiac catheterization that showed severe three-vessel disease including  of the RCA, severe proximal LAD and severe proximal left circumflex disease.  He underwent a drug-eluting stent to the LAD and circumflex.  The cath was performed by a right femoral approach.  He was noted to have severe left subclavian and axillary artery disease.  An echocardiogram revealed that his LVEF was estimated at 35-40% with severe inferolateral wall hypokinesis.  There is moderate inferior wall hypokinesis as well.  The RV was normal size and function and the LA was mildly dilated.  There was mild to moderate aortic stenosis.    He was discharged home on 5/31/2018.  When he returned home, he had recurrent chest discomfort that was different than his initial presentation.  He represented to the hospital no additional cardiac interventions were completed.  His proBNP was elevated at greater than 10,000 and his troponin peaked at 0.133.  He was diuresed with oral Lasix and discharged to TCU at [place] on 20 mg of Lasix daily.    Today in clinic he presents with his wife, Halina.  He is currently still residing at [place] undergoing therapy.  He denies any chest discomfort, shortness of breath, recurrent dizziness, PND orthopnea.  He has no lower extremity edema.          This note was completed in part using Dragon voice recognition software. Although reviewed after completion, some word and grammatical errors may occur       Review of Systems:   Review of Systems:  Skin:  Positive for bruising   Eyes:  Positive for glasses  ENT:  Positive for hearing loss  Respiratory:  Positive for shortness of breath;cough  Cardiovascular:  Negative for;palpitations;chest pain;edema dizziness;Positive for  Gastroenterology: Positive for heartburn;reflux  Genitourinary:  Negative    Musculoskeletal:  Positive for arthritis  Neurologic:  Positive for stroke  Psychiatric:  Negative    Heme/Lymph/Imm:  Negative    Endocrine:  Positive for  "diabetes              Physical Exam:     Vitals: /50 (BP Location: Right arm, Patient Position: Chair, Cuff Size: Adult Regular)  Pulse 53  Ht 1.651 m (5' 5\")  Wt 73.8 kg (162 lb 12.8 oz)  SpO2 94%  BMI 27.09 kg/m2  Constitutional:  cooperative, alert and oriented, well developed, well nourished, in no acute distress        Skin:  warm and dry to the touch, no apparent skin lesions or masses noted        Head:  normocephalic, no masses or lesions        Eyes:  pupils equal and round;conjunctivae and lids unremarkable;sclera white        ENT:  no pallor or cyanosis, dentition good        Neck:  JVP normal   No L carotid pulse    Chest:  normal breath sounds, clear to auscultation, normal A-P diameter, normal symmetry, normal respiratory excursion, no use of accessory muscles        Cardiac: regular rhythm                  Abdomen:  abdomen soft        Vascular:   pulses below the femoral arteries are diminished                                    Extremities and Back:  no edema        Neurological:  no gross motor deficits               Medications:     Current Outpatient Prescriptions   Medication Sig Dispense Refill     AMITRIPTYLINE HCL PO Take 25 mg by mouth nightly as needed for sleep       aspirin EC 81 MG EC tablet Take 1 tablet (81 mg) by mouth daily 30 tablet 0     ATORVASTATIN CALCIUM PO Take 40 mg by mouth At Bedtime        bisacodyl (DULCOLAX) 10 MG Suppository Place 10 mg rectally daily as needed for constipation       blood glucose monitoring (TRUE METRIX BLOOD GLUCOSE TEST) test strip USE TO TEST BLOOD SUGAR THREE TIMES DAILY OR AS DIRECTED 300 strip 1     Cholecalciferol 73463 UNITS TABS Take 1 tablet by mouth three times a week (Mon, Wed, Fri)       Clopidogrel Bisulfate, 6549461399, (PLAVIX PO) Take 75 mg by mouth daily        DULoxetine (CYMBALTA) 30 MG EC capsule Take 1 capsule (30 mg) by mouth daily 90 capsule 3     ferrous gluconate (FERGON) 324 (38 Fe) MG tablet Take 1 tablet (324 mg) " by mouth daily (with breakfast) 100 tablet 0     furosemide (LASIX) 20 MG tablet Take 1 tablet (20 mg) by mouth every other day Hold if systolic bp < 100 or patients appears dehydrated or having poor oral intake 30 tablet 0     glipiZIDE (GLUCOTROL) 10 MG tablet Take 1 tablet (10 mg) by mouth 2 times daily (before meals) 180 tablet 3     insulin glargine (LANTUS SOLOSTAR) 100 UNIT/ML pen Inject 14 Units Subcutaneous At Bedtime 10 mL 0     ipratropium (ATROVENT) 0.03 % spray INHALE 1 SPRAY IN EACH NOSTRIL EVERY 12 HOURS AS NEEDED 30 mL 0     isosorbide mononitrate (IMDUR) 30 MG 24 hr tablet Take 2 tablets (60 mg) by mouth daily 60 tablet 0     lisinopril (PRINIVIL/ZESTRIL) 5 MG tablet Take 1 tablet (5 mg) by mouth daily 30 tablet 1     magnesium oxide (MAG-OX) 400 (241.3 Mg) MG tablet TAKE 1 TABLET BY MOUTH TWICE DAILY 60 tablet 0     metoprolol tartrate (LOPRESSOR) 50 MG tablet Take 1 tablet (50 mg) by mouth 2 times daily 60 tablet 1     nitroGLYcerin (NITROSTAT) 0.4 MG sublingual tablet For chest pain place 1 tablet under the tongue every 5 minutes for 3 doses. If symptoms persist 5 minutes after 1st dose call 911. 25 tablet 0     OMEPRAZOLE PO Take 40 mg by mouth 2 times daily        order for DME Equipment being ordered: True Matrix Blood Glucose meter. 1 Act 11     polyethylene glycol (MIRALAX/GLYCOLAX) Packet Take 17 g by mouth daily as needed for constipation       tamsulosin (FLOMAX) 0.4 MG capsule Take 1 capsule (0.4 mg) by mouth daily 90 capsule 3           Family History   Problem Relation Age of Onset     Breast Cancer Mother      Heart Failure Father 81       Social History     Social History     Marital status:      Spouse name: N/A     Number of children: N/A     Years of education: N/A     Occupational History     Not on file.     Social History Main Topics     Smoking status: Former Smoker     Packs/day: 1.00     Years: 30.00     Types: Cigarettes     Quit date: 1/1/1999     Smokeless  tobacco: Never Used     Alcohol use 4.2 oz/week     7 Standard drinks or equivalent per week      Comment: 1 drink per biweekly     Drug use: No     Sexual activity: Not Currently     Partners: Female     Other Topics Concern     Not on file     Social History Narrative            Past Medical History:     Past Medical History:   Diagnosis Date     Aortic root dilatation (H)      Aortic stenosis      Benign essential hypertension 1/6/2017     Benign non-nodular prostatic hyperplasia with lower urinary tract symptoms 10/20/2016     CAD (coronary artery disease)     MI 1970     Cardiomyopathy (H)      Carotid artery stenosis 10/20/2016    chronically occluded left internal carotid artery     Carotid occlusion, left      Coronary artery disease involving native coronary artery of native heart without angina pectoris 10/20/2016     Diabetes mellitus (H)      DJD (degenerative joint disease)      Gastro-oesophageal reflux disease      Gastroesophageal reflux disease without esophagitis 10/20/2016     Heart attack      Hyperlipidemia      Hypertension      Mild cognitive impairment 10/20/2016     Nephrolithiasis     RECURRENT     Osteopenia      PAD (peripheral artery disease) (H)     peripheral angiogram 5/2017: The common iliac artery stenoses were stented and the superficial femoral artery stenoses were angioplastied     Paroxysmal atrial fibrillation (H)      PONV (postoperative nausea and vomiting)      RBBB with left anterior fascicular block      Unspecified cerebral artery occlusion with cerebral infarction               Past Surgical History:     Past Surgical History:   Procedure Laterality Date     AMPUTATE FOOT Left 6/4/2017    Procedure: AMPUTATE FOOT;  Sun Add#3: partial left foot amputation - Sagital Saw - Mini C-Arm;  Surgeon: Moe Kirk DPM;  Location: [facility] OR     CHOLECYSTECTOMY       ENDARTERECTOMY CAROTID Right 1/3/2018    Procedure: ENDARTERECTOMY CAROTID;  RIGHT CAROTID ENDARTERECTOMY WITH  EEG;  Surgeon: Roland Rodriguez MD;  Location:  OR     ESOPHAGOSCOPY, GASTROSCOPY, DUODENOSCOPY (EGD), COMBINED N/A 12/26/2016    Procedure: COMBINED ESOPHAGOSCOPY, GASTROSCOPY, DUODENOSCOPY (EGD);  Surgeon: Nasim Louis MD;  Location:  GI     GENITOURINARY SURGERY      KIDNEY STONE REMOVAL X 4     HC UGI ENDOSCOPY W EUS N/A 12/29/2016    Procedure: COMBINED ENDOSCOPIC ULTRASOUND, ESOPHAGOSCOPY, GASTROSCOPY, DUODENOSCOPY (EGD);  Surgeon: Nasim Louis MD;  Location:  GI     HERNIA REPAIR       INCISION AND DRAINAGE FOOT, COMBINED Left 6/6/2017    Procedure: COMBINED INCISION AND DRAINAGE FOOT;  REVISIONAL LEFT FOOT INCISION AND DRAINAGE WITH POSSIBLE FLAP CLOSURE , ANTIBIOTIC SOLUTION ;  Surgeon: Nabil Ann DPM;  Location:  OR     LASER HOLMIUM LITHOTRIPSY URETER(S), INSERT STENT, COMBINED  3/2/2012    Procedure:COMBINED CYSTOSCOPY, URETEROSCOPY, LASER HOLMIUM LITHOTRIPSY URETER(S), INSERT STENT; CYSTOSCOPY, RIGHT RETROGRADES, RIGHT URETEROSCOPY, HOLMIUM LASER, RIGHT STENT PLACEMENT; Surgeon:ANJELICA LEÓN; Location: OR     ROTATOR CUFF REPAIR RT/LT                Allergies:   Oxycodone; Sulfa drugs; and Tetracycline       Data:   All laboratory data reviewed:    LAST CHOLESTEROL:  Lab Results   Component Value Date    CHOL 133 12/31/2017     Lab Results   Component Value Date    HDL 43 12/31/2017     Lab Results   Component Value Date    LDL 61 12/31/2017     Lab Results   Component Value Date    TRIG 147 12/31/2017     Lab Results   Component Value Date    CHOLHDLRATIO 5.1 12/18/2015       LAST BMP:  Lab Results   Component Value Date     06/04/2018      Lab Results   Component Value Date    POTASSIUM 4.6 06/04/2018     Lab Results   Component Value Date    CHLORIDE 103 06/04/2018     Lab Results   Component Value Date    ALLISON 8.7 06/04/2018     Lab Results   Component Value Date    CO2 27 06/04/2018     Lab Results   Component Value Date    BUN 19  06/04/2018     Lab Results   Component Value Date    CR 1.17 06/04/2018     Lab Results   Component Value Date     06/04/2018       LAST CBC:  Lab Results   Component Value Date    WBC 7.5 06/04/2018     Lab Results   Component Value Date    RBC 3.38 06/04/2018     Lab Results   Component Value Date    HGB 9.8 06/04/2018     Lab Results   Component Value Date    HCT 3.5 06/04/2018     Lab Results   Component Value Date    MCV 93 06/04/2018     Lab Results   Component Value Date    MCH 29.0 06/04/2018     Lab Results   Component Value Date    MCHC 31.1 06/04/2018     Lab Results   Component Value Date    RDW 14.8 06/04/2018     Lab Results   Component Value Date     06/04/2018         ASPEN CASTRO, CNP

## 2018-06-12 ENCOUNTER — NURSING HOME VISIT (OUTPATIENT)
Dept: GERIATRICS | Facility: CLINIC | Age: 83
End: 2018-06-12
Payer: MEDICARE

## 2018-06-12 VITALS
OXYGEN SATURATION: 92 % | SYSTOLIC BLOOD PRESSURE: 123 MMHG | HEART RATE: 97 BPM | DIASTOLIC BLOOD PRESSURE: 54 MMHG | WEIGHT: 155.8 LBS | BODY MASS INDEX: 22.3 KG/M2 | HEIGHT: 70 IN | TEMPERATURE: 95 F | RESPIRATION RATE: 18 BRPM

## 2018-06-12 DIAGNOSIS — E11.42 DM TYPE 2 WITH DIABETIC PERIPHERAL NEUROPATHY (H): ICD-10-CM

## 2018-06-12 DIAGNOSIS — R41.89 COGNITIVE IMPAIRMENT: ICD-10-CM

## 2018-06-12 DIAGNOSIS — I25.10 CORONARY ARTERY DISEASE INVOLVING NATIVE CORONARY ARTERY OF NATIVE HEART WITHOUT ANGINA PECTORIS: ICD-10-CM

## 2018-06-12 DIAGNOSIS — D64.9 ANEMIA, UNSPECIFIED TYPE: ICD-10-CM

## 2018-06-12 DIAGNOSIS — I48.0 PAROXYSMAL ATRIAL FIBRILLATION (H): ICD-10-CM

## 2018-06-12 DIAGNOSIS — I50.23 ACUTE ON CHRONIC SYSTOLIC CONGESTIVE HEART FAILURE (H): Primary | ICD-10-CM

## 2018-06-12 DIAGNOSIS — R53.81 PHYSICAL DECONDITIONING: ICD-10-CM

## 2018-06-12 DIAGNOSIS — I10 BENIGN ESSENTIAL HYPERTENSION: ICD-10-CM

## 2018-06-12 LAB
INTERPRETATION ECG - MUSE: NORMAL
INTERPRETATION ECG - MUSE: NORMAL

## 2018-06-12 PROCEDURE — 99310 SBSQ NF CARE HIGH MDM 45: CPT | Performed by: NURSE PRACTITIONER

## 2018-06-12 NOTE — PROGRESS NOTES
"[facility]    Chief Complaint   Patient presents with     MARILEE       [facility] Medical Record Number:  [id number]    HPI:    Dustin Pearce is a 89 year old  (1/31/1928), who is being seen today for an episodic care visit at [place].  HPI information obtained from: facility chart records, facility staff, patient report and [facility] chart review.    He came to this facility 6/3/2018 for short term rehab and medical management after 2 recent hospitalizations for cardiac issues. He was initially hospitalized 5/26-5/31/2018 with chest pain and NSTEMI. Cardiac cath 5/30/2018 showed severe 3 vessel disease and he underwent drug eluting stents. He discharged home with his SO 5/31/2018 and returned to the ED the next day with chest pressure and shortness of breath. BNP 10,184, troponin elevated. CXR with atelectasis left lung base, no acute process. EKG showed normal sinus rhythm with  PVCs and bifascicular block. Cardiology recommended increasing Imdur from 30 mg to 60 mg daily. ASA, Plavix, statin, metoprolol  and prn nitro continued. He was diuresed with lasix.    Lantus was started and metformin discontinued. Glipizide continued  ECHO 5/27: EF 35-40% with moderate to severe inferolateral wall hypokinesis, mild to moderate mitral and aortic valve stenosis.     .     Current issues are:          Acute on chronic systolic congestive heart failure (H)-reports feeling \"great.\" Looks much better than he did on tcu admission. Denies cough, shortness of breath or chest pain. Weight is down 6 lbs. Good appetite and denies GI symptoms. Saw Cardiology 6/8/2018 with no changes in meds.   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 Recent BPs: 132/66, 124/49, 115/57, 139/53. Denies feeling dizzy or lightheaded  Anemia, unspecified type  Cognitive impairment-SLUMS 18/30. More alert and talkative. Conversation appropriate. Lives with his SO who manages meds and assists with all cares.   Physical deconditioning-Ambulating short distances with walker and assist. Requires assist of 1 with cares. Reports feeling stronger and would like to do more in therapies.     ALLERGIES: Oxycodone; Sulfa drugs; and Tetracycline  Past Medical, Surgical, Family and Social History reviewed and updated in [facility].    Current Outpatient Prescriptions   Medication Sig Dispense Refill     AMITRIPTYLINE HCL PO Take 25 mg by mouth nightly as needed for sleep       aspirin EC 81 MG EC tablet Take 1 tablet (81 mg) by mouth daily 30 tablet 0     ATORVASTATIN CALCIUM PO Take 40 mg by mouth At Bedtime        bisacodyl (DULCOLAX) 10 MG Suppository Place 10 mg rectally daily as needed for constipation       blood glucose monitoring (TRUE METRIX BLOOD GLUCOSE TEST) test strip USE TO TEST BLOOD SUGAR THREE TIMES DAILY OR AS DIRECTED 300 strip 1     Cholecalciferol 98501 UNITS TABS Take 1 tablet by mouth three times a week (Mon, Wed, Fri)       Clopidogrel Bisulfate, 7768800252, (PLAVIX PO) Take 75 mg by mouth daily        DULoxetine (CYMBALTA) 30 MG EC capsule Take 1 capsule (30 mg) by mouth daily 90 capsule 3     ferrous gluconate (FERGON) 324 (38 Fe) MG tablet Take 1 tablet (324 mg) by mouth daily (with breakfast) 100 tablet 0     furosemide (LASIX) 20 MG tablet Take 1 tablet (20 mg) by mouth every other day Hold if systolic bp < 100 or patients appears dehydrated or having poor oral intake 30 tablet 0     glipiZIDE (GLUCOTROL) 10 MG tablet Take 1 tablet (10 mg) by mouth 2 times daily (before meals) 180 tablet 3     insulin glargine (LANTUS SOLOSTAR) 100 UNIT/ML pen Inject 14 Units Subcutaneous At Bedtime 10 mL 0     ipratropium (ATROVENT) 0.03 % spray INHALE 1 SPRAY  "IN EACH NOSTRIL EVERY 12 HOURS AS NEEDED 30 mL 0     isosorbide mononitrate (IMDUR) 30 MG 24 hr tablet Take 2 tablets (60 mg) by mouth daily 60 tablet 0     lisinopril (PRINIVIL/ZESTRIL) 5 MG tablet Take 1 tablet (5 mg) by mouth daily 30 tablet 1     magnesium oxide (MAG-OX) 400 (241.3 Mg) MG tablet TAKE 1 TABLET BY MOUTH TWICE DAILY 60 tablet 0     METOPROLOL TARTRATE PO Take 25 mg by mouth 2 times daily       nitroGLYcerin (NITROSTAT) 0.4 MG sublingual tablet For chest pain place 1 tablet under the tongue every 5 minutes for 3 doses. If symptoms persist 5 minutes after 1st dose call 911. 25 tablet 0     OMEPRAZOLE PO Take 40 mg by mouth 2 times daily        order for DME Equipment being ordered: True Matrix Blood Glucose meter. 1 Act 11     polyethylene glycol (MIRALAX/GLYCOLAX) Packet Take 17 g by mouth daily as needed for constipation       tamsulosin (FLOMAX) 0.4 MG capsule Take 1 capsule (0.4 mg) by mouth daily 90 capsule 3     Medications reviewed:  Medications reconciled to facility chart and changes were made to reflect current medications as identified as above med list. Below are the changes that were made:   Medications stopped since last EPIC medication reconciliation:   Medications Discontinued During This Encounter   Medication Reason     metoprolol tartrate (LOPRESSOR) 50 MG tablet        Medications started since last [facility] medication reconciliation:  Orders Placed This Encounter   Medications     METOPROLOL TARTRATE PO     Sig: Take 25 mg by mouth 2 times daily     REVIEW OF SYSTEMS:  10 point ROS of systems including Constitutional, Eyes, Respiratory, Cardiovascular, Gastroenterology, Genitourinary, Integumentary, Muscularskeletal, Psychiatric were all negative except for pertinent positives noted in my HPI.    Physical Exam:  /54  Pulse 97  Temp 95  F (35  C)  Resp 18  Ht 5' 10\" (1.778 m)  Wt 155 lb 12.8 oz (70.7 kg)  SpO2 92%  BMI 22.35 kg/m2  GENERAL APPEARANCE:  Alert, in no " distress  ENT:  Mouth and posterior oropharynx normal, moist mucous membranes, [place]  EYES:  EOM normal, conjunctiva and lids normal  NECK:  No adenopathy,masses or thyromegaly  RESP:  respiratory effort and palpation of chest normal, no respiratory distress, lungs clear to auscultation bilaterally, no crackles or wheezes  CV:  Palpation and auscultation of heart done , regular rate and rhythm, 2/6 murmur, no edema, +2 pedal pulses  ABDOMEN:  soft, nontender, no hepatosplenomegaly or other masses  M/S:   gait steady with walker.  FERNANDEZ with good strength. No joint inflammation  SKIN:  no rashes or open areas  PSYCH:  oriented to self, place, situation,  memory impaired , affect and mood normal    Recent Labs:   Last Basic Metabolic Panel:  Lab Results   Component Value Date     06/04/2018      Lab Results   Component Value Date    POTASSIUM 4.6 06/04/2018     Lab Results   Component Value Date    CHLORIDE 103 06/04/2018     Lab Results   Component Value Date    ALLISON 8.7 06/04/2018     Lab Results   Component Value Date    CO2 27 06/04/2018     Lab Results   Component Value Date    BUN 19 06/04/2018     Lab Results   Component Value Date    CR 1.17 06/04/2018     Lab Results   Component Value Date     06/04/2018     Lab Results   Component Value Date    WBC 7.5 06/04/2018     Lab Results   Component Value Date    RBC 3.38 06/04/2018     Lab Results   Component Value Date    HGB 9.8 06/04/2018     Lab Results   Component Value Date    HCT 3.5 06/04/2018     Lab Results   Component Value Date    MCV 93 06/04/2018     Lab Results   Component Value Date    MCH 29.0 06/04/2018     Lab Results   Component Value Date    MCHC 31.1 06/04/2018     Lab Results   Component Value Date    RDW 14.8 06/04/2018     Lab Results   Component Value Date     06/04/2018     ASSESSMENT / PLAN:  (I50.23) Acute on chronic systolic congestive heart failure (H)  (primary encounter diagnosis)  Comment: compensated Feels and looks  much better over the past week.   Plan: continue lasix, metoprolol, lisinopril. BMP. Daily weight. Low sodium diet. Follow up [facility] as scheduled.     (I25.10) Coronary artery disease involving native coronary artery of native heart without angina pectoris  NSTEMI  Comment: s/p stent placement. No acute issues  Plan: continue ASA, Plavix, statin, Imdur, metoprolol. Cardiology follow up as scheduled.     (I48.0) Paroxysmal atrial fibrillation (H)  Comment: rate controlled.   Plan: continue metoprolol. Anticoagulated with ASA and Plavix.    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 Monitor blood sugars    (I10) Benign essential hypertension  Comment: improved BPs with no recent hypotension  Plan: continue metoprolol, lasix, lisinopril, Imdur, metoprolol. Monitor VS. BMP    (D64.9) Anemia, unspecified type  Comment: chronic. No s/s of active bleeding  Plan: Hgb. Continue ferrous gluconate, PPI    (R41.89) Cognitive impairment  Comment: appears to have moderate deficits, although is more alert and interactive today  Plan: cognitive testing in progress    (R53.81) Physical deconditioning  Comment: progressing in therapies  Plan: continue PHYSICAL THERAPY/OT. Goal is to return home with services.         Electronically signed by  ASPEN Shaikh CNP

## 2018-06-13 ENCOUNTER — TRANSFERRED RECORDS (OUTPATIENT)
Dept: HEALTH INFORMATION MANAGEMENT | Facility: CLINIC | Age: 83
End: 2018-06-13

## 2018-06-13 LAB
ANION GAP SERPL CALCULATED.3IONS-SCNC: 6 MMOL/L (ref 3–14)
BUN SERPL-MCNC: 23 MG/DL (ref 7–30)
CALCIUM SERPL-MCNC: 9.1 MG/DL (ref 8.5–10.1)
CHLORIDE SERPLBLD-SCNC: 104 MMOL/L (ref 94–109)
CO2 SERPL-SCNC: 29 MMOL/L (ref 20–32)
CREAT SERPL-MCNC: 0.91 MG/DL (ref 0.66–1.25)
GFR SERPL CREATININE-BSD FRML MDRD: 78 ML/MIN/1.7M2
GLUCOSE SERPL-MCNC: 129 MG/DL (ref 70–99)
HEMOGLOBIN: 10.6 G/DL (ref 13.3–17.7)
POTASSIUM SERPL-SCNC: 4.5 MMOL/L (ref 3.4–5.3)
SODIUM SERPL-SCNC: 139 MMOL/L (ref 133–144)

## 2018-06-15 ENCOUNTER — DISCHARGE SUMMARY NURSING HOME (OUTPATIENT)
Dept: GERIATRICS | Facility: CLINIC | Age: 83
End: 2018-06-15
Payer: MEDICARE

## 2018-06-15 VITALS
DIASTOLIC BLOOD PRESSURE: 56 MMHG | OXYGEN SATURATION: 97 % | BODY MASS INDEX: 22.22 KG/M2 | RESPIRATION RATE: 16 BRPM | HEART RATE: 52 BPM | WEIGHT: 155.2 LBS | HEIGHT: 70 IN | SYSTOLIC BLOOD PRESSURE: 96 MMHG | TEMPERATURE: 95.2 F

## 2018-06-15 DIAGNOSIS — I25.5 ISCHEMIC CARDIOMYOPATHY: ICD-10-CM

## 2018-06-15 DIAGNOSIS — R41.89 COGNITIVE IMPAIRMENT: ICD-10-CM

## 2018-06-15 DIAGNOSIS — E11.42 DM TYPE 2 WITH DIABETIC PERIPHERAL NEUROPATHY (H): ICD-10-CM

## 2018-06-15 DIAGNOSIS — I50.23 ACUTE ON CHRONIC SYSTOLIC CONGESTIVE HEART FAILURE (H): Primary | ICD-10-CM

## 2018-06-15 DIAGNOSIS — N40.0 BENIGN PROSTATIC HYPERPLASIA WITHOUT LOWER URINARY TRACT SYMPTOMS: ICD-10-CM

## 2018-06-15 DIAGNOSIS — D64.9 ANEMIA, UNSPECIFIED TYPE: ICD-10-CM

## 2018-06-15 DIAGNOSIS — R53.81 PHYSICAL DECONDITIONING: ICD-10-CM

## 2018-06-15 DIAGNOSIS — I48.0 PAROXYSMAL ATRIAL FIBRILLATION (H): ICD-10-CM

## 2018-06-15 DIAGNOSIS — I25.10 CORONARY ARTERY DISEASE INVOLVING NATIVE CORONARY ARTERY OF NATIVE HEART WITHOUT ANGINA PECTORIS: ICD-10-CM

## 2018-06-15 DIAGNOSIS — I21.4 NSTEMI (NON-ST ELEVATED MYOCARDIAL INFARCTION) (H): ICD-10-CM

## 2018-06-15 DIAGNOSIS — I10 BENIGN ESSENTIAL HYPERTENSION: ICD-10-CM

## 2018-06-15 PROCEDURE — 99316 NF DSCHRG MGMT 30 MIN+: CPT | Performed by: NURSE PRACTITIONER

## 2018-06-15 NOTE — PROGRESS NOTES
"  [place] [facility] DISCHARGE SUMMARY    PATIENT'S NAME: Dustin Pearce  YOB: 1928  MEDICAL RECORD NUMBER:  [id number]    PRIMARY CARE PROVIDER AND CLINIC RESPONSIBLE AFTER TRANSFER: Yuni Matt CHARLES [address] / [place] [address]     CODE STATUS/ADVANCE DIRECTIVES DISCUSSION:   CPR/Full code        Allergies   Allergen Reactions     Oxycodone Other (See Comments)     \"TERRIBLE SWEATING\"     Sulfa Drugs      Tetracycline        TRANSFERRING PROVIDERS: ASPEN Shaikh CNP, Chula Barone MD  DATE OF SNF ADMISSION:  June / 03 / 2018  DATE OF SNF (anticipated) DISCHARGE: June / 17 / 2018  DISCHARGE DISPOSITION: FM Provider   Nursing Facility: [facility] stay 06/01/2018 to 06/03/2018.     Condition on Discharge:  Improving.  Cognitive Scores: MoCA 11/22, CPT med box 4/6, Safety questionnaire 18/22  Equipment: walker    DISCHARGE DIAGNOSIS:   1. Acute on chronic systolic congestive heart failure (H)    2. Ischemic cardiomyopathy    3. NSTEMI (non-ST elevated myocardial infarction) (H)    4. Coronary artery disease involving native coronary artery of native heart without angina pectoris    5. Paroxysmal atrial fibrillation (H)    6. 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 Benign essential hypertension    8. Anemia, unspecified type    9. Benign prostatic hyperplasia without lower urinary tract symptoms    10. Cognitive impairment    11. Physical deconditioning        HPI Nursing Facility Course:  HPI information obtained from: facility chart records, facility staff, patient report and [facility] chart review.  He came to this facility for short term rehab and medical management after 2 recent hospitalizations for cardiac issues. He was initially hospitalized 5/26-5/31/2018 with chest pain and NSTEMI. Cardiac cath 5/30/2018 showed severe 3 vessel disease and he underwent drug eluting stents. He discharged " home with his SO 5/31/2018 and returned to the ED the next day with chest pressure and shortness of breath. BNP 10,184, troponin elevated. CXR with atelectasis left lung base, no acute process. EKG showed normal sinus rhythm with  PVCs and bifascicular block. Cardiology recommended increasing Imdur from 30 mg to 60 mg daily. ASA, Plavix, statin, metoprolol  and prn nitro continued. He was diuresed with lasix. Metformin discontinued. Glipizide and Lantus continued  ECHO 5/27: EF 35-40% with moderate to severe inferolateral wall hypokinesis, mild to moderate mitral and aortic valve stenosis.      He has worked with PHYSICAL THERAPY and OT with good progress. Ambulating 450 ft with walker and stand by assist. TUG 16.92 seconds, indicating low fall risk. He requires supervision to stand by assist with bathing and dressing. Independent with toileting.   ASSESSMENT / PLAN:  (I50.23) Acute on chronic systolic congestive heart failure (H)  (primary encounter diagnosis)  (I25.5) Ischemic cardiomyopathy  Comment: compensated. BPs have stabilized after med adjustments.  Weight is down 6 lbs from admission and has stabilized at 155 lbs. He saw Cardiology 6/8/2018 with no changes in meds. He's feeling well and eager to return home.   Plan: discharge home with his SO, who assists with cares and manages meds. Continue lasix, metoprolol, lisinopril. Daily weight. Low sodium diet. Home services and follow up appts as below.     (I21.4) NSTEMI (non-ST elevated myocardial infarction) (H)  (I25.10) Coronary artery disease involving native coronary artery of native heart without angina pectoris  Comment: s/p stent placement. No acute issues.   Plan: continue ASA, Plavix, statin, Imdur, metoprolol, lisinopril, lasix.     (I48.0) Paroxysmal atrial fibrillation (H)  Comment: rate controlled: 52-63  Plan: continue metoprolol. Anticoagulated with Plavix and ASA.     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 His SO is comfortable with checking blood sugars and administering insulin.     (I10) Benign essential hypertension  Comment: improved with recent BPs: 113/58, 132/66, 124/49. BP lower this am 96/56,  asymptomatic.   Plan: continue current meds. Monitor VS. Follow up with PCP and Cardiology.     (D64.9) Anemia, unspecified type  Comment: improved  Plan: continue ferrous gluconate, PPI. Follow up labs per PCP    (N40.0) Benign prostatic hyperplasia without lower urinary tract symptoms  Comment: chronic  Plan: continue flomax    (R41.89) Cognitive impairment  Comment: moderate deficits on cognitive testing  Plan: supportive care    (R53.81) Physical deconditioning  Comment: progress in therapies as above  Plan: home therapies for ongoing gait training, strengthening and ADL safety      PAST MEDICAL HISTORY:  has a past medical history of Aortic root dilatation (H); Aortic stenosis; Benign essential hypertension (1/6/2017); Benign non-nodular prostatic hyperplasia with lower urinary tract symptoms (10/20/2016); CAD (coronary artery disease); Cardiomyopathy (H); Carotid artery stenosis (10/20/2016); Carotid occlusion, left; Coronary artery disease involving native coronary artery of native heart without angina pectoris (10/20/2016); Diabetes mellitus (H); DJD (degenerative joint disease); Gastro-oesophageal reflux disease; Gastroesophageal reflux disease without esophagitis (10/20/2016); Heart attack; Hyperlipidemia; Hypertension; Mild cognitive impairment (10/20/2016); Nephrolithiasis; Osteopenia; PAD (peripheral artery disease) (H); Paroxysmal atrial fibrillation (H); PONV (postoperative nausea and vomiting); RBBB with left anterior fascicular block; and Unspecified cerebral artery occlusion with cerebral infarction. He also has no past medical history of Difficult intubation or Malignant hyperthermia.    DISCHARGE  MEDICATIONS:  Current Outpatient Prescriptions   Medication Sig Dispense Refill     AMITRIPTYLINE HCL PO Take 25 mg by mouth nightly as needed for sleep       aspirin EC 81 MG EC tablet Take 1 tablet (81 mg) by mouth daily 30 tablet 0     ATORVASTATIN CALCIUM PO Take 40 mg by mouth At Bedtime        bisacodyl (DULCOLAX) 10 MG Suppository Place 10 mg rectally daily as needed for constipation       blood glucose monitoring (TRUE METRIX BLOOD GLUCOSE TEST) test strip USE TO TEST BLOOD SUGAR THREE TIMES DAILY OR AS DIRECTED 300 strip 1     Cholecalciferol 51510 UNITS TABS Take 1 tablet by mouth three times a week (Mon, Wed, Fri)       Clopidogrel Bisulfate, 2458100636, (PLAVIX PO) Take 75 mg by mouth daily        DULoxetine (CYMBALTA) 30 MG EC capsule Take 1 capsule (30 mg) by mouth daily 90 capsule 3     ferrous gluconate (FERGON) 324 (38 Fe) MG tablet Take 1 tablet (324 mg) by mouth daily (with breakfast) 100 tablet 0     furosemide (LASIX) 20 MG tablet Take 1 tablet (20 mg) by mouth every other day Hold if systolic bp < 100 or patients appears dehydrated or having poor oral intake 30 tablet 0     glipiZIDE (GLUCOTROL) 10 MG tablet Take 1 tablet (10 mg) by mouth 2 times daily (before meals) 180 tablet 3     insulin glargine (LANTUS SOLOSTAR) 100 UNIT/ML pen Inject 14 Units Subcutaneous At Bedtime 10 mL 0     ipratropium (ATROVENT) 0.03 % spray INHALE 1 SPRAY IN EACH NOSTRIL EVERY 12 HOURS AS NEEDED 30 mL 0     isosorbide mononitrate (IMDUR) 30 MG 24 hr tablet Take 2 tablets (60 mg) by mouth daily 60 tablet 0     lisinopril (PRINIVIL/ZESTRIL) 5 MG tablet Take 1 tablet (5 mg) by mouth daily 30 tablet 1     magnesium oxide (MAG-OX) 400 (241.3 Mg) MG tablet TAKE 1 TABLET BY MOUTH TWICE DAILY 60 tablet 0     METOPROLOL TARTRATE PO Take 25 mg by mouth 2 times daily       nitroGLYcerin (NITROSTAT) 0.4 MG sublingual tablet For chest pain place 1 tablet under the tongue every 5 minutes for 3 doses. If symptoms persist 5  "minutes after 1st dose call 911. 25 tablet 0     OMEPRAZOLE PO Take 40 mg by mouth 2 times daily        order for DME Equipment being ordered: True Matrix Blood Glucose meter. 1 Act 11     polyethylene glycol (MIRALAX/GLYCOLAX) Packet Take 17 g by mouth daily as needed for constipation       tamsulosin (FLOMAX) 0.4 MG capsule Take 1 capsule (0.4 mg) by mouth daily 90 capsule 3       MEDICATION CHANGES/RATIONALE:   Metoprolol decreased  Lisinopril and flomax administration time changed to HS  Controlled medications sent with patient:   not applicable/none     ROS:    4 point ROS including Respiratory, CV, GI and , other than that noted in the HPI,  is negative    Physical Exam:   Vitals: BP 96/56  Pulse 52  Temp 95.2  F (35.1  C)  Resp 16  Ht 5' 10\" (1.778 m)  Wt 155 lb 3.2 oz (70.4 kg)  SpO2 97%  BMI 22.27 kg/m2  BMI= Body mass index is 22.27 kg/(m^2).    GENERAL APPEARANCE:  Alert, in no distress  ENT:  Mouth and posterior oropharynx normal, moist mucous membranes, [place]  EYES:  EOM normal, conjunctiva and lids normal  NECK:  No adenopathy,masses or thyromegaly  RESP:  respiratory effort and palpation of chest normal, no respiratory distress, lungs clear to auscultation bilaterally, no crackles or wheezes  CV:  Palpation and auscultation of heart done , regular rate and rhythm, 2/6 murmur, no edema, +2 pedal pulses  ABDOMEN:  soft, nontender, no hepatosplenomegaly or other masses  M/S:   gait steady with walker.  FERNANDEZ with good strength. No joint inflammation  SKIN:  no rashes or open areas  PSYCH:  oriented x 3 memory impaired , affect and mood normal    DISCHARGE PLAN:  Occupational Therapy, Physical Therapy, Registered Nurse, Home Health Aide and From:  [facility]  Patient instructed to follow-up with:  PCP in 7 days      Current [place] scheduled appointments:  Future Appointments  Date Time Provider Department Center   7/12/2018 6:30 PM [facility]   8/31/2018 10:45 AM Lizzie, " MD [facility]       MTM referral needed and placed by this provider: No    Pending labs: None  SNF labs   Last Basic Metabolic Panel:  Lab Results   Component Value Date     06/13/2018      Lab Results   Component Value Date    POTASSIUM 4.5 06/13/2018     Lab Results   Component Value Date    CHLORIDE 104 06/13/2018     Lab Results   Component Value Date    ALLISON 9.1 06/13/2018     Lab Results   Component Value Date    CO2 29 06/13/2018     Lab Results   Component Value Date    BUN 23 06/13/2018     Lab Results   Component Value Date    CR 0.91 06/13/2018     Lab Results   Component Value Date     06/13/2018     Lab Results   Component Value Date    WBC 7.5 06/04/2018     Lab Results   Component Value Date    RBC 3.38 06/04/2018     Lab Results   Component Value Date    HGB 10.6 06/13/2018     Lab Results   Component Value Date    HCT 3.5 06/04/2018     Lab Results   Component Value Date    MCV 93 06/04/2018     Lab Results   Component Value Date    MCH 29.0 06/04/2018     Lab Results   Component Value Date    MCHC 31.1 06/04/2018     Lab Results   Component Value Date    RDW 14.8 06/04/2018     Lab Results   Component Value Date     06/04/2018     Discharge Treatments: Monitor blood sugars per usual home routine.     TOTAL DISCHARGE TIME:   Greater than 30 minutes  Electronically signed by:  ASPEN Shaikh CNP

## 2018-06-18 ENCOUNTER — PATIENT OUTREACH (OUTPATIENT)
Dept: CARE COORDINATION | Facility: CLINIC | Age: 83
End: 2018-06-18

## 2018-06-18 ENCOUNTER — TELEPHONE (OUTPATIENT)
Dept: FAMILY MEDICINE | Facility: CLINIC | Age: 83
End: 2018-06-18

## 2018-06-18 NOTE — LETTER
[place] CARE COORDINATION  [facility]     June 21, 2018    Dustin Pearce  [address] [address] [place] [address]      Dear Dustin (Halina),    I am a clinic care coordinator who works with Matt Morrissey MD at [facility]. I wanted to thank you for spending the time to talk with me.  I wanted to provide you with my contact information so that you can call me with questions or concerns about your health care. Below is a description of clinic care coordination and how I can further assist you.     The clinic care coordinator is a registered nurse and/or  who understand the health care system. The goal of clinic care coordination is to help you manage your health and improve access to the [facility] system in the most efficient manner. The registered nurse can assist you in meeting your health care goals by providing education, coordinating services, and strengthening the communication among your providers. The  can assist you with financial, behavioral, psychosocial, chemical dependency, counseling, and/or psychiatric resources.    Please feel free to contact me at [phone], with any questions or concerns. We at [facility] are focused on providing you with the highest-quality healthcare experience possible and that all starts with you.     Sincerely,     Tana Estrada RN

## 2018-06-18 NOTE — TELEPHONE ENCOUNTER
Acute On Chronic Systolic Congestive Heart Failure (H) 06/17/2018 6 mo ED/IP 0/3  [phone] (home) none (work)

## 2018-06-18 NOTE — LETTER
[facility]  Complex Care Plan  About Me  Patient Name:  Dustin Pearce    YOB: 1928  Age:     89 year old   [place] MRN:   [id number] Telephone Information:    Home Phone [phone]   Mobile none       Address:    [address]  [place] [address] Email address:  [email or web address]      Emergency Contact(s)  Name Relationship Lgl Grd Work Phone Home Phone Mobile Phone   1. DOLORES SMYTH Friend  [phone] none none           Primary language:  English     needed? No   [employer]:  [phone] op. 1  Other communication barriers: Cognitive impairment  Preferred Method of Communication:  Mail  Current living arrangement: I live in a private home, I live in a private home with spouse  Mobility Status/ Medical Equipment: Independent w/Device    Health Maintenance  Health Maintenance Reviewed: Due/Overdue   My Access Plan  Medical Emergency 911   Primary Clinic Line [facility] - [phone]   24 Hour Appointment Line [phone] or  [phone]) (toll-free)   24 Hour Nurse Line [phone] (toll-free)   Preferred Urgent Care [facility], [phone]   Preferred Hospital [facility]  [phone]   Preferred Pharmacy [facility] [address] AT [facility] [address]     Behavioral Health Crisis Line The National Suicide Prevention Lifeline at 1-696.195.8435 or 911     My Care Team Members    Patient Care Team       Relationship Specialty Notifications Start End    Matt Morrissey MD PCP - General Internal Medicine  12/18/16     Phone: [phone] Pager: [phone] Fax: [phone] [address] [place] [address]    Matt Morrissey MD PCP - Internal Medicine Internal Medicine  11/4/16     Phone: [phone] Pager: [phone] Fax: [phone] [address] [place] [address]    Tana Estrada, VAN  Lead Care Coordinator Primary Care - CC  6/4/18     Phone: [phone] Fax: [phone]        Hospice, [facility]   [facility] ([facility] ([place]  6/9/18     Fax: [phone]          [address] [place] [address]    Care, [facility]   HOME HEALTH AGENCY ([facility]), ([place])  6/17/18     Phone: [phone]                 My Care Plans  Self Management and Treatment Plan  Goals and (Comments)  Goals        General    Being Active (pt-stated)     Notes - Note created  6/21/2018  2:30 PM by Tana Estrada, VAN    Goal Statement: I will walk with deana every night to help regain my strength    Measure of Success: Patient will be able to walk without a walker at the end of summer   Supportive Steps to Achieve: Education on the importance of exercise.   Barriers: Heart Failure diagnosis. Fatigue   Strengths: Significant other supportive and involved.   Date to Achieve By: September 1, 2018              Action Plans on File:                       Advance Care Plans/Directives Type:        My Medical and Care Information  Problem List   Patient Active Problem List   Diagnosis     Coronary artery disease involving native coronary artery of native heart without angina pectoris     Hyperlipidemia, unspecified hyperlipidemia type     Benign non-nodular prostatic hyperplasia with lower urinary tract symptoms     Gastroesophageal reflux disease without esophagitis     Cerebrovascular accident (H)     Carotid artery stenosis     Abdominal aortic aneurysm (H)     Lumbar back pain     Benign essential hypertension     TIA (transient ischemic attack)     Paroxysmal atrial fibrillation (H)     Ischemic cardiomyopathy     Aortic root dilatation (H)     Physical deconditioning     Diabetic ulcer of left foot associated with diabetes mellitus due to underlying condition (H)     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)     Anemia due to blood loss, acute     Diarrhea, unspecified type      Nausea and vomiting, intractability of vomiting not specified, unspecified vomiting type     Acute pain of left shoulder     Balance problems     Generalized muscle weakness     Peripheral artery disease (H)     Aortic stenosis     RBBB with left anterior fascicular block     Stroke of unknown etiology (H)     Carotid stenosis     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)     History of TIA (transient ischemic attack) and stroke     UTI (urinary tract infection)     UTI (urinary tract infection) due to Enterococcus     Generalized weakness     Type 2 diabetes mellitus with hyperglycemia, unspecified long term insulin use status (H)     Atherosclerosis of coronary artery of native heart without angina pectoris, unspecified vessel or lesion type     Depression, unspecified depression type     Slow transit constipation     Severe sepsis (H)     Chest discomfort     NSTEMI (non-ST elevated myocardial infarction) (H)     Anemia     Cognitive impairment     Acute on chronic systolic congestive heart failure (H)     Benign prostatic hyperplasia without lower urinary tract symptoms      Current Medications and Allergies:  See printed Medication Report.    Care Coordination Start Date: 6/18/2018   Frequency of Care Coordination: monthly   Form Last Updated: 06/21/2018

## 2018-06-18 NOTE — PROGRESS NOTES
Clinic Care Coordination Contact  [facility]/Voicemail       Clinical Data: Care Coordinator Outreach  Outreach attempted x 1.  Left message on voicemail with call back information and requested return call.  Plan: Care Coordinator will try to reach patient again in 1-2 business days.    Reina Ramirez RN  Clinic Care Coordinator  [phone]  [email or web address]

## 2018-06-21 NOTE — PROGRESS NOTES
SUBJECTIVE:   Dustin Pearce is a 89 year old male who presents to clinic today for the following health issues:          Hospital Follow-up Visit:    Hospital/Nursing Home/IP Rehab Facility: [facility]  Date of Admission: 6/1/18  Date of Discharge: 06/03/2018  Reason(s) for Admission: Anemia            Problems taking medications regularly:  None       Medication changes since discharge: None       Problems adhering to non-medication therapy:  None    Summary of hospitalization:  [facility] discharge summary reviewed  Diagnostic Tests/Treatments reviewed.  Follow up needed: cardiology as directed   Other Healthcare Providers Involved in Patient s Care:         None  Update since discharge: improved.     Post Discharge Medication Reconciliation: discharge medications reconciled and changed, per note/orders (see AVS).  Plan of care communicated with patient and family     Coding guidelines for this visit:  Type of Medical   Decision Making Face-to-Face Visit       within 7 Days of discharge Face-to-Face Visit        within 14 days of discharge   Moderate Complexity [id number]   High Complexity [id number]              Patient admitted with chest pain.  He received 2 coronary artery stents.  He was readmitted shortly after discharge with shortness of breath.  His symptoms improved with blood pressure control and diuresis.  He was discharged to a transitional care unit.  He was started on insulin in the hospital and this was continued at the transitional care unit.  His significant other states that his a.m. fasting blood sugars have generally been in the 130s since transitional care discharge.  She is managing well giving him evening insulin shots.  They stopped taking Byetta.  The patient denies chest pains, dyspnea, swelling since hospital or transitional care unit discharge.  He feels well and has no complaints today.    Problem list and histories reviewed & adjusted, as  indicated.  Additional history: as documented    Patient Active Problem List   Diagnosis     Coronary artery disease of native artery of native heart with stable angina pectoris (H)     Hyperlipidemia, unspecified hyperlipidemia type     Benign non-nodular prostatic hyperplasia with lower urinary tract symptoms     Gastroesophageal reflux disease without esophagitis     Cerebrovascular accident (H)     Carotid artery stenosis     Abdominal aortic aneurysm (H)     Lumbar back pain     Benign essential hypertension     TIA (transient ischemic attack)     Paroxysmal atrial fibrillation (H)     Ischemic cardiomyopathy     Aortic root dilatation (H)     Physical deconditioning     Diabetic ulcer of left foot associated with diabetes mellitus due to underlying condition (H)     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)     Anemia due to blood loss, acute     Diarrhea, unspecified type     Nausea and vomiting, intractability of vomiting not specified, unspecified vomiting type     Acute pain of left shoulder     Balance problems     Generalized muscle weakness     Peripheral artery disease (H)     Aortic stenosis     RBBB with left anterior fascicular block     Stroke of unknown etiology (H)     Carotid stenosis     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)     History of TIA (transient ischemic attack) and stroke     UTI (urinary tract infection)     UTI (urinary tract infection) due to Enterococcus     Generalized weakness     Type 2 diabetes mellitus with hyperglycemia, unspecified long term insulin use status (H)     Atherosclerosis of coronary artery of native heart without angina pectoris, unspecified vessel or lesion type     Depression, unspecified depression type     Slow transit constipation     Severe sepsis (H)     Chest discomfort     NSTEMI (non-ST elevated myocardial infarction) (H)     Anemia     Cognitive impairment     Acute on chronic systolic congestive heart failure  (H)     Benign prostatic hyperplasia without lower urinary tract symptoms     Past Surgical History:   Procedure Laterality Date     AMPUTATE FOOT Left 6/4/2017    Procedure: AMPUTATE FOOT;  Sun Add#3: partial left foot amputation - Sagital Saw - Mini C-Arm;  Surgeon: Moe Kirk DPM;  Location:  OR     CHOLECYSTECTOMY       ENDARTERECTOMY CAROTID Right 1/3/2018    Procedure: ENDARTERECTOMY CAROTID;  RIGHT CAROTID ENDARTERECTOMY WITH EEG;  Surgeon: Roland Rodriguez MD;  Location:  OR     ESOPHAGOSCOPY, GASTROSCOPY, DUODENOSCOPY (EGD), COMBINED N/A 12/26/2016    Procedure: COMBINED ESOPHAGOSCOPY, GASTROSCOPY, DUODENOSCOPY (EGD);  Surgeon: Nasim Louis MD;  Location:  GI     GENITOURINARY SURGERY      KIDNEY STONE REMOVAL X 4     HC UGI ENDOSCOPY W EUS N/A 12/29/2016    Procedure: COMBINED ENDOSCOPIC ULTRASOUND, ESOPHAGOSCOPY, GASTROSCOPY, DUODENOSCOPY (EGD);  Surgeon: Nasim Louis MD;  Location:  GI     HERNIA REPAIR       INCISION AND DRAINAGE FOOT, COMBINED Left 6/6/2017    Procedure: COMBINED INCISION AND DRAINAGE FOOT;  REVISIONAL LEFT FOOT INCISION AND DRAINAGE WITH POSSIBLE FLAP CLOSURE , ANTIBIOTIC SOLUTION ;  Surgeon: Nabil Ann DPM;  Location:  OR     LASER HOLMIUM LITHOTRIPSY URETER(S), INSERT STENT, COMBINED  3/2/2012    Procedure:COMBINED CYSTOSCOPY, URETEROSCOPY, LASER HOLMIUM LITHOTRIPSY URETER(S), INSERT STENT; CYSTOSCOPY, RIGHT RETROGRADES, RIGHT URETEROSCOPY, HOLMIUM LASER, RIGHT STENT PLACEMENT; Surgeon:ANJELICA LEÓN; Location: OR     ROTATOR CUFF REPAIR RT/LT         Social History   Substance Use Topics     Smoking status: Former Smoker     Packs/day: 1.00     Years: 30.00     Types: Cigarettes     Quit date: 1/1/1999     Smokeless tobacco: Never Used     Alcohol use 4.2 oz/week     7 Standard drinks or equivalent per week      Comment: 1 drink per biweekly     Family History   Problem Relation Age of Onset     Breast Cancer Mother       Heart Failure Father 81         Current Outpatient Prescriptions   Medication Sig Dispense Refill     AMITRIPTYLINE HCL PO Take 25 mg by mouth nightly as needed for sleep       aspirin EC 81 MG EC tablet Take 1 tablet (81 mg) by mouth daily 30 tablet 0     ATORVASTATIN CALCIUM PO Take 40 mg by mouth At Bedtime        bisacodyl (DULCOLAX) 10 MG Suppository Place 10 mg rectally daily as needed for constipation       blood glucose monitoring (TRUE METRIX BLOOD GLUCOSE TEST) test strip USE TO TEST BLOOD SUGAR THREE TIMES DAILY OR AS DIRECTED 300 strip 1     Cholecalciferol 19614 UNITS TABS Take 1 tablet by mouth three times a week (Mon, Wed, Fri)       Clopidogrel Bisulfate, [id number], (PLAVIX PO) Take 75 mg by mouth daily        DULoxetine (CYMBALTA) 30 MG EC capsule Take 1 capsule (30 mg) by mouth daily 90 capsule 3     ferrous gluconate (FERGON) 324 (38 Fe) MG tablet Take 1 tablet (324 mg) by mouth daily (with breakfast) 100 tablet 0     furosemide (LASIX) 20 MG tablet Take 1 tablet (20 mg) by mouth every other day Hold if systolic bp < 100 or patients appears dehydrated or having poor oral intake 30 tablet 0     glipiZIDE (GLUCOTROL) 10 MG tablet Take 1 tablet (10 mg) by mouth 2 times daily (before meals) 180 tablet 3     insulin glargine (LANTUS SOLOSTAR) 100 UNIT/ML pen Inject 14 Units Subcutaneous At Bedtime 10 mL 0     ipratropium (ATROVENT) 0.03 % spray INHALE 1 SPRAY IN EACH NOSTRIL EVERY 12 HOURS AS NEEDED 30 mL 0     isosorbide mononitrate (IMDUR) 30 MG 24 hr tablet Take 2 tablets (60 mg) by mouth daily 60 tablet 0     lisinopril (PRINIVIL/ZESTRIL) 5 MG tablet Take 1 tablet (5 mg) by mouth daily 30 tablet 1     magnesium oxide (MAG-OX) 400 (241.3 Mg) MG tablet TAKE 1 TABLET BY MOUTH TWICE DAILY 60 tablet 0     METOPROLOL TARTRATE PO Take 25 mg by mouth 2 times daily       nitroGLYcerin (NITROSTAT) 0.4 MG sublingual tablet For chest pain place 1 tablet under the tongue every 5 minutes for 3 doses. If  "symptoms persist 5 minutes after 1st dose call 911. 25 tablet 0     OMEPRAZOLE PO Take 40 mg by mouth 2 times daily        order for DME Equipment being ordered: True Matrix Blood Glucose meter. 1 Act 11     polyethylene glycol (MIRALAX/GLYCOLAX) Packet Take 17 g by mouth daily as needed for constipation       tamsulosin (FLOMAX) 0.4 MG capsule Take 1 capsule (0.4 mg) by mouth daily 90 capsule 3     Allergies   Allergen Reactions     Oxycodone Other (See Comments)     \"TERRIBLE SWEATING\"     Sulfa Drugs      Tetracycline        Reviewed and updated as needed this visit by clinical staff       Reviewed and updated as needed this visit by Provider         ROS:  Constitutional, HEENT, cardiovascular, pulmonary, gi and gu systems are negative, except as otherwise noted.    OBJECTIVE:     /58  Pulse 60  Temp 96.7  F (35.9  C) (Oral)  Ht 5' 10\" (1.778 m)  Wt 161 lb 14.4 oz (73.4 kg)  SpO2 90%  BMI 23.23 kg/m2  Body mass index is 23.23 kg/(m^2).  General: This is a well-appearing elderly man in no acute distress.  HEENT: The bilateral tympanic membranes are normal, the mucous membranes are moist.  Cardiovascular: The heart has a regular rate and rhythm.  Pulmonary: The lungs are clear to auscultation bilaterally, breathing is not labored.  Extremities: Well-perfused and without edema.  Neurological: Alert and oriented to person place and time although the patient is somewhat forgetful, cranial nerves II 12 appear grossly intact, he walks with a cane.  Mental status: Appropriate mood and affect, well-groomed.    Diagnostic Test Results:  Results for orders placed or performed in visit on 06/13/18   Hemoglobin   Result Value Ref Range    Hemoglobin 10.6 (A) 13.3 - 17.7 g/dL    Narrative    LAB RESULT [facility]   Basic metabolic panel   Result Value Ref Range    Sodium 139 133 - 144 mmol/L    Potassium 4.5 3.4 - 5.3 mmol/L    Chloride 104 94 - 109 mmol/L    Carbon Dioxide 29 20 - 32 mmol/L    " Anion Gap 6 3 - 14 mmol/L    Glucose 129 (A) 70 - 99 mg/dL    Urea Nitrogen 23 7 - 30 mg/dL    Creatinine 0.91 0.66 - 1.25 mg/dL    Calcium 9.1 8.5 - 10.1 mg/dL    GFR Estimate 78 >60 mL/min/1.7m2    GFR Estimate If Black >60 >60 mL/min/1.7m2    Narrative    LAB RESULT [facility]       ASSESSMENT/PLAN:       1. Coronary artery disease of native artery of native heart with stable angina pectoris (H)  Stable symptoms since hospital discharge now status post percutaneous coronary artery stents.  On Plavix.    2. 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  Agree with stopping Byetta and starting insulin.  Home morning fasting sugars seem to be well controlled.  Recheck A1c in late August at a three-month interval.  Patient's significant other will contact me if a.m. fasting blood sugars are <90 or consistently greater than 150.  - **A1C FUTURE anytime; Future    3. Acute on chronic systolic congestive heart failure (H)  Volume status appears to be well compensated today    4. Benign essential hypertension  Blood pressure under good control today        Matt Morrissey MD  [facility]

## 2018-06-21 NOTE — PROGRESS NOTES
Clinic Care Coordination Contact    Clinic Care Coordination Contact  OUTREACH    Referral Information:  Referral Source: SNF/TCU    Primary Diagnosis: CHF    Chief Complaint   Patient presents with     Clinic Care Coordination - Post Hospital     Discharged home from [facility]         Branch Utilization: Proper Utilization at this time.   Difficulty keeping appointments:: No  Utilization    Last refreshed: 6/21/2018  2:25 PM:  No Show Count (past year) 0       Last refreshed: 6/21/2018  2:25 PM:  ED visits 0       Last refreshed: 6/21/2018  2:25 PM:  Hospital admissions 4          Current as of: 6/21/2018  2:25 PM             Clinical Concerns:  Current Medical Concerns: Patient was recently discharged from the TCU after having 2 hospitalizations for cardiac issues. His first hospitalization was for chest pain and NSTEMI. During this hospitalization a cardiac cath showed 3 vessel disease and he underwent drug eluting stents. He was discharged home with his significant other and presented back to the ED the follow day with chest pressure and SOB. Patients labs were off and an xray showed atelectasis left lung base. Patient was admitted and treated. Patient discharged to [facility] for further treatment and medical management. Patient progressed well and was discharged home with his significant other and a home care order. Spoke with Halina today after the patient asked me to call her and they did not feel home care was necessary at this time. Halina helps manage his medications and medical appointments. She was able to give me good insight into their home life. No concerns at this time.   Current Behavioral Concerns: Moderate cognitive concern. Patient will follow up with neurology outpatient. No other concerns.      Education Provided to patient: Care Coordination.    Pain  Chronic pain (GOAL):: No  Health Maintenance Reviewed: Due/Overdue     Medication Management:  Reviewed. Significant other Halina helps manage  his medications.     Functional Status:  Dependent ADLs:: Ambulation-walker  Mobility Status: Independent w/Device  Fallen 2 or more times in the past year?: Yes  Any fall with injury in the past year?: Yes    Living Situation:  Current living arrangement:: I live in a private home, I live in a private home with spouse  Type of residence:: Private home - no stairs    Diet/Exercise/Sleep:  Diet:: Diabetic diet  Inadequate nutrition (GOAL):: No  Food Insecurity: No  Tube Feeding: No  Exercise:: Yes  How intense was your typical exercise? : Light (like stretching or slow walking)  Inadequate activity/exercise (GOAL):: No  Significant changes in sleep pattern (GOAL): No    Transportation:  Transportation concerns (GOAL):: No  Transportation means:: Family, Regular car     Psychosocial:  Synagogue or spiritual beliefs that impact treatment:: No  Mental health DX:: No  Mental health management concern (GOAL):: No  Informal Support system:: Significant other     Financial/Insurance: Medicare Insurance   Financial/Insurance concerns (GOAL):: No       Resources and Interventions:  Equipment Currently Used at Home: walker, rolling    Advance Care Plan/Directive  Advanced Care Plans/Directives on file:: No  Advanced Care Plan/Directive Status: Not Applicable          Goals:   Goals        General    Being Active (pt-stated)     Notes - Note created  6/21/2018  2:30 PM by Tana Estrada RN    Goal Statement: I will walk with deana every night to help regain my strength    Measure of Success: Patient will be able to walk without a walker at the end of summer   Supportive Steps to Achieve: Education on the importance of exercise.   Barriers: Heart Failure diagnosis. Fatigue   Strengths: Significant other supportive and involved.   Date to Achieve By: September 1, 2018               Patient/Caregiver understanding: Patient verbalized understanding, engaged in AIDET communication behavior during encounter.    Outreach  Frequency: monthly  Future Appointments              In 6 days Matt Morrissey MD [facility] RASHIDA Olguin    In 3 weeks Matt Morrissey MD [facility] RASHIDA Olguin    In 2 months Rafa Samuel MD [facility]          Plan:   1. Patient will continue to walk with his significant other at home.   2. RN CC will touch base with Halina monthly to assess how things are going at home. Halina was given RN CC direct number and will call with any concerns.     Reina Ramirez RN  Clinic Care Coordinator  [phone]  [email or web address]

## 2018-06-27 ENCOUNTER — OFFICE VISIT (OUTPATIENT)
Dept: FAMILY MEDICINE | Facility: CLINIC | Age: 83
End: 2018-06-27
Payer: MEDICARE

## 2018-06-27 VITALS
BODY MASS INDEX: 23.18 KG/M2 | WEIGHT: 161.9 LBS | OXYGEN SATURATION: 90 % | HEART RATE: 60 BPM | DIASTOLIC BLOOD PRESSURE: 58 MMHG | SYSTOLIC BLOOD PRESSURE: 134 MMHG | TEMPERATURE: 96.7 F | HEIGHT: 70 IN

## 2018-06-27 DIAGNOSIS — E11.42 DM TYPE 2 WITH DIABETIC PERIPHERAL NEUROPATHY (H): Primary | ICD-10-CM

## 2018-06-27 DIAGNOSIS — I50.23 ACUTE ON CHRONIC SYSTOLIC CONGESTIVE HEART FAILURE (H): ICD-10-CM

## 2018-06-27 DIAGNOSIS — K59.00 CONSTIPATION, UNSPECIFIED CONSTIPATION TYPE: ICD-10-CM

## 2018-06-27 DIAGNOSIS — I10 BENIGN ESSENTIAL HYPERTENSION: ICD-10-CM

## 2018-06-27 DIAGNOSIS — I25.118 CORONARY ARTERY DISEASE OF NATIVE ARTERY OF NATIVE HEART WITH STABLE ANGINA PECTORIS (H): ICD-10-CM

## 2018-06-27 PROCEDURE — 99214 OFFICE O/P EST MOD 30 MIN: CPT | Performed by: INTERNAL MEDICINE

## 2018-06-27 NOTE — MR AVS SNAPSHOT
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 Morrissey (bring sugar log), Lab Work, Routine Visit.      Your next 10 appointments already scheduled     Jul 12, 2018  6:30 PM CDT   [facility]    [address] [place] [id number] [phone]           Bring a current list of meds and any records pertaining to this visit. For Physicals, please bring immunization records and any forms needing to be filled out. Please arrive 10 minutes early to complete paperwork.            Aug 31, 2018 10:45 AM CDT   Return Visit with Rafa Samuel MD   [facility] ([facility])    [address] [place] [id number] [phone] OPT 2              Future tests that were ordered for you today     Open Future Orders        Priority Expected Expires Ordered    **A1C FUTURE anytime Routine 6/27/2018 6/27/2019 6/27/2018            Who to contact     If you have questions or need follow up information about today's clinic visit or your schedule please contact [facility] directly at [phone].  Normal or non-critical lab and imaging results will be communicated to you by Raoul  "letter or phone within 4 business days after the clinic has received the results. If you do not hear from us within 7 days, please contact the clinic through [employer] or phone. If you have a critical or abnormal lab result, we will notify you by phone as soon as possible.  Submit refill requests through [employer] or call your pharmacy and they will forward the refill request to us. Please allow 3 business days for your refill to be completed.          Additional Information About Your Visit        Care EveryWhere ID     This is your Care EveryWhere ID. This could be used by other organizations to access your [facility] medical records  [id number]        Your Vitals Were     Pulse Temperature Height Pulse Oximetry BMI (Body Mass Index)       60 96.7  F (35.9  C) (Oral) 5' 10\" (1.778 m) 90% 23.23 kg/m2        Blood Pressure from Last 3 Encounters:   06/27/18 134/58   06/15/18 96/56   06/12/18 123/54    Weight from Last 3 Encounters:   06/27/18 161 lb 14.4 oz (73.4 kg)   06/15/18 155 lb 3.2 oz (70.4 kg)   06/12/18 155 lb 12.8 oz (70.7 kg)               Primary Care Provider Office Phone # Fax #    Matt Morrissey [phone] [address]  [place] [address]        Goals        General    Being Active (pt-stated)     Notes - Note created  6/21/2018  2:30 PM by Tana Estrada, RN    Goal Statement: I will walk with deana every night to help regain my strength    Measure of Success: Patient will be able to walk without a walker at the end of summer   Supportive Steps to Achieve: Education on the importance of exercise.   Barriers: Heart Failure diagnosis. Fatigue   Strengths: Significant other supportive and involved.   Date to Achieve By: September 1, 2018         Equal Access to Services     LAURENCE JACOBSON AH: Shay Brooks, watracey sweeney, qaybta kaalmamaurice gill. So [facility] [phone].    ATENCIÓN: Si cynla español, tiene a garvey " disposición servicios gratuitos de asistencia lingüística. Jose Luis saha [phone].    We comply with applicable federal civil rights laws and [place] laws. We do not discriminate on the basis of race, color, national origin, age, disability, sex, sexual orientation, or gender identity.            Thank you!     Thank you for choosing [facility]  for your care. Our goal is always to provide you with excellent care. Hearing back from our patients is one way we can continue to improve our services. Please take a few minutes to complete the written survey that you may receive in the mail after your visit with us. Thank you!             Your Updated Medication List - Protect others around you: Learn how to safely use, store and throw away your medicines at www.disposemymeds.org.          This list is accurate as of 6/27/18  2:55 PM.  Always use your most recent med list.                   Brand Name Dispense Instructions for use Diagnosis    AMITRIPTYLINE HCL PO      Take 25 mg by mouth nightly as needed for sleep        aspirin 81 MG EC tablet     30 tablet    Take 1 tablet (81 mg) by mouth daily    Transient cerebral ischemia, unspecified type       ATORVASTATIN CALCIUM PO      Take 40 mg by mouth At Bedtime        bisacodyl 10 MG Suppository    DULCOLAX     Place 10 mg rectally daily as needed for constipation        blood glucose monitoring test strip    TRUE METRIX BLOOD GLUCOSE TEST    300 strip    USE TO TEST BLOOD SUGAR THREE TIMES DAILY OR AS DIRECTED    Hypoglycemia       Cholecalciferol 09944 units Tabs      Take 1 tablet by mouth three times a week (Mon, Wed, Fri)        DULoxetine 30 MG EC capsule    CYMBALTA    90 capsule    Take 1 capsule (30 mg) by mouth daily    Polyneuropathy associated with underlying disease (H)       ferrous gluconate 324 (38 Fe) MG tablet    FERGON    100 tablet    Take 1 tablet (324 mg) by mouth daily (with breakfast)    Iron deficiency anemia, unspecified iron  deficiency anemia type       furosemide 20 MG tablet    LASIX    30 tablet    Take 1 tablet (20 mg) by mouth every other day Hold if systolic bp < 100 or patients appears dehydrated or having poor oral intake    Coronary artery disease involving native heart, angina presence unspecified, unspecified vessel or lesion type       glipiZIDE 10 MG tablet    GLUCOTROL    180 tablet    Take 1 tablet (10 mg) by mouth 2 times daily (before meals)    Type 2 diabetes mellitus with diabetic peripheral angiopathy without gangrene, without long-term current use of insulin (H)       insulin glargine 100 UNIT/ML injection    LANTUS SOLOSTAR    10 mL    Inject 14 Units Subcutaneous At Bedtime    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)       ipratropium 0.03 % spray    ATROVENT    30 mL    INHALE 1 SPRAY IN EACH NOSTRIL EVERY 12 HOURS AS NEEDED    Runny nose       isosorbide mononitrate 30 MG 24 hr tablet    IMDUR    60 tablet    Take 2 tablets (60 mg) by mouth daily    Coronary artery disease involving native heart, angina presence unspecified, unspecified vessel or lesion type       lisinopril 5 MG tablet    PRINIVIL/ZESTRIL    30 tablet    Take 1 tablet (5 mg) by mouth daily    Coronary artery disease involving native heart, angina presence unspecified, unspecified vessel or lesion type       magnesium oxide 400 (241.3 Mg) MG tablet    MAG-OX    60 tablet    TAKE 1 TABLET BY MOUTH TWICE DAILY    Constipation, unspecified constipation type       METOPROLOL TARTRATE PO      Take 25 mg by mouth 2 times daily        nitroGLYcerin 0.4 MG sublingual tablet    NITROSTAT    25 tablet    For chest pain place 1 tablet under the tongue every 5 minutes for 3 doses. If symptoms persist 5 minutes after 1st dose call 911.    Coronary artery disease involving native heart, angina presence unspecified, unspecified vessel or lesion type       OMEPRAZOLE PO      Take 40 mg by mouth 2 times daily        order for DME     1 Act    Equipment  being ordered: True Matrix Blood Glucose meter.    Type 2 diabetes mellitus with complication, with long-term current use of insulin (H)       PLAVIX PO      Take 75 mg by mouth daily    Cough       polyethylene glycol Packet    MIRALAX/GLYCOLAX     Take 17 g by mouth daily as needed for constipation        tamsulosin 0.4 MG capsule    FLOMAX    90 capsule    Take 1 capsule (0.4 mg) by mouth daily    Benign non-nodular prostatic hyperplasia with lower urinary tract symptoms

## 2018-06-27 NOTE — NURSING NOTE
"Chief Complaint   Patient presents with     Hospital F/U     /58  Pulse 60  Temp 96.7  F (35.9  C) (Oral)  Ht 5' 10\" (1.778 m)  Wt 161 lb 14.4 oz (73.4 kg)  SpO2 90%  BMI 23.23 kg/m2 Estimated body mass index is 23.23 kg/(m^2) as calculated from the following:    Height as of this encounter: 5' 10\" (1.778 m).    Weight as of this encounter: 161 lb 14.4 oz (73.4 kg).            Shanice Parra CMA  "

## 2018-06-28 NOTE — PROGRESS NOTES
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 Proximal margin of bone sent to pathology 6/4 is negative for osteomyelitis.      Antibiotic per ID.  Dressing changed in sterile fashion; betadine applied to macerated area.  Will continue to monitor. Would like to see more resolution of cellulitis prior to discharge.  NWB.  Mode per Physical Therapy.  Dr. Copeland to f/u tomorrow.    Moe Kirk DPM, FACFAS  Pager: [phone]    S:  Pt seen at bedside.  Feeling well.    O:  /63 (BP Location: Right arm)  Pulse 79  Temp 98.1  F (36.7  C) (Oral)  Resp 16  Ht 1.651 m (5' 5\")  Wt 62.3 kg (137 lb 5.6 oz)  SpO2 97%  BMI 22.86 kg/m2  Mild strike-through of serosanguinous drainage on dressing.  Minimal edema.  Incision well coapted and sutures intact.  Some maceration to area of incision with serous drainage, surrounding erythema.    Pathology 6/4:    SPECIMEN(S):   A: Left 5th metatarsal proximal margin   B: Left 5th proximal phalynx and 5th metatarsal head   C: Left 5th toe     FINAL DIAGNOSIS:   A: Bone, left fifth metatarsal, proximal, biopsy   - Nonneoplastic bone, no evidence of osteomyelitis     B: Fifth proximal phalanx, resection   - Bone with serous atrophy and acute osteomyelitis and with soft tissue   adjacent to bone showing acute inflammation     C: Left fifth toe, resection   - Ischemic change     I have reviewed this specimen and edited this report     Electronically signed out by:   Donal Coles M.D.   " I have personally performed a face to face diagnostic evaluation on this patient. I have reviewed the ACP note and agree with the history, exam and plan of care, except as noted.

## 2018-06-28 NOTE — TELEPHONE ENCOUNTER
Requested Prescriptions   Pending Prescriptions Disp Refills     MAGNESIUM-OXIDE 400 (241.3 Mg) MG tablet [Pharmacy Med Name: MAG-OXIDE 400MG TABLETS] 60 tablet 0     Sig: TAKE 1 TABLET BY MOUTH TWICE DAILY    There is no refill protocol information for this order        magnesium oxide (MAG-OX) 400 (241.3 Mg) MG tablet      Last Written Prescription Date:  4/26/18  Last Fill Quantity: 60 tablet,   # refills: 0  Last Office Visit: 6/27/18 (Yuni)  Future Office visit:    Next 5 appointments (look out 90 days)     Aug 30, 2018  6:30 PM CDT   [facility]    [address] [place] [id number] [phone]            Aug 31, 2018 10:45 AM CDT   Return Visit with Rafa Samuel MD   [facility] ([facility])    [address] [place] [id number] [phone] OPT 2                   Routing refill request to provider for review/approval because:  Drug not on the [facility] or [facility] refill protocol or controlled substance

## 2018-06-29 DIAGNOSIS — G63 POLYNEUROPATHY ASSOCIATED WITH UNDERLYING DISEASE (H): ICD-10-CM

## 2018-06-29 NOTE — TELEPHONE ENCOUNTER
Prescription approved per [facility] Refill Protocol.    Brent MINAYA RN

## 2018-07-02 RX ORDER — DULOXETIN HYDROCHLORIDE 30 MG/1
CAPSULE, DELAYED RELEASE ORAL
Qty: 90 CAPSULE | Refills: 0 | Status: SHIPPED | OUTPATIENT
Start: 2018-07-02 | End: 2018-09-28

## 2018-07-02 NOTE — TELEPHONE ENCOUNTER
"Routing refill request to provider for review/approval because:  Drug not on the [facility] refill protocol for dx of Polyneuropathy associated with underlying disease   Stacy MOORE, RN    Requested Prescriptions   Pending Prescriptions Disp Refills     DULoxetine (CYMBALTA) 30 MG EC capsule [Pharmacy Med Name: DULOXETINE DR 30MG CAPSULES] 90 capsule 0     Sig: TAKE 1 CAPSULE(30 MG) BY MOUTH DAILY    Serotonin-Norepinephrine Reuptake Inhibitors  Passed    6/29/2018  6:29 PM       Passed - Blood pressure under 140/90 in past 12 months    BP Readings from Last 3 Encounters:   06/27/18 134/58   06/15/18 96/56   06/12/18 123/54                Passed - Recent (12 mo) or future (30 days) visit within the authorizing provider's specialty    Patient had office visit in the last 12 months or has a visit in the next 30 days with authorizing provider or within the authorizing provider's specialty.  See \"Patient Info\" tab in inbasket, or \"Choose Columns\" in Meds & Orders section of the refill encounter.           Passed - Patient is age 18 or older        Next 5 appointments (look out 90 days)     Aug 30, 2018  6:30 PM CDT   [facility]    [address] [facility] [id number] [phone]            Aug 31, 2018 10:45 AM CDT   Return Visit with Rafa Samuel MD   [facility] ([facility])    [address] [id number] [phone] OPT 2                  "

## 2018-07-03 ENCOUNTER — TRANSFERRED RECORDS (OUTPATIENT)
Dept: HEALTH INFORMATION MANAGEMENT | Facility: CLINIC | Age: 83
End: 2018-07-03

## 2018-07-13 ENCOUNTER — PATIENT OUTREACH (OUTPATIENT)
Dept: CARE COORDINATION | Facility: CLINIC | Age: 83
End: 2018-07-13

## 2018-07-13 NOTE — PROGRESS NOTES
Clinic Care Coordination Contact      RN CC spoke with PCP. PCP's recommendation was to stop the glipizide during the day.     RN CC called Halina to let her know the recommendation. RN CC will touch base with Halina early to middle of next week to see how his blood sugars have been running. Halina agreed.     Reina Ramirez RN  Clinic Care Coordinator  [phone]  [email or web address]

## 2018-07-13 NOTE — PROGRESS NOTES
"Clinic Care Coordination Contact    Clinic Care Coordination Contact  OUTREACH    Chief Complaint   Patient presents with     Clinic Care Coordination - Follow-up     BS running Low        Clinical Concerns:  Current Medical Concerns:  RN MARIA LUISA received a phone call from the patient's significant other regarding his blood sugars. Per Halina the patient is on 14 units of lantus in the evening before bed. On 7/12 the patients AM blood sugar was 77 and on 7/13 the blood sugar was 71. Per the PCP's note from his last office visit she was to call if the patient's blood sugar went below 90. Halina stated the patient was asymptomatic. She is looking to see what the PCP would like to do. Per Halina his blood sugars throughout the day run typically within the same range. He has had instances of hyperglycemia but she stated \"its because he ate too much\" RN CC will touch base with PCP in regards to next steps.       Medication Management:  Spoke with the patient's significant other regarding his insulin regimen. Patient currently take 14 units of lantus at bedtime. Patient has had 2 days of blood sugars in the 70s. Patient is asymptomatic per his significant other.      Diet/Exercise/Sleep:  Per halina the patient has not had any changes in his diet. Halina is very involved and provides the meals for the patient.           Goals:   Goals        General    Being Active (pt-stated)     Notes - Note created  6/21/2018  2:30 PM by Tana Estrada, VAN    Goal Statement: I will walk with halina every night to help regain my strength    Measure of Success: Patient will be able to walk without a walker at the end of summer   Supportive Steps to Achieve: Education on the importance of exercise.   Barriers: Heart Failure diagnosis. Fatigue   Strengths: Significant other supportive and involved.   Date to Achieve By: September 1, 2018               Patient/Caregiver understanding: Patient verbalized understanding, engaged in AIDET communication " behavior during encounter.      Outreach Frequency: monthly  Future Appointments              In 1 month Matt Morrissey MD [facility],     In 1 month Rafa Samuel MD [facility]          Plan:   1. RN CC will touch base with the PCP in regards to his recommendations.   2. RN CC will update Halina. RN CC will reach out in 1 month after this phone call.     Reina Ramirez RN  Clinic Care Coordinator  [phone]  [email or web address]

## 2018-07-15 DIAGNOSIS — K29.00 OTHER ACUTE GASTRITIS WITHOUT HEMORRHAGE: ICD-10-CM

## 2018-07-16 NOTE — TELEPHONE ENCOUNTER
"OMEPRAZOLE PO     --      Sig - Route: Take 40 mg by mouth 2 times daily  - Oral     Last Written Prescription Date:  uncertain  Last Fill Quantity: ?,  # refills: ?   Last office visit: 6/27/2018 with prescribing provider:  Yuni   Future Office Visit:   Next 5 appointments (look out 90 days)     Aug 30, 2018  6:30 PM CDT   Office Visit with Matt Morrissey MD   [facility]    [address] [facility] [address] [phone]            Aug 31, 2018 10:45 AM CDT   Return Visit with Rafa Samuel MD   [facility] ([facility])    [id number]  [facility] [address] [phone] OPT 2                 Requested Prescriptions   Pending Prescriptions Disp Refills     omeprazole (PRILOSEC) 40 MG capsule [Pharmacy Med Name: OMEPRAZOLE 40MG CAPSULES] 90 capsule 0     Sig: TAKE 1 CAPSULE(40 MG) BY MOUTH DAILY 30 TO 60 MINUTES BEFORE A MEAL    PPI Protocol Failed    7/15/2018  7:38 PM       Failed - Not on Clopidogrel (unless Pantoprazole ordered)       Passed - No diagnosis of osteoporosis on record       Passed - Recent (12 mo) or future (30 days) visit within the authorizing provider's specialty    Patient had office visit in the last 12 months or has a visit in the next 30 days with authorizing provider or within the authorizing provider's specialty.  See \"Patient Info\" tab in inbasket, or \"Choose Columns\" in Meds & Orders section of the refill encounter.           Passed - Patient is age 18 or older        PHQ-9 SCORE 1/6/2017 1/27/2017   Total Score 17 4     "

## 2018-07-17 RX ORDER — OMEPRAZOLE 40 MG/1
CAPSULE, DELAYED RELEASE ORAL
Qty: 90 CAPSULE | Refills: 0 | Status: SHIPPED | OUTPATIENT
Start: 2018-07-17 | End: 2018-10-13

## 2018-07-17 NOTE — TELEPHONE ENCOUNTER
Routing refill request to provider for review/approval because:  Medication is reported/historical  Stacy MOORE RN

## 2018-07-18 NOTE — PROGRESS NOTES
Clinic Care Coordination Contact    Follow up:     RN CC spoke with the patient's significant other today regarding how the patient's blood sugars have been since they stopped the glipizide on 7/13. Halina stated that his blood sugars having been around 109-115 since stopping. Halina feels that this was a good change and had no other concerns       RN CC will follow up with halina in 1 month and route message to PCP.     Reina Ramirez RN  Clinic Care Coordinator  [phone]  [email or web address]

## 2018-07-29 DIAGNOSIS — K59.00 CONSTIPATION, UNSPECIFIED CONSTIPATION TYPE: ICD-10-CM

## 2018-07-31 NOTE — TELEPHONE ENCOUNTER
Prescription approved per [facility] Refill Protocol.  Stacy MOORE, VAN    Requested Prescriptions   Pending Prescriptions Disp Refills     MAGNESIUM-OXIDE 400 (241.3 Mg) MG tablet [Pharmacy Med Name: MAG-OXIDE 400MG TABLETS] 60 tablet 0     Sig: TAKE 1 TABLET BY MOUTH TWICE DAILY    There is no refill protocol information for this order        Next 5 appointments (look out 90 days)     Aug 30, 2018  6:30 PM CDT   Office Visit with Matt Morrissey MD   [facility] ([facility])    [address] [facility] [address] [phone]            Aug 31, 2018 10:45 AM CDT   Return Visit with Rafa Samuel MD   [facility] ([facility])    [address]  [facility] [address] [phone] OPT 2

## 2018-08-17 ENCOUNTER — TELEPHONE (OUTPATIENT)
Dept: FAMILY MEDICINE | Facility: CLINIC | Age: 83
End: 2018-08-17

## 2018-08-17 DIAGNOSIS — E11.65 TYPE 2 DIABETES MELLITUS WITH HYPERGLYCEMIA, UNSPECIFIED LONG TERM INSULIN USE STATUS: Primary | ICD-10-CM

## 2018-08-17 RX ORDER — INSULIN GLARGINE 100 [IU]/ML
14 INJECTION, SOLUTION SUBCUTANEOUS DAILY
Qty: 15 ML | Refills: 11 | Status: SHIPPED | OUTPATIENT
Start: 2018-08-17 | End: 2019-02-28

## 2018-08-17 NOTE — TELEPHONE ENCOUNTER
"\"Second\" request fax from pharmacy for    Jennifer Xiao, SIG inject 14 units under skin at bedtime    Last dispensed 6/17/18    Medication is not listed anywhere in patient's chart  Current insulin is Lantus Solostar.    Dr Morrissey - what should patient be taking?  Does this need triage?    Rajani Ruelas, RT (R)    "

## 2018-08-17 NOTE — TELEPHONE ENCOUNTER
Called pharmacy   In June patient filled Basaglar - 56 day supply 14 units at bedtime   Even though med list has Lantus   Pharmacist states insurance prefers Basaglar to Lantus     Rx is pended     Elda FERNÁNDEZ RN

## 2018-08-22 DIAGNOSIS — R09.89 RUNNY NOSE: ICD-10-CM

## 2018-08-22 NOTE — TELEPHONE ENCOUNTER
Requested Prescriptions   Pending Prescriptions Disp Refills     ipratropium (ATROVENT) 0.03 % spray [Pharmacy Med Name: IPRATROPIUM 0.03% DANIEL SP 30ML (345)] 30 mL 0     Sig: INHALE 1 SPRAY IN EACH NOSTRIL EVERY 12 HOURS AS NEEDED    There is no refill protocol information for this order              Last Written Prescription Date:  05/29/18  Last Fill Quantity: 30,   # refills: 0  Last Office Visit: 06/27/18  Future Office visit:    Next 5 appointments (look out 90 days)     Aug 30, 2018  6:30 PM CDT   Office Visit with Matt Morrissey MD   [facility] ([facility])    [address] [place] [id number] [phone]            Aug 31, 2018 10:45 AM CDT   Return Visit with Rafa Samuel MD   [facility] ([facility])    [address] [address] [phone] OPT 2                   Routing refill request to provider for review/approval because:  Drug not on the [facility], [facility] or [facility] refill protocol or controlled substance

## 2018-08-23 DIAGNOSIS — R05.9 COUGH: ICD-10-CM

## 2018-08-23 RX ORDER — IPRATROPIUM BROMIDE 21 UG/1
SPRAY, METERED NASAL
Qty: 30 ML | Refills: 1 | Status: SHIPPED | OUTPATIENT
Start: 2018-08-23 | End: 2019-02-07

## 2018-08-23 NOTE — TELEPHONE ENCOUNTER
Prescription approved per [facility] Refill Protocol.  Beulah Blake RN

## 2018-08-24 NOTE — TELEPHONE ENCOUNTER
"Clopidogrel 75 mg  Medication is listed as historical on patient's med list    Last Written Prescription Date:  ?  Last Fill Quantity: ?,  # refills: ?   Last office visit: 6/27/2018 with prescribing provider:  Yuni   Future Office Visit:   Next 5 appointments (look out 90 days)     Aug 30, 2018  6:30 PM CDT   [facility]   [facility]    [address] [facility] [id number] [phone]            Aug 31, 2018 10:45 AM CDT   Return Visit with Rafa Samuel MD   [facility] ([facility])    [id number]  [facility] [id number] [phone] OPT 2                 Requested Prescriptions   Pending Prescriptions Disp Refills     clopidogrel (PLAVIX) 75 MG tablet [Pharmacy Med Name: CLOPIDOGREL 75MG TABLETS] 30 tablet 0     Sig: TAKE 1 TABLET BY MOUTH DAILY    Plavix Failed    8/23/2018  7:57 PM       Failed - No active PPI on record unless is Protonix       Failed - Normal HGB on file in past 12 months    Recent Labs   Lab Test 06/13/18   HGB  10.6*              Passed - Normal Platelets on file in past 12 months    Recent Labs   Lab Test 06/04/18   PLT  180              Passed - Recent (12 mo) or future (30 days) visit within the authorizing provider's specialty    Patient had office visit in the last 12 months or has a visit in the next 30 days with authorizing provider or within the authorizing provider's specialty.  See \"Patient Info\" tab in inbasket, or \"Choose Columns\" in Meds & Orders section of the refill encounter.           Passed - Patient is age 18 or older          "

## 2018-08-27 RX ORDER — CLOPIDOGREL BISULFATE 75 MG/1
TABLET ORAL
Qty: 90 TABLET | Refills: 2 | Status: SHIPPED | OUTPATIENT
Start: 2018-08-27 | End: 2019-05-28

## 2018-08-27 NOTE — TELEPHONE ENCOUNTER
Routing refill request to provider for review/approval because:  Drug not active on patient's medication list - patient reported on med list  Not previously sent from patient's chart  Please review associated diagnosis (currently listed as cough)     Elda FERNÁNDEZ RN

## 2018-08-30 ENCOUNTER — OFFICE VISIT (OUTPATIENT)
Dept: FAMILY MEDICINE | Facility: CLINIC | Age: 83
End: 2018-08-30
Payer: MEDICARE

## 2018-08-30 VITALS
WEIGHT: 163.9 LBS | OXYGEN SATURATION: 96 % | TEMPERATURE: 97.1 F | HEART RATE: 69 BPM | BODY MASS INDEX: 23.46 KG/M2 | HEIGHT: 70 IN | DIASTOLIC BLOOD PRESSURE: 65 MMHG | SYSTOLIC BLOOD PRESSURE: 121 MMHG

## 2018-08-30 DIAGNOSIS — E11.42 DM TYPE 2 WITH DIABETIC PERIPHERAL NEUROPATHY (H): Primary | ICD-10-CM

## 2018-08-30 LAB — HBA1C MFR BLD: 8.7 % (ref 0–5.6)

## 2018-08-30 PROCEDURE — 36416 COLLJ CAPILLARY BLOOD SPEC: CPT | Performed by: INTERNAL MEDICINE

## 2018-08-30 PROCEDURE — 83036 HEMOGLOBIN GLYCOSYLATED A1C: CPT | Performed by: INTERNAL MEDICINE

## 2018-08-30 PROCEDURE — 99213 OFFICE O/P EST LOW 20 MIN: CPT | Performed by: INTERNAL MEDICINE

## 2018-08-30 NOTE — MR AVS SNAPSHOT
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    To get better control of the blood sugar, we need to cautiously increase the dose of Basaglar insulin that you take each night.    Our goal is to have Dustin's morning (FASTING) blood sugars consistently less than 150.    Increase Basaglar from 14 units nightly to 16 units nightly.  If after 3 days, Dustin's blood sugars remain greater than 150, then increase Basaglar to 18 units nightly.  You may increase the Basaglar dose by 2 units every 3 days until the morning fasting blood sugar is consistently less than 150.  If you go past 24 units per night, then inform me.      If the blood sugar is measured less than 90, then do not increase Basaglar further and return to the previous dose.        Keep working on minimizing starches and sugars in your diet.  Watch the pastries, cookies, cakes and candies.             Follow-ups after your visit        Follow-up notes from your care team     Return in about 3 months (around 11/30/2018) for Diabetes Check with A1c level.      Your next 10 appointments already scheduled     Aug 31, 2018 10:45 AM CDT   Return Visit with Rafa Samuel MD   [facility] ([facility])    [address] [place] [id number] [phone] OPT 2              Who to contact     If you have questions or need follow up information about today's clinic visit or your schedule please contact [facility] directly at [phone].  Normal or non-critical lab and imaging results will be  "communicated to you by MyChart, letter or phone within 4 business days after the clinic has received the results. If you do not hear from us within 7 days, please contact the clinic through MyChart or phone. If you have a critical or abnormal lab result, we will notify you by phone as soon as possible.  Submit refill requests through [facility] or call your pharmacy and they will forward the refill request to us. Please allow 3 business days for your refill to be completed.          Additional Information About Your Visit        Care EveryWhere ID     This is your Care EveryWhere ID. This could be used by other organizations to access your [facility] medical records  [id number]        Your Vitals Were     Pulse Temperature Height Pulse Oximetry BMI (Body Mass Index)       69 97.1  F (36.2  C) (Tympanic) 5' 10\" (1.778 m) 96% 23.52 kg/m2        Blood Pressure from Last 3 Encounters:   08/30/18 121/65   06/27/18 134/58   06/15/18 96/56    Weight from Last 3 Encounters:   08/30/18 163 lb 14.4 oz (74.3 kg)   06/27/18 161 lb 14.4 oz (73.4 kg)   06/15/18 155 lb 3.2 oz (70.4 kg)              We Performed the Following     DEPRESSION ACTION PLAN (DAP)     Hemoglobin A1c        Primary Care Provider Office Phone # Fax #    Matt Morrissey [phone] [address]  [place] [address]        Goals        General    Being Active (pt-stated)     Notes - Note created  6/21/2018  2:30 PM by Tana Estrada, RN    Goal Statement: I will walk with deana every night to help regain my strength    Measure of Success: Patient will be able to walk without a walker at the end of summer   Supportive Steps to Achieve: Education on the importance of exercise.   Barriers: Heart Failure diagnosis. Fatigue   Strengths: Significant other supportive and involved.   Date to Achieve By: September 1, 2018         Equal Access to Services     LAURENCE JACOBSON AH: Shay Brooks, laura sweeney, ashley sosa " maurice kirbyhelena svetahallie flor'aan ah. So [facility] [phone].    ATENCIÓN: Si cynla aimee, tiene a garvey disposición servicios gratuitos de asistencia lingüística. Jose Luis al [phone].    We comply with applicable federal civil rights laws and [place] laws. We do not discriminate on the basis of race, color, national origin, age, disability, sex, sexual orientation, or gender identity.            Thank you!     Thank you for choosing [facility]  for your care. Our goal is always to provide you with excellent care. Hearing back from our patients is one way we can continue to improve our services. Please take a few minutes to complete the written survey that you may receive in the mail after your visit with us. Thank you!             Your Updated Medication List - Protect others around you: Learn how to safely use, store and throw away your medicines at www.disposemymeds.org.          This list is accurate as of 8/30/18  7:15 PM.  Always use your most recent med list.                   Brand Name Dispense Instructions for use Diagnosis    AMITRIPTYLINE HCL PO      Take 25 mg by mouth nightly as needed for sleep        aspirin 81 MG EC tablet     30 tablet    Take 1 tablet (81 mg) by mouth daily    Transient cerebral ischemia, unspecified type       ATORVASTATIN CALCIUM PO      Take 40 mg by mouth At Bedtime        BASAGLAR 100 UNIT/ML injection     15 mL    Inject 14 Units Subcutaneous daily    Type 2 diabetes mellitus with hyperglycemia, unspecified long term insulin use status (H)       bisacodyl 10 MG Suppository    DULCOLAX     Place 10 mg rectally daily as needed for constipation        blood glucose monitoring test strip    TRUE METRIX BLOOD GLUCOSE TEST    300 strip    USE TO TEST BLOOD SUGAR THREE TIMES DAILY OR AS DIRECTED    Hypoglycemia       Cholecalciferol 76875 units Tabs      Take 1 tablet by mouth three times a week (Mon, Wed, Fri)        clopidogrel 75 MG tablet    PLAVIX     90 tablet    TAKE 1 TABLET BY MOUTH DAILY    Cough       DULoxetine 30 MG EC capsule    CYMBALTA    90 capsule    TAKE 1 CAPSULE(30 MG) BY MOUTH DAILY    Polyneuropathy associated with underlying disease (H)       ferrous gluconate 324 (38 Fe) MG tablet    FERGON    100 tablet    Take 1 tablet (324 mg) by mouth daily (with breakfast)    Iron deficiency anemia, unspecified iron deficiency anemia type       furosemide 20 MG tablet    LASIX    30 tablet    Take 1 tablet (20 mg) by mouth every other day Hold if systolic bp < 100 or patients appears dehydrated or having poor oral intake    Coronary artery disease involving native heart, angina presence unspecified, unspecified vessel or lesion type       ipratropium 0.03 % spray    ATROVENT    30 mL    INHALE 1 SPRAY IN EACH NOSTRIL EVERY 12 HOURS AS NEEDED    Runny nose       isosorbide mononitrate 30 MG 24 hr tablet    IMDUR    60 tablet    Take 2 tablets (60 mg) by mouth daily    Coronary artery disease involving native heart, angina presence unspecified, unspecified vessel or lesion type       lisinopril 5 MG tablet    PRINIVIL/ZESTRIL    30 tablet    Take 1 tablet (5 mg) by mouth daily    Coronary artery disease involving native heart, angina presence unspecified, unspecified vessel or lesion type       MAGNESIUM-OXIDE 400 (241.3 Mg) MG tablet   Generic drug:  magnesium oxide     180 tablet    TAKE 1 TABLET BY MOUTH TWICE DAILY    Constipation, unspecified constipation type       METOPROLOL TARTRATE PO      Take 25 mg by mouth 2 times daily        nitroGLYcerin 0.4 MG sublingual tablet    NITROSTAT    25 tablet    For chest pain place 1 tablet under the tongue every 5 minutes for 3 doses. If symptoms persist 5 minutes after 1st dose call 911.    Coronary artery disease involving native heart, angina presence unspecified, unspecified vessel or lesion type       * OMEPRAZOLE PO      Take 40 mg by mouth 2 times daily        * omeprazole 40 MG capsule    priLOSEC     90 capsule    TAKE 1 CAPSULE(40 MG) BY MOUTH DAILY 30 TO 60 MINUTES BEFORE A MEAL    Other acute gastritis without hemorrhage       order for DME     1 Act    Equipment being ordered: True Matrix Blood Glucose meter.    Type 2 diabetes mellitus with complication, with long-term current use of insulin (H)       polyethylene glycol Packet    MIRALAX/GLYCOLAX     Take 17 g by mouth daily as needed for constipation        tamsulosin 0.4 MG capsule    FLOMAX    90 capsule    Take 1 capsule (0.4 mg) by mouth daily    Benign non-nodular prostatic hyperplasia with lower urinary tract symptoms       * Notice:  This list has 2 medication(s) that are the same as other medications prescribed for you. Read the directions carefully, and ask your doctor or other care provider to review them with you.

## 2018-08-30 NOTE — PROGRESS NOTES
SUBJECTIVE:   Dustin Pearce is a 89 year old male who presents to clinic today for the following health issues:      Diabetes Follow-up    Patient is checking blood sugars: twice daily.    Blood sugar testing frequency justification: regular monitoring  Results are as follows:         155    Diabetic concerns: None and blood sugar frequently over 200     Symptoms of hypoglycemia (low blood sugar): none     Paresthesias (numbness or burning in feet) or sores: No     Date of last diabetic eye exam: with in a year    BP Readings from Last 2 Encounters:   08/30/18 121/65   06/27/18 134/58     Hemoglobin A1C (%)   Date Value   05/27/2018 9.1 (H)   04/11/2018 7.6 (H)     LDL Cholesterol Calculated (mg/dL)   Date Value   12/31/2017 61   12/01/2017 39       Diabetes Management Resources  Hypertension Follow-up      Outpatient blood pressures are not being checked.    Low Salt Diet: not monitoring salt      Amount of exercise or physical activity: 6-7 days/week for an average of 15-30 minutes    Problems taking medications regularly: No    Medication side effects: none    Diet: regular (no restrictions)        Here to follow-up diabetes control.  He was switched from oral agents and Byetta to glargine insulin.  He has tolerated the switch without incident.  He has not had any low blood sugar symptoms or readings.  His wife helps him with the insulin injections.    Problem list and histories reviewed & adjusted, as indicated.  Additional history: as documented    Patient Active Problem List   Diagnosis     Coronary artery disease of native artery of native heart with stable angina pectoris (H)     Hyperlipidemia, unspecified hyperlipidemia type     Benign non-nodular prostatic hyperplasia with lower urinary tract symptoms     Gastroesophageal reflux disease without esophagitis     Cerebrovascular accident (H)     Carotid artery stenosis     Abdominal aortic aneurysm (H)     Lumbar back pain     Benign essential  hypertension     TIA (transient ischemic attack)     Paroxysmal atrial fibrillation (H)     Ischemic cardiomyopathy     Aortic root dilatation (H)     Physical deconditioning     Diabetic ulcer of left foot associated with diabetes mellitus due to underlying condition (H)     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)     Anemia due to blood loss, acute     Diarrhea, unspecified type     Nausea and vomiting, intractability of vomiting not specified, unspecified vomiting type     Acute pain of left shoulder     Balance problems     Generalized muscle weakness     Peripheral artery disease (H)     Aortic stenosis     RBBB with left anterior fascicular block     Stroke of unknown etiology (H)     Carotid stenosis     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)     History of TIA (transient ischemic attack) and stroke     UTI (urinary tract infection)     UTI (urinary tract infection) due to Enterococcus     Generalized weakness     Type 2 diabetes mellitus with hyperglycemia, unspecified long term insulin use status (H)     Atherosclerosis of coronary artery of native heart without angina pectoris, unspecified vessel or lesion type     Depression, unspecified depression type     Slow transit constipation     Severe sepsis (H)     Chest discomfort     NSTEMI (non-ST elevated myocardial infarction) (H)     Anemia     Cognitive impairment     Acute on chronic systolic congestive heart failure (H)     Benign prostatic hyperplasia without lower urinary tract symptoms     Past Surgical History:   Procedure Laterality Date     AMPUTATE FOOT Left 6/4/2017    Procedure: AMPUTATE FOOT;  Sun Add#3: partial left foot amputation - Sagital Saw - Mini C-Arm;  Surgeon: Moe Kirk DPM;  Location:  OR     CHOLECYSTECTOMY       ENDARTERECTOMY CAROTID Right 1/3/2018    Procedure: ENDARTERECTOMY CAROTID;  RIGHT CAROTID ENDARTERECTOMY WITH EEG;  Surgeon: Roland Rodriguez MD;  Location:   OR     ESOPHAGOSCOPY, GASTROSCOPY, DUODENOSCOPY (EGD), COMBINED N/A 12/26/2016    Procedure: COMBINED ESOPHAGOSCOPY, GASTROSCOPY, DUODENOSCOPY (EGD);  Surgeon: Nasim Louis MD;  Location:  GI     GENITOURINARY SURGERY      KIDNEY STONE REMOVAL X 4     HC UGI ENDOSCOPY W EUS N/A 12/29/2016    Procedure: COMBINED ENDOSCOPIC ULTRASOUND, ESOPHAGOSCOPY, GASTROSCOPY, DUODENOSCOPY (EGD);  Surgeon: Nasim Louis MD;  Location:  GI     HERNIA REPAIR       INCISION AND DRAINAGE FOOT, COMBINED Left 6/6/2017    Procedure: COMBINED INCISION AND DRAINAGE FOOT;  REVISIONAL LEFT FOOT INCISION AND DRAINAGE WITH POSSIBLE FLAP CLOSURE , ANTIBIOTIC SOLUTION ;  Surgeon: Nabil Ann DPM;  Location:  OR     LASER HOLMIUM LITHOTRIPSY URETER(S), INSERT STENT, COMBINED  3/2/2012    Procedure:COMBINED CYSTOSCOPY, URETEROSCOPY, LASER HOLMIUM LITHOTRIPSY URETER(S), INSERT STENT; CYSTOSCOPY, RIGHT RETROGRADES, RIGHT URETEROSCOPY, HOLMIUM LASER, RIGHT STENT PLACEMENT; Surgeon:ANJELICA LEÓN; Location: OR     ROTATOR CUFF REPAIR RT/LT         Social History   Substance Use Topics     Smoking status: Former Smoker     Packs/day: 1.00     Years: 30.00     Types: Cigarettes     Quit date: 1/1/1999     Smokeless tobacco: Never Used     Alcohol use 4.2 oz/week     7 Standard drinks or equivalent per week      Comment: 1 drink per biweekly     Family History   Problem Relation Age of Onset     Breast Cancer Mother      Heart Failure Father 81         Current Outpatient Prescriptions   Medication Sig Dispense Refill     AMITRIPTYLINE HCL PO Take 25 mg by mouth nightly as needed for sleep       aspirin EC 81 MG EC tablet Take 1 tablet (81 mg) by mouth daily 30 tablet 0     ATORVASTATIN CALCIUM PO Take 40 mg by mouth At Bedtime        BASAGLAR 100 UNIT/ML injection Inject 14 Units Subcutaneous daily 15 mL 11     bisacodyl (DULCOLAX) 10 MG Suppository Place 10 mg rectally daily as needed for constipation        "blood glucose monitoring (TRUE METRIX BLOOD GLUCOSE TEST) test strip USE TO TEST BLOOD SUGAR THREE TIMES DAILY OR AS DIRECTED 300 strip 1     Cholecalciferol 23081 UNITS TABS Take 1 tablet by mouth three times a week (Mon, Wed, Fri)       clopidogrel (PLAVIX) 75 MG tablet TAKE 1 TABLET BY MOUTH DAILY 90 tablet 2     DULoxetine (CYMBALTA) 30 MG EC capsule TAKE 1 CAPSULE(30 MG) BY MOUTH DAILY 90 capsule 0     ferrous gluconate (FERGON) 324 (38 Fe) MG tablet Take 1 tablet (324 mg) by mouth daily (with breakfast) 100 tablet 0     furosemide (LASIX) 20 MG tablet Take 1 tablet (20 mg) by mouth every other day Hold if systolic bp < 100 or patients appears dehydrated or having poor oral intake 30 tablet 0     ipratropium (ATROVENT) 0.03 % spray INHALE 1 SPRAY IN EACH NOSTRIL EVERY 12 HOURS AS NEEDED 30 mL 1     isosorbide mononitrate (IMDUR) 30 MG 24 hr tablet Take 2 tablets (60 mg) by mouth daily 60 tablet 0     lisinopril (PRINIVIL/ZESTRIL) 5 MG tablet Take 1 tablet (5 mg) by mouth daily 30 tablet 1     MAGNESIUM-OXIDE 400 (241.3 Mg) MG tablet TAKE 1 TABLET BY MOUTH TWICE DAILY 180 tablet 1     METOPROLOL TARTRATE PO Take 25 mg by mouth 2 times daily       nitroGLYcerin (NITROSTAT) 0.4 MG sublingual tablet For chest pain place 1 tablet under the tongue every 5 minutes for 3 doses. If symptoms persist 5 minutes after 1st dose call 911. 25 tablet 0     omeprazole (PRILOSEC) 40 MG capsule TAKE 1 CAPSULE(40 MG) BY MOUTH DAILY 30 TO 60 MINUTES BEFORE A MEAL 90 capsule 0     OMEPRAZOLE PO Take 40 mg by mouth 2 times daily        order for DME Equipment being ordered: True Matrix Blood Glucose meter. 1 Act 11     polyethylene glycol (MIRALAX/GLYCOLAX) Packet Take 17 g by mouth daily as needed for constipation       tamsulosin (FLOMAX) 0.4 MG capsule Take 1 capsule (0.4 mg) by mouth daily 90 capsule 3     Allergies   Allergen Reactions     Oxycodone Other (See Comments)     \"TERRIBLE SWEATING\"     Sulfa Drugs      Tetracycline  " "      Reviewed and updated as needed this visit by clinical staff       Reviewed and updated as needed this visit by Provider         ROS:      OBJECTIVE:     /65 (BP Location: Left arm, Patient Position: Chair, Cuff Size: Adult Regular)  Pulse 69  Temp 97.1  F (36.2  C) (Tympanic)  Ht 5' 10\" (1.778 m)  Wt 163 lb 14.4 oz (74.3 kg)  SpO2 96%  BMI 23.52 kg/m2  Body mass index is 23.52 kg/(m^2).  Well-appearing no acute distress    Diagnostic Test Results:  Results for orders placed or performed in visit on 08/30/18   Hemoglobin A1c   Result Value Ref Range    Hemoglobin A1C 8.7 (H) 0 - 5.6 %       ASSESSMENT/PLAN:         ICD-10-CM    1. 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    To get better control of the blood sugar, we need to cautiously increase the dose of Basaglar insulin that you take each night.    Our goal is to have Dustin's morning (FASTING) blood sugars consistently less than 150.    Increase Basaglar from 14 units nightly to 16 units nightly.  If after 3 days, Dustin's blood sugars remain greater than 150, then increase Basaglar to 18 units nightly.  You may increase the Basaglar dose by 2 units every 3 days until the morning fasting blood sugar is consistently less than 150.  If you go past 24 units per night, then inform me.      If the blood sugar is measured less than 90, then do not increase Basaglar further and return to the previous dose.        Keep working on minimizing starches and sugars in your diet.  Watch the pastries, cookies, cakes and candies.         Total face to face contact time was greater than 20 minutes of which more than 50% of this time was spent counseling and coordinating care regarding the above topics.    Matt Morrissey MD  [facility]    "

## 2018-08-30 NOTE — NURSING NOTE
"Chief Complaint   Patient presents with     Diabetes        Initial /65 (BP Location: Left arm, Patient Position: Chair, Cuff Size: Adult Regular)  Pulse 69  Temp 97.1  F (36.2  C) (Tympanic)  Ht 5' 10\" (1.778 m)  Wt 163 lb 14.4 oz (74.3 kg)  SpO2 96%  BMI 23.52 kg/m2 Estimated body mass index is 23.52 kg/(m^2) as calculated from the following:    Height as of this encounter: 5' 10\" (1.778 m).    Weight as of this encounter: 163 lb 14.4 oz (74.3 kg)..    BP completed using cuff size: regular  MEDICATIONS REVIEWED  SOCIAL AND FAMILY HX REVIEWED  Usha Wilde CMA  "

## 2018-08-31 NOTE — PATIENT INSTRUCTIONS
Your blood sugar is better, but it is not at your goal level yet.    To get better control of the blood sugar, we need to cautiously increase the dose of Basaglar insulin that you take each night.    Our goal is to have Dustin's morning (FASTING) blood sugars consistently less than 150.    Increase Basaglar from 14 units nightly to 16 units nightly.  If after 3 days, Dustin's blood sugars remain greater than 150, then increase Basaglar to 18 units nightly.  You may increase the Basaglar dose by 2 units every 3 days until the morning fasting blood sugar is consistently less than 150.  If you go past 24 units per night, then inform me.      If the blood sugar is measured less than 90, then do not increase Basaglar further and return to the previous dose.        Keep working on minimizing starches and sugars in your diet.  Watch the pastries, cookies, cakes and candies.

## 2018-09-05 NOTE — PROGRESS NOTES
[facility]  Stroke Workup Summary    Dustin Pearce MRN# [id number]   YOB: 1928 Age: 89 year old     Date of Admission:  12/31/2017  Date of Discharge:  Pending, pr vascular surgery              Stroke Summary:   Type of stroke:  --TIA  Risk factors:  --carotid stenosis/ulceration, HTN, DM II  Recommendations:  --Activity: as tolerated  --Diet: per recommendations of speech  --does not need dual antiplatelet from neurological standpoint, could go to aspirin only  --continue statin  Follow up:  --Referral to outpatient neurology in 4-6 weeks for TIA follow up             Interval History:   Patient presented for left sided weakness and neglect. CT with chronic left frontal stroke, no acute abnormality, CTA with chronic left ICA occlusion and moderate right ICA stenosis. MRI without acute abnormality. Patient essentially returned to his baseline.  Still without recurrent symptoms. Underwent CEA 1/3/18.         Discharge Disposition:   To TCU when available           Medications Prior to Admission:     Prescriptions Prior to Admission   Medication Sig Dispense Refill Last Dose     HYDROcodone-acetaminophen (NORCO) 5-325 MG per tablet Take 1 tablet by mouth every 4 hours as needed for moderate to severe pain        METOPROLOL SUCCINATE ER PO Take 12.5 mg by mouth daily   12/30/2017 at Unknown time     LISINOPRIL PO Take 2.5 mg by mouth daily    12/30/2017 at Unknown time     metoprolol (TOPROL-XL) 25 MG 24 hr tablet Take 0.5 tablets (12.5 mg) by mouth daily   12/30/2017 at Unknown time     glipiZIDE (GLUCOTROL) 10 MG tablet Take 1 tablet (10 mg) by mouth 2 times daily (before meals) 180 tablet 3 12/30/2017 at Unknown time     AMITRIPTYLINE HCL PO Take 25 mg by mouth nightly as needed for sleep   12/30/2017 at Unknown time     ipratropium (ATROVENT) 0.03 % spray Spray 2 sprays into both nostrils every 12 hours   12/30/2017 at Unknown time     DULoxetine (CYMBALTA) 30 MG EC capsule Take 1  capsule (30 mg) by mouth daily 90 capsule 3 12/30/2017 at Unknown time     omeprazole (PRILOSEC) 40 MG capsule Take 1 capsule (40 mg) by mouth daily Take 30-60 minutes before a meal. 90 capsule 3 12/30/2017 at Unknown time     tamsulosin (FLOMAX) 0.4 MG capsule Take 1 capsule (0.4 mg) by mouth daily 90 capsule 3 12/30/2017 at Unknown time     aspirin EC 81 MG EC tablet Take 1 tablet (81 mg) by mouth daily 30 tablet 0 12/30/2017 at Unknown time     METFORMIN HCL PO Take 1,000 mg by mouth 2 times daily (with meals)   12/30/2017 at Unknown time     ATORVASTATIN CALCIUM PO Take 80 mg by mouth daily   12/30/2017 at Unknown time     blood glucose monitoring (NO BRAND SPECIFIED) test strip Use to test blood sugar three times daily or as directed. 300 each 3 Taking     order for DME Equipment being ordered: True Matrix Blood Glucose meter. 1 Device 0 Taking     Psyllium (METAMUCIL PO) Take 1 packet by mouth daily as needed    12/30/2017 at Unknown time     Clopidogrel Bisulfate, [id number], (PLAVIX PO) Take 75 mg by mouth daily    12/30/2017 at Unknown time             Discharge Medications:     Current Facility-Administered Medications   Medication     heparin lock flush 10 UNIT/ML injection 5-10 mL     heparin lock flush 10 UNIT/ML injection 5-10 mL     omeprazole (priLOSEC) CR capsule 40 mg     insulin glargine (LANTUS) injection 12 Units     exenatide (BYETTA) 5 mcg/0.02mL per dose (60 doses per 1.2 mL prefilled pen)     lidocaine (LMX4) cream     sodium chloride (PF) 0.9% PF flush 3 mL     sodium chloride (PF) 0.9% PF flush 3 mL     benzocaine-menthol (CHLORASEPTIC) 6-10 MG lozenge 1-2 lozenge     0.9% sodium chloride infusion     acetaminophen (TYLENOL) tablet 650 mg     traMADol (ULTRAM) tablet 50 mg     sodium chloride (PF) 0.9% PF flush 10 mL     ipratropium (ATROVENT) 0.03 % spray 2 spray     lidocaine (LMX4) cream     Medication Instruction     naloxone (NARCAN) injection 0.1-0.4 mg     ondansetron (ZOFRAN-ODT)  ODT tab 4 mg    Or     ondansetron (ZOFRAN) injection 4 mg     Medication Instruction     aspirin EC EC tablet 325 mg    Or     aspirin Suppository 300 mg     glucose 40 % gel 15-30 g    Or     dextrose 50 % injection 25-50 mL    Or     glucagon injection 1 mg     insulin aspart (NovoLOG) inj (RAPID ACTING)     insulin aspart (NovoLOG) inj (RAPID ACTING)     atorvastatin (LIPITOR) tablet 80 mg     DULoxetine (CYMBALTA) EC capsule 30 mg     metoprolol (TOPROL-XL) half-tab 12.5 mg     tamsulosin (FLOMAX) capsule 0.4 mg                Hospital Problems:   #. (G45.1) TIA involving right internal carotid artery  (primary encounter diagnosis)  --complete resolution of symptoms  --not tPA candidate due to being outside of the treatment window on admission  --not IR candidate due to lack of large vessel occlusion  ----MRI negative for acute abnormality  --RISK FACTORS: carotid stenosis, HTN, DM II  --on aspirin 81 mg and plavix 75 mg PO daily  ------no need to continue dual antiplatelet therapy per neurological standpoint, could go to aspirin only  --------defer to cardiology if there is reason to continue  --lipids normal on atorvastatin  #. (I65.21) Carotid stenosis, symptomatic w/o infarct, right  --carotid US with 50-69% stenosis   -----concern for ulceration  ------underwent CEA 1/3/18  #. (I10) Essential hypertension  --management per primary service  #. (E11.69) Type 2 diabetes mellitus with other specified complication, without long-term current use of insulin (H)  --A1c 8.6  --management per primary service  #. PT/OT/Speech  --continue evaluations  --will DC to TCU  #. Nutrition  --Per speech therapy evaluation   #. DVT Prophylaxis  --Mechanical        Discharge neuro exam:   Neuro:       Mental Status Exam:   Awake, alert, oriented X3. Speech and language are intact. Mental status is normal       Cranial Nerves:  Pupils 3 mm, reactive. EOMI. Face sensation is normal. Face is symmetric. Tongue and uvula are  midline. Other CN are normal           Motor:  5/5 X 4. Tone and bulk are normal           Reflexes:  Normal DTR. Toes downgoing.        Sensory:  Normal to light touch               Coordination:   Intact finger-to-nose        Gait:  No significant difficulties         Data:   Labs:  CBC RESULTS:  Recent Labs   Lab Test  01/04/18   0630  01/01/18   0815  12/31/17   1130   WBC  6.9  5.3  9.6   RBC  3.31*  3.93*  4.43   HGB  10.1*  12.1*  13.7   HCT  31.3*  36.5*  41.8   PLT  114*  126*  160     Basic Metabolic Panel:  Recent Labs   Lab Test  01/04/18   0950  01/01/18   0815  12/31/17   1425  12/31/17   1130  12/01/17   0928   NA   --   137   --   137  139   POTASSIUM  4.0  4.3  4.4  5.4*  5.9*   CHLORIDE   --   105   --   102  104   CO2   --   26   --   28  30   BUN   --   13   --   19  17   CR  0.70  0.66   --   0.73  0.83   GLC   --   156*  193*  222*  256*   ALLISON   --   8.2*   --   9.5  10.6*     Coagulation  Recent Labs   Lab Test  12/31/17   1130  01/20/17   1030   INR  0.99  1.03   PTT  32  29      Lipid Profile:  Recent Labs   Lab Test  12/31/17   1425  12/01/17   0928  12/18/15 12/05/14   CHOL  133  142   < >  198  160   HDL  43  66   < >  39*  48   LDL  61  39   < >  123  82   TRIG  147  186*   < >  181*  149   CHOLHDLRATIO   --    --    --   5.1*  3.3    < > = values in this interval not displayed.     Thyroid Panel:  Recent Labs   Lab Test  12/31/17   1425  01/20/17   1030  10/20/16   1825   TSH  2.16  3.12  4.63*   T4   --    --   0.94      Vitamin B12:   Recent Labs   Lab Test  01/20/17   1030   B12  298      Vitamin D level:   Recent Labs   Lab Test  12/31/17   1425  01/20/17   1030   VITDT  13*  19*     A1C:   Recent Labs   Lab Test  12/31/17   1425  12/14/17   0821  12/01/17   0928  07/24/17   1608  04/21/17   1543   A1C  8.6*  8.9*  8.7*  6.7*  8.3*     Troponin I:   Recent Labs   Lab Test  12/31/17   2148  12/31/17   1425  01/20/17   2235  01/20/17   1030  12/25/16   0002   TROPI  0.033  0.024   0.030  0.018  0.022     CRP inflammation:   Recent Labs   Lab Test  12/31/17   1425  06/09/17   0535  06/08/17   0613  06/07/17   0640  05/31/17   1800   CRP  <2.9  89.0*  93.4*  107.0*  21.2*     ESR:   Recent Labs   Lab Test  05/31/17   1800   SED  43*         Cardiac:  TTE:  The left ventricle is normal in size. There is mild to moderate concentric left ventricular hypertrophy. Left ventricular systolic function is moderately reduced. The visual ejection fraction is estimated at 35-40%. Grade I or early diastolic dysfunction. There is severe hypokinesis to akinesis of the entire inferior and mid to basal inferolateral walls. There is no thrombus seen in the left ventricle. The right ventricle is normal size. The right ventricular systolic function is normal. Trace to mild mitral and tricuspid regurgitation. There is moderate trileaflet aortic sclerosis. No aortic regurgitation is present. Transaortic gradients are mildly increased consistent with aortic sclerosis. The peak AoV pressure gradient is 15.2 mmHg. The mean AoV pressure gradient is 7.9 mmHg. Mild aortic root dilatation. No pericardial effusion. In comparison to the previous report dated 01/21/2017, the findings are similar.      Imaging:  CT head 12/31/17:   --Moderate-sized chronic left frontal infarct again noted without change. Diffuse cerebral volume loss and cerebral white matter changes consistent with chronic small vessel ischemic disease. No evidence for acute intracranial pathology.      CTA head/neck 12/31/17:   1. Chronic occlusion of the left internal carotid artery from the origin to the terminus again noted without change.  2. Moderate atherosclerotic narrowing of the distal P2 segment of the left posterior cerebral artery again noted without change.  3. Atherosclerotic plaque at the origin of the right internal carotid artery resulting in less than 50% luminal diameter stenosis again noted.  4. No evidence for intracranial cerebral artery  occlusion to explain the patient's recent symptoms.  5. Overall, no change from the comparison study.     CTP head 12/31/17:  --There is a large chronic infarct at the anterolateral aspect of the left frontal lobe. There are no other perfusion defects.     MRI brain 1/1/18:   --Diffuse cerebral volume loss and cerebral white matter changes consistent with chronic small vessel ischemic disease. Moderate sized chronic left frontal infarct again noted without change. No evidence for acute intracranial pathology. No change from the comparison study.     Carotid US 1/2/18:  --50-69% diameter stenosis of the right ICA relative to the distal ICA diameter.              Pending Results:   None            Text Page    Anna Velasquez, MSN, FNP-BC, RN CNRN     detailed exam

## 2018-09-06 ENCOUNTER — PATIENT OUTREACH (OUTPATIENT)
Dept: CARE COORDINATION | Facility: CLINIC | Age: 83
End: 2018-09-06

## 2018-09-06 NOTE — PROGRESS NOTES
Clinic Care Coordination Contact    Clinic Care Coordination Contact  OUTREACH    Chief Complaint   Patient presents with     Clinic Care Coordination - Follow-up     RN         Clinical Concerns:  Current Medical Concerns:  RN CC spoke with the patient's wife today in regards to follow up on some insulin changes made by his PCP on 8/30. Per the wife the patient has been using 18 units at night due to his sugars being over 150 in the morning. At this time the wife stated that his sugars have leveled off and he has been watching his sweets intake. She hopes that he will continue to stay stable at 18 units. She does understand that if needed she can increase up to 24 units but if that happens she will call the clinic.   She also stated that the patient seems to be really well. He continues to walk nightly and gain his strength. She had no other concerns at this time.      Education Provided to patient: Care Coordinator transition. RN CC will send letter in the mail.      Patient/Caregiver understanding: Patient verbalized understanding, engaged in AIDET communication behavior during encounter.         Future Appointments              In 2 weeks Janis Greene PA-C [facility]    In 2 months Matt Morrissey MD [facility]          Plan:     RN CC will send a letter in the mail with how to get in touch with Care Coordinator in the clinic.   RN CC will do no further outreaches at this time.     Reina Ramirez RN  Clinic Care Coordinator  [phone]  [email or web address]

## 2018-09-06 NOTE — LETTER
[place] CARE COORDINATION    September 6, 2018    Dustin Pearce  [address]  [facility] [id number]      Dear Dustin and Halina,    It has been such a pleasure to work with you both in regards to Dustin's health.   I am sad to leave the clinic but please know my coworkers will be available and do a great job with your concerns or questions.     Vanessa is the clinic  available at [facility]. If an RN is needed Vanessa will be able to reach out and have an RN get in touch with you.     To reach Vanessa, please call the clinic at [phone] and ask to speak with a Care Coordinator with concerns.     Again it has been a pleasure working with you and I wish you both the best.     Thank you,   Reina Ramirez, RN Care Coordinator.

## 2018-09-25 ENCOUNTER — OFFICE VISIT (OUTPATIENT)
Dept: CARDIOLOGY | Facility: CLINIC | Age: 83
End: 2018-09-25
Payer: MEDICARE

## 2018-09-25 VITALS
DIASTOLIC BLOOD PRESSURE: 72 MMHG | WEIGHT: 163 LBS | HEIGHT: 70 IN | HEART RATE: 50 BPM | BODY MASS INDEX: 23.34 KG/M2 | SYSTOLIC BLOOD PRESSURE: 106 MMHG | OXYGEN SATURATION: 99 %

## 2018-09-25 DIAGNOSIS — I48.0 PAROXYSMAL ATRIAL FIBRILLATION (H): ICD-10-CM

## 2018-09-25 DIAGNOSIS — I25.118 CORONARY ARTERY DISEASE OF NATIVE ARTERY OF NATIVE HEART WITH STABLE ANGINA PECTORIS (H): Primary | ICD-10-CM

## 2018-09-25 DIAGNOSIS — I25.5 ISCHEMIC CARDIOMYOPATHY: ICD-10-CM

## 2018-09-25 PROCEDURE — 99214 OFFICE O/P EST MOD 30 MIN: CPT | Performed by: PHYSICIAN ASSISTANT

## 2018-09-25 NOTE — PROGRESS NOTES
Cardiology Progress Note    Date of Service: 09/25/2018  Patient seen today in follow up of: CAD  Primary cardiologist: Dr. Samuel      HPI:  Dustin Pearce is a very pleasant 89 year old male who is here today for routine cardiology follow up. He has a history of coronary artery disease s/p stenting of the proximal LAD and proximal circumflex with a known  of the RCA, DM type II, hypertension, dyslipidemia, chronic systolic congestive heart failure, ischemic cardiomyopathy, anxiety, prior TIA. Additionally, he has a history of peripheral arterial disease s/p right carotid endarterectomy, left carotid occlusion and s/p left common iliac artery stenting and left SFA angioplasty.     He was admitted in May with an NSTEMI. Coronary angiography showed severe three vessel disease that showed severe three-vessel disease including  of the RCA, severe proximal LAD and severe proximal left circumflex disease.  He underwent a drug-eluting stent to the LAD and circumflex.  The cath was performed by a right femoral approach.  He was noted to have severe left subclavian and axillary artery disease.  An echocardiogram revealed that his LVEF was estimated at 35-40% with severe inferolateral wall hypokinesis.  There is moderate inferior wall hypokinesis as well.  The RV was normal size and function and the LA was mildly dilated.  There was mild to moderate aortic stenosis.    He was readmitted shortly after with recurrent chest discomfort. He was diuresed with oral lasix and was discharged on 20 mg of lasix daily. He was seen in clinic on 6/8 and was doing well at that time.     He is now here today for routine follow up with his wife. Since his last visit, he has been doing quite well. He denies any symptoms of chest discomfort or shortness of breath. No orthopnea or PND. He has been walking a few blocks every day without symptoms, although fatigues quite easily. His weights have been stable. He uses lasix just as needed and  does not need it very often.    ASSESSMENT/PLAN:  1.  Coronary artery disease. With stenting of the LAD and circumflex in May with known  of the RCA. He remains on dual antiplatelet therapy with aspirin and plavix. Additionally, he remains on atorvastatin 40 mg daily, lisinopril 5 mg daily, metoprolol 25 mg twice daily, and imdur 60 mg daily. He has no complaints of angina today.   2.  Ischemic cardiomyopathy with chronic systolic congestive heart failure. LVEF was 35 - 40% by echocardiogram. He is on beta blocker and ace inhibitor therapy. At this point, he is requiring diuretics only as needed.  3.  Hypertension. Well controlled  4.  PAD. S/p follows with Dr. Rodriguez.  5.  Hyperlipidemia. Well controlled on atorvastatin.     Dustin overall seems to be doing fairly well. He is on good medical therapy. I have asked him to see Dr. Samuel in 4 - 5 months for his annual follow up. Certainly, I asked them to contact us sooner with any concerns.     This note was completed in part using Dragon voice recognition software. Although reviewed after completion, some word and grammatical errors may occur.    Orders this Visit:  Orders Placed This Encounter   Procedures     Follow-Up with Cardiologist     No orders of the defined types were placed in this encounter.    There are no discontinued medications.    CURRENT MEDICATIONS:  Current Outpatient Prescriptions   Medication Sig Dispense Refill     AMITRIPTYLINE HCL PO Take 25 mg by mouth nightly as needed for sleep       aspirin EC 81 MG EC tablet Take 1 tablet (81 mg) by mouth daily 30 tablet 0     ATORVASTATIN CALCIUM PO Take 40 mg by mouth At Bedtime        BASAGLAR 100 UNIT/ML injection Inject 14 Units Subcutaneous daily 15 mL 11     bisacodyl (DULCOLAX) 10 MG Suppository Place 10 mg rectally daily as needed for constipation       blood glucose monitoring (TRUE METRIX BLOOD GLUCOSE TEST) test strip USE TO TEST BLOOD SUGAR THREE TIMES DAILY OR AS DIRECTED 300 strip 1      "clopidogrel (PLAVIX) 75 MG tablet TAKE 1 TABLET BY MOUTH DAILY 90 tablet 2     DULoxetine (CYMBALTA) 30 MG EC capsule TAKE 1 CAPSULE(30 MG) BY MOUTH DAILY 90 capsule 0     ferrous gluconate (FERGON) 324 (38 Fe) MG tablet Take 1 tablet (324 mg) by mouth daily (with breakfast) 100 tablet 0     furosemide (LASIX) 20 MG tablet Take 1 tablet (20 mg) by mouth every other day Hold if systolic bp < 100 or patients appears dehydrated or having poor oral intake 30 tablet 0     ipratropium (ATROVENT) 0.03 % spray INHALE 1 SPRAY IN EACH NOSTRIL EVERY 12 HOURS AS NEEDED 30 mL 1     isosorbide mononitrate (IMDUR) 30 MG 24 hr tablet Take 2 tablets (60 mg) by mouth daily 60 tablet 0     lisinopril (PRINIVIL/ZESTRIL) 5 MG tablet Take 1 tablet (5 mg) by mouth daily 30 tablet 1     MAGNESIUM-OXIDE 400 (241.3 Mg) MG tablet TAKE 1 TABLET BY MOUTH TWICE DAILY 180 tablet 1     METOPROLOL TARTRATE PO Take 25 mg by mouth 2 times daily       nitroGLYcerin (NITROSTAT) 0.4 MG sublingual tablet For chest pain place 1 tablet under the tongue every 5 minutes for 3 doses. If symptoms persist 5 minutes after 1st dose call 911. 25 tablet 0     omeprazole (PRILOSEC) 40 MG capsule TAKE 1 CAPSULE(40 MG) BY MOUTH DAILY 30 TO 60 MINUTES BEFORE A MEAL 90 capsule 0     OMEPRAZOLE PO Take 40 mg by mouth 2 times daily        order for DME Equipment being ordered: True Matrix Blood Glucose meter. 1 Act 11     polyethylene glycol (MIRALAX/GLYCOLAX) Packet Take 17 g by mouth daily as needed for constipation       tamsulosin (FLOMAX) 0.4 MG capsule Take 1 capsule (0.4 mg) by mouth daily 90 capsule 3     Cholecalciferol 05758 UNITS TABS Take 1 tablet by mouth three times a week (Mon, Wed, Fri)       ALLERGIES  Allergies   Allergen Reactions     Oxycodone Other (See Comments)     \"TERRIBLE SWEATING\"     Sulfa Drugs      Tetracycline      PAST MEDICAL HISTORY:  Past Medical History:   Diagnosis Date     Aortic root dilatation (H)      Aortic stenosis      Benign " essential hypertension 1/6/2017     Benign non-nodular prostatic hyperplasia with lower urinary tract symptoms 10/20/2016     CAD (coronary artery disease)     MI 1970     Cardiomyopathy (H)      Carotid artery stenosis 10/20/2016    chronically occluded left internal carotid artery     Carotid occlusion, left      Coronary artery disease involving native coronary artery of native heart without angina pectoris 10/20/2016     Diabetes mellitus (H)      DJD (degenerative joint disease)      Gastro-oesophageal reflux disease      Gastroesophageal reflux disease without esophagitis 10/20/2016     Heart attack      Hyperlipidemia      Hypertension      Mild cognitive impairment 10/20/2016     Nephrolithiasis     RECURRENT     Osteopenia      PAD (peripheral artery disease) (H)     peripheral angiogram 5/2017: The common iliac artery stenoses were stented and the superficial femoral artery stenoses were angioplastied     Paroxysmal atrial fibrillation (H)      PONV (postoperative nausea and vomiting)      RBBB with left anterior fascicular block      Unspecified cerebral artery occlusion with cerebral infarction      PAST SURGICAL HISTORY:  Past Surgical History:   Procedure Laterality Date     AMPUTATE FOOT Left 6/4/2017    Procedure: AMPUTATE FOOT;  Sun Add#3: partial left foot amputation - Sagital Saw - Mini C-Arm;  Surgeon: Moe Kirk DPM;  Location:  OR     CHOLECYSTECTOMY       ENDARTERECTOMY CAROTID Right 1/3/2018    Procedure: ENDARTERECTOMY CAROTID;  RIGHT CAROTID ENDARTERECTOMY WITH EEG;  Surgeon: Roland Rodriguez MD;  Location:  OR     ESOPHAGOSCOPY, GASTROSCOPY, DUODENOSCOPY (EGD), COMBINED N/A 12/26/2016    Procedure: COMBINED ESOPHAGOSCOPY, GASTROSCOPY, DUODENOSCOPY (EGD);  Surgeon: Nasim Louis MD;  Location:  GI     GENITOURINARY SURGERY      KIDNEY STONE REMOVAL X 4     HC UGI ENDOSCOPY W EUS N/A 12/29/2016    Procedure: COMBINED ENDOSCOPIC ULTRASOUND, ESOPHAGOSCOPY,  GASTROSCOPY, DUODENOSCOPY (EGD);  Surgeon: Nasim Louis MD;  Location:  GI     HERNIA REPAIR       INCISION AND DRAINAGE FOOT, COMBINED Left 6/6/2017    Procedure: COMBINED INCISION AND DRAINAGE FOOT;  REVISIONAL LEFT FOOT INCISION AND DRAINAGE WITH POSSIBLE FLAP CLOSURE , ANTIBIOTIC SOLUTION ;  Surgeon: Nabil Ann DPM;  Location:  OR     LASER HOLMIUM LITHOTRIPSY URETER(S), INSERT STENT, COMBINED  3/2/2012    Procedure:COMBINED CYSTOSCOPY, URETEROSCOPY, LASER HOLMIUM LITHOTRIPSY URETER(S), INSERT STENT; CYSTOSCOPY, RIGHT RETROGRADES, RIGHT URETEROSCOPY, HOLMIUM LASER, RIGHT STENT PLACEMENT; Surgeon:ANJELICA LEÓN; Location: OR     ROTATOR CUFF REPAIR RT/LT       FAMILY HISTORY:  Family History   Problem Relation Age of Onset     Breast Cancer Mother      Heart Failure Father 81     SOCIAL HISTORY:  Social History     Social History     Marital status:      Spouse name: N/A     Number of children: N/A     Years of education: N/A     Social History Main Topics     Smoking status: Former Smoker     Packs/day: 1.00     Years: 30.00     Types: Cigarettes     Quit date: 1/1/1999     Smokeless tobacco: Never Used     Alcohol use 4.2 oz/week     7 Standard drinks or equivalent per week      Comment: 1 drink per biweekly     Drug use: No     Sexual activity: Not Currently     Partners: Female     Other Topics Concern     Not on file     Social History Narrative     Review of Systems:  Skin:  Positive for bruising   Eyes:  Positive for glasses  ENT:  Positive for hearing loss  Respiratory:  Positive for cough;wheezing  Cardiovascular:  Negative dizziness;Positive for;edema;lightheadedness;fatigue  Gastroenterology: Positive for    Genitourinary:  Negative    Musculoskeletal:  Positive for arthritis  Neurologic:  Positive for stroke  Psychiatric:  Positive for sleep disturbances  Heme/Lymph/Imm:  Negative    Endocrine:  Positive for diabetes     Physical Exam:  Vitals: /72   "Pulse 50  Ht 1.778 m (5' 10\")  Wt 73.9 kg (163 lb)  SpO2 99%  BMI 23.39 kg/m2   Wt Readings from Last 4 Encounters:   09/25/18 73.9 kg (163 lb)   08/30/18 74.3 kg (163 lb 14.4 oz)   06/27/18 73.4 kg (161 lb 14.4 oz)   06/15/18 70.4 kg (155 lb 3.2 oz)     GEN: well nourished, in no acute distress.  HEENT:  Pupils equal, round. Sclerae nonicteric.   NECK: Supple, no masses appreciated.   C/V:  Regular rate and rhythm, no murmur, rub or gallop.    RESP: Respirations are unlabored. Clear to auscultation bilaterally without wheezing, rales, or rhonchi.  GI: Abdomen soft, nontender.  EXTREM: no LE edema.  NEURO: Alert and oriented, cooperative.  SKIN: Warm and dry.     Recent Lab Results:  LIPID RESULTS:  Lab Results   Component Value Date    CHOL 133 12/31/2017    HDL 43 12/31/2017    LDL 61 12/31/2017    TRIG 147 12/31/2017    CHOLHDLRATIO 5.1 (H) 12/18/2015     LIVER ENZYME RESULTS:  Lab Results   Component Value Date    AST 15 06/01/2018    ALT 14 06/01/2018     CBC RESULTS:  Lab Results   Component Value Date    WBC 7.5 06/04/2018    RBC 3.38 (A) 06/04/2018    HGB 10.6 (A) 06/13/2018    HCT 3.5 (A) 06/04/2018    MCV 93 06/04/2018    MCH 29.0 06/04/2018    MCHC 31.1 (A) 06/04/2018    RDW 14.8 06/04/2018     06/04/2018     BMP RESULTS:  Lab Results   Component Value Date     06/13/2018    POTASSIUM 4.5 06/13/2018    CHLORIDE 104 06/13/2018    CO2 29 06/13/2018    ANIONGAP 6 06/13/2018     (A) 06/13/2018    BUN 23 06/13/2018    CR 0.91 06/13/2018    GFRESTIMATED 78 06/13/2018    GFRESTBLACK >60 06/13/2018    ALLISON 9.1 06/13/2018      A1C RESULTS:  Lab Results   Component Value Date    A1C 8.7 (H) 08/30/2018     INR RESULTS:  Lab Results   Component Value Date    INR 0.99 12/31/2017    INR 1.03 01/20/2017       New/Pertinent imaging results since last visit:  None    Janis Greene PA-C  [facility]    "

## 2018-09-25 NOTE — LETTER
9/25/2018    Matt Morrissey MD  [address]  [facility] [address]    RE: Dustin Pearce       Dear Colleague,    I had the pleasure of seeing Dustin Pearce in the [facility].      Cardiology Progress Note    Date of Service: 09/25/2018  Patient seen today in follow up of: CAD  Primary cardiologist: Dr. Samuel      HPI:  Dustin Pearce is a very pleasant 89 year old male who is here today for routine cardiology follow up. He has a history of coronary artery disease s/p stenting of the proximal LAD and proximal circumflex with a known  of the RCA, DM type II, hypertension, dyslipidemia, chronic systolic congestive heart failure, ischemic cardiomyopathy, anxiety, prior TIA. Additionally, he has a history of peripheral arterial disease s/p right carotid endarterectomy, left carotid occlusion and s/p left common iliac artery stenting and left SFA angioplasty.     He was admitted in May with an NSTEMI. Coronary angiography showed severe three vessel disease that showed severe three-vessel disease including  of the RCA, severe proximal LAD and severe proximal left circumflex disease.  He underwent a drug-eluting stent to the LAD and circumflex.  The cath was performed by a right femoral approach.  He was noted to have severe left subclavian and axillary artery disease.  An echocardiogram revealed that his LVEF was estimated at 35-40% with severe inferolateral wall hypokinesis.  There is moderate inferior wall hypokinesis as well.  The RV was normal size and function and the LA was mildly dilated.  There was mild to moderate aortic stenosis.    He was readmitted shortly after with recurrent chest discomfort. He was diuresed with oral lasix and was discharged on 20 mg of lasix daily. He was seen in clinic on 6/8 and was doing well at that time.     He is now here today for routine follow up with his wife. Since his last visit, he has been doing quite well. He denies any symptoms  of chest discomfort or shortness of breath. No orthopnea or PND. He has been walking a few blocks every day without symptoms, although fatigues quite easily. His weights have been stable. He uses lasix just as needed and does not need it very often.    ASSESSMENT/PLAN:  1.  Coronary artery disease. With stenting of the LAD and circumflex in May with known  of the RCA. He remains on dual antiplatelet therapy with aspirin and plavix. Additionally, he remains on atorvastatin 40 mg daily, lisinopril 5 mg daily, metoprolol 25 mg twice daily, and imdur 60 mg daily. He has no complaints of angina today.   2.  Ischemic cardiomyopathy with chronic systolic congestive heart failure. LVEF was 35 - 40% by echocardiogram. He is on beta blocker and ace inhibitor therapy. At this point, he is requiring diuretics only as needed.  3.  Hypertension. Well controlled  4.  PAD. S/p follows with Dr. Rodriguez.  5.  Hyperlipidemia. Well controlled on atorvastatin.     Dustin overall seems to be doing fairly well. He is on good medical therapy. I have asked him to see Dr. Samuel in 4 - 5 months for his annual follow up. Certainly, I asked them to contact us sooner with any concerns.     This note was completed in part using Dragon voice recognition software. Although reviewed after completion, some word and grammatical errors may occur.    Orders this Visit:  Orders Placed This Encounter   Procedures     Follow-Up with Cardiologist     No orders of the defined types were placed in this encounter.    There are no discontinued medications.    CURRENT MEDICATIONS:  Current Outpatient Prescriptions   Medication Sig Dispense Refill     AMITRIPTYLINE HCL PO Take 25 mg by mouth nightly as needed for sleep       aspirin EC 81 MG EC tablet Take 1 tablet (81 mg) by mouth daily 30 tablet 0     ATORVASTATIN CALCIUM PO Take 40 mg by mouth At Bedtime        BASAGLAR 100 UNIT/ML injection Inject 14 Units Subcutaneous daily 15 mL 11     bisacodyl (DULCOLAX)  10 MG Suppository Place 10 mg rectally daily as needed for constipation       blood glucose monitoring (TRUE METRIX BLOOD GLUCOSE TEST) test strip USE TO TEST BLOOD SUGAR THREE TIMES DAILY OR AS DIRECTED 300 strip 1     clopidogrel (PLAVIX) 75 MG tablet TAKE 1 TABLET BY MOUTH DAILY 90 tablet 2     DULoxetine (CYMBALTA) 30 MG EC capsule TAKE 1 CAPSULE(30 MG) BY MOUTH DAILY 90 capsule 0     ferrous gluconate (FERGON) 324 (38 Fe) MG tablet Take 1 tablet (324 mg) by mouth daily (with breakfast) 100 tablet 0     furosemide (LASIX) 20 MG tablet Take 1 tablet (20 mg) by mouth every other day Hold if systolic bp < 100 or patients appears dehydrated or having poor oral intake 30 tablet 0     ipratropium (ATROVENT) 0.03 % spray INHALE 1 SPRAY IN EACH NOSTRIL EVERY 12 HOURS AS NEEDED 30 mL 1     isosorbide mononitrate (IMDUR) 30 MG 24 hr tablet Take 2 tablets (60 mg) by mouth daily 60 tablet 0     lisinopril (PRINIVIL/ZESTRIL) 5 MG tablet Take 1 tablet (5 mg) by mouth daily 30 tablet 1     MAGNESIUM-OXIDE 400 (241.3 Mg) MG tablet TAKE 1 TABLET BY MOUTH TWICE DAILY 180 tablet 1     METOPROLOL TARTRATE PO Take 25 mg by mouth 2 times daily       nitroGLYcerin (NITROSTAT) 0.4 MG sublingual tablet For chest pain place 1 tablet under the tongue every 5 minutes for 3 doses. If symptoms persist 5 minutes after 1st dose call 911. 25 tablet 0     omeprazole (PRILOSEC) 40 MG capsule TAKE 1 CAPSULE(40 MG) BY MOUTH DAILY 30 TO 60 MINUTES BEFORE A MEAL 90 capsule 0     OMEPRAZOLE PO Take 40 mg by mouth 2 times daily        order for DME Equipment being ordered: True Matrix Blood Glucose meter. 1 Act 11     polyethylene glycol (MIRALAX/GLYCOLAX) Packet Take 17 g by mouth daily as needed for constipation       tamsulosin (FLOMAX) 0.4 MG capsule Take 1 capsule (0.4 mg) by mouth daily 90 capsule 3     Cholecalciferol 61567 UNITS TABS Take 1 tablet by mouth three times a week (Mon, Wed, Fri)       ALLERGIES  Allergies   Allergen Reactions      "Oxycodone Other (See Comments)     \"TERRIBLE SWEATING\"     Sulfa Drugs      Tetracycline      PAST MEDICAL HISTORY:  Past Medical History:   Diagnosis Date     Aortic root dilatation (H)      Aortic stenosis      Benign essential hypertension 1/6/2017     Benign non-nodular prostatic hyperplasia with lower urinary tract symptoms 10/20/2016     CAD (coronary artery disease)     MI 1970     Cardiomyopathy (H)      Carotid artery stenosis 10/20/2016    chronically occluded left internal carotid artery     Carotid occlusion, left      Coronary artery disease involving native coronary artery of native heart without angina pectoris 10/20/2016     Diabetes mellitus (H)      DJD (degenerative joint disease)      Gastro-oesophageal reflux disease      Gastroesophageal reflux disease without esophagitis 10/20/2016     Heart attack      Hyperlipidemia      Hypertension      Mild cognitive impairment 10/20/2016     Nephrolithiasis     RECURRENT     Osteopenia      PAD (peripheral artery disease) (H)     peripheral angiogram 5/2017: The common iliac artery stenoses were stented and the superficial femoral artery stenoses were angioplastied     Paroxysmal atrial fibrillation (H)      PONV (postoperative nausea and vomiting)      RBBB with left anterior fascicular block      Unspecified cerebral artery occlusion with cerebral infarction      PAST SURGICAL HISTORY:  Past Surgical History:   Procedure Laterality Date     AMPUTATE FOOT Left 6/4/2017    Procedure: AMPUTATE FOOT;  Sun Add#3: partial left foot amputation - Sagital Saw - Mini C-Arm;  Surgeon: Moe Kirk DPM;  Location:  OR     CHOLECYSTECTOMY       ENDARTERECTOMY CAROTID Right 1/3/2018    Procedure: ENDARTERECTOMY CAROTID;  RIGHT CAROTID ENDARTERECTOMY WITH EEG;  Surgeon: Roland Rodriguez MD;  Location:  OR     ESOPHAGOSCOPY, GASTROSCOPY, DUODENOSCOPY (EGD), COMBINED N/A 12/26/2016    Procedure: COMBINED ESOPHAGOSCOPY, GASTROSCOPY, DUODENOSCOPY " (EGD);  Surgeon: Nasim Louis MD;  Location:  GI     GENITOURINARY SURGERY      KIDNEY STONE REMOVAL X 4     HC UGI ENDOSCOPY W EUS N/A 12/29/2016    Procedure: COMBINED ENDOSCOPIC ULTRASOUND, ESOPHAGOSCOPY, GASTROSCOPY, DUODENOSCOPY (EGD);  Surgeon: Nasim Louis MD;  Location:  GI     HERNIA REPAIR       INCISION AND DRAINAGE FOOT, COMBINED Left 6/6/2017    Procedure: COMBINED INCISION AND DRAINAGE FOOT;  REVISIONAL LEFT FOOT INCISION AND DRAINAGE WITH POSSIBLE FLAP CLOSURE , ANTIBIOTIC SOLUTION ;  Surgeon: Nabil Ann DPM;  Location:  OR     LASER HOLMIUM LITHOTRIPSY URETER(S), INSERT STENT, COMBINED  3/2/2012    Procedure:COMBINED CYSTOSCOPY, URETEROSCOPY, LASER HOLMIUM LITHOTRIPSY URETER(S), INSERT STENT; CYSTOSCOPY, RIGHT RETROGRADES, RIGHT URETEROSCOPY, HOLMIUM LASER, RIGHT STENT PLACEMENT; Surgeon:ANJELICA LEÓN; Location: OR     ROTATOR CUFF REPAIR RT/LT       FAMILY HISTORY:  Family History   Problem Relation Age of Onset     Breast Cancer Mother      Heart Failure Father 81     SOCIAL HISTORY:  Social History     Social History     Marital status:      Spouse name: N/A     Number of children: N/A     Years of education: N/A     Social History Main Topics     Smoking status: Former Smoker     Packs/day: 1.00     Years: 30.00     Types: Cigarettes     Quit date: 1/1/1999     Smokeless tobacco: Never Used     Alcohol use 4.2 oz/week     7 Standard drinks or equivalent per week      Comment: 1 drink per biweekly     Drug use: No     Sexual activity: Not Currently     Partners: Female     Other Topics Concern     Not on file     Social History Narrative     Review of Systems:  Skin:  Positive for bruising   Eyes:  Positive for glasses  ENT:  Positive for hearing loss  Respiratory:  Positive for cough;wheezing  Cardiovascular:  Negative dizziness;Positive for;edema;lightheadedness;fatigue  Gastroenterology: Positive for    Genitourinary:  Negative   "  Musculoskeletal:  Positive for arthritis  Neurologic:  Positive for stroke  Psychiatric:  Positive for sleep disturbances  Heme/Lymph/Imm:  Negative    Endocrine:  Positive for diabetes     Physical Exam:  Vitals: /72  Pulse 50  Ht 1.778 m (5' 10\")  Wt 73.9 kg (163 lb)  SpO2 99%  BMI 23.39 kg/m2   Wt Readings from Last 4 Encounters:   09/25/18 73.9 kg (163 lb)   08/30/18 74.3 kg (163 lb 14.4 oz)   06/27/18 73.4 kg (161 lb 14.4 oz)   06/15/18 70.4 kg (155 lb 3.2 oz)     GEN: well nourished, in no acute distress.  HEENT:  Pupils equal, round. Sclerae nonicteric.   NECK: Supple, no masses appreciated.   C/V:  Regular rate and rhythm, no murmur, rub or gallop.    RESP: Respirations are unlabored. Clear to auscultation bilaterally without wheezing, rales, or rhonchi.  GI: Abdomen soft, nontender.  EXTREM: no LE edema.  NEURO: Alert and oriented, cooperative.  SKIN: Warm and dry.     Recent Lab Results:  LIPID RESULTS:  Lab Results   Component Value Date    CHOL 133 12/31/2017    HDL 43 12/31/2017    LDL 61 12/31/2017    TRIG 147 12/31/2017    CHOLHDLRATIO 5.1 (H) 12/18/2015     LIVER ENZYME RESULTS:  Lab Results   Component Value Date    AST 15 06/01/2018    ALT 14 06/01/2018     CBC RESULTS:  Lab Results   Component Value Date    WBC 7.5 06/04/2018    RBC 3.38 (A) 06/04/2018    HGB 10.6 (A) 06/13/2018    HCT 3.5 (A) 06/04/2018    MCV 93 06/04/2018    MCH 29.0 06/04/2018    MCHC 31.1 (A) 06/04/2018    RDW 14.8 06/04/2018     06/04/2018     BMP RESULTS:  Lab Results   Component Value Date     06/13/2018    POTASSIUM 4.5 06/13/2018    CHLORIDE 104 06/13/2018    CO2 29 06/13/2018    ANIONGAP 6 06/13/2018     (A) 06/13/2018    BUN 23 06/13/2018    CR 0.91 06/13/2018    GFRESTIMATED 78 06/13/2018    GFRESTBLACK >60 06/13/2018    ALLISON 9.1 06/13/2018      A1C RESULTS:  Lab Results   Component Value Date    A1C 8.7 (H) 08/30/2018     INR RESULTS:  Lab Results   Component Value Date    INR 0.99 " 12/31/2017    INR 1.03 01/20/2017       New/Pertinent imaging results since last visit:  None    Janis Greene PA-C  [facility]      Thank you for allowing me to participate in the care of your patient.    Sincerely,     Janis Greene PA-C     [facility]

## 2018-09-25 NOTE — MR AVS SNAPSHOT
After Visit Summary   9/25/2018    Dustin Pearce    MRN: [id number]           Patient Information     Date Of Birth          1/31/1928        Visit Information        Provider Department      9/25/2018 12:30 PM Janis Greene PA-C [facility]   [place]        Care Instructions    Thank you for your [facility] visit today. Your provider has recommended the following:  Medication Changes:  No medication changes today  Recommendations:  Call us with any concerns.   Follow-up:  See Dr. Samuel for cardiology follow up in 4 - 5 months.  Reminder:  Please bring in all current medications, over the counter supplements and vitamin bottles to your next appointment.            [facility] HEART CARE            Follow-ups after your visit        Your next 10 appointments already scheduled     Oct 11, 2018  1:15 PM CDT   US CAROTID RIGHT with SHVUS1   [facility] ([facility]    [address]  [facility] [address]   [phone]           How do I prepare for my exam? (Food and drink instructions) No Food and Drink Restrictions.  How do I prepare for my exam? (Other instructions) IF YOUR DOCTOR ALSO PRESCRIBED SEDATION DURING THE EXAM (medicine to help you relax): You will receive separate instructions about driving, eating, and additional tests that may be necessary prior to your exam day.  What should I wear: Wear comfortable clothes.  How long does the exam take: Aspiration (no sedation treatment takes about an hour.  For paracentesis, thoracentesis or sedation plan to spend at least three hours at the hospital.  What should I bring: Bring a list of your medicines, including vitamins, minerals and over-the-counter drugs. It is safest to leave personal items at home.  Do I need a :  No  is needed.  What do I need to tell my doctor: Tell your doctor in advance: * If you are  or may be pregnant. * If you are taking Coumadin (or any other blood thinners) 5 days prior to the exam for any special instructions. * If you are diabetic to determine if your insulin needs have to be adjusted for the exam.  What should I do after the exam: Take it easy the rest of the day. You can return to normal activities the next day.  What is this test: This test uses a long, thin needle or tube to remove tissue, fluid, or other cells from your body. Pictures from an ultrasound will guide the needle to the right place. (Ultrasound uses sound waves to create pictures of the body on a video screen. You will not feel the sound waves.)  * A biopsy removes tissue or other cells from the body; we send the tissue or cells to a lab for testing. * Paracentesis removes fluid from the belly (abdomen) to relieve pressure, to test the fluid or both. * Thoracentesis removes fluid from the sac around the lungs to relieve pressure, to test the fluid or both. * Aspiration removes fluid from any part of the body, then the fluid is tested for disease or infection.  Who should I call with questions: If you have any questions, please call the Imaging Department where you will have your exam. Directions, parking instructions, and other information is available on our website, [email or web address].            Oct 11, 2018  1:45 PM CDT   Return Visit with Roland Rodriguez MD   [facility] ([facility])    [address]  [facility] [address]   [phone]            Nov 29, 2018  6:30 PM CST   Office Visit with Matt Morrissey MD   [facility]    [address] [facility] [address] [phone]           Bring a current list of meds and any records pertaining to this visit. For Physicals, please bring immunization records and any forms needing to be filled out. Please arrive 10 minutes early to  "complete paperwork.              Who to contact     If you have questions or need follow up information about today's clinic visit or your schedule please contact [facility]   [place] directly at [phone].  Normal or non-critical lab and imaging results will be communicated to you by MyChart, letter or phone within 4 business days after the clinic has received the results. If you do not hear from us within 7 days, please contact the clinic through MyChart or phone. If you have a critical or abnormal lab result, we will notify you by phone as soon as possible.  Submit refill requests through [employer] or call your pharmacy and they will forward the refill request to us. Please allow 3 business days for your refill to be completed.          Additional Information About Your Visit        Care EveryWhere ID     This is your Care EveryWhere ID. This could be used by other organizations to access your [place] medical records  [id number]        Your Vitals Were     Pulse Height Pulse Oximetry BMI (Body Mass Index)          50 1.778 m (5' 10\") 99% 23.39 kg/m2         Blood Pressure from Last 3 Encounters:   09/25/18 106/72   08/30/18 121/65   06/27/18 134/58    Weight from Last 3 Encounters:   09/25/18 73.9 kg (163 lb)   08/30/18 74.3 kg (163 lb 14.4 oz)   06/27/18 73.4 kg (161 lb 14.4 oz)              Today, you had the following     No orders found for display       Primary Care Provider Office Phone # Fax #    Matt Morrissey [phone] [address] [facility] [address]  [facility] [address]        Equal Access to Services     [facility]: Hadii aad ku hadasho Soomaali, waaxda luqadaha, qaybta kaalmada kofi, maurice miranda. So [facility] [phone].    ATENCIÓN: Si habla español, tiene a garvey disposición servicios gratuitos de asistencia lingüística. Llame al [phone].    We comply with applicable federal civil rights laws and [place] laws. We " do not discriminate on the basis of race, color, national origin, age, disability, sex, sexual orientation, or gender identity.            Thank you!     Thank you for choosing [facility] HEART [facility]  for your care. Our goal is always to provide you with excellent care. Hearing back from our patients is one way we can continue to improve our services. Please take a few minutes to complete the written survey that you may receive in the mail after your visit with us. Thank you!             Your Updated Medication List - Protect others around you: Learn how to safely use, store and throw away your medicines at www.disposemymeds.org.          This list is accurate as of 9/25/18 12:54 PM.  Always use your most recent med list.                   Brand Name Dispense Instructions for use Diagnosis    AMITRIPTYLINE HCL PO      Take 25 mg by mouth nightly as needed for sleep        aspirin 81 MG EC tablet     30 tablet    Take 1 tablet (81 mg) by mouth daily    Transient cerebral ischemia, unspecified type       ATORVASTATIN CALCIUM PO      Take 40 mg by mouth At Bedtime        BASAGLAR 100 UNIT/ML injection     15 mL    Inject 14 Units Subcutaneous daily    Type 2 diabetes mellitus with hyperglycemia, unspecified long term insulin use status (H)       bisacodyl 10 MG Suppository    DULCOLAX     Place 10 mg rectally daily as needed for constipation        blood glucose monitoring test strip    TRUE METRIX BLOOD GLUCOSE TEST    300 strip    USE TO TEST BLOOD SUGAR THREE TIMES DAILY OR AS DIRECTED    Hypoglycemia       Cholecalciferol 57225 units Tabs      Take 1 tablet by mouth three times a week (Mon, Wed, Fri)        clopidogrel 75 MG tablet    PLAVIX    90 tablet    TAKE 1 TABLET BY MOUTH DAILY    Cough       DULoxetine 30 MG EC capsule    CYMBALTA    90 capsule    TAKE 1 CAPSULE(30 MG) BY MOUTH DAILY    Polyneuropathy associated with underlying disease (H)       ferrous gluconate 324 (38 Fe) MG  tablet    FERGON    100 tablet    Take 1 tablet (324 mg) by mouth daily (with breakfast)    Iron deficiency anemia, unspecified iron deficiency anemia type       furosemide 20 MG tablet    LASIX    30 tablet    Take 1 tablet (20 mg) by mouth every other day Hold if systolic bp < 100 or patients appears dehydrated or having poor oral intake    Coronary artery disease involving native heart, angina presence unspecified, unspecified vessel or lesion type       ipratropium 0.03 % spray    ATROVENT    30 mL    INHALE 1 SPRAY IN EACH NOSTRIL EVERY 12 HOURS AS NEEDED    Runny nose       isosorbide mononitrate 30 MG 24 hr tablet    IMDUR    60 tablet    Take 2 tablets (60 mg) by mouth daily    Coronary artery disease involving native heart, angina presence unspecified, unspecified vessel or lesion type       lisinopril 5 MG tablet    PRINIVIL/ZESTRIL    30 tablet    Take 1 tablet (5 mg) by mouth daily    Coronary artery disease involving native heart, angina presence unspecified, unspecified vessel or lesion type       MAGNESIUM-OXIDE 400 (241.3 Mg) MG tablet   Generic drug:  magnesium oxide     180 tablet    TAKE 1 TABLET BY MOUTH TWICE DAILY    Constipation, unspecified constipation type       METOPROLOL TARTRATE PO      Take 25 mg by mouth 2 times daily        nitroGLYcerin 0.4 MG sublingual tablet    NITROSTAT    25 tablet    For chest pain place 1 tablet under the tongue every 5 minutes for 3 doses. If symptoms persist 5 minutes after 1st dose call 911.    Coronary artery disease involving native heart, angina presence unspecified, unspecified vessel or lesion type       * OMEPRAZOLE PO      Take 40 mg by mouth 2 times daily        * omeprazole 40 MG capsule    priLOSEC    90 capsule    TAKE 1 CAPSULE(40 MG) BY MOUTH DAILY 30 TO 60 MINUTES BEFORE A MEAL    Other acute gastritis without hemorrhage       order for DME     1 Act    Equipment being ordered: True Matrix Blood Glucose meter.    Type 2 diabetes mellitus with  complication, with long-term current use of insulin (H)       polyethylene glycol Packet    MIRALAX/GLYCOLAX     Take 17 g by mouth daily as needed for constipation        tamsulosin 0.4 MG capsule    FLOMAX    90 capsule    Take 1 capsule (0.4 mg) by mouth daily    Benign non-nodular prostatic hyperplasia with lower urinary tract symptoms       * Notice:  This list has 2 medication(s) that are the same as other medications prescribed for you. Read the directions carefully, and ask your doctor or other care provider to review them with you.

## 2018-09-25 NOTE — PATIENT INSTRUCTIONS
Thank you for your [facility] visit today. Your provider has recommended the following:  Medication Changes:  No medication changes today  Recommendations:  Call us with any concerns.   Follow-up:  See Dr. Samuel for cardiology follow up in 4 - 5 months.  Reminder:  Please bring in all current medications, over the counter supplements and vitamin bottles to your next appointment.            [facility] HEART [facility]

## 2018-09-28 DIAGNOSIS — G63 POLYNEUROPATHY ASSOCIATED WITH UNDERLYING DISEASE (H): ICD-10-CM

## 2018-09-28 NOTE — TELEPHONE ENCOUNTER
Routing refill request to provider for review/approval because:  Drug not on the [facility] refill protocol     Cindy MARX RN,BSN

## 2018-09-28 NOTE — TELEPHONE ENCOUNTER
"Last Written Prescription Date:  7/02/18  Last Fill Quantity: 90 capsule,  # refills: 0   Last office visit: 8/30/2018 with prescribing provider:  Y[place]   Future Office Visit:   Next 5 appointments (look out 90 days)     Oct 11, 2018  1:45 PM CDT   Return Visit with Roland Rodriguez MD   [facility] ([facility])    [address]  [facility] [id number] [phone]            Nov 29, 2018  6:30 PM CST   Office Visit with Matt Morrissey MD   [facility]    [address] [facility] [id number] [phone]                 Requested Prescriptions   Pending Prescriptions Disp Refills     DULoxetine (CYMBALTA) 30 MG EC capsule [Pharmacy Med Name: DULOXETINE DR 30MG CAPSULES] 90 capsule 0     Sig: TAKE 1 CAPSULE(30 MG) BY MOUTH DAILY    Serotonin-Norepinephrine Reuptake Inhibitors  Passed    9/28/2018  3:13 AM       Passed - Blood pressure under 140/90 in past 12 months    BP Readings from Last 3 Encounters:   09/25/18 106/72   08/30/18 121/65   06/27/18 134/58                Passed - Recent (12 mo) or future (30 days) visit within the authorizing provider's specialty    Patient had office visit in the last 12 months or has a visit in the next 30 days with authorizing provider or within the authorizing provider's specialty.  See \"Patient Info\" tab in inbasket, or \"Choose Columns\" in Meds & Orders section of the refill encounter.           Passed - Patient is age 18 or older          "

## 2018-10-01 RX ORDER — DULOXETIN HYDROCHLORIDE 30 MG/1
CAPSULE, DELAYED RELEASE ORAL
Qty: 90 CAPSULE | Refills: 3 | Status: SHIPPED | OUTPATIENT
Start: 2018-10-01 | End: 2019-01-01

## 2018-10-11 ENCOUNTER — HOSPITAL ENCOUNTER (OUTPATIENT)
Dept: ULTRASOUND IMAGING | Facility: CLINIC | Age: 83
Discharge: HOME | End: 2018-10-11
Attending: SURGERY | Admitting: SURGERY
Payer: MEDICARE

## 2018-10-11 ENCOUNTER — OFFICE VISIT (OUTPATIENT)
Dept: OTHER | Facility: CLINIC | Age: 83
End: 2018-10-11
Attending: SURGERY
Payer: MEDICARE

## 2018-10-11 VITALS — HEART RATE: 78 BPM | SYSTOLIC BLOOD PRESSURE: 123 MMHG | DIASTOLIC BLOOD PRESSURE: 55 MMHG

## 2018-10-11 DIAGNOSIS — I65.22 CAROTID STENOSIS, SYMPTOMATIC W/O INFARCT, LEFT: Primary | ICD-10-CM

## 2018-10-11 DIAGNOSIS — Z98.890 S/P CAROTID ENDARTERECTOMY: ICD-10-CM

## 2018-10-11 DIAGNOSIS — I65.21 CAROTID ARTERY STENOSIS WITHOUT CEREBRAL INFARCTION, RIGHT: ICD-10-CM

## 2018-10-11 DIAGNOSIS — I71.40 ABDOMINAL AORTIC ANEURYSM (AAA) WITHOUT RUPTURE (H): ICD-10-CM

## 2018-10-11 PROCEDURE — 93882 EXTRACRANIAL UNI/LTD STUDY: CPT | Mod: RT

## 2018-10-11 PROCEDURE — 99213 OFFICE O/P EST LOW 20 MIN: CPT | Mod: ZP | Performed by: SURGERY

## 2018-10-11 PROCEDURE — G0463 HOSPITAL OUTPT CLINIC VISIT: HCPCS | Mod: 25

## 2018-10-11 NOTE — LETTER
[facility]  [address]  [place] [address]  Phone: [phone]  Fax: [phone]      2018    RE: Dustin Pearce, : 1928      [facility]     Dustin Pearce returns for vascular follow-up.  He has a chronically occluded left ICA.  He had a 50-60% calcified stenosis of the right carotid bifurcation that resulted in a right hemispheric TIA despite dual antiplatelet therapy.  He underwent a right CEA on 1/3/2018 with no complications or recurrent symptoms.     He has known cardiomyopathy with ejection fraction of 35% but still remains independent doing all his normal daily activities.     PMH: Medications: Aspirin, Plavix, Lipitor, lisinopril, Toprol-XL, Cymbalta, Flomax, metformin               Known 3.3 x 3 cm AAA            Very good risk factor control.  However, more recent hemoglobin              A1c= 8.7 )  ( he is scheduled to have this rechecked at the end of the                                Month) his insulin dosage was increased to 18 units nightly      Exam: Alert and appropriate.  Here with his wife.            Uses a cane for balance.              Blood pressure 123/55.  Pulse 78            Well-healed right carotid incision with no mandibular numbness.      Right carotid duplex reveals a widely patent CEA site with no evidence at all of any recurrent stenosis.      Impression: Doing very well following right CEA following TIA for moderately severe calcified stenosis.  Chronic occlusion left internal carotid artery.  Good risk factor control except for elevated hemoglobin A1c and is working on this.  Follow-up right carotid duplex ultrasound in 1 year.  Discussed with patient and his wife.                       Will recheck AAA ultrasound next year also.       Sincerely,       Roland Rodriguez MD

## 2018-10-11 NOTE — MR AVS SNAPSHOT
After Visit Summary   10/11/2018    Dustin Pearce    MRN: [id number]           Patient Information     Date Of Birth          1/31/1928        Visit Information        Provider Department      10/11/2018 1:45 PM Roland Rodriguez MD [facility]      Today's Diagnoses     Carotid stenosis, symptomatic w/o infarct, left    -  1    S/P carotid endarterectomy        Abdominal aortic aneurysm (AAA) without rupture (H)           Follow-ups after your visit        Follow-up notes from your care team     Return in about 1 year (around 10/11/2019).      Your next 10 appointments already scheduled     Oct 11, 2018  1:45 PM CDT   Return Visit with Roland Rodriguez MD   [facility] ([facility])    [address]  [facility] [id number] [phone]            Nov 29, 2018  6:30 PM CST   Office Visit with Matt Morrissey MD   [facility]    [address] [facility] [id number] [phone]           Bring a current list of meds and any records pertaining to this visit. For Physicals, please bring immunization records and any forms needing to be filled out. Please arrive 10 minutes early to complete paperwork.              Who to contact     If you have questions or need follow up information about today's clinic visit or your schedule please contact [facility] directly at [phone].  Normal or non-critical lab and imaging results will be communicated to you by MyChart, letter or phone within 4 business days after the clinic has received the results. If you do not hear from us within 7 days, please contact the clinic through MyChart or phone. If you have a critical or abnormal lab result, we will notify you by phone as soon as possible.  Submit refill requests through [email or web address] or call your  pharmacy and they will forward the refill request to us. Please allow 3 business days for your refill to be completed.          Additional Information About Your Visit        Care EveryWhere ID     This is your Care EveryWhere ID. This could be used by other organizations to access your [place] medical records  [id number]        Your Vitals Were     Pulse                   78            Blood Pressure from Last 3 Encounters:   10/11/18 123/55   09/25/18 106/72   08/30/18 121/65    Weight from Last 3 Encounters:   09/25/18 163 lb (73.9 kg)   08/30/18 163 lb 14.4 oz (74.3 kg)   06/27/18 161 lb 14.4 oz (73.4 kg)              Today, you had the following     No orders found for display       Primary Care Provider Office Phone # Fax #    Matt Morrissey [phone] [address]  [place] [address]        Equal Access to Services     [facility] : Hadii aad ku hadasho Soomaali, waaxda luqadaha, qaybta kaalmada adeegyada, waxay idiin hayiwonan bradly gama . So [facility] [phone].    ATENCIÓN: Si habla español, tiene a garvey disposición servicios gratuitos de asistencia lingüística. Jose Luis al [phone].    We comply with applicable federal civil rights laws and [place] laws. We do not discriminate on the basis of race, color, national origin, age, disability, sex, sexual orientation, or gender identity.            Thank you!     Thank you for choosing [facility] VASCULAR [place]  for your care. Our goal is always to provide you with excellent care. Hearing back from our patients is one way we can continue to improve our services. Please take a few minutes to complete the written survey that you may receive in the mail after your visit with us. Thank you!             Your Updated Medication List - Protect others around you: Learn how to safely use, store and throw away your medicines at www.disposemymeds.org.          This list is accurate as of 10/11/18  1:42 PM.  Always use  your most recent med list.                   Brand Name Dispense Instructions for use Diagnosis    AMITRIPTYLINE HCL PO      Take 25 mg by mouth nightly as needed for sleep        aspirin 81 MG EC tablet     30 tablet    Take 1 tablet (81 mg) by mouth daily    Transient cerebral ischemia, unspecified type       ATORVASTATIN CALCIUM PO      Take 40 mg by mouth At Bedtime        BASAGLAR 100 UNIT/ML injection     15 mL    Inject 14 Units Subcutaneous daily    Type 2 diabetes mellitus with hyperglycemia, unspecified long term insulin use status       bisacodyl 10 MG Suppository    DULCOLAX     Place 10 mg rectally daily as needed for constipation        blood glucose monitoring test strip    TRUE METRIX BLOOD GLUCOSE TEST    300 strip    USE TO TEST BLOOD SUGAR THREE TIMES DAILY OR AS DIRECTED    Hypoglycemia       Cholecalciferol 80516 units Tabs      Take 1 tablet by mouth three times a week (Mon, Wed, Fri)        clopidogrel 75 MG tablet    PLAVIX    90 tablet    TAKE 1 TABLET BY MOUTH DAILY    Cough       DULoxetine 30 MG EC capsule    CYMBALTA    90 capsule    TAKE 1 CAPSULE(30 MG) BY MOUTH DAILY    Polyneuropathy associated with underlying disease (H)       ferrous gluconate 324 (38 Fe) MG tablet    FERGON    100 tablet    Take 1 tablet (324 mg) by mouth daily (with breakfast)    Iron deficiency anemia, unspecified iron deficiency anemia type       furosemide 20 MG tablet    LASIX    30 tablet    Take 1 tablet (20 mg) by mouth every other day Hold if systolic bp < 100 or patients appears dehydrated or having poor oral intake    Coronary artery disease involving native heart, angina presence unspecified, unspecified vessel or lesion type       ipratropium 0.03 % spray    ATROVENT    30 mL    INHALE 1 SPRAY IN EACH NOSTRIL EVERY 12 HOURS AS NEEDED    Runny nose       isosorbide mononitrate 30 MG 24 hr tablet    IMDUR    60 tablet    Take 2 tablets (60 mg) by mouth daily    Coronary artery disease involving native  heart, angina presence unspecified, unspecified vessel or lesion type       lisinopril 5 MG tablet    PRINIVIL/ZESTRIL    30 tablet    Take 1 tablet (5 mg) by mouth daily    Coronary artery disease involving native heart, angina presence unspecified, unspecified vessel or lesion type       MAGNESIUM-OXIDE 400 (241.3 Mg) MG tablet   Generic drug:  magnesium oxide     180 tablet    TAKE 1 TABLET BY MOUTH TWICE DAILY    Constipation, unspecified constipation type       METOPROLOL TARTRATE PO      Take 25 mg by mouth 2 times daily        nitroGLYcerin 0.4 MG sublingual tablet    NITROSTAT    25 tablet    For chest pain place 1 tablet under the tongue every 5 minutes for 3 doses. If symptoms persist 5 minutes after 1st dose call 911.    Coronary artery disease involving native heart, angina presence unspecified, unspecified vessel or lesion type       * OMEPRAZOLE PO      Take 40 mg by mouth 2 times daily        * omeprazole 40 MG capsule    priLOSEC    90 capsule    TAKE 1 CAPSULE(40 MG) BY MOUTH DAILY 30 TO 60 MINUTES BEFORE A MEAL    Other acute gastritis without hemorrhage       order for DME     1 Act    Equipment being ordered: True Matrix Blood Glucose meter.    Type 2 diabetes mellitus with complication, with long-term current use of insulin (H)       polyethylene glycol Packet    MIRALAX/GLYCOLAX     Take 17 g by mouth daily as needed for constipation        tamsulosin 0.4 MG capsule    FLOMAX    90 capsule    Take 1 capsule (0.4 mg) by mouth daily    Benign non-nodular prostatic hyperplasia with lower urinary tract symptoms       * Notice:  This list has 2 medication(s) that are the same as other medications prescribed for you. Read the directions carefully, and ask your doctor or other care provider to review them with you.

## 2018-10-11 NOTE — NURSING NOTE
"Dustin Pearce is a 89 year old male who presents for:  Chief Complaint   Patient presents with     RECHECK     R carotid (1:15 [facility]; 1:45 [facility]RO) History of right carotid endarterectomy on 1/3/18; 6 month follow up to 4/19/18 appointment with Dr. Jennifer Hernandezs:    Vitals:    10/11/18 1323   BP: 123/55   BP Location: Left arm   Patient Position: Chair   Cuff Size: Adult Regular   Pulse: 78       BMI:  Estimated body mass index is 23.39 kg/(m^2) as calculated from the following:    Height as of 9/25/18: 5' 10\" (1.778 m).    Weight as of 9/25/18: 163 lb (73.9 kg).    Pain Score:  Data Unavailable        Deidra Maria"

## 2018-10-11 NOTE — PROGRESS NOTES
[place] [facility]    Dustin Pearce returns for vascular follow-up.  He has a chronically occluded left ICA.  He had a 50-60% calcified stenosis of the right carotid bifurcation that resulted in a right hemispheric TIA despite dual antiplatelet therapy.  He underwent a right CEA on 1/3/2018 with no complications or recurrent symptoms.    He has known cardiomyopathy with ejection fraction of 35% but still remains independent doing all his normal daily activities.    PMH: Medications: Aspirin, Plavix, Lipitor, lisinopril, Toprol-XL, Cymbalta, Flomax, metformin              Known 3.3 x 3 cm AAA            Very good risk factor control.  However, more recent hemoglobin              A1c= 8.7 )  ( he is scheduled to have this rechecked at the end of the                                Month) his insulin dosage was increased to 18 units nightly      Exam: Alert and appropriate.  Here with his wife.            Uses a cane for balance.              Blood pressure 123/55.  Pulse 78            Well-healed right carotid incision with no mandibular numbness.      Right carotid duplex reveals a widely patent CEA site with no evidence at all of any recurrent stenosis.        Impression: Doing very well following right CEA following TIA for moderately severe calcified stenosis.  Chronic occlusion left internal carotid artery.  Good risk factor control except for elevated hemoglobin A1c and is working on this.  Follow-up right carotid duplex ultrasound in 1 year.  Discussed with patient and his wife.                      Will recheck AAA ultrasound next year also.         Roland Rodriguez MD     Please route or send letter to:  Primary Care Provider (PCP)

## 2018-10-13 DIAGNOSIS — K29.00 OTHER ACUTE GASTRITIS WITHOUT HEMORRHAGE: ICD-10-CM

## 2018-10-13 NOTE — TELEPHONE ENCOUNTER
"Last Written Prescription Date:  7/17/18  Last Fill Quantity: 90 capsule,  # refills: 0   Last office visit: 8/30/2018 with prescribing provider:  Yuni   Future Office Visit:   Next 5 appointments (look out 90 days)     Nov 29, 2018  6:30 PM CST   Office Visit with Matt Morrissey MD   [facility]    [address] [facility] [id number] [phone]                 Requested Prescriptions   Pending Prescriptions Disp Refills     omeprazole (PRILOSEC) 40 MG capsule [Pharmacy Med Name: OMEPRAZOLE 40MG CAPSULES] 90 capsule 0     Sig: TAKE 1 CAPSULE(40 MG) BY MOUTH DAILY 30 TO 60 MINUTES BEFORE A MEAL    PPI Protocol Failed    10/13/2018  3:13 AM       Failed - Not on Clopidogrel (unless Pantoprazole ordered)       Passed - No diagnosis of osteoporosis on record       Passed - Recent (12 mo) or future (30 days) visit within the authorizing provider's specialty    Patient had office visit in the last 12 months or has a visit in the next 30 days with authorizing provider or within the authorizing provider's specialty.  See \"Patient Info\" tab in inbasket, or \"Choose Columns\" in Meds & Orders section of the refill encounter.           Passed - Patient is age 18 or older          "

## 2018-10-15 RX ORDER — OMEPRAZOLE 40 MG/1
CAPSULE, DELAYED RELEASE ORAL
Qty: 90 CAPSULE | Refills: 0 | Status: SHIPPED | OUTPATIENT
Start: 2018-10-15 | End: 2019-01-11

## 2018-11-29 ENCOUNTER — OFFICE VISIT (OUTPATIENT)
Dept: FAMILY MEDICINE | Facility: CLINIC | Age: 83
End: 2018-11-29
Payer: MEDICARE

## 2018-11-29 VITALS
DIASTOLIC BLOOD PRESSURE: 60 MMHG | OXYGEN SATURATION: 94 % | BODY MASS INDEX: 24.21 KG/M2 | WEIGHT: 169.1 LBS | HEART RATE: 68 BPM | HEIGHT: 70 IN | SYSTOLIC BLOOD PRESSURE: 136 MMHG

## 2018-11-29 DIAGNOSIS — E11.42 DM TYPE 2 WITH DIABETIC PERIPHERAL NEUROPATHY (H): Primary | ICD-10-CM

## 2018-11-29 DIAGNOSIS — I10 BENIGN ESSENTIAL HYPERTENSION: ICD-10-CM

## 2018-11-29 LAB — HBA1C MFR BLD: 8.1 % (ref 0–5.6)

## 2018-11-29 PROCEDURE — 83036 HEMOGLOBIN GLYCOSYLATED A1C: CPT | Performed by: INTERNAL MEDICINE

## 2018-11-29 PROCEDURE — 36415 COLL VENOUS BLD VENIPUNCTURE: CPT | Performed by: INTERNAL MEDICINE

## 2018-11-29 PROCEDURE — 99213 OFFICE O/P EST LOW 20 MIN: CPT | Performed by: INTERNAL MEDICINE

## 2018-11-29 NOTE — PROGRESS NOTES
SUBJECTIVE:   Dustin Pearce is a 89 year old male who presents to clinic today for the following health issues:      Diabetes Follow-up    Patient is checking blood sugars: twice daily.    Blood sugar testing frequency justification: Frequent hypoglycemia  Results are as follows:         am - 112-149 & PM - 129-170    Diabetic concerns: None     Symptoms of hypoglycemia (low blood sugar): dizzy     Paresthesias (numbness or burning in feet) or sores: No     Date of last diabetic eye exam: 6 weeks ago.     BP Readings from Last 2 Encounters:   10/11/18 123/55   09/25/18 106/72     Hemoglobin A1C (%)   Date Value   08/30/2018 8.7 (H)   05/27/2018 9.1 (H)     LDL Cholesterol Calculated (mg/dL)   Date Value   12/31/2017 61   12/01/2017 39       Diabetes Management Resources    Amount of exercise or physical activity: None    Problems taking medications regularly: No    Medication side effects: none    Diet: diabetic        Mr. Sidhuurns in follow-up regarding his diabetes, hypertension, neuropathy.  He feels well he has no specific complaints.  He did get right olecranon bursitis several weeks ago, but this has calmed down, and now he is asymptomatic from that.  He has been working on his diet to improve his glycemic control.  His wife gives him 18 units of basal insulin daily.  He sees his podiatrist regularly for foot care.    Problem list and histories reviewed & adjusted, as indicated.  Additional history: as documented    Patient Active Problem List   Diagnosis     Coronary artery disease of native artery of native heart with stable angina pectoris (H)     Hyperlipidemia, unspecified hyperlipidemia type     Benign non-nodular prostatic hyperplasia with lower urinary tract symptoms     Gastroesophageal reflux disease without esophagitis     Cerebrovascular accident (H)     Carotid artery stenosis     Abdominal aortic aneurysm (H)     Lumbar back pain     Benign essential hypertension     TIA (transient  ischemic attack)     Paroxysmal atrial fibrillation (H)     Ischemic cardiomyopathy     Aortic root dilatation (H)     Physical deconditioning     Diabetic ulcer of left foot associated with diabetes mellitus due to underlying condition (H)     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)     Anemia due to blood loss, acute     Diarrhea, unspecified type     Nausea and vomiting, intractability of vomiting not specified, unspecified vomiting type     Acute pain of left shoulder     Balance problems     Generalized muscle weakness     Peripheral artery disease (H)     Aortic stenosis     RBBB with left anterior fascicular block     Stroke of unknown etiology (H)     Carotid stenosis     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)     History of TIA (transient ischemic attack) and stroke     UTI (urinary tract infection)     UTI (urinary tract infection) due to Enterococcus     Generalized weakness     Type 2 diabetes mellitus with hyperglycemia, unspecified long term insulin use status     Atherosclerosis of coronary artery of native heart without angina pectoris, unspecified vessel or lesion type     Depression, unspecified depression type     Slow transit constipation     Severe sepsis (H)     Chest discomfort     NSTEMI (non-ST elevated myocardial infarction) (H)     Anemia     Cognitive impairment     Acute on chronic systolic congestive heart failure (H)     Benign prostatic hyperplasia without lower urinary tract symptoms     Past Surgical History:   Procedure Laterality Date     AMPUTATE FOOT Left 6/4/2017    Procedure: AMPUTATE FOOT;  Sun Add#3: partial left foot amputation - Sagital Saw - Mini C-Arm;  Surgeon: Moe Kirk DPM;  Location:  OR     CHOLECYSTECTOMY       ENDARTERECTOMY CAROTID Right 1/3/2018    Procedure: ENDARTERECTOMY CAROTID;  RIGHT CAROTID ENDARTERECTOMY WITH EEG;  Surgeon: Roland Rodriguez MD;  Location:  OR     ESOPHAGOSCOPY, GASTROSCOPY,  DUODENOSCOPY (EGD), COMBINED N/A 12/26/2016    Procedure: COMBINED ESOPHAGOSCOPY, GASTROSCOPY, DUODENOSCOPY (EGD);  Surgeon: Nasim Louis MD;  Location:  GI     GENITOURINARY SURGERY      KIDNEY STONE REMOVAL X 4     HC UGI ENDOSCOPY W EUS N/A 12/29/2016    Procedure: COMBINED ENDOSCOPIC ULTRASOUND, ESOPHAGOSCOPY, GASTROSCOPY, DUODENOSCOPY (EGD);  Surgeon: Nasim Louis MD;  Location:  GI     HERNIA REPAIR       INCISION AND DRAINAGE FOOT, COMBINED Left 6/6/2017    Procedure: COMBINED INCISION AND DRAINAGE FOOT;  REVISIONAL LEFT FOOT INCISION AND DRAINAGE WITH POSSIBLE FLAP CLOSURE , ANTIBIOTIC SOLUTION ;  Surgeon: Nabil Ann DPM;  Location:  OR     LASER HOLMIUM LITHOTRIPSY URETER(S), INSERT STENT, COMBINED  3/2/2012    Procedure:COMBINED CYSTOSCOPY, URETEROSCOPY, LASER HOLMIUM LITHOTRIPSY URETER(S), INSERT STENT; CYSTOSCOPY, RIGHT RETROGRADES, RIGHT URETEROSCOPY, HOLMIUM LASER, RIGHT STENT PLACEMENT; Surgeon:ANJELICA LEÓN; Location: OR     ROTATOR CUFF REPAIR RT/LT         Social History   Substance Use Topics     Smoking status: Former Smoker     Packs/day: 1.00     Years: 30.00     Types: Cigarettes     Quit date: 1/1/1999     Smokeless tobacco: Never Used     Alcohol use 4.2 oz/week     7 Standard drinks or equivalent per week      Comment: 1 drink per biweekly     Family History   Problem Relation Age of Onset     Breast Cancer Mother      Heart Failure Father 81         Current Outpatient Prescriptions   Medication Sig Dispense Refill     AMITRIPTYLINE HCL PO Take 25 mg by mouth nightly as needed for sleep       aspirin EC 81 MG EC tablet Take 1 tablet (81 mg) by mouth daily 30 tablet 0     ATORVASTATIN CALCIUM PO Take 40 mg by mouth At Bedtime        BASAGLAR 100 UNIT/ML injection Inject 14 Units Subcutaneous daily 15 mL 11     bisacodyl (DULCOLAX) 10 MG Suppository Place 10 mg rectally daily as needed for constipation       blood glucose monitoring (NO BRAND  SPECIFIED) meter device kit Use to test blood sugar bid times daily or as directed. 1 kit 0     blood glucose monitoring (TRUE METRIX BLOOD GLUCOSE TEST) test strip USE TO TEST BLOOD SUGAR THREE TIMES DAILY OR AS DIRECTED 300 strip 1     Cholecalciferol 59761 UNITS TABS Take 1 tablet by mouth three times a week (Mon, Wed, Fri)       clopidogrel (PLAVIX) 75 MG tablet TAKE 1 TABLET BY MOUTH DAILY 90 tablet 2     DULoxetine (CYMBALTA) 30 MG EC capsule TAKE 1 CAPSULE(30 MG) BY MOUTH DAILY 90 capsule 3     ferrous gluconate (FERGON) 324 (38 Fe) MG tablet Take 1 tablet (324 mg) by mouth daily (with breakfast) 100 tablet 0     furosemide (LASIX) 20 MG tablet Take 1 tablet (20 mg) by mouth every other day Hold if systolic bp < 100 or patients appears dehydrated or having poor oral intake 30 tablet 0     ipratropium (ATROVENT) 0.03 % spray INHALE 1 SPRAY IN EACH NOSTRIL EVERY 12 HOURS AS NEEDED 30 mL 1     isosorbide mononitrate (IMDUR) 30 MG 24 hr tablet Take 2 tablets (60 mg) by mouth daily 60 tablet 0     lisinopril (PRINIVIL/ZESTRIL) 5 MG tablet Take 1 tablet (5 mg) by mouth daily 30 tablet 1     MAGNESIUM-OXIDE 400 (241.3 Mg) MG tablet TAKE 1 TABLET BY MOUTH TWICE DAILY 180 tablet 1     metFORMIN (GLUCOPHAGE) 1000 MG tablet TAKE 1 TABLET BY MOUTH TWICE DAILY WITH MEALS 180 tablet 3     METOPROLOL TARTRATE PO Take 25 mg by mouth 2 times daily       nitroGLYcerin (NITROSTAT) 0.4 MG sublingual tablet For chest pain place 1 tablet under the tongue every 5 minutes for 3 doses. If symptoms persist 5 minutes after 1st dose call 911. 25 tablet 0     omeprazole (PRILOSEC) 40 MG capsule TAKE 1 CAPSULE(40 MG) BY MOUTH DAILY 30 TO 60 MINUTES BEFORE A MEAL 90 capsule 0     OMEPRAZOLE PO Take 40 mg by mouth 2 times daily        order for DME Equipment being ordered: True Matrix Blood Glucose meter. 1 Act 11     polyethylene glycol (MIRALAX/GLYCOLAX) Packet Take 17 g by mouth daily as needed for constipation       tamsulosin (FLOMAX)  "0.4 MG capsule TAKE ONE CAPSULE BY MOUTH DAILY 90 capsule 3     Allergies   Allergen Reactions     Oxycodone Other (See Comments)     \"TERRIBLE SWEATING\"     Sulfa Drugs      Tetracycline        Reviewed and updated as needed this visit by clinical staff       Reviewed and updated as needed this visit by Provider         ROS:  Constitutional, HEENT, cardiovascular, pulmonary, gi and gu systems are negative, except as otherwise noted.    OBJECTIVE:     /60  Pulse 68  Ht 5' 10\" (1.778 m)  Wt 169 lb 1.6 oz (76.7 kg)  SpO2 94%  BMI 24.26 kg/m2  Body mass index is 24.26 kg/(m^2).  General: This is a well-appearing man in no acute distress.  He is slightly hard of hearing.  He has no evidence of current right olecranon bursitis.  Feet: There are no obvious new foot ulcers, he has severe decreased sensation in both feet.    Diagnostic Test Results:  Results for orders placed or performed in visit on 11/29/18   Hemoglobin A1c   Result Value Ref Range    Hemoglobin A1C 8.1 (H) 0 - 5.6 %       ASSESSMENT/PLAN:         ICD-10-CM    1. 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 Benign essential hypertension I10      A1c trending towards goal of that less than 8.  Continue with diet modification to achieve this.  Recheck hemoglobin A1c in 3 months.  Continue regular follow-up with podiatry regarding foot cares.  Blood pressure on second check in clinic today is under good control.  Return sooner if there is recurrent problems with right olecranon bursitis.      Matt Morrissey MD  [facility]    "

## 2018-11-29 NOTE — LETTER
[place] [address] [address] [facility]  [address]  [place]  [address]  Tel: [phone]    December 3, 2018    Dustin Pearce  [address] [facility] [address]        Dear Mr. Pearce,    The following letter pertains to your most recent diagnostic tests:     As we discussed in clinic your A1c is improved and close to your goal of A1c less than 8.  We should recheck in 3 months.      If you have any further questions or problems, please contact our office.      Sincerely,    Matt Morrissey MD/aaron          Enclosure: Lab Results        Results for orders placed or performed in visit on 11/29/18   Hemoglobin A1c   Result Value Ref Range    Hemoglobin A1C 8.1 (H) 0 - 5.6 %

## 2018-11-29 NOTE — MR AVS SNAPSHOT
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              Follow-ups after your visit        Follow-up notes from your care team     Return in about 3 months (around 2/28/2019) for Diabetes Check.      Who to contact     If you have questions or need follow up information about today's clinic visit or your schedule please contact [facility] directly at [phone].  Normal or non-critical lab and imaging results will be communicated to you by MyChart, letter or phone within 4 business days after the clinic has received the results. If you do not hear from us within 7 days, please contact the clinic through MyChart or phone. If you have a critical or abnormal lab result, we will notify you by phone as soon as possible.  Submit refill requests through [employer] or call your pharmacy and they will forward the refill request to us. Please allow 3 business days for your refill to be completed.          Additional Information About Your Visit        Care EveryWhere ID     This is your Care EveryWhere ID. This could be used by other organizations to access your [place] medical records  [id number]        Your Vitals Were     Pulse Height Pulse Oximetry BMI (Body Mass Index)          68 5' 10\" (1.778 m) 94% 24.26 kg/m2         Blood Pressure from Last 3 Encounters:   11/29/18 136/60   10/11/18 123/55   09/25/18 106/72    Weight from Last 3 Encounters:   11/29/18 169 lb 1.6 oz (76.7 kg)   09/25/18 163 lb (73.9 kg)   08/30/18 163 lb 14.4 oz (74.3 kg)              We Performed the Following     " Hemoglobin A1c          Today's Medication Changes          These changes are accurate as of 11/29/18  7:42 PM.  If you have any questions, ask your nurse or doctor.               Start taking these medicines.        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   Quantity:  1 kit   Refills:  0            Where to get your medicines      These medications were sent to [id number] - [place], [place] - [address] AT [facility] [address], [facility] [address]     Phone:  [phone]     blood glucose monitoring meter device kit                Primary Care Provider Office Phone # Fax #    Matt Morrissey [phone] [address]  [facility] [address]        Equal Access to Services     SIERRA JACOBSON AH: Hadii aad ku hadasho Soomaali, waaxda luqadaha, qaybta kaalmada adeegyada, waxay idiin hayaan adeeg kharakyle gama . So [facility] [phone].    ATENCIÓN: Si habla español, tiene a garvey disposición servicios gratuitos de asistencia lingüística. Llame al [phone].    We comply with applicable federal civil rights laws and [place] laws. We do not discriminate on the basis of race, color, national origin, age, disability, sex, sexual orientation, or gender identity.            Thank you!     Thank you for choosing [facility]  for your care. Our goal is always to provide you with excellent care. Hearing back from our patients is one way we can continue to improve our services. Please take a few minutes to complete the written survey that you may receive in the mail after your visit with us. Thank you!             Your Updated Medication List - Protect others around you: Learn how to safely use, store and throw away your  medicines at www.disposemymeds.org.          This list is accurate as of 11/29/18  7:42 PM.  Always use your most recent med list.                   Brand Name Dispense Instructions for use Diagnosis    AMITRIPTYLINE HCL PO      Take 25 mg by mouth nightly as needed for sleep        aspirin 81 MG EC tablet    ASA    30 tablet    Take 1 tablet (81 mg) by mouth daily    Transient cerebral ischemia, unspecified type       ATORVASTATIN CALCIUM PO      Take 40 mg by mouth At Bedtime        bisacodyl 10 MG suppository    DULCOLAX     Place 10 mg rectally daily as needed for constipation        blood glucose monitoring meter device kit    NO BRAND SPECIFIED    1 kit    Use to test blood sugar bid times daily or as directed.    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)       blood glucose monitoring test strip    TRUE METRIX BLOOD GLUCOSE TEST    300 strip    USE TO TEST BLOOD SUGAR THREE TIMES DAILY OR AS DIRECTED    Hypoglycemia       Cholecalciferol 16382 units Tabs      Take 1 tablet by mouth three times a week (Mon, Wed, Fri)        clopidogrel 75 MG tablet    PLAVIX    90 tablet    TAKE 1 TABLET BY MOUTH DAILY    Cough       DULoxetine 30 MG capsule    CYMBALTA    90 capsule    TAKE 1 CAPSULE(30 MG) BY MOUTH DAILY    Polyneuropathy associated with underlying disease (H)       ferrous gluconate 324 (38 Fe) MG tablet    FERGON    100 tablet    Take 1 tablet (324 mg) by mouth daily (with breakfast)    Iron deficiency anemia, unspecified iron deficiency anemia type       furosemide 20 MG tablet    LASIX    30 tablet    Take 1 tablet (20 mg) by mouth every other day Hold if systolic bp < 100 or patients appears dehydrated or having poor oral intake    Coronary artery disease involving native heart, angina presence unspecified, unspecified vessel or lesion type       insulin glargine 100 UNIT/ML pen     15 mL    Inject 14 Units Subcutaneous daily    Type 2 diabetes mellitus with hyperglycemia, unspecified long term  insulin use status       ipratropium 0.03 % nasal spray    ATROVENT    30 mL    INHALE 1 SPRAY IN EACH NOSTRIL EVERY 12 HOURS AS NEEDED    Runny nose       isosorbide mononitrate 30 MG 24 hr tablet    IMDUR    60 tablet    Take 2 tablets (60 mg) by mouth daily    Coronary artery disease involving native heart, angina presence unspecified, unspecified vessel or lesion type       lisinopril 5 MG tablet    PRINIVIL/ZESTRIL    30 tablet    Take 1 tablet (5 mg) by mouth daily    Coronary artery disease involving native heart, angina presence unspecified, unspecified vessel or lesion type       MAGNESIUM-OXIDE 400 (241.3 Mg) MG tablet   Generic drug:  magnesium oxide     180 tablet    TAKE 1 TABLET BY MOUTH TWICE DAILY    Constipation, unspecified constipation type       metFORMIN 1000 MG tablet    GLUCOPHAGE    180 tablet    TAKE 1 TABLET BY MOUTH TWICE DAILY WITH MEALS    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 If symptoms persist 5 minutes after 1st dose call 911.    Coronary artery disease involving native heart, angina presence unspecified, unspecified vessel or lesion type       * OMEPRAZOLE PO      Take 40 mg by mouth 2 times daily        * omeprazole 40 MG DR capsule    priLOSEC    90 capsule    TAKE 1 CAPSULE(40 MG) BY MOUTH DAILY 30 TO 60 MINUTES BEFORE A MEAL    Other acute gastritis without hemorrhage       order for DME     1 Act    Equipment being ordered: True Matrix Blood Glucose meter.    Type 2 diabetes mellitus with complication, with long-term current use of insulin (H)       polyethylene glycol packet    MIRALAX/GLYCOLAX     Take 17 g by mouth daily as needed for constipation        tamsulosin 0.4 MG capsule    FLOMAX    90 capsule    TAKE ONE CAPSULE BY MOUTH DAILY    Benign  non-nodular prostatic hyperplasia with lower urinary tract symptoms       * Notice:  This list has 2 medication(s) that are the same as other medications prescribed for you. Read the directions carefully, and ask your doctor or other care provider to review them with you.

## 2018-11-30 ASSESSMENT — PATIENT HEALTH QUESTIONNAIRE - PHQ9: SUM OF ALL RESPONSES TO PHQ QUESTIONS 1-9: 4

## 2018-12-01 NOTE — PROGRESS NOTES
The following letter pertains to your most recent diagnostic tests:    As we discussed in clinic your A1c is improved and close to your goal of A1c less than 8.  We should recheck in 3 months.        Sincerely,    Dr. Morrissey

## 2018-12-18 NOTE — PROGRESS NOTES
[facility]  Neurology Daily Note      Admission Date:12/31/2017   Date of service: 01/03/2018   Hospital Day: 4      Assessment & Plan   _______________________________  #. (G45.1) TIA involving right internal carotid artery  (primary encounter diagnosis)  --complete resolution of symptoms  --not tPA candidate due to being outside of the treatment window on admission  --not IR candidate due to lack of large vessel occlusion  ----MRI negative for acute abnormality  --RISK FACTORS: carotid stenosis, HTN, DM II  --on aspirin 81 mg and plavix 75 mg PO daily  ------held for surgery  --lipids normal on atorvastatin  #. (I65.21) Carotid stenosis, symptomatic w/o infarct, right  --consulted vascular surgery/IR for evaluation for stenting vs CEA  --carotid US with 50-69% stenosis   -----concern for ulceration, in further discussion with vascular surgery, they discussed CEA with patient  ------plan for CEA today  #. (I10) Essential hypertension  --management per primary service  #. (E11.69) Type 2 diabetes mellitus with other specified complication, without long-term current use of insulin (H)  --A1c 8.6  --management per primary service  #. PT/OT/Speech  --continue evaluations  #. Nutrition  --Per speech therapy evaluation   #. DVT Prophylaxis  --Mechanical    Code Status: Full Code    Disposition: pending    Interval History   _______________________________  Patient presented for left sided weakness and neglect. CT with chronic left frontal stroke, no acute abnormality, CTA with chronic left ICA occlusion and moderate right ICA stenosis. MRI without acute abnormality. Patient essentially returned to his baseline.  Still without recurrent symptoms. Vascular surgery reviewed and is planning for CEA today.    Review of Systems   _______________________________  The Review of Systems is negative other than noted in the HPI  Physical Exam   _______________________________  Vitals: Temp: 97.6  F (36.4  C) Temp  src: Oral BP: 139/67 Pulse: 66 Heart Rate: 70 Resp: 16 SpO2: 95 % O2 Device: None (Room air)    Vital Signs with Ranges: Temp:  [97.6  F (36.4  C)-98.3  F (36.8  C)] 97.6  F (36.4  C)  Pulse:  [66-81] 66  Heart Rate:  [67-70] 70  Resp:  [14-16] 16  BP: ()/(44-67) 139/67  SpO2:  [94 %-97 %] 95 %    General Appearance:  No acute distress  Neuro:       Mental Status Exam:   Awake, alert, oriented X3. Speech and language are intact. Mental status is normal       Cranial Nerves:  Pupils 3 mm, reactive. EOMI. Face sensation is normal. Face is symmetric. Tongue and uvula are midline. Other CN are normal           Motor:  5/5 X 4. Tone and bulk are normal           Reflexes:  Normal DTR. Toes downgoing.        Sensory:  Normal to light touch               Coordination:   Intact finger-to-nose        Gait:  No significant difficulties  Cardiovascular: Regular rate and rhythm, no m/r/g  Lungs: Clear to auscultation  Abdomen: Soft, not tender, not distended  Extremities: No clubbing, no cyanosis, no edema    Medications   _______________________________    - MEDICATION INSTRUCTIONS -       - MEDICATION INSTRUCTIONS -         omeprazole  40 mg Oral QAM AC     insulin glargine  8 Units Subcutaneous Daily     sodium chloride (PF)  10 mL Intravenous Once     ipratropium  2 spray Both Nostrils Q12H     sodium chloride (PF)  3 mL Intracatheter Q8H     aspirin EC  325 mg Oral Daily    Or     aspirin  300 mg Rectal Daily     insulin aspart  1-7 Units Subcutaneous TID AC     insulin aspart  1-5 Units Subcutaneous At Bedtime     atorvastatin (LIPITOR) tablet 80 mg  80 mg Oral Daily     DULoxetine  30 mg Oral Daily     metoprolol  12.5 mg Oral Daily     tamsulosin  0.4 mg Oral Daily       Data   _______________________________      Lab Data:   All data was reviewed by me personally  CBC RESULTS:  Recent Labs   Lab Test  01/01/18   0815  12/31/17   1130  09/28/17   1649   06/09/17   0535   WBC  5.3  9.6   --    --   7.9   RBC  3.93*   4.43   --    --   3.53*   HGB  12.1*  13.7  12.7*   < >  10.8*   HCT  36.5*  41.8   --    --   32.8*   PLT  126*  160   --    --   214    < > = values in this interval not displayed.     Basic Metabolic Panel:  Recent Labs   Lab Test  01/01/18   0815  12/31/17   1425  12/31/17   1130  12/01/17   0928   NA  137   --   137  139   POTASSIUM  4.3  4.4  5.4*  5.9*   CHLORIDE  105   --   102  104   CO2  26   --   28  30   BUN  13   --   19  17   CR  0.66   --   0.73  0.83   GLC  156*  193*  222*  256*   ALLISON  8.2*   --   9.5  10.6*     Liver panel:  Recent Labs   Lab Test  12/27/16   0650  12/25/16   0002  10/20/16   1825   PROTTOTAL  6.5*  7.1  7.3   ALBUMIN  2.8*  3.7  3.7   BILITOTAL  0.7  0.6  0.4   ALKPHOS  90  58  68   AST  43  19  17   ALT  50  21  26     Coagulation  Recent Labs   Lab Test  12/31/17   1130  01/20/17   1030   INR  0.99  1.03   PTT  32  29      Lipid Profile:  Recent Labs   Lab Test  12/31/17   1425 12/01/17   0928  12/18/15 12/05/14   CHOL  133  142   < >  198  160   HDL  43  66   < >  39*  48   LDL  61  39   < >  123  82   TRIG  147  186*   < >  181*  149   CHOLHDLRATIO   --    --    --   5.1*  3.3    < > = values in this interval not displayed.     Thyroid Panel:  Recent Labs   Lab Test  12/31/17   1425  01/20/17   1030  10/20/16   1825   TSH  2.16  3.12  4.63*   T4   --    --   0.94      Vitamin B12:   Recent Labs   Lab Test  01/20/17   1030   B12  298      Vitamin D level:   Recent Labs   Lab Test  12/31/17   1425  01/20/17   1030   VITDT  13*  19*     A1C:   Recent Labs   Lab Test  12/31/17   1425  12/14/17   0821  12/01/17   0928  07/24/17   1608  04/21/17   1543   A1C  8.6*  8.9*  8.7*  6.7*  8.3*     Troponin I:   Recent Labs   Lab Test  12/31/17   2148  12/31/17   1425  01/20/17   2235  01/20/17   1030  12/25/16   0002   TROPI  0.033  0.024  0.030  0.018  0.022     CRP inflammation:   Recent Labs   Lab Test  12/31/17   1425  06/09/17   0535  06/08/17   0613  06/07/17   0640  05/31/17    1800   CRP  <2.9  89.0*  93.4*  107.0*  21.2*     ESR:   Recent Labs   Lab Test  05/31/17   1800   SED  43*       UA Results:  Recent Labs   Lab Test  01/20/17   1130   COLOR  Light Yellow   APPEARANCE  Clear   URINEGLC  Negative   URINEBILI  Negative   URINEKETONE  Negative   SG  1.040*   UBLD  Negative   URINEPH  6.5   PROTEIN  Negative   NITRITE  Negative   LEUKEST  Negative   RBCU  0   WBCU  1        Cardiac US:   The left ventricle is normal in size. There is mild to moderate concentric left ventricular hypertrophy. Left ventricular systolic function is moderately reduced. The visual ejection fraction is estimated at 35-40%. Grade I or early diastolic dysfunction. There is severe hypokinesis to akinesis of the entire inferior and mid to basal inferolateral walls. There is no thrombus seen in the left ventricle. The right ventricle is normal size. The right ventricular systolic function is normal. Trace to mild mitral and tricuspid regurgitation. There is moderate trileaflet aortic sclerosis. No aortic regurgitation is present. Transaortic gradients are mildly increased consistent with aortic sclerosis. The peak AoV pressure gradient is 15.2 mmHg. The mean AoV pressure gradient is 7.9 mmHg. Mild aortic root dilatation. No pericardial effusion. In comparison to the previous report dated 01/21/2017, the findings are similar.       Neurophysiology:   --       Imaging:   All imaging studies were reviewed personally  CT head 12/31/17:   --Moderate-sized chronic left frontal infarct again noted without change. Diffuse cerebral volume loss and cerebral white matter changes consistent with chronic small vessel ischemic disease. No evidence for acute intracranial pathology.     CTA head/neck 12/31/17:   1. Chronic occlusion of the left internal carotid artery from the origin to the terminus again noted without change.  2. Moderate atherosclerotic narrowing of the distal P2 segment of the left posterior cerebral artery  again noted without change.  3. Atherosclerotic plaque at the origin of the right internal carotid artery resulting in less than 50% luminal diameter stenosis again noted.  4. No evidence for intracranial cerebral artery occlusion to explain the patient's recent symptoms.  5. Overall, no change from the comparison study.    CTP head 12/31/17:  --There is a large chronic infarct at the anterolateral aspect of the left frontal lobe. There are no other perfusion defects.    MRI brain 1/1/18:   --Diffuse cerebral volume loss and cerebral white matter changes consistent with chronic small vessel ischemic disease. 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 No evidence for acute intracranial pathology. No change from the comparison study.    Carotid US 1/2/18:  --50-69% diameter stenosis of the right ICA relative to the distal ICA diameter.      Text Page    Anna Velasquez, MSN, FNP-BC, RN CNRN     Pt admitted due to cough

## 2019-01-01 ENCOUNTER — APPOINTMENT (OUTPATIENT)
Dept: CT IMAGING | Facility: CLINIC | Age: 84
DRG: 101 | End: 2019-01-01
Attending: EMERGENCY MEDICINE
Payer: MEDICARE

## 2019-01-01 ENCOUNTER — APPOINTMENT (OUTPATIENT)
Dept: PHYSICAL THERAPY | Facility: CLINIC | Age: 84
DRG: 101 | End: 2019-01-01
Payer: MEDICARE

## 2019-01-01 ENCOUNTER — OFFICE VISIT (OUTPATIENT)
Dept: OTHER | Facility: CLINIC | Age: 84
End: 2019-01-01
Attending: SURGERY
Payer: MEDICARE

## 2019-01-01 ENCOUNTER — PATIENT OUTREACH (OUTPATIENT)
Dept: CARE COORDINATION | Facility: CLINIC | Age: 84
End: 2019-01-01

## 2019-01-01 ENCOUNTER — OFFICE VISIT (OUTPATIENT)
Dept: FAMILY MEDICINE | Facility: CLINIC | Age: 84
End: 2019-01-01
Payer: MEDICARE

## 2019-01-01 ENCOUNTER — APPOINTMENT (OUTPATIENT)
Dept: SPEECH THERAPY | Facility: CLINIC | Age: 84
DRG: 101 | End: 2019-01-01
Attending: HOSPITALIST
Payer: MEDICARE

## 2019-01-01 ENCOUNTER — HOSPITAL ENCOUNTER (INPATIENT)
Facility: CLINIC | Age: 84
LOS: 4 days | Discharge: NURSING, REHAB OR LONG-TERM CARE | DRG: 101 | End: 2019-08-29
Attending: EMERGENCY MEDICINE | Admitting: HOSPITALIST
Payer: MEDICARE

## 2019-01-01 ENCOUNTER — HOSPITAL ENCOUNTER (EMERGENCY)
Facility: CLINIC | Age: 84
Discharge: HOME | End: 2019-12-31
Attending: EMERGENCY MEDICINE | Admitting: EMERGENCY MEDICINE
Payer: MEDICARE

## 2019-01-01 ENCOUNTER — APPOINTMENT (OUTPATIENT)
Dept: OCCUPATIONAL THERAPY | Facility: CLINIC | Age: 84
DRG: 101 | End: 2019-01-01
Payer: MEDICARE

## 2019-01-01 ENCOUNTER — TELEPHONE (OUTPATIENT)
Dept: FAMILY MEDICINE | Facility: CLINIC | Age: 84
End: 2019-01-01

## 2019-01-01 ENCOUNTER — CARE COORDINATION (OUTPATIENT)
Dept: FAMILY MEDICINE | Facility: CLINIC | Age: 84
End: 2019-01-01

## 2019-01-01 ENCOUNTER — NURSING HOME VISIT (OUTPATIENT)
Dept: GERIATRICS | Facility: CLINIC | Age: 84
End: 2019-01-01
Payer: MEDICARE

## 2019-01-01 ENCOUNTER — HOSPITAL ENCOUNTER (OUTPATIENT)
Dept: ULTRASOUND IMAGING | Facility: CLINIC | Age: 84
End: 2019-11-19
Attending: SURGERY
Payer: MEDICARE

## 2019-01-01 ENCOUNTER — APPOINTMENT (OUTPATIENT)
Dept: PHYSICAL THERAPY | Facility: CLINIC | Age: 84
DRG: 101 | End: 2019-01-01
Attending: HOSPITALIST
Payer: MEDICARE

## 2019-01-01 ENCOUNTER — APPOINTMENT (OUTPATIENT)
Dept: OCCUPATIONAL THERAPY | Facility: CLINIC | Age: 84
DRG: 101 | End: 2019-01-01
Attending: PHYSICIAN ASSISTANT
Payer: MEDICARE

## 2019-01-01 ENCOUNTER — HOSPITAL ENCOUNTER (OUTPATIENT)
Dept: ULTRASOUND IMAGING | Facility: CLINIC | Age: 84
Discharge: HOME | End: 2019-11-19
Attending: SURGERY | Admitting: SURGERY
Payer: MEDICARE

## 2019-01-01 ENCOUNTER — APPOINTMENT (OUTPATIENT)
Dept: CT IMAGING | Facility: CLINIC | Age: 84
End: 2019-01-01
Attending: EMERGENCY MEDICINE
Payer: MEDICARE

## 2019-01-01 ENCOUNTER — DISCHARGE SUMMARY NURSING HOME (OUTPATIENT)
Dept: GERIATRICS | Facility: CLINIC | Age: 84
End: 2019-01-01
Payer: MEDICARE

## 2019-01-01 ENCOUNTER — OFFICE VISIT (OUTPATIENT)
Dept: CARDIOLOGY | Facility: CLINIC | Age: 84
End: 2019-01-01
Attending: NURSE PRACTITIONER
Payer: MEDICARE

## 2019-01-01 VITALS
BODY MASS INDEX: 25.55 KG/M2 | DIASTOLIC BLOOD PRESSURE: 56 MMHG | SYSTOLIC BLOOD PRESSURE: 103 MMHG | OXYGEN SATURATION: 97 % | TEMPERATURE: 97.1 F | HEIGHT: 66 IN | WEIGHT: 159 LBS | HEART RATE: 69 BPM

## 2019-01-01 VITALS
OXYGEN SATURATION: 94 % | RESPIRATION RATE: 16 BRPM | TEMPERATURE: 98.1 F | BODY MASS INDEX: 27 KG/M2 | HEART RATE: 55 BPM | DIASTOLIC BLOOD PRESSURE: 63 MMHG | WEIGHT: 168 LBS | HEIGHT: 66 IN | SYSTOLIC BLOOD PRESSURE: 127 MMHG

## 2019-01-01 VITALS
HEART RATE: 65 BPM | TEMPERATURE: 97.3 F | RESPIRATION RATE: 16 BRPM | SYSTOLIC BLOOD PRESSURE: 111 MMHG | WEIGHT: 157.2 LBS | OXYGEN SATURATION: 97 % | HEIGHT: 66 IN | BODY MASS INDEX: 25.26 KG/M2 | DIASTOLIC BLOOD PRESSURE: 68 MMHG

## 2019-01-01 VITALS
WEIGHT: 165.6 LBS | TEMPERATURE: 96.2 F | OXYGEN SATURATION: 93 % | HEIGHT: 66 IN | HEART RATE: 74 BPM | RESPIRATION RATE: 18 BRPM | DIASTOLIC BLOOD PRESSURE: 59 MMHG | BODY MASS INDEX: 26.61 KG/M2 | SYSTOLIC BLOOD PRESSURE: 120 MMHG

## 2019-01-01 VITALS
HEART RATE: 69 BPM | OXYGEN SATURATION: 97 % | WEIGHT: 155.6 LBS | BODY MASS INDEX: 25.01 KG/M2 | DIASTOLIC BLOOD PRESSURE: 62 MMHG | HEIGHT: 66 IN | SYSTOLIC BLOOD PRESSURE: 135 MMHG | RESPIRATION RATE: 16 BRPM | TEMPERATURE: 97.2 F

## 2019-01-01 VITALS
WEIGHT: 159.4 LBS | BODY MASS INDEX: 25.62 KG/M2 | RESPIRATION RATE: 16 BRPM | DIASTOLIC BLOOD PRESSURE: 74 MMHG | HEIGHT: 66 IN | TEMPERATURE: 97 F | SYSTOLIC BLOOD PRESSURE: 134 MMHG | HEART RATE: 66 BPM | OXYGEN SATURATION: 97 %

## 2019-01-01 VITALS
DIASTOLIC BLOOD PRESSURE: 53 MMHG | HEIGHT: 70 IN | BODY MASS INDEX: 23.88 KG/M2 | HEART RATE: 56 BPM | SYSTOLIC BLOOD PRESSURE: 109 MMHG | WEIGHT: 166.8 LBS

## 2019-01-01 VITALS
SYSTOLIC BLOOD PRESSURE: 112 MMHG | WEIGHT: 168 LBS | RESPIRATION RATE: 20 BRPM | TEMPERATURE: 97.8 F | DIASTOLIC BLOOD PRESSURE: 68 MMHG | OXYGEN SATURATION: 98 % | BODY MASS INDEX: 22.75 KG/M2 | HEIGHT: 72 IN | HEART RATE: 76 BPM

## 2019-01-01 VITALS — HEART RATE: 65 BPM | SYSTOLIC BLOOD PRESSURE: 113 MMHG | DIASTOLIC BLOOD PRESSURE: 66 MMHG

## 2019-01-01 DIAGNOSIS — R53.81 DEBILITY: ICD-10-CM

## 2019-01-01 DIAGNOSIS — G40.909 SEIZURE DISORDER (H): Primary | ICD-10-CM

## 2019-01-01 DIAGNOSIS — R05.9 COUGH: ICD-10-CM

## 2019-01-01 DIAGNOSIS — K59.00 CONSTIPATION, UNSPECIFIED CONSTIPATION TYPE: ICD-10-CM

## 2019-01-01 DIAGNOSIS — I73.9 PAD (PERIPHERAL ARTERY DISEASE) (H): ICD-10-CM

## 2019-01-01 DIAGNOSIS — N40.0 BENIGN PROSTATIC HYPERPLASIA WITHOUT LOWER URINARY TRACT SYMPTOMS: ICD-10-CM

## 2019-01-01 DIAGNOSIS — Z86.73 H/O TIA (TRANSIENT ISCHEMIC ATTACK) AND STROKE: ICD-10-CM

## 2019-01-01 DIAGNOSIS — I65.22 OCCLUSION OF LEFT INTERNAL CAROTID ARTERY: ICD-10-CM

## 2019-01-01 DIAGNOSIS — I10 ESSENTIAL HYPERTENSION: ICD-10-CM

## 2019-01-01 DIAGNOSIS — E11.8 TYPE 2 DIABETES MELLITUS WITH COMPLICATION, WITH LONG-TERM CURRENT USE OF INSULIN (H): ICD-10-CM

## 2019-01-01 DIAGNOSIS — S41.112A SKIN TEAR OF LEFT UPPER ARM WITHOUT COMPLICATION, INITIAL ENCOUNTER: ICD-10-CM

## 2019-01-01 DIAGNOSIS — I63.9 CEREBROVASCULAR ACCIDENT (H): ICD-10-CM

## 2019-01-01 DIAGNOSIS — Z79.4 TYPE 2 DIABETES MELLITUS WITH COMPLICATION, WITH LONG-TERM CURRENT USE OF INSULIN (H): ICD-10-CM

## 2019-01-01 DIAGNOSIS — G40.909 SEIZURE DISORDER (H): ICD-10-CM

## 2019-01-01 DIAGNOSIS — R41.89 SPELL OF ALTERED COGNITION: ICD-10-CM

## 2019-01-01 DIAGNOSIS — R47.1 DYSARTHRIA: ICD-10-CM

## 2019-01-01 DIAGNOSIS — R47.01 EXPRESSIVE APHASIA: Primary | ICD-10-CM

## 2019-01-01 DIAGNOSIS — I42.9 CARDIOMYOPATHY, UNSPECIFIED TYPE (H): ICD-10-CM

## 2019-01-01 DIAGNOSIS — E78.5 DYSLIPIDEMIA: ICD-10-CM

## 2019-01-01 DIAGNOSIS — R47.81 SLURRED SPEECH: ICD-10-CM

## 2019-01-01 DIAGNOSIS — I65.29 CAROTID STENOSIS: ICD-10-CM

## 2019-01-01 DIAGNOSIS — I63.9 STROKE OF UNKNOWN ETIOLOGY (H): Primary | ICD-10-CM

## 2019-01-01 DIAGNOSIS — E16.2 HYPOGLYCEMIA: ICD-10-CM

## 2019-01-01 DIAGNOSIS — I50.42 CHRONIC COMBINED SYSTOLIC AND DIASTOLIC CONGESTIVE HEART FAILURE (H): ICD-10-CM

## 2019-01-01 DIAGNOSIS — R41.89 COGNITIVE IMPAIRMENT: ICD-10-CM

## 2019-01-01 DIAGNOSIS — R47.89 SPELL OF CHANGE IN SPEECH: ICD-10-CM

## 2019-01-01 DIAGNOSIS — G45.9 TIA (TRANSIENT ISCHEMIC ATTACK): ICD-10-CM

## 2019-01-01 DIAGNOSIS — I25.10 CORONARY ARTERY DISEASE INVOLVING NATIVE HEART WITHOUT ANGINA PECTORIS, UNSPECIFIED VESSEL OR LESION TYPE: ICD-10-CM

## 2019-01-01 DIAGNOSIS — R41.89 SPELL OF ALTERED COGNITION: Primary | ICD-10-CM

## 2019-01-01 DIAGNOSIS — E11.42 DM TYPE 2 WITH DIABETIC PERIPHERAL NEUROPATHY (H): ICD-10-CM

## 2019-01-01 DIAGNOSIS — Z98.890 HISTORY OF CEA (CAROTID ENDARTERECTOMY): ICD-10-CM

## 2019-01-01 DIAGNOSIS — G45.9 TIA (TRANSIENT ISCHEMIC ATTACK): Primary | ICD-10-CM

## 2019-01-01 DIAGNOSIS — R47.89 SPELL OF CHANGE IN SPEECH: Primary | ICD-10-CM

## 2019-01-01 DIAGNOSIS — R53.1 GENERALIZED WEAKNESS: ICD-10-CM

## 2019-01-01 DIAGNOSIS — G47.00 INSOMNIA, UNSPECIFIED TYPE: ICD-10-CM

## 2019-01-01 DIAGNOSIS — I25.5 ISCHEMIC CARDIOMYOPATHY: ICD-10-CM

## 2019-01-01 DIAGNOSIS — R53.81 PHYSICAL DECONDITIONING: ICD-10-CM

## 2019-01-01 DIAGNOSIS — K21.9 GASTROESOPHAGEAL REFLUX DISEASE WITHOUT ESOPHAGITIS: ICD-10-CM

## 2019-01-01 DIAGNOSIS — R27.0 ATAXIA: ICD-10-CM

## 2019-01-01 DIAGNOSIS — I25.10 CORONARY ARTERY DISEASE INVOLVING NATIVE HEART WITHOUT ANGINA PECTORIS, UNSPECIFIED VESSEL OR LESION TYPE: Primary | ICD-10-CM

## 2019-01-01 DIAGNOSIS — I71.40 ABDOMINAL AORTIC ANEURYSM (AAA) WITHOUT RUPTURE (H): ICD-10-CM

## 2019-01-01 DIAGNOSIS — Z98.890 HISTORY OF RIGHT-SIDED CAROTID ENDARTERECTOMY: ICD-10-CM

## 2019-01-01 DIAGNOSIS — I25.118 CORONARY ARTERY DISEASE OF NATIVE ARTERY OF NATIVE HEART WITH STABLE ANGINA PECTORIS (H): ICD-10-CM

## 2019-01-01 DIAGNOSIS — G63 POLYNEUROPATHY ASSOCIATED WITH UNDERLYING DISEASE (H): ICD-10-CM

## 2019-01-01 DIAGNOSIS — I65.21 CAROTID STENOSIS, ASYMPTOMATIC, RIGHT: Primary | ICD-10-CM

## 2019-01-01 DIAGNOSIS — R47.89 SPELLS OF SPEECH ARREST: Primary | ICD-10-CM

## 2019-01-01 DIAGNOSIS — N40.1 BENIGN NON-NODULAR PROSTATIC HYPERPLASIA WITH LOWER URINARY TRACT SYMPTOMS: ICD-10-CM

## 2019-01-01 DIAGNOSIS — R29.810 FACIAL DROOP: ICD-10-CM

## 2019-01-01 DIAGNOSIS — E11.42 DM TYPE 2 WITH DIABETIC PERIPHERAL NEUROPATHY (H): Primary | ICD-10-CM

## 2019-01-01 DIAGNOSIS — Z98.890 S/P CAROTID ENDARTERECTOMY: ICD-10-CM

## 2019-01-01 DIAGNOSIS — R47.89 EPISODE OF CHANGE IN SPEECH: ICD-10-CM
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
## 2019-01-01 DIAGNOSIS — I48.0 PAROXYSMAL ATRIAL FIBRILLATION (H): ICD-10-CM

## 2019-01-01 LAB
ALBUMIN SERPL-MCNC: 3 G/DL (ref 3.4–5)
ALBUMIN UR-MCNC: NEGATIVE MG/DL
ALP SERPL-CCNC: 68 U/L (ref 40–150)
ALT SERPL W P-5'-P-CCNC: 13 U/L (ref 0–70)
ANION GAP SERPL CALCULATED.3IONS-SCNC: 2 MMOL/L (ref 3–14)
ANION GAP SERPL CALCULATED.3IONS-SCNC: 4 MMOL/L (ref 3–14)
ANION GAP SERPL CALCULATED.3IONS-SCNC: 5 MMOL/L (ref 3–14)
ANION GAP SERPL CALCULATED.3IONS-SCNC: 5 MMOL/L (ref 3–14)
ANION GAP SERPL CALCULATED.3IONS-SCNC: 6 MMOL/L (ref 3–14)
ANION GAP SERPL CALCULATED.3IONS-SCNC: 7 MMOL/L (ref 3–14)
APPEARANCE UR: CLEAR
APTT PPP: 31 SEC (ref 22–37)
AST SERPL W P-5'-P-CCNC: 15 U/L (ref 0–45)
BASOPHILS # BLD AUTO: 0 10E9/L (ref 0–0.2)
BASOPHILS # BLD AUTO: 0 10E9/L (ref 0–0.2)
BASOPHILS NFR BLD AUTO: 0.3 %
BASOPHILS NFR BLD AUTO: 0.3 %
BILIRUB DIRECT SERPL-MCNC: <0.1 MG/DL (ref 0–0.2)
BILIRUB SERPL-MCNC: 0.4 MG/DL (ref 0.2–1.3)
BILIRUB UR QL STRIP: NEGATIVE
BUN SERPL-MCNC: 13 MG/DL (ref 7–30)
BUN SERPL-MCNC: 15 MG/DL (ref 7–30)
BUN SERPL-MCNC: 16 MG/DL (ref 7–30)
BUN SERPL-MCNC: 17 MG/DL (ref 7–30)
BUN SERPL-MCNC: 19 MG/DL (ref 7–30)
BUN SERPL-MCNC: 19 MG/DL (ref 7–30)
CALCIUM SERPL-MCNC: 9 MG/DL (ref 8.5–10.1)
CALCIUM SERPL-MCNC: 9.2 MG/DL (ref 8.5–10.1)
CALCIUM SERPL-MCNC: 9.4 MG/DL (ref 8.5–10.1)
CALCIUM SERPL-MCNC: 9.4 MG/DL (ref 8.5–10.1)
CALCIUM SERPL-MCNC: 9.7 MG/DL (ref 8.5–10.1)
CALCIUM SERPL-MCNC: 9.8 MG/DL (ref 8.5–10.1)
CHLORIDE SERPL-SCNC: 105 MMOL/L (ref 94–109)
CHLORIDE SERPL-SCNC: 105 MMOL/L (ref 94–109)
CHLORIDE SERPL-SCNC: 107 MMOL/L (ref 94–109)
CHLORIDE SERPL-SCNC: 107 MMOL/L (ref 94–109)
CHLORIDE SERPL-SCNC: 108 MMOL/L (ref 94–109)
CHLORIDE SERPL-SCNC: 108 MMOL/L (ref 94–109)
CHOLEST SERPL-MCNC: 118 MG/DL
CO2 SERPL-SCNC: 26 MMOL/L (ref 20–32)
CO2 SERPL-SCNC: 27 MMOL/L (ref 20–32)
CO2 SERPL-SCNC: 28 MMOL/L (ref 20–32)
CO2 SERPL-SCNC: 31 MMOL/L (ref 20–32)
CO2 SERPL-SCNC: 31 MMOL/L (ref 20–32)
CO2 SERPL-SCNC: 32 MMOL/L (ref 20–32)
COLOR UR AUTO: YELLOW
CREAT SERPL-MCNC: 0.65 MG/DL (ref 0.66–1.25)
CREAT SERPL-MCNC: 0.72 MG/DL (ref 0.66–1.25)
CREAT SERPL-MCNC: 0.73 MG/DL (ref 0.66–1.25)
CREAT SERPL-MCNC: 0.74 MG/DL (ref 0.66–1.25)
CREAT SERPL-MCNC: 0.86 MG/DL (ref 0.66–1.25)
CREAT SERPL-MCNC: 0.93 MG/DL (ref 0.66–1.25)
DIFFERENTIAL METHOD BLD: ABNORMAL
DIFFERENTIAL METHOD BLD: ABNORMAL
EOSINOPHIL # BLD AUTO: 0.1 10E9/L (ref 0–0.7)
EOSINOPHIL # BLD AUTO: 0.2 10E9/L (ref 0–0.7)
EOSINOPHIL NFR BLD AUTO: 1.1 %
EOSINOPHIL NFR BLD AUTO: 2.2 %
ERYTHROCYTE [DISTWIDTH] IN BLOOD BY AUTOMATED COUNT: 13.9 % (ref 10–15)
ERYTHROCYTE [DISTWIDTH] IN BLOOD BY AUTOMATED COUNT: 13.9 % (ref 10–15)
ERYTHROCYTE [DISTWIDTH] IN BLOOD BY AUTOMATED COUNT: 14.1 % (ref 10–15)
ERYTHROCYTE [DISTWIDTH] IN BLOOD BY AUTOMATED COUNT: 14.2 % (ref 10–15)
GFR SERPL CREATININE-BSD FRML MDRD: 71 ML/MIN/{1.73_M2}
GFR SERPL CREATININE-BSD FRML MDRD: 75 ML/MIN/{1.73_M2}
GFR SERPL CREATININE-BSD FRML MDRD: 80 ML/MIN/{1.73_M2}
GFR SERPL CREATININE-BSD FRML MDRD: 81 ML/MIN/{1.73_M2}
GFR SERPL CREATININE-BSD FRML MDRD: 81 ML/MIN/{1.73_M2}
GFR SERPL CREATININE-BSD FRML MDRD: 85 ML/MIN/{1.73_M2}
GLUCOSE BLDC GLUCOMTR-MCNC: 104 MG/DL (ref 70–99)
GLUCOSE BLDC GLUCOMTR-MCNC: 108 MG/DL (ref 70–99)
GLUCOSE BLDC GLUCOMTR-MCNC: 134 MG/DL (ref 70–99)
GLUCOSE BLDC GLUCOMTR-MCNC: 146 MG/DL (ref 70–99)
GLUCOSE BLDC GLUCOMTR-MCNC: 148 MG/DL (ref 70–99)
GLUCOSE BLDC GLUCOMTR-MCNC: 149 MG/DL (ref 70–99)
GLUCOSE BLDC GLUCOMTR-MCNC: 153 MG/DL (ref 70–99)
GLUCOSE BLDC GLUCOMTR-MCNC: 157 MG/DL (ref 70–99)
GLUCOSE BLDC GLUCOMTR-MCNC: 158 MG/DL (ref 70–99)
GLUCOSE BLDC GLUCOMTR-MCNC: 161 MG/DL (ref 70–99)
GLUCOSE BLDC GLUCOMTR-MCNC: 174 MG/DL (ref 70–99)
GLUCOSE BLDC GLUCOMTR-MCNC: 176 MG/DL (ref 70–99)
GLUCOSE BLDC GLUCOMTR-MCNC: 177 MG/DL (ref 70–99)
GLUCOSE BLDC GLUCOMTR-MCNC: 185 MG/DL (ref 70–99)
GLUCOSE BLDC GLUCOMTR-MCNC: 189 MG/DL (ref 70–99)
GLUCOSE BLDC GLUCOMTR-MCNC: 192 MG/DL (ref 70–99)
GLUCOSE BLDC GLUCOMTR-MCNC: 197 MG/DL (ref 70–99)
GLUCOSE BLDC GLUCOMTR-MCNC: 197 MG/DL (ref 70–99)
GLUCOSE BLDC GLUCOMTR-MCNC: 205 MG/DL (ref 70–99)
GLUCOSE BLDC GLUCOMTR-MCNC: 214 MG/DL (ref 70–99)
GLUCOSE BLDC GLUCOMTR-MCNC: 222 MG/DL (ref 70–99)
GLUCOSE BLDC GLUCOMTR-MCNC: 267 MG/DL (ref 70–99)
GLUCOSE SERPL-MCNC: 103 MG/DL (ref 70–99)
GLUCOSE SERPL-MCNC: 112 MG/DL (ref 70–99)
GLUCOSE SERPL-MCNC: 133 MG/DL (ref 70–99)
GLUCOSE SERPL-MCNC: 150 MG/DL (ref 70–99)
GLUCOSE SERPL-MCNC: 174 MG/DL (ref 70–99)
GLUCOSE SERPL-MCNC: 337 MG/DL (ref 70–99)
GLUCOSE UR STRIP-MCNC: NEGATIVE MG/DL
HBA1C MFR BLD: 7.6 % (ref 0–5.6)
HCT VFR BLD AUTO: 34.4 % (ref 40–53)
HCT VFR BLD AUTO: 37.5 % (ref 40–53)
HCT VFR BLD AUTO: 37.8 % (ref 40–53)
HCT VFR BLD AUTO: 40 % (ref 40–53)
HDLC SERPL-MCNC: 43 MG/DL
HGB BLD-MCNC: 11 G/DL (ref 13.3–17.7)
HGB BLD-MCNC: 11.6 G/DL (ref 13.3–17.7)
HGB BLD-MCNC: 12.1 G/DL (ref 13.3–17.7)
HGB BLD-MCNC: 12.5 G/DL (ref 13.3–17.7)
HGB UR QL STRIP: ABNORMAL
IMM GRANULOCYTES # BLD: 0 10E9/L (ref 0–0.4)
IMM GRANULOCYTES # BLD: 0 10E9/L (ref 0–0.4)
IMM GRANULOCYTES NFR BLD: 0.1 %
IMM GRANULOCYTES NFR BLD: 0.2 %
INR PPP: 0.94 (ref 0.86–1.14)
INTERPRETATION ECG - MUSE: NORMAL
INTERPRETATION ECG - MUSE: NORMAL
KETONES UR STRIP-MCNC: 10 MG/DL
LDLC SERPL CALC-MCNC: 49 MG/DL
LEUKOCYTE ESTERASE UR QL STRIP: NEGATIVE
LYMPHOCYTES # BLD AUTO: 1.7 10E9/L (ref 0.8–5.3)
LYMPHOCYTES # BLD AUTO: 2.8 10E9/L (ref 0.8–5.3)
LYMPHOCYTES NFR BLD AUTO: 26.7 %
LYMPHOCYTES NFR BLD AUTO: 38.1 %
MAGNESIUM SERPL-MCNC: 1.4 MG/DL (ref 1.6–2.3)
MAGNESIUM SERPL-MCNC: 1.5 MG/DL (ref 1.6–2.3)
MAGNESIUM SERPL-MCNC: 2 MG/DL (ref 1.6–2.3)
MAGNESIUM SERPL-MCNC: 2.6 MG/DL (ref 1.6–2.3)
MCH RBC QN AUTO: 28.1 PG (ref 26.5–33)
MCH RBC QN AUTO: 28.2 PG (ref 26.5–33)
MCH RBC QN AUTO: 28.5 PG (ref 26.5–33)
MCH RBC QN AUTO: 28.7 PG (ref 26.5–33)
MCHC RBC AUTO-ENTMCNC: 30.9 G/DL (ref 31.5–36.5)
MCHC RBC AUTO-ENTMCNC: 31.3 G/DL (ref 31.5–36.5)
MCHC RBC AUTO-ENTMCNC: 32 G/DL (ref 31.5–36.5)
MCHC RBC AUTO-ENTMCNC: 32 G/DL (ref 31.5–36.5)
MCV RBC AUTO: 89 FL (ref 78–100)
MCV RBC AUTO: 90 FL (ref 78–100)
MCV RBC AUTO: 90 FL (ref 78–100)
MCV RBC AUTO: 91 FL (ref 78–100)
MONOCYTES # BLD AUTO: 0.6 10E9/L (ref 0–1.3)
MONOCYTES # BLD AUTO: 0.6 10E9/L (ref 0–1.3)
MONOCYTES NFR BLD AUTO: 8.5 %
MONOCYTES NFR BLD AUTO: 8.7 %
MUCOUS THREADS #/AREA URNS LPF: PRESENT /LPF
NEUTROPHILS # BLD AUTO: 3.6 10E9/L (ref 1.6–8.3)
NEUTROPHILS # BLD AUTO: 4.1 10E9/L (ref 1.6–8.3)
NEUTROPHILS NFR BLD AUTO: 50.6 %
NEUTROPHILS NFR BLD AUTO: 63.2 %
NITRATE UR QL: NEGATIVE
NONHDLC SERPL-MCNC: 75 MG/DL
NRBC # BLD AUTO: 0 10*3/UL
NRBC # BLD AUTO: 0 10*3/UL
NRBC BLD AUTO-RTO: 0 /100
NRBC BLD AUTO-RTO: 0 /100
PH UR STRIP: 7.5 PH (ref 5–7)
PHOSPHATE SERPL-MCNC: 3.3 MG/DL (ref 2.5–4.5)
PLATELET # BLD AUTO: 147 10E9/L (ref 150–450)
PLATELET # BLD AUTO: 167 10E9/L (ref 150–450)
PLATELET # BLD AUTO: 175 10E9/L (ref 150–450)
PLATELET # BLD AUTO: 177 10E9/L (ref 150–450)
PLATELET REACTIVITY P2Y12: 227 PRU
POTASSIUM SERPL-SCNC: 3.9 MMOL/L (ref 3.4–5.3)
POTASSIUM SERPL-SCNC: 4.2 MMOL/L (ref 3.4–5.3)
POTASSIUM SERPL-SCNC: 4.7 MMOL/L (ref 3.4–5.3)
POTASSIUM SERPL-SCNC: 4.7 MMOL/L (ref 3.4–5.3)
POTASSIUM SERPL-SCNC: 4.9 MMOL/L (ref 3.4–5.3)
PROT SERPL-MCNC: 6.1 G/DL (ref 6.8–8.8)
RBC # BLD AUTO: 3.86 10E12/L (ref 4.4–5.9)
RBC # BLD AUTO: 4.11 10E12/L (ref 4.4–5.9)
RBC # BLD AUTO: 4.21 10E12/L (ref 4.4–5.9)
RBC # BLD AUTO: 4.45 10E12/L (ref 4.4–5.9)
RBC #/AREA URNS AUTO: 9 /HPF (ref 0–2)
SODIUM SERPL-SCNC: 139 MMOL/L (ref 133–144)
SODIUM SERPL-SCNC: 141 MMOL/L (ref 133–144)
SOURCE: ABNORMAL
SP GR UR STRIP: 1.02 (ref 1–1.03)
SQUAMOUS #/AREA URNS AUTO: <1 /HPF (ref 0–1)
TRIGL SERPL-MCNC: 130 MG/DL
TROPONIN I SERPL-MCNC: 0.01 UG/L (ref 0–0.04)
TROPONIN I SERPL-MCNC: 0.02 UG/L (ref 0–0.04)
TROPONIN I SERPL-MCNC: <0.015 UG/L (ref 0–0.04)
TROPONIN I SERPL-MCNC: <0.015 UG/L (ref 0–0.04)
UROBILINOGEN UR STRIP-MCNC: NORMAL MG/DL (ref 0–2)
WBC # BLD AUTO: 5.8 10E9/L (ref 4–11)
WBC # BLD AUTO: 6.5 10E9/L (ref 4–11)
WBC # BLD AUTO: 6.7 10E9/L (ref 4–11)
WBC # BLD AUTO: 7.2 10E9/L (ref 4–11)
WBC #/AREA URNS AUTO: 1 /HPF (ref 0–5)

## 2019-01-01 PROCEDURE — 70450 CT HEAD/BRAIN W/O DYE: CPT

## 2019-01-01 PROCEDURE — 25000132 ZZH RX MED GY IP 250 OP 250 PS 637: Mod: GY | Performed by: PHYSICIAN ASSISTANT

## 2019-01-01 PROCEDURE — 99285 EMERGENCY DEPT VISIT HI MDM: CPT | Mod: 25

## 2019-01-01 PROCEDURE — 12000000 ZZH R&B MED SURG/OB

## 2019-01-01 PROCEDURE — 83735 ASSAY OF MAGNESIUM: CPT | Performed by: HOSPITALIST

## 2019-01-01 PROCEDURE — 97530 THERAPEUTIC ACTIVITIES: CPT | Mod: GP

## 2019-01-01 PROCEDURE — 25000131 ZZH RX MED GY IP 250 OP 636 PS 637: Mod: GY | Performed by: PHYSICIAN ASSISTANT

## 2019-01-01 PROCEDURE — 25000132 ZZH RX MED GY IP 250 OP 250 PS 637: Mod: GY | Performed by: HOSPITALIST

## 2019-01-01 PROCEDURE — 84100 ASSAY OF PHOSPHORUS: CPT | Performed by: NURSE PRACTITIONER

## 2019-01-01 PROCEDURE — 84484 ASSAY OF TROPONIN QUANT: CPT | Performed by: HOSPITALIST

## 2019-01-01 PROCEDURE — 85027 COMPLETE CBC AUTOMATED: CPT | Performed by: HOSPITALIST

## 2019-01-01 PROCEDURE — 81001 URINALYSIS AUTO W/SCOPE: CPT | Performed by: NURSE PRACTITIONER

## 2019-01-01 PROCEDURE — 99214 OFFICE O/P EST MOD 30 MIN: CPT | Mod: 25 | Performed by: INTERNAL MEDICINE

## 2019-01-01 PROCEDURE — 00000146 ZZHCL STATISTIC GLUCOSE BY METER IP

## 2019-01-01 PROCEDURE — 85025 COMPLETE CBC W/AUTO DIFF WBC: CPT | Performed by: EMERGENCY MEDICINE

## 2019-01-01 PROCEDURE — 36415 COLL VENOUS BLD VENIPUNCTURE: CPT | Performed by: NURSE PRACTITIONER

## 2019-01-01 PROCEDURE — 97116 GAIT TRAINING THERAPY: CPT | Mod: GP

## 2019-01-01 PROCEDURE — 99232 SBSQ HOSP IP/OBS MODERATE 35: CPT | Performed by: HOSPITALIST

## 2019-01-01 PROCEDURE — 95951 ZZHC EEG VIDEO EACH 24 HR: CPT

## 2019-01-01 PROCEDURE — 25000128 H RX IP 250 OP 636: Performed by: PHYSICIAN ASSISTANT

## 2019-01-01 PROCEDURE — 97535 SELF CARE MNGMENT TRAINING: CPT | Mod: GO | Performed by: OCCUPATIONAL THERAPIST

## 2019-01-01 PROCEDURE — 97535 SELF CARE MNGMENT TRAINING: CPT | Mod: GO | Performed by: OCCUPATIONAL THERAPY ASSISTANT

## 2019-01-01 PROCEDURE — 99305 1ST NF CARE MODERATE MDM 35: CPT | Performed by: INTERNAL MEDICINE

## 2019-01-01 PROCEDURE — 83735 ASSAY OF MAGNESIUM: CPT | Performed by: NURSE PRACTITIONER

## 2019-01-01 PROCEDURE — 36415 COLL VENOUS BLD VENIPUNCTURE: CPT | Performed by: HOSPITALIST

## 2019-01-01 PROCEDURE — 93005 ELECTROCARDIOGRAM TRACING: CPT

## 2019-01-01 PROCEDURE — 99207 ZZC CDG-MDM COMPONENT: MEETS LOW - DOWN CODED: CPT | Performed by: INTERNAL MEDICINE

## 2019-01-01 PROCEDURE — 97161 PT EVAL LOW COMPLEX 20 MIN: CPT | Mod: GP

## 2019-01-01 PROCEDURE — 90662 IIV NO PRSV INCREASED AG IM: CPT | Performed by: INTERNAL MEDICINE

## 2019-01-01 PROCEDURE — 99220 ZZC INITIAL OBSERVATION CARE,LEVL III: CPT | Performed by: HOSPITALIST

## 2019-01-01 PROCEDURE — 80076 HEPATIC FUNCTION PANEL: CPT | Performed by: HOSPITALIST

## 2019-01-01 PROCEDURE — 80048 BASIC METABOLIC PNL TOTAL CA: CPT | Performed by: HOSPITALIST

## 2019-01-01 PROCEDURE — 99207 ZZC CDG-MDM COMPONENT: MEETS MODERATE - UP CODED: CPT | Performed by: HOSPITALIST

## 2019-01-01 PROCEDURE — G0463 HOSPITAL OUTPT CLINIC VISIT: HCPCS | Mod: 25

## 2019-01-01 PROCEDURE — 25800030 ZZH RX IP 258 OP 636: Performed by: HOSPITALIST

## 2019-01-01 PROCEDURE — 97166 OT EVAL MOD COMPLEX 45 MIN: CPT | Mod: GO | Performed by: OCCUPATIONAL THERAPIST

## 2019-01-01 PROCEDURE — 85730 THROMBOPLASTIN TIME PARTIAL: CPT | Performed by: EMERGENCY MEDICINE

## 2019-01-01 PROCEDURE — G0378 HOSPITAL OBSERVATION PER HR: HCPCS

## 2019-01-01 PROCEDURE — 40000061 ZZH STATISTIC EEG TIME EA 10 MIN

## 2019-01-01 PROCEDURE — 85610 PROTHROMBIN TIME: CPT | Performed by: EMERGENCY MEDICINE

## 2019-01-01 PROCEDURE — 99232 SBSQ HOSP IP/OBS MODERATE 35: CPT | Performed by: PSYCHIATRY & NEUROLOGY

## 2019-01-01 PROCEDURE — 70460 CT HEAD/BRAIN W/DYE: CPT

## 2019-01-01 PROCEDURE — 84484 ASSAY OF TROPONIN QUANT: CPT | Performed by: PHYSICIAN ASSISTANT

## 2019-01-01 PROCEDURE — 92610 EVALUATE SWALLOWING FUNCTION: CPT | Mod: GN

## 2019-01-01 PROCEDURE — 99309 SBSQ NF CARE MODERATE MDM 30: CPT | Performed by: NURSE PRACTITIONER

## 2019-01-01 PROCEDURE — 85576 BLOOD PLATELET AGGREGATION: CPT | Performed by: PHYSICIAN ASSISTANT

## 2019-01-01 PROCEDURE — 70498 CT ANGIOGRAPHY NECK: CPT

## 2019-01-01 PROCEDURE — 93882 EXTRACRANIAL UNI/LTD STUDY: CPT | Mod: RT

## 2019-01-01 PROCEDURE — 99316 NF DSCHRG MGMT 30 MIN+: CPT | Performed by: NURSE PRACTITIONER

## 2019-01-01 PROCEDURE — 99310 SBSQ NF CARE HIGH MDM 45: CPT | Performed by: NURSE PRACTITIONER

## 2019-01-01 PROCEDURE — 84132 ASSAY OF SERUM POTASSIUM: CPT | Performed by: HOSPITALIST

## 2019-01-01 PROCEDURE — 40000275 ZZH STATISTIC RCP TIME EA 10 MIN

## 2019-01-01 PROCEDURE — 36415 COLL VENOUS BLD VENIPUNCTURE: CPT | Performed by: INTERNAL MEDICINE

## 2019-01-01 PROCEDURE — 36415 COLL VENOUS BLD VENIPUNCTURE: CPT | Performed by: PHYSICIAN ASSISTANT

## 2019-01-01 PROCEDURE — 99207 ZZC APP CREDIT; MD BILLING SHARED VISIT: CPT | Performed by: NURSE PRACTITIONER

## 2019-01-01 PROCEDURE — 83036 HEMOGLOBIN GLYCOSYLATED A1C: CPT | Performed by: INTERNAL MEDICINE

## 2019-01-01 PROCEDURE — 80048 BASIC METABOLIC PNL TOTAL CA: CPT | Performed by: NURSE PRACTITIONER

## 2019-01-01 PROCEDURE — 84484 ASSAY OF TROPONIN QUANT: CPT | Performed by: EMERGENCY MEDICINE

## 2019-01-01 PROCEDURE — 80048 BASIC METABOLIC PNL TOTAL CA: CPT | Performed by: EMERGENCY MEDICINE

## 2019-01-01 PROCEDURE — 25000131 ZZH RX MED GY IP 250 OP 636 PS 637: Mod: GY | Performed by: HOSPITALIST

## 2019-01-01 PROCEDURE — 25000128 H RX IP 250 OP 636: Performed by: EMERGENCY MEDICINE

## 2019-01-01 PROCEDURE — 96372 THER/PROPH/DIAG INJ SC/IM: CPT

## 2019-01-01 PROCEDURE — 25000128 H RX IP 250 OP 636: Performed by: NURSE PRACTITIONER

## 2019-01-01 PROCEDURE — 99239 HOSP IP/OBS DSCHRG MGMT >30: CPT | Performed by: HOSPITALIST

## 2019-01-01 PROCEDURE — 99214 OFFICE O/P EST MOD 30 MIN: CPT | Performed by: INTERNAL MEDICINE

## 2019-01-01 PROCEDURE — 99214 OFFICE O/P EST MOD 30 MIN: CPT | Mod: ZP | Performed by: SURGERY

## 2019-01-01 PROCEDURE — 80061 LIPID PANEL: CPT | Performed by: HOSPITALIST

## 2019-01-01 PROCEDURE — 99233 SBSQ HOSP IP/OBS HIGH 50: CPT | Performed by: PHYSICIAN ASSISTANT

## 2019-01-01 PROCEDURE — 99233 SBSQ HOSP IP/OBS HIGH 50: CPT | Performed by: HOSPITALIST

## 2019-01-01 PROCEDURE — 93979 VASCULAR STUDY: CPT | Mod: TC

## 2019-01-01 PROCEDURE — G0008 ADMIN INFLUENZA VIRUS VAC: HCPCS | Performed by: INTERNAL MEDICINE

## 2019-01-01 PROCEDURE — 99214 OFFICE O/P EST MOD 30 MIN: CPT | Performed by: PSYCHIATRY & NEUROLOGY

## 2019-01-01 RX ORDER — ACETAMINOPHEN 500 MG
500-1000 TABLET ORAL EVERY 6 HOURS PRN
Status: DISCONTINUED | OUTPATIENT
Start: 2019-01-01 | End: 2019-01-01 | Stop reason: HOSPADM

## 2019-01-01 RX ORDER — HYDRALAZINE HYDROCHLORIDE 20 MG/ML
10-20 INJECTION INTRAMUSCULAR; INTRAVENOUS
Status: DISCONTINUED | OUTPATIENT
Start: 2019-01-01 | End: 2019-01-01 | Stop reason: HOSPADM

## 2019-01-01 RX ORDER — IOPAMIDOL 755 MG/ML
120 INJECTION, SOLUTION INTRAVASCULAR ONCE
Status: DISCONTINUED | OUTPATIENT
Start: 2019-01-01 | End: 2019-01-01 | Stop reason: CLARIF

## 2019-01-01 RX ORDER — ACETAMINOPHEN 325 MG/1
650 TABLET ORAL EVERY 4 HOURS PRN
Status: DISCONTINUED | OUTPATIENT
Start: 2019-01-01 | End: 2019-01-01

## 2019-01-01 RX ORDER — MAGNESIUM SULFATE HEPTAHYDRATE 40 MG/ML
2 INJECTION, SOLUTION INTRAVENOUS DAILY PRN
Status: DISCONTINUED | OUTPATIENT
Start: 2019-01-01 | End: 2019-01-01 | Stop reason: HOSPADM

## 2019-01-01 RX ORDER — CLOPIDOGREL BISULFATE 75 MG/1
TABLET ORAL
Refills: 0
Start: 2019-01-01

## 2019-01-01 RX ORDER — DULOXETIN HYDROCHLORIDE 30 MG/1
30 CAPSULE, DELAYED RELEASE ORAL DAILY
Status: DISCONTINUED | OUTPATIENT
Start: 2019-01-01 | End: 2019-01-01 | Stop reason: HOSPADM

## 2019-01-01 RX ORDER — POTASSIUM CHLORIDE 29.8 MG/ML
20 INJECTION INTRAVENOUS
Status: DISCONTINUED | OUTPATIENT
Start: 2019-01-01 | End: 2019-01-01 | Stop reason: HOSPADM

## 2019-01-01 RX ORDER — ONDANSETRON 2 MG/ML
4 INJECTION INTRAMUSCULAR; INTRAVENOUS EVERY 6 HOURS PRN
Status: DISCONTINUED | OUTPATIENT
Start: 2019-01-01 | End: 2019-01-01 | Stop reason: HOSPADM

## 2019-01-01 RX ORDER — POTASSIUM CHLORIDE 7.45 MG/ML
10 INJECTION INTRAVENOUS
Status: DISCONTINUED | OUTPATIENT
Start: 2019-01-01 | End: 2019-01-01 | Stop reason: HOSPADM

## 2019-01-01 RX ORDER — POTASSIUM CHLORIDE 1500 MG/1
20-40 TABLET, EXTENDED RELEASE ORAL
Status: DISCONTINUED | OUTPATIENT
Start: 2019-01-01 | End: 2019-01-01 | Stop reason: HOSPADM

## 2019-01-01 RX ORDER — ASPIRIN 81 MG/1
81 TABLET ORAL DAILY
Status: DISCONTINUED | OUTPATIENT
Start: 2019-01-01 | End: 2019-01-01 | Stop reason: HOSPADM

## 2019-01-01 RX ORDER — ATORVASTATIN CALCIUM 40 MG/1
40 TABLET, FILM COATED ORAL DAILY
Status: DISCONTINUED | OUTPATIENT
Start: 2019-01-01 | End: 2019-01-01 | Stop reason: HOSPADM

## 2019-01-01 RX ORDER — POLYETHYLENE GLYCOL 3350 17 G/17G
17 POWDER, FOR SOLUTION ORAL DAILY PRN
Status: DISCONTINUED | OUTPATIENT
Start: 2019-01-01 | End: 2019-01-01 | Stop reason: HOSPADM

## 2019-01-01 RX ORDER — NITROGLYCERIN 0.4 MG/1
0.4 TABLET SUBLINGUAL EVERY 5 MIN PRN
Status: DISCONTINUED | OUTPATIENT
Start: 2019-01-01 | End: 2019-01-01 | Stop reason: HOSPADM

## 2019-01-01 RX ORDER — LABETALOL HYDROCHLORIDE 5 MG/ML
10-40 INJECTION, SOLUTION INTRAVENOUS EVERY 10 MIN PRN
Status: DISCONTINUED | OUTPATIENT
Start: 2019-01-01 | End: 2019-01-01 | Stop reason: HOSPADM

## 2019-01-01 RX ORDER — CLOPIDOGREL BISULFATE 75 MG/1
75 TABLET ORAL DAILY
Status: DISCONTINUED | OUTPATIENT
Start: 2019-01-01 | End: 2019-01-01

## 2019-01-01 RX ORDER — ACETAMINOPHEN 650 MG/1
650 SUPPOSITORY RECTAL EVERY 4 HOURS PRN
Status: DISCONTINUED | OUTPATIENT
Start: 2019-01-01 | End: 2019-01-01 | Stop reason: HOSPADM

## 2019-01-01 RX ORDER — POTASSIUM CL/LIDO/0.9 % NACL 10MEQ/0.1L
10 INTRAVENOUS SOLUTION, PIGGYBACK (ML) INTRAVENOUS
Status: DISCONTINUED | OUTPATIENT
Start: 2019-01-01 | End: 2019-01-01 | Stop reason: HOSPADM

## 2019-01-01 RX ORDER — INSULIN GLARGINE 100 [IU]/ML
26 INJECTION, SOLUTION SUBCUTANEOUS AT BEDTIME
Status: DISCONTINUED | OUTPATIENT
Start: 2019-01-01 | End: 2019-01-01

## 2019-01-01 RX ORDER — BISACODYL 10 MG
10 SUPPOSITORY, RECTAL RECTAL DAILY PRN
Status: DISCONTINUED | OUTPATIENT
Start: 2019-01-01 | End: 2019-01-01 | Stop reason: HOSPADM

## 2019-01-01 RX ORDER — CALCIUM CITRATE/VITAMIN D3 200MG-6.25
TABLET ORAL
Qty: 300 STRIP | Refills: 0 | Status: SHIPPED | OUTPATIENT
Start: 2019-01-01 | End: 2020-01-01

## 2019-01-01 RX ORDER — DULOXETIN HYDROCHLORIDE 30 MG/1
CAPSULE, DELAYED RELEASE ORAL
Qty: 90 CAPSULE | Refills: 2 | Status: SHIPPED | OUTPATIENT
Start: 2019-01-01 | End: 2020-01-01

## 2019-01-01 RX ORDER — TAMSULOSIN HYDROCHLORIDE 0.4 MG/1
0.4 CAPSULE ORAL AT BEDTIME
Status: DISCONTINUED | OUTPATIENT
Start: 2019-01-01 | End: 2019-01-01 | Stop reason: HOSPADM

## 2019-01-01 RX ORDER — POTASSIUM CHLORIDE 1.5 G/1.58G
20-40 POWDER, FOR SOLUTION ORAL
Status: DISCONTINUED | OUTPATIENT
Start: 2019-01-01 | End: 2019-01-01 | Stop reason: HOSPADM

## 2019-01-01 RX ORDER — ACETAMINOPHEN 500 MG
1000 TABLET ORAL EVERY 6 HOURS PRN
COMMUNITY
End: 2020-01-01 | Stop reason: DRUGHIGH

## 2019-01-01 RX ORDER — LEVETIRACETAM 500 MG/1
500 TABLET ORAL 2 TIMES DAILY
Status: DISCONTINUED | OUTPATIENT
Start: 2019-01-01 | End: 2019-01-01 | Stop reason: HOSPADM

## 2019-01-01 RX ORDER — IOPAMIDOL 755 MG/ML
120 INJECTION, SOLUTION INTRAVASCULAR ONCE
Status: COMPLETED | OUTPATIENT
Start: 2019-01-01 | End: 2019-01-01

## 2019-01-01 RX ORDER — MAGNESIUM OXIDE 400 MG/1
400 TABLET ORAL 2 TIMES DAILY
Status: DISCONTINUED | OUTPATIENT
Start: 2019-01-01 | End: 2019-01-01 | Stop reason: HOSPADM

## 2019-01-01 RX ORDER — SENNOSIDES 8.6 MG
2 TABLET ORAL DAILY
Start: 2019-01-01 | End: 2020-01-01

## 2019-01-01 RX ORDER — MAGNESIUM SULFATE HEPTAHYDRATE 40 MG/ML
4 INJECTION, SOLUTION INTRAVENOUS EVERY 4 HOURS PRN
Status: DISCONTINUED | OUTPATIENT
Start: 2019-01-01 | End: 2019-01-01 | Stop reason: HOSPADM

## 2019-01-01 RX ORDER — NICOTINE POLACRILEX 4 MG
15-30 LOZENGE BUCCAL
Status: DISCONTINUED | OUTPATIENT
Start: 2019-01-01 | End: 2019-01-01 | Stop reason: HOSPADM

## 2019-01-01 RX ORDER — LEVETIRACETAM 750 MG/1
750 TABLET ORAL 2 TIMES DAILY
Status: DISCONTINUED | OUTPATIENT
Start: 2019-01-01 | End: 2019-01-01

## 2019-01-01 RX ORDER — NALOXONE HYDROCHLORIDE 0.4 MG/ML
.1-.4 INJECTION, SOLUTION INTRAMUSCULAR; INTRAVENOUS; SUBCUTANEOUS
Status: DISCONTINUED | OUTPATIENT
Start: 2019-01-01 | End: 2019-01-01 | Stop reason: HOSPADM

## 2019-01-01 RX ORDER — LEVETIRACETAM 500 MG/1
500 TABLET ORAL 2 TIMES DAILY
Status: ON HOLD | DISCHARGE
Start: 2019-01-01 | End: 2020-01-01

## 2019-01-01 RX ORDER — ONDANSETRON 4 MG/1
4 TABLET, ORALLY DISINTEGRATING ORAL EVERY 6 HOURS PRN
Status: DISCONTINUED | OUTPATIENT
Start: 2019-01-01 | End: 2019-01-01 | Stop reason: HOSPADM

## 2019-01-01 RX ORDER — DEXTROSE MONOHYDRATE 25 G/50ML
25-50 INJECTION, SOLUTION INTRAVENOUS
Status: DISCONTINUED | OUTPATIENT
Start: 2019-01-01 | End: 2019-01-01 | Stop reason: HOSPADM

## 2019-01-01 RX ORDER — TAMSULOSIN HYDROCHLORIDE 0.4 MG/1
CAPSULE ORAL
Qty: 90 CAPSULE | Refills: 3 | Status: SHIPPED | OUTPATIENT
Start: 2019-01-01 | End: 2020-01-01

## 2019-01-01 RX ORDER — SODIUM CHLORIDE 9 MG/ML
INJECTION, SOLUTION INTRAVENOUS CONTINUOUS
Status: DISCONTINUED | OUTPATIENT
Start: 2019-01-01 | End: 2019-01-01

## 2019-01-01 RX ADMIN — ATORVASTATIN CALCIUM 40 MG: 40 TABLET, FILM COATED ORAL at 08:13

## 2019-01-01 RX ADMIN — TICAGRELOR 90 MG: 90 TABLET ORAL at 20:26

## 2019-01-01 RX ADMIN — INSULIN ASPART 2 UNITS: 100 INJECTION, SOLUTION INTRAVENOUS; SUBCUTANEOUS at 17:53

## 2019-01-01 RX ADMIN — MAGNESIUM SULFATE HEPTAHYDRATE 2 G: 40 INJECTION, SOLUTION INTRAVENOUS at 08:15

## 2019-01-01 RX ADMIN — INSULIN ASPART 2 UNITS: 100 INJECTION, SOLUTION INTRAVENOUS; SUBCUTANEOUS at 13:56

## 2019-01-01 RX ADMIN — LEVETIRACETAM 500 MG: 500 TABLET, FILM COATED ORAL at 08:15

## 2019-01-01 RX ADMIN — INSULIN GLARGINE 18 UNITS: 100 INJECTION, SOLUTION SUBCUTANEOUS at 22:47

## 2019-01-01 RX ADMIN — MAGNESIUM OXIDE TAB 400 MG (241.3 MG ELEMENTAL MG) 400 MG: 400 (241.3 MG) TAB at 23:25

## 2019-01-01 RX ADMIN — LEVETIRACETAM 750 MG: 750 TABLET, FILM COATED ORAL at 20:59

## 2019-01-01 RX ADMIN — MAGNESIUM OXIDE TAB 400 MG (241.3 MG ELEMENTAL MG) 400 MG: 400 (241.3 MG) TAB at 08:15

## 2019-01-01 RX ADMIN — SODIUM CHLORIDE: 9 INJECTION, SOLUTION INTRAVENOUS at 22:24

## 2019-01-01 RX ADMIN — IOPAMIDOL 120 ML: 755 INJECTION, SOLUTION INTRAVENOUS at 18:42

## 2019-01-01 RX ADMIN — CLOPIDOGREL BISULFATE 75 MG: 75 TABLET ORAL at 08:13

## 2019-01-01 RX ADMIN — DULOXETINE HYDROCHLORIDE 30 MG: 30 CAPSULE, DELAYED RELEASE ORAL at 08:30

## 2019-01-01 RX ADMIN — TICAGRELOR 180 MG: 90 TABLET ORAL at 14:59

## 2019-01-01 RX ADMIN — TAMSULOSIN HYDROCHLORIDE 0.4 MG: 0.4 CAPSULE ORAL at 21:23

## 2019-01-01 RX ADMIN — TAMSULOSIN HYDROCHLORIDE 0.4 MG: 0.4 CAPSULE ORAL at 22:08

## 2019-01-01 RX ADMIN — TICAGRELOR 90 MG: 90 TABLET ORAL at 09:02

## 2019-01-01 RX ADMIN — MAGNESIUM OXIDE TAB 400 MG (241.3 MG ELEMENTAL MG) 400 MG: 400 (241.3 MG) TAB at 08:30

## 2019-01-01 RX ADMIN — DULOXETINE HYDROCHLORIDE 30 MG: 30 CAPSULE, DELAYED RELEASE ORAL at 08:13

## 2019-01-01 RX ADMIN — INSULIN ASPART 1 UNITS: 100 INJECTION, SOLUTION INTRAVENOUS; SUBCUTANEOUS at 08:30

## 2019-01-01 RX ADMIN — MAGNESIUM OXIDE TAB 400 MG (241.3 MG ELEMENTAL MG) 400 MG: 400 (241.3 MG) TAB at 09:02

## 2019-01-01 RX ADMIN — LEVETIRACETAM 750 MG: 750 TABLET, FILM COATED ORAL at 22:33

## 2019-01-01 RX ADMIN — LEVETIRACETAM 750 MG: 750 TABLET, FILM COATED ORAL at 09:02

## 2019-01-01 RX ADMIN — INSULIN ASPART 2 UNITS: 100 INJECTION, SOLUTION INTRAVENOUS; SUBCUTANEOUS at 17:46

## 2019-01-01 RX ADMIN — LEVETIRACETAM 750 MG: 750 TABLET, FILM COATED ORAL at 19:57

## 2019-01-01 RX ADMIN — MAGNESIUM OXIDE TAB 400 MG (241.3 MG ELEMENTAL MG) 400 MG: 400 (241.3 MG) TAB at 08:23

## 2019-01-01 RX ADMIN — OMEPRAZOLE 20 MG: 20 CAPSULE, DELAYED RELEASE ORAL at 08:23

## 2019-01-01 RX ADMIN — MAGNESIUM OXIDE TAB 400 MG (241.3 MG ELEMENTAL MG) 400 MG: 400 (241.3 MG) TAB at 19:57

## 2019-01-01 RX ADMIN — ATORVASTATIN CALCIUM 40 MG: 40 TABLET, FILM COATED ORAL at 09:02

## 2019-01-01 RX ADMIN — ATORVASTATIN CALCIUM 40 MG: 40 TABLET, FILM COATED ORAL at 08:14

## 2019-01-01 RX ADMIN — OMEPRAZOLE 20 MG: 20 CAPSULE, DELAYED RELEASE ORAL at 08:30

## 2019-01-01 RX ADMIN — TICAGRELOR 90 MG: 90 TABLET ORAL at 09:37

## 2019-01-01 RX ADMIN — TAMSULOSIN HYDROCHLORIDE 0.4 MG: 0.4 CAPSULE ORAL at 22:34

## 2019-01-01 RX ADMIN — TAMSULOSIN HYDROCHLORIDE 0.4 MG: 0.4 CAPSULE ORAL at 20:59

## 2019-01-01 RX ADMIN — OMEPRAZOLE 20 MG: 20 CAPSULE, DELAYED RELEASE ORAL at 09:02

## 2019-01-01 RX ADMIN — LEVETIRACETAM 500 MG: 500 TABLET, FILM COATED ORAL at 08:30

## 2019-01-01 RX ADMIN — MAGNESIUM OXIDE TAB 400 MG (241.3 MG ELEMENTAL MG) 400 MG: 400 (241.3 MG) TAB at 22:33

## 2019-01-01 RX ADMIN — MAGNESIUM OXIDE TAB 400 MG (241.3 MG ELEMENTAL MG) 400 MG: 400 (241.3 MG) TAB at 20:26

## 2019-01-01 RX ADMIN — INSULIN ASPART 1 UNITS: 100 INJECTION, SOLUTION INTRAVENOUS; SUBCUTANEOUS at 09:37

## 2019-01-01 RX ADMIN — INSULIN GLARGINE 14 UNITS: 100 INJECTION, SOLUTION SUBCUTANEOUS at 01:30

## 2019-01-01 RX ADMIN — INSULIN ASPART 3 UNITS: 100 INJECTION, SOLUTION INTRAVENOUS; SUBCUTANEOUS at 12:42

## 2019-01-01 RX ADMIN — INSULIN ASPART 1 UNITS: 100 INJECTION, SOLUTION INTRAVENOUS; SUBCUTANEOUS at 09:01

## 2019-01-01 RX ADMIN — INSULIN GLARGINE 18 UNITS: 100 INJECTION, SOLUTION SUBCUTANEOUS at 21:23

## 2019-01-01 RX ADMIN — TICAGRELOR 90 MG: 90 TABLET ORAL at 19:57

## 2019-01-01 RX ADMIN — DULOXETINE HYDROCHLORIDE 30 MG: 30 CAPSULE, DELAYED RELEASE ORAL at 08:15

## 2019-01-01 RX ADMIN — TICAGRELOR 90 MG: 90 TABLET ORAL at 08:30

## 2019-01-01 RX ADMIN — TAMSULOSIN HYDROCHLORIDE 0.4 MG: 0.4 CAPSULE ORAL at 22:41

## 2019-01-01 RX ADMIN — INSULIN ASPART 1 UNITS: 100 INJECTION, SOLUTION INTRAVENOUS; SUBCUTANEOUS at 10:18

## 2019-01-01 RX ADMIN — ATORVASTATIN CALCIUM 40 MG: 40 TABLET, FILM COATED ORAL at 08:30

## 2019-01-01 RX ADMIN — INSULIN ASPART 1 UNITS: 100 INJECTION, SOLUTION INTRAVENOUS; SUBCUTANEOUS at 12:36

## 2019-01-01 RX ADMIN — ACETAMINOPHEN 500 MG: 500 TABLET, FILM COATED ORAL at 08:13

## 2019-01-01 RX ADMIN — INSULIN ASPART 1 UNITS: 100 INJECTION, SOLUTION INTRAVENOUS; SUBCUTANEOUS at 16:57

## 2019-01-01 RX ADMIN — ASPIRIN 81 MG: 81 TABLET, DELAYED RELEASE ORAL at 09:38

## 2019-01-01 RX ADMIN — TICAGRELOR 90 MG: 90 TABLET ORAL at 08:14

## 2019-01-01 RX ADMIN — POLYETHYLENE GLYCOL 3350 17 G: 17 POWDER, FOR SOLUTION ORAL at 19:57

## 2019-01-01 RX ADMIN — INSULIN GLARGINE 18 UNITS: 100 INJECTION, SOLUTION SUBCUTANEOUS at 20:59

## 2019-01-01 RX ADMIN — INSULIN ASPART 1 UNITS: 100 INJECTION, SOLUTION INTRAVENOUS; SUBCUTANEOUS at 17:57

## 2019-01-01 RX ADMIN — DULOXETINE HYDROCHLORIDE 30 MG: 30 CAPSULE, DELAYED RELEASE ORAL at 09:02

## 2019-01-01 RX ADMIN — OMEPRAZOLE 20 MG: 20 CAPSULE, DELAYED RELEASE ORAL at 09:38

## 2019-01-01 RX ADMIN — LEVETIRACETAM 500 MG: 500 TABLET, FILM COATED ORAL at 19:57

## 2019-01-01 RX ADMIN — ACETAMINOPHEN 500 MG: 500 TABLET, FILM COATED ORAL at 16:15

## 2019-01-01 RX ADMIN — LEVETIRACETAM 750 MG: 750 TABLET, FILM COATED ORAL at 09:38

## 2019-01-01 RX ADMIN — OMEPRAZOLE 20 MG: 20 CAPSULE, DELAYED RELEASE ORAL at 08:14

## 2019-01-01 RX ADMIN — INSULIN GLARGINE 18 UNITS: 100 INJECTION, SOLUTION SUBCUTANEOUS at 22:40

## 2019-01-01 RX ADMIN — MAGNESIUM SULFATE IN WATER 4 G: 40 INJECTION, SOLUTION INTRAVENOUS at 09:30

## 2019-01-01 RX ADMIN — ASPIRIN 81 MG: 81 TABLET, DELAYED RELEASE ORAL at 08:30

## 2019-01-01 RX ADMIN — MAGNESIUM OXIDE TAB 400 MG (241.3 MG ELEMENTAL MG) 400 MG: 400 (241.3 MG) TAB at 09:37

## 2019-01-01 RX ADMIN — ATORVASTATIN CALCIUM 40 MG: 40 TABLET, FILM COATED ORAL at 09:38

## 2019-01-01 RX ADMIN — ASPIRIN 81 MG: 81 TABLET, DELAYED RELEASE ORAL at 08:13

## 2019-01-01 RX ADMIN — ONDANSETRON 4 MG: 2 INJECTION INTRAMUSCULAR; INTRAVENOUS at 11:14

## 2019-01-01 RX ADMIN — ACETAMINOPHEN 500 MG: 500 TABLET, FILM COATED ORAL at 09:44

## 2019-01-01 RX ADMIN — LEVETIRACETAM 750 MG: 750 TABLET, FILM COATED ORAL at 08:23

## 2019-01-01 RX ADMIN — LEVETIRACETAM 500 MG: 500 TABLET, FILM COATED ORAL at 20:29

## 2019-01-01 RX ADMIN — ASPIRIN 81 MG: 81 TABLET, DELAYED RELEASE ORAL at 09:02

## 2019-01-01 RX ADMIN — DULOXETINE HYDROCHLORIDE 30 MG: 30 CAPSULE, DELAYED RELEASE ORAL at 09:37

## 2019-01-01 RX ADMIN — ASPIRIN 81 MG: 81 TABLET, DELAYED RELEASE ORAL at 08:14

## 2019-01-01 ASSESSMENT — ENCOUNTER SYMPTOMS
HEMATURIA: 0
WOUND: 1
CONFUSION: 1
MYALGIAS: 1
DIZZINESS: 1
CONFUSION: 1
TREMORS: 0
SEIZURES: 1
DYSURIA: 0
SPEECH DIFFICULTY: 1
FREQUENCY: 0

## 2019-01-01 ASSESSMENT — ACTIVITIES OF DAILY LIVING (ADL)
ADLS_ACUITY_SCORE: 23
BATHING: 2-->ASSISTIVE PERSON
DEPENDENT_IADLS:: CLEANING;COOKING;LAUNDRY
DEPENDENT_IADLS:: CLEANING;COOKING;LAUNDRY
RETIRED_EATING: 0-->INDEPENDENT
WHICH_OF_THE_ABOVE_FUNCTIONAL_RISKS_HAD_A_RECENT_ONSET_OR_CHANGE?: AMBULATION;TRANSFERRING;TOILETING;BATHING;DRESSING;COGNITION;COMMUNICATION/SPEECH
ADLS_ACUITY_SCORE: 28
TOILETING: 1-->ASSISTIVE EQUIPMENT
ADLS_ACUITY_SCORE: 28
SWALLOWING: 0-->SWALLOWS FOODS/LIQUIDS WITHOUT DIFFICULTY
DEPENDENT_IADLS:: CLEANING;COOKING;LAUNDRY
ADLS_ACUITY_SCORE: 29
TRANSFERRING: 1-->ASSISTIVE EQUIPMENT
ADLS_ACUITY_SCORE: 28
DEPENDENT_IADLS:: CLEANING;COOKING;LAUNDRY
ADLS_ACUITY_SCORE: 29
DEPENDENT_IADLS:: CLEANING;COOKING;LAUNDRY
ADLS_ACUITY_SCORE: 23
ADLS_ACUITY_SCORE: 28
ADLS_ACUITY_SCORE: 23
ADLS_ACUITY_SCORE: 28
ADLS_ACUITY_SCORE: 28
DRESS: 1-->ASSISTIVE EQUIPMENT
ADLS_ACUITY_SCORE: 28
ADLS_ACUITY_SCORE: 28
RETIRED_COMMUNICATION: 2-->DIFFICULTY UNDERSTANDING (NOT RELATED TO LANGUAGE BARRIER)
ADLS_ACUITY_SCORE: 28
ADLS_ACUITY_SCORE: 29
AMBULATION: 1-->ASSISTIVE EQUIPMENT
ADLS_ACUITY_SCORE: 28
ADLS_ACUITY_SCORE: 23
FALL_HISTORY_WITHIN_LAST_SIX_MONTHS: NO
COGNITION: 1 - ATTENTION OR MEMORY DEFICITS

## 2019-01-01 ASSESSMENT — MIFFLIN-ST. JEOR
SCORE: 1303.55
SCORE: 1318.97
SCORE: 1320.78
SCORE: 1359.79
SCORE: 1455.04
SCORE: 1310.8
SCORE: 1417.85
SCORE: 1348.91

## 2019-01-01 ASSESSMENT — PATIENT HEALTH QUESTIONNAIRE - PHQ9: SUM OF ALL RESPONSES TO PHQ QUESTIONS 1-9: 8

## 2019-01-29 DIAGNOSIS — K59.00 CONSTIPATION, UNSPECIFIED CONSTIPATION TYPE: ICD-10-CM

## 2019-01-30 NOTE — TELEPHONE ENCOUNTER
Pending Prescriptions:                       Disp   Refills    MAGNESIUM-OXIDE 400 (241.3 Mg) MG tablet *180 ta*0            Sig: TAKE 1 TABLET BY MOUTH TWICE DAILY          Last Written Prescription Date:  07/31/2018  Last Fill Quantity: 180,   # refills: 1  Last Office Visit: 11/29/2018  Future Office visit:    Next 5 appointments (look out 90 days)    Feb 28, 2019  6:30 PM CST  Office Visit with Matt Morrissey MD  [facility] [address] [facility] [address]  [phone]           Routing refill request to provider for review/approval because:  Drug not on the [facility], [facility] or [place] refill protocol or controlled substance

## 2019-01-30 NOTE — TELEPHONE ENCOUNTER
Routing refill request to provider for review/approval because:  Drug not on the [facility] refill protocol   Stacy MOORE RN

## 2019-02-28 ENCOUNTER — OFFICE VISIT (OUTPATIENT)
Dept: FAMILY MEDICINE | Facility: CLINIC | Age: 84
End: 2019-02-28
Payer: MEDICARE

## 2019-02-28 VITALS
OXYGEN SATURATION: 93 % | SYSTOLIC BLOOD PRESSURE: 137 MMHG | HEIGHT: 70 IN | DIASTOLIC BLOOD PRESSURE: 62 MMHG | HEART RATE: 72 BPM | TEMPERATURE: 97.9 F | BODY MASS INDEX: 23.62 KG/M2 | WEIGHT: 165 LBS

## 2019-02-28 DIAGNOSIS — E11.65 TYPE 2 DIABETES MELLITUS WITH HYPERGLYCEMIA, WITH LONG-TERM CURRENT USE OF INSULIN (H): Primary | ICD-10-CM

## 2019-02-28 DIAGNOSIS — E78.5 HYPERLIPIDEMIA, UNSPECIFIED HYPERLIPIDEMIA TYPE: ICD-10-CM

## 2019-02-28 DIAGNOSIS — L57.0 ACTINIC KERATOSIS: ICD-10-CM

## 2019-02-28 DIAGNOSIS — I71.40 ABDOMINAL AORTIC ANEURYSM (AAA) WITHOUT RUPTURE (H): ICD-10-CM

## 2019-02-28 DIAGNOSIS — I25.118 CORONARY ARTERY DISEASE OF NATIVE ARTERY OF NATIVE HEART WITH STABLE ANGINA PECTORIS (H): ICD-10-CM

## 2019-02-28 DIAGNOSIS — Z79.4 TYPE 2 DIABETES MELLITUS WITH HYPERGLYCEMIA, WITH LONG-TERM CURRENT USE OF INSULIN (H): Primary | ICD-10-CM

## 2019-02-28 LAB — HBA1C MFR BLD: 8.7 % (ref 0–5.6)

## 2019-02-28 PROCEDURE — 36415 COLL VENOUS BLD VENIPUNCTURE: CPT | Performed by: INTERNAL MEDICINE

## 2019-02-28 PROCEDURE — 99214 OFFICE O/P EST MOD 30 MIN: CPT | Mod: 25 | Performed by: INTERNAL MEDICINE

## 2019-02-28 PROCEDURE — 80048 BASIC METABOLIC PNL TOTAL CA: CPT | Performed by: INTERNAL MEDICINE

## 2019-02-28 PROCEDURE — 83036 HEMOGLOBIN GLYCOSYLATED A1C: CPT | Performed by: INTERNAL MEDICINE

## 2019-02-28 PROCEDURE — 17000 DESTRUCT PREMALG LESION: CPT | Performed by: INTERNAL MEDICINE

## 2019-02-28 PROCEDURE — 83721 ASSAY OF BLOOD LIPOPROTEIN: CPT | Performed by: INTERNAL MEDICINE

## 2019-02-28 PROCEDURE — 82043 UR ALBUMIN QUANTITATIVE: CPT | Performed by: INTERNAL MEDICINE

## 2019-02-28 RX ORDER — INSULIN GLARGINE 100 [IU]/ML
22 INJECTION, SOLUTION SUBCUTANEOUS AT BEDTIME
Qty: 15 ML | Refills: 11 | COMMUNITY
Start: 2019-02-28 | End: 2019-05-30

## 2019-02-28 ASSESSMENT — MIFFLIN-ST. JEOR: SCORE: 1409.69

## 2019-02-28 NOTE — LETTER
[id number] [facility]  [address]  [place]  [address]  Tel: [phone]    March 4, 2019    Dustin Pearce  [address] [facility] [address]        Dear Mr. Pearce,    The following letter pertains to your most recent diagnostic tests:    -Your micro albumin level is elevated. This means you have trace amounts of protein in the urine. It indicates that your kidneys are being affected by diabetes. Keeping blood pressure low is the best treatment in order to keep this stable. People with microalbuminuria should avoid anti-inflamatory agents such as motrin, alleve and ibuprofen as much as possible because excessive use of these medications can be harmful to the kidneys. Anybody that has microalbuminuria should be on lisinopril or losartan-as you already are-since these medications are protective for the kidney's in this situation.    -Cholesterol looks OK.    -Kidney function is normal for you (Creatinine, GFR), Sodium is normal for you, Potassium is normal for you, Calcium is normal for you, Glucose (blood sugar) is elevated.      -Your hemoglobin A1c test which is a diabetes blood test that represents and average of your blood sugars over the last 3 months returned at 8.7 which is above your goal of hemoglobin A1c less than 8.         Bottom line:  Based on these results, I would recommend increasing glargine insulin from 18 units daily to 22 units daily.  Call me if you measure blood sugars less than 90 after making these changes.  Return in 3 months to recheck the A1c after this medication change.      Follow up:  Office visit appointment with A1c in 3 months.      Sincerely,    Matt Morrissey MD/[facility]          Enclosure: Lab Results  Results for orders placed or performed in visit on 02/28/19   BASIC METABOLIC PANEL   Result Value Ref Range    Sodium 141 133 - 144 mmol/L    Potassium 5.1 3.4 - 5.3 mmol/L    Chloride 107 94 - 109 mmol/L    Carbon Dioxide 26 20 - 32 mmol/L    Anion Gap  8 3 - 14 mmol/L    Glucose 147 (H) 70 - 99 mg/dL    Urea Nitrogen 22 7 - 30 mg/dL    Creatinine 0.87 0.66 - 1.25 mg/dL    GFR Estimate 76 >60 mL/min/[1.73_m2]    GFR Estimate If Black 88 >60 mL/min/[1.73_m2]    Calcium 9.7 8.5 - 10.1 mg/dL   Albumin Random Urine Quantitative with Creat Ratio   Result Value Ref Range    Creatinine Urine 197 mg/dL    Albumin Urine mg/L 92 mg/L    Albumin Urine mg/g Cr 46.80 (H) 0 - 17 mg/g Cr   Hemoglobin A1c   Result Value Ref Range    Hemoglobin A1C 8.7 (H) 0 - 5.6 %   LDL cholesterol direct   Result Value Ref Range    LDL Cholesterol Direct 87 <100 mg/dL

## 2019-03-01 LAB
ANION GAP SERPL CALCULATED.3IONS-SCNC: 8 MMOL/L (ref 3–14)
BUN SERPL-MCNC: 22 MG/DL (ref 7–30)
CALCIUM SERPL-MCNC: 9.7 MG/DL (ref 8.5–10.1)
CHLORIDE SERPL-SCNC: 107 MMOL/L (ref 94–109)
CO2 SERPL-SCNC: 26 MMOL/L (ref 20–32)
CREAT SERPL-MCNC: 0.87 MG/DL (ref 0.66–1.25)
CREAT UR-MCNC: 197 MG/DL
GFR SERPL CREATININE-BSD FRML MDRD: 76 ML/MIN/{1.73_M2}
GLUCOSE SERPL-MCNC: 147 MG/DL (ref 70–99)
LDLC SERPL DIRECT ASSAY-MCNC: 87 MG/DL
MICROALBUMIN UR-MCNC: 92 MG/L
MICROALBUMIN/CREAT UR: 46.8 MG/G CR (ref 0–17)
POTASSIUM SERPL-SCNC: 5.1 MMOL/L (ref 3.4–5.3)
SODIUM SERPL-SCNC: 141 MMOL/L (ref 133–144)

## 2019-03-01 NOTE — PROGRESS NOTES
SUBJECTIVE:   Dustin Pearce is a 89 year old male who presents to clinic today for the following health issues:        3 month follow up    This patient returns in follow-up with his significant other for his diabetes, hypertension, hyperlipidemia, coronary artery disease, abdominal aortic aneurysm.   He feels well with the exception of a enlarging skin lesion on his left distal forearm that has been present for several months.    Problem list and histories reviewed & adjusted, as indicated.  Additional history: as documented    Patient Active Problem List   Diagnosis     Coronary artery disease of native artery of native heart with stable angina pectoris (H)     Hyperlipidemia, unspecified hyperlipidemia type     Benign non-nodular prostatic hyperplasia with lower urinary tract symptoms     Gastroesophageal reflux disease without esophagitis     Cerebrovascular accident (H)     Carotid artery stenosis     Abdominal aortic aneurysm (H)     Lumbar back pain     Benign essential hypertension     TIA (transient ischemic attack)     Paroxysmal atrial fibrillation (H)     Ischemic cardiomyopathy     Aortic root dilatation (H)     Physical deconditioning     Diabetic ulcer of left foot associated with diabetes mellitus due to underlying condition (H)     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)     Anemia due to blood loss, acute     Diarrhea, unspecified type     Nausea and vomiting, intractability of vomiting not specified, unspecified vomiting type     Acute pain of left shoulder     Balance problems     Generalized muscle weakness     Peripheral artery disease (H)     Aortic stenosis     RBBB with left anterior fascicular block     Stroke of unknown etiology (H)     Carotid stenosis     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)     History of TIA (transient ischemic attack) and stroke     UTI (urinary tract infection)     UTI (urinary tract infection) due to Enterococcus      Generalized weakness     Type 2 diabetes mellitus with hyperglycemia, unspecified long term insulin use status     Atherosclerosis of coronary artery of native heart without angina pectoris, unspecified vessel or lesion type     Depression, unspecified depression type     Slow transit constipation     Severe sepsis (H)     Chest discomfort     NSTEMI (non-ST elevated myocardial infarction) (H)     Anemia     Cognitive impairment     Acute on chronic systolic congestive heart failure (H)     Benign prostatic hyperplasia without lower urinary tract symptoms     Past Surgical History:   Procedure Laterality Date     AMPUTATE FOOT Left 6/4/2017    Procedure: AMPUTATE FOOT;  Sun Add#3: partial left foot amputation - Sagital Saw - Mini C-Arm;  Surgeon: Moe Kirk DPM;  Location:  OR     CHOLECYSTECTOMY       ENDARTERECTOMY CAROTID Right 1/3/2018    Procedure: ENDARTERECTOMY CAROTID;  RIGHT CAROTID ENDARTERECTOMY WITH EEG;  Surgeon: Roland Rodriguez MD;  Location:  OR     ESOPHAGOSCOPY, GASTROSCOPY, DUODENOSCOPY (EGD), COMBINED N/A 12/26/2016    Procedure: COMBINED ESOPHAGOSCOPY, GASTROSCOPY, DUODENOSCOPY (EGD);  Surgeon: Nasim Louis MD;  Location:  GI     GENITOURINARY SURGERY      KIDNEY STONE REMOVAL X 4     HC UGI ENDOSCOPY W EUS N/A 12/29/2016    Procedure: COMBINED ENDOSCOPIC ULTRASOUND, ESOPHAGOSCOPY, GASTROSCOPY, DUODENOSCOPY (EGD);  Surgeon: Nasim Louis MD;  Location:  GI     HERNIA REPAIR       INCISION AND DRAINAGE FOOT, COMBINED Left 6/6/2017    Procedure: COMBINED INCISION AND DRAINAGE FOOT;  REVISIONAL LEFT FOOT INCISION AND DRAINAGE WITH POSSIBLE FLAP CLOSURE , ANTIBIOTIC SOLUTION ;  Surgeon: Nabil Ann DPM;  Location:  OR     LASER HOLMIUM LITHOTRIPSY URETER(S), INSERT STENT, COMBINED  3/2/2012    Procedure:COMBINED CYSTOSCOPY, URETEROSCOPY, LASER HOLMIUM LITHOTRIPSY URETER(S), INSERT STENT; CYSTOSCOPY, RIGHT RETROGRADES, RIGHT URETEROSCOPY, HOLMIUM  LASER, RIGHT STENT PLACEMENT; Surgeon:ANJELICA LEÓN; Location:[facility] OR     ROTATOR CUFF REPAIR RT/LT         Social History     Tobacco Use     Smoking status: Former Smoker     Packs/day: 1.00     Years: 30.00     Pack years: 30.00     Types: Cigarettes     Last attempt to quit: 1999     Years since quittin.1     Smokeless tobacco: Never Used   Substance Use Topics     Alcohol use: Yes     Alcohol/week: 4.2 oz     Types: 7 Standard drinks or equivalent per week     Comment: 1 drink per biweekly     Family History   Problem Relation Age of Onset     Breast Cancer Mother      Heart Failure Father 81         Current Outpatient Medications   Medication Sig Dispense Refill     AMITRIPTYLINE HCL PO Take 25 mg by mouth nightly as needed for sleep       aspirin EC 81 MG EC tablet Take 1 tablet (81 mg) by mouth daily 30 tablet 0     ATORVASTATIN CALCIUM PO Take 40 mg by mouth At Bedtime        bisacodyl (DULCOLAX) 10 MG Suppository Place 10 mg rectally daily as needed for constipation       blood glucose monitoring (NO BRAND SPECIFIED) meter device kit Use to test blood sugar bid times daily or as directed. 1 kit 0     blood glucose monitoring (TRUE METRIX BLOOD GLUCOSE TEST) test strip USE TO TEST BLOOD SUGAR THREE TIMES DAILY OR AS DIRECTED 300 strip 1     Cholecalciferol 53670 UNITS TABS Take 1 tablet by mouth three times a week (Mon, Wed, Fri)       clopidogrel (PLAVIX) 75 MG tablet TAKE 1 TABLET BY MOUTH DAILY 90 tablet 2     DULoxetine (CYMBALTA) 30 MG EC capsule TAKE 1 CAPSULE(30 MG) BY MOUTH DAILY 90 capsule 3     ferrous gluconate (FERGON) 324 (38 Fe) MG tablet Take 1 tablet (324 mg) by mouth daily (with breakfast) 100 tablet 0     furosemide (LASIX) 20 MG tablet Take 1 tablet (20 mg) by mouth every other day Hold if systolic bp < 100 or patients appears dehydrated or having poor oral intake 30 tablet 0     insulin glargine (BASAGLAR KWIKPEN) 100 UNIT/ML pen Inject 18 Units Subcutaneous daily 15 mL 11  "    ipratropium (ATROVENT) 0.03 % nasal spray INHALE 1 SPRAY IN EACH NOSTRIL EVERY 12 HOURS AS NEEDED 30 mL 0     isosorbide mononitrate (IMDUR) 30 MG 24 hr tablet Take 2 tablets (60 mg) by mouth daily 60 tablet 0     lisinopril (PRINIVIL/ZESTRIL) 5 MG tablet Take 1 tablet (5 mg) by mouth daily 30 tablet 1     MAGNESIUM-OXIDE 400 (241.3 Mg) MG tablet TAKE 1 TABLET BY MOUTH TWICE DAILY 180 tablet 0     metFORMIN (GLUCOPHAGE) 1000 MG tablet TAKE 1 TABLET BY MOUTH TWICE DAILY WITH MEALS 180 tablet 3     METOPROLOL TARTRATE PO Take 25 mg by mouth 2 times daily       nitroGLYcerin (NITROSTAT) 0.4 MG sublingual tablet For chest pain place 1 tablet under the tongue every 5 minutes for 3 doses. If symptoms persist 5 minutes after 1st dose call 911. 25 tablet 0     omeprazole (PRILOSEC) 40 MG DR capsule TAKE 1 CAPSULE(40 MG) BY MOUTH DAILY 30 TO 60 MINUTES BEFORE A MEAL 90 capsule 3     OMEPRAZOLE PO Take 40 mg by mouth 2 times daily        order for DME Equipment being ordered: True Matrix Blood Glucose meter. 1 Act 11     polyethylene glycol (MIRALAX/GLYCOLAX) Packet Take 17 g by mouth daily as needed for constipation       tamsulosin (FLOMAX) 0.4 MG capsule TAKE ONE CAPSULE BY MOUTH DAILY 90 capsule 3     Allergies   Allergen Reactions     Oxycodone Other (See Comments)     \"TERRIBLE SWEATING\"     Sulfa Drugs      Tetracycline        Reviewed and updated as needed this visit by clinical staff       Reviewed and updated as needed this visit by Provider         ROS:  Constitutional, HEENT, cardiovascular, pulmonary, gi and gu systems are negative, except as otherwise noted.    OBJECTIVE:     /62 (BP Location: Right arm, Patient Position: Chair, Cuff Size: Adult Large)   Pulse 72   Temp 97.9  F (36.6  C) (Tympanic)   Ht 1.778 m (5' 10\")   Wt 74.8 kg (165 lb)   SpO2 93%   BMI 23.68 kg/m    Body mass index is 23.68 kg/m .  General: This is a well-appearing man in no acute distress unchanged from previous exams.  " Skin: There is a scaly lesion on the left distal forearm consistent with a actinic keratosis.    Diagnostic Test Results:  Labs pending    ASSESSMENT/PLAN:       1. Type 2 diabetes mellitus with hyperglycemia, with long-term current use of insulin (H)  Recheck A1c, goal less than 8  - insulin glargine (BASAGLAR KWIKPEN) 100 UNIT/ML pen; Inject 18 Units Subcutaneous daily  Dispense: 15 mL; Refill: 11  - BASIC METABOLIC PANEL  - Albumin Random Urine Quantitative with Creat Ratio  - Hemoglobin A1c    2. Coronary artery disease of native artery of native heart with stable angina pectoris (H)  Stable symptoms, continue follow-up with cardiology as directed    3. Abdominal aortic aneurysm (AAA) without rupture (H)  This was last imaged in May 2018, consider reimaging with ultrasound after next visit in 3 months    4. Actinic keratosis  Liquid nitrogen was applied to the skin lesion times 10 seconds x3 applications.  The patient's significant other was instructed that he should return if the lesion persists after 2 weeks for another freezing session.  - DESTRUC BENIGN/PREMAL,1ST LESION [61690]    5. Hyperlipidemia, unspecified hyperlipidemia type    - LDL cholesterol direct    FUTURE APPOINTMENTS:       - Follow-up visit pending response to liquid nitrogen therapy, A1c results and symptoms    Matt Morrissey MD  [facility]

## 2019-03-01 NOTE — PROGRESS NOTES
Subjective: Dustin Pearce a 89 year old male who presents today for lesion removal. The lesion(s) is/are located on the {location:113450}, number {NUMBERS 1-20:666210} and measures {NUMBERS 1-20:291060}cm He The patient reports the lesion is {symptoms:171917} and denies other significant symptoms on ROS. Medications reviewed.    Pause for the cause has been completed prior to the prceedure.   1. Dustin was identified by both name and date of birth   2. The correct site was identified   3. Site was marked by provider    4. Written informed consent correct and signed or verbal authorization  to proceed was obtained   5. Verifed necessary supplies, equipment, and diagnostics are available    6. Time out was performed immediately prior to procedure    Objective: The lesion(s) is/are of the above mentioned size and location and is/are {description:610466}. The area was prepped and appropriately anesthetized. Using the usual technique, {JW PROCEDURE:340791} was performed. An appropriate dressing was applied. The procedure was well tolerated and without complications.     Assessment: {LESION DX:065333}    Plan: Follow up: {FOLLOW UP:5261}. Wound care instructions provided.  Patient was instructed to be alert for any signs of cutaneous infection.

## 2019-03-02 NOTE — RESULT ENCOUNTER NOTE
The following letter pertains to your most recent diagnostic tests:    -Your micro albumin level is elevated. This means you have trace amounts of protein in the urine. It indicates that your kidneys are being affected by diabetes. Keeping blood pressure low is the best treatment in order to keep this stable. People with microalbuminuria should avoid anti-inflamatory agents such as motrin, alleve and ibuprofen as much as possible because excessive use of these medications can be harmful to the kidneys. Anybody that has microalbuminuria should be on lisinopril or losartan-as you already are-since these medications are protective for the kidney's in this situation.    -Cholesterol looks OK.    -Kidney function is normal for you (Creatinine, GFR), Sodium is normal for you, Potassium is normal for you, Calcium is normal for you, Glucose (blood sugar) is elevated.      -Your hemoglobin A1c test which is a diabetes blood test that represents and average of your blood sugars over the last 3 months returned at 8.7 which is above your goal of hemoglobin A1c less than 8.         Bottom line:  Based on these results, I would recommend increasing glargine insulin from 18 units daily to 22 units daily.  Call me if you measure blood sugars less than 90 after making these changes.  Return in 3 months to recheck the A1c after this medication change.        Follow up:  Office visit appointment with A1c in 3 months.        Sincerely,    Dr. Morrissey

## 2019-03-13 ENCOUNTER — TELEPHONE (OUTPATIENT)
Dept: FAMILY MEDICINE | Facility: CLINIC | Age: 84
End: 2019-03-13

## 2019-03-13 NOTE — TELEPHONE ENCOUNTER
Reason for Call: appointment    Detailed comments: Patient was told if he should follow up within 2 weeks from   Feb 28th if the Liquid Nitrogen therapy didn't work, does  want to squeeze him  In?  Call to schedule appointment     Phone Number Patient can be reached at: Home number on file [phone] (home)    Best Time: anytime    Can we leave a detailed message on this number? YES    Call taken on 3/13/2019 at 8:19 AM by Bong Kim

## 2019-03-22 ENCOUNTER — OFFICE VISIT (OUTPATIENT)
Dept: FAMILY MEDICINE | Facility: CLINIC | Age: 84
End: 2019-03-22
Payer: MEDICARE

## 2019-03-22 VITALS
OXYGEN SATURATION: 97 % | WEIGHT: 168.9 LBS | HEIGHT: 70 IN | DIASTOLIC BLOOD PRESSURE: 61 MMHG | TEMPERATURE: 97.1 F | BODY MASS INDEX: 24.18 KG/M2 | SYSTOLIC BLOOD PRESSURE: 136 MMHG | HEART RATE: 71 BPM

## 2019-03-22 DIAGNOSIS — M71.121 SEPTIC BURSITIS OF ELBOW, RIGHT: Primary | ICD-10-CM

## 2019-03-22 DIAGNOSIS — Z79.4 TYPE 2 DIABETES MELLITUS WITH COMPLICATION, WITH LONG-TERM CURRENT USE OF INSULIN (H): ICD-10-CM

## 2019-03-22 DIAGNOSIS — E11.8 TYPE 2 DIABETES MELLITUS WITH COMPLICATION, WITH LONG-TERM CURRENT USE OF INSULIN (H): ICD-10-CM

## 2019-03-22 DIAGNOSIS — D48.5 NEOPLASM OF UNCERTAIN BEHAVIOR OF SKIN: ICD-10-CM

## 2019-03-22 PROCEDURE — 20610 DRAIN/INJ JOINT/BURSA W/O US: CPT | Performed by: INTERNAL MEDICINE

## 2019-03-22 PROCEDURE — 99213 OFFICE O/P EST LOW 20 MIN: CPT | Mod: 25 | Performed by: INTERNAL MEDICINE

## 2019-03-22 RX ORDER — CEPHALEXIN 500 MG/1
500 CAPSULE ORAL 2 TIMES DAILY
Qty: 14 CAPSULE | Refills: 0 | Status: SHIPPED | OUTPATIENT
Start: 2019-03-22 | End: 2019-04-08

## 2019-03-22 RX ORDER — LIDOCAINE HYDROCHLORIDE 10 MG/ML
1 INJECTION, SOLUTION INFILTRATION; PERINEURAL ONCE
Status: DISCONTINUED | OUTPATIENT
Start: 2019-03-22 | End: 2019-04-08

## 2019-03-22 ASSESSMENT — MIFFLIN-ST. JEOR: SCORE: 1427.38

## 2019-03-22 NOTE — PROGRESS NOTES
SUBJECTIVE:   Dustin Pearce is a 89 year old male who presents to clinic today for the following health issues:    89-year-old man with multiple problems including cognitive impairment, coronary artery disease, cerebrovascular disease status post TIA, carotid stenosis, type 2 diabetes that is insulin-dependent, hyperlipidemia, depression, peripheral neuropathy, hypertension, benign prostatic hypertrophy.  He is a former cigarette smoker and accompanied by his wife.  He presents with concerns about pain, redness and swelling in his right elbow.  The symptoms have decreased significantly since his wife expressed a large amount of pus from a swelling overlying his right elbow.  This is the second time in several years that this has occurred.  He has been trying to not rest his elbow on hard surfaces.  He also had a verrucous skin lesion on his left forearm destroyed with liquid nitrogen about 14 days ago, but the lesion has not changed at all since that treatment.  Review of systems: He denies fevers or chills, nausea or vomiting.  He has been working hard on changing his diet because his A1c was elevated at last check.          Problem list and histories reviewed & adjusted, as indicated.  Additional history: as documented    Patient Active Problem List   Diagnosis     Coronary artery disease of native artery of native heart with stable angina pectoris (H)     Hyperlipidemia, unspecified hyperlipidemia type     Benign non-nodular prostatic hyperplasia with lower urinary tract symptoms     Gastroesophageal reflux disease without esophagitis     Cerebrovascular accident (H)     Carotid artery stenosis     Abdominal aortic aneurysm (H)     Lumbar back pain     Benign essential hypertension     TIA (transient ischemic attack)     Paroxysmal atrial fibrillation (H)     Ischemic cardiomyopathy     Aortic root dilatation (H)     Physical deconditioning     Diabetic ulcer of left foot associated with diabetes mellitus due  to underlying condition (H)     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)     Anemia due to blood loss, acute     Diarrhea, unspecified type     Nausea and vomiting, intractability of vomiting not specified, unspecified vomiting type     Acute pain of left shoulder     Balance problems     Generalized muscle weakness     Peripheral artery disease (H)     Aortic stenosis     RBBB with left anterior fascicular block     Stroke of unknown etiology (H)     Carotid stenosis     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)     History of TIA (transient ischemic attack) and stroke     UTI (urinary tract infection)     UTI (urinary tract infection) due to Enterococcus     Generalized weakness     Type 2 diabetes mellitus with complication, with long-term current use of insulin (H)     Atherosclerosis of coronary artery of native heart without angina pectoris, unspecified vessel or lesion type     Depression, unspecified depression type     Slow transit constipation     Severe sepsis (H)     Chest discomfort     NSTEMI (non-ST elevated myocardial infarction) (H)     Anemia     Cognitive impairment     Acute on chronic systolic congestive heart failure (H)     Benign prostatic hyperplasia without lower urinary tract symptoms     Past Surgical History:   Procedure Laterality Date     AMPUTATE FOOT Left 6/4/2017    Procedure: AMPUTATE FOOT;  Sun Add#3: partial left foot amputation - Sagital Saw - Mini C-Arm;  Surgeon: Moe Kirk DPM;  Location:  OR     CHOLECYSTECTOMY       ENDARTERECTOMY CAROTID Right 1/3/2018    Procedure: ENDARTERECTOMY CAROTID;  RIGHT CAROTID ENDARTERECTOMY WITH EEG;  Surgeon: Roland Rodriguez MD;  Location:  OR     ESOPHAGOSCOPY, GASTROSCOPY, DUODENOSCOPY (EGD), COMBINED N/A 12/26/2016    Procedure: COMBINED ESOPHAGOSCOPY, GASTROSCOPY, DUODENOSCOPY (EGD);  Surgeon: Nasim Louis MD;  Location:  GI     GENITOURINARY SURGERY      KIDNEY STONE  REMOVAL X 4     HC UGI ENDOSCOPY W EUS N/A 2016    Procedure: COMBINED ENDOSCOPIC ULTRASOUND, ESOPHAGOSCOPY, GASTROSCOPY, DUODENOSCOPY (EGD);  Surgeon: Nasim Louis MD;  Location:  GI     HERNIA REPAIR       INCISION AND DRAINAGE FOOT, COMBINED Left 2017    Procedure: COMBINED INCISION AND DRAINAGE FOOT;  REVISIONAL LEFT FOOT INCISION AND DRAINAGE WITH POSSIBLE FLAP CLOSURE , ANTIBIOTIC SOLUTION ;  Surgeon: Nabil Ann DPM;  Location:  OR     LASER HOLMIUM LITHOTRIPSY URETER(S), INSERT STENT, COMBINED  3/2/2012    Procedure:COMBINED CYSTOSCOPY, URETEROSCOPY, LASER HOLMIUM LITHOTRIPSY URETER(S), INSERT STENT; CYSTOSCOPY, RIGHT RETROGRADES, RIGHT URETEROSCOPY, HOLMIUM LASER, RIGHT STENT PLACEMENT; Surgeon:ANJELICA LEÓN; Location: OR     ROTATOR CUFF REPAIR RT/LT         Social History     Tobacco Use     Smoking status: Former Smoker     Packs/day: 1.00     Years: 30.00     Pack years: 30.00     Types: Cigarettes     Last attempt to quit: 1999     Years since quittin.2     Smokeless tobacco: Never Used   Substance Use Topics     Alcohol use: Yes     Alcohol/week: 4.2 oz     Types: 7 Standard drinks or equivalent per week     Comment: 1 drink per biweekly     Family History   Problem Relation Age of Onset     Breast Cancer Mother      Heart Failure Father 81         Current Outpatient Medications   Medication Sig Dispense Refill     AMITRIPTYLINE HCL PO Take 25 mg by mouth nightly as needed for sleep       aspirin EC 81 MG EC tablet Take 1 tablet (81 mg) by mouth daily 30 tablet 0     ATORVASTATIN CALCIUM PO Take 40 mg by mouth At Bedtime        bisacodyl (DULCOLAX) 10 MG Suppository Place 10 mg rectally daily as needed for constipation       blood glucose monitoring (NO BRAND SPECIFIED) meter device kit Use to test blood sugar bid times daily or as directed. 1 kit 0     blood glucose monitoring (TRUE METRIX BLOOD GLUCOSE TEST) test strip USE TO TEST BLOOD SUGAR THREE  TIMES DAILY OR AS DIRECTED 300 strip 1     cephALEXin (KEFLEX) 500 MG capsule Take 1 capsule (500 mg) by mouth 2 times daily 14 capsule 0     clopidogrel (PLAVIX) 75 MG tablet TAKE 1 TABLET BY MOUTH DAILY 90 tablet 2     DULoxetine (CYMBALTA) 30 MG EC capsule TAKE 1 CAPSULE(30 MG) BY MOUTH DAILY 90 capsule 3     ferrous gluconate (FERGON) 324 (38 Fe) MG tablet Take 1 tablet (324 mg) by mouth daily (with breakfast) 100 tablet 0     furosemide (LASIX) 20 MG tablet Take 1 tablet (20 mg) by mouth every other day Hold if systolic bp < 100 or patients appears dehydrated or having poor oral intake 30 tablet 0     insulin glargine (BASAGLAR KWIKPEN) 100 UNIT/ML pen Inject 18 Units Subcutaneous daily 15 mL 11     ipratropium (ATROVENT) 0.03 % nasal spray INHALE 1 SPRAY IN EACH NOSTRIL EVERY 12 HOURS AS NEEDED 30 mL 0     isosorbide mononitrate (IMDUR) 30 MG 24 hr tablet Take 2 tablets (60 mg) by mouth daily 60 tablet 0     lisinopril (PRINIVIL/ZESTRIL) 5 MG tablet Take 1 tablet (5 mg) by mouth daily 30 tablet 1     MAGNESIUM-OXIDE 400 (241.3 Mg) MG tablet TAKE 1 TABLET BY MOUTH TWICE DAILY 180 tablet 0     metFORMIN (GLUCOPHAGE) 1000 MG tablet TAKE 1 TABLET BY MOUTH TWICE DAILY WITH MEALS 180 tablet 3     METOPROLOL TARTRATE PO Take 25 mg by mouth 2 times daily       nitroGLYcerin (NITROSTAT) 0.4 MG sublingual tablet For chest pain place 1 tablet under the tongue every 5 minutes for 3 doses. If symptoms persist 5 minutes after 1st dose call 911. 25 tablet 0     omeprazole (PRILOSEC) 40 MG DR capsule TAKE 1 CAPSULE(40 MG) BY MOUTH DAILY 30 TO 60 MINUTES BEFORE A MEAL 90 capsule 3     OMEPRAZOLE PO Take 40 mg by mouth 2 times daily        order for DME Equipment being ordered: True Matrix Blood Glucose meter. 1 Act 11     polyethylene glycol (MIRALAX/GLYCOLAX) Packet Take 17 g by mouth daily as needed for constipation       tamsulosin (FLOMAX) 0.4 MG capsule TAKE ONE CAPSULE BY MOUTH DAILY 90 capsule 3     Allergies   Allergen  "Reactions     Oxycodone Other (See Comments)     \"TERRIBLE SWEATING\"     Sulfa Drugs      Tetracycline        Reviewed and updated as needed this visit by clinical staff       Reviewed and updated as needed this visit by Provider         ROS:  As per HPI    OBJECTIVE:     /61 (BP Location: Right arm, Cuff Size: Adult Large)   Pulse 71   Temp 97.1  F (36.2  C) (Oral)   Ht 1.778 m (5' 10\")   Wt 76.6 kg (168 lb 14.4 oz)   SpO2 97%   BMI 24.23 kg/m    Body mass index is 24.23 kg/m .  General: This is an unchanged appearing elderly man in no acute distress.  He does not appear toxic.  There is mild swelling overlying the right olecranon bursa with mild erythema there is an area where previous purulent material must have been expressed by direct pressure by his wife, there is a mild amount of clear drainage from that area observable, there is still some fluid palpable underneath the skin surface.  The elbow has full range of motion.  Skin: The verrucous lesion on the distal left forearm is unchanged from previous exam and suspicious for an underlying squamous cell carcinoma.        ASSESSMENT/PLAN:       1. Septic bursitis of elbow, right    This patient presents with recurrent septic bursitis of the right elbow.  This is the second event in a short time interval.  He might benefit from a consultation with orthopedics to discuss a possible bursectomy.  His wife seem to express most of the infected fluid from the bursa with direct pressure several days before this clinic visit, but I did elect to perform an aspiration procedure to see if any residual fluid can be expressed from the affected elbow.  The risks and benefits of the procedure were explained to the patient and his wife.  I received verbal consent to proceed with the procedure.      After discussion of risks and benefits, informed consent was obtained for aspiration of the olecranon bursa. The area was prepped and draped in sterile fashion. 1% " lidocaine was used for local anesthesia.  Less than 1 mL of blood-tinged fluid was aspirated  from the bursal space not enough to send for culture or crystal analysis.  Because of concern for infection, I elected not to inject cortisone into the bursal space. Aftercare was discussed with the patient. The procedure was tolerated well and there were no complications.     Start oral antibiotics given concern for possible infected bursal space.  His wife will call me if his symptoms are not improved by the middle of next week or sooner if symptoms worsen.    - ORTHOPEDICS ADULT REFERRAL  - cephALEXin (KEFLEX) 500 MG capsule; Take 1 capsule (500 mg) by mouth 2 times daily  Dispense: 14 capsule; Refill: 0  - lidocaine 1 % injection 1 mL  - Large Joint/Bursa injection and/or drainage (Shoulder, Knee)    2. Neoplasm of uncertain behavior of skin  This skin neoplasm did not respond to liquid nitrogen destruction.  This likely needs an excision and because of that, the patient was referred to dermatology.  His wife has seen a dermatologist [facility] and request to go there.  - DERMATOLOGY REFERRAL    3. Type 2 diabetes mellitus with complication, with long-term current use of insulin (H)  He was again counseled on diet interventions to help his A1c get down below his goal of at less than 8.  He is scheduled to see me in May for that purpose.          Matt Morrissey MD  [facility]

## 2019-03-25 DIAGNOSIS — I42.9 CARDIOMYOPATHY, UNSPECIFIED TYPE (H): ICD-10-CM

## 2019-03-25 NOTE — TELEPHONE ENCOUNTER
"Requesting Metoprolol Succinate 25 mg take 1/2 tab daily   We have entry for tartrate  25 mg twice daily historically entered 6/2018    Last Written Prescription Date:  -----  Last Fill Quantity: ----,  # refills: ----   Last office visit: 3/22/2019 with prescribing provider:     Future Office Visit:   Next 5 appointments (look out 90 days)    May 30, 2019  6:30 PM CDT  Office Visit with Matt Morrissey MD  [facility] ([facility] [address] [facility] [id number] [phone]             Requested Prescriptions   Pending Prescriptions Disp Refills     metoprolol succinate ER (TOPROL-XL) 25 MG 24 hr tablet [Pharmacy Med Name: METOPROLOL ER SUCCINATE 25MG TABS] 45 tablet 0     Sig: TAKE 1/2 TABLET BY MOUTH DAILY    Beta-Blockers Protocol Failed - 3/25/2019  3:14 AM       Failed - Medication is active on med list       Passed - Blood pressure under 140/90 in past 12 months    BP Readings from Last 3 Encounters:   03/22/19 136/61   02/28/19 137/62   11/29/18 136/60                Passed - Patient is age 6 or older       Passed - Recent (12 mo) or future (30 days) visit within the authorizing provider's specialty    Patient had office visit in the last 12 months or has a visit in the next 30 days with authorizing provider or within the authorizing provider's specialty.  See \"Patient Info\" tab in inbasket, or \"Choose Columns\" in Meds & Orders section of the refill encounter.                "

## 2019-03-26 RX ORDER — METOPROLOL SUCCINATE 25 MG/1
TABLET, EXTENDED RELEASE ORAL
Qty: 45 TABLET | Refills: 11 | Status: SHIPPED | OUTPATIENT
Start: 2019-03-26 | End: 2019-04-08

## 2019-03-26 NOTE — TELEPHONE ENCOUNTER
Routing refill request to provider for review/approval because:  Medication is reported/historical from 6/2018    Requesting Metoprolol Succinate 25 mg take 1/2 tab daily   We have entry for tartrate  25 mg twice daily historically entered 6/2018     Please authorize if appropriate.  Thanks,  Kirti Wade RN

## 2019-04-08 ENCOUNTER — TELEPHONE (OUTPATIENT)
Dept: FAMILY MEDICINE | Facility: CLINIC | Age: 84
End: 2019-04-08

## 2019-04-08 ENCOUNTER — APPOINTMENT (OUTPATIENT)
Dept: CARDIOLOGY | Facility: CLINIC | Age: 84
End: 2019-04-08
Attending: PHYSICIAN ASSISTANT
Payer: MEDICARE

## 2019-04-08 ENCOUNTER — HOSPITAL ENCOUNTER (OUTPATIENT)
Facility: CLINIC | Age: 84
Setting detail: OBSERVATION
Discharge: HOME | End: 2019-04-09
Attending: EMERGENCY MEDICINE | Admitting: HOSPITALIST
Payer: MEDICARE

## 2019-04-08 ENCOUNTER — TRANSFERRED RECORDS (OUTPATIENT)
Dept: HEALTH INFORMATION MANAGEMENT | Facility: CLINIC | Age: 84
End: 2019-04-08

## 2019-04-08 ENCOUNTER — APPOINTMENT (OUTPATIENT)
Dept: MRI IMAGING | Facility: CLINIC | Age: 84
End: 2019-04-08
Attending: PHYSICIAN ASSISTANT
Payer: MEDICARE

## 2019-04-08 ENCOUNTER — APPOINTMENT (OUTPATIENT)
Dept: CT IMAGING | Facility: CLINIC | Age: 84
End: 2019-04-08
Attending: EMERGENCY MEDICINE
Payer: MEDICARE

## 2019-04-08 DIAGNOSIS — R55 SYNCOPE, UNSPECIFIED SYNCOPE TYPE: ICD-10-CM

## 2019-04-08 LAB
ALBUMIN UR-MCNC: 10 MG/DL
ANION GAP SERPL CALCULATED.3IONS-SCNC: 6 MMOL/L (ref 3–14)
APPEARANCE UR: CLEAR
APTT PPP: 29 SEC (ref 22–37)
BASOPHILS # BLD AUTO: 0 10E9/L (ref 0–0.2)
BASOPHILS NFR BLD AUTO: 0.2 %
BILIRUB UR QL STRIP: NEGATIVE
BUN SERPL-MCNC: 21 MG/DL (ref 7–30)
CALCIUM SERPL-MCNC: 9 MG/DL (ref 8.5–10.1)
CHLORIDE SERPL-SCNC: 104 MMOL/L (ref 94–109)
CO2 SERPL-SCNC: 28 MMOL/L (ref 20–32)
COLOR UR AUTO: YELLOW
CREAT SERPL-MCNC: 0.86 MG/DL (ref 0.66–1.25)
DIFFERENTIAL METHOD BLD: ABNORMAL
EOSINOPHIL # BLD AUTO: 0.1 10E9/L (ref 0–0.7)
EOSINOPHIL NFR BLD AUTO: 1 %
ERYTHROCYTE [DISTWIDTH] IN BLOOD BY AUTOMATED COUNT: 13.8 % (ref 10–15)
GFR SERPL CREATININE-BSD FRML MDRD: 76 ML/MIN/{1.73_M2}
GLUCOSE BLDC GLUCOMTR-MCNC: 135 MG/DL (ref 70–99)
GLUCOSE BLDC GLUCOMTR-MCNC: 211 MG/DL (ref 70–99)
GLUCOSE BLDC GLUCOMTR-MCNC: 230 MG/DL (ref 70–99)
GLUCOSE SERPL-MCNC: 195 MG/DL (ref 70–99)
GLUCOSE UR STRIP-MCNC: NEGATIVE MG/DL
HCT VFR BLD AUTO: 40.1 % (ref 40–53)
HGB BLD-MCNC: 12.7 G/DL (ref 13.3–17.7)
HGB UR QL STRIP: ABNORMAL
IMM GRANULOCYTES # BLD: 0 10E9/L (ref 0–0.4)
IMM GRANULOCYTES NFR BLD: 0.1 %
INR PPP: 1 (ref 0.86–1.14)
INTERPRETATION ECG - MUSE: NORMAL
KETONES UR STRIP-MCNC: 5 MG/DL
LEUKOCYTE ESTERASE UR QL STRIP: NEGATIVE
LYMPHOCYTES # BLD AUTO: 3.1 10E9/L (ref 0.8–5.3)
LYMPHOCYTES NFR BLD AUTO: 30.6 %
MCH RBC QN AUTO: 29 PG (ref 26.5–33)
MCHC RBC AUTO-ENTMCNC: 31.7 G/DL (ref 31.5–36.5)
MCV RBC AUTO: 92 FL (ref 78–100)
MONOCYTES # BLD AUTO: 0.9 10E9/L (ref 0–1.3)
MONOCYTES NFR BLD AUTO: 8.8 %
NEUTROPHILS # BLD AUTO: 6 10E9/L (ref 1.6–8.3)
NEUTROPHILS NFR BLD AUTO: 59.3 %
NITRATE UR QL: NEGATIVE
NRBC # BLD AUTO: 0 10*3/UL
NRBC BLD AUTO-RTO: 0 /100
PH UR STRIP: 7.5 PH (ref 5–7)
PLATELET # BLD AUTO: 179 10E9/L (ref 150–450)
POTASSIUM SERPL-SCNC: 4.9 MMOL/L (ref 3.4–5.3)
RBC # BLD AUTO: 4.38 10E12/L (ref 4.4–5.9)
RBC #/AREA URNS AUTO: 87 /HPF (ref 0–2)
SODIUM SERPL-SCNC: 138 MMOL/L (ref 133–144)
SOURCE: ABNORMAL
SP GR UR STRIP: 1.01 (ref 1–1.03)
SPERM #/AREA URNS HPF: PRESENT /HPF
SQUAMOUS #/AREA URNS AUTO: 2 /HPF (ref 0–1)
UROBILINOGEN UR STRIP-MCNC: NORMAL MG/DL (ref 0–2)
WBC # BLD AUTO: 10.1 10E9/L (ref 4–11)
WBC #/AREA URNS AUTO: 1 /HPF (ref 0–5)

## 2019-04-08 PROCEDURE — 70450 CT HEAD/BRAIN W/O DYE: CPT

## 2019-04-08 PROCEDURE — 25500064 ZZH RX 255 OP 636: Performed by: HOSPITALIST

## 2019-04-08 PROCEDURE — 93005 ELECTROCARDIOGRAM TRACING: CPT

## 2019-04-08 PROCEDURE — 25000128 H RX IP 250 OP 636: Performed by: EMERGENCY MEDICINE

## 2019-04-08 PROCEDURE — 99285 EMERGENCY DEPT VISIT HI MDM: CPT | Mod: 25

## 2019-04-08 PROCEDURE — 85730 THROMBOPLASTIN TIME PARTIAL: CPT | Performed by: EMERGENCY MEDICINE

## 2019-04-08 PROCEDURE — A9585 GADOBUTROL INJECTION: HCPCS | Performed by: HOSPITALIST

## 2019-04-08 PROCEDURE — 99215 OFFICE O/P EST HI 40 MIN: CPT | Performed by: PSYCHIATRY & NEUROLOGY

## 2019-04-08 PROCEDURE — 85610 PROTHROMBIN TIME: CPT | Performed by: EMERGENCY MEDICINE

## 2019-04-08 PROCEDURE — G0378 HOSPITAL OBSERVATION PER HR: HCPCS

## 2019-04-08 PROCEDURE — 81001 URINALYSIS AUTO W/SCOPE: CPT | Performed by: EMERGENCY MEDICINE

## 2019-04-08 PROCEDURE — 70553 MRI BRAIN STEM W/O & W/DYE: CPT

## 2019-04-08 PROCEDURE — A9270 NON-COVERED ITEM OR SERVICE: HCPCS | Mod: GY | Performed by: PHYSICIAN ASSISTANT

## 2019-04-08 PROCEDURE — 99220 ZZC INITIAL OBSERVATION CARE,LEVL III: CPT | Performed by: PHYSICIAN ASSISTANT

## 2019-04-08 PROCEDURE — 85025 COMPLETE CBC W/AUTO DIFF WBC: CPT | Performed by: EMERGENCY MEDICINE

## 2019-04-08 PROCEDURE — 25000125 ZZHC RX 250: Performed by: EMERGENCY MEDICINE

## 2019-04-08 PROCEDURE — 25800030 ZZH RX IP 258 OP 636: Performed by: PHYSICIAN ASSISTANT

## 2019-04-08 PROCEDURE — 00000146 ZZHCL STATISTIC GLUCOSE BY METER IP

## 2019-04-08 PROCEDURE — 40000264 ECHOCARDIOGRAM COMPLETE

## 2019-04-08 PROCEDURE — 93306 TTE W/DOPPLER COMPLETE: CPT | Mod: 26 | Performed by: INTERNAL MEDICINE

## 2019-04-08 PROCEDURE — 96372 THER/PROPH/DIAG INJ SC/IM: CPT

## 2019-04-08 PROCEDURE — 70498 CT ANGIOGRAPHY NECK: CPT

## 2019-04-08 PROCEDURE — 25000131 ZZH RX MED GY IP 250 OP 636 PS 637: Performed by: PHYSICIAN ASSISTANT

## 2019-04-08 PROCEDURE — 80048 BASIC METABOLIC PNL TOTAL CA: CPT | Performed by: EMERGENCY MEDICINE

## 2019-04-08 PROCEDURE — 25000132 ZZH RX MED GY IP 250 OP 250 PS 637: Mod: GY | Performed by: PHYSICIAN ASSISTANT

## 2019-04-08 RX ORDER — LISINOPRIL 5 MG/1
5 TABLET ORAL DAILY
Status: DISCONTINUED | OUTPATIENT
Start: 2019-04-09 | End: 2019-04-09 | Stop reason: HOSPADM

## 2019-04-08 RX ORDER — DEXTROSE MONOHYDRATE 25 G/50ML
25-50 INJECTION, SOLUTION INTRAVENOUS
Status: DISCONTINUED | OUTPATIENT
Start: 2019-04-08 | End: 2019-04-09 | Stop reason: HOSPADM

## 2019-04-08 RX ORDER — AMOXICILLIN 250 MG
1 CAPSULE ORAL 2 TIMES DAILY PRN
Status: DISCONTINUED | OUTPATIENT
Start: 2019-04-08 | End: 2019-04-09 | Stop reason: HOSPADM

## 2019-04-08 RX ORDER — AMOXICILLIN 250 MG
2 CAPSULE ORAL 2 TIMES DAILY PRN
Status: DISCONTINUED | OUTPATIENT
Start: 2019-04-08 | End: 2019-04-09 | Stop reason: HOSPADM

## 2019-04-08 RX ORDER — TAMSULOSIN HYDROCHLORIDE 0.4 MG/1
0.4 CAPSULE ORAL AT BEDTIME
COMMUNITY
End: 2020-01-01

## 2019-04-08 RX ORDER — PROCHLORPERAZINE 25 MG
12.5 SUPPOSITORY, RECTAL RECTAL EVERY 12 HOURS PRN
Status: DISCONTINUED | OUTPATIENT
Start: 2019-04-08 | End: 2019-04-09 | Stop reason: HOSPADM

## 2019-04-08 RX ORDER — NICOTINE POLACRILEX 4 MG
15-30 LOZENGE BUCCAL
Status: DISCONTINUED | OUTPATIENT
Start: 2019-04-08 | End: 2019-04-09 | Stop reason: HOSPADM

## 2019-04-08 RX ORDER — NALOXONE HYDROCHLORIDE 0.4 MG/ML
.1-.4 INJECTION, SOLUTION INTRAMUSCULAR; INTRAVENOUS; SUBCUTANEOUS
Status: DISCONTINUED | OUTPATIENT
Start: 2019-04-08 | End: 2019-04-09 | Stop reason: HOSPADM

## 2019-04-08 RX ORDER — ONDANSETRON 4 MG/1
4 TABLET, ORALLY DISINTEGRATING ORAL EVERY 6 HOURS PRN
Status: DISCONTINUED | OUTPATIENT
Start: 2019-04-08 | End: 2019-04-09 | Stop reason: HOSPADM

## 2019-04-08 RX ORDER — POLYETHYLENE GLYCOL 3350 17 G/17G
17 POWDER, FOR SOLUTION ORAL DAILY PRN
Status: DISCONTINUED | OUTPATIENT
Start: 2019-04-08 | End: 2019-04-09 | Stop reason: HOSPADM

## 2019-04-08 RX ORDER — DULOXETIN HYDROCHLORIDE 30 MG/1
30 CAPSULE, DELAYED RELEASE ORAL DAILY
Status: DISCONTINUED | OUTPATIENT
Start: 2019-04-09 | End: 2019-04-09 | Stop reason: HOSPADM

## 2019-04-08 RX ORDER — TAMSULOSIN HYDROCHLORIDE 0.4 MG/1
0.4 CAPSULE ORAL AT BEDTIME
Status: DISCONTINUED | OUTPATIENT
Start: 2019-04-08 | End: 2019-04-09 | Stop reason: HOSPADM

## 2019-04-08 RX ORDER — METOPROLOL SUCCINATE 25 MG/1
12.5 TABLET, EXTENDED RELEASE ORAL AT BEDTIME
Status: ON HOLD | COMMUNITY
End: 2020-01-01

## 2019-04-08 RX ORDER — PROCHLORPERAZINE MALEATE 5 MG
5 TABLET ORAL EVERY 6 HOURS PRN
Status: DISCONTINUED | OUTPATIENT
Start: 2019-04-08 | End: 2019-04-09 | Stop reason: HOSPADM

## 2019-04-08 RX ORDER — CLOPIDOGREL BISULFATE 75 MG/1
75 TABLET ORAL DAILY
Status: DISCONTINUED | OUTPATIENT
Start: 2019-04-09 | End: 2019-04-09 | Stop reason: HOSPADM

## 2019-04-08 RX ORDER — SODIUM CHLORIDE 9 MG/ML
INJECTION, SOLUTION INTRAVENOUS CONTINUOUS
Status: DISCONTINUED | OUTPATIENT
Start: 2019-04-08 | End: 2019-04-09 | Stop reason: HOSPADM

## 2019-04-08 RX ORDER — LABETALOL 20 MG/4 ML (5 MG/ML) INTRAVENOUS SYRINGE
10-40 EVERY 10 MIN PRN
Status: DISCONTINUED | OUTPATIENT
Start: 2019-04-08 | End: 2019-04-09 | Stop reason: HOSPADM

## 2019-04-08 RX ORDER — IOPAMIDOL 755 MG/ML
120 INJECTION, SOLUTION INTRAVASCULAR ONCE
Status: COMPLETED | OUTPATIENT
Start: 2019-04-08 | End: 2019-04-08

## 2019-04-08 RX ORDER — ONDANSETRON 2 MG/ML
4 INJECTION INTRAMUSCULAR; INTRAVENOUS EVERY 6 HOURS PRN
Status: DISCONTINUED | OUTPATIENT
Start: 2019-04-08 | End: 2019-04-09 | Stop reason: HOSPADM

## 2019-04-08 RX ORDER — ATORVASTATIN CALCIUM 40 MG/1
40 TABLET, FILM COATED ORAL AT BEDTIME
Status: DISCONTINUED | OUTPATIENT
Start: 2019-04-08 | End: 2019-04-09 | Stop reason: HOSPADM

## 2019-04-08 RX ORDER — GADOBUTROL 604.72 MG/ML
7 INJECTION INTRAVENOUS ONCE
Status: COMPLETED | OUTPATIENT
Start: 2019-04-08 | End: 2019-04-08

## 2019-04-08 RX ORDER — ACETAMINOPHEN 325 MG/1
650 TABLET ORAL EVERY 4 HOURS PRN
Status: DISCONTINUED | OUTPATIENT
Start: 2019-04-08 | End: 2019-04-09 | Stop reason: HOSPADM

## 2019-04-08 RX ORDER — HYDRALAZINE HYDROCHLORIDE 20 MG/ML
10-20 INJECTION INTRAMUSCULAR; INTRAVENOUS
Status: DISCONTINUED | OUTPATIENT
Start: 2019-04-08 | End: 2019-04-09 | Stop reason: HOSPADM

## 2019-04-08 RX ORDER — ACETAMINOPHEN 650 MG/1
650 SUPPOSITORY RECTAL EVERY 4 HOURS PRN
Status: DISCONTINUED | OUTPATIENT
Start: 2019-04-08 | End: 2019-04-09 | Stop reason: HOSPADM

## 2019-04-08 RX ORDER — ASPIRIN 81 MG/1
81 TABLET ORAL DAILY
Status: DISCONTINUED | OUTPATIENT
Start: 2019-04-09 | End: 2019-04-09 | Stop reason: HOSPADM

## 2019-04-08 RX ADMIN — GADOBUTROL 7 ML: 604.72 INJECTION INTRAVENOUS at 18:00

## 2019-04-08 RX ADMIN — SODIUM CHLORIDE 100 ML: 9 INJECTION, SOLUTION INTRAVENOUS at 10:23

## 2019-04-08 RX ADMIN — IOPAMIDOL 70 ML: 755 INJECTION, SOLUTION INTRAVENOUS at 10:23

## 2019-04-08 RX ADMIN — SODIUM CHLORIDE: 9 INJECTION, SOLUTION INTRAVENOUS at 14:06

## 2019-04-08 RX ADMIN — INSULIN GLARGINE 22 UNITS: 100 INJECTION, SOLUTION SUBCUTANEOUS at 20:49

## 2019-04-08 RX ADMIN — INSULIN ASPART 2 UNITS: 100 INJECTION, SOLUTION INTRAVENOUS; SUBCUTANEOUS at 18:41

## 2019-04-08 RX ADMIN — TAMSULOSIN HYDROCHLORIDE 0.4 MG: 0.4 CAPSULE ORAL at 18:41

## 2019-04-08 RX ADMIN — SODIUM CHLORIDE 500 ML: 9 INJECTION, SOLUTION INTRAVENOUS at 12:54

## 2019-04-08 RX ADMIN — METOPROLOL SUCCINATE 12.5 MG: 25 TABLET, EXTENDED RELEASE ORAL at 20:48

## 2019-04-08 RX ADMIN — SODIUM CHLORIDE: 9 INJECTION, SOLUTION INTRAVENOUS at 22:52

## 2019-04-08 RX ADMIN — HUMAN ALBUMIN MICROSPHERES AND PERFLUTREN 9 ML: 10; .22 INJECTION, SOLUTION INTRAVENOUS at 15:23

## 2019-04-08 RX ADMIN — ATORVASTATIN CALCIUM 40 MG: 40 TABLET, FILM COATED ORAL at 20:49

## 2019-04-08 ASSESSMENT — MIFFLIN-ST. JEOR: SCORE: 1315.79

## 2019-04-08 ASSESSMENT — ENCOUNTER SYMPTOMS
SPEECH DIFFICULTY: 1
WEAKNESS: 1

## 2019-04-08 NOTE — ED PROVIDER NOTES
"  History     Chief Complaint:  Weakness, speech difficulty    HPI   Dustin Pearce is a 89 year old male with a complex past medical history including a CVA on Plavix who presents with weakness and speech difficulty. Per EMS report, the patient went to his PCP for an appointment today and while in the waiting room with his wife at 0945 the patient developed right sided weakness and difficulty speaking. EMS was called and noted that the patient's  on the right side seemed to be weak. Here in the ED, the patient is awake and alert, but minimally verbal and is unable to answer questions.       Allergies:  Oxycodone  Sulfa Drugs  Tetracycline     Medications:    Amitriptyline  Aspirin 81 mg   Atorvastatin  Dulcolax  Plavix   Cymbalta  Fergon  Lasix  Basaglar  Atrovent  Imdur  Prinivil  Glucophage  Toprol  Nitrostat  Prilosec  Flomax     Past Medical History:    Aortic root dilatation  Aortic stenosis  HTN  BPH  CAD  Cardiomyopathy  Carotid artery stenosis  Carotid occlusion, left  Diabetes  DJD  GERD  MI  Hyperlipidemia  Mild cognitive impairment  Nephrolithiasis  PAD  Paroxysmal atrial fibrillation  RBBB with left fascicular block  Cerebral artery occlusion with cerebral infarction    Past Surgical History:    Partial left foot amputation  cholecystectomy  Endarterectomy carotid  EGD    surgery  Hernia repair  Laser holmium lithotripsy ureter, insert stent  Rotator cuff repair    Family History:    Breast cancer  Heart failure    Social History:  Patient presents alone via EMS   Former smoker  Positive for alcohol use.      ROS limited due to aphasia  Review of Systems   Neurological: Positive for speech difficulty and weakness.   All other systems reviewed and are negative.      Physical Exam   First Vitals:  BP: 126/88  Pulse: 74  Heart Rate: 70  Temp: 97.5  F (36.4  C)  Resp: 14  Height: 167.6 cm (5' 6\")  Weight: 76.1 kg (167 lb 12.3 oz)  SpO2: 94 %      Physical Exam  Vitals: reviewed by me  General: Pt seen " on hospital danni, able to say his name, but not much else.  Does not consistently follow commands.  Eyes: Tracking well, clear conjunctiva BL  ENT: MMM, midline trachea.   Lungs:   No tachypnea, no accessory muscle use. No respiratory distress.   CV: Rate as above, regular rhythm.    Abd: Soft, non tender, no guarding, no rebound. Non distended  MSK: no peripheral edema or joint effusion.  No evidence of trauma  Skin: No rash, normal turgor and temperature  Neuro: Aphasic, looking around the room, poor eye contact.  No gaze deviation.  Unable to assess cranial nerves as patient not following commands with CN (does for BUE and BLE however)  BUE and BLE with SILT and 5/5 motor throughout  Psych: Not RIS, confused appearing       Emergency Department Course   ECG:  Time: 1046  Vent. Rate 71 bpm. UT interval 182. QRS duration 136. QT/QTc 446/484. P-R-T axis 76 -76 39. normal sinus rhythm. Left axis deviation. right bundle branch block. Abnormal ECG. Read time: 1046      Imaging:  Radiographic findings were communicated with the patient who voiced understanding of the findings.  CTA Angiogram Head and Neck:  1. Occluded left internal carotid artery. The occlusion extends from  the bifurcation to the terminal segment of the left internal carotid.  This is also present on the previous scan from 2017.  2. Collateral blood flow to the left middle cerebral artery via the  left posterior communicating artery and anterior communicating artery.  3. No occluded intracranial vessels are identified.  4. Moderate stenosis P2 segment left posterior cerebral. This is  unchanged since 2017.  As read by Radiology.    CT-scan Head w/o contrast:  1. No acute pathology. No bleed, mass, or areas of acute infarction  are identified.  2. Chronic encephalomalacia is again noted in the left frontal lobe.  This is unchanged.  3. There is diffuse parenchymal volume loss.  White matter changes are  present in the cerebral hemispheres that are  consistent with small  vessel ischemic disease in this age patient.  Result per radiology.        Laboratory:  BMP: Glucose 195 (H), o/w WNL (Creatinine: 0.86)  CBC: WBC: 10.1, HGB: 12.7 (L), PLT: 179  INR: 1.00  PTT: 29    Emergency Department Course:  Nursing notes and vitals reviewed.  1012: I performed an exam of the patient as documented above. Code stroke called    1026: I discussed the case with Dr. Davis of stroke neuro regarding the patient.    1042: I discussed the case with Dr. De La Vega of radiology regarding the patient.    1043: I rechecked the patient. Findings and plan explained to the Patient who consents to admission.     1050: Discussed the patient with Dr. Whitaker, who will admit the patient to a observation bed for further monitoring, evaluation, and treatment.     Impression & Plan    Medical Decision Making:  Dustin Pearce is a 89 year old male who presents to the emergency room with what appears to be a syncopal episode and possibly a stroke. He is now quite pleasant and is able to explain his day, but initially when he came in he was effectively non verbal, concerning for some level of aphasia. Patient was able to move all of his extremities, though with great encouragement, this led me to call a code stroke. Neurology kindly came down and evaluated the patient as well and since his imaging was viewed as having no new perfusion deficits or clots that were seen, coupled with the patient s rapidly improving mental status, the decision was made to deescalate and not give TPA. Patient was again observed, here and now he is very friendly, converses well and appears more to have had a syncopal episode versus less likely sub clinical status, and for this reason I do think he needs to be admitted. Neurology is still following, they will still do an MRI, and after speaking with the wife it is concerning that he has had several of these near syncopal episodes in the several weeks. Again, I do think he  would benefit from an admission with an echo and telemetry, patient is ok with this plan as is wife. We will plan for admission as above, admitted ot the care of Dr. Whitaker, who very generously accepted care.     Critical care time 35 minutes     Diagnosis:    ICD-10-CM    1. Syncope, unspecified syncope type R55        Armand BOYD, juan carlos serving as a scribe on 4/8/2019 at 11:23 AM to personally document services performed by Matt Bliss MD based on my observations and the provider's statements to me.       Armand Jeronimo  4/8/2019    EMERGENCY DEPARTMENT       Matt Bliss MD  04/08/19 [id number]

## 2019-04-08 NOTE — PLAN OF CARE
A&Ox2, disoriented to time, situation. Can state he is in the hospital in [place]. VSS on RA. Denies pain, neuros intact. Up Ax1 with cane. Diabetic. Passed bedside swallow. Mod carb diet. Voiding, urinal at bedside. UA sent. IV NS bolus given, changed to 75/hr. Plan for OT, PT, SW, Neuro, speech consults and echo bubble study.

## 2019-04-08 NOTE — ED NOTES
"[facility]  ED Nurse Handoff Report    ED Chief complaint: Cerebrovascular Accident (At eye doctor for procedure became aphasic and RIght side weakness, )      ED Diagnosis:   Final diagnoses:   None       Code Status: Full Code    Allergies:   Allergies   Allergen Reactions     Oxycodone Other (See Comments)     \"TERRIBLE SWEATING\"     Sulfa Drugs      Tetracycline        Activity level - Baseline/Home:  Stand with Assist    Activity Level - Current:   Stand with Assist     Needed?: No    Isolation: No  Infection: Not Applicable  Bariatric?: No    Vital Signs:   Vitals:    04/08/19 1040 04/08/19 1044 04/08/19 1052 04/08/19 1100   BP:  148/72  152/69   Pulse:  71  74   Resp:  12  17   Temp:       TempSrc:       SpO2: 96% 96%  94%   Weight:   93.6 kg (206 lb 4 oz)        Cardiac Rhythm: ,   Cardiac  Cardiac Rhythm: Normal sinus rhythm    Pain level:      Is this patient confused?: Yes reportedly some mild dementia  Does this patient have a guardian?  No         If yes, is there guardianship documents in the Epic \"Code/ACP\" activity?  N/A         Guardian Notified?  N/A  [place] - Suicide Severity Rating Scale Completed?  Yes  If yes, what color did the patient score?  White    Patient Report: Initial Complaint: Arrived via EMS from eye surgeon office. Had an injection procedure this morning. Post-procedure developed aphasia and right sided weakness. Has Hx of prior stroke affecting the left hemisphere. Some dementia at baseline. Wife is here with him and is a good historian   Focused Assessment: on arrival was still fairly aphasic but has since improved. Initial weakness to the right and and leg has improved. Pronator drift to the right arm and leg resolved  Tests Performed: labs, EKG, head CT  Abnormal Results:   Results for orders placed or performed during the hospital encounter of 04/08/19 (from the past 24 hour(s))   Basic metabolic panel   Result Value Ref Range    Sodium 138 133 - " 144 mmol/L    Potassium 4.9 3.4 - 5.3 mmol/L    Chloride 104 94 - 109 mmol/L    Carbon Dioxide 28 20 - 32 mmol/L    Anion Gap 6 3 - 14 mmol/L    Glucose 195 (H) 70 - 99 mg/dL    Urea Nitrogen 21 7 - 30 mg/dL    Creatinine 0.86 0.66 - 1.25 mg/dL    GFR Estimate 76 >60 mL/min/[1.73_m2]    GFR Estimate If Black 88 >60 mL/min/[1.73_m2]    Calcium 9.0 8.5 - 10.1 mg/dL   CBC with platelets differential   Result Value Ref Range    WBC 10.1 4.0 - 11.0 10e9/L    RBC Count 4.38 (L) 4.4 - 5.9 10e12/L    Hemoglobin 12.7 (L) 13.3 - 17.7 g/dL    Hematocrit 40.1 40.0 - 53.0 %    MCV 92 78 - 100 fl    MCH 29.0 26.5 - 33.0 pg    MCHC 31.7 31.5 - 36.5 g/dL    RDW 13.8 10.0 - 15.0 %    Platelet Count 179 150 - 450 10e9/L    Diff Method Automated Method     % Neutrophils 59.3 %    % Lymphocytes 30.6 %    % Monocytes 8.8 %    % Eosinophils 1.0 %    % Basophils 0.2 %    % Immature Granulocytes 0.1 %    Nucleated RBCs 0 0 /100    Absolute Neutrophil 6.0 1.6 - 8.3 10e9/L    Absolute Lymphocytes 3.1 0.8 - 5.3 10e9/L    Absolute Monocytes 0.9 0.0 - 1.3 10e9/L    Absolute Eosinophils 0.1 0.0 - 0.7 10e9/L    Absolute Basophils 0.0 0.0 - 0.2 10e9/L    Abs Immature Granulocytes 0.0 0 - 0.4 10e9/L    Absolute Nucleated RBC 0.0    INR   Result Value Ref Range    INR 1.00 0.86 - 1.14   Partial thromboplastin time   Result Value Ref Range    PTT 29 22 - 37 sec   CT Head w/o Contrast    Narrative    CT SCAN OF THE HEAD WITHOUT CONTRAST   4/8/2019 10:28 AM     HISTORY: Code stroke, aphasia and right-sided weakness.    TECHNIQUE:  Axial images of the head and coronal reformations without  IV contrast material. Radiation dose for this scan was reduced using  automated exposure control, adjustment of the mA and/or kV according  to patient size, or iterative reconstruction technique.    COMPARISON: None.    FINDINGS:     Intracranial contents: There is diffuse parenchymal volume loss.   White matter changes are present in the cerebral hemispheres that  are  consistent with small vessel ischemic disease in this age patient.  Chronic encephalomalacia is again noted in the left frontal lobe. This  is unchanged. There is no evidence of intracranial hemorrhage, mass,  acute infarct or anomaly.    Visualized orbits/sinuses/mastoids:  The visualized portions of the  sinuses and mastoids appear normal.    Osseous structures/soft tissues:  There is no evidence of trauma.      Impression    IMPRESSION:   1. No acute pathology. No bleed, mass, or areas of acute infarction  are identified.  2. Chronic encephalomalacia is again noted in the left frontal lobe.  This is unchanged.  3. There is diffuse parenchymal volume loss.  White matter changes are  present in the cerebral hemispheres that are consistent with small  vessel ischemic disease in this age patient.     CTA Head Neck with Contrast    Narrative    CTA  HEAD NECK WITH CONTRAST  4/8/2019 10:31 AM     HISTORY: Code Stroke    TECHNIQUE:  Precontrast localizing scans were followed by CT  angiography with an injection of 70mL Isovue-370 IV contrast with  scans through the head and neck.  Images were transferred to a  separate 3-D workstation where multiplanar reformations and 3-D images  were created.  Estimates of carotid stenoses are made relative to the  distal internal carotid artery diameters except as noted. Radiation  dose for this scan was reduced using automated exposure control,  adjustment of the mA and/or kV according to patient size, or iterative  reconstruction technique.    COMPARISON: MR scan dated 1/1/2018. CT end CT angiogram scan dated  12/31/2017    FINDINGS: Estimates of stenosis at the carotid bifurcations are  relative to the distal internal carotids.    ARCH: Extensive atherosclerotic plaque is seen in the arch of the  aorta.    NECK CTA:  Right Carotid:  The right common carotid artery is normal.  The right  carotid bifurcation appears normal.  The right internal carotid artery  is negative.      Left Carotid:  The left common carotid artery is unremarkable.  The  left internal carotid is occluded at the bifurcation. This was also  present on the scan of 12/31/2017..  There is collateral blood flow 2  the terminal segment of the left internal carotid via the posterior  communicating artery.    Vertebrals:  The vertebral arteries are normal.    HEAD CTA:  Anterior Circulation: No occluded vessels are seen. .    Posterior Circulation: There is a moderate stenosis in the P2 segment  of the left posterior cerebral.. This is unchanged compared to  12/31/2017.    MISC:: None      Impression    IMPRESSION:   1. Occluded left internal carotid artery. The occlusion extends from  the bifurcation to the terminal segment of the left internal carotid.  This is also present on the previous scan from 2017.  2. Collateral blood flow to the left middle cerebral artery via the  left posterior communicating artery and anterior communicating artery.  3. No occluded intracranial vessels are identified.  4. Moderate stenosis P2 segment left posterior cerebral. This is  unchanged since 2017.       Treatments provided: monitoring    Family Comments: wife at bedside    OBS brochure/video discussed/provided to patient/family: Yes              Name of person given brochure if not patient:               Relationship to patient:     ED Medications:   Medications   0.9% sodium chloride BOLUS (has no administration in time range)   iopamidol (ISOVUE-370) solution 120 mL (70 mLs Intravenous Given 4/8/19 1023)   100mL Saline Flush (100 mLs Intravenous Given 4/8/19 1023)       Drips infusing?:  No    For the majority of the shift this patient was Green.   Interventions performed were NA.    Severe Sepsis OR Septic Shock Diagnosis Present: No    To be done/followed up on inpatient unit:      ED NURSE PHONE NUMBER: *[phone]

## 2019-04-08 NOTE — PLAN OF CARE
PRIMARY DIAGNOSIS: TIA  OUTPATIENT/OBSERVATION GOALS TO BE MET BEFORE DISCHARGE:  1. Orthostatic performed: No    2. Diagnostic testing complete & at baseline neurologic testing: Yes    3. Cleared by consultants (if involved): N/A    5. Tolerating adequate PO diet and medications: Yes    6. Return to near baseline physical activity or neurologic status: Yes    Discharge Planner Nurse   Safe discharge environment identified: No  Barriers to discharge: Yes       Entered by: Christina Rai 04/08/2019 5:28 PM     Please review provider order for any additional goals.   Nurse to notify provider when observation goals have been met and patient is ready for discharge.

## 2019-04-08 NOTE — ED NOTES
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    History is obtained from patient's wife at bedside. Approximately for the past 5 years patient has had several episodes of sudden decreased consciousness/occasional loss of consciousness with symptoms of aphasia and right-sided weakness which resolved. These are not clearly associated with positional changes. 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 Postprocedure he had an episode where he was slumped over, expressive aphasia and right-sided weakness and EMS was called. Initially in the ER he was minimally verbal and then his symptoms rapidly improved to baseline.    On arrival to ER, stroke code was called, CT head without contrast did not show any acute intracranial abnormality, chronic encephalomalacia noted in the left frontal lobe, CTA head and neck showed stable occluded left ICA from bifurcation to the terminal segment, collateral blood flow to the left MCA was via left Pcom in acomm, intracranial vessels were patent. The perfusion was not done due to presence of chronic left ICA occlusion with altered perfusion. Reexamination after CT scan, his symptoms had resolved.    Acute stroke  therapy  TPA Treatment   Not given due to minor / isolated / quickly resolving stroke symptoms.  Endovascular Treatment  Not initiated due to absence of proximal vessel occlusion    Impression    - Episodes of expressive aphasia and right-sided weakness: Syncope / Suspect perfusion dependent recrudescence of left MCA stroke symptoms. Other differential includes new left MCA infarct from large artery atherosclerosis (left ICA occlusion), focal seizures or TIA.    Recommendations    - Neuro checks Q 4, patient admitted for observation  - Euthermia, Euglycemia  - Continue dual antiplatelets: Aspirin 81 mg plus Plavix 75 mg  - Recommend to check a limited stroke MRI brain to evaluate for acute stroke  - TTE with Bubble Study limited  - 24-hour Telemetry  - Bedside Glucose Monitoring  - A1c, Lipid Panel-continue lipitor 40 mg daily  - Long-term goal normotension  - PT/OT/SLP  - Stroke Education    Patient Follow-up    - final recommendation pending work-up    We will continue to follow along closely, please call us with any questions or concerns.  Case was seen and discussed with Dr. Davis.    Corina Gloria MD  Vascular Neurology fellow  Pager # [phone]  __________________________________________________    Past Medical History:   Diagnosis Date     Aortic root dilatation (H)      Aortic stenosis      Benign essential hypertension 1/6/2017     Benign non-nodular prostatic hyperplasia with lower urinary tract symptoms 10/20/2016     CAD (coronary artery disease)     MI 1970     Cardiomyopathy (H)      Carotid artery stenosis 10/20/2016    chronically occluded left internal carotid artery     Carotid occlusion, left      Coronary artery disease involving native coronary artery of native heart without angina pectoris 10/20/2016     Diabetes mellitus (H)      DJD (degenerative joint disease)      Gastro-oesophageal reflux disease      Gastroesophageal reflux disease without esophagitis 10/20/2016     Heart attack  (H)      Hyperlipidemia      Hypertension      Mild cognitive impairment 10/20/2016     Nephrolithiasis     RECURRENT     Osteopenia      PAD (peripheral artery disease) (H)     peripheral angiogram 5/2017: The common iliac artery stenoses were stented and the superficial femoral artery stenoses were angioplastied     Paroxysmal atrial fibrillation (H)      PONV (postoperative nausea and vomiting)      RBBB with left anterior fascicular block      Unspecified cerebral artery occlusion with cerebral infarction      Past Surgical History:   Procedure Laterality Date     AMPUTATE FOOT Left 6/4/2017    Procedure: AMPUTATE FOOT;  Sun Add#3: partial left foot amputation - Sagital Saw - Mini C-Arm;  Surgeon: Moe Kirk DPM;  Location:  OR     CHOLECYSTECTOMY       ENDARTERECTOMY CAROTID Right 1/3/2018    Procedure: ENDARTERECTOMY CAROTID;  RIGHT CAROTID ENDARTERECTOMY WITH EEG;  Surgeon: Roland Rodriguez MD;  Location:  OR     ESOPHAGOSCOPY, GASTROSCOPY, DUODENOSCOPY (EGD), COMBINED N/A 12/26/2016    Procedure: COMBINED ESOPHAGOSCOPY, GASTROSCOPY, DUODENOSCOPY (EGD);  Surgeon: Nasim Louis MD;  Location:  GI     GENITOURINARY SURGERY      KIDNEY STONE REMOVAL X 4     HC UGI ENDOSCOPY W EUS N/A 12/29/2016    Procedure: COMBINED ENDOSCOPIC ULTRASOUND, ESOPHAGOSCOPY, GASTROSCOPY, DUODENOSCOPY (EGD);  Surgeon: Nasim Louis MD;  Location:  GI     HERNIA REPAIR       INCISION AND DRAINAGE FOOT, COMBINED Left 6/6/2017    Procedure: COMBINED INCISION AND DRAINAGE FOOT;  REVISIONAL LEFT FOOT INCISION AND DRAINAGE WITH POSSIBLE FLAP CLOSURE , ANTIBIOTIC SOLUTION ;  Surgeon: Nabil Ann DPM;  Location:  OR     LASER HOLMIUM LITHOTRIPSY URETER(S), INSERT STENT, COMBINED  3/2/2012    Procedure:COMBINED CYSTOSCOPY, URETEROSCOPY, LASER HOLMIUM LITHOTRIPSY URETER(S), INSERT STENT; CYSTOSCOPY, RIGHT RETROGRADES, RIGHT URETEROSCOPY, HOLMIUM LASER, RIGHT STENT PLACEMENT;  Surgeon:ANJELICA LEÓN; Location: OR     ROTATOR CUFF REPAIR RT/LT         - MEDICATION INSTRUCTIONS -       sodium chloride 75 mL/hr at 04/08/19 1406     Facility-Administered Medications Prior to Admission   Medication Dose Route Frequency Provider Last Rate Last Dose     [DISCONTINUED] lidocaine 1 % injection 1 mL  1 mL INTRA-ARTICULAR Once Matt Morrissey MD         Medication Sig     AMITRIPTYLINE HCL PO Take 25 mg by mouth nightly as needed for sleep     aspirin EC 81 MG EC tablet Take 1 tablet (81 mg) by mouth daily     ATORVASTATIN CALCIUM PO Take 40 mg by mouth At Bedtime      bisacodyl (DULCOLAX) 10 MG Suppository Place 10 mg rectally daily as needed for constipation     clopidogrel (PLAVIX) 75 MG tablet TAKE 1 TABLET BY MOUTH DAILY     DULoxetine (CYMBALTA) 30 MG EC capsule TAKE 1 CAPSULE(30 MG) BY MOUTH DAILY     insulin glargine (BASAGLAR KWIKPEN) 100 UNIT/ML pen Inject 22 Units Subcutaneous At Bedtime      ipratropium (ATROVENT) 0.03 % nasal spray INHALE 1 SPRAY IN EACH NOSTRIL EVERY 12 HOURS AS NEEDED     lisinopril (PRINIVIL/ZESTRIL) 5 MG tablet Take 1 tablet (5 mg) by mouth daily     MAGNESIUM-OXIDE 400 (241.3 Mg) MG tablet TAKE 1 TABLET BY MOUTH TWICE DAILY     metFORMIN (GLUCOPHAGE) 1000 MG tablet TAKE 1 TABLET BY MOUTH TWICE DAILY WITH MEALS     metoprolol succinate ER (TOPROL-XL) 25 MG 24 hr tablet Take 12.5 mg by mouth At Bedtime     nitroGLYcerin (NITROSTAT) 0.4 MG sublingual tablet For chest pain place 1 tablet under the tongue every 5 minutes for 3 doses. If symptoms persist 5 minutes after 1st dose call 911.     omeprazole (PRILOSEC) 40 MG DR capsule TAKE 1 CAPSULE(40 MG) BY MOUTH DAILY 30 TO 60 MINUTES BEFORE A MEAL     polyethylene glycol (MIRALAX/GLYCOLAX) Packet Take 17 g by mouth daily as needed for constipation     tamsulosin (FLOMAX) 0.4 MG capsule Take 0.4 mg by mouth At Bedtime     blood glucose monitoring (NO BRAND SPECIFIED) meter device kit Use to test blood sugar bid  "times daily or as directed.     blood glucose monitoring (TRUE METRIX BLOOD GLUCOSE TEST) test strip USE TO TEST BLOOD SUGAR THREE TIMES DAILY OR AS DIRECTED     order for DME Equipment being ordered: True Matrix Blood Glucose meter.     Allergies   Allergen Reactions     Oxycodone Other (See Comments)     \"TERRIBLE SWEATING\"     Sulfa Drugs      Tetracycline      Family History     Problem (# of Occurrences) Relation (Name,Age of Onset)    Breast Cancer (1) Mother    Heart Failure (1) Father (81)        Social History     Tobacco Use     Smoking status: Former Smoker     Packs/day: 1.00     Years: 30.00     Pack years: 30.00     Types: Cigarettes     Last attempt to quit: 1999     Years since quittin.2     Smokeless tobacco: Never Used   Substance Use Topics     Alcohol use: Yes     Alcohol/week: 4.2 oz     Types: 7 Standard drinks or equivalent per week     Comment: 1 drink per biweekly     Drug use: No     ROS  The 10 point Review of Systems is negative other than noted in the HPI or here.     PHYSICAL EXAMINATION  /56   Pulse 74   Temp 96.5  F (35.8  C) (Oral)   Resp 20   Ht 1.676 m (5' 6\")   Wt 71.8 kg (158 lb 4.8 oz)   SpO2 95%   BMI 25.55 kg/m    General: patient lying in bed without any acute distress    HEENT: normocephalic/atraumatic. Right eye conjunctival hemorrhage present  Cardio: RRR  Pulmonary: no respiratory distress  Abdomen: soft  Extremities: no edema  Skin: intact   Neurologic  Mental Status: fully alert, attentive and oriented, follows commands, speech clear and fluent, slow to respond but can follow appropiately  Cranial Nerves: visual fields intact, PERRL, EOMI with normal smooth pursuit, facial sensation intact and symmetric, facial movements symmetric, tongue protrusion midline, hard of hearing  Motor:  no abnormal movements, normal muscle bulk, minimal drift in RUE and RLE - suspect chronic. Strength is 5/5 the left upper and lower extremity  Reflexes: no " clonus  Sensory:  intact/symmetric to light touch and pin prick throughout upper and lower extremities  Coordination:  FNF and HS intact without dysmetria  Station/Gait:  deferred    NIHSS  Interval and Comments baseline   1a. Level of Consciousness 0-->Alert, keenly responsive   1b. LOC Questions 0-->Answers both questions correctly   1c. LOC Commands 0-->Performs both tasks correctly   2.   Best Gaze 0-->Normal   3.   Visual 0-->No visual loss   4.   Facial Palsy 0-->Normal symmetrical movements   5a. Motor Arm, Left 0-->No drift, limb holds 90 (or 45) degrees for full 10 secs   5b. Motor Arm, Right 1-->Drift, limb holds 90 (or 45) degrees, but drifts down before full 10 secs, does not hit bed or other support   6a. Motor Leg, Left 0-->No drift, leg holds 30 degree position for full 5 secs   6b. Motor Leg, right 1-->Drift, leg falls by the end of the 5-sec period but does not hit bed   7.   Limb Ataxia 0-->Absent   8.   Sensory 0-->Normal, no sensory loss   9.   Best Language 0-->No aphasia, normal   10. Dysarthria 0-->Normal   11. Extinction and Inattention  0-->No abnormality   Total 2     Labs/Imaging  Labs and imaging were reviewed and used in developing plan; pertinent results included.  Data   CBC  WBC (10e9/L)   Date Value   04/08/2019 10.1   06/04/2018 7.5   06/01/2018 7.5    RBC Count (10e12/L)   Date Value   04/08/2019 4.38 (L)   06/04/2018 3.38 (A)   06/01/2018 3.58 (L)    Hemoglobin (g/dL)   Date Value   04/08/2019 12.7 (L)   06/13/2018 10.6 (A)   06/04/2018 9.8 (A)      Hematocrit (%)   Date Value   04/08/2019 40.1   06/04/2018 3.5 (A)   06/01/2018 32.7 (L)    Platelet Count (10e9/L)   Date Value   04/08/2019 179   06/04/2018 180   06/01/2018 155         BMP  Sodium (mmol/L)   Date Value   04/08/2019 138   02/28/2019 141   06/13/2018 139    Potassium (mmol/L)   Date Value   04/08/2019 4.9   02/28/2019 5.1   06/13/2018 4.5    Chloride (mmol/L)   Date Value   04/08/2019 104   02/28/2019 107   06/13/2018  104      Carbon Dioxide (mmol/L)   Date Value   04/08/2019 28   02/28/2019 26   06/13/2018 29    Glucose (mg/dL)   Date Value   04/08/2019 195 (H)   02/28/2019 147 (H)   06/13/2018 129 (A)    Urea Nitrogen (mg/dL)   Date Value   04/08/2019 21   02/28/2019 22   06/13/2018 23      Creatinine (mg/dL)   Date Value   04/08/2019 0.86   02/28/2019 0.87   06/13/2018 0.91    Calcium (mg/dL)   Date Value   04/08/2019 9.0   02/28/2019 9.7   06/13/2018 9.1         INR Troponin I A1C   INR (no units)   Date Value   04/08/2019 1.00   12/31/2017 0.99   01/20/2017 1.03    Troponin I ES (ug/L)   Date Value   06/01/2018 0.093 (H)   06/01/2018 0.093 (H)   06/01/2018 0.133 (HH)    Hemoglobin A1C (%)   Date Value   02/28/2019 8.7 (H)   11/29/2018 8.1 (H)   08/30/2018 8.7 (H)        Liver Panel  Protein Total (g/dL)   Date Value   06/01/2018 6.6 (L)   05/27/2018 5.8 (L)   05/26/2018 7.0    Albumin (g/dL)   Date Value   06/01/2018 2.9 (L)   05/27/2018 2.9 (L)   05/26/2018 3.5    Bilirubin Total (mg/dL)   Date Value   06/01/2018 0.6   05/27/2018 0.3   05/26/2018 0.3      Alkaline Phosphatase (U/L)   Date Value   06/01/2018 67   05/27/2018 60   05/26/2018 69    AST (U/L)   Date Value   06/01/2018 15   05/27/2018 18   05/26/2018 20    ALT (U/L)   Date Value   06/01/2018 14   05/27/2018 15   05/26/2018 18      No results found for: BILIDIRECT       Lipid Profile  Cholesterol (mg/dL)   Date Value   12/31/2017 133   12/01/2017 142   01/21/2017 165    HDL Cholesterol (mg/dL)   Date Value   12/31/2017 43   12/01/2017 66   01/21/2017 48    LDL Cholesterol Calculated (mg/dL)   Date Value   12/31/2017 61   12/01/2017 39   01/21/2017 87     LDL Cholesterol Direct (mg/dL)   Date Value   02/28/2019 87      Triglycerides (mg/dL)   Date Value   12/31/2017 147   12/01/2017 186 (H)   01/21/2017 148    Cholesterol/HDL Ratio (no units)   Date Value   12/18/2015 5.1 (H)   12/05/2014 3.3   11/22/2013 3.7          Stroke Code Data  (for stroke code without  tele)  Stroke code activated 04/08/19   1017   First stroke provider response 04/08/19   1020   Last known normal 04/08/19   0945   Time of discovery   (or onset of symptoms) 04/08/19   0945   Head CT read by me 04/08/19   1028   Was stroke code de-escalated? Yes 04/08/19 1045  other (see comments) Mild symptoms, quickly resolving

## 2019-04-08 NOTE — PHARMACY-ADMISSION MEDICATION HISTORY
Admission medication history interview status for the 4/8/2019  admission is complete. See EPIC admission navigator for prior to admission medications     Medication history source reliability:Good    Actions taken by pharmacist (provider contacted, etc):None     Additional medication history information not noted on PTA med list :None    Medication reconciliation/reorder completed by provider prior to medication history? No    Time spent in this activity: 30 minutes    Prior to Admission medications    Medication Sig Last Dose Taking? Auth Provider   AMITRIPTYLINE HCL PO Take 25 mg by mouth nightly as needed for sleep Past Month at Unknown time Yes Unknown, Entered By History   aspirin EC 81 MG EC tablet Take 1 tablet (81 mg) by mouth daily 4/8/2019 at am Yes Tra Reynoso MD   ATORVASTATIN CALCIUM PO Take 40 mg by mouth At Bedtime  4/7/2019 at pm Yes Reported, Patient   bisacodyl (DULCOLAX) 10 MG Suppository Place 10 mg rectally daily as needed for constipation Past Month at Unknown time Yes Reported, Patient   clopidogrel (PLAVIX) 75 MG tablet TAKE 1 TABLET BY MOUTH DAILY 4/8/2019 at am Yes Matt Morrissey MD   DULoxetine (CYMBALTA) 30 MG EC capsule TAKE 1 CAPSULE(30 MG) BY MOUTH DAILY 4/8/2019 at am Yes Matt Morrissey MD   insulin glargine (BASAGLAR KWIKPEN) 100 UNIT/ML pen Inject 22 Units Subcutaneous At Bedtime  4/7/2019 at pm Yes Matt Morrissey MD   ipratropium (ATROVENT) 0.03 % nasal spray INHALE 1 SPRAY IN EACH NOSTRIL EVERY 12 HOURS AS NEEDED Past Month at Unknown time Yes Matt Morrissey MD   lisinopril (PRINIVIL/ZESTRIL) 5 MG tablet Take 1 tablet (5 mg) by mouth daily 4/8/2019 at am Yes Brandyn Graves PA   MAGNESIUM-OXIDE 400 (241.3 Mg) MG tablet TAKE 1 TABLET BY MOUTH TWICE DAILY 4/8/2019 at am Yes Matt Morrissey MD   metFORMIN (GLUCOPHAGE) 1000 MG tablet TAKE 1 TABLET BY MOUTH TWICE DAILY WITH MEALS 4/8/2019 at am Yes Matt Morrissey MD   metoprolol succinate ER  (TOPROL-XL) 25 MG 24 hr tablet Take 12.5 mg by mouth At Bedtime 4/7/2019 at pm Yes Unknown, Entered By History   nitroGLYcerin (NITROSTAT) 0.4 MG sublingual tablet For chest pain place 1 tablet under the tongue every 5 minutes for 3 doses. If symptoms persist 5 minutes after 1st dose call 911.  Yes Brandyn Graves PA   omeprazole (PRILOSEC) 40 MG DR capsule TAKE 1 CAPSULE(40 MG) BY MOUTH DAILY 30 TO 60 MINUTES BEFORE A MEAL 4/8/2019 at am Yes Matt Morrissey MD   polyethylene glycol (MIRALAX/GLYCOLAX) Packet Take 17 g by mouth daily as needed for constipation  Yes Reported, Patient   tamsulosin (FLOMAX) 0.4 MG capsule Take 0.4 mg by mouth At Bedtime 4/7/2019 at pm Yes Unknown, Entered By History   blood glucose monitoring (NO BRAND SPECIFIED) meter device kit Use to test blood sugar bid times daily or as directed.   Matt Morrissey MD   blood glucose monitoring (TRUE METRIX BLOOD GLUCOSE TEST) test strip USE TO TEST BLOOD SUGAR THREE TIMES DAILY OR AS DIRECTED   Matt Morrissey MD   order for DME Equipment being ordered: True Matrix Blood Glucose meter.   Matt Morrissey MD

## 2019-04-08 NOTE — ED NOTES
Bed: ST01  Expected date:   Expected time:   Means of arrival:   Comments:  [facility] - 514 - 89 M code stroke alert eta 1014

## 2019-04-08 NOTE — TELEPHONE ENCOUNTER
Reason for call:  Other   Patient called regarding (reason for call): call back  Additional comments: Dr.John Burroughs (retina specialist) called to speak with Dr.Liston edwards about pt. needed to hang up and requested call back asap at [phone]

## 2019-04-08 NOTE — PLAN OF CARE
PRIMARY DIAGNOSIS: SYNCOPE/TIA  OUTPATIENT/OBSERVATION GOALS TO BE MET BEFORE DISCHARGE:  1. Orthostatic performed: N/A    2. Diagnostic testing complete & at baseline neurologic testing: No    3. Cleared by consultants (if involved): No    4. Interpretation of cardiac rhythm per telemetry tech: SR with BBB     5. Tolerating adequate PO diet and medications: Yes    6. Return to near baseline physical activity or neurologic status: No    Discharge Planner Nurse   Safe discharge environment identified: No  Barriers to discharge: Yes       Entered by: Hedy Quintanilla 04/08/2019 1:20 PM     Please review provider order for any additional goals.   Nurse to notify provider when observation goals have been met and patient is ready for discharge.

## 2019-04-08 NOTE — PLAN OF CARE
OT: orders received on 89yo male admitted under observation status. Attempted to see however patient with another discipline for assessment.

## 2019-04-08 NOTE — H&P
Admitted:     04/08/2019      PRIMARY CARE PHYSICIAN:  Matt Morrissey MD.       CHIEF COMPLAINT:  Speech difficulty and right-sided weakness.      HISTORY OF PRESENT ILLNESS:  Dustin Pearce is a pleasant 89-year-old male with a past medical history of CVA, coronary artery disease, cardiomyopathy, left carotid occlusion, hyperlipidemia, peripheral arterial disease, paroxysmal atrial fibrillation and dementia who presented to the Emergency Department with his wife due to weakness and speech difficulty.  Much of the history is provided by the patient's wife as the patient himself is a limited historian.  The patient went to see his eye doctor today for an injection and once the procedure was over, he stated that he was not feeling well.  He and his wife sat down in the waiting room where he was witnessed to have speech difficulties.  Per his wife, she stated he was trying to speak, but was unable to get the words out.  He did close his eyes for a brief period of time, but it was not thought that he lost consciousness.  He continued to have speech difficulties and was evaluated by the physician at the clinic.  He was also noted to have some right-sided weakness.  EMS was alerted and he was brought to the Emergency Department for evaluation.  His wife states that he had a stroke in 1999 with very similar presentation.  She states that he has not recently been ill and that he was complaining of a headache earlier this morning, which is not common for him.  The patient himself denies any memory of chest pain, palpitations, shortness of breath, fevers, chills, lightheadedness, abdominal pain, nausea, vomiting, diarrhea or change in appetite.        In the Emergency Department, the patient was evaluated by Dr. Bliss.  A code stroke was called.  He had a noncontrast head CT that showed no acute pathology and chronic encephalomalacia of the left frontal lobe which is unchanged from previous.  CT angiogram of the head and  neck showed an occluded left internal carotid artery which was present on the previous scan from 2017.  There were no occluded intracranial vessels identified.  Laboratory showed a glucose of 195, creatinine 0.86, WBC 10.1, hemoglobin 12.7, platelet count 179, INR 1.0.  Dr. Davis of Neurology evaluated the patient.  He is now being registered under Observation due to concern for TIA.  In the Emergency Department, his symptoms have now completely resolved and he is back to his baseline.      PAST MEDICAL HISTORY:   1.  Aortic root dilatation.   2.  Aortic stenosis.   3.  Hypertension.   4.  BPH.   5.  Coronary artery disease.   6.  Cardiomyopathy.   7.  Carotid artery stenosis with chronic left occlusion.   8.  Diabetes.   9.  DJD.   10.  GERD.   11.  MI.   12.  Hyperlipidemia.   13.  Dementia.   14.  Nephrolithiasis.   15.  Peripheral arterial disease.   16.  Paroxysmal atrial fibrillation.   17.  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   18.  CVA in , no residual deficits.      PAST SURGICAL HISTORY:   1.  Partial left foot amputation.   2.  Cholecystectomy.   3.  Carotid endarterectomy, right.   4.  EGD.   5.   surgery.   6.  Hernia repair.   7.  Laser holmium lithotripsy and ureter stent placement.   8.  Rotator cuff repair.      FAMILY HISTORY:  This was reviewed with the patient.  Mother with breast cancer and .  Father had heart failure and .  Otherwise reviewed and noncontributory.      PRIOR TO ADMISSION MEDICATIONS:    Facility-Administered Medications Prior to Admission   Medication Dose Route Frequency Provider Last Rate Last Dose     [DISCONTINUED] lidocaine 1 % injection 1 mL  1 mL INTRA-ARTICULAR Once Matt Morrissey MD         Medications Prior to Admission   Medication Sig Dispense Refill Last Dose     AMITRIPTYLINE HCL PO Take 25 mg by mouth nightly as needed for sleep   Past Month at Unknown time     aspirin EC 81 MG EC tablet Take 1 tablet (81 mg) by mouth  daily 30 tablet 0 4/8/2019 at am     ATORVASTATIN CALCIUM PO Take 40 mg by mouth At Bedtime    4/7/2019 at pm     bisacodyl (DULCOLAX) 10 MG Suppository Place 10 mg rectally daily as needed for constipation   Past Month at Unknown time     clopidogrel (PLAVIX) 75 MG tablet TAKE 1 TABLET BY MOUTH DAILY 90 tablet 2 4/8/2019 at am     DULoxetine (CYMBALTA) 30 MG EC capsule TAKE 1 CAPSULE(30 MG) BY MOUTH DAILY 90 capsule 3 4/8/2019 at am     insulin glargine (BASAGLAR KWIKPEN) 100 UNIT/ML pen Inject 22 Units Subcutaneous At Bedtime  15 mL 11 4/7/2019 at pm     ipratropium (ATROVENT) 0.03 % nasal spray INHALE 1 SPRAY IN EACH NOSTRIL EVERY 12 HOURS AS NEEDED 30 mL 0 Past Month at Unknown time     lisinopril (PRINIVIL/ZESTRIL) 5 MG tablet Take 1 tablet (5 mg) by mouth daily 30 tablet 1 4/8/2019 at am     MAGNESIUM-OXIDE 400 (241.3 Mg) MG tablet TAKE 1 TABLET BY MOUTH TWICE DAILY 180 tablet 0 4/8/2019 at am     metFORMIN (GLUCOPHAGE) 1000 MG tablet TAKE 1 TABLET BY MOUTH TWICE DAILY WITH MEALS 180 tablet 3 4/8/2019 at am     metoprolol succinate ER (TOPROL-XL) 25 MG 24 hr tablet Take 12.5 mg by mouth At Bedtime   4/7/2019 at pm     nitroGLYcerin (NITROSTAT) 0.4 MG sublingual tablet For chest pain place 1 tablet under the tongue every 5 minutes for 3 doses. If symptoms persist 5 minutes after 1st dose call 911. 25 tablet 0 Taking     omeprazole (PRILOSEC) 40 MG DR capsule TAKE 1 CAPSULE(40 MG) BY MOUTH DAILY 30 TO 60 MINUTES BEFORE A MEAL 90 capsule 3 4/8/2019 at am     polyethylene glycol (MIRALAX/GLYCOLAX) Packet Take 17 g by mouth daily as needed for constipation   Taking     tamsulosin (FLOMAX) 0.4 MG capsule Take 0.4 mg by mouth At Bedtime   4/7/2019 at pm     blood glucose monitoring (NO BRAND SPECIFIED) meter device kit Use to test blood sugar bid times daily or as directed. 1 kit 0 Taking     blood glucose monitoring (TRUE METRIX BLOOD GLUCOSE TEST) test strip USE TO TEST BLOOD SUGAR THREE TIMES DAILY OR AS DIRECTED  300 strip 1 Taking     order for DME Equipment being ordered: True Matrix Blood Glucose meter. 1 Act 11 Taking     ALLERGIES:  OXYCODONE, SULFA DRUGS, TETRACYCLINE.      SOCIAL HISTORY:  The patient is .  He is a former smoker and quit in January 1999.  He has a 30-pack-year smoking history.  He drinks alcohol seldomly.  No illicit drug use.      REVIEW OF SYSTEMS:  A complete 10-point review of systems was performed and is negative other than the items previously mentioned above in HPI.      PHYSICAL EXAMINATION:   VITAL SIGNS:  Blood pressure 147/62, heart rate 72 beats per minute, temperature 97.5, respiratory rate 18, oxygen saturation 96% on room air.   GENERAL:  The patient is alert, oriented to person, place and situation, cooperative, lying on the gurney in no apparent distress.   HEENT:  Pupils equal, round and reactive to light, extraocular movements intact.   HEENT:  Head normocephalic, atraumatic.  Throat, lips, mucosa and tongue appear moist.   NECK:  Supple.   CARDIOVASCULAR:  Heart regular rate and rhythm, grade 2/6 systolic murmur.  Distal pulses are intact.  No edema.   PULMONARY:  Lungs are clear to auscultation bilaterally, no crackles, wheezes or rhonchi.  Breathing is nonlabored.   GASTROINTESTINAL:  Abdomen soft, nontender, nondistended with normoactive bowel sounds.   MUSCULOSKELETAL:  The patient moves all 4 extremities equally.  Limbs are atraumatic.   NEUROLOGIC:  Alert.  Cranial nerves II-XII are intact and symmetric.  Motor function is intact.  Strength 5/5 in all 4 extremities.  No pronator drift.  Sensation intact.  Speech normal.  Response time is a bit slowed.   SKIN:  Warm, dry, nondiaphoretic.  No rashes seen on exposed skin.   PSYCHIATRIC:  Normal mood and affect.      LABORATORY DATA:  CBC:  WBC 10.1, hemoglobin 12.7, hematocrit 40.1, platelet count 179, RBC 4.3, and otherwise within normal limits.  BMP:  Potassium 4.9, creatinine 0.86, glucose 195, otherwise within normal  limits.  INR 1.00, PTT 29.      IMAGING:   CTA Angiogram Head and Neck:  1. Occluded left internal carotid artery. The occlusion extends from  the bifurcation to the terminal segment of the left internal carotid.  This is also present on the previous scan from 2017.  2. Collateral blood flow to the left middle cerebral artery via the  left posterior communicating artery and anterior communicating artery.  3. No occluded intracranial vessels are identified.  4. Moderate stenosis P2 segment left posterior cerebral. This is  unchanged since 2017.  As read by Radiology.     CT-scan Head w/o contrast:  1. No acute pathology. No bleed, mass, or areas of acute infarction  are identified.  2. Chronic encephalomalacia is again noted in the left frontal lobe.  This is unchanged.  3. There is diffuse parenchymal volume loss.  White matter changes are  present in the cerebral hemispheres that are consistent with small  vessel ischemic disease in this age patient.  Result per radiology.      EKG:  Personally reviewed.  Ventricular rate 71 beats per minute, normal sinus rhythm.  Left axis deviation.  Right bundle branch block.      ASSESSMENT AND PLAN:  Dustin Pearce is a pleasant 89-year-old male with a past medical history of CVA, paroxysmal atrial fibrillation, hypertension, hyperlipidemia, carotid artery disease, peripheral arterial disease, coronary artery disease and cardiomyopathy who presents to the Emergency Department with speech difficulty and right-sided weakness.  His initial workup is negative for stroke, but his symptoms are concerning for TIA.  He is registered to Observation for further monitoring and neurology evaluation.     1.  Aphasia and right-sided weakness:  The patient had an eye appointment this morning and after the procedure, he was complaining of a headache and had sudden onset of difficulty with his speech and some right-sided weakness.  His symptoms largely resolved by the time he came to the  Emergency Department.  Symptoms were reminiscent of his previous CVA in 1999 per the wife.  Code stroke was initiated in the Emergency Department.  CT head without contrast shows no acute pathology and chronic encephalomalacia of the left frontal lobe which is unchanged from previous.  CTA of the head and neck shows occluded left internal carotid artery which was present on previous scan as well as moderate stenosis of the P2 segment left posterior cerebral artery, unchanged from previous.  No occluded intracranial vessels are identified.  EKG shows normal sinus rhythm.  Lab work including CBC, BMP and INR are unremarkable other than a glucose of 195.  The patient will be registered to Observation.  We will monitor on telemetry.  Continue prior to admission aspirin and Plavix.  Allow permissive hypertension.  Neurology has been formally consulted.  Will obtain an echocardiogram and brain MRI.  Neuro checks.  PT, OT and Speech Therapy to evaluate.     2.  Paroxysmal atrial fibrillation:  The patient has a very elevated CHADS2-VASc score for age, CHF history, hypertension, CVA history, vascular disease and diabetes.  He is on aspirin and Plavix, but not otherwise on a anticoagulant.  This will need to be discussed further during this hospitalization.  Neurology consulted.  He is currently in sinus rhythm with controlled rate.  Will continue with prior to admission metoprolol 12.5 mg at bedtime.     3.  Coronary artery disease with history of myocardial infarction, cardiomyopathy with ejection fraction of 35-40%, hypertension, hyperlipidemia:  The patient currently denies any chest pain or shortness of breath.  EKG shows sinus rhythm with right bundle branch block which is seen on a previous EKG.  Will monitor on telemetry.  We will obtain an echocardiogram.  Monitor intake and output and daily weights.  Allow permissive hypertension, but will have p.r.n. hydralazine and labetalol per stroke protocol.  Otherwise,  continue with PTA lisinopril and metoprolol with holding parameters.  Continue with aspirin and Plavix.  We will reorder statin.  Fasting lipid panel for a.m.     4.  Type 2 diabetes mellitus:  Prior to admission regimen is metformin 1000 mg b.i.d. and Basaglar 22 units at bedtime, which was recently increased from 18 units.  Will order prior to admission Basaglar 22 units at bedtime in anticipation that the patient will be cleared for a carbohydrate-controlled diet.  Hold metformin.  We will put on moderate dose sliding scale NovoLog four times a day with Accu-Cheks.  Recent hemoglobin A1c is 8.7%.     5.  Carotid artery disease and peripheral arterial disease:  Again, continue aspirin and Plavix.  He has been seen by Vascular Surgery regarding his left carotid artery occlusion and does have a history of right carotid endarterectomy.  Consider reconsulting vascular pending his MRI and stroke workup.     6.  Dementia:  The patient is at baseline per his wife.  Monitor for delirium.     7.  Benign prostatic hypertrophy:  Continue with prior to admission Flomax.     8.  Deep venous thrombosis prophylaxis:  This will be with PCDs.      CODE STATUS:  The patient is full code, confirmed with him and his wife on admission.      DISPOSITION:  Observation status.  Anticipating discharge in the next 1-2 days pending his Neurology workup and clinical stability.      This [facility].  He is in agreement with my assessment and plan of care.         BRONSON WHITAKER MD       As dictated by GAGE LEWIS PA-C            D: 2019   T: 2019   MT:       Name:     SID AGUIAR   MRN:      [id number]        Account:      [id number]   :      1928        Admitted:     2019                   Document: [id number]       cc: Matt Morrissey MD

## 2019-04-08 NOTE — PROGRESS NOTES
RECEIVING UNIT ED HANDOFF REVIEW    ED Nurse Handoff Report was reviewed by: Deborah Siddiqi on April 8, 2019 at 12:17 PM

## 2019-04-09 ENCOUNTER — APPOINTMENT (OUTPATIENT)
Dept: PHYSICAL THERAPY | Facility: CLINIC | Age: 84
End: 2019-04-09
Attending: PHYSICIAN ASSISTANT
Payer: MEDICARE

## 2019-04-09 VITALS
HEART RATE: 89 BPM | TEMPERATURE: 97.1 F | RESPIRATION RATE: 18 BRPM | BODY MASS INDEX: 26.34 KG/M2 | SYSTOLIC BLOOD PRESSURE: 146 MMHG | WEIGHT: 163.9 LBS | OXYGEN SATURATION: 95 % | HEIGHT: 66 IN | DIASTOLIC BLOOD PRESSURE: 52 MMHG

## 2019-04-09 LAB
CHOLEST SERPL-MCNC: 134 MG/DL
GLUCOSE BLDC GLUCOMTR-MCNC: 171 MG/DL (ref 70–99)
GLUCOSE BLDC GLUCOMTR-MCNC: 176 MG/DL (ref 70–99)
GLUCOSE BLDC GLUCOMTR-MCNC: 192 MG/DL (ref 70–99)
HDLC SERPL-MCNC: 47 MG/DL
LDLC SERPL CALC-MCNC: 64 MG/DL
NONHDLC SERPL-MCNC: 87 MG/DL
TRIGL SERPL-MCNC: 113 MG/DL

## 2019-04-09 PROCEDURE — 40000895 ZZH STATISTIC SLP IP EVAL DEFER: Performed by: SPEECH-LANGUAGE PATHOLOGIST

## 2019-04-09 PROCEDURE — 97161 PT EVAL LOW COMPLEX 20 MIN: CPT | Mod: GP

## 2019-04-09 PROCEDURE — 25800030 ZZH RX IP 258 OP 636: Performed by: PHYSICIAN ASSISTANT

## 2019-04-09 PROCEDURE — 99214 OFFICE O/P EST MOD 30 MIN: CPT | Performed by: PSYCHIATRY & NEUROLOGY

## 2019-04-09 PROCEDURE — 36415 COLL VENOUS BLD VENIPUNCTURE: CPT | Performed by: PHYSICIAN ASSISTANT

## 2019-04-09 PROCEDURE — 95816 EEG AWAKE AND DROWSY: CPT

## 2019-04-09 PROCEDURE — 99207 ZZC CDG-CODE CATEGORY CHANGED: CPT | Performed by: PHYSICIAN ASSISTANT

## 2019-04-09 PROCEDURE — A9270 NON-COVERED ITEM OR SERVICE: HCPCS | Mod: GY | Performed by: PHYSICIAN ASSISTANT

## 2019-04-09 PROCEDURE — 96372 THER/PROPH/DIAG INJ SC/IM: CPT

## 2019-04-09 PROCEDURE — 25000132 ZZH RX MED GY IP 250 OP 250 PS 637: Mod: GY | Performed by: PHYSICIAN ASSISTANT

## 2019-04-09 PROCEDURE — G0378 HOSPITAL OBSERVATION PER HR: HCPCS

## 2019-04-09 PROCEDURE — 80061 LIPID PANEL: CPT | Performed by: PHYSICIAN ASSISTANT

## 2019-04-09 PROCEDURE — 00000146 ZZHCL STATISTIC GLUCOSE BY METER IP

## 2019-04-09 PROCEDURE — 99217 ZZC OBSERVATION CARE DISCHARGE: CPT | Performed by: PHYSICIAN ASSISTANT

## 2019-04-09 RX ADMIN — DULOXETINE HYDROCHLORIDE 30 MG: 30 CAPSULE, DELAYED RELEASE ORAL at 08:40

## 2019-04-09 RX ADMIN — OMEPRAZOLE 40 MG: 20 CAPSULE, DELAYED RELEASE ORAL at 08:39

## 2019-04-09 RX ADMIN — INSULIN ASPART 2 UNITS: 100 INJECTION, SOLUTION INTRAVENOUS; SUBCUTANEOUS at 13:01

## 2019-04-09 RX ADMIN — SODIUM CHLORIDE: 9 INJECTION, SOLUTION INTRAVENOUS at 13:01

## 2019-04-09 RX ADMIN — CLOPIDOGREL BISULFATE 75 MG: 75 TABLET, FILM COATED ORAL at 08:40

## 2019-04-09 RX ADMIN — INSULIN ASPART 1 UNITS: 100 INJECTION, SOLUTION INTRAVENOUS; SUBCUTANEOUS at 08:49

## 2019-04-09 RX ADMIN — LISINOPRIL 5 MG: 5 TABLET ORAL at 08:40

## 2019-04-09 RX ADMIN — ASPIRIN 81 MG: 81 TABLET, COATED ORAL at 08:40

## 2019-04-09 ASSESSMENT — MIFFLIN-ST. JEOR: SCORE: 1341.2

## 2019-04-09 NOTE — PROGRESS NOTES
"[facility]    Vascular Neurology Progress Note    Name: Dustin Pearce  YOB: 1928  MRN: [id number]  Admission Date: 4/8/2019  Date of Service:04/09/2019   Hospital Day: 2                                             24 hour event summary:                                         No acute events overnight  Patient is back to baseline  MRI brain did not show any evidence of acute infarct    Physical Examination:     /63 (BP Location: Right arm)   Pulse 89   Temp 97.2  F (36.2  C) (Oral)   Resp 18   Ht 1.676 m (5' 6\")   Wt 74.3 kg (163 lb 14.4 oz)   SpO2 96%   BMI 26.45 kg/m      General: patient lying in bed without any acute distress    HEENT: normocephalic/atraumatic. Right eye conjunctival hemorrhage present  Cardio: RRR  Pulmonary: no respiratory distress  Abdomen: soft  Extremities: no edema  Skin: intact   Neurologic  Mental Status: fully alert, attentive and oriented, follows commands, speech clear and fluent, slow to respond but can follow appropiately  Cranial Nerves: visual fields intact, PERRL, EOMI with normal smooth pursuit, facial sensation intact and symmetric, facial movements symmetric, tongue protrusion midline, hard of hearing  Motor: no abnormal movements, normal muscle bulk, minimal drift in RUE and RLE - suspect chronic. Strength is 5/5 the left upper and lower extremity  Reflexes: no clonus  Sensory:  intact/symmetric to light touch and pin prick throughout upper and lower extremities  Coordination:  FNF and HS intact without dysmetria  Station/Gait:  deferred    Medications:     Scheduled Meds:    aspirin  81 mg Oral Daily     atorvastatin  40 mg Oral At Bedtime     clopidogrel  75 mg Oral Daily     DULoxetine  30 mg Oral Daily     insulin aspart  1-7 Units Subcutaneous TID AC     insulin aspart  1-5 Units Subcutaneous At Bedtime     insulin glargine  22 Units Subcutaneous At Bedtime     lisinopril  5 mg Oral " Daily     metoprolol succinate ER  12.5 mg Oral At Bedtime     omeprazole  40 mg Oral QAM     tamsulosin  0.4 mg Oral At Bedtime     PRN Meds: acetaminophen, acetaminophen, glucose **OR** dextrose **OR** glucagon, hydrALAZINE, labetalol, - MEDICATION INSTRUCTIONS -, melatonin, naloxone, ondansetron **OR** ondansetron, polyethylene glycol, prochlorperazine **OR** prochlorperazine **OR** prochlorperazine, senna-docusate **OR** senna-docusate    Data:     Recent Labs   Lab Test 04/08/19  1019 06/13/18 06/04/18 06/03/18  0520 06/01/18  0810 05/31/18  0600 05/30/18  0540 05/29/18  0635   WBC 10.1  --  7.5  --  7.5 8.5 9.1 7.5   RBC 4.38*  --  3.38*  --  3.58* 3.63* 3.82* 3.84*   HGB 12.7* 10.6* 9.8* 9.8* 10.5* 10.5* 11.0* 11.1*   HCT 40.1  --  3.5*  --  32.7* 33.1* 34.4* 35.3*     --  180  --  155 148* 166 165     Basic Metabolic Panel:  Recent Labs   Lab Test 04/08/19  1019 02/28/19  1913 06/13/18 06/04/18 06/03/18  0520 06/01/18  0810    141 139 138 139 142   POTASSIUM 4.9 5.1 4.5 4.6 3.8 4.1   CHLORIDE 104 107 104 103 105 106   CO2 28 26 29 27 27 29   BUN 21 22 23 19 13 15   CR 0.86 0.87 0.91 1.17 0.82 0.70   * 147* 129* 247* 145* 224*   ALLISON 9.0 9.7 9.1 8.7 8.4* 9.1     Liver panel:  Recent Labs   Lab Test 06/01/18  0810 05/27/18  0710 05/26/18  2035 02/07/18  2341 12/27/16  0650   PROTTOTAL 6.6* 5.8* 7.0 7.6 6.5*   ALBUMIN 2.9* 2.9* 3.5 4.0 2.8*   BILITOTAL 0.6 0.3 0.3 0.6 0.7   ALKPHOS 67 60 69 81 90   AST 15 18 20 18 43   ALT 14 15 18 30 50     INR  Recent Labs   Lab Test 04/08/19  1019 12/31/17  1130 01/20/17  1030   INR 1.00 0.99 1.03      Lipid Profile:  Recent Labs   Lab Test 04/09/19  0626 02/28/19  1913 12/31/17  1425  12/18/15 12/05/14   CHOL 134  --  133   < > 198 160   HDL 47  --  43   < > 39* 48   LDL 64 87 61   < > 123 82   TRIG 113  --  147   < > 181* 149   CHOLHDLRATIO  --   --   --   --  5.1* 3.3    < > = values in this interval not displayed.     A1C:   Recent Labs   Lab Test  02/28/19  1913 11/29/18  1918 08/30/18  1851 05/27/18  0710 04/11/18  1629   A1C 8.7* 8.1* 8.7* 9.1* 7.6*     Troponin I:   Recent Labs   Lab Test 06/01/18  1940 06/01/18  1605 06/01/18  1015 06/01/18  0810 05/29/18  0635 05/29/18  0320 05/28/18  2310 05/28/18  0155   TROPI 0.093* 0.093* 0.133* 0.130* 0.651* 0.707* 0.542* 1.839*     Imaging/ workup:     CT head without contrast did not show any acute intracranial abnormality, chronic encephalomalacia noted in the left frontal lobe  CTA head and neck showed stable occluded left ICA from bifurcation to the terminal segment, collateral blood flow to the left MCA was via left Pcom in acomm, intracranial vessels were patent.     MRI brain: Diffuse cerebral volume loss and cerebral white matter changes consistent with chronic small vessel ischemic disease. Chronic left frontal infarct again noted without change. No evidence for acute intracranial pathology.    TTE:  The left ventricle is normal in size.  There is moderate concentric left ventricular hypertrophy.  The visual ejection fraction is estimated at 45-50%.  There is severe inferior and inferolateral wall hypokinesis.  Mild valvular aortic stenosis. There is severe mitral annular calcification.  Compared to the previous study, the EF appears slightly better but WMA are the  same. No change in valvular disease.    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    Impression     - Episodes of  expressive aphasia and right-sided weakness: Syncope / Suspect perfusion dependent recrudescence of left MCA stroke symptoms.    Recommendations     - Neuro checks Q 4, patient admitted for observation  - Continue dual antiplatelets: Aspirin 81 mg plus Plavix 75 mg  - Orthostatic blood pressures to be checked  - Continue lipitor 40 mg daily  - Long-term goal normotension  - PT/OT/SLP  - Stroke Education    Follow up   - PCP in 1-2 weeks  - Neurology Dr Padilla in 4-6 weeks  - Cardiology follow up in 3-4 weeks for ongoing syncope workup and consideration of long term rhythm monitoring     Can be discharged with close outpatient follow up. Plan discussed with patient and wife who expressed understanding. Case was seen and discussed with Dr. Ryan Gloria MD  Vascular Neurology fellow  Pager # [phone]

## 2019-04-09 NOTE — PROGRESS NOTES
Diagnostic tests and consults completed and resulted. met. EEG performed this AM. Neuro consulted.    Vital signs normal or at patient baseline MET    returns to baseline functional status Partially met, unsteady at times.    Safe disposition plan has been identified MET

## 2019-04-09 NOTE — PROGRESS NOTES
Diagnostic tests and consults completed and resulted Partially met. EEG performed this AM.     Vital signs normal or at patient baseline MET    returns to baseline functional status Partially met, unsteady at times.    Safe disposition plan has been identified NOT MET

## 2019-04-09 NOTE — PLAN OF CARE
PRIMARY DIAGNOSIS: TIA  OUTPATIENT/OBSERVATION GOALS TO BE MET BEFORE DISCHARGE:  1. Orthostatic performed: No     2. Diagnostic testing complete & at baseline neurologic testing: Yes     3. Cleared by consultants (if involved): N/A    4. NSR with BBB     5. Tolerating adequate PO diet and medications: Yes     6. Return to near baseline physical activity or neurologic status: Yes     Discharge Planner Nurse   Safe discharge environment identified: No  Barriers to discharge: Yes

## 2019-04-09 NOTE — PLAN OF CARE
Discharge Planner OT   Patient plan for discharge: N/A  Current status: Orders rec'd and chart reviewed.  Pt admitted under observation status with R sided weakness and speech difficulty, all of which have resolved. Prior to admit pt was living with his significant other Halina in a one story townhouse, uses a cane, is independent with mobility and most ADLs and Halina assists with the rest, does the driving, cooking, cleaning.  Pt at baseline with ADL's, pts SO, Halina does all the household chores, med mgmt, driving, cooking, etc. No OT needs warranted and will complete OT orders.   Barriers to return to prior living situation: None   Recommendations for discharge: home with Halina ARDNO to resume A with IADL's ie med mgmt, driving, etc.   Rationale for recommendations: pt at baseline with ADL's and SO to resume with A with IADL's as above. OT Orders completed.        Entered by: Jacque Kim 04/09/2019 1:57 PM

## 2019-04-09 NOTE — PROGRESS NOTES
[facility] EEG #  PORTABLE # , ordered by Corina Gloria MD.   Pt is awake, drowsy.   No activations.

## 2019-04-09 NOTE — PLAN OF CARE
PT:  Discharge Planner PT   Patient plan for discharge: Return home  Current status: Orders received, eval completed. Pt admitted under observation status with R sided weakness and speech difficulty, all of which have resolved. Prior to admit pt was living with his significant other Halina in a one story townhouse, uses a cane, is independent with mobility and most ADLs and Halina assists with the rest, does the driving, cooking, cleaning. Currently requires SBA for gait of 250 ft with SEC with mild dec in balance but pt and significant other state this is baseline for patient and she holds onto him when they go out in public for safety. Pt is independent with bed mobility and transfers.   Barriers to return to prior living situation: None  Recommendations for discharge: Return home with significant other Halina  Rationale for recommendations: Pt is back to baseline with mobility and ADLs and is safe to return home with his significant other. Pt has no skilled PT or OT needs at this time.       Entered by: Meseret Yarbrough 04/09/2019 10:43 AM

## 2019-04-09 NOTE — PROCEDURES
[facility] EEG #:       ELECTROENCEPHALOGRAM       ORDERING PROVIDER:  Regan Davis M.D.      DATE OF RECORDIN2019      DURATION OF RECORDIN minutes      CLINICAL SUMMARY:  This portable routine EEG recording was performed in evaluation of encephalopathy in Sid Aguiar, using 23 scalp electrodes placed in the 10-20 system.  He was reported to be receiving duloxetine and amitriptyline at the time of this recording.      EEG ACTIVITIES DURING WAKING:  During continuous waking, there was no evidence of a posterior dominant rhythm.  There was ongoing synchronous and asynchronous generalized 2-8 Hz delta-theta slowing of moderate amplitude.  Overall, there was a predominance of delta slowing over the left hemisphere compared with the right.      No interictal epileptiform abnormalities and no electrographic seizures were recorded.      IMPRESSION:  This was an abnormal awake EEG recording due to ongoing generalized delta-theta slowing, with slightly greater delta slowing over the left hemisphere.    No interictal epileptiform abnormalities and no electrographic seizures were recorded.    These findings indicate moderate electrographic encephalopathy with slightly greater dysfunction over the left hemisphere.  These are etiologically nonspecific findings.  Clinical correlation is recommended.  Khoa Madden M.D., Professor of Neurology         D: 2019   T: 2019   MT: GABRIELA      Name:     SID AGUIAR   MRN:      [id number]        Account:        [id number]   :      1928           Procedure Date: 2019      Document: [id number]

## 2019-04-09 NOTE — PROGRESS NOTES
Obs note  Diagnostic tests and consults completed and resulted MET  Vital signs normal or at patient baseline MET  -returns to baseline functional status Partially met, unsteady at times  Safe disposition plan has been identified NOT MET

## 2019-04-09 NOTE — PROGRESS NOTES
Discharge instructions reviewed with patient and significant other. Verbalized understanding of all instructions. Patient discharged with wife transporting.

## 2019-04-09 NOTE — PROGRESS NOTES
04/09/19 1011   Quick Adds   Type of Visit Initial PT Evaluation   Living Environment   Lives With significant other   Living Arrangements house  ([facility])   Home Accessibility no concerns   Self-Care   Usual Activity Tolerance fair   Current Activity Tolerance fair   Regular Exercise No   Equipment Currently Used at Home cane, straight;shower chair;grab bar, tub/shower   Functional Level Prior   Ambulation 1-->assistive equipment   Transferring 1-->assistive equipment   Fall history within last six months no   Which of the above functional risks had a recent onset or change? none   Prior Functional Level Comment Pt is independent with dressing, bathing, basic bathroom ADLs. S.O. cooks, cleans, drives, sets up and gives pt his meds, is always present when pt showers in case he needs help.   General Information   Onset of Illness/Injury or Date of Surgery - Date 04/08/19   Referring Physician Brandyn Graves PA-C   Patient/Family Goals Statement return home   Pertinent History of Current Problem (include personal factors and/or comorbidities that impact the POC) Admitted under observation status with R sided weakness and speech difficulty. PMH: CVA, CAD, L carotid occlusion, PAD, afib, dementia.   Precautions/Limitations fall precautions   Weight-Bearing Status - LLE full weight-bearing   Weight-Bearing Status - RLE full weight-bearing   General Observations significant other Halina present   Cognitive Status Examination   Orientation person;place   Level of Consciousness alert   Follows Commands and Answers Questions 100% of the time;able to follow single-step instructions   Personal Safety and Judgment intact   Memory impaired   Pain Assessment   Patient Currently in Pain No   Posture    Posture Forward head position;Protracted shoulders   Range of Motion (ROM)   ROM Quick Adds No deficits were identified   Strength   Manual Muscle Testing Quick Adds No deficits were identified   Strength Comments B LEs 5/5  "globally   Bed Mobility   Bed Mobility Comments Supine to sit independent   Transfer Skills   Transfer Comments Sit to stand with SEC independent   Gait   Gait Comments Pt amb 250 ft with SEC and CGA progressing to SBA. Mild dec in balance, ambs fairly slow. Pt states this is baseline for him and significant other states he is baseline. She holds onto pt when they are out in the community. Pt declines a walker.   Balance   Balance Comments Balance mildly dec with gait but is baseline for patient. S.O. agrees.   Sensory Examination   Sensory Perception Comments Denies any numbness or tingling   Coordination   Coordination Comments B heel to shin WNL   General Therapy Interventions   Intervention Comments None needed   Clinical Impression   Criteria for Skilled Therapeutic Intervention no;evaluation only;no problems identified which require skilled intervention   Clinical Presentation Stable/Uncomplicated   Clinical Presentation Rationale medically stable   Clinical Decision Making (Complexity) Low complexity   Predicted Duration of Therapy Intervention (days/wks) eval only   Anticipated Discharge Disposition Home   Risk & Benefits of therapy have been explained Yes   Patient, Family & other staff in agreement with plan of care Yes   [facility] TM \"6 Clicks\"   2016, Trustees of [facility], under license to [employer].  All rights reserved.   6 Clicks Short Forms Basic Mobility Inpatient Short Form   [facility]  \"6 Clicks\" V.2 Basic Mobility Inpatient Short Form   1. Turning from your back to your side while in a flat bed without using bedrails? 4 - None   2. Moving from lying on your back to sitting on the side of a flat bed without using bedrails? 4 - None   3. Moving to and from a bed to a chair (including a wheelchair)? 4 - None   4. Standing up from a chair using your arms (e.g., wheelchair, or bedside chair)? 4 - None   5. To walk in hospital room? 4 - None   6. Climbing 3-5 " steps with a railing? 3 - A Little   Basic Mobility Raw Score (Score out of 24.Lower scores equate to lower levels of function) 23   Total Evaluation Time   Total Evaluation Time (Minutes) 30

## 2019-04-09 NOTE — PLAN OF CARE
A/Ox2, VSS, [place] and forgetful, vision loss baseline, mod carb diet, Ax1 cane, neuros intact, tele SR BBB, denies pain, MRI completed. Plan for OT/PT, SW, speech, and neurology consulted.

## 2019-04-09 NOTE — PLAN OF CARE
A&Ox4. VSS. Forgetful at times. Neuros intact. Blood sugars stable 171 at 0200. Tolerating Mod CHO diet. Assist x1 with cane. Tele SR with BBB. IV infusing. Denies pain    Obs note  Diagnostic tests and consults completed and resulted MET  Vital signs normal or at patient baseline MET  -returns to baseline functional status Partially met, unsteady at times  Safe disposition plan has been identified NOT MET

## 2019-04-09 NOTE — PLAN OF CARE
Discharge Planner SLP   Patient is a 89 year old male who was admitted for possible TIA/Stroke. History of an old left CVA. MRI was negative for anything new. Patient known to this department in the past. Had an OP video 12/12/16 that revealed minimal to mild dysphagia and a regular diet and thin liquids was recommended at the time.   Patient plan for discharge: Patient did not state.   Current status: Patient passed the swallow screen and has been tolerating a regular diet and thin liquids. Nurse reports being with him at breakfast without any concerns. Per his wife today he is back to his baseline for communication. No skilled intervention is indicated at this time.   Barriers to return to prior living situation: None per speech perspective.   Recommendations for discharge: Defer to PT needs.   Rationale for recommendations: Will complete the orders.        Entered by: Chloe Mclain 04/09/2019 10:25 AM

## 2019-04-09 NOTE — DISCHARGE SUMMARY
[facility]    Discharge Summary  Hospitalist    Date of Admission:  4/8/2019  Date of Discharge:  4/9/2019  3:56 PM  Discharging Provider: Eliane Sommer PA-C    Discharge Diagnoses   Expressive aphasia and right-sided weakness, suspect hypoperfusion following syncope  Orthostatic hypotension  Paroxysmal atrial fibrillation  Coronary artery disease  Cardiomyopathy  Hypertension   Type 2 Diabetes  PAD  Carotid artery disease  BPH  Dementia    History of Present Illness   Dustin Pearce is an 89 year old male with a past medical history of CVA, coronary artery disease, cardiomyopathy, left carotid occlusion, hyperlipidemia, peripheral arterial disease, paroxysmal atrial fibrillation and dementia who presented to the Emergency Department with his wife due to weakness and speech difficulty. He was admitted to observation for further management of suspected TIA.  The patient's significant other provides most of the history and med of the patient's dementia.  She reports that on the day of admission the patient went to see his eye doctor for an injection.  Once the procedure was over he told her he was not feeling well.  He sat down in a chair where she reported he lost consciousness for approximately 10 seconds.  When he came to he seemed to have significant difficulty getting words out and was subsequently noted to have some right-sided weakness.  She reports the symptoms are very similar to his prior stroke in 1989.  She reports he had otherwise recently been in his usual state of health.  The patient was brought to the emergency department where a code stroke was called.  Initial workup negative for evidence of acute CVA and patient symptoms improved significantly in the emergency department.  He was subsequently admitted to observation for further neurology consultation and workup for possible TIA.  Please see admission history and physical by Brandyn Graves PA-C for additional information.       On day of discharge patient reports he is feeling well. His wife states that he is back to his baseline and is ambulating at baseline with use of his cane.  She reports that the patient actually has a history of recurrent syncope known to care team but that his symptoms of aphasia and weakness upon waking were more pronounced than they have been with prior episodes.  Today the patient denies chest pain, shortness of breath, palpitations.  He denies dizziness with standing.  He is tolerating p.o. and ambulating with his cane.    Hospital Course   Dustin Pearce was admitted on 4/8/2019.  The following problems were addressed during his hospitalization:    Aphasia and right-sided weakness, suspect transient hypoperfusion in the setting of left internal carotid artery occlusion.   Syncope  Patient's wife reported brief episode of syncope following an eye injection in clinic. Upon waking patient with significant expressive aphasia and right sided weakness. Code stroke called in the ED. Non-contrast head CT and CTA showed chronic encephalomalacia of the left frontal lobe unchanged from prior as well as occluded left internal carotid (also unchanged) and moderate stenosis of P2 segment of posterior cerebral artery (unchanged). MRI shows chronic left frontal infarct without acute abnormality. Back to baseline at time of admission per wife.  Patient's wife reports a history of several episodes of syncope  previously.  Per chart review it appears patient has been evaluated for orthostatic hypotension.  He has known left internal carotid occlusion as discussed above.  Patient was evaluated by N he was evaluated by PT and felt to be stable to return home with use of his cane and eurology during admission who felt his symptoms were likely representative of transient hypoperfusion in light of his vascular disease following a syncopal episode.  EEG was performed without evidence of seizure.  Discussed with Dr. Davis of  neurology who recommends outpatient cardiology follow-up for further syncope workup and consideration of long-term loop recorder.  Also discussed the patient has a high chads 2 Vascor of age and is not on anticoagulation.  We will defer this conversation to cardiology in the outpatient setting as patient has complicated medical history and is already on aspirin.  Was monitored on telemetry during admission without any evidence of arrhythmia.  Echo cardiogram was obtained and showed stable wall motion abnormalities with improvement patient from 35-40% to 45-50% and no significant valvular disease.  Orthostatics were checked and positive from lying to sitting.  Patient denied symptoms.  He may have some autonomic dysfunction in the setting of diabetes.  He does not appear clinically dry.  he does have compression stockings at home and it was recommended that he give these another try.  He will continue his aspirin and Plavix at discharge.  He was evaluated by physical therapy with recommendation to discharge home with use of cane and support of significant other as he is ambulating at baseline at the time of discharge.     Paroxysmal atrial fibrillation. [id number] very high at 8. Patient is on asa and Plavix PTA otherwise not on anticoagulation. Currently in sinus rhythm on admission.   --continue metoprolol 12.5mg at bedtime  --will hold off on further anticoagulation after discussion with neurology   Patient is already on aspirin and Plavix.  Patient will follow up with cardiology to discuss     Coronary artery disease with history of MI  Cardiomyopathy with EF of 45-50%, improved from prior  Hypertension  Dyslipidemia  No CP or SOB on admission. EKG shows sinus rhythm with RBBB. Echo this admission with improvement in EF from 35-40% to 45-50% with stable WMA. Lipid panel with LDL of 64.   --continue asa and plavix  --continue statin, metoprolol, lisinopril     Type 2 Diabetes mellitus. On basaglar 22 units at hs  (recently increased from 18) as well as metformin.   --continued PTA regimen     Carotid artery disease   Peripheral arterial disease  He has undergone right CEA in the past. Imaging this admission shows known occlusion of left carotid. He has seen Vascular Surgery in the past.   --Per discussion with neurology vascular surgery consult in the inpatient setting unnecessary, patient follows with Dr. Rodriguez and can follow-up routinely  --continue asa, plavix, statin     Dementia. At baseline per his wife     BPH. Continued Flomax     This patient was seen and examined with Dr. Lind who agrees with the above plan.    Eliane Sommer PA-C    Significant Results and Procedures   See below     Code Status   Full Code       Primary Care Physician   Matt Morrissey    Physical Exam   Temp: 97.1  F (36.2  C) Temp src: Oral BP: 146/52 Pulse: 89 Heart Rate: 66 Resp: 18 SpO2: 95 % O2 Device: None (Room air)    Vitals:    04/08/19 1052 04/08/19 1240 04/09/19 0631   Weight: 93.6 kg (206 lb 4 oz) 71.8 kg (158 lb 4.8 oz) 74.3 kg (163 lb 14.4 oz)     Vital Signs with Ranges  Temp:  [96.6  F (35.9  C)-98.5  F (36.9  C)] 97.1  F (36.2  C)  Pulse:  [89] 89  Heart Rate:  [66-89] 66  Resp:  [16-20] 18  BP: (123-163)/(43-63) 146/52  SpO2:  [94 %-96 %] 95 %  I/O last 3 completed shifts:  In: 1893.5 [P.O.:360; I.V.:1533.5]  Out: -     Constitutional: Alert, oriented to person, place, general situation. Forgetful. Appears comfortable.   ENT: normal cephalic, moist mucous membranes  Respiratory: Lungs clear to auscultation bilaterally, no increased work of breathing or wheezing   Cardiovascular: Regular rate and rhythm, systolic murmur, no LE edema, distal pulses +2/4  GI: active bowel sounds, abdomen soft, non-tender  Skin/Integumen: warm, dry  Neuro:  Cranial nerves 2-12 grossly intact. No focal deficits. Speech is clear. Strength is equal +5/5 bilaterally.   MSK:  Moves all four extremities no gross deformity      Discharge Disposition    Discharged to home  Condition at discharge: Stable    Consultations This Hospital Stay   NEUROLOGY IP CONSULT  PHYSICAL THERAPY ADULT IP CONSULT  OCCUPATIONAL THERAPY ADULT IP CONSULT  SPEECH LANGUAGE PATH ADULT IP CONSULT  SWALLOW EVAL SPEECH PATH AT BEDSIDE IP CONSULT  CARE TRANSITION RN/SW IP CONSULT    Time Spent on this Encounter   I, Eliane Sommer, personally saw the patient today and spent greater than 30 minutes discharging this patient.    Discharge Orders      Follow-up and recommended labs and tests     Follow up Neurology Appointment:  Friday April 26, 2019 @ 4:00 pm    [facility]   [address], [address]   [place] [address]   [phone]     Reason for your hospital stay    You were here for evaluation of syncope, difficulty with speech and weakness.     Follow-up and recommended labs and tests     Follow up with Cardiology next week to discuss hospitalization and consider outpatient cardiac monitoring. Discuss anticoagulation. Discuss recurrent syncope and orthostatic hypotension.     Follow up with Neurology in a few weeks per our Neurologist's recommendation.    Follow up with primary care provider in 1-2 weeks for hospital follow up.     Activity    Your activity upon discharge: activity as tolerated     Discharge Instructions    Change positions slowly and cautiously    Try your compression stockings again if able, this may help with blood pressure drop    Drink fluids to stay hydrated     When to contact your care team    You should be re-evaluated with recurrent symptoms     Full Code     Diet    Follow this diet upon discharge: Orders Placed This Encounter      Moderate Consistent CHO Diet     Discharge Medications   Discharge Medication List as of 4/9/2019  2:36 PM      CONTINUE these medications which have NOT CHANGED    Details   AMITRIPTYLINE HCL PO Take 25 mg by mouth nightly as needed for sleep, Historical      aspirin EC 81 MG EC tablet Take 1 tablet (81 mg)  by mouth daily, Disp-30 tablet, R-0, E-Prescribe      ATORVASTATIN CALCIUM PO Take 40 mg by mouth At Bedtime , Historical      bisacodyl (DULCOLAX) 10 MG Suppository Place 10 mg rectally daily as needed for constipation, Historical      clopidogrel (PLAVIX) 75 MG tablet TAKE 1 TABLET BY MOUTH DAILY, Disp-90 tablet, R-2, E-Prescribe      DULoxetine (CYMBALTA) 30 MG EC capsule TAKE 1 CAPSULE(30 MG) BY MOUTH DAILY, Disp-90 capsule, R-3, E-Prescribe      insulin glargine (BASAGLAR KWIKPEN) 100 UNIT/ML pen Inject 22 Units Subcutaneous At Bedtime , Disp-15 mL, R-11, Historical      ipratropium (ATROVENT) 0.03 % nasal spray INHALE 1 SPRAY IN EACH NOSTRIL EVERY 12 HOURS AS NEEDED, Disp-30 mL, R-0, E-Prescribe      lisinopril (PRINIVIL/ZESTRIL) 5 MG tablet Take 1 tablet (5 mg) by mouth daily, Disp-30 tablet, R-1, E-Prescribe      MAGNESIUM-OXIDE 400 (241.3 Mg) MG tablet TAKE 1 TABLET BY MOUTH TWICE DAILY, Disp-180 tablet, R-0, E-Prescribe      metFORMIN (GLUCOPHAGE) 1000 MG tablet TAKE 1 TABLET BY MOUTH TWICE DAILY WITH MEALS, Disp-180 tablet, R-3, E-Prescribe      metoprolol succinate ER (TOPROL-XL) 25 MG 24 hr tablet Take 12.5 mg by mouth At Bedtime, Historical      nitroGLYcerin (NITROSTAT) 0.4 MG sublingual tablet For chest pain place 1 tablet under the tongue every 5 minutes for 3 doses. If symptoms persist 5 minutes after 1st dose call 911., Disp-25 tablet, R-0, E-Prescribe      omeprazole (PRILOSEC) 40 MG DR capsule TAKE 1 CAPSULE(40 MG) BY MOUTH DAILY 30 TO 60 MINUTES BEFORE A MEAL, Disp-90 capsule, R-3, E-Prescribe      polyethylene glycol (MIRALAX/GLYCOLAX) Packet Take 17 g by mouth daily as needed for constipation, Historical      tamsulosin (FLOMAX) 0.4 MG capsule Take 0.4 mg by mouth At Bedtime, Historical      blood glucose monitoring (NO BRAND SPECIFIED) meter device kit Use to test blood sugar bid times daily or as directed.Disp-1 kit, Q-[id number]-Lenitulcp      blood glucose monitoring (TRUE METRIX BLOOD GLUCOSE  "TEST) test strip USE TO TEST BLOOD SUGAR THREE TIMES DAILY OR AS DIRECTED, Disp-300 strip, R-1, E-Prescribe      order for DME Equipment being ordered: True Matrix Blood Glucose meter.Disp-1 Act, R-11, Local Print           Allergies   Allergies   Allergen Reactions     Oxycodone Other (See Comments)     \"TERRIBLE SWEATING\"     Sulfa Drugs      Tetracycline      Data   Most Recent 3 CBC's:  Recent Labs   Lab Test 04/08/19  1019 06/13/18 06/04/18 06/01/18  0810   WBC 10.1  --  7.5  --  7.5   HGB 12.7* 10.6* 9.8*   < > 10.5*   MCV 92  --  93  --  91     --  180  --  155    < > = values in this interval not displayed.      Most Recent 3 BMP's:  Recent Labs   Lab Test 04/08/19  1019 02/28/19  1913 06/13/18    141 139   POTASSIUM 4.9 5.1 4.5   CHLORIDE 104 107 104   CO2 28 26 29   BUN 21 22 23   CR 0.86 0.87 0.91   ANIONGAP 6 8 6   ALLISON 9.0 9.7 9.1   * 147* 129*     Most Recent 2 LFT's:  Recent Labs   Lab Test 06/01/18  0810 05/27/18  0710   AST 15 18   ALT 14 15   ALKPHOS 67 60   BILITOTAL 0.6 0.3     Most Recent INR's and Anticoagulation Dosing History:  Anticoagulation Dose History     Recent Dosing and Labs Latest Ref Rng & Units 8/19/2009 1/20/2017 12/31/2017 4/8/2019    INR 0.86 - 1.14 0.99 1.03 0.99 1.00        Most Recent 3 Troponin's:  Recent Labs   Lab Test 06/01/18  1940 06/01/18  1605 06/01/18  1015  05/27/18  0119   TROPI 0.093* 0.093* 0.133*   < >  --    TROPONIN  --   --   --   --  0.00    < > = values in this interval not displayed.     Most Recent Cholesterol Panel:  Recent Labs   Lab Test 04/09/19  0626   CHOL 134   LDL 64   HDL 47   TRIG 113     Most Recent 6 Bacteria Isolates From Any Culture (See EPIC Reports for Culture Details):  Recent Labs   Lab Test 05/26/18  2246 05/26/18  2135 03/12/18  1454 02/08/18  0218 06/06/17  1846 06/04/17  1027   CULT No growth No growth  No growth >100,000 colonies/mL  Enterococcus faecalis  * >100,000 colonies/mL  Enterococcus faecalis  *  " >100,000 colonies/mL  Strain 2  Enterococcus faecalis  * No anaerobes isolated  No growth No anaerobes isolated  Moderate growth Staphylococcus aureus*     Most Recent TSH, T4 and A1c Labs:  Recent Labs   Lab Test 02/28/19  1913  06/03/18  0520   TSH  --   --  4.45*   T4  --   --  0.98   A1C 8.7*   < >  --     < > = values in this interval not displayed.     Results for orders placed or performed during the hospital encounter of 04/08/19   CT Head w/o Contrast    Narrative    CT SCAN OF THE HEAD WITHOUT CONTRAST   4/8/2019 10:28 AM     HISTORY: Code stroke, aphasia and right-sided weakness.    TECHNIQUE:  Axial images of the head and coronal reformations without  IV contrast material. Radiation dose for this scan was reduced using  automated exposure control, adjustment of the mA and/or kV according  to patient size, or iterative reconstruction technique.    COMPARISON: None.    FINDINGS:     Intracranial contents: There is diffuse parenchymal volume loss.   White matter changes are present in the cerebral hemispheres that are  consistent with small vessel ischemic disease in this age patient.  Chronic encephalomalacia is again noted in the left frontal lobe. This  is unchanged. There is no evidence of intracranial hemorrhage, mass,  acute infarct or anomaly.    Visualized orbits/sinuses/mastoids:  The visualized portions of the  sinuses and mastoids appear normal.    Osseous structures/soft tissues:  There is no evidence of trauma.      Impression    IMPRESSION:   1. No acute pathology. No bleed, mass, or areas of acute infarction  are identified.  2. Chronic encephalomalacia is again noted in the left frontal lobe.  This is unchanged.  3. There is diffuse parenchymal volume loss.  White matter changes are  present in the cerebral hemispheres that are consistent with small  vessel ischemic disease in this age patient.      KAM PHAN MD   CTA Head Neck with Contrast    Narrative    CTA  HEAD NECK WITH CONTRAST   4/8/2019 10:31 AM     HISTORY: Code Stroke    TECHNIQUE:  Precontrast localizing scans were followed by CT  angiography with an injection of 70mL [id number]-370 IV contrast with  scans through the head and neck.  Images were transferred to a  separate 3-D workstation where multiplanar reformations and 3-D images  were created.  Estimates of carotid stenoses are made relative to the  distal internal carotid artery diameters except as noted. Radiation  dose for this scan was reduced using automated exposure control,  adjustment of the mA and/or kV according to patient size, or iterative  reconstruction technique.    COMPARISON: MR scan dated 1/1/2018. CT end CT angiogram scan dated  12/31/2017    FINDINGS: Estimates of stenosis at the carotid bifurcations are  relative to the distal internal carotids.    ARCH: Extensive atherosclerotic plaque is seen in the arch of the  aorta.    NECK CTA:  Right Carotid:  The right common carotid artery is normal.  The right  carotid bifurcation appears normal.  The right internal carotid artery  is negative.     Left Carotid:  The left common carotid artery is unremarkable.  The  left internal carotid is occluded at the bifurcation. This was also  present on the scan of 12/31/2017..  There is collateral blood flow 2  the terminal segment of the left internal carotid via the posterior  communicating artery.    Vertebrals:  The vertebral arteries are normal.    HEAD CTA:  Anterior Circulation: No occluded vessels are seen. .    Posterior Circulation: There is a moderate stenosis in the P2 segment  of the left posterior cerebral.. This is unchanged compared to  12/31/2017.    MISC:: None      Impression    IMPRESSION:   1. Occluded left internal carotid artery. The occlusion extends from  the bifurcation to the terminal segment of the left internal carotid.  This is also present on the previous scan from 2017.  2. Collateral blood flow to the left middle cerebral artery via the  left  posterior communicating artery and anterior communicating artery.  3. No occluded intracranial vessels are identified.  4. Moderate stenosis P2 segment left posterior cerebral. This is  unchanged since 2017.      KAM PHAN MD   MRI Brain w & w/o contrast    Narrative    MRI OF THE BRAIN WITHOUT AND WITH CONTRAST 4/8/2019 5:58 PM     COMPARISON: Head CT same day.    HISTORY: Focal neurologic deficit, less than 6 hours, stroke  suspected.    TECHNIQUE: Axial diffusion-weighted with ADC map, axial T2-weighted  with fat saturation, axial T1-weighted, axial turboFLAIR and coronal  T1-weighted images of the brain were acquired without intravenous  contrast. Following intravenous administration of gadolinium (7  Gadavist), axial T1-weighted images of the brain were acquired.     FINDINGS: There is moderate diffuse cerebral volume loss. There are  numerous scattered focal and extensive confluent periventricular areas  of abnormal T2 signal hyperintensity in the cerebral white matter  bilaterally that are consistent with sequela of chronic small vessel  ischemic disease. A chronic ischemic infarct at the anterolateral  aspect of the left frontal lobe is again noted without change.    The ventricles and basal cisterns are within normal limits in  configuration given the degree of cerebral volume loss. There is no  midline shift. There are no extra-axial fluid collections. There is no  evidence for stroke or acute intracranial hemorrhage.  There is no  abnormal contrast enhancement in the brain or its coverings.    There is no sinusitis or mastoiditis.      Impression    IMPRESSION: Diffuse cerebral volume loss and cerebral white matter  changes consistent with chronic small vessel ischemic disease. Chronic  left frontal infarct again noted without change. No evidence for acute  intracranial pathology.     LISANDRO MAGALLON MD

## 2019-04-10 ENCOUNTER — TELEPHONE (OUTPATIENT)
Dept: FAMILY MEDICINE | Facility: CLINIC | Age: 84
End: 2019-04-10

## 2019-04-10 NOTE — TELEPHONE ENCOUNTER
"ED / Discharge Outreach Protocol    Patient Contact    Attempt # 1    Was call answered? Call answered, then went to static. Tried calling back.     ED/Discharge Protocol    \"Hi, my name is Elda Hdz, a registered nurse, and I am calling on behalf of Dr. Morrissey's office at [place].  I am calling to follow up and see how things are going for you after your recent visit.\"    \"I see that you were in the (ER/UC/IP) on 4/8/19.    How are you doing now that you are home?\" doing okay     Is patient experiencing symptoms that may require a hospital visit?  No     Discharge Instructions    \"Let's review your discharge instructions.  What is/are the follow-up recommendations?  Pt. Response: follow up with neurology, cardiology    \"Were you instructed to make a follow-up appointment?\"  Pt. Response: Yes.  Has appointment been made?   No.  \"Can I help you schedule that appointment?\" No, pt declined and said she'd call back       \"When you see the provider, I would recommend that you bring your discharge instructions with you.    Medications    \"How many new medications are you on since your hospitalization/ED visit?\"    0-1  \"How many of your current medicines changed (dose, timing, name, etc.) while you were in the hospital/ED visit?\"   0-1  \"Do you have questions about your medications?\"   No  \"Were you newly diagnosed with heart failure, COPD, diabetes or did you have a heart attack?\"   No  For patients on insulin: \"Did you start on insulin in the hospital or did you have your insulin dose changed?\"   No    Medication reconciliation completed? Yes    Was MTM referral placed (*Make sure to put transitions as reason for referral)?   No    Call Summary    \"Do you have any questions or concerns about your condition or care plan at the moment?\"    No  Triage nurse advice given: N/A    Patient was in ER 3x in the past year (assess appropriateness of ER visits.)      \"If you have questions or things don't continue to improve, " "we encourage you contact us through the main clinic number,  ([phone]).  Even if the clinic is not open, triage nurses are available 24/7 to help you.     We would like you to know that our clinic has extended hours (provide information).  We also have urgent care (provide details on closest location and hours/contact info)\"      \"Thank you for your time and take care!\"      Elda FERNÁNDEZ RN    "

## 2019-04-10 NOTE — TELEPHONE ENCOUNTER
Chief Complaint: Syncope, Unspecified Syncope Type,TUE 09-APR-2019,ed/ip  0 / 0    [phone] (home) none (work)

## 2019-04-15 ENCOUNTER — OFFICE VISIT (OUTPATIENT)
Dept: CARDIOLOGY | Facility: CLINIC | Age: 84
End: 2019-04-15
Payer: MEDICARE

## 2019-04-15 VITALS
WEIGHT: 166 LBS | DIASTOLIC BLOOD PRESSURE: 69 MMHG | HEIGHT: 70 IN | SYSTOLIC BLOOD PRESSURE: 118 MMHG | BODY MASS INDEX: 23.77 KG/M2 | HEART RATE: 63 BPM

## 2019-04-15 DIAGNOSIS — R55 SYNCOPE, UNSPECIFIED SYNCOPE TYPE: ICD-10-CM

## 2019-04-15 DIAGNOSIS — I48.0 PAROXYSMAL ATRIAL FIBRILLATION (H): ICD-10-CM

## 2019-04-15 DIAGNOSIS — I25.118 CORONARY ARTERY DISEASE OF NATIVE ARTERY OF NATIVE HEART WITH STABLE ANGINA PECTORIS (H): Primary | ICD-10-CM

## 2019-04-15 DIAGNOSIS — R42 LIGHTHEADEDNESS: ICD-10-CM

## 2019-04-15 DIAGNOSIS — I42.9 CARDIOMYOPATHY, UNSPECIFIED TYPE (H): ICD-10-CM

## 2019-04-15 PROCEDURE — 99214 OFFICE O/P EST MOD 30 MIN: CPT | Performed by: NURSE PRACTITIONER

## 2019-04-15 ASSESSMENT — MIFFLIN-ST. JEOR: SCORE: 1414.22

## 2019-04-15 NOTE — PROGRESS NOTES
Cardiology Clinic Progress Note  Dustin Pearce MRN# [id number]   YOB: 1928 Age: 89 year old          Assessment and Plan:     1. Syncope    Neurology thought symptoms were secondary to transient hypoperfusion given LICA occlusion.    Positive for orthostatic hyoptension    Declined 30 day event monitor, but was agreeable to wear 14 day Zio Patch    Refer to EP for evaluation of potential arrhythmogenic causes of syncope.     2. Suspected TIA    Status post right carotid endarterectomy 12/2017 after being admitted for TIA    3. Paroxysmal atrial fibrillation    [id number] score 8 (age, HTN, DM, CAD/PAD, TIA, CHF)    Refer to EP for discuss regarding anticoagulation options with PAF and elevated [id number] score    4. Coronary artery disease    Coronary angiogram revealed  of the RCA and severe proximal left circumflex and LAD disease     Status post drug-eluting stent to the LAD and circumflex    Continue dual antiplatelet therapy with aspirin and Plavix for 1 year uninterrupted and aspirin lifelong    5. Ischemic heart cardiomyopathy, chronic systolic heart failure exacerbation    LVEF estimated at 45-50% (previously 35-40%)    Metoprolol XL 25 mg daily, lisinopril 5 mg daily    6. Hypertension    Well-controlled    Continue metoprolol and lisinopril    7. 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    8. Hyperlipidemia    Most recent LDL was 61    Continue atorvastatin 40 mg daily    >50% of this 30 minute office visit was spent on care coordination and counseling         History of Presenting Illness:    Dustin Pearce is a very pleasant 89 year old patient of Dr. Samuel who presents today for a hospital discharge follow up.  He has a past medical history significant for dementia, diabetes,  "hypertension, dyslipidemia, anxiety, depression, prior TIA,    His cardiovascular history includes paroxysmal atrial fibrillation, ischemic cardiomyopathy, chronic systolic congestive heart failure, coronary artery disease (status post NSTEMI and stenting to the proximal LAD, proximal circumflex and  of the RCA 5/2018).  He also has a history of peripheral arterial disease status post right carotid endarterectomy, left carotid occlusion, severe left subclavian and axillary artery disease and status post left common iliac artery stenting and left SFA angioplasty.    He was admitted to [facility] on 4/8/2019 after having an eye injection and sat down in the chair and had loss of consciousness for approximately 10 seconds.  When he regained consciousness he had difficulty with word finding and right-sided weakness.      He was brought to the emergency department for further evaluation. A code stroke was called in the emergency department and a noncontrast head CT and CTA showed no new findings.  An MRI showed chronic left frontal infarct with no acute abnormality. He was admitted for observation and workup for possible TIA. EEG was performed without evidence of seizure. No arrhythmias were noted on telemetry during admission. He had positive orthostatic hypotension from lying to sitting. Neurology recommended outpatient cardiology follow up with potential loop recorder as they thought events were \"concerning for syncope with recrudescence of prior stroke symptoms in the setting of transient hypoperfusion given LICA occlusion.\"     Echocardiogram showed improvement in LVEF to 45-50% from 35-40% without changes to the regional wall motion abnormalities (severe inferior and inferolateral wall hypokinesis).     Today he presents with his joão wife, Halina, who is his primary care giver. Halina still works at an insurance company in [place] and has to take PTO to attend appointments with Dustin. He " "has had no further syncope since discharge               Review of Systems:   Review of Systems:  Skin:  Positive for bruising   Eyes:  Positive for glasses  ENT:  Positive for hearing loss  Respiratory:  Negative    Cardiovascular:  dizziness;lightheadedness;fatigue;palpitations;chest pain;Negative for Positive for;edema  Gastroenterology: Positive for heartburn;reflux  Genitourinary:  Negative    Musculoskeletal:  Positive for arthritis  Neurologic:  Positive for stroke  Psychiatric:  Negative    Heme/Lymph/Imm:  Negative    Endocrine:  Positive for diabetes              Physical Exam:     Vitals: /69   Pulse 63   Ht 1.778 m (5' 10\")   Wt 75.3 kg (166 lb)   BMI 23.82 kg/m    Constitutional:  cooperative, alert and oriented, well developed, well nourished, in no acute distress        Skin:  warm and dry to the touch, no apparent skin lesions or masses noted        Head:  normocephalic, no masses or lesions        Eyes:  pupils equal and round;conjunctivae and lids unremarkable;sclera white        ENT:  no pallor or cyanosis, dentition good        Neck:  JVP normal   No L carotid pulse    Chest:  normal breath sounds, clear to auscultation, normal A-P diameter, normal symmetry, normal respiratory excursion, no use of accessory muscles        Cardiac: regular rhythm                  Abdomen:  abdomen soft        Vascular:   pulses below the femoral arteries are diminished                                    Extremities and Back:  no edema        Neurological:  no gross motor deficits               Medications:     Current Outpatient Medications   Medication Sig Dispense Refill     AMITRIPTYLINE HCL PO Take 25 mg by mouth nightly as needed for sleep       aspirin EC 81 MG EC tablet Take 1 tablet (81 mg) by mouth daily 30 tablet 0     ATORVASTATIN CALCIUM PO Take 40 mg by mouth At Bedtime        bisacodyl (DULCOLAX) 10 MG Suppository Place 10 mg rectally daily as needed for constipation       blood glucose " monitoring (NO BRAND SPECIFIED) meter device kit Use to test blood sugar bid times daily or as directed. 1 kit 0     blood glucose monitoring (TRUE METRIX BLOOD GLUCOSE TEST) test strip USE TO TEST BLOOD SUGAR THREE TIMES DAILY OR AS DIRECTED 300 strip 1     clopidogrel (PLAVIX) 75 MG tablet TAKE 1 TABLET BY MOUTH DAILY 90 tablet 2     DULoxetine (CYMBALTA) 30 MG EC capsule TAKE 1 CAPSULE(30 MG) BY MOUTH DAILY 90 capsule 3     insulin glargine (BASAGLAR KWIKPEN) 100 UNIT/ML pen Inject 22 Units Subcutaneous At Bedtime  15 mL 11     ipratropium (ATROVENT) 0.03 % nasal spray INHALE 1 SPRAY IN EACH NOSTRIL EVERY 12 HOURS AS NEEDED 30 mL 0     lisinopril (PRINIVIL/ZESTRIL) 5 MG tablet Take 1 tablet (5 mg) by mouth daily 30 tablet 1     MAGNESIUM-OXIDE 400 (241.3 Mg) MG tablet TAKE 1 TABLET BY MOUTH TWICE DAILY 180 tablet 0     metFORMIN (GLUCOPHAGE) 1000 MG tablet TAKE 1 TABLET BY MOUTH TWICE DAILY WITH MEALS 180 tablet 3     metoprolol succinate ER (TOPROL-XL) 25 MG 24 hr tablet Take 12.5 mg by mouth At Bedtime       nitroGLYcerin (NITROSTAT) 0.4 MG sublingual tablet For chest pain place 1 tablet under the tongue every 5 minutes for 3 doses. If symptoms persist 5 minutes after 1st dose call 911. 25 tablet 0     omeprazole (PRILOSEC) 40 MG DR capsule TAKE 1 CAPSULE(40 MG) BY MOUTH DAILY 30 TO 60 MINUTES BEFORE A MEAL 90 capsule 3     order for DME Equipment being ordered: True Matrix Blood Glucose meter. 1 Act 11     polyethylene glycol (MIRALAX/GLYCOLAX) Packet Take 17 g by mouth daily as needed for constipation       tamsulosin (FLOMAX) 0.4 MG capsule Take 0.4 mg by mouth At Bedtime             Family History   Problem Relation Age of Onset     Breast Cancer Mother      Heart Failure Father 81       Social History     Socioeconomic History     Marital status:      Spouse name: Not on file     Number of children: Not on file     Years of education: Not on file     Highest education level: Not on file    Occupational History     Not on file   Social Needs     Financial resource strain: Not on file     Food insecurity:     Worry: Not on file     Inability: Not on file     Transportation needs:     Medical: Not on file     Non-medical: Not on file   Tobacco Use     Smoking status: Former Smoker     Packs/day: 1.00     Years: 30.00     Pack years: 30.00     Types: Cigarettes     Last attempt to quit: 1999     Years since quittin.2     Smokeless tobacco: Never Used   Substance and Sexual Activity     Alcohol use: Yes     Alcohol/week: 4.2 oz     Types: 7 Standard drinks or equivalent per week     Comment: 1 drink per biweekly     Drug use: No     Sexual activity: Not Currently     Partners: Female   Lifestyle     Physical activity:     Days per week: Not on file     Minutes per session: Not on file     Stress: Not on file   Relationships     Social connections:     Talks on phone: Not on file     Gets together: Not on file     Attends [employer] service: Not on file     Active member of club or organization: Not on file     Attends meetings of clubs or organizations: Not on file     Relationship status: Not on file     Intimate partner violence:     Fear of current or ex partner: Not on file     Emotionally abused: Not on file     Physically abused: Not on file     Forced sexual activity: Not on file   Other Topics Concern     Parent/sibling w/ CABG, MI or angioplasty before 65F 55M? Not Asked   Social History Narrative     Not on file            Past Medical History:     Past Medical History:   Diagnosis Date     Aortic root dilatation (H)      Aortic stenosis      Benign essential hypertension 2017     Benign non-nodular prostatic hyperplasia with lower urinary tract symptoms 10/20/2016     CAD (coronary artery disease)     MI 1970     Cardiomyopathy (H)      Carotid artery stenosis 10/20/2016    chronically occluded left internal carotid artery     Carotid occlusion, left      Coronary artery disease  involving native coronary artery of native heart without angina pectoris 10/20/2016     Diabetes mellitus (H)      DJD (degenerative joint disease)      Gastro-oesophageal reflux disease      Gastroesophageal reflux disease without esophagitis 10/20/2016     Heart attack (H)      Hyperlipidemia      Hypertension      Mild cognitive impairment 10/20/2016     Nephrolithiasis     RECURRENT     Osteopenia      PAD (peripheral artery disease) (H)     peripheral angiogram 5/2017: The common iliac artery stenoses were stented and the superficial femoral artery stenoses were angioplastied     Paroxysmal atrial fibrillation (H)      PONV (postoperative nausea and vomiting)      RBBB with left anterior fascicular block      Unspecified cerebral artery occlusion with cerebral infarction               Past Surgical History:     Past Surgical History:   Procedure Laterality Date     AMPUTATE FOOT Left 6/4/2017    Procedure: AMPUTATE FOOT;  Sun Add#3: partial left foot amputation - Sagital Saw - Mini C-Arm;  Surgeon: Moe Kirk DPM;  Location:  OR     CHOLECYSTECTOMY       ENDARTERECTOMY CAROTID Right 1/3/2018    Procedure: ENDARTERECTOMY CAROTID;  RIGHT CAROTID ENDARTERECTOMY WITH EEG;  Surgeon: Roland Rodriguez MD;  Location:  OR     ESOPHAGOSCOPY, GASTROSCOPY, DUODENOSCOPY (EGD), COMBINED N/A 12/26/2016    Procedure: COMBINED ESOPHAGOSCOPY, GASTROSCOPY, DUODENOSCOPY (EGD);  Surgeon: Nasim Louis MD;  Location:  GI     GENITOURINARY SURGERY      KIDNEY STONE REMOVAL X 4     HC UGI ENDOSCOPY W EUS N/A 12/29/2016    Procedure: COMBINED ENDOSCOPIC ULTRASOUND, ESOPHAGOSCOPY, GASTROSCOPY, DUODENOSCOPY (EGD);  Surgeon: Nasim Louis MD;  Location:  GI     HERNIA REPAIR       INCISION AND DRAINAGE FOOT, COMBINED Left 6/6/2017    Procedure: COMBINED INCISION AND DRAINAGE FOOT;  REVISIONAL LEFT FOOT INCISION AND DRAINAGE WITH POSSIBLE FLAP CLOSURE , ANTIBIOTIC SOLUTION ;  Surgeon: Marissa  ERICKA Ferrer;  Location:  OR     LASER HOLMIUM LITHOTRIPSY URETER(S), INSERT STENT, COMBINED  3/2/2012    Procedure:COMBINED CYSTOSCOPY, URETEROSCOPY, LASER HOLMIUM LITHOTRIPSY URETER(S), INSERT STENT; CYSTOSCOPY, RIGHT RETROGRADES, RIGHT URETEROSCOPY, HOLMIUM LASER, RIGHT STENT PLACEMENT; Surgeon:ANJELICA LEÓN; Location: OR     ROTATOR CUFF REPAIR RT/LT                Allergies:   Oxycodone; Sulfa drugs; and Tetracycline       Data:   All laboratory data reviewed:  Last Comprehensive Metabolic Panel:  Sodium   Date Value Ref Range Status   04/08/2019 138 133 - 144 mmol/L Final     Potassium   Date Value Ref Range Status   04/08/2019 4.9 3.4 - 5.3 mmol/L Final     Chloride   Date Value Ref Range Status   04/08/2019 104 94 - 109 mmol/L Final     Carbon Dioxide   Date Value Ref Range Status   04/08/2019 28 20 - 32 mmol/L Final     Anion Gap   Date Value Ref Range Status   04/08/2019 6 3 - 14 mmol/L Final     Glucose   Date Value Ref Range Status   04/08/2019 195 (H) 70 - 99 mg/dL Final     Urea Nitrogen   Date Value Ref Range Status   04/08/2019 21 7 - 30 mg/dL Final     Creatinine   Date Value Ref Range Status   04/08/2019 0.86 0.66 - 1.25 mg/dL Final     GFR Estimate   Date Value Ref Range Status   04/08/2019 76 >60 mL/min/[1.73_m2] Final     Comment:     Non  GFR Calc  Starting 12/18/2018, serum creatinine based estimated GFR (eGFR) will be   calculated using the Chronic Kidney Disease Epidemiology Collaboration   (CKD-EPI) equation.       Calcium   Date Value Ref Range Status   04/08/2019 9.0 8.5 - 10.1 mg/dL Final         ASPEN CASTRO, CNP

## 2019-04-15 NOTE — LETTER
4/15/2019    Matt Morrissey MD  [address]  [place] [address]    RE: Dustin Poncearik       Dear Colleague,    I had the pleasure of seeing Dustin Pearce in the [facility].    Cardiology Clinic Progress Note  Dustin Pearce MRN# [id number]   YOB: 1928 Age: 89 year old          Assessment and Plan:     1. Syncope    Neurology thought symptoms were secondary to transient hypoperfusion given LICA occlusion.    Positive for orthostatic hyoptension    Declined 30 day event monitor, but was agreeable to wear 14 day Zio Patch    Refer to EP for evaluation of potential arrhythmogenic causes of syncope.     2. Suspected TIA    Status post right carotid endarterectomy 12/2017 after being admitted for TIA    3. Paroxysmal atrial fibrillation    [id number] score 8 (age, HTN, DM, CAD/PAD, TIA, CHF)    Refer to EP for discuss regarding anticoagulation options with PAF and elevated [id number] score    4. Coronary artery disease    Coronary angiogram revealed  of the RCA and severe proximal left circumflex and LAD disease     Status post drug-eluting stent to the LAD and circumflex    Continue dual antiplatelet therapy with aspirin and Plavix for 1 year uninterrupted and aspirin lifelong    5. Ischemic heart cardiomyopathy, chronic systolic heart failure exacerbation    LVEF estimated at 45-50% (previously 35-40%)    Metoprolol XL 25 mg daily, lisinopril 5 mg daily    6. Hypertension    Well-controlled    Continue metoprolol and lisinopril    7. 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    8. Hyperlipidemia    Most recent LDL was 61    Continue atorvastatin 40 mg daily    >50% of this 30 minute office visit was spent on care coordination and counseling         History  "of Presenting Illness:    Dustin Pearce is a very pleasant 89 year old patient of Dr. Samuel who presents today for a hospital discharge follow up.  He has a past medical history significant for dementia, diabetes, hypertension, dyslipidemia, anxiety, depression, prior TIA,    His cardiovascular history includes paroxysmal atrial fibrillation, ischemic cardiomyopathy, chronic systolic congestive heart failure, coronary artery disease (status post NSTEMI and stenting to the proximal LAD, proximal circumflex and  of the RCA 5/2018).  He also has a history of peripheral arterial disease status post right carotid endarterectomy, left carotid occlusion, severe left subclavian and axillary artery disease and status post left common iliac artery stenting and left SFA angioplasty.    He was admitted to [facility] on 4/8/2019 after having an eye injection and sat down in the chair and had loss of consciousness for approximately 10 seconds.  When he regained consciousness he had difficulty with word finding and right-sided weakness.      He was brought to the emergency department for further evaluation. A code stroke was called in the emergency department and a noncontrast head CT and CTA showed no new findings.  An MRI showed chronic left frontal infarct with no acute abnormality. He was admitted for observation and workup for possible TIA. EEG was performed without evidence of seizure. No arrhythmias were noted on telemetry during admission. He had positive orthostatic hypotension from lying to sitting. Neurology recommended outpatient cardiology follow up with potential loop recorder as they thought events were \"concerning for syncope with recrudescence of prior stroke symptoms in the setting of transient hypoperfusion given LICA occlusion.\"     Echocardiogram showed improvement in LVEF to 45-50% from 35-40% without changes to the regional wall motion abnormalities (severe inferior and inferolateral wall " "hypokinesis).     Today he presents with his joão wife, Halina, who is his primary care giver. Halina still works at an insurance company in [place] and has to take PTO to attend appointments with Dustin. He has had no further syncope since discharge               Review of Systems:   Review of Systems:  Skin:  Positive for bruising   Eyes:  Positive for glasses  ENT:  Positive for hearing loss  Respiratory:  Negative    Cardiovascular:  dizziness;lightheadedness;fatigue;palpitations;chest pain;Negative for Positive for;edema  Gastroenterology: Positive for heartburn;reflux  Genitourinary:  Negative    Musculoskeletal:  Positive for arthritis  Neurologic:  Positive for stroke  Psychiatric:  Negative    Heme/Lymph/Imm:  Negative    Endocrine:  Positive for diabetes              Physical Exam:     Vitals: /69   Pulse 63   Ht 1.778 m (5' 10\")   Wt 75.3 kg (166 lb)   BMI 23.82 kg/m     Constitutional:  cooperative, alert and oriented, well developed, well nourished, in no acute distress        Skin:  warm and dry to the touch, no apparent skin lesions or masses noted        Head:  normocephalic, no masses or lesions        Eyes:  pupils equal and round;conjunctivae and lids unremarkable;sclera white        ENT:  no pallor or cyanosis, dentition good        Neck:  JVP normal   No L carotid pulse    Chest:  normal breath sounds, clear to auscultation, normal A-P diameter, normal symmetry, normal respiratory excursion, no use of accessory muscles        Cardiac: regular rhythm                  Abdomen:  abdomen soft        Vascular:   pulses below the femoral arteries are diminished                                    Extremities and Back:  no edema        Neurological:  no gross motor deficits               Medications:     Current Outpatient Medications   Medication Sig Dispense Refill     AMITRIPTYLINE HCL PO Take 25 mg by mouth nightly as needed for sleep       aspirin EC 81 MG EC tablet Take 1 tablet (81 " mg) by mouth daily 30 tablet 0     ATORVASTATIN CALCIUM PO Take 40 mg by mouth At Bedtime        bisacodyl (DULCOLAX) 10 MG Suppository Place 10 mg rectally daily as needed for constipation       blood glucose monitoring (NO BRAND SPECIFIED) meter device kit Use to test blood sugar bid times daily or as directed. 1 kit 0     blood glucose monitoring (TRUE METRIX BLOOD GLUCOSE TEST) test strip USE TO TEST BLOOD SUGAR THREE TIMES DAILY OR AS DIRECTED 300 strip 1     clopidogrel (PLAVIX) 75 MG tablet TAKE 1 TABLET BY MOUTH DAILY 90 tablet 2     DULoxetine (CYMBALTA) 30 MG EC capsule TAKE 1 CAPSULE(30 MG) BY MOUTH DAILY 90 capsule 3     insulin glargine (BASAGLAR KWIKPEN) 100 UNIT/ML pen Inject 22 Units Subcutaneous At Bedtime  15 mL 11     ipratropium (ATROVENT) 0.03 % nasal spray INHALE 1 SPRAY IN EACH NOSTRIL EVERY 12 HOURS AS NEEDED 30 mL 0     lisinopril (PRINIVIL/ZESTRIL) 5 MG tablet Take 1 tablet (5 mg) by mouth daily 30 tablet 1     MAGNESIUM-OXIDE 400 (241.3 Mg) MG tablet TAKE 1 TABLET BY MOUTH TWICE DAILY 180 tablet 0     metFORMIN (GLUCOPHAGE) 1000 MG tablet TAKE 1 TABLET BY MOUTH TWICE DAILY WITH MEALS 180 tablet 3     metoprolol succinate ER (TOPROL-XL) 25 MG 24 hr tablet Take 12.5 mg by mouth At Bedtime       nitroGLYcerin (NITROSTAT) 0.4 MG sublingual tablet For chest pain place 1 tablet under the tongue every 5 minutes for 3 doses. If symptoms persist 5 minutes after 1st dose call 911. 25 tablet 0     omeprazole (PRILOSEC) 40 MG DR capsule TAKE 1 CAPSULE(40 MG) BY MOUTH DAILY 30 TO 60 MINUTES BEFORE A MEAL 90 capsule 3     order for DME Equipment being ordered: True Matrix Blood Glucose meter. 1 Act 11     polyethylene glycol (MIRALAX/GLYCOLAX) Packet Take 17 g by mouth daily as needed for constipation       tamsulosin (FLOMAX) 0.4 MG capsule Take 0.4 mg by mouth At Bedtime             Family History   Problem Relation Age of Onset     Breast Cancer Mother      Heart Failure Father 81       Social  History     Socioeconomic History     Marital status:      Spouse name: Not on file     Number of children: Not on file     Years of education: Not on file     Highest education level: Not on file   Occupational History     Not on file   Social Needs     Financial resource strain: Not on file     Food insecurity:     Worry: Not on file     Inability: Not on file     Transportation needs:     Medical: Not on file     Non-medical: Not on file   Tobacco Use     Smoking status: Former Smoker     Packs/day: 1.00     Years: 30.00     Pack years: 30.00     Types: Cigarettes     Last attempt to quit: 1999     Years since quittin.2     Smokeless tobacco: Never Used   Substance and Sexual Activity     Alcohol use: Yes     Alcohol/week: 4.2 oz     Types: 7 Standard drinks or equivalent per week     Comment: 1 drink per biweekly     Drug use: No     Sexual activity: Not Currently     Partners: Female   Lifestyle     Physical activity:     Days per week: Not on file     Minutes per session: Not on file     Stress: Not on file   Relationships     Social connections:     Talks on phone: Not on file     Gets together: Not on file     Attends Christianity service: Not on file     Active member of club or organization: Not on file     Attends meetings of clubs or organizations: Not on file     Relationship status: Not on file     Intimate partner violence:     Fear of current or ex partner: Not on file     Emotionally abused: Not on file     Physically abused: Not on file     Forced sexual activity: Not on file   Other Topics Concern     Parent/sibling w/ CABG, MI or angioplasty before 65F 55M? Not Asked   Social History Narrative     Not on file            Past Medical History:     Past Medical History:   Diagnosis Date     Aortic root dilatation (H)      Aortic stenosis      Benign essential hypertension 2017     Benign non-nodular prostatic hyperplasia with lower urinary tract symptoms 10/20/2016     CAD  (coronary artery disease)     MI 1970     Cardiomyopathy (H)      Carotid artery stenosis 10/20/2016    chronically occluded left internal carotid artery     Carotid occlusion, left      Coronary artery disease involving native coronary artery of native heart without angina pectoris 10/20/2016     Diabetes mellitus (H)      DJD (degenerative joint disease)      Gastro-oesophageal reflux disease      Gastroesophageal reflux disease without esophagitis 10/20/2016     Heart attack (H)      Hyperlipidemia      Hypertension      Mild cognitive impairment 10/20/2016     Nephrolithiasis     RECURRENT     Osteopenia      PAD (peripheral artery disease) (H)     peripheral angiogram 5/2017: The common iliac artery stenoses were stented and the superficial femoral artery stenoses were angioplastied     Paroxysmal atrial fibrillation (H)      PONV (postoperative nausea and vomiting)      RBBB with left anterior fascicular block      Unspecified cerebral artery occlusion with cerebral infarction               Past Surgical History:     Past Surgical History:   Procedure Laterality Date     AMPUTATE FOOT Left 6/4/2017    Procedure: AMPUTATE FOOT;  Sun Add#3: partial left foot amputation - Sagital Saw - Mini C-Arm;  Surgeon: Moe Kirk DPM;  Location:  OR     CHOLECYSTECTOMY       ENDARTERECTOMY CAROTID Right 1/3/2018    Procedure: ENDARTERECTOMY CAROTID;  RIGHT CAROTID ENDARTERECTOMY WITH EEG;  Surgeon: Roland Rodriguez MD;  Location:  OR     ESOPHAGOSCOPY, GASTROSCOPY, DUODENOSCOPY (EGD), COMBINED N/A 12/26/2016    Procedure: COMBINED ESOPHAGOSCOPY, GASTROSCOPY, DUODENOSCOPY (EGD);  Surgeon: Nasim Louis MD;  Location:  GI     GENITOURINARY SURGERY      KIDNEY STONE REMOVAL X 4     HC UGI ENDOSCOPY W EUS N/A 12/29/2016    Procedure: COMBINED ENDOSCOPIC ULTRASOUND, ESOPHAGOSCOPY, GASTROSCOPY, DUODENOSCOPY (EGD);  Surgeon: Nasim Louis MD;  Location:  GI     HERNIA REPAIR       INCISION  AND DRAINAGE FOOT, COMBINED Left 6/6/2017    Procedure: COMBINED INCISION AND DRAINAGE FOOT;  REVISIONAL LEFT FOOT INCISION AND DRAINAGE WITH POSSIBLE FLAP CLOSURE , ANTIBIOTIC SOLUTION ;  Surgeon: Nabil Ann DPM;  Location:  OR     LASER HOLMIUM LITHOTRIPSY URETER(S), INSERT STENT, COMBINED  3/2/2012    Procedure:COMBINED CYSTOSCOPY, URETEROSCOPY, LASER HOLMIUM LITHOTRIPSY URETER(S), INSERT STENT; CYSTOSCOPY, RIGHT RETROGRADES, RIGHT URETEROSCOPY, HOLMIUM LASER, RIGHT STENT PLACEMENT; Surgeon:ANJELICA LEÓN; Location: OR     ROTATOR CUFF REPAIR RT/LT                Allergies:   Oxycodone; Sulfa drugs; and Tetracycline       Data:   All laboratory data reviewed:  Last Comprehensive Metabolic Panel:  Sodium   Date Value Ref Range Status   04/08/2019 138 133 - 144 mmol/L Final     Potassium   Date Value Ref Range Status   04/08/2019 4.9 3.4 - 5.3 mmol/L Final     Chloride   Date Value Ref Range Status   04/08/2019 104 94 - 109 mmol/L Final     Carbon Dioxide   Date Value Ref Range Status   04/08/2019 28 20 - 32 mmol/L Final     Anion Gap   Date Value Ref Range Status   04/08/2019 6 3 - 14 mmol/L Final     Glucose   Date Value Ref Range Status   04/08/2019 195 (H) 70 - 99 mg/dL Final     Urea Nitrogen   Date Value Ref Range Status   04/08/2019 21 7 - 30 mg/dL Final     Creatinine   Date Value Ref Range Status   04/08/2019 0.86 0.66 - 1.25 mg/dL Final     GFR Estimate   Date Value Ref Range Status   04/08/2019 76 >60 mL/min/[1.73_m2] Final     Comment:     Non  GFR Calc  Starting 12/18/2018, serum creatinine based estimated GFR (eGFR) will be   calculated using the Chronic Kidney Disease Epidemiology Collaboration   (CKD-EPI) equation.       Calcium   Date Value Ref Range Status   04/08/2019 9.0 8.5 - 10.1 mg/dL Final         Thank you for allowing me to participate in the care of your patient.    Sincerely,     ASPEN CASTRO [facility]  Care

## 2019-04-18 ENCOUNTER — HOSPITAL ENCOUNTER (OUTPATIENT)
Dept: CARDIOLOGY | Facility: CLINIC | Age: 84
Discharge: HOME | End: 2019-04-18
Attending: NURSE PRACTITIONER | Admitting: NURSE PRACTITIONER
Payer: MEDICARE

## 2019-04-18 DIAGNOSIS — R42 LIGHTHEADEDNESS: ICD-10-CM

## 2019-04-18 DIAGNOSIS — R55 SYNCOPE, UNSPECIFIED SYNCOPE TYPE: ICD-10-CM

## 2019-04-18 PROCEDURE — 0296T ZIO PATCH HOLTER ADULT PEDIATRIC GREATER THAN 48 HRS: CPT

## 2019-04-18 PROCEDURE — 0298T ZIO PATCH HOLTER ADULT PEDIATRIC GREATER THAN 48 HRS: CPT | Performed by: INTERNAL MEDICINE

## 2019-05-03 DIAGNOSIS — R09.89 RUNNY NOSE: ICD-10-CM

## 2019-05-03 NOTE — TELEPHONE ENCOUNTER
ipratropium (ATROVENT) 0.03 % nasal spray 30 mL 0 2/7/2019     Last Written Prescription Date:  2/7/19  Last Fill Quantity: 30 ml,  # refills: 0   Last office visit: 3/22/2019 with prescribing provider:  Yuni White Office Visit:   Next 5 appointments (look out 90 days)    May 30, 2019  6:30 PM CDT  Office Visit with Matt Morrissey MD  [facility] [address] [facility] [id number] [phone]         Requested Prescriptions   Pending Prescriptions Disp Refills     ipratropium (ATROVENT) 0.03 % nasal spray [Pharmacy Med Name: IPRATROPIUM 0.03% DANIEL SP 30ML (345)] 30 mL 0     Sig: INHALE 1 SPRAY IN EACH NOSTRIL EVERY 12 HOURS AS NEEDED       There is no refill protocol information for this order        [facility] Follow-up to PHQ 1/6/2017 1/27/2017 11/30/2018   PHQ-9 9. Suicide Ideation past 2 weeks Not at all Not at all Not at all

## 2019-05-06 DIAGNOSIS — K59.00 CONSTIPATION, UNSPECIFIED CONSTIPATION TYPE: ICD-10-CM

## 2019-05-06 DIAGNOSIS — I25.10 CORONARY ARTERY DISEASE INVOLVING NATIVE CORONARY ARTERY OF NATIVE HEART WITHOUT ANGINA PECTORIS: ICD-10-CM

## 2019-05-06 RX ORDER — IPRATROPIUM BROMIDE 21 UG/1
SPRAY, METERED NASAL
Qty: 30 ML | Refills: 11 | Status: SHIPPED | OUTPATIENT
Start: 2019-05-06 | End: 2020-01-01

## 2019-05-06 NOTE — TELEPHONE ENCOUNTER
Routing refill request to provider for review/approval because:  Drug not on the [facility] refill protocol   Please authorize if appropriate.  Thanks,  Kirti Wade RN

## 2019-05-08 RX ORDER — LISINOPRIL 2.5 MG/1
TABLET ORAL
Qty: 90 TABLET | Refills: 0 | Status: SHIPPED | OUTPATIENT
Start: 2019-05-08 | End: 2019-08-04

## 2019-05-08 NOTE — TELEPHONE ENCOUNTER
Routing refill request to provider for review/approval because:  Lisinopril- A break in medication (Per Med List) Spoke with patient and friend, Halina. Pt has been taking 2.5mg daily and does need refills prior to appt on 5/30    Mag-Oxide- not on [facility] Refill Protocol     Please review and authorize if appropriate,     Thank you,   Kathy GILLESPIE RN

## 2019-05-08 NOTE — TELEPHONE ENCOUNTER
"lisinopril (PRINIVIL/ZESTRIL) 5 MG tablet 30 tablet 1 6/1/2018  No   Sig - Route: Take 1 tablet (5 mg) by mouth daily - Oral     Last Written Prescription Date:  6-1-2018  Last Fill Quantity: 30,  # refills: 1   Last office visit: 3/22/2019 with prescribing provider:     Future Office Visit:   Next 5 appointments (look out 90 days)    May 30, 2019  6:30 PM CDT  Office Visit with Matt Morrissey MD  [facility] ([facility] [address] [facility] [id number] [phone]        Requested Prescriptions   Pending Prescriptions Disp Refills     lisinopril (PRINIVIL/ZESTRIL) 2.5 MG tablet [Pharmacy Med Name: LISINOPRIL 2.5MG TABLETS] 90 tablet 0     Sig: TAKE 1 TABLET(2.5 MG) BY MOUTH DAILY       ACE Inhibitors (Including Combos) Protocol Passed - 5/6/2019  8:23 PM        Passed - Blood pressure under 140/90 in past 12 months     BP Readings from Last 3 Encounters:   04/15/19 118/69   04/09/19 146/52   03/22/19 136/61                 Passed - Recent (12 mo) or future (30 days) visit within the authorizing provider's specialty     Patient had office visit in the last 12 months or has a visit in the next 30 days with authorizing provider or within the authorizing provider's specialty.  See \"Patient Info\" tab in inbasket, or \"Choose Columns\" in Meds & Orders section of the refill encounter.              Passed - Medication is active on med list        Passed - Patient is age 18 or older        Passed - Normal serum creatinine on file in past 12 months     Recent Labs   Lab Test 04/08/19  1019  12/25/16  0051   CR 0.86   < >  --    CREAT  --   --  0.7    < > = values in this interval not displayed.             Passed - Normal serum potassium on file in past 12 months     Recent Labs   Lab Test 04/08/19  1019   POTASSIUM 4.9             MAGNESIUM-OXIDE 400 (241.3 Mg) MG tablet [Pharmacy Med Name: MAG-OXIDE 400MG TABLETS] 180 tablet 0     Sig: TAKE 1 TABLET BY MOUTH TWICE DAILY       There is no " "refill protocol information for this order        MAGNESIUM-OXIDE 400 (241.3 Mg) MG tablet 180 tablet 0 1/30/2019  No   Sig: TAKE 1 TABLET BY MOUTH TWICE DAILY     Last Written Prescription Date:  1-  Last Fill Quantity: 180,  # refills: 0   Last office visit: 3/22/2019 with prescribing provider:     Future Office Visit:   Next 5 appointments (look out 90 days)    May 30, 2019  6:30 PM CDT  Office Visit with Matt Morrissey MD  [facility] ([facility] [address] [facility] [id number] [phone]         Requested Prescriptions   Pending Prescriptions Disp Refills     lisinopril (PRINIVIL/ZESTRIL) 2.5 MG tablet [Pharmacy Med Name: LISINOPRIL 2.5MG TABLETS] 90 tablet 0     Sig: TAKE 1 TABLET(2.5 MG) BY MOUTH DAILY       ACE Inhibitors (Including Combos) Protocol Passed - 5/6/2019  8:23 PM        Passed - Blood pressure under 140/90 in past 12 months     BP Readings from Last 3 Encounters:   04/15/19 118/69   04/09/19 146/52   03/22/19 136/61                 Passed - Recent (12 mo) or future (30 days) visit within the authorizing provider's specialty     Patient had office visit in the last 12 months or has a visit in the next 30 days with authorizing provider or within the authorizing provider's specialty.  See \"Patient Info\" tab in inbasket, or \"Choose Columns\" in Meds & Orders section of the refill encounter.              Passed - Medication is active on med list        Passed - Patient is age 18 or older        Passed - Normal serum creatinine on file in past 12 months     Recent Labs   Lab Test 04/08/19  1019  12/25/16  0051   CR 0.86   < >  --    CREAT  --   --  0.7    < > = values in this interval not displayed.             Passed - Normal serum potassium on file in past 12 months     Recent Labs   Lab Test 04/08/19  1019   POTASSIUM 4.9             "

## 2019-05-14 DIAGNOSIS — E11.42 DM TYPE 2 WITH DIABETIC PERIPHERAL NEUROPATHY (H): ICD-10-CM

## 2019-05-14 NOTE — TELEPHONE ENCOUNTER
"insulin pen needle (BD JOE U/F) 32G X 4 MM miscellaneous      Last Written Prescription Date:  -  Last Fill Quantity: -,   # refills: -  Last Office Visit: 3/22/2019 (Yuni)  Future Office visit:    Next 5 appointments (look out 90 days)    May 30, 2019  6:30 PM CDT  Office Visit with Matt Morrissey MD  [facility] ([facility]) [address] [facility] [address]  [phone]           Routing refill request to provider for review/approval because:  Drug not active on patient's medication list    Requested Prescriptions   Pending Prescriptions Disp Refills     insulin pen needle (BD JOE U/F) 32G X 4 MM miscellaneous [Pharmacy Med Name: B-D PEN NDL JOE [id number](5/32) GRN] 300 each 0     Sig: USE FOUR TIMES DAILY OR AS DIRECTED       Diabetic Supplies Protocol Failed - 5/14/2019  8:01 AM        Failed - Medication is active on med list        Passed - Patient is 18 years of age or older        Passed - Recent (6 mo) or future (30 days) visit within the authorizing provider's specialty     Patient had office visit in the last 6 months or has a visit in the next 30 days with authorizing provider.  See \"Patient Info\" tab in inbasket, or \"Choose Columns\" in Meds & Orders section of the refill encounter.              "

## 2019-05-15 ENCOUNTER — OFFICE VISIT (OUTPATIENT)
Dept: CARDIOLOGY | Facility: CLINIC | Age: 84
End: 2019-05-15
Attending: NURSE PRACTITIONER
Payer: MEDICARE

## 2019-05-15 VITALS
SYSTOLIC BLOOD PRESSURE: 130 MMHG | HEART RATE: 65 BPM | BODY MASS INDEX: 23.62 KG/M2 | OXYGEN SATURATION: 95 % | HEIGHT: 70 IN | WEIGHT: 165 LBS | DIASTOLIC BLOOD PRESSURE: 48 MMHG

## 2019-05-15 DIAGNOSIS — R55 SYNCOPE, UNSPECIFIED SYNCOPE TYPE: ICD-10-CM

## 2019-05-15 DIAGNOSIS — I48.0 PAROXYSMAL ATRIAL FIBRILLATION (H): ICD-10-CM

## 2019-05-15 PROCEDURE — 99204 OFFICE O/P NEW MOD 45 MIN: CPT | Performed by: INTERNAL MEDICINE

## 2019-05-15 ASSESSMENT — MIFFLIN-ST. JEOR: SCORE: 1409.69

## 2019-05-15 NOTE — PROGRESS NOTES
HPI and Plan:   See dictation    No orders of the defined types were placed in this encounter.      No orders of the defined types were placed in this encounter.      There are no discontinued medications.      Encounter Diagnoses   Name Primary?     Syncope, unspecified syncope type      Paroxysmal atrial fibrillation (H)        CURRENT MEDICATIONS:  Current Outpatient Medications   Medication Sig Dispense Refill     AMITRIPTYLINE HCL PO Take 25 mg by mouth nightly as needed for sleep       aspirin EC 81 MG EC tablet Take 1 tablet (81 mg) by mouth daily 30 tablet 0     ATORVASTATIN CALCIUM PO Take 40 mg by mouth At Bedtime        bisacodyl (DULCOLAX) 10 MG Suppository Place 10 mg rectally daily as needed for constipation       blood glucose monitoring (NO BRAND SPECIFIED) meter device kit Use to test blood sugar bid times daily or as directed. 1 kit 0     blood glucose monitoring (TRUE METRIX BLOOD GLUCOSE TEST) test strip USE TO TEST BLOOD SUGAR THREE TIMES DAILY OR AS DIRECTED 300 strip 1     clopidogrel (PLAVIX) 75 MG tablet TAKE 1 TABLET BY MOUTH DAILY 90 tablet 2     DULoxetine (CYMBALTA) 30 MG EC capsule TAKE 1 CAPSULE(30 MG) BY MOUTH DAILY 90 capsule 3     insulin glargine (BASAGLAR KWIKPEN) 100 UNIT/ML pen Inject 22 Units Subcutaneous At Bedtime  15 mL 11     insulin pen needle (BD JOE U/F) 32G X 4 MM miscellaneous USE FOUR TIMES DAILY OR AS DIRECTED 350 each 3     ipratropium (ATROVENT) 0.03 % nasal spray INHALE 1 SPRAY IN EACH NOSTRIL EVERY 12 HOURS AS NEEDED 30 mL 11     lisinopril (PRINIVIL/ZESTRIL) 2.5 MG tablet TAKE 1 TABLET(2.5 MG) BY MOUTH DAILY 90 tablet 0     MAGNESIUM-OXIDE 400 (241.3 Mg) MG tablet TAKE 1 TABLET BY MOUTH TWICE DAILY 180 tablet 0     metFORMIN (GLUCOPHAGE) 1000 MG tablet TAKE 1 TABLET BY MOUTH TWICE DAILY WITH MEALS 180 tablet 3     metoprolol succinate ER (TOPROL-XL) 25 MG 24 hr tablet Take 12.5 mg by mouth At Bedtime       nitroGLYcerin (NITROSTAT) 0.4 MG sublingual tablet For  "chest pain place 1 tablet under the tongue every 5 minutes for 3 doses. If symptoms persist 5 minutes after 1st dose call 911. 25 tablet 0     omeprazole (PRILOSEC) 40 MG DR capsule TAKE 1 CAPSULE(40 MG) BY MOUTH DAILY 30 TO 60 MINUTES BEFORE A MEAL 90 capsule 3     order for DME Equipment being ordered: True Matrix Blood Glucose meter. 1 Act 11     polyethylene glycol (MIRALAX/GLYCOLAX) Packet Take 17 g by mouth daily as needed for constipation       tamsulosin (FLOMAX) 0.4 MG capsule Take 0.4 mg by mouth At Bedtime       lisinopril (PRINIVIL/ZESTRIL) 5 MG tablet Take 1 tablet (5 mg) by mouth daily (Patient not taking: Reported on 5/15/2019) 30 tablet 1       ALLERGIES     Allergies   Allergen Reactions     Oxycodone Other (See Comments)     \"TERRIBLE SWEATING\"     Sulfa Drugs      Tetracycline        PAST MEDICAL HISTORY:  Past Medical History:   Diagnosis Date     Aortic stenosis     mild     Benign essential hypertension 1/6/2017     Benign non-nodular prostatic hyperplasia with lower urinary tract symptoms 10/20/2016     CAD (coronary artery disease)     MI 1970     Carotid artery stenosis 10/20/2016    chronically occluded left internal carotid artery     Diabetes mellitus (H)      Gastro-oesophageal reflux disease      Heart attack (H)      Hyperlipidemia      Hypertension      Mild cognitive impairment 10/20/2016     Nephrolithiasis     RECURRENT     Osteopenia      PAD (peripheral artery disease) (H)     peripheral angiogram 5/2017: The common iliac artery stenoses were stented and the superficial femoral artery stenoses were angioplastied     Paroxysmal atrial fibrillation (H)      RBBB with left anterior fascicular block      Syncope      Unspecified cerebral artery occlusion with cerebral infarction      Ventricular tachycardia (H)     nonsustained       PAST SURGICAL HISTORY:  Past Surgical History:   Procedure Laterality Date     AMPUTATE FOOT Left 6/4/2017    Procedure: AMPUTATE FOOT;  Sun Add#3: " partial left foot amputation - Sagital Saw - Mini C-Arm;  Surgeon: Moe Kirk DPM;  Location:  OR     CHOLECYSTECTOMY       ENDARTERECTOMY CAROTID Right 1/3/2018    Procedure: ENDARTERECTOMY CAROTID;  RIGHT CAROTID ENDARTERECTOMY WITH EEG;  Surgeon: Roland Rodriguez MD;  Location:  OR     ESOPHAGOSCOPY, GASTROSCOPY, DUODENOSCOPY (EGD), COMBINED N/A 12/26/2016    Procedure: COMBINED ESOPHAGOSCOPY, GASTROSCOPY, DUODENOSCOPY (EGD);  Surgeon: Nasim Louis MD;  Location:  GI     GENITOURINARY SURGERY      KIDNEY STONE REMOVAL X 4     HC UGI ENDOSCOPY W EUS N/A 12/29/2016    Procedure: COMBINED ENDOSCOPIC ULTRASOUND, ESOPHAGOSCOPY, GASTROSCOPY, DUODENOSCOPY (EGD);  Surgeon: Nasim Louis MD;  Location:  GI     HERNIA REPAIR       INCISION AND DRAINAGE FOOT, COMBINED Left 6/6/2017    Procedure: COMBINED INCISION AND DRAINAGE FOOT;  REVISIONAL LEFT FOOT INCISION AND DRAINAGE WITH POSSIBLE FLAP CLOSURE , ANTIBIOTIC SOLUTION ;  Surgeon: Nabil Ann DPM;  Location:  OR     LASER HOLMIUM LITHOTRIPSY URETER(S), INSERT STENT, COMBINED  3/2/2012    Procedure:COMBINED CYSTOSCOPY, URETEROSCOPY, LASER HOLMIUM LITHOTRIPSY URETER(S), INSERT STENT; CYSTOSCOPY, RIGHT RETROGRADES, RIGHT URETEROSCOPY, HOLMIUM LASER, RIGHT STENT PLACEMENT; Surgeon:ANJELICA LEÓN; Location: OR     ROTATOR CUFF REPAIR RT/LT         FAMILY HISTORY:  Family History   Problem Relation Age of Onset     Breast Cancer Mother      Heart Failure Father 81       SOCIAL HISTORY:  Social History     Socioeconomic History     Marital status:      Spouse name: None     Number of children: None     Years of education: None     Highest education level: None   Occupational History     None   Social Needs     Financial resource strain: None     Food insecurity:     Worry: None     Inability: None     Transportation needs:     Medical: None     Non-medical: None   Tobacco Use     Smoking status: Former  "Smoker     Packs/day: 1.00     Years: 30.00     Pack years: 30.00     Types: Cigarettes     Last attempt to quit: 1999     Years since quittin.3     Smokeless tobacco: Never Used   Substance and Sexual Activity     Alcohol use: Not Currently     Alcohol/week: 4.2 oz     Types: 7 Standard drinks or equivalent per week     Comment: 1 drink per biweekly     Drug use: No     Sexual activity: Not Currently     Partners: Female   Lifestyle     Physical activity:     Days per week: None     Minutes per session: None     Stress: None   Relationships     Social connections:     Talks on phone: None     Gets together: None     Attends Yazidism service: None     Active member of club or organization: None     Attends meetings of clubs or organizations: None     Relationship status: None     Intimate partner violence:     Fear of current or ex partner: None     Emotionally abused: None     Physically abused: None     Forced sexual activity: None   Other Topics Concern     Parent/sibling w/ CABG, MI or angioplasty before 65F 55M? Not Asked   Social History Narrative     None       Review of Systems:  Skin:  Positive for bruising pt reports easy bruisng.   Eyes:  Positive for glasses    ENT:  Positive for hearing loss    Respiratory:  Negative cough;wheezing     Cardiovascular:  dizziness;lightheadedness;fatigue;palpitations;chest pain;Negative for Positive for;edema;syncope or near-syncope;lightheadedness;dizziness;fatigue pt reports pretty regular  Gastroenterology: Positive for heartburn;reflux on occ  Genitourinary:  Negative      Musculoskeletal:  Positive for arthritis    Neurologic:  Positive for stroke pt reports minor stroke within previous 2 years; Pt reports major stroke in 1999  Psychiatric:  Negative sleep disturbances    Heme/Lymph/Imm:  Negative      Endocrine:  Positive for diabetes      Physical Exam:  Vitals: /48   Pulse 65   Ht 1.778 m (5' 10\")   Wt 74.8 kg (165 lb)   SpO2 95%   BMI " 23.68 kg/m      Constitutional:  cooperative, alert and oriented, well developed, well nourished, in no acute distress        Skin:  warm and dry to the touch, no apparent skin lesions or masses noted          Head:  normocephalic, no masses or lesions        Eyes:  pupils equal and round;conjunctivae and lids unremarkable;sclera white        Lymph:      ENT:  no pallor or cyanosis, dentition good        Neck:  JVP normal   No L carotid pulse    Respiratory:  normal breath sounds, clear to auscultation, normal A-P diameter, normal symmetry, normal respiratory excursion, no use of accessory muscles         Cardiac: regular rhythm                  pulses below the femoral arteries are diminished                                      GI:  abdomen soft        Extremities and Muscular Skeletal:  no edema              Neurological:  no gross motor deficits        Psych:  Alert and Oriented x 3        CC  Catherine Laurent, APRN CNP  [facility]  [address]  [place] [id number]

## 2019-05-15 NOTE — LETTER
5/15/2019    Matt Morrissey MD  [address] [facility] [address]  [facility] [address]    RE: Dustin Pearce       Dear Colleague,    I had the pleasure of seeing Dustin Pearce in the [facility].    HPI and Plan:   See dictation    No orders of the defined types were placed in this encounter.      No orders of the defined types were placed in this encounter.      There are no discontinued medications.      Encounter Diagnoses   Name Primary?     Syncope, unspecified syncope type      Paroxysmal atrial fibrillation (H)        CURRENT MEDICATIONS:  Current Outpatient Medications   Medication Sig Dispense Refill     AMITRIPTYLINE HCL PO Take 25 mg by mouth nightly as needed for sleep       aspirin EC 81 MG EC tablet Take 1 tablet (81 mg) by mouth daily 30 tablet 0     ATORVASTATIN CALCIUM PO Take 40 mg by mouth At Bedtime        bisacodyl (DULCOLAX) 10 MG Suppository Place 10 mg rectally daily as needed for constipation       blood glucose monitoring (NO BRAND SPECIFIED) meter device kit Use to test blood sugar bid times daily or as directed. 1 kit 0     blood glucose monitoring (TRUE METRIX BLOOD GLUCOSE TEST) test strip USE TO TEST BLOOD SUGAR THREE TIMES DAILY OR AS DIRECTED 300 strip 1     clopidogrel (PLAVIX) 75 MG tablet TAKE 1 TABLET BY MOUTH DAILY 90 tablet 2     DULoxetine (CYMBALTA) 30 MG EC capsule TAKE 1 CAPSULE(30 MG) BY MOUTH DAILY 90 capsule 3     insulin glargine (BASAGLAR KWIKPEN) 100 UNIT/ML pen Inject 22 Units Subcutaneous At Bedtime  15 mL 11     insulin pen needle (BD JOE U/F) 32G X 4 MM miscellaneous USE FOUR TIMES DAILY OR AS DIRECTED 350 each 3     ipratropium (ATROVENT) 0.03 % nasal spray INHALE 1 SPRAY IN EACH NOSTRIL EVERY 12 HOURS AS NEEDED 30 mL 11     lisinopril (PRINIVIL/ZESTRIL) 2.5 MG tablet TAKE 1 TABLET(2.5 MG) BY MOUTH DAILY 90 tablet 0     MAGNESIUM-OXIDE 400 (241.3 Mg) MG tablet TAKE 1 TABLET BY MOUTH TWICE DAILY 180 tablet 0     metFORMIN (GLUCOPHAGE) 1000 MG  "tablet TAKE 1 TABLET BY MOUTH TWICE DAILY WITH MEALS 180 tablet 3     metoprolol succinate ER (TOPROL-XL) 25 MG 24 hr tablet Take 12.5 mg by mouth At Bedtime       nitroGLYcerin (NITROSTAT) 0.4 MG sublingual tablet For chest pain place 1 tablet under the tongue every 5 minutes for 3 doses. If symptoms persist 5 minutes after 1st dose call 911. 25 tablet 0     omeprazole (PRILOSEC) 40 MG DR capsule TAKE 1 CAPSULE(40 MG) BY MOUTH DAILY 30 TO 60 MINUTES BEFORE A MEAL 90 capsule 3     order for DME Equipment being ordered: True Matrix Blood Glucose meter. 1 Act 11     polyethylene glycol (MIRALAX/GLYCOLAX) Packet Take 17 g by mouth daily as needed for constipation       tamsulosin (FLOMAX) 0.4 MG capsule Take 0.4 mg by mouth At Bedtime       lisinopril (PRINIVIL/ZESTRIL) 5 MG tablet Take 1 tablet (5 mg) by mouth daily (Patient not taking: Reported on 5/15/2019) 30 tablet 1       ALLERGIES     Allergies   Allergen Reactions     Oxycodone Other (See Comments)     \"TERRIBLE SWEATING\"     Sulfa Drugs      Tetracycline        PAST MEDICAL HISTORY:  Past Medical History:   Diagnosis Date     Aortic stenosis     mild     Benign essential hypertension 1/6/2017     Benign non-nodular prostatic hyperplasia with lower urinary tract symptoms 10/20/2016     CAD (coronary artery disease)     MI 1970     Carotid artery stenosis 10/20/2016    chronically occluded left internal carotid artery     Diabetes mellitus (H)      Gastro-oesophageal reflux disease      Heart attack (H)      Hyperlipidemia      Hypertension      Mild cognitive impairment 10/20/2016     Nephrolithiasis     RECURRENT     Osteopenia      PAD (peripheral artery disease) (H)     peripheral angiogram 5/2017: The common iliac artery stenoses were stented and the superficial femoral artery stenoses were angioplastied     Paroxysmal atrial fibrillation (H)      RBBB with left anterior fascicular block      Syncope      Unspecified cerebral artery occlusion with cerebral " infarction      Ventricular tachycardia (H)     nonsustained       PAST SURGICAL HISTORY:  Past Surgical History:   Procedure Laterality Date     AMPUTATE FOOT Left 6/4/2017    Procedure: AMPUTATE FOOT;  Sun Add#3: partial left foot amputation - Sagital Saw - Mini C-Arm;  Surgeon: Moe Kirk DPM;  Location:  OR     CHOLECYSTECTOMY       ENDARTERECTOMY CAROTID Right 1/3/2018    Procedure: ENDARTERECTOMY CAROTID;  RIGHT CAROTID ENDARTERECTOMY WITH EEG;  Surgeon: Roland Rodriguez MD;  Location:  OR     ESOPHAGOSCOPY, GASTROSCOPY, DUODENOSCOPY (EGD), COMBINED N/A 12/26/2016    Procedure: COMBINED ESOPHAGOSCOPY, GASTROSCOPY, DUODENOSCOPY (EGD);  Surgeon: Nasim Louis MD;  Location:  GI     GENITOURINARY SURGERY      KIDNEY STONE REMOVAL X 4     HC UGI ENDOSCOPY W EUS N/A 12/29/2016    Procedure: COMBINED ENDOSCOPIC ULTRASOUND, ESOPHAGOSCOPY, GASTROSCOPY, DUODENOSCOPY (EGD);  Surgeon: Nasim Louis MD;  Location:  GI     HERNIA REPAIR       INCISION AND DRAINAGE FOOT, COMBINED Left 6/6/2017    Procedure: COMBINED INCISION AND DRAINAGE FOOT;  REVISIONAL LEFT FOOT INCISION AND DRAINAGE WITH POSSIBLE FLAP CLOSURE , ANTIBIOTIC SOLUTION ;  Surgeon: Nabil Ann DPM;  Location:  OR     LASER HOLMIUM LITHOTRIPSY URETER(S), INSERT STENT, COMBINED  3/2/2012    Procedure:COMBINED CYSTOSCOPY, URETEROSCOPY, LASER HOLMIUM LITHOTRIPSY URETER(S), INSERT STENT; CYSTOSCOPY, RIGHT RETROGRADES, RIGHT URETEROSCOPY, HOLMIUM LASER, RIGHT STENT PLACEMENT; Surgeon:ANJELICA LEÓN; Location: OR     ROTATOR CUFF REPAIR RT/LT         FAMILY HISTORY:  Family History   Problem Relation Age of Onset     Breast Cancer Mother      Heart Failure Father 81       SOCIAL HISTORY:  Social History     Socioeconomic History     Marital status:      Spouse name: None     Number of children: None     Years of education: None     Highest education level: None   Occupational History     None    Social Needs     Financial resource strain: None     Food insecurity:     Worry: None     Inability: None     Transportation needs:     Medical: None     Non-medical: None   Tobacco Use     Smoking status: Former Smoker     Packs/day: 1.00     Years: 30.00     Pack years: 30.00     Types: Cigarettes     Last attempt to quit: 1999     Years since quittin.3     Smokeless tobacco: Never Used   Substance and Sexual Activity     Alcohol use: Not Currently     Alcohol/week: 4.2 oz     Types: 7 Standard drinks or equivalent per week     Comment: 1 drink per biweekly     Drug use: No     Sexual activity: Not Currently     Partners: Female   Lifestyle     Physical activity:     Days per week: None     Minutes per session: None     Stress: None   Relationships     Social connections:     Talks on phone: None     Gets together: None     Attends Mu-ism service: None     Active member of club or organization: None     Attends meetings of clubs or organizations: None     Relationship status: None     Intimate partner violence:     Fear of current or ex partner: None     Emotionally abused: None     Physically abused: None     Forced sexual activity: None   Other Topics Concern     Parent/sibling w/ CABG, MI or angioplasty before 65F 55M? Not Asked   Social History Narrative     None       Review of Systems:  Skin:  Positive for bruising pt reports easy bruisng.   Eyes:  Positive for glasses    ENT:  Positive for hearing loss    Respiratory:  Negative cough;wheezing     Cardiovascular:  dizziness;lightheadedness;fatigue;palpitations;chest pain;Negative for Positive for;edema;syncope or near-syncope;lightheadedness;dizziness;fatigue pt reports pretty regular  Gastroenterology: Positive for heartburn;reflux on occ  Genitourinary:  Negative      Musculoskeletal:  Positive for arthritis    Neurologic:  Positive for stroke pt reports minor stroke within previous 2 years; Pt reports major stroke in 1999  Psychiatric:   "Negative sleep disturbances    Heme/Lymph/Imm:  Negative      Endocrine:  Positive for diabetes      Physical Exam:  Vitals: /48   Pulse 65   Ht 1.778 m (5' 10\")   Wt 74.8 kg (165 lb)   SpO2 95%   BMI 23.68 kg/m       Constitutional:  cooperative, alert and oriented, well developed, well nourished, in no acute distress        Skin:  warm and dry to the touch, no apparent skin lesions or masses noted          Head:  normocephalic, no masses or lesions        Eyes:  pupils equal and round;conjunctivae and lids unremarkable;sclera white        Lymph:      ENT:  no pallor or cyanosis, dentition good        Neck:  JVP normal   No L carotid pulse    Respiratory:  normal breath sounds, clear to auscultation, normal A-P diameter, normal symmetry, normal respiratory excursion, no use of accessory muscles         Cardiac: regular rhythm                  pulses below the femoral arteries are diminished                                      GI:  abdomen soft        Extremities and Muscular Skeletal:  no edema              Neurological:  no gross motor deficits        Psych:  Alert and Oriented x 3        CC  ASPEN Rosales [facility]  [address]  [place] [address]                Thank you for allowing me to participate in the care of your patient.      Sincerely,     Margot Ware MD     [facility]    cc:   ASPEN Rosales CNP  [facility]  [address]  [place] [address]        "

## 2019-05-15 NOTE — LETTER
5/15/2019      Matt Morrissey MD  [address] [facility] [address]  [facility] [address]      RE: Dustin Poncearik       Dear Colleague,    I had the pleasure of seeing Dustin Pearce in the [facility].    Service Date: 05/15/2019      CLINIC NOTE      HISTORY OF PRESENT ILLNESS:  I saw Mr. Pearce for evaluation of recurrent syncope.  He is a 89-year-old white male with a history of coronary artery disease and previous myocardial infarct.  He has ejection fraction of about 45%-50%.  He is also known to have mild aortic stenosis and EKG has been known to have right bundle branch block.  The patient has been having occasional occurrence of syncopal episodes.  He came to the clinic with his wife in a wheelchair.  The patient was very slow reacting and the history was primarily provided by his wife.  According to the wife, he has had 2 episodes of syncope this year, with the last one around April.  That occurred while the patient was in a sitting position.  He was out briefly for seconds and regained consciousness without loss of bladder or bowel control.  There was no injury.  The patient was taken to the emergency room and was subsequently discharged.  He had been put on a Zio Patch monitor which detected some nonsustained VT but no apparent bradycardia.  At the present time, he has no chest pain, shortness of breath or palpitation.  He reports occasional episodes of dizziness with change of positions.      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      PHYSICAL EXAMINATION:   VITAL SIGNS:  Blood pressure was 130/48, heart rate 65 beats per minute, body weight 165 pounds.   LUNGS:  Clear.   CARDIAC:  Rhythm was regular and the heart sounds were normal with no murmur.   ABDOMEN:  Examination showed no hepatomegaly.   EXTREMITIES:   There was no pedal edema.      ASSESSMENT AND RECOMMENDATIONS:  Mr. Aguiar is a 89-year-old white male with a history of coronary artery disease and peripheral vascular disease.  He had been having occasional episodes of syncope without explanation.  The Zio Patch monitor detected nonsustained VT but no signifiant bradycardia.  So far, the etiology of his syncope remains unexplained.  Possibility of the syncopal etiology could be hypotension, bradyarrhythmia or tachyarrhythmia.  The patient is full code but the wife stated that he is not ready to have a defibrillator.  For that purpose, I would not recommend an EP study.  I recommended a loop recorder implantation for confirmation of the true etiology of his syncopal spells.  That may help reduce future extensive evaluation when he has recurrent syncope.  Certainly, if the patient does not want to have a loop recorder, we will continue medical therapy without further intervention.  The patient's wife is going to confirm with this patient's son before a decision about whether he should have the implantable loop recorder implantation.  Otherwise, I do not recommend medication changes for the time being.      cc:   Matt Morrissey MD    [address], [facility] [address]   [place] [address]         ABRAHAM SYED MD             D: 05/15/2019   T: 05/15/2019   MT: CAMPBELL      Name:     SID AGUIAR   MRN:      [id number]        Account:      [id number]   :      1928           Service Date: 05/15/2019      Document: [id number]           Outpatient Encounter Medications as of 5/15/2019   Medication Sig Dispense Refill     AMITRIPTYLINE HCL PO Take 25 mg by mouth nightly as needed for sleep       aspirin EC 81 MG EC tablet Take 1 tablet (81 mg) by mouth daily 30 tablet 0     ATORVASTATIN CALCIUM PO Take 40 mg by mouth At Bedtime        bisacodyl (DULCOLAX) 10 MG Suppository Place 10 mg rectally daily as needed for constipation       blood glucose  monitoring (NO BRAND SPECIFIED) meter device kit Use to test blood sugar bid times daily or as directed. 1 kit 0     blood glucose monitoring (TRUE METRIX BLOOD GLUCOSE TEST) test strip USE TO TEST BLOOD SUGAR THREE TIMES DAILY OR AS DIRECTED 300 strip 1     clopidogrel (PLAVIX) 75 MG tablet TAKE 1 TABLET BY MOUTH DAILY 90 tablet 2     DULoxetine (CYMBALTA) 30 MG EC capsule TAKE 1 CAPSULE(30 MG) BY MOUTH DAILY 90 capsule 3     insulin glargine (BASAGLAR KWIKPEN) 100 UNIT/ML pen Inject 22 Units Subcutaneous At Bedtime  15 mL 11     insulin pen needle (BD JOE U/F) 32G X 4 MM miscellaneous USE FOUR TIMES DAILY OR AS DIRECTED 350 each 3     ipratropium (ATROVENT) 0.03 % nasal spray INHALE 1 SPRAY IN EACH NOSTRIL EVERY 12 HOURS AS NEEDED 30 mL 11     lisinopril (PRINIVIL/ZESTRIL) 2.5 MG tablet TAKE 1 TABLET(2.5 MG) BY MOUTH DAILY 90 tablet 0     MAGNESIUM-OXIDE 400 (241.3 Mg) MG tablet TAKE 1 TABLET BY MOUTH TWICE DAILY 180 tablet 0     metFORMIN (GLUCOPHAGE) 1000 MG tablet TAKE 1 TABLET BY MOUTH TWICE DAILY WITH MEALS 180 tablet 3     metoprolol succinate ER (TOPROL-XL) 25 MG 24 hr tablet Take 12.5 mg by mouth At Bedtime       nitroGLYcerin (NITROSTAT) 0.4 MG sublingual tablet For chest pain place 1 tablet under the tongue every 5 minutes for 3 doses. If symptoms persist 5 minutes after 1st dose call 911. 25 tablet 0     omeprazole (PRILOSEC) 40 MG DR capsule TAKE 1 CAPSULE(40 MG) BY MOUTH DAILY 30 TO 60 MINUTES BEFORE A MEAL 90 capsule 3     order for DME Equipment being ordered: True Matrix Blood Glucose meter. 1 Act 11     polyethylene glycol (MIRALAX/GLYCOLAX) Packet Take 17 g by mouth daily as needed for constipation       tamsulosin (FLOMAX) 0.4 MG capsule Take 0.4 mg by mouth At Bedtime       lisinopril (PRINIVIL/ZESTRIL) 5 MG tablet Take 1 tablet (5 mg) by mouth daily (Patient not taking: Reported on 5/15/2019) 30 tablet 1     No facility-administered encounter medications on file as of 5/15/2019.        Again,  thank you for allowing me to participate in the care of your patient.      Sincerely,    Margot Ware MD     [facility]

## 2019-05-15 NOTE — PROGRESS NOTES
Service Date: 05/15/2019      CLINIC NOTE      HISTORY OF PRESENT ILLNESS:  I saw Mr. Pearce for evaluation of recurrent syncope.  He is a 89-year-old white male with a history of coronary artery disease and previous myocardial infarct.  He has ejection fraction of about 45%-50%.  He is also known to have mild aortic stenosis and EKG has been known to have right bundle branch block.  The patient has been having occasional occurrence of syncopal episodes.  He came to the clinic with his wife in a wheelchair.  The patient was very slow reacting and the history was primarily provided by his wife.  According to the wife, he has had 2 episodes of syncope this year, with the last one around April.  That occurred while the patient was in a sitting position.  He was out briefly for seconds and regained consciousness without loss of bladder or bowel control.  There was no injury.  The patient was taken to the emergency room and was subsequently discharged.  He had been put on a Zio Patch monitor which detected some nonsustained VT but no apparent bradycardia.  At the present time, he has no chest pain, shortness of breath or palpitation.  He reports occasional episodes of dizziness with change of positions.      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      PHYSICAL EXAMINATION:   VITAL SIGNS:  Blood pressure was 130/48, heart rate 65 beats per minute, body weight 165 pounds.   LUNGS:  Clear.   CARDIAC:  Rhythm was regular and the heart sounds were normal with no murmur.   ABDOMEN:  Examination showed no hepatomegaly.   EXTREMITIES:  There was no pedal edema.      ASSESSMENT AND RECOMMENDATIONS:  Mr. Pearce is a 89-year-old white male with a history of coronary artery disease and peripheral vascular disease.  He had been having occasional episodes of  syncope without explanation.  The Zio Patch monitor detected nonsustained VT but no signifiant bradycardia.  So far, the etiology of his syncope remains unexplained.  Possibility of the syncopal etiology could be hypotension, bradyarrhythmia or tachyarrhythmia.  The patient is full code but the wife stated that he is not ready to have a defibrillator.  For that purpose, I would not recommend an EP study.  I recommended a loop recorder implantation for confirmation of the true etiology of his syncopal spells.  That may help reduce future extensive evaluation when he has recurrent syncope.  Certainly, if the patient does not want to have a loop recorder, we will continue medical therapy without further intervention.  The patient's wife is going to confirm with this patient's son before a decision about whether he should have the implantable loop recorder implantation.  Otherwise, I do not recommend medication changes for the time being.      cc:   Matt Morrissey MD    [address], [address] [place] [address]         ABRAHAM SYED MD             D: 05/15/2019   T: 05/15/2019   MT: CAMPBELL      Name:     SID AGUIAR   MRN:      [id number]        Account:      [id number]   :      1928           Service Date: 05/15/2019      Document: [id number]

## 2019-05-24 ENCOUNTER — TRANSFERRED RECORDS (OUTPATIENT)
Dept: HEALTH INFORMATION MANAGEMENT | Facility: CLINIC | Age: 84
End: 2019-05-24

## 2019-05-28 DIAGNOSIS — R05.9 COUGH: ICD-10-CM

## 2019-05-29 RX ORDER — CLOPIDOGREL BISULFATE 75 MG/1
TABLET ORAL
Qty: 90 TABLET | Refills: 1 | Status: ON HOLD | OUTPATIENT
Start: 2019-05-29 | End: 2019-01-01

## 2019-05-29 NOTE — TELEPHONE ENCOUNTER
"clopidogrel (PLAVIX) 75 MG tablet 90 tablet 2 8/27/2018     Last Written Prescription Date:  8/27/18  Last Fill Quantity: 90,  # refills: 2   Last office visit: 3/22/2019 with prescribing provider:  Yuni   Future Office Visit:   Next 5 appointments (look out 90 days)    May 30, 2019  6:30 PM CDT  Office Visit with Matt Morrissey MD  [facility] ([facility]) [address] [facility] [address] [phone]   Aug 13, 2019 10:15 AM CDT  Return Visit with Rafa Samuel MD  [facility] ([facility]) [address] [facility] [address]  [facility] [id number] [phone] OPT 2         Requested Prescriptions   Pending Prescriptions Disp Refills     clopidogrel (PLAVIX) 75 MG tablet [Pharmacy Med Name: CLOPIDOGREL 75MG TABLETS] 90 tablet 0     Sig: TAKE 1 TABLET BY MOUTH DAILY       Plavix Failed - 5/28/2019  3:12 AM        Failed - No active PPI on record unless is Protonix        Failed - Normal HGB on file in past 12 months     Recent Labs   Lab Test 04/08/19  1019   HGB 12.7*               Passed - Normal Platelets on file in past 12 months     Recent Labs   Lab Test 04/08/19  1019                  Passed - Recent (12 mo) or future (30 days) visit within the authorizing provider's specialty     Patient had office visit in the last 12 months or has a visit in the next 30 days with authorizing provider or within the authorizing provider's specialty.  See \"Patient Info\" tab in inbasket, or \"Choose Columns\" in Meds & Orders section of the refill encounter.              Passed - Medication is active on med list        Passed - Patient is age 18 or older        [facility] Follow-up to PHQ 1/6/2017 1/27/2017 11/30/2018   PHQ-9 9. Suicide Ideation past 2 weeks Not at all Not at all Not at all       "

## 2019-05-30 ENCOUNTER — OFFICE VISIT (OUTPATIENT)
Dept: FAMILY MEDICINE | Facility: CLINIC | Age: 84
End: 2019-05-30
Payer: MEDICARE

## 2019-05-30 VITALS
OXYGEN SATURATION: 96 % | HEART RATE: 79 BPM | WEIGHT: 170 LBS | HEIGHT: 70 IN | DIASTOLIC BLOOD PRESSURE: 64 MMHG | SYSTOLIC BLOOD PRESSURE: 126 MMHG | BODY MASS INDEX: 24.34 KG/M2 | TEMPERATURE: 97.6 F

## 2019-05-30 DIAGNOSIS — Z79.4 TYPE 2 DIABETES MELLITUS WITH HYPERGLYCEMIA, WITH LONG-TERM CURRENT USE OF INSULIN (H): Primary | ICD-10-CM

## 2019-05-30 DIAGNOSIS — R55 SYNCOPE, UNSPECIFIED SYNCOPE TYPE: ICD-10-CM

## 2019-05-30 DIAGNOSIS — I10 BENIGN ESSENTIAL HYPERTENSION: ICD-10-CM

## 2019-05-30 DIAGNOSIS — E11.65 TYPE 2 DIABETES MELLITUS WITH HYPERGLYCEMIA, WITH LONG-TERM CURRENT USE OF INSULIN (H): Primary | ICD-10-CM

## 2019-05-30 LAB — HBA1C MFR BLD: 8.8 % (ref 0–5.6)

## 2019-05-30 PROCEDURE — 83036 HEMOGLOBIN GLYCOSYLATED A1C: CPT | Performed by: INTERNAL MEDICINE

## 2019-05-30 PROCEDURE — 99214 OFFICE O/P EST MOD 30 MIN: CPT | Performed by: INTERNAL MEDICINE

## 2019-05-30 PROCEDURE — 36415 COLL VENOUS BLD VENIPUNCTURE: CPT | Performed by: INTERNAL MEDICINE

## 2019-05-30 RX ORDER — INSULIN GLARGINE 100 [IU]/ML
26 INJECTION, SOLUTION SUBCUTANEOUS AT BEDTIME
Qty: 15 ML | Refills: 11 | Status: ON HOLD | OUTPATIENT
Start: 2019-05-30 | End: 2020-01-01

## 2019-05-30 ASSESSMENT — MIFFLIN-ST. JEOR: SCORE: 1432.36

## 2019-05-30 NOTE — PROGRESS NOTES
Subjective     Dustin Pearce is a 89 year old male who presents to clinic today for the following health issues:    HPI   Follow up   89-year-old man here to follow-up on his diabetes.  He was hospitalized for a syncope spell.  He was briefly a phasic apparently.  He was seen by electrophysiology and neurology.  He was started on anticonvulsant therapy.  His wife states that his blood sugars became elevated in the hospital as they always do according to her.  She states that for the most part his a.m. fasting blood sugars are less than 150.  He has not had any low blood sugar readings.  She states that his p.m. blood sugars are generally in the 180s.  She did not bring his blood glucose log in today.  The patient feels well and is without specific complaints.  He saw the neurologist today and his Keppra dose was decreased because he has been feeling sleepy on that drug.  I do not have any records from the neurologist.    Patient Active Problem List   Diagnosis     Coronary artery disease of native artery of native heart with stable angina pectoris (H)     Hyperlipidemia, unspecified hyperlipidemia type     Benign non-nodular prostatic hyperplasia with lower urinary tract symptoms     Gastroesophageal reflux disease without esophagitis     Cerebrovascular accident (H)     Carotid artery stenosis     Abdominal aortic aneurysm (H)     Lumbar back pain     Benign essential hypertension     TIA (transient ischemic attack)     Paroxysmal atrial fibrillation (H)     Ischemic cardiomyopathy     Aortic root dilatation (H)     Physical deconditioning     Diabetic ulcer of left foot associated with diabetes mellitus due to underlying condition (H)     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)     Anemia due to blood loss, acute     Diarrhea, unspecified type     Nausea and vomiting, intractability of vomiting not specified, unspecified vomiting type     Acute pain of left shoulder     Balance problems     Generalized  muscle weakness     Peripheral artery disease (H)     Aortic stenosis     RBBB with left anterior fascicular block     Stroke of unknown etiology (H)     Carotid stenosis     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)     History of TIA (transient ischemic attack) and stroke     UTI (urinary tract infection)     UTI (urinary tract infection) due to Enterococcus     Generalized weakness     Type 2 diabetes mellitus with complication, with long-term current use of insulin (H)     Atherosclerosis of coronary artery of native heart without angina pectoris, unspecified vessel or lesion type     Depression, unspecified depression type     Slow transit constipation     Severe sepsis (H)     Chest discomfort     NSTEMI (non-ST elevated myocardial infarction) (H)     Anemia     Cognitive impairment     Acute on chronic systolic congestive heart failure (H)     Benign prostatic hyperplasia without lower urinary tract symptoms     Past Surgical History:   Procedure Laterality Date     AMPUTATE FOOT Left 6/4/2017    Procedure: AMPUTATE FOOT;  Sun Add#3: partial left foot amputation - Sagital Saw - Mini C-Arm;  Surgeon: Moe Kirk DPM;  Location:  OR     CHOLECYSTECTOMY       ENDARTERECTOMY CAROTID Right 1/3/2018    Procedure: ENDARTERECTOMY CAROTID;  RIGHT CAROTID ENDARTERECTOMY WITH EEG;  Surgeon: Roland Rodriguez MD;  Location:  OR     ESOPHAGOSCOPY, GASTROSCOPY, DUODENOSCOPY (EGD), COMBINED N/A 12/26/2016    Procedure: COMBINED ESOPHAGOSCOPY, GASTROSCOPY, DUODENOSCOPY (EGD);  Surgeon: Nasim Louis MD;  Location:  GI     GENITOURINARY SURGERY      KIDNEY STONE REMOVAL X 4     [facility] UGI ENDOSCOPY W EUS N/A 12/29/2016    Procedure: COMBINED ENDOSCOPIC ULTRASOUND, ESOPHAGOSCOPY, GASTROSCOPY, DUODENOSCOPY (EGD);  Surgeon: Nasim Louis MD;  Location:  GI     HERNIA REPAIR       INCISION AND DRAINAGE FOOT, COMBINED Left 6/6/2017    Procedure: COMBINED INCISION  AND DRAINAGE FOOT;  REVISIONAL LEFT FOOT INCISION AND DRAINAGE WITH POSSIBLE FLAP CLOSURE , ANTIBIOTIC SOLUTION ;  Surgeon: Nabil Ann DPM;  Location: [facility] OR     LASER HOLMIUM LITHOTRIPSY URETER(S), INSERT STENT, COMBINED  3/2/2012    Procedure:COMBINED CYSTOSCOPY, URETEROSCOPY, LASER HOLMIUM LITHOTRIPSY URETER(S), INSERT STENT; CYSTOSCOPY, RIGHT RETROGRADES, RIGHT URETEROSCOPY, HOLMIUM LASER, RIGHT STENT PLACEMENT; Surgeon:ANJELICA LEÓN; Location:[facility] OR     ROTATOR CUFF REPAIR RT/LT         Social History     Tobacco Use     Smoking status: Former Smoker     Packs/day: 1.00     Years: 30.00     Pack years: 30.00     Types: Cigarettes     Last attempt to quit: 1999     Years since quittin.4     Smokeless tobacco: Never Used   Substance Use Topics     Alcohol use: Not Currently     Alcohol/week: 4.2 oz     Types: 7 Standard drinks or equivalent per week     Comment: 1 drink per biweekly     Family History   Problem Relation Age of Onset     Breast Cancer Mother      Heart Failure Father 81         Current Outpatient Medications   Medication Sig Dispense Refill     AMITRIPTYLINE HCL PO Take 25 mg by mouth nightly as needed for sleep       aspirin EC 81 MG EC tablet Take 1 tablet (81 mg) by mouth daily 30 tablet 0     ATORVASTATIN CALCIUM PO Take 40 mg by mouth At Bedtime        bisacodyl (DULCOLAX) 10 MG Suppository Place 10 mg rectally daily as needed for constipation       blood glucose monitoring (NO BRAND SPECIFIED) meter device kit Use to test blood sugar bid times daily or as directed. 1 kit 0     blood glucose monitoring (TRUE METRIX BLOOD GLUCOSE TEST) test strip USE TO TEST BLOOD SUGAR THREE TIMES DAILY OR AS DIRECTED 300 strip 1     clopidogrel (PLAVIX) 75 MG tablet TAKE 1 TABLET BY MOUTH DAILY 90 tablet 1     DULoxetine (CYMBALTA) 30 MG EC capsule TAKE 1 CAPSULE(30 MG) BY MOUTH DAILY 90 capsule 3     insulin glargine (BASAGLAR KWIKPEN) 100 UNIT/ML pen Inject 26 Units Subcutaneous At  "Bedtime 15 mL 11     insulin pen needle (BD JOE U/F) 32G X 4 MM miscellaneous USE FOUR TIMES DAILY OR AS DIRECTED 350 each 3     ipratropium (ATROVENT) 0.03 % nasal spray INHALE 1 SPRAY IN EACH NOSTRIL EVERY 12 HOURS AS NEEDED 30 mL 11     lisinopril (PRINIVIL/ZESTRIL) 2.5 MG tablet TAKE 1 TABLET(2.5 MG) BY MOUTH DAILY 90 tablet 0     lisinopril (PRINIVIL/ZESTRIL) 5 MG tablet Take 1 tablet (5 mg) by mouth daily 30 tablet 1     MAGNESIUM-OXIDE 400 (241.3 Mg) MG tablet TAKE 1 TABLET BY MOUTH TWICE DAILY 180 tablet 0     metFORMIN (GLUCOPHAGE) 1000 MG tablet TAKE 1 TABLET BY MOUTH TWICE DAILY WITH MEALS 180 tablet 3     metoprolol succinate ER (TOPROL-XL) 25 MG 24 hr tablet Take 12.5 mg by mouth At Bedtime       nitroGLYcerin (NITROSTAT) 0.4 MG sublingual tablet For chest pain place 1 tablet under the tongue every 5 minutes for 3 doses. If symptoms persist 5 minutes after 1st dose call 911. 25 tablet 0     omeprazole (PRILOSEC) 40 MG DR capsule TAKE 1 CAPSULE(40 MG) BY MOUTH DAILY 30 TO 60 MINUTES BEFORE A MEAL 90 capsule 3     order for DME Equipment being ordered: True Matrix Blood Glucose meter. 1 Act 11     polyethylene glycol (MIRALAX/GLYCOLAX) Packet Take 17 g by mouth daily as needed for constipation       tamsulosin (FLOMAX) 0.4 MG capsule Take 0.4 mg by mouth At Bedtime       Allergies   Allergen Reactions     Oxycodone Other (See Comments)     \"TERRIBLE SWEATING\"     Sulfa Drugs      Tetracycline        Reviewed and updated as needed this visit by Provider         Review of Systems   ROS COMP: Constitutional, HEENT, cardiovascular, pulmonary, gi and gu systems are negative, except as otherwise noted.      Objective    /62 (BP Location: Right arm, Cuff Size: Adult Regular)   Pulse 79   Temp 97.6  F (36.4  C) (Oral)   Ht 1.778 m (5' 10\")   Wt 77.1 kg (170 lb)   SpO2 96%   BMI 24.39 kg/m    Body mass index is 24.39 kg/m .  Physical Exam   GENERAL: healthy, alert and no distress    Diagnostic Test " "Results:  Labs reviewed in Epic  Results for orders placed or performed in visit on 05/30/19   Hemoglobin A1c   Result Value Ref Range    Hemoglobin A1C 8.8 (H) 0 - 5.6 %           Assessment & Plan     1. Type 2 diabetes mellitus with hyperglycemia, with long-term current use of insulin (H)  2. Syncope  3.  Benign essential hypertension  Diabetes remains uncontrolled, recommended increasing Lantus insulin from 22 units to 26 units.  Patient's wife will contact me of blood sugars in the morning are less than 90.  Goal is to have blood sugars less than 150 consistently in the morning.  If the insulin dose change does not affect the echo, she will contact me.  I reminded her to return with the blood glucose log at next visit to help with insulin dosing.  We also discussed diet considerations including reducing soda pop consumption.  The patient likes to drink \"Squirt\".   He will follow-up with neurology as directed regarding his a phasic spell which potentially was thought to represent seizure activity.  There was discussion about atrial fibrillation during his hospitalization, but he did have a recent Holter monitor that showed nonsustained V. tach SVT but no specific mention of atrial fibrillation.  A loop recorder was offered.  Blood pressure was under good control in clinic today and second check.  - insulin glargine (BASAGLAR KWIKPEN) 100 UNIT/ML pen; Inject 26 Units Subcutaneous At Bedtime  Dispense: 15 mL; Refill: 11       Total face to face contact time was greater than 26 minutes of which more than 50% of this time was spent counseling and coordinating care regarding the above topics.      Return in about 3 months (around 8/30/2019) for Diabetes Check.    Matt Morrissey MD  [facility]    "

## 2019-06-30 ENCOUNTER — HOSPITAL ENCOUNTER (OUTPATIENT)
Facility: CLINIC | Age: 84
Setting detail: OBSERVATION
Discharge: HOME | End: 2019-07-01
Attending: EMERGENCY MEDICINE | Admitting: HOSPITALIST
Payer: MEDICARE

## 2019-06-30 ENCOUNTER — APPOINTMENT (OUTPATIENT)
Dept: CT IMAGING | Facility: CLINIC | Age: 84
End: 2019-06-30
Attending: EMERGENCY MEDICINE
Payer: MEDICARE

## 2019-06-30 DIAGNOSIS — R47.89 SLOW RATE OF SPEECH: ICD-10-CM

## 2019-06-30 DIAGNOSIS — G40.909 SEIZURE DISORDER (H): Primary | ICD-10-CM

## 2019-06-30 DIAGNOSIS — E11.42 DM TYPE 2 WITH DIABETIC PERIPHERAL NEUROPATHY (H): ICD-10-CM

## 2019-06-30 LAB
BASOPHILS # BLD AUTO: 0 10E9/L (ref 0–0.2)
BASOPHILS NFR BLD AUTO: 0.1 %
DIFFERENTIAL METHOD BLD: ABNORMAL
EOSINOPHIL # BLD AUTO: 0.1 10E9/L (ref 0–0.7)
EOSINOPHIL NFR BLD AUTO: 1.4 %
ERYTHROCYTE [DISTWIDTH] IN BLOOD BY AUTOMATED COUNT: 14.2 % (ref 10–15)
HCT VFR BLD AUTO: 39.1 % (ref 40–53)
HGB BLD-MCNC: 12.4 G/DL (ref 13.3–17.7)
IMM GRANULOCYTES # BLD: 0 10E9/L (ref 0–0.4)
IMM GRANULOCYTES NFR BLD: 0.3 %
LYMPHOCYTES # BLD AUTO: 2.7 10E9/L (ref 0.8–5.3)
LYMPHOCYTES NFR BLD AUTO: 37.3 %
MCH RBC QN AUTO: 28.6 PG (ref 26.5–33)
MCHC RBC AUTO-ENTMCNC: 31.7 G/DL (ref 31.5–36.5)
MCV RBC AUTO: 90 FL (ref 78–100)
MONOCYTES # BLD AUTO: 0.7 10E9/L (ref 0–1.3)
MONOCYTES NFR BLD AUTO: 10 %
NEUTROPHILS # BLD AUTO: 3.6 10E9/L (ref 1.6–8.3)
NEUTROPHILS NFR BLD AUTO: 50.9 %
NRBC # BLD AUTO: 0 10*3/UL
NRBC BLD AUTO-RTO: 0 /100
PLATELET # BLD AUTO: 189 10E9/L (ref 150–450)
RBC # BLD AUTO: 4.34 10E12/L (ref 4.4–5.9)
WBC # BLD AUTO: 7.1 10E9/L (ref 4–11)

## 2019-06-30 PROCEDURE — 80048 BASIC METABOLIC PNL TOTAL CA: CPT | Performed by: EMERGENCY MEDICINE

## 2019-06-30 PROCEDURE — 99285 EMERGENCY DEPT VISIT HI MDM: CPT | Mod: 25

## 2019-06-30 PROCEDURE — 93005 ELECTROCARDIOGRAM TRACING: CPT

## 2019-06-30 PROCEDURE — 70498 CT ANGIOGRAPHY NECK: CPT

## 2019-06-30 PROCEDURE — 85610 PROTHROMBIN TIME: CPT | Performed by: EMERGENCY MEDICINE

## 2019-06-30 PROCEDURE — 85730 THROMBOPLASTIN TIME PARTIAL: CPT | Performed by: EMERGENCY MEDICINE

## 2019-06-30 PROCEDURE — 84484 ASSAY OF TROPONIN QUANT: CPT | Performed by: EMERGENCY MEDICINE

## 2019-06-30 PROCEDURE — 70450 CT HEAD/BRAIN W/O DYE: CPT

## 2019-06-30 PROCEDURE — 85025 COMPLETE CBC W/AUTO DIFF WBC: CPT | Performed by: EMERGENCY MEDICINE

## 2019-06-30 RX ORDER — IOPAMIDOL 755 MG/ML
120 INJECTION, SOLUTION INTRAVASCULAR ONCE
Status: COMPLETED | OUTPATIENT
Start: 2019-06-30 | End: 2019-07-01

## 2019-06-30 ASSESSMENT — ENCOUNTER SYMPTOMS
SPEECH DIFFICULTY: 1
ABDOMINAL PAIN: 1
DYSURIA: 0

## 2019-07-01 ENCOUNTER — APPOINTMENT (OUTPATIENT)
Dept: CT IMAGING | Facility: CLINIC | Age: 84
End: 2019-07-01
Attending: EMERGENCY MEDICINE
Payer: MEDICARE

## 2019-07-01 ENCOUNTER — APPOINTMENT (OUTPATIENT)
Dept: MRI IMAGING | Facility: CLINIC | Age: 84
End: 2019-07-01
Attending: PSYCHIATRY & NEUROLOGY
Payer: MEDICARE

## 2019-07-01 VITALS
DIASTOLIC BLOOD PRESSURE: 52 MMHG | HEART RATE: 70 BPM | TEMPERATURE: 96.3 F | OXYGEN SATURATION: 95 % | WEIGHT: 156.3 LBS | SYSTOLIC BLOOD PRESSURE: 114 MMHG | RESPIRATION RATE: 16 BRPM | BODY MASS INDEX: 22.43 KG/M2

## 2019-07-01 LAB
ALBUMIN UR-MCNC: NEGATIVE MG/DL
ANION GAP SERPL CALCULATED.3IONS-SCNC: 5 MMOL/L (ref 3–14)
APPEARANCE UR: CLEAR
APTT PPP: 31 SEC (ref 22–37)
BILIRUB UR QL STRIP: NEGATIVE
BUN SERPL-MCNC: 26 MG/DL (ref 7–30)
CALCIUM SERPL-MCNC: 9.1 MG/DL (ref 8.5–10.1)
CHLORIDE SERPL-SCNC: 106 MMOL/L (ref 94–109)
CO2 SERPL-SCNC: 29 MMOL/L (ref 20–32)
COLOR UR AUTO: ABNORMAL
CREAT SERPL-MCNC: 0.85 MG/DL (ref 0.66–1.25)
GFR SERPL CREATININE-BSD FRML MDRD: 76 ML/MIN/{1.73_M2}
GLUCOSE BLDC GLUCOMTR-MCNC: 122 MG/DL (ref 70–99)
GLUCOSE BLDC GLUCOMTR-MCNC: 123 MG/DL (ref 70–99)
GLUCOSE BLDC GLUCOMTR-MCNC: 136 MG/DL (ref 70–99)
GLUCOSE SERPL-MCNC: 168 MG/DL (ref 70–99)
GLUCOSE UR STRIP-MCNC: NEGATIVE MG/DL
HBA1C MFR BLD: 8.3 % (ref 0–5.6)
HGB UR QL STRIP: ABNORMAL
INR PPP: 1.01 (ref 0.86–1.14)
INTERPRETATION ECG - MUSE: NORMAL
KETONES UR STRIP-MCNC: NEGATIVE MG/DL
LEUKOCYTE ESTERASE UR QL STRIP: NEGATIVE
MUCOUS THREADS #/AREA URNS LPF: PRESENT /LPF
NITRATE UR QL: NEGATIVE
PH UR STRIP: 6.5 PH (ref 5–7)
POTASSIUM SERPL-SCNC: 5.1 MMOL/L (ref 3.4–5.3)
RBC #/AREA URNS AUTO: 30 /HPF (ref 0–2)
SODIUM SERPL-SCNC: 140 MMOL/L (ref 133–144)
SOURCE: ABNORMAL
SP GR UR STRIP: 1 (ref 1–1.03)
SQUAMOUS #/AREA URNS AUTO: 1 /HPF (ref 0–1)
TROPONIN I SERPL-MCNC: <0.015 UG/L (ref 0–0.04)
UROBILINOGEN UR STRIP-MCNC: NORMAL MG/DL (ref 0–2)
WBC #/AREA URNS AUTO: 1 /HPF (ref 0–5)

## 2019-07-01 PROCEDURE — 25000131 ZZH RX MED GY IP 250 OP 636 PS 637: Mod: GY | Performed by: HOSPITALIST

## 2019-07-01 PROCEDURE — A9585 GADOBUTROL INJECTION: HCPCS | Performed by: HOSPITALIST

## 2019-07-01 PROCEDURE — 99214 OFFICE O/P EST MOD 30 MIN: CPT | Performed by: PSYCHIATRY & NEUROLOGY

## 2019-07-01 PROCEDURE — 25000132 ZZH RX MED GY IP 250 OP 250 PS 637: Mod: GY | Performed by: HOSPITALIST

## 2019-07-01 PROCEDURE — 25000125 ZZHC RX 250: Performed by: EMERGENCY MEDICINE

## 2019-07-01 PROCEDURE — 80177 DRUG SCRN QUAN LEVETIRACETAM: CPT | Performed by: PSYCHIATRY & NEUROLOGY

## 2019-07-01 PROCEDURE — 70553 MRI BRAIN STEM W/O & W/DYE: CPT

## 2019-07-01 PROCEDURE — 36415 COLL VENOUS BLD VENIPUNCTURE: CPT | Performed by: HOSPITALIST

## 2019-07-01 PROCEDURE — 0042T CT HEAD PERFUSION WITH CONTRAST: CPT

## 2019-07-01 PROCEDURE — G0378 HOSPITAL OBSERVATION PER HR: HCPCS

## 2019-07-01 PROCEDURE — 00000146 ZZHCL STATISTIC GLUCOSE BY METER IP

## 2019-07-01 PROCEDURE — 99236 HOSP IP/OBS SAME DATE HI 85: CPT | Performed by: HOSPITALIST

## 2019-07-01 PROCEDURE — 81001 URINALYSIS AUTO W/SCOPE: CPT | Performed by: EMERGENCY MEDICINE

## 2019-07-01 PROCEDURE — 25500064 ZZH RX 255 OP 636: Performed by: HOSPITALIST

## 2019-07-01 PROCEDURE — 70498 CT ANGIOGRAPHY NECK: CPT

## 2019-07-01 PROCEDURE — 83036 HEMOGLOBIN GLYCOSYLATED A1C: CPT | Performed by: HOSPITALIST

## 2019-07-01 PROCEDURE — 25000128 H RX IP 250 OP 636: Performed by: EMERGENCY MEDICINE

## 2019-07-01 PROCEDURE — 96372 THER/PROPH/DIAG INJ SC/IM: CPT | Mod: XS

## 2019-07-01 RX ORDER — CLOPIDOGREL BISULFATE 75 MG/1
75 TABLET ORAL DAILY
Status: DISCONTINUED | OUTPATIENT
Start: 2019-07-01 | End: 2019-07-01 | Stop reason: HOSPADM

## 2019-07-01 RX ORDER — LISINOPRIL 5 MG/1
5 TABLET ORAL DAILY
Status: DISCONTINUED | OUTPATIENT
Start: 2019-07-01 | End: 2019-07-01

## 2019-07-01 RX ORDER — LIDOCAINE 40 MG/G
CREAM TOPICAL
Status: DISCONTINUED | OUTPATIENT
Start: 2019-07-01 | End: 2019-07-01 | Stop reason: HOSPADM

## 2019-07-01 RX ORDER — LEVETIRACETAM 500 MG/1
500 TABLET ORAL AT BEDTIME
Status: DISCONTINUED | OUTPATIENT
Start: 2019-07-01 | End: 2019-07-01 | Stop reason: HOSPADM

## 2019-07-01 RX ORDER — LISINOPRIL 2.5 MG/1
2.5 TABLET ORAL DAILY
Status: DISCONTINUED | OUTPATIENT
Start: 2019-07-02 | End: 2019-07-01 | Stop reason: HOSPADM

## 2019-07-01 RX ORDER — ASPIRIN 81 MG/1
81 TABLET ORAL DAILY
Status: DISCONTINUED | OUTPATIENT
Start: 2019-07-01 | End: 2019-07-01 | Stop reason: HOSPADM

## 2019-07-01 RX ORDER — ACETAMINOPHEN 325 MG/1
650 TABLET ORAL EVERY 4 HOURS PRN
Status: DISCONTINUED | OUTPATIENT
Start: 2019-07-01 | End: 2019-07-01 | Stop reason: HOSPADM

## 2019-07-01 RX ORDER — NICOTINE POLACRILEX 4 MG
15-30 LOZENGE BUCCAL
Status: DISCONTINUED | OUTPATIENT
Start: 2019-07-01 | End: 2019-07-01 | Stop reason: HOSPADM

## 2019-07-01 RX ORDER — DULOXETIN HYDROCHLORIDE 30 MG/1
30 CAPSULE, DELAYED RELEASE ORAL DAILY
Status: DISCONTINUED | OUTPATIENT
Start: 2019-07-01 | End: 2019-07-01 | Stop reason: HOSPADM

## 2019-07-01 RX ORDER — ONDANSETRON 2 MG/ML
4 INJECTION INTRAMUSCULAR; INTRAVENOUS EVERY 6 HOURS PRN
Status: DISCONTINUED | OUTPATIENT
Start: 2019-07-01 | End: 2019-07-01 | Stop reason: HOSPADM

## 2019-07-01 RX ORDER — LEVETIRACETAM 250 MG/1
250 TABLET ORAL EVERY MORNING
Status: ON HOLD | COMMUNITY
End: 2019-07-01

## 2019-07-01 RX ORDER — ONDANSETRON 4 MG/1
4 TABLET, ORALLY DISINTEGRATING ORAL EVERY 6 HOURS PRN
Status: DISCONTINUED | OUTPATIENT
Start: 2019-07-01 | End: 2019-07-01 | Stop reason: HOSPADM

## 2019-07-01 RX ORDER — DEXTROSE MONOHYDRATE 25 G/50ML
25-50 INJECTION, SOLUTION INTRAVENOUS
Status: DISCONTINUED | OUTPATIENT
Start: 2019-07-01 | End: 2019-07-01 | Stop reason: HOSPADM

## 2019-07-01 RX ORDER — ATORVASTATIN CALCIUM 40 MG/1
40 TABLET, FILM COATED ORAL AT BEDTIME
Status: DISCONTINUED | OUTPATIENT
Start: 2019-07-01 | End: 2019-07-01 | Stop reason: HOSPADM

## 2019-07-01 RX ORDER — TAMSULOSIN HYDROCHLORIDE 0.4 MG/1
0.4 CAPSULE ORAL AT BEDTIME
Status: DISCONTINUED | OUTPATIENT
Start: 2019-07-01 | End: 2019-07-01 | Stop reason: HOSPADM

## 2019-07-01 RX ORDER — LEVETIRACETAM 250 MG/1
250 TABLET ORAL DAILY
Status: DISCONTINUED | OUTPATIENT
Start: 2019-07-02 | End: 2019-07-01 | Stop reason: HOSPADM

## 2019-07-01 RX ORDER — LEVETIRACETAM 500 MG/1
500 TABLET ORAL 2 TIMES DAILY
Qty: 60 TABLET | Refills: 0 | Status: ON HOLD | OUTPATIENT
Start: 2019-07-01 | End: 2019-07-19

## 2019-07-01 RX ORDER — ACETAMINOPHEN 650 MG/1
650 SUPPOSITORY RECTAL EVERY 4 HOURS PRN
Status: DISCONTINUED | OUTPATIENT
Start: 2019-07-01 | End: 2019-07-01 | Stop reason: HOSPADM

## 2019-07-01 RX ORDER — LEVETIRACETAM 250 MG/1
250 TABLET ORAL 2 TIMES DAILY
Status: ON HOLD | COMMUNITY
End: 2019-07-01

## 2019-07-01 RX ORDER — LEVETIRACETAM 250 MG/1
500 TABLET ORAL AT BEDTIME
Status: ON HOLD | COMMUNITY
End: 2019-07-01

## 2019-07-01 RX ORDER — LEVETIRACETAM 250 MG/1
250 TABLET ORAL 2 TIMES DAILY
Status: DISCONTINUED | OUTPATIENT
Start: 2019-07-01 | End: 2019-07-01

## 2019-07-01 RX ORDER — NALOXONE HYDROCHLORIDE 0.4 MG/ML
.1-.4 INJECTION, SOLUTION INTRAMUSCULAR; INTRAVENOUS; SUBCUTANEOUS
Status: DISCONTINUED | OUTPATIENT
Start: 2019-07-01 | End: 2019-07-01 | Stop reason: HOSPADM

## 2019-07-01 RX ORDER — GADOBUTROL 604.72 MG/ML
0.1 INJECTION INTRAVENOUS ONCE
Status: COMPLETED | OUTPATIENT
Start: 2019-07-01 | End: 2019-07-01

## 2019-07-01 RX ADMIN — TAMSULOSIN HYDROCHLORIDE 0.4 MG: 0.4 CAPSULE ORAL at 04:45

## 2019-07-01 RX ADMIN — IOPAMIDOL 70 ML: 755 INJECTION, SOLUTION INTRAVENOUS at 00:18

## 2019-07-01 RX ADMIN — ATORVASTATIN CALCIUM 40 MG: 40 TABLET, FILM COATED ORAL at 04:45

## 2019-07-01 RX ADMIN — CLOPIDOGREL BISULFATE 75 MG: 75 TABLET ORAL at 07:55

## 2019-07-01 RX ADMIN — INSULIN GLARGINE 26 UNITS: 100 INJECTION, SOLUTION SUBCUTANEOUS at 05:10

## 2019-07-01 RX ADMIN — SODIUM CHLORIDE 100 ML: 9 INJECTION, SOLUTION INTRAVENOUS at 00:18

## 2019-07-01 RX ADMIN — ASPIRIN 81 MG: 81 TABLET, COATED ORAL at 07:55

## 2019-07-01 RX ADMIN — LEVETIRACETAM 250 MG: 250 TABLET, FILM COATED ORAL at 07:55

## 2019-07-01 RX ADMIN — GADOBUTROL 7.1 ML: 604.72 INJECTION INTRAVENOUS at 15:06

## 2019-07-01 RX ADMIN — METOPROLOL SUCCINATE 12.5 MG: 25 TABLET, EXTENDED RELEASE ORAL at 04:45

## 2019-07-01 RX ADMIN — DULOXETINE HYDROCHLORIDE 30 MG: 30 CAPSULE, DELAYED RELEASE ORAL at 07:54

## 2019-07-01 ASSESSMENT — ENCOUNTER SYMPTOMS
SHORTNESS OF BREATH: 0
FEVER: 0

## 2019-07-01 NOTE — PROGRESS NOTES
hospitalist brief note:    Patient admitted overnight by Dr. Brooks, see H & P for detail. 89 y/o M with hx of recent syncope suspected to be due to seizure on keppra, CAD s/p recent stent placement, paroxysmal a.fib, diabetes, chronic left carotid artery occlusion who presented with difficulty with speaking and slurred speech. Symptoms have resolved. CT and CTA head/neck negative for acute finding, but does show chronic infarct of the left frontal lobe without change.  Wife is at bedside and reports speech at baseline. Patient denies new focal weakness. He is currently going to MRI.  Neuro consult is pending.  Adjusted Lisinopril and Keppra to PTA dosing.     Nils Ricks MD  Hospitalist

## 2019-07-01 NOTE — PHARMACY-ADMISSION MEDICATION HISTORY
Admission medication history interview status for the 6/30/2019  admission is complete. See EPIC admission navigator for prior to admission medications     Medication history source reliability:Good    Actions taken by pharmacist (provider contacted, etc): Patient interviewed, Epic records consulted.     Additional medication history information not noted on PTA med list :None    Medication reconciliation/reorder completed by provider prior to medication history? Yes, sent page to MD regarding Keppra and Lisinopril doses.    Time spent in this activity: 20 minutes    Prior to Admission medications    Medication Sig Last Dose Taking? Auth Provider   aspirin EC 81 MG EC tablet Take 1 tablet (81 mg) by mouth daily 6/30/2019 at Unknown time Yes Tra Reynoso MD   ATORVASTATIN CALCIUM PO Take 40 mg by mouth At Bedtime  6/30/2019 at Unknown time Yes Reported, Patient   bisacodyl (DULCOLAX) 10 MG Suppository Place 10 mg rectally daily as needed for constipation as needed Yes Reported, Patient   clopidogrel (PLAVIX) 75 MG tablet TAKE 1 TABLET BY MOUTH DAILY 6/30/2019 at Unknown time Yes Matt Morrissey MD   DULoxetine (CYMBALTA) 30 MG EC capsule TAKE 1 CAPSULE(30 MG) BY MOUTH DAILY 6/30/2019 at Unknown time Yes Matt Morrissey MD   insulin glargine (BASAGLAR KWIKPEN) 100 UNIT/ML pen Inject 26 Units Subcutaneous At Bedtime 6/30/2019 at Unknown time Yes Matt Morrissey MD   ipratropium (ATROVENT) 0.03 % nasal spray INHALE 1 SPRAY IN EACH NOSTRIL EVERY 12 HOURS AS NEEDED as needed Yes Matt Morrissey MD   levETIRAcetam (KEPPRA) 250 MG tablet Take 250 mg by mouth every morning 6/30/2019 at Unknown time Yes Unknown, Entered By History   levETIRAcetam (KEPPRA) 250 MG tablet Take 500 mg by mouth At Bedtime 6/30/2019 at Unknown time Yes Unknown, Entered By History   lisinopril (PRINIVIL/ZESTRIL) 2.5 MG tablet TAKE 1 TABLET(2.5 MG) BY MOUTH DAILY 6/30/2019 at Unknown time Yes Matt Morrissey MD   MAGNESIUM-OXIDE  400 (241.3 Mg) MG tablet TAKE 1 TABLET BY MOUTH TWICE DAILY 6/30/2019 at Unknown time Yes Matt Morrissey MD   metFORMIN (GLUCOPHAGE) 1000 MG tablet TAKE 1 TABLET BY MOUTH TWICE DAILY WITH MEALS 6/30/2019 at Unknown time Yes Matt Morrissey MD   metoprolol succinate ER (TOPROL-XL) 25 MG 24 hr tablet Take 12.5 mg by mouth At Bedtime 6/30/2019 at Unknown time Yes Unknown, Entered By History   nitroGLYcerin (NITROSTAT) 0.4 MG sublingual tablet For chest pain place 1 tablet under the tongue every 5 minutes for 3 doses. If symptoms persist 5 minutes after 1st dose call 911. as needed Yes Brandyn Graves PA   omeprazole (PRILOSEC) 40 MG DR capsule TAKE 1 CAPSULE(40 MG) BY MOUTH DAILY 30 TO 60 MINUTES BEFORE A MEAL 6/30/2019 at Unknown time Yes Matt Morrissey MD   polyethylene glycol (MIRALAX/GLYCOLAX) Packet Take 17 g by mouth daily as needed for constipation as needed Yes Reported, Patient   tamsulosin (FLOMAX) 0.4 MG capsule Take 0.4 mg by mouth At Bedtime 6/30/2019 at Unknown time Yes Unknown, Entered By History   AMITRIPTYLINE HCL PO Take 25 mg by mouth nightly as needed for sleep   Unknown, Entered By History   blood glucose monitoring (NO BRAND SPECIFIED) meter device kit Use to test blood sugar bid times daily or as directed.   Matt Morrissey MD   blood glucose monitoring (TRUE METRIX BLOOD GLUCOSE TEST) test strip USE TO TEST BLOOD SUGAR THREE TIMES DAILY OR AS DIRECTED   Matt Morrissey MD   insulin pen needle (BD JOE U/F) 32G X 4 MM miscellaneous USE FOUR TIMES DAILY OR AS DIRECTED   Matt Morrissey MD   order for DME Equipment being ordered: True Matrix Blood Glucose meter.   Matt Morrissey MD

## 2019-07-01 NOTE — DISCHARGE SUMMARY
[facility]  Hospitalist Discharge Summary       Date of Admission:  6/30/2019  Date of Discharge:  7/1/2019  Discharging Provider: Nils Ricks MD      Discharge Diagnoses   Expressive aphasia and slurred speech resolved    Follow-ups Needed After Discharge   Follow-up Appointments     Follow-up and recommended labs and tests       Follow up with primary care provider, Matt Morrissey, within 7 days for   hospital follow- up.  No follow up labs or test are needed.  Follow up with neurologist in 2-4 weeks.             Unresulted Labs Ordered in the Past 30 Days of this Admission     Date and Time Order Name Status Description    7/1/2019 0607 Lipid panel reflex to direct LDL In process           Discharge Disposition   Discharged to home  Condition at discharge: Stable    Hospital Course   Dustin Pearce is a 89 year old male with multiple chronic medical problems which include an old stroke, recent syncope suspected to be due to a seizure, chronic left carotid artery occlusion, intermittent atrial fibrillation not on any coagulation, coronary artery disease and heart failure as well as diabetes.  Last evening the patient fell asleep in a chair watching TV and when he awoke his wife noticed that he was having difficulty speaking and some slurred speech.  He did not have any focal weakness.  When EMS arrived he was not hypoglycemic and his systolic blood pressure was in the 40s.  He came to the [facility] ER as a code stroke.  CT scanning was negative for acute stroke.  The patient's difficulty speaking has resolved and is now back to his baseline mental status.      Expressive aphasia resolved  History of stroke   Chronic left carotid artery occlusion  Recurrent syncope suspected to be secondary to seizure  The patient is currently back to his baseline status. CT in the ED was without acute abnormality. Had MRI completed which was negative for acute stroke. Discussed with Dr. Fortune  from neurology service, who recommend increasing Keppra to 500mg BID (PTA 250qam and 500qhs)  - continue with PTA antiplatelets  - f/u with his neurologist after discharge, sees Dr. Padilla at [facility]    Intermittent atrial fibrillation    Currently in sinus rhythm.     Coronary artery disease   Hypertension   Chronic systolic congestive heart failure  Stable, continue aspirin, atorvastatin, clopidogrel, lisinopril, metoprolol     Hyperlipidemia   Continue statin     Type 2 diabetes mellitus   Continue insulin glargine, resume Metformin in 2 days     Benign prostatic hypertrophy   Continue tamsulosin    Consultations This Hospital Stay   NEUROLOGY IP CONSULT    Code Status   Prior    Time Spent on this Encounter   I, Nils Ricks, personally saw the patient today and spent less than or equal to 30 minutes discharging this patient.       Nils Ricks MD  [facility]  ______________________________________________________________________    Physical Exam   Vital Signs: Temp: 96.3  F (35.7  C) Temp src: Oral BP: 114/52 Pulse: 70 Heart Rate: 63 Resp: 16 SpO2: 95 % O2 Device: None (Room air)    Weight: 156 lbs 4.8 oz  General Appearance: Alert,awake and no apparent distress  Respiratory: clear to auscultation bilaterally, no wheezing  Cardiovascular: regular rate and rhythm  GI: soft and non-tender  Skin: warm and dry         Primary Care Physician   Matt Morrissey    Discharge Orders      Activity    Your activity upon discharge: activity as tolerated     Follow-up and recommended labs and tests     Follow up with primary care provider, Matt Morrissey, within 7 days for hospital follow- up.  No follow up labs or test are needed.  Follow up with neurologist in 2-4 weeks.     Diet    Follow this diet upon discharge: Orders Placed This Encounter  Regular diet       Significant Results and Procedures   Most Recent 3 CBC's:  Recent Labs   Lab Test 06/30/19  2345 04/08/19  1019 06/13/18 06/04/18    WBC 7.1 10.1  --  7.5   HGB 12.4* 12.7* 10.6* 9.8*   MCV 90 92  --  93    179  --  180     Most Recent 3 BMP's:  Recent Labs   Lab Test 06/30/19  2345 04/08/19  1019 02/28/19  1913    138 141   POTASSIUM 5.1 4.9 5.1   CHLORIDE 106 104 107   CO2 29 28 26   BUN 26 21 22   CR 0.85 0.86 0.87   ANIONGAP 5 6 8   ALLISON 9.1 9.0 9.7   * 195* 147*   ,   Results for orders placed or performed during the hospital encounter of 06/30/19   CT Head w/o Contrast    Narrative    CT OF THE HEAD WITHOUT CONTRAST July 1, 2019 3:03 AM     HISTORY: Aphasia.    TECHNIQUE: 5 mm thick axial CT images of the head were acquired  without IV contrast material. Radiation dose for this scan was reduced  using automated exposure control, adjustment of the mA and/or kV  according to patient size, or iterative reconstruction technique.    COMPARISON: Brain MR 4/8/2019.    FINDINGS: There is moderate diffuse cerebral volume loss. There are  subtle patchy areas of decreased density in the cerebral white matter  bilaterally that are consistent with sequela of chronic small vessel  ischemic disease. A moderate size chronic ischemic infarct of the  anterolateral aspect of the left frontal lobe is again noted without  change.    The ventricles and basal cisterns are within normal limits in  configuration given the degree of cerebral volume loss. There is no  midline shift. There are no extra-axial fluid collections.    No intracranial hemorrhage, mass or recent infarct.    The visualized paranasal sinuses are well-aerated. There is no  mastoiditis. There are no fractures of the visualized bones.      Impression    IMPRESSION: Moderate-sized chronic ischemic infarct of the left  frontal lobe again noted without change. Diffuse cerebral volume loss  and cerebral white matter changes consistent with chronic small vessel  ischemic disease. No evidence for acute intracranial pathology.  No  change from the comparison study.        LISANDRO  MD SHERITA   CTA Head Neck with Contrast    Narrative    CT ANGIOGRAM OF THE HEAD AND NECK WITHOUT AND WITH CONTRAST July 1, 2019 3:03 AM     HISTORY: Aphasia.    TECHNIQUE: Precontrast localizing scans were followed by CT  angiography with an injection of 70mL Isovue-370 nonionic intravenous  contrast material with scans through the head and neck. Images were  transferred to a separate 3-D workstation where multiplanar  reformations and 3-D images were created. Estimates of carotid  stenoses are made relative to the distal internal carotid artery  diameters except as noted.    Radiation dose for this scan was reduced using automated exposure  control, adjustment of the mA and/or kV according to patient size, or  iterative reconstruction technique.    COMPARISON: None.    FINDINGS:   Neck CTA: There is mild atherosclerotic narrowing at the origin of the  left common carotid artery due to calcified atherosclerotic plaque.  There is mild-moderate atherosclerotic narrowing of the mid and distal  aspects of the left common carotid artery due to calcified and  noncalcified atherosclerotic plaque. The right common carotid artery  is patent and unremarkable. There is complete occlusion of the left  internal carotid artery beginning at the origin and extending up to  the supraclinoid segment. The right internal carotid artery is patent  without stenosis by NASCET criteria. The dominant right vertebral  artery is patent and unremarkable. The slightly smaller left vertebral  artery is also patent and unremarkable.    Head CTA: There is calcified atherosclerotic plaque of the  intracranial distal internal carotid artery on the right that does not  result in any significant vascular narrowing. The left internal  carotid artery is occluded from the origin up to the supraclinoid  segment at which point flow is likely reconstituted by flow across the  anterior communicating artery or via the left posterior communicating  artery.  There is a short segment focus of moderate narrowing of the  mid and distal aspects of the P2 segment of the left posterior  cerebral artery that may be atherosclerotic in nature. The remainder  of the left posterior cerebral artery is patent and unremarkable. The  basilar, bilateral anterior cerebral, bilateral middle cerebral and  right posterior cerebral arteries are patent and unremarkable. The  anterior communicating and bilateral posterior communicating arteries  are patent and unremarkable.      Impression    IMPRESSION:  1. Complete occlusion of the left internal carotid artery from the  origin to the supraclinoid segment at which point flow is likely  reconstituted by either flow from the anterior communicating or left  posterior communicating arteries.  2. Plaque throughout the left common carotid artery resulting in  mild-moderate narrowing of the mid and distal aspects.  3. Short segment moderate narrowing of mid and distal aspects of the  P2 segment of the left posterior cerebral artery that is likely  atherosclerotic in nature.  4. Otherwise, normal neck and head CTA.      LISANDRO MAGALLON MD   CT Head Perfusion w Contrast    Narrative    CT BRAIN PERFUSION July 1, 2019 3:02 AM    HISTORY: Aphasia.    TECHNIQUE: Time sequential axial CT images of the head were acquired  during the administration of intravenous contrast (50mL Isovue-370).  CTA images of the [place] as well as color perfusion maps of  the brain were created from this time sequential axial source data.  Radiation dose for this scan was reduced using automated exposure  control, adjustment of the mA and/or kV according to patient size, or  iterative reconstruction technique.    COMPARISON: None.      Impression    IMPRESSION: There is altered perfusion (decreased cerebral blood  volume and decreased cerebral blood flow) at the site of the chronic  infarct of the anterolateral aspect of the left frontal lobe. No other  perfusion  defects.      LISANDRO MAGALLON MD   MR Brain w/o & w Contrast    Narrative    MRI BRAIN WITHOUT AND WITH CONTRAST  7/1/2019 3:43 PM     HISTORY:  Confusion, aphasia in the setting of left ICA chronic  occlusion and atrial fibrillation.  Stroke versus seizure.     TECHNIQUE:  Multiplanar, multisequence MRI of the brain without and  with 7.1 mL Gadavist.     COMPARISON: Head CT 6/30/2019. Brain MR 4/8/2019.     FINDINGS: No restricted diffusion to suggest acute infarct. Large  well-defined area of encephalomalacia in the left frontal lobe,  probably due to chronic infarct. No mass effect or midline shift. No  evidence of acute intracranial hemorrhage. Marked diffuse parenchymal  volume loss. Patchy periventricular white matter T2 hyperintensities  are nonspecific, but likely related to chronic microvascular ischemic  disease. Ventricular size is unchanged without evidence of  hydrocephalus. Chronic infarcts in the right cerebellum. Chronic basal  ganglia infarcts.    There is no abnormal intracranial enhancement.     The facial structures appear normal.       Impression    IMPRESSION:    1. No evidence of acute infarct, mass, hemorrhage, or herniation.  2. Chronic infarct in the left frontal lobe. Chronic infarcts in the  basal ganglia bilaterally and right cerebellum.  3. Diffuse parenchymal volume loss and white matter changes likely due  to chronic microvascular ischemic disease.     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)         CONTINUE these  medications which have NOT CHANGED    Details   aspirin EC 81 MG EC tablet Take 1 tablet (81 mg) by mouth daily  Qty: 30 tablet, Refills: 0    Associated Diagnoses: Transient cerebral ischemia, unspecified type      ATORVASTATIN CALCIUM PO Take 40 mg by mouth At Bedtime       bisacodyl (DULCOLAX) 10 MG Suppository Place 10 mg rectally daily as needed for constipation      clopidogrel (PLAVIX) 75 MG tablet TAKE 1 TABLET BY MOUTH DAILY  Qty: 90 tablet, Refills: 1    Associated Diagnoses: Cough      DULoxetine (CYMBALTA) 30 MG EC capsule TAKE 1 CAPSULE(30 MG) BY MOUTH DAILY  Qty: 90 capsule, Refills: 3    Associated Diagnoses: Polyneuropathy associated with underlying disease (H)      insulin glargine (BASAGLAR KWIKPEN) 100 UNIT/ML pen Inject 26 Units Subcutaneous At Bedtime  Qty: 15 mL, Refills: 11    Associated Diagnoses: Type 2 diabetes mellitus with hyperglycemia, with long-term current use of insulin (H)      ipratropium (ATROVENT) 0.03 % nasal spray INHALE 1 SPRAY IN EACH NOSTRIL EVERY 12 HOURS AS NEEDED  Qty: 30 mL, Refills: 11    Associated Diagnoses: Runny nose      lisinopril (PRINIVIL/ZESTRIL) 2.5 MG tablet TAKE 1 TABLET(2.5 MG) BY MOUTH DAILY  Qty: 90 tablet, Refills: 0    Associated Diagnoses: Coronary artery disease involving native coronary artery of native heart without angina pectoris      MAGNESIUM-OXIDE 400 (241.3 Mg) MG tablet TAKE 1 TABLET BY MOUTH TWICE DAILY  Qty: 180 tablet, Refills: 0    Associated Diagnoses: Constipation, unspecified constipation type      metoprolol succinate ER (TOPROL-XL) 25 MG 24 hr tablet Take 12.5 mg by mouth At Bedtime      nitroGLYcerin (NITROSTAT) 0.4 MG sublingual tablet For chest pain place 1 tablet under the tongue every 5 minutes for 3 doses. If symptoms persist 5 minutes after 1st dose call 911.  Qty: 25 tablet, Refills: 0    Associated Diagnoses: Coronary artery disease involving native heart, angina presence unspecified, unspecified vessel or lesion type     "  omeprazole (PRILOSEC) 40 MG DR capsule TAKE 1 CAPSULE(40 MG) BY MOUTH DAILY 30 TO 60 MINUTES BEFORE A MEAL  Qty: 90 capsule, Refills: 3    Associated Diagnoses: Other acute gastritis without hemorrhage      polyethylene glycol (MIRALAX/GLYCOLAX) Packet Take 17 g by mouth daily as needed for constipation      tamsulosin (FLOMAX) 0.4 MG capsule Take 0.4 mg by mouth At Bedtime      AMITRIPTYLINE HCL PO Take 25 mg by mouth nightly as needed for sleep      blood glucose monitoring (NO BRAND SPECIFIED) meter device kit Use to test blood sugar bid times daily or as directed.  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  Qty: 1 Act, Refills: 11    Associated Diagnoses: Type 2 diabetes mellitus with complication, with long-term current use of insulin (H)           Allergies   Allergies   Allergen Reactions     Oxycodone Other (See Comments)     \"TERRIBLE SWEATING\"     Sulfa Drugs      Tetracycline      "

## 2019-07-01 NOTE — ED TRIAGE NOTES
Pt wife fell asleep at 2245 woke up 15min later and reported pt was talking to her. Pt alert. Pt was able to ambulate to the stretcher without difficulty

## 2019-07-01 NOTE — PROGRESS NOTES
RECEIVING UNIT ED HANDOFF REVIEW    ED Nurse Handoff Report was reviewed by: Amanda Sarmiento on July 1, 2019 at 3:51 AM

## 2019-07-01 NOTE — PROGRESS NOTES
Observation goals PRIOR TO DISCHARGE     Comments: -diagnostic tests and consults completed and resulted - not met    -vital signs normal or at patient baseline - met   -returns to baseline functional status - met    Nurse to notify provider when observation goals have been met and patient is ready for discharge.

## 2019-07-01 NOTE — ED PROVIDER NOTES
History     Chief Complaint:  Altered Mental Status    The history is provided by the patient.      Dustin Pearce is a 89 year old male, anticoagulated with Plavix for CVA, with a significant history of a-fib, MI, CHF, PAD, NSTEMI, and TIA, who presents with altered mental status via EMS. At 2345, patient was in bed talking to his wife at bedside when she fell asleep. Wife states patient is typically chatty and responsive. He was talking normally before she fell asleep. When she woke up, she reports that patient's speech was more delayed and slurred. Wife was concerned and called EMS who transported the patient to the ED. En route, patient was still reportedly aphasic at times and speaking normally at other times. Per EMS, patient had a blood glucose of 160 and a blood pressure systolic in the 140s. Here, patient states he has pain all over and gestures to his abdomen. He does not know if he has had trouble speaking with his past strokes. He denies dysuria. Patient did not have dinner tonight and states he is not upset when prompted.      Cardiac Risk Factors   Sex: Male    Tobacco: Positive   Hypertension: Negative  Diabetes: Positive  Hyperlipidemia: Positive   Family History: Positive     Allergies:  Oxycodone  Sulfa drugs   Tetracycline     Medications:    Amitriptyline  Aspirin 81 mg tablet  Dulcolax  Plavix  Cymbalta  Insulin glargine  Atrovent   Lisinopril  Magnesium-oxide  Metformin  Toprol   Nitrostat  Prilosec  Miralax  Flomax    Past Medical History:    NSTEMI  Anemia   Cognitive impairment  CHF  Prostatic hyperplasia  Severe sepsis  UTI  DM type 2  CAD  Depression   Stenosis of right internal carotid artery with cerebral infarction  TIA  Carotid stenosis  Stroke  Peripheral artery disease  Aortic stenosis  RBBB  Balance problems  Anemia   Physical deconditioning  A-fib  Ischemic cardiomyopathy  Aortic root dilatation  HTN  Abdominal aortic aneurysm   Hyperlipidemia   GERD  CVA  MI   Nephrolithiasis    Osteopenia    Past Surgical History:    Partial left foot amputation   Cholecystectomy  Right carotid endarterectomy   EGD  Genitourinary surgery  UGI endoscopy  Hernia repair  Foot IND  Ureter lithotripsy, insertion of stent combined  Rotator cuff repair    Family History:    Breast cancer  Heart failure - father    Social History:  Former smoker  Positive for alcohol use.   Negative for drug use.  Marital Status:  .     Review of Systems   Constitutional: Negative for fever.   Respiratory: Negative for shortness of breath.    Cardiovascular: Negative for chest pain.   Gastrointestinal: Positive for abdominal pain.   Genitourinary: Negative for dysuria.   Neurological: Positive for speech difficulty.   All other systems reviewed and are negative.        Physical Exam     Patient Vitals for the past 24 hrs:   BP Temp Temp src Pulse Heart Rate Resp SpO2 Weight   07/01/19 0436 137/62 96.5  F (35.8  C) Oral -- 69 16 93 % 70.9 kg (156 lb 4.8 oz)   07/01/19 0230 (!) 138/94 -- -- 70 71 16 94 % --   07/01/19 0112 (!) 165/96 -- -- 68 71 14 93 % --   07/01/19 0042 -- -- -- -- 69 12 96 % --   07/01/19 0041 168/59 -- -- 71 -- -- -- --   06/30/19 2341 175/67 98  F (36.7  C) Oral -- 66 20 99 % 74.8 kg (165 lb)     Physical Exam  General: Appears well-developed and well-nourished.   Head: No signs of trauma.   Mouth/Throat: Oropharynx is clear and moist.   Eyes: Conjunctivae are normal. Pupils are equal, round, and reactive to light.   Neck: Normal range of motion. No nuchal rigidity. No cervical adenopathy  CV: Normal rate and regular rhythm.    Resp: Effort normal and breath sounds normal. No respiratory distress.   GI: Soft. There is no tenderness.  No rebound or guarding.  Normal bowel sounds.    MSK: Normal range of motion. no edema. No Calf tenderness.  Neuro: The patient is alert and oriented.  PERRLA, EOMI, strength in upper/lower extremities normal and symmetrical.   Sensation normal. CN2-12 intact.  Amble to  stand and transfer beds.  Speech very slow and deliberate, but clear.  GCS 15  Skin: Skin is warm and dry. No rash noted.       Emergency Department Course   ECG:  Indication: Altered mental status   Time: 2344  Vent. Rate 62 bpm. WI interval 166. QRS duration 138. QT/QTc 464/470. P-R-T axis 71 -77 65. Normal sinus rhythm. Left axis deviation. Right bundle branch block. Abnormal ECG. Agrees with computer interpretation. Read time: 2349.    Imaging:  Radiographic findings were communicated with the patient who voiced understanding of the findings.    CT Head without contrast:   1. No CT evidence of acute intracranial hemorrhage, mass or recent transcortical infarct.   2. Moderate chronic infarct in the anterolateral left frontal lobe.  3. Moderate cerebral volume loss and presumed chronic small vessel ischemic changes.  As per radiology.    Head CTA:   1. No branch vessel occlusion, high-grade stenosis, aneurysm, or high flow vascular malformation involving proximal [place] vessels.   As per radiology.     Neck CTA:  1. Chronic occlusion of the proximal left internal carotid artery just beyond the bifurcation.   2. No measurable stenosis in the proximal right internal carotid artery based on NASCET criteria.   3. Moderate to severe segmental narrowing of the proximal left subclavian artery.   As per radiology.     CT Head Perfusion:   1. No acute segmental perfusion defect identified.   As per radiology.     Laboratory:  CBC: WBC: 7.1, HGB: 12.4 (L), PLT: 189.  BMP: Glucose 168 (H), o/w WNL (Creatinine: 0.85)  PTT: 31  INR: 1.01  2345 Troponin: <0.015.     UA with micro: light yellow, clear, blood: small, RBC: 30 (H), mucous present o/w negative    Emergency Department Course:  2335 Nursing notes and vitals reviewed. I performed an exam of the patient as documented above.     2346 I called a code stroke on the patient.     2349 I consulted with Stroke Neurology, Dr. Davis, regarding the patient's history  and presentation here in the emergency department.    IV inserted. Medicine administered as documented above. Blood drawn. This was sent to the lab for further testing, results above. The patient provided a urine sample here in the emergency department. This was sent for laboratory testing, findings above.     EKG obtained in the ED, see results above.     The patient was sent for a head CT, head perfusion CT, and head/neck CTA while in the emergency department, findings above.     0011 I spoke with Dr. Zarate, [facility], regarding the patient's history and presentation today.     0025 I spoke with Stroke Neurology, regarding the patient's imaging.    0055 I rechecked the patient and discussed the results of his workup thus far.     0058 I spoke with Dr. Zarate, [facility], again.     0100 The patient was deescalated.     0235  I consulted with Dr. Brooks of the hospitalist services. They are in agreement to accept the patient for admission.    Findings and plan explained to the Patient who consents to admission. Discussed the patient with Dr. Brooks, who will admit the patient to an observation bed for further monitoring, evaluation, and treatment.    Impression & Plan    Medical Decision Making:  Dustin Pearce is a 89 year old male who presents due to slurred speech. He does have a history of a stroke and this evening he was apparently acting normally.  His wife had fallen asleep on the couch and half an hour later when she woke up and noted that his speech was significantly different, prompting her to call 911.  On chart review, patient did have a similar presentation in April although at that time he also had some right-sided weakness per documentation.  In the ER, he was alert and oriented and had appropriate strength throughout.  He was able to answer questions but his speech was quite slow and deliberate and would require repeating the question a few times.  I did call a code stroke and  imaging was obtained that did not show any signs of acute process.  Over time, the patient's symptoms did resolve while he was in the emergency department.  Given this, he is not a candidate for TPA or other intervention.  Patient remained stable through his time in the ER and was ultimately admitted to the hospitalist service for continued monitoring.  He is on Plavix as an antiplatelet agent.  Critical Care time:  30 minutes.    Diagnosis:    ICD-10-CM    1. Slow rate of speech R47.89 Hemoglobin A1c     Glucose by meter     Glucose by meter     Disposition:  Admitted to the observation floor.     Scribe Disposition  Alessandra BOYD, am serving as a scribe on 7/1/2019 at 12:05 AM to personally document services performed by Wilmar Dunham MD based on my observations and the provider's statements to me.     Alessandra Salvador  6/30/2019    EMERGENCY DEPARTMENT       Wilmar Dunham MD  07/01/19 0722

## 2019-07-01 NOTE — H&P
[facility]    History and Physical - Hospitalist Service       Date of Admission:  6/30/2019    Assessment & Plan   Dustin Pearce is a 89 year old male with multiple chronic medical problems which include an old stroke, recent syncope suspected to be due to a seizure, chronic left carotid artery occlusion, intermittent atrial fibrillation not on any coagulation, coronary artery disease and heart failure as well as diabetes.  Last evening the patient fell asleep in a chair watching TV and when he awoke his wife noticed that he was having difficulty speaking and some slurred speech.  He did not have any focal weakness.  When EMS arrived he was not hypoglycemic and his systolic blood pressure was in the 40s.  He came to the [facility] ER as a code stroke.  CT scanning was negative for acute stroke.  The patient's difficulty speaking has resolved and is now back to his baseline mental status.  He is going to be admitted to the observation unit.    Expressive aphasia   History of stroke   Chronic left carotid artery occlusion  The patient is currently back to his baseline status.  We will continue his aspirin and clopidogrel.  Continue cardiac monitoring and we will ask the stroke neurologist to see him in the morning.  Continue atorvastatin.    Recurrent syncope suspected to be secondary to seizures   Continue Keppra    Intermittent atrial fibrillation    Currently in sinus rhythm.  Continue cardiac monitoring  Consider full anticoagulation    Coronary artery disease   Hypertension   Chronic systolic congestive heart failure  Stable, continue aspirin, atorvastatin, clopidogrel, lisinopril, metoprolol    Hyperlipidemia   Continue statin    Type 2 diabetes mellitus   No reported hypoglycemia recently  Hold metformin  Continue insulin glargine  Start aspart insulin per correction scale    Benign prostatic hypertrophy   Continue tamsulosin     Diet: diabetic   DVT Prophylaxis: Low Risk/Ambulatory  with no VTE prophylaxis indicated  Luis Catheter: not present  Code Status: Full     Disposition Plan   Expected discharge: Today, recommended to prior living arrangement once mental status at baseline.  Entered: Onel Brooks MD 07/01/2019, 3:12 AM     The patient's care was discussed with the Patient and Patient's Family.    Onel Brooks MD  [facility]    ______________________________________________________________________    Chief Complaint   Difficulty speaking     History is obtained from the patient, electronic health record, emergency department physician and patient's spouse    History of Present Illness   Dustin Pearce is a 89 year old male who has a history of stroke, coronary artery disease, intermittent atrial fibrillation, left carotid artery occlusion, diabetes mellitus.  He was last admitted here in April after an episode of syncope followed by expressive a aphasia and right-sided weakness.  An outpatient cardiac monitor was negative.  He is currently on Keppra for possible syncope due to seizures.  He was admitted here last May with a non-ST elevation myocardial infarction and was found to have severe three-vessel disease.  He underwent stenting of the LAD and circumflex.  Last evening he was sitting next to his wife watching TV.  They both fell asleep.  His wife woke up and noticed that her  was getting up and attempting to get ready for bed.  He appeared to be confused and was having difficulty with word finding.  His speech was a little slurred.  He was not having any weakness.  She called 911.  First responders found his glucometer to be 160 and his systolic blood pressure was in the 140s.  By the time patient was seen in the emergency room as a code stroke his symptoms had resolved.  In the emergency room his blood pressure initially was 175/67 and is come down to 138/94, temperature 98  F, heart rate 66, respiratory 20, oxygen saturation 99%.  EKG  showed sinus rhythm.  CT head and CT head perfusion were negative for acute stroke.  CTA showed his already known total left internal carotid artery occlusion.  Labs were unremarkable.  He is currently at his baseline mental status.  According to his wife, the patient has not had any fever, cough, cold, chest pain, abdominal pain, nausea, vomiting, diarrhea.  He has been sleeping and eating as usual.    Review of Systems    The 10 point Review of Systems is negative other than noted in the HPI or here.     Past Medical History    I have reviewed this patient's medical history and updated it with pertinent information if needed.   Past Medical History:   Diagnosis Date     Abdominal aortic aneurysm (H) 12/25/2016     Acute on chronic systolic congestive heart failure (H) 6/7/2018     Anemia 6/7/2018     Anemia due to blood loss, acute 6/13/2017     Aortic stenosis     mild     Benign essential hypertension 1/6/2017     Benign non-nodular prostatic hyperplasia with lower urinary tract symptoms 10/20/2016     CAD (coronary artery disease)     MI 1970     Carotid artery stenosis 10/20/2016    chronically occluded left internal carotid artery     Cerebrovascular accident (H) 10/20/2016     Cognitive impairment 6/7/2018     Coronary artery disease of native artery of native heart with stable angina pectoris (H) 10/20/2016    MI 1970     Depression, unspecified depression type 2/22/2018     Diabetes mellitus (H)      Diabetic ulcer of left foot associated with diabetes mellitus due to underlying condition (H) 6/12/2017     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     Gastro-oesophageal reflux disease      Gastroesophageal reflux disease without esophagitis 10/20/2016     Heart attack (H)      Hyperlipidemia      Hyperlipidemia, unspecified hyperlipidemia type 10/20/2016     Ischemic cardiomyopathy      Lumbar back pain 12/30/2016     Mild cognitive impairment 10/20/2016     Nephrolithiasis     RECURRENT      NSTEMI (non-ST elevated myocardial infarction) (H) 6/7/2018     Osteopenia      PAD (peripheral artery disease) (H)     peripheral angiogram 5/2017: The common iliac artery stenoses were stented and the superficial femoral artery stenoses were angioplastied     Paroxysmal atrial fibrillation (H)      Peripheral artery disease (H)     peripheral angiogram 5/2017: The common iliac artery stenoses were stented and the superficial femoral artery stenoses were angioplastied     RBBB with left anterior fascicular block      Slow transit constipation 2/22/2018     Stroke of unknown etiology (H) 12/31/2017     Syncope      TIA (transient ischemic attack) 1/20/2017     Unspecified cerebral artery occlusion with cerebral infarction      UTI (urinary tract infection) due to Enterococcus 2/22/2018     Ventricular tachycardia (H)     nonsustained       Past Surgical History   I have reviewed this patient's surgical history and updated it with pertinent information if needed.  Past Surgical History:   Procedure Laterality Date     AMPUTATE FOOT Left 6/4/2017    Procedure: AMPUTATE FOOT;  Sun Add#3: partial left foot amputation - Sagital Saw - Mini C-Arm;  Surgeon: Moe Kirk DPM;  Location:  OR     CHOLECYSTECTOMY       ENDARTERECTOMY CAROTID Right 1/3/2018    Procedure: ENDARTERECTOMY CAROTID;  RIGHT CAROTID ENDARTERECTOMY WITH EEG;  Surgeon: Roland Rodriguez MD;  Location:  OR     ESOPHAGOSCOPY, GASTROSCOPY, DUODENOSCOPY (EGD), COMBINED N/A 12/26/2016    Procedure: COMBINED ESOPHAGOSCOPY, GASTROSCOPY, DUODENOSCOPY (EGD);  Surgeon: Nasim Louis MD;  Location:  GI     GENITOURINARY SURGERY      KIDNEY STONE REMOVAL X 4     HC UGI ENDOSCOPY W EUS N/A 12/29/2016    Procedure: COMBINED ENDOSCOPIC ULTRASOUND, ESOPHAGOSCOPY, GASTROSCOPY, DUODENOSCOPY (EGD);  Surgeon: Nasim Louis MD;  Location:  GI     HERNIA REPAIR       INCISION AND DRAINAGE FOOT, COMBINED Left 6/6/2017    Procedure:  COMBINED INCISION AND DRAINAGE FOOT;  REVISIONAL LEFT FOOT INCISION AND DRAINAGE WITH POSSIBLE FLAP CLOSURE , ANTIBIOTIC SOLUTION ;  Surgeon: Nabil Ann DPM;  Location: [facility] OR     LASER HOLMIUM LITHOTRIPSY URETER(S), INSERT STENT, COMBINED  3/2/2012    Procedure:COMBINED CYSTOSCOPY, URETEROSCOPY, LASER HOLMIUM LITHOTRIPSY URETER(S), INSERT STENT; CYSTOSCOPY, RIGHT RETROGRADES, RIGHT URETEROSCOPY, HOLMIUM LASER, RIGHT STENT PLACEMENT; Surgeon:ANJELICA LEÓN; Location:[facility] OR     ROTATOR CUFF REPAIR RT/LT         Social History   I have reviewed this patient's social history and updated it with pertinent information if needed.  Social History     Tobacco Use     Smoking status: Former Smoker     Packs/day: 1.00     Years: 30.00     Pack years: 30.00     Types: Cigarettes     Last attempt to quit: 1999     Years since quittin.5     Smokeless tobacco: Never Used   Substance Use Topics     Alcohol use: Not Currently     Alcohol/week: 4.2 oz     Types: 7 Standard drinks or equivalent per week     Comment: 1 drink per biweekly     Drug use: No       Family History   I have reviewed this patient's family history and updated it with pertinent information if needed.   Family History   Problem Relation Age of Onset     Breast Cancer Mother      Heart Failure Father 81       Prior to Admission Medications   Prior to Admission Medications   Prescriptions Last Dose Informant Patient Reported? Taking?   AMITRIPTYLINE HCL PO  Spouse/Significant Other Yes No   Sig: Take 25 mg by mouth nightly as needed for sleep   ATORVASTATIN CALCIUM PO  Spouse/Significant Other Yes No   Sig: Take 40 mg by mouth At Bedtime    DULoxetine (CYMBALTA) 30 MG EC capsule  Spouse/Significant Other No No   Sig: TAKE 1 CAPSULE(30 MG) BY MOUTH DAILY   MAGNESIUM-OXIDE 400 (241.3 Mg) MG tablet   No No   Sig: TAKE 1 TABLET BY MOUTH TWICE DAILY   aspirin EC 81 MG EC tablet  Spouse/Significant Other No No   Sig: Take 1 tablet (81 mg) by  mouth daily   bisacodyl (DULCOLAX) 10 MG Suppository  Spouse/Significant Other Yes No   Sig: Place 10 mg rectally daily as needed for constipation   blood glucose monitoring (NO BRAND SPECIFIED) meter device kit  Spouse/Significant Other No No   Sig: Use to test blood sugar bid times daily or as directed.   blood glucose monitoring (TRUE METRIX BLOOD GLUCOSE TEST) test strip  Spouse/Significant Other No No   Sig: USE TO TEST BLOOD SUGAR THREE TIMES DAILY OR AS DIRECTED   clopidogrel (PLAVIX) 75 MG tablet   No No   Sig: TAKE 1 TABLET BY MOUTH DAILY   insulin glargine (BASAGLAR KWIKPEN) 100 UNIT/ML pen   No No   Sig: Inject 26 Units Subcutaneous At Bedtime   insulin pen needle (BD JOE U/F) 32G X 4 MM miscellaneous   No No   Sig: USE FOUR TIMES DAILY OR AS DIRECTED   ipratropium (ATROVENT) 0.03 % nasal spray   No No   Sig: INHALE 1 SPRAY IN EACH NOSTRIL EVERY 12 HOURS AS NEEDED   levETIRAcetam (KEPPRA) 250 MG tablet   Yes Yes   Sig: Take 250 mg by mouth 2 times daily   lisinopril (PRINIVIL/ZESTRIL) 2.5 MG tablet   No No   Sig: TAKE 1 TABLET(2.5 MG) BY MOUTH DAILY   lisinopril (PRINIVIL/ZESTRIL) 5 MG tablet  Spouse/Significant Other No No   Sig: Take 1 tablet (5 mg) by mouth daily   metFORMIN (GLUCOPHAGE) 1000 MG tablet  Spouse/Significant Other No No   Sig: TAKE 1 TABLET BY MOUTH TWICE DAILY WITH MEALS   metoprolol succinate ER (TOPROL-XL) 25 MG 24 hr tablet  Spouse/Significant Other Yes No   Sig: Take 12.5 mg by mouth At Bedtime   nitroGLYcerin (NITROSTAT) 0.4 MG sublingual tablet  Spouse/Significant Other No No   Sig: For chest pain place 1 tablet under the tongue every 5 minutes for 3 doses. If symptoms persist 5 minutes after 1st dose call 911.   omeprazole (PRILOSEC) 40 MG DR capsule  Spouse/Significant Other No No   Sig: TAKE 1 CAPSULE(40 MG) BY MOUTH DAILY 30 TO 60 MINUTES BEFORE A MEAL   order for DME  Spouse/Significant Other No No   Sig: Equipment being ordered: True Matrix Blood Glucose meter.  "  polyethylene glycol (MIRALAX/GLYCOLAX) Packet  Spouse/Significant Other Yes No   Sig: Take 17 g by mouth daily as needed for constipation   tamsulosin (FLOMAX) 0.4 MG capsule  Spouse/Significant Other Yes No   Sig: Take 0.4 mg by mouth At Bedtime      Facility-Administered Medications: None     Allergies   Allergies   Allergen Reactions     Oxycodone Other (See Comments)     \"TERRIBLE SWEATING\"     Sulfa Drugs      Tetracycline        Physical Exam   Vital Signs: Temp: 98  F (36.7  C) Temp src: Oral BP: (!) 138/94 Pulse: 70 Heart Rate: 71 Resp: 16 SpO2: 94 % O2 Device: None (Room air)    Weight: 165 lbs 0 oz    Constitutional: awake, alert, cooperative, no apparent distress, and appears stated age  Eyes: Lids and lashes normal, pupils equal, round and reactive to light, extra ocular muscles intact, sclera clear, conjunctiva normal  ENT: Normocephalic, without obvious abnormality, atraumatic,sinuses nontender on palpation, external ears without lesions, oral pharynx with moist mucous membranes, tonsils without erythema or exudate  Respiratory: No increased work of breathing, good air exchange, clear to auscultation bilaterally, no crackles or wheezing  Cardiovascular:  regular rate and rhythm, normal S1 and S2, no S3 or S4, and no murmur noted  GI:  normal bowel sounds, soft, non-distended, non-tender, no masses palpated  Skin: no rashes and no jaundice  Musculoskeletal: There is no redness, warmth, or swelling of the joints.  Full range of motion noted  Neurologic: Awake, alert, oriented to name, place and time.  Cranial nerves II-XII are grossly intact.  Motor is 5 out of 5 bilaterally  Neuropsychiatric: General: normal, calm and normal eye contact    Data   Data reviewed today: I reviewed all medications, new labs and imaging results over the last 24 hours. I personally reviewed the EKG tracing showing NSR.    Recent Labs   Lab 06/30/19  2345   WBC 7.1   HGB 12.4*   MCV 90      INR 1.01    "   POTASSIUM 5.1   CHLORIDE 106   CO2 29   BUN 26   CR 0.85   ANIONGAP 5   ALLISON 9.1   *   TROPI <0.015     No results found for this or any previous visit (from the past 24 hour(s)).

## 2019-07-01 NOTE — PLAN OF CARE
PRIMARY DIAGNOSIS: SYNCOPE/TIA  OUTPATIENT/OBSERVATION GOALS TO BE MET BEFORE DISCHARGE:  1. Orthostatic performed: n/a    2. Diagnostic testing complete & at baseline neurologic testing: unknown    3. Cleared by consultants (if involved): no, neuro to see    4. Interpretation of cardiac rhythm per telemetry tech:     5. Tolerating adequate PO diet and medications: unknown    6. Return to near baseline physical activity or neurologic status: yes    Discharge Planner Nurse   Safe discharge environment identified: yes  Barriers to discharge: {no       Entered by: Amanda Sarmiento 07/01/2019 5:29 AM     Please review provider order for any additional goals.   Nurse to notify provider when observation goals have been met and patient is ready for discharge.      Admitted to OBS at 0500. No overt neuro deficiencies. His speech is calm, logical, spontaneous. Wife confirms his speech is at baseline. He is alert and oriented. VSS on room air.    He has macular degeneration; peripheral vision impaired. Denies pain, nausea, dyspnea. Bed alarm for safety.

## 2019-07-01 NOTE — CONSULTS
[facility]      Stroke Consult Note    Reason for Consult:  Non-emergent consult request for aphasia, resolved    Chief Complaint: Altered Mental Status      History of Present Illness     Dustin Pearce is a 89 year old male with PVD s/p amputation of R foot, HTN, DMII, CAD, recurrent GI bleeding, paroxysmal Afib not on anticoagulation, TIA in 2017 s/p R. 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  His stat CT/CTA/CTP were remarkable for diffuse atherosclerosis and chronic L. Carotid artery occlusion with left frontal encephalomalacia and gliosis from prior stroke.  His MRI did not show an acute ischemic stroke, he is now back to his baseline.  Of note, his seizure medications were reduced to Keppra 250mg qAM and 500mg qPM due to causing fatigue.  That was months ago and he seems to be tolerated the medication better now.      Assessment & Plan     Impression  1. Probable seizure  2. 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 Endovascular Treatment was Not initiated due to absence of proximal vessel occlusion    Recommendations  1. Increase Keppra to 500mg BID  2. Follow-up with his local neurologist for ongoing management of post-stroke seizures  3. Could utilize Plavix alone for secondary stroke prevention, defer to primary/cardiology for indication for ongoing dual antiplatlet  4. BP goal < 140/90, with tighter control associated with lower overall CV risk, if tolerated  5. Continue Atorvastatin 40mg  6. Goal A1c < 7.0  7. Follow-up with current Neurologist for titration of seizure medication    Current Facility-Administered Medications:      acetaminophen (TYLENOL) Suppository 650 mg, 650 mg, Rectal, Q4H PRN, Onel Brooks MD     acetaminophen (TYLENOL) tablet 650 mg, 650 mg, Oral, Q4H  PRN, Onel Brooks MD     amitriptyline (ELAVIL) tablet 25 mg, 25 mg, Oral, At Bedtime PRN, Onel Brooks MD     aspirin EC tablet 81 mg, 81 mg, Oral, Daily, Onel Brooks MD, 81 mg at 07/01/19 0755     atorvastatin (LIPITOR) tablet 40 mg, 40 mg, Oral, At Bedtime, Onel Brooks MD, 40 mg at 07/01/19 0445     clopidogrel (PLAVIX) tablet 75 mg, 75 mg, Oral, Daily, Onel Brooks MD, 75 mg at 07/01/19 0755     glucose gel 15-30 g, 15-30 g, Oral, Q15 Min PRN **OR** dextrose 50 % injection 25-50 mL, 25-50 mL, Intravenous, Q15 Min PRN **OR** glucagon injection 1 mg, 1 mg, Subcutaneous, Q15 Min PRN, Onel Brooks MD     DULoxetine (CYMBALTA) DR capsule 30 mg, 30 mg, Oral, Daily, Onel Brooks MD, 30 mg at 07/01/19 0754     insulin aspart (NovoLOG) inj (RAPID ACTING), 1-7 Units, Subcutaneous, TID AC, Onel Brooks MD     insulin aspart (NovoLOG) inj (RAPID ACTING), 1-5 Units, Subcutaneous, At Bedtime, Onel Brooks MD     insulin glargine (LANTUS PEN) injection 26 Units, 26 Units, Subcutaneous, At Bedtime, Onel Brooks MD, 26 Units at 07/01/19 0510     [START ON 7/2/2019] levETIRAcetam (KEPPRA) tablet 250 mg, 250 mg, Oral, Daily, Nils Ricks MD     levETIRAcetam (KEPPRA) tablet 500 mg, 500 mg, Oral, At Bedtime, Nils Ricks MD     lidocaine (LMX4) cream, , Topical, Q1H PRN, Onel Brooks MD     lidocaine 1 % 0.1-1 mL, 0.1-1 mL, Other, Q1H PRN, Onel Brooks MD     [START ON 7/2/2019] lisinopril (PRINIVIL/Zestril) tablet 2.5 mg, 2.5 mg, Oral, Daily, Nils Ricks MD     melatonin tablet 1 mg, 1 mg, Oral, At Bedtime PRN, Onel Brooks MD     metoprolol succinate (TOPROL-XL) half-tab 12.5 mg, 12.5 mg, Oral, At Bedtime, Onel Brooks MD, 12.5 mg at 07/01/19 0445     naloxone (NARCAN) injection 0.1-0.4 mg, 0.1-0.4 mg, Intravenous, Q2 Min PRN, Onel Brooks  MD YAJAIRA     ondansetron (ZOFRAN-ODT) ODT tab 4 mg, 4 mg, Oral, Q6H PRN **OR** ondansetron (ZOFRAN) injection 4 mg, 4 mg, Intravenous, Q6H PRN, Onel Brooks MD     sodium chloride (PF) 0.9% PF flush 3 mL, 3 mL, Intracatheter, q1 min prn, Onel Brooks MD     sodium chloride (PF) 0.9% PF flush 3 mL, 3 mL, Intracatheter, Q8H, Onel Brooks MD, 3 mL at 07/01/19 1203     tamsulosin (FLOMAX) capsule 0.4 mg, 0.4 mg, Oral, At Bedtime, Onel Brooks MD, 0.4 mg at 07/01/19 5962      Patient Follow-up    - in 4-6 weeks with local neurologist    Please contact the Stroke Service with any questions.  No further work-up, we will sign-off.    Onel Fortune MD, MS  Vascular Neurology    Text Page ([phone])  __________________________________________________    Past Medical History   Past Medical History:   Diagnosis Date     Abdominal aortic aneurysm (H) 12/25/2016     Acute on chronic systolic congestive heart failure (H) 6/7/2018     Anemia 6/7/2018     Anemia due to blood loss, acute 6/13/2017     Aortic stenosis     mild     Benign essential hypertension 1/6/2017     Benign non-nodular prostatic hyperplasia with lower urinary tract symptoms 10/20/2016     CAD (coronary artery disease)     MI 1970     Carotid artery stenosis 10/20/2016    chronically occluded left internal carotid artery     Cerebrovascular accident (H) 10/20/2016     Cognitive impairment 6/7/2018     Coronary artery disease of native artery of native heart with stable angina pectoris (H) 10/20/2016    MI 1970     Depression, unspecified depression type 2/22/2018     Diabetes mellitus (H)      Diabetic ulcer of left foot associated with diabetes mellitus due to underlying condition (H) 6/12/2017     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     Gastro-oesophageal reflux disease      Gastroesophageal reflux disease without esophagitis 10/20/2016     Heart attack (H)      Hyperlipidemia       Hyperlipidemia, unspecified hyperlipidemia type 10/20/2016     Ischemic cardiomyopathy      Lumbar back pain 12/30/2016     Mild cognitive impairment 10/20/2016     Nephrolithiasis     RECURRENT     NSTEMI (non-ST elevated myocardial infarction) (H) 6/7/2018     Osteopenia      PAD (peripheral artery disease) (H)     peripheral angiogram 5/2017: The common iliac artery stenoses were stented and the superficial femoral artery stenoses were angioplastied     Paroxysmal atrial fibrillation (H)      Peripheral artery disease (H)     peripheral angiogram 5/2017: The common iliac artery stenoses were stented and the superficial femoral artery stenoses were angioplastied     RBBB with left anterior fascicular block      Slow transit constipation 2/22/2018     Stroke of unknown etiology (H) 12/31/2017     Syncope      TIA (transient ischemic attack) 1/20/2017     Unspecified cerebral artery occlusion with cerebral infarction      UTI (urinary tract infection) due to Enterococcus 2/22/2018     Ventricular tachycardia (H)     nonsustained       Past Surgical History   Past Surgical History:   Procedure Laterality Date     AMPUTATE FOOT Left 6/4/2017    Procedure: AMPUTATE FOOT;  Sun Add#3: partial left foot amputation - Sagital Saw - Mini C-Arm;  Surgeon: Moe Kirk DPM;  Location:  OR     CHOLECYSTECTOMY       ENDARTERECTOMY CAROTID Right 1/3/2018    Procedure: ENDARTERECTOMY CAROTID;  RIGHT CAROTID ENDARTERECTOMY WITH EEG;  Surgeon: Roland Rodriguez MD;  Location:  OR     ESOPHAGOSCOPY, GASTROSCOPY, DUODENOSCOPY (EGD), COMBINED N/A 12/26/2016    Procedure: COMBINED ESOPHAGOSCOPY, GASTROSCOPY, DUODENOSCOPY (EGD);  Surgeon: Nasim Louis MD;  Location:  GI     GENITOURINARY SURGERY      KIDNEY STONE REMOVAL X 4     HC UGI ENDOSCOPY W EUS N/A 12/29/2016    Procedure: COMBINED ENDOSCOPIC ULTRASOUND, ESOPHAGOSCOPY, GASTROSCOPY, DUODENOSCOPY (EGD);  Surgeon: Nasim Louis MD;  Location:   GI     HERNIA REPAIR       INCISION AND DRAINAGE FOOT, COMBINED Left 6/6/2017    Procedure: COMBINED INCISION AND DRAINAGE FOOT;  REVISIONAL LEFT FOOT INCISION AND DRAINAGE WITH POSSIBLE FLAP CLOSURE , ANTIBIOTIC SOLUTION ;  Surgeon: Nabil Ann DPM;  Location:  OR     LASER HOLMIUM LITHOTRIPSY URETER(S), INSERT STENT, COMBINED  3/2/2012    Procedure:COMBINED CYSTOSCOPY, URETEROSCOPY, LASER HOLMIUM LITHOTRIPSY URETER(S), INSERT STENT; CYSTOSCOPY, RIGHT RETROGRADES, RIGHT URETEROSCOPY, HOLMIUM LASER, RIGHT STENT PLACEMENT; Surgeon:ANJELICA LEÓN; Location: OR     ROTATOR CUFF REPAIR RT/LT         Medications     Home Meds  Prior to Admission medications    Medication Sig Start Date End Date Taking? Authorizing Provider   aspirin EC 81 MG EC tablet Take 1 tablet (81 mg) by mouth daily 1/21/17  Yes Tra Reynoso MD   ATORVASTATIN CALCIUM PO Take 40 mg by mouth At Bedtime    Yes Reported, Patient   bisacodyl (DULCOLAX) 10 MG Suppository Place 10 mg rectally daily as needed for constipation   Yes Reported, Patient   clopidogrel (PLAVIX) 75 MG tablet TAKE 1 TABLET BY MOUTH DAILY 5/29/19  Yes Matt Morrissey MD   DULoxetine (CYMBALTA) 30 MG EC capsule TAKE 1 CAPSULE(30 MG) BY MOUTH DAILY 10/1/18  Yes Matt Morrissey MD   insulin glargine (BASAGLAR KWIKPEN) 100 UNIT/ML pen Inject 26 Units Subcutaneous At Bedtime 5/30/19  Yes Matt Morrissey MD   ipratropium (ATROVENT) 0.03 % nasal spray INHALE 1 SPRAY IN EACH NOSTRIL EVERY 12 HOURS AS NEEDED 5/6/19  Yes Matt Morrissey MD   levETIRAcetam (KEPPRA) 250 MG tablet Take 250 mg by mouth every morning   Yes Unknown, Entered By History   levETIRAcetam (KEPPRA) 250 MG tablet Take 500 mg by mouth At Bedtime   Yes Unknown, Entered By History   lisinopril (PRINIVIL/ZESTRIL) 2.5 MG tablet TAKE 1 TABLET(2.5 MG) BY MOUTH DAILY 5/8/19  Yes Matt Morrissey MD   MAGNESIUM-OXIDE 400 (241.3 Mg) MG tablet TAKE 1 TABLET BY MOUTH TWICE DAILY 5/8/19  Yes  Matt Morrissey MD   metFORMIN (GLUCOPHAGE) 1000 MG tablet Take 1 tablet (1,000 mg) by mouth 2 times daily (with meals) 7/3/19  Yes Nils Ricks MD   metoprolol succinate ER (TOPROL-XL) 25 MG 24 hr tablet Take 12.5 mg by mouth At Bedtime   Yes Unknown, Entered By History   nitroGLYcerin (NITROSTAT) 0.4 MG sublingual tablet For chest pain place 1 tablet under the tongue every 5 minutes for 3 doses. If symptoms persist 5 minutes after 1st dose call 911. 5/31/18  Yes Brandyn Graves PA   omeprazole (PRILOSEC) 40 MG DR capsule TAKE 1 CAPSULE(40 MG) BY MOUTH DAILY 30 TO 60 MINUTES BEFORE A MEAL 1/11/19  Yes Matt Morrissey MD   polyethylene glycol (MIRALAX/GLYCOLAX) Packet Take 17 g by mouth daily as needed for constipation 1/11/18  Yes Reported, Patient   tamsulosin (FLOMAX) 0.4 MG capsule Take 0.4 mg by mouth At Bedtime   Yes Unknown, Entered By History   AMITRIPTYLINE HCL PO Take 25 mg by mouth nightly as needed for sleep    Unknown, Entered By History   blood glucose monitoring (NO BRAND SPECIFIED) meter device kit Use to test blood sugar bid times daily or as directed. 11/29/18   Matt Morrissey MD   blood glucose monitoring (TRUE METRIX BLOOD GLUCOSE TEST) test strip USE TO TEST BLOOD SUGAR THREE TIMES DAILY OR AS DIRECTED 6/6/18   Matt Morrissey MD   insulin pen needle (BD JOE U/F) 32G X 4 MM miscellaneous USE FOUR TIMES DAILY OR AS DIRECTED 5/15/19   Matt Morrissey MD   order for DME Equipment being ordered: True Matrix Blood Glucose meter. 6/8/18   Matt Morrissey MD       Scheduled meds    aspirin  81 mg Oral Daily     atorvastatin  40 mg Oral At Bedtime     clopidogrel  75 mg Oral Daily     DULoxetine  30 mg Oral Daily     insulin aspart  1-7 Units Subcutaneous TID AC     insulin aspart  1-5 Units Subcutaneous At Bedtime     insulin glargine  26 Units Subcutaneous At Bedtime     [START ON 7/2/2019] levETIRAcetam  250 mg Oral Daily     levETIRAcetam  500 mg Oral At Bedtime     [START  "ON 2019] lisinopril  2.5 mg Oral Daily     metoprolol succinate ER  12.5 mg Oral At Bedtime     sodium chloride (PF)  3 mL Intracatheter Q8H     tamsulosin  0.4 mg Oral At Bedtime       Infusion meds      PRN meds  acetaminophen, acetaminophen, amitriptyline, glucose **OR** dextrose **OR** glucagon, lidocaine 4%, lidocaine (buffered or not buffered), melatonin, naloxone, ondansetron **OR** ondansetron, sodium chloride (PF)    Allergies   Allergies   Allergen Reactions     Oxycodone Other (See Comments)     \"TERRIBLE SWEATING\"     Sulfa Drugs      Tetracycline        Family History   Family History   Problem Relation Age of Onset     Breast Cancer Mother      Heart Failure Father 81       Social History   Social History     Tobacco Use     Smoking status: Former Smoker     Packs/day: 1.00     Years: 30.00     Pack years: 30.00     Types: Cigarettes     Last attempt to quit: 1999     Years since quittin.5     Smokeless tobacco: Never Used   Substance Use Topics     Alcohol use: Not Currently     Alcohol/week: 4.2 oz     Types: 7 Standard drinks or equivalent per week     Comment: 1 drink per biweekly     Drug use: No       Review of Systems   The 10 point Review of Systems is negative other than noted in the HPI or here.     Physical Exam     Temp:  [96.3  F (35.7  C)-98  F (36.7  C)] 96.3  F (35.7  C)  Pulse:  [68-71] 70  Heart Rate:  [63-71] 63  Resp:  [12-20] 16  BP: ()/(32-96) 114/52  SpO2:  [93 %-99 %] 95 %    Neuro:  Mental status: Laying in bed, eyes open.  Does not voluntarily participate in giving history, but responds when addressed directly.  Poor historian with respect to the event.  Oriented to person and place, but not time.  Follows 1-2 step commands, comprehension intact, repetition intact.  No neglect.  Wife feels he is back to baseline with the exception of being tired.  Cranial nerves: EOMI, visual fields full, face symmetric, facial sensation intact, shoulder shrug strong, " tongue/uvula midline, hard of hearing.  Motor: 5/5 strength in all 4 extremities without drift.  Sensory: Intact to light touch in face, arms, and legs  Coordination: Finger to nose intact bilaterally without dysmetria.  Normal heel-shin test bilaterally  Gait: Deferred      Stroke Scales      NIHSS  Interval     Interval Comments     1a. Level of Consciousness 1-->Not alert, but arousable by minor stimulation to obey, answer, or respond   1b. LOC Questions 1-->Answers one question correctly   1c. LOC Commands 0-->Performs both tasks correctly   2.   Best Gaze 0-->Normal   3.   Visual 0-->No visual loss   4.   Facial Palsy 0-->Normal symmetrical movements   5a. Motor Arm, Left 0-->No drift, limb holds 90 (or 45) degrees for full 10 secs   5b. Motor Arm, Right 0-->No drift, limb holds 90 (or 45) degrees for full 10 secs   6a. Motor Leg, Left 0-->No drift, leg holds 30 degree position for full 5 secs   6b. Motor Leg, right 0-->No drift, leg holds 30 degree position for full 5 secs   7.   Limb Ataxia 0-->Absent   8.   Sensory 0-->Normal, no sensory loss   9.   Best Language 0-->No aphasia, normal   10. Dysarthria 0-->Normal   11. Extinction and Inattention  0-->No abnormality   Total 2 (07/01/19 1843)       Data     Imaging  I personally reviewed all neuroimaging    Labs:  CBC:  Recent Labs   Lab 06/30/19 2345   WBC 7.1   RBC 4.34*   HGB 12.4*   HCT 39.1*          Basic Metabolic Panel:   Recent Labs   Lab Test 06/30/19 2345 04/08/19  1019    138   POTASSIUM 5.1 4.9   CHLORIDE 106 104   CO2 29 28   BUN 26 21   CR 0.85 0.86   * 195*   ALLISON 9.1 9.0       Liver panel:  Recent Labs   Lab Test 06/01/18  0810 05/27/18  0710   PROTTOTAL 6.6* 5.8*   ALBUMIN 2.9* 2.9*   BILITOTAL 0.6 0.3   ALKPHOS 67 60   AST 15 18   ALT 14 15       INR:  Recent Labs   Lab Test 06/30/19  2345 04/08/19  1019 12/31/17  1130   INR 1.01 1.00 0.99        Lipid Profile:  Recent Labs   Lab Test 04/09/19  0626 02/28/19  1916  12/31/17  1425   CHOL 134  --  133   HDL 47  --  43   LDL 64 87 61   TRIG 113  --  147       A1C:   Recent Labs   Lab Test 07/01/19  0607 05/30/19  1854 02/28/19  1913   A1C 8.3* 8.8* 8.7*       Troponin I:   Recent Labs   Lab Test 06/30/19  2345 06/01/18  1940 06/01/18  1605   TROPI <0.015 0.093* 0.093*            I have personally spent a total of 60 minutes providing care and consulting with this patient's medical providers today, with more than 50% of this time spent in consultation, coordination of care, and discussion with the patient and/or family regarding diagnostic results, prognosis, symptom management, risks and benefits of management options, and development of plan of care.     This was a non-emergent, non-tele stroke consult.

## 2019-07-01 NOTE — PROGRESS NOTES
Observation goals PRIOR TO DISCHARGE     Comments: -diagnostic tests and consults completed and resulted - not met. Awaiting for a neuro consult.    -vital signs normal or at patient baseline - met   -returns to baseline functional status - met    Nurse to notify provider when observation goals have been met and patient is ready for discharge.

## 2019-07-01 NOTE — PROGRESS NOTES
Observation goals PRIOR TO DISCHARGE     Comments: -diagnostic tests and consults completed and resulted - met.     -vital signs normal or at patient baseline - met   -returns to baseline functional status - met    Nurse to notify provider when observation goals have been met and patient is ready for discharge.

## 2019-07-01 NOTE — PROGRESS NOTES
Discharge criteria met. Discharge instructions discussed and explained with pt and wife. Both verbalized understanding. All questions answered. PIV removed. Pt will be leaving after eating supper.

## 2019-07-01 NOTE — ED NOTES
"[facility]  ED Nurse Handoff Report    ED Chief complaint: Altered Mental Status      ED Diagnosis:   Final diagnoses:   None       Code Status: Full Code    Allergies:   Allergies   Allergen Reactions     Oxycodone Other (See Comments)     \"TERRIBLE SWEATING\"     Sulfa Drugs      Tetracycline        Activity level - Baseline/Home:  Independent  And cane  Activity Level - Current:   Stand with Assist    Patient's Preferred language: english   Needed?: No    Isolation: No  Infection: Not Applicable  Bariatric?: No    Vital Signs:   Vitals:    06/30/19 2341 07/01/19 0041 07/01/19 0042 07/01/19 0112   BP: 175/67 168/59  (!) 165/96   Pulse:  71  68   Resp: 20  12 14   Temp: 98  F (36.7  C)      TempSrc: Oral      SpO2: 99%  96% 93%   Weight: 74.8 kg (165 lb)          Cardiac Rhythm: ,   Cardiac  Cardiac Rhythm: Normal sinus rhythm    Pain level:      Is this patient confused?: No   Hard of hearing  Does this patient have a guardian?  No         If yes, is there guardianship documents in the Epic \"Code/ACP\" activity?  N/A         Guardian Notified?  N/A  [place] - Suicide Severity Rating Scale Completed?  Yes  If yes, what color did the patient score?  White    Patient Report: Initial Complaint: Pt wife fell asleep at 2230 woke up 15min later and reported pt was not talking to her pt wife states pt feel asleep around 2000. Pt alert on arrival speech seems slow Pt was able to ambulate to the stretcher without difficulty. Pt has hx of CVA in the past and wife reports deficit was speech difficulty. Pt uses a cane at home.   Focused Assessment: possible CVA/tia  Tests Performed: lab, ct  Abnormal Results:   Results for orders placed or performed during the hospital encounter of 06/30/19   Basic metabolic panel   Result Value Ref Range    Sodium 140 133 - 144 mmol/L    Potassium 5.1 3.4 - 5.3 mmol/L    Chloride 106 94 - 109 mmol/L    Carbon Dioxide 29 20 - 32 mmol/L    Anion Gap 5 3 - 14 mmol/L    " Glucose 168 (H) 70 - 99 mg/dL    Urea Nitrogen 26 7 - 30 mg/dL    Creatinine 0.85 0.66 - 1.25 mg/dL    GFR Estimate 76 >60 mL/min/[1.73_m2]    GFR Estimate If Black 88 >60 mL/min/[1.73_m2]    Calcium 9.1 8.5 - 10.1 mg/dL   CBC with platelets differential   Result Value Ref Range    WBC 7.1 4.0 - 11.0 10e9/L    RBC Count 4.34 (L) 4.4 - 5.9 10e12/L    Hemoglobin 12.4 (L) 13.3 - 17.7 g/dL    Hematocrit 39.1 (L) 40.0 - 53.0 %    MCV 90 78 - 100 fl    MCH 28.6 26.5 - 33.0 pg    MCHC 31.7 31.5 - 36.5 g/dL    RDW 14.2 10.0 - 15.0 %    Platelet Count 189 150 - 450 10e9/L    Diff Method Automated Method     % Neutrophils 50.9 %    % Lymphocytes 37.3 %    % Monocytes 10.0 %    % Eosinophils 1.4 %    % Basophils 0.1 %    % Immature Granulocytes 0.3 %    Nucleated RBCs 0 0 /100    Absolute Neutrophil 3.6 1.6 - 8.3 10e9/L    Absolute Lymphocytes 2.7 0.8 - 5.3 10e9/L    Absolute Monocytes 0.7 0.0 - 1.3 10e9/L    Absolute Eosinophils 0.1 0.0 - 0.7 10e9/L    Absolute Basophils 0.0 0.0 - 0.2 10e9/L    Abs Immature Granulocytes 0.0 0 - 0.4 10e9/L    Absolute Nucleated RBC 0.0    INR   Result Value Ref Range    INR 1.01 0.86 - 1.14   Partial thromboplastin time   Result Value Ref Range    PTT 31 22 - 37 sec   Troponin I   Result Value Ref Range    Troponin I ES <0.015 0.000 - 0.045 ug/L     Treatments provided: . Pt speech back to normal after returning from CT. All other neuro checks WNL. Pt able to stand at bedside with SBA to give urine specimen    Family Comments:     OBS brochure/video discussed/provided to patient/family: yes              Name of person given brochure if not patient:               Relationship to patient:     ED Medications:   Medications   iopamidol (ISOVUE-370) solution 120 mL (70 mLs Intravenous Given 7/1/19 0018)   CT scan flush (100 mLs Intravenous Given 7/1/19 0018)       Drips infusing?:  No    For the majority of the shift this patient was Green.   Interventions performed were .    Severe Sepsis OR  Septic Shock Diagnosis Present: No    To be done/followed up on inpatient unit:      ED NURSE PHONE NUMBER: [phone]

## 2019-07-03 ENCOUNTER — PATIENT OUTREACH (OUTPATIENT)
Dept: CARE COORDINATION | Facility: CLINIC | Age: 84
End: 2019-07-03

## 2019-07-03 ASSESSMENT — ACTIVITIES OF DAILY LIVING (ADL): DEPENDENT_IADLS:: CLEANING;COOKING;LAUNDRY

## 2019-07-03 NOTE — PROGRESS NOTES
Clinic Care Coordination Contact  OUTREACH    Referral Information: ED Follow-Up  Primary Diagnosis: Neurological Disorders    Chief Complaint   Patient presents with     Clinic Care Coordination - Post Hospital     Discharge follow-up      Universal Utilization: Patient was admitted at [facility] from 6/30/19 to 7/1/19.He was discharged home with his significant other.    Utilization    Last refreshed: 7/10/2019  4:13 PM:  Hospital Admissions 2           Last refreshed: 7/10/2019  4:13 PM:  ED Visits 0           Last refreshed: 7/10/2019  4:13 PM:  No Show Count (past year) 1              Current as of: 7/10/2019  4:13 PM            Clinical Concerns:  Current Medical Concerns: Patient was evaluated for expressive aphasia and slurred speech. Those were resolved prior to patient's discharge. The recommendations were to follow up with PCP, Dr. Morrissey, within 7 days; and with Neurologist, in 2-4 weeks.    Current Behavioral Concerns: None    Education Provided to patient: about care coordination   Pain  Pain (GOAL):: No  Health Maintenance Reviewed: Not assessed  Clinical Pathway: None    Medication Management:  Patient did not have any questions or concerns.     Functional Status:  Dependent ADLs:: Ambulation-walker  Dependent IADLs:: Cleaning, Cooking, Laundry  Mobility Status: Independent w/Device  Fallen 2 or more times in the past year?: No  Any fall with injury in the past year?: No    Living Situation:  Current living arrangement:: I live in a private home, I live in a private home with spouse  Type of residence:: Private home - no stairs    Diet/Exercise/Sleep:  Diet:: Diabetic diet  Inadequate nutrition (GOAL):: No  Food Insecurity: No  Tube Feeding: No  Exercise:: Yes  Inadequate activity/exercise (GOAL):: No  Significant changes in sleep pattern (GOAL): No    Transportation:  Transportation concerns (GOAL):: No  Transportation means:: Family, Regular car     Psychosocial:  Evangelical or spiritual beliefs that  impact treatment:: No  Mental health DX:: Yes  Mental health DX how managed:: Medication  Mental health management concern (GOAL):: No  Informal Support system:: Significant other     Financial/Insurance concerns (GOAL):: No     Resources:  Community Resources: None  Supplies used at home:: None  Equipment Currently Used at Home: cane, straight, shower chair, grab bar, tub/shower    Advance Care Plan/Directive  Advanced Care Plans/Directives on file:: No  Advanced Care Plan/Directive Status: Not Applicable    Referrals Placed: [facility] (previously)    Interventions:  RN Care Coordinator engaged in AIDET communication with patient's significant other (and patient, whose voice could be heard in the back). She stated (and he confirmed) patient was doing well and that he is going to see Dr. Morrissey in 2 days. She asked if Dr. Morrissey could take care of patient's excessive ear wax, which was mentioned by the audiologist, she said. Writer explained to patient and his wife that concern and requested will be relayed to Dr. Morrissey.  Neurologist appointment is also scheduled, patient's S.O. Informed.    Plan: Patient will follow up with his appointment.  RN Care Coordinator will relay patient's concerns about ear wax with Dr. Morrissey.  No further outreaches will be made at this time, unless a new referral occurs and/or there are changes in the patient's status. Patient was provided with this writer's contact information and encouraged to call with questions or concerns.    Vandana Wills RN Care Coordinator  [facility]  Phone:  [phone] (Mondays, Wednesdays & Fridays)  Phone:  [phone] (Tuesdays & Thursdays)  Email: [email or web address]

## 2019-07-05 ENCOUNTER — OFFICE VISIT (OUTPATIENT)
Dept: FAMILY MEDICINE | Facility: CLINIC | Age: 84
End: 2019-07-05
Payer: MEDICARE

## 2019-07-05 VITALS
HEART RATE: 55 BPM | HEIGHT: 70 IN | SYSTOLIC BLOOD PRESSURE: 136 MMHG | OXYGEN SATURATION: 97 % | DIASTOLIC BLOOD PRESSURE: 59 MMHG | TEMPERATURE: 97.1 F | WEIGHT: 164.7 LBS | BODY MASS INDEX: 23.58 KG/M2

## 2019-07-05 DIAGNOSIS — E11.42 DM TYPE 2 WITH DIABETIC PERIPHERAL NEUROPATHY (H): ICD-10-CM

## 2019-07-05 DIAGNOSIS — H61.23 BILATERAL IMPACTED CERUMEN: ICD-10-CM

## 2019-07-05 DIAGNOSIS — G40.89 OTHER SEIZURES (H): Primary | ICD-10-CM

## 2019-07-05 DIAGNOSIS — I10 BENIGN ESSENTIAL HYPERTENSION: ICD-10-CM

## 2019-07-05 DIAGNOSIS — R55 SYNCOPE, UNSPECIFIED SYNCOPE TYPE: ICD-10-CM

## 2019-07-05 DIAGNOSIS — C44.41 BASAL CELL CARCINOMA (BCC) OF SKIN OF NECK: ICD-10-CM

## 2019-07-05 DIAGNOSIS — R47.01 APHASIA: ICD-10-CM

## 2019-07-05 PROBLEM — E08.621 DIABETIC ULCER OF LEFT FOOT ASSOCIATED WITH DIABETES MELLITUS DUE TO UNDERLYING CONDITION (H): Status: RESOLVED | Noted: 2017-06-12 | Resolved: 2019-07-05

## 2019-07-05 PROBLEM — L97.529 DIABETIC ULCER OF LEFT FOOT ASSOCIATED WITH DIABETES MELLITUS DUE TO UNDERLYING CONDITION (H): Status: RESOLVED | Noted: 2017-06-12 | Resolved: 2019-07-05

## 2019-07-05 PROCEDURE — 99214 OFFICE O/P EST MOD 30 MIN: CPT | Mod: 25 | Performed by: INTERNAL MEDICINE

## 2019-07-05 PROCEDURE — 69210 REMOVE IMPACTED EAR WAX UNI: CPT | Performed by: INTERNAL MEDICINE

## 2019-07-05 ASSESSMENT — PATIENT HEALTH QUESTIONNAIRE - PHQ9: SUM OF ALL RESPONSES TO PHQ QUESTIONS 1-9: 6

## 2019-07-05 ASSESSMENT — MIFFLIN-ST. JEOR: SCORE: 1408.32

## 2019-07-05 NOTE — PROGRESS NOTES
Subjective     Dustin Pearce is a 89 year old male who presents to clinic today for the following health issues:    [facility]       Hospital Follow-up Visit:    Hospital/Nursing Home/IP Rehab Facility: [facility]  Date of Admission: 6-  Date of Discharge: 7-1-2019  Reason(s) for Admission: Expressive aphasia and slurred speech             Problems taking medications regularly:  None       Medication changes since discharge: None       Problems adhering to non-medication therapy:  None    Summary of hospitalization:  [facility] discharge summary reviewed  Diagnostic Tests/Treatments reviewed.  Follow up needed: Neurology as directed  Other Healthcare Providers Involved in Patient s Care:         None  Update since discharge: improved.     Post Discharge Medication Reconciliation: discharge medications reconciled, continue medications without change.  Plan of care communicated with patient and family     Coding guidelines for this visit:  Type of Medical   Decision Making Face-to-Face Visit       within 7 Days of discharge Face-to-Face Visit        within 14 days of discharge   Moderate Complexity [id number]   High Complexity [id number]            89-year-old man with multiple medical problems accompanied by his significant other was hospitalized with syncope and aphasia and was thought to have recurrent seizure activity.  His Keppra dose was increased slightly.  He was instructed to follow-up with his neurologist.  He feels back to normal today.  He saw his audiologist who suggested that he see his physician for removal of cerumen from his bilateral ear canals.  His wife has noted a new skin lesion behind his right ear that has been growing rapidly.  Since our last visit, he has increased his insulin dose and his a.m. fasting blood sugars have consistently been below 130.  There have been no episodes of low blood sugar readings.    Patient Active Problem List   Diagnosis     Coronary  artery disease of native artery of native heart with stable angina pectoris (H)     Hyperlipidemia, unspecified hyperlipidemia type     Benign non-nodular prostatic hyperplasia with lower urinary tract symptoms     Gastroesophageal reflux disease without esophagitis     Cerebrovascular accident (H)     Carotid artery stenosis     Abdominal aortic aneurysm (H)     Lumbar back pain     Benign essential hypertension     TIA (transient ischemic attack)     Paroxysmal atrial fibrillation (H)     Ischemic cardiomyopathy     Aortic root dilatation (H)     Physical deconditioning     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)     Anemia due to blood loss, acute     Diarrhea, unspecified type     Nausea and vomiting, intractability of vomiting not specified, unspecified vomiting type     Acute pain of left shoulder     Balance problems     Generalized muscle weakness     Peripheral artery disease (H)     Aortic stenosis     RBBB with left anterior fascicular block     Stroke of unknown etiology (H)     Carotid stenosis     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)     History of TIA (transient ischemic attack) and stroke     UTI (urinary tract infection)     UTI (urinary tract infection) due to Enterococcus     Generalized weakness     Type 2 diabetes mellitus with complication, with long-term current use of insulin (H)     Atherosclerosis of coronary artery of native heart without angina pectoris, unspecified vessel or lesion type     Depression, unspecified depression type     Slow transit constipation     Severe sepsis (H)     Chest discomfort     NSTEMI (non-ST elevated myocardial infarction) (H)     Anemia     Cognitive impairment     Acute on chronic systolic congestive heart failure (H)     Benign prostatic hyperplasia without lower urinary tract symptoms     Other seizures (H)     Past Surgical History:   Procedure Laterality Date     AMPUTATE FOOT Left 6/4/2017    Procedure:  AMPUTATE FOOT;  Sun Add#3: partial left foot amputation - Sagital Saw - Mini C-Arm;  Surgeon: Moe Kirk DPM;  Location:  OR     CHOLECYSTECTOMY       ENDARTERECTOMY CAROTID Right 1/3/2018    Procedure: ENDARTERECTOMY CAROTID;  RIGHT CAROTID ENDARTERECTOMY WITH EEG;  Surgeon: Roland Rodriguez MD;  Location:  OR     ESOPHAGOSCOPY, GASTROSCOPY, DUODENOSCOPY (EGD), COMBINED N/A 2016    Procedure: COMBINED ESOPHAGOSCOPY, GASTROSCOPY, DUODENOSCOPY (EGD);  Surgeon: Nasim Louis MD;  Location:  GI     GENITOURINARY SURGERY      KIDNEY STONE REMOVAL X 4     HC UGI ENDOSCOPY W EUS N/A 2016    Procedure: COMBINED ENDOSCOPIC ULTRASOUND, ESOPHAGOSCOPY, GASTROSCOPY, DUODENOSCOPY (EGD);  Surgeon: Nasim Louis MD;  Location:  GI     HERNIA REPAIR       INCISION AND DRAINAGE FOOT, COMBINED Left 2017    Procedure: COMBINED INCISION AND DRAINAGE FOOT;  REVISIONAL LEFT FOOT INCISION AND DRAINAGE WITH POSSIBLE FLAP CLOSURE , ANTIBIOTIC SOLUTION ;  Surgeon: Nabil Ann DPM;  Location:  OR     LASER HOLMIUM LITHOTRIPSY URETER(S), INSERT STENT, COMBINED  3/2/2012    Procedure:COMBINED CYSTOSCOPY, URETEROSCOPY, LASER HOLMIUM LITHOTRIPSY URETER(S), INSERT STENT; CYSTOSCOPY, RIGHT RETROGRADES, RIGHT URETEROSCOPY, HOLMIUM LASER, RIGHT STENT PLACEMENT; Surgeon:ANJELICA LEÓN; Location: OR     ROTATOR CUFF REPAIR RT/LT         Social History     Tobacco Use     Smoking status: Former Smoker     Packs/day: 1.00     Years: 30.00     Pack years: 30.00     Types: Cigarettes     Last attempt to quit: 1999     Years since quittin.5     Smokeless tobacco: Never Used   Substance Use Topics     Alcohol use: Not Currently     Alcohol/week: 4.2 oz     Types: 7 Standard drinks or equivalent per week     Comment: 1 drink per biweekly     Family History   Problem Relation Age of Onset     Breast Cancer Mother      Heart Failure Father 81         Current Outpatient  Medications   Medication Sig Dispense Refill     AMITRIPTYLINE HCL PO Take 25 mg by mouth nightly as needed for sleep       aspirin EC 81 MG EC tablet Take 1 tablet (81 mg) by mouth daily 30 tablet 0     ATORVASTATIN CALCIUM PO Take 40 mg by mouth At Bedtime        bisacodyl (DULCOLAX) 10 MG Suppository Place 10 mg rectally daily as needed for constipation       blood glucose monitoring (NO BRAND SPECIFIED) meter device kit Use to test blood sugar bid times daily or as directed. 1 kit 0     blood glucose monitoring (TRUE METRIX BLOOD GLUCOSE TEST) test strip USE TO TEST BLOOD SUGAR THREE TIMES DAILY OR AS DIRECTED 300 strip 1     clopidogrel (PLAVIX) 75 MG tablet TAKE 1 TABLET BY MOUTH DAILY 90 tablet 1     DULoxetine (CYMBALTA) 30 MG EC capsule TAKE 1 CAPSULE(30 MG) BY MOUTH DAILY 90 capsule 3     insulin glargine (BASAGLAR KWIKPEN) 100 UNIT/ML pen Inject 26 Units Subcutaneous At Bedtime 15 mL 11     insulin pen needle (BD JOE U/F) 32G X 4 MM miscellaneous USE FOUR TIMES DAILY OR AS DIRECTED 350 each 3     ipratropium (ATROVENT) 0.03 % nasal spray INHALE 1 SPRAY IN EACH NOSTRIL EVERY 12 HOURS AS NEEDED 30 mL 11     levETIRAcetam (KEPPRA) 500 MG tablet Take 1 tablet (500 mg) by mouth 2 times daily 60 tablet 0     lisinopril (PRINIVIL/ZESTRIL) 2.5 MG tablet TAKE 1 TABLET(2.5 MG) BY MOUTH DAILY 90 tablet 0     MAGNESIUM-OXIDE 400 (241.3 Mg) MG tablet TAKE 1 TABLET BY MOUTH TWICE DAILY 180 tablet 0     metFORMIN (GLUCOPHAGE) 1000 MG tablet Take 1 tablet (1,000 mg) by mouth 2 times daily (with meals) 180 tablet 3     metoprolol succinate ER (TOPROL-XL) 25 MG 24 hr tablet Take 12.5 mg by mouth At Bedtime       nitroGLYcerin (NITROSTAT) 0.4 MG sublingual tablet For chest pain place 1 tablet under the tongue every 5 minutes for 3 doses. If symptoms persist 5 minutes after 1st dose call 911. 25 tablet 0     omeprazole (PRILOSEC) 40 MG DR capsule TAKE 1 CAPSULE(40 MG) BY MOUTH DAILY 30 TO 60 MINUTES BEFORE A MEAL 90 capsule  "3     order for DME Equipment being ordered: True Matrix Blood Glucose meter. 1 Act 11     polyethylene glycol (MIRALAX/GLYCOLAX) Packet Take 17 g by mouth daily as needed for constipation       tamsulosin (FLOMAX) 0.4 MG capsule Take 0.4 mg by mouth At Bedtime       Allergies   Allergen Reactions     Oxycodone Other (See Comments)     \"TERRIBLE SWEATING\"     Sulfa Drugs      Tetracycline          Reviewed and updated as needed this visit by Provider         Review of Systems   ROS COMP: Constitutional, HEENT, cardiovascular, pulmonary, gi and gu systems are negative, except as otherwise noted.      Objective    There were no vitals taken for this visit.  There is no height or weight on file to calculate BMI.  Physical Exam   General: This is a well-appearing man in no acute distress.  HEENT: Bilateral cerumen impactions were removed from the bilateral external auditory canals by Dr. Morrissey using a curette and upon so doing, the bilateral hepatic membranes were normal in appearance.  Neurological: Alert and oriented to person place and time although memory deficits are present, speech is back to baseline, cranial nerves II to XII appear grossly intact, he walks with a cane which is his baseline, there seems to be symmetric strength.  Skin: Pearly papule behind the right ear concerning for a basal cell carcinoma            Assessment & Plan     1. Other seizures (H)  Continue higher dose of Keppra, continue to follow-up with neurology as directed    2. Syncope, unspecified syncope type  Etiology unclear although seizure was favored over TIA, no changes were made to antiplatelet regimen    3. Aphasia  Resolved possibly from seizure versus TIA    4. 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 Benign essential hypertension  Under good control    6. Basal cell carcinoma (BCC) of skin of neck  This  looks " like a basal cell carcinoma needs to be excised, the patient's significant other has seen Dr. Bangura at dermatology previously, she will take him to see either Dr. Bangura or 1 of his partners  - DERMATOLOGY REFERRAL    7. Bilateral impacted cerumen  Symptoms resolved after cerumen impaction removal by Dr. Morrissey using a curette in clinic  - REMOVE IMPACTED CERUMEN           Return in about 8 weeks (around 8/29/2019) for Diabetes Check.  Or sooner as needed    Matt Morrissey MD  [facility]

## 2019-07-16 ENCOUNTER — APPOINTMENT (OUTPATIENT)
Dept: CT IMAGING | Facility: CLINIC | Age: 84
End: 2019-07-16
Attending: EMERGENCY MEDICINE
Payer: MEDICARE

## 2019-07-16 ENCOUNTER — HOSPITAL ENCOUNTER (OUTPATIENT)
Facility: CLINIC | Age: 84
Setting detail: OBSERVATION
Discharge: HOME | End: 2019-07-19
Attending: EMERGENCY MEDICINE | Admitting: HOSPITALIST
Payer: MEDICARE

## 2019-07-16 DIAGNOSIS — R47.01 EXPRESSIVE APHASIA: Primary | ICD-10-CM

## 2019-07-16 DIAGNOSIS — G40.909 SEIZURE DISORDER (H): ICD-10-CM

## 2019-07-16 DIAGNOSIS — I63.9 CEREBROVASCULAR ACCIDENT (CVA), UNSPECIFIED MECHANISM (H): ICD-10-CM

## 2019-07-16 LAB
ANION GAP SERPL CALCULATED.3IONS-SCNC: 4 MMOL/L (ref 3–14)
APTT PPP: 30 SEC (ref 22–37)
BASOPHILS # BLD AUTO: 0 10E9/L (ref 0–0.2)
BASOPHILS NFR BLD AUTO: 0.2 %
BUN SERPL-MCNC: 17 MG/DL (ref 7–30)
CALCIUM SERPL-MCNC: 9.2 MG/DL (ref 8.5–10.1)
CHLORIDE SERPL-SCNC: 107 MMOL/L (ref 94–109)
CO2 SERPL-SCNC: 31 MMOL/L (ref 20–32)
CREAT SERPL-MCNC: 0.75 MG/DL (ref 0.66–1.25)
DIFFERENTIAL METHOD BLD: ABNORMAL
EOSINOPHIL # BLD AUTO: 0.2 10E9/L (ref 0–0.7)
EOSINOPHIL NFR BLD AUTO: 2.3 %
ERYTHROCYTE [DISTWIDTH] IN BLOOD BY AUTOMATED COUNT: 14.3 % (ref 10–15)
GFR SERPL CREATININE-BSD FRML MDRD: 80 ML/MIN/{1.73_M2}
GLUCOSE BLDC GLUCOMTR-MCNC: 109 MG/DL (ref 70–99)
GLUCOSE BLDC GLUCOMTR-MCNC: 113 MG/DL (ref 70–99)
GLUCOSE SERPL-MCNC: 131 MG/DL (ref 70–99)
HCT VFR BLD AUTO: 38.9 % (ref 40–53)
HGB BLD-MCNC: 12.1 G/DL (ref 13.3–17.7)
IMM GRANULOCYTES # BLD: 0 10E9/L (ref 0–0.4)
IMM GRANULOCYTES NFR BLD: 0.2 %
INR PPP: 0.98 (ref 0.86–1.14)
INTERPRETATION ECG - MUSE: NORMAL
LYMPHOCYTES # BLD AUTO: 2.7 10E9/L (ref 0.8–5.3)
LYMPHOCYTES NFR BLD AUTO: 33 %
MCH RBC QN AUTO: 28.3 PG (ref 26.5–33)
MCHC RBC AUTO-ENTMCNC: 31.1 G/DL (ref 31.5–36.5)
MCV RBC AUTO: 91 FL (ref 78–100)
MONOCYTES # BLD AUTO: 0.9 10E9/L (ref 0–1.3)
MONOCYTES NFR BLD AUTO: 11.5 %
NEUTROPHILS # BLD AUTO: 4.3 10E9/L (ref 1.6–8.3)
NEUTROPHILS NFR BLD AUTO: 52.8 %
PLATELET # BLD AUTO: 170 10E9/L (ref 150–450)
POTASSIUM SERPL-SCNC: 4.6 MMOL/L (ref 3.4–5.3)
RBC # BLD AUTO: 4.28 10E12/L (ref 4.4–5.9)
SODIUM SERPL-SCNC: 142 MMOL/L (ref 133–144)
WBC # BLD AUTO: 8.2 10E9/L (ref 4–11)

## 2019-07-16 PROCEDURE — 93005 ELECTROCARDIOGRAM TRACING: CPT

## 2019-07-16 PROCEDURE — 25000128 H RX IP 250 OP 636: Performed by: EMERGENCY MEDICINE

## 2019-07-16 PROCEDURE — 99285 EMERGENCY DEPT VISIT HI MDM: CPT | Mod: 25

## 2019-07-16 PROCEDURE — 70498 CT ANGIOGRAPHY NECK: CPT

## 2019-07-16 PROCEDURE — 25000125 ZZHC RX 250: Performed by: EMERGENCY MEDICINE

## 2019-07-16 PROCEDURE — 96374 THER/PROPH/DIAG INJ IV PUSH: CPT

## 2019-07-16 PROCEDURE — 00000146 ZZHCL STATISTIC GLUCOSE BY METER IP

## 2019-07-16 PROCEDURE — G0378 HOSPITAL OBSERVATION PER HR: HCPCS

## 2019-07-16 PROCEDURE — 0042T CT HEAD PERFUSION WITH CONTRAST: CPT

## 2019-07-16 PROCEDURE — 85025 COMPLETE CBC W/AUTO DIFF WBC: CPT | Performed by: EMERGENCY MEDICINE

## 2019-07-16 PROCEDURE — 85610 PROTHROMBIN TIME: CPT | Performed by: EMERGENCY MEDICINE

## 2019-07-16 PROCEDURE — 80048 BASIC METABOLIC PNL TOTAL CA: CPT | Performed by: EMERGENCY MEDICINE

## 2019-07-16 PROCEDURE — 25000132 ZZH RX MED GY IP 250 OP 250 PS 637: Mod: GY | Performed by: HOSPITALIST

## 2019-07-16 PROCEDURE — 70450 CT HEAD/BRAIN W/O DYE: CPT | Mod: 59

## 2019-07-16 PROCEDURE — 85730 THROMBOPLASTIN TIME PARTIAL: CPT | Performed by: EMERGENCY MEDICINE

## 2019-07-16 PROCEDURE — 25000128 H RX IP 250 OP 636: Performed by: PSYCHIATRY & NEUROLOGY

## 2019-07-16 PROCEDURE — 99220 ZZC INITIAL OBSERVATION CARE,LEVL III: CPT | Performed by: HOSPITALIST

## 2019-07-16 PROCEDURE — 80177 DRUG SCRN QUAN LEVETIRACETAM: CPT | Performed by: EMERGENCY MEDICINE

## 2019-07-16 PROCEDURE — 99214 OFFICE O/P EST MOD 30 MIN: CPT | Performed by: PSYCHIATRY & NEUROLOGY

## 2019-07-16 RX ORDER — PROCHLORPERAZINE MALEATE 5 MG
5 TABLET ORAL EVERY 6 HOURS PRN
Status: DISCONTINUED | OUTPATIENT
Start: 2019-07-16 | End: 2019-07-19 | Stop reason: HOSPADM

## 2019-07-16 RX ORDER — BISACODYL 10 MG
10 SUPPOSITORY, RECTAL RECTAL DAILY PRN
Status: DISCONTINUED | OUTPATIENT
Start: 2019-07-16 | End: 2019-07-19 | Stop reason: HOSPADM

## 2019-07-16 RX ORDER — PROCHLORPERAZINE 25 MG
12.5 SUPPOSITORY, RECTAL RECTAL EVERY 12 HOURS PRN
Status: DISCONTINUED | OUTPATIENT
Start: 2019-07-16 | End: 2019-07-19 | Stop reason: HOSPADM

## 2019-07-16 RX ORDER — NITROGLYCERIN 0.4 MG/1
0.4 TABLET SUBLINGUAL EVERY 5 MIN PRN
Status: DISCONTINUED | OUTPATIENT
Start: 2019-07-16 | End: 2019-07-19 | Stop reason: HOSPADM

## 2019-07-16 RX ORDER — AMOXICILLIN 250 MG
1 CAPSULE ORAL 2 TIMES DAILY PRN
Status: DISCONTINUED | OUTPATIENT
Start: 2019-07-16 | End: 2019-07-19 | Stop reason: HOSPADM

## 2019-07-16 RX ORDER — DEXTROSE MONOHYDRATE 25 G/50ML
25-50 INJECTION, SOLUTION INTRAVENOUS
Status: DISCONTINUED | OUTPATIENT
Start: 2019-07-16 | End: 2019-07-19 | Stop reason: HOSPADM

## 2019-07-16 RX ORDER — AMOXICILLIN 250 MG
2 CAPSULE ORAL 2 TIMES DAILY PRN
Status: DISCONTINUED | OUTPATIENT
Start: 2019-07-16 | End: 2019-07-19 | Stop reason: HOSPADM

## 2019-07-16 RX ORDER — DULOXETIN HYDROCHLORIDE 30 MG/1
30 CAPSULE, DELAYED RELEASE ORAL DAILY
Status: DISCONTINUED | OUTPATIENT
Start: 2019-07-17 | End: 2019-07-17

## 2019-07-16 RX ORDER — INSULIN GLARGINE 100 [IU]/ML
26 INJECTION, SOLUTION SUBCUTANEOUS AT BEDTIME
Status: DISCONTINUED | OUTPATIENT
Start: 2019-07-16 | End: 2019-07-16 | Stop reason: CLARIF

## 2019-07-16 RX ORDER — NALOXONE HYDROCHLORIDE 0.4 MG/ML
.1-.4 INJECTION, SOLUTION INTRAMUSCULAR; INTRAVENOUS; SUBCUTANEOUS
Status: DISCONTINUED | OUTPATIENT
Start: 2019-07-16 | End: 2019-07-19 | Stop reason: HOSPADM

## 2019-07-16 RX ORDER — LORAZEPAM 2 MG/ML
2 INJECTION INTRAMUSCULAR ONCE
Status: COMPLETED | OUTPATIENT
Start: 2019-07-16 | End: 2019-07-16

## 2019-07-16 RX ORDER — ONDANSETRON 2 MG/ML
4 INJECTION INTRAMUSCULAR; INTRAVENOUS EVERY 6 HOURS PRN
Status: DISCONTINUED | OUTPATIENT
Start: 2019-07-16 | End: 2019-07-19 | Stop reason: HOSPADM

## 2019-07-16 RX ORDER — LEVETIRACETAM 750 MG/1
750 TABLET ORAL 2 TIMES DAILY
Status: DISCONTINUED | OUTPATIENT
Start: 2019-07-16 | End: 2019-07-17

## 2019-07-16 RX ORDER — ACETAMINOPHEN 325 MG/1
650 TABLET ORAL EVERY 4 HOURS PRN
Status: DISCONTINUED | OUTPATIENT
Start: 2019-07-16 | End: 2019-07-19 | Stop reason: HOSPADM

## 2019-07-16 RX ORDER — ONDANSETRON 4 MG/1
4 TABLET, ORALLY DISINTEGRATING ORAL EVERY 6 HOURS PRN
Status: DISCONTINUED | OUTPATIENT
Start: 2019-07-16 | End: 2019-07-19 | Stop reason: HOSPADM

## 2019-07-16 RX ORDER — LABETALOL 20 MG/4 ML (5 MG/ML) INTRAVENOUS SYRINGE
10-40 EVERY 10 MIN PRN
Status: DISCONTINUED | OUTPATIENT
Start: 2019-07-16 | End: 2019-07-19 | Stop reason: HOSPADM

## 2019-07-16 RX ORDER — CLOPIDOGREL BISULFATE 75 MG/1
75 TABLET ORAL DAILY
Status: DISCONTINUED | OUTPATIENT
Start: 2019-07-17 | End: 2019-07-19 | Stop reason: HOSPADM

## 2019-07-16 RX ORDER — ASPIRIN 81 MG/1
81 TABLET ORAL DAILY
Status: DISCONTINUED | OUTPATIENT
Start: 2019-07-17 | End: 2019-07-19 | Stop reason: HOSPADM

## 2019-07-16 RX ORDER — ATORVASTATIN CALCIUM 40 MG/1
40 TABLET, FILM COATED ORAL AT BEDTIME
Status: DISCONTINUED | OUTPATIENT
Start: 2019-07-16 | End: 2019-07-19 | Stop reason: HOSPADM

## 2019-07-16 RX ORDER — IOPAMIDOL 755 MG/ML
120 INJECTION, SOLUTION INTRAVASCULAR ONCE
Status: COMPLETED | OUTPATIENT
Start: 2019-07-16 | End: 2019-07-16

## 2019-07-16 RX ORDER — TAMSULOSIN HYDROCHLORIDE 0.4 MG/1
0.4 CAPSULE ORAL AT BEDTIME
Status: DISCONTINUED | OUTPATIENT
Start: 2019-07-16 | End: 2019-07-19 | Stop reason: HOSPADM

## 2019-07-16 RX ORDER — ACETAMINOPHEN 650 MG/1
650 SUPPOSITORY RECTAL EVERY 4 HOURS PRN
Status: DISCONTINUED | OUTPATIENT
Start: 2019-07-16 | End: 2019-07-19 | Stop reason: HOSPADM

## 2019-07-16 RX ORDER — NICOTINE POLACRILEX 4 MG
15-30 LOZENGE BUCCAL
Status: DISCONTINUED | OUTPATIENT
Start: 2019-07-16 | End: 2019-07-19 | Stop reason: HOSPADM

## 2019-07-16 RX ADMIN — IOPAMIDOL 120 ML: 755 INJECTION, SOLUTION INTRAVENOUS at 17:54

## 2019-07-16 RX ADMIN — LEVETIRACETAM 750 MG: 750 TABLET, FILM COATED ORAL at 21:47

## 2019-07-16 RX ADMIN — SODIUM CHLORIDE 100 ML: 9 INJECTION, SOLUTION INTRAVENOUS at 17:54

## 2019-07-16 RX ADMIN — LORAZEPAM 2 MG: 2 INJECTION INTRAMUSCULAR; INTRAVENOUS at 21:37

## 2019-07-16 ASSESSMENT — MIFFLIN-ST. JEOR: SCORE: 1224.88

## 2019-07-16 NOTE — CONSULTS
[facility]      Stroke Consult Note    Reason for Consult:  Stroke Code    HPI  Dustin Pearce is a 89 year old male who presents with speech difficulties/confusion. He was with his wife today when he became confused and was not answering his wife correctly. This resolved in the ED. In the ED, he does not remember why he is here and what happened earlier in the day. In the ED, he was noted to have aphasia but resolved by the time writer saw him. He also had confusion episode about 2 weeks ago and was admitted for eval with concern for seizure.    History not obtainable from patient due to confusion.    TPA Treatment   Not given due to minor / isolated / quickly resolving stroke symptoms.    Endovascular Treatment  Not initiated due to absence of proximal vessel occlusion    Impression  ?TIA/Ischemic stroke  ?Seizure  Chronic left ICA occlusion  Right carotid stenosis s/p CEA    Recommendations  -Neuro checks  -MRI brain for further eval  -Continue Aspirin and Plavix; Statin  -Increase Keppra 750 BID; EEG  -No need for TTE     Patient Follow-up    - final recommendation pending work-up      Please contact the Stroke Service with any questions.    Fritz Morley MD  Neurology    Text Page ([phone])  __________________________________________________    Past Medical History:   Diagnosis Date     Abdominal aortic aneurysm (H) 12/25/2016     Acute on chronic systolic congestive heart failure (H) 6/7/2018     Anemia 6/7/2018     Anemia due to blood loss, acute 6/13/2017     Aortic stenosis     mild     Benign essential hypertension 1/6/2017     Benign non-nodular prostatic hyperplasia with lower urinary tract symptoms 10/20/2016     CAD (coronary artery disease)     MI 1970     Carotid artery stenosis 10/20/2016    chronically occluded left internal carotid artery     Cerebrovascular accident (H) 10/20/2016     Cognitive impairment 6/7/2018     Coronary artery disease of native artery of native heart with stable  angina pectoris (H) 10/20/2016    MI 1970     Depression, unspecified depression type 2/22/2018     Diabetes mellitus (H)      Diabetic ulcer of left foot associated with diabetes mellitus due to underlying condition (H) 6/12/2017     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     Gastro-oesophageal reflux disease      Gastroesophageal reflux disease without esophagitis 10/20/2016     Heart attack (H)      Hyperlipidemia      Hyperlipidemia, unspecified hyperlipidemia type 10/20/2016     Ischemic cardiomyopathy      Lumbar back pain 12/30/2016     Mild cognitive impairment 10/20/2016     Nephrolithiasis     RECURRENT     NSTEMI (non-ST elevated myocardial infarction) (H) 6/7/2018     Osteopenia      PAD (peripheral artery disease) (H)     peripheral angiogram 5/2017: The common iliac artery stenoses were stented and the superficial femoral artery stenoses were angioplastied     Paroxysmal atrial fibrillation (H)      Peripheral artery disease (H)     peripheral angiogram 5/2017: The common iliac artery stenoses were stented and the superficial femoral artery stenoses were angioplastied     RBBB with left anterior fascicular block      Slow transit constipation 2/22/2018     Stroke of unknown etiology (H) 12/31/2017     Syncope      TIA (transient ischemic attack) 1/20/2017     Unspecified cerebral artery occlusion with cerebral infarction      UTI (urinary tract infection) due to Enterococcus 2/22/2018     Ventricular tachycardia (H)     nonsustained       Past Surgical History:   Procedure Laterality Date     AMPUTATE FOOT Left 6/4/2017    Procedure: AMPUTATE FOOT;  Sun Add#3: partial left foot amputation - Sagital Saw - Mini C-Arm;  Surgeon: Moe Kirk DPM;  Location:  OR     CHOLECYSTECTOMY       ENDARTERECTOMY CAROTID Right 1/3/2018    Procedure: ENDARTERECTOMY CAROTID;  RIGHT CAROTID ENDARTERECTOMY WITH EEG;  Surgeon: Roland Rodriguez MD;  Location:  OR     ESOPHAGOSCOPY,  GASTROSCOPY, DUODENOSCOPY (EGD), COMBINED N/A 12/26/2016    Procedure: COMBINED ESOPHAGOSCOPY, GASTROSCOPY, DUODENOSCOPY (EGD);  Surgeon: Nasim Louis MD;  Location:  GI     GENITOURINARY SURGERY      KIDNEY STONE REMOVAL X 4     HC UGI ENDOSCOPY W EUS N/A 12/29/2016    Procedure: COMBINED ENDOSCOPIC ULTRASOUND, ESOPHAGOSCOPY, GASTROSCOPY, DUODENOSCOPY (EGD);  Surgeon: Nasim Louis MD;  Location:  GI     HERNIA REPAIR       INCISION AND DRAINAGE FOOT, COMBINED Left 6/6/2017    Procedure: COMBINED INCISION AND DRAINAGE FOOT;  REVISIONAL LEFT FOOT INCISION AND DRAINAGE WITH POSSIBLE FLAP CLOSURE , ANTIBIOTIC SOLUTION ;  Surgeon: Nabil Ann DPM;  Location:  OR     LASER HOLMIUM LITHOTRIPSY URETER(S), INSERT STENT, COMBINED  3/2/2012    Procedure:COMBINED CYSTOSCOPY, URETEROSCOPY, LASER HOLMIUM LITHOTRIPSY URETER(S), INSERT STENT; CYSTOSCOPY, RIGHT RETROGRADES, RIGHT URETEROSCOPY, HOLMIUM LASER, RIGHT STENT PLACEMENT; Surgeon:ANJELICA LEÓN; Location: OR     ROTATOR CUFF REPAIR RT/LT         Medications   No current facility-administered medications for this encounter.      Current Outpatient Medications   Medication Sig     AMITRIPTYLINE HCL PO Take 25 mg by mouth nightly as needed for sleep     aspirin EC 81 MG EC tablet Take 1 tablet (81 mg) by mouth daily     ATORVASTATIN CALCIUM PO Take 40 mg by mouth At Bedtime      bisacodyl (DULCOLAX) 10 MG Suppository Place 10 mg rectally daily as needed for constipation     blood glucose monitoring (NO BRAND SPECIFIED) meter device kit Use to test blood sugar bid times daily or as directed.     blood glucose monitoring (TRUE METRIX BLOOD GLUCOSE TEST) test strip USE TO TEST BLOOD SUGAR THREE TIMES DAILY OR AS DIRECTED     clopidogrel (PLAVIX) 75 MG tablet TAKE 1 TABLET BY MOUTH DAILY     DULoxetine (CYMBALTA) 30 MG EC capsule TAKE 1 CAPSULE(30 MG) BY MOUTH DAILY     insulin glargine (BASAGLAR KWIKPEN) 100 UNIT/ML pen Inject 26 Units  "Subcutaneous At Bedtime     insulin pen needle (BD JOE U/F) 32G X 4 MM miscellaneous USE FOUR TIMES DAILY OR AS DIRECTED     ipratropium (ATROVENT) 0.03 % nasal spray INHALE 1 SPRAY IN EACH NOSTRIL EVERY 12 HOURS AS NEEDED     levETIRAcetam (KEPPRA) 500 MG tablet Take 1 tablet (500 mg) by mouth 2 times daily     lisinopril (PRINIVIL/ZESTRIL) 2.5 MG tablet TAKE 1 TABLET(2.5 MG) BY MOUTH DAILY     MAGNESIUM-OXIDE 400 (241.3 Mg) MG tablet TAKE 1 TABLET BY MOUTH TWICE DAILY     metFORMIN (GLUCOPHAGE) 1000 MG tablet Take 1 tablet (1,000 mg) by mouth 2 times daily (with meals)     metoprolol succinate ER (TOPROL-XL) 25 MG 24 hr tablet Take 12.5 mg by mouth At Bedtime     nitroGLYcerin (NITROSTAT) 0.4 MG sublingual tablet For chest pain place 1 tablet under the tongue every 5 minutes for 3 doses. If symptoms persist 5 minutes after 1st dose call 911.     omeprazole (PRILOSEC) 40 MG DR capsule TAKE 1 CAPSULE(40 MG) BY MOUTH DAILY 30 TO 60 MINUTES BEFORE A MEAL     order for DME Equipment being ordered: True Matrix Blood Glucose meter.     polyethylene glycol (MIRALAX/GLYCOLAX) Packet Take 17 g by mouth daily as needed for constipation     tamsulosin (FLOMAX) 0.4 MG capsule Take 0.4 mg by mouth At Bedtime         (Not in a hospital admission)    Allergies   Allergies   Allergen Reactions     Oxycodone Other (See Comments)     \"TERRIBLE SWEATING\"     Sulfa Drugs      Tetracycline        Family History   Family History     Problem (# of Occurrences) Relation (Name,Age of Onset)    Breast Cancer (1) Mother    Heart Failure (1) Father (81)          Social History   Social History     Socioeconomic History     Marital status:      Spouse name: Not on file     Number of children: Not on file     Years of education: Not on file     Highest education level: Not on file   Occupational History     Not on file   Social Needs     Financial resource strain: Not on file     Food insecurity:     Worry: Not on file     Inability: " Not on file     Transportation needs:     Medical: Not on file     Non-medical: Not on file   Tobacco Use     Smoking status: Former Smoker     Packs/day: 1.00     Years: 30.00     Pack years: 30.00     Types: Cigarettes     Last attempt to quit: 1999     Years since quittin.5     Smokeless tobacco: Never Used   Substance and Sexual Activity     Alcohol use: Not Currently     Alcohol/week: 4.2 oz     Types: 7 Standard drinks or equivalent per week     Comment: 1 drink per biweekly     Drug use: No     Sexual activity: Not Currently     Partners: Female   Lifestyle     Physical activity:     Days per week: Not on file     Minutes per session: Not on file     Stress: Not on file   Relationships     Social connections:     Talks on phone: Not on file     Gets together: Not on file     Attends [employer] service: Not on file     Active member of club or organization: Not on file     Attends meetings of clubs or organizations: Not on file     Relationship status: Not on file     Intimate partner violence:     Fear of current or ex partner: Not on file     Emotionally abused: Not on file     Physically abused: Not on file     Forced sexual activity: Not on file   Other Topics Concern     Parent/sibling w/ CABG, MI or angioplasty before 65F 55M? Not Asked   Social History Narrative     Not on file          ROS  Review of systems not obtained due to patient factors - confusion    PHYSICAL EXAMINATION  B/P:     142/56 (19 180)    Heart Rate: 59 (19)    Pulse: 60 (19)   Resp:  20 (19)  Temp: 98.7  F (37.1  C)   Oral (19)    General:  patient lying in bed without any acute distress    HEENT:  normocephalic/atraumatic  Cardio:  RRR  Pulmonary:  no respiratory distress  Abdomen:  soft  Extremities:  no edema  Skin:  intact     Neurologic  Mental Status:  alert, oriented x 3, follows commands, speech clear and fluent, naming and repetition normal  Cranial Nerves:  visual  fields intact, PERRL, EOMI with normal smooth pursuit, facial sensation intact and symmetric, facial movements symmetric, hearing not formally tested but intact to conversation, palate elevation symmetric and uvula midline, no dysarthria, shoulder shrug strong bilaterally, tongue protrusion midline  Motor:  normal muscle tone and bulk, no abnormal movements, able to move all limbs spontaneously, strength 5/5 throughout upper and lower extremities, no pronator drift  Reflexes:  toes down-going  Sensory:  light touch sensation intact and symmetric throughout upper and lower extremities, no extinction on double simultaneous stimulation   Coordination:  normal finger-to-nose and heel-to-shin bilaterally without dysmetria, rapid alternating movements symmetric  Station/Gait:  deferred    Stroke Scales      NIHSS  Interval     Interval Comments     1a. Level of Consciousness     1b. LOC Questions     1c. LOC Commands     2.   Best Gaze     3.   Visual     4.   Facial Palsy     5a. Motor Arm, Left     5b. Motor Arm, Right     6a. Motor Leg, Left     6b. Motor Leg, right     7.   Limb Ataxia     8.   Sensory     9.   Best Language     10. Dysarthria     11. Extinction and Inattention      Total         Labs/Imaging  Labs and imaging were reviewed and used in developing plan; pertinent results included.  Data   CBC  WBC (10e9/L)   Date Value   07/16/2019 8.2   06/30/2019 7.1   04/08/2019 10.1    RBC Count (10e12/L)   Date Value   07/16/2019 4.28 (L)   06/30/2019 4.34 (L)   04/08/2019 4.38 (L)    Hemoglobin (g/dL)   Date Value   07/16/2019 12.1 (L)   06/30/2019 12.4 (L)   04/08/2019 12.7 (L)      Hematocrit (%)   Date Value   07/16/2019 38.9 (L)   06/30/2019 39.1 (L)   04/08/2019 40.1    Platelet Count (10e9/L)   Date Value   07/16/2019 170   06/30/2019 189   04/08/2019 179         BMP  Sodium (mmol/L)   Date Value   07/16/2019 142   06/30/2019 140   04/08/2019 138    Potassium (mmol/L)   Date Value   07/16/2019 4.6    06/30/2019 5.1   04/08/2019 4.9    Chloride (mmol/L)   Date Value   07/16/2019 107   06/30/2019 106   04/08/2019 104      Carbon Dioxide (mmol/L)   Date Value   07/16/2019 31   06/30/2019 29   04/08/2019 28    Glucose (mg/dL)   Date Value   07/16/2019 131 (H)   06/30/2019 168 (H)   04/08/2019 195 (H)    Urea Nitrogen (mg/dL)   Date Value   07/16/2019 17   06/30/2019 26   04/08/2019 21      Creatinine (mg/dL)   Date Value   07/16/2019 0.75   06/30/2019 0.85   04/08/2019 0.86    Calcium (mg/dL)   Date Value   07/16/2019 9.2   06/30/2019 9.1   04/08/2019 9.0         INR Troponin I A1C   INR (no units)   Date Value   07/16/2019 0.98   06/30/2019 1.01   04/08/2019 1.00    Troponin I ES (ug/L)   Date Value   06/30/2019 <0.015   06/01/2018 0.093 (H)   06/01/2018 0.093 (H)    Hemoglobin A1C (%)   Date Value   07/01/2019 8.3 (H)   05/30/2019 8.8 (H)   02/28/2019 8.7 (H)        Liver Panel  Protein Total (g/dL)   Date Value   06/01/2018 6.6 (L)   05/27/2018 5.8 (L)   05/26/2018 7.0    Albumin (g/dL)   Date Value   06/01/2018 2.9 (L)   05/27/2018 2.9 (L)   05/26/2018 3.5    Bilirubin Total (mg/dL)   Date Value   06/01/2018 0.6   05/27/2018 0.3   05/26/2018 0.3      Alkaline Phosphatase (U/L)   Date Value   06/01/2018 67   05/27/2018 60   05/26/2018 69    AST (U/L)   Date Value   06/01/2018 15   05/27/2018 18   05/26/2018 20    ALT (U/L)   Date Value   06/01/2018 14   05/27/2018 15   05/26/2018 18      No results found for: BILIDIRECT       Lipid Profile  Cholesterol (mg/dL)   Date Value   04/09/2019 134   12/31/2017 133   12/01/2017 142    HDL Cholesterol (mg/dL)   Date Value   04/09/2019 47   12/31/2017 43   12/01/2017 66    LDL Cholesterol Calculated (mg/dL)   Date Value   04/09/2019 64   12/31/2017 61   12/01/2017 39     LDL Cholesterol Direct (mg/dL)   Date Value   02/28/2019 87      Triglycerides (mg/dL)   Date Value   04/09/2019 113   12/31/2017 147   12/01/2017 186 (H)    Cholesterol/HDL Ratio (no units)   Date Value    12/18/2015 5.1 (H)   12/05/2014 3.3   11/22/2013 3.7               I have personally spent a total of 50 minutes providing care and consulting with this patient's medical providers today, with more than 50% of this time spent in consultation, coordination of care, and discussion with the patient and/or family regarding diagnostic results, prognosis, symptom management, risks and benefits of management options, and development of plan of care..     Stroke Code Data  (for stroke code without tele)  Stroke code activated 07/16/19   1735   First stroke provider response 07/16/19   1738   Last known normal 07/16/19   1635   Time of discovery   (or onset of symptoms) 07/16/19   1635   Head CT read by me 07/16/19   1750   Was stroke code de-escalated? Yes 07/16/19 1803  symptoms not likely caused by stroke

## 2019-07-16 NOTE — ED NOTES
"[facility]  ED Nurse Handoff Report    ED Chief complaint: Aphasia      ED Diagnosis:   Final diagnoses:   Cerebrovascular accident (CVA), unspecified mechanism (H)       Code Status: Full Code    Allergies:   Allergies   Allergen Reactions     Oxycodone Other (See Comments)     \"TERRIBLE SWEATING\"     Sulfa Drugs      Tetracycline        Activity level - Baseline/Home:  Independent  Activity Level - Current:   Independent    Patient's Preferred language: English   Needed?: No    Isolation: No  Infection: Not Applicable  Bariatric?: No    Vital Signs:   Vitals:    07/16/19 1735 07/16/19 1804 07/16/19 1805 07/16/19 1807   BP: 128/70 142/56     Pulse:  60     Resp: 18  20    Temp:    98.7  F (37.1  C)   TempSrc:    Oral   SpO2: 92%  94%    Weight:    64.3 kg (141 lb 12.1 oz)       Cardiac Rhythm: ,   Cardiac  Cardiac Rhythm: Sinus bradycardia    Pain level:      Is this patient confused?: No   Does this patient have a guardian?  No         If yes, is there guardianship documents in the Epic \"Code/ACP\" activity?  N/A         Guardian Notified?  N/A  [place] - Suicide Severity Rating Scale Completed?  Yes  If yes, what color did the patient score?  White    Patient Report: Initial Complaint: Pt presented via [facility] for eval after episode of aphagia/limited responsiveness. Pt was having hearing test performed, answering questions as requested by staff. Pt suddenly stopped answering and appeared unable. Last known well 1545  Focused Assessment: +aphagia/doesnt remember appointment.   Tests Performed:CT, labs  Abnormal Results: See results review.    Treatments provided: None    Family Comments: Wife at beside    OBS brochure/video discussed/provided to patient/family: N/A              Name of person given brochure if not patient: Na              Relationship to patient: NA    ED Medications:   Medications   iopamidol (ISOVUE-370) solution 120 mL (120 mLs Intravenous Given 7/16/19 " 1754)   Saline (100 mLs As instructed Given 7/16/19 1754)       Drips infusing?:  No    For the majority of the shift this patient was Green.   Interventions performed were NA.    Severe Sepsis OR Septic Shock Diagnosis Present: No    To be done/followed up on inpatient unit:  Na    ED NURSE PHONE NUMBER: [id number]

## 2019-07-16 NOTE — ED TRIAGE NOTES
Pt at hearing appointment, was answering questions appropriately, then suddenly stopped. Pt appeared to aphagic to staff and wife. Last known well 1635.

## 2019-07-16 NOTE — ED PROVIDER NOTES
"  History     Chief Complaint:  Aphasia    The history is provided by the EMS personnel and the patient. The history is limited by the condition of the patient.      Dustin Pearce is a 89 year old male with a complex history, see below, who presents to the emergency department via EMS for evaluation of aphasia. To note, two weeks ago, the patient had an episode where he sat down in a chair and was unable to get up. He was seen in the ED for that, but his workup came back negative. Prior to arrival, the patient was at a hearing appointment when the tech noticed that all of the sudden he was unable to answer questions. He had difficultly speaking and was only able to say \"okay.\" His wife also noticed that it looked like he had some hard time walking due to potentially being unbalanced, which is not normal for him. EMS was called, found his BS to be 142 and his HR to be between 50-70. They transferred him here for further evaluation.     Here, the patient states that \"something is not right,\" but denies a headache or any other pain.     Allergies:  Oxycodone  Sulfa drugs   Tetracycline     Medications:    Amitriptyline  Aspirin 81 mg tablet  Dulcolax  Plavix  Cymbalta  Insulin glargine  Atrovent   Lisinopril  Metformin  Toprol   Nitrostat  Prilosec  Miralax     Past Medical History:    NSTEMI  Anemia   Cognitive impairment  CHF  Prostatic hyperplasia  Severe sepsis  UTI  DM type 2  CAD  Depression   Stenosis of right internal carotid artery with cerebral infarction  TIA  Carotid stenosis  Stroke  Peripheral artery disease  Aortic stenosis  RBBB  Balance problems  Anemia   Physical deconditioning  A-fib  Ischemic cardiomyopathy  Aortic root dilatation  HTN  Abdominal aortic aneurysm   Hyperlipidemia   GERD  CVA  MI   Nephrolithiasis   Osteopenia     Past Surgical History:    Partial left foot amputation   Cholecystectomy  Right carotid endarterectomy   EGD  Genitourinary surgery  UGI endoscopy  Hernia repair  Foot " "IND  Ureter lithotripsy, insertion of stent combined  Rotator cuff repair     Family History:    Breast cancer  Heart failure - father     Social History:  Former tobacco user. Quit date: 1999  Positive for alcohol use.   Negative for drug use.  Marital Status:  .      ROS:  Unable to obtain due to speech difficulty    Physical Exam     Patient Vitals for the past 24 hrs:   BP Temp Temp src Pulse Heart Rate Resp SpO2 Weight   07/16/19 1807 -- 98.7  F (37.1  C) Oral -- -- -- -- 64.3 kg (141 lb 12.1 oz)   07/16/19 1805 -- -- -- -- 59 20 94 % --   07/16/19 1804 142/56 -- -- 60 -- -- -- --   07/16/19 1735 128/70 -- -- -- 62 18 92 % --     Physical Exam  Constitutional: Elderly white male, supine.  HENT: No signs of trauma.   Eyes: EOM are normal. Pupils are equal, round, and reactive to light.   Neck: Normal range of motion. No JVD present. No cervical adenopathy.  Cardiovascular: Regular rhythm.  Exam reveals no gallop and no friction rub.  1+ radial and femoral pulses bilaterally  No murmur heard.  Pulmonary/Chest: Bilateral breath sounds normal. No wheezes, rhonchi or rales.  Abdominal: Soft. No tenderness. No rebound or guarding.   Musculoskeletal: No edema. No tenderness.   Lymphadenopathy: No lymphadenopathy.   Neurological: Alert and oriented to person and place, but not able to state the date or year. Normal strength. Coordination normal. No facial asymmetry. paraphasias when repeating \"[employer]...\" No drift. Normal finger-nose-finger test. Normal sensation.  Skin: Skin is warm and dry. No rash noted. Periumbilical ecchymosis.    Emergency Department Course   ECG:  Indication: paraphasia  Vent. Rate 59 bpm. TX interval 170. QRS duration 146. QT/QTc 488/483. P-R-T axis 73 -71 30. Sinus bradycardia. LAHB. Right bundle branch block. Read time: 1810    Imaging:  CT Head without contrast:   Chronic changes. No evidence for intracranial hemorrhage or any acute process. As per radiology.    CTA Head " Neck Angio with contrast:   1. Persistent occlusion of the left internal carotid artery at its origin.  2. Slight improvement in previously seen moderately narrowed left P2 posterior cerebral artery.  3. No evidence for any acute thromboembolism. As per radiology.    CT Head Perfusion with contrast:   No change from prior cerebral perfusion imaging. No evidence for any acute infarct or any acute ischemia. As per radiology.     Laboratory:  CBC: WBC: 8.2, HGB: 12.1, PLT: 170  BMP: Glucose 131, o/w WNL (Creatinine: 0.75)    Keppra Level: Pending  INR: 0.98  PTT: 30    Emergency Department Course:  Nursing notes and vitals reviewed. 1735 I performed an exam of the patient as documented above.     IV inserted. Blood drawn. This was sent to the lab for further testing, results above.    The patient was sent for a head and neck CT while in the emergency department, findings above.     EKG obtained in the ED, see results above.     1741 Code stroke was called.    1742 I consulted with Dr. Morley, stroke/neuro, regarding the patient's history and presentation here in the emergency department.    1800 I rechecked the patient and discussed the results of his workup thus far.     1829  I consulted with Dr. Whitaker of the hospitalist services. They are in agreement to accept the patient for admission.    1845 I rechecked the patient and discussed the results of his workup thus far.     Findings and plan explained to the Patient and spouse who consents to admission. Discussed the patient with Dr. Whitaker, who will admit the patient to a neuro bed for further monitoring, evaluation, and treatment.    Impression & Plan    Medical Decision Making:  Dustin Pearce is a 89-year-old male at the audiologist when he stopped being able to speak properly. Paramedics were called and they noticed some expressive aphasia and transported the patient here. The wife thought the patient may have been walking a little abnormal as well. On exam here,  he is alert and oriented x2. He has good strength and sensation. He has normal finger-nose and drift. However, he has problems with repetition, paraphasia as noted. Code stroke was called and he went for scans, which show no acute problems. His symptoms have improved but still not perfect. Stroke/neuro was here to see the patient. He will be admitted. He does not appear to be a thrombolytic candidate and he was without further evaluation on the floor.     Diagnosis:    ICD-10-CM    1. Cerebrovascular accident (CVA), unspecified mechanism (H) I63.9        Disposition:  Admitted to Dr. Whitaker    Scribe Disclosure:  Cari BOYD, am serving as a scribe on 7/16/2019 at 7:41 PM to personally document services performed by Mikel Lomas MD based on my observations and the provider's statements to me.     Cari Jauregui  7/16/2019    EMERGENCY DEPARTMENT       Mikel Lomas MD  07/16/19 0876

## 2019-07-16 NOTE — ED NOTES
Bed: ST  Expected date:   Expected time:   Means of arrival:   [facility]45 min [facility]

## 2019-07-17 ENCOUNTER — APPOINTMENT (OUTPATIENT)
Dept: SPEECH THERAPY | Facility: CLINIC | Age: 84
End: 2019-07-17
Attending: HOSPITALIST
Payer: MEDICARE

## 2019-07-17 ENCOUNTER — APPOINTMENT (OUTPATIENT)
Dept: OCCUPATIONAL THERAPY | Facility: CLINIC | Age: 84
End: 2019-07-17
Attending: HOSPITALIST
Payer: MEDICARE

## 2019-07-17 ENCOUNTER — APPOINTMENT (OUTPATIENT)
Dept: MRI IMAGING | Facility: CLINIC | Age: 84
End: 2019-07-17
Attending: HOSPITALIST
Payer: MEDICARE

## 2019-07-17 LAB
ALBUMIN SERPL-MCNC: 3.2 G/DL (ref 3.4–5)
ALP SERPL-CCNC: 83 U/L (ref 40–150)
ALT SERPL W P-5'-P-CCNC: 18 U/L (ref 0–70)
AST SERPL W P-5'-P-CCNC: 17 U/L (ref 0–45)
BILIRUB DIRECT SERPL-MCNC: 0.1 MG/DL (ref 0–0.2)
BILIRUB SERPL-MCNC: 0.5 MG/DL (ref 0.2–1.3)
GLUCOSE BLDC GLUCOMTR-MCNC: 111 MG/DL (ref 70–99)
GLUCOSE BLDC GLUCOMTR-MCNC: 126 MG/DL (ref 70–99)
GLUCOSE BLDC GLUCOMTR-MCNC: 136 MG/DL (ref 70–99)
GLUCOSE BLDC GLUCOMTR-MCNC: 143 MG/DL (ref 70–99)
GLUCOSE BLDC GLUCOMTR-MCNC: 180 MG/DL (ref 70–99)
GLUCOSE BLDC GLUCOMTR-MCNC: 250 MG/DL (ref 70–99)
LEVETIRACETAM SERPL-MCNC: 24 UG/ML (ref 12–46)
PROT SERPL-MCNC: 6.5 G/DL (ref 6.8–8.8)

## 2019-07-17 PROCEDURE — 99225 ZZC SUBSEQUENT OBSERVATION CARE,LEVEL II: CPT | Performed by: HOSPITALIST

## 2019-07-17 PROCEDURE — 92610 EVALUATE SWALLOWING FUNCTION: CPT | Mod: GN | Performed by: SPEECH-LANGUAGE PATHOLOGIST

## 2019-07-17 PROCEDURE — 25000132 ZZH RX MED GY IP 250 OP 250 PS 637: Mod: GY | Performed by: HOSPITALIST

## 2019-07-17 PROCEDURE — 00000146 ZZHCL STATISTIC GLUCOSE BY METER IP

## 2019-07-17 PROCEDURE — G0378 HOSPITAL OBSERVATION PER HR: HCPCS

## 2019-07-17 PROCEDURE — 25800030 ZZH RX IP 258 OP 636: Performed by: PHYSICIAN ASSISTANT

## 2019-07-17 PROCEDURE — 80076 HEPATIC FUNCTION PANEL: CPT | Performed by: HOSPITALIST

## 2019-07-17 PROCEDURE — 95816 EEG AWAKE AND DROWSY: CPT

## 2019-07-17 PROCEDURE — A9585 GADOBUTROL INJECTION: HCPCS | Performed by: HOSPITALIST

## 2019-07-17 PROCEDURE — 40000061 ZZH STATISTIC EEG TIME EA 10 MIN

## 2019-07-17 PROCEDURE — 96376 TX/PRO/DX INJ SAME DRUG ADON: CPT

## 2019-07-17 PROCEDURE — 70553 MRI BRAIN STEM W/O & W/DYE: CPT

## 2019-07-17 PROCEDURE — 97165 OT EVAL LOW COMPLEX 30 MIN: CPT | Mod: GO

## 2019-07-17 PROCEDURE — 36415 COLL VENOUS BLD VENIPUNCTURE: CPT | Performed by: HOSPITALIST

## 2019-07-17 PROCEDURE — 99214 OFFICE O/P EST MOD 30 MIN: CPT | Performed by: PSYCHIATRY & NEUROLOGY

## 2019-07-17 PROCEDURE — 99207 ZZC CDG-CODE CATEGORY CHANGED: CPT | Performed by: HOSPITALIST

## 2019-07-17 PROCEDURE — 96375 TX/PRO/DX INJ NEW DRUG ADDON: CPT

## 2019-07-17 PROCEDURE — 25000131 ZZH RX MED GY IP 250 OP 636 PS 637: Mod: GY | Performed by: HOSPITALIST

## 2019-07-17 PROCEDURE — 25000131 ZZH RX MED GY IP 250 OP 636 PS 637: Mod: GY | Performed by: INTERNAL MEDICINE

## 2019-07-17 PROCEDURE — 25000128 H RX IP 250 OP 636: Performed by: PHYSICIAN ASSISTANT

## 2019-07-17 PROCEDURE — 96372 THER/PROPH/DIAG INJ SC/IM: CPT | Mod: 59

## 2019-07-17 PROCEDURE — 25500064 ZZH RX 255 OP 636: Performed by: HOSPITALIST

## 2019-07-17 PROCEDURE — 92526 ORAL FUNCTION THERAPY: CPT | Mod: GN | Performed by: SPEECH-LANGUAGE PATHOLOGIST

## 2019-07-17 RX ORDER — GADOBUTROL 604.72 MG/ML
6 INJECTION INTRAVENOUS ONCE
Status: COMPLETED | OUTPATIENT
Start: 2019-07-17 | End: 2019-07-17

## 2019-07-17 RX ORDER — DULOXETIN HYDROCHLORIDE 20 MG/1
20 CAPSULE, DELAYED RELEASE ORAL DAILY
Status: DISCONTINUED | OUTPATIENT
Start: 2019-07-17 | End: 2019-07-19 | Stop reason: HOSPADM

## 2019-07-17 RX ADMIN — GADOBUTROL 6 ML: 604.72 INJECTION INTRAVENOUS at 01:21

## 2019-07-17 RX ADMIN — TAMSULOSIN HYDROCHLORIDE 0.4 MG: 0.4 CAPSULE ORAL at 21:52

## 2019-07-17 RX ADMIN — ASPIRIN 81 MG: 81 TABLET, COATED ORAL at 09:23

## 2019-07-17 RX ADMIN — INSULIN GLARGINE 10 UNITS: 100 INJECTION, SOLUTION SUBCUTANEOUS at 00:00

## 2019-07-17 RX ADMIN — LEVETIRACETAM 750 MG: 100 INJECTION, SOLUTION INTRAVENOUS at 10:47

## 2019-07-17 RX ADMIN — ATORVASTATIN CALCIUM 40 MG: 40 TABLET, FILM COATED ORAL at 21:51

## 2019-07-17 RX ADMIN — INSULIN ASPART 1 UNITS: 100 INJECTION, SOLUTION INTRAVENOUS; SUBCUTANEOUS at 18:43

## 2019-07-17 RX ADMIN — DULOXETINE HYDROCHLORIDE 20 MG: 20 CAPSULE, DELAYED RELEASE ORAL at 18:32

## 2019-07-17 RX ADMIN — OMEPRAZOLE 40 MG: 20 CAPSULE, DELAYED RELEASE ORAL at 09:23

## 2019-07-17 RX ADMIN — CLOPIDOGREL BISULFATE 75 MG: 75 TABLET ORAL at 09:23

## 2019-07-17 RX ADMIN — LEVETIRACETAM 750 MG: 100 INJECTION, SOLUTION INTRAVENOUS at 21:58

## 2019-07-17 RX ADMIN — METOPROLOL SUCCINATE 12.5 MG: 25 TABLET, EXTENDED RELEASE ORAL at 21:51

## 2019-07-17 RX ADMIN — INSULIN GLARGINE 10 UNITS: 100 INJECTION, SOLUTION SUBCUTANEOUS at 21:59

## 2019-07-17 NOTE — PROGRESS NOTES
MD Notification    Notified Person: MD    Notified Person Name: Milli    Notification Date/Time: 7/17 1430    Notification Interaction: page    Purpose of Notification: FYI pt increasingly lethargic today, wife wondering if it is the Keppra    Orders Received:    Comments:

## 2019-07-17 NOTE — PLAN OF CARE
"Pt lethargic, only oriented to self, VSS on RA. Denies pain. Neuros: slow speech, Lime, word finding difficulty at times, vision impairment due to macular degeneration, slow finger-nose and heel-shin. Tele NSR with BBB.  Seizure precautions, no seizure activity this shift. Up in chair for meals. Up A1-2 with GB and cane. Voiding per urinal. Neurology aware much more lethargic today - encouraged \"sleep hygiene\" tonight and may adjust meds tomorrow. . DD1 pureed, honey thick liquids. IV SL. Continue to  monitor.  "

## 2019-07-17 NOTE — PLAN OF CARE
PT: Orders received, chart reviewed, discussed with OT. OT currently recommending ARC d/t pt currently being below baseline with cognition and mobility. Pt remains under observation status, chart indicates increased lethargy this afternoon. Not appropriate for PT at this time. Will continue to follow.

## 2019-07-17 NOTE — PROGRESS NOTES
07/17/19 0849   Quick Adds   Type of Visit Initial Occupational Therapy Evaluation   Living Environment   Lives With significant other   Living Arrangements house  (town home)   Transportation Anticipated family or friend will provide   Living Environment Comment 3 steps in and 7 steps to main level. tub shower.    Self-Care   Usual Activity Tolerance moderate   Current Activity Tolerance fair   Regular Exercise No   Equipment Currently Used at Home shower chair;cane, straight;walker, standard;wheelchair, manual   Activity/Exercise/Self-Care Comment SO reports pt uses cane most the time however she does push him in transport chair for longer distances   Functional Level   Ambulation 1-->assistive equipment   Transferring 1-->assistive equipment   Toileting 0-->independent   Bathing 3-->assistive equipment and person   Dressing 0-->independent   Eating 0-->independent   Communication 0-->understands/communicates without difficulty   Swallowing 0-->swallows foods/liquids without difficulty   Cognition 1 - attention or memory deficits   Fall history within last six months no   Which of the above functional risks had a recent onset or change? cognition;ambulation;transferring;toileting;dressing   Prior Functional Level Comment IND with use of cane prior. SO provides supervision with bathing.    General Information   Onset of Illness/Injury or Date of Surgery - Date 07/16/19   Referring Physician Bronson Whitaker MD   Patient/Family Goals Statement return home   Additional Occupational Profile Info/Pertinent History of Current Problem admitted for confusion and aphasia. MRI negative.    Precautions/Limitations fall precautions   Cognitive Status Examination   Orientation person   Level of Consciousness alert   Follows Commands (Cognition) follows one step commands;50-74% accuracy   Memory impaired   Attention Distractible during evaluation   Organization/Problem Solving Sequencing impaired;Problem solving  impaired   Executive Function Impulsive   Visual Perception   Visual Perception Wears glasses   Visual Perception Comments  macular degeneration at baseline   Sensory Examination   Sensory Quick Adds No deficits were identified   Pain Assessment   Patient Currently in Pain No   Integumentary/Edema   Integumentary/Edema no deficits were identifed   Range of Motion (ROM)   ROM Quick Adds No deficits were identified   Strength   Manual Muscle Testing Quick Adds No deficits were identified   Muscle Tone Assessment   Muscle Tone Quick Adds No deficits were identified   Coordination   Upper Extremity Coordination No deficits were identified   Mobility   Bed Mobility Bed mobility skill: Supine to sit;Bed mobility skill: Sit to supine   Bed Mobility Skill: Sit to Supine   Level of [place]: Sit/Supine contact guard   Bed Mobility Skill: Supine to Sit   Level of [place]: Supine/Sit contact guard   Transfer Skill: Sit to Stand   Level of [place]: Sit/Stand minimum assist (75% patients effort)   Upper Body Dressing   Level of [place]: Dress Upper Body independent   Lower Body Dressing   Level of [place]: Dress Lower Body moderate assist (50% patients effort)   Grooming   Level of [place]: Grooming minimum assist (75% patients effort)   Eating/Self Feeding   Level of [place]: Eating independent   Instrumental Activities of Daily Living (IADL)   IADL Comments SO A with IADL's as needed   Activities of Daily Living Analysis   Impairments Contributing to Impaired Activities of Daily Living cognition impaired;balance impaired   General Therapy Interventions   Planned Therapy Interventions ADL retraining;cognition;transfer training   Clinical Impression   Criteria for Skilled Therapeutic Interventions Met yes, treatment indicated   OT Diagnosis dec IND with ADL's and transfers   Influenced by the following impairments cognition, impaired balance    Assessment of Occupational Performance 5 or more  "Performance Deficits   Identified Performance Deficits LE dressing, toileting, toilet transfer, g/h tasks, shower transfer   Clinical Decision Making (Complexity) Low complexity   Therapy Frequency 3x/week   Predicted Duration of Therapy Intervention (days/wks) 2 days   Anticipated Discharge Disposition Acute Rehabilitation Facility   Risks and Benefits of Treatment have been explained. Yes   Patient, Family & other staff in agreement with plan of care Yes   [facility] TM \"6 Clicks\"   2016, Trustees of [facility], under license to [employer].  All rights reserved.   6 Clicks Short Forms Daily Activity Inpatient Short Form   [facility]  \"6 Clicks\" Daily Activity Inpatient Short Form   1. Putting on and taking off regular lower body clothing? 2 - A Lot   2. Bathing (including washing, rinsing, drying)? 2 - A Lot   3. Toileting, which includes using toilet, bedpan or urinal? 2 - A Lot   4. Putting on and taking off regular upper body clothing? 3 - A Little   5. Taking care of personal grooming such as brushing teeth? 3 - A Little   6. Eating meals? 4 - None   Daily Activity Raw Score (Score out of 24.Lower scores equate to lower levels of function) 16   Total Evaluation Time   Total Evaluation Time (Minutes) 20     "

## 2019-07-17 NOTE — H&P
Admitted:     07/16/2019      PRIMARY CARE PHYSICIAN:  Dr. Matt Morrissey.      CHIEF COMPLAINT:  Confusion, speech difficulty.      HISTORY OF PRESENT ILLNESS:  History is slightly limited from the patient as he does not recall the events leading to his admission.  History was reviewed mostly from his wife present by the bedside.  Mr. Dustin Pearce is a 89-year-old male with a past medical history as listed below, notably for CAD, history of TIA, CVA, diabetes, hypertension, dyslipidemia, seizure, history of a right CEA, history of chronic left carotid artery occlusion, who was brought to the ER today by EMS for confusion and expressive aphasia.  He was recently in the hospital about 2 weeks ago when he had presented with aphasia and confusion, which was transient.  He was evaluated by Neurology at that time.  MRI was negative for acute stroke.  There was concern for a seizure, so his Keppra was increased to 500 mg p.o. b.i.d. and he was continued on his antiplatelets. Today he was at his hearing appointment and during the hearing test, he could not say his birth date.  He was noted to be confused and was having some expressive aphasia and words and speech were gibberish and difficult to understand.  EMS was called and this episode lasted for about 30-45 minutes.  By the time he presented to the ER, his symptoms were almost completely resolved.  A code stroke was called.  Initial CT scans were unremarkable.  Neurology evaluated the patient and Dr. Morley recommended a further stroke workup and to increase his Keppra.  Hospitalist was thus requested admission for further evaluation.  He denies any headache, no nausea or vomiting.  Denies fever, chills, or rigors.  No chest pain or shortness of breath.  Denies pain in the abdomen.  Reports a normal bowel and bladder habit.      REVIEW OF SYSTEMS:  A 10-point review of system was done and was negative apart from those mentioned in the history of present illness.      PAST  MEDICAL HISTORY:   1.  History of TIA and accident.   2.  Chronic left carotid artery and occlusion.   3.  History of right carotid artery stenosis, status post CEA in 2017.   4.  Seizure.   5.  Status post MODESTA to LAD and circumflex.   6.  Chronic systolic congestive heart failure, not on diuresis.   7.  Diabetes.   8.  Hypertension.   9.  Dyslipidemia.   10.  Chronic depression.   11.  Gastroesophageal reflux disease.   12.  BPH.      MEDICATIONS PRIOR TO ADMISSION:  Prior to Admission medications    Medication Sig Last Dose Taking? Auth Provider   AMITRIPTYLINE HCL PO Take 25 mg by mouth nightly as needed for sleep prn Yes Unknown, Entered By History   aspirin EC 81 MG EC tablet Take 1 tablet (81 mg) by mouth daily 7/16/2019 at Unknown time Yes Tra Reynoso MD   atorvastatin (LIPITOR) 20 MG tablet Take 2 tablets (40 mg) by mouth daily  Yes Pat Garcia MD   bisacodyl (DULCOLAX) 10 MG Suppository Place 10 mg rectally daily as needed for constipation  Yes Reported, Patient   clopidogrel (PLAVIX) 75 MG tablet TAKE 1 TABLET BY MOUTH DAILY 7/16/2019 at Unknown time Yes Matt Morrissey MD   DULoxetine (CYMBALTA) 30 MG EC capsule TAKE 1 CAPSULE(30 MG) BY MOUTH DAILY 7/16/2019 at Unknown time Yes Matt Morrissey MD   insulin glargine (BASAGLAR KWIKPEN) 100 UNIT/ML pen Inject 26 Units Subcutaneous At Bedtime 7/15/2019 at Unknown time Yes Matt Morrissey MD   ipratropium (ATROVENT) 0.03 % nasal spray INHALE 1 SPRAY IN EACH NOSTRIL EVERY 12 HOURS AS NEEDED  Yes Matt Morrissey MD   levETIRAcetam (KEPPRA) 750 MG tablet Take 1 tablet (750 mg) by mouth 2 times daily  Yes Pat Garcia MD   lisinopril (PRINIVIL/ZESTRIL) 2.5 MG tablet TAKE 1 TABLET(2.5 MG) BY MOUTH DAILY 7/16/2019 at Unknown time Yes Matt Morrissey MD   MAGNESIUM-OXIDE 400 (241.3 Mg) MG tablet TAKE 1 TABLET BY MOUTH TWICE DAILY 7/16/2019 at am Yes Matt Morrissey MD   metFORMIN (GLUCOPHAGE) 1000 MG tablet Take 1 tablet (1,000 mg)  by mouth 2 times daily (with meals) 7/16/2019 at am Yes Nils Ricks MD   metoprolol succinate ER (TOPROL-XL) 25 MG 24 hr tablet Take 12.5 mg by mouth At Bedtime 7/15/2019 at Unknown time Yes Unknown, Entered By History   nitroGLYcerin (NITROSTAT) 0.4 MG sublingual tablet For chest pain place 1 tablet under the tongue every 5 minutes for 3 doses. If symptoms persist 5 minutes after 1st dose call 911.  Yes Brandyn Graves PA   omeprazole (PRILOSEC) 40 MG DR capsule TAKE 1 CAPSULE(40 MG) BY MOUTH DAILY 30 TO 60 MINUTES BEFORE A MEAL 7/16/2019 at Unknown time Yes Matt Morrissey MD   polyethylene glycol (MIRALAX/GLYCOLAX) Packet Take 17 g by mouth daily as needed for constipation  Yes Reported, Patient   tamsulosin (FLOMAX) 0.4 MG capsule Take 0.4 mg by mouth At Bedtime 7/15/2019 at Unknown time Yes Unknown, Entered By History   blood glucose monitoring (NO BRAND SPECIFIED) meter device kit Use to test blood sugar bid times daily or as directed.   Matt Morrissey MD   blood glucose monitoring (TRUE METRIX BLOOD GLUCOSE TEST) test strip USE TO TEST BLOOD SUGAR THREE TIMES DAILY OR AS DIRECTED   Matt Morrissey MD   order for DME Equipment being ordered: True Matrix Blood Glucose meter.   Matt Morrissey MD          ALLERGIES:  OXYCODONE, SULFA DRUGS, TETRACYCLINE.      SOCIAL HISTORY:  He quit smoking about 20 years ago, prior to that had a 30-pack-year smoking history, takes occasional alcohol, no illicit drug use.      FAMILY HISTORY:  Reviewed and not pertinent to current presentation.      PHYSICAL EXAMINATION:   GENERAL:  The patient is conscious, alert, oriented to place and person, was slightly confused with the year, but knew the month and date and easily got reoriented.  Lying comfortably in bed in no apparent distress.   VITAL SIGNS:  Temperature 98.7, heart rate of 60, blood pressure 142/56, saturation 94% on room air.   HEENT:  Pupils are equal and reactive to light and accommodation.   Extraocular movements are intact.  Oral mucosa is moist.   NECK:  Supple, no raised JVD.   RESPIRATORY:  Lung sounds bilaterally clear to auscultation, no wheezes or crepitation.   CARDIOVASCULAR:  Normal S1-S2, regular rate and rhythm, no murmur.   ABDOMEN:  Soft, nontender, nondistended, no guarding, rigidity, or rebound tenderness.   LOWER EXTREMITIES:  With no edema.   NEUROLOGIC:  No focal neurological deficits noted.  Cranial nerves II-XII grossly intact.   PSYCHIATRIC:  Normal mood and affect.      LABORATORY AND IMAGING:  Reviewed in Epic.  CBC and BMP are mostly unremarkable.  Blood glucose 131.  CT head without contrast showed chronic changes, no evidence for intracranial hemorrhage or acute process.  Does note old left frontal infarct.  CT angiogram of head and neck noted with persistent occlusion of left internal carotid artery at its origin.  Slight improvement in previously seen moderately narrowed left P2 posterior cerebral artery.  CT head with contrast showed no evidence for acute infarct or ischemia.  EKG reviewed by me shows sinus bradycardia, right bundle branch block.      ASSESSMENT AND PLAN:      Mr. Dustin Pearce is a 89-year-old male with past medical history significant for diabetes, hypertension, dyslipidemia, anxiety, depression, prior TIA, recent admission for probable TIA, probable seizure, chronic systolic congestive heart failure, severe 3-vessel disease with history of MODESTA to proximal LAD and proximal left circumflex, BPH, depression, history of chronic left carotid artery occlusion, history of right CEA, who was brought to the ER by EMS for confusion and expressive aphasia, which was transient.     1.  Transient expressive aphasia and confusion, likely TIA.   2.  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   3.  History of seizure disorder.   4.  Chronic left ICA occlusion.   5.  History of right carotid stenosis, status post CEA.    - We will admit him for observation.  His symptoms have  completely resolved at this time apart from mild confusion.  CT/CTA as noted above.  Neurology has evaluated.  We will get an MRI brain and EEG as recommended.  Will continue with his aspirin, Plavix and statin when verified by pharmacy.  N.p.o. until speech or bedside evaluation, Neurology recommended increasing his Keppra to 750 mg p.o. b.i.d.  Given his recent workup, no need for TTE.  Neurology to follow.  We will get PT, OT evaluation and  for disposition planning.     6.  Diabetes mellitus.    - We will resume his PTA Lantus when verified by pharmacy.  Hold off on metformin.     7.  Hypertension.    - We will hold off on his Toprol-XL for now to allow some permissive hypertension.  We will have labetalol p.r.n.     8.  Dyslipidemia.  Continue atorvastatin.     9.  Seizure disorder  - Keppra increased to 750 mg p.o. b.i.d. per Neurology recommendation and will also obtain EEG.  We will have seizure precautions.     10.  Gastroesophageal reflux disease.  Continue Prilosec.   11.  Benign prostatic hypertrophy.  Continue Flomax.   12.  Depression.  Continue Cymbalta.     13.  History of systolic congestive heart failure.    - He currently does not look volume overloaded and is not on any PTA diuretics.  Will monitor clinically.     14.  Benign prostatic hypertrophy.  Continue Flomax.   15.  Deep venous thrombosis prophylaxis:  Mechanical with PCD boots.      CODE STATUS:  Full code.         DANGELO MULLEN MD             D: 2019   T: 2019   MT: MARCELL      Name:     SID AGUIAR   MRN:      [id number]        Account:      [id number]   :      1928        Admitted:     2019                   Document: [id number]       cc: Matt Morrissey MD

## 2019-07-17 NOTE — PLAN OF CARE
Pt here with new WFD and confusion. A&O x3 upon arrival, disoriented to time, slow WFD noted. Other neuros intact with slow/deliberate finger/nose, heel/shin. NIH newly score 4. MD notified of WFD, see note. Elevated BP, but within parameters, other VSS. Originally passed bed side swallow. After taking first medication (Keppra) patient began to cough repeatitively. Decreased to NPO diet pending SLP eval. Normally takes meds whole with water. Up with extensive assist x1 w/ GB, would benefit with walker. Denies pain. MRI list completed and faxed. Plan for MRI, EEG, therapies, and neuro consult in AM. Wife given patient's code. Continue monitoring.

## 2019-07-17 NOTE — PROVIDER NOTIFICATION
"MD Notification    Notified Person: MD    Notified Person Name: Dr. Morley    Notification Date/Time: 7/16/19 at 2102    Notification Interaction: web-based paging    Purpose of Notification: \"740-1 - RF - Patient is having WFD again, disoriented to time and age. NIH went from 0 to 4. Please advise.   VAN Merlos, *[phone]\"    Orders Received: Give 2mg IV Ativan x1 now    Comments:    "

## 2019-07-17 NOTE — PROGRESS NOTES
RECEIVING UNIT ED HANDOFF REVIEW    ED Nurse Handoff Report was reviewed by: Cait Raman on July 16, 2019 at 7:05 PM

## 2019-07-17 NOTE — PROGRESS NOTES
07/17/19 0949   General Information   Onset Date 07/16/19   Start of Care Date 07/17/19   Referring Physician Dr. Whitaker   Patient Profile Review/OT: Additional Occupational Profile Info See Profile for full history and prior level of function   Patient/Family Goals Statement Agreed to POC goal.  No additional goal stated.   Swallowing Evaluation Bedside swallow evaluation   Behaviorial Observations   (Aphasia, apraxia noted)   Mode of current nutrition NPO   Respiratory Status Room air   Comments Per MD note: Dustin Pearce is a 89 year old male who presents with speech difficulties/confusion. He was with his wife today when he became confused and was not answering his wife correctly. This resolved in the ED. In the ED, he does not remember why he is here and what happened earlier in the day. In the ED, he was noted to have aphasia but resolved by the time writer saw him. He also had confusion episode about 2 weeks ago and was admitted for eval with concern for seizure.        Hx of video swallow study 12/2016 with mild-min dysphagia found, regular diet and thin liquids recommended.    12/2017 clinical eval and tx completed with DDL3 and thin recommended, no straw.    SO and pt report no recent difficulties with a regular diet and thin liquids except very infrequent cough at times.  No pneumonias reported.   Clinical Swallow Evaluation   Oral Musculature unable to assess due to poor participation/comprehension  (oral apraxia impacted ability to complete exam)   Dentition present and adequate   Laryngeal Function Cough;Throat clear;Swallow;Voicing initiated;Dry swallow palpated  (Mild decreased elevation)   Clinical Swallow Eval: Thin Liquid Texture Trial   Mode of Presentation, Thin Liquids cup   Volume of Liquid or Food Presented sips x 5   Oral Phase of Swallow Premature pharyngeal entry   Pharyngeal Phase of Swallow reduction in laryngeal movement  (delay)   Diagnostic Statement increased wet vocal quality,  feeding assist required   Clinical Swallow Eval: Nectar Thick Liquid Texture Trial   Mode of Presentation, Nectar spoon   Volume of Nectar Presented tsps x 3   Oral Phase, Nectar Premature pharyngeal entry  (holding)   Pharyngeal Phase, Nectar reduction in laryngeal movement  (delay)   Diagnostic Statement overt cough x 1, mod-max assist/cues provided   Clinical Swallow Eval: Honey Thick Liquid Texture Trial   Mode of Presentation, Honey spoon   Volume of Honey Presented tsps x 5   Oral Phase, Honey Premature pharyngeal entry  (holding)   Pharyngeal Phase, Honey reduction in laryngeal movement  (delay)   Diagnostic Statement no signs of aspiration, mod-max assist/cues provided   Clinical Swallow Eval: Puree Solid Texture Trial   Mode of Presentation, Puree spoon   Volume of Puree Presented tsps x 7   Oral Phase, Puree Premature pharyngeal entry  (holding)   Pharyngeal Phase, Puree reduction in laryngeal movement  (delay)   Diagnostic Statement no signs of aspiration, mod-max assist/cues provided   Swallow Compensations   Swallow Compensations Pacing;Reduce amounts;Effortful swallow   Esophageal Phase of Swallow   Patient reports or presents with symptoms of esophageal dysphagia No   Swallow Eval: Clinical Impressions   Skilled Criteria for Therapy Intervention Skilled criteria met.  Treatment indicated.   Functional Assessment Scale (FAS) 3   Treatment Diagnosis moderate oral-pharyngeal dysphagia   Diet texture recommendations Dysphagia diet level 1;Honey thick liquids   Recommended Feeding/Eating Techniques   (see below)   Therapy Frequency Daily   Predicted Duration of Therapy Intervention (days/wks) 1 week   Anticipated Discharge Disposition inpatient rehabilitation facility   Risks and Benefits of Treatment have been explained. Yes   Patient, family and/or staff in agreement with Plan of Care Yes   Clinical Impression Comments A bedside swallow eval was completed this am.  Patient presents with moderate  oral-pharyngeal dysphagia at bedside.  Deficits/risk factors include hx of mild dysphagia, oral apraxia, bolus holding, delayed swallows, mild decreased laryngeal elevation, wet vocal quality with thin liquids by cup, and cough x 1 with nectar by spoon. Pt tolerated honey thick liquids by spoon and pudding with mod-max assist/cues.  Recommend a dysphagia diet level 1 and honey thick liquids with 1:1 supervision/assist, sit at 90 degrees, liquids by spoon, verify/cue swallows, slow rate, crush meds and give with puree, hold po if aspiration signs noted.   Plan to continue swallow Tx to assess po tolerance and trial upgraded solids and liquids as indicated.  Will evaluate speech-language function as able.   Total Evaluation Time   Total Evaluation Time (Minutes) 15

## 2019-07-17 NOTE — PROVIDER NOTIFICATION
Call to pharmacy to verify if 30mg Cymbalta capsule can be opened and placed in pudding. Pharmacist stated that only th 20mg dose can be opened. Page sent to MD to notify of this update.    Addendum: MD will modify order to 20 mg.

## 2019-07-17 NOTE — PROGRESS NOTES
Hospitalist Pardeep Cover  7/16/2019    Notified of this patient admitted earlier today. NPO and PTA lantus at 26 units ordered in the evening. Current glucose 113. Hemoglobin !A1c on 7/1/19 8.3%. Still should have some basal level of insulin, especially since his metformin is on hold. Decreased lantus to 10 units this evening. Can reassess tomorrow based on speech eval and diet tolerated. No need for IVF at this time.      Zandra Baires MD

## 2019-07-17 NOTE — PHARMACY-ADMISSION MEDICATION HISTORY
Admission medication history interview status for the 7/16/2019  admission is complete. See EPIC admission navigator for prior to admission medications     Medication history source reliability:Good    Actions taken by pharmacist (provider contacted, etc):None     Additional medication history information not noted on PTA med list :  --Med list provided by wife, and went over with her.    Medication reconciliation/reorder completed by provider prior to medication history? No    Time spent in this activity: 10 min    Prior to Admission medications    Medication Sig Last Dose Taking? Auth Provider   AMITRIPTYLINE HCL PO Take 25 mg by mouth nightly as needed for sleep prn Yes Unknown, Entered By History   aspirin EC 81 MG EC tablet Take 1 tablet (81 mg) by mouth daily 7/16/2019 at Unknown time Yes Tra Reynoso MD   ATORVASTATIN CALCIUM PO Take 40 mg by mouth At Bedtime  7/15/2019 at Unknown time Yes Reported, Patient   bisacodyl (DULCOLAX) 10 MG Suppository Place 10 mg rectally daily as needed for constipation  Yes Reported, Patient   clopidogrel (PLAVIX) 75 MG tablet TAKE 1 TABLET BY MOUTH DAILY 7/16/2019 at Unknown time Yes Matt Morrissey MD   DULoxetine (CYMBALTA) 30 MG EC capsule TAKE 1 CAPSULE(30 MG) BY MOUTH DAILY 7/16/2019 at Unknown time Yes Matt Morrissey MD   insulin glargine (BASAGLAR KWIKPEN) 100 UNIT/ML pen Inject 26 Units Subcutaneous At Bedtime 7/15/2019 at Unknown time Yes Matt Morrissey MD   ipratropium (ATROVENT) 0.03 % nasal spray INHALE 1 SPRAY IN EACH NOSTRIL EVERY 12 HOURS AS NEEDED  Yes Matt Morrissey MD   levETIRAcetam (KEPPRA) 500 MG tablet Take 1 tablet (500 mg) by mouth 2 times daily 7/16/2019 at am Yes Nils Ricks MD   lisinopril (PRINIVIL/ZESTRIL) 2.5 MG tablet TAKE 1 TABLET(2.5 MG) BY MOUTH DAILY 7/16/2019 at Unknown time Yes Matt Morrissey MD   MAGNESIUM-OXIDE 400 (241.3 Mg) MG tablet TAKE 1 TABLET BY MOUTH TWICE DAILY 7/16/2019 at am Yes Matt Morrissey  MD MELVIN   metFORMIN (GLUCOPHAGE) 1000 MG tablet Take 1 tablet (1,000 mg) by mouth 2 times daily (with meals) 7/16/2019 at am Yes Nils Ricks MD   metoprolol succinate ER (TOPROL-XL) 25 MG 24 hr tablet Take 12.5 mg by mouth At Bedtime 7/15/2019 at Unknown time Yes Unknown, Entered By History   nitroGLYcerin (NITROSTAT) 0.4 MG sublingual tablet For chest pain place 1 tablet under the tongue every 5 minutes for 3 doses. If symptoms persist 5 minutes after 1st dose call 911.  Yes Brandyn Graves PA   omeprazole (PRILOSEC) 40 MG DR capsule TAKE 1 CAPSULE(40 MG) BY MOUTH DAILY 30 TO 60 MINUTES BEFORE A MEAL 7/16/2019 at Unknown time Yes Matt Morrissey MD   polyethylene glycol (MIRALAX/GLYCOLAX) Packet Take 17 g by mouth daily as needed for constipation  Yes Reported, Patient   tamsulosin (FLOMAX) 0.4 MG capsule Take 0.4 mg by mouth At Bedtime 7/15/2019 at Unknown time Yes Unknown, Entered By History   blood glucose monitoring (NO BRAND SPECIFIED) meter device kit Use to test blood sugar bid times daily or as directed.   Matt Morrissey MD   blood glucose monitoring (TRUE METRIX BLOOD GLUCOSE TEST) test strip USE TO TEST BLOOD SUGAR THREE TIMES DAILY OR AS DIRECTED   Matt Morrissey MD   insulin pen needle (BD JOE U/F) 32G X 4 MM miscellaneous USE FOUR TIMES DAILY OR AS DIRECTED   Matt Morrissey MD   order for DME Equipment being ordered: True Matrix Blood Glucose meter.   Matt Morrissey MD

## 2019-07-17 NOTE — PROGRESS NOTES
[facility]    Vascular Neurology Progress Note      Hospital Course   Mr. Pearce presented to the ED the evening of 7/16 for evaluation of an episode of speech difficulty and confusion that had resolved upon arrival. CT and MRI are without acute findings. Of note, the patient has had multiple ED visits with similar symptoms, the most recent 2 weeks ago. Workup completed at this time was unrevealing for TIA/stroke and determined seizure to be the most likely cause of his symptoms.      Interval History   Today Mr. Pearce is drowsy. Per his wife, his confusion and speech difficulties have improved, however, he has been excessively tired since he received Ativan prior to his MRI last night.     Assessment & Plan     Encephalopathy: suspect due to seizure or toxic/metabolic cause. Unlikely to be TIA due to the stereotyped nature of his three previous admissions for acute speech difficulties and confusion. No stroke seen on brain MRI, but the old cortical infarct could certainly be seizure focus  - Awaiting results of EEG  - Keppra dose increased to 750 mg BID yesterday- continue this for now  - Unless rapid improvement today, consider general neurology consult for encephalopathy/seizure eval    History of prior strokes  - Continue atorvastatin 40 mg  - Could utilize Plavix alone for secondary stroke prevention, defer to primary/cardiology for indication for ongoing dual antiplatlet. Does not need DAPT from neuro perspective  - Goal normotension  - Most recent A1c 8.3, Goal <7 for secondary stroke prevention      No further stroke evaluation is recommended, so we will sign off. Please contact us with any additional questions.    Scribe Disclosure:    I, Kalyani Butler, am serving as a scribe to document services personally performed by Diane Miranda PA-C on 7/17/19 at 1100, based on my observations and the provider's statements to me.     Kalyani Butler  7/17/19, 3989    Diane Miranda,  "VANGIE  Neurology  07/17/2019 3:35 PM  Text Page (8am-5pm)  To page stroke neurology after hours or on a subsequent day, click here: [facility]  Choose \"On Call\" tab at top, then search dropdown box for \"Neurology Adult\" & press Enter    ____________________________________________________________________      Medications     Scheduled medications    aspirin  81 mg Oral Daily     atorvastatin  40 mg Oral At Bedtime     clopidogrel  75 mg Oral Daily     DULoxetine  20 mg Oral Daily     DULoxetine  30 mg Oral Daily     insulin aspart  1-7 Units Subcutaneous TID AC     insulin aspart  1-5 Units Subcutaneous At Bedtime     insulin glargine  10 Units Subcutaneous At Bedtime     levETIRAcetam  750 mg Intravenous Q12H     omeprazole  40 mg Oral QAM     tamsulosin  0.4 mg Oral At Bedtime       Infusion medications    - MEDICATION INSTRUCTIONS -         PRN medications  Current Facility-Administered Medications   Medication Dose Route Frequency     acetaminophen  650 mg Rectal Q4H PRN     acetaminophen  650 mg Oral Q4H PRN     bisacodyl  10 mg Rectal Daily PRN     glucose  15-30 g Oral Q15 Min PRN    Or     dextrose  25-50 mL Intravenous Q15 Min PRN    Or     glucagon  1 mg Subcutaneous Q15 Min PRN     labetalol  10-40 mg Intravenous Q10 Min PRN     magnesium hydroxide  30 mL Oral Daily PRN     - MEDICATION INSTRUCTIONS -   Does not apply Continuous PRN     melatonin  1 mg Oral At Bedtime PRN     naloxone  0.1-0.4 mg Intravenous Q2 Min PRN     nitroGLYcerin  0.4 mg Sublingual Q5 Min PRN     ondansetron  4 mg Oral Q6H PRN    Or     ondansetron  4 mg Intravenous Q6H PRN     prochlorperazine  5 mg Intravenous Q6H PRN    Or     prochlorperazine  5 mg Oral Q6H PRN    Or     prochlorperazine  12.5 mg Rectal Q12H PRN     senna-docusate  1 tablet Oral BID PRN    Or     senna-docusate  2 tablet Oral BID PRN       Allergies   Allergies   Allergen Reactions     Oxycodone Other (See Comments)     \"TERRIBLE SWEATING\"     Sulfa Drugs      " Tetracycline        Physical Exam     Temp:  [97.7  F (36.5  C)-98.9  F (37.2  C)] 97.7  F (36.5  C)  Pulse:  [54-60] 54  Heart Rate:  [54-78] 78  Resp:  [16-20] 16  BP: (102-160)/(56-71) 102/71  SpO2:  [92 %-96 %] 96 %    General:  patient lying in bed without any acute distress, drowsy, falls asleep mid-conversation    HEENT:  normocephalic/atraumatic  Pulmonary:  no respiratory distress    Neurologic  Mental Status:  Drowsy, able to arouse but only for short time, knows location and age, states month is August, follows some commands before falling asleep, correctly names objects, repetition intact, speech is slowed   Cranial Nerves:  visual fields intact, PERRL, EOMI with normal smooth pursuit, facial movements symmetric, no dysarthria, tongue protrusion midline, mild/mod hearing loss  Motor:  no abnormal movements, able to move all limbs spontaneously  Reflexes:  Deferred  Sensory:  light touch sensation intact and symmetric throughout upper and lower extremities, sensation appears intact - difficulty completing exam as patient kept falling asleep  Coordination:  Deferred   Station/Gait:  deferred      Data     Imaging  I personally reviewed the following imaging:     MRI Brain 7/17:   1.  Negative for acute intracranial process.   2.  Chronic left frontal encephalomalacia.   3.  Atrophy and chronic vascular changes in the supra- and infratentorial compartments.    EEG 7/17:   pending    Labs:  CBC:  Recent Labs   Lab 07/16/19 1739   WBC 8.2   RBC 4.28*   HGB 12.1*   HCT 38.9*          Basic Metabolic Panel:   Recent Labs   Lab Test 07/16/19  1739 06/30/19  2345    140   POTASSIUM 4.6 5.1   CHLORIDE 107 106   CO2 31 29   BUN 17 26   CR 0.75 0.85   * 168*   ALLISON 9.2 9.1       Liver panel:  Recent Labs   Lab Test 06/01/18  0810 05/27/18  0710   PROTTOTAL 6.6* 5.8*   ALBUMIN 2.9* 2.9*   BILITOTAL 0.6 0.3   ALKPHOS 67 60   AST 15 18   ALT 14 15       INR:  Recent Labs   Lab Test 07/16/19  8339  06/30/19  2345 04/08/19  1019   INR 0.98 1.01 1.00        Lipid Profile:  Recent Labs   Lab Test 04/09/19  0626 02/28/19  1913 12/31/17  1425   CHOL 134  --  133   HDL 47  --  43   LDL 64 87 61   TRIG 113  --  147       A1C:   Recent Labs   Lab Test 07/01/19  0607 05/30/19  1854 02/28/19  1913   A1C 8.3* 8.8* 8.7*       Troponin I:   Recent Labs   Lab Test 06/30/19  2345 06/01/18  1940 06/01/18  1605   TROPI <0.015 0.093* 0.093*

## 2019-07-17 NOTE — PROGRESS NOTES
[facility]  Hospitalist Progress Note   07/17/2019          Assessment and Plan:       Dustin Pearce is a 89-year-old male with medical history significant for diabetes, hypertension, dyslipidemia, anxiety, depression, prior TIA, recent admission for probable TIA, probable seizure, chronic systolic congestive heart failure, CAD, BPH, history of chronic left carotid artery occlusion admitted on 7/16/2019 for confusion and expressive aphasia.    Transient expressive aphasia and confusion ? TIA, ischemic stroke.   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   Seizure disorder  Chronic left ICA occlusion.   History of right carotid stenosis, status post CEA.   Patient does not remember why he is here and what happened earlier in the day.  Electrolytes, blood glucose within normal limits.  Troponin undetectable.  CT head no acute pathology.  note old left frontal infarct.    CT angiogram of head and neck noted with persistent occlusion of left internal carotid artery at its origin. Slight improvement in previously seen moderately narrowed left P2 posterior cerebral artery.    CT head with contrast showed no evidence for acute infarct or ischemia.    EKG reviewed by me shows sinus bradycardia, right bundle branch block.   MRI brain - no acute pathology     Continue to monitor for neurochecks.  Telemetry monitoring overnight.  Pending EEG. Seizure precautions.   Continue Keppra 750 mg twice daily.  Continue PTA aspirin and Plavix.  Continue PTA Lipitor.  LDL 64, HDL 47.  No need for transthoracic echocardiogram given recent work-up in April 2019.  [LVEF estimated at 45 to 50%.]  Speech therapy, PT, OT eval.  Social work assistance with transition of care.  Neurology following along.  Appreciate recommendation.    History of systolic congestive heart failure.   Severe 3-vessel disease with history of MODESTA to proximal LAD and proximal left circumflex  Echocardiogram April 2019 with EF of 45 to 50%.   He currently does  not look volume overloaded and is not on any PTA diuretics.    Will monitor clinically.   Continue PTA metoprolol, atorvastatin.  Continue PTA aspirin and Plavix.    Hypertension.    Restart PTA Toprol-XL.  Current IV labetalol as needed.    Diabetes mellitus. Hemoglobin A1c 8.3.  PTA Basaglar 25 units, metformin.  Started on insulin Lantus 10 units at bedtime with insulin sliding scale.  Monitor blood sugars and will optimize insulin regimen.    Dyslipidemia.    Continue atorvastatin.     Benign prostatic hypertrophy.    Continue Flomax.     Gastroesophageal reflux disease.    Continue Prilosec.     Depression.    Continue Cymbalta.     Orders Placed This Encounter      Dysphagia Diet Level 1 Pureed Honey Thickened Liquids (pre-thickened or use instant food thickener) (by spoon)      DVT Prophylaxis: SCD, ambulate.  Code Status: Full Code  Disposition: Expected discharge in 1 to 2 days pending clinical improvement.    Patient, interdisciplinary team involved in care and agrees with plan.  Total time > 25 min.Discussed with patient, bedside RN    Pat Garcia MD        Interval History:      Appears comfortable lying in bed.  Is confused, not clear why he is in the hospital.  Denies any chest pain or shortness of breath.  No new weakness.  No incontinence of bowel or bladder.  No seizures noted.         Physical Exam:        Physical Exam   Temp:  [97.6  F (36.4  C)-98.9  F (37.2  C)] 97.7  F (36.5  C)  Pulse:  [54-60] 54  Heart Rate:  [54-78] 57  Resp:  [16-20] 16  BP: (102-160)/(56-71) 125/60  SpO2:  [92 %-96 %] 93 %    Intake/Output Summary (Last 24 hours) at 7/17/2019 1403  Last data filed at 7/17/2019 1100  Gross per 24 hour   Intake 360 ml   Output 300 ml   Net 60 ml     Admission Weight: 64.3 kg (141 lb 12.1 oz)  Current Weight: 64.3 kg (141 lb 12.1 oz)    PHYSICAL EXAM  GENERAL: Patient is in no distress.  confused, speech appears normal.  HEENT:  Pupils equal  HEART: Regular rate and rhythm. S1S2. No  murmurs  LUNGS: Clear to auscultation bilaterally. No expiratory wheeze.  NEURO: Cranial nerves II-XII intact. Motor and sensory system in normal range  EXTREMITIES: No pedal edema. 2+ peripheral pulses.  SKIN: Warm, dry.   PSYCHIATRY Cooperative       Medications:          aspirin  81 mg Oral Daily     atorvastatin  40 mg Oral At Bedtime     clopidogrel  75 mg Oral Daily     DULoxetine  20 mg Oral Daily     DULoxetine  30 mg Oral Daily     insulin aspart  1-7 Units Subcutaneous TID AC     insulin aspart  1-5 Units Subcutaneous At Bedtime     insulin glargine  10 Units Subcutaneous At Bedtime     levETIRAcetam  750 mg Intravenous Q12H     omeprazole  40 mg Oral QAM     tamsulosin  0.4 mg Oral At Bedtime     acetaminophen, acetaminophen, bisacodyl, glucose **OR** dextrose **OR** glucagon, labetalol, magnesium hydroxide, - MEDICATION INSTRUCTIONS -, melatonin, naloxone, nitroGLYcerin, ondansetron **OR** ondansetron, prochlorperazine **OR** prochlorperazine **OR** prochlorperazine, senna-docusate **OR** senna-docusate         Data:      All new lab and imaging data was reviewed.

## 2019-07-17 NOTE — PROCEDURES
Procedure Date: 2019      ELECTROENCEPHALOGRAM       EEG # [id number], portable      DATE OF RECORDIN/17       DURATION OF RECORDIN minutes.       CLINICAL HISTORY:  This patient is a 89-year-old male who presented with confusion spells.  EEG was requested for evaluation for seizures versus encephalopathy.      CURRENT MEDICATIONS:  Aspirin, Lipitor, Keppra, Plavix, Cymbalta.      TECHNICAL SUMMARY: This EEG recording was performed with 23 scalp electrodes in 10-20 electrode system placements, and additional scalp, precordial and other surface electrodes used for electrical referencing and artifact detection.     BACKGROUND ACTIVITIES:  The background activities of this EEG were symmetric and consisted of moderate generalized slowing with mostly theta activities around 6 Hz.  No well-defined posterior dominant rhythm was observed during maximal wakefulness.  Hyperventilation was not performed.  Photic stimulation produced no driving responses.      Sleep stages were recorded with well-formed vertex sharp transients, sleep spindles.      No interictal epileptiform activities were observed.      ICTAL ACTIVITIES:  No seizures were recorded.      IMPRESSION:  This EEG is abnormal due to the presence of moderate generalized slowing of the background activities.  No interictal epileptiform activities or seizures were recorded.         VLAD NORIEGA MD             D: 2019   T: 2019   MT: GABRIELA      Name:     SID AGUIAR   MRN:      [id number]        Account:        [id number]   :      1928           Procedure Date: 2019      Document: [id number]

## 2019-07-17 NOTE — PLAN OF CARE
Pt here with TIA vs SZR w/ chronic L ICA occlusion and R carotid stenosis CEA. A&O to self and place. Disoriented to situation and time, speech slow and WFD. [place]. Neuros includes baseline blurred vision from mac degeneration, slow and deliberate finger to nose/heel to shin. VSS. Tele NSR BBB. DD1 diet, Honey thick liquids. Takes pills crushed w/ pudding. Needs assistance. Up with 2/gb/cane/walker. Denies pain. Restarted IV in RUE forearm to run IV Keppra. SZR prec, no activity noted. EEG this AM. Discharge pending workup, neurology following.

## 2019-07-17 NOTE — PLAN OF CARE
Discharge Planner SLP   Patient plan for discharge: Did not state  Current status: A bedside swallow eval was completed this am.  Patient presents with moderate oral-pharyngeal dysphagia at bedside.  Deficits/risk factors include hx of mild dysphagia, oral apraxia, bolus holding, delayed swallows, mild decreased laryngeal elevation, wet vocal quality with thin liquids by cup, and cough x 1 with nectar by spoon. Pt tolerated honey thick liquids by spoon and pudding with mod-max assist/cues.  Recommend a dysphagia diet level 1 and honey thick liquids with 1:1 supervision/assist, sit at 90 degrees, liquids by spoon, verify/cue swallows, slow rate, crush meds and give with puree, hold po if aspiration signs noted.   Plan to continue swallow Tx to assess po tolerance and trial upgraded solids and liquids as indicated.  Will evaluate speech-language function as able.  Barriers to return to prior living situation: Communication deficits  Recommendations for discharge: ARU pending PT/OT needs, SLP eval and Tx  Rationale for recommendations: SLP services to maximize swallow function for a least restrictive diet and communication for daily needs       Entered by: Jeanette Bennett 07/17/2019 9:01 AM

## 2019-07-17 NOTE — PROGRESS NOTES
Portable EEG  [id number] done on cooperative,alert, awake,drwsy patient   Photic performed  Sruthi ventura

## 2019-07-17 NOTE — PLAN OF CARE
Pt here with new onset of expressive aphasia. A&O x4. Neuros - word finding difficulty, disoriented at times, and confusion, slow and deliberate finger to nose and heel to shin. VSS. Tele NSR w/BBB. NPO until speech consult. Up with A1-2 and GB, very unsteady on feet. Denies pain. Plan for neuro consult. Discharge pending.

## 2019-07-17 NOTE — PROVIDER NOTIFICATION
"MD Notification    Notified Person: MD    Notified Person Name: Dr. Baires    Notification Date/Time: 7/16/19 at 2216    Notification Interaction: web-based page    Purpose of Notification: \"740-1 - patient is DM2 with scheduled Lantus. NPO through night w/o fluids. OK to hold dose? HS    Chelita *[id number]    Orders Received:    Comments:    "

## 2019-07-17 NOTE — PROVIDER NOTIFICATION
"MD Notification    Notified Person: MD    Notified Person Name: Dr. Morley    Notification Date/Time: 7/16/19 at 2219    Notification Interaction: Web-based page    Purpose of Notification: \"740-1 - RF - Pt was admitted to observation, do you want to change to inpatient? Thanks!  *5700 VAN Merlos\"    Orders Received:    Comments:  "

## 2019-07-17 NOTE — PLAN OF CARE
"Discharge Planner OT   Patient plan for discharge: significant other wanting rehab     Current status: order received, chart reviewed, eval completed. Pt admitted under observation for expressive aphasia and confusion. At baseline, pt lives in house with significant other with 3 steps to enter and 7 steps to bed/bath. Per significant other, pt is Mod Ind with cane for most ADL's and transfers however pt does have \"transfer cart\" that he uses for longer distances. Significant other supervises bathing and A with other IADL's. Pt has macular degeneration at baseline.     Current status: CGA for bed mobility supine <> sit as pt impulsive. Pt demonstrates impaired cognition and requires multiple cues for command following, oriented to self and date, per SO this is not baseline and pt much more cognitively intact. Pt required min A for sit <> stand, significant posterior lean requiring A for standing balance. Min A for functional ambulation with use of cane, very narrow VIN, cues for balance/safety needed. Very poor balance noted requiring min A for correction. Pt required min A to don/doff socks.     Barriers to return to prior living situation: cognition, fall risk, not at baseline for mobility, poor balance     Recommendations for discharge: ARC     Rationale for recommendations: Pt would benefit from intensive OT services to improve IND with ADL's and transfers as pt was IND prior. Pt will be able to tolerate 3 hours of therapies. Pt motivated to participate in therapy and has good support at discharge. Pt would make good ARC candidate at this time.         Entered by: Linda Gonsales 07/17/2019 9:25 AM       "

## 2019-07-18 ENCOUNTER — APPOINTMENT (OUTPATIENT)
Dept: SPEECH THERAPY | Facility: CLINIC | Age: 84
End: 2019-07-18
Payer: MEDICARE

## 2019-07-18 ENCOUNTER — APPOINTMENT (OUTPATIENT)
Dept: OCCUPATIONAL THERAPY | Facility: CLINIC | Age: 84
End: 2019-07-18
Payer: MEDICARE

## 2019-07-18 LAB
GLUCOSE BLDC GLUCOMTR-MCNC: 121 MG/DL (ref 70–99)
GLUCOSE BLDC GLUCOMTR-MCNC: 157 MG/DL (ref 70–99)
GLUCOSE BLDC GLUCOMTR-MCNC: 177 MG/DL (ref 70–99)
GLUCOSE BLDC GLUCOMTR-MCNC: 214 MG/DL (ref 70–99)
GLUCOSE BLDC GLUCOMTR-MCNC: 221 MG/DL (ref 70–99)

## 2019-07-18 PROCEDURE — 96376 TX/PRO/DX INJ SAME DRUG ADON: CPT

## 2019-07-18 PROCEDURE — G0378 HOSPITAL OBSERVATION PER HR: HCPCS

## 2019-07-18 PROCEDURE — 96372 THER/PROPH/DIAG INJ SC/IM: CPT

## 2019-07-18 PROCEDURE — 25800030 ZZH RX IP 258 OP 636: Performed by: PHYSICIAN ASSISTANT

## 2019-07-18 PROCEDURE — 99225 ZZC SUBSEQUENT OBSERVATION CARE,LEVEL II: CPT | Performed by: HOSPITALIST

## 2019-07-18 PROCEDURE — 97530 THERAPEUTIC ACTIVITIES: CPT | Mod: GO

## 2019-07-18 PROCEDURE — 25000132 ZZH RX MED GY IP 250 OP 250 PS 637: Mod: GY | Performed by: HOSPITALIST

## 2019-07-18 PROCEDURE — 92526 ORAL FUNCTION THERAPY: CPT | Mod: GN

## 2019-07-18 PROCEDURE — 97535 SELF CARE MNGMENT TRAINING: CPT | Mod: GO,59

## 2019-07-18 PROCEDURE — 25000128 H RX IP 250 OP 636: Performed by: PHYSICIAN ASSISTANT

## 2019-07-18 PROCEDURE — 25000131 ZZH RX MED GY IP 250 OP 636 PS 637: Mod: GY | Performed by: HOSPITALIST

## 2019-07-18 PROCEDURE — 00000146 ZZHCL STATISTIC GLUCOSE BY METER IP

## 2019-07-18 RX ADMIN — ASPIRIN 81 MG: 81 TABLET, COATED ORAL at 09:10

## 2019-07-18 RX ADMIN — DULOXETINE HYDROCHLORIDE 20 MG: 20 CAPSULE, DELAYED RELEASE ORAL at 09:10

## 2019-07-18 RX ADMIN — METOPROLOL SUCCINATE 12.5 MG: 25 TABLET, EXTENDED RELEASE ORAL at 21:45

## 2019-07-18 RX ADMIN — ATORVASTATIN CALCIUM 40 MG: 40 TABLET, FILM COATED ORAL at 21:45

## 2019-07-18 RX ADMIN — INSULIN ASPART 2 UNITS: 100 INJECTION, SOLUTION INTRAVENOUS; SUBCUTANEOUS at 13:00

## 2019-07-18 RX ADMIN — LEVETIRACETAM 750 MG: 100 INJECTION, SOLUTION INTRAVENOUS at 21:42

## 2019-07-18 RX ADMIN — OMEPRAZOLE 40 MG: 20 CAPSULE, DELAYED RELEASE ORAL at 09:10

## 2019-07-18 RX ADMIN — CLOPIDOGREL BISULFATE 75 MG: 75 TABLET ORAL at 09:10

## 2019-07-18 RX ADMIN — INSULIN ASPART 1 UNITS: 100 INJECTION, SOLUTION INTRAVENOUS; SUBCUTANEOUS at 17:45

## 2019-07-18 RX ADMIN — LEVETIRACETAM 750 MG: 100 INJECTION, SOLUTION INTRAVENOUS at 09:09

## 2019-07-18 RX ADMIN — INSULIN GLARGINE 20 UNITS: 100 INJECTION, SOLUTION SUBCUTANEOUS at 21:47

## 2019-07-18 RX ADMIN — TAMSULOSIN HYDROCHLORIDE 0.4 MG: 0.4 CAPSULE ORAL at 21:45

## 2019-07-18 RX ADMIN — INSULIN ASPART 1 UNITS: 100 INJECTION, SOLUTION INTRAVENOUS; SUBCUTANEOUS at 09:09

## 2019-07-18 NOTE — PLAN OF CARE
Pt here with TIA vs SZR w/ chronic L ICA occlusion and R carotid stenosis CEA. A&O to self and place. Disoriented to situation and time, speech slow. [place]. Neuros includes baseline blurred vision from mac degeneration, slow and deliberate finger to nose/heel to shin. VSS on Ra. Tele SR. DD1 diet, Honey thick liquids. Takes pills crushed w/ pudding. Needs assistance. Up with 2/gb/cane/walker. Denies pain. IV saline lock. SZR prec, no activity noted. Discharge pending.

## 2019-07-18 NOTE — PLAN OF CARE
"PT: Patient seen by OT today and ambulated in streeter with FWW and CGA, performed stairs with SEC and CGA. Patient needing CGA as he tends to get \"distracted\" while ambulating and \"veer off\" per OT . OT currently recommending home with 24 hr supervision for all mobility, PT and OT therapies. OT to continue to assess PT needs. Will continue to follow.   "

## 2019-07-18 NOTE — PLAN OF CARE
BP to 97/48, OVSS.  MD notified of BP, hold any BP meds and continue to monitor.  Tele: BBB with prolonged QT.  Pt denies pain/nausea/neuropathy.  Neuros intact except for pt disoriented to situation and time.  Seizure precautions in place.  Pt is hard of hearing, slow speech with mild aphasia.  Pt has history of macular degeneration.   Pt is incontinent at times.  , 221, and 177, insulin administered per order parameters.   Pt on DD2, thin liquid diet, tolerating well.  Pt up with A1, GB and cane.  Mild aphasia noted.  Plan of care: Reassess safe discharge plan, have neuro assess Keppra dose, PT to see patient 7/19.

## 2019-07-18 NOTE — PLAN OF CARE
Discharge Planner OT   Patient plan for discharge: not yet decided  Current status: Per discussion with SW noted patient does not qualify for ARC. Therefore, writer to change discharge recommendation as noted below. As recommendations made and patient continues to be in hospital on observation status  inpatient OT will sign off and skilled OT intervention will be deferred to next level of care.   Barriers to return to prior living situation: currently requiring A of 1-2 for all functional transfers and self-cares, continues to be confused although improving  Recommendations for discharge: TCU (does not qualify for ARC)  Rationale for recommendations: pt needs daily therapies to advance safety and independence with mobilities and ADLs prior to return home. Currently he is requiring A of 1-2 and spouse not able to provide.        Entered by: Markus Franco 07/18/2019 11:58 AM

## 2019-07-18 NOTE — PROGRESS NOTES
Rehab Admissions:  OT is recommending Acute Rehab as pt is significant below functional baseline. Upon review of pt's needs, pt does not have a diagnosis impacted by intense therapy so does not meet criteria. SW aware of this. Thank you for the referral and please call with any questions.     Determination of admission is based upon the patient's need for an intensive, interdisciplinary approach to rehabilitation, their ability to progress, their ability to tolerate intensive therapies, their need for daily physician supervision, their need for twenty four hour nursing assistance, and their ability and willingness to participate in such a program.    Daylin Chamberlain MS, OTR/L [phone]  Rehab Liaison/ Supervisor  [facility]  7/18/2019    11:52 AM

## 2019-07-18 NOTE — PROGRESS NOTES
[facility]  Hospitalist Progress Note   07/18/2019          Assessment and Plan:       Dustin Pearce is a 89-year-old male with medical history significant for diabetes, hypertension, dyslipidemia, anxiety, depression, prior TIA, recent admission for probable TIA, probable seizure, chronic systolic congestive heart failure, CAD, BPH, history of chronic left carotid artery occlusion admitted on 7/16/2019 for confusion and expressive aphasia.    Transient expressive aphasia and confusion ? TIA, ischemic stroke.   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   Seizure disorder  Chronic left ICA occlusion.   History of right carotid stenosis, status post CEA.   Patient does not remember why he is here and what happened.    Electrolytes, blood glucose within normal limits.  Troponin undetectable.  CT head no acute pathology.  note old left frontal infarct.    CT angiogram of head and neck noted with persistent occlusion of left internal carotid artery at its origin. Slight improvement in previously seen moderately narrowed left P2 posterior cerebral artery.    CT head with contrast showed no evidence for acute infarct or ischemia.    EKG reviewed by me shows sinus bradycardia, right bundle branch block.   MRI brain - no acute pathology   EEG is abnormal due to the presence of moderate generalized slowing of the background activities.  No interictal epileptiform activities or seizures were recorded.   Patient was lethargic on 7/17 [likely due to Ativan that he had received ] but much more awake and alert today.    Continue neurochecks per unit routine.  Discontinue telemetry monitoring.  Seizure precautions.  Continue Keppra 750 mg twice daily.  PTA Keppra 500 mg twice daily.  Neurology recommend continue PTA Plavix. neurology comanagement.  LDL 64, HDL 47.  Decrease PTA atorvastatin from 40 mg to 20 mg oral daily.  No need for transthoracic echocardiogram given recent work-up in April 2019.  [LVEF estimated at 45  to 50%.]  Speech therapy - dysphagia diet   OT eval -TCU/ARU for rehabilitation  PT eval pending   Social work assistance with transition of care.    History of systolic congestive heart failure.   Severe 3-vessel disease with history of MODESTA to proximal LAD and proximal left circumflex  Echocardiogram April 2019 with EF of 45 to 50%.   Currently does not look volume overloaded and is not on any PTA diuretics.    Continue PTA metoprolol, atorvastatin.  Continue PTA aspirin and Plavix.  Defer to PCP, cardiology regarding dual antiplatelet therapy.    Hypertension.    Continue PTA Toprol-XL.  PRN IV labetalol as needed.    Diabetes mellitus. Hemoglobin A1c 8.3.  PTA Basaglar 25 units, metformin.  Started on insulin Lantus 10 units at bedtime, increase to PTA 25 units with insulin sliding scale.  Goal hemoglobin A1c around 7.  Per patient's wife has had insulin Basaglar dose increased 1 month back.  Monitor blood sugars and will optimize insulin regimen.    Dyslipidemia.    Continue atorvastatin.     Benign prostatic hypertrophy.    Continue Flomax.     Gastroesophageal reflux disease.    Continue Prilosec.     Depression.    Continue Cymbalta.     Acute on chronic mild normocytic anemia with iron deficiency  Baseline hemoglobin between 11 to 12.  Presented with a hemoglobin of 12.1.  Anemia work up as outpatient       Physical Deconditioned from acute illness/chronic debility/age.  Patient is 89 years old with multiple medical problems, living at home with his wife.   PT eval pending. OT recommends acute rehab unit, TCU.    Goals of care  Pt full code at this time. Will need to revisit long-term goals of care given patient's age/multiple medical comorbid.    Orders Placed This Encounter      Dysphagia Diet Level 1 Pureed Honey Thickened Liquids (pre-thickened or use instant food thickener) (by spoon)      DVT Prophylaxis: SCD, ambulate.  Code Status: Full Code  Disposition: Expected discharge pending PT,   eval and safe discharge plan.    Patient, interdisciplinary team involved in care and agrees with plan.  Total time > 25 min.Discussed with patient, his wife by the bedside, bedside RN    Pat Garcia MD        Interval History:      Appears comfortable lying in bed.  Pleasant, not able to answer simple commands.  Denies any chest pain or shortness of breath.  No new weakness.  No incontinence of bowel or bladder.  No seizures noted.         Physical Exam:        Physical Exam   Temp:  [97.4  F (36.3  C)-98  F (36.7  C)] 97.5  F (36.4  C)  Heart Rate:  [57-72] 61  Resp:  [16] 16  BP: ()/(42-65) 97/48  SpO2:  [90 %-97 %] 90 %    Intake/Output Summary (Last 24 hours) at 7/17/2019 1403  Last data filed at 7/17/2019 1100  Gross per 24 hour   Intake 360 ml   Output 300 ml   Net 60 ml     Admission Weight: 64.3 kg (141 lb 12.1 oz)  Current Weight: 64.3 kg (141 lb 12.1 oz)    PHYSICAL EXAM  GENERAL: Patient is in no distress. speech appears normal and slow  HEENT:  Pupils equal  HEART: Regular rate and rhythm. S1S2. No murmurs  NEURO: Cranial nerves II-XII intact. Motor and sensory system in normal range  EXTREMITIES: No pedal edema. 2+ peripheral pulses.  SKIN: Warm, dry.   PSYCHIATRY Cooperative       Medications:          aspirin  81 mg Oral Daily     atorvastatin  40 mg Oral At Bedtime     clopidogrel  75 mg Oral Daily     DULoxetine  20 mg Oral Daily     insulin aspart  1-7 Units Subcutaneous TID AC     insulin aspart  1-5 Units Subcutaneous At Bedtime     insulin glargine  10 Units Subcutaneous At Bedtime     levETIRAcetam  750 mg Intravenous Q12H     metoprolol succinate ER  12.5 mg Oral At Bedtime     omeprazole  40 mg Oral QAM     tamsulosin  0.4 mg Oral At Bedtime     acetaminophen, acetaminophen, bisacodyl, glucose **OR** dextrose **OR** glucagon, labetalol, magnesium hydroxide, - MEDICATION INSTRUCTIONS -, melatonin, naloxone, nitroGLYcerin, ondansetron **OR** ondansetron, prochlorperazine **OR**  prochlorperazine **OR** prochlorperazine, senna-docusate **OR** senna-docusate         Data:      All new lab and imaging data was reviewed.

## 2019-07-18 NOTE — PLAN OF CARE
"Discharge Planner OT   Patient plan for discharge: not yet determined  Current status: OT re-instated to help determine pt's ability to manage safely in home setting as spouse declines TCU situation due to need for self-pay (pt is on observation status). OT assessed pt's mobility and her performed bed mob SBA, trialled amb with his cane and slightly unsteady therefore switched to walker (which pt has avail at home) and steadiness improved, OT continued to provide CGA for safety not only for balance but also as pt easily distracted, tends to \"veer off\" in various directions requiring both physical and verbal cues to attend back to task at hand. Pt has many stairs to manage at home. He completed 6 stairs in therapy gym with CGA using his cane, no LOB. Pt O to self, month, year but did not follow directives consistently. When given hypothetical home situations pt demonstrated poor safety awareness.   Barriers to return to prior living situation: home alone while spouse works, stairs, impaired balance and impaired cognition  Recommendations for discharge: home w/24o supervision and OP OT/PT to further address balance and home safety/cognitive deficits. Also assist on stairs, assist with meds.  If patient unable to procure rides to OP therapies, spouse works and he does not drive he is likely considered homebound and would also qualify for home health therapies.   Rationale for recommendations: pt appears unsafe to be home alone at this time and would benefit from continued therapies to advance safety and independence with mobility and ADLs.        Entered by: Markus Franco 07/18/2019 4:58 PM      "

## 2019-07-18 NOTE — PROGRESS NOTES
Care Transition Initial Assessment - HUBER     Met with: chart review  Active Problems:    Expressive aphasia       DATA  Lives With: significant other   Living Arrangements: house(town home)  Quality of Family Relationships: supportive, involved  Description of Support System: Supportive, Involved  Who is your support system?: Significant Other  Support Assessment: Adequate family and caregiver support.   Identified issues/concerns regarding health management: SW following for discharge needs  Quality of Family Relationships: supportive, involved  Transportation Anticipated: family or friend will provide    ASSESSMENT  Cognitive Status:  Oriented to self  Concerns to be addressed: patient is a 89 year old male who was admitted under observation for expressive aphasia. Prior to hospitalization, patient was living at home with his significant other. SO reports to therapy that patient is moderately independent. At this time patient is requiring an assist of 2. Therapy is recommending ARU at discharge. SW sent referral to ARU Liaison. If patient is not appropriate for ARU, SW will need to discuss private pay TCU.     ADDENDUM  I: HUBER received a call from ARU Liaison and was informed that patient does not have qualifying diagnosis for ARU. HUBER met with patient and significant other. SO states that she prefers patient return home with OP therapy. SW discussed TCU and explained that it would be private pay if they decide to discharge to TCU. SO would like SW to return back once PT assesses. SO feels patient will likely be close to baseline, but felt the ativan that was given contributed to him be so sleepy. HUBER will follow up.      PLAN  Financial costs for the patient includes   Patient given options and choices for discharge   Patient/family is agreeable to the plan?    Transportation/person available to transport on day of discharge  is  and have they been notified/set up   Patient Goals and Preferences: TBD  Patient  anticipates discharging to:  ARU? Vs TCU-Likely discharge home.    Alba Dimas, ROYA   [phone]

## 2019-07-18 NOTE — PLAN OF CARE
Discharge Planner SLP   Patient plan for discharge: Did not discuss  Current status: Pt assessed with thin liquids, puree and regular solids at bedside. Pt's wife reports improvement in cognition and alertness, however pt continues to appear somewhat confused during treatment session. Pt tolerated 6 oz of thin liquids with a throat clear x 1, otherwise no additional s/sx of aspiration. Intermittent coughing with puree suspect secondary to large bites and pt talking while eating. He was able to masticate crackers with an extra amount of time needed to fully chew and clear his oral cavity.     Recommend dysphagia diet 2 and thin liquids - no straws. Pt to be up to the chair during meals, take small bites/sips, alternate solids and liquids, and eat/drink slowly     Barriers to return to prior living situation: Below baseline, modified diet, cognition   Recommendations for discharge: TCU, possible home with assistance and OP as pt does not quality for ARU and TCU would be private pay   Rationale for recommendations: SLP at next level of care for management of dysphagia and cog/comm evaluation as warranted          Entered by: Willa Santillan 07/18/2019 12:28 PM

## 2019-07-19 VITALS
HEART RATE: 62 BPM | BODY MASS INDEX: 22.78 KG/M2 | RESPIRATION RATE: 16 BRPM | SYSTOLIC BLOOD PRESSURE: 109 MMHG | HEIGHT: 66 IN | OXYGEN SATURATION: 94 % | DIASTOLIC BLOOD PRESSURE: 53 MMHG | TEMPERATURE: 97.8 F | WEIGHT: 141.76 LBS

## 2019-07-19 LAB
GLUCOSE BLDC GLUCOMTR-MCNC: 136 MG/DL (ref 70–99)
GLUCOSE BLDC GLUCOMTR-MCNC: 148 MG/DL (ref 70–99)
GLUCOSE BLDC GLUCOMTR-MCNC: 276 MG/DL (ref 70–99)

## 2019-07-19 PROCEDURE — G0378 HOSPITAL OBSERVATION PER HR: HCPCS

## 2019-07-19 PROCEDURE — 25000132 ZZH RX MED GY IP 250 OP 250 PS 637: Mod: GY | Performed by: HOSPITALIST

## 2019-07-19 PROCEDURE — 40000894 ZZH STATISTIC OT IP EVAL DEFER

## 2019-07-19 PROCEDURE — 25000128 H RX IP 250 OP 636: Performed by: PHYSICIAN ASSISTANT

## 2019-07-19 PROCEDURE — 00000146 ZZHCL STATISTIC GLUCOSE BY METER IP

## 2019-07-19 PROCEDURE — 96376 TX/PRO/DX INJ SAME DRUG ADON: CPT

## 2019-07-19 PROCEDURE — 96372 THER/PROPH/DIAG INJ SC/IM: CPT

## 2019-07-19 PROCEDURE — 25800030 ZZH RX IP 258 OP 636: Performed by: PHYSICIAN ASSISTANT

## 2019-07-19 PROCEDURE — 99217 ZZC OBSERVATION CARE DISCHARGE: CPT | Performed by: HOSPITALIST

## 2019-07-19 RX ORDER — LEVETIRACETAM 750 MG/1
750 TABLET ORAL 2 TIMES DAILY
Status: DISCONTINUED | OUTPATIENT
Start: 2019-07-19 | End: 2019-07-19 | Stop reason: HOSPADM

## 2019-07-19 RX ORDER — LEVETIRACETAM 750 MG/1
750 TABLET ORAL 2 TIMES DAILY
Qty: 60 TABLET | Refills: 0 | Status: ON HOLD | OUTPATIENT
Start: 2019-07-19 | End: 2019-01-01

## 2019-07-19 RX ORDER — ATORVASTATIN CALCIUM 20 MG/1
40 TABLET, FILM COATED ORAL DAILY
Qty: 30 TABLET | Refills: 0 | Status: SHIPPED | OUTPATIENT
Start: 2019-07-19 | End: 2020-01-01

## 2019-07-19 RX ADMIN — LEVETIRACETAM 750 MG: 100 INJECTION, SOLUTION INTRAVENOUS at 10:24

## 2019-07-19 RX ADMIN — CLOPIDOGREL BISULFATE 75 MG: 75 TABLET ORAL at 08:29

## 2019-07-19 RX ADMIN — INSULIN ASPART 3 UNITS: 100 INJECTION, SOLUTION INTRAVENOUS; SUBCUTANEOUS at 12:21

## 2019-07-19 RX ADMIN — DULOXETINE HYDROCHLORIDE 20 MG: 20 CAPSULE, DELAYED RELEASE ORAL at 08:30

## 2019-07-19 RX ADMIN — ASPIRIN 81 MG: 81 TABLET, COATED ORAL at 08:30

## 2019-07-19 RX ADMIN — INSULIN ASPART 1 UNITS: 100 INJECTION, SOLUTION INTRAVENOUS; SUBCUTANEOUS at 08:24

## 2019-07-19 RX ADMIN — OMEPRAZOLE 40 MG: 20 CAPSULE, DELAYED RELEASE ORAL at 08:29

## 2019-07-19 NOTE — DISCHARGE SUMMARY
Discharge Summary  Hospitalist    Date of Admission:  7/16/2019  Date of Discharge:  7/19/2019  Discharging Provider: Pat Garcia MD    Primary Care Physician   Matt Morrissey  Primary Care Provider Phone Number: [phone]  Primary Care Provider Fax Number: [phone]    PRINCIPAL DIAGNOSIS  Transient expressive aphasia and confusion ? TIA , seizure  History of TIA and CVA in the past.     Past Medical History:   Diagnosis Date     Abdominal aortic aneurysm (H) 12/25/2016     Acute on chronic systolic congestive heart failure (H) 6/7/2018     Anemia 6/7/2018     Anemia due to blood loss, acute 6/13/2017     Aortic stenosis     mild     Benign essential hypertension 1/6/2017     Benign non-nodular prostatic hyperplasia with lower urinary tract symptoms 10/20/2016     CAD (coronary artery disease)     MI 1970     Carotid artery stenosis 10/20/2016    chronically occluded left internal carotid artery     Cerebrovascular accident (H) 10/20/2016     Cognitive impairment 6/7/2018     Coronary artery disease of native artery of native heart with stable angina pectoris (H) 10/20/2016    MI 1970     Depression, unspecified depression type 2/22/2018     Diabetes mellitus (H)      Diabetic ulcer of left foot associated with diabetes mellitus due to underlying condition (H) 6/12/2017     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     Gastro-oesophageal reflux disease      Gastroesophageal reflux disease without esophagitis 10/20/2016     Heart attack (H)      Hyperlipidemia      Hyperlipidemia, unspecified hyperlipidemia type 10/20/2016     Ischemic cardiomyopathy      Lumbar back pain 12/30/2016     Mild cognitive impairment 10/20/2016     Nephrolithiasis     RECURRENT     NSTEMI (non-ST elevated myocardial infarction) (H) 6/7/2018     Osteopenia      PAD (peripheral artery disease) (H)     peripheral angiogram 5/2017: The common iliac artery stenoses were stented and the superficial femoral artery  stenoses were angioplastied     Paroxysmal atrial fibrillation (H)      Peripheral artery disease (H)     peripheral angiogram 5/2017: The common iliac artery stenoses were stented and the superficial femoral artery stenoses were angioplastied     RBBB with left anterior fascicular block      Slow transit constipation 2/22/2018     Stroke of unknown etiology (H) 12/31/2017     Syncope      TIA (transient ischemic attack) 1/20/2017     Unspecified cerebral artery occlusion with cerebral infarction      UTI (urinary tract infection) due to Enterococcus 2/22/2018     Ventricular tachycardia (H)     nonsustained       History of Present Illness   Dustin Pearce is an 89 year old male who presented with aphasia     Hospital Course   Dustin Pearce is a 89-year-old male with medical history significant for diabetes, hypertension, dyslipidemia, anxiety, depression, prior TIA, recent admission for probable TIA, probable seizure, chronic systolic congestive heart failure, CAD, BPH, history of chronic left carotid artery occlusion admitted on 7/16/2019 for confusion and expressive aphasia.     Transient expressive aphasia and confusion ? TIA , seizure  History of TIA and CVA in the past.   Seizure disorder  Chronic left ICA occlusion.   History of right carotid stenosis, status post CEA.   Patient did not remember why he is here and what happened.    Electrolytes, blood glucose within normal limits.  Troponin undetectable.  CT head no acute pathology.  note old left frontal infarct.    CT angiogram of head and neck noted with persistent occlusion of left internal carotid artery at its origin. Slight improvement in previously seen moderately narrowed left P2 posterior cerebral artery.    CT head with contrast showed no evidence for acute infarct or ischemia.    EKG reviewed by me shows sinus bradycardia, right bundle branch block.   MRI brain - no acute pathology   Telemetry monitoring no acute events.  EEG is abnormal due to  the presence of moderate generalized slowing of the background activities.  No interictal epileptiform activities or seizures were recorded.   Patient was lethargic on 7/17 [likely due to Ativan that he had received ], mental status back to baseline.  Evaluated by neurology, recommended continue Plavix alone.  Increased dose of Keppra from 500 to 750 mg twice daily.  Follow-up with primary neurologist in clinic per schedule.  No need for Echo given recent work-up in April 2019.  [LVEF estimated at 45 to 50%.]  LDL 64, HDL 47.  Decrease PTA atorvastatin from 40 mg to 20 mg oral daily.  Speech therapy - dysphagia diet   OT eval -TCU/ARU/ 24 x7 supervision for rehabilitation   PT eval - defer to OT   Patient apparently declined with IRU, TCU private pay and family declined.  Discussed with patient's wife need for 24 into 7 supervision, offered home health services.  Patient wife declined home health.  Declined any rehab services.  We will follow-up with PCP, primary neurologist coming week. Outpt PT and OT referral. Requested social work assistance with transition of care prior to discharge.     History of systolic congestive heart failure.   Severe 3-vessel disease with history of MODESTA to proximal LAD and proximal left circumflex  Echocardiogram April 2019 with EF of 45 to 50%.   Currently does not look volume overloaded and is not on any PTA diuretics.    Continue PTA metoprolol, atorvastatin dose lowered   Continue PTA aspirin and Plavix.  Defer to PCP, cardiology regarding dual antiplatelet therapy.     Hypertension.    Continue PTA Toprol-XL.  PRN IV labetalol as needed.     Diabetes mellitus. Hemoglobin A1c 8.3.  PTA Basaglar 25 units, metformin.  Goal hemoglobin A1c around 7.  Per patient's wife has had insulin Basaglar dose increased 1 month back.  Monitor blood sugars and optimize insulin regimen as outpt.   Continue PTA Basaglar and metformin.     Dyslipidemia.    Continue atorvastatin at lower  dose.     Benign prostatic hypertrophy.    Continue Flomax.      Gastroesophageal reflux disease.    Continue Prilosec.      Depression.    Continue Cymbalta.      Acute on chronic mild normocytic anemia with iron deficiency  Baseline hemoglobin between 11 to 12.  Presented with a hemoglobin of 12.1.  Anemia work up as outpatient       Physical Deconditioned from acute illness/chronic debility/age.  Patient is 89 years old with multiple medical problems, living at home with his wife.   Rehab recommendations as above.     Goals of care  Pt full code at this time. Will need to revisit long-term goals of care given patient's age/multiple medical comorbid.    Pat Garcia MD    Pending Results   Unresulted Labs Ordered in the Past 30 Days of this Admission     No orders found from 6/16/2019 to 7/17/2019.             Physical Exam   Vitals:    07/16/19 1807 07/16/19 2000   Weight: 64.3 kg (141 lb 12.1 oz) 64.3 kg (141 lb 12.1 oz)     Vital Signs with Ranges  Temp:  [97.5  F (36.4  C)-98  F (36.7  C)] 97.7  F (36.5  C)  Heart Rate:  [63-70] 69  Resp:  [16] 16  BP: (113-128)/(52-58) 123/56  SpO2:  [93 %-96 %] 93 %  I/O last 3 completed shifts:  In: 360 [P.O.:360]  Out: 275 [Urine:275]  PHYSICAL EXAM  GENERAL: Patient is in no distress. speech appears normal and slow  HEENT:  Pupils equal  HEART: Regular rate and rhythm. S1S2. No murmurs  Lungs bilateral slightly decreased breath sounds, no wheezing or crackles.  Abdomen soft nontender bowel sounds are.  NEURO: Cranial nerves II-XII intact. Motor and sensory system in normal range  EXTREMITIES: No pedal edema. 2+ peripheral pulses.  SKIN: Warm, dry.   PSYCHIATRY Cooperative    )Consultations This Hospital Stay   NEUROLOGY IP CONSULT  PHYSICAL THERAPY ADULT IP CONSULT  OCCUPATIONAL THERAPY ADULT IP CONSULT  SPEECH LANGUAGE PATH ADULT IP CONSULT  SWALLOW EVAL SPEECH PATH AT BEDSIDE IP CONSULT  SMOKING CESSATION PROGRAM IP CONSULT  OCCUPATIONAL THERAPY ADULT IP  CONSULT  SOCIAL WORK IP CONSULT  SOCIAL WORK IP CONSULT    Time Spent on this Encounter   I, Pat Garcia, personally saw the patient today and spent greater than 30 minutes discharging this patient.  Plan of care discussed with patient, his wife by the bedside and  bedside RN.    Discharge Orders      Physical Therapy Referral      Occupational Therapy Referral      Reason for your hospital stay    You were admitted to the hospital with a aphasia that resolved, TIA / ? Seizure     Discharge Instructions    Seizure precautions.  Dysphagia diet.     Activity    Your activity upon discharge: activity as tolerated, needs 24 into 7 supervision.     Monitor and record    Monitor blood sugars at home at least once daily and review on provider visit for optimization of medical therapy.     Follow-up and recommended labs and tests     Follow up with primary care provider, Matt Morrissey, within 7 days for hospital follow- up.  Check TSH as outpt   Follow-up with primary neurologist per schedule.  Age-appropriate health maintenance on PCP visit.  Anemia work-up on PCP visit.  Discuss long-term goals of care on PCP visit.  Discussed with primary care provider, cardiology regarding dual antiplatelet therapy with aspirin and Plavix.     Diet    Follow this diet upon discharge: Orders Placed This Encounter      Combination Diet Dysphagia Diet Level 2: Mechan Altered; Thin Liquids (water, ice chips, juice, milk gelatin, ice cream, etc) (No straws)       Discharge Medications   Current Discharge Medication List      CONTINUE these medications which have CHANGED    Details   atorvastatin (LIPITOR) 20 MG tablet Take 2 tablets (40 mg) by mouth daily  Qty: 30 tablet, Refills: 0    Comments: Future refills by PCP Dr. Matt Morrissey with phone number [phone].  Associated Diagnoses: Expressive aphasia      levETIRAcetam (KEPPRA) 750 MG tablet Take 1 tablet (750 mg) by mouth 2 times daily  Qty: 60 tablet, Refills: 0     Comments: Future refills by PCP Dr. Matt Morrissey with phone number [phone].  Associated Diagnoses: Seizure disorder (H)         CONTINUE these medications which have NOT CHANGED    Details   AMITRIPTYLINE HCL PO Take 25 mg by mouth nightly as needed for sleep      aspirin EC 81 MG EC tablet Take 1 tablet (81 mg) by mouth daily  Qty: 30 tablet, Refills: 0    Associated Diagnoses: Transient cerebral ischemia, unspecified type      bisacodyl (DULCOLAX) 10 MG Suppository Place 10 mg rectally daily as needed for constipation      clopidogrel (PLAVIX) 75 MG tablet TAKE 1 TABLET BY MOUTH DAILY  Qty: 90 tablet, Refills: 1    Associated Diagnoses: Cough      DULoxetine (CYMBALTA) 30 MG EC capsule TAKE 1 CAPSULE(30 MG) BY MOUTH DAILY  Qty: 90 capsule, Refills: 3    Associated Diagnoses: Polyneuropathy associated with underlying disease (H)      insulin glargine (BASAGLAR KWIKPEN) 100 UNIT/ML pen Inject 26 Units Subcutaneous At Bedtime  Qty: 15 mL, Refills: 11    Associated Diagnoses: Type 2 diabetes mellitus with hyperglycemia, with long-term current use of insulin (H)      ipratropium (ATROVENT) 0.03 % nasal spray INHALE 1 SPRAY IN EACH NOSTRIL EVERY 12 HOURS AS NEEDED  Qty: 30 mL, Refills: 11    Associated Diagnoses: Runny nose      lisinopril (PRINIVIL/ZESTRIL) 2.5 MG tablet TAKE 1 TABLET(2.5 MG) BY MOUTH DAILY  Qty: 90 tablet, Refills: 0    Associated Diagnoses: Coronary artery disease involving native coronary artery of native heart without angina pectoris      MAGNESIUM-OXIDE 400 (241.3 Mg) MG tablet TAKE 1 TABLET BY MOUTH TWICE DAILY  Qty: 180 tablet, Refills: 0    Associated Diagnoses: Constipation, unspecified constipation type      metFORMIN (GLUCOPHAGE) 1000 MG tablet Take 1 tablet (1,000 mg) by mouth 2 times daily (with meals)  Qty: 180 tablet, Refills: 3    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 If symptoms persist 5 minutes after 1st dose call 911.  Qty: 25 tablet, Refills: 0    Associated Diagnoses: Coronary artery disease involving native heart, angina presence unspecified, unspecified vessel or lesion type      omeprazole (PRILOSEC) 40 MG DR capsule TAKE 1 CAPSULE(40 MG) BY MOUTH DAILY 30 TO 60 MINUTES BEFORE A MEAL  Qty: 90 capsule, Refills: 3    Associated Diagnoses: Other acute gastritis without hemorrhage      polyethylene glycol (MIRALAX/GLYCOLAX) Packet Take 17 g by mouth daily as needed for constipation      tamsulosin (FLOMAX) 0.4 MG capsule Take 0.4 mg by mouth At Bedtime      blood glucose monitoring (NO BRAND SPECIFIED) meter device kit Use to test blood sugar bid times daily or as directed.  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  Qty: 1 Act, Refills: 11    Associated Diagnoses: Type 2 diabetes mellitus with complication, with long-term current use of insulin (H)           Allergies   Allergies   Allergen Reactions     Oxycodone Other (See Comments)     \"TERRIBLE SWEATING\"     Sulfa Drugs      Tetracycline        Discharge Disposition   Discharged to home  Condition at discharge: Good    DATA  Most Recent 3 CBC's:  Recent Labs   Lab Test 07/16/19  1739 06/30/19  2345 04/08/19  1019   WBC 8.2 7.1 10.1   HGB 12.1* 12.4* " 12.7*   MCV 91 90 92    189 179      Most Recent 3 BMP's:  Recent Labs   Lab Test 07/16/19  1739 06/30/19  2345 04/08/19  1019    140 138   POTASSIUM 4.6 5.1 4.9   CHLORIDE 107 106 104   CO2 31 29 28   BUN 17 26 21   CR 0.75 0.85 0.86   ANIONGAP 4 5 6   ALLISON 9.2 9.1 9.0   * 168* 195*     Most Recent 2 LFT's:  Recent Labs   Lab Test 07/17/19  1548 06/01/18  0810   AST 17 15   ALT 18 14   ALKPHOS 83 67   BILITOTAL 0.5 0.6     Most Recent TSH, T4 and A1c Labs:  Recent Labs   Lab Test 07/01/19  0607  06/03/18  0520   TSH  --   --  4.45*   T4  --   --  0.98   A1C 8.3*   < >  --     < > = values in this interval not displayed.     Results for orders placed or performed during the hospital encounter of 07/16/19   CT Head w/o Contrast    Narrative    CT SCAN OF THE HEAD WITHOUT CONTRAST   7/16/2019 5:50 PM     HISTORY: Code stroke. Difficulty walking.    TECHNIQUE: Axial images of the head and coronal reformations without  IV contrast material. Radiation dose for this scan was reduced using  automated exposure control, adjustment of the mA and/or kV according  to patient size, or iterative reconstruction technique.    COMPARISON: 6/30/2019.    FINDINGS: Old left frontal infarct, moderate cerebral atrophy, patchy  white matter changes without mass effect are again noted. There is no  evidence for acute hemorrhage, acute infarct, mass effect, or skull  fracture. There is some artifact over the brainstem. Visualized  paranasal sinuses and mastoid air cells are clear.      Impression    IMPRESSION: Chronic changes. No evidence for intracranial hemorrhage  or any acute process.    RACHELLE STANFORD MD   CTA Head Neck with Contrast    Narrative    CTA HEAD AND NECK WITH CONTRAST 7/16/2019 5:54 PM     HISTORY: Code stroke. Difficulty walking.    TECHNIQUE: Axial images were obtained through the head and neck with  intravenous contrast. 70 mL of Isovue-370 was given. Multiplanar  reconstructions were performed. 3-D  reconstructions off a remote  workstation for CT angiography were also acquired. Carotid stenoses  were evaluated by comparing the caliber of the proximal internal  carotid artery to the caliber of the distal internal carotid artery.  Radiation dose for this scan was reduced using automated exposure  control, adjustment of the mA and/or kV according to patient size, or  iterative reconstruction technique.    COMPARISON: 6/30/2018.    FINDINGS:    Brachiocephalic vessels: There is extensive calcific plaque involving  the thoracic arch and proximal brachiocephalic vessels, but there is  no significant stenosis.    Right carotid system: Mild calcific plaque is seen in the carotid  bifurcation region. There is no evidence for stenosis or dissection.    Left carotid system: There is extensive plaque in the distal common  carotid artery and carotid bifurcation with complete occlusion of the  left internal carotid artery at its origin. This is unchanged from  prior exam. There is reconstitution via the left posterior  communicating artery and the anterior communicating artery.    Right vertebral artery: Normal.    Left vertebral artery: Normal.    [place]: The proximal anterior, middle, and posterior  cerebral arteries are widely patent. The left P2 segments shows  improved flow since the prior exam, although there is still some  mild-to-moderate narrowing of this vessel. There is no evidence for  any acute thromboembolism or aneurysm.    Other findings: Degenerative changes are seen in the spine.      Impression    IMPRESSION:  1. 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  2. Slight improvement in previously seen moderately narrowed left P2  posterior cerebral artery.  3. No evidence for any acute thromboembolism.    RACHELLE STANFORD MD   CT Head Perfusion w Contrast    Narrative    CT HEAD PERFUSION WITH CONTRAST 7/16/2019 6:01 PM     HISTORY: Difficulty walking.    TECHNIQUE: Axial  images were obtained through the brain with  intravenous contrast for CT brain perfusion imaging. 50 mL of  Omnipaque 350 was given. Radiation dose for this scan was reduced  using automated exposure control, adjustment of the mA and/or kV  according to patient size, or iterative reconstruction technique.    COMPARISON: 6/30/2019.    FINDINGS: Again seen is decreased cerebral blood flow and cerebral  blood volume in the area of known left frontal infarction. Perfusion  maps are otherwise symmetric. There is no evidence for any acute  infarct or any acute ischemia.      Impression    IMPRESSION: No change from prior cerebral perfusion imaging. No  evidence for any acute infarct or any acute ischemia.    RACHELLE STANFORD MD   MRI Brain w & w/o contrast    Narrative    EXAM: MR BRAIN W/O AND W CONTRAST  LOCATION: [facility]  DATE/TIME: 7/17/2019 12:53 AM    INDICATION: Neurologic deficit(s), subacute. Difficulty walking.  COMPARISON: Head CT 7/16/2019, perfusion CT 7/16/2019.  CONTRAST: 6 mL Gadavist.  TECHNIQUE: Routine multiplanar, multisequence head MRI without and with intravenous contrast.    FINDINGS:  INTRACRANIAL CONTENTS: No acute or subacute infarct. No mass, acute hemorrhage, or extraaxial fluid collections. Chronic mid and inferior left frontal cystic encephalomalacia with surrounding gliosis. No change. Generalized atrophy. Borderline   ventricular prominence. Extensive chronic small vessel vascular changes in the hemispheric white matter, basal ganglia, wade, and cerebellum. Tiny, chronic lacunar changes in the basal ganglia, left thalamus, and right cerebellum. Normal position of the   cerebellar tonsils. No pathologic enhancement.    SELLA: No significant abnormality accounting for technique.    BONES/SOFT TISSUES: No aggressive osseous lesion involving the calvarium, skull base, or visualized upper cervical spine. The major intracranial vascular flow voids are maintained. Cervical spine  degenerative changes with fusion across the C2 and C3   segments likely due to long-standing degenerative change. Patent central canal at the visualized upper C-spine.    ORBITS: No significant abnormality accounting for technique.     SINUSES/MASTOIDS: No significant paranasal sinus mucosal disease. No significant middle ear or mastoid effusion.       Impression    CONCLUSION:  1.  Negative for acute intracranial process.   2.  Chronic left frontal encephalomalacia.   3.  Atrophy and chronic vascular changes in the supra- and infratentorial compartments.

## 2019-07-19 NOTE — PLAN OF CARE
Patient discharged this afternoon, back to home. Spouse provided transportation. Discharge instructions were discussed with patient and spouse. Patient took his ordered medications with him.

## 2019-07-19 NOTE — PROGRESS NOTES
Spoke with patient and his S.O. Halina about scheduling hospital follow up appointments.  Halina declined scheduling at this time and she indicated that she will schedule follow up with his PCP Dr. Morrissey.  She states he already has an appointment scheduled with his Neurologist and has other medical appointments next week.

## 2019-07-19 NOTE — PROGRESS NOTES
Neurologist paged: Re: [phone] RF: Patient on Keppra 750 mg, ok to discharge per hospitalist. Do you want the Keppra dose to be the same at discharge? Thanks.   MD called back, ok to discharge with this Keppra dose.

## 2019-07-19 NOTE — PROGRESS NOTES
HUBER  I: SW was updated patient would be returning home today. Patient's SO is available 24 hours a day and able to assist. Patient was up walking with therapy today. They recommend 24 hours supervision, OP PT/OT.     P: SW will continue to follow and assist as needed.    Alba Dimas, ROYA   *[phone]

## 2019-07-19 NOTE — PLAN OF CARE
Alert, disoriented x1-2 (situation & time). Tele: SR w/ BBB. Up A1 with cane & GB. DD2, thin liquid diet, no straws. Denies pain, nausea. Neuros intact ex disoriented & slow speech at time. Pupils slugglish. No aphasia noted this shift. [place]. Seizure precautions in place. Voiding bathroom, Incontinent @times. BMx1. , coverage given. Plan of care: Reassess safe discharge plan, have neuro assess Keppra dose?, PT to saw patient 7/18. Continue to monitor

## 2019-07-19 NOTE — PLAN OF CARE
Discharge Planner PT   Patient plan for discharge: home  Current status: PT orders received, chart reviewed, discussed status with OT and medical team. Pt admitted under OBS status with aphasia, possible TIA. Plan is for discharge home today with 24 hour supervision and wife providing CGA for mobility and OPPT/OT.  Barriers to return to prior living situation: defer to OT  Recommendations for discharge: defer to OT  Rationale for recommendations: OPPT to address balance and progress mobility. No further skilled acute PT needs identified. Will sign off and defer to OPPT.       Entered by: Zohreh Anderson 07/19/2019 12:52 PM

## 2019-07-19 NOTE — PLAN OF CARE
Discharge Planner OT   Patient plan for discharge: -    Current status: new consult received. Per discussion with team, no medical/functional change from day prior. Pt still under observation status and will need OP therapy and 24/7 supervision at discharge. See note from day prior on functional status.     Barriers to return to prior living situation: from session prior- home alone while spouse works, stairs, impaired balance and impaired cognition    Recommendations for discharge: from session prior, home w/24o supervision and OP OT/PT to further address balance and home safety/cognitive deficits. Also assist on stairs, assist with meds.  If patient unable to procure rides to OP therapies, spouse works and he does not drive he is likely considered homebound and would also qualify for home health therapies.     Rationale for recommendations: deferring additional consult as pt's functional status has not changed and will still require OP therapies with 24/7 supervision from significant other.         Entered by: Linda Gonsales 07/19/2019 9:01 AM

## 2019-07-22 ENCOUNTER — PATIENT OUTREACH (OUTPATIENT)
Dept: CARE COORDINATION | Facility: CLINIC | Age: 84
End: 2019-07-22

## 2019-07-22 DIAGNOSIS — R47.01 EXPRESSIVE APHASIA: Primary | ICD-10-CM

## 2019-07-22 ASSESSMENT — ACTIVITIES OF DAILY LIVING (ADL): DEPENDENT_IADLS:: CLEANING;COOKING;LAUNDRY

## 2019-07-22 NOTE — LETTER
[facility] Home  Complex Care Plan  About Me:    Patient Name:  Dustin Pearce    YOB: 1928  Age:         89 year old   [place] MRN:    [id number] Telephone Information:  Home Phone [phone]   Mobile Not on file.       Address:  [address]  [facility] [address] Email address:  No e-mail address on record      Emergency Contact(s)    Name Relationship Lgl Grd Work Phone Home Phone Mobile Phone   1. DOLORES SMYTH Friend  [phone] none none           Primary language:  English     needed? No   [employer]:  [phone] op. 1  Other communication barriers: None  Preferred Method of Communication:  Mail  Current living arrangement: I live in a private home, I live in a private home with spouse  Mobility Status/ Medical Equipment: Independent w/Device    Health Maintenance  Health Maintenance Reviewed: Due/Overdue   Health Maintenance Due   Topic Date Due     HF ACTION PLAN  01/31/[birth year]28     ADVANCE CARE PLANNING  01/31/1928     EYE EXAM  01/31/1928     MEDICARE ANNUAL WELLNESS VISIT  01/31/1993     ZOSTER IMMUNIZATION (2 of 3) 12/23/2012     DIABETIC FOOT EXAM  07/02/2019       My Access Plan  Medical Emergency 911   Primary Clinic Line [facility] - [phone]   24 Hour Appointment Line [phone] or  [phone]) (toll-free)   24 Hour Nurse Line [phone] (toll-free)   Preferred Urgent Care [facility], [phone]   Preferred Hospital [facility]  [phone]   Preferred Pharmacy [id number] - [facility] [address] [facility] [address]     Behavioral Health Crisis Line The National Suicide Prevention Lifeline at 1-674.521.6400 or 911             My Care Team Members  Patient Care Team       Relationship Specialty Notifications Start End    Matt Morrissey MD PCP - General Internal Medicine  12/18/16     Phone: [phone] Pager:  [phone] Fax: [phone]        [address] [place] [address]    Matt Morrissey MD PCP - Internal Medicine Internal Medicine  11/4/16     Phone: [phone] Pager: [phone] Fax: [phone]        [address] [place] [address]    [facility] ([place]  6/9/18     Fax: [phone]          [address] [place] [address]    Care, [facility]  [facility] ([facility] ([place])  6/17/18     Phone: [phone]         Matt Morrissey MD Assigned PCP   6/17/18     Phone: [phone] Pager: [phone] Fax: [phone]        [address] [place] [address]    Farnaz Eddy RN Clinic Care Coordinator Primary Care -  Admissions 7/22/19     Phone: [phone] Fax: [phone]                My Care Plans  Self Management and Treatment Plan  Goals and (Comments)  Goals        General    #1 Monitoring (pt-stated)     Notes - Note created  7/23/2019 10:31 AM by Farnaz Eddy, RN    Goal Statement: I will test blood sugars twice a day and keep the reading below 150.  I will check blood pressure daily and call if consistently over 140/90  Measure of Success: Blood sugar and blood pressure in the normal range   Supportive Steps to Achieve:   I will continue daily home exercises and use cane and walker as needed for safety  I will concentrate on eating 3 healthy meals a day   Barriers: None  Strengths: Supportive friend that lives with him   Date to Achieve By: 8/23/2019  Patient expressed understanding of goal: Yes               Action Plans on File: None                      Advance Care Plans/Directives Type: None       My Medical and Care Information  Problem List   Patient Active Problem List   Diagnosis     Coronary artery disease of native artery of native heart with stable angina pectoris (H)     Hyperlipidemia, unspecified hyperlipidemia type     Benign non-nodular prostatic hyperplasia with lower  urinary tract symptoms     Gastroesophageal reflux disease without esophagitis     Cerebrovascular accident (H)     Carotid artery stenosis     Abdominal aortic aneurysm (H)     Lumbar back pain     Benign essential hypertension     TIA (transient ischemic attack)     Paroxysmal atrial fibrillation (H)     Ischemic cardiomyopathy     Aortic root dilatation (H)     Physical deconditioning     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)     Anemia due to blood loss, acute     Diarrhea, unspecified type     Nausea and vomiting, intractability of vomiting not specified, unspecified vomiting type     Acute pain of left shoulder     Balance problems     Generalized muscle weakness     Peripheral artery disease (H)     Aortic stenosis     RBBB with left anterior fascicular block     Stroke of unknown etiology (H)     Carotid stenosis     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)     History of TIA (transient ischemic attack) and stroke     UTI (urinary tract infection)     UTI (urinary tract infection) due to Enterococcus     Generalized weakness     Type 2 diabetes mellitus with complication, with long-term current use of insulin (H)     Atherosclerosis of coronary artery of native heart without angina pectoris, unspecified vessel or lesion type     Depression, unspecified depression type     Slow transit constipation     Severe sepsis (H)     Chest discomfort     NSTEMI (non-ST elevated myocardial infarction) (H)     Anemia     Cognitive impairment     Acute on chronic systolic congestive heart failure (H)     Benign prostatic hyperplasia without lower urinary tract symptoms     Other seizures (H)     Expressive aphasia      Current Medications and Allergies:  See printed Medication Report.    Care Coordination Start Date: 7/23/2019   Frequency of Care Coordination: 1-2 weeks    Form Last Updated: 07/23/2019

## 2019-07-22 NOTE — PROGRESS NOTES
Clinic Care Coordination Contact  [facility]/Voicemail    Referral Source: ED Follow-Up  ED visit 7/19/2019-  Transient expressive aphasia and confusion ? TIA , seizure  History of TIA and CVA in the past.   Seizure disorder  Chronic left ICA occlusion.   History of right carotid stenosis, status post CEA.   Clinical Data: Care Coordinator Outreach  Outreach attempted x 1.  Left message on voicemail with call back information and requested return call.  Plan:  Care Coordinator will try to reach patient again in 1-2 business days.  Farnaz Eddy RN, Care Coordinator   [facility] -Care Coordination  [facility] [place] [facility] [place] [facility]   [phone]

## 2019-07-23 NOTE — PROGRESS NOTES
Clinic Care Coordination Contact    Clinic Care Coordination Contact  OUTREACH    Referral Information:  Referral Source: ED Follow-Up    Primary Diagnosis: Neurological Disorders    Chief Complaint   Patient presents with     Clinic Care Coordination - Post Hospital     Clinic Care Coordination RN         Universal Utilization: ED visit 7/19/2019-  Transient expressive aphasia and confusion ? TIA , seizure  History of TIA and CVA in the past.   Seizure disorder  Chronic left ICA occlusion.   History of right carotid stenosis, status post CEA.   Clinic Utilization  Difficulty keeping appointments:: No  Compliance Concerns: No  No-Show Concerns: No  No PCP office visit in Past Year: No  Utilization    Last refreshed: 7/22/2019  3:39 PM:  Hospital Admissions 3           Last refreshed: 7/22/2019  3:39 PM:  ED Visits 0           Last refreshed: 7/22/2019  3:39 PM:  No Show Count (past year) 1              Current as of: 7/22/2019  3:39 PM              Clinical Concerns:  Current Medical Concerns:  CC spoke to Halina /friend  Consent to communicate form signed   Halina reports the patient lives with her and is back to his baseline  No more seizure activity   Blood sugars checked morning and evening and range 133-135 in the morning and the highest reading in the evening 160  PCP would like the reading to be below 150    Current Behavioral Concerns: Not discussed    Education Provided to patient: Monitor seizure activity   Pain  Pain (GOAL):: No  Health Maintenance Reviewed: Due/Overdue   Health Maintenance Due   Topic Date Due     HF ACTION PLAN  01/31/1928     ADVANCE CARE PLANNING  01/31/1928     EYE EXAM  01/31/1928     MEDICARE ANNUAL WELLNESS VISIT  01/31/1993     ZOSTER IMMUNIZATION (2 of 3) 12/23/2012     DIABETIC FOOT EXAM  07/02/2019       Clinical Pathway: None    Medication Management:  Reviewed      Functional Status:  Dependent ADLs:: Ambulation-cane, Ambulation-walker  Dependent IADLs:: Cleaning, Cooking,  Laundry  Bed or wheelchair confined:: No  Mobility Status: Independent w/Device    Living Situation:  Current living arrangement:: I live in a private home, I live in a private home with spouse  Type of residence:: Private home - no stairs    Diet/Exercise/Sleep:  Diet:: Diabetic diet  Inadequate nutrition (GOAL):: No  Food Insecurity: No  Tube Feeding: No  Exercise:: Yes  Days per week of moderate to strenuous exercise (like a brisk walk): 7  On average, minutes per day of exercise at this level: 10  How intense was your typical exercise? : Light (like stretching or slow walking)  Exercise Minutes per Week: 70  Inadequate activity/exercise (GOAL):: No  Significant changes in sleep pattern (GOAL): No    Transportation:  Transportation concerns (GOAL):: No  Transportation means:: Family, Regular car     Psychosocial:  Samaritan or spiritual beliefs that impact treatment:: No  Mental health DX:: Yes  Mental health DX how managed:: Medication  Mental health management concern (GOAL):: No  Informal Support system:: Significant other     Financial/Insurance:   Financial/Insurance concerns (GOAL):: No    Community Resources: None  Supplies used at home:: None  Equipment Currently Used at Home: cane, straight, shower chair, grab bar, tub/shower    Advance Care Plan/Directive  Advanced Care Plans/Directives on file:: No  Advanced Care Plan/Directive Status: Not Applicable    Referrals Placed: Senior Linkage Line     Goals:   Goals        General    #1 Monitoring (pt-stated)     Notes - Note created  7/23/2019 10:31 AM by Farnaz Eddy RN    Goal Statement: I will test blood sugars twice a day and keep the reading below 150.  I will check blood pressure daily and call if consistently over 140/90  Measure of Success: Blood sugar and blood pressure in the normal range   Supportive Steps to Achieve:   I will continue daily home exercises and use cane and walker as needed for safety  I will concentrate on eating 3 healthy  meals a day   Barriers: None  Strengths: Supportive friend that lives with him   Date to Achieve By: 8/23/2019  Patient expressed understanding of goal: Yes              Patient/Caregiver understanding: Halina expresses good understanding   Outreach Frequency: weekly  Future Appointments              In 3 days Matt Morrissey MD [facility] RASHIDA Olguin    In 3 weeks Rafa Samuel MD [facility]    In 1 month Matt Morrissey MD [facility] RASHIDA Olguin          Plan:  CC will continue daily home exercises ,check blood sugars twice a day and blood pressure daily      Patient will use cane, transfer chair  or walker as needed     CC mailed CC introduction letter,care plan Health Care Directive form and Lifeline information  Senior linkage line contact information given for future resources needed     CC will meet the patient face to face at PCP visit 7/26/2019    Farnaz Eddy RN, Care Coordinator   [facility] -Care Coordination  [facility] , [facility] [place] [facility]   [phone]

## 2019-07-26 ENCOUNTER — OFFICE VISIT (OUTPATIENT)
Dept: FAMILY MEDICINE | Facility: CLINIC | Age: 84
End: 2019-07-26
Payer: MEDICARE

## 2019-07-26 ENCOUNTER — PATIENT OUTREACH (OUTPATIENT)
Dept: CARE COORDINATION | Facility: CLINIC | Age: 84
End: 2019-07-26

## 2019-07-26 ENCOUNTER — TRANSFERRED RECORDS (OUTPATIENT)
Dept: HEALTH INFORMATION MANAGEMENT | Facility: CLINIC | Age: 84
End: 2019-07-26

## 2019-07-26 VITALS
SYSTOLIC BLOOD PRESSURE: 129 MMHG | HEART RATE: 61 BPM | DIASTOLIC BLOOD PRESSURE: 59 MMHG | BODY MASS INDEX: 26.79 KG/M2 | TEMPERATURE: 97.4 F | WEIGHT: 166 LBS | OXYGEN SATURATION: 93 %

## 2019-07-26 DIAGNOSIS — R47.9 SPEECH DISTURBANCE, UNSPECIFIED TYPE: Primary | ICD-10-CM

## 2019-07-26 DIAGNOSIS — E11.8 TYPE 2 DIABETES MELLITUS WITH COMPLICATION, WITH LONG-TERM CURRENT USE OF INSULIN (H): ICD-10-CM

## 2019-07-26 DIAGNOSIS — R47.01 EXPRESSIVE APHASIA: Primary | ICD-10-CM

## 2019-07-26 DIAGNOSIS — I25.118 CORONARY ARTERY DISEASE OF NATIVE ARTERY OF NATIVE HEART WITH STABLE ANGINA PECTORIS (H): ICD-10-CM

## 2019-07-26 DIAGNOSIS — Z79.4 TYPE 2 DIABETES MELLITUS WITH COMPLICATION, WITH LONG-TERM CURRENT USE OF INSULIN (H): ICD-10-CM

## 2019-07-26 DIAGNOSIS — G40.89 OTHER SEIZURES (H): ICD-10-CM

## 2019-07-26 DIAGNOSIS — G45.9 TIA (TRANSIENT ISCHEMIC ATTACK): ICD-10-CM

## 2019-07-26 DIAGNOSIS — I10 BENIGN ESSENTIAL HYPERTENSION: ICD-10-CM

## 2019-07-26 PROCEDURE — 99495 TRANSJ CARE MGMT MOD F2F 14D: CPT | Performed by: INTERNAL MEDICINE

## 2019-07-26 NOTE — PROGRESS NOTES
Subjective     Dustin Pearce is a 89 year old male who presents to clinic today for the following health issues:    HPI       Hospital Follow-up Visit:    Hospital/Nursing Home/IP Rehab Facility: [facility]  Date of Admission: 07/16/19  Date of Discharge: 07/19/19  Reason(s) for Admission: aphasia            Problems taking medications regularly:  None       Medication changes since discharge: None       Problems adhering to non-medication therapy:  None  Cris Silverio MA    Summary of hospitalization:  [facility] [place] discharge summary reviewed  Diagnostic Tests/Treatments reviewed.  Follow up needed: Dr. Padilla from neurology   Other Healthcare Providers Involved in Patient s Care:         None  Update since discharge: improved.     Post Discharge Medication Reconciliation: discharge medications reconciled and changed, per note/orders (see AVS).  Plan of care communicated with patient and family     Coding guidelines for this visit:  Type of Medical   Decision Making Face-to-Face Visit       within 7 Days of discharge Face-to-Face Visit        within 14 days of discharge   Moderate Complexity [id number]   High Complexity [id number]            Hospitalized for aphasia   Speech has returned to normal  Keppra dose was increased  Neurology appointment on Monday  SO thinks Keppra is making him too tired  AM fasting sugars have been well controlled     Patient Active Problem List   Diagnosis     Coronary artery disease of native artery of native heart with stable angina pectoris (H)     Hyperlipidemia, unspecified hyperlipidemia type     Benign non-nodular prostatic hyperplasia with lower urinary tract symptoms     Gastroesophageal reflux disease without esophagitis     Cerebrovascular accident (H)     Carotid artery stenosis     Abdominal aortic aneurysm (H)     Lumbar back pain     Benign essential hypertension     TIA (transient ischemic attack)     Paroxysmal atrial fibrillation (H)      Ischemic cardiomyopathy     Aortic root dilatation (H)     Physical deconditioning     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)     Anemia due to blood loss, acute     Diarrhea, unspecified type     Nausea and vomiting, intractability of vomiting not specified, unspecified vomiting type     Acute pain of left shoulder     Balance problems     Generalized muscle weakness     Peripheral artery disease (H)     Aortic stenosis     RBBB with left anterior fascicular block     Stroke of unknown etiology (H)     Carotid stenosis     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)     History of TIA (transient ischemic attack) and stroke     UTI (urinary tract infection)     UTI (urinary tract infection) due to Enterococcus     Generalized weakness     Type 2 diabetes mellitus with complication, with long-term current use of insulin (H)     Atherosclerosis of coronary artery of native heart without angina pectoris, unspecified vessel or lesion type     Depression, unspecified depression type     Slow transit constipation     Severe sepsis (H)     Chest discomfort     NSTEMI (non-ST elevated myocardial infarction) (H)     Anemia     Cognitive impairment     Acute on chronic systolic congestive heart failure (H)     Benign prostatic hyperplasia without lower urinary tract symptoms     Other seizures (H)     Expressive aphasia     Past Surgical History:   Procedure Laterality Date     AMPUTATE FOOT Left 6/4/2017    Procedure: AMPUTATE FOOT;  Sun Add#3: partial left foot amputation - Sagital Saw - Mini C-Arm;  Surgeon: Moe Kirk DPM;  Location:  OR     CHOLECYSTECTOMY       ENDARTERECTOMY CAROTID Right 1/3/2018    Procedure: ENDARTERECTOMY CAROTID;  RIGHT CAROTID ENDARTERECTOMY WITH EEG;  Surgeon: Roland Rodriguez MD;  Location:  OR     ESOPHAGOSCOPY, GASTROSCOPY, DUODENOSCOPY (EGD), COMBINED N/A 12/26/2016    Procedure: COMBINED ESOPHAGOSCOPY, GASTROSCOPY, DUODENOSCOPY  (EGD);  Surgeon: Nasim Louis MD;  Location:  GI     GENITOURINARY SURGERY      KIDNEY STONE REMOVAL X 4     HC UGI ENDOSCOPY W EUS N/A 2016    Procedure: COMBINED ENDOSCOPIC ULTRASOUND, ESOPHAGOSCOPY, GASTROSCOPY, DUODENOSCOPY (EGD);  Surgeon: Nasim Louis MD;  Location:  GI     HERNIA REPAIR       INCISION AND DRAINAGE FOOT, COMBINED Left 2017    Procedure: COMBINED INCISION AND DRAINAGE FOOT;  REVISIONAL LEFT FOOT INCISION AND DRAINAGE WITH POSSIBLE FLAP CLOSURE , ANTIBIOTIC SOLUTION ;  Surgeon: Nabil Ann DPM;  Location:  OR     LASER HOLMIUM LITHOTRIPSY URETER(S), INSERT STENT, COMBINED  3/2/2012    Procedure:COMBINED CYSTOSCOPY, URETEROSCOPY, LASER HOLMIUM LITHOTRIPSY URETER(S), INSERT STENT; CYSTOSCOPY, RIGHT RETROGRADES, RIGHT URETEROSCOPY, HOLMIUM LASER, RIGHT STENT PLACEMENT; Surgeon:ANJELICA LEÓN; Location: OR     ROTATOR CUFF REPAIR RT/LT         Social History     Tobacco Use     Smoking status: Former Smoker     Packs/day: 1.00     Years: 30.00     Pack years: 30.00     Types: Cigarettes     Last attempt to quit: 1999     Years since quittin.5     Smokeless tobacco: Never Used   Substance Use Topics     Alcohol use: Not Currently     Alcohol/week: 4.2 oz     Types: 7 Standard drinks or equivalent per week     Comment: 1 drink per biweekly     Family History   Problem Relation Age of Onset     Breast Cancer Mother      Heart Failure Father 81         Current Outpatient Medications   Medication Sig Dispense Refill     AMITRIPTYLINE HCL PO Take 25 mg by mouth nightly as needed for sleep       aspirin EC 81 MG EC tablet Take 1 tablet (81 mg) by mouth daily 30 tablet 0     atorvastatin (LIPITOR) 20 MG tablet Take 2 tablets (40 mg) by mouth daily 30 tablet 0     bisacodyl (DULCOLAX) 10 MG Suppository Place 10 mg rectally daily as needed for constipation       blood glucose monitoring (NO BRAND SPECIFIED) meter device kit Use to test blood sugar  "bid times daily or as directed. 1 kit 0     blood glucose monitoring (TRUE METRIX BLOOD GLUCOSE TEST) test strip USE TO TEST BLOOD SUGAR THREE TIMES DAILY OR AS DIRECTED 300 strip 1     clopidogrel (PLAVIX) 75 MG tablet TAKE 1 TABLET BY MOUTH DAILY 90 tablet 1     DULoxetine (CYMBALTA) 30 MG EC capsule TAKE 1 CAPSULE(30 MG) BY MOUTH DAILY 90 capsule 3     insulin glargine (BASAGLAR KWIKPEN) 100 UNIT/ML pen Inject 26 Units Subcutaneous At Bedtime 15 mL 11     ipratropium (ATROVENT) 0.03 % nasal spray INHALE 1 SPRAY IN EACH NOSTRIL EVERY 12 HOURS AS NEEDED 30 mL 11     levETIRAcetam (KEPPRA) 750 MG tablet Take 1 tablet (750 mg) by mouth 2 times daily 60 tablet 0     lisinopril (PRINIVIL/ZESTRIL) 2.5 MG tablet TAKE 1 TABLET(2.5 MG) BY MOUTH DAILY 90 tablet 0     MAGNESIUM-OXIDE 400 (241.3 Mg) MG tablet TAKE 1 TABLET BY MOUTH TWICE DAILY 180 tablet 0     metFORMIN (GLUCOPHAGE) 1000 MG tablet Take 1 tablet (1,000 mg) by mouth 2 times daily (with meals) 180 tablet 3     metoprolol succinate ER (TOPROL-XL) 25 MG 24 hr tablet Take 12.5 mg by mouth At Bedtime       nitroGLYcerin (NITROSTAT) 0.4 MG sublingual tablet For chest pain place 1 tablet under the tongue every 5 minutes for 3 doses. If symptoms persist 5 minutes after 1st dose call 911. 25 tablet 0     omeprazole (PRILOSEC) 40 MG DR capsule TAKE 1 CAPSULE(40 MG) BY MOUTH DAILY 30 TO 60 MINUTES BEFORE A MEAL 90 capsule 3     order for DME Equipment being ordered: True Matrix Blood Glucose meter. 1 Act 11     polyethylene glycol (MIRALAX/GLYCOLAX) Packet Take 17 g by mouth daily as needed for constipation       tamsulosin (FLOMAX) 0.4 MG capsule Take 0.4 mg by mouth At Bedtime       Allergies   Allergen Reactions     Oxycodone Other (See Comments)     \"TERRIBLE SWEATING\"     Sulfa Drugs      Tetracycline        Reviewed and updated as needed this visit by Provider         Review of Systems   ROS COMP: Constitutional, HEENT, cardiovascular, pulmonary, gi and gu systems " are negative, except as otherwise noted.      Objective    There were no vitals taken for this visit.  There is no height or weight on file to calculate BMI.  Physical Exam   GENERAL: chronically ill appearing, frail   RESP: lungs clear to auscultation - no rales, rhonchi or wheezes  CV: Heart with regular rate and rhythm. No obvious carotid bruits   ABDOMEN: soft, nontender, no hepatosplenomegaly, no masses and bowel sounds normal  MS: no gross musculoskeletal defects noted, no edema  NEURO: Alert and oriented to person, place and time. Cranial nerves 2-12 appear grossly intact.   Walks with cane.  Normal speech.  No focal weakness.    PSYCH: mentation appears normal, affect normal/bright    Diagnostic Test Results:  Results for orders placed or performed during the hospital encounter of 07/16/19   CT Head w/o Contrast    Narrative    CT SCAN OF THE HEAD WITHOUT CONTRAST   7/16/2019 5:50 PM     HISTORY: Code stroke. Difficulty walking.    TECHNIQUE: Axial images of the head and coronal reformations without  IV contrast material. Radiation dose for this scan was reduced using  automated exposure control, adjustment of the mA and/or kV according  to patient size, or iterative reconstruction technique.    COMPARISON: 6/30/2019.    FINDINGS: Old left frontal infarct, moderate cerebral atrophy, patchy  white matter changes without mass effect are again noted. There is no  evidence for acute hemorrhage, acute infarct, mass effect, or skull  fracture. There is some artifact over the brainstem. Visualized  paranasal sinuses and mastoid air cells are clear.      Impression    IMPRESSION: Chronic changes. No evidence for intracranial hemorrhage  or any acute process.    RACHELLE STANFORD MD   CTA Head Neck with Contrast    Narrative    CTA HEAD AND NECK WITH CONTRAST 7/16/2019 5:54 PM     HISTORY: Code stroke. Difficulty walking.    TECHNIQUE: Axial images were obtained through the head and neck with  intravenous contrast. 70 mL  of Isovue-370 was given. Multiplanar  reconstructions were performed. 3-D reconstructions off a remote  workstation for CT angiography were also acquired. Carotid stenoses  were evaluated by comparing the caliber of the proximal internal  carotid artery to the caliber of the distal internal carotid artery.  Radiation dose for this scan was reduced using automated exposure  control, adjustment of the mA and/or kV according to patient size, or  iterative reconstruction technique.    COMPARISON: 6/30/2018.    FINDINGS:    Brachiocephalic vessels: There is extensive calcific plaque involving  the thoracic arch and proximal brachiocephalic vessels, but there is  no significant stenosis.    Right carotid system: Mild calcific plaque is seen in the carotid  bifurcation region. There is no evidence for stenosis or dissection.    Left carotid system: There is extensive plaque in the distal common  carotid artery and carotid bifurcation with complete occlusion of the  left internal carotid artery at its origin. This is unchanged from  prior exam. There is reconstitution via the left posterior  communicating artery and the anterior communicating artery.    Right vertebral artery: Normal.    Left vertebral artery: Normal.    [place]: The proximal anterior, middle, and posterior  cerebral arteries are widely patent. The left P2 segments shows  improved flow since the prior exam, although there is still some  mild-to-moderate narrowing of this vessel. There is no evidence for  any acute thromboembolism or aneurysm.    Other findings: Degenerative changes are seen in the spine.      Impression    IMPRESSION:  1. 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  2. Slight improvement in previously seen moderately narrowed left P2  posterior cerebral artery.  3. No evidence for any acute thromboembolism.    RACHELLE STANFORD MD   CT Head Perfusion w Contrast    Narrative    CT HEAD PERFUSION WITH CONTRAST  7/16/2019 6:01 PM     HISTORY: Difficulty walking.    TECHNIQUE: Axial images were obtained through the brain with  intravenous contrast for CT brain perfusion imaging. 50 mL of  Omnipaque 350 was given. Radiation dose for this scan was reduced  using automated exposure control, adjustment of the mA and/or kV  according to patient size, or iterative reconstruction technique.    COMPARISON: 6/30/2019.    FINDINGS: Again seen is decreased cerebral blood flow and cerebral  blood volume in the area of known left frontal infarction. Perfusion  maps are otherwise symmetric. There is no evidence for any acute  infarct or any acute ischemia.      Impression    IMPRESSION: No change from prior cerebral perfusion imaging. No  evidence for any acute infarct or any acute ischemia.    RACHELLE STANFORD MD   MRI Brain w & w/o contrast    Narrative    EXAM: MR BRAIN W/O AND W CONTRAST  LOCATION: [facility]  DATE/TIME: 7/17/2019 12:53 AM    INDICATION: Neurologic deficit(s), subacute. Difficulty walking.  COMPARISON: Head CT 7/16/2019, perfusion CT 7/16/2019.  CONTRAST: 6 mL Gadavist.  TECHNIQUE: Routine multiplanar, multisequence head MRI without and with intravenous contrast.    FINDINGS:  INTRACRANIAL CONTENTS: No acute or subacute infarct. No mass, acute hemorrhage, or extraaxial fluid collections. Chronic mid and inferior left frontal cystic encephalomalacia with surrounding gliosis. No change. Generalized atrophy. Borderline   ventricular prominence. Extensive chronic small vessel vascular changes in the hemispheric white matter, basal ganglia, wade, and cerebellum. Tiny, chronic lacunar changes in the basal ganglia, left thalamus, and right cerebellum. Normal position of the   cerebellar tonsils. No pathologic enhancement.    SELLA: No significant abnormality accounting for technique.    BONES/SOFT TISSUES: No aggressive osseous lesion involving the calvarium, skull base, or visualized upper cervical spine. The  major intracranial vascular flow voids are maintained. Cervical spine degenerative changes with fusion across the C2 and C3   segments likely due to long-standing degenerative change. Patent central canal at the visualized upper C-spine.    ORBITS: No significant abnormality accounting for technique.     SINUSES/MASTOIDS: No significant paranasal sinus mucosal disease. No significant middle ear or mastoid effusion.       Impression    CONCLUSION:  1.  Negative for acute intracranial process.   2.  Chronic left frontal encephalomalacia.   3.  Atrophy and chronic vascular changes in the supra- and infratentorial compartments.     Basic metabolic panel   Result Value Ref Range    Sodium 142 133 - 144 mmol/L    Potassium 4.6 3.4 - 5.3 mmol/L    Chloride 107 94 - 109 mmol/L    Carbon Dioxide 31 20 - 32 mmol/L    Anion Gap 4 3 - 14 mmol/L    Glucose 131 (H) 70 - 99 mg/dL    Urea Nitrogen 17 7 - 30 mg/dL    Creatinine 0.75 0.66 - 1.25 mg/dL    GFR Estimate 80 >60 mL/min/[1.73_m2]    GFR Estimate If Black >90 >60 mL/min/[1.73_m2]    Calcium 9.2 8.5 - 10.1 mg/dL   CBC with platelets differential   Result Value Ref Range    WBC 8.2 4.0 - 11.0 10e9/L    RBC Count 4.28 (L) 4.4 - 5.9 10e12/L    Hemoglobin 12.1 (L) 13.3 - 17.7 g/dL    Hematocrit 38.9 (L) 40.0 - 53.0 %    MCV 91 78 - 100 fl    MCH 28.3 26.5 - 33.0 pg    MCHC 31.1 (L) 31.5 - 36.5 g/dL    RDW 14.3 10.0 - 15.0 %    Platelet Count 170 150 - 450 10e9/L    Diff Method Automated Method     % Neutrophils 52.8 %    % Lymphocytes 33.0 %    % Monocytes 11.5 %    % Eosinophils 2.3 %    % Basophils 0.2 %    % Immature Granulocytes 0.2 %    Absolute Neutrophil 4.3 1.6 - 8.3 10e9/L    Absolute Lymphocytes 2.7 0.8 - 5.3 10e9/L    Absolute Monocytes 0.9 0.0 - 1.3 10e9/L    Absolute Eosinophils 0.2 0.0 - 0.7 10e9/L    Absolute Basophils 0.0 0.0 - 0.2 10e9/L    Abs Immature Granulocytes 0.0 0 - 0.4 10e9/L   INR   Result Value Ref Range    INR 0.98 0.86 - 1.14   Partial  thromboplastin time   Result Value Ref Range    PTT 30 22 - 37 sec   Keppra (Levetiracetam) Level   Result Value Ref Range    Keppra (Levetiracetam) Level 24 12 - 46 ug/mL   Glucose by meter   Result Value Ref Range    Glucose 109 (H) 70 - 99 mg/dL   Glucose by meter   Result Value Ref Range    Glucose 113 (H) 70 - 99 mg/dL   Glucose by meter   Result Value Ref Range    Glucose 111 (H) 70 - 99 mg/dL   Glucose by meter   Result Value Ref Range    Glucose 136 (H) 70 - 99 mg/dL   Glucose by meter   Result Value Ref Range    Glucose 126 (H) 70 - 99 mg/dL   Hepatic panel   Result Value Ref Range    Bilirubin Direct 0.1 0.0 - 0.2 mg/dL    Bilirubin Total 0.5 0.2 - 1.3 mg/dL    Albumin 3.2 (L) 3.4 - 5.0 g/dL    Protein Total 6.5 (L) 6.8 - 8.8 g/dL    Alkaline Phosphatase 83 40 - 150 U/L    ALT 18 0 - 70 U/L    AST 17 0 - 45 U/L   Glucose by meter   Result Value Ref Range    Glucose 180 (H) 70 - 99 mg/dL   Glucose by meter   Result Value Ref Range    Glucose 143 (H) 70 - 99 mg/dL   Glucose by meter   Result Value Ref Range    Glucose 250 (H) 70 - 99 mg/dL   Glucose by meter   Result Value Ref Range    Glucose 121 (H) 70 - 99 mg/dL   Glucose by meter   Result Value Ref Range    Glucose 157 (H) 70 - 99 mg/dL   Glucose by meter   Result Value Ref Range    Glucose 221 (H) 70 - 99 mg/dL   Glucose by meter   Result Value Ref Range    Glucose 177 (H) 70 - 99 mg/dL   Glucose by meter   Result Value Ref Range    Glucose 214 (H) 70 - 99 mg/dL   Glucose by meter   Result Value Ref Range    Glucose 136 (H) 70 - 99 mg/dL   Glucose by meter   Result Value Ref Range    Glucose 148 (H) 70 - 99 mg/dL   Glucose by meter   Result Value Ref Range    Glucose 276 (H) 70 - 99 mg/dL   EKG 12 lead   Result Value Ref Range    Interpretation ECG Click View Image link to view waveform and result            Assessment & Plan     1. Speech disturbance, unspecified type  Etiology of spells remains unclear; I brought of the loop recorded as recommended by  Dr. Ware again, they do not want to pursue that       2. Other seizures (H)  Spells could be seizures  Keppra dose was increased  SO wishes to discuss reducing dose due to lethargy side effects     3. TIA (transient ischemic attack)  Spells could be from TIAs?  Blood pressure well controlled, on dual antiplatelet therapy, lipids well controlled, working on diabetes control, no new carotid disease noted     4. Type 2 diabetes mellitus with complication, with long-term current use of insulin (H)  Continue increased dose of Lantus insulin, check A1c in October as previously planned     5. Coronary artery disease of native artery of native heart with stable angina pectoris (H)  Has been on dual antiplatelet therapy for longer than needed for stent, but because there is concern about possible TIAs, I would elect to continue Plavix for now     6. Benign essential hypertension  Blood pressure well controlled              No follow-ups on file.    Matt Morrissey MD  [facility]

## 2019-07-26 NOTE — PROGRESS NOTES
Clinic Care Coordination Contact    Follow Up Progress Note      Assessment:  CC met with the patient and wife face to face before PCP visit today.  Patient presents with a cane and very pleasant.  Wife reports blood sugars 129-123-121 and blood pressure consistently below 140/90   Goals addressed this encounter:   Goals Addressed                 This Visit's Progress      #1 Monitoring (pt-stated)   50%     Goal Statement: I will test blood sugars twice a day and keep the reading below 150.  I will check blood pressure daily and call if consistently over 140/90  Measure of Success: Blood sugar and blood pressure in the normal range   Supportive Steps to Achieve:   I will continue daily home exercises and use cane and walker as needed for safety  I will concentrate on eating 3 healthy meals a day   Barriers: None  Strengths: Supportive friend that lives with him   Date to Achieve By: 8/23/2019  Patient expressed understanding of goal: Yes               Intervention/Education provided during outreach: stressed the importance of checking blood sugar and blood pressure daily.       Outreach Frequency: weekly    Plan:   Patient and wife agree to continue to check the blood pressure and blood sugar daily   Care Coordinator will follow up in 7-10 business day   Farnaz Eddy RN, Care Coordinator   [facility] -Care Coordination  [facility] , [facility] and [facility]   [phone]

## 2019-07-29 ENCOUNTER — MEDICAL CORRESPONDENCE (OUTPATIENT)
Dept: HEALTH INFORMATION MANAGEMENT | Facility: CLINIC | Age: 84
End: 2019-07-29

## 2019-07-29 ENCOUNTER — TRANSFERRED RECORDS (OUTPATIENT)
Dept: HEALTH INFORMATION MANAGEMENT | Facility: CLINIC | Age: 84
End: 2019-07-29

## 2019-07-29 DIAGNOSIS — G45.9 TIA (TRANSIENT ISCHEMIC ATTACK): Primary | ICD-10-CM

## 2019-07-29 DIAGNOSIS — R68.89 SPELLS OF DECREASED ATTENTIVENESS: ICD-10-CM

## 2019-08-01 DIAGNOSIS — I65.29 CAROTID STENOSIS: ICD-10-CM

## 2019-08-01 DIAGNOSIS — I71.40 ABDOMINAL AORTIC ANEURYSM (AAA) WITHOUT RUPTURE (H): Primary | ICD-10-CM

## 2019-08-01 DIAGNOSIS — Z98.890 S/P CAROTID ENDARTERECTOMY: ICD-10-CM

## 2019-08-02 ENCOUNTER — PATIENT OUTREACH (OUTPATIENT)
Dept: CARE COORDINATION | Facility: CLINIC | Age: 84
End: 2019-08-02

## 2019-08-02 DIAGNOSIS — E11.8 TYPE 2 DIABETES MELLITUS WITH COMPLICATION, WITH LONG-TERM CURRENT USE OF INSULIN (H): Primary | ICD-10-CM

## 2019-08-02 DIAGNOSIS — Z79.4 TYPE 2 DIABETES MELLITUS WITH COMPLICATION, WITH LONG-TERM CURRENT USE OF INSULIN (H): Primary | ICD-10-CM

## 2019-08-02 ASSESSMENT — ACTIVITIES OF DAILY LIVING (ADL): DEPENDENT_IADLS:: CLEANING;COOKING;LAUNDRY

## 2019-08-02 NOTE — PROGRESS NOTES
Please leave for PCP    Called Halina and relayed message. She states Dr. Morrissey did not want blood sugar below 100. Reassured Halina blood sugar in the 90s is great but understood her concern, given PCP's instructions. Will route to PCP but advised we may not hear back until Monday. Advised if unable to raise blood sugar over the weekend to bring pt to ER but again reassured her a blood sugar in the 90s is excellent.    Thank you,  Brent MINAYA RN

## 2019-08-02 NOTE — PROGRESS NOTES
Clinic Care Coordination Contact  Incoming call from Halina/friend and she left a message on CC voicemail that the morning blood sugars have been below 100  Blood sugar readings have been 92-96-97  Halina is wondering if there should be any adjustment on the patients diabetes medication/insulin.    Patient continues Basaglar 26 units and Metformin 1000 mg twice a day.  Patient is eating 3 meals a day and no change in his routine   Please have care team call Halina back at [phone]    Farnaz Eddy RN, Care Coordinator   [facility] -Care Coordination  [facility] , [facility] and [facility]   [phone]

## 2019-08-04 DIAGNOSIS — I25.10 CORONARY ARTERY DISEASE INVOLVING NATIVE CORONARY ARTERY OF NATIVE HEART WITHOUT ANGINA PECTORIS: ICD-10-CM

## 2019-08-05 ENCOUNTER — HOSPITAL ENCOUNTER (OUTPATIENT)
Dept: CARDIOLOGY | Facility: CLINIC | Age: 84
Discharge: HOME | End: 2019-08-05
Attending: PHYSICIAN ASSISTANT | Admitting: PHYSICIAN ASSISTANT
Payer: MEDICARE

## 2019-08-05 DIAGNOSIS — R68.89 SPELLS OF DECREASED ATTENTIVENESS: ICD-10-CM

## 2019-08-05 DIAGNOSIS — G45.9 TIA (TRANSIENT ISCHEMIC ATTACK): ICD-10-CM

## 2019-08-05 PROCEDURE — 93308 TTE F-UP OR LMTD: CPT

## 2019-08-05 PROCEDURE — 93321 DOPPLER ECHO F-UP/LMTD STD: CPT | Mod: 26 | Performed by: INTERNAL MEDICINE

## 2019-08-05 PROCEDURE — 93325 DOPPLER ECHO COLOR FLOW MAPG: CPT | Mod: 26 | Performed by: INTERNAL MEDICINE

## 2019-08-05 PROCEDURE — 93308 TTE F-UP OR LMTD: CPT | Mod: 26 | Performed by: INTERNAL MEDICINE

## 2019-08-05 NOTE — TELEPHONE ENCOUNTER
"Pending Prescriptions:                       Disp   Refills    lisinopril (PRINIVIL/ZESTRIL) 2.5 MG tabl*90 tab*0            Sig: TAKE 1 TABLET(2.5 MG) BY MOUTH DAILY    Last Written Prescription Date:  05/08/2018  Last Fill Quantity: 90,  # refills: 0   Last office visit: 7/26/2019 with prescribing provider:     Future Office Visit:   Next 5 appointments (look out 90 days)    Aug 13, 2019 10:15 AM CDT  Return Visit with Rafa Samuel MD  [facility] ([facility]) [id number]  [facility] [address]  [phone] OPT 2   Aug 29, 2019  6:30 PM CDT  Office Visit with Matt Morrissey MD  [facility] [address] [place] [id number]  [phone]         Requested Prescriptions   Pending Prescriptions Disp Refills     lisinopril (PRINIVIL/ZESTRIL) 2.5 MG tablet [Pharmacy Med Name: LISINOPRIL 2.5MG TABLETS] 90 tablet 0     Sig: TAKE 1 TABLET(2.5 MG) BY MOUTH DAILY       ACE Inhibitors (Including Combos) Protocol Passed - 8/4/2019  3:13 AM        Passed - Blood pressure under 140/90 in past 12 months     BP Readings from Last 3 Encounters:   07/26/19 129/59   07/19/19 109/53   07/05/19 136/59                 Passed - Recent (12 mo) or future (30 days) visit within the authorizing provider's specialty     Patient had office visit in the last 12 months or has a visit in the next 30 days with authorizing provider or within the authorizing provider's specialty.  See \"Patient Info\" tab in inbasket, or \"Choose Columns\" in Meds & Orders section of the refill encounter.              Passed - Medication is active on med list        Passed - Patient is age 18 or older        Passed - Normal serum creatinine on file in past 12 months     Recent Labs   Lab Test 07/16/19  1739  12/25/16  0051   CR 0.75   < >  --    CREAT  --   --  0.7    < > = values in this interval not displayed.             Passed - Normal serum " potassium on file in past 12 months     Recent Labs   Lab Test 07/16/19  1731   POTASSIUM 4.6

## 2019-08-06 RX ORDER — LISINOPRIL 2.5 MG/1
TABLET ORAL
Qty: 90 TABLET | Refills: 1 | Status: ON HOLD | OUTPATIENT
Start: 2019-08-06 | End: 2019-01-01

## 2019-08-06 NOTE — TELEPHONE ENCOUNTER
Prescription approved per [facility] Refill Protocol.    Kirti Wade RN

## 2019-08-13 NOTE — PROGRESS NOTES
HPI and Plan:   See dictation(#[id number])    Orders Placed This Encounter   Procedures     Follow-Up with Cardiologist       No orders of the defined types were placed in this encounter.      There are no discontinued medications.      Encounter Diagnoses   Name Primary?     Coronary artery disease of native artery of native heart with stable angina pectoris (H)      Cardiomyopathy, unspecified type (H)      Paroxysmal atrial fibrillation (H)        CURRENT MEDICATIONS:  Current Outpatient Medications   Medication Sig Dispense Refill     AMITRIPTYLINE HCL PO Take 25 mg by mouth nightly as needed for sleep       aspirin EC 81 MG EC tablet Take 1 tablet (81 mg) by mouth daily 30 tablet 0     atorvastatin (LIPITOR) 20 MG tablet Take 2 tablets (40 mg) by mouth daily 30 tablet 0     bisacodyl (DULCOLAX) 10 MG Suppository Place 10 mg rectally daily as needed for constipation       blood glucose monitoring (NO BRAND SPECIFIED) meter device kit Use to test blood sugar bid times daily or as directed. 1 kit 0     blood glucose monitoring (TRUE METRIX BLOOD GLUCOSE TEST) test strip USE TO TEST BLOOD SUGAR THREE TIMES DAILY OR AS DIRECTED 300 strip 1     clopidogrel (PLAVIX) 75 MG tablet TAKE 1 TABLET BY MOUTH DAILY 90 tablet 1     DULoxetine (CYMBALTA) 30 MG EC capsule TAKE 1 CAPSULE(30 MG) BY MOUTH DAILY 90 capsule 3     insulin glargine (BASAGLAR KWIKPEN) 100 UNIT/ML pen Inject 26 Units Subcutaneous At Bedtime 15 mL 11     ipratropium (ATROVENT) 0.03 % nasal spray INHALE 1 SPRAY IN EACH NOSTRIL EVERY 12 HOURS AS NEEDED 30 mL 11     levETIRAcetam (KEPPRA) 750 MG tablet Take 1 tablet (750 mg) by mouth 2 times daily 60 tablet 0     lisinopril (PRINIVIL/ZESTRIL) 2.5 MG tablet TAKE 1 TABLET(2.5 MG) BY MOUTH DAILY 90 tablet 1     MAGNESIUM-OXIDE 400 (241.3 Mg) MG tablet TAKE 1 TABLET BY MOUTH TWICE DAILY 180 tablet 0     metFORMIN (GLUCOPHAGE) 1000 MG tablet Take 1 tablet (1,000 mg) by mouth 2 times daily (with meals) 180 tablet 3  "    metoprolol succinate ER (TOPROL-XL) 25 MG 24 hr tablet Take 12.5 mg by mouth At Bedtime       nitroGLYcerin (NITROSTAT) 0.4 MG sublingual tablet For chest pain place 1 tablet under the tongue every 5 minutes for 3 doses. If symptoms persist 5 minutes after 1st dose call 911. 25 tablet 0     omeprazole (PRILOSEC) 40 MG DR capsule TAKE 1 CAPSULE(40 MG) BY MOUTH DAILY 30 TO 60 MINUTES BEFORE A MEAL 90 capsule 3     order for DME Equipment being ordered: True Matrix Blood Glucose meter. 1 Act 11     polyethylene glycol (MIRALAX/GLYCOLAX) Packet Take 17 g by mouth daily as needed for constipation       tamsulosin (FLOMAX) 0.4 MG capsule Take 0.4 mg by mouth At Bedtime         ALLERGIES     Allergies   Allergen Reactions     Oxycodone Other (See Comments)     \"TERRIBLE SWEATING\"     Sulfa Drugs      Tetracycline        PAST MEDICAL HISTORY:  Past Medical History:   Diagnosis Date     Abdominal aortic aneurysm (H) 12/25/2016     Acute on chronic systolic congestive heart failure (H) 6/7/2018     Anemia 6/7/2018     Anemia due to blood loss, acute 6/13/2017     Aortic stenosis     mild     Benign essential hypertension 1/6/2017     Benign non-nodular prostatic hyperplasia with lower urinary tract symptoms 10/20/2016     CAD (coronary artery disease)     MI 1970     Carotid artery stenosis 10/20/2016    chronically occluded left internal carotid artery     Cerebrovascular accident (H) 10/20/2016     Cognitive impairment 6/7/2018     Coronary artery disease of native artery of native heart with stable angina pectoris (H) 10/20/2016    MI 1970     Depression, unspecified depression type 2/22/2018     Diabetes mellitus (H)      Diabetic ulcer of left foot associated with diabetes mellitus due to underlying condition (H) 6/12/2017     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     Gastro-oesophageal reflux disease      Gastroesophageal reflux disease without esophagitis 10/20/2016     Heart attack (H)      " Hyperlipidemia      Hyperlipidemia, unspecified hyperlipidemia type 10/20/2016     Ischemic cardiomyopathy      Lumbar back pain 12/30/2016     Mild cognitive impairment 10/20/2016     Nephrolithiasis     RECURRENT     NSTEMI (non-ST elevated myocardial infarction) (H) 6/7/2018     Osteopenia      PAD (peripheral artery disease) (H)     peripheral angiogram 5/2017: The common iliac artery stenoses were stented and the superficial femoral artery stenoses were angioplastied     Paroxysmal atrial fibrillation (H)      Peripheral artery disease (H)     peripheral angiogram 5/2017: The common iliac artery stenoses were stented and the superficial femoral artery stenoses were angioplastied     RBBB with left anterior fascicular block      Slow transit constipation 2/22/2018     Stroke of unknown etiology (H) 12/31/2017     Syncope      TIA (transient ischemic attack) 1/20/2017     Unspecified cerebral artery occlusion with cerebral infarction      UTI (urinary tract infection) due to Enterococcus 2/22/2018     Ventricular tachycardia (H)     nonsustained       PAST SURGICAL HISTORY:  Past Surgical History:   Procedure Laterality Date     AMPUTATE FOOT Left 6/4/2017    Procedure: AMPUTATE FOOT;  Sun Add#3: partial left foot amputation - Sagital Saw - Mini C-Arm;  Surgeon: Moe Kirk DPM;  Location:  OR     CHOLECYSTECTOMY       ENDARTERECTOMY CAROTID Right 1/3/2018    Procedure: ENDARTERECTOMY CAROTID;  RIGHT CAROTID ENDARTERECTOMY WITH EEG;  Surgeon: Roland Rodriguez MD;  Location:  OR     ESOPHAGOSCOPY, GASTROSCOPY, DUODENOSCOPY (EGD), COMBINED N/A 12/26/2016    Procedure: COMBINED ESOPHAGOSCOPY, GASTROSCOPY, DUODENOSCOPY (EGD);  Surgeon: Nasim Louis MD;  Location:  GI     GENITOURINARY SURGERY      KIDNEY STONE REMOVAL X 4     HC UGI ENDOSCOPY W EUS N/A 12/29/2016    Procedure: COMBINED ENDOSCOPIC ULTRASOUND, ESOPHAGOSCOPY, GASTROSCOPY, DUODENOSCOPY (EGD);  Surgeon: Nasim Louis  MD Milagros;  Location:  GI     HERNIA REPAIR       INCISION AND DRAINAGE FOOT, COMBINED Left 2017    Procedure: COMBINED INCISION AND DRAINAGE FOOT;  REVISIONAL LEFT FOOT INCISION AND DRAINAGE WITH POSSIBLE FLAP CLOSURE , ANTIBIOTIC SOLUTION ;  Surgeon: Nabil Ann DPM;  Location:  OR     LASER HOLMIUM LITHOTRIPSY URETER(S), INSERT STENT, COMBINED  3/2/2012    Procedure:COMBINED CYSTOSCOPY, URETEROSCOPY, LASER HOLMIUM LITHOTRIPSY URETER(S), INSERT STENT; CYSTOSCOPY, RIGHT RETROGRADES, RIGHT URETEROSCOPY, HOLMIUM LASER, RIGHT STENT PLACEMENT; Surgeon:ANJELICA LEÓN; Location: OR     ROTATOR CUFF REPAIR RT/LT         FAMILY HISTORY:  Family History   Problem Relation Age of Onset     Breast Cancer Mother      Heart Failure Father 81       SOCIAL HISTORY:  Social History     Socioeconomic History     Marital status:      Spouse name: None     Number of children: None     Years of education: None     Highest education level: None   Occupational History     None   Social Needs     Financial resource strain: None     Food insecurity:     Worry: None     Inability: None     Transportation needs:     Medical: None     Non-medical: None   Tobacco Use     Smoking status: Former Smoker     Packs/day: 1.00     Years: 30.00     Pack years: 30.00     Types: Cigarettes     Start date:      Last attempt to quit: 1999     Years since quittin.6     Smokeless tobacco: Never Used   Substance and Sexual Activity     Alcohol use: Not Currently     Alcohol/week: 4.2 oz     Types: 7 Standard drinks or equivalent per week     Comment: 1 drink per biweekly     Drug use: No     Sexual activity: Not Currently     Partners: Female   Lifestyle     Physical activity:     Days per week: None     Minutes per session: None     Stress: None   Relationships     Social connections:     Talks on phone: None     Gets together: None     Attends Taoism service: None     Active member of club or  "organization: None     Attends meetings of clubs or organizations: None     Relationship status: None     Intimate partner violence:     Fear of current or ex partner: None     Emotionally abused: None     Physically abused: None     Forced sexual activity: None   Other Topics Concern     Parent/sibling w/ CABG, MI or angioplasty before 65F 55M? Not Asked   Social History Narrative     None       Review of Systems:  Skin:  Positive for bruising basel cell on face   Eyes:  Positive for glasses    ENT:  Positive for hearing loss    Respiratory:  Positive for cough     Cardiovascular:  Negative;palpitations;chest pain;dizziness;syncope or near-syncope;cyanosis;edema exercise intolerance;Positive for;lightheadedness;fatigue    Gastroenterology: Positive for abdominal pain    Genitourinary:  Negative      Musculoskeletal:  Positive for muscular weakness;joint pain    Neurologic:  Positive for stroke    Psychiatric:  Positive for sleep disturbances    Heme/Lymph/Imm:  Negative      Endocrine:  Positive for diabetes      Physical Exam:  Vitals: /53 (BP Location: Left arm, Cuff Size: Adult Large)   Pulse 56   Ht 1.778 m (5' 10\")   Wt 75.7 kg (166 lb 12.8 oz)   BMI 23.93 kg/m      Constitutional:           Skin:             Head:           Eyes:           Lymph:      ENT:           Neck:           Respiratory:            Cardiac:                                                           GI:           Extremities and Muscular Skeletal:                 Neurological:           Psych:             CC  Catherine Laurent, APRN CNP  [facility]  [id number]  [place] [id number]                  "

## 2019-08-13 NOTE — LETTER
8/13/2019      Matt Morrissey MD  [address]  [facility] [address]      RE: Dustin Pearce       Dear Colleague,    I had the pleasure of seeing Dustin Pearce in the [facility].    Service Date: 08/13/2019      REASON FOR CARDIOLOGY VISIT:  Followup CAD.      HISTORY OF PRESENT ILLNESS:  Mr. Pearce is a very pleasant 89-year-old gentleman who is here coming for routine followup regarding CAD.  I saw the patient last time in 12/2017.  Significant changes have happened to his health since that time last year.  More than a year ago, the patient had a non-ST elevation myocardial infarction and underwent coronary angiogram that showed severe 3-vessel disease with chronically occluded RCA and severe proximal LAD and circumflex disease.  Both of these lesions in circumflex and LAD were successfully stented with a drug-eluting stent.  He also has ischemic cardiomyopathy with LVEF around 40%-45%, history of cerebrovascular accident with history of TIA with history of peripheral artery disease, status post right carotid endarterectomy, left carotid occlusion, history of left common iliac stenting and left SFA angioplasty and left axillary and subclavian stenosis.      More recently, patient had issues with what is described as spells.  He had 3 spells so far.  In fact, only with one of the spells, he had brief transient loss of consciousness and with other spells he appeared stiff, nonresponsive or having garbled speech.  In fact, he was seen by Dr. Ware from Electrophysiology and underwent ZIO Patch monitoring that showed 3 episodes of NSVT.  At that time an option of a loop recorder was discussed with the patient and his wife, but they both declined.        The patient was then admitted last month with another episode of spell, which was concerning for a TIA even possible seizure.  He was seen by Neurology.  His seizure medications were increased.        He had an echocardiogram done  about a week ago that showed LVEF of 40%-45%, negative bubble study, mild mitral regurgitation, mild mitral stenosis, mild aortic stenosis.  Overall, LVEF appears somewhat similar to what it was earlier this year, around 45%.        The patient today is in a wheelchair, but he is able to walk with a cane.  He can walk up to 2 blocks but then stops voluntarily.  He does not feel any chest discomfort or shortness of breath with physical activity.  He is on dual antiplatelet therapy and is tolerating it quite well without any issues.  He is on Lipitor 20 mg daily.  He is on metoprolol and lisinopril.  LDL is reasonably controlled at 64, blood pressure is well controlled.      PHYSICAL EXAMINATION:   VITAL SIGNS:  Blood pressure 109/53, heart rate 56, regular, weight 166 pounds, BMI 23.93.   GENERAL:  The patient appears pleasant, comfortable.   NECK:  Normal JVP, no bruit.  Right carotid endarterectomy scar seen.   CARDIOVASCULAR:  S1, S2 normal, no murmur, rub or gallop.   RESPIRATORY:  Clear to auscultation bilaterally.   GASTROINTESTINAL SYSTEM:  Abdomen  soft, nontender.   EXTREMITIES:  No pitting pedal edema.   NEUROLOGICAL:  Alert, oriented x3.   PSYCHIATRIC:  Normal affect.   SKIN:  No obvious rash.   HEENT:  No pallor or icterus.      IMPRESSION AND PLAN:  A pleasant 89-year-old gentleman with severe 3-vessel disease on coronary angiogram last year with occluded RCA with severe circumflex and LAD disease, both the LAD and circumflex disease, underwent successful drug-eluting stent PCIs.  He has ischemic cardiomyopathy with LVEF around 40%-45%.  Clinically, not having any anginal symptoms or congestive heart failure.  He is on appropriate CAD medication therapy of antiplatelet therapy, statin, beta blocker and ACE inhibitor.  LDL is well controlled.  Blood pressure is well controlled.        I had a long discussion with the patient regarding option of continuing dual antiplatelet therapy beyond 1 year of MI  versus single antiplatelet therapy.  We discussed pros and cons of each strategy.  After a long discussion, the patient decided to continue dual antiplatelet therapy, which I think is reasonable.  Going forward, if he has any adverse effect on dual antiplatelet therapy, I will have a low threshold to change it to mono antiplatelet therapy.  It looks like Neurology was also okay with mono antiplatelet therapy regarding stroke prophylaxis.      Regarding the spells, he has seen Dr. Ware.  A loop recorder was offered, which the decline and they also confirmed today that they do not want to pursue it.  I went in detail regarding the history of these 3 episodes, especially with his wife who is a very astute observer, and per discussion with the wife, these episodes sounds less likely to be arrhythmogenic and more likely concerning for TIA versus seizures as he only briefly lost consciousness with only 1 episode and on the other episode he appears stiff with garbled speech.  However, if there are more episodes of these spells or loss of consciousness, I recommend them to contact Dr. Ware's office for consideration of a loop recorder at that time.        Otherwise, if he is stable in terms of cardiac status, we can see him back in 1 year, sooner if he notes any change in clinical status, especially any exertional-related symptoms.         JUANITA LOPEZ MD             D: 2019   T: 2019   MT: ARINA      Name:     SID AGUIAR   MRN:      [id number]        Account:      [id number]   :      1928           Service Date: 2019      Document: [id number]         Outpatient Encounter Medications as of 2019   Medication Sig Dispense Refill     AMITRIPTYLINE HCL PO Take 25 mg by mouth nightly as needed for sleep       aspirin EC 81 MG EC tablet Take 1 tablet (81 mg) by mouth daily 30 tablet 0     atorvastatin (LIPITOR) 20 MG tablet Take 2 tablets (40 mg) by mouth daily 30 tablet 0     bisacodyl (DULCOLAX)  10 MG Suppository Place 10 mg rectally daily as needed for constipation       blood glucose monitoring (NO BRAND SPECIFIED) meter device kit Use to test blood sugar bid times daily or as directed. 1 kit 0     blood glucose monitoring (TRUE METRIX BLOOD GLUCOSE TEST) test strip USE TO TEST BLOOD SUGAR THREE TIMES DAILY OR AS DIRECTED 300 strip 1     clopidogrel (PLAVIX) 75 MG tablet TAKE 1 TABLET BY MOUTH DAILY 90 tablet 1     DULoxetine (CYMBALTA) 30 MG EC capsule TAKE 1 CAPSULE(30 MG) BY MOUTH DAILY 90 capsule 3     insulin glargine (BASAGLAR KWIKPEN) 100 UNIT/ML pen Inject 26 Units Subcutaneous At Bedtime 15 mL 11     ipratropium (ATROVENT) 0.03 % nasal spray INHALE 1 SPRAY IN EACH NOSTRIL EVERY 12 HOURS AS NEEDED 30 mL 11     levETIRAcetam (KEPPRA) 750 MG tablet Take 1 tablet (750 mg) by mouth 2 times daily 60 tablet 0     lisinopril (PRINIVIL/ZESTRIL) 2.5 MG tablet TAKE 1 TABLET(2.5 MG) BY MOUTH DAILY 90 tablet 1     metFORMIN (GLUCOPHAGE) 1000 MG tablet Take 1 tablet (1,000 mg) by mouth 2 times daily (with meals) 180 tablet 3     metoprolol succinate ER (TOPROL-XL) 25 MG 24 hr tablet Take 12.5 mg by mouth At Bedtime       nitroGLYcerin (NITROSTAT) 0.4 MG sublingual tablet For chest pain place 1 tablet under the tongue every 5 minutes for 3 doses. If symptoms persist 5 minutes after 1st dose call 911. 25 tablet 0     omeprazole (PRILOSEC) 40 MG DR capsule TAKE 1 CAPSULE(40 MG) BY MOUTH DAILY 30 TO 60 MINUTES BEFORE A MEAL 90 capsule 3     order for DME Equipment being ordered: True Matrix Blood Glucose meter. 1 Act 11     polyethylene glycol (MIRALAX/GLYCOLAX) Packet Take 17 g by mouth daily as needed for constipation       tamsulosin (FLOMAX) 0.4 MG capsule Take 0.4 mg by mouth At Bedtime       [DISCONTINUED] MAGNESIUM-OXIDE 400 (241.3 Mg) MG tablet TAKE 1 TABLET BY MOUTH TWICE DAILY 180 tablet 0     No facility-administered encounter medications on file as of 8/13/2019.        Again, thank you for allowing me  to participate in the care of your patient.      Sincerely,    Rafa Samuel MD     [facility]

## 2019-08-13 NOTE — PROGRESS NOTES
Service Date: 08/13/2019      REASON FOR CARDIOLOGY VISIT:  Followup CAD.      HISTORY OF PRESENT ILLNESS:  Mr. Pearce is a very pleasant 89-year-old gentleman who is here coming for routine followup regarding CAD.  I saw the patient last time in 12/2017.  Significant changes have happened to his health since that time last year.  More than a year ago, the patient had a non-ST elevation myocardial infarction and underwent coronary angiogram that showed severe 3-vessel disease with chronically occluded RCA and severe proximal LAD and circumflex disease.  Both of these lesions in circumflex and LAD were successfully stented with a drug-eluting stent.  He also has ischemic cardiomyopathy with LVEF around 40%-45%, history of cerebrovascular accident with history of TIA with history of peripheral artery disease, status post right carotid endarterectomy, left carotid occlusion, history of left common iliac stenting and left SFA angioplasty and left axillary and subclavian stenosis.      More recently, patient had issues with what is described as spells.  He had 3 spells so far.  In fact, only with one of the spells, he had brief transient loss of consciousness and with other spells he appeared stiff, nonresponsive or having garbled speech.  In fact, he was seen by Dr. Ware from Electrophysiology and underwent ZIO Patch monitoring that showed 3 episodes of NSVT.  At that time an option of a loop recorder was discussed with the patient and his wife, but they both declined.        The patient was then admitted last month with another episode of spell, which was concerning for a TIA even possible seizure.  He was seen by Neurology.  His seizure medications were increased.        He had an echocardiogram done about a week ago that showed LVEF of 40%-45%, negative bubble study, mild mitral regurgitation, mild mitral stenosis, mild aortic stenosis.  Overall, LVEF appears somewhat similar to what it was earlier this year, around  45%.        The patient today is in a wheelchair, but he is able to walk with a cane.  He can walk up to 2 blocks but then stops voluntarily.  He does not feel any chest discomfort or shortness of breath with physical activity.  He is on dual antiplatelet therapy and is tolerating it quite well without any issues.  He is on Lipitor 20 mg daily.  He is on metoprolol and lisinopril.  LDL is reasonably controlled at 64, blood pressure is well controlled.      PHYSICAL EXAMINATION:   VITAL SIGNS:  Blood pressure 109/53, heart rate 56, regular, weight 166 pounds, BMI 23.93.   GENERAL:  The patient appears pleasant, comfortable.   NECK:  Normal JVP, no bruit.  Right carotid endarterectomy scar seen.   CARDIOVASCULAR:  S1, S2 normal, no murmur, rub or gallop.   RESPIRATORY:  Clear to auscultation bilaterally.   GASTROINTESTINAL SYSTEM:  Abdomen  soft, nontender.   EXTREMITIES:  No pitting pedal edema.   NEUROLOGICAL:  Alert, oriented x3.   PSYCHIATRIC:  Normal affect.   SKIN:  No obvious rash.   HEENT:  No pallor or icterus.      IMPRESSION AND PLAN:  A pleasant 89-year-old gentleman with severe 3-vessel disease on coronary angiogram last year with occluded RCA with severe circumflex and LAD disease, both the LAD and circumflex disease, underwent successful drug-eluting stent PCIs.  He has ischemic cardiomyopathy with LVEF around 40%-45%.  Clinically, not having any anginal symptoms or congestive heart failure.  He is on appropriate CAD medication therapy of antiplatelet therapy, statin, beta blocker and ACE inhibitor.  LDL is well controlled.  Blood pressure is well controlled.        I had a long discussion with the patient regarding option of continuing dual antiplatelet therapy beyond 1 year of MI versus single antiplatelet therapy.  We discussed pros and cons of each strategy.  After a long discussion, the patient decided to continue dual antiplatelet therapy, which I think is reasonable.  Going forward, if he has any  adverse effect on dual antiplatelet therapy, I will have a low threshold to change it to mono antiplatelet therapy.  It looks like Neurology was also okay with mono antiplatelet therapy regarding stroke prophylaxis.      Regarding the spells, he has seen Dr. Ware.  A loop recorder was offered, which he declined and he and his wife also confirmed today that they do not want to pursue it.  I went in detail regarding the history of these 3 episodes, especially with his wife who is a very astute observer, and per discussion with the wife, these episodes sounds less likely to be arrhythmogenic and more likely concerning for TIA versus seizures as he only briefly lost consciousness with only 1 episode and with the other episode he appears stiff with garbled speech.  However, if there are more episodes of these spells or loss of consciousness, I recommend them to contact Dr. Ware's office for consideration of a loop recorder at that time.        Otherwise, if he is stable in terms of cardiac status, we can see him back in 1 year, sooner if he notes any change in clinical status, especially any exertional-related symptoms.         JUANITA LOPEZ MD             D: 2019   T: 2019   MT: ARINA      Name:     SID AGUIAR   MRN:      [id number]        Account:      [id number]   :      1928           Service Date: 2019      Document: [id number]

## 2019-08-13 NOTE — LETTER
8/13/2019    Matt Morrissey MD  [address] [facility] [address]  [facility] [address]    RE: Dustin Pearce       Dear Colleague,    I had the pleasure of seeing Dustin Pearce in the [facility].    HPI and Plan:   See dictation(#[id number])    Orders Placed This Encounter   Procedures     Follow-Up with Cardiologist       No orders of the defined types were placed in this encounter.      There are no discontinued medications.      Encounter Diagnoses   Name Primary?     Coronary artery disease of native artery of native heart with stable angina pectoris (H)      Cardiomyopathy, unspecified type (H)      Paroxysmal atrial fibrillation (H)        CURRENT MEDICATIONS:  Current Outpatient Medications   Medication Sig Dispense Refill     AMITRIPTYLINE HCL PO Take 25 mg by mouth nightly as needed for sleep       aspirin EC 81 MG EC tablet Take 1 tablet (81 mg) by mouth daily 30 tablet 0     atorvastatin (LIPITOR) 20 MG tablet Take 2 tablets (40 mg) by mouth daily 30 tablet 0     bisacodyl (DULCOLAX) 10 MG Suppository Place 10 mg rectally daily as needed for constipation       blood glucose monitoring (NO BRAND SPECIFIED) meter device kit Use to test blood sugar bid times daily or as directed. 1 kit 0     blood glucose monitoring (TRUE METRIX BLOOD GLUCOSE TEST) test strip USE TO TEST BLOOD SUGAR THREE TIMES DAILY OR AS DIRECTED 300 strip 1     clopidogrel (PLAVIX) 75 MG tablet TAKE 1 TABLET BY MOUTH DAILY 90 tablet 1     DULoxetine (CYMBALTA) 30 MG EC capsule TAKE 1 CAPSULE(30 MG) BY MOUTH DAILY 90 capsule 3     insulin glargine (BASAGLAR KWIKPEN) 100 UNIT/ML pen Inject 26 Units Subcutaneous At Bedtime 15 mL 11     ipratropium (ATROVENT) 0.03 % nasal spray INHALE 1 SPRAY IN EACH NOSTRIL EVERY 12 HOURS AS NEEDED 30 mL 11     levETIRAcetam (KEPPRA) 750 MG tablet Take 1 tablet (750 mg) by mouth 2 times daily 60 tablet 0     lisinopril (PRINIVIL/ZESTRIL) 2.5 MG tablet TAKE 1 TABLET(2.5 MG) BY MOUTH DAILY 90  "tablet 1     MAGNESIUM-OXIDE 400 (241.3 Mg) MG tablet TAKE 1 TABLET BY MOUTH TWICE DAILY 180 tablet 0     metFORMIN (GLUCOPHAGE) 1000 MG tablet Take 1 tablet (1,000 mg) by mouth 2 times daily (with meals) 180 tablet 3     metoprolol succinate ER (TOPROL-XL) 25 MG 24 hr tablet Take 12.5 mg by mouth At Bedtime       nitroGLYcerin (NITROSTAT) 0.4 MG sublingual tablet For chest pain place 1 tablet under the tongue every 5 minutes for 3 doses. If symptoms persist 5 minutes after 1st dose call 911. 25 tablet 0     omeprazole (PRILOSEC) 40 MG DR capsule TAKE 1 CAPSULE(40 MG) BY MOUTH DAILY 30 TO 60 MINUTES BEFORE A MEAL 90 capsule 3     order for DME Equipment being ordered: True Matrix Blood Glucose meter. 1 Act 11     polyethylene glycol (MIRALAX/GLYCOLAX) Packet Take 17 g by mouth daily as needed for constipation       tamsulosin (FLOMAX) 0.4 MG capsule Take 0.4 mg by mouth At Bedtime         ALLERGIES     Allergies   Allergen Reactions     Oxycodone Other (See Comments)     \"TERRIBLE SWEATING\"     Sulfa Drugs      Tetracycline        PAST MEDICAL HISTORY:  Past Medical History:   Diagnosis Date     Abdominal aortic aneurysm (H) 12/25/2016     Acute on chronic systolic congestive heart failure (H) 6/7/2018     Anemia 6/7/2018     Anemia due to blood loss, acute 6/13/2017     Aortic stenosis     mild     Benign essential hypertension 1/6/2017     Benign non-nodular prostatic hyperplasia with lower urinary tract symptoms 10/20/2016     CAD (coronary artery disease)     MI 1970     Carotid artery stenosis 10/20/2016    chronically occluded left internal carotid artery     Cerebrovascular accident (H) 10/20/2016     Cognitive impairment 6/7/2018     Coronary artery disease of native artery of native heart with stable angina pectoris (H) 10/20/2016    MI 1970     Depression, unspecified depression type 2/22/2018     Diabetes mellitus (H)      Diabetic ulcer of left foot associated with diabetes mellitus due to underlying " condition (H) 6/12/2017     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     Gastro-oesophageal reflux disease      Gastroesophageal reflux disease without esophagitis 10/20/2016     Heart attack (H)      Hyperlipidemia      Hyperlipidemia, unspecified hyperlipidemia type 10/20/2016     Ischemic cardiomyopathy      Lumbar back pain 12/30/2016     Mild cognitive impairment 10/20/2016     Nephrolithiasis     RECURRENT     NSTEMI (non-ST elevated myocardial infarction) (H) 6/7/2018     Osteopenia      PAD (peripheral artery disease) (H)     peripheral angiogram 5/2017: The common iliac artery stenoses were stented and the superficial femoral artery stenoses were angioplastied     Paroxysmal atrial fibrillation (H)      Peripheral artery disease (H)     peripheral angiogram 5/2017: The common iliac artery stenoses were stented and the superficial femoral artery stenoses were angioplastied     RBBB with left anterior fascicular block      Slow transit constipation 2/22/2018     Stroke of unknown etiology (H) 12/31/2017     Syncope      TIA (transient ischemic attack) 1/20/2017     Unspecified cerebral artery occlusion with cerebral infarction      UTI (urinary tract infection) due to Enterococcus 2/22/2018     Ventricular tachycardia (H)     nonsustained       PAST SURGICAL HISTORY:  Past Surgical History:   Procedure Laterality Date     AMPUTATE FOOT Left 6/4/2017    Procedure: AMPUTATE FOOT;  Sun Add#3: partial left foot amputation - Sagital Saw - Mini C-Arm;  Surgeon: Moe Kirk DPM;  Location:  OR     CHOLECYSTECTOMY       ENDARTERECTOMY CAROTID Right 1/3/2018    Procedure: ENDARTERECTOMY CAROTID;  RIGHT CAROTID ENDARTERECTOMY WITH EEG;  Surgeon: Roland Rodriguez MD;  Location:  OR     ESOPHAGOSCOPY, GASTROSCOPY, DUODENOSCOPY (EGD), COMBINED N/A 12/26/2016    Procedure: COMBINED ESOPHAGOSCOPY, GASTROSCOPY, DUODENOSCOPY (EGD);  Surgeon: Nasim Louis MD;  Location:  GI      GENITOURINARY SURGERY      KIDNEY STONE REMOVAL X 4     HC UGI ENDOSCOPY W EUS N/A 2016    Procedure: COMBINED ENDOSCOPIC ULTRASOUND, ESOPHAGOSCOPY, GASTROSCOPY, DUODENOSCOPY (EGD);  Surgeon: Nasim Louis MD;  Location:  GI     HERNIA REPAIR       INCISION AND DRAINAGE FOOT, COMBINED Left 2017    Procedure: COMBINED INCISION AND DRAINAGE FOOT;  REVISIONAL LEFT FOOT INCISION AND DRAINAGE WITH POSSIBLE FLAP CLOSURE , ANTIBIOTIC SOLUTION ;  Surgeon: Nabil Ann DPM;  Location:  OR     LASER HOLMIUM LITHOTRIPSY URETER(S), INSERT STENT, COMBINED  3/2/2012    Procedure:COMBINED CYSTOSCOPY, URETEROSCOPY, LASER HOLMIUM LITHOTRIPSY URETER(S), INSERT STENT; CYSTOSCOPY, RIGHT RETROGRADES, RIGHT URETEROSCOPY, HOLMIUM LASER, RIGHT STENT PLACEMENT; Surgeon:ANJELICA LEÓN; Location: OR     ROTATOR CUFF REPAIR RT/LT         FAMILY HISTORY:  Family History   Problem Relation Age of Onset     Breast Cancer Mother      Heart Failure Father 81       SOCIAL HISTORY:  Social History     Socioeconomic History     Marital status:      Spouse name: None     Number of children: None     Years of education: None     Highest education level: None   Occupational History     None   Social Needs     Financial resource strain: None     Food insecurity:     Worry: None     Inability: None     Transportation needs:     Medical: None     Non-medical: None   Tobacco Use     Smoking status: Former Smoker     Packs/day: 1.00     Years: 30.00     Pack years: 30.00     Types: Cigarettes     Start date:      Last attempt to quit: 1999     Years since quittin.6     Smokeless tobacco: Never Used   Substance and Sexual Activity     Alcohol use: Not Currently     Alcohol/week: 4.2 oz     Types: 7 Standard drinks or equivalent per week     Comment: 1 drink per biweekly     Drug use: No     Sexual activity: Not Currently     Partners: Female   Lifestyle     Physical activity:     Days per week:  "None     Minutes per session: None     Stress: None   Relationships     Social connections:     Talks on phone: None     Gets together: None     Attends Zoroastrianism service: None     Active member of club or organization: None     Attends meetings of clubs or organizations: None     Relationship status: None     Intimate partner violence:     Fear of current or ex partner: None     Emotionally abused: None     Physically abused: None     Forced sexual activity: None   Other Topics Concern     Parent/sibling w/ CABG, MI or angioplasty before 65F 55M? Not Asked   Social History Narrative     None       Review of Systems:  Skin:  Positive for bruising basel cell on face   Eyes:  Positive for glasses    ENT:  Positive for hearing loss    Respiratory:  Positive for cough     Cardiovascular:  Negative;palpitations;chest pain;dizziness;syncope or near-syncope;cyanosis;edema exercise intolerance;Positive for;lightheadedness;fatigue    Gastroenterology: Positive for abdominal pain    Genitourinary:  Negative      Musculoskeletal:  Positive for muscular weakness;joint pain    Neurologic:  Positive for stroke    Psychiatric:  Positive for sleep disturbances    Heme/Lymph/Imm:  Negative      Endocrine:  Positive for diabetes      Physical Exam:  Vitals: /53 (BP Location: Left arm, Cuff Size: Adult Large)   Pulse 56   Ht 1.778 m (5' 10\")   Wt 75.7 kg (166 lb 12.8 oz)   BMI 23.93 kg/m       Constitutional:           Skin:             Head:           Eyes:           Lymph:      ENT:           Neck:           Respiratory:            Cardiac:                                                           GI:           Extremities and Muscular Skeletal:                 Neurological:           Psych:             CC  Catherine Laurent, APRN CNP  [facility]  [address]  [place] [address]                    Thank you for allowing me to participate in the care of your patient.      Sincerely,     Rafa Samuel MD "     [facility] [place] [facility]    cc:   ASPEN Rosales CNP  [facility]  [address]  [place] [id number]

## 2019-08-14 NOTE — TELEPHONE ENCOUNTER
Requested Prescriptions   Pending Prescriptions Disp Refills     MAGNESIUM-OXIDE 400 (241.3 Mg) MG tablet [Pharmacy Med Name: MAG-OXIDE 400MG TABLETS] 180 tablet 0     Sig: TAKE 1 TABLET BY MOUTH TWICE DAILY       There is no refill protocol information for this order        Last Written Prescription Date:  05/08/19  Last Fill Quantity: 180,  # refills: 0   Last office visit: 7/26/2019 with prescribing provider:     Future Office Visit:   Next 5 appointments (look out 90 days)    Aug 29, 2019  6:30 PM CDT  Office Visit with Matt Morrissey MD  [facility] [address] [facility] [address]  [phone]         Cris Silverio MA

## 2019-08-23 NOTE — PROGRESS NOTES
Clinic Care Coordination Contact  [facility]/Voicemail    Referral Source: ED Follow-Up    Universal Utilization: ED visit 7/19/2019-  Transient expressive aphasia and confusion ? TIA , seizure  History of TIA and CVA in the past.   Seizure disorder  Chronic left ICA occlusion.   History of right carotid stenosis, status post CEA.     Clinical Data: Care Coordinator Outreach    Outreach attempted x 1.  Left message on voicemail with call back information and requested return call.    Plan: Care Coordinator will try to reach patient again in 3-5 business days.    Farnaz Eddy RN, Care Coordinator   [facility] -Care Coordination  [facility] , [facility] and [facility]   [phone]

## 2019-08-24 NOTE — ED TRIAGE NOTES
1800 pt had sudden onset of garbled speech per wife. Pt unable to give full name in triage. Pt's wife states prior to speech difficulty pt c/o dizziness. Several similar episodes in past 4 months. Pt had difficulty getting out of car into wheelchair. Code Stroke called from triage.

## 2019-08-24 NOTE — LETTER
Transition Communication Hand-off for Care Transitions to Next Level of Care Provider    Name: Dustin Pearce  : 1928  MRN #: [id number]  Primary Care Provider: Matt Morrissey     Primary Clinic: [facility] [address] [address]     Reason for Hospitalization:  Episode of change in speech [R47.89]  Admit Date/Time: 2019  6:18 PM  Discharge Date: 19  Payor Source: Payor: MEDICARE / Plan: MEDICARE / Product Type: Medicare /     Readmission Assessment Measure (LICHA) Risk Score/category:  ELEVATED    Reason for Communication Hand-off Referral: Fragility  Other ELEVATED LICAH, discharge plan TCU    Discharge Plan:  [facility] [facility]     Concern for non-adherence with plan of care:  No, significant other Halina very involved with his care    Discharge Needs Assessment:  Needs      Most Recent Value   # of Referrals Placed by CTS  Post Acute Facilities   PAS Number  [id number]        Follow-up specialty is recommended: Yes, Neurology (sees Dr. Padilla at [facility])    Follow-up plan:  No future appointments.    Amy Engel RN    AVS/Discharge Summary is the source of truth; this is a helpful guide for improved communication of patient story

## 2019-08-25 NOTE — PROGRESS NOTES
Patient was neurological stable at the beginning of the shift.  Wife verbalized that patient was in his old self again. Thirty minutes later, patient's wife called to inform that patient was having difficulty holding his fork as well as difficulty finding words.  Patient had garbled speech, mildly disoriented to place and situation and complaining of diminished sensation to the left face. Otherwise, his neuro exam was within his baseline.  Charge RN was informed and rapid response and hospitalist were paged who both came to the bedside,  with the latter  suggested to call for a code stroke. Neurology and and staff came.  Another IV was started. Patient was placed on tele.  After long conversation between neuro staff and  patient's wife, no further diagnostic procedure is to be done.  Patient has intermittent garbled speech thereafter with very slight left facial droop.  Currently having headache and was given Tylenol PO.

## 2019-08-25 NOTE — PROGRESS NOTES
PRIMARY DIAGNOSIS: TIA  OUTPATIENT/OBSERVATION GOALS TO BE MET BEFORE DISCHARGE:  1. Orthostatic performed: N/A    2. Diagnostic testing complete & at baseline neurologic testing: Yes    3. Cleared by consultants (if involved): No    4. Interpretation of cardiac rhythm per telemetry tech: SR, BBB    5. Tolerating adequate PO diet and medications: Yes    6. Return to near baseline physical activity or neurologic status: Yes    Discharge Planner Nurse   Safe discharge environment identified: No  Barriers to discharge: Yes       Entered by: Gloria Simmons 08/25/2019 6:06 AM     Please review provider order for any additional goals.   Nurse to notify provider when observation goals have been met and patient is ready for discharge.

## 2019-08-25 NOTE — PLAN OF CARE
PRIMARY DIAGNOSIS: TIA  OUTPATIENT/OBSERVATION GOALS TO BE MET BEFORE DISCHARGE:  1. Orthostatic performed: N/A    2. Diagnostic testing complete & at baseline neurologic testing: Yes    3. Cleared by consultants (if involved): No    4. Interpretation of cardiac rhythm per telemetry tech: SR,BBB    5. Tolerating adequate PO diet and medications: Yes    6. Return to near baseline physical activity or neurologic status: Yes    Discharge Planner Nurse   Safe discharge environment identified: No  Barriers to discharge: Yes       Entered by: Shanika Wilcox 08/25/2019 5:28 PM     Please review provider order for any additional goals.   Nurse to notify provider when observation goals have been met and patient is ready for discharge.    Disoriented to year. Neuros - forgetful, baseline [place] (hearing aids in place). Glasses at bedside. VSS. Tele SR, BBB. Mod CHO diet with thin liquids. Takes pills whole. Up with A1, cane. Denies pain. Pt scoring green on the Aggression Stop Light Tool. Plan for PT/OT/SLP.

## 2019-08-25 NOTE — H&P
[facility]  History and Physical - Hospitalist Service       Date of Admission:  8/24/2019    Assessment & Plan   Mr. Dustin Pearce is a 89-year-old male with past medical history significant for IDDM, HTN, HL, anxiety and depression, prior TIA, recent admissions x2 for probable TIA vs seizure, chronic systolic CHF, severe 3-V CAD  with history of MODESTA to proximal LAD and proximal left circumflex, BPH, chronic left carotid artery occlusion, h/o of right CEA. He was brought to the ER by his wife for evaluation of slurred speech.      1.  Slurred speech, suspect TIA   2.  History of TIAs and CVA in the past  3.  History of seizure disorder  4.  Chronic left ICA occlusion  5.  History of right carotid stenosis, status post CEA  Similar presentation on 7/16.  Work-up was negative at that time.  PTA aspirin and Plavix were continued and Keppra was increased to 750 mg twice daily from 500 mg twice daily. Today's presenting symptom, slurring of speech has completely resolved, though his wife feels he is otherwise not back to his baseline.  CT head perfusion study negative, CTA with chronic left ICA occlusion.  Neurology consulted from ER.   -Registered to observation  -Continue PTA aspirin and Plavix, statin.  -TTE with bubble was negative a month ago, at this point defer further work-up including echo/MRI/EEG to neurology.  -PT OT SLP alida, moderate carb diet when clears swallow evaluation.  -Continue PTA Keppra.      6.   IDDM: HbA1c 8.3 about 2 months ago.  - If he clears swallow evaluation, place him on mod carb diet and resume PTA Lantus.  Will hold PTA metformin   -Will place him on sliding scale insulin.       7.  Hypertension.    - Hold PTA lisinopril and Toprol to allow some permissive hypertension.   - Labetalol and hydralazine p.r.n. available     8.  Dyslipidemia: Continue PTA atorvastatin.      9.  Seizure disorder:  - Resume PTA Keppra 750 mg p.o. twice daily     10.  GERD:  Continue Prilosec.    11.  BPH: Continue Flomax.   12.  Depression: Continue Cymbalta.   13.  h/o systolic CHF: LVEF 40-45% based on echo earlier this month.  Has diastolic dysfunction, grade 1.  -Not on diuretic at baseline.  Does not appear fluid overloaded.    -Gentle IV hydration given TIA.  -Monitor for signs of overload.     Diet: NPO now, pending bedsite swallow eval, will be on mod carb diet.  DVT Prophylaxis: Pneumatic Compression Devices  Luis Catheter: not present  Code Status: Full code, discussed with patient and his wife    Disposition Plan   Expected discharge: Tomorrow, recommended to prior living arrangement pending neurology eval.  Entered: Gagan Jerome MD 08/24/2019, 8:16 PM     The patient's care was discussed with the Patient and Patient's Family.    Gagan Jerome MD  [facility]    ______________________________________________________________________    Chief Complaint   Slurred speech    History is obtained from the patient, his wife, chart review and discussion with ED attending    History of Present Illness   Mr. Dustin Pearce is a 89-year-old male with past medical history significant for IDDM, HTN, HL, anxiety and depression, prior TIA, recent admissions x2 for probable TIA vs seizure, chronic systolic CHF, severe 3-V CAD  with history of MODESTA to proximal LAD and proximal left circumflex, BPH, chronic left carotid artery occlusion, h/o of right CEA.  He was brought to the ER by his wife for evaluation of slurred speech.     According to the patient's wife, he felt dizzy before going to the [employer] this afternoon.  He reported this to his wife again while he was at [employer].  Around 630 pm, while they were at [employer], she noticed his speech was garbled and could not understand him.  There was no facial droop, seizure-like activity, unilateral weakness.  Given his history is of TIAs, she was concerned and drove him to ER.  She reports that slurring of speech had already improved and  completely resolved while he was in ER.  She still feels that he is not completely back to his normal.    Patient denies any headache, nausea, diplopia, chest pain, palpitation, dysuria, abdominal pain or diarrhea.    In ER patient was evaluated by Dr. Oreilly.  Since labs including CBC BMP were unremarkable.  EKG was sinus, right bundle branch block, unchanged from previous, CT head perfusion study was normal.  CT angiogram head and neck showed chronic finding of left ICA occlusion.  Stroke neurologist consulted from ER.  Recommended continuing dual antiplatelet and observation.    Review of Systems    The 10 point Review of Systems is negative other than noted in the HPI or here.      Past Medical History    I have reviewed this patient's medical history and updated it with pertinent information if needed.   Past Medical History:   Diagnosis Date     Abdominal aortic aneurysm (H) 12/25/2016     Acute on chronic systolic congestive heart failure (H) 6/7/2018     Anemia 6/7/2018     Anemia due to blood loss, acute 6/13/2017     Aortic stenosis     mild     Benign essential hypertension 1/6/2017     Benign non-nodular prostatic hyperplasia with lower urinary tract symptoms 10/20/2016     CAD (coronary artery disease)     MI 1970     Carotid artery stenosis 10/20/2016    chronically occluded left internal carotid artery     Cerebrovascular accident (H) 10/20/2016     Cognitive impairment 6/7/2018     Coronary artery disease of native artery of native heart with stable angina pectoris (H) 10/20/2016    MI 1970     Depression, unspecified depression type 2/22/2018     Diabetes mellitus (H)      Diabetic ulcer of left foot associated with diabetes mellitus due to underlying condition (H) 6/12/2017     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     Gastro-oesophageal reflux disease      Gastroesophageal reflux disease without esophagitis 10/20/2016     Heart attack (H)      Hyperlipidemia       Hyperlipidemia, unspecified hyperlipidemia type 10/20/2016     Ischemic cardiomyopathy      Lumbar back pain 12/30/2016     Mild cognitive impairment 10/20/2016     Nephrolithiasis     RECURRENT     NSTEMI (non-ST elevated myocardial infarction) (H) 6/7/2018     Osteopenia      PAD (peripheral artery disease) (H)     peripheral angiogram 5/2017: The common iliac artery stenoses were stented and the superficial femoral artery stenoses were angioplastied     Paroxysmal atrial fibrillation (H)      Peripheral artery disease (H)     peripheral angiogram 5/2017: The common iliac artery stenoses were stented and the superficial femoral artery stenoses were angioplastied     RBBB with left anterior fascicular block      Slow transit constipation 2/22/2018     Stroke of unknown etiology (H) 12/31/2017     Syncope      TIA (transient ischemic attack) 1/20/2017     Unspecified cerebral artery occlusion with cerebral infarction      UTI (urinary tract infection) due to Enterococcus 2/22/2018     Ventricular tachycardia (H)     nonsustained       Past Surgical History   I have reviewed this patient's surgical history and updated it with pertinent information if needed.  Past Surgical History:   Procedure Laterality Date     AMPUTATE FOOT Left 6/4/2017    Procedure: AMPUTATE FOOT;  Sun Add#3: partial left foot amputation - Sagital Saw - Mini C-Arm;  Surgeon: Moe Kirk DPM;  Location:  OR     CHOLECYSTECTOMY       ENDARTERECTOMY CAROTID Right 1/3/2018    Procedure: ENDARTERECTOMY CAROTID;  RIGHT CAROTID ENDARTERECTOMY WITH EEG;  Surgeon: Roland Rodriguez MD;  Location:  OR     ESOPHAGOSCOPY, GASTROSCOPY, DUODENOSCOPY (EGD), COMBINED N/A 12/26/2016    Procedure: COMBINED ESOPHAGOSCOPY, GASTROSCOPY, DUODENOSCOPY (EGD);  Surgeon: Nasim Louis MD;  Location:  GI     GENITOURINARY SURGERY      KIDNEY STONE REMOVAL X 4     HC UGI ENDOSCOPY W EUS N/A 12/29/2016    Procedure: COMBINED ENDOSCOPIC  ULTRASOUND, ESOPHAGOSCOPY, GASTROSCOPY, DUODENOSCOPY (EGD);  Surgeon: Nasim Louis MD;  Location:  GI     HERNIA REPAIR       INCISION AND DRAINAGE FOOT, COMBINED Left 2017    Procedure: COMBINED INCISION AND DRAINAGE FOOT;  REVISIONAL LEFT FOOT INCISION AND DRAINAGE WITH POSSIBLE FLAP CLOSURE , ANTIBIOTIC SOLUTION ;  Surgeon: Nabil Ann DPM;  Location:  OR     LASER HOLMIUM LITHOTRIPSY URETER(S), INSERT STENT, COMBINED  3/2/2012    Procedure:COMBINED CYSTOSCOPY, URETEROSCOPY, LASER HOLMIUM LITHOTRIPSY URETER(S), INSERT STENT; CYSTOSCOPY, RIGHT RETROGRADES, RIGHT URETEROSCOPY, HOLMIUM LASER, RIGHT STENT PLACEMENT; Surgeon:ANJELICA LEÓN; Location: OR     ROTATOR CUFF REPAIR RT/LT         Social History   I have reviewed this patient's social history and updated it with pertinent information if needed.  Social History     Tobacco Use     Smoking status: Former Smoker     Packs/day: 1.00     Years: 30.00     Pack years: 30.00     Types: Cigarettes     Start date:      Last attempt to quit: 1999     Years since quittin.6     Smokeless tobacco: Never Used   Substance Use Topics     Alcohol use: Not Currently     Alcohol/week: 4.2 oz     Types: 7 Standard drinks or equivalent per week     Comment: 1 drink per biweekly     Drug use: No       Family History   I have reviewed this patient's family history and updated it with pertinent information if needed.   Family History   Problem Relation Age of Onset     Breast Cancer Mother      Heart Failure Father 81        Prior to Admission Medications   Prior to Admission Medications   Prescriptions Last Dose Informant Patient Reported? Taking?   AMITRIPTYLINE HCL PO  at PRN Spouse/Significant Other Yes Yes   Sig: Take 25 mg by mouth nightly as needed for sleep   DULoxetine (CYMBALTA) 30 MG EC capsule 2019 at am Spouse/Significant Other No Yes   Sig: TAKE 1 CAPSULE(30 MG) BY MOUTH DAILY   MAGNESIUM-OXIDE 400 (241.3 Mg) MG  tablet 8/24/2019 at am Spouse/Significant Other No Yes   Sig: TAKE 1 TABLET BY MOUTH TWICE DAILY   acetaminophen (TYLENOL) 500 MG tablet  at PRN Spouse/Significant Other Yes Yes   Sig: Take 500-1,000 mg by mouth every 6 hours as needed for mild pain   aspirin EC 81 MG EC tablet 8/24/2019 at am Spouse/Significant Other No Yes   Sig: Take 1 tablet (81 mg) by mouth daily   atorvastatin (LIPITOR) 20 MG tablet 8/24/2019 at am Spouse/Significant Other No Yes   Sig: Take 2 tablets (40 mg) by mouth daily   bisacodyl (DULCOLAX) 10 MG Suppository  at PRN Spouse/Significant Other Yes Yes   Sig: Place 10 mg rectally daily as needed for constipation   blood glucose monitoring (NO BRAND SPECIFIED) meter device kit  Spouse/Significant Other No No   Sig: Use to test blood sugar bid times daily or as directed.   blood glucose monitoring (TRUE METRIX BLOOD GLUCOSE TEST) test strip  Spouse/Significant Other No No   Sig: USE TO TEST BLOOD SUGAR THREE TIMES DAILY OR AS DIRECTED   clopidogrel (PLAVIX) 75 MG tablet 8/24/2019 at am Spouse/Significant Other No Yes   Sig: TAKE 1 TABLET BY MOUTH DAILY   insulin glargine (BASAGLAR KWIKPEN) 100 UNIT/ML pen 8/23/2019 at Unknown time Spouse/Significant Other No Yes   Sig: Inject 26 Units Subcutaneous At Bedtime   ipratropium (ATROVENT) 0.03 % nasal spray  at PRN Spouse/Significant Other No Yes   Sig: INHALE 1 SPRAY IN EACH NOSTRIL EVERY 12 HOURS AS NEEDED   levETIRAcetam (KEPPRA) 750 MG tablet 8/24/2019 at am Spouse/Significant Other No Yes   Sig: Take 1 tablet (750 mg) by mouth 2 times daily   lisinopril (PRINIVIL/ZESTRIL) 2.5 MG tablet 8/24/2019 at am Spouse/Significant Other No Yes   Sig: TAKE 1 TABLET(2.5 MG) BY MOUTH DAILY   metFORMIN (GLUCOPHAGE) 1000 MG tablet 8/24/2019 at am Spouse/Significant Other No Yes   Sig: Take 1 tablet (1,000 mg) by mouth 2 times daily (with meals)   metoprolol succinate ER (TOPROL-XL) 25 MG 24 hr tablet 8/23/2019 at Unknown time Spouse/Significant Other Yes Yes  "  Sig: Take 12.5 mg by mouth At Bedtime   nitroGLYcerin (NITROSTAT) 0.4 MG sublingual tablet  at PRN Spouse/Significant Other No Yes   Sig: For chest pain place 1 tablet under the tongue every 5 minutes for 3 doses. If symptoms persist 5 minutes after 1st dose call 911.   omeprazole (PRILOSEC) 40 MG DR capsule 8/24/2019 at am Spouse/Significant Other No Yes   Sig: TAKE 1 CAPSULE(40 MG) BY MOUTH DAILY 30 TO 60 MINUTES BEFORE A MEAL   order for DME  Spouse/Significant Other No No   Sig: Equipment being ordered: True Matrix Blood Glucose meter.   polyethylene glycol (MIRALAX/GLYCOLAX) Packet  at PRN Spouse/Significant Other Yes Yes   Sig: Take 17 g by mouth daily as needed for constipation   polyethylene glycol-propylene glycol (SYSTANE ULTRA) 0.4-0.3 % SOLN ophthalmic solution  at PRN Spouse/Significant Other Yes Yes   Sig: Place 2 drops into both eyes daily as needed for dry eyes   tamsulosin (FLOMAX) 0.4 MG capsule 8/23/2019 at Unknown time Spouse/Significant Other Yes Yes   Sig: Take 0.4 mg by mouth At Bedtime      Facility-Administered Medications: None     Allergies   Allergies   Allergen Reactions     Oxycodone Other (See Comments)     \"TERRIBLE SWEATING\"     Sulfa Drugs      Tetracycline        Physical Exam   Vital Signs: Temp: 97.5  F (36.4  C) Temp src: Oral BP: 107/63 Pulse: 57 Heart Rate: 61 Resp: 18 SpO2: 96 % O2 Device: None (Room air)    Weight: 168 lbs 0 oz    General: AAOx3, appears comfortable.  HEENT: PERRLA EOMI. Mucosa moist.   Lungs: Bilateral equal air entry. Clear to auscultation, normal work of breathing.   CVS: S1S2 regular, no tachycardia or murmur.   Abdomen: Soft, NT, ND. BS heard.  MSK: No edema or deformities.  Neuro: AAOX3. CN 2-12 normal. Strength symmetrical.  Skin: No rash.     Data   Data reviewed today: I reviewed all medications, new labs and imaging results over the last 24 hours. I personally reviewed the EKG tracing showing Sinus rhythm, left axis deviation, right bundle branch " block, normal rate, unchanged from prior.    Recent Labs   Lab 08/24/19  1825   WBC 7.2   HGB 12.5*   MCV 90      INR 0.94      POTASSIUM 4.7   CHLORIDE 105   CO2 31   BUN 19   CR 0.86   ANIONGAP 5   ALLISON 9.8   *     Recent Results (from the past 24 hour(s))   CT Head w/o Contrast    Narrative    CT OF THE HEAD WITHOUT CONTRAST 8/24/2019 6:40 PM     COMPARISON: Brain MR 7/17/2019.    HISTORY: Word finding difficulties.    TECHNIQUE: 5 mm thick axial CT images of the head were acquired  without IV contrast material.    FINDINGS: There is moderate diffuse cerebral volume loss. There are  subtle patchy areas of decreased density in the cerebral white matter  bilaterally that are consistent with sequela of chronic small vessel  ischemic disease. Chronic encephalomalacia involving the anterolateral  aspect of the left frontal lobe and anterior aspect of the insula is  again noted without change.    The ventricles and basal cisterns are within normal limits in  configuration given the degree of cerebral volume loss.  There is no  midline shift. There are no extra-axial fluid collections.    No intracranial hemorrhage, mass or recent infarct.    The visualized paranasal sinuses are well-aerated. There is no  mastoiditis. There are no fractures of the visualized bones.      Impression    IMPRESSION: Diffuse cerebral volume loss and cerebral white matter  changes consistent with chronic small vessel ischemic disease. No  evidence for acute intracranial pathology.  No change from the  comparison study.      Radiation dose for this scan was reduced using automated exposure  control, adjustment of the mA and/or kV according to patient size, or  iterative reconstruction technique   CTA Head Neck with Contrast    Narrative    CT ANGIOGRAM OF THE HEAD AND NECK WITHOUT AND WITH CONTRAST  8/24/2019  6:45 PM     COMPARISON: Head and neck CT angiogram 7/16/2019.    HISTORY: Confusion. Word finding  difficulties.    TECHNIQUE:  Precontrast localizing scans were followed by CT  angiography with an injection of 70 mL Isouve-370 nonionic intravenous  contrast material with scans through the head and neck.  Images were  transferred to a separate 3-D workstation where multiplanar  reformations and 3-D images were created.  Estimates of carotid  stenoses are made relative to the distal internal carotid artery  diameters except as noted.      FINDINGS:   Neck CTA: There is mild atherosclerotic narrowing at the origin of the  left common carotid artery. There is mild atherosclerotic narrowing of  the midportion of the left common carotid artery. The right common  carotid artery is patent without stenosis. Again noted is complete  occlusion of the left internal carotid artery from the origin to the  left carotid terminus. The right internal carotid artery is tortuous  but is patent without stenosis by NASCET criteria. The vertebral  arteries bilaterally are patent without stenosis.    Head CTA: There is calcified atherosclerotic plaque of the  intracranial distal right internal carotid artery that does not result  in any vascular narrowing. The intracranial left internal carotid  artery is occluded. Again noted is irregularity in contour and mild  narrowing of the distal P2 segment of the left posterior cerebral  artery that is likely atherosclerotic in nature. The left posterior  cerebral artery and its branches are otherwise patent and  unremarkable. The basilar, bilateral anterior cerebral, bilateral  middle cerebral and right posterior cerebral arteries are patent and  unremarkable. The anterior communicating and bilateral posterior  communicating arteries are patent.      Impression    IMPRESSION:  1. Complete occlusion of the left internal carotid artery from the  origin to the left carotid terminus again noted.  2. Presumed mild atherosclerotic narrowing of the distal P2 segment of  the left posterior cerebral  artery again noted.  3. Plaque without stenosis of the intracranial distal right internal  carotid artery again noted.  4. Otherwise, normal neck and head CTA.      Radiation dose for this scan was reduced using automated exposure  control, adjustment of the mA and/or kV according to patient size, or  iterative reconstruction technique   CT Head w Contrast    Narrative    CT BRAIN PERFUSION 8/24/2019 6:55 PM    COMPARISON: None.    HISTORY: Word finding difficulties. Confusion.    TECHNIQUE: Time sequential axial CT images of the head were acquired  during the administration of intravenous contrast (50 mL [id number]-370).  CTA images of the [place] as well as color perfusion maps of  the brain were created from this time sequential axial source data.      Impression    IMPRESSION: There is decreased cerebral blood flow and decreased  cerebral blood volume within and at the margins of the chronic  ischemic infarct at the anterolateral aspect of the left frontal lobe.  No other perfusion defects. No evidence for acute ischemia.    IMPRESSION: Normal CT perfusion of the brain.      Radiation dose for this scan was reduced using automated exposure  control, adjustment of the mA and/or kV according to patient size, or  iterative reconstruction technique

## 2019-08-25 NOTE — CODE/RAPID RESPONSE
"[facility]    RRT Note  8/25/2019   Time Called: 0842    RRT called for: word finding difficulties     Assessment & Plan     Dustin Pearce is a 89 year old gentleman with insulin dependent diabetes, essential HTN, HL, anxiety and depression, prior TIA, recent admissions x2 for probable TIA vs seizure, chronic systolic CHF, severe 3-V CAD  with history of MODESTA to proximal LAD and proximal left circumflex, BPH, chronic left carotid artery occlusion, h/o of right carotid artery stenosis s/p right CEA admitted from [facility] ED with recurrence of word finding difficulties concerning for TIA v stroke. Patient had recurrence of word finding difficulties with right hand apraxia concerning for stroke.     INTERVENTIONS:  -stroke code   -neurology already consulted   -suspect the patient will need a brain MRI, as CTA head and neck already performed     At the end of the RRT patient going down for additional imaging     Discussed with and defer further cares to patient's PA and RN. Neurology to be notified with code stroke.     Interval History     Dustin Pearce is a 89 year old male who was admitted on 8/24/2019 for word finding difficulties concerning for TIA v stroke. Please see H&P and above for more details. The patient was observed as normal ~0800; however, around 0830 or so, the patient reported a worsening headache with inability to hold his fork (w/ right hand). The patient's RN also observed a mild right sided facial droop and word finding difficulties.     Medical history significant for: see above     Code Status: Full Code    Allergies   Allergies   Allergen Reactions     Oxycodone Other (See Comments)     \"TERRIBLE SWEATING\"     Sulfa Drugs      Tetracycline        Physical Exam   Vital Signs with Ranges:  Temp:  [96.1  F (35.6  C)-97.5  F (36.4  C)] 96.2  F (35.7  C)  Pulse:  [57-74] 74  Heart Rate:  [60-76] 68  Resp:  [16-20] 20  BP: (107-160)/(36-63) 153/57  SpO2:  [91 %-98 %] 94 %  I/O last " 3 completed shifts:  In: 240 [P.O.:240]  Out: 300 [Urine:300]    Gen: NAD, pleasant   HEENT: MMM, EOMI   Neck: supple, no posterior/anterior cervical LAD  Lungs: CTAB, no W/R/R, no accessory muscle use   CV: RRR, no M/G/R, no JVD   Abd: non tender, non distended, active bowel sounds x4   Ext: no clubbing/cyanosis/edema, +2 dorsalis pedis pulses bilaterally   MSK: no joint effusions or tenderness  Skin: no obvious bruising or open wounds   Neuro: AOx3, mild left sided facial droop present, sensation to light touch in tact throughout, no pronator drift, 5/5 UE/LE strength bilaterally, patient had difficulty following one and two step commands, slow to answer questions, speech was hesitant      Data     ABG:  -No lab results found in last 7 days.    Troponin:    Recent Labs   Lab Test 08/25/19  0620  05/27/18  0119   TROPI 0.018   < >  --    TROPONIN  --   --  0.00    < > = values in this interval not displayed.       IMAGING: (X-ray/CT/MRI)   Recent Results (from the past 24 hour(s))   CT Head w/o Contrast    Narrative    CT OF THE HEAD WITHOUT CONTRAST 8/24/2019 6:40 PM     COMPARISON: Brain MR 7/17/2019.    HISTORY: Word finding difficulties.    TECHNIQUE: 5 mm thick axial CT images of the head were acquired  without IV contrast material.    FINDINGS: There is moderate diffuse cerebral volume loss. There are  subtle patchy areas of decreased density in the cerebral white matter  bilaterally that are consistent with sequela of chronic small vessel  ischemic disease. Chronic encephalomalacia involving the anterolateral  aspect of the left frontal lobe and anterior aspect of the insula is  again noted without change.    The ventricles and basal cisterns are within normal limits in  configuration given the degree of cerebral volume loss.  There is no  midline shift. There are no extra-axial fluid collections.    No intracranial hemorrhage, mass or recent infarct.    The visualized paranasal sinuses are well-aerated.  There is no  mastoiditis. There are no fractures of the visualized bones.      Impression    IMPRESSION: Diffuse cerebral volume loss and cerebral white matter  changes consistent with chronic small vessel ischemic disease. No  evidence for acute intracranial pathology.  No change from the  comparison study.      Radiation dose for this scan was reduced using automated exposure  control, adjustment of the mA and/or kV according to patient size, or  iterative reconstruction technique    LISANDRO MAGALLON MD   CTA Head Neck with Contrast    Narrative    CT ANGIOGRAM OF THE HEAD AND NECK WITHOUT AND WITH CONTRAST  8/24/2019  6:45 PM     COMPARISON: Head and neck CT angiogram 7/16/2019.    HISTORY: Confusion. Word finding difficulties.    TECHNIQUE:  Precontrast localizing scans were followed by CT  angiography with an injection of 70 mL Isouve-370 nonionic intravenous  contrast material with scans through the head and neck.  Images were  transferred to a separate 3-D workstation where multiplanar  reformations and 3-D images were created.  Estimates of carotid  stenoses are made relative to the distal internal carotid artery  diameters except as noted.      FINDINGS:   Neck CTA: There is mild atherosclerotic narrowing at the origin of the  left common carotid artery. There is mild atherosclerotic narrowing of  the midportion of the left common carotid artery. The right common  carotid artery is patent without stenosis. Again noted is complete  occlusion of the left internal carotid artery from the origin to the  left carotid terminus. The right internal carotid artery is tortuous  but is patent without stenosis by NASCET criteria. The vertebral  arteries bilaterally are patent without stenosis.    Head CTA: There is calcified atherosclerotic plaque of the  intracranial distal right internal carotid artery that does not result  in any vascular narrowing. The intracranial left internal carotid  artery is occluded. Again  noted is irregularity in contour and mild  narrowing of the distal P2 segment of the left posterior cerebral  artery that is likely atherosclerotic in nature. The left posterior  cerebral artery and its branches are otherwise patent and  unremarkable. The basilar, bilateral anterior cerebral, bilateral  middle cerebral and right posterior cerebral arteries are patent and  unremarkable. The anterior communicating and bilateral posterior  communicating arteries are patent.      Impression    IMPRESSION:  1. Complete occlusion of the left internal carotid artery from the  origin to the left carotid terminus again noted.  2. Presumed mild atherosclerotic narrowing of the distal P2 segment of  the left posterior cerebral artery again noted.  3. Plaque without stenosis of the intracranial distal right internal  carotid artery again noted.  4. Otherwise, normal neck and head CTA.      Radiation dose for this scan was reduced using automated exposure  control, adjustment of the mA and/or kV according to patient size, or  iterative reconstruction technique    LISANDRO MAGALLON MD   CT Head w Contrast    Narrative    CT BRAIN PERFUSION 8/24/2019 6:55 PM    COMPARISON: None.    HISTORY: Word finding difficulties. Confusion.    TECHNIQUE: Time sequential axial CT images of the head were acquired  during the administration of intravenous contrast (50 mL [id number]-370).  CTA images of the [place] as well as color perfusion maps of  the brain were created from this time sequential axial source data.      Impression    IMPRESSION: There is decreased cerebral blood flow and decreased  cerebral blood volume within and at the margins of the chronic  ischemic infarct at the anterolateral aspect of the left frontal lobe.  No other perfusion defects. No evidence for acute ischemia.    IMPRESSION: Normal CT perfusion of the brain.      Radiation dose for this scan was reduced using automated exposure  control, adjustment of the mA  and/or kV according to patient size, or  iterative reconstruction technique    LISANDRO MAGALLON MD       CBC with Diff:  Recent Labs   Lab Test 08/24/19  2214 08/24/19  1825   WBC 6.7 7.2   HGB 12.1* 12.5*   MCV 90 90    175   INR  --  0.94        Lactic Acid:    Lab Results   Component Value Date    LACT 1.3 05/28/2018           Comprehensive Metabolic Panel:  Recent Labs   Lab 08/25/19  0620      POTASSIUM 3.9   CHLORIDE 108   CO2 31   ANIONGAP 2*   *   BUN 16   CR 0.73   GFRESTIMATED 81   GFRESTBLACK >90   ALLISON 9.0   PROTTOTAL 6.1*   ALBUMIN 3.0*   BILITOTAL 0.4   ALKPHOS 68   AST 15   ALT 13       INR:    Recent Labs   Lab Test 08/24/19  1825   INR 0.94       D-DIMER:  No results found for: DIMER    BNP:  No results found for: BNP    UA:  No results for input(s): COLOR, APPEARANCE, URINEGLC, URINEBILI, URINEKETONE, SG, UBLD, URINEPH, PROTEIN, UROBILINOGEN, NITRITE, LEUKEST, RBCU, WBCU in the last 168 hours.    Time Spent on this Encounter   I spent 20 minutes on the unit/floor managing the care of Dustin Pearce. Over 50% of my time was spent counseling the patient and/or coordinating care regarding services listed in this note.    Jesu Arevalo MD

## 2019-08-25 NOTE — PLAN OF CARE
OT: orders rec'd and nina reviewed. Pt admitted under observation status due to recurrence of word finding difficulties concerning for TIA v stroke. Patient had recurrence of word finding difficulties with right hand apraxia concerning for stroke and had a code stroke. PT to evaluate pt when appropriate first to screen for inpatient OT needs.

## 2019-08-25 NOTE — PROGRESS NOTES
PRIMARY DIAGNOSIS: TIA  OUTPATIENT/OBSERVATION GOALS TO BE MET BEFORE DISCHARGE:  1. Orthostatic performed: N/A     2. Diagnostic testing complete & at baseline neurologic testing: Yes. No further MRI this time since was done just  recently     3. Cleared by consultants (if involved): No. Neuro to see patient today     4. Interpretation of cardiac rhythm per telemetry tech: SR,BBB     5. Tolerating adequate PO diet and medications: Yes     6. Return to near baseline physical activity or neurologic status: Yes     Discharge Planner Nurse   Safe discharge environment identified: No  Barriers to discharge: Yes       Entered by: Lux Motta RN  Please review provider order for any additional goals.   Nurse to notify provider when observation goals have been met and patient is ready for discharge.

## 2019-08-25 NOTE — PROGRESS NOTES
0845: bedside RN reported return of word finding difficulties. Neuro exam conducted. Pt had troubling finding words. Unable to raise eye brows.  Loss of sensation on the face. RRT was called.     0900: code stroke was called.

## 2019-08-25 NOTE — PHARMACY-ADMISSION MEDICATION HISTORY
Admission medication history interview status for the 8/24/2019  admission is complete. See EPIC admission navigator for prior to admission medications     Medication history source reliability:Good    Actions taken by pharmacist (provider contacted, etc):None     Additional medication history information not noted on PTA med list :None    Medications added: acetaminophen, Systane eye drops    Medication reconciliation/reorder completed by provider prior to medication history? No    Time spent in this activity: 15 minutes    Prior to Admission medications    Medication Sig Last Dose Taking? Auth Provider   acetaminophen (TYLENOL) 500 MG tablet Take 500-1,000 mg by mouth every 6 hours as needed for mild pain  at PRN Yes Unknown, Entered By History   AMITRIPTYLINE HCL PO Take 25 mg by mouth nightly as needed for sleep  at PRN Yes Unknown, Entered By History   aspirin EC 81 MG EC tablet Take 1 tablet (81 mg) by mouth daily 8/24/2019 at am Yes Tra Reynoso MD   atorvastatin (LIPITOR) 20 MG tablet Take 2 tablets (40 mg) by mouth daily 8/24/2019 at am Yes Pat Garcia MD   bisacodyl (DULCOLAX) 10 MG Suppository Place 10 mg rectally daily as needed for constipation  at PRN Yes Reported, Patient   clopidogrel (PLAVIX) 75 MG tablet TAKE 1 TABLET BY MOUTH DAILY 8/24/2019 at am Yes Matt Morrissey MD   DULoxetine (CYMBALTA) 30 MG EC capsule TAKE 1 CAPSULE(30 MG) BY MOUTH DAILY 8/24/2019 at am Yes Matt Morrissey MD   insulin glargine (BASAGLAR KWIKPEN) 100 UNIT/ML pen Inject 26 Units Subcutaneous At Bedtime 8/23/2019 at Unknown time Yes Matt Morrissey MD   ipratropium (ATROVENT) 0.03 % nasal spray INHALE 1 SPRAY IN EACH NOSTRIL EVERY 12 HOURS AS NEEDED  at PRN Yes Matt Morrissey MD   levETIRAcetam (KEPPRA) 750 MG tablet Take 1 tablet (750 mg) by mouth 2 times daily 8/24/2019 at am Yes Pat Garcia MD   lisinopril (PRINIVIL/ZESTRIL) 2.5 MG tablet TAKE 1 TABLET(2.5 MG) BY MOUTH DAILY 8/24/2019 at  am Yes Matt Morrissey MD   MAGNESIUM-OXIDE 400 (241.3 Mg) MG tablet TAKE 1 TABLET BY MOUTH TWICE DAILY 8/24/2019 at am Yes Matt Morrissey MD   metFORMIN (GLUCOPHAGE) 1000 MG tablet Take 1 tablet (1,000 mg) by mouth 2 times daily (with meals) 8/24/2019 at am Yes Nils Ricks MD   metoprolol succinate ER (TOPROL-XL) 25 MG 24 hr tablet Take 12.5 mg by mouth At Bedtime 8/23/2019 at Unknown time Yes Unknown, Entered By History   nitroGLYcerin (NITROSTAT) 0.4 MG sublingual tablet For chest pain place 1 tablet under the tongue every 5 minutes for 3 doses. If symptoms persist 5 minutes after 1st dose call 911.  at PRN Yes Brandyn Graves PA   omeprazole (PRILOSEC) 40 MG DR capsule TAKE 1 CAPSULE(40 MG) BY MOUTH DAILY 30 TO 60 MINUTES BEFORE A MEAL 8/24/2019 at am Yes Matt Morrissey MD   polyethylene glycol (MIRALAX/GLYCOLAX) Packet Take 17 g by mouth daily as needed for constipation  at PRN Yes Reported, Patient   polyethylene glycol-propylene glycol (SYSTANE ULTRA) 0.4-0.3 % SOLN ophthalmic solution Place 2 drops into both eyes daily as needed for dry eyes  at PRN Yes Unknown, Entered By History   tamsulosin (FLOMAX) 0.4 MG capsule Take 0.4 mg by mouth At Bedtime 8/23/2019 at Unknown time Yes Unknown, Entered By History   blood glucose monitoring (NO BRAND SPECIFIED) meter device kit Use to test blood sugar bid times daily or as directed.   Matt Morrissey MD   blood glucose monitoring (TRUE METRIX BLOOD GLUCOSE TEST) test strip USE TO TEST BLOOD SUGAR THREE TIMES DAILY OR AS DIRECTED   Matt Morrissey MD   order for DME Equipment being ordered: True Matrix Blood Glucose meter.   Matt Morrissey MD

## 2019-08-25 NOTE — PLAN OF CARE
PRIMARY DIAGNOSIS: TIA  OUTPATIENT/OBSERVATION GOALS TO BE MET BEFORE DISCHARGE:  1. Orthostatic performed: N/A    2. Diagnostic testing complete & at baseline neurologic testing: Yes    3. Cleared by consultants (if involved): No    4. Interpretation of cardiac rhythm per telemetry tech: SR,BBB    5. Tolerating adequate PO diet and medications: Yes    6. Return to near baseline physical activity or neurologic status: Yes    Discharge Planner Nurse   Safe discharge environment identified: No  Barriers to discharge: Yes       Entered by: Gloria Simmons 08/25/2019 6:25 AM     Please review provider order for any additional goals.   Nurse to notify provider when observation goals have been met and patient is ready for discharge.    Disoriented to year. Neuros - forgetful, baseline Sun'aq (hearing aids in place). Glasses at bedside. VSS. Tele SR, BBB. Mod CHO diet with thin liquids. Takes pills whole. Up with A1, cane. Denies pain. Pt scoring green on the Aggression Stop Light Tool. Plan for PT/OT/SLP.

## 2019-08-25 NOTE — CONSULTS
[facility]    Stroke Consult Note    Reason for Consult:  Episode of garbled speech     Chief Complaint: Garbled Speech       HPI  Dustin Pearce is a 89 year old male PMHx of DM, HTN, HL, anxiety and depression, prior TIA, and other recent admissions X 2 for probable TIA vs seizure, CHF, severe 3V CAD, chronic L carotid artery occlusion, and h/o of R CEA, brought to ED last night for slurred and garbaled speech which resolved. He has previously had 2 TIAs of similar time. This morning, patient had another episode of garbled speech.  He has had previously complete workups including vessel imaging, 2 MRIs, EEg and was placed on keppra for seizure prophylaxis.  We discussed w/ patients wife who was at bedside, that he is already medically maximized, he is a on ASA 81, plavix 75mg and also lipitor 40. Patient also has had a ziopatch for cardiac monitoring which has yet to be read. As for as chart review shows, no episode of afib/flutter have been noted during his previous admissions.           Impression  Dustin Pearce is a 89 year old male PMHx of DM, HTN, HL, anxiety and depression, prior TIA, and other recent admissions X 2 for probable TIA vs seizure, CHF, severe 3V CAD, chronic L carotid artery occlusion, and h/o of R CEA, brought to ED last night for repeated 30 min -1 hour episodes of garbled speech.  Patient has had a thorough workup multiple times in past few months including MRI,EEG and vessel imaging, ziopatch for cardiac monitoring which has yet to be read . Medically maximized on on ASA 81, plavix 75mg and also lipitor 40. As for as chart review shows, no episode of afib/flutter have been noted during his previous admissions.     NIHSS of 5, not tpa candidate as had recent TIAs , no large vessel occlusion suspected given exam finding    Recommendations  Acute Ischemic Stroke Recommendations  - Labetalol PRN to maintain SBP < 220 from stroke perspective  [] F/u P2Y12  [] CTA h and neck:    - Complete occlusion of the left internal carotid artery from the  origin to the left carotid terminus; mild atherosclerotic narrowing of the distal P2 segment of  the left posterior cerebral artery again noted.  [] CTP: shows chronic left parietal diffusion restriction which was also seen on previous perfusion scans  [] MRI Stroke Protocol 4/8, 7/1, 7/17 : neg for acute infarcts  [] Cont home ASA 81mg qday, plavix 75 mg qday, lipitor 40 mg qday  - Telemetry, EKG  - Bedside Glucose Monitoring  - A1c, Troponin x 3  - PT/OT/SLP  - PM&R  - Stroke Education  - Euthermia, Euglycemia     Thank you for this consult.   The Stroke Staff is Dr. Fajardo.     Elda Ventura MD  Vascular Neurology Fellow  Pager:  [phone]    _____________________________________________________    Past Medical History   Past Medical History:   Diagnosis Date     Abdominal aortic aneurysm (H) 12/25/2016     Acute on chronic systolic congestive heart failure (H) 6/7/2018     Anemia 6/7/2018     Anemia due to blood loss, acute 6/13/2017     Aortic stenosis     mild     Benign essential hypertension 1/6/2017     Benign non-nodular prostatic hyperplasia with lower urinary tract symptoms 10/20/2016     CAD (coronary artery disease)     MI 1970     Carotid artery stenosis 10/20/2016    chronically occluded left internal carotid artery     Cerebrovascular accident (H) 10/20/2016     Cognitive impairment 6/7/2018     Coronary artery disease of native artery of native heart with stable angina pectoris (H) 10/20/2016    MI 1970     Depression, unspecified depression type 2/22/2018     Diabetes mellitus (H)      Diabetic ulcer of left foot associated with diabetes mellitus due to underlying condition (H) 6/12/2017     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     Gastro-oesophageal reflux disease      Gastroesophageal reflux disease without esophagitis 10/20/2016     Heart attack (H)      Hyperlipidemia      Hyperlipidemia, unspecified  hyperlipidemia type 10/20/2016     Ischemic cardiomyopathy      Lumbar back pain 12/30/2016     Mild cognitive impairment 10/20/2016     Nephrolithiasis     RECURRENT     NSTEMI (non-ST elevated myocardial infarction) (H) 6/7/2018     Osteopenia      PAD (peripheral artery disease) (H)     peripheral angiogram 5/2017: The common iliac artery stenoses were stented and the superficial femoral artery stenoses were angioplastied     Paroxysmal atrial fibrillation (H)      Peripheral artery disease (H)     peripheral angiogram 5/2017: The common iliac artery stenoses were stented and the superficial femoral artery stenoses were angioplastied     RBBB with left anterior fascicular block      Slow transit constipation 2/22/2018     Stroke of unknown etiology (H) 12/31/2017     Syncope      TIA (transient ischemic attack) 1/20/2017     Unspecified cerebral artery occlusion with cerebral infarction      UTI (urinary tract infection) due to Enterococcus 2/22/2018     Ventricular tachycardia (H)     nonsustained     Past Surgical History   Past Surgical History:   Procedure Laterality Date     AMPUTATE FOOT Left 6/4/2017    Procedure: AMPUTATE FOOT;  Sun Add#3: partial left foot amputation - Sagital Saw - Mini C-Arm;  Surgeon: Moe Kirk DPM;  Location:  OR     CHOLECYSTECTOMY       ENDARTERECTOMY CAROTID Right 1/3/2018    Procedure: ENDARTERECTOMY CAROTID;  RIGHT CAROTID ENDARTERECTOMY WITH EEG;  Surgeon: Roland Rodriguez MD;  Location:  OR     ESOPHAGOSCOPY, GASTROSCOPY, DUODENOSCOPY (EGD), COMBINED N/A 12/26/2016    Procedure: COMBINED ESOPHAGOSCOPY, GASTROSCOPY, DUODENOSCOPY (EGD);  Surgeon: Nasim Louis MD;  Location:  GI     GENITOURINARY SURGERY      KIDNEY STONE REMOVAL X 4     HC UGI ENDOSCOPY W EUS N/A 12/29/2016    Procedure: COMBINED ENDOSCOPIC ULTRASOUND, ESOPHAGOSCOPY, GASTROSCOPY, DUODENOSCOPY (EGD);  Surgeon: Nasim Louis MD;  Location:  GI     HERNIA REPAIR        INCISION AND DRAINAGE FOOT, COMBINED Left 6/6/2017    Procedure: COMBINED INCISION AND DRAINAGE FOOT;  REVISIONAL LEFT FOOT INCISION AND DRAINAGE WITH POSSIBLE FLAP CLOSURE , ANTIBIOTIC SOLUTION ;  Surgeon: Nabil Ann DPM;  Location:  OR     LASER HOLMIUM LITHOTRIPSY URETER(S), INSERT STENT, COMBINED  3/2/2012    Procedure:COMBINED CYSTOSCOPY, URETEROSCOPY, LASER HOLMIUM LITHOTRIPSY URETER(S), INSERT STENT; CYSTOSCOPY, RIGHT RETROGRADES, RIGHT URETEROSCOPY, HOLMIUM LASER, RIGHT STENT PLACEMENT; Surgeon:ANJELICA LEÓN; Location: OR     ROTATOR CUFF REPAIR RT/LT       Medications   Home Meds  Prior to Admission medications    Medication Sig Start Date End Date Taking? Authorizing Provider   acetaminophen (TYLENOL) 500 MG tablet Take 500-1,000 mg by mouth every 6 hours as needed for mild pain   Yes Unknown, Entered By History   AMITRIPTYLINE HCL PO Take 25 mg by mouth nightly as needed for sleep   Yes Unknown, Entered By History   aspirin EC 81 MG EC tablet Take 1 tablet (81 mg) by mouth daily 1/21/17  Yes Tra Reynoso MD   atorvastatin (LIPITOR) 20 MG tablet Take 2 tablets (40 mg) by mouth daily 7/19/19  Yes Pat Garcia MD   bisacodyl (DULCOLAX) 10 MG Suppository Place 10 mg rectally daily as needed for constipation   Yes Reported, Patient   clopidogrel (PLAVIX) 75 MG tablet TAKE 1 TABLET BY MOUTH DAILY 5/29/19  Yes Matt Morrissey MD   DULoxetine (CYMBALTA) 30 MG EC capsule TAKE 1 CAPSULE(30 MG) BY MOUTH DAILY 10/1/18  Yes Matt Morrissey MD   insulin glargine (BASAGLAR KWIKPEN) 100 UNIT/ML pen Inject 26 Units Subcutaneous At Bedtime 5/30/19  Yes Matt Morrissey MD   ipratropium (ATROVENT) 0.03 % nasal spray INHALE 1 SPRAY IN EACH NOSTRIL EVERY 12 HOURS AS NEEDED 5/6/19  Yes Matt Morrissey MD   levETIRAcetam (KEPPRA) 750 MG tablet Take 1 tablet (750 mg) by mouth 2 times daily 7/19/19  Yes Pat Garcia MD   lisinopril (PRINIVIL/ZESTRIL) 2.5 MG tablet TAKE 1  TABLET(2.5 MG) BY MOUTH DAILY 8/6/19  Yes Matt Morrissey MD   MAGNESIUM-OXIDE 400 (241.3 Mg) MG tablet TAKE 1 TABLET BY MOUTH TWICE DAILY 8/14/19  Yes Matt Morrissey MD   metFORMIN (GLUCOPHAGE) 1000 MG tablet Take 1 tablet (1,000 mg) by mouth 2 times daily (with meals) 7/3/19  Yes Nils Ricks MD   metoprolol succinate ER (TOPROL-XL) 25 MG 24 hr tablet Take 12.5 mg by mouth At Bedtime   Yes Unknown, Entered By History   nitroGLYcerin (NITROSTAT) 0.4 MG sublingual tablet For chest pain place 1 tablet under the tongue every 5 minutes for 3 doses. If symptoms persist 5 minutes after 1st dose call 911. 5/31/18  Yes Brandyn Graves PA   omeprazole (PRILOSEC) 40 MG DR capsule TAKE 1 CAPSULE(40 MG) BY MOUTH DAILY 30 TO 60 MINUTES BEFORE A MEAL 1/11/19  Yes Matt Morrissey MD   polyethylene glycol (MIRALAX/GLYCOLAX) Packet Take 17 g by mouth daily as needed for constipation 1/11/18  Yes Reported, Patient   polyethylene glycol-propylene glycol (SYSTANE ULTRA) 0.4-0.3 % SOLN ophthalmic solution Place 2 drops into both eyes daily as needed for dry eyes   Yes Unknown, Entered By History   tamsulosin (FLOMAX) 0.4 MG capsule Take 0.4 mg by mouth At Bedtime   Yes Unknown, Entered By History   blood glucose monitoring (NO BRAND SPECIFIED) meter device kit Use to test blood sugar bid times daily or as directed. 11/29/18   Matt Morrissey MD   blood glucose monitoring (TRUE METRIX BLOOD GLUCOSE TEST) test strip USE TO TEST BLOOD SUGAR THREE TIMES DAILY OR AS DIRECTED 6/6/18   Matt Morrissey MD   order for DME Equipment being ordered: True Matrix Blood Glucose meter. 6/8/18   Matt Morrissey MD       Scheduled Meds    aspirin  81 mg Oral Daily     atorvastatin  40 mg Oral Daily     clopidogrel  75 mg Oral Daily     DULoxetine  30 mg Oral Daily     insulin aspart  1-7 Units Subcutaneous TID AC     insulin aspart  1-5 Units Subcutaneous At Bedtime     insulin glargine  14 Units Subcutaneous At Bedtime      "levETIRAcetam  750 mg Oral BID     magnesium oxide  400 mg Oral BID     omeprazole  20 mg Oral QAM     tamsulosin  0.4 mg Oral At Bedtime       Infusion Meds    - MEDICATION INSTRUCTIONS -       sodium chloride 75 mL/hr at 19 2224       PRN Meds  acetaminophen, acetaminophen, amitriptyline, bisacodyl, glucose **OR** dextrose **OR** glucagon, hydrALAZINE, labetalol, - MEDICATION INSTRUCTIONS -, melatonin, naloxone, nitroGLYcerin, ondansetron **OR** ondansetron, polyethylene glycol, polyethylene glycol-propylene glycol    Allergies   Allergies   Allergen Reactions     Oxycodone Other (See Comments)     \"TERRIBLE SWEATING\"     Sulfa Drugs      Tetracycline      Family History   Family History   Problem Relation Age of Onset     Breast Cancer Mother      Heart Failure Father 81     Social History   Social History     Tobacco Use     Smoking status: Former Smoker     Packs/day: 1.00     Years: 30.00     Pack years: 30.00     Types: Cigarettes     Start date:      Last attempt to quit: 1999     Years since quittin.6     Smokeless tobacco: Never Used   Substance Use Topics     Alcohol use: Not Currently     Alcohol/week: 4.2 oz     Types: 7 Standard drinks or equivalent per week     Comment: 1 drink per biweekly     Drug use: No       Review of Systems   The 10 point Review of Systems is negative other than noted in the HPI or here.        PHYSICAL EXAMINATION   Temp:  [96.1  F (35.6  C)-97.5  F (36.4  C)] 96.2  F (35.7  C)  Pulse:  [57-74] 74  Heart Rate:  [60-76] 68  Resp:  [16-20] 20  BP: (107-160)/(36-63) 153/57  SpO2:  [91 %-98 %] 94 %    Neurologic  Mental Status:  Alert, aphasic. Unable to repeat, Naming is very poor, able to follow simple commands such as close your eyes, and squeeze my hand.   Cranial Nerves:  visual fields intact, PERRL, EOMI with normal smooth pursuit, facial sensation intact and symmetric,mild left nasolabial fold flattening, hearing not formally tested but intact to " conversation, palate elevation symmetric and uvula midline, no dysarthria, shoulder shrug strong bilaterally, tongue protrusion midline  Motor:   5/5 in all 4 ext  Reflexes:  toes down-going  Sensory:  light touch sensation intact and symmetric throughout upper and lower extremities, no extinction on double simultaneous stimulation   Coordination:  normal finger-to-nose and heel-to-shin bilaterally without dysmetria, rapid alternating movements symmetric  Station/Gait:  deferred         Labs Data   CBC  Recent Labs   Lab 08/24/19  2214 08/24/19  1825   WBC 6.7 7.2   RBC 4.21* 4.45   HGB 12.1* 12.5*   HCT 37.8* 40.0    175     Basic Metabolic Panel   Recent Labs   Lab 08/25/19  0620 08/24/19  2214 08/24/19  1825    141 141   POTASSIUM 3.9 4.9 4.7   CHLORIDE 108 105 105   CO2 31 32 31   BUN 16 17 19   CR 0.73 0.74 0.86   * 112* 133*   ALLISON 9.0 9.7 9.8     Liver Panel  Recent Labs   Lab Test 08/25/19  0620 07/17/19  1548 06/01/18  0810   PROTTOTAL 6.1* 6.5* 6.6*   ALBUMIN 3.0* 3.2* 2.9*   BILITOTAL 0.4 0.5 0.6   ALKPHOS 68 83 67   AST 15 17 15   ALT 13 18 14     INR  Recent Labs   Lab Test 08/24/19  1825 07/16/19  1739 06/30/19  2345   INR 0.94 0.98 1.01      Lipid Profile  Recent Labs   Lab Test 08/25/19  0620 04/09/19  0626 02/28/19  1913 12/31/17  1425  12/18/15 12/05/14 11/22/13   CHOL 118 134  --  133   < > 198 160 151   HDL 43 47  --  43   < > 39* 48 41   LDL 49 64 87 61   < > 123 82 71   TRIG 130 113  --  147   < > 181* 149 197*   CHOLHDLRATIO  --   --   --   --   --  5.1* 3.3 3.7    < > = values in this interval not displayed.     A1C  Recent Labs   Lab Test 07/01/19  0607 05/30/19  1854 02/28/19 1913   A1C 8.3* 8.8* 8.7*     Troponin I  Recent Labs   Lab 08/25/19  0620 08/24/19  2214   TROPI 0.018 <0.015       Dysphagia Screen  Per Nursing     Stroke Scales     NIHSS  Interval     Interval Comments     1a. Level of Consciousness 0-->Alert, keenly responsive   1b. LOC Questions 2-->Answers  neither question correctly   1c. LOC Commands 0-->Performs two task correctly   2.   Best Gaze 0-->Normal   3.   Visual 0-->No visual loss   4.   Facial Palsy 1-->Normal symmetrical movements   5a. Motor Arm, Left 0-->No drift, limb holds 90 (or 45) degrees for full 10 secs   5b. Motor Arm, Right 0-->No drift, limb holds 90 (or 45) degrees for full 10 secs   6a. Motor Leg, Left 0-->No drift, leg holds 30 degree position for full 5 secs   6b. Motor Leg, right 0-->No drift, leg holds 30 degree position for full 5 secs   7.   Limb Ataxia 0-->Absent   8.   Sensory 0-->Normal, no sensory loss   9.   Best Language 2-->Mild-to-moderate aphasia, some obvious loss of fluency or facility of comprehension, without significant limitation on ideas expressed or form of expression. Reduction of speech and/or comprehension, however, makes conversation. . . (see row details)   10. Dysarthria 0-->Normal   11. Extinction and Inattention  0-->No abnormality   Total 5

## 2019-08-25 NOTE — PLAN OF CARE
PRIMARY DIAGNOSIS: TIA  OUTPATIENT/OBSERVATION GOALS TO BE MET BEFORE DISCHARGE:  1. Orthostatic performed: No    2. Diagnostic testing complete & at baseline neurologic testing: No    3. Cleared by consultants (if involved): No    4. Interpretation of cardiac rhythm per telemetry tech: SR with BBB occasionally PVC's    5. Tolerating adequate PO diet and medications: Yes    6. Return to near baseline physical activity or neurologic status: Yes    Discharge Planner Nurse   Safe discharge environment identified: No  Barriers to discharge: Yes       Entered by: Shanika Wilcox 08/25/2019 12:18 AM     Please review provider order for any additional goals.   Nurse to notify provider when observation goals have been met and patient is ready for discharge.  Pt is A&O x3, disoriented to time, forgetful. [place] has hearing aids radu in place. 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 NIH score 1. Denies numbness. Tele is SR with BBB occasionally PVC's. No c/o nausea, chest pain or SOB.  Passed bedside swallow test. . Behind right ear bandaid from skin procedure done Wednesday 08/21. Ambulates with cane to restroom. Neurology consult scheduled, PT/OT and speech consult for tomorrow. Will continue to monitor.

## 2019-08-25 NOTE — PLAN OF CARE
Discharge Planner SLP   Patient plan for discharge: Patient did not state  Current status: Patient seen for bedside swallow evaluation due to slurred speech with concern for TIA. Patient presents with mild oral-pharyngeal dysphagia secondary to hx of dysphagia and slurred speech. Per chart review, most recently on DDL2 with thin liquids (7/2019) with VFSS completed. Wife and pt report no recent difficulties with a regular diet and thin liquids. Pt tolerated single sips of thin liquid from the cup. With sequential and large boluses, pt demonstrated delayed cough 1x and throat clear 1x. Patient consumed large puree boluses with no overt difficulties. Pt consumed solid textures with adequate mastication and appropriate oral-motor skills for safe consumption.     Recommend regular diet with thin liquids. Swallow strategies: single sips, upright, and alternate consistencies. SLP to sign off as pt is admitted for observation and tolerating baseline diet. Wife and pt reporting that speech is slowly improving, educated that if they desire OP speech therapy services would be most appropriate for speech/language deficits.     Barriers to return to prior living situation: None from ST standpoint  Recommendations for discharge: Return to prior living arrangement  Rationale for recommendations: No further ST needs as patient is tolerating baseline diet. Discussed seeking OP speech/language tx if speech deficits persist.       Entered by: Lluvia Santillan 08/25/2019 12:36 PM

## 2019-08-25 NOTE — PROGRESS NOTES
PRIMARY DIAGNOSIS: TIA  OUTPATIENT/OBSERVATION GOALS TO BE MET BEFORE DISCHARGE:  1. Orthostatic performed: N/A     2. Diagnostic testing complete & at baseline neurologic testing: Partially Met. No further MRI this time since was done just  recently. Lab in AM     3. Cleared by consultants (if involved): No. Neuro and OT saw patient today. Swallow eval was also done.     4. Interpretation of cardiac rhythm per telemetry tech: SR,BBB     5. Tolerating adequate PO diet and medications: Yes     6. Return to near baseline physical activity or neurologic status: Inconsistent     Discharge Planner Nurse   Safe discharge environment identified: No  Barriers to discharge: Yes       Entered by: Lux Motta RN  Please review provider order for any additional goals.   Nurse to notify provider when observation goals have been met and patient is ready for discharge.

## 2019-08-25 NOTE — PROGRESS NOTES
"   08/25/19 1200   General Information   Onset Date 08/25/19   Start of Care Date 08/25/19   Referring Physician Dr. Queenie MD   Patient Profile Review/OT: Additional Occupational Profile Info See Profile for full history and prior level of function   Patient/Family Goals Statement Patient did not state   Swallowing Evaluation Bedside swallow evaluation   Behaviorial Observations WNL (within normal limits)   Mode of current nutrition Oral diet   Type of oral diet Regular;Thin liquid;Other (Comment)  (Mod Cho)   Respiratory Status Room air   Comments Per MD note: \"Mr. Dustin Pearce is a 89-year-old male with past medical history significant for IDDM, HTN, HL, anxiety and depression, prior TIA, recent admissions x2 for probable TIA vs seizure, chronic systolic CHF, severe 3-V CAD  with history of MODESTA to proximal LAD and proximal left circumflex, BPH, chronic left carotid artery occlusion, h/o of right CEA. He was brought to the ER by his wife for evaluation of slurred speech. \"    Swallowing: Hx of video swallow study 12/2016 with mild-min dysphagia found, regular diet and thin liquids recommended.  12/2017 clinical eval and tx completed with DDL3 and thin recommended, no straw.  Most recent admission for seizures 7/2019 with VFSS completed and eventually diet was advanced to DDL2 with thin liquids (mental status impacting diet at the time). Wife and pt report no recent difficulties with a regular diet and thin liquids except very infrequent cough at times (no pneumonia).    Clinical Swallow Evaluation   Oral Musculature generally intact   Structural Abnormalities none present   Dentition present and adequate   Mucosal Quality good   Mandibular Strength and Mobility intact   Oral Labial Strength and Mobility WFL   Lingual Strength and Mobility WFL   Velar Elevation intact   Buccal Strength and Mobility intact   Laryngeal Function Swallow;Voicing initiated;Dry swallow palpated   Additional Documentation Yes "   Additional evaluation(s) completed today No   Clinical Swallow Eval: Thin Liquid Texture Trial   Mode of Presentation, Thin Liquids straw;cup;self-fed   Volume of Liquid or Food Presented 3 oz   Oral Phase of Swallow Premature pharyngeal entry   Pharyngeal Phase of Swallow coughing/choking   Diagnostic Statement No overt s/sx of aspiration with single sips from the cup, with sequential and large boluses pt demonstrated delayed cough   Clinical Swallow Eval: Puree Solid Texture Trial   Mode of Presentation, Puree spoon;self-fed   Volume of Puree Presented 5 tablespoons   Oral Phase, Puree WFL   Pharyngeal Phase, Puree intact   Diagnostic Statement Large bites, no overt difficulties   Clinical Swallow Eval: Solid Food Texture Trial   Mode of Presentation, Solid self-fed   Volume of Solid Food Presented 1/4 cracker   Oral Phase, Solid WFL   Pharyngeal Phase, Solid intact   Diagnostic Statement Adequate mastication, liquid rinse to clear residue, no difficulties   Swallow Compensations   Swallow Compensations Pacing;Reduce amounts;Alternate viscosity of consistencies   Results No difficulties noted   Esophageal Phase of Swallow   Patient reports or presents with symptoms of esophageal dysphagia Yes   Esophageal comments GERD, continue medication   Swallow Eval: Clinical Impressions   Skilled Criteria for Therapy Intervention No problems identified which require skilled intervention   Functional Assessment Scale (FAS) 6   Treatment Diagnosis Minimal oral pharyngeal dysphagia    Diet texture recommendations Regular diet;Thin liquids   Recommended Feeding/Eating Techniques small sips/bites;maintain upright posture during/after eating for 30 mins;alternate between small bites and sips of food/liquid   Anticipated Discharge Disposition home w/ assist   Risks and Benefits of Treatment have been explained. Yes   Patient, family and/or staff in agreement with Plan of Care Yes   Clinical Impression Comments Patient seen for  bedside swallow evaluation due to slurred speech with concern for TIA. Patient presents with mild oral-pharyngeal dysphagia secondary to hx of dysphagia and slurred speech. Per chart review, most recently on DDL2 with thin liquids (7/2019) with VFSS completed. Wife and pt report no recent difficulties with a regular diet and thin liquids. Pt tolerated single sips of thin liquid from the cup. With sequential and large boluses, pt demonstrated delayed cough 1x and throat clear 1x. Patient consumed large puree boluses with no overt difficulties. Pt consumed solid textures with adequate mastication and appropriate oral-motor skills for safe consumption. Recommend regular diet with thin liquids. Swallow strategies: single sips, upright, and alternate consistencies. SLP to sign off as pt is admitted for observation and tolerating baseline diet. Wife and pt reporting that speech is slowly improving, educated that if they chose OP speech therapy services would be most appropriate for speech/language deficits.    Total Evaluation Time   Total Evaluation Time (Minutes) 30

## 2019-08-25 NOTE — PLAN OF CARE
PT: PT orders received, initial eval completed and treatment initiated. Patient lives with his wife/sig other in a house with 5 stairs to access his main floor. Previously independent with ADL and mobility using a SEC in the house and a FWW In the community. Pt presented with slurred speech. Admitted under OBS status with possible TIA. PMH significant for TIA and CVAs, seizure disorder, L ICA occlusion, s/p CEA for R carotid, HTN, IDDM, MODESTA x 2. RRT and code stroke called this morning due to neuro changes, de-escalated.    Discharge Planner PT   Patient plan for discharge: home  Current status: Pt requires CGA for bed mobility and transfers with his own SEC. Gait training with shoes donned and SEC x 100' with Oni to correct 2 LOB. Pt is willing to try a FWW for gait but wife expressed concern about using the FWW in the home reporting that the walker is too cumbersome and he does better with the SEC. Pt declined trial of stairs due to fatigue.  Barriers to return to prior living situation: level of assist required, fall risk, alone for periods of time, weakness  Recommendations for discharge: home with assist for all mobility, OPPT  Rationale for recommendations: Pt currently is not safe to be mobilizing alone at home at this time. Pt and wife are hopeful that with a good night's sleep and one more PT session that his mobility will improve to allow pt to return home safely. Pt and wife prefer OPPT instead of home PT to improve balance and mobility. If pt continues to require assist for mobility after one more session, he will either need increased assist at home or possible TCU stay.       Entered by: Zohreh Anderson 08/25/2019 4:19 PM

## 2019-08-25 NOTE — PLAN OF CARE
PT:  Orders received and chart reviewed. Per chart review and discussion during IDT rounds, patient with RRT/Code stroke this AM. Will hold PT/OT evaluation this AM. Discussed with RN.

## 2019-08-25 NOTE — PROGRESS NOTES
End of Shift Report:  Dustin Pearce is alert, oriented x 4 but labile.  Had one episode of garbled speech this morning with decreased sensation on the left face and a (?) left facial droop.  RRT and later Code Stroke were done.  Neuro consult done and no further diagnostic exam to be done because MRI and CT were just done recently which showed chronic diseases with no option for intervention. . Vital Signs are stable  and is up with stand by assist.  Pain Headache. Tylenol a total of 1 g given with relief. Plavix was dc and Ticagrelor is started.

## 2019-08-25 NOTE — ED NOTES
"[facility]  ED Nurse Handoff Report    ED Chief complaint: Garbled Speech      ED Diagnosis:   Final diagnoses:   Episode of change in speech       Code Status: Full Code    Allergies:   Allergies   Allergen Reactions     Oxycodone Other (See Comments)     \"TERRIBLE SWEATING\"     Sulfa Drugs      Tetracycline        Activity level - Baseline/Home: Independent   Activity Level - Current:   Stand with Assist of 2    Patient's Preferred language: english   Needed?: No    Isolation: No  Infection: Not Applicable  Bariatric?: No    Vital Signs:   Vitals:    08/24/19 1817 08/24/19 1849   BP: (!) 140/58 107/63   Pulse: 57    Resp: 16 18   Temp: 97.5  F (36.4  C)    TempSrc: Oral    SpO2: 97% 96%   Weight: 76.2 kg (168 lb)    Height: 1.676 m (5' 6\")        Cardiac Rhythm: ,        Pain level:      Is this patient confused?: Yes   Does this patient have a guardian?  No         If yes, is there guardianship documents in the Epic \"Code/ACP\" activity?  N/A         Guardian Notified?  N/A  [place] - Suicide Severity Rating Scale Completed?  Yes  If yes, what color did the patient score?  White    Patient Report: Initial Complaint: pt had garbled speech around 1800 and dizziness throughout the day. Hx of possible seizure/TIA in the past (pt on keppra). Code stroke initiated and de-escalated.   Focused Assessment: pt has clear speech at this time with facial symmetry. Pt seems to be more weak to the right extremities. Pt still disoriented to time and situation.   Tests Performed: CT, labs  Abnormal Results: n/a  Treatments provided: n/a    Family Comments: wife at bedside    OBS brochure/video discussed/provided to patient/family: yes              Name of person given brochure if not patient: n/a              Relationship to patient: n/a    ED Medications:   Medications   iopamidol (ISOVUE-370) solution 120 mL (120 mLs Intravenous Given 8/24/19 1842)   sodium chloride (PF) 0.9% PF flush 100 mL (100 mLs " Intravenous Given 8/24/19 5043)       Drips infusing?:  No    For the majority of the shift this patient was Yellow.   Interventions performed were reassurance and information.    Severe Sepsis OR Septic Shock Diagnosis Present: No    To be done/followed up on inpatient unit:  n/a    ED NURSE PHONE NUMBER: *[phone]

## 2019-08-25 NOTE — PROGRESS NOTES
RECEIVING UNIT ED HANDOFF REVIEW    ED Nurse Handoff Report was reviewed by: Shanika Wilcox RN on August 24, 2019 at 8:35 PM

## 2019-08-25 NOTE — PROGRESS NOTES
[facility]    Medicine Progress Note - Hospitalist Service       Date of Admission:  8/24/2019  Assessment & Plan   Mr. Dustin Pearce is a 89-year-old male with PMH significant for IDDM, HTN, HL, anxiety and depression, prior TIA, recent admissions x2 for probable TIA vs seizure, chronic systolic CHF, severe 3-V CAD  with history of MODESTA to proximal LAD and proximal left circumflex, BPH, chronic left carotid artery occlusion, h/o of right CEA. He was brought to the ER by his wife for evaluation of slurred speech and admitted to the observation unit on 8/24/19 for further monitoring.     1.  Slurred speech, suspect TIA   2.  History of TIAs and CVA in the past  3.  History of seizure disorder  4.  Chronic left ICA occlusion  5.  History of right carotid stenosis, status post CEA  Similar presentation on 7/16.  Work-up was negative at that time.  PTA aspirin and Plavix were continued and Keppra was increased to 750 mg twice daily from 500 mg twice daily. Today's presenting symptom, slurring of speech has completely resolved, though his wife feels he is otherwise not back to his baseline.  CT head perfusion study negative, CTA with chronic left ICA occlusion.  Neurology consulted from ER.   - Continue PTA aspirin, statin.  - TTE with bubble was negative a month ago, at this point defer further work-up including echo/MRI/EEG to neurology.  - PT OT SLP evals, moderate carb diet when clears swallow evaluation.  - Continue PTA Keppra.  - Code stroke this AM due to Lt facial paresthesias, garbled speech with delayed response to following commands, and difficulty holding fork with Rt hand. Neuro presented to bedside and de-escalated code stroke due to pt already completing TIA workup in the past 2 months, with 2 recent brain MRIs, CT imaging last night, and medically optimized.  - D/w Neuro, check P2Y12 to ensure Plavix is effective in this pt, could consider changing to Brilinta if abnormalities with lab  test, he is not a great candidate for anticoagulation such as warfarin due to his fall risk as the patient is 89 yo, and could also consider an implant loop recorder.   - P2Y12 level 227 --> d/w neuro, will change to Brilinta, d/w pharmacist Rachel and will give loading dose 180mg x1 now and start 90mg BID 24' after last plavix dose ~0800  - d/w family, try Brilinta, they will discuss and decide about implantable loop recorder. Given dementia, he would likely remove any devices on his chest if placed other than implantable. May be worth just trying Brilinta as we have not captured any arrhythmias on the monitor. Expect discharge tomorrow if tolerates Brilinta well and no new symptoms or events on telemetry. Need to decide about implantable loop recorder.  - due to recurrent symptomatic TIA episodes, I feel this patient meets inpatient criteria and should be monitored in an inpatient bed since he had acute TIA episodes this morning. We are also changing to brilinta as above. I have d/w Dr. Quevedo of utilization review who agrees.     6.   IDDM: HbA1c 8.3 about 2 months ago.  - Cleared for diet per SLP, increase insulin from 14 units nightly to 18 units nightly  - Will hold PTA metformin   - Will place him on sliding scale insulin.      Recent Labs   Lab 08/25/19  1334 08/25/19  1157 08/25/19  0914 08/25/19  0813 08/25/19  0620 08/25/19  0138 08/24/19  2214 08/24/19  2200 08/24/19  1825   GLC  --   --   --   --  103*  --  112*  --  133*   * 192* 158* 108*  --  153*  --  104*  --         7.  Hypertension.    - Hold PTA lisinopril and Toprol to allow some permissive hypertension.   - Labetalol and hydralazine p.r.n. available     8.  Dyslipidemia: Continue PTA atorvastatin.      9.  Seizure disorder: Resume PTA Keppra 750 mg p.o. twice daily     10.  GERD:  Continue Prilosec.     11.  BPH: Continue Flomax.     12.  Depression: Continue Cymbalta.     13.  h/o systolic CHF:   LVEF 40-45% based on echo earlier this  month.  Has diastolic dysfunction, grade 1.  - Not on diuretic at baseline.  Does not appear fluid overloaded.    - Gentle IV hydration given TIA, may discontinue now  - Monitor for signs of overload.    Diet: Moderate Consistent CHO Diet    DVT Prophylaxis: Pneumatic Compression Devices and Ambulate every shift  Luis Catheter: not present  Code Status: Full Code      Disposition Plan   Expected discharge: due to recurrent acute TIA symptoms this morning, I expect he may discharge Tomorrow if he tolerates Brilinta and does not have recurrent acute TIA symptoms, recommended to prior living arrangement once clinically improved and discharge plan formally established.  Entered: Chloe Zarate PA-C 08/25/2019, 3:05 PM       The patient's care was discussed with the Attending Physician, Dr. Cosme, Bedside Nurse, Patient, Patient's Family and Neurology Consultant.    Chloe Zarate PA-C  Hospitalist Service  [facility]    ______________________________________________________________________    Interval History   Feeling ok. Ate breakfast but not much since then. Denies hunger. Baseline dementia, unable to answer many questions.    Data reviewed today: I reviewed all medications, new labs and imaging results over the last 24 hours. I personally reviewed no images or EKG's today.    Physical Exam   Vital Signs: Temp: 98  F (36.7  C) Temp src: Oral BP: (!) 147/55 Pulse: 74 Heart Rate: 65 Resp: 18 SpO2: 94 % O2 Device: None (Room air)    Weight: 168 lbs 0 oz    General: Awake, alert, elderly man who appears stated age. Looks comfortable sitting up in bed. No acute distress.  HEENT: Normocephalic, atraumatic. Extraocular movements intact (note baseline legally blind)  Respiratory: Clear to auscultation bilaterally, no rales, wheezing, or rhonchi.  Cardiovascular: Regular rate and rhythm, +S1 and S2, no murmur auscultated. No peripheral edema.   Gastrointestinal: Soft, non-tender, non-distended.  Bowel sounds present.  Skin: Warm, dry. No obvious rashes or lesions on exposed skin. Dorsalis pedis pulses palpable bilaterally.  Musculoskeletal: No joint swelling, erythema or tenderness. Moves all extremities equally.  Neurologic: AAO x1-2. Cranial nerves 2-12 grossly intact, normal strength and sensation. Slow but comprehensible speech. Follows basic commands. FTN - touches his own nose but not my finger, he is legally blind. At times requires multiple requests before he follows commands.  Psychiatric: Appropriate mood and affect. No obvious anxiety or depression.    Data   Recent Labs   Lab 08/25/19  1144 08/25/19  0620 08/24/19  2214 08/24/19  1825   WBC  --   --  6.7 7.2   HGB  --   --  12.1* 12.5*   MCV  --   --  90 90   PLT  --   --  177 175   INR  --   --   --  0.94   NA  --  141 141 141   POTASSIUM  --  3.9 4.9 4.7   CHLORIDE  --  108 105 105   CO2  --  31 32 31   BUN  --  16 17 19   CR  --  0.73 0.74 0.86   ANIONGAP  --  2* 4 5   ALLISON  --  9.0 9.7 9.8   GLC  --  103* 112* 133*   ALBUMIN  --  3.0*  --   --    PROTTOTAL  --  6.1*  --   --    BILITOTAL  --  0.4  --   --    ALKPHOS  --  68  --   --    ALT  --  13  --   --    AST  --  15  --   --    TROPI <0.015 0.018 <0.015  --      Recent Results (from the past 24 hour(s))   CT Head w/o Contrast    Narrative    CT OF THE HEAD WITHOUT CONTRAST 8/24/2019 6:40 PM     COMPARISON: Brain MR 7/17/2019.    HISTORY: Word finding difficulties.    TECHNIQUE: 5 mm thick axial CT images of the head were acquired  without IV contrast material.    FINDINGS: There is moderate diffuse cerebral volume loss. There are  subtle patchy areas of decreased density in the cerebral white matter  bilaterally that are consistent with sequela of chronic small vessel  ischemic disease. Chronic encephalomalacia involving the anterolateral  aspect of the left frontal lobe and anterior aspect of the insula is  again noted without change.    The ventricles and basal cisterns are within  normal limits in  configuration given the degree of cerebral volume loss.  There is no  midline shift. There are no extra-axial fluid collections.    No intracranial hemorrhage, mass or recent infarct.    The visualized paranasal sinuses are well-aerated. There is no  mastoiditis. There are no fractures of the visualized bones.      Impression    IMPRESSION: Diffuse cerebral volume loss and cerebral white matter  changes consistent with chronic small vessel ischemic disease. No  evidence for acute intracranial pathology.  No change from the  comparison study.      Radiation dose for this scan was reduced using automated exposure  control, adjustment of the mA and/or kV according to patient size, or  iterative reconstruction technique    LISANDRO MAGALLON MD   CTA Head Neck with Contrast    Narrative    CT ANGIOGRAM OF THE HEAD AND NECK WITHOUT AND WITH CONTRAST  8/24/2019  6:45 PM     COMPARISON: Head and neck CT angiogram 7/16/2019.    HISTORY: Confusion. Word finding difficulties.    TECHNIQUE:  Precontrast localizing scans were followed by CT  angiography with an injection of 70 mL Isouve-370 nonionic intravenous  contrast material with scans through the head and neck.  Images were  transferred to a separate 3-D workstation where multiplanar  reformations and 3-D images were created.  Estimates of carotid  stenoses are made relative to the distal internal carotid artery  diameters except as noted.      FINDINGS:   Neck CTA: There is mild atherosclerotic narrowing at the origin of the  left common carotid artery. There is mild atherosclerotic narrowing of  the midportion of the left common carotid artery. The right common  carotid artery is patent without stenosis. Again noted is complete  occlusion of the left internal carotid artery from the origin to the  left carotid terminus. The right internal carotid artery is tortuous  but is patent without stenosis by NASCET criteria. The vertebral  arteries bilaterally are  patent without stenosis.    Head CTA: There is calcified atherosclerotic plaque of the  intracranial distal right internal carotid artery that does not result  in any vascular narrowing. The intracranial left internal carotid  artery is occluded. Again noted is irregularity in contour and mild  narrowing of the distal P2 segment of the left posterior cerebral  artery that is likely atherosclerotic in nature. The left posterior  cerebral artery and its branches are otherwise patent and  unremarkable. The basilar, bilateral anterior cerebral, bilateral  middle cerebral and right posterior cerebral arteries are patent and  unremarkable. The anterior communicating and bilateral posterior  communicating arteries are patent.      Impression    IMPRESSION:  1. Complete occlusion of the left internal carotid artery from the  origin to the left carotid terminus again noted.  2. Presumed mild atherosclerotic narrowing of the distal P2 segment of  the left posterior cerebral artery again noted.  3. Plaque without stenosis of the intracranial distal right internal  carotid artery again noted.  4. Otherwise, normal neck and head CTA.      Radiation dose for this scan was reduced using automated exposure  control, adjustment of the mA and/or kV according to patient size, or  iterative reconstruction technique    LISANDRO MAGALLON MD   CT Head w Contrast    Narrative    CT BRAIN PERFUSION 8/24/2019 6:55 PM    COMPARISON: None.    HISTORY: Word finding difficulties. Confusion.    TECHNIQUE: Time sequential axial CT images of the head were acquired  during the administration of intravenous contrast (50 mL Isovue-370).  CTA images of the [place] as well as color perfusion maps of  the brain were created from this time sequential axial source data.      Impression    IMPRESSION: There is decreased cerebral blood flow and decreased  cerebral blood volume within and at the margins of the chronic  ischemic infarct at the  anterolateral aspect of the left frontal lobe.  No other perfusion defects. No evidence for acute ischemia.    IMPRESSION: Normal CT perfusion of the brain.      Radiation dose for this scan was reduced using automated exposure  control, adjustment of the mA and/or kV according to patient size, or  iterative reconstruction technique    LISANDRO MAGALLON MD     Medications     - MEDICATION INSTRUCTIONS -         aspirin  81 mg Oral Daily     atorvastatin  40 mg Oral Daily     DULoxetine  30 mg Oral Daily     insulin aspart  1-7 Units Subcutaneous TID AC     insulin aspart  1-5 Units Subcutaneous At Bedtime     insulin glargine  18 Units Subcutaneous At Bedtime     levETIRAcetam  750 mg Oral BID     magnesium oxide  400 mg Oral BID     omeprazole  20 mg Oral QAM     tamsulosin  0.4 mg Oral At Bedtime     [START ON 8/26/2019] ticagrelor  90 mg Oral BID

## 2019-08-25 NOTE — ED NOTES
Report received from VAN Mckeon - plans for admission discussed with pt and wife at this time. Will continue to monitor.

## 2019-08-25 NOTE — PROGRESS NOTES
"   08/25/19 1500   Quick Adds   Type of Visit Initial PT Evaluation   Living Environment   Lives With spouse   Living Arrangements house   Home Accessibility stairs within home   Number of Stairs, Within Home, Primary 5   Stair Railings, Within Home, Primary railing on right side (ascending)   Transportation Anticipated family or friend will provide   Living Environment Comment Main floor living once upstairs   Self-Care   Usual Activity Tolerance moderate   Current Activity Tolerance moderate   Regular Exercise Yes   Activity/Exercise Type walking   Exercise Amount/Frequency daily   Activity/Exercise/Self-Care Comment Pt and his son report that he walks outside daily with the SEC for exercise. He does use a FWW when he anticipates being in a crowd   Functional Level Prior   Ambulation 1-->assistive equipment  (SEC in the house, FWW in the community)   Transferring 1-->assistive equipment   Toileting 0-->independent   Bathing 0-->independent   Communication 0-->understands/communicates without difficulty   Swallowing 0-->swallows foods/liquids without difficulty   Fall history within last six months no   Prior Functional Level Comment Pt is alone for periods of time during the day when his wife is at work but there are people who come and check on him   General Information   Onset of Illness/Injury or Date of Surgery - Date 08/24/19   Referring Physician Gagan Jerome MD   Patient/Family Goals Statement \"Go home\"   Pertinent History of Current Problem (include personal factors and/or comorbidities that impact the POC) 89 YOM presented with slurred speech. Admitted under OBS status with possible TIA. PMH significant for TIA and CVAs, seizure disorder, L ICA occlusion, s/p CEA for R carotid, HTN, IDDM, MODESTA x 2   Precautions/Limitations fall precautions   Weight-Bearing Status - LUE full weight-bearing   Weight-Bearing Status - RUE full weight-bearing   Weight-Bearing Status - LLE full weight-bearing "   Weight-Bearing Status - RLE full weight-bearing   General Observations Pleasant and cooperative, [place]   General Info Comments Activity: ambulate with assist   Cognitive Status Examination   Orientation orientation to person, place and time   Level of Consciousness alert   Follows Commands and Answers Questions 75% of the time;able to follow single-step instructions   Pain Assessment   Patient Currently in Pain Yes, see Vital Sign flowsheet  (3/10 headache)   Posture    Posture Forward head position;Protracted shoulders;Kyphosis   Range of Motion (ROM)   ROM Comment BLEs WFL   Strength   Strength Comments BLEs grossly 4+/5 throughout   Bed Mobility   Bed Mobility Comments Supine>sit with CGA   Transfer Skills   Transfer Comments Sit>stand from EOB with pt's own SEC and shoes donned with CGA   Gait   Gait Comments Gait with shoes donned and pt's own SEC x 5' with Oni for balance   Balance   Balance Comments Good sitting, requires UE support for standing   Coordination   Coordination no deficits were identified   General Therapy Interventions   Planned Therapy Interventions balance training;bed mobility training;gait training;transfer training;neuromuscular re-education   Clinical Impression   Criteria for Skilled Therapeutic Intervention yes, treatment indicated   PT Diagnosis Impaired gait   Influenced by the following impairments weakness, fatigue, impaired balance   Functional limitations due to impairments bed mobility, tranfers, gait   Clinical Presentation Stable/Uncomplicated   Clinical Presentation Rationale see above   Clinical Decision Making (Complexity) Low complexity   Therapy Frequency 3x/week   Predicted Duration of Therapy Intervention (days/wks) 4 days   Anticipated Discharge Disposition Home with Assist;Home with Outpatient Therapy   Risk & Benefits of therapy have been explained Yes   Patient, Family & other staff in agreement with plan of care Yes   Clinical Impression Comments Pt's wife prefers  "OPPT to home PT. Pt will benfit from one more PT session tomorrow to determine if his mobility has improved enough to return home   [facility] AM-PAC TM \"6 Clicks\"   2016, Trustees of [facility], under license to [employer].  All rights reserved.   6 Clicks Short Forms Basic Mobility Inpatient Short Form   [facility] AM-PAC  \"6 Clicks\" V.2 Basic Mobility Inpatient Short Form   1. Turning from your back to your side while in a flat bed without using bedrails? 4 - None   2. Moving from lying on your back to sitting on the side of a flat bed without using bedrails? 3 - A Little   3. Moving to and from a bed to a chair (including a wheelchair)? 3 - A Little   4. Standing up from a chair using your arms (e.g., wheelchair, or bedside chair)? 3 - A Little   5. To walk in hospital room? 3 - A Little   6. Climbing 3-5 steps with a railing? 3 - A Little   Basic Mobility Raw Score (Score out of 24.Lower scores equate to lower levels of function) 19   Total Evaluation Time   Total Evaluation Time (Minutes) 15     "

## 2019-08-26 NOTE — PROGRESS NOTES
EEG CLINICAL NEUROPHYSIOLOGY PRELIMINARY REPORT    First two hours of video-EEG reviewed. Six to seven Hz posterior dominant rhythm. Occasional runs of frontal intermittent rhythmic delta. No persistent focal features. No epileptiform discharges or seizures.    Background activity consistent with moderate diffuse encephalopathy. Thus far no evidence of seizures or seizure tendency. Will continue video-EEG monitoring. Full report to follow.    Fer Blanco MD  Pager [phone]

## 2019-08-26 NOTE — PLAN OF CARE
Discharge Planner OT   Patient plan for discharge: Wife reports TCU  Current status: OT eval completed and treatment initiated. OK to see per RN; pt has had some neuro changes this am and team aware. Pt was mostly able to follow siomple, single step directions. His speech varied: at times perseverated on one word and answered every question the same, other times had word-salad, other times answered appropriately. Alert and oriented to self, otherwise confused. Losing balance backwards while seated EOB, requiring at least CGA for seated ADL such as sock-donning. Used urinal in standing with Min A for transfer and Max A to hold urinal in place. Was too unsteady to ambulate to bathroom. Felt nauseous at end of session so laid back down. Pt with low vision and [place], but hearing aids in now. Wife present and supportive.  Barriers to return to prior living situation: Impaired ADL and mobility, level of assist with ADL, falls risk, impaired speech and cognition, wife works full-time and pt would be home alone if he went home.  Recommendations for discharge: TCU  Rationale for recommendations: Pt not safe for discharge home currently, will be receiving further medical work-up to determine cause of his deficits, as his abilities have waxed and waned. Pt may be appropriate for ARU if he continues to have functional deficits with ADL, cognition, speech, and mobility.       Entered by: Rosa Nesbitt 08/26/2019 9:42 AM

## 2019-08-26 NOTE — PLAN OF CARE
A&Ox2-3, disoriented to time mostly. VSS on RA. Tele: NS with occasional PVC's and PAC's with BBB. Up with SBA and cane and GB. Mod CHO diet, . Denies pain. Neuros and CMS intact, macular degeneration. Denies pain. Scoring green on aggression assessment tool. Continue to monitor.     RRT called around 0645. Pt having word finding difficulty, not following commands and possibly some sensation not intact but unable to fully assess as pt wasn't following commands. Certain words pronounced were clear however seemed frustrated when having word finding difficulty. Going to check UA and labs. Wife Halina came to floor around the time RRT was called. Stated 3 occurrence and seems to be progressively worsening.

## 2019-08-26 NOTE — CODE/RAPID RESPONSE
[facility]    RRT Note  8/26/2019   Time Called: 0642    RRT called for: Word finding difficulties    Assessment & Plan   IMPRESSION & PLAN:    89-year-old man with PMH of DM 2, hypertension, hyperlipidemia, anxiety, depression, TIA/seizures with recent admission and extensive work-up, HFrEF, CAD with drug-eluting stent, left chronic carotid occlusion status post right carotid endarterectomy who was initially registered to observationfor evaluation of slurred speech.  Code stroke was called on 8/25/2019 for left-sided facial paresthesias garbled speech delayed response to following commands.  Code stroke was de-escalated because patient recently completed TIA evaluation in the preceding 2 months with extensive imaging and he was already felt medically optimized.  Since then his Plavix has been changed to Brilinta.     Staff noted patient to be confused to have fluent speech at 630 this morning when he was being assisted to the bathroom.  Shortly after he began speaking gibberish and was unable to follow commands.  RRT was called at that time.  Patient's wife arrived a short while later who noted increasing in frequency of episodes.  On my arrival patient is lying in bed without acute distress is very hard of hearing.  Blood pressure is 160/81, MEP 88, SPO2 97% on room air.  Heart rate and respiratory reviewed as per EMR.    Differential diagnosis: Stroke, seizure (no abnl mvmts noted, no loss of bowel or bladder control), metabolic encephalopathy (no noted fevers), electrolyte abnormality including glucose/ sodium.  MAR reviewed head no offending sedatives, opioids, CNS depressants recently administered.  Delirium may also be contributing from a new environment.  Patient spouse reported he had a BM yesterday, 8/25/2019.    INTERVENTIONS:  [employer] Stroke Scale  Exam Interval: Baseline   Score    Level of consciousness: (0)   Alert, keenly responsive    LOC questions: (2)    Answers neither question correctly    LOC commands: (2)   Performs neither task correctly    Best gaze: (1)   Partial gaze palsy    Visual: JAIME    Facial palsy: (1)   Minor paralysis (flat nasolabial fold, smile asymmetry)    Motor arm (left): (0)   No drift    Motor arm (right): (0)   No drift    Motor leg (left): (0)   No drift    Motor leg (right): (0)   No drift    Limb ataxia: JAIME    Sensory: JAIME (not able to answer questions to confirm or deny    Best language: (2)   Severe aphasia    Dysarthria: (1)   Mild to moderate dysarthria    Extinction and inattention: (1)   Visual, tacile, auditory, spatial, person inattention        Total Score:  10     -gluc 146  -metabolic w/u w/ BMP, UA.   No hypoxia or increased work of breathing to obtain chest x-ray.  -Because patient underwent the 3 CT neuro imaging series on 8/25/2019 I did not call code stroke  -I did call and spoke with neuro stroke fellow, will defer to that team, regarding possibly obtaining MRI and/or loop recorder.  Patient has already had extensive imaging and it is not clear that further neuroimaging will change his medical management.  also in the absence of a clear reason to anticoagulate risks outweigh benefits at this time because of frequent falls. Neurology will see pt again today  -Continue dual antiplatelet therapy and antiepileptic drugs  -disposition: pt can remain on same unit    At the end of the RRT labs are about to be drawn.       Discussed with and defer further cares to rounding hospitalist physician Dr. Rose    Interval History     Dustin Pearce is a 89 year old male who was admitted on 8/24/2019 for slurred speech.    Medical history significant for:   Past Medical History:   Diagnosis Date     Abdominal aortic aneurysm (H) 12/25/2016     Acute on chronic systolic congestive heart failure (H) 6/7/2018     Anemia 6/7/2018     Anemia due to blood loss, acute 6/13/2017     Aortic stenosis     mild     Benign essential hypertension  1/6/2017     Benign non-nodular prostatic hyperplasia with lower urinary tract symptoms 10/20/2016     CAD (coronary artery disease)     MI 1970     Carotid artery stenosis 10/20/2016    chronically occluded left internal carotid artery     Cerebrovascular accident (H) 10/20/2016     Cognitive impairment 6/7/2018     Coronary artery disease of native artery of native heart with stable angina pectoris (H) 10/20/2016    MI 1970     Depression, unspecified depression type 2/22/2018     Diabetes mellitus (H)      Diabetic ulcer of left foot associated with diabetes mellitus due to underlying condition (H) 6/12/2017     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     Gastro-oesophageal reflux disease      Gastroesophageal reflux disease without esophagitis 10/20/2016     Heart attack (H)      Hyperlipidemia      Hyperlipidemia, unspecified hyperlipidemia type 10/20/2016     Ischemic cardiomyopathy      Lumbar back pain 12/30/2016     Mild cognitive impairment 10/20/2016     Nephrolithiasis     RECURRENT     NSTEMI (non-ST elevated myocardial infarction) (H) 6/7/2018     Osteopenia      PAD (peripheral artery disease) (H)     peripheral angiogram 5/2017: The common iliac artery stenoses were stented and the superficial femoral artery stenoses were angioplastied     Paroxysmal atrial fibrillation (H)      Peripheral artery disease (H)     peripheral angiogram 5/2017: The common iliac artery stenoses were stented and the superficial femoral artery stenoses were angioplastied     RBBB with left anterior fascicular block      Slow transit constipation 2/22/2018     Stroke of unknown etiology (H) 12/31/2017     Syncope      TIA (transient ischemic attack) 1/20/2017     Unspecified cerebral artery occlusion with cerebral infarction      UTI (urinary tract infection) due to Enterococcus 2/22/2018     Ventricular tachycardia (H)     nonsustained       Code Status: Full Code    Allergies   Allergies   Allergen  "Reactions     Oxycodone Other (See Comments)     \"TERRIBLE SWEATING\"     Sulfa Drugs      Tetracycline        Physical Exam   Vital Signs with Ranges:  Temp:  [96.2  F (35.7  C)-98.3  F (36.8  C)] 98.3  F (36.8  C)  Heart Rate:  [54-85] 54  Resp:  [16-20] 18  BP: (117-160)/(36-77) 144/77  SpO2:  [93 %-99 %] 93 %  I/O last 3 completed shifts:  In: 840 [P.O.:240; I.V.:600]  Out: 125 [Urine:125]    Constitutional:no acute distress  ENT: defer, [place]   Neck: supple  Pulmonary: defer  Cardiovascular: NSR on telemetry monitoring, BP is hypertensive  GI: defer  Skin/Integumen: no mottling of LE  Neuro: see NIHSS above  Psych:  defer  Extremities: defer    Data   CBC with Diff:  Recent Labs   Lab Test 08/24/19  2214 08/24/19  1825   WBC 6.7 7.2   HGB 12.1* 12.5*   MCV 90 90    175   INR  --  0.94        Lactic Acid:    Lab Results   Component Value Date    LACT 1.3 05/28/2018           Comprehensive Metabolic Panel:  Recent Labs   Lab 08/25/19  0620      POTASSIUM 3.9   CHLORIDE 108   CO2 31   ANIONGAP 2*   *   BUN 16   CR 0.73   GFRESTIMATED 81   GFRESTBLACK >90   ALLISON 9.0   PROTTOTAL 6.1*   ALBUMIN 3.0*   BILITOTAL 0.4   ALKPHOS 68   AST 15   ALT 13       INR:    Recent Labs   Lab Test 08/24/19  1825   INR 0.94       Time Spent on this Encounter   I spent 32 minutes (2307-6103) on the unit/floor managing the care of Dustin Pearce. Over 50% of my time was spent counseling the patient and/or coordinating care regarding services listed in this note.    Kendra Tpaia, [facility]  Hospitalist House [facility]  [phone]          "

## 2019-08-26 NOTE — PLAN OF CARE
PT: Pt continuing to have ongoing neuro changes, now IP. In process of transferring rooms and setting up EEG at time of tx.

## 2019-08-26 NOTE — PROGRESS NOTES
"[facility]    Vascular Neurology Progress Note      Interval History   Patient had another event this morning with word finding difficulty and not following commands  Assessment & Plan     Impression:  Dustin Pearce is a 89 year old male PMHx of DM, HTN, HL, anxiety and depression, prior TIA, and other recent admissions X 2 for probable TIA vs seizure, CHF, severe 3V CAD, chronic L carotid artery occlusion, and h/o of R CEA, brought to ED for repeated 30 min -1 hour episodes of garbled speech.  Patient has had a thorough workup multiple times in past few months including MRI,EEG and vessel imaging, ziopatch for cardiac monitoring which has yet to be read . Medically maximized on on ASA 81, brilinta and also lipitor 40. As for as chart review shows, no episode of afib/flutter have been noted during his previous admissions.     Recommendations:  - Start continuous video EEG, preliminarily for 24-48 hours in an attempt to capture spells on video EEG which thus far has not yet happened  - Continue aspirin, brilinta, lipitor for stroke prevention  - Continue Keppra 750mg q12 hours  - Continue q4 hour neuro checks  - No need for repeat neuroimaging now    We will continue to follow.     Patient follow up:  - final recommendation pending work-up    Diane Miranda PA-C  Neurology  08/26/2019 3:56 PM  Text Page (8am-5pm)  To page stroke neurology after hours or on a subsequent day, click here: [facility]COM  Choose \"On Call\" tab at top, then search dropdown box for \"Neurology Adult\" & press Enter, look for Neuro ICU/Stroke      ____________________________________________________________________      Medications     Scheduled medications    aspirin  81 mg Oral Daily     atorvastatin  40 mg Oral Daily     DULoxetine  30 mg Oral Daily     insulin aspart  1-7 Units Subcutaneous TID AC     insulin aspart  1-5 Units Subcutaneous At Bedtime     insulin glargine  18 Units Subcutaneous At Bedtime     levETIRAcetam  750 " "mg Oral BID     magnesium oxide  400 mg Oral BID     omeprazole  20 mg Oral QAM     tamsulosin  0.4 mg Oral At Bedtime     ticagrelor  90 mg Oral BID       Infusion medications    - MEDICATION INSTRUCTIONS -         PRN medications  Current Facility-Administered Medications   Medication Dose Route Frequency     acetaminophen  650 mg Rectal Q4H PRN     acetaminophen  500-1,000 mg Oral Q6H PRN     amitriptyline  25 mg Oral At Bedtime PRN     bisacodyl  10 mg Rectal Daily PRN     glucose  15-30 g Oral Q15 Min PRN    Or     dextrose  25-50 mL Intravenous Q15 Min PRN    Or     glucagon  1 mg Subcutaneous Q15 Min PRN     hydrALAZINE  10-20 mg Intravenous Q1H PRN     labetalol  10-40 mg Intravenous Q10 Min PRN     magnesium sulfate  2 g Intravenous Daily PRN     magnesium sulfate  4 g Intravenous Q4H PRN     - MEDICATION INSTRUCTIONS -   Does not apply Continuous PRN     melatonin  1 mg Oral At Bedtime PRN     naloxone  0.1-0.4 mg Intravenous Q2 Min PRN     nitroGLYcerin  0.4 mg Sublingual Q5 Min PRN     ondansetron  4 mg Oral Q6H PRN    Or     ondansetron  4 mg Intravenous Q6H PRN     polyethylene glycol  17 g Oral Daily PRN     polyethylene glycol-propylene glycol  2 drop Both Eyes Daily PRN     potassium chloride  20-40 mEq Oral or Feeding Tube Q2H PRN     potassium chloride with lidocaine  10 mEq Intravenous Q1H PRN     potassium chloride  10 mEq Intravenous Q1H PRN     potassium chloride  20 mEq Intravenous Q1H PRN     potassium chloride  20-40 mEq Oral Q2H PRN       Allergies   Allergies   Allergen Reactions     Oxycodone Other (See Comments)     \"TERRIBLE SWEATING\"     Sulfa Drugs      Tetracycline        Physical Exam     Temp:  [98  F (36.7  C)-98.7  F (37.1  C)] 98.7  F (37.1  C)  Heart Rate:  [] 94  Resp:  [15-18] 16  BP: (119-160)/() 121/56  SpO2:  [93 %-99 %] 93 %    Neurologic  Mental Status:  Alert, oriented to hospital, said month was January and season was fall. He followed a 1step but not 2 " step command  Cranial Nerves:  visual fields intact, PERRL, EOMI with normal smooth pursuit, facial sensation intact and symmetric,mild left nasolabial fold flattening, hearing not formally tested but intact to conversation, palate elevation symmetric and uvula midline, no dysarthria, shoulder shrug strong bilaterally, tongue protrusion midline  Motor:   5/5 in all 4 ext  Sensory:  light touch sensation intact and symmetric throughout upper and lower extremities,does not participate in double simultaneous  Coordination:  does not participate  Station/Gait:  deferred      Data     Imaging  No new 8/26    Labs:  CBC:  Recent Labs   Lab 08/24/19  2214 08/24/19  1825   WBC 6.7 7.2   RBC 4.21* 4.45   HGB 12.1* 12.5*   HCT 37.8* 40.0    175       Basic Metabolic Panel:   Recent Labs   Lab Test 08/26/19  0722 08/25/19  0620    141   POTASSIUM 3.9 3.9   CHLORIDE 107 108   CO2 27 31   BUN 13 16   CR 0.65* 0.73   * 103*   ALLISON 9.4 9.0       Liver panel:  Recent Labs   Lab Test 08/25/19  0620 07/17/19  1548   PROTTOTAL 6.1* 6.5*   ALBUMIN 3.0* 3.2*   BILITOTAL 0.4 0.5   ALKPHOS 68 83   AST 15 17   ALT 13 18       INR:  Recent Labs   Lab Test 08/24/19  1825 07/16/19  1739 06/30/19  2345   INR 0.94 0.98 1.01        Lipid Profile:  Recent Labs   Lab Test 08/25/19  0620 04/09/19  0626   CHOL 118 134   HDL 43 47   LDL 49 64   TRIG 130 113       A1C:   Recent Labs   Lab Test 07/01/19  0607 05/30/19  1854 02/28/19  1913   A1C 8.3* 8.8* 8.7*       Troponin I:   Recent Labs   Lab Test 08/25/19  1144 08/25/19  0620 08/24/19  2214   TROPI <0.015 0.018 <0.015

## 2019-08-26 NOTE — PROGRESS NOTES
[facility]    Medicine Progress Note - Hospitalist Service       Date of Admission:  8/24/2019  Assessment & Plan   Dustin Pearce is a 89 year old male admitted on 8/24/2019.  PMH significant for IDDM, HTN, HL, anxiety and depression, prior TIA, recent admissions x2 for probable TIA vs seizure, chronic systolic CHF, CAD s/p PCI, chronic left carotid artery occlusion, h/o of right CEA.  He was brought to the ER by his wife for evaluation of slurred speech.     Slurred speech  Two recent presentations for expressive aphasia and dysarthria 7/1/19 and 7/16/19 with extensive work-up including head CT, MRI, EEG, negative TTE with bubble.  Keppra was increased, though no seizure activity witnessed or seen on EEG.  Continued on Plavix.  Presenting again with similar symptoms with CTA unchanged from prior, CT/CTP without acute pathology.  P2Y12 level suggests inadequate response to Plavix - switched to Brilinta 8/25 Has experienced recurrent symptoms following arrival with RRT's called 8/25 and 8/26.  - Continue PTA aspirin, statin, Brilinta  - Continue PTA Keppra; start continuous EEG video monitoring  - no arrhythmias on tele, family considering implantable loop recorder (though questionable candidate for anticoagulation)  - neuro recs appreciated    Chronic systolic CHF  Severe 3-vessel CAD s/p PCI to proximal LAD 5/2018  PAD s/p right CEA and chronic left ICA occlusion  Hx TIA/CVA  LVEF 40-45%, grade 1 diastolic dysfunction on TTE 8/5/19.    - continue PTA aspirin and atorvastatin  - Not on diuretic at baseline     IDDM  HbA1c 8.3% 7/1/19.  - Lantus 18 units nightly with medium ssi     Hypertension.    - Hold PTA lisinopril and Toprol to allow some permissive hypertension.   - Labetalol and hydralazine p.r.n. available     Seizure disorder  Continue PTA Keppra 750 mg p.o. twice daily     GERD  Continue Prilosec.      BPH  Continue Flomax.      Depression  Continue Cymbalta.              Diet: Moderate  Consistent CHO Diet    DVT Prophylaxis: Pneumatic Compression Devices  Luis Catheter: not present  Code Status: Full Code      Disposition Plan   Expected discharge: 2 - 3 days, recommended to transitional care unit once work-up complete, cleared by Neurology.  Entered: Blaze Rose MD 08/26/2019, 2:38 PM       The patient's care was discussed with the Bedside Nurse, Patient and Patient's Family.    Blaze Rose MD  Hospitalist Service  [facility]    ______________________________________________________________________    Interval History   He denies any headache, dysarthria, focal weakness or paresthesias, confusion.  Family and significant other report he remains significantly below baseline, though speech and ability to follow commands has improved since this morning.  Significant other reports episode this morning was identical to prior two.  Is concerned that his symptoms have never occurred this close together or had this lasting effect.    Data reviewed today: I reviewed all medications, new labs and imaging results over the last 24 hours. I personally reviewed no images or EKG's today.    Physical Exam   Vital Signs: Temp: 98.7  F (37.1  C) Temp src: Oral BP: 121/56   Heart Rate: 94 Resp: 16 SpO2: 93 % O2 Device: None (Room air)    Weight: 168 lbs 0 oz  General Appearance: Well developed, well nourished male in NAD  Respiratory: lungs CTAB, no wheezes or crackles, no tachypnea  Cardiovascular: RRR, normal s1/s2 without murmur  GI: abdomen soft, normal bowel sounds  Other: Alert, oriented to person and knows he is in a hospital, cranial nerves grossly intact, follows commands     Data   Recent Labs   Lab 08/26/19  0722 08/25/19  1144 08/25/19  0620 08/24/19  2214 08/24/19  1825   WBC  --   --   --  6.7 7.2   HGB  --   --   --  12.1* 12.5*   MCV  --   --   --  90 90   PLT  --   --   --  177 175   INR  --   --   --   --  0.94     --  141 141 141   POTASSIUM 3.9  --  3.9 4.9 4.7    CHLORIDE 107  --  108 105 105   CO2 27  --  31 32 31   BUN 13  --  16 17 19   CR 0.65*  --  0.73 0.74 0.86   ANIONGAP 7  --  2* 4 5   ALLISON 9.4  --  9.0 9.7 9.8   *  --  103* 112* 133*   ALBUMIN  --   --  3.0*  --   --    PROTTOTAL  --   --  6.1*  --   --    BILITOTAL  --   --  0.4  --   --    ALKPHOS  --   --  68  --   --    ALT  --   --  13  --   --    AST  --   --  15  --   --    TROPI  --  <0.015 0.018 <0.015  --

## 2019-08-26 NOTE — PROGRESS NOTES
08/26/19 0838   Quick Adds   Type of Visit Initial Occupational Therapy Evaluation   Living Environment   Lives With spouse   Living Arrangements house   Living Environment Comment 5 steps to enter then can stay on the main level.    Self-Care   Usual Activity Tolerance moderate   Current Activity Tolerance poor   Regular Exercise Yes   Activity/Exercise Type walking   Exercise Amount/Frequency daily   Activity/Exercise/Self-Care Comment Hasn't driven in years due to low vision. Uses a cane mostly but a WW on longer outings   Functional Level   Ambulation 1-->assistive equipment   Transferring 1-->assistive equipment   Toileting 0-->independent   Bathing 3-->assistive equipment and person  (wife assists)   Dressing 0-->independent   Eating 0-->independent   Communication 0-->understands/communicates without difficulty   Swallowing 0-->swallows foods/liquids without difficulty   Cognition 1 - attention or memory deficits   Fall history within last six months no   Prior Functional Level Comment Mild memory issues per wife. Neighbors check on pt while wife is at work. Wife does med and money management   General Information   Onset of Illness/Injury or Date of Surgery - Date 08/24/19   Referring Physician Chloe Zarate PA-C   Patient/Family Goals Statement Wife would like pt to go to rehab, pt did not state   Additional Occupational Profile Info/Pertinent History of Current Problem 89 YOM presented with slurred speech. Admitted under OBS status with possible TIA. PMH significant for TIA and CVAs, seizure disorder, L ICA occlusion, s/p CEA for R carotid, HTN, IDDM, MO[place] x 2. Pt is now inpatient status with continued episodes of slurred speech, incoordination and difficult mobility.   Precautions/Limitations fall precautions   Cognitive Status Examination   Orientation person   Level of Consciousness alert   Follows Commands (Cognition) follows one step commands;75-90% accuracy   Memory impaired   Attention  "Distractible during evaluation   Organization/Problem Solving Sequencing impaired;Problem solving impaired   Cognitive Comment Requires further assessment   Visual Perception   Visual Perception Comments legally blind, L>R due to macular degeneration. Unable to follow directions to track objects   Sensory Examination   Sensory Comments Unable to accurately report for testing, as he was perseverating on the word \"No\" at the time   Pain Assessment   Patient Currently in Pain No   Range of Motion (ROM)   ROM Comment BUE AROM intact   Strength   Strength Comments BUE grossly 4/5 at shoulders and elbows, equal   Hand Strength   Hand Strength Comments fair, equal   Muscle Tone Assessment   Muscle Tone Quick Adds No deficits were identified   Coordination   Coordination Comments Unable to follow directions for finger to nose testing but did somewhat attempt   Mobility   Bed Mobility Comments Min A supine to EOB   Transfer Skill: Sit to Stand   Level of Fred[place]: Sit/Stand minimum assist (75% patients effort)   Physical Assist/Nonphysical Assist: Sit/Stand verbal cues   Toilet Transfer   Toilet Transfer Comments Too unsteady on feet to ambulate to toilet this am   Balance   Balance Comments Losing balance backwards at EOB, unsteady in standing, requiring at least Min A   Upper Body Dressing   Level of Fred[place]: Dress Upper Body moderate assist (50% patients effort)   Physical Assist/Nonphysical Assist: Dress Upper Body verbal cues   Lower Body Dressing   Level of Frederic: Dress Lower Body moderate assist (50% patients effort)   Physical Assist/Nonphysical Assist: Dress Lower Body verbal cues   Toileting   Level of Frederic: Toilet maximum assist (25% patients effort)   Grooming   Level of Frederic: Grooming minimum assist (75% patients effort)   Eating/Self Feeding   Level of Frederic: Eating stand-by assist   Physical Assist/Nonphysical Assist: Eating set-up required   Instrumental Activities of " Daily Living (IADL)   IADL Comments Wife does meds, finances, does not drive.   Activities of Daily Living Analysis   Impairments Contributing to Impaired Activities of Daily Living balance impaired;cognition impaired;coordination impaired;postural control impaired;strength decreased;sensory feedback impaired   General Therapy Interventions   Planned Therapy Interventions ADL retraining;IADL retraining;balance training;bed mobility training;cognition;fine motor coordination training;neuromuscular re-education;motor coordination training;strengthening;progressive activity/exercise;risk factor education;visual perception;transfer training   Clinical Impression   Criteria for Skilled Therapeutic Interventions Met yes, treatment indicated   OT Diagnosis impaired ADL and mobility   Influenced by the following impairments possible TIAs, [place], low vision   Assessment of Occupational Performance 3-5 Performance Deficits   Identified Performance Deficits ADL, IADL, mobility   Clinical Decision Making (Complexity) Moderate complexity   Therapy Frequency Daily   Predicted Duration of Therapy Intervention (days/wks) 1 week   Anticipated Equipment Needs at Discharge shower chair;raised toilet seat   Anticipated Discharge Disposition Transitional Care Facility;Acute Rehabilitation Facility   Risks and Benefits of Treatment have been explained. Yes   Patient, Family & other staff in agreement with plan of care Yes   Clinical Impression Comments Will continue to assess TCU vs ARU, may be able to tolerate incraesed frequency. MDs still doing medical work up to determine cause of deficits and course of treatment.   Total Evaluation Time   Total Evaluation Time (Minutes) 10

## 2019-08-26 NOTE — PLAN OF CARE
Arrived from Obs unit 1900. Pt here with suspected TIA.  A&Ox3, off on date. Neuros intact except slight slurred, but pretty near normal per family. [place]. Blind left eye, macular degeneration. VSS. Tele SR with BBB and PVCs. Mod cho diet, thin liquids. Takes pills whole. Up with SBA. Denies pain. Pt scoring green on the Aggression Stop Light Tool. Possible discharge tomorrow.

## 2019-08-26 NOTE — PLAN OF CARE
Disoriented to time/situation, neuro checks improving throughout the day. Word finding difficulty improving along with 4-5/5 strength on extremities. [place] and legally blind. Denies numbness/tingling or pain. VSS on RA. UA sent today, negative for UTI. 48hr EEG started at 1445. BGM, sliding scale insulin given. Voiding in urinal. Tolerating regular diet. Family at bedside, discharge date pending likely to TCU.

## 2019-08-26 NOTE — PROGRESS NOTES
LTV - Day 1 started @ 2360. Ordered by Dr. Ventura/Milli. Explained procedure to patient and family prior to hook up. Video Observation initiated, patient informed. Pt consented    Felciita Ahuja

## 2019-08-27 NOTE — PLAN OF CARE
Discharge Planner PT   Patient plan for discharge: rehab  Current status: PT: Pt in bed upon PT arrival, wife present, contiuous EEG running, RN approved session. Supine>sit with HOB elevated 30 degrees and Oni to elevate his trunk, pt able to move LEs to EOB independently. Able to sit at EOB x 6 mins with SBA. Sit<>stand from EOB to FWW x 4 trials with modA for balance and correcting R lateral lean. Pt reports that he can feel himself leaning butis unable to correct without assist. Able to stand up to 2 mins each trial. Gait with a FWW 4 steps forward/backward x 2 trials with modA for balance, correcting R lateral lean and walker management. Sit>supine with Oni for LEs and repositioning his trunk.  Barriers to return to prior living situation: level of assist required, fall risk, weakness, medical status, stairs, alone for periods of time  Recommendations for discharge: TCU  Rationale for recommendations: Pt would benefit from PT at TCU to progress strength, balance and mobility so pt can return home safely. Pt has had a decline in mobility since his eval on 8/26/19.        Entered by: Zohreh Anderson 08/27/2019 3:03 PM

## 2019-08-27 NOTE — PROGRESS NOTES
[facility]    Medicine Progress Note - Hospitalist Service       Date of Admission:  8/24/2019  Assessment & Plan   Dustin Pearce is a 89 year old male admitted on 8/24/2019.  PMH significant for IDDM, HTN, HL, anxiety and depression, prior TIA, recent admissions x2 for probable TIA vs seizure, chronic systolic CHF, CAD s/p PCI, chronic left carotid artery occlusion, h/o of right CEA.  He was brought to the ER by his wife for evaluation of slurred speech.     Slurred speech  Two recent presentations for expressive aphasia and dysarthria 7/1/19 and 7/16/19 with extensive work-up including head CT, MRI, EEG, negative TTE with bubble.  Keppra was increased, though no seizure activity witnessed or seen on EEG.  Continued on Plavix.  Presenting again with similar symptoms with CTA unchanged from prior, CT/CTP without acute pathology.  P2Y12 level suggests inadequate response to Plavix - switched to Brilinta 8/25 Has experienced recurrent symptoms following arrival with RRT's called 8/25 and 8/26.  - Continue PTA aspirin, statin, Brilinta  - Continue PTA Keppra  - continuous EEG monitoring (initiated 8/26) without seizure  - no arrhythmias on tele, family considering implantable loop recorder (though questionable candidate for anticoagulation)  - neuro recs appreciated    Chronic systolic CHF  Severe 3-vessel CAD s/p PCI to proximal LAD 5/2018  PAD s/p right CEA and chronic left ICA occlusion  Hx TIA/CVA  LVEF 40-45%, grade 1 diastolic dysfunction on TTE 8/5/19.    - continue PTA aspirin and atorvastatin  - Not on diuretic at baseline     IDDM  HbA1c 8.3% 7/1/19.  - Lantus 18 units nightly with medium ssi  - add prandial insulin 1 unit/carb unit tid 8/27     Hypertension.    - Hold PTA lisinopril and Toprol as currently normotensive  - Labetalol and hydralazine p.r.n. available     Seizure disorder  Continue PTA Keppra 750 mg p.o. twice daily     GERD  Continue Prilosec.      BPH  Continue Flomax.       Depression  Continue Cymbalta.              Diet: Moderate Consistent CHO Diet    DVT Prophylaxis: Pneumatic Compression Devices  Luis Catheter: not present  Code Status: Full Code      Disposition Plan   Expected discharge: 2 - 3 days, recommended to transitional care unit once work-up complete, cleared by Neurology.  Entered: Blaze Rose MD 08/27/2019, 1:38 PM       The patient's care was discussed with the Bedside Nurse, Patient and Patient's Family.    Blaze Rose MD  Hospitalist Service  [facility]    ______________________________________________________________________    Interval History   He again denies any focal neurologic symptoms.  No fever/chills, dyspnea, appetite is good.  Speech has improved today.    Data reviewed today: I reviewed all medications, new labs and imaging results over the last 24 hours. I personally reviewed no images or EKG's today.    Physical Exam   Vital Signs: Temp: 97.3  F (36.3  C) Temp src: Oral BP: 96/49 Pulse: 81 Heart Rate: 81 Resp: 16 SpO2: 93 % O2 Device: None (Room air)    Weight: 168 lbs 0 oz  General Appearance: Well developed, well nourished male in NAD, sitting at edge of bed  Respiratory: lungs CTAB, no wheezes or crackles, no tachypnea  Cardiovascular: RRR, normal s1/s2 without murmur  GI: abdomen soft, normal bowel sounds  Other: Alert, oriented to person, hospital and month    Data   Recent Labs   Lab 08/26/19  0722 08/25/19  1144 08/25/19  0620 08/24/19  2214 08/24/19  1825   WBC  --   --   --  6.7 7.2   HGB  --   --   --  12.1* 12.5*   MCV  --   --   --  90 90   PLT  --   --   --  177 175   INR  --   --   --   --  0.94     --  141 141 141   POTASSIUM 3.9  --  3.9 4.9 4.7   CHLORIDE 107  --  108 105 105   CO2 27  --  31 32 31   BUN 13  --  16 17 19   CR 0.65*  --  0.73 0.74 0.86   ANIONGAP 7  --  2* 4 5   ALLISON 9.4  --  9.0 9.7 9.8   *  --  103* 112* 133*   ALBUMIN  --   --  3.0*  --   --    PROTTOTAL  --   --  6.1*  --   --     BILITOTAL  --   --  0.4  --   --    ALKPHOS  --   --  68  --   --    ALT  --   --  13  --   --    AST  --   --  15  --   --    TROPI  --  <0.015 0.018 <0.015  --       none

## 2019-08-27 NOTE — CONSULTS
08/27/19 1200 Mikaela Fuentes, RN       Stroke Education Note     The following information has been reviewed with the family:     1. Warning signs of stroke     2. Calling 911 if having warning signs of stroke     3. All modifiable risk factors: hypertension, CAD, atrial fib, diabetes, hypercholesterolemia, smoking, substance abuse, diet, physical inactivity, obesity, sleep apnea.     4. Patient's risk factors for stroke which include: HTN, CAD, DM, HLD, previous TIA's      5. Follow-up plan for after discharge     6. Discharge medications which include: HTN, HLD, ASA,      In addition, the [facility] Stroke Class Handout has been given to the family.     Learner's response to risk factors / lifestyle modification education: Desire to change Ability to change Reasons to change Committment to change Activation Taking steps . Wife attended class alone. Pt. Sleeping due to procedure.      Mikaela Fuentes RN, RN      No education note entered.

## 2019-08-27 NOTE — PROGRESS NOTES
EEG CLINICAL NEUROPHYSIOLOGY PRELIMINARY REPORT    EEG through approx 8 AM today reviewed. There has been reduction in persistence of diffuse delta overnight. Continues with 7 Hz posterior dominant rhythm. As degree of difise slowing decreases, asymmetries in posteroir dominant rhythm become more apparent with some attenuation on the left. No epileptiform discharges or seizures at any point.    Overall there has been some imporvment in the degree of diffuse encephalopathy compared to last night tho findings still consistent with mild to moderate diffuse encephalopathy. Additional evidence of left hemispheric focal cerebral dysfunction, consistent with known structural disease in left hemisphere. No epileptiofrm discharges or seizures. Full report to follow.    Fer Blanco MD  Pager [phone]

## 2019-08-27 NOTE — PROGRESS NOTES
[facility] EEG #  [id number]  day2 started at 15:23.   Ordering MD is Elda Winter

## 2019-08-27 NOTE — PROGRESS NOTES
"[facility]    Vascular Neurology Progress Note      Interval History   No further events noted by family. Wife reports he looks good. Preliminarily no seizure on VEEG, shows just encephalopathy improving    Assessment & Plan     Impression:  Dustin Pearce is a 89 year old male PMHx of DM, HTN, HL, anxiety and depression, prior TIA, and other recent admissions X 2 for probable TIA vs seizure, CHF, severe 3V CAD, chronic L carotid artery occlusion, and h/o of R CEA, brought to ED for repeated 30 min -1 hour episodes of garbled speech.  Patient has had a thorough workup multiple times in past few months including MRI,EEG and vessel imaging, ziopatch for cardiac monitoring which has yet to be read . Medically maximized on on ASA 81, brilinta and also lipitor 40. As for as chart review shows, no episode of afib/flutter have been noted during his previous admissions.     Recommendations:  - Continue video EEG in an attempt to capture spells on video EEG which thus far has not yet happened  - Continue aspirin, brilinta, lipitor for stroke prevention  - Continue Keppra 750mg q12 hours  - Continue q4 hour neuro checks  - No need for repeat neuroimaging now    We will continue to follow.     Patient follow up:  - final recommendation pending work-up    Diane Miranda PA-C  Neurology  08/27/2019 1:45 PM  Text Page (8am-5pm)  To page stroke neurology after hours or on a subsequent day, click here: [facility]COM  Choose \"On Call\" tab at top, then search dropdown box for \"Neurology Adult\" & press Enter, look for Neuro ICU/Stroke    ____________________________________________________________________      Medications     Scheduled medications    aspirin  81 mg Oral Daily     atorvastatin  40 mg Oral Daily     DULoxetine  30 mg Oral Daily     insulin aspart   Subcutaneous TID w/meals     insulin aspart  1-7 Units Subcutaneous TID AC     insulin aspart  1-5 Units Subcutaneous At Bedtime     insulin glargine  18 Units " "Subcutaneous At Bedtime     levETIRAcetam  750 mg Oral BID     magnesium oxide  400 mg Oral BID     omeprazole  20 mg Oral QAM     tamsulosin  0.4 mg Oral At Bedtime     ticagrelor  90 mg Oral BID       Infusion medications    - MEDICATION INSTRUCTIONS -         PRN medications  Current Facility-Administered Medications   Medication Dose Route Frequency     acetaminophen  650 mg Rectal Q4H PRN     acetaminophen  500-1,000 mg Oral Q6H PRN     amitriptyline  25 mg Oral At Bedtime PRN     bisacodyl  10 mg Rectal Daily PRN     glucose  15-30 g Oral Q15 Min PRN    Or     dextrose  25-50 mL Intravenous Q15 Min PRN    Or     glucagon  1 mg Subcutaneous Q15 Min PRN     hydrALAZINE  10-20 mg Intravenous Q1H PRN     labetalol  10-40 mg Intravenous Q10 Min PRN     magnesium sulfate  2 g Intravenous Daily PRN     magnesium sulfate  4 g Intravenous Q4H PRN     - MEDICATION INSTRUCTIONS -   Does not apply Continuous PRN     melatonin  1 mg Oral At Bedtime PRN     naloxone  0.1-0.4 mg Intravenous Q2 Min PRN     nitroGLYcerin  0.4 mg Sublingual Q5 Min PRN     ondansetron  4 mg Oral Q6H PRN    Or     ondansetron  4 mg Intravenous Q6H PRN     polyethylene glycol  17 g Oral Daily PRN     polyethylene glycol-propylene glycol  2 drop Both Eyes Daily PRN     potassium chloride  20-40 mEq Oral or Feeding Tube Q2H PRN     potassium chloride with lidocaine  10 mEq Intravenous Q1H PRN     potassium chloride  10 mEq Intravenous Q1H PRN     potassium chloride  20 mEq Intravenous Q1H PRN     potassium chloride  20-40 mEq Oral Q2H PRN       Allergies   Allergies   Allergen Reactions     Oxycodone Other (See Comments)     \"TERRIBLE SWEATING\"     Sulfa Drugs      Tetracycline        Physical Exam     Temp:  [97.3  F (36.3  C)-98.1  F (36.7  C)] 97.3  F (36.3  C)  Pulse:  [81] 81  Heart Rate:  [79-92] 81  Resp:  [16-20] 16  BP: ()/(49-74) 96/49  SpO2:  [93 %-95 %] 93 %    Neurologic  Mental Status:  Alert, oriented to hospital, said month was " "July . He followed a 2 step command. Naming was about 50%, makes many paraphasic errors  Cranial Nerves:  visual fields intact, PERRL, EOMI with normal smooth pursuit, facial sensation intact and symmetric,mild left nasolabial fold flattening, hearing not formally tested but intact to conversation, palate elevation symmetric and uvula midline, mild nonspecific dysarthria, shoulder shrug strong bilaterally, tongue protrusion midline  Motor: 5/5 in all 4 ext  Sensory:  light touch sensation intact and symmetric throughout upper and lower extremities, no extinction to double simultaneous stimulation  Coordination:  does not participate  Station/Gait:  deferred      Data     Imaging  EEG until 8 am on 8/27:    \"There has been reduction in persistence of diffuse delta overnight. Continues with 7 Hz posterior dominant rhythm. As degree of difise slowing decreases, asymmetries in posteroir dominant rhythm become more apparent with some attenuation on the left. No epileptiform discharges or seizures at any point.     Overall there has been some imporvment in the degree of diffuse encephalopathy compared to last night tho findings still consistent with mild to moderate diffuse encephalopathy. Additional evidence of left hemispheric focal cerebral dysfunction, consistent with known structural disease in left hemisphere. No epileptiofrm discharges or seizures. Full report to follow.\"    Labs:  CBC:  Recent Labs   Lab 08/24/19  2214 08/24/19  1825   WBC 6.7 7.2   RBC 4.21* 4.45   HGB 12.1* 12.5*   HCT 37.8* 40.0    175       Basic Metabolic Panel:   Recent Labs   Lab Test 08/26/19  0722 08/25/19  0620    141   POTASSIUM 3.9 3.9   CHLORIDE 107 108   CO2 27 31   BUN 13 16   CR 0.65* 0.73   * 103*   ALLISON 9.4 9.0       Liver panel:  Recent Labs   Lab Test 08/25/19  0620 07/17/19  1548   PROTTOTAL 6.1* 6.5*   ALBUMIN 3.0* 3.2*   BILITOTAL 0.4 0.5   ALKPHOS 68 83   AST 15 17   ALT 13 18       INR:  Recent Labs "   Lab Test 08/24/19  1825 07/16/19  1739 06/30/19  2345   INR 0.94 0.98 1.01        Lipid Profile:  Recent Labs   Lab Test 08/25/19  0620 04/09/19  0626   CHOL 118 134   HDL 43 47   LDL 49 64   TRIG 130 113       A1C:   Recent Labs   Lab Test 07/01/19  0607 05/30/19  1854 02/28/19  1913   A1C 8.3* 8.8* 8.7*       Troponin I:   Recent Labs   Lab Test 08/25/19  1144 08/25/19  0620 08/24/19  2214   TROPI <0.015 0.018 <0.015

## 2019-08-27 NOTE — PLAN OF CARE
Pt alert, disoriented to situation at times. [place] and legally blind. Up with 2 + GB/walker, gait unstable at times. EEG monitoring on- showing decrease in encephalopathy. BGM, sliding scale insulin given and carb coverage added today. SW consult placed. VSS on RA, denies pain. Tele SR + BBB + PVCs. 4/5 strength in extremities + some word finding difficultites, neuro checks unchanged. Tolerating mod cho diet, good appetite. Voiding well in BR. Stroke education provided to pt spouse. Continuing with Brilinta medication- discharge date pending.

## 2019-08-27 NOTE — PLAN OF CARE
Pt disoriented to time and situation.  Some word finding difficulties otherwise neuros intact.  A1 cane.  Mod CHO diet.  .  Full code.  EEG in place.  [place] and legally blind.  Denied pain throughout shift.  No Neuro changes throughout shift.  VSS on RA.

## 2019-08-27 NOTE — PLAN OF CARE
Discharge Planner OT   Patient plan for discharge: Wife reports TCU  Current status: pt on 48hr EEG, nursing ok to see pt for OT session, pt required cues to initiate and min/mod A to complete supine to sit EOB, mod A sit to stand, pt with right lateral lean max cues with assist needed to right self back to midline. Pt amb with FWW max cues for walker safety with proximity to self and keeping within base of walker amb with mod A for  balance to bathroom, stood at sink with modA for balance and cues to stand upright as pt leans to right and leans on wall for support. Max cues with max A to safely turn around and amb from bathroom to sit EOB, Oni with sit to supine. Pt completed grooming task in bed with HOB elevated.   Barriers to return to prior living situation: Impaired ADL and mobility, level of assist with ADL, falls risk, impaired speech and cognition, wife works full-time and pt would be home alone if he went home.  Recommendations for discharge: TCU per plan established by the occupational Therapist  Rationale for recommendations: Pt not safe for discharge home currently, will be receiving further medical work-up to determine cause of his deficits, as his abilities have waxed and waned. Pt may be appropriate for ARU if he continues to have functional deficits with ADL, cognition, speech, and mobility.       Entered by: Diana Hoffmann 08/27/2019 8:31 AM

## 2019-08-28 NOTE — PROCEDURES
Procedure Date: 08/27/2019      LONG-TERM ELECTROENCEPHALOGRAM WITH VIDEO     EEG # [id number]      SOURCE FILE DURATION:  Video monitoring starts 08/26/2019 at 02:50:51 p.m. and ends on 08/27/2019 at 03:15:12 p.m.      HISTORY:  Video EEG monitoring performed on Dustin Prince, a 89-year-old with a known left frontal CVA who presents with alteration of mental status, especially including speech deficit.  He is currently being treated with levetiracetam.      TECHNICAL SUMMARY:  EEG is recorded from scalp electrodes placed according to the 10-20 international system.  Additional electrodes were utilized for referencing, artifact detection, and recording from other cerebral regions.  Video was continuously recorded.  Video was reviewed for clinical correlation and to assist with EEG interpretation.      FINDINGS:  On presentation, a 6 Hz posterior dominant rhythm, somewhat attenuated over the left.  There is extensive irregular diffuse slowing.  When patient is quiescent and presumably asleep, more pervasive slowing is seen.  As study continues, irregular delta slowing becomes less persistent.  A 6-7 Hz posterior dominant rhythm becomes more obvious.  This is attenuated over the left hemisphere.  As patient rests towards the end of the study, diffuse slowing is somewhat less persistent.  Delta slowing is rare while patient is clearly awake (eye blinking, increased myogenic activity, etc.).      OTHER INTERICTAL ABNORMALITIES:  No epileptiform discharges.      ICTAL ABNORMALITIES:  No electrographic seizures.      IMPRESSION:  Abnormal.  There is evidence of mild to moderate diffuse encephalopathy.  There is additional evidence of focal cerebral dysfunction over the left hemisphere.  This is consistent with known structural abnormality in this area.  There are some mild improvements in the degree of encephalopathy during this study.  Epileptiform discharges or seizures not seen at any point.         IFRAH DICK,  MD             D: 2019   T: 2019   MT: MARCELL      Name:     SID AGUIAR   MRN:      [id number]        Account:        [id number]   :      1928           Procedure Date: 2019      Document: [id number]

## 2019-08-28 NOTE — PLAN OF CARE
Pt here with TIA vs seizures. A&Ox3-4, forgetful to time. Neuros intact, except macular degeneration. VSS. Tele SD w/ BBB. Mod carb diet, thin liquids. Takes pills whole. Voiding adequately. Up with A2 w/ GB and walker. Denies pain. Pt scoring green on the Aggression Stop Light Tool. Plan for continuous EEG monitoring until TCU bed available. No episodes of speech issues noted this shift. Discharge likely to TCU tomorrow pending bed availability, nursing will continue to monitor.

## 2019-08-28 NOTE — PLAN OF CARE
Pt here with TIA vs seizures. A&Ox3 not to date. Neuros otherwise intact with unsteady gait, severe at times. VSS. Tele SR BBB PVC. Mod carb diet, thin liquids. Takes pills whole with water. Up with assist of 2, gait belt walker. Denies pain. Pt scoring green on the Aggression Stop Light Tool. Plan pending. Continues on EEG with no spells in this 4 hr period. Discharge pending.

## 2019-08-28 NOTE — PLAN OF CARE
A&Ox3, disoriented to time. Continuous EEG. Speech is clear and logical. Neuros intact ex macular degeneration. Tolerating diet. Up with Ax2 walker/GB. Denies pain. Voiding. Aggression tool: Green. Plan to discharge to [place] tomorrow am, transportation set up at 1030, awaiting discharge orders

## 2019-08-28 NOTE — PROGRESS NOTES
EEG CLINICAL NEUROPHYSIOLOGY PRELIMINARY REPORT    EEG through approx 10:30 AM reviewed. Continues with six to seven Hz posterior dominant rhythm, attenuated over left hemisphere. Study consistent with mild to moderate diffuse encephalopathy. Evidence of additional focal cerebral dysfunction over left hemisphere, consistent with known structural abnormality in this area. No epileptiform discharges or electrographic seizures. Full report to follow.    Fer Blanco MD  Pager [phone]

## 2019-08-28 NOTE — PLAN OF CARE
Pt here with seizures vs TIAs, occlusion of R ICA. Disoriented to time, forgetful, [place]. Neuros intact ex some very mild word-finding difficulty. VSS, ex BP soft in 100s/50s. Tele SR BBB with frequent PACs. EEG monitoring. Mod CHO diet with thin liquids. Takes pills whole. Up with Ax2 for unsteady, shuffling gait but did okay with Ax1 overnight. Continent of bowel and bladder. Denies pain. Pt scoring green on the Aggression Stop Light Tool. Plan follow-up with EEG monitoring. Discharge to TCU vs ARU pending.

## 2019-08-28 NOTE — PLAN OF CARE
Discharge Planner PT   Patient plan for discharge: TCU  Current status: Pt in bed upon PT arrival, remains on EEG but RN approved mobility. Pt is much more alert, conversant today. Pt requires SBA for bed mobility and CGA for transfers and gait 20' x 5 with a FWW. Much improved awareness of midline posture with less R lateral leaning this date.  Barriers to return to prior living situation: level of assist required, fall risk, limited mobility, stairs  Recommendations for discharge: TCU  Rationale for recommendations: Pt would benefit from PT at TCU to progress strength, balance and mobility so pt can return home safely.        Entered by: Zohreh Anderson 08/28/2019 5:11 PM

## 2019-08-28 NOTE — PROGRESS NOTES
Spoke with patient's GUZMAN Meade about scheduling neurology follow up.  Per Halina, he has an appointment with Dr. Padilla on September 16.

## 2019-08-28 NOTE — PLAN OF CARE
Discharge Planner OT   Patient plan for discharge: Wife reports TCU  Current status: Pt doing better today, completed supine to sit EOB with Oni, Oni of 2 sit to stand, pt able to amb with FWW CGA/Oni of 2 to/from bathroom stood at sink for ADLS CGA pt with improved standing balance and tolerance today for at sink ADLS. Pt tolerated greater than 15 mins of standing for ADLS. Min/mod A for safety with turning to sit EOB and SBA sit to supine.   Barriers to return to prior living situation: Impaired ADL and mobility, level of assist with ADL, falls risk, impaired speech and cognition, wife works full-time and pt would be home alone if he went home.  Recommendations for discharge: TCU per plan established by the occupational Therapist  Rationale for recommendations: Pt not safe for discharge home currently, will be receiving further medical work-up to determine cause of his deficits, as his abilities have waxed and waned. Pt may be appropriate for ARU if he continues to have functional deficits with ADL, cognition, speech, and mobility.       Entered by: Diana Hoffmann 08/28/2019 11:12 AM

## 2019-08-28 NOTE — PROGRESS NOTES
"[facility]    Vascular Neurology Progress Note      Interval History   No further events noted by family. EEG continues to be normal. Wife reports speech is better    Assessment & Plan     Impression:  Dustin Pearce is a 89 year old male PMHx of DM, HTN, HL, anxiety and depression, prior TIA, and other recent admissions X 2 for probable TIA vs seizure, CHF, severe 3V CAD, chronic L carotid artery occlusion, and h/o of R CEA, brought to ED for repeated 30 min -1 hour episodes of garbled speech.  Patient has had a thorough workup multiple times in past few months including MRI,EEG and vessel imaging, ziopatch for cardiac monitoring which has yet to be read . Medically maximized on on ASA 81, brilinta and also lipitor 40. As for as chart review shows, no episode of afib/flutter have been noted during his previous admissions.   Differential includes focal seizure and TIA    Recommendations:  - Continue video EEG until discharge in an attempt to capture spells on video EEG which thus far has not yet happened. Consider prolonged outpatient ambulatory EEG after discharge - follow up with KEITH Farah for discharge today if bed available.  - Continue aspirin, brilinta, lipitor for stroke prevention  - Continue Keppra 500mg q12 hours. Reduced dose yesterday to help with tiredness  - Continue q4 hour neuro checks    Will sign off and review final EEG readings peripherally, please call with any questions    Patient follow up:  - in 2-4 weeks with local neurologist    Diane Miranda PA-C  Neurology  08/28/2019 11:33 AM  Text Page (8am-5pm)  To page stroke neurology after hours or on a subsequent day, click here: [facility]  Choose \"On Call\" tab at top, then search dropdown box for \"Neurology Adult\" & press Enter, look for Neuro ICU/Stroke    ____________________________________________________________________      Medications     Scheduled medications    aspirin  81 mg Oral Daily     atorvastatin  40 mg Oral Daily " "    DULoxetine  30 mg Oral Daily     insulin aspart   Subcutaneous TID w/meals     insulin aspart  1-7 Units Subcutaneous TID AC     insulin aspart  1-5 Units Subcutaneous At Bedtime     insulin glargine  18 Units Subcutaneous At Bedtime     levETIRAcetam  500 mg Oral BID     magnesium oxide  400 mg Oral BID     omeprazole  20 mg Oral QAM     tamsulosin  0.4 mg Oral At Bedtime     ticagrelor  90 mg Oral BID       Infusion medications    - MEDICATION INSTRUCTIONS -         PRN medications  Current Facility-Administered Medications   Medication Dose Route Frequency     acetaminophen  650 mg Rectal Q4H PRN     acetaminophen  500-1,000 mg Oral Q6H PRN     amitriptyline  25 mg Oral At Bedtime PRN     bisacodyl  10 mg Rectal Daily PRN     glucose  15-30 g Oral Q15 Min PRN    Or     dextrose  25-50 mL Intravenous Q15 Min PRN    Or     glucagon  1 mg Subcutaneous Q15 Min PRN     hydrALAZINE  10-20 mg Intravenous Q1H PRN     labetalol  10-40 mg Intravenous Q10 Min PRN     magnesium sulfate  2 g Intravenous Daily PRN     magnesium sulfate  4 g Intravenous Q4H PRN     - MEDICATION INSTRUCTIONS -   Does not apply Continuous PRN     melatonin  1 mg Oral At Bedtime PRN     naloxone  0.1-0.4 mg Intravenous Q2 Min PRN     nitroGLYcerin  0.4 mg Sublingual Q5 Min PRN     ondansetron  4 mg Oral Q6H PRN    Or     ondansetron  4 mg Intravenous Q6H PRN     polyethylene glycol  17 g Oral Daily PRN     polyethylene glycol-propylene glycol  2 drop Both Eyes Daily PRN     potassium chloride  20-40 mEq Oral or Feeding Tube Q2H PRN     potassium chloride with lidocaine  10 mEq Intravenous Q1H PRN     potassium chloride  10 mEq Intravenous Q1H PRN     potassium chloride  20 mEq Intravenous Q1H PRN     potassium chloride  20-40 mEq Oral Q2H PRN       Allergies   Allergies   Allergen Reactions     Oxycodone Other (See Comments)     \"TERRIBLE SWEATING\"     Sulfa Drugs      Tetracycline        Physical Exam     Temp:  [97.3  F (36.3  C)-98.1  F " (36.7  C)] 98.1  F (36.7  C)  Pulse:  [55-81] 55  Heart Rate:  [76-90] 76  Resp:  [16] 16  BP: ()/(49-61) 111/58  SpO2:  [93 %-97 %] 94 %    Neurologic  Mental Status:  Alert, oriented to hospital, said month was August . He followed a 2 step command. Naming was about 75%, makes many paraphasic errors  Cranial Nerves:  visual fields intact, PERRL, EOMI with normal smooth pursuit, facial sensation intact and symmetric,mild left nasolabial fold flattening, hearing diminished, palate elevation symmetric and uvula midline, mild nonspecific dysarthria, shoulder shrug strong bilaterally, tongue protrusion midline  Motor: 5/5 in all 4 ext  Sensory:  light touch sensation intact and symmetric throughout upper and lower extremities, no extinction to double simultaneous stimulation  Coordination:  FTN testing shows no ataxia  Station/Gait:  deferred      Data     Imaging  EEG until 3pm on 8/27:    IMPRESSION:  Abnormal.  There is evidence of mild to moderate diffuse encephalopathy.  There is additional evidence of focal cerebral dysfunction over the left hemisphere.  This is consistent with known structural abnormality in this area.  There are some mild improvements in the degree of encephalopathy during this study.  Epileptiform discharges or seizures not seen at any point.     Labs:  CBC:  Recent Labs   Lab 08/28/19  0724 08/24/19 2214 08/24/19  1825   WBC 5.8 6.7 7.2   RBC 3.86* 4.21* 4.45   HGB 11.0* 12.1* 12.5*   HCT 34.4* 37.8* 40.0   * 177 175       Basic Metabolic Panel:   Recent Labs   Lab Test 08/28/19  0724 08/26/19  0722    141   POTASSIUM 3.9 3.9   CHLORIDE 108 107   CO2 28 27   BUN 19 13   CR 0.72 0.65*   * 150*   ALLISON 9.2 9.4       Liver panel:  Recent Labs   Lab Test 08/25/19  0620 07/17/19  1548   PROTTOTAL 6.1* 6.5*   ALBUMIN 3.0* 3.2*   BILITOTAL 0.4 0.5   ALKPHOS 68 83   AST 15 17   ALT 13 18       INR:  Recent Labs   Lab Test 08/24/19  1825 07/16/19  1739 06/30/19  2345   INR  0.94 0.98 1.01        Lipid Profile:  Recent Labs   Lab Test 08/25/19  0620 04/09/19  0626   CHOL 118 134   HDL 43 47   LDL 49 64   TRIG 130 113       A1C:   Recent Labs   Lab Test 07/01/19  0607 05/30/19  1854 02/28/19  1913   A1C 8.3* 8.8* 8.7*       Troponin I:   Recent Labs   Lab Test 08/25/19  1144 08/25/19  0620 08/24/19  2214   TROPI <0.015 0.018 <0.015

## 2019-08-28 NOTE — PROCEDURES
EEG # [facility] -2.      TYPE OF STUDY: Inpatient video-EEG monitoring     DATE OF STUDY:  2019.       SOURCE FILE DURATION:  Video EEG starts on 2019 at 03:23:45 p.m. and ends on 2018 at 03:37:04 p.m.        HISTORY:  This EEG day 2 of video monitoring is performed on Dustin Aguiar, a 89-year-old with  known left frontal CVA who presents with altered mental status including speech deficit.  He is currently being treated with levetiracetam.      TECHNICAL SUMMARY:  EEG is recorded from scalp electrodes placed according to the 10-20 international system.  Additional electrodes were utilized for referencing, artifact detection, and recording from other cerebral regions.  Video was continuously recorded.  Video was reviewed for clinical correlation and to assist with EEG interpretation.      FINDINGS:  6 Hz of posterior dominant rhythm predominates.  This is attenuated over the left hemisphere.  Occasional irregular delta is seen during wakefulness, but this is sparse.  With sleep, somewhat more slowing is noted.  There is absence of eye blinks.  However, vertex waves or sleep spindles are not seen.      Hyperventilation and photic stimulation are not performed.      OTHER INTERICTAL ABNORMALITIES:  No epileptiform discharges.      ICTAL ABNORMALITIES:  No electrographic seizures.      IMPRESSION:  Abnormal.  There is evidence of mild to moderate diffuse encephalopathy.  There is additional evidence of focal cerebral dysfunction over the left hemisphere, consistent with known structural abnormality in this area.  Epileptiform discharges or seizures not seen during this study.         IFRAH DICK MD             D: 2019   T: 2019   MT:       Name:     DUSTIN AGUIAR   MRN:      [id number]        Account:        [id number]   :      1928           Procedure Date: 2019      Document: [id number]

## 2019-08-28 NOTE — PROGRESS NOTES
[facility]    Medicine Progress Note - Hospitalist Service       Date of Admission:  8/24/2019  Assessment & Plan   Dustin Pearce is a 89 year old male admitted on 8/24/2019.  PMH significant for IDDM, HTN, HL, anxiety and depression, prior TIA, recent admissions x2 for probable TIA vs seizure, chronic systolic CHF, CAD s/p PCI, chronic left carotid artery occlusion, h/o of right CEA.  He was brought to the ER by his wife for evaluation of slurred speech.     Slurred speech  Two recent presentations for expressive aphasia and dysarthria 7/1/19 and 7/16/19 with extensive work-up including head CT, MRI, EEG, negative TTE with bubble.  Keppra was increased, though no seizure activity witnessed or seen on EEG.  Continued on Plavix.  Presenting again with similar symptoms with CTA unchanged from prior, CT/CTP without acute pathology.  P2Y12 level suggests inadequate response to Plavix - switched to Brilinta 8/25 Has experienced recurrent symptoms following arrival with RRT's called 8/25 and 8/26.  - Continue PTA aspirin, statin, Brilinta  - Continue Keppra  - continuous EEG monitoring (initiated 8/26) without seizure thus far  - discussed with neuro 8/28, primary suspicion for undetected seizure activity as etiology for symptoms and recommend continuing with Keppra (note dose reduction today) with close follow up with [facility] in 1-2 weeks (appt already made per SO)    Chronic systolic CHF  Severe 3-vessel CAD s/p PCI to proximal LAD 5/2018  PAD s/p right CEA and chronic left ICA occlusion  Hx TIA/CVA  LVEF 40-45%, grade 1 diastolic dysfunction on TTE 8/5/19.    - continue PTA aspirin and atorvastatin  - Not on diuretic at baseline     IDDM  HbA1c 8.3% 7/1/19.  - Lantus 18 units nightly with medium ssi  - prandial insulin 1 unit/carb unit tid      Hypertension.    - Hold PTA lisinopril and Toprol as currently normotensive  - Labetalol and hydralazine p.r.n. available     Seizure disorder  Keppra decreased to  500 bid 8/28 per neuro     GERD  Continue Prilosec.      BPH  Continue Flomax.      Depression  Continue Cymbalta.              Diet: Moderate Consistent CHO Diet    DVT Prophylaxis: Pneumatic Compression Devices  Luis Catheter: not present  Code Status: Full Code      Disposition Plan   Expected discharge: To TCU tomorrow  Entered: Blaze Rose MD 08/28/2019, 6:35 PM       The patient's care was discussed with the Bedside Nurse, Patient and Patient's Family.    Blaze Rose MD  Hospitalist Service  [facility]    ______________________________________________________________________    Interval History   He offers no complaints, denies any focal neurologic symptoms or confusion.  SO reports he is near baseline at this time.  Denies any fever/chills, chest pressure or dyspnea.     Data reviewed today: I reviewed all medications, new labs and imaging results over the last 24 hours. I personally reviewed no images or EKG's today.    Physical Exam   Vital Signs: Temp: 97.7  F (36.5  C) Temp src: Oral BP: 125/59 Pulse: 55 Heart Rate: 82 Resp: 16 SpO2: 97 % O2 Device: None (Room air)    Weight: 168 lbs 0 oz  General Appearance: Well developed, well nourished male in NAD, lying in bed  Respiratory: lungs CTAB, no wheezes or crackles, no tachypnea  Cardiovascular: RRR, normal s1/s2 without murmur  GI: abdomen soft, normal bowel sounds  Other: Alert and appropriate, cranial nerves grossly intact    Data   Recent Labs   Lab 08/28/19  0724 08/26/19  0722 08/25/19  1144 08/25/19  0620 08/24/19  2214 08/24/19  1825   WBC 5.8  --   --   --  6.7 7.2   HGB 11.0*  --   --   --  12.1* 12.5*   MCV 89  --   --   --  90 90   *  --   --   --  177 175   INR  --   --   --   --   --  0.94    141  --  141 141 141   POTASSIUM 3.9 3.9  --  3.9 4.9 4.7   CHLORIDE 108 107  --  108 105 105   CO2 28 27  --  31 32 31   BUN 19 13  --  16 17 19   CR 0.72 0.65*  --  0.73 0.74 0.86   ANIONGAP 5 7  --  2* 4 5   ALLISON 9.2  9.4  --  9.0 9.7 9.8   * 150*  --  103* 112* 133*   ALBUMIN  --   --   --  3.0*  --   --    PROTTOTAL  --   --   --  6.1*  --   --    BILITOTAL  --   --   --  0.4  --   --    ALKPHOS  --   --   --  68  --   --    ALT  --   --   --  13  --   --    AST  --   --   --  15  --   --    TROPI  --   --  <0.015 0.018 <0.015  --

## 2019-08-29 NOTE — LETTER
[facility]   To:             Please give to facility    From:   Farnaz Eddy RN, Care Coordinator   [facility] -Care Coordination  [facility]   E-mail [email or web address]   [phone]   Patient Name:  Dustin Pearce  YOB: 1928   Admit date: 8/29/2019      *Information Needed:  Please contact me when the patient will discharge (or if they will move to long term care)- include the discharge date, disposition, and main diagnosis   - If the patient is discharged with home care services, please provide the name of the agency    Also- Please inform me if a care conference is being held.   Farnaz Eddy RN, Care Coordinator   [facility] -Care Coordination  [facility]   E-mail [email or web address]   [phone]                               Thank you

## 2019-08-29 NOTE — PLAN OF CARE
Pt here with TIA vs seizures. A&Ox4. Neuros otherwise intact gait seems improved tonight. VSS. Tele SR BBB PVC. Mod carb diet, thin liquids. Takes pills whole with water. Up with assist of 1, gait belt walker. Denies pain. Pt scoring green on the Aggression Stop Light Tool. Plan pending. Continues on EEG with no spells in this 4 hr period. Discharge to [place] tomorrow morning at 1030.

## 2019-08-29 NOTE — PLAN OF CARE
Pt here with seizures, AXOx3-4, did not know the year. Neuros intact otherwise. VSS on RA. Tele SR with BBB and occasional PVC. Mod carb diet. Ax1, GB/walker. Pt scoring green on aggression stop light tool. Pt currently on EEG and to continue till discharge. Planned discharge to [place] in the AM.

## 2019-08-29 NOTE — DISCHARGE SUMMARY
[facility]  Hospitalist Discharge Summary       Date of Admission:  8/24/2019  Date of Discharge:  8/29/2019  Discharging Provider: Blaze Rose MD      Discharge Diagnoses   Slurred speech suspected due to seizure  Chronic systolic CHF  Hx severe 3-vessel CAD s/p PCI  Hx PAD s/p right CEA  Hx chronic left ICA occlusion  DM II  HTN  Seizure disorder  GERD  BPH  Depression    Follow-ups Needed After Discharge   Follow-up Appointments     Follow Up and recommended labs and tests      Follow up with Nursing home physician.  No follow up labs or test are   needed.  Follow up with [facility] as currently scheduled.             Discharge Disposition   Discharged to nursing home  Condition at discharge: Stable    Hospital Course      Dustin Pearce is a 89 year old male admitted on 8/24/2019.  PMH significant for IDDM, HTN, HL, anxiety and depression, prior TIA, recent admissions x2 for probable TIA vs seizure, chronic systolic CHF, CAD s/p PCI, chronic left carotid artery occlusion, h/o of right CEA.  He was brought to the ER by his wife for evaluation of slurred speech.     Slurred speech, suspected seizure  Hx seizure disorder  Two recent presentations for expressive aphasia and dysarthria 7/1/19 and 7/16/19 with extensive work-up including head CT, MRI, EEG, negative TTE with bubble.  Keppra was increased prior admission, though no seizure activity witnessed or seen on EEG.  Presenting again with similar symptoms with CTA unchanged from prior, CT/CTP without acute pathology.  P2Y12 level suggests inadequate response to Plavix - switched to Brilinta 8/25.  He experienced recurrent symptoms following arrival with RRT's called 8/25 and 8/26.  Continuous EEG monitoring starting 8/26 through discharge remained negative for seizure activity, but there were also no recurrent events during this time.  Neurology suspects seizure as likely etiology of symptoms.  Keppra was decreased to 500  mg bid 8/27 due to fatigue.  Recommend close follow up with [facility] with consideration of ambulatory EEG monitoring.      Chronic systolic CHF  Severe 3-vessel CAD s/p PCI to proximal LAD 5/2018  PAD s/p right CEA and chronic left ICA occlusion  Hx TIA/CVA  HTN  LVEF 40-45%, grade 1 diastolic dysfunction on TTE 8/5/19.  PTA lisinopril and Toprol were initially held for permissive HTN, but continued to be held as pressures remained somewhat soft.  Trending up at time of discharge, will resume PTA metoprolol.  Continue to hold lisinopril 2.5 mg daily and resume if tolerated given hx CHF.  PTA Plavix was switched to Brilinta as above.  Continue PTA aspirin and atorvastatin.       IDDM  HbA1c 8.3% 7/1/19.  Will continue PTA regimen of Lantus and metformin.     GERD  Continue Prilosec.      BPH  Continue Flomax.      Depression  Continue Cymbalta.           Consultations This Hospital Stay   NEUROLOGY IP CONSULT  PHYSICAL THERAPY ADULT IP CONSULT  OCCUPATIONAL THERAPY ADULT IP CONSULT  SPEECH LANGUAGE PATH ADULT IP CONSULT  SWALLOW EVAL SPEECH PATH AT BEDSIDE IP CONSULT  SMOKING CESSATION PROGRAM IP CONSULT  PATIENT LEARNING CENTER IP CONSULT  SOCIAL WORK IP CONSULT  OCCUPATIONAL THERAPY ADULT IP CONSULT  PHYSICAL THERAPY ADULT IP CONSULT    Code Status   Full Code    Time Spent on this Encounter   I, Blaze Rose MD, personally saw the patient today and spent greater than 30 minutes discharging this patient.       Blaze Rose MD  [facility]  ______________________________________________________________________    Physical Exam   Vital Signs: Temp: 99  F (37.2  C) Temp src: Oral BP: 110/43   Heart Rate: 91 Resp: 16 SpO2: 96 % O2 Device: None (Room air)    Weight: 168 lbs 0 oz  General Appearance: Well developed, well nourished male in NAD  Respiratory: lungs CTAB, no wheezes or crackles  Cardiovascular: RRR, normal s1/s2 without murmur  GI: abdomen soft, nontender, normal  bowel sounds  Other: Alert and appropriate, oriented to person, hospital and date        Primary Care Physician   Matt Morrissey    Discharge Orders      General info for SNF    Length of Stay Estimate: Short Term Care: Estimated # of Days <30  Condition at Discharge: Improving  Level of care:skilled   Rehabilitation Potential: Good  Admission H&P remains valid and up-to-date: Yes  Recent Chemotherapy: N/A  Use Nursing Home Standing Orders: Yes     Mantoux instructions    Give two-step Mantoux (PPD) Per Facility Policy Yes     Reason for your hospital stay    You were admitted for slurred speech, suspected due to seizure.     Glucose monitor nursing POCT    Before meals and at bedtime     Daily weights    Call Provider for weight gain of more than 2 pounds per day or 5 pounds per week.     Follow Up and recommended labs and tests    Follow up with Nursing home physician.  No follow up labs or test are needed.  Follow up with [facility] as currently scheduled.     Activity - Up with nursing assistance     Wound care    Site:   Right upper neck  Instructions:  Leave dressing intact until seen in dermatology follow up.  If fluid builds up under dressing, it may be replaced with extra dressing provided to significant other.     Full Code     Occupational Therapy Adult Consult    Evaluate and treat as clinically indicated.    Reason:  deconditioning     Physical Therapy Adult Consult    Evaluate and treat as clinically indicated.    Reason:  deconditioning     Fall precautions     Advance Diet as Tolerated    Follow this diet upon discharge:       Moderate Consistent CHO Diet       Significant Results and Procedures   Most Recent 3 CBC's:  Recent Labs   Lab Test 08/28/19  0724 08/24/19  2214 08/24/19  1825   WBC 5.8 6.7 7.2   HGB 11.0* 12.1* 12.5*   MCV 89 90 90   * 177 175     Most Recent 3 BMP's:  Recent Labs   Lab Test 08/29/19  0542 08/28/19  0724 08/26/19  0722 08/25/19  0620   NA  --   141 141 141   POTASSIUM 4.2 3.9 3.9 3.9   CHLORIDE  --  108 107 108   CO2  --  28 27 31   BUN  --  19 13 16   CR  --  0.72 0.65* 0.73   ANIONGAP  --  5 7 2*   ALLISON  --  9.2 9.4 9.0   GLC  --  174* 150* 103*     Most Recent 2 LFT's:  Recent Labs   Lab Test 08/25/19  0620 07/17/19  1548   AST 15 17   ALT 13 18   ALKPHOS 68 83   BILITOTAL 0.4 0.5   ,   Results for orders placed or performed during the hospital encounter of 08/24/19   CTA Head Neck with Contrast    Narrative    CT ANGIOGRAM OF THE HEAD AND NECK WITHOUT AND WITH CONTRAST  8/24/2019  6:45 PM     COMPARISON: Head and neck CT angiogram 7/16/2019.    HISTORY: Confusion. Word finding difficulties.    TECHNIQUE:  Precontrast localizing scans were followed by CT  angiography with an injection of 70 mL Isouve-370 nonionic intravenous  contrast material with scans through the head and neck.  Images were  transferred to a separate 3-D workstation where multiplanar  reformations and 3-D images were created.  Estimates of carotid  stenoses are made relative to the distal internal carotid artery  diameters except as noted.      FINDINGS:   Neck CTA: There is mild atherosclerotic narrowing at the origin of the  left common carotid artery. There is mild atherosclerotic narrowing of  the midportion of the left common carotid artery. The right common  carotid artery is patent without stenosis. Again noted is complete  occlusion of the left internal carotid artery from the origin to the  left carotid terminus. The right internal carotid artery is tortuous  but is patent without stenosis by NASCET criteria. The vertebral  arteries bilaterally are patent without stenosis.    Head CTA: There is calcified atherosclerotic plaque of the  intracranial distal right internal carotid artery that does not result  in any vascular narrowing. The intracranial left internal carotid  artery is occluded. Again noted is irregularity in contour and mild  narrowing of the distal P2 segment of  the left posterior cerebral  artery that is likely atherosclerotic in nature. The left posterior  cerebral artery and its branches are otherwise patent and  unremarkable. The basilar, bilateral anterior cerebral, bilateral  middle cerebral and right posterior cerebral arteries are patent and  unremarkable. The anterior communicating and bilateral posterior  communicating arteries are patent.      Impression    IMPRESSION:  1. Complete occlusion of the left internal carotid artery from the  origin to the left carotid terminus again noted.  2. Presumed mild atherosclerotic narrowing of the distal P2 segment of  the left posterior cerebral artery again noted.  3. Plaque without stenosis of the intracranial distal right internal  carotid artery again noted.  4. Otherwise, normal neck and head CTA.      Radiation dose for this scan was reduced using automated exposure  control, adjustment of the mA and/or kV according to patient size, or  iterative reconstruction technique    LISANDRO MAGALLON MD   CT Head w Contrast    Narrative    CT BRAIN PERFUSION 8/24/2019 6:55 PM    COMPARISON: None.    HISTORY: Word finding difficulties. Confusion.    TECHNIQUE: Time sequential axial CT images of the head were acquired  during the administration of intravenous contrast (50 mL Isovue-370).  CTA images of the [place] as well as color perfusion maps of  the brain were created from this time sequential axial source data.      Impression    IMPRESSION: There is decreased cerebral blood flow and decreased  cerebral blood volume within and at the margins of the chronic  ischemic infarct at the anterolateral aspect of the left frontal lobe.  No other perfusion defects. No evidence for acute ischemia.    IMPRESSION: Normal CT perfusion of the brain.      Radiation dose for this scan was reduced using automated exposure  control, adjustment of the mA and/or kV according to patient size, or  iterative reconstruction  technique    LISANDRO MAGALLON MD   CT Head w/o Contrast    Narrative    CT OF THE HEAD WITHOUT CONTRAST 8/24/2019 6:40 PM     COMPARISON: Brain MR 7/17/2019.    HISTORY: Word finding difficulties.    TECHNIQUE: 5 mm thick axial CT images of the head were acquired  without IV contrast material.    FINDINGS: There is moderate diffuse cerebral volume loss. There are  subtle patchy areas of decreased density in the cerebral white matter  bilaterally that are consistent with sequela of chronic small vessel  ischemic disease. Chronic encephalomalacia involving the anterolateral  aspect of the left frontal lobe and anterior aspect of the insula is  again noted without change.    The ventricles and basal cisterns are within normal limits in  configuration given the degree of cerebral volume loss.  There is no  midline shift. There are no extra-axial fluid collections.    No intracranial hemorrhage, mass or recent infarct.    The visualized paranasal sinuses are well-aerated. There is no  mastoiditis. There are no fractures of the visualized bones.      Impression    IMPRESSION: Diffuse cerebral volume loss and cerebral white matter  changes consistent with chronic small vessel ischemic disease. No  evidence for acute intracranial pathology.  No change from the  comparison study.      Radiation dose for this scan was reduced using automated exposure  control, adjustment of the mA and/or kV according to patient size, or  iterative reconstruction technique    LISANDRO MAGALLON MD       Discharge Medications   Current Discharge Medication List      START taking these medications    Details   ticagrelor (BRILINTA) 90 MG tablet Take 1 tablet (90 mg) by mouth 2 times daily    Associated Diagnoses: TIA (transient ischemic attack)         CONTINUE these medications which have CHANGED    Details   levETIRAcetam (KEPPRA) 500 MG tablet Take 1 tablet (500 mg) by mouth 2 times daily    Associated Diagnoses: Seizure disorder (H)          CONTINUE these medications which have NOT CHANGED    Details   acetaminophen (TYLENOL) 500 MG tablet Take 500-1,000 mg by mouth every 6 hours as needed for mild pain      AMITRIPTYLINE HCL PO Take 25 mg by mouth nightly as needed for sleep      aspirin EC 81 MG EC tablet Take 1 tablet (81 mg) by mouth daily  Qty: 30 tablet, Refills: 0    Associated Diagnoses: Transient cerebral ischemia, unspecified type      atorvastatin (LIPITOR) 20 MG tablet Take 2 tablets (40 mg) by mouth daily  Qty: 30 tablet, Refills: 0    Comments: Future refills by PCP Dr. Matt Morrissey with phone number [phone].  Associated Diagnoses: Expressive aphasia      bisacodyl (DULCOLAX) 10 MG Suppository Place 10 mg rectally daily as needed for constipation      DULoxetine (CYMBALTA) 30 MG EC capsule TAKE 1 CAPSULE(30 MG) BY MOUTH DAILY  Qty: 90 capsule, Refills: 3    Associated Diagnoses: Polyneuropathy associated with underlying disease (H)      insulin glargine (BASAGLAR KWIKPEN) 100 UNIT/ML pen Inject 26 Units Subcutaneous At Bedtime  Qty: 15 mL, Refills: 11    Associated Diagnoses: Type 2 diabetes mellitus with hyperglycemia, with long-term current use of insulin (H)      ipratropium (ATROVENT) 0.03 % nasal spray INHALE 1 SPRAY IN EACH NOSTRIL EVERY 12 HOURS AS NEEDED  Qty: 30 mL, Refills: 11    Associated Diagnoses: Runny nose      MAGNESIUM-OXIDE 400 (241.3 Mg) MG tablet TAKE 1 TABLET BY MOUTH TWICE DAILY  Qty: 180 tablet, Refills: 1    Associated Diagnoses: Constipation, unspecified constipation type      metFORMIN (GLUCOPHAGE) 1000 MG tablet Take 1 tablet (1,000 mg) by mouth 2 times daily (with meals)  Qty: 180 tablet, Refills: 3    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 If  "symptoms persist 5 minutes after 1st dose call 911.  Qty: 25 tablet, Refills: 0    Associated Diagnoses: Coronary artery disease involving native heart, angina presence unspecified, unspecified vessel or lesion type      omeprazole (PRILOSEC) 40 MG DR capsule TAKE 1 CAPSULE(40 MG) BY MOUTH DAILY 30 TO 60 MINUTES BEFORE A MEAL  Qty: 90 capsule, Refills: 3    Associated Diagnoses: Other acute gastritis without hemorrhage      polyethylene glycol (MIRALAX/GLYCOLAX) Packet Take 17 g by mouth daily as needed for constipation      polyethylene glycol-propylene glycol (SYSTANE ULTRA) 0.4-0.3 % SOLN ophthalmic solution Place 2 drops into both eyes daily as needed for dry eyes      tamsulosin (FLOMAX) 0.4 MG capsule Take 0.4 mg by mouth At Bedtime      blood glucose monitoring (NO BRAND SPECIFIED) meter device kit Use to test blood sugar bid times daily or as directed.  Qty: 1 kit, Refills: 0    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)      blood glucose monitoring (TRUE METRIX BLOOD GLUCOSE TEST) test strip USE TO TEST BLOOD SUGAR THREE TIMES DAILY OR AS DIRECTED  Qty: 300 strip, Refills: 1    Associated Diagnoses: Hypoglycemia      order for DME Equipment being ordered: True Matrix Blood Glucose meter.  Qty: 1 Act, Refills: 11    Associated Diagnoses: Type 2 diabetes mellitus with complication, with long-term current use of insulin (H)         STOP taking these medications       clopidogrel (PLAVIX) 75 MG tablet Comments:   Reason for Stopping:         lisinopril (PRINIVIL/ZESTRIL) 2.5 MG tablet Comments:   Reason for Stopping:             Allergies   Allergies   Allergen Reactions     Oxycodone Other (See Comments)     \"TERRIBLE SWEATING\"     Sulfa Drugs      Tetracycline      "

## 2019-08-29 NOTE — PLAN OF CARE
OT: pt discharging shortly to TCU, will defer further OT to next level of care, GOALS NOT MET, see discharge summary

## 2019-08-29 NOTE — PROGRESS NOTES
Requested to reschedule Dermatology appt that he had missed this week.  Spoke with GUZMAN Meade and she prefers to schedule this as she manages his appointments.

## 2019-08-29 NOTE — PROGRESS NOTES
Clinic Care Coordination Contact  Care Coordination Transition Communication    Referral Source: IP Report    Clinical Data: Patient was hospitalized at [facility] admission  from 8/24-8/29/2019- with diagnosis of   Slurred speech suspected due to seizure  Chronic systolic CHF  Hx severe 3-vessel CAD s/p PCI  Hx PAD s/p right CEA  Hx chronic left ICA occlusion  DM II  HTN  Seizure disorder  GERD  BPH  Depression     Transition to Facility:              Facility Name: [facility]               Contact name and phone number/fax: CC faxed contact information to the facility to call back when the patient is discharged from TCU     Plan: RN/SW Care Coordinator will await notification from facility staff informing RN/SW Care Coordinator of patient's discharge plans/needs. RN/SW Care Coordinator will review chart and outreach to facility staff every 4 weeks and as needed.     Farnaz Eddy RN, Care Coordinator   [facility] -Care Coordination  [facility] [place]   [phone]

## 2019-08-29 NOTE — PLAN OF CARE
Physical Therapy Discharge Summary    Reason for therapy discharge:    Discharged to transitional care facility.    Progress towards therapy goal(s). See goals on Care Plan in [facility] electronic health record for goal details.  Goals not met.  Barriers to achieving goals:   discharge from facility.    Therapy recommendation(s):    Continued therapy is recommended.  Rationale/Recommendations:  Pt is making progress with mobility and deficits are improving but are still below baseline and require skilled PT to increase functional independence.

## 2019-08-29 NOTE — PLAN OF CARE
Pt here with TIA vs seizure. A&Ox3-4, forgetful. Neuros intact, ex macular degeneration at baseline. VSS. Tele SR w/ BBB and frequent PVCs. Mod carb diet, thin liquids. Takes pills whole. Voiding adequately. Up with A1 w/ GB and walker. Denies pain. Pt scoring green on the Aggression Stop Light Tool. Discharge to [facility] at 1030 via facility transporter.

## 2019-08-30 PROBLEM — Z98.890 HISTORY OF CEA (CAROTID ENDARTERECTOMY): Status: ACTIVE | Noted: 2019-01-01

## 2019-08-30 PROBLEM — R29.810 FACIAL DROOP: Status: ACTIVE | Noted: 2019-01-01

## 2019-08-30 PROBLEM — I50.42 CHRONIC COMBINED SYSTOLIC AND DIASTOLIC CONGESTIVE HEART FAILURE (H): Status: ACTIVE | Noted: 2019-01-01

## 2019-08-30 PROBLEM — R41.89 SPELL OF ALTERED COGNITION: Status: ACTIVE | Noted: 2019-01-01

## 2019-08-30 PROBLEM — R47.1 DYSARTHRIA: Status: ACTIVE | Noted: 2019-01-01

## 2019-08-30 PROBLEM — I65.22 OCCLUSION OF LEFT INTERNAL CAROTID ARTERY: Status: ACTIVE | Noted: 2019-01-01

## 2019-08-30 PROBLEM — R53.81 DEBILITY: Status: ACTIVE | Noted: 2019-01-01

## 2019-08-30 PROBLEM — R47.89 SPELLS OF SPEECH ARREST: Status: ACTIVE | Noted: 2019-01-01

## 2019-08-30 PROBLEM — I25.10 CORONARY ARTERY DISEASE INVOLVING NATIVE HEART WITHOUT ANGINA PECTORIS, UNSPECIFIED VESSEL OR LESION TYPE: Status: ACTIVE | Noted: 2019-01-01

## 2019-08-30 PROBLEM — G40.909 SEIZURE DISORDER (H): Status: ACTIVE | Noted: 2019-01-01

## 2019-08-30 NOTE — PROGRESS NOTES
"[facility]  PRIMARY CARE PROVIDER AND CLINIC:  Matt Morrissey MD, [address] / [place] [address]  Chief Complaint   Patient presents with     Hospital F/U     [facility] Record Number:  [id number]  Place of Service where encounter took place:  [facility] [[id number]]    Dustin Pearce  is a 89 year old  (1/31/1928), admitted to the above facility from  [facility]. Hospital stay 8/24/2019 through 8/29/2019..  Admitted to this facility for  rehab, medical management and nursing care.    HPI:    HPI information obtained from: facility chart records, facility staff, patient report, [facility] chart review and family/first contact Halina/Significant other report.     Brief Summary of Hospital Course: Mr. Pearce is a 89 y/o complex male with a history of vasculopathy, h/o TIA/CVA, CAD s/p MODESTA, ICA occlusion, DM II and seizures whom has been having \"Spells\" all year starting in April and has been in/out of ER recently due to expressive aphasia, dysarthria, facial drooping, cognitive changes, loss of motor function/spells, all varying at times.  Lately in ER June 30th, July 16th and now again Aug 24th.  Has been followed by Neurology and has under gone extensive testing and they notes are not clear but they seem to note a more concern for seizure etiology, yet treatment has been more for CVA/TIA, but clear etiology of ongoing spells is unclear.  They did note this last admission a P2Y12 test showed inadequate response to Plavix thus they switched him to Brilinta 8/25.  His Keppra dose was adjusted, lowered due to somnolence, and now he has been discharged to the TCU/rehab for ongoing cares, PT/OT and monitoring.      Updates on Status Since Skilled nursing Admission: In meeting Mr. Pearce and his significant other Halina today for follow up.   has a clear degree of cognitive impairment, can give ROS, but HPI and details from Halina.  MrOneil " Ramses does endorse vague intermittent light headedness at times, vague intermittent nausea lately, reports no BM for several days, but no pain.  He admits he does not remember details of these events, but he and Halina do endorse there's been no incontinence with these spells.  He has no other complaints.  Halina endorses the above HPI, reports all the spells so far have been different slightly in presentation.  Of note: Mr. Pearce reports Halina as POA.     CODE STATUS/ADVANCE DIRECTIVES DISCUSSION:   CPR/Full code   Patient's living condition: Lives with significant other     ALLERGIES: Oxycodone; Sulfa drugs; and Tetracycline  PAST MEDICAL HISTORY:  has a past medical history of Abdominal aortic aneurysm (H) (12/25/2016), Acute on chronic systolic congestive heart failure (H) (6/7/2018), Anemia (6/7/2018), Anemia due to blood loss, acute (6/13/2017), Aortic stenosis, Benign essential hypertension (1/6/2017), Benign non-nodular prostatic hyperplasia with lower urinary tract symptoms (10/20/2016), CAD (coronary artery disease), Carotid artery stenosis (10/20/2016), Cerebrovascular accident (H) (10/20/2016), Cognitive impairment (6/7/2018), Coronary artery disease of native artery of native heart with stable angina pectoris (H) (10/20/2016), Depression, unspecified depression type (2/22/2018), Diabetes mellitus (H), Diabetic ulcer of left foot associated with diabetes mellitus due to underlying condition (H) (6/12/2017), 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) (6/12/2017), Gastro-oesophageal reflux disease, Gastroesophageal reflux disease without esophagitis (10/20/2016), Heart attack (H), Hyperlipidemia, Hyperlipidemia, unspecified hyperlipidemia type (10/20/2016), Ischemic cardiomyopathy, Lumbar back pain (12/30/2016), Mild cognitive impairment (10/20/2016), Nephrolithiasis, NSTEMI (non-ST elevated myocardial infarction) (H) (6/7/2018), Osteopenia, PAD (peripheral artery disease) (H), Paroxysmal atrial  fibrillation (H), Peripheral artery disease (H), RBBB with left anterior fascicular block, Slow transit constipation (2/22/2018), Stroke of unknown etiology (H) (12/31/2017), Syncope, TIA (transient ischemic attack) (1/20/2017), Unspecified cerebral artery occlusion with cerebral infarction, UTI (urinary tract infection) due to Enterococcus (2/22/2018), and Ventricular tachycardia (H). He also has no past medical history of Difficult intubation or Malignant hyperthermia.  PAST SURGICAL HISTORY:   has a past surgical history that includes Cholecystectomy; hernia repair; Abdomen surgery; Laser holmium lithotripsy ureter(s), insert stent, combined (3/2/2012); rotator cuff repair rt/lt; Esophagoscopy, gastroscopy, duodenoscopy (EGD), combined (N/A, 12/26/2016); UGI ENDOSCOPY W EUS (N/A, 12/29/2016); Amputate foot (Left, 6/4/2017); Incision and drainage foot, combined (Left, 6/6/2017); and Endarterectomy carotid (Right, 1/3/2018).  FAMILY HISTORY: family history includes Breast Cancer in his mother; Heart Failure (age of onset: 81) in his father.  SOCIAL HISTORY:   reports that he quit smoking about 20 years ago. His smoking use included cigarettes. He started smoking about 50 years ago. He has a 30.00 pack-year smoking history. He has never used smokeless tobacco. He reports that he drank about 4.2 oz of alcohol per week. He reports that he does not use drugs.    Post Discharge Medication Reconciliation Status: discharge medications reconciled and changed, per note/orders (see AVS)    Current Outpatient Medications   Medication Sig Dispense Refill     acetaminophen (TYLENOL) 500 MG tablet Take 500-1,000 mg by mouth every 6 hours as needed for mild pain       AMITRIPTYLINE HCL PO Take 25 mg by mouth nightly as needed for sleep       aspirin EC 81 MG EC tablet Take 1 tablet (81 mg) by mouth daily 30 tablet 0     atorvastatin (LIPITOR) 20 MG tablet Take 2 tablets (40 mg) by mouth daily 30 tablet 0     bisacodyl (DULCOLAX)  10 MG Suppository Place 10 mg rectally daily as needed for constipation       blood glucose monitoring (NO BRAND SPECIFIED) meter device kit Use to test blood sugar bid times daily or as directed. 1 kit 0     blood glucose monitoring (TRUE METRIX BLOOD GLUCOSE TEST) test strip USE TO TEST BLOOD SUGAR THREE TIMES DAILY OR AS DIRECTED 300 strip 1     DULoxetine (CYMBALTA) 30 MG EC capsule TAKE 1 CAPSULE(30 MG) BY MOUTH DAILY 90 capsule 3     insulin aspart (NOVOLOG PEN) 100 UNIT/ML pen Inject 0-12 Units Subcutaneous 3 times daily (with meals) Check glucose then give SQ to self TID (Breakfast, Lunch, Dinner.), if needed.   For glucose < 200, no insulin.  For 201-249 give 2 units.   For 250-299 give 4 units.   For 300-349 give 6 units.   For 350-399 give 8 units.   For 400-449 give 10 units.   For >= 450 give 12 units.       insulin glargine (BASAGLAR KWIKPEN) 100 UNIT/ML pen Inject 26 Units Subcutaneous At Bedtime 15 mL 11     ipratropium (ATROVENT) 0.03 % nasal spray INHALE 1 SPRAY IN EACH NOSTRIL EVERY 12 HOURS AS NEEDED 30 mL 11     levETIRAcetam (KEPPRA) 500 MG tablet Take 1 tablet (500 mg) by mouth 2 times daily       MAGNESIUM-OXIDE 400 (241.3 Mg) MG tablet TAKE 1 TABLET BY MOUTH TWICE DAILY 180 tablet 1     metFORMIN (GLUCOPHAGE) 1000 MG tablet Take 1 tablet (1,000 mg) by mouth 2 times daily (with meals) 180 tablet 3     metoprolol succinate ER (TOPROL-XL) 25 MG 24 hr tablet Take 12.5 mg by mouth At Bedtime       nitroGLYcerin (NITROSTAT) 0.4 MG sublingual tablet For chest pain place 1 tablet under the tongue every 5 minutes for 3 doses. If symptoms persist 5 minutes after 1st dose call 911. 25 tablet 0     omeprazole (PRILOSEC) 40 MG DR capsule TAKE 1 CAPSULE(40 MG) BY MOUTH DAILY 30 TO 60 MINUTES BEFORE A MEAL 90 capsule 3     order for DME Equipment being ordered: True Matrix Blood Glucose meter. 1 Act 11     polyethylene glycol (MIRALAX/GLYCOLAX) Packet Take 17 g by mouth daily       polyethylene  "glycol-propylene glycol (SYSTANE ULTRA) 0.4-0.3 % SOLN ophthalmic solution Place 2 drops into both eyes daily as needed for dry eyes       sennosides (SENOKOT) 8.6 MG tablet Take 2 tablets by mouth daily       tamsulosin (FLOMAX) 0.4 MG capsule Take 0.4 mg by mouth At Bedtime       ticagrelor (BRILINTA) 90 MG tablet Take 1 tablet (90 mg) by mouth 2 times daily       ROS:  Limited secondary to cognitive impairment but today 7 point ROS done including, light headedness/dizziness, fever/chills, pain, Resp, CV, GI, and  and is negative other than noted in HPI.     Vitals:  /59   Pulse 74   Temp 96.2  F (35.7  C)   Resp 18   Ht 1.676 m (5' 6\")   Wt 75.1 kg (165 lb 9.6 oz)   SpO2 93%   BMI 26.73 kg/m       Exam:  GENERAL APPEARANCE:  Elderly male resting in bed, NAD, non-toxic.  ENT:  Mouth and oropharynx normal, moist mucous membranes.   RESP:  Lungs CTA.  Regular relaxed breathing effort.  No cough.   CV: S1/S2 no murmur or rubs.  Regular rhythm and rate.  No generalized edema.   ABDOMEN:   Flat, soft, non-tender with active bowel sounds.  No guarding, rigidity, or rebound tenderness.  EXTREMITIES:  No lower extremity edema, no calf tenderness.   PSYCH: Alert to self and Halina/family, somewhat to situation, not to place, date.  Suspect some degree of cognitive/memory impairment.     Lab/Diagnostic data:  I have personally reviewed labs, which are in facility or EMR chart.     ASSESSMENT/PLAN:  Expressive aphasia  Facial droop  Dysarthria  Spell of altered cognition  H/O TIA (transient ischemic attack) and stroke  Seizure disorder (H)  Occlusion of left internal carotid artery, chronic  History of CEA (carotid endarterectomy), Right  Clear h/o vasculopathy and DM II, with h/o CVA/TIA and seizures.  Has had several spells this year with various s/s of TIA/CVA but in reading ER notes, Neuro has been noting more concern for seizure activity, but we note no incontinence issues with these spells and treatment " has consisted of adjusting Keppra and other medications.  This last admission testing showed inadequate response to Plavix, thus this was stopped and he was started on Brilinta BID on 8/25.  He has since DC'd to TCU here.  It would appear plan is to monitor progress on new Brilinta and f/u with Neuro in future.  Of note: Neuro noted he is not a good candidate for anticoagulation due to fall risk.   -Continues on ASA 81 q daily.   -Continues on newly started Brilinta BID.   -Continues on Statin.   -Continues on Keppra.  --Currently f/u with [facility] 9/27.   --Low threshold to return to ER/hospital for TIA/CVA/Seizure symptoms.     Chronic combined systolic and diastolic congestive heart failure (H)  Coronary artery disease involving native heart without angina pectoris, unspecified vessel or lesion type, h/o MODESTA  Essential hypertension  Review of VS's show VSS and within acceptable range.   No current s/s of clinical CHF.   -No changes, continue to clinically monitor.     Type 2 diabetes mellitus with complication, with long-term current use of insulin (H)  A1c was 8.3 in July.  Reports BID checks at home but QID checks so far do show glucoses into the 300's at times.   -Continue at bedtime Basaglar at 26 units.   -Continues BID Metformin 1000 mg's.   --Started Insulin Aspart TID correction scale.   --Continue QID glucoses for now.     Benign prostatic hyperplasia without lower urinary tract symptoms  Denies any symptoms.  -Continues PTA Flomax.     Cognitive impairment  Debility  -PT/OT to eval and treat.     Constipation, unspecified constipation type  -Changed Miralax to q daily.   -Started Sennosides (SENOKOT) 8.6 MG tablet; Take 2 tablets by mouth daily     Orders written by provider at facility  -As noted above.     Total time spent with patient visit at the skilled nursing facility was 45 min including patient visit, review of past records and meeting with Halina/Family at bedside. Greater than  50% of total time spent with counseling and coordinating care due to admission/establish new care, complex medical case, review of HPI, review/discuss code status/POLST, review hospital plan and medications, development of POC, patient education and review of POC with pt and POA.      Electronically signed by:  ASPEN Silva CNP

## 2019-08-30 NOTE — LETTER
"    8/30/2019        RE: Dustin Pearce  [address]  [place] [address]        [facility]  PRIMARY CARE PROVIDER AND CLINIC:  Matt Morrissey MD, [address] / [place] [address]  Chief Complaint   Patient presents with     Hospital F/U     [facility] Record Number:  [id number]  Place of Service where encounter took place:  [facility]) [[id number]]    Dustin Pearce  is a 89 year old  (1/31/1928), admitted to the above facility from  [facility]. Hospital stay 8/24/2019 through 8/29/2019..  Admitted to this facility for  rehab, medical management and nursing care.    HPI:    HPI information obtained from: facility chart records, facility staff, patient report, [facility] chart review and family/first contact Halina/Significant other report.     Brief Summary of Hospital Course: Mr. Pearce is a 89 y/o complex male with a history of vasculopathy, h/o TIA/CVA, CAD s/p MODESTA, ICA occlusion, DM II and seizures whom has been having \"Spells\" all year starting in April and has been in/out of ER recently due to expressive aphasia, dysarthria, facial drooping, cognitive changes, loss of motor function/spells, all varying at times.  Lately in ER June 30th, July 16th and now again Aug 24th.  Has been followed by Neurology and has under gone extensive testing and they notes are not clear but they seem to note a more concern for seizure etiology, yet treatment has been more for CVA/TIA, but clear etiology of ongoing spells is unclear.  They did note this last admission a P2Y12 test showed inadequate response to Plavix thus they switched him to Brilinta 8/25.  His Keppra dose was adjusted, lowered due to somnolence, and now he has been discharged to the TCU/rehab for ongoing cares, PT/OT and monitoring.      Updates on Status Since Skilled nursing Admission: In meeting Mr. Pearce and his significant other Halina today for follow up.   has " a clear degree of cognitive impairment, can give ROS, but HPI and details from Halina.  Mr. Pearce does endorse vague intermittent light headedness at times, vague intermittent nausea lately, reports no BM for several days, but no pain.  He admits he does not remember details of these events, but he and Halina do endorse there's been no incontinence with these spells.  He has no other complaints.  Halina endorses the above HPI, reports all the spells so far have been different slightly in presentation.  Of note: Mr. Pearce reports Halina as POA.     CODE STATUS/ADVANCE DIRECTIVES DISCUSSION:   CPR/Full code   Patient's living condition: Lives with significant other     ALLERGIES: Oxycodone; Sulfa drugs; and Tetracycline  PAST MEDICAL HISTORY:  has a past medical history of Abdominal aortic aneurysm (H) (12/25/2016), Acute on chronic systolic congestive heart failure (H) (6/7/2018), Anemia (6/7/2018), Anemia due to blood loss, acute (6/13/2017), Aortic stenosis, Benign essential hypertension (1/6/2017), Benign non-nodular prostatic hyperplasia with lower urinary tract symptoms (10/20/2016), CAD (coronary artery disease), Carotid artery stenosis (10/20/2016), Cerebrovascular accident (H) (10/20/2016), Cognitive impairment (6/7/2018), Coronary artery disease of native artery of native heart with stable angina pectoris (H) (10/20/2016), Depression, unspecified depression type (2/22/2018), Diabetes mellitus (H), Diabetic ulcer of left foot associated with diabetes mellitus due to underlying condition (H) (6/12/2017), 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) (6/12/2017), Gastro-oesophageal reflux disease, Gastroesophageal reflux disease without esophagitis (10/20/2016), Heart attack (H), Hyperlipidemia, Hyperlipidemia, unspecified hyperlipidemia type (10/20/2016), Ischemic cardiomyopathy, Lumbar back pain (12/30/2016), Mild cognitive impairment (10/20/2016), Nephrolithiasis, NSTEMI (non-ST elevated myocardial  infarction) (H) (6/7/2018), Osteopenia, PAD (peripheral artery disease) (H), Paroxysmal atrial fibrillation (H), Peripheral artery disease (H), RBBB with left anterior fascicular block, Slow transit constipation (2/22/2018), Stroke of unknown etiology (H) (12/31/2017), Syncope, TIA (transient ischemic attack) (1/20/2017), Unspecified cerebral artery occlusion with cerebral infarction, UTI (urinary tract infection) due to Enterococcus (2/22/2018), and Ventricular tachycardia (H). He also has no past medical history of Difficult intubation or Malignant hyperthermia.  PAST SURGICAL HISTORY:   has a past surgical history that includes Cholecystectomy; hernia repair; Abdomen surgery; Laser holmium lithotripsy ureter(s), insert stent, combined (3/2/2012); rotator cuff repair rt/lt; Esophagoscopy, gastroscopy, duodenoscopy (EGD), combined (N/A, 12/26/2016); UGI ENDOSCOPY W EUS (N/A, 12/29/2016); Amputate foot (Left, 6/4/2017); Incision and drainage foot, combined (Left, 6/6/2017); and Endarterectomy carotid (Right, 1/3/2018).  FAMILY HISTORY: family history includes Breast Cancer in his mother; Heart Failure (age of onset: 81) in his father.  SOCIAL HISTORY:   reports that he quit smoking about 20 years ago. His smoking use included cigarettes. He started smoking about 50 years ago. He has a 30.00 pack-year smoking history. He has never used smokeless tobacco. He reports that he drank about 4.2 oz of alcohol per week. He reports that he does not use drugs.    Post Discharge Medication Reconciliation Status: discharge medications reconciled and changed, per note/orders (see AVS)    Current Outpatient Medications   Medication Sig Dispense Refill     acetaminophen (TYLENOL) 500 MG tablet Take 500-1,000 mg by mouth every 6 hours as needed for mild pain       AMITRIPTYLINE HCL PO Take 25 mg by mouth nightly as needed for sleep       aspirin EC 81 MG EC tablet Take 1 tablet (81 mg) by mouth daily 30 tablet 0     atorvastatin  (LIPITOR) 20 MG tablet Take 2 tablets (40 mg) by mouth daily 30 tablet 0     bisacodyl (DULCOLAX) 10 MG Suppository Place 10 mg rectally daily as needed for constipation       blood glucose monitoring (NO BRAND SPECIFIED) meter device kit Use to test blood sugar bid times daily or as directed. 1 kit 0     blood glucose monitoring (TRUE METRIX BLOOD GLUCOSE TEST) test strip USE TO TEST BLOOD SUGAR THREE TIMES DAILY OR AS DIRECTED 300 strip 1     DULoxetine (CYMBALTA) 30 MG EC capsule TAKE 1 CAPSULE(30 MG) BY MOUTH DAILY 90 capsule 3     insulin aspart (NOVOLOG PEN) 100 UNIT/ML pen Inject 0-12 Units Subcutaneous 3 times daily (with meals) Check glucose then give SQ to self TID (Breakfast, Lunch, Dinner.), if needed.   For glucose < 200, no insulin.  For 201-249 give 2 units.   For 250-299 give 4 units.   For 300-349 give 6 units.   For 350-399 give 8 units.   For 400-449 give 10 units.   For >= 450 give 12 units.       insulin glargine (BASAGLAR KWIKPEN) 100 UNIT/ML pen Inject 26 Units Subcutaneous At Bedtime 15 mL 11     ipratropium (ATROVENT) 0.03 % nasal spray INHALE 1 SPRAY IN EACH NOSTRIL EVERY 12 HOURS AS NEEDED 30 mL 11     levETIRAcetam (KEPPRA) 500 MG tablet Take 1 tablet (500 mg) by mouth 2 times daily       MAGNESIUM-OXIDE 400 (241.3 Mg) MG tablet TAKE 1 TABLET BY MOUTH TWICE DAILY 180 tablet 1     metFORMIN (GLUCOPHAGE) 1000 MG tablet Take 1 tablet (1,000 mg) by mouth 2 times daily (with meals) 180 tablet 3     metoprolol succinate ER (TOPROL-XL) 25 MG 24 hr tablet Take 12.5 mg by mouth At Bedtime       nitroGLYcerin (NITROSTAT) 0.4 MG sublingual tablet For chest pain place 1 tablet under the tongue every 5 minutes for 3 doses. If symptoms persist 5 minutes after 1st dose call 911. 25 tablet 0     omeprazole (PRILOSEC) 40 MG DR capsule TAKE 1 CAPSULE(40 MG) BY MOUTH DAILY 30 TO 60 MINUTES BEFORE A MEAL 90 capsule 3     order for DME Equipment being ordered: True Matrix Blood Glucose meter. 1 Act 11      "polyethylene glycol (MIRALAX/GLYCOLAX) Packet Take 17 g by mouth daily       polyethylene glycol-propylene glycol (SYSTANE ULTRA) 0.4-0.3 % SOLN ophthalmic solution Place 2 drops into both eyes daily as needed for dry eyes       sennosides (SENOKOT) 8.6 MG tablet Take 2 tablets by mouth daily       tamsulosin (FLOMAX) 0.4 MG capsule Take 0.4 mg by mouth At Bedtime       ticagrelor (BRILINTA) 90 MG tablet Take 1 tablet (90 mg) by mouth 2 times daily       ROS:  Limited secondary to cognitive impairment but today 7 point ROS done including, light headedness/dizziness, fever/chills, pain, Resp, CV, GI, and  and is negative other than noted in HPI.     Vitals:  /59   Pulse 74   Temp 96.2  F (35.7  C)   Resp 18   Ht 1.676 m (5' 6\")   Wt 75.1 kg (165 lb 9.6 oz)   SpO2 93%   BMI 26.73 kg/m        Exam:  GENERAL APPEARANCE:  Elderly male resting in bed, NAD, non-toxic.  ENT:  Mouth and oropharynx normal, moist mucous membranes.   RESP:  Lungs CTA.  Regular relaxed breathing effort.  No cough.   CV: S1/S2 no murmur or rubs.  Regular rhythm and rate.  No generalized edema.   ABDOMEN:   Flat, soft, non-tender with active bowel sounds.  No guarding, rigidity, or rebound tenderness.  EXTREMITIES:  No lower extremity edema, no calf tenderness.   PSYCH: Alert to self and Halina/family, somewhat to situation, not to place, date.  Suspect some degree of cognitive/memory impairment.     Lab/Diagnostic data:  I have personally reviewed labs, which are in facility or EMR chart.     ASSESSMENT/PLAN:  Expressive aphasia  Facial droop  Dysarthria  Spell of altered cognition  H/O TIA (transient ischemic attack) and stroke  Seizure disorder (H)  Occlusion of left internal carotid artery, chronic  History of CEA (carotid endarterectomy), Right  Clear h/o vasculopathy and DM II, with h/o CVA/TIA and seizures.  Has had several spells this year with various s/s of TIA/CVA but in reading ER notes, Neuro has been noting more concern " for seizure activity, but we note no incontinence issues with these spells and treatment has consisted of adjusting Keppra and other medications.  This last admission testing showed inadequate response to Plavix, thus this was stopped and he was started on Brilinta BID on 8/25.  He has since DC'd to TCU here.  It would appear plan is to monitor progress on new Brilinta and f/u with Neuro in future.  Of note: Neuro noted he is not a good candidate for anticoagulation due to fall risk.   -Continues on ASA 81 q daily.   -Continues on newly started Brilinta BID.   -Continues on Statin.   -Continues on Keppra.  --Currently f/u with [place] [facility] 9/27.   --Low threshold to return to ER/hospital for TIA/CVA/Seizure symptoms.     Chronic combined systolic and diastolic congestive heart failure (H)  Coronary artery disease involving native heart without angina pectoris, unspecified vessel or lesion type, h/o MODESTA  Essential hypertension  Review of VS's show VSS and within acceptable range.   No current s/s of clinical CHF.   -No changes, continue to clinically monitor.     Type 2 diabetes mellitus with complication, with long-term current use of insulin (H)  A1c was 8.3 in July.  Reports BID checks at home but QID checks so far do show glucoses into the 300's at times.   -Continue at bedtime Basaglar at 26 units.   -Continues BID Metformin 1000 mg's.   --Started Insulin Aspart TID correction scale.   --Continue QID glucoses for now.     Benign prostatic hyperplasia without lower urinary tract symptoms  Denies any symptoms.  -Continues PTA Flomax.     Cognitive impairment  Debility  -PT/OT to eval and treat.     Constipation, unspecified constipation type  -Changed Miralax to q daily.   -Started Sennosides (SENOKOT) 8.6 MG tablet; Take 2 tablets by mouth daily     Orders written by provider at facility  -As noted above.     Total time spent with patient visit at the skilled nursing facility was 45 min including  patient visit, review of past records and meeting with Halina/Family at bedside. Greater than 50% of total time spent with counseling and coordinating care due to admission/establish new care, complex medical case, review of HPI, review/discuss code status/POLST, review hospital plan and medications, development of POC, patient education and review of POC with pt and POA.      Electronically signed by:  ASPEN Silva CNP               Sincerely,        ASEPN Silva CNP

## 2019-09-04 NOTE — LETTER
9/4/2019        RE: Dustin Pearce  [address]  [place] [address]        [facility]  INITIAL VISIT NOTE  September 4, 2019    PRIMARY CARE PROVIDER AND CLINIC:  Matt Morrissey [address] / [place] [address]    Chief Complaint   Patient presents with     Hospital F/U       HPI:    Dustin Pearce is a 89 year old  (1/31/1928) male who was seen at [facility] on September 4, 2019 for an initial visit. Medical history is notable for coronary artery disease, hypertension, dyslipidemia, seizure disorder, diabetes mellitus type 2, peripheral arterial disease, paroxysmal atrial fibrillation, BPH, GERD, depression, TIA, and mild aortic stenosis.  He was brought to the emergency department at [facility] for evaluation of slurred speech.  He was hospitalized from August 24 through August 29, 2019.  He had 2 recent presentations for expressive aphasia and dysarthria on July 1 on July 16, 2019 with extensive work-up including unremarkable CT head, MRI brain, EEG, and TTE with bubble study.  Work-up in the ER including CTA and CT head with perfusion was unremarkable.  P2 Y 12 level was suggestive of inadequate response to Plavix therefore it was switched to Brilinta on August 25.  He had recurrent symptoms in the hospital with RRT is on August 25 and August 26.  Continuous EEG monitoring from August 26 through discharge remain negative for seizure activity but there was also no recurrent events during this period.  Neurology service suspected seizure as likely etiology of his symptoms.  Dose of Keppra was decreased to 500 mg p.o. twice daily on August 27 due to fatigue and somnolence.    Patient is admitted to this facility for medical management, nursing care, and rehab.     Patient was seen in his room.  He is hard of hearing.  There is no report of recurrence of abnormal speech.  He reports no fever, chills, headache, chest pain, dyspnea, palpitation,  nausea, vomiting, abdominal pain or other complaints.    CODE STATUS:   CPR/Full code     PAST MEDICAL HISTORY:   Three-vessel coronary artery disease, including  of RCA, severe proximal LAD, and severe proximal left circumflex disease, status post PCI to proximal LAD and proximal left circumflex in May 2018  TIA  Carotid artery stenosis status post right endarterectomy in January 2018  Chronic systolic heart failure, last LV ejection fraction was 40 to 45% on July 29, 2019  Paroxysmal atrial fibrillation  Seizure disorder   Hypertension   Dyslipidemia  Diabetes mellitus type 2   Chronic anemia, baseline hemoglobin in the range of 10-12  BPH  GERD  Depression  History of left foot ulcer with osteomyelitis status post left partial fifth ray amputation in June 2017  Peripheral arterial disease status post left common iliac artery stent and left SFA angioplasty in June 2017  Mild aortic stenosis  Abdominal aortic aneurysm  Nephrolithiasis  Impaired hearing    PAST SURGICAL HISTORY:   Past Surgical History:   Procedure Laterality Date     AMPUTATE FOOT Left 6/4/2017    Procedure: AMPUTATE FOOT;  Sun Add#3: partial left foot amputation - Sagital Saw - Mini C-Arm;  Surgeon: Moe Kirk DPM;  Location:  OR     CHOLECYSTECTOMY       ENDARTERECTOMY CAROTID Right 1/3/2018    Procedure: ENDARTERECTOMY CAROTID;  RIGHT CAROTID ENDARTERECTOMY WITH EEG;  Surgeon: Roland Rodriguez MD;  Location:  OR     ESOPHAGOSCOPY, GASTROSCOPY, DUODENOSCOPY (EGD), COMBINED N/A 12/26/2016    Procedure: COMBINED ESOPHAGOSCOPY, GASTROSCOPY, DUODENOSCOPY (EGD);  Surgeon: Nasim Louis MD;  Location:  GI     GENITOURINARY SURGERY      KIDNEY STONE REMOVAL X 4     HC UGI ENDOSCOPY W EUS N/A 12/29/2016    Procedure: COMBINED ENDOSCOPIC ULTRASOUND, ESOPHAGOSCOPY, GASTROSCOPY, DUODENOSCOPY (EGD);  Surgeon: Nasim Louis MD;  Location:  GI     HERNIA REPAIR       INCISION AND DRAINAGE FOOT, COMBINED Left  6/6/2017    Procedure: COMBINED INCISION AND DRAINAGE FOOT;  REVISIONAL LEFT FOOT INCISION AND DRAINAGE WITH POSSIBLE FLAP CLOSURE , ANTIBIOTIC SOLUTION ;  Surgeon: Nabil Ann DPM;  Location: [facility] OR     LASER HOLMIUM LITHOTRIPSY URETER(S), INSERT STENT, COMBINED  3/2/2012    Procedure:COMBINED CYSTOSCOPY, URETEROSCOPY, LASER HOLMIUM LITHOTRIPSY URETER(S), INSERT STENT; CYSTOSCOPY, RIGHT RETROGRADES, RIGHT URETEROSCOPY, HOLMIUM LASER, RIGHT STENT PLACEMENT; Surgeon:ANJELICA LEÓN; Location:[facility] OR     ROTATOR CUFF REPAIR RT/LT         FAMILY HISTORY:   Family History   Problem Relation Age of Onset     Breast Cancer Mother      Heart Failure Father 81       SOCIAL HISTORY:   Patient is a former smoker and quit in 1999.  Prior to that he had a smoked 1 pack a day for 30 years.  He drinks 7 a standard drinks or equivalent per week.  He lives with his girlfriend.    MEDICATIONS:  Current Outpatient Medications   Medication Sig Dispense Refill     acetaminophen (TYLENOL) 500 MG tablet Take 500-1,000 mg by mouth every 6 hours as needed for mild pain       AMITRIPTYLINE HCL PO Take 25 mg by mouth nightly as needed for sleep       aspirin EC 81 MG EC tablet Take 1 tablet (81 mg) by mouth daily 30 tablet 0     atorvastatin (LIPITOR) 20 MG tablet Take 2 tablets (40 mg) by mouth daily 30 tablet 0     bisacodyl (DULCOLAX) 10 MG Suppository Place 10 mg rectally daily as needed for constipation       blood glucose monitoring (NO BRAND SPECIFIED) meter device kit Use to test blood sugar bid times daily or as directed. 1 kit 0     blood glucose monitoring (TRUE METRIX BLOOD GLUCOSE TEST) test strip USE TO TEST BLOOD SUGAR THREE TIMES DAILY OR AS DIRECTED 300 strip 1     DULoxetine (CYMBALTA) 30 MG EC capsule TAKE 1 CAPSULE(30 MG) BY MOUTH DAILY 90 capsule 3     insulin aspart (NOVOLOG PEN) 100 UNIT/ML pen Inject 0-12 Units Subcutaneous 3 times daily (with meals) Check glucose then give SQ to self TID (Breakfast,  "Lunch, Dinner.), if needed.   For glucose < 200, no insulin.  For 201-249 give 2 units.   For 250-299 give 4 units.   For 300-349 give 6 units.   For 350-399 give 8 units.   For 400-449 give 10 units.   For >= 450 give 12 units.       insulin glargine (BASAGLAR KWIKPEN) 100 UNIT/ML pen Inject 26 Units Subcutaneous At Bedtime 15 mL 11     ipratropium (ATROVENT) 0.03 % nasal spray INHALE 1 SPRAY IN EACH NOSTRIL EVERY 12 HOURS AS NEEDED 30 mL 11     levETIRAcetam (KEPPRA) 500 MG tablet Take 1 tablet (500 mg) by mouth 2 times daily       MAGNESIUM-OXIDE 400 (241.3 Mg) MG tablet TAKE 1 TABLET BY MOUTH TWICE DAILY 180 tablet 1     metFORMIN (GLUCOPHAGE) 1000 MG tablet Take 1 tablet (1,000 mg) by mouth 2 times daily (with meals) 180 tablet 3     metoprolol succinate ER (TOPROL-XL) 25 MG 24 hr tablet Take 12.5 mg by mouth At Bedtime       nitroGLYcerin (NITROSTAT) 0.4 MG sublingual tablet For chest pain place 1 tablet under the tongue every 5 minutes for 3 doses. If symptoms persist 5 minutes after 1st dose call 911. 25 tablet 0     omeprazole (PRILOSEC) 40 MG DR capsule TAKE 1 CAPSULE(40 MG) BY MOUTH DAILY 30 TO 60 MINUTES BEFORE A MEAL 90 capsule 3     order for DME Equipment being ordered: True Matrix Blood Glucose meter. 1 Act 11     polyethylene glycol (MIRALAX/GLYCOLAX) Packet Take 17 g by mouth daily       polyethylene glycol-propylene glycol (SYSTANE ULTRA) 0.4-0.3 % SOLN ophthalmic solution Place 2 drops into both eyes daily as needed for dry eyes       sennosides (SENOKOT) 8.6 MG tablet Take 2 tablets by mouth daily       tamsulosin (FLOMAX) 0.4 MG capsule Take 0.4 mg by mouth At Bedtime       ticagrelor (BRILINTA) 90 MG tablet Take 1 tablet (90 mg) by mouth 2 times daily         ALLERGIES:  Allergies   Allergen Reactions     Oxycodone Other (See Comments)     \"TERRIBLE SWEATING\"     Sulfa Drugs      Tetracycline        ROS:  10 point ROS neg other than the symptoms noted above in the HPI.    PHYSICAL " EXAM:  Vitals: Blood pressure 115/65, heart rate 69, respiratory rate 16, temperature 96.6  F, oxygen saturation 93%.  Gen: Cooperative and in no acute distress  HEENT: Normocephalic; oropharynx clear  Card: Normal S1, S2, RRR, no murmurs  Resp: Lungs clear to auscultation bilaterally  GI: Abdomen soft, not-tender, non-distended, +BS  Ext: No LE edema  Neuro: CX II-XII grossly in tact; ROM in all four extremities grossly in tact  Psych: Alert and oriented almost x3; normal affect  Skin: No acute rash    LABORATORY/IMAGING DATA:  Lab Results   Component Value Date    WBC 5.8 08/28/2019     Lab Results   Component Value Date    RBC 3.86 08/28/2019     Lab Results   Component Value Date    HGB 11.0 08/28/2019     Lab Results   Component Value Date    HCT 34.4 08/28/2019     Lab Results   Component Value Date    MCV 89 08/28/2019     Lab Results   Component Value Date    MCH 28.5 08/28/2019     Lab Results   Component Value Date    MCHC 32.0 08/28/2019     Lab Results   Component Value Date    RDW 14.1 08/28/2019     Lab Results   Component Value Date     08/28/2019     Last Comprehensive Metabolic Panel:  Sodium   Date Value Ref Range Status   08/28/2019 141 133 - 144 mmol/L Final     Potassium   Date Value Ref Range Status   08/29/2019 4.2 3.4 - 5.3 mmol/L Final     Chloride   Date Value Ref Range Status   08/28/2019 108 94 - 109 mmol/L Final     Carbon Dioxide   Date Value Ref Range Status   08/28/2019 28 20 - 32 mmol/L Final     Anion Gap   Date Value Ref Range Status   08/28/2019 5 3 - 14 mmol/L Final     Glucose   Date Value Ref Range Status   08/28/2019 174 (H) 70 - 99 mg/dL Final     Urea Nitrogen   Date Value Ref Range Status   08/28/2019 19 7 - 30 mg/dL Final     Creatinine   Date Value Ref Range Status   08/28/2019 0.72 0.66 - 1.25 mg/dL Final     GFR Estimate   Date Value Ref Range Status   08/28/2019 81 >60 mL/min/[1.73_m2] Final     Comment:     Non  GFR Calc  Starting 12/18/2018,  serum creatinine based estimated GFR (eGFR) will be   calculated using the Chronic Kidney Disease Epidemiology Collaboration   (CKD-EPI) equation.       Calcium   Date Value Ref Range Status   08/28/2019 9.2 8.5 - 10.1 mg/dL Final       ASSESSMENT/PLAN:  Slurred and change in speech spells, concerning for recurrent seizure,  Seizure disorder,   History of TIA,  Peripheral arterial disease,  Physical deconditioning.  Work-up in the hospital was unremarkable including continuous EEG monitoring, however due to recurrence of abnormal speech in the past 2-month, neurology is highly suspicious of recurrent seizure.  No recurrence in TCU thus far.  Plan:  Continue prior to admission Keppra 500 mg p.o. twice daily (Keppra dose was adjusted in the hospital due to somnolence)  Continue prior to admission aspirin and Brilinta  Continue prior to admission atorvastatin 40 g daily  Follow-up with [place] [facility] on September 27    Diabetes mellitus type 2.  Last hemoglobin A1c level was 8.3% on July 1, 2019.  Blood glucose is within the desirable range.  Plan:  Continue prior to admission Basaglar 26 units subcu at bedtime   Continue prior to admission metformin 1000 g p.o. twice daily  Continue NovoLog sliding scale  Continue to monitor blood glucose    Coronary artery disease, status post PCI to proximal LAD and proximal left circumflex in May 2018,  Hypertension,  Dyslipidemia,  Chronic systolic heart failure/ischemic cardiomyopathy, LV ejection fraction 40 to 45%.  Stable.  Plan:  Continue prior to admission aspirin, Brilinta, atorvastatin, metoprolol    BPH.  Plan:  Continue prior to admission Flomax    GERD.  Chronic.  Plan:  Continue prior to admission omeprazole    Depression.  Chronic.  Plan:  Continue prior to admission Cymbalta    Insomnia.  Plan:  Continue amitriptyline as needed at night    Chronic anemia.  Baseline hemoglobin range of 10-12 and appears stable.  Plan:  Continue to monitor hemoglobin  periodically.    Electronically signed by:  Fe Kim MD                      Sincerely,        Fe Kim MD

## 2019-09-04 NOTE — PROGRESS NOTES
[facility]  INITIAL VISIT NOTE  September 4, 2019    PRIMARY CARE PROVIDER AND CLINIC:  Matt Morrissey MELVIN [address] [facility] [address] / [place] [address]    Chief Complaint   Patient presents with     Hospital F/U       HPI:    Dustin Pearce is a 89 year old  (1/31/1928) male who was seen at [facility] on September 4, 2019 for an initial visit. Medical history is notable for coronary artery disease, hypertension, dyslipidemia, seizure disorder, diabetes mellitus type 2, peripheral arterial disease, paroxysmal atrial fibrillation, BPH, GERD, depression, TIA, and mild aortic stenosis.  He was brought to the emergency department at [facility] for evaluation of slurred speech.  He was hospitalized from August 24 through August 29, 2019.  He had 2 recent presentations for expressive aphasia and dysarthria on July 1 on July 16, 2019 with extensive work-up including unremarkable CT head, MRI brain, EEG, and TTE with bubble study.  Work-up in the ER including CTA and CT head with perfusion was unremarkable.  P2 Y 12 level was suggestive of inadequate response to Plavix therefore it was switched to Brilinta on August 25.  He had recurrent symptoms in the hospital with RRT is on August 25 and August 26.  Continuous EEG monitoring from August 26 through discharge remain negative for seizure activity but there was also no recurrent events during this period.  Neurology service suspected seizure as likely etiology of his symptoms.  Dose of Keppra was decreased to 500 mg p.o. twice daily on August 27 due to fatigue and somnolence.    Patient is admitted to this facility for medical management, nursing care, and rehab.     Patient was seen in his room.  He is hard of hearing.  There is no report of recurrence of abnormal speech.  He reports no fever, chills, headache, chest pain, dyspnea, palpitation, nausea, vomiting, abdominal pain or other complaints.    CODE STATUS:   CPR/Full code      PAST MEDICAL HISTORY:   Three-vessel coronary artery disease, including  of RCA, severe proximal LAD, and severe proximal left circumflex disease, status post PCI to proximal LAD and proximal left circumflex in May 2018  TIA  Carotid artery stenosis status post right endarterectomy in January 2018  Chronic systolic heart failure, last LV ejection fraction was 40 to 45% on July 29, 2019  Paroxysmal atrial fibrillation  Seizure disorder   Hypertension   Dyslipidemia  Diabetes mellitus type 2   Chronic anemia, baseline hemoglobin in the range of 10-12  BPH  GERD  Depression  History of left foot ulcer with osteomyelitis status post left partial fifth ray amputation in June 2017  Peripheral arterial disease status post left common iliac artery stent and left SFA angioplasty in June 2017  Mild aortic stenosis  Abdominal aortic aneurysm  Nephrolithiasis  Impaired hearing    PAST SURGICAL HISTORY:   Past Surgical History:   Procedure Laterality Date     AMPUTATE FOOT Left 6/4/2017    Procedure: AMPUTATE FOOT;  Sun Add#3: partial left foot amputation - Sagital Saw - Mini C-Arm;  Surgeon: Moe Kirk DPM;  Location:  OR     CHOLECYSTECTOMY       ENDARTERECTOMY CAROTID Right 1/3/2018    Procedure: ENDARTERECTOMY CAROTID;  RIGHT CAROTID ENDARTERECTOMY WITH EEG;  Surgeon: Roland Rodriguez MD;  Location:  OR     ESOPHAGOSCOPY, GASTROSCOPY, DUODENOSCOPY (EGD), COMBINED N/A 12/26/2016    Procedure: COMBINED ESOPHAGOSCOPY, GASTROSCOPY, DUODENOSCOPY (EGD);  Surgeon: Nasim Louis MD;  Location:  GI     GENITOURINARY SURGERY      KIDNEY STONE REMOVAL X 4     HC UGI ENDOSCOPY W EUS N/A 12/29/2016    Procedure: COMBINED ENDOSCOPIC ULTRASOUND, ESOPHAGOSCOPY, GASTROSCOPY, DUODENOSCOPY (EGD);  Surgeon: Nasim Louis MD;  Location:  GI     HERNIA REPAIR       INCISION AND DRAINAGE FOOT, COMBINED Left 6/6/2017    Procedure: COMBINED INCISION AND DRAINAGE FOOT;  REVISIONAL LEFT FOOT INCISION  AND DRAINAGE WITH POSSIBLE FLAP CLOSURE , ANTIBIOTIC SOLUTION ;  Surgeon: Nabil Ann DPM;  Location: [facility] OR     LASER HOLMIUM LITHOTRIPSY URETER(S), INSERT STENT, COMBINED  3/2/2012    Procedure:COMBINED CYSTOSCOPY, URETEROSCOPY, LASER HOLMIUM LITHOTRIPSY URETER(S), INSERT STENT; CYSTOSCOPY, RIGHT RETROGRADES, RIGHT URETEROSCOPY, HOLMIUM LASER, RIGHT STENT PLACEMENT; Surgeon:ANJELICA LEÓN; Location:[facility] OR     ROTATOR CUFF REPAIR RT/LT         FAMILY HISTORY:   Family History   Problem Relation Age of Onset     Breast Cancer Mother      Heart Failure Father 81       SOCIAL HISTORY:   Patient is a former smoker and quit in 1999.  Prior to that he had a smoked 1 pack a day for 30 years.  He drinks 7 a standard drinks or equivalent per week.  He lives with his girlfriend.    MEDICATIONS:  Current Outpatient Medications   Medication Sig Dispense Refill     acetaminophen (TYLENOL) 500 MG tablet Take 500-1,000 mg by mouth every 6 hours as needed for mild pain       AMITRIPTYLINE HCL PO Take 25 mg by mouth nightly as needed for sleep       aspirin EC 81 MG EC tablet Take 1 tablet (81 mg) by mouth daily 30 tablet 0     atorvastatin (LIPITOR) 20 MG tablet Take 2 tablets (40 mg) by mouth daily 30 tablet 0     bisacodyl (DULCOLAX) 10 MG Suppository Place 10 mg rectally daily as needed for constipation       blood glucose monitoring (NO BRAND SPECIFIED) meter device kit Use to test blood sugar bid times daily or as directed. 1 kit 0     blood glucose monitoring (TRUE METRIX BLOOD GLUCOSE TEST) test strip USE TO TEST BLOOD SUGAR THREE TIMES DAILY OR AS DIRECTED 300 strip 1     DULoxetine (CYMBALTA) 30 MG EC capsule TAKE 1 CAPSULE(30 MG) BY MOUTH DAILY 90 capsule 3     insulin aspart (NOVOLOG PEN) 100 UNIT/ML pen Inject 0-12 Units Subcutaneous 3 times daily (with meals) Check glucose then give SQ to self TID (Breakfast, Lunch, Dinner.), if needed.   For glucose < 200, no insulin.  For 201-249 give 2 units.   For  "250-299 give 4 units.   For 300-349 give 6 units.   For 350-399 give 8 units.   For 400-449 give 10 units.   For >= 450 give 12 units.       insulin glargine (BASAGLAR KWIKPEN) 100 UNIT/ML pen Inject 26 Units Subcutaneous At Bedtime 15 mL 11     ipratropium (ATROVENT) 0.03 % nasal spray INHALE 1 SPRAY IN EACH NOSTRIL EVERY 12 HOURS AS NEEDED 30 mL 11     levETIRAcetam (KEPPRA) 500 MG tablet Take 1 tablet (500 mg) by mouth 2 times daily       MAGNESIUM-OXIDE 400 (241.3 Mg) MG tablet TAKE 1 TABLET BY MOUTH TWICE DAILY 180 tablet 1     metFORMIN (GLUCOPHAGE) 1000 MG tablet Take 1 tablet (1,000 mg) by mouth 2 times daily (with meals) 180 tablet 3     metoprolol succinate ER (TOPROL-XL) 25 MG 24 hr tablet Take 12.5 mg by mouth At Bedtime       nitroGLYcerin (NITROSTAT) 0.4 MG sublingual tablet For chest pain place 1 tablet under the tongue every 5 minutes for 3 doses. If symptoms persist 5 minutes after 1st dose call 911. 25 tablet 0     omeprazole (PRILOSEC) 40 MG DR capsule TAKE 1 CAPSULE(40 MG) BY MOUTH DAILY 30 TO 60 MINUTES BEFORE A MEAL 90 capsule 3     order for DME Equipment being ordered: True Matrix Blood Glucose meter. 1 Act 11     polyethylene glycol (MIRALAX/GLYCOLAX) Packet Take 17 g by mouth daily       polyethylene glycol-propylene glycol (SYSTANE ULTRA) 0.4-0.3 % SOLN ophthalmic solution Place 2 drops into both eyes daily as needed for dry eyes       sennosides (SENOKOT) 8.6 MG tablet Take 2 tablets by mouth daily       tamsulosin (FLOMAX) 0.4 MG capsule Take 0.4 mg by mouth At Bedtime       ticagrelor (BRILINTA) 90 MG tablet Take 1 tablet (90 mg) by mouth 2 times daily         ALLERGIES:  Allergies   Allergen Reactions     Oxycodone Other (See Comments)     \"TERRIBLE SWEATING\"     Sulfa Drugs      Tetracycline        ROS:  10 point ROS neg other than the symptoms noted above in the HPI.    PHYSICAL EXAM:  Vitals: Blood pressure 115/65, heart rate 69, respiratory rate 16, temperature 96.6  F, oxygen " saturation 93%.  Gen: Cooperative and in no acute distress  HEENT: Normocephalic; oropharynx clear  Card: Normal S1, S2, RRR, no murmurs  Resp: Lungs clear to auscultation bilaterally  GI: Abdomen soft, not-tender, non-distended, +BS  Ext: No LE edema  Neuro: CX II-XII grossly in tact; ROM in all four extremities grossly in tact  Psych: Alert and oriented almost x3; normal affect  Skin: No acute rash    LABORATORY/IMAGING DATA:  Lab Results   Component Value Date    WBC 5.8 08/28/2019     Lab Results   Component Value Date    RBC 3.86 08/28/2019     Lab Results   Component Value Date    HGB 11.0 08/28/2019     Lab Results   Component Value Date    HCT 34.4 08/28/2019     Lab Results   Component Value Date    MCV 89 08/28/2019     Lab Results   Component Value Date    MCH 28.5 08/28/2019     Lab Results   Component Value Date    MCHC 32.0 08/28/2019     Lab Results   Component Value Date    RDW 14.1 08/28/2019     Lab Results   Component Value Date     08/28/2019     Last Comprehensive Metabolic Panel:  Sodium   Date Value Ref Range Status   08/28/2019 141 133 - 144 mmol/L Final     Potassium   Date Value Ref Range Status   08/29/2019 4.2 3.4 - 5.3 mmol/L Final     Chloride   Date Value Ref Range Status   08/28/2019 108 94 - 109 mmol/L Final     Carbon Dioxide   Date Value Ref Range Status   08/28/2019 28 20 - 32 mmol/L Final     Anion Gap   Date Value Ref Range Status   08/28/2019 5 3 - 14 mmol/L Final     Glucose   Date Value Ref Range Status   08/28/2019 174 (H) 70 - 99 mg/dL Final     Urea Nitrogen   Date Value Ref Range Status   08/28/2019 19 7 - 30 mg/dL Final     Creatinine   Date Value Ref Range Status   08/28/2019 0.72 0.66 - 1.25 mg/dL Final     GFR Estimate   Date Value Ref Range Status   08/28/2019 81 >60 mL/min/[1.73_m2] Final     Comment:     Non  GFR Calc  Starting 12/18/2018, serum creatinine based estimated GFR (eGFR) will be   calculated using the Chronic Kidney Disease  Epidemiology Collaboration   (CKD-EPI) equation.       Calcium   Date Value Ref Range Status   08/28/2019 9.2 8.5 - 10.1 mg/dL Final       ASSESSMENT/PLAN:  Slurred and change in speech spells, concerning for recurrent seizure,  Seizure disorder,   History of TIA,  Peripheral arterial disease,  Physical deconditioning.  Work-up in the hospital was unremarkable including continuous EEG monitoring, however due to recurrence of abnormal speech in the past 2-month, neurology is highly suspicious of recurrent seizure.  No recurrence in TCU thus far.  Plan:  Continue prior to admission Keppra 500 mg p.o. twice daily (Keppra dose was adjusted in the hospital due to somnolence)  Continue prior to admission aspirin and Brilinta  Continue prior to admission atorvastatin 40 g daily  Follow-up with [facility] on September 27    Diabetes mellitus type 2.  Last hemoglobin A1c level was 8.3% on July 1, 2019.  Blood glucose is within the desirable range.  Plan:  Continue prior to admission Basaglar 26 units subcu at bedtime   Continue prior to admission metformin 1000 g p.o. twice daily  Continue NovoLog sliding scale  Continue to monitor blood glucose    Coronary artery disease, status post PCI to proximal LAD and proximal left circumflex in May 2018,  Hypertension,  Dyslipidemia,  Chronic systolic heart failure/ischemic cardiomyopathy, LV ejection fraction 40 to 45%.  Stable.  Plan:  Continue prior to admission aspirin, Brilinta, atorvastatin, metoprolol    BPH.  Plan:  Continue prior to admission Flomax    GERD.  Chronic.  Plan:  Continue prior to admission omeprazole    Depression.  Chronic.  Plan:  Continue prior to admission Cymbalta    Insomnia.  Plan:  Continue amitriptyline as needed at night    Chronic anemia.  Baseline hemoglobin range of 10-12 and appears stable.  Plan:  Continue to monitor hemoglobin periodically.    Electronically signed by:  Fe Kim MD

## 2019-09-06 PROBLEM — R47.89 SPELL OF CHANGE IN SPEECH: Status: ACTIVE | Noted: 2019-01-01

## 2019-09-06 NOTE — PROGRESS NOTES
"[facility]  [facility] Record Number:  [id number]  Place of Service where encounter took place:  [facility] [[id number]]  Chief Complaint   Patient presents with     RECHECK       HPI:    Dustin Pearce  is a 89 year old (1/31/1928), who is being seen today for an episodic care visit.  HPI information obtained from: facility chart records, facility staff, patient report and [facility] chart review.     Met with Mr. Pearce today for follow up.  Mr. Pearce has a moderate-advanced degree of cognitive/memory impairment, but today reports he feels well.  He did report a vague episode of light headedness but is unsure if it was yesterday or last week, none today.  He does not think he has had any other \"Spells\" but not sure.  He has no other complaints.  RN's report no spells that they are aware of.     Past Medical and Surgical History reviewed in Epic today.    MEDICATIONS:  Current Outpatient Medications   Medication Sig Dispense Refill     acetaminophen (TYLENOL) 500 MG tablet Take 500-1,000 mg by mouth every 6 hours as needed for mild pain       AMITRIPTYLINE HCL PO Take 25 mg by mouth nightly as needed for sleep       aspirin EC 81 MG EC tablet Take 1 tablet (81 mg) by mouth daily 30 tablet 0     atorvastatin (LIPITOR) 20 MG tablet Take 2 tablets (40 mg) by mouth daily 30 tablet 0     bisacodyl (DULCOLAX) 10 MG Suppository Place 10 mg rectally daily as needed for constipation       blood glucose monitoring (NO BRAND SPECIFIED) meter device kit Use to test blood sugar bid times daily or as directed. 1 kit 0     blood glucose monitoring (TRUE METRIX BLOOD GLUCOSE TEST) test strip USE TO TEST BLOOD SUGAR THREE TIMES DAILY OR AS DIRECTED 300 strip 1     DULoxetine (CYMBALTA) 30 MG EC capsule TAKE 1 CAPSULE(30 MG) BY MOUTH DAILY 90 capsule 3     insulin aspart (NOVOLOG PEN) 100 UNIT/ML pen Inject 0-12 Units Subcutaneous 3 times daily (with meals) Check glucose then give SQ " to self TID (Breakfast, Lunch, Dinner.), if needed.   For glucose < 200, no insulin.  For 201-249 give 2 units.   For 250-299 give 4 units.   For 300-349 give 6 units.   For 350-399 give 8 units.   For 400-449 give 10 units.   For >= 450 give 12 units.       insulin glargine (BASAGLAR KWIKPEN) 100 UNIT/ML pen Inject 26 Units Subcutaneous At Bedtime 15 mL 11     ipratropium (ATROVENT) 0.03 % nasal spray INHALE 1 SPRAY IN EACH NOSTRIL EVERY 12 HOURS AS NEEDED 30 mL 11     levETIRAcetam (KEPPRA) 500 MG tablet Take 1 tablet (500 mg) by mouth 2 times daily       MAGNESIUM-OXIDE 400 (241.3 Mg) MG tablet TAKE 1 TABLET BY MOUTH TWICE DAILY 180 tablet 1     metFORMIN (GLUCOPHAGE) 1000 MG tablet Take 1 tablet (1,000 mg) by mouth 2 times daily (with meals) 180 tablet 3     metoprolol succinate ER (TOPROL-XL) 25 MG 24 hr tablet Take 12.5 mg by mouth At Bedtime       nitroGLYcerin (NITROSTAT) 0.4 MG sublingual tablet For chest pain place 1 tablet under the tongue every 5 minutes for 3 doses. If symptoms persist 5 minutes after 1st dose call 911. 25 tablet 0     omeprazole (PRILOSEC) 40 MG DR capsule TAKE 1 CAPSULE(40 MG) BY MOUTH DAILY 30 TO 60 MINUTES BEFORE A MEAL 90 capsule 3     order for DME Equipment being ordered: True Matrix Blood Glucose meter. 1 Act 11     polyethylene glycol (MIRALAX/GLYCOLAX) Packet Take 17 g by mouth daily       polyethylene glycol-propylene glycol (SYSTANE ULTRA) 0.4-0.3 % SOLN ophthalmic solution Place 2 drops into both eyes daily as needed for dry eyes       sennosides (SENOKOT) 8.6 MG tablet Take 2 tablets by mouth daily       tamsulosin (FLOMAX) 0.4 MG capsule Take 0.4 mg by mouth At Bedtime       ticagrelor (BRILINTA) 90 MG tablet Take 1 tablet (90 mg) by mouth 2 times daily       REVIEW OF SYSTEMS:  Limited secondary to cognitive impairment but today 7 point ROS done including, light headedness/dizziness, fever/chills, pain, Resp, CV, GI, and  and is negative other than noted in HPI.   "    Objective:  /74   Pulse 66   Temp 97  F (36.1  C)   Resp 16   Ht 1.676 m (5' 6\")   Wt 72.3 kg (159 lb 6.4 oz)   SpO2 97%   BMI 25.73 kg/m       Exam:  GENERAL APPEARANCE:  Elderly male sitting on side of bed, NAD, non-toxic.  ENT:  Mouth and oropharynx normal, moist mucous membranes.   RESP:  Lungs CTA.  Regular relaxed breathing effort.  No cough.   CV: S1/S2 no murmur or rubs.  Regular rhythm and rate.  No generalized edema.   ABDOMEN:   Flat, soft, non-tender with active bowel sounds.  No guarding, rigidity, or rebound tenderness.  EXTREMITIES:  No lower extremity edema, no calf tenderness.   PSYCH: Alert to self, somewhat to situation, not to place, date.  Suspect some degree of cognitive/memory impairment.  Stable/unchanged.     Labs:   I have personally reviewed glucose checks.     ASSESSMENT/PLAN:  Spell of altered cognition  Spell of change in speech  Seizure disorder (H)  H/O TIA (transient ischemic attack) and stroke  Was having spells of slurred speech, cognitive changes, motor/function changes, facial droop and presentation would change during these spells, had a few this year.  Last one he went back to hospital.  Etiology remains unclear, seizures vs. TIA/CVA's.  Currently speech is clear, no facial droop.  He does have ongoing mod-adv cognitive impairment.  One of the suspicions was hs Plavix was found to be non-effective, thus he was changed to Brilinta and came to the TCU for ongoing cares and PT/OT.  At this time we are not aware of any spells since being here.   -Continues with Brilinta.   -Continues with Keppra.   -On ASA and statin.   --Has Neuro f/u 9/27.     Chronic combined systolic and diastolic congestive heart failure (H)  Essential hypertension  Review of VS's show VSS and within acceptable range.   No current s/s of clinical CHF.   -No changes, continue to clinically monitor.     Type 2 diabetes mellitus with complication, with long-term current use of insulin (H)  Review " of QID glucose checks show ongoing adequate daily control.   -Continue QID glucoses.     Benign prostatic hyperplasia without lower urinary tract symptoms  Denies any retention symptoms.   -Continues Flomax.     Cognitive impairment  Mod-Advanced but stable/unchanged.   -Continue to clinically monitor.     Debility  -Continues with PT/OT.     Orders written by provider at facility  -NNO.    Electronically signed by:  ASPEN Silva CNP

## 2019-09-06 NOTE — LETTER
"    9/6/2019        RE: Dustin Pearce  [address]  [facility] [address]        [place] [facility]  [place] Medical Record Number:  [id number]  Place of Service where encounter took place:  [facility] [[id number]]  Chief Complaint   Patient presents with     RECHECK       HPI:    Dustin Pearce  is a 89 year old (1/31/1928), who is being seen today for an episodic care visit.  HPI information obtained from: facility chart records, facility staff, patient report and [facility] chart review.     Met with Mr. Pearce today for follow up.  Mr. Pearce has a moderate-advanced degree of cognitive/memory impairment, but today reports he feels well.  He did report a vague episode of light headedness but is unsure if it was yesterday or last week, none today.  He does not think he has had any other \"Spells\" but not sure.  He has no other complaints.  RN's report no spells that they are aware of.     Past Medical and Surgical History reviewed in Epic today.    MEDICATIONS:  Current Outpatient Medications   Medication Sig Dispense Refill     acetaminophen (TYLENOL) 500 MG tablet Take 500-1,000 mg by mouth every 6 hours as needed for mild pain       AMITRIPTYLINE HCL PO Take 25 mg by mouth nightly as needed for sleep       aspirin EC 81 MG EC tablet Take 1 tablet (81 mg) by mouth daily 30 tablet 0     atorvastatin (LIPITOR) 20 MG tablet Take 2 tablets (40 mg) by mouth daily 30 tablet 0     bisacodyl (DULCOLAX) 10 MG Suppository Place 10 mg rectally daily as needed for constipation       blood glucose monitoring (NO BRAND SPECIFIED) meter device kit Use to test blood sugar bid times daily or as directed. 1 kit 0     blood glucose monitoring (TRUE METRIX BLOOD GLUCOSE TEST) test strip USE TO TEST BLOOD SUGAR THREE TIMES DAILY OR AS DIRECTED 300 strip 1     DULoxetine (CYMBALTA) 30 MG EC capsule TAKE 1 CAPSULE(30 MG) BY MOUTH DAILY 90 capsule 3     insulin aspart (NOVOLOG PEN) 100 " UNIT/ML pen Inject 0-12 Units Subcutaneous 3 times daily (with meals) Check glucose then give SQ to self TID (Breakfast, Lunch, Dinner.), if needed.   For glucose < 200, no insulin.  For 201-249 give 2 units.   For 250-299 give 4 units.   For 300-349 give 6 units.   For 350-399 give 8 units.   For 400-449 give 10 units.   For >= 450 give 12 units.       insulin glargine (BASAGLAR KWIKPEN) 100 UNIT/ML pen Inject 26 Units Subcutaneous At Bedtime 15 mL 11     ipratropium (ATROVENT) 0.03 % nasal spray INHALE 1 SPRAY IN EACH NOSTRIL EVERY 12 HOURS AS NEEDED 30 mL 11     levETIRAcetam (KEPPRA) 500 MG tablet Take 1 tablet (500 mg) by mouth 2 times daily       MAGNESIUM-OXIDE 400 (241.3 Mg) MG tablet TAKE 1 TABLET BY MOUTH TWICE DAILY 180 tablet 1     metFORMIN (GLUCOPHAGE) 1000 MG tablet Take 1 tablet (1,000 mg) by mouth 2 times daily (with meals) 180 tablet 3     metoprolol succinate ER (TOPROL-XL) 25 MG 24 hr tablet Take 12.5 mg by mouth At Bedtime       nitroGLYcerin (NITROSTAT) 0.4 MG sublingual tablet For chest pain place 1 tablet under the tongue every 5 minutes for 3 doses. If symptoms persist 5 minutes after 1st dose call 911. 25 tablet 0     omeprazole (PRILOSEC) 40 MG DR capsule TAKE 1 CAPSULE(40 MG) BY MOUTH DAILY 30 TO 60 MINUTES BEFORE A MEAL 90 capsule 3     order for DME Equipment being ordered: True Matrix Blood Glucose meter. 1 Act 11     polyethylene glycol (MIRALAX/GLYCOLAX) Packet Take 17 g by mouth daily       polyethylene glycol-propylene glycol (SYSTANE ULTRA) 0.4-0.3 % SOLN ophthalmic solution Place 2 drops into both eyes daily as needed for dry eyes       sennosides (SENOKOT) 8.6 MG tablet Take 2 tablets by mouth daily       tamsulosin (FLOMAX) 0.4 MG capsule Take 0.4 mg by mouth At Bedtime       ticagrelor (BRILINTA) 90 MG tablet Take 1 tablet (90 mg) by mouth 2 times daily       REVIEW OF SYSTEMS:  Limited secondary to cognitive impairment but today 7 point ROS done including, light  "headedness/dizziness, fever/chills, pain, Resp, CV, GI, and  and is negative other than noted in HPI.      Objective:  /74   Pulse 66   Temp 97  F (36.1  C)   Resp 16   Ht 1.676 m (5' 6\")   Wt 72.3 kg (159 lb 6.4 oz)   SpO2 97%   BMI 25.73 kg/m        Exam:  GENERAL APPEARANCE:  Elderly male  sitting on side of bed, NAD, non-toxic.  ENT:  Mouth and oropharynx normal, moist mucous membranes.   RESP:  Lungs CTA.  Regular relaxed breathing effort.  No cough.   CV: S1/S2 no murmur or rubs.  Regular rhythm and rate.  No generalized edema.   ABDOMEN:   Flat, soft, non-tender with active bowel sounds.  No guarding, rigidity, or rebound tenderness.  EXTREMITIES:  No lower extremity edema, no calf tenderness.   PSYCH: Alert to self, somewhat to situation, not to place, date.  Suspect some degree of cognitive/memory impairment.  Stable/unchanged.     Labs:   I have personally reviewed glucose checks.     ASSESSMENT/PLAN:  Spell of altered cognition  Spell of change in speech  Seizure disorder (H)  H/O TIA (transient ischemic attack) and stroke  Was having spells of slurred speech, cognitive changes, motor/function changes, facial droop and presentation would change during these spells, had a few this year.  Last one he went back to hospital.  Etiology remains unclear, seizures vs. TIA/CVA's.  Currently speech is clear, no facial droop.  He does have ongoing mod-adv cognitive impairment.  One of the suspicions was hs Plavix was found to be non-effective, thus he was changed to Brilinta and came to the TCU for ongoing cares and PT/OT.  At this time we are not aware of any spells since being here.   -Continues with Brilinta.   -Continues with Keppra.   -On ASA and statin.   --Has Neuro f/u 9/27.     Chronic combined systolic and diastolic congestive heart failure (H)  Essential hypertension  Review of VS's show VSS and within acceptable range.   No current s/s of clinical CHF.   -No changes, continue to clinically " monitor.     Type 2 diabetes mellitus with complication, with long-term current use of insulin (H)  Review of QID glucose checks show ongoing adequate daily control.   -Continue QID glucoses.     Benign prostatic hyperplasia without lower urinary tract symptoms  Denies any retention symptoms.   -Continues Flomax.     Cognitive impairment  Mod-Advanced but stable/unchanged.   -Continue to clinically monitor.     Debility  -Continues with PT/OT.     Orders written by provider at facility  -NNO.    Electronically signed by:  ASPEN Silva CNP       Sincerely,        ASPEN Silva CNP

## 2019-09-09 NOTE — PROGRESS NOTES
"[facility]  [place] Medical Record Number:  [id number]  Place of Service where encounter took place:  [facility] [[id number]]  Chief Complaint   Patient presents with     RECHECK       HPI:    Dustin Pearce  is a 89 year old (1/31/1928), who is being seen today for an episodic care visit.  HPI information obtained from: facility chart records, facility staff, patient report and [facility] chart review.    Met with Mr. Pearce today for follow up.  He does not remember meeting us Friday and is upset because he was thinking we skipped seeing him.  He does have mod-adv cognitive impairment, but RN's and he himself do not feel/note any more \"Spells\".  Besides thinking we missed an appointment he does not remember, he has no other complaints.     Past Medical and Surgical History reviewed in Epic today.    MEDICATIONS:  Current Outpatient Medications   Medication Sig Dispense Refill     acetaminophen (TYLENOL) 500 MG tablet Take 500-1,000 mg by mouth every 6 hours as needed for mild pain       aspirin EC 81 MG EC tablet Take 1 tablet (81 mg) by mouth daily 30 tablet 0     atorvastatin (LIPITOR) 20 MG tablet Take 2 tablets (40 mg) by mouth daily 30 tablet 0     bisacodyl (DULCOLAX) 10 MG Suppository Place 10 mg rectally daily as needed for constipation       blood glucose monitoring (NO BRAND SPECIFIED) meter device kit Use to test blood sugar bid times daily or as directed. 1 kit 0     blood glucose monitoring (TRUE METRIX BLOOD GLUCOSE TEST) test strip USE TO TEST BLOOD SUGAR THREE TIMES DAILY OR AS DIRECTED 300 strip 1     DULoxetine (CYMBALTA) 30 MG EC capsule TAKE 1 CAPSULE(30 MG) BY MOUTH DAILY 90 capsule 3     insulin aspart (NOVOLOG PEN) 100 UNIT/ML pen Inject 0-12 Units Subcutaneous 3 times daily (with meals) Check glucose then give SQ to self TID (Breakfast, Lunch, Dinner.), if needed.   For glucose < 200, no insulin.  For 201-249 give 2 units.   For 250-299 give 4 " "units.   For 300-349 give 6 units.   For 350-399 give 8 units.   For 400-449 give 10 units.   For >= 450 give 12 units.       insulin glargine (BASAGLAR KWIKPEN) 100 UNIT/ML pen Inject 26 Units Subcutaneous At Bedtime 15 mL 11     ipratropium (ATROVENT) 0.03 % nasal spray INHALE 1 SPRAY IN EACH NOSTRIL EVERY 12 HOURS AS NEEDED 30 mL 11     levETIRAcetam (KEPPRA) 500 MG tablet Take 1 tablet (500 mg) by mouth 2 times daily       MAGNESIUM-OXIDE 400 (241.3 Mg) MG tablet TAKE 1 TABLET BY MOUTH TWICE DAILY 180 tablet 1     metFORMIN (GLUCOPHAGE) 1000 MG tablet Take 1 tablet (1,000 mg) by mouth 2 times daily (with meals) 180 tablet 3     metoprolol succinate ER (TOPROL-XL) 25 MG 24 hr tablet Take 12.5 mg by mouth At Bedtime       nitroGLYcerin (NITROSTAT) 0.4 MG sublingual tablet For chest pain place 1 tablet under the tongue every 5 minutes for 3 doses. If symptoms persist 5 minutes after 1st dose call 911. 25 tablet 0     omeprazole (PRILOSEC) 40 MG DR capsule TAKE 1 CAPSULE(40 MG) BY MOUTH DAILY 30 TO 60 MINUTES BEFORE A MEAL 90 capsule 3     order for DME Equipment being ordered: [employer] Blood Glucose meter. 1 Act 11     polyethylene glycol (MIRALAX/GLYCOLAX) Packet Take 17 g by mouth daily       polyethylene glycol-propylene glycol (SYSTANE ULTRA) 0.4-0.3 % SOLN ophthalmic solution Place 2 drops into both eyes daily as needed for dry eyes       sennosides (SENOKOT) 8.6 MG tablet Take 2 tablets by mouth daily       tamsulosin (FLOMAX) 0.4 MG capsule Take 0.4 mg by mouth At Bedtime       ticagrelor (BRILINTA) 90 MG tablet Take 1 tablet (90 mg) by mouth 2 times daily         REVIEW OF SYSTEMS:  Limited secondary to cognitive impairment but today 7 point ROS done including, light headedness/dizziness, fever/chills, pain, Resp, CV, GI, and  and is negative other than noted in HPI.     Objective:  /62   Pulse 69   Temp 97.2  F (36.2  C)   Resp 16   Ht 1.676 m (5' 6\")   Wt 70.6 kg (155 lb 9.6 oz)   SpO2 " "97%   BMI 25.11 kg/m       Exam:  GENERAL APPEARANCE:  Elderly male resting in bed, NAD, non-toxic.  ENT:  Mouth and oropharynx normal, moist mucous membranes.   RESP:  Lungs CTA.  Regular relaxed breathing effort.  No cough.   CV: S1/S2 no murmur or rubs.  Regular rhythm and rate.  No generalized edema.   ABDOMEN:   Flat, soft, non-tender with active bowel sounds.  No guarding, rigidity, or rebound tenderness.  EXTREMITIES:  No lower extremity edema, no calf tenderness.   PSYCH: Alert to self, somewhat to situation, not to place, date.  Suspect some degree of cognitive/memory impairment.  Stable/unchanged.     Labs:   I have personally reviewed glucose checks.     ASSESSMENT/PLAN:  Seizure disorder (H)  H/O TIA (transient ischemic attack) and stroke  No evidence of recurrent \"Spells\"  -Continues ASA and Statin.   -Continues newly started Brilinta.     Chronic combined systolic and diastolic congestive heart failure (H)  Essential hypertension  Review of VS's show VSS and within acceptable range.   No current s/s of clinical CHF.   -No changes, continue to clinically monitor.     Type 2 diabetes mellitus with complication, with long-term current use of insulin (H)  Review of TID glucoses show ongoing adequate daily control.   -Continue TID Glucoses.   -Continue at bedtime Basaglar and BID Metformin.   -Is on correction Aspart while at TCU but will likely DC upon discharge.     Benign prostatic hyperplasia without lower urinary tract symptoms  Denies any symptoms.   -Continues PTA Flomax.     Cognitive impairment  Mod-advanced but no changes.   -Continue to clinically monitor.     Debility  -Continues with PT/OT.     Orders written by provider at facility  -DC'd PRN Amitriptyline due to non use and age.     Electronically signed by:  ASPEN Silva CNP       "

## 2019-09-09 NOTE — LETTER
"    9/9/2019        RE: Dustin Pearce  [address]  [facility] [place] [facility] [address]        [place] [facility]  [place] Medical Record Number:  [id number]  Place of Service where encounter took place:  [facility] [[id number]]  Chief Complaint   Patient presents with     RECHECK       HPI:    Dustin Pearce  is a 89 year old (1/31/1928), who is being seen today for an episodic care visit.  HPI information obtained from: facility chart records, facility staff, patient report and [facility] chart review.    Met with Mr. Pearce today for follow up.  He does not remember meeting us Friday and is upset because he was thinking we skipped seeing him.  He does have mod-adv cognitive impairment, but RN's and he himself do not feel/note any more \"Spells\".  Besides thinking we missed an appointment he does not remember, he has no other complaints.     Past Medical and Surgical History reviewed in Epic today.    MEDICATIONS:  Current Outpatient Medications   Medication Sig Dispense Refill     acetaminophen (TYLENOL) 500 MG tablet Take 500-1,000 mg by mouth every 6 hours as needed for mild pain       aspirin EC 81 MG EC tablet Take 1 tablet (81 mg) by mouth daily 30 tablet 0     atorvastatin (LIPITOR) 20 MG tablet Take 2 tablets (40 mg) by mouth daily 30 tablet 0     bisacodyl (DULCOLAX) 10 MG Suppository Place 10 mg rectally daily as needed for constipation       blood glucose monitoring (NO BRAND SPECIFIED) meter device kit Use to test blood sugar bid times daily or as directed. 1 kit 0     blood glucose monitoring (TRUE METRIX BLOOD GLUCOSE TEST) test strip USE TO TEST BLOOD SUGAR THREE TIMES DAILY OR AS DIRECTED 300 strip 1     DULoxetine (CYMBALTA) 30 MG EC capsule TAKE 1 CAPSULE(30 MG) BY MOUTH DAILY 90 capsule 3     insulin aspart (NOVOLOG PEN) 100 UNIT/ML pen Inject 0-12 Units Subcutaneous 3 times daily (with meals) Check glucose then give SQ to self TID (Breakfast, Lunch, Dinner.), if " needed.   For glucose < 200, no insulin.  For 201-249 give 2 units.   For 250-299 give 4 units.   For 300-349 give 6 units.   For 350-399 give 8 units.   For 400-449 give 10 units.   For >= 450 give 12 units.       insulin glargine (BASAGLAR KWIKPEN) 100 UNIT/ML pen Inject 26 Units Subcutaneous At Bedtime 15 mL 11     ipratropium (ATROVENT) 0.03 % nasal spray INHALE 1 SPRAY IN EACH NOSTRIL EVERY 12 HOURS AS NEEDED 30 mL 11     levETIRAcetam (KEPPRA) 500 MG tablet Take 1 tablet (500 mg) by mouth 2 times daily       MAGNESIUM-OXIDE 400 (241.3 Mg) MG tablet TAKE 1 TABLET BY MOUTH TWICE DAILY 180 tablet 1     metFORMIN (GLUCOPHAGE) 1000 MG tablet Take 1 tablet (1,000 mg) by mouth 2 times daily (with meals) 180 tablet 3     metoprolol succinate ER (TOPROL-XL) 25 MG 24 hr tablet Take 12.5 mg by mouth At Bedtime       nitroGLYcerin (NITROSTAT) 0.4 MG sublingual tablet For chest pain place 1 tablet under the tongue every 5 minutes for 3 doses. If symptoms persist 5 minutes after 1st dose call 911. 25 tablet 0     omeprazole (PRILOSEC) 40 MG DR capsule TAKE 1 CAPSULE(40 MG) BY MOUTH DAILY 30 TO 60 MINUTES BEFORE A MEAL 90 capsule 3     order for DME Equipment being ordered: True Matrix Blood Glucose meter. 1 Act 11     polyethylene glycol (MIRALAX/GLYCOLAX) Packet Take 17 g by mouth daily       polyethylene glycol-propylene glycol (SYSTANE ULTRA) 0.4-0.3 % SOLN ophthalmic solution Place 2 drops into both eyes daily as needed for dry eyes       sennosides (SENOKOT) 8.6 MG tablet Take 2 tablets by mouth daily       tamsulosin (FLOMAX) 0.4 MG capsule Take 0.4 mg by mouth At Bedtime       ticagrelor (BRILINTA) 90 MG tablet Take 1 tablet (90 mg) by mouth 2 times daily         REVIEW OF SYSTEMS:  Limited secondary to cognitive impairment but today 7 point ROS done including, light headedness/dizziness, fever/chills, pain, Resp, CV, GI, and  and is negative other than noted in HPI.     Objective:  /62   Pulse 69   Temp  "97.2  F (36.2  C)   Resp 16   Ht 1.676 m (5' 6\")   Wt 70.6 kg (155 lb 9.6 oz)   SpO2 97%   BMI 25.11 kg/m        Exam:  GENERAL APPEARANCE:  Elderly male resting in bed, NAD, non-toxic.  ENT:  Mouth and oropharynx normal, moist mucous membranes.   RESP:  Lungs CTA.  Regular relaxed breathing effort.  No cough.   CV: S1/S2 no murmur or rubs.  Regular rhythm and rate.  No generalized edema.   ABDOMEN:   Flat, soft, non-tender with active bowel sounds.  No guarding, rigidity, or rebound tenderness.  EXTREMITIES:  No lower extremity edema, no calf tenderness.   PSYCH: Alert to self, somewhat to situation, not to place, date.  Suspect some degree of cognitive/memory impairment.  Stable/unchanged.     Labs:   I have personally reviewed glucose checks.     ASSESSMENT/PLAN:  Seizure disorder (H)  H/O TIA (transient ischemic attack) and stroke  No evidence of recurrent \"Spells\"  -Continues ASA and Statin.   -Continues newly started Brilinta.     Chronic combined systolic and diastolic congestive heart failure (H)  Essential hypertension  Review of VS's show VSS and within acceptable range.   No current s/s of clinical CHF.   -No changes, continue to clinically monitor.     Type 2 diabetes mellitus with complication, with long-term current use of insulin (H)  Review of TID glucoses show ongoing adequate daily control.   -Continue TID Glucoses.   -Continue at bedtime Basaglar and BID Metformin.   -Is on correction Aspart while at TCU but will likely DC upon discharge.     Benign prostatic hyperplasia without lower urinary tract symptoms  Denies any symptoms.   -Continues PTA Flomax.     Cognitive impairment  Mod-advanced but no changes.   -Continue to clinically monitor.     Debility  -Continues with PT/OT.     Orders written by provider at facility  -DC'd PRN Amitriptyline due to non use and age.     Electronically signed by:  ASPEN Silva CNP           Sincerely,        ASPEN Silva CNP    "

## 2019-09-13 NOTE — PROGRESS NOTES
"[facility] DISCHARGE SUMMARY  PATIENT'S NAME: Dustin Pearce  YOB: 1928  MEDICAL RECORD NUMBER:  [id number]  Place of Service where encounter took place:  [facility] [[id number]]    PRIMARY CARE PROVIDER AND CLINIC RESPONSIBLE AFTER TRANSFER:   Matt Morrissey MD, [address] / [place] [address]      Transferring providers: Alexandre Petty, ASPEN GRAVES, Fe Kim MD  Recent Hospitalization/ED:  [facility] stay 8/24/2019 to 8/29/2019.  Date of SNF Admission: August / 29 / 2019  Date of SNF (anticipated) Discharge: September / 14 / 2019  Discharged to: with family , significant other  Cognitive Scores: SLUMS: 7/30 and CPT: 3.9/5.6  Physical Function: TUG 21.3 sec, thus increased fall risk; 300ft with 4ww and SBA.   DME: [facility]    CODE STATUS/ADVANCE DIRECTIVES DISCUSSION:  Full Code     ALLERGIES: Oxycodone; Sulfa drugs; and Tetracycline    DISCHARGE DIAGNOSIS/NURSING FACILITY COURSE:   Mr. Pearce is a 89 y/o complex male with a history of vasculopathy, h/o TIA/CVA, CAD s/p MODESTA, ICA occlusion, DM II and seizures whom has been having \"Spells\" all year starting in April and has been in/out of ER recently due to expressive aphasia, dysarthria, facial drooping, cognitive changes, loss of motor function/spells, all varying at times. Lately was in ER June 30th, July 16th and now again Aug 24th.  Has been followed by Neurology and has under gone extensive testing and they note etiology is not clear but they seem to note a more concern for seizure etiology, yet treatment has been more for CVA/TIA concern; thus clear etiology of ongoing spells is unclear.  They did note this last admission a P2Y12 test showed inadequate response to Plavix thus they switched him to Brilinta 8/25.  His Keppra dose was adjusted, lowered due to somnolence.  He then was discharged to the TCU/rehab for ongoing cares, PT/OT and monitoring.      While at " the TCU Mr. Pearce did well.  He has a mod-advanced degree of cognitive impairment, but no behavior issues. He has not had any spells since discharge from the hospital.  Has continued on his newly started Brilinta and prior Keppra.  He will return home with his significant other and home care.  He does have Neurology f/u planned.     In meeting Mr. Pearce today for follow up, he reports he has been doing well.  He does have cognitive/memory impairment but reports no complaints.  Is excited/glad to be returning home.     Spell of change in speech  Spell of altered cognition  Seizure disorder (H)  H/O TIA (transient ischemic attack) and stroke  History of CEA (carotid endarterectomy), Right  No spells while at the TCU since leaving the hospital.  He does have mod-advanced cognitive impairment, but significant other feels he's baseline.  As noted above, etiology into spells is unclear.    -Continues newly started Brilinta.   -Continues Keppra.   -Continues ASA.  --Has Neuro f/u on/around 27 Sept.     Coronary artery disease involving native heart without angina pectoris, unspecified vessel or lesion type, h/o MODESTA  Chronic combined systolic and diastolic congestive heart failure (H)  Essential hypertension  Review of VS's show VSS and within acceptable range.   No current s/s of clinical CHF.   -Continues ASA and Statin.   -Continues PTA Magnesium.   -Continues Toprol.      Type 2 diabetes mellitus with complication, with long-term current use of insulin (H)  A1c 8.3 in July.  We did add on sliding scale Aspart while at the TCU but he rarely needed it, thus we stopped it upon discharge.  He reports QID glucose testing at home.  Glucoses for us ranged 130-240's.  Thus likely does not need to continue QID testing.   -Continues Basaglar 26 units at bedtime.   -Continues BID Metformin.   -Continues QID glucose testing for now, PCP can adjust per their discretion.     Benign prostatic hyperplasia without lower urinary tract  symptoms  Has h/o BPH and is on Flomax.  Denies any symptoms.   -Continue PTA Flomax.     Cognitive impairment  Mod-advanced cognitive/memory impairment, stable/unchanged.     Debility  -Will DC with  home care.   -See Epic order for F2F.     Past Medical History:  has a past medical history of Abdominal aortic aneurysm (H) (12/25/2016), Acute on chronic systolic congestive heart failure (H) (6/7/2018), Anemia (6/7/2018), Anemia due to blood loss, acute (6/13/2017), Aortic stenosis, Benign essential hypertension (1/6/2017), Benign non-nodular prostatic hyperplasia with lower urinary tract symptoms (10/20/2016), CAD (coronary artery disease), Carotid artery stenosis (10/20/2016), Cerebrovascular accident (H) (10/20/2016), Cognitive impairment (6/7/2018), Coronary artery disease of native artery of native heart with stable angina pectoris (H) (10/20/2016), Depression, unspecified depression type (2/22/2018), Diabetes mellitus (H), Diabetic ulcer of left foot associated with diabetes mellitus due to underlying condition (H) (6/12/2017), 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) (6/12/2017), Gastro-oesophageal reflux disease, Gastroesophageal reflux disease without esophagitis (10/20/2016), Heart attack (H), Hyperlipidemia, Hyperlipidemia, unspecified hyperlipidemia type (10/20/2016), Ischemic cardiomyopathy, Lumbar back pain (12/30/2016), Mild cognitive impairment (10/20/2016), Nephrolithiasis, NSTEMI (non-ST elevated myocardial infarction) (H) (6/7/2018), Osteopenia, PAD (peripheral artery disease) (H), Paroxysmal atrial fibrillation (H), Peripheral artery disease (H), RBBB with left anterior fascicular block, Slow transit constipation (2/22/2018), Stroke of unknown etiology (H) (12/31/2017), Syncope, TIA (transient ischemic attack) (1/20/2017), Unspecified cerebral artery occlusion with cerebral infarction, UTI (urinary tract infection) due to Enterococcus (2/22/2018), and Ventricular tachycardia (H). He  also has no past medical history of Difficult intubation or Malignant hyperthermia.    Discharge Medications:  Current Outpatient Medications   Medication Sig Dispense Refill     acetaminophen (TYLENOL) 500 MG tablet Take 500-1,000 mg by mouth every 6 hours as needed for mild pain       aspirin EC 81 MG EC tablet Take 1 tablet (81 mg) by mouth daily 30 tablet 0     atorvastatin (LIPITOR) 20 MG tablet Take 2 tablets (40 mg) by mouth daily 30 tablet 0     bisacodyl (DULCOLAX) 10 MG Suppository Place 10 mg rectally daily as needed for constipation       blood glucose monitoring (NO BRAND SPECIFIED) meter device kit Use to test blood sugar bid times daily or as directed. 1 kit 0     blood glucose monitoring (TRUE METRIX BLOOD GLUCOSE TEST) test strip USE TO TEST BLOOD SUGAR THREE TIMES DAILY OR AS DIRECTED 300 strip 1     DULoxetine (CYMBALTA) 30 MG EC capsule TAKE 1 CAPSULE(30 MG) BY MOUTH DAILY 90 capsule 3     insulin glargine (BASAGLAR KWIKPEN) 100 UNIT/ML pen Inject 26 Units Subcutaneous At Bedtime 15 mL 11     ipratropium (ATROVENT) 0.03 % nasal spray INHALE 1 SPRAY IN EACH NOSTRIL EVERY 12 HOURS AS NEEDED 30 mL 11     levETIRAcetam (KEPPRA) 500 MG tablet Take 1 tablet (500 mg) by mouth 2 times daily       MAGNESIUM-OXIDE 400 (241.3 Mg) MG tablet TAKE 1 TABLET BY MOUTH TWICE DAILY 180 tablet 1     metFORMIN (GLUCOPHAGE) 1000 MG tablet Take 1 tablet (1,000 mg) by mouth 2 times daily (with meals) 180 tablet 3     metoprolol succinate ER (TOPROL-XL) 25 MG 24 hr tablet Take 12.5 mg by mouth At Bedtime       nitroGLYcerin (NITROSTAT) 0.4 MG sublingual tablet For chest pain place 1 tablet under the tongue every 5 minutes for 3 doses. If symptoms persist 5 minutes after 1st dose call 911. 25 tablet 0     omeprazole (PRILOSEC) 40 MG DR capsule TAKE 1 CAPSULE(40 MG) BY MOUTH DAILY 30 TO 60 MINUTES BEFORE A MEAL 90 capsule 3     order for DME Equipment being ordered: True Matrix Blood Glucose meter. 1 Act 11      "polyethylene glycol (MIRALAX/GLYCOLAX) Packet Take 17 g by mouth daily       polyethylene glycol-propylene glycol (SYSTANE ULTRA) 0.4-0.3 % SOLN ophthalmic solution Place 2 drops into both eyes daily as needed for dry eyes       sennosides (SENOKOT) 8.6 MG tablet Take 2 tablets by mouth daily       tamsulosin (FLOMAX) 0.4 MG capsule Take 0.4 mg by mouth At Bedtime       ticagrelor (BRILINTA) 90 MG tablet Take 1 tablet (90 mg) by mouth 2 times daily       Medication Changes/Rationale:   -As noted above.     Controlled medications sent with patient:   not applicable/none     ROS:   Limited secondary to cognitive impairment but today 7 point ROS done including, light headedness/dizziness, fever/chills, pain, Resp, CV, GI, and  and is negative other than noted in HPI.      Physical Exam:   Vitals: /68   Pulse 65   Temp 97.3  F (36.3  C)   Resp 16   Ht 1.676 m (5' 6\")   Wt 71.3 kg (157 lb 3.2 oz)   SpO2 97%   BMI 25.37 kg/m    BMI= Body mass index is 25.37 kg/m .     GENERAL APPEARANCE:  Elderly male sitting up in recliner, NAD, non-toxic.  ENT:  Mouth and oropharynx normal, moist mucous membranes.   RESP:  Lungs CTA.  Regular relaxed breathing effort.  No cough.   CV: S1/S2 no murmur or rubs.  Regular rhythm and rate.  No generalized edema.   ABDOMEN:   Flat, soft, non-tender with active bowel sounds.  No guarding, rigidity, or rebound tenderness.  EXTREMITIES:  No lower extremity edema, no calf tenderness.   PSYCH: Alert to self, somewhat to situation, not to place, date.  Suspect some degree of cognitive/memory impairment.  Stable/unchanged.     SNF labs: Labs done in SNF are in [facility] EPIC. Please refer to them using [employer]/Care Everywhere.    DISCHARGE PLAN:    Follow up labs: No labs orders/due    Medical Follow Up:      Follow up with primary care provider in 1-2 weeks   Follow up with Neurology as instructed.       MTM referral needed and placed by this provider: No    Current [facility] scheduled " appointments:  Next 5 appointments (look out 90 days)    Sep 20, 2019  2:30 PM CDT  [facility]  [facility] [address] [facility] [id number] [phone]           Discharge Services: Home Care:  Occupational Therapy, Physical Therapy, Registered Nurse, Home Health Aide and From:  [facility]    Discharge Instructions Verbalized to Patient at Discharge:     Instructed patient to arrange/attend above appointments.     TOTAL DISCHARGE TIME:   Greater than 30 minutes  Electronically signed by:  ASPEN Silva [facility]     Home care Face to Face documentation done in [facility] attached to Home care orders for [facility].

## 2019-09-13 NOTE — LETTER
"    9/13/2019        RE: Dustin eParce  [address]  [place] [address]        [facility] DISCHARGE SUMMARY  PATIENT'S NAME: Dustin Pearce  YOB: 1928  MEDICAL RECORD NUMBER:  [id number]  Place of Service where encounter took place:  [facility] [[id number]]    PRIMARY CARE PROVIDER AND CLINIC RESPONSIBLE AFTER TRANSFER:   Matt Morrissey MD, [address] / [place] [address]      Transferring providers: Alexandre Petty, ASPEN CNP, Fe Kim MD  Recent Hospitalization/ED:  [facility] stay 8/24/2019 to 8/29/2019.  Date of SNF Admission: August / 29 / 2019  Date of SNF (anticipated) Discharge: September / 14 / 2019  Discharged to: with family , significant other  Cognitive Scores: SLUMS: 7/30 and CPT: 3.9/5.6  Physical Function: TUG 21.3 sec, thus increased fall risk; 300ft with 4ww and SBA.   DME: [facility]    CODE STATUS/ADVANCE DIRECTIVES DISCUSSION:  Full Code     ALLERGIES: Oxycodone; Sulfa drugs; and Tetracycline    DISCHARGE DIAGNOSIS/NURSING FACILITY COURSE:   Mr. Pearce is a 89 y/o complex male with a history of vasculopathy, h/o TIA/CVA, CAD s/p MODESTA, ICA occlusion, DM II and seizures whom has been having \"Spells\" all year starting in April and has been in/out of ER recently due to expressive aphasia, dysarthria, facial drooping, cognitive changes, loss of motor function/spells, all varying at times. Lately was in ER June 30th, July 16th and now again Aug 24th.  Has been followed by Neurology and has under gone extensive testing and they note etiology is not clear but they seem to note a more concern for seizure etiology, yet treatment has been more for CVA/TIA concern; thus clear etiology of ongoing spells is unclear.  They did note this last admission a P2Y12 test showed inadequate response to Plavix thus they switched him to Brilinta 8/25.  His Keppra dose was adjusted, lowered due to somnolence.  He " then was discharged to the TCU/rehab for ongoing cares, PT/OT and monitoring.      While at the TCU Mr. Pearce did well.  He has a mod-advanced degree of cognitive impairment, but no behavior issues. He has not had any spells since discharge from the hospital.  Has continued on his newly started Brilinta and prior Keppra.  He will return home with his significant other and home care.  He does have Neurology f/u planned.     In meeting Mr. Pearce today for follow up, he reports he has been doing well.  He does have cognitive/memory impairment but reports no complaints.  Is excited/glad to be returning home.     Spell of change in speech  Spell of altered cognition  Seizure disorder (H)  H/O TIA (transient ischemic attack) and stroke  History of CEA (carotid endarterectomy), Right  No spells while at the TCU since leaving the hospital.  He does have mod-advanced cognitive impairment, but significant other feels he's baseline.  As noted above, etiology into spells is unclear.    -Continues newly started Brilinta.   -Continues Keppra.   -Continues ASA.  --Has Neuro f/u on/around 27 Sept.     Coronary artery disease involving native heart without angina pectoris, unspecified vessel or lesion type, h/o MODESTA  Chronic combined systolic and diastolic congestive heart failure (H)  Essential hypertension  Review of VS's show VSS and within acceptable range.   No current s/s of clinical CHF.   -Continues ASA and Statin.   -Continues PTA Magnesium.   -Continues Toprol.      Type 2 diabetes mellitus with complication, with long-term current use of insulin (H)  A1c 8.3 in July.  We did add on sliding scale Aspart while at the TCU but he rarely needed it, thus we stopped it upon discharge.  He reports QID glucose testing at home.  Glucoses for us ranged 130-240's.  Thus likely does not need to continue QID testing.   -Continues Basaglar 26 units at bedtime.   -Continues BID Metformin.   -Continues QID glucose testing for now, PCP  can adjust per their discretion.     Benign prostatic hyperplasia without lower urinary tract symptoms  Has h/o BPH and is on Flomax.  Denies any symptoms.   -Continue PTA Flomax.     Cognitive impairment  Mod-advanced cognitive/memory impairment, stable/unchanged.     Debility  -Will DC with  home care.   -See Epic order for F2F.     Past Medical History:  has a past medical history of Abdominal aortic aneurysm (H) (12/25/2016), Acute on chronic systolic congestive heart failure (H) (6/7/2018), Anemia (6/7/2018), Anemia due to blood loss, acute (6/13/2017), Aortic stenosis, Benign essential hypertension (1/6/2017), Benign non-nodular prostatic hyperplasia with lower urinary tract symptoms (10/20/2016), CAD (coronary artery disease), Carotid artery stenosis (10/20/2016), Cerebrovascular accident (H) (10/20/2016), Cognitive impairment (6/7/2018), Coronary artery disease of native artery of native heart with stable angina pectoris (H) (10/20/2016), Depression, unspecified depression type (2/22/2018), Diabetes mellitus (H), Diabetic ulcer of left foot associated with diabetes mellitus due to underlying condition (H) (6/12/2017), 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) (6/12/2017), Gastro-oesophageal reflux disease, Gastroesophageal reflux disease without esophagitis (10/20/2016), Heart attack (H), Hyperlipidemia, Hyperlipidemia, unspecified hyperlipidemia type (10/20/2016), Ischemic cardiomyopathy, Lumbar back pain (12/30/2016), Mild cognitive impairment (10/20/2016), Nephrolithiasis, NSTEMI (non-ST elevated myocardial infarction) (H) (6/7/2018), Osteopenia, PAD (peripheral artery disease) (H), Paroxysmal atrial fibrillation (H), Peripheral artery disease (H), RBBB with left anterior fascicular block, Slow transit constipation (2/22/2018), Stroke of unknown etiology (H) (12/31/2017), Syncope, TIA (transient ischemic attack) (1/20/2017), Unspecified cerebral artery occlusion with cerebral infarction, UTI  (urinary tract infection) due to Enterococcus (2/22/2018), and Ventricular tachycardia (H). He also has no past medical history of Difficult intubation or Malignant hyperthermia.    Discharge Medications:  Current Outpatient Medications   Medication Sig Dispense Refill     acetaminophen (TYLENOL) 500 MG tablet Take 500-1,000 mg by mouth every 6 hours as needed for mild pain       aspirin EC 81 MG EC tablet Take 1 tablet (81 mg) by mouth daily 30 tablet 0     atorvastatin (LIPITOR) 20 MG tablet Take 2 tablets (40 mg) by mouth daily 30 tablet 0     bisacodyl (DULCOLAX) 10 MG Suppository Place 10 mg rectally daily as needed for constipation       blood glucose monitoring (NO BRAND SPECIFIED) meter device kit Use to test blood sugar bid times daily or as directed. 1 kit 0     blood glucose monitoring (TRUE METRIX BLOOD GLUCOSE TEST) test strip USE TO TEST BLOOD SUGAR THREE TIMES DAILY OR AS DIRECTED 300 strip 1     DULoxetine (CYMBALTA) 30 MG EC capsule TAKE 1 CAPSULE(30 MG) BY MOUTH DAILY 90 capsule 3     insulin glargine (BASAGLAR KWIKPEN) 100 UNIT/ML pen Inject 26 Units Subcutaneous At Bedtime 15 mL 11     ipratropium (ATROVENT) 0.03 % nasal spray INHALE 1 SPRAY IN EACH NOSTRIL EVERY 12 HOURS AS NEEDED 30 mL 11     levETIRAcetam (KEPPRA) 500 MG tablet Take 1 tablet (500 mg) by mouth 2 times daily       MAGNESIUM-OXIDE 400 (241.3 Mg) MG tablet TAKE 1 TABLET BY MOUTH TWICE DAILY 180 tablet 1     metFORMIN (GLUCOPHAGE) 1000 MG tablet Take 1 tablet (1,000 mg) by mouth 2 times daily (with meals) 180 tablet 3     metoprolol succinate ER (TOPROL-XL) 25 MG 24 hr tablet Take 12.5 mg by mouth At Bedtime       nitroGLYcerin (NITROSTAT) 0.4 MG sublingual tablet For chest pain place 1 tablet under the tongue every 5 minutes for 3 doses. If symptoms persist 5 minutes after 1st dose call 911. 25 tablet 0     omeprazole (PRILOSEC) 40 MG DR capsule TAKE 1 CAPSULE(40 MG) BY MOUTH DAILY 30 TO 60 MINUTES BEFORE A MEAL 90 capsule 3      "order for DME Equipment being ordered: True Matrix Blood Glucose meter. 1 Act 11     polyethylene glycol (MIRALAX/GLYCOLAX) Packet Take 17 g by mouth daily       polyethylene glycol-propylene glycol (SYSTANE ULTRA) 0.4-0.3 % SOLN ophthalmic solution Place 2 drops into both eyes daily as needed for dry eyes       sennosides (SENOKOT) 8.6 MG tablet Take 2 tablets by mouth daily       tamsulosin (FLOMAX) 0.4 MG capsule Take 0.4 mg by mouth At Bedtime       ticagrelor (BRILINTA) 90 MG tablet Take 1 tablet (90 mg) by mouth 2 times daily       Medication Changes/Rationale:   -As noted above.     Controlled medications sent with patient:   not applicable/none     ROS:   Limited secondary to cognitive impairment but today 7 point ROS done including, light headedness/dizziness, fever/chills, pain, Resp, CV, GI, and  and is negative other than noted in HPI.      Physical Exam:   Vitals: /68   Pulse 65   Temp 97.3  F (36.3  C)   Resp 16   Ht 1.676 m (5' 6\")   Wt 71.3 kg (157 lb 3.2 oz)   SpO2 97%   BMI 25.37 kg/m     BMI= Body mass index is 25.37 kg/m .     GENERAL APPEARANCE:  Elderly male sitting up in recliner, NAD, non-toxic.  ENT:  Mouth and oropharynx normal, moist mucous membranes.   RESP:  Lungs CTA.  Regular relaxed breathing effort.  No cough.   CV: S1/S2 no murmur or rubs.  Regular rhythm and rate.  No generalized edema.   ABDOMEN:   Flat, soft, non-tender with active bowel sounds.  No guarding, rigidity, or rebound tenderness.  EXTREMITIES:  No lower extremity edema, no calf tenderness.   PSYCH: Alert to self, somewhat to situation, not to place, date.  Suspect some degree of cognitive/memory impairment.  Stable/unchanged.     SNF labs: Labs done in SNF are in [facility] [facility] [employer] Please refer to them using [employer]Care Everywhere.    DISCHARGE PLAN:    Follow up labs: No labs orders/due    Medical Follow Up:      Follow up with primary care provider in 1-2 weeks   Follow up with Neurology as instructed. "       MTM referral needed and placed by this provider: No    Current [facility] scheduled appointments:  Next 5 appointments (look out 90 days)    Sep 20, 2019  2:30 PM CDT  [facility]  [facility] [address] [facility] [address]  [phone]           Discharge Services: Home Care:  Occupational Therapy, Physical Therapy, Registered Nurse, Home Health Aide and From:  [facility]    Discharge Instructions Verbalized to Patient at Discharge:     Instructed patient to arrange/attend above appointments.     TOTAL DISCHARGE TIME:   Greater than 30 minutes  Electronically signed by:  ASPEN Silva CNP     Home care Face to Face documentation done in EPIC attached to Home care orders for [facility].               Sincerely,        ASPEN Silva CNP

## 2019-09-19 NOTE — TELEPHONE ENCOUNTER
"DULoxetine (CYMBALTA) 30 MG EC capsule 90 capsule 3 10/1/2018     Last Written Prescription Date:  10/1/2018  Last Fill Quantity: 90,  # refills: 3   Last office visit: 7/26/2019 with prescribing provider: PAULINE Morrissey    Future Office Visit:   Next 5 appointments (look out 90 days)    Sep 23, 2019  2:00 PM CDT  [facility] Visit with [facility] MD  [facility] [address] [facility] [address] [phone]         Requested Prescriptions   Pending Prescriptions Disp Refills     DULoxetine (CYMBALTA) 30 MG capsule [Pharmacy Med Name: DULOXETINE DR 30MG CAPSULES] 90 capsule 0     Sig: TAKE 1 CAPSULE(30 MG) BY MOUTH DAILY       Serotonin-Norepinephrine Reuptake Inhibitors  Passed - 9/19/2019  3:13 AM        Passed - Blood pressure under 140/90 in past 12 months     BP Readings from Last 3 Encounters:   09/13/19 111/68   09/09/19 135/62   09/06/19 134/74                 Passed - Recent (12 mo) or future (30 days) visit within the authorizing provider's specialty     Patient had office visit in the last 12 months or has a visit in the next 30 days with authorizing provider or within the authorizing provider's specialty.  See \"Patient Info\" tab in inbasket, or \"Choose Columns\" in Meds & Orders section of the refill encounter.              Passed - Medication is active on med list        Passed - Patient is age 18 or older          "

## 2019-09-20 NOTE — PROGRESS NOTES
Clinic Care Coordination Contact    Clinic Care Coordination Contact  OUTREACH    Referral Information:     Chief Complaint   Patient presents with     Clinic Care Coordination - Post Hospital     Clinic Care Coordination RN         [place] Utilization: [facility] hospitalization 8/24-8/29/2019-then transferred to [facility] TCU 8/29-9/14/2019  Discharge Diagnoses     Slurred speech suspected due to seizure  Chronic systolic CHF  Hx severe 3-vessel CAD s/p PCI  Hx PAD s/p right CEA  Hx chronic left ICA occlusion  DM II  HTN  Seizure disorder  GERD  BPH  Depression       Utilization    Last refreshed: 9/20/2019  9:35 AM:  Hospital Admissions 4           Last refreshed: 9/20/2019  9:35 AM:  ED Visits 0           Last refreshed: 9/20/2019  9:35 AM:  No Show Count (past year) 1              Current as of: 9/20/2019  9:35 AM          Clinical Concerns:  Current Medical Concerns:  CC spoke to patient and he is able to answer CC questions without difficulty and no slurred speech with our conversation.  Patient states he doesn't have home care services  Stating Halina /significant other refuses home care services deciding to care for the patient herself   Halina is not home now but patient will inform her CC called and will meet them at PCP visit 9/23/2019     Health Maintenance Reviewed:    Clinical Pathway: None    Medication Management:  Poor historian      Functional Status:Uses a walker           Goals:   Goals        General    #1 Monitoring (pt-stated)     Notes - Note edited  9/20/2019 11:16 AM by Farnaz Eddy, RN    Goal Statement: I will test blood sugars twice a day and keep the reading below 150.  I will check blood pressure daily and call if consistently over 140/90  Measure of Success: Blood sugar and blood pressure in the normal range   Supportive Steps to Achieve:   I will continue daily home exercises and use cane and walker as needed for safety  I will concentrate on eating 3 healthy  meals a day   Barriers: None  Strengths: Supportive friend that lives with him   Date to Achieve By:10/20/219  Patient expressed understanding of goal: Yes                Patient/Caregiver understanding: Discharge instructions are being reviewed by Halina significant other        Future Appointments              In 3 days Matt Morrissey MD [facility]           Plan: CC will meet the patient and significant other at PCP visit 9/23/2019    Farnaz Eddy RN, Care Coordinator   [facility] -Care Coordination  [facility] , [facility]   [phone]

## 2019-09-23 NOTE — LETTER
[place] [address] [facility]  [address]  [place]  [address]  Tel: [phone]    September 24, 2019    Dustin Pearce  [address] [facility] [address]        Dear Mr. Peacre,    The following letter pertains to your most recent diagnostic tests:     Good news! The hemoglobin A1c has improved and is now at goal.  Keep taking basaglar and metformin as you are.  Return in 6 months to recheck your hemoglobin A1c.  Follow up with neurology as next week as scheduled.         Sincerely,     Dr. Morrissey / fadumo Perkins Orders   Hemoglobin A1c   Result Value Ref Range    Hemoglobin A1C 7.6 (H) 0 - 5.6 %      Comment:      Normal <5.7% Prediabetes 5.7-6.4%  Diabetes 6.5% or higher - adopted from ADA   consensus guidelines.

## 2019-09-23 NOTE — PROGRESS NOTES
Subjective     Dustinvega Pearce is a 89 year old male who presents to clinic today for the following health issues:    HPI       Hospital Follow-up Visit:    Hospital/Nursing Home/IP Rehab Facility: [facility]   Date of Admission: 8/24/2019  Date of Discharge: 9/13/2019  Reason(s) for Admission:     Slurred speech suspected due to seizure  Chronic systolic CHF  Hx severe 3-vessel CAD s/p PCI  Hx PAD s/p right CEA  Hx chronic left ICA occlusion  DM II  HTN  Seizure disorder  GERD  BPH  Depression            Problems taking medications regularly:  None       Medication changes since discharge: None       Problems adhering to non-medication therapy:  None    Summary of hospitalization:  [facility] discharge summary reviewed  Diagnostic Tests/Treatments reviewed.  Follow up needed: neurology on 9/30  Other Healthcare Providers Involved in Patient s Care:         None  Update since discharge: improved.     Post Discharge Medication Reconciliation: discharge medications reconciled, continue medications without change.  Plan of care communicated with patient and family     Coding guidelines for this visit:  Type of Medical   Decision Making Face-to-Face Visit       within 7 Days of discharge Face-to-Face Visit        within 14 days of discharge   Moderate Complexity [id number]   High Complexity [id number]            Hospitalized for similar spell to previous spells  Keppra was increased by neurology  No subsequent spells   Plavix was changed to Brillinta based on blood tests   Went to TCU   He feels better now     Patient Active Problem List   Diagnosis     Coronary artery disease of native artery of native heart with stable angina pectoris (H)     Hyperlipidemia, unspecified hyperlipidemia type     Benign non-nodular prostatic hyperplasia with lower urinary tract symptoms     Gastroesophageal reflux disease without esophagitis     Cerebrovascular accident (H)     Carotid  artery stenosis     Abdominal aortic aneurysm (H)     Lumbar back pain     Benign essential hypertension     TIA (transient ischemic attack)     Paroxysmal atrial fibrillation (H)     Ischemic cardiomyopathy     Aortic root dilatation (H)     Physical deconditioning     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)     Anemia due to blood loss, acute     Diarrhea, unspecified type     Nausea and vomiting, intractability of vomiting not specified, unspecified vomiting type     Acute pain of left shoulder     Balance problems     Generalized muscle weakness     Peripheral artery disease (H)     Aortic stenosis     RBBB with left anterior fascicular block     Stroke of unknown etiology (H)     Carotid stenosis     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)     History of TIA (transient ischemic attack) and stroke     UTI (urinary tract infection)     UTI (urinary tract infection) due to Enterococcus     Generalized weakness     Type 2 diabetes mellitus with complication, with long-term current use of insulin (H)     Atherosclerosis of coronary artery of native heart without angina pectoris, unspecified vessel or lesion type     Depression, unspecified depression type     Slow transit constipation     Severe sepsis (H)     Chest discomfort     NSTEMI (non-ST elevated myocardial infarction) (H)     Anemia     Cognitive impairment     Acute on chronic systolic congestive heart failure (H)     Benign prostatic hyperplasia without lower urinary tract symptoms     Other seizures (H)     Expressive aphasia     Spells of speech arrest     Facial droop     Dysarthria     Spell of altered cognition     H/O TIA (transient ischemic attack) and stroke     Seizure disorder (H)     Occlusion of left internal carotid artery     History of CEA (carotid endarterectomy), Right     Chronic combined systolic and diastolic congestive heart failure (H)     Coronary artery disease involving native heart without  angina pectoris, unspecified vessel or lesion type, h/o MODESTA     Essential hypertension     Debility     Spell of change in speech     Past Surgical History:   Procedure Laterality Date     AMPUTATE FOOT Left 2017    Procedure: AMPUTATE FOOT;  Sun Add#3: partial left foot amputation - Sagital Saw - Mini C-Arm;  Surgeon: Moe Kirk DPM;  Location:  OR     CHOLECYSTECTOMY       ENDARTERECTOMY CAROTID Right 1/3/2018    Procedure: ENDARTERECTOMY CAROTID;  RIGHT CAROTID ENDARTERECTOMY WITH EEG;  Surgeon: Roland Rodriguez MD;  Location:  OR     ESOPHAGOSCOPY, GASTROSCOPY, DUODENOSCOPY (EGD), COMBINED N/A 2016    Procedure: COMBINED ESOPHAGOSCOPY, GASTROSCOPY, DUODENOSCOPY (EGD);  Surgeon: Nasim Louis MD;  Location:  GI     GENITOURINARY SURGERY      KIDNEY STONE REMOVAL X 4     HC UGI ENDOSCOPY W EUS N/A 2016    Procedure: COMBINED ENDOSCOPIC ULTRASOUND, ESOPHAGOSCOPY, GASTROSCOPY, DUODENOSCOPY (EGD);  Surgeon: Nasim Louis MD;  Location:  GI     HERNIA REPAIR       INCISION AND DRAINAGE FOOT, COMBINED Left 2017    Procedure: COMBINED INCISION AND DRAINAGE FOOT;  REVISIONAL LEFT FOOT INCISION AND DRAINAGE WITH POSSIBLE FLAP CLOSURE , ANTIBIOTIC SOLUTION ;  Surgeon: Nabil Ann DPM;  Location:  OR     LASER HOLMIUM LITHOTRIPSY URETER(S), INSERT STENT, COMBINED  3/2/2012    Procedure:COMBINED CYSTOSCOPY, URETEROSCOPY, LASER HOLMIUM LITHOTRIPSY URETER(S), INSERT STENT; CYSTOSCOPY, RIGHT RETROGRADES, RIGHT URETEROSCOPY, HOLMIUM LASER, RIGHT STENT PLACEMENT; Surgeon:ANJELICA LEÓN; Location: OR     ROTATOR CUFF REPAIR RT/LT         Social History     Tobacco Use     Smoking status: Former Smoker     Packs/day: 1.00     Years: 30.00     Pack years: 30.00     Types: Cigarettes     Start date:      Last attempt to quit: 1999     Years since quittin.7     Smokeless tobacco: Never Used   Substance Use Topics     Alcohol use: Not  Currently     Alcohol/week: 7.0 standard drinks     Types: 7 Standard drinks or equivalent per week     Comment: 1 drink per biweekly     Family History   Problem Relation Age of Onset     Breast Cancer Mother      Heart Failure Father 81         Current Outpatient Medications   Medication Sig Dispense Refill     acetaminophen (TYLENOL) 500 MG tablet Take 500-1,000 mg by mouth every 6 hours as needed for mild pain       aspirin EC 81 MG EC tablet Take 1 tablet (81 mg) by mouth daily 30 tablet 0     atorvastatin (LIPITOR) 20 MG tablet Take 2 tablets (40 mg) by mouth daily 30 tablet 0     bisacodyl (DULCOLAX) 10 MG Suppository Place 10 mg rectally daily as needed for constipation       blood glucose monitoring (NO BRAND SPECIFIED) meter device kit Use to test blood sugar bid times daily or as directed. 1 kit 0     blood glucose monitoring (TRUE METRIX BLOOD GLUCOSE TEST) test strip USE TO TEST BLOOD SUGAR THREE TIMES DAILY OR AS DIRECTED 300 strip 1     DULoxetine (CYMBALTA) 30 MG capsule TAKE 1 CAPSULE(30 MG) BY MOUTH DAILY 90 capsule 2     insulin glargine (BASAGLAR KWIKPEN) 100 UNIT/ML pen Inject 26 Units Subcutaneous At Bedtime 15 mL 11     ipratropium (ATROVENT) 0.03 % nasal spray INHALE 1 SPRAY IN EACH NOSTRIL EVERY 12 HOURS AS NEEDED 30 mL 11     levETIRAcetam (KEPPRA) 500 MG tablet Take 1 tablet (500 mg) by mouth 2 times daily       MAGNESIUM-OXIDE 400 (241.3 Mg) MG tablet TAKE 1 TABLET BY MOUTH TWICE DAILY 180 tablet 1     metFORMIN (GLUCOPHAGE) 1000 MG tablet Take 1 tablet (1,000 mg) by mouth 2 times daily (with meals) 180 tablet 3     metoprolol succinate ER (TOPROL-XL) 25 MG 24 hr tablet Take 12.5 mg by mouth At Bedtime       nitroGLYcerin (NITROSTAT) 0.4 MG sublingual tablet For chest pain place 1 tablet under the tongue every 5 minutes for 3 doses. If symptoms persist 5 minutes after 1st dose call 911. 25 tablet 0     omeprazole (PRILOSEC) 40 MG DR capsule TAKE 1 CAPSULE(40 MG) BY MOUTH DAILY 30 TO 60  "MINUTES BEFORE A MEAL 90 capsule 3     order for DME Equipment being ordered: True Matrix Blood Glucose meter. 1 Act 11     polyethylene glycol (MIRALAX/GLYCOLAX) Packet Take 17 g by mouth daily       polyethylene glycol-propylene glycol (SYSTANE ULTRA) 0.4-0.3 % SOLN ophthalmic solution Place 2 drops into both eyes daily as needed for dry eyes       sennosides (SENOKOT) 8.6 MG tablet Take 2 tablets by mouth daily       tamsulosin (FLOMAX) 0.4 MG capsule Take 0.4 mg by mouth At Bedtime       ticagrelor (BRILINTA) 90 MG tablet Take 1 tablet (90 mg) by mouth 2 times daily       Allergies   Allergen Reactions     Oxycodone Other (See Comments)     \"TERRIBLE SWEATING\"     Sulfa Drugs      Tetracycline        Reviewed and updated as needed this visit by Provider         Review of Systems   ROS COMP: Constitutional, HEENT, cardiovascular, pulmonary, gi and gu systems are negative, except as otherwise noted.      Objective    /56 (BP Location: Right arm, Cuff Size: Adult Regular)   Pulse 69   Temp 97.1  F (36.2  C) (Oral)   Ht 1.676 m (5' 6\")   Wt 72.1 kg (159 lb)   SpO2 97%   BMI 25.66 kg/m    Body mass index is 25.66 kg/m .  Physical Exam   GENERAL: healthy, alert and no distress  RESP: lungs clear to auscultation - no rales, rhonchi or wheezes  CV: Heart with regular rate and rhythm.   ABDOMEN: soft, nontender, no hepatosplenomegaly, no masses and bowel sounds normal  MS: no gross musculoskeletal defects noted, no edema  NEURO: Alert and oriented to person, place and time. Memory deficits unchanged.  Walks with cane.  No focal weakness.    PSYCH: unchanged mildly flat affect, well groomed     Diagnostic Test Results:  Labs reviewed in Epic        A1c pending     Assessment & Plan       ICD-10-CM    1. Spell of change in speech R47.89    2. 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 TIA/stroke, no stroke on CT  Keppra increased and Mattieta started  Follow up with " neurology next week  Blood pressure OK  Check A1c today                Return in about 3 months (around 12/23/2019) for Diabetes Check. or sooner as needed     Matt Morrissey MD  [facility]

## 2019-09-24 NOTE — RESULT ENCOUNTER NOTE
The following letter pertains to your most recent diagnostic tests:    Good news! The hemoglobin A1c has improved and is now at goal.  Keep taking basaglar and metformin as you are.  Return in 6 months to recheck your hemoglobin A1c.  Follow up with neurology as next week as scheduled.         Sincerely,    Dr. Morrissey

## 2019-10-02 NOTE — PROGRESS NOTES
Clinic Care Coordination Contact    Follow Up Progress Note     [facility] hospitalization 8/24-8/29/2019-then transferred to [facility] TCU 8/29-9/14/2019  Discharge Diagnoses     Slurred speech suspected due to seizure  Chronic systolic CHF  Hx severe 3-vessel CAD s/p PCI  Hx PAD s/p right CEA  Hx chronic left ICA occlusion  DM II  HTN  Seizure disorder  GERD  BPH  Depression        Assessment: Patient is a poor historian Halina /significant other is at work and aptient will have Halina call me when she can for an update      Goals addressed this encounter:   Goals Addressed                 This Visit's Progress      #1 Monitoring (pt-stated)   70%     Goal Statement: I will test blood sugars twice a day and keep the reading below 150.  I will check blood pressure daily and call if consistently over 140/90  Measure of Success: Blood sugar and blood pressure in the normal range   Supportive Steps to Achieve:   I will continue daily home exercises and use cane and walker as needed for safety  I will concentrate on eating 3 healthy meals a day   Barriers: None  Strengths: Supportive friend that lives with him   Date to Achieve By:11/1/2019  Patient expressed understanding of goal: Yes               Intervention/Education provided during outreach: CC reviewed the good news of improved HGB A1C      Outreach Frequency: 2 weeks    Plan:     Care Coordinator will await a return call from Halina/significant other     Farnaz Eddy RN, Care Coordinator   [facility] -Care Coordination  [facility] , [facility]   [phone]

## 2019-10-10 NOTE — TELEPHONE ENCOUNTER
Reason for Call:  Medication or medication refill:    Do you use a [facility]?  Name of the pharmacy and phone number for the current request:  [id number] [address] AT [facility] [address]      Name of the medication requested: ticagrelor (BRILINTA) 90 MG tablet [[id number]]       Other request: pt was prescribed this in the hospital and needs a refil    Can we leave a detailed message on this number? YES    Phone number patient can be reached at: Home number on file [phone] (home)    Best Time: any    Call taken on 10/10/2019 at 2:20 PM by Galina Rivas

## 2019-10-10 NOTE — TELEPHONE ENCOUNTER
Last OV 9/23/19    Routing refill request to provider for review/approval because:    Rx is historical on med list     Per Rx history filled 9/14/19 #60 (30 day) 90 mg tablets - 1 tab BID (Dr. Alexandre Petty)     Elda FERNÁNDEZ RN

## 2019-10-10 NOTE — PROGRESS NOTES
Clinic Care Coordination Contact    Follow Up Progress Note      Assessment: Patient reports his significant other Halina is at work and she is his main caregiver.  Patient will inform Halina to call CC with an update   Patient reports his blood pressures have been within the normal range .  Patient states he doesn't monitor his blood pressures at home   Goals addressed this encounter:   Goals Addressed                 This Visit's Progress      #1 Monitoring (pt-stated)   80%     Goal Statement: I will test blood sugars twice a day and keep the reading below 150.  I will check blood pressure daily and call if consistently over 140/90  Measure of Success: Blood sugar and blood pressure in the normal range in the next 3 months   Supportive Steps to Achieve:   I will continue daily home exercises and use cane and walker as needed for safety  I will concentrate on eating 3 healthy meals a day   Barriers: None  Strengths: Supportive friend that lives with him   Date to Achieve By:11/111/2019  Patient expressed understanding of goal: Yes               Intervention/Education provided during outreach: CC informed patient to continue daily blood sugar checks and he agrees with the plan      Outreach Frequency: 2 weeks    Plan:     Care Coordinator will await a return call from Halina /significant other     [facility]     Farnaz Eddy RN Care Coordinator   [facility] / [facility] -[facility]   Phone: [phone]  Email :  [email or web address]

## 2019-10-10 NOTE — PROGRESS NOTES
Clinic Care coordination RN :      CC spoke to Halina /significant other .  Halina reports the majority of the patients blood sugars have been below 150   Blood pressure readings all have been below 140/90    Goal Statement: I will test blood sugars twice a day and keep the reading below 150.  I will check blood pressure daily and call if consistently over 140/90  Measure of Success: Blood sugar and blood pressure in the normal range in the next 3 months   Supportive Steps to Achieve:   I will continue daily home exercises and use cane and walker as needed for safety  I will concentrate on eating 3 healthy meals a day   Barriers: None  Strengths: Supportive friend that lives with him   Date to Achieve By:11/111/2019  Patient expressed understanding of goal: Yes      Patient has meet the goals place and no further care coordination needed at this time  Patient and Halina will call CC with future questions or concerns  No unmet needs at this time .  Closed to care coordination     [facility]     Farnaz Eddy RN Care Coordinator   [facility] / [facility] -[facility]   Phone: [phone]  Email :  [email or web address]

## 2019-10-11 NOTE — TELEPHONE ENCOUNTER
"blood glucose monitoring (TRUE METRIX BLOOD GLUCOSE TEST) test strip 300 strip 1 6/6/2018         Last Written Prescription Date:  06/06/2018  Last Fill Quantity: 300,  # refills: 1   Last office visit: 9/23/2019 with prescribing provider:     Future Office Visit:   Next 5 appointments (look out 90 days)    Dec 19, 2019  6:00 PM CST  [facility]sey [facility]  [facility] [address] [facility] [id number] [phone]         Requested Prescriptions   Pending Prescriptions Disp Refills     TRUE METRIX BLOOD GLUCOSE TEST test strip [Pharmacy Med Name: TRUE METRIX BLOOD GLUCOSETEST STRP] 300 strip 0     Sig: USE TO TEST THREE TIMES DAILY OR AS DIRECTED       Diabetic Supplies Protocol Passed - 10/10/2019  9:30 PM        Passed - Medication is active on med list        Passed - Patient is 18 years of age or older        Passed - Recent (6 mo) or future (30 days) visit within the authorizing provider's specialty     Patient had office visit in the last 6 months or has a visit in the next 30 days with authorizing provider.  See \"Patient Info\" tab in inbasket, or \"Choose Columns\" in Meds & Orders section of the refill encounter.              "

## 2019-10-25 NOTE — ED NOTES
Critical Care Time (RN) Mins: 5982-0231   Detail Level: Generalized Instructions: This plan will send the code FBSE to the PM system.  DO NOT or CHANGE the price. Price (Do Not Change): 0.00

## 2019-11-18 NOTE — PROGRESS NOTES
[facility]    Dustin Pearce returns for vascular follow-up.  This active 89-year-old patient had a symptomatic right carotid stenosis.  His left ICA was chronically occluded.  Right bifurcation had a 50 to 60% calcified stenosis we felt that his TIA was related to this.  He underwent a right CEA on 1/3/2018 with no complications.    Patient is very frail.  He has had some falls recently with easy bruising.  He was switched to Brilinta when found to be Plavix resistant.      PMH: Medications: Keppra, Toprol-XL, Glucophage, aspirin,                                Flomax, Cymbalta, Brilinta                       Medical: Type 2 diabetes on medications-recent A1c= 7.6                            Cardiomyopathy with ejection fraction of 35%                               Right bundle branch block                               Paroxysmal atrial fibrillation                           Seizure disorder well-controlled medications                           BPH                          Known AAA measuring 3.3 x 3 cm               Non-smoker.  Still living independently with his wife.    Exam: Here with his wife.  In a wheelchair.  Frail but conversant.              Blood pressure 113/66.  Pulse 65 regular.              HEENT= well-healed right carotid incision.  Bilateral soft bruits.              Chest= clear to auscultation.  No rhonchi.              Cardiovascular= regular rate.               Abdomen= soft nontender.               Extremities= no edema.  No ulceration.                      Chronic callus left lateral foot well-healed.  Left fifth toe amputation                      Decreased 5.07 SW probe sensation bilaterally.                         No palpable pedal pulses.                   +1-2 monophasic right PT and AT signal.  +2-3 left AT biphasic                          Doppler         Right carotid duplex reveals a widely patent CEA with no evidence of stenosis.    Abdominal ultrasound  reveals a stable 2.98 x 3.01 infrarenal AAA.  Iliac arteries are normal.      Impression: #1.  Widely patent right CEA with known left ICA occlusion.  Plan repeat carotid duplex in 2 years.  This will depend on the patient's health.                            #2.  Stable small infrarenal AAA that is unchanged in size.  Reevaluate in 2 years pending patient's health.                          #3.  PAD with diabetic peripheral neuropathy.  We will have the callus taking care of by podiatry in early December which is a chronic appointment.  Has appropriate diabetic shoes.  Dry skin is noted more in the right and left distal foot and toes.  Wife applying appropriate lotion.  Aware the importance of excellent foot care with these issues.        I spent 25 minutes with the patient's wife with over 50% in counseling.      Roland Rodriguez MD       Patient Care Team:  Matt Morrissey MD as PCP - General (Internal Medicine)  Matt Morrissey MD as PCP - Internal Medicine (Internal Medicine)  [facility] ([facility]  [facility] ([facility] ([facility]  Matt Morrissey MD as Assigned PCP

## 2019-11-19 NOTE — NURSING NOTE
"Dustin Pearce is a 89 year old male who presents for:  Chief Complaint   Patient presents with     RECHECK     R Carotid (8:00 VHC, 9:45 WRO)  History of R carotid endarterectomy on 1/3/18, known L carotid occlusion & known 3.3cm AAA; 1 yr f/u to 10/11/18 appt with Dr. Rodriguez         Vitals:    Vitals:    11/19/19 0849   BP: 113/66   BP Location: Right arm   Patient Position: Chair   Cuff Size: Adult Regular   Pulse: 65       BMI:  Estimated body mass index is 25.66 kg/m  as calculated from the following:    Height as of 9/23/19: 5' 6\" (1.676 m).    Weight as of 9/23/19: 159 lb (72.1 kg).    Pain Score:  Data Unavailable        Alexa Hoffman MA    "

## 2019-11-19 NOTE — PATIENT INSTRUCTIONS

## 2019-11-23 NOTE — TELEPHONE ENCOUNTER
metFORMIN (GLUCOPHAGE) 1000 MG tablet  Last Written Prescription Date:  7/03/19  Last Fill Quantity: 180 tablet,  # refills: 3   Last office visit: 9/23/2019 with prescribing provider:  Yuni White Office Visit:     Ordered Status Priority Ordering User Department   07/01/19 (none) (none) Nils Ricks MD  OBSERVATION         clopidogrel (PLAVIX) 75 MG tablet  Last Written Prescription Date:  5/29/18  Last Fill Quantity: 90 tablet,  # refills: 1   Last office visit: 9/23/2019 with prescribing provider:  Yuni White Office Visit:     Routing refill request to provider for review/approval because:  Drug not active on patient's medication list    Discontinued 8/29/2019, Reason for Discontinue: Stop at Discharge    tamsulosin (FLOMAX) 0.4 MG capsule  Last Written Prescription Date:  ?  Last Fill Quantity: ?,  # refills: ?   Last office visit: 9/23/2019 with prescribing provider:  Yuni White Office Visit:   Next 5 appointments (look out 90 days)    Dec 19, 2019  6:00 PM CST  Office Visit with Matt Morrissey MD  [facility] [address] [address]  [phone]        Routing refill request to provider for review/approval because:  Medication is reported/historical    Ordered Status Priority Ordering User Department   04/08/19 (none) (none) [facility]         Requested Prescriptions   Pending Prescriptions Disp Refills     metFORMIN (GLUCOPHAGE) 1000 MG tablet [Pharmacy Med Name: METFORMIN 1000MG TABLETS] 180 tablet 0     Sig: TAKE 1 TABLET BY MOUTH TWICE DAILY WITH MEALS       Biguanide Agents Failed - 11/23/2019  3:13 AM        Failed - Patient does NOT have a diagnosis of CHF.        Passed - Blood pressure less than 140/90 in past 6 months     BP Readings from Last 3 Encounters:   11/19/19 113/66   09/23/19 103/56   09/13/19 111/68                 Passed - Patient has documented LDL within the past 12 mos.     Recent  "Labs   Lab Test 08/25/19  0620   LDL 49             Passed - Patient has had a Microalbumin in the past 15 mos.     Recent Labs   Lab Test 02/28/19  1913   MICROL 92   UMALCR 46.80*             Passed - Patient is age 10 or older        Passed - Patient has documented A1c within the specified period of time.     If HgbA1C is 8 or greater, it needs to be on file within the past 3 months.  If less than 8, must be on file within the past 6 months.     Recent Labs   Lab Test 09/23/19  1512   A1C 7.6*             Passed - Patient's CR is NOT>1.4 OR Patient's EGFR is NOT<45 within past 12 mos.     Recent Labs   Lab Test 08/28/19  0724   GFRESTIMATED 81   GFRESTBLACK >90       Recent Labs   Lab Test 08/28/19  0724   CR 0.72             Passed - Medication is active on med list        Passed - Recent (6 mo) or future (30 days) visit within the authorizing provider's specialty     Patient had office visit in the last 6 months or has a visit in the next 30 days with authorizing provider or within the authorizing provider's specialty.  See \"Patient Info\" tab in inbasket, or \"Choose Columns\" in Meds & Orders section of the refill encounter.            clopidogrel (PLAVIX) 75 MG tablet [Pharmacy Med Name: CLOPIDOGREL 75MG TABLETS] 90 tablet 0     Sig: TAKE 1 TABLET BY MOUTH DAILY       Plavix Failed - 11/23/2019  3:13 AM        Failed - No active PPI on record unless is Protonix        Failed - Normal HGB on file in past 12 months     Recent Labs   Lab Test 08/28/19  0724   HGB 11.0*               Failed - Normal Platelets on file in past 12 months     Recent Labs   Lab Test 08/28/19  0724   *               Failed - Medication is active on med list        Passed - Recent (12 mo) or future (30 days) visit within the authorizing provider's specialty     Patient has had an office visit with the authorizing provider or a provider within the authorizing providers department within the previous 12 mos or has a future within " "next 30 days. See \"Patient Info\" tab in inbasket, or \"Choose Columns\" in Meds & Orders section of the refill encounter.              Passed - Patient is age 18 or older        tamsulosin (FLOMAX) 0.4 MG capsule [Pharmacy Med Name: TAMSULOSIN 0.4MG CAPSULES] 90 capsule 0     Sig: TAKE 1 CAPSULE BY MOUTH DAILY       Alpha Blockers Passed - 11/23/2019  3:13 AM        Passed - Blood pressure under 140/90 in past 12 months     BP Readings from Last 3 Encounters:   11/19/19 113/66   09/23/19 103/56   09/13/19 111/68                 Passed - Recent (12 mo) or future (30 days) visit within the authorizing provider's specialty     Patient has had an office visit with the authorizing provider or a provider within the authorizing providers department within the previous 12 mos or has a future within next 30 days. See \"Patient Info\" tab in inbasket, or \"Choose Columns\" in Meds & Orders section of the refill encounter.              Passed - Patient does not have Tadalafil, Vardenafil, or Sildenafil on their medication list        Passed - Medication is active on med list        Passed - Patient is 18 years of age or older          "

## 2019-11-25 NOTE — TELEPHONE ENCOUNTER
Flomax Prescription approved per [facility] Refill Protocol.    Brent MINAYA RN

## 2019-12-31 NOTE — ED TRIAGE NOTES
"Pt arrives to ED for evaluation after having falls at home. Pt reportedly fell yesterday and told slide out of his chair onto the ground. Pt states he was able to remember the fall, but \"doesn't know why\" he fell. Per report pt has been feeling weak for the past couple days. Pt has bandage over left elbow covering a laceration.  "

## 2019-12-31 NOTE — ED AVS SNAPSHOT
Emergency Department  [address] [address]  Phone:  [phone]  Fax:  [phone]                                    Dustin Pearce   MRN: [id number]    Department:   Emergency Department   Date of Visit:  12/31/2019           After Visit Summary Signature Page    I have received my discharge instructions, and my questions have been answered. I have discussed any challenges I see with this plan with the nurse or doctor.    ..........................................................................................................................................  Patient/Patient Representative Signature      ..........................................................................................................................................  Patient Representative Print Name and Relationship to Patient    ..................................................               ................................................  Date                                   Time    ..........................................................................................................................................  Reviewed by Signature/Title    ...................................................              ..............................................  Date                                               Time          22EPIC Rev 08/18

## 2019-12-31 NOTE — ED PROVIDER NOTES
"  History     Chief Complaint:  Altered mental status     HPI   Dustin Pearce is a 89 year old male with a history of seizures on Keppra and TIAs on Brilinta, severe 3-vessel CAD s/p PCI, PAD s/p right CEA, left ICA occlusion, type II diabetes who presents with altered mental status. Patient's wife tried to call the patient from work and was only getting a busy line. She asked her neighbor Taran to check on him. Taran found the patient sitting in his chair not communicative and incoherent. He tried to help the patient up from the chair, but the patient slid down to the floor. EMS was called for transport to the ED. Here, the patient says he feels like \"horse shit,\" but because he is in the hospital. He denies any pains and is otherwise asymptomatic other than a chronic cough. He is at his mental baseline here in the ED per the patient's wife. He denies urinary symptoms, recent illness.     Apparently, the patient \"tripped over his feet\" yesterday while wife was at work, but was able to get up on his own power and only hurt his left arm with overlying skin tears. His last fall prior was one month ago. He usually ambulates independently with a walker. He has mild memory loss and does not have a caretaker, usually does well independently while wife is away.     A symptom of patient's post-ictal phase is altered communication. The patient has been compliant on his Keppra per wife and his last seizure was in August, see discharge summary item below dated 8/29/19:     Slurred speech, suspected seizure  Hx seizure disorder  Two recent presentations for expressive aphasia and dysarthria 7/1/19 and 7/16/19 with extensive work-up including head CT, MRI, EEG, negative TTE with bubble.  Keppra was increased prior admission, though no seizure activity witnessed or seen on EEG.  Presenting again with similar symptoms with CTA unchanged from prior, CT/CTP without acute pathology.  P2Y12 level suggests inadequate response to Plavix " "- switched to Brilinta 8/25.  He experienced recurrent symptoms following arrival with RRT's called 8/25 and 8/26.  Continuous EEG monitoring starting 8/26 through discharge remained negative for seizure activity, but there were also no recurrent events during this time.  Neurology suspects seizure as likely etiology of symptoms.  Keppra was decreased to 500 mg bid 8/27 due to fatigue.  Recommend close follow up with [facility] with consideration of ambulatory EEG monitoring.      Allergies:  Oxycodone   Sulfa drugs  Tetracycline      Medications:    Brilinta   Keppra  Baby aspirin  Amitriptyline  Lipitor  Cymbalta  Basaglar kwikpen  Atrovent  Metformin  Metoprolol  Prilosec  Flomax    Past Medical History:    Spells of speech arrest  Spell of change in speech  TIAs  Seizure disorder  Left ICA occlusion  Chronic combined systolic and diastolic CHF  CAD  Hypertension  NSTEMI   BPH   Type 2 diabetes  Depression  PAD  RBBB with left anterior fascicular block   Atrial fibrillation   Ischemic cardiomyopathy  Aortic root dilatation  Abdominal aortic aneurysm   Hyperlipidemia   GERD  Kidney stone    Past Surgical History:    Left foot amputation  Cholecystectomy  Right CEA   Kidney stone x 4  Herniorrhaphy  I & D left foot  Bilateral rotator cuff repair    Family History:    Breast cancer  Heart failure     Social History:  Smoking status: Former  Alcohol use: No  Drug use: NO  Patient presents with wife who feels \"frazzled.\"  PCP: Matt Morrissey    Marital Status:      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 Wife retiring on Friday   One son and step-daughter who live in [place].   Patient does not drive. No caretaker at home other than wife.     Review of Systems   Genitourinary: Negative for dysuria, frequency, hematuria and urgency.   Musculoskeletal: Positive for myalgias.   Skin: Positive for wound.   Neurological: Positive for seizures. "   Psychiatric/Behavioral: Positive for confusion.   All other systems reviewed and are negative.        Physical Exam     Patient Vitals for the past 24 hrs:   BP Temp Temp src Pulse Heart Rate Resp SpO2 Height Weight   12/31/19 1615 112/68 -- -- -- 72 -- 98 % -- --   12/31/19 1445 110/70 -- -- -- -- -- -- -- --   12/31/19 1400 117/50 -- -- 76 -- -- -- -- --   12/31/19 1345 120/45 -- -- 71 -- -- -- -- --   12/31/19 1330 115/49 -- -- 70 -- -- 97 % -- --   12/31/19 1311 -- 97.8  F (36.6  C) Oral -- -- -- -- -- --   12/31/19 1307 113/59 -- -- 74 -- 20 97 % 1.829 m (6') 76.2 kg (168 lb)      Physical Exam  Constitutional:  Oriented to person, place, and time.      Appears well-developed and well-nourished.   HENT:   Head:    Normocephalic and atraumatic.   Right Ear:   Tympanic membrane and external ear normal. Hearing aid.  Left Ear:   Tympanic membrane and external ear normal. Hearing aid.   Mouth/Throat:   Oropharynx is clear and moist and mucous membranes are normal.   Eyes:    Conjunctivae normal and EOM are normal.      Pupils are equal, round, and reactive to light.   Neck:    Normal range of motion. Neck supple.   Cardiovascular:  Normal rate, S1 normal, S2 normal and normal heart sounds.      No gallop. No murmur heard.  Pulmonary/Chest:  Effort normal and breath sounds normal.      No wheezes. No rhonchi. No rales.   Abdominal:   Soft. Normal appearance. No hepatosplenomegaly.      No tenderness. No rigidity, no rebound, no guarding.      No CVA tenderness.   Musculoskeletal:  Normal range of motion. Skin tears on the left elbow, but no bony tenderness. Multiple areas of ecchymosis bilateral arms.   Neurological:   Alert and oriented to person, place, and time.      Normal strength and normal reflexes.      No cranial nerve deficit or sensory deficit.      GCS eye subscore is 4. GCS verbal subscore is 5.      GCS motor subscore is 6. Finger to nose intact bilaterally.    Emergency Department Course     ECG  (13:48:26):  Rate 68 bpm. AL interval 170. QRS duration 146. QT/QTc 452/480. P-R-T axes 79 -87 62. Sinus rhythm with premature atrial complexes. RBBB. Left anterior fascicular block. Bifascicular block. No significant change when compared to EKG dated 8/24/19.  Interpreted at 1345 by Bernadette Walker MD.     Imaging:  Radiographic findings were communicated with the patient and family who voiced understanding of the findings.    CT-scan Head w/o contrast:  1. No acute traumatic intracranial abnormality.  2. Unchanged left frontal lobe encephalomalacia.  3. Moderate global volume loss and scattered cerebral white matter  hypoattenuation likely representing chronic small vessel ischemic  disease.  Result per radiology.      Laboratory:  CBC: WBC 6.5, HGB 11.6 (L),     BMP: Glucose 337 (H), o/w WNL (Creatinine 0.93)   1320 Troponin: <0.015     Emergency Department Course:  The patient arrived in the emergency department via EMS.      Past medical records, nursing notes, and vitals reviewed.  1333: I performed an exam of the patient and obtained history, as documented above. GCS 15.     IV inserted and blood drawn. This was sent to laboratory for testing, findings above.  The patient was sent for a head CT while in the emergency department, findings above.     He ambulated well here with a walker.    1557: I rechecked the patient. Findings and plan explained to the Patient and wife. Patient discharged home with instructions regarding supportive care, medications, and reasons to return. The importance of close follow-up was reviewed.      Impression & Plan      Medical Decision Making:  The patient is a 89-year-old male with history of multiple medical problems who comes in with 2 episodes over the last 2 days.  The first was that he said that he tripped and fell last night, was able to get up on his own, and got a skin tear on his arm.  Today, he did not answer the phone and neighbors found him with some  incoherent speech, but without any other neurologic symptoms.  The patient has mild dementia, but is able to give reasonable history.  In reviewing his charts, most recently in August they have thought that his speech changes have been due to seizure activity although this  Has not been documented on EEG, but has had multiple other negative studies.  He is on Keppra.  He appears back to baseline right now per wife.  In terms of injury, he had a head scan that was negative.  He has a skin tear in his left arm that has been cleaned and dressed.  I have discussed with him that it would be reasonable with 2 falls in 3 days to keep  Him a night  under observation.  The patient strongly wishes to go home and given his age and after discussion with his wife, they feel comfortable doing so.  I think his medical treatment has been maximized per neurology as he is on antiseizure medication and Brilinta for cerebrovascular disease.  They are advised that they can return anytime if they have concerns.  Of note, his blood sugar  was high here which his wife says often happens when he has these episodes.  She said his blood sugar was normal this morning.    Diagnosis:    ICD-10-CM    1. Fall, initial encounter W19.XXXA    2. Slurred speech R47.81    3. Skin tear of left upper arm without complication, initial encounter S41.112A        Disposition:  discharged to home with wife.     Shiv Craig  12/31/2019    EMERGENCY DEPARTMENT    Scribe Disclosure:  I, Shiv Craig, am serving as a scribe at 1:33 PM on 12/31/2019 to document services personally performed by Bernadette Walker MD based on my observations and the provider's statements to me.        Bernadette Walker MD  12/31/19 [id number]

## 2019-12-31 NOTE — ED NOTES
Bed: ED01  Expected date:   Expected time:   Means of arrival:   Comments:  wade 518 - 89M fall eta 0648

## 2020-01-01 ENCOUNTER — APPOINTMENT (OUTPATIENT)
Dept: CT IMAGING | Facility: CLINIC | Age: 85
End: 2020-01-01
Attending: EMERGENCY MEDICINE
Payer: MEDICARE

## 2020-01-01 ENCOUNTER — VIRTUAL VISIT (OUTPATIENT)
Dept: CARDIOLOGY | Facility: CLINIC | Age: 85
End: 2020-01-01
Attending: INTERNAL MEDICINE
Payer: MEDICARE

## 2020-01-01 ENCOUNTER — APPOINTMENT (OUTPATIENT)
Dept: PHYSICAL THERAPY | Facility: CLINIC | Age: 85
DRG: 101 | End: 2020-01-01
Attending: HOSPITALIST
Payer: MEDICARE

## 2020-01-01 ENCOUNTER — TELEPHONE (OUTPATIENT)
Dept: GERIATRICS | Facility: CLINIC | Age: 85
End: 2020-01-01

## 2020-01-01 ENCOUNTER — PATIENT OUTREACH (OUTPATIENT)
Dept: CARE COORDINATION | Facility: CLINIC | Age: 85
End: 2020-01-01

## 2020-01-01 ENCOUNTER — APPOINTMENT (OUTPATIENT)
Dept: CT IMAGING | Facility: CLINIC | Age: 85
DRG: 101 | End: 2020-01-01
Attending: NURSE PRACTITIONER
Payer: MEDICARE

## 2020-01-01 ENCOUNTER — APPOINTMENT (OUTPATIENT)
Dept: OCCUPATIONAL THERAPY | Facility: CLINIC | Age: 85
DRG: 101 | End: 2020-01-01
Payer: MEDICARE

## 2020-01-01 ENCOUNTER — NURSING HOME VISIT (OUTPATIENT)
Dept: GERIATRICS | Facility: CLINIC | Age: 85
End: 2020-01-01
Payer: MEDICARE

## 2020-01-01 ENCOUNTER — APPOINTMENT (OUTPATIENT)
Dept: GENERAL RADIOLOGY | Facility: CLINIC | Age: 85
DRG: 698 | End: 2020-01-01
Attending: EMERGENCY MEDICINE
Payer: MEDICARE

## 2020-01-01 ENCOUNTER — APPOINTMENT (OUTPATIENT)
Dept: SPEECH THERAPY | Facility: CLINIC | Age: 85
DRG: 101 | End: 2020-01-01
Payer: MEDICARE

## 2020-01-01 ENCOUNTER — TELEPHONE (OUTPATIENT)
Dept: FAMILY MEDICINE | Facility: CLINIC | Age: 85
End: 2020-01-01

## 2020-01-01 ENCOUNTER — HOSPITAL ENCOUNTER (INPATIENT)
Facility: CLINIC | Age: 85
LOS: 2 days | Discharge: NURSING, REHAB OR LONG-TERM CARE | DRG: 101 | End: 2020-04-21
Attending: EMERGENCY MEDICINE | Admitting: INTERNAL MEDICINE
Payer: MEDICARE

## 2020-01-01 ENCOUNTER — HOSPITAL ENCOUNTER (OUTPATIENT)
Facility: CLINIC | Age: 85
Setting detail: OBSERVATION
Discharge: NURSING, REHAB OR LONG-TERM CARE | End: 2020-06-12
Attending: EMERGENCY MEDICINE | Admitting: INTERNAL MEDICINE
Payer: MEDICARE

## 2020-01-01 ENCOUNTER — APPOINTMENT (OUTPATIENT)
Dept: PHYSICAL THERAPY | Facility: CLINIC | Age: 85
DRG: 101 | End: 2020-01-01
Payer: MEDICARE

## 2020-01-01 ENCOUNTER — APPOINTMENT (OUTPATIENT)
Dept: GENERAL RADIOLOGY | Facility: CLINIC | Age: 85
End: 2020-01-01
Attending: EMERGENCY MEDICINE
Payer: MEDICARE

## 2020-01-01 ENCOUNTER — APPOINTMENT (OUTPATIENT)
Dept: CT IMAGING | Facility: CLINIC | Age: 85
DRG: 872 | End: 2020-01-01
Attending: EMERGENCY MEDICINE
Payer: MEDICARE

## 2020-01-01 ENCOUNTER — DOCUMENTATION ONLY (OUTPATIENT)
Dept: FAMILY MEDICINE | Facility: CLINIC | Age: 85
End: 2020-01-01

## 2020-01-01 ENCOUNTER — APPOINTMENT (OUTPATIENT)
Dept: MRI IMAGING | Facility: CLINIC | Age: 85
DRG: 101 | End: 2020-01-01
Attending: PSYCHIATRY & NEUROLOGY
Payer: MEDICARE

## 2020-01-01 ENCOUNTER — VIRTUAL VISIT (OUTPATIENT)
Dept: GERIATRICS | Facility: CLINIC | Age: 85
End: 2020-01-01
Payer: MEDICARE

## 2020-01-01 ENCOUNTER — APPOINTMENT (OUTPATIENT)
Dept: PHYSICAL THERAPY | Facility: CLINIC | Age: 85
DRG: 948 | End: 2020-01-01
Attending: HOSPITALIST
Payer: MEDICARE

## 2020-01-01 ENCOUNTER — TRANSFERRED RECORDS (OUTPATIENT)
Dept: HEALTH INFORMATION MANAGEMENT | Facility: CLINIC | Age: 85
End: 2020-01-01

## 2020-01-01 ENCOUNTER — APPOINTMENT (OUTPATIENT)
Dept: CARDIOLOGY | Facility: CLINIC | Age: 85
DRG: 101 | End: 2020-01-01
Attending: HOSPITALIST
Payer: MEDICARE

## 2020-01-01 ENCOUNTER — APPOINTMENT (OUTPATIENT)
Dept: CT IMAGING | Facility: CLINIC | Age: 85
DRG: 101 | End: 2020-01-01
Attending: EMERGENCY MEDICINE
Payer: MEDICARE

## 2020-01-01 ENCOUNTER — APPOINTMENT (OUTPATIENT)
Dept: CT IMAGING | Facility: CLINIC | Age: 85
End: 2020-01-01
Attending: STUDENT IN AN ORGANIZED HEALTH CARE EDUCATION/TRAINING PROGRAM
Payer: MEDICARE

## 2020-01-01 ENCOUNTER — APPOINTMENT (OUTPATIENT)
Dept: SPEECH THERAPY | Facility: CLINIC | Age: 85
DRG: 698 | End: 2020-01-01
Payer: MEDICARE

## 2020-01-01 ENCOUNTER — APPOINTMENT (OUTPATIENT)
Dept: CT IMAGING | Facility: CLINIC | Age: 85
DRG: 948 | End: 2020-01-01
Attending: EMERGENCY MEDICINE
Payer: MEDICARE

## 2020-01-01 ENCOUNTER — APPOINTMENT (OUTPATIENT)
Dept: MRI IMAGING | Facility: CLINIC | Age: 85
DRG: 948 | End: 2020-01-01
Attending: PHYSICIAN ASSISTANT
Payer: MEDICARE

## 2020-01-01 ENCOUNTER — HOSPITAL ENCOUNTER (INPATIENT)
Facility: CLINIC | Age: 85
LOS: 3 days | Discharge: NURSING, REHAB OR LONG-TERM CARE | DRG: 948 | End: 2020-06-05
Attending: EMERGENCY MEDICINE | Admitting: HOSPITALIST
Payer: MEDICARE

## 2020-01-01 ENCOUNTER — HOSPITAL ENCOUNTER (INPATIENT)
Facility: CLINIC | Age: 85
LOS: 3 days | Discharge: NURSING, REHAB OR LONG-TERM CARE | DRG: 872 | End: 2020-05-04
Attending: EMERGENCY MEDICINE | Admitting: INTERNAL MEDICINE
Payer: MEDICARE

## 2020-01-01 ENCOUNTER — APPOINTMENT (OUTPATIENT)
Dept: GENERAL RADIOLOGY | Facility: CLINIC | Age: 85
End: 2020-01-01
Attending: INTERNAL MEDICINE
Payer: MEDICARE

## 2020-01-01 ENCOUNTER — APPOINTMENT (OUTPATIENT)
Dept: CT IMAGING | Facility: CLINIC | Age: 85
DRG: 698 | End: 2020-01-01
Attending: INTERNAL MEDICINE
Payer: MEDICARE

## 2020-01-01 ENCOUNTER — VIRTUAL VISIT (OUTPATIENT)
Dept: CARDIOLOGY | Facility: CLINIC | Age: 85
End: 2020-01-01
Payer: MEDICARE

## 2020-01-01 ENCOUNTER — APPOINTMENT (OUTPATIENT)
Dept: PHYSICAL THERAPY | Facility: CLINIC | Age: 85
DRG: 101 | End: 2020-01-01
Attending: INTERNAL MEDICINE
Payer: MEDICARE

## 2020-01-01 ENCOUNTER — APPOINTMENT (OUTPATIENT)
Dept: MRI IMAGING | Facility: CLINIC | Age: 85
End: 2020-01-01
Attending: INTERNAL MEDICINE
Payer: MEDICARE

## 2020-01-01 ENCOUNTER — APPOINTMENT (OUTPATIENT)
Dept: CT IMAGING | Facility: CLINIC | Age: 85
DRG: 698 | End: 2020-01-01
Attending: HOSPITALIST
Payer: MEDICARE

## 2020-01-01 ENCOUNTER — APPOINTMENT (OUTPATIENT)
Dept: CT IMAGING | Facility: CLINIC | Age: 85
DRG: 698 | End: 2020-01-01
Attending: EMERGENCY MEDICINE
Payer: MEDICARE

## 2020-01-01 ENCOUNTER — APPOINTMENT (OUTPATIENT)
Dept: SPEECH THERAPY | Facility: CLINIC | Age: 85
DRG: 698 | End: 2020-01-01
Attending: INTERNAL MEDICINE
Payer: MEDICARE

## 2020-01-01 ENCOUNTER — APPOINTMENT (OUTPATIENT)
Dept: OCCUPATIONAL THERAPY | Facility: CLINIC | Age: 85
DRG: 101 | End: 2020-01-01
Attending: INTERNAL MEDICINE
Payer: MEDICARE

## 2020-01-01 ENCOUNTER — HOSPITAL ENCOUNTER (EMERGENCY)
Facility: CLINIC | Age: 85
Discharge: HOME | DRG: 101 | End: 2020-03-05
Attending: EMERGENCY MEDICINE | Admitting: EMERGENCY MEDICINE
Payer: MEDICARE

## 2020-01-01 ENCOUNTER — APPOINTMENT (OUTPATIENT)
Dept: SPEECH THERAPY | Facility: CLINIC | Age: 85
DRG: 101 | End: 2020-01-01
Attending: INTERNAL MEDICINE
Payer: MEDICARE

## 2020-01-01 ENCOUNTER — APPOINTMENT (OUTPATIENT)
Dept: SPEECH THERAPY | Facility: CLINIC | Age: 85
DRG: 948 | End: 2020-01-01
Attending: PHYSICIAN ASSISTANT
Payer: MEDICARE

## 2020-01-01 ENCOUNTER — APPOINTMENT (OUTPATIENT)
Dept: CARDIOLOGY | Facility: CLINIC | Age: 85
DRG: 101 | End: 2020-01-01
Attending: INTERNAL MEDICINE
Payer: MEDICARE

## 2020-01-01 ENCOUNTER — HOSPITAL ENCOUNTER (OUTPATIENT)
Dept: NEUROLOGY | Facility: CLINIC | Age: 85
DRG: 948 | End: 2020-06-01
Attending: PSYCHIATRY & NEUROLOGY
Payer: MEDICARE

## 2020-01-01 ENCOUNTER — DOCUMENTATION ONLY (OUTPATIENT)
Dept: OTHER | Facility: CLINIC | Age: 85
End: 2020-01-01

## 2020-01-01 ENCOUNTER — APPOINTMENT (OUTPATIENT)
Dept: PHYSICAL THERAPY | Facility: CLINIC | Age: 85
End: 2020-01-01
Attending: INTERNAL MEDICINE
Payer: MEDICARE

## 2020-01-01 ENCOUNTER — HOSPITAL ENCOUNTER (INPATIENT)
Dept: CARDIOLOGY | Facility: CLINIC | Age: 85
DRG: 872 | End: 2020-05-04
Attending: INTERNAL MEDICINE
Payer: MEDICARE

## 2020-01-01 ENCOUNTER — HOSPITAL ENCOUNTER (OUTPATIENT)
Facility: CLINIC | Age: 85
Setting detail: OBSERVATION
Discharge: NURSING, REHAB OR LONG-TERM CARE | End: 2020-07-05
Attending: EMERGENCY MEDICINE | Admitting: INTERNAL MEDICINE
Payer: MEDICARE

## 2020-01-01 ENCOUNTER — APPOINTMENT (OUTPATIENT)
Dept: GENERAL RADIOLOGY | Facility: CLINIC | Age: 85
DRG: 698 | End: 2020-01-01
Attending: INTERNAL MEDICINE
Payer: MEDICARE

## 2020-01-01 ENCOUNTER — OFFICE VISIT (OUTPATIENT)
Dept: FAMILY MEDICINE | Facility: CLINIC | Age: 85
End: 2020-01-01
Payer: MEDICARE

## 2020-01-01 ENCOUNTER — HOSPITAL ENCOUNTER (INPATIENT)
Facility: CLINIC | Age: 85
LOS: 3 days | Discharge: NURSING, REHAB OR LONG-TERM CARE | DRG: 101 | End: 2020-03-09
Attending: EMERGENCY MEDICINE | Admitting: INTERNAL MEDICINE
Payer: MEDICARE

## 2020-01-01 ENCOUNTER — HOSPITAL ENCOUNTER (INPATIENT)
Facility: CLINIC | Age: 85
LOS: 7 days | DRG: 698 | End: 2020-08-11
Attending: EMERGENCY MEDICINE | Admitting: INTERNAL MEDICINE
Payer: MEDICARE

## 2020-01-01 ENCOUNTER — DISCHARGE SUMMARY NURSING HOME (OUTPATIENT)
Dept: GERIATRICS | Facility: CLINIC | Age: 85
End: 2020-01-01
Payer: MEDICARE

## 2020-01-01 ENCOUNTER — APPOINTMENT (OUTPATIENT)
Dept: PHYSICAL THERAPY | Facility: CLINIC | Age: 85
DRG: 698 | End: 2020-01-01
Attending: INTERNAL MEDICINE
Payer: MEDICARE

## 2020-01-01 ENCOUNTER — APPOINTMENT (OUTPATIENT)
Dept: MRI IMAGING | Facility: CLINIC | Age: 85
DRG: 101 | End: 2020-01-01
Attending: HOSPITALIST
Payer: MEDICARE

## 2020-01-01 ENCOUNTER — TELEPHONE (OUTPATIENT)
Dept: CARDIOLOGY | Facility: CLINIC | Age: 85
End: 2020-01-01

## 2020-01-01 ENCOUNTER — VIRTUAL VISIT (OUTPATIENT)
Dept: FAMILY MEDICINE | Facility: CLINIC | Age: 85
End: 2020-01-01
Payer: MEDICARE

## 2020-01-01 ENCOUNTER — APPOINTMENT (OUTPATIENT)
Dept: OCCUPATIONAL THERAPY | Facility: CLINIC | Age: 85
DRG: 101 | End: 2020-01-01
Attending: HOSPITALIST
Payer: MEDICARE

## 2020-01-01 ENCOUNTER — APPOINTMENT (OUTPATIENT)
Dept: GENERAL RADIOLOGY | Facility: CLINIC | Age: 85
DRG: 101 | End: 2020-01-01
Attending: EMERGENCY MEDICINE
Payer: MEDICARE

## 2020-01-01 VITALS
TEMPERATURE: 97 F | HEART RATE: 75 BPM | SYSTOLIC BLOOD PRESSURE: 123 MMHG | DIASTOLIC BLOOD PRESSURE: 67 MMHG | OXYGEN SATURATION: 98 % | HEIGHT: 65 IN | RESPIRATION RATE: 18 BRPM | WEIGHT: 150.8 LBS | BODY MASS INDEX: 25.12 KG/M2

## 2020-01-01 VITALS
RESPIRATION RATE: 20 BRPM | DIASTOLIC BLOOD PRESSURE: 63 MMHG | HEART RATE: 90 BPM | OXYGEN SATURATION: 98 % | HEIGHT: 67 IN | TEMPERATURE: 96.3 F | WEIGHT: 129.3 LBS | SYSTOLIC BLOOD PRESSURE: 106 MMHG | BODY MASS INDEX: 20.29 KG/M2

## 2020-01-01 VITALS
BODY MASS INDEX: 21.24 KG/M2 | DIASTOLIC BLOOD PRESSURE: 68 MMHG | SYSTOLIC BLOOD PRESSURE: 126 MMHG | HEIGHT: 72 IN | HEART RATE: 71 BPM | WEIGHT: 156.8 LBS | TEMPERATURE: 96.8 F | OXYGEN SATURATION: 99 %

## 2020-01-01 VITALS
HEIGHT: 67 IN | TEMPERATURE: 98 F | HEART RATE: 69 BPM | WEIGHT: 132.8 LBS | SYSTOLIC BLOOD PRESSURE: 134 MMHG | RESPIRATION RATE: 18 BRPM | DIASTOLIC BLOOD PRESSURE: 58 MMHG | BODY MASS INDEX: 20.84 KG/M2 | OXYGEN SATURATION: 99 %

## 2020-01-01 VITALS
TEMPERATURE: 97.2 F | SYSTOLIC BLOOD PRESSURE: 141 MMHG | OXYGEN SATURATION: 96 % | HEART RATE: 84 BPM | DIASTOLIC BLOOD PRESSURE: 74 MMHG | RESPIRATION RATE: 19 BRPM

## 2020-01-01 VITALS
HEART RATE: 104 BPM | RESPIRATION RATE: 16 BRPM | BODY MASS INDEX: 23.75 KG/M2 | SYSTOLIC BLOOD PRESSURE: 132 MMHG | TEMPERATURE: 97.5 F | WEIGHT: 147.8 LBS | DIASTOLIC BLOOD PRESSURE: 82 MMHG | OXYGEN SATURATION: 97 % | HEIGHT: 66 IN

## 2020-01-01 VITALS
TEMPERATURE: 97.5 F | DIASTOLIC BLOOD PRESSURE: 69 MMHG | BODY MASS INDEX: 20.3 KG/M2 | HEART RATE: 84 BPM | SYSTOLIC BLOOD PRESSURE: 143 MMHG | OXYGEN SATURATION: 98 % | RESPIRATION RATE: 18 BRPM | WEIGHT: 149.69 LBS

## 2020-01-01 VITALS
HEART RATE: 76 BPM | BODY MASS INDEX: 20.3 KG/M2 | WEIGHT: 149.69 LBS | RESPIRATION RATE: 16 BRPM | SYSTOLIC BLOOD PRESSURE: 154 MMHG | OXYGEN SATURATION: 97 % | DIASTOLIC BLOOD PRESSURE: 60 MMHG

## 2020-01-01 VITALS
OXYGEN SATURATION: 95 % | TEMPERATURE: 97.6 F | WEIGHT: 129.4 LBS | SYSTOLIC BLOOD PRESSURE: 124 MMHG | DIASTOLIC BLOOD PRESSURE: 51 MMHG | RESPIRATION RATE: 18 BRPM | BODY MASS INDEX: 21.53 KG/M2 | HEART RATE: 67 BPM

## 2020-01-01 VITALS
HEIGHT: 67 IN | DIASTOLIC BLOOD PRESSURE: 56 MMHG | WEIGHT: 124.1 LBS | SYSTOLIC BLOOD PRESSURE: 101 MMHG | BODY MASS INDEX: 19.48 KG/M2 | TEMPERATURE: 97.6 F | RESPIRATION RATE: 16 BRPM | HEART RATE: 96 BPM | OXYGEN SATURATION: 97 %

## 2020-01-01 VITALS
DIASTOLIC BLOOD PRESSURE: 77 MMHG | SYSTOLIC BLOOD PRESSURE: 156 MMHG | HEIGHT: 67 IN | RESPIRATION RATE: 24 BRPM | HEART RATE: 80 BPM | OXYGEN SATURATION: 97 % | BODY MASS INDEX: 20.4 KG/M2 | WEIGHT: 130 LBS | TEMPERATURE: 96.4 F

## 2020-01-01 VITALS
HEIGHT: 65 IN | BODY MASS INDEX: 22.61 KG/M2 | DIASTOLIC BLOOD PRESSURE: 58 MMHG | SYSTOLIC BLOOD PRESSURE: 125 MMHG | OXYGEN SATURATION: 96 % | TEMPERATURE: 98.6 F | WEIGHT: 135.7 LBS | RESPIRATION RATE: 15 BRPM | HEART RATE: 98 BPM

## 2020-01-01 VITALS
OXYGEN SATURATION: 97 % | RESPIRATION RATE: 16 BRPM | HEART RATE: 49 BPM | SYSTOLIC BLOOD PRESSURE: 116 MMHG | HEIGHT: 66 IN | TEMPERATURE: 97.3 F | DIASTOLIC BLOOD PRESSURE: 49 MMHG | WEIGHT: 150 LBS | BODY MASS INDEX: 24.11 KG/M2

## 2020-01-01 VITALS
HEART RATE: 73 BPM | DIASTOLIC BLOOD PRESSURE: 80 MMHG | RESPIRATION RATE: 18 BRPM | HEIGHT: 66 IN | SYSTOLIC BLOOD PRESSURE: 104 MMHG | TEMPERATURE: 98.1 F | WEIGHT: 145 LBS | BODY MASS INDEX: 23.3 KG/M2 | OXYGEN SATURATION: 97 %

## 2020-01-01 VITALS
BODY MASS INDEX: 20.56 KG/M2 | TEMPERATURE: 97.7 F | OXYGEN SATURATION: 97 % | RESPIRATION RATE: 19 BRPM | WEIGHT: 131 LBS | SYSTOLIC BLOOD PRESSURE: 124 MMHG | HEIGHT: 67 IN | DIASTOLIC BLOOD PRESSURE: 80 MMHG | HEART RATE: 82 BPM

## 2020-01-01 VITALS
HEIGHT: 67 IN | OXYGEN SATURATION: 100 % | BODY MASS INDEX: 19.27 KG/M2 | TEMPERATURE: 98.1 F | HEART RATE: 66 BPM | DIASTOLIC BLOOD PRESSURE: 73 MMHG | RESPIRATION RATE: 16 BRPM | SYSTOLIC BLOOD PRESSURE: 133 MMHG | WEIGHT: 122.8 LBS

## 2020-01-01 VITALS
DIASTOLIC BLOOD PRESSURE: 66 MMHG | RESPIRATION RATE: 18 BRPM | OXYGEN SATURATION: 95 % | SYSTOLIC BLOOD PRESSURE: 130 MMHG | TEMPERATURE: 97.6 F | HEART RATE: 90 BPM

## 2020-01-01 VITALS
SYSTOLIC BLOOD PRESSURE: 114 MMHG | HEIGHT: 67 IN | RESPIRATION RATE: 16 BRPM | OXYGEN SATURATION: 97 % | TEMPERATURE: 97.8 F | DIASTOLIC BLOOD PRESSURE: 64 MMHG | WEIGHT: 133.9 LBS | BODY MASS INDEX: 21.02 KG/M2 | HEART RATE: 72 BPM

## 2020-01-01 VITALS
BODY MASS INDEX: 20.37 KG/M2 | HEIGHT: 67 IN | SYSTOLIC BLOOD PRESSURE: 136 MMHG | HEART RATE: 79 BPM | WEIGHT: 129.8 LBS | RESPIRATION RATE: 20 BRPM | OXYGEN SATURATION: 94 % | DIASTOLIC BLOOD PRESSURE: 69 MMHG | TEMPERATURE: 97.6 F

## 2020-01-01 VITALS
RESPIRATION RATE: 16 BRPM | DIASTOLIC BLOOD PRESSURE: 60 MMHG | OXYGEN SATURATION: 97 % | HEIGHT: 65 IN | WEIGHT: 125.9 LBS | HEART RATE: 71 BPM | BODY MASS INDEX: 20.98 KG/M2 | TEMPERATURE: 97.6 F | SYSTOLIC BLOOD PRESSURE: 126 MMHG

## 2020-01-01 VITALS
WEIGHT: 128.2 LBS | TEMPERATURE: 97.6 F | HEIGHT: 67 IN | SYSTOLIC BLOOD PRESSURE: 142 MMHG | OXYGEN SATURATION: 97 % | BODY MASS INDEX: 20.12 KG/M2 | RESPIRATION RATE: 18 BRPM | DIASTOLIC BLOOD PRESSURE: 83 MMHG | HEART RATE: 68 BPM

## 2020-01-01 VITALS
DIASTOLIC BLOOD PRESSURE: 56 MMHG | RESPIRATION RATE: 16 BRPM | HEIGHT: 66 IN | HEART RATE: 56 BPM | OXYGEN SATURATION: 95 % | SYSTOLIC BLOOD PRESSURE: 128 MMHG | BODY MASS INDEX: 24.04 KG/M2 | TEMPERATURE: 97 F | WEIGHT: 149.6 LBS

## 2020-01-01 VITALS
WEIGHT: 155 LBS | RESPIRATION RATE: 16 BRPM | SYSTOLIC BLOOD PRESSURE: 118 MMHG | DIASTOLIC BLOOD PRESSURE: 61 MMHG | BODY MASS INDEX: 25.83 KG/M2 | HEIGHT: 65 IN | OXYGEN SATURATION: 96 % | HEART RATE: 67 BPM | TEMPERATURE: 97 F

## 2020-01-01 VITALS
SYSTOLIC BLOOD PRESSURE: 102 MMHG | OXYGEN SATURATION: 98 % | DIASTOLIC BLOOD PRESSURE: 58 MMHG | TEMPERATURE: 96.6 F | HEART RATE: 52 BPM | RESPIRATION RATE: 17 BRPM

## 2020-01-01 VITALS
TEMPERATURE: 98.1 F | DIASTOLIC BLOOD PRESSURE: 66 MMHG | HEART RATE: 95 BPM | RESPIRATION RATE: 22 BRPM | WEIGHT: 138.8 LBS | BODY MASS INDEX: 21.74 KG/M2 | OXYGEN SATURATION: 95 % | SYSTOLIC BLOOD PRESSURE: 127 MMHG

## 2020-01-01 VITALS
WEIGHT: 157 LBS | OXYGEN SATURATION: 92 % | BODY MASS INDEX: 21.26 KG/M2 | TEMPERATURE: 97.6 F | SYSTOLIC BLOOD PRESSURE: 92 MMHG | HEIGHT: 72 IN | DIASTOLIC BLOOD PRESSURE: 51 MMHG | HEART RATE: 86 BPM

## 2020-01-01 DIAGNOSIS — I65.22 ICAO (INTERNAL CAROTID ARTERY OCCLUSION), LEFT: ICD-10-CM

## 2020-01-01 DIAGNOSIS — Z86.73 HISTORY OF CVA (CEREBROVASCULAR ACCIDENT): Primary | ICD-10-CM

## 2020-01-01 DIAGNOSIS — G40.909 SEIZURE DISORDER (H): ICD-10-CM

## 2020-01-01 DIAGNOSIS — R41.89 COGNITIVE IMPAIRMENT: ICD-10-CM

## 2020-01-01 DIAGNOSIS — E11.42 DM TYPE 2 WITH DIABETIC PERIPHERAL NEUROPATHY (H): ICD-10-CM

## 2020-01-01 DIAGNOSIS — K21.9 GASTROESOPHAGEAL REFLUX DISEASE WITHOUT ESOPHAGITIS: ICD-10-CM

## 2020-01-01 DIAGNOSIS — I48.0 PAF (PAROXYSMAL ATRIAL FIBRILLATION) (H): ICD-10-CM

## 2020-01-01 DIAGNOSIS — E11.8 TYPE 2 DIABETES MELLITUS WITH COMPLICATION, WITH LONG-TERM CURRENT USE OF INSULIN (H): ICD-10-CM

## 2020-01-01 DIAGNOSIS — S42.92XD TRAUMATIC CLOSED DISPLACED FRACTURE OF LEFT SHOULDER WITH ANTERIOR DISLOCATION WITH ROUTINE HEALING, SUBSEQUENT ENCOUNTER: ICD-10-CM

## 2020-01-01 DIAGNOSIS — H54.7 COMBINED VISUAL AND HEARING IMPAIRMENT: ICD-10-CM

## 2020-01-01 DIAGNOSIS — Z97.8 FOLEY CATHETER IN PLACE: ICD-10-CM
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
## 2020-01-01 DIAGNOSIS — F01.50 VASCULAR DEMENTIA WITHOUT BEHAVIORAL DISTURBANCE (H): ICD-10-CM

## 2020-01-01 DIAGNOSIS — N40.0 BENIGN PROSTATIC HYPERPLASIA WITHOUT LOWER URINARY TRACT SYMPTOMS: ICD-10-CM

## 2020-01-01 DIAGNOSIS — R00.1 SINUS BRADYCARDIA: ICD-10-CM

## 2020-01-01 DIAGNOSIS — I10 ESSENTIAL HYPERTENSION: ICD-10-CM

## 2020-01-01 DIAGNOSIS — F32.A DEPRESSION, UNSPECIFIED DEPRESSION TYPE: ICD-10-CM

## 2020-01-01 DIAGNOSIS — I35.0 AORTIC VALVE STENOSIS, ETIOLOGY OF CARDIAC VALVE DISEASE UNSPECIFIED: ICD-10-CM

## 2020-01-01 DIAGNOSIS — G45.9 TIA (TRANSIENT ISCHEMIC ATTACK): Primary | ICD-10-CM

## 2020-01-01 DIAGNOSIS — F32.A ANXIETY AND DEPRESSION: ICD-10-CM

## 2020-01-01 DIAGNOSIS — Z86.73 HISTORY OF CVA (CEREBROVASCULAR ACCIDENT): ICD-10-CM

## 2020-01-01 DIAGNOSIS — Z20.822 SUSPECTED COVID-19 VIRUS INFECTION: ICD-10-CM

## 2020-01-01 DIAGNOSIS — R53.81 PHYSICAL DECONDITIONING: ICD-10-CM

## 2020-01-01 DIAGNOSIS — E11.65 TYPE 2 DIABETES MELLITUS WITH HYPERGLYCEMIA, WITH LONG-TERM CURRENT USE OF INSULIN (H): ICD-10-CM

## 2020-01-01 DIAGNOSIS — R56.9 SEIZURE (H): ICD-10-CM

## 2020-01-01 DIAGNOSIS — Z79.4 TYPE 2 DIABETES MELLITUS WITH COMPLICATION, WITH LONG-TERM CURRENT USE OF INSULIN (H): ICD-10-CM

## 2020-01-01 DIAGNOSIS — D64.9 CHRONIC ANEMIA: ICD-10-CM
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
## 2020-01-01 DIAGNOSIS — I73.9 PAD (PERIPHERAL ARTERY DISEASE) (H): ICD-10-CM

## 2020-01-01 DIAGNOSIS — M62.81 GENERALIZED MUSCLE WEAKNESS: Primary | ICD-10-CM

## 2020-01-01 DIAGNOSIS — I95.9 HYPOTENSION, UNSPECIFIED HYPOTENSION TYPE: Primary | ICD-10-CM

## 2020-01-01 DIAGNOSIS — R53.81 DEBILITY: ICD-10-CM

## 2020-01-01 DIAGNOSIS — K57.32 SIGMOID DIVERTICULITIS: ICD-10-CM

## 2020-01-01 DIAGNOSIS — E11.42 DM TYPE 2 WITH DIABETIC PERIPHERAL NEUROPATHY (H): Primary | ICD-10-CM

## 2020-01-01 DIAGNOSIS — R09.89 RUNNY NOSE: ICD-10-CM

## 2020-01-01 DIAGNOSIS — D72.825 BANDEMIA: ICD-10-CM

## 2020-01-01 DIAGNOSIS — I25.118 CORONARY ARTERY DISEASE OF NATIVE ARTERY OF NATIVE HEART WITH STABLE ANGINA PECTORIS (H): ICD-10-CM

## 2020-01-01 DIAGNOSIS — R11.0 NAUSEA: ICD-10-CM

## 2020-01-01 DIAGNOSIS — R13.10 DYSPHAGIA, UNSPECIFIED TYPE: ICD-10-CM

## 2020-01-01 DIAGNOSIS — R29.818 TRANSIENT NEUROLOGIC DEFICIT: ICD-10-CM

## 2020-01-01 DIAGNOSIS — I50.22 CHRONIC SYSTOLIC HEART FAILURE (H): ICD-10-CM

## 2020-01-01 DIAGNOSIS — S43.005D DISLOCATION OF LEFT SHOULDER JOINT, SUBSEQUENT ENCOUNTER: Primary | ICD-10-CM

## 2020-01-01 DIAGNOSIS — S42.92XD TRAUMATIC CLOSED DISPLACED FRACTURE OF LEFT SHOULDER WITH ANTERIOR DISLOCATION WITH ROUTINE HEALING, SUBSEQUENT ENCOUNTER: Primary | ICD-10-CM

## 2020-01-01 DIAGNOSIS — I25.5 ISCHEMIC CARDIOMYOPATHY: ICD-10-CM

## 2020-01-01 DIAGNOSIS — R33.9 URINARY RETENTION: ICD-10-CM

## 2020-01-01 DIAGNOSIS — I95.1 ORTHOSTATIC HYPOTENSION: ICD-10-CM

## 2020-01-01 DIAGNOSIS — Z73.82 COMBINED VISUAL AND HEARING IMPAIRMENT: ICD-10-CM

## 2020-01-01 DIAGNOSIS — D64.9 ANEMIA, UNSPECIFIED TYPE: ICD-10-CM

## 2020-01-01 DIAGNOSIS — K57.92 DIVERTICULITIS: Primary | ICD-10-CM
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
## 2020-01-01 DIAGNOSIS — I25.10 CORONARY ARTERY DISEASE INVOLVING NATIVE CORONARY ARTERY OF NATIVE HEART WITHOUT ANGINA PECTORIS: ICD-10-CM

## 2020-01-01 DIAGNOSIS — Z71.89 ADVANCED DIRECTIVES, COUNSELING/DISCUSSION: ICD-10-CM

## 2020-01-01 DIAGNOSIS — R65.20 SEVERE SEPSIS (H): ICD-10-CM

## 2020-01-01 DIAGNOSIS — S51.012D SKIN TEAR OF LEFT ELBOW WITHOUT COMPLICATION, SUBSEQUENT ENCOUNTER: ICD-10-CM

## 2020-01-01 DIAGNOSIS — N17.9 AKI (ACUTE KIDNEY INJURY) (H): ICD-10-CM

## 2020-01-01 DIAGNOSIS — K29.00 OTHER ACUTE GASTRITIS WITHOUT HEMORRHAGE: ICD-10-CM

## 2020-01-01 DIAGNOSIS — H91.90 COMBINED VISUAL AND HEARING IMPAIRMENT: ICD-10-CM

## 2020-01-01 DIAGNOSIS — Z79.4 TYPE 2 DIABETES MELLITUS WITH OTHER CIRCULATORY COMPLICATION, WITH LONG-TERM CURRENT USE OF INSULIN (H): ICD-10-CM

## 2020-01-01 DIAGNOSIS — E87.1 HYPONATREMIA: ICD-10-CM

## 2020-01-01 DIAGNOSIS — S43.005A CLOSED DISLOCATION OF LEFT SHOULDER, INITIAL ENCOUNTER: Primary | ICD-10-CM

## 2020-01-01 DIAGNOSIS — R41.82 ALTERED MENTAL STATUS: ICD-10-CM

## 2020-01-01 DIAGNOSIS — E16.2 HYPOGLYCEMIA: ICD-10-CM

## 2020-01-01 DIAGNOSIS — G45.9 TRANSIENT CEREBRAL ISCHEMIA, UNSPECIFIED TYPE: ICD-10-CM

## 2020-01-01 DIAGNOSIS — R29.90 EPISODE OF TRANSIENT NEUROLOGIC SYMPTOMS: Primary | ICD-10-CM

## 2020-01-01 DIAGNOSIS — R54 FRAILTY: ICD-10-CM

## 2020-01-01 DIAGNOSIS — R13.11 ORAL PHASE DYSPHAGIA: ICD-10-CM

## 2020-01-01 DIAGNOSIS — R41.82 ALTERED MENTAL STATUS, UNSPECIFIED ALTERED MENTAL STATUS TYPE: ICD-10-CM

## 2020-01-01 DIAGNOSIS — R47.01 APHASIA: ICD-10-CM

## 2020-01-01 DIAGNOSIS — Z79.4 TYPE 2 DIABETES MELLITUS WITH HYPERGLYCEMIA, WITH LONG-TERM CURRENT USE OF INSULIN (H): ICD-10-CM

## 2020-01-01 DIAGNOSIS — I10 HYPERTENSION, UNSPECIFIED TYPE: ICD-10-CM

## 2020-01-01 DIAGNOSIS — I34.0 MITRAL VALVE INSUFFICIENCY, UNSPECIFIED ETIOLOGY: ICD-10-CM

## 2020-01-01 DIAGNOSIS — R42 DIZZINESS: ICD-10-CM

## 2020-01-01 DIAGNOSIS — R56.9 SEIZURE (H): Primary | ICD-10-CM

## 2020-01-01 DIAGNOSIS — N40.1 BENIGN NON-NODULAR PROSTATIC HYPERPLASIA WITH LOWER URINARY TRACT SYMPTOMS: ICD-10-CM

## 2020-01-01 DIAGNOSIS — I63.231 STENOSIS OF RIGHT INTERNAL CAROTID ARTERY WITH CEREBRAL INFARCTION (H): ICD-10-CM

## 2020-01-01 DIAGNOSIS — Z86.73 HISTORY OF TIA (TRANSIENT ISCHEMIC ATTACK) AND STROKE: ICD-10-CM

## 2020-01-01 DIAGNOSIS — A41.9 SEVERE SEPSIS (H): ICD-10-CM

## 2020-01-01 DIAGNOSIS — K59.00 CONSTIPATION, UNSPECIFIED CONSTIPATION TYPE: Primary | ICD-10-CM

## 2020-01-01 DIAGNOSIS — I10 BENIGN ESSENTIAL HYPERTENSION: ICD-10-CM

## 2020-01-01 DIAGNOSIS — R47.89 SPELL OF CHANGE IN SPEECH: ICD-10-CM

## 2020-01-01 DIAGNOSIS — R55 SYNCOPE, UNSPECIFIED SYNCOPE TYPE: ICD-10-CM

## 2020-01-01 DIAGNOSIS — R47.89 WORD FINDING DIFFICULTY: ICD-10-CM

## 2020-01-01 DIAGNOSIS — H35.30 MACULAR DEGENERATION OF LEFT EYE, UNSPECIFIED TYPE: ICD-10-CM

## 2020-01-01 DIAGNOSIS — G40.909 SEIZURE DISORDER (H): Primary | ICD-10-CM

## 2020-01-01 DIAGNOSIS — R53.1 WEAKNESS: ICD-10-CM

## 2020-01-01 DIAGNOSIS — R54 FRAILTY: Primary | ICD-10-CM

## 2020-01-01 DIAGNOSIS — F41.9 ANXIETY AND DEPRESSION: ICD-10-CM

## 2020-01-01 DIAGNOSIS — R29.6 FALLS FREQUENTLY: ICD-10-CM

## 2020-01-01 DIAGNOSIS — G40.909 RECURRENT SEIZURES (H): ICD-10-CM

## 2020-01-01 DIAGNOSIS — K57.92 DIVERTICULITIS: ICD-10-CM

## 2020-01-01 DIAGNOSIS — I25.118 CORONARY ARTERY DISEASE OF NATIVE ARTERY OF NATIVE HEART WITH STABLE ANGINA PECTORIS (H): Primary | ICD-10-CM

## 2020-01-01 DIAGNOSIS — K59.01 SLOW TRANSIT CONSTIPATION: ICD-10-CM

## 2020-01-01 DIAGNOSIS — E78.5 DYSLIPIDEMIA: ICD-10-CM

## 2020-01-01 DIAGNOSIS — E11.59 TYPE 2 DIABETES MELLITUS WITH OTHER CIRCULATORY COMPLICATION, WITH LONG-TERM CURRENT USE OF INSULIN (H): ICD-10-CM

## 2020-01-01 DIAGNOSIS — G63 POLYNEUROPATHY ASSOCIATED WITH UNDERLYING DISEASE (H): ICD-10-CM

## 2020-01-01 DIAGNOSIS — E78.5 HYPERLIPIDEMIA LDL GOAL <70: ICD-10-CM

## 2020-01-01 DIAGNOSIS — K21.00 GASTROESOPHAGEAL REFLUX DISEASE WITH ESOPHAGITIS: ICD-10-CM

## 2020-01-01 DIAGNOSIS — G45.9 TIA (TRANSIENT ISCHEMIC ATTACK): ICD-10-CM

## 2020-01-01 DIAGNOSIS — S43.005A SHOULDER DISLOCATION, LEFT, INITIAL ENCOUNTER: ICD-10-CM

## 2020-01-01 DIAGNOSIS — K59.00 CONSTIPATION, UNSPECIFIED CONSTIPATION TYPE: ICD-10-CM

## 2020-01-01 DIAGNOSIS — A41.9 SEPSIS (H): ICD-10-CM
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
## 2020-01-01 DIAGNOSIS — R29.90 EPISODE OF TRANSIENT NEUROLOGIC SYMPTOMS: ICD-10-CM

## 2020-01-01 DIAGNOSIS — R42 DIZZINESS: Primary | ICD-10-CM

## 2020-01-01 LAB
ALBUMIN SERPL-MCNC: 2.8 G/DL (ref 3.4–5)
ALBUMIN SERPL-MCNC: 3.3 G/DL (ref 3.4–5)
ALBUMIN SERPL-MCNC: 3.4 G/DL (ref 3.4–5)
ALBUMIN SERPL-MCNC: 3.5 G/DL (ref 3.4–5)
ALBUMIN UR-MCNC: 10 MG/DL
ALBUMIN UR-MCNC: 100 MG/DL
ALBUMIN UR-MCNC: 30 MG/DL
ALP SERPL-CCNC: 125 U/L (ref 40–150)
ALP SERPL-CCNC: 70 U/L (ref 40–150)
ALP SERPL-CCNC: 73 U/L (ref 40–150)
ALP SERPL-CCNC: 81 U/L (ref 40–150)
ALP SERPL-CCNC: 95 U/L (ref 40–150)
ALP SERPL-CCNC: 97 U/L (ref 40–150)
ALT SERPL W P-5'-P-CCNC: 18 U/L (ref 0–70)
ALT SERPL W P-5'-P-CCNC: 19 U/L (ref 0–70)
ALT SERPL W P-5'-P-CCNC: 22 U/L (ref 0–70)
ALT SERPL W P-5'-P-CCNC: 22 U/L (ref 0–70)
ALT SERPL W P-5'-P-CCNC: 28 U/L (ref 0–70)
ALT SERPL W P-5'-P-CCNC: 35 U/L (ref 0–70)
ANION GAP SERPL CALCULATED.3IONS-SCNC: 3 MMOL/L (ref 3–14)
ANION GAP SERPL CALCULATED.3IONS-SCNC: 4 MMOL/L (ref 3–14)
ANION GAP SERPL CALCULATED.3IONS-SCNC: 5 MMOL/L (ref 3–14)
ANION GAP SERPL CALCULATED.3IONS-SCNC: 6 MMOL/L (ref 3–14)
ANION GAP SERPL CALCULATED.3IONS-SCNC: 7 MMOL/L (ref 3–14)
ANION GAP SERPL CALCULATED.3IONS-SCNC: 7 MMOL/L (ref 7–16)
ANION GAP SERPL CALCULATED.3IONS-SCNC: 8 MMOL/L (ref 3–14)
ANION GAP SERPL CALCULATED.3IONS-SCNC: 9 MMOL/L (ref 3–14)
APPEARANCE UR: ABNORMAL
APPEARANCE UR: CLEAR
APTT PPP: 28 SEC (ref 22–37)
APTT PPP: 30 SEC (ref 22–37)
AST SERPL W P-5'-P-CCNC: 12 U/L (ref 0–45)
AST SERPL W P-5'-P-CCNC: 15 U/L (ref 0–45)
AST SERPL W P-5'-P-CCNC: 21 U/L (ref 0–45)
AST SERPL W P-5'-P-CCNC: 25 U/L (ref 0–45)
AST SERPL W P-5'-P-CCNC: 26 U/L (ref 0–45)
AST SERPL W P-5'-P-CCNC: 36 U/L (ref 0–45)
BACTERIA #/AREA URNS HPF: ABNORMAL /HPF
BACTERIA SPEC CULT: ABNORMAL
BACTERIA SPEC CULT: NO GROWTH
BASE EXCESS BLDV CALC-SCNC: 1.4 MMOL/L
BASOPHILS # BLD AUTO: 0 10E9/L (ref 0–0.2)
BASOPHILS NFR BLD AUTO: 0.1 %
BASOPHILS NFR BLD AUTO: 0.2 %
BASOPHILS NFR BLD AUTO: 0.2 %
BASOPHILS NFR BLD AUTO: 0.3 %
BILIRUB DIRECT SERPL-MCNC: 0.1 MG/DL (ref 0–0.2)
BILIRUB DIRECT SERPL-MCNC: <0.1 MG/DL (ref 0–0.2)
BILIRUB SERPL-MCNC: 0.3 MG/DL (ref 0.2–1.3)
BILIRUB SERPL-MCNC: 0.4 MG/DL (ref 0.2–1.3)
BILIRUB SERPL-MCNC: 0.6 MG/DL (ref 0.2–1.3)
BILIRUB SERPL-MCNC: 0.9 MG/DL (ref 0.2–1.3)
BILIRUB UR QL STRIP: NEGATIVE
BUN SERPL-MCNC: 12 MG/DL (ref 7–26)
BUN SERPL-MCNC: 12 MG/DL (ref 7–30)
BUN SERPL-MCNC: 13 MG/DL (ref 7–30)
BUN SERPL-MCNC: 13 MG/DL (ref 7–30)
BUN SERPL-MCNC: 14 MG/DL (ref 7–30)
BUN SERPL-MCNC: 15 MG/DL (ref 7–30)
BUN SERPL-MCNC: 16 MG/DL (ref 7–30)
BUN SERPL-MCNC: 17 MG/DL (ref 7–30)
BUN SERPL-MCNC: 19 MG/DL (ref 7–30)
BUN SERPL-MCNC: 19 MG/DL (ref 7–30)
BUN SERPL-MCNC: 20 MG/DL (ref 7–30)
BUN SERPL-MCNC: 20 MG/DL (ref 7–30)
BUN SERPL-MCNC: 21 MG/DL (ref 7–30)
BUN SERPL-MCNC: 22 MG/DL (ref 7–30)
BUN SERPL-MCNC: 23 MG/DL (ref 7–30)
BUN SERPL-MCNC: 6 MG/DL (ref 7–30)
BUN SERPL-MCNC: 7 MG/DL (ref 7–30)
CALCIUM SERPL-MCNC: 7.7 MG/DL (ref 8.5–10.1)
CALCIUM SERPL-MCNC: 8.1 MG/DL (ref 8.5–10.1)
CALCIUM SERPL-MCNC: 8.2 MG/DL (ref 8.5–10.1)
CALCIUM SERPL-MCNC: 8.3 MG/DL (ref 8.5–10.1)
CALCIUM SERPL-MCNC: 8.4 MG/DL (ref 8.5–10.1)
CALCIUM SERPL-MCNC: 8.5 MG/DL (ref 8.5–10.1)
CALCIUM SERPL-MCNC: 8.6 MG/DL (ref 8.5–10.1)
CALCIUM SERPL-MCNC: 8.8 MG/DL (ref 8.4–10.4)
CALCIUM SERPL-MCNC: 8.8 MG/DL (ref 8.5–10.1)
CALCIUM SERPL-MCNC: 9.1 MG/DL (ref 8.5–10.1)
CALCIUM SERPL-MCNC: 9.3 MG/DL (ref 8.5–10.1)
CALCIUM SERPL-MCNC: 9.3 MG/DL (ref 8.5–10.1)
CALCIUM SERPL-MCNC: 9.6 MG/DL (ref 8.5–10.1)
CHLORIDE SERPL-SCNC: 101 MMOL/L (ref 94–109)
CHLORIDE SERPL-SCNC: 102 MMOL/L (ref 94–109)
CHLORIDE SERPL-SCNC: 104 MMOL/L (ref 94–109)
CHLORIDE SERPL-SCNC: 105 MMOL/L (ref 94–109)
CHLORIDE SERPL-SCNC: 105 MMOL/L (ref 94–109)
CHLORIDE SERPL-SCNC: 106 MMOL/L (ref 94–109)
CHLORIDE SERPL-SCNC: 106 MMOL/L (ref 94–109)
CHLORIDE SERPL-SCNC: 107 MMOL/L (ref 94–109)
CHLORIDE SERPL-SCNC: 109 MMOL/L (ref 94–109)
CHLORIDE SERPL-SCNC: 110 MMOL/L (ref 94–109)
CHLORIDE SERPL-SCNC: 110 MMOL/L (ref 94–109)
CHLORIDE SERPL-SCNC: 117 MMOL/L (ref 94–109)
CHLORIDE SERPL-SCNC: 97 MMOL/L (ref 94–109)
CHLORIDE SERPL-SCNC: 98 MMOL/L (ref 94–109)
CHLORIDE SERPLBLD-SCNC: 100 MMOL/L (ref 98–109)
CHOLEST SERPL-MCNC: 146 MG/DL
CHOLEST SERPL-MCNC: 97 MG/DL
CK SERPL-CCNC: 238 U/L (ref 30–300)
CK SERPL-CCNC: 93 U/L (ref 30–300)
CO2 SERPL-SCNC: 22 MMOL/L (ref 20–32)
CO2 SERPL-SCNC: 24 MMOL/L (ref 20–32)
CO2 SERPL-SCNC: 24 MMOL/L (ref 20–32)
CO2 SERPL-SCNC: 25 MMOL/L (ref 20–32)
CO2 SERPL-SCNC: 26 MMOL/L (ref 20–32)
CO2 SERPL-SCNC: 27 MMOL/L (ref 20–29)
CO2 SERPL-SCNC: 27 MMOL/L (ref 20–32)
CO2 SERPL-SCNC: 27 MMOL/L (ref 20–32)
CO2 SERPL-SCNC: 28 MMOL/L (ref 20–32)
CO2 SERPL-SCNC: 28 MMOL/L (ref 20–32)
CO2 SERPL-SCNC: 29 MMOL/L (ref 20–32)
COLOR UR AUTO: YELLOW
CREAT SERPL-MCNC: 0.53 MG/DL (ref 0.73–1.18)
CREAT SERPL-MCNC: 0.54 MG/DL (ref 0.66–1.25)
CREAT SERPL-MCNC: 0.6 MG/DL (ref 0.66–1.25)
CREAT SERPL-MCNC: 0.64 MG/DL (ref 0.66–1.25)
CREAT SERPL-MCNC: 0.67 MG/DL (ref 0.66–1.25)
CREAT SERPL-MCNC: 0.7 MG/DL (ref 0.66–1.25)
CREAT SERPL-MCNC: 0.71 MG/DL (ref 0.66–1.25)
CREAT SERPL-MCNC: 0.72 MG/DL (ref 0.66–1.25)
CREAT SERPL-MCNC: 0.76 MG/DL (ref 0.66–1.25)
CREAT SERPL-MCNC: 0.78 MG/DL (ref 0.66–1.25)
CREAT SERPL-MCNC: 0.86 MG/DL (ref 0.66–1.25)
CREAT SERPL-MCNC: 0.86 MG/DL (ref 0.66–1.25)
CREAT SERPL-MCNC: 0.87 MG/DL (ref 0.66–1.25)
CREAT SERPL-MCNC: 0.89 MG/DL (ref 0.66–1.25)
CREAT SERPL-MCNC: 0.89 MG/DL (ref 0.66–1.25)
CREAT SERPL-MCNC: 0.96 MG/DL (ref 0.66–1.25)
CREAT SERPL-MCNC: 1.08 MG/DL (ref 0.66–1.25)
CRP SERPL-MCNC: 156 MG/L (ref 0–8)
CRP SERPL-MCNC: 6.8 MG/L (ref 0–8)
D DIMER PPP FEU-MCNC: 4.6 UG/ML FEU (ref 0–0.5)
DIFFERENTIAL METHOD BLD: ABNORMAL
EOSINOPHIL # BLD AUTO: 0 10E9/L (ref 0–0.7)
EOSINOPHIL # BLD AUTO: 0.1 10E9/L (ref 0–0.7)
EOSINOPHIL NFR BLD AUTO: 0.1 %
EOSINOPHIL NFR BLD AUTO: 0.2 %
EOSINOPHIL NFR BLD AUTO: 0.8 %
EOSINOPHIL NFR BLD AUTO: 0.9 %
EOSINOPHIL NFR BLD AUTO: 1.2 %
ERYTHROCYTE [DISTWIDTH] IN BLOOD BY AUTOMATED COUNT: 14.2 % (ref 10–15)
ERYTHROCYTE [DISTWIDTH] IN BLOOD BY AUTOMATED COUNT: 14.3 % (ref 10–15)
ERYTHROCYTE [DISTWIDTH] IN BLOOD BY AUTOMATED COUNT: 15 % (ref 10–15)
ERYTHROCYTE [DISTWIDTH] IN BLOOD BY AUTOMATED COUNT: 15 % (ref 10–15)
ERYTHROCYTE [DISTWIDTH] IN BLOOD BY AUTOMATED COUNT: 15.1 % (ref 10–15)
ERYTHROCYTE [DISTWIDTH] IN BLOOD BY AUTOMATED COUNT: 15.3 % (ref 10–15)
ERYTHROCYTE [DISTWIDTH] IN BLOOD BY AUTOMATED COUNT: 15.4 % (ref 10–15)
ERYTHROCYTE [DISTWIDTH] IN BLOOD BY AUTOMATED COUNT: 15.5 % (ref 10–15)
ERYTHROCYTE [DISTWIDTH] IN BLOOD BY AUTOMATED COUNT: 15.7 % (ref 10–15)
ERYTHROCYTE [DISTWIDTH] IN BLOOD BY AUTOMATED COUNT: 15.7 % (ref 10–15)
ERYTHROCYTE [DISTWIDTH] IN BLOOD BY AUTOMATED COUNT: 15.8 % (ref 10–15)
ERYTHROCYTE [DISTWIDTH] IN BLOOD BY AUTOMATED COUNT: 15.8 % (ref 10–15)
ERYTHROCYTE [DISTWIDTH] IN BLOOD BY AUTOMATED COUNT: 16.1 % (ref 10–15)
ERYTHROCYTE [DISTWIDTH] IN BLOOD BY AUTOMATED COUNT: NORMAL % (ref 10–15)
FOLATE SERPL-MCNC: 4.9 NG/ML
GFR SERPL CREATININE-BSD FRML MDRD: 59 ML/MIN/{1.73_M2}
GFR SERPL CREATININE-BSD FRML MDRD: 68 ML/MIN/{1.73_M2}
GFR SERPL CREATININE-BSD FRML MDRD: 74 ML/MIN/{1.73_M2}
GFR SERPL CREATININE-BSD FRML MDRD: 74 ML/MIN/{1.73_M2}
GFR SERPL CREATININE-BSD FRML MDRD: 75 ML/MIN/{1.73_M2}
GFR SERPL CREATININE-BSD FRML MDRD: 78 ML/MIN/{1.73_M2}
GFR SERPL CREATININE-BSD FRML MDRD: 79 ML/MIN/{1.73_M2}
GFR SERPL CREATININE-BSD FRML MDRD: 81 ML/MIN/{1.73_M2}
GFR SERPL CREATININE-BSD FRML MDRD: 81 ML/MIN/{1.73_M2}
GFR SERPL CREATININE-BSD FRML MDRD: 82 ML/MIN/{1.73_M2}
GFR SERPL CREATININE-BSD FRML MDRD: 83 ML/MIN/{1.73_M2}
GFR SERPL CREATININE-BSD FRML MDRD: 85 ML/MIN/{1.73_M2}
GFR SERPL CREATININE-BSD FRML MDRD: 87 ML/MIN/{1.73_M2}
GFR SERPL CREATININE-BSD FRML MDRD: >60 ML/MIN/1.73M2
GFR SERPL CREATININE-BSD FRML MDRD: >90 ML/MIN/{1.73_M2}
GLUCOSE BLDC GLUCOMTR-MCNC: 100 MG/DL (ref 70–99)
GLUCOSE BLDC GLUCOMTR-MCNC: 100 MG/DL (ref 70–99)
GLUCOSE BLDC GLUCOMTR-MCNC: 101 MG/DL (ref 70–99)
GLUCOSE BLDC GLUCOMTR-MCNC: 101 MG/DL (ref 70–99)
GLUCOSE BLDC GLUCOMTR-MCNC: 102 MG/DL (ref 70–99)
GLUCOSE BLDC GLUCOMTR-MCNC: 103 MG/DL (ref 70–99)
GLUCOSE BLDC GLUCOMTR-MCNC: 104 MG/DL (ref 70–99)
GLUCOSE BLDC GLUCOMTR-MCNC: 106 MG/DL (ref 70–99)
GLUCOSE BLDC GLUCOMTR-MCNC: 108 MG/DL (ref 70–99)
GLUCOSE BLDC GLUCOMTR-MCNC: 114 MG/DL (ref 70–99)
GLUCOSE BLDC GLUCOMTR-MCNC: 116 MG/DL (ref 70–99)
GLUCOSE BLDC GLUCOMTR-MCNC: 118 MG/DL (ref 70–99)
GLUCOSE BLDC GLUCOMTR-MCNC: 119 MG/DL (ref 70–99)
GLUCOSE BLDC GLUCOMTR-MCNC: 120 MG/DL (ref 70–99)
GLUCOSE BLDC GLUCOMTR-MCNC: 123 MG/DL (ref 70–99)
GLUCOSE BLDC GLUCOMTR-MCNC: 123 MG/DL (ref 70–99)
GLUCOSE BLDC GLUCOMTR-MCNC: 128 MG/DL (ref 70–99)
GLUCOSE BLDC GLUCOMTR-MCNC: 129 MG/DL (ref 70–99)
GLUCOSE BLDC GLUCOMTR-MCNC: 129 MG/DL (ref 70–99)
GLUCOSE BLDC GLUCOMTR-MCNC: 130 MG/DL (ref 70–99)
GLUCOSE BLDC GLUCOMTR-MCNC: 131 MG/DL (ref 70–99)
GLUCOSE BLDC GLUCOMTR-MCNC: 132 MG/DL (ref 70–99)
GLUCOSE BLDC GLUCOMTR-MCNC: 133 MG/DL (ref 70–99)
GLUCOSE BLDC GLUCOMTR-MCNC: 134 MG/DL (ref 70–99)
GLUCOSE BLDC GLUCOMTR-MCNC: 134 MG/DL (ref 70–99)
GLUCOSE BLDC GLUCOMTR-MCNC: 135 MG/DL (ref 70–99)
GLUCOSE BLDC GLUCOMTR-MCNC: 137 MG/DL (ref 70–99)
GLUCOSE BLDC GLUCOMTR-MCNC: 138 MG/DL (ref 70–99)
GLUCOSE BLDC GLUCOMTR-MCNC: 139 MG/DL (ref 70–99)
GLUCOSE BLDC GLUCOMTR-MCNC: 142 MG/DL (ref 70–99)
GLUCOSE BLDC GLUCOMTR-MCNC: 143 MG/DL (ref 70–99)
GLUCOSE BLDC GLUCOMTR-MCNC: 143 MG/DL (ref 70–99)
GLUCOSE BLDC GLUCOMTR-MCNC: 144 MG/DL (ref 70–99)
GLUCOSE BLDC GLUCOMTR-MCNC: 148 MG/DL (ref 70–99)
GLUCOSE BLDC GLUCOMTR-MCNC: 148 MG/DL (ref 70–99)
GLUCOSE BLDC GLUCOMTR-MCNC: 149 MG/DL (ref 70–99)
GLUCOSE BLDC GLUCOMTR-MCNC: 152 MG/DL (ref 70–99)
GLUCOSE BLDC GLUCOMTR-MCNC: 153 MG/DL (ref 70–99)
GLUCOSE BLDC GLUCOMTR-MCNC: 154 MG/DL (ref 70–99)
GLUCOSE BLDC GLUCOMTR-MCNC: 155 MG/DL (ref 70–99)
GLUCOSE BLDC GLUCOMTR-MCNC: 157 MG/DL (ref 70–99)
GLUCOSE BLDC GLUCOMTR-MCNC: 157 MG/DL (ref 70–99)
GLUCOSE BLDC GLUCOMTR-MCNC: 158 MG/DL (ref 70–99)
GLUCOSE BLDC GLUCOMTR-MCNC: 158 MG/DL (ref 70–99)
GLUCOSE BLDC GLUCOMTR-MCNC: 159 MG/DL (ref 70–99)
GLUCOSE BLDC GLUCOMTR-MCNC: 160 MG/DL (ref 70–99)
GLUCOSE BLDC GLUCOMTR-MCNC: 160 MG/DL (ref 70–99)
GLUCOSE BLDC GLUCOMTR-MCNC: 161 MG/DL (ref 70–99)
GLUCOSE BLDC GLUCOMTR-MCNC: 162 MG/DL (ref 70–99)
GLUCOSE BLDC GLUCOMTR-MCNC: 163 MG/DL (ref 70–99)
GLUCOSE BLDC GLUCOMTR-MCNC: 165 MG/DL (ref 70–99)
GLUCOSE BLDC GLUCOMTR-MCNC: 167 MG/DL (ref 70–99)
GLUCOSE BLDC GLUCOMTR-MCNC: 168 MG/DL (ref 70–99)
GLUCOSE BLDC GLUCOMTR-MCNC: 170 MG/DL (ref 70–99)
GLUCOSE BLDC GLUCOMTR-MCNC: 171 MG/DL (ref 70–99)
GLUCOSE BLDC GLUCOMTR-MCNC: 172 MG/DL (ref 70–99)
GLUCOSE BLDC GLUCOMTR-MCNC: 172 MG/DL (ref 70–99)
GLUCOSE BLDC GLUCOMTR-MCNC: 178 MG/DL (ref 70–99)
GLUCOSE BLDC GLUCOMTR-MCNC: 180 MG/DL (ref 70–99)
GLUCOSE BLDC GLUCOMTR-MCNC: 182 MG/DL (ref 70–99)
GLUCOSE BLDC GLUCOMTR-MCNC: 182 MG/DL (ref 70–99)
GLUCOSE BLDC GLUCOMTR-MCNC: 184 MG/DL (ref 70–99)
GLUCOSE BLDC GLUCOMTR-MCNC: 185 MG/DL (ref 70–99)
GLUCOSE BLDC GLUCOMTR-MCNC: 186 MG/DL (ref 70–99)
GLUCOSE BLDC GLUCOMTR-MCNC: 190 MG/DL (ref 70–99)
GLUCOSE BLDC GLUCOMTR-MCNC: 196 MG/DL (ref 70–99)
GLUCOSE BLDC GLUCOMTR-MCNC: 198 MG/DL (ref 70–99)
GLUCOSE BLDC GLUCOMTR-MCNC: 199 MG/DL (ref 70–99)
GLUCOSE BLDC GLUCOMTR-MCNC: 201 MG/DL (ref 70–99)
GLUCOSE BLDC GLUCOMTR-MCNC: 205 MG/DL (ref 70–99)
GLUCOSE BLDC GLUCOMTR-MCNC: 208 MG/DL (ref 70–99)
GLUCOSE BLDC GLUCOMTR-MCNC: 219 MG/DL (ref 70–99)
GLUCOSE BLDC GLUCOMTR-MCNC: 220 MG/DL (ref 70–99)
GLUCOSE BLDC GLUCOMTR-MCNC: 221 MG/DL (ref 70–99)
GLUCOSE BLDC GLUCOMTR-MCNC: 246 MG/DL (ref 70–99)
GLUCOSE BLDC GLUCOMTR-MCNC: 55 MG/DL (ref 70–99)
GLUCOSE BLDC GLUCOMTR-MCNC: 56 MG/DL (ref 70–99)
GLUCOSE BLDC GLUCOMTR-MCNC: 62 MG/DL (ref 70–99)
GLUCOSE BLDC GLUCOMTR-MCNC: 68 MG/DL (ref 70–99)
GLUCOSE BLDC GLUCOMTR-MCNC: 73 MG/DL (ref 70–99)
GLUCOSE BLDC GLUCOMTR-MCNC: 77 MG/DL (ref 70–99)
GLUCOSE BLDC GLUCOMTR-MCNC: 78 MG/DL (ref 70–99)
GLUCOSE BLDC GLUCOMTR-MCNC: 81 MG/DL (ref 70–99)
GLUCOSE BLDC GLUCOMTR-MCNC: 82 MG/DL (ref 70–99)
GLUCOSE BLDC GLUCOMTR-MCNC: 82 MG/DL (ref 70–99)
GLUCOSE BLDC GLUCOMTR-MCNC: 85 MG/DL (ref 70–99)
GLUCOSE BLDC GLUCOMTR-MCNC: 86 MG/DL (ref 70–99)
GLUCOSE BLDC GLUCOMTR-MCNC: 90 MG/DL (ref 70–99)
GLUCOSE BLDC GLUCOMTR-MCNC: 93 MG/DL (ref 70–99)
GLUCOSE BLDC GLUCOMTR-MCNC: 94 MG/DL (ref 70–99)
GLUCOSE BLDC GLUCOMTR-MCNC: 95 MG/DL (ref 70–99)
GLUCOSE BLDC GLUCOMTR-MCNC: 95 MG/DL (ref 70–99)
GLUCOSE BLDC GLUCOMTR-MCNC: 97 MG/DL (ref 70–99)
GLUCOSE SERPL-MCNC: 105 MG/DL (ref 70–99)
GLUCOSE SERPL-MCNC: 108 MG/DL (ref 70–99)
GLUCOSE SERPL-MCNC: 110 MG/DL (ref 70–99)
GLUCOSE SERPL-MCNC: 112 MG/DL (ref 70–99)
GLUCOSE SERPL-MCNC: 141 MG/DL (ref 70–99)
GLUCOSE SERPL-MCNC: 152 MG/DL (ref 70–100)
GLUCOSE SERPL-MCNC: 155 MG/DL (ref 70–99)
GLUCOSE SERPL-MCNC: 157 MG/DL (ref 70–99)
GLUCOSE SERPL-MCNC: 160 MG/DL (ref 70–99)
GLUCOSE SERPL-MCNC: 176 MG/DL (ref 70–99)
GLUCOSE SERPL-MCNC: 181 MG/DL (ref 70–99)
GLUCOSE SERPL-MCNC: 184 MG/DL (ref 70–99)
GLUCOSE SERPL-MCNC: 198 MG/DL (ref 70–99)
GLUCOSE SERPL-MCNC: 228 MG/DL (ref 70–99)
GLUCOSE SERPL-MCNC: 292 MG/DL (ref 70–99)
GLUCOSE SERPL-MCNC: 59 MG/DL (ref 70–99)
GLUCOSE SERPL-MCNC: 86 MG/DL (ref 70–99)
GLUCOSE SERPL-MCNC: 91 MG/DL (ref 70–99)
GLUCOSE SERPL-MCNC: 92 MG/DL (ref 70–99)
GLUCOSE UR STRIP-MCNC: 30 MG/DL
GLUCOSE UR STRIP-MCNC: NEGATIVE MG/DL
HBA1C MFR BLD: 6.3 % (ref 0–5.6)
HBA1C MFR BLD: 8.3 % (ref 0–5.6)
HCO3 BLDV-SCNC: 27 MMOL/L (ref 21–28)
HCT VFR BLD AUTO: 28.5 % (ref 40–53)
HCT VFR BLD AUTO: 30.8 % (ref 40–53)
HCT VFR BLD AUTO: 31.4 % (ref 40–53)
HCT VFR BLD AUTO: 32.5 % (ref 40–53)
HCT VFR BLD AUTO: 32.9 % (ref 40–53)
HCT VFR BLD AUTO: 34.4 % (ref 40–53)
HCT VFR BLD AUTO: 34.8 % (ref 40–53)
HCT VFR BLD AUTO: 36.4 % (ref 40–53)
HCT VFR BLD AUTO: 36.6 % (ref 40–53)
HCT VFR BLD AUTO: 36.8 % (ref 40–53)
HCT VFR BLD AUTO: 37.3 % (ref 40–53)
HCT VFR BLD AUTO: 37.6 % (ref 40–53)
HCT VFR BLD AUTO: 37.7 % (ref 40–53)
HCT VFR BLD AUTO: 38.3 % (ref 40–53)
HCT VFR BLD AUTO: 38.3 % (ref 40–53)
HCT VFR BLD AUTO: 39.2 % (ref 40–53)
HCT VFR BLD AUTO: 40.2 % (ref 40–53)
HCT VFR BLD AUTO: NORMAL % (ref 40–53)
HDLC SERPL-MCNC: 51 MG/DL
HDLC SERPL-MCNC: 52 MG/DL
HGB BLD-MCNC: 10 G/DL (ref 13.3–17.7)
HGB BLD-MCNC: 10.3 G/DL (ref 13.3–17.7)
HGB BLD-MCNC: 10.3 G/DL (ref 13.3–17.7)
HGB BLD-MCNC: 10.5 G/DL (ref 13.3–17.7)
HGB BLD-MCNC: 10.8 G/DL (ref 13.3–17.7)
HGB BLD-MCNC: 10.8 G/DL (ref 13.3–17.7)
HGB BLD-MCNC: 11.3 G/DL (ref 13.3–17.7)
HGB BLD-MCNC: 11.5 G/DL (ref 13.3–17.7)
HGB BLD-MCNC: 11.7 G/DL (ref 13.3–17.7)
HGB BLD-MCNC: 11.8 G/DL (ref 13.3–17.7)
HGB BLD-MCNC: 11.9 G/DL (ref 13.3–17.7)
HGB BLD-MCNC: 12.1 G/DL (ref 13.3–17.7)
HGB BLD-MCNC: 12.2 G/DL (ref 13.3–17.7)
HGB BLD-MCNC: 12.4 G/DL (ref 13.3–17.7)
HGB BLD-MCNC: 8.6 G/DL (ref 13.3–17.7)
HGB BLD-MCNC: 9 G/DL (ref 13.3–17.7)
HGB BLD-MCNC: 9.2 G/DL (ref 13.3–17.7)
HGB BLD-MCNC: 9.3 G/DL (ref 13.3–17.7)
HGB BLD-MCNC: 9.5 G/DL (ref 13.3–17.7)
HGB BLD-MCNC: 9.7 G/DL (ref 13.3–17.7)
HGB BLD-MCNC: NORMAL G/DL (ref 13.3–17.7)
HGB UR QL STRIP: ABNORMAL
HGB UR QL STRIP: ABNORMAL
HGB UR QL STRIP: NEGATIVE
IMM GRANULOCYTES # BLD: 0 10E9/L (ref 0–0.4)
IMM GRANULOCYTES # BLD: 0.1 10E9/L (ref 0–0.4)
IMM GRANULOCYTES # BLD: 0.1 10E9/L (ref 0–0.4)
IMM GRANULOCYTES NFR BLD: 0.1 %
IMM GRANULOCYTES NFR BLD: 0.2 %
IMM GRANULOCYTES NFR BLD: 0.3 %
IMM GRANULOCYTES NFR BLD: 0.4 %
IMM GRANULOCYTES NFR BLD: 0.4 %
IMM GRANULOCYTES NFR BLD: 0.8 %
INR PPP: 1.02 (ref 0.86–1.14)
INR PPP: 1.03 (ref 0.86–1.14)
INR PPP: 1.07 (ref 0.86–1.14)
INR PPP: 1.08 (ref 0.86–1.14)
INTERPRETATION ECG - MUSE: NORMAL
KETONES UR STRIP-MCNC: 10 MG/DL
KETONES UR STRIP-MCNC: 10 MG/DL
KETONES UR STRIP-MCNC: 5 MG/DL
KETONES UR STRIP-MCNC: 5 MG/DL
KETONES UR STRIP-MCNC: NEGATIVE MG/DL
LABORATORY COMMENT REPORT: NORMAL
LACOSAMIDE SERPL-MCNC: 10.3 UG/ML (ref 5–10)
LACOSAMIDE SERPL-MCNC: 14.3 UG/ML (ref 5–10)
LACTATE BLD-SCNC: 2.1 MMOL/L (ref 0.7–2)
LACTATE BLD-SCNC: 2.8 MMOL/L (ref 0.7–2)
LACTATE BLD-SCNC: 3.7 MMOL/L (ref 0.7–2)
LDH SERPL L TO P-CCNC: 746 U/L (ref 85–227)
LDLC SERPL CALC-MCNC: 26 MG/DL
LDLC SERPL CALC-MCNC: 69 MG/DL
LEUKOCYTE ESTERASE UR QL STRIP: ABNORMAL
LEUKOCYTE ESTERASE UR QL STRIP: ABNORMAL
LEUKOCYTE ESTERASE UR QL STRIP: NEGATIVE
LEVETIRACETAM SERPL-MCNC: 19 UG/ML (ref 12–46)
LEVETIRACETAM SERPL-MCNC: 26 UG/ML (ref 12–46)
LEVETIRACETAM SERPL-MCNC: 40 UG/ML (ref 12–46)
LEVETIRACETAM SERPL-MCNC: 40 UG/ML (ref 12–46)
LEVETIRACETAM SERPL-MCNC: 48 UG/ML (ref 12–46)
LIPASE SERPL-CCNC: 70 U/L (ref 73–393)
LYMPHOCYTES # BLD AUTO: 1.5 10E9/L (ref 0.8–5.3)
LYMPHOCYTES # BLD AUTO: 1.6 10E9/L (ref 0.8–5.3)
LYMPHOCYTES # BLD AUTO: 1.7 10E9/L (ref 0.8–5.3)
LYMPHOCYTES # BLD AUTO: 1.9 10E9/L (ref 0.8–5.3)
LYMPHOCYTES # BLD AUTO: 2 10E9/L (ref 0.8–5.3)
LYMPHOCYTES # BLD AUTO: 2.1 10E9/L (ref 0.8–5.3)
LYMPHOCYTES # BLD AUTO: 2.2 10E9/L (ref 0.8–5.3)
LYMPHOCYTES # BLD AUTO: 2.9 10E9/L (ref 0.8–5.3)
LYMPHOCYTES NFR BLD AUTO: 15.4 %
LYMPHOCYTES NFR BLD AUTO: 15.8 %
LYMPHOCYTES NFR BLD AUTO: 16.7 %
LYMPHOCYTES NFR BLD AUTO: 21.2 %
LYMPHOCYTES NFR BLD AUTO: 23.3 %
LYMPHOCYTES NFR BLD AUTO: 23.5 %
LYMPHOCYTES NFR BLD AUTO: 24.1 %
LYMPHOCYTES NFR BLD AUTO: 26.3 %
LYMPHOCYTES NFR BLD AUTO: 33 %
LYMPHOCYTES NFR BLD AUTO: 9.1 %
Lab: NORMAL
MAGNESIUM SERPL-MCNC: 1.4 MG/DL (ref 1.6–2.3)
MAGNESIUM SERPL-MCNC: 1.6 MG/DL (ref 1.6–2.3)
MAGNESIUM SERPL-MCNC: 1.6 MG/DL (ref 1.6–2.3)
MAGNESIUM SERPL-MCNC: 1.7 MG/DL (ref 1.6–2.3)
MAGNESIUM SERPL-MCNC: 1.9 MG/DL (ref 1.6–2.3)
MAGNESIUM SERPL-MCNC: 2.1 MG/DL (ref 1.6–2.3)
MAGNESIUM SERPL-MCNC: 3.1 MG/DL (ref 1.6–2.3)
MCH RBC QN AUTO: 27.4 PG (ref 26.5–33)
MCH RBC QN AUTO: 27.6 PG (ref 26.5–33)
MCH RBC QN AUTO: 27.7 PG (ref 26.5–33)
MCH RBC QN AUTO: 27.8 PG (ref 26.5–33)
MCH RBC QN AUTO: 27.8 PG (ref 26.5–33)
MCH RBC QN AUTO: 27.9 PG (ref 26.5–33)
MCH RBC QN AUTO: 28 PG (ref 26.5–33)
MCH RBC QN AUTO: 28 PG (ref 26.5–33)
MCH RBC QN AUTO: 28.1 PG (ref 26.5–33)
MCH RBC QN AUTO: 28.2 PG (ref 26.5–33)
MCH RBC QN AUTO: 28.3 PG (ref 26.5–33)
MCH RBC QN AUTO: 28.4 PG (ref 26.5–33)
MCH RBC QN AUTO: 28.5 PG (ref 26.5–33)
MCH RBC QN AUTO: 28.6 PG (ref 26.5–33)
MCH RBC QN AUTO: 28.6 PG (ref 26.5–33)
MCH RBC QN AUTO: 28.7 PG (ref 26.5–33)
MCH RBC QN AUTO: 28.8 PG (ref 26.5–33)
MCH RBC QN AUTO: NORMAL PG (ref 26.5–33)
MCHC RBC AUTO-ENTMCNC: 30.7 G/DL (ref 31.5–36.5)
MCHC RBC AUTO-ENTMCNC: 30.8 G/DL (ref 31.5–36.5)
MCHC RBC AUTO-ENTMCNC: 30.9 G/DL (ref 31.5–36.5)
MCHC RBC AUTO-ENTMCNC: 31 G/DL (ref 31.5–36.5)
MCHC RBC AUTO-ENTMCNC: 31.1 G/DL (ref 31.5–36.5)
MCHC RBC AUTO-ENTMCNC: 31.1 G/DL (ref 31.5–36.5)
MCHC RBC AUTO-ENTMCNC: 31.4 G/DL (ref 31.5–36.5)
MCHC RBC AUTO-ENTMCNC: 31.4 G/DL (ref 31.5–36.5)
MCHC RBC AUTO-ENTMCNC: 31.6 G/DL (ref 31.5–36.5)
MCHC RBC AUTO-ENTMCNC: 31.7 G/DL (ref 31.5–36.5)
MCHC RBC AUTO-ENTMCNC: 31.9 G/DL (ref 31.5–36.5)
MCHC RBC AUTO-ENTMCNC: 31.9 G/DL (ref 31.5–36.5)
MCHC RBC AUTO-ENTMCNC: 32 G/DL (ref 31.5–36.5)
MCHC RBC AUTO-ENTMCNC: 32.5 G/DL (ref 31.5–36.5)
MCHC RBC AUTO-ENTMCNC: NORMAL G/DL (ref 31.5–36.5)
MCV RBC AUTO: 88 FL (ref 78–100)
MCV RBC AUTO: 88 FL (ref 78–100)
MCV RBC AUTO: 89 FL (ref 78–100)
MCV RBC AUTO: 90 FL (ref 78–100)
MCV RBC AUTO: 91 FL (ref 78–100)
MCV RBC AUTO: NORMAL FL (ref 78–100)
MONOCYTES # BLD AUTO: 0.5 10E9/L (ref 0–1.3)
MONOCYTES # BLD AUTO: 0.6 10E9/L (ref 0–1.3)
MONOCYTES # BLD AUTO: 0.8 10E9/L (ref 0–1.3)
MONOCYTES # BLD AUTO: 0.9 10E9/L (ref 0–1.3)
MONOCYTES # BLD AUTO: 1 10E9/L (ref 0–1.3)
MONOCYTES # BLD AUTO: 1 10E9/L (ref 0–1.3)
MONOCYTES # BLD AUTO: 1.2 10E9/L (ref 0–1.3)
MONOCYTES # BLD AUTO: 1.6 10E9/L (ref 0–1.3)
MONOCYTES NFR BLD AUTO: 11.1 %
MONOCYTES NFR BLD AUTO: 6.4 %
MONOCYTES NFR BLD AUTO: 7.7 %
MONOCYTES NFR BLD AUTO: 8.5 %
MONOCYTES NFR BLD AUTO: 8.6 %
MONOCYTES NFR BLD AUTO: 8.9 %
MONOCYTES NFR BLD AUTO: 9.2 %
MONOCYTES NFR BLD AUTO: 9.3 %
MONOCYTES NFR BLD AUTO: 9.3 %
MONOCYTES NFR BLD AUTO: 9.7 %
MUCOUS THREADS #/AREA URNS LPF: PRESENT /LPF
NEUTROPHILS # BLD AUTO: 13.2 10E9/L (ref 1.6–8.3)
NEUTROPHILS # BLD AUTO: 5 10E9/L (ref 1.6–8.3)
NEUTROPHILS # BLD AUTO: 5.1 10E9/L (ref 1.6–8.3)
NEUTROPHILS # BLD AUTO: 5.5 10E9/L (ref 1.6–8.3)
NEUTROPHILS # BLD AUTO: 5.7 10E9/L (ref 1.6–8.3)
NEUTROPHILS # BLD AUTO: 5.7 10E9/L (ref 1.6–8.3)
NEUTROPHILS # BLD AUTO: 7.2 10E9/L (ref 1.6–8.3)
NEUTROPHILS # BLD AUTO: 7.3 10E9/L (ref 1.6–8.3)
NEUTROPHILS # BLD AUTO: 8.1 10E9/L (ref 1.6–8.3)
NEUTROPHILS # BLD AUTO: 9.6 10E9/L (ref 1.6–8.3)
NEUTROPHILS NFR BLD AUTO: 56.8 %
NEUTROPHILS NFR BLD AUTO: 63 %
NEUTROPHILS NFR BLD AUTO: 64.3 %
NEUTROPHILS NFR BLD AUTO: 68.4 %
NEUTROPHILS NFR BLD AUTO: 68.4 %
NEUTROPHILS NFR BLD AUTO: 69 %
NEUTROPHILS NFR BLD AUTO: 74.2 %
NEUTROPHILS NFR BLD AUTO: 74.3 %
NEUTROPHILS NFR BLD AUTO: 75.5 %
NEUTROPHILS NFR BLD AUTO: 80.6 %
NITRATE UR QL: NEGATIVE
NONHDLC SERPL-MCNC: 46 MG/DL
NONHDLC SERPL-MCNC: 94 MG/DL
NRBC # BLD AUTO: 0 10*3/UL
NRBC BLD AUTO-RTO: 0 /100
O2/TOTAL GAS SETTING VFR VENT: ABNORMAL %
PCO2 BLDV: 48 MM HG (ref 40–50)
PH BLDV: 7.36 PH (ref 7.32–7.43)
PH UR STRIP: 5.5 PH (ref 5–7)
PH UR STRIP: 6 PH (ref 5–7)
PH UR STRIP: 6 PH (ref 5–7)
PHOSPHATE SERPL-MCNC: 2.1 MG/DL (ref 2.5–4.5)
PHOSPHATE SERPL-MCNC: 2.3 MG/DL (ref 2.5–4.5)
PHOSPHATE SERPL-MCNC: 2.9 MG/DL (ref 2.5–4.5)
PHOSPHATE SERPL-MCNC: 3 MG/DL (ref 2.5–4.5)
PHOSPHATE SERPL-MCNC: NORMAL MG/DL (ref 2.5–4.5)
PLATELET # BLD AUTO: 172 10E9/L (ref 150–450)
PLATELET # BLD AUTO: 180 10E9/L (ref 150–450)
PLATELET # BLD AUTO: 193 10E9/L (ref 150–450)
PLATELET # BLD AUTO: 193 10E9/L (ref 150–450)
PLATELET # BLD AUTO: 195 10E9/L (ref 150–450)
PLATELET # BLD AUTO: 209 10E9/L (ref 150–450)
PLATELET # BLD AUTO: 210 10E9/L (ref 150–450)
PLATELET # BLD AUTO: 216 10E9/L (ref 150–450)
PLATELET # BLD AUTO: 217 10E9/L (ref 150–450)
PLATELET # BLD AUTO: 226 10E9/L (ref 150–450)
PLATELET # BLD AUTO: 228 10E9/L (ref 150–450)
PLATELET # BLD AUTO: 228 10E9/L (ref 150–450)
PLATELET # BLD AUTO: 242 10E9/L (ref 150–450)
PLATELET # BLD AUTO: 242 10E9/L (ref 150–450)
PLATELET # BLD AUTO: 267 10E9/L (ref 150–450)
PLATELET # BLD AUTO: 288 10E9/L (ref 150–450)
PLATELET # BLD AUTO: 302 10E9/L (ref 150–450)
PLATELET # BLD AUTO: NORMAL 10E9/L (ref 150–450)
PO2 BLDV: 22 MM HG (ref 25–47)
POTASSIUM SERPL-SCNC: 3.6 MMOL/L (ref 3.4–5.3)
POTASSIUM SERPL-SCNC: 3.8 MMOL/L (ref 3.4–5.3)
POTASSIUM SERPL-SCNC: 3.8 MMOL/L (ref 3.4–5.3)
POTASSIUM SERPL-SCNC: 3.9 MMOL/L (ref 3.4–5.3)
POTASSIUM SERPL-SCNC: 4 MMOL/L (ref 3.5–5.1)
POTASSIUM SERPL-SCNC: 4.1 MMOL/L (ref 3.4–5.3)
POTASSIUM SERPL-SCNC: 4.3 MMOL/L (ref 3.4–5.3)
POTASSIUM SERPL-SCNC: 4.3 MMOL/L (ref 3.4–5.3)
POTASSIUM SERPL-SCNC: 4.4 MMOL/L (ref 3.4–5.3)
POTASSIUM SERPL-SCNC: 4.4 MMOL/L (ref 3.4–5.3)
POTASSIUM SERPL-SCNC: 4.5 MMOL/L (ref 3.4–5.3)
POTASSIUM SERPL-SCNC: 4.6 MMOL/L (ref 3.4–5.3)
POTASSIUM SERPL-SCNC: 4.8 MMOL/L (ref 3.4–5.3)
POTASSIUM SERPL-SCNC: 4.9 MMOL/L (ref 3.4–5.3)
POTASSIUM SERPL-SCNC: 5.1 MMOL/L (ref 3.4–5.3)
PROCALCITONIN SERPL-MCNC: <0.05 NG/ML
PROT SERPL-MCNC: 5.7 G/DL (ref 6.8–8.8)
PROT SERPL-MCNC: 6.5 G/DL (ref 6.8–8.8)
PROT SERPL-MCNC: 6.5 G/DL (ref 6.8–8.8)
PROT SERPL-MCNC: 6.7 G/DL (ref 6.8–8.8)
PROT SERPL-MCNC: 6.8 G/DL (ref 6.8–8.8)
PROT SERPL-MCNC: 7 G/DL (ref 6.8–8.8)
RBC # BLD AUTO: 3.18 10E12/L (ref 4.4–5.9)
RBC # BLD AUTO: 3.46 10E12/L (ref 4.4–5.9)
RBC # BLD AUTO: 3.5 10E12/L (ref 4.4–5.9)
RBC # BLD AUTO: 3.59 10E12/L (ref 4.4–5.9)
RBC # BLD AUTO: 3.65 10E12/L (ref 4.4–5.9)
RBC # BLD AUTO: 3.83 10E12/L (ref 4.4–5.9)
RBC # BLD AUTO: 3.9 10E12/L (ref 4.4–5.9)
RBC # BLD AUTO: 4.09 10E12/L (ref 4.4–5.9)
RBC # BLD AUTO: 4.1 10E12/L (ref 4.4–5.9)
RBC # BLD AUTO: 4.12 10E12/L (ref 4.4–5.9)
RBC # BLD AUTO: 4.15 10E12/L (ref 4.4–5.9)
RBC # BLD AUTO: 4.17 10E12/L (ref 4.4–5.9)
RBC # BLD AUTO: 4.24 10E12/L (ref 4.4–5.9)
RBC # BLD AUTO: 4.28 10E12/L (ref 4.4–5.9)
RBC # BLD AUTO: 4.36 10E12/L (ref 4.4–5.9)
RBC # BLD AUTO: 4.37 10E12/L (ref 4.4–5.9)
RBC # BLD AUTO: 4.49 10E12/L (ref 4.4–5.9)
RBC # BLD AUTO: NORMAL 10E12/L (ref 4.4–5.9)
RBC #/AREA URNS AUTO: 2 /HPF (ref 0–2)
RBC #/AREA URNS AUTO: 22 /HPF (ref 0–2)
RBC #/AREA URNS AUTO: 3 /HPF (ref 0–2)
RBC #/AREA URNS AUTO: <1 /HPF (ref 0–2)
RBC #/AREA URNS AUTO: >182 /HPF (ref 0–2)
RETINOPATHY: NORMAL
SARS-COV-2 PCR COMMENT: NORMAL
SARS-COV-2 PCR COMMENT: NORMAL
SARS-COV-2 RNA SPEC QL NAA+PROBE: NEGATIVE
SARS-COV-2 RNA SPEC QL NAA+PROBE: NORMAL
SARS-COV-2 RNA SPEC QL NAA+PROBE: NOT DETECTED
SODIUM SERPL-SCNC: 132 MMOL/L (ref 133–144)
SODIUM SERPL-SCNC: 132 MMOL/L (ref 133–144)
SODIUM SERPL-SCNC: 134 MMOL/L (ref 133–144)
SODIUM SERPL-SCNC: 134 MMOL/L (ref 136–145)
SODIUM SERPL-SCNC: 135 MMOL/L (ref 133–144)
SODIUM SERPL-SCNC: 136 MMOL/L (ref 133–144)
SODIUM SERPL-SCNC: 137 MMOL/L (ref 133–144)
SODIUM SERPL-SCNC: 138 MMOL/L (ref 133–144)
SODIUM SERPL-SCNC: 139 MMOL/L (ref 133–144)
SODIUM SERPL-SCNC: 145 MMOL/L (ref 133–144)
SOURCE: ABNORMAL
SP GR UR STRIP: 1.01 (ref 1–1.03)
SP GR UR STRIP: 1.02 (ref 1–1.03)
SP GR UR STRIP: 1.02 (ref 1–1.03)
SP GR UR STRIP: 1.03 (ref 1–1.03)
SP GR UR STRIP: 1.03 (ref 1–1.03)
SPECIMEN SOURCE: ABNORMAL
SPECIMEN SOURCE: NORMAL
SQUAMOUS #/AREA URNS AUTO: 1 /HPF (ref 0–1)
SQUAMOUS #/AREA URNS AUTO: 4 /HPF (ref 0–1)
SQUAMOUS #/AREA URNS AUTO: <1 /HPF (ref 0–1)
SQUAMOUS #/AREA URNS AUTO: <1 /HPF (ref 0–1)
TRIGL SERPL-MCNC: 123 MG/DL
TRIGL SERPL-MCNC: 99 MG/DL
TROPONIN I SERPL-MCNC: 0.02 UG/L (ref 0–0.04)
TROPONIN I SERPL-MCNC: 0.03 UG/L (ref 0–0.04)
TROPONIN I SERPL-MCNC: 0.04 UG/L (ref 0–0.04)
TROPONIN I SERPL-MCNC: 0.06 UG/L (ref 0–0.04)
TSH SERPL DL<=0.005 MIU/L-ACNC: 1.12 MU/L (ref 0.4–4)
UROBILINOGEN UR STRIP-MCNC: 2 MG/DL (ref 0–2)
UROBILINOGEN UR STRIP-MCNC: NORMAL MG/DL (ref 0–2)
VIT B12 SERPL-MCNC: 302 PG/ML (ref 193–986)
WBC # BLD AUTO: 10.4 10E9/L (ref 4–11)
WBC # BLD AUTO: 10.7 10E9/L (ref 4–11)
WBC # BLD AUTO: 11.6 10E9/L (ref 4–11)
WBC # BLD AUTO: 12.4 10E9/L (ref 4–11)
WBC # BLD AUTO: 12.6 10E9/L (ref 4–11)
WBC # BLD AUTO: 12.9 10E9/L (ref 4–11)
WBC # BLD AUTO: 16.3 10E9/L (ref 4–11)
WBC # BLD AUTO: 5.9 10E9/L (ref 4–11)
WBC # BLD AUTO: 6 10E9/L (ref 4–11)
WBC # BLD AUTO: 7.8 10E9/L (ref 4–11)
WBC # BLD AUTO: 8.1 10E9/L (ref 4–11)
WBC # BLD AUTO: 8.3 10E9/L (ref 4–11)
WBC # BLD AUTO: 8.3 10E9/L (ref 4–11)
WBC # BLD AUTO: 8.6 10E9/L (ref 4–11)
WBC # BLD AUTO: 8.8 10E9/L (ref 4–11)
WBC # BLD AUTO: 9.8 10E9/L (ref 4–11)
WBC # BLD AUTO: 9.9 10E9/L (ref 4–11)
WBC # BLD AUTO: NORMAL 10E9/L (ref 4–11)
WBC #/AREA URNS AUTO: 0 /HPF (ref 0–5)
WBC #/AREA URNS AUTO: 2 /HPF (ref 0–5)
WBC #/AREA URNS AUTO: 3 /HPF (ref 0–5)
WBC #/AREA URNS AUTO: 4 /HPF (ref 0–5)
WBC #/AREA URNS AUTO: 84 /HPF (ref 0–5)

## 2020-01-01 PROCEDURE — 36415 COLL VENOUS BLD VENIPUNCTURE: CPT | Performed by: HOSPITALIST

## 2020-01-01 PROCEDURE — 81001 URINALYSIS AUTO W/SCOPE: CPT | Performed by: EMERGENCY MEDICINE

## 2020-01-01 PROCEDURE — 25000128 H RX IP 250 OP 636: Performed by: HOSPITALIST

## 2020-01-01 PROCEDURE — 84443 ASSAY THYROID STIM HORMONE: CPT | Performed by: HOSPITALIST

## 2020-01-01 PROCEDURE — 85027 COMPLETE CBC AUTOMATED: CPT | Performed by: INTERNAL MEDICINE

## 2020-01-01 PROCEDURE — 25000132 ZZH RX MED GY IP 250 OP 250 PS 637: Mod: GY | Performed by: INTERNAL MEDICINE

## 2020-01-01 PROCEDURE — 36415 COLL VENOUS BLD VENIPUNCTURE: CPT | Performed by: INTERNAL MEDICINE

## 2020-01-01 PROCEDURE — 12000000 ZZH R&B MED SURG/OB

## 2020-01-01 PROCEDURE — 25000131 ZZH RX MED GY IP 250 OP 636 PS 637: Mod: GY | Performed by: HOSPITALIST

## 2020-01-01 PROCEDURE — 72170 X-RAY EXAM OF PELVIS: CPT

## 2020-01-01 PROCEDURE — 99233 SBSQ HOSP IP/OBS HIGH 50: CPT | Performed by: HOSPITALIST

## 2020-01-01 PROCEDURE — 25000132 ZZH RX MED GY IP 250 OP 250 PS 637: Mod: GY | Performed by: HOSPITALIST

## 2020-01-01 PROCEDURE — 86140 C-REACTIVE PROTEIN: CPT | Performed by: INTERNAL MEDICINE

## 2020-01-01 PROCEDURE — 80235 DRUG ASSAY LACOSAMIDE: CPT | Performed by: EMERGENCY MEDICINE

## 2020-01-01 PROCEDURE — 99232 SBSQ HOSP IP/OBS MODERATE 35: CPT | Performed by: HOSPITALIST

## 2020-01-01 PROCEDURE — 25800030 ZZH RX IP 258 OP 636: Performed by: EMERGENCY MEDICINE

## 2020-01-01 PROCEDURE — G0378 HOSPITAL OBSERVATION PER HR: HCPCS

## 2020-01-01 PROCEDURE — 99223 1ST HOSP IP/OBS HIGH 75: CPT | Mod: AI | Performed by: INTERNAL MEDICINE

## 2020-01-01 PROCEDURE — 80048 BASIC METABOLIC PNL TOTAL CA: CPT | Performed by: INTERNAL MEDICINE

## 2020-01-01 PROCEDURE — 80076 HEPATIC FUNCTION PANEL: CPT | Performed by: EMERGENCY MEDICINE

## 2020-01-01 PROCEDURE — 25000128 H RX IP 250 OP 636: Performed by: PHYSICIAN ASSISTANT

## 2020-01-01 PROCEDURE — 25000125 ZZHC RX 250: Performed by: EMERGENCY MEDICINE

## 2020-01-01 PROCEDURE — 96372 THER/PROPH/DIAG INJ SC/IM: CPT

## 2020-01-01 PROCEDURE — 85025 COMPLETE CBC W/AUTO DIFF WBC: CPT | Performed by: EMERGENCY MEDICINE

## 2020-01-01 PROCEDURE — 85379 FIBRIN DEGRADATION QUANT: CPT | Performed by: INTERNAL MEDICINE

## 2020-01-01 PROCEDURE — 25800030 ZZH RX IP 258 OP 636: Performed by: INTERNAL MEDICINE

## 2020-01-01 PROCEDURE — 25000125 ZZHC RX 250: Performed by: HOSPITALIST

## 2020-01-01 PROCEDURE — 85610 PROTHROMBIN TIME: CPT | Performed by: EMERGENCY MEDICINE

## 2020-01-01 PROCEDURE — 83735 ASSAY OF MAGNESIUM: CPT | Performed by: HOSPITALIST

## 2020-01-01 PROCEDURE — 99309 SBSQ NF CARE MODERATE MDM 30: CPT | Performed by: NURSE PRACTITIONER

## 2020-01-01 PROCEDURE — 99207 ZZC CDG-HISTORY COMP: MEETS EXP. PROBLEM FOCUSED-DOWN CODED LACK OF ROS: CPT | Performed by: INTERNAL MEDICINE

## 2020-01-01 PROCEDURE — 85027 COMPLETE CBC AUTOMATED: CPT | Performed by: HOSPITALIST

## 2020-01-01 PROCEDURE — 74177 CT ABD & PELVIS W/CONTRAST: CPT

## 2020-01-01 PROCEDURE — 25000131 ZZH RX MED GY IP 250 OP 636 PS 637: Mod: GY | Performed by: INTERNAL MEDICINE

## 2020-01-01 PROCEDURE — 92526 ORAL FUNCTION THERAPY: CPT | Mod: GN | Performed by: SPEECH-LANGUAGE PATHOLOGIST

## 2020-01-01 PROCEDURE — 70450 CT HEAD/BRAIN W/O DYE: CPT

## 2020-01-01 PROCEDURE — 96376 TX/PRO/DX INJ SAME DRUG ADON: CPT

## 2020-01-01 PROCEDURE — 25800030 ZZH RX IP 258 OP 636: Performed by: HOSPITALIST

## 2020-01-01 PROCEDURE — 00000146 ZZHCL STATISTIC GLUCOSE BY METER IP

## 2020-01-01 PROCEDURE — 99232 SBSQ HOSP IP/OBS MODERATE 35: CPT | Performed by: PHYSICIAN ASSISTANT

## 2020-01-01 PROCEDURE — 96366 THER/PROPH/DIAG IV INF ADDON: CPT

## 2020-01-01 PROCEDURE — 99221 1ST HOSP IP/OBS SF/LOW 40: CPT | Performed by: PSYCHIATRY & NEUROLOGY

## 2020-01-01 PROCEDURE — 87088 URINE BACTERIA CULTURE: CPT | Performed by: EMERGENCY MEDICINE

## 2020-01-01 PROCEDURE — 80076 HEPATIC FUNCTION PANEL: CPT | Performed by: INTERNAL MEDICINE

## 2020-01-01 PROCEDURE — C9803 HOPD COVID-19 SPEC COLLECT: HCPCS

## 2020-01-01 PROCEDURE — 97530 THERAPEUTIC ACTIVITIES: CPT | Mod: GP | Performed by: PHYSICAL THERAPIST

## 2020-01-01 PROCEDURE — 25500064 ZZH RX 255 OP 636: Performed by: HOSPITALIST

## 2020-01-01 PROCEDURE — 0296T ZIO PATCH HOLTER ADULT PEDIATRIC GREATER THAN 48 HRS: CPT

## 2020-01-01 PROCEDURE — 99225 ZZC SUBSEQUENT OBSERVATION CARE,LEVEL II: CPT | Performed by: STUDENT IN AN ORGANIZED HEALTH CARE EDUCATION/TRAINING PROGRAM

## 2020-01-01 PROCEDURE — 93005 ELECTROCARDIOGRAM TRACING: CPT

## 2020-01-01 PROCEDURE — 40000986 XR SHOULDER LT PORT G/E 2 VW: Mod: LT

## 2020-01-01 PROCEDURE — 25500064 ZZH RX 255 OP 636: Performed by: INTERNAL MEDICINE

## 2020-01-01 PROCEDURE — 87040 BLOOD CULTURE FOR BACTERIA: CPT | Performed by: EMERGENCY MEDICINE

## 2020-01-01 PROCEDURE — 80048 BASIC METABOLIC PNL TOTAL CA: CPT | Performed by: EMERGENCY MEDICINE

## 2020-01-01 PROCEDURE — 99316 NF DSCHRG MGMT 30 MIN+: CPT | Performed by: NURSE PRACTITIONER

## 2020-01-01 PROCEDURE — 97116 GAIT TRAINING THERAPY: CPT | Mod: GP | Performed by: PHYSICAL THERAPIST

## 2020-01-01 PROCEDURE — 97161 PT EVAL LOW COMPLEX 20 MIN: CPT | Mod: GP

## 2020-01-01 PROCEDURE — 97165 OT EVAL LOW COMPLEX 30 MIN: CPT | Mod: GO

## 2020-01-01 PROCEDURE — 12000011 ZZH R&B MS OVERFLOW

## 2020-01-01 PROCEDURE — A9585 GADOBUTROL INJECTION: HCPCS | Performed by: INTERNAL MEDICINE

## 2020-01-01 PROCEDURE — 97530 THERAPEUTIC ACTIVITIES: CPT | Mod: GP

## 2020-01-01 PROCEDURE — 40000061 EEG ROUTINE

## 2020-01-01 PROCEDURE — 85025 COMPLETE CBC W/AUTO DIFF WBC: CPT | Performed by: PHYSICIAN ASSISTANT

## 2020-01-01 PROCEDURE — 20000003 ZZH R&B ICU

## 2020-01-01 PROCEDURE — 99207 ZZC CDG-CODE CATEGORY CHANGED: CPT | Performed by: INTERNAL MEDICINE

## 2020-01-01 PROCEDURE — 87040 BLOOD CULTURE FOR BACTERIA: CPT | Performed by: INTERNAL MEDICINE

## 2020-01-01 PROCEDURE — 99207 ZZC APP CREDIT; MD BILLING SHARED VISIT: CPT | Performed by: NURSE PRACTITIONER

## 2020-01-01 PROCEDURE — 87635 SARS-COV-2 COVID-19 AMP PRB: CPT | Performed by: EMERGENCY MEDICINE

## 2020-01-01 PROCEDURE — 82803 BLOOD GASES ANY COMBINATION: CPT | Performed by: EMERGENCY MEDICINE

## 2020-01-01 PROCEDURE — 40000061 ZZH STATISTIC EEG TIME EA 10 MIN

## 2020-01-01 PROCEDURE — 84145 PROCALCITONIN (PCT): CPT | Performed by: EMERGENCY MEDICINE

## 2020-01-01 PROCEDURE — 87086 URINE CULTURE/COLONY COUNT: CPT | Performed by: EMERGENCY MEDICINE

## 2020-01-01 PROCEDURE — 25000128 H RX IP 250 OP 636: Performed by: EMERGENCY MEDICINE

## 2020-01-01 PROCEDURE — 99220 ZZC INITIAL OBSERVATION CARE,LEVL III: CPT | Performed by: INTERNAL MEDICINE

## 2020-01-01 PROCEDURE — 82550 ASSAY OF CK (CPK): CPT | Performed by: EMERGENCY MEDICINE

## 2020-01-01 PROCEDURE — 83735 ASSAY OF MAGNESIUM: CPT | Performed by: INTERNAL MEDICINE

## 2020-01-01 PROCEDURE — 97161 PT EVAL LOW COMPLEX 20 MIN: CPT | Mod: GP | Performed by: PHYSICAL THERAPIST

## 2020-01-01 PROCEDURE — 99207 ZZC CDG-MDM COMPONENT: MEETS LOW - DOWN CODED: CPT | Performed by: HOSPITALIST

## 2020-01-01 PROCEDURE — 99207 ZZC CDG-CODE CATEGORY CHANGED: CPT | Performed by: PHYSICIAN ASSISTANT

## 2020-01-01 PROCEDURE — 96365 THER/PROPH/DIAG IV INF INIT: CPT | Mod: 59

## 2020-01-01 PROCEDURE — 25000132 ZZH RX MED GY IP 250 OP 250 PS 637: Mod: GY | Performed by: PSYCHIATRY & NEUROLOGY

## 2020-01-01 PROCEDURE — 93325 DOPPLER ECHO COLOR FLOW MAPG: CPT | Mod: 26 | Performed by: INTERNAL MEDICINE

## 2020-01-01 PROCEDURE — 99442 ZZC PHYSICIAN TELEPHONE EVALUATION 11-20 MIN: CPT | Performed by: PHYSICIAN ASSISTANT

## 2020-01-01 PROCEDURE — 25000132 ZZH RX MED GY IP 250 OP 250 PS 637: Mod: GY | Performed by: PHYSICIAN ASSISTANT

## 2020-01-01 PROCEDURE — 25000128 H RX IP 250 OP 636: Performed by: PSYCHIATRY & NEUROLOGY

## 2020-01-01 PROCEDURE — C9254 INJECTION, LACOSAMIDE: HCPCS | Performed by: INTERNAL MEDICINE

## 2020-01-01 PROCEDURE — 99285 EMERGENCY DEPT VISIT HI MDM: CPT | Mod: 25

## 2020-01-01 PROCEDURE — 92523 SPEECH SOUND LANG COMPREHEN: CPT | Mod: GN | Performed by: SPEECH-LANGUAGE PATHOLOGIST

## 2020-01-01 PROCEDURE — 83735 ASSAY OF MAGNESIUM: CPT | Performed by: EMERGENCY MEDICINE

## 2020-01-01 PROCEDURE — 99232 SBSQ HOSP IP/OBS MODERATE 35: CPT | Performed by: INTERNAL MEDICINE

## 2020-01-01 PROCEDURE — 25000128 H RX IP 250 OP 636: Performed by: INTERNAL MEDICINE

## 2020-01-01 PROCEDURE — 36415 COLL VENOUS BLD VENIPUNCTURE: CPT | Performed by: PHYSICIAN ASSISTANT

## 2020-01-01 PROCEDURE — 71101 X-RAY EXAM UNILAT RIBS/CHEST: CPT | Mod: LT

## 2020-01-01 PROCEDURE — 99231 SBSQ HOSP IP/OBS SF/LOW 25: CPT | Performed by: PSYCHIATRY & NEUROLOGY

## 2020-01-01 PROCEDURE — 99292 CRITICAL CARE ADDL 30 MIN: CPT

## 2020-01-01 PROCEDURE — 97535 SELF CARE MNGMENT TRAINING: CPT | Mod: GO

## 2020-01-01 PROCEDURE — 84484 ASSAY OF TROPONIN QUANT: CPT | Performed by: EMERGENCY MEDICINE

## 2020-01-01 PROCEDURE — 71045 X-RAY EXAM CHEST 1 VIEW: CPT

## 2020-01-01 PROCEDURE — 83690 ASSAY OF LIPASE: CPT | Performed by: EMERGENCY MEDICINE

## 2020-01-01 PROCEDURE — 25800030 ZZH RX IP 258 OP 636: Performed by: PHYSICIAN ASSISTANT

## 2020-01-01 PROCEDURE — 93010 ELECTROCARDIOGRAM REPORT: CPT | Performed by: INTERNAL MEDICINE

## 2020-01-01 PROCEDURE — 25000132 ZZH RX MED GY IP 250 OP 250 PS 637: Mod: GY | Performed by: STUDENT IN AN ORGANIZED HEALTH CARE EDUCATION/TRAINING PROGRAM

## 2020-01-01 PROCEDURE — 93306 TTE W/DOPPLER COMPLETE: CPT | Mod: 26 | Performed by: INTERNAL MEDICINE

## 2020-01-01 PROCEDURE — U0003 INFECTIOUS AGENT DETECTION BY NUCLEIC ACID (DNA OR RNA); SEVERE ACUTE RESPIRATORY SYNDROME CORONAVIRUS 2 (SARS-COV-2) (CORONAVIRUS DISEASE [COVID-19]), AMPLIFIED PROBE TECHNIQUE, MAKING USE OF HIGH THROUGHPUT TECHNOLOGIES AS DESCRIBED BY CMS-2020-01-R: HCPCS | Performed by: EMERGENCY MEDICINE

## 2020-01-01 PROCEDURE — 97116 GAIT TRAINING THERAPY: CPT | Mod: GP

## 2020-01-01 PROCEDURE — 0298T ZIO PATCH HOLTER ADULT PEDIATRIC GREATER THAN 48 HRS: CPT | Performed by: INTERNAL MEDICINE

## 2020-01-01 PROCEDURE — 0298T LEADLESS EKG MONITOR 3 TO 14 DAYS: CPT | Performed by: INTERNAL MEDICINE

## 2020-01-01 PROCEDURE — 99305 1ST NF CARE MODERATE MDM 35: CPT | Mod: AI | Performed by: INTERNAL MEDICINE

## 2020-01-01 PROCEDURE — 82550 ASSAY OF CK (CPK): CPT | Performed by: HOSPITALIST

## 2020-01-01 PROCEDURE — 96374 THER/PROPH/DIAG INJ IV PUSH: CPT

## 2020-01-01 PROCEDURE — 80177 DRUG SCRN QUAN LEVETIRACETAM: CPT | Performed by: INTERNAL MEDICINE

## 2020-01-01 PROCEDURE — 25000131 ZZH RX MED GY IP 250 OP 636 PS 637: Mod: GY | Performed by: PHYSICIAN ASSISTANT

## 2020-01-01 PROCEDURE — 70553 MRI BRAIN STEM W/O & W/DYE: CPT

## 2020-01-01 PROCEDURE — 99309 SBSQ NF CARE MODERATE MDM 30: CPT | Mod: 95 | Performed by: NURSE PRACTITIONER

## 2020-01-01 PROCEDURE — 99207 ZZC CDG-MDM COMPONENT: MEETS LOW - DOWN CODED: CPT | Performed by: NURSE PRACTITIONER

## 2020-01-01 PROCEDURE — 92507 TX SP LANG VOICE COMM INDIV: CPT | Mod: GN | Performed by: SPEECH-LANGUAGE PATHOLOGIST

## 2020-01-01 PROCEDURE — 4A10X4Z MONITORING OF CENTRAL NERVOUS ELECTRICAL ACTIVITY, EXTERNAL APPROACH: ICD-10-PCS | Performed by: PSYCHIATRY & NEUROLOGY

## 2020-01-01 PROCEDURE — 97535 SELF CARE MNGMENT TRAINING: CPT | Mod: GO | Performed by: OCCUPATIONAL THERAPIST

## 2020-01-01 PROCEDURE — 99239 HOSP IP/OBS DSCHRG MGMT >30: CPT | Performed by: INTERNAL MEDICINE

## 2020-01-01 PROCEDURE — 80053 COMPREHEN METABOLIC PANEL: CPT | Performed by: EMERGENCY MEDICINE

## 2020-01-01 PROCEDURE — 97110 THERAPEUTIC EXERCISES: CPT | Mod: GP

## 2020-01-01 PROCEDURE — 71046 X-RAY EXAM CHEST 2 VIEWS: CPT

## 2020-01-01 PROCEDURE — 99207 ZZC CDG-CODE CATEGORY CHANGED: CPT | Performed by: NURSE PRACTITIONER

## 2020-01-01 PROCEDURE — 25000125 ZZHC RX 250: Performed by: INTERNAL MEDICINE

## 2020-01-01 PROCEDURE — 99310 SBSQ NF CARE HIGH MDM 45: CPT | Performed by: NURSE PRACTITIONER

## 2020-01-01 PROCEDURE — 82746 ASSAY OF FOLIC ACID SERUM: CPT | Performed by: INTERNAL MEDICINE

## 2020-01-01 PROCEDURE — 85018 HEMOGLOBIN: CPT | Performed by: HOSPITALIST

## 2020-01-01 PROCEDURE — 99238 HOSP IP/OBS DSCHRG MGMT 30/<: CPT | Performed by: PHYSICIAN ASSISTANT

## 2020-01-01 PROCEDURE — 93308 TTE F-UP OR LMTD: CPT

## 2020-01-01 PROCEDURE — 97530 THERAPEUTIC ACTIVITIES: CPT | Mod: GO | Performed by: OCCUPATIONAL THERAPY ASSISTANT

## 2020-01-01 PROCEDURE — 99217 ZZC OBSERVATION CARE DISCHARGE: CPT | Performed by: STUDENT IN AN ORGANIZED HEALTH CARE EDUCATION/TRAINING PROGRAM

## 2020-01-01 PROCEDURE — 96365 THER/PROPH/DIAG IV INF INIT: CPT

## 2020-01-01 PROCEDURE — 93308 TTE F-UP OR LMTD: CPT | Mod: 26 | Performed by: INTERNAL MEDICINE

## 2020-01-01 PROCEDURE — 0042T CT HEAD PERFUSION WITH CONTRAST: CPT

## 2020-01-01 PROCEDURE — 70496 CT ANGIOGRAPHY HEAD: CPT

## 2020-01-01 PROCEDURE — 83036 HEMOGLOBIN GLYCOSYLATED A1C: CPT | Performed by: HOSPITALIST

## 2020-01-01 PROCEDURE — 84100 ASSAY OF PHOSPHORUS: CPT | Performed by: HOSPITALIST

## 2020-01-01 PROCEDURE — 99214 OFFICE O/P EST MOD 30 MIN: CPT | Performed by: INTERNAL MEDICINE

## 2020-01-01 PROCEDURE — 99220 ZZC INITIAL OBSERVATION CARE,LEVL III: CPT | Performed by: HOSPITALIST

## 2020-01-01 PROCEDURE — 97530 THERAPEUTIC ACTIVITIES: CPT | Mod: GO | Performed by: OCCUPATIONAL THERAPIST

## 2020-01-01 PROCEDURE — 25800030 ZZH RX IP 258 OP 636: Performed by: PSYCHIATRY & NEUROLOGY

## 2020-01-01 PROCEDURE — G0407 INPT/TELE FOLLOW UP 25: HCPCS | Mod: G0 | Performed by: PSYCHIATRY & NEUROLOGY

## 2020-01-01 PROCEDURE — 84100 ASSAY OF PHOSPHORUS: CPT | Performed by: INTERNAL MEDICINE

## 2020-01-01 PROCEDURE — 99443: CPT | Performed by: INTERNAL MEDICINE

## 2020-01-01 PROCEDURE — 80048 BASIC METABOLIC PNL TOTAL CA: CPT | Performed by: HOSPITALIST

## 2020-01-01 PROCEDURE — 99207 C FOOT EXAM  NO CHARGE: CPT | Performed by: INTERNAL MEDICINE

## 2020-01-01 PROCEDURE — 83605 ASSAY OF LACTIC ACID: CPT | Performed by: INTERNAL MEDICINE

## 2020-01-01 PROCEDURE — 97166 OT EVAL MOD COMPLEX 45 MIN: CPT | Mod: GO | Performed by: OCCUPATIONAL THERAPIST

## 2020-01-01 PROCEDURE — 85018 HEMOGLOBIN: CPT | Performed by: INTERNAL MEDICINE

## 2020-01-01 PROCEDURE — 21000001 ZZH R&B HEART CARE

## 2020-01-01 PROCEDURE — 70551 MRI BRAIN STEM W/O DYE: CPT

## 2020-01-01 PROCEDURE — 95812 EEG 41-60 MINUTES: CPT

## 2020-01-01 PROCEDURE — 93321 DOPPLER ECHO F-UP/LMTD STD: CPT | Mod: 26 | Performed by: INTERNAL MEDICINE

## 2020-01-01 PROCEDURE — 72125 CT NECK SPINE W/O DYE: CPT

## 2020-01-01 PROCEDURE — 92610 EVALUATE SWALLOWING FUNCTION: CPT | Mod: GN

## 2020-01-01 PROCEDURE — 84484 ASSAY OF TROPONIN QUANT: CPT | Performed by: INTERNAL MEDICINE

## 2020-01-01 PROCEDURE — G0426 INPT/ED TELECONSULT50: HCPCS | Mod: G0 | Performed by: PSYCHIATRY & NEUROLOGY

## 2020-01-01 PROCEDURE — 81001 URINALYSIS AUTO W/SCOPE: CPT | Performed by: STUDENT IN AN ORGANIZED HEALTH CARE EDUCATION/TRAINING PROGRAM

## 2020-01-01 PROCEDURE — 73030 X-RAY EXAM OF SHOULDER: CPT | Mod: LT

## 2020-01-01 PROCEDURE — 80061 LIPID PANEL: CPT | Performed by: INTERNAL MEDICINE

## 2020-01-01 PROCEDURE — 99233 SBSQ HOSP IP/OBS HIGH 50: CPT | Performed by: INTERNAL MEDICINE

## 2020-01-01 PROCEDURE — C9254 INJECTION, LACOSAMIDE: HCPCS | Performed by: PHYSICIAN ASSISTANT

## 2020-01-01 PROCEDURE — 0296T LEADLESS EKG MONITOR 3 TO 14 DAYS: CPT

## 2020-01-01 PROCEDURE — 92526 ORAL FUNCTION THERAPY: CPT | Mod: GN

## 2020-01-01 PROCEDURE — 25800025 ZZH RX 258: Performed by: INTERNAL MEDICINE

## 2020-01-01 PROCEDURE — 96361 HYDRATE IV INFUSION ADD-ON: CPT

## 2020-01-01 PROCEDURE — 92610 EVALUATE SWALLOWING FUNCTION: CPT | Mod: GN | Performed by: SPEECH-LANGUAGE PATHOLOGIST

## 2020-01-01 PROCEDURE — G0427 INPT/ED TELECONSULT70: HCPCS | Mod: G0 | Performed by: PSYCHIATRY & NEUROLOGY

## 2020-01-01 PROCEDURE — 96375 TX/PRO/DX INJ NEW DRUG ADDON: CPT

## 2020-01-01 PROCEDURE — 70498 CT ANGIOGRAPHY NECK: CPT

## 2020-01-01 PROCEDURE — U0003 INFECTIOUS AGENT DETECTION BY NUCLEIC ACID (DNA OR RNA); SEVERE ACUTE RESPIRATORY SYNDROME CORONAVIRUS 2 (SARS-COV-2) (CORONAVIRUS DISEASE [COVID-19]), AMPLIFIED PROBE TECHNIQUE, MAKING USE OF HIGH THROUGHPUT TECHNOLOGIES AS DESCRIBED BY CMS-2020-01-R: HCPCS | Performed by: INTERNAL MEDICINE

## 2020-01-01 PROCEDURE — 99308 SBSQ NF CARE LOW MDM 20: CPT | Performed by: NURSE PRACTITIONER

## 2020-01-01 PROCEDURE — 99291 CRITICAL CARE FIRST HOUR: CPT | Mod: 25

## 2020-01-01 PROCEDURE — 80048 BASIC METABOLIC PNL TOTAL CA: CPT | Performed by: PHYSICIAN ASSISTANT

## 2020-01-01 PROCEDURE — 99222 1ST HOSP IP/OBS MODERATE 55: CPT | Performed by: INTERNAL MEDICINE

## 2020-01-01 PROCEDURE — 83615 LACTATE (LD) (LDH) ENZYME: CPT | Performed by: INTERNAL MEDICINE

## 2020-01-01 PROCEDURE — 83605 ASSAY OF LACTIC ACID: CPT | Performed by: EMERGENCY MEDICINE

## 2020-01-01 PROCEDURE — 99207 ZZC CDG-CODE CATEGORY CHANGED: CPT | Performed by: STUDENT IN AN ORGANIZED HEALTH CARE EDUCATION/TRAINING PROGRAM

## 2020-01-01 PROCEDURE — 97110 THERAPEUTIC EXERCISES: CPT | Mod: GP | Performed by: PHYSICAL THERAPIST

## 2020-01-01 PROCEDURE — 99238 HOSP IP/OBS DSCHRG MGMT 30/<: CPT | Performed by: NURSE PRACTITIONER

## 2020-01-01 PROCEDURE — 74176 CT ABD & PELVIS W/O CONTRAST: CPT

## 2020-01-01 PROCEDURE — 86140 C-REACTIVE PROTEIN: CPT | Performed by: EMERGENCY MEDICINE

## 2020-01-01 PROCEDURE — 85730 THROMBOPLASTIN TIME PARTIAL: CPT | Performed by: EMERGENCY MEDICINE

## 2020-01-01 PROCEDURE — 80177 DRUG SCRN QUAN LEVETIRACETAM: CPT | Performed by: EMERGENCY MEDICINE

## 2020-01-01 PROCEDURE — 87635 SARS-COV-2 COVID-19 AMP PRB: CPT | Performed by: INTERNAL MEDICINE

## 2020-01-01 PROCEDURE — 99238 HOSP IP/OBS DSCHRG MGMT 30/<: CPT | Performed by: INTERNAL MEDICINE

## 2020-01-01 PROCEDURE — 85025 COMPLETE CBC W/AUTO DIFF WBC: CPT | Performed by: HOSPITALIST

## 2020-01-01 PROCEDURE — 96374 THER/PROPH/DIAG INJ IV PUSH: CPT | Mod: 59

## 2020-01-01 PROCEDURE — 96360 HYDRATION IV INFUSION INIT: CPT | Mod: 59

## 2020-01-01 PROCEDURE — 99217 ZZC OBSERVATION CARE DISCHARGE: CPT | Performed by: INTERNAL MEDICINE

## 2020-01-01 PROCEDURE — 99207 ZZC CDG-MDM COMPONENT: MEETS MODERATE - UP CODED: CPT | Performed by: HOSPITALIST

## 2020-01-01 PROCEDURE — 99223 1ST HOSP IP/OBS HIGH 75: CPT | Performed by: NURSE PRACTITIONER

## 2020-01-01 PROCEDURE — 99218 ZZC INITIAL OBSERVATION CARE,LEVL I: CPT | Performed by: INTERNAL MEDICINE

## 2020-01-01 PROCEDURE — 96372 THER/PROPH/DIAG INJ SC/IM: CPT | Mod: XS

## 2020-01-01 PROCEDURE — 97112 NEUROMUSCULAR REEDUCATION: CPT | Mod: GP

## 2020-01-01 PROCEDURE — 83036 HEMOGLOBIN GLYCOSYLATED A1C: CPT | Performed by: INTERNAL MEDICINE

## 2020-01-01 PROCEDURE — 72192 CT PELVIS W/O DYE: CPT

## 2020-01-01 PROCEDURE — 99305 1ST NF CARE MODERATE MDM 35: CPT | Performed by: INTERNAL MEDICINE

## 2020-01-01 PROCEDURE — 87186 SC STD MICRODIL/AGAR DIL: CPT | Performed by: EMERGENCY MEDICINE

## 2020-01-01 PROCEDURE — 23650 CLTX SHO DSLC W/MNPJ WO ANES: CPT | Mod: LT

## 2020-01-01 PROCEDURE — 80053 COMPREHEN METABOLIC PANEL: CPT | Performed by: INTERNAL MEDICINE

## 2020-01-01 PROCEDURE — A9585 GADOBUTROL INJECTION: HCPCS | Performed by: HOSPITALIST

## 2020-01-01 PROCEDURE — 84484 ASSAY OF TROPONIN QUANT: CPT | Mod: 59 | Performed by: EMERGENCY MEDICINE

## 2020-01-01 PROCEDURE — 25000125 ZZHC RX 250: Performed by: STUDENT IN AN ORGANIZED HEALTH CARE EDUCATION/TRAINING PROGRAM

## 2020-01-01 PROCEDURE — 99441 ZZC PHYSICIAN TELEPHONE EVALUATION 5-10 MIN: CPT | Performed by: PHYSICIAN ASSISTANT

## 2020-01-01 PROCEDURE — 93306 TTE W/DOPPLER COMPLETE: CPT

## 2020-01-01 PROCEDURE — 95816 EEG AWAKE AND DROWSY: CPT

## 2020-01-01 PROCEDURE — 97535 SELF CARE MNGMENT TRAINING: CPT | Mod: GO | Performed by: OCCUPATIONAL THERAPY ASSISTANT

## 2020-01-01 PROCEDURE — 82607 VITAMIN B-12: CPT | Performed by: INTERNAL MEDICINE

## 2020-01-01 RX ORDER — LORAZEPAM 0.5 MG/1
.5-1 TABLET ORAL
Status: DISCONTINUED | OUTPATIENT
Start: 2020-01-01 | End: 2020-01-01 | Stop reason: HOSPADM

## 2020-01-01 RX ORDER — TAMSULOSIN HYDROCHLORIDE 0.4 MG/1
0.4 CAPSULE ORAL DAILY
Qty: 30 CAPSULE | Refills: 0 | Status: SHIPPED | OUTPATIENT
Start: 2020-01-01 | End: 2020-01-01

## 2020-01-01 RX ORDER — BISACODYL 10 MG
10 SUPPOSITORY, RECTAL RECTAL DAILY PRN
Status: DISCONTINUED | OUTPATIENT
Start: 2020-01-01 | End: 2020-01-01 | Stop reason: HOSPADM

## 2020-01-01 RX ORDER — DULOXETIN HYDROCHLORIDE 30 MG/1
30 CAPSULE, DELAYED RELEASE ORAL DAILY
Status: DISCONTINUED | OUTPATIENT
Start: 2020-01-01 | End: 2020-01-01 | Stop reason: HOSPADM

## 2020-01-01 RX ORDER — DEXTROSE MONOHYDRATE 25 G/50ML
25-50 INJECTION, SOLUTION INTRAVENOUS
Status: DISCONTINUED | OUTPATIENT
Start: 2020-01-01 | End: 2020-01-01 | Stop reason: HOSPADM

## 2020-01-01 RX ORDER — NICOTINE POLACRILEX 4 MG
15-30 LOZENGE BUCCAL
Status: DISCONTINUED | OUTPATIENT
Start: 2020-01-01 | End: 2020-01-01 | Stop reason: HOSPADM

## 2020-01-01 RX ORDER — AMOXICILLIN 250 MG
1 CAPSULE ORAL 2 TIMES DAILY PRN
Status: DISCONTINUED | OUTPATIENT
Start: 2020-01-01 | End: 2020-01-01 | Stop reason: HOSPADM

## 2020-01-01 RX ORDER — POTASSIUM CHLORIDE 29.8 MG/ML
20 INJECTION INTRAVENOUS
Status: DISCONTINUED | OUTPATIENT
Start: 2020-01-01 | End: 2020-01-01

## 2020-01-01 RX ORDER — ACETAMINOPHEN 325 MG/1
650 TABLET ORAL EVERY 4 HOURS PRN
Status: DISCONTINUED | OUTPATIENT
Start: 2020-01-01 | End: 2020-01-01 | Stop reason: HOSPADM

## 2020-01-01 RX ORDER — INSULIN ASPART 100 [IU]/ML
INJECTION, SOLUTION INTRAVENOUS; SUBCUTANEOUS
Qty: 15 ML | Refills: 0 | DISCHARGE
Start: 2020-01-01 | End: 2020-01-01

## 2020-01-01 RX ORDER — ACETAMINOPHEN 650 MG/1
650 SUPPOSITORY RECTAL EVERY 4 HOURS PRN
Status: DISCONTINUED | OUTPATIENT
Start: 2020-01-01 | End: 2020-01-01 | Stop reason: HOSPADM

## 2020-01-01 RX ORDER — NITROGLYCERIN 0.4 MG/1
0.4 TABLET SUBLINGUAL EVERY 5 MIN PRN
Status: DISCONTINUED | OUTPATIENT
Start: 2020-01-01 | End: 2020-01-01

## 2020-01-01 RX ORDER — LACOSAMIDE 100 MG/1
100 TABLET ORAL 2 TIMES DAILY
Qty: 60 TABLET | Refills: 0 | Status: SHIPPED | OUTPATIENT
Start: 2020-01-01

## 2020-01-01 RX ORDER — SODIUM CHLORIDE 9 MG/ML
INJECTION, SOLUTION INTRAVENOUS CONTINUOUS
Status: DISCONTINUED | OUTPATIENT
Start: 2020-01-01 | End: 2020-01-01

## 2020-01-01 RX ORDER — OXYCODONE HYDROCHLORIDE 5 MG/1
2.5 TABLET ORAL
Qty: 25 TABLET | Refills: 0 | Status: SHIPPED | OUTPATIENT
Start: 2020-01-01 | End: 2020-01-01

## 2020-01-01 RX ORDER — LACOSAMIDE 100 MG/1
100 TABLET ORAL 2 TIMES DAILY
Status: SHIPPED | DISCHARGE
Start: 2020-01-01 | End: 2020-01-01

## 2020-01-01 RX ORDER — ASPIRIN 300 MG/1
300 SUPPOSITORY RECTAL ONCE
Status: COMPLETED | OUTPATIENT
Start: 2020-01-01 | End: 2020-01-01

## 2020-01-01 RX ORDER — GLYCOPYRROLATE 0.2 MG/ML
.2-.4 INJECTION, SOLUTION INTRAMUSCULAR; INTRAVENOUS EVERY 4 HOURS PRN
Status: DISCONTINUED | OUTPATIENT
Start: 2020-01-01 | End: 2020-01-01 | Stop reason: HOSPADM

## 2020-01-01 RX ORDER — LACOSAMIDE 100 MG/1
100 TABLET ORAL 2 TIMES DAILY
Qty: 60 TABLET | Refills: 0 | Status: ON HOLD | OUTPATIENT
Start: 2020-01-01 | End: 2020-01-01

## 2020-01-01 RX ORDER — IOPAMIDOL 755 MG/ML
70 INJECTION, SOLUTION INTRAVASCULAR ONCE
Status: COMPLETED | OUTPATIENT
Start: 2020-01-01 | End: 2020-01-01

## 2020-01-01 RX ORDER — IPRATROPIUM BROMIDE 21 UG/1
1 SPRAY, METERED NASAL 2 TIMES DAILY
Status: DISCONTINUED | OUTPATIENT
Start: 2020-01-01 | End: 2020-01-01 | Stop reason: HOSPADM

## 2020-01-01 RX ORDER — ONDANSETRON 2 MG/ML
4 INJECTION INTRAMUSCULAR; INTRAVENOUS EVERY 6 HOURS PRN
Status: DISCONTINUED | OUTPATIENT
Start: 2020-01-01 | End: 2020-01-01 | Stop reason: HOSPADM

## 2020-01-01 RX ORDER — ATORVASTATIN CALCIUM 40 MG/1
40 TABLET, FILM COATED ORAL DAILY
Status: DISCONTINUED | OUTPATIENT
Start: 2020-01-01 | End: 2020-01-01 | Stop reason: HOSPADM

## 2020-01-01 RX ORDER — BISACODYL 10 MG
10 SUPPOSITORY, RECTAL RECTAL DAILY PRN
Qty: 4 SUPPOSITORY | Refills: 11 | Status: SHIPPED | OUTPATIENT
Start: 2020-01-01 | End: 2020-01-01

## 2020-01-01 RX ORDER — LEVETIRACETAM 250 MG/1
750 TABLET ORAL 2 TIMES DAILY
Status: DISCONTINUED | OUTPATIENT
Start: 2020-01-01 | End: 2020-01-01

## 2020-01-01 RX ORDER — MAGNESIUM OXIDE 400 MG/1
400 TABLET ORAL
COMMUNITY

## 2020-01-01 RX ORDER — SODIUM CHLORIDE AND POTASSIUM CHLORIDE 150; 900 MG/100ML; MG/100ML
INJECTION, SOLUTION INTRAVENOUS CONTINUOUS
Status: DISCONTINUED | OUTPATIENT
Start: 2020-01-01 | End: 2020-01-01

## 2020-01-01 RX ORDER — NICOTINE POLACRILEX 4 MG
15-30 LOZENGE BUCCAL
Status: DISCONTINUED | OUTPATIENT
Start: 2020-01-01 | End: 2020-01-01

## 2020-01-01 RX ORDER — IOPAMIDOL 755 MG/ML
120 INJECTION, SOLUTION INTRAVASCULAR ONCE
Status: COMPLETED | OUTPATIENT
Start: 2020-01-01 | End: 2020-01-01

## 2020-01-01 RX ORDER — SENNOSIDES 8.6 MG
2 TABLET ORAL DAILY
Status: DISCONTINUED | OUTPATIENT
Start: 2020-01-01 | End: 2020-01-01 | Stop reason: HOSPADM

## 2020-01-01 RX ORDER — HYDROMORPHONE HYDROCHLORIDE 1 MG/ML
0.2 INJECTION, SOLUTION INTRAMUSCULAR; INTRAVENOUS; SUBCUTANEOUS
Status: DISCONTINUED | OUTPATIENT
Start: 2020-01-01 | End: 2020-01-01 | Stop reason: HOSPADM

## 2020-01-01 RX ORDER — LACOSAMIDE 50 MG/1
100 TABLET ORAL 2 TIMES DAILY
Status: DISCONTINUED | OUTPATIENT
Start: 2020-01-01 | End: 2020-01-01 | Stop reason: HOSPADM

## 2020-01-01 RX ORDER — NALOXONE HYDROCHLORIDE 0.4 MG/ML
.1-.4 INJECTION, SOLUTION INTRAMUSCULAR; INTRAVENOUS; SUBCUTANEOUS
Status: DISCONTINUED | OUTPATIENT
Start: 2020-01-01 | End: 2020-01-01

## 2020-01-01 RX ORDER — LABETALOL HYDROCHLORIDE 5 MG/ML
10-40 INJECTION, SOLUTION INTRAVENOUS EVERY 10 MIN PRN
Status: DISCONTINUED | OUTPATIENT
Start: 2020-01-01 | End: 2020-01-01 | Stop reason: HOSPADM

## 2020-01-01 RX ORDER — POLYETHYLENE GLYCOL 3350 17 G/17G
17 POWDER, FOR SOLUTION ORAL DAILY PRN
Status: DISCONTINUED | OUTPATIENT
Start: 2020-01-01 | End: 2020-01-01 | Stop reason: HOSPADM

## 2020-01-01 RX ORDER — ZONISAMIDE 100 MG/1
100 CAPSULE ORAL AT BEDTIME
Status: DISCONTINUED | OUTPATIENT
Start: 2020-01-01 | End: 2020-01-01

## 2020-01-01 RX ORDER — SENNOSIDES 8.6 MG
2 TABLET ORAL DAILY PRN
Status: DISCONTINUED | OUTPATIENT
Start: 2020-01-01 | End: 2020-01-01 | Stop reason: HOSPADM

## 2020-01-01 RX ORDER — ONDANSETRON 2 MG/ML
INJECTION INTRAMUSCULAR; INTRAVENOUS
Status: DISCONTINUED
Start: 2020-01-01 | End: 2020-01-01 | Stop reason: HOSPADM

## 2020-01-01 RX ORDER — HYDRALAZINE HYDROCHLORIDE 20 MG/ML
10 INJECTION INTRAMUSCULAR; INTRAVENOUS EVERY 4 HOURS PRN
Status: DISCONTINUED | OUTPATIENT
Start: 2020-01-01 | End: 2020-01-01 | Stop reason: HOSPADM

## 2020-01-01 RX ORDER — ONDANSETRON 4 MG/1
4 TABLET, FILM COATED ORAL EVERY 8 HOURS PRN
Status: DISCONTINUED | OUTPATIENT
Start: 2020-01-01 | End: 2020-01-01 | Stop reason: HOSPADM

## 2020-01-01 RX ORDER — MAGNESIUM OXIDE 400 MG/1
400 TABLET ORAL
Status: DISCONTINUED | OUTPATIENT
Start: 2020-01-01 | End: 2020-01-01

## 2020-01-01 RX ORDER — LORAZEPAM 2 MG/ML
.5-1 INJECTION INTRAMUSCULAR
Status: DISCONTINUED | OUTPATIENT
Start: 2020-01-01 | End: 2020-01-01 | Stop reason: HOSPADM

## 2020-01-01 RX ORDER — LEVETIRACETAM 750 MG/1
750 TABLET ORAL 2 TIMES DAILY
Status: DISCONTINUED | OUTPATIENT
Start: 2020-01-01 | End: 2020-01-01 | Stop reason: HOSPADM

## 2020-01-01 RX ORDER — AMOXICILLIN 250 MG
2 CAPSULE ORAL 2 TIMES DAILY PRN
Status: DISCONTINUED | OUTPATIENT
Start: 2020-01-01 | End: 2020-01-01

## 2020-01-01 RX ORDER — LACOSAMIDE 100 MG/1
100 TABLET ORAL 2 TIMES DAILY
Status: DISCONTINUED | OUTPATIENT
Start: 2020-01-01 | End: 2020-01-01 | Stop reason: HOSPADM

## 2020-01-01 RX ORDER — LEVETIRACETAM 750 MG/1
750 TABLET ORAL 2 TIMES DAILY
Qty: 60 TABLET | Refills: 0 | Status: SHIPPED | OUTPATIENT
Start: 2020-01-01

## 2020-01-01 RX ORDER — ASPIRIN 81 MG/1
81 TABLET ORAL DAILY
Status: DISCONTINUED | OUTPATIENT
Start: 2020-01-01 | End: 2020-01-01 | Stop reason: HOSPADM

## 2020-01-01 RX ORDER — QUETIAPINE FUMARATE 25 MG/1
25 TABLET, FILM COATED ORAL EVERY EVENING
Status: DISCONTINUED | OUTPATIENT
Start: 2020-01-01 | End: 2020-01-01

## 2020-01-01 RX ORDER — ACETAMINOPHEN 500 MG
1000 TABLET ORAL 2 TIMES DAILY
Status: DISCONTINUED | OUTPATIENT
Start: 2020-01-01 | End: 2020-01-01 | Stop reason: HOSPADM

## 2020-01-01 RX ORDER — DULOXETIN HYDROCHLORIDE 30 MG/1
30 CAPSULE, DELAYED RELEASE ORAL DAILY
Status: DISCONTINUED | OUTPATIENT
Start: 2020-01-01 | End: 2020-01-01

## 2020-01-01 RX ORDER — CEFTRIAXONE 2 G/1
2 INJECTION, POWDER, FOR SOLUTION INTRAMUSCULAR; INTRAVENOUS ONCE
Status: COMPLETED | OUTPATIENT
Start: 2020-01-01 | End: 2020-01-01

## 2020-01-01 RX ORDER — TAMSULOSIN HYDROCHLORIDE 0.4 MG/1
0.4 CAPSULE ORAL EVERY EVENING
Status: DISCONTINUED | OUTPATIENT
Start: 2020-01-01 | End: 2020-01-01 | Stop reason: HOSPADM

## 2020-01-01 RX ORDER — LISINOPRIL 5 MG/1
5 TABLET ORAL DAILY
DISCHARGE
Start: 2020-01-01 | End: 2020-01-01

## 2020-01-01 RX ORDER — BISACODYL 10 MG
10 SUPPOSITORY, RECTAL RECTAL
Status: DISCONTINUED | OUTPATIENT
Start: 2020-08-12 | End: 2020-01-01 | Stop reason: HOSPADM

## 2020-01-01 RX ORDER — TAMSULOSIN HYDROCHLORIDE 0.4 MG/1
0.4 CAPSULE ORAL DAILY
Status: DISCONTINUED | OUTPATIENT
Start: 2020-01-01 | End: 2020-01-01 | Stop reason: HOSPADM

## 2020-01-01 RX ORDER — ATROPINE SULFATE 0.1 MG/ML
0.5 INJECTION INTRAVENOUS ONCE
Status: DISCONTINUED | OUTPATIENT
Start: 2020-01-01 | End: 2020-01-01

## 2020-01-01 RX ORDER — HALOPERIDOL 0.5 MG/1
0.5 TABLET ORAL EVERY 8 HOURS PRN
Status: DISCONTINUED | OUTPATIENT
Start: 2020-01-01 | End: 2020-01-01

## 2020-01-01 RX ORDER — PROCHLORPERAZINE MALEATE 5 MG
5 TABLET ORAL EVERY 6 HOURS PRN
Status: DISCONTINUED | OUTPATIENT
Start: 2020-01-01 | End: 2020-01-01 | Stop reason: HOSPADM

## 2020-01-01 RX ORDER — LEVETIRACETAM 10 MG/ML
1000 INJECTION INTRAVASCULAR ONCE
Status: COMPLETED | OUTPATIENT
Start: 2020-01-01 | End: 2020-01-01

## 2020-01-01 RX ORDER — LEVETIRACETAM 750 MG/1
750 TABLET ORAL AT BEDTIME
Status: DISCONTINUED | OUTPATIENT
Start: 2020-01-01 | End: 2020-01-01

## 2020-01-01 RX ORDER — SODIUM CHLORIDE 9 MG/ML
INJECTION, SOLUTION INTRAVENOUS CONTINUOUS
Status: ACTIVE | OUTPATIENT
Start: 2020-01-01 | End: 2020-01-01

## 2020-01-01 RX ORDER — CARBOXYMETHYLCELLULOSE SODIUM 5 MG/ML
1-2 SOLUTION/ DROPS OPHTHALMIC EVERY 8 HOURS PRN
Status: DISCONTINUED | OUTPATIENT
Start: 2020-01-01 | End: 2020-01-01 | Stop reason: HOSPADM

## 2020-01-01 RX ORDER — POLYETHYLENE GLYCOL 3350 17 G/17G
1 POWDER, FOR SOLUTION ORAL 2 TIMES DAILY
Status: ON HOLD | COMMUNITY
Start: 2020-01-01 | End: 2020-01-01

## 2020-01-01 RX ORDER — BISACODYL 10 MG
10 SUPPOSITORY, RECTAL RECTAL DAILY PRN
Status: DISCONTINUED | OUTPATIENT
Start: 2020-01-01 | End: 2020-01-01

## 2020-01-01 RX ORDER — LEVETIRACETAM 500 MG/1
1000 TABLET ORAL 2 TIMES DAILY
Status: DISCONTINUED | OUTPATIENT
Start: 2020-01-01 | End: 2020-01-01

## 2020-01-01 RX ORDER — OMEPRAZOLE 40 MG/1
CAPSULE, DELAYED RELEASE ORAL
Qty: 30 CAPSULE | Refills: 0 | Status: SHIPPED | OUTPATIENT
Start: 2020-01-01

## 2020-01-01 RX ORDER — GADOBUTROL 604.72 MG/ML
5 INJECTION INTRAVENOUS ONCE
Status: COMPLETED | OUTPATIENT
Start: 2020-01-01 | End: 2020-01-01

## 2020-01-01 RX ORDER — PROCHLORPERAZINE 25 MG
12.5 SUPPOSITORY, RECTAL RECTAL EVERY 12 HOURS PRN
Status: DISCONTINUED | OUTPATIENT
Start: 2020-01-01 | End: 2020-01-01

## 2020-01-01 RX ORDER — PIPERACILLIN SODIUM, TAZOBACTAM SODIUM 3; .375 G/15ML; G/15ML
3.38 INJECTION, POWDER, LYOPHILIZED, FOR SOLUTION INTRAVENOUS ONCE
Status: COMPLETED | OUTPATIENT
Start: 2020-01-01 | End: 2020-01-01

## 2020-01-01 RX ORDER — NALOXONE HYDROCHLORIDE 0.4 MG/ML
.1-.4 INJECTION, SOLUTION INTRAMUSCULAR; INTRAVENOUS; SUBCUTANEOUS
Status: DISCONTINUED | OUTPATIENT
Start: 2020-01-01 | End: 2020-01-01 | Stop reason: HOSPADM

## 2020-01-01 RX ORDER — PREDNISOLONE ACETATE 10 MG/ML
1 SUSPENSION/ DROPS OPHTHALMIC EVERY OTHER DAY
COMMUNITY
Start: 2020-01-01 | End: 2020-01-01

## 2020-01-01 RX ORDER — IPRATROPIUM BROMIDE 21 UG/1
SPRAY, METERED NASAL
Qty: 30 ML | Refills: 11 | Status: SHIPPED | OUTPATIENT
Start: 2020-01-01

## 2020-01-01 RX ORDER — ONDANSETRON 2 MG/ML
4 INJECTION INTRAMUSCULAR; INTRAVENOUS EVERY 6 HOURS PRN
Status: DISCONTINUED | OUTPATIENT
Start: 2020-01-01 | End: 2020-01-01

## 2020-01-01 RX ORDER — SODIUM CHLORIDE 9 MG/ML
INJECTION, SOLUTION INTRAVENOUS ONCE
Status: DISCONTINUED | OUTPATIENT
Start: 2020-01-01 | End: 2020-01-01

## 2020-01-01 RX ORDER — AMOXICILLIN 250 MG
1 CAPSULE ORAL 2 TIMES DAILY
Status: DISCONTINUED | OUTPATIENT
Start: 2020-01-01 | End: 2020-01-01

## 2020-01-01 RX ORDER — OMEPRAZOLE 40 MG/1
CAPSULE, DELAYED RELEASE ORAL
Qty: 90 CAPSULE | Refills: 0 | Status: SHIPPED | OUTPATIENT
Start: 2020-01-01 | End: 2020-01-01

## 2020-01-01 RX ORDER — THIAMINE HYDROCHLORIDE 100 MG/ML
100 INJECTION, SOLUTION INTRAMUSCULAR; INTRAVENOUS DAILY
Status: DISCONTINUED | OUTPATIENT
Start: 2020-01-01 | End: 2020-01-01

## 2020-01-01 RX ORDER — SODIUM CHLORIDE 9 MG/ML
INJECTION, SOLUTION INTRAVENOUS CONTINUOUS
Status: DISCONTINUED | OUTPATIENT
Start: 2020-01-01 | End: 2020-01-01 | Stop reason: HOSPADM

## 2020-01-01 RX ORDER — POTASSIUM CL/LIDO/0.9 % NACL 10MEQ/0.1L
10 INTRAVENOUS SOLUTION, PIGGYBACK (ML) INTRAVENOUS
Status: DISCONTINUED | OUTPATIENT
Start: 2020-01-01 | End: 2020-01-01

## 2020-01-01 RX ORDER — LACOSAMIDE 50 MG/1
50 TABLET ORAL 2 TIMES DAILY
Status: DISCONTINUED | OUTPATIENT
Start: 2020-01-01 | End: 2020-01-01 | Stop reason: HOSPADM

## 2020-01-01 RX ORDER — ONDANSETRON 4 MG/1
4 TABLET, ORALLY DISINTEGRATING ORAL EVERY 6 HOURS PRN
Status: DISCONTINUED | OUTPATIENT
Start: 2020-01-01 | End: 2020-01-01 | Stop reason: HOSPADM

## 2020-01-01 RX ORDER — POLYETHYLENE GLYCOL 3350 17 G/17G
17 POWDER, FOR SOLUTION ORAL DAILY PRN
Status: DISCONTINUED | OUTPATIENT
Start: 2020-01-01 | End: 2020-01-01

## 2020-01-01 RX ORDER — MAGNESIUM OXIDE 400 MG/1
400 TABLET ORAL 2 TIMES DAILY
Status: DISCONTINUED | OUTPATIENT
Start: 2020-01-01 | End: 2020-01-01 | Stop reason: HOSPADM

## 2020-01-01 RX ORDER — LACOSAMIDE 100 MG/1
100 TABLET ORAL 2 TIMES DAILY
Status: DISCONTINUED | OUTPATIENT
Start: 2020-01-01 | End: 2020-01-01

## 2020-01-01 RX ORDER — ACETAMINOPHEN 500 MG
500-1000 TABLET ORAL EVERY 6 HOURS PRN
Status: DISCONTINUED | OUTPATIENT
Start: 2020-01-01 | End: 2020-01-01 | Stop reason: HOSPADM

## 2020-01-01 RX ORDER — ACETAMINOPHEN 325 MG/1
650 TABLET ORAL EVERY 4 HOURS PRN
Status: DISCONTINUED | OUTPATIENT
Start: 2020-01-01 | End: 2020-01-01 | Stop reason: DRUGHIGH

## 2020-01-01 RX ORDER — PROCHLORPERAZINE 25 MG
12.5 SUPPOSITORY, RECTAL RECTAL EVERY 12 HOURS PRN
Status: DISCONTINUED | OUTPATIENT
Start: 2020-01-01 | End: 2020-01-01 | Stop reason: HOSPADM

## 2020-01-01 RX ORDER — SALIVA STIMULANT COMB. NO.3
2 SPRAY, NON-AEROSOL (ML) MUCOUS MEMBRANE
Status: DISCONTINUED | OUTPATIENT
Start: 2020-01-01 | End: 2020-01-01 | Stop reason: HOSPADM

## 2020-01-01 RX ORDER — LACOSAMIDE 50 MG/1
50 TABLET ORAL 2 TIMES DAILY
Status: SHIPPED | DISCHARGE
Start: 2020-01-01 | End: 2020-01-01

## 2020-01-01 RX ORDER — DULOXETIN HYDROCHLORIDE 30 MG/1
CAPSULE, DELAYED RELEASE ORAL
Qty: 30 CAPSULE | Refills: 0 | Status: SHIPPED | OUTPATIENT
Start: 2020-01-01

## 2020-01-01 RX ORDER — POTASSIUM CHLORIDE 1.5 G/1.58G
20-40 POWDER, FOR SOLUTION ORAL
Status: DISCONTINUED | OUTPATIENT
Start: 2020-01-01 | End: 2020-01-01

## 2020-01-01 RX ORDER — ONDANSETRON 2 MG/ML
4 INJECTION INTRAMUSCULAR; INTRAVENOUS ONCE
Status: COMPLETED | OUTPATIENT
Start: 2020-01-01 | End: 2020-01-01

## 2020-01-01 RX ORDER — ASPIRIN 325 MG
325 TABLET ORAL DAILY
Status: DISCONTINUED | OUTPATIENT
Start: 2020-01-01 | End: 2020-01-01

## 2020-01-01 RX ORDER — NITROGLYCERIN 0.4 MG/1
0.4 TABLET SUBLINGUAL EVERY 5 MIN PRN
Status: DISCONTINUED | OUTPATIENT
Start: 2020-01-01 | End: 2020-01-01 | Stop reason: HOSPADM

## 2020-01-01 RX ORDER — LEVETIRACETAM 750 MG/1
750 TABLET ORAL 2 TIMES DAILY
Status: DISCONTINUED | OUTPATIENT
Start: 2020-01-01 | End: 2020-01-01

## 2020-01-01 RX ORDER — QUETIAPINE FUMARATE 25 MG/1
25 TABLET, FILM COATED ORAL 4 TIMES DAILY PRN
Status: DISCONTINUED | OUTPATIENT
Start: 2020-01-01 | End: 2020-01-01

## 2020-01-01 RX ORDER — MAGNESIUM OXIDE 400 MG/1
400 TABLET ORAL
Status: DISCONTINUED | OUTPATIENT
Start: 2020-01-01 | End: 2020-01-01 | Stop reason: HOSPADM

## 2020-01-01 RX ORDER — GLYCOPYRROLATE 1 MG/1
2-4 TABLET ORAL EVERY 4 HOURS PRN
Status: DISCONTINUED | OUTPATIENT
Start: 2020-01-01 | End: 2020-01-01 | Stop reason: HOSPADM

## 2020-01-01 RX ORDER — IPRATROPIUM BROMIDE 21 UG/1
1 SPRAY, METERED NASAL EVERY 12 HOURS PRN
Status: DISCONTINUED | OUTPATIENT
Start: 2020-01-01 | End: 2020-01-01 | Stop reason: HOSPADM

## 2020-01-01 RX ORDER — LEVETIRACETAM 100 MG/ML
750 SOLUTION ORAL 2 TIMES DAILY
Status: DISCONTINUED | OUTPATIENT
Start: 2020-01-01 | End: 2020-01-01 | Stop reason: HOSPADM

## 2020-01-01 RX ORDER — MAGNESIUM SULFATE HEPTAHYDRATE 40 MG/ML
4 INJECTION, SOLUTION INTRAVENOUS EVERY 4 HOURS PRN
Status: DISCONTINUED | OUTPATIENT
Start: 2020-01-01 | End: 2020-01-01

## 2020-01-01 RX ORDER — LISINOPRIL 2.5 MG/1
TABLET ORAL
Qty: 90 TABLET | Refills: 1 | Status: SHIPPED | OUTPATIENT
Start: 2020-01-01 | End: 2020-01-01

## 2020-01-01 RX ORDER — LEVETIRACETAM 5 MG/ML
500 INJECTION INTRAVASCULAR EVERY 12 HOURS
Status: DISCONTINUED | OUTPATIENT
Start: 2020-01-01 | End: 2020-01-01 | Stop reason: HOSPADM

## 2020-01-01 RX ORDER — PIPERACILLIN SODIUM, TAZOBACTAM SODIUM 3; .375 G/15ML; G/15ML
3.38 INJECTION, POWDER, LYOPHILIZED, FOR SOLUTION INTRAVENOUS EVERY 6 HOURS
Status: DISCONTINUED | OUTPATIENT
Start: 2020-01-01 | End: 2020-01-01 | Stop reason: HOSPADM

## 2020-01-01 RX ORDER — ACETAMINOPHEN 500 MG
1000 TABLET ORAL EVERY 6 HOURS PRN
Status: DISCONTINUED | OUTPATIENT
Start: 2020-01-01 | End: 2020-01-01 | Stop reason: HOSPADM

## 2020-01-01 RX ORDER — LEVETIRACETAM 750 MG/1
750 TABLET ORAL 2 TIMES DAILY
DISCHARGE
Start: 2020-01-01 | End: 2020-01-01

## 2020-01-01 RX ORDER — DEXTROSE MONOHYDRATE 25 G/50ML
25-50 INJECTION, SOLUTION INTRAVENOUS
Status: DISCONTINUED | OUTPATIENT
Start: 2020-01-01 | End: 2020-01-01

## 2020-01-01 RX ORDER — CEFTRIAXONE 1 G/1
1 INJECTION, POWDER, FOR SOLUTION INTRAMUSCULAR; INTRAVENOUS EVERY 24 HOURS
Status: DISCONTINUED | OUTPATIENT
Start: 2020-01-01 | End: 2020-01-01

## 2020-01-01 RX ORDER — HYDRALAZINE HYDROCHLORIDE 20 MG/ML
10-20 INJECTION INTRAMUSCULAR; INTRAVENOUS
Status: DISCONTINUED | OUTPATIENT
Start: 2020-01-01 | End: 2020-01-01 | Stop reason: HOSPADM

## 2020-01-01 RX ORDER — LIDOCAINE 40 MG/G
CREAM TOPICAL
Status: DISCONTINUED | OUTPATIENT
Start: 2020-01-01 | End: 2020-01-01

## 2020-01-01 RX ORDER — HYDROMORPHONE HYDROCHLORIDE 1 MG/ML
0.2 INJECTION, SOLUTION INTRAMUSCULAR; INTRAVENOUS; SUBCUTANEOUS
Status: DISCONTINUED | OUTPATIENT
Start: 2020-01-01 | End: 2020-01-01

## 2020-01-01 RX ORDER — ONDANSETRON 4 MG/1
TABLET, FILM COATED ORAL 3 TIMES DAILY PRN
COMMUNITY
End: 2020-01-01

## 2020-01-01 RX ORDER — PROCHLORPERAZINE MALEATE 5 MG
5 TABLET ORAL EVERY 6 HOURS PRN
Status: DISCONTINUED | OUTPATIENT
Start: 2020-01-01 | End: 2020-01-01

## 2020-01-01 RX ORDER — POLYETHYLENE GLYCOL 3350 17 G/17G
17 POWDER, FOR SOLUTION ORAL 2 TIMES DAILY PRN
Status: DISCONTINUED | OUTPATIENT
Start: 2020-01-01 | End: 2020-01-01 | Stop reason: HOSPADM

## 2020-01-01 RX ORDER — LISINOPRIL 2.5 MG/1
TABLET ORAL
Qty: 90 TABLET | Refills: 1 | OUTPATIENT
Start: 2020-01-01

## 2020-01-01 RX ORDER — HYDROMORPHONE HYDROCHLORIDE 1 MG/ML
.2-.5 INJECTION, SOLUTION INTRAMUSCULAR; INTRAVENOUS; SUBCUTANEOUS
Status: DISCONTINUED | OUTPATIENT
Start: 2020-01-01 | End: 2020-01-01 | Stop reason: HOSPADM

## 2020-01-01 RX ORDER — ACETAMINOPHEN 500 MG
1000 TABLET ORAL 3 TIMES DAILY
Status: DISCONTINUED | OUTPATIENT
Start: 2020-01-01 | End: 2020-01-01 | Stop reason: HOSPADM

## 2020-01-01 RX ORDER — ATORVASTATIN CALCIUM 20 MG/1
20 TABLET, FILM COATED ORAL AT BEDTIME
DISCHARGE
Start: 2020-01-01

## 2020-01-01 RX ORDER — LEVETIRACETAM 500 MG/1
500 TABLET ORAL DAILY
Status: DISCONTINUED | OUTPATIENT
Start: 2020-01-01 | End: 2020-01-01

## 2020-01-01 RX ORDER — LACOSAMIDE 50 MG/1
50 TABLET ORAL 2 TIMES DAILY
Status: DISCONTINUED | OUTPATIENT
Start: 2020-01-01 | End: 2020-01-01

## 2020-01-01 RX ORDER — ATORVASTATIN CALCIUM 40 MG/1
40 TABLET, FILM COATED ORAL DAILY
Qty: 30 TABLET | Refills: 0 | Status: ON HOLD | OUTPATIENT
Start: 2020-01-01 | End: 2020-01-01

## 2020-01-01 RX ORDER — PIPERACILLIN SODIUM, TAZOBACTAM SODIUM 3; .375 G/15ML; G/15ML
3.38 INJECTION, POWDER, LYOPHILIZED, FOR SOLUTION INTRAVENOUS EVERY 6 HOURS
Status: DISCONTINUED | OUTPATIENT
Start: 2020-01-01 | End: 2020-01-01

## 2020-01-01 RX ORDER — BENZTROPINE MESYLATE 1 MG/1
1-2 TABLET ORAL 3 TIMES DAILY PRN
Status: DISCONTINUED | OUTPATIENT
Start: 2020-01-01 | End: 2020-01-01 | Stop reason: HOSPADM

## 2020-01-01 RX ORDER — POTASSIUM CHLORIDE 1500 MG/1
20-40 TABLET, EXTENDED RELEASE ORAL
Status: DISCONTINUED | OUTPATIENT
Start: 2020-01-01 | End: 2020-01-01

## 2020-01-01 RX ORDER — ACETAMINOPHEN 500 MG
500 TABLET ORAL EVERY 4 HOURS PRN
Status: DISCONTINUED | OUTPATIENT
Start: 2020-01-01 | End: 2020-01-01 | Stop reason: HOSPADM

## 2020-01-01 RX ORDER — GADOBUTROL 604.72 MG/ML
6 INJECTION INTRAVENOUS ONCE
Status: COMPLETED | OUTPATIENT
Start: 2020-01-01 | End: 2020-01-01

## 2020-01-01 RX ORDER — LIDOCAINE 40 MG/G
CREAM TOPICAL
Status: DISCONTINUED | OUTPATIENT
Start: 2020-01-01 | End: 2020-01-01 | Stop reason: HOSPADM

## 2020-01-01 RX ORDER — POLYETHYLENE GLYCOL 3350 17 G/17G
1 POWDER, FOR SOLUTION ORAL 2 TIMES DAILY PRN
COMMUNITY
Start: 2020-01-01 | End: 2020-01-01

## 2020-01-01 RX ORDER — POLYETHYLENE GLYCOL 3350 17 G/17G
17 POWDER, FOR SOLUTION ORAL 3 TIMES DAILY PRN
COMMUNITY
Start: 2020-01-01 | End: 2020-01-01

## 2020-01-01 RX ORDER — AMOXICILLIN 250 MG
1 CAPSULE ORAL 2 TIMES DAILY PRN
Status: DISCONTINUED | OUTPATIENT
Start: 2020-01-01 | End: 2020-01-01

## 2020-01-01 RX ORDER — ATORVASTATIN CALCIUM 40 MG/1
40 TABLET, FILM COATED ORAL DAILY
COMMUNITY
End: 2020-01-01

## 2020-01-01 RX ORDER — ACETAMINOPHEN 500 MG
500 TABLET ORAL EVERY 4 HOURS PRN
Status: DISCONTINUED | OUTPATIENT
Start: 2020-01-01 | End: 2020-01-01 | Stop reason: DRUGHIGH

## 2020-01-01 RX ORDER — INSULIN GLARGINE 100 [IU]/ML
26 INJECTION, SOLUTION SUBCUTANEOUS AT BEDTIME
Status: ON HOLD | COMMUNITY
End: 2020-01-01

## 2020-01-01 RX ORDER — AMOXICILLIN 250 MG
2 CAPSULE ORAL 2 TIMES DAILY PRN
Status: DISCONTINUED | OUTPATIENT
Start: 2020-01-01 | End: 2020-01-01 | Stop reason: HOSPADM

## 2020-01-01 RX ORDER — FLUMAZENIL 0.1 MG/ML
0.2 INJECTION, SOLUTION INTRAVENOUS
Status: DISCONTINUED | OUTPATIENT
Start: 2020-01-01 | End: 2020-01-01

## 2020-01-01 RX ORDER — IOPAMIDOL 755 MG/ML
75 INJECTION, SOLUTION INTRAVASCULAR ONCE
Status: COMPLETED | OUTPATIENT
Start: 2020-01-01 | End: 2020-01-01

## 2020-01-01 RX ORDER — OMEPRAZOLE 40 MG/1
CAPSULE, DELAYED RELEASE ORAL
Qty: 30 CAPSULE | Refills: 0 | Status: SHIPPED | OUTPATIENT
Start: 2020-01-01 | End: 2020-01-01

## 2020-01-01 RX ORDER — DIPHENHYDRAMINE HYDROCHLORIDE 50 MG/ML
12.5 INJECTION INTRAMUSCULAR; INTRAVENOUS ONCE
Status: COMPLETED | OUTPATIENT
Start: 2020-01-01 | End: 2020-01-01

## 2020-01-01 RX ORDER — ATORVASTATIN CALCIUM 10 MG/1
20 TABLET, FILM COATED ORAL AT BEDTIME
Status: DISCONTINUED | OUTPATIENT
Start: 2020-01-01 | End: 2020-01-01

## 2020-01-01 RX ORDER — LACOSAMIDE 50 MG/1
50 TABLET ORAL 2 TIMES DAILY
Status: ON HOLD | DISCHARGE
Start: 2020-01-01 | End: 2020-01-01

## 2020-01-01 RX ORDER — ACETAMINOPHEN 325 MG/1
650 TABLET ORAL EVERY 6 HOURS PRN
Status: DISCONTINUED | OUTPATIENT
Start: 2020-01-01 | End: 2020-01-01 | Stop reason: HOSPADM

## 2020-01-01 RX ORDER — LACOSAMIDE 10 MG/ML
100 SOLUTION ORAL 2 TIMES DAILY
Status: DISCONTINUED | OUTPATIENT
Start: 2020-01-01 | End: 2020-01-01 | Stop reason: HOSPADM

## 2020-01-01 RX ORDER — POTASSIUM CHLORIDE 7.45 MG/ML
10 INJECTION INTRAVENOUS
Status: DISCONTINUED | OUTPATIENT
Start: 2020-01-01 | End: 2020-01-01

## 2020-01-01 RX ORDER — POLYETHYLENE GLYCOL 3350 17 G/17G
17 POWDER, FOR SOLUTION ORAL 3 TIMES DAILY PRN
Status: DISCONTINUED | OUTPATIENT
Start: 2020-01-01 | End: 2020-01-01 | Stop reason: HOSPADM

## 2020-01-01 RX ORDER — ACETAMINOPHEN 500 MG
1000 TABLET ORAL 3 TIMES DAILY
Status: DISCONTINUED | OUTPATIENT
Start: 2020-01-01 | End: 2020-01-01

## 2020-01-01 RX ORDER — ACETAMINOPHEN 500 MG
1000 TABLET ORAL 2 TIMES DAILY
COMMUNITY

## 2020-01-01 RX ORDER — INSULIN GLARGINE 100 [IU]/ML
INJECTION, SOLUTION SUBCUTANEOUS
Qty: 3 ML | Refills: 0 | Status: SHIPPED | OUTPATIENT
Start: 2020-01-01 | End: 2020-01-01

## 2020-01-01 RX ORDER — DULOXETIN HYDROCHLORIDE 30 MG/1
CAPSULE, DELAYED RELEASE ORAL
Qty: 30 CAPSULE | Refills: 0 | Status: SHIPPED | OUTPATIENT
Start: 2020-01-01 | End: 2020-01-01

## 2020-01-01 RX ORDER — LIDOCAINE HYDROCHLORIDE 20 MG/ML
JELLY TOPICAL ONCE
Status: COMPLETED | OUTPATIENT
Start: 2020-01-01 | End: 2020-01-01

## 2020-01-01 RX ORDER — ONDANSETRON 4 MG/1
4 TABLET, ORALLY DISINTEGRATING ORAL EVERY 6 HOURS PRN
Status: DISCONTINUED | OUTPATIENT
Start: 2020-01-01 | End: 2020-01-01

## 2020-01-01 RX ORDER — IOPAMIDOL 755 MG/ML
120 INJECTION, SOLUTION INTRAVASCULAR ONCE
Status: DISCONTINUED | OUTPATIENT
Start: 2020-01-01 | End: 2020-01-01 | Stop reason: HOSPADM

## 2020-01-01 RX ORDER — LEVETIRACETAM 5 MG/ML
500 INJECTION INTRAVASCULAR DAILY
Status: DISCONTINUED | OUTPATIENT
Start: 2020-01-01 | End: 2020-01-01

## 2020-01-01 RX ORDER — SENNOSIDES 8.6 MG
1 TABLET ORAL 2 TIMES DAILY PRN
COMMUNITY

## 2020-01-01 RX ORDER — ATROPINE SULFATE 10 MG/ML
1-2 SOLUTION/ DROPS OPHTHALMIC
Status: DISCONTINUED | OUTPATIENT
Start: 2020-01-01 | End: 2020-01-01 | Stop reason: HOSPADM

## 2020-01-01 RX ORDER — IOPAMIDOL 755 MG/ML
74 INJECTION, SOLUTION INTRAVASCULAR ONCE
Status: COMPLETED | OUTPATIENT
Start: 2020-01-01 | End: 2020-01-01

## 2020-01-01 RX ORDER — AMOXICILLIN 250 MG
2 CAPSULE ORAL 2 TIMES DAILY
Status: DISCONTINUED | OUTPATIENT
Start: 2020-01-01 | End: 2020-01-01

## 2020-01-01 RX ORDER — LISINOPRIL 5 MG/1
5 TABLET ORAL DAILY
Status: DISCONTINUED | OUTPATIENT
Start: 2020-01-01 | End: 2020-01-01 | Stop reason: HOSPADM

## 2020-01-01 RX ORDER — HALOPERIDOL 5 MG/ML
.5-1 INJECTION INTRAMUSCULAR
Status: DISCONTINUED | OUTPATIENT
Start: 2020-01-01 | End: 2020-01-01 | Stop reason: HOSPADM

## 2020-01-01 RX ADMIN — MAGNESIUM OXIDE 400 MG: 400 TABLET ORAL at 09:12

## 2020-01-01 RX ADMIN — DULOXETINE HYDROCHLORIDE 30 MG: 30 CAPSULE, DELAYED RELEASE ORAL at 10:28

## 2020-01-01 RX ADMIN — PIPERACILLIN SODIUM AND TAZOBACTAM SODIUM 3.38 G: 3; .375 INJECTION, POWDER, LYOPHILIZED, FOR SOLUTION INTRAVENOUS at 13:06

## 2020-01-01 RX ADMIN — LEVETIRACETAM 750 MG: 750 TABLET, FILM COATED ORAL at 21:03

## 2020-01-01 RX ADMIN — OMEPRAZOLE 40 MG: 20 CAPSULE, DELAYED RELEASE ORAL at 09:45

## 2020-01-01 RX ADMIN — ACETAMINOPHEN 1000 MG: 500 TABLET, FILM COATED ORAL at 21:22

## 2020-01-01 RX ADMIN — ACETAMINOPHEN 1000 MG: 500 TABLET, FILM COATED ORAL at 09:10

## 2020-01-01 RX ADMIN — INSULIN GLARGINE 10 UNITS: 100 INJECTION, SOLUTION SUBCUTANEOUS at 22:30

## 2020-01-01 RX ADMIN — LACOSAMIDE 100 MG: 50 TABLET, FILM COATED ORAL at 09:26

## 2020-01-01 RX ADMIN — SODIUM CHLORIDE 1000 ML: 9 INJECTION, SOLUTION INTRAVENOUS at 12:24

## 2020-01-01 RX ADMIN — PIPERACILLIN SODIUM AND TAZOBACTAM SODIUM 3.38 G: 3; .375 INJECTION, POWDER, LYOPHILIZED, FOR SOLUTION INTRAVENOUS at 18:02

## 2020-01-01 RX ADMIN — OMEPRAZOLE 40 MG: 20 CAPSULE, DELAYED RELEASE ORAL at 09:12

## 2020-01-01 RX ADMIN — LEVETIRACETAM 750 MG: 750 TABLET, FILM COATED ORAL at 09:10

## 2020-01-01 RX ADMIN — POLYETHYLENE GLYCOL 3350 17 G: 17 POWDER, FOR SOLUTION ORAL at 10:38

## 2020-01-01 RX ADMIN — LEVETIRACETAM 750 MG: 750 TABLET, FILM COATED ORAL at 21:36

## 2020-01-01 RX ADMIN — PIPERACILLIN SODIUM AND TAZOBACTAM SODIUM 3.38 G: 3; .375 INJECTION, POWDER, LYOPHILIZED, FOR SOLUTION INTRAVENOUS at 18:15

## 2020-01-01 RX ADMIN — IOPAMIDOL 74 ML: 755 INJECTION, SOLUTION INTRAVENOUS at 10:53

## 2020-01-01 RX ADMIN — LEVETIRACETAM 750 MG: 100 SOLUTION ORAL at 09:57

## 2020-01-01 RX ADMIN — PIPERACILLIN SODIUM AND TAZOBACTAM SODIUM 3.38 G: 3; .375 INJECTION, POWDER, LYOPHILIZED, FOR SOLUTION INTRAVENOUS at 06:27

## 2020-01-01 RX ADMIN — ACETAMINOPHEN 1000 MG: 500 TABLET, FILM COATED ORAL at 15:27

## 2020-01-01 RX ADMIN — DULOXETINE HYDROCHLORIDE 30 MG: 30 CAPSULE, DELAYED RELEASE ORAL at 08:30

## 2020-01-01 RX ADMIN — ASPIRIN 81 MG: 81 TABLET, DELAYED RELEASE ORAL at 09:12

## 2020-01-01 RX ADMIN — LEVETIRACETAM 750 MG: 100 SOLUTION ORAL at 21:22

## 2020-01-01 RX ADMIN — POTASSIUM CHLORIDE AND SODIUM CHLORIDE: 900; 150 INJECTION, SOLUTION INTRAVENOUS at 09:13

## 2020-01-01 RX ADMIN — IPRATROPIUM BROMIDE 1 SPRAY: 21 SPRAY, METERED NASAL at 20:48

## 2020-01-01 RX ADMIN — ACETAMINOPHEN 1000 MG: 500 TABLET, FILM COATED ORAL at 09:27

## 2020-01-01 RX ADMIN — ACETAMINOPHEN 1000 MG: 500 TABLET, FILM COATED ORAL at 08:30

## 2020-01-01 RX ADMIN — INSULIN GLARGINE 7 UNITS: 100 INJECTION, SOLUTION SUBCUTANEOUS at 22:37

## 2020-01-01 RX ADMIN — OMEPRAZOLE 20 MG: 20 CAPSULE, DELAYED RELEASE ORAL at 09:40

## 2020-01-01 RX ADMIN — IPRATROPIUM BROMIDE 1 SPRAY: 21 SPRAY, METERED NASAL at 11:25

## 2020-01-01 RX ADMIN — BISACODYL 10 MG: 10 SUPPOSITORY RECTAL at 11:49

## 2020-01-01 RX ADMIN — Medication 400 MG: at 09:27

## 2020-01-01 RX ADMIN — ATORVASTATIN CALCIUM 40 MG: 40 TABLET, FILM COATED ORAL at 17:45

## 2020-01-01 RX ADMIN — INSULIN ASPART 1 UNITS: 100 INJECTION, SOLUTION INTRAVENOUS; SUBCUTANEOUS at 12:22

## 2020-01-01 RX ADMIN — DEXTROSE AND SODIUM CHLORIDE: 5; 900 INJECTION, SOLUTION INTRAVENOUS at 18:35

## 2020-01-01 RX ADMIN — Medication 400 MG: at 10:28

## 2020-01-01 RX ADMIN — OMEPRAZOLE 40 MG: 20 CAPSULE, DELAYED RELEASE ORAL at 09:21

## 2020-01-01 RX ADMIN — OMEPRAZOLE 40 MG: 20 CAPSULE, DELAYED RELEASE ORAL at 08:41

## 2020-01-01 RX ADMIN — DULOXETINE HYDROCHLORIDE 30 MG: 30 CAPSULE, DELAYED RELEASE ORAL at 08:26

## 2020-01-01 RX ADMIN — ASPIRIN 81 MG: 81 TABLET ORAL at 11:30

## 2020-01-01 RX ADMIN — INSULIN GLARGINE 26 UNITS: 100 INJECTION, SOLUTION SUBCUTANEOUS at 00:40

## 2020-01-01 RX ADMIN — LEVETIRACETAM 500 MG: 250 TABLET, FILM COATED ORAL at 09:21

## 2020-01-01 RX ADMIN — ASPIRIN 81 MG: 81 TABLET, DELAYED RELEASE ORAL at 09:45

## 2020-01-01 RX ADMIN — OMEPRAZOLE 40 MG: 20 CAPSULE, DELAYED RELEASE ORAL at 12:13

## 2020-01-01 RX ADMIN — LEVETIRACETAM 750 MG: 750 TABLET, FILM COATED ORAL at 09:45

## 2020-01-01 RX ADMIN — LEVETIRACETAM 750 MG: 250 TABLET, FILM COATED ORAL at 20:50

## 2020-01-01 RX ADMIN — DULOXETINE HYDROCHLORIDE 30 MG: 30 CAPSULE, DELAYED RELEASE ORAL at 08:05

## 2020-01-01 RX ADMIN — INSULIN GLARGINE 13 UNITS: 100 INJECTION, SOLUTION SUBCUTANEOUS at 22:11

## 2020-01-01 RX ADMIN — ONDANSETRON 4 MG: 2 INJECTION INTRAMUSCULAR; INTRAVENOUS at 10:10

## 2020-01-01 RX ADMIN — Medication 400 MG: at 20:42

## 2020-01-01 RX ADMIN — MAGNESIUM OXIDE 400 MG: 400 TABLET ORAL at 13:11

## 2020-01-01 RX ADMIN — MAGNESIUM OXIDE 400 MG: 400 TABLET ORAL at 09:10

## 2020-01-01 RX ADMIN — ASPIRIN 81 MG: 81 TABLET, DELAYED RELEASE ORAL at 10:29

## 2020-01-01 RX ADMIN — LEVETIRACETAM 750 MG: 100 INJECTION, SOLUTION INTRAVENOUS at 09:52

## 2020-01-01 RX ADMIN — LACOSAMIDE 100 MG: 50 TABLET, FILM COATED ORAL at 09:10

## 2020-01-01 RX ADMIN — SODIUM CHLORIDE 90 ML: 9 INJECTION, SOLUTION INTRAVENOUS at 06:16

## 2020-01-01 RX ADMIN — TICAGRELOR 90 MG: 90 TABLET ORAL at 08:05

## 2020-01-01 RX ADMIN — LORAZEPAM 0.5 MG: 2 INJECTION INTRAMUSCULAR; INTRAVENOUS at 16:13

## 2020-01-01 RX ADMIN — IOPAMIDOL 75 ML: 755 INJECTION, SOLUTION INTRAVENOUS at 07:10

## 2020-01-01 RX ADMIN — ACETAMINOPHEN 500 MG: 500 TABLET, FILM COATED ORAL at 19:55

## 2020-01-01 RX ADMIN — LACOSAMIDE 100 MG: 100 TABLET, FILM COATED ORAL at 09:12

## 2020-01-01 RX ADMIN — ACETAMINOPHEN 1000 MG: 500 TABLET, FILM COATED ORAL at 22:04

## 2020-01-01 RX ADMIN — LACOSAMIDE 100 MG: 100 TABLET, FILM COATED ORAL at 19:37

## 2020-01-01 RX ADMIN — OMEPRAZOLE 40 MG: 20 CAPSULE, DELAYED RELEASE ORAL at 08:26

## 2020-01-01 RX ADMIN — LEVETIRACETAM 750 MG: 250 TABLET, FILM COATED ORAL at 21:21

## 2020-01-01 RX ADMIN — METFORMIN HYDROCHLORIDE 1000 MG: 500 TABLET, FILM COATED ORAL at 17:33

## 2020-01-01 RX ADMIN — INSULIN ASPART 1 UNITS: 100 INJECTION, SOLUTION INTRAVENOUS; SUBCUTANEOUS at 22:30

## 2020-01-01 RX ADMIN — TAMSULOSIN HYDROCHLORIDE 0.4 MG: 0.4 CAPSULE ORAL at 09:09

## 2020-01-01 RX ADMIN — INSULIN ASPART 1 UNITS: 100 INJECTION, SOLUTION INTRAVENOUS; SUBCUTANEOUS at 12:32

## 2020-01-01 RX ADMIN — PIPERACILLIN SODIUM AND TAZOBACTAM SODIUM 3.38 G: 3; .375 INJECTION, POWDER, LYOPHILIZED, FOR SOLUTION INTRAVENOUS at 00:12

## 2020-01-01 RX ADMIN — SODIUM CHLORIDE 100 MG: 9 INJECTION, SOLUTION INTRAVENOUS at 08:27

## 2020-01-01 RX ADMIN — SODIUM CHLORIDE 100 MG: 9 INJECTION, SOLUTION INTRAVENOUS at 08:54

## 2020-01-01 RX ADMIN — PIPERACILLIN SODIUM AND TAZOBACTAM SODIUM 3.38 G: 3; .375 INJECTION, POWDER, LYOPHILIZED, FOR SOLUTION INTRAVENOUS at 05:57

## 2020-01-01 RX ADMIN — LEVETIRACETAM 1000 MG: 10 INJECTION INTRAVENOUS at 15:18

## 2020-01-01 RX ADMIN — TICAGRELOR 90 MG: 90 TABLET ORAL at 09:09

## 2020-01-01 RX ADMIN — TAMSULOSIN HYDROCHLORIDE 0.4 MG: 0.4 CAPSULE ORAL at 17:45

## 2020-01-01 RX ADMIN — PIPERACILLIN SODIUM AND TAZOBACTAM SODIUM 3.38 G: 3; .375 INJECTION, POWDER, LYOPHILIZED, FOR SOLUTION INTRAVENOUS at 11:13

## 2020-01-01 RX ADMIN — TAMSULOSIN HYDROCHLORIDE 0.4 MG: 0.4 CAPSULE ORAL at 09:50

## 2020-01-01 RX ADMIN — DOCUSATE SODIUM 50 MG AND SENNOSIDES 8.6 MG 1 TABLET: 8.6; 5 TABLET, FILM COATED ORAL at 20:50

## 2020-01-01 RX ADMIN — TAMSULOSIN HYDROCHLORIDE 0.4 MG: 0.4 CAPSULE ORAL at 08:26

## 2020-01-01 RX ADMIN — LIDOCAINE HYDROCHLORIDE: 20 JELLY TOPICAL at 14:09

## 2020-01-01 RX ADMIN — SODIUM CHLORIDE 100 MG: 9 INJECTION, SOLUTION INTRAVENOUS at 22:11

## 2020-01-01 RX ADMIN — INSULIN ASPART 1 UNITS: 100 INJECTION, SOLUTION INTRAVENOUS; SUBCUTANEOUS at 18:42

## 2020-01-01 RX ADMIN — POLYETHYLENE GLYCOL 3350 17 G: 17 POWDER, FOR SOLUTION ORAL at 17:56

## 2020-01-01 RX ADMIN — MAGNESIUM OXIDE 400 MG: 400 TABLET ORAL at 16:40

## 2020-01-01 RX ADMIN — TAMSULOSIN HYDROCHLORIDE 0.4 MG: 0.4 CAPSULE ORAL at 08:05

## 2020-01-01 RX ADMIN — DOCUSATE SODIUM 50 MG AND SENNOSIDES 8.6 MG 2 TABLET: 8.6; 5 TABLET, FILM COATED ORAL at 09:10

## 2020-01-01 RX ADMIN — DULOXETINE HYDROCHLORIDE 30 MG: 30 CAPSULE, DELAYED RELEASE ORAL at 09:21

## 2020-01-01 RX ADMIN — IPRATROPIUM BROMIDE 1 SPRAY: 21 SPRAY, METERED NASAL at 21:23

## 2020-01-01 RX ADMIN — Medication 400 MG: at 20:48

## 2020-01-01 RX ADMIN — SODIUM CHLORIDE 1000 ML: 9 INJECTION, SOLUTION INTRAVENOUS at 10:10

## 2020-01-01 RX ADMIN — ACETAMINOPHEN 1000 MG: 500 TABLET, FILM COATED ORAL at 21:35

## 2020-01-01 RX ADMIN — OMEPRAZOLE 20 MG: 20 CAPSULE, DELAYED RELEASE ORAL at 08:07

## 2020-01-01 RX ADMIN — QUETIAPINE 25 MG: 25 TABLET ORAL at 19:38

## 2020-01-01 RX ADMIN — ATORVASTATIN CALCIUM 40 MG: 40 TABLET, FILM COATED ORAL at 09:27

## 2020-01-01 RX ADMIN — SODIUM CHLORIDE, PRESERVATIVE FREE: 5 INJECTION INTRAVENOUS at 17:47

## 2020-01-01 RX ADMIN — LEVETIRACETAM 750 MG: 750 TABLET, FILM COATED ORAL at 21:51

## 2020-01-01 RX ADMIN — ASPIRIN 81 MG: 81 TABLET ORAL at 08:30

## 2020-01-01 RX ADMIN — LEVETIRACETAM 750 MG: 750 TABLET, FILM COATED ORAL at 08:30

## 2020-01-01 RX ADMIN — PIPERACILLIN SODIUM AND TAZOBACTAM SODIUM 3.38 G: 3; .375 INJECTION, POWDER, LYOPHILIZED, FOR SOLUTION INTRAVENOUS at 05:59

## 2020-01-01 RX ADMIN — ATORVASTATIN CALCIUM 40 MG: 40 TABLET, FILM COATED ORAL at 09:50

## 2020-01-01 RX ADMIN — DULOXETINE HYDROCHLORIDE 30 MG: 30 CAPSULE, DELAYED RELEASE ORAL at 09:40

## 2020-01-01 RX ADMIN — DOCUSATE SODIUM 50 MG AND SENNOSIDES 8.6 MG 2 TABLET: 8.6; 5 TABLET, FILM COATED ORAL at 20:03

## 2020-01-01 RX ADMIN — ASPIRIN 81 MG: 81 TABLET, DELAYED RELEASE ORAL at 09:09

## 2020-01-01 RX ADMIN — MAGNESIUM OXIDE 400 MG: 400 TABLET ORAL at 18:25

## 2020-01-01 RX ADMIN — LISINOPRIL 5 MG: 5 TABLET ORAL at 09:26

## 2020-01-01 RX ADMIN — DULOXETINE HYDROCHLORIDE 30 MG: 30 CAPSULE, DELAYED RELEASE ORAL at 11:31

## 2020-01-01 RX ADMIN — ACETAMINOPHEN 1000 MG: 500 TABLET, FILM COATED ORAL at 08:46

## 2020-01-01 RX ADMIN — GADOBUTROL 6 ML: 604.72 INJECTION INTRAVENOUS at 08:33

## 2020-01-01 RX ADMIN — TAMSULOSIN HYDROCHLORIDE 0.4 MG: 0.4 CAPSULE ORAL at 09:06

## 2020-01-01 RX ADMIN — ASPIRIN 81 MG: 81 TABLET, DELAYED RELEASE ORAL at 09:06

## 2020-01-01 RX ADMIN — TAMSULOSIN HYDROCHLORIDE 0.4 MG: 0.4 CAPSULE ORAL at 09:12

## 2020-01-01 RX ADMIN — LEVETIRACETAM 750 MG: 750 TABLET, FILM COATED ORAL at 10:50

## 2020-01-01 RX ADMIN — IOPAMIDOL 120 ML: 755 INJECTION, SOLUTION INTRAVENOUS at 19:37

## 2020-01-01 RX ADMIN — LACOSAMIDE 100 MG: 50 TABLET, FILM COATED ORAL at 20:19

## 2020-01-01 RX ADMIN — DULOXETINE HYDROCHLORIDE 30 MG: 30 CAPSULE, DELAYED RELEASE ORAL at 08:46

## 2020-01-01 RX ADMIN — LEVETIRACETAM 750 MG: 250 TABLET, FILM COATED ORAL at 00:26

## 2020-01-01 RX ADMIN — INSULIN ASPART 1 UNITS: 100 INJECTION, SOLUTION INTRAVENOUS; SUBCUTANEOUS at 08:24

## 2020-01-01 RX ADMIN — INSULIN ASPART 1 UNITS: 100 INJECTION, SOLUTION INTRAVENOUS; SUBCUTANEOUS at 18:23

## 2020-01-01 RX ADMIN — METFORMIN HYDROCHLORIDE 1000 MG: 500 TABLET, FILM COATED ORAL at 13:00

## 2020-01-01 RX ADMIN — SODIUM CHLORIDE 100 MG: 9 INJECTION, SOLUTION INTRAVENOUS at 22:41

## 2020-01-01 RX ADMIN — OMEPRAZOLE 40 MG: 20 CAPSULE, DELAYED RELEASE ORAL at 09:06

## 2020-01-01 RX ADMIN — TAMSULOSIN HYDROCHLORIDE 0.4 MG: 0.4 CAPSULE ORAL at 09:27

## 2020-01-01 RX ADMIN — LEVETIRACETAM 750 MG: 750 TABLET, FILM COATED ORAL at 20:42

## 2020-01-01 RX ADMIN — PIPERACILLIN SODIUM AND TAZOBACTAM SODIUM 3.38 G: 3; .375 INJECTION, POWDER, LYOPHILIZED, FOR SOLUTION INTRAVENOUS at 00:07

## 2020-01-01 RX ADMIN — SENNOSIDES 2 TABLET: 8.6 TABLET, FILM COATED ORAL at 09:27

## 2020-01-01 RX ADMIN — OMEPRAZOLE 40 MG: 20 CAPSULE, DELAYED RELEASE ORAL at 11:30

## 2020-01-01 RX ADMIN — MAGNESIUM SULFATE IN WATER 4 G: 40 INJECTION, SOLUTION INTRAVENOUS at 09:51

## 2020-01-01 RX ADMIN — TICAGRELOR 90 MG: 90 TABLET ORAL at 09:28

## 2020-01-01 RX ADMIN — ATORVASTATIN CALCIUM 40 MG: 40 TABLET, FILM COATED ORAL at 08:26

## 2020-01-01 RX ADMIN — Medication 400 MG: at 09:10

## 2020-01-01 RX ADMIN — SODIUM CHLORIDE: 9 INJECTION, SOLUTION INTRAVENOUS at 23:38

## 2020-01-01 RX ADMIN — Medication 400 MG: at 09:09

## 2020-01-01 RX ADMIN — LEVETIRACETAM 500 MG: 5 INJECTION, SOLUTION INTRAVENOUS at 10:07

## 2020-01-01 RX ADMIN — POTASSIUM CHLORIDE AND SODIUM CHLORIDE: 900; 150 INJECTION, SOLUTION INTRAVENOUS at 00:40

## 2020-01-01 RX ADMIN — Medication 400 MG: at 21:03

## 2020-01-01 RX ADMIN — TAMSULOSIN HYDROCHLORIDE 0.4 MG: 0.4 CAPSULE ORAL at 09:10

## 2020-01-01 RX ADMIN — DEXTROSE AND SODIUM CHLORIDE: 5; 900 INJECTION, SOLUTION INTRAVENOUS at 10:43

## 2020-01-01 RX ADMIN — SENNOSIDES 2 TABLET: 8.6 TABLET, FILM COATED ORAL at 08:05

## 2020-01-01 RX ADMIN — LACOSAMIDE 50 MG: 50 TABLET, FILM COATED ORAL at 21:02

## 2020-01-01 RX ADMIN — LACOSAMIDE 100 MG: 100 TABLET, FILM COATED ORAL at 20:03

## 2020-01-01 RX ADMIN — TICAGRELOR 90 MG: 90 TABLET ORAL at 10:27

## 2020-01-01 RX ADMIN — TICAGRELOR 90 MG: 90 TABLET ORAL at 20:42

## 2020-01-01 RX ADMIN — LEVETIRACETAM 500 MG: 5 INJECTION, SOLUTION INTRAVENOUS at 09:12

## 2020-01-01 RX ADMIN — Medication 400 MG: at 00:40

## 2020-01-01 RX ADMIN — POTASSIUM CHLORIDE AND SODIUM CHLORIDE: 900; 150 INJECTION, SOLUTION INTRAVENOUS at 08:33

## 2020-01-01 RX ADMIN — ACETAMINOPHEN 1000 MG: 500 TABLET, FILM COATED ORAL at 00:27

## 2020-01-01 RX ADMIN — DEXTROSE AND SODIUM CHLORIDE: 5; 450 INJECTION, SOLUTION INTRAVENOUS at 11:58

## 2020-01-01 RX ADMIN — LEVETIRACETAM 750 MG: 100 INJECTION, SOLUTION INTRAVENOUS at 21:11

## 2020-01-01 RX ADMIN — MAGNESIUM OXIDE 400 MG: 400 TABLET ORAL at 08:30

## 2020-01-01 RX ADMIN — POLYETHYLENE GLYCOL 400 AND PROPYLENE GLYCOL 2 DROP: 4; 3 SOLUTION/ DROPS OPHTHALMIC at 19:51

## 2020-01-01 RX ADMIN — CEFTRIAXONE SODIUM 1 G: 1 INJECTION, POWDER, FOR SOLUTION INTRAMUSCULAR; INTRAVENOUS at 19:36

## 2020-01-01 RX ADMIN — ASPIRIN 300 MG: 300 SUPPOSITORY RECTAL at 09:52

## 2020-01-01 RX ADMIN — LEVETIRACETAM 1000 MG: 10 INJECTION INTRAVENOUS at 21:38

## 2020-01-01 RX ADMIN — SODIUM PHOSPHATE, MONOBASIC, MONOHYDRATE 15 MMOL: 276; 142 INJECTION, SOLUTION INTRAVENOUS at 09:53

## 2020-01-01 RX ADMIN — PIPERACILLIN SODIUM AND TAZOBACTAM SODIUM 3.38 G: 3; .375 INJECTION, POWDER, LYOPHILIZED, FOR SOLUTION INTRAVENOUS at 00:29

## 2020-01-01 RX ADMIN — TAMSULOSIN HYDROCHLORIDE 0.4 MG: 0.4 CAPSULE ORAL at 09:45

## 2020-01-01 RX ADMIN — LEVETIRACETAM 750 MG: 250 TABLET, FILM COATED ORAL at 20:03

## 2020-01-01 RX ADMIN — LEVETIRACETAM 750 MG: 100 INJECTION, SOLUTION INTRAVENOUS at 21:50

## 2020-01-01 RX ADMIN — INSULIN ASPART 2 UNITS: 100 INJECTION, SOLUTION INTRAVENOUS; SUBCUTANEOUS at 15:06

## 2020-01-01 RX ADMIN — THIAMINE HYDROCHLORIDE 100 MG: 100 INJECTION, SOLUTION INTRAMUSCULAR; INTRAVENOUS at 13:26

## 2020-01-01 RX ADMIN — ACETAMINOPHEN 1000 MG: 500 TABLET, FILM COATED ORAL at 09:06

## 2020-01-01 RX ADMIN — ACETAMINOPHEN 1000 MG: 500 TABLET, FILM COATED ORAL at 09:45

## 2020-01-01 RX ADMIN — SODIUM CHLORIDE 100 ML: 9 INJECTION, SOLUTION INTRAVENOUS at 19:37

## 2020-01-01 RX ADMIN — TICAGRELOR 90 MG: 90 TABLET ORAL at 08:47

## 2020-01-01 RX ADMIN — TICAGRELOR 90 MG: 90 TABLET ORAL at 10:09

## 2020-01-01 RX ADMIN — TICAGRELOR 90 MG: 90 TABLET ORAL at 22:05

## 2020-01-01 RX ADMIN — ATORVASTATIN CALCIUM 40 MG: 40 TABLET, FILM COATED ORAL at 09:10

## 2020-01-01 RX ADMIN — LEVETIRACETAM 750 MG: 750 TABLET, FILM COATED ORAL at 09:50

## 2020-01-01 RX ADMIN — LACOSAMIDE 100 MG: 10 SOLUTION ORAL at 21:36

## 2020-01-01 RX ADMIN — Medication 400 MG: at 09:50

## 2020-01-01 RX ADMIN — LEVETIRACETAM 750 MG: 750 TABLET, FILM COATED ORAL at 10:38

## 2020-01-01 RX ADMIN — MAGNESIUM OXIDE 400 MG: 400 TABLET ORAL at 21:21

## 2020-01-01 RX ADMIN — SENNOSIDES 2 TABLET: 8.6 TABLET, FILM COATED ORAL at 10:29

## 2020-01-01 RX ADMIN — DULOXETINE HYDROCHLORIDE 30 MG: 30 CAPSULE, DELAYED RELEASE ORAL at 09:10

## 2020-01-01 RX ADMIN — TAMSULOSIN HYDROCHLORIDE 0.4 MG: 0.4 CAPSULE ORAL at 21:36

## 2020-01-01 RX ADMIN — IOPAMIDOL 70 ML: 755 INJECTION, SOLUTION INTRAVENOUS at 14:21

## 2020-01-01 RX ADMIN — SODIUM CHLORIDE 1100 MG PE: 9 INJECTION, SOLUTION INTRAVENOUS at 13:14

## 2020-01-01 RX ADMIN — TICAGRELOR 90 MG: 90 TABLET ORAL at 20:48

## 2020-01-01 RX ADMIN — INSULIN ASPART 1 UNITS: 100 INJECTION, SOLUTION INTRAVENOUS; SUBCUTANEOUS at 12:31

## 2020-01-01 RX ADMIN — LACOSAMIDE 100 MG: 10 SOLUTION ORAL at 21:14

## 2020-01-01 RX ADMIN — SODIUM CHLORIDE 50 MG: 9 INJECTION, SOLUTION INTRAVENOUS at 22:15

## 2020-01-01 RX ADMIN — MAGNESIUM OXIDE 400 MG: 400 TABLET ORAL at 09:21

## 2020-01-01 RX ADMIN — LISINOPRIL 5 MG: 5 TABLET ORAL at 13:44

## 2020-01-01 RX ADMIN — SENNOSIDES 2 TABLET: 8.6 TABLET, FILM COATED ORAL at 09:51

## 2020-01-01 RX ADMIN — PIPERACILLIN SODIUM AND TAZOBACTAM SODIUM 3.38 G: 3; .375 INJECTION, POWDER, LYOPHILIZED, FOR SOLUTION INTRAVENOUS at 12:14

## 2020-01-01 RX ADMIN — OMEPRAZOLE 20 MG: 20 CAPSULE, DELAYED RELEASE ORAL at 09:50

## 2020-01-01 RX ADMIN — ATORVASTATIN CALCIUM 20 MG: 20 TABLET, FILM COATED ORAL at 21:46

## 2020-01-01 RX ADMIN — INSULIN ASPART 2 UNITS: 100 INJECTION, SOLUTION INTRAVENOUS; SUBCUTANEOUS at 13:24

## 2020-01-01 RX ADMIN — INSULIN ASPART 1 UNITS: 100 INJECTION, SOLUTION INTRAVENOUS; SUBCUTANEOUS at 13:42

## 2020-01-01 RX ADMIN — ACETAMINOPHEN 1000 MG: 500 TABLET, FILM COATED ORAL at 09:21

## 2020-01-01 RX ADMIN — ASPIRIN 81 MG: 81 TABLET, DELAYED RELEASE ORAL at 09:28

## 2020-01-01 RX ADMIN — ATORVASTATIN CALCIUM 40 MG: 40 TABLET, FILM COATED ORAL at 08:05

## 2020-01-01 RX ADMIN — TICAGRELOR 90 MG: 90 TABLET ORAL at 09:40

## 2020-01-01 RX ADMIN — OMEPRAZOLE 20 MG: 20 CAPSULE, DELAYED RELEASE ORAL at 09:10

## 2020-01-01 RX ADMIN — SODIUM CHLORIDE 62 ML: 9 INJECTION, SOLUTION INTRAVENOUS at 07:10

## 2020-01-01 RX ADMIN — DOCUSATE SODIUM 50 MG AND SENNOSIDES 8.6 MG 1 TABLET: 8.6; 5 TABLET, FILM COATED ORAL at 21:21

## 2020-01-01 RX ADMIN — ACETAMINOPHEN 1000 MG: 500 TABLET, FILM COATED ORAL at 16:44

## 2020-01-01 RX ADMIN — ATORVASTATIN CALCIUM 40 MG: 40 TABLET, FILM COATED ORAL at 09:06

## 2020-01-01 RX ADMIN — CEFTRIAXONE SODIUM 1 G: 1 INJECTION, POWDER, FOR SOLUTION INTRAMUSCULAR; INTRAVENOUS at 20:45

## 2020-01-01 RX ADMIN — INSULIN GLARGINE 10 UNITS: 100 INJECTION, SOLUTION SUBCUTANEOUS at 22:14

## 2020-01-01 RX ADMIN — SODIUM CHLORIDE 250 ML: 9 INJECTION, SOLUTION INTRAVENOUS at 10:55

## 2020-01-01 RX ADMIN — ATORVASTATIN CALCIUM 20 MG: 20 TABLET, FILM COATED ORAL at 21:02

## 2020-01-01 RX ADMIN — SODIUM CHLORIDE: 9 INJECTION, SOLUTION INTRAVENOUS at 08:38

## 2020-01-01 RX ADMIN — TICAGRELOR 90 MG: 90 TABLET ORAL at 11:44

## 2020-01-01 RX ADMIN — POLYETHYLENE GLYCOL 400 AND PROPYLENE GLYCOL 2 DROP: 4; 3 SOLUTION/ DROPS OPHTHALMIC at 21:10

## 2020-01-01 RX ADMIN — Medication 400 MG: at 08:07

## 2020-01-01 RX ADMIN — INSULIN ASPART 1 UNITS: 100 INJECTION, SOLUTION INTRAVENOUS; SUBCUTANEOUS at 09:11

## 2020-01-01 RX ADMIN — SENNOSIDES 2 TABLET: 8.6 TABLET, FILM COATED ORAL at 09:10

## 2020-01-01 RX ADMIN — OMEPRAZOLE 40 MG: 20 CAPSULE, DELAYED RELEASE ORAL at 09:10

## 2020-01-01 RX ADMIN — LACOSAMIDE 100 MG: 50 TABLET, FILM COATED ORAL at 21:02

## 2020-01-01 RX ADMIN — TICAGRELOR 90 MG: 90 TABLET ORAL at 09:10

## 2020-01-01 RX ADMIN — TICAGRELOR 90 MG: 90 TABLET ORAL at 21:36

## 2020-01-01 RX ADMIN — IPRATROPIUM BROMIDE 1 SPRAY: 21 SPRAY, METERED NASAL at 10:41

## 2020-01-01 RX ADMIN — MAGNESIUM OXIDE 400 MG: 400 TABLET ORAL at 18:54

## 2020-01-01 RX ADMIN — LEVETIRACETAM 750 MG: 750 TABLET, FILM COATED ORAL at 09:27

## 2020-01-01 RX ADMIN — TICAGRELOR 90 MG: 90 TABLET ORAL at 09:27

## 2020-01-01 RX ADMIN — ACETAMINOPHEN 1000 MG: 500 TABLET, FILM COATED ORAL at 10:09

## 2020-01-01 RX ADMIN — CEFTRIAXONE SODIUM 1 G: 1 INJECTION, POWDER, FOR SOLUTION INTRAMUSCULAR; INTRAVENOUS at 20:03

## 2020-01-01 RX ADMIN — ACETAMINOPHEN 1000 MG: 500 TABLET, FILM COATED ORAL at 21:02

## 2020-01-01 RX ADMIN — LEVETIRACETAM 750 MG: 100 SOLUTION ORAL at 21:14

## 2020-01-01 RX ADMIN — TICAGRELOR 90 MG: 90 TABLET ORAL at 21:22

## 2020-01-01 RX ADMIN — LACOSAMIDE 100 MG: 100 TABLET, FILM COATED ORAL at 12:08

## 2020-01-01 RX ADMIN — TICAGRELOR 90 MG: 90 TABLET ORAL at 21:51

## 2020-01-01 RX ADMIN — SODIUM CHLORIDE 250 ML: 9 INJECTION, SOLUTION INTRAVENOUS at 19:42

## 2020-01-01 RX ADMIN — LORAZEPAM 0.5 MG: 2 INJECTION INTRAMUSCULAR; INTRAVENOUS at 12:43

## 2020-01-01 RX ADMIN — IPRATROPIUM BROMIDE 1 SPRAY: 21 SPRAY, METERED NASAL at 10:06

## 2020-01-01 RX ADMIN — POLYETHYLENE GLYCOL 400 AND PROPYLENE GLYCOL 2 DROP: 4; 3 SOLUTION/ DROPS OPHTHALMIC at 19:37

## 2020-01-01 RX ADMIN — INSULIN ASPART 1 UNITS: 100 INJECTION, SOLUTION INTRAVENOUS; SUBCUTANEOUS at 13:17

## 2020-01-01 RX ADMIN — OMEPRAZOLE 40 MG: 20 CAPSULE, DELAYED RELEASE ORAL at 09:27

## 2020-01-01 RX ADMIN — OMEPRAZOLE 40 MG: 20 CAPSULE, DELAYED RELEASE ORAL at 10:12

## 2020-01-01 RX ADMIN — GADOBUTROL 6 ML: 604.72 INJECTION INTRAVENOUS at 10:20

## 2020-01-01 RX ADMIN — ATROPINE SULFATE 2 DROP: 10 SOLUTION/ DROPS OPHTHALMIC at 09:25

## 2020-01-01 RX ADMIN — LEVETIRACETAM 500 MG: 250 TABLET, FILM COATED ORAL at 09:12

## 2020-01-01 RX ADMIN — TICAGRELOR 90 MG: 90 TABLET ORAL at 08:30

## 2020-01-01 RX ADMIN — IOPAMIDOL 70 ML: 755 INJECTION, SOLUTION INTRAVENOUS at 06:16

## 2020-01-01 RX ADMIN — ACETAMINOPHEN 1000 MG: 500 TABLET, FILM COATED ORAL at 12:12

## 2020-01-01 RX ADMIN — DULOXETINE HYDROCHLORIDE 30 MG: 30 CAPSULE, DELAYED RELEASE ORAL at 17:45

## 2020-01-01 RX ADMIN — LEVETIRACETAM 750 MG: 750 TABLET, FILM COATED ORAL at 08:46

## 2020-01-01 RX ADMIN — SODIUM CHLORIDE: 9 INJECTION, SOLUTION INTRAVENOUS at 21:21

## 2020-01-01 RX ADMIN — DULOXETINE HYDROCHLORIDE 30 MG: 30 CAPSULE, DELAYED RELEASE ORAL at 09:27

## 2020-01-01 RX ADMIN — INSULIN GLARGINE 26 UNITS: 100 INJECTION, SOLUTION SUBCUTANEOUS at 21:57

## 2020-01-01 RX ADMIN — Medication 400 MG: at 09:40

## 2020-01-01 RX ADMIN — TICAGRELOR 90 MG: 90 TABLET ORAL at 21:03

## 2020-01-01 RX ADMIN — POTASSIUM CHLORIDE AND SODIUM CHLORIDE: 900; 150 INJECTION, SOLUTION INTRAVENOUS at 09:49

## 2020-01-01 RX ADMIN — LACOSAMIDE 100 MG: 100 TABLET, FILM COATED ORAL at 08:26

## 2020-01-01 RX ADMIN — LACOSAMIDE 50 MG: 50 TABLET, FILM COATED ORAL at 20:48

## 2020-01-01 RX ADMIN — Medication 400 MG: at 21:20

## 2020-01-01 RX ADMIN — ASPIRIN 81 MG: 81 TABLET, DELAYED RELEASE ORAL at 09:49

## 2020-01-01 RX ADMIN — MAGNESIUM OXIDE 400 MG: 400 TABLET ORAL at 12:27

## 2020-01-01 RX ADMIN — QUETIAPINE 25 MG: 25 TABLET ORAL at 19:48

## 2020-01-01 RX ADMIN — LACOSAMIDE 100 MG: 100 TABLET, FILM COATED ORAL at 20:50

## 2020-01-01 RX ADMIN — ACETAMINOPHEN 1000 MG: 500 TABLET, FILM COATED ORAL at 21:51

## 2020-01-01 RX ADMIN — ACETAMINOPHEN 1000 MG: 500 TABLET, FILM COATED ORAL at 09:07

## 2020-01-01 RX ADMIN — DIPHENHYDRAMINE HYDROCHLORIDE 12.5 MG: 50 INJECTION, SOLUTION INTRAMUSCULAR; INTRAVENOUS at 14:58

## 2020-01-01 RX ADMIN — ONDANSETRON 4 MG: 2 INJECTION INTRAMUSCULAR; INTRAVENOUS at 16:47

## 2020-01-01 RX ADMIN — ASPIRIN 81 MG: 81 TABLET, DELAYED RELEASE ORAL at 08:46

## 2020-01-01 RX ADMIN — POLYETHYLENE GLYCOL 400 AND PROPYLENE GLYCOL 2 DROP: 4; 3 SOLUTION/ DROPS OPHTHALMIC at 20:13

## 2020-01-01 RX ADMIN — INSULIN GLARGINE 15 UNITS: 100 INJECTION, SOLUTION SUBCUTANEOUS at 22:24

## 2020-01-01 RX ADMIN — LEVETIRACETAM 750 MG: 100 SOLUTION ORAL at 09:53

## 2020-01-01 RX ADMIN — DULOXETINE HYDROCHLORIDE 30 MG: 30 CAPSULE, DELAYED RELEASE ORAL at 09:12

## 2020-01-01 RX ADMIN — SODIUM CHLORIDE 100 MG: 9 INJECTION, SOLUTION INTRAVENOUS at 09:28

## 2020-01-01 RX ADMIN — TICAGRELOR 90 MG: 90 TABLET ORAL at 09:12

## 2020-01-01 RX ADMIN — LACOSAMIDE 100 MG: 50 TABLET, FILM COATED ORAL at 21:20

## 2020-01-01 RX ADMIN — ASPIRIN 81 MG: 81 TABLET, DELAYED RELEASE ORAL at 09:40

## 2020-01-01 RX ADMIN — LEVETIRACETAM 750 MG: 250 TABLET, FILM COATED ORAL at 21:01

## 2020-01-01 RX ADMIN — LORAZEPAM 0.5 MG: 2 INJECTION INTRAMUSCULAR; INTRAVENOUS at 19:47

## 2020-01-01 RX ADMIN — SENNOSIDES 2 TABLET: 8.6 TABLET, FILM COATED ORAL at 09:06

## 2020-01-01 RX ADMIN — GADOBUTROL 5 ML: 604.72 INJECTION INTRAVENOUS at 20:10

## 2020-01-01 RX ADMIN — LEVETIRACETAM 750 MG: 100 INJECTION, SOLUTION INTRAVENOUS at 09:27

## 2020-01-01 RX ADMIN — LACOSAMIDE 50 MG: 50 TABLET, FILM COATED ORAL at 09:12

## 2020-01-01 RX ADMIN — TICAGRELOR 90 MG: 90 TABLET ORAL at 08:26

## 2020-01-01 RX ADMIN — ONDANSETRON 4 MG: 2 INJECTION INTRAMUSCULAR; INTRAVENOUS at 17:52

## 2020-01-01 RX ADMIN — PIPERACILLIN SODIUM AND TAZOBACTAM SODIUM 3.38 G: 3; .375 INJECTION, POWDER, LYOPHILIZED, FOR SOLUTION INTRAVENOUS at 18:10

## 2020-01-01 RX ADMIN — SODIUM PHOSPHATE, MONOBASIC, MONOHYDRATE 15 MMOL: 276; 142 INJECTION, SOLUTION INTRAVENOUS at 18:54

## 2020-01-01 RX ADMIN — LACOSAMIDE 100 MG: 100 TABLET, FILM COATED ORAL at 08:41

## 2020-01-01 RX ADMIN — ASPIRIN 81 MG: 81 TABLET, DELAYED RELEASE ORAL at 09:11

## 2020-01-01 RX ADMIN — ACETAMINOPHEN 1000 MG: 500 TABLET, FILM COATED ORAL at 09:12

## 2020-01-01 RX ADMIN — LORAZEPAM 1 MG: 2 INJECTION INTRAMUSCULAR; INTRAVENOUS at 10:32

## 2020-01-01 RX ADMIN — PIPERACILLIN SODIUM AND TAZOBACTAM SODIUM 3.38 G: 3; .375 INJECTION, POWDER, LYOPHILIZED, FOR SOLUTION INTRAVENOUS at 12:18

## 2020-01-01 RX ADMIN — Medication 400 MG: at 20:19

## 2020-01-01 RX ADMIN — MAGNESIUM OXIDE 400 MG: 400 TABLET ORAL at 13:27

## 2020-01-01 RX ADMIN — ATORVASTATIN CALCIUM 40 MG: 40 TABLET, FILM COATED ORAL at 09:09

## 2020-01-01 RX ADMIN — LACOSAMIDE 100 MG: 100 TABLET, FILM COATED ORAL at 11:31

## 2020-01-01 RX ADMIN — LEVETIRACETAM 750 MG: 750 TABLET, FILM COATED ORAL at 10:29

## 2020-01-01 RX ADMIN — DOCUSATE SODIUM 50 MG AND SENNOSIDES 8.6 MG 2 TABLET: 8.6; 5 TABLET, FILM COATED ORAL at 08:46

## 2020-01-01 RX ADMIN — LEVETIRACETAM 750 MG: 750 TABLET, FILM COATED ORAL at 08:07

## 2020-01-01 RX ADMIN — METOPROLOL SUCCINATE 12.5 MG: 25 TABLET, EXTENDED RELEASE ORAL at 22:23

## 2020-01-01 RX ADMIN — LACOSAMIDE 100 MG: 100 TABLET, FILM COATED ORAL at 21:36

## 2020-01-01 RX ADMIN — THIAMINE HYDROCHLORIDE 100 MG: 100 INJECTION, SOLUTION INTRAMUSCULAR; INTRAVENOUS at 09:11

## 2020-01-01 RX ADMIN — LACOSAMIDE 100 MG: 100 TABLET, FILM COATED ORAL at 21:20

## 2020-01-01 RX ADMIN — TAMSULOSIN HYDROCHLORIDE 0.4 MG: 0.4 CAPSULE ORAL at 09:40

## 2020-01-01 RX ADMIN — HYDROMORPHONE HYDROCHLORIDE 0.2 MG: 1 INJECTION, SOLUTION INTRAMUSCULAR; INTRAVENOUS; SUBCUTANEOUS at 13:03

## 2020-01-01 RX ADMIN — ASPIRIN 81 MG: 81 TABLET, DELAYED RELEASE ORAL at 08:26

## 2020-01-01 RX ADMIN — LEVETIRACETAM 750 MG: 750 TABLET, FILM COATED ORAL at 08:26

## 2020-01-01 RX ADMIN — ACETAMINOPHEN 1000 MG: 500 TABLET, FILM COATED ORAL at 21:20

## 2020-01-01 RX ADMIN — POTASSIUM CHLORIDE AND SODIUM CHLORIDE: 900; 150 INJECTION, SOLUTION INTRAVENOUS at 09:24

## 2020-01-01 RX ADMIN — TICAGRELOR 90 MG: 90 TABLET ORAL at 23:43

## 2020-01-01 RX ADMIN — LACOSAMIDE 100 MG: 100 TABLET, FILM COATED ORAL at 09:10

## 2020-01-01 RX ADMIN — LACOSAMIDE 50 MG: 50 TABLET, FILM COATED ORAL at 10:38

## 2020-01-01 RX ADMIN — IPRATROPIUM BROMIDE 1 SPRAY: 21 SPRAY, METERED NASAL at 20:42

## 2020-01-01 RX ADMIN — METOPROLOL SUCCINATE 12.5 MG: 25 TABLET, EXTENDED RELEASE ORAL at 23:43

## 2020-01-01 RX ADMIN — MAGNESIUM OXIDE 400 MG: 400 TABLET ORAL at 18:14

## 2020-01-01 RX ADMIN — DULOXETINE HYDROCHLORIDE 30 MG: 30 CAPSULE, DELAYED RELEASE ORAL at 12:12

## 2020-01-01 RX ADMIN — ASPIRIN 81 MG: 81 TABLET, DELAYED RELEASE ORAL at 10:00

## 2020-01-01 RX ADMIN — SODIUM CHLORIDE 100 MG: 9 INJECTION, SOLUTION INTRAVENOUS at 08:52

## 2020-01-01 RX ADMIN — LEVETIRACETAM 750 MG: 750 TABLET, FILM COATED ORAL at 11:31

## 2020-01-01 RX ADMIN — LEVETIRACETAM 750 MG: 100 INJECTION, SOLUTION INTRAVENOUS at 22:05

## 2020-01-01 RX ADMIN — DULOXETINE HYDROCHLORIDE 30 MG: 30 CAPSULE, DELAYED RELEASE ORAL at 09:09

## 2020-01-01 RX ADMIN — ACETAMINOPHEN 1000 MG: 500 TABLET, FILM COATED ORAL at 20:41

## 2020-01-01 RX ADMIN — DOCUSATE SODIUM 50 MG AND SENNOSIDES 8.6 MG 2 TABLET: 8.6; 5 TABLET, FILM COATED ORAL at 12:13

## 2020-01-01 RX ADMIN — SENNOSIDES 2 TABLET: 8.6 TABLET, FILM COATED ORAL at 09:40

## 2020-01-01 RX ADMIN — ATORVASTATIN CALCIUM 40 MG: 40 TABLET, FILM COATED ORAL at 09:12

## 2020-01-01 RX ADMIN — OMEPRAZOLE 40 MG: 20 CAPSULE, DELAYED RELEASE ORAL at 10:28

## 2020-01-01 RX ADMIN — ASPIRIN 81 MG: 81 TABLET, DELAYED RELEASE ORAL at 09:07

## 2020-01-01 RX ADMIN — SENNOSIDES 2 TABLET: 8.6 TABLET, FILM COATED ORAL at 09:45

## 2020-01-01 RX ADMIN — ATORVASTATIN CALCIUM 40 MG: 40 TABLET, FILM COATED ORAL at 17:32

## 2020-01-01 RX ADMIN — MAGNESIUM OXIDE 400 MG: 400 TABLET ORAL at 08:46

## 2020-01-01 RX ADMIN — IPRATROPIUM BROMIDE 1 SPRAY: 21 SPRAY, METERED NASAL at 08:08

## 2020-01-01 RX ADMIN — TICAGRELOR 90 MG: 90 TABLET ORAL at 20:40

## 2020-01-01 RX ADMIN — METOPROLOL SUCCINATE 12.5 MG: 25 TABLET, EXTENDED RELEASE ORAL at 22:30

## 2020-01-01 RX ADMIN — INSULIN ASPART 1 UNITS: 100 INJECTION, SOLUTION INTRAVENOUS; SUBCUTANEOUS at 07:54

## 2020-01-01 RX ADMIN — TICAGRELOR 90 MG: 90 TABLET ORAL at 11:30

## 2020-01-01 RX ADMIN — ASPIRIN 81 MG: 81 TABLET, DELAYED RELEASE ORAL at 08:05

## 2020-01-01 RX ADMIN — LEVETIRACETAM 750 MG: 750 TABLET, FILM COATED ORAL at 21:19

## 2020-01-01 RX ADMIN — MAGNESIUM OXIDE 400 MG: 400 TABLET ORAL at 12:13

## 2020-01-01 RX ADMIN — LACOSAMIDE 100 MG: 100 TABLET, FILM COATED ORAL at 21:51

## 2020-01-01 RX ADMIN — LEVETIRACETAM 500 MG: 250 TABLET, FILM COATED ORAL at 09:11

## 2020-01-01 RX ADMIN — LACOSAMIDE 50 MG: 50 TABLET, FILM COATED ORAL at 08:05

## 2020-01-01 RX ADMIN — ACETAMINOPHEN 1000 MG: 500 TABLET, FILM COATED ORAL at 21:46

## 2020-01-01 RX ADMIN — INSULIN GLARGINE 26 UNITS: 100 INJECTION, SOLUTION SUBCUTANEOUS at 21:21

## 2020-01-01 RX ADMIN — LACOSAMIDE 100 MG: 100 TABLET, FILM COATED ORAL at 00:27

## 2020-01-01 RX ADMIN — LACOSAMIDE 100 MG: 100 TABLET, FILM COATED ORAL at 09:21

## 2020-01-01 RX ADMIN — LEVETIRACETAM 750 MG: 250 TABLET, FILM COATED ORAL at 08:44

## 2020-01-01 RX ADMIN — MAGNESIUM OXIDE 400 MG: 400 TABLET ORAL at 21:02

## 2020-01-01 RX ADMIN — LACOSAMIDE 100 MG: 100 TABLET, FILM COATED ORAL at 08:30

## 2020-01-01 RX ADMIN — TICAGRELOR 90 MG: 90 TABLET ORAL at 09:06

## 2020-01-01 RX ADMIN — SODIUM CHLORIDE 100 ML: 9 INJECTION, SOLUTION INTRAVENOUS at 14:21

## 2020-01-01 RX ADMIN — LEVETIRACETAM 750 MG: 100 INJECTION, SOLUTION INTRAVENOUS at 22:51

## 2020-01-01 RX ADMIN — ATORVASTATIN CALCIUM 40 MG: 40 TABLET, FILM COATED ORAL at 10:28

## 2020-01-01 RX ADMIN — METOPROLOL SUCCINATE 12.5 MG: 25 TABLET, EXTENDED RELEASE ORAL at 21:51

## 2020-01-01 RX ADMIN — CEFTRIAXONE SODIUM 1 G: 1 INJECTION, POWDER, FOR SOLUTION INTRAMUSCULAR; INTRAVENOUS at 19:48

## 2020-01-01 RX ADMIN — ATORVASTATIN CALCIUM 40 MG: 40 TABLET, FILM COATED ORAL at 09:40

## 2020-01-01 RX ADMIN — MAGNESIUM OXIDE 400 MG: 400 TABLET ORAL at 21:46

## 2020-01-01 RX ADMIN — TAMSULOSIN HYDROCHLORIDE 0.4 MG: 0.4 CAPSULE ORAL at 10:27

## 2020-01-01 RX ADMIN — DOCUSATE SODIUM 50 MG AND SENNOSIDES 8.6 MG 2 TABLET: 8.6; 5 TABLET, FILM COATED ORAL at 09:12

## 2020-01-01 RX ADMIN — LACOSAMIDE 100 MG: 100 TABLET, FILM COATED ORAL at 09:45

## 2020-01-01 RX ADMIN — LEVETIRACETAM 750 MG: 750 TABLET, FILM COATED ORAL at 20:19

## 2020-01-01 RX ADMIN — METFORMIN HYDROCHLORIDE 1000 MG: 500 TABLET, FILM COATED ORAL at 09:06

## 2020-01-01 RX ADMIN — LACOSAMIDE 100 MG: 10 SOLUTION ORAL at 09:57

## 2020-01-01 RX ADMIN — LACOSAMIDE 100 MG: 50 TABLET, FILM COATED ORAL at 08:46

## 2020-01-01 RX ADMIN — TICAGRELOR 90 MG: 90 TABLET ORAL at 21:20

## 2020-01-01 RX ADMIN — SENNOSIDES 2 TABLET: 8.6 TABLET, FILM COATED ORAL at 10:08

## 2020-01-01 RX ADMIN — DOCUSATE SODIUM 50 MG AND SENNOSIDES 8.6 MG 2 TABLET: 8.6; 5 TABLET, FILM COATED ORAL at 09:21

## 2020-01-01 RX ADMIN — LEVETIRACETAM 750 MG: 750 TABLET, FILM COATED ORAL at 20:48

## 2020-01-01 RX ADMIN — DEXTROSE 15 G: 15 GEL ORAL at 02:42

## 2020-01-01 RX ADMIN — INSULIN GLARGINE 13 UNITS: 100 INJECTION, SOLUTION SUBCUTANEOUS at 22:05

## 2020-01-01 RX ADMIN — OMEPRAZOLE 40 MG: 20 CAPSULE, DELAYED RELEASE ORAL at 08:30

## 2020-01-01 RX ADMIN — INSULIN ASPART 2 UNITS: 100 INJECTION, SOLUTION INTRAVENOUS; SUBCUTANEOUS at 12:05

## 2020-01-01 RX ADMIN — LACOSAMIDE 100 MG: 10 SOLUTION ORAL at 09:05

## 2020-01-01 RX ADMIN — ATORVASTATIN CALCIUM 20 MG: 20 TABLET, FILM COATED ORAL at 21:21

## 2020-01-01 RX ADMIN — LEVETIRACETAM 500 MG: 5 INJECTION, SOLUTION INTRAVENOUS at 21:47

## 2020-01-01 RX ADMIN — ACETAMINOPHEN 1000 MG: 500 TABLET, FILM COATED ORAL at 11:31

## 2020-01-01 RX ADMIN — INSULIN ASPART 1 UNITS: 100 INJECTION, SOLUTION INTRAVENOUS; SUBCUTANEOUS at 12:11

## 2020-01-01 RX ADMIN — SODIUM CHLORIDE 62 ML: 9 INJECTION, SOLUTION INTRAVENOUS at 10:53

## 2020-01-01 RX ADMIN — LACOSAMIDE 100 MG: 50 TABLET, FILM COATED ORAL at 21:51

## 2020-01-01 RX ADMIN — POTASSIUM CHLORIDE AND SODIUM CHLORIDE: 900; 150 INJECTION, SOLUTION INTRAVENOUS at 09:15

## 2020-01-01 RX ADMIN — DULOXETINE HYDROCHLORIDE 30 MG: 30 CAPSULE, DELAYED RELEASE ORAL at 09:49

## 2020-01-01 RX ADMIN — TICAGRELOR 90 MG: 90 TABLET ORAL at 09:50

## 2020-01-01 RX ADMIN — POTASSIUM CHLORIDE AND SODIUM CHLORIDE: 900; 150 INJECTION, SOLUTION INTRAVENOUS at 22:43

## 2020-01-01 RX ADMIN — CEFTRIAXONE SODIUM 2 G: 2 INJECTION, POWDER, FOR SOLUTION INTRAMUSCULAR; INTRAVENOUS at 21:50

## 2020-01-01 RX ADMIN — DULOXETINE HYDROCHLORIDE 30 MG: 30 CAPSULE, DELAYED RELEASE ORAL at 08:44

## 2020-01-01 RX ADMIN — ASPIRIN 81 MG: 81 TABLET, DELAYED RELEASE ORAL at 09:27

## 2020-01-01 RX ADMIN — MAGNESIUM OXIDE 400 MG: 400 TABLET ORAL at 17:47

## 2020-01-01 RX ADMIN — PIPERACILLIN SODIUM AND TAZOBACTAM SODIUM 3.38 G: 3; .375 INJECTION, POWDER, LYOPHILIZED, FOR SOLUTION INTRAVENOUS at 14:29

## 2020-01-01 RX ADMIN — LEVETIRACETAM 750 MG: 750 TABLET, FILM COATED ORAL at 09:12

## 2020-01-01 RX ADMIN — SODIUM CHLORIDE: 9 INJECTION, SOLUTION INTRAVENOUS at 12:01

## 2020-01-01 RX ADMIN — LEVETIRACETAM 750 MG: 100 INJECTION, SOLUTION INTRAVENOUS at 09:31

## 2020-01-01 RX ADMIN — TAMSULOSIN HYDROCHLORIDE 0.4 MG: 0.4 CAPSULE ORAL at 10:12

## 2020-01-01 RX ADMIN — LACOSAMIDE 100 MG: 50 TABLET, FILM COATED ORAL at 10:27

## 2020-01-01 RX ADMIN — TICAGRELOR 90 MG: 90 TABLET ORAL at 20:19

## 2020-01-01 RX ADMIN — MAGNESIUM OXIDE 400 MG: 400 TABLET ORAL at 11:29

## 2020-01-01 RX ADMIN — MAGNESIUM OXIDE 400 MG: 400 TABLET ORAL at 21:36

## 2020-01-01 SDOH — HEALTH STABILITY: PHYSICAL HEALTH: ON AVERAGE, HOW MANY MINUTES DO YOU ENGAGE IN EXERCISE AT THIS LEVEL?: 0 MIN

## 2020-01-01 SDOH — ECONOMIC STABILITY: FOOD INSECURITY: WITHIN THE PAST 12 MONTHS, YOU WORRIED THAT YOUR FOOD WOULD RUN OUT BEFORE YOU GOT THE MONEY TO BUY MORE.: NEVER TRUE

## 2020-01-01 SDOH — ECONOMIC STABILITY: FOOD INSECURITY: WITHIN THE PAST 12 MONTHS, THE FOOD YOU BOUGHT JUST DIDN'T LAST AND YOU DIDN'T HAVE MONEY TO GET MORE.: NEVER TRUE

## 2020-01-01 SDOH — HEALTH STABILITY: PHYSICAL HEALTH: ON AVERAGE, HOW MANY DAYS PER WEEK DO YOU ENGAGE IN MODERATE TO STRENUOUS EXERCISE (LIKE A BRISK WALK)?: 0 DAYS

## 2020-01-01 SDOH — HEALTH STABILITY: MENTAL HEALTH
DO YOU FEEL STRESS - TENSE, RESTLESS, NERVOUS, OR ANXIOUS, OR UNABLE TO SLEEP AT NIGHT BECAUSE YOUR MIND IS TROUBLED ALL THE TIME - THESE DAYS?: NOT AT ALL

## 2020-01-01 SDOH — SOCIAL STABILITY: SOCIAL NETWORK: IN A TYPICAL WEEK, HOW MANY TIMES DO YOU TALK ON THE PHONE WITH FAMILY, FRIENDS, OR NEIGHBORS?: NOT ASKED

## 2020-01-01 SDOH — ECONOMIC STABILITY: TRANSPORTATION INSECURITY: IN THE PAST 12 MONTHS, HAS LACK OF TRANSPORTATION KEPT YOU FROM MEDICAL APPOINTMENTS OR FROM GETTING MEDICATIONS?: NO

## 2020-01-01 ASSESSMENT — ACTIVITIES OF DAILY LIVING (ADL)
ADLS_ACUITY_SCORE: 26
ADLS_ACUITY_SCORE: 26
ADLS_ACUITY_SCORE: 24
ADLS_ACUITY_SCORE: 24
RETIRED_COMMUNICATION: 0-->UNDERSTANDS/COMMUNICATES WITHOUT DIFFICULTY
ADLS_ACUITY_SCORE: 27
ADLS_ACUITY_SCORE: 25
ADLS_ACUITY_SCORE: 26
ADLS_ACUITY_SCORE: 22
SWALLOWING: 0-->SWALLOWS FOODS/LIQUIDS WITHOUT DIFFICULTY
ADLS_ACUITY_SCORE: 27
ADLS_ACUITY_SCORE: 26
ADLS_ACUITY_SCORE: 29
ADLS_ACUITY_SCORE: 25
ADLS_ACUITY_SCORE: 26
ADLS_ACUITY_SCORE: 23
ADLS_ACUITY_SCORE: 25
ADLS_ACUITY_SCORE: 21
WHICH_OF_THE_ABOVE_FUNCTIONAL_RISKS_HAD_A_RECENT_ONSET_OR_CHANGE?: AMBULATION;TRANSFERRING;TOILETING;DRESSING
WHICH_OF_THE_ABOVE_FUNCTIONAL_RISKS_HAD_A_RECENT_ONSET_OR_CHANGE?: AMBULATION;TRANSFERRING
ADLS_ACUITY_SCORE: 24
DEPENDENT_IADLS:: CLEANING;COOKING;LAUNDRY;SHOPPING;MEAL PREPARATION;TRANSPORTATION
ADLS_ACUITY_SCORE: 22
ADLS_ACUITY_SCORE: 23
ADLS_ACUITY_SCORE: 24
ADLS_ACUITY_SCORE: 25
ADLS_ACUITY_SCORE: 26
SWALLOWING: 2-->DIFFICULTY SWALLOWING LIQUIDS
ADLS_ACUITY_SCORE: 24
ADLS_ACUITY_SCORE: 23
ADLS_ACUITY_SCORE: 29
ADLS_ACUITY_SCORE: 24
DEPENDENT_IADLS:: CLEANING;COOKING;LAUNDRY;SHOPPING;MEAL PREPARATION;TRANSPORTATION
ADLS_ACUITY_SCORE: 23
DRESS: 0-->INDEPENDENT
ADLS_ACUITY_SCORE: 22
ADLS_ACUITY_SCORE: 21
DEPENDENT_IADLS:: CLEANING;COOKING;LAUNDRY;SHOPPING;MEDICATION MANAGEMENT;TRANSPORTATION
ADLS_ACUITY_SCORE: 21
ADLS_ACUITY_SCORE: 23
ADLS_ACUITY_SCORE: 23
ADLS_ACUITY_SCORE: 22
ADLS_ACUITY_SCORE: 23
ADLS_ACUITY_SCORE: 23
AMBULATION: 1-->ASSISTIVE EQUIPMENT
ADLS_ACUITY_SCORE: 26
LACK_OF_TRANSPORTATION: NO
FALL_HISTORY_WITHIN_LAST_SIX_MONTHS: YES
TOILETING: 1-->ASSISTIVE EQUIPMENT
ADLS_ACUITY_SCORE: 24
ADLS_ACUITY_SCORE: 25
ADLS_ACUITY_SCORE: 21
RETIRED_EATING: 0-->INDEPENDENT
ADLS_ACUITY_SCORE: 25
TOILETING: 1-->ASSISTIVE EQUIPMENT
ADLS_ACUITY_SCORE: 21
ADLS_ACUITY_SCORE: 26
ADLS_ACUITY_SCORE: 23
ADLS_ACUITY_SCORE: 25
ADLS_ACUITY_SCORE: 23
ADLS_ACUITY_SCORE: 25
RETIRED_EATING: 0-->INDEPENDENT
ADLS_ACUITY_SCORE: 22
ADLS_ACUITY_SCORE: 23
DRESS: 1-->ASSISTIVE EQUIPMENT
SWALLOWING: 0-->SWALLOWS FOODS/LIQUIDS WITHOUT DIFFICULTY
ADLS_ACUITY_SCORE: 21
ADLS_ACUITY_SCORE: 25
AMBULATION: 1-->ASSISTIVE EQUIPMENT
ADLS_ACUITY_SCORE: 22
ADLS_ACUITY_SCORE: 22
RETIRED_COMMUNICATION: 0-->UNDERSTANDS/COMMUNICATES WITHOUT DIFFICULTY
ADLS_ACUITY_SCORE: 25
ADLS_ACUITY_SCORE: 26
ADLS_ACUITY_SCORE: 24
ADLS_ACUITY_SCORE: 25
RETIRED_EATING: 0-->INDEPENDENT
BATHING: 1-->ASSISTIVE EQUIPMENT
COGNITION: 1 - ATTENTION OR MEMORY DEFICITS
NUMBER_OF_TIMES_PATIENT_HAS_FALLEN_WITHIN_LAST_SIX_MONTHS: 2
ADLS_ACUITY_SCORE: 23
ADLS_ACUITY_SCORE: 29
DEPENDENT_IADLS:: CLEANING;COOKING;LAUNDRY;SHOPPING;MEAL PREPARATION;TRANSPORTATION
DEPENDENT_IADLS:: CLEANING;COOKING;LAUNDRY;SHOPPING;MEAL PREPARATION;TRANSPORTATION
ADLS_ACUITY_SCORE: 26
ADLS_ACUITY_SCORE: 25
ADLS_ACUITY_SCORE: 23
ADLS_ACUITY_SCORE: 27
ADLS_ACUITY_SCORE: 21
ADLS_ACUITY_SCORE: 22
ADLS_ACUITY_SCORE: 25
ADLS_ACUITY_SCORE: 23
ADLS_ACUITY_SCORE: 25
FALL_HISTORY_WITHIN_LAST_SIX_MONTHS: YES
ADLS_ACUITY_SCORE: 23
ADLS_ACUITY_SCORE: 25
ADLS_ACUITY_SCORE: 21
ADLS_ACUITY_SCORE: 21
ADLS_ACUITY_SCORE: 27
COGNITION: 1 - ATTENTION OR MEMORY DEFICITS
TRANSFERRING: 1-->ASSISTIVE EQUIPMENT
ADLS_ACUITY_SCORE: 26
ADLS_ACUITY_SCORE: 22
ADLS_ACUITY_SCORE: 23
ADLS_ACUITY_SCORE: 22
DRESS: 1-->ASSISTIVE EQUIPMENT
ADLS_ACUITY_SCORE: 25
ADLS_ACUITY_SCORE: 25
WHICH_OF_THE_ABOVE_FUNCTIONAL_RISKS_HAD_A_RECENT_ONSET_OR_CHANGE?: AMBULATION;TRANSFERRING;FALL HISTORY
ADLS_ACUITY_SCORE: 26
ADLS_ACUITY_SCORE: 23
ADLS_ACUITY_SCORE: 25
ADLS_ACUITY_SCORE: 25
ADLS_ACUITY_SCORE: 23
ADLS_ACUITY_SCORE: 26
DEPENDENT_IADLS:: CLEANING;COOKING;LAUNDRY;SHOPPING;MEAL PREPARATION;TRANSPORTATION
BATHING: 1-->ASSISTIVE EQUIPMENT
ADLS_ACUITY_SCORE: 24
ADLS_ACUITY_SCORE: 26
ADLS_ACUITY_SCORE: 25
ADLS_ACUITY_SCORE: 27
ADLS_ACUITY_SCORE: 26
ADLS_ACUITY_SCORE: 27
ADLS_ACUITY_SCORE: 22
ADLS_ACUITY_SCORE: 26
TRANSFERRING: 1-->ASSISTIVE EQUIPMENT
ADLS_ACUITY_SCORE: 25
ADLS_ACUITY_SCORE: 24
ADLS_ACUITY_SCORE: 27

## 2020-01-01 ASSESSMENT — ENCOUNTER SYMPTOMS
SPEECH DIFFICULTY: 1
FEVER: 0
SHORTNESS OF BREATH: 0
FREQUENCY: 0
FEVER: 0
RHINORRHEA: 0
SHORTNESS OF BREATH: 0
BACK PAIN: 0
WEAKNESS: 1
WOUND: 1
ARTHRALGIAS: 0
SPEECH DIFFICULTY: 1
HEMATURIA: 0
APPETITE CHANGE: 0
SHORTNESS OF BREATH: 0
HEADACHES: 0
SHORTNESS OF BREATH: 0
ABDOMINAL PAIN: 0
VOMITING: 1
SHORTNESS OF BREATH: 0
ABDOMINAL PAIN: 0
COUGH: 0
NAUSEA: 1
COUGH: 0
DYSURIA: 0
FACIAL ASYMMETRY: 0
DIARRHEA: 0
CONFUSION: 1
NAUSEA: 0
NAUSEA: 0
NECK PAIN: 0
COUGH: 1
SORE THROAT: 0
SORE THROAT: 0
WEAKNESS: 1
FACIAL ASYMMETRY: 1
ARTHRALGIAS: 1
CONFUSION: 1
WEAKNESS: 1
ABDOMINAL PAIN: 0
FACIAL ASYMMETRY: 1
MYALGIAS: 0
VOMITING: 0
DIARRHEA: 0

## 2020-01-01 ASSESSMENT — MIFFLIN-ST. JEOR
SCORE: 1271.33
SCORE: 1248.98
SCORE: 1263.17
SCORE: 1198.31
SCORE: 1147.96
SCORE: 1190.14
SCORE: 1216
SCORE: 1202.84
SCORE: 1277.1
SCORE: 1211.01
SCORE: 1197.4
SCORE: 1279.96
SCORE: 1195.13
SCORE: 1211.01
SCORE: 1289.03
SCORE: 1225.52
SCORE: 1165.65
SCORE: 1260.9
SCORE: 1172
SCORE: 1400.15
SCORE: 1171.54
SCORE: 1273.15
SCORE: 1192.41
SCORE: 1404.24

## 2020-01-01 ASSESSMENT — PATIENT HEALTH QUESTIONNAIRE - PHQ9: SUM OF ALL RESPONSES TO PHQ QUESTIONS 1-9: 6

## 2020-01-01 NOTE — ED NOTES
Pt returned after skin tear started bleed. Dr. Walker saw patient  In [facility], told to put Surgiform and redress area. Pt monitored for half an hour, given dressings, activity restrictions, and told to follow up with PCP after holiday for eval of wound. Pt and wife in understanding of instructions. Pt ambulated to car.

## 2020-01-04 NOTE — TELEPHONE ENCOUNTER
"Last Written Prescription Date:  1/11/19  Last Fill Quantity: 90 capsule,  # refills: 3   Last office visit: 9/23/2019 with prescribing provider:  Yuni   Future Office Visit:      Requested Prescriptions   Pending Prescriptions Disp Refills     omeprazole (PRILOSEC) 40 MG DR capsule [Pharmacy Med Name: OMEPRAZOLE 40MG CAPSULES] 90 capsule 3     Sig: TAKE 1 CAPSULE(40 MG) BY MOUTH DAILY 30 TO 60 MINUTES BEFORE A MEAL       PPI Protocol Passed - 1/4/2020  3:12 AM        Passed - Not on Clopidogrel (unless Pantoprazole ordered)        Passed - No diagnosis of osteoporosis on record        Passed - Recent (12 mo) or future (30 days) visit within the authorizing provider's specialty     Patient has had an office visit with the authorizing provider or a provider within the authorizing providers department within the previous 12 mos or has a future within next 30 days. See \"Patient Info\" tab in inbasket, or \"Choose Columns\" in Meds & Orders section of the refill encounter.              Passed - Medication is active on med list        Passed - Patient is age 18 or older          "

## 2020-01-06 NOTE — TELEPHONE ENCOUNTER
Routing refill request to provider for review/approval because:  Pt was a NO SHOW last  APT 12/19/19.  Pended 30 added pharm comments to schedule.  Please authorize if appropriate.  Thanks,  Kirti Wade RN

## 2020-01-07 NOTE — TELEPHONE ENCOUNTER
"Pharmacy requesting 90 day instead  Prescription approved per [facility] [place].  Stacy MOORE RN    Last Written Prescription Date:  1/6/2020  Last Fill Quantity: 30,  # refills: 0   Last office visit: 9/23/2019 with prescribing provider:     Future Office Visit:   Next 5 appointments (look out 90 days)    Jan 08, 2020  2:30 PM CST  [facility] [address] [id number] [phone]         Requested Prescriptions   Pending Prescriptions Disp Refills     omeprazole (PRILOSEC) 40 MG DR capsule [Pharmacy Med Name: OMEPRAZOLE 40MG CAPSULES] 90 capsule      Sig: TAKE 1 CAPSULE(40 MG) BY MOUTH DAILY 30 TO 60 MINUTES BEFORE A MEAL       PPI Protocol Passed - 1/6/2020  4:28 PM        Passed - Not on Clopidogrel (unless Pantoprazole ordered)        Passed - No diagnosis of osteoporosis on record        Passed - Recent (12 mo) or future (30 days) visit within the authorizing provider's specialty     Patient has had an office visit with the authorizing provider or a provider within the authorizing providers department within the previous 12 mos or has a future within next 30 days. See \"Patient Info\" tab in inbasket, or \"Choose Columns\" in Meds & Orders section of the refill encounter.              Passed - Medication is active on med list        Passed - Patient is age 18 or older        "

## 2020-01-08 PROBLEM — I50.42 CHRONIC COMBINED SYSTOLIC AND DIASTOLIC CONGESTIVE HEART FAILURE (H): Status: RESOLVED | Noted: 2019-01-01 | Resolved: 2020-01-01

## 2020-01-08 PROBLEM — I50.23 ACUTE ON CHRONIC SYSTOLIC CONGESTIVE HEART FAILURE (H): Status: RESOLVED | Noted: 2018-06-07 | Resolved: 2020-01-01

## 2020-01-08 NOTE — PATIENT INSTRUCTIONS

## 2020-01-08 NOTE — PROGRESS NOTES
Subjective     Dustin Pearce is a 89 year old male who presents to clinic today for the following health issues:    [facility]   ED/UC Followup:    Facility:   ED   Date of visit: 12/31/2019  Reason for visit: Fall, initial encounter, Slurred speech, Skin tear of left upper arm without complication, initial encounter  Current Status: patient state that injury from fall is not completely healed        89-year-old man with history of recurrent spells concerning for seizure versus TIA.  He has been pretty well dialed in for the spells in terms of treatments with follow-up with neurology.  He is on anti-convulsant therapy as well as antiplatelet therapy.  He had another similar spell on New Year's Charisse resulting in a fall with a skin tear of his left upper arm.  He was seen in the emergency department.  His left arm skin tear is healing but has not closed.  No subsequent spells since leaving the emergency department.  His hemoglobin A1c was well controlled in September.    Patient Active Problem List   Diagnosis     Coronary artery disease of native artery of native heart with stable angina pectoris (H)     Hyperlipidemia, unspecified hyperlipidemia type     Benign non-nodular prostatic hyperplasia with lower urinary tract symptoms     Gastroesophageal reflux disease without esophagitis     Cerebrovascular accident (H)     Carotid artery stenosis     Abdominal aortic aneurysm (H)     Lumbar back pain     Benign essential hypertension     TIA (transient ischemic attack)     Paroxysmal atrial fibrillation (H)     Ischemic cardiomyopathy     Aortic root dilatation (H)     Physical deconditioning     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)     Anemia due to blood loss, acute     Diarrhea, unspecified type     Nausea and vomiting, intractability of vomiting not specified, unspecified vomiting type     Acute pain of left shoulder     Balance problems     Generalized muscle weakness     Peripheral artery disease (H)      Aortic stenosis     RBBB with left anterior fascicular block     Stroke of unknown etiology (H)     Carotid stenosis     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)     History of TIA (transient ischemic attack) and stroke     UTI (urinary tract infection)     UTI (urinary tract infection) due to Enterococcus     Generalized weakness     Type 2 diabetes mellitus with complication, with long-term current use of insulin (H)     Atherosclerosis of coronary artery of native heart without angina pectoris, unspecified vessel or lesion type     Depression, unspecified depression type     Slow transit constipation     Severe sepsis (H)     Chest discomfort     NSTEMI (non-ST elevated myocardial infarction) (H)     Anemia     Cognitive impairment     Benign prostatic hyperplasia without lower urinary tract symptoms     Other seizures (H)     Expressive aphasia     Spells of speech arrest     Facial droop     Dysarthria     Spell of altered cognition     H/O TIA (transient ischemic attack) and stroke     Seizure disorder (H)     Occlusion of left internal carotid artery     History of CEA (carotid endarterectomy), Right     Coronary artery disease involving native heart without angina pectoris, unspecified vessel or lesion type, h/o MODESTA     Essential hypertension     Debility     Spell of change in speech     Past Surgical History:   Procedure Laterality Date     AMPUTATE FOOT Left 6/4/2017    Procedure: AMPUTATE FOOT;  Sun Add#3: partial left foot amputation - Sagital Saw - Mini C-Arm;  Surgeon: Moe Kirk DPM;  Location:  OR     CHOLECYSTECTOMY       ENDARTERECTOMY CAROTID Right 1/3/2018    Procedure: ENDARTERECTOMY CAROTID;  RIGHT CAROTID ENDARTERECTOMY WITH EEG;  Surgeon: Roland Rodriguez MD;  Location:  OR     ESOPHAGOSCOPY, GASTROSCOPY, DUODENOSCOPY (EGD), COMBINED N/A 12/26/2016    Procedure: COMBINED ESOPHAGOSCOPY, GASTROSCOPY, DUODENOSCOPY (EGD);  Surgeon: Nasim Louis  MD Milagros;  Location:  GI     GENITOURINARY SURGERY      KIDNEY STONE REMOVAL X 4     HC UGI ENDOSCOPY W EUS N/A 2016    Procedure: COMBINED ENDOSCOPIC ULTRASOUND, ESOPHAGOSCOPY, GASTROSCOPY, DUODENOSCOPY (EGD);  Surgeon: Nasim Louis MD;  Location:  GI     HERNIA REPAIR       INCISION AND DRAINAGE FOOT, COMBINED Left 2017    Procedure: COMBINED INCISION AND DRAINAGE FOOT;  REVISIONAL LEFT FOOT INCISION AND DRAINAGE WITH POSSIBLE FLAP CLOSURE , ANTIBIOTIC SOLUTION ;  Surgeon: Nabil Ann DPM;  Location:  OR     LASER HOLMIUM LITHOTRIPSY URETER(S), INSERT STENT, COMBINED  3/2/2012    Procedure:COMBINED CYSTOSCOPY, URETEROSCOPY, LASER HOLMIUM LITHOTRIPSY URETER(S), INSERT STENT; CYSTOSCOPY, RIGHT RETROGRADES, RIGHT URETEROSCOPY, HOLMIUM LASER, RIGHT STENT PLACEMENT; Surgeon:ANJELICA LEÓN; Location: OR     ROTATOR CUFF REPAIR RT/LT         Social History     Tobacco Use     Smoking status: Former Smoker     Packs/day: 1.00     Years: 30.00     Pack years: 30.00     Types: Cigarettes     Start date:      Last attempt to quit: 1999     Years since quittin.0     Smokeless tobacco: Never Used   Substance Use Topics     Alcohol use: Not Currently     Alcohol/week: 7.0 standard drinks     Types: 7 Standard drinks or equivalent per week     Comment: 1 drink per biweekly     Family History   Problem Relation Age of Onset     Breast Cancer Mother      Heart Failure Father 81         Current Outpatient Medications   Medication Sig Dispense Refill     acetaminophen (TYLENOL) 500 MG tablet Take 500-1,000 mg by mouth every 6 hours as needed for mild pain       aspirin EC 81 MG EC tablet Take 1 tablet (81 mg) by mouth daily 30 tablet 0     atorvastatin (LIPITOR) 20 MG tablet Take 2 tablets (40 mg) by mouth daily 30 tablet 0     bisacodyl (DULCOLAX) 10 MG Suppository Place 10 mg rectally daily as needed for constipation       blood glucose monitoring (NO BRAND SPECIFIED)  meter device kit Use to test blood sugar bid times daily or as directed. 1 kit 0     DULoxetine (CYMBALTA) 30 MG capsule TAKE 1 CAPSULE(30 MG) BY MOUTH DAILY 90 capsule 2     insulin glargine (BASAGLAR KWIKPEN) 100 UNIT/ML pen Inject 26 Units Subcutaneous At Bedtime 15 mL 11     ipratropium (ATROVENT) 0.03 % nasal spray INHALE 1 SPRAY IN EACH NOSTRIL EVERY 12 HOURS AS NEEDED 30 mL 11     levETIRAcetam (KEPPRA) 500 MG tablet Take 1 tablet (500 mg) by mouth 2 times daily       MAGNESIUM-OXIDE 400 (241.3 Mg) MG tablet TAKE 1 TABLET BY MOUTH TWICE DAILY 180 tablet 1     metFORMIN (GLUCOPHAGE) 1000 MG tablet Take 1 tablet (1,000 mg) by mouth 2 times daily (with meals) 180 tablet 3     metoprolol succinate ER (TOPROL-XL) 25 MG 24 hr tablet Take 12.5 mg by mouth At Bedtime       nitroGLYcerin (NITROSTAT) 0.4 MG sublingual tablet For chest pain place 1 tablet under the tongue every 5 minutes for 3 doses. If symptoms persist 5 minutes after 1st dose call 911. 25 tablet 0     omeprazole (PRILOSEC) 40 MG DR capsule TAKE 1 CAPSULE(40 MG) BY MOUTH DAILY 30 TO 60 MINUTES BEFORE A MEAL 90 capsule 0     order for DME Equipment being ordered: wheeled walker with hand breaks and seat 1 each 0     order for DME Equipment being ordered: True Matrix Blood Glucose meter. 1 Act 11     polyethylene glycol (MIRALAX/GLYCOLAX) Packet Take 17 g by mouth daily       polyethylene glycol-propylene glycol (SYSTANE ULTRA) 0.4-0.3 % SOLN ophthalmic solution Place 2 drops into both eyes daily as needed for dry eyes       sennosides (SENOKOT) 8.6 MG tablet Take 2 tablets by mouth daily       tamsulosin (FLOMAX) 0.4 MG capsule TAKE 1 CAPSULE BY MOUTH DAILY 90 capsule 3     tamsulosin (FLOMAX) 0.4 MG capsule Take 0.4 mg by mouth At Bedtime       ticagrelor (BRILINTA) 90 MG tablet Take 1 tablet (90 mg) by mouth 2 times daily 180 tablet 1     TRUE METRIX BLOOD GLUCOSE TEST test strip USE TO TEST THREE TIMES DAILY OR AS DIRECTED 300 strip 0     Allergies  "  Allergen Reactions     Oxycodone Other (See Comments)     \"TERRIBLE SWEATING\"     Sulfa Drugs      Tetracycline        Reviewed and updated as needed this visit by Provider         Review of Systems   ROS COMP: Constitutional, HEENT, cardiovascular, pulmonary, gi and gu systems are negative, except as otherwise noted.      Objective    /68 (BP Location: Right arm, Patient Position: Sitting, Cuff Size: Adult Large)   Pulse 71   Temp 96.8  F (36  C) (Oral)   Ht 1.829 m (6')   Wt 71.1 kg (156 lb 12.8 oz)   SpO2 99%   BMI 21.27 kg/m    Body mass index is 21.27 kg/m .  Physical Exam   General: This is a unchanged appearing elderly man in no acute distress. Skin: There is a well-healing shallow skin tear on the left elbow the dressing for which was changed by Dr. Morrissey.  Neurological: Alert and oriented to person present time although memory deficits are present, walks with a cane, cranial nerves II to XII are grossly intact, speech is normal, strength is symmetric    Diagnostic Test Results:  Labs reviewed in Epic        Assessment & Plan     1. 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 PAD (peripheral artery disease) (H)  Continue follow-up with vascular surgery, stable symptoms, on antiplatelet therapy      4. Seizure disorder (H)  Continue anticonvulsants as per neurology    5. Coronary artery disease of native artery of native heart with stable angina pectoris (H)  Stable symptoms    6. Skin tear of left elbow without complication, subsequent encounter  I recommended using Vaseline covering with a Band-Aid with daily dressing changes until skin tear heals, there is no evidence of current infection.    7. Spell of change in speech  The etiology of his recurrent spells remains unclear although seizure disorder is favored.  He was reminded to schedule follow-up with the neurology clinic    8. Fall, subsequent encounter               Return in about 2 " months (around 3/8/2020) for Diabetes Check.    Matt Morrissey MD  [facility]

## 2020-01-08 NOTE — Clinical Note
Please abstract the following data from this visit with this patient into the appropriate field in Epic:Tests that can be patient reported without a hard copy:Eye exam with ophthalmology on this date: 1/7/2020 at [facility] Other Tests found in the patient's chart through Chart Review/Care Everywhere:{Abstract Quality List (Optional):[id number]}Note to Abstraction: If this section is blank, no results were found via Chart Review/Care Everywhere.

## 2020-01-29 NOTE — TELEPHONE ENCOUNTER
"lisinopril (PRINIVIL/ZESTRIL) 2.5 MG tablet    Last Written Prescription Date:  11/3/2019  Last Fill Quantity: 90,  # refills: 1   Last office visit: 1/8/2020 with prescribing provider:  Dr. Morrissey    Future Office Visit:   Next 5 appointments (look out 90 days)    Mar 09, 2020  3:00 PM CDT  Office Visit with Matt Morrissey MD  [facility] [address] [address] [phone]         Requested Prescriptions   Pending Prescriptions Disp Refills     lisinopril (PRINIVIL/ZESTRIL) 2.5 MG tablet [Pharmacy Med Name: LISINOPRIL 2.5MG TABLETS] 90 tablet 1     Sig: TAKE 1 TABLET(2.5 MG) BY MOUTH DAILY       ACE Inhibitors (Including Combos) Protocol Failed - 1/29/2020  3:14 AM        Failed - Medication is active on med list        Passed - Blood pressure under 140/90 in past 12 months     BP Readings from Last 3 Encounters:   01/08/20 126/68   12/31/19 112/68   11/19/19 113/66                 Passed - Recent (12 mo) or future (30 days) visit within the authorizing provider's specialty     Patient has had an office visit with the authorizing provider or a provider within the authorizing providers department within the previous 12 mos or has a future within next 30 days. See \"Patient Info\" tab in inbasket, or \"Choose Columns\" in Meds & Orders section of the refill encounter.              Passed - Patient is age 18 or older        Passed - Normal serum creatinine on file in past 12 months     Recent Labs   Lab Test 12/31/19  1320  12/25/16  0051   CR 0.93   < >  --    CREAT  --   --  0.7    < > = values in this interval not displayed.             Passed - Normal serum potassium on file in past 12 months     Recent Labs   Lab Test 12/31/19  1320   POTASSIUM 4.7               "

## 2020-01-30 NOTE — TELEPHONE ENCOUNTER
Routing refill request to provider for review/approval because:  Drug not active on patient's medication list  Please authorize if appropriate.  Thanks,  Kirti Wade RN

## 2020-03-05 PROBLEM — R56.9 SEIZURE (H): Status: ACTIVE | Noted: 2020-01-01

## 2020-03-05 NOTE — PROGRESS NOTES
ubjective     Dustin Pearce is a 89 year old male who presents to clinic today for the following health issues:    HPI     Follow up on diabetes. Testing blood sugars twice, not having frequent highs or lows, predominantly around 135-140's  Constipation for several days  No severe abdominal pain, nausea or vomiting  Constipation onset after long road trip for   He also stopped walking for exercise  He had one dose of miralax, but it did not work  He also complains of dizziness worst in AM  He is not willing to stop Flomax because it helps so much with LUTS      Patient Active Problem List   Diagnosis     Coronary artery disease of native artery of native heart with stable angina pectoris (H)     Hyperlipidemia LDL goal <70     Benign non-nodular prostatic hyperplasia with lower urinary tract symptoms     Gastroesophageal reflux disease without esophagitis     Cerebrovascular accident (H)     Carotid artery stenosis     Abdominal aortic aneurysm (H)     Lumbar back pain     Benign essential hypertension     TIA (transient ischemic attack)     Ischemic cardiomyopathy     Aortic root dilatation (H)     Physical deconditioning     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)     Anemia due to blood loss, acute     Diarrhea, unspecified type     Nausea and vomiting, intractability of vomiting not specified, unspecified vomiting type     Acute pain of left shoulder     Balance problems     Generalized muscle weakness     Peripheral artery disease (H)     Aortic stenosis     RBBB with left anterior fascicular block     Stroke of unknown etiology (H)     Carotid stenosis     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)     History of TIA (transient ischemic attack) and stroke     UTI (urinary tract infection)     UTI (urinary tract infection) due to Enterococcus     Generalized weakness     Type 2 diabetes mellitus with complication, with long-term current use of insulin (H)      Atherosclerosis of coronary artery of native heart without angina pectoris, unspecified vessel or lesion type     Depression, unspecified depression type     Slow transit constipation     Severe sepsis (H)     Chest discomfort     NSTEMI (non-ST elevated myocardial infarction) (H)     Anemia     Cognitive impairment     Benign prostatic hyperplasia without lower urinary tract symptoms     Other seizures (H)     Expressive aphasia     Spells of speech arrest     Facial droop     Dysarthria     Spell of altered cognition     H/O TIA (transient ischemic attack) and stroke     Seizure disorder (H)     Occlusion of left internal carotid artery     History of CEA (carotid endarterectomy), Right     Coronary artery disease involving native heart without angina pectoris, unspecified vessel or lesion type, h/o MODESTA     Essential hypertension     Debility     Spell of change in speech     Past Surgical History:   Procedure Laterality Date     AMPUTATE FOOT Left 6/4/2017    Procedure: AMPUTATE FOOT;  Sun Add#3: partial left foot amputation - Sagital Saw - Mini C-Arm;  Surgeon: Moe Kirk DPM;  Location:  OR     CHOLECYSTECTOMY       ENDARTERECTOMY CAROTID Right 1/3/2018    Procedure: ENDARTERECTOMY CAROTID;  RIGHT CAROTID ENDARTERECTOMY WITH EEG;  Surgeon: Roland Rodriguez MD;  Location:  OR     ESOPHAGOSCOPY, GASTROSCOPY, DUODENOSCOPY (EGD), COMBINED N/A 12/26/2016    Procedure: COMBINED ESOPHAGOSCOPY, GASTROSCOPY, DUODENOSCOPY (EGD);  Surgeon: Nasim Louis MD;  Location:  GI     GENITOURINARY SURGERY      KIDNEY STONE REMOVAL X 4     HC UGI ENDOSCOPY W EUS N/A 12/29/2016    Procedure: COMBINED ENDOSCOPIC ULTRASOUND, ESOPHAGOSCOPY, GASTROSCOPY, DUODENOSCOPY (EGD);  Surgeon: Nasim Louis MD;  Location:  GI     HERNIA REPAIR       INCISION AND DRAINAGE FOOT, COMBINED Left 6/6/2017    Procedure: COMBINED INCISION AND DRAINAGE FOOT;  REVISIONAL LEFT FOOT INCISION AND DRAINAGE WITH  POSSIBLE FLAP CLOSURE , ANTIBIOTIC SOLUTION ;  Surgeon: Nabil Ann DPM;  Location: [facility] OR     LASER HOLMIUM LITHOTRIPSY URETER(S), INSERT STENT, COMBINED  3/2/2012    Procedure:COMBINED CYSTOSCOPY, URETEROSCOPY, LASER HOLMIUM LITHOTRIPSY URETER(S), INSERT STENT; CYSTOSCOPY, RIGHT RETROGRADES, RIGHT URETEROSCOPY, HOLMIUM LASER, RIGHT STENT PLACEMENT; Surgeon:ANJELICA LEÓN; Location:[facility] OR     ROTATOR CUFF REPAIR RT/LT         Social History     Tobacco Use     Smoking status: Former Smoker     Packs/day: 1.00     Years: 30.00     Pack years: 30.00     Types: Cigarettes     Start date:      Last attempt to quit: 1999     Years since quittin.1     Smokeless tobacco: Never Used   Substance Use Topics     Alcohol use: Not Currently     Alcohol/week: 7.0 standard drinks     Types: 7 Standard drinks or equivalent per week     Comment: 1 drink per biweekly     Family History   Problem Relation Age of Onset     Breast Cancer Mother      Heart Failure Father 81         Current Outpatient Medications   Medication Sig Dispense Refill     acetaminophen (TYLENOL) 500 MG tablet Take 500-1,000 mg by mouth every 6 hours as needed for mild pain       aspirin EC 81 MG EC tablet Take 1 tablet (81 mg) by mouth daily 30 tablet 0     atorvastatin (LIPITOR) 20 MG tablet Take 2 tablets (40 mg) by mouth daily 30 tablet 0     bisacodyl (DULCOLAX) 10 MG suppository Place 1 suppository (10 mg) rectally daily as needed for constipation 4 suppository 11     blood glucose monitoring (NO BRAND SPECIFIED) meter device kit Use to test blood sugar bid times daily or as directed. 1 kit 0     DULoxetine (CYMBALTA) 30 MG capsule TAKE 1 CAPSULE(30 MG) BY MOUTH DAILY 90 capsule 2     insulin glargine (BASAGLAR KWIKPEN) 100 UNIT/ML pen Inject 26 Units Subcutaneous At Bedtime 15 mL 11     ipratropium (ATROVENT) 0.03 % nasal spray INHALE 1 SPRAY IN EACH NOSTRIL EVERY 12 HOURS AS NEEDED 30 mL 11     levETIRAcetam (KEPPRA) 500 MG  "tablet Take 1 tablet (500 mg) by mouth 2 times daily       MAGNESIUM-OXIDE 400 (241.3 Mg) MG tablet TAKE 1 TABLET BY MOUTH TWICE DAILY 180 tablet 1     metFORMIN (GLUCOPHAGE) 1000 MG tablet TAKE 1 TABLET BY MOUTH TWICE DAILY WITH MEALS 180 tablet 0     metoprolol succinate ER (TOPROL-XL) 25 MG 24 hr tablet Take 12.5 mg by mouth At Bedtime       nitroGLYcerin (NITROSTAT) 0.4 MG sublingual tablet For chest pain place 1 tablet under the tongue every 5 minutes for 3 doses. If symptoms persist 5 minutes after 1st dose call 911. 25 tablet 0     omeprazole (PRILOSEC) 40 MG DR capsule TAKE 1 CAPSULE(40 MG) BY MOUTH DAILY 30 TO 60 MINUTES BEFORE A MEAL 90 capsule 0     order for DME Equipment being ordered: wheeled walker with hand breaks and seat 1 each 0     order for DME Equipment being ordered: True Matrix Blood Glucose meter. 1 Act 11     polyethylene glycol (MIRALAX/GLYCOLAX) Packet Take 17 g by mouth daily       polyethylene glycol-propylene glycol (SYSTANE ULTRA) 0.4-0.3 % SOLN ophthalmic solution Place 2 drops into both eyes daily as needed for dry eyes       sennosides (SENOKOT) 8.6 MG tablet Take 2 tablets by mouth daily       tamsulosin (FLOMAX) 0.4 MG capsule TAKE 1 CAPSULE BY MOUTH DAILY 90 capsule 3     tamsulosin (FLOMAX) 0.4 MG capsule Take 0.4 mg by mouth At Bedtime       ticagrelor (BRILINTA) 90 MG tablet Take 1 tablet (90 mg) by mouth 2 times daily 180 tablet 1     TRUE METRIX BLOOD GLUCOSE TEST test strip USE TO TEST THREE TIMES DAILY OR AS DIRECTED 300 strip 0     Allergies   Allergen Reactions     Oxycodone Other (See Comments)     \"TERRIBLE SWEATING\"     Sulfa Drugs      Tetracycline          Reviewed and updated as needed this visit by Provider         Review of Systems   ROS COMP: Constitutional, HEENT, cardiovascular, pulmonary, gi and gu systems are negative, except as otherwise noted.      Objective    BP 92/51 (BP Location: Right arm, Patient Position: Sitting, Cuff Size: Adult Regular)   Pulse " 86   Temp 97.6  F (36.4  C) (Oral)   Ht 1.829 m (6')   Wt 71.2 kg (157 lb)   SpO2 92%   BMI 21.29 kg/m    Body mass index is 21.29 kg/m .  Physical Exam   GENERAL: Chronically ill appearing, frail, in wheelchair today   RESP: lungs clear to auscultation - no rales, rhonchi or wheezes  CV: Heart with regular rate and rhythm.   ABDOMEN: soft, nontender, no hepatosplenomegaly, no masses and bowel sounds normal  MS: no gross musculoskeletal defects noted, no edema  NEURO: Forgetful, hard of hearing, Cranial nerves 2-12 appear grossly intact, symmetric strength, in wheelchair today   PSYCH: well groomed, appropriate mood and affect   Diabetic foot exam: normal DP and PT pulses, no trophic changes or ulcerative lesions and normal sensory exam; amputated toe noted     A1c pending         Assessment & Plan     1. Constipation, unspecified constipation type  I told his significant other to try TID miralax and suppository; call if that does not help   - bisacodyl (DULCOLAX) 10 MG suppository; Place 1 suppository (10 mg) rectally daily as needed for constipation  Dispense: 4 suppository; Refill: 11    2. 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 TIA (transient ischemic attack)  Stable; on antiplatelet therapy    4. Seizure disorder (H)  Stable on Keppra    5. Benign essential hypertension  Might be over-treated with dizziness symptoms; stop lisinopril     6. Hyperlipidemia LDL goal <70  On statin therapy; with LDL at goal in August     7. Benign prostatic hyperplasia without lower urinary tract symptoms  The flomax might be contributing to dizziness, but he is unwilling to stop this because it helps his LUTs              Return in about 3 months (around 6/5/2020).  Or sooner as needed     Matt Morrissey MD  [facility]    Total face to face contact time was  greater than 25 minutes of which more than 50% of this time was spent counseling and coordinating care regarding the above topics.

## 2020-03-05 NOTE — RESULT ENCOUNTER NOTE
The following letter pertains to your most recent diagnostic tests:    -Your hemoglobin A1c test which is a diabetes blood test that represents and average of your blood sugars over the last 3 months returned at 8.3 which is a little above your goal of hemoglobin A1c less than 8.       Bottom line:  For now, given the frequent left sided weakness spells, I would stick with the current insulin.  We should recheck in 3 months.  I will likely see you when you get out of the hospital.            Sincerely,    Dr. Morrissey

## 2020-03-05 NOTE — LETTER
[facility]  [address] [facility]  [address]  [place]  [address]  Tel: [phone]    March 5, 2020    Dustin Pearce  [address] [facility] [address]        Dear Mr. Pearce,    The results of your recent -Your hemoglobin A1c test which is a diabetes blood test that represents and average of your blood sugars over the last 3 months returned at 8.3 which is a little above your goal of hemoglobin A1c less than 8.       Bottom line:  For now, given the frequent left sided weakness spells, I would stick with the current insulin.  We should recheck in 3 months.  I will likely see you when you get out of the hospital.            Sincerely,    Dr. Morrissey/RUSSEL      Enclosure: Lab Results  Results for orders placed or performed during the hospital encounter of 03/05/20   CT Head w/o Contrast     Status: None    Narrative    CT SCAN OF THE HEAD WITHOUT CONTRAST   3/5/2020 2:31 PM     HISTORY: Code stroke.    TECHNIQUE:  Axial images of the head and coronal reformations without  IV contrast material. Radiation dose for this scan was reduced using  automated exposure control, adjustment of the mA and/or kV according  to patient size, or iterative reconstruction technique.    COMPARISON: Head CT 8/24/2019, 12/31/2019    FINDINGS:  Moderate parenchymal volume loss is present. Patchy and  confluent white matter hypoattenuation likely represents advanced  chronic small ischemic change. Old left middle cerebral artery  distribution frontal operculum infarct is present. No evidence of  acute ischemia, hemorrhage, mass, mass effect, or hydrocephalus. The  visualized calvarium, tympanic cavities, mastoid cavities, and  paranasal sinuses are unremarkable.      Impression    IMPRESSION:   1. No evidence of acute ischemia or hemorrhage.  2. 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    Findings were discussed by phone between Dr. Lundy and Dr. Aguilar  at 2:35 PM on 3/5/2020.    LEATHA LUNDY MD   CTA Head Neck with Contrast      Status: None (Preliminary result)    Narrative    CT ANGIOGRAM OF THE HEAD AND NECK WITH CONTRAST  3/5/2020 2:41 PM     HISTORY: Code Stroke.     TECHNIQUE:  CT angiography with an injection of 70 mL Isovue-370 IV  with scans through the head and neck. Images were transferred to a  separate 3-D workstation where multiplanar reformations and 3-D images  were created. Estimates of carotid stenoses are made relative to the  distal internal carotid artery diameters except as noted. Radiation  dose for this scan was reduced using automated exposure control,  adjustment of the mA and/or kV according to patient size, or iterative  reconstruction technique.    COMPARISON: CTA of the head and neck 8/24/2019     CT ANGIOGRAM HEAD FINDINGS:    Left internal carotid artery is occluded with reconstitution at the  supraclinoid segment across communicating arteries. Complete [place] of  [place] is present. The right internal coronary, anterior cerebral  arteries, and right middle cerebral artery is patent. Left middle  cerebral artery is patent but asymmetrically smaller compared to the  right. The bilateral vertebral arteries, basilar artery, and posterior  cerebral arteries are patent. Mild stenosis is present along the left  posterior cerebral artery P2 segment, unchanged. No aneurysms are  identified.     CT ANGIOGRAM NECK FINDINGS:   A three-vessel aortic arch is present with heavy atherosclerotic  plaquing. Mild stenosis is present at the left common carotid artery  origin. The bilateral common carotid arteries are patent. Prominent  right-sided internal carotid artery tonsillar loop is present with  associated mild stenosis. Moderate-to-severe plaque is present  throughout the length of the left common carotid artery. The left  internal carotid artery is occluded at the origin. The right internal  carotid artery and bilateral external carotid arteries are patent. The  bilateral vertebral arteries are patent. Right vertebral  artery is  dominant.     Moderate-to-severe cervical spine degenerative change is present.  Centrilobular emphysema noted.       Impression    IMPRESSION:     CTA HEAD:  1. 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  2. Mild stenosis of the left posterior cerebral artery P2 segment,  unchanged.    CTA NECK:  1. 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   Basic metabolic panel     Status: Abnormal   Result Value Ref Range    Sodium 139 133 - 144 mmol/L    Potassium 4.4 3.4 - 5.3 mmol/L    Chloride 107 94 - 109 mmol/L    Carbon Dioxide 25 20 - 32 mmol/L    Anion Gap 7 3 - 14 mmol/L    Glucose 228 (H) 70 - 99 mg/dL    Urea Nitrogen 20 7 - 30 mg/dL    Creatinine 1.08 0.66 - 1.25 mg/dL    GFR Estimate 59 (L) >60 mL/min/[1.73_m2]    GFR Estimate If Black 69 >60 mL/min/[1.73_m2]    Calcium 9.6 8.5 - 10.1 mg/dL   CBC with platelets differential     Status: Abnormal   Result Value Ref Range    WBC 10.4 4.0 - 11.0 10e9/L    RBC Count 4.49 4.4 - 5.9 10e12/L    Hemoglobin 12.4 (L) 13.3 - 17.7 g/dL    Hematocrit 40.2 40.0 - 53.0 %    MCV 90 78 - 100 fl    MCH 27.6 26.5 - 33.0 pg    MCHC 30.8 (L) 31.5 - 36.5 g/dL    RDW 14.2 10.0 - 15.0 %    Platelet Count 195 150 - 450 10e9/L    Diff Method Automated Method     % Neutrophils 69.0 %    % Lymphocytes 21.2 %    % Monocytes 9.3 %    % Eosinophils 0.1 %    % Basophils 0.1 %    % Immature Granulocytes 0.3 %    Nucleated RBCs 0 0 /100    Absolute Neutrophil 7.2 1.6 - 8.3 10e9/L    Absolute Lymphocytes 2.2 0.8 - 5.3 10e9/L    Absolute Monocytes 1.0 0.0 - 1.3 10e9/L    Absolute Eosinophils 0.0 0.0 - 0.7 10e9/L    Absolute Basophils 0.0 0.0 - 0.2 10e9/L    Abs Immature Granulocytes 0.0 0 - 0.4 10e9/L    Absolute Nucleated RBC 0.0    INR     Status: None   Result Value Ref Range    INR 1.07 0.86 - 1.14   Partial thromboplastin time     Status: None   Result Value Ref Range    PTT 28 22 - 37 sec   Troponin I     Status: None   Result Value Ref  Range    Troponin I ES 0.033 0.000 - 0.045 ug/L   EKG 12 lead     Status: None   Result Value Ref Range    Interpretation ECG Click View Image link to view waveform and result    Results for orders placed or performed in visit on 03/05/20   Hemoglobin A1c     Status: Abnormal   Result Value Ref Range    Hemoglobin A1C 8.3 (H) 0 - 5.6 %

## 2020-03-05 NOTE — ED AVS SNAPSHOT
Emergency Department  [address] [address]  Phone:  [phone]  Fax:  [phone]                                    Dustin Pearce   MRN: [id number]    Department:   Emergency Department   Date of Visit:  3/5/2020           After Visit Summary Signature Page    I have received my discharge instructions, and my questions have been answered. I have discussed any challenges I see with this plan with the nurse or doctor.    ..........................................................................................................................................  Patient/Patient Representative Signature      ..........................................................................................................................................  Patient Representative Print Name and Relationship to Patient    ..................................................               ................................................  Date                                   Time    ..........................................................................................................................................  Reviewed by Signature/Title    ...................................................              ..............................................  Date                                               Time          22EPIC Rev 08/18

## 2020-03-05 NOTE — PROGRESS NOTES
Patient unable to leave urine sample, sent home with urine container    Order reordered as future lab

## 2020-03-05 NOTE — ED PROVIDER NOTES
"  History     Chief Complaint:  Neurologic Problem    HPI   Dustin Pearce is a 89 year old male with a history of seizures on Keppra, TIAs and strokes on aspirin and brilinta, coronary artery disease, diabetes mellitus type 2, NSTEMI, and acute on chronic systolic congestive heart failure who presents with left-sided weakness and confusion. Per his wife, he has a history of left-sided droop.    Per internal medicine nurse and wife, the patient was at an internal medicine appointment 20 minutes prior to arrival when he had sudden onset confusion with increased left facial droop and left-sided weakness. His wife reports that he was \"fidgety\" and his doctor was concerned about possible seizure. She states that he had weakness while moving and he slurred his words. His wife states that his speech and his overall weakness has improved between onset of symptoms and getting to the Emergency Department.     Allergies:  Oxycodone  Sulfa Drugs  Tetracycline    Medications:    Glucophage  Prilosec  Flomax  Brilinta  Cymbalta  Senokot  Keppra  Basaglar kwikpen    Past Medical History:    Abdominal aortic aneurysm  Acute on chronic systolic congestive heart failure  Anemia  Aortic stenosis  Hypertension  Benign non-nodular prostatic hyperplasia with lower urinary tract symptoms  Coronary artery disease  Carotid artery stenosis  Cerebral vascular accident  Cognitive impairment  Depression  Diabetes mellitus type 2  Diabetic ulcer of left foot  Gastro-oesophageal reflux disease  Heart attack  Hyperlipidemia  Ischemic cardiomyopathy  Nephrolithiasis  NSTEMI  Osteopenia  PAD  Paroxysmal atrial fibrillation  RBBB with left anterior fascicular block  Stroke  TIA  Cerebral artery occlusion with cerebral infarction  Urinary tract infection  Ventricular tachycardia    Past Surgical History:    Amputate foot (L)  Cholecystectomy  Endarterectomy carotid (R)  Kidney stone removal (x4)  Hernia repair  Incision and drainage foot (L)  Laser " holmium lithotripsy ureter stent placement (R)  Rotator cuff repair    Family History:    Breast Cancer (Mother)  Heart Failure (Father)    Social History:  Smoking status: Former, quit 1/1/1999  Alcohol use: Not Currently  Drug use: No  PCP: Matt Morrissey  Presents to the ED with wife  Marital Status:   [4]    Review of Systems   Neurological: Positive for facial asymmetry, speech difficulty and weakness.   Psychiatric/Behavioral: Positive for confusion.   All other systems reviewed and are negative.      Physical Exam     Patient Vitals for the past 24 hrs:   BP Pulse Heart Rate Resp SpO2 Weight   03/05/20 1500 -- -- 71 16 97 % --   03/05/20 1445 (!) 154/60 -- 71 17 97 % --   03/05/20 1412 134/81 76 -- -- -- --   03/05/20 1410 -- -- -- -- -- 67.9 kg (149 lb 11.1 oz)       Physical Exam  General: Alert and cooperative with exam. Patient in mild distress.   Head:  Scalp is NC/AT  Eyes:  No scleral icterus, PERRL, EOMI   ENT:  The external nose and ears are normal. The oropharynx is normal and without erythema; mucus membranes are moist. Uvula midline  Neck:  Normal range of motion without rigidity.  CV:  Regular rate and rhythm  Resp:  Breath sounds are clear bilaterally    Non-labored, no retractions or accessory muscle use  GI:  Abdomen is soft, no distension, no tenderness. No peritoneal signs  MS:  No lower extremity edema   Skin:  Warm and dry, No rash or lesions noted.  Neuro:  Mild receptive aphasia.     Strength and sensation grossly intact in all extremities except for left hand.    Weakness of left       Cranial nerves 2-12 intact with exception of     Mild left facial droop    GCS: 15    Emergency Department Course   ECG (14:44:02):  Rate 73 bpm. SD interval 178. QRS duration 134. QT/QTc 436/480. P-R-T axes 71 -73 60. Normal sinus rhythm. Left axis deviation. Right bundle branch block. Septal infarct, age undetermined. Abnormal ECG. No significant change compared to EKG dated 12/31/19.  Interpreted at 1450 by Onel Aguilar DO.     Imaging:  Radiographic findings were communicated with the patient and family who voiced understanding of the findings.    Chest XR, PA & LAT  Minimal scarring or atelectasis in the lingula, similar to  previous. The lungs are otherwise clear. No acute findings. Heart size  normal.  As read by Radiology.    CTA Head Neck w/o Contrast  CTA HEAD:  1. 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  2. Mild stenosis of the left posterior cerebral artery P2 segment,  unchanged.     CTA NECK:  1. 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  As read by Radiology.    MR Brain w/o & w Contrast  1. No evidence of acute ischemia or hemorrhage.  2. Chronic small vessel ischemic change.  3. Multiple old infarcts.  As read by Radiology.    CT Head w/o Contrast  1. No evidence of acute ischemia or hemorrhage.  2. 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  As read by Radiology.    Laboratory:  CBC: HGB 12.4 (L) o/w WNL (WBC 10.4, )  BMP: Glucose 228 (H), GFR 59 (L) o/w WNL (Creatinine 1.08)  Keppra level: Pending  Lipase: 28  INR: 1.07  Troponin I (Collected 1411): 0.033  Troponin I (Collected 1610): 0.040  UA: Glucose 30, Protein albumin 30, Mucous present o/w WNL    Procedures:  None    Emergency Department Course:  1402: The patient arrived to the ED via EMS. He was brought emergently to the stabilization room where myself, tech, nurse, and RT were awaiting his arrival.   1402: I performed an exam of the patient, as documented above.   Past medical records, nursing notes, and vitals reviewed.     1404: Code stroke called.    1407: I spoke to Dr. Garduno on call for neurology.    The patient was sent for CT/CTA head neck, results above.     Patient returned from CT.      1436: I spoke to Dr. Estrada of radiology and discussed results of the imaging. No masses or hemorrhage on CT.     1525: I spoke to Dr. Garduno of neurology again who will come to  the ED to assess the patient.     1558: I rechecked the patient.    1646: I rechecked the patient. He reports feeling nauseated.    1743: I rechecked the patient. Explained findings to the patient and wife.    1815: I rechecked the patient.  Findings and plan explained to the Patient and spouse. Patient discharged home with instructions regarding supportive care, medications, and reasons to return. The importance of close follow-up was reviewed.     Impression & Plan      Medical Decision Making:  Patient is a 89-year-old male who presents with sudden onset neurologic deficit (slurred speech, left-sided weakness, left facial droop, and mild receptive aphasia).  Patient's medical history and records were reviewed.  Initial consideration for, but not limited to, TIA/CVA, hypoglycemia, metabolic disturbance, electrolyte deficiency, arrhythmia, seizure, among others.  On evaluation patient's symptoms were noted to be rapidly improving and he had only mild receptive aphasia, mild left  weakness and mild left facial droop on initial evaluation.  Code stroke was initiated.  CT/CTA imaging demonstrates no acute pathology; see complete results above.  Patient does have known seizure disorder and history of previously similar spells.  Family notes compliance with Keppra; Keppra level ordered and pending.  MRI without evidence of acute pathology.  Labs essentially unremarkable as noted above with exception of hyperglycemia (glucose 228).  Patient's wife had noted that over the last 3 days he had complained of some mild chest pressure; patient denies this.  EKG obtained and shows no evidence of acute ischemia, infarction, or arrhythmia.  Troponin normal x2 and patient asymptomatic.  After discussion with stroke neurology service it is felt that episode likely represents seizure.  Recommended continued compliance with Keppra and close follow-up with outpatient neurology as patient is returned to his neurologic baseline and  is currently asymptomatic. Return precautions were discussed.  Patient discharged home with family.    Diagnosis:    ICD-10-CM    1. Transient neurologic deficit R29.818      Disposition:  discharged to home     Sven Guzman  3/5/2020    EMERGENCY DEPARTMENT  Sven BOYD, am serving as a scribe at 2:02 PM on 3/5/2020 to document services personally performed by Onel Aguilar DO based on my observations and the provider's statements to me.        Onel Aguilar DO  03/05/20 [id number]

## 2020-03-06 PROBLEM — R41.82 ALTERED MENTAL STATUS: Status: ACTIVE | Noted: 2020-01-01

## 2020-03-06 NOTE — PLAN OF CARE
Pt here with AMS, recurrent slurred speech, left facial droop, suspected possible subclinical seizures. MRI neg. Pt A&O x3, confused to situation. VSS, on RA. LS clear. Tele SR w/ BBB. Denies pain. 1 episode of slurred speech, L facial droop mid morning, Dr. Chaney notified, repeat EEG completed, speech reassessed, and 1x dose of 1000mg of IV Keppra given. CMS and Neuro's returning to baseline, intact except patient is slow to respond and confusion. Up A1 GB and walker to bathroom and halls. Voiding adequately. +BS, passing flatus, senna and Miralax given, no BM this shift. Mod CHO diet, , 118, 159 sliding scale insulin given. ECHO completed, see results. Pt scoring green on the Aggression Stop Light Tool. Recommending TCU vs home, pending. Nrsg will continue to monitor.

## 2020-03-06 NOTE — H&P
Admitted:     03/05/2020      PRIMARY CARE PHYSICIAN:  Matt Morrissey MD      PRIMARY NEUROLOGIST:  Dr. Art Padilla.      CHIEF COMPLAINT:  Facial droop and slurred speech, multiple admissions.      HISTORY OF PRESENT ILLNESS:  Dustin Pearce is a 89-year-old  gentleman with history of seizure disorder, on Keppra, TIA, strokes on aspirin, Brilinta, heart disease, diabetes, acute on chronic systolic heart failure who presents to [facility] for the second time with symptoms of left facial droop, slurred speech, and confusion.  The patient initially presented earlier in the day with left facial droop, slurred speech.  The patient was admitted to the Emergency Department, a code stroke was called.  There was question about some aphasia and left-sided weakness.  The patient had a stroke workup done including CT angiogram which showed chronic carotid occlusion.  MRI was negative for stroke.  The patient improved.  There was thought that the patient could have had a seizure.  He was discharged with reassurance  after a stroke workup.  The patient's wife drove him back home and before he got out of the car, the patient had another episode where he seemed to have lost consciousness and slumped over.  She did notice once again the patient was poorly responsive, slurred speech.  This lasted for another 20 minutes.  There is no seizure or shaking activity, but the patient is not known typically to have clonic jerking with the seizures.  The patient was brought in again for the second time to [facility].  The patient was seen again by Dr. Ty Aguilar.  The patient once again is afebrile, slightly hypertensive.  EKG shows normal sinus rhythm with a right bundle branch block that is unchanged.  Normal oxygen saturation.  Blood work was not repeated, but the blood work from earlier in the day showed normal electrolytes, BUN of 20, creatinine 1.08 with calculated GFR 59.  Troponin is  0.033.  Calcium was 9.6.  CBC showed white count 10.4, hemoglobin 12.4, platelets 195,000.  Glucose was 228.  INR was 1.07.  The earlier head CT showed no acute ischemia or hemorrhage.  There is an old left frontal infarct.  CT angio of the head and neck showed chronic occlusion of the right and left internal carotid artery that is unchanged, mild stenosis of the left posterior cerebral artery P2 segment is unchanged.  An MRI of the brain showed no evidence of acute ischemia or hemorrhage and small vessel ischemic disease and multiple old infarcts noted.  Urinalysis also had 3 white cells, 2 red cells and 30 of glucose.  A 2-view chest x-ray showed lungs to be clear with minimal scarring or atelectasis in the lingula.  Heart size was normal.  After his second admission, the patient had been loaded with 1 gram of Keppra and is being admitted under neuro observation after discussion with Neurology.      PAST MEDICAL HISTORY:   1.  AAA.   2.  Acute on chronic CHF.   3.  Anemia.   4.  Aortic stenosis.   5.  Hypertension.   6.  Benign nodule prostatic hyperplasia with lower urinary tract symptoms.   7.  Coronary artery disease and carotid artery stenosis and a stroke with left frontal infarct.   8.  Cognitive impairment.   9.  Depression.   10.  Diabetes type 2 and a diabetic ulcer of the left foot.   11.  Atherosclerotic cardiovascular disease.   12.  Hyperlipidemia.   13.  Ischemic cardiomyopathy.   14.  Nephrolithiasis.   15.  Osteopenia.   16.  Peripheral artery disease.   17.  Paroxysmal atrial fibrillation.   18.  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   19.  History of stroke, TIA.   20.  History of UTI.   21.  History of V-tach.      PAST SURGICAL HISTORY:   1.  Left foot amputation.     2.  Cholecystectomy.   3.  Carotid endarterectomy on the right.   4.  Left kidney stone removal x 4.   5.  Hernia repair.   6.  I and D of the foot.   7.  Laser holmium lithotripsy  with ureteral stent placement.   8.  Rotator cuff repair.      FAMILY HISTORY:  Mother with breast cancer, father with heart failure.      SOCIAL HISTORY:  The patient is .  Quit tobacco in 1999.  No alcohol.  He is full code.      ALLERGIES:  CODEINE, SULFA DRUGS, AND TETRACYCLINE.      CURRENT MEDICATIONS:  Include:   1.  Tylenol.   2.  Aspirin.   3.  Atorvastatin.   4.  Dulcolax.   5.  Duloxetine .   6.  Basaglar 26 units.   7.  Afrin nasal spray.   8.  Keppra.   9.  Magnesium oxide.   10.  Metformin.   11.  Metoprolol.   12.  Nitroglycerin.   13.  Omeprazole.   14.  MiraLax.   15.  Systane eyedrops.   16.  Tamsulosin.   17.  Brilinta.      REVIEW OF SYSTEMS:  Ten points reviewed and as dictated in history of present illness, otherwise negative.      PHYSICAL EXAMINATION:   VITAL SIGNS:  Temperature 97.6, heart rate 84, respirations 15, blood pressure 140/114, sats 93% on room air.   GENERAL:  The patient is hard of hearing, pleasant 89-year-old  gentleman who is pleasant, cooperative, in no distress.   HEENT:  Pupils equal.  Sclerae are anicteric.  Oropharynx without lesions.  Mucous membranes are moist.   NECK:  No cervical lymphadenopathy.  No JVP elevation.   CHEST:  Clear to auscultation.   CARDIOVASCULAR:  S1, S2, regular rate and rhythm.   ABDOMEN:  Soft, distended but overall soft.  Normoactive bowel sounds.   MUSCULOSKELETAL:  No edema.   NEUROLOGIC:  He is able to move all 4 extremities.  Cranial nerves grossly intact.   SKIN:  Warm, dry, well perfused.   PSYCHIATRIC:  Mood and affect, stable.      LABORATORY DATA:  As dictated in history of present illness.      ASSESSMENT:  Dustin Pearce is 89 with a complicated cardiac and neurological history with chronic carotid stenosis with 100% occlusion and a prior infarction admitted with multiple episodes of recurrent facial droop, slurred speech, and weakness with a negative stroke workup, thought to be maybe possible breakthrough seizures,  being admitted under neuro observation status.      PLAN:   1.  Recurrent slurred speech, left facial droop, suspected possible subclinical seizures.  The patient will be admitted to the neuro floor with a stroke protocol on telemetry bed. 500 mg of Keppra twice a day.  The patient did receive 1 gram of Keppra IV loading dose.  We will increase the dose to 750 twice a day.  We will obtain an EEG in the morning, obtain a formal Neurology consultation as well.  For now, the patient has a completely normal baseline neuro exam without any slurred speech or any facial droop.  We will allow him to have a diet.   2.  Atherosclerotic cardiovascular disease.  The patient will be continued on his dual antiplatelet therapy with aspirin, Brilinta as well as all his other cardiac medications.   3.  History of benign prostatic hypertrophy.  We will continue the patient on his tamsulosin.   4.  Gastroesophageal reflux disease.  The patient will be continued on Prilosec.   5.  Diabetes.  We will continue the patient on his Basaglar and cover with sliding scale insulin.    6.  Depression and anxiety.  We will continue the patient on Cymbalta.   7.  Deep venous thrombosis prophylaxis.  The patient with compression boots.      CODE STATUS:  Full.         JACK JIN MD             D: 2020   T: 2020   MT: JOAQUÍN      Name:     SID AGUIAR   MRN:      [id number]        Account:      [id number]   :      1928        Admitted:     2020                   Document: [id number]       cc: Matt Morrissey MD

## 2020-03-06 NOTE — PLAN OF CARE
SLP: Attempted swallow evaluation this AM during pt's breakfast meal. While I was in the room setting up his breakfast his s/o stated his speech had acutely worsened. Left lateral drooling notably worsened. Recommend NPO status d/t acute neuro change. Will follow up.

## 2020-03-06 NOTE — PROGRESS NOTES
03/06/20 0904   Quick Adds   Type of Visit Initial Occupational Therapy Evaluation   Living Environment   Lives With spouse   Living Arrangements   ([facility] home)   Home Accessibility stairs within home   Transportation Anticipated family or friend will provide  (Pt does not drive. )   Living Environment Comment Spouse is available to assist 24/7.    Self-Care   Usual Activity Tolerance good   Current Activity Tolerance moderate   Equipment Currently Used at Home cane, straight;shower chair;grab bar, tub/shower;walker, rolling   Activity/Exercise/Self-Care Comment Pt uses a cane in the home and a walker outside of the home.    Functional Level   Ambulation 1-->assistive equipment   Transferring 0-->independent   Toileting 0-->independent   Bathing 1-->assistive equipment  (shower chair)   Dressing 0-->independent   Fall history within last six months yes   Number of times patient has fallen within last six months 3   General Information   Onset of Illness/Injury or Date of Surgery - Date 03/05/20   Referring Physician Blaze Martinez MD   Patient/Family Goals Statement home   Additional Occupational Profile Info/Pertinent History of Current Problem Per chart:  Facial droop and slurred speech, multiple admissions. MRI brain shows no stroke. see chart for details. Work up pending.    General Info Comments Pt is Hard of hearing.    Cognitive Status Examination   Orientation orientation to person, place and time   Level of Consciousness alert   Memory impaired   Cognitive Comment Spouse reported that pt has difficulty with cogniton at baseline.    Visual Perception   Visual Perception Wears glasses  (Glassess for near )   Visual Perception Comments Per spouse macular degeneration in B eyes.    Sensory Examination   Sensory Quick Adds No deficits were identified   Pain Assessment   Patient Currently in Pain Yes, see Vital Sign flowsheet   Range of Motion (ROM)   ROM Quick Adds No deficits were identified    Strength   Manual Muscle Testing Quick Adds No deficits were identified   Strength Comments BUE shoulder flexion/abduction- 4/5; Elbow flexion/extension 4/5.    Mobility   Bed Mobility Bed mobility skill: Supine to sit   Bed Mobility Skill: Supine to Sit   Level of [place]: Supine/Sit contact guard   Physical Assist/Nonphysical Assist: Supine/Sit 1 person assist;verbal cues;set-up required   Transfer Skills   Transfer Transfer Safety Analysis Bed/Chair;Transfer Skill: Stand to Sit;Transfer Safety Analysis Sit/Stand   Transfer Skill: Bed to Chair/Chair to Bed   Level of [place]: Bed to Chair contact guard   Physical Assist/Nonphysical Assist: Bed to Chair 1 person assist;verbal cues;set-up required   Transfer Skill: Sit to Stand   Level of [place]: Sit/Stand contact guard   Physical Assist/Nonphysical Assist: Sit/Stand 1 person assist;set-up required;verbal cues   Toilet Transfer   Toilet Transfer Toilet Transfer Skill;Toilet Transfer Safety Analysis   Transfer Skill: Toilet Transfer   Level of [place]: Toilet contact guard   Physical Assist/Nonphysical Assist: Toilet 1 person assist;verbal cues;set-up required   Instrumental Activities of Daily Living (IADL)   IADL Comments Spouse assists in IADLs (meal preparation, medications, driivng).    General Therapy Interventions   Planned Therapy Interventions ADL retraining;cognition;transfer training   Clinical Impression   Criteria for Skilled Therapeutic Interventions Met yes, treatment indicated   OT Diagnosis Decreased independence in ADLs.    Influenced by the following impairments Decreased independence in ADLs.    Assessment of Occupational Performance 1-3 Performance Deficits   Identified Performance Deficits Decreased independence in ADLs.  (dressing, bathing, toileting)   Clinical Decision Making (Complexity) Low complexity   Therapy Frequency Daily   Predicted Duration of Therapy Intervention (days/wks) 4 days   Anticipated Discharge  "Disposition Home with Assist;Home with Home Therapy   Risks and Benefits of Treatment have been explained. Yes   Patient, Family & other staff in agreement with plan of care Yes   [facility] TM \"6 Clicks\"   2016, Trustees of [facility], under license to [employer].  All rights reserved.   6 Clicks Short Forms Daily Activity Inpatient Short Form   [facility] AM-PAC  \"6 Clicks\" Daily Activity Inpatient Short Form   1. Putting on and taking off regular lower body clothing? 3 - A Little   2. Bathing (including washing, rinsing, drying)? 3 - A Little   3. Toileting, which includes using toilet, bedpan or urinal? 3 - A Little   4. Putting on and taking off regular upper body clothing? 4 - None   5. Taking care of personal grooming such as brushing teeth? 3 - A Little   6. Eating meals? 4 - None   Daily Activity Raw Score (Score out of 24.Lower scores equate to lower levels of function) 20   Total Evaluation Time   Total Evaluation Time (Minutes) 6     "

## 2020-03-06 NOTE — PLAN OF CARE
PT orders received & acknowledged. Chart reviewed. Eval completed & treatment initiated.     Patient lives in a [facility] house with his significant other. She is home during the daytime and able to help as needed. Patient has 2+7 steps to enter the home. Patient RAMONA with ambulation using cane & walker.     Discharge Planner PT   Patient plan for discharge: Spouse reports she doesn't think she would feel comfortable caring for patient with current level of mobility  Current status: Greeted patient sleeping in bed and agreeable to therapy upon awakening. Patient in & out of sleep while patient asking questions regarding PLOF, significant other providing additional information. Engaged patient in supine <> EOB at Oni with patient demonstrating slow & rigid movement. Engaged patient in sit <> stand with FWW at Oni with patient demonstrating posterior lean. Engaged patient in ambulation with FWW at CGA with patient taking very slow steps for a total distance of 200ft. Patient demonstrates 1 LOB while standing in the hallway patient starts leaning over to left side requiring modA from therapist to recover. Patient demonstrates decreased safety awareness for obstacles in hallway with ambulation. Patient following 50% of cues and requiring significant repetition in order to elicit appropriate respond. Engaged patient in ascending/descending 3 steps with bilateral railing at CGA.   Barriers to return to prior living situation: decreased ability to follow commands, decreased activity tolerance, below baseline, fall risk, unsteadiness with ambulation   Recommendations for discharge: TCU  Rationale for recommendations: Patient is below baseline for functional mobility & would benefit from continued physical therapy in the setting of a TCU for improving functional mobility, LE strength and tolerance for functional activities in order to return to baseline of functioning.          Entered by: Dena Van 03/06/2020  4:16 PM

## 2020-03-06 NOTE — ED TRIAGE NOTES
"Patient was seen here in the ED earlier this evening, wife brought him home and he began to have the same symptoms that prompted the first visit to the ED including speech difficulty and lack of coordinated motor function. Patient's wife said his \"speech was all screwed up again and he was slumped over in the car\". Wife was unable to get him out of the car at home so she brought him back to the ED. Patient's speech is clear here on arrival but staff had difficulty transferring patient from car to wheel chair.   "

## 2020-03-06 NOTE — PROCEDURES
Procedure Date: 2020      PORTABLE ELECTROENCEPHALOGRAM       ORDERING PROVIDER:  Blaze Martinez MD      EEG #FSH       DATE OF RECORDIN2020      CLINICAL SUMMARY:  The patient is a 89-year-old male with history of stroke, TIA, seizure, diabetes and heart disease who presented with left facial droop, slurred speech and confusion.  EEG was performed to evaluate for seizures.     TECHNICAL SUMMARY:  This EEG monitoring was performed with 23 scalp electrodes in the 10-20 system of placement and additional scalp, precordial and other surface electrodes were used for electrical referencing and artifact detection.      INTERICTAL ACTIVITIES:  During quiet wakefulness, there was 6 Hz theta activity over the posterior head regions which was symmetric and reactive.  Diffuse theta-delta slowing was present during waking.  No epileptiform discharges were present.      ACTIVATION MANEUVERS:  Not performed.      CLINICAL/ICTAL EVENTS:  No electrographic or clinical seizures were recorded.      IMPRESSION:  This is an abnormal portable awake EEG due to the presence of diffuse theta-delta slowing and slowing of the background consistent with moderate diffuse nonspecific encephalopathy.  No epileptiform discharges were present.  No electrographic or clinical seizures were recorded.  Clinical correlation advised.            NELSON FRANCO MD             D: 2020   T: 2020   MT: MARCELL      Name:     SID AGUIAR   MRN:      [id number]        Account:        [id number]   :      1928           Procedure Date: 2020      Document: [id number]

## 2020-03-06 NOTE — ED PROVIDER NOTES
"  History     Chief Complaint:  Neurologic Problem    HPI   Dustin Pearce is a 89 year old male with a history of seizures on Keppra, TIAs and stroke on aspirin and brilinta, coronary artery disease, diabetes mellitus type 2, NSTEMI, and acute on chronic systolic congestive heart failure who presents for the second time today with episode of confusion.  The patient was discharged from the ED two hours ago and the history is below. Since then, the patient's wife reports that on the way home, the patient started slumping over in his seat. She states she does not know if he lost consciousness as she was driving at the time. She reports when they got home she was unable to move him as he was still slumped over. She also notes that he was not making sense and was confused with slurred speech. She estimates the entire episode lasted 20 minutes and was similar to episode earlier today.  She denies shaking or seizure activity but notes that he typically does not shake during his seizures. He denies chest pain, shortness of breath, and abdominal pain.  Currently patient feels at his baseline.    HPI from 1402 3/5/2020  Dustin Pearce is a 89 year old male with a history of seizures on Keppra, TIAs and strokes on aspirin and brilinta, coronary artery disease, diabetes mellitus type 2, NSTEMI, and acute on chronic systolic congestive heart failure who presents with left-sided weakness and confusion. Per his wife, he has a history of left-sided droop.     Per internal medicine nurse and wife, the patient was at an internal medicine appointment 20 minutes prior to arrival when he had sudden onset confusion with a left facial droop and left-sided weakness. His wife reports that he was \"fidgety\" and his doctor was concerned about possible seizure. She states that he had weakness while moving and he slurred his words. His wife states that his speech and his overall weakness has improved between onset of symptoms and getting to the " Emergency Department.     Allergies:  Oxycodone  Sulfa Drugs  Tetracycline     Medications:    Glucophage  Prilosec  Flomax  Brilinta  Cymbalta  Senokot  Keppra  Nakular kwikpen     Past Medical History:    Abdominal aortic aneurysm  Acute on chronic systolic congestive heart failure  Anemia  Aortic stenosis  Hypertension  Benign non-nodular prostatic hyperplasia with lower urinary tract symptoms  Coronary artery disease  Carotid artery stenosis  Cerebral vascular accident  Cognitive impairment  Depression  Diabetes mellitus type 2  Diabetic ulcer of left foot  Gastro-oesophageal reflux disease  Heart attack  Hyperlipidemia  Ischemic cardiomyopathy  Nephrolithiasis  NSTEMI  Osteopenia  PAD  Paroxysmal atrial fibrillation  RBBB with left anterior fascicular block  Stroke  TIA  Cerebral artery occlusion with cerebral infarction  Urinary tract infection  Ventricular tachycardia     Past Surgical History:    Amputate foot (L)  Cholecystectomy  Endarterectomy carotid (R)  Kidney stone removal (x4)  Hernia repair  Incision and drainage foot (L)  Laser holmium lithotripsy ureter stent placement (R)  Rotator cuff repair     Family History:    Breast Cancer (Mother)  Heart Failure (Father)     Social History:  Smoking status: Former, quit 1/1/1999  Alcohol use: Not Currently  Drug use: No  PCP: Matt Morrissey  Presents to the ED with wife  Marital Status:   [4]    Review of Systems   Respiratory: Negative for shortness of breath.    Cardiovascular: Negative for chest pain.   Gastrointestinal: Negative for abdominal pain.   Neurological: Positive for weakness. Negative for facial asymmetry.   Psychiatric/Behavioral: Positive for confusion.   All other systems reviewed and are negative.      Physical Exam     Patient Vitals for the past 24 hrs:   BP Temp Temp src Pulse Heart Rate SpO2   03/05/20 2150 -- -- -- -- 79 --   03/05/20 2145 -- -- -- -- 80 --   03/05/20 2140 -- -- -- -- 82 --   03/05/20 2135 -- -- -- -- 83  --   03/05/20 2130 (!) 171/82 -- -- 85 83 --   03/05/20 2100 134/71 -- -- 89 87 --   03/05/20 2028 (!) 144/67 97.6  F (36.4  C) Oral 86 -- 96 %     Physical Exam  General: Alert and cooperative with exam. Patient in mild distress. Baseline mentation.  Head:  Scalp is NC/AT  Eyes:  No scleral icterus, PERRL, EOMI   ENT:  The external nose and ears are normal. The oropharynx is normal and without erythema; mucus membranes are moist. Uvula midline  Neck:  Normal range of motion without rigidity.  CV:  Regular rate and rhythm  Resp:  Breath sounds are clear bilaterally    Non-labored, no retractions or accessory muscle use  GI:  Abdomen is soft, no distension, no tenderness. No peritoneal signs  MS:  No lower extremity edema   Skin:  Warm and dry, No rash or lesions noted.  Neuro: Oriented and alert. No gross motor deficits.    Strength and sensation grossly intact in all 4 extremities.      Cranial nerves 2-12 intact.    GCS: 15     Emergency Department Course   ECG (20:49:31):  Rate 85 bpm. LA interval 172. QRS duration 126. QT/QTc 404/480. P-R-T axes 57 -86 69. Normal sinus rhythm. Left axis deviation. Right bundle branch block. Abnormal ECG. No significant change compared to EKG dated 3/5/20.  Interpreted at 2053 by Onel Aguilar DO.     Procedures:  None    Interventions:  2138: Keppra 1000 mg IV     Emergency Department Course:  Past medical records, nursing notes, and vitals reviewed.  2038: I performed an exam of the patient and obtained history, as documented above.  EKG performed, results above.  IV inserted and blood drawn.    Findings and plan explained to the Patient and spouse who consents to admission.     2141: Discussed the patient with Dr. Martinez, who will admit the patient to an observation unit bed for further monitoring, evaluation, and treatment.     Impression & Plan    Medical Decision Making:  Patient is a 89-year-old male who presents to the emergency department with transient  [0] : 2) Feeling down, depressed, or hopeless: Not at all (0) neurologic deficit (slurred speech, facial droop, and possible brief syncope/near syncope); discharged from ED earlier today after negative work-up for similar episode and concern for symptoms secondary to seizure.  On evaluation patient's neurologic is at baseline and he is without complaint.  Repeat EKG shows no change from earlier today.  Patient's Keppra level ordered earlier today is still pending.  No indication for further emergent labs or imaging at this time.  Patient was loaded with 1 g Keppra as there is concern that these spells may be indicative of seizure.  Patient will be admitted to observation with the hospitalist service for further evaluation and care.  At the time of admission patient was at his neurologic baseline, afebrile, and asymptomatic.    Diagnosis:    ICD-10-CM    1. Transient neurologic deficit R29.818        Disposition:  Admitted to observation unit    Sven Guzman  3/5/2020    EMERGENCY DEPARTMENT  Sven BOYD am serving as a scribe at 8:38 PM on 3/5/2020 to document services personally performed by Onel Aguilar DO based on my observations and the provider's statements to me.      Onel Aguilar DO  03/05/20 5722

## 2020-03-06 NOTE — PROGRESS NOTES
STAT Portable EEG [id number] completed.  Patient was awake, and drowsy.   No photic/HV done due to health/age  Ordered by Dr. Chaney

## 2020-03-06 NOTE — PROGRESS NOTES
03/06/20 1540   Quick Adds   Type of Visit Initial PT Evaluation   Living Environment   Lives With significant other   Living Arrangements house  ([facility])   Home Accessibility stairs to enter home;stairs within home   Number of Stairs, Main Entrance 2   Stair Railings, Main Entrance railing on left side (ascending)   Number of Stairs, Within Home, Primary 7   Stair Railings, Within Home, Primary railing on right side (ascending)   Transportation Anticipated family or friend will provide   Living Environment Comment Patient lives in a split level town house with his significant other. She is home during the daytime and able to help as needed. Patient has 2+7 steps to enter the home.   Self-Care   Usual Activity Tolerance good   Current Activity Tolerance moderate   Equipment Currently Used at Home cane, straight;shower chair;grab bar, tub/shower;walker, rolling   Activity/Exercise/Self-Care Comment Patient RAMONA with ambulation using cane & walker.    Functional Level Prior   Ambulation 1-->assistive equipment   Transferring 1-->assistive equipment   Fall history within last six months yes   Number of times patient has fallen within last six months 3   General Information   Onset of Illness/Injury or Date of Surgery - Date 03/05/20   Referring Physician Blaze Martinez Daynan, MD   Patient/Family Goals Statement To improve to baseline   Pertinent History of Current Problem (include personal factors and/or comorbidities that impact the POC) 89-year-old  gentleman with history of seizure disorder, on Keppra, TIA, strokes on aspirin, Brilinta, heart disease, diabetes, acute on chronic systolic heart failure who presents to [facility] for the second time with symptoms of left facial droop, slurred speech, and confusion.    (per Blaze Martinez Daynan, MD note)   Precautions/Limitations fall precautions   General Observations Greeted patient sleeping in bed with significant other at  bedside.   General Info Comments Activity: up with assist   Cognitive Status Examination   Orientation orientation to person, place and time   Level of Consciousness lethargic/somnolent   Follows Commands and Answers Questions 50% of the time;able to follow single-step instructions   Personal Safety and Judgment impaired   Cognitive Comment Patient demonstrates decreased safety awareness for obstacles in hallway with ambulation. Patient following 50% of cues and requiring significant repetition in order to elicit appropriate response   Pain Assessment   Patient Currently in Pain No   Integumentary/Edema   Integumentary/Edema no deficits were identifed   Posture    Posture Forward head position;Protracted shoulders   Range of Motion (ROM)   ROM Comment B LE WFL   Strength   Strength Comments B LE functionally weak   Bed Mobility   Bed Mobility Comments supine <> EOB at Oni    Transfer Skills   Transfer Comments  sit <> stand with FWW at Oni with patient demonstrating posterior lean   Gait   Gait Comments 10ft with FWW; significantly decreased otto & step length    Balance   Balance Comments decreased trunk stability noted with bed mobility; posterior lean with sit <> stand transfer requiring Oni for balance; 1 LOB with ambulation   Sensory Examination   Sensory Perception Comments denies numbness/tingling in LE   General Therapy Interventions   Planned Therapy Interventions balance training;bed mobility training;gait training;strengthening;transfer training;home program guidelines;progressive activity/exercise;neuromuscular re-education   Clinical Impression   Criteria for Skilled Therapeutic Intervention yes, treatment indicated   PT Diagnosis impaired functional mobility   Influenced by the following impairments generalized weakness; s/p stroke   Functional limitations due to impairments bed mobility, transfers, ambulation, stairs   Clinical Presentation Evolving/Changing   Clinical Presentation Rationale  "[facility], current presentation, clinical judgmenet   Clinical Decision Making (Complexity) Low complexity   Therapy Frequency Daily   Predicted Duration of Therapy Intervention (days/wks) 4 days   Anticipated Discharge Disposition Transitional Care Facility   Risk & Benefits of therapy have been explained Yes   Patient, Family & other staff in agreement with plan of care Yes   [facility] TM \"6 Clicks\"   2016, Trustees of [facility], under license to [employer].  All rights reserved.   6 Clicks Short Forms Basic Mobility Inpatient Short Form   [facility]  \"6 Clicks\" V.2 Basic Mobility Inpatient Short Form   1. Turning from your back to your side while in a flat bed without using bedrails? 3 - A Little   2. Moving from lying on your back to sitting on the side of a flat bed without using bedrails? 3 - A Little   3. Moving to and from a bed to a chair (including a wheelchair)? 3 - A Little   4. Standing up from a chair using your arms (e.g., wheelchair, or bedside chair)? 3 - A Little   5. To walk in hospital room? 3 - A Little   6. Climbing 3-5 steps with a railing? 3 - A Little   Basic Mobility Raw Score (Score out of 24.Lower scores equate to lower levels of function) 18   Total Evaluation Time   Total Evaluation Time (Minutes) 10     "

## 2020-03-06 NOTE — PHARMACY-ADMISSION MEDICATION HISTORY
Pharmacy Medication History  Admission medication history interview status for the 3/5/2020  admission is complete. See EPIC admission navigator for prior to admission medications     Medication history sources: Surescripts and spouse, Epic records  Medication history source reliability: Good  Adherence assessment: Good    Significant changes made to the medication list:  Lisinopril was removed by physician at office appointment today due to dizziness.    Additional medication history information:   none    Medication reconciliation completed by provider prior to medication history? No    Time spent in this activity: 30 minutes      Prior to Admission medications    Medication Sig Last Dose Taking? Auth Provider   acetaminophen (TYLENOL) 500 MG tablet Take 500-1,000 mg by mouth every 6 hours as needed for mild pain as needed Yes Unknown, Entered By History   aspirin EC 81 MG EC tablet Take 1 tablet (81 mg) by mouth daily 3/5/2020 at am Yes Tra Reynoso MD   atorvastatin (LIPITOR) 40 MG tablet Take 40 mg by mouth daily 3/5/2020 at Unknown time Yes Unknown, Entered By History   bisacodyl (DULCOLAX) 10 MG suppository Place 1 suppository (10 mg) rectally daily as needed for constipation as needed Yes Matt Morrissey MD   DULoxetine (CYMBALTA) 30 MG capsule TAKE 1 CAPSULE(30 MG) BY MOUTH DAILY 3/5/2020 at Unknown time Yes Matt Morrissey MD   insulin glargine (BASAGLAR KWIKPEN) 100 UNIT/ML pen Inject 26 Units Subcutaneous At Bedtime 3/4/2020 at Unknown time Yes Matt Morrissey MD   ipratropium (ATROVENT) 0.03 % nasal spray INHALE 1 SPRAY IN EACH NOSTRIL EVERY 12 HOURS AS NEEDED as needed Yes Matt Morrissey MD   levETIRAcetam (KEPPRA) 500 MG tablet Take 1 tablet (500 mg) by mouth 2 times daily 3/5/2020 at am Yes Blaze Rose MD   MAGNESIUM-OXIDE 400 (241.3 Mg) MG tablet TAKE 1 TABLET BY MOUTH TWICE DAILY 3/5/2020 at am Yes Matt Morrissey MD   metFORMIN (GLUCOPHAGE) 1000 MG tablet TAKE 1 TABLET BY  MOUTH TWICE DAILY WITH MEALS 3/5/2020 at Unknown time Yes Matt Morrissey MD   metoprolol succinate ER (TOPROL-XL) 25 MG 24 hr tablet Take 12.5 mg by mouth At Bedtime 3/4/2020 at Unknown time Yes Unknown, Entered By History   nitroGLYcerin (NITROSTAT) 0.4 MG sublingual tablet For chest pain place 1 tablet under the tongue every 5 minutes for 3 doses. If symptoms persist 5 minutes after 1st dose call 911. as needed Yes Brandyn Graves PA   omeprazole (PRILOSEC) 40 MG DR capsule TAKE 1 CAPSULE(40 MG) BY MOUTH DAILY 30 TO 60 MINUTES BEFORE A MEAL 3/5/2020 at am Yes Matt Morrissey MD   polyethylene glycol (MIRALAX) packet Take 17 g by mouth 3 times daily as needed for constipation as needed Yes Matt Morrissey MD   polyethylene glycol-propylene glycol (SYSTANE ULTRA) 0.4-0.3 % SOLN ophthalmic solution Place 2 drops into both eyes daily as needed for dry eyes as needed Yes Unknown, Entered By History   sennosides (SENOKOT) 8.6 MG tablet Take 2 tablets by mouth daily Past Week at Unknown time Yes Alexandre Petty APRN CNP   tamsulosin (FLOMAX) 0.4 MG capsule TAKE 1 CAPSULE BY MOUTH DAILY 3/4/2020 at pm Yes Matt Morrissey MD   ticagrelor (BRILINTA) 90 MG tablet Take 1 tablet (90 mg) by mouth 2 times daily 3/5/2020 at am Yes Matt Morrissey MD   blood glucose monitoring (NO BRAND SPECIFIED) meter device kit Use to test blood sugar bid times daily or as directed.   Matt Morrissey MD   order for DME Equipment being ordered: wheeled walker with hand breaks and seat   Matt Morrissey MD   order for DME Equipment being ordered: True Matrix Blood Glucose meter.   Matt Morrissey MD   TRUE METRIX BLOOD GLUCOSE TEST test strip USE TO TEST THREE TIMES DAILY OR AS DIRECTED   Matt Morrissey MD

## 2020-03-06 NOTE — PROGRESS NOTES
Routine EEG  completed at patient's bedside. Procedure explained to patient prior to testing.   Ordered by Dr Martinez

## 2020-03-06 NOTE — PROCEDURES
Procedure Date: 2020      PORTABLE ELECTROENCEPHALOGRAM       ORDERING PROVIDER:  ***       EEG #[id number]        DATE OF RECORDIN2020.      CLINICAL SUMMARY:  The patient is a 89-year-old male with history of stroke, TIA, seizure, diabetes and heart disease who presented with left facial droop, slurred speech and confusion.  EEG was performed to evaluate for seizures.      INTERICTAL ACTIVITIES:  During quiet wakefulness, there was 6 Hz theta activity over the posterior head regions which was symmetric and reactive.  Diffuse theta-delta slowing was present during waking.  No epileptiform discharges were present.      ACTIVATION MANEUVERS:  Not performed.      CLINICAL/ICTAL EVENTS:  No electrographic or clinical seizures were recorded.      IMPRESSION:  This is an abnormal portable EEG due to the presence of diffuse theta-delta slowing and slowing of the background consistent with moderate diffuse nonspecific encephalopathy.  No epileptiform discharges were present.  No electrographic or clinical seizures were recorded.  Clinical correlation advised.         NELSON FRANCO MD             D: 2020   T: 2020   MT: MARCELL      Name:     SID AGUIAR   MRN:      [id number]        Account:        [id number]   :      [id number]           Procedure Date: 2020      Document: [id number]

## 2020-03-06 NOTE — PLAN OF CARE
OT- Pt under Observation status. Evaluation and treatment initiated. Pt lives in a [facility] home with his spouse. Prior pt independent in all ADLs.   Discharge Planner OT   Patient plan for discharge: Home  Current status: Pt ambulated to the bathroom with CGA and walker and transferred to/from the toilet with CGA. While standing at the sink with CGA, pt completed 1 hygiene task.  Barriers to return to prior living situation: current level of a for ADLs  Recommendations for discharge: Home with 24/7 A/supervision for all I/ADLs and home OT   Rationale for recommendations: Significant other reported that she can provide  24/7 assist at home. Home therapy to address independence in ADLs and for home modifications. Home therapy appropriate as pt requires taxing effort to leave the home. If this level of assist is not available then TCU should be considered.        Entered by: Fadia Olivares 03/06/2020 9:59 AM

## 2020-03-06 NOTE — PLAN OF CARE
Pt here with r/o CVA vs seizure. Disoriented to situation. Neuros generalized weakness LUE slight weakness, confusion, baseline poor vision in right eye. VSS. Tele SR w/BBB. Regular diet, passed bedside swallow test. Takes pills whole with water. Up with A2/GB/W/pivot. Using urinal overnight. Denies pain. Pt scoring yellow on the Aggression Stop Light Tool, for confusion. Continue to monitor. Plan for EEG and echo today.

## 2020-03-06 NOTE — CONSULTS
[facility]    Neurology Consultation     Date of Admission:  3/5/2020      Assessment & Plan   Dustin Pearce is a 89 year old male who was admitted on 3/5/2020. I was asked to see the patient for repeated episodes of AMS, l facial droop, likely seizures, negative MRI brain today for acute changes.    --Give another keppra bolus 1000 mg IV now.  --previously did not tolerate Keppra 750 mg bid- was very tired/sleepy, but has tolerated lower doses.  Agree with current plan of Keppra 750 bid.  If he gets tired on that then will need to consider different aed such as dilantin, zonisamide.  I discussed with the patient and his wife 750 twice a day might be too low to control all seizures.  However giving him a higher dose likely would cause too much sedation.  We will try to 750 twice a day and if that does not control the seizures and will consider additional or different medication.  --Today back to baseline within 20 min, history and eeg change consistent with focal seizure.  No repeat imaging needed at this time.          I discussed the above diagnosis, assessment, and further testing with the patient.      Dee Chaney MD    Code Status    Prior        Reason for Consult   Reason for consult: I was asked by Dr. Jenkins to evaluate this patient for possible seizures.      Chief Complaint   Left sided weakness    History is obtained from the patient and the electronic medical chart.    History of Present Illness   Dustin Pearce is a 89 year old male who presents with history of seizure disorder, taking Keppra pta, TIA, strokes on asa and prilinta, hrt dz, dm 2, presents to the ER for episode of LOC, slumped over, no convulsive symptoms, witnessed by his wife in the car yesterday.  He was then noted to have slurred speech, left facial droop, confusion for about 20 min.  In ER, had stroke workup including MRI brain- nothing acute.    He had been discharged from the ED at [facility] just  prior to this (same day) after workup for left sided weakness and aphasia.  Today, he had another episode of left facial droop, slurred speech.  Resolved within an hour.  EEG prior to that event showed diffuse generalized slowing.  EEG just after the event showed R hemisphere >L hemisphere slowing.    He is now back to baseline.    Fist ER visit: CTA, MRI, labs done. (code stroke)  2nd ER visit: Keppra 1000 mg IV given.        Past Medical History   I have reviewed this patient's medical history and updated it with pertinent information if needed.   Past Medical History:   Diagnosis Date     Abdominal aortic aneurysm (H) 12/25/2016     Acute on chronic systolic congestive heart failure (H) 6/7/2018     Anemia 6/7/2018     Anemia due to blood loss, acute 6/13/2017     Aortic stenosis     mild     Benign essential hypertension 1/6/2017     Benign non-nodular prostatic hyperplasia with lower urinary tract symptoms 10/20/2016     CAD (coronary artery disease)     MI 1970     Carotid artery stenosis 10/20/2016    chronically occluded left internal carotid artery     Cerebrovascular accident (H) 10/20/2016     Cognitive impairment 6/7/2018     Coronary artery disease of native artery of native heart with stable angina pectoris (H) 10/20/2016    MI 1970     Depression, unspecified depression type 2/22/2018     Diabetes mellitus (H)      Diabetic ulcer of left foot associated with diabetes mellitus due to underlying condition (H) 6/12/2017     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     Gastro-oesophageal reflux disease      Gastroesophageal reflux disease without esophagitis 10/20/2016     Heart attack (H)      Hyperlipidemia      Hyperlipidemia, unspecified hyperlipidemia type 10/20/2016     Ischemic cardiomyopathy      Lumbar back pain 12/30/2016     Mild cognitive impairment 10/20/2016     Nephrolithiasis     RECURRENT     NSTEMI (non-ST elevated myocardial infarction) (H) 6/7/2018     Osteopenia       PAD (peripheral artery disease) (H)     peripheral angiogram 5/2017: The common iliac artery stenoses were stented and the superficial femoral artery stenoses were angioplastied     Paroxysmal atrial fibrillation (H)      Peripheral artery disease (H)     peripheral angiogram 5/2017: The common iliac artery stenoses were stented and the superficial femoral artery stenoses were angioplastied     RBBB with left anterior fascicular block      Slow transit constipation 2/22/2018     Stroke of unknown etiology (H) 12/31/2017     Syncope      TIA (transient ischemic attack) 1/20/2017     Unspecified cerebral artery occlusion with cerebral infarction      UTI (urinary tract infection) due to Enterococcus 2/22/2018     Ventricular tachycardia (H)     nonsustained       Past Surgical History   I have reviewed this patient's surgical history and updated it with pertinent information if needed.  Past Surgical History:   Procedure Laterality Date     AMPUTATE FOOT Left 6/4/2017    Procedure: AMPUTATE FOOT;  Sun Add#3: partial left foot amputation - Sagital Saw - Mini C-Arm;  Surgeon: Moe Kirk DPM;  Location:  OR     CHOLECYSTECTOMY       ENDARTERECTOMY CAROTID Right 1/3/2018    Procedure: ENDARTERECTOMY CAROTID;  RIGHT CAROTID ENDARTERECTOMY WITH EEG;  Surgeon: Roland Rodriguez MD;  Location:  OR     ESOPHAGOSCOPY, GASTROSCOPY, DUODENOSCOPY (EGD), COMBINED N/A 12/26/2016    Procedure: COMBINED ESOPHAGOSCOPY, GASTROSCOPY, DUODENOSCOPY (EGD);  Surgeon: Nasim Louis MD;  Location:  GI     GENITOURINARY SURGERY      KIDNEY STONE REMOVAL X 4     HC UGI ENDOSCOPY W EUS N/A 12/29/2016    Procedure: COMBINED ENDOSCOPIC ULTRASOUND, ESOPHAGOSCOPY, GASTROSCOPY, DUODENOSCOPY (EGD);  Surgeon: Nasim Louis MD;  Location:  GI     HERNIA REPAIR       INCISION AND DRAINAGE FOOT, COMBINED Left 6/6/2017    Procedure: COMBINED INCISION AND DRAINAGE FOOT;  REVISIONAL LEFT FOOT INCISION AND DRAINAGE  WITH POSSIBLE FLAP CLOSURE , ANTIBIOTIC SOLUTION ;  Surgeon: Nabil Ann DPM;  Location: [facility] OR     LASER HOLMIUM LITHOTRIPSY URETER(S), INSERT STENT, COMBINED  3/2/2012    Procedure:COMBINED CYSTOSCOPY, URETEROSCOPY, LASER HOLMIUM LITHOTRIPSY URETER(S), INSERT STENT; CYSTOSCOPY, RIGHT RETROGRADES, RIGHT URETEROSCOPY, HOLMIUM LASER, RIGHT STENT PLACEMENT; Surgeon:ANJELICA LEÓN; Location:[facility] OR     ROTATOR CUFF REPAIR RT/LT         Prior to Admission Medications   Prior to Admission Medications   Prescriptions Last Dose Informant Patient Reported? Taking?   DULoxetine (CYMBALTA) 30 MG capsule 3/5/2020 at Unknown time Spouse/Significant Other No Yes   Sig: TAKE 1 CAPSULE(30 MG) BY MOUTH DAILY   MAGNESIUM-OXIDE 400 (241.3 Mg) MG tablet 3/5/2020 at am Spouse/Significant Other No Yes   Sig: TAKE 1 TABLET BY MOUTH TWICE DAILY   TRUE METRIX BLOOD GLUCOSE TEST test strip  Spouse/Significant Other No No   Sig: USE TO TEST THREE TIMES DAILY OR AS DIRECTED   acetaminophen (TYLENOL) 500 MG tablet as needed Spouse/Significant Other Yes Yes   Sig: Take 500-1,000 mg by mouth every 6 hours as needed for mild pain   aspirin EC 81 MG EC tablet 3/5/2020 at am Spouse/Significant Other No Yes   Sig: Take 1 tablet (81 mg) by mouth daily   atorvastatin (LIPITOR) 40 MG tablet 3/5/2020 at Unknown time Spouse/Significant Other Yes Yes   Sig: Take 40 mg by mouth daily   bisacodyl (DULCOLAX) 10 MG suppository as needed Spouse/Significant Other No Yes   Sig: Place 1 suppository (10 mg) rectally daily as needed for constipation   blood glucose monitoring (NO BRAND SPECIFIED) meter device kit  Spouse/Significant Other No No   Sig: Use to test blood sugar bid times daily or as directed.   insulin glargine (BASAGLAR KWIKPEN) 100 UNIT/ML pen 3/4/2020 at Unknown time Spouse/Significant Other No Yes   Sig: Inject 26 Units Subcutaneous At Bedtime   ipratropium (ATROVENT) 0.03 % nasal spray as needed Spouse/Significant Other No Yes   Sig:  INHALE 1 SPRAY IN EACH NOSTRIL EVERY 12 HOURS AS NEEDED   levETIRAcetam (KEPPRA) 500 MG tablet 3/5/2020 at am Spouse/Significant Other No Yes   Sig: Take 1 tablet (500 mg) by mouth 2 times daily   metFORMIN (GLUCOPHAGE) 1000 MG tablet 3/5/2020 at Unknown time Spouse/Significant Other No Yes   Sig: TAKE 1 TABLET BY MOUTH TWICE DAILY WITH MEALS   metoprolol succinate ER (TOPROL-XL) 25 MG 24 hr tablet 3/4/2020 at Unknown time Spouse/Significant Other Yes Yes   Sig: Take 12.5 mg by mouth At Bedtime   nitroGLYcerin (NITROSTAT) 0.4 MG sublingual tablet as needed Spouse/Significant Other No Yes   Sig: For chest pain place 1 tablet under the tongue every 5 minutes for 3 doses. If symptoms persist 5 minutes after 1st dose call 911.   omeprazole (PRILOSEC) 40 MG DR capsule 3/5/2020 at am Spouse/Significant Other No Yes   Sig: TAKE 1 CAPSULE(40 MG) BY MOUTH DAILY 30 TO 60 MINUTES BEFORE A MEAL   order for DME  Spouse/Significant Other No No   Sig: Equipment being ordered: True Matrix Blood Glucose meter.   order for DME  Spouse/Significant Other No No   Sig: Equipment being ordered: wheeled walker with hand breaks and seat   polyethylene glycol (MIRALAX) packet as needed Spouse/Significant Other No Yes   Sig: Take 17 g by mouth 3 times daily as needed for constipation   polyethylene glycol-propylene glycol (SYSTANE ULTRA) 0.4-0.3 % SOLN ophthalmic solution as needed Spouse/Significant Other Yes Yes   Sig: Place 2 drops into both eyes daily as needed for dry eyes   sennosides (SENOKOT) 8.6 MG tablet Past Week at Unknown time Spouse/Significant Other No Yes   Sig: Take 2 tablets by mouth daily   tamsulosin (FLOMAX) 0.4 MG capsule 3/4/2020 at pm Spouse/Significant Other No Yes   Sig: TAKE 1 CAPSULE BY MOUTH DAILY   ticagrelor (BRILINTA) 90 MG tablet 3/5/2020 at am Spouse/Significant Other No Yes   Sig: Take 1 tablet (90 mg) by mouth 2 times daily      Facility-Administered Medications: None     Allergies   Allergies   Allergen  "Reactions     Oxycodone Other (See Comments)     \"TERRIBLE SWEATING\"     Sulfa Drugs      Tetracycline        Social History   I have reviewed this patient's social history and updated it with pertinent information if needed. Dustin Pearce  reports that he quit smoking about 21 years ago. His smoking use included cigarettes. He started smoking about 51 years ago. He has a 30.00 pack-year smoking history. He has never used smokeless tobacco. He reports previous alcohol use of about 7.0 standard drinks of alcohol per week. He reports that he does not use drugs.    Family History   I have reviewed this patient's family history and updated it with pertinent information if needed.   Family History   Problem Relation Age of Onset     Breast Cancer Mother      Heart Failure Father 81       Review of Systems   The 10 point Review of Systems is negative other than noted in the HPI.    Physical Exam   Temp: 98  F (36.7  C) Temp src: Oral BP: (!) 145/60 Pulse: 84 Heart Rate: 74 Resp: 16 SpO2: 94 % O2 Device: None (Room air)    Vital Signs with Ranges  Temp:  [97.6  F (36.4  C)-98.5  F (36.9  C)] 98  F (36.7  C)  Pulse:  [76-89] 84  Heart Rate:  [64-87] 74  Resp:  [15-18] 16  BP: (100-190)/() 145/60  SpO2:  [92 %-97 %] 94 %  0 lbs 0 oz    General Appearance:  No acute distress  Neuro:       Mental Status Exam:    A, A, oriented to self, place, off by 1 for the day       Cranial Nerves:   Cranial nerves II through XII examined and intact other than very mild left facial weakness           Motor:   5 out of 5 bilateral upper extremities and bilateral lower extremities           Reflexes: Bilateral upper extremities bilateral lower extremities       Sensory: Normal light touch x4                   Coordination:   Finger-nose-finger mild dysmetria bilaterally       Gait: Able to stand up from a chair on his own but needs to use his arms to push off.  Moderately wide-based, slow, uses walker  Neck: no nuchal rigidity. No " carotid bruits.    Extremities: No clubbing, no cyanosis, no edema  CV: RRR nl s1, s2  Resp: CTAB        Data   Results for orders placed or performed during the hospital encounter of 03/05/20 (from the past 24 hour(s))   EKG 12-lead, tracing only   Result Value Ref Range    Interpretation ECG Click View Image link to view waveform and result    Neurology IP Consult: Stroke (potential or actual); Patient to be seen: Routine - within 24 hours; sz vs cva; Consultant may enter orders: Yes; Requesting provider? Attending physician    Narrative    Diane Miranda PA-C     3/6/2020  8:28 AM  MRI brain shows no stroke, recommend general neurology eval for   seizure   Glucose by meter   Result Value Ref Range    Glucose 171 (H) 70 - 99 mg/dL   Troponin I   Result Value Ref Range    Troponin I ES 0.034 0.000 - 0.045 ug/L   Basic metabolic panel   Result Value Ref Range    Sodium 137 133 - 144 mmol/L    Potassium 4.5 3.4 - 5.3 mmol/L    Chloride 106 94 - 109 mmol/L    Carbon Dioxide 26 20 - 32 mmol/L    Anion Gap 5 3 - 14 mmol/L    Glucose 181 (H) 70 - 99 mg/dL    Urea Nitrogen 22 7 - 30 mg/dL    Creatinine 0.87 0.66 - 1.25 mg/dL    GFR Estimate 75 >60 mL/min/[1.73_m2]    GFR Estimate If Black 87 >60 mL/min/[1.73_m2]    Calcium 9.1 8.5 - 10.1 mg/dL   Hepatic panel   Result Value Ref Range    Bilirubin Direct 0.1 0.0 - 0.2 mg/dL    Bilirubin Total 0.6 0.2 - 1.3 mg/dL    Albumin 3.5 3.4 - 5.0 g/dL    Protein Total 7.0 6.8 - 8.8 g/dL    Alkaline Phosphatase 73 40 - 150 U/L    ALT 19 0 - 70 U/L    AST 26 0 - 45 U/L   Lipid panel reflex to direct LDL   Result Value Ref Range    Cholesterol 146 <200 mg/dL    Triglycerides 123 <150 mg/dL    HDL Cholesterol 52 >39 mg/dL    LDL Cholesterol Calculated 69 <100 mg/dL    Non HDL Cholesterol 94 <130 mg/dL   CBC with platelets   Result Value Ref Range    WBC 9.9 4.0 - 11.0 10e9/L    RBC Count 4.36 (L) 4.4 - 5.9 10e12/L    Hemoglobin 12.1 (L) 13.3 - 17.7 g/dL    Hematocrit 38.3 (L) 40.0 -  53.0 %    MCV 88 78 - 100 fl    MCH 27.8 26.5 - 33.0 pg    MCHC 31.6 31.5 - 36.5 g/dL    RDW 14.3 10.0 - 15.0 %    Platelet Count 210 150 - 450 10e9/L   Troponin I   Result Value Ref Range    Troponin I ES 0.055 (H) 0.000 - 0.045 ug/L   Glucose by meter   Result Value Ref Range    Glucose 106 (H) 70 - 99 mg/dL   Glucose by meter   Result Value Ref Range    Glucose 118 (H) 70 - 99 mg/dL   Echocardiogram Complete - Bubble study    Narrative    [id number]  [id number]  [id number]  [id number]^ANNABEL^JACK^ANASTACIO           [facility]  Echocardiography Laboratory  [facility] [place] [address]        Name: SID AGUIAR  MRN: [id number]  : 1928  Study Date: 2020 11:44 AM  Age: 89 yrs  Gender: Male  Patient Location: [facility]  Reason For Study: TIA  Ordering Physician: JACK JIN  Referring Physician: Matt Morrissey  Performed By: Brian Watson     BSA: 1.9 m2  Height: 72 in  Weight: 149 lb  HR: 68  BP: 149/114 mmHg  _____________________________________________________________________________  __        Procedure  Complete Portable Echo Adult.  _____________________________________________________________________________  __        Interpretation Summary     The visual ejection fraction is estimated at 30-35%.  There are regional wall motion abnormalities as specified.  There is mild to moderate (1-2+) mitral regurgitation.  Moderate valvular aortic stenosis. Low gradient moderate aortic stenosis is  present.  The study was technically difficult.  _____________________________________________________________________________  __        Left Ventricle  The left ventricle is normal in size. There is normal left ventricular wall  thickness. The visual ejection fraction is estimated at 30-35%. Grade I or  early diastolic dysfunction. Diastolic function not assessed due to  significant mitral annular calcification. Mid to basal inferolateral akinesis.  Basal inferior akinesis. Moderate global  hypokinesis with discrete regional  wall motion abnormalities is present. There is moderate global hypokinesia of  the left ventricle. There are regional wall motion abnormalities as specified.     Right Ventricle  The right ventricle is normal in size and function.     Atria  The left atrium is moderately dilated. Right atrial size is normal. Lipomatous  hypertrophy of the interatrial septum is noted.     Mitral Valve  There is moderate to severe mitral annular calcification. The mitral valve  leaflets are moderately thickened. The mitral papillary muscle appears  thickened and/or calcified. There is mild to moderate (1-2+) mitral  regurgitation. The mean mitral valve gradient is 3.4 mmHg.        Tricuspid Valve  There is mild (1+) tricuspid regurgitation. The right ventricular systolic  pressure is approximated at 15.1 mmHg plus the right atrial pressure. Right  ventricular systolic pressure could be underestimated due to incomplete  tricuspid regurgitation velocity envelope. IVC diameter <2.1 cm collapsing  >50% with sniff suggests a normal RA pressure of 3 mmHg.     Aortic Valve  The aortic valve is not well visualized. Thickened aortic valve leaflets. No  aortic regurgitation is present. The peak AoV pressure gradient is 17.0 mmHg.  The mean AoV pressure gradient is 7.6 mmHg. The calculated aortic valve are is  1.2 cm^2. Moderate valvular aortic stenosis.     Pulmonic Valve  There is mild (1+) pulmonic valvular regurgitation. Normal pulmonic valve  velocity.     Vessels  The aortic root is normal size. Normal size ascending aorta.     Pericardium  There is no pericardial effusion.        Rhythm  Sinus rhythm was noted. The patient exhibited occasional PACs.  _____________________________________________________________________________  __  MMode/2D Measurements & Calculations  IVSd: 1.1 cm     LVIDd: 5.4 cm  LVIDs: 5.0 cm  LVPWd: 1.1 cm  FS: 6.9 %  LV mass(C)d: 241.8 grams  LV mass(C)dI: 128.6 grams/m2  Ao root  diam: 3.2 cm  asc Aorta Diam: 3.2 cm  LVOT diam: 2.3 cm  LVOT area: 4.2 cm2  LA Volume (BP): 77.1 ml  LA Volume Index (BP): 41.0 ml/m2  RWT: 0.43           Doppler Measurements & Calculations  MV E max donnie: 91.2 cm/sec  MV A max donnie: 144.5 cm/sec  MV E/A: 0.63  MV max P.7 mmHg  MV mean PG: 3.4 mmHg  MV V2 VTI: 39.7 cm  MVA(VTI): 1.4 cm2  MV P1/2t max donnie: 99.0 cm/sec  MV P1/2t: 108.8 msec  MVA(P1/2t): 2.0 cm2  MV dec slope: 266.6 cm/sec2  MV dec time: 0.30 sec  Ao V2 max: 203.3 cm/sec  Ao max P.0 mmHg  Ao V2 mean: 127.1 cm/sec  Ao mean P.6 mmHg  Ao V2 VTI: 46.9 cm  YULIYA(I,D): 1.2 cm2  YULIYA(V,D): 1.3 cm2  LV V1 max P.5 mmHg  LV V1 max: 61.6 cm/sec  LV V1 VTI: 13.3 cm  SV(LVOT): 55.2 ml  SI(LVOT): 29.4 ml/m2  PA acc time: 0.06 sec  TR max donnie: 193.4 cm/sec  TR max PG: 15.1 mmHg  Pulm Sys Donnie: 40.7 cm/sec  Pulm Delgado Donnie: 29.1 cm/sec  Pulm S/D: 1.4  AV Donnie Ratio (DI): 0.30  YULIYA Index (cm2/m2): 0.63  E/E' av.6  Lateral E/e': 31.0  Medial E/e': 26.2              _____________________________________________________________________________  __        Report approved by: Dulce Maria Ramírez 2020 01:17 PM              Imaging and procedures:  I personally reviewed these images.  MRI brain yesterday: multiple old strokes, atrophy, no DWI abnormalities.

## 2020-03-06 NOTE — PROGRESS NOTES
[facility]    Hospitalist Progress Note    Date of Admission:  3/5/2020    Assessment & Plan     Dustin Pearce is 89 with a complicated cardiac and neurological history with chronic carotid stenosis with 100% occlusion and a prior infarction admitted with multiple episodes of recurrent facial droop, slurred speech, and weakness with a negative stroke workup [CT and MRI], thought to be maybe possible breakthrough seizures, being admitted under neuro observation status.      PLAN:   1.  Recurrent slurred speech, left facial droop, suspected possible subclinical seizures.  The patient is admitted to the neuro floor for continued neurological monitoring.  Patient had similar presentation in August, 2019 and had extensive work-up.  Seizure was suspected back then.  He had continuous EEG monitoring that was negative for seizure activity back then.    PTA anticonvulsant regimen is 500 mg of Keppra twice a day.  The patient did receive 1 gram of Keppra IV loading dose in the ED.  Continued on increased dose of 750 twice a day.   EEG done on 3/6 did not show any epileptiform discharges, showed findings consistent with moderate diffuse nonspecific encephalopathy.  Had recurrent slurring of speech with drooling on left side noted and was thereby switched to n.p.o. per speech pathologist.   Neurologist saw the patient on 3/6.  Recommended additional IV Keppra thousand milligrams and repeat MRI.  Orders in.  Changed patient to inpatient status given continued recurrent episodes of slurred speech and him needing further evaluation by neurologist and speech pathologist.   2.  Atherosclerotic cardiovascular disease.    Continue on aspirin, statin, metoprolol, Brilinta .  Echocardiogram done on 3/6 showed EF at 30 to 35%, early diastolic dysfunction, regional wall motion abnormalities, moderate aortic stenosis.  3.  History of benign prostatic hypertrophy.  Continue on tamsulosin.   4.  Gastroesophageal reflux  disease.    Continue on Prilosec.   5.  Diabetes.    Continue decreased dose of Lantus and cover with sliding scale insulin.    6.  Depression and anxiety.   Continue on Cymbalta.   7.  Deep venous thrombosis prophylaxis.    PCD's            Isaura Jenkins MD  Text Page (7am - 6pm, M-F)    Interval History   Patient has remained stable since admission.  Complained of a headache that he developed after EEG was done this morning.  He did not really remember much of yesterday.  Knew he was in the hospital but could not tell me which one.  Denied pain anywhere else.  Able to move all extremities.    -Data reviewed today: I reviewed all new labs and imaging results over the last 24 hours. I personally reviewed CT scan with result as noted above and MRI scan with result as noted above    Physical Exam   Temp: 98  F (36.7  C) Temp src: Oral BP: (!) 145/60 Pulse: 84 Heart Rate: 74 Resp: 16 SpO2: 94 % O2 Device: None (Room air)    There were no vitals filed for this visit.  Vital Signs with Ranges  Temp:  [97.6  F (36.4  C)-98.5  F (36.9  C)] 98  F (36.7  C)  Pulse:  [76-89] 84  Heart Rate:  [64-87] 74  Resp:  [15-18] 16  BP: (100-190)/() 145/60  SpO2:  [92 %-97 %] 94 %  I/O last 3 completed shifts:  In: -   Out: 100 [Urine:100]    Constitutional: Alert, appears comfortable, sitting up in chair, in no acute distress  Respiratory: Non labored breathing, clear to auscultation bilaterally, no crackles or wheezes  Cardiovascular: Heart sounds regular rate and rhythm, no murmurs, no leg edema  GI: Abdomen is soft, non distended, non tender. Normal BS  Skin/Integumen:  no pressure sores  Neuro: alert, converses appropriately, oriented x2, moving all extremities, fluent speech, no facial asymmetry  Psych: Calm and pleasant    Medications     - MEDICATION INSTRUCTIONS -         aspirin  81 mg Oral Daily     atorvastatin  40 mg Oral Daily     DULoxetine  30 mg Oral Daily     insulin aspart  1-7 Units Subcutaneous TID AC      insulin aspart  1-5 Units Subcutaneous At Bedtime     insulin glargine  26 Units Subcutaneous At Bedtime     ipratropium  1 spray Both Nostrils BID     levETIRAcetam  750 mg Oral BID     magnesium oxide  400 mg Oral BID     metoprolol succinate ER  12.5 mg Oral At Bedtime     omeprazole  20 mg Oral QAM     sennosides  2 tablet Oral Daily     tamsulosin  0.4 mg Oral Daily     ticagrelor  90 mg Oral BID       Data   Recent Labs   Lab 03/06/20  0714 03/06/20  0051 03/06/20  0014 03/05/20  1610 03/05/20  1411   WBC  --  9.9  --   --  10.4   HGB  --  12.1*  --   --  12.4*   MCV  --  88  --   --  90   PLT  --  210  --   --  195   INR  --   --   --   --  1.07   NA  --   --  137  --  139   POTASSIUM  --   --  4.5  --  4.4   CHLORIDE  --   --  106  --  107   CO2  --   --  26  --  25   BUN  --   --  22  --  20   CR  --   --  0.87  --  1.08   ANIONGAP  --   --  5  --  7   ALLISON  --   --  9.1  --  9.6   GLC  --   --  181*  --  228*   ALBUMIN  --   --  3.5  --   --    PROTTOTAL  --   --  7.0  --   --    BILITOTAL  --   --  0.6  --   --    ALKPHOS  --   --  73  --   --    ALT  --   --  19  --   --    AST  --   --  26  --   --    TROPI 0.055*  --  0.034 0.040 0.033       Imaging  Recent Results (from the past 24 hour(s))   CT Head w/o Contrast    Narrative    CT SCAN OF THE HEAD WITHOUT CONTRAST   3/5/2020 2:31 PM     HISTORY: Code stroke.    TECHNIQUE:  Axial images of the head and coronal reformations without  IV contrast material. Radiation dose for this scan was reduced using  automated exposure control, adjustment of the mA and/or kV according  to patient size, or iterative reconstruction technique.    COMPARISON: Head CT 8/24/2019, 12/31/2019    FINDINGS:  Moderate parenchymal volume loss is present. Patchy and  confluent white matter hypoattenuation likely represents advanced  chronic small ischemic change. Old left middle cerebral artery  distribution frontal operculum infarct is present. No evidence of  acute ischemia,  hemorrhage, mass, mass effect, or hydrocephalus. The  visualized calvarium, tympanic cavities, mastoid cavities, and  paranasal sinuses are unremarkable.      Impression    IMPRESSION:   1. No evidence of acute ischemia or hemorrhage.  2. 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    Findings were discussed by phone between Dr. Estrada and Dr. Aguilar  at 2:35 PM on 3/5/2020.    LEATHA ESTRADA MD   CTA Head Neck with Contrast    Narrative    CT ANGIOGRAM OF THE HEAD AND NECK WITH CONTRAST  3/5/2020 2:41 PM     HISTORY: Code Stroke.     TECHNIQUE:  CT angiography with an injection of 70 mL [id number]-370 IV  with scans through the head and neck. Images were transferred to a  separate 3-D workstation where multiplanar reformations and 3-D images  were created. Estimates of carotid stenoses are made relative to the  distal internal carotid artery diameters except as noted. Radiation  dose for this scan was reduced using automated exposure control,  adjustment of the mA and/or kV according to patient size, or iterative  reconstruction technique.    COMPARISON: CTA of the head and neck 8/24/2019     CT ANGIOGRAM HEAD FINDINGS:    Left internal carotid artery is occluded with reconstitution at the  supraclinoid segment across communicating arteries. Complete [place] of  [place] is present. The right internal coronary, anterior cerebral  arteries, and right middle cerebral artery is patent. Left middle  cerebral artery is patent but asymmetrically smaller compared to the  right. The bilateral vertebral arteries, basilar artery, and posterior  cerebral arteries are patent. Mild stenosis is present along the left  posterior cerebral artery P2 segment, unchanged. No aneurysms are  identified.     CT ANGIOGRAM NECK FINDINGS:   A three-vessel aortic arch is present with heavy atherosclerotic  plaquing. Mild stenosis is present at the left common carotid artery  origin. The bilateral common carotid arteries are patent. Prominent  right-sided internal  carotid artery tonsillar loop is present with  associated mild stenosis. Moderate-to-severe plaque is present  throughout the length of the left common carotid artery. The left  internal carotid artery is occluded at the origin. The right internal  carotid artery and bilateral external carotid arteries are patent. The  bilateral vertebral arteries are patent. Right vertebral artery is  dominant.     Moderate-to-severe cervical spine degenerative change is present.  Centrilobular emphysema noted.       Impression    IMPRESSION:     CTA HEAD:  1. 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  2. Mild stenosis of the left posterior cerebral artery P2 segment,  unchanged.    CTA NECK:  1. 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    LEATHA LUNDY MD   MR Brain w/o Contrast    Narrative    MRI BRAIN WITHOUT CONTRAST  3/5/2020 3:17 PM    HISTORY: Hyperacute MRI. Known left ICA occlusion/stroke, presenting  with new onset aphasia right hemiparesis. Has seizures that can  present this way.    TECHNIQUE: Multiplanar, multisequence MRI of the brain without  gadolinium IV contrast material.      COMPARISON: Head CT 3/5/2020, head MRI 7/17/2019    FINDINGS:   Moderate volume loss is present. Extensive patchy and confluent white  matter T2 hyperintensity is present likely reflecting chronic small  vessel ischemic change. No evidence of acute ischemia, hemorrhage,  mass, mass effect, or hydrocephalus. Old left frontal infarct is  present. Multiple small old cerebellar infarcts are present. Old left  thalamic lacunar infarct is present. Left internal carotid artery flow  void is abnormal consistent with known occlusion. The visualized  calvarium, tympanic cavities, and mastoid cavities are unremarkable.      Impression    IMPRESSION:  1. No evidence of acute ischemia or hemorrhage.  2. Chronic small vessel ischemic change.  3. Multiple old infarcts.    LEATHA LUNDY MD   Chest XR,   PA & LAT    Narrative    XR CHEST 2 VW  3/5/2020 4:20 PM       INDICATION: Chest pressure  COMPARISON: 2018       Impression    IMPRESSION: Minimal scarring or atelectasis in the lingula, similar to  previous. The lungs are otherwise clear. No acute findings. Heart size  normal.    LOUISE POSADAS MD   Echocardiogram Complete - Bubble study    Narrative    [id number]  [id number]  [id number]  [id number]^ANNABEL^JACK^ANASTACIO           [facility]  Echocardiography Laboratory  [address] [place] [id number]        Name: SID AGUIAR  MRN: [id number]  : 1928  Study Date: 2020 11:44 AM  Age: 89 yrs  Gender: Male  Patient Location: [facility]  Reason For Study: TIA  Ordering Physician: JACK JIN  Referring Physician: Matt Morrissey  Performed By: Brian Watson     BSA: 1.9 m2  Height: 72 in  Weight: 149 lb  HR: 68  BP: 149/114 mmHg  _____________________________________________________________________________  __        Procedure  Complete Portable Echo Adult.  _____________________________________________________________________________  __        Interpretation Summary     The visual ejection fraction is estimated at 30-35%.  There are regional wall motion abnormalities as specified.  There is mild to moderate (1-2+) mitral regurgitation.  Moderate valvular aortic stenosis. Low gradient moderate aortic stenosis is  present.  The study was technically difficult.  _____________________________________________________________________________  __        Left Ventricle  The left ventricle is normal in size. There is normal left ventricular wall  thickness. The visual ejection fraction is estimated at 30-35%. Grade I or  early diastolic dysfunction. Diastolic function not assessed due to  significant mitral annular calcification. Mid to basal inferolateral akinesis.  Basal inferior akinesis. Moderate global hypokinesis with discrete regional  wall motion abnormalities is present. There is  moderate global hypokinesia of  the left ventricle. There are regional wall motion abnormalities as specified.     Right Ventricle  The right ventricle is normal in size and function.     Atria  The left atrium is moderately dilated. Right atrial size is normal. Lipomatous  hypertrophy of the interatrial septum is noted.     Mitral Valve  There is moderate to severe mitral annular calcification. The mitral valve  leaflets are moderately thickened. The mitral papillary muscle appears  thickened and/or calcified. There is mild to moderate (1-2+) mitral  regurgitation. The mean mitral valve gradient is 3.4 mmHg.        Tricuspid Valve  There is mild (1+) tricuspid regurgitation. The right ventricular systolic  pressure is approximated at 15.1 mmHg plus the right atrial pressure. Right  ventricular systolic pressure could be underestimated due to incomplete  tricuspid regurgitation velocity envelope. IVC diameter <2.1 cm collapsing  >50% with sniff suggests a normal RA pressure of 3 mmHg.     Aortic Valve  The aortic valve is not well visualized. Thickened aortic valve leaflets. No  aortic regurgitation is present. The peak AoV pressure gradient is 17.0 mmHg.  The mean AoV pressure gradient is 7.6 mmHg. The calculated aortic valve are is  1.2 cm^2. Moderate valvular aortic stenosis.     Pulmonic Valve  There is mild (1+) pulmonic valvular regurgitation. Normal pulmonic valve  velocity.     Vessels  The aortic root is normal size. Normal size ascending aorta.     Pericardium  There is no pericardial effusion.        Rhythm  Sinus rhythm was noted. The patient exhibited occasional PACs.  _____________________________________________________________________________  __  MMode/2D Measurements & Calculations  IVSd: 1.1 cm     LVIDd: 5.4 cm  LVIDs: 5.0 cm  LVPWd: 1.1 cm  FS: 6.9 %  LV mass(C)d: 241.8 grams  LV mass(C)dI: 128.6 grams/m2  Ao root diam: 3.2 cm  asc Aorta Diam: 3.2 cm  LVOT diam: 2.3 cm  LVOT area: 4.2 cm2  LA  Volume (BP): 77.1 ml  LA Volume Index (BP): 41.0 ml/m2  RWT: 0.43           Doppler Measurements & Calculations  MV E max donnie: 91.2 cm/sec  MV A max donnie: 144.5 cm/sec  MV E/A: 0.63  MV max P.7 mmHg  MV mean PG: 3.4 mmHg  MV V2 VTI: 39.7 cm  MVA(VTI): 1.4 cm2  MV P1/2t max donnie: 99.0 cm/sec  MV P1/2t: 108.8 msec  MVA(P1/2t): 2.0 cm2  MV dec slope: 266.6 cm/sec2  MV dec time: 0.30 sec  Ao V2 max: 203.3 cm/sec  Ao max P.0 mmHg  Ao V2 mean: 127.1 cm/sec  Ao mean P.6 mmHg  Ao V2 VTI: 46.9 cm  YULIYA(I,D): 1.2 cm2  YULIYA(V,D): 1.3 cm2  LV V1 max P.5 mmHg  LV V1 max: 61.6 cm/sec  LV V1 VTI: 13.3 cm  SV(LVOT): 55.2 ml  SI(LVOT): 29.4 ml/m2  PA acc time: 0.06 sec  TR max donnie: 193.4 cm/sec  TR max PG: 15.1 mmHg  Pulm Sys Donnie: 40.7 cm/sec  Pulm Delgado Donnie: 29.1 cm/sec  Pulm S/D: 1.4  AV Donnie Ratio (DI): 0.30  YULIYA Index (cm2/m2): 0.63  E/E' av.6  Lateral E/e': 31.0  Medial E/e': 26.2              _____________________________________________________________________________  __        Report approved by: Dulce Maria Ramírez 2020 01:17 PM

## 2020-03-06 NOTE — PLAN OF CARE
Discharge Planner SLP   Patient plan for discharge: Did not discuss  Current status: Pt seen for clinical swallow evaluation. Pt consumed thin liquids via cup sip without overt s/sx of aspiration. Subtle throat clear with straw sips. Pt tolerated small amount of puree and 1/2 cracker with slow but functional oral manipulation. Cues needed for a liquid wash to assist in fully clearing oral cavity. Limited trials of solids per pt request as he wanted to be done and get back into bed.     Recommend regular solids and thin liquids - no straws. Pt must be seated upright for all oral intake, adequate alertness, small bites/sips, slow rate of intake, and alternate solids and liquids. Nursing to monitor tolerance and s/sx of aspiration. SLP will follow up for diet tolerance.     Barriers to return to prior living situation: Below baseline  Recommendations for discharge: Home  Rationale for recommendations: SLP follow up at next level of care for management of dysphagia and diet tolerance should pt not meet his dysphagia goals during acute care admission          Entered by: Willa Santillan 03/06/2020 2:45 PM

## 2020-03-06 NOTE — PROGRESS NOTES
03/06/20 1435   General Information   Onset Date 03/06/20   Start of Care Date 03/06/20   Referring Physician Blaze Martinez MD   Patient Profile Review/OT: Additional Occupational Profile Info See Profile for full history and prior level of function   Patient/Family Goals Statement None stated    Swallowing Evaluation Bedside swallow evaluation   Behaviorial Observations WFL (within functional limits)  (fatigued )   Mode of current nutrition NPO   Respiratory Status Room air   Comments Dustin Pearce is 89 with a complicated cardiac and neurological history with chronic carotid stenosis with 100% occlusion and a prior infarction admitted with multiple episodes of recurrent facial droop, slurred speech, and weakness with a negative stroke workup CT and MRI, thought to be maybe possible breakthrough seizures, being admitted under neuro observation status. Pt has been seen by SLP department during multiple prior admissions, many for similar events resulting in this admission. Swallow evaluation attempted earlier this AM, however it was terminated early d/t neuro changes occuring during the evaluation (increased droop, dysarthria and drooling). He is now alert and seated in a chair for swallow evaluation this afternoon, but appears very fatigued   Clinical Swallow Evaluation   Oral Musculature anomalies present   Structural Abnormalities none present   Dentition present and adequate   Secretion Management left corner drooling  (this AM, less this PM)   Mucosal Quality adequate   Oral Labial Strength and Mobility impaired retraction;impaired pursing   Lingual Strength and Mobility impaired protrusion;impaired left lateral movement;impaired right lateral movement;impaired coordination   Laryngeal Function Cough;Swallow;Voicing initiated   Additional Documentation Yes   Swallow Eval   Feeding Assistance minimal assistance required   Clinical Swallow Eval: Thin Liquid Texture Trial   Mode of Presentation, Thin  Liquids cup;straw;self-fed   Volume of Liquid or Food Presented 4 oz    Oral Phase of Swallow Premature pharyngeal entry   Pharyngeal Phase of Swallow intact   Diagnostic Statement Slight throat clear with straw sip. Apparent improved timing and coordination with cup sips. Anterior labial loss from right side x 1 d/t self feeding    Clinical Swallow Eval: Puree Solid Texture Trial   Mode of Presentation, Puree spoon;self-fed   Volume of Puree Presented 1 oz    Oral Phase, Puree WFL   Pharyngeal Phase, Puree intact   Diagnostic Statement Adequate oral manipulation, good oral clearance. No overt s/sx of aspiration    Clinical Swallow Eval: Solid Food Texture Trial   Mode of Presentation, Solid self-fed   Volume of Solid Food Presented 1/2 cracker   Oral Phase, Solid WFL   Pharyngeal Phase, Solid intact   Diagnostic Statement Slow but functional mastication and oral manipulation, slight prolonged oral clearance time requiring cues for a liquid wash. No s/sx of aspiration. Pt denies any difficulty, limited trials per pt wanting to get back to bed    Swallow Compensations   Swallow Compensations Alternate viscosity of consistencies;Effortful swallow;Pacing;Multiple swallow   Results No difficulties noted   Esophageal Phase of Swallow   Patient reports or presents with symptoms of esophageal dysphagia No   General Therapy Interventions   Planned Therapy Interventions Dysphagia Treatment   Dysphagia treatment Instruction of safe swallow strategies   Swallow Eval: Clinical Impressions   Skilled Criteria for Therapy Intervention Skilled criteria met.  Treatment indicated.   Functional Assessment Scale (FAS) 5   Treatment Diagnosis Mild oropharyngeal dysphagia    Diet texture recommendations Regular diet;Thin liquids   Recommended Feeding/Eating Techniques alternate between small bites and sips of food/liquid;check mouth frequently for oral residue/pocketing;hard swallow w/ each bite or sip;maintain upright posture  during/after eating for 30 mins;no straws;small sips/bites   Therapy Frequency 5x/week   Predicted Duration of Therapy Intervention (days/wks) 1 week   Anticipated Discharge Disposition home w/ assist   Risks and Benefits of Treatment have been explained. Yes   Patient, family and/or staff in agreement with Plan of Care Yes   Clinical Impression Comments Pt seen for clinical swallow evaluation. He is alert, seated in a chair but appears fatigued. Pt consumed thin liquids via cup sip without overt s/sx of aspiration. Subtle throat clear with straw sips. Pt tolerated small amount of puree and 1/2 cracker with slow but functional oral manipulation. Cues needed for a liquid wash to assist in fully clearing oral cavity. Limited trials of solids per pt request as he wanted to be done and get back into bed. Recommend regular solids and thin liquids - no straws. Pt must be seated upright for all oral intake, adequate alertness, small bites/sips, slow rate of intake, and alternate solids and liquids. Nursing to monitor tolerance and s/sx of aspiration. SLP will follow up for diet tolerance.    Total Evaluation Time   Total Evaluation Time (Minutes) 13

## 2020-03-06 NOTE — PROGRESS NOTES
RECEIVING UNIT ED HANDOFF REVIEW    ED Nurse Handoff Report was reviewed by: Savanna Langford RN on March 5, 2020 at 10:42 PM

## 2020-03-06 NOTE — PROCEDURES
Procedure Date: 2020      STAT PORTABLE ELECTROENCEPHALOGRAM WITH VIDEO       ORDERING PROVIDER:  Markos Chaney MD        EEG # F[facility]        This is a stat EEG performed in the standard International 10-20 electrode system.  The awake recording shows asymmetry, with mainly theta frequency in the left hemisphere with some intermixed delta and mainly delta frequency in the right hemisphere.  No epileptiform discharges or seizures are seen during this recording.  The patient did fall asleep with POSTS and vertex waves.  The asymmetry remained during sleep, with more theta activity in the left hemisphere and more delta activity in the right hemisphere.  This asymmetry was persistent throughout the recording.      IMPRESSION:  This is an abnormal stat EEG due to:   1.  Generalized slowing in the left hemisphere, mainly in the theta frequency.   2.  Asymmetry, with increased slowing in the right hemisphere, mainly in the delta frequency.      No epileptiform discharges or seizures occurred during the recording.      This EEG is consistent with diffuse encephalopathy.  Additionally, the asymmetry shows abnormal electrographic function in the right hemisphere.  Clinical correlation is recommended.         MARKOS CHANEY MD             D: 2020   T: 2020   MT: NORMA      Name:     SID AGUIAR   MRN:      [id number]        Account:        [id number]   :      1928           Procedure Date: 2020      Document: [id number]

## 2020-03-07 NOTE — PLAN OF CARE
Discharge Planner SLP   Patient plan for discharge: Not addressed.   Current status: SLP: Patient was seen for swallow treatment with family present. Nurse requested to assess patient if safe to swallow anything. Patient with new neuro changes and going to have an MRI shortly. He was lethargic at time of attempt, unable to follow directions to assess oral motor function. He was given 2 swallows of nectar thick water with mild oral spillage on the left side, delayed swallow response and vocal wetness after the swallow. Patient is currently not safe for any further PO trials.   Recommend: 1. Remain NPO overnight and will re-assess in the am for PO readiness as tolerated.  Barriers to return to prior living situation: Acuity of his illness and dysphagia.  Recommendations for discharge: TCU  Rationale for recommendations: ST needs at next level of care for dysphagia management. Patient is below his baseline.        Entered by: Chloe Mclain 03/07/2020 3:18 PM

## 2020-03-07 NOTE — PROVIDER NOTIFICATION
MD Notification    Notified Person: MD    Notified Person Name: Dr. Chaney    Notification Date/Time: 3/7/20 1225    Notification Interaction: paged/called back    Purpose of Notification: Another episode of L facial droop &garbled SLP. Also exhibited LUE ataxia, slight LUE/LE paresis, L pronator drift.     Orders Received: TORB for one time order now Cerebyx 15mg PE/kg IV STAT.    Comments:

## 2020-03-07 NOTE — PLAN OF CARE
PT cancel - Upon arrival, patient sleeping and significant other indicating patient just had a seizure over the lunch hr. Will hold PT today.

## 2020-03-07 NOTE — PROVIDER NOTIFICATION
Writer paged Dr. Jenkins, notified, Patient itchy, patient NPO, had another episode of slurred speech, facial droop, could you please address meds and order IV med for itchiness. Please advise.

## 2020-03-07 NOTE — PROVIDER NOTIFICATION
Have ordered low-dose IV Benadryl once for generalized itching.  Note that patient is now n.p.o.  Okay to hold all his medications today while n.p.o.  Have changed Keppra to 750 mg IV twice daily.  Defer to neurology to dose/order antiepileptics.

## 2020-03-07 NOTE — CONSULTS
"Care Transition Initial Assessment - SW     Met with: PATIENT,FAMILY  Active Problems:    Seizure (H)    Altered mental status       DATA  Lives With: significant other   Living Arrangements: house(split level town house)     Description of Support System: Supportive, Involved  Who is your support system?: Significant Other, Children  Support Assessment: Adequate family and caregiver support, Adequate social supports.   Identified issues/concerns regarding health management: Need for increases services at time of discharge.   Transportation Anticipated: family or friend will provide    ASSESSMENT  Cognitive Status: Unable to assess; Met with Significant other.  Concerns to be addressed: Discharge planning.    SW reviewed chart and met with patient/significant other to discuss discharge planning. Pt was admitted 3/5/20 with Seizure/altered mental status. Anticipated discharge date: 3/8/20.  SW introduced self and role. S.O. reports they live together in their [place] home. Pt had gotten back to baseline from previous hospitalizations/TCU stays and was doing all ADL's. We reviewed therapy recommendation for: TCU. S.Other requested [facility] as first choice, accepting costs; [facility] 2nd choice (has been there 2x before). HUBER explained son, Leonidas, would be considered pt's decision maker. Leonidas was not present for this discussion but significant other stated son had just left and stated \"do whatever is best for the two of you\".  SW sent referrals thru [facility]. We did not discuss transportation.      PLAN  Financial costs for the patient includes: Possible transport costs  Patient given options and choices for discharge.  Patient/family is agreeable to the plan?  Yes  Transportation/person available to transport on day of discharge  is TBD  Patient Goals and Preferences: Discharge to TCU .  Patient anticipates discharging to:  TCU .    Continue to assist as needed.    ROYA Calles   [facility] " [facility]  [phone]

## 2020-03-07 NOTE — PROGRESS NOTES
[facility]    Hospitalist Progress Note    Date of Admission:  3/5/2020    Assessment & Plan     Dustin Pearce is 89 with a complicated cardiac and neurological history with chronic carotid stenosis with 100% occlusion and a prior infarction admitted with multiple episodes of recurrent facial droop, slurred speech, and weakness with a negative stroke workup [CT and MRI], thought to be maybe possible breakthrough seizures.     PLAN:   1.  Recurrent slurred speech, left facial droop, suspected possible seizures.  The patient is admitted to the neuro floor for continued neurological monitoring.  Patient had similar presentation in August, 2019 and had extensive work-up.  Seizure was suspected back then.  He had continuous EEG monitoring that was negative for seizure activity back then.    PTA anticonvulsant regimen is 500 mg of Keppra twice a day.  The patient did receive 1 gram of Keppra IV loading dose in the ED.  Continued on increased dose of 750 twice a day.   EEG done on 3/6 did not show any epileptiform discharges, showed findings consistent with moderate diffuse nonspecific encephalopathy.  Had recurrent slurring of speech with drooling on left side noted and was thereby switched to n.p.o. per speech pathologist.   Neurologist saw the patient on 3/6.  They felt that his presentation was consistent with breakthrough seizures and patient was given additional 1000 mg IV Keppra bolus.  Continued on 750 mg twice daily of Keppra.  When he was more alert and in between episodes he was able to swallow without difficulty and was allowed regular diet.  Changed patient to inpatient status given continued recurrent episodes of slurred speech and him needing further evaluation by neurologist and speech pathologist.   On 3/7: He had a another similar episode.  He is given a dose of IV fosphenytoin.  Neurologist to see for further management.  2.  Atherosclerotic cardiovascular disease.    Continue on aspirin,  statin, metoprolol, Brilinta .  Echocardiogram done on 3/6 showed EF at 30 to 35%, early diastolic dysfunction, regional wall motion abnormalities, moderate aortic stenosis.  3.  History of benign prostatic hypertrophy.  Continue on tamsulosin.   4.  Gastroesophageal reflux disease.    Continue on Prilosec.   5.  Diabetes.    Continue decreased dose of Lantus and cover with sliding scale insulin.    6.  Depression and anxiety.   Continue on Cymbalta.   7.  Deep venous thrombosis prophylaxis.    PCD's      Disposition: Patient will remain inpatient.  Anticipate needing 1-2 more days in hospital till seizure episodes are controlled.      Isaura Jenkins MD  Text Page (7am - 6pm, M-F)    Interval History   Patient was doing this well this morning when seen.  He was sitting up in chair and combing his hair.  Denied any pain.  However later in the morning he had another episode of slurred speech and facial droop and neurology was contacted .  IV fosphenytoin has been started.    -Data reviewed today: I reviewed all new labs and imaging results over the last 24 hours. I personally reviewed CT scan with result as noted above and MRI scan with result as noted above    Physical Exam   Temp: 98.1  F (36.7  C) Temp src: Oral BP: 122/59   Heart Rate: 56 Resp: 20 SpO2: 94 % O2 Device: None (Room air)    Vitals:    03/07/20 0500   Weight: 71.4 kg (157 lb 6.5 oz)     Vital Signs with Ranges  Temp:  [97.3  F (36.3  C)-99.3  F (37.4  C)] 98.1  F (36.7  C)  Heart Rate:  [53-91] 56  Resp:  [18-20] 20  BP: ()/(45-59) 122/59  SpO2:  [93 %-99 %] 94 %  I/O last 3 completed shifts:  In: 410 [P.O.:410]  Out: -     Constitutional: Alert, appears comfortable, sitting up in chair, in no acute distress  Respiratory: Non labored breathing, clear to auscultation bilaterally, no crackles or wheezes  Cardiovascular: Heart sounds regular rate and rhythm, no murmurs, no leg edema  GI: Abdomen is soft, non distended, non tender. Normal  BS  Skin/Integumen:  no pressure sores  Neuro: alert, converses appropriately, oriented x2, moving all extremities, fluent speech, no facial asymmetry  Psych: Calm and pleasant    Medications     - MEDICATION INSTRUCTIONS -         aspirin  81 mg Oral Daily     atorvastatin  40 mg Oral Daily     DULoxetine  30 mg Oral Daily     fosphenytoin (CEREBYX) IV (loading dose)  15 mg PE/kg (Adjusted) Intravenous Once     insulin aspart  1-7 Units Subcutaneous TID AC     insulin aspart  1-5 Units Subcutaneous At Bedtime     insulin glargine  15 Units Subcutaneous At Bedtime     ipratropium  1 spray Both Nostrils BID     levETIRAcetam  750 mg Oral BID     magnesium oxide  400 mg Oral BID     metoprolol succinate ER  12.5 mg Oral At Bedtime     omeprazole  20 mg Oral QAM     sennosides  2 tablet Oral Daily     tamsulosin  0.4 mg Oral Daily     ticagrelor  90 mg Oral BID       Data   Recent Labs   Lab 03/06/20  0714 03/06/20  0051 03/06/20  0014 03/05/20  1610 03/05/20  1411   WBC  --  9.9  --   --  10.4   HGB  --  12.1*  --   --  12.4*   MCV  --  88  --   --  90   PLT  --  210  --   --  195   INR  --   --   --   --  1.07   NA  --   --  137  --  139   POTASSIUM  --   --  4.5  --  4.4   CHLORIDE  --   --  106  --  107   CO2  --   --  26  --  25   BUN  --   --  22  --  20   CR  --   --  0.87  --  1.08   ANIONGAP  --   --  5  --  7   ALLISON  --   --  9.1  --  9.6   GLC  --   --  181*  --  228*   ALBUMIN  --   --  3.5  --   --    PROTTOTAL  --   --  7.0  --   --    BILITOTAL  --   --  0.6  --   --    ALKPHOS  --   --  73  --   --    ALT  --   --  19  --   --    AST  --   --  26  --   --    TROPI 0.055*  --  0.034 0.040 0.033       Imaging  No results found for this or any previous visit (from the past 24 hour(s)).

## 2020-03-07 NOTE — PLAN OF CARE
PT here with AMS, recurrent slurred speech, L facial droop, possible subclinical seizures. MRI neg. A&Ox2-3, confused to place and situation. Neuros intact, ex confusion. CMS intact. VSS, on RA. Tele: SR w/ BBB. No episodes reported or noted this shift. Up with assist of 1 walker and gait belt. Continent. Mod CHO diet, no straw. No sliding scale insulin given this shift. Denies pain. Pt scoring green on aggression stop light tool. Plan: recommending TCU vs home, date of discharge pending.

## 2020-03-07 NOTE — PROGRESS NOTES
SPIRITUAL HEALTH SERVICES Progress Note  [facility]    Visit per pt's family's request. Family requested , since Fr. Dent is out sick,  contacted  in their home parish, [facility] in [place].  said he would be there in about an hour.  made family aware of 's arrival time, and informed family of the availability of  services in case they need anything else.  remains available for any further requests.       Giovanna Bhatt   Intern

## 2020-03-07 NOTE — PROGRESS NOTES
[facility]    Neurology Progress Note    Date of Service (when I saw the patient): 03/07/2020     Today's developments: Another episode of confusion, L weakness, resolved after MRI.  MRI brain- no stroke but a lot of movement.  Add on vimpat 50 mg bid.    Assessment & Plan   Dustin Pearce is a 89 year old male who was admitted on 3/5/2020. I was asked to see the patient for repeated episodes of AMS, l facial droop, likely seizures, negative MRI brain 3-5 for acute changes.       --Continue Keppra 750 bid.   --Another similar episode today- improving but not back to baseline: stat MRI and add on vimpat.  -- Add vimpat 50 mg bid  (WV interval normal) (some meds eg dilantin and depakote and oxcarb will interfere with his Brilinta.  Sulfa allergy so will hold on zonisamide).  Will consider increase dose as needed.  --Given keppra bolus 1000 mg IV on 3-6.    I discussed the above diagnosis, assessment, testing, and results with the patient and his wife.  Over 30 minutes total today with more than 50% counseling and coordination of care.    Dee Chaney MD          Interval History   Event this pm- L facial weakness, aphasia.  I reexamined him later in the day- tired but facial droop resolved.    Physical Exam   Temp: 98.1  F (36.7  C) Temp src: Oral BP: 122/59   Heart Rate: 56 Resp: 20 SpO2: 94 % O2 Device: None (Room air)    Vitals:    03/07/20 0500   Weight: 71.4 kg (157 lb 6.5 oz)     Vital Signs with Ranges  Temp:  [97.3  F (36.3  C)-99.3  F (37.4  C)] 98.1  F (36.7  C)  Heart Rate:  [53-91] 56  Resp:  [18-20] 20  BP: ()/(45-59) 122/59  SpO2:  [93 %-99 %] 94 %  I/O last 3 completed shifts:  In: 410 [P.O.:410]  Out: -       General Appearance:  No acute distress  Neuro:       Mental Status Exam:    A,A, NAD       Cranial Nerves:   EOMI, face equal and symmetric, speech normal (after event resolved)           Motor:   5/5 BUE, unable to follow commands for lowers          Sensory:   Nl lt BUE               CV: RRR nl s1, s2  Resp: CTAB    Extremities: No clubbing, no cyanosis, no edema    Medications     - MEDICATION INSTRUCTIONS -         aspirin  81 mg Oral Daily     atorvastatin  40 mg Oral Daily     DULoxetine  30 mg Oral Daily     insulin aspart  1-7 Units Subcutaneous TID AC     insulin aspart  1-5 Units Subcutaneous At Bedtime     insulin glargine  15 Units Subcutaneous At Bedtime     ipratropium  1 spray Both Nostrils BID     levETIRAcetam  750 mg Oral BID     magnesium oxide  400 mg Oral BID     metoprolol succinate ER  12.5 mg Oral At Bedtime     omeprazole  20 mg Oral QAM     sennosides  2 tablet Oral Daily     tamsulosin  0.4 mg Oral Daily     ticagrelor  90 mg Oral BID     zonisamide  100 mg Oral At Bedtime       Data   Results for orders placed or performed during the hospital encounter of 03/05/20 (from the past 24 hour(s))   Glucose by meter   Result Value Ref Range    Glucose 159 (H) 70 - 99 mg/dL   Glucose by meter   Result Value Ref Range    Glucose 171 (H) 70 - 99 mg/dL   Glucose by meter   Result Value Ref Range    Glucose 132 (H) 70 - 99 mg/dL   Glucose by meter   Result Value Ref Range    Glucose 219 (H) 70 - 99 mg/dL         Images and Procedures:  I personally reviewed the images of the following studies:  MRI brain: movement artifact, otherwise unchanged from before

## 2020-03-07 NOTE — PROVIDER NOTIFICATION
Write notified by Princess, RN, patient was having neuro changes, Jessica, charge RN assessing patient when writer entered room. L facial droop, slurred speech, drooling, patient alert orient x3, LUE weakness and pronator drift noted as well. Dr. Chaney paged and notified, charge RN, Jessica, spoke to Dr. Chaney on phone, 1x dose of IV Cerebyx ordered STAT, writer updated and informed. Dr. Chaney talk with family when on unit.

## 2020-03-08 NOTE — PROGRESS NOTES
CM    I: Pt has been accepted at both [facility] and [facility]. Per pt/significant other, they request [facility]. Weekday SW to follow up with [facility] admissions on Monday to coordinate, if pt is medically ready. SW updated [facility] admissions.     P: Continue to assist as needed.    ROYA Calles   [facility]  [phone]

## 2020-03-08 NOTE — PLAN OF CARE
Pt here with AMS, recurrent slurred speech, left facial droop, suspected possible subclinical seizures. A&O x3, forgetful to time. VSS, on RA. LS clear. Tele SR w/ BBB. Denies pain. CMS and Neuro's back to baseline, intact, slow to respond. Up A1-2 GB and walker today. Voiding, bathroom/urinal. +BS, passing flatus, senna and Miralax given, no BM this shift. Advanced to DD1 Mod CHO thin liquid diet, assist with meals.  , 205, 182 sliding scale insulin given. Pt scoring green on the Aggression Stop Light Tool. Plans to discharge to TCU, possibly tomorrow, pending. Nrsg will continue to monitor.

## 2020-03-08 NOTE — PLAN OF CARE
Pt here with AMS, recurrent slurred speech, left facial droop, suspected possible subclinical seizures.Pt had another episode of slurred speech, L facial droop, drooling, LUE weakness, Dr. Chaney notified, 1x dose of Cerebyx given, repeat MRI completed, showing no changes, no acute findings. Pt currently lethargic, oriented x3, confused to time. VSS, on RA. LS clear. Tele SB w/ BBB. Denies pain. CMS and Neuro's returning back to baseline, able to follow most commands, no slurred speech no L facial droop, BUE equal strength, patient still slow to respond with last assessment. Up A1 GB and walker. Voiding, urinal. +BS, passing flatus, senna in morning, no BM this shift. NPO d/t lethargy, wason a Mod CHO diet. Pt scoring green on the Aggression Stop Light Tool. TCU at time of discharge, pending. Nrsg will continue to monitor.

## 2020-03-08 NOTE — PLAN OF CARE
Discharge Planner SLP   Patient plan for discharge: Not addressed.   Current status: SLP: Patient seen for swallow treatment while up in the chair. Patient demonstrated premature entry of thin liquids with a mild delay. Coughed x1 on 5 oz of water. Same results with nectar thcik liquids via the cup. Patient was able to tolerate pudding with good bolus extraction and clearing. Mastication was suffficient for a solid and good oral clearing, but delayed coughing suspect pharyngeal residue. Fatigue impacting his swallow function.   Recommend: 1. DDL 1 with thin liquids. 2. Up in a chair for all meals, feed only when fully alert, no straws, small bites/sips, alternate liquids/solids every few bites. Crush medications and place in pudding. Hold diet if increased coughing or changes in his respiratory status.   Barriers to return to prior living situation: Dysphagia and deconditioning.   Recommendations for discharge: TCU  Rationale for recommendations: Will need on going ST needs for dysphagia management for diet tolerance and advancement. Patient is well below his baseline.        Entered by: Chloe Mclain 03/08/2020 9:11 AM

## 2020-03-08 NOTE — PLAN OF CARE
PT here with AMS, recurrent slurred speech, L facial droop, possible subclinical seizures. A&Ox3, confused to time, lethargic. Neuros intact, ex confusion. CMS intact. VSS, on RA. Tele: SR w/ BBB. No episodes reported or noted this shift. Up with assist of 2 walker and gait belt. Continent. NPO. No sliding scale insulin given this shift. Denies pain. Pt scoring green on aggression stop light tool. Plan: recommending TCU, date of discharge pending.

## 2020-03-08 NOTE — PROGRESS NOTES
[facility]    Hospitalist Progress Note    Date of Admission:  3/5/2020    Assessment & Plan     Dustin Pearce is 89 with a complicated cardiac and neurological history with chronic carotid stenosis with 100% occlusion and a prior infarction admitted with multiple episodes of recurrent facial droop, slurred speech, and weakness with a negative stroke workup [CT and MRI], thought to be maybe possible breakthrough seizures.     PLAN:   1.  Recurrent slurred speech, left facial droop, suspected possible seizures.  The patient is admitted to the neuro floor for continued neurological monitoring.  Patient had similar presentation in August, 2019 and had extensive work-up.  Seizure was suspected back then.  He had continuous EEG monitoring that was negative for seizure activity back then.    PTA anticonvulsant regimen is 500 mg of Keppra twice a day.  The patient did receive 1 gram of Keppra IV loading dose in the ED.  Continued on increased dose of 750 twice a day.   EEG done on 3/6 did not show any epileptiform discharges, showed findings consistent with moderate diffuse nonspecific encephalopathy.  Had recurrent slurring of speech with drooling on left side noted and was thereby switched to n.p.o. per speech pathologist.   Neurologist saw the patient on 3/6.  They felt that his presentation was consistent with breakthrough seizures and patient was given additional 1000 mg IV Keppra bolus.  Continued on 750 mg twice daily of Keppra.  When he was more alert and in between episodes he was able to swallow without difficulty and was allowed regular diet.  Changed patient to inpatient status given continued recurrent episodes of slurred speech and him needing further evaluation by neurologist and speech pathologist.   On 3/7: He had a another similar episode.  He was given a dose of IV fosphenytoin.  He was later started on Vimpat in addition to continuing the Keppra per neurology.  He has remained stable  with no further episodes since 3/7.  Is being followed by speech pathology.  Is currently on DDL 1.  2.  Atherosclerotic cardiovascular disease.    Continue on aspirin, statin, metoprolol, Brilinta .  Echocardiogram done on 3/6 showed EF at 30 to 35%, early diastolic dysfunction, regional wall motion abnormalities, moderate aortic stenosis.  Hemodynamically stable.  No cardiac symptoms.  3.  History of benign prostatic hypertrophy.  Continue on tamsulosin.   4.  Gastroesophageal reflux disease.    Continue on Prilosec.   5.  Diabetes.    Continue decreased dose of Lantus and cover with sliding scale insulin.    6.  Depression and anxiety.   Continue on Cymbalta.   7.  Deep venous thrombosis prophylaxis.    PCD's      Disposition: Patient will remain inpatient.  If he remains stable with no recurrent spells, may be able to discharge to TCU tomorrow.      Isaura Jenkins MD  Text Page (7am - 6pm, M-F)    Interval History   Patient was doing this well this morning when seen.  He was sitting up in chair and eating breakfast.  Denied any pain.  Wife at bedside.  He denied any headache, fevers, chills.  Answered my questions appropriately.    -Data reviewed today: I reviewed all new labs and imaging results over the last 24 hours. I personally reviewed CT scan with result as noted above and MRI scan with result as noted above    Physical Exam   Temp: 97.5  F (36.4  C) Temp src: Oral BP: 108/55   Heart Rate: 64 Resp: 16 SpO2: 97 % O2 Device: None (Room air)    Vitals:    03/07/20 0500   Weight: 71.4 kg (157 lb 6.5 oz)     Vital Signs with Ranges  Temp:  [97.5  F (36.4  C)-98.1  F (36.7  C)] 97.5  F (36.4  C)  Heart Rate:  [61-71] 64  Resp:  [16-20] 16  BP: (108-140)/(44-64) 108/55  SpO2:  [91 %-98 %] 97 %  I/O last 3 completed shifts:  In: -   Out: 250 [Urine:250]    Constitutional: Alert, appears comfortable, sitting up in chair, in no acute distress  Respiratory: Non labored breathing, clear to auscultation bilaterally,  no crackles or wheezes  Cardiovascular: Heart sounds regular rate and rhythm, no murmurs, no leg edema  GI: Abdomen is soft, non distended, non tender. Normal BS  Skin/Integumen:  no pressure sores  Neuro: alert, converses appropriately, oriented x2, moving all extremities, fluent speech, no facial asymmetry  Psych: Calm and pleasant    Medications     - MEDICATION INSTRUCTIONS -         aspirin  81 mg Oral Daily     atorvastatin  40 mg Oral Daily     DULoxetine  30 mg Oral Daily     insulin aspart  1-7 Units Subcutaneous TID AC     insulin aspart  1-5 Units Subcutaneous At Bedtime     insulin glargine  10 Units Subcutaneous At Bedtime     ipratropium  1 spray Both Nostrils BID     lacosamide  50 mg Oral BID     levETIRAcetam  750 mg Oral BID     magnesium oxide  400 mg Oral BID     metoprolol succinate ER  12.5 mg Oral At Bedtime     omeprazole  20 mg Oral QAM     sennosides  2 tablet Oral Daily     tamsulosin  0.4 mg Oral Daily     ticagrelor  90 mg Oral BID       Data   Recent Labs   Lab 03/06/20  0714 03/06/20  0051 03/06/20  0014 03/05/20  1610 03/05/20  1411   WBC  --  9.9  --   --  10.4   HGB  --  12.1*  --   --  12.4*   MCV  --  88  --   --  90   PLT  --  210  --   --  195   INR  --   --   --   --  1.07   NA  --   --  137  --  139   POTASSIUM  --   --  4.5  --  4.4   CHLORIDE  --   --  106  --  107   CO2  --   --  26  --  25   BUN  --   --  22  --  20   CR  --   --  0.87  --  1.08   ANIONGAP  --   --  5  --  7   ALLISON  --   --  9.1  --  9.6   GLC  --   --  181*  --  228*   ALBUMIN  --   --  3.5  --   --    PROTTOTAL  --   --  7.0  --   --    BILITOTAL  --   --  0.6  --   --    ALKPHOS  --   --  73  --   --    ALT  --   --  19  --   --    AST  --   --  26  --   --    TROPI 0.055*  --  0.034 0.040 0.033       Imaging  Recent Results (from the past 24 hour(s))   MR Brain w/o Contrast    Narrative    MRI OF THE BRAIN WITHOUT CONTRAST 3/7/2020 4:10 PM     HISTORY: Repeated episodes of altered mental status and  facial droop.  Possible seizure activity.    COMPARISON: Head MRI 3/5/2020.    TECHNIQUE: Sagittal T1-weighted, axial T2-weighted, axial FLAIR, and  axial diffusion-weighted MR images of the brain were acquired without  intravenous contrast.    FINDINGS:   INTRACRANIAL CONTENTS: Subtle diffusion hyperintensity in the right  superior parietal lobule noted on images 90 through 84. Similar but  less pronounced findings on the left. There is no associated signal  loss on ADC images. This signal involves the cortex and underlying  cortical white matter arguing against seizure activity. Furthermore  these findings correlate with an area of motion. The diffusion signal  in the parietal lobes is most consistent with artifact.     No evidence of an acute stroke. No mass or hemorrhage. Left frontal  encephalomalacia again identified. The known infarcts in the left  thalamus and right cerebellum are suboptimally visualized due to  patient motion. Moderate to severe supratentorial and mild to moderate  pontine presumed chronic small vessel ischemic change again noted.  Unchanged moderate generalized volume loss.    SELLA: No significant abnormality accounting for technique.    OSSEOUS STRUCTURES/SOFT TISSUES: No aggressive osseous lesion  involving the calvarium, skull base, or visualized upper cervical  spine. Absent left ICA flow void again noted.    ORBITS: No significant abnormality accounting for technique.    SINUSES/MASTOIDS: No significant paranasal sinus mucosal disease. No  significant middle ear or mastoid effusion.       Impression    IMPRESSION:  HEAD MRI:  1.  No new abnormality. No change.  2.  Subtle diffusion hyperintensity in the superior parietal lobules  right greater than left. It correlates with an area of motion  artifact.  3.  Advanced presumed chronic small vessel ischemic change.  4.  Left frontal encephalomalacia.  5.  The known left thalamic and right cerebellar infarcts are obscured  by patient  motion.  6.  Absent left ICA flow void.    JW CONNER MD

## 2020-03-08 NOTE — PLAN OF CARE
Discharge Planner PT   Patient plan for discharge: Spouse reports she doesn't think she would feel comfortable caring for patient with current level of mobility  Current status: Pt transferred supine to sit on EOB, CGA needed. Pt transferred STS with CGA. When first standing pt very unstable and has LOB backwards, pt unable to correct and needs Oni. Pt performed repeated STS x3 with CGA. Gait training 200' with FWW and Oni progressing to CGA. Noted LOB x2 when turning. Following gait pt returned to supine with CGA, left with alarm on and needs in reach.  Barriers to return to prior living situation: decreased ability to follow commands, decreased activity tolerance, below baseline, fall risk, unsteadiness with ambulation   Recommendations for discharge: TCU per plan established by the PT.  Rationale for recommendations: Patient is below baseline for functional mobility & would benefit from continued physical therapy in the setting of a TCU for improving functional mobility, LE strength and tolerance for functional activities in order to return to baseline of functioning.        Entered by: Amy Snell 03/08/2020 1:55 PM

## 2020-03-08 NOTE — PROGRESS NOTES
[facility]    Neurology Progress Note    Date of Service (when I saw the patient): 03/08/2020     Today's developments: Doing well, no events, tolerating vimpat and keppra well.  If no events today, can go to TCU tomorrow.    Assessment & Plan   Dustin Pearce is a 89 year old male who was admitted on 3/5/2020. I was asked to see the patient for repeated episodes of AMS, l facial droop, likely seizures, negative MRI brain 3-5 for acute changes.        --Continue Keppra 750 bid.   --Another similar episode 3-7: back to baseline now (vimpat started after 3-7 event)  --Continue vimpat 50 mg bid  (NE interval normal) (some meds eg dilantin and depakote and oxcarb will interfere with his Brilinta.  Sulfa allergy so will hold on zonisamide).  Will consider increase dose as needed.      --Given keppra bolus 1000 mg IV on 3-6.  I discussed the above diagnosis, assessment, testing, and results with the patient.      Dee Chaney MD          Interval History   No recent neuro changes/events.  MRI yesterday showed no new findings- had a lot of motion artifact.      Physical Exam   Temp: 97.5  F (36.4  C) Temp src: Oral BP: 108/55   Heart Rate: 64 Resp: 16 SpO2: 97 % O2 Device: None (Room air)    Vitals:    03/07/20 0500   Weight: 71.4 kg (157 lb 6.5 oz)     Vital Signs with Ranges  Temp:  [97.5  F (36.4  C)-98.1  F (36.7  C)] 97.5  F (36.4  C)  Heart Rate:  [56-71] 64  Resp:  [16-20] 16  BP: (108-140)/(44-64) 108/55  SpO2:  [91 %-98 %] 97 %  I/O last 3 completed shifts:  In: -   Out: 250 [Urine:250]      General Appearance:  No acute distress  Neuro:       Mental Status Exam:    A,A, NAD, knows date, where he is, knows his wife, who is at bedside.  Unable to find name for his sister-in-law, who he usually knows without any problems.       Cranial Nerves:   EOMI, face equal and symmetric, speech normal        Motor:   5/5 BUE and BLE         Sensory:  Nl lt BUE               CV: RRR nl s1,  s2  Resp: CTAB    Extremities: No clubbing, no cyanosis, no edema       Medications     - MEDICATION INSTRUCTIONS -         aspirin  81 mg Oral Daily     atorvastatin  40 mg Oral Daily     DULoxetine  30 mg Oral Daily     insulin aspart  1-7 Units Subcutaneous TID AC     insulin aspart  1-5 Units Subcutaneous At Bedtime     insulin glargine  10 Units Subcutaneous At Bedtime     ipratropium  1 spray Both Nostrils BID     lacosamide  50 mg Oral BID     levETIRAcetam  750 mg Oral BID     magnesium oxide  400 mg Oral BID     metoprolol succinate ER  12.5 mg Oral At Bedtime     omeprazole  20 mg Oral QAM     sennosides  2 tablet Oral Daily     tamsulosin  0.4 mg Oral Daily     ticagrelor  90 mg Oral BID       Data   Results for orders placed or performed during the hospital encounter of 03/05/20 (from the past 24 hour(s))   MR Brain w/o Contrast    Narrative    MRI OF THE BRAIN WITHOUT CONTRAST 3/7/2020 4:10 PM     HISTORY: Repeated episodes of altered mental status and facial droop.  Possible seizure activity.    COMPARISON: Head MRI 3/5/2020.    TECHNIQUE: Sagittal T1-weighted, axial T2-weighted, axial FLAIR, and  axial diffusion-weighted MR images of the brain were acquired without  intravenous contrast.    FINDINGS:   INTRACRANIAL CONTENTS: Subtle diffusion hyperintensity in the right  superior parietal lobule noted on images 90 through 84. Similar but  less pronounced findings on the left. There is no associated signal  loss on ADC images. This signal involves the cortex and underlying  cortical white matter arguing against seizure activity. Furthermore  these findings correlate with an area of motion. The diffusion signal  in the parietal lobes is most consistent with artifact.     No evidence of an acute stroke. No mass or hemorrhage. Left frontal  encephalomalacia again identified. The known infarcts in the left  thalamus and right cerebellum are suboptimally visualized due to  patient motion. Moderate to severe  supratentorial and mild to moderate  pontine presumed chronic small vessel ischemic change again noted.  Unchanged moderate generalized volume loss.    SELLA: No significant abnormality accounting for technique.    OSSEOUS STRUCTURES/SOFT TISSUES: No aggressive osseous lesion  involving the calvarium, skull base, or visualized upper cervical  spine. Absent left ICA flow void again noted.    ORBITS: No significant abnormality accounting for technique.    SINUSES/MASTOIDS: No significant paranasal sinus mucosal disease. No  significant middle ear or mastoid effusion.       Impression    IMPRESSION:  HEAD MRI:  1.  No new abnormality. No change.  2.  Subtle diffusion hyperintensity in the superior parietal lobules  right greater than left. It correlates with an area of motion  artifact.  3.  Advanced presumed chronic small vessel ischemic change.  4.  Left frontal encephalomalacia.  5.  The known left thalamic and right cerebellar infarcts are obscured  by patient motion.  6.  Absent left ICA flow void.    JW CONNER MD   Glucose by meter   Result Value Ref Range    Glucose 165 (H) 70 - 99 mg/dL   Glucose by meter   Result Value Ref Range    Glucose 132 (H) 70 - 99 mg/dL   Glucose by meter   Result Value Ref Range    Glucose 118 (H) 70 - 99 mg/dL         Images and Procedures:  I personally reviewed the images of the following studies:  MRI brain diffusion changes look like motion artifact, are bilateral and subtle and do not fit with a stroke.  MRI brain unchanged from previous.

## 2020-03-08 NOTE — PROGRESS NOTES
Paged by RN requesting Vimpat be changed from oral to IV formulation given NPO status. Chart reviewed, note recurrent slurred speech, left facial droop, and suspected possible subclinical seizures today. Discussed with pharmacist, 1:1 ratio. Ordered Vimpat 50 mg IV BID.

## 2020-03-09 NOTE — PROGRESS NOTES
Sw Progress Note  Chart Reviewed, Pt discussed in Interdisciplinary Rounds.   Pt anticipated to discharge to [facility] when medically stable.    Intervention:   hospitalist here to see pt and determined pt is clear to discharge to TCU.  HUBER contacted Ch[facility] at [facility] and they are able to accept pt this afternoon.  HUBER met with pt and pt's SO regarding transportation. [facility] transportation via w/c is requested.  HUBER contacted [facility] and spoke with Amrielena to arrange for a w/c ride for 14:00 today.  HUBER confirmed time with Chula at [facility].  ..PAS-RR    D: Per [facility] regulation, HUBER completed and submitted PAS-RR to [place] [employer] Direct Connect via the Senior LinkAge Line.    PAS-RR confirmation # is :[id number]  P: Further questions may be directed to [facility] LinkAge Line at #[phone], option #4 for PAS-RR staff.    Send discharge orders and script when completed.      Team Members notified:   MIREILLE,RN,HUC,BB    Plan: Discharge to [facility] via [facility] e/c at 14:00 today    ROYA Moctezuma  [facility]  [phone]

## 2020-03-09 NOTE — DISCHARGE SUMMARY
[facility]    Discharge Summary  Hospitalist    Date of Admission:  3/5/2020  Date of Discharge:  3/9/2020  2:05 PM  Discharging Provider: Patrizia Couch MD    Discharge Diagnoses   seizure    History of Present Illness    review admission history and physical    Hospital Course   Dustin Pearce was admitted on 3/5/2020.  The following problems were addressed during his hospitalization:    Active Problems:    Seizure (H)    Altered mental status    Dustin Pearce is 89 with a complicated cardiac and neurological history with chronic carotid stenosis with 100% occlusion and a prior infarction admitted with multiple episodes of recurrent facial droop, slurred speech, and weakness with a negative stroke workup [CT and MRI], thought to be maybe possible breakthrough seizures.     PLAN:   1.  Recurrent slurred speech, left facial droop, suspected possible seizures.  The patient is admitted to the neuro floor for continued neurological monitoring.  Patient had similar presentation in August, 2019 and had extensive work-up.  Seizure was suspected back then.  He had continuous EEG monitoring that was negative for seizure activity back then.    PTA anticonvulsant regimen is 500 mg of Keppra twice a day.  The patient did receive 1 gram of Keppra IV loading dose in the ED.  Continued on increased dose of 750 twice a day.   EEG done on 3/6 did not show any epileptiform discharges, showed findings consistent with moderate diffuse nonspecific encephalopathy.  Had recurrent slurring of speech with drooling on left side noted and was thereby switched to n.p.o. per speech pathologist.   Neurologist saw the patient on 3/6.  They felt that his presentation was consistent with breakthrough seizures and patient was given additional 1000 mg IV Keppra bolus.  Continued on 750 mg twice daily of Keppra.  When he was more alert and in between episodes he was able to swallow without difficulty and was allowed regular  diet.  Changed patient to inpatient status given continued recurrent episodes of slurred speech and him needing further evaluation by neurologist and speech pathologist.   On 3/7: He had a another similar episode.  He was given a dose of IV fosphenytoin.  He was later started on Vimpat in addition to continuing the Keppra per neurology.  He has remained stable with no further episodes since 3/7.  Is being followed by speech pathology.  Is currently on DDL 1.  2.  Atherosclerotic cardiovascular disease.    Continue on aspirin, statin, metoprolol, Brilinta .  Echocardiogram done on 3/6 showed EF at 30 to 35%, early diastolic dysfunction, regional wall motion abnormalities, moderate aortic stenosis.  Hemodynamically stable.  No cardiac symptoms.  3.  History of benign prostatic hypertrophy.  Continue on tamsulosin.   4.  Gastroesophageal reflux disease.    Continue on Prilosec.   5.  Diabetes.    Continue decreased dose of Lantus and cover with sliding scale insulin.    6.  Depression and anxiety.   Continue on Cymbalta.   7.  Deep venous thrombosis prophylaxis.    PCD's    Patrizia Couch MD, MD    Significant Results and Procedures       Pending Results     Unresulted Labs Ordered in the Past 30 Days of this Admission     No orders found from 2/4/2020 to 3/6/2020.          Code Status   Full Code       Primary Care Physician   Matt Morrissey    Physical Exam   Temp: 97.5  F (36.4  C) Temp src: Oral BP: (!) 143/69   Heart Rate: 57 Resp: 18 SpO2: 98 % O2 Device: None (Room air)    Vitals:    03/07/20 0500 03/09/20 0500   Weight: 71.4 kg (157 lb 6.5 oz) 67.9 kg (149 lb 11.1 oz)     Vital Signs with Ranges  Temp:  [97.4  F (36.3  C)-97.5  F (36.4  C)] 97.5  F (36.4  C)  Heart Rate:  [57-78] 57  Resp:  [18] 18  BP: (131-164)/(51-78) 143/69  SpO2:  [93 %-98 %] 98 %  I/O last 3 completed shifts:  In: -   Out: 200 [Urine:200]    The patient was examined on the day of discharge.    Discharge Disposition   Discharged to  nursing home  Condition at discharge: Stable    Consultations This Hospital Stay   NEUROLOGY IP CONSULT  PHYSICAL THERAPY ADULT IP CONSULT  OCCUPATIONAL THERAPY ADULT IP CONSULT  SPEECH LANGUAGE PATH ADULT IP CONSULT  SWALLOW EVAL SPEECH PATH AT BEDSIDE IP CONSULT  SMOKING CESSATION PROGRAM IP CONSULT  NEUROLOGY IP CONSULT  SOCIAL WORK IP CONSULT  PHYSICAL THERAPY ADULT IP CONSULT  OCCUPATIONAL THERAPY ADULT IP CONSULT    Time Spent on this Encounter   IPatrizia MD, personally saw the patient today and spent greater than 30 minutes discharging this patient.    Discharge Orders      General info for SNF    Length of Stay Estimate: Short Term Care: Estimated # of Days <30  Condition at Discharge: Improving  Level of care:skilled   Rehabilitation Potential: Good  Admission H&P remains valid and up-to-date: Yes  Recent Chemotherapy: N/A  Use Nursing Home Standing Orders: Yes     Mantoux instructions    Give two-step Mantoux (PPD) Per Facility Policy Yes     Reason for your hospital stay    Seizures     Glucose monitor nursing POCT    Before meals and at bedtime     Intake and output    Every shift     Daily weights    Call Provider for weight gain of more than 2 pounds per day or 5 pounds per week.     Follow Up and recommended labs and tests    Follow up with [facility].  The following labs/tests are recommended: basic metabolic panel in 4- days .     Activity - Up with nursing assistance     Full Code     Physical Therapy Adult Consult    Evaluate and treat as clinically indicated.    Reason:  Deconditioning     Occupational Therapy Adult Consult    Evaluate and treat as clinically indicated.    Reason:  Deconditioning     Fall precautions     Seizure precautions     Advance Diet as Tolerated    Follow this diet upon discharge: Orders Placed This Encounter      Combination Diet 5190-3100 Calories: Moderate Consistent CHO (4-6 CHO units/meal); Dysphagia Diet Level 1: Pureed; Thin Liquids (water,  ice chips, juice, milk gelatin, ice cream, etc) (No straws. 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)      levETIRAcetam (KEPPRA) 750 MG tablet Take 1 tablet (750 mg) by mouth 2 times daily  Qty:      Associated Diagnoses: Seizure (H)         CONTINUE these medications which have NOT CHANGED    Details   acetaminophen (TYLENOL) 500 MG tablet Take 500-1,000 mg by mouth every 6 hours as needed for mild pain      aspirin EC 81 MG EC tablet Take 1 tablet (81 mg) by mouth daily  Qty: 30 tablet, Refills: 0    Associated Diagnoses: Transient cerebral ischemia, unspecified type      atorvastatin (LIPITOR) 40 MG tablet Take 40 mg by mouth daily      bisacodyl (DULCOLAX) 10 MG suppository Place 1 suppository (10 mg) rectally daily as needed for constipation  Qty: 4 suppository, Refills: 11    Associated Diagnoses: Constipation, unspecified constipation type      DULoxetine (CYMBALTA) 30 MG capsule TAKE 1 CAPSULE(30 MG) BY MOUTH DAILY  Qty:  90 capsule, Refills: 2    Associated Diagnoses: Polyneuropathy associated with underlying disease (H)      ipratropium (ATROVENT) 0.03 % nasal spray INHALE 1 SPRAY IN EACH NOSTRIL EVERY 12 HOURS AS NEEDED  Qty: 30 mL, Refills: 11    Associated Diagnoses: Runny nose      MAGNESIUM-OXIDE 400 (241.3 Mg) MG tablet TAKE 1 TABLET BY MOUTH TWICE DAILY  Qty: 180 tablet, Refills: 1    Associated Diagnoses: Constipation, unspecified constipation type      metFORMIN (GLUCOPHAGE) 1000 MG tablet TAKE 1 TABLET BY MOUTH TWICE DAILY WITH MEALS  Qty: 180 tablet, Refills: 0    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 If symptoms persist 5 minutes after 1st dose call 911.  Qty: 25 tablet, Refills: 0    Associated Diagnoses: Coronary artery disease involving native heart, angina presence unspecified, unspecified vessel or lesion type      omeprazole (PRILOSEC) 40 MG DR capsule TAKE 1 CAPSULE(40 MG) BY MOUTH DAILY 30 TO 60 MINUTES BEFORE A MEAL  Qty: 90 capsule, Refills: 0    Associated Diagnoses: Other acute gastritis without hemorrhage      polyethylene glycol (MIRALAX) packet Take 17 g by mouth 3 times daily as needed for constipation    Associated Diagnoses: Constipation, unspecified constipation type      polyethylene glycol-propylene glycol (SYSTANE ULTRA) 0.4-0.3 % SOLN ophthalmic solution Place 2 drops into both eyes daily as needed for dry eyes      sennosides (SENOKOT) 8.6 MG tablet Take 2 tablets by mouth daily    Associated Diagnoses: Constipation, unspecified constipation type      tamsulosin (FLOMAX) 0.4 MG capsule TAKE 1 CAPSULE BY MOUTH DAILY  Qty: 90 capsule, Refills: 3    Associated Diagnoses: Benign non-nodular prostatic hyperplasia with lower urinary tract symptoms  "     ticagrelor (BRILINTA) 90 MG tablet Take 1 tablet (90 mg) by mouth 2 times daily  Qty: 180 tablet, Refills: 1    Associated Diagnoses: TIA (transient ischemic attack)      blood glucose monitoring (NO BRAND SPECIFIED) meter device kit Use to test blood sugar bid times daily or as directed.  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 order for DME Equipment being ordered: wheeled walker with hand breaks and seat  Qty: 1 each, Refills: 0    Associated Diagnoses: Ataxia      !! order for DME Equipment being ordered: True Matrix Blood Glucose meter.  Qty: 1 Act, Refills: 11    Associated Diagnoses: Type 2 diabetes mellitus with complication, with long-term current use of insulin (H)      TRUE METRIX BLOOD GLUCOSE TEST test strip USE TO TEST THREE TIMES DAILY OR AS DIRECTED  Qty: 300 strip, Refills: 0    Associated Diagnoses: Hypoglycemia       !! - Potential duplicate medications found. Please discuss with provider.        Allergies   Allergies   Allergen Reactions     Oxycodone Other (See Comments)     \"TERRIBLE SWEATING\"     Sulfa Drugs      Tetracycline      Data   Most Recent 3 CBC's:  Recent Labs   Lab Test 03/06/20  0051 03/05/20  1411 12/31/19  1320   WBC 9.9 10.4 6.5   HGB 12.1* 12.4* 11.6*   MCV 88 90 91    195 167      Most Recent 3 BMP's:  Recent Labs   Lab Test 03/06/20  0014 03/05/20  1411 12/31/19  1320    139 139   POTASSIUM 4.5 4.4 4.7   CHLORIDE 106 107 107   CO2 26 25 26   BUN 22 20 15   CR 0.87 1.08 0.93   ANIONGAP 5 7 6   ALLISON 9.1 9.6 9.4   * 228* 337*     Most Recent 2 LFT's:  Recent Labs   Lab Test 03/06/20  0014 08/25/19  0620   AST 26 15   ALT 19 13   ALKPHOS 73 68   BILITOTAL 0.6 0.4     Most Recent INR's and Anticoagulation Dosing History:  Anticoagulation Dose History     Recent Dosing and Labs Latest Ref Rng & Units 1/20/2017 12/31/2017 4/8/2019 6/30/2019 7/16/2019 8/24/2019 3/5/2020    INR 0.86 - 1.14 1.03 " 0.99 1.00 1.01 0.98 0.94 1.07        Most Recent 3 Troponin's:  Recent Labs   Lab Test 03/06/20  0714 03/06/20  0014 03/05/20  1610  05/27/18  0119   TROPI 0.055* 0.034 0.040   < >  --    TROPONIN  --   --   --   --  0.00    < > = values in this interval not displayed.     Most Recent Cholesterol Panel:  Recent Labs   Lab Test 03/06/20  0014   CHOL 146   LDL 69   HDL 52   TRIG 123     Most Recent 6 Bacteria Isolates From Any Culture (See EPIC Reports for Culture Details):  Recent Labs   Lab Test 05/26/18  2246 05/26/18  2135 03/12/18  1454 02/08/18  0218 06/06/17  1846 06/04/17  1027   CULT No growth No growth  No growth >100,000 colonies/mL  Enterococcus faecalis  * >100,000 colonies/mL  Enterococcus faecalis  *  >100,000 colonies/mL  Strain 2  Enterococcus faecalis  * No anaerobes isolated  No growth No anaerobes isolated  Moderate growth Staphylococcus aureus*     Most Recent TSH, T4 and A1c Labs:  Recent Labs   Lab Test 03/05/20  1159  06/03/18  0520   TSH  --   --  4.45*   T4  --   --  0.98   A1C 8.3*   < >  --     < > = values in this interval not displayed.     Results for orders placed or performed during the hospital encounter of 03/05/20   MR Brain w/o Contrast    Narrative    MRI OF THE BRAIN WITHOUT CONTRAST 3/7/2020 4:10 PM     HISTORY: Repeated episodes of altered mental status and facial droop.  Possible seizure activity.    COMPARISON: Head MRI 3/5/2020.    TECHNIQUE: Sagittal T1-weighted, axial T2-weighted, axial FLAIR, and  axial diffusion-weighted MR images of the brain were acquired without  intravenous contrast.    FINDINGS:   INTRACRANIAL CONTENTS: Subtle diffusion hyperintensity in the right  superior parietal lobule noted on images 90 through 84. Similar but  less pronounced findings on the left. There is no associated signal  loss on ADC images. This signal involves the cortex and underlying  cortical white matter arguing against seizure activity. Furthermore  these findings correlate  with an area of motion. The diffusion signal  in the parietal lobes is most consistent with artifact.     No evidence of an acute stroke. No mass or hemorrhage. Left frontal  encephalomalacia again identified. The known infarcts in the left  thalamus and right cerebellum are suboptimally visualized due to  patient motion. Moderate to severe supratentorial and mild to moderate  pontine presumed chronic small vessel ischemic change again noted.  Unchanged moderate generalized volume loss.    SELLA: No significant abnormality accounting for technique.    OSSEOUS STRUCTURES/SOFT TISSUES: No aggressive osseous lesion  involving the calvarium, skull base, or visualized upper cervical  spine. Absent left ICA flow void again noted.    ORBITS: No significant abnormality accounting for technique.    SINUSES/MASTOIDS: No significant paranasal sinus mucosal disease. No  significant middle ear or mastoid effusion.       Impression    IMPRESSION:  HEAD MRI:  1.  No new abnormality. No change.  2.  Subtle diffusion hyperintensity in the superior parietal lobules  right greater than left. It correlates with an area of motion  artifact.  3.  Advanced presumed chronic small vessel ischemic change.  4.  Left frontal encephalomalacia.  5.  The known left thalamic and right cerebellar infarcts are obscured  by patient motion.  6.  Absent left ICA flow void.    JW CONNER MD   Echocardiogram Complete - Bubble study    Narrative    [id number]  [id number]  [id number]  [id number]^ANNABEL^JACK^ANASTACIO           [facility]  Echocardiography Laboratory  [address] [place] [address]        Name: SID AGUIAR  MRN: [id number]  : 1928  Study Date: 2020 11:44 AM  Age: 89 yrs  Gender: Male  Patient Location: [facility]  Reason For Study: TIA  Ordering Physician: JACK JIN  Referring Physician: Matt Morrissey  Performed By: Brian Watson     BSA: 1.9 m2  Height: 72 in  Weight: 149 lb  HR: 68  BP: 149/114  mmHg  _____________________________________________________________________________  __        Procedure  Complete Portable Echo Adult.  _____________________________________________________________________________  __        Interpretation Summary     The visual ejection fraction is estimated at 30-35%.  There are regional wall motion abnormalities as specified.  There is mild to moderate (1-2+) mitral regurgitation.  Moderate valvular aortic stenosis. Low gradient moderate aortic stenosis is  present.  The study was technically difficult.  _____________________________________________________________________________  __        Left Ventricle  The left ventricle is normal in size. There is normal left ventricular wall  thickness. The visual ejection fraction is estimated at 30-35%. Grade I or  early diastolic dysfunction. Diastolic function not assessed due to  significant mitral annular calcification. Mid to basal inferolateral akinesis.  Basal inferior akinesis. Moderate global hypokinesis with discrete regional  wall motion abnormalities is present. There is moderate global hypokinesia of  the left ventricle. There are regional wall motion abnormalities as specified.     Right Ventricle  The right ventricle is normal in size and function.     Atria  The left atrium is moderately dilated. Right atrial size is normal. Lipomatous  hypertrophy of the interatrial septum is noted.     Mitral Valve  There is moderate to severe mitral annular calcification. The mitral valve  leaflets are moderately thickened. The mitral papillary muscle appears  thickened and/or calcified. There is mild to moderate (1-2+) mitral  regurgitation. The mean mitral valve gradient is 3.4 mmHg.        Tricuspid Valve  There is mild (1+) tricuspid regurgitation. The right ventricular systolic  pressure is approximated at 15.1 mmHg plus the right atrial pressure. Right  ventricular systolic pressure could be underestimated due to  incomplete  tricuspid regurgitation velocity envelope. IVC diameter <2.1 cm collapsing  >50% with sniff suggests a normal RA pressure of 3 mmHg.     Aortic Valve  The aortic valve is not well visualized. Thickened aortic valve leaflets. No  aortic regurgitation is present. The peak AoV pressure gradient is 17.0 mmHg.  The mean AoV pressure gradient is 7.6 mmHg. The calculated aortic valve are is  1.2 cm^2. Moderate valvular aortic stenosis.     Pulmonic Valve  There is mild (1+) pulmonic valvular regurgitation. Normal pulmonic valve  velocity.     Vessels  The aortic root is normal size. Normal size ascending aorta.     Pericardium  There is no pericardial effusion.        Rhythm  Sinus rhythm was noted. The patient exhibited occasional PACs.  _____________________________________________________________________________  __  MMode/2D Measurements & Calculations  IVSd: 1.1 cm     LVIDd: 5.4 cm  LVIDs: 5.0 cm  LVPWd: 1.1 cm  FS: 6.9 %  LV mass(C)d: 241.8 grams  LV mass(C)dI: 128.6 grams/m2  Ao root diam: 3.2 cm  asc Aorta Diam: 3.2 cm  LVOT diam: 2.3 cm  LVOT area: 4.2 cm2  LA Volume (BP): 77.1 ml  LA Volume Index (BP): 41.0 ml/m2  RWT: 0.43           Doppler Measurements & Calculations  MV E max donnie: 91.2 cm/sec  MV A max donnie: 144.5 cm/sec  MV E/A: 0.63  MV max P.7 mmHg  MV mean PG: 3.4 mmHg  MV V2 VTI: 39.7 cm  MVA(VTI): 1.4 cm2  MV P1/2t max donnie: 99.0 cm/sec  MV P1/2t: 108.8 msec  MVA(P1/2t): 2.0 cm2  MV dec slope: 266.6 cm/sec2  MV dec time: 0.30 sec  Ao V2 max: 203.3 cm/sec  Ao max P.0 mmHg  Ao V2 mean: 127.1 cm/sec  Ao mean P.6 mmHg  Ao V2 VTI: 46.9 cm  YULIYA(I,D): 1.2 cm2  YULIYA(V,D): 1.3 cm2  LV V1 max P.5 mmHg  LV V1 max: 61.6 cm/sec  LV V1 VTI: 13.3 cm  SV(LVOT): 55.2 ml  SI(LVOT): 29.4 ml/m2  PA acc time: 0.06 sec  TR max donnie: 193.4 cm/sec  TR max PG: 15.1 mmHg  Pulm Sys Donnie: 40.7 cm/sec  Pulm Delgado Donnie: 29.1 cm/sec  Pulm S/D: 1.4  AV Donnie Ratio (DI): 0.30  YULIYA Index (cm2/m2): 0.63  E/E' avg:  28.6  Lateral E/e': 31.0  Medial E/e': 26.2              _____________________________________________________________________________  __        Report approved by: Dulce Maria Ramírez 03/06/2020 01:17 PM

## 2020-03-09 NOTE — PLAN OF CARE
Discharge Planner SLP   Patient plan for discharge: TCU  Current status:   Swallowing tx provided this AM. Pt's wife at bedside reported pt eats a regular diet at home and does not usually have difficulty swallowing. Pt consumed 3 oz of thin liquid by cup sip with no overt s/sx of aspiration observed with a timely swallow. x7 trials of puree were given per tsp and pt had poor AP movement with what looked like tongue pumping of bolus and 3-5 second swallow delay. x2 overt coughing episodes were observed during pudding trials likely from pharyngeal residue. Discussion with pt/wife about not upgrading diet due to coughing on puree, and to consider OP video swallow study if swallowing issues persist. Plan to follow up to monitor diet tollerance and upgrade if possible, however pt likely discharging to TCU.     Recommend: 1. DDL 1 with thin liquids. 2. Up in a chair for all meals, feed only when fully alert, no straws, small bites/sips, alternate liquids/solids every few bites. Crush medications and place in pudding. Hold diet if increased coughing or changes in his respiratory status.   Barriers to return to prior living situation: dysphagia and deconditioning  Recommendations for discharge: TCU  Rationale for recommendations: Will need on going ST needs for dysphagia management for diet tolerance and advancement. Patient is well below his baseline.        Entered by: Amna Bryant 03/09/2020 11:38 AM

## 2020-03-09 NOTE — PROGRESS NOTES
Pt discharge paperwork reviewed with pt and spouse and discharge paperwork packet to be given to transport to drop off with pt when leaves.  discharge

## 2020-03-09 NOTE — PLAN OF CARE
Occupational Therapy Discharge Summary    Reason for therapy discharge:    Discharged to transitional care facility.    Progress towards therapy goal(s). See goals on Care Plan in [facility] electronic health record for goal details.  Goals not met.  Barriers to achieving goals:   discharge from facility.    Therapy recommendation(s):    Continued therapy is recommended.  Rationale/Recommendations:  OT in TCU setting to address safety and independence in I/ADLs.

## 2020-03-09 NOTE — PROGRESS NOTES
Pt up with ax1 walker and gait belt.  Pt ate well at breakfast, iv removed and tele  Removed as pt to discharge to TCU at 1400 to TCU via transport, wife at bedside and aware.  Pt denies pain.  Pt discharge paperwork to be sent with pt and reviewed with pt prior to discharge

## 2020-03-09 NOTE — PLAN OF CARE
Discharge Planner PT   Patient plan for discharge: Spouse reports she doesn't think she would feel comfortable caring for patient with current level of mobility  Current status: Pt transferred supine to/from sit on EOB with HOB elevated slightly and use of side rail with slow LE movements closer to EOB. Pt transferred STS with CGA, when standing A needed to pull briefs up. Gait training 270' with FWW and CGA. Following gait pt returned to supine with CGA needed to get LE up on bed and to assist in rotating shoulders. Pt left with other staff present.   Barriers to return to prior living situation: decreased ability to follow commands, decreased activity tolerance, below baseline, fall risk, unsteadiness with ambulation   Recommendations for discharge: TCU per plan established by the PT.  Rationale for recommendations: Patient is below baseline for functional mobility & would benefit from continued physical therapy in the setting of a TCU for improving functional mobility, LE strength and tolerance for functional activities in order to return to baseline of functioning.       Entered by: Amy Snell 03/09/2020 11:09 AM

## 2020-03-09 NOTE — PLAN OF CARE
Pt here with AMS, recurrent slurred speech, and L facial droop- possible subclinical seizures. A&Ox3- disorientated to time, forgetful. Neuros intact, ex confusion. CMS intact. VSS, on RA. Tele: NSR. Continent of B/B. No episodes reported or noted this shift. Up with assist of 1 walker and gait belt, unsteady. On a Dd1 Mod CHO diet with thin liquids-no straw. Takes pills whole with water. 1 Unit of sliding scale insulin given. Denies pain. Pt scoring yellow on aggression stop light tool, d/t confusion. Plan: discharge to TCU, possibly 3/9/20.

## 2020-03-10 NOTE — LETTER
To:             Please give to facility    From:   Farnaz Eddy RN, Care Coordinator   [facility] -Care Coordination  [facility]   E-mail [email or web address]   [phone]    Patient Name:  Dustin Pearce YOB: 1928   Admit date: 3/9/2020      *Information Needed:  Please contact me when the patient will discharge (or if they will move to long term care)- include the discharge date, disposition, and main diagnosis   - If the patient is discharged with home care services, please provide the name of the agency    Also- Please inform me if a care conference is being held.   Farnaz Eddy RN, Care Coordinator   [facility] -Care Coordination  [facility]   E-mail [email or web address]   [phone]                              Thank you

## 2020-03-10 NOTE — PROGRESS NOTES
Clinic Care Coordination Contact  Care Coordination Transition Communication    Referral Source: IP Report    Clinical Data: Patient was hospitalized at [facility]  from 3/5-3/9/2020 with diagnosis of Seizure and Altered mental status .     Transition to Facility:              Facility Name: [facility]             Contact name and phone number/fax:Faxed CC contact information to facility to notify when patient is discharged from TCU   Plan: RN/SW Care Coordinator will await notification from facility staff informing RN/SW Care Coordinator of patient's discharge plans/needs. RN/SW Care Coordinator will review chart and outreach to facility staff every 4 weeks and as needed.     [facility]     Farnaz Eddy  RN Care Coordinator   [facility] / [facility]   Phone: [phone]  Email :  [email or web address]

## 2020-03-10 NOTE — PROGRESS NOTES
Speech Language Therapy Discharge Summary    Reason for therapy discharge:    Discharged to transitional care facility.    Progress towards therapy goal(s). See goals on Care Plan in [facility] electronic health record for goal details.  Goals partially met.  Barriers to achieving goals:   discharge from facility.    Therapy recommendation(s):    Continued therapy is recommended.  Rationale/Recommendations:  see notes below from 3/9 last SLP session.    Discharge Planner SLP   Patient plan for discharge: TCU  Current status:   Swallowing tx provided this AM. Pt's wife at bedside reported pt eats a regular diet at home and does not usually have difficulty swallowing. Pt consumed 3 oz of thin liquid by cup sip with no overt s/sx of aspiration observed with a timely swallow. x7 trials of puree were given per tsp and pt had poor AP movement with what looked like tongue pumping of bolus and 3-5 second swallow delay. x2 overt coughing episodes were observed during pudding trials likely from pharyngeal residue. Discussion with pt/wife about not upgrading diet due to coughing on puree, and to consider OP video swallow study if swallowing issues persist. Plan to follow up to monitor diet tollerance and upgrade if possible, however pt likely discharging to TCU.      Recommend: 1. DDL 1 with thin liquids. 2. Up in a chair for all meals, feed only when fully alert, no straws, small bites/sips, alternate liquids/solids every few bites. Crush medications and place in pudding. Hold diet if increased coughing or changes in his respiratory status.   Barriers to return to prior living situation: dysphagia and deconditioning  Recommendations for discharge: TCU  Rationale for recommendations: Will need on going ST needs for dysphagia management for diet tolerance and advancement. Patient is well below his baseline

## 2020-03-10 NOTE — PLAN OF CARE
Physical Therapy Discharge Summary    Reason for therapy discharge:    Discharged to transitional care facility.    Progress towards therapy goal(s). See goals on Care Plan in [facility] electronic health record for goal details.  Goals not met.  Barriers to achieving goals:   discharge from facility.    Therapy recommendation(s):    Continued therapy is recommended.  Rationale/Recommendations:  PT at TCU to progress mobility.

## 2020-03-12 NOTE — PROGRESS NOTES
[facility]  INITIAL VISIT NOTE  March 12, 2020    PRIMARY CARE PROVIDER AND CLINIC:  Matt Morrissey [facility] [address] / [place] [address]    Chief Complaint   Patient presents with     Hospital F/U     MD Initial        HPI:    Dustin Pearce is a 89 year old  (1/31/1928) male who was seen at [facility] in [place] on March 12, 2020 for an initial visit. Medical history is notable for coronary artery disease, hypertension, dyslipidemia, seizure disorder, diabetes mellitus type 2, peripheral arterial disease, paroxysmal atrial fibrillation, BPH, GERD, depression, TIA/CVA, and mild aortic stenosis.    Patient was hospitalized at [facility] from March 5 through March 9, 2020 after he presented with facial droop and slurred speech, similar to previous presentations concerning for seizure.  CBC, BMP, and LFTs were unremarkable.  Keppra level was 19 and therapeutic.  CT head without contrast showed old left frontal infarct but no acute ischemia or hemorrhage.  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  MRI of the brain showed multiple old infarcts but no evidence of acute ischemia or hemorrhage. Patient was evaluated by neurology service and felt that his presentation was consistent with breakthrough seizure.  Notably EEG monitoring showed presence of diffuse theta-delta slowing and slowing of the background consistent with moderate diffuse nonspecific encephalopathy, but no epileptiform discharges were present and no electrographic or clinical seizures were recorded.  Patient was treated with bolus of Keppra 1000 mg x2 and  PTA Keppra dose was increased to 750 mg p.o. twice daily.  On March 7, he had another similar episode and was given a dose of IV fosphenytoin.  He was later started on Vimpat in addition to Keppra per neurology.  Repeat MRI on March 7 showed no new  abnormalities.  SLP evaluation was performed and he was started on dysphagia level 1.  Of note, EKG showed normal sinus rhythm, left axis deviation, and right bundle branch block (noted previously as well).  Transthoracic echocardiogram, March 6, showed LV ejection fraction of 30-35%, grade 1 or early diastolic dysfunction, moderate aortic stenosis, and mild to moderate mitral regurgitation.    Patient is admitted to this facility for medical management, nursing care, and rehab.     Patient was seen today in his room, while sitting in wheelchair, accompanied by his significant other.  There is no report of further spells/seizures in TCU.  Reportedly, he had diarrhea secondary to stool softeners, which improved after withholding his stool softeners.  He reports no headache, fever, chills, chest pain, palpitation, nausea, vomiting, abdominal pain, or urinary symptoms.  According to the significant other, the patient has had decline in his memory recently.    CODE STATUS:   CPR/Full code     PAST MEDICAL HISTORY:   Three-vessel coronary artery disease, including  of RCA, severe proximal LAD, and severe proximal left circumflex disease, status post PCI to proximal LAD and proximal left circumflex in May 2018  TIA/CVA  Carotid artery stenosis, status post right endarterectomy in January 2018.  CT angio on March 5, 2020 showed chronic occlusion of left internal carotid artery.  Chronic systolic heart failure, last LV ejection fraction was 30-35% by echocardiogram on March 6, 2020  Grade 1 or early LV diastolic dysfunction by echocardiogram on March 6, 2020  Paroxysmal atrial fibrillation  Moderate aortic stenosis with aortic valve area of 1.2 cm  and mean pressure gradient of 7.6 mmHg by echocardiogram on March 6, 2020  Mild to moderate mitral regurgitation  Seizure disorder   Hypertension   Dyslipidemia  Diabetes mellitus type 2   Chronic anemia, baseline hemoglobin in the range of  10-12  BPH  GERD  Depression  History of left foot ulcer with osteomyelitis, status post left partial fifth ray amputation in June 2017  Peripheral arterial disease, status post left common iliac artery stent and left SFA angioplasty in June 2017  Abdominal aortic aneurysm  Nephrolithiasis  Impaired hearing  Low back pain  Osteopenia  UTI  Mild cognitive impairment    PAST SURGICAL HISTORY:   Past Surgical History:   Procedure Laterality Date     AMPUTATE FOOT Left 6/4/2017    Procedure: AMPUTATE FOOT;  Sun Add#3: partial left foot amputation - Sagital Saw - Mini C-Arm;  Surgeon: Moe Kirk DPM;  Location:  OR     CHOLECYSTECTOMY       ENDARTERECTOMY CAROTID Right 1/3/2018    Procedure: ENDARTERECTOMY CAROTID;  RIGHT CAROTID ENDARTERECTOMY WITH EEG;  Surgeon: Roland Rodriguez MD;  Location:  OR     ESOPHAGOSCOPY, GASTROSCOPY, DUODENOSCOPY (EGD), COMBINED N/A 12/26/2016    Procedure: COMBINED ESOPHAGOSCOPY, GASTROSCOPY, DUODENOSCOPY (EGD);  Surgeon: Nasim Louis MD;  Location:  GI     GENITOURINARY SURGERY      KIDNEY STONE REMOVAL X 4     HC UGI ENDOSCOPY W EUS N/A 12/29/2016    Procedure: COMBINED ENDOSCOPIC ULTRASOUND, ESOPHAGOSCOPY, GASTROSCOPY, DUODENOSCOPY (EGD);  Surgeon: Nasim Louis MD;  Location:  GI     HERNIA REPAIR       INCISION AND DRAINAGE FOOT, COMBINED Left 6/6/2017    Procedure: COMBINED INCISION AND DRAINAGE FOOT;  REVISIONAL LEFT FOOT INCISION AND DRAINAGE WITH POSSIBLE FLAP CLOSURE , ANTIBIOTIC SOLUTION ;  Surgeon: Nabil Ann DPM;  Location:  OR     LASER HOLMIUM LITHOTRIPSY URETER(S), INSERT STENT, COMBINED  3/2/2012    Procedure:COMBINED CYSTOSCOPY, URETEROSCOPY, LASER HOLMIUM LITHOTRIPSY URETER(S), INSERT STENT; CYSTOSCOPY, RIGHT RETROGRADES, RIGHT URETEROSCOPY, HOLMIUM LASER, RIGHT STENT PLACEMENT; Surgeon:ANJELICA LEÓN; Location: OR     ROTATOR CUFF REPAIR RT/LT         FAMILY HISTORY:   Family History   Problem Relation Age  of Onset     Breast Cancer Mother      Heart Failure Father 81       SOCIAL HISTORY:  He lives with his significant other in a townhouse.  He does use a walker for ambulation.    Social History     Tobacco Use     Smoking status: Former Smoker     Packs/day: 1.00     Years: 30.00     Pack years: 30.00     Types: Cigarettes     Start date:      Last attempt to quit: 1999     Years since quittin.2     Smokeless tobacco: Never Used   Substance Use Topics     Alcohol use: Not Currently     Alcohol/week: 7.0 standard drinks     Types: 7 Standard drinks or equivalent per week     Comment: 1 drink per biweekly       MEDICATIONS:  Current Outpatient Medications   Medication Sig Dispense Refill     acetaminophen (TYLENOL) 500 MG tablet Take 500-1,000 mg by mouth every 6 hours as needed for mild pain       aspirin EC 81 MG EC tablet Take 1 tablet (81 mg) by mouth daily 30 tablet 0     atorvastatin (LIPITOR) 40 MG tablet Take 40 mg by mouth daily       bisacodyl (DULCOLAX) 10 MG suppository Place 1 suppository (10 mg) rectally daily as needed for constipation 4 suppository 11     blood glucose monitoring (NO BRAND SPECIFIED) meter device kit Use to test blood sugar bid times daily or as directed. 1 kit 0     DULoxetine (CYMBALTA) 30 MG capsule TAKE 1 CAPSULE(30 MG) BY MOUTH DAILY 90 capsule 2     insulin aspart (NOVOLOG FLEXPEN) 100 UNIT/ML pen Novolog Flexpen  Give before meals and before bed:  For Pre-Meal Glucose:  140-189 give 1 unit   190-239 give 2 units   240-289 give 3 units   290-339 give 4 units   = or >340 give 5 units     For Bedtime Glucose  200 - 239 give 1 units   240 - 289 give 1.5 units  290 - 339 give 2 units  = or >340 give 2.5 units 15 mL 0     insulin glargine (LANTUS PEN) 100 UNIT/ML pen Inject 10 Units Subcutaneous At Bedtime       ipratropium (ATROVENT) 0.03 % nasal spray INHALE 1 SPRAY IN EACH NOSTRIL EVERY 12 HOURS AS NEEDED 30 mL 11     lacosamide (VIMPAT) 50 MG TABS tablet Take 1 tablet  "(50 mg) by mouth 2 times daily       levETIRAcetam (KEPPRA) 750 MG tablet Take 1 tablet (750 mg) by mouth 2 times daily       MAGNESIUM-OXIDE 400 (241.3 Mg) MG tablet TAKE 1 TABLET BY MOUTH TWICE DAILY 180 tablet 1     metFORMIN (GLUCOPHAGE) 1000 MG tablet TAKE 1 TABLET BY MOUTH TWICE DAILY WITH MEALS 180 tablet 0     metoprolol succinate ER (TOPROL-XL) 25 MG 24 hr tablet Take 12.5 mg by mouth At Bedtime       nitroGLYcerin (NITROSTAT) 0.4 MG sublingual tablet For chest pain place 1 tablet under the tongue every 5 minutes for 3 doses. If symptoms persist 5 minutes after 1st dose call 911. 25 tablet 0     omeprazole (PRILOSEC) 40 MG DR capsule TAKE 1 CAPSULE(40 MG) BY MOUTH DAILY 30 TO 60 MINUTES BEFORE A MEAL 90 capsule 0     order for DME Equipment being ordered: wheeled walker with hand breaks and seat 1 each 0     order for DME Equipment being ordered: True Matrix Blood Glucose meter. 1 Act 11     polyethylene glycol (MIRALAX) packet Take 17 g by mouth 3 times daily as needed for constipation       polyethylene glycol-propylene glycol (SYSTANE ULTRA) 0.4-0.3 % SOLN ophthalmic solution Place 2 drops into both eyes daily as needed for dry eyes       sennosides (SENOKOT) 8.6 MG tablet Take 2 tablets by mouth daily       tamsulosin (FLOMAX) 0.4 MG capsule TAKE 1 CAPSULE BY MOUTH DAILY 90 capsule 3     ticagrelor (BRILINTA) 90 MG tablet Take 1 tablet (90 mg) by mouth 2 times daily 180 tablet 1     TRUE METRIX BLOOD GLUCOSE TEST test strip USE TO TEST THREE TIMES DAILY OR AS DIRECTED 300 strip 0       ALLERGIES:  Allergies   Allergen Reactions     Oxycodone Other (See Comments)     \"TERRIBLE SWEATING\"     Sulfa Drugs      Tetracycline        ROS:  10 point ROS neg other than the symptoms noted above in the HPI.    PHYSICAL EXAM:  Vital signs: Blood pressure 118/61, heart rate 67, respiratory rate 16, temperature 97  F oxygen saturation 96%, weight 149.2 LBS  Gen: Cooperative and in no acute distress; weak " appearing  HEENT: Normocephalic; oropharynx clear  Card: Normal S1, S2, RRR  Resp: Lungs clear to auscultation bilaterally  GI: Abdomen soft, non-tender, non-distended, +BS  Ext: No LE edema  Neuro: CX II-XII grossly intact; normal speech, ROM in all four extremities grossly intact  Psych: Alert and oriented x1-2; normal affect  Skin: No acute rash    LABORATORY/IMAGING DATA:  Lab Results   Component Value Date    WBC 9.9 03/06/2020     Lab Results   Component Value Date    RBC 4.36 03/06/2020     Lab Results   Component Value Date    HGB 12.1 03/06/2020     Lab Results   Component Value Date    HCT 38.3 03/06/2020     Lab Results   Component Value Date    MCV 88 03/06/2020     Lab Results   Component Value Date    MCH 27.8 03/06/2020     Lab Results   Component Value Date    MCHC 31.6 03/06/2020     Lab Results   Component Value Date    RDW 14.3 03/06/2020     Lab Results   Component Value Date     03/06/2020     Last Comprehensive Metabolic Panel:  Sodium   Date Value Ref Range Status   03/06/2020 137 133 - 144 mmol/L Final     Potassium   Date Value Ref Range Status   03/06/2020 4.5 3.4 - 5.3 mmol/L Final     Comment:     Specimen slightly hemolyzed, potassium may be falsely elevated     Chloride   Date Value Ref Range Status   03/06/2020 106 94 - 109 mmol/L Final     Carbon Dioxide   Date Value Ref Range Status   03/06/2020 26 20 - 32 mmol/L Final     Anion Gap   Date Value Ref Range Status   03/06/2020 5 3 - 14 mmol/L Final     Glucose   Date Value Ref Range Status   03/06/2020 181 (H) 70 - 99 mg/dL Final     Urea Nitrogen   Date Value Ref Range Status   03/06/2020 22 7 - 30 mg/dL Final     Creatinine   Date Value Ref Range Status   03/06/2020 0.87 0.66 - 1.25 mg/dL Final     GFR Estimate   Date Value Ref Range Status   03/06/2020 75 >60 mL/min/[1.73_m2] Final     Comment:     Non  GFR Calc  Starting 12/18/2018, serum creatinine based estimated GFR (eGFR) will be   calculated using the  Chronic Kidney Disease Epidemiology Collaboration   (CKD-EPI) equation.       Calcium   Date Value Ref Range Status   03/06/2020 9.1 8.5 - 10.1 mg/dL Final         ASSESSMENT/PLAN:  Seizure, recurrent.  Patient has known history of slurred speech and facial droop spells, attributed to seizures.  He presented with another spell with negative work-up for acute stroke.  In the hospital, PTA Keppra dose was increased from 500 mg p.o. twice daily to 750 mg p.o. twice daily and started on lacosamide 50 mg p.o. twice daily.  No further spells since admission to TCU.  Plan:  Seizure precautions  Continue Keppra 750 mg p.o. twice daily and lacosamide 50 mg p.o. twice daily  Follow-up with neurology after discharge from TCU    Dysphagia.  He is currently on dysphagia diet level 1 with thin consistency.  Plan:  Continue DDL1  SLP evaluation and therapy    Diabetes mellitus type 2.  Last hemoglobin A1c level was 8.3% on March 5, 2020    Blood glucose levels are in the range of 160-220.  Plan:  Continue Lantus 10 units subcu at bedtime  Continue PTA metformin 1000 g p.o. twice daily  Continue NovoLog sliding scale  Diabetic diet  Continue to monitor blood glucose     Coronary artery disease, status post PCI to proximal LAD and proximal left circumflex in May 2018,  Hypertension,  Dyslipidemia,  Chronic systolic heart failure (ischemic cardiomyopathy), LV ejection fraction 30-35%,  Moderate aortic stenosis,   Mild to moderate mitral regurgitation,  Peripheral arterial disease,  History of CVA.  Stable.  Blood pressure is well controlled.  Plan:  Continue prior to admission aspirin 81 mg p.o. daily, Brilinta 90 mg p.o. twice daily, atorvastatin 40 mg p.o. daily, and metoprolol XL 12.5 mg p.o. at bedtime  PRN nitroglycerin sublingual is available    BPH.  Plan:  Continue prior to admission Flomax 0.4 mg p.o. daily     GERD.  Chronic.  Plan:  Continue prior to admission omeprazole 40 mg p.o.  daily     Depression.  Chronic.  Plan:  Continue prior to admission Cymbalta 30 mg p.o. daily    Chronic anemia.  Baseline hemoglobin is in the range of 10-12 and appears stable.  Plan:  Continue to monitor hemoglobin periodically    Slow transit constipation.  Plan:  Continue bowel regimen    Physical deconditioning.  Plan:  Continue PT/OT evaluation and therapy      Electronically signed by:  Fe Kim MD

## 2020-03-23 NOTE — PROGRESS NOTES
"[facility] DISCHARGE SUMMARY    PATIENT'S NAME: Dustin Pearce  YOB: 1928    PRIMARY CARE PROVIDER AND CLINIC RESPONSIBLE AFTER TRANSFER: Matt Morrissey     CODE STATUS: FULL Code    TRANSFERRING PROVIDERS: ASPEN Montes CNP, Dr. Fe Wallis MD      DATE OF SNF ADMISSION:  March / 09 / 2020    DATE OF SNF DISCHARGE (including anticipating DC): March / 27 / 2020    DISCHARGE DISPOSITION: [facility] Provider    Nursing Facility: [facility] stay 3/5/20 to 3/9/20.     Condition on Discharge:  Improving.    Function:  Ambulates: 185 ft w/ fww, Transfers: s  Cognitive Scores: SLUMS 3./30  --has been 18-19/30 last visits. Difficult to assess this stay.  Physical Function: Tinetti Balance Assessment: 22/40  and TUG 27  Equipment: walker  DME: Walker    DISCHARGE DIAGNOSIS:      Seizure disorder (H)  Dysphagia, unspecified type  Type 2 diabetes mellitus with hyperglycemia, with long-term current use of insulin (H)  Coronary artery disease involving native coronary artery of native heart without angina pectoris  Essential hypertension  Dyslipidemia  Chronic systolic heart failure (H)  Aortic valve stenosis, etiology of cardiac valve disease unspecified  Mitral valve insufficiency, unspecified etiology  PAD (peripheral artery disease) (H)  History of CVA (cerebrovascular accident)  Benign prostatic hyperplasia without lower urinary tract symptoms  Gastroesophageal reflux disease without esophagitis  Depression, unspecified depression type  Chronic anemia  Slow transit constipation  Physical deconditioning        HOSPITAL COURSE: COPIED FROM  DR WALLIS'S ADMIT NOTE AND HOSPITAL COURSE DATA: \"  \"Patient was hospitalized at [facility] from March 5 through March 9, 2020 after he presented with facial droop and slurred speech, similar to previous presentations concerning for seizure.  CBC, BMP, and LFTs were " "unremarkable.  Keppra level was 19 and therapeutic.  CT head without contrast showed old left frontal infarct but no acute ischemia or hemorrhage.  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  MRI of the brain showed multiple old infarcts but no evidence of acute ischemia or hemorrhage. Patient was evaluated by neurology service and felt that his presentation was consistent with breakthrough seizure.  Notably EEG monitoring showed presence of diffuse theta-delta slowing and slowing of the background consistent with moderate diffuse nonspecific encephalopathy, but no epileptiform discharges were present and no electrographic or clinical seizures were recorded.  Patient was treated with bolus of Keppra 1000 mg x2 and  PTA Keppra dose was increased to 750 mg p.o. twice daily.  On March 7, he had another similar episode and was given a dose of IV fosphenytoin.  He was later started on Vimpat in addition to Keppra per neurology.  Repeat MRI on March 7 showed no new abnormalities.  SLP evaluation was performed and he was started on dysphagia level 1.  Of note, EKG showed normal sinus rhythm, left axis deviation, and right bundle branch block (noted previously as well).  Transthoracic echocardiogram, March 6, showed LV ejection fraction of 30-35%, grade 1 or early diastolic dysfunction, moderate aortic stenosis, and mild to moderate mitral regurgitation.\"       TCU/SNF COURSE: Pt has progressed with PT and OT, main issue is nausea and occas emesis when he becomes very constipated.  He had prn Miralax but now is on bid scheduled and bid prn.  It appears better.  Accuchecks are running mostly mid to high 100's , occas >200 or 300.  Therapy data showed walking ~200 feet with walker and SBA.       PAST MEDICAL HISTORY:  Past Medical History:   Diagnosis Date     Abdominal aortic aneurysm (H) 12/25/2016     Acute on chronic systolic " congestive heart failure (H) 6/7/2018     Anemia 6/7/2018     Anemia due to blood loss, acute 6/13/2017     Aortic stenosis     mild     Benign essential hypertension 1/6/2017     Benign non-nodular prostatic hyperplasia with lower urinary tract symptoms 10/20/2016     CAD (coronary artery disease)     MI 1970     Carotid artery stenosis 10/20/2016    chronically occluded left internal carotid artery     Cerebrovascular accident (H) 10/20/2016     Cognitive impairment 6/7/2018     Coronary artery disease of native artery of native heart with stable angina pectoris (H) 10/20/2016    MI 1970     Depression, unspecified depression type 2/22/2018     Diabetes mellitus (H)      Diabetic ulcer of left foot associated with diabetes mellitus due to underlying condition (H) 6/12/2017     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     Gastro-oesophageal reflux disease      Gastroesophageal reflux disease without esophagitis 10/20/2016     Heart attack (H)      Hyperlipidemia      Hyperlipidemia, unspecified hyperlipidemia type 10/20/2016     Ischemic cardiomyopathy      Lumbar back pain 12/30/2016     Mild cognitive impairment 10/20/2016     Nephrolithiasis     RECURRENT     NSTEMI (non-ST elevated myocardial infarction) (H) 6/7/2018     Osteopenia      PAD (peripheral artery disease) (H)     peripheral angiogram 5/2017: The common iliac artery stenoses were stented and the superficial femoral artery stenoses were angioplastied     Paroxysmal atrial fibrillation (H)      Peripheral artery disease (H)     peripheral angiogram 5/2017: The common iliac artery stenoses were stented and the superficial femoral artery stenoses were angioplastied     RBBB with left anterior fascicular block      Slow transit constipation 2/22/2018     Stroke of unknown etiology (H) 12/31/2017     Syncope      TIA (transient ischemic attack) 1/20/2017     Unspecified cerebral artery occlusion with cerebral infarction      UTI  (urinary tract infection) due to Enterococcus 2/22/2018     Ventricular tachycardia (H)     nonsustained       DISCHARGE MEDICATIONS:  Current Outpatient Medications   Medication Sig Dispense Refill     polyethylene glycol (MIRALAX) packet Take 1 packet by mouth 2 times daily as needed for constipation       polyethylene glycol (MIRALAX) packet Take 1 packet by mouth 2 times daily Hold for loose bm       acetaminophen (TYLENOL) 500 MG tablet Take 500-1,000 mg by mouth every 6 hours as needed for mild pain       aspirin EC 81 MG EC tablet Take 1 tablet (81 mg) by mouth daily 30 tablet 0     atorvastatin (LIPITOR) 40 MG tablet Take 40 mg by mouth daily       bisacodyl (DULCOLAX) 10 MG suppository Place 1 suppository (10 mg) rectally daily as needed for constipation 4 suppository 11     blood glucose monitoring (NO BRAND SPECIFIED) meter device kit Use to test blood sugar bid times daily or as directed. 1 kit 0     DULoxetine (CYMBALTA) 30 MG capsule TAKE 1 CAPSULE(30 MG) BY MOUTH DAILY 90 capsule 2     insulin glargine (LANTUS PEN) 100 UNIT/ML pen Inject 10 Units Subcutaneous At Bedtime       ipratropium (ATROVENT) 0.03 % nasal spray INHALE 1 SPRAY IN EACH NOSTRIL EVERY 12 HOURS AS NEEDED 30 mL 11     lacosamide (VIMPAT) 50 MG TABS tablet Take 1 tablet (50 mg) by mouth 2 times daily       levETIRAcetam (KEPPRA) 750 MG tablet Take 1 tablet (750 mg) by mouth 2 times daily       MAGNESIUM-OXIDE 400 (241.3 Mg) MG tablet TAKE 1 TABLET BY MOUTH TWICE DAILY 180 tablet 1     metFORMIN (GLUCOPHAGE) 1000 MG tablet TAKE 1 TABLET BY MOUTH TWICE DAILY WITH MEALS 180 tablet 0     metoprolol succinate ER (TOPROL-XL) 25 MG 24 hr tablet Take 12.5 mg by mouth At Bedtime       nitroGLYcerin (NITROSTAT) 0.4 MG sublingual tablet For chest pain place 1 tablet under the tongue every 5 minutes for 3 doses. If symptoms persist 5 minutes after 1st dose call 911. 25 tablet 0     omeprazole (PRILOSEC) 40 MG DR capsule TAKE 1 CAPSULE(40 MG) BY  "MOUTH DAILY 30 TO 60 MINUTES BEFORE A MEAL 90 capsule 0     order for DME Equipment being ordered: wheeled walker with hand breaks and seat 1 each 0     order for DME Equipment being ordered: True Matrix Blood Glucose meter. 1 Act 11     polyethylene glycol-propylene glycol (SYSTANE ULTRA) 0.4-0.3 % SOLN ophthalmic solution Place 2 drops into both eyes daily as needed for dry eyes       sennosides (SENOKOT) 8.6 MG tablet Take 2 tablets by mouth daily       tamsulosin (FLOMAX) 0.4 MG capsule TAKE 1 CAPSULE BY MOUTH DAILY 90 capsule 3     ticagrelor (BRILINTA) 90 MG tablet Take 1 tablet (90 mg) by mouth 2 times daily 180 tablet 1     TRUE METRIX BLOOD GLUCOSE TEST test strip USE TO TEST THREE TIMES DAILY OR AS DIRECTED 300 strip 0       MEDICATION CHANGES/RATIONALE:    see discharge orders  /67   Pulse 75   Temp 97  F (36.1  C)   Resp 18   Ht 1.651 m (5' 5\")   Wt 68.4 kg (150 lb 12.8 oz)   SpO2 98%   BMI 25.09 kg/m      TODAY DURING EXAM/ROS:  No CP, SOB, Cough, dizziness, fevers, chills, HA, N/V, dysuria or Bowel Abnormalities. Appetite is good.  No pains      PHYSICAL EXAM Today is virtual from door way due to COVID19:  A & O x 3, NAD.Non labored. resp pattern and rate-- No focal neurological deficits except slightly slurred speech.. FERNANDEZ.  .       SNF LABS:  Recent Labs   Lab Test 03/06/20  0014 03/05/20  1411    139   POTASSIUM 4.5 4.4   CHLORIDE 106 107   CO2 26 25   ANIONGAP 5 7   * 228*   BUN 22 20   CR 0.87 1.08   ALLISON 9.1 9.6     CBC RESULTS:   Recent Labs   Lab Test 03/06/20  0051   WBC 9.9   RBC 4.36*   HGB 12.1*   HCT 38.3*   MCV 88   MCH 27.8   MCHC 31.6   RDW 14.3                DISCHARGE PLAN:  Occupational Therapy, Physical Therapy, Home Health Aide and From:  [facility] Follow-up with PCP in 7 days: 7 days.    Current [place] or other scheduled appointments:  Future Appointments   Date Time Provider Department Center   6/10/2020  1:30 PM Matt Morrissey MD " [facility]         MTM referral needed and placed by this provider: none    Pending labs: none     Discharge Treatments:none        TOTAL DISCHARGE TIME:   Greater than 30 minutes    ASPEN Montes CNP

## 2020-03-24 NOTE — PATIENT INSTRUCTIONS
[facility] Discharge Orders    Name: Dustin Pearce  : 1928  Planned Discharge Date: 3/27/20  Discharged to: with family to home    MEDICAL FOLLOW UP  Follow up with PCP in 1-2 weeks --family to call for appt as may need a virtual or actual visit before below noted appt  Follow up with Specialists--neurology--family to call for appt.  Next 5 appointments (look out 90 days)    Gideon 10, 2020  1:30 PM CDT  Office Visit with Matt Morrissey MD  [facility] [address] [id number] [phone]          FUTURE LABS: No labs orders/due    ORDER CHANGES:  ssi dc'd    DISCHARGE MEDICATIONS:  The patient s pharmacy is authorized to dispense a 30-day supply of medications. Refill requests should be directed to the primary provider, Matt Morrissey .   No narcotics are prescribed at time of discharge.   Current Outpatient Medications   Medication Sig Dispense Refill     polyethylene glycol (MIRALAX) packet Take 1 packet by mouth 2 times daily as needed for constipation       polyethylene glycol (MIRALAX) packet Take 1 packet by mouth 2 times daily Hold for loose bm       acetaminophen (TYLENOL) 500 MG tablet Take 500-1,000 mg by mouth every 6 hours as needed for mild pain       aspirin EC 81 MG EC tablet Take 1 tablet (81 mg) by mouth daily 30 tablet 0     atorvastatin (LIPITOR) 40 MG tablet Take 40 mg by mouth daily       bisacodyl (DULCOLAX) 10 MG suppository Place 1 suppository (10 mg) rectally daily as needed for constipation 4 suppository 11     blood glucose monitoring (NO BRAND SPECIFIED) meter device kit Use to test blood sugar bid times daily or as directed. 1 kit 0     DULoxetine (CYMBALTA) 30 MG capsule TAKE 1 CAPSULE(30 MG) BY MOUTH DAILY 90 capsule 2     insulin glargine (LANTUS PEN) 100 UNIT/ML pen Inject 10 Units Subcutaneous At Bedtime       ipratropium (ATROVENT) 0.03 % nasal spray INHALE 1 SPRAY IN EACH NOSTRIL EVERY 12 HOURS AS  NEEDED 30 mL 11     lacosamide (VIMPAT) 50 MG TABS tablet Take 1 tablet (50 mg) by mouth 2 times daily       levETIRAcetam (KEPPRA) 750 MG tablet Take 1 tablet (750 mg) by mouth 2 times daily       MAGNESIUM-OXIDE 400 (241.3 Mg) MG tablet TAKE 1 TABLET BY MOUTH TWICE DAILY 180 tablet 1     metFORMIN (GLUCOPHAGE) 1000 MG tablet TAKE 1 TABLET BY MOUTH TWICE DAILY WITH MEALS 180 tablet 0     metoprolol succinate ER (TOPROL-XL) 25 MG 24 hr tablet Take 12.5 mg by mouth At Bedtime       nitroGLYcerin (NITROSTAT) 0.4 MG sublingual tablet For chest pain place 1 tablet under the tongue every 5 minutes for 3 doses. If symptoms persist 5 minutes after 1st dose call 911. 25 tablet 0     omeprazole (PRILOSEC) 40 MG DR capsule TAKE 1 CAPSULE(40 MG) BY MOUTH DAILY 30 TO 60 MINUTES BEFORE A MEAL 90 capsule 0     order for DME Equipment being ordered: wheeled walker with hand breaks and seat 1 each 0     order for DME Equipment being ordered: True Matrix Blood Glucose meter. 1 Act 11     polyethylene glycol-propylene glycol (SYSTANE ULTRA) 0.4-0.3 % SOLN ophthalmic solution Place 2 drops into both eyes daily as needed for dry eyes       sennosides (SENOKOT) 8.6 MG tablet Take 2 tablets by mouth daily       tamsulosin (FLOMAX) 0.4 MG capsule TAKE 1 CAPSULE BY MOUTH DAILY 90 capsule 3     ticagrelor (BRILINTA) 90 MG tablet Take 1 tablet (90 mg) by mouth 2 times daily 180 tablet 1     TRUE METRIX BLOOD GLUCOSE TEST test strip USE TO TEST THREE TIMES DAILY OR AS DIRECTED 300 strip 0       SERVICES:  Home Care:  Occupational Therapy, Physical Therapy, Home Health Aide and From:  [facility]    ADDITIONAL INSTRUCTIONS:  None    ASPEN Montes CNP  This document was electronically signed on March 23, 2020

## 2020-03-28 NOTE — TELEPHONE ENCOUNTER
[facility] now requests orders and shares plan of care/discharge summaries for some patients through [facility] [employer].  Please REPLY TO THIS MESSAGE OR ROUTE BACK TO THE AUTHOR in order to give authorization for orders when needed.  This is considered a verbal order, you will still receive a faxed copy of orders for signature.  Thank you for your assistance in improving collaboration for our patients.      MD SUMMARY/PLAN OF CARE  PT SOC visit completed on 3/28/2020,     Pt and SO declines needs for additional homecare visits at this time. Pt and SO demonstrate safe mobility in the home and reports pt is near baseline. Education provided for reducing fall risks in the home and HEP training. No additional home care visits scheduled at this time.    MEDICATION REVIEW,  Upon entering medications into home care documentation the following interactions and duplications were noted. Please call pt and advice if changes are needing to be made    Interaction/Duplication noted between   aspirin 81 mg tablet, 1 tab Every day  And   ticagrelor 90 mg tablet, take 1 tablet (90 MG) by oral route 2 times per day    Duplications between  bisacodyl 10 mg rectal suppository  insert 1 suppository (10 MG) by rectal route Every day, PRN  and  sennosides 8.6 mg tablet  take 2 tablets by oral route Every day

## 2020-03-30 NOTE — LETTER
[place] CARE COORDINATION  [address]  [place] [address]  March 31, 2020    Dustin Pearce  [address]  [place] [address]      Dear Dustin,    I am a clinic care coordinator who works with Matt Morrissey MD at [facility]. I wanted to thank Halina for spending the time to talk with me.  Below is a description of clinic care coordination and how I can further assist you.      The clinic care coordinator team is made up of a registered nurse,  and community health worker who understand the health care system. The goal of clinic care coordination is to help you manage your health and improve access to the health care system in the most efficient manner. The team can assist you in meeting your health care goals by providing education, coordinating services, strengthening the communication among your providers  and supporting you with any resource needs.    Please feel free to contact me, at [phone] with any questions or concerns. We are focused on providing you with the highest-quality healthcare experience possible and that all starts with you.     Sincerely,     [facility]     Farnaz Eddy RN Care Coordinator   [facility] / [facility]   Phone: [phone]  Email :  [email or web address]      Enclosed: I have enclosed a copy of the Complex Care Plan. This has helpful information and goals that we have talked about. Please keep this in an easy to access place to use as needed.

## 2020-03-30 NOTE — PROGRESS NOTES
Clinic Care Coordination Contact  [facility]/Voicemail    Referral Source: IP Report    2/23/2020 discharged from Nursing Facility: [facility] stay 3/5/20 to 3/9/20.      Condition on Discharge:  Improving.     Function:  Ambulates: 185 ft w/ fww, Transfers: s  Cognitive Scores: SLUMS 3./30  --has been 18-19/30 last visits. Difficult to assess this stay.  Physical Function: Tinetti Balance Assessment: 22/40  and TUG 27  Equipment: walker  DME: Walker     DISCHARGE DIAGNOSIS:      Seizure disorder (H)  Dysphagia, unspecified type  Type 2 diabetes mellitus with hyperglycemia, with long-term current use of insulin (H)  Coronary artery disease involving native coronary artery of native heart without angina pectoris  Essential hypertension  Dyslipidemia  Chronic systolic heart failure (H)  Aortic valve stenosis, etiology of cardiac valve disease unspecified  Mitral valve insufficiency, unspecified etiology  PAD (peripheral artery disease) (H)  History of CVA (cerebrovascular accident)  Benign prostatic hyperplasia without lower urinary tract symptoms  Gastroesophageal reflux diseasewithout esophagitis  Depression, unspecified depression type  Chronic anemia  Slow transit constipation  Physical deconditioning      Patient has home care services        Clinical Data: Care Coordinator Outreach  Outreach attempted x 1.  Left message on patient's voicemail with call back information and requested return call.  Plan: . Care Coordinator will try to reach patient again in 1-2 business days.  [facility]point Medical Centershweta Eddy RN Care Coordinator   [facility] / [facility]   Phone: [phone]  Email :  [email or web address]

## 2020-03-30 NOTE — LETTER
[facility]  Complex Care Plan  About Me:    Patient Name:  Dustin Pearce    YOB: 1928  Age:         89 year old   [place] MRN:    [id number] Telephone Information:  Home Phone [phone]   Mobile [phone]       Address:  [address]  [facility] [facility] [address] Email address:  No e-mail address on record      Emergency Contact(s)    Name Relationship Lgl Grd Work Phone Home Phone Mobile Phone   1. DOLORES SMYTH Significant ot* No [phone]  none   2. NONE, PER PT Declined               Primary language:  English     needed? No   [facility]:  [phone] op. 1  Other communication barriers: None  Preferred Method of Communication:  Mail  Current living arrangement: I live in a private home  Mobility Status/ Medical Equipment: Independent w/Device    Health Maintenance  Health Maintenance Reviewed: Not assessed    My Access Plan  Medical Emergency 911   Primary Clinic Line [facility] - [phone]   24 Hour Appointment Line [phone] or  [phone]) (toll-free)   24 Hour Nurse Line [phone] (toll-free)   Preferred Urgent Care [facility], [phone]   Preferred Hospital [facility]  [phone]   [id number] [place] [facility] [address] AT [facility] [address]     Behavioral Health Crisis Line The National Suicide Prevention Lifeline at 1-728.931.1389 or 911             My Care Team Members  Patient Care Team       Relationship Specialty Notifications Start End    Matt Morrissey MD PCP - General Internal Medicine  12/18/16     Phone: [phone] Pager: [phone] Fax: [phone] [address] [place] [address]    Matt Morrissey MD PCP - Internal Medicine Internal Medicine  11/4/16     Phone: [phone] Pager: [phone] Fax: [phone] [address] [place] [address]     Hospice, [facility] ([facility] ([place]  6/9/18     Fax: [phone]          [facility] [address] [address]    Care, [facility]  HOME HEALTH AGENCY ([facility] ([place])  6/17/18     Phone: [phone]         Matt Morrissey MD Assigned PCP   6/17/18     Phone: [phone] Pager: [phone] Fax: [phone] [address] [place] [address]    Farnaz Eddy, RN Clinic Care Coordinator Primary Care - CC Admissions 3/10/20     Phone: [phone]                 My Care Plans  Self Management and Treatment Plan  Goals and (Comments)  Goals        General    Functional (pt-stated)     Notes - Note created  3/31/2020  9:07 AM by Farnaz Eddy, RN    Goal Statement: I will do my daily PT exercises to keep up my strength  Date Goal set: 3/31/2020  Barriers: None identified  Strengths: Halina is very supportive  Date to Achieve By: 6/1/2020  Patient expressed understanding of goal: *Halina expresses good understanding  Action steps to achieve this goal:  1. I will use my cane or walker for safety  2. I will sit down if I get dizzy  3. I will drink plenty of water to prevent dehydration(recommended 6-8 glasses a day    As of today's date 3/31/2020 goal is met at 0 - 25%.   Goal Status:  Active              Action Plans on File: None                      Advance Care Plans/Directives Type:   Type Advanced Care Plans/Directives: POLST(NA)    My Medical and Care Information  Problem List   Patient Active Problem List   Diagnosis     Coronary artery disease of native artery of native heart with stable angina pectoris (H)     Hyperlipidemia LDL goal <70     Benign non-nodular prostatic hyperplasia with lower urinary tract symptoms     Gastroesophageal reflux disease without esophagitis     Cerebrovascular accident (H)     Carotid artery stenosis     Abdominal aortic aneurysm (H)     Lumbar back pain     Benign essential hypertension     TIA (transient ischemic attack)      Ischemic cardiomyopathy     Aortic root dilatation (H)     Physical deconditioning     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)     Anemia due to blood loss, acute     Diarrhea, unspecified type     Nausea and vomiting, intractability of vomiting not specified, unspecified vomiting type     Acute pain of left shoulder     Balance problems     Generalized muscle weakness     Peripheral artery disease (H)     Aortic stenosis     RBBB with left anterior fascicular block     Stroke of unknown etiology (H)     Carotid stenosis     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)     History of TIA (transient ischemic attack) and stroke     UTI (urinary tract infection)     UTI (urinary tract infection) due to Enterococcus     Generalized weakness     Type 2 diabetes mellitus with complication, with long-term current use of insulin (H)     Atherosclerosis of coronary artery of native heart without angina pectoris, unspecified vessel or lesion type     Depression, unspecified depression type     Slow transit constipation     Severe sepsis (H)     Chest discomfort     NSTEMI (non-ST elevated myocardial infarction) (H)     Anemia     Cognitive impairment     Benign prostatic hyperplasia without lower urinary tract symptoms     Other seizures (H)     Expressive aphasia     Spells of speech arrest     Facial droop     Dysarthria     Spell of altered cognition     H/O TIA (transient ischemic attack) and stroke     Seizure disorder (H)     Occlusion of left internal carotid artery     History of CEA (carotid endarterectomy), Right     Coronary artery disease involving native heart without angina pectoris, unspecified vessel or lesion type, h/o MODESTA     Essential hypertension     Debility     Spell of change in speech     Seizure (H)     Altered mental status      Current Medications and Allergies:  See printed Medication Report.    Care Coordination Start Date: 3/31/2020   Frequency of Care  Coordination: 2 weeks   Form Last Updated: 03/31/2020

## 2020-03-31 NOTE — PROGRESS NOTES
Clinic Care Coordination Contact    Clinic Care Coordination Contact  OUTREACH    Referral Information:  Referral Source: IP Report    Primary Diagnosis: Neurological Disorders(seizure)    Chief Complaint   Patient presents with     Clinic Care Coordination - Post Hospital     Clinic Care Coordination RN        Universal Utilization:   2/23/2020 discharged from Nursing Facility: [facility] Hospital stay 3/5/20 to 3/9/20.      Condition on Discharge:  Improving.     Function:  Ambulates: 185 ft w/ fww, Transfers: s  Cognitive Scores: SLUMS 3./30  --has been 18-19/30 last visits. Difficult to assess this stay.  Physical Function: Tinetti Balance Assessment: 22/40  and TUG 27  Equipment: walker  DME: Walker     DISCHARGE DIAGNOSIS:      Seizure disorder (H)  Dysphagia, unspecified type  Type 2 diabetes mellitus with hyperglycemia, with long-term current use of insulin (H)  Coronary artery disease involving native coronary artery of native heart without angina pectoris  Essential hypertension  Dyslipidemia  Chronic systolic heart failure (H)  Aortic valve stenosis, etiology of cardiac valve disease unspecified  Mitral valve insufficiency, unspecified etiology  PAD (peripheral artery disease) (H)  History of CVA (cerebrovascular accident)  Benign prostatic hyperplasia without lower urinary tract symptoms  Gastroesophageal reflux disease without esophagitis  Depression, unspecified depression type  Chronic anemia  Slow transit constipation  Physical deconditioning       Clinic Utilization  Difficulty keeping appointments:: No  Compliance Concerns: No  No-Show Concerns: No  No PCP office visit in Past Year: No  Utilization    Last refreshed: 3/31/2020  9:03 AM:  Hospital Admissions 5           Last refreshed: 3/31/2020  9:03 AM:  ED Visits 7           Last refreshed: 3/31/2020  9:03 AM:  No Show Count (past year) 2              Current as of: 3/31/2020  9:03 AM               Clinical Concerns:  Current Medical patient is doing daily exercises and Halina is able to assist with his cares  No further TIA episode since the patient has been home : CC spoke to Halina/REVA and she reports the patient had a HC assessment and it was decided not to open patient due to the patient is keeping up his strength by daily exercises and Halina is able to assist with his cares   Current Behavioral Concerns: Not discussed     Education Provided to patient: Updated care plan mailed today   Pain  Pain (GOAL):: No  Health Maintenance Reviewed: Not assessed  Clinical Pathway: None    Medication Management:  Reviewed by home care RN    Functional Status:  Dependent ADLs:: Ambulation-cane, Ambulation-walker  Dependent IADLs:: Cleaning, Cooking, Laundry, Shopping, Medication Management, Transportation  Bed or wheelchair confined:: No  Mobility Status: Independent w/Device  Fallen 2 or more times in the past year?: Yes  Any fall with injury in the past year?: Yes    Living Situation:  Current living arrangement:: I live in a private home    Lifestyle & Psychosocial Needs:  Lifestyle     Physical activity     Days per week: 0 days     Minutes per session: 0 min     Stress: Not at all     Social Needs     Financial resource strain: Not on file     Food insecurity     Worry: Never true     Inability: Never true     Transportation needs     Medical: No     Non-medical: No     Diet:: Diabetic diet  Inadequate nutrition (GOAL):: No  Tube Feeding: No  Inadequate activity/exercise (GOAL):: Yes  Significant changes in sleep pattern (GOAL): No  Transportation means:: Family     Taoism or spiritual beliefs that impact treatment:: No  Mental health DX:: No  Mental health management concern (GOAL):: No  Informal Support system:: Significant other   Socioeconomic History     Marital status:      Spouse name: Not on file     Number of children: Not on file     Years of education: Not on file     Highest education  level: Not on file     Tobacco Use     Smoking status: Former Smoker     Packs/day: 1.00     Years: 30.00     Pack years: 30.00     Types: Cigarettes     Start date:      Last attempt to quit: 1999     Years since quittin.2     Smokeless tobacco: Never Used   Substance and Sexual Activity     Alcohol use: Not Currently     Alcohol/week: 7.0 standard drinks     Types: 7 Standard drinks or equivalent per week     Comment: 1 drink per biweekly     Drug use: No     Sexual activity: Not Currently     Partners: Female               Resources and Interventions:  Current Resources:   List of home care services:: Skilled Nursing, Home Health Aid, Physicial Therapy  Community Resources: None  Supplies used at home:: None, Enteral Nutrition & Supplies  Equipment Currently Used at Home: cane, straight, shower chair, grab bar, tub/shower, walker, rolling    Advance Care Plan/Directive  Advanced Care Plans/Directives on file:: No  Type Advanced Care Plans/Directives: POLST(NA)  Advanced Care Plan/Directive Status: Not Applicable    Referrals Placed: None     Goals:   Goals        General    Functional (pt-stated)     Notes - Note created  3/31/2020  9:07 AM by Farnaz Eddy RN    Goal Statement: I will do my daily PT exercises to keep up my strength  Date Goal set: 3/31/2020  Barriers: None identified  Strengths: Halina is very supportive  Date to Achieve By: 2020  Patient expressed understanding of goal: *Halina expresses good understanding  Action steps to achieve this goal:  1. I will use my cane or walker for safety  2. I will sit down if I get dizzy  3. I will drink plenty of water to prevent dehydration(recommended 6-8 glasses a day    As of today's date 3/31/2020 goal is met at 0 - 25%.   Goal Status:  Active             Patient/Caregiver understanding: Halina expresses understanding     Outreach Frequency: 2 weeks  Future Appointments              In 2 months Matt Morrissey MD [facility]           Plan:   Patient will continue daily PT exercises and uses cane or walker for safety  Halina will encourage patient to drink plenty of water to prevent dehydration  Patient will sit down if feeling dizzy  CC will follow up in 3-5 business days     [facility]     Farnaz Eddy RN Care Coordinator   [facility] / [facility]   Phone: [phone]  Email :  [email or web address]

## 2020-03-31 NOTE — PATIENT INSTRUCTIONS

## 2020-04-06 NOTE — TELEPHONE ENCOUNTER
Prescription approved per [facility] [facility].  Kirti Wade RN

## 2020-04-06 NOTE — TELEPHONE ENCOUNTER
"omeprazole (PRILOSEC) 40 MG DR capsule  90 capsule  0  1/7/2020      Last Written Prescription Date:  1/7/2020  Last Fill Quantity: 90,  # refills: 0   Last office visit: 3/5/2020 with prescribing provider: PAULINE Morrissey    Future Office Visit:   Next 5 appointments (look out 90 days)    Gideon 10, 2020  1:30 PM CDT  [facility] [address] [facility] [address] [phone]         Requested Prescriptions   Pending Prescriptions Disp Refills     omeprazole (PRILOSEC) 40 MG DR capsule [Pharmacy Med Name: OMEPRAZOLE 40MG CAPSULES] 90 capsule 0     Sig: TAKE 1 CAPSULE(40 MG) BY MOUTH DAILY 30 TO 60 MINUTES BEFORE A MEAL       PPI Protocol Passed - 4/4/2020  3:12 AM        Passed - Not on Clopidogrel (unless Pantoprazole ordered)        Passed - No diagnosis of osteoporosis on record        Passed - Recent (12 mo) or future (30 days) visit within the authorizing provider's specialty     Patient has had an office visit with the authorizing provider or a provider within the authorizing providers department within the previous 12 mos or has a future within next 30 days. See \"Patient Info\" tab in inbasket, or \"Choose Columns\" in Meds & Orders section of the refill encounter.              Passed - Medication is active on med list        Passed - Patient is age 18 or older             "

## 2020-04-07 NOTE — PROGRESS NOTES
Clinic Care Coordination Contact  Follow Up Progress Note   2/23/2020 discharged from Nursing Facility: [facility] Hospital stay 3/5/20 to 3/9/20.      Condition on Discharge:  Improving.     Function:  Ambulates: 185 ft w/ fww, Transfers: s  Cognitive Scores: SLUMS 3./30  --has been 18-19/30 last visits. Difficult to assess this stay.  Physical Function: Tinetti Balance Assessment: 22/40  and TUG 27  Equipment: walker  DME: Walker     DISCHARGE DIAGNOSIS:      Seizure disorder (H)  Dysphagia, unspecified type  Type 2 diabetes mellitus with hyperglycemia, with long-term current use of insulin (H)  Coronary artery disease involving native coronary artery of native heart without angina pectoris  Essential hypertension  Dyslipidemia  Chronic systolic heart failure (H)  Aortic valve stenosis, etiology of cardiac valve disease unspecified  Mitral valve insufficiency, unspecified etiology  PAD (peripheral artery disease) (H)  History of CVA (cerebrovascular accident)  Benign prostatic hyperplasia without lower urinary tract symptoms  Gastroesophageal reflux disease without esophagitis  Depression, unspecified depression type  Chronic anemia  Slow transit constipation  Physical deconditioning          Assessment: CC spoke to Halina wife and she reports the patient has had 2 small dizzy spells.  Patient has to be reminded to standing for a few minutes when he gets up from a lying or sittling position    Goals addressed this encounter:   Goals Addressed                 This Visit's Progress      Functional (pt-stated)   On track     Goal Statement: I will do my daily PT exercises to keep up my strength  Date Goal set: 3/31/2020  Barriers: None identified  Strengths: Halina is very supportive  Date to Achieve By: 7/1/2020  Patient expressed understanding of goal: *Halina expresses good understanding  Action steps to achieve this goal:  1. I will use my cane or walker for  safety  2. I will sit down if I get dizzy  3. I will drink plenty of water to prevent dehydration(recommended 6-8 glasses a day  As of today's date 4/7/2020 goal is met at 26 - 50%.   Goal Status:  Showing progress              Intervention/Education provided during outreach: Reviewed care plan          Plan:   Patient will stand for a few minutes when getting up form a lying or sitting position to prevent a fall  Patient will drink plenty of water (recommended 6-8 glasses of water a day) to prevent dehydration  Halina will call CC if she needs additional support for the care of the patient in the future   [facility]     Farnaz Eddy RN Care Coordinator   [facility] / [facility]   Phone: [phone]  Email :  [email or web address]

## 2020-04-18 NOTE — LETTER
This letter is to give you information only. No action is required on your part.                               Beneficiary Notification Letter - BPCI Advanced                     Your Doctor or Hospital Has Joined Medicare s New Payment and                                                          Service Delivery Model     Hello,     We wanted to let you know that your health care provider, [facility], has volunteered to take part in our Centers for Medicare & Medicaid Services (CMS) Bundled Payments for Care Improvement Advanced Model (BPCI Advanced). This doesn t change your Medicare rights or benefits and you don t need to do anything.      What are bundled payments?   A bundled payment combines, or bundles together, payments that Medicare makes to your health care providers for the many different kinds of medical services you might get in a specific time period. In BPCI Advanced, this time period could include a hospital inpatient stay or outpatient procedure, plus 90 days.     Why would Medicare bundle payments?   Bundled payments are thought of as a  value-based  way to pay because health care providers are responsible for both the quality and cost of medical care they give. This is a relatively new way of paying health care providers compared to the fee-for-service  way Medicare has traditionally paid, where providers are paid separately for each service they provide. Bundled payments encourage these providers to work together to provide better, more coordinated care during your hospital stay, or outpatient procedure, and through your recovery.     What does BPCI Advanced mean for me?   You re more likely to get even better care when hospitals, doctors, and other health care providers work together. In BPCI Advanced, hospitals, doctors, and other health care providers may be rewarded for providing better, more coordinated health care. Medicare will watch BPCI Advanced participants  closely to make sure that you and other patients keep getting efficient, high quality care.     What do I need to know about BPCI Advanced?   What s most important for you to know is that your Medicare rights and benefits don t change because your health care provider is participating in BPCI Advanced. Medicare will keep covering all of your medically necessary services.       January 2019    Beneficiary Notification Letter - BPCI Advanced (page 2)     Even though Medicare will pay your doctor in a different way under BPCI Advanced, how much you have to pay won t change. Health care providers and suppliers who are enrolled in Medicare will submit their Medicare claims like they always have.     You ll have all the same Medicare rights and protections, including the right to choose which hospital, doctor, or other health care provider you see. If you don t want to get care from a health care provider who s participating in BPCI Advanced, then you ll have to choose a different health care provider who s not participating in the Model.     How can I give feedback about my health care?   Medicare might ask you to take a voluntary survey about the services and care you received from [facility] during your hospital stay or outpatient procedure and for a specific period of time afterwards. You can decide whether you want to take the voluntary survey, but if you do, it ll help Medicare make BPCI Advanced and the care of other Medicare patients better.     If you have concerns or complaints about your care, you can:     Talk to your doctor or health care provider.     Contact your Beneficiary and Family Centered Care Quality Improvement Organization ([facility]-QIO). You can get your [facility]-QIO s phone number at Medicare.gov/contacts or by calling 1-800-MEDICARE. [employer] users can call 1-552.448.7180.     Where can I learn more about BPCI Advanced?   Learn more about BPCI Advanced at  https://innovation.cms.gov/initiatives/bpci-advanced/:     A list of all the hospitals and physician group practices in the country participating in BPCI Advanced.     All of the inpatient and outpatient Clinical Episodes that are currently included under BPCI Advanced. A Clinical Episode is a grouping of medical conditions or diagnoses that are included in the BPCI Advanced Model.                                   January 2019

## 2020-04-19 PROBLEM — I63.9 STROKE (H): Status: ACTIVE | Noted: 2020-01-01

## 2020-04-19 NOTE — PLAN OF CARE
Discharge Planner SLP   Patient plan for discharge: home  Current status: Clinical swallow evaluation completed. Pt NPO, failed RN dysphagia screen d/t aphasia/dysarthria. RN approved session/PO trials. Oromotor without any focal neurological deficits - mild overall weakness. Minimal-mild dysarthria and significant word finding difficulties in informal conversation. Evaluation completed with thin liquids, puree solids, semi-solids and regular solids. He currently presents with mild oropharyngeal dysphagia most notable for slow oral phase/oral transit, suspect reduced control/premature bolus spillage only with thin via straw resulted in wet vocal quality and slow hyolaryngeal ROM to palpation. Good tolerance of softer/moister solids and thin via cup.    Recommendations: initiate dysphagia diet level 2, thin liquids (no straws) when fully upright.     Barriers to return to prior living situation: aphasia  Recommendations for discharge: TCU  Rationale for recommendations: ongoing SLP for dysphagia intervention and full speech/language evaluation during IP vs next level of care pending discharge timing/destination.        Entered by: Mikaela Heath 04/19/2020 12:08 PM

## 2020-04-19 NOTE — PROGRESS NOTES
04/19/20 1600   Quick Adds   Type of Visit Initial PT Evaluation   Living Environment   Lives With significant other   Living Arrangements house   Home Accessibility stairs to enter home;stairs within home   Number of Stairs, Main Entrance 2   Stair Railings, Main Entrance railing on left side (ascending)   Number of Stairs, Within Home, Primary 7   Transportation Anticipated car, drives self   Self-Care   Usual Activity Tolerance moderate   Current Activity Tolerance moderate   Regular Exercise Yes   Activity/Exercise Type strength training;walking   Exercise Amount/Frequency daily   Equipment Currently Used at Home cane, straight;tub bench;walker, rolling   Activity/Exercise/Self-Care Comment SO assists with dressing and bathing   Functional Level Prior   Ambulation 1-->assistive equipment   Transferring 1-->assistive equipment   Toileting 1-->assistive equipment   Bathing 3-->assistive equipment and person   Communication 0-->understands/communicates without difficulty   Cognition 1 - attention or memory deficits   Fall history within last six months yes   Number of times patient has fallen within last six months 3   Prior Functional Level Comment pt has macular degeneration and legally blind. SO confirmed that pt uses cane in townhouse and walker outside of town house. She assist with dressing and bathing and cues him to sit when urinating and he had recent fall because stood up.   General Information   Onset of Illness/Injury or Date of Surgery - Date 04/19/20   Referring Physician Dr. Mayfield   Patient/Family Goals Statement Pt wants to go home   Pertinent History of Current Problem (include personal factors and/or comorbidities that impact the POC) Pt is a 89 year old male admitted for speech changes, left UE weakness, seizure activity. Pt has hx previous CVA, cardiomyopathy, macular degeneration   Precautions/Limitations fall precautions;seizure precautions;other (see comments)  (legally blind)   General  "Observations BP rest 122/52   General Info Comments Pt is legally blind   Cognitive Status Examination   Orientation person;place   Level of Consciousness alert   Follows Commands and Answers Questions 100% of the time   Personal Safety and Judgment intact   Memory impaired   Cognitive Comment   ([place])   Pain Assessment   Patient Currently in Pain No   Range of Motion (ROM)   ROM Comment WFL   Strength   Strength Comments WFL   Bed Mobility   Bed Mobility Comments sit to and from supine SBA   Transfer Skills   Transfer Comments sit to stand;  cues and SBA and uses FWW for support   Gait   Gait Comments Pt ambulates with FWW with verbal cues for directions due to vision impairment. CGA/SBA for balance. BP after gait 115/46 RN informed   Sensory Examination   Sensory Perception Comments decreased sensation left UE since admission   Coordination   Coordination no deficits were identified   General Therapy Interventions   Planned Therapy Interventions balance training;gait training;strengthening;home program guidelines;progressive activity/exercise   Clinical Impression   Criteria for Skilled Therapeutic Intervention yes, treatment indicated   PT Diagnosis Decreased balance   Influenced by the following impairments Decreased endurance and balance   Functional limitations due to impairments increased support and cues with gait   Clinical Presentation Stable/Uncomplicated   Clinical Presentation Rationale clinical judgement   Clinical Decision Making (Complexity) Low complexity   Therapy Frequency 5x/week   Predicted Duration of Therapy Intervention (days/wks) 1-2 days   Anticipated Discharge Disposition Home with Assist   Risk & Benefits of therapy have been explained Yes   Patient, Family & other staff in agreement with plan of care Yes   [facility] AM-PAC TM \"6 Clicks\"   2016, Trustees of [facility], under license to [employer].  All rights reserved.   6 Clicks Short Forms Basic Mobility Inpatient Short " "Form   [facility] AM-PAC  \"6 Clicks\" V.2 Basic Mobility Inpatient Short Form   1. Turning from your back to your side while in a flat bed without using bedrails? 4 - None   2. Moving from lying on your back to sitting on the side of a flat bed without using bedrails? 4 - None   3. Moving to and from a bed to a chair (including a wheelchair)? 3 - A Little   4. Standing up from a chair using your arms (e.g., wheelchair, or bedside chair)? 3 - A Little   5. To walk in hospital room? 3 - A Little   6. Climbing 3-5 steps with a railing? 3 - A Little   Basic Mobility Raw Score (Score out of 24.Lower scores equate to lower levels of function) 20   Total Evaluation Time   Total Evaluation Time (Minutes) 20     "

## 2020-04-19 NOTE — H&P
Admitted:     04/18/2020      MEDICINE ADMISSION      PRIMARY CARE PHYSICIAN:  Matt Morrissey MD.      CODE STATUS:  Full code.      CHIEF COMPLAINT:  Difficulty speaking and possible seizure-like activity.      HISTORY OF PRESENT ILLNESS:  Mr. Pearce is a 89-year-old male with a past medical history significant for previous stroke, possible seizure disorder, ischemic cardiomyopathy, diabetes, who presents to the Emergency Department with the above concern.  History is obtained through discussion with the ED physician and discussion with the patient, though the patient is a poor historian.  Per report, around 4:00 p.m. the patient could not move or walk, which eventually resolved after an undetermined amount of time, but then he had difficulty speaking with slurred speech.  The patient has had similar presentations in 08/2019 and just a month ago in 03/2020 where there is the thought of a possible seizure disorder.  He is on multiple antiepileptics and these medications have been adjusted recently given his ongoing symptoms.  The patient's symptoms, in terms of his speech and movement, are essentially back to baseline at this point.  Of note, he has had extensive workup multiple times in the past for this.  He is a bit confused at this point, however, and it is an unclear if this is because of the later hour or because of this spell that he had.  I will note that he had a head CT without any acute abnormalities or changes from prior.  No reported infectious symptoms, specifically no fevers, chills, cough, shortness of breath, abdominal pain or nausea.  He actually feels well and normal at this point in my discussion with him.  Laboratory workup is unremarkable.  The case was discussed with Neurology by the ED attending with plans for observation tonight and they will determine tomorrow if further imaging or workup may be warranted.      PAST MEDICAL HISTORY:   1.  Ventricular tachycardia.   2.  Previous stroke.   3.   Paroxysmal atrial fibrillation.   4.  Peripheral arterial disease.   5.  Osteopenia.   6.  Coronary artery disease.   7.  Kidney stones.   8.  Ischemic cardiomyopathy with systolic congestive heart failure, most recent EF of 30-35% with grade I diastolic dysfunction.   9.  GERD.   10.  Dyslipidemia.   11.  Diabetes type 2 with neuropathy.   12.  Depression.   13.  Carotid artery stenosis.   14.  Hypertension.   15.  Aortic stenosis.   16.  Abdominal aortic aneurysm.      MEDICATIONS:    Medications Prior to Admission   Medication Sig Dispense Refill Last Dose     acetaminophen (TYLENOL) 500 MG tablet Take 500-1,000 mg by mouth every 6 hours as needed for mild pain   Past Month at Unknown time     aspirin EC 81 MG EC tablet Take 1 tablet (81 mg) by mouth daily 30 tablet 0 4/18/2020 at AM     atorvastatin (LIPITOR) 40 MG tablet Take 40 mg by mouth daily   4/18/2020 at AM     bisacodyl (DULCOLAX) 10 MG suppository Place 1 suppository (10 mg) rectally daily as needed for constipation 4 suppository 11      DULoxetine (CYMBALTA) 30 MG capsule TAKE 1 CAPSULE(30 MG) BY MOUTH DAILY 90 capsule 2 4/18/2020 at AM     insulin glargine (LANTUS PEN) 100 UNIT/ML pen Inject 26 Units Subcutaneous At Bedtime   4/17/2020 at hs     ipratropium (ATROVENT) 0.03 % nasal spray INHALE 1 SPRAY IN EACH NOSTRIL EVERY 12 HOURS AS NEEDED 30 mL 11 4/18/2020 at Unknown time     lacosamide (VIMPAT) 50 MG TABS tablet Take 1 tablet (50 mg) by mouth 2 times daily   4/18/2020 at AM     levETIRAcetam (KEPPRA) 750 MG tablet Take 1 tablet (750 mg) by mouth 2 times daily   4/18/2020 at AM     MAGNESIUM-OXIDE 400 (241.3 Mg) MG tablet TAKE 1 TABLET BY MOUTH TWICE DAILY 180 tablet 1 4/18/2020 at AM     metFORMIN (GLUCOPHAGE) 1000 MG tablet TAKE 1 TABLET BY MOUTH TWICE DAILY WITH MEALS 180 tablet 0 4/18/2020 at AM     metoprolol succinate ER (TOPROL-XL) 25 MG 24 hr tablet Take 12.5 mg by mouth At Bedtime   4/17/2020 at HS     nitroGLYcerin (NITROSTAT) 0.4 MG  sublingual tablet For chest pain place 1 tablet under the tongue every 5 minutes for 3 doses. If symptoms persist 5 minutes after 1st dose call 911. 25 tablet 0      omeprazole (PRILOSEC) 40 MG DR capsule TAKE 1 CAPSULE(40 MG) BY MOUTH DAILY 30 TO 60 MINUTES BEFORE A MEAL 90 capsule 0 4/18/2020 at AM     polyethylene glycol (MIRALAX) packet Take 1 packet by mouth 2 times daily as needed for constipation   4/17/2020     polyethylene glycol-propylene glycol (SYSTANE ULTRA) 0.4-0.3 % SOLN ophthalmic solution Place 2 drops into both eyes daily as needed for dry eyes   4/17/2020     sennosides (SENOKOT) 8.6 MG tablet Take 2 tablets by mouth daily (Patient taking differently: Take 2 tablets by mouth daily as needed )   Past Month at Unknown time     tamsulosin (FLOMAX) 0.4 MG capsule TAKE 1 CAPSULE BY MOUTH DAILY 90 capsule 3 4/17/2020     ticagrelor (BRILINTA) 90 MG tablet Take 1 tablet (90 mg) by mouth 2 times daily 180 tablet 1 4/18/2020 at AM     blood glucose monitoring (NO BRAND SPECIFIED) meter device kit Use to test blood sugar bid times daily or as directed. 1 kit 0      order for DME Equipment being ordered: wheeled walker with hand breaks and seat 1 each 0      order for DME Equipment being ordered: True Matrix Blood Glucose meter. 1 Act 11      TRUE METRIX BLOOD GLUCOSE TEST test strip USE TO TEST THREE TIMES DAILY OR AS DIRECTED 300 strip 0            SOCIAL HISTORY:  The patient is a former smoker, not currently drinking.      FAMILY HISTORY:  Father had heart failure.  Mother had breast cancer.      ALLERGIES:  OXYCODONE, SULFA AND TETRACYCLINE.      REVIEW OF SYSTEMS:  The complete review of systems reviewed and negative, save for the pertinent positives which are recorded in the HPI.      PHYSICAL EXAMINATION:   VITAL SIGNS:  Vitals show blood pressure of 122/62, heart rate 52, respirations 18, satting 95% on room air, temperature of 97.3 degrees Fahrenheit.   GENERAL:  The patient is lying in bed and  appears comfortable.   HEENT:  Pupils equal and reactive to light, extraocular muscle function intact.  No scleral icterus.  Oropharynx is clear.   NECK:  No lymphadenopathy or thyromegaly.   CARDIOVASCULAR:  Heart is regular rate and rhythm without any murmur, rub or gallop.   PULMONARY:  Lungs are clear to auscultation bilaterally without wheeze or rhonchi.   GASTROINTESTINAL:  Positive bowel sounds, soft, nontender and nondistended.   SKIN:  No rashes or lesions.   LYMPHATICS:  Trace edema of the bilateral lower extremities.   PSYCHIATRIC:  The patient is alert and oriented to self.  He does answer questions appropriately, but seems quite hard of hearing.   NEUROLOGIC:  Cranial nerves II-XII are grossly intact.  He does seem a bit weak but is symmetric and can  onto my fingers bilaterally with his hands.  He is able to move both lower extremities.  Nothing focal noted at this point.      LABORATORY DATA:  WBC count 8.1, hemoglobin 11.9, platelets of 228.  BMP unremarkable.     CT of the head as above.   EKG shows sinus bradycardia with some T-wave inversions in V4 through 6 and a right bundle branch block which does appear to be new from prior.      ASSESSMENT AND PLAN:  Mr. Pearce is a 89-year-old male with a past medical history significant for a stroke, paroxysmal atrial fibrillation, ischemic cardiomyopathy, diabetes, who presents to the Emergency Department with weakness and word finding difficulties.   1.  Weakness with word finding difficulties:  Again, he has had this multiple times in the past and does carry diagnoses of a seizure disorder as well as a previous stroke.  At this point I will plan to continue with his PTA regimen and have seizure precautions and await formal Neurology consultation in the morning to determine next steps given that he has had admissions for this in the past and extensive workup also in the past.   2.  EKG changes:  The patient is asymptomatic, denying any chest  discomfort.  I will trend troponins, monitor on telemetry and repeat an EKG in the morning.   3.  Diabetes:  We will continue with his PTA regimen of Lantus and metformin.   4.  Coronary artery disease, ischemic cardiomyopathy, dyslipidemia and hypertension:  We will continue with PTA antihypertensives as well as aspirin.  Statin can be resumed upon discharge.   5.  Seizure disorder:  We will continue with antiepileptics.   6.  Gastroesophageal reflux disease:  We will continue with PPI.   7.  Paroxysmal atrial fibrillation:  Continue with beta blocker and monitor on telemetry.      DISPOSITION:  Brought under observation status for Neurology consultation and further observation to determine if he returns back to normal.         PADMAJA PATRICIO DO             D: 2020   T: 2020   MT: RASHIDA      Name:     SID AGUIAR   MRN:      [id number]        Account:      [id number]   :      1928        Admitted:     2020                   Document: [id number]       cc: Matt Morrissey MD

## 2020-04-19 NOTE — PROGRESS NOTES
[facility]    Medicine Progress Note - Hospitalist Service       Date of Admission:  4/18/2020  Assessment & Plan      Weakness and word finding difficulties  R/o stroke and seizure  Known chronic left ICA occlusion  Patient had bilateral leg weakness and difficulty speaking 2 hours prior to presentation  Code stroke called in the ED  CT revealed no new infarcts: old left frontal nfarct and altered perfusion in the posterior left hemisphere, chronically occluded left ICA, and moderate stenosis in the left P2 segment  Patient has seizure history as well, recent admission on 3/2020 with recurrent facial droop, slurred speech, and weakness with negative stroke workup  Plan  - stroke neuro consulted  - permissive htn  - MRI of the brain ordered  - rectal aspirin  - PT/OT/SLP  - would repeat TTE given the EKG changes  - resume atorvastatin when taking orals.    CAD with ischemic CM  History of MODESTA > 1 year ago, but remains on DAPT  Follows with Dr. Samuel at [facility]  Last echo 3/2020 with 30-35% EF, moderate global hypokinesis with discrete regional WMAs.   No report of cardiopulmonary symptoms on admission or after  EKG on admission reveals SB with inferolateral TWI  Repeat the following AM reveals possible inferior STD  - holding home meds while NPO  - needs a medicine reconciliation compelted  - obtain an echocardiogram  - likely outpatient cardiac workup, as his EF is lower than previously but there are no acute symptoms or need.    DM2  8.3 A1c 1 month ago  - sliding scale npo insulin for now  - med rec needed    BPH  GERD  Anxiety and depression  - continue meds when med completed and tolerating orals        Diet: NPO for Medical/Clinical Reasons Except for: No Exceptions    DVT Prophylaxis: Pneumatic Compression Devices  Luis Catheter: not present  Code Status: Full Code      Disposition Plan   Expected discharge: 2 - 3 days, recommended to prior living arrangement once workup complete.  Entered:  Benoit Trevino DO 04/19/2020, 8:29 AM       The patient's care was discussed with the Patient.    Benoit Trevino DO  Hospitalist Service  [facility]    ______________________________________________________________________    Interval History   Needs to urinate. Denies any weakness. No other concerns, no chest pain or shortness of breath. He cannot tell me if he feels back to normal.    Data reviewed today: I reviewed all medications, new labs and imaging results over the last 24 hours. I personally reviewed no images or EKG's today.    Physical Exam   Vital Signs: Temp: 98.5  F (36.9  C) Temp src: Oral BP: 103/52 Pulse: 61 Heart Rate: 58 Resp: 16 SpO2: 92 % O2 Device: None (Room air)    Weight: 129 lbs 6.4 oz  GENERAL:  The patient is lying in bed and appears comfortable.   HEENT:  Pupils equal and reactive to light, extraocular muscle function intact.  No scleral icterus.  Oropharynx is clear.   CARDIOVASCULAR:  Heart is regular rate and rhythm without any murmur, rub or gallop.   PULMONARY:  Lungs are clear to auscultation bilaterally without wheeze or rhonchi.   GASTROINTESTINAL:  Positive bowel sounds, soft, nontender and nondistended.   PSYCHIATRIC:  The patient is alert and oriented to self.  He does answer questions appropriately, but seems quite hard of hearing.   NEUROLOGIC:  Cranial nerves II-XII are grossly intact.  No focal muscular weakness of the upper/lower extremities as strenght 5/5. No obvious facial droop. Speech is very clear    Data   Recent Labs   Lab 04/19/20  0720 04/19/20  0429 04/19/20  0002 04/18/20  1944   WBC  --   --   --  8.1   HGB  --   --   --  11.9*   MCV  --   --   --  90   PLT  --   --   --  228   INR  --   --   --  1.08   NA  --   --   --  138   POTASSIUM  --   --   --  4.3   CHLORIDE  --   --   --  102   CO2  --   --   --  28   BUN  --   --   --  17   CR  --   --   --  0.89   ANIONGAP  --   --   --  8   ALLISON  --   --   --  9.1   GLC  --   --   --   292*   TROPI 0.034 0.035 0.042  --      Recent Results (from the past 24 hour(s))   CT Head w/o Contrast    Narrative    CT SCAN OF THE HEAD WITHOUT CONTRAST   4/18/2020 7:35 PM     HISTORY: Code Stroke. Speech difficulties. Transit difficulty in  walking.    TECHNIQUE: Axial images of the head and coronal reformations without  IV contrast material. Radiation dose for this scan was reduced using  automated exposure control, adjustment of the mA and/or kV according  to patient size, or iterative reconstruction technique.    COMPARISON: 3/5/2020.    FINDINGS: Again seen is an old left frontal infarct. Moderate cerebral  atrophy and scattered white matter changes are also noted. There is no  evidence for intracranial hemorrhage, acute infarct, mass effect, or  skull fracture.      Impression    IMPRESSION: Chronic changes. No evidence for intracranial hemorrhage  or any acute process.    RACHELLE STANFORD MD   CTA Head Neck with Contrast    Narrative    CTA HEAD AND NECK WITH CONTRAST 4/18/2020 7:37 PM     HISTORY: Difficulty speaking and walking. Code stroke.    TECHNIQUE: Axial images were obtained through the head and neck with  intravenous contrast. 70 mL of Isovue-370 was given. Multiplanar  reconstructions were performed. 3-D reconstructions off of a remote  workstation for CT angiography were also acquired. Carotid stenoses  were evaluated by comparing the caliber of the proximal internal  carotid artery to the caliber of the distal internal carotid artery.  Radiation dose for this scan was reduced using automated exposure  control, adjustment of the mA and/or kV according to patient size, or  iterative reconstruction technique.    COMPARISON: 3/5/2020.    FINDINGS: There is heavy atherosclerotic calcification involving the  thoracic arch and proximal brachiocephalic vessels with approximately  60% stenosis of the proximal left subclavian artery.    Right carotid system: Mild to moderate calcific plaque is seen in  the  carotid bifurcation. There is no significant stenosis.    Left carotid system: There is chronic occlusion of the left internal  carotid artery.    Right vertebral artery: Minimal plaque. No stenosis or dissection.    Left vertebral artery: There is some calcific plaque near the origin.  There is no stenosis or dissection.    [place]: The internal carotid artery is occluded to the  carotid terminus on the left. The right distal internal carotid artery  is patent with some calcific plaque in the carotid siphon region, but  no stenosis. Both anterior cerebral arteries, middle cerebral  arteries, basilar artery, and posterior cerebral arteries are patent.  There is moderate stenosis of the left P2 segment of the posterior  cerebral artery. There is both a patent anterior communicating artery  and a left posterior communicating artery supplying the left middle  cerebral artery and left anterior cerebral artery. There is no  evidence for aneurysm.      Impression    IMPRESSION:  1. Chronically occluded left internal carotid artery.  2. Moderate stenosis of the left P2 segment which is in the 70th  percentile range.    RACHELLE STANFORD MD   CT Head Perfusion w Contrast    Narrative    CT HEAD PERFUSION WITH CONTRAST 4/18/2020 7:49 PM     HISTORY: Code stroke. Difficulty with word finding and walking.    TECHNIQUE: Axial images were obtained through the brain with  intravenous contrast for CT brain perfusion imaging. 50 mL of  Isovue-370 was given. Radiation dose for this scan was reduced using  automated exposure control, adjustment of the mA and/or kV according  to patient size, or iterative reconstruction technique.    COMPARISON: 8/24/2019.    FINDINGS: There is an old infarct in the left frontal region. There is  some subtle altered perfusion in the posterior left cerebral  hemisphere that could be related to the known internal carotid artery  occlusion. There is no convincing evidence for any acute  infarct.      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 Some altered perfusion is again  noted in the posterior left cerebral hemisphere similar to that seen  on the prior exam, but this may just be related to the known left  internal carotid artery occlusion. If clinically indicated, MRI might  be helpful for further evaluation.    RACHELLE STANFORD MD     Medications     sodium chloride 50 mL/hr at 04/19/20 0649       aspirin  300 mg Rectal Once     insulin aspart  1-7 Units Subcutaneous TID AC     insulin aspart  1-5 Units Subcutaneous At Bedtime     sodium chloride (PF)  3 mL Intracatheter Q8H

## 2020-04-19 NOTE — PLAN OF CARE
New admission from ED.VSS except mild hypotension improved.A&ox2  forgetful.Neuro's intact except WFD and slurred speech.Tele SD with PAC&BBB. Sleeping most of the shift.On NPO.Plan to have neuro consult this morning.

## 2020-04-19 NOTE — PROGRESS NOTES
Clinical Swallow Evaluation:      04/19/20 1300   General Information   Onset Date 04/18/20   Start of Care Date 04/19/20   Referring Physician Pako Mayfield DO    Patient Profile Review/OT: Additional Occupational Profile Info See Profile for full history and prior level of function   Patient/Family Goals Statement to drink water   Swallowing Evaluation Bedside swallow evaluation   Behaviorial Observations WFL (within functional limits)   Mode of current nutrition NPO   Respiratory Status Room air   Comments Pt admitted with word finding difficulties, currently being worked up for CVA vs seizure. Known to SLP from recent admission where DD1/thin was recommended - pt was regular diet prior to that admit per wife and per patient this date he has been on regular since returning home. Pt NPO, failed RN dysphagia screen d/t aphasia/dysarthria. MRI to be completed later this date.    Clinical Swallow Evaluation   Oral Musculature generally intact   Structural Abnormalities none present   Dentition present and adequate   Mucosal Quality dry  (black on lingual surface, some improvemnet s/p teeth brushin)   Oral Musculature Comments WNL   Additional Documentation Yes   Clinical Swallow Eval: Thin Liquid Texture Trial   Mode of Presentation, Thin Liquids cup;straw;self-fed   Volume of Liquid or Food Presented 8 oz   Oral Phase of Swallow Premature pharyngeal entry   Pharyngeal Phase of Swallow impaired;reduction in laryngeal movement   Diagnostic Statement no overt signs/sx with cup drinking, wet vocal quality with straw   Clinical Swallow Eval: Puree Solid Texture Trial   Mode of Presentation, Puree spoon;self-fed   Volume of Puree Presented 2 oz   Oral Phase, Puree WFL   Pharyngeal Phase, Puree intact   Diagnostic Statement no signs/sx aspiration   Clinical Swallow Eval: Semisolid Texture Trial   Mode of Presentation, Semisolid self-fed   Volume of Semisolid Food Presented 2 oz   Oral Phase, Semisolid WFL   Pharyngeal  Phase, Semisolid intact   Diagnostic Statement no signs/sx aspiration    Clinical Swallow Eval: Solid Food Texture Trial   Mode of Presentation, Solid self-fed   Volume of Solid Food Presented 2 oz   Oral Phase, Solid Poor AP movement;Residue in oral cavity   Pharyngeal Phase, Solid intact   Diagnostic Statement no signs/sx aspiration   Esophageal Phase of Swallow   Patient reports or presents with symptoms of esophageal dysphagia No   General Therapy Interventions   Planned Therapy Interventions Dysphagia Treatment   Dysphagia treatment Modified diet education;Instruction of safe swallow strategies   Intervention Comments Full speech/language eval   Swallow Eval: Clinical Impressions   Skilled Criteria for Therapy Intervention Skilled criteria met.  Treatment indicated.   Functional Assessment Scale (FAS) 5   Treatment Diagnosis mild dysphagia   Diet texture recommendations Dysphagia diet level 2;Thin liquids   Recommended Feeding/Eating Techniques alternate between small bites and sips of food/liquid;maintain upright posture during/after eating for 30 mins;no straws;small sips/bites   Therapy Frequency 5x/week   Predicted Duration of Therapy Intervention (days/wks) 1 week   Anticipated Discharge Disposition   (TCU)   Risks and Benefits of Treatment have been explained. Yes   Patient, family and/or staff in agreement with Plan of Care Yes   Total Evaluation Time   Total Evaluation Time (Minutes) 50

## 2020-04-19 NOTE — PLAN OF CARE
RECEIVING UNIT ED HANDOFF REVIEW    ED Nurse Handoff Report was reviewed by: Alicia Haywood RN on April 18, 2020 at 9:58 PM

## 2020-04-19 NOTE — CONSULTS
"  [facility]    Stroke Consult Note    Reason for Consult:  \"TIA vs seizure\"    Chief Complaint: Aphasia (period of aphasia at home, then unable to stand.  speech currently slow, confused)       YINKA Pearce is a 89 year old male with past medical history significant for chronic L ICA occlusion, L MCA territory infarct, HTN, HLD, DM2, and recurrent spells of speech difficulty (TIA vs seizure) who presented to the [facility] ED last night for evaluation of speech difficulty. He has had extensive inpatient evaluation for these spells in the past, without clear explanation for his symptoms. CT/CTA/CTP performed in the ED were without acute pathology.     Today on exam, Mr. Pearce has returned to baseline. He is able to speak without difficulty and denies other neurologic deficits.     Impression  Episode of altered speech and confusion, concern for seizure vs. Stroke - This episode was consistent with prior episodes, most recently felt to be due to seizures.    Recommendations  - MRI brain, if negative for stroke recommend further evaluation with general neurology  - Continue PTA lacosamide 50 mg BID and Keppra 750 mg BID  - LDL within goal on 3/6/20  - Last A1c 8.3, tighter long term glucose control needed to achieve goal <7.0    Patient Follow-up    - final recommendation pending work-up    ASPEN Townsend, [facility]  Neurology  04/19/2020 3:24 PM  To page stroke neurology after hours or on a subsequent day, click here: [facility]COM  Choose \"On Call\" tab at top, then search dropdown box for \"Neurology Adult\" & press Enter, look for Neuro ICU/Stroke  _____________________________________________________    Past Medical History   Past Medical History:   Diagnosis Date     Abdominal aortic aneurysm (H) 12/25/2016     Acute on chronic systolic congestive heart failure (H) 6/7/2018     Anemia 6/7/2018     Anemia due to blood loss, acute 6/13/2017     Aortic stenosis     mild     Benign essential " hypertension 1/6/2017     Benign non-nodular prostatic hyperplasia with lower urinary tract symptoms 10/20/2016     CAD (coronary artery disease)     MI 1970     Carotid artery stenosis 10/20/2016    chronically occluded left internal carotid artery     Cerebrovascular accident (H) 10/20/2016     Cognitive impairment 6/7/2018     Coronary artery disease of native artery of native heart with stable angina pectoris (H) 10/20/2016    MI 1970     Depression, unspecified depression type 2/22/2018     Diabetes mellitus (H)      Diabetic ulcer of left foot associated with diabetes mellitus due to underlying condition (H) 6/12/2017     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     Gastro-oesophageal reflux disease      Gastroesophageal reflux disease without esophagitis 10/20/2016     Heart attack (H)      Hyperlipidemia      Hyperlipidemia, unspecified hyperlipidemia type 10/20/2016     Ischemic cardiomyopathy      Lumbar back pain 12/30/2016     Mild cognitive impairment 10/20/2016     Nephrolithiasis     RECURRENT     NSTEMI (non-ST elevated myocardial infarction) (H) 6/7/2018     Osteopenia      PAD (peripheral artery disease) (H)     peripheral angiogram 5/2017: The common iliac artery stenoses were stented and the superficial femoral artery stenoses were angioplastied     Paroxysmal atrial fibrillation (H)      Peripheral artery disease (H)     peripheral angiogram 5/2017: The common iliac artery stenoses were stented and the superficial femoral artery stenoses were angioplastied     RBBB with left anterior fascicular block      Slow transit constipation 2/22/2018     Stroke of unknown etiology (H) 12/31/2017     Syncope      TIA (transient ischemic attack) 1/20/2017     Unspecified cerebral artery occlusion with cerebral infarction      UTI (urinary tract infection) due to Enterococcus 2/22/2018     Ventricular tachycardia (H)     nonsustained     Past Surgical History   Past Surgical History:    Procedure Laterality Date     AMPUTATE FOOT Left 6/4/2017    Procedure: AMPUTATE FOOT;  Sun Add#3: partial left foot amputation - Sagital Saw - Mini C-Arm;  Surgeon: Moe Kirk DPM;  Location:  OR     CHOLECYSTECTOMY       ENDARTERECTOMY CAROTID Right 1/3/2018    Procedure: ENDARTERECTOMY CAROTID;  RIGHT CAROTID ENDARTERECTOMY WITH EEG;  Surgeon: Roland Rodriguez MD;  Location:  OR     ESOPHAGOSCOPY, GASTROSCOPY, DUODENOSCOPY (EGD), COMBINED N/A 12/26/2016    Procedure: COMBINED ESOPHAGOSCOPY, GASTROSCOPY, DUODENOSCOPY (EGD);  Surgeon: Nasim Louis MD;  Location:  GI     GENITOURINARY SURGERY      KIDNEY STONE REMOVAL X 4     HC UGI ENDOSCOPY W EUS N/A 12/29/2016    Procedure: COMBINED ENDOSCOPIC ULTRASOUND, ESOPHAGOSCOPY, GASTROSCOPY, DUODENOSCOPY (EGD);  Surgeon: Nasim Louis MD;  Location:  GI     HERNIA REPAIR       INCISION AND DRAINAGE FOOT, COMBINED Left 6/6/2017    Procedure: COMBINED INCISION AND DRAINAGE FOOT;  REVISIONAL LEFT FOOT INCISION AND DRAINAGE WITH POSSIBLE FLAP CLOSURE , ANTIBIOTIC SOLUTION ;  Surgeon: Nabil Ann DPM;  Location:  OR     LASER HOLMIUM LITHOTRIPSY URETER(S), INSERT STENT, COMBINED  3/2/2012    Procedure:COMBINED CYSTOSCOPY, URETEROSCOPY, LASER HOLMIUM LITHOTRIPSY URETER(S), INSERT STENT; CYSTOSCOPY, RIGHT RETROGRADES, RIGHT URETEROSCOPY, HOLMIUM LASER, RIGHT STENT PLACEMENT; Surgeon:ANJELICA LEÓN; Location: OR     ROTATOR CUFF REPAIR RT/LT       Medications   Home Meds  Prior to Admission medications    Medication Sig Start Date End Date Taking? Authorizing Provider   acetaminophen (TYLENOL) 500 MG tablet Take 500-1,000 mg by mouth every 6 hours as needed for mild pain    Unknown, Entered By History   aspirin EC 81 MG EC tablet Take 1 tablet (81 mg) by mouth daily 1/21/17   Tra Reynoso MD   atorvastatin (LIPITOR) 40 MG tablet Take 40 mg by mouth daily    Unknown, Entered By History   bisacodyl  (DULCOLAX) 10 MG suppository Place 1 suppository (10 mg) rectally daily as needed for constipation 3/5/20   Matt Morrissey MD   blood glucose monitoring (NO BRAND SPECIFIED) meter device kit Use to test blood sugar bid times daily or as directed. 11/29/18   Matt Morrissey MD   DULoxetine (CYMBALTA) 30 MG capsule TAKE 1 CAPSULE(30 MG) BY MOUTH DAILY 9/20/19   Matt Morrissey MD   insulin glargine (LANTUS PEN) 100 UNIT/ML pen Inject 10 Units Subcutaneous At Bedtime 3/9/20   Patrizia Couch MD   ipratropium (ATROVENT) 0.03 % nasal spray INHALE 1 SPRAY IN EACH NOSTRIL EVERY 12 HOURS AS NEEDED 5/6/19   Matt Morrissey MD   lacosamide (VIMPAT) 50 MG TABS tablet Take 1 tablet (50 mg) by mouth 2 times daily 3/9/20   Patrizia Couch MD   levETIRAcetam (KEPPRA) 750 MG tablet Take 1 tablet (750 mg) by mouth 2 times daily 3/9/20   Patrizia Couch MD   MAGNESIUM-OXIDE 400 (241.3 Mg) MG tablet TAKE 1 TABLET BY MOUTH TWICE DAILY 8/14/19   Matt Morrissey MD   metFORMIN (GLUCOPHAGE) 1000 MG tablet TAKE 1 TABLET BY MOUTH TWICE DAILY WITH MEALS 2/3/20   Matt Morrissey MD   metoprolol succinate ER (TOPROL-XL) 25 MG 24 hr tablet Take 12.5 mg by mouth At Bedtime    Unknown, Entered By History   nitroGLYcerin (NITROSTAT) 0.4 MG sublingual tablet For chest pain place 1 tablet under the tongue every 5 minutes for 3 doses. If symptoms persist 5 minutes after 1st dose call 911. 5/31/18   Brandyn Graves PA   omeprazole (PRILOSEC) 40 MG DR capsule TAKE 1 CAPSULE(40 MG) BY MOUTH DAILY 30 TO 60 MINUTES BEFORE A MEAL 4/6/20   Matt Morrissey MD   order for DME Equipment being ordered: wheeled walker with hand breaks and seat 9/23/19   Matt Morrissey MD   order for DME Equipment being ordered: True Matrix Blood Glucose meter. 6/8/18   Matt Morrissey MD   polyethylene glycol (MIRALAX) packet Take 1 packet by mouth 2 times daily as needed for constipation 3/23/20   Reported, Patient   polyethylene glycol (MIRALAX) packet  "Take 1 packet by mouth 2 times daily Hold for loose bm 3/23/20   Reported, Patient   polyethylene glycol-propylene glycol (SYSTANE ULTRA) 0.4-0.3 % SOLN ophthalmic solution Place 2 drops into both eyes daily as needed for dry eyes    Unknown, Entered By History   sennosides (SENOKOT) 8.6 MG tablet Take 2 tablets by mouth daily 19   Alexandre Petty APRN CNP   tamsulosin (FLOMAX) 0.4 MG capsule TAKE 1 CAPSULE BY MOUTH DAILY 19   Matt Morrissey MD   ticagrelor (BRILINTA) 90 MG tablet Take 1 tablet (90 mg) by mouth 2 times daily 10/10/19   Matt Morrissey MD   TRUE METRIX BLOOD GLUCOSE TEST test strip USE TO TEST THREE TIMES DAILY OR AS DIRECTED 10/11/19   Matt Morrissey MD       Scheduled Meds    aspirin  300 mg Rectal Once     insulin aspart  1-7 Units Subcutaneous TID AC     insulin aspart  1-5 Units Subcutaneous At Bedtime     sodium chloride (PF)  3 mL Intracatheter Q8H       Infusion Meds    sodium chloride 50 mL/hr at 20 0649       PRN Meds  acetaminophen, acetaminophen, bisacodyl, glucose **OR** dextrose **OR** glucagon, lidocaine 4%, lidocaine (buffered or not buffered), melatonin, naloxone, nitroGLYcerin, ondansetron **OR** ondansetron, polyethylene glycol, prochlorperazine **OR** prochlorperazine **OR** prochlorperazine, senna-docusate **OR** senna-docusate, sodium chloride (PF)    Allergies   Allergies   Allergen Reactions     Oxycodone Other (See Comments)     \"TERRIBLE SWEATING\"     Sulfa Drugs      Tetracycline      Family History   Family History   Problem Relation Age of Onset     Breast Cancer Mother      Heart Failure Father 81     Social History   Social History     Tobacco Use     Smoking status: Former Smoker     Packs/day: 1.00     Years: 30.00     Pack years: 30.00     Types: Cigarettes     Start date:      Last attempt to quit: 1999     Years since quittin.3     Smokeless tobacco: Never Used   Substance Use Topics     Alcohol use: Not Currently     " Alcohol/week: 7.0 standard drinks     Types: 7 Standard drinks or equivalent per week     Comment: 1 drink per biweekly     Drug use: No       Review of Systems   The 10 point Review of Systems is negative other than noted in the HPI or here.        PHYSICAL EXAMINATION   Temp:  [97.3  F (36.3  C)-98.5  F (36.9  C)] 98.5  F (36.9  C)  Pulse:  [49-61] 61  Heart Rate:  [48-62] 58  Resp:  [14-27] 16  BP: ()/(43-73) 103/52  SpO2:  [92 %-99 %] 92 %    Neurologic  Mental Status:  alert, oriented x 3, follows commands, speech clear and fluent, naming and repetition normal  Cranial Nerves:  EOMI with normal smooth pursuit, facial movements symmetric, hearing not formally tested but intact to conversation, no dysarthria, tongue protrusion midline  Motor:  no abnormal movements, able to move all limbs antigravity spontaneously with no signs of hemiparesis observed, no pronator drift  Reflexes:  unable to test (telestroke)  Sensory:  light touch sensation intact and symmetric throughout upper and lower extremities (assessed by nurse)  Coordination:  no obvious ataxia  Station/Gait:  unable to test due to telestroke    Dysphagia Screen  Per Nursing    Stroke Scales    NIHSS  Interval baseline (04/18/20 2200)   Interval Comments     1a. Level of Consciousness 0-->Alert, keenly responsive   1b. LOC Questions 0-->Answers both questions correctly   1c. LOC Commands 0-->Performs both tasks correctly   2.   Best Gaze 0-->Normal   3.   Visual 0-->No visual loss   4.   Facial Palsy 0-->Normal symmetrical movements   5a. Motor Arm, Left 0-->No drift, limb holds 90 (or 45) degrees for full 10 secs   5b. Motor Arm, Right 0-->No drift, limb holds 90 (or 45) degrees for full 10 secs   6a. Motor Leg, Left 0-->No drift, leg holds 30 degree position for full 5 secs   6b. Motor Leg, right 0-->No drift, leg holds 30 degree position for full 5 secs   7.   Limb Ataxia 0-->Absent   8.   Sensory 0-->Normal, no sensory loss   9.   Best Language  0-->No aphasia, normal   10. Dysarthria 0-->Normal   11. Extinction and Inattention  0-->No abnormality   Total 0 (04/19/20 1458)       Imaging  I personally reviewed all imaging; relevant findings per HPI.    Labs Data   CBC  Recent Labs   Lab 04/18/20 1944   WBC 8.1   RBC 4.28*   HGB 11.9*   HCT 38.3*        Basic Metabolic Panel   Recent Labs   Lab 04/18/20 1944      POTASSIUM 4.3   CHLORIDE 102   CO2 28   BUN 17   CR 0.89   *   ALLISON 9.1     Liver Panel  Recent Labs   Lab Test 03/06/20  0014 08/25/19  0620 07/17/19  1548   PROTTOTAL 7.0 6.1* 6.5*   ALBUMIN 3.5 3.0* 3.2*   BILITOTAL 0.6 0.4 0.5   ALKPHOS 73 68 83   AST 26 15 17   ALT 19 13 18     INR  Recent Labs   Lab Test 04/18/20  1944 03/05/20  1411 08/24/19  1825   INR 1.08 1.07 0.94      Lipid Profile  Recent Labs   Lab Test 03/06/20  0014 08/25/19  0620 04/09/19  0626  12/18/15 12/05/14 11/22/13   CHOL 146 118 134   < > 198 160 151   HDL 52 43 47   < > 39* 48 41   LDL 69 49 64   < > 123 82 71   TRIG 123 130 113   < > 181* 149 197*   CHOLHDLRATIO  --   --   --   --  5.1* 3.3 3.7    < > = values in this interval not displayed.     A1C  Recent Labs   Lab Test 03/05/20  1159 09/23/19  1512 07/01/19  0607   A1C 8.3* 7.6* 8.3*     Troponin I  Recent Labs   Lab 04/19/20  0720 04/19/20  0429 04/19/20  0002   TROPI 0.034 0.035 0.042          Stroke Code / Stroke Consult Data Data Telestroke Service Details  (for non-emergent stroke consult with tele)  Video start time 04/19/20   1022   Video end time 04/19/20   1030   Type of service telemedicine diagnostic assessment of acute neurological changes   Reason telemedicine is appropriate patient requires assessment with a specialist for diagnosis and treatment of neurological symptoms   Mode of transmission secure interactive audio and video communication per Kimi   Originating site (patient location) [facility]    Distant site (provider location) Provider office

## 2020-04-19 NOTE — ED NOTES
"[facility]  ED Nurse Handoff Report    ED Chief complaint: Aphasia (period of aphasia at home, then unable to stand.  speech currently slow, confused)      ED Diagnosis:   Final diagnoses:   Aphasia - Improved   History of CVA (cerebrovascular accident)       Code Status: Full Code    Allergies:   Allergies   Allergen Reactions     Oxycodone Other (See Comments)     \"TERRIBLE SWEATING\"     Sulfa Drugs      Tetracycline        Patient Story: Pt aphasic noticed by wife earlier today.   Focused Assessment:  Pt having occasional confusion and aphasia, slow to follow commands at times    Treatments and/or interventions provided: Head CT, EKG,   Patient's response to treatments and/or interventions: no changes    To be done/followed up on inpatient unit:  continuous observation for changes and MRI     Does this patient have any cognitive concerns?: Forgetful    Activity level - Baseline/Home:  Unknown  Activity Level - Current:   Unknown    Patient's Preferred language: English   Needed?: No    Isolation: None  Infection: Not Applicable  Bariatric?: No    Vital Signs:   Vitals:    04/18/20 1945 04/18/20 1951 04/18/20 2000 04/18/20 2104   BP: 114/73  114/51 118/67   Pulse:   (!) 49 (!) 49   Resp: 14  16 27   Temp:  97.3  F (36.3  C)     TempSrc:  Oral     SpO2: 98%  96% 92%   Weight:           Cardiac Rhythm:Cardiac Rhythm: Sinus bradycardia    Was the PSS-3 completed:   No -  What interventions are required if any?               Family Comments: Wife Halina Pearce [phone]  OBS brochure/video discussed/provided to patient/family: No              Name of person given brochure if not patient: none              Relationship to patient: none    For the majority of the shift this patient's behavior was Green.   Behavioral interventions performed were none.    ED NURSE PHONE NUMBER: [phone]         "

## 2020-04-19 NOTE — CONSULTS
"  [facility]    Stroke Consult Note    Reason for Consult: Difficulty speaking     Chief Complaint: Aphasia (period of aphasia at home, then unable to stand.  speech currently slow, confused)      YINKA  Dustin Pearce is a 89 year old gentleman with a chronic L ICA occlusion, left MCA territory infarction and recurrent spells consisting of garbled speech/aphasia (seizure versus TIA), among other things, who presented to the emergency department with confusion and difficulty speaking. He was at home with his wife when she noticed he was having difficulty communicating. Per ED note it sounded like \"word salad\". I called the patient's wife for collateral history but there was no answer. Upon evaluation in the ED he was normotensive and was noted to have clear difficulty communicating. No other focal deficits were noted. Head CT and CTA of the head/neck were stable/without acute findings. CTP showed a perfusion deficit involving the left hemisphere, also noted in a pervious perfusion scan, thought to be possibly related to the chronic ICA occlusion.     A tele evaluation was initiated. The patient was without distress. Could answer some orientation questions such as his name, age and current place, but could not describe why he was in the hospital or tell me the month. He was perseverative with his answers. Could name some objects and repeat. No focal weakness, ataxia or gaze deviation. The patient's symptoms were thought to represent either an encephalopathy (perhaps post-ictal) or minor TIA/stroke with resolving aphasia (mild at the time of tele evaluation). Therefore the stroke code was de-escalated. Besides the mild, resolving symptoms, the patient was not a tPA candidate given an acute stroke in the right parietal lobe found in early March 2020.     TPA Treatment   Not given due to minor / isolated / quickly resolving stroke symptoms, head trauma / stroke within past 3 months.    Endovascular " Treatment  Not initiated due to absence of proximal vessel occlusion    Impression  Spell of unclear etiology. Seizure, encephalopathy, stroke/TIA (see HPI for additional details)    Recommendations  Acute Ischemic Stroke (without tPA) Recommendations  - Permissive HTN; labetalol PRN for SBP > 200  - Daily aspirin 325 mg for secondary stroke prevention  - Statin: atorvastatin 40 mg daily   - MRI Stroke Protocol  - Telemetry, EKG  - Bedside Glucose Monitoring  - A1c, Lipid Panel, Troponin x 3  - PT/OT/SLP  - PM&R  - Stroke Education  - Euthermia, Euglycemia    Patient Follow-up    - final recommendation pending work-up    Thank you for this consult. We will continue to follow.     The Stroke Staff is Dr. Shaw.    Jose Huertas MD  NeuroCritical Care Fellow  ______________________________________________________    Past Medical History   Past Medical History:   Diagnosis Date     Abdominal aortic aneurysm (H) 12/25/2016     Acute on chronic systolic congestive heart failure (H) 6/7/2018     Anemia 6/7/2018     Anemia due to blood loss, acute 6/13/2017     Aortic stenosis     mild     Benign essential hypertension 1/6/2017     Benign non-nodular prostatic hyperplasia with lower urinary tract symptoms 10/20/2016     CAD (coronary artery disease)     MI 1970     Carotid artery stenosis 10/20/2016    chronically occluded left internal carotid artery     Cerebrovascular accident (H) 10/20/2016     Cognitive impairment 6/7/2018     Coronary artery disease of native artery of native heart with stable angina pectoris (H) 10/20/2016    MI 1970     Depression, unspecified depression type 2/22/2018     Diabetes mellitus (H)      Diabetic ulcer of left foot associated with diabetes mellitus due to underlying condition (H) 6/12/2017     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     Gastro-oesophageal reflux disease      Gastroesophageal reflux disease without esophagitis 10/20/2016     Heart attack (H)       Hyperlipidemia      Hyperlipidemia, unspecified hyperlipidemia type 10/20/2016     Ischemic cardiomyopathy      Lumbar back pain 12/30/2016     Mild cognitive impairment 10/20/2016     Nephrolithiasis     RECURRENT     NSTEMI (non-ST elevated myocardial infarction) (H) 6/7/2018     Osteopenia      PAD (peripheral artery disease) (H)     peripheral angiogram 5/2017: The common iliac artery stenoses were stented and the superficial femoral artery stenoses were angioplastied     Paroxysmal atrial fibrillation (H)      Peripheral artery disease (H)     peripheral angiogram 5/2017: The common iliac artery stenoses were stented and the superficial femoral artery stenoses were angioplastied     RBBB with left anterior fascicular block      Slow transit constipation 2/22/2018     Stroke of unknown etiology (H) 12/31/2017     Syncope      TIA (transient ischemic attack) 1/20/2017     Unspecified cerebral artery occlusion with cerebral infarction      UTI (urinary tract infection) due to Enterococcus 2/22/2018     Ventricular tachycardia (H)     nonsustained     Past Surgical History   Past Surgical History:   Procedure Laterality Date     AMPUTATE FOOT Left 6/4/2017    Procedure: AMPUTATE FOOT;  Sun Add#3: partial left foot amputation - Sagital Saw - Mini C-Arm;  Surgeon: Moe Kirk DPM;  Location:  OR     CHOLECYSTECTOMY       ENDARTERECTOMY CAROTID Right 1/3/2018    Procedure: ENDARTERECTOMY CAROTID;  RIGHT CAROTID ENDARTERECTOMY WITH EEG;  Surgeon: Roland Rodriguez MD;  Location:  OR     ESOPHAGOSCOPY, GASTROSCOPY, DUODENOSCOPY (EGD), COMBINED N/A 12/26/2016    Procedure: COMBINED ESOPHAGOSCOPY, GASTROSCOPY, DUODENOSCOPY (EGD);  Surgeon: Nasim Louis MD;  Location:  GI     GENITOURINARY SURGERY      KIDNEY STONE REMOVAL X 4     HC UGI ENDOSCOPY W EUS N/A 12/29/2016    Procedure: COMBINED ENDOSCOPIC ULTRASOUND, ESOPHAGOSCOPY, GASTROSCOPY, DUODENOSCOPY (EGD);  Surgeon: Nasim Louis  MD Milagros;  Location:  GI     HERNIA REPAIR       INCISION AND DRAINAGE FOOT, COMBINED Left 6/6/2017    Procedure: COMBINED INCISION AND DRAINAGE FOOT;  REVISIONAL LEFT FOOT INCISION AND DRAINAGE WITH POSSIBLE FLAP CLOSURE , ANTIBIOTIC SOLUTION ;  Surgeon: Nabil Ann DPM;  Location:  OR     LASER HOLMIUM LITHOTRIPSY URETER(S), INSERT STENT, COMBINED  3/2/2012    Procedure:COMBINED CYSTOSCOPY, URETEROSCOPY, LASER HOLMIUM LITHOTRIPSY URETER(S), INSERT STENT; CYSTOSCOPY, RIGHT RETROGRADES, RIGHT URETEROSCOPY, HOLMIUM LASER, RIGHT STENT PLACEMENT; Surgeon:ANJELICA LEÓN; Location: OR     ROTATOR CUFF REPAIR RT/LT       Medications   Home Meds  Prior to Admission medications    Medication Sig Start Date End Date Taking? Authorizing Provider   acetaminophen (TYLENOL) 500 MG tablet Take 500-1,000 mg by mouth every 6 hours as needed for mild pain    Unknown, Entered By History   aspirin EC 81 MG EC tablet Take 1 tablet (81 mg) by mouth daily 1/21/17   Tra Reynoso MD   atorvastatin (LIPITOR) 40 MG tablet Take 40 mg by mouth daily    Unknown, Entered By History   bisacodyl (DULCOLAX) 10 MG suppository Place 1 suppository (10 mg) rectally daily as needed for constipation 3/5/20   Matt Morrissey MD   blood glucose monitoring (NO BRAND SPECIFIED) meter device kit Use to test blood sugar bid times daily or as directed. 11/29/18   Matt Morrissey MD   DULoxetine (CYMBALTA) 30 MG capsule TAKE 1 CAPSULE(30 MG) BY MOUTH DAILY 9/20/19   Matt Morrissey MD   insulin glargine (LANTUS PEN) 100 UNIT/ML pen Inject 10 Units Subcutaneous At Bedtime 3/9/20   Patrizia Couch MD   ipratropium (ATROVENT) 0.03 % nasal spray INHALE 1 SPRAY IN EACH NOSTRIL EVERY 12 HOURS AS NEEDED 5/6/19   Matt Morrissey MD   lacosamide (VIMPAT) 50 MG TABS tablet Take 1 tablet (50 mg) by mouth 2 times daily 3/9/20   Patrizia Couch MD   levETIRAcetam (KEPPRA) 750 MG tablet Take 1 tablet (750 mg) by mouth 2 times  daily 3/9/20   Patrizia Couch MD   MAGNESIUM-OXIDE 400 (241.3 Mg) MG tablet TAKE 1 TABLET BY MOUTH TWICE DAILY 8/14/19   Matt Morrissey MD   metFORMIN (GLUCOPHAGE) 1000 MG tablet TAKE 1 TABLET BY MOUTH TWICE DAILY WITH MEALS 2/3/20   Matt Morrissey MD   metoprolol succinate ER (TOPROL-XL) 25 MG 24 hr tablet Take 12.5 mg by mouth At Bedtime    Unknown, Entered By History   nitroGLYcerin (NITROSTAT) 0.4 MG sublingual tablet For chest pain place 1 tablet under the tongue every 5 minutes for 3 doses. If symptoms persist 5 minutes after 1st dose call 911. 5/31/18   Brandyn Graves PA   omeprazole (PRILOSEC) 40 MG DR capsule TAKE 1 CAPSULE(40 MG) BY MOUTH DAILY 30 TO 60 MINUTES BEFORE A MEAL 4/6/20   Matt Morrissey MD   order for DME Equipment being ordered: wheeled walker with hand breaks and seat 9/23/19   Matt Morrissey MD   order for DME Equipment being ordered: True Matrix Blood Glucose meter. 6/8/18   Matt Morrissey MD   polyethylene glycol (MIRALAX) packet Take 1 packet by mouth 2 times daily as needed for constipation 3/23/20   Reported, Patient   polyethylene glycol (MIRALAX) packet Take 1 packet by mouth 2 times daily Hold for loose bm 3/23/20   Reported, Patient   polyethylene glycol-propylene glycol (SYSTANE ULTRA) 0.4-0.3 % SOLN ophthalmic solution Place 2 drops into both eyes daily as needed for dry eyes    Unknown, Entered By History   sennosides (SENOKOT) 8.6 MG tablet Take 2 tablets by mouth daily 8/30/19   Alexandre Petty, APRN CNP   tamsulosin (FLOMAX) 0.4 MG capsule TAKE 1 CAPSULE BY MOUTH DAILY 11/25/19   Matt Morrissey MD   ticagrelor (BRILINTA) 90 MG tablet Take 1 tablet (90 mg) by mouth 2 times daily 10/10/19   Matt Morrissey MD   TRUE METRIX BLOOD GLUCOSE TEST test strip USE TO TEST THREE TIMES DAILY OR AS DIRECTED 10/11/19   Matt Morrissey MD       Scheduled Meds      Infusion Meds      PRN Meds      Allergies   Allergies   Allergen Reactions     Oxycodone Other (See  "Comments)     \"TERRIBLE SWEATING\"     Sulfa Drugs      Tetracycline      Family History   Family History   Problem Relation Age of Onset     Breast Cancer Mother      Heart Failure Father 81     Social History   Social History     Tobacco Use     Smoking status: Former Smoker     Packs/day: 1.00     Years: 30.00     Pack years: 30.00     Types: Cigarettes     Start date:      Last attempt to quit: 1999     Years since quittin.3     Smokeless tobacco: Never Used   Substance Use Topics     Alcohol use: Not Currently     Alcohol/week: 7.0 standard drinks     Types: 7 Standard drinks or equivalent per week     Comment: 1 drink per biweekly     Drug use: No       Review of Systems   Review of systems not obtained due to patient factors - confusion       PHYSICAL EXAMINATION  Temp:  [97.3  F (36.3  C)-98.1  F (36.7  C)] 98.1  F (36.7  C)  Pulse:  [49-52] 49  Heart Rate:  [48-60] 60  Resp:  [14-27] 16  BP: ()/(43-73) 91/43  SpO2:  [92 %-99 %] 95 %     General:  patient lying in bed without any acute distress    HEENT:  normocephalic/atraumatic  Cardio:  bradycardia  Pulmonary:  no respiratory distress    Neurologic  Mental Status:  Alert, oriented to person, age and place. Not oriented to month. Cannot describe current situation or why he is in the emergency department.   Cranial Nerves:  visual fields intact (tested by nurse), EOMI with normal smooth pursuit, facial movements symmetric, no dysarthria, hard of hearing   Motor:  no abnormal movements, able to move all limbs antigravity spontaneously with no signs of hemiparesis observed, no pronator drift  Reflexes:  unable to test (telestroke)  Sensory:  light touch sensation intact and symmetric throughout upper and lower extremities (assessed by nurse), no extinction on double simultaneous stimulation (assessed by nurse)  Coordination:  normal finger-to-nose and heel-to-shin bilaterally without dysmetria  Station/Gait:  unable to test due to telestroke "      Dysphagia Screen  Per Nursing    Stroke Scales    NIHSS  Interval     Interval Comments     1a. Level of Consciousness     1b. LOC Questions     1c. LOC Commands     2.   Best Gaze     3.   Visual     4.   Facial Palsy     5a. Motor Arm, Left     5b. Motor Arm, Right     6a. Motor Leg, Left     6b. Motor Leg, right     7.   Limb Ataxia     8.   Sensory     9.   Best Language     10. Dysarthria     11. Extinction and Inattention      Total         Imaging  I personally reviewed all imaging; relevant findings per HPI.     Lab Results Data   CBC  Recent Labs   Lab 04/18/20 1944   WBC 8.1   RBC 4.28*   HGB 11.9*   HCT 38.3*        Basic Metabolic Panel    Recent Labs   Lab 04/18/20 1944      POTASSIUM 4.3   CHLORIDE 102   CO2 28   BUN 17   CR 0.89   *   ALLISON 9.1     Liver Panel  Recent Labs   Lab Test 03/06/20  0014 08/25/19  0620 07/17/19  1548   PROTTOTAL 7.0 6.1* 6.5*   ALBUMIN 3.5 3.0* 3.2*   BILITOTAL 0.6 0.4 0.5   ALKPHOS 73 68 83   AST 26 15 17   ALT 19 13 18     INR  Recent Labs   Lab Test 04/18/20  1944 03/05/20  1411 08/24/19  1825   INR 1.08 1.07 0.94      Lipid Profile  Recent Labs   Lab Test 03/06/20  0014 08/25/19  0620 04/09/19  0626  12/18/15 12/05/14 11/22/13   CHOL 146 118 134   < > 198 160 151   HDL 52 43 47   < > 39* 48 41   LDL 69 49 64   < > 123 82 71   TRIG 123 130 113   < > 181* 149 197*   CHOLHDLRATIO  --   --   --   --  5.1* 3.3 3.7    < > = values in this interval not displayed.     A1C  Recent Labs   Lab Test 03/05/20  1159 09/23/19  1512 07/01/19  0607   A1C 8.3* 7.6* 8.3*     Troponin Franko results for input(s): TROPI in the last 168 hours.       Stroke Code / Stroke Consult Data Data   Stroke Code Data  (for stroke code with tele)  Stroke code activated 04/18/20 1922   First stroke provider response 04/18/20 1924   Video start time 04/18/20 1953   Video end time 04/18/20 2008   Last known normal 04/18/20   1680   Time of discovery  (or onset of symptoms)   04/18/20   1722   Head CT read by me 04/18/20   1935   Was stroke code de-escalated? Yes 04/18/20 2005  presence of contraindications for both intravenous and intra-arterial stroke treatments        Telestroke Service Details  Type of service telemedicine diagnostic assessment of acute neurological changes   Reason telemedicine is appropriate patient requires assessment with a specialist for diagnosis and treatment of neurological symptoms   Mode of transmission secure interactive audio and video communication per [employer]   Originating site (patient location) [facility]    Distant site (provider location) [facility]

## 2020-04-19 NOTE — ED NOTES
Bed: ED25  Expected date: 4/18/20  Expected time: 7:04 PM  Means of arrival: Ambulance  Comments:  518 89m stroke  ETA 1910

## 2020-04-19 NOTE — PLAN OF CARE
DATE & TIME: 4/19/20 [id number]   Cognitive Concerns/ Orientation : A&Ox2, disoriented to time and situation. [place]  BEHAVIOR & AGGRESSION TOOL COLOR: Green  CIWA SCORE: N/A  ABNL VS/O2: VSS on RA  MOBILITY: Up with 1gb and walker  PAIN MANAGMENT: Denies  DIET: DD2, thin liqs  BOWEL/BLADDER: Urinal at bedside  ABNL LAB/BG: , 133  DRAIN/DEVICES: PIV infusing at 50ml/hr  TELEMETRY RHYTHM: SR with BBB  SKIN: Dry, flaky, bruised  TESTS/PROCEDURES: Waiting for MRI  D/C DAY/GOALS/PLACE: Pending  OTHER IMPORTANT INFO: Neuro, PT,OT, and speech following.

## 2020-04-19 NOTE — PHARMACY-ADMISSION MEDICATION HISTORY
Pharmacy Medication History  Admission medication history interview status for the 4/18/2020  admission is complete. See EPIC admission navigator for prior to admission medications     Medication history sources: Patient's family/friend (Chandrika spouse. )  Medication history source reliability: Good  Adherence assessment: Good    Medication reconciliation completed by provider prior to medication history? Yes    Time spent in this activity: 30 minutes      Prior to Admission medications    Medication Sig Last Dose Taking? Auth Provider   acetaminophen (TYLENOL) 500 MG tablet Take 500-1,000 mg by mouth every 6 hours as needed for mild pain Past Month at Unknown time Yes Unknown, Entered By History   aspirin EC 81 MG EC tablet Take 1 tablet (81 mg) by mouth daily 4/18/2020 at AM Yes Tra Reynoso MD   atorvastatin (LIPITOR) 40 MG tablet Take 40 mg by mouth daily 4/18/2020 at AM Yes Unknown, Entered By History   bisacodyl (DULCOLAX) 10 MG suppository Place 1 suppository (10 mg) rectally daily as needed for constipation  Yes Matt Morrissey MD   DULoxetine (CYMBALTA) 30 MG capsule TAKE 1 CAPSULE(30 MG) BY MOUTH DAILY 4/18/2020 at AM Yes Matt Morrissey MD   insulin glargine (LANTUS PEN) 100 UNIT/ML pen Inject 26 Units Subcutaneous At Bedtime 4/17/2020 at hs Yes Unknown, Entered By History   ipratropium (ATROVENT) 0.03 % nasal spray INHALE 1 SPRAY IN EACH NOSTRIL EVERY 12 HOURS AS NEEDED 4/18/2020 at Unknown time Yes Matt Morrissey MD   lacosamide (VIMPAT) 50 MG TABS tablet Take 1 tablet (50 mg) by mouth 2 times daily 4/18/2020 at AM Yes Patrizia Couch MD   levETIRAcetam (KEPPRA) 750 MG tablet Take 1 tablet (750 mg) by mouth 2 times daily 4/18/2020 at AM Yes Patrizia Couch MD   MAGNESIUM-OXIDE 400 (241.3 Mg) MG tablet TAKE 1 TABLET BY MOUTH TWICE DAILY 4/18/2020 at AM Yes Matt Morrissey MD   metFORMIN (GLUCOPHAGE) 1000 MG tablet TAKE 1 TABLET BY MOUTH TWICE DAILY WITH MEALS 4/18/2020 at AM Yes  Matt Morrissey MD   metoprolol succinate ER (TOPROL-XL) 25 MG 24 hr tablet Take 12.5 mg by mouth At Bedtime 4/17/2020 at HS Yes Unknown, Entered By History   nitroGLYcerin (NITROSTAT) 0.4 MG sublingual tablet For chest pain place 1 tablet under the tongue every 5 minutes for 3 doses. If symptoms persist 5 minutes after 1st dose call 911.  Yes Brandyn Graves PA   omeprazole (PRILOSEC) 40 MG DR capsule TAKE 1 CAPSULE(40 MG) BY MOUTH DAILY 30 TO 60 MINUTES BEFORE A MEAL 4/18/2020 at AM Yes Matt Morrissey MD   polyethylene glycol (MIRALAX) packet Take 1 packet by mouth 2 times daily as needed for constipation 4/17/2020 Yes Reported, Patient   polyethylene glycol-propylene glycol (SYSTANE ULTRA) 0.4-0.3 % SOLN ophthalmic solution Place 2 drops into both eyes daily as needed for dry eyes 4/17/2020 Yes Unknown, Entered By History   sennosides (SENOKOT) 8.6 MG tablet Take 2 tablets by mouth daily  Patient taking differently: Take 2 tablets by mouth daily as needed  Past Month at Unknown time Yes Alexandre Petty APRN CNP   tamsulosin (FLOMAX) 0.4 MG capsule TAKE 1 CAPSULE BY MOUTH DAILY 4/17/2020 Yes Matt Morrissey MD   ticagrelor (BRILINTA) 90 MG tablet Take 1 tablet (90 mg) by mouth 2 times daily 4/18/2020 at AM Yes Matt Morrissey MD   blood glucose monitoring (NO BRAND SPECIFIED) meter device kit Use to test blood sugar bid times daily or as directed.   Matt Morrissey MD   order for DME Equipment being ordered: wheeled walker with hand breaks and seat   Matt Morrissey MD   order for DME Equipment being ordered: True Matrix Blood Glucose meter.   Matt Morrissey MD   TRUE METRIX BLOOD GLUCOSE TEST test strip USE TO TEST THREE TIMES DAILY OR AS DIRECTED   Matt Morrissey MD Sharon Chandler, PharmD, BCPS

## 2020-04-19 NOTE — PLAN OF CARE
Discharge Planner PT   Patient plan for discharge: home with assist from SO  Current status: Orders received. Chart reviewed. Evaluation completed and treatment initiated. Pt is a 89 year old male admitted with speech difficulty, left arm tingling. Hx of seizure disorder, previous CVA. 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 SO confirmed via phone pt's report that the pt ambulates with cane in house, walker outside of house, never does steps alone and has been doing home PT exercises after being discharged from TCU end of March. Pt admits to 2-3 falls in past 6 months and SO reports because he stands instead of sit when urinating.     Currently BP at rest 122/52, after mobility pt had some lightheadedness and 115/46. RN informed. Pt had SBA bed mobility, Cues and CGA for transfers. Stood with use of walker for support. Pt ambualted 150ft and ascended and descended 3 steps with rail and cues and CGA.  Barriers to return to prior living situation: steps,   Recommendations for discharge: Home with use of walker instead of cane, assist of SO per baseline for steps, ADLs of dressing and bathing. Continue HEP  Rationale for recommendations: Pt appears close to baseline for mobility but does have low BP and sensation change left UE and needs walker not cane.       Entered by: Chrissy Chavez 04/19/2020 5:02 PM

## 2020-04-19 NOTE — ED PROVIDER NOTES
"  History     Chief Complaint:  Aphasia     HPI   Dustin Pearce is a 89 year old male who presents with concerns for having a return of strokelike symptoms 2 hours prior to arrival.  Per paramedic report, the patient was at home with his wife when he developed sudden weakness to both of his legs.  He was unable to ambulate.  This seemed to pass after approximately 30 minutes or so.  However, it was then followed by the patient becoming significantly a phasic.  He was only able to communicate to the paramedics with \"word salad\" when they arrived.  However, he was able to ambulate to the EMS cot.    At the time of his arrival to the emergency department, the patient is able to communicate with me somewhat.  He is only able to tell me that he feels like \"horse shit\" but he is unable to explain more of what this means.  He denies any pain.  He denies any vision changes.  He knows that he is at the hospital but is unable to tell me the date, the president, or to perform simple math.    History is significantly limited secondary to patient's acuity of condition.    Allergies:  Oxycodone  Sulfa   Tetracycline     Medications:    Glucophage  Prilosec  Flomax  Brilinta  Cymbalta  Senokot  Keppra  Basaglar kwikpen  Aspirin 81 mg   Lipitor   Dulcolax  Lantus pen   Nitrostat   Flomax     Past Medical History:    Altered mental status  Anemia   Aortic stenosis  Hypertension   Coronary artery disease  Diabetes type 2    Carotid artery stenosis  Cognitive impairment  Depression   Gastro-oesophageal reflux disease  Heart attack  Hyperlipidemia   Ischemic cardiomyopathy  Nephrolithiasis  NSTEMI   Mild cognitive impairment   PAD  Paroxysmal atrial fibrillation  Stroke  Ventricular tachycardia   Syncope  TIA    Past Surgical History:    Amputate left foot  Cholecystectomy   Endarterectomy Cartoid   EGD Combined  Genitourinary surgery - kidney stone removal x 4  HC UGI endoscopy with endoscopic US   Hernia repair   I&D foot combined - " left   Laser holmium lithotripsy ureters, insert stent combined   Rotator cuff repair     Family History:    Mother: Breast cancer  Father: heart failure    Social History:  Smoking status: Former. Quit date: 1999  Alcohol use: No  Drug use: No  The patient presents to the emergency department via EMS  PCP: Matt Morrissey  Marital Status:   [4]    Review of Systems  Review of systems is severely limited secondary to the acuity of the patient's condition (aphasia).      Physical Exam     Patient Vitals for the past 24 hrs:   BP Temp Temp src Pulse Heart Rate Resp SpO2 Weight   04/18/20 2000 114/51 -- -- (!) 49 (!) 49 16 96 % --   04/18/20 1951 -- 97.3  F (36.3  C) Oral -- -- -- -- --   04/18/20 1945 114/73 -- -- -- (!) 49 14 98 % --   04/1935 116/68 -- -- -- -- 14 99 % --   04/18/20 1920 114/64 -- -- -- (!) 48 14 98 % 72.6 kg (160 lb)     Physical Exam   General: Elderly gentleman resting supine on the bed showing no evidence of pain or other respiratory distress    Eye:  Pupils are equal, round, and reactive.  Extraocular movements intact.    ENT:  No rhinorrhea.  Moist mucus membranes.  Normal tongue and tonsil.    Cardiac:  Regular rate and rhythm.  No murmurs, gallops, or rubs.    Pulmonary:  Clear to auscultation bilaterally.  No wheezes, rales, or rhonchi.    Abdomen:  Positive bowel sounds.  Abdomen is soft and non-distended, without focal tenderness.    Musculoskeletal:  Normal movement of all extremities without evidence for deficit.    Skin:  Warm and dry without rashes.    Neurologic: Cranial nerves II through XII are intact.  There is 5 out of 5 strength in all extremities.  There is normal sensation throughout.  However, the patient is unable to communicate terribly well with slurring of his speech and difficulty with word finding.    Psychiatric: Limited exam due to patient's aphasia.      Emergency Department Course   ECG (19:47:17):  Rate 52 bpm. NV interval 186. QRS duration 138.  QT/QTc 542/504. P-R-T axes * -75 -58. Sinus bradycardia with premature atrial complexes in pattern of bigeminy. Left axis deviation. Right bundle branch block. T wave abnormality, consider lateral ischemia. Abnormal ECG. No significant change when compared to EKG dated 3/5/2020. Interpreted at 1950 by Trierweiler, Chad A, MD.    Imaging:  Radiology findings were communicated with the patient who voiced understanding of the findings.    CTA Head Neck with Contrast:   IMPRESSION:  1. Chronically occluded left internal carotid artery.  2. Moderate stenosis of the left P2 segment which is in the 70th  percentile range.   As read by Radiology.    CT Head w/o contrast  IMPRESSION: Chronic changes. No evidence for intracranial hemorrhage  or any acute process.  As read by Radiology.    CT Head Perfusion w Contrast  IMPRESSION: Old left frontal infarct. Some altered perfusion is again  noted in the posterior left cerebral hemisphere similar to that seen  on the prior exam, but this may just be related to the known left  internal carotid artery occlusion. If clinically indicated, MRI might  be helpful for further evaluation  As read by Radiology.    Laboratory:  Laboratory findings were communicated with the patient who voiced understanding of the findings.    CBC: HGB 11.9 (L) o/w WNL (WBC 8.1, )    Partial thromboplastin time: 30    INR (1944): 1.08    BMP: Glucose: 292 (H) o/w WNL (Creatinine 0.89)    Emergency Department Course:  Past medical records, nursing notes, and vitals reviewed.    1909: Code stroke called    1917: I spoke to Dr. Huertas, the stroke neurologist    I performed an exam of the patient and obtained history, as documented above.     EKG was obtained and reviewed in the emergency department.    IV inserted and blood drawn.    The patient was sent for a CT Head Perfusion w Contrast, CT Head w/o contrast, and CTA Head Neck with Contrast while in the emergency department, results above.     2005: I  discussed the patient with the stroke neurologist    2008: I rechecked the patient, who's aphasia had improved.     2030: I discussed the patient with Dr. Shaw of the neurology     2049: I discussed the patient with Dr. Mayfield, of hospitalist.    I rechecked the patient. I reviewed the results with the Patient and answered all related questions prior to admission    Findings and plan explained to the Patient who consents to admission. Discussed the patient with Dr. Mayfield, who will admit the patient to a neuro unit bed for further monitoring, evaluation, and treatment.  Impression & Plan     CMS Diagnoses: The patient has stroke symptoms:         ED Stroke specific documentation           NIHSS PDF     Patient last known well time: 1645  ED Provider first to bedside at: 1907  CT Results received at: 1946    tPA:   Not given due to minor / isolated / quickly resolving stroke symptoms.    If treating with tPA: Ensure SBP<185 and DBP<105 prior to treatment with IV tPA.  Administering IV tPA after treatment with IV labetalol, hydralazine, or nicardipine is reasonable once BP control is established.    Endovascular Retrieval:  Not initiated due to absence of proximal vessel occlusion    National Institutes of Health Stroke Scale (Baseline)  Time Performed: 1909     Score    Level of consciousness: (0)   Alert, keenly responsive    LOC questions: (2)   Answers neither question correctly    LOC commands: (0)   Performs both tasks correctly    Best gaze: (0)   Normal    Visual: (0)   No visual loss    Facial palsy: (0)   Normal symmetrical movements    Motor arm (left): (0)   No drift    Motor arm (right): (0)   No drift    Motor leg (left): (0)   No drift    Motor leg (right): (0)   No drift    Limb ataxia: (0)   Absent    Sensory: (0)   Normal- no sensory loss    Best language: (1)   Mild to moderate aphasia    Dysarthria: (0)   Normal    Extinction and inattention: (0)   No abnormality        Total Score:  3         Stroke Mimics were considered (including migraine headache, seizure disorder, hypoglycemia (or hyperglycemia), head or spinal trauma, CNS infection, Toxin ingestion and shock state (e.g. sepsis) .        Medical Decision Making:  This unfortunate 89-year-old with several recent evaluations for TIA, stroke, and seizure returns to us for another spell of transient ischemic symptoms.  History was chiefly obtained through the paramedic who notes the wife called EMS because of a spell of an inability to walk followed by a spell of significant aphasia.  The aphasia was already improving by the time they arrived here in the emergency department.  Nonetheless, he did show evidence of language deficits and therefore a code stroke was initiated.  Fortunately, his CT shows his old infarcts but no evidence of new infarct or perfusion issues.  Furthermore, he continued to improve throughout his stay in the emergency department, almost returning to normal by the end of my treatment with him.  He was fully assessed by my neuro critical care team and they concur that we will hold off on TPA or further imaging at this juncture.  I spoke with Dr. Mayfield of the hospitalist service who admit the patient to the neurology floor on observation status for close watch and determine if his symptoms merit further work-up.  The patient was advised of this and his questions are answered with him showing full comfort and being admitted to the hospital.      Diagnosis:    ICD-10-CM   1. Aphasia  R47.01      2. History of CVA (cerebrovascular accident)  Z86.73     Disposition:  Admitted to neuro unit bed.    Anabella Howellbenito  4/18/2020    EMERGENCY DEPARTMENT  Scribe Disclosure:  I, Anabella Cox, am serving as a scribe at 7:32 PM on 4/18/2020 to document services personally performed by Trierweiler, Chad A, MD based on my observations and the provider's statements to me.        Trierweiler, Chad A, MD  04/18/20 4842

## 2020-04-20 NOTE — PROVIDER NOTIFICATION
"Neurologist NP paged: \"950 RF: episode of aphasia, inconsistently following commands, not speaking, /66, temp 97.4. Please advise. Thanks - Steven COLLIER, Rn \". NP called back promptly, hospitalist paged for general neurology consult.   "

## 2020-04-20 NOTE — PROGRESS NOTES
General neurology reviewed chart. Currently pt. Is evaluated for Code stroke.    General neurology will hold on consultation at this point.

## 2020-04-20 NOTE — PLAN OF CARE
Discharge Planner SLP   Patient plan for discharge: Not addressed.   Current status: SLP: Patient was seen to re-assess swallow function following a code stroke this am. He demonstrated mild left lip droop, Mild to moderate lingual coordination and ROM.  He demonstrated premature entry of thin liquids via the cup without immediate or delayed Sx of aspiration. Pudding was tolerated with slight delay and no Sx of aspiration. Mastication was mildly prolonged for a semi-solid with mild incoordation with AP transport and minimal oral residue on the left side of his tongue. No vocal changes or coughing noted with trial given at bedside.   Recommend: 1. May continue on the DDL 2 with thin liquids. 2. Fully alert and upright, no straws, assistance/supervision, check for oral residue and alternate liquids/solids as needed. 3. Will complete a speech/language evaluation.   Barriers to return to prior living situation: Aphasia and dysphagia.  Recommendations for discharge: TCU  Rationale for recommendations: Patient will need on going ST needs for dysphagia management and speech/language deficits. Patient is below his baseline.        Entered by: Chloe Mclain 04/20/2020 11:53 AM

## 2020-04-20 NOTE — PLAN OF CARE
Discharge Planner SLP   Patient plan for discharge: Not addressed.   Current status: Patient seen for a speech/language evaluation. Patient scored a 41.67 on the WAB-R indicating severe aphasia. He was able to follow 1-step directions with 80%, but unable to complete 2 step directions. Severe deficits for verbal language for confrontational naming of common objects. He was unable to name or describe what was happening in a picture, but was able to state his name and address. He demonstrated good participation during the evaluation.   Recommend: 1. Daily speech/language treatment to improve functional communication.   Barriers to return to prior living situation: Aphasia and dysphagia  Recommendations for discharge: TCU  Rationale for recommendations: Patient will need continued ST needs at the next level of care for aphasia and dysphagia management. Patient is below his baseline.        Entered by: Chloe Mclain 04/20/2020 1:36 PM

## 2020-04-20 NOTE — PROVIDER NOTIFICATION
MD Notification    Notified Person: MD    Notified Person Name: Uriel    Notification Date/Time: 04/20/20 10:22 AM     Notification Interaction: Webpage    Purpose of Notification: R sided weakness, garbled illogical speech    Orders Received: Call code stroke, neurology to see patient    Comments:

## 2020-04-20 NOTE — PROGRESS NOTES
04/20/20 1200   General Information, SLP   Type of Evaluation Speech and Language   Type of Visit Initial   Start of Care Date 04/20/20   Onset of Illness/Injury or Date of Surgery - Date 04/19/20   Referring Physician Dr. Mayfield   Patient/Family Goals Statement Not able to state.    Pertinent History of Current Problem Dustin Pearce is a 89 year old male with past medical history significant for chronic L ICA occlusion, L MCA territory infarct, HTN, HLD, DM2, and recurrent spells of speech difficulty (TIA vs seizure) who presented to the [facility] ED last night for evaluation of speech difficulty. He has had extensive inpatient evaluation for these spells in the past, without clear explanation for his symptoms.    Precautions/Limitations fall precautions;swallowing precautions   General Observations Alert and [place].    Oral Motor Sensory Function   Completed on Swallow Evaluation Completed Clinical Bedside Swallow Evaluation   Western Aphasia Battery- Revised Bedside Record From   Spontaneous Speech Content Score (out of 10) 3   Spontaneous Speech Fluency Score (out of 10) 5   Auditory Verbal Comprehension Score (out of 10) 7   Sequential Commands Score (out of 10) 0   Repetition Score (out of 10) 5   Object Naming Score (out of 10) 5   Bedside Aphasia Sum 25   WAB-R Bedside Aphasia Score 41.67   Aphasia Severity Level Severe Aphasia   General Therapy Interventions   Planned Therapy Interventions Language   Language Auditory comprehension;Verbal expression   Clinical Impression, SLP Eval   Criteria for Skilled Therapeutic Interventions Met Yes   SLP Diagnosis Severe receptive and expressive aphasia   Functional limitations due to impairments Inability to communicate his wants and needs effectively.    Rehab Potential Good, to achieve stated therapy goals   Therapy Frequency Daily   Predicted Duration of Therapy Intervention (days/wks) 1 week   Anticipated Discharge Disposition Transitional Care Facility   Risks  and Benefits of Treatment have been explained. Yes   Patient, Family & other staff in agreement with plan of care Yes   Clinical Impression Comments Patient scored a 41.67 on the WAB-R indicating severe aphasia. He was able to follow 1-step directions with 80%, but unable to complete 2 step directions. Severe deficits for verbal language for confrontational naming of common objects. He was unable to name or describe what was happening in a picture, but was able to state his name and address.    Total Evaluation Time      Total Evaluation Time (Minutes) 20

## 2020-04-20 NOTE — PROGRESS NOTES
CM    I: HUBER consult for discharge planning acknowledged. Code stroke called for pt this morning; awaiting pt stabilization.    P: Continue to assist as needed.    ROYA Calles   [facility]  [phone]

## 2020-04-20 NOTE — PLAN OF CARE
4/20/2020 [id number]    Patient here with stroke vs seizure. A&Ox1-2, not time or situation. [place], macular degeneration. Neuros intact ex slow and deliberate finger-to-nose. Patient had seizure vs stroke activity and a code stroke was called around 10 am. Patient had CT scan which showed no new findings, neurology following. During activity patient did not respond to questions and did not use right side. Dd2, thin liquid diet. VSS ex BP soft this morning. Tele: ST with BBB and PVC. Up A1 GB/W to bathroom. Scoring green on aggression stop light tool. Echocardiogram planned. Continue to monitor.

## 2020-04-20 NOTE — CONSULTS
Consult Date:  04/20/2020      NEUROLOGY CONSULTATION       CHIEF COMPLAINT:  Evaluate for recurrent episodes of aphasia.      HISTORY OF PRESENT ILLNESS:  Mr. Dustin Nash is 89 years old who was admitted to the hospital numerous times for the episodes of aphasia.  He has prior history of left MCA stroke.  He normally sees Dr. 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  He is on Keppra and lacosamide.  He was admitted to the hospital this time to rule out stroke.  Evaluation was negative.  This morning prior to General Neurology consultation, he had another episode of aphasia and another code stroke was called and no evidence of a stroke was diagnosed.      The patient does have left hemispheric stroke with aphasia.  He has presentation with frequent spells with trouble with speaking.  He did have EEG video monitoring in 03/2020 and in 08/2019 with no active status epilepticus.  He was managed with Keppra, and lacosamide was started at 50 mg twice a day.      The patient is not a very good historian.  He does admit he has trouble speaking.  Overall, he is here otherwise at the baseline with the exception of 2 episodes of difficulty with language.       I did review patient's chart to obtain further details of history.      PAST MEDICAL:  Patient has a history of ventricular tachycardia, previous stroke, paroxysmal atrial fibrillation, peripheral artery disease, osteopenia, coronary artery disease, kidney stones, cardiomyopathy, dyslipidemia, diabetes type 2, depression, hypertension, aortic stenosis, abdominal aneurysm.      SOCIAL HISTORY:  He lives at home with his spouse.      FAMILY HISTORY:  Positive for heart disease.      ALLERGIES:  OXYCODONE, SULFA AND TETRACYCLINE.      ADMISSION MEDICATIONS:  Baby aspirin, atorvastatin, Cymbalta, insulin, lacosamide 50 mg twice a day,  Keppra 750 mg twice a day, tamsulosin and Brilinta.      REVIEW OF ORGAN SYSTEMS:  Cannot be fully  obtained from the patient.  I did review his chart showing normal electrolytes, normal CBC.  CT of the head and echo are reviewed and are as well unremarkable.      PHYSICAL EXAMINATION:   VITAL SIGNS:  The patient with a blood pressure of 145/66, temperature 97.3, heart rate 67, respiratory rate 18.   GENERAL:  The patient is in no acute distress, appears to be somewhat confused and not well following commands.   LUNGS:  Clear to auscultation bilaterally.   CARDIOVASCULAR:  S1, S2 cardiac sounds with systolic ejection murmur.  Heart rate currently is regular.   ABDOMEN:  Soft, nontender.  Pedal pulses are present.   EXTREMITIES:  No evidence of pedal edema.   NEUROLOGIC:  The patient demonstrated signs of cognitive impairment.  He is slow to answer, sometimes answers are not correct.  He also probably is hard of hearing.  Speech is clear and he does answer with simple sentences.  When answers are correct, the sentences are correct in grammar.  I do not see any gross evidence of aphasia.  Overall, vocabulary and word output are diminished.      Extraocular eye movements in the full range.  Pupils are poorly reactive to light.  Face is symmetrical.  The patient moves both upper and lower extremities against gravity.  Reflexes are hypoactive in arms and knees.   Ankle jerks are absent.  Toes are downgoing.  Coordination is intact to finger-nose-finger.  I am not able to perform the reminder of the examination.      IMPRESSION:  This gentleman presents with recurrent episodes of aphasia, cause of which is unclear.  He does have advanced age, prior history of large stroke.  He does not seem to be that he has active stroke and it does not seem to me that he is actually having active seizures.  He has been treated so far for presumed seizures secondary to structural lesion due to stroke.  At this point, the patient is close to baseline.  I did review his medications and I do suggest to give him the benefit of a doubt and  increase 1 of the anti-seizure medications.  I do recommend to increase lacosamide from 50 mg twice a day to 100 mg twice a day and keep unchanged dose of Keppra of 750 mg twice a day.      At this point, I do not think that the patient requires EEG monitoring.  We would presume that he does have risk factors for seizure disorder due to prior stroke and will try to optimize his medications.      On neurological opinion, the patient can be discharged home at least by tomorrow with increased dose of lacosamide and Keppra and he can follow up with his neurologist, Dr. Chaney.  I do not think any additional acute interventions are needed.      I am not entirely sure the cause of his episodes of aphasia as in addition to the potential diagnosis of seizure due to structural lesion.  This may be representing just a seizure and advanced age as well as possible hypoperfusion of the brain.        At this point, overall, patient appears to be stable.  Again, we will try to optimize anticonvulsants I do not pursue any additional evaluations at this point and hopefully discharge home at least by tomorrow.      Please call if additional neurological followup is needed.         MITESH REED MD             D: 2020   T: 2020   MT: NORMA      Name:     SID AGUIAR   MRN:      [id number]        Account:       [id number]   :      1928           Consult Date:  2020      Document: [id number]

## 2020-04-20 NOTE — PLAN OF CARE
Discharge Planner OT   Patient plan for discharge: if needed TCU  Current status: Eval completed and treatment initiated. Pt lives in a townhouse with SO. Pt reports I with ADL's and functional mobility with SEC. Pt reports his SO helps with med mgmt and sponge bathes. Pt admitted due to bilateral leg weakness and difficulty speaking 2 hours prior to presentation. MRI and CT (-).   Pt alert and oriented to self, , place and current month. Pt needed reorientation to current year and situation. Sit <> stand with SBA/cGA with cues for ww safety with FWW. Pt reports feeling woozy, BPsoft, see vital sign flow sheet. Pt was seated at EOB with no LOB after sitting again, pt appeared to have posterior LOB needing close SBA/CGA. Pt ambulated to chair with CGA and FWW and wozziness decreased. CGA/min A for LE ADL's.  Barriers to return to prior living situation: lightheadedness, decreased safety, balance, et  Recommendations for discharge: TCU  Rationale for recommendations: Pt requires increased A with ADLs and functional mobility and Pt will requires daily therapy to increase ADL and functional mobility independence and safety to PLOF. If pt's SO able to A pt with ADLs/IADL's with 24 hr A then pt could return home with home OT for ADL/IADL's and home safety.       Entered by: Jacque Kim 2020 9:18 AM

## 2020-04-20 NOTE — PLAN OF CARE
DATE & TIME: 4/19/2020 3-11pm shift    Cognitive Concerns/ Orientation : orientedx3- forgetful with word finding difficulty- tingling in left fingers pt states has been off and on with diabetes. Neuro q 4hr is intact  BEHAVIOR & AGGRESSION TOOL COLOR: green  CIWA SCORE: N/A   ABNL VS/O2: VSS on RA  MOBILITY: up a-1 with walker and GB, BL uses a cane, PT consulted recommends home with walker  PAIN MANAGMENT: denies pain  DIET: DD2 thin liquids, no straw  BOWEL/BLADDER: continent with urinal  ABNL LAB/BG: BGM:119, 159, echo with 30-35% EF in march. MRI completed- results pending, if negative to do EEG and reconsult neurology.  DRAIN/DEVICES: PIV changed to SL  TELEMETRY RHYTHM: NSR BBB  SKIN: dry, scabs  TESTS/PROCEDURES: MRI, possibly EEG  D/C DAY/GOALS/PLACE: pending workup  OTHER IMPORTANT INFO: wife would like updates on changes and results via cell phone number in chart

## 2020-04-20 NOTE — PLAN OF CARE
Pt here with stroke vs seizure. Alert, disorientated to place and time. [place], blurry vision due to macular degeneration. Neuros intact, except confusion. VSS. Tele SR with BBB and PVC. DD2  diet, thin liquids. Takes pills whole. Up with assist x1, GB and walker. Denies pain. Pt scoring yellow on the Aggression Stop Light Tool, verbally aggressive at times, confused. Plan possible EEG today. Discharge pending.

## 2020-04-20 NOTE — CODE/RAPID RESPONSE
"[facility]    CODE STROKE NOTE  4/20/2020   Time Called RRT: Not called  Time Called Code Stroke 10: 30 AM    Neuro:  Expressive aphasia, not new  Time:  9: 15 AM  tPA: Contraindicated due to recent stroke   Glucose level: 200     Physical Exam   Vital Signs with Ranges:  Temp:  [97.4  F (36.3  C)-98.1  F (36.7  C)] 97.4  F (36.3  C)  Pulse:  [67] 67  Heart Rate:  [] 98  Resp:  [16-18] 18  BP: ()/(40-66) 120/66  SpO2:  [93 %-96 %] 95 %  I/O last 3 completed shifts:  In: 240 [P.O.:240]  Out: 725 [Urine:725]    Assessment & Plan   Aphasia, intermittently expressive  Code Stroke called for concern of right sided weakness and aphasia. Upon my arrival patient answers questions with \"I don't know,\" including when shown common objects. No eye contact. Extremities strong, no obvious pronator drift. He follows commands quite consistently. Per EHR has had similar episodes over the past several days and has history of seizures. MRI last night without obvious acute pathology. Discussed with Neurology NP- will do non-contrast head CT and he is not a tPA candidate given recent stroke. During CT aphasia improved slightly and he was appropriately answer questions.     Per EHR review he has mild tachycardia today as compared to yesterday.     INTERVENTIONS:  - head CT, non-contrast   - General Neurology consult as previously ordered   - close monitoring of vital signs     Discussed with and defer further cares to Dr. Trevino, Hospitalist.     Code Status: Full Code    Sarah Donato, ASPEN, CNP  Hospitalist Service, House Officer  [facility]     Text Page  Pager: [phone]    Allergies   Allergies   Allergen Reactions     Oxycodone Other (See Comments)     \"TERRIBLE SWEATING\"     Sulfa Drugs      Tetracycline      Constitutional: 89- year old male laying in bed without obvious acute distress.   Pulmonary: He is not hypoxic. No obvious acute respiratory distress. " "  Cardiovascular: He appear well perfused. Warm.   Skin/Integumen: No obvious concerning rashes or lesions.   Psych:  Calm, cooperative.   Extremities: Moves all extremities.    Neuro: Awake, alert, unable to determine orientation as all asked questions answered with \"I don't know.\" No pronator drift, moves all extremities strongly. No obvious focal deficit.     Troponin:    Recent Labs   Lab Test 04/19/20  0720  05/27/18  0119   TROPI 0.034   < >  --    TROPONIN  --   --  0.00    < > = values in this interval not displayed.       IMAGING: (X-ray/CT/MRI)   Recent Results (from the past 24 hour(s))   MR Brain w/o & w Contrast    Narrative    MRI BRAIN WITHOUT AND WITH CONTRAST  4/19/2020 8:37 PM    HISTORY:  Confusion and difficulty speaking.     TECHNIQUE:  Multiplanar, multisequence MRI of the brain without and  with 5 mL Gadavist.    COMPARISON: 3/7/2020    FINDINGS: Diffusion-weighted images are normal. There is no evidence  for acute infarct or acute hemorrhage. There is a moderate sized old  infarct in the left frontal lobe with some cystic encephalomalacia.  Moderate cerebral atrophy is present. There are patchy white matter  changes in both hemispheres without mass effect. There is also  abnormal signal intensity in the brainstem without mass effect or  enhancement. A few tiny old linear cerebellar infarcts are also noted  in the right hemisphere. There is absent flow void in the distal left  internal carotid artery consistent with occlusion or proximal  stenosis. The right internal carotid artery and basilar artery are  patent at the skull base. Findings have not appreciably changed since  prior exam.      Impression    IMPRESSION:  1. No evidence for intracranial hemorrhage, acute infarct, or any  focal mass lesions.  2. 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  3. Moderate cerebral artery with moderately extensive brainstem and  white matter signal abnormalities most likely due to chronic small  vessel ischemic disease. "   4. A few tiny old right cerebellar infarcts also noted.    RACHELLE STANFORD MD       CBC with Diff:  Recent Labs   Lab Test 04/20/20  0901 04/18/20 1944   WBC 8.8 8.1   HGB 12.2* 11.9*   MCV 90 90    228   INR  --  1.08      Comprehensive Metabolic Panel:  Recent Labs   Lab 04/18/20 1944      POTASSIUM 4.3   CHLORIDE 102   CO2 28   ANIONGAP 8   *   BUN 17   CR 0.89   GFRESTIMATED 74   GFRESTBLACK 86   ALLISON 9.1       INR:    Recent Labs   Lab Test 04/18/20 1944   INR 1.08     Time Spent on this Encounter   I spent 45 minutes on the unit/floor managing the care of Dustin Pearce. Over 50% of my time was spent counseling the patient and/or coordinating care regarding services listed in this note.

## 2020-04-20 NOTE — PLAN OF CARE
PT:  Discharge Planner PT   Patient plan for discharge: Return home  Current status: Pt performed bed mobility with SBA, sit to/from stand with FWW and CGA. Pt had much difficulty with ambulation demonstrating R leaning, L veering, crossing his feet at times and an inability to correct his balance. Pt amb 50 ftx2+75 ft with min-mod A and eventually needed to sit down for safety. Attempted standing balance tasks and pt having much difficulty with this as well, unable to correct his deficits.  Barriers to return to prior living situation: High falls risk, needs assist with all mobility, poor ambulation.  Recommendations for discharge: TCU  Rationale for recommendations: Pt is unsafe to return home due to his mobility, safety and inability to correct his balance deficits. Pt would benefit from continued PT while admitted and at discharge to improve strength, balance, mobility to increase independence, reduce falls risk and increase safety before returning home.       Entered by: Meseret Yarbrough 04/20/2020 4:35 PM

## 2020-04-20 NOTE — PROGRESS NOTES
04/20/20 0844   Quick Adds   Type of Visit Initial Occupational Therapy Evaluation   Living Environment   Lives With significant other   Living Arrangements house  (townhouse)   Home Accessibility stairs to enter home;stairs within home   Number of Stairs, Main Entrance 2   Number of Stairs, Within Home, Primary 7   Transportation Anticipated family or friend will provide   Self-Care   Equipment Currently Used at Home cane, straight;grab bar, tub/shower   Activity/Exercise/Self-Care Comment tub with grab bar and reportsjust got bath bench.    Functional Level   Ambulation 1-->assistive equipment  (uses SEC mainly)   Transferring 1-->assistive equipment   Toileting 1-->assistive equipment   Bathing   (report sponge bathes)   Fall history within last six months yes   Number of times patient has fallen within last six months 5   General Information   Onset of Illness/Injury or Date of Surgery - Date 04/19/20   Referring Physician Benoit Trevino, DO    Patient/Family Goals Statement appears open to TCU if needed   Additional Occupational Profile Info/Pertinent History of Current Problem Mr. Pearce is a 89-year-old male with a past medical history significant for previous stroke, possible seizure disorder, ischemic cardiomyopathy, diabetes, who presents  Difficulty speaking and possible seizure-like activity. MRI and CT (-) for infarct   Precautions/Limitations fall precautions   Cognitive Status Examination   Orientation person;place  (confused regarding current year and situation. )   Level of Consciousness alert   Follows Commands (Cognition) WFL   Visual Perception   Visual Perception Wears glasses   Visual Perception Comments Pt is legally blind due to macular degeneration.   Sensory Examination   Sensory Quick Adds No deficits were identified   Pain Assessment   Patient Currently in Pain No   Range of Motion (ROM)   ROM Comment B UE AROM WFL    Strength   Strength Comments B UE strength WFL, appears  symmetrical   Hand Strength   Hand Strength Comments B  appears WFL, symmetrical    Coordination   Coordination Comments No noted coordination, visual impairments may contribute due to legally blind.    Transfer Skill: Bed to Chair/Chair to Bed   Level of [place]: Bed to Chair contact guard   Assistive Device - Transfer Skill Bed to Chair Chair to Bed Rehab Eval rolling walker   Transfer Skill: Sit to Stand   Level of [place]: Sit/Stand contact guard   Assistive Device for Transfer: Sit/Stand rolling walker   Lower Body Dressing   Level of [place]: Dress Lower Body minimum assist (75% patients effort)   Grooming   Level of [place]: Grooming contact guard   Instrumental Activities of Daily Living (IADL)   Previous Responsibilities   (has A with med mgmt from Son.)   Activities of Daily Living Analysis   Impairments Contributing to Impaired Activities of Daily Living balance impaired;cognition impaired;strength decreased  (baseline is legally blind)   General Therapy Interventions   Planned Therapy Interventions ADL retraining;cognition;transfer training;home program guidelines;progressive activity/exercise   Clinical Impression   Criteria for Skilled Therapeutic Interventions Met yes, treatment indicated   OT Diagnosis impaired I and safety with ADL'sa nd functional mobility   Influenced by the following impairments impaired balance, safety, cognitoin/STM, activity tolerance, lightheadedness   Assessment of Occupational Performance 3-5 Performance Deficits   Identified Performance Deficits impaired I with dressing, toilet and shower transfer, etc.    Clinical Decision Making (Complexity) Moderate complexity   Therapy Frequency Daily   Predicted Duration of Therapy Intervention (days/wks) 4 days   Anticipated Discharge Disposition Transitional Care Facility   Risks and Benefits of Treatment have been explained. Yes   Patient, Family & other staff in agreement with plan of care Yes   Konstantin  "[employer] [facility]  \"6 Clicks\" Daily Activity Inpatient Short Form   1. Putting on and taking off regular lower body clothing? 3 - A Little   2. Bathing (including washing, rinsing, drying)? 3 - A Little   3. Toileting, which includes using toilet, bedpan or urinal? 3 - A Little   4. Putting on and taking off regular upper body clothing? 3 - A Little   5. Taking care of personal grooming such as brushing teeth? 3 - A Little   6. Eating meals? 4 - None   Daily Activity Raw Score (Score out of 24.Lower scores equate to lower levels of function) 19   Total Evaluation Time   Total Evaluation Time (Minutes) 10     "

## 2020-04-20 NOTE — PROGRESS NOTES
[facility]    Medicine Progress Note - Hospitalist Service       Date of Admission:  4/18/2020  Assessment & Plan      Weakness and word finding difficulties  R/o stroke and seizure  Known chronic left ICA occlusion  Patient had bilateral leg weakness and difficulty speaking 2 hours prior to presentation  Code stroke called in the ED  CT revealed no new infarcts: old left frontal nfarct and altered perfusion in the posterior left hemisphere, chronically occluded left ICA, and moderate stenosis in the left P2 segment  Patient has seizure history as well, recent admission on 3/2020 with recurrent facial droop, slurred speech, and weakness with negative stroke workup  MRI with no stroke  Most likely his episodes reflect recurrent seizure?  Plan  - stroke neuro rec general neuro, so they are consulted  - resume home anti-hypertensive agents  - aspirin and ticagrelor  - PT/OT/SLP  - would repeat TTE given the EKG changes but he currently denies any cardiopulmonary complaints  - resume atorvastatin    CAD with ischemic CM  History of MODESTA > 1 year ago, but remains on DAPT  Follows with Dr. Samuel at [facility]  Last echo 3/2020 with 30-35% EF, moderate global hypokinesis with discrete regional WMAs.   No report of cardiopulmonary symptoms on admission or after  EKG on admission reveals SB with inferolateral TWI  Repeat the following AM reveals possible inferior STD  - aspirin and ticagrelor  - obtain an echocardiogram  - TTE pending. He has a new reduction in EF< so needs close cardiology follow-up. He can do this as outpatient. He denies any cardiopulmonary complaints. His wife denies noting any either.    DM2  8.3 A1c 1 month ago  - sliding scale npo insulin for now  - med rec needed    BPH  GERD  Anxiety and depression  - continue  Home meds        Diet: Combination Diet Dysphagia Diet Level 2: Mechan Altered; Thin Liquids (water, ice chips, juice, milk gelatin, ice cream, etc); No Straws    DVT  Prophylaxis: Pneumatic Compression Devices  Luis Catheter: not present  Code Status: Full Code      Disposition Plan   Expected discharge: 2 - 3 days, recommended to prior living arrangement once workup complete.  Entered: Benoit Trevino DO 04/20/2020, 2:35 PM       The patient's care was discussed with the Patient.    Benoit Trevino DO  Hospitalist Service  [facility]    ______________________________________________________________________    Interval History   Had another episode of aphasia left sided facial droop and difficulty speaking so I had the nurse call a code stroke. This was de-escalated.     He has no concerns for me. Does not remember what happened. Wants me to call his partner deana    Data reviewed today: I reviewed all medications, new labs and imaging results over the last 24 hours. I personally reviewed no images or EKG's today.    Physical Exam   Vital Signs: Temp: 97.3  F (36.3  C) Temp src: Oral BP: (!) 145/66   Heart Rate: 83 Resp: 18 SpO2: 96 % O2 Device: None (Room air)    Weight: 129 lbs 6.4 oz  GENERAL:  The patient is lying in bed and appears comfortable.   HEENT:  Pupils equal and reactive to light, extraocular muscle function intact.  No scleral icterus.  Oropharynx is clear.   CARDIOVASCULAR:  Heart is regular rate and rhythm without any murmur, rub or gallop.   PULMONARY:  Lungs are clear to auscultation bilaterally without wheeze or rhonchi.   GASTROINTESTINAL:  Positive bowel sounds, soft, nontender and nondistended.   PSYCHIATRIC:  The patient is alert and oriented to self.  He does answer questions appropriately, but seems quite hard of hearing.   NEUROLOGIC:  Cranial nerves II-XII are grossly intact.  No focal muscular weakness of the upper/lower extremities as strenght 5/5. Left sided facial droop present?    Data   Recent Labs   Lab 04/20/20  0901 04/19/20  0720 04/19/20  0429 04/19/20  0002 04/18/20  1944   WBC 8.8  --   --   --  8.1   HGB 12.2*   --   --   --  11.9*   MCV 90  --   --   --  90     --   --   --  228   INR  --   --   --   --  1.08   NA  --   --   --   --  138   POTASSIUM  --   --   --   --  4.3   CHLORIDE  --   --   --   --  102   CO2  --   --   --   --  28   BUN  --   --   --   --  17   CR  --   --   --   --  0.89   ANIONGAP  --   --   --   --  8   ALLISON  --   --   --   --  9.1   GLC  --   --   --   --  292*   TROPI  --  0.034 0.035 0.042  --      Recent Results (from the past 24 hour(s))   MR Brain w/o & w Contrast    Narrative    MRI BRAIN WITHOUT AND WITH CONTRAST  4/19/2020 8:37 PM    HISTORY:  Confusion and difficulty speaking.     TECHNIQUE:  Multiplanar, multisequence MRI of the brain without and  with 5 mL Gadavist.    COMPARISON: 3/7/2020    FINDINGS: Diffusion-weighted images are normal. There is no evidence  for acute infarct or acute hemorrhage. There is a moderate sized old  infarct in the left frontal lobe with some cystic encephalomalacia.  Moderate cerebral atrophy is present. There are patchy white matter  changes in both hemispheres without mass effect. There is also  abnormal signal intensity in the brainstem without mass effect or  enhancement. A few tiny old linear cerebellar infarcts are also noted  in the right hemisphere. There is absent flow void in the distal left  internal carotid artery consistent with occlusion or proximal  stenosis. The right internal carotid artery and basilar artery are  patent at the skull base. Findings have not appreciably changed since  prior exam.      Impression    IMPRESSION:  1. No evidence for intracranial hemorrhage, acute infarct, or any  focal mass lesions.  2. 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  3. Moderate cerebral artery with moderately extensive brainstem and  white matter signal abnormalities most likely due to chronic small  vessel ischemic disease.   4. A few tiny old right cerebellar infarcts also noted.    RACHELLE STANFORD MD   CT Head w/o Contrast    Narrative    CT SCAN OF THE  HEAD WITHOUT CONTRAST   4/20/2020 10:53 AM     HISTORY: Follow-up, do not need full stroke imaging.    TECHNIQUE:  Axial images of the head and coronal reformations without  IV contrast material. Radiation dose for this scan was reduced using  automated exposure control, adjustment of the mA and/or kV according  to patient size, or iterative reconstruction technique.    COMPARISON: Head MRI 4/19/2020, head CT 4/18/2020    FINDINGS:  No appreciable change. Moderate parenchymal volume loss is  present. Old left frontal opercular infarct is present. Background of  patchy confluent white matter hypoattenuation likely represents  chronic small vessel ischemic change. Small old cerebellar infarcts  are present. No evidence of acute ischemia, hemorrhage, mass, mass  effect, or hydrocephalus. The visualized calvarium, tympanic cavities,  mastoid cavities, and paranasal sinuses are unremarkable.      Impression    IMPRESSION:  Chronic small vessel ischemic change and multiple old  infarcts. No appreciable change.    LEATHA LUNDY MD     Medications       sodium chloride 0.9 %  100 mL Intravenous Once     aspirin  81 mg Oral Daily     atorvastatin  40 mg Oral Daily     DULoxetine  30 mg Oral Daily     insulin aspart  1-7 Units Subcutaneous TID AC     insulin aspart  1-5 Units Subcutaneous At Bedtime     iopamidol  120 mL Intravenous Once     lacosamide  100 mg Oral BID     levETIRAcetam  750 mg Oral BID     omeprazole  40 mg Oral QAM     sodium chloride (PF)  3 mL Intracatheter Q8H     tamsulosin  0.4 mg Oral Daily     ticagrelor  90 mg Oral BID

## 2020-04-20 NOTE — CONSULTS
"[facility]    Stroke Telephone Note    I was called by Sarah Madrigal CNP on 04/20/20 at 1026 regarding patient Dustin Pearce. The patient is a 89 year old male with past medical history significant for chronic L ICA occlusion, L MCA territory infarct, HTN, HLD, DM2, and recurrent spells of speech difficulty (TIA vs seizure) who was admitted for an episode of word finding difficulty. Subsequent stroke evaluation was unrevealing. This morning the patient had another episode of aphasia and decreased level of consciousness consistent with his prior episodes. He is not a tPA candidate due to stroke in the last 3 months. CTH was negative for acute pathology.    Stroke Code Data  (for stroke code without tele)  Stroke code activated 04/20/20   1026   First stroke provider response 04/20/20   1027   Last known normal 04/20/20   0950   Time of discovery   (or onset of symptoms) 04/20/20   0955   Head CT read by me 04/20/20   1053   Was stroke code de-escalated? Yes 04/20/20 1036  presence of contraindications for both intravenous and intra-arterial stroke treatments       TPA Treatment   Not given due to recent stroke.    Endovascular Treatment  Not initiated due to absence of proximal vessel occlusion    Impression  Likely seizure, consistent with prior episodes of speech difficulty    Recommendations  - Further evaluation per general neurology    ASPEN Townsend, CNP  Neurology  04/20/2020 6:11 PM  To page stroke neurology after hours or on a subsequent day, click here: AMCOM  Choose \"On Call\" tab at top, then search dropdown box for \"Neurology Adult\" & press Enter, look for Neuro ICU/Stroke            "

## 2020-04-21 NOTE — PROGRESS NOTES
Care Coordination:    Dr. Collado called to request pt see his primary cardiology team in 1 week due to reduced ejection fraction.  In discussion with SW anticipate pt will still be a TCU in 1 week.  This patient has aphasia, dysphasia, and cognitive barriers to doing the virtual / phone followup visits that are the heart clinic practices due to the current pandemic situation.  I contacted [facility] to see how they are assisting pt with virtual and phone visits.  They advise having the heart clinic contact them at [phone] and they will figure out how to assist pt with the virtual or phone visit.     Called [facility] to request appointment ([phone])    Scheduled:     May 01, 2020  8:10 AM CDT   Telephone Visit with Janis Greene PA-C   [facility] ([facility])  [address]   [place] [id number] [phone] [address]    [facility]  Note: this is not an onsite visit; there is no need to come to the facility.  Please have a list of all current medications available for appointment.      And added additional discharge instructions to AVS:     You are discharging to: [facility]  Address: [address] [place]  [address]  Phone: [phone]     A heart clinic follow-up appointment was scheduled for you  DUE TO COVID-19 PANDEMIC, MANY CLINICS ARE TEMPORARILY NOT TAKING IN-PERSON APPOINTMENTS.  Your clinic was contacted by a hospital care coordinator, and a request was made for a phone or visit--please answer your phone. If you develop any symptoms or have any followup questions please call your primary care clinic. If it is an emergency, please dial 911.      It is very important to check in with your provider--during the phone visit, talk about your hospital stay.  Tell the clinic provider how you feel.  Talk about the medications listed on the discharge papers.  " Your doctor will update records, make sure you are still doing OK, and decide if any tests or medication changes are needed.       NOTE: THE HEART CLINIC WOULD LIKE THIS APPOINTMENT TO BE A VIRTUAL VISIT.  IF PATIENT HAS A CELL PHONE PLEASE ASSIST HIM IN SETTING UP THE VIRTUAL APPOINTMENT TEMO.  THE HEART CLINIC WAS GIVEN FLY'S PHONE NUMBER [phone] AND MAY CALL THIS NUMBER AT THE APPOINTMENT TIME--A TCU STAFF MEMBER MAY NEED TO BE PRESENT WITH PATIENT DUE TO HIS APHASIA, DYSPHAGIA, AND COGNITION.      Additionally provided patient copy of CMS BPCI \"Bundled Payments for Care Improvement Advanced\" letter.     Elda Edwards RN, BSN, PHN  [facility] [place] Care Coordination  [facility]   Mobile: [phone]    "

## 2020-04-21 NOTE — PROVIDER NOTIFICATION
"MD Notification    Notified Person: MD    Notified Person Name: Dr. schulte    Notification Date/Time: 4/21/2020 at 0645    Notification Interaction: web-based page     Purpose of Notification: \"704 - RF - patient slightly retaining overnight. Latest . Do you want to order straight cath orders if >400 vs 300? No prior hx retention Thanks, Chelita *[id number]\"    Orders Received:    Comments:    "

## 2020-04-21 NOTE — DISCHARGE SUMMARY
[facility]    Discharge Summary  Hospitalist    Date of Admission:  4/18/2020  Date of Discharge:  4/21/2020  Discharging Provider: Smith Collado MD    Discharge Diagnoses      Weakness and word finding difficulties-likely due to recurrent seizure.  Recurrent seizure.  Known chronic left ICA occlusion  CAD with ischemic CM  DM2  BPH  GERD  Anxiety and depression     Hospital Course:    Weakness and word finding difficulties  R/o stroke and seizure  Known chronic left ICA occlusion  Patient had bilateral leg weakness and difficulty speaking 2 hours prior to presentation  -Code stroke called in the ED  -CT revealed no new infarcts: old left frontal nfarct and altered perfusion in the posterior left hemisphere, chronically occluded left ICA, and moderate stenosis in the left P2 segment  -Patient has seizure history as well, recent admission on 3/2020 with recurrent facial droop, slurred speech, and weakness with negative stroke workup  -MRI with no stroke  -Most likely his episodes reflect recurrent seizure?  -neurology followed   -Dr Ponce recommended increasing vimpat to 100 mg BID with no further recommendation at this time.  -No further recurrence of symptoms overnight.  -resume home anti-hypertensive agents  -continue aspirin and ticagrelor  -continue atorvastatin     CAD with ischemic CM  -History of MODESTA > 1 year ago, but remains on DAPT  -Follows with Dr. Samuel at [facility]  -Last echo 3/2020 with 30-35% EF, moderate global hypokinesis with discrete regional WMAs.   -Current echo-with similar ejection fraction of 30-35% with mid to basal inferolateral akinesis-appears unchanged from 3/2020  -No report of cardiopulmonary symptoms, this was confirmed with spouse Halina on the phone  -EKG on admission reveals SB with inferolateral TWI  -Ruled out ACS with negative troponins, given lack of symptoms and ACS ruled out, recommended outpatient follow-up with  in 1 week.  Discussed with care  coordinator to set up appointment with heart.  -continue aspirin and ticagrelor     DM2  8.3 A1c 1 month ago  -sliding scale npo insulin for now  -resume PTA regimen     BPH  GERD  Anxiety and depression  -continue  Home meds       Smith Collado MD    Significant Results and Procedures   See below    Pending Results     Unresulted Labs Ordered in the Past 30 Days of this Admission     No orders found from 3/19/2020 to 4/19/2020.          Code Status   Full Code       Primary Care Physician   Matt Morrissey    Physical Exam   Temp: 98  F (36.7  C) Temp src: Oral BP: 132/63   Heart Rate: 63 Resp: 16 SpO2: 97 % O2 Device: None (Room air)      Constitutional: AAOX2-3, NAD  Respiratory: CTA B/L, Normal WOB  Cardiovascular: RRR, No murmur  GI: Soft, Non- tender, BS- normoactive  Neuro: CN- grossly intact, Moving X4     Discharge Disposition   Discharged to short-term care facility  Condition at discharge: Stable    Consultations This Hospital Stay   NEUROLOGY IP CONSULT  SPEECH LANGUAGE PATH ADULT IP CONSULT  PHYSICAL THERAPY ADULT IP CONSULT  OCCUPATIONAL THERAPY ADULT IP CONSULT  NEUROLOGY IP CONSULT  CARE TRANSITION RN/SW IP CONSULT  NEUROLOGY IP CONSULT  PHYSICAL THERAPY ADULT IP CONSULT  OCCUPATIONAL THERAPY ADULT IP CONSULT    Time Spent on this Encounter   I, Smith Collado MD, personally saw the patient today and spent less than or equal to 30 minutes discharging this patient.    Discharge Orders      General info for SNF    Length of Stay Estimate: Short Term Care: Estimated # of Days <30  Condition at Discharge: Improving  Level of care:skilled   Rehabilitation Potential: Good  Admission H&P remains valid and up-to-date: Yes  Recent Chemotherapy: N/A  Use Nursing Home Standing Orders: N/A     Mantoux instructions    Give two-step Mantoux (PPD) Per Facility Policy Yes     Follow Up and recommended labs and tests    Follow up with Nursing home physician.    F/u neurology in 4 weeks  F/U primary  cardiologist Dr Samuel in 7-10 days     Activity - Up with nursing assistance     Full Code     Physical Therapy Adult Consult    Evaluate and treat as clinically indicated.    Reason:     Occupational Therapy Adult Consult    Evaluate and treat as clinically indicated.    Reason:     Fall precautions     Advance Diet as Tolerated    Follow this diet upon discharge: Orders Placed This Encounter      Combination Diet Dysphagia Diet Level 2: Mechan Altered; Thin Liquids (water, ice chips, juice, milk gelatin, ice cream, etc); No Straws       Discharge Medications   Current Discharge Medication List      CONTINUE these medications which have CHANGED    Details   Lacosamide (VIMPAT) 100 MG TABS tablet Take 1 tablet (100 mg) by mouth 2 times daily  Qty:      Associated Diagnoses: Seizure (H)         CONTINUE these medications which have NOT CHANGED    Details   acetaminophen (TYLENOL) 500 MG tablet Take 500-1,000 mg by mouth every 6 hours as needed for mild pain      aspirin EC 81 MG EC tablet Take 1 tablet (81 mg) by mouth daily  Qty: 30 tablet, Refills: 0    Associated Diagnoses: Transient cerebral ischemia, unspecified type      atorvastatin (LIPITOR) 40 MG tablet Take 40 mg by mouth daily      bisacodyl (DULCOLAX) 10 MG suppository Place 1 suppository (10 mg) rectally daily as needed for constipation  Qty: 4 suppository, Refills: 11    Associated Diagnoses: Constipation, unspecified constipation type      DULoxetine (CYMBALTA) 30 MG capsule TAKE 1 CAPSULE(30 MG) BY MOUTH DAILY  Qty: 90 capsule, Refills: 2    Associated Diagnoses: Polyneuropathy associated with underlying disease (H)      insulin glargine (LANTUS PEN) 100 UNIT/ML pen Inject 26 Units Subcutaneous At Bedtime    Comments: If Lantus is not covered by insurance, may substitute Basaglar at same dose and frequency.        ipratropium (ATROVENT) 0.03 % nasal spray INHALE 1 SPRAY IN EACH NOSTRIL EVERY 12 HOURS AS NEEDED  Qty: 30 mL, Refills: 11    Associated  Diagnoses: Runny nose      levETIRAcetam (KEPPRA) 750 MG tablet Take 1 tablet (750 mg) by mouth 2 times daily  Qty:      Associated Diagnoses: Seizure (H)      MAGNESIUM-OXIDE 400 (241.3 Mg) MG tablet TAKE 1 TABLET BY MOUTH TWICE DAILY  Qty: 180 tablet, Refills: 1    Associated Diagnoses: Constipation, unspecified constipation type      metFORMIN (GLUCOPHAGE) 1000 MG tablet TAKE 1 TABLET BY MOUTH TWICE DAILY WITH MEALS  Qty: 180 tablet, Refills: 0    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 If symptoms persist 5 minutes after 1st dose call 911.  Qty: 25 tablet, Refills: 0    Associated Diagnoses: Coronary artery disease involving native heart, angina presence unspecified, unspecified vessel or lesion type      omeprazole (PRILOSEC) 40 MG DR capsule TAKE 1 CAPSULE(40 MG) BY MOUTH DAILY 30 TO 60 MINUTES BEFORE A MEAL  Qty: 90 capsule, Refills: 0    Associated Diagnoses: Other acute gastritis without hemorrhage      polyethylene glycol (MIRALAX) packet Take 1 packet by mouth 2 times daily as needed for constipation      polyethylene glycol-propylene glycol (SYSTANE ULTRA) 0.4-0.3 % SOLN ophthalmic solution Place 2 drops into both eyes daily as needed for dry eyes      sennosides (SENOKOT) 8.6 MG tablet Take 2 tablets by mouth daily    Associated Diagnoses: Constipation, unspecified constipation type      tamsulosin (FLOMAX) 0.4 MG capsule TAKE 1 CAPSULE BY MOUTH DAILY  Qty: 90 capsule, Refills: 3    Associated Diagnoses: Benign non-nodular prostatic hyperplasia with lower urinary tract symptoms      ticagrelor (BRILINTA) 90 MG tablet Take 1 tablet (90 mg) by mouth 2 times daily  Qty: 180 tablet, Refills: 1    Associated Diagnoses: TIA (transient ischemic attack)     "  blood glucose monitoring (NO BRAND SPECIFIED) meter device kit Use to test blood sugar bid times daily or as directed.  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 order for DME Equipment being ordered: wheeled walker with hand breaks and seat  Qty: 1 each, Refills: 0    Associated Diagnoses: Ataxia      !! order for DME Equipment being ordered: True Matrix Blood Glucose meter.  Qty: 1 Act, Refills: 11    Associated Diagnoses: Type 2 diabetes mellitus with complication, with long-term current use of insulin (H)      TRUE METRIX BLOOD GLUCOSE TEST test strip USE TO TEST THREE TIMES DAILY OR AS DIRECTED  Qty: 300 strip, Refills: 0    Associated Diagnoses: Hypoglycemia       !! - Potential duplicate medications found. Please discuss with provider.        Allergies   Allergies   Allergen Reactions     Oxycodone Other (See Comments)     \"TERRIBLE SWEATING\"     Sulfa Drugs      Tetracycline      Data   Most Recent 3 CBC's:  Recent Labs   Lab Test 04/20/20  0901 04/18/20  1944 03/06/20  0051   WBC 8.8 8.1 9.9   HGB 12.2* 11.9* 12.1*   MCV 90 90 88    228 210      Most Recent 3 BMP's:  Recent Labs   Lab Test 04/18/20  1944 03/06/20  0014 03/05/20  1411    137 139   POTASSIUM 4.3 4.5 4.4   CHLORIDE 102 106 107   CO2 28 26 25   BUN 17 22 20   CR 0.89 0.87 1.08   ANIONGAP 8 5 7   ALLISON 9.1 9.1 9.6   * 181* 228*     Most Recent 2 LFT's:  Recent Labs   Lab Test 03/06/20  0014 08/25/19  0620   AST 26 15   ALT 19 13   ALKPHOS 73 68   BILITOTAL 0.6 0.4     Most Recent INR's and Anticoagulation Dosing History:  Anticoagulation Dose History     Recent Dosing and Labs Latest Ref Rng & Units 12/31/2017 4/8/2019 6/30/2019 7/16/2019 8/24/2019 3/5/2020 4/18/2020    INR 0.86 - 1.14 0.99 1.00 1.01 0.98 0.94 1.07 1.08        Most Recent 3 Troponin's:  Recent Labs   Lab Test 04/19/20  0720 04/19/20  0429 04/19/20  0002  05/27/18  0119   TROPI 0.034 0.035 0.042   " < >  --    TROPONIN  --   --   --   --  0.00    < > = values in this interval not displayed.     Most Recent Cholesterol Panel:  Recent Labs   Lab Test 03/06/20  0014   CHOL 146   LDL 69   HDL 52   TRIG 123     Most Recent 6 Bacteria Isolates From Any Culture (See EPIC Reports for Culture Details):  Recent Labs   Lab Test 05/26/18  2246 05/26/18  2135 03/12/18  1454 02/08/18  0218 06/06/17  1846 06/04/17  1027   CULT No growth No growth  No growth >100,000 colonies/mL  Enterococcus faecalis  * >100,000 colonies/mL  Enterococcus faecalis  *  >100,000 colonies/mL  Strain 2  Enterococcus faecalis  * No anaerobes isolated  No growth No anaerobes isolated  Moderate growth Staphylococcus aureus*     Most Recent TSH, T4 and A1c Labs:  Recent Labs   Lab Test 03/05/20  1159  06/03/18  0520   TSH  --   --  4.45*   T4  --   --  0.98   A1C 8.3*   < >  --     < > = values in this interval not displayed.       Results for orders placed or performed during the hospital encounter of 04/18/20   CT Head Perfusion w Contrast    Narrative    CT HEAD PERFUSION WITH CONTRAST 4/18/2020 7:49 PM     HISTORY: Code stroke. Difficulty with word finding and walking.    TECHNIQUE: Axial images were obtained through the brain with  intravenous contrast for CT brain perfusion imaging. 50 mL of  Isovue-370 was given. Radiation dose for this scan was reduced using  automated exposure control, adjustment of the mA and/or kV according  to patient size, or iterative reconstruction technique.    COMPARISON: 8/24/2019.    FINDINGS: There is an old infarct in the left frontal region. There is  some subtle altered perfusion in the posterior left cerebral  hemisphere that could be related to the known internal carotid artery  occlusion. There is no convincing evidence for any acute infarct.      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 Some altered perfusion is again  noted in the posterior left cerebral hemisphere similar to that seen  on  the prior exam, but this may just be related to the known left  internal carotid artery occlusion. If clinically indicated, MRI might  be helpful for further evaluation.    RACHELLE STANFORD MD   CT Head w/o Contrast    Narrative    CT SCAN OF THE HEAD WITHOUT CONTRAST   4/18/2020 7:35 PM     HISTORY: Code Stroke. Speech difficulties. Transit difficulty in  walking.    TECHNIQUE: Axial images of the head and coronal reformations without  IV contrast material. Radiation dose for this scan was reduced using  automated exposure control, adjustment of the mA and/or kV according  to patient size, or iterative reconstruction technique.    COMPARISON: 3/5/2020.    FINDINGS: Again seen is an old left frontal infarct. Moderate cerebral  atrophy and scattered white matter changes are also noted. There is no  evidence for intracranial hemorrhage, acute infarct, mass effect, or  skull fracture.      Impression    IMPRESSION: Chronic changes. No evidence for intracranial hemorrhage  or any acute process.    RACHELLE STANFORD MD   CTA Head Neck with Contrast    Narrative    CTA HEAD AND NECK WITH CONTRAST 4/18/2020 7:37 PM     HISTORY: Difficulty speaking and walking. Code stroke.    TECHNIQUE: Axial images were obtained through the head and neck with  intravenous contrast. 70 mL of Isovue-370 was given. Multiplanar  reconstructions were performed. 3-D reconstructions off of a remote  workstation for CT angiography were also acquired. Carotid stenoses  were evaluated by comparing the caliber of the proximal internal  carotid artery to the caliber of the distal internal carotid artery.  Radiation dose for this scan was reduced using automated exposure  control, adjustment of the mA and/or kV according to patient size, or  iterative reconstruction technique.    COMPARISON: 3/5/2020.    FINDINGS: There is heavy atherosclerotic calcification involving the  thoracic arch and proximal brachiocephalic vessels with approximately  60% stenosis of  the proximal left subclavian artery.    Right carotid system: Mild to moderate calcific plaque is seen in the  carotid bifurcation. There is no significant stenosis.    Left carotid system: There is chronic occlusion of the left internal  carotid artery.    Right vertebral artery: Minimal plaque. No stenosis or dissection.    Left vertebral artery: There is some calcific plaque near the origin.  There is no stenosis or dissection.    [place]: The internal carotid artery is occluded to the  carotid terminus on the left. The right distal internal carotid artery  is patent with some calcific plaque in the carotid siphon region, but  no stenosis. Both anterior cerebral arteries, middle cerebral  arteries, basilar artery, and posterior cerebral arteries are patent.  There is moderate stenosis of the left P2 segment of the posterior  cerebral artery. There is both a patent anterior communicating artery  and a left posterior communicating artery supplying the left middle  cerebral artery and left anterior cerebral artery. There is no  evidence for aneurysm.      Impression    IMPRESSION:  1. Chronically occluded left internal carotid artery.  2. Moderate stenosis of the left P2 segment which is in the 70th  percentile range.    RACHELLE STANFORD MD   MR Brain w/o & w Contrast    Narrative    MRI BRAIN WITHOUT AND WITH CONTRAST  4/19/2020 8:37 PM    HISTORY:  Confusion and difficulty speaking.     TECHNIQUE:  Multiplanar, multisequence MRI of the brain without and  with 5 mL Gadavist.    COMPARISON: 3/7/2020    FINDINGS: Diffusion-weighted images are normal. There is no evidence  for acute infarct or acute hemorrhage. There is a moderate sized old  infarct in the left frontal lobe with some cystic encephalomalacia.  Moderate cerebral atrophy is present. There are patchy white matter  changes in both hemispheres without mass effect. There is also  abnormal signal intensity in the brainstem without mass effect  or  enhancement. A few tiny old linear cerebellar infarcts are also noted  in the right hemisphere. There is absent flow void in the distal left  internal carotid artery consistent with occlusion or proximal  stenosis. The right internal carotid artery and basilar artery are  patent at the skull base. Findings have not appreciably changed since  prior exam.      Impression    IMPRESSION:  1. No evidence for intracranial hemorrhage, acute infarct, or any  focal mass lesions.  2. 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  3. Moderate cerebral artery with moderately extensive brainstem and  white matter signal abnormalities most likely due to chronic small  vessel ischemic disease.   4. A few tiny old right cerebellar infarcts also noted.    RACHELLE STANFORD MD   CT Head w/o Contrast    Narrative    CT SCAN OF THE HEAD WITHOUT CONTRAST   4/20/2020 10:53 AM     HISTORY: Follow-up, do not need full stroke imaging.    TECHNIQUE:  Axial images of the head and coronal reformations without  IV contrast material. Radiation dose for this scan was reduced using  automated exposure control, adjustment of the mA and/or kV according  to patient size, or iterative reconstruction technique.    COMPARISON: Head MRI 4/19/2020, head CT 4/18/2020    FINDINGS:  No appreciable change. Moderate parenchymal volume loss is  present. Old left frontal opercular infarct is present. Background of  patchy confluent white matter hypoattenuation likely represents  chronic small vessel ischemic change. Small old cerebellar infarcts  are present. No evidence of acute ischemia, hemorrhage, mass, mass  effect, or hydrocephalus. The visualized calvarium, tympanic cavities,  mastoid cavities, and paranasal sinuses are unremarkable.      Impression    IMPRESSION:  Chronic small vessel ischemic change and multiple old  infarcts. No appreciable change.    LEATHA LUNDY MD   Echocardiogram Limited    Narrative    [id number]  [id number]  [id number]  [id number]^MANDY^LEATHA^DOMINIC            [facility]  Echocardiography Laboratory  [address] [place] [address]        Name: SID AGUIAR  MRN: [id number]  : 1928  Study Date: 2020 11:53 AM  Age: 89 yrs  Gender: Male  Patient Location: [facility]  Reason For Study: CVA  Ordering Physician: LEATHA GALVAN  Referring Physician: LEATHA GALVAN  Performed By: [facility]     BSA: 1.6 m2  Height: 65 in  Weight: 129 lb  HR: 82  BP: 145/66 mmHg  _____________________________________________________________________________  __        Procedure  Limited Portable Bubble Echo Adult.  _____________________________________________________________________________  __        Interpretation Summary     A contrast injection (Bubble Study) was performed that was negative for flow  across the interatrial septum.  The visual ejection fraction is estimated at 30-35%.  Left ventricular systolic function is moderately reduced.  There are regional wall motion abnormalities as specified.  There is mild mitral stenosis.  Moderate valvular aortic stenosis.  The ascending aorta is Mildly dilated.  The study was technically difficult.  _____________________________________________________________________________  __        Left Ventricle  The left ventricle is normal in size. The visual ejection fraction is  estimated at 30-35%. Left ventricular systolic function is moderately reduced.  Left ventricular diastolic function is indeterminate. Mid to basal  inferolateral akinesis. Septal motion is consistent with conduction  abnormality. There is moderate global hypokinesia of the left ventricle. There  are regional wall motion abnormalities as specified.     Right Ventricle  The right ventricle is normal in size and function.     Atria  The left atrium is moderately dilated. Right atrial size is normal. Lipomatous  hypertrophy of the interatrial septum is noted. There is no color Doppler  evidence of an atrial shunt. A contrast  injection (Bubble Study) was performed  that was negative for flow across the interatrial septum.     Mitral Valve  The mitral valve leaflets are severely thickened. The mitral valve chordae are  thickened and/or calcified. There is severe mitral annular calcification.  There is trace mitral regurgitation. The mean mitral valve gradient is 4.6  mmHg. There is mild mitral stenosis.        Tricuspid Valve  There is trace tricuspid regurgitation. Right ventricular systolic pressure  could not be approximated due to inadequate tricuspid regurgitation.     Aortic Valve  No aortic regurgitation is present. The calculated aortic valve are is 1.3  cm^2. The peak AoV pressure gradient is 23.0 mmHg. The mean AoV pressure  gradient is 10.8 mmHg. Moderate valvular aortic stenosis.     Pulmonic Valve  The pulmonic valve is not well visualized.     Vessels  The aortic root is normal size. The ascending aorta is Mildly dilated. The  inferior vena cava was normal in size with preserved respiratory variability.     Pericardium  There is no pericardial effusion.        Rhythm  Sinus rhythm was noted.  _____________________________________________________________________________  __  MMode/2D Measurements & Calculations  asc Aorta Diam: 4.2 cm     LVOT diam: 2.0 cm  LVOT area: 3.2 cm2        Doppler Measurements & Calculations  MV max P.6 mmHg  MV mean P.6 mmHg  MV V2 VTI: 27.5 cm  MVA(VTI): 2.0 cm2  Ao V2 max: 240.1 cm/sec  Ao max P.0 mmHg  Ao V2 mean: 153.5 cm/sec  Ao mean PG: 10.8 mmHg  Ao V2 VTI: 43.1 cm  YULIYA(I,D): 1.3 cm2  YULIYA(V,D): 1.2 cm2  LV V1 max PG: 3.4 mmHg  LV V1 max: 91.8 cm/sec  LV V1 VTI: 17.7 cm  SV(LVOT): 55.7 ml  SI(LVOT): 34.0 ml/m2  AV Donnie Ratio (DI): 0.38  YULIYA Index (cm2/m2): 0.79           _____________________________________________________________________________  __           Report approved by: Dulce Maria Ramírez 2020 01:48 PM

## 2020-04-21 NOTE — PLAN OF CARE
Discharge Planner SLP   Patient plan for discharge: Not addressed.   Current status: Patient was seen for both swallowing and speech/language treatment sessions. Seen for a meal follow up session for diet tolerance of a DDL 2 with thin liquids. He was upright in bed feeding himself. His nurse reported good tolerance with medications. He demonstrated prolonged mastication of an omelet with mild oral residue after the swallow. He needed moderate verbal cues to check for oral residue and not to add more until oral cavity is cleared. Suspect some pharyngeal residue ater the swallow due to a cough x1. He tolerated 8 oz of thin liquids with premature entry and can not rule out penetration given a cough x1. Mix of the solid and thin liquids.   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 He is very [place] and auditory comprehension is impacted. He has difficulty expressing his thoughts and has frequent paraphasic errors in structured tasks and conversation.   Recommend: 1. Continue on the DDL 2 with thin liquids. 2. Up in the chair for meals, no straws, small bites/sips, check for oral residue, and alternate liquids/solids every few bites. 3.Continue daily speech/language treatment to improve functional communication.   Barriers to return to prior living situation: Dysphagia, aphasia and cognition.  Recommendations for discharge: TCU  Rationale for recommendations: Patient will benefit from continued ST needs for dysphagia management and speech/language deficits to improve functional communication. He is demonstrating progress from initial evaluation. He is below his baseline.     Speech Language Therapy Discharge Summary    Reason for therapy discharge:    Discharged to transitional care facility.    Progress towards therapy goal(s). See goals on Care Plan in [facility] electronic health record for goal details.  Goals not met.  Barriers to achieving goals:   discharge from facility.    Therapy  recommendation(s):    Continued therapy is recommended.  Rationale/Recommendations:  Continue ST needs for both dysphagia management and speech/language tx to improve functional communication. .           Entered by: Chloe Mclain 04/21/2020 10:35 AM

## 2020-04-21 NOTE — PROGRESS NOTES
A/Ox2, disoriented to year but knew the month, disoriented to place. VSS on RA. Ax1 with GB/W to BR. Tele NSR with PACs. /199. Seizure precautions maintained. Pt refused dinner, emesis x1 this shift zofran given x1, helpful. Neuros intact. Discharge pending progress.

## 2020-04-21 NOTE — CONSULTS
Care Transition Initial Assessment - SW     Met with: Family (via phone)  Active Problems:    Seizure (H)    Stroke (H)       DATA  Lives With: significant other   Living Arrangements: house([facility])     Description of Support System: Supportive, Involved  Who is your support system?: Significant Other, Children  Support Assessment: Adequate family and caregiver support, Adequate social supports.   Identified issues/concerns regarding health management: Need for increased services at time of discharge.  Transportation Anticipated: family or friend will provide    ASSESSMENT  Cognitive Status: Unable to assess; spoke directly to significant other d/t pt's current cognition  Concerns to be addressed: Discharge planning    SW reviewed chart and placed call to patient's significant other to discuss discharge planning. Pt was admitted 4/18/20 with Seiziure/Stroke. Anticipated discharge date: 4/21/20. SW introduced self and role. Significant other is well known to this SW, due to patient's recent admissions. Patient resides with S.O. in their [place], [place] home; S.O. reports she has been helping pt w/ADL's since his last TCU stay. We reviewed therapy recommendations for: TCU. S.O. stated she has been in contact w/patient's son who is in agreement w/TCU placement. Their greatest concern is for pt to be placed in a facility with no COVID 19 cases. Per [facility] admissions, they have no reported cases and have a male shared bed available today. S.O. in agreement w/[facility]; asking that pt be put on wait list for pvt room, which SW shared w/admissions.     PLAN  Financial costs for the patient includes: Possible transport costs, if applicable.  Patient given options and choices for discharge: Yes .  Patient/family is agreeable to the plan?  Yes  Transportation/person available to transport on day of discharge  is Transport Aide from [facility] and have they been set up for 1pm  Patient Goals and Preferences: Discharge to TCU  ([facility]) .  Patient anticipates discharging to:  [facility] .      Continue to assist as needed to ensure a safe discharge.     ROYA Calles   [facility]  [phone]    UPDATE@1036:  [facility] transport aide can take patient at 1pm today. RN paged physician for orders. Spouse reports she will drop off clothing for patient at [facility] sometime later.     UPDATE@1203: Orders, script and PAS were faxed to facility. No further SW interventions anticipated.    PAS-RR    D: Per DHS regulation, SW completed and submitted PAS-RR to [place] [employer] Direct Connect via the Senior LinkAge Line.  PAS-RR confirmation # is : [id number]    P: Further questions may be directed to Senior LinkAge Line at #[phone], option #4 for PAS-RR staff.

## 2020-04-21 NOTE — PLAN OF CARE
PT:  Discharge Planner PT   Patient plan for discharge: TCU  Current status: Pt performed supine to sit with SBA, sit to stand with HHA without AD. Pt began to lean heavily to the R and could not move his R foot to take a step. On second try, used FWW with CGA and able to move but still demonstrated R lean. Participated in seated LE strengthening and pt demonstrated difficulty with motor planning and completion of task on the R LE. Required multiple cues in various forms to replicate the movement desired. Pt resistant to leaving the room to walk as his lunch was going to come soon.  Barriers to return to prior living situation: Needs assist with all mobility, high falls risk, poor motor planning compromising safety.  Recommendations for discharge: TCU  Rationale for recommendations: Pt would benefit from continued PT while admitted and at discharge to improve strength, balance, mobility to increase independence, reduce falls risk and increase safety before returning home.       Entered by: Meseret Yarbrough 04/21/2020 12:18 PM     Physical Therapy Discharge Summary    Reason for therapy discharge:    Discharged to transitional care facility.    Progress towards therapy goal(s). See goals on Care Plan in [facility] electronic health record for goal details.  Goals not met.  Barriers to achieving goals:   discharge from facility.    Therapy recommendation(s):    Continued therapy is recommended.  Rationale/Recommendations:  eval and treat at TCU.

## 2020-04-21 NOTE — PLAN OF CARE
Discharge Planner OT   Patient plan for discharge: none stated  Current status:  pt required cues with assist for safety with hand placement and Oni sit to stands, amb with FWW cues for walker safety to/from bathroom, stood at sink for ADLS with CGA for balance, as pt fatigues pt has right lateral lean, cues needed to stand upright. Pt returned to sitting up in chair. Declined to participate with LE dressing or further ADLS at this time due to fatigue.   Barriers to return to prior living situation:  decreased safety, balance, et  Recommendations for discharge: TCU per plan established by the occupational Therapist  Rationale for recommendations: Pt requires increased A with ADLs and functional mobility and Pt will require daily therapy to increase ADL and functional mobility independence and safety to PLOF.          Entered by: Diana Hoffmann 04/21/2020 10:59 AM

## 2020-04-21 NOTE — PLAN OF CARE
Patient alert to self and place, [place], neuros intact, up with 1 assist GB/W, talked with patients wife and updated on patient status. Plan discharge to [facility] at 1300

## 2020-04-21 NOTE — DISCHARGE INSTRUCTIONS
You are discharging to: [facility]  Address: [address]  [address]  Phone: [phone]    A heart clinic follow-up appointment was scheduled for you  DUE TO COVID-19 PANDEMIC, MANY CLINICS ARE TEMPORARILY NOT TAKING IN-PERSON APPOINTMENTS.  Your clinic was contacted by a hospital care coordinator, and a request was made for a phone or visit--please answer your phone. If you develop any symptoms or have any followup questions please call your primary care clinic. If it is an emergency, please dial 911.      It is very important to check in with your provider--during the phone visit, talk about your hospital stay.  Tell the clinic provider how you feel.  Talk about the medications listed on the discharge papers.  Your doctor will update records, make sure you are still doing OK, and decide if any tests or medication changes are needed.      NOTE: THE HEART CLINIC WOULD LIKE THIS APPOINTMENT TO BE A VIRTUAL VISIT.  IF PATIENT HAS A CELL PHONE PLEASE ASSIST HIM IN SETTING UP THE VIRTUAL APPOINTMENT TEMO.  THE HEART CLINIC WAS GIVEN [facility]'S PHONE NUMBER [phone] AND MAY CALL THIS NUMBER AT THE APPOINTMENT TIME--A TCU STAFF MEMBER MAY NEED TO BE PRESENT WITH PATIENT DUE TO HIS APHASIA, DYSPHAGIA, AND COGNITION.

## 2020-04-21 NOTE — PLAN OF CARE
Discharge Planner SLP   Patient plan for discharge: Not addressed.   Current status: Patient seen for a meal follow up session for diet tolerance of a DDL 2 with thin liquids. He was upright in bed feeding himself. His nurse reported good tolerance with medications. He demonstrated prolonged mastication of an omelet with mild oral residue after the swallow. He needed moderate verbal cues to check for oral residue and not to add more until oral cavity is cleared. Suspect some pharyngeal residue ater the swallow due to a cough x1. He tolerated 8 oz of thin liquids with premature entry and can not rule out penetration given a cough x1. Mix of the solid and thin liquids.   Recommend: 1. Continue on the DDL 2 with thin liquids. 2. Upright, no straws, small bites/sips, check for oral residue, and alternate liquids/solids every few bites.   Barriers to return to prior living situation: Aphasia, dysphagia and cognition.   Recommendations for discharge: TCU  Rationale for recommendations: Patient will need continued ST needs for dysphagia and aphasia to return to baseline. He is below his baseline.        Entered by: Chloe Mclain 04/21/2020 9:44 AM

## 2020-04-22 NOTE — LETTER
To:             Please give to facility    From:   Farnaz Eddy RN, Care Coordinator   [facility] -Care Coordination  [facility]   E-mail [email or web address]   [phone]    Patient Name:  Dustin Pearce YOB: 1928   Admit date: 4/21/2020      *Information Needed:  Please contact me when the patient will discharge (or if they will move to long term care)- include the discharge date, disposition, and main diagnosis   - If the patient is discharged with home care services, please provide the name of the agency    Also- Please inform me if a care conference is being held.   Farnaz Eddy RN, Care Coordinator   [facility] -Care Coordination  [facility]   E-mail [email or web address]   [phone]                              Thank you

## 2020-04-22 NOTE — LETTER
4/22/2020        RE: Dustin Pearce  [address]  [place] [address]        [facility]  PRIMARY CARE PROVIDER AND CLINIC:  Matt Morrissey MD, [address] / [place] [address]  Chief Complaint   Patient presents with     Hospital F/U     [facility] Medical Record Number:  [id number]  Place of Service where encounter took place:  [facility]) [[id number]]    Dustin Pearce  is a 89 year old  (1/31/1928), admitted to the above facility from  [facility]. Hospital stay 4/18/2020 through 4/21/2020..  Admitted to this facility for  rehab, medical management and nursing care.    HPI:    HPI information obtained from: facility chart records, facility staff, patient report and [facility] chart review.   Brief Summary of Hospital Course:   89 yo male with hx recurrent seizures, CAD with ischemic CM, DM2, BPH, GERD and anxiety/depression hospitalized with bilat leg weakness and aphasia. CT: no new infarcts, old left frontal infarct and altered perfusion in the posterior left hemisphere, chronically occluded left ICA, and moderate stenosis in the left P2 segment. MRI negative. Neuro: episode most consistent with seizure, increased vimpat to 100 mg BID, continue BP meds, ASA and ticagrelor and statin. CAD: sees Dr Samuel [place] [facility], echo 3/2020 with 30-35% EF, moderate global hypokinesis with discrete regional WMAs and unchanged. See Dr Samuel one week. DM on insulin sliding scale. Other problems with meds as PTA.     Updates on Status Since Skilled nursing Admission:   Seen today for initial TCU visit. Reports some nausea today and had small emesis - this happens occasionally. Does not recall las BM but has good bowel sounds. Since admission note -131 and HR 49-71. Sats 97 % on room air. BG low 200s when checked    CODE STATUS/ADVANCE DIRECTIVES DISCUSSION:   DNR  Patient's living condition: lives with spouse  ALLERGIES: Oxycodone; Sulfa drugs;  and Tetracycline  PAST MEDICAL HISTORY:  has a past medical history of Abdominal aortic aneurysm (H) (12/25/2016), Acute on chronic systolic congestive heart failure (H) (6/7/2018), Anemia (6/7/2018), Anemia due to blood loss, acute (6/13/2017), Aortic stenosis, Benign essential hypertension (1/6/2017), Benign non-nodular prostatic hyperplasia with lower urinary tract symptoms (10/20/2016), CAD (coronary artery disease), Carotid artery stenosis (10/20/2016), Cerebrovascular accident (H) (10/20/2016), Cognitive impairment (6/7/2018), Coronary artery disease of native artery of native heart with stable angina pectoris (H) (10/20/2016), Depression, unspecified depression type (2/22/2018), Diabetes mellitus (H), Diabetic ulcer of left foot associated with diabetes mellitus due to underlying condition (H) (6/12/2017), 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) (6/12/2017), Gastro-oesophageal reflux disease, Gastroesophageal reflux disease without esophagitis (10/20/2016), Heart attack (H), Hyperlipidemia, Hyperlipidemia, unspecified hyperlipidemia type (10/20/2016), Ischemic cardiomyopathy, Lumbar back pain (12/30/2016), Mild cognitive impairment (10/20/2016), Nephrolithiasis, NSTEMI (non-ST elevated myocardial infarction) (H) (6/7/2018), Osteopenia, PAD (peripheral artery disease) (H), Paroxysmal atrial fibrillation (H), Peripheral artery disease (H), RBBB with left anterior fascicular block, Slow transit constipation (2/22/2018), Stroke of unknown etiology (H) (12/31/2017), Syncope, TIA (transient ischemic attack) (1/20/2017), Unspecified cerebral artery occlusion with cerebral infarction, UTI (urinary tract infection) due to Enterococcus (2/22/2018), and Ventricular tachycardia (H). He also has no past medical history of Difficult intubation or Malignant hyperthermia.  PAST SURGICAL HISTORY:   has a past surgical history that includes Cholecystectomy; hernia repair; Abdomen surgery; Laser holmium lithotripsy  ureter(s), insert stent, combined (3/2/2012); rotator cuff repair rt/lt; Esophagoscopy, gastroscopy, duodenoscopy (EGD), combined (N/A, 12/26/2016); UGI ENDOSCOPY W EUS (N/A, 12/29/2016); Amputate foot (Left, 6/4/2017); Incision and drainage foot, combined (Left, 6/6/2017); and Endarterectomy carotid (Right, 1/3/2018).  FAMILY HISTORY: family history includes Breast Cancer in his mother; Heart Failure (age of onset: 81) in his father.  SOCIAL HISTORY:   reports that he quit smoking about 21 years ago. His smoking use included cigarettes. He started smoking about 51 years ago. He has a 30.00 pack-year smoking history. He has never used smokeless tobacco. He reports previous alcohol use of about 7.0 standard drinks of alcohol per week. He reports that he does not use drugs.    Post Discharge Medication Reconciliation Status: discharge medications reconciled and changed, per note/orders (see AVS)    Current Outpatient Medications   Medication Sig Dispense Refill     acetaminophen (TYLENOL) 500 MG tablet Take 1,000 mg by mouth every 6 hours as needed for mild pain        aspirin EC 81 MG EC tablet Take 1 tablet (81 mg) by mouth daily 30 tablet 0     atorvastatin (LIPITOR) 40 MG tablet Take 40 mg by mouth daily       bisacodyl (DULCOLAX) 10 MG suppository Place 1 suppository (10 mg) rectally daily as needed for constipation 4 suppository 11     DULoxetine (CYMBALTA) 30 MG capsule TAKE 1 CAPSULE(30 MG) BY MOUTH DAILY 90 capsule 2     insulin glargine (LANTUS PEN) 100 UNIT/ML pen Inject 26 Units Subcutaneous At Bedtime       ipratropium (ATROVENT) 0.03 % nasal spray INHALE 1 SPRAY IN EACH NOSTRIL EVERY 12 HOURS AS NEEDED 30 mL 11     Lacosamide (VIMPAT) 100 MG TABS tablet Take 1 tablet (100 mg) by mouth 2 times daily       levETIRAcetam (KEPPRA) 750 MG tablet Take 1 tablet (750 mg) by mouth 2 times daily       MAGNESIUM-OXIDE 400 (241.3 Mg) MG tablet TAKE 1 TABLET BY MOUTH TWICE DAILY 180 tablet 1     metFORMIN  "(GLUCOPHAGE) 1000 MG tablet TAKE 1 TABLET BY MOUTH TWICE DAILY WITH MEALS 180 tablet 0     metoprolol succinate ER (TOPROL-XL) 25 MG 24 hr tablet Take 12.5 mg by mouth At Bedtime       nitroGLYcerin (NITROSTAT) 0.4 MG sublingual tablet For chest pain place 1 tablet under the tongue every 5 minutes for 3 doses. If symptoms persist 5 minutes after 1st dose call 911. 25 tablet 0     omeprazole (PRILOSEC) 40 MG DR capsule TAKE 1 CAPSULE(40 MG) BY MOUTH DAILY 30 TO 60 MINUTES BEFORE A MEAL 90 capsule 0     ondansetron (ZOFRAN) 4 MG tablet Take by mouth every 8 hours as needed for nausea       polyethylene glycol (MIRALAX) packet Take 1 packet by mouth 2 times daily as needed for constipation       polyethylene glycol-propylene glycol (SYSTANE ULTRA) 0.4-0.3 % SOLN ophthalmic solution Place 2 drops into both eyes daily as needed for dry eyes       sennosides (SENOKOT) 8.6 MG tablet Take 2 tablets by mouth daily (Patient taking differently: Take 2 tablets by mouth daily as needed )       tamsulosin (FLOMAX) 0.4 MG capsule TAKE 1 CAPSULE BY MOUTH DAILY 90 capsule 3     ticagrelor (BRILINTA) 90 MG tablet Take 1 tablet (90 mg) by mouth 2 times daily 180 tablet 1     blood glucose monitoring (NO BRAND SPECIFIED) meter device kit Use to test blood sugar bid times daily or as directed. 1 kit 0     order for DME Equipment being ordered: wheeled walker with hand breaks and seat 1 each 0     order for DME Equipment being ordered: True Matrix Blood Glucose meter. 1 Act 11     TRUE METRIX BLOOD GLUCOSE TEST test strip USE TO TEST THREE TIMES DAILY OR AS DIRECTED 300 strip 0       ROS:  4 point ROS including Respiratory, CV, GI and , other than that noted in the HPI,  is negative    Vitals:  /49   Pulse (!) 49   Temp 97.3  F (36.3  C)   Resp 16   Ht 1.676 m (5' 6\")   Wt 68 kg (150 lb)   SpO2 97%   BMI 24.21 kg/m    Exam:  GENERAL APPEARANCE:  Alert, in no distress, cooperative  ENT:  Mouth normal, moist mucous " membranes, normal hearing acuity  EYES:  Conjunctiva and lids normal  RESP:  no respiratory distress  GI: positive bowel sounds x 4 quadrants, no pain with palpation  NEURO:   No facial asymmetry, speech clear  PSYCH:  oriented X 3, affect and mood normal     Lab/Diagnostic data:  Most Recent 3 CBC's:  Recent Labs   Lab Test 04/20/20  0901 04/18/20 1944 03/06/20  0051   WBC 8.8 8.1 9.9   HGB 12.2* 11.9* 12.1*   MCV 90 90 88    228 210     Most Recent 3 BMP's:  Recent Labs   Lab Test 04/18/20 1944 03/06/20  0014 03/05/20  1411    137 139   POTASSIUM 4.3 4.5 4.4   CHLORIDE 102 106 107   CO2 28 26 25   BUN 17 22 20   CR 0.89 0.87 1.08   ANIONGAP 8 5 7   ALLISON 9.1 9.1 9.6   * 181* 228*     Most Recent TSH and T4:  Recent Labs   Lab Test 06/03/18  0520   TSH 4.45*   T4 0.98     Most Recent Hemoglobin A1c:  Recent Labs   Lab Test 03/05/20  1159   A1C 8.3*       ASSESSMENT/PLAN:  Generalized muscle weakness  Word finding difficulty  Recurrent seizures (H)  Acute on chronic issues, felt to be r/t seizure. Meds increased as noted above - f/u with neuro as recommended. Therapies - f/u with progress next visit.     ICAO (internal carotid artery occlusion), left  Coronary artery disease involving native coronary artery of native heart without angina pectoris  Chronic issues, no symptoms today. Meds, vs, wt, as ordered. Cards f/u per routine.     Type 2 diabetes mellitus with other circulatory complication, with long-term current use of insulin (H)  Chronic, fair control. Meds as above, BG check twice daily. Monitor.     Benign prostatic hyperplasia without lower urinary tract symptoms  Chronic, no symptoms. Meds as PTA.     Gastroesophageal reflux disease with esophagitis  Chronic, today some nausea. Add prn Zofran - Staff to update provider if not effective.     Anxiety and depression  Chronic stable, monitor.     Advanced directives, counseling/discussion  Reviewed POLST - asks for DNR status     Orders  written by provider at facility  1. DNR  2. Zofran 4 mg PO TID PRN nausea  3. Change tylenol to 1000 mg PO QID PRN pain  4. Check BG BID at alternating times  5. F/U neurology Dr Ponce as recommended    Electronically signed by:  ASPEN Orozco CNP                         Sincerely,        ASPEN Orozco CNP

## 2020-04-22 NOTE — PROGRESS NOTES
[facility]  PRIMARY CARE PROVIDER AND CLINIC:  Matt Morrissey MD, [address] / [place] [address]  Chief Complaint   Patient presents with     Hospital F/U     [facility] Record Number:  [id number]  Place of Service where encounter took place:  [facility] [[id number]]    Dustin Pearce  is a 89 year old  (1/31/1928), admitted to the above facility from  [facility]. Hospital stay 4/18/2020 through 4/21/2020..  Admitted to this facility for  rehab, medical management and nursing care.    HPI:    HPI information obtained from: facility chart records, facility staff, patient report and [facility] chart review.   Brief Summary of Hospital Course:   89 yo male with hx recurrent seizures, CAD with ischemic CM, DM2, BPH, GERD and anxiety/depression hospitalized with bilat leg weakness and aphasia. CT: no new infarcts, old left frontal infarct and altered perfusion in the posterior left hemisphere, chronically occluded left ICA, and moderate stenosis in the left P2 segment. MRI negative. Neuro: episode most consistent with seizure, increased vimpat to 100 mg BID, continue BP meds, ASA and ticagrelor and statin. CAD: sees Dr Lizzie KEENE heart, echo 3/2020 with 30-35% EF, moderate global hypokinesis with discrete regional WMAs and unchanged. See Dr Samuel one week. DM on insulin sliding scale. Other problems with meds as PTA.     Updates on Status Since Skilled nursing Admission:   Seen today for initial TCU visit. Reports some nausea today and had small emesis - this happens occasionally. Does not recall las BM but has good bowel sounds. Since admission note -131 and HR 49-71. Sats 97 % on room air. BG low 200s when checked    CODE STATUS/ADVANCE DIRECTIVES DISCUSSION:   DNR  Patient's living condition: lives with spouse  ALLERGIES: Oxycodone; Sulfa drugs; and Tetracycline  PAST MEDICAL HISTORY:  has a past medical history of Abdominal aortic  aneurysm (H) (12/25/2016), Acute on chronic systolic congestive heart failure (H) (6/7/2018), Anemia (6/7/2018), Anemia due to blood loss, acute (6/13/2017), Aortic stenosis, Benign essential hypertension (1/6/2017), Benign non-nodular prostatic hyperplasia with lower urinary tract symptoms (10/20/2016), CAD (coronary artery disease), Carotid artery stenosis (10/20/2016), Cerebrovascular accident (H) (10/20/2016), Cognitive impairment (6/7/2018), Coronary artery disease of native artery of native heart with stable angina pectoris (H) (10/20/2016), Depression, unspecified depression type (2/22/2018), Diabetes mellitus (H), Diabetic ulcer of left foot associated with diabetes mellitus due to underlying condition (H) (6/12/2017), 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) (6/12/2017), Gastro-oesophageal reflux disease, Gastroesophageal reflux disease without esophagitis (10/20/2016), Heart attack (H), Hyperlipidemia, Hyperlipidemia, unspecified hyperlipidemia type (10/20/2016), Ischemic cardiomyopathy, Lumbar back pain (12/30/2016), Mild cognitive impairment (10/20/2016), Nephrolithiasis, NSTEMI (non-ST elevated myocardial infarction) (H) (6/7/2018), Osteopenia, PAD (peripheral artery disease) (H), Paroxysmal atrial fibrillation (H), Peripheral artery disease (H), RBBB with left anterior fascicular block, Slow transit constipation (2/22/2018), Stroke of unknown etiology (H) (12/31/2017), Syncope, TIA (transient ischemic attack) (1/20/2017), Unspecified cerebral artery occlusion with cerebral infarction, UTI (urinary tract infection) due to Enterococcus (2/22/2018), and Ventricular tachycardia (H). He also has no past medical history of Difficult intubation or Malignant hyperthermia.  PAST SURGICAL HISTORY:   has a past surgical history that includes Cholecystectomy; hernia repair; Abdomen surgery; Laser holmium lithotripsy ureter(s), insert stent, combined (3/2/2012); rotator cuff repair rt/lt; Esophagoscopy,  gastroscopy, duodenoscopy (EGD), combined (N/A, 12/26/2016); UGI ENDOSCOPY W EUS (N/A, 12/29/2016); Amputate foot (Left, 6/4/2017); Incision and drainage foot, combined (Left, 6/6/2017); and Endarterectomy carotid (Right, 1/3/2018).  FAMILY HISTORY: family history includes Breast Cancer in his mother; Heart Failure (age of onset: 81) in his father.  SOCIAL HISTORY:   reports that he quit smoking about 21 years ago. His smoking use included cigarettes. He started smoking about 51 years ago. He has a 30.00 pack-year smoking history. He has never used smokeless tobacco. He reports previous alcohol use of about 7.0 standard drinks of alcohol per week. He reports that he does not use drugs.    Post Discharge Medication Reconciliation Status: discharge medications reconciled and changed, per note/orders (see AVS)    Current Outpatient Medications   Medication Sig Dispense Refill     acetaminophen (TYLENOL) 500 MG tablet Take 1,000 mg by mouth every 6 hours as needed for mild pain        aspirin EC 81 MG EC tablet Take 1 tablet (81 mg) by mouth daily 30 tablet 0     atorvastatin (LIPITOR) 40 MG tablet Take 40 mg by mouth daily       bisacodyl (DULCOLAX) 10 MG suppository Place 1 suppository (10 mg) rectally daily as needed for constipation 4 suppository 11     DULoxetine (CYMBALTA) 30 MG capsule TAKE 1 CAPSULE(30 MG) BY MOUTH DAILY 90 capsule 2     insulin glargine (LANTUS PEN) 100 UNIT/ML pen Inject 26 Units Subcutaneous At Bedtime       ipratropium (ATROVENT) 0.03 % nasal spray INHALE 1 SPRAY IN EACH NOSTRIL EVERY 12 HOURS AS NEEDED 30 mL 11     Lacosamide (VIMPAT) 100 MG TABS tablet Take 1 tablet (100 mg) by mouth 2 times daily       levETIRAcetam (KEPPRA) 750 MG tablet Take 1 tablet (750 mg) by mouth 2 times daily       MAGNESIUM-OXIDE 400 (241.3 Mg) MG tablet TAKE 1 TABLET BY MOUTH TWICE DAILY 180 tablet 1     metFORMIN (GLUCOPHAGE) 1000 MG tablet TAKE 1 TABLET BY MOUTH TWICE DAILY WITH MEALS 180 tablet 0      "metoprolol succinate ER (TOPROL-XL) 25 MG 24 hr tablet Take 12.5 mg by mouth At Bedtime       nitroGLYcerin (NITROSTAT) 0.4 MG sublingual tablet For chest pain place 1 tablet under the tongue every 5 minutes for 3 doses. If symptoms persist 5 minutes after 1st dose call 911. 25 tablet 0     omeprazole (PRILOSEC) 40 MG DR capsule TAKE 1 CAPSULE(40 MG) BY MOUTH DAILY 30 TO 60 MINUTES BEFORE A MEAL 90 capsule 0     ondansetron (ZOFRAN) 4 MG tablet Take by mouth every 8 hours as needed for nausea       polyethylene glycol (MIRALAX) packet Take 1 packet by mouth 2 times daily as needed for constipation       polyethylene glycol-propylene glycol (SYSTANE ULTRA) 0.4-0.3 % SOLN ophthalmic solution Place 2 drops into both eyes daily as needed for dry eyes       sennosides (SENOKOT) 8.6 MG tablet Take 2 tablets by mouth daily (Patient taking differently: Take 2 tablets by mouth daily as needed )       tamsulosin (FLOMAX) 0.4 MG capsule TAKE 1 CAPSULE BY MOUTH DAILY 90 capsule 3     ticagrelor (BRILINTA) 90 MG tablet Take 1 tablet (90 mg) by mouth 2 times daily 180 tablet 1     blood glucose monitoring (NO BRAND SPECIFIED) meter device kit Use to test blood sugar bid times daily or as directed. 1 kit 0     order for DME Equipment being ordered: wheeled walker with hand breaks and seat 1 each 0     order for DME Equipment being ordered: True Matrix Blood Glucose meter. 1 Act 11     TRUE METRIX BLOOD GLUCOSE TEST test strip USE TO TEST THREE TIMES DAILY OR AS DIRECTED 300 strip 0       ROS:  4 point ROS including Respiratory, CV, GI and , other than that noted in the HPI,  is negative    Vitals:  /49   Pulse (!) 49   Temp 97.3  F (36.3  C)   Resp 16   Ht 1.676 m (5' 6\")   Wt 68 kg (150 lb)   SpO2 97%   BMI 24.21 kg/m    Exam:  GENERAL APPEARANCE:  Alert, in no distress, cooperative  ENT:  Mouth normal, moist mucous membranes, normal hearing acuity  EYES:  Conjunctiva and lids normal  RESP:  no respiratory " distress  GI: positive bowel sounds x 4 quadrants, no pain with palpation  NEURO:   No facial asymmetry, speech clear  PSYCH:  oriented X 3, affect and mood normal     Lab/Diagnostic data:  Most Recent 3 CBC's:  Recent Labs   Lab Test 04/20/20  0901 04/18/20  1944 03/06/20  0051   WBC 8.8 8.1 9.9   HGB 12.2* 11.9* 12.1*   MCV 90 90 88    228 210     Most Recent 3 BMP's:  Recent Labs   Lab Test 04/18/20  1944 03/06/20  0014 03/05/20  1411    137 139   POTASSIUM 4.3 4.5 4.4   CHLORIDE 102 106 107   CO2 28 26 25   BUN 17 22 20   CR 0.89 0.87 1.08   ANIONGAP 8 5 7   ALLISON 9.1 9.1 9.6   * 181* 228*     Most Recent TSH and T4:  Recent Labs   Lab Test 06/03/18  0520   TSH 4.45*   T4 0.98     Most Recent Hemoglobin A1c:  Recent Labs   Lab Test 03/05/20  1159   A1C 8.3*       ASSESSMENT/PLAN:  Generalized muscle weakness  Word finding difficulty  Recurrent seizures (H)  Acute on chronic issues, felt to be r/t seizure. Meds increased as noted above - f/u with neuro as recommended. Therapies - f/u with progress next visit.     ICAO (internal carotid artery occlusion), left  Coronary artery disease involving native coronary artery of native heart without angina pectoris  Chronic issues, no symptoms today. Meds, vs, wt, as ordered. Cards f/u per routine.     Type 2 diabetes mellitus with other circulatory complication, with long-term current use of insulin (H)  Chronic, fair control. Meds as above, BG check twice daily. Monitor.     Benign prostatic hyperplasia without lower urinary tract symptoms  Chronic, no symptoms. Meds as PTA.     Gastroesophageal reflux disease with esophagitis  Chronic, today some nausea. Add prn Zofran - Staff to update provider if not effective.     Anxiety and depression  Chronic stable, monitor.     Advanced directives, counseling/discussion  Reviewed POLST - asks for DNR status     Orders written by provider at facility  1. DNR  2. Zofran 4 mg PO TID PRN nausea  3. Change tylenol  to 1000 mg PO QID PRN pain  4. Check BG BID at alternating times  5. F/U neurology Dr Ponce as recommended    Electronically signed by:  ASPEN Orozco CNP

## 2020-04-22 NOTE — PROGRESS NOTES
[facility]     Clinic Care Coordination Contact  Care Coordination Transition Communication     Clinical Data:   Discharge Summary  Hospitalist     Date of Admission:  4/18/2020  Date of Discharge:  4/21/2020  Discharging Provider: Smith Collado MD        Discharge Diagnoses         Weakness and word finding difficulties-likely due to recurrent seizure.  Recurrent seizure.  Known chronic left ICA occlusion  CAD with ischemic CM  DM2  BPH  GERD  Anxiety and depression      Transition to Facility:              [facility]: [facility]              Contact name and phone number/fax: CC faxed contact information to the facility to call back when the patient is discharged from TCU    Plan: RN/SW Care Coordinator will await notification from facility staff informing RN/SW Care Coordinator of patient's discharge plans/needs. RN/SW Care Coordinator will review chart and outreach to facility staff every 4 weeks and as needed.     [facility]     Farnaz Eddy RN Care Coordinator   [facility]   Phone: [phone]  Email :  [email or web address]

## 2020-04-22 NOTE — PLAN OF CARE
Occupational Therapy Discharge Summary     Reason for therapy discharge:    Discharged to transitional care facility.     Progress towards therapy goal(s). See goals on Care Plan in [facility] electronic health record for goal details.  Goals not met.  Barriers to achieving goals:   discharge from facility.     Therapy recommendation(s):    Continued therapy is recommended.  Rationale/Recommendations:  eval and treat at TCU.    Pt not seen by discharging therapist on this date, note written based on previous treating therapist's notes and recommendations.

## 2020-04-30 NOTE — PROGRESS NOTES
"Dustin Pearce is a 89 year old male who is being evaluated via a billable telephone visit.      The patient has been notified of following:     \"This telephone visit will be conducted via a call between you and your physician/provider. We have found that certain health care needs can be provided without the need for a physical exam.  This service lets us provide the care you need with a short phone conversation.  If a prescription is necessary we can send it directly to your pharmacy.  If lab work is needed we can place an order for that and you can then stop by our lab to have the test done at a later time.    Telephone visits are billed at different rates depending on your insurance coverage. During this emergency period, for some insurers they may be billed the same as an in-person visit.  Please reach out to your insurance provider with any questions.    If during the course of the call the physician/provider feels a telephone visit is not appropriate, you will not be charged for this service.\"    Patient has given verbal consent for Telephone visit?  Yes    How would you like to obtain your AVS? Mail a copy     Patients spouse Halina [phone]  Nurse at [facility] [phone]  Pt resides at [facility]   [facility]      Review Of Systems/unaable pt in [facility]/[facility]  Skin: NEGATIVE  Eyes:Ears/Nose/Throat: NEGATIVE  Respiratory: NEGATIVE  Cardiovascular:NEGATIVE  Gastrointestinal: NEGATIVE  Genitourinary:NEGATIVE   Musculoskeletal: NEGATIVE  Neurologic: NEGATIVE  Psychiatric: NEGATIVE  Hematologic/Lymphatic/Immunologic: NEGATIVE  Endocrine:  NEGATIVE                  Dustin Pearce is a pleasant 89-year-old male with a history of severe coronary artery disease, and ischemic cardiomyopathy, CVA and history of TIA, peripheral artery disease with prior right carotid endarterectomy, known left carotid occlusion, history of common left iliac stenting and left SFA angioplasty and left axillary and subclavian " "stenosis.    He is a patient of Dr. jones and was last seen in our clinic in August 2019.    He was admitted in May 2018 with a non-ST elevation myocardial infarction.  He was found to have severe three-vessel coronary artery disease on coronary angiogram including a  of the RCA, proximal severe LAD disease and severe proximal left circumflex disease.  He underwent stenting to the LAD and circumflex.  An echocardiogram showed an LVEF of 35 to 40% with severe inferolateral wall hypokinesis and moderate inferior wall hypokinesis as well.  He was readmitted shortly after that with recurrent chest discomfort and was diuresed with oral Lasix.    Last year, he was having issues with occasional episodes of syncope.  He had a cardiac work-up including a Ziopatch which showed some nonsustained VT but no bradycardia or other arrhythmias.  Dr. jones evaluated him in May 2019 and recommended an EP study as well as a loop recorder implantation although he and his wife elected not to pursue these.    He was admitted several times in the summer 2019 with \"spells\" concerning for TIA or possible seizure.  He was seen by neurology and seizure medications were increased.  At his last visit with Dr. jones, given his extensive stenting and they elected to continue dual antiplatelet therapy going forward even though he was over a year out from his stents.    Over the last 2 months, he has had 2 admissions.  He was admitted in March with recurrent slurred speech, left facial droop and suspected seizures.  He was seen by neurology and his antiseizure medications were adjusted.  He had an echocardiogram done on 3/6 that showed an EF of 30 to 35% with regional wall motion abnormalities.  There was no significant change from prior.    He was subsequently readmitted earlier this month with similar symptoms which were felt again to likely be related to recurrent seizures.  Further medication adjustments were made.  He again had another " echocardiogram which was stable.  Troponins were negative during this admission.  Outpatient cardiology follow-up was recommended as it has been sometime.

## 2020-05-01 PROBLEM — K57.92 DIVERTICULITIS: Status: ACTIVE | Noted: 2020-01-01

## 2020-05-01 NOTE — PROGRESS NOTES
"Dustin Pearce is a 89 year old male who is being evaluated via a billable telephone visit.      The patient has been notified of following:     \"This telephone visit will be conducted via a call between you and your physician/provider. We have found that certain health care needs can be provided without the need for a physical exam.  This service lets us provide the care you need with a short phone conversation.  If a prescription is necessary we can send it directly to your pharmacy.  If lab work is needed we can place an order for that and you can then stop by our lab to have the test done at a later time.    Telephone visits are billed at different rates depending on your insurance coverage. During this emergency period, for some insurers they may be billed the same as an in-person visit.  Please reach out to your insurance provider with any questions.    If during the course of the call the physician/provider feels a telephone visit is not appropriate, you will not be charged for this service.\"    Patient has given verbal consent for Telephone visit?  Yes    How would you like to obtain your AVS? Mail a copy     Patients spouse Halina [phone]  Nurse at [place] [phone]  Pt resides at [place]   [facility] [place]      Review Of Systems/unaable pt in [place]/[facility]  Skin: NEGATIVE  Eyes:Ears/Nose/Throat: NEGATIVE  Respiratory: NEGATIVE  Cardiovascular:NEGATIVE  Gastrointestinal: NEGATIVE  Genitourinary:NEGATIVE   Musculoskeletal: NEGATIVE  Neurologic: NEGATIVE  Psychiatric: NEGATIVE  Hematologic/Lymphatic/Immunologic: NEGATIVE  Endocrine:  NEGATIVE      Provider notes:  Dustin Pearce is a pleasant 89-year-old male with a history of severe coronary artery disease, and ischemic cardiomyopathy, CVA and history of TIA, peripheral artery disease with prior right carotid endarterectomy, known left carotid occlusion, history of common left iliac stenting and left SFA angioplasty and left axillary and subclavian " "stenosis.    He is a patient of Dr. jones and was last seen in our clinic in August 2019.    He was admitted in May 2018 with a non-ST elevation myocardial infarction.  He was found to have severe three-vessel coronary artery disease on coronary angiogram including a  of the RCA, proximal severe LAD disease and severe proximal left circumflex disease.  He underwent stenting to the LAD and circumflex.  An echocardiogram showed an LVEF of 35 to 40% with severe inferolateral wall hypokinesis and moderate inferior wall hypokinesis as well.  He was readmitted shortly after that with recurrent chest discomfort and was diuresed with oral Lasix.    Last year, he was having issues with occasional episodes of syncope.  He had a cardiac work-up including a Ziopatch which showed some nonsustained VT but no bradycardia or other arrhythmias.  Dr. jones evaluated him in May 2019 and recommended an EP study as well as a loop recorder implantation although he and his wife elected not to pursue these.    He was admitted several times in the summer 2019 with \"spells\" concerning for TIA or possible seizure.  He was seen by neurology and seizure medications were increased.  At his last visit with Dr. jones, given his extensive stenting and they elected to continue dual antiplatelet therapy going forward even though he was over a year out from his stents.    Over the last 2 months, he has had 2 admissions.  He was admitted in March with recurrent slurred speech, left facial droop and suspected seizures.  He was seen by neurology and his antiseizure medications were adjusted.  He had an echocardiogram done on 3/6 that showed an EF of 30 to 35% with regional wall motion abnormalities.  There was no significant change from prior.    He was subsequently readmitted earlier this month with similar symptoms which were felt again to likely be related to recurrent seizures.  Further medication adjustments were made.  He again had another " echocardiogram which was stable.  Troponins were negative during this admission.  Outpatient cardiology follow-up was recommended as it has been sometime.    I spoke with with Dustin's wife as he is unable to provider much history. He currently is at [place] for rehab. His wife notes it has been a difficult last few months with him in and out of the rehab and hospital. He has not been having issues with chest pain, shortness of breath, orthopnea or PND. He has been falling recently and fell yesterday at [place].    Assessment/plan:  Dustin Pearce is a pleasant 89-year-old male with a history of an ischemic cardiomyopathy with an EF now down to 30 to 35%, severe coronary artery disease with prior stenting of the circumflex and LAD, severe PAD among other medical comorbidities as noted above.  Unfortunately, he has been hospitalized several times now with recurrent issues of weakness potentially related to recurrent seizures.      It seems he has not had any cardiovascular issues over the last several months.  He has not had issues with congestive heart failure and does not have any symptoms suggestive of recurrent angina.  His recent echocardiograms that show his LV function appears to have deteriorated somewhat.  Last year, it was running around 40 to 45% and is now around 30 to 35%.  I discussed with his wife, given his current clinical status and comorbidities it certainly does not feel like invasive or aggressive work-up for this including an ischemic evaluation is appropriate at that time.  She agrees and would like to manage conservatively.  He is on only low-dose metoprolol XL at 12.5 mg daily.  He previously was on lisinopril but this was discontinued due to dizziness issues.  I reviewed his blood pressures.  It does appear like at times they are mildly elevated.  If they continue to be elevated, lisinopril would be a reasonable choice for hypertension and his cardiomyopathy.  It sounds like he is having  recurrent imbalance, dizziness, and fall issues so we need to be cautious that this does not make that worse.    He remains on dual antiplatelet therapy with Brilinta and aspirin.  Dr. Samuel had previously discussed and they elected to continue this unless he has any issues.  He also remains on high intensity statin therapy with Lipitor 40 mg daily.  His last LDL on 3/6/2020 was 69.    I made no changes to his medicines today.  I recommended follow-up visit with Dr. samuel in about 3 months.    Total call time: 13 minutes    JOVI Greene PA-C 8:51 AM 5/1/2020

## 2020-05-01 NOTE — ED NOTES
Bed: ED13  Expected date:   Expected time:   Means of arrival:   Comments:  mis - 89 M fall altered mental status ?covid eta [id number]

## 2020-05-01 NOTE — ED TRIAGE NOTES
Pt fell about 1 hour ago. Pt lives at [place] with positive COVIDs in house but pt is not symptomatic. Hx of seizures but pt thinks he was just weak and fell.

## 2020-05-01 NOTE — PATIENT INSTRUCTIONS
Thank you for your [facility] visit today. Your provider has recommended the following:    Medication Changes:  No medication changes today.   Follow-up:  See Dr. Rojo for cardiology follow up in three months    Reminder:  Please bring in all current medications, over the counter supplements and vitamin bottles to your next appointment.            [facility]

## 2020-05-01 NOTE — ED NOTES
"[facility]  ED Nurse Handoff Report    ED Chief complaint: Fall and Cough      ED Diagnosis:   Final diagnoses:   Severe sepsis (H)   Sigmoid diverticulitis   Altered mental status, unspecified altered mental status type   Sinus bradycardia       Code Status: Currently changed to DNR on 4/22, discuss further with admitting MD    Allergies:   Allergies   Allergen Reactions     Oxycodone Other (See Comments)     \"TERRIBLE SWEATING\"     Sulfa Drugs      Tetracycline        Patient Story: Lives at [facility], fell this morning. Did not lose LOC, did not hit head. Hx of seizure but states this does not feel like a seizure. Pt  More lethargic than baseline.  Focused Assessment:  LS dim but clear.; Bradycardic entire time. Arousable but falls right back asleep.     Treatments and/or interventions provided: Zosyn and IVF bolus started  Patient's response to treatments and/or interventions: No change    To be done/followed up on inpatient unit:  See inpatient orders.     Does this patient have any cognitive concerns?: Disoriented to time    Activity level - Baseline/Home:  Stand with assist x2  Activity Level - Current:   Stand with assist x2    Patient's Preferred language: English   Needed?: No    Isolation: None  Infection: COVID 19 pending    Bariatric?: No    Vital Signs:   Vitals:    05/01/20 1400 05/01/20 1415 05/01/20 1429 05/01/20 1434   BP:   115/69 132/54   Pulse:       Resp: 20 19 11 10   Temp:       TempSrc:       SpO2: 97% 99% 99% 99%       Cardiac Rhythm:     Was the PSS-3 completed:   Yes  What interventions are required if any?               Family Comments: None present  OBS brochure/video discussed/provided to patient/family: N/A               For the majority of the shift this patient's behavior was Green.   Behavioral interventions performed were None needed.    ED NURSE PHONE NUMBER: [phone]       "

## 2020-05-01 NOTE — H&P
[facility]    History and Physical  Hospitalist       Date of Admission:  5/1/2020    Assessment & Plan   Dustin Pearce is a 89 year old male with PMH as outlined as well as pertinent medical hx of recent admission to [facility] (4/18-4/21/20) for eval of weakness and aphasia, DM type 2, CAD, L. Carotid artery stenosis, seizure disorder and paroxysmal A-fib who presents from nursing home following a fall.    Diverticulitis  Noted on CT abd, sig for minimal inflammation. No leucocytosis or abd tenderness.  - Will continue treatment with Zosyn  - IVF at 75cc/hr given HFrEF  - Given minimal inflammation, advance to [facility] as tolerated     Bradycardia  ECG remarkable for sinus bradycardia, prior ECGs have captured this but not persistent. Pt is on metoprolol succinate 12.5mg daily. ED provider discussed case with Cardiology re utility of atropine and the decision was made to defer for now, given stable BP  - Hold metoprolol  - Tele monitor  - Will consult Cardiology for eval.     Fall  Weakness  Pt was found on the ground by staff at nursing home, per Pt there was no LOC, CT head show no hemorrhage or acute pathology; CT chest, abd/pelvis showed no acute fracture. It is possible that ongoing weakness which was one of the presenting symptoms during most recent hospitalization is due to bradycardia noted again during this presentation.  - SARS-CoV-19 test pending 2nd test as first test returned positive  - Treatment for bradycardia and infx as above  - PT/OT pending COVID test above    Lactic acidosis  Possibly secondary to tissue hypoperfusion as a result of vol depletion in setting of diverticulitis.  - Tx of diverticulitis as above  - MIVF as above    Hyponatremia  Na 132 on presentation; likely hypovolemic hyponatremia.   - Vol repletion   - Recheck Na in AM    Anemia  Hgb 10.8, normocytic and chronic, with baseline approx 12. No evidence of active bleed.  - Monitor       Chronic  # Recurrent seizure.  #  Known chronic left ICA occlusion  # CAD with ischemic CM  # DM2  # BPH  # GERD  # Anxiety and depression  - Continue PTA meds, except as above; hold metformin and include sliding scale insulin to PTA insulin regimen.      FEN/GI: D5 NS @ 75/Replete PRN/Enteral   VTE PPX:  Brilinta/ ASA  Home Medication List Reconciliation: Completed  Access: PIV  Code Status: Full  Disposition: Anticipate greater than 2 midnights.    SOO Solis MD  Hospitalist Service  [phone] (P)  Text Page 12P to 10P    Primary Care Physician   Matt Morrissey    Chief Complaint   Fall    Unable to obtain a history from the patient due to baseline dementia.    History of Present Illness   Dustin Pearce is a 89 year old male with PMH as outlined as well as pertinent medical hx of recent admission to [facility] (4/18-4/21/20) for eval of weakness and aphasia, DM type 2, CAD, L. Carotid artery stenosis, seizure disorder and paroxysmal A-fib who presents from nursing home following a fall. Reportedly fall occurred 1 hour after he was last seen normal by staff at nursing home. Pt reports weakness, episode of emesis x2 since night PTA, cough (uncertain chronicity). There have been positive COVID-19 cases at his nursing home. He has no diarrhea, abd pain, headache, sore throat or dyspnea.     In the ED, VS remarkable for brief SBP 70s, which improved on recheck and fluids, HR 40s. Further clinical eval was remarkable for labs w/Na 132, lactate 3.7; Hgb 10.8. Imaging w/CT consistent with diverticulitis.      Past Medical History    I have reviewed this patient's medical history I have reviewed this patient's family history  Noted in EMR unable to verify due to baseline demntia.  Past Medical History:   Diagnosis Date     Abdominal aortic aneurysm (H) 12/25/2016     Acute on chronic systolic congestive heart failure (H) 6/7/2018     Anemia 6/7/2018     Anemia due to blood loss, acute 6/13/2017     Aortic stenosis     mild     Benign essential  hypertension 1/6/2017     Benign non-nodular prostatic hyperplasia with lower urinary tract symptoms 10/20/2016     CAD (coronary artery disease)     MI 1970     Carotid artery stenosis 10/20/2016    chronically occluded left internal carotid artery     Cerebrovascular accident (H) 10/20/2016     Cognitive impairment 6/7/2018     Coronary artery disease of native artery of native heart with stable angina pectoris (H) 10/20/2016    MI 1970     Depression, unspecified depression type 2/22/2018     Diabetes mellitus (H)      Diabetic ulcer of left foot associated with diabetes mellitus due to underlying condition (H) 6/12/2017     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     Gastro-oesophageal reflux disease      Gastroesophageal reflux disease without esophagitis 10/20/2016     Heart attack (H)      Hyperlipidemia      Hyperlipidemia, unspecified hyperlipidemia type 10/20/2016     Ischemic cardiomyopathy      Lumbar back pain 12/30/2016     Mild cognitive impairment 10/20/2016     Nephrolithiasis     RECURRENT     NSTEMI (non-ST elevated myocardial infarction) (H) 6/7/2018     Osteopenia      PAD (peripheral artery disease) (H)     peripheral angiogram 5/2017: The common iliac artery stenoses were stented and the superficial femoral artery stenoses were angioplastied     Paroxysmal atrial fibrillation (H)      Peripheral artery disease (H)     peripheral angiogram 5/2017: The common iliac artery stenoses were stented and the superficial femoral artery stenoses were angioplastied     RBBB with left anterior fascicular block      Slow transit constipation 2/22/2018     Stroke of unknown etiology (H) 12/31/2017     Syncope      TIA (transient ischemic attack) 1/20/2017     Unspecified cerebral artery occlusion with cerebral infarction      UTI (urinary tract infection) due to Enterococcus 2/22/2018     Ventricular tachycardia (H)     nonsustained       Past Surgical History   I have reviewed this  patient's surgical history and updated it with pertinent information if needed.  Past Surgical History:   Procedure Laterality Date     AMPUTATE FOOT Left 6/4/2017    Procedure: AMPUTATE FOOT;  Sun Add#3: partial left foot amputation - Sagital Saw - Mini C-Arm;  Surgeon: Moe Kirk DPM;  Location:  OR     CHOLECYSTECTOMY       ENDARTERECTOMY CAROTID Right 1/3/2018    Procedure: ENDARTERECTOMY CAROTID;  RIGHT CAROTID ENDARTERECTOMY WITH EEG;  Surgeon: Roland Rodriguez MD;  Location:  OR     ESOPHAGOSCOPY, GASTROSCOPY, DUODENOSCOPY (EGD), COMBINED N/A 12/26/2016    Procedure: COMBINED ESOPHAGOSCOPY, GASTROSCOPY, DUODENOSCOPY (EGD);  Surgeon: Nasim Louis MD;  Location:  GI     GENITOURINARY SURGERY      KIDNEY STONE REMOVAL X 4     HC UGI ENDOSCOPY W EUS N/A 12/29/2016    Procedure: COMBINED ENDOSCOPIC ULTRASOUND, ESOPHAGOSCOPY, GASTROSCOPY, DUODENOSCOPY (EGD);  Surgeon: Nasim Louis MD;  Location:  GI     HERNIA REPAIR       INCISION AND DRAINAGE FOOT, COMBINED Left 6/6/2017    Procedure: COMBINED INCISION AND DRAINAGE FOOT;  REVISIONAL LEFT FOOT INCISION AND DRAINAGE WITH POSSIBLE FLAP CLOSURE , ANTIBIOTIC SOLUTION ;  Surgeon: Nabil Ann DPM;  Location:  OR     LASER HOLMIUM LITHOTRIPSY URETER(S), INSERT STENT, COMBINED  3/2/2012    Procedure:COMBINED CYSTOSCOPY, URETEROSCOPY, LASER HOLMIUM LITHOTRIPSY URETER(S), INSERT STENT; CYSTOSCOPY, RIGHT RETROGRADES, RIGHT URETEROSCOPY, HOLMIUM LASER, RIGHT STENT PLACEMENT; Surgeon:ANJELICA LEÓN; Location: OR     ROTATOR CUFF REPAIR RT/LT         Prior to Admission Medications   Prior to Admission Medications   Prescriptions Last Dose Informant Patient Reported? Taking?   DULoxetine (CYMBALTA) 30 MG capsule  Spouse/Significant Other No No   Sig: TAKE 1 CAPSULE(30 MG) BY MOUTH DAILY   Lacosamide (VIMPAT) 100 MG TABS tablet   No No   Sig: Take 1 tablet (100 mg) by mouth 2 times daily   MAGNESIUM-OXIDE 400 (241.3  Mg) MG tablet  Spouse/Significant Other No No   Sig: TAKE 1 TABLET BY MOUTH TWICE DAILY   TRUE METRIX BLOOD GLUCOSE TEST test strip   No No   Sig: USE TO TEST THREE TIMES DAILY OR AS DIRECTED   acetaminophen (TYLENOL) 500 MG tablet  Spouse/Significant Other Yes No   Sig: Take 1,000 mg by mouth every 6 hours as needed for mild pain    aspirin EC 81 MG EC tablet  Spouse/Significant Other No No   Sig: Take 1 tablet (81 mg) by mouth daily   atorvastatin (LIPITOR) 40 MG tablet  Spouse/Significant Other Yes No   Sig: Take 40 mg by mouth daily   bisacodyl (DULCOLAX) 10 MG suppository  Spouse/Significant Other No No   Sig: Place 1 suppository (10 mg) rectally daily as needed for constipation   blood glucose monitoring (NO BRAND SPECIFIED) meter device kit   No No   Sig: Use to test blood sugar bid times daily or as directed.   insulin glargine (LANTUS PEN) 100 UNIT/ML pen  Spouse/Significant Other Yes No   Sig: Inject 26 Units Subcutaneous At Bedtime   ipratropium (ATROVENT) 0.03 % nasal spray  Spouse/Significant Other No No   Sig: INHALE 1 SPRAY IN EACH NOSTRIL EVERY 12 HOURS AS NEEDED   levETIRAcetam (KEPPRA) 750 MG tablet  Spouse/Significant Other No No   Sig: Take 1 tablet (750 mg) by mouth 2 times daily   metFORMIN (GLUCOPHAGE) 1000 MG tablet  Spouse/Significant Other No No   Sig: TAKE 1 TABLET BY MOUTH TWICE DAILY WITH MEALS   metoprolol succinate ER (TOPROL-XL) 25 MG 24 hr tablet  Spouse/Significant Other Yes No   Sig: Take 12.5 mg by mouth At Bedtime   nitroGLYcerin (NITROSTAT) 0.4 MG sublingual tablet  Spouse/Significant Other No No   Sig: For chest pain place 1 tablet under the tongue every 5 minutes for 3 doses. If symptoms persist 5 minutes after 1st dose call 911.   omeprazole (PRILOSEC) 40 MG DR capsule  Spouse/Significant Other No No   Sig: TAKE 1 CAPSULE(40 MG) BY MOUTH DAILY 30 TO 60 MINUTES BEFORE A MEAL   ondansetron (ZOFRAN) 4 MG tablet   Yes No   Sig: Take by mouth every 8 hours as needed for nausea  "  order for DME   No No   Sig: Equipment being ordered: True Matrix Blood Glucose meter.   order for DME   No No   Sig: Equipment being ordered: wheeled walker with hand breaks and seat   polyethylene glycol (MIRALAX) packet  Spouse/Significant Other Yes No   Sig: Take 1 packet by mouth 2 times daily as needed for constipation   polyethylene glycol-propylene glycol (SYSTANE ULTRA) 0.4-0.3 % SOLN ophthalmic solution  Spouse/Significant Other Yes No   Sig: Place 2 drops into both eyes daily as needed for dry eyes   sennosides (SENOKOT) 8.6 MG tablet  Spouse/Significant Other No No   Sig: Take 2 tablets by mouth daily   Patient taking differently: Take 2 tablets by mouth daily as needed    sennosides (SENOKOT) 8.6 MG tablet   Yes No   Sig: Take 2 tablets by mouth daily   tamsulosin (FLOMAX) 0.4 MG capsule  Spouse/Significant Other No No   Sig: TAKE 1 CAPSULE BY MOUTH DAILY   ticagrelor (BRILINTA) 90 MG tablet  Spouse/Significant Other No No   Sig: Take 1 tablet (90 mg) by mouth 2 times daily      Facility-Administered Medications: None     Allergies   Allergies   Allergen Reactions     Oxycodone Other (See Comments)     \"TERRIBLE SWEATING\"     Sulfa Drugs      Tetracycline        Social History   Unable to obtain due to baseline dementia.     Family History   I have reviewed this patient's family history  Noted in EMR unable to verify due to baseline demntia.  Family History   Problem Relation Age of Onset     Breast Cancer Mother      Heart Failure Father 81       Review of Systems   Limited due to baseline dementia.     Physical Exam   Temp: 98  F (36.7  C) Temp src: Oral BP: (!) 79/50 Pulse: (!) 43 Heart Rate: (!) 45 Resp: 20 SpO2: 100 %      Vital Signs with Ranges  Temp:  [98  F (36.7  C)] 98  F (36.7  C)  Pulse:  [42-48] 43  Heart Rate:  [42-47] 45  Resp:  [15-20] 20  BP: ()/(42-56) 79/50  SpO2:  [95 %-100 %] 100 %  0 lbs 0 oz    Constitutional: Awake, alert, cooperative, no apparent distress.  Eyes: " Conjunctiva and EOM examined and normal.  HEENT: Moist mucous membranes, normal dentition.  Respiratory: Clear to auscultation bilaterally, no crackles or wheezing.  Cardiovascular: Regular rate and rhythm, normal S1 and S2, and no murmur noted.  GI: Soft, non-distended, Tender to deep palpation in LLQ, normal bowel sounds.  Lymph/Hematologic: No palpable adenopathy or unusual bleeding.  Skin: No rashes, no cyanosis, no edema.  Musculoskeletal: No joint swelling, erythema or tenderness.  Neurologic: AOx4, no gross focal deficits.  Psychiatric: Appropriate and cooperative, no obvious anxiety or depression.      Data   Data reviewed today:  I personally reviewed the EKG tracing showing sinus bradycardia and the CT head, chest/abd/pelvis image(s) showing minimal colonic diverticulitis.  Recent Labs   Lab 05/01/20  1005   WBC 9.8   HGB 10.8*   MCV 90      INR 1.03   *   POTASSIUM 4.8   CHLORIDE 98   CO2 25   BUN 19   CR 0.96   ANIONGAP 9   ALLISON 9.1   *   ALBUMIN 3.3*   PROTTOTAL 6.5*   BILITOTAL 0.9   ALKPHOS 97   ALT 18   AST 15   LIPASE 70*   TROPI 0.016       Recent Results (from the past 24 hour(s))   Head CT w/o contrast    Narrative    CT OF THE HEAD WITHOUT CONTRAST 5/1/2020 11:09 AM     COMPARISON: Head CT 4/20/2020    HISTORY: Fall, weakness, check for bleed.    TECHNIQUE: 5 mm thick axial CT images of the head were acquired  without IV contrast material.    FINDINGS:  There is moderate diffuse cerebral volume loss. There are  extensive confluent areas of decreased density in the cerebral white  matter bilaterally that are consistent with sequela of chronic small  vessel ischemic disease. A moderate-sized area of chronic  encephalomalacia at the anterolateral aspect of the left frontal lobe  also involving the anterior aspect of the left insula again noted  consistent with a prior chronic left MCA territory infarct.    The ventricles and basal cisterns are within normal limits  in  configuration given the degree of cerebral volume loss.  There is no  midline shift. There are no extra-axial fluid collections.     No intracranial hemorrhage, mass or recent infarct.    The visualized paranasal sinuses are well-aerated. There is no  mastoiditis. There are no fractures of the visualized bones.       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 Diffuse cerebral volume loss and cerebral white matter  changes consistent with chronic small vessel ischemic disease. No  evidence for acute intracranial pathology.       Radiation dose for this scan was reduced using automated exposure  control, adjustment of the mA and/or kV according to patient size, or  iterative reconstruction technique.    LISANDRO MAGALLON MD   CT Chest/Abdomen/Pelvis w Contrast    Narrative    CT CHEST/ABDOMEN/PELVIS WITH CONTRAST  5/1/2020 11:10 AM    CLINICAL HISTORY: Abdomen pain, vomiting, weakness, fall today, cough.  Check for appendicitis, small bowel obstruction, diverticulitis,  pancreatitis, traumatic injury, pneumonia.    TECHNIQUE: CT scan of the chest, abdomen, and pelvis was performed  following injection of IV contrast. Multiplanar reformats were  obtained. Dose reduction techniques were used.   CONTRAST: 74 mL [id number]    COMPARISON: 5/27/2018    FINDINGS:   LUNGS AND PLEURA: Mild centrilobular emphysema. Stable band of  scarring or atelectasis in the inferior lingula. Lungs are otherwise  clear. No pleural effusion or pneumothorax.    MEDIASTINUM/AXILLAE: No lymphadenopathy. Severe coronary artery  calcification.    HEPATOBILIARY: Cholecystectomy.    PANCREAS: Normal.    SPLEEN: Normal.    ADRENAL GLANDS: Normal.    KIDNEYS/BLADDER: Stable left renal cyst with partially calcified  septation. Stable nonobstructing renal calculi, the largest at the  lower pole of the left kidney measuring 8 mm in  diameter. No  hydronephrosis.    BOWEL: Diverticulosis of the descending and sigmoid colon. Minimal  inflammatory change around one of the diverticula in the proximal  sigmoid colon. No abscess or perforation. Normal appendix. No  obstruction.    PELVIC ORGANS: Normal.    ADDITIONAL FINDINGS: Diffuse atherosclerosis. 3 cm distal abdominal  aortic aneurysm.    MUSCULOSKELETAL: Chronic spondylolysis with anterolisthesis at L5-S1.  No acute fractures.      Impression    IMPRESSION:  1.  Minimal inflammation around a sigmoid colonic diverticula,  consistent with minimal diverticulitis.  2.  No evidence of a traumatic injury in the chest, abdomen, or  pelvis. No other acute findings.    LOUISE POSADAS MD

## 2020-05-01 NOTE — PROGRESS NOTES
RECEIVING UNIT ED HANDOFF REVIEW    ED Nurse Handoff Report was reviewed by: Renu Jeffers RN on May 1, 2020 at 1:43 PM

## 2020-05-01 NOTE — ED PROVIDER NOTES
History     Chief Complaint:  Fall      HPI   Dustin Pearce is a 89 year old male with a complicated medical history including paroxysmal atrial fibrillation, NSTEMI, type II diabetes mellitus, hypertension, and seizures who presents after a fall. EMS reported that the patient feel approximately 1 hour PTA after staff at his nursing home, [facility], checked on him. The patient was not able to get up on his own but eventually 3 staff were able to help him up. The patient told EMS that he felt like this fall was not due to a seizure instead he felt weak and then fell down. He denied hitting his head or losing consciousness. On examination, the patient stated that he had one episode of vomiting last night and this morning. He denied any headache, sore throat, shortness of breath, chest pain, diarrhea, or abdominal pain. The patient endorsed a cough but was not sure when it started and was unsure if he has had a fever recently or not. There are Covid-19 positive cases at his nursing home. The patient could not recall when his last seizure was however per chart review the patient was hospitalized at [facility] from 4/18-4/21/2020 for recurrent seizures after being evaluated in the ED for aphasia.     Allergies:  Oxycodone  Sulfa Drugs  Tetracycline     Medications:    Aspirin 81   Lipitor   Cymbalta   Lantus pen   Lacosamide   Keppra   Metformin   Metoprolol succinate   Nitrostat   Prilosec   Zofran   Flomax   Brilinta     Past Medical History:    Altered mental status  Anemia   Aortic stenosis  Hypertension   Coronary artery disease  Diabetes type 2    Carotid artery stenosis  Cognitive impairment  Depression   Gastro-oesophageal reflux disease  Heart attack  Hyperlipidemia   Ischemic cardiomyopathy  Nephrolithiasis  NSTEMI   Mild cognitive impairment   PAD  Paroxysmal atrial fibrillation  Stroke  Ventricular tachycardia   Syncope  TIA     Past Surgical History:    Amputate left foot  Cholecystectomy   Endarterectomy  Cartoid   EGD Combined  Genitourinary surgery - kidney stone removal x 4  HC UGI endoscopy with endoscopic US   Hernia repair   I&D foot combined - left   Laser holmium lithotripsy ureters, insert stent combined   Rotator cuff repair      Family History:    Mother: Breast cancer  Father: heart failure     Social History:  Smoking status: Former. Quit date: 1999  Alcohol use: No  Drug use: No  The patient presents to the emergency department via EMS  PCP: Matt Morrissey  Marital Status:   [4]    Review of Systems   Unable to perform ROS: Acuity of condition   Constitutional: Negative for fever.   HENT: Negative for sore throat.    Respiratory: Positive for cough. Negative for shortness of breath.    Cardiovascular: Negative for chest pain.   Gastrointestinal: Positive for nausea and vomiting. Negative for abdominal pain and diarrhea.   Neurological: Negative for headaches.       Physical Exam     Patient Vitals for the past 24 hrs:   BP Temp Temp src Pulse Heart Rate Resp SpO2   05/01/20 1245 130/46 -- -- (!) 39 (!) 39 13 --   05/01/20 1230 123/47 -- -- (!) 40 (!) 40 14 --   05/01/20 1215 127/58 -- -- (!) 44 (!) 43 21 100 %   05/01/20 1210 137/56 -- -- -- (!) 42 14 100 %   05/01/20 1200 (!) 79/50 -- -- (!) 43 (!) 45 20 --   05/01/20 1145 122/47 -- -- (!) 42 (!) 42 15 100 %   05/01/20 1130 122/47 -- -- (!) 42 (!) 42 12 100 %   05/01/20 1120 118/46 -- -- (!) 43 (!) 42 18 97 %   05/01/20 1030 116/43 -- -- (!) 46 -- -- 96 %   05/01/20 1015 119/42 -- -- (!) 47 -- -- 96 %   05/01/20 1000 126/44 98  F (36.7  C) Oral (!) 48 -- 16 96 %   05/01/20 0951 (!) 141/56 -- -- (!) 47 (!) 47 16 95 %       Physical Exam  Nursing note and vitals reviewed.  Constitutional:  Appears well-developed and well-nourished.   HENT:   Head:    Atraumatic.   Mouth/Throat:   Oropharynx is clear and moist. No oropharyngeal exudate.   Eyes:    Pupils are equal, round, and reactive to light.   Neck:    Normal range of motion. Neck supple. Non  tender neck.     No tracheal deviation present. No thyromegaly present.   Cardiovascular:  Normal rate, regular rhythm, no murmur   Pulmonary/Chest: Breath sounds are clear and equal without wheezes or crackles.  Abdominal:   Soft. Bowel sounds are normal. Exhibits no distension and      no mass. There is no tenderness.      There is no rebound and no guarding.   Musculoskeletal:  Exhibits no edema.   Lymphadenopathy:  No cervical adenopathy.   Neurological:   Drowsy resting with his eyes closed, but oriented to person, place. Easily arousable to verbal stimuli and follows commands.  GCS 15.   intact and equal.  Bilateral lower extremity strength weak but intact and equal.  Skin:    Skin is warm and dry. No rash noted. Slightly pale.      Emergency Department Course   ECG:  Indication: Fall  Time: 1024  Vent. Rate 46 bpm. SC interval 194. QRS duration 144. QT/QTc 514/449. P-R-T axis * -75 -89.  Sinus bradycardia. Left axis deviation. right  bundle branch block. T wave abnormality, consider lateral ischemia. Abnormal ECG. Read time: 1029    Imaging:  Radiographic findings were communicated with the patient who voiced understanding of the findings.    CT Head without contrast:   Probable chronic left middle cerebral artery territory   infarct again noted involving the anterolateral left frontal lobe and   left insula. Diffuse cerebral volume loss and cerebral white matter   changes consistent with chronic small vessel ischemic disease. No   evidence for acute intracranial pathology, as per radiology.    CT Chest/Abdomen/Pelvis w/ IV contrast:   1.  Minimal inflammation around a sigmoid colonic diverticula,   consistent with minimal diverticulitis.   2.  No evidence of a traumatic injury in the chest, abdomen, or   pelvis. No other acute findings, As per radiology.     Laboratory:  COVID-19 Virus PCR Nasopharyngeal swab: pending    CBC: WBC: 9.8, HGB: 10.8 (L), PLT: 242  CMP: Glucose 105 (H), NA: 132 (L), Albumin:  3.3 (L), Protein Total: 6.5 (L), o/w WNL (Creatinine: 0.96)  Blood Gas: PO2: 22 (L), o/w WNL      CRP: 6.8  INR: 1.03  Lipase: 70 (L)  Lacosamide level: pending  Procalcitonin: <0.05    1005 Lactic acid: 3.7 (H)  1318 Lactic acid: 2.8 (H)    1005 Troponin: 0.016    Blood cultures (X2): No growth after 4 hours    UA with Microscopic: Protein Albumin: 10, Leukocyte Esterase: Trace, RBC: 3 (H), o/w WNL  Urine Culture: pending.        Interventions:  1010 NS 1L IV    Zofran 4 mg IV   1053  Iopamidol 74 ml IV  1113 Zosyn 3.375 g IV   1224 NS 1L IV     Emergency Department Course:  Nursing notes and vitals reviewed. (0981) I performed an exam of the patient as documented above.     IV inserted. Medicine administered as documented above. Blood drawn. This was sent to the lab for further testing, results above.    The patient was sent for a head CT and chest/abdomen/pelvis CT while in the emergency department, findings above.   EKG obtained in the ED, see results above.      (1225) I rechecked the patient and discussed the results of his workup thus far. I also spoke with the patient's wife over the phone.      (1227)  I consulted with Dr. Solis of the hospitalist services. They are in agreement to accept the patient for admission.    (1246) I consulted with Dr. Jo, Cardiology, regarding the patient's history and presentation here in the emergency department.    Findings and plan explained to the Patient who consents to admission. Discussed the patient with Dr. Solis, who will admit the patient to a Med/surg bed for further monitoring, evaluation, and treatment.    Impression & Plan    Covid-19  Dustin Pearce was evaluated during a global COVID-19 pandemic, which necessitated consideration that the patient might be at risk for infection with the SARS-CoV-2 virus that causes COVID-19.   Applicable protocols for evaluation were followed during the patient's care.   COVID-19 was considered as part of the patient's  evaluation. The plan for testing is:  a test was obtained during this visit.    CMS Diagnoses:   The patient has signs of Severe Sepsis REMINDER: Please use septic shock SmartPhrase for Lactate > 4 or a patient requiring vasopressors after initial fluid bolus (meaning persistent hypotension)      If one the following conditions is present, a 30 mL/kg bolus is recommended as part of the 6 hour bundle (IBW can be used for BMI >30, or document refusal/contraindication):      1.   Initial hypotension  defined as 2 bps < 90 or map < 65 in the 6hrs before or 6hrs after time zero.     2.  Lactate >4.     The patient has signs of Severe Sepsis as evidenced by:    1. 2 SIRS criteria, AND  2. Suspected infection, AND   3. Organ dysfunction: Lactic Acid > 2.0    Time severe sepsis diagnosis confirmed: 1022 05/01/20 as this was the time when Lactate resulted, and the level was > 2.0    3 Hour Severe Sepsis Bundle Completion:    1. Initial Lactic Acid Result:   Recent Labs   Lab Test 05/01/20  1318 05/01/20  1005 05/28/18  0555   LACT 2.8* 3.7* 1.3     2. Blood Cultures before Antibiotics: Yes  3. Broad Spectrum Antibiotics Administered:  yes       Anti-infectives (From admission through now)    Start     Dose/Rate Route Frequency Ordered Stop    05/01/20 1042  piperacillin-tazobactam (ZOSYN) 3.375 g vial to attach to  mL bag      3.375 g  over 1 Hours Intravenous ONCE 05/01/20 1041 05/01/20 1245          4. Fluid volume administered in ED:  Full 30 mL/kg bolus given (see amount below).    BMI Readings from Last 1 Encounters:   04/30/20 23.76 kg/m      30 mL/kg fluids based on weight: 2,000 mL  30 mL/kg fluids based on IBW (must be >= 60 inches tall): 1,910 mL                Severe Sepsis reassessment:  1. Repeat Lactic Acid Level: 2.8  2. MAP>65 after initial IVF bolus, will continue to monitor fluid status and vital signs    I attest to having performed a repeat sepsis exam and assessment of perfusion at 13:00 and the  results demonstrate improved perfusion.      Medical Decision Making:  Dustin Pearce is a 89 year old male I found   I found him to have sigmoid diverticulitis which I feel is the cause of his weakness and fall. There is no sign of stroke, he has been worked up for TIAs twice in the past month with essentially unremarkable workups. He was experiencing sinus bradycardia here but his blood pressures were normal. I consulted cardiology, Dr. Jo, who recommended not to give him atropine because of his normal blood pressures. His metoprolol will be held and cardiology will be consulted for possible pacemaker placement. He does not have any know injuries from this fall. He has been coughing recently, so COVID-19 is in the differential and Covid-19 testing was sent. I discussed the case with his wife who states that he is a full code. He was admitted to the care of the hospitalist of the medical telemetry. He was exhibiting some signs of severe sepsis so he was rehydrated with normal saline and repeat lactic acid was ordered and is pending.     Diagnosis:    ICD-10-CM    1. Severe sepsis (H)  A41.9 UA with Microscopic    R65.20 Urine Culture     Blood culture     Blood culture     COVID-19 Virus (Coronavirus) by PCR Nasopharyngeal     SARS-CoV-2 COVID-19 Virus (Coronavirus) RT-PCR     SARS-CoV-2 COVID-19 Virus (Coronavirus) RT-PCR Nasopharyngeal     Lactic acid   2. Sigmoid diverticulitis  K57.32    3. Altered mental status, unspecified altered mental status type  R41.82    4. Sinus bradycardia  R00.1    5. Suspected Covid-19 Virus Infection  Z20.828        Disposition:  Admitted to Dr. Irma Ricoibjerod Disclosure:  OLIVER, Rosalba Walker, am serving as a scribe on 5/1/2020 at 9:48 AM to personally document services performed by Alexsandra Veras MD based on my observations and the provider's statements to me.       Rosalba Walker  5/1/2020    EMERGENCY DEPARTMENT       Alexsandra Veras MD  05/01/20 3745

## 2020-05-01 NOTE — PROGRESS NOTES
Pt alert to self on assessment upon admission into ICU, when re-oriented to place he was able to state later that he is at [facility], still d/o to situation and time.  Tele: sinus hayde in 40's, O2 stable on 2L NC.  Pt has bruise on coccyx that is intact, sacral mepilex on.  Follows commands, passed neuro check, sleepy in between cares.

## 2020-05-01 NOTE — PROGRESS NOTES
Pt's BG 55 and 56 upon admission to ICU, 240mL of orange juice given, BG 81 at recheck.  Hospitalist also to change maintenance fluids to D5NS.

## 2020-05-01 NOTE — PHARMACY-ADMISSION MEDICATION HISTORY
Pharmacy Medication History  Admission medication history interview status for the 5/1/2020  admission is complete. See EPIC admission navigator for prior to admission medications     Medication history sources: MAR (From [facility] [address])  Medication history source reliability: Good  Adherence assessment: Good    Significant changes made to the medication list:  None      Additional medication history information:   None    Medication reconciliation completed by provider prior to medication history? Yes    Time spent in this activity: 20 minutes      Prior to Admission medications    Medication Sig Last Dose Taking? Auth Provider   acetaminophen (TYLENOL) 500 MG tablet Take 1,000 mg by mouth every 6 hours as needed for mild pain  unknown Yes Unknown, Entered By History   aspirin EC 81 MG EC tablet Take 1 tablet (81 mg) by mouth daily 5/1/2020 at am Yes Tra Reynoso MD   atorvastatin (LIPITOR) 40 MG tablet Take 40 mg by mouth daily 5/1/2020 at am Yes Unknown, Entered By History   bisacodyl (DULCOLAX) 10 MG suppository Place 1 suppository (10 mg) rectally daily as needed for constipation unknown Yes Matt Morrissey MD   DULoxetine (CYMBALTA) 30 MG capsule TAKE 1 CAPSULE(30 MG) BY MOUTH DAILY 5/1/2020 at am Yes Matt Morrissey MD   insulin glargine (LANTUS PEN) 100 UNIT/ML pen Inject 26 Units Subcutaneous At Bedtime unknown Yes Unknown, Entered By History   ipratropium (ATROVENT) 0.03 % nasal spray INHALE 1 SPRAY IN EACH NOSTRIL EVERY 12 HOURS AS NEEDED unknown Yes Matt Morrissey MD   Lacosamide (VIMPAT) 100 MG TABS tablet Take 1 tablet (100 mg) by mouth 2 times daily 5/1/2020 at am Yes Smith Collado MD   levETIRAcetam (KEPPRA) 750 MG tablet Take 1 tablet (750 mg) by mouth 2 times daily 5/1/2020 at am Yes Patrizia Couch MD   MAGNESIUM-OXIDE 400 (241.3 Mg) MG tablet TAKE 1 TABLET BY MOUTH TWICE DAILY 5/1/2020 at am Yes Matt Morrissey MD   metFORMIN (GLUCOPHAGE) 1000 MG tablet TAKE 1 TABLET  BY MOUTH TWICE DAILY WITH MEALS 5/1/2020 at am Yes Matt Morrissey MD   metoprolol succinate ER (TOPROL-XL) 25 MG 24 hr tablet Take 12.5 mg by mouth At Bedtime unknown Yes Unknown, Entered By History   nitroGLYcerin (NITROSTAT) 0.4 MG sublingual tablet For chest pain place 1 tablet under the tongue every 5 minutes for 3 doses. If symptoms persist 5 minutes after 1st dose call 911. unknown Yes Brandyn Graves PA   omeprazole (PRILOSEC) 40 MG DR capsule TAKE 1 CAPSULE(40 MG) BY MOUTH DAILY 30 TO 60 MINUTES BEFORE A MEAL 5/1/2020 at am Yes Matt Morrissey MD   ondansetron (ZOFRAN) 4 MG tablet Take by mouth 3 times daily as needed for nausea  unknown Yes Reported, Patient   polyethylene glycol (MIRALAX) packet Take 1 packet by mouth 2 times daily as needed for constipation unknown Yes Reported, Patient   polyethylene glycol-propylene glycol (SYSTANE ULTRA) 0.4-0.3 % SOLN ophthalmic solution Place 2 drops into both eyes daily as needed for dry eyes unknown Yes Unknown, Entered By History   sennosides (SENOKOT) 8.6 MG tablet Take 2 tablets by mouth daily Hold for loose stools 5/1/2020 at am Yes Reported, Patient   tamsulosin (FLOMAX) 0.4 MG capsule TAKE 1 CAPSULE BY MOUTH DAILY 5/1/2020 at am Yes Matt Morrissey MD   ticagrelor (BRILINTA) 90 MG tablet Take 1 tablet (90 mg) by mouth 2 times daily 5/1/2020 at am Yes Matt Morrissey MD   blood glucose monitoring (NO BRAND SPECIFIED) meter device kit Use to test blood sugar bid times daily or as directed.   Matt Morrissey MD   order for DME Equipment being ordered: wheeled walker with hand breaks and seat   Matt Morrissey MD   order for DME Equipment being ordered: True Matrix Blood Glucose meter.   Matt Morrissey MD   TRUE METRIX BLOOD GLUCOSE TEST test strip USE TO TEST THREE TIMES DAILY OR AS DIRECTED   Matt Morrissey MD

## 2020-05-02 NOTE — PROGRESS NOTES
[facility]    Medicine Progress Note - Hospitalist Service       Date of Admission:  5/1/2020  Assessment & Plan   Dustin Pearce is a 89 year old male with PMH as outlined as well as pertinent medical hx of recent admission to [facility] (4/18-4/21/20) for eval of weakness and aphasia, DM type 2, CAD, L. Carotid artery stenosis, seizure disorder and paroxysmal A-fib who presents from nursing home following a fall.     Diverticulitis  Noted on CT abd, sig for minimal inflammation. No leukocytosis or abd tenderness initially, now with mild left lower quadrant tenderness.  - Will continue treatment with Zosyn.  - IVF at 75cc/hr given HFrEF.  - Given minimal inflammation, continue CLD as tolerated.     Bradycardia  ECG remarkable for sinus bradycardia, prior ECGs have captured this but not persistent. Pt is on metoprolol succinate 12.5mg daily. ED provider discussed case with Cardiology re utility of atropine and the decision was made to defer for now, given stable BP.  - Cardiology consulted, appreciate their assistance.  - Heart rate improved, mainly in the low 60s now.  - Continue to hold metoprolol.  - Monitor on remote telemetry.    COVID-19 ruled out  - He has had two negative COVID-19 tests.     Fall  Weakness  Pt was found on the ground by staff at nursing home, per Pt there was no LOC, CT head show no hemorrhage or acute pathology; CT chest, abd/pelvis showed no acute fracture. It is possible that ongoing weakness which was one of the presenting symptoms during most recent hospitalization is due to bradycardia noted again during this presentation.  - Treatment for bradycardia and infection as noted above.  - PT/OT consulted.     Lactic acidosis  Possibly secondary to tissue hypoperfusion as a result of volume depletion in setting of diverticulitis.  - Treat diverticulitis as noted above.  - Continue IV fluids as noted above.  - Lactic acid trended down.    Hyponatremia  Na 132 on presentation;  likely hypovolemic hyponatremia.   - Improved with IV fluids, up to 136 this morning.  - Continue IV fluids as above and recheck in AM.     Anemia  Hgb 10.8, normocytic and chronic, with baseline approx 12. No evidence of active bleed.  - Hemoglobin down to 9.7 this morning, likely dilutional related to IV fluids.  - Recheck in AM.     Recurrent seizure  - Hospitalized in April for recurrent seizures.  - Vimpat was increased during that admission, continue increased dose.  - Continue PTA keppra.    Known chronic left ICA occlusion  CAD with ischemic CM  - Continue PTA aspirin, atorvastatin, ticagrelor, and metoprolol.    Diabetes mellitus, type 2  - Hold PTA metformin.  - Continue PTA lantus 26 units at bedtime.  - Continue accuchecks and sliding scale NovoLog.    BPH  - Continue PTA tamsulosin.    GERD  - Continue PTA omeprazole.    Anxiety and depression  - Continue PTA duloxetine.        Diet: Clear Liquid Diet    DVT Prophylaxis: Pneumatic Compression Devices  Luis Catheter: not present  Code Status: Full Code      Disposition Plan   Expected discharge: transfer out of ICU today  Entered: Jesus Alberto Barrios MD 05/02/2020, 12:44 PM       The patient's care was discussed with the Bedside Nurse, Patient and Patient's significant other.    Jesus Alberto Barrios MD  Hospitalist Service  [facility]    ______________________________________________________________________    Interval History   Dustin ALEJANDRA Pearce feels better today. Hasn't really been out of bed yet. Denies fevers, chest pain, shortness of breath, nausea, abdominal pain, diarrhea. Tolerating clear liquids. Updated his significant other, Halina, by phone today.    Data reviewed today: I reviewed all medications, new labs and imaging results over the last 24 hours. I personally reviewed no images or EKG's today.    Physical Exam   Vital Signs: Temp: 97.3  F (36.3  C) Temp src: Oral BP: (!) 163/58 Pulse: 60 Heart Rate: 59 Resp: 14 SpO2: 95 % O2  Device: None (Room air) Oxygen Delivery: 3 LPM  Weight: 149 lbs .5 oz  Constitutional: awake, alert, cooperative, no apparent distress  Respiratory: clear to auscultation bilaterally, no crackles or wheezing  Cardiovascular: regular rate and rhythm, normal S1 and S2, no murmur noted  GI: normal bowel sounds, soft, non-distended, mild left lower quadrant tenderness  Skin: warm, dry  Musculoskeletal: no lower extremity pitting edema present  Neurologic: awake, alert, oriented to name, place and time    Data   Recent Labs   Lab 05/02/20  0629 05/02/20  0515 05/01/20  1005   WBC 6.0 Canceled, Test credited, specimen discarded 9.8   HGB 9.7* Canceled, Test credited, specimen discarded 10.8*   MCV 91 Canceled, Test credited, specimen discarded 90    Canceled, Test credited, specimen discarded 242   INR  --   --  1.03   NA  --  136 132*   POTASSIUM  --  4.8 4.8   CHLORIDE  --  107 98   CO2  --  26 25   BUN  --  14 19   CR  --  0.86 0.96   ANIONGAP  --  3 9   ALLISON  --  8.2* 9.1   GLC  --  108* 105*   ALBUMIN  --   --  3.3*   PROTTOTAL  --   --  6.5*   BILITOTAL  --   --  0.9   ALKPHOS  --   --  97   ALT  --   --  18   AST  --   --  15   LIPASE  --   --  70*   TROPI  --   --  0.016     Medications     dextrose 5% and 0.9% NaCl 75 mL/hr at 05/02/20 1043       aspirin  81 mg Oral Daily     atorvastatin  40 mg Oral Daily     DULoxetine  30 mg Oral Daily     insulin aspart  1-7 Units Subcutaneous TID AC     insulin aspart  1-5 Units Subcutaneous At Bedtime     insulin glargine  26 Units Subcutaneous At Bedtime     Lacosamide  100 mg Oral BID     levETIRAcetam  750 mg Oral BID     magnesium oxide  400 mg Oral BID     omeprazole  40 mg Oral QAM     piperacillin-tazobactam  3.375 g Intravenous Q6H     sennosides  2 tablet Oral Daily     sodium chloride (PF)  3 mL Intracatheter Q8H     tamsulosin  0.4 mg Oral Daily     ticagrelor  90 mg Oral BID

## 2020-05-02 NOTE — PROGRESS NOTES
Pt cleared to Tx to [facility] per hospitalist.  VSS, RA.  Pt more A&O today but remains d/o time, using paddle call light d/t macular degeneration.  Tele: SR c/ BBB occ PVC.  Good UOP via urinal, BM x1.  Spouse Halina called and updated on Tx to room 245.

## 2020-05-02 NOTE — PROVIDER NOTIFICATION
[facility] micro lab called w/ negative result for Covid for pt.  Hospitalist notified and pt's isolation d/c'd.

## 2020-05-02 NOTE — CONSULTS
CARDIOLOGY CONSULT    REASON FOR CONSULT: bradycardia      PRIMARY CARE PHYSICIAN:  Matt Morrissey    HISTORY OF PRESENT ILLNESS:  Mr. Pearce is a 89 y/o gentleman with PMH significant for recent hospitalization of 4/18/2020-4/21/2020 for evaluation of weakness and aphasia felt secondary to recurrent seizure.  He was again admitted yesterday following a fall.  Dustin tells me he has had many falls in the past several weeks.  He states he feels weak and his legs give out.  He denies any exertional chest pain, chest discomfort.  He denies any orthopnea, PND or lower extremity edema.    In the ED he was noted to have brief SBP in the 70's improved with fluids.  His lactate was initially 3.7 and downtrending.  CT scan showed findings consistent with diverticulitis.  ECG demonstrated sinus bradycardia and heart rates were noted to be consistently in the upper 30's to lower 40's.  His outpatient metoprolol was held and his heart rates improved overnight.        PAST MEDICAL HISTORY:  1.  Ischemic cardiomyopathy:  EF 30-35%  2.  Coronary artery disease:  S/P NSTEMI in 2017.  Coronary angiogram revealed severe 3V CAD with chronically occluded RCA, severe pLAD and LCx.  Both lesions successfully stented with MODESTA  3.  Hx of CVA  4.  Peripheral arterial disease s/p right carotid endarterectomy  5.  Left carotid occlusion  6.  Left common iliac stenting and left SFA angioplasty and left axillary and subclavian stenosis  7.  Hx of spells:  Transient loss of consciousness in one episode, stiff and garbled speech in others.  ZIO patch demonstrated 3 episodes of NSVT.  Loop recorder was discussed and patient declined  8.  Seizure disorder  9.  Anemia      MEDICATIONS:  Current Facility-Administered Medications   Medication     acetaminophen (TYLENOL) tablet 1,000 mg     aspirin EC tablet 81 mg     atorvastatin (LIPITOR) tablet 40 mg     bisacodyl (DULCOLAX) Suppository 10 mg     dextrose 5% and 0.9% NaCl infusion     glucose gel  "15-30 g    Or     dextrose 50 % injection 25-50 mL    Or     glucagon injection 1 mg     DULoxetine (CYMBALTA) DR capsule 30 mg     hydrALAZINE (APRESOLINE) injection 10 mg     insulin aspart (NovoLOG) injection (RAPID ACTING)     insulin aspart (NovoLOG) injection (RAPID ACTING)     insulin glargine (LANTUS PEN) injection 26 Units     ipratropium (ATROVENT) 0.03 % spray 1 spray     lacosamide (VIMPAT) tablet 100 mg     levETIRAcetam (KEPPRA) tablet 750 mg     lidocaine (LMX4) cream     lidocaine 1 % 0.1-1 mL     magnesium oxide (MAG-OX) tablet 400 mg     melatonin tablet 1 mg     naloxone (NARCAN) injection 0.1-0.4 mg     nitroGLYcerin (NITROSTAT) sublingual tablet 0.4 mg     omeprazole (priLOSEC) CR capsule 40 mg     ondansetron (ZOFRAN) tablet 4 mg     piperacillin-tazobactam (ZOSYN) 3.375 g vial to attach to  mL bag     polyethylene glycol (MIRALAX) Packet 17 g     polyethylene glycol-propylene glycol (SYSTANE ULTRA) ophthalmic solution 2 drop     sennosides (SENOKOT) tablet 2 tablet     sennosides (SENOKOT) tablet 2 tablet     sodium chloride (PF) 0.9% PF flush 3 mL     sodium chloride (PF) 0.9% PF flush 3 mL     tamsulosin (FLOMAX) capsule 0.4 mg     ticagrelor (BRILINTA) tablet 90 mg       ALLERGIES:  Allergies   Allergen Reactions     Oxycodone Other (See Comments)     \"TERRIBLE SWEATING\"     Sulfa Drugs      Tetracycline        SOCIAL HISTORY:  Remote smoking hx.  No ETOH.    FAMILY HISTORY:  I have reviewed this patient's family history and updated it with pertinent information if needed.   Family History   Problem Relation Age of Onset     Breast Cancer Mother      Heart Failure Father 81       REVIEW OF SYSTEMS:  A complete ROS was obtained and the pertinent positives are outlined in the history of present illness above.  The remainder of systems is negative.      PHYSICAL EXAM:  Temp: 97.3  F (36.3  C) Temp src: Oral BP: (!) 172/65 Pulse: 64 Heart Rate: 67 Resp: 18 SpO2: 93 % O2 Device: None (Room " air) Oxygen Delivery: 3 LPM  Vital Signs with Ranges  Temp:  [97.3  F (36.3  C)-97.7  F (36.5  C)] 97.3  F (36.3  C)  Pulse:  [39-65] 64  Heart Rate:  [38-67] 67  Resp:  [9-22] 18  BP: ()/() 172/65  SpO2:  [88 %-100 %] 93 %  149 lbs .5 oz    Constitutional: awake, alert, no distress  Eyes: PERRL, sclera nonicteric  ENT: trachea midline  Respiratory: CTAB  Cardiovascular: II/VI midsystolic murmur, no JVD  GI: nondistended, nontender, bowel sounds present  Lymph/Hematologic: no lymphadenopathy  Skin: dry, no rash  Musculoskeletal: good muscle tone, no edema bilaterally  Neurologic: no focal deficits  Neuropsychiatric: appropriate affact    DATA:  Labs:   Reviewed in EPIC    EKG:  Dated 5/1/2020 reviewed personally.  Sinus bradycardia with RBBB, T wave inversions laterally (seen on previous ECGs)      Echocardiogram 4/2020  A contrast injection (Bubble Study) was performed that was negative for flow  across the interatrial septum.  The visual ejection fraction is estimated at 30-35%.  Left ventricular systolic function is moderately reduced.  There are regional wall motion abnormalities as specified.  There is mild mitral stenosis.  Moderate valvular aortic stenosis.  The ascending aorta is Mildly dilated.  The study was technically difficult.  _____________________________________________________________________________    ASSESSMENT:  1.  Sinus bradycardia: Heart rates 30-40's.  Metoprolol held and heart rates now in the upper 50's to low 60's  2.  Moderate aortic stenosis  3.  Ischemic cardiomyopathy:  EF 30-35%  4.  Diverticulitis:  On zosyn  5.  Hypovolemia:  Receiving cautious fluids  6.  Generalized weakness:  Likely multifactorial in the setting of advanced age and numerous comorbidities.  Bradycardia would certainly have been playing a role    RECOMMENDATIONS:  1.  Agree with discontinuing metoprolol  2.  Continue telemetry; consider further monitoring in the outpatient setting  3.  Heart rates  improved with stopping metoprolol; no clear indication for pacemaker at this point      Millicent Shah MD  Cardiology - [facility]  Pager:  [phone]  May 2, 2020

## 2020-05-02 NOTE — PLAN OF CARE
VSS, tele SR with BBB, pt denies pain.  A&Ox3 and disoriented to situation.  Padded call light used due to macular degeneration.  Pt reports feeling better, but declined to get up at this time.  Repositioned frequently.  Voiding per urinal.  Continue to monitor.

## 2020-05-03 NOTE — PLAN OF CARE
VSS. A/Ox4. Forgetful. Clear liquid diet. Venetie IRA. Denies chest pain, SOB, no cough. Tele reads BBB. Has bruise on coccyx, covered by mepilex. Some bruising on arms as well. Has an IV in left and right AC. Voiding in urinal. BG, most recent was 92. PT consult today.

## 2020-05-03 NOTE — PROGRESS NOTES
" [facility]    Cardiology Progress Note    Date of Service (when I saw the patient): 05/03/2020            Assessment and Plan:   ASSESSMENT:  1.  Sinus bradycardia on admission: Heart rates 30-40's.  Metoprolol held and heart rates now in the upper 50's to low 60's  2.  Moderate aortic stenosis  3.  Ischemic cardiomyopathy:  EF 30-35%  4.  Diverticulitis:  On zosyn  5.  Hypovolemia:  Receiving cautious fluids  6.  Generalized weakness:  Likely multifactorial in the setting of advanced age and numerous comorbidities.  Bradycardia would certainly have been playing a role     RECOMMENDATIONS:  1.  Avoid further AV iveth blocking agents.  Reasonable to resume low dose lisinopril 5 mg daily for BP control (was on this previously)  2.  14 day zio patch monitor at discharge.  Follow up with cardiology [facility] in 2 weeks for virtual visit.   3.  Cardiology will sign off.       Millicent Shah MD   [facility]        Interval History:     Patient reports feeling \"poorly\".  He notes ongoing dizziness.  HR's have been NSR, no ongoing bradycardia.  No chest pain, no shortness of breath.         Medications:       aspirin  81 mg Oral Daily     atorvastatin  40 mg Oral Daily     DULoxetine  30 mg Oral Daily     insulin aspart  1-7 Units Subcutaneous TID AC     insulin aspart  1-5 Units Subcutaneous At Bedtime     insulin glargine  26 Units Subcutaneous At Bedtime     Lacosamide  100 mg Oral BID     levETIRAcetam  750 mg Oral BID     magnesium oxide  400 mg Oral BID     omeprazole  40 mg Oral QAM     piperacillin-tazobactam  3.375 g Intravenous Q6H     sennosides  2 tablet Oral Daily     sodium chloride (PF)  3 mL Intracatheter Q8H     tamsulosin  0.4 mg Oral Daily     ticagrelor  90 mg Oral BID            Physical Exam:   Blood pressure (!) 172/77, pulse 93, temperature 98  F (36.7  C), temperature source Oral, resp. rate 18, height 1.674 m (5' 5.91\"), weight 65.8 kg (145 lb), SpO2 94 %.  Vitals:    " 20 0000 20 1714 20 0536   Weight: 67.6 kg (149 lb 0.5 oz) 68.6 kg (151 lb 3.2 oz) 65.8 kg (145 lb)       Intake/Output Summary (Last 24 hours) at 5/3/2020 1039  Last data filed at 5/3/2020 0700  Gross per 24 hour   Intake 660 ml   Output 2065 ml   Net -1405 ml           Vital Sign Ranges  Temperature Temp  Av.6  F (36.4  C)  Min: 97.3  F (36.3  C)  Max: 98  F (36.7  C)   Blood pressure Systolic (24hrs), Av , Min:115 , Max:173        Diastolic (24hrs), Av, Min:46, Max:108      Pulse Pulse  Av.6  Min: 59  Max: 93   Respirations Resp  Av.5  Min: 13  Max: 21   Pulse oximetry SpO2  Av.9 %  Min: 91 %  Max: 96 %         EXAM:    Constitutional:    in no apparent distress    Skin:    normal    Head:    Normocephalic, atramatic    Eyes:    pupils equal, round and reactive to light, extra ocular muscles intact, sclera clear, conjunctiva normal    Neck:    JVP    Lungs:    CTAB   Cardiovascular:    S1, S2 II/VI mid systolic murmur, no JVD   Abdomen:    normal bowel sounds    Extremities and Back:    no cyanosis or clubbing and No edema bilaterally   Neurological:    No gross or focal neurologic abnormalities               Data:     Recent Labs   Lab Test 20  0555 20  0629  20  1005  20  1944   WBC 7.8 6.0   < > 9.8   < > 8.1   HGB 11.3* 9.7*   < > 10.8*   < > 11.9*   MCV 89 91   < > 90   < > 90    193   < > 242   < > 228   INR  --   --   --  1.03  --  1.08    < > = values in this interval not displayed.      Recent Labs   Lab Test 20  0555 20  0515    136   POTASSIUM 3.8 4.8   CHLORIDE 105 107   BUN 7 14   CR 0.78 0.86     Recent Labs   Lab 20  0555 20  0515 20  1005   GLC 92 108* 105*     Recent Labs   Lab Test 20  1005 20  0014   ALT 18 19   AST 15 26     Troponin I ES   Date Value Ref Range Status   2020 0.016 0.000 - 0.045 ug/L Final     Comment:     The 99th percentile for upper reference  range is 0.045 ug/L.  Troponin values   in the range of 0.045 - 0.120 ug/L may be associated with risks of adverse   clinical events.     04/19/2020 0.034 0.000 - 0.045 ug/L Final     Comment:     The 99th percentile for upper reference range is 0.045 ug/L.  Troponin values   in the range of 0.045 - 0.120 ug/L may be associated with risks of adverse   clinical events.     04/19/2020 0.035 0.000 - 0.045 ug/L Final     Comment:     The 99th percentile for upper reference range is 0.045 ug/L.  Troponin values   in the range of 0.045 - 0.120 ug/L may be associated with risks of adverse   clinical events.     04/19/2020 0.042 0.000 - 0.045 ug/L Final     Comment:     The 99th percentile for upper reference range is 0.045 ug/L.  Troponin values   in the range of 0.045 - 0.120 ug/L may be associated with risks of adverse   clinical events.     03/06/2020 0.055 (H) 0.000 - 0.045 ug/L Final     Comment:     The 99th percentile for upper reference range is 0.045 ug/L.  Troponin values   in the range of 0.045 - 0.120 ug/L may be associated with risks of adverse   clinical events.           Recent Labs   Lab Test 08/29/19  0542 08/28/19  1404 08/28/19  0724   MAG 2.0 2.6* 1.5*       Lab Results   Component Value Date    CHOL 146 03/06/2020     Lab Results   Component Value Date    HDL 52 03/06/2020     Lab Results   Component Value Date    LDL 69 03/06/2020     Lab Results   Component Value Date    TRIG 123 03/06/2020     Lab Results   Component Value Date    CHOLHDLRATIO 5.1 12/18/2015          TSH   Date Value Ref Range Status   06/03/2018 4.45 (H) 0.40 - 4.00 mU/L Final           Millicent Shah MD, FACC  Cardiology

## 2020-05-03 NOTE — PROGRESS NOTES
[facility]    Medicine Progress Note - Hospitalist Service       Date of Admission:  5/1/2020  Assessment & Plan   Dustin Pearce is a 89 year old male with PMH as outlined as well as pertinent medical hx of recent admission to [facility] (4/18-4/21/20) for eval of weakness and aphasia, DM type 2, CAD, L. Carotid artery stenosis, seizure disorder and paroxysmal A-fib who presents from nursing home following a fall.     Diverticulitis  Noted on CT abd, sig for minimal inflammation. No leukocytosis or abd tenderness initially, now with mild left lower quadrant tenderness.  - Will continue treatment with Zosyn.  - IVF discontinued.  - Will advance diet as tolerated today.  - Possible transition to oral antibiotics and discharge back to [place] tomorrow.     Bradycardia  ECG remarkable for sinus bradycardia, prior ECGs have captured this but not persistent. Pt is on metoprolol succinate 12.5mg daily. ED provider discussed case with Cardiology re utility of atropine and the decision was made to defer for now, given stable BP.  - Cardiology consulted, appreciate their assistance.  - Heart rate improved, now in the 70s-80s.  - Continue to hold metoprolol and avoid AV iveth blocking agents.  - Monitor on remote telemetry.  - Needs 14 day Zio patch at discharge and outpatient follow-up with cardiology in 2 weeks.    COVID-19 ruled out  - He has had two negative COVID-19 tests during this admission.     Fall  Weakness  Pt was found on the ground by staff at nursing home, per Pt there was no LOC, CT head show no hemorrhage or acute pathology; CT chest, abd/pelvis showed no acute fracture. It is possible that ongoing weakness which was one of the presenting symptoms during most recent hospitalization is due to bradycardia noted again during this presentation.  - Treatment for bradycardia and infection as noted above.  - PT/OT consulted.  - Was at [place] prior to admission, still has a bed hold there, may be able  to return there tomorrow.     Lactic acidosis  Possibly secondary to tissue hypoperfusion as a result of volume depletion in setting of diverticulitis.  - Treat diverticulitis as noted above.  - Received IV fluids, subsequently discontinued.  - Lactic acid trended down.    Hyponatremia  Na 132 on presentation; likely hypovolemic hyponatremia.   - Resolved with IV fluids, up to 137 this morning.  - IV fluids discontinued.  - Recheck in AM.    Hypertension  - Blood pressures running high.  - Metoprolol discontinued due to bradycardia.  - Start lisinopril 5 mg PO daily.     Anemia  Hgb 10.8, normocytic and chronic, with baseline approx 12. No evidence of active bleed.  - Hemoglobin down to 9.7 on 5/2, then up to 11.3 on 5/3.  - Recheck in AM.     Recurrent seizure  - Hospitalized in April for recurrent seizures.  - Vimpat was increased during that admission, continue the increased dose.  - Continue PTA keppra.    Known chronic left ICA occlusion  CAD with ischemic CM  - Continue PTA aspirin, atorvastatin, ticagrelor.  - Metoprolol discontinued due to bradycardia as noted above.    Diabetes mellitus, type 2  - Hold PTA metformin.  - Continue PTA lantus 26 units at bedtime.  - Continue accuchecks and sliding scale NovoLog.  - Blood sugars fairly well controlled.    BPH  - Continue PTA tamsulosin.    GERD  - Continue PTA omeprazole.    Anxiety and depression  - Continue PTA duloxetine.       Diet: Regular Diet Adult    DVT Prophylaxis: Pneumatic Compression Devices  Luis Catheter: not present  Code Status: Full Code      Disposition Plan   Expected discharge: possible discharge back to [place] tomorrow  Entered: Jesus Alberto Barrios MD 05/03/2020, 12:56 PM       The patient's care was discussed with the Bedside Nurse, Care Coordinator/, Patient and Patient's Family.    Jesus Alberto Barrios MD  Hospitalist Service  [facility]  Hospital    ______________________________________________________________________    Interval History   Dustin Pearce feels OK this morning. No new complaints/concerns. Denies fevers, chest pain, shortness of breath, nausea, abdominal pain, and diarrhea. Tolerating liquids. Feels hungry. Hasn't been up out of bed much. Updated his significant other, Halina, by phone today.    Data reviewed today: I reviewed all medications, new labs and imaging results over the last 24 hours. I personally reviewed no images or EKG's today.    Physical Exam   Vital Signs: Temp: 98  F (36.7  C) Temp src: Oral BP: (!) 172/77 Pulse: 93 Heart Rate: 84 Resp: 18 SpO2: 95 % O2 Device: None (Room air)    Weight: 145 lbs 0 oz  Constitutional: awake, alert, cooperative, no apparent distress  Respiratory: clear to auscultation bilaterally, no crackles or wheezing  Cardiovascular: regular rate and rhythm, normal S1 and S2, no murmur noted  GI: normal bowel sounds, soft, non-distended, non-tender  Skin: warm, dry  Musculoskeletal: no lower extremity pitting edema present  Neurologic: awake, alert, answers questions appropriately    Data   Recent Labs   Lab 05/03/20  0555 05/02/20  0629 05/02/20  0515 05/01/20  1005   WBC 7.8 6.0 Canceled, Test credited, specimen discarded 9.8   HGB 11.3* 9.7* Canceled, Test credited, specimen discarded 10.8*   MCV 89 91 Canceled, Test credited, specimen discarded 90    193 Canceled, Test credited, specimen discarded 242   INR  --   --   --  1.03     --  136 132*   POTASSIUM 3.8  --  4.8 4.8   CHLORIDE 105  --  107 98   CO2 27  --  26 25   BUN 7  --  14 19   CR 0.78  --  0.86 0.96   ANIONGAP 5  --  3 9   ALLISON 8.5  --  8.2* 9.1   GLC 92  --  108* 105*   ALBUMIN  --   --   --  3.3*   PROTTOTAL  --   --   --  6.5*   BILITOTAL  --   --   --  0.9   ALKPHOS  --   --   --  97   ALT  --   --   --  18   AST  --   --   --  15   LIPASE  --   --   --  70*   TROPI  --   --   --  0.016     Medications       aspirin   81 mg Oral Daily     atorvastatin  40 mg Oral Daily     DULoxetine  30 mg Oral Daily     insulin aspart  1-7 Units Subcutaneous TID AC     insulin aspart  1-5 Units Subcutaneous At Bedtime     insulin glargine  26 Units Subcutaneous At Bedtime     Lacosamide  100 mg Oral BID     levETIRAcetam  750 mg Oral BID     lisinopril  5 mg Oral Daily     magnesium oxide  400 mg Oral BID     omeprazole  40 mg Oral QAM     piperacillin-tazobactam  3.375 g Intravenous Q6H     sennosides  2 tablet Oral Daily     sodium chloride (PF)  3 mL Intracatheter Q8H     tamsulosin  0.4 mg Oral Daily     ticagrelor  90 mg Oral BID

## 2020-05-03 NOTE — CONSULTS
Care Transition Initial Assessment -      Met with: Patient's significant other Halina     Active Problems:    Diverticulitis       DATA  Lives With: facility resident(Xiao)    Quality of Family Relationships: involved, supportive  Description of Support System: Supportive, Involved  Who is your support system?: Significant Other  Support Assessment: Adequate family and caregiver support.   Identified issues/concerns regarding health management:   Quality of Family Relationships: involved, supportive     Per care transitions consult for discharge planning.  Patient was admitted on 5-1-20 after a fall.  The tentative date of discharge is 5-4-20.  Reviewed chart and call placed to patient's significant other Halina to discuss discharge plans.  Per patient's significant's other's report, patient was admitted from [place].  Halina states that she is planning on having patient return to [facility] on discharge and she is paying for a bed hold.  Explained to Halina that I will send a referral to confirm the bed hold and that we will follow up with her on the day of discharge.  Halina is in agreement.  Referral to [facility], via discharge on the double, to check the bed hold status and to begin the discharge planning process.    ASSESSMENT  Cognitive Status:  Did not meet patient, spoke with Halina, patient's significant other on the phone  Concerns to be addressed: discharge planning, return to [facility] on disharge.     PLAN  Financial costs for the patient includes N/A.  Patient given options and choices for discharge Return to [facility] on discharge.  Patient/family is agreeable to the plan?  Yes  Transportation/person available to transport on day of discharge  is [facility], if able and have they been notified/set up TBD  Patient Goals and Preferences: return to [facility] on discharge.  Patient anticipates discharging to:  [facility].    Will confirm the bed hold at [facility], continue to follow, and assist with a safe discharge plan.      Fatemeh  ESAU Julio, [facility]  Lead   [phone]  [facility]

## 2020-05-03 NOTE — PLAN OF CARE
Pleasant gentleman A&O x3, disoriented to situation. AVSS on RA. Tele SR with 1st degree AVB and BBB, pt denies pain.  D/t macular degeneration pt needs support with ordering meals and also using soft-touch call light. Pt reports feeling better, denies pain and nausea.  Repositioned frequently.  Using urinal. Foam on coccyx c/d/I, bruising. Continue to monitor.

## 2020-05-04 NOTE — PROGRESS NOTES
Disoriented to situation. Tele: SR w/ BBB. Decreased vision and hearing. Elevated BP in the AM, MD ordered lisinopril, given, effective. Other VSS on RA. Denies pain. Rico regular diet well. Plan is to transition IV abx to oral abx for possible discharge back to [facility] tomorrow.

## 2020-05-04 NOTE — PROGRESS NOTES
HUBER    I) Spoke with Meseret from [facility] on [address]. They can accept patient back today, even though there is no official bed hold in place. We have set a tentative [facility] transport time at 1500.     P) Will send orders and update patient and significant other once discharge is definite.    Update  Discharge orders are completed and have been sent via In Basket to [facility]. Updated patient's significant other, Halina who agrees to this plan.     Plan: Discharge to [facility] on [facility] today at 1500.

## 2020-05-04 NOTE — PLAN OF CARE
Discharge Planner OT   Patient plan for discharge: Return to [facility] per significant other, according to  notes  Current status: OT orders received, chart reviewed. [facility]. Plan is for tentative discharge today back to [place] per chart. Will defer OT intervention to TCU as it is anticipated he will return to TCU in the next 24-48 hours.  Barriers to return to prior living situation: Planning to return to TCU   Recommendations for discharge: Return to TCU  Rationale for recommendations: Return to TCU to continue inpatient therapies there.       Entered by: Rosa Nesbitt 05/04/2020 8:28 AM

## 2020-05-04 NOTE — DISCHARGE SUMMARY
[facility]    Discharge Summary  Hospitalist    Date of Admission:  5/1/2020  Date of Discharge:  5/4/2020  Discharging Provider: Patrizia Couch MD    Discharge Diagnoses    Diverticulitis  Severe sepsis due to diverticulitis    History of Present Illness   Please review admission h and p.    Hospital Course   Dustin Pearce was admitted on 5/1/2020.  The following problems were addressed during his hospitalization:    Active Problems:    Diverticulitis  Dustin Pearce is a 89 year old male with PMH as outlined as well as pertinent medical hx of recent admission to [facility] (4/18-4/21/20) for eval of weakness and aphasia, DM type 2, CAD, L. Carotid artery stenosis, seizure disorder and paroxysmal A-fib who presents from nursing home following a fall.     Diverticulitis  Noted on CT abd, sig for minimal inflammation. No leukocytosis or abd tenderness initially, now with mild left lower quadrant tenderness.  - Will continue treatment with Zosyn.  Patient was treated with zosyn, transitioned to Augmentin on discharge once symptoms improved.     Bradycardia  ECG remarkable for sinus bradycardia, prior ECGs have captured this but not persistent. Pt is on metoprolol succinate 12.5mg daily. ED provider discussed case with Cardiology re utility of atropine and the decision was made to defer for now, given stable BP.  - Cardiology consulted, appreciate their assistance.heart rate improved .  - Continue to hold metoprolol and avoid AV iveth blocking agents.14 day ziopatch ordered with cardiology follow up.       COVID-19 ruled out  - He has had two negative COVID-19 tests during this admission.     Fall  Weakness  Pt was found on the ground by staff at nursing home, per Pt there was no LOC, CT head show no hemorrhage or acute pathology; CT chest, abd/pelvis showed no acute fracture. It is possible that ongoing weakness which was one of the presenting symptoms during most recent hospitalization is due to  bradycardia noted again during this presentation.  - Treatment for bradycardia and infection as noted above.PT/OT consulted.  - Was at [facility] prior to admission, plan to return there on discharge .     Lactic acidosis  Possibly secondary to tissue hypoperfusion as a result of volume depletion in setting of diverticulitis.improved following treatment for infection .       Hyponatremia  Na 132 on presentation; likely hypovolemic hyponatremia. Resolved     Hypertension  - Blood pressures running high.  - Metoprolol discontinued due to bradycardia.  - Start lisinopril 5 mg PO daily.     Anemia  Hgb 10.8, normocytic and chronic, with baseline approx 12. No evidence of active bleed.       Recurrent seizure  - Hospitalized in April for recurrent seizures.  - Vimpat was increased during that admission, continue the increased dose.  - Continue PTA keppra.     Known chronic left ICA occlusion  CAD with ischemic CM  - Continue PTA aspirin, atorvastatin, ticagrelor.  - Metoprolol discontinued due to bradycardia as noted above.     Diabetes mellitus, type 2  - Hold PTA metformin.  - Continue PTA lantus 26 units at bedtime.  - Continue accuchecks and sliding scale NovoLog.  - Blood sugars fairly well controlled.     BPH  - Continue PTA tamsulosin.     GERD  - Continue PTA omeprazole.     Anxiety and depression  - Continue PTA duloxetine.         Patrizia Couch MD, MD    Significant Results and Procedures       Pending Results     Unresulted Labs Ordered in the Past 30 Days of this Admission     Date and Time Order Name Status Description    5/1/2020 1002 Blood culture Preliminary     5/1/2020 1002 Blood culture Preliminary           Code Status   Full Code       Primary Care Physician   Matt Morrissey    Physical Exam   Temp: 98.1  F (36.7  C) Temp src: Oral BP: 104/80 Pulse: 73 Heart Rate: 87 Resp: 18 SpO2: 97 % O2 Device: None (Room air)    Vitals:    05/02/20 0000 05/02/20 1714 05/03/20 0536   Weight: 67.6 kg (149 lb 0.5 oz)  68.6 kg (151 lb 3.2 oz) 65.8 kg (145 lb)     Vital Signs with Ranges  Temp:  [97.8  F (36.6  C)-98.3  F (36.8  C)] 98.1  F (36.7  C)  Pulse:  [73-76] 73  Heart Rate:  [87] 87  Resp:  [16-18] 18  BP: (104-153)/(54-80) 104/80  SpO2:  [95 %-97 %] 97 %  I/O last 3 completed shifts:  In: 840 [P.O.:840]  Out: 1325 [Urine:1325]    The patient was examined on the day of discharge.    Discharge Disposition   Discharged to nursing home   Condition at discharge: Stable    Consultations This Hospital Stay   CARDIOLOGY IP CONSULT  PHYSICAL THERAPY ADULT IP CONSULT  OCCUPATIONAL THERAPY ADULT IP CONSULT  CARE TRANSITION RN/SW IP CONSULT  PHYSICAL THERAPY ADULT IP CONSULT  OCCUPATIONAL THERAPY ADULT IP CONSULT    Time Spent on this Encounter   I, Patrizia Couch MD, personally saw the patient today and spent greater than 30 minutes discharging this patient.    Discharge Orders      Discharge Order: F/U with Cardiac  TEMO      HC ZIO PATCH HOLTER APPLICATION     General info for SNF    Length of Stay Estimate: Short Term Care: Estimated # of Days <30  Condition at Discharge: Improving  Level of care:skilled   Rehabilitation Potential: Excellent  Admission H&P remains valid and up-to-date: Yes  Recent Chemotherapy: N/A  Use Nursing Home Standing Orders: Yes     Mantoux instructions    Give two-step Mantoux (PPD) Per Facility Policy Yes     Reason for your hospital stay    diverticulitis     Glucose monitor nursing POCT    Before meals and at bedtime     Intake and output    Every shift     Daily weights    Call Provider for weight gain of more than 2 pounds per day or 5 pounds per week.     Follow Up and recommended labs and tests    Follow up with assisted physician.  The following labs/tests are recommended: cbc and basic metabolic panel in 4-5 days .     Activity - Up with nursing assistance     Full Code     Physical Therapy Adult Consult    Evaluate and treat as clinically indicated.    Reason: deconditioning      Occupational Therapy Adult Consult    Evaluate and treat as clinically indicated.    Reason: deconditioning     Zio Patch Holter Adult Pediatric Greater than 48 hrs     Fall precautions     Advance Diet as Tolerated    Follow this diet upon discharge: Orders Placed This Encounter      Regular Diet Adult     Discharge Medications   Current Discharge Medication List      START taking these medications    Details   amoxicillin-clavulanate (AUGMENTIN) 875-125 MG tablet Take 1 tablet by mouth 2 times daily for 10 days    Associated Diagnoses: Diverticulitis      lisinopril (ZESTRIL) 5 MG tablet Take 1 tablet (5 mg) by mouth daily  Qty:      Associated Diagnoses: Essential hypertension      metFORMIN (GLUCOPHAGE) 1000 MG tablet Take 1 tablet (1,000 mg) by mouth 2 times daily (with meals)  Qty:      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)         CONTINUE these medications which have NOT CHANGED    Details   acetaminophen (TYLENOL) 500 MG tablet Take 1,000 mg by mouth every 6 hours as needed for mild pain       aspirin EC 81 MG EC tablet Take 1 tablet (81 mg) by mouth daily  Qty: 30 tablet, Refills: 0    Associated Diagnoses: Transient cerebral ischemia, unspecified type      atorvastatin (LIPITOR) 40 MG tablet Take 40 mg by mouth daily      bisacodyl (DULCOLAX) 10 MG suppository Place 1 suppository (10 mg) rectally daily as needed for constipation  Qty: 4 suppository, Refills: 11    Associated Diagnoses: Constipation, unspecified constipation type      DULoxetine (CYMBALTA) 30 MG capsule TAKE 1 CAPSULE(30 MG) BY MOUTH DAILY  Qty: 90 capsule, Refills: 2    Associated Diagnoses: Polyneuropathy associated with underlying disease (H)      insulin glargine (LANTUS PEN) 100 UNIT/ML pen Inject 26 Units Subcutaneous At Bedtime    Comments: If Lantus is not covered by insurance, may substitute Basaglar at same dose and frequency.        ipratropium (ATROVENT) 0.03 % nasal spray INHALE 1 SPRAY IN  EACH NOSTRIL EVERY 12 HOURS AS NEEDED  Qty: 30 mL, Refills: 11    Associated Diagnoses: Runny nose      Lacosamide (VIMPAT) 100 MG TABS tablet Take 1 tablet (100 mg) by mouth 2 times daily  Qty:      Comments: Future refills by PCP Dr. Matt Morrissey with phone number [phone].  Associated Diagnoses: Seizure (H)      levETIRAcetam (KEPPRA) 750 MG tablet Take 1 tablet (750 mg) by mouth 2 times daily  Qty:      Associated Diagnoses: Seizure (H)      MAGNESIUM-OXIDE 400 (241.3 Mg) MG tablet TAKE 1 TABLET BY MOUTH TWICE DAILY  Qty: 180 tablet, Refills: 1    Associated Diagnoses: Constipation, unspecified constipation type      melatonin 1 MG TABS tablet Take 1 tablet (1 mg) by mouth nightly as needed for sleep  Qty:      Associated Diagnoses: Debility      nitroGLYcerin (NITROSTAT) 0.4 MG sublingual tablet For chest pain place 1 tablet under the tongue every 5 minutes for 3 doses. If symptoms persist 5 minutes after 1st dose call 911.  Qty: 25 tablet, Refills: 0    Associated Diagnoses: Coronary artery disease involving native heart, angina presence unspecified, unspecified vessel or lesion type      omeprazole (PRILOSEC) 40 MG DR capsule TAKE 1 CAPSULE(40 MG) BY MOUTH DAILY 30 TO 60 MINUTES BEFORE A MEAL  Qty: 90 capsule, Refills: 0    Associated Diagnoses: Other acute gastritis without hemorrhage      ondansetron (ZOFRAN) 4 MG tablet Take by mouth 3 times daily as needed for nausea       polyethylene glycol (MIRALAX) packet Take 1 packet by mouth 2 times daily as needed for constipation      polyethylene glycol-propylene glycol (SYSTANE ULTRA) 0.4-0.3 % SOLN ophthalmic solution Place 2 drops into both eyes daily as needed for dry eyes      sennosides (SENOKOT) 8.6 MG tablet Take 2 tablets by mouth daily Hold for loose stools      tamsulosin (FLOMAX) 0.4 MG capsule TAKE 1 CAPSULE BY MOUTH DAILY  Qty: 90 capsule, Refills: 3    Associated Diagnoses: Benign non-nodular prostatic hyperplasia with lower urinary tract  "symptoms      ticagrelor (BRILINTA) 90 MG tablet Take 1 tablet (90 mg) by mouth 2 times daily  Qty: 180 tablet, Refills: 1    Associated Diagnoses: TIA (transient ischemic attack)      blood glucose monitoring (NO BRAND SPECIFIED) meter device kit Use to test blood sugar bid times daily or as directed.  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 order for DME Equipment being ordered: wheeled walker with hand breaks and seat  Qty: 1 each, Refills: 0    Associated Diagnoses: Ataxia      !! order for DME Equipment being ordered: True Matrix Blood Glucose meter.  Qty: 1 Act, Refills: 11    Associated Diagnoses: Type 2 diabetes mellitus with complication, with long-term current use of insulin (H)      TRUE METRIX BLOOD GLUCOSE TEST test strip USE TO TEST THREE TIMES DAILY OR AS DIRECTED  Qty: 300 strip, Refills: 0    Associated Diagnoses: Hypoglycemia       !! - Potential duplicate medications found. Please discuss with provider.      STOP taking these medications       metoprolol succinate ER (TOPROL-XL) 25 MG 24 hr tablet Comments:   Reason for Stopping:             Allergies   Allergies   Allergen Reactions     Oxycodone Other (See Comments)     \"TERRIBLE SWEATING\"     Sulfa Drugs      Tetracycline      Data   Most Recent 3 CBC's:  Recent Labs   Lab Test 05/04/20  0542 05/03/20  0555 05/02/20  0629   WBC 5.9 7.8 6.0   HGB 10.8* 11.3* 9.7*   MCV 89 89 91    209 193      Most Recent 3 BMP's:  Recent Labs   Lab Test 05/04/20  0542 05/03/20  0555 05/02/20  0515    137 136   POTASSIUM 3.6 3.8 4.8   CHLORIDE 105 105 107   CO2 28 27 26   BUN 6* 7 14   CR 0.76 0.78 0.86   ANIONGAP 5 5 3   ALLISON 8.3* 8.5 8.2*   GLC 91 92 108*     Most Recent 2 LFT's:  Recent Labs   Lab Test 05/01/20  1005 03/06/20  0014   AST 15 26   ALT 18 19   ALKPHOS 97 73   BILITOTAL 0.9 0.6     Most Recent INR's and Anticoagulation Dosing History:  Anticoagulation Dose History     " Recent Dosing and Labs Latest Ref Rng & Units 4/8/2019 6/30/2019 7/16/2019 8/24/2019 3/5/2020 4/18/2020 5/1/2020    INR 0.86 - 1.14 1.00 1.01 0.98 0.94 1.07 1.08 1.03        Most Recent 3 Troponin's:  Recent Labs   Lab Test 05/01/20  1005 04/19/20  0720 04/19/20  0429  05/27/18  0119   TROPI 0.016 0.034 0.035   < >  --    TROPONIN  --   --   --   --  0.00    < > = values in this interval not displayed.     Most Recent Cholesterol Panel:  Recent Labs   Lab Test 03/06/20  0014   CHOL 146   LDL 69   HDL 52   TRIG 123     Most Recent 6 Bacteria Isolates From Any Culture (See EPIC Reports for Culture Details):  Recent Labs   Lab Test 05/01/20  1300 05/01/20  1020 05/01/20  1005 05/26/18  2246 05/26/18  2135 03/12/18  1454   CULT No growth No growth after 3 days No growth after 3 days No growth No growth  No growth >100,000 colonies/mL  Enterococcus faecalis  *     Most Recent TSH, T4 and A1c Labs:  Recent Labs   Lab Test 03/05/20  1159  06/03/18  0520   TSH  --   --  4.45*   T4  --   --  0.98   A1C 8.3*   < >  --     < > = values in this interval not displayed.     Results for orders placed or performed during the hospital encounter of 05/01/20   Head CT w/o contrast    Narrative    CT OF THE HEAD WITHOUT CONTRAST 5/1/2020 11:09 AM     COMPARISON: Head CT 4/20/2020    HISTORY: Fall, weakness, check for bleed.    TECHNIQUE: 5 mm thick axial CT images of the head were acquired  without IV contrast material.    FINDINGS:  There is moderate diffuse cerebral volume loss. There are  extensive confluent areas of decreased density in the cerebral white  matter bilaterally that are consistent with sequela of chronic small  vessel ischemic disease. A moderate-sized area of chronic  encephalomalacia at the anterolateral aspect of the left frontal lobe  also involving the anterior aspect of the left insula again noted  consistent with a prior chronic left MCA territory infarct.    The ventricles and basal cisterns are within normal  limits in  configuration given the degree of cerebral volume loss.  There is no  midline shift. There are no extra-axial fluid collections.     No intracranial hemorrhage, mass or recent infarct.    The visualized paranasal sinuses are well-aerated. There is no  mastoiditis. There are no fractures of the visualized bones.       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 Diffuse cerebral volume loss and cerebral white matter  changes consistent with chronic small vessel ischemic disease. No  evidence for acute intracranial pathology.       Radiation dose for this scan was reduced using automated exposure  control, adjustment of the mA and/or kV according to patient size, or  iterative reconstruction technique.    LISANDRO MAGALLON MD   CT Chest/Abdomen/Pelvis w Contrast    Narrative    CT CHEST/ABDOMEN/PELVIS WITH CONTRAST  5/1/2020 11:10 AM    CLINICAL HISTORY: Abdomen pain, vomiting, weakness, fall today, cough.  Check for appendicitis, small bowel obstruction, diverticulitis,  pancreatitis, traumatic injury, pneumonia.    TECHNIQUE: CT scan of the chest, abdomen, and pelvis was performed  following injection of IV contrast. Multiplanar reformats were  obtained. Dose reduction techniques were used.   CONTRAST: 74 mL [id number]    COMPARISON: 5/27/2018    FINDINGS:   LUNGS AND PLEURA: Mild centrilobular emphysema. Stable band of  scarring or atelectasis in the inferior lingula. Lungs are otherwise  clear. No pleural effusion or pneumothorax.    MEDIASTINUM/AXILLAE: No lymphadenopathy. Severe coronary artery  calcification.    HEPATOBILIARY: Cholecystectomy.    PANCREAS: Normal.    SPLEEN: Normal.    ADRENAL GLANDS: Normal.    KIDNEYS/BLADDER: Stable left renal cyst with partially calcified  septation. Stable nonobstructing renal calculi, the largest at the  lower pole of the left kidney measuring 8  mm in diameter. No  hydronephrosis.    BOWEL: Diverticulosis of the descending and sigmoid colon. Minimal  inflammatory change around one of the diverticula in the proximal  sigmoid colon. No abscess or perforation. Normal appendix. No  obstruction.    PELVIC ORGANS: Normal.    ADDITIONAL FINDINGS: Diffuse atherosclerosis. 3 cm distal abdominal  aortic aneurysm.    MUSCULOSKELETAL: Chronic spondylolysis with anterolisthesis at L5-S1.  No acute fractures.      Impression    IMPRESSION:  1.  Minimal inflammation around a sigmoid colonic diverticula,  consistent with minimal diverticulitis.  2.  No evidence of a traumatic injury in the chest, abdomen, or  pelvis. No other acute findings.    LOUISE POSADAS MD

## 2020-05-04 NOTE — PLAN OF CARE
VSS. Monitor shows Sinus rhythm with BBB. Pt. Denies pain. Plan for discharge today to [place]. Significant other Halina aware of plan.

## 2020-05-05 NOTE — LETTER
"    5/5/2020        RE: Dustin Pearce  [address]  [facility] [address]         [facility]  Dustin Pearce is being evaluated via a billable video visit due to the restrictions of the Covid-19 pandemic.   The patient has been notified of following:  \"This video visit will be conducted via a call between you and your provider. We have found that certain health care needs can be provided without the need for an in-person physical exam.  This service lets us provide the care you need with a video conversation. If during the course of the call the provider feels a video visit is not appropriate, you will not be charged for this service.\"   The provider has received verbal consent for a Video Visit from the patient and or first contact? Yes  Patient/facility staff would like the video invitation sent by: N/A   Video Start Time: 12:54 PM  Which Facility the Patient is at during the time of visit: [facility]     PRIMARY CARE PROVIDER AND CLINIC:  Matt Morrissey MD, [address] [place] [address] / [place] [address]  Chief Complaint   Patient presents with     Hospital F/U     [facility] Medical Record Number:  [id number]  Dustin Pearce  is a 89 year old  (1/31/1928), admitted to the above facility from  [facility]. Hospital stay 5/1/2020 through 5/4/2020..  Admitted to this facility for  rehab, medical management and nursing care.  HPI:    HPI information obtained from: facility chart records, facility staff, patient report and [facility] chart review.   Brief Summary of Hospital Course: Recent hospitalization due to fall at TCU. PMH includes paroxysmal atrial fibrillation,CAD-s/p MODESTA, PAD, T2DM, hypertension, SEIZURE disorder. During hospitalization he was treated for diverticulitis. Cardiology was consulted due to bradycardia. Once stable he was transferred back to TCU for strengthening, nursing care and medical monitoring.    Updates on Status Since Skilled nursing " Admission: patient denies pain;he denies shortness of breath. Nursing reports he fell this morning- no apparent injury.     CODE STATUS/ADVANCE DIRECTIVES DISCUSSION:   CPR/Full code   Patient's living condition: lives in an assisted living facility  ALLERGIES: Oxycodone; Sulfa drugs; and Tetracycline  PAST MEDICAL HISTORY:  has a past medical history of Abdominal aortic aneurysm (H) (12/25/2016), Acute on chronic systolic congestive heart failure (H) (6/7/2018), Anemia (6/7/2018), Anemia due to blood loss, acute (6/13/2017), Aortic stenosis, Benign essential hypertension (1/6/2017), Benign non-nodular prostatic hyperplasia with lower urinary tract symptoms (10/20/2016), CAD (coronary artery disease), Carotid artery stenosis (10/20/2016), Cerebrovascular accident (H) (10/20/2016), Cognitive impairment (6/7/2018), Coronary artery disease of native artery of native heart with stable angina pectoris (H) (10/20/2016), Depression, unspecified depression type (2/22/2018), Diabetes mellitus (H), Diabetic ulcer of left foot associated with diabetes mellitus due to underlying condition (H) (6/12/2017), 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) (6/12/2017), Gastro-oesophageal reflux disease, Gastroesophageal reflux disease without esophagitis (10/20/2016), Heart attack (H), Hyperlipidemia, Hyperlipidemia, unspecified hyperlipidemia type (10/20/2016), Ischemic cardiomyopathy, Lumbar back pain (12/30/2016), Mild cognitive impairment (10/20/2016), Nephrolithiasis, NSTEMI (non-ST elevated myocardial infarction) (H) (6/7/2018), Osteopenia, PAD (peripheral artery disease) (H), Paroxysmal atrial fibrillation (H), Peripheral artery disease (H), RBBB with left anterior fascicular block, Slow transit constipation (2/22/2018), Stroke of unknown etiology (H) (12/31/2017), Syncope, TIA (transient ischemic attack) (1/20/2017), Unspecified cerebral artery occlusion with cerebral infarction, UTI (urinary tract infection) due to  Enterococcus (2/22/2018), and Ventricular tachycardia (H). He also has no past medical history of Difficult intubation or Malignant hyperthermia.  PAST SURGICAL HISTORY:   has a past surgical history that includes Cholecystectomy; hernia repair; Abdomen surgery; Laser holmium lithotripsy ureter(s), insert stent, combined (3/2/2012); rotator cuff repair rt/lt; Esophagoscopy, gastroscopy, duodenoscopy (EGD), combined (N/A, 12/26/2016); UGI ENDOSCOPY W EUS (N/A, 12/29/2016); Amputate foot (Left, 6/4/2017); Incision and drainage foot, combined (Left, 6/6/2017); and Endarterectomy carotid (Right, 1/3/2018).  FAMILY HISTORY: family history includes Breast Cancer in his mother; Heart Failure (age of onset: 81) in his father.  SOCIAL HISTORY:   reports that he quit smoking about 21 years ago. His smoking use included cigarettes. He started smoking about 51 years ago. He has a 30.00 pack-year smoking history. He has never used smokeless tobacco. He reports previous alcohol use of about 7.0 standard drinks of alcohol per week. He reports that he does not use drugs.  Current Outpatient Medications   Medication Sig Dispense Refill     acetaminophen (TYLENOL) 500 MG tablet Take 1,000 mg by mouth every 6 hours as needed for mild pain        amoxicillin-clavulanate (AUGMENTIN) 875-125 MG tablet Take 1 tablet by mouth 2 times daily for 10 days       aspirin EC 81 MG EC tablet Take 1 tablet (81 mg) by mouth daily 30 tablet 0     atorvastatin (LIPITOR) 40 MG tablet Take 40 mg by mouth daily       bisacodyl (DULCOLAX) 10 MG suppository Place 1 suppository (10 mg) rectally daily as needed for constipation 4 suppository 11     blood glucose monitoring (NO BRAND SPECIFIED) meter device kit Use to test blood sugar bid times daily or as directed. 1 kit 0     DULoxetine (CYMBALTA) 30 MG capsule TAKE 1 CAPSULE(30 MG) BY MOUTH DAILY 90 capsule 2     insulin glargine (LANTUS PEN) 100 UNIT/ML pen Inject 26 Units Subcutaneous At Bedtime        ipratropium (ATROVENT) 0.03 % nasal spray INHALE 1 SPRAY IN EACH NOSTRIL EVERY 12 HOURS AS NEEDED 30 mL 11     Lacosamide (VIMPAT) 100 MG TABS tablet Take 1 tablet (100 mg) by mouth 2 times daily       levETIRAcetam (KEPPRA) 750 MG tablet Take 1 tablet (750 mg) by mouth 2 times daily       lisinopril (ZESTRIL) 5 MG tablet Take 1 tablet (5 mg) by mouth daily       MAGNESIUM-OXIDE 400 (241.3 Mg) MG tablet TAKE 1 TABLET BY MOUTH TWICE DAILY 180 tablet 1     melatonin 1 MG TABS tablet Take 1 tablet (1 mg) by mouth nightly as needed for sleep       metFORMIN (GLUCOPHAGE) 1000 MG tablet Take 1 tablet (1,000 mg) by mouth 2 times daily (with meals)       nitroGLYcerin (NITROSTAT) 0.4 MG sublingual tablet For chest pain place 1 tablet under the tongue every 5 minutes for 3 doses. If symptoms persist 5 minutes after 1st dose call 911. 25 tablet 0     omeprazole (PRILOSEC) 40 MG DR capsule TAKE 1 CAPSULE(40 MG) BY MOUTH DAILY 30 TO 60 MINUTES BEFORE A MEAL 90 capsule 0     ondansetron (ZOFRAN) 4 MG tablet Take by mouth 3 times daily as needed for nausea        order for DME Equipment being ordered: wheeled walker with hand breaks and seat 1 each 0     order for DME Equipment being ordered: True Matrix Blood Glucose meter. 1 Act 11     polyethylene glycol (MIRALAX) packet Take 1 packet by mouth 2 times daily as needed for constipation       polyethylene glycol-propylene glycol (SYSTANE ULTRA) 0.4-0.3 % SOLN ophthalmic solution Place 2 drops into both eyes daily as needed for dry eyes       sennosides (SENOKOT) 8.6 MG tablet Take 2 tablets by mouth daily Hold for loose stools       tamsulosin (FLOMAX) 0.4 MG capsule TAKE 1 CAPSULE BY MOUTH DAILY 90 capsule 3     ticagrelor (BRILINTA) 90 MG tablet Take 1 tablet (90 mg) by mouth 2 times daily 180 tablet 1     TRUE METRIX BLOOD GLUCOSE TEST test strip USE TO TEST THREE TIMES DAILY OR AS DIRECTED 300 strip 0        ROS: 4 point ROS including Respiratory, CV, GI and , other than  "that noted in the HPI,  is negative  Vitals:/56   Pulse 56   Temp 97  F (36.1  C)   Resp 16   Ht 1.676 m (5' 6\")   Wt 67.9 kg (149 lb 9.6 oz)   SpO2 95%   BMI 24.15 kg/m     Limited Visit Exam done given COVID-19 precautions:  GENERAL APPEARANCE:  Alert, in no distress  ENT:  hearing acuity very [place]    RESP:  respiratory effort  normal, no respiratory distress,    M/S:  Lying in bed  SKIN:  Inspection/Palpation of skin and subcutaneous tissue no rash  NEURO: 2-12 in normal limits and at patient's baseline  PSYCH:  insight and judgement, memory impaired , affect and mood normal    Lab/Diagnostic data:  CBC RESULTS:   Recent Labs   Lab Test 05/04/20  0542 05/03/20  0555   WBC 5.9 7.8   RBC 3.90* 4.12*   HGB 10.8* 11.3*   HCT 34.8* 36.8*   MCV 89 89   MCH 27.7 27.4   MCHC 31.0* 30.7*   RDW 15.5* 15.7*    209       Last Basic Metabolic Panel:  Recent Labs   Lab Test 05/04/20  0542 05/03/20  0555    137   POTASSIUM 3.6 3.8   CHLORIDE 105 105   ALLISON 8.3* 8.5   CO2 28 27   BUN 6* 7   CR 0.76 0.78   GLC 91 92       Liver Function Studies -   Recent Labs   Lab Test 05/01/20  1005 03/06/20  0014   PROTTOTAL 6.5* 7.0   ALBUMIN 3.3* 3.5   BILITOTAL 0.9 0.6   ALKPHOS 97 73   AST 15 26   ALT 18 19       TSH   Date Value Ref Range Status   06/03/2018 4.45 (H) 0.40 - 4.00 mU/L Final   12/31/2017 2.16 0.40 - 4.00 mU/L Final   ]    Lab Results   Component Value Date    A1C 8.3 03/05/2020    A1C 7.6 09/23/2019           ASSESSMENT/PLAN:  Diverticulitis  Patient in TCU following hospitalization due to fall. Work up revealed diverticulitis. He was treated with antibiotics-zosyn. Diet was advanced.   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 He also has recent h/o \"spells\".  History includes 2 MODESTA. S/p right carotid endarterectomy, s/p left common iliac stenting, left SFA angioplasty.  NSTEMI 2017. Echo on 4/18 showed EF 30-35%. During hospital stay metoprolol was stopped due to HR 40s. Patient denies LE pain. Ziopatch was placed prior to leaving hospital.  -continue ASA 81mg q day  -continue ticagrelor 90mg BID  -continue atorvastatin 40mg q day  -follow up with cardiology in 2 weeks    Anemia, unspecified type  hgb 10.8. baseline hgb 12.   -hgb next week    Type 2 diabetes mellitus with complication, with long-term current use of insulin (H)  No change to PTA lantus or metformin. FBG in ,103  -continue FBG BID at alternating times  -continue metformin 1000mg BID  -continue lantus 26u q hs    Seizure disorder (H)  Recent hospitalization due to seizure (4/18-4/21). vimpat was increased at that time. No seizure activity noted since readmit to TCU.   -continue lacosamide 100mg BID  -continue levetiracetam 750mg BID  -follow up with neurology    Slow transit constipation  Continues on PTA miralax 17gm BID prn and senna s ii po q day  No report of constipation today  -continue with current plan.     Depression, unspecified depression type  7 hospitalizations in past year. He lives in [facility] with wife.   Flat affect today except when talking about chocolate  -continue duloxetine 30mg q day    Physical deconditioning  As above 7 hospitalizations in past year. He is becoming more frail by this measure. He did fall this morning. He lives w significant other in [facility] in [place].   Advanced macular degeneration so has driven in years.   Halina, SO, is concerned that his peanut butter sandwich was ground up.   -physical therapy and OCCUPATIONAL THERAPY   SPEECH THERAPY     POL discussed with Halina- " full code.     Electronically signed by:  ASPEN Almaraz CNP     Video-Visit Details  Type of service:  Video Visit  Video End Time (time video stopped): 1:01pm  Distant Location (provider location):  [facility]                 Sincerely,        ASPEN Almaraz CNP

## 2020-05-05 NOTE — PROGRESS NOTES
" [facility]  Dustin Pearce is being evaluated via a billable video visit due to the restrictions of the Covid-19 pandemic.   The patient has been notified of following:  \"This video visit will be conducted via a call between you and your provider. We have found that certain health care needs can be provided without the need for an in-person physical exam.  This service lets us provide the care you need with a video conversation. If during the course of the call the provider feels a video visit is not appropriate, you will not be charged for this service.\"   The provider has received verbal consent for a Video Visit from the patient and or first contact? Yes  Patient/facility staff would like the video invitation sent by: N/A   Video Start Time: 12:54 PM  Which Facility the Patient is at during the time of visit: [facility]     PRIMARY CARE PROVIDER AND CLINIC:  Mtat Morrissey MD, [address] / [place] [address]  Chief Complaint   Patient presents with     Hospital F/U     [facility] Medical Record Number:  [id number]  Dustin Pearce  is a 89 year old  (1/31/1928), admitted to the above facility from  [facility]. Hospital stay 5/1/2020 through 5/4/2020..  Admitted to this facility for  rehab, medical management and nursing care.  HPI:    HPI information obtained from: facility chart records, facility staff, patient report and [facility] chart review.   Brief Summary of Hospital Course: Recent hospitalization due to fall at TCU. PMH includes paroxysmal atrial fibrillation,CAD-s/p MODESTA, PAD, T2DM, hypertension, SEIZURE disorder. During hospitalization he was treated for diverticulitis. Cardiology was consulted due to bradycardia. Once stable he was transferred back to TCU for strengthening, nursing care and medical monitoring.    Updates on Status Since Skilled nursing Admission: patient denies pain;he denies shortness of breath. Nursing reports he fell this " morning- no apparent injury.     CODE STATUS/ADVANCE DIRECTIVES DISCUSSION:   CPR/Full code   Patient's living condition: lives in an assisted living facility  ALLERGIES: Oxycodone; Sulfa drugs; and Tetracycline  PAST MEDICAL HISTORY:  has a past medical history of Abdominal aortic aneurysm (H) (12/25/2016), Acute on chronic systolic congestive heart failure (H) (6/7/2018), Anemia (6/7/2018), Anemia due to blood loss, acute (6/13/2017), Aortic stenosis, Benign essential hypertension (1/6/2017), Benign non-nodular prostatic hyperplasia with lower urinary tract symptoms (10/20/2016), CAD (coronary artery disease), Carotid artery stenosis (10/20/2016), Cerebrovascular accident (H) (10/20/2016), Cognitive impairment (6/7/2018), Coronary artery disease of native artery of native heart with stable angina pectoris (H) (10/20/2016), Depression, unspecified depression type (2/22/2018), Diabetes mellitus (H), Diabetic ulcer of left foot associated with diabetes mellitus due to underlying condition (H) (6/12/2017), 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) (6/12/2017), Gastro-oesophageal reflux disease, Gastroesophageal reflux disease without esophagitis (10/20/2016), Heart attack (H), Hyperlipidemia, Hyperlipidemia, unspecified hyperlipidemia type (10/20/2016), Ischemic cardiomyopathy, Lumbar back pain (12/30/2016), Mild cognitive impairment (10/20/2016), Nephrolithiasis, NSTEMI (non-ST elevated myocardial infarction) (H) (6/7/2018), Osteopenia, PAD (peripheral artery disease) (H), Paroxysmal atrial fibrillation (H), Peripheral artery disease (H), RBBB with left anterior fascicular block, Slow transit constipation (2/22/2018), Stroke of unknown etiology (H) (12/31/2017), Syncope, TIA (transient ischemic attack) (1/20/2017), Unspecified cerebral artery occlusion with cerebral infarction, UTI (urinary tract infection) due to Enterococcus (2/22/2018), and Ventricular tachycardia (H). He also has no past medical history  of Difficult intubation or Malignant hyperthermia.  PAST SURGICAL HISTORY:   has a past surgical history that includes Cholecystectomy; hernia repair; Abdomen surgery; Laser holmium lithotripsy ureter(s), insert stent, combined (3/2/2012); rotator cuff repair rt/lt; Esophagoscopy, gastroscopy, duodenoscopy (EGD), combined (N/A, 12/26/2016); UGI ENDOSCOPY W EUS (N/A, 12/29/2016); Amputate foot (Left, 6/4/2017); Incision and drainage foot, combined (Left, 6/6/2017); and Endarterectomy carotid (Right, 1/3/2018).  FAMILY HISTORY: family history includes Breast Cancer in his mother; Heart Failure (age of onset: 81) in his father.  SOCIAL HISTORY:   reports that he quit smoking about 21 years ago. His smoking use included cigarettes. He started smoking about 51 years ago. He has a 30.00 pack-year smoking history. He has never used smokeless tobacco. He reports previous alcohol use of about 7.0 standard drinks of alcohol per week. He reports that he does not use drugs.  Current Outpatient Medications   Medication Sig Dispense Refill     acetaminophen (TYLENOL) 500 MG tablet Take 1,000 mg by mouth every 6 hours as needed for mild pain        amoxicillin-clavulanate (AUGMENTIN) 875-125 MG tablet Take 1 tablet by mouth 2 times daily for 10 days       aspirin EC 81 MG EC tablet Take 1 tablet (81 mg) by mouth daily 30 tablet 0     atorvastatin (LIPITOR) 40 MG tablet Take 40 mg by mouth daily       bisacodyl (DULCOLAX) 10 MG suppository Place 1 suppository (10 mg) rectally daily as needed for constipation 4 suppository 11     blood glucose monitoring (NO BRAND SPECIFIED) meter device kit Use to test blood sugar bid times daily or as directed. 1 kit 0     DULoxetine (CYMBALTA) 30 MG capsule TAKE 1 CAPSULE(30 MG) BY MOUTH DAILY 90 capsule 2     insulin glargine (LANTUS PEN) 100 UNIT/ML pen Inject 26 Units Subcutaneous At Bedtime       ipratropium (ATROVENT) 0.03 % nasal spray INHALE 1 SPRAY IN EACH NOSTRIL EVERY 12 HOURS AS NEEDED  30 mL 11     Lacosamide (VIMPAT) 100 MG TABS tablet Take 1 tablet (100 mg) by mouth 2 times daily       levETIRAcetam (KEPPRA) 750 MG tablet Take 1 tablet (750 mg) by mouth 2 times daily       lisinopril (ZESTRIL) 5 MG tablet Take 1 tablet (5 mg) by mouth daily       MAGNESIUM-OXIDE 400 (241.3 Mg) MG tablet TAKE 1 TABLET BY MOUTH TWICE DAILY 180 tablet 1     melatonin 1 MG TABS tablet Take 1 tablet (1 mg) by mouth nightly as needed for sleep       metFORMIN (GLUCOPHAGE) 1000 MG tablet Take 1 tablet (1,000 mg) by mouth 2 times daily (with meals)       nitroGLYcerin (NITROSTAT) 0.4 MG sublingual tablet For chest pain place 1 tablet under the tongue every 5 minutes for 3 doses. If symptoms persist 5 minutes after 1st dose call 911. 25 tablet 0     omeprazole (PRILOSEC) 40 MG DR capsule TAKE 1 CAPSULE(40 MG) BY MOUTH DAILY 30 TO 60 MINUTES BEFORE A MEAL 90 capsule 0     ondansetron (ZOFRAN) 4 MG tablet Take by mouth 3 times daily as needed for nausea        order for DME Equipment being ordered: wheeled walker with hand breaks and seat 1 each 0     order for DME Equipment being ordered: True Matrix Blood Glucose meter. 1 Act 11     polyethylene glycol (MIRALAX) packet Take 1 packet by mouth 2 times daily as needed for constipation       polyethylene glycol-propylene glycol (SYSTANE ULTRA) 0.4-0.3 % SOLN ophthalmic solution Place 2 drops into both eyes daily as needed for dry eyes       sennosides (SENOKOT) 8.6 MG tablet Take 2 tablets by mouth daily Hold for loose stools       tamsulosin (FLOMAX) 0.4 MG capsule TAKE 1 CAPSULE BY MOUTH DAILY 90 capsule 3     ticagrelor (BRILINTA) 90 MG tablet Take 1 tablet (90 mg) by mouth 2 times daily 180 tablet 1     TRUE METRIX BLOOD GLUCOSE TEST test strip USE TO TEST THREE TIMES DAILY OR AS DIRECTED 300 strip 0        ROS: 4 point ROS including Respiratory, CV, GI and , other than that noted in the HPI,  is negative  Vitals:/56   Pulse 56   Temp 97  F (36.1  C)   Resp 16  "  Ht 1.676 m (5' 6\")   Wt 67.9 kg (149 lb 9.6 oz)   SpO2 95%   BMI 24.15 kg/m     Limited Visit Exam done given COVID-19 precautions:  GENERAL APPEARANCE:  Alert, in no distress  ENT:  hearing acuity very [place]    RESP:  respiratory effort  normal, no respiratory distress,    M/S:  Lying in bed  SKIN:  Inspection/Palpation of skin and subcutaneous tissue no rash  NEURO: 2-12 in normal limits and at patient's baseline  PSYCH:  insight and judgement, memory impaired , affect and mood normal    Lab/Diagnostic data:  CBC RESULTS:   Recent Labs   Lab Test 05/04/20  0542 05/03/20  0555   WBC 5.9 7.8   RBC 3.90* 4.12*   HGB 10.8* 11.3*   HCT 34.8* 36.8*   MCV 89 89   MCH 27.7 27.4   MCHC 31.0* 30.7*   RDW 15.5* 15.7*    209       Last Basic Metabolic Panel:  Recent Labs   Lab Test 05/04/20  0542 05/03/20  0555    137   POTASSIUM 3.6 3.8   CHLORIDE 105 105   ALLISON 8.3* 8.5   CO2 28 27   BUN 6* 7   CR 0.76 0.78   GLC 91 92       Liver Function Studies -   Recent Labs   Lab Test 05/01/20  1005 03/06/20  0014   PROTTOTAL 6.5* 7.0   ALBUMIN 3.3* 3.5   BILITOTAL 0.9 0.6   ALKPHOS 97 73   AST 15 26   ALT 18 19       TSH   Date Value Ref Range Status   06/03/2018 4.45 (H) 0.40 - 4.00 mU/L Final   12/31/2017 2.16 0.40 - 4.00 mU/L Final   ]    Lab Results   Component Value Date    A1C 8.3 03/05/2020    A1C 7.6 09/23/2019           ASSESSMENT/PLAN:  Diverticulitis  Patient in TCU following hospitalization due to fall. Work up revealed diverticulitis. He was treated with antibiotics-zosyn. Diet was advanced.   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 He also has recent h/o \"spells\".  History includes 2 MODESTA. S/p right carotid endarterectomy, s/p left common iliac stenting, left SFA angioplasty.  NSTEMI 2017. Echo on 4/18 showed EF 30-35%. During hospital stay metoprolol was stopped due to HR 40s. Patient denies LE pain. Ziopatch was placed prior to leaving hospital.  -continue ASA 81mg q day  -continue ticagrelor 90mg BID  -continue atorvastatin 40mg q day  -follow up with cardiology in 2 weeks    Anemia, unspecified type  hgb 10.8. baseline hgb 12.   -hgb next week    Type 2 diabetes mellitus with complication, with long-term current use of insulin (H)  No change to PTA lantus or metformin. FBG in ,103  -continue FBG BID at alternating times  -continue metformin 1000mg BID  -continue lantus 26u q hs    Seizure disorder (H)  Recent hospitalization due to seizure (4/18-4/21). vimpat was increased at that time. No seizure activity noted since readmit to TCU.   -continue lacosamide 100mg BID  -continue levetiracetam 750mg BID  -follow up with neurology    Slow transit constipation  Continues on PTA miralax 17gm BID prn and senna s ii po q day  No report of constipation today  -continue with current plan.     Depression, unspecified depression type  7 hospitalizations in past year. He lives in [facility] with wife.   Flat affect today except when talking about chocolate  -continue duloxetine 30mg q day    Physical deconditioning  As above 7 hospitalizations in past year. He is becoming more frail by this measure. He did fall this morning. He lives w significant other in [facility] in [place].   Advanced macular degeneration so has driven in years.   Halina, SO, is concerned that his peanut butter sandwich was ground up.   -physical therapy and OCCUPATIONAL THERAPY   SPEECH THERAPY     POLST discussed with Halina- full code.     Electronically signed by:  ASPEN Almaraz CNP     Video-Visit " Details  Type of service:  Video Visit  Video End Time (time video stopped): 1:01pm  Distant Location (provider location):  [facility]

## 2020-05-05 NOTE — LETTER
To:             Please give to facility    From:   Farnaz Eddy RN, Care Coordinator   [facility] -Care Coordination  [facility]   E-mail [email or web address]   [phone]    Patient Name:  Dustin Pearce YOB: 1928   Admit date: 5/4/2020      *Information Needed:  Please contact me when the patient will discharge (or if they will move to long term care)- include the discharge date, disposition, and main diagnosis   - If the patient is discharged with home care services, please provide the name of the agency    Also- Please inform me if a care conference is being held.   Farnaz Eddy RN, Care Coordinator   [facility] -Care Coordination  [facility]   E-mail [email or web address]   [phone]                              Thank you

## 2020-05-05 NOTE — PROGRESS NOTES
Clinic Care Coordination Contact  Care Coordination Transition Communication      Clinical Data:  [facility]     Discharge Summary  Hospitalist     Date of Admission:  5/1/2020  Date of Discharge:  5/4/2020  Discharging Provider: Patrizia Couch MD        Discharge Diagnoses      diverticulitis      Transition to Facility:              [facility]              Contact name and phone number/fax: CC faxed contact information to the facility to call when discharged from TCU?    Plan: RN/SW Care Coordinator will await notification from facility staff informing RN/SW Care Coordinator of patient's discharge plans/needs. RN/SW Care Coordinator will review chart and outreach to facility staff every 4 weeks and as needed.   [facility]     Farnaz Eddy  RN Care Coordinator   [facility] / [facility]   Phone: [phone]  Email :  [email or web address]

## 2020-05-06 NOTE — TELEPHONE ENCOUNTER
Patient was evaluated by cardiology while inpatient for fall at nursing home, diverticulitis and bradycardia-rates 30-40's bpm. Metoprolol held at time of discharge. RN attempted to call [facility] to confirm the follow up plan for patient, but no answer. VM left reminding them that pt is scheduled for a F/U phone appt on 5/12/20 at 1230 with [facility] [facility] and to call our scheduling dept for any further questions or details regarding this appt. Scheduling phone number was provided. Pt was discharged wearing a 2 week Zio Patch monitor that was placed on 5/4/20 for bradycardia. MARSHALL Estrada RN.

## 2020-05-08 NOTE — TELEPHONE ENCOUNTER
[facility] TELEPHONE ENCOUNTER    No chief complaint on file.      Dustin Pearce is a 89 year old  (1/31/1928),Nurse called today to report: hypotension. BPs 92/84, 95/58, 81/44. 108/63    ASSESSMENT/PLAN  hypotension    DISCONTINUE lisinopril   Electronically signed by:   ASPEN Almaraz CNP

## 2020-05-12 NOTE — LETTER
5/12/2020        RE: Dustin Pearce  [address]  [place] [address]        [facility] DISCHARGE SUMMARY  PATIENT'S NAME: Dustin Pearce  YOB: 1928  MEDICAL RECORD NUMBER:  [id number]  Place of Service where encounter took place:  [facility] [[id number]]    PRIMARY CARE PROVIDER AND CLINIC RESPONSIBLE AFTER TRANSFER:   Matt Morrissey MD, [address] / [place] [address]    G Provider     Transferring providers: ASPEN Shaikh CNP, Fe Kim MD  Recent Hospitalization/ED:  [facility] Hospital stay 5/1/20 to 5/4/20.  Date of SNF Admission: May / 04 / 2020  Date of SNF (anticipated) Discharge: May / 15 / 2020  Discharged to: previous independent home with significant other   Cognitive Scores: SLUMS: 11/30  DME: Walker    CODE STATUS/ADVANCE DIRECTIVES DISCUSSION:  Full Code   ALLERGIES: Oxycodone; Sulfa drugs; and Tetracycline    DISCHARGE DIAGNOSIS/NURSING FACILITY COURSE:   He initially came to this facility 4/21/2020 following hospitalization 4/18/2020 for weakness and word finding difficulty, felt to be due to seizure activity. Imaging was negative for stroke. Vimpat was increased per Neurology and Keppra was continued. ECHO showed EF 30-35% with mid to basal inferolateral akinesis-unchanged from 3/2020.   He was sent to the ED 5/1/2020 with marked change in mental status after an unwitnessed fall. CT head showed no hemorrhage or acute pathology. CT chest/abdomen/pelvis showed minimal inflammation around a sigmoid colonic diverticula, consistent with minimal diverticulitis. He was treated with Zosyn and discharged on Augmentin. EKG showed sinus bradycardia. Cardiology consulted and metoprolol was discontinued. Ziopatch was placed prior to hospital discharge.     He has worked with PHYSICAL THERAPY/OT and SPEECH THERAPY with good progress. Ambulating 500 ft with walker and stand by to contact  guard assist. Able to do 9 stairs with hand rails and assist. TUG 35 seconds, indicating high fall risk. Requires supervision to min assist with cares.   ASSESSMENT / PLAN:  (K57.92) Diverticulitis  (primary encounter diagnosis)  Comment: infection appears resolved. Afebrile and no GI symptoms. He has reached max potential in therapies and is stable for discharge.   Plan: complete course of Augmentin 5/14/2020. Discharge home with significant other Halina Dhillon-attempted to reach her today without success and will try again.  Home care services and follow up as below.     (G40.909) Seizure disorder (H)  Comment: no seizure activity has been observed during his tcu stay.   Plan: continue Vimpat and Keppra. Neurology follow up per usual routine.     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 HR has ranged: 73-93. Ziopatch remains in place. Metoprolol remains on hold. Lisinopril was discontinued due to hypotension.   Plan: Cardiology follow up later today as scheduled. Continue ASA, statin.     (I73.9) PAD (peripheral artery disease) (H)  Comment: no acute issues   Plan: continue current medication regimen     (E11.8,  Z79.4) Type 2 diabetes mellitus with complication, with long-term current use of insulin (H)  Comment: controlled with blood sugars: , outlier 257  Plan: continue current dose of glargine and metformin. Monitor blood sugars per usual home routine.     (D64.9) Anemia, unspecified type  Comment: chronic, no s/s of active bleeding. Hgb stable at 10.8 at hospital discharge   Plan: follow up labs per PCP     (I10) Benign essential hypertension  Comment: lisinopril was started  during his second hospitalization and subsequently discontinued while on tcu due to hypotension. Recent BPs improved: 132/82, 95/58, 123/62, 136/70    Plan: follow up with PCP. Home care nurse referral for VS monitoring. Avoid hypotension due to advanced age and fall risk.     (F32.9) Depression, unspecified depression type  Comment: appears well managed   Plan: continue duloxetine.     (R41.89) Cognitive impairment  History of CVA   Comment: moderate to severe deficits on cognitive testing, which appears to be his baseline. Tolerating DD3 with thin liquids.   Plan: home care referral. His SO assists with all cares and med management. Continue ASA, ticagrelor, statin for history of CVA.     Frequent falls  Comment: he's had falls while on tcu, most recent was a few days ago. Reports some mild back pain after the fall. No weakness or signs of an acute injury.   Plan: tylenol bid and daily prn. Fall precautions .    (R53.81) Physical deconditioning  Comment: progress in therapies as above   Plan: home therapies for ongoing gait training, strengthening and ADL safety         Past Medical History:  has a past medical history of Abdominal aortic aneurysm (H) (12/25/2016), Acute on chronic systolic congestive heart failure (H) (6/7/2018), Anemia (6/7/2018), Anemia due to blood loss, acute (6/13/2017), Aortic stenosis, Benign essential hypertension (1/6/2017), Benign non-nodular prostatic hyperplasia with lower urinary tract symptoms (10/20/2016), CAD (coronary artery disease), Carotid artery stenosis (10/20/2016), Cerebrovascular accident (H) (10/20/2016), Cognitive impairment (6/7/2018), Coronary artery disease of native artery of native heart with stable angina pectoris (H) (10/20/2016), Depression, unspecified depression type (2/22/2018), Diabetes mellitus (H), Diabetic ulcer of left foot associated with diabetes mellitus due to underlying condition (H) (6/12/2017), 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)  (6/12/2017), Gastro-oesophageal reflux disease, Gastroesophageal reflux disease without esophagitis (10/20/2016), Heart attack (H), Hyperlipidemia, Hyperlipidemia, unspecified hyperlipidemia type (10/20/2016), Ischemic cardiomyopathy, Lumbar back pain (12/30/2016), Mild cognitive impairment (10/20/2016), Nephrolithiasis, NSTEMI (non-ST elevated myocardial infarction) (H) (6/7/2018), Osteopenia, PAD (peripheral artery disease) (H), Paroxysmal atrial fibrillation (H), Peripheral artery disease (H), RBBB with left anterior fascicular block, Slow transit constipation (2/22/2018), Stroke of unknown etiology (H) (12/31/2017), Syncope, TIA (transient ischemic attack) (1/20/2017), Unspecified cerebral artery occlusion with cerebral infarction, UTI (urinary tract infection) due to Enterococcus (2/22/2018), and Ventricular tachycardia (H). He also has no past medical history of Difficult intubation or Malignant hyperthermia.    Discharge Medications:    Current Outpatient Medications   Medication Sig Dispense Refill     acetaminophen (TYLENOL) 500 MG tablet Take 1,000 mg by mouth 2 times daily Plus 1000 mg daily as needed for pain       aspirin EC 81 MG EC tablet Take 1 tablet (81 mg) by mouth daily 30 tablet 0     atorvastatin (LIPITOR) 40 MG tablet Take 40 mg by mouth daily       blood glucose monitoring (NO BRAND SPECIFIED) meter device kit Use to test blood sugar bid times daily or as directed. 1 kit 0     DULoxetine (CYMBALTA) 30 MG capsule TAKE 1 CAPSULE(30 MG) BY MOUTH DAILY 90 capsule 2     insulin glargine (LANTUS PEN) 100 UNIT/ML pen Inject 26 Units Subcutaneous At Bedtime       ipratropium (ATROVENT) 0.03 % nasal spray INHALE 1 SPRAY IN EACH NOSTRIL EVERY 12 HOURS AS NEEDED 30 mL 11     Lacosamide (VIMPAT) 100 MG TABS tablet Take 1 tablet (100 mg) by mouth 2 times daily       levETIRAcetam (KEPPRA) 750 MG tablet Take 1 tablet (750 mg) by mouth 2 times daily       MAGNESIUM-OXIDE 400 (241.3 Mg) MG tablet TAKE 1  "TABLET BY MOUTH TWICE DAILY 180 tablet 1     metFORMIN (GLUCOPHAGE) 1000 MG tablet Take 1 tablet (1,000 mg) by mouth 2 times daily (with meals)       nitroGLYcerin (NITROSTAT) 0.4 MG sublingual tablet For chest pain place 1 tablet under the tongue every 5 minutes for 3 doses. If symptoms persist 5 minutes after 1st dose call 911. 25 tablet 0     omeprazole (PRILOSEC) 40 MG DR capsule TAKE 1 CAPSULE(40 MG) BY MOUTH DAILY 30 TO 60 MINUTES BEFORE A MEAL 90 capsule 0     order for DME Equipment being ordered: wheeled walker with hand breaks and seat 1 each 0     order for DME Equipment being ordered: True Matrix Blood Glucose meter. 1 Act 11     polyethylene glycol-propylene glycol (SYSTANE ULTRA) 0.4-0.3 % SOLN ophthalmic solution Place 2 drops into both eyes daily as needed for dry eyes       sennosides (SENOKOT) 8.6 MG tablet Take 2 tablets by mouth daily Hold for loose stools       tamsulosin (FLOMAX) 0.4 MG capsule TAKE 1 CAPSULE BY MOUTH DAILY 90 capsule 3     ticagrelor (BRILINTA) 90 MG tablet Take 1 tablet (90 mg) by mouth 2 times daily 180 tablet 1     TRUE METRIX BLOOD GLUCOSE TEST test strip USE TO TEST THREE TIMES DAILY OR AS DIRECTED 300 strip 0       Medication Changes/Rationale:     Lisinopril discontinued due to hypotension.     Bowel regimen adjusted     Controlled medications sent with patient:   not applicable/none     ROS:   4 point ROS including Respiratory, CV, GI and , other than that noted in the HPI,  is negative    Physical Exam:   Vitals: /82   Pulse 104   Temp 97.5  F (36.4  C)   Resp 16   Ht 1.676 m (5' 6\")   Wt 67 kg (147 lb 12.8 oz)   SpO2 97%   BMI 23.86 kg/m    BMI= Body mass index is 23.86 kg/m .  GENERAL APPEARANCE:  Alert, in no distress, frail appearing  ENT:  [place]  EYES:  EOM normal, conjunctiva and lids normal  RESP:  no respiratory distress  M/S:   gait slow with walker and assist. FERNANDEZ  SKIN:  fading bruises on face and extremities. No visible open areas  PSYCH:  " oriented to self, situation, insight and judgement impaired, memory impaired , affect and mood normal     SNF labs: Recent labs in EPIC reviewed by me today.       DISCHARGE PLAN:    Follow up labs: per PCP    Medical Follow Up:      Follow up with primary care provider within 1-2 weeks    Follow up with Cardiology as scheduled     MTM referral needed and placed by this provider: No    Current [place] scheduled appointments:  Next 5 appointments (look out 90 days)    Gideon 10, 2020  1:30 PM CDT  Office Visit with [facility]sey MD  [facility] [address] [facility] [address]  [phone]           Discharge Services: Home Care:  Occupational Therapy, Physical Therapy, Speech Therapy , Registered Nurse, Home Health Aide,  and From:  [facility]    Discharge Instructions Verbalized to Patient at Discharge:     Monitor blood glucose per usual home routine. Keep a record of the results and bring it to your primary MD appointment.       TOTAL DISCHARGE TIME:   Greater than 30 minutes  Electronically signed by:  ASPEN Shaikh CNP                   Sincerely,        ASPEN Shaikh CNP

## 2020-05-12 NOTE — LETTER
"5/12/2020      RE: Dustin Pearce  [address]  [place] [address]       Dear Colleague,    Thank you for the opportunity to participate in the care of your patient, Dustin Pearce, at the [facility]. Please see a copy of my visit note below.    Provider notes:  Dustin Pearce is a pleasant 89-year-old male with a history of severe coronary artery disease, and ischemic cardiomyopathy, CVA and history of TIA, peripheral artery disease with prior right carotid endarterectomy, known left carotid occlusion, history of common left iliac stenting and left SFA angioplasty and left axillary and subclavian stenosis.     He was admitted in May 2018 with a non-ST elevation myocardial infarction.  He was found to have severe three-vessel coronary artery disease on coronary angiogram including a  of the RCA, proximal severe LAD disease and severe proximal left circumflex disease.  He underwent stenting to the LAD and circumflex.  An echocardiogram showed an LVEF of 35 to 40% with severe inferolateral wall hypokinesis and moderate inferior wall hypokinesis as well.  He was readmitted shortly after that with recurrent chest discomfort and was diuresed with oral Lasix.     Last year, he was having issues with occasional episodes of syncope.  He had a cardiac work-up including a Ziopatch which showed some nonsustained VT but no bradycardia or other arrhythmias.  Dr. Samuel evaluated him in May 2019 and recommended an EP study as well as a loop recorder implantation although he and his wife elected not to pursue these.     He has had several admissions since that time with \"spells\" concerning for TIAs or possible seizures. His antiepileptic medications have been adjusted. He had recurrent hospitalizations in March and April with these spells and has been at a TCU. He had echocardiograms done during these admissions which showed a LVEF " "of 30 - 35% with regional wall motion abnormalities.     I had a visit with Dustin and spoke with his wife on 5/1. We reviewed his echocardiogram which showed some deterioration in his LV function from 40 - 45% to 30 - 35%. His wife agreed that invasive or aggressive work up would not be appropriate. He was on Metoprolol xL At home 12.5 mg daily. I made no changes as he was having recurrent falls but suggested potentially using lisinopril if he needed further antihypertensive therapy.     He unfortunately was subsequently admitted that day from the TCU After a fall. He was found to have diverticulitis on CT and was treated with antibiotics. EKG showed bradycardia on admission. Metoprolol XL was held and a Ziopatch monitor was ordered. He was restarted on lisinopril 5 mg daily for BP control. It appears a few days ago this was discontinued at the TCU as his blood pressures were in the 90s systolic.     I spoke with Dustin today. He is not able to provide much history. He has been feeling okay. He endorses some mild lightheadedness. He continues to fall. He denies shortness of breath.  I also spoke with his nurse today Portia. He continues to have regular falls. His blood pressures have been somewhat low but stable. He has not had any low heart rates documented and in fact at times has been mildly elevated up to 107 bpm.     Assessment/plan:  Dustin Pearce is a pleasant 89-year-old male with a history of an ischemic cardiomyopathy with an EF now down to 30 to 35%, severe coronary artery disease with prior stenting of the circumflex and LAD, severe PAD among other medical comorbidities as noted above. He has had recurrent hospitalizations for \"spells\" concerning for possible seizures and has been having difficulties with frequent falls recently. During his recent admission after a fall he was noted to be bradycardic. His low dose beta blocker (MEtoprolol XL 12.5 mg daily) was stopped. Heart rate was subsequently stable on " telemetry. He is currently wearing a 14 day Ziopatch to assess for arrhythmias. Will follow up on the results of that. It seems if anything his heart rate is running on the faster side per his nurses's report today.    He continues to be free of any congestive heart failure symptoms. At this point, he is not on therapy for his cardiomyopathy. His beta blocker was stopped as above. He was restarted on lisinopril recently but this was discontinued a few days ago due to borderline low blood pressures.    He has been tolerating and remains on DAPT therapy at this time per his prior discussion with Dr. Samuel. He is also on high intensity statin therapy.     I made no changes to his regimen today. Will await the results of his Ziopatch monitor and arrange follow up if necessary from there. Otherwise, I would suggest as before that he follows up with Dr. Samuel sometime this summer.     Please do not hesitate to contact me if you have any questions/concerns.     Sincerely,     Janis Greene PA-C

## 2020-05-12 NOTE — PROGRESS NOTES
"Dustin Pearce is a 89 year old male who is being evaluated via a billable telephone visit.      The patient has been notified of following:     \"This telephone visit will be conducted via a call between you and your physician/provider. We have found that certain health care needs can be provided without the need for a physical exam.  This service lets us provide the care you need with a short phone conversation.  If a prescription is necessary we can send it directly to your pharmacy.  If lab work is needed we can place an order for that and you can then stop by our lab to have the test done at a later time.    Telephone visits are billed at different rates depending on your insurance coverage. During this emergency period, for some insurers they may be billed the same as an in-person visit.  Please reach out to your insurance provider with any questions.    If during the course of the call the physician/provider feels a telephone visit is not appropriate, you will not be charged for this service.\"    Patient has given verbal consent for Telephone visit?  Yes    What phone number would you like to be contacted at? [phone]    This is the nurse's cell phone at  [phone]  OANH Boyd    How would you like to obtain your AVS? Mail a copy    Provider notes:  Dustin Pearce is a pleasant 89-year-old male with a history of severe coronary artery disease, and ischemic cardiomyopathy, CVA and history of TIA, peripheral artery disease with prior right carotid endarterectomy, known left carotid occlusion, history of common left iliac stenting and left SFA angioplasty and left axillary and subclavian stenosis.     He was admitted in May 2018 with a non-ST elevation myocardial infarction.  He was found to have severe three-vessel coronary artery disease on coronary angiogram including a  of the RCA, proximal severe LAD disease and severe proximal left circumflex disease.  He underwent stenting to " "the LAD and circumflex.  An echocardiogram showed an LVEF of 35 to 40% with severe inferolateral wall hypokinesis and moderate inferior wall hypokinesis as well.  He was readmitted shortly after that with recurrent chest discomfort and was diuresed with oral Lasix.     Last year, he was having issues with occasional episodes of syncope.  He had a cardiac work-up including a Ziopatch which showed some nonsustained VT but no bradycardia or other arrhythmias.  Dr. Samuel evaluated him in May 2019 and recommended an EP study as well as a loop recorder implantation although he and his wife elected not to pursue these.     He has had several admissions since that time with \"spells\" concerning for TIAs or possible seizures. His antiepileptic medications have been adjusted. He had recurrent hospitalizations in March and April with these spells and has been at a TCU. He had echocardiograms done during these admissions which showed a LVEF of 30 - 35% with regional wall motion abnormalities.     I had a visit with Dustin and spoke with his wife on 5/1. We reviewed his echocardiogram which showed some deterioration in his LV function from 40 - 45% to 30 - 35%. His wife agreed that invasive or aggressive work up would not be appropriate. He was on Metoprolol xL At home 12.5 mg daily. I made no changes as he was having recurrent falls but suggested potentially using lisinopril if he needed further antihypertensive therapy.     He unfortunately was subsequently admitted that day from the TCU After a fall. He was found to have diverticulitis on CT and was treated with antibiotics. EKG showed bradycardia on admission. Metoprolol XL was held and a Ziopatch monitor was ordered. He was restarted on lisinopril 5 mg daily for BP control. It appears a few days ago this was discontinued at the TCU as his blood pressures were in the 90s systolic.     I spoke with Dustin today. He is not able to provide much history. He has been feeling okay. He " "endorses some mild lightheadedness. He continues to fall. He denies shortness of breath.  I also spoke with his nurse today Portia. He continues to have regular falls. His blood pressures have been somewhat low but stable. He has not had any low heart rates documented and in fact at times has been mildly elevated up to 107 bpm.     Assessment/plan:  Dustin Pearce is a pleasant 89-year-old male with a history of an ischemic cardiomyopathy with an EF now down to 30 to 35%, severe coronary artery disease with prior stenting of the circumflex and LAD, severe PAD among other medical comorbidities as noted above. He has had recurrent hospitalizations for \"spells\" concerning for possible seizures and has been having difficulties with frequent falls recently. During his recent admission after a fall he was noted to be bradycardic. His low dose beta blocker (MEtoprolol XL 12.5 mg daily) was stopped. Heart rate was subsequently stable on telemetry. He is currently wearing a 14 day Ziopatch to assess for arrhythmias. Will follow up on the results of that. It seems if anything his heart rate is running on the faster side per his nurses's report today.    He continues to be free of any congestive heart failure symptoms. At this point, he is not on therapy for his cardiomyopathy. His beta blocker was stopped as above. He was restarted on lisinopril recently but this was discontinued a few days ago due to borderline low blood pressures.    He has been tolerating and remains on DAPT therapy at this time per his prior discussion with Dr. Samuel. He is also on high intensity statin therapy.     I made no changes to his regimen today. Will await the results of his Ziopatch monitor and arrange follow up if necessary from there. Otherwise, I would suggest as before that he follows up with Dr. Samuel sometime this summer.     Total call time: 6 minutes    JOVI Greene PA-C 12:32 PM 5/12/2020   "

## 2020-05-12 NOTE — PROGRESS NOTES
[facility] DISCHARGE SUMMARY  PATIENT'S NAME: Dustin Pearce  YOB: 1928  MEDICAL RECORD NUMBER:  [id number]  Place of Service where encounter took place:  [facility] [[id number]]    PRIMARY CARE PROVIDER AND CLINIC RESPONSIBLE AFTER TRANSFER:   Matt Morrissey MD, [address] / [place] [address]    [facility] Provider     Transferring providers: ASPEN Shaikh CNP, Fe Kim MD  Recent Hospitalization/ED:  [facility] Hospital stay 5/1/20 to 5/4/20.  Date of SNF Admission: May / 04 / 2020  Date of SNF (anticipated) Discharge: May / 15 / 2020  Discharged to: previous independent home with significant other   Cognitive Scores: SLUMS: 11/30  DME: Walker    CODE STATUS/ADVANCE DIRECTIVES DISCUSSION:  Full Code   ALLERGIES: Oxycodone; Sulfa drugs; and Tetracycline    DISCHARGE DIAGNOSIS/NURSING FACILITY COURSE:   He initially came to this facility 4/21/2020 following hospitalization 4/18/2020 for weakness and word finding difficulty, felt to be due to seizure activity. Imaging was negative for stroke. Vimpat was increased per Neurology and Keppra was continued. ECHO showed EF 30-35% with mid to basal inferolateral akinesis-unchanged from 3/2020.   He was sent to the ED 5/1/2020 with marked change in mental status after an unwitnessed fall. CT head showed no hemorrhage or acute pathology. CT chest/abdomen/pelvis showed minimal inflammation around a sigmoid colonic diverticula, consistent with minimal diverticulitis. He was treated with Zosyn and discharged on Augmentin. EKG showed sinus bradycardia. Cardiology consulted and metoprolol was discontinued. Ziopatch was placed prior to hospital discharge.     He has worked with PHYSICAL THERAPY/OT and SPEECH THERAPY with good progress. Ambulating 500 ft with walker and stand by to contact guard assist. Able to do 9 stairs with hand rails and assist. TUG 35 seconds, indicating high  fall risk. Requires supervision to min assist with cares.   ASSESSMENT / PLAN:  (K57.92) Diverticulitis  (primary encounter diagnosis)  Comment: infection appears resolved. Afebrile and no GI symptoms. He has reached max potential in therapies and is stable for discharge.   Plan: complete course of Augmentin 5/14/2020. Discharge home with significant other Halina Dhillon-attempted to reach her today without success and will try again.  Home care services and follow up as below.     (G40.909) Seizure disorder (H)  Comment: no seizure activity has been observed during his tcu stay.   Plan: continue Vimpat and Keppra. Neurology follow up per usual routine.     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 HR has ranged: 73-93. Ziopatch remains in place. Metoprolol remains on hold. Lisinopril was discontinued due to hypotension.   Plan: Cardiology follow up later today as scheduled. Continue ASA, statin.     (I73.9) PAD (peripheral artery disease) (H)  Comment: no acute issues   Plan: continue current medication regimen     (E11.8,  Z79.4) Type 2 diabetes mellitus with complication, with long-term current use of insulin (H)  Comment: controlled with blood sugars: , outlier 257  Plan: continue current dose of glargine and metformin. Monitor blood sugars per usual home routine.     (D64.9) Anemia, unspecified type  Comment: chronic, no s/s of active bleeding. Hgb stable at 10.8 at hospital discharge   Plan: follow up labs per PCP     (I10) Benign essential hypertension  Comment: lisinopril was started during his second hospitalization and subsequently discontinued while on tcu due to hypotension.  Recent BPs improved: 132/82, 95/58, 123/62, 136/70    Plan: follow up with PCP. Home care nurse referral for VS monitoring. Avoid hypotension due to advanced age and fall risk.     (F32.9) Depression, unspecified depression type  Comment: appears well managed   Plan: continue duloxetine.     (R41.89) Cognitive impairment  History of CVA   Comment: moderate to severe deficits on cognitive testing, which appears to be his baseline. Tolerating DD3 with thin liquids.   Plan: home care referral. His SO assists with all cares and med management. Continue ASA, ticagrelor, statin for history of CVA.     Frequent falls  Comment: he's had falls while on tcu, most recent was a few days ago. Reports some mild back pain after the fall. No weakness or signs of an acute injury.   Plan: tylenol bid and daily prn. Fall precautions .    (R53.81) Physical deconditioning  Comment: progress in therapies as above   Plan: home therapies for ongoing gait training, strengthening and ADL safety         Past Medical History:  has a past medical history of Abdominal aortic aneurysm (H) (12/25/2016), Acute on chronic systolic congestive heart failure (H) (6/7/2018), Anemia (6/7/2018), Anemia due to blood loss, acute (6/13/2017), Aortic stenosis, Benign essential hypertension (1/6/2017), Benign non-nodular prostatic hyperplasia with lower urinary tract symptoms (10/20/2016), CAD (coronary artery disease), Carotid artery stenosis (10/20/2016), Cerebrovascular accident (H) (10/20/2016), Cognitive impairment (6/7/2018), Coronary artery disease of native artery of native heart with stable angina pectoris (H) (10/20/2016), Depression, unspecified depression type (2/22/2018), Diabetes mellitus (H), Diabetic ulcer of left foot associated with diabetes mellitus due to underlying condition (H) (6/12/2017), 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) (6/12/2017), Gastro-oesophageal reflux disease, Gastroesophageal reflux disease without esophagitis  (10/20/2016), Heart attack (H), Hyperlipidemia, Hyperlipidemia, unspecified hyperlipidemia type (10/20/2016), Ischemic cardiomyopathy, Lumbar back pain (12/30/2016), Mild cognitive impairment (10/20/2016), Nephrolithiasis, NSTEMI (non-ST elevated myocardial infarction) (H) (6/7/2018), Osteopenia, PAD (peripheral artery disease) (H), Paroxysmal atrial fibrillation (H), Peripheral artery disease (H), RBBB with left anterior fascicular block, Slow transit constipation (2/22/2018), Stroke of unknown etiology (H) (12/31/2017), Syncope, TIA (transient ischemic attack) (1/20/2017), Unspecified cerebral artery occlusion with cerebral infarction, UTI (urinary tract infection) due to Enterococcus (2/22/2018), and Ventricular tachycardia (H). He also has no past medical history of Difficult intubation or Malignant hyperthermia.    Discharge Medications:    Current Outpatient Medications   Medication Sig Dispense Refill     acetaminophen (TYLENOL) 500 MG tablet Take 1,000 mg by mouth 2 times daily Plus 1000 mg daily as needed for pain       aspirin EC 81 MG EC tablet Take 1 tablet (81 mg) by mouth daily 30 tablet 0     atorvastatin (LIPITOR) 40 MG tablet Take 40 mg by mouth daily       blood glucose monitoring (NO BRAND SPECIFIED) meter device kit Use to test blood sugar bid times daily or as directed. 1 kit 0     DULoxetine (CYMBALTA) 30 MG capsule TAKE 1 CAPSULE(30 MG) BY MOUTH DAILY 90 capsule 2     insulin glargine (LANTUS PEN) 100 UNIT/ML pen Inject 26 Units Subcutaneous At Bedtime       ipratropium (ATROVENT) 0.03 % nasal spray INHALE 1 SPRAY IN EACH NOSTRIL EVERY 12 HOURS AS NEEDED 30 mL 11     Lacosamide (VIMPAT) 100 MG TABS tablet Take 1 tablet (100 mg) by mouth 2 times daily       levETIRAcetam (KEPPRA) 750 MG tablet Take 1 tablet (750 mg) by mouth 2 times daily       MAGNESIUM-OXIDE 400 (241.3 Mg) MG tablet TAKE 1 TABLET BY MOUTH TWICE DAILY 180 tablet 1     metFORMIN (GLUCOPHAGE) 1000 MG tablet Take 1 tablet (1,000  "mg) by mouth 2 times daily (with meals)       nitroGLYcerin (NITROSTAT) 0.4 MG sublingual tablet For chest pain place 1 tablet under the tongue every 5 minutes for 3 doses. If symptoms persist 5 minutes after 1st dose call 911. 25 tablet 0     omeprazole (PRILOSEC) 40 MG DR capsule TAKE 1 CAPSULE(40 MG) BY MOUTH DAILY 30 TO 60 MINUTES BEFORE A MEAL 90 capsule 0     order for DME Equipment being ordered: wheeled walker with hand breaks and seat 1 each 0     order for DME Equipment being ordered: True Matrix Blood Glucose meter. 1 Act 11     polyethylene glycol-propylene glycol (SYSTANE ULTRA) 0.4-0.3 % SOLN ophthalmic solution Place 2 drops into both eyes daily as needed for dry eyes       sennosides (SENOKOT) 8.6 MG tablet Take 2 tablets by mouth daily Hold for loose stools       tamsulosin (FLOMAX) 0.4 MG capsule TAKE 1 CAPSULE BY MOUTH DAILY 90 capsule 3     ticagrelor (BRILINTA) 90 MG tablet Take 1 tablet (90 mg) by mouth 2 times daily 180 tablet 1     TRUE METRIX BLOOD GLUCOSE TEST test strip USE TO TEST THREE TIMES DAILY OR AS DIRECTED 300 strip 0       Medication Changes/Rationale:     Lisinopril discontinued due to hypotension.     Bowel regimen adjusted     Controlled medications sent with patient:   not applicable/none     ROS:   4 point ROS including Respiratory, CV, GI and , other than that noted in the HPI,  is negative    Physical Exam:   Vitals: /82   Pulse 104   Temp 97.5  F (36.4  C)   Resp 16   Ht 1.676 m (5' 6\")   Wt 67 kg (147 lb 12.8 oz)   SpO2 97%   BMI 23.86 kg/m    BMI= Body mass index is 23.86 kg/m .  GENERAL APPEARANCE:  Alert, in no distress, frail appearing  ENT:  [place]  EYES:  EOM normal, conjunctiva and lids normal  RESP:  no respiratory distress  M/S:   gait slow with walker and assist. FERNANDEZ  SKIN:  fading bruises on face and extremities. No visible open areas  PSYCH:  oriented to self, situation, insight and judgement impaired, memory impaired , affect and mood normal "     SNF labs: Recent labs in EPIC reviewed by me today.       DISCHARGE PLAN:    Follow up labs: per PCP    Medical Follow Up:      Follow up with primary care provider within 1-2 weeks    Follow up with Cardiology as scheduled     MTM referral needed and placed by this provider: No    Current [facility] scheduled appointments:  Next 5 appointments (look out 90 days)    Gideon 10, 2020  1:30 PM CDT  Office Visit with Matt Morrissey MD  [facility] [address] [facility] [address]  [phone]           Discharge Services: Home Care:  Occupational Therapy, Physical Therapy, Speech Therapy , Registered Nurse, Home Health Aide,  and From:  [facility]    Discharge Instructions Verbalized to Patient at Discharge:     Monitor blood glucose per usual home routine. Keep a record of the results and bring it to your primary MD appointment.       TOTAL DISCHARGE TIME:   Greater than 30 minutes  Electronically signed by:  ASPEN Shaikh CNP

## 2020-05-13 NOTE — PATIENT INSTRUCTIONS
[facility] Discharge Orders    Name: Dustin Pearce  : 1928  Planned Discharge Date: 5/15/2020  Discharged to: previous independent home with his significant other    MEDICAL FOLLOW UP  Follow up with PCP within 1-2 weeks   Follow up with Neurology and Cardiology as scheduled.       Next 5 appointments (look out 90 days)    Gideon 10, 2020  1:30 PM CDT  Office Visit with Matt Morrissey MD  [facility] [address] [place] [address] [phone]          FUTURE LABS: per primary MD       DISCHARGE MEDICATIONS:    Current Outpatient Medications   Medication Sig Dispense Refill     acetaminophen (TYLENOL) 500 MG tablet Take 1,000 mg by mouth 2 times daily Plus 1000 mg daily as needed for pain       aspirin EC 81 MG EC tablet Take 1 tablet (81 mg) by mouth daily 30 tablet 0     atorvastatin (LIPITOR) 40 MG tablet Take 40 mg by mouth daily       blood glucose monitoring (NO BRAND SPECIFIED) meter device kit Use to test blood sugar bid times daily or as directed. 1 kit 0     DULoxetine (CYMBALTA) 30 MG capsule TAKE 1 CAPSULE(30 MG) BY MOUTH DAILY 90 capsule 2     insulin glargine (LANTUS PEN) 100 UNIT/ML pen Inject 26 Units Subcutaneous At Bedtime       ipratropium (ATROVENT) 0.03 % nasal spray INHALE 1 SPRAY IN EACH NOSTRIL EVERY 12 HOURS AS NEEDED 30 mL 11     Lacosamide (VIMPAT) 100 MG TABS tablet Take 1 tablet (100 mg) by mouth 2 times daily       levETIRAcetam (KEPPRA) 750 MG tablet Take 1 tablet (750 mg) by mouth 2 times daily       MAGNESIUM-OXIDE 400 (241.3 Mg) MG tablet TAKE 1 TABLET BY MOUTH TWICE DAILY 180 tablet 1     metFORMIN (GLUCOPHAGE) 1000 MG tablet Take 1 tablet (1,000 mg) by mouth 2 times daily (with meals)       nitroGLYcerin (NITROSTAT) 0.4 MG sublingual tablet For chest pain place 1 tablet under the tongue every 5 minutes for 3 doses. If symptoms persist 5 minutes after 1st dose call 911. 25 tablet 0     omeprazole (PRILOSEC) 40  MG DR capsule TAKE 1 CAPSULE(40 MG) BY MOUTH DAILY 30 TO 60 MINUTES BEFORE A MEAL 90 capsule 0     order for DME Equipment being ordered: wheeled walker with hand breaks and seat 1 each 0     order for DME Equipment being ordered: True Matrix Blood Glucose meter. 1 Act 11     polyethylene glycol-propylene glycol (SYSTANE ULTRA) 0.4-0.3 % SOLN ophthalmic solution Place 2 drops into both eyes daily as needed for dry eyes       sennosides (SENOKOT) 8.6 MG tablet Take 2 tablets by mouth daily Hold for loose stools       tamsulosin (FLOMAX) 0.4 MG capsule TAKE 1 CAPSULE BY MOUTH DAILY 90 capsule 3     ticagrelor (BRILINTA) 90 MG tablet Take 1 tablet (90 mg) by mouth 2 times daily 180 tablet 1     TRUE METRIX BLOOD GLUCOSE TEST test strip USE TO TEST THREE TIMES DAILY OR AS DIRECTED 300 strip 0       SERVICES:  Home Care:  Occupational Therapy, Physical Therapy, Speech Therapy , Registered Nurse, Home Health Aide,  and From:  [facility]    ADDITIONAL INSTRUCTIONS:  Monitor blood glucose per your usual home routine. Keep a record and bring it to your next primary provider visit.     ASPEN Shaikh CNP     This document was electronically signed on May 13, 2020

## 2020-05-19 NOTE — TELEPHONE ENCOUNTER
Verbal approval given per request below.Homecare/hospice agency to fax orders for provider signature.     Kathy WISEMAN RN

## 2020-05-19 NOTE — LETTER
[facility]  Complex Care Plan  About Me:    Patient Name:  Dustin Pearce    YOB: 1928  Age:         89 year old   [facility] MRN:    [id number] Telephone Information:  Home Phone [phone]   Mobile [phone]       Address:  [address]  [place] [facility] [address] Email address:  No e-mail address on record      Emergency Contact(s)    Name Relationship Lgl Grd Work Phone Home Phone Mobile Phone   1. DOLORES SMYTH Significant ot* No [phone]   2. DECLINED, PER * Declined               Primary language:  English     needed? No   [facility]:  [phone] op. 1  Other communication barriers: None  Preferred Method of Communication:  Mail  Current living arrangement: I live in a private home  Mobility Status/ Medical Equipment: Independent w/Device    Health Maintenance  Health Maintenance Reviewed: Not assessed    My Access Plan  Medical Emergency 911   Primary Clinic Line [facility] - [phone]   24 Hour Appointment Line [phone] or  [phone]) (toll-free)   24 Hour Nurse Line [phone] (toll-free)   Preferred Urgent Care [facility], [phone]   Preferred Hospital [facility]  [phone]   [id number] [address] AT [facility] [address]     Behavioral Health Crisis Line The National Suicide Prevention Lifeline at 1-369.742.2688 or 911             My Care Team Members  Patient Care Team       Relationship Specialty Notifications Start End    Matt Morrissey MD PCP - General Internal Medicine  12/18/16     Phone: [phone] Pager: [phone] Fax: [phone] [address] [place] [address]    Matt Morrissey MD PCP - Internal Medicine Internal Medicine  11/4/16     Phone: [phone] Pager: [phone] Fax: [phone] [address] [place]  [address]    Hospice, [facility] ([facility] ([place]  6/9/18     Fax: [phone]          [address] [place] [address]    Care, [facility]  HOME [facility]ALTH AGENCY ([facility]), ([place])  6/17/18     Phone: [phone]         Matt Morrissey MD Assigned PCP   6/17/18     Phone: [phone] Pager: [phone] Fax: [phone] [address] [place] [address]    Farnaz Eddy, RN Lead Care Coordinator Primary Care - [facility] Admissions 3/10/20     Phone: [phone]                 My Care Plans  Self Management and Treatment Plan  Goals and (Comments)  Goals        General    Functional (pt-stated)     Notes - Note edited  5/19/2020 11:09 AM by Farnaz Eddy, RN    Goal Statement: I will do my daily PT exercises to keep up my strength  Date Goal set: 3/31/2020  Barriers: None identified  Strengths: Halina is very supportive  Date to Achieve By: 8/1/2020  Patient expressed understanding of goal: *Halina expresses good understanding  Action steps to achieve this goal:  1. I will use my cane or walker for safety  2. I will sit down if I get dizzy  3. I will drink plenty of water to prevent dehydration(recommended 6-8 glasses a day  4. I will continue [facility] services 2 times a week  5. Care coordinator will support home care staff and Halina as needed   As of today's date 5/19/2020 goal is met at 0 - 25%.   Goal Status:  Active              Action Plans on File: None                      Advance Care Plans/Directives Type:   Type Advanced Care Plans/Directives: POLST    My Medical and Care Information  Problem List   Patient Active Problem List   Diagnosis     Coronary artery disease of native artery of native heart with stable angina pectoris (H)     Hyperlipidemia LDL goal <70     Benign non-nodular prostatic hyperplasia with lower urinary tract symptoms     Gastroesophageal reflux disease without esophagitis     Cerebrovascular accident (H)     Carotid artery  stenosis     Abdominal aortic aneurysm (H)     Lumbar back pain     Benign essential hypertension     TIA (transient ischemic attack)     Ischemic cardiomyopathy     Aortic root dilatation (H)     Physical deconditioning     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)     Anemia due to blood loss, acute     Diarrhea, unspecified type     Nausea and vomiting, intractability of vomiting not specified, unspecified vomiting type     Acute pain of left shoulder     Balance problems     Generalized muscle weakness     PAD (peripheral artery disease) (H)     Aortic stenosis     RBBB with left anterior fascicular block     Stroke of unknown etiology (H)     Carotid stenosis     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)     History of TIA (transient ischemic attack) and stroke     UTI (urinary tract infection)     UTI (urinary tract infection) due to Enterococcus     Generalized weakness     Type 2 diabetes mellitus with complication, with long-term current use of insulin (H)     Atherosclerosis of coronary artery of native heart without angina pectoris, unspecified vessel or lesion type     Depression, unspecified depression type     Slow transit constipation     Severe sepsis (H)     Chest discomfort     NSTEMI (non-ST elevated myocardial infarction) (H)     Anemia     Cognitive impairment     Benign prostatic hyperplasia without lower urinary tract symptoms     Other seizures (H)     Expressive aphasia     Spells of speech arrest     Facial droop     Dysarthria     Spell of altered cognition     H/O TIA (transient ischemic attack) and stroke     Seizure disorder (H)     Occlusion of left internal carotid artery     History of CEA (carotid endarterectomy), Right     Coronary artery disease involving native heart without angina pectoris, unspecified vessel or lesion type, h/o MODESTA     Essential hypertension     Debility     Spell of change in speech     Seizure (H)     Altered mental status      Stroke (H)     Diverticulitis      Current Medications and Allergies:    Current Outpatient Medications   Medication     acetaminophen (TYLENOL) 500 MG tablet     aspirin (ASA) 81 MG EC tablet     atorvastatin (LIPITOR) 40 MG tablet     blood glucose monitoring (NO BRAND SPECIFIED) meter device kit     DULoxetine (CYMBALTA) 30 MG capsule     insulin glargine (LANTUS PEN) 100 UNIT/ML pen     ipratropium (ATROVENT) 0.03 % nasal spray     Lacosamide (VIMPAT) 100 MG TABS tablet     levETIRAcetam (KEPPRA) 750 MG tablet     magnesium oxide (MAGNESIUM-OXIDE) 400 (241.3 Mg) MG tablet     metFORMIN (GLUCOPHAGE) 1000 MG tablet     nitroGLYcerin (NITROSTAT) 0.4 MG sublingual tablet     omeprazole (PRILOSEC) 40 MG DR capsule     order for DME     order for DME     polyethylene glycol-propylene glycol (SYSTANE ULTRA) 0.4-0.3 % SOLN ophthalmic solution     sennosides (SENOKOT) 8.6 MG tablet     tamsulosin (FLOMAX) 0.4 MG capsule     ticagrelor (BRILINTA) 90 MG tablet     TRUE METRIX BLOOD GLUCOSE TEST test strip     No current facility-administered medications for this visit.      Care Coordination Start Date: 3/30/2020   Frequency of Care Coordination: monthly   Form Last Updated: 05/19/2020

## 2020-05-19 NOTE — PROGRESS NOTES
Clinic Care Coordination Contact  Care Coordination Communication    Referral Source: IP Report    Clinical Data:   Recent Hospitalization/ED:  [facility] Hospital stay 5/1/20 to 5/4/20  Discharge Diagnoses      Diverticulitis  Severe sepsis due to diverticulitis.  Date of SNF Admission: May / 04 / 2020  Date of SNF (anticipated) Discharge: May / 15 / 2020    Home Care Contact:              Home Care Agency: [facility]               Contact name () and phone number: Deborah Mcdonaldjessica [facility] RN              Care Coordination contacted home care: Yes              Anticipated start of care date: Today     Patient Contact:               Introduced self and role of care coordination.               Discharge instructions were reviewed with patient/caregiver.               Do you have any questions about your medications? No              Follow up appointment is scheduled for will call and set up a future appointment              Provided 24 Hour Nurse Line and/or 24 Hour Appointment Scheduling: Yes              Home care has contacted patient: Yes              Patient questions/concerns: CC spoke to Halina/significant other  and Deborah Mcdonaldjessica [facility] who is in the home making a visit .  Halina reports the patient is doing well and they are reviewing the discharge instructions and medication changes right now with a home care visit .  Deborah agrees to makr a future hospital follow up with PCP    Goal Statement: I will do my daily PT exercises to keep up my strength  Date Goal set: 3/31/2020  Barriers: None identified  Strengths: Halina is very supportive  Date to Achieve By: 8/1/2020  Patient expressed understanding of goal: *Halina expresses good understanding  Action steps to achieve this goal:  1. I will use my cane or walker for safety  2. I will sit down if I get dizzy  3. I will drink plenty of water to prevent dehydration(recommended 6-8 glasses a day  4. I will continue [facility]  services 2 times a week  5. Care coordinator will support home care staff and Halina as needed   As of today's date 5/19/2020 goal is met at 0 - 25%.   Goal Status:  Active       Plan: RN/SW Care Coordinator will await notification from home care staff informing RN/SW Care Coordinator of patients discharge plans/needs. RN/SW Care Coordinator will review chart and outreach to home care every 4 weeks and as needed.      [facility]     Farnaz Eddy  RN Care Coordinator   [facility] / [facility]   Phone: [phone]  Email :  [email or web address]

## 2020-05-19 NOTE — TELEPHONE ENCOUNTER
Reason for Call:  Home Health Care    Deborah with [facility] called regarding (reason for call): Verbal orders    Orders are needed for this patient. Skilled nursing and PT, and OT    PT: Evaluate and treat    OT: Evaluate and treat    Skilled Nursinx week 1, 2x week 3 and 2 prn    Pt Provider: bindu    Phone Number Homecare Nurse can be reached at: [phone]     Can we leave a detailed message on this number? YES    Phone number patient can be reached at: Other phone number:[phone]     Best Time: Any    Call taken on 2020 at 2:51 PM by Tasia Weaver

## 2020-05-21 NOTE — TELEPHONE ENCOUNTER
Returned call. OK'd requested orders.  Orders will be faxed to PCP for review and signature.   Cindy Cool RN

## 2020-05-22 NOTE — PROGRESS NOTES
"Dustin Pearce is a 89 year old male who is being evaluated via a billable telephone visit.      The patient has been notified of following:     \"This telephone visit will be conducted via a call between you and your physician/provider. We have found that certain health care needs can be provided without the need for a physical exam.  This service lets us provide the care you need with a short phone conversation.  If a prescription is necessary we can send it directly to your pharmacy.  If lab work is needed we can place an order for that and you can then stop by our lab to have the test done at a later time.    Telephone visits are billed at different rates depending on your insurance coverage. During this emergency period, for some insurers they may be billed the same as an in-person visit.  Please reach out to your insurance provider with any questions.    If during the course of the call the physician/provider feels a telephone visit is not appropriate, you will not be charged for this service.\"    Patient has given verbal consent for Telephone visit?  Yes    What phone number would you like to be contacted at? [phone]    How would you like to obtain your AVS? Mail a copy    Subjective     Dustin Pearce is a 89 year old male who presents via phone visit today for the following health issues:    HPI         Hospitalized for syncope and then shortly afterward for diverticular disease, was in TCU and had frequent falls there, beta blocker stopped, SO having significant challenges caring for him at home as he is very deconditioned and not tolerating therapies.  He did not wish to speak today as he was napping and too tired to wake and speak to me.  SO states he looks like; he has been through world war 3 and lost'.      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)     Anemia due to blood loss, acute     Diarrhea, unspecified type     Nausea and vomiting, intractability of vomiting not specified, unspecified vomiting type     Acute pain of left shoulder     Balance problems     Generalized muscle weakness     PAD (peripheral artery disease) (H)     Aortic stenosis     RBBB with left anterior fascicular block     Stroke of unknown etiology (H)     Carotid stenosis     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)     History of TIA (transient ischemic attack) and stroke     UTI (urinary tract infection)     UTI (urinary tract infection) due to Enterococcus     Generalized weakness     Type 2 diabetes mellitus with complication, with long-term current use of insulin (H)     Atherosclerosis of coronary artery of native heart without angina pectoris, unspecified vessel or lesion type     Depression, unspecified depression type     Slow transit constipation     Severe sepsis (H)     Chest discomfort     NSTEMI (non-ST elevated myocardial infarction) (H)     Anemia     Cognitive impairment     Benign prostatic hyperplasia without lower urinary tract symptoms     Other seizures (H)     Expressive aphasia     Spells of speech arrest     Facial droop     Dysarthria     Spell of altered cognition     H/O TIA (transient ischemic attack) and stroke     Seizure disorder (H)     Occlusion of left internal carotid artery     History of CEA (carotid endarterectomy), Right     Coronary artery disease involving native heart without angina  pectoris, unspecified vessel or lesion type, h/o MODESTA     Essential hypertension     Debility     Spell of change in speech     Seizure (H)     Altered mental status     Stroke (H)     Diverticulitis     Past Surgical History:   Procedure Laterality Date     AMPUTATE FOOT Left 2017    Procedure: AMPUTATE FOOT;  Sun Add#3: partial left foot amputation - Sagital Saw - Mini C-Arm;  Surgeon: Moe Kirk DPM;  Location:  OR     CHOLECYSTECTOMY       ENDARTERECTOMY CAROTID Right 1/3/2018    Procedure: ENDARTERECTOMY CAROTID;  RIGHT CAROTID ENDARTERECTOMY WITH EEG;  Surgeon: Roland Rodriguez MD;  Location:  OR     ESOPHAGOSCOPY, GASTROSCOPY, DUODENOSCOPY (EGD), COMBINED N/A 2016    Procedure: COMBINED ESOPHAGOSCOPY, GASTROSCOPY, DUODENOSCOPY (EGD);  Surgeon: Nasim Louis MD;  Location:  GI     GENITOURINARY SURGERY      KIDNEY STONE REMOVAL X 4     HC UGI ENDOSCOPY W EUS N/A 2016    Procedure: COMBINED ENDOSCOPIC ULTRASOUND, ESOPHAGOSCOPY, GASTROSCOPY, DUODENOSCOPY (EGD);  Surgeon: Nasim Louis MD;  Location:  GI     HERNIA REPAIR       INCISION AND DRAINAGE FOOT, COMBINED Left 2017    Procedure: COMBINED INCISION AND DRAINAGE FOOT;  REVISIONAL LEFT FOOT INCISION AND DRAINAGE WITH POSSIBLE FLAP CLOSURE , ANTIBIOTIC SOLUTION ;  Surgeon: Nabil Ann DPM;  Location:  OR     LASER HOLMIUM LITHOTRIPSY URETER(S), INSERT STENT, COMBINED  3/2/2012    Procedure:COMBINED CYSTOSCOPY, URETEROSCOPY, LASER HOLMIUM LITHOTRIPSY URETER(S), INSERT STENT; CYSTOSCOPY, RIGHT RETROGRADES, RIGHT URETEROSCOPY, HOLMIUM LASER, RIGHT STENT PLACEMENT; Surgeon:ANJELICA LEÓN; Location: OR     ROTATOR CUFF REPAIR RT/LT         Social History     Tobacco Use     Smoking status: Former Smoker     Packs/day: 1.00     Years: 30.00     Pack years: 30.00     Types: Cigarettes     Start date:      Last attempt to quit: 1999     Years since quittin.4     Smokeless  tobacco: Never Used   Substance Use Topics     Alcohol use: Not Currently     Alcohol/week: 7.0 standard drinks     Types: 7 Standard drinks or equivalent per week     Comment: 1 drink per biweekly     Family History   Problem Relation Age of Onset     Breast Cancer Mother      Heart Failure Father 81         Current Outpatient Medications   Medication Sig Dispense Refill     acetaminophen (TYLENOL) 500 MG tablet Take 1,000 mg by mouth 2 times daily Plus 1000 mg daily as needed for pain       aspirin (ASA) 81 MG EC tablet Take 1 tablet (81 mg) by mouth daily 30 tablet 0     atorvastatin (LIPITOR) 40 MG tablet Take 1 tablet (40 mg) by mouth daily 30 tablet 0     blood glucose monitoring (NO BRAND SPECIFIED) meter device kit Use to test blood sugar bid times daily or as directed. 1 kit 0     DULoxetine (CYMBALTA) 30 MG capsule TAKE 1 CAPSULE(30 MG) BY MOUTH DAILY 30 capsule 0     insulin glargine (BASAGLAR KWIKPEN) 100 UNIT/ML pen Inject 26 Units Subcutaneous At Bedtime       ipratropium (ATROVENT) 0.03 % nasal spray INHALE 1 SPRAY IN EACH NOSTRIL EVERY 12 HOURS AS NEEDED 30 mL 11     Lacosamide (VIMPAT) 100 MG TABS tablet Take 1 tablet (100 mg) by mouth 2 times daily 60 tablet 0     levETIRAcetam (KEPPRA) 750 MG tablet Take 1 tablet (750 mg) by mouth 2 times daily 60 tablet 0     magnesium oxide (MAGNESIUM-OXIDE) 400 (241.3 Mg) MG tablet Take 1 tablet (400 mg) by mouth 2 times daily 60 tablet 0     metFORMIN (GLUCOPHAGE) 1000 MG tablet Take 1 tablet (1,000 mg) by mouth 2 times daily (with meals) 60 tablet 0     nitroGLYcerin (NITROSTAT) 0.4 MG sublingual tablet For chest pain place 1 tablet under the tongue every 5 minutes for 3 doses. If symptoms persist 5 minutes after 1st dose call 911. 25 tablet 0     omeprazole (PRILOSEC) 40 MG DR capsule TAKE 1 CAPSULE(40 MG) BY MOUTH DAILY 30 TO 60 MINUTES BEFORE A MEAL 30 capsule 0     order for DME Equipment being ordered: wheeled walker with hand breaks and seat 1 each 0  "    order for DME Equipment being ordered: True Matrix Blood Glucose meter. 1 Act 11     polyethylene glycol-propylene glycol (SYSTANE ULTRA) 0.4-0.3 % SOLN ophthalmic solution Place 2 drops into both eyes daily as needed for dry eyes       sennosides (SENOKOT) 8.6 MG tablet Take 2 tablets by mouth daily Hold for loose stools       tamsulosin (FLOMAX) 0.4 MG capsule Take 1 capsule (0.4 mg) by mouth daily 30 capsule 0     ticagrelor (BRILINTA) 90 MG tablet Take 1 tablet (90 mg) by mouth 2 times daily 60 tablet 0     TRUE METRIX BLOOD GLUCOSE TEST test strip USE TO TEST THREE TIMES DAILY OR AS DIRECTED 300 strip 0     Allergies   Allergen Reactions     Oxycodone Other (See Comments)     \"TERRIBLE SWEATING\"     Sulfa Drugs      Tetracycline        Reviewed and updated as needed this visit by Provider         Review of Systems   Unable to obtain due to patient unwillingness            Diagnostic Test Results:  Labs reviewed in Epic        Assessment/Plan:  1. Diverticulitis  2. Seizure (H)    89 year old man with multiple problems frequent hospitalizations and progressive frailty probably nearing the end of life.  I believe he is a candidate for hospice care, and that hospice care would be his best option for a quality of life.  I discussed this at length with his significant other.  She wishes to discuss this with his son.  I think we should arrange additional telephone follow up conferencing the son, Dustin and his significant other to discuss further.  Significant other is not ready for me to ask for the hospice consultation yet understanding that this does no commit Dustin to hospice care, she still want to talk about it with Dustin's son.      Return in about 1 week (around 5/29/2020) for telephone fu with son to discuss goals of care .      Phone call duration:  28 minutes    Matt Morrissey MD        "

## 2020-06-01 PROBLEM — R53.1 WEAKNESS: Status: ACTIVE | Noted: 2020-01-01

## 2020-06-01 NOTE — ED NOTES
Attempted to walk pt with a walker. Pt very unsteady on feet and would have fallen if we were not holding on to him.

## 2020-06-01 NOTE — PHARMACY-ADMISSION MEDICATION HISTORY
Pharmacy Medication History  Admission medication history interview status for the 6/1/2020  admission is complete. See EPIC admission navigator for prior to admission medications     Medication history sources: Patient's family/friend (wife Halina)  Medication history source reliability: Good  Adherence assessment: Good    Significant changes made to the medication list:  none      Additional medication history information:   none    Medication reconciliation completed by provider prior to medication history? No    Time spent in this activity: 15 minutes      Prior to Admission medications    Medication Sig Last Dose Taking? Auth Provider   acetaminophen (TYLENOL) 500 MG tablet Take 1,000 mg by mouth 2 times daily Plus 1000 mg daily as needed for pain 5/31/2020 at pm Yes Reported, Patient   aspirin (ASA) 81 MG EC tablet Take 1 tablet (81 mg) by mouth daily 5/31/2020 at am Yes Alexander Celestin APRN CNP   atorvastatin (LIPITOR) 40 MG tablet Take 1 tablet (40 mg) by mouth daily 5/31/2020 at am Yes Alexander Celestin APRN CNP   DULoxetine (CYMBALTA) 30 MG capsule TAKE 1 CAPSULE(30 MG) BY MOUTH DAILY 5/31/2020 at am Yes Alexander Celestin APRN CNP   insulin glargine (BASAGLAR KWIKPEN) 100 UNIT/ML pen Inject 26 Units Subcutaneous At Bedtime 5/31/2020 at pm Yes Reported, Patient   ipratropium (ATROVENT) 0.03 % nasal spray INHALE 1 SPRAY IN EACH NOSTRIL EVERY 12 HOURS AS NEEDED 6/1/2020 at pm Yes Matt Morrissey MD   Lacosamide (VIMPAT) 100 MG TABS tablet Take 1 tablet (100 mg) by mouth 2 times daily 5/31/2020 at pm Yes Alexander Celestin APRN CNP   levETIRAcetam (KEPPRA) 750 MG tablet Take 1 tablet (750 mg) by mouth 2 times daily 5/31/2020 at pm Yes Alexander Celestin APRN CNP   magnesium oxide (MAGNESIUM-OXIDE) 400 (241.3 Mg) MG tablet Take 1 tablet (400 mg) by mouth 2 times daily 5/31/2020 at pm Yes Alexander Celestin APRN CNP   metFORMIN (GLUCOPHAGE) 1000 MG tablet Take 1 tablet (1,000 mg) by  mouth 2 times daily (with meals) 5/31/2020 at pm Yes Alexander Celestin APRN CNP   nitroGLYcerin (NITROSTAT) 0.4 MG sublingual tablet For chest pain place 1 tablet under the tongue every 5 minutes for 3 doses. If symptoms persist 5 minutes after 1st dose call 911.  at prn Yes Brandyn Graves PA   omeprazole (PRILOSEC) 40 MG DR capsule TAKE 1 CAPSULE(40 MG) BY MOUTH DAILY 30 TO 60 MINUTES BEFORE A MEAL 5/31/2020 at am Yes Alexander Celestin APRN CNP   polyethylene glycol-propylene glycol (SYSTANE ULTRA) 0.4-0.3 % SOLN ophthalmic solution Place 2 drops into both eyes daily as needed for dry eyes  at prn Yes Unknown, Entered By History   sennosides (SENOKOT) 8.6 MG tablet Take 2 tablets by mouth daily Hold for loose stools 5/31/2020 at am Yes Reported, Patient   tamsulosin (FLOMAX) 0.4 MG capsule Take 1 capsule (0.4 mg) by mouth daily 5/31/2020 at am Yes Alexander Celestin APRN CNP   ticagrelor (BRILINTA) 90 MG tablet Take 1 tablet (90 mg) by mouth 2 times daily 5/31/2020 at pm Yes Alexander Celestin APRN CNP Chelsea Mayer, PharmD  Inpatient Clinical Pharmacist  [phone]

## 2020-06-01 NOTE — PLAN OF CARE
Discharge Planner PT   Patient plan for discharge: None stated  Current status: Pt is a 89 year old male under observation status for generalized weakness. At baseline, pt lives with his wife in a home with steps to enter and within the home. Pt reports use of FWW for all ambulation.  This date, pt requires mod assist for bed mobility, fair sitting balance at EOB. Pt performs sit to/from stand with min assist. Pt ambulates 15' with FWW and min assist, increased time to complete.  Barriers to return to prior living situation: Fall risk, fall history, decreased activity tolerance, stairs, assist with all mobility  Recommendations for discharge: TCU  Rationale for recommendations: Pt is well below baseline and would be a considerable fall risk if he returned to home environment. Pt currently requires assist with all mobility. Pt will benefit from continued therapy at TCU to address impairments and increase mobility and functional independence prior to returning home.         Entered by: Portia Beckford 06/01/2020 3:56 PM      Will plan to discharge patient from PT and complete PT order as patient is under OBS status and discharge recommendations are listed above.

## 2020-06-01 NOTE — PROGRESS NOTES
06/01/20 1500   Quick Adds   Type of Visit Initial PT Evaluation   Living Environment   Lives With spouse   Living Arrangements house   Home Accessibility stairs to enter home;stairs within home   Number of Stairs, Main Entrance 3   Stair Railings, Main Entrance railings safe and in good condition;railing on right side (ascending)   Number of Stairs, Within Home, Primary other (see comments);10  (To basement)   Transportation Anticipated family or friend will provide   Self-Care   Usual Activity Tolerance moderate   Current Activity Tolerance poor   Regular Exercise No   Equipment Currently Used at Home walker, rolling   Functional Level Prior   Ambulation 1-->assistive equipment   Transferring 1-->assistive equipment   Fall history within last six months yes   Number of times patient has fallen within last six months 2   Which of the above functional risks had a recent onset or change? ambulation;transferring;fall history   Prior Functional Level Comment Pt reports he uses a FWW for ambulation at all times.   General Information   Onset of Illness/Injury or Date of Surgery - Date 06/01/20   Referring Physician Dr. Fontanez   Patient/Family Goals Statement To walk   Pertinent History of Current Problem (include personal factors and/or comorbidities that impact the POC) Pt is a 89 year old male admitted with slurred speech.   Precautions/Limitations fall precautions   Cognitive Status Examination   Level of Consciousness alert   Pain Assessment   Patient Currently in Pain No   Range of Motion (ROM)   ROM Quick Adds No deficits were identified   Strength   Strength Comments Gross LE strength 4/5 bilaterally   Bed Mobility   Bed Mobility Comments Supine to sit mod assist   Transfer Skills   Transfer Comments Sit to/from stand min assist   Gait   Gait Comments 15' FWW min assist, decreaesd otto and step length, forward bent posture   Balance   Balance Comments Fair in sitting, poor in standing   Clinical Impression  "  Criteria for Skilled Therapeutic Intervention evaluation only   Clinical Presentation Stable/Uncomplicated   Clinical Presentation Rationale VSS, pain controlled   Clinical Decision Making (Complexity) Low complexity   Anticipated Discharge Disposition Transitional Care Facility   Risk & Benefits of therapy have been explained Yes   Patient, Family & other staff in agreement with plan of care Yes   [facility] TM \"6 Clicks\"   2016, Trustees of [facility], under license to [employer].  All rights reserved.   6 Clicks Short Forms Basic Mobility Inpatient Short Form   [facility]  \"6 Clicks\" V.2 Basic Mobility Inpatient Short Form   1. Turning from your back to your side while in a flat bed without using bedrails? 3 - A Little   2. Moving from lying on your back to sitting on the side of a flat bed without using bedrails? 3 - A Little   3. Moving to and from a bed to a chair (including a wheelchair)? 3 - A Little   4. Standing up from a chair using your arms (e.g., wheelchair, or bedside chair)? 3 - A Little   5. To walk in hospital room? 3 - A Little   6. Climbing 3-5 steps with a railing? 2 - A Lot   Basic Mobility Raw Score (Score out of 24.Lower scores equate to lower levels of function) 17   Total Evaluation Time   Total Evaluation Time (Minutes) 25     "

## 2020-06-01 NOTE — H&P
[facility]    History and Physical  Hospitalist       Date of Admission:  6/1/2020    Assessment & Plan   Dustin Pearce is a 89 year old male who presents with recurrent facial droop and slurred speech at home, resolved prior to admission. Admitted for acute on chronic weakness.    Weakness, acute on chronic, generalized  Recurrent facial droop and slurred speech- resolved  Has had similar presentation in the past (see HPI). Slurred speech and facial droop resolved prior to arrival to ED. Weakness present since 5/29 noted per wife at home. He reported being weak since March 2020 discharge from TCU, however during his last admit May 2020, he was weak and it was thought some of this was related to bradycardia noted during that admission. PTA vimpat and keppra resumed. CT head without acute stroke. Neurology called by ED and reported no further stroke work-up  - Admit observation  - Neurology consultation in case of further medication adjustment  - Seizure precautions  - neuro-checks, in case of return of slurred speech or facial droop  - PT/OT consults--may need TCU again, already has home-care    Bradycardia  Observed during 05/2020 admission with HR in upper 30s and low 40s--home metoprolol was held with improvement in rates. Zio patch completed after last admit--patch returned one week ago per wife.  - Telemetry to monitor for further bradycardia  - follow up zio-patch results    Diabetes  PTA Lantus 26 units at bedtime and metformin BID. A1C 8.3 March 2020.  - Moderate consistent carb diet  - Lantus 20 units at bedtime and sliding scale insulin started here  - ACHS BG checks    Coronary artery disease  Ischemic cardiomyopathy  Dyslipidemia  Hypertension  PTA lipitor 40mg, brilinta BID, ASA 81mg daily. Recently discontinued on PTA lisinopril and metoprolol in the past 1 month due to lower BPs and HR  - Resume PTA brilinta and ASA  - Hold PTA lipitor, check total CK in case this is adding to his  weakness    Seizure disorder  PTA vimpat 100mg BID, keppra 750mg BID  - Continued  - Seizure precautions in place    Gastroesophageal reflux disease  PTA prilosec 40mg daily, continued    Paroxysmal atrial fibrillation   PTA not on beta blocker anymore secondary to bradycardia, not on blood thinner either  - Monitor on telemetry    Goals of care  Cognitive impairment  Patient unable to add any information to history, so all history obtained from his wife. POLST from march is full code. Note that Dr Morrissey brought up hospice with patient and his wife on 5/22 via tele-medicine. Wife is adamantly opposed to hospice, but would potentially be interested in palliative care as she believes this would lead to more help at home.  - Attempts to discuss hospice or even [id number] with patient's wife led nowhere.  - Palliative consult    DVT Prophylaxis: Pneumatic Compression Devices  Code Status: Full Code  Expected discharge: 24-48 hours, recommended to TCU vs home with home care pending therapy alida Fontanez, DO    Primary Care Physician   Matt Morrissey    Chief Complaint   Slurred speech/facial droop with weakness    History is obtained from the patient and prior record review    History of Present Illness   Dustin Pearce is a 89 year old male who presents with slurred speech/facial droop and weakness. He has presented with similar episodes previously, 8/2019, 3/2020 and 04/2020. It was thought that these episodes were secondary to seizures and stroke work-up has been negative. In August 2019 he was started on 500mg keppra twice per day. In March 2020 his keppra was increased to 750mg BID, and vimpat 50mg BID was added due to recurrent episodes during that hospitalization. In April 2020 his vimpat was increased to 100mg BID.  Wife reports that patient had been more drowsy on 5/29 and she had asked patient's son and daughter to visit on 5/30. She felt like they over-stayed on Saturday and that they wore  the patient out. So on Sunday when he was again drowsy, sleeping most of the day--she was not surprised and woke him up to eat meals and to use the bathroom. She was nervous he would have recurrent symptoms at home (slurred speech/generalized weakness/facial droop) and she feels these symptoms come on when he is over-tired.   He has had no fevers, chills, change in bowel habits, abdominal or chest pain, shortness of breath or cough.    Past Medical History    I have reviewed this patient's medical history and updated it with pertinent information if needed.   Past Medical History:   Diagnosis Date     Abdominal aortic aneurysm (H) 12/25/2016     Acute on chronic systolic congestive heart failure (H) 6/7/2018     Anemia 6/7/2018     Anemia due to blood loss, acute 6/13/2017     Aortic stenosis     mild     Benign essential hypertension 1/6/2017     Benign non-nodular prostatic hyperplasia with lower urinary tract symptoms 10/20/2016     CAD (coronary artery disease)     MI 1970     Carotid artery stenosis 10/20/2016    chronically occluded left internal carotid artery     Cerebrovascular accident (H) 10/20/2016     Cognitive impairment 6/7/2018     Coronary artery disease of native artery of native heart with stable angina pectoris (H) 10/20/2016    MI 1970     Depression, unspecified depression type 2/22/2018     Diabetes mellitus (H)      Diabetic ulcer of left foot associated with diabetes mellitus due to underlying condition (H) 6/12/2017     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     Gastro-oesophageal reflux disease      Gastroesophageal reflux disease without esophagitis 10/20/2016     Heart attack (H)      Hyperlipidemia      Hyperlipidemia, unspecified hyperlipidemia type 10/20/2016     Ischemic cardiomyopathy      Lumbar back pain 12/30/2016     Mild cognitive impairment 10/20/2016     Nephrolithiasis     RECURRENT     NSTEMI (non-ST elevated myocardial infarction) (H) 6/7/2018      Osteopenia      PAD (peripheral artery disease) (H)     peripheral angiogram 5/2017: The common iliac artery stenoses were stented and the superficial femoral artery stenoses were angioplastied     Paroxysmal atrial fibrillation (H)      Peripheral artery disease (H)     peripheral angiogram 5/2017: The common iliac artery stenoses were stented and the superficial femoral artery stenoses were angioplastied     RBBB with left anterior fascicular block      Slow transit constipation 2/22/2018     Stroke of unknown etiology (H) 12/31/2017     Syncope      TIA (transient ischemic attack) 1/20/2017     Unspecified cerebral artery occlusion with cerebral infarction      UTI (urinary tract infection) due to Enterococcus 2/22/2018     Ventricular tachycardia (H)     nonsustained       Past Surgical History   I have reviewed this patient's surgical history and updated it with pertinent information if needed.  Past Surgical History:   Procedure Laterality Date     AMPUTATE FOOT Left 6/4/2017    Procedure: AMPUTATE FOOT;  Sun Add#3: partial left foot amputation - Sagital Saw - Mini C-Arm;  Surgeon: Moe Kirk DPM;  Location:  OR     CHOLECYSTECTOMY       ENDARTERECTOMY CAROTID Right 1/3/2018    Procedure: ENDARTERECTOMY CAROTID;  RIGHT CAROTID ENDARTERECTOMY WITH EEG;  Surgeon: Roland Rodriguez MD;  Location:  OR     ESOPHAGOSCOPY, GASTROSCOPY, DUODENOSCOPY (EGD), COMBINED N/A 12/26/2016    Procedure: COMBINED ESOPHAGOSCOPY, GASTROSCOPY, DUODENOSCOPY (EGD);  Surgeon: Nasim Louis MD;  Location:  GI     GENITOURINARY SURGERY      KIDNEY STONE REMOVAL X 4     HC UGI ENDOSCOPY W EUS N/A 12/29/2016    Procedure: COMBINED ENDOSCOPIC ULTRASOUND, ESOPHAGOSCOPY, GASTROSCOPY, DUODENOSCOPY (EGD);  Surgeon: Nasim Louis MD;  Location:  GI     HERNIA REPAIR       INCISION AND DRAINAGE FOOT, COMBINED Left 6/6/2017    Procedure: COMBINED INCISION AND DRAINAGE FOOT;  REVISIONAL LEFT FOOT INCISION  AND DRAINAGE WITH POSSIBLE FLAP CLOSURE , ANTIBIOTIC SOLUTION ;  Surgeon: Nabil Ann DPM;  Location: [facility] OR     LASER HOLMIUM LITHOTRIPSY URETER(S), INSERT STENT, COMBINED  3/2/2012    Procedure:COMBINED CYSTOSCOPY, URETEROSCOPY, LASER HOLMIUM LITHOTRIPSY URETER(S), INSERT STENT; CYSTOSCOPY, RIGHT RETROGRADES, RIGHT URETEROSCOPY, HOLMIUM LASER, RIGHT STENT PLACEMENT; Surgeon:ANJELICA LEÓN; Location:[facility] OR     ROTATOR CUFF REPAIR RT/LT         Prior to Admission Medications   Prior to Admission Medications   Prescriptions Last Dose Informant Patient Reported? Taking?   DULoxetine (CYMBALTA) 30 MG capsule 5/31/2020 at am  No Yes   Sig: TAKE 1 CAPSULE(30 MG) BY MOUTH DAILY   Lacosamide (VIMPAT) 100 MG TABS tablet 5/31/2020 at pm  No Yes   Sig: Take 1 tablet (100 mg) by mouth 2 times daily   acetaminophen (TYLENOL) 500 MG tablet 5/31/2020 at pm  Yes Yes   Sig: Take 1,000 mg by mouth 2 times daily Plus 1000 mg daily as needed for pain   aspirin (ASA) 81 MG EC tablet 5/31/2020 at am  No Yes   Sig: Take 1 tablet (81 mg) by mouth daily   atorvastatin (LIPITOR) 40 MG tablet 5/31/2020 at am  No Yes   Sig: Take 1 tablet (40 mg) by mouth daily   insulin glargine (BASAGLAR KWIKPEN) 100 UNIT/ML pen 5/31/2020 at pm  Yes Yes   Sig: Inject 26 Units Subcutaneous At Bedtime   ipratropium (ATROVENT) 0.03 % nasal spray 6/1/2020 at pm Nursing Home No Yes   Sig: INHALE 1 SPRAY IN EACH NOSTRIL EVERY 12 HOURS AS NEEDED   levETIRAcetam (KEPPRA) 750 MG tablet 5/31/2020 at pm  No Yes   Sig: Take 1 tablet (750 mg) by mouth 2 times daily   magnesium oxide (MAGNESIUM-OXIDE) 400 (241.3 Mg) MG tablet 5/31/2020 at pm  No Yes   Sig: Take 1 tablet (400 mg) by mouth 2 times daily   metFORMIN (GLUCOPHAGE) 1000 MG tablet 5/31/2020 at pm  No Yes   Sig: Take 1 tablet (1,000 mg) by mouth 2 times daily (with meals)   nitroGLYcerin (NITROSTAT) 0.4 MG sublingual tablet  at prn Nursing Home No Yes   Sig: For chest pain place 1 tablet under the  "tongue every 5 minutes for 3 doses. If symptoms persist 5 minutes after 1st dose call 911.   omeprazole (PRILOSEC) 40 MG DR capsule 5/31/2020 at am  No Yes   Sig: TAKE 1 CAPSULE(40 MG) BY MOUTH DAILY 30 TO 60 MINUTES BEFORE A MEAL   polyethylene glycol-propylene glycol (SYSTANE ULTRA) 0.4-0.3 % SOLN ophthalmic solution  at prn Nursing Home Yes Yes   Sig: Place 2 drops into both eyes daily as needed for dry eyes   sennosides (SENOKOT) 8.6 MG tablet 5/31/2020 at am Nursing Home Yes Yes   Sig: Take 2 tablets by mouth daily Hold for loose stools   tamsulosin (FLOMAX) 0.4 MG capsule 5/31/2020 at am  No Yes   Sig: Take 1 capsule (0.4 mg) by mouth daily   ticagrelor (BRILINTA) 90 MG tablet 5/31/2020 at pm  No Yes   Sig: Take 1 tablet (90 mg) by mouth 2 times daily      Facility-Administered Medications: None     Allergies   Allergies   Allergen Reactions     Oxycodone Other (See Comments)     \"TERRIBLE SWEATING\"     Sulfa Drugs      Tetracycline        Social History   I have reviewed this patient's social history and updated it with pertinent information if needed. Dustin Pearce  reports that he quit smoking about 21 years ago. His smoking use included cigarettes. He started smoking about 51 years ago. He has a 30.00 pack-year smoking history. He has never used smokeless tobacco. He reports previous alcohol use of about 7.0 standard drinks of alcohol per week. He reports that he does not use drugs.    Family History   I have reviewed this patient's family history and updated it with pertinent information if needed.   Family History   Problem Relation Age of Onset     Breast Cancer Mother      Heart Failure Father 81       Review of Systems   The 10 point Review of Systems is negative other than noted in the HPI    Physical Exam   Temp: 98  F (36.7  C) Temp src: Oral BP: (!) 150/69 Pulse: 77 Heart Rate: 76 Resp: 16 SpO2: 96 % O2 Device: None (Room air)    Vital Signs with Ranges  Temp:  [98  F (36.7  C)] 98  F (36.7 "  C)  Pulse:  [] 77  Heart Rate:  [75-85] 76  Resp:  [10-20] 16  BP: (107-150)/() 150/69  SpO2:  [95 %-99 %] 96 %  150 lbs 0 oz    Constitutional: Awake, alert, cooperative, no apparent distress.  Eyes: Conjunctiva and pupils examined and normal.  HEENT: Moist mucous membranes, normal dentition.  Respiratory: Clear to auscultation bilaterally, no crackles or wheezing.  Cardiovascular: Regular rate and rhythm, normal S1 and S2, and no murmur noted.  GI: Soft, non-distended, non-tender, normal bowel sounds.  Lymph/Hematologic: No anterior cervical or supraclavicular adenopathy.  Skin: No rashes, no cyanosis, no edema.  Musculoskeletal: No joint swelling, erythema or tenderness.  Neurologic: Cranial nerves 2-12 intact, equal strength 5/5 bilateral upper and lower extremities.  Psychiatric: Alert, oriented to person, hospital. Cannot tell me any details about overnight other than he was unable to get-up.    Data   Data reviewed today:  I personally reviewed CT head without acute infarct, does show chronic small vessel ischemic disease and chronic left MCA territory infarct. CT A/P showed no acute process. EKG with PACs and PVCs, sinus rhythm with RBBB.  Recent Labs   Lab 06/01/20  0336   WBC 8.3   HGB 11.7*   MCV 90         POTASSIUM 4.8   CHLORIDE 104   CO2 25   BUN 21   CR 0.71   ANIONGAP 7   ALLISON 9.3   *   ALBUMIN 3.3*   PROTTOTAL 6.8   BILITOTAL 0.6   ALKPHOS 125   ALT 28   AST 36       Recent Results (from the past 24 hour(s))   CT Abdomen Pelvis w Contrast    Narrative    CT ABDOMEN AND PELVIS WITH CONTRAST 6/1/2020 7:22 AM     HISTORY: Weakness, recent diverticulitis.    COMPARISON: May 1, 2020    TECHNIQUE: Volumetric helical acquisition of CT images from the lung  bases through the symphysis pubis after the administration of  75 mL  Isovue-370 intravenous contrast. Radiation dose for this scan was  reduced using automated exposure control, adjustment of the mA and/or  kV according  to patient size, or iterative reconstruction technique.    FINDINGS: There is extensive diverticulosis without evidence for  diverticulitis. There are no dilated loops of small intestine or large  bowel to suggest ileus or obstruction. No abscess. There are extensive  atherosclerotic changes of the visualized aorta and its branches.  There is no evidence of aortic dissection or aneurysm. No free fluid.  No free air. There are no lymph nodes that are abnormal by size  criteria. The liver, spleen, adrenal glands, kidneys, and pancreas  demonstrate no worrisome focal lesion. Left renal cyst. 9 mm  nonobstructing stone inferior left kidney. Lung bases clear of acute  infiltrates. Survey of the visualized bony structures demonstrates no  destructive bony lesions.      Impression    IMPRESSION: No acute process demonstrated in the abdomen and pelvis.  Previously seen inflammatory changes have resolved.     ANJELICA CABRERA MD   Head CT w/o contrast    Narrative    CT OF THE HEAD WITHOUT CONTRAST 6/1/2020 7:22 AM     COMPARISON: Head CT 5/1/2020    HISTORY: Left facial droop, resolved.    TECHNIQUE: 5 mm thick axial CT images of the head were acquired  without IV contrast material.    FINDINGS:  There is moderate diffuse cerebral volume loss. There are  extensive confluent areas of decreased density in the cerebral white  matter bilaterally that are consistent with sequela of chronic small  vessel ischemic disease. A moderate-sized chronic left MCA territory  infarct involving the left frontal lobe and insula again noted.    The ventricles and basal cisterns are within normal limits in  configuration given the degree of cerebral volume loss.  There is no  midline shift. There are no extra-axial fluid collections.    No intracranial hemorrhage, mass or recent infarct.    The visualized paranasal sinuses are well-aerated. There is no  mastoiditis. There are no fractures of the visualized bones.       Impression    IMPRESSION:   Diffuse cerebral volume loss and cerebral white matter  changes consistent with chronic small vessel ischemic disease. Chronic  left MCA territory infarct again noted. No evidence for acute  intracranial pathology.       Radiation dose for this scan was reduced using automated exposure  control, adjustment of the mA and/or kV according to patient size, or  iterative reconstruction technique.    LISANDRO MAGALLON MD

## 2020-06-01 NOTE — ED NOTES
"[facility]  ED Nurse Handoff Report    ED Chief complaint: Neurologic Problem      ED Diagnosis:   Final diagnoses:   Weakness       Code Status: Full Code    Allergies:   Allergies   Allergen Reactions     Oxycodone Other (See Comments)     \"TERRIBLE SWEATING\"     Sulfa Drugs      Tetracycline        Patient Story: poss stroke, had sx at home but gone when got here.  Focused Assessment:  same    Treatments and/or interventions provided:   Medications   sodium chloride 0.9% infusion (has no administration in time range)   Saline Flush (has no administration in time range)   iopamidol (ISOVUE-370) solution 75 mL (has no administration in time range)       Patient's response to treatments and/or interventions: na    To be done/followed up on inpatient unit:  na    Does this patient have any cognitive concerns?: Forgetful    Activity level - Baseline/Home:  Walker  Activity Level - Current:   Stand with Assist and walker    Patient's Preferred language: English   Needed?: No    Isolation: None  Infection: Not Applicable  Bariatric?: No    Vital Signs:   Vitals:    06/01/20 0545 06/01/20 0600 06/01/20 0615 06/01/20 0630   BP: (!) 127/100 118/62 139/66 (!) 149/61   Pulse: 83 78 128 78   Resp: 13 18  13   Temp:       TempSrc:       SpO2:    95%   Weight:       Height:           Cardiac Rhythm:     Was the PSS-3 completed:   Yes  What interventions are required if any?               Family Comments: na  OBS brochure/video discussed/provided to patient/family: No              Name of person given brochure if not patient:               Relationship to patient:     For the majority of the shift this patient's behavior was Green.   Behavioral interventions performed were .    ED NURSE PHONE NUMBER: [phone]         "

## 2020-06-01 NOTE — CONSULTS
[facility]    Neurology Consultation     Date of Admission:  6/1/2020    Assessment & Plan   Dustin Pearce is a 89 year old male who was admitted on 6/1/2020. I was asked to see the patient for possible epilepsy.    DDx Epilepsy (transient and recurrent episode)         -Keppra level- pending  -EEG routine- pending  -On Keppra 750mg BID and Vimpat 100 mg BID (192- GA interval wnl)  -On Brilinta  - Recent MRI brain 4/18/20  1. No evidence for intracranial hemorrhage, acute infarct, or any  focal mass lesions.  2. 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  3. Moderate cerebral artery with moderately extensive brainstem and  white matter signal abnormalities most likely due to chronic small  vessel ischemic disease.   4. A few tiny old right cerebellar infarcts also noted.      Chante Morley     Pager: [phone]    Code Status    Full Code    Reason for Consult   Reason for consult: I was asked by primary team to evaluate this patient for possible epilepsy    Primary Care Physician   Matt Morrissey    Chief Complaint     History of Present Illness     Dustin Pearce is a 89 year old male with a history of HTN, DM, CAD, P afib, Lt MCA infarct,  TIA and seizure ( vimpat and keppra 750 BID) who presents via ambulance for the evaluation of  left sided facial droop and slurred speech.     EMS reports that the patient woke up this morning at approximately 0230 and was found to have left sided facial droop and slurred speech by his wife.     EMS notes that upon their arrival to the scene, both of the symptoms had relieved. The patient's wife states that her  was very drowsy through the day, but was otherwise acting like his normal self per EMS. The patient reports that he feels his normal self just that he is very sleepy.    Per note, facial droop and slurred speech are very recurrent symptoms and admitted multiple times for the same thing and the patient is on Vimpat and Keppra currently.    He was seen by his  neurologist in mid winter and says he has these episodes 2-3 /week.    +stroke  + epilepsy    With respect to risk factors for epilepsy the patient denies any developmental delay, febrile seizure, TBI or meningitis/encephalitis, prior brain surgery, drug/alcohol history or family history of seizure    Living in [facility]  currently taking  Keppra 750mg BID and Vimpat 100 mg BID    Denied any missing dose.- girlfriend helps his medication  Tolerating medication well without side effect.  Last seizure episode: 2-3 times/week  no staring episode, tongue biting, urinary incontinenceor any other seizure episode since .    The last fall 1.5 week ago  Without concussion     no shaking, staring episode, tongue biting or incontinence,   Head trauma, recent illness, sleep deprivation, stress, alcohol/Drug recently.  Denied any missing AED and tolerating well without side effect.    CTH 6/1  Diffuse cerebral volume loss and cerebral white matter  changes consistent with chronic small vessel ischemic disease. Chronic  left MCA territory infarct again noted. No evidence for acute  intracranial pathology.   Na 136   Glu 114  Mg N/A  Ca 9.3  AED level- keppra - pending  UA Neg      Past Medical History    I have reviewed this patient's medical history and updated it with pertinent information if needed.   Past Medical History:   Diagnosis Date     Abdominal aortic aneurysm (H) 12/25/2016     Acute on chronic systolic congestive heart failure (H) 6/7/2018     Anemia 6/7/2018     Anemia due to blood loss, acute 6/13/2017     Aortic stenosis     mild     Benign essential hypertension 1/6/2017     Benign non-nodular prostatic hyperplasia with lower urinary tract symptoms 10/20/2016     CAD (coronary artery disease)     MI 1970     Carotid artery stenosis 10/20/2016    chronically occluded left internal carotid artery     Cerebrovascular accident (H) 10/20/2016     Cognitive impairment 6/7/2018     Coronary artery disease of native artery  of native heart with stable angina pectoris (H) 10/20/2016    MI 1970     Depression, unspecified depression type 2/22/2018     Diabetes mellitus (H)      Diabetic ulcer of left foot associated with diabetes mellitus due to underlying condition (H) 6/12/2017     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     Gastro-oesophageal reflux disease      Gastroesophageal reflux disease without esophagitis 10/20/2016     Heart attack (H)      Hyperlipidemia      Hyperlipidemia, unspecified hyperlipidemia type 10/20/2016     Ischemic cardiomyopathy      Lumbar back pain 12/30/2016     Mild cognitive impairment 10/20/2016     Nephrolithiasis     RECURRENT     NSTEMI (non-ST elevated myocardial infarction) (H) 6/7/2018     Osteopenia      PAD (peripheral artery disease) (H)     peripheral angiogram 5/2017: The common iliac artery stenoses were stented and the superficial femoral artery stenoses were angioplastied     Paroxysmal atrial fibrillation (H)      Peripheral artery disease (H)     peripheral angiogram 5/2017: The common iliac artery stenoses were stented and the superficial femoral artery stenoses were angioplastied     RBBB with left anterior fascicular block      Slow transit constipation 2/22/2018     Stroke of unknown etiology (H) 12/31/2017     Syncope      TIA (transient ischemic attack) 1/20/2017     Unspecified cerebral artery occlusion with cerebral infarction      UTI (urinary tract infection) due to Enterococcus 2/22/2018     Ventricular tachycardia (H)     nonsustained       Past Surgical History   I have reviewed this patient's surgical history and updated it with pertinent information if needed.  Past Surgical History:   Procedure Laterality Date     AMPUTATE FOOT Left 6/4/2017    Procedure: AMPUTATE FOOT;  Sun Add#3: partial left foot amputation - Sagital Saw - Mini C-Arm;  Surgeon: Moe Kirk DPM;  Location: [facility] OR     CHOLECYSTECTOMY       ENDARTERECTOMY CAROTID Right 1/3/2018     Procedure: ENDARTERECTOMY CAROTID;  RIGHT CAROTID ENDARTERECTOMY WITH EEG;  Surgeon: Roland Rodriguez MD;  Location:  OR     ESOPHAGOSCOPY, GASTROSCOPY, DUODENOSCOPY (EGD), COMBINED N/A 12/26/2016    Procedure: COMBINED ESOPHAGOSCOPY, GASTROSCOPY, DUODENOSCOPY (EGD);  Surgeon: Nasim Louis MD;  Location:  GI     GENITOURINARY SURGERY      KIDNEY STONE REMOVAL X 4     [facility] UGI ENDOSCOPY W EUS N/A 12/29/2016    Procedure: COMBINED ENDOSCOPIC ULTRASOUND, ESOPHAGOSCOPY, GASTROSCOPY, DUODENOSCOPY (EGD);  Surgeon: Nasim Louis MD;  Location:  GI     HERNIA REPAIR       INCISION AND DRAINAGE FOOT, COMBINED Left 6/6/2017    Procedure: COMBINED INCISION AND DRAINAGE FOOT;  REVISIONAL LEFT FOOT INCISION AND DRAINAGE WITH POSSIBLE FLAP CLOSURE , ANTIBIOTIC SOLUTION ;  Surgeon: Nabil Ann DPM;  Location:  OR     LASER HOLMIUM LITHOTRIPSY URETER(S), INSERT STENT, COMBINED  3/2/2012    Procedure:COMBINED CYSTOSCOPY, URETEROSCOPY, LASER HOLMIUM LITHOTRIPSY URETER(S), INSERT STENT; CYSTOSCOPY, RIGHT RETROGRADES, RIGHT URETEROSCOPY, HOLMIUM LASER, RIGHT STENT PLACEMENT; Surgeon:ANJELICA LEÓN; Location: OR     ROTATOR CUFF REPAIR RT/LT         Prior to Admission Medications   Prior to Admission Medications   Prescriptions Last Dose Informant Patient Reported? Taking?   DULoxetine (CYMBALTA) 30 MG capsule 5/31/2020 at am  No Yes   Sig: TAKE 1 CAPSULE(30 MG) BY MOUTH DAILY   Lacosamide (VIMPAT) 100 MG TABS tablet 5/31/2020 at pm  No Yes   Sig: Take 1 tablet (100 mg) by mouth 2 times daily   acetaminophen (TYLENOL) 500 MG tablet 5/31/2020 at pm  Yes Yes   Sig: Take 1,000 mg by mouth 2 times daily Plus 1000 mg daily as needed for pain   aspirin (ASA) 81 MG EC tablet 5/31/2020 at am  No Yes   Sig: Take 1 tablet (81 mg) by mouth daily   atorvastatin (LIPITOR) 40 MG tablet 5/31/2020 at am  No Yes   Sig: Take 1 tablet (40 mg) by mouth daily   insulin glargine (BASAGLAR KWIKPEN) 100  "UNIT/ML pen 5/31/2020 at pm  Yes Yes   Sig: Inject 26 Units Subcutaneous At Bedtime   ipratropium (ATROVENT) 0.03 % nasal spray 6/1/2020 at pm Nursing Home No Yes   Sig: INHALE 1 SPRAY IN EACH NOSTRIL EVERY 12 HOURS AS NEEDED   levETIRAcetam (KEPPRA) 750 MG tablet 5/31/2020 at pm  No Yes   Sig: Take 1 tablet (750 mg) by mouth 2 times daily   magnesium oxide (MAGNESIUM-OXIDE) 400 (241.3 Mg) MG tablet 5/31/2020 at pm  No Yes   Sig: Take 1 tablet (400 mg) by mouth 2 times daily   metFORMIN (GLUCOPHAGE) 1000 MG tablet 5/31/2020 at pm  No Yes   Sig: Take 1 tablet (1,000 mg) by mouth 2 times daily (with meals)   nitroGLYcerin (NITROSTAT) 0.4 MG sublingual tablet  at prn Nursing Home No Yes   Sig: For chest pain place 1 tablet under the tongue every 5 minutes for 3 doses. If symptoms persist 5 minutes after 1st dose call 911.   omeprazole (PRILOSEC) 40 MG DR capsule 5/31/2020 at am  No Yes   Sig: TAKE 1 CAPSULE(40 MG) BY MOUTH DAILY 30 TO 60 MINUTES BEFORE A MEAL   polyethylene glycol-propylene glycol (SYSTANE ULTRA) 0.4-0.3 % SOLN ophthalmic solution  at prn Nursing Home Yes Yes   Sig: Place 2 drops into both eyes daily as needed for dry eyes   sennosides (SENOKOT) 8.6 MG tablet 5/31/2020 at am Nursing Home Yes Yes   Sig: Take 2 tablets by mouth daily Hold for loose stools   tamsulosin (FLOMAX) 0.4 MG capsule 5/31/2020 at am  No Yes   Sig: Take 1 capsule (0.4 mg) by mouth daily   ticagrelor (BRILINTA) 90 MG tablet 5/31/2020 at pm  No Yes   Sig: Take 1 tablet (90 mg) by mouth 2 times daily      Facility-Administered Medications: None     Allergies   Allergies   Allergen Reactions     Oxycodone Other (See Comments)     \"TERRIBLE SWEATING\"     Sulfa Drugs      Tetracycline        Social History   I have reviewed this patient's social history and updated it with pertinent information if needed. Dustin Pearce  reports that he quit smoking about 21 years ago. His smoking use included cigarettes. He started smoking about 51 " years ago. He has a 30.00 pack-year smoking history. He has never used smokeless tobacco. He reports previous alcohol use of about 7.0 standard drinks of alcohol per week. He reports that he does not use drugs.    Family History   I have reviewed this patient's family history and updated it with pertinent information if needed.   Family History   Problem Relation Age of Onset     Breast Cancer Mother      Heart Failure Father 81       Review of Systems   The 10 point Review of Systems is negative other than noted in the HPI or here.     Patient denies any LOC, HA, vision change (double vision/blurry vision/ vision loss), dizziness, imbalance, nausea, vomiting, dysphagia, dysarthria, weakness, numbness, tingling, incontinence, saddle anesthesia, prior trauma, brain surgery, weight change, recent illness, bleeding, bruising, fever, diarrhea, chest pain or shortness breath    Physical Exam   Temp: 97.6  F (36.4  C) Temp src: Oral BP: 135/63 Pulse: 77 Heart Rate: 77 Resp: 16 SpO2: 97 % O2 Device: None (Room air)    Vital Signs with Ranges  Temp:  [97.6  F (36.4  C)-98  F (36.7  C)] 97.6  F (36.4  C)  Pulse:  [] 77  Heart Rate:  [75-85] 77  Resp:  [10-20] 16  BP: (107-150)/() 135/63  SpO2:  [95 %-99 %] 97 %  150 lbs 0 oz       Limited exam in the context of telemedicine and patient not fully participate    General appearance:No acute distress   Neuro/Psych:Awake, alert, oriented *2   To place  person only.   Cranial nerves: Grossly II-XII intact   Fundi/Optic disc: Not available  Extraocular movements intact. No nystagmus, Face appears symmetric. Facial sensation intact. Hearing intact to finger rub (cannot assess).   Motor strength: Upper/Lower  extremities at least 3/5   Range of motion: Partially limited    Sensory: symmetric response to LT stimuli in both upper and lower extremities   reflexes: Not available  Babinski/Clonus/Corrigan: Not available  Coordination: Not available  Romberg  not available.    Gait: Not available.         Data   Results for orders placed or performed during the hospital encounter of 06/01/20 (from the past 24 hour(s))   Glucose by meter   Result Value Ref Range    Glucose 114 (H) 70 - 99 mg/dL   EKG 12 lead   Result Value Ref Range    Interpretation ECG Click View Image link to view waveform and result    CBC with platelets differential   Result Value Ref Range    WBC 8.3 4.0 - 11.0 10e9/L    RBC Count 4.09 (L) 4.4 - 5.9 10e12/L    Hemoglobin 11.7 (L) 13.3 - 17.7 g/dL    Hematocrit 36.6 (L) 40.0 - 53.0 %    MCV 90 78 - 100 fl    MCH 28.6 26.5 - 33.0 pg    MCHC 32.0 31.5 - 36.5 g/dL    RDW 15.5 (H) 10.0 - 15.0 %    Platelet Count 226 150 - 450 10e9/L    Diff Method Automated Method     % Neutrophils 68.4 %    % Lymphocytes 23.3 %    % Monocytes 7.7 %    % Eosinophils 0.2 %    % Basophils 0.2 %    % Immature Granulocytes 0.2 %    Nucleated RBCs 0 0 /100    Absolute Neutrophil 5.7 1.6 - 8.3 10e9/L    Absolute Lymphocytes 1.9 0.8 - 5.3 10e9/L    Absolute Monocytes 0.6 0.0 - 1.3 10e9/L    Absolute Eosinophils 0.0 0.0 - 0.7 10e9/L    Absolute Basophils 0.0 0.0 - 0.2 10e9/L    Abs Immature Granulocytes 0.0 0 - 0.4 10e9/L    Absolute Nucleated RBC 0.0    Comprehensive metabolic panel   Result Value Ref Range    Sodium 136 133 - 144 mmol/L    Potassium 4.8 3.4 - 5.3 mmol/L    Chloride 104 94 - 109 mmol/L    Carbon Dioxide 25 20 - 32 mmol/L    Anion Gap 7 3 - 14 mmol/L    Glucose 110 (H) 70 - 99 mg/dL    Urea Nitrogen 21 7 - 30 mg/dL    Creatinine 0.71 0.66 - 1.25 mg/dL    GFR Estimate 81 >60 mL/min/[1.73_m2]    GFR Estimate If Black >90 >60 mL/min/[1.73_m2]    Calcium 9.3 8.5 - 10.1 mg/dL    Bilirubin Total 0.6 0.2 - 1.3 mg/dL    Albumin 3.3 (L) 3.4 - 5.0 g/dL    Protein Total 6.8 6.8 - 8.8 g/dL    Alkaline Phosphatase 125 40 - 150 U/L    ALT 28 0 - 70 U/L    AST 36 0 - 45 U/L   CK total   Result Value Ref Range    CK Total 93 30 - 300 U/L   UA with Microscopic   Result Value Ref Range    Color  Urine Yellow     Appearance Urine Clear     Glucose Urine Negative NEG^Negative mg/dL    Bilirubin Urine Negative NEG^Negative    Ketones Urine 5 (A) NEG^Negative mg/dL    Specific Gravity Urine 1.024 1.003 - 1.035    Blood Urine Small (A) NEG^Negative    pH Urine 5.5 5.0 - 7.0 pH    Protein Albumin Urine 10 (A) NEG^Negative mg/dL    Urobilinogen mg/dL Normal 0.0 - 2.0 mg/dL    Nitrite Urine Negative NEG^Negative    Leukocyte Esterase Urine Negative NEG^Negative    Source Catheterized Urine     WBC Urine 0 0 - 5 /HPF    RBC Urine 22 (H) 0 - 2 /HPF    Squamous Epithelial /HPF Urine <1 0 - 1 /HPF    Mucous Urine Present (A) NEG^Negative /LPF   CT Abdomen Pelvis w Contrast    Narrative    CT ABDOMEN AND PELVIS WITH CONTRAST 6/1/2020 7:22 AM     HISTORY: Weakness, recent diverticulitis.    COMPARISON: May 1, 2020    TECHNIQUE: Volumetric helical acquisition of CT images from the lung  bases through the symphysis pubis after the administration of  75 mL  Isovue-370 intravenous contrast. Radiation dose for this scan was  reduced using automated exposure control, adjustment of the mA and/or  kV according to patient size, or iterative reconstruction technique.    FINDINGS: There is extensive diverticulosis without evidence for  diverticulitis. There are no dilated loops of small intestine or large  bowel to suggest ileus or obstruction. No abscess. There are extensive  atherosclerotic changes of the visualized aorta and its branches.  There is no evidence of aortic dissection or aneurysm. No free fluid.  No free air. There are no lymph nodes that are abnormal by size  criteria. The liver, spleen, adrenal glands, kidneys, and pancreas  demonstrate no worrisome focal lesion. Left renal cyst. 9 mm  nonobstructing stone inferior left kidney. Lung bases clear of acute  infiltrates. Survey of the visualized bony structures demonstrates no  destructive bony lesions.      Impression    IMPRESSION: No acute process demonstrated in  the abdomen and pelvis.  Previously seen inflammatory changes have resolved.     ANJELICA CABRERA MD   Head CT w/o contrast    Narrative    CT OF THE HEAD WITHOUT CONTRAST 6/1/2020 7:22 AM     COMPARISON: Head CT 5/1/2020    HISTORY: Left facial droop, resolved.    TECHNIQUE: 5 mm thick axial CT images of the head were acquired  without IV contrast material.    FINDINGS:  There is moderate diffuse cerebral volume loss. There are  extensive confluent areas of decreased density in the cerebral white  matter bilaterally that are consistent with sequela of chronic small  vessel ischemic disease. A moderate-sized chronic left MCA territory  infarct involving the left frontal lobe and insula again noted.    The ventricles and basal cisterns are within normal limits in  configuration given the degree of cerebral volume loss.  There is no  midline shift. There are no extra-axial fluid collections.    No intracranial hemorrhage, mass or recent infarct.    The visualized paranasal sinuses are well-aerated. There is no  mastoiditis. There are no fractures of the visualized bones.       Impression    IMPRESSION:  Diffuse cerebral volume loss and cerebral white matter  changes consistent with chronic small vessel ischemic disease. Chronic  left MCA territory infarct again noted. No evidence for acute  intracranial pathology.       Radiation dose for this scan was reduced using automated exposure  control, adjustment of the mA and/or kV according to patient size, or  iterative reconstruction technique.    LISANDRO MAGALLON MD   Glucose by meter   Result Value Ref Range    Glucose 97 70 - 99 mg/dL       This is a telemedicine visit that was performed with the originating site at [facility] and the distant site at provider s home in [place], [place]. Verbal consent was given to participate in video visit using [employer] real-time telemedicine video conference.  This visit occurred during the Coronavirus (COVID-19) Public Health Emergency  and was requested by patient due to contact concerns.     I discussed with the patient the nature of our telemedicine visits, that:      I would evaluate the patient and recommend diagnostics and treatments based on my assessment and the exam is limited due to the nature of telemedicine visit.    Our sessions are not being recorded and that personal health information is protected    Our team would provide followup care in person if/when the patient needs it.     Patient identification was verified before the start of the encounter.      total time : 70 minutes  More than 50% of the time was spent on counseling on the Pathophysiology and differential diagnosis of the  Epilepsy vs syncope,the importance of EEG and other Risk factor management as well as discussing/coordinating care with hospital staffs.

## 2020-06-01 NOTE — PLAN OF CARE
RECEIVING UNIT ED HANDOFF REVIEW    ED Nurse Handoff Report was reviewed by: Pako Freire RN on June 1, 2020 at 8:57 AM

## 2020-06-01 NOTE — CONSULTS
[facility]  Palliative Care Consultation Note    Patient: Dustin Pearce  Date of Admission:  6/1/2020    Requesting Clinician / Team: Dr. Fontanez/Hospitalist  Reason for consult: Goals of care, Patient and family support    Recommendations:    Per naomi delgadillo's request, goals of care remain restorative    Pending PT/OT assessment, wife will be agreeable to recommendation for home with home care vs TCU (although he didn't do very well at TCU, frequent falls, earlier this spring)    Once pt returns home, palliative home care may be worth exploring    Pt's naomi delgadillo is adamantly opposed to hospice, due to negative experiences with family members' dying processes in the past. She fundamentally does not believe in the act of the dying process. I encouraged her to think more from the perspective of what Dustin may want for himself     Naomi delgadillo does ultimately understand that pt's repeated hospitalizations are generally not a good sign and that this may mean a shift is occurring at end of life. She recognizes that Dustin is in his 90s and when it is his time to die, she will understand and be prepared. She doesn't want anything to be done to hasten his dying process     I did explore code status with naomi Meade and encouraged her to explore this more with pt and pt's son Leonidas once pt is back home     Pt does not have an advanced directive. Naomi delgadillo, Halina, and Dustin are not legally . I did point out that son Leonidas ultimately becomes the surrogate decision maker if Dustin cannot make decisions independently. Per Halina, she works closely with son Leonidas and understands in the absence of a form, he would be the legal decision maker. To avoid this confusion, I did recommend a health care directive if Dustin reaches a point where he can name a surrogate decision maker     These recommendations have been discussed with Dr. Fontanez.    Lluvia LOUISE, [facility]  Palliative Medicine   Pager [phone]      Thank you  for the opportunity to participate in the care of this patient and family. Our team: will continue to follow.     During regular M-F work hours -- if you are not sure who specifically to contact -- please contact us at [phone].    After regular work hours and on weekends/holidays, you can call our answering service at [phone]. Also, who's on call for us is available in [employer].     Attestation:  Total time on the floor involved in the patient's care: 70 minutes  Total time spent in counseling/care coordination: >50%    Assessments:  Dustin Pearce is a 89 year old male with PMH significant for HTN, HLD, CAD, PAD, paroxysmal a.fib, hx large CVA 1999, cognitive impairment, depression, DM, GERD, and recurrent hospitalizations this spring for slurred speech, facial droop and weakness thought to be 2/2 seizure disorder, recently started on antiepileptic therapy who presents with recurrent slurred speech, facial droop, and weakness. CT Head without acute pathology. Pt to be evaluated by Neuro.     Today, the patient was seen for:  Goals of care     Visited Dustin this AM. He is seen sleeping in bed, full breakfast on tray untouched. Pt awakes to verbal cue. He is disoriented, slow to respond, flat, disengaged. He understands he is in the hospital, but is not sure why.    Spoke with sig other, Halina, of 40 years via phone. She reports that Dustin's QOL and function is actually quite good when he is not in one of these spells or hospitalizations. He walks with a walker. She is his caretaker, but he is able to make his needs known. His appetite has lessened overtime, but he is eating ok. He did have some [facility] loss [facility] this spring at [facility]. While at [facility] this spring, he didn't do very well and fell several times. She believes he is safer at home.    Halina thinks these recurrent spells of slurred speech and weakness are precipitated by being overtired. Apparently they had a visitor over and Dustin  "overextended himself, which is what led to this episode.    Halina feels that after the episode wears off, Dustin really completely bounces back to normal.    She was very receptive to me commenting on the frequency of hospitalizations and the concern I have that something is shifting in Dustin's body which may ultimately lead to a greater, perhaps gradual, decline overtime. She recognizes he is elderly and will in fact die at some point, but she does not necessarily believe this is his dying process.    Halina is very vocal in sharing her strong dislike for hospice. She has watched several family members and friends die with hospice. She fundamentally does not believe in allowing the dying process to occur. She names that many people feel this is a natural process, and she simply does not. I encouraged her to think about end of life cares from Dustin's perspective, to try to leave her wishes and bias aside, but I think that will be too difficult for her to do, given her very strong negative feelings about hospice.    She is interested in possibly palliative home care. I think that is fine. It won't necessarily stop the repeated hospitalizations, but could be a layer of additional support. We acknowledge that that program is separate from my program. I do not think our outpatient palliative program will be of any benefit to them, from what I can tell.    Halina and Dustin have not had a lot of conversations focused around what Dustin would want his care to look like during future hospitalizations/declining health/end of life. She notes that Dustin has \"always liked life.\" I encourage her to try to have future conversations with Dustin about how he would like to be cared for in the future, if/when he truly is declining. Specifically conversations about ICU care, code status, etc.    We talk about an advanced directive. They are not legally . Technically son Leonidas would be the default decision maker. They all work closely " together and she does not foresee this being an issue, and understands what I am saying about the form, if they want to make it more simplified and clear.    Prognosis, Goals, & Planning:      Functional Status just prior to hospitalization: 3 (Capable of only limited self-care; needs help with ADLs; in bed/chair >50% of waking hours)      Prognosis, Goals, and/or Advance Care Planning were addressed today: No      Patient's decision making preferences: unable to assess          Patient has decision-making capacity today for complex decisions: No            I have concerns about the patient/family's health literacy today: No           Patient has a completed Health Care Directive: No.       Code status: full code    Coping, Meaning, & Spirituality:   Mood, coping, and/or meaning in the context of serious illness were addressed today: Yes  Summary/Comments: Per sig other, pt's QOL and function is good, despite these recurrent setbacks and hospitalizations. We discussed sig other's strong negative feelings toward hospice.     Social:     Living situation: Lives and cared for by sig other Halina, of 40 years    [place] family / caregivers: Sig elie Meade of 40 years. Son Leonidas lives in [place] and involved     History of Present Illness:  History gathered today from: family/loved ones, medical chart, medical team members, unit team members    Dustin Pearce is a 89 year old male with PMH significant for HTN, HLD, CAD, PAD, paroxysmal a.fib, hx large CVA 1999, cognitive impairment, depression, DM, GERD, and recurrent hospitalizations this spring for slurred speech, facial droop and weakness thought to be 2/2 seizure disorder, recently started on antiepileptic therapy who presents with recurrent slurred speech, facial droop, and weakness. CT Head without acute pathology. Pt to be evaluated by Neuro.     Key Palliative Symptom Data:  # Pain severity the last 12 hours: none  # Dyspnea severity the last 12 hours: none  #  Nausea severity the last 12 hours: none  # Anxiety severity the last 12 hours: none    ROS:  Comprehensive ROS is reviewed and is negative except as here & per HPI: N/A     Past Medical History:  Past Medical History:   Diagnosis Date     Abdominal aortic aneurysm (H) 12/25/2016     Acute on chronic systolic congestive heart failure (H) 6/7/2018     Anemia 6/7/2018     Anemia due to blood loss, acute 6/13/2017     Aortic stenosis     mild     Benign essential hypertension 1/6/2017     Benign non-nodular prostatic hyperplasia with lower urinary tract symptoms 10/20/2016     CAD (coronary artery disease)     MI 1970     Carotid artery stenosis 10/20/2016    chronically occluded left internal carotid artery     Cerebrovascular accident (H) 10/20/2016     Cognitive impairment 6/7/2018     Coronary artery disease of native artery of native heart with stable angina pectoris (H) 10/20/2016    MI 1970     Depression, unspecified depression type 2/22/2018     Diabetes mellitus (H)      Diabetic ulcer of left foot associated with diabetes mellitus due to underlying condition (H) 6/12/2017     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     Gastro-oesophageal reflux disease      Gastroesophageal reflux disease without esophagitis 10/20/2016     Heart attack (H)      Hyperlipidemia      Hyperlipidemia, unspecified hyperlipidemia type 10/20/2016     Ischemic cardiomyopathy      Lumbar back pain 12/30/2016     Mild cognitive impairment 10/20/2016     Nephrolithiasis     RECURRENT     NSTEMI (non-ST elevated myocardial infarction) (H) 6/7/2018     Osteopenia      PAD (peripheral artery disease) (H)     peripheral angiogram 5/2017: The common iliac artery stenoses were stented and the superficial femoral artery stenoses were angioplastied     Paroxysmal atrial fibrillation (H)      Peripheral artery disease (H)     peripheral angiogram 5/2017: The common iliac artery stenoses were stented and the superficial femoral  artery stenoses were angioplastied     RBBB with left anterior fascicular block      Slow transit constipation 2/22/2018     Stroke of unknown etiology (H) 12/31/2017     Syncope      TIA (transient ischemic attack) 1/20/2017     Unspecified cerebral artery occlusion with cerebral infarction      UTI (urinary tract infection) due to Enterococcus 2/22/2018     Ventricular tachycardia (H)     nonsustained        Past Surgical History:  Past Surgical History:   Procedure Laterality Date     AMPUTATE FOOT Left 6/4/2017    Procedure: AMPUTATE FOOT;  Sun Add#3: partial left foot amputation - Sagital Saw - Mini C-Arm;  Surgeon: Moe Kirk DPM;  Location:  OR     CHOLECYSTECTOMY       ENDARTERECTOMY CAROTID Right 1/3/2018    Procedure: ENDARTERECTOMY CAROTID;  RIGHT CAROTID ENDARTERECTOMY WITH EEG;  Surgeon: Roland Rodriguez MD;  Location:  OR     ESOPHAGOSCOPY, GASTROSCOPY, DUODENOSCOPY (EGD), COMBINED N/A 12/26/2016    Procedure: COMBINED ESOPHAGOSCOPY, GASTROSCOPY, DUODENOSCOPY (EGD);  Surgeon: Nasim Louis MD;  Location:  GI     GENITOURINARY SURGERY      KIDNEY STONE REMOVAL X 4     HC UGI ENDOSCOPY W EUS N/A 12/29/2016    Procedure: COMBINED ENDOSCOPIC ULTRASOUND, ESOPHAGOSCOPY, GASTROSCOPY, DUODENOSCOPY (EGD);  Surgeon: Nasim Louis MD;  Location:  GI     HERNIA REPAIR       INCISION AND DRAINAGE FOOT, COMBINED Left 6/6/2017    Procedure: COMBINED INCISION AND DRAINAGE FOOT;  REVISIONAL LEFT FOOT INCISION AND DRAINAGE WITH POSSIBLE FLAP CLOSURE , ANTIBIOTIC SOLUTION ;  Surgeon: Nabil Ann DPM;  Location:  OR     LASER HOLMIUM LITHOTRIPSY URETER(S), INSERT STENT, COMBINED  3/2/2012    Procedure:COMBINED CYSTOSCOPY, URETEROSCOPY, LASER HOLMIUM LITHOTRIPSY URETER(S), INSERT STENT; CYSTOSCOPY, RIGHT RETROGRADES, RIGHT URETEROSCOPY, HOLMIUM LASER, RIGHT STENT PLACEMENT; Surgeon:ANJELICA LEÓN; Location: OR     ROTATOR CUFF REPAIR RT/LT           Family  "History:  Family History   Problem Relation Age of Onset     Breast Cancer Mother      Heart Failure Father 81         Allergies:  Allergies   Allergen Reactions     Oxycodone Other (See Comments)     \"TERRIBLE SWEATING\"     Sulfa Drugs      Tetracycline         Medications:  I have reviewed this patient's medication profile and medications from this hospitalization.   Noted scheduled meds are:  APAP 1g PO BID  Vimpat 100mg PO BID  Keppra 750mg PO BID    Noted PRN meds are:  N/A    Physical Exam:  Vital Signs: Temp: 97.6  F (36.4  C) Temp src: Oral BP: 135/63 Pulse: 77 Heart Rate: 77 Resp: 16 SpO2: 97 % O2 Device: None (Room air)    Weight: 150 lbs 0 oz  CONSTITUTIONAL: Chronically ill elderly man seen lying in bed in NAD, lethargic, does awaken to verbal cue, slow verbal response, flat, disengaged, disoriented. He is calm and cooperative.  RESPIRATORY: NL respiratory effort on RA    Data reviewed:  Recent imaging reviewed, my comments on pertinents:   6/1 CT Abd No acute process demonstrated in the abdomen and pelvis.     6/1 CT Head Diffuse cerebral volume loss and cerebral white matter changes consistent with chronic small vessel ischemic disease. Chronic left MCA territory infarct again noted. No evidence for acute intracranial pathology.     Recent lab data reviewed, my comments on pertinents:   Na 136  K 4.8  Creat 0.71  Albumin 3.3  WBC 8.3  Hgb 11.7  Plt 226              "

## 2020-06-01 NOTE — PLAN OF CARE
Alert and mostly oriented, seems a bit forgetful. VSS. Denies pain. Up with one and walker. Neuros intact. Tele SR with pvc's and bbb. Blood glucose 97, 180, 102. Plan to continue to monitor likely to discharge to TCU when able. EEG result pending.

## 2020-06-01 NOTE — ED PROVIDER NOTES
History     Chief Complaint:  Neurologic Problem      HPI   Dustin Pearce is a 89 year old male with a history of TIA who presents via ambulance for the evaluation of neurologic problem. EMS reports that the patient woke up this morning at approximately 0230 and was found to have left sided facial droop and slurred speech by his wife. EMS notes that upon their arrival to the scene, both of the symptoms had relieved. The patient's wife states that her  was very drowsy through the day, but was otherwise acting like his normal self per EMS. The patient reports that he feels his normal self just that he is very sleepy. The patient denies nausea, cough, shortness of breath, any pain, appetite change, dysuria, hematuria, a change in frequency or urgency of urination, diarrhea, and other issues.      Allergies:  Oxycodone  Sulfa drugs  Tetracycline      Medications:    Lipitor  Cymbalta  Vimpat  Keppra  Metformin  Nitrostat  Prilosec  Senokot  Brilinta     Past Medical History:    Altered mental status  Anemia   Aortic stenosis  Hypertension   Coronary artery disease  Diabetes type 2    Diverticulitis  UTI  Slow transit constipation  GERD  Carotid artery stenosis  Cognitive impairment  Depression   Gastro-oesophageal reflux disease  Heart attack  Hyperlipidemia   Ischemic cardiomyopathy  Nephrolithiasis  NSTEMI   Mild cognitive impairment   PAD  Paroxysmal atrial fibrillation  Stroke  Ventricular tachycardia   Syncope  TIA     Past Surgical History:    Amputate left foot  Cholecystectomy   Endarterectomy Cartoid   EGD Combined  Genitourinary surgery - kidney stone removal x 4  HC UGI endoscopy with endoscopic US   Hernia repair   I&D foot combined - left   Laser holmium lithotripsy ureters, insert stent combined   Rotator cuff repair      Family History:    Mother: Breast cancer  Father: heart failure     Social History:  Smoking status: Former. Quit date: 1999  Alcohol use: No  Drug use: No  The patient presents  "to the emergency department via EMS  PCP: Matt Morrissey  Marital Status:   [4]    Review of Systems   Constitutional: Negative for appetite change.   Respiratory: Negative for cough and shortness of breath.    Cardiovascular: Negative for chest pain.   Gastrointestinal: Negative for diarrhea and nausea.   Genitourinary: Negative for dysuria, frequency, hematuria and urgency.   Musculoskeletal: Negative for arthralgias and myalgias.   Neurological: Positive for facial asymmetry and speech difficulty.   All other systems reviewed and are negative.        Physical Exam     Patient Vitals for the past 24 hrs:   BP Temp Temp src Pulse Heart Rate Resp SpO2 Height Weight   06/01/20 0630 (!) 149/61 -- -- 78 77 13 95 % -- --   06/01/20 0615 139/66 -- -- 128 -- -- -- -- --   06/01/20 0600 118/62 -- -- 78 75 18 -- -- --   06/01/20 0545 (!) 127/100 -- -- 83 85 13 -- -- --   06/01/20 0530 118/67 -- -- 81 77 20 -- -- --   06/01/20 0515 107/60 -- -- 86 80 20 -- -- --   06/01/20 0500 117/58 -- -- 85 80 -- -- -- --   06/01/20 0445 122/64 -- -- 82 80 10 -- -- --   06/01/20 0430 (!) 118/99 -- -- 80 81 -- 98 % -- --   06/01/20 0415 129/62 -- -- 81 79 17 98 % -- --   06/01/20 0400 116/68 -- -- 79 79 10 99 % -- --   06/01/20 0345 127/59 -- -- -- 76 11 99 % -- --   06/01/20 0330 -- -- -- -- 82 14 99 % -- --   06/01/20 0318 138/70 98  F (36.7  C) Oral -- 85 14 98 % 1.702 m (5' 7\") 68 kg (150 lb)   06/01/20 0315 138/70 -- -- 85 -- -- 98 % -- --      Physical Exam  General: Appears well-developed and well-nourished.   Head: No signs of trauma.   Mouth/Throat: Oropharynx is clear and moist.   Eyes: Conjunctivae are normal. Pupils are equal, round, and reactive to light.   Neck: Normal range of motion. No nuchal rigidity. No cervical adenopathy  CV: Normal rate and regular rhythm.    Resp: Effort normal and breath sounds normal. No respiratory distress.   GI: Soft. There is no tenderness.  No rebound or guarding.  Normal bowel " sounds.  No CVA tenderness.  MSK: Normal range of motion. no edema. No Calf tenderness.  Neuro: The patient is alert and oriented to person, place, and time.  PERRLA, EOMI, strength in upper/lower extremities normal and symmetrical. Sensation normal. Speech normal.  GCS 15  Skin: Skin is warm and dry. No rash noted.   Psych: normal mood and affect. behavior is normal.       Emergency Department Course   ECG (03:28:11):  Rate 82 bpm. AZ interval 192. QRS duration 134. QT/QTc 410/479. P-R-T axes 79 -80 76. Sinus rhythm with occasional premature ventricular complexes and premature atrial complexes. Left axis deviation. Right bundle branch block. Abnormal ECG. No significant change compared to ECG dated 5/1/2020. Interpreted at 0334 by Anjelica Dunham MD.     Imaging:  Radiographic findings were communicated with the patient who voiced understanding of the findings.   Head CT w/o contrast   Final Result   IMPRESSION:  Diffuse cerebral volume loss and cerebral white matter   changes consistent with chronic small vessel ischemic disease. Chronic   left MCA territory infarct again noted. No evidence for acute   intracranial pathology.          Radiation dose for this scan was reduced using automated exposure   control, adjustment of the mA and/or kV according to patient size, or   iterative reconstruction technique.      LISANDRO MAGALLON MD      CT Abdomen Pelvis w Contrast   Final Result   IMPRESSION: No acute process demonstrated in the abdomen and pelvis.   Previously seen inflammatory changes have resolved.       ANJELICA CABRERA MD      MR Brain w/o & w Contrast    (Results Pending)         Laboratory:  Glucose by meter: 114 (H)  CBC: HGB 11.7 (L), WNL (WBC 8.3, )  CMP: Glucose 110 (H), WNL (Creatinine 0.71)  Keppra (Levetiracetam) Level: Pending    UA: Urineketon 5, Bloo Small, RBC 22 (H), Mucous Present, o/w Negative     Interventions:  Medications   sodium chloride 0.9% infusion (has no administration in time  range)       Emergency Department Course:  Past medical records, nursing notes, and vitals reviewed.  0336: I performed an exam of the patient and obtained history, as documented above.     IV inserted and blood drawn.     0347: I spoke with the patient's wife over the phone.     0403: I spoke with the neurosurgery fellow    The patient was sent for an abdomen pelvis and head CT while in the emergency department, findings above.         Findings and plan explained to the Patient who consents to admission. Discussed the patient with Dr. Fontanez, who will admit the patient to a medical bed for further monitoring, evaluation, and treatment.     Impression & Plan    Medical Decision Making:  Dustin Pearce is a 89-year-old gentleman who presents due to weakness.  His significant other had noted that he had facial droop and slurred speech which apparently are very recurrent symptoms for him.  He has been admitted multiple times for the same thing and it seems neurology thinks this may be more seizure related the patient is on Vimpat and Keppra.  At the time of my evaluation his symptoms are completely resolved.  Blood work was obtained that was non-concerning and UA did not show any signs of infection.  I did speak with neurology who felt that further stroke work-up was not necessary considering and has had repeated work-ups for this same presentation.  I tried to have the patient ambulate but he is seen quite unsteady on his feet.  I did order a CT scan of the abdomen as he had recently been diagnosed with diverticulitis.  Patient will be admitted for hydration and monitoring and discussion of care going forward.  I did speak with the pt's significant other.  She does report interest in hospice information.    Diagnosis:    ICD-10-CM    1. Weakness  R53.1        Disposition:  Admitted to a medical bed.     Scribe Disclosure:  I, Khoa Chapman, am serving as a scribe at 3:36 AM on 6/1/2020 to document services personally  performed by Wilmar Dunham MD based on my observations and the provider's statements to me.      Khoa Chapmna  6/1/2020    EMERGENCY DEPARTMENT       Wilmar Dunham MD  06/03/20 0308

## 2020-06-01 NOTE — PLAN OF CARE
Discharge Planner OT   Patient plan for discharge: see chart   Current status: IP OT orders received, chart reviewed, and discussed with IP PT. Pt under Observation status. Discharge recommendation is for TCU. All therapy needs are being met with IP PT. Will defer OT to next level of care and will cancel/complete IP OT orders/evaluation.   Barriers to return to prior living situation: defer to IP PT   Recommendations for discharge: defer to IP PT   Rationale for recommendations: Will defer OT to next level of care and will cancel/complete IP OT orders/evaluation.        Entered by: Fadia Olivares 06/01/2020 4:33 PM

## 2020-06-01 NOTE — ED TRIAGE NOTES
EMS arrival:    Wife states he went to bed normal.  A little tired today.  Everything normal.  Woke up at 0230, slurred speech, initially left facial droop.  Slurred speech subsided & facial droop resolved.  Weak walking down steps.    Increased weakness since 3/15 (discharged from TCU).  Hx of seizure disorder which appears like TIAs.  Hx of diabetes.    Blood glucose 102.  Right bundle.  Hx of atrial fib.

## 2020-06-01 NOTE — ED NOTES
Bed: ED24  Expected date: 6/1/20  Expected time: 3:03 AM  Means of arrival: Ambulance  Comments:  Redd [phone] TIA

## 2020-06-02 NOTE — PROVIDER NOTIFICATION
MD Notification    Notified Person: MD    Notified Person Name: Chante Morley    Notification Date/Time: 6/2/20 8323     Notification Interaction: page    Purpose of Notification: pt with increased lethargy, not participative in assessments, LUE weakness.    Orders Received:    Comments:

## 2020-06-02 NOTE — PLAN OF CARE
Pt here with rule out seizures. A&Ox2, disoriented to time and situation. Difficult to assess- not participating in assessments at times. Neuros with generalized weakness and 4/5 strength, mod  and weak dorsi/plantar flexion. Increased lethargy and new LUE weakness at 0400- neurology aware, RRT called- see RRT progress note. VSS on RA. Tele SR with BBB. NPO due to coughing on water and medications- SLP to see in AM. Up with A1GBW. Impulsive and restless at times- Seroquel given. Denies pain. Slept between cares. Seizure precautions. IVF infusing at 75 mL/hr. Pt scoring yellow on the Aggression Stop Light Tool. Plan for Stroke Neuro consult today. Discharge plan: TCU vs Home care.

## 2020-06-02 NOTE — PLAN OF CARE
Discharge Planner SLP   Patient plan for discharge: Not addressed.   Current status: Bedside swallow evaluation completed. Patient presents with moderate oral and pharyngeal dysphagia. Patient unable to complete an oral motor exam. He demonstrated premature entry of thin liquids and Sx of penetration/silent aspiration via the cup due to delayed swallow response. Improved timing with nectar thick liquids via the spoon. Spillage and delay via the cup. He masticated apple sauce with decreased bolus manipulation/control and delayed swallow response without overt Sx of aspiration. Masticaction was insufficient for a semi-solid at this time. Recommend: 1. DDL 1 with nectar thick liquids. 2. Feed only when fully alert, alternate liquids/solids by spoon only. Crush medications and place in apple sauce or pudding. 3. SLP will follow up for diet tolerance and advancement as tolerated.   Barriers to return to prior living situation: Medical condition and dysphagia.  Recommendations for discharge: TCU  Rationale for recommendations: Patient will need continue ST needs for dsypahgia management. Patient is below his baseline.        Entered by: Chloe Mclain 06/02/2020 1:42 PM

## 2020-06-02 NOTE — PROGRESS NOTES
[facility]  Patient is currently open to home care services with [facility]. The patient is currently receiving RN/PT/OT services.  Counts include 234 beds at the [facility]  and team have been notified that patient is under OBSERVATION STATUS. Counts include 234 beds at the [facility] Liaison will continue to follow patient during stay. If patient is admitted to inpatient status please provide orders to resume home care at time of discharge if appropriate.

## 2020-06-02 NOTE — PROGRESS NOTES
Cross cover:    Paged regarding patient becoming increasingly restless and impulsive.  No PRN medications ordered.  Patient with known cognitive impairment.  H&P reviewed.  EKG reviewed.  --Seroquel 25 mg 4 times daily PRN for agitation  --Delirium protocol    Brandyn Graves PA-C

## 2020-06-02 NOTE — PROCEDURES
[facility]- [address] PORTABLE     REF MD: Chante Morley M.D.    Routine  EEG dated 6/1/2020  .    Description:  This is a digital EEG performed in the standard international 10-20 electrode system.      The background activity during wakefulness is mostly in the theta range (5-6Hz) and is symmetric. The patient did not fall asleep.      There is intermittent delta slowing rhythm in left hemisphere frontal area (FIRDA) at 2 Hz during awake and eye closure.     Photic stimulation without hyperventilation was performed, and no abnormalities occurred during the procedures.       No epileptic form discharge or electrographic seizures was present during the study and no seizure or seizure like movement was recorded in the video. The one-lead EKG was unremarkable.    Impression:      Abnormal EEG with generalized suppression with intermittent frontal delta slowing on the left suggests mild encephalopathy with superimposed structural lesion on the left (prior stroke) in the appropriate clinical context.     No epileptic form activity or seizure event during the study.

## 2020-06-02 NOTE — PLAN OF CARE
Pt alert to self and place, disoriented to time and situation. Restless at times, trying to get OOB.  VSS on RA, denies pain. CMS intact, weak dorsi/plantarflexion, JAIME some neuros as pt not following all commands. Pt NPO for speech consult today, also neuro consult ordered for lethargy overnight. Appears more alert this am.  Attempting to use urinal at bedside but wants to stand, too weak.  2 incontinent episodes. Skin WDL, no edema. BS hypoactive. Discharge to TCU recommended, pending. Continue to monitor.

## 2020-06-02 NOTE — PROVIDER NOTIFICATION
MD Notification    Notified Person: MD    Notified Person Name: Brandyn Goodman LEOS    Notification Date/Time: 1932 6/1/20    Notification Interaction: page    Purpose of Notification: Pt impulsive, not following directions, can I get some Seroquel?     Orders Received: 25 mg of Seroquel. If no improvement call back    Comments:

## 2020-06-02 NOTE — PROGRESS NOTES
[id number] Alert to self and place. Restless and agitated this shift, trying to get OOB. Up with assist of 2 GB and walker. VSS ex HTN and tachy at times. CMS intact. Patient does not follow neuro commands. Changed to DD1 with nectar thick liquids. BS hypoactive. Continue to monitor.

## 2020-06-02 NOTE — PROVIDER NOTIFICATION
MD Notification    Notified Person: MD    Notified Person Name: Brandyn Graves    Notification Date/Time: 6/1/20    Notification Interaction: page    Purpose of Notification: coughing on water, made NPO, Need IV seizue meds, SLP consult, and please clarify delirium precautions in regards to neuro checks.    Orders Received: SLP consult. Q8hr neuros, seizure med changed to IV.    Comments:

## 2020-06-02 NOTE — PROVIDER NOTIFICATION
MD Notification    Notified Person: MD    Notified Person Name: jessica leary    Notification Date/Time: 6/1/20 2148    Notification Interaction: page    Purpose of Notification: , do you want me to give the 13 units of lantus or hold? thanks    Orders Received: give lantus    Comments:

## 2020-06-02 NOTE — PROGRESS NOTES
06/02/20 1201   General Information   Onset Date 06/01/20   Start of Care Date 06/02/20   Patient Profile Review/OT: Additional Occupational Profile Info See Profile for full history and prior level of function   Swallowing Evaluation Bedside swallow evaluation   Behaviorial Observations Alert   Mode of current nutrition NPO   Respiratory Status Room air   Clinical Swallow Evaluation   Oral Musculature unable to assess due to poor participation/comprehension   Structural Abnormalities none present   Dentition present and adequate   Mucosal Quality dry   Swallow Eval   Feeding Assistance dependent   Clinical Swallow Eval: Thin Liquid Texture Trial   Mode of Presentation, Thin Liquids cup;straw;fed by clinician;spoon   Volume of Liquid or Food Presented 6 swallows   Oral Phase of Swallow Premature pharyngeal entry   Pharyngeal Phase of Swallow impaired;reduction in laryngeal movement;repeated swallows   Diagnostic Statement Sx of penetration/silent aspiration given delayed swallow.    Clinical Swallow Eval: Nectar Thick Liquid Texture Trial   Mode of Presentation, Nectar spoon;cup;fed by clinician   Volume of Nectar Presented 5 swallows   Oral Phase, Nectar Premature pharyngeal entry   Pharyngeal Phase, Nectar impaired;reduction in laryngeal movement;repeated swallows   Diagnostic Statement No Sx of aspiration via the spoon   Clinical Swallow Eval: Puree Solid Texture Trial   Mode of Presentation, Puree spoon;fed by clinician   Clinical Swallow Eval: Semisolid Texture Trial   Mode of Presentation, Semisolid spoon;fed by clinician   Volume of Semisolid Food Presented 2 bites   Oral Phase, Semisolid Poor AP movement;Premature pharyngeal entry   Pharyngeal Phase, Semisolid impaired;reduction in laryngeal movement;repeated swallows   Diagnostic Statement Poor mastication and bolus control.   Swallow Compensations   Swallow Compensations Alternate viscosity of consistencies;Pacing;Reduce amounts   Results Suspect silent  aspiration;Oral difficulties only   General Therapy Interventions   Planned Therapy Interventions Dysphagia Treatment   Dysphagia treatment Modified diet education;Instruction of safe swallow strategies   Swallow Eval: Clinical Impressions   Skilled Criteria for Therapy Intervention Skilled criteria met.  Treatment indicated.   Functional Assessment Scale (FAS) 3   Treatment Diagnosis Moderate dysphagia    Diet texture recommendations Dysphagia diet level 1;Nectar thick liquids   Recommended Feeding/Eating Techniques alternate between small bites and sips of food/liquid;check mouth frequently for oral residue/pocketing;hard swallow w/ each bite or sip;maintain upright posture during/after eating for 30 mins;no straws;small sips/bites   Therapy Frequency 5x/week   Predicted Duration of Therapy Intervention (days/wks) 1 week   Anticipated Discharge Disposition inpatient rehabilitation facility   Risks and Benefits of Treatment have been explained. Yes   Patient, family and/or staff in agreement with Plan of Care Yes   Clinical Impression Comments Patient presents with moderate oral and pharyngeal dysphagia. Patient unable to complete an oral motor exam. He demonstrated premature entry of thin liquids and Sx of penetration/silent aspiration via the cup due to delayed swallow response. Improved timing with nectar thick liquids via the spoon. Spillage and delay via the cup. He masticated apple sauce with decreased bolus manipulation/control and delayed swallow response without overt Sx of aspiration. Masticaction was insufficient for a semi-solid at this time. Recommend: 1. DDL 1 with nectar thick liquids. 2. Feed only when fully alert, alternate liquids/solids by spoon only. Crush medications and place in apple sauce or pudding.    Total Evaluation Time   Total Evaluation Time (Minutes) 22

## 2020-06-02 NOTE — CODE/RAPID RESPONSE
Brief house [facility] note:    RRT paged for neuro changes. Patient was noted to be more lethargic than yesterday with more pronounced left sided weakness. On my exam, patient was lethargic, barely opening eyes. He did follow commands and answer questions.  was equally weak. I could not elicit any focal neuro deficits outside of the left facial droop.     Patient was admitted for weakness and evaluated by general neurology. Has a history of left mca stroke and seizures. Notably, patient was given Seroquel last night for agitation. Blood glucose was normal.     INTERVENTIONS:  -- defer imaging at this time. Patient had CT scan yesterday morning.  -- spoke with Dr. Morley of neurology who wanted neuro critical-care to evaluate patient. ASAP consult placed.    Please page with additional concerns,    ASPEN Olivares, CNP  Hospitalist - House [facility]  Text Page  [phone])

## 2020-06-02 NOTE — PROGRESS NOTES
"SPIRITUAL HEALTH SERVICES Progress Note  [facility] 803-02    Visited pt per Palliative Consult. Pt said \"I'm doing ok.\" Pt confirmed that he is [employer], offered Fr. Dent for Anointing of Sick, pt declined at this time. Also offered a prayer, pt said no. [facility] will remain available for any future needs.        Huey Crum  Chaplain Resident  "

## 2020-06-02 NOTE — PROGRESS NOTES
Pt with increased lethargy, LUE weakness. Minimally participative in assessment. Neurology called. Per neurology call RRT.

## 2020-06-02 NOTE — CONSULTS
"  [facility]    Stroke Consult Note    Reason for Consult:  AMS    Chief Complaint: Neurologic Problem       HPI  Dustin Pearce is a 89 year old male with past medical history significant for chronic L ICA occlusion, L MCA territory infarct, HTN, HLD, DM2, and recurrent spells of speech difficulty (TIA vs seizure). He was admitted for an episode of speech difficulty, which is consist with previous presentations. He received Seroquel overnight, which is a new medication for him. This morning he was noted to be lethargic with decreased speech and commands following.    Impression  Lethargy and confusion, worse than baseline    Recommendations  - Agree with MRI brain  - Will defer further stroke evaluation pending results of MRI brain  - If negative, would defer further evaluation to general neurology and primary team     We will follow results of MRI brain.     Patient Follow-up    - final recommendation pending work-up  ASPEN Townsend, [facility]  Neurology  06/02/2020 7:37 PM  To page stroke neurology after hours or on a subsequent day, click here: AMCOM  Choose \"On Call\" tab at top, then search dropdown box for \"Neurology Adult\" & press Enter, look for Neuro ICU/Stroke       _____________________________________________________    Past Medical History   Past Medical History:   Diagnosis Date     Abdominal aortic aneurysm (H) 12/25/2016     Acute on chronic systolic congestive heart failure (H) 6/7/2018     Anemia 6/7/2018     Anemia due to blood loss, acute 6/13/2017     Aortic stenosis     mild     Benign essential hypertension 1/6/2017     Benign non-nodular prostatic hyperplasia with lower urinary tract symptoms 10/20/2016     CAD (coronary artery disease)     MI 1970     Carotid artery stenosis 10/20/2016    chronically occluded left internal carotid artery     Cerebrovascular accident (H) 10/20/2016     Cognitive impairment 6/7/2018     Coronary artery disease of native artery of native heart " with stable angina pectoris (H) 10/20/2016    MI 1970     Depression, unspecified depression type 2/22/2018     Diabetes mellitus (H)      Diabetic ulcer of left foot associated with diabetes mellitus due to underlying condition (H) 6/12/2017     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     Gastro-oesophageal reflux disease      Gastroesophageal reflux disease without esophagitis 10/20/2016     Heart attack (H)      Hyperlipidemia      Hyperlipidemia, unspecified hyperlipidemia type 10/20/2016     Ischemic cardiomyopathy      Lumbar back pain 12/30/2016     Mild cognitive impairment 10/20/2016     Nephrolithiasis     RECURRENT     NSTEMI (non-ST elevated myocardial infarction) (H) 6/7/2018     Osteopenia      PAD (peripheral artery disease) (H)     peripheral angiogram 5/2017: The common iliac artery stenoses were stented and the superficial femoral artery stenoses were angioplastied     Paroxysmal atrial fibrillation (H)      Peripheral artery disease (H)     peripheral angiogram 5/2017: The common iliac artery stenoses were stented and the superficial femoral artery stenoses were angioplastied     RBBB with left anterior fascicular block      Slow transit constipation 2/22/2018     Stroke of unknown etiology (H) 12/31/2017     Syncope      TIA (transient ischemic attack) 1/20/2017     Unspecified cerebral artery occlusion with cerebral infarction      UTI (urinary tract infection) due to Enterococcus 2/22/2018     Ventricular tachycardia (H)     nonsustained     Past Surgical History   Past Surgical History:   Procedure Laterality Date     AMPUTATE FOOT Left 6/4/2017    Procedure: AMPUTATE FOOT;  Sun Add#3: partial left foot amputation - Sagital Saw - Mini C-Arm;  Surgeon: Moe Kirk DPM;  Location: [facility] OR     CHOLECYSTECTOMY       ENDARTERECTOMY CAROTID Right 1/3/2018    Procedure: ENDARTERECTOMY CAROTID;  RIGHT CAROTID ENDARTERECTOMY WITH EEG;  Surgeon: Roland Rodriguez MD;   Location:  OR     ESOPHAGOSCOPY, GASTROSCOPY, DUODENOSCOPY (EGD), COMBINED N/A 12/26/2016    Procedure: COMBINED ESOPHAGOSCOPY, GASTROSCOPY, DUODENOSCOPY (EGD);  Surgeon: Nasim Louis MD;  Location:  GI     GENITOURINARY SURGERY      KIDNEY STONE REMOVAL X 4     HC UGI ENDOSCOPY W EUS N/A 12/29/2016    Procedure: COMBINED ENDOSCOPIC ULTRASOUND, ESOPHAGOSCOPY, GASTROSCOPY, DUODENOSCOPY (EGD);  Surgeon: Nasim Louis MD;  Location:  GI     HERNIA REPAIR       INCISION AND DRAINAGE FOOT, COMBINED Left 6/6/2017    Procedure: COMBINED INCISION AND DRAINAGE FOOT;  REVISIONAL LEFT FOOT INCISION AND DRAINAGE WITH POSSIBLE FLAP CLOSURE , ANTIBIOTIC SOLUTION ;  Surgeon: Nabil Ann DPM;  Location:  OR     LASER HOLMIUM LITHOTRIPSY URETER(S), INSERT STENT, COMBINED  3/2/2012    Procedure:COMBINED CYSTOSCOPY, URETEROSCOPY, LASER HOLMIUM LITHOTRIPSY URETER(S), INSERT STENT; CYSTOSCOPY, RIGHT RETROGRADES, RIGHT URETEROSCOPY, HOLMIUM LASER, RIGHT STENT PLACEMENT; Surgeon:ANJELICA LEÓN; Location: OR     ROTATOR CUFF REPAIR RT/LT       Medications   Home Meds  Prior to Admission medications    Medication Sig Start Date End Date Taking? Authorizing Provider   acetaminophen (TYLENOL) 500 MG tablet Take 1,000 mg by mouth 2 times daily Plus 1000 mg daily as needed for pain   Yes Reported, Patient   aspirin (ASA) 81 MG EC tablet Take 1 tablet (81 mg) by mouth daily 5/15/20  Yes Alexander Celestin APRN CNP   atorvastatin (LIPITOR) 40 MG tablet Take 1 tablet (40 mg) by mouth daily 5/15/20  Yes Alexander Celestin APRN CNP   DULoxetine (CYMBALTA) 30 MG capsule TAKE 1 CAPSULE(30 MG) BY MOUTH DAILY 5/15/20  Yes Alexander Celestin APRN CNP   insulin glargine (BASAGLAR KWIKPEN) 100 UNIT/ML pen Inject 26 Units Subcutaneous At Bedtime   Yes Reported, Patient   ipratropium (ATROVENT) 0.03 % nasal spray INHALE 1 SPRAY IN EACH NOSTRIL EVERY 12 HOURS AS NEEDED 5/6/19  Yes Matt Morrissey MD    Lacosamide (VIMPAT) 100 MG TABS tablet Take 1 tablet (100 mg) by mouth 2 times daily 5/15/20  Yes Alexander Celestin APRN CNP   levETIRAcetam (KEPPRA) 750 MG tablet Take 1 tablet (750 mg) by mouth 2 times daily 5/15/20  Yes Alexander Celestin APRN CNP   magnesium oxide (MAGNESIUM-OXIDE) 400 (241.3 Mg) MG tablet Take 1 tablet (400 mg) by mouth 2 times daily 5/15/20  Yes Alexander Celestin APRN CNP   metFORMIN (GLUCOPHAGE) 1000 MG tablet Take 1 tablet (1,000 mg) by mouth 2 times daily (with meals) 5/15/20  Yes Alexander Celestin APRN CNP   nitroGLYcerin (NITROSTAT) 0.4 MG sublingual tablet For chest pain place 1 tablet under the tongue every 5 minutes for 3 doses. If symptoms persist 5 minutes after 1st dose call 911. 5/31/18  Yes Brandyn Graves PA   omeprazole (PRILOSEC) 40 MG DR capsule TAKE 1 CAPSULE(40 MG) BY MOUTH DAILY 30 TO 60 MINUTES BEFORE A MEAL 5/15/20  Yes Alexander Celestin APRN CNP   polyethylene glycol-propylene glycol (SYSTANE ULTRA) 0.4-0.3 % SOLN ophthalmic solution Place 2 drops into both eyes daily as needed for dry eyes   Yes Unknown, Entered By History   sennosides (SENOKOT) 8.6 MG tablet Take 2 tablets by mouth daily Hold for loose stools   Yes Reported, Patient   tamsulosin (FLOMAX) 0.4 MG capsule Take 1 capsule (0.4 mg) by mouth daily 5/15/20  Yes Alexander Celestin APRN CNP   ticagrelor (BRILINTA) 90 MG tablet Take 1 tablet (90 mg) by mouth 2 times daily 5/15/20  Yes Alexander Celestin APRN CNP       Scheduled Meds    acetaminophen  1,000 mg Oral BID     aspirin  81 mg Oral Daily     insulin aspart  1-6 Units Subcutaneous Q4H     insulin glargine  13 Units Subcutaneous At Bedtime     lacosamide (VIMPAT) intermittent infusion  100 mg Intravenous BID     levETIRAcetam  750 mg Intravenous Q12H     omeprazole  40 mg Oral QAM     sennosides  2 tablet Oral Daily     tamsulosin  0.4 mg Oral Daily     ticagrelor  90 mg Oral BID       Infusion Meds    sodium  "chloride 75 mL/hr at 20       PRN Meds  acetaminophen, acetaminophen, glucose **OR** dextrose **OR** glucagon, melatonin, naloxone, ondansetron **OR** ondansetron, QUEtiapine    Allergies   Allergies   Allergen Reactions     Oxycodone Other (See Comments)     \"TERRIBLE SWEATING\"     Sulfa Drugs      Tetracycline      Family History   Family History   Problem Relation Age of Onset     Breast Cancer Mother      Heart Failure Father 81     Social History   Social History     Tobacco Use     Smoking status: Former Smoker     Packs/day: 1.00     Years: 30.00     Pack years: 30.00     Types: Cigarettes     Start date:      Last attempt to quit: 1999     Years since quittin.4     Smokeless tobacco: Never Used   Substance Use Topics     Alcohol use: Not Currently     Alcohol/week: 7.0 standard drinks     Types: 7 Standard drinks or equivalent per week     Comment: 1 drink per biweekly     Drug use: No       Review of Systems   The 10 point Review of Systems is negative other than noted in the HPI or here.        PHYSICAL EXAMINATION   Temp:  [97.3  F (36.3  C)-98.3  F (36.8  C)] 97.9  F (36.6  C)  Heart Rate:  [] 113  Resp:  [15-18] 16  BP: (114-141)/(45-64) 118/53  SpO2:  [84 %-100 %] 92 %    Neurologic  Mental Status:  alert, closes/opens eyes on command but does not follow consistently, speech limited but clear, states name  Cranial Nerves:  EOMI with normal smooth pursuit, facial movements symmetric, no dysarthria, tongue protrusion midline, hard of hearing  Motor:  no abnormal movements, all extremities spontaneously antigravity, no obvious focal weakness but doesn't follow for formal testing  Reflexes:  unable to test (telestroke)  Sensory:  grossly intact, doesn't follow for formal testing  Coordination:  no obvious ataxia  Station/Gait:  unable to test due to telestroke    Dysphagia Screen  Per Nursing    Stroke Scales    NIHSS  Interval     Interval Comments     1a. Level of " Consciousness 0-->Alert, keenly responsive   1b. LOC Questions 1-->Answers one question correctly   1c. LOC Commands 1-->Performs one task correctly   2.   Best Gaze 0-->Normal   3.   Visual 0-->No visual loss   4.   Facial Palsy 0-->Normal symmetrical movements   5a. Motor Arm, Left 1-->Drift, limb holds 90 (or 45) degrees, but drifts down before full 10 seconds, does not hit bed or other support   5b. Motor Arm, Right 1-->Drift, limb holds 90 (or 45) degrees, but drifts down before full 10 secs, does not hit bed or other support   6a. Motor Leg, Left 0-->No drift, leg holds 30 degree position for full 5 secs   6b. Motor Leg, right 0-->No drift, leg holds 30 degree position for full 5 secs   7.   Limb Ataxia 0-->Absent   8.   Sensory 0-->Normal, no sensory loss   9.   Best Language 1-->Mild-to-moderate aphasia, some obvious loss of fluency or facility of comprehension, without significant limitation on ideas expressed or form of expression. Reduction of speech and/or comprehension, however, makes conversation. . . (see row details)   10. Dysarthria 0-->Normal   11. Extinction and Inattention  0-->No abnormality   Total 5 (06/02/20 7113)       Imaging  I personally reviewed all imaging; relevant findings per HPI.    Labs Data   CBC  Recent Labs   Lab 06/02/20  0738 06/01/20  0336   WBC 11.6* 8.3   RBC 4.17* 4.09*   HGB 11.9* 11.7*   HCT 37.3* 36.6*    226     Basic Metabolic Panel   Recent Labs   Lab 06/02/20  0738 06/01/20  0336    136   POTASSIUM 3.9 4.8   CHLORIDE 106 104   CO2 24 25   BUN 15 21   CR 0.70 0.71   GLC 86 110*   ALLISON 8.8 9.3     Liver Panel  Recent Labs   Lab Test 06/01/20  0336 05/01/20  1005 03/06/20  0014   PROTTOTAL 6.8 6.5* 7.0   ALBUMIN 3.3* 3.3* 3.5   BILITOTAL 0.6 0.9 0.6   ALKPHOS 125 97 73   AST 36 15 26   ALT 28 18 19     INR  Recent Labs   Lab Test 05/01/20  1005 04/18/20  1944 03/05/20  1411   INR 1.03 1.08 1.07      Lipid Profile  Recent Labs   Lab Test 03/06/20  0014  08/25/19  0620 04/09/19  0626  12/18/15 12/05/14 11/22/13   CHOL 146 118 134   < > 198 160 151   HDL 52 43 47   < > 39* 48 41   LDL 69 49 64   < > 123 82 71   TRIG 123 130 113   < > 181* 149 197*   CHOLHDLRATIO  --   --   --   --  5.1* 3.3 3.7    < > = values in this interval not displayed.     A1C  Recent Labs   Lab Test 06/01/20  1022 03/05/20  1159 09/23/19  1512   A1C 6.3* 8.3* 7.6*     Troponin Franko results for input(s): TROPI in the last 168 hours.       Stroke Code / Stroke Consult Data Data Telestroke Service Details  (for non-emergent stroke consult with tele)  Video start time 06/02/20   1503   Video end time 06/02/20   1510   Type of service telemedicine diagnostic assessment of acute neurological changes   Reason telemedicine is appropriate patient requires assessment with a specialist for diagnosis and treatment of neurological symptoms   Mode of transmission secure interactive audio and video communication per [employer]   Originating site (patient location) [facility]    Distant site (provider location) Provider remote site

## 2020-06-02 NOTE — PROGRESS NOTES
[facility]    Medicine Progress Note - Hospitalist Service       Date of Admission:  6/1/2020    Assessment & Plan   Dustin Pearce is a 89 year old male who presents with recurrent facial droop and slurred speech at home, resolved prior to admission. Admitted for acute on chronic weakness.    Weakness, acute on chronic, generalized  Recurrent facial droop and slurred speech- resolved  Left upper extremity weakness  Encephalopathy, possibly toxic  Has had similar presentation in the past with negative workup for CVA. 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 Slurred speech and facial droop resolved prior to arrival to ED. Weakness present since 5/29 noted per wife at home. He reported being weak since March 2020 discharge from TCU, however during his last admit May 2020, he was weak and it was thought some of this was related to bradycardia noted during that admission. Recently started on antiepileptic medications out of concern for seizure activity. CT head without acute stroke. Neurology called by ED and reported no further stroke work-up, however pt developed acute neurologic change overnight 6/2 with increasing lethargy, worsening LUE weakness. Possibly related to receiving seroquel last PM due to agitation. Evaluated by house MD, upon discussion with Neurology, stroke neuro consult recommended.   - Continues on PTA keppra, vimpat  - Continues on DAPT with Brilinta/ASA  - MRI brain with/without contrast ordered given ongoing lethargy, minimal ability to participate in focused neurologic exam  - Stroke neurology consulted, awaiting evaluation  - General neurology evaluated, s/p EEG 6/1 with findings of mild encephalopathy  - Seizure precautions  - Neuro-checks  - PT/OT consults--may need TCU again, already has home-care  - Will not repeat TTE at this time as pt had one 04/2020     Bradycardia  Observed during 05/2020 admission with HR in upper 30s and low 40s--home  metoprolol was held with improvement in rates. Zio patch completed after last admit--brief premature beats and atrial/ventricular runs without significant arrhythmias, no symptoms reported. Cleared to remain off BB.  - Telemetry to monitor for further bradycardia    Diabetes  PTA Lantus 26 units at bedtime and metformin BID. A1C 8.3 March 2020.  - Moderate consistent carb diet  - Lantus 20 units at bedtime and sliding scale insulin started here  - ACHS BG checks    Coronary artery disease  Ischemic cardiomyopathy with EF 30-35%  Dyslipidemia  Hypertension  PTA lipitor 40mg, brilinta BID, ASA 81mg daily. Recently discontinued on PTA lisinopril and metoprolol in the past 1 month due to lower BPs and HR.  - Resume PTA brilinta and ASA  - Resume PTA lipitor    Seizure disorder  PTA vimpat 100mg BID, keppra 750mg BID  - Continued  - Seizure precautions in place    Gastroesophageal reflux disease  PTA prilosec 40mg daily, continued    Paroxysmal atrial fibrillation   PTA not on beta blocker anymore secondary to bradycardia, not on blood thinner either  - Monitor on telemetry    Goals of care  Cognitive impairment  Patient unable to add any information to history, so all history obtained from his wife. POLST from march is full code. Note that Dr Morrissey brought up hospice with patient and his wife on 5/22 via tele-medicine. Wife is adamantly opposed to hospice, but would potentially be interested in palliative care as she believes this would lead to more help at home.  - Attempts to discuss hospice or even af-fc-[id number] with patient's wife led nowhere  - Palliative consulted, discussed extensively with wife who ultimately wants restorative care, will discuss code status and recommend exploring the option of outpatient palliative care upon discharge. For complete details of discussion, please refer to consult note placed 6/1 by ASPEN Maldonado    Diet: NPO for Medical/Clinical Reasons Except for: No Exceptions    DVT  Prophylaxis: Pneumatic Compression Devices and DAPT  Luis Catheter: not present  Code Status: Full Code           Disposition Plan   Expected discharge: 1-2 days, recommended to tbd once mental status at baseline and safe disposition plan/ TCU bed available.  Entered: Kenneth Ambrosio PA-C 06/02/2020, 1:00 PM       The patient's care was discussed with the Attending Physician, Dr. Whitaker, Bedside Nurse, Care Coordinator/ and Patient.    Kenneth Ambrosio PA-C  Hospitalist Service  [facility]    ______________________________________________________________________    Interval History   Pt seen & evaluated. Resting in bed, responds intermittently to questions. Does not follow commands.     Data reviewed today: I reviewed all medications, new labs and imaging results over the last 24 hours. I personally reviewed no images or EKG's today.    Physical Exam   Vital Signs: Temp: 98  F (36.7  C) Temp src: Oral BP: (!) 141/64   Heart Rate: 88 Resp: 16 SpO2: 100 % O2 Device: None (Room air)    Weight: 150 lbs 0 oz  GEN: well-developed, well-nourished, appears comfortable  HEENT: NCAT, nose & mouth patent, mucous membranes moist  CHEST: lungs CTA bilaterally, no increased work of breathing, no wheeze, rales, rhonchi  HEART: RRR, S1 & S2, no murmur  ABD: soft, nontender, nondistended, no guarding or rigidity, +BS in all 4 quadrants  MSK: full AROM bilateral UE/LE, pedal & radial pulses 2+ bilaterally  NEURO: sleeping, responds verbally to voice but will not spontaneously open eyes, oriented to name. Facial expressions symmetric. Does not follow commands, unable to assess  strength, limb ROM, or plantar flexion/dorsiflexion  SKIN: warm & dry without rash, no pedal edema    Data   Recent Labs   Lab 06/02/20  0738 06/01/20  0336   WBC 11.6* 8.3   HGB 11.9* 11.7*   MCV 89 90    226    136   POTASSIUM 3.9 4.8   CHLORIDE 106 104   CO2 24 25   BUN 15 21   CR 0.70 0.71   ANIONGAP 8 7   ALLISON  8.8 9.3   GLC 86 110*   ALBUMIN  --  3.3*   PROTTOTAL  --  6.8   BILITOTAL  --  0.6   ALKPHOS  --  125   ALT  --  28   AST  --  36     No results found for this or any previous visit (from the past 24 hour(s)).  Medications     sodium chloride 75 mL/hr at 06/01/20 2121       acetaminophen  1,000 mg Oral BID     aspirin  81 mg Oral Daily     insulin aspart  1-6 Units Subcutaneous Q4H     insulin glargine  13 Units Subcutaneous At Bedtime     lacosamide (VIMPAT) intermittent infusion  100 mg Intravenous BID     levETIRAcetam  750 mg Intravenous Q12H     omeprazole  40 mg Oral QAM     sennosides  2 tablet Oral Daily     tamsulosin  0.4 mg Oral Daily     ticagrelor  90 mg Oral BID

## 2020-06-02 NOTE — PLAN OF CARE
[id number]: A&O to self. Seizure precautions. Neuros intact ex: unable to perform pronator & nose touch (can't lift his arms). Wife called to say his vision is decreased at baseline and he doesn't have his glasses here. VSS on RA. Denies pain. Repositioned but he also moves around in the bed. BG WDL no insulin coverage needed. DD1 diet w/ nectar thick liquids. Tele ST w/ BBB.

## 2020-06-02 NOTE — PROGRESS NOTES
[facility]    Neurology Progress Note     Assessment & Plan   Dustin Pearce is a 89 year old male with a history of HTN, DM, CAD, P afib, Lt MCA infarct,  TIA and seizure ( vimpat and keppra 750 BID) who presents via ambulance for the evaluation of  left sided facial droop and slurred speech.     Presumed Epilepsy (transient and recurrent episode)  No event of facial droop and slurred speech since admission          -Keppra level- 40- therapeutic  -EEG routine- no epileptic form acitivtiy, seizure activity- mild encephalopathy with superimposed structural lesion on the left (prior stroke  -On Keppra 750mg BID and Vimpat 100 mg BID (192- FL interval wnl)  -On Brilinta  - Recent MRI brain 4/18/20  1. No evidence for intracranial hemorrhage, acute infarct, or any  focal mass lesions.  2. 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  3. Moderate cerebral artery with moderately extensive brainstem and  white matter signal abnormalities most likely due to chronic small  vessel ischemic disease.   4. A few tiny old right cerebellar infarcts also noted.   -MRI brain- pending     Chante Morley MD  [facility]  Office Phone [phone]    Disposition Plan      Chante Morley MD    Interval History   On 6/2 around 4:47 am patient had an episode of more lethargy with questionable unilateral handgrip weakness, I was called by pager and agreed calling RRT. RRT assessed the patient and did not have focal deficit but appeared more lethargic. It was recommended to get CTH and call stroke consult from critical care.    Around noon the patient appeared to be awake but more anxious and agitated.    He was oriented only to place and person  Cranial nerve were grossly intact  Moving all extremities at least 3/5      Physical Exam   Temp: 97.9  F (36.6  C) Temp src: Oral BP: 118/53   Heart Rate: 113 Resp: 16 SpO2: 92 % O2 Device: None (Room air)    Vitals:    06/01/20 0318   Weight: 68 kg (150 lb)     Vital Signs with Ranges  Temp:  [97.3  F  (36.3  C)-98.3  F (36.8  C)] 97.9  F (36.6  C)  Heart Rate:  [] 113  Resp:  [15-18] 16  BP: (114-141)/(45-64) 118/53  SpO2:  [84 %-100 %] 92 %  I/O last 3 completed shifts:  In: 240 [P.O.:240]  Out: 125 [Urine:125]       Medications     sodium chloride 75 mL/hr at 06/01/20 2121       acetaminophen  1,000 mg Oral BID     aspirin  81 mg Oral Daily     insulin aspart  1-6 Units Subcutaneous Q4H     insulin glargine  13 Units Subcutaneous At Bedtime     lacosamide (VIMPAT) intermittent infusion  100 mg Intravenous BID     levETIRAcetam  750 mg Intravenous Q12H     omeprazole  40 mg Oral QAM     sennosides  2 tablet Oral Daily     tamsulosin  0.4 mg Oral Daily     ticagrelor  90 mg Oral BID       Data   Results for orders placed or performed during the hospital encounter of 06/01/20 (from the past 24 hour(s))   Glucose by meter   Result Value Ref Range    Glucose 94 70 - 99 mg/dL   Glucose by meter   Result Value Ref Range    Glucose 86 70 - 99 mg/dL   Glucose by meter   Result Value Ref Range    Glucose 93 70 - 99 mg/dL   Glucose by meter   Result Value Ref Range    Glucose 82 70 - 99 mg/dL   CBC with platelets   Result Value Ref Range    WBC 11.6 (H) 4.0 - 11.0 10e9/L    RBC Count 4.17 (L) 4.4 - 5.9 10e12/L    Hemoglobin 11.9 (L) 13.3 - 17.7 g/dL    Hematocrit 37.3 (L) 40.0 - 53.0 %    MCV 89 78 - 100 fl    MCH 28.5 26.5 - 33.0 pg    MCHC 31.9 31.5 - 36.5 g/dL    RDW 15.4 (H) 10.0 - 15.0 %    Platelet Count 180 150 - 450 10e9/L   Basic metabolic panel   Result Value Ref Range    Sodium 138 133 - 144 mmol/L    Potassium 3.9 3.4 - 5.3 mmol/L    Chloride 106 94 - 109 mmol/L    Carbon Dioxide 24 20 - 32 mmol/L    Anion Gap 8 3 - 14 mmol/L    Glucose 86 70 - 99 mg/dL    Urea Nitrogen 15 7 - 30 mg/dL    Creatinine 0.70 0.66 - 1.25 mg/dL    GFR Estimate 82 >60 mL/min/[1.73_m2]    GFR Estimate If Black >90 >60 mL/min/[1.73_m2]    Calcium 8.8 8.5 - 10.1 mg/dL   CK total   Result Value Ref Range    CK Total 238 30 - 300  U/L   Glucose by meter   Result Value Ref Range    Glucose 95 70 - 99 mg/dL   Glucose by meter   Result Value Ref Range    Glucose 116 (H) 70 - 99 mg/dL      This is a telemedicine visit that was performed with the originating site at [facility] and the distant site at provider s home in [place], [place]. Verbal consent was given to participate in video visit using [email or web address] real-time telemedicine video conference.  This visit occurred during the Coronavirus (COVID-19) Public Health Emergency and was requested by patient due to contact concerns.     I discussed with the patient the nature of our telemedicine visits, that:      I would evaluate the patient and recommend diagnostics and treatments based on my assessment and the exam is limited due to the nature of telemedicine visit.    Our sessions are not being recorded and that personal health information is protected    Our team would provide followup care in person if/when the patient needs it.     Patient identification was verified before the start of the encounter.     total time : 35 minutes  More than 50% of the time was spent on discussing/coordinating care with hospital staffs.

## 2020-06-02 NOTE — PROGRESS NOTES
Cross cover:    Paged regarding patient coughing on medications and liquids.  Concern for dysphasia and unsafe swallowing.  Per RN, will need to make patient n.p.o.  --SLP consult placed  --N.p.o.  --Keppra and Vimpat changed to IV form (use only temporary per pharmacy)  --Reduced Lantus to half home dose, 13 units  --Every 4 hour Accu-Cheks, moderate dose sliding scale insulin  --Maintenance IV fluids while n.p.o., normal saline at 75 mL/h  --Every 8 hour neuro checks for delirium protocol    Brandyn Graves PA-C

## 2020-06-03 NOTE — PROGRESS NOTES
[facility]    Medicine Progress Note - Hospitalist Service       Date of Admission:  6/1/2020    Assessment & Plan   Dustin Pearce is a 89 year old male who presented 6/1/2020 with recurrent facial droop and slurred speech at home, resolved prior to admission. Admitted for acute on chronic weakness. See H&P by Dr. Fontanez for complete details on presentation.     Weakness, acute on chronic, generalized  Recurrent facial droop and slurred speech, resolved  Left upper extremity weakness, resolved  Encephalopathy, possibly toxic, resolved   Hx of chronic left frontal lobe and small right cerebellar infarcts   Hx of chronic occlusion of the left ICA: Has had similar presentation in the past with negative workup for CVA. 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 Slurred speech and facial droop resolved prior to arrival to ED. Weakness present since 5/29 noted per wife at home. He reported being weak since March 2020 discharge from TCU, however during his last admit May 2020, he was weak and it was thought some of this was related to bradycardia noted during that admission. Recently started on antiepileptic medications out of concern for seizure activity. CT head without acute stroke. Neurology called by ED and reported no further stroke work-up, however pt developed acute neurologic change overnight 6/2 with increasing lethargy, worsening LUE weakness. MRI brain negative 6/3/2020. Keppra level therapeutic at 40, EEG with no epileptic form activity, seizure activity, mild encephalopathy with superimposed structural lesion on the left (prior stroke).   - Stroke Neurology consulted for increased lethargy subsequent to RRT on 6/2, refer to note by Kalyani Butler CNP, MRI without acute pathology. Question adverse reaction to overnight Seroquel that had been given for agitation.  - General Neurology consulted on admission, see note by Dr. Morley. Discussed POC, no further testing recommended,  no change to AED regimen. Recommend close follow-up with pt's primary neurologist, Dr. Padilla.    - Continues on PTA keppra 750 mg BID, vimpat 100 mg BID  - Continues on DAPT with Brilinta/ASA  - Seizure precautions  - Neuro-checks  - PT/OT recommending TCU   - SW for discharge planning, wife prefers referrals be sent to [facility]   - Care Coordinator consulted to help facilitate both PCP follow-up and [facility] (Dr. Padilla) follow-up    Leukocytosis: WBC on admission 8.3>11.6>12.9. Afebrile, no hypoxia. UA unremarkable. CT A/P negative for acute pathology, extensive diverticulosis noted, 9 mm non-obstructing stone in the inferior left kidney. Lung bases clear of acute infiltrate.   - Monitor, BMP in the AM     Cognitive impairment: SLUMs from 5/2020 was 11/30.   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 Noted.     Moderate oral and pharyngeal dysphagia:   - SLP consulted 6/3/2020, downgraded to DDL1 with NTL. Recommend TCU at discharge. Appreciate ongoing recommendations.    Sinus bradycardia, resolved  Hx of nonsustained VT: Observed during 05/2020 admission with HR in upper 30s and low 40s--home metoprolol was held with improvement in rates. Zio patch completed after last admit--brief premature beats and atrial/ventricular runs without significant arrhythmias, no symptoms reported. Cleared to remain off BB. Note prior evaluation of syncope in 5/2019 with Ziopatch confirming NSVT, loop recorder recommended at that time, but patient and wife declined.   - HR WNL over the past 24 hours   - Telemetry     Hypoglycemic episode   T2DM, insulin dependent: A1C 8.3 March 2020.  [Lantus 26 units at bedtime and metformin 1000 mg BID]  -  Pt received Lantus 13 U at bedtime overnight with subsequent hypoglycemia this AM. Note NPO 6/2/2020 until SLP evaluated and pt has been on DD1, NTL diet. Will further reduce  Lantus to 7 U at bedtime  - Holding Metformin  - Moderate consistent carb diet  - BS per protocol  - Monitor for hypoglycemia     Recent Labs   Lab 06/03/20  1208 06/03/20  0908 06/03/20  0744 06/03/20  0211 06/02/20  2048 06/02/20  1655 06/02/20  1124 06/02/20  0738  06/01/20  0336   GLC  --   --  59*  --   --   --   --  86  --  110*   * 68*  --  118* 182* 116* 95  --    < >  --     < > = values in this interval not displayed.     Severe coronary artery disease s/p stenting to LAD and cx (2018) with known  of the RCA   Ischemic cardiomyopathy with EF 30-35%  Hypertension, Dyslipidemia: Follows with Dr. Samuel of Cardiology Admitted in May 2018 with a non-ST elevation myocardial infarction.  He was found to have severe three-vessel coronary artery disease on coronary angiogram including a  of the RCA, proximal severe LAD disease and severe proximal left circumflex disease.  He underwent stenting to the LAD and circumflex.  PTA lipitor 40mg, brilinta BID, ASA 81mg daily. Recently discontinued on PTA lisinopril and metoprolol in the past 1 month due to lower BPs and HR.  - Resume PTA brilinta and ASA  - Resume PTA lipitor    Seizure disorder  PTA vimpat 100mg BID, keppra 750mg BID  - Continued  - Seizure precautions in place    Gastroesophageal reflux disease  PTA prilosec 40mg daily, continued    Paroxysmal atrial fibrillation   PTA not on beta blocker anymore secondary to bradycardia, not on blood thinner either  - Monitor on telemetry    PAD   Hx right carotid endarterectomy and chronic left carotid occlusion   Hx common left iliac stenting, left SFA angioplasty, and left axillary and subclavian stenosis: Noted.   - Continue DAPT and statin as above.    Goals of care: POLST from march is full code. Note that Dr Morrissey brought up hospice with patient and his wife on 5/22 via tele-medicine. Wife is adamantly opposed to hospice, but would potentially be interested in palliative care as she believes this  would lead to more help at home.  - Attempts by admitting provider to discuss hospice or even do-not-hospitalize with patient's wife led nowhere  - Palliative consulted, discussed extensively with wife who ultimately wants restorative care, will discuss code status and recommend exploring the option of outpatient palliative care upon discharge. For complete details of discussion, please refer to consult note placed 6/1 by ASPEN Maldonado    Covid status: Negative on 5/1/2020.     Family discussion: Updated pt's significant other, Halina, via telephone.     Diet: Combination Diet Dysphagia Diet Level 1: Pureed; Nectar Thickened Liquids (pre-thickened or use instant food thickener) (By spoon)    DVT Prophylaxis: Pneumatic Compression Devices and DAPT  Luis Catheter: not present  Code Status: Full Code         Disposition Plan   Expected discharge: 1-2 days, recommended to tbd once mental status at baseline and safe disposition plan/ TCU bed available.  Entered: Evelyn Gutierrez PA-C 06/03/2020, 1:31 PM     The patient's care was discussed with the Attending Physician, Dr. Whitaker, Bedside Nurse, Care Coordinator/ and Patient.    Evelyn Gutierrez PA-C  Hospitalist Service  [facility]  ______________________________________________________________________    Interval History   No acute events overnight. Pt pleasantly confused, follows commands. No focal neuro deficits.     Data reviewed today: I reviewed all medications, new labs and imaging results over the last 24 hours. I personally reviewed no images or EKG's today   Physical Exam   Vital Signs: Temp: 97.3  F (36.3  C) Temp src: Oral BP: 104/50   Heart Rate: 59 Resp: 16 SpO2: 96 % O2 Device: None (Room air)    Weight: 136 lbs 0 oz     CONSTITUTIONAL: Pt laying in bed, dressed in hospital garb. Appears comfortable. Pleasantly confused, alert, cooperative with interview.   HEENT: Normocephalic,  atraumatic. Baseline blindness/poor vision.   CARDIOVASCULAR: RRR, no murmurs, rubs, or extra heart sounds appreciated. Pulses +2/4 and regular in upper and lower extremities, bilaterally.   RESPIRATORY: No increased work of breathing.  CTA, bilat; no wheezes, rales, or rhonchi appreciated.  GASTROINTESTINAL:  Abdomen soft, non-distended. BS auscultated in all four quadrants. Negative for tenderness to palpation.  No masses or organomegaly noted.  MUSCULOSKELETAL: Strength +5/5 in upper and lower extremities, bilaterally. No gross deformities noted. Normal muscle tone.   HEMATOLOGIC/LYMPHATIC/IMMUNOLOGIC: Negative for lower extremity edema, bilaterally.  NEUROLOGIC: Alert and oriented to self only. Follows commands.  strength intact. CN's II-XII grossly intact. Sensation equal and intact in upper and lower extremities, bilat.   SKIN: Warm, dry, intact. No jaundice noted. Negative for suspicious lesions, rashes, bruising, open sores or abrasions.     Data   Recent Labs   Lab 06/03/20  0744 06/02/20  0738 06/01/20  0336   WBC 12.9* 11.6* 8.3   HGB 10.5* 11.9* 11.7*   MCV 90 89 90    180 226    138 136   POTASSIUM 3.8 3.9 4.8   CHLORIDE 109 106 104   CO2 25 24 25   BUN 19 15 21   CR 0.86 0.70 0.71   ANIONGAP 5 8 7   ALLISON 8.6 8.8 9.3   GLC 59* 86 110*   ALBUMIN  --   --  3.3*   PROTTOTAL  --   --  6.8   BILITOTAL  --   --  0.6   ALKPHOS  --   --  125   ALT  --   --  28   AST  --   --  36     Recent Results (from the past 24 hour(s))   MR Brain w/o & w Contrast    Narrative    MRI BRAIN WITHOUT AND WITH CONTRAST  6/3/2020 9:08 AM     HISTORY: Neuro deficit(s), subacute. Altered level of consciousness  (LOC), unexplained.     TECHNIQUE: Multiplanar, multisequence MRI of the brain without and  with 6 mL Gadavist.     COMPARISON: CT head 6/1/2020. MRI head 4/19/2020.     FINDINGS: No abnormal intracranial restricted diffusion to suggest  acute infarct. Unchanged moderate-sized chronic left frontal  lobe  encephalomalacia related to remote left middle cerebral artery  territory infarct. Unchanged multiple small right cerebellar chronic  infarcts. Moderate diffuse brain parenchymal volume loss. Moderate to  severe presumed chronic small vessel ischemic disease involving the  cerebral white matter and to a lesser degree involving the wade. No  hemorrhage, extra-axial fluid collection, or mass effect. The  ventricles are within normal limits in size and configuration without  evidence for hydrocephalus. No abnormal intracranial postcontrast  enhancement is appreciated.    Bilateral lens replacements. Orbits otherwise normal. Trace scattered  paranasal sinus mucosal thickening. Absence of the left internal  carotid artery flow void consistent with known chronic occlusion. The  other major vascular flow voids of the skull base are maintained.      Impression    IMPRESSION:  1. No acute intracranial abnormality.  2. Unchanged chronic left frontal lobe infarct and small right  cerebellar infarcts.  3. 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  4. Moderate global parenchymal volume loss and moderate to severe  presumed chronic small vessel ischemic disease.    ANJELICA HICKEY MD     Medications     sodium chloride 75 mL/hr at 06/01/20 2121       acetaminophen  1,000 mg Oral BID     aspirin  81 mg Oral Daily     insulin aspart  1-7 Units Subcutaneous TID AC     insulin aspart  1-5 Units Subcutaneous At Bedtime     insulin glargine  7 Units Subcutaneous At Bedtime     lacosamide (VIMPAT) intermittent infusion  100 mg Intravenous BID     levETIRAcetam  750 mg Intravenous Q12H     omeprazole  40 mg Oral QAM     sennosides  2 tablet Oral Daily     tamsulosin  0.4 mg Oral Daily     ticagrelor  90 mg Oral BID

## 2020-06-03 NOTE — PROGRESS NOTES
"Brief Stroke Progress Note:     Briefly, Mr. Pearce is a 89 year old male with past medical history significant for chronic L ICA occlusion, L MCA territory infarct, HTN, HLD, DM2, and recurrent spells of speech difficulty (TIA vs seizure). Yesterday morning he was noted to be more lethargic and confused. He did receive a dose of Seroquel overnight prior to these worsening symptoms, which is a new medication for him. MRI brain completed this morning without evidence of acute pathology.      Impression & Plan    1. Episodes of speech difficulty, likely seizure vs recrudescence of chronic infarcts. MRI brain negative for acute pathology.   - No further stroke evaluation indicated  - Continued evaluation by general neurology   - Seizure precautions    2. Increased lethargy and confusion, improving on exam yesterday, possibly related to overnight Seroquel  - MRI brain negative for acute pathology   - If agitated, consider lower dose Seroquel     Thank you for this consult. No further stroke evaluation is indicated, we will sign off. Please contact us with additional questions.     ASPEN Townsend, [facility]  Neurology  06/03/2020 10:45 AM  To page stroke neurology after hours or on a subsequent day, click here: AMCOM  Choose \"On Call\" tab at top, then search dropdown box for \"Neurology Adult\" & press Enter, look for Neuro ICU/Stroke         "

## 2020-06-03 NOTE — PROVIDER NOTIFICATION
MD Notification    Person notified:on call hosp    Person Name:    Date/Time:6/2/20 1937    Interaction:page    Purpose of Notification:Pt on a diet now, can BG checks and insulin be changed to with meals and at bedtime?    Orders Received: Orders changed.    Comments:

## 2020-06-03 NOTE — PROGRESS NOTES
Hospitalist cross cover    Called RE patient now eating, adjust insulin.  Glucose checks changed to before meals and at bedtime, SSI adjusted.    Chloe Rao (Zarate), PA-C  Hospitalist [facility]  Pager: [phone]

## 2020-06-03 NOTE — PLAN OF CARE
Pt Alert, disoriented to time & situation. VSS on RA. Up with 1. Skin intact. Poor appetite. Takes meds crushed in pudding. Dispo pending. Call light within reach, non slip socks on when OOB & bed in lowest/locked position. Will continue to monitor.

## 2020-06-03 NOTE — PLAN OF CARE
Discharge Planner SLP   Patient plan for discharge: Not addressed.   Current status: SLP: Patient seen for swallow treatment while up in the chair. Mildly distracted on how to use the phone. He was given trials of water via the cup and demonstrated premature entry and delayed coughing. he was able to tolerate nectar thick liquids via the cup on consecutive swallows without Sx of aspiration. Mastication was slow and decreased bolus coordination and transport with mild oral residue. Patient not ready for a diet advancement, but can have liquids via the cup.   Recommend: 1 Continue on the DDL 1 with nectar thick liquids. 2. Upright, no straws, small bites/sips and alternate liquids solids every few bites.   Barriers to return to prior living situation: Dysphagia and confusion.   Recommendations for discharge: TCU  Rationale for recommendations: Patient will need on going ST needs for dysphagia management for safe diet advancement. Patient is below his baseline.        Entered by: Chloe Mclain 06/03/2020 2:28 PM

## 2020-06-03 NOTE — PROGRESS NOTES
[facility]    Neurology Progress Note     Assessment & Plan   Dustin Pearce is a 89 year old male with a history of HTN, DM, CAD, P afib, Lt MCA infarct,  TIA and seizure ( vimpat and keppra 750 BID) who presents via ambulance for the evaluation of  left sided facial droop and slurred speech.     Presumed Epilepsy (transient and recurrent episode)  No event of facial droop and slurred speech since admission   No change in his current management  No further work up from neurology standpoint       -Keppra level- 40- therapeutic  -EEG routine- no epileptic form acitivtiy, seizure activity- mild encephalopathy with superimposed structural lesion on the left (prior stroke  -On Keppra 750mg BID and Vimpat 100 mg BID (192- TX interval wnl)- will continue without changing medication  -On Brilinta  - Recent MRI brain 4/18/20  1. No evidence for intracranial hemorrhage, acute infarct, or any  focal mass lesions.  2. 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  3. Moderate cerebral artery with moderately extensive brainstem and  white matter signal abnormalities most likely due to chronic small  vessel ischemic disease.   4. A few tiny old right cerebellar infarcts also noted.   -MRI brain 6/2- No acute intracranial abnormality.stable Left MCA and Rt cbll infarct  5 Outpatient neurology follow up with his own neurologist    Neurology will sign off      Chante Morley MD  [facility] Neurology  Office Phone [phone]    Disposition Plan      Chante Morley MD    Interval History   On 6/2 around 4:47 am patient had an episode of more lethargy with questionable unilateral handgrip weakness, I was called by pager and agreed calling RRT. RRT assessed the patient and did not have focal deficit but appeared more lethargic. It was recommended to get CTH and call stroke consult from critical care.    Around noon the patient appeared to be awake but more anxious and agitated.    He was oriented only to place and person  Cranial nerve were grossly  intact  Moving all extremities at least 3/5      Physical Exam   Temp: 97.3  F (36.3  C) Temp src: Oral BP: 104/50   Heart Rate: 59 Resp: 16 SpO2: 96 % O2 Device: None (Room air)    Vitals:    06/01/20 0318 06/03/20 0031   Weight: 68 kg (150 lb) 61.7 kg (136 lb)     Vital Signs with Ranges  Temp:  [97.3  F (36.3  C)-97.9  F (36.6  C)] 97.3  F (36.3  C)  Heart Rate:  [] 59  Resp:  [16] 16  BP: ()/(50-61) 104/50  SpO2:  [92 %-96 %] 96 %  I/O last 3 completed shifts:  In: -   Out: 225 [Urine:225]       Medications     sodium chloride 75 mL/hr at 06/01/20 2121       acetaminophen  1,000 mg Oral BID     aspirin  81 mg Oral Daily     insulin aspart  1-7 Units Subcutaneous TID AC     insulin aspart  1-5 Units Subcutaneous At Bedtime     insulin glargine  7 Units Subcutaneous At Bedtime     lacosamide (VIMPAT) intermittent infusion  100 mg Intravenous BID     levETIRAcetam  750 mg Intravenous Q12H     omeprazole  40 mg Oral QAM     sennosides  2 tablet Oral Daily     tamsulosin  0.4 mg Oral Daily     ticagrelor  90 mg Oral BID       Data   Results for orders placed or performed during the hospital encounter of 06/01/20 (from the past 24 hour(s))   Glucose by meter   Result Value Ref Range    Glucose 116 (H) 70 - 99 mg/dL   Glucose by meter   Result Value Ref Range    Glucose 182 (H) 70 - 99 mg/dL   Glucose by meter   Result Value Ref Range    Glucose 118 (H) 70 - 99 mg/dL   CBC with platelets differential   Result Value Ref Range    WBC 12.9 (H) 4.0 - 11.0 10e9/L    RBC Count 3.65 (L) 4.4 - 5.9 10e12/L    Hemoglobin 10.5 (L) 13.3 - 17.7 g/dL    Hematocrit 32.9 (L) 40.0 - 53.0 %    MCV 90 78 - 100 fl    MCH 28.8 26.5 - 33.0 pg    MCHC 31.9 31.5 - 36.5 g/dL    RDW 15.8 (H) 10.0 - 15.0 %    Platelet Count 193 150 - 450 10e9/L    Diff Method Automated Method     % Neutrophils 74.3 %    % Lymphocytes 15.8 %    % Monocytes 9.3 %    % Eosinophils 0.2 %    % Basophils 0.2 %    % Immature Granulocytes 0.2 %    Nucleated  RBCs 0 0 /100    Absolute Neutrophil 9.6 (H) 1.6 - 8.3 10e9/L    Absolute Lymphocytes 2.0 0.8 - 5.3 10e9/L    Absolute Monocytes 1.2 0.0 - 1.3 10e9/L    Absolute Eosinophils 0.0 0.0 - 0.7 10e9/L    Absolute Basophils 0.0 0.0 - 0.2 10e9/L    Abs Immature Granulocytes 0.0 0 - 0.4 10e9/L    Absolute Nucleated RBC 0.0    Basic metabolic panel   Result Value Ref Range    Sodium 139 133 - 144 mmol/L    Potassium 3.8 3.4 - 5.3 mmol/L    Chloride 109 94 - 109 mmol/L    Carbon Dioxide 25 20 - 32 mmol/L    Anion Gap 5 3 - 14 mmol/L    Glucose 59 (L) 70 - 99 mg/dL    Urea Nitrogen 19 7 - 30 mg/dL    Creatinine 0.86 0.66 - 1.25 mg/dL    GFR Estimate 75 >60 mL/min/[1.73_m2]    GFR Estimate If Black 87 >60 mL/min/[1.73_m2]    Calcium 8.6 8.5 - 10.1 mg/dL   Glucose by meter   Result Value Ref Range    Glucose 68 (L) 70 - 99 mg/dL   MR Brain w/o & w Contrast    Narrative    MRI BRAIN WITHOUT AND WITH CONTRAST  6/3/2020 9:08 AM     HISTORY: Neuro deficit(s), subacute. Altered level of consciousness  (LOC), unexplained.     TECHNIQUE: Multiplanar, multisequence MRI of the brain without and  with 6 mL Gadavist.     COMPARISON: CT head 6/1/2020. MRI head 4/19/2020.     FINDINGS: No abnormal intracranial restricted diffusion to suggest  acute infarct. Unchanged moderate-sized chronic left frontal lobe  encephalomalacia related to remote left middle cerebral artery  territory infarct. Unchanged multiple small right cerebellar chronic  infarcts. Moderate diffuse brain parenchymal volume loss. Moderate to  severe presumed chronic small vessel ischemic disease involving the  cerebral white matter and to a lesser degree involving the wade. No  hemorrhage, extra-axial fluid collection, or mass effect. The  ventricles are within normal limits in size and configuration without  evidence for hydrocephalus. No abnormal intracranial postcontrast  enhancement is appreciated.    Bilateral lens replacements. Orbits otherwise normal. Trace  scattered  paranasal sinus mucosal thickening. Absence of the left internal  carotid artery flow void consistent with known chronic occlusion. The  other major vascular flow voids of the skull base are maintained.      Impression    IMPRESSION:  1. No acute intracranial abnormality.  2. Unchanged chronic left frontal lobe infarct and small right  cerebellar infarcts.  3. 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  4. Moderate global parenchymal volume loss and moderate to severe  presumed chronic small vessel ischemic disease.    ANJELICA HICKEY MD   Glucose by meter   Result Value Ref Range    Glucose 103 (H) 70 - 99 mg/dL      This is a telemedicine visit that was performed with the originating site at [facility] and the distant site at provider s home in [place], [place]. Verbal consent was given to participate in video visit using [employer] real-time telemedicine video conference.  This visit occurred during the Coronavirus (COVID-19) Public Health Emergency and was requested by patient due to contact concerns.     I discussed with the patient the nature of our telemedicine visits, that:      I would evaluate the patient and recommend diagnostics and treatments based on my assessment and the exam is limited due to the nature of telemedicine visit.    Our sessions are not being recorded and that personal health information is protected    Our team would provide followup care in person if/when the patient needs it.     Patient identification was verified before the start of the encounter.     total time : 35 minutes  More than 50% of the time was spent on discussing/coordinating care with hospital staffs.

## 2020-06-03 NOTE — PLAN OF CARE
Pt here with suspected seizures. Disoriented to time, situation. Neuros intact.  VSS. Tele SR w/BBB. DD1 diet, nectar thick liquids. Takes pills crushed with pudding. BG check WNL. Up with 1-2 assist. Restless in bed at times. Denies pain. Plan for MRI today Discharge plan pending.

## 2020-06-03 NOTE — CONSULTS
Care Coordination:    Care Transition Initial Assessment - RN        Met with: Significant other Halina by phone.  DATA   Active Problems:    Weakness       Cognitive Status: Pt is confused.        Contact information and PCP information verified: yes  Lives With: spouse   Living Arrangements: house                 Insurance concerns: No Insurance issues identified  ASSESSMENT  Patient currently receives the following services:  Pt lives with significant other Halina.  He was open to [facility] for RN/PT/OT       Identified issues/concerns regarding health management: Pt admitted with weakness, facial droop and slurred speech which has resolved. Seen by Therapies and TCU was recommended.    Halina requested referrals be sent to [place] (he had been here before)  Pt was declined.  Updated Halina and she requested [facility] and [facility] (does not want [facility]).  Referrals sent through [facility].    updated  PLAN  Financial costs for the patient include TBD .  Patient given options and choices for discharge yes .  Patient/family is agreeable to the plan?  Yes:   Patient anticipates discharging to TCU .        Patient anticipates needs for home equipment: No  Transportation/person available to transport on day of discharge  is [facility] and have they been notified/set up TBD  Plan/Disposition: TCU   Appointments: TBD      Care  (CTS) will continue to follow as needed.      Alysia Joe RN BSN  Inpatient Care Coordination  [facility]  [phone]

## 2020-06-04 NOTE — PLAN OF CARE
A&Ox3, ex time, forgetful, [place], a little agitated at beginning of shift.  AVSS, on RA.  Denies pain. LS dim.  Appetite fair, pureed diet with nectar thick liquid.  Pills crushed and given with pudding.  BG covered per sliding scale insulin.  SZ precaution in place.  Up with 1 assist, GB and walker.

## 2020-06-04 NOTE — PROGRESS NOTES
[facility]    Medicine Progress Note - Hospitalist Service       Date of Admission:  6/1/2020    Assessment & Plan   Dustin Pearce is a 89 year old male who presented 6/1/2020 with recurrent facial droop and slurred speech at home, resolved prior to admission. Admitted for acute on chronic weakness. See H&P by Dr. Fontanez for complete details on presentation.     Acute on chronic generalized weakness.  Weakness present since 5/29 noted per wife at home. He reported being weak since March 2020 discharge from TCU, however during his last admit May 2020, he was weak and it was thought some of this was related to bradycardia noted during that admission. Mild leukocytosis on adm 11.6. No sxs infection. UA unremarkable. CT A/P negative for acute pathology, extensive diverticulosis noted, 9 mm non-obstructing stone in the inferior left kidney. Lung bases clear of acute infiltrate. No sinister arrhythmia noted on tele.  - PT/OT recommending TCU   - SW for discharge planning, wife prefers referrals be sent to [place] and [facility]     **ADDENDUM**: Discussed with social work and significant other. Updated about medical work up and changes. Social work needs additional facility referrals and has not been able to reach significant other after multiple attempts.    Neurologic spells.  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   Unspecified seizure disorder.  Slurred speech and facial droop resolved prior to arrival to ED. Multiple similar presentations in the past with negative workup for CVA. CTH in ED without acute stroke. Neurology called by ED and reported no further stroke work-up, however pt developed acute neurologic change overnight 6/2 with increasing lethargy and worsening LUE weakness. MRI brain negative 6/3/2020. Stroke neurology evaluated and thought possibly adverse reaction to Seroquel which was given for agitation.    Neurology clinic notes from mid winter  indicate episodes occurring 2-3/wk and patient was recently started on antiepileptic medications out of concern for seizure activity. Keppra level therapeutic at 40. EEG with no epileptic form activity, seizure activity, mild encephalopathy with superimposed structural lesion on the left (prior stroke). General neurology evaluated and recommended no further testing and no changes to AED regimen.    - Etiology for periodic and transient neurologic changes unclear. Possible related to episodic hypoglycemia though no clear pattern.  - Seizure precautions  - Neuro-checks  - Continues on PTA statin/Brilinta/ASA  - Follow-up with primary neurologist Dr. Padilla.    - Continues on PTA keppra 750 mg BID, vimpat 100 mg BID    1. Hypoglycemic episode   2. T2DM, insulin dependent: A1C 6.3 (6/2/2020) improved from 8.3 3/2020.  [Lantus 26u QHS and metformin 1000mg BID]  - Intermittent hypoglycemia overnight noted as low as 59 despite reduction in Lantus. Given dramatic decrease in A1C will discontinue Lantus all together and monitor. Likely related to intermittent NPO while awaiting speech evaluation and more compliant diet while inpatient.  - Resume PTA Metformin.  - QID AC HS glucose monitoring and medium SSI.    Cognitive impairment: SLUMs from 5/2020 was 11/30.   - Delirium prevention protocol in place     Goals of care: POLST from march is full code. Note that Dr Morrissey brought up hospice with patient and his wife on 5/22 via tele-medicine. Wife is adamantly opposed to hospice, but would potentially be interested in palliative care as she believes this would lead to more help at home.  - Attempts by admitting provider to discuss hospice or even do-not-hospitalize with patient's wife led nowhere  - Palliative consulted, discussed extensively with wife who ultimately wants restorative care, will discuss code status and recommend exploring the option of outpatient palliative care upon discharge. For complete details of  discussion, please refer to consult note placed 6/1 by ASPEN Maldonado    Moderate oral and pharyngeal dysphagia.   - SLP consulted 6/3/2020, downgraded to DDL1 with NTL.  - SLP cares recommended at TCU.    Sinus bradycardia. - resolved.  Hx nonsustained VT.  Observed during 05/2020 admission with HR in upper 30s and low 40s--home metoprolol was held with improvement in rates. Zio patch completed after last admit--brief premature beats and atrial/ventricular runs without significant arrhythmias, no symptoms reported. Cleared to remain off BB. Note prior evaluation of syncope in 5/2019 with Ziopatch confirming NSVT, loop recorder recommended at that time, but patient and wife declined.  - HR WNL during adm.  - Discontinue telemetry.    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 Noted.     Severe CAD s/p stenting to LAD and cx (2018) with known  of the RCA  Ischemic cardiomyopathy with EF 30-35%  Hypertension, Dyslipidemia:  Follows with Dr. Samuel of Cardiology. Recently discontinued on PTA lisinopril and metoprolol in the past 1 month due to lower BPs and HR.  - Continues on PTA lipitor 40mg, brilinta BID, ASA 81mg daily.    GERD. PTA prilosec 40mg daily.    Paroxysmal atrial fibrillation. PTA not on beta blocker anymore secondary to bradycardia, not on blood thinner either    PAD   Hx right carotid endarterectomy and chronic left carotid occlusion   Hx common left iliac stenting, left SFA angioplasty, and left axillary and subclavian stenosis: Noted.   - Continue DAPT and statin as above.    Covid status: Negative on 5/1/2020.     Diet: Combination Diet Dysphagia Diet Level 1: Pureed; Nectar Thickened Liquids (pre-thickened or use instant food thickener) (By spoon)    DVT Prophylaxis: Pneumatic Compression Devices and DAPT  Lusi Catheter: not present  Code Status: Full Code         Disposition Plan   Expected discharge: discharge when safe dispo arranged.  Entered:  JoAnna K. Barthell, PA-C 06/04/2020, 10:27 AM     The patient's care was discussed with the Attending Physician, Dr. Whitaker, Bedside Nurse, and Patient.    JoAnna K. Barthell, PA-C  Hospitalist Service  [facility]  ______________________________________________________________________    Interval History   BG 62 overnight. No complaints. Upset about having to take pills with applesauce. Awaiting TCU.    Data reviewed today: I reviewed all medications, new labs and imaging results over the last 24 hours. I personally reviewed no images or EKG's today     Physical Exam   Temp: 97.6  F (36.4  C) Temp src: Oral BP: 130/54 Pulse: 76 Heart Rate: 89 Resp: 16 SpO2: 97 % O2 Device: None (Room air)    Vitals:    06/01/20 0318 06/03/20 0031 06/04/20 0234   Weight: 68 kg (150 lb) 61.7 kg (136 lb) 60.7 kg (133 lb 14.4 oz)     Constitutional: Appears stated age, no acute distress. Oriented x 2 missing year.  Respiratory: Breath sounds CTA. No increased work of breathing.  Cardiovascular: RRR, no rub or murmur. No peripheral edema.  GI: Soft, non-tender, non-distended.  Skin: Warm, dry. Pale.     Data   Recent Labs   Lab 06/04/20  0730 06/03/20  0744 06/02/20  0738 06/01/20  0336   WBC 8.6 12.9* 11.6* 8.3   HGB 10.3* 10.5* 11.9* 11.7*   MCV 91 90 89 90    193 180 226   NA  --  139 138 136   POTASSIUM  --  3.8 3.9 4.8   CHLORIDE  --  109 106 104   CO2  --  25 24 25   BUN  --  19 15 21   CR  --  0.86 0.70 0.71   ANIONGAP  --  5 8 7   ALLISON  --  8.6 8.8 9.3   GLC  --  59* 86 110*   ALBUMIN  --   --   --  3.3*   PROTTOTAL  --   --   --  6.8   BILITOTAL  --   --   --  0.6   ALKPHOS  --   --   --  125   ALT  --   --   --  28   AST  --   --   --  36     No results found for this or any previous visit (from the past 24 hour(s)).  Medications     sodium chloride 75 mL/hr at 06/01/20 2121       acetaminophen  1,000 mg Oral BID     aspirin  81 mg Oral Daily     insulin aspart  1-7 Units Subcutaneous TID AC     insulin  aspart  1-5 Units Subcutaneous At Bedtime     lacosamide  100 mg Oral BID     levETIRAcetam  750 mg Oral BID     omeprazole  40 mg Oral QAM     sennosides  2 tablet Oral Daily     tamsulosin  0.4 mg Oral Daily     ticagrelor  90 mg Oral BID

## 2020-06-04 NOTE — PLAN OF CARE
Discharge Planner SLP   Patient plan for discharge: did not state  Current status: Swallow tx completed with DD1, DD2 solids and nectar thick liquids. Mastication and oral clearance appeared generally functional with DD2 solids, though pt required cues to elicit swallow each time. Coughing with last 3/3 chewable bites. Pt did demonstrate improvements, but still not ready for upgrades at this timee.     Recommend: 1 Continue on the DDL 1 with nectar thick liquids. 2. Upright, no straws, small bites/sips and alternate liquids solids every few bites.     Barriers to return to prior living situation: Dysphagia and confusion.   Recommendations for discharge: TCU  Rationale for recommendations: Patient will need on going ST needs for dysphagia management for safe diet advancement. Patient is below his baseline.        Entered by: Mikaela Heath 06/04/2020 12:39 PM

## 2020-06-04 NOTE — PLAN OF CARE
Pt here with rule out seizures and acute on chronic weakness. A&Ox2, disoriented to situation and time. Agitated at times. Neuros intact with 4/5 generalized weakness. [place]. VSS on RA. Tele SR BBB. DD1 diet, nectar thick liquids. Takes pills crushed in pudding. BG 62 at 0230- oral glucose given. Up with A1GBW. Impulsive at times. Seizure precautions in place- no witnessed or reported seizure activity.  Redness to scrotum- barrier cream applied. Incontinent at times. Denies pain. Pt scoring yellow on the Aggression Stop Light Tool due to confusion. TCU at discharge. Referrals sent out.

## 2020-06-04 NOTE — PROGRESS NOTES
D: SW following for discharge planning.  I: Spoke with pt's significant other, Halina. Discussed discharge planning and the need for additional TCU choices. She refuses a referral to [facility] for their memory care TCU. She reports pt was there two years ago and believes he got very ill there. She agreed to referrals to [facility] and [facility] and [facility].   P: Referrals sent.     ESAU Foreman, LGSW  [phone]  [facility]

## 2020-06-05 NOTE — DISCHARGE SUMMARY
Pt AO to self, forgetful.  [place].  Neuros intact except for L eye blindness and R side partial blindness: baseline.  Denies pain.  Follows commands.  A1 GB W.  Continent of bowel and bladder.  Pt did not have any belongings, only the clothes he left in.  Pt left via EMS at 1750.

## 2020-06-05 NOTE — PLAN OF CARE
[id number]    Pt. Alert to self and place, pleasantly confused. [place]. VSS on RA. Denies pain. Neuros intact ex does have blindness in L eye and limited in R eye from macular degeneration. . Seizure precautions maintained. Plan to discharge once a TCU placment has been found (some referrals sent).

## 2020-06-05 NOTE — PLAN OF CARE
SLP: Attempted meal follow up at lunch. Pt asleep. Did not arouse. Will continue to follow.     Addendum: Reviewed wife concerns with LSW. Pt with chronic dysphagia over the last two years and is currently below baseline with generalized weakness. SLP noted that pt was not appropriate for a diet upgrade the last two days d/t overt s/sx of aspiration and delayed initiation. Would continue safest, least restrictive diet 1. DDL 1 with nectar thick liquids. 2. Upright, no straws, small bites/sips and alternate liquids solids every few bites.     Would defer discussion on diet upgrade/accepting risk of aspiration and or choking to MD.     Will continue to follow to determine appropriateness for upgrade.    Speech Language Therapy Discharge Summary    Reason for therapy discharge:    Discharged to transitional care facility.    Progress towards therapy goal(s). See goals on Care Plan in [facility] electronic health record for goal details.  Goals not met.  Barriers to achieving goals:   discharge from facility.    Therapy recommendation(s):    Continued therapy is recommended.  Rationale/Recommendations:  See above. Consider Instrumental evaluation pending progress/ability to tolerate.

## 2020-06-05 NOTE — PLAN OF CARE
Pt alert to self and place, forgetful, [place], macular degeneration. AVSS on RA. Denies pain. Tolerating DD!, nectar thick diet, pills crushed in pudding. BG checks 100/123. Seizure precautions maintained. Up A1 GB/W to BR, voiding adequately. Discharge to memory care TCU pending, referrals sent out.

## 2020-06-05 NOTE — DISCHARGE SUMMARY
[facility]  Hospitalist Discharge Summary       Date of Admission:  6/1/2020  Date of Discharge:  6/5/2020  Discharging Provider: JoAnna K. Barthell, PA-C      Discharge Diagnoses   Progressive frailty.  Acute on chronic generalized weakness.  Recurrent neurologic spells.  Hypoglycemic episode.  Moderate oral and pharyngeal dysphagia.   Sinus bradycardia.    Secondary discharge diagnoses.  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   Unspecified seizure disorder.  T2DM, insulin dependent.  Cognitive impairment.  Hearing and visual impairment.   Severe CAD s/p stenting to LAD and cx (2018) with known  of the RCA.  Ischemic cardiomyopathy with EF 30-35%.  Hypertension.  Dyslipidemia.  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   GERD.  Paroxysmal atrial fibrillation.  PAD.  Follow-ups Needed After Discharge   Follow-up Appointments     Follow Up and recommended labs and tests      Follow up with Nursing home physician.  - Stopped glargine.          Discharge Disposition   Discharged to short-term care facility  Condition at discharge: Stable    Hospital Course   Dustin Pearce is a 89 year old male who presented 6/1/2020 with recurrent neurologic spell (facial droop and slurred speech) which has resolved prior to presenting to ED and somnolence. He was admitted under observation status for further evaluation and management.     Progressive frailty.  Acute on chronic generalized weakness.  Weakness present since 5/29 noted per wife at home. He reported being weak since March 2020 discharge from TCU, however during his last admit May 2020, he was weak and it was thought some of this was related to bradycardia noted during that admission. Mild leukocytosis on adm 11.6. No sxs infection. UA unremarkable. CT A/P negative for acute pathology, extensive diverticulosis noted, 9 mm non-obstructing stone in the inferior left kidney. Lung bases clear  of acute infiltrate. No sinister arrhythmia noted on tele.  - PT recommending TCU.  - SW assisting with discharge planning.  - Medically stable for discharge, discharge has been delayed due need for memory care TCU.  - Please see SW note from 6/5. Discussed progressive decline and frailty with significant other over phone; has had poor prior experiences with hospice and adamantly opposed. Remains uncertain about resuscitative efforts and would like to discuss with patient's son therefore will remain as full code.     Neurologic spells.  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   Unspecified seizure disorder.  Slurred speech and facial droop resolved prior to arrival to ED. Multiple similar presentations in the past with negative workup for CVA. CTH in ED without acute stroke. Neurology called by ED and reported no further stroke work-up, however pt developed acute neurologic change overnight 6/2 with increasing lethargy and worsening LUE weakness. MRI brain negative 6/3/2020. Stroke neurology evaluated and thought possibly adverse reaction to Seroquel which was given for agitation.     Neurology clinic notes from mid winter indicate episodes occurring 2-3/wk and patient was recently started on antiepileptic medications out of concern for seizure activity. Keppra level therapeutic at 40. EEG with no epileptic form activity, seizure activity, mild encephalopathy with superimposed structural lesion on the left (prior stroke). General neurology evaluated and recommended no further testing and no changes to AED regimen.     - Etiology for periodic and transient neurologic changes unclear. Possible related to episodic hypoglycemia though no clear pattern.  - Seizure precautions  - Neuro-checks  - Continues on PTA statin/Brilinta/ASA  - Follow-up with primary neurologist Dr. Padilla.    - Continues on PTA keppra 750 mg BID, vimpat 100 mg BID     1. Hypoglycemic episode   2. T2DM, insulin dependent: A1C 6.3  (6/2/2020) improved from 8.3 3/2020.  [Lantus 26u QHS and metformin 1000mg BID]  - Resume PTA Metformin.  - Intermittent hypoglycemia overnight noted as low as 59 despite reduction in Lantus. Given dramatic decrease in A1C will discontinue Lantus all together and monitor. Possible related to intermittent NPO while awaiting speech evaluation and more compliant diet while inpatient, but also suspect his PO intake has declined since Lantus initiated.  - QID AC HS glucose monitoring and medium SSI.     Cognitive impairment: SLUMs from 5/2020 was 11/30.  Hearing and visual impairment.   - Did not require any PRN Seroquel during adm.  - Does not have glasses or hearing aids in hospital, this may exacerbate any confusion.     Goals of care: POLST from march is full code. Note that Dr Morrissey brought up hospice with patient and his wife on 5/22 via tele-medicine. Wife is adamantly opposed to hospice, but would potentially be interested in palliative care as she believes this would lead to more help at home.  - Attempts by admitting provider to discuss hospice or even do-not-hospitalize with patient's wife led nowhere  - Palliative consulted, discussed extensively with wife who ultimately wants restorative care, will discuss code status and recommend exploring the option of outpatient palliative care upon discharge. For complete details of discussion, please refer to consult note placed 6/1 by ASPEN Maldonado     Moderate oral and pharyngeal dysphagia.   - SLP consulted 6/3/2020, downgraded to DDL1 with NTL.  - SLP cares recommended at TCU.     Sinus bradycardia. - resolved.  Hx nonsustained VT.  Observed during 05/2020 admission with HR in upper 30s and low 40s--home metoprolol was held with improvement in rates. Zio patch completed after last admit--brief premature beats and atrial/ventricular runs without significant arrhythmias, no symptoms reported. Cleared to remain off BB. Note prior evaluation of syncope in 5/2019  with Ziopatch confirming NSVT, loop recorder recommended at that time, but patient and wife declined.  - Monitored on tele x 3 days, no events or sinister arrhythmia.     Severe CAD s/p stenting to LAD and cx (2018) with known  of the RCA  Ischemic cardiomyopathy with EF 30-35%  Hypertension, Dyslipidemia:  Follows with Dr. Samuel of Cardiology. Recently discontinued on PTA lisinopril and metoprolol in the past 1 month due to lower BPs and HR.  - Continues on PTA lipitor 40mg, brilinta BID, ASA 81mg daily.     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 Noted.      GERD. PTA prilosec 40mg daily.     Paroxysmal atrial fibrillation. PTA not on beta blocker anymore secondary to bradycardia, not on blood thinner either     PAD   Hx right carotid endarterectomy and chronic left carotid occlusion   Hx common left iliac stenting, left SFA angioplasty, and left axillary and subclavian stenosis: Noted.   - Continue DAPT and statin as above.     Covid status: Negative on 5/1/2020.    Consultations This Hospital Stay   PHYSICAL THERAPY ADULT IP CONSULT  OCCUPATIONAL THERAPY ADULT IP CONSULT  CARE TRANSITION RN/SW IP CONSULT  NEUROLOGY IP CONSULT  PALLIATIVE CARE ADULT IP CONSULT  SPEECH LANGUAGE PATH ADULT IP CONSULT  NEUROLOGY IP CONSULT  NEUROLOGY IP CONSULT  SOCIAL WORK IP CONSULT  CARE TRANSITION RN/SW IP CONSULT  NUTRITION SERVICES ADULT IP CONSULT  PHYSICAL THERAPY ADULT IP CONSULT  OCCUPATIONAL THERAPY ADULT IP CONSULT  SPEECH LANGUAGE PATH ADULT IP CONSULT    Code Status   Full Code    Time Spent on this Encounter   I, JoAnna K. Barthell, PA-C, personally saw the patient today and spent less than or equal to 30 minutes discharging this patient.     This patient was discussed with Dr. Whitaker of the Hospitalist Service who agrees with current plans as outlined above.    JoAnna K. Barthell, PA-C  [facility]  Hospital  ______________________________________________________________________  Physical Exam   Temp: 97.8  F (36.6  C) Temp src: Oral BP: (!) 147/73 Pulse: 72 Heart Rate: 89 Resp: 16 SpO2: 98 % O2 Device: None (Room air)    Vitals:    06/01/20 0318 06/03/20 0031 06/04/20 0234   Weight: 68 kg (150 lb) 61.7 kg (136 lb) 60.7 kg (133 lb 14.4 oz)   Constitutional: Appears stated age, no acute distress. Frail appearing laying flat in bed. Oriented x 2 missing year. Pleasantly confused.  Respiratory: Breath sounds CTA. No increased work of breathing.  Cardiovascular: RRR, no rub or murmur. No peripheral edema.  GI: Soft, non-tender, non-distended.  Skin: Warm, dry. Pale.       Primary Care Physician   Matt Morrissey    Discharge Orders      General info for SNF    Length of Stay Estimate: Short Term Care: Estimated # of Days <30  Condition at Discharge: Stable  Level of care:skilled   Rehabilitation Potential: Fair  Admission H&P remains valid and up-to-date: Yes  Recent Chemotherapy: N/A  Use Nursing Home Standing Orders: Yes     Mantoux instructions    Give two-step Mantoux (PPD) Per Facility Policy Yes     Reason for your hospital stay    Further evaluation and management of recurrent neurologic spell and and progressive generalized weakness.     Follow Up and recommended labs and tests    Follow up with Nursing home physician.  - Stopped glargine.     Activity - Up with nursing assistance     Full Code     Nutrition Services Adult IP Consult    Reason:  Decreased PO intake.     Physical Therapy Adult Consult    Evaluate and treat as clinically indicated.    Reason:  Deconditioning.     Occupational Therapy Adult Consult    Evaluate and treat as clinically indicated.    Reason:  Deconditioning.     Speech Language Path Adult Consult    Evaluate and treat as clinically indicated.    Reason:  Moderate oral and pharyngeal dysphagia     Fall precautions     Advance Diet as Tolerated    Follow this diet upon discharge:       Snacks/Supplements Adult: Boost Shake; Between Meals      Combination Diet Dysphagia Diet Level 1: Pureed; Nectar Thickened Liquids (pre-thickened or use instant food thickener) (By spoon)       Significant Results and Procedures   Most Recent 3 CBC's:  Recent Labs   Lab Test 06/04/20  0730 06/03/20  0744 06/02/20  0738   WBC 8.6 12.9* 11.6*   HGB 10.3* 10.5* 11.9*   MCV 91 90 89    193 180     Most Recent 3 BMP's:  Recent Labs   Lab Test 06/03/20  0744 06/02/20  0738 06/01/20  0336    138 136   POTASSIUM 3.8 3.9 4.8   CHLORIDE 109 106 104   CO2 25 24 25   BUN 19 15 21   CR 0.86 0.70 0.71   ANIONGAP 5 8 7   ALLISON 8.6 8.8 9.3   GLC 59* 86 110*     Most Recent 2 LFT's:  Recent Labs   Lab Test 06/01/20  0336 05/01/20  1005   AST 36 15   ALT 28 18   ALKPHOS 125 97   BILITOTAL 0.6 0.9     Most Recent 3 Troponin's:  Recent Labs   Lab Test 05/01/20  1005 04/19/20  0720 04/19/20  0429  05/27/18  0119   TROPI 0.016 0.034 0.035   < >  --    TROPONIN  --   --   --   --  0.00    < > = values in this interval not displayed.     Most Recent Hemoglobin A1c:  Recent Labs   Lab Test 06/01/20  1022   A1C 6.3*     Most Recent 6 glucoses:  Recent Labs   Lab Test 06/03/20  0744 06/02/20  0738 06/01/20  0336 05/04/20  0542 05/03/20  0555 05/02/20  0515   GLC 59* 86 110* 91 92 108*     Keppra 40.     Results for orders placed or performed during the hospital encounter of 06/01/20   CT Abdomen Pelvis w Contrast    Narrative    CT ABDOMEN AND PELVIS WITH CONTRAST 6/1/2020 7:22 AM     HISTORY: Weakness, recent diverticulitis.    COMPARISON: May 1, 2020    TECHNIQUE: Volumetric helical acquisition of CT images from the lung  bases through the symphysis pubis after the administration of  75 mL  Isovue-370 intravenous contrast. Radiation dose for this scan was  reduced using automated exposure control, adjustment of the mA and/or  kV according to patient size, or iterative reconstruction technique.    FINDINGS: There is extensive  diverticulosis without evidence for  diverticulitis. There are no dilated loops of small intestine or large  bowel to suggest ileus or obstruction. No abscess. There are extensive  atherosclerotic changes of the visualized aorta and its branches.  There is no evidence of aortic dissection or aneurysm. No free fluid.  No free air. There are no lymph nodes that are abnormal by size  criteria. The liver, spleen, adrenal glands, kidneys, and pancreas  demonstrate no worrisome focal lesion. Left renal cyst. 9 mm  nonobstructing stone inferior left kidney. Lung bases clear of acute  infiltrates. Survey of the visualized bony structures demonstrates no  destructive bony lesions.      Impression    IMPRESSION: No acute process demonstrated in the abdomen and pelvis.  Previously seen inflammatory changes have resolved.     ANJELICA CABRERA MD   Head CT w/o contrast    Narrative    CT OF THE HEAD WITHOUT CONTRAST 6/1/2020 7:22 AM     COMPARISON: Head CT 5/1/2020    HISTORY: Left facial droop, resolved.    TECHNIQUE: 5 mm thick axial CT images of the head were acquired  without IV contrast material.    FINDINGS:  There is moderate diffuse cerebral volume loss. There are  extensive confluent areas of decreased density in the cerebral white  matter bilaterally that are consistent with sequela of chronic small  vessel ischemic disease. A moderate-sized chronic left MCA territory  infarct involving the left frontal lobe and insula again noted.    The ventricles and basal cisterns are within normal limits in  configuration given the degree of cerebral volume loss.  There is no  midline shift. There are no extra-axial fluid collections.    No intracranial hemorrhage, mass or recent infarct.    The visualized paranasal sinuses are well-aerated. There is no  mastoiditis. There are no fractures of the visualized bones.       Impression    IMPRESSION:  Diffuse cerebral volume loss and cerebral white matter  changes consistent with chronic  small vessel ischemic disease. Chronic  left MCA territory infarct again noted. No evidence for acute  intracranial pathology.       Radiation dose for this scan was reduced using automated exposure  control, adjustment of the mA and/or kV according to patient size, or  iterative reconstruction technique.    LISANDRO MAGALLON MD   MR Brain w/o & w Contrast    Narrative    MRI BRAIN WITHOUT AND WITH CONTRAST  6/3/2020 9:08 AM     HISTORY: Neuro deficit(s), subacute. Altered level of consciousness  (LOC), unexplained.     TECHNIQUE: Multiplanar, multisequence MRI of the brain without and  with 6 mL Gadavist.     COMPARISON: CT head 6/1/2020. MRI head 4/19/2020.     FINDINGS: No abnormal intracranial restricted diffusion to suggest  acute infarct. Unchanged moderate-sized chronic left frontal lobe  encephalomalacia related to remote left middle cerebral artery  territory infarct. Unchanged multiple small right cerebellar chronic  infarcts. Moderate diffuse brain parenchymal volume loss. Moderate to  severe presumed chronic small vessel ischemic disease involving the  cerebral white matter and to a lesser degree involving the wade. No  hemorrhage, extra-axial fluid collection, or mass effect. The  ventricles are within normal limits in size and configuration without  evidence for hydrocephalus. No abnormal intracranial postcontrast  enhancement is appreciated.    Bilateral lens replacements. Orbits otherwise normal. Trace scattered  paranasal sinus mucosal thickening. Absence of the left internal  carotid artery flow void consistent with known chronic occlusion. The  other major vascular flow voids of the skull base are maintained.      Impression    IMPRESSION:  1. No acute intracranial abnormality.  2. Unchanged chronic left frontal lobe infarct and small right  cerebellar infarcts.  3. 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  4. Moderate global parenchymal volume loss and moderate to  severe  presumed chronic small vessel ischemic disease.    ANJELICA HICKEY MD       Discharge Medications   Current Discharge Medication List      CONTINUE these medications which have NOT CHANGED    Details   acetaminophen (TYLENOL) 500 MG tablet Take 1,000 mg by mouth 2 times daily Plus 1000 mg daily as needed for pain      aspirin (ASA) 81 MG EC tablet Take 1 tablet (81 mg) by mouth daily  Qty: 30 tablet, Refills: 0    Associated Diagnoses: Transient cerebral ischemia, unspecified type      atorvastatin (LIPITOR) 40 MG tablet Take 1 tablet (40 mg) by mouth daily  Qty: 30 tablet, Refills: 0    Associated Diagnoses: PAD (peripheral artery disease) (H)      DULoxetine (CYMBALTA) 30 MG capsule TAKE 1 CAPSULE(30 MG) BY MOUTH DAILY  Qty: 30 capsule, Refills: 0    Associated Diagnoses: Polyneuropathy associated with underlying disease (H)      ipratropium (ATROVENT) 0.03 % nasal spray INHALE 1 SPRAY IN EACH NOSTRIL EVERY 12 HOURS AS NEEDED  Qty: 30 mL, Refills: 11    Associated Diagnoses: Runny nose      Lacosamide (VIMPAT) 100 MG TABS tablet Take 1 tablet (100 mg) by mouth 2 times daily  Qty: 60 tablet, Refills: 0    Associated Diagnoses: Seizure (H)      levETIRAcetam (KEPPRA) 750 MG tablet Take 1 tablet (750 mg) by mouth 2 times daily  Qty: 60 tablet, Refills: 0    Associated Diagnoses: Seizure (H)      magnesium oxide (MAGNESIUM-OXIDE) 400 (241.3 Mg) MG tablet Take 1 tablet (400 mg) by mouth 2 times daily  Qty: 60 tablet, Refills: 0    Associated Diagnoses: Constipation, unspecified constipation type      metFORMIN (GLUCOPHAGE) 1000 MG tablet Take 1 tablet (1,000 mg) by mouth 2 times daily (with meals)  Qty: 60 tablet, Refills: 0    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 If symptoms persist 5 minutes after 1st dose call 911.  Qty: 25 tablet, Refills: 0     "Associated Diagnoses: Coronary artery disease involving native heart, angina presence unspecified, unspecified vessel or lesion type      omeprazole (PRILOSEC) 40 MG DR capsule TAKE 1 CAPSULE(40 MG) BY MOUTH DAILY 30 TO 60 MINUTES BEFORE A MEAL  Qty: 30 capsule, Refills: 0    Associated Diagnoses: Other acute gastritis without hemorrhage      polyethylene glycol-propylene glycol (SYSTANE ULTRA) 0.4-0.3 % SOLN ophthalmic solution Place 2 drops into both eyes daily as needed for dry eyes      sennosides (SENOKOT) 8.6 MG tablet Take 2 tablets by mouth daily Hold for loose stools      tamsulosin (FLOMAX) 0.4 MG capsule Take 1 capsule (0.4 mg) by mouth daily  Qty: 30 capsule, Refills: 0    Associated Diagnoses: Benign non-nodular prostatic hyperplasia with lower urinary tract symptoms      ticagrelor (BRILINTA) 90 MG tablet Take 1 tablet (90 mg) by mouth 2 times daily  Qty: 60 tablet, Refills: 0    Associated Diagnoses: TIA (transient ischemic attack)         STOP taking these medications       insulin glargine (BASAGLAR KWIKPEN) 100 UNIT/ML pen Comments:   Reason for Stopping:             Allergies   Allergies   Allergen Reactions     Oxycodone Other (See Comments)     \"TERRIBLE SWEATING\"     Sulfa Drugs      Tetracycline        "

## 2020-06-05 NOTE — PROGRESS NOTES
[facility] Progress Note  Chart Reviewed, Pt discussed in Interdisciplinary Rounds.   HUBER has referrals out for TCU placement    Intervention:   HUBER received voice message from admissions at [facility] that they do not have a bed for pt. HUBER contacted [facility] admissions and message left to return call.  HUBER contacted [facility] and left message with Wilmar in admissions regarding pt and requested return call. HUBER also called admission RN at [phone] and gave some referral information to RN and let her know that pt is ready for discharge. She will consult with Wilmar on bed availability and get back to HUBER. HUBER received call from [facility] that they can clinically accept pt but do not have a bed until Monday or Tuesday. HUBER asked that they keep referral in event that placement cannot be found over weekend.  HUBER returned call from GUZMAN Cobos with updates. She has requested referrals out to [facility] and [facility]  She states that pt is [place] and his hearing aides are at home and he has macular degeneration and his glasses are at home. She feels that this may be why he seems more cognitively impaired than usual.  Chandrika has requested that HUBER contact pt's son Leonidas at work today between 12:30 and 2:00pm when he is available to connect a conference call with Chandrika. [phone].    HUBER called son,Leonidas and was able to conference call with Chandrika also. They would like to get pt home as soon as possible. He dislikes hospitals and TCU's and while he may improve some with strength, they do not see that pt is going to show great changes is functioning.  Chandrika has been monitoring pt's sugars, meds, mobility and showering at home. She is 20 years younger and able to assist pt in most things at home. They are very concerned about his weight. He cannot bring himself to eat much with the diet restrictions so they ask that his diet be increased as quickly as reasonably possible. They would like to see him receive supplements; chocolate  if possible.  Bedside RN updated of requests  SW continuing to seek TCU and once pt returns home, they would like to continue to use [facility].     HUBER received call that [facility] is able to accept pt for admission today if they can get discharge orders timely.  HUBER updated pt's SO, Chandrika and she is agreeable to discharge to [facility].  HUBER contacted hospitalist to request orders  SW contacted E to request W/C transport. Chloe indicates that 5:45pm is next available transport time  SW checking to see if [facility] can take a 6:00 admission. They are able to accept pt today.  Discharge orders and summary done. Orders sent via DOD.  Bedside RN updated of possible discharge today. HUC and BB updated. Chandrika updated.  Discharge orders resent to fax [phone] per request.   HUBER emailed orders to [email or web address] per request    ..PAS-RR    D: Per DHS regulation, HUBER completed and submitted PAS-RR to [place] [employer] on [employer] Direct Connect via the Senior LinkAge Line.    PAS-RR confirmation # is : [id number]  P: Further questions may be directed to Senior LinkAge Line at #[phone], option #4 for PAS-RR staff.      Plan: Discharge to TCU via E w/c at 5:45 today..  Anticipated discharge placement: TBD. [facility] TCU.  Barriers: bed availability      ROYA Moctezuma  [facility]  [phone]

## 2020-06-08 NOTE — LETTER
To:             Please give to facility    From:   Farnaz Eddy RN, Care Coordinator   [facility] -Care Coordination  [facility]   E-mail [email or web address]   [phone]    Patient Name:  Dustin Pearce YOB: 1928   Admit date: 6/5/2020      *Information Needed:  Please contact me when the patient will discharge (or if they will move to long term care)- include the discharge date, disposition, and main diagnosis   - If the patient is discharged with home care services, please provide the name of the agency    Also- Please inform me if a care conference is being held.   Farnaz Eddy RN, Care Coordinator   [facility] -Care Coordination  [facility]   E-mail [email or web address]   [phone]                              Thank you

## 2020-06-08 NOTE — PROGRESS NOTES
Clinic Care Coordination Contact  Care Coordination Transition Communication         Clinical Data:    [facility]  Hospitalist Discharge Summary       Date of Admission:  6/1/2020  Date of Discharge:  6/5/2020  Discharging Provider: JoAnna K. Barthell, PA-C           Discharge Diagnoses     Progressive frailty.  Acute on chronic generalized weakness.  Recurrent neurologic spells.  Hypoglycemic episode.  Moderate oral and pharyngeal dysphagia.   Sinus bradycardia.      Transition to Facility:              [facility]              Contact name and phone number/fax: CC faxed contact information to the facility to call when the patient is discharged from TCU    Plan: RN/SW Care Coordinator will await notification from facility staff informing RN/SW Care Coordinator of patient's discharge plans/needs. RN/SW Care Coordinator will review chart and outreach to facility staff every 4 weeks and as needed.     [facility]     Farnaz Eddy  RN Care Coordinator   [facility] / [facility]   Phone: [phone]  Email :  [email or web address]

## 2020-06-09 PROBLEM — E16.2 HYPOGLYCEMIA: Status: ACTIVE | Noted: 2020-01-01

## 2020-06-09 PROBLEM — Z73.82 COMBINED VISUAL AND HEARING IMPAIRMENT: Status: ACTIVE | Noted: 2020-01-01

## 2020-06-09 PROBLEM — R54 FRAILTY: Status: ACTIVE | Noted: 2020-01-01

## 2020-06-09 PROBLEM — H54.7 COMBINED VISUAL AND HEARING IMPAIRMENT: Status: ACTIVE | Noted: 2020-01-01

## 2020-06-09 PROBLEM — R29.90 EPISODE OF TRANSIENT NEUROLOGIC SYMPTOMS: Status: ACTIVE | Noted: 2020-01-01

## 2020-06-09 PROBLEM — H91.90 COMBINED VISUAL AND HEARING IMPAIRMENT: Status: ACTIVE | Noted: 2020-01-01

## 2020-06-09 PROBLEM — I10 HYPERTENSION, UNSPECIFIED TYPE: Status: ACTIVE | Noted: 2019-01-01

## 2020-06-09 PROBLEM — Z86.73 HISTORY OF CVA (CEREBROVASCULAR ACCIDENT): Status: ACTIVE | Noted: 2019-01-01

## 2020-06-09 PROBLEM — H35.30 MACULAR DEGENERATION OF LEFT EYE, UNSPECIFIED TYPE: Status: ACTIVE | Noted: 2020-01-01

## 2020-06-09 PROBLEM — R13.11 ORAL PHASE DYSPHAGIA: Status: ACTIVE | Noted: 2020-01-01

## 2020-06-09 PROBLEM — I48.0 PAF (PAROXYSMAL ATRIAL FIBRILLATION) (H): Status: ACTIVE | Noted: 2020-01-01

## 2020-06-09 PROBLEM — E78.5 DYSLIPIDEMIA: Status: ACTIVE | Noted: 2020-01-01

## 2020-06-09 NOTE — PROGRESS NOTES
"[facility]  PRIMARY CARE PROVIDER AND CLINIC:  Matt Morrissey MD, [address] / [place] [address]  Chief Complaint   Patient presents with     Establish Care     [facility] Medical Record Number:  [id number]  Place of Service where encounter took place:  [facility] [[id number]]    Dustin Pearce  is a 89 year old  (1/31/1928), admitted to the above facility from  [facility]. Hospital stay 6/1/20 through 6/5/20..  Admitted to this facility for  rehab, medical management and nursing care.    HPI:    HPI information obtained from: facility chart records, facility staff, patient report and [facility] chart review.     Brief Summary of Hospital Course:pt presented with recurrent neurological \"spells\" including facial droop and slurred speech which resolved prior to arrival to the ER.  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  He was seen by neurology, no change in meds  At one pint he has acute neuro changes including increased LUE weakness and lethargy--was sent for MRI of brain which was (-). He had Seroquel at one point for agitation and it was thought that had contribute to the lethargy.  He had low blood sugars,so Lantus dc'd and not restarted. It is thought he may need LTC placement. Per SW noted on 6/5, SW d/w [facility] but she was not interested  and wanted to d/w pt's son at some point. Pt remained full code.  Noted to have worsened dysphagia so diet downgraded to a DDL1 with NTL.  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 His metoprolol and Lisinopril dc'd in the past reji by Dr Samuel, Cards: bradycardia and did not tolerate any longer..     TODAY DURING EXAM HPI and ROS: he is tired appearing. Minimally verbal, mostly yes and no answers.   No CP, SOB, Cough, dizziness, HA, N/V, dysuria or Bowel Abnormalities. Appetite is down.  No pain. Has blurry vision but not " seeing double      CODE STATUS/ADVANCE DIRECTIVES DISCUSSION:   CPR/Full code   Patient's living condition: lives alone  ALLERGIES: Oxycodone; Sulfa drugs; and Tetracycline  PAST MEDICAL HISTORY:  has a past medical history of Abdominal aortic aneurysm (H) (12/25/2016), Acute on chronic systolic congestive heart failure (H) (6/7/2018), Anemia (6/7/2018), Anemia due to blood loss, acute (6/13/2017), Aortic stenosis, Benign essential hypertension (1/6/2017), Benign non-nodular prostatic hyperplasia with lower urinary tract symptoms (10/20/2016), CAD (coronary artery disease), Carotid artery stenosis (10/20/2016), Cerebrovascular accident (H) (10/20/2016), Cognitive impairment (6/7/2018), Coronary artery disease of native artery of native heart with stable angina pectoris (H) (10/20/2016), Depression, unspecified depression type (2/22/2018), Diabetes mellitus (H), Diabetic ulcer of left foot associated with diabetes mellitus due to underlying condition (H) (6/12/2017), 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) (6/12/2017), Gastro-oesophageal reflux disease, Gastroesophageal reflux disease without esophagitis (10/20/2016), Heart attack (H), Hyperlipidemia, Hyperlipidemia, unspecified hyperlipidemia type (10/20/2016), Ischemic cardiomyopathy, Lumbar back pain (12/30/2016), Mild cognitive impairment (10/20/2016), Nephrolithiasis, NSTEMI (non-ST elevated myocardial infarction) (H) (6/7/2018), Osteopenia, PAD (peripheral artery disease) (H), Paroxysmal atrial fibrillation (H), Peripheral artery disease (H), RBBB with left anterior fascicular block, Slow transit constipation (2/22/2018), Stroke of unknown etiology (H) (12/31/2017), Syncope, TIA (transient ischemic attack) (1/20/2017), Unspecified cerebral artery occlusion with cerebral infarction, UTI (urinary tract infection) due to Enterococcus (2/22/2018), and Ventricular tachycardia (H). He also has no past medical history of Difficult intubation or Malignant  hyperthermia.  PAST SURGICAL HISTORY:   has a past surgical history that includes Cholecystectomy; hernia repair; Abdomen surgery; Laser holmium lithotripsy ureter(s), insert stent, combined (3/2/2012); rotator cuff repair rt/lt; Esophagoscopy, gastroscopy, duodenoscopy (EGD), combined (N/A, 12/26/2016); UGI ENDOSCOPY W EUS (N/A, 12/29/2016); Amputate foot (Left, 6/4/2017); Incision and drainage foot, combined (Left, 6/6/2017); and Endarterectomy carotid (Right, 1/3/2018).  FAMILY HISTORY: family history includes Breast Cancer in his mother; Heart Failure (age of onset: 81) in his father.  SOCIAL HISTORY:   reports that he quit smoking about 21 years ago. His smoking use included cigarettes. He started smoking about 51 years ago. He has a 30.00 pack-year smoking history. He has never used smokeless tobacco. He reports previous alcohol use of about 7.0 standard drinks of alcohol per week. He reports that he does not use drugs.    Post Discharge Medication Reconciliation Status: discharge medications reconciled and changed, per note/orders (see AVS)     Current Outpatient Medications   Medication Sig Dispense Refill     acetaminophen (TYLENOL) 500 MG tablet Take 1,000 mg by mouth every 8 hours as needed for pain       aspirin (ASA) 81 MG EC tablet Take 1 tablet (81 mg) by mouth daily 30 tablet 0     atorvastatin (LIPITOR) 40 MG tablet Take 1 tablet (40 mg) by mouth daily (Patient taking differently: Take 40 mg by mouth daily GIVE IN PM) 30 tablet 0     DULoxetine (CYMBALTA) 30 MG capsule TAKE 1 CAPSULE(30 MG) BY MOUTH DAILY 30 capsule 0     insulin glargine (BASAGLAR KWIKPEN) 100 UNIT/ML pen INJECT 26 UNITS SUBCUTANEOUSLY AT BEDTIME 15 mL 0     ipratropium (ATROVENT) 0.03 % nasal spray INHALE 1 SPRAY IN EACH NOSTRIL EVERY 12 HOURS AS NEEDED 30 mL 11     Lacosamide (VIMPAT) 100 MG TABS tablet Take 1 tablet (100 mg) by mouth 2 times daily . Future refills by PCP Dr. Matt Morrissey with phone number [phone]. 60  "tablet 0     levETIRAcetam (KEPPRA) 750 MG tablet Take 1 tablet (750 mg) by mouth 2 times daily 60 tablet 0     magnesium oxide (MAGNESIUM-OXIDE) 400 (241.3 Mg) MG tablet Take 1 tablet (400 mg) by mouth 2 times daily (Patient taking differently: Take 400 mg by mouth 2 times daily GIVE AT LUNCH AND SUPPER) 60 tablet 0     metFORMIN (GLUCOPHAGE) 1000 MG tablet Take 1 tablet (1,000 mg) by mouth 2 times daily (with meals) 60 tablet 0     nitroGLYcerin (NITROSTAT) 0.4 MG sublingual tablet For chest pain place 1 tablet under the tongue every 5 minutes for 3 doses. If symptoms persist 5 minutes after 1st dose call 911. 25 tablet 0     omeprazole (PRILOSEC) 40 MG DR capsule TAKE 1 CAPSULE(40 MG) BY MOUTH DAILY 30 TO 60 MINUTES BEFORE A MEAL 30 capsule 0     polyethylene glycol-propylene glycol (SYSTANE ULTRA) 0.4-0.3 % SOLN ophthalmic solution Place 2 drops into both eyes daily as needed for dry eyes       sennosides (SENOKOT) 8.6 MG tablet Take 2 tablets by mouth daily Hold for loose stools       tamsulosin (FLOMAX) 0.4 MG capsule Take 1 capsule (0.4 mg) by mouth daily 30 capsule 0     ticagrelor (BRILINTA) 90 MG tablet Take 1 tablet (90 mg) by mouth 2 times daily 60 tablet 0           ROS:  See above under HPI    Vitals:  /58   Pulse 69   Temp 98  F (36.7  C)   Resp 18   Ht 1.702 m (5' 7\")   Wt 60.2 kg (132 lb 12.8 oz)   SpO2 99%   BMI 20.80 kg/m    Exam:  GENERAL APPEARANCE:  Alert, in no distress, oriented to situation, cooperative  ENT:  Mouth and posterior oropharynx normal, moist mucous membranes, normal hearing acuity  EYES:  Conjunctivae, lids, pupils and irises normal, poor vision: blind in left and mac deg in right with limited vision  NECK:  No adenopathy,masses or thyromegaly  RESP:  respiratory effort and palpation of chest normal, lungs clear to auscultation , no respiratory distress  CV:  Palpation and auscultation of heart done , RRR, no murmur, rub, or gallop, no edema, +1 pedal pulses " bilat.  ABDOMEN:  normal bowel sounds, soft, nontender.  M/S:   FERNANDEZ equally, though ?weaker right leg--seen sitting in chair in room  SKIN:  Inspection of skin and subcutaneous tissue baseline but limited exam as pt fully dressed.  NEURO:   Cranial nerves 2-12 are normal tested and grossly at patient's baseline  PSYCH:  oriented X 3, memory impaired , affect and mood normal      Lab/Diagnostic data:  Recent Labs   Lab Test 06/03/20  0744 06/02/20  0738    138   POTASSIUM 3.8 3.9   CHLORIDE 109 106   CO2 25 24   ANIONGAP 5 8   GLC 59* 86   BUN 19 15   CR 0.86 0.70   ALLISON 8.6 8.8     CBC RESULTS:   Recent Labs   Lab Test 06/04/20  0730   WBC 8.6   RBC 3.59*   HGB 10.3*   HCT 32.5*   MCV 91   MCH 28.7   MCHC 31.7   RDW 15.5*        ASSESSMENT / PLAN:  (R54) Frailty  (primary encounter diagnosis)   (R53.81) Physical deconditioning  Comment/Plan: PT OT SLP, may need LTC     (G40.909) Seizure disorder (H)  Comment/Plan: quiescent, on two agents--Vimpat, Keppra, f/u Dr Padilla     (E11.8,  Z79.4) Type 2 diabetes mellitus with complication, with long-term current use of insulin (H)  (E16.2) Hypoglycemia  Comment/Plan: off Lantus, cont metformin, check accucks ac and prn, bmp in am 6/10.    (R29.90) Episode of transient neurologic symptoms  (Z86.73) History of TIA (transient ischemic attack) and stroke  (R13.11) Oral phase dysphagia  Comment/Plan: SLP eval also, advance diet as able.  ASA and Brilinta     (R41.89) Cognitive impairment  Comment/Plan: OT, SLP eval--seems worse on first visit, from last time he was here,    CV ISSUES:  (I10) Hypertension, unspecified type   (I25.118) Coronary artery disease of native artery of native heart with stable angina pectoris (H)   (I25.5) Ischemic cardiomyopathy   (I48.0) PAF (paroxysmal atrial fibrillation) (H)  Comment/Plan: ASA, LIPITOR, no BP or rate control meds currently, will monitor.    (H54.7,  Z73.82,  H91.90) Combined visual and hearing impairment   (H35.30)  Macular degeneration of left eye, unspecified type  Comment/Plan: fall and communication risks      Total time spent with patient visit at the skilled nursing facility was 55 including patient visit, review of past records and tried to call [facility]. with two different numbers--no answers. Greater than 50% of total time spent with counseling and coordinating care due to complexities of care.     Electronically signed by:  ASPEN Montes CNP

## 2020-06-10 NOTE — PROGRESS NOTES
"Dustin Pearce is a 89 year old male seen Naya 10, 2020 at [facility] TCU where he was admitted this month after [facility] hospitalization 6/1-5 for frailty, weakness and neurologic spells.     Today pt is seen in his room sitting edge of bed with his breakfast tray.   States \"I'm just miserable, that's why I'm here.\"   Ate a bite of his pancake, then felt like he would throw up.  Coughed but did not have any emesis.   Nursing staff reports this has been ongoing intermittently, and harder to keep his meds down.   However, after a little while patient seen eating all his breakfast.     By chart review, pt presented to ED 5/1/2020 with mental status changes, weakness and wordfinding difficulty.   He had extensive neurologic and cardiologic workup, felt to possibly be related to sz and his Vimpat dose was increased.   He went to TCU and made progress with therapies, returned home 5/15.  Just 2 weeks later he presented to ED again with MS changes, recurrent neurologic spell.  Once again, no remarkable findings on workup.     He was given quetiapine for agitation and had slurred speech and facial droop.   Brain MRI 6/3 unremarkable.   He did have some hypoglycemia during hospitalization and Lantus discontinued.   Remains on metformin.    Pt has had multiple hospital and TCU stays over the past 2-3 years, four so far in 2020.      Patient has longstanding CAD, first MI in 1970 and a stroke in 1999    He presented May 2018 with CP and NSTEMI.    He had recurrent CP and EKG changes during hospitalization and on 5/30 he underwent coronary angiogram that reveled severe 3v disease.   He had stents placed to LAD and left circumflex, was discharged on ASA and clopidogrel.    He returned to ER the next day with CP, with BNP >10, 000    He was diuresed with furosemide and Imdur dose increased.   He has subsequently been followed in Cardiology clinic, most recent ECHO below.   Beta blocker discontinued secondary to low HRs and " bps.   Ziopatch 5/4/2020 showed average HR 86 with 3 runs NSVT and one run of PSVT.       Past Medical History:   Diagnosis Date     Abdominal aortic aneurysm (H) 12/25/2016     Acute on chronic systolic congestive heart failure (H) 6/7/2018     Anemia 6/7/2018     Anemia due to blood loss, acute 6/13/2017     Aortic stenosis     mild     Benign essential hypertension 1/6/2017     Benign non-nodular prostatic hyperplasia with lower urinary tract symptoms 10/20/2016     CAD (coronary artery disease)     MI 1970     Carotid artery stenosis 10/20/2016    chronically occluded left internal carotid artery     Cerebrovascular accident (H) 10/20/2016     Cognitive impairment 6/7/2018     Coronary artery disease of native artery of native heart with stable angina pectoris (H) 10/20/2016    MI 1970     Depression, unspecified depression type 2/22/2018     Diabetes mellitus (H)      Diabetic ulcer of left foot associated with diabetes mellitus due to underlying condition (H) 6/12/2017     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     Gastro-oesophageal reflux disease      Gastroesophageal reflux disease without esophagitis 10/20/2016     Heart attack (H)      Hyperlipidemia      Hyperlipidemia, unspecified hyperlipidemia type 10/20/2016     Ischemic cardiomyopathy      Lumbar back pain 12/30/2016     Mild cognitive impairment 10/20/2016     Nephrolithiasis     RECURRENT     NSTEMI (non-ST elevated myocardial infarction) (H) 6/7/2018     Osteopenia      PAD (peripheral artery disease) (H)     peripheral angiogram 5/2017: The common iliac artery stenoses were stented and the superficial femoral artery stenoses were angioplastied     Paroxysmal atrial fibrillation (H)      Peripheral artery disease (H)     peripheral angiogram 5/2017: The common iliac artery stenoses were stented and the superficial femoral artery stenoses were angioplastied     RBBB with left anterior fascicular block      Slow transit  constipation 2/22/2018     Stroke of unknown etiology (H) 12/31/2017     Syncope      TIA (transient ischemic attack) 1/20/2017     Unspecified cerebral artery occlusion with cerebral infarction      UTI (urinary tract infection) due to Enterococcus 2/22/2018     Ventricular tachycardia (H)     nonsustained       Past Surgical History:   Procedure Laterality Date     AMPUTATE FOOT Left 6/4/2017    Procedure: AMPUTATE FOOT;  Sun Add#3: partial left foot amputation - Sagital Saw - Mini C-Arm;  Surgeon: Moe Kirk DPM;  Location:  OR     CHOLECYSTECTOMY       ENDARTERECTOMY CAROTID Right 1/3/2018    Procedure: ENDARTERECTOMY CAROTID;  RIGHT CAROTID ENDARTERECTOMY WITH EEG;  Surgeon: Roland Rodriguez MD;  Location:  OR     ESOPHAGOSCOPY, GASTROSCOPY, DUODENOSCOPY (EGD), COMBINED N/A 12/26/2016    Procedure: COMBINED ESOPHAGOSCOPY, GASTROSCOPY, DUODENOSCOPY (EGD);  Surgeon: Nasim Louis MD;  Location:  GI     GENITOURINARY SURGERY      KIDNEY STONE REMOVAL X 4     HC UGI ENDOSCOPY W EUS N/A 12/29/2016    Procedure: COMBINED ENDOSCOPIC ULTRASOUND, ESOPHAGOSCOPY, GASTROSCOPY, DUODENOSCOPY (EGD);  Surgeon: Nasim Louis MD;  Location:  GI     HERNIA REPAIR       INCISION AND DRAINAGE FOOT, COMBINED Left 6/6/2017    Procedure: COMBINED INCISION AND DRAINAGE FOOT;  REVISIONAL LEFT FOOT INCISION AND DRAINAGE WITH POSSIBLE FLAP CLOSURE , ANTIBIOTIC SOLUTION ;  Surgeon: Nabil Ann DPM;  Location:  OR     LASER HOLMIUM LITHOTRIPSY URETER(S), INSERT STENT, COMBINED  3/2/2012    Procedure:COMBINED CYSTOSCOPY, URETEROSCOPY, LASER HOLMIUM LITHOTRIPSY URETER(S), INSERT STENT; CYSTOSCOPY, RIGHT RETROGRADES, RIGHT URETEROSCOPY, HOLMIUM LASER, RIGHT STENT PLACEMENT; Surgeon:ANJELICA LEÓN; Location: OR     ROTATOR CUFF REPAIR RT/LT         Family History   Problem Relation Age of Onset     Breast Cancer Mother      Heart Failure Father 81       Social History     Tobacco  Use     Smoking status: Former Smoker     Packs/day: 1.00     Years: 30.00     Pack years: 30.00     Types: Cigarettes     Start date:      Last attempt to quit: 1999     Years since quittin.4     Smokeless tobacco: Never Used   Substance Use Topics     Alcohol use: Not Currently     Alcohol/week: 7.0 standard drinks     Types: 7 Standard drinks or equivalent per week     Comment: 1 drink per biweekly      SH:  Lives with his significant other, house in [place].   He is a  of the [employer].   Pt has a son Leonidas who lives in [place].       Review Of Systems  Skin: negative   Eyes: impaired vision, glasses.  Left eye blindness, partial blindness right eye.     Ears/Nose/Throat: hearing loss, profound  Respiratory: No shortness of breath, dyspnea on exertion, cough, or hemoptysis  Cardiovascular: as above  Gastrointestinal: dysphagia, N/V   Wt Readings from Last 5 Encounters:   20 60.2 kg (132 lb 12.8 oz)   20 60.7 kg (133 lb 14.4 oz)   20 67 kg (147 lb 12.8 oz)   20 67.9 kg (149 lb 9.6 oz)   20 65.8 kg (145 lb)    Genitourinary: negative  Musculoskeletal: ambulatory with FWW short distances, TUG 35 seconds  Neurologic: dementia, SLUMS     CPT 4.9, moderate cognitive impairment  Psychiatric: negative  Hematologic/Lymphatic/Immunologic: anemia, Fe deficiency  Endocrine: diabetes      GENERAL APPEARANCE: alert, mild distress  BP (!) 141/74   Pulse 84   Temp 97.2  F (36.2  C)   Resp 19   SpO2 96%    HEENT: normocephalic, no lesion or abnormalities; bruising around right eye  NECK: no adenopathy, no asymmetry, masses, or scars and thyroid normal to palpation  RESP: decreased BS, especially bases, no rales or wheeze  CV: regular rate and rhythm, normal S1 S2, occ ectopy, 2/6 CK  ABDOMEN:  soft, nontender, no HSM or masses and bowel sounds normal  MS: moves independently in bed, no ext edema.     SKIN: no suspicious lesions or rashes  NEURO: Normal strength and  tone, sensory exam grossly normal, and speech okay, limited history, repetitive with limited insight.   PSYCH: affect irritable  LYMPHATICS: No cervical,  or supraclavicular nodes     Last Comprehensive Metabolic Panel:  Sodium   Date Value Ref Range Status   06/03/2020 139 133 - 144 mmol/L Final     Potassium   Date Value Ref Range Status   06/03/2020 3.8 3.4 - 5.3 mmol/L Final     Chloride   Date Value Ref Range Status   06/03/2020 109 94 - 109 mmol/L Final     Carbon Dioxide   Date Value Ref Range Status   06/03/2020 25 20 - 32 mmol/L Final     Anion Gap   Date Value Ref Range Status   06/03/2020 5 3 - 14 mmol/L Final     Glucose   Date Value Ref Range Status   06/03/2020 59 (L) 70 - 99 mg/dL Final     Urea Nitrogen   Date Value Ref Range Status   06/03/2020 19 7 - 30 mg/dL Final     Creatinine   Date Value Ref Range Status   06/03/2020 0.86 0.66 - 1.25 mg/dL Final     GFR Estimate   Date Value Ref Range Status   06/03/2020 75 >60 mL/min/[1.73_m2] Final     Comment:     Non  GFR Calc  Starting 12/18/2018, serum creatinine based estimated GFR (eGFR) will be   calculated using the Chronic Kidney Disease Epidemiology Collaboration   (CKD-EPI) equation.       Calcium   Date Value Ref Range Status   06/03/2020 8.6 8.5 - 10.1 mg/dL Final     Lab Results   Component Value Date    AST 36 06/01/2020     Lab Results   Component Value Date    ALBUMIN 3.3 06/01/2020     Lab Results   Component Value Date    PROTTOTAL 6.8 06/01/2020      Lab Results   Component Value Date    ALKPHOS 125 06/01/2020     Lab Results   Component Value Date    WBC 8.6 06/04/2020      HGB 10.3 06/04/2020      MCV 91 06/04/2020       06/04/2020      ECHO 4/18/2020   Interpretation Summary  A contrast injection (Bubble Study) was performed that was negative for flow across the interatrial septum.  The visual ejection fraction is estimated at 30-35%. Left ventricular systolic function is moderately reduced.  There are regional  wall motion abnormalities as specified.  There is mild mitral stenosis.  Moderate valvular aortic stenosis.  The ascending aorta is Mildly dilated.    The study was technically difficult.    Brain MRI 6/3/2020   IMPRESSION:  1. No acute intracranial abnormality.  2. Unchanged chronic left frontal lobe infarct and small right cerebellar infarcts.  3. 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  4. Moderate global parenchymal volume loss and moderate to severe presumed chronic small vessel ischemic disease.       IMP/PLAN:    (R29.90) Episode of transient neurologic symptoms   (G40.909) Seizure disorder (H)  Comment: recurrent episodes despite anticonvulsant Rx     Plan: continue Vimpat and Keppra as per Neurology, with clinic follow up as scheduled.        (I10) Benign essential hypertension  Comment: no longer on anti-hypertensive meds  Plan: follow bps    (F01.50) Vascular dementia without behavioral disturbance (H)  (R29.6) Falls frequently  (R54) Frailty  (R53.81) Physical deconditioning  Comment: needs assist with all ADLs   Plan: PHYSICAL THERAPY / OCCUPATIONAL THERAPY for transfers, balance, gait, ADLs.   Discharge goal is return home with significant other and [facility] services.        (R11.0) Nausea  Comment: not sure why this is occurring, ?medications, AEDs     Plan: prn Zofran, give medications one pill at a time.        (I25.118) Coronary artery disease of native artery of native heart with stable angina pectoris (H)  (I35.0) Aortic valve stenosis, etiology of cardiac valve disease unspecified  (I21.4) NSTEMI (non-ST elevated myocardial infarction) (H)    Comment: s/p stent placement    Plan: ASA+ticagrelor and statin for secondary prevention.     Follow up with Cardiology       (I25.5) Ischemic cardiomyopathy  (I50.23) Acute on chronic systolic congestive heart failure (H)  Comment: stable volume status today     Plan: restart furosemide at 20 mg qod and follow weights, exam.        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     He is on daily ASA and a statin.   ACEI d/c'd secondary to hypotension.     Continue duloxetine for neuropathic symptoms       (N40.1) Benign non-nodular prostatic hyperplasia with lower urinary tract symptoms  Comment: longstanding   Plan: continue tamsulosin HS .     (D64.9) Anemia, unspecified type  (K21.9) Gastroesophageal reflux disease without esophagitis  Comment: on high dose PPI for h/o GIB   Plan: no longer on Fe, follow CBC     AD: patient continues to elect full code.  He was seen by Palliative Care 6/1 and plan is full restorative care.       Chula Barone MD

## 2020-06-10 NOTE — LETTER
"    6/10/2020        RE: Dustin Pearce  [address]  [facility] [id number]        Dustin Pearce is a 89 year old male seen Naya 10, 2020 at [facility] where he was admitted this month after [facility] hospitalization 6/1-5 for frailty, weakness and neurologic spells.     Today pt is seen in his room sitting edge of bed with his breakfast tray.   States \"I'm just miserable, that's why I'm here.\"   Ate a bite of his pancake, then felt like he would throw up.  Coughed but did not have any emesis.   Nursing staff reports this has been ongoing intermittently, and harder to keep his meds down.   However, after a little while patient seen eating all his breakfast.     By chart review, pt presented to ED 5/1/2020 with mental status changes, weakness and wordfinding difficulty.   He had extensive neurologic and cardiologic workup, felt to possibly be related to sz and his Vimpat dose was increased.   He went to TCU and made progress with therapies, returned home 5/15.  Just 2 weeks later he presented to ED again with MS changes, recurrent neurologic spell.  Once again, no remarkable findings on workup.     He was given quetiapine for agitation and had slurred speech and facial droop.   Brain MRI 6/3 unremarkable.   He did have some hypoglycemia during hospitalization and Lantus discontinued.   Remains on metformin.    Pt has had multiple hospital and TCU stays over the past 2-3 years, four so far in 2020.      Patient has longstanding CAD, first MI in 1970 and a stroke in 1999    He presented May 2018 with CP and NSTEMI.    He had recurrent CP and EKG changes during hospitalization and on 5/30 he underwent coronary angiogram that reveled severe 3v disease.   He had stents placed to LAD and left circumflex, was discharged on ASA and clopidogrel.    He returned to ER the next day with CP, with BNP >10, 000    He was diuresed with furosemide and Imdur dose increased.   He has subsequently been followed in " Cardiology clinic, most recent ECHO below.   Beta blocker discontinued secondary to low HRs and bps.   Ziopatch 5/4/2020 showed average HR 86 with 3 runs NSVT and one run of PSVT.       Past Medical History:   Diagnosis Date     Abdominal aortic aneurysm (H) 12/25/2016     Acute on chronic systolic congestive heart failure (H) 6/7/2018     Anemia 6/7/2018     Anemia due to blood loss, acute 6/13/2017     Aortic stenosis     mild     Benign essential hypertension 1/6/2017     Benign non-nodular prostatic hyperplasia with lower urinary tract symptoms 10/20/2016     CAD (coronary artery disease)     MI 1970     Carotid artery stenosis 10/20/2016    chronically occluded left internal carotid artery     Cerebrovascular accident (H) 10/20/2016     Cognitive impairment 6/7/2018     Coronary artery disease of native artery of native heart with stable angina pectoris (H) 10/20/2016    MI 1970     Depression, unspecified depression type 2/22/2018     Diabetes mellitus (H)      Diabetic ulcer of left foot associated with diabetes mellitus due to underlying condition (H) 6/12/2017     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     Gastro-oesophageal reflux disease      Gastroesophageal reflux disease without esophagitis 10/20/2016     Heart attack (H)      Hyperlipidemia      Hyperlipidemia, unspecified hyperlipidemia type 10/20/2016     Ischemic cardiomyopathy      Lumbar back pain 12/30/2016     Mild cognitive impairment 10/20/2016     Nephrolithiasis     RECURRENT     NSTEMI (non-ST elevated myocardial infarction) (H) 6/7/2018     Osteopenia      PAD (peripheral artery disease) (H)     peripheral angiogram 5/2017: The common iliac artery stenoses were stented and the superficial femoral artery stenoses were angioplastied     Paroxysmal atrial fibrillation (H)      Peripheral artery disease (H)     peripheral angiogram 5/2017: The common iliac artery stenoses were stented and the superficial femoral artery  stenoses were angioplastied     RBBB with left anterior fascicular block      Slow transit constipation 2/22/2018     Stroke of unknown etiology (H) 12/31/2017     Syncope      TIA (transient ischemic attack) 1/20/2017     Unspecified cerebral artery occlusion with cerebral infarction      UTI (urinary tract infection) due to Enterococcus 2/22/2018     Ventricular tachycardia (H)     nonsustained       Past Surgical History:   Procedure Laterality Date     AMPUTATE FOOT Left 6/4/2017    Procedure: AMPUTATE FOOT;  Sun Add#3: partial left foot amputation - Sagital Saw - Mini C-Arm;  Surgeon: Moe Kirk DPM;  Location:  OR     CHOLECYSTECTOMY       ENDARTERECTOMY CAROTID Right 1/3/2018    Procedure: ENDARTERECTOMY CAROTID;  RIGHT CAROTID ENDARTERECTOMY WITH EEG;  Surgeon: Roland Rodriguez MD;  Location:  OR     ESOPHAGOSCOPY, GASTROSCOPY, DUODENOSCOPY (EGD), COMBINED N/A 12/26/2016    Procedure: COMBINED ESOPHAGOSCOPY, GASTROSCOPY, DUODENOSCOPY (EGD);  Surgeon: Nasim Louis MD;  Location:  GI     GENITOURINARY SURGERY      KIDNEY STONE REMOVAL X 4     HC UGI ENDOSCOPY W EUS N/A 12/29/2016    Procedure: COMBINED ENDOSCOPIC ULTRASOUND, ESOPHAGOSCOPY, GASTROSCOPY, DUODENOSCOPY (EGD);  Surgeon: Nasim Louis MD;  Location:  GI     HERNIA REPAIR       INCISION AND DRAINAGE FOOT, COMBINED Left 6/6/2017    Procedure: COMBINED INCISION AND DRAINAGE FOOT;  REVISIONAL LEFT FOOT INCISION AND DRAINAGE WITH POSSIBLE FLAP CLOSURE , ANTIBIOTIC SOLUTION ;  Surgeon: Nabil Ann DPM;  Location:  OR     LASER HOLMIUM LITHOTRIPSY URETER(S), INSERT STENT, COMBINED  3/2/2012    Procedure:COMBINED CYSTOSCOPY, URETEROSCOPY, LASER HOLMIUM LITHOTRIPSY URETER(S), INSERT STENT; CYSTOSCOPY, RIGHT RETROGRADES, RIGHT URETEROSCOPY, HOLMIUM LASER, RIGHT STENT PLACEMENT; Surgeon:ANJELICA LEÓN; Location: OR     ROTATOR CUFF REPAIR RT/LT         Family History   Problem Relation Age of  Onset     Breast Cancer Mother      Heart Failure Father 81       Social History     Tobacco Use     Smoking status: Former Smoker     Packs/day: 1.00     Years: 30.00     Pack years: 30.00     Types: Cigarettes     Start date:      Last attempt to quit: 1999     Years since quittin.4     Smokeless tobacco: Never Used   Substance Use Topics     Alcohol use: Not Currently     Alcohol/week: 7.0 standard drinks     Types: 7 Standard drinks or equivalent per week     Comment: 1 drink per biweekly      SH:  Lives with his significant other, house in [place].   He is a  of the [employer].   Pt has a son Leonidas who lives in [place].       Review Of Systems  Skin: negative   Eyes: impaired vision, glasses.  Left eye blindness, partial blindness right eye.     Ears/Nose/Throat: hearing loss, profound  Respiratory: No shortness of breath, dyspnea on exertion, cough, or hemoptysis  Cardiovascular: as above  Gastrointestinal: dysphagia, N/V   Wt Readings from Last 5 Encounters:   20 60.2 kg (132 lb 12.8 oz)   20 60.7 kg (133 lb 14.4 oz)   20 67 kg (147 lb 12.8 oz)   20 67.9 kg (149 lb 9.6 oz)   20 65.8 kg (145 lb)    Genitourinary: negative  Musculoskeletal: ambulatory with FWW short distances, TUG 35 seconds  Neurologic: dementia, SLUMS     CPT 4.9, moderate cognitive impairment  Psychiatric: negative  Hematologic/Lymphatic/Immunologic: anemia, Fe deficiency  Endocrine: diabetes      GENERAL APPEARANCE: alert, mild distress  BP (!) 141/74   Pulse 84   Temp 97.2  F (36.2  C)   Resp 19   SpO2 96%    HEENT: normocephalic, no lesion or abnormalities; bruising around right eye  NECK: no adenopathy, no asymmetry, masses, or scars and thyroid normal to palpation  RESP: decreased BS, especially bases, no rales or wheeze  CV: regular rate and rhythm, normal S1 S2, occ ectopy, 2/6 CK  ABDOMEN:  soft, nontender, no HSM or masses and bowel sounds normal  MS: moves  independently in bed, no ext edema.     SKIN: no suspicious lesions or rashes  NEURO: Normal strength and tone, sensory exam grossly normal, and speech okay, limited history, repetitive with limited insight.   PSYCH: affect irritable  LYMPHATICS: No cervical,  or supraclavicular nodes     Last Comprehensive Metabolic Panel:  Sodium   Date Value Ref Range Status   06/03/2020 139 133 - 144 mmol/L Final     Potassium   Date Value Ref Range Status   06/03/2020 3.8 3.4 - 5.3 mmol/L Final     Chloride   Date Value Ref Range Status   06/03/2020 109 94 - 109 mmol/L Final     Carbon Dioxide   Date Value Ref Range Status   06/03/2020 25 20 - 32 mmol/L Final     Anion Gap   Date Value Ref Range Status   06/03/2020 5 3 - 14 mmol/L Final     Glucose   Date Value Ref Range Status   06/03/2020 59 (L) 70 - 99 mg/dL Final     Urea Nitrogen   Date Value Ref Range Status   06/03/2020 19 7 - 30 mg/dL Final     Creatinine   Date Value Ref Range Status   06/03/2020 0.86 0.66 - 1.25 mg/dL Final     GFR Estimate   Date Value Ref Range Status   06/03/2020 75 >60 mL/min/[1.73_m2] Final     Comment:     Non  GFR Calc  Starting 12/18/2018, serum creatinine based estimated GFR (eGFR) will be   calculated using the Chronic Kidney Disease Epidemiology Collaboration   (CKD-EPI) equation.       Calcium   Date Value Ref Range Status   06/03/2020 8.6 8.5 - 10.1 mg/dL Final     Lab Results   Component Value Date    AST 36 06/01/2020     Lab Results   Component Value Date    ALBUMIN 3.3 06/01/2020     Lab Results   Component Value Date    PROTTOTAL 6.8 06/01/2020      Lab Results   Component Value Date    ALKPHOS 125 06/01/2020     Lab Results   Component Value Date    WBC 8.6 06/04/2020      HGB 10.3 06/04/2020      MCV 91 06/04/2020       06/04/2020      ECHO 4/18/2020   Interpretation Summary  A contrast injection (Bubble Study) was performed that was negative for flow across the interatrial septum.  The visual ejection  fraction is estimated at 30-35%. Left ventricular systolic function is moderately reduced.  There are regional wall motion abnormalities as specified.  There is mild mitral stenosis.  Moderate valvular aortic stenosis.  The ascending aorta is Mildly dilated.    The study was technically difficult.    Brain MRI 6/3/2020   IMPRESSION:  1. No acute intracranial abnormality.  2. Unchanged chronic left frontal lobe infarct and small right cerebellar infarcts.  3. 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  4. Moderate global parenchymal volume loss and moderate to severe presumed chronic small vessel ischemic disease.       IMP/PLAN:    (R29.90) Episode of transient neurologic symptoms   (G40.909) Seizure disorder (H)  Comment: recurrent episodes despite anticonvulsant Rx     Plan: continue Vimpat and Keppra as per Neurology, with clinic follow up as scheduled.        (I10) Benign essential hypertension  Comment: no longer on anti-hypertensive meds  Plan: follow bps    (F01.50) Vascular dementia without behavioral disturbance (H)  (R29.6) Falls frequently  (R54) Frailty  (R53.81) Physical deconditioning  Comment: needs assist with all ADLs   Plan: PHYSICAL THERAPY / OCCUPATIONAL THERAPY for transfers, balance, gait, ADLs.   Discharge goal is return home with significant other and [facility] services.        (R11.0) Nausea  Comment: not sure why this is occurring, ?medications, AEDs     Plan: prn Zofran, give medications one pill at a time.        (I25.118) Coronary artery disease of native artery of native heart with stable angina pectoris (H)  (I35.0) Aortic valve stenosis, etiology of cardiac valve disease unspecified  (I21.4) NSTEMI (non-ST elevated myocardial infarction) (H)    Comment: s/p stent placement    Plan: ASA+ticagrelor and statin for secondary prevention.     Follow up with Cardiology       (I25.5) Ischemic cardiomyopathy  (I50.23) Acute on chronic systolic congestive heart failure  (H)  Comment: stable volume status today     Plan: restart furosemide at 20 mg qod and follow weights, exam.       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     He is on daily ASA and a statin.   ACEI d/c'd secondary to hypotension.     Continue duloxetine for neuropathic symptoms       (N40.1) Benign non-nodular prostatic hyperplasia with lower urinary tract symptoms  Comment: longstanding   Plan: continue tamsulosin HS .     (D64.9) Anemia, unspecified type  (K21.9) Gastroesophageal reflux disease without esophagitis  Comment: on high dose PPI for h/o GIB   Plan: no longer on Fe, follow CBC     AD: patient continues to elect full code.  He was seen by Palliative Care 6/1 and plan is full restorative care.       Chula Barone MD       Sincerely,        Chula Barone MD

## 2020-06-11 PROBLEM — S43.005A CLOSED DISLOCATION OF LEFT SHOULDER, INITIAL ENCOUNTER: Status: ACTIVE | Noted: 2020-01-01

## 2020-06-11 NOTE — PHARMACY-ADMISSION MEDICATION HISTORY
Pharmacy Medication History  Admission medication history interview status for the 6/11/2020  admission is complete. See EPIC admission navigator for prior to admission medications     Medication history sources: MAR ([place])  Medication history source reliability: Good  Adherence assessment: Good    Significant changes made to the .. medication list: His metoprolol and Lisinopril dc'd in the past reji by Dr Samuel, Cards: bradycardia and did not tolerate any longer  No longer on Basaglar,       Additional medication history information:         Medication reconciliation completed by provider prior to medication history? No    Time spent in this activity: 30 min      Prior to Admission medications    Medication Sig Last Dose Taking? Auth Provider   acetaminophen (TYLENOL) 500 MG tablet Take 1,000 mg by mouth every 8 hours as needed for pain Past Week at Unknown time Yes Reported, Patient   aspirin (ASA) 81 MG EC tablet Take 1 tablet (81 mg) by mouth daily 6/10/2020 at 0800 Yes Alexander Celestin APRN CNP   atorvastatin (LIPITOR) 40 MG tablet Take 1 tablet (40 mg) by mouth daily  Patient taking differently: Take 40 mg by mouth At Bedtime  6/10/2020 at 2000 Yes Alexander Celestin APRN CNP   DULoxetine (CYMBALTA) 30 MG capsule TAKE 1 CAPSULE(30 MG) BY MOUTH DAILY 6/10/2020 at 0800 Yes Alexander Celestin APRN CNP   ipratropium (ATROVENT) 0.03 % nasal spray INHALE 1 SPRAY IN EACH NOSTRIL EVERY 12 HOURS AS NEEDED Past Week at Unknown time Yes Matt Morrissey MD   Lacosamide (VIMPAT) 100 MG TABS tablet Take 1 tablet (100 mg) by mouth 2 times daily . Future refills by PCP Dr. Matt Morrissey with phone number [phone]. 6/10/2020 at 2000 Yes Brandyn Graves PA   levETIRAcetam (KEPPRA) 750 MG tablet Take 1 tablet (750 mg) by mouth 2 times daily 6/10/2020 at 2000 Yes Alexander Celestin APRN CNP   magnesium oxide (MAG-OX) 400 MG tablet Take 400 mg by mouth 4 times daily (with meals and  nightly) 6/10/2020 at Unknown time Yes Unknown, Entered By History   metFORMIN (GLUCOPHAGE) 1000 MG tablet Take 1 tablet (1,000 mg) by mouth 2 times daily (with meals) 6/10/2020 at 1800 Yes Alexander Celestin APRN CNP   nitroGLYcerin (NITROSTAT) 0.4 MG sublingual tablet For chest pain place 1 tablet under the tongue every 5 minutes for 3 doses. If symptoms persist 5 minutes after 1st dose call 911. Past Month at Unknown time Yes Brandyn Graves PA   omeprazole (PRILOSEC) 40 MG DR capsule TAKE 1 CAPSULE(40 MG) BY MOUTH DAILY 30 TO 60 MINUTES BEFORE A MEAL 6/10/2020 at 0700 Yes Alexander Celestin APRN CNP   polyethylene glycol-propylene glycol (SYSTANE ULTRA) 0.4-0.3 % SOLN ophthalmic solution Place 1 drop into both eyes daily as needed for dry eyes  Yes Unknown, Entered By History   polyethylene glycol-propylene glycol (SYSTANE ULTRA) 0.4-0.3 % SOLN ophthalmic solution Place 2 drops into both eyes daily  6/10/2020 at 0800 Yes Unknown, Entered By History   sennosides (SENOKOT) 8.6 MG tablet Take 2 tablets by mouth daily Hold for loose stools 6/10/2020 at 0800 Yes Reported, Patient   tamsulosin (FLOMAX) 0.4 MG capsule Take 1 capsule (0.4 mg) by mouth daily 6/10/2020 at 2000 Yes Alexander Celestin APRN CNP   ticagrelor (BRILINTA) 90 MG tablet Take 1 tablet (90 mg) by mouth 2 times daily 6/10/2020 at 2000 Yes Alexander Celestin APRN CNP

## 2020-06-11 NOTE — PLAN OF CARE
Discharge Planner PT   Patient plan for discharge: Per chart review, TCU  Current status: Pt is a 89 year old male admitted from TCU s/p fall resulting in acute left shoulder dislocation. Per chart review, plan is for patient to return to TCU in next day or two. Will defer PT evaluation to next level of care and complete orders.  Barriers to return to prior living situation: None to TCU  Recommendations for discharge: Return to TCU  Rationale for recommendations: Pt admitted from TCU and plan is to discharge to previous level of care. Will defer PT evaluation at this time.       Entered by: Portia Beckford 06/11/2020 1:20 PM

## 2020-06-11 NOTE — ED PROVIDER NOTES
History     Chief Complaint:  Fall    The history is provided by the EMS personnel. The history is limited by the condition of the patient (dementia).      Dustin Pearce is a 89 year old male, with a history of stroke, CAD, CHF, diabetes, atrial fibrillation, hypertension and hyperlipidemia, who presents to the emergency department for evaluation of a fall. EMS reports the patient was found face down on the ground at his nursing facility, [facility], with a wound to his left eyebrow and pain and dislocation to his left shoulder. EMS also notes skin tears to both upper extremities but denies any incontinence or blood from the patient's mouth. Patient was given 15 mcg Fentanyl en route, which seemed to help the pain. It is unknown if the patient lost consciousness.    Allergies:  Oxycodone  Sulfa drugs  Tetracycline     Medications:    81 mg Aspirin  Cymbalta  Lacosamide  Keppra  Metformin  Nitroglycerin  Omeprazole  Sennosides  Flomax  Brilinta    Past Medical History:    Oral phase dysphagia  Dyslipidemia  Macular degeneration  Atrial fibrillation  Diverticulitis  Stroke  Seizure disorder  CVA  Carotid endarterectomy  CAD  Hypertension  Debility  NSTEMI  Anemia  BPH  Severe sepsis  DM 2  Depression  TIA  Carotid stenosis  Peripheral artery disease  Ischemic cardiomyopathy  Aortic root dilation  Hyperlipidemia  GERD  AAA  CHF  Cognitive impairment  Heart attack  Nephrolithiasis  Ventricular tachycardia    Past Surgical History:    Left foot amputation  Cholecystectomy  Right carotid endarterectomy  Kidney stone removal x4  Hernia repair  I&D left foot  Lithotripsy  Rotator cuff repair    Family History:    Breast cancer  Heart failure    Social History:  Smoking status: Former smoker of 1.0 ppd for 30 years. Quit date: 1/1/1999  Alcohol use: Not currently  Drug use: No  The patient presents to the emergency department by himself.  PCP: Matt Morrissey  Marital Status:   [4]     Review of Systems    Unable to perform ROS: Dementia   Musculoskeletal: Positive for arthralgias (left shoulder).   Skin: Positive for wound.     Physical Exam     Patient Vitals for the past 24 hrs:   BP Temp Temp src Pulse Heart Rate Resp SpO2   06/11/20 0359 (!) 145/73 -- -- -- 96 -- --   06/11/20 0138 (!) 166/80 97.8  F (36.6  C) Oral 93 -- 14 95 %     Physical Exam  Physical Exam   General:  Sitting on bed.   HENT:  No obvious trauma to head  Right Ear:  External ear normal.   Left Ear:  External ear normal.   Nose:  Nose normal.   Eyes:  Conjunctivae and EOM are normal. Pupils are equal, round, and reactive.   Neck: Normal range of motion. Neck supple. No tracheal deviation present.   CV:  Normal heart sounds. No murmur heard.  Pulm/Chest: Effort normal and breath sounds normal.   Abd: Soft. No distension. There is no tenderness. There is no rigidity, no rebound and no guarding.   M/S: Left shoulder with gross deformity, compartment soft. Radial pulse 2+  Neuro: Alert. GCS 15.  Skin: Skin is warm and dry. No rash noted. Not diaphoretic. Multiple bruises on arms. Few small skin tears, no lacerations.  Psych: Normal mood and affect. Behavior is normal.     Emergency Department Course   ECG (1:47:12):  Rate 95 bpm. DC interval 204. QRS duration 156. QT/QTc 396/497. P-R-T axes 89 262 79. Sinus rhythm with premature atrial complexes with aberrant conduction. Right superior axis deviation. Nonspecific intraventricular block. Right ventricular hypertrophy. Abnormal ECG. No significant change when compared to EKG dated 6/1/20. Interpreted at 0158 by Amilcar Krishnan DO.    Imaging:  Radiology findings were communicated with the patient who voiced understanding of the findings.    CT Head w/o Contrast  IMPRESSION:  HEAD CT:  1.  No acute intracranial process.  2.  Moderate-sized chronic left MCA infarct.  As read by Radiology.    CT Cervical Spine w/o Contrast  IMPRESSION:  CERVICAL SPINE CT:  1.  No CT evidence for acute fracture  or post traumatic subluxation.  2.  Moderate bilateral neural foraminal narrowing at C5-C6 and on the right at C4-C5.  As read by Radiology.    XR Chest 2 Views  IMPRESSION: Inferior dislocation left humeral head relative to the glenoid cavity. No fracture evident. Degenerative changes in the spine and right shoulder. Normal heart size. Normal pulmonary vascularity. Lungs clear. No pneumothorax. Advanced   atherosclerotic vascular calcification thoracic aorta.  As read by Radiology.    XR Shoulder Left G/E 3 Views  IMPRESSION: Anterior-inferior dislocation of the left humeral head relative to the glenoid cavity. No definitive evidence for acute fracture. Left acromioclavicular joint intact. Left ribs intact. Degenerative changes in the spine. Advanced   atherosclerotic vascular calcification thoracic aorta.   As read by Radiology.    XR Pelvis 1/2 Views  IMPRESSION: No evidence for acute displaced fracture involving the pelvis. Minimal degenerative osteoarthrosis at both hips which remain intact. Degenerative changes at both SI joints and lower lumbar spine. Vascular calcification and stenting.  As read by Radiology.    Shoulder XR, left 2-w vw PORTABLE  Interval reduction of shoulder dislocation. Degenerative changes. Vascular calcification.   As read by Radiology.    Laboratory:  Laboratory findings were communicated with the patient who voiced understanding of the findings.    CBC: HGB 11.5 (L) WNL (WBC 8.3, )  BMP: Glucose 198 (H) o/w WNL (Creatinine 0.76)    Troponin I (0144): 0.038     Asymptomatic COVID-19 Virus (Coronavirus) by PCR: Pending    Procedures :    Reduction     SITE: Left Shoulder    PROCEDURE PROVIDER: Dr. Krishnan     CONSENT: Risks (including but not limited to: decreased respirations, oxygen perfusion, aspiration, hypotension), benefits, and alternatives were discussed with the patient and his wife and consent for procedure was obtained.     MONITORING: Monitoring consisted of heart  "rate, cardiac monitor, continuous pulse oximetry, continuous capnometry, frequent blood pressure checks, level of consciousness, IV access, constant attendance by RN until patient recovered, constant attendance by DO until patient stable and intubation and emergency airway management equipment available.       Sedation/anesthesia: The plan was for a full conscious sedation.  As the patient was being   set up and awaiting RT arrival, I initiated the below procedure with pts consent which was   successful; therefore, sedation was not required.    REDUCTION COMMENTS: The patient's left shoulder was held in traction and internally and externally rotated until a \"pop\" was felt along with a soft tissue manipulation to the pectoralis and trapezius muscle.  Scapular manipulation also assisted with the reduction. The patient's left shoulder then appeared reduced with improved alignment. Post reductions X-rays were obtained and showed good reduction.     PATIENT STATUS: Dislocation alignment improved post procedure and both sensation and circulation are intact. Vital signs remained stable, airway patent, and O2 saturations remained above 95%. Post-procedure, the patient was alert and responds to verbal stimuli. Patient was monitored during recovery and returned to pre-procedure baseline.      Emergency Department Course:  Past medical records, nursing notes, and vitals reviewed.  0138: I performed an exam of the patient and obtained history, as documented above.     EKG was obtained and reviewed in the emergency department.    IV inserted and blood drawn.    The patient was sent for a head and cervical spine CT, chest, shoulder and pelvis x-ray while in the emergency department, results above.     0239: I rechecked the patient. Explained findings to the patient. I attempted to reduced the patient's shoulder, without success.    0246: I discussed the patient and his findings on the phone with Chandrika, the patient's wife, who " consents to sedation and reduction of the patient's shoulder.    0402: I reduced the patient's left shoulder, as stated above, ultimately without need for sedation.    Patient sent for a shoulder x-ray to confirm reduction of the left shoulder.    I rechecked the patient. I reviewed the results with the Patient and answered all related questions prior to admission.     COVID-19 virus swab obtained in the emergency department.     0422: I discussed the patient with Dr. Segura, of the hospitalist service.    0430: I talked with the patient's wife, Chandrika, over the phone and discussed our findings and plan.    Findings and plan explained to the Patient who consents to admission. Discussed the patient with Dr. Segura, who will admit the patient to an observation bed for further monitoring, evaluation, and treatment.  Impression & Plan   Medical Decision Making:  Dustin Pearce is a very pleasant 89 year old year old patient who presents to the emergency department with concern of a fall and left shoulder pain.  The patient lives at [facility] and staff found him face down on the ground.  The patient believes he was getting up to walk to the bathroom.  The patient is on Brilinta.  He has left shoulder pain.  He was placed in a c-collar by EMS.  A CT of the head showed no intracranial bleed and a CT of the neck showed no cervical spine fracture.  The patient has evidence of a left shoulder dislocation on x-ray.  There are no apparent rib fractures or pneumothorax on chest x-ray.  Pelvis x-ray showed no pelvic or hip fracture.  Basic labs are unremarkable.  A troponin is detectable, but negative.  A screening EKG shows nonspecific changes.  The patient has no chest pain at this time.  The patient does have a history of non-sustained ventricular tachycardia.  His fall may have been related to syncope.  The patient also has a history of seizures.  He is on Keppra.  There was no urinary incontinence and no abrasion or  laceration to the tongue to suggest seizure and he was not postictal for EMS.  He was kept on the cardiac monitor and had no dysrhythmias.  The patient's left shoulder was reduced without sedation.  Postreduction x-ray showed reduction and no associated fracture.  He was placed in a shoulder immobilizer.  I spoke to the hospitalist, , who has agreed to admit the patient for continued evaluation and treatment. Updated Halina, pts spouse. She agrees.    Diagnosis:    ICD-10-CM    1. Syncope, unspecified syncope type  R55     vs mechanical fall   2. Shoulder dislocation, left, initial encounter  S43.005A      Disposition:  Admitted to Dr. Segura.    Rosana Wise  6/11/2020    EMERGENCY DEPARTMENT  Scribe Disclosure:  OLIVER, Rosana Wise, am serving as a scribe at 1:38 AM on 6/11/2020 to document services personally performed by Amilcar Krishnan DO based on my observations and the provider's statements to me.      Amilcar Krishnan DO  06/11/20 0441

## 2020-06-11 NOTE — PROGRESS NOTES
[facility]    Medicine Progress Note - Hospitalist Service       Date of Admission:  6/11/2020  Date of Service: 06/11/2020    Assessment & Plan      Dustin Pearce is a 89 year old male admitted on 6/11/2020. He presents to the emergency department after an unwitnessed fall at [facility] and is found to have acute anterior dislocation of left shoulder.    Acute left shoulder dislocation: Reduced in the emergency department by Dr. Krishnan and now in immobilizer  -Orthopedic surgery consulted ->  Recommend immobilizer and follow up in 2 wks in clinic with me.    -Physical therapy eval    Unwitnessed fall: Given unwitnessed fall, observation admission requested to evaluate for potential etiologies.  Note that patient has had multiple recent evaluations of his neurologic and cardiac status including recent ZIO patch with no significant arrhythmias completed earlier this month as well as fairly recent EEGs including in March during hospitalization and what appears to be a completed EEG from 1 June.  Significant other actually believes that fall was likely related to orthostasis.  Plan:  -Every 4 hours neurochecks as above  -Cardiac telemetry  -Compression stockings to lower extremities given patient's history of orthostasis and parkinsonism  -Orthostatics  -Neurology consulted as below    Seizure disorder: Patient unable to provide any history regarding his seizure disorders, does not believe he is ever had a seizure.  His significant other believes he may have had a seizure last week when neurology was contacted.  Completed an EEG last 6/1/2020, prior to this in March with no seizure activity caught.  No recent changes to medications  Plan:  -Keppra 750 mg twice daily ordered while awaiting pharmacy reconciliation  -Resume prior to admission Vimpat when reconciled by pharmacy.  -Seizure precautions  -Neurology consulted  -Continue PTA antiepileptic drugs Keppra and Vimpat.   -Repeat an EEG to  look for epileptiform activity    Type 2 diabetes:   Most recent hemoglobin A1c 6.3 6/1/2020.  -Medium dose sliding scale insulin as needed    Peripheral arterial disease:  Coronary artery disease with associated ischemic cardiomyopathy: Ejection fraction of 30 to 35% on most recent echocardiogram April 2020 with mild mitral and moderate aortic stenosis  History of CVA:  Carotid artery stenosis: Status post right carotid endarterectomy  Paroxysmal atrial fibrillation:  -Continue prior to admission ASA 81 mg daily  -Can resume prior to admission atorvastatin at discharge.  Held given observation admission  -Resume prior to admission Brilinta when reconciled by pharmacy  -Recently off of beta-blocker, okay to continue off per recent cardiology ZIO patch monitoring, likely in the setting of orthostasis events.       Diet: Regular Diet Adult  Room Service    DVT Prophylaxis: Pneumatic Compression Devices  Luis Catheter: in place, indication:    Code Status: Full Code           Disposition Plan   Expected discharge: 2 - 3 days, recommended to transitional care unit once safe disposition plan/ TCU bed available.  Entered: Tutu Bañuelos MD 06/11/2020, 5:53 PM       The patient's care was discussed with the Bedside Nurse and Patient.    Tutu Bañuelos MD  Hospitalist Service  [facility]    ______________________________________________________________________    Interval History     Orthostatics positive  No fevers or chills  Shoulder pain severe at times, but controlled for most part  No CP/SOB  No nausea/vomiting or abdominal pain  No new complaints otherwise    Data reviewed today: I reviewed all medications, new labs and imaging results over the last 24 hours. I personally reviewed no images or EKG's today.    Physical Exam   Vital Signs: Temp: 96.6  F (35.9  C) Temp src: Oral BP: 131/61 Pulse: 98 Heart Rate: 77 Resp: 16 SpO2: 95 % O2 Device: None (Room air)    Weight: 135 lbs 11.2 oz     General  Appearance: no apparent distress  HEENT: Normocephalic, though bruising and ecchymosis on face most notable around  right eye  Respiratory: CTABL  Cardiovascular: RRR with systolic murmur present  GI: Abdomen soft, nontender palpation.  No palpable mass.  Skin: Pallorous with scattered ecchymoses and skin tears including right wrist, left elbow.    Musculoskeletal: Moderate diffuse muscular wasting in all extremities.  Neurologic: Slight right-sided facial droop present with history of chronic CVA.  Parkinsonism features with masked face ease.   Psychiatric: Blunted affect     Data   Recent Labs   Lab 06/11/20  0144   WBC 8.3   HGB 11.5*   MCV 89         POTASSIUM 4.4   CHLORIDE 102   CO2 26   BUN 23   CR 0.76   ANIONGAP 8   ALLISON 9.3   *   TROPI 0.038     Recent Results (from the past 24 hour(s))   Head CT w/o contrast    Narrative    EXAM: CT HEAD W/O CONTRAST, CT CERVICAL SPINE W/O CONTRAST  LOCATION: [facility]  DATE/TIME: 6/11/2020 2:17 AM    INDICATION: Head trauma, minor, GCS>=13, high clinical risk, initial exam  COMPARISON: MRI 06/03/2020  TECHNIQUE:   HEAD CT: Without IV contrast. Multiplanar reformats. Dose reduction techniques were used.  CERVICAL SPINE CT: Routine without IV contrast. Multiplanar reformats. Dose reduction techniques were used.    FINDINGS:   HEAD CT:   INTRACRANIAL CONTENTS: No intracranial hemorrhage, extraaxial collection, or mass effect.  No CT evidence of acute infarct. Moderate presumed chronic small vessel ischemic changes. Moderate-sized chronic left MCA infarct small chronic cerebellar   infarcts.. Mild to moderate generalized volume loss. No hydrocephalus.     VISUALIZED ORBITS/SINUSES/MASTOIDS: No intraorbital abnormality. No paranasal sinus mucosal disease. No middle ear or mastoid effusion.    BONES/SOFT TISSUES: No acute abnormality.    CERVICAL SPINE CT:   VERTEBRA: Normal vertebral body heights and alignment. Partial ankylosis of C2-C3. No  fracture or posttraumatic subluxation.     CANAL/FORAMINA: No high-grade central canal stenosis. Moderate bilateral neural foraminal narrowing at C5-C6 and on the right at C4-C5.    PARASPINAL: No extraspinal abnormality. Visualized lung fields are clear.      Impression    IMPRESSION:  HEAD CT:  1.  No acute intracranial process.  2.  Moderate-sized chronic left MCA infarct.    CERVICAL SPINE CT:  1.  No CT evidence for acute fracture or post traumatic subluxation.  2.  Moderate bilateral neural foraminal narrowing at C5-C6 and on the right at C4-C5.   Cervical spine CT w/o contrast    Narrative    EXAM: CT HEAD W/O CONTRAST, CT CERVICAL SPINE W/O CONTRAST  LOCATION: [facility]  DATE/TIME: 6/11/2020 2:17 AM    INDICATION: Head trauma, minor, GCS>=13, high clinical risk, initial exam  COMPARISON: MRI 06/03/2020  TECHNIQUE:   HEAD CT: Without IV contrast. Multiplanar reformats. Dose reduction techniques were used.  CERVICAL SPINE CT: Routine without IV contrast. Multiplanar reformats. Dose reduction techniques were used.    FINDINGS:   HEAD CT:   INTRACRANIAL CONTENTS: No intracranial hemorrhage, extraaxial collection, or mass effect.  No CT evidence of acute infarct. Moderate presumed chronic small vessel ischemic changes. Moderate-sized chronic left MCA infarct small chronic cerebellar   infarcts.. Mild to moderate generalized volume loss. No hydrocephalus.     VISUALIZED ORBITS/SINUSES/MASTOIDS: No intraorbital abnormality. No paranasal sinus mucosal disease. No middle ear or mastoid effusion.    BONES/SOFT TISSUES: No acute abnormality.    CERVICAL SPINE CT:   VERTEBRA: Normal vertebral body heights and alignment. Partial ankylosis of C2-C3. No fracture or posttraumatic subluxation.     CANAL/FORAMINA: No high-grade central canal stenosis. Moderate bilateral neural foraminal narrowing at C5-C6 and on the right at C4-C5.    PARASPINAL: No extraspinal abnormality. Visualized lung fields are clear.       Impression    IMPRESSION:  HEAD CT:  1.  No acute intracranial process.  2.  Moderate-sized chronic left MCA infarct.    CERVICAL SPINE CT:  1.  No CT evidence for acute fracture or post traumatic subluxation.  2.  Moderate bilateral neural foraminal narrowing at C5-C6 and on the right at C4-C5.   XR Pelvis 1/2 Views    Narrative    EXAM: XR PELVIS 1/2 VW  LOCATION: [facility]  DATE/TIME: 6/11/2020 1:50 AM    INDICATION: Fall with pelvic pain  COMPARISON: None.      Impression    IMPRESSION: No evidence for acute displaced fracture involving the pelvis. Minimal degenerative osteoarthrosis at both hips which remain intact. Degenerative changes at both SI joints and lower lumbar spine. Vascular calcification and stenting.   XR Shoulder Left 2 Views    Narrative    EXAM: XR SHOULDER 2 VIEW LEFT  LOCATION: [facility]  DATE/TIME: 6/11/2020 1:50 AM    INDICATION: Fall with pain.  COMPARISON: None.      Impression    IMPRESSION: Anterior-inferior dislocation of the left humeral head relative to the glenoid cavity. No definitive evidence for acute fracture. Left acromioclavicular joint intact. Left ribs intact. Degenerative changes in the spine. Advanced   atherosclerotic vascular calcification thoracic aorta.    XR Chest 1 View    Narrative    EXAM: XR CHEST 1 VW  LOCATION: [facility]  DATE/TIME: 6/11/2020 1:50 AM    INDICATION: Fall with pain.  COMPARISON: Left shoulder radiograph 06/11/2020      Impression    IMPRESSION: Inferior dislocation left humeral head relative to the glenoid cavity. No fracture evident. Degenerative changes in the spine and right shoulder. Normal heart size. Normal pulmonary vascularity. Lungs clear. No pneumothorax. Advanced   atherosclerotic vascular calcification thoracic aorta.   Shoulder XR, left  2-3 vw PORTABLE    Narrative    EXAM: XR SHOULDER LT PORT G/E 2 VW  LOCATION: [facility]  DATE/TIME: 6/11/2020 4:12 AM    INDICATION:  Postreduction  COMPARISON: 06/11/2020      Impression    IMPRESSION: Interval reduction of shoulder dislocation. Degenerative changes. Vascular calcification.      Medications       acetaminophen  1,000 mg Oral TID     aspirin  81 mg Oral Daily     insulin aspart  1-7 Units Subcutaneous TID AC     insulin aspart  1-5 Units Subcutaneous At Bedtime     levETIRAcetam  750 mg Oral BID     sodium chloride (PF)  3 mL Intracatheter Q8H

## 2020-06-11 NOTE — CONSULTS
[facility]    Neurology Consultation     Date of Admission:  6/11/2020    Assessment & Plan   Dustin Pearce is a 89 year old male who was admitted on 6/11/2020. I was asked to see the patient following unwitnessed fall, history of seizures questionable breakthrough seizure.  Unsure if this was just a fall or a seizure.  Patient is somnolent and slightly confused/encephalopathic.  Left shoulder dislocation.  CT scan of the head is negative for acute changes.  At this time I would recommend the following:    - Continue the same antiepileptic drugs as an outpatient Keppra and Vimpat.  I would not change the dosages right now.  Keppra level is pending.  - Repeat an EEG to look for epileptiform activity  - Orthostatic blood pressure and heart rate    We will follow-up with you.    Dominga Love MD    Code Status    Full Code    Reason for Consult   Reason for consult: I was asked by Dr Segura to evaluate this patient for unwitnessed fall, history of seizures questionable breakthrough seizure.      Primary Care Physician   Matt Morrissey    Chief Complaint   unwitnessed fall, history of seizures questionable breakthrough seizure.      History is obtained from the chart, the patient is somnolent and confused unable to give pertinent history.    History of Present Illness   Dustin Pearce is a 89 year old male who was found face down on the ground at the nursing home where he lives.  He did have a wound on the left eyebrow and pain and was diagnosed with a dislocation of the left shoulder.  There was also noticed skin tears in both upper extremities.  Apparently there was no incontinence or tongue biting.  The episode was unwitnessed, unsure if the patient had a seizure or just a fall.    The patient has a possible seizure disorder and has been treated on Keppra and Vimpat was recently added.  Apparently, there is also history of orthostatic hypotension, the patient is supposed to use  compression stockings and to take his time before standing up.  This does not happen all the time.    The patient has had EEGs that did not show epileptiform activity.  He was however admitted at the beginning of the month with other spells, questionable seizures.    The patient has history of stroke in the distribution of left MCA.  Unable to get more details about the history.    Past Medical History   I have reviewed this patient's medical history and updated it with pertinent information if needed.   Past Medical History:   Diagnosis Date     Abdominal aortic aneurysm (H) 12/25/2016     Acute on chronic systolic congestive heart failure (H) 6/7/2018     Anemia 6/7/2018     Anemia due to blood loss, acute 6/13/2017     Aortic stenosis     mild     Benign essential hypertension 1/6/2017     Benign non-nodular prostatic hyperplasia with lower urinary tract symptoms 10/20/2016     CAD (coronary artery disease)     MI 1970     Carotid artery stenosis 10/20/2016    chronically occluded left internal carotid artery     Cerebrovascular accident (H) 10/20/2016     Cognitive impairment 6/7/2018     Coronary artery disease of native artery of native heart with stable angina pectoris (H) 10/20/2016    MI 1970     Depression, unspecified depression type 2/22/2018     Diabetes mellitus (H)      Diabetic ulcer of left foot associated with diabetes mellitus due to underlying condition (H) 6/12/2017     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     Gastro-oesophageal reflux disease      Gastroesophageal reflux disease without esophagitis 10/20/2016     Heart attack (H)      Hyperlipidemia      Hyperlipidemia, unspecified hyperlipidemia type 10/20/2016     Ischemic cardiomyopathy      Lumbar back pain 12/30/2016     Mild cognitive impairment 10/20/2016     Nephrolithiasis     RECURRENT     NSTEMI (non-ST elevated myocardial infarction) (H) 6/7/2018     Osteopenia      PAD (peripheral artery disease) (H)      peripheral angiogram 5/2017: The common iliac artery stenoses were stented and the superficial femoral artery stenoses were angioplastied     Paroxysmal atrial fibrillation (H)      Peripheral artery disease (H)     peripheral angiogram 5/2017: The common iliac artery stenoses were stented and the superficial femoral artery stenoses were angioplastied     RBBB with left anterior fascicular block      Slow transit constipation 2/22/2018     Stroke of unknown etiology (H) 12/31/2017     Syncope      TIA (transient ischemic attack) 1/20/2017     Unspecified cerebral artery occlusion with cerebral infarction      UTI (urinary tract infection) due to Enterococcus 2/22/2018     Ventricular tachycardia (H)     nonsustained       Past Surgical History   I have reviewed this patient's surgical history and updated it with pertinent information if needed.  Past Surgical History:   Procedure Laterality Date     AMPUTATE FOOT Left 6/4/2017    Procedure: AMPUTATE FOOT;  Sun Add#3: partial left foot amputation - Sagital Saw - Mini C-Arm;  Surgeon: Moe Kirk DPM;  Location:  OR     CHOLECYSTECTOMY       ENDARTERECTOMY CAROTID Right 1/3/2018    Procedure: ENDARTERECTOMY CAROTID;  RIGHT CAROTID ENDARTERECTOMY WITH EEG;  Surgeon: Roland Rodriguez MD;  Location:  OR     ESOPHAGOSCOPY, GASTROSCOPY, DUODENOSCOPY (EGD), COMBINED N/A 12/26/2016    Procedure: COMBINED ESOPHAGOSCOPY, GASTROSCOPY, DUODENOSCOPY (EGD);  Surgeon: Nasim Louis MD;  Location:  GI     GENITOURINARY SURGERY      KIDNEY STONE REMOVAL X 4     HC UGI ENDOSCOPY W EUS N/A 12/29/2016    Procedure: COMBINED ENDOSCOPIC ULTRASOUND, ESOPHAGOSCOPY, GASTROSCOPY, DUODENOSCOPY (EGD);  Surgeon: Nasim Louis MD;  Location:  GI     HERNIA REPAIR       INCISION AND DRAINAGE FOOT, COMBINED Left 6/6/2017    Procedure: COMBINED INCISION AND DRAINAGE FOOT;  REVISIONAL LEFT FOOT INCISION AND DRAINAGE WITH POSSIBLE FLAP CLOSURE , ANTIBIOTIC  SOLUTION ;  Surgeon: Nabil Ann DPM;  Location: [facility] OR     LASER HOLMIUM LITHOTRIPSY URETER(S), INSERT STENT, COMBINED  3/2/2012    Procedure:COMBINED CYSTOSCOPY, URETEROSCOPY, LASER HOLMIUM LITHOTRIPSY URETER(S), INSERT STENT; CYSTOSCOPY, RIGHT RETROGRADES, RIGHT URETEROSCOPY, HOLMIUM LASER, RIGHT STENT PLACEMENT; Surgeon:ANJELICA LEÓN; Location:[facility] OR     ROTATOR CUFF REPAIR RT/LT         Prior to Admission Medications   Prior to Admission Medications   Prescriptions Last Dose Informant Patient Reported? Taking?   DULoxetine (CYMBALTA) 30 MG capsule 6/10/2020 at 0800 Nursing Home No Yes   Sig: TAKE 1 CAPSULE(30 MG) BY MOUTH DAILY   Lacosamide (VIMPAT) 100 MG TABS tablet 6/10/2020 at 2000 correction No Yes   Sig: Take 1 tablet (100 mg) by mouth 2 times daily . Future refills by PCP Dr. Matt Morrissey with phone number [phone].   acetaminophen (TYLENOL) 500 MG tablet Past Week at Unknown time Nursing Home Yes Yes   Sig: Take 1,000 mg by mouth every 8 hours as needed for pain   aspirin (ASA) 81 MG EC tablet 6/10/2020 at 0800 Nursing Home No Yes   Sig: Take 1 tablet (81 mg) by mouth daily   atorvastatin (LIPITOR) 40 MG tablet 6/10/2020 at 2000 correction No Yes   Sig: Take 1 tablet (40 mg) by mouth daily   Patient taking differently: Take 40 mg by mouth At Bedtime    ipratropium (ATROVENT) 0.03 % nasal spray Past Week at Unknown time correction No Yes   Sig: INHALE 1 SPRAY IN EACH NOSTRIL EVERY 12 HOURS AS NEEDED   levETIRAcetam (KEPPRA) 750 MG tablet 6/10/2020 at 2000 correction No Yes   Sig: Take 1 tablet (750 mg) by mouth 2 times daily   magnesium oxide (MAG-OX) 400 MG tablet 6/10/2020 at Unknown time Nursing Home Yes Yes   Sig: Take 400 mg by mouth 4 times daily (with meals and nightly)   metFORMIN (GLUCOPHAGE) 1000 MG tablet 6/10/2020 at 1800 Nursing Home No Yes   Sig: Take 1 tablet (1,000 mg) by mouth 2 times daily (with meals)   nitroGLYcerin (NITROSTAT) 0.4 MG sublingual tablet  "Past Month at Unknown time [facility] No Yes   Sig: For chest pain place 1 tablet under the tongue every 5 minutes for 3 doses. If symptoms persist 5 minutes after 1st dose call 911.   omeprazole (PRILOSEC) 40 MG DR capsule 6/10/2020 at 0700 [facility] No Yes   Sig: TAKE 1 CAPSULE(40 MG) BY MOUTH DAILY 30 TO 60 MINUTES BEFORE A MEAL   polyethylene glycol-propylene glycol (SYSTANE ULTRA) 0.4-0.3 % SOLN ophthalmic solution 6/10/2020 at 0800 [facility] Yes Yes   Sig: Place 2 drops into both eyes daily    polyethylene glycol-propylene glycol (SYSTANE ULTRA) 0.4-0.3 % SOLN ophthalmic solution Past Week at Unknown time Nursing Home Yes Yes   Sig: Place 1 drop into both eyes daily as needed for dry eyes   sennosides (SENOKOT) 8.6 MG tablet 6/10/2020 at 0800 [facility] Yes Yes   Sig: Take 2 tablets by mouth daily Hold for loose stools   tamsulosin (FLOMAX) 0.4 MG capsule 6/10/2020 at 2000 [facility] No Yes   Sig: Take 1 capsule (0.4 mg) by mouth daily   ticagrelor (BRILINTA) 90 MG tablet 6/10/2020 at 2000 [facility] No Yes   Sig: Take 1 tablet (90 mg) by mouth 2 times daily      Facility-Administered Medications: None     Allergies   Allergies   Allergen Reactions     Oxycodone Other (See Comments)     \"TERRIBLE SWEATING\"     Sulfa Drugs      Tetracycline        Social History   I have reviewed this patient's social history and updated it with pertinent information if needed. Dustin B Ramses  reports that he quit smoking about 21 years ago. His smoking use included cigarettes. He started smoking about 51 years ago. He has a 30.00 pack-year smoking history. He has never used smokeless tobacco. He reports previous alcohol use of about 7.0 standard drinks of alcohol per week. He reports that he does not use drugs.    Family History   I have reviewed this patient's family history and updated it with pertinent information if needed.   Family History   Problem Relation Age of Onset     Breast Cancer Mother      Heart " Failure Father 81       Review of Systems   The 10 point Review of Systems is negative other than noted in the HPI or here.     Physical Exam   Temp: 97.2  F (36.2  C) Temp src: Oral BP: 120/47 Pulse: 98 Heart Rate: 81 Resp: 14 SpO2: 94 % O2 Device: None (Room air)    Vital Signs with Ranges  Temp:  [96.9  F (36.1  C)-97.8  F (36.6  C)] 97.2  F (36.2  C)  Pulse:  [93-98] 98  Heart Rate:  [81-96] 81  Resp:  [14-16] 14  BP: (103-166)/(32-80) 120/47  SpO2:  [94 %-98 %] 94 %  135 lbs 11.2 oz    Constitutional: normal  Eyes: Normal, no erythema.  There is ecchymoses noticed in the left corner of the eye and left cheek  ENT: Neck supple, the patient is hard of hearing  Respiratory: No shortness of breath or wheezing noticed  Skin: Pale with bruising  Musculoskeletal: No pedal edema  Neurologic: The patient is somnolent, he falls asleep without stimulation.  Does not participate much with the exam.  He is oriented to person and place.  He knew the year 2020 in the month but not the date.  He knows the name of the president.  He has decreasing hearing.  He complains of pain in the left shoulder.  Face is symmetric, eyes are conjugate.  Facial muscles are equal bilaterally, tongue protrudes midline.  The patient can lift to the right upper extremity above the shoulder there is no drift.  He moves both lower extremities equally.  There is atrophy of the muscles of on the feet and lower legs bilaterally.  The patient does not move the left upper extremity due to severe pain in the left shoulder following fall.  Neuropsychiatric: Somnolent    Data   Results for orders placed or performed during the hospital encounter of 06/11/20 (from the past 24 hour(s))   CBC with platelets differential   Result Value Ref Range    WBC 8.3 4.0 - 11.0 10e9/L    RBC Count 4.10 (L) 4.4 - 5.9 10e12/L    Hemoglobin 11.5 (L) 13.3 - 17.7 g/dL    Hematocrit 36.4 (L) 40.0 - 53.0 %    MCV 89 78 - 100 fl    MCH 28.0 26.5 - 33.0 pg    MCHC 31.6 31.5 -  36.5 g/dL    RDW 15.0 10.0 - 15.0 %    Platelet Count 267 150 - 450 10e9/L    Diff Method Automated Method     % Neutrophils 68.4 %    % Lymphocytes 24.1 %    % Monocytes 6.4 %    % Eosinophils 0.2 %    % Basophils 0.1 %    % Immature Granulocytes 0.8 %    Absolute Neutrophil 5.7 1.6 - 8.3 10e9/L    Absolute Lymphocytes 2.0 0.8 - 5.3 10e9/L    Absolute Monocytes 0.5 0.0 - 1.3 10e9/L    Absolute Eosinophils 0.0 0.0 - 0.7 10e9/L    Absolute Basophils 0.0 0.0 - 0.2 10e9/L    Abs Immature Granulocytes 0.1 0 - 0.4 10e9/L   Basic metabolic panel   Result Value Ref Range    Sodium 136 133 - 144 mmol/L    Potassium 4.4 3.4 - 5.3 mmol/L    Chloride 102 94 - 109 mmol/L    Carbon Dioxide 26 20 - 32 mmol/L    Anion Gap 8 3 - 14 mmol/L    Glucose 198 (H) 70 - 99 mg/dL    Urea Nitrogen 23 7 - 30 mg/dL    Creatinine 0.76 0.66 - 1.25 mg/dL    GFR Estimate 79 >60 mL/min/[1.73_m2]    GFR Estimate If Black >90 >60 mL/min/[1.73_m2]    Calcium 9.3 8.5 - 10.1 mg/dL   Troponin I   Result Value Ref Range    Troponin I ES 0.038 0.000 - 0.045 ug/L   EKG 12-lead, tracing only   Result Value Ref Range    Interpretation ECG Click View Image link to view waveform and result    Head CT w/o contrast    Narrative    EXAM: CT HEAD W/O CONTRAST, CT CERVICAL SPINE W/O CONTRAST  LOCATION: [facility]  DATE/TIME: 6/11/2020 2:17 AM    INDICATION: Head trauma, minor, GCS>=13, high clinical risk, initial exam  COMPARISON: MRI 06/03/2020  TECHNIQUE:   HEAD CT: Without IV contrast. Multiplanar reformats. Dose reduction techniques were used.  CERVICAL SPINE CT: Routine without IV contrast. Multiplanar reformats. Dose reduction techniques were used.    FINDINGS:   HEAD CT:   INTRACRANIAL CONTENTS: No intracranial hemorrhage, extraaxial collection, or mass effect.  No CT evidence of acute infarct. Moderate presumed chronic small vessel ischemic changes. Moderate-sized chronic left MCA infarct small chronic cerebellar   infarcts.. Mild to moderate  generalized volume loss. No hydrocephalus.     VISUALIZED ORBITS/SINUSES/MASTOIDS: No intraorbital abnormality. No paranasal sinus mucosal disease. No middle ear or mastoid effusion.    BONES/SOFT TISSUES: No acute abnormality.    CERVICAL SPINE CT:   VERTEBRA: Normal vertebral body heights and alignment. Partial ankylosis of C2-C3. No fracture or posttraumatic subluxation.     CANAL/FORAMINA: No high-grade central canal stenosis. Moderate bilateral neural foraminal narrowing at C5-C6 and on the right at C4-C5.    PARASPINAL: No extraspinal abnormality. Visualized lung fields are clear.      Impression    IMPRESSION:  HEAD CT:  1.  No acute intracranial process.  2.  Moderate-sized chronic left MCA infarct.    CERVICAL SPINE CT:  1.  No CT evidence for acute fracture or post traumatic subluxation.  2.  Moderate bilateral neural foraminal narrowing at C5-C6 and on the right at C4-C5.   Cervical spine CT w/o contrast    Narrative    EXAM: CT HEAD W/O CONTRAST, CT CERVICAL SPINE W/O CONTRAST  LOCATION: [facility]  DATE/TIME: 6/11/2020 2:17 AM    INDICATION: Head trauma, minor, GCS>=13, high clinical risk, initial exam  COMPARISON: MRI 06/03/2020  TECHNIQUE:   HEAD CT: Without IV contrast. Multiplanar reformats. Dose reduction techniques were used.  CERVICAL SPINE CT: Routine without IV contrast. Multiplanar reformats. Dose reduction techniques were used.    FINDINGS:   HEAD CT:   INTRACRANIAL CONTENTS: No intracranial hemorrhage, extraaxial collection, or mass effect.  No CT evidence of acute infarct. Moderate presumed chronic small vessel ischemic changes. Moderate-sized chronic left MCA infarct small chronic cerebellar   infarcts.. Mild to moderate generalized volume loss. No hydrocephalus.     VISUALIZED ORBITS/SINUSES/MASTOIDS: No intraorbital abnormality. No paranasal sinus mucosal disease. No middle ear or mastoid effusion.    BONES/SOFT TISSUES: No acute abnormality.    CERVICAL SPINE CT:    VERTEBRA: Normal vertebral body heights and alignment. Partial ankylosis of C2-C3. No fracture or posttraumatic subluxation.     CANAL/FORAMINA: No high-grade central canal stenosis. Moderate bilateral neural foraminal narrowing at C5-C6 and on the right at C4-C5.    PARASPINAL: No extraspinal abnormality. Visualized lung fields are clear.      Impression    IMPRESSION:  HEAD CT:  1.  No acute intracranial process.  2.  Moderate-sized chronic left MCA infarct.    CERVICAL SPINE CT:  1.  No CT evidence for acute fracture or post traumatic subluxation.  2.  Moderate bilateral neural foraminal narrowing at C5-C6 and on the right at C4-C5.   XR Pelvis 1/2 Views    Narrative    EXAM: XR PELVIS 1/2 VW  LOCATION: [facility]  DATE/TIME: 6/11/2020 1:50 AM    INDICATION: Fall with pelvic pain  COMPARISON: None.      Impression    IMPRESSION: No evidence for acute displaced fracture involving the pelvis. Minimal degenerative osteoarthrosis at both hips which remain intact. Degenerative changes at both SI joints and lower lumbar spine. Vascular calcification and stenting.   XR Shoulder Left 2 Views    Narrative    EXAM: XR SHOULDER 2 VIEW LEFT  LOCATION: [facility]  DATE/TIME: 6/11/2020 1:50 AM    INDICATION: Fall with pain.  COMPARISON: None.      Impression    IMPRESSION: Anterior-inferior dislocation of the left humeral head relative to the glenoid cavity. No definitive evidence for acute fracture. Left acromioclavicular joint intact. Left ribs intact. Degenerative changes in the spine. Advanced   atherosclerotic vascular calcification thoracic aorta.    XR Chest 1 View    Narrative    EXAM: XR CHEST 1 VW  LOCATION: [facility]  DATE/TIME: 6/11/2020 1:50 AM    INDICATION: Fall with pain.  COMPARISON: Left shoulder radiograph 06/11/2020      Impression    IMPRESSION: Inferior dislocation left humeral head relative to the glenoid cavity. No fracture evident. Degenerative changes in  the spine and right shoulder. Normal heart size. Normal pulmonary vascularity. Lungs clear. No pneumothorax. Advanced   atherosclerotic vascular calcification thoracic aorta.   Shoulder XR, left  2-3 vw PORTABLE    Narrative    EXAM: XR SHOULDER LT PORT G/E 2 VW  LOCATION: [facility]  DATE/TIME: 6/11/2020 4:12 AM    INDICATION: Postreduction  COMPARISON: 06/11/2020      Impression    IMPRESSION: Interval reduction of shoulder dislocation. Degenerative changes. Vascular calcification.    Orthopedic Surgery IP Consult: Patient to be seen: 18 hrs (trauma patient ONLY); L shoulder dislocation; Consultant may enter orders: Yes; Requesting provider? Hospitalist (if different from attending physician)    Narrative    Portia Walton PA-C     6/11/2020 10:46 AM  [facility]    Orthopedic Consultation    Dustin Pearce MRN# [id number]   Age: 89 year old YOB: 1928     Date of Admission: 6/11/2020    Reason for consult: Left shoulder dislocation        Requesting physician: Germain Segura MD       Level of consult: One-time consult to assist in determining a   diagnosis, recommend an appropriate treatment plan and place   orders           Assessment and Plan:   Assessment:   Left shoulder dislocation        Plan:   Non-operative management  Continue use of immobilizer for pain control.  Pad armpit and   watch for skin irritation.   Pain medication and ice prn   Max lift coffee cup on left until follow-up  Ok to range elbow   Follow-up with Dr Cruz at [facility] in 2-3 weeks for recheck.  Call   [phone] for appointment/questions.            Chief Complaint:   Left shoulder pain          History of Present Illness:   Dustin Pearce is a 89 year old male admitted on 6/11/2020.  He   presents to the emergency department after an unwitnessed fall at   [facility].  Patient was found by staff out of his bed,   facedown with overt left shoulder deformity.  Xrays in ED    demonstrated an acute anterior dislocation of left shoulder. The   left shoulder was reduced in the ED. Patient has a reported   seizure disorder. He does not believe he had a seizure at time of   the fall.            Past Medical History:     Past Medical History:   Diagnosis Date     Abdominal aortic aneurysm (H) 12/25/2016     Acute on chronic systolic congestive heart failure (H) 6/7/2018       Anemia 6/7/2018     Anemia due to blood loss, acute 6/13/2017     Aortic stenosis     mild     Benign essential hypertension 1/6/2017     Benign non-nodular prostatic hyperplasia with lower urinary   tract symptoms 10/20/2016     CAD (coronary artery disease)     MI 1970     Carotid artery stenosis 10/20/2016    chronically occluded left internal carotid artery     Cerebrovascular accident (H) 10/20/2016     Cognitive impairment 6/7/2018     Coronary artery disease of native artery of native heart with   stable angina pectoris (H) 10/20/2016    MI 1970     Depression, unspecified depression type 2/22/2018     Diabetes mellitus (H)      Diabetic ulcer of left foot associated with diabetes mellitus   due to underlying condition (H) 6/12/2017     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     Gastro-oesophageal reflux disease      Gastroesophageal reflux disease without esophagitis 10/20/2016     Heart attack (H)      Hyperlipidemia      Hyperlipidemia, unspecified hyperlipidemia type 10/20/2016     Ischemic cardiomyopathy      Lumbar back pain 12/30/2016     Mild cognitive impairment 10/20/2016     Nephrolithiasis     RECURRENT     NSTEMI (non-ST elevated myocardial infarction) (H) 6/7/2018     Osteopenia      PAD (peripheral artery disease) (H)     peripheral angiogram 5/2017: The common iliac artery stenoses   were stented and the superficial femoral artery stenoses were   angioplastied     Paroxysmal atrial fibrillation (H)      Peripheral artery disease (H)     peripheral angiogram 5/2017: The common  iliac artery stenoses   were stented and the superficial femoral artery stenoses were   angioplastied     RBBB with left anterior fascicular block      Slow transit constipation 2/22/2018     Stroke of unknown etiology (H) 12/31/2017     Syncope      TIA (transient ischemic attack) 1/20/2017     Unspecified cerebral artery occlusion with cerebral infarction        UTI (urinary tract infection) due to Enterococcus 2/22/2018     Ventricular tachycardia (H)     nonsustained             Past Surgical History:     Past Surgical History:   Procedure Laterality Date     AMPUTATE FOOT Left 6/4/2017    Procedure: AMPUTATE FOOT;  Sun Add#3: partial left foot   amputation - Sagital Saw - Mini C-Arm;  Surgeon: Moe Kirk DPM;  Location:  OR     CHOLECYSTECTOMY       ENDARTERECTOMY CAROTID Right 1/3/2018    Procedure: ENDARTERECTOMY CAROTID;  RIGHT CAROTID ENDARTERECTOMY   WITH EEG;  Surgeon: Roland Rodriguez MD;  Location:  OR     ESOPHAGOSCOPY, GASTROSCOPY, DUODENOSCOPY (EGD), COMBINED N/A   12/26/2016    Procedure: COMBINED ESOPHAGOSCOPY, GASTROSCOPY, DUODENOSCOPY   (EGD);  Surgeon: Nasim Louis MD;  Location:  GI     GENITOURINARY SURGERY      KIDNEY STONE REMOVAL X 4     HC UGI ENDOSCOPY W EUS N/A 12/29/2016    Procedure: COMBINED ENDOSCOPIC ULTRASOUND, ESOPHAGOSCOPY,   GASTROSCOPY, DUODENOSCOPY (EGD);  Surgeon: Nasim Louis MD;  Location:  GI     HERNIA REPAIR       INCISION AND DRAINAGE FOOT, COMBINED Left 6/6/2017    Procedure: COMBINED INCISION AND DRAINAGE FOOT;  REVISIONAL LEFT   FOOT INCISION AND DRAINAGE WITH POSSIBLE FLAP CLOSURE ,   ANTIBIOTIC SOLUTION ;  Surgeon: Nabil Ann DPM;    Location:  OR     LASER HOLMIUM LITHOTRIPSY URETER(S), INSERT STENT, COMBINED    3/2/2012    Procedure:COMBINED CYSTOSCOPY, URETEROSCOPY, LASER HOLMIUM   LITHOTRIPSY URETER(S), INSERT STENT; CYSTOSCOPY, RIGHT   RETROGRADES, RIGHT URETEROSCOPY, HOLMIUM LASER, RIGHT STENT  "  PLACEMENT; Surgeon:ANJELICA LEÓN; Location:[facility] OR     ROTATOR CUFF REPAIR RT/LT               Social History:     Social History     Tobacco Use     Smoking status: Former Smoker     Packs/day: 1.00     Years: 30.00     Pack years: 30.00     Types: Cigarettes     Start date:      Last attempt to quit: 1999     Years since quittin.4     Smokeless tobacco: Never Used   Substance Use Topics     Alcohol use: Not Currently     Alcohol/week: 7.0 standard drinks     Types: 7 Standard drinks or equivalent per week     Comment: 1 drink per biweekly             Family History:     Family History   Problem Relation Age of Onset     Breast Cancer Mother      Heart Failure Father 81             Immunizations:     VACCINE/DOSE   Diptheria   DPT   DTAP   HBIG   Hepatitis A   Hepatitis B   HIB   Influenza   Measles   Meningococcal   MMR   Mumps   Pneumococcal   Polio   Rubella   Small Pox   TDAP   Varicella   Zoster             Allergies:     Allergies   Allergen Reactions     Oxycodone Other (See Comments)     \"TERRIBLE SWEATING\"     Sulfa Drugs      Tetracycline              Medications:     Current Facility-Administered Medications   Medication     acetaminophen (TYLENOL) Suppository 650 mg     acetaminophen (TYLENOL) tablet 1,000 mg     acetaminophen (TYLENOL) tablet 500 mg     aspirin EC tablet 81 mg     glucose gel 15-30 g    Or     dextrose 50 % injection 25-50 mL    Or     glucagon injection 1 mg     HYDROmorphone (PF) (DILAUDID) injection 0.2 mg     insulin aspart (NovoLOG) injection (RAPID ACTING)     insulin aspart (NovoLOG) injection (RAPID ACTING)     levETIRAcetam (KEPPRA) tablet 750 mg     lidocaine (LMX4) cream     lidocaine (LMX4) cream     lidocaine 1 % 0.1-1 mL     naloxone (NARCAN) injection 0.1-0.4 mg     ondansetron (ZOFRAN-ODT) ODT tab 4 mg    Or     ondansetron (ZOFRAN) injection 4 mg     oxyCODONE IR (ROXICODONE) half-tab 2.5 mg     prochlorperazine (COMPAZINE) injection 5 mg    Or     " "prochlorperazine (COMPAZINE) tablet 5 mg    Or     prochlorperazine (COMPAZINE) Suppository 12.5 mg     sodium chloride (PF) 0.9% PF flush 3 mL     sodium chloride (PF) 0.9% PF flush 3 mL             Review of Systems:   ROS:  10 point ROS neg other than the symptoms noted above in the   HPI.          Physical Exam:   All vitals have been reviewed  Patient Vitals for the past 24 hrs:   BP Temp Temp src Pulse Heart Rate Resp SpO2 Height Weight   06/11/20 0555 (!) 147/67 96.9  F (36.1  C) Oral 98 -- 16 96 %   1.651 m (5' 5\") 61.6 kg (135 lb 11.2 oz)   06/11/20 0530 117/66 -- -- 98 -- -- 96 % -- --   06/11/20 0515 -- -- -- -- -- -- 96 % -- --   06/11/20 0500 119/58 -- -- 94 -- -- 97 % -- --   06/11/20 0445 -- -- -- -- -- -- 97 % -- --   06/11/20 0430 123/65 -- -- 95 -- -- 98 % -- --   06/11/20 0359 (!) 145/73 -- -- -- 96 -- -- -- --   06/11/20 0138 (!) 166/80 97.8  F (36.6  C) Oral 93 -- 14 95 % -- --     No intake or output data in the 24 hours ending 06/11/20 0945      Physical Exam   Temp: 96.9  F (36.1  C) Temp src: Oral BP: (!) 147/67 Pulse: 98   Heart Rate: 96 Resp: 16 SpO2: 96 % O2 Device: None (Room air)    Vital Signs with Ranges  Temp:  [96.9  F (36.1  C)-97.8  F (36.6  C)] 96.9  F (36.1  C)  Pulse:  [93-98] 98  Heart Rate:  [96] 96  Resp:  [14-16] 16  BP: (117-166)/(58-80) 147/67  SpO2:  [95 %-98 %] 96 %  135 lbs 11.2 oz    Constitutional: Quite sleepy t/o exam    On physical exam of the left shoulder  Immobilizer in place   Skin: intact, no ecchymosis or erythema  Swelling: mild, no significant joint effusion  Alignment: neutral  Tenderness to palpation mild global tenderness surrounding left   shoulder  ROM: Able to range left wrist and fingers without difficulty.    Able to abduct left thumb.   Able to  on left vs right  Distal pulses are intact and equal bilaterally  Sensation to light touch intact and equal bilaterally.           Data:   All laboratory data reviewed  Results for orders placed or " performed during the hospital   encounter of 06/11/20   Head CT w/o contrast     Status: None    Narrative    EXAM: CT HEAD W/O CONTRAST, CT CERVICAL SPINE W/O CONTRAST  LOCATION: [facility]  DATE/TIME: 6/11/2020 2:17 AM    INDICATION: Head trauma, minor, GCS>=13, high clinical risk,   initial exam  COMPARISON: MRI 06/03/2020  TECHNIQUE:   HEAD CT: Without IV contrast. Multiplanar reformats. Dose   reduction techniques were used.  CERVICAL SPINE CT: Routine without IV contrast. Multiplanar   reformats. Dose reduction techniques were used.    FINDINGS:   HEAD CT:   INTRACRANIAL CONTENTS: No intracranial hemorrhage, extraaxial   collection, or mass effect.  No CT evidence of acute infarct.   Moderate presumed chronic small vessel ischemic changes.   Moderate-sized chronic left MCA infarct small chronic cerebellar   infarcts.. Mild to moderate generalized volume loss. No   hydrocephalus.     VISUALIZED ORBITS/SINUSES/MASTOIDS: No intraorbital abnormality.   No paranasal sinus mucosal disease. No middle ear or mastoid   effusion.    BONES/SOFT TISSUES: No acute abnormality.    CERVICAL SPINE CT:   VERTEBRA: Normal vertebral body heights and alignment. Partial   ankylosis of C2-C3. No fracture or posttraumatic subluxation.     CANAL/FORAMINA: No high-grade central canal stenosis. Moderate   bilateral neural foraminal narrowing at C5-C6 and on the right at   C4-C5.    PARASPINAL: No extraspinal abnormality. Visualized lung fields   are clear.      Impression    IMPRESSION:  HEAD CT:  1.  No acute intracranial process.  2.  Moderate-sized chronic left MCA infarct.    CERVICAL SPINE CT:  1.  No CT evidence for acute fracture or post traumatic   subluxation.  2.  Moderate bilateral neural foraminal narrowing at C5-C6 and on   the right at C4-C5.   Cervical spine CT w/o contrast     Status: None    Narrative    EXAM: CT HEAD W/O CONTRAST, CT CERVICAL SPINE W/O CONTRAST  LOCATION: [facility]  DATE/TIME: 6/11/2020 2:17 AM    INDICATION: Head trauma, minor, GCS>=13, high clinical risk,   initial exam  COMPARISON: MRI 06/03/2020  TECHNIQUE:   HEAD CT: Without IV contrast. Multiplanar reformats. Dose   reduction techniques were used.  CERVICAL SPINE CT: Routine without IV contrast. Multiplanar   reformats. Dose reduction techniques were used.    FINDINGS:   HEAD CT:   INTRACRANIAL CONTENTS: No intracranial hemorrhage, extraaxial   collection, or mass effect.  No CT evidence of acute infarct.   Moderate presumed chronic small vessel ischemic changes.   Moderate-sized chronic left MCA infarct small chronic cerebellar   infarcts.. Mild to moderate generalized volume loss. No   hydrocephalus.     VISUALIZED ORBITS/SINUSES/MASTOIDS: No intraorbital abnormality.   No paranasal sinus mucosal disease. No middle ear or mastoid   effusion.    BONES/SOFT TISSUES: No acute abnormality.    CERVICAL SPINE CT:   VERTEBRA: Normal vertebral body heights and alignment. Partial   ankylosis of C2-C3. No fracture or posttraumatic subluxation.     CANAL/FORAMINA: No high-grade central canal stenosis. Moderate   bilateral neural foraminal narrowing at C5-C6 and on the right at   C4-C5.    PARASPINAL: No extraspinal abnormality. Visualized lung fields   are clear.      Impression    IMPRESSION:  HEAD CT:  1.  No acute intracranial process.  2.  Moderate-sized chronic left MCA infarct.    CERVICAL SPINE CT:  1.  No CT evidence for acute fracture or post traumatic   subluxation.  2.  Moderate bilateral neural foraminal narrowing at C5-C6 and on   the right at C4-C5.   XR Pelvis 1/2 Views     Status: None    Narrative    EXAM: XR PELVIS 1/2 VW  LOCATION: [facility]  DATE/TIME: 6/11/2020 1:50 AM    INDICATION: Fall with pelvic pain  COMPARISON: None.      Impression    IMPRESSION: No evidence for acute displaced fracture involving   the pelvis. 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 Degenerative changes at both SI joints and   lower lumbar spine. Vascular calcification and stenting.   XR Shoulder Left 2 Views     Status: None    Narrative    EXAM: XR SHOULDER 2 VIEW LEFT  LOCATION: [facility]  DATE/TIME: 6/11/2020 1:50 AM    INDICATION: Fall with pain.  COMPARISON: None.      Impression    IMPRESSION: Anterior-inferior dislocation of the left humeral   head relative to the glenoid cavity. No definitive evidence for   acute fracture. Left acromioclavicular joint intact. Left ribs   intact. Degenerative changes in the spine. Advanced   atherosclerotic vascular calcification thoracic aorta.    XR Chest 1 View     Status: None    Narrative    EXAM: XR CHEST 1 VW  LOCATION: [facility]  DATE/TIME: 6/11/2020 1:50 AM    INDICATION: Fall with pain.  COMPARISON: Left shoulder radiograph 06/11/2020      Impression    IMPRESSION: Inferior dislocation left humeral head relative to   the glenoid cavity. No fracture evident. Degenerative changes in   the spine and right shoulder. Normal heart size. Normal pulmonary   vascularity. Lungs clear. No pneumothorax. Advanced   atherosclerotic vascular calcification thoracic aorta.   Shoulder XR, left  2-3 vw PORTABLE     Status: None    Narrative    EXAM: XR SHOULDER LT PORT G/E 2 VW  LOCATION: [facility]  DATE/TIME: 6/11/2020 4:12 AM    INDICATION: Postreduction  COMPARISON: 06/11/2020      Impression    IMPRESSION: Interval reduction of shoulder dislocation.   Degenerative changes. Vascular calcification.    CBC with platelets differential     Status: Abnormal   Result Value Ref Range    WBC 8.3 4.0 - 11.0 10e9/L    RBC Count 4.10 (L) 4.4 - 5.9 10e12/L    Hemoglobin 11.5 (L) 13.3 - 17.7 g/dL    Hematocrit 36.4 (L) 40.0 - 53.0 %    MCV 89 78 - 100 fl    MCH 28.0 26.5 - 33.0 pg    MCHC 31.6 31.5 - 36.5 g/dL    RDW 15.0 10.0 - 15.0 %    Platelet Count 267 150 - 450 10e9/L    Diff Method Automated Method     %  Neutrophils 68.4 %    % Lymphocytes 24.1 %    % Monocytes 6.4 %    % Eosinophils 0.2 %    % Basophils 0.1 %    % Immature Granulocytes 0.8 %    Absolute Neutrophil 5.7 1.6 - 8.3 10e9/L    Absolute Lymphocytes 2.0 0.8 - 5.3 10e9/L    Absolute Monocytes 0.5 0.0 - 1.3 10e9/L    Absolute Eosinophils 0.0 0.0 - 0.7 10e9/L    Absolute Basophils 0.0 0.0 - 0.2 10e9/L    Abs Immature Granulocytes 0.1 0 - 0.4 10e9/L   Basic metabolic panel     Status: Abnormal   Result Value Ref Range    Sodium 136 133 - 144 mmol/L    Potassium 4.4 3.4 - 5.3 mmol/L    Chloride 102 94 - 109 mmol/L    Carbon Dioxide 26 20 - 32 mmol/L    Anion Gap 8 3 - 14 mmol/L    Glucose 198 (H) 70 - 99 mg/dL    Urea Nitrogen 23 7 - 30 mg/dL    Creatinine 0.76 0.66 - 1.25 mg/dL    GFR Estimate 79 >60 mL/min/[1.73_m2]    GFR Estimate If Black >90 >60 mL/min/[1.73_m2]    Calcium 9.3 8.5 - 10.1 mg/dL   Troponin I     Status: None   Result Value Ref Range    Troponin I ES 0.038 0.000 - 0.045 ug/L   Glucose by meter     Status: Abnormal   Result Value Ref Range    Glucose 185 (H) 70 - 99 mg/dL   EKG 12-lead, tracing only     Status: None   Result Value Ref Range    Interpretation ECG Click View Image link to view waveform and   result           Attestation:  I have reviewed today's vital signs, notes, medications, labs and   imaging with Dr. Cruz.  Amount of time performed on this consult: 30 minutes.    Portia Walton PA-C        Glucose by meter   Result Value Ref Range    Glucose 185 (H) 70 - 99 mg/dL   Glucose by meter   Result Value Ref Range    Glucose 158 (H) 70 - 99 mg/dL     CT scan of the head does not show any acute changes, atrophy and small vessel ischemic disease.  Films were reviewed by me.     This is a telemedicine visit that was performed with the originating site at patient's hospital bed and the distant side at [facility] neurology, in [place].  Verbal consent to participate in video visit was obtained.  This visit occurred  during the coronavirus (COVID-19)  public health emergency.  I discussed with the patient the nature of our telemedicine visits, that:  - I would evaluate the patient and recommend diagnostics and treatments based on my assessment.   - Our sessions are not being recorded and that personal health information is protected.    - Our team would provide follow-up care in person if and when the patient need it.  Patient identification was verified before the start of the encounter.

## 2020-06-11 NOTE — ED NOTES
Bed: ED26  Expected date: 6/11/20  Expected time: 1:25 AM  Means of arrival: Ambulance  Comments:  Redd [phone] [id number] dislocated shoulder

## 2020-06-11 NOTE — ED NOTES
Patient called and stated he wants to go home. I explained to the patient why he could not leave and that we had to fix his shoulder.

## 2020-06-11 NOTE — CONSULTS
[facility]    Orthopedic Consultation    Dustin Pearce MRN# [id number]   Age: 89 year old YOB: 1928     Date of Admission: 6/11/2020    Reason for consult: Left shoulder dislocation        Requesting physician: Germain Segura MD       Level of consult: One-time consult to assist in determining a diagnosis, recommend an appropriate treatment plan and place orders           Assessment and Plan:   Assessment:   Left shoulder dislocation        Plan:   Non-operative management  Continue use of immobilizer for pain control.  Pad armpit and watch for skin irritation.   Pain medication and ice prn   Max lift coffee cup on left until follow-up  Ok to range elbow   Follow-up with Dr Cruz at [facility] in 2-3 weeks for recheck.  Call [phone] for appointment/questions.            Chief Complaint:   Left shoulder pain          History of Present Illness:   Dustin Pearce is a 89 year old male admitted on 6/11/2020.  He presents to the emergency department after an unwitnessed fall at [facility].  Patient was found by staff out of his bed, facedown with overt left shoulder deformity.  Xrays in ED demonstrated an acute anterior dislocation of left shoulder. The left shoulder was reduced in the ED. Patient has a reported seizure disorder. He does not believe he had a seizure at time of the fall.            Past Medical History:     Past Medical History:   Diagnosis Date     Abdominal aortic aneurysm (H) 12/25/2016     Acute on chronic systolic congestive heart failure (H) 6/7/2018     Anemia 6/7/2018     Anemia due to blood loss, acute 6/13/2017     Aortic stenosis     mild     Benign essential hypertension 1/6/2017     Benign non-nodular prostatic hyperplasia with lower urinary tract symptoms 10/20/2016     CAD (coronary artery disease)     MI 1970     Carotid artery stenosis 10/20/2016    chronically occluded left internal carotid artery     Cerebrovascular accident (H) 10/20/2016      Cognitive impairment 6/7/2018     Coronary artery disease of native artery of native heart with stable angina pectoris (H) 10/20/2016    MI 1970     Depression, unspecified depression type 2/22/2018     Diabetes mellitus (H)      Diabetic ulcer of left foot associated with diabetes mellitus due to underlying condition (H) 6/12/2017     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     Gastro-oesophageal reflux disease      Gastroesophageal reflux disease without esophagitis 10/20/2016     Heart attack (H)      Hyperlipidemia      Hyperlipidemia, unspecified hyperlipidemia type 10/20/2016     Ischemic cardiomyopathy      Lumbar back pain 12/30/2016     Mild cognitive impairment 10/20/2016     Nephrolithiasis     RECURRENT     NSTEMI (non-ST elevated myocardial infarction) (H) 6/7/2018     Osteopenia      PAD (peripheral artery disease) (H)     peripheral angiogram 5/2017: The common iliac artery stenoses were stented and the superficial femoral artery stenoses were angioplastied     Paroxysmal atrial fibrillation (H)      Peripheral artery disease (H)     peripheral angiogram 5/2017: The common iliac artery stenoses were stented and the superficial femoral artery stenoses were angioplastied     RBBB with left anterior fascicular block      Slow transit constipation 2/22/2018     Stroke of unknown etiology (H) 12/31/2017     Syncope      TIA (transient ischemic attack) 1/20/2017     Unspecified cerebral artery occlusion with cerebral infarction      UTI (urinary tract infection) due to Enterococcus 2/22/2018     Ventricular tachycardia (H)     nonsustained             Past Surgical History:     Past Surgical History:   Procedure Laterality Date     AMPUTATE FOOT Left 6/4/2017    Procedure: AMPUTATE FOOT;  Sun Add#3: partial left foot amputation - Sagital Saw - Mini C-Arm;  Surgeon: Moe Kirk DPM;  Location: [facility] OR     CHOLECYSTECTOMY       ENDARTERECTOMY CAROTID Right 1/3/2018    Procedure:  ENDARTERECTOMY CAROTID;  RIGHT CAROTID ENDARTERECTOMY WITH EEG;  Surgeon: Roland Rodriguez MD;  Location:  OR     ESOPHAGOSCOPY, GASTROSCOPY, DUODENOSCOPY (EGD), COMBINED N/A 2016    Procedure: COMBINED ESOPHAGOSCOPY, GASTROSCOPY, DUODENOSCOPY (EGD);  Surgeon: Nasim Louis MD;  Location:  GI     GENITOURINARY SURGERY      KIDNEY STONE REMOVAL X 4     [facility] UGI ENDOSCOPY W EUS N/A 2016    Procedure: COMBINED ENDOSCOPIC ULTRASOUND, ESOPHAGOSCOPY, GASTROSCOPY, DUODENOSCOPY (EGD);  Surgeon: Nasim Louis MD;  Location:  GI     HERNIA REPAIR       INCISION AND DRAINAGE FOOT, COMBINED Left 2017    Procedure: COMBINED INCISION AND DRAINAGE FOOT;  REVISIONAL LEFT FOOT INCISION AND DRAINAGE WITH POSSIBLE FLAP CLOSURE , ANTIBIOTIC SOLUTION ;  Surgeon: Nabil Ann DPM;  Location:  OR     LASER HOLMIUM LITHOTRIPSY URETER(S), INSERT STENT, COMBINED  3/2/2012    Procedure:COMBINED CYSTOSCOPY, URETEROSCOPY, LASER HOLMIUM LITHOTRIPSY URETER(S), INSERT STENT; CYSTOSCOPY, RIGHT RETROGRADES, RIGHT URETEROSCOPY, HOLMIUM LASER, RIGHT STENT PLACEMENT; Surgeon:ANJELICA LEÓN; Location: OR     ROTATOR CUFF REPAIR RT/LT               Social History:     Social History     Tobacco Use     Smoking status: Former Smoker     Packs/day: 1.00     Years: 30.00     Pack years: 30.00     Types: Cigarettes     Start date:      Last attempt to quit: 1999     Years since quittin.4     Smokeless tobacco: Never Used   Substance Use Topics     Alcohol use: Not Currently     Alcohol/week: 7.0 standard drinks     Types: 7 Standard drinks or equivalent per week     Comment: 1 drink per biweekly             Family History:     Family History   Problem Relation Age of Onset     Breast Cancer Mother      Heart Failure Father 81             Immunizations:     VACCINE/DOSE   Diptheria   DPT   DTAP   HBIG   Hepatitis A   Hepatitis B   HIB   Influenza   Measles   Meningococcal   MMR  "  Mumps   Pneumococcal   Polio   Rubella   Small Pox   TDAP   Varicella   Zoster             Allergies:     Allergies   Allergen Reactions     Oxycodone Other (See Comments)     \"TERRIBLE SWEATING\"     Sulfa Drugs      Tetracycline              Medications:     Current Facility-Administered Medications   Medication     acetaminophen (TYLENOL) Suppository 650 mg     acetaminophen (TYLENOL) tablet 1,000 mg     acetaminophen (TYLENOL) tablet 500 mg     aspirin EC tablet 81 mg     glucose gel 15-30 g    Or     dextrose 50 % injection 25-50 mL    Or     glucagon injection 1 mg     HYDROmorphone (PF) (DILAUDID) injection 0.2 mg     insulin aspart (NovoLOG) injection (RAPID ACTING)     insulin aspart (NovoLOG) injection (RAPID ACTING)     levETIRAcetam (KEPPRA) tablet 750 mg     lidocaine (LMX4) cream     lidocaine (LMX4) cream     lidocaine 1 % 0.1-1 mL     naloxone (NARCAN) injection 0.1-0.4 mg     ondansetron (ZOFRAN-ODT) ODT tab 4 mg    Or     ondansetron (ZOFRAN) injection 4 mg     oxyCODONE IR (ROXICODONE) half-tab 2.5 mg     prochlorperazine (COMPAZINE) injection 5 mg    Or     prochlorperazine (COMPAZINE) tablet 5 mg    Or     prochlorperazine (COMPAZINE) Suppository 12.5 mg     sodium chloride (PF) 0.9% PF flush 3 mL     sodium chloride (PF) 0.9% PF flush 3 mL             Review of Systems:   ROS:  10 point ROS neg other than the symptoms noted above in the HPI.          Physical Exam:   All vitals have been reviewed  Patient Vitals for the past 24 hrs:   BP Temp Temp src Pulse Heart Rate Resp SpO2 Height Weight   06/11/20 0555 (!) 147/67 96.9  F (36.1  C) Oral 98 -- 16 96 % 1.651 m (5' 5\") 61.6 kg (135 lb 11.2 oz)   06/11/20 0530 117/66 -- -- 98 -- -- 96 % -- --   06/11/20 0515 -- -- -- -- -- -- 96 % -- --   06/11/20 0500 119/58 -- -- 94 -- -- 97 % -- --   06/11/20 0445 -- -- -- -- -- -- 97 % -- --   06/11/20 0430 123/65 -- -- 95 -- -- 98 % -- --   06/11/20 0359 (!) 145/73 -- -- -- 96 -- -- -- --   06/11/20 0138 " (!) 166/80 97.8  F (36.6  C) Oral 93 -- 14 95 % -- --     No intake or output data in the 24 hours ending 06/11/20 0945      Physical Exam   Temp: 96.9  F (36.1  C) Temp src: Oral BP: (!) 147/67 Pulse: 98 Heart Rate: 96 Resp: 16 SpO2: 96 % O2 Device: None (Room air)    Vital Signs with Ranges  Temp:  [96.9  F (36.1  C)-97.8  F (36.6  C)] 96.9  F (36.1  C)  Pulse:  [93-98] 98  Heart Rate:  [96] 96  Resp:  [14-16] 16  BP: (117-166)/(58-80) 147/67  SpO2:  [95 %-98 %] 96 %  135 lbs 11.2 oz    Constitutional: Quite sleepy t/o exam    On physical exam of the left shoulder  Immobilizer in place   Skin: intact, no ecchymosis or erythema  Swelling: mild, no significant joint effusion  Alignment: neutral  Tenderness to palpation mild global tenderness surrounding left shoulder  ROM: Able to range left wrist and fingers without difficulty.  Able to abduct left thumb.   Able to  on left vs right  Distal pulses are intact and equal bilaterally  Sensation to light touch intact and equal bilaterally.           Data:   All laboratory data reviewed  Results for orders placed or performed during the hospital encounter of 06/11/20   Head CT w/o contrast     Status: None    Narrative    EXAM: CT HEAD W/O CONTRAST, CT CERVICAL SPINE W/O CONTRAST  LOCATION: [facility]  DATE/TIME: 6/11/2020 2:17 AM    INDICATION: Head trauma, minor, GCS>=13, high clinical risk, initial exam  COMPARISON: MRI 06/03/2020  TECHNIQUE:   HEAD CT: Without IV contrast. Multiplanar reformats. Dose reduction techniques were used.  CERVICAL SPINE CT: Routine without IV contrast. Multiplanar reformats. Dose reduction techniques were used.    FINDINGS:   HEAD CT:   INTRACRANIAL CONTENTS: No intracranial hemorrhage, extraaxial collection, or mass effect.  No CT evidence of acute infarct. Moderate presumed chronic small vessel ischemic changes. Moderate-sized chronic left MCA infarct small chronic cerebellar   infarcts.. Mild to moderate generalized  volume loss. No hydrocephalus.     VISUALIZED ORBITS/SINUSES/MASTOIDS: No intraorbital abnormality. No paranasal sinus mucosal disease. No middle ear or mastoid effusion.    BONES/SOFT TISSUES: No acute abnormality.    CERVICAL SPINE CT:   VERTEBRA: Normal vertebral body heights and alignment. Partial ankylosis of C2-C3. No fracture or posttraumatic subluxation.     CANAL/FORAMINA: No high-grade central canal stenosis. Moderate bilateral neural foraminal narrowing at C5-C6 and on the right at C4-C5.    PARASPINAL: No extraspinal abnormality. Visualized lung fields are clear.      Impression    IMPRESSION:  HEAD CT:  1.  No acute intracranial process.  2.  Moderate-sized chronic left MCA infarct.    CERVICAL SPINE CT:  1.  No CT evidence for acute fracture or post traumatic subluxation.  2.  Moderate bilateral neural foraminal narrowing at C5-C6 and on the right at C4-C5.   Cervical spine CT w/o contrast     Status: None    Narrative    EXAM: CT HEAD W/O CONTRAST, CT CERVICAL SPINE W/O CONTRAST  LOCATION: [facility]  DATE/TIME: 6/11/2020 2:17 AM    INDICATION: Head trauma, minor, GCS>=13, high clinical risk, initial exam  COMPARISON: MRI 06/03/2020  TECHNIQUE:   HEAD CT: Without IV contrast. Multiplanar reformats. Dose reduction techniques were used.  CERVICAL SPINE CT: Routine without IV contrast. Multiplanar reformats. Dose reduction techniques were used.    FINDINGS:   HEAD CT:   INTRACRANIAL CONTENTS: No intracranial hemorrhage, extraaxial collection, or mass effect.  No CT evidence of acute infarct. Moderate presumed chronic small vessel ischemic changes. Moderate-sized chronic left MCA infarct small chronic cerebellar   infarcts.. Mild to moderate generalized volume loss. No hydrocephalus.     VISUALIZED ORBITS/SINUSES/MASTOIDS: No intraorbital abnormality. No paranasal sinus mucosal disease. No middle ear or mastoid effusion.    BONES/SOFT TISSUES: No acute abnormality.    CERVICAL SPINE CT:    VERTEBRA: Normal vertebral body heights and alignment. Partial ankylosis of C2-C3. No fracture or posttraumatic subluxation.     CANAL/FORAMINA: No high-grade central canal stenosis. Moderate bilateral neural foraminal narrowing at C5-C6 and on the right at C4-C5.    PARASPINAL: No extraspinal abnormality. Visualized lung fields are clear.      Impression    IMPRESSION:  HEAD CT:  1.  No acute intracranial process.  2.  Moderate-sized chronic left MCA infarct.    CERVICAL SPINE CT:  1.  No CT evidence for acute fracture or post traumatic subluxation.  2.  Moderate bilateral neural foraminal narrowing at C5-C6 and on the right at C4-C5.   XR Pelvis 1/2 Views     Status: None    Narrative    EXAM: XR PELVIS 1/2 VW  LOCATION: [facility]  DATE/TIME: 6/11/2020 1:50 AM    INDICATION: Fall with pelvic pain  COMPARISON: None.      Impression    IMPRESSION: No evidence for acute displaced fracture involving the pelvis. Minimal degenerative osteoarthrosis at both hips which remain intact. Degenerative changes at both SI joints and lower lumbar spine. Vascular calcification and stenting.   XR Shoulder Left 2 Views     Status: None    Narrative    EXAM: XR SHOULDER 2 VIEW LEFT  LOCATION: [facility]  DATE/TIME: 6/11/2020 1:50 AM    INDICATION: Fall with pain.  COMPARISON: None.      Impression    IMPRESSION: Anterior-inferior dislocation of the left humeral head relative to the glenoid cavity. No definitive evidence for acute fracture. Left acromioclavicular joint intact. Left ribs intact. Degenerative changes in the spine. Advanced   atherosclerotic vascular calcification thoracic aorta.    XR Chest 1 View     Status: None    Narrative    EXAM: XR CHEST 1 VW  LOCATION: [facility]  DATE/TIME: 6/11/2020 1:50 AM    INDICATION: Fall with pain.  COMPARISON: Left shoulder radiograph 06/11/2020      Impression    IMPRESSION: Inferior dislocation left humeral head relative to the glenoid  cavity. No fracture evident. Degenerative changes in the spine and right shoulder. Normal heart size. Normal pulmonary vascularity. Lungs clear. No pneumothorax. Advanced   atherosclerotic vascular calcification thoracic aorta.   Shoulder XR, left  2-3 vw PORTABLE     Status: None    Narrative    EXAM: XR SHOULDER LT PORT G/E 2 VW  LOCATION: [facility]  DATE/TIME: 6/11/2020 4:12 AM    INDICATION: Postreduction  COMPARISON: 06/11/2020      Impression    IMPRESSION: Interval reduction of shoulder dislocation. Degenerative changes. Vascular calcification.    CBC with platelets differential     Status: Abnormal   Result Value Ref Range    WBC 8.3 4.0 - 11.0 10e9/L    RBC Count 4.10 (L) 4.4 - 5.9 10e12/L    Hemoglobin 11.5 (L) 13.3 - 17.7 g/dL    Hematocrit 36.4 (L) 40.0 - 53.0 %    MCV 89 78 - 100 fl    MCH 28.0 26.5 - 33.0 pg    MCHC 31.6 31.5 - 36.5 g/dL    RDW 15.0 10.0 - 15.0 %    Platelet Count 267 150 - 450 10e9/L    Diff Method Automated Method     % Neutrophils 68.4 %    % Lymphocytes 24.1 %    % Monocytes 6.4 %    % Eosinophils 0.2 %    % Basophils 0.1 %    % Immature Granulocytes 0.8 %    Absolute Neutrophil 5.7 1.6 - 8.3 10e9/L    Absolute Lymphocytes 2.0 0.8 - 5.3 10e9/L    Absolute Monocytes 0.5 0.0 - 1.3 10e9/L    Absolute Eosinophils 0.0 0.0 - 0.7 10e9/L    Absolute Basophils 0.0 0.0 - 0.2 10e9/L    Abs Immature Granulocytes 0.1 0 - 0.4 10e9/L   Basic metabolic panel     Status: Abnormal   Result Value Ref Range    Sodium 136 133 - 144 mmol/L    Potassium 4.4 3.4 - 5.3 mmol/L    Chloride 102 94 - 109 mmol/L    Carbon Dioxide 26 20 - 32 mmol/L    Anion Gap 8 3 - 14 mmol/L    Glucose 198 (H) 70 - 99 mg/dL    Urea Nitrogen 23 7 - 30 mg/dL    Creatinine 0.76 0.66 - 1.25 mg/dL    GFR Estimate 79 >60 mL/min/[1.73_m2]    GFR Estimate If Black >90 >60 mL/min/[1.73_m2]    Calcium 9.3 8.5 - 10.1 mg/dL   Troponin I     Status: None   Result Value Ref Range    Troponin I ES 0.038 0.000 - 0.045 ug/L    Glucose by meter     Status: Abnormal   Result Value Ref Range    Glucose 185 (H) 70 - 99 mg/dL   EKG 12-lead, tracing only     Status: None   Result Value Ref Range    Interpretation ECG Click View Image link to view waveform and result           Attestation:  I have reviewed today's vital signs, notes, medications, labs and imaging with Dr. Cruz.  Amount of time performed on this consult: 30 minutes.    Portia Walton PA-C

## 2020-06-11 NOTE — ED NOTES
"[facility]  ED Nurse Handoff Report    ED Chief complaint: Fall (shoulder pain after being found face down at NH.  Limited ROM L shoulder)      ED Diagnosis:   Final diagnoses:   Syncope, unspecified syncope type - vs mechanical fall   Shoulder dislocation, left, initial encounter       Code Status: Full Code    Allergies:   Allergies   Allergen Reactions     Oxycodone Other (See Comments)     \"TERRIBLE SWEATING\"     Sulfa Drugs      Tetracycline        Patient Story: pt found face down by staff L shoulder displaced.  Pt has hx of seizures, V Tach.  Focused Assessment:  L shoulder displaced, bruising all over face and arms noted old bruising on chest.    Treatments and/or interventions provided: Closed reduction, shoulder immobilizer.  Patient's response to treatments and/or interventions: Pt more comfortable    To be done/followed up on inpatient unit: asymptomatic covid test sent    Does this patient have any cognitive concerns?: Forgetful    Activity level - Baseline/Home:  Walker and Wheelchair  Activity Level - Current:   Total Care    Patient's Preferred language: English   Needed?: No    Isolation: None  Infection: Not Applicable  Bariatric?: No    Vital Signs:   Vitals:    06/11/20 0445 06/11/20 0500 06/11/20 0515 06/11/20 0530   BP:  119/58  117/66   Pulse:  94  98   Resp:       Temp:       TempSrc:       SpO2: 97% 97% 96% 96%       Cardiac Rhythm:     Was the PSS-3 completed:   Yes  What interventions are required if any?  none             Family Comments: wife Halina aware of plan  OBS brochure/video discussed/provided to patient/family: Yes              Name of person given brochure if not patient: discussed with pt and wife Halina              Relationship to patient: wife    For the majority of the shift this patient's behavior was Green.   Behavioral interventions performed were none.    ED NURSE PHONE NUMBER: *[phone]         "

## 2020-06-11 NOTE — PROGRESS NOTES
Patient admit from ED at 0600. Patient oriented to self but seems unsure of other questions. Strength in L hand  slightly less strong than R. Immobilizer in place from L shoulder relocation. Other neuro WDL. L elbow scrape, bilateral eye bruises and scattered bruises throughout. Room air. Observation for today; possible discharge later today or tomorrow. Seizure and fall precautions. Bed alarm active. Tele SR. Consults with Neurology, orthopedics and PT.

## 2020-06-11 NOTE — H&P
[facility]    History and Physical - Hospitalist Service       Date of Admission:  6/11/2020    Assessment & Plan   Dustin Pearce is a 89 year old male admitted on 6/11/2020. He presents to the emergency department after an unwitnessed fall at [facility] and is found to have acute anterior dislocation of left shoulder.    Acute left shoulder dislocation: Reduced in the emergency department by Dr. Krishnan and now in immobilizer  -Orthopedic surgery consulted for trauma evaluation  -Physical therapy consulted given patient's baseline mobility with a walker to assess safe mobility prior to discharge    Unwitnessed fall: Given unwitnessed fall, observation admission requested to evaluate for potential etiologies.  Note that patient has had multiple recent evaluations of his neurologic and cardiac status including recent ZIO patch with no significant arrhythmias completed earlier this month as well as fairly recent EEGs including in March during hospitalization and what appears to be a completed EEG from 1 June.  Significant other actually believes that fall was likely related to orthostasis, as patient has chronic issues with this in the home setting and requires reminders to go slow and stay at the edge of the bed before standing.  -Every 4 hours neurochecks as above  -Cardiac telemetry  -Compression stockings to lower extremities given patient's history of orthostasis and parkinsonism  -Orthostatic blood pressure and pulse  -Neurology consulted as below    Seizure disorder: Patient unable to provide any history regarding his seizure disorders, does not believe he is ever had a seizure.  His significant other believes he may have had a seizure last week when neurology was contacted.  Completed an EEG last 6/1/2020, prior to this in March with no seizure activity caught.  No recent changes to medications  -Keppra level pending  -Keppra 750 mg twice daily ordered while awaiting pharmacy  reconciliation  -Resume prior to admission Vimpat when reconciled by pharmacy.  -Seizure precautions  -Neurology consulted.  Patient is apparently due for follow-up, though fall with immobilizer will likely limit his ability to present to clinic.    Type 2 diabetes:   Most recent hemoglobin A1c 6.3 6/1/2020.  -Medium dose sliding scale insulin as needed    Peripheral arterial disease:  Coronary artery disease with associated ischemic cardiomyopathy: Ejection fraction of 30 to 35% on most recent echocardiogram April 2020 with mild mitral and moderate aortic stenosis  History of CVA:  Carotid artery stenosis: Status post right carotid endarterectomy  Paroxysmal atrial fibrillation:  -Continue prior to admission ASA 81 mg daily  -Can resume prior to admission atorvastatin at discharge.  Held given observation admission  -Resume prior to admission Brilinta when reconciled by pharmacy  -Recently off of beta-blocker, okay to continue off per recent cardiology ZIO patch monitoring, likely in the setting of orthostasis events.         Diet: Regular Diet Adult    DVT Prophylaxis: Ambulate every shift  Luis Catheter: not present  Code Status: Full Code           Disposition Plan   Expected discharge: Late today versus tomorrow following physical therapy and orthopedic surgery assessment, neurology recommendations.  I do not anticipate finding evidence of seizure or dysrhythmia during hospitalization., recommended to prior living arrangement once Safe disposition plan in place regarding patient's mobility.  Entered: Germain Segura MD 06/11/2020, 6:50 AM     The patient's care was discussed with the Patient and Patient significant other Dr. Eulogio Meade in the emergency department, bedside nurse..    Germain Segura MD  [facility]    ______________________________________________________________________    Chief Complaint   Fall with left shoulder dislocation    History is obtained from the patient,  chart review, discussion with Dr. Krishnan in the emergency department, discussion with patient significant other, Halina over the telephone upon admission.  Patient's ability to provide a history is limited secondary to his dementia.  For instance, he does not believe he has ever had a seizure and is unaware of his medications.  He has a surgical scar over his right shoulder, though does not recall ever having an orthopedic surgery in the past.  Known multiple orthopedic surgeries including partial left foot amputation and right rotator cuff repair    History of Present Illness   Dustin Pearce is a 89 year old male who presents to the emergency department after an unwitnessed fall at [facility].  Patient was found by staff out of his bed, facedown with overt left shoulder deformity.  Patient with a history of seizure disorder, atrial and ventricular arrhythmias recently off of beta-blockade, though with ZIO patch monitoring completed earlier this month without significant arrhythmias.  Has a history of seizure disorder with no recent change to medications, though significant other wonders if he may have had a seizure last week when she contacted care team.  Multiple recent EEGs, including what appears to be an EEG from 6/1 which has not yet been finalized, though no recent seizure activity reported on EEGs including with preliminary report provided by patient significant other.  Patient does, however, have a history of frequent orthostasis.  His significant other tells me that he often needs to be told to sit back down and take it slow nearly every time he attempts to stand from a seated or laying position.  Often requires reminders to do this.  Does not wear compression stockings.  Patient does have access to a urinal at bedside, though dislikes using this, and likely was getting up to use the bathroom as described by EMS and care staff.    Patient was to have shoulder reduction in the stable room under  anesthesia, though while prepping for anesthesia and with manipulation of shoulder gently in the emergency department, Dr. Krishnan was actually able to reduce shoulder without sedation.  Patient is now in an immobilizer, and he reports that he does not have any significant pain.  Patient is not hungry, is tired after an eventful night.    Patient confirms his full CODE STATUS, consistent with prior POLST.  He tells me that Halina is his girlfriend who would make his decisions if he were unable to.    I did discuss with patient significant other at patient's request.  She provides the history of orthostasis, was aware of plan for observation admission potentially for 24 hours.  Discussed that it is entirely possible that patient may discharge back to [facility] later today if cleared by physical therapy.  As above, I do not anticipate significant cardiac findings on telemetry or seizure activity.  Neurology consultation requested largely for patient convenience given difficulty mobilizing and the fact the patient is due in the near term for follow-up.  Primary neurologist is Dr. Padilla through [facility], [facility].    Review of Systems    The 10 point Review of Systems is negative other than noted in the HPI or here.  No nausea, no vomiting, no shortness of breath.  No abdominal pain.    Past Medical History    I have reviewed this patient's medical history and updated it with pertinent information if needed.   Past Medical History:   Diagnosis Date     Abdominal aortic aneurysm (H) 12/25/2016     Acute on chronic systolic congestive heart failure (H) 6/7/2018     Anemia 6/7/2018     Anemia due to blood loss, acute 6/13/2017     Aortic stenosis     mild     Benign essential hypertension 1/6/2017     Benign non-nodular prostatic hyperplasia with lower urinary tract symptoms 10/20/2016     CAD (coronary artery disease)     MI 1970     Carotid artery stenosis 10/20/2016    chronically occluded  left internal carotid artery     Cerebrovascular accident (H) 10/20/2016     Cognitive impairment 6/7/2018     Coronary artery disease of native artery of native heart with stable angina pectoris (H) 10/20/2016    MI 1970     Depression, unspecified depression type 2/22/2018     Diabetes mellitus (H)      Diabetic ulcer of left foot associated with diabetes mellitus due to underlying condition (H) 6/12/2017     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     Gastro-oesophageal reflux disease      Gastroesophageal reflux disease without esophagitis 10/20/2016     Heart attack (H)      Hyperlipidemia      Hyperlipidemia, unspecified hyperlipidemia type 10/20/2016     Ischemic cardiomyopathy      Lumbar back pain 12/30/2016     Mild cognitive impairment 10/20/2016     Nephrolithiasis     RECURRENT     NSTEMI (non-ST elevated myocardial infarction) (H) 6/7/2018     Osteopenia      PAD (peripheral artery disease) (H)     peripheral angiogram 5/2017: The common iliac artery stenoses were stented and the superficial femoral artery stenoses were angioplastied     Paroxysmal atrial fibrillation (H)      Peripheral artery disease (H)     peripheral angiogram 5/2017: The common iliac artery stenoses were stented and the superficial femoral artery stenoses were angioplastied     RBBB with left anterior fascicular block      Slow transit constipation 2/22/2018     Stroke of unknown etiology (H) 12/31/2017     Syncope      TIA (transient ischemic attack) 1/20/2017     Unspecified cerebral artery occlusion with cerebral infarction      UTI (urinary tract infection) due to Enterococcus 2/22/2018     Ventricular tachycardia (H)     nonsustained       Past Surgical History   I have reviewed this patient's surgical history and updated it with pertinent information if needed.  Past Surgical History:   Procedure Laterality Date     AMPUTATE FOOT Left 6/4/2017    Procedure: AMPUTATE FOOT;  Sun Add#3: partial left foot  amputation - Sagital Saw - Mini C-Arm;  Surgeon: Moe Kirk DPM;  Location:  OR     CHOLECYSTECTOMY       ENDARTERECTOMY CAROTID Right 1/3/2018    Procedure: ENDARTERECTOMY CAROTID;  RIGHT CAROTID ENDARTERECTOMY WITH EEG;  Surgeon: Roland Rodriguez MD;  Location:  OR     ESOPHAGOSCOPY, GASTROSCOPY, DUODENOSCOPY (EGD), COMBINED N/A 2016    Procedure: COMBINED ESOPHAGOSCOPY, GASTROSCOPY, DUODENOSCOPY (EGD);  Surgeon: Nasim Louis MD;  Location:  GI     GENITOURINARY SURGERY      KIDNEY STONE REMOVAL X 4     HC UGI ENDOSCOPY W EUS N/A 2016    Procedure: COMBINED ENDOSCOPIC ULTRASOUND, ESOPHAGOSCOPY, GASTROSCOPY, DUODENOSCOPY (EGD);  Surgeon: Nasim Louis MD;  Location:  GI     HERNIA REPAIR       INCISION AND DRAINAGE FOOT, COMBINED Left 2017    Procedure: COMBINED INCISION AND DRAINAGE FOOT;  REVISIONAL LEFT FOOT INCISION AND DRAINAGE WITH POSSIBLE FLAP CLOSURE , ANTIBIOTIC SOLUTION ;  Surgeon: Nabil Ann DPM;  Location:  OR     LASER HOLMIUM LITHOTRIPSY URETER(S), INSERT STENT, COMBINED  3/2/2012    Procedure:COMBINED CYSTOSCOPY, URETEROSCOPY, LASER HOLMIUM LITHOTRIPSY URETER(S), INSERT STENT; CYSTOSCOPY, RIGHT RETROGRADES, RIGHT URETEROSCOPY, HOLMIUM LASER, RIGHT STENT PLACEMENT; Surgeon:ANJELICA LEÓN; Location: OR     ROTATOR CUFF REPAIR RT/LT         Social History   I have reviewed this patient's social history and updated it with pertinent information if needed.  Social History     Tobacco Use     Smoking status: Former Smoker     Packs/day: 1.00     Years: 30.00     Pack years: 30.00     Types: Cigarettes     Start date:      Last attempt to quit: 1999     Years since quittin.4     Smokeless tobacco: Never Used   Substance Use Topics     Alcohol use: Not Currently     Alcohol/week: 7.0 standard drinks     Types: 7 Standard drinks or equivalent per week     Comment: 1 drink per biweekly     Drug use: No       Family  History   I have reviewed this patient's family history and updated it with pertinent information if needed.   Family History   Problem Relation Age of Onset     Breast Cancer Mother      Heart Failure Father 81       Prior to Admission Medications   Prior to Admission Medications   Prescriptions Last Dose Informant Patient Reported? Taking?   DULoxetine (CYMBALTA) 30 MG capsule   No No   Sig: TAKE 1 CAPSULE(30 MG) BY MOUTH DAILY   Lacosamide (VIMPAT) 100 MG TABS tablet   No No   Sig: Take 1 tablet (100 mg) by mouth 2 times daily . Future refills by PCP Dr. Matt Morrissey with phone number [phone].   acetaminophen (TYLENOL) 500 MG tablet   Yes No   Sig: Take 1,000 mg by mouth every 8 hours as needed for pain   aspirin (ASA) 81 MG EC tablet   No No   Sig: Take 1 tablet (81 mg) by mouth daily   atorvastatin (LIPITOR) 40 MG tablet   No No   Sig: Take 1 tablet (40 mg) by mouth daily   Patient taking differently: Take 40 mg by mouth daily GIVE IN PM   ipratropium (ATROVENT) 0.03 % nasal spray  Nursing Home No No   Sig: INHALE 1 SPRAY IN EACH NOSTRIL EVERY 12 HOURS AS NEEDED   levETIRAcetam (KEPPRA) 750 MG tablet   No No   Sig: Take 1 tablet (750 mg) by mouth 2 times daily   magnesium oxide (MAGNESIUM-OXIDE) 400 (241.3 Mg) MG tablet   No No   Sig: Take 1 tablet (400 mg) by mouth 2 times daily   Patient taking differently: Take 400 mg by mouth 2 times daily GIVE AT LUNCH AND SUPPER   metFORMIN (GLUCOPHAGE) 1000 MG tablet   No No   Sig: Take 1 tablet (1,000 mg) by mouth 2 times daily (with meals)   nitroGLYcerin (NITROSTAT) 0.4 MG sublingual tablet  Nursing Home No No   Sig: For chest pain place 1 tablet under the tongue every 5 minutes for 3 doses. If symptoms persist 5 minutes after 1st dose call 911.   omeprazole (PRILOSEC) 40 MG DR capsule   No No   Sig: TAKE 1 CAPSULE(40 MG) BY MOUTH DAILY 30 TO 60 MINUTES BEFORE A MEAL   polyethylene glycol-propylene glycol (SYSTANE ULTRA) 0.4-0.3 % SOLN ophthalmic solution   "Nursing Home Yes No   Sig: Place 2 drops into both eyes daily as needed for dry eyes   sennosides (SENOKOT) 8.6 MG tablet  Nursing Home Yes No   Sig: Take 2 tablets by mouth daily Hold for loose stools   tamsulosin (FLOMAX) 0.4 MG capsule   No No   Sig: Take 1 capsule (0.4 mg) by mouth daily   ticagrelor (BRILINTA) 90 MG tablet   No No   Sig: Take 1 tablet (90 mg) by mouth 2 times daily      Facility-Administered Medications: None     Allergies   Allergies   Allergen Reactions     Oxycodone Other (See Comments)     \"TERRIBLE SWEATING\"     Sulfa Drugs      Tetracycline        Physical Exam   Vital Signs: Temp: 96.9  F (36.1  C) Temp src: Oral BP: (!) 147/67 Pulse: 98 Heart Rate: 96 Resp: 16 SpO2: 96 % O2 Device: None (Room air)    Weight: 135 lbs 11.2 oz    General Appearance: Frail and pallorous appearing 89-year-old male lying comfortably in bed in no distress, left arm in immobilizer.  Eyes: No scleral icterus or injection.  HEENT: Normocephalic, though bruising and ecchymosis on face most notable around  right eye, though some around her left eye as well.  Respiratory: Breath sounds are clear bilaterally to auscultation with fair effort.  Cardiovascular: Heart rate in the 90 range.  Somewhat distant heart sounds.  Early systolic murmur appreciated best at right upper sternal border  GI: Abdomen soft, nontender palpation.  No palpable mass.  Lymph/Hematologic: No lower extremity edema  Genitourinary: Not examined  Skin: Pallorous with scattered ecchymoses and skin tears including right wrist, left elbow.  Bruising is more diffuse including mild periorbital ecchymosis on left, more notable ecchymosis on right.  Evidence of multiple small trauma events as well as some bruising likely not related to trauma.  Musculoskeletal: Moderate diffuse muscular wasting in all extremities.  Left arm in immobilizer.  Neurologic: Slight right-sided facial droop present with history of chronic CVA.  Parkinsonism features with " masked face ease.   Psychiatric: Blunted affect with masked facies.    Data   Data reviewed today: I reviewed all medications, new labs and imaging results over the last 24 hours. I personally reviewed the Post shoulder reduction x-ray image(s) showing Reduced left shoulder dislocation.    Recent Labs   Lab 06/11/20  0144 06/04/20  0730   WBC 8.3 8.6   HGB 11.5* 10.3*   MCV 89 91    172     --    POTASSIUM 4.4  --    CHLORIDE 102  --    CO2 26  --    BUN 23  --    CR 0.76  --    ANIONGAP 8  --    ALLISON 9.3  --    *  --    TROPI 0.038  --

## 2020-06-11 NOTE — PROVIDER NOTIFICATION
MD Notification    Notified Person: MD    Notified Person Name: Dr Calvilloh    Notification Date/Time: 6-11-20    Notification Interaction: FYI paged    Purpose of Notification: Orthostatics positive from lying to sitting; pt unable to tolerate standing long enough for BP to read    Orders Received:     Comments: Await any new orders if needed

## 2020-06-11 NOTE — ED NOTES
"RN, EDT, MD at bedside, MD able to reduce left shoulder without medication. Shoulder immobilizer placed, will repeat xray. Pt resting quietly, states \"my arm feels better\". +CSM.   "

## 2020-06-11 NOTE — PLAN OF CARE
Disoriented to time and place. Slightly irritable at times but mostly calm and cooperative. Tele SR with BBB. Orthostatic from lying to sitting positive; per MD no new orders at this time; re-eval in AM. States shoulder pain is controlled; on scheduled tylenol. CMS intact. Left UE with immobilizer in place. Some bruising under left arm pit; immobilizer adjusted by ortho. Urinary retention; ruiz placed. Poor appetite; needs encouragement. Teds on per orders. Requires assist to shift weight. Heels elevated. Skin intact. Seizure precautions in place. Plan pending clinical progress. Continue to monitor.

## 2020-06-12 NOTE — DISCHARGE SUMMARY
[facility]  Hospitalist Discharge Summary      Date of Admission:  6/11/2020  Date of Discharge:  6/12/2020  Discharging Provider: Tutu Bañuelos MD      Discharge Diagnoses     Acute left shoulder dislocation  Syncope -> suspect from orthostatic hypotension    Follow-ups Needed After Discharge   Follow-up Appointments     Follow Up and recommended labs and tests      Follow up with long term physician.  The following labs/tests are   recommended: None.           Unresulted Labs Ordered in the Past 30 Days of this Admission     No orders found for last 31 day(s).        Discharge Disposition   Discharged to rehabilitation facility  Condition at discharge: Stable    Hospital Course   Dustin Pearce is a 89 year old male admitted on 6/11/2020. He presents to the emergency department after an unwitnessed fall at [facility] and is found to have acute anterior dislocation of left shoulder.    Acute left shoulder dislocation: Reduced in the emergency department by Dr. Krishnan and now in immobilizer  -Orthopedic surgery consulted ->  Recommend immobilizer and follow up in 2 wks in clinic with me.      Unwitnessed fall: Given unwitnessed fall, observation admission requested to evaluate for potential etiologies.  Note that patient has had multiple recent evaluations of his neurologic and cardiac status including recent ZIO patch with no significant arrhythmias completed earlier this month as well as fairly recent EEGs including in March during hospitalization and what appears to be a completed EEG from 1 June. Suspect this was likely related to orthostasis.  Plan:  -Compression stockings to lower extremities given patient's history of orthostasis and parkinsonism  -Orthostatics    Seizure disorder: Patient unable to provide any history regarding his seizure disorders, does not believe he is ever had a seizure.  His significant other believes he may have had a seizure last week when neurology was  contacted.  Completed an EEG last 6/1/2020, prior to this in March with no seizure activity caught.  No recent changes to medications  Plan:  -Neurology consulted -> Continue PTA antiepileptic drugs Keppra and Vimpat.   -Repeat EEG -> no epileptiform changes    Type 2 diabetes:   Most recent hemoglobin A1c 6.3 6/1/2020. Follow as outpatient    Urinary Retention  Assessment: suspect from BPH.  ruiz catheter placed  Plan:  - Follow as outpatient with urology  - Continue flomax     Peripheral arterial disease:  Coronary artery disease with associated ischemic cardiomyopathy: Ejection fraction of 30 to 35% on most recent echocardiogram April 2020 with mild mitral and moderate aortic stenosis  History of CVA:  Carotid artery stenosis: Status post right carotid endarterectomy  Paroxysmal atrial fibrillation:  -Continue prior to admission ASA 81 mg daily  -Can resume prior to admission atorvastatin at discharge.   -Resume prior to admission Brilinta   -Recently off of beta-blocker, okay to continue off per recent cardiology ZIO patch monitoring, likely in the setting of orthostasis events.          Consultations This Hospital Stay   RESPIRATORY CARE IP CONSULT  ORTHOPEDIC SURGERY IP CONSULT  PHYSICAL THERAPY ADULT IP CONSULT  SOCIAL WORK IP CONSULT  NEUROLOGY IP CONSULT  PHYSICAL THERAPY ADULT IP CONSULT  OCCUPATIONAL THERAPY ADULT IP CONSULT  PHYSICAL THERAPY ADULT IP CONSULT  OCCUPATIONAL THERAPY ADULT IP CONSULT    Code Status   Full Code    Time Spent on this Encounter   I, Tutu Bañuelos MD, personally saw the patient today and spent greater than 30 minutes discharging this patient.       Tutu Bañuelos MD  [facility]  ______________________________________________________________________    Physical Exam   Vital Signs: Temp: 97.9  F (36.6  C) Temp src: Oral BP: (!) 141/69   Heart Rate: 82 Resp: 16 SpO2: 95 % O2 Device: None (Room air)    Weight: 135 lbs 11.2 oz       Primary Care Physician   Matt GALVEZ  [place]    Discharge Orders      General info for SNF    Length of Stay Estimate: Short Term Care: Estimated # of Days <30  Condition at Discharge: Stable  Level of care:skilled   Rehabilitation Potential:   Admission H&P remains valid and up-to-date: Yes  Recent Chemotherapy: N/A  Use Nursing Home Standing Orders: Yes     Mantoux instructions    Give two-step Mantoux (PPD) Per Facility Policy Yes     Reason for your hospital stay    You had a shoulder injury from a fall and loss of consciousness. Had Neurology evaluation.     Intake and output    Every shift     Daily weights    Call Provider for weight gain of more than 2 pounds per day or 5 pounds per week.     Follow Up and recommended labs and tests    Follow up with half-way physician.  The following labs/tests are recommended: None.     Activity - Up with assistive device     Luis catheter    To straight gravity drainage. Change catheter every 2 weeks and PRN for leaking or decreased uring output with signs of bladder distention. DO NOT change catheter without a specific MD order IF diagnosis of benign prostatic hypertrophy (BPH), neurogenic bladder, or other urological conditions     Physical Therapy Adult Consult    Evaluate and treat as clinically indicated.    Reason:  Physical deconditioning     Occupational Therapy Adult Consult    Evaluate and treat as clinically indicated.    Reason:  Physical deconditioning     Advance Diet as Tolerated    Follow this diet upon discharge: Orders Placed This Encounter      Room Service      Regular Diet Adult       Significant Results and Procedures   Most Recent 3 CBC's:  Recent Labs   Lab Test 06/11/20  0144 06/04/20  0730 06/03/20  0744   WBC 8.3 8.6 12.9*   HGB 11.5* 10.3* 10.5*   MCV 89 91 90    172 193     Most Recent 3 BMP's:  Recent Labs   Lab Test 06/11/20  0144 06/03/20  0744 06/02/20  0738    139 138   POTASSIUM 4.4 3.8 3.9   CHLORIDE 102 109 106   CO2 26 25 24   BUN 23 19 15   CR 0.76 0.86  0.70   ANIONGAP 8 5 8   ALLISON 9.3 8.6 8.8   * 59* 86     Most Recent 2 LFT's:  Recent Labs   Lab Test 06/01/20  0336 05/01/20  1005   AST 36 15   ALT 28 18   ALKPHOS 125 97   BILITOTAL 0.6 0.9     Most Recent 3 INR's:  Recent Labs   Lab Test 05/01/20  1005 04/18/20  1944 03/05/20  1411   INR 1.03 1.08 1.07     Most Recent Urinalysis:  Recent Labs   Lab Test 06/11/20  1335  03/26/18  1057   COLOR Yellow   < > Yellow   APPEARANCE Clear   < > Slightly Cloudy   URINEGLC Negative   < > Negative   URINEBILI Negative   < > Negative   URINEKETONE 10*   < > Trace*   SG 1.024   < > 1.020   UBLD Negative   < > Negative   URINEPH 5.5   < > 5.5   PROTEIN 10*   < > 30*   UROBILINOGEN  --   --  0.2   NITRITE Negative   < > Negative   LEUKEST Negative   < > Negative   RBCU <1   < >  --    WBCU 2   < >  --     < > = values in this interval not displayed.   ,   Results for orders placed or performed during the hospital encounter of 06/11/20   Head CT w/o contrast    Narrative    EXAM: CT HEAD W/O CONTRAST, CT CERVICAL SPINE W/O CONTRAST  LOCATION: [facility]  DATE/TIME: 6/11/2020 2:17 AM    INDICATION: Head trauma, minor, GCS>=13, high clinical risk, initial exam  COMPARISON: MRI 06/03/2020  TECHNIQUE:   HEAD CT: Without IV contrast. Multiplanar reformats. Dose reduction techniques were used.  CERVICAL SPINE CT: Routine without IV contrast. Multiplanar reformats. Dose reduction techniques were used.    FINDINGS:   HEAD CT:   INTRACRANIAL CONTENTS: No intracranial hemorrhage, extraaxial collection, or mass effect.  No CT evidence of acute infarct. Moderate presumed chronic small vessel ischemic changes. Moderate-sized chronic left MCA infarct small chronic cerebellar   infarcts.. Mild to moderate generalized volume loss. No hydrocephalus.     VISUALIZED ORBITS/SINUSES/MASTOIDS: No intraorbital abnormality. No paranasal sinus mucosal disease. No middle ear or mastoid effusion.    BONES/SOFT TISSUES: No acute  abnormality.    CERVICAL SPINE CT:   VERTEBRA: Normal vertebral body heights and alignment. Partial ankylosis of C2-C3. No fracture or posttraumatic subluxation.     CANAL/FORAMINA: No high-grade central canal stenosis. Moderate bilateral neural foraminal narrowing at C5-C6 and on the right at C4-C5.    PARASPINAL: No extraspinal abnormality. Visualized lung fields are clear.      Impression    IMPRESSION:  HEAD CT:  1.  No acute intracranial process.  2.  Moderate-sized chronic left MCA infarct.    CERVICAL SPINE CT:  1.  No CT evidence for acute fracture or post traumatic subluxation.  2.  Moderate bilateral neural foraminal narrowing at C5-C6 and on the right at C4-C5.   Cervical spine CT w/o contrast    Narrative    EXAM: CT HEAD W/O CONTRAST, CT CERVICAL SPINE W/O CONTRAST  LOCATION: [facility]  DATE/TIME: 6/11/2020 2:17 AM    INDICATION: Head trauma, minor, GCS>=13, high clinical risk, initial exam  COMPARISON: MRI 06/03/2020  TECHNIQUE:   HEAD CT: Without IV contrast. Multiplanar reformats. Dose reduction techniques were used.  CERVICAL SPINE CT: Routine without IV contrast. Multiplanar reformats. Dose reduction techniques were used.    FINDINGS:   HEAD CT:   INTRACRANIAL CONTENTS: No intracranial hemorrhage, extraaxial collection, or mass effect.  No CT evidence of acute infarct. Moderate presumed chronic small vessel ischemic changes. Moderate-sized chronic left MCA infarct small chronic cerebellar   infarcts.. Mild to moderate generalized volume loss. No hydrocephalus.     VISUALIZED ORBITS/SINUSES/MASTOIDS: No intraorbital abnormality. No paranasal sinus mucosal disease. No middle ear or mastoid effusion.    BONES/SOFT TISSUES: No acute abnormality.    CERVICAL SPINE CT:   VERTEBRA: Normal vertebral body heights and alignment. Partial ankylosis of C2-C3. No fracture or posttraumatic subluxation.     CANAL/FORAMINA: No high-grade central canal stenosis. Moderate bilateral neural foraminal  narrowing at C5-C6 and on the right at C4-C5.    PARASPINAL: No extraspinal abnormality. Visualized lung fields are clear.      Impression    IMPRESSION:  HEAD CT:  1.  No acute intracranial process.  2.  Moderate-sized chronic left MCA infarct.    CERVICAL SPINE CT:  1.  No CT evidence for acute fracture or post traumatic subluxation.  2.  Moderate bilateral neural foraminal narrowing at C5-C6 and on the right at C4-C5.   XR Pelvis 1/2 Views    Narrative    EXAM: XR PELVIS 1/2 VW  LOCATION: [facility]  DATE/TIME: 6/11/2020 1:50 AM    INDICATION: Fall with pelvic pain  COMPARISON: None.      Impression    IMPRESSION: No evidence for acute displaced fracture involving the pelvis. Minimal degenerative osteoarthrosis at both hips which remain intact. Degenerative changes at both SI joints and lower lumbar spine. Vascular calcification and stenting.   XR Shoulder Left 2 Views    Narrative    EXAM: XR SHOULDER 2 VIEW LEFT  LOCATION: [facility]  DATE/TIME: 6/11/2020 1:50 AM    INDICATION: Fall with pain.  COMPARISON: None.      Impression    IMPRESSION: Anterior-inferior dislocation of the left humeral head relative to the glenoid cavity. No definitive evidence for acute fracture. Left acromioclavicular joint intact. Left ribs intact. Degenerative changes in the spine. Advanced   atherosclerotic vascular calcification thoracic aorta.    XR Chest 1 View    Narrative    EXAM: XR CHEST 1 VW  LOCATION: [facility]  DATE/TIME: 6/11/2020 1:50 AM    INDICATION: Fall with pain.  COMPARISON: Left shoulder radiograph 06/11/2020      Impression    IMPRESSION: Inferior dislocation left humeral head relative to the glenoid cavity. No fracture evident. Degenerative changes in the spine and right shoulder. Normal heart size. Normal pulmonary vascularity. Lungs clear. No pneumothorax. Advanced   atherosclerotic vascular calcification thoracic aorta.   Shoulder XR, left  2-3 vw PORTABLE     Narrative    EXAM: XR SHOULDER LT PORT G/E 2 VW  LOCATION: [facility]  DATE/TIME: 6/11/2020 4:12 AM    INDICATION: Postreduction  COMPARISON: 06/11/2020      Impression    IMPRESSION: Interval reduction of shoulder dislocation. Degenerative changes. Vascular calcification.        Discharge Medications   Current Discharge Medication List      START taking these medications    Details   oxyCODONE (ROXICODONE) 5 MG tablet Take 0.5 tablets (2.5 mg) by mouth every 3 hours as needed for moderate to severe pain  Qty: 25 tablet, Refills: 0    Associated Diagnoses: Closed dislocation of left shoulder, initial encounter         CONTINUE these medications which have NOT CHANGED    Details   acetaminophen (TYLENOL) 500 MG tablet Take 1,000 mg by mouth every 8 hours as needed for pain      aspirin (ASA) 81 MG EC tablet Take 1 tablet (81 mg) by mouth daily  Qty: 30 tablet, Refills: 0    Associated Diagnoses: Transient cerebral ischemia, unspecified type      atorvastatin (LIPITOR) 40 MG tablet Take 1 tablet (40 mg) by mouth daily  Qty: 30 tablet, Refills: 0    Associated Diagnoses: PAD (peripheral artery disease) (H)      DULoxetine (CYMBALTA) 30 MG capsule TAKE 1 CAPSULE(30 MG) BY MOUTH DAILY  Qty: 30 capsule, Refills: 0    Associated Diagnoses: Polyneuropathy associated with underlying disease (H)      ipratropium (ATROVENT) 0.03 % nasal spray INHALE 1 SPRAY IN EACH NOSTRIL EVERY 12 HOURS AS NEEDED  Qty: 30 mL, Refills: 11    Associated Diagnoses: Runny nose      Lacosamide (VIMPAT) 100 MG TABS tablet Take 1 tablet (100 mg) by mouth 2 times daily . Future refills by PCP Dr. Matt Morrissey with phone number [phone].  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 If symptoms persist 5 minutes after 1st dose call 911.  Qty: 25 tablet, Refills: 0    Associated Diagnoses: Coronary artery disease involving native heart, angina presence unspecified, unspecified vessel or lesion type      omeprazole (PRILOSEC) 40 MG DR capsule TAKE 1 CAPSULE(40 MG) BY MOUTH DAILY 30 TO 60 MINUTES BEFORE A MEAL  Qty: 30 capsule, Refills: 0    Associated Diagnoses: Other acute gastritis without hemorrhage      !! polyethylene glycol-propylene glycol (SYSTANE ULTRA) 0.4-0.3 % SOLN ophthalmic solution Place 1 drop into both eyes daily as needed for dry eyes      !! polyethylene glycol-propylene glycol (SYSTANE ULTRA) 0.4-0.3 % SOLN ophthalmic solution Place 2 drops into both eyes daily       sennosides (SENOKOT) 8.6 MG tablet Take 2 tablets by mouth daily Hold for loose stools      tamsulosin (FLOMAX) 0.4 MG capsule Take 1 capsule (0.4 mg) by mouth daily  Qty: 30 capsule, Refills: 0    Associated Diagnoses: Benign non-nodular prostatic hyperplasia with lower urinary tract symptoms      ticagrelor (BRILINTA) 90 MG tablet Take 1 tablet (90 mg) by mouth 2 times daily  Qty: 60 tablet, Refills: 0    Associated Diagnoses: TIA (transient ischemic attack)       !! - Potential duplicate medications found. Please discuss with provider.        Allergies   Allergies   Allergen Reactions     Oxycodone Other (See Comments)     \"TERRIBLE SWEATING\"     Sulfa Drugs      Tetracycline      "

## 2020-06-12 NOTE — PLAN OF CARE
Patient discharged at 3:06 PM to Discharged to rehabilitation facility  IV was discontinued RN. Pain at time of discharge was 0/10. Belongings returned to patient.  Discharge instructions and medications reviewed with patient.  Patient verbalized understanding and all questions were answered.  Prescriptions given to patient.  At time of discharge, patient condition was stable and left the unit via Stretcher escorted by EMS.

## 2020-06-12 NOTE — CONSULTS
SW Consult Note:    Care Transition Initial Assessment - SW     Met with: Patient and significant other (via phone)  Principal Problem:    Closed dislocation of left shoulder, initial encounter       DATA  Lives With: significant other      Quality of Family Relationships: helpful, involved, supportive  Description of Support System: Supportive, Involved  Who is your support system?: Significant Other  Support Assessment: Adequate family and caregiver support, Adequate social supports.   Identified issues/concerns regarding health management: Discharge planning.   Resources List: Skilled Nursing Facility  Other Resources: Transportation Services  Quality of Family Relationships: helpful, involved, supportive  Transportation Anticipated: agency    ASSESSMENT  Cognitive Status:  awake, alert and oriented to self, place  Concerns to be addressed: Patient is a 89 year old male that was admitted to the hospital on 6/11/20 with a primary diagnosis of closed dislocation of left shoulder, initial encounter.  [facility] and has [facility] bed hold at their facility.  Reviewed chart and spoke with patient regarding discharge planning.  Patient lives at home with his significant other when in the community.  Discussed returning to TCU and patient and significant other in agreement.  Patient in need of stretcher transport to be arranged due to dx of cognitive impairment, confusion, impulsiveness, orthostatic hypotension with sitting.  SW placed call to [facility] to arrange for stretcher transport at 2:45 today. Discussed that we can not guarantee insurance coverage for transportation. Updated patient and significant other on time and both in agreement with discharge plan. PCS form completed, faxed to [facility] and provided to [facility]. Orders and scripts faxed.      PLAN  Financial costs for the patient includes: Medicare/BCBS  Patient given options and choices for discharge: Return  to [facility]  Patient/family is agreeable to the plan? Yes  Transportation/person available to transport on day of discharge: [facility]  Patient Goals and Preferences: [facility]  Patient anticipates discharging to: [facility]    ESAU Agrawal, [facility]  [phone]  [facility]

## 2020-06-12 NOTE — PLAN OF CARE
Pt alert to self, place. Calm and cooperative this shift. VSS on RA, slightly hypertensive. Positive orthostatics from laying to sitting, pt unable to tolerating standing. Tele- SR w/ BBB and PAC. Pain controlled w/ scheduled Tylenol. Luis in place. L shoulder immobilizer in place. Up Ax2 gb/walker. Discharge back to TCU pending.

## 2020-06-15 NOTE — LETTER
To:             Please give to facility    From:   Farnaz Eddy RN, Care Coordinator   [facility] -Care Coordination  [facility]   E-mail [email or web address]   [phone]    Patient Name:  Dustin Pearce YOB: 1928   Admit date: 6/12/2020      *Information Needed:  Please contact me when the patient will discharge (or if they will move to long term care)- include the discharge date, disposition, and main diagnosis   - If the patient is discharged with home care services, please provide the name of the agency    Also- Please inform me if a care conference is being held.   Farnaz Eddy RN, Care Coordinator   [facility] -Care Coordination  [facility]   E-mail [email or web address]   [phone]                              Thank you

## 2020-06-15 NOTE — PROGRESS NOTES
Clinic Care Coordination Contact  Care Coordination Transition Communication         Clinical Data: Transition to Facility:  [facility]  Hospitalist Discharge Summary       Date of Admission:  6/11/2020  Date of Discharge:  6/12/2020  Discharging Provider: Tutu Bañuelos MD           Discharge Diagnoses        Acute left shoulder dislocation  Syncope -> suspect from orthostatic hypotension              [facility]              Contact name and phone number/fax: CC faxed contact information to the facility to call when discharged from TCU    Plan: RN/SW Care Coordinator will await notification from facility staff informing RN/SW Care Coordinator of patient's discharge plans/needs. RN/SW Care Coordinator will review chart and outreach to facility staff every 4 weeks and as needed.     [facility]     Farnaz Eddy RN Care Coordinator   [facility]   Phone: [phone]  Email :  [email or web address]

## 2020-06-16 PROBLEM — R55 SYNCOPE: Status: ACTIVE | Noted: 2020-01-01

## 2020-06-16 PROBLEM — S42.92XD: Status: ACTIVE | Noted: 2020-01-01

## 2020-06-16 PROBLEM — R33.9 URINARY RETENTION: Status: ACTIVE | Noted: 2020-01-01

## 2020-06-16 NOTE — Clinical Note
Hi, plz review, can you see or does he Need to be seen ?? Let me know.  He is at [facility].   Thanks b

## 2020-06-16 NOTE — PROGRESS NOTES
"   [place] [facility]  PRIMARY CARE PROVIDER AND CLINIC:  Matt Morrissey MD, [address] / [place] [address]  Chief Complaint   Patient presents with     Nursing Home Acute     ED F/U     [facility] Medical Record Number:  [id number]  Place of Service where encounter took place:  [facility] ([facility]) [[id number]]    Dustin Pearce  is a 89 year old  (1/31/1928), admitted to the above facility from  [facility]. Hospital stay 6/11-6/12/20 when he was sent after a fall and left shoulder dislocation.  Prior to that, he was at [facility] from 6/1/20 through 6/5/20..  Admitted to this facility for  rehab, medical management and nursing care.    HPI:    HPI information obtained from: facility chart records, facility staff, patient report and [facility] chart review.   6/11/20:  Brief Hospital Course:  Pt fell, ? Syncope, dislocated left shoulder, sent to ER, workup (-) but thought to be related to orthostasis.  Neuro saw also: (-)EEG,and sz meds reviewed by them.. left shoulder was reduce in ER, in sling and he was sent out on oxycodone which was dc'd due to confusion.    61-6/5/20:Brief Summary of Hospital Course:pt presented with recurrent neurological \"spells\" including facial droop and slurred speech which resolved prior to arrival to the ER.  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  He was seen by neurology, no change in meds  At one pint he has acute neuro changes including increased LUE weakness and lethargy--was sent for MRI of brain which was (-). He had Seroquel at one point for agitation and it was thought that had contribute to the lethargy.  He had low blood sugars,so Lantus dc'd and not restarted. It is thought he may need LTC placement. Per HUBER noted on 6/5, SW [facility] but she was not interested  and wanted to d/w pt's son at some point. Pt remained full code.  Noted to have worsened dysphagia so diet " downgraded to a DDL1 with NTL.  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 His metoprolol and Lisinopril dc'd in the past reji by Dr Samuel, Cards: bradycardia and did not tolerate any longer..     TODAY 6/16/20, DURING EXAM HPI and ROS:  He is alert appearing. More verbal than last meeting.   No CP, SOB, Cough, dizziness, HA, N/V, dysuria or Bowel Abnormalities. Appetite is down.  No pain except left shoulder sore.. Has blurry vision due to Mac Degen      CODE STATUS/ADVANCE DIRECTIVES DISCUSSION:   CPR/Full code   Patient's living condition: lives with Halina ARDON  ALLERGIES: Oxycodone; Sulfa drugs; and Tetracycline  PAST MEDICAL HISTORY:  has a past medical history of Abdominal aortic aneurysm (H) (12/25/2016), Acute on chronic systolic congestive heart failure (H) (6/7/2018), Anemia (6/7/2018), Anemia due to blood loss, acute (6/13/2017), Aortic stenosis, Benign essential hypertension (1/6/2017), Benign non-nodular prostatic hyperplasia with lower urinary tract symptoms (10/20/2016), CAD (coronary artery disease), Carotid artery stenosis (10/20/2016), Cerebrovascular accident (H) (10/20/2016), Cognitive impairment (6/7/2018), Coronary artery disease of native artery of native heart with stable angina pectoris (H) (10/20/2016), Depression, unspecified depression type (2/22/2018), Diabetes mellitus (H), Diabetic ulcer of left foot associated with diabetes mellitus due to underlying condition (H) (6/12/2017), 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) (6/12/2017), Gastro-oesophageal reflux disease, Gastroesophageal reflux disease without esophagitis (10/20/2016), Heart attack (H), Hyperlipidemia, Hyperlipidemia, unspecified hyperlipidemia type (10/20/2016), Ischemic cardiomyopathy, Lumbar back pain (12/30/2016), Mild cognitive impairment (10/20/2016), Nephrolithiasis, NSTEMI (non-ST elevated myocardial infarction) (H) (6/7/2018), Osteopenia, PAD (peripheral artery disease) (H),  Paroxysmal atrial fibrillation (H), Peripheral artery disease (H), RBBB with left anterior fascicular block, Slow transit constipation (2/22/2018), Stroke of unknown etiology (H) (12/31/2017), Syncope, TIA (transient ischemic attack) (1/20/2017), Unspecified cerebral artery occlusion with cerebral infarction, UTI (urinary tract infection) due to Enterococcus (2/22/2018), and Ventricular tachycardia (H). He also has no past medical history of Difficult intubation or Malignant hyperthermia.  PAST SURGICAL HISTORY:   has a past surgical history that includes Cholecystectomy; hernia repair; Abdomen surgery; Laser holmium lithotripsy ureter(s), insert stent, combined (3/2/2012); rotator cuff repair rt/lt; Esophagoscopy, gastroscopy, duodenoscopy (EGD), combined (N/A, 12/26/2016); UGI ENDOSCOPY W EUS (N/A, 12/29/2016); Amputate foot (Left, 6/4/2017); Incision and drainage foot, combined (Left, 6/6/2017); and Endarterectomy carotid (Right, 1/3/2018).  FAMILY HISTORY: family history includes Breast Cancer in his mother; Heart Failure (age of onset: 81) in his father.  SOCIAL HISTORY:   reports that he quit smoking about 21 years ago. His smoking use included cigarettes. He started smoking about 51 years ago. He has a 30.00 pack-year smoking history. He has never used smokeless tobacco. He reports previous alcohol use of about 7.0 standard drinks of alcohol per week. He reports that he does not use drugs.    Post Discharge Medication Reconciliation Status: discharge medications reconciled and changed, per note/orders (see AVS)     Current Outpatient Medications   Medication Sig Dispense Refill     Blood Glucose Monitoring Suppl FROYLAN 4 times daily (before meals and nightly)       Nutritional Supplements (NUTRITIONAL SUPPLEMENT PO) Diabetic HNS 8 oz BID btwn meals       OTHER MEDICAL SUPPLIES Shoulder immobilizer to left shoulder       acetaminophen (TYLENOL) 500 MG tablet Take 1,000 mg by mouth 2 times daily And 1000mg daily  prn also.     for pain       aspirin (ASA) 81 MG EC tablet Take 1 tablet (81 mg) by mouth daily 30 tablet 0     atorvastatin (LIPITOR) 40 MG tablet Take 1 tablet (40 mg) by mouth daily (Patient taking differently: Take 40 mg by mouth At Bedtime ) 30 tablet 0     DULoxetine (CYMBALTA) 30 MG capsule TAKE 1 CAPSULE(30 MG) BY MOUTH DAILY 30 capsule 0     ipratropium (ATROVENT) 0.03 % nasal spray INHALE 1 SPRAY IN EACH NOSTRIL EVERY 12 HOURS AS NEEDED 30 mL 11     Lacosamide (VIMPAT) 100 MG TABS tablet Take 1 tablet (100 mg) by mouth 2 times daily . Future refills by PCP Dr. Matt Morrissey with phone number [phone]. 60 tablet 0     levETIRAcetam (KEPPRA) 750 MG tablet Take 1 tablet (750 mg) by mouth 2 times daily 60 tablet 0     magnesium oxide (MAG-OX) 400 MG tablet Take 400 mg by mouth 4 times daily (with meals and nightly)       metFORMIN (GLUCOPHAGE) 1000 MG tablet Take 1 tablet (1,000 mg) by mouth 2 times daily (with meals) (Patient taking differently: Take 1,000 mg by mouth 2 times daily (with meals) Dx: supplement) 60 tablet 0     nitroGLYcerin (NITROSTAT) 0.4 MG sublingual tablet For chest pain place 1 tablet under the tongue every 5 minutes for 3 doses. If symptoms persist 5 minutes after 1st dose call 911. 25 tablet 0     omeprazole (PRILOSEC) 40 MG DR capsule TAKE 1 CAPSULE(40 MG) BY MOUTH DAILY 30 TO 60 MINUTES BEFORE A MEAL 30 capsule 0     polyethylene glycol-propylene glycol (SYSTANE ULTRA) 0.4-0.3 % SOLN ophthalmic solution Place 1 drop into both eyes daily as needed for dry eyes       polyethylene glycol-propylene glycol (SYSTANE ULTRA) 0.4-0.3 % SOLN ophthalmic solution Place 2 drops into both eyes daily Dx: Dry Eyes       sennosides (SENOKOT) 8.6 MG tablet Take 2 tablets by mouth daily Hold for loose stools       tamsulosin (FLOMAX) 0.4 MG capsule Take 1 capsule (0.4 mg) by mouth daily 30 capsule 0     ticagrelor (BRILINTA) 90 MG tablet Take 1 tablet (90 mg) by mouth 2 times daily 60 tablet 0  "          ROS:  See above under HPI    Vitals:  /80   Pulse 82   Temp 97.7  F (36.5  C)   Resp 19   Ht 1.702 m (5' 7\")   Wt 59.4 kg (131 lb)   SpO2 97%   BMI 20.52 kg/m    Exam:  GENERAL APPEARANCE:  Alert, in no distress, oriented to situation, cooperative  ENT:  Mouth with moist mucous membranes, normal hearing acuity  EYES:  Conjunctivae, lids, pupils and irises normal, poor vision: blind in left and mac deg in right with limited vision  NECK:  No adenopathy,masses or thyromegaly  RESP:  respiratory effort of chest normal, lungs clear to auscultation , no respiratory distress  CV:  Ascultation of heart done , RRR, no murmur, rub, or gallop, no edema, +1 pedal pulses bilat.  ABDOMEN:  normal bowel sounds, soft, nontender.  M/S:   FERNANDEZ equally except left arm in sling.  He is seen sitting in chair in room  SKIN:  Inspection of skin and subcutaneous tissue baseline but limited exam as pt fully dressed. Fading bruises on face and left upper arm  NEURO:   Cranial nerves 2-12 are grossly intact and at patient's baseline  PSYCH:  oriented X 3, memory impaired , affect and mood normal      Lab/Diagnostic data:   Recent Labs   Lab Test 06/11/20  0144 06/03/20  0744    139   POTASSIUM 4.4 3.8   CHLORIDE 102 109   CO2 26 25   ANIONGAP 8 5   * 59*   BUN 23 19   CR 0.76 0.86   ALLISON 9.3 8.6       Recent Labs   Lab Test 06/11/20  0144   WBC 8.3   RBC 4.10*   HGB 11.5*   HCT 36.4*   MCV 89   MCH 28.0   MCHC 31.6   RDW 15.0          ASSESSMENT / PLAN:  (S42.92XD) Traumatic closed displaced fracture of left shoulder with anterior dislocation with routine healing, subsequent encounter: 6/11/20  (primary encounter diagnosis)  (R53.81) Physical deconditioning  Comment/Plan: Will ask SONIDO Huang to review case from Ortho standpoint.  No ortho f/u ordered from hospital.   Minimal pain add scheduled tylenol, monitor.    (R55) Syncope, unspecified syncope type  Comment/Plan: likely cause of fall, difficult as " he has had w/u with obvious causes, monitor.    (G40.909) Seizure disorder (H)  Comment/Plan: cont sz meds, f/u neruo per their recs    (E11.8,  Z79.4) Type 2 diabetes mellitus with complication, with long-term current use of insulin (H)  Comment/Plan: Glucophage, accuchecks, monitor.    CV ISSUES:  (I25.118) Coronary artery disease of native artery of native heart with stable angina pectoris (H)   (I10) Hypertension, unspecified type   (Z86.73) History of CVA (cerebrovascular accident)  Comment/Plan: Cont all current meds, monitor.  Asa, Brilinta, statin.    (N40.1) Benign non-nodular prostatic hyperplasia with lower urinary tract symptoms   (R33.9) Urinary retention  Comment/Plan: ruiz for now, ? If can discontinue it in future. F/u Urology if nec      Electronically signed by:  ASPEN Montes CNP

## 2020-06-17 NOTE — PROGRESS NOTES
Ortho Nursing home visit    Dustin Pearce is a 89 year old male who resides at [facility]    Patient is seen today for Left anterior shoulder dislocation, treated closed into shoulder immobilizer, patient is seen today on-site at TCU for Rec; regarding treatment going forward;;      Past Medical History:   Diagnosis Date     Abdominal aortic aneurysm (H) 12/25/2016     Acute on chronic systolic congestive heart failure (H) 6/7/2018     Anemia 6/7/2018     Anemia due to blood loss, acute 6/13/2017     Aortic stenosis     mild     Benign essential hypertension 1/6/2017     Benign non-nodular prostatic hyperplasia with lower urinary tract symptoms 10/20/2016     CAD (coronary artery disease)     MI 1970     Carotid artery stenosis 10/20/2016    chronically occluded left internal carotid artery     Cerebrovascular accident (H) 10/20/2016     Cognitive impairment 6/7/2018     Coronary artery disease of native artery of native heart with stable angina pectoris (H) 10/20/2016    MI 1970     Depression, unspecified depression type 2/22/2018     Diabetes mellitus (H)      Diabetic ulcer of left foot associated with diabetes mellitus due to underlying condition (H) 6/12/2017     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     Gastro-oesophageal reflux disease      Gastroesophageal reflux disease without esophagitis 10/20/2016     Heart attack (H)      Hyperlipidemia      Hyperlipidemia, unspecified hyperlipidemia type 10/20/2016     Ischemic cardiomyopathy      Lumbar back pain 12/30/2016     Mild cognitive impairment 10/20/2016     Nephrolithiasis     RECURRENT     NSTEMI (non-ST elevated myocardial infarction) (H) 6/7/2018     Osteopenia      PAD (peripheral artery disease) (H)     peripheral angiogram 5/2017: The common iliac artery stenoses were stented and the superficial femoral artery stenoses were angioplastied     Paroxysmal atrial fibrillation (H)      Peripheral artery disease (H)     peripheral  angiogram 5/2017: The common iliac artery stenoses were stented and the superficial femoral artery stenoses were angioplastied     RBBB with left anterior fascicular block      Slow transit constipation 2/22/2018     Stroke of unknown etiology (H) 12/31/2017     Syncope      TIA (transient ischemic attack) 1/20/2017     Unspecified cerebral artery occlusion with cerebral infarction      UTI (urinary tract infection) due to Enterococcus 2/22/2018     Ventricular tachycardia (H)     nonsustained      Past Surgical History:   Procedure Laterality Date     AMPUTATE FOOT Left 6/4/2017    Procedure: AMPUTATE FOOT;  Sun Add#3: partial left foot amputation - Sagital Saw - Mini C-Arm;  Surgeon: Moe Kirk DPM;  Location:  OR     CHOLECYSTECTOMY       ENDARTERECTOMY CAROTID Right 1/3/2018    Procedure: ENDARTERECTOMY CAROTID;  RIGHT CAROTID ENDARTERECTOMY WITH EEG;  Surgeon: Rolnad Rodriguez MD;  Location:  OR     ESOPHAGOSCOPY, GASTROSCOPY, DUODENOSCOPY (EGD), COMBINED N/A 12/26/2016    Procedure: COMBINED ESOPHAGOSCOPY, GASTROSCOPY, DUODENOSCOPY (EGD);  Surgeon: Nasim Louis MD;  Location:  GI     GENITOURINARY SURGERY      KIDNEY STONE REMOVAL X 4     HC UGI ENDOSCOPY W EUS N/A 12/29/2016    Procedure: COMBINED ENDOSCOPIC ULTRASOUND, ESOPHAGOSCOPY, GASTROSCOPY, DUODENOSCOPY (EGD);  Surgeon: Nasim Louis MD;  Location:  GI     HERNIA REPAIR       INCISION AND DRAINAGE FOOT, COMBINED Left 6/6/2017    Procedure: COMBINED INCISION AND DRAINAGE FOOT;  REVISIONAL LEFT FOOT INCISION AND DRAINAGE WITH POSSIBLE FLAP CLOSURE , ANTIBIOTIC SOLUTION ;  Surgeon: Nabil Ann DPM;  Location:  OR     LASER HOLMIUM LITHOTRIPSY URETER(S), INSERT STENT, COMBINED  3/2/2012    Procedure:COMBINED CYSTOSCOPY, URETEROSCOPY, LASER HOLMIUM LITHOTRIPSY URETER(S), INSERT STENT; CYSTOSCOPY, RIGHT RETROGRADES, RIGHT URETEROSCOPY, HOLMIUM LASER, RIGHT STENT PLACEMENT; Surgeon:ANJELICA LEÓN;  "Location:[facility] OR     ROTATOR CUFF REPAIR RT/LT          Allergies   Allergen Reactions     Oxycodone Other (See Comments)     \"TERRIBLE SWEATING\"     Sulfa Drugs      Tetracycline       There were no vitals taken for this visit.     Exam: Supine, and lateral exam, shoulder immobilizer removed, allows PROM to left UE< WNL< no instability or pain noted with ER/ IR/ Abduction; or FE;      X-rays show : reduction of left anterior dislocation:    ASSESSMENT / PLAN: Will Discontinue shoulder immobilizer, and allow WBAT with walker, avoid ER/ and overhead use of left UE.    Return to activity as tolerated :  No future imaging needed unless change in condition;    [id number]          [facility]  [phone] Cell    "

## 2020-06-19 PROBLEM — I95.1 ORTHOSTATIC HYPOTENSION: Status: ACTIVE | Noted: 2020-01-01
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
## 2020-06-19 NOTE — Clinical Note
Plz review.  Could his SZ meds be a factor in orthostasis and falls?  He does not feel dizzy though when it drops--they have checked it.  THoughts?

## 2020-06-19 NOTE — PROGRESS NOTES
[place] [facility]    Chief Complaint   Patient presents with     Nursing Home Acute     Fall        [facility] Medical Record Number:  [id number]  Place of Service where encounter took place:  [facility] ([facility]) [[id number]]    HPI:    Dustin Pearce is a 89 year old  (1/31/1928), who is being seen today for an episodic care visit.  HPI information obtained from: facility chart records, facility staff, patient report and [facility] chart review.Today's concern is: fell again on 6/16 but no injury noted--no apparent reason.  Yesterday on 6/17, staff called that pt was orthostatic but pt did not feel it--dropped into high 70's when stood.  He was and is still not taking a lot po     Fall, initial encounter  Orthostatic hypotension  Traumatic closed displaced fracture of left shoulder with anterior dislocation with routine healing, subsequent encounter  Physical deconditioning  Seizure disorder (H)  Type 2 diabetes mellitus with complication, with long-term current use of insulin (H)  History of CVA (cerebrovascular accident)  Benign non-nodular prostatic hyperplasia with lower urinary tract symptoms      ALLERGIES: Oxycodone; Sulfa drugs; and Tetracycline  Past Medical, Surgical, Family and Social History reviewed and updated in [facility].    Current Outpatient Medications   Medication Sig Dispense Refill     acetaminophen (TYLENOL) 500 MG tablet Take 1,000 mg by mouth 2 times daily And 1000mg daily prn also.     for pain       aspirin (ASA) 81 MG EC tablet Take 1 tablet (81 mg) by mouth daily 30 tablet 0     atorvastatin (LIPITOR) 40 MG tablet Take 1 tablet (40 mg) by mouth daily (Patient taking differently: Take 40 mg by mouth At Bedtime ) 30 tablet 0     Blood Glucose Monitoring Suppl FROYLAN 4 times daily (before meals and nightly)       DULoxetine (CYMBALTA) 30 MG capsule TAKE 1 CAPSULE(30 MG) BY MOUTH DAILY 30 capsule 0     ipratropium (ATROVENT) 0.03 % nasal spray INHALE 1 SPRAY IN EACH  NOSTRIL EVERY 12 HOURS AS NEEDED 30 mL 11     Lacosamide (VIMPAT) 100 MG TABS tablet Take 1 tablet (100 mg) by mouth 2 times daily . Future refills by PCP Dr. Matt Morrissey with phone number [phone]. 60 tablet 0     levETIRAcetam (KEPPRA) 750 MG tablet Take 1 tablet (750 mg) by mouth 2 times daily 60 tablet 0     magnesium oxide (MAG-OX) 400 MG tablet Take 400 mg by mouth 4 times daily (with meals and nightly)       metFORMIN (GLUCOPHAGE) 1000 MG tablet Take 1 tablet (1,000 mg) by mouth 2 times daily (with meals) (Patient taking differently: Take 1,000 mg by mouth 2 times daily (with meals) Dx: supplement) 60 tablet 0     nitroGLYcerin (NITROSTAT) 0.4 MG sublingual tablet For chest pain place 1 tablet under the tongue every 5 minutes for 3 doses. If symptoms persist 5 minutes after 1st dose call 911. 25 tablet 0     Nutritional Supplements (NUTRITIONAL SUPPLEMENT PO) Diabetic HNS 8 oz BID btwn meals       omeprazole (PRILOSEC) 40 MG DR capsule TAKE 1 CAPSULE(40 MG) BY MOUTH DAILY 30 TO 60 MINUTES BEFORE A MEAL 30 capsule 0     OTHER MEDICAL SUPPLIES Shoulder immobilizer to left shoulder       polyethylene glycol-propylene glycol (SYSTANE ULTRA) 0.4-0.3 % SOLN ophthalmic solution Place 1 drop into both eyes daily as needed for dry eyes       polyethylene glycol-propylene glycol (SYSTANE ULTRA) 0.4-0.3 % SOLN ophthalmic solution Place 2 drops into both eyes daily Dx: Dry Eyes       sennosides (SENOKOT) 8.6 MG tablet Take 2 tablets by mouth daily Hold for loose stools       tamsulosin (FLOMAX) 0.4 MG capsule Take 1 capsule (0.4 mg) by mouth daily 30 capsule 0     ticagrelor (BRILINTA) 90 MG tablet Take 1 tablet (90 mg) by mouth 2 times daily 60 tablet 0     Medications reviewed:  Medications reconciled to facility chart and changes were made to reflect current medications as identified as above med list. Below are the changes that were made:   Medications stopped since last EPIC medication reconciliation:  "  There are no discontinued medications.    Medications started since last [facility] medication reconciliation:  No orders of the defined types were placed in this encounter.    TODAY DURING EXAM/ROS:  Minimal answers but appropriate. Sometimes jokes.  No CP, SOB, Cough, dizziness,   HA, N/V, dysuria or Bowel Abnormalities. Appetite is down.  No pain.    Physical Exam:  BP (!) 142/83   Pulse 68   Temp 97.6  F (36.4  C)   Resp 18   Ht 1.702 m (5' 7\")   Wt 58.2 kg (128 lb 3.2 oz)   SpO2 97%   BMI 20.08 kg/m     GENERAL APPEARANCE:  Alert, in no distress, oriented to situation, cooperative  ENT:  Mouth with moist mucous membranes, normal hearing  RESP:  respiratory effort of chest normal, lungs clear to auscultation , no respiratory distress  CV:  Ascultation of heart done , RRR, no murmur, rub, or gallop, no edema, +1 pedal pulses bilat.  ABDOMEN:  normal bowel sounds, soft, nontender.  M/S:   FERNANDEZ equally except left arm in sling.  He is sitting in chair in room  : Boss draining clear yellow urine.  SKIN:  Inspection of skin is at baseline but limited exam as pt fully dressed. Fading bruises on face and left upper arm   NEURO:   Cranial nerves 2-12 are grossly intact and at patient's baseline  PSYCH:  oriented X 3, memory impaired , affect and mood normal         CBC RESULTS:   Recent Labs   Lab Test 06/11/20  0144 06/04/20  0730   WBC 8.3 8.6   RBC 4.10* 3.59*   HGB 11.5* 10.3*   HCT 36.4* 32.5*   MCV 89 91   MCH 28.0 28.7   MCHC 31.6 31.7   RDW 15.0 15.5*    172       Last Basic Metabolic Panel:  Recent Labs   Lab Test 06/11/20  0144 06/03/20  0744    139   POTASSIUM 4.4 3.8   CHLORIDE 102 109   ALLISON 9.3 8.6   CO2 26 25   BUN 23 19   CR 0.76 0.86   * 59*       Liver Function Studies -   Recent Labs   Lab Test 06/01/20  0336 05/01/20  1005   PROTTOTAL 6.8 6.5*   ALBUMIN 3.3* 3.3*   BILITOTAL 0.6 0.9   ALKPHOS 125 97   AST 36 15   ALT 28 18       Lab Results   Component Value Date    A1C 6.3 " 06/01/2020    A1C 8.3 03/05/2020     ASSESSMENT / PLAN:  (W19.XXXA) Fall, initial encounter  (primary encounter diagnosis)  Comment/Plan: fell again this am--no apparent injury--?if orthostasis plays a part also with his weakness.  He is on no BP meds and eating marginally at times. Will encourage po, consider midodrine or florinef--will d/w DR Barone    I95.1 Orthostatic Hypotension  Comment/Plan: on no meds, could his SZ meds be a factor?    (S42.92XD) Traumatic closed displaced fracture of left shoulder with anterior dislocation with routine healing, subsequent encounter: 6/11/20  Comment/Plan: appreciate SONIDO Huang PA-C visit on 6/17-- PT OT as able, see below    (R53.81) Physical deconditioning  Comment/Plan:  PT OT    (G40.909) Seizure disorder (H)  Comment/Plan: quiescent, cont same Poc, no issues, monitor.    (E11.8,  Z79.4) Type 2 diabetes mellitus with complication, with long-term current use of insulin (H)  Comment/Plan: mostly high 100's no changes today, cont Metformin, monitor.    (Z86.73) History of CVA (cerebrovascular accident)  Comment/Plan: asa  and Brilinta    (N40.1) Benign non-nodular prostatic hyperplasia with lower urinary tract symptoms  Comment/Plan: ruiz for now,  Flomax, trial dc when able, monitor.      Electronically signed by  ASPEN Montes CNP

## 2020-06-23 NOTE — PROGRESS NOTES
[facility]    Chief Complaint   Patient presents with     halfway Acute       [facility] Medical Record Number:  [id number]  Place of Service where encounter took place:  [facility] ([facility]) [[id number]]    HPI:    Dustin Pearce is a 89 year old  (1/31/1928), who is being seen today for an episodic care visit.  HPI information obtained from: facility chart records, facility staff, patient report and [facility] chart review.Today's concern is: no recent falls, is eating a little better. Seen sitting in chair in room, feels stronger. No new BP drops noted.       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    Current Outpatient Medications   Medication Sig Dispense Refill     acetaminophen (TYLENOL) 500 MG tablet Take 1,000 mg by mouth 2 times daily And 1000mg daily prn also.     for pain       aspirin (ASA) 81 MG EC tablet Take 1 tablet (81 mg) by mouth daily 30 tablet 0     atorvastatin (LIPITOR) 40 MG tablet Take 1 tablet (40 mg) by mouth daily (Patient taking differently: Take 40 mg by mouth At Bedtime ) 30 tablet 0     Blood Glucose Monitoring Suppl FROYLAN 4 times daily (before meals and nightly)       DULoxetine (CYMBALTA) 30 MG capsule TAKE 1 CAPSULE(30 MG) BY MOUTH DAILY 30 capsule 0     ipratropium (ATROVENT) 0.03 % nasal spray INHALE 1 SPRAY IN EACH NOSTRIL EVERY 12 HOURS AS NEEDED 30 mL 11     Lacosamide (VIMPAT) 100 MG TABS tablet Take 1 tablet (100 mg) by mouth 2 times daily .  Future refills by PCP Dr. Matt Morrissey with phone number [phone]. 60 tablet 0     levETIRAcetam (KEPPRA) 750 MG tablet Take 1 tablet (750 mg) by mouth 2 times daily 60 tablet 0     magnesium oxide (MAG-OX) 400 MG tablet Take 400 mg by mouth 4 times daily (with meals and nightly)       metFORMIN (GLUCOPHAGE) 1000 MG tablet Take 1 tablet (1,000 mg) by mouth 2 times daily (with meals) (Patient taking differently: Take 1,000 mg by mouth 2 times daily (with meals) Dx: supplement) 60 tablet 0     nitroGLYcerin (NITROSTAT) 0.4 MG sublingual tablet For chest pain place 1 tablet under the tongue every 5 minutes for 3 doses. If symptoms persist 5 minutes after 1st dose call 911. 25 tablet 0     Nutritional Supplements (NUTRITIONAL SUPPLEMENT PO) Diabetic HNS 8 oz BID btwn meals       omeprazole (PRILOSEC) 40 MG DR capsule TAKE 1 CAPSULE(40 MG) BY MOUTH DAILY 30 TO 60 MINUTES BEFORE A MEAL 30 capsule 0     OTHER MEDICAL SUPPLIES Shoulder immobilizer to left shoulder       polyethylene glycol-propylene glycol (SYSTANE ULTRA) 0.4-0.3 % SOLN ophthalmic solution Place 1 drop into both eyes daily as needed for dry eyes       polyethylene glycol-propylene glycol (SYSTANE ULTRA) 0.4-0.3 % SOLN ophthalmic solution Place 2 drops into both eyes daily Dx: Dry Eyes       sennosides (SENOKOT) 8.6 MG tablet Take 2 tablets by mouth daily Hold for loose stools       tamsulosin (FLOMAX) 0.4 MG capsule Take 1 capsule (0.4 mg) by mouth daily 30 capsule 0     ticagrelor (BRILINTA) 90 MG tablet Take 1 tablet (90 mg) by mouth 2 times daily 60 tablet 0     Medications reviewed:  Medications reconciled to facility chart and changes were made to reflect current medications as identified as above med list. Below are the changes that were made:   Medications stopped since last EPIC medication reconciliation:   There are no discontinued medications.    Medications started since last [facility] medication reconciliation:  No orders of the defined types  "were placed in this encounter.    TODAY DURING EXAM/ROS:  Minimally conversant but answers but appropriate.   No CP, SOB, Cough, dizziness,   HA, N/V, dysuria or Bowel Abnormalities. Appetite is a little better. Says left arm still sore--shoulder area but otherwise has no pain.    Physical Exam:  /63   Pulse 90   Temp 96.3  F (35.7  C)   Resp 20   Ht 1.702 m (5' 7\")   Wt 58.7 kg (129 lb 4.8 oz)   SpO2 98%   BMI 20.25 kg/m    GENERAL APPEARANCE:  Alert, in no distress, oriented to situation, cooperative  ENT:  Mouth with moist mucous membranes, normal hearing  RESP:  respiratory effort of chest normal, lungs CTA , no respiratory distress  CV:  Ascultation of heart done , RRR, no m,g,r, no edema, +1 pedal pulses bilat.  ABDOMEN:  normal bowel sounds, soft, nontender.  M/S:   FERNANDEZ equally except left arm is less mobile. He is sitting in chair in room  : Luis draining clear yellow urine.  SKIN:  Inspection of skin is at baseline but limited exam as pt fully dressed. Bruises cont to fade on face and left upper arm   NEURO:   Cranial nerves 2-12 are grossly intact and at patient's baseline  PSYCH:  oriented X 3, memory impaired , affect and mood normal         CBC RESULTS:     Recent Labs   Lab Test 06/11/20  0144 06/04/20  0730   WBC 8.3 8.6   RBC 4.10* 3.59*   HGB 11.5* 10.3*   HCT 36.4* 32.5*   MCV 89 91   MCH 28.0 28.7   MCHC 31.6 31.7   RDW 15.0 15.5*    172       Last Basic Metabolic Panel:  Recent Labs   Lab Test 06/11/20  0144 06/03/20  0744    139   POTASSIUM 4.4 3.8   CHLORIDE 102 109   ALLISON 9.3 8.6   CO2 26 25   BUN 23 19   CR 0.76 0.86   * 59*       Liver Function Studies -   Recent Labs   Lab Test 06/01/20  0336 05/01/20  1005   PROTTOTAL 6.8 6.5*   ALBUMIN 3.3* 3.3*   BILITOTAL 0.6 0.9   ALKPHOS 125 97   AST 36 15   ALT 28 18       Lab Results   Component Value Date    A1C 6.3 06/01/2020    A1C 8.3 03/05/2020     ASSESSMENT / PLAN:  (S42.92XD) Traumatic closed displaced " fracture of left shoulder with anterior dislocation with routine healing, subsequent encounter: 6/11/20  (R53.81) Physical deconditioning  Comment/Plan: improving, less pain, cont PT OT    (G40.909) Seizure disorder (H)  Comment/Plan: quiescent, cont same Poc, no issues, monitor.    (E11.8,  Z79.4) Type 2 diabetes mellitus with complication, with long-term current use of insulin (H)  Comment/Plan: mostly mid to high 100's,  cont Metformin, monitor.    (Z86.73) History of CVA (cerebrovascular accident)  Comment/Plan: asa  and Brilinta.  SBP running 120-140's mostly    (N40.1) Benign non-nodular prostatic hyperplasia with lower urinary tract symptoms  Comment/Plan: ruiz for now, cont Flomax, consider trial dc when able, monitor.    I95.1 Orthostatic Hypotension  Comment/Plan: none recently,  could his SZ meds be a factor and decreased Po?      Electronically signed by  ASPEN Montes CNP

## 2020-07-01 PROBLEM — Z97.8 FOLEY CATHETER IN PLACE: Status: ACTIVE | Noted: 2020-01-01

## 2020-07-01 NOTE — PROGRESS NOTES
[facility]    Chief Complaint   Patient presents with     jail Acute       [place] Medical Record Number:  [id number]  Place of Service where encounter took place:  [facility] ([facility]) [[id number]]    HPI:    Dustin Pearce is a 89 year old  (1/31/1928), who is being seen today for an episodic care visit.  HPI information obtained from: facility chart records, facility staff, patient report and [facility] chart review.Today's concern is: no recent falls, is eating a better and per staff walking with therapies, getting stronger. Had a SBP <90 last week on 6/25/20 but otherwise runs 130-150's SBP mostly       Traumatic closed displaced fracture of left shoulder with anterior dislocation with routine healing, subsequent encounter  Physical deconditioning  Seizure disorder (H)  Orthostatic hypotension  Type 2 diabetes mellitus with complication, with long-term current use of insulin (H)  History of CVA (cerebrovascular accident)  Benign non-nodular prostatic hyperplasia with lower urinary tract symptoms  Luis catheter in place        ALLERGIES: Oxycodone; Sulfa drugs; and Tetracycline  Past Medical, Surgical, Family and Social History reviewed and updated in [facility].    Current Outpatient Medications   Medication Sig Dispense Refill     acetaminophen (TYLENOL) 500 MG tablet Take 1,000 mg by mouth 2 times daily And 1000mg daily prn also.     for pain       aspirin (ASA) 81 MG EC tablet Take 1 tablet (81 mg) by mouth daily 30 tablet 0     atorvastatin (LIPITOR) 40 MG tablet Take 1 tablet (40 mg) by mouth daily (Patient taking differently: Take 40 mg by mouth At Bedtime ) 30 tablet 0     Blood Glucose Monitoring Suppl FROYLAN 4 times daily (before meals and nightly)       DULoxetine (CYMBALTA) 30 MG capsule TAKE 1 CAPSULE(30 MG) BY MOUTH DAILY 30 capsule 0     ipratropium (ATROVENT) 0.03 % nasal spray INHALE 1 SPRAY IN EACH NOSTRIL EVERY 12 HOURS AS NEEDED 30 mL 11     Lacosamide  (VIMPAT) 100 MG TABS tablet Take 1 tablet (100 mg) by mouth 2 times daily . Future refills by PCP Dr. Matt Morrissey with phone number [phone]. 60 tablet 0     levETIRAcetam (KEPPRA) 750 MG tablet Take 1 tablet (750 mg) by mouth 2 times daily 60 tablet 0     magnesium oxide (MAG-OX) 400 MG tablet Take 400 mg by mouth 4 times daily (with meals and nightly)       metFORMIN (GLUCOPHAGE) 1000 MG tablet Take 1 tablet (1,000 mg) by mouth 2 times daily (with meals) (Patient taking differently: Take 1,000 mg by mouth 2 times daily (with meals) Dx: supplement) 60 tablet 0     nitroGLYcerin (NITROSTAT) 0.4 MG sublingual tablet For chest pain place 1 tablet under the tongue every 5 minutes for 3 doses. If symptoms persist 5 minutes after 1st dose call 911. 25 tablet 0     Nutritional Supplements (NUTRITIONAL SUPPLEMENT PO) Diabetic HNS 8 oz BID btwn meals       omeprazole (PRILOSEC) 40 MG DR capsule TAKE 1 CAPSULE(40 MG) BY MOUTH DAILY 30 TO 60 MINUTES BEFORE A MEAL 30 capsule 0     polyethylene glycol-propylene glycol (SYSTANE ULTRA) 0.4-0.3 % SOLN ophthalmic solution Place 1 drop into both eyes daily as needed for dry eyes       polyethylene glycol-propylene glycol (SYSTANE ULTRA) 0.4-0.3 % SOLN ophthalmic solution Place 2 drops into both eyes daily Dx: Dry Eyes       sennosides (SENOKOT) 8.6 MG tablet Take 2 tablets by mouth daily Hold for loose stools       tamsulosin (FLOMAX) 0.4 MG capsule Take 1 capsule (0.4 mg) by mouth daily 30 capsule 0     ticagrelor (BRILINTA) 90 MG tablet Take 1 tablet (90 mg) by mouth 2 times daily 60 tablet 0     Medications reviewed:  Medications reconciled to facility chart and changes were made to reflect current medications as identified as above med list. Below are the changes that were made:   Medications stopped since last EPIC medication reconciliation:   There are no discontinued medications.    Medications started since last [facility] medication reconciliation:  No orders of the  "defined types were placed in this encounter.    TODAY DURING EXAM/ROS:  Minimally conversant but appropriate answers to questions.   No CP, SOB, Cough, dizziness,   HA, N/V, has ruiz, no Bowel Abnormalities. Appetite is  better. Says no pain.    Physical Exam:  BP (!) 156/77   Pulse 80   Temp 96.4  F (35.8  C)   Resp 24   Ht 1.702 m (5' 7\")   Wt 59 kg (130 lb)   SpO2 97%   BMI 20.36 kg/m       GENERAL APPEARANCE:  Alert, in no distress, oriented to situation, cooperative  ENT:  Mouth with moist mucous membranes, normal hearing  RESP:  respiratory effort of chest normal, lungs Clear to ausculation  CV:  Ascultation of heart done , RRR, no m,g,r, no edema, +1 pedal pulses bilat.  ABDOMEN:  normal bowel sounds, soft, nontender.  M/S:   FERNANDEZ equally  And up with assist.. He is sitting in chair in room  : Ruiz draining clear yellow urine.  SKIN:  Inspection of skin is at baseline but limited exam as pt fully dressed.    NEURO:   Cranial nerves 2-12 are grossly intact and at patient's baseline  PSYCH:  oriented X 3, memory impaired , affect and mood normal         CBC RESULTS:     Recent Labs   Lab Test 06/11/20  0144 06/04/20  0730   WBC 8.3 8.6   RBC 4.10* 3.59*   HGB 11.5* 10.3*   HCT 36.4* 32.5*   MCV 89 91   MCH 28.0 28.7   MCHC 31.6 31.7   RDW 15.0 15.5*    172       Last Basic Metabolic Panel:  Recent Labs   Lab Test 06/11/20  0144 06/03/20  0744    139   POTASSIUM 4.4 3.8   CHLORIDE 102 109   ALLISON 9.3 8.6   CO2 26 25   BUN 23 19   CR 0.76 0.86   * 59*          Lab Results   Component Value Date    A1C 6.3 06/01/2020    A1C 8.3 03/05/2020     ASSESSMENT / PLAN:  (S42.92XD) Traumatic closed displaced fracture of left shoulder with anterior dislocation with routine healing, subsequent encounter: 6/11/20  (R53.81) Physical deconditioning  Comment/Plan: improving, less pain, cont PT OT, no LCD    (G40.909) Seizure disorder (H)  Comment/Plan: quiescent, cont same POC, monitor.    I95.1 " Orthostatic Hypotension  Comment/Plan: none since last week, could his SZ meds be a factor ?  Is eating and drinking better, monitor.    (E11.8,  Z79.4) Type 2 diabetes mellitus with complication, with long-term current use of insulin (H)  Comment/Plan: mostly mid to high 100's,  cont Metformin, monitor.    (Z86.73) History of CVA (cerebrovascular accident)  Comment/Plan: asa  and Brilinta.  SBP running 130-50's mostly    (N40.1) Benign non-nodular prostatic hyperplasia with lower urinary tract symptoms  (Z96.0) Ruiz catheter in place  Comment/Plan: ruiz for now, cont Flomax, consider trial discontinuation soon?, monitor.           Electronically signed by  ASPEN Montes CNP

## 2020-07-04 NOTE — ED NOTES
Bed: ED04  Expected date:   Expected time:   Means of arrival:   Comments:  535 (red)  89M Stroke/Left sided weakness/LWK 0130  [id number]

## 2020-07-04 NOTE — PROGRESS NOTES
[facility]      OUTPATIENT PHYSICAL THERAPY EVALUATION  PLAN OF TREATMENT FOR OUTPATIENT REHABILITATION  (COMPLETE FOR INITIAL CLAIMS ONLY)  Patient's Last Name, First Name, M.I.  YOB: 1928  Dustin Pearce                        Provider's Name  [facility] Medical Record No.  [id number]                               Onset Date:  07/04/20   Start of Care Date:  07/04/20      Type:     _X_PT   ___OT   ___SLP Medical Diagnosis:  TIA                        PT Diagnosis:  fall risk   Visits from SOC:  1   _________________________________________________________________________________  Plan of Treatment/Functional Goals    Planned Interventions:  ,     ,       Goals: See Physical Therapy Goals on Care Plan in [facility] electronic health record.    Therapy Frequency: (one eval)  Predicted Duration of Therapy Intervention: 1 day  _________________________________________________________________________________    I CERTIFY THE NEED FOR THESE SERVICES FURNISHED UNDER        THIS PLAN OF TREATMENT AND WHILE UNDER MY CARE     (Physician co-signature of this document indicates review and certification of the therapy plan).                Certification date from: 07/04/20, Certification date to: 07/04/20    Referring Physician: Dr. Vargas            Initial Assessment        See Physical Therapy evaluation dated 07/04/20 in Epic electronic health record.

## 2020-07-04 NOTE — ED NOTES
"[facility]  ED Nurse Handoff Report    ED Chief complaint: Fall      ED Diagnosis:   Final diagnoses:   None       Code Status: Full Code    Allergies:   Allergies   Allergen Reactions     Oxycodone Other (See Comments)     \"TERRIBLE SWEATING\"     Sulfa Drugs      Tetracycline        Patient Story: patient was found on floor by his staff at about 0245 with left side weakness.patient staff told EMS he was normal at 0138 when they did rounds.patient was able to move all extremities here in the emergency room. He was alert but confuse to time and place. Per ems patient gets around in a wheel chair  Focused Assessment:  See above    Treatments and/or interventions provided: labs and CT  Patient's response to treatments and/or interventions: ongoing     To be done/followed up on inpatient unit:  .    Does this patient have any cognitive concerns?: Forgetful    Activity level - Baseline/Home:  Wheelchair  Activity Level - Current:   Wheelchair    Patient's Preferred language: English   Needed?: No    Isolation: None  Infection: Not Applicable  Bariatric?: No    Vital Signs:   Vitals:    07/04/20 0356 07/04/20 0400 07/04/20 0415 07/04/20 0430   BP: 106/84 124/77 108/60 111/63   Pulse:  89 91 91   Resp: 16      Temp: 97.8  F (36.6  C)      TempSrc: Oral      SpO2: 95% 98% 97% 98%   Height: 1.651 m (5' 5\")          Cardiac Rhythm:     Was the PSS-3 completed:   No -  What interventions are required if any?               Family Comments: none  OBS brochure/video discussed/provided to patient/family: No              Name of person given brochure if not patient: .              Relationship to patient: .    For the majority of the shift this patient's behavior was Green.   Behavioral interventions performed were none.    ED NURSE PHONE NUMBER: [phone]       "

## 2020-07-04 NOTE — PLAN OF CARE
CR: Smoking cessation order received. Per chart, pt is a former smoker and quit in 1999. Will complete order.

## 2020-07-04 NOTE — PROGRESS NOTES
07/04/20 1109   Quick Adds   Type of Visit Initial PT Evaluation   Living Environment   Lives With facility resident   Living Environment Comment Pt came from U.   Self-Care   Usual Activity Tolerance moderate   Current Activity Tolerance moderate   Regular Exercise   (assume Pt is getting PT at TCU)   Equipment Currently Used at Home walker, rolling;wheelchair, manual   Activity/Exercise/Self-Care Comment Pt reports he uses walker, chart indicates he primarily uses wheelchair. Pt has been in hospital recently and worked on transfers to wheelchair with one assist.    Functional Level Prior   Ambulation 1-->assistive equipment   Transferring 1-->assistive equipment   Toileting 3-->assistive equipment and person   Bathing 3-->assistive equipment and person   Communication 0-->understands/communicates without difficulty   Swallowing 0-->swallows foods/liquids without difficulty   Cognition 1 - attention or memory deficits   Fall history within last six months yes   Number of times patient has fallen within last six months 3   Prior Functional Level Comment Level of assist for ADLs unknown and pt is poor historian   General Information   Onset of Illness/Injury or Date of Surgery - Date 07/04/20   Referring Physician Dr. Vargas   Patient/Family Goals Statement Pt did not state. Confused   Pertinent History of Current Problem (include personal factors and/or comorbidities that impact the POC) Pt is a 89 year old male admitted after being found at TCU with left side weakness. Pt has had frequent hospitalizations   Precautions/Limitations fall precautions   General Info Comments Pt confused but wanting to move   Cognitive Status Examination   Orientation person   Level of Consciousness alert;confused   Follows Commands and Answers Questions 100% of the time   Personal Safety and Judgment impaired   Memory impaired   Pain Assessment   Patient Currently in Pain No   Integumentary/Edema   Integumentary/Edema Comments  "catheter, some abrasions on forehead   Range of Motion (ROM)   ROM Comment Decreased left UE shoulder flexion at baseline from previous dislocation. Other extremities WFL   Strength   Strength Comments Pt demonstrated antigravity strength equal in LE, some mild decreased DF left compared to right   Bed Mobility   Bed Mobility Comments SBA using bedrail to sit EOB   Transfer Skills   Transfer Comments CGA to stand from bed and Oni for support without walker. Uses walker to stand with CGA.    Gait   Gait Comments Pt ambulates 8-10ft with Oni of one and walker.   Balance   Balance Comments Requires support for static and dynamic standing   Sensory Examination   Sensory Perception no deficits were identified   Clinical Impression   Criteria for Skilled Therapeutic Intervention evaluation only   PT Diagnosis fall risk   Influenced by the following impairments decreased balance, decreased strength, cognitive deficits   Functional limitations due to impairments assist for all functional mobility   Clinical Presentation Stable/Uncomplicated   Clinical Presentation Rationale clinical judgment   Clinical Decision Making (Complexity) Low complexity   Therapy Frequency   (one eval)   Predicted Duration of Therapy Intervention (days/wks) 1 day   Anticipated Discharge Disposition Transitional Care Facility   Risk & Benefits of therapy have been explained Yes   Patient, Family & other staff in agreement with plan of care Yes   Therapy Certification   Start of care date 07/04/20   Certification date from 07/04/20   Certification date to 07/04/20   Medical Diagnosis TIA   [facility] Differential [employer]-PAC TM \"6 Clicks\"   2016, Trustees of [facility], under license to [employer].  All rights reserved.   6 Clicks Short Forms Basic Mobility Inpatient Short Form   [facility] AM-PAC  \"6 Clicks\" V.2 Basic Mobility Inpatient Short Form   1. Turning from your back to your side while in a flat bed without using bedrails? 4 - None "   2. Moving from lying on your back to sitting on the side of a flat bed without using bedrails? 4 - None   3. Moving to and from a bed to a chair (including a wheelchair)? 3 - A Little   4. Standing up from a chair using your arms (e.g., wheelchair, or bedside chair)? 3 - A Little   5. To walk in hospital room? 3 - A Little   6. Climbing 3-5 steps with a railing? 2 - A Lot   Basic Mobility Raw Score (Score out of 24.Lower scores equate to lower levels of function) 19   Total Evaluation Time   Total Evaluation Time (Minutes) 20

## 2020-07-04 NOTE — PROGRESS NOTES
[facility]    Stroke Telephone Note    I was called by Dr. Wilmar Dunham on 07/04/20 at 0423 regarding patient Dustin Pearce. The patient is a 89 year old gentleman with known history of HTN, HLD, DM2, left frontal and R cerebellum strokes, focal onset seizures related to the left frontal stroke, chronic L ICA occlusion, and recurrent falls. He lives in a nursing home and was last observed in his usual state of health around 0130 this AM. Shortly before his arrival to the ED, he was found down with left sided weakness. This weakness has now resolved (less than 1 hour) and he is moving all extremities with equal strength per reports. His mental status is reportedly at baseline.    TPA Treatment   Not given due to minor/isolated/quickly resolving symptoms.    Endovascular Treatment  Not initiated due to low clinical suspicion of LVO    Impression  transient left sided weakness   - unclear cause of weakness at this time. He has had similar episodes in the past without clear explanation. Prior episodes of seizure were associated with aphasia (not present tonight) and thought to be related to his left frontal stroke, which would not account for left sided weakness. We can not rule out stroke or TIA with current available data, so will obtain CTH in ED and admit for observation and MRI. Further recommendations pending initial diagnostic workup.    Recommendations  - CTH/CTA in ED  - MRI brain when admitted  - admit for clinical observation  - q4h neurochecks  - continue PTA aspirin and Brilinta if no hemorrhage on CT  - continue PTA statin  - continue PTA levetiracetam  - permit hypertension if no hemorrhage on CT  - telemetry    My recommendations are based on the limited information provided on the phone by Dr. Wilmar Dunham. They are not intended to replace the clinical judgment of Dr. Wilmra Dunham which should always be utilized to provide the most appropriate care to meet the unique  needs of this patient.  I was not requested to personally see or examine the patient at this time.    The Stroke/Neurocritical Care Staff is Dr. Shaw.    Lex Marley DO  Neurocritical Care Fellow  Team [facility] *[id number]  07/04/2020

## 2020-07-04 NOTE — PHARMACY-ADMISSION MEDICATION HISTORY
Pharmacy Medication History  Admission medication history interview status for the 7/4/2020  admission is complete. See EPIC admission navigator for prior to admission medications     Medication history sources: MAR ([facility])  Medication history source reliability: Good  Adherence assessment: Good    Medication reconciliation completed by provider prior to medication history? No    Time spent in this activity: 20 minutes      Prior to Admission medications    Medication Sig Last Dose Taking? Auth Provider   acetaminophen (TYLENOL) 500 MG tablet Take 1,000 mg by mouth 2 times daily And 1000mg daily prn also.     for pain 7/3/2020 at Unknown time Yes Reported, Patient   aspirin (ASA) 81 MG EC tablet Take 1 tablet (81 mg) by mouth daily 7/3/2020 at Unknown time Yes Alexander Celestin APRN CNP   atorvastatin (LIPITOR) 40 MG tablet Take 1 tablet (40 mg) by mouth daily  Patient taking differently: Take 40 mg by mouth At Bedtime  7/3/2020 at Unknown time Yes Alexander Celestin APRN CNP   DULoxetine (CYMBALTA) 30 MG capsule TAKE 1 CAPSULE(30 MG) BY MOUTH DAILY 7/3/2020 at Unknown time Yes Stacia Petty APRN CNP   ipratropium (ATROVENT) 0.03 % nasal spray INHALE 1 SPRAY IN EACH NOSTRIL EVERY 12 HOURS AS NEEDED prn Yes Matt Morrissey MD   Lacosamide (VIMPAT) 100 MG TABS tablet Take 1 tablet (100 mg) by mouth 2 times daily . Future refills by PCP Dr. Matt Morrissey with phone number [phone]. 7/3/2020 at Unknown time Yes Brandyn Graves PA   levETIRAcetam (KEPPRA) 750 MG tablet Take 1 tablet (750 mg) by mouth 2 times daily 7/3/2020 at Unknown time Yes Alexander Celestin APRN CNP   magnesium oxide (MAG-OX) 400 MG tablet Take 400 mg by mouth 4 times daily (with meals and nightly) 7/3/2020 at Unknown time Yes Unknown, Entered By History   metFORMIN (GLUCOPHAGE) 1000 MG tablet Take 1 tablet (1,000 mg) by mouth 2 times daily (with meals)  Patient taking differently: Take 1,000  mg by mouth 2 times daily (with meals) Dx: supplement 7/3/2020 at Unknown time Yes Alexander Celestin APRN CNP   nitroGLYcerin (NITROSTAT) 0.4 MG sublingual tablet For chest pain place 1 tablet under the tongue every 5 minutes for 3 doses. If symptoms persist 5 minutes after 1st dose call 911. prn Yes Brandyn Graves PA   omeprazole (PRILOSEC) 40 MG DR capsule TAKE 1 CAPSULE(40 MG) BY MOUTH DAILY 30 TO 60 MINUTES BEFORE A MEAL 7/3/2020 at Unknown time Yes Alexander Celestin APRN CNP   polyethylene glycol-propylene glycol (SYSTANE ULTRA) 0.4-0.3 % SOLN ophthalmic solution Place 1 drop into both eyes daily as needed for dry eyes prn Yes Unknown, Entered By History   polyethylene glycol-propylene glycol (SYSTANE ULTRA) 0.4-0.3 % SOLN ophthalmic solution Place 2 drops into both eyes every evening Dx: Dry Eyes 7/3/2020 at Unknown time Yes Unknown, Entered By History   sennosides (SENOKOT) 8.6 MG tablet Take 2 tablets by mouth daily Hold for loose stools 7/3/2020 at Unknown time Yes Reported, Patient   tamsulosin (FLOMAX) 0.4 MG capsule Take 1 capsule (0.4 mg) by mouth daily 7/3/2020 at pm Yes Alexander Celestin APRN CNP   ticagrelor (BRILINTA) 90 MG tablet Take 1 tablet (90 mg) by mouth 2 times daily 7/3/2020 at Unknown time Yes Alexander Celestin APRN CNP   Blood Glucose Monitoring Suppl FROYLAN 4 times daily (before meals and nightly)   Reported, Patient   Nutritional Supplements (NUTRITIONAL SUPPLEMENT PO) Diabetic HNS 8 oz BID btwn meals   Reported, Patient

## 2020-07-04 NOTE — CONSULTS
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 ABCD2 score was 7. He has had similar episodes in the past without clear explanation and his previous seizures were associated with aphasia (not present this admission) and were thought to be related to his left frontal stroke, which would not account for left sided weakness. Head CT did not show any new hypodensity (has left frontal encephalomalacia over moderate small vessel ischemic disease b/l; CTA showed known L ICA occlusion with moderate-sever atherosclerotic burden at aortic arch - the right CCA bifurcation and intracranial circulation did not have significant stenoses). He was thus admitted for observation and MRI brain.     Overnight no acute events. Vitals stable. -130 mmHg. CBC, BMP, LFTs unremarkable.     Neuroexam remains stable.     TPA Treatment   Not given due to minor/isolated/quickly resolving symptoms.     Endovascular Treatment  Not initiated due to low clinical suspicion of LVO    Impression  TIA     Recommendations  [] F/u MRI (pending, as he needs to be screened for COVID per protocol)  [] C/w home DAPT (ASA/Brilinta) and Statin. He does have a history of atrial fibrillation documented on multiple occassions including while getting a TTE in 2017 - However, subsequent cardiology evaluation revealed that \"he was having significant PACs during the echocardiogram, which were confirmed on EKG done during a prior  hospitalization as sinus rhythm with frequent PACs\". " "Will await MRI findings for further plan.   [] Follow-up MRI Brain post COVID result (has no COVID symptoms)  [] BP goal: Normotension  [] Telemetry  [] Continue PTA Atorvastatin  [] Goal A1c < 7.0 and LDL<70      Patient Follow-up    - final recommendation pending work-up    Thank you for this consult. We will continue to follow.   The Stroke Staff is Dr. Davis .    LORRAINE SAPP MD  Vascular Neurology Fellow  To page me or covering stroke neurology team member, click here: AMCOM   Choose \"On Call\" tab at top, then search dropdown box for \"Neurology Adult\", select location, press Enter, then look for stroke/neuro ICU/telestroke.    ______________________________________________________    Past Medical History   Past Medical History:   Diagnosis Date     Abdominal aortic aneurysm (H) 12/25/2016     Acute on chronic systolic congestive heart failure (H) 6/7/2018     Anemia 6/7/2018     Anemia due to blood loss, acute 6/13/2017     Aortic stenosis     mild     Benign essential hypertension 1/6/2017     Benign non-nodular prostatic hyperplasia with lower urinary tract symptoms 10/20/2016     CAD (coronary artery disease)     MI 1970     Carotid artery stenosis 10/20/2016    chronically occluded left internal carotid artery     Cerebrovascular accident (H) 10/20/2016     Cognitive impairment 6/7/2018     Coronary artery disease of native artery of native heart with stable angina pectoris (H) 10/20/2016    MI 1970     Depression, unspecified depression type 2/22/2018     Diabetes mellitus (H)      Diabetic ulcer of left foot associated with diabetes mellitus due to underlying condition (H) 6/12/2017     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     Gastro-oesophageal reflux disease      Gastroesophageal reflux disease without esophagitis 10/20/2016     Heart attack (H)      Hyperlipidemia      Hyperlipidemia, unspecified hyperlipidemia type 10/20/2016     Ischemic cardiomyopathy      Lumbar back pain " 12/30/2016     Mild cognitive impairment 10/20/2016     Nephrolithiasis     RECURRENT     NSTEMI (non-ST elevated myocardial infarction) (H) 6/7/2018     Osteopenia      PAD (peripheral artery disease) (H)     peripheral angiogram 5/2017: The common iliac artery stenoses were stented and the superficial femoral artery stenoses were angioplastied     Paroxysmal atrial fibrillation (H)      Peripheral artery disease (H)     peripheral angiogram 5/2017: The common iliac artery stenoses were stented and the superficial femoral artery stenoses were angioplastied     RBBB with left anterior fascicular block      Slow transit constipation 2/22/2018     Stroke of unknown etiology (H) 12/31/2017     Syncope      TIA (transient ischemic attack) 1/20/2017     Unspecified cerebral artery occlusion with cerebral infarction      UTI (urinary tract infection) due to Enterococcus 2/22/2018     Ventricular tachycardia (H)     nonsustained     Past Surgical History   Past Surgical History:   Procedure Laterality Date     AMPUTATE FOOT Left 6/4/2017    Procedure: AMPUTATE FOOT;  Sun Add#3: partial left foot amputation - Sagital Saw - Mini C-Arm;  Surgeon: Moe Kirk DPM;  Location:  OR     CHOLECYSTECTOMY       ENDARTERECTOMY CAROTID Right 1/3/2018    Procedure: ENDARTERECTOMY CAROTID;  RIGHT CAROTID ENDARTERECTOMY WITH EEG;  Surgeon: Roland Rodriguez MD;  Location:  OR     ESOPHAGOSCOPY, GASTROSCOPY, DUODENOSCOPY (EGD), COMBINED N/A 12/26/2016    Procedure: COMBINED ESOPHAGOSCOPY, GASTROSCOPY, DUODENOSCOPY (EGD);  Surgeon: Nasim Louis MD;  Location:  GI     GENITOURINARY SURGERY      KIDNEY STONE REMOVAL X 4     HC UGI ENDOSCOPY W EUS N/A 12/29/2016    Procedure: COMBINED ENDOSCOPIC ULTRASOUND, ESOPHAGOSCOPY, GASTROSCOPY, DUODENOSCOPY (EGD);  Surgeon: Nasim Louis MD;  Location:  GI     HERNIA REPAIR       INCISION AND DRAINAGE FOOT, COMBINED Left 6/6/2017    Procedure: COMBINED INCISION  AND DRAINAGE FOOT;  REVISIONAL LEFT FOOT INCISION AND DRAINAGE WITH POSSIBLE FLAP CLOSURE , ANTIBIOTIC SOLUTION ;  Surgeon: Nabil Ann DPM;  Location: [facility] OR     LASER HOLMIUM LITHOTRIPSY URETER(S), INSERT STENT, COMBINED  3/2/2012    Procedure:COMBINED CYSTOSCOPY, URETEROSCOPY, LASER HOLMIUM LITHOTRIPSY URETER(S), INSERT STENT; CYSTOSCOPY, RIGHT RETROGRADES, RIGHT URETEROSCOPY, HOLMIUM LASER, RIGHT STENT PLACEMENT; Surgeon:ANJELICA LEÓN; Location:[facility] OR     ROTATOR CUFF REPAIR RT/LT       Medications   Home Meds  Prior to Admission medications    Medication Sig Start Date End Date Taking? Authorizing Provider   acetaminophen (TYLENOL) 500 MG tablet Take 1,000 mg by mouth 2 times daily And 1000mg daily prn also.     for pain   Yes Reported, Patient   aspirin (ASA) 81 MG EC tablet Take 1 tablet (81 mg) by mouth daily 5/15/20  Yes Alexander Celestin APRN CNP   atorvastatin (LIPITOR) 40 MG tablet Take 1 tablet (40 mg) by mouth daily  Patient taking differently: Take 40 mg by mouth At Bedtime  5/15/20  Yes Alexander Celestin APRN CNP   DULoxetine (CYMBALTA) 30 MG capsule TAKE 1 CAPSULE(30 MG) BY MOUTH DAILY 6/27/20  Yes Stacia Petty APRN CNP   ipratropium (ATROVENT) 0.03 % nasal spray INHALE 1 SPRAY IN EACH NOSTRIL EVERY 12 HOURS AS NEEDED 5/6/19  Yes Matt Morrissey MD   Lacosamide (VIMPAT) 100 MG TABS tablet Take 1 tablet (100 mg) by mouth 2 times daily . Future refills by PCP Dr. Matt Morrissey with phone number [phone]. 6/5/20  Yes Brandyn Graves PA   levETIRAcetam (KEPPRA) 750 MG tablet Take 1 tablet (750 mg) by mouth 2 times daily 5/15/20  Yes Alexander Celestin APRN CNP   magnesium oxide (MAG-OX) 400 MG tablet Take 400 mg by mouth 4 times daily (with meals and nightly)   Yes Unknown, Entered By History   metFORMIN (GLUCOPHAGE) 1000 MG tablet Take 1 tablet (1,000 mg) by mouth 2 times daily (with meals)  Patient taking differently: Take 1,000 mg by mouth 2 times daily  (with meals) Dx: supplement 5/15/20  Yes Alexander Celestin APRN CNP   nitroGLYcerin (NITROSTAT) 0.4 MG sublingual tablet For chest pain place 1 tablet under the tongue every 5 minutes for 3 doses. If symptoms persist 5 minutes after 1st dose call 911. 5/31/18  Yes Brandyn Graves PA   omeprazole (PRILOSEC) 40 MG DR capsule TAKE 1 CAPSULE(40 MG) BY MOUTH DAILY 30 TO 60 MINUTES BEFORE A MEAL 5/15/20  Yes Alexander Celestin APRN CNP   polyethylene glycol-propylene glycol (SYSTANE ULTRA) 0.4-0.3 % SOLN ophthalmic solution Place 1 drop into both eyes daily as needed for dry eyes   Yes Unknown, Entered By History   polyethylene glycol-propylene glycol (SYSTANE ULTRA) 0.4-0.3 % SOLN ophthalmic solution Place 2 drops into both eyes every evening Dx: Dry Eyes   Yes Unknown, Entered By History   sennosides (SENOKOT) 8.6 MG tablet Take 2 tablets by mouth daily Hold for loose stools   Yes Reported, Patient   tamsulosin (FLOMAX) 0.4 MG capsule Take 1 capsule (0.4 mg) by mouth daily 5/15/20  Yes Alexander Celestin APRN CNP   ticagrelor (BRILINTA) 90 MG tablet Take 1 tablet (90 mg) by mouth 2 times daily 5/15/20  Yes Alexander Celestin APRN CNP   Blood Glucose Monitoring Suppl FROYLAN 4 times daily (before meals and nightly)    Reported, Patient   Nutritional Supplements (NUTRITIONAL SUPPLEMENT PO) Diabetic HNS 8 oz BID btwn meals    Reported, Patient       Scheduled Meds    acetaminophen  1,000 mg Oral BID     aspirin  81 mg Oral Daily     DULoxetine  30 mg Oral Daily     insulin aspart  1-7 Units Subcutaneous TID AC     insulin aspart  1-5 Units Subcutaneous At Bedtime     Lacosamide  100 mg Oral BID     levETIRAcetam  750 mg Oral BID     magnesium oxide  400 mg Oral 4x Daily w/meals     omeprazole  40 mg Oral QAM     tamsulosin  0.4 mg Oral QPM     ticagrelor  90 mg Oral BID       Infusion Meds    - MEDICATION INSTRUCTIONS -       sodium chloride 50 mL/hr at 07/04/20 1201       PRN Meds  acetaminophen,  "acetaminophen, bisacodyl, glucose **OR** dextrose **OR** glucagon, hydrALAZINE, labetalol, - MEDICATION INSTRUCTIONS -, melatonin, naloxone, ondansetron **OR** ondansetron, polyethylene glycol, prochlorperazine **OR** prochlorperazine **OR** prochlorperazine, senna-docusate **OR** senna-docusate    Allergies   Allergies   Allergen Reactions     Oxycodone Other (See Comments)     \"TERRIBLE SWEATING\"     Sulfa Drugs      Tetracycline      Family History   Family History   Problem Relation Age of Onset     Breast Cancer Mother      Heart Failure Father 81     Social History   Social History     Tobacco Use     Smoking status: Former Smoker     Packs/day: 1.00     Years: 30.00     Pack years: 30.00     Types: Cigarettes     Start date:      Last attempt to quit: 1999     Years since quittin.5     Smokeless tobacco: Never Used   Substance Use Topics     Alcohol use: Not Currently     Alcohol/week: 7.0 standard drinks     Types: 7 Standard drinks or equivalent per week     Comment: 1 drink per biweekly     Drug use: No       Review of Systems   The 10 point Review of Systems is negative other than noted in the HPI or here.        PHYSICAL EXAMINATION  Temp:  [97.6  F (36.4  C)-97.9  F (36.6  C)] 97.9  F (36.6  C)  Pulse:  [82-91] 82  Heart Rate:  [81-90] 81  Resp:  [16] 16  BP: (106-133)/(60-84) 133/65  SpO2:  [94 %-98 %] 97 %     General: sleeping in bed in no acute distress. S1 S2 heard. CTABL. Abdomen is soft and NT/ND. Mood is good, affect appropriate.    Mental status: Awake, alert and oriented to person and place (says he is in a hospital, but cant say which one). Not oriented to time and situation.   Attention: Can not repeat WORLD backwards    CN:  PERRLA and brisk. EOMI. No field cut. Face is symmetric. Facial sensation is intact to pinprick in all 3 divisions bilaterally. Palate elevates symmetrically. Phonation is slow with no dsyarthria .Head turning and shoulder shrug are intact. Tongue is " midline with normal movements and no atrophy.    Motor:   4+/5 in all 4 limbs    Sensory:  Intact to touch, pin prick bilaterally and symmetrical.    Cerebellar:  No ataxia on FNT, HST.     Gait:   deferred     Dysphagia Screen   Passed screening, no dysarthria - Regular Diet with thin liquids  07/04/2020     Stroke Scales    NIHSS  Interval     Interval Comments     1a. Level of Consciousness 0-->Alert, keenly responsive   1b. LOC Questions 0-->Answers both questions correctly   1c. LOC Commands 0-->Performs both tasks correctly   2.   Best Gaze 0-->Normal   3.   Visual 0-->No visual loss   4.   Facial Palsy 0-->Normal symmetrical movements   5a. Motor Arm, Left 0-->No drift, limb holds 90 (or 45) degrees for full 10 secs   5b. Motor Arm, Right 0-->No drift, limb holds 90 (or 45) degrees for full 10 secs   6a. Motor Leg, Left 0-->No drift, leg holds 30 degree position for full 5 secs   6b. Motor Leg, right 0-->No drift, leg holds 30 degree position for full 5 secs   7.   Limb Ataxia 0-->Absent   8.   Sensory 0-->Normal, no sensory loss   9.   Best Language 0-->No aphasia, normal   10. Dysarthria 0-->Normal   11. Extinction and Inattention  0-->No abnormality   Total 0 (07/04/20 0842)       Imaging  I personally reviewed all imaging; relevant findings per HPI.     Lab Results Data   CBC  Recent Labs   Lab 07/04/20  0403   WBC 10.7   RBC 4.15*   HGB 11.8*   HCT 37.6*        Basic Metabolic Panel    Recent Labs   Lab 07/04/20  0403   *   POTASSIUM 4.6   CHLORIDE 97   CO2 27   BUN 22   CR 0.72   *   ALLISON 9.1     Liver Panel  Recent Labs   Lab 07/04/20  0403   PROTTOTAL 6.5*   ALBUMIN 3.3*   BILITOTAL 0.6   ALKPHOS 95   AST 25   ALT 22     INR    Recent Labs   Lab Test 07/04/20  0403 05/01/20  1005 04/18/20  1944   INR 1.02 1.03 1.08      Lipid Profile    Recent Labs   Lab Test 03/06/20  0014 08/25/19  0620 04/09/19  0626  12/18/15 12/05/14 11/22/13   CHOL 146 118 134   < > 198 160 151   HDL 52 43 47    < > 39* 48 41   LDL 69 49 64   < > 123 82 71   TRIG 123 130 113   < > 181* 149 197*   CHOLHDLRATIO  --   --   --   --  5.1* 3.3 3.7    < > = values in this interval not displayed.     A1C    Recent Labs   Lab Test 06/01/20  1022 03/05/20  1159 09/23/19  1512   A1C 6.3* 8.3* 7.6*     Troponin I  No results for input(s): TROPI in the last 168 hours.       Stroke Code / Stroke Consult Data Data

## 2020-07-04 NOTE — PROGRESS NOTES
RECEIVING UNIT ED HANDOFF REVIEW    ED Nurse Handoff Report was reviewed by: Carina Orantes RN on July 4, 2020 at 7:40 AM

## 2020-07-04 NOTE — ED TRIAGE NOTES
Patient was found on floor by staff at 0245. Per EMS patient had left side weakness. Last seen at 0138 and patient was acting normal. He is alert here but has some confusion to place and time. He is able to move all 4 extremities.

## 2020-07-04 NOTE — PLAN OF CARE
Discharge Planner PT   Patient plan for discharge: Pt did not state  Current status: Orders received. Chart reviewed. Evaluation completed. Pt is a 89 year old male admitted on OBS from TCU after being found on floor with left side weakness. Pt has had frequent hospitalizations. Pt reports he uses walker and chart indicates pt uses wheelchair primarily. Unknown how much assist pt receives for ADLs. Pt is familiar to therapist and was discharged to TCU as one assist.    Currently pt is oriented to self only. Pt asks for girlfriend, doesn't know year or location and can't remember after being told. Pt sat EOB with SBA using bedrail. Pt stood with CGA and Oni to use walker and ambulate 8-10ft. Pt has decreased strength and ROM in left UE which is baseline after shoulder dislocation. Pt demonstrates full antigravity strength in left LE for hip flex, mild decreased ROM left DF.     Pt appears at physical baseline from last hospital discharge with increased confusion. Defer  further PT to continue at TCU  Barriers to return to prior living situation: fall risk, decreased cognition,   Recommendations for discharge: TCU  Rationale for recommendations: Pt needs one assist for mobility but is fall risk especially with increased confusion. Pt may need assistive living as assist of one.       Entered by: Chrissy Chavez 07/04/2020 11:14 AM

## 2020-07-04 NOTE — PLAN OF CARE
Here with left weakness.  Neuros stable.  Disorientated to time, exact hospital.  Left weakness resolved.  More confused mid-day after a nap, improved later.  Admitted with ruiz.  Able to ambulate with walker & gait belt but tires easily.  Plan for MRI after COVID test comes back per MRI policy.  Has bedhold at [facility] per Halina, significant other.

## 2020-07-04 NOTE — H&P
[facility]    History and Physical - Hospitalist Service       Date of Admission:  7/4/2020    Assessment & Plan   Dustin Pearce is a 89 year-old male with history of stroke, seizure disorder, carotid artery stenosis, coronary artery disease with ischemic cardiomyopathy, diabetes mellitus type 2, peripheral arterial disease, paroxysmal atrial fibrillation who presents with transient left-sided weakness.  Admitted on 7/4/2020.     Possible TIA  Seizure disorder  History of CVA  Carotid artery stenosis   Presents with transient left-sided weakness. Head CT with no acute porcess. CTA head/neck with multiple areas of stenosis. History of prior stroke as well as seizure disorder, other ddx for symptoms includes partial seizure.   - Neurology consulted  - MRI brain  - Echocardiogram with bubble negative 4/2020, EF 30-35%, will defer repeat.   - Telemetry  - LDL 69, HDL 52 3/5/20. Hold prior to admission atorvastatin while observation status, resume on discharge  - HgbA1c 6.3% 6/1/20  - PT only ordered due to observation status and presents from facility  - NPO pending bedside RN swallow evaluation, conditional diet if passes   - Continue prior to admission aspirin and ticagrelor   - Permissive hypertension; PRN anti-hypertensives ordered for severe elevations  - Continue prior to admission lacosamide, levetiracetam     Hyponatremia  Mild, not likely contributing to presentation.   - IVF  - BMP in AM     Recent left shoulder dislocation   Currently in TCU.   - Continue scheduled acetaminophen  - Outpatient follow-up with orthopedic surgery as previously planned    Left sided chest pain  Reproducible on exam, some mild bruising left side of chest.  - Rib XR ordered    Diabetes mellitus, type 2, on oral antidiabetic medications   HgbA1c 6.3% 6/2020. Prior to admission on metformin.  - Hold prior to admission metformin  - Medium sliding scale insulin   - Hypoglycemia protocol     Peripheral arterial  disease  Coronary artery disease  Ischemic cardiomyopathy  Paroxysmal atrial fibrillation   No longer on beta blocker due to orthostatic events  - Continue prior to admission ticagrelor, aspirin  - Hold statin while observation status    GERD  - Continue prior to admission PPI    Urinary retention   Presents from facility with Luis.  - Continue prior to admission tamsulosin   - Continue Luis     Depression  - Continue prior to admission duloxetine     Cognitive impairment  - Re-orient as needed  - Maintain normal day/night, sleep wake cycles  - Minimize sedating/altering medications as able  - Treat separate conditions as detailed above/below         Diet: NPO pending bedside RN evaluation, conditional diet entered  DVT Prophylaxis: Observation patient, short stay anticipated   Luis Catheter: in place, indication:    Code Status: Full code     Disposition Plan   Expected discharge:  [facility] to observation status. Anticipate discharge within 24 hours if clinically improved.   Entered: Garret Vargas MD 07/04/2020, 7:34 AM     The patient's care was discussed with the Patient and Patient's significant other.    Garret Vargas MD  [facility]    ______________________________________________________________________    Chief Complaint   Left-sided weakness    History of Present Illness   Dustin Pearce is a 89 year old male who presents with the above chief complaint.    History is obtained from the patient and review of medical record, though history from patient limited by cognitive impairment. The patient is currently residing in [facility] after an admission for left shoulder dislocation.  The patient was reportedly last seen normal by his facility staff at around 1:30 AM.  He was found later on the floor at around 2:45 AM.  EMS was called and noted left-sided weakness.    In the Emergency Department, the patient was seen by Dr. Wilmar Dunham.  Initial vital signs were a temperature of 97.8F, HR  90, /84, RR 16, SpO2 95% on room air. Work-up included head CT which was negative for acute findings. Neurology was contacted who recommended CTA head/neck and admission for MRI.  His weakness had resolved by the time of emergency department evaluation.  Hospitalists were contacted for admission to the hospital.     At present time the patient complains of mild left-sided chest pain that is worse when palpated.  He otherwise denies any headache, vision changes, focal numbness/tingling/weakness at present time.  He denies any shortness of breath.    Review of Systems    Complete 10 point review of systems assessed and negative except as noted in HPI.    Past Medical History    I have reviewed this patient's medical history and updated it with pertinent information if needed.   Past Medical History:   Diagnosis Date     Abdominal aortic aneurysm (H) 12/25/2016     Acute on chronic systolic congestive heart failure (H) 6/7/2018     Anemia 6/7/2018     Anemia due to blood loss, acute 6/13/2017     Aortic stenosis     mild     Benign essential hypertension 1/6/2017     Benign non-nodular prostatic hyperplasia with lower urinary tract symptoms 10/20/2016     CAD (coronary artery disease)     MI 1970     Carotid artery stenosis 10/20/2016    chronically occluded left internal carotid artery     Cerebrovascular accident (H) 10/20/2016     Cognitive impairment 6/7/2018     Coronary artery disease of native artery of native heart with stable angina pectoris (H) 10/20/2016    MI 1970     Depression, unspecified depression type 2/22/2018     Diabetes mellitus (H)      Diabetic ulcer of left foot associated with diabetes mellitus due to underlying condition (H) 6/12/2017     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     Gastro-oesophageal reflux disease      Gastroesophageal reflux disease without esophagitis 10/20/2016     Heart attack (H)      Hyperlipidemia      Hyperlipidemia, unspecified  hyperlipidemia type 10/20/2016     Ischemic cardiomyopathy      Lumbar back pain 12/30/2016     Mild cognitive impairment 10/20/2016     Nephrolithiasis     RECURRENT     NSTEMI (non-ST elevated myocardial infarction) (H) 6/7/2018     Osteopenia      PAD (peripheral artery disease) (H)     peripheral angiogram 5/2017: The common iliac artery stenoses were stented and the superficial femoral artery stenoses were angioplastied     Paroxysmal atrial fibrillation (H)      Peripheral artery disease (H)     peripheral angiogram 5/2017: The common iliac artery stenoses were stented and the superficial femoral artery stenoses were angioplastied     RBBB with left anterior fascicular block      Slow transit constipation 2/22/2018     Stroke of unknown etiology (H) 12/31/2017     Syncope      TIA (transient ischemic attack) 1/20/2017     Unspecified cerebral artery occlusion with cerebral infarction      UTI (urinary tract infection) due to Enterococcus 2/22/2018     Ventricular tachycardia (H)     nonsustained       Past Surgical History   I have reviewed this patient's surgical history and updated it with pertinent information if needed.  Past Surgical History:   Procedure Laterality Date     AMPUTATE FOOT Left 6/4/2017    Procedure: AMPUTATE FOOT;  Sun Add#3: partial left foot amputation - Sagital Saw - Mini C-Arm;  Surgeon: Moe Kirk DPM;  Location:  OR     CHOLECYSTECTOMY       ENDARTERECTOMY CAROTID Right 1/3/2018    Procedure: ENDARTERECTOMY CAROTID;  RIGHT CAROTID ENDARTERECTOMY WITH EEG;  Surgeon: Roland Rodriguez MD;  Location:  OR     ESOPHAGOSCOPY, GASTROSCOPY, DUODENOSCOPY (EGD), COMBINED N/A 12/26/2016    Procedure: COMBINED ESOPHAGOSCOPY, GASTROSCOPY, DUODENOSCOPY (EGD);  Surgeon: Nasim Louis MD;  Location:  GI     GENITOURINARY SURGERY      KIDNEY STONE REMOVAL X 4     HC UGI ENDOSCOPY W EUS N/A 12/29/2016    Procedure: COMBINED ENDOSCOPIC ULTRASOUND, ESOPHAGOSCOPY,  GASTROSCOPY, DUODENOSCOPY (EGD);  Surgeon: Nasim Louis MD;  Location:  GI     HERNIA REPAIR       INCISION AND DRAINAGE FOOT, COMBINED Left 2017    Procedure: COMBINED INCISION AND DRAINAGE FOOT;  REVISIONAL LEFT FOOT INCISION AND DRAINAGE WITH POSSIBLE FLAP CLOSURE , ANTIBIOTIC SOLUTION ;  Surgeon: Nabil Ann DPM;  Location:  OR     LASER HOLMIUM LITHOTRIPSY URETER(S), INSERT STENT, COMBINED  3/2/2012    Procedure:COMBINED CYSTOSCOPY, URETEROSCOPY, LASER HOLMIUM LITHOTRIPSY URETER(S), INSERT STENT; CYSTOSCOPY, RIGHT RETROGRADES, RIGHT URETEROSCOPY, HOLMIUM LASER, RIGHT STENT PLACEMENT; Surgeon:ANJELICA LEÓN; Location: OR     ROTATOR CUFF REPAIR RT/LT           Social History   I have reviewed this patient's social history and updated it with pertinent information if needed.  Social History     Tobacco Use     Smoking status: Former Smoker     Packs/day: 1.00     Years: 30.00     Pack years: 30.00     Types: Cigarettes     Start date:      Last attempt to quit: 1999     Years since quittin.5     Smokeless tobacco: Never Used   Substance Use Topics     Alcohol use: Not Currently     Alcohol/week: 7.0 standard drinks     Types: 7 Standard drinks or equivalent per week     Comment: 1 drink per biweekly     Drug use: No     Currently lives in [facility].     Family History   I have reviewed this patient's family history and updated it with pertinent information if needed.   Family History   Problem Relation Age of Onset     Breast Cancer Mother      Heart Failure Father 81       Prior to Admission Medications   Prior to Admission Medications   Prescriptions Last Dose Informant Patient Reported? Taking?   Blood Glucose Monitoring Suppl FROYLAN   Yes No   Si times daily (before meals and nightly)   DULoxetine (CYMBALTA) 30 MG capsule   No No   Sig: TAKE 1 CAPSULE(30 MG) BY MOUTH DAILY   Lacosamide (VIMPAT) 100 MG TABS tablet  Nursing Home No No   Sig: Take 1  tablet (100 mg) by mouth 2 times daily . Future refills by PCP Dr. Matt Morrissey with phone number [phone].   Nutritional Supplements (NUTRITIONAL SUPPLEMENT PO)   Yes No   Sig: Diabetic HNS 8 oz BID btwn meals   acetaminophen (TYLENOL) 500 MG tablet  Nursing Home Yes No   Sig: Take 1,000 mg by mouth 2 times daily And 1000mg daily prn also.     for pain   aspirin (ASA) 81 MG EC tablet  Nursing Home No No   Sig: Take 1 tablet (81 mg) by mouth daily   atorvastatin (LIPITOR) 40 MG tablet  Nursing Home No No   Sig: Take 1 tablet (40 mg) by mouth daily   Patient taking differently: Take 40 mg by mouth At Bedtime    ipratropium (ATROVENT) 0.03 % nasal spray  Nursing Home No No   Sig: INHALE 1 SPRAY IN EACH NOSTRIL EVERY 12 HOURS AS NEEDED   levETIRAcetam (KEPPRA) 750 MG tablet  Nursing Home No No   Sig: Take 1 tablet (750 mg) by mouth 2 times daily   magnesium oxide (MAG-OX) 400 MG tablet  Nursing Home Yes No   Sig: Take 400 mg by mouth 4 times daily (with meals and nightly)   metFORMIN (GLUCOPHAGE) 1000 MG tablet  Nursing Home No No   Sig: Take 1 tablet (1,000 mg) by mouth 2 times daily (with meals)   Patient taking differently: Take 1,000 mg by mouth 2 times daily (with meals) Dx: supplement   nitroGLYcerin (NITROSTAT) 0.4 MG sublingual tablet  Nursing Home No No   Sig: For chest pain place 1 tablet under the tongue every 5 minutes for 3 doses. If symptoms persist 5 minutes after 1st dose call 911.   omeprazole (PRILOSEC) 40 MG DR capsule  Nursing Home No No   Sig: TAKE 1 CAPSULE(40 MG) BY MOUTH DAILY 30 TO 60 MINUTES BEFORE A MEAL   polyethylene glycol-propylene glycol (SYSTANE ULTRA) 0.4-0.3 % SOLN ophthalmic solution  Nursing Home Yes No   Sig: Place 2 drops into both eyes daily Dx: Dry Eyes   polyethylene glycol-propylene glycol (SYSTANE ULTRA) 0.4-0.3 % SOLN ophthalmic solution  Nursing Home Yes No   Sig: Place 1 drop into both eyes daily as needed for dry eyes   sennosides (SENOKOT) 8.6 MG tablet  Nursing  "Home Yes No   Sig: Take 2 tablets by mouth daily Hold for loose stools   tamsulosin (FLOMAX) 0.4 MG capsule  Nursing Home No No   Sig: Take 1 capsule (0.4 mg) by mouth daily   ticagrelor (BRILINTA) 90 MG tablet  Nursing Home No No   Sig: Take 1 tablet (90 mg) by mouth 2 times daily      Facility-Administered Medications: None     Allergies   Allergies   Allergen Reactions     Oxycodone Other (See Comments)     \"TERRIBLE SWEATING\"     Sulfa Drugs      Tetracycline        Physical Exam   Vital Signs: Temp: 97.8  F (36.6  C) Temp src: Oral BP: 111/63 Pulse: 84 Heart Rate: 90 Resp: 16 SpO2: 94 % O2 Device: None (Room air)    Weight: 0 lbs 0 oz    Constitutional: NAD  Eyes: PERRL, EOMI  HENT: Oropharynx clear, MMM  Respiratory: Clear to auscultation bilaterally, good air movement, normal effort   Cardiovascular: RRR, no m/r/g. No peripheral edema.   GI: Soft, non-tender, non-distended. No rebound tenderness or guarding.   Skin: Warm, dry, no apparent rashes  Musculoskeletal: Tenderness to palpation over left chest wall with mild overlying bruising  Neurologic: Alert. Oriented to person, \"hospital\" (not specific one), not able to state date. Face symmetric. Tongue midline. Strength grossly intact and symmetric bilaterally. Finger-nose-finger testing limited on left due to limited left shoulder mobility. Heal-shin testing symmetric bilaterally.    Psychiatric: Normal affect, appropriate      Data   Data reviewed today: I reviewed all medications, new labs and imaging results over the last 24 hours. I personally reviewed the EKG tracing showing sinus rhythm with RBBB and PVCs.    Recent Labs   Lab 07/04/20  0403   WBC 10.7   HGB 11.8*   MCV 91      INR 1.02   *   POTASSIUM 4.6   CHLORIDE 97   CO2 27   BUN 22   CR 0.72   ANIONGAP 8   ALLISON 9.1   *   ALBUMIN 3.3*   PROTTOTAL 6.5*   BILITOTAL 0.6   ALKPHOS 95   ALT 22   AST 25       Recent Results (from the past 24 hour(s))   Head CT w/o contrast    Narrative "    EXAM: CT HEAD W/O CONTRAST  LOCATION: [facility]  DATE/TIME: 7/4/2020 4:57 AM    INDICATION: Head injury  COMPARISON: 06/11/2020  TECHNIQUE: Routine without IV contrast. Multiplanar reformats. Dose reduction techniques were used.    FINDINGS:  INTRACRANIAL CONTENTS: No intracranial hemorrhage, extraaxial collection, or mass effect.  No CT evidence of acute infarct. Moderate presumed chronic small vessel ischemic changes. Moderate generalized volume loss. No hydrocephalus. Moderate area of   encephalomalacia left frontal lobe.     VISUALIZED ORBITS/SINUSES/MASTOIDS: No intraorbital abnormality. No paranasal sinus mucosal disease. No middle ear or mastoid effusion.    BONES/SOFT TISSUES: No acute abnormality.      Impression    IMPRESSION:  1.  No acute intracranial process.   CTA Head Neck with Contrast    Narrative    EXAM: CTA  HEAD NECK WITH CONTRAST  LOCATION: [facility]  DATE/TIME: 7/4/2020 5:55 AM    INDICATION: Transient left-sided weakness.  COMPARISON: None.  CONTRAST: 70mL [id number]  TECHNIQUE: Head and neck CT angiogram with IV contrast. Axial helical CT images of the head and neck vessels obtained during the arterial phase of intravenous contrast administration. Axial 2D reconstructed images and multiplanar 3D MIP reconstructed   images of the head and neck vessels were performed by the technologist. Dose reduction techniques were used. All stenosis measurements made according to NASCET criteria unless otherwise specified.    FINDINGS:   HEAD CTA:  ANTERIOR CIRCULATION: No stenosis/occlusion, aneurysm, or high flow vascular malformation. Standard [place] anatomy.    POSTERIOR CIRCULATION:  Left P2 segment severe stenosis with poststenotic dilatation measuring 2 mm.    Right P2 segment mild stenosis.    Otherwise, no significant stenosis/occlusion, aneurysm, or high flow vascular malformation. Dominant right and smaller left vertebral artery contribute to a normal  basilar artery.     DURAL VENOUS SINUSES: Not well evaluated on a technical basis.      NECK CTA:  RIGHT CAROTID:   Right carotid endarterectomy likely present.    Right distal cervical ICA is tortuous.    Right distal cervical ICA just inferior to the skull base demonstrate 50% stenosis based upon NASCET criteria.    Otherwise, no measurable stenosis or dissection.    LEFT CAROTID:   Left common carotid artery origin moderate to severe stenosis.    Left mid and distal common carotid artery demonstrate moderate stenosis.    Left carotid bifurcation moderate stenosis.    Left carotid bulb, left cervical ICA, and left ICA at the skull base is occluded with reconstitution at the left distal supraclinoid ICA and left carotid terminus.      VERTEBRAL ARTERIES:  Right mid to distal V2 segment moderate stenosis likely due to extrinsic compression from spondylosis.     Otherwise, no significant stenosis/occlusion or dissection. Dominant right and smaller left vertebral arteries.    AORTIC ARCH: Classic aortic arch anatomy. Left mid subclavian artery severe stenosis. Right proximal brachiocephalic artery mild stenosis unlikely be rate limiting.    ARTERIAL PLAQUE: Extensive calcified/noncalcified plaque with multifocal small and prominent ulcerations noted within the aorta, left subclavian artery, right brachiocephalic artery, right subclavian artery, bilateral extracranial carotid arteries   (including but not limited to the carotid bifurcations and carotid bulbs), bilateral ICAs at the skull base, bilateral V4 segments.    NONVASCULAR STRUCTURES:   Dental amalgam resulting in streak artifact.     Emphysema.    Diffuse bony demineralization.    Degenerative changes noted within the spine.        Impression    IMPRESSION:   HEAD CTA:   1.  Left P2 segment severe stenosis with poststenotic dilatation measuring 2 mm.  2.  Right P2 segment mild stenosis.  3.  Remaining proximal intracranial arteries demonstrate no significant  stenosis or occlusion.       NECK CTA:  1.  Left carotid bulb, left cervical ICA, and left ICA at the skull base is occluded with reconstitution at the left distal supraclinoid ICA and left carotid terminus.  2.  Left common carotid artery origin moderate to severe stenosis.  3.  Left mid and distal common carotid artery demonstrate moderate stenosis.  4.  Left carotid bifurcation moderate stenosis.  5.  Right distal cervical ICA just inferior to the skull base demonstrate 50% stenosis based upon NASCET criteria.  6.  Right mid to distal V2 segment moderate stenosis likely due to extrinsic compression from spondylosis.

## 2020-07-04 NOTE — ED PROVIDER NOTES
History     Chief Complaint:  Fall      The history is provided by the patient and the EMS personnel.      Dustin Pearce is a 89 year old male who presents after he was found on the floor by the staff at his residence at 0245. EMS reports that the patient has left side weakness. When he was last seen around 0130, he was reportedly acting normal. Pt has no specific complaints in the ER.    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  Diabetic ulcer of left foot associated with diabetes mellitus due to underlying condition  GERD  Hyperlipidemia  Ischemic cardiomyopathy  Nephrolithiasis  NSTEMI  Osteopenia  PAD  Paroxysmal atrial fibrillation  RBBB with left anterior fascicular block  Stroke of unknown etiology  TIA  Unspecified cerebral artery occlusion with cerebral infarction  UTI due to Enterococcus  Ventricular tachycardia   Physical deconditioning  Other seizures  Facial droop  Dysarthria  Carotid endarterectomy, right  Diverticulitis  Combined visual and hearing impairment  Urinary retention  Orthostatic hypotension  Luis catheter in place    Past Surgical History:    Amputate foot, left  Cholecystectomy  Endarterectomy carotid  EGD, combined  Kidney stone removal  UGI endoscopy with EUS  Hernia repair  Incision and drainage foot, combined, left  Laser holmium lithotripsy ureter(s), insert stent, combined  Rotator cuff repair Rt/Lt    Family History:  "   Mother: breast cancer  Father: heart failure    Social History:  The patient was accompanied to the ED by EMS.  Smoking Status: former smoker  Smokeless Tobacco: never used  Alcohol Use: not currently  Drug Use: no   Marital Status:   [4]      Review of Systems   Constitutional: Negative for fever.   Respiratory: Negative for shortness of breath.    Cardiovascular: Negative for chest pain.   Gastrointestinal: Negative for abdominal pain and vomiting.   Neurological: Positive for weakness.   All other systems reviewed and are negative.      Physical Exam     Patient Vitals for the past 24 hrs:   BP Temp Temp src Pulse Heart Rate Resp SpO2 Height   07/04/20 0430 111/63 -- -- 91 -- -- 98 % --   07/04/20 0415 108/60 -- -- 91 -- -- 97 % --   07/04/20 0400 124/77 -- -- 89 -- -- 98 % --   07/04/20 0356 106/84 97.8  F (36.6  C) Oral -- 90 16 95 % 1.651 m (5' 5\")       Physical Exam  General: Appears well-developed and well-nourished.   Head: No signs of trauma.   Mouth/Throat: Oropharynx is clear and moist.   Eyes: Conjunctivae are normal. Pupils are equal, round, and reactive to light.   Neck: Normal range of motion. No nuchal rigidity. No cervical adenopathy  CV: Normal rate and regular rhythm.    Resp: Effort normal and breath sounds normal. No respiratory distress.   GI: Soft. There is no tenderness.  No rebound or guarding.  Normal bowel sounds.    MSK: Normal range of motion. No tenderness to extremities  no edema. No Calf tenderness.  Neuro: The patient is alert and oriented.  PERRLA, EOMI, strength in upper/lower extremities normal and symmetrical. Sensation normal. Speech normal.  CN2-12 intact.  Skin: Skin is warm and dry. No rash noted.       Emergency Department Course     ECG:  Indication: fall  ECG taken at 0436, ECG read at 0436 by Dr. Wilmar Dunham MD  Sinus rhythm with frequent premature ventricular complexes  Left axis deviation  Right bundle branch block  Septal infarct age " undetermined  Abnormal ECG  Rate 93 bpm. TN interval 198. QRS duration 136. QT/QTc 394/489. P-R-T axes 79 -77 66.    Imaging:  Radiology findings were communicated with the patient who voiced understanding of the findings.    Head CT w/o contrast  IMPRESSION:   1.  No acute intracranial process.   Reading per radiology     Laboratory:  Laboratory findings were communicated with the patient who voiced understanding of the findings.    CBC: HGB 11.8(L) o/w within normal limits (WBC 10.7, )  CMP: (L), glucose 155(H), albumin 3.3(L), protein total 6.5(L) o/w within normal limits (Creatinine 0.72)  INR: 1.02  Keppra (Levetiracetam) level: in process     Asymptomatic COVID-19 virus (Coronavirus) by PCR: pending     Emergency Department Course:  Past medical records, nursing notes, and vitals reviewed.    (0400)   I performed an exam of the patient as documented above.     EKG obtained in the ED, see results above.     IV was inserted and blood was drawn for laboratory testing, results above.    The patient was sent for a  head CT w/o contrast while in the emergency department, results above.     I spoke with the on call stroke neurologist regarding patient's presentation, findings, and plan of care.     A nasal swab was obtained for laboratory testing, findings above.     I spoke with Dr. Sharma of the Hospitalist service regarding patient's presentation, findings, and plan of care, who agrees to accept patient for further care, evaluation and monitoring.     Findings and plan explained to the Patient who consents to admission. Discussed the patient with Dr. Sharma, who will admit the patient to a bed for further monitoring, evaluation, and treatment.    I personally reviewed the laboratory and imaging results with the Patient and answered all related questions prior to admission.     Impression & Plan   Medical Decision Making:  Dustin Pearce is a 89-year-old gentleman who presents due to concern for left  arm weakness.  Apparently he was normal on rounds at 0130, but shortly prior to arrival they checked on him again found him on the ground and apparently his left arm was weak so EMS was called.  Upon presentation to the ER, I did not appreciate any significant weakness in the left arm compared to the right or in his lower extremities or facial droop.  His answers to questions was somewhat slow although they seemed to be overall appropriate.  On chart review, the patient has had multiple presentations that seem to be very similar.  During these work-ups, there has been concerned that they may be related to seizure activity as opposed to stroke or TIA although the patient does have a history of stroke in the past.  I did discuss the case with the on-call stroke neurologist given his symptoms had largely resolved, he would not be a candidate for TPA or other intervention, and it was decided to obtain a CT Noncon given the fall out of bed and to admit for further neurologic evaluation.  Patient has remained stable throughout his time in the ER.      Diagnosis:    ICD-10-CM    1. Transient neurologic deficit  R29.818        Disposition:  Admitted to Dr. Sharma.    Scribe Disclosure:  OLIVER, Jerry Dumont, am serving as a scribe at 4:06 AM on 7/4/2020 to document services personally performed by Wilmar Dunham MD based on my observations and the provider's statements to me.    EMERGENCY DEPARTMENT       Wilmar Dunham MD  07/04/20 7086

## 2020-07-05 NOTE — PLAN OF CARE
"Here with fall, left sided weakness, resolved.  Neuros stable.  Alert to self and \"July\".  Forgetful.  Denies pain.  Luis in place, admitted with it.  Up with assist 2, walker, gait belt.  Tolerates very limited walking distances.  Skin intact except scabs and bruising.  Plan for discharge to TCU at 1630.  "

## 2020-07-05 NOTE — PLAN OF CARE
No changes overnight. Neuros intact except for confusion. Disorientated to time, place, and situation. Left sided weakness has resolved. Luis in place with adequate urine output. Mod cho diet. VSS. Room air. Covid negative, plan to now get MRI today. MRI called and updated, they will call when ready. Plan to discharge back to TCU, possibly today.

## 2020-07-05 NOTE — PROGRESS NOTES
[facility]    Medicine Progress Note - Hospitalist Service       Date of Admission:  7/4/2020  Assessment & Plan   Dustin Pearce is a 89 year-old male with history of stroke, seizure disorder, carotid artery stenosis, coronary artery disease with ischemic cardiomyopathy, diabetes mellitus type 2, peripheral arterial disease, paroxysmal atrial fibrillation who presents with transient left-sided weakness.  Admitted on 7/4/2020.      Possible TIA  Seizure disorder  History of CVA  Carotid artery stenosis   Presents with transient left-sided weakness. Head CT with no acute porcess. CTA head/neck with multiple areas of stenosis. History of prior stroke as well as seizure disorder, other ddx for symptoms includes partial seizure.   *HgbA1c 6.3% 6/1/20  *Echocardiogram with bubble negative 4/2020, EF 30-35%, will defer repeat.   - Neurology consulted  - MRI brain pending  - Telemetry  - LDL 69, HDL 52 3/5/20. Hold prior to admission atorvastatin while observation status, resume on discharge  - Lipid panel pending 7/5  - PT only ordered due to observation status, recommend discharge back to TCU  - Continue prior to admission aspirin and ticagrelor pending MRI  - Permissive hypertension; PRN anti-hypertensives ordered for severe elevations  - Continue prior to admission lacosamide, levetiracetam   - Levetiracetam level therapeutic      Hyponatremia  Mild, not likely contributing to presentation.   - Resolved 7/5  - Discontinue IVF     Recent left shoulder dislocation   Currently in TCU.   - Continue scheduled acetaminophen  - Outpatient follow-up with orthopedic surgery as previously planned     Left sided chest pain  Reproducible on exam, some mild bruising left side of chest.  - Rib XR negative for fracture     Diabetes mellitus, type 2, on oral antidiabetic medications   HgbA1c 6.3% 6/2020. Prior to admission on metformin.  - Hold prior to admission metformin  - Medium sliding scale insulin   -  Hypoglycemia protocol      Peripheral arterial disease  Coronary artery disease  Ischemic cardiomyopathy  No longer on beta blocker due to orthostatic events  - Continue prior to admission ticagrelor, aspirin  - Hold statin while observation status, resume on discharge    Paroxysmal atrial fibrillation   Noted in medical record and on prior echocardiogram 2017. Recent Zio patch with brief premature beats and atrial/ventricular runs without significant hayde or tachyarrhythmias.  - Telemetry  - Continue DAPT pending MRI     GERD  Continue prior to admission PPI     Urinary retention   Presents from facility with Luis.  - Continue prior to admission tamsulosin   - Continue Luis      Depression  Continue prior to admission duloxetine      Cognitive impairment  - Re-orient as needed  - Maintain normal day/night, sleep wake cycles  - Minimize sedating/altering medications as able  - Treat separate conditions as detailed above/below      Diet: Consistent Carbohydrate Diet 4243-3808 Calories: Moderate Consistent CHO (4-6 CHO units/meal)    DVT Prophylaxis: Observation patient, short stay anticipated   Luis Catheter: in place, indication: (admitted with it)  Code Status: Full Code      Disposition Plan   Expected discharge: Anticipated discharge back to TCU today pending MRI results   Entered: Garret Vargas MD 07/05/2020, 8:53 AM       The patient's care was discussed with the Patient.    Garret Vargas MD  Hospitalist Service  [facility]    ______________________________________________________________________    Interval History   No acute events overnight. Patient denies any chest pain, shortness of breath, focal numbness/tingling/weakness, vision changes, difficulty swallowing.     Data reviewed today: I reviewed all medications, new labs and imaging results over the last 24 hours. I personally reviewed no images or EKG's today.    Physical Exam   Vital Signs: Temp: 97.6  F (36.4  C) Temp src:  "Oral BP: 116/59 Pulse: 85 Heart Rate: 79 Resp: 16 SpO2: 96 % O2 Device: None (Room air)    Weight: 125 lbs 14.4 oz    Constitutional: NAD  Respiratory: Clear to auscultation bilaterally, good air movement bilaterally  Cardiovascular: RRR, no m/r/g. No peripheral edema.  GI: Soft, non-tender, non-distended.   Skin/Integumen: Warm, dry  Neuro: Alert. Oriented to self, \"hospital\" (not specific one), not able to state date or reason for hospitalization. Face symmetric. Tongue midline. 5/5 upper and lower extremity strength symmetric bilaterally.      Data   Recent Labs   Lab 07/04/20  0403   WBC 10.7   HGB 11.8*   MCV 91      INR 1.02   *   POTASSIUM 4.6   CHLORIDE 97   CO2 27   BUN 22   CR 0.72   ANIONGAP 8   ALLISON 9.1   *   ALBUMIN 3.3*   PROTTOTAL 6.5*   BILITOTAL 0.6   ALKPHOS 95   ALT 22   AST 25       Recent Results (from the past 24 hour(s))   XR Ribs & Chest Left G/E 3 Views    Narrative    EXAM: XR RIBS and CHEST LT 3VW  LOCATION: [facility]  DATE/TIME: 7/4/2020 3:59 PM    INDICATION: Recent fall left-sided chest pain evaluate for rib fracture  COMPARISON: None.      Impression    IMPRESSION: No evidence for acute displaced rib fracture involving the left lower chest. No pneumothorax. Normal heart size. Lungs clear. Atherosclerotic vascular calcification thoracic aorta. Degenerative changes in the spine.     Medications     - MEDICATION INSTRUCTIONS -       sodium chloride 50 mL/hr at 07/05/20 0838       acetaminophen  1,000 mg Oral BID     aspirin  81 mg Oral Daily     DULoxetine  30 mg Oral Daily     insulin aspart  1-7 Units Subcutaneous TID AC     insulin aspart  1-5 Units Subcutaneous At Bedtime     Lacosamide  100 mg Oral BID     levETIRAcetam  750 mg Oral BID     magnesium oxide  400 mg Oral 4x Daily w/meals     omeprazole  40 mg Oral QAM     tamsulosin  0.4 mg Oral QPM     ticagrelor  90 mg Oral BID       "

## 2020-07-05 NOTE — PROGRESS NOTES
"  [facility]    Stroke Progress Note    Interval Events  No acute events. Vital stable. Completed MRI brain this morning which was unremarkable for acute stroke. Sever microangiopathy and left frontal gliosis noted on MRI brain.     Impression  Transient Ischemic Attack    Plan  [] Secondary stroke prevention. Maintain normotension and C/w PTA DAPT therapy (ASA/Brilinta) and Statin. He does have a history of atrial fibrillation documented on multiple occassions including while getting a TTE in 2017 - However, subsequent cardiology evaluation revealed that \"he was having significant PACs during the echocardiogram, which were confirmed on EKG done during a prior  hospitalization as sinus rhythm with frequent PACs\". In addition, 7 EKGs over past 4 months have been sinus rhythm.  [] He needs further assessment for A fib. Recommend rhythm monitoring with a Zio patch for 14 days and follow up with [facility] neurology in 2-4 weeks. If found to have atrial fibrillation, would recommend anticoagulation therapy at that point.   [] No further inpatient neurological work up needed at this point. We will sign off please call with questions.     The Stroke Staff is Dr. Davis.    LORRAINE SAPP MD  Vascular Neurology Fellow  To page me or covering stroke neurology team member, click here: [facility]COM   Choose \"On Call\" tab at top, then search dropdown box for \"Neurology Adult\", select location, press Enter, then look for stroke/neuro ICU/telestroke.    ______________________________________________________    Medications   Home Meds  Prior to Admission medications    Medication Sig Start Date End Date Taking? Authorizing Provider   acetaminophen (TYLENOL) 500 MG tablet Take 1,000 mg by mouth 2 times daily And 1000mg daily prn also.     for pain   Yes Reported, Patient   aspirin (ASA) 81 MG EC tablet Take 1 tablet (81 mg) by mouth daily 5/15/20  Yes Alexander Celestin APRN CNP   atorvastatin (LIPITOR) 40 MG tablet Take 1 " tablet (40 mg) by mouth daily  Patient taking differently: Take 40 mg by mouth At Bedtime  5/15/20  Yes Alexander Celestin APRN CNP   DULoxetine (CYMBALTA) 30 MG capsule TAKE 1 CAPSULE(30 MG) BY MOUTH DAILY 6/27/20  Yes Stacia Petty APRN CNP   ipratropium (ATROVENT) 0.03 % nasal spray INHALE 1 SPRAY IN EACH NOSTRIL EVERY 12 HOURS AS NEEDED 5/6/19  Yes Matt Morrissey MD   Lacosamide (VIMPAT) 100 MG TABS tablet Take 1 tablet (100 mg) by mouth 2 times daily . Future refills by PCP Dr. Matt Morrissey with phone number [phone]. 6/5/20  Yes Barndyn Graves PA   levETIRAcetam (KEPPRA) 750 MG tablet Take 1 tablet (750 mg) by mouth 2 times daily 5/15/20  Yes Alexander Celestin APRN CNP   magnesium oxide (MAG-OX) 400 MG tablet Take 400 mg by mouth 4 times daily (with meals and nightly)   Yes Unknown, Entered By History   metFORMIN (GLUCOPHAGE) 1000 MG tablet Take 1 tablet (1,000 mg) by mouth 2 times daily (with meals)  Patient taking differently: Take 1,000 mg by mouth 2 times daily (with meals) Dx: supplement 5/15/20  Yes Alexander Celestin APRN CNP   nitroGLYcerin (NITROSTAT) 0.4 MG sublingual tablet For chest pain place 1 tablet under the tongue every 5 minutes for 3 doses. If symptoms persist 5 minutes after 1st dose call 911. 5/31/18  Yes Brandyn Graves PA   omeprazole (PRILOSEC) 40 MG DR capsule TAKE 1 CAPSULE(40 MG) BY MOUTH DAILY 30 TO 60 MINUTES BEFORE A MEAL 5/15/20  Yes Alexander Celestin APRN CNP   polyethylene glycol-propylene glycol (SYSTANE ULTRA) 0.4-0.3 % SOLN ophthalmic solution Place 1 drop into both eyes daily as needed for dry eyes   Yes Unknown, Entered By History   polyethylene glycol-propylene glycol (SYSTANE ULTRA) 0.4-0.3 % SOLN ophthalmic solution Place 2 drops into both eyes every evening Dx: Dry Eyes   Yes Unknown, Entered By History   sennosides (SENOKOT) 8.6 MG tablet Take 2 tablets by mouth daily Hold for loose stools   Yes Reported, Patient    tamsulosin (FLOMAX) 0.4 MG capsule Take 1 capsule (0.4 mg) by mouth daily 5/15/20  Yes Alexander Celestin APRN CNP   ticagrelor (BRILINTA) 90 MG tablet Take 1 tablet (90 mg) by mouth 2 times daily 5/15/20  Yes Alexander Celestin APRN CNP   Blood Glucose Monitoring Suppl FROYLAN 4 times daily (before meals and nightly)    Reported, Patient   Nutritional Supplements (NUTRITIONAL SUPPLEMENT PO) Diabetic HNS 8 oz BID btwn meals    Reported, Patient       Scheduled Meds    acetaminophen  1,000 mg Oral BID     aspirin  81 mg Oral Daily     DULoxetine  30 mg Oral Daily     insulin aspart  1-7 Units Subcutaneous TID AC     insulin aspart  1-5 Units Subcutaneous At Bedtime     Lacosamide  100 mg Oral BID     levETIRAcetam  750 mg Oral BID     magnesium oxide  400 mg Oral 4x Daily w/meals     omeprazole  40 mg Oral QAM     tamsulosin  0.4 mg Oral QPM     ticagrelor  90 mg Oral BID       Infusion Meds    - MEDICATION INSTRUCTIONS -         PRN Meds  acetaminophen, acetaminophen, bisacodyl, glucose **OR** dextrose **OR** glucagon, hydrALAZINE, labetalol, - MEDICATION INSTRUCTIONS -, melatonin, naloxone, ondansetron **OR** ondansetron, polyethylene glycol, prochlorperazine **OR** prochlorperazine **OR** prochlorperazine, senna-docusate **OR** senna-docusate       PHYSICAL EXAMINATION  Temp:  [97.3  F (36.3  C)-97.9  F (36.6  C)] 97.6  F (36.4  C)  Pulse:  [74-85] 85  Heart Rate:  [79-81] 79  Resp:  [16] 16  BP: (116-156)/(55-76) 116/59  SpO2:  [96 %-97 %] 96 %     General: Sleeping in bed in no acute distress. S1 S2 heard. CTABL. Abdomen is soft and NT/ND. Mood is good, affect appropriate.    Mental status: Awake, alert and oriented to person and place (says he is in a hospital, but cant say which one). Not oriented to time and situation.   Attention: Can not repeat WORLD backwards     CN:  PERRLA and brisk. EOMI. No field cut. Face is symmetric. Facial sensation is intact to pinprick in all 3 divisions bilaterally.  Palate elevates symmetrically. Phonation is slow with no dsyarthria .Head turning and shoulder shrug are intact. Tongue is midline with normal movements and no atrophy.     Motor:   4+/5 in all 4 limbs     Sensory:  Intact to touch, pin prick bilaterally and symmetrical.     Cerebellar:  No ataxia on FNT, HST.      Gait:   deferred     Stroke Scales    NIHSS  Interval   Hospital D#2   Interval Comments     1a. Level of Consciousness 0-->Alert, keenly responsive   1b. LOC Questions 0-->Answers both questions correctly   1c. LOC Commands 0-->Performs both tasks correctly   2.   Best Gaze 0-->Normal   3.   Visual 0-->No visual loss   4.   Facial Palsy 0-->Normal symmetrical movements   5a. Motor Arm, Left 0-->No drift, limb holds 90 (or 45) degrees for full 10 secs   5b. Motor Arm, Right 0-->No drift, limb holds 90 (or 45) degrees for full 10 secs   6a. Motor Leg, Left 0-->No drift, leg holds 30 degree position for full 5 secs   6b. Motor Leg, right 0-->No drift, leg holds 30 degree position for full 5 secs   7.   Limb Ataxia 0-->Absent   8.   Sensory 0-->Normal, no sensory loss   9.   Best Language 0-->No aphasia, normal   10. Dysarthria 0-->Normal   11. Extinction and Inattention  0-->No abnormality   Total 0 (07/04/20 0842)       Imaging  I personally reviewed all imaging; relevant findings per HPI.     Lab Results Data   CBC  Recent Labs   Lab 07/04/20  0403   WBC 10.7   RBC 4.15*   HGB 11.8*   HCT 37.6*        Basic Metabolic Panel    Recent Labs   Lab 07/05/20  0845 07/04/20  0403    132*   POTASSIUM 4.1 4.6   CHLORIDE 101 97   CO2 29 27   BUN 12 22   CR 0.64* 0.72   * 155*   ALLISON 8.4* 9.1     Liver Panel  Recent Labs   Lab 07/04/20  0403   PROTTOTAL 6.5*   ALBUMIN 3.3*   BILITOTAL 0.6   ALKPHOS 95   AST 25   ALT 22     INR    Recent Labs   Lab Test 07/04/20  0403 05/01/20  1005 04/18/20  1944   INR 1.02 1.03 1.08      Lipid Profile    Recent Labs   Lab Test 07/05/20  0845 03/06/20  0014  08/25/19  0620  12/18/15 12/05/14 11/22/13   CHOL 97 146 118   < > 198 160 151   HDL 51 52 43   < > 39* 48 41   LDL 26 69 49   < > 123 82 71   TRIG 99 123 130   < > 181* 149 197*   CHOLHDLRATIO  --   --   --   --  5.1* 3.3 3.7    < > = values in this interval not displayed.     A1C    Recent Labs   Lab Test 06/01/20  1022 03/05/20  1159 09/23/19  1512   A1C 6.3* 8.3* 7.6*     Troponin I  No results for input(s): TROPI in the last 168 hours.

## 2020-07-05 NOTE — PLAN OF CARE
A but appears confused, baseline cognitive impairment. Easily redirectable. All discharge instructions reviewed with pt and wife, wife verbalized understanding. PIV removed. All belongings returned to pt. Pt stable stable at time of discharge. Zio patch on. Discharged to [facility] via WC ride with HE transport.

## 2020-07-05 NOTE — CONSULTS
Care Transition Initial Assessment - HUBER     Met with: Patient    Active Problems:    TIA (transient ischemic attack)       DATA  [facility]      Identified issues/concerns regarding health management:  Pt brought to hospital for TIA evaluation and is here under observation. MRI was negative. Pt states he is in agreement with return to [facility] later today.  HUBER contacted Marcos at [facility] and they are able to take pt back today.   HUBER spoke with Allyson at [facility] to arrange for a 2:30pm ride that was later charged to a 4:30 ride today to return to [facility] per request.      ASSESSMENT  Cognitive Status:  awake and alert  Concerns to be addressed: Pt returning to TCU     PLAN  Financial costs for the patient includes Cost of transportation. Pt aware.  Pt discharging via E w/c amlcolm [facility] at 4:30pm today. No PAS needed.    ROYA Moctezuma  [facility]  [phone]

## 2020-07-06 NOTE — PROGRESS NOTES
Clinic Care Coordination Contact  Care Coordination Transition Communication         Clinical Data:   [facility]  Hospitalist Discharge Summary       Date of Admission:  7/4/2020  Date of Discharge:  7/5/2020  Discharging Provider: Garret Vargas MD           Discharge Diagnoses     Transient ischemic attack  Seizure disorder   History of CVA  Carotid artery stenosis   Hyponatremia  Recent left shoulder dislocation   Diabetes mellitus, type 2, on oral antidiabetic medications   Peripheral arterial disease  Coronary artery disease  Ischemic cardiomyopathy  Paroxysmal atrial fibrillation   GERD  Urinary retention   Depression  Cognitive impairment     Transition to Facility:              Facility [facility]: [facility]              Contact name and phone number/fax: CC previously sent contact information the the facility to contact when discharged from TCU    Plan: RN/SW Care Coordinator will await notification from facility staff informing RN/SW Care Coordinator of patient's discharge plans/needs. RN/SW Care Coordinator will review chart and outreach to facility staff every 4 weeks and as needed.     [facility]     Farnaz Eddy RN Care Coordinator   [facility]   Phone: [phone]  Email :  [email or web address]

## 2020-07-07 NOTE — PROGRESS NOTES
[place] [facility]    Chief Complaint   Patient presents with     Hospital F/U       [facility] Medical Record Number:  [id number]  Place of Service where encounter took place:  [facility] ([facility]) [[id number]]        HPI:    Dustin Pearce is a 89 year old  (1/31/1928), who is being seen today for an episodic care visit.  HPI information obtained from: facility chart records, facility staff, patient report and [facility] chart review. Pt had a fall and ?hit head on 7/4 and was sent to the hospital on 7/4/20 and back to facility on 7/5/20 for  transient left side weakness. W/U was (-).and it was unclear if he had TIA or partial seizures.  He was found to have a mildly low Na so not thought to be a factor.  Placed on a Ziopatch and sent back to rehab.    Today's concern is: recent fall on 7/4.  er staff, pt reluctant to get up since return from hospital--says is tired.     TODAY DURING EXAM HPI and ROS:  Minimally conversant but appropriate answers to questions.  Says is tired. No CP, SOB, Cough, dizziness,  HA, N/V, has ruiz, no Bowel Abnormalities. Appetite is down. 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    Current Outpatient Medications   Medication Sig Dispense Refill     acetaminophen (TYLENOL) 500 MG tablet  Take 1,000 mg by mouth 2 times daily And 1000mg daily prn also.     for pain       aspirin (ASA) 81 MG EC tablet Take 1 tablet (81 mg) by mouth daily 30 tablet 0     atorvastatin (LIPITOR) 20 MG tablet Take 1 tablet (20 mg) by mouth At Bedtime       Blood Glucose Monitoring Suppl FROYLAN 4 times daily (before meals and nightly)       DULoxetine (CYMBALTA) 30 MG capsule TAKE 1 CAPSULE(30 MG) BY MOUTH DAILY 30 capsule 0     ipratropium (ATROVENT) 0.03 % nasal spray INHALE 1 SPRAY IN EACH NOSTRIL EVERY 12 HOURS AS NEEDED 30 mL 11     Lacosamide (VIMPAT) 100 MG TABS tablet Take 1 tablet (100 mg) by mouth 2 times daily . Future refills by PCP Dr. Matt Morrissey with phone number [phone]. 60 tablet 0     levETIRAcetam (KEPPRA) 750 MG tablet Take 1 tablet (750 mg) by mouth 2 times daily 60 tablet 0     magnesium oxide (MAG-OX) 400 MG tablet Take 400 mg by mouth 4 times daily (with meals and nightly)       metFORMIN (GLUCOPHAGE) 1000 MG tablet Take 1 tablet (1,000 mg) by mouth 2 times daily (with meals) (Patient taking differently: Take 1,000 mg by mouth 2 times daily (with meals) Dx: supplement) 60 tablet 0     nitroGLYcerin (NITROSTAT) 0.4 MG sublingual tablet For chest pain place 1 tablet under the tongue every 5 minutes for 3 doses. If symptoms persist 5 minutes after 1st dose call 911. 25 tablet 0     Nutritional Supplements (NUTRITIONAL SUPPLEMENT PO) Diabetic HNS 8 oz BID btwn meals       omeprazole (PRILOSEC) 40 MG DR capsule TAKE 1 CAPSULE(40 MG) BY MOUTH DAILY 30 TO 60 MINUTES BEFORE A MEAL 30 capsule 0     polyethylene glycol-propylene glycol (SYSTANE ULTRA) 0.4-0.3 % SOLN ophthalmic solution Place 1 drop into both eyes daily as needed for dry eyes       polyethylene glycol-propylene glycol (SYSTANE ULTRA) 0.4-0.3 % SOLN ophthalmic solution Place 2 drops into both eyes every evening Dx: Dry Eyes       sennosides (SENOKOT) 8.6 MG tablet Take 2 tablets by mouth daily Hold for loose stools       tamsulosin  "(FLOMAX) 0.4 MG capsule Take 1 capsule (0.4 mg) by mouth daily 30 capsule 0     ticagrelor (BRILINTA) 90 MG tablet Take 1 tablet (90 mg) by mouth 2 times daily 60 tablet 0     Medications reviewed:  Medications reconciled to facility chart and changes were made to reflect current medications as identified as above med list. Below are the changes that were made:   Medications stopped since last EPIC medication reconciliation:   There are no discontinued medications.    Medications started since last [facility] medication reconciliation:  No orders of the defined types were placed in this encounter.    Physical Exam:  /69   Pulse 79   Temp 97.6  F (36.4  C)   Resp 20   Ht 1.702 m (5' 7\")   Wt 58.9 kg (129 lb 12.8 oz)   SpO2 94%   BMI 20.33 kg/m       GENERAL APPEARANCE:  Appears tired., in no distress, oriented to situation, cooperative  ENT:  Mouth with moist mucous membranes, normal hearing  RESP:  respiratory effort of chest normal, lungs clear to ausculation but mildly decreased  CV:  Ascultation of heart done , RRR, no m,g,r, no edema, +1 pedal pulses bilat.  ABDOMEN:  normal bowel sounds, soft, nontender.  M/S:   FERNANDEZ equally  And up with assist.. He is lying in bed  : Luis draining clear yellow urine.  SKIN:  Inspection of skin is at baseline except bruising on face and chin--fading.  NEURO:   Cranial nerves 2-12 are grossly intact and at patient's baseline  PSYCH:  oriented X 3, memory impaired , affect and mood normal         CBC RESULTS:   Recent Labs   Lab Test 07/05/20  0845 07/04/20  0403    132*   POTASSIUM 4.1 4.6   CHLORIDE 101 97   CO2 29 27   ANIONGAP 4 8   * 155*   BUN 12 22   CR 0.64* 0.72   ALLISON 8.4* 9.1     CBC RESULTS:   Recent Labs   Lab Test 07/04/20  0403   WBC 10.7   RBC 4.15*   HGB 11.8*   HCT 37.6*   MCV 91   MCH 28.4   MCHC 31.4*   RDW 15.7*            Lab Results   Component Value Date    A1C 6.3 06/01/2020    A1C 8.3 03/05/2020       ASSESSMENT / " PLAN:  (G45.9) TIA (transient ischemic attack)  (primary encounter diagnosis)   (Z86.73) History of CVA (cerebrovascular accident)  Comment/Plan: cont Brilinta, asa, statin, f/u neurology in 2-4 weeks    (R56.9) Seizure (H)  Comment/Plan: no acute issues, cont same meds, f/u neurology    (E87.1) Hyponatremia  Comment/Plan: WNL, check 7/15    (E11.42) 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    (R41.89) Cognitive impairment  (F32.9) Depression, unspecified depression type  Comment/Plan: pt may need LTC or al--if he is not able to progress soon. Overall he seems weaker .    (S42.92XD) Traumatic closed displaced fracture of left shoulder with anterior dislocation with routine healing, subsequent encounter  Comment/Plan: f/u ortho per their recs--SONIDO Huang has seen here on .    Total time with patient visit: 45 minutes including discussions about the POC and care coordination with the first contact, REVA Meade. Greater than 50% of total time spent with counseling and coordinating care due to complexities of care.  She is very aware of his weakness and also that he may not get strong enough to go home though wishes to try.  She also agrees she may need to review POLST if he conts to decline as pt is FULL CODE.       Electronically signed by  ASPEN Montes CNP

## 2020-07-09 PROBLEM — E87.1 HYPONATREMIA: Status: ACTIVE | Noted: 2020-01-01

## 2020-07-09 PROBLEM — F32.A DEPRESSION: Status: ACTIVE | Noted: 2020-01-01

## 2020-07-09 NOTE — Clinical Note
Hi does he need further F/U?  He still has some pain--was in hospital again--but appears to be moving better. TY

## 2020-07-15 NOTE — PROGRESS NOTES
[place] [facility]    Chief Complaint   Patient presents with     shelter Acute       [facility] Medical Record Number:  [id number]  Place of Service where encounter took place:  [facility] ([facility]) [[id number]]    HPI:    Dustin Pearce is a 89 year old  (1/31/1928), who is being seen today for an episodic care visit.  HPI information obtained from: facility chart records, facility staff, patient report and [facility] chart review         TODAY DURING EXAM HPI and ROS:  Pt says feels better, less discomfort left shoulder, is hungry this am   No CP, SOB, Cough, dizziness,  HA, N/V, has ruiz, no Bowel Abnormalities.         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    Current Outpatient Medications   Medication Sig Dispense Refill     acetaminophen (TYLENOL) 500 MG tablet Take 1,000 mg by mouth 2 times daily And 1000mg daily prn also.     for pain       aspirin (ASA) 81 MG EC tablet Take 1 tablet (81 mg) by mouth daily 30 tablet 0     atorvastatin (LIPITOR) 20 MG tablet Take 1 tablet (20 mg) by mouth At Bedtime       Blood Glucose Monitoring Suppl FROYLAN 4 times daily (before meals and nightly)       DULoxetine (CYMBALTA) 30 MG capsule TAKE 1 CAPSULE(30 MG) BY MOUTH DAILY 30 capsule 0     ipratropium (ATROVENT) 0.03 % nasal spray INHALE 1 SPRAY IN EACH NOSTRIL EVERY 12 HOURS AS NEEDED 30 mL 11     Lacosamide (VIMPAT) 100 MG TABS tablet Take 1 tablet (100 mg) by mouth 2 times daily . Future refills by PCP Dr. Matt Morrissey with phone number  [phone]. 60 tablet 0     levETIRAcetam (KEPPRA) 750 MG tablet Take 1 tablet (750 mg) by mouth 2 times daily 60 tablet 0     magnesium oxide (MAG-OX) 400 MG tablet Take 400 mg by mouth 4 times daily (with meals and nightly)       metFORMIN (GLUCOPHAGE) 1000 MG tablet Take 1 tablet (1,000 mg) by mouth 2 times daily (with meals) (Patient taking differently: Take 1,000 mg by mouth 2 times daily (with meals) Dx: supplement) 60 tablet 0     nitroGLYcerin (NITROSTAT) 0.4 MG sublingual tablet For chest pain place 1 tablet under the tongue every 5 minutes for 3 doses. If symptoms persist 5 minutes after 1st dose call 911. 25 tablet 0     Nutritional Supplements (NUTRITIONAL SUPPLEMENT PO) Diabetic HNS 8 oz BID btwn meals       omeprazole (PRILOSEC) 40 MG DR capsule TAKE 1 CAPSULE(40 MG) BY MOUTH DAILY 30 TO 60 MINUTES BEFORE A MEAL 30 capsule 0     polyethylene glycol-propylene glycol (SYSTANE ULTRA) 0.4-0.3 % SOLN ophthalmic solution Place 1 drop into both eyes daily as needed for dry eyes       polyethylene glycol-propylene glycol (SYSTANE ULTRA) 0.4-0.3 % SOLN ophthalmic solution Place 2 drops into both eyes every evening Dx: Dry Eyes       sennosides (SENOKOT) 8.6 MG tablet Take 2 tablets by mouth daily Hold for loose stools       ticagrelor (BRILINTA) 90 MG tablet Take 1 tablet (90 mg) by mouth 2 times daily 60 tablet 0     Medications reviewed:  Medications reconciled to facility chart and changes were made to reflect current medications as identified as above med list. Below are the changes that were made:   Medications stopped since last EPIC medication reconciliation:   There are no discontinued medications.    Medications started since last [facility] medication reconciliation:  No orders of the defined types were placed in this encounter.    Physical Exam:  /58   Pulse 52   Temp 96.6  F (35.9  C)   Resp 17   SpO2 98%      GENERAL APPEARANCE:  Alert,in no distress, oriented to situation, cooperative  ENT:   Mouth with moist mucous membranes, normal hearing  RESP:  respiratory effort of chest normal, lungs clear to ausculation but mildly decreased  CV:  Ascultation of heart done , RRR, no m,g,r, no edema, +1 pedal pulses bilat.  ABDOMEN:  normal bowel sounds, soft, nontender.  M/S:   FERNANDEZ equally and up with assist.. sitting in bed.  : Ruiz draining clear yellow urine.  SKIN:  Inspection of skin is at baseline --bruising nearly gone on face.  NEURO:   Cranial nerves 2-12 are grossly intact and at patient's baseline  PSYCH:  oriented X 3, memory impaired , affect and mood normal         CBC RESULTS:   Recent Labs   Lab Test 07/05/20  0845 07/04/20  0403    132*   POTASSIUM 4.1 4.6   CHLORIDE 101 97   CO2 29 27   ANIONGAP 4 8   * 155*   BUN 12 22   CR 0.64* 0.72   ALLISON 8.4* 9.1     CBC RESULTS:   Recent Labs   Lab Test 07/04/20  0403   WBC 10.7   RBC 4.15*   HGB 11.8*   HCT 37.6*   MCV 91   MCH 28.4   MCHC 31.4*   RDW 15.7*            Lab Results   Component Value Date    A1C 6.3 06/01/2020    A1C 8.3 03/05/2020         ASSESSMENT / PLAN:   (Z86.73) History of CVA (cerebrovascular accident)  (R41.89) Cognitive impairment  Comment/Plan: cont Brilinta, asa, statin, f/u neurology in 2-4 weeks    (R56.9) Seizure (H)  Comment/Plan: no acute issues, cont same meds, f/u neurology    (E87.1) Hyponatremia  Comment/Plan: WNL, check 7/17, Friday    (E11.42) 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 Accuchecks running 130-180's , no changes      (I48.0) PAF (paroxysmal atrial fibrillation) (H)  Comment/Plan: cont same POC, meds, asa, statin,     (R33.9) Urinary retention  Comment/Plan: ruiz,  Will discontinue Flomax for now. If able to discontinue ruiz will resume.    (S42.92XD) Traumatic closed displaced fracture of left shoulder with anterior dislocation with routine healing, subsequent encounter  (R53.81) Physical deconditioning  Comment/Plan: I  d/w PAULINE/ Sal PA-C--no need for further Ortho f/u by him or otherwise as improving.  Will call if change in condition       Electronically signed by  ASPEN Montes CNP

## 2020-07-16 NOTE — PROGRESS NOTES
"[place] [facility]   Dustin Pearce is being evaluated via a billable video visit due to the restrictions of the Covid-19 pandemic.   The patient has been notified of following:   \"This video visit will be conducted via a call between you and your provider. We have found that certain health care needs can be provided without the need for an in-person physical exam.  This service lets us provide the care you need with a video conversation. If during the course of the call the provider feels a video visit is not appropriate, you will not be charged for this service.\"   The provider has received verbal consent for a Video Visit from the patient or first contact? Yes  Patient  or facility staff would like the video invitation sent by: N/A   Video Start Time: 1142     [facility] Medical Record Number:  [id number]  Place of Location at the time of visit: [facility]  Chief Complaint   Patient presents with     Nursing Home Acute     Lab:BMP     HPI:  Dustin Pearce  is a 89 year old (1/31/1928), who is being seen today for a visit.  HPI information obtained from: facility chart records, facility staff and [facility] chart review. Today's concern is: per staff pt tired this am--not wanting to do much--this has happened off and on. He fell on 7/16--no apparent injury. He was walking self to bathroom.     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  MEDICATIONS:     Current Outpatient Medications   Medication Sig Dispense Refill     acetaminophen (TYLENOL) 500 MG tablet Take 1,000 mg by mouth 2 times daily And 1000mg daily prn also.     for pain       aspirin (ASA) 81 MG EC tablet Take 1 tablet (81 mg) by mouth daily 30 tablet 0     atorvastatin (LIPITOR) 20 MG tablet Take 1 tablet (20 mg) by mouth At " Bedtime       Blood Glucose Monitoring Suppl FROYLAN 4 times daily (before meals and nightly)       DULoxetine (CYMBALTA) 30 MG capsule TAKE 1 CAPSULE(30 MG) BY MOUTH DAILY 30 capsule 0     ipratropium (ATROVENT) 0.03 % nasal spray INHALE 1 SPRAY IN EACH NOSTRIL EVERY 12 HOURS AS NEEDED 30 mL 11     Lacosamide (VIMPAT) 100 MG TABS tablet Take 1 tablet (100 mg) by mouth 2 times daily . Future refills by PCP Dr. Matt Morrissey with phone number [phone]. 60 tablet 0     levETIRAcetam (KEPPRA) 750 MG tablet Take 1 tablet (750 mg) by mouth 2 times daily 60 tablet 0     magnesium oxide (MAG-OX) 400 MG tablet Take 400 mg by mouth 4 times daily (with meals and nightly)       metFORMIN (GLUCOPHAGE) 1000 MG tablet Take 1 tablet (1,000 mg) by mouth 2 times daily (with meals) (Patient taking differently: Take 1,000 mg by mouth 2 times daily (with meals) Dx: supplement) 60 tablet 0     nitroGLYcerin (NITROSTAT) 0.4 MG sublingual tablet For chest pain place 1 tablet under the tongue every 5 minutes for 3 doses. If symptoms persist 5 minutes after 1st dose call 911. 25 tablet 0     Nutritional Supplements (NUTRITIONAL SUPPLEMENT PO) Diabetic HNS 8 oz BID btwn meals       omeprazole (PRILOSEC) 40 MG DR capsule TAKE 1 CAPSULE(40 MG) BY MOUTH DAILY 30 TO 60 MINUTES BEFORE A MEAL 30 capsule 0     polyethylene glycol-propylene glycol (SYSTANE ULTRA) 0.4-0.3 % SOLN ophthalmic solution Place 1 drop into both eyes daily as needed for dry eyes       polyethylene glycol-propylene glycol (SYSTANE ULTRA) 0.4-0.3 % SOLN ophthalmic solution Place 2 drops into both eyes every evening Dx: Dry Eyes       sennosides (SENOKOT) 8.6 MG tablet Take 2 tablets by mouth daily Hold for loose stools       ticagrelor (BRILINTA) 90 MG tablet Take 1 tablet (90 mg) by mouth 2 times daily 60 tablet 0      REVIEW OF SYSTEMS: limited secondary to pt wanting to sleep.    NO CP, SOB, Cough , HA or pain.. NO nausea    Objective: /66   Pulse 90   Temp 97.6   F (36.4  C)   Resp 18   SpO2 95%   Limited visit exam done given COVID-19 precautions.   EXAM:  GENERAL APPEARANCE:  sleepy,in no distress, oriented to person and place, cooperative  ENT:  Mouth with moist mucous membranes, normal hearing  RESP:  LUNGS per nurse exam: CTA  CV & HEART sounds per nurse exam: RRR, no edema  OTHER Nurse exam: + BTS  M/S:   FERNANDEZ  Randomly, lying in bed, up with assist..   : Luis draining clear yellow urine per nurse--not seen by me today.  SKIN:  Inspection of skin is at baseline --bruising nearly gone on face.  NEURO:   Cranial nerves 2-12 are grossly intact and at patient's baseline  PSYCH:  oriented X person and place,  memory impaired ,       Labs:    Recent Labs   Lab Test 7/17/20 07/05/20  0845 07/04/20  0403    134 132*   POTASSIUM 4.0 4.1 4.6   CHLORIDE 100 101 97   CO2 27 29 27   ANIONGAP 7 4 8   GLC  112* 155*   BUN 12 12 22   CR 0.53/GFR>60 0.64* 0.72   ALLISON 8.8 8.4* 9.1     CBC RESULTS:   Recent Labs   Lab Test 07/04/20  0403   WBC 10.7   RBC 4.15*   HGB 11.8*   HCT 37.6*   MCV 91   MCH 28.4   MCHC 31.4*   RDW 15.7*        ASSESSMENT / PLAN:  (W19.XXXA) Fall, initial encounter  (primary encounter diagnosis)  Comment/Plan: no injury, impulsive, fall risk, monitor.    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 Sugars running mostly mid 100's, no changes to POC, monitor.    (I10) Benign essential hypertension  Comment/Plan: running mostly 120-130's SBP, no changes, monitor.    (E87.1) Hyponatremia  Comment/Plan:  Na fine, BMP is WNL, check prn    (G40.909) Seizure disorder (H)  Comment/Plan: quiescent, cont same POC, monitor.    (R53.81) Physical deconditioning  Comment/Plan: PT OT      Electronically signed by:  ASPEN Montes CNP       Video-Visit Details  Type of service:  Video Visit  Video End Time (time video stopped): 1147  Distant Location (provider location):  [facility]

## 2020-07-21 NOTE — PROGRESS NOTES
[facility]    Chief Complaint   Patient presents with     group home Acute       [place] Medical Record Number:  [id number]  Place of Service where encounter took place:  [facility] ([facility]) [[id number]]    HPI:    Dustin Pearce is a 89 year old  (1/31/1928), who is being seen today for an episodic care visit.  HPI information obtained from: facility chart records, facility staff, patient report and [facility] chart review.   Per staff pt tired, not progressing in therapies.  No acute complaints.       TODAY DURING EXAM HPI and ROS:  Pt sleepy but responds.  Thinks is in hospital.  Says no pain in left shoulder. No CP, SOB, Cough,  HA, N/V, has ruiz, no Bowel Abnormalities.         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    Current Outpatient Medications   Medication Sig Dispense Refill     acetaminophen (TYLENOL) 500 MG tablet Take 1,000 mg by mouth 2 times daily And 1000mg daily prn also.     for pain       aspirin (ASA) 81 MG EC tablet Take 1 tablet (81 mg) by mouth daily 30 tablet 0     atorvastatin (LIPITOR) 20 MG tablet Take 1 tablet (20 mg) by mouth At Bedtime       Blood Glucose Monitoring Suppl FROYLAN 4 times daily (before meals and nightly)       DULoxetine (CYMBALTA) 30 MG capsule TAKE 1 CAPSULE(30 MG) BY MOUTH DAILY 30 capsule 0     ipratropium (ATROVENT) 0.03 % nasal spray INHALE 1 SPRAY IN EACH NOSTRIL EVERY 12 HOURS AS NEEDED 30 mL 11     Lacosamide (VIMPAT) 100 MG TABS tablet Take 1 tablet (100 mg) by mouth 2 times daily . Future  "refills by PCP Dr. Matt Morrissey with phone number [phone]. 60 tablet 0     levETIRAcetam (KEPPRA) 750 MG tablet Take 1 tablet (750 mg) by mouth 2 times daily 60 tablet 0     magnesium oxide (MAG-OX) 400 MG tablet Take 400 mg by mouth 4 times daily (with meals and nightly)       metFORMIN (GLUCOPHAGE) 1000 MG tablet Take 1 tablet (1,000 mg) by mouth 2 times daily (with meals) (Patient taking differently: Take 1,000 mg by mouth 2 times daily (with meals) Dx: supplement) 60 tablet 0     nitroGLYcerin (NITROSTAT) 0.4 MG sublingual tablet For chest pain place 1 tablet under the tongue every 5 minutes for 3 doses. If symptoms persist 5 minutes after 1st dose call 911. 25 tablet 0     Nutritional Supplements (NUTRITIONAL SUPPLEMENT PO) Diabetic HNS 8 oz BID btwn meals       omeprazole (PRILOSEC) 40 MG DR capsule TAKE 1 CAPSULE(40 MG) BY MOUTH DAILY 30 TO 60 MINUTES BEFORE A MEAL 30 capsule 0     polyethylene glycol-propylene glycol (SYSTANE ULTRA) 0.4-0.3 % SOLN ophthalmic solution Place 1 drop into both eyes daily as needed for dry eyes       polyethylene glycol-propylene glycol (SYSTANE ULTRA) 0.4-0.3 % SOLN ophthalmic solution Place 2 drops into both eyes every evening Dx: Dry Eyes       sennosides (SENOKOT) 8.6 MG tablet Take 2 tablets by mouth daily Hold for loose stools       ticagrelor (BRILINTA) 90 MG tablet Take 1 tablet (90 mg) by mouth 2 times daily 60 tablet 0     Medications reviewed:  Medications reconciled to facility chart and changes were made to reflect current medications as identified as above med list. Below are the changes that were made:   Medications stopped since last EPIC medication reconciliation:   There are no discontinued medications.    Medications started since last [facility] medication reconciliation:  No orders of the defined types were placed in this encounter.    Physical Exam:  /73   Pulse 66   Temp 98.1  F (36.7  C)   Resp 16   Ht 1.702 m (5' 7\")   Wt 55.7 kg (122 lb 12.8 " oz)   SpO2 100%   BMI 19.23 kg/m       GENERAL APPEARANCE:  Alert,in no distress, oriented to situation, cooperative  ENT:  Mouth with moist mucous membranes, normal hearing  RESP:  respiratory effort of chest normal, lungs clear to ausculation but mildly decreased  CV:  Ascultation of heart done , RRR, no m,g,r, no edema, +1 pedal pulses bilat.  ABDOMEN:  normal bowel sounds, soft, nontender.  M/S:   FERNANDEZ equally and up with assist.. sitting in bed.  : Luis draining clear yellow urine.  SKIN:  Inspection of skin is at baseline --bruising nearly gone on face.  NEURO:   Cranial nerves 2-12 are grossly intact and at patient's baseline  PSYCH:  oriented X 3, memory impaired , affect and mood normal         CBC RESULTS:   Recent Labs   Lab Test 07/05/20  0845 07/04/20  0403    132*   POTASSIUM 4.1 4.6   CHLORIDE 101 97   CO2 29 27   ANIONGAP 4 8   * 155*   BUN 12 22   CR 0.64* 0.72   ALLISON 8.4* 9.1     CBC RESULTS:   Recent Labs   Lab Test 07/04/20  0403   WBC 10.7   RBC 4.15*   HGB 11.8*   HCT 37.6*   MCV 91   MCH 28.4   MCHC 31.4*   RDW 15.7*            Lab Results   Component Value Date    A1C 6.3 06/01/2020    A1C 8.3 03/05/2020        ASSESSMENT / PLAN:  (S42.92XD) Traumatic closed displaced fracture of left shoulder with anterior dislocation w/ routine healing, sequelae encounter: 6/11/20  (primary encounter diagnosis)   (W19.XXXS) Fall, sequela   (R53.81) Physical deconditioning  Comment/Plan: pt not progressing, Rounds are today. I d/w Therapies: will ask them to set up a family conf.    (Z86.73) History of CVA (cerebrovascular accident)  (R41.89) Cognitive impairment  Comment/Plan: see above, cont same POC   monitor.    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    (I10) Benign essential  "hypertension  Comment/Plan: 120-130's SBP, cont same POC< monitor.     ** I called and updated S.0., Halina who is also POA--- updated her and mentioned I asked the therapy and SW to arrange a care conf with her and family as it is looking less likely he can go home without 24 hr care.  She told me she can not do it and \"her son wants him to go home but does cont realize I cant do it\".  I told her I will check with staff tomorrow when there re: update and ?care conf**     Electronically signed by  ASPEN Montes CNP                  "

## 2020-07-28 NOTE — PROGRESS NOTES
[facility]    Chief Complaint   Patient presents with     [facility] Acute       [place] Medical Record Number:  [id number]  Place of Service where encounter took place:  [facility] ([facility]) [[id number]]    HPI:    Dustin Pearce is a 89 year old  (1/31/1928), who is being seen today for an episodic care visit.  HPI information obtained from: facility chart records, facility staff, patient report and [facility] chart review.   More alert--fell yest, no apparent injury, well alert today. Per staff is stronger but still varies at times. Per SW, Care Conf this Friday re-discharge with lots of services and/or palliative care.       TODAY DURING EXAM HPI and ROS: pt ate all of BF--eating better past few days.  Says no pain in left shoulder. No CP, SOB, Cough,  HA, N/V, has ruiz, no Bowel Abnormalities.         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    Current Outpatient Medications   Medication Sig Dispense Refill     sennosides (SENOKOT) 8.6 MG tablet Take 1 tablet by mouth 2 times daily as needed for constipation       acetaminophen (TYLENOL) 500 MG tablet Take 1,000 mg by mouth 2 times daily And 1000mg daily prn also.     for pain       aspirin (ASA) 81 MG EC tablet Take 1 tablet (81 mg) by mouth daily 30 tablet 0     atorvastatin (LIPITOR) 20 MG tablet Take 1 tablet (20 mg) by mouth At Bedtime       Blood Glucose Monitoring Suppl FROYLAN 4 times daily (before meals and nightly)       DULoxetine (CYMBALTA) 30 MG capsule TAKE 1 CAPSULE(30 MG) BY MOUTH  DAILY 30 capsule 0     ipratropium (ATROVENT) 0.03 % nasal spray INHALE 1 SPRAY IN EACH NOSTRIL EVERY 12 HOURS AS NEEDED 30 mL 11     Lacosamide (VIMPAT) 100 MG TABS tablet Take 1 tablet (100 mg) by mouth 2 times daily . Future refills by PCP Dr. Matt Morrissey with phone number [phone]. 60 tablet 0     levETIRAcetam (KEPPRA) 750 MG tablet Take 1 tablet (750 mg) by mouth 2 times daily 60 tablet 0     magnesium oxide (MAG-OX) 400 MG tablet Take 400 mg by mouth 4 times daily (with meals and nightly)       metFORMIN (GLUCOPHAGE) 1000 MG tablet Take 1 tablet (1,000 mg) by mouth 2 times daily (with meals) (Patient taking differently: Take 1,000 mg by mouth 2 times daily (with meals) Dx: supplement) 60 tablet 0     nitroGLYcerin (NITROSTAT) 0.4 MG sublingual tablet For chest pain place 1 tablet under the tongue every 5 minutes for 3 doses. If symptoms persist 5 minutes after 1st dose call 911. 25 tablet 0     Nutritional Supplements (NUTRITIONAL SUPPLEMENT PO) Diabetic HNS 8 oz BID btwn meals       omeprazole (PRILOSEC) 40 MG DR capsule TAKE 1 CAPSULE(40 MG) BY MOUTH DAILY 30 TO 60 MINUTES BEFORE A MEAL 30 capsule 0     polyethylene glycol-propylene glycol (SYSTANE ULTRA) 0.4-0.3 % SOLN ophthalmic solution Place 1 drop into both eyes daily as needed for dry eyes       polyethylene glycol-propylene glycol (SYSTANE ULTRA) 0.4-0.3 % SOLN ophthalmic solution Place 2 drops into both eyes every evening Dx: Dry Eyes       ticagrelor (BRILINTA) 90 MG tablet Take 1 tablet (90 mg) by mouth 2 times daily 60 tablet 0     Medications reviewed:  Medications reconciled to facility chart and changes were made to reflect current medications as identified as above med list. Below are the changes that were made:   Medications stopped since last EPIC medication reconciliation:   There are no discontinued medications.    Medications started since last [facility] medication reconciliation:  No orders of the defined types were placed in this  "encounter.    Physical Exam:  /56   Pulse 96   Temp 97.6  F (36.4  C)   Resp 16   Ht 1.702 m (5' 7\")   Wt 56.3 kg (124 lb 1.6 oz)   SpO2 97%   BMI 19.44 kg/m       GENERAL APPEARANCE:  Alert,in no distress, oriented to situation, cooperative  ENT:  Mouth with moist mucous membranes, normal hearing  RESP:  respiratory effort of chest normal, lungs clear to ausculation.  CV:  Ascultation of heart done , RRR, no m,g,r, no edema, +1 pedal pulses bilat.  ABDOMEN:  normal bowel sounds, soft, nontender.  M/S:   FERNANDEZ equally and up with assist.. lying in bed  : Luis draining clear yellow urine.  SKIN:  Inspection of skin is at baseline   NEURO:   Cranial nerves 2-12 are grossly intact and at patient's baseline  PSYCH:  oriented X 3, memory impaired , affect and mood normal         CBC RESULTS:   Recent Labs   Lab Test 07/17/20 07/05/20  0845   * 134   POTASSIUM 4.0 4.1   CHLORIDE 100 101   CO2 27 29   ANIONGAP 7 4   * 112*   BUN 12 12   CR 0.53* 0.64*   ALLISON 8.8 8.4*      CBC RESULTS:   Recent Labs   Lab Test 07/04/20  0403   WBC 10.7   RBC 4.15*   HGB 11.8*   HCT 37.6*   MCV 91   MCH 28.4   MCHC 31.4*   RDW 15.7*                ASSESSMENT / PLAN:  (S42.92XD) Traumatic closed displaced fracture of left shoulder with anterior dislocation w/ routine healing, sequelae encounter: 6/11/20  (primary encounter diagnosis)   (W19.XXXS) Fall, sequela   (R53.81) Physical deconditioning  Comment/Plan: pt is progressing better but still varies,  Soon to exhaust Medicare Benefit. Family conf on  Friday for discharge planning    (Z86.73) History of CVA (cerebrovascular accident)  (R41.89) Cognitive impairment  Comment/Plan: see above, cont same POC   monitor.    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    (I10) Benign essential " hypertension  Comment/Plan: 105-130's SBP, cont same POC< monitor.      Electronically signed by  ASPEN Montes CNP

## 2020-07-29 NOTE — TELEPHONE ENCOUNTER
No longer taking Lisinopril per progress notes 6/16/20.  Lisinopril is not on medication list.    Pharmacy notified electronically.  Beulah Blake RN on 7/29/2020 at 1:44 PM

## 2020-08-04 PROBLEM — S32.9XXA PELVIC FRACTURE (H): Status: ACTIVE | Noted: 2020-01-01

## 2020-08-04 NOTE — ED PROVIDER NOTES
History     Chief Complaint: Fall       History limited by: patient's dementia.      Dustin Pearce is a 89 year old male with a history of dementia who presents after a fall. The patient had an unwitnessed fall at 1250 today, and denies LOC. It is unknown whether he hit his head, and he states that he does not know why he fell. He otherwise denies chest pain, pressure, or shortness of breath, as well as neck pain, nausea, rhinorrhea, sore throat, or cough. The patient states that he is currently not in any pain.    Allergies:  Oxycodone  Sulfa Drugs  Tetracycline     Medications:    Baby Aspirin  Lipitor  Cymbalta  Lacosamide  Keppra  Metformin  Nitroglycerin  Omeprazole  Senokot  Brillinta    Past Medical History:    Abdominal Aortic Aneurism  Chronic Systolic Contestive Heart Failure  Aortic Stenosis  Hypertension  Benign Prostatic Hyperplasia  CAD  Carotid Artery Stenosis  Cognitive impairment  Depression  Diabetes type II  GERD  Heart attack  Hyperlipidemia  Ischemic cardiomyopathy  Mild cognitive impairment  Nephrolithiasis  NSTEMI  Osteopenia  PAD  Paroxysmal atrial fibrillation  RBBB with left anterior fascicular block  Stroke of unknown etiology  TIA  Ventricular tachycardia    Past Surgical History:    Partial left foot amputation  Cholecystectomy  Endarterectomy carotid, right  Kidney stone removal x 4  Hernia repair  I&D foot  Rotator cuff repair right/left     Family History:    Cancer  Heart failure     Social History:  Smoking Status: Former Smoker  Smokeless Tobacco: Never Used  Alcohol Use: Not Currently  Drug Use: No  PCP: Matt Morrissey     Review of Systems   HENT: Negative for rhinorrhea and sore throat.    Respiratory: Negative for cough and shortness of breath.    Cardiovascular: Negative for chest pain.   Gastrointestinal: Negative for nausea.   Musculoskeletal: Negative for back pain and neck pain.   All other systems reviewed and are negative.      Physical Exam     Patient Vitals for  the past 24 hrs:   BP Temp Temp src Pulse Heart Rate Resp SpO2 Weight   08/04/20 2328 124/52 98.3  F (36.8  C) Oral -- 93 16 95 % 56.1 kg (123 lb 11.2 oz)   08/04/20 2230 106/52 -- -- 97 -- -- 95 % --   08/04/20 2100 109/54 -- -- 98 -- -- 96 % --   08/04/20 2000 121/78 -- -- 109 -- -- 97 % --   08/04/20 1900 95/62 -- -- 103 -- -- 96 % --   08/04/20 1815 112/62 -- -- 104 -- -- 98 % --   08/04/20 1804 (!) 134/96 97.5  F (36.4  C) Oral 104 -- 16 98 % --       Physical Exam  Constitutional: Alert, attentive, pleasantly confused  HENT:    Nose: Nose normal.    Mouth/Throat: Oropharynx is clear, mucous membranes are moist  Eyes: EOM are normal, anicteric, conjugate gaze  CV: regular rate and rhythm; no murmurs  Chest: Effort normal and breath sounds clear without wheezing or rales, symmetric bilaterally   GI:  non tender. No distension. No guarding or rebound.    MSK: No tenderness to palpation of upper and lower extremities.  Neck: No midline tenderness, ROM full  Back: no midline tenderness.  : ruiz in place draining dark bárbara urine, no clots or gross blood.   Neurological: Alert, attentive, moving all extremities equally.   Skin: Skin is warm and dry.    Emergency Department Course   ECG (18:46:28):  Rate 103 bpm. NM interval 178. QRS duration 134. QT/QTc 378/495. P-R-T axes 82 269 76. Sinus tachyardia with frequent premature ventricular complexes. Right superior axis deviation. Nonspecific intraventricular block. No significant change when compared to EKG dated 07/04/2020. Interpreted at 1846 by Wilmar Gustafson MD.    Imaging:  Radiology findings were communicated with the patient who voiced understanding of the findings.    XR Chest 2 Views  IMPRESSION: AP and lateral views of the chest were obtained. Cardiomediastinal silhouette is within normal limits. Atherosclerotic vascular calcification of the thoracic aorta and its major branches. No suspicious focal pulmonary opacities. No significant pleural  effusion or pneumothorax. Minimal displaced fracture of the anterolateral aspect of the left sixth rib. Diffuse osteopenia with multilevel degenerative changes of the spine.  As read by Radiology.    Head CT w/o contrast  IMPRESSION: No intracranial bleed or skull fractures.  As read by Radiology.     CT Abdomen Pelvis w/o Contrast  Pending at time of admission to assess for possible obstructing ureteral stone given his UTI.  As read by Radiology.     Laboratory:  Laboratory findings were communicated with the patient who voiced understanding of the findings.    CBC: WBC 16.3 (H), HGB 11.9 (L), WNL ()   BMP: Glucose 176 (H), o/w WNL (Creatinine 0.89)    UA: Ketones 10 (A), Blood moderate (A), albumin 100 (A), leukocyte esterase large (A), WBC 84 (H), RBC >182 (H), Squamous epithelial 4 (H), mucous present (A), o/w Negative  Urine culture: pending    Interventions:   NS 1 L IV Bolus     Rocephin 2 g IV    Emergency Department Course:  The patient arrived in the emergency department via EMS.   Past medical records, nursing notes, and vitals reviewed.    : I performed an exam of the patient and obtained history, as documented above.    The patient was sent for a CT scan while in the emergency department, findings above.    IV inserted and blood drawn. This was sent to laboratory for testing, findings above.     : Findings and plan explained to the Patient who consents to admission. Discussed the patient with Dr. Segura, who will admit the patient to a observation bed for further monitoring, evaluation, and treatment.    I personally reviewed the laboratory and imaging results with the Patient and answered all related questions prior to admission.     Impression & Plan     Medical Decision Makin-year-old male with past medical history of confer cognitive impairment, question dementia, diabetes, hypertension, abdominal aortic aneurysm, peripheral artery disease, paroxysmal A. fib, carotid  artery stenosis, history of seizure disorder presenting for evaluation of an unwitnessed fall.  At time of my exam, patient has no complaints including any head, neck chest or abdominal pain.  Given his cognitive impairment, more thorough evaluation was obtained, CT imaging of his head was negative.  His labs did note moderate leukocytosis of 16 with a left shift however he is no evidence of fever here.  He does have a chronic indwelling Luis due to urinary retention and after Luis exchange, UA is suggestive of UTI.  Urine culture sent, he was given a dose of ceftriaxone.  He was also noted to have relative elevation in his creatinine from 0.6 to 0.9.  He did require 500 cc IV fluids to obtain a urine sample consistent with likely dehydration.  His EKG is unchanged, I have lower suspicion for syncope though he was admitted for the same 3 weeks prior.  We will plan to admit to medicine for continued IV antibiotics, gentle IV hydration given his history of heart failure.  In discussion with Dr. Segura, hospitalist, plan was for CT imaging of his abdomen and pelvis to assess for possible obstructing stone given his history of nephrolithiasis.  Patient was admitted pending CT results.    Diagnosis:    ICD-10-CM    1. Bandemia  D72.825 UA with Microscopic     Urine Culture Aerobic Bacterial     Asymptomatic COVID-19 Virus (Coronavirus) by PCR   2. GALO (acute kidney injury) (H)  N17.9    3. Fall, initial encounter  W19.XXXA        Disposition: Admitted to Dr. Segura.    Wilmar Gustafson MD  [employer]  1:41 AM 08/05/20     Scribe Disclosure:  IMelissa, am serving as a scribe at 6:00 PM on 8/4/2020 to document services personally performed by Wilmar Gustafson MD based on my observations and the provider's statements to me.      Melissa Christianson  08/04/20  [facility]     Wilmar Gustafson MD  08/05/20 0141

## 2020-08-04 NOTE — ED NOTES
Bed: ED28  Expected date: 8/4/20  Expected time: 5:49 PM  Means of arrival: Ambulance  Comments:  519 89m fall, head injury [id number]

## 2020-08-04 NOTE — ED TRIAGE NOTES
Pt had an unwitnessed fall at 1250 today, unknown LOC or if he hit his head. Report from staff 1 hour post fall pupil size change on R and then normalized. 1 hour PTA had 2 episodes of emesis prompting them to call EMS. Vitally stable on transport. No thinners, baseline dementia A&Ox2.

## 2020-08-04 NOTE — TELEPHONE ENCOUNTER
Dustin had a fall at 1250pm unwitnessed and possible strike to head.    At the 1:20pm neuro checks possible abnormal pupils.  Right smaller than left but do not see that now.    At 5:05pm had a large emesis and a small one about an hour before that.  P = 117 and baseline is in 60's.  T = 98.5  Full Code    Previous order was to hold ASA for 5 days when fall initially reported.    Orders:  Send to [facility] ED for evaluation from fall that was unwitnessed and emesis with tachycardia.    Electronically signed by Millicent Woodruff RN, CNP

## 2020-08-05 NOTE — H&P
[facility]    History and Physical - Hospitalist Service       Date of Admission:  8/4/2020    Assessment & Plan   Dustin Pearce is a 89 year old male admitted on 8/4/2020. He presents after an unwitnessed fall at his care facility, concerned that he may have hit his head in the setting of mental slowing and anisocoria.    Catheter related urinary tract infection: Abnormal urinalysis with hematuria, pyuria.  Patient with decreased mental acuity/mental slowing in the setting of dementia noted by myself as well as patient's significant other on visit earlier today.  Catheter was exchanged in the emergency department prior to specimen being obtained  -Continue ceftriaxone 1 g every 24 hours  -urine culture pending  -Given history of renal stones, hematuria, left CVA tenderness with percussion, creatinine elevation from baseline, obtaining noncontrast CT of abdomen and pelvis to rule out obstructive uropathy.  Dr. Gustafson ordered this in the emergency department following discussion.  No hydronephrosis or perinephric stranding noted.    Possible infectious encephalopathy in the setting of urinary tract infection: Difficult to determine degree of contribution given known underlying dementia.  -Fall precautions  -Keppra lacosamide levels pending from the emergency department.    Frequent falls: Essentially monthly hospitalizations for falls with various orthopedic injuries including left shoulder injury in June, July.  Significant prior work-up including ZIO patch monitoring without significant arrhythmias, recent TTE with mild to moderate aortic stenosis, multiple prior EEGs without seizure activity  Occult pelvic fractures: Incidentally noted on CT abdomen pelvis.  Acute mildly displaced fracture of right pubic bone with suspected surrounding blood products, acute nondisplaced vertical right iliac wing fracture.  Patient with no complaints of pain, though history limited by dementia.  -Orthopedic  surgery consulted for trauma evaluation  -Trending hemoglobin as below  -Bedrest until cleared by surgery; when cleared from bed rest will require PT/OT evaluations  -Fall precautions  -Compression stockings to bilateral lower extremities given prior concern for orthostasis as contributing factor to falls    Anemia: Chronic and stable.  Patient presents with a hemoglobin of 11.9.  In the setting of pelvic fractures with possible intrapelvic blood products, trending hemoglobin  -Repeat hemoglobin every 6 hours x3  -Aspirin and Brilinta currently on hold given risk of pelvic bleed associated with occult fractures.  If hemoglobin remains stable, could potentially resume these medications this afternoon after orthopedic surgery evaluation.  If worsening hemoglobin and concern for intra-abdominal bleed, may require interventional radiology embolization.  Low suspicion for this currently, though would monitor closely given the patient's dementia and potential inability to make needs known    Dysphasia:  -N.p.o. until cleared by speech pathology    History of seizure disorder: Follows with neurology, multiple fairly recent seizure evaluations without capture on EEG  -Continue Keppra 750 mg twice daily; converted to IV given n.p.o. status  -Continue prior to admission lacosamide when able to tolerate oral medications  -Keppra and lacosamide levels pending from ER    Peripheral arterial disease:  Coronary artery disease with associated ischemic cardiomyopathy: Most recent ejection fraction April 2020 with 30 to 35%, moderate aortic stenosis  History of CVA:  Carotid artery stenosis: Status post right carotid enterectomy  Nonsustained ventricular tachycardia: Max 17 beat run on recent ZIO patch monitoring.  No symptoms at that time, though cannot rule out more persistent V. tach episodes resulting in falls.  Would not necessarily explain encephalopathy currently noted.  Paroxysmal atrial fibrillation: Possible.  Not noted on  recent ZIO patch monitoring  -Prior to admission aspirin and Brilinta on hold given intra-abdominal pelvic bleed associated with occult fractures  -Continue prior to admission atorvastatin  -Remains off of beta-blockade following recent cardiology ZIO patch monitoring and concern for orthostatic falls.    Type 2 diabetes: Last hemoglobin A1c 6.3 on 6/1/2020.  -Medium dose sliding scale insulin every 4 hours while n.p.o.       Diet: N.p.o. in the setting of dysphasia until speech pathology clearance  DVT Prophylaxis: Pneumatic compression devices  Luis Catheter: in place, indication:  Chronic urinary retention  Code Status: Full code         Disposition Plan   Expected discharge: 2 - 3 days, recommended to prior living arrangement once Orthopedic plan established, hemoglobin stable, antibiotic plan established..  Entered: Germain Segura MD 08/04/2020, 9:49 PM     The patient's care was discussed with the Patient, Dr Gustafson in the emergency department, Patient's significant other, Halina Dhillon.    Germain Segura MD  [facility]    ______________________________________________________________________    Chief Complaint   Fall at care facility    History obtained from chart review, discussion with patient significant other, Halina, over the telephone, discussion with Dr. Gustafson in the emergency department.  Patient is unable to provide any meaningful history in the setting of dementia and possible infectious encephalopathy.  When answering questions, he cannot recall a fall, initially tells me that he did not have an episode of vomiting, and when I tell him that he was reported to have an episode of vomiting he tells me that he did have an episode of vomiting.  He cannot provide any details surrounding the events of his presentation.    History of Present Illness   Dustin Pearce is a 89 year old male who had a fall at ~1 pm at his long-term care facility, [facility].  Patient fell,  apparently hit his head, though was subsequently ambulating.  Patient significant other, Halina, visited with patient later in the afternoon, and he was noted to have some mental slowing as compared to his baseline, pulling at his mask and needing to be redirected where this typically would not be the case per her report.  With this, patient was transported the emergency department for evaluation.  In the ER, patient had his chronic indwelling Luis catheter exchanged and a urinalysis was obtained demonstrating pyuria, hematuria.  His creatinine was reported as mildly elevated from baseline at 0.89.  On review of prior values, his baseline actually appears to be close to 0.75 range, though did have 2 more recent values, 1 of which was at the end of an observation hospitalization, in the 0.6 range.    Halina tells me that patient has not had any recent complaints or changes in his status.  Recently hospitalized for falls with left shoulder dislocation, though has multiple recent falls, and was actually admitted by myself 6/11/2020.  Several recent ZIO patch monitoring series with patient discontinued from beta-blockade after no atrial fibrillation noted and concern with falls associated with orthostasis.  Repeat hospitalization with possible TIA and fall resulting in repeat ZIO patch monitoring with some nonsustained ventricular tachycardia which was asymptomatic.  Following fall at approximately 1 PM what was thought that patient might have hit his head, Halina actually met with him around the 3-4:00 hour for a weekly 20-minute visit.  She has been talking with him on the phone approximately 4 times per day prior to this, though when he came to his visit, he had clear mental slowing.  Was late for his visit, and Halina assumed that staff had to wake him up.    After cath the left, patient apparently had 2 episodes of emesis.  An exam was performed and it was noted that patient had anisocoria, with left pupil slightly  "larger than right.  In the setting of nausea with vomiting, anisocoria, mental slowing, and recent fall, patient was brought to the emergency department for evaluation.     Head CT without acute change, though urinalysis abnormal as above.  No fevers.    He reports no pain, no complaints.  Does have some CVA tenderness on percussion during my exam, though does not believe he has a kidney stone based on his current versus past experiences.  Urine with microscopic hematuria, though is not grossly bloody.    Review of Systems    Review of systems not obtained due to patient factors - confusion and dementia.  Patient does not recall a fall, does not recall episodes of vomiting until he is reminded as such.  With leading questioning, he can provide no details of events.  He tells me he feels like \"Horse shit,\" but is unable to tell me why    Past Medical History    I have reviewed this patient's medical history and updated it with pertinent information if needed.   Past Medical History:   Diagnosis Date     Abdominal aortic aneurysm (H) 12/25/2016     Acute on chronic systolic congestive heart failure (H) 6/7/2018     Anemia 6/7/2018     Anemia due to blood loss, acute 6/13/2017     Aortic stenosis     mild     Benign essential hypertension 1/6/2017     Benign non-nodular prostatic hyperplasia with lower urinary tract symptoms 10/20/2016     CAD (coronary artery disease)     MI 1970     Carotid artery stenosis 10/20/2016    chronically occluded left internal carotid artery     Cerebrovascular accident (H) 10/20/2016     Cognitive impairment 6/7/2018     Coronary artery disease of native artery of native heart with stable angina pectoris (H) 10/20/2016    MI 1970     Depression, unspecified depression type 2/22/2018     Diabetes mellitus (H)      Diabetic ulcer of left foot associated with diabetes mellitus due to underlying condition (H) 6/12/2017     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     " Gastro-oesophageal reflux disease      Gastroesophageal reflux disease without esophagitis 10/20/2016     Heart attack (H)      Hyperlipidemia      Hyperlipidemia, unspecified hyperlipidemia type 10/20/2016     Ischemic cardiomyopathy      Lumbar back pain 12/30/2016     Mild cognitive impairment 10/20/2016     Nephrolithiasis     RECURRENT     NSTEMI (non-ST elevated myocardial infarction) (H) 6/7/2018     Osteopenia      PAD (peripheral artery disease) (H)     peripheral angiogram 5/2017: The common iliac artery stenoses were stented and the superficial femoral artery stenoses were angioplastied     Paroxysmal atrial fibrillation (H)      Peripheral artery disease (H)     peripheral angiogram 5/2017: The common iliac artery stenoses were stented and the superficial femoral artery stenoses were angioplastied     RBBB with left anterior fascicular block      Slow transit constipation 2/22/2018     Stroke of unknown etiology (H) 12/31/2017     Syncope      TIA (transient ischemic attack) 1/20/2017     Unspecified cerebral artery occlusion with cerebral infarction      UTI (urinary tract infection) due to Enterococcus 2/22/2018     Ventricular tachycardia (H)     nonsustained       Past Surgical History   I have reviewed this patient's surgical history and updated it with pertinent information if needed.  Past Surgical History:   Procedure Laterality Date     AMPUTATE FOOT Left 6/4/2017    Procedure: AMPUTATE FOOT;  Sun Add#3: partial left foot amputation - Sagital Saw - Mini C-Arm;  Surgeon: Moe Kirk DPM;  Location:  OR     CHOLECYSTECTOMY       ENDARTERECTOMY CAROTID Right 1/3/2018    Procedure: ENDARTERECTOMY CAROTID;  RIGHT CAROTID ENDARTERECTOMY WITH EEG;  Surgeon: Roland Rodriguez MD;  Location:  OR     ESOPHAGOSCOPY, GASTROSCOPY, DUODENOSCOPY (EGD), COMBINED N/A 12/26/2016    Procedure: COMBINED ESOPHAGOSCOPY, GASTROSCOPY, DUODENOSCOPY (EGD);  Surgeon: Nasim Louis MD;   Location:  GI     GENITOURINARY SURGERY      KIDNEY STONE REMOVAL X 4     HC UGI ENDOSCOPY W EUS N/A 2016    Procedure: COMBINED ENDOSCOPIC ULTRASOUND, ESOPHAGOSCOPY, GASTROSCOPY, DUODENOSCOPY (EGD);  Surgeon: Nasim Louis MD;  Location:  GI     HERNIA REPAIR       INCISION AND DRAINAGE FOOT, COMBINED Left 2017    Procedure: COMBINED INCISION AND DRAINAGE FOOT;  REVISIONAL LEFT FOOT INCISION AND DRAINAGE WITH POSSIBLE FLAP CLOSURE , ANTIBIOTIC SOLUTION ;  Surgeon: Nabil Ann DPM;  Location:  OR     LASER HOLMIUM LITHOTRIPSY URETER(S), INSERT STENT, COMBINED  3/2/2012    Procedure:COMBINED CYSTOSCOPY, URETEROSCOPY, LASER HOLMIUM LITHOTRIPSY URETER(S), INSERT STENT; CYSTOSCOPY, RIGHT RETROGRADES, RIGHT URETEROSCOPY, HOLMIUM LASER, RIGHT STENT PLACEMENT; Surgeon:ANJELICA LEÓN; Location: OR     ROTATOR CUFF REPAIR RT/LT         Social History   I have reviewed this patient's social history and updated it with pertinent information if needed.  Social History     Tobacco Use     Smoking status: Former Smoker     Packs/day: 1.00     Years: 30.00     Pack years: 30.00     Types: Cigarettes     Start date:      Last attempt to quit: 1999     Years since quittin.6     Smokeless tobacco: Never Used   Substance Use Topics     Alcohol use: Not Currently     Alcohol/week: 7.0 standard drinks     Types: 7 Standard drinks or equivalent per week     Comment: 1 drink per biweekly     Drug use: No       Family History   I have reviewed this patient's family history and updated it with pertinent information if needed.  Family History   Problem Relation Age of Onset     Breast Cancer Mother      Heart Failure Father 81       Prior to Admission Medications   Prior to Admission Medications   Prescriptions Last Dose Informant Patient Reported? Taking?   Blood Glucose Monitoring Suppl FROYLAN   Yes No   Si times daily (before meals and nightly)   DULoxetine (CYMBALTA) 30 MG capsule    No No   Sig: TAKE 1 CAPSULE(30 MG) BY MOUTH DAILY   Lacosamide (VIMPAT) 100 MG TABS tablet  Nursing Home No No   Sig: Take 1 tablet (100 mg) by mouth 2 times daily . Future refills by PCP Dr. Mtat Morrissey with phone number [phone].   Nutritional Supplements (NUTRITIONAL SUPPLEMENT PO)   Yes No   Sig: Diabetic HNS 8 oz BID btwn meals   acetaminophen (TYLENOL) 500 MG tablet  Nursing Home Yes No   Sig: Take 1,000 mg by mouth 2 times daily And 1000mg daily prn also.     for pain   aspirin (ASA) 81 MG EC tablet  Nursing Home No No   Sig: Take 1 tablet (81 mg) by mouth daily   atorvastatin (LIPITOR) 20 MG tablet   No No   Sig: Take 1 tablet (20 mg) by mouth At Bedtime   ipratropium (ATROVENT) 0.03 % nasal spray   No No   Sig: INHALE 1 SPRAY IN EACH NOSTRIL EVERY 12 HOURS AS NEEDED   levETIRAcetam (KEPPRA) 750 MG tablet  Nursing Home No No   Sig: Take 1 tablet (750 mg) by mouth 2 times daily   magnesium oxide (MAG-OX) 400 MG tablet  Nursing Home Yes No   Sig: Take 400 mg by mouth 4 times daily (with meals and nightly)   metFORMIN (GLUCOPHAGE) 1000 MG tablet  Nursing Home No No   Sig: Take 1 tablet (1,000 mg) by mouth 2 times daily (with meals)   Patient taking differently: Take 1,000 mg by mouth 2 times daily (with meals) Dx: supplement   nitroGLYcerin (NITROSTAT) 0.4 MG sublingual tablet  Nursing Home No No   Sig: For chest pain place 1 tablet under the tongue every 5 minutes for 3 doses. If symptoms persist 5 minutes after 1st dose call 911.   omeprazole (PRILOSEC) 40 MG DR capsule  Nursing Home No No   Sig: TAKE 1 CAPSULE(40 MG) BY MOUTH DAILY 30 TO 60 MINUTES BEFORE A MEAL   polyethylene glycol-propylene glycol (SYSTANE ULTRA) 0.4-0.3 % SOLN ophthalmic solution  Nursing Home Yes No   Sig: Place 2 drops into both eyes every evening Dx: Dry Eyes   polyethylene glycol-propylene glycol (SYSTANE ULTRA) 0.4-0.3 % SOLN ophthalmic solution  Nursing Home Yes No   Sig: Place 1 drop into both eyes daily as needed for  "dry eyes   sennosides (SENOKOT) 8.6 MG tablet   Yes No   Sig: Take 1 tablet by mouth 2 times daily as needed for constipation   ticagrelor (BRILINTA) 90 MG tablet  Nursing Home No No   Sig: Take 1 tablet (90 mg) by mouth 2 times daily      Facility-Administered Medications: None     Allergies   Allergies   Allergen Reactions     Oxycodone Other (See Comments)     \"TERRIBLE SWEATING\"     Sulfa Drugs      Tetracycline        Physical Exam   Vital Signs: Temp: 97.5  F (36.4  C) Temp src: Oral BP: 109/54 Pulse: 98   Resp: 16 SpO2: 96 % O2 Device: None (Room air)      General Appearance: Frail elderly 89-year-old male in no distress laying on hospitals.  Eyes: No scleral icterus or injection. L pupil ~2mm larger than R  HEENT: No evidence of recent trauma appreciated.  Normocephalic.  Wasting of muscles of mastication is present.  Respiratory: Only fair effort, though breath sounds clear bilaterally to auscultation.  Fair air movement throughout lung fields.  Cardiovascular: Regular rate and rhythm.  Heart rate currently in the 90s.  GI: Abdomen soft, mild suprapubic tenderness to palpation.  No palpable mass.  Lymph/Hematologic: No lower extremity edema  Genitourinary: Patient with left-sided CVA tenderness to percussion.  Right side is spared and patient confirms that discomfort is asymmetric.  Luis catheter in place draining straw yellow urine.  Skin: No rashes, slightly pallorous  Musculoskeletal: Diffuse muscular wasting in all extremities which is fairly notable.  Fairly significant subcutaneous fat loss.  Neurologic: Patient is pleasantly confused.  Psychiatric: Pleasant, overall normal affect    Data   Data reviewed today: I reviewed all medications, new labs and imaging results over the last 24 hours. I personally reviewed the abdominal CT image(s) showing Multiple renal stones, though no obstructive ureter apathy.  Large left-sided renal cyst with calcified septation which has been present in the past.  " Note radiology read identifying pelvic fractures and small amount of suspected blood products in pelvis.    Recent Labs   Lab 08/05/20  0009 08/04/20 1942   WBC  --  16.3*   HGB 10.3* 11.9*   MCV  --  89   PLT  --  302   NA  --  135   POTASSIUM  --  4.3   CHLORIDE  --  102   CO2  --  26   BUN  --  22   CR  --  0.89   ANIONGAP  --  7   ALLISON  --  9.1   GLC  --  176*   ALBUMIN  --  3.4   PROTTOTAL  --  6.7*   BILITOTAL  --  0.4   ALKPHOS  --  81   ALT  --  35   AST  --  21

## 2020-08-05 NOTE — CONSULTS
Care Transition Initial Assessment -      Met with: Patient and Family    Active Problems:    Pelvic fracture (H)       DATA  Lives With: spouse, Halina [phone]  Quality of Family Relationships: supportive, helpful, involved  Description of Support System: Supportive, Involved  Who is your support system?: Wife  Support Assessment: Adequate family and caregiver support, Adequate social supports.  Quality of Family Relationships: supportive, helpful, involved    Identified issues/concerns regarding health management:   Per Care Transitions consult for discharge planning. Patient admitted to [facility]/4[facility] with bandemia. The tentative date of discharge is 8/6/20 or 8/7/20. Reviewed chart and spoke with patient and wife, Halina ([phone]) regarding discharge planning. Patient lives in a town home and has good support. Patient and wife wish for patient to return to [facility] for therapies once ready to discharge from [facility]. Patient has a bed hold at [facility].   [facility] stretcher/wheelchair ride depending on patients needs discussed and agreed upon. Halina recalls patient transported via [facility] stretcher ride at last discharge.     ASSESSMENT  Cognitive Status:  Awake and alert  Concerns to be addressed: Discharge back to [facility].     PLAN  Financial costs for the patient includes [facility] Transport at discharge.  Patient given options and choices for discharge Yes .  Patient/family is agreeable to the plan?  Yes  Transportation/person available to transport on day of discharge  is [employer], likely stretcher due to dementia and have they been notified/set up TBD  Patient Goals and Preferences: Return to [facility].  Patient anticipates discharging to:  [facility] .    ESAU Simon, LGSW   [facility]  [phone]

## 2020-08-05 NOTE — PROGRESS NOTES
[facility]    Hospitalist Progress Note    Date of Admission:  8/4/2020    Assessment & Plan     Dustin Pearce is a 89 year old male admitted on 8/4/2020. He presents after an unwitnessed fall at his care facility, concerned that he may have hit his head in the setting of mental slowing and anisocoria.     Catheter related urinary tract infection: Abnormal urinalysis with hematuria, pyuria.  Patient with decreased mental acuity/mental slowing in the setting of dementia noted by myself as well as patient's significant other on visit earlier today.  Catheter was exchanged in the emergency department prior to specimen being obtained  -Continue ceftriaxone 1 g every 24 hours  -urine culture pending  -Given history of renal stones, hematuria, left CVA tenderness with percussion, creatinine elevation from baseline, obtained noncontrast CT of abdomen and pelvis to rule out obstructive uropathy.  No hydronephrosis or perinephric stranding noted.  -Leukocytosis has improved.     Possible infectious encephalopathy in the setting of urinary tract infection: Difficult to determine degree of contribution given known underlying dementia.  -Fall precautions  -Keppra, lacosamide levels pending from the emergency department.     Frequent falls: Essentially monthly hospitalizations for falls with various orthopedic injuries including left shoulder injury in June, July.  Significant prior work-up including ZIO patch monitoring without significant arrhythmias, recent TTE with mild to moderate aortic stenosis, multiple prior EEGs without seizure activity  Occult pelvic fractures: Incidentally noted on CT abdomen pelvis.  Acute mildly displaced fracture of right pubic bone with suspected surrounding blood products, acute nondisplaced vertical right iliac wing fracture.  Patient with no complaints of pain, though history limited by dementia.  -Orthopedic surgery consulted for trauma evaluation  -They have recommended  nonoperative management with WBAT with walker. Follow-up with Ward at [facility] in 6 weeks for recheck.  Call [phone] for appointment.  -Hemoglobin at 10 [11.9 at admission].  Continue to monitor  -PT/OT  -Fall precautions  -Compression stockings to bilateral lower extremities given prior concern for orthostasis as contributing factor to falls     Anemia: Chronic and stable.  Patient presents with a hemoglobin of 11.9.  In the setting of pelvic fractures with possible intrapelvic blood products, trending hemoglobin  -Hemoglobin trended down to 10.  Continue to monitor.  -Aspirin and Brilinta currently on hold given risk of pelvic bleed associated with occult fractures.   If worsening hemoglobin and concern for intra-abdominal bleed, may require interventional radiology embolization.  Low suspicion for this currently, though would monitor closely given the patient's dementia and potential inability to make needs known       Dysphagia  -Seen by speech pathology.  Has been placed on DD 1 diet with nectar thickened liquids.     History of seizure disorder: Follows with neurology, multiple fairly recent seizure evaluations without capture on EEG  -Continue Keppra 750 mg twice daily  -Continue prior to admission lacosamide   -Keppra and lacosamide levels pending from ER     Peripheral arterial disease:  Coronary artery disease with associated ischemic cardiomyopathy: Most recent ejection fraction April 2020 with 30 to 35%, moderate aortic stenosis  History of CVA:  Carotid artery stenosis: Status post right carotid enterectomy  Nonsustained ventricular tachycardia: Max 17 beat run on recent ZIO patch monitoring.  No symptoms at that time, though cannot rule out more persistent V. tach episodes resulting in falls.  Would not necessarily explain encephalopathy currently noted.  Paroxysmal atrial fibrillation: Possible.  Not noted on recent ZIO patch monitoring  -Prior to admission aspirin and Brilinta on hold given  "intra-abdominal pelvic bleed associated with occult fractures  -Continue prior to admission atorvastatin  -Remains off of beta-blockade following recent cardiology ZIO patch monitoring and concern for orthostatic falls.     Type 2 diabetes: Last hemoglobin A1c 6.3 on 6/1/2020.  -Medium dose sliding scale insulin 4 times daily with meals and at bedtime        Diet:  DD 1 with nectar thickened liquids  DVT Prophylaxis: Pneumatic compression devices  Luis Catheter: in place, indication:  Chronic urinary retention  Code Status: Full code       Goals of care conversation: I discussed recurrent recent hospitalizations, general decline over the last few months and questioned the appropriateness of Dustin continuing to remain full code.  Discussed poor chances of meaningful survival after a CPR event given Dustin's overall frail condition and underlying dementia.  His wife Halina, insisted on Dustin remaining full code at this time and stated that she understood our concerns.  She would like to talk with his son first prior to making any changes.     Disposition Plan     Expected discharge:  1-2 days back to care facility, recommended to prior living arrangement once  hemoglobin stable, antibiotic plan established      Total time spent in patient encounter: 35 minutes  Greater than 50% time spent in family counseling and care coordination [discussing goals of care].    Isaura Jenkins MD  Text Page (7am - 6pm, M-F)    Interval History   Patient has remained stable overnight.  No fevers.  This morning he is laying in bed alert.  Did not know where he was.  When prompted that this was the hospital he nodded in agreement.  He kept repeating \"I stepped wrong\" in response to most of my questions.  Told me his wife's name was \"Poppy\" but then stated that that was not sounding right.  I reminded him that his wife's name is Halina.  He denied pain or shortness of breath.    -Data reviewed today: I reviewed all new labs and imaging " results over the last 24 hours. I personally reviewed CT scan with result as noted above    Physical Exam   Temp: 98.3  F (36.8  C) Temp src: Oral BP: 119/53 Pulse: 97 Heart Rate: 88 Resp: 16 SpO2: 93 % O2 Device: None (Room air)    Vitals:    08/04/20 2328 08/05/20 0200   Weight: 56.1 kg (123 lb 11.2 oz) 56.1 kg (123 lb 11.2 oz)     Vital Signs with Ranges  Temp:  [97.5  F (36.4  C)-98.3  F (36.8  C)] 98.3  F (36.8  C)  Pulse:  [] 97  Heart Rate:  [88-93] 88  Resp:  [16] 16  BP: ()/(52-96) 119/53  SpO2:  [93 %-98 %] 93 %  I/O last 3 completed shifts:  In: -   Out: 150 [Urine:150]    Constitutional: Alert, appears comfortable, in no acute distress, frail elderly male laying in bed  Respiratory: Non labored breathing, clear to auscultation bilaterally  Cardiovascular: Heart sounds regular rate and rhythm, no murmurs, no leg edema  GI: Abdomen is soft, non distended, non tender. Normal BS, Luis catheter noted  Skin/Integumen: no rashes, scabs on both knees  Neuro: alert, confused, no facial asymmetry  Psych: Calm    Medications     0.9% sodium chloride + KCl 20 mEq/L 100 mL/hr at 08/05/20 0949       acetaminophen  1,000 mg Oral TID     atorvastatin  20 mg Oral At Bedtime     cefTRIAXone  1 g Intravenous Q24H     DULoxetine  30 mg Oral Daily     insulin aspart  1-6 Units Subcutaneous 4x Daily w/meals     Lacosamide  100 mg Oral BID     levETIRAcetam  750 mg Intravenous Q12H     [Held by provider] levETIRAcetam  750 mg Oral BID     magnesium oxide  400 mg Oral 4x Daily w/meals     omeprazole  40 mg Oral QAM     polyethylene glycol-propylene glycol  2 drop Both Eyes QPM     senna-docusate  1 tablet Oral BID    Or     senna-docusate  2 tablet Oral BID     sodium chloride (PF)  3 mL Intracatheter Q8H       Data   Recent Labs   Lab 08/05/20  0655 08/05/20  0009 08/04/20  1942   WBC 12.4*  --  16.3*   HGB 10.0* 10.3* 11.9*   MCV 88  --  89     --  302     --  135   POTASSIUM 4.9  --  4.3   CHLORIDE  107  --  102   CO2 26  --  26   BUN 20  --  22   CR 0.76  --  0.89   ANIONGAP 4  --  7   ALLISON 8.2*  --  9.1   *  --  176*   ALBUMIN 2.8*  --  3.4   PROTTOTAL 5.7*  --  6.7*   BILITOTAL 0.3  --  0.4   ALKPHOS 70  --  81   ALT 22  --  35   AST 12  --  21       Imaging  Recent Results (from the past 24 hour(s))   Head CT w/o contrast    Narrative    CT SCAN OF THE HEAD WITHOUT CONTRAST   8/4/2020 8:56 PM     HISTORY: Patient fell.    TECHNIQUE:  Axial images of the head and coronal reformations without  IV contrast material. Radiation dose for this scan was reduced using  automated exposure control, adjustment of the mA and/or kV according  to patient size, or iterative reconstruction technique.    COMPARISON: MR scan dated 7/5/2020    FINDINGS:     Intracranial contents: There is diffuse parenchymal volume loss.   White matter changes are present in the cerebral hemispheres that are  consistent with small vessel ischemic disease in this age patient.  There is a chronic area of encephalomalacia in the left frontal lobe.  Small areas of encephalomalacia are also seen in the cerebellum There  is no evidence of intracranial hemorrhage, mass, acute infarct or  anomaly.    Visualized orbits/sinuses/mastoids:  The visualized portions of the  sinuses and mastoids appear normal.    Osseous structures/soft tissues:  There is no evidence of trauma. No  intracranial hemorrhage or skull fractures are identified.      Impression    IMPRESSION: No intracranial bleed or skull fractures.      KAM PHAN MD   XR Chest 2 Views    Narrative    CHEST TWO VIEWS   8/4/2020 9:30 PM     HISTORY: Fall    COMPARISON: Chest x-ray on 7/4/2020      Impression    IMPRESSION: AP and lateral views of the chest were obtained.  Cardiomediastinal silhouette is within normal limits. Atherosclerotic  vascular calcification of the thoracic aorta and its major branches.  No suspicious focal pulmonary opacities. No significant pleural  effusion or  pneumothorax. Minimal displaced fracture of the  anterolateral aspect of the left sixth rib. Diffuse osteopenia with  multilevel degenerative changes of the spine.    KISHORE TAVAREZ MD   CT Abdomen Pelvis w/o Contrast    Narrative    EXAM: CT ABDOMEN PELVIS W/O CONTRAST  LOCATION: [facility]  DATE/TIME: 8/4/2020 10:54 PM    INDICATION: UTI, history of kidney stones, hematuria, left flank pain.    COMPARISON: 6/1/2020.    TECHNIQUE: CT scan of the abdomen and pelvis was performed without IV contrast. Multiplanar reformats were obtained. Dose reduction techniques were used.    CONTRAST: None.    FINDINGS:   LOWER CHEST: Patchy acute-appearing ground-glass infiltrate right lower lung concerning for pneumonitis. Clinical correlation.    HEPATOBILIARY: Liver is negative. Gallbladder appears absent. No biliary dilatation.    PANCREAS: Normal.    SPLEEN: Normal.    ADRENAL GLANDS: Normal.    KIDNEYS/BLADDER: There are a combination of multiple nonobstructing stones in both kidneys as well as vascular calcification. The largest stone in the right kidney measures 7 mm in diameter and the largest stone in the left kidney measures 9 mm. There is   a 5.3 cm complex cyst in the left kidney posteriorly containing a partially calcified septation which is unchanged compared to the prior study. No ureteral stones or hydronephrosis. Luis catheter in the bladder which is decompressed.    BOWEL: No evidence for bowel obstruction. Normal appendix. Large amounts of stool in the colon. Sigmoid diverticulosis without evidence for diverticulitis.    LYMPH NODES: Normal.    VASCULATURE: Diffuse aortoiliac atheromatous disease. Mild aneurysmal dilatation of the abdominal aorta measuring up to 3.2 cm.    PELVIC ORGANS: Normal.    MUSCULOSKELETAL: There is an acute mildly displaced fracture at the junction of the body of the right pubic bone and right superior pubic ramus. There is a subtle vertically oriented nondisplaced  fracture in the right iliac wing superiorly adjacent to   the SI joint. Mild compression fracture superior endplate of the L1 vertebral body. This was also present previously but appears slightly greater today. Clinical correlation. Grade 2 anterolisthesis and degenerative change of L5 on S1.    Small amount of high-density fluid and stranding seen in the lower pelvis anteriorly adjacent to the fracture site and extending into the space of Retzius likely reflecting hematoma or blood products related to the fracture.      Impression    IMPRESSION:   1.  There is an acute mildly displaced fracture involving the right pubic bone near the junction of the body and right superior pubic ramus. Small amount of surrounding high-density fluid and stranding including some extension into the space of Retzius   likely reflecting blood products related to the fracture. Clinical correlation.    2.  Additional acute nondisplaced vertically oriented fracture in the right iliac wing superiorly.    3.  Slight compression superior endplate of the L1 vertebral body. This was also present previously but appears slightly greater today. Clinical correlation.    4.  Nonobstructing intrarenal calculi bilaterally. No ureteral stones or hydronephrosis.    5.  Large amount of stool in the colon.    6.  Mild aneurysmal dilatation of the abdominal aorta measuring 3.2 cm.    7.  Patchy acute-appearing infiltrate right lower lung concerning for pneumonitis. Clinical correlation.

## 2020-08-05 NOTE — ED NOTES
Report off to Team 2 RN.  Haydee Donaldson RN,.......................................... 8/4/2020   10:57 PM

## 2020-08-05 NOTE — PHARMACY-ADMISSION MEDICATION HISTORY
Pharmacy Medication History  Admission medication history interview status for the 8/4/2020  admission is complete. See EPIC admission navigator for prior to admission medications     Medication history sources: MAR ([facility])  Medication history source reliability: Good  Adherence assessment: Good    Significant changes made to the medication list:  none      Additional medication history information:   none    Medication reconciliation completed by provider prior to medication history? No    Time spent in this activity: 15 min      Prior to Admission medications    Medication Sig Last Dose Taking? Auth Provider   acetaminophen (TYLENOL) 500 MG tablet Take 1,000 mg by mouth 2 times daily And 1000mg daily prn also.     for pain 8/4/2020 at am Yes Reported, Patient   aspirin (ASA) 81 MG EC tablet Take 1 tablet (81 mg) by mouth daily 8/4/2020 at am Yes Alexander Celestin APRN CNP   atorvastatin (LIPITOR) 20 MG tablet Take 1 tablet (20 mg) by mouth At Bedtime 8/3/2020 at pm Yes Garret Vargas MD   DULoxetine (CYMBALTA) 30 MG capsule TAKE 1 CAPSULE(30 MG) BY MOUTH DAILY 8/4/2020 at am Yes Stacia Petty APRN CNP   Lacosamide (VIMPAT) 100 MG TABS tablet Take 1 tablet (100 mg) by mouth 2 times daily . Future refills by PCP Dr. Matt Morrissey with phone number [phone]. 8/4/2020 at am Yes Brandyn Graves PA   levETIRAcetam (KEPPRA) 750 MG tablet Take 1 tablet (750 mg) by mouth 2 times daily 8/4/2020 at am Yes Alexander Celestin APRN CNP   magnesium oxide (MAG-OX) 400 MG tablet Take 400 mg by mouth 4 times daily (with meals and nightly) 8/4/2020 at X2 Yes Unknown, Entered By History   metFORMIN (GLUCOPHAGE) 1000 MG tablet Take 1 tablet (1,000 mg) by mouth 2 times daily (with meals)  Patient taking differently: Take 1,000 mg by mouth 2 times daily (with meals) Dx: supplement 8/4/2020 at am Yes Alexander Celestin APRN CNP   omeprazole (PRILOSEC) 40 MG DR capsule TAKE 1 CAPSULE(40 MG) BY MOUTH  DAILY 30 TO 60 MINUTES BEFORE A MEAL 8/4/2020 at am Yes Alexander Celestin APRN CNP   polyethylene glycol-propylene glycol (SYSTANE ULTRA) 0.4-0.3 % SOLN ophthalmic solution Place 2 drops into both eyes every evening Dx: Dry Eyes 8/3/2020 at pm Yes Unknown, Entered By History   ticagrelor (BRILINTA) 90 MG tablet Take 1 tablet (90 mg) by mouth 2 times daily 8/4/2020 at am Yes Alexander Celestin APRN CNP   Blood Glucose Monitoring Suppl FROYLAN 4 times daily (before meals and nightly)   Reported, Patient   ipratropium (ATROVENT) 0.03 % nasal spray INHALE 1 SPRAY IN EACH NOSTRIL EVERY 12 HOURS AS NEEDED prn  Matt Morrissey MD   nitroGLYcerin (NITROSTAT) 0.4 MG sublingual tablet For chest pain place 1 tablet under the tongue every 5 minutes for 3 doses. If symptoms persist 5 minutes after 1st dose call 911.   Brandyn Graves PA   Nutritional Supplements (NUTRITIONAL SUPPLEMENT PO) Diabetic HNS 8 oz BID btwn meals   Reported, Patient   polyethylene glycol-propylene glycol (SYSTANE ULTRA) 0.4-0.3 % SOLN ophthalmic solution Place 1 drop into both eyes daily as needed for dry eyes prn  Unknown, Entered By History   sennosides (SENOKOT) 8.6 MG tablet Take 1 tablet by mouth 2 times daily as needed for constipation prn  Reported, Patient

## 2020-08-05 NOTE — PROGRESS NOTES
08/05/20 1042   General Information   Onset Date 08/04/20   Start of Care Date 08/05/20   Referring Physician Dr. Segura   Patient Profile Review/OT: Additional Occupational Profile Info See Profile for full history and prior level of function   Patient/Family Goals Statement Patient states he does not like apple sauce.   Swallowing Evaluation Bedside swallow evaluation   Behaviorial Observations Alert;Confused;Distractible   Mode of current nutrition NPO   Respiratory Status Room air   Comments Dustin Pearce is a 89 year old male admitted on 8/4/2020. He presents after an unwitnessed fall at his care facility, concerned that he may have hit his head in the setting of mental slowing and anisocoria. Patient is well known to this department on multiple admissions.    Clinical Swallow Evaluation   Oral Musculature unable to assess due to poor participation/comprehension   Dentition present and adequate   Mucosal Quality dry   Clinical Swallow Eval: Nectar Thick Liquid Texture Trial   Mode of Presentation, Nectar cup;spoon;self-fed   Volume of Nectar Presented 3 oz of milk   Oral Phase, Nectar Premature pharyngeal entry;Poor AP movement   Pharyngeal Phase, Nectar impaired;reduction in laryngeal movement;repeated swallows   Diagnostic Statement Premature entry and delayed swallow.   Clinical Swallow Eval: Puree Solid Texture Trial   Mode of Presentation, Puree spoon;fed by clinician   Volume of Puree Presented 6 teaspoons of pudding   Oral Phase, Puree Poor AP movement;Premature pharyngeal entry   Pharyngeal Phase, Puree impaired;reduction in laryngeal movement;repeated swallows   Diagnostic Statement Delayed swallow and reduced laryngeal elevation.    Clinical Swallow Eval: Semisolid Texture Trial   Mode of Presentation, Semisolid fed by clinician;spoon   Volume of Semisolid Food Presented 1 bite   Oral Phase, Semisolid Poor AP movement;Residue in oral cavity;Premature pharyngeal entry   Oral Residue, Semisolid mid  posterior tongue;soft palate   Pharyngeal Phase, Semisolid impaired;reduction in laryngeal movement;repeated swallows  (Burping)   Diagnostic Statement Insufficient mastication with oral residue.    Swallow Compensations   Swallow Compensations Alternate viscosity of consistencies;Pacing;Reduce amounts;Multiple swallow   Results Oral difficulties only;Suspect silent aspiration   Esophageal Phase of Swallow   Patient reports or presents with symptoms of esophageal dysphagia Yes   Esophageal comments Burping after trials.   General Therapy Interventions   Planned Therapy Interventions Dysphagia Treatment   Dysphagia treatment Modified diet education;Instruction of safe swallow strategies   Swallow Eval: Clinical Impressions   Skilled Criteria for Therapy Intervention Skilled criteria met.  Treatment indicated.   Functional Assessment Scale (FAS) 3   Treatment Diagnosis Moderate dysphagia   Diet texture recommendations Dysphagia diet level 1;Nectar thick liquids   Recommended Feeding/Eating Techniques alternate between small bites and sips of food/liquid;check mouth frequently for oral residue/pocketing;maintain upright posture during/after eating for 30 mins;no straws;small sips/bites   Therapy Frequency 5x/week   Predicted Duration of Therapy Intervention (days/wks) 1 week   Anticipated Discharge Disposition extended care facility   Risks and Benefits of Treatment have been explained. Yes   Patient, family and/or staff in agreement with Plan of Care Yes   Clinical Impression Comments Patient presents with moderate oral and pharyngeal dysphagia at bedside charactrerized by delayed swallow response with nectar thick liquids by spoon and cup. Suspect penetration with large swallows via the cup. Decreased bolus coordination and AP transport with minimal oral residue and delayed swallow response woith pudding. Insufficient mastication of a cracker in pudding with reduced bolus control and AP transition. Delayed swallow  response with oral residue and multiple swallows to clear. Patient is at risk for aspiration given his swallow delay and cognitive impairment. Recommend: 1. trial a DDL 1 with nectar thick liquids. 2. Assistance with feeding, liquids by spoon, small bites, verify each slow and slow pace, alternating liquids/solids. Crush medications and place in pudding. Hold diet if Sx of aspiration present.    Total Evaluation Time   Total Evaluation Time (Minutes) 22

## 2020-08-05 NOTE — PROVIDER NOTIFICATION
MD Notification    Notified Person: MD    Notified Person Name: Dr. Segura    Notification Date/Time: 8/5 @ 0100    Notification Interaction: Page    Purpose of Notification: Pt coughing and dry-heaving with water intake. Failed 3oz dysphagia screen; requesting speech consult. Keep NPO until then?    Orders Received: SLP consult placed; NPO until seen

## 2020-08-05 NOTE — PLAN OF CARE
Arrived from the ED @ ~ 2330. Alert to self; baseline dementia. [place]. VSS. C/o ribcage pain; scheduled Tylenol. Coughing + dry-heaving with thin liquids & PO meds; NPO until SLP consult. Luis patent, cloudy yellow UO. Bruising to L ribcage. Bilateral healing scabs to knees. Abrasion to R arm; mepilex CDI. PIV infusing IVF @ 100mL/hr. Bedrest. Discharge pending; ortho, SLP & PT consulted.

## 2020-08-05 NOTE — PLAN OF CARE
"PT: Orders received, eval attempted. Pt resting in bed, when asked how pt is doing today he responds \"horseshit\". Offered to assist pt in repositioning, pt unable to verbalize why he is feeling that way, denies pain. Attempted to assist pt to initiate mobility to sit EOB, pt resisting attempts at mobility and ROM and states \"just not right now\". Unable to engage pt in OOB mobility at this time.  "

## 2020-08-05 NOTE — ED NOTES
"[facility]  ED Nurse Handoff Report    ED Chief complaint: Fall      ED Diagnosis:   Final diagnoses:   Bandemia   GALO (acute kidney injury) (H)   Fall, initial encounter       Code Status: Full Code    Allergies:   Allergies   Allergen Reactions     Oxycodone Other (See Comments)     \"TERRIBLE SWEATING\"     Sulfa Drugs      Tetracycline        Patient Story:   Pt had an unwitnessed fall at 1250 today, unknown LOC or if he hit his head. Report from staff 1 hour post fall pupil size change on R and then normalized. 1 hour PTA had 2 episodes of emesis prompting them to call EMS. Vitally stable on transport. No thinners, baseline dementia A&Ox2.    Focused Assessment:    Generalized weakness  A+O X2; Baseline Dementia; Pleasant, Calm; Co-operative with care.  Tachycardia in 100-110's bpm.  Indwelling ruiz with odorous dark yellow urine. Ruiz changed to obtain clean sample.    Treatments and/or interventions provided:   500 ML Bolus  2G Rocephin IV    Patient's response to treatments and/or interventions:   Heart rate in 90's following NS bolus.    To be done/followed up on inpatient unit:    Continue with plan of care.    Does this patient have any cognitive concerns?: Dementia    Activity level - Baseline/Home:  Stand with assist x2  Activity Level - Current:   Unknown    Patient's Preferred language: English   Needed?: No    Isolation: None  Infection: Other; Covid pending  Bariatric?: No    Vital Signs:   Vitals:    08/04/20 1815 08/04/20 1900 08/04/20 2000 08/04/20 2100   BP: 112/62 95/62 121/78 109/54   Pulse: 104 103 109 98   Resp:       Temp:       TempSrc:       SpO2: 98% 96% 97% 96%       Cardiac Rhythm:  sinus tachycardia    Was the PSS-3 completed:   Yes  What interventions are required if any?  none             Family Comments: Updated via phone  OBS brochure/video discussed/provided to patient/family: N/A              Name of person given brochure if not patient: n/a           "    Relationship to patient: n/a    For the majority of the shift this patient's behavior was Green.   Behavioral interventions performed were none.    ED NURSE PHONE NUMBER: [phone]

## 2020-08-05 NOTE — PLAN OF CARE
Alert to self only.  VSS.  Does not appear in pain at rest, on scheduled tylenol.  Speech eval and rec a DD1 with nectar diet, pt reports no appetite.  Took meds crushed in pudding.  Mag replaced per protocol, recheck 3.1.  Hemoglobin trending down 10->9.2.  Blood sugars stable.  Ortho recommends WBAT, PT consult pending.  Compression socks placed on LE.  Scattered bruising, scabs.  Wound on right arm. Ruiz patent, pt attempts to pull ruiz at times.  Cont to monitor.

## 2020-08-05 NOTE — CONSULTS
[facility]    Orthopedic Consultation    Dustin Pearce MRN# [id number]   Age: 89 year old YOB: 1928     Date of Admission: 8/4/2020    Reason for consult: Right superior pubic rami and iliac wing fractures       Requesting physician: Germain Segura       Level of consult: One-time consult to assist in determining a diagnosis, recommend an appropriate treatment plan and place orders           Assessment and Plan:   Assessment:   Right superior pubic rami and right superior iliac wing fractures      Plan:       Non-operative management recommended  Able to WBAT with walker  Pain medication as needed, limit narcotics as able  Able to progress in PT/OT as able  PCDs for DVT prophylaxis  Follow-up with Ward at [facility] in 6 weeks for recheck.  Call [phone] for appointment.               Chief Complaint:   Right          History of Present Illness:   This patient is a 89 year old male who presents with the following condition requiring a hospital admission: altered mental status following a fall  Patient sustained an unwitnessed fall yesterday at his LTC facility.  He was noted to have slowed mentation and anisocoria later in the afternoon and brought to the ED.  CT scan of the pelvis revealed Right superior pubic rami and right superior iliac wing fractures along with slight increased compression of an L1 fracture. Patient has history of frequent falls.  Cannot recall details of the fall at time of exam. Of note, I had seen the patient on 6/11 for left shoulder dislocation.           Past Medical History:     Past Medical History:   Diagnosis Date     Abdominal aortic aneurysm (H) 12/25/2016     Acute on chronic systolic congestive heart failure (H) 6/7/2018     Anemia 6/7/2018     Anemia due to blood loss, acute 6/13/2017     Aortic stenosis     mild     Benign essential hypertension 1/6/2017     Benign non-nodular prostatic hyperplasia with lower urinary tract symptoms 10/20/2016      CAD (coronary artery disease)     MI 1970     Carotid artery stenosis 10/20/2016    chronically occluded left internal carotid artery     Cerebrovascular accident (H) 10/20/2016     Cognitive impairment 6/7/2018     Coronary artery disease of native artery of native heart with stable angina pectoris (H) 10/20/2016    MI 1970     Depression, unspecified depression type 2/22/2018     Diabetes mellitus (H)      Diabetic ulcer of left foot associated with diabetes mellitus due to underlying condition (H) 6/12/2017     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     Gastro-oesophageal reflux disease      Gastroesophageal reflux disease without esophagitis 10/20/2016     Heart attack (H)      Hyperlipidemia      Hyperlipidemia, unspecified hyperlipidemia type 10/20/2016     Ischemic cardiomyopathy      Lumbar back pain 12/30/2016     Mild cognitive impairment 10/20/2016     Nephrolithiasis     RECURRENT     NSTEMI (non-ST elevated myocardial infarction) (H) 6/7/2018     Osteopenia      PAD (peripheral artery disease) (H)     peripheral angiogram 5/2017: The common iliac artery stenoses were stented and the superficial femoral artery stenoses were angioplastied     Paroxysmal atrial fibrillation (H)      Peripheral artery disease (H)     peripheral angiogram 5/2017: The common iliac artery stenoses were stented and the superficial femoral artery stenoses were angioplastied     RBBB with left anterior fascicular block      Slow transit constipation 2/22/2018     Stroke of unknown etiology (H) 12/31/2017     Syncope      TIA (transient ischemic attack) 1/20/2017     Unspecified cerebral artery occlusion with cerebral infarction      UTI (urinary tract infection) due to Enterococcus 2/22/2018     Ventricular tachycardia (H)     nonsustained             Past Surgical History:     Past Surgical History:   Procedure Laterality Date     AMPUTATE FOOT Left 6/4/2017    Procedure: AMPUTATE FOOT;  Sun Add#3: partial  left foot amputation - Sagital Saw - Mini C-Arm;  Surgeon: Moe Kirk DPM;  Location:  OR     CHOLECYSTECTOMY       ENDARTERECTOMY CAROTID Right 1/3/2018    Procedure: ENDARTERECTOMY CAROTID;  RIGHT CAROTID ENDARTERECTOMY WITH EEG;  Surgeon: Roland Rodriguez MD;  Location:  OR     ESOPHAGOSCOPY, GASTROSCOPY, DUODENOSCOPY (EGD), COMBINED N/A 2016    Procedure: COMBINED ESOPHAGOSCOPY, GASTROSCOPY, DUODENOSCOPY (EGD);  Surgeon: Nasim Louis MD;  Location:  GI     GENITOURINARY SURGERY      KIDNEY STONE REMOVAL X 4     HC UGI ENDOSCOPY W EUS N/A 2016    Procedure: COMBINED ENDOSCOPIC ULTRASOUND, ESOPHAGOSCOPY, GASTROSCOPY, DUODENOSCOPY (EGD);  Surgeon: Nasim Louis MD;  Location:  GI     HERNIA REPAIR       INCISION AND DRAINAGE FOOT, COMBINED Left 2017    Procedure: COMBINED INCISION AND DRAINAGE FOOT;  REVISIONAL LEFT FOOT INCISION AND DRAINAGE WITH POSSIBLE FLAP CLOSURE , ANTIBIOTIC SOLUTION ;  Surgeon: Nabil Ann DPM;  Location:  OR     LASER HOLMIUM LITHOTRIPSY URETER(S), INSERT STENT, COMBINED  3/2/2012    Procedure:COMBINED CYSTOSCOPY, URETEROSCOPY, LASER HOLMIUM LITHOTRIPSY URETER(S), INSERT STENT; CYSTOSCOPY, RIGHT RETROGRADES, RIGHT URETEROSCOPY, HOLMIUM LASER, RIGHT STENT PLACEMENT; Surgeon:ANJELICA LEÓN; Location: OR     ROTATOR CUFF REPAIR RT/LT               Social History:     Social History     Tobacco Use     Smoking status: Former Smoker     Packs/day: 1.00     Years: 30.00     Pack years: 30.00     Types: Cigarettes     Start date:      Last attempt to quit: 1999     Years since quittin.6     Smokeless tobacco: Never Used   Substance Use Topics     Alcohol use: Not Currently     Alcohol/week: 7.0 standard drinks     Types: 7 Standard drinks or equivalent per week     Comment: 1 drink per biweekly             Family History:     Family History   Problem Relation Age of Onset     Breast Cancer Mother       "Heart Failure Father 81             Immunizations:     VACCINE/DOSE   Diptheria   DPT   DTAP   HBIG   Hepatitis A   Hepatitis B   HIB   Influenza   Measles   Meningococcal   MMR   Mumps   Pneumococcal   Polio   Rubella   Small Pox   TDAP   Varicella   Zoster             Allergies:     Allergies   Allergen Reactions     Oxycodone Other (See Comments)     \"TERRIBLE SWEATING\"     Sulfa Drugs      Tetracycline              Medications:     Current Facility-Administered Medications   Medication     0.9% sodium chloride + KCl 20 mEq/L infusion     acetaminophen (TYLENOL) tablet 1,000 mg     acetaminophen (TYLENOL) tablet 500 mg     atorvastatin (LIPITOR) tablet 20 mg     bisacodyl (DULCOLAX) Suppository 10 mg     cefTRIAXone (ROCEPHIN) 1 g vial to attach to  mL bag for ADULTS or NS 50 mL bag for PEDS     glucose gel 15-30 g    Or     dextrose 50 % injection 25-50 mL    Or     glucagon injection 1 mg     DULoxetine (CYMBALTA) DR capsule 30 mg     HYDROmorphone (PF) (DILAUDID) injection 0.2 mg     insulin aspart (NovoLOG) injection (RAPID ACTING)     lacosamide (VIMPAT) tablet 100 mg     levETIRAcetam (KEPPRA) 750 mg in sodium chloride 0.9 % 100 mL intermittent infusion     [Held by provider] levETIRAcetam (KEPPRA) tablet 750 mg     lidocaine (LMX4) cream     lidocaine 1 % 0.1-1 mL     magnesium oxide (MAG-OX) tablet 400 mg     magnesium sulfate 4 g in 100 mL sterile water (premade)     melatonin tablet 1 mg     naloxone (NARCAN) injection 0.1-0.4 mg     nitroGLYcerin (NITROSTAT) sublingual tablet 0.4 mg     omeprazole (priLOSEC) CR capsule 40 mg     ondansetron (ZOFRAN-ODT) ODT tab 4 mg    Or     ondansetron (ZOFRAN) injection 4 mg     polyethylene glycol (MIRALAX) Packet 17 g     polyethylene glycol-propylene glycol (SYSTANE ULTRA) ophthalmic solution 1 drop     polyethylene glycol-propylene glycol (SYSTANE ULTRA) ophthalmic solution 2 drop     potassium chloride (KLOR-CON) Packet 20-40 mEq     potassium chloride 10 " mEq in 100 mL intermittent infusion with 10 mg lidocaine     potassium chloride 10 mEq in 100 mL sterile water intermittent infusion (premix)     potassium chloride 20 mEq in 50 mL intermittent infusion     potassium chloride ER (KLOR-CON M) CR tablet 20-40 mEq     prochlorperazine (COMPAZINE) injection 5 mg    Or     prochlorperazine (COMPAZINE) tablet 5 mg    Or     prochlorperazine (COMPAZINE) Suppository 12.5 mg     senna-docusate (SENOKOT-S/PERICOLACE) 8.6-50 MG per tablet 1 tablet    Or     senna-docusate (SENOKOT-S/PERICOLACE) 8.6-50 MG per tablet 2 tablet     senna-docusate (SENOKOT-S/PERICOLACE) 8.6-50 MG per tablet 1 tablet    Or     senna-docusate (SENOKOT-S/PERICOLACE) 8.6-50 MG per tablet 2 tablet     sodium chloride (PF) 0.9% PF flush 3 mL     sodium chloride (PF) 0.9% PF flush 3 mL             Review of Systems:   ROS:  10 point ROS neg other than the symptoms noted above in the HPI.            Physical Exam:   All vitals have been reviewed  Patient Vitals for the past 24 hrs:   BP Temp Temp src Pulse Heart Rate Resp SpO2 Weight   08/05/20 0801 119/53 98.3  F (36.8  C) Oral -- 88 16 93 % --   08/05/20 0200 -- -- -- -- -- -- -- 56.1 kg (123 lb 11.2 oz)   08/04/20 2328 124/52 98.3  F (36.8  C) Oral -- 93 16 95 % 56.1 kg (123 lb 11.2 oz)   08/04/20 2230 106/52 -- -- 97 -- -- 95 % --   08/04/20 2100 109/54 -- -- 98 -- -- 96 % --   08/04/20 2000 121/78 -- -- 109 -- -- 97 % --   08/04/20 1900 95/62 -- -- 103 -- -- 96 % --   08/04/20 1815 112/62 -- -- 104 -- -- 98 % --   08/04/20 1804 (!) 134/96 97.5  F (36.4  C) Oral 104 -- 16 98 % --       Intake/Output Summary (Last 24 hours) at 8/5/2020 1144  Last data filed at 8/5/2020 0626  Gross per 24 hour   Intake --   Output 150 ml   Net -150 ml         Physical Exam   Temp: 98.3  F (36.8  C) Temp src: Oral BP: 119/53 Pulse: 97 Heart Rate: 88 Resp: 16 SpO2: 93 % O2 Device: None (Room air)    Vital Signs with Ranges  Temp:  [97.5  F (36.4  C)-98.3  F (36.8  C)] 98.3   F (36.8  C)  Pulse:  [] 97  Heart Rate:  [88-93] 88  Resp:  [16] 16  BP: ()/(52-96) 119/53  SpO2:  [93 %-98 %] 93 %  123 lbs 11.2 oz    Constitutional: Pleasant, alert, appropriate, following commands.  HEENT: Head atraumatic normocephalic. Pupils equal round and reactive to light.    Alert and oriented x 3, resting comfortably in bed.   On physical exam of bilateral lower extremities, patient's legs are resting in a neutral position.  No skin abrasions or ecchymosis seen at hip.  Denies pain with hip rotation bilaterally.  Pain in the right groin region with active left hip flexion.  Straight leg raise negative for radiculopathy.  Patient is able to dorsi and plantarflex both ankles with equal resistance.  Able to flex bilateral knees with right groin pain.  Full sensation to light touch on the left versus right lower extremities.  Distal pulses are intact and equal bilaterally.             Data:   All laboratory data reviewed  Results for orders placed or performed during the hospital encounter of 08/04/20   Head CT w/o contrast     Status: None    Narrative    CT SCAN OF THE HEAD WITHOUT CONTRAST   8/4/2020 8:56 PM     HISTORY: Patient fell.    TECHNIQUE:  Axial images of the head and coronal reformations without  IV contrast material. Radiation dose for this scan was reduced using  automated exposure control, adjustment of the mA and/or kV according  to patient size, or iterative reconstruction technique.    COMPARISON: MR scan dated 7/5/2020    FINDINGS:     Intracranial contents: There is diffuse parenchymal volume loss.   White matter changes are present in the cerebral hemispheres that are  consistent with small vessel ischemic disease in this age patient.  There is a chronic area of encephalomalacia in the left frontal lobe.  Small areas of encephalomalacia are also seen in the cerebellum There  is no evidence of intracranial hemorrhage, mass, acute infarct or  anomaly.    Visualized  orbits/sinuses/mastoids:  The visualized portions of the  sinuses and mastoids appear normal.    Osseous structures/soft tissues:  There is no evidence of trauma. No  intracranial hemorrhage or skull fractures are identified.      Impression    IMPRESSION: No intracranial bleed or skull fractures.      KAM PHAN MD   XR Chest 2 Views     Status: None    Narrative    CHEST TWO VIEWS   8/4/2020 9:30 PM     HISTORY: Fall    COMPARISON: Chest x-ray on 7/4/2020      Impression    IMPRESSION: AP and lateral views of the chest were obtained.  Cardiomediastinal silhouette is within normal limits. Atherosclerotic  vascular calcification of the thoracic aorta and its major branches.  No suspicious focal pulmonary opacities. No significant pleural  effusion or pneumothorax. Minimal displaced fracture of the  anterolateral aspect of the left sixth rib. Diffuse osteopenia with  multilevel degenerative changes of the spine.    KISHORE TAVAREZ MD   CT Abdomen Pelvis w/o Contrast     Status: None    Narrative    EXAM: CT ABDOMEN PELVIS W/O CONTRAST  LOCATION: [facility]  DATE/TIME: 8/4/2020 10:54 PM    INDICATION: UTI, history of kidney stones, hematuria, left flank pain.    COMPARISON: 6/1/2020.    TECHNIQUE: CT scan of the abdomen and pelvis was performed without IV contrast. Multiplanar reformats were obtained. Dose reduction techniques were used.    CONTRAST: None.    FINDINGS:   LOWER CHEST: Patchy acute-appearing ground-glass infiltrate right lower lung concerning for pneumonitis. Clinical correlation.    HEPATOBILIARY: Liver is negative. Gallbladder appears absent. No biliary dilatation.    PANCREAS: Normal.    SPLEEN: Normal.    ADRENAL GLANDS: Normal.    KIDNEYS/BLADDER: There are a combination of multiple nonobstructing stones in both kidneys as well as vascular calcification. The largest stone in the right kidney measures 7 mm in diameter and the largest stone in the left kidney measures 9 mm. There  is   a 5.3 cm complex cyst in the left kidney posteriorly containing a partially calcified septation which is unchanged compared to the prior study. No ureteral stones or hydronephrosis. Luis catheter in the bladder which is decompressed.    BOWEL: No evidence for bowel obstruction. Normal appendix. Large amounts of stool in the colon. Sigmoid diverticulosis without evidence for diverticulitis.    LYMPH NODES: Normal.    VASCULATURE: Diffuse aortoiliac atheromatous disease. Mild aneurysmal dilatation of the abdominal aorta measuring up to 3.2 cm.    PELVIC ORGANS: Normal.    MUSCULOSKELETAL: There is an acute mildly displaced fracture at the junction of the body of the right pubic bone and right superior pubic ramus. There is a subtle vertically oriented nondisplaced fracture in the right iliac wing superiorly adjacent to   the SI joint. Mild compression fracture superior endplate of the L1 vertebral body. This was also present previously but appears slightly greater today. Clinical correlation. Grade 2 anterolisthesis and degenerative change of L5 on S1.    Small amount of high-density fluid and stranding seen in the lower pelvis anteriorly adjacent to the fracture site and extending into the space of Retzius likely reflecting hematoma or blood products related to the fracture.      Impression    IMPRESSION:   1.  There is an acute mildly displaced fracture involving the right pubic bone near the junction of the body and right superior pubic ramus. Small amount of surrounding high-density fluid and stranding including some extension into the space of Retzius   likely reflecting blood products related to the fracture. Clinical correlation.    2.  Additional acute nondisplaced vertically oriented fracture in the right iliac wing superiorly.    3.  Slight compression superior endplate of the L1 vertebral body. This was also present previously but appears slightly greater today. Clinical correlation.    4.   Nonobstructing intrarenal calculi bilaterally. No ureteral stones or hydronephrosis.    5.  Large amount of stool in the colon.    6.  Mild aneurysmal dilatation of the abdominal aorta measuring 3.2 cm.    7.  Patchy acute-appearing infiltrate right lower lung concerning for pneumonitis. Clinical correlation.     CBC with platelets differential     Status: Abnormal   Result Value Ref Range    WBC 16.3 (H) 4.0 - 11.0 10e9/L    RBC Count 4.24 (L) 4.4 - 5.9 10e12/L    Hemoglobin 11.9 (L) 13.3 - 17.7 g/dL    Hematocrit 37.7 (L) 40.0 - 53.0 %    MCV 89 78 - 100 fl    MCH 28.1 26.5 - 33.0 pg    MCHC 31.6 31.5 - 36.5 g/dL    RDW 15.3 (H) 10.0 - 15.0 %    Platelet Count 302 150 - 450 10e9/L    Diff Method Automated Method     % Neutrophils 80.6 %    % Lymphocytes 9.1 %    % Monocytes 9.7 %    % Eosinophils 0.1 %    % Basophils 0.1 %    % Immature Granulocytes 0.4 %    Nucleated RBCs 0 0 /100    Absolute Neutrophil 13.2 (H) 1.6 - 8.3 10e9/L    Absolute Lymphocytes 1.5 0.8 - 5.3 10e9/L    Absolute Monocytes 1.6 (H) 0.0 - 1.3 10e9/L    Absolute Eosinophils 0.0 0.0 - 0.7 10e9/L    Absolute Basophils 0.0 0.0 - 0.2 10e9/L    Abs Immature Granulocytes 0.1 0 - 0.4 10e9/L    Absolute Nucleated RBC 0.0    Basic metabolic panel     Status: Abnormal   Result Value Ref Range    Sodium 135 133 - 144 mmol/L    Potassium 4.3 3.4 - 5.3 mmol/L    Chloride 102 94 - 109 mmol/L    Carbon Dioxide 26 20 - 32 mmol/L    Anion Gap 7 3 - 14 mmol/L    Glucose 176 (H) 70 - 99 mg/dL    Urea Nitrogen 22 7 - 30 mg/dL    Creatinine 0.89 0.66 - 1.25 mg/dL    GFR Estimate 74 >60 mL/min/[1.73_m2]    GFR Estimate If Black 86 >60 mL/min/[1.73_m2]    Calcium 9.1 8.5 - 10.1 mg/dL   UA with Microscopic     Status: Abnormal   Result Value Ref Range    Color Urine Yellow     Appearance Urine Cloudy     Glucose Urine Negative NEG^Negative mg/dL    Bilirubin Urine Negative NEG^Negative    Ketones Urine 10 (A) NEG^Negative mg/dL    Specific Gravity Urine 1.028 1.003 -  1.035    Blood Urine Moderate (A) NEG^Negative    pH Urine 5.5 5.0 - 7.0 pH    Protein Albumin Urine 100 (A) NEG^Negative mg/dL    Urobilinogen mg/dL 2.0 0.0 - 2.0 mg/dL    Nitrite Urine Negative NEG^Negative    Leukocyte Esterase Urine Large (A) NEG^Negative    Source Catheterized Urine     WBC Urine 84 (H) 0 - 5 /HPF    RBC Urine >182 (H) 0 - 2 /HPF    Squamous Epithelial /HPF Urine 4 (H) 0 - 1 /HPF    Mucous Urine Present (A) NEG^Negative /LPF   Hemoglobin     Status: Abnormal   Result Value Ref Range    Hemoglobin 10.3 (L) 13.3 - 17.7 g/dL   Comprehensive metabolic panel     Status: Abnormal   Result Value Ref Range    Sodium 137 133 - 144 mmol/L    Potassium 4.9 3.4 - 5.3 mmol/L    Chloride 107 94 - 109 mmol/L    Carbon Dioxide 26 20 - 32 mmol/L    Anion Gap 4 3 - 14 mmol/L    Glucose 160 (H) 70 - 99 mg/dL    Urea Nitrogen 20 7 - 30 mg/dL    Creatinine 0.76 0.66 - 1.25 mg/dL    GFR Estimate 79 >60 mL/min/[1.73_m2]    GFR Estimate If Black >90 >60 mL/min/[1.73_m2]    Calcium 8.2 (L) 8.5 - 10.1 mg/dL    Bilirubin Total 0.3 0.2 - 1.3 mg/dL    Albumin 2.8 (L) 3.4 - 5.0 g/dL    Protein Total 5.7 (L) 6.8 - 8.8 g/dL    Alkaline Phosphatase 70 40 - 150 U/L    ALT 22 0 - 70 U/L    AST 12 0 - 45 U/L   CBC with platelets     Status: Abnormal   Result Value Ref Range    WBC 12.4 (H) 4.0 - 11.0 10e9/L    RBC Count 3.50 (L) 4.4 - 5.9 10e12/L    Hemoglobin 10.0 (L) 13.3 - 17.7 g/dL    Hematocrit 30.8 (L) 40.0 - 53.0 %    MCV 88 78 - 100 fl    MCH 28.6 26.5 - 33.0 pg    MCHC 32.5 31.5 - 36.5 g/dL    RDW 15.5 (H) 10.0 - 15.0 %    Platelet Count 242 150 - 450 10e9/L   Hepatic panel     Status: Abnormal   Result Value Ref Range    Bilirubin Direct <0.1 0.0 - 0.2 mg/dL    Bilirubin Total 0.4 0.2 - 1.3 mg/dL    Albumin 3.4 3.4 - 5.0 g/dL    Protein Total 6.7 (L) 6.8 - 8.8 g/dL    Alkaline Phosphatase 81 40 - 150 U/L    ALT 35 0 - 70 U/L    AST 21 0 - 45 U/L   Magnesium     Status: Abnormal   Result Value Ref Range    Magnesium 1.4  (L) 1.6 - 2.3 mg/dL   Glucose by meter     Status: Abnormal   Result Value Ref Range    Glucose 134 (H) 70 - 99 mg/dL   EKG 12 lead     Status: None   Result Value Ref Range    Interpretation ECG Click View Image link to view waveform and result           Attestation:  I have reviewed today's vital signs, notes, medications, labs and imaging with Dr. Sandoval.  Amount of time performed on this consult: 30 minutes.    Portia Walton PA-C

## 2020-08-05 NOTE — PROGRESS NOTES
RECEIVING UNIT ED HANDOFF REVIEW    ED Nurse Handoff Report was reviewed by: Kenneth Ashby RN on August 4, 2020 at 11:07 PM

## 2020-08-05 NOTE — PLAN OF CARE
Discharge Planner SLP   Patient plan for discharge: Not addressed.   Current status: Bedside swallow evaluation was completed per MD orders. Patient presents with moderate oral and pharyngeal dysphagia at bedside charactrerized by delayed swallow response with nectar thick liquids by spoon and cup. Suspect penetration with large swallows via the cup. Decreased bolus coordination and AP transport with minimal oral residue and delayed swallow response woith pudding. Insufficient mastication of a cracker in pudding with reduced bolus control and AP transition. Delayed swallow response with oral residue and multiple swallows to clear. Patient is at risk for aspiration given his swallow delay and cognitive impairment.     Recommend: 1. trial a DDL 1 with nectar thick liquids. 2. Assistance with feeding, liquids by spoon, small bites, verify each slow and slow pace, alternating liquids/solids. Crush medications and place in pudding. Hold diet if Sx of aspiration present.   Barriers to return to prior living situation: None  Recommendations for discharge: Return to [facility] when stable.  Rationale for recommendations: Patient may need short term ST needs for dysphagia management and advancement as indicated.        Entered by: Chloe Mclain 08/05/2020 11:19 AM

## 2020-08-06 NOTE — CONSULTS
[facility] [place]  [facility]    Neurology Consultation    Dustin Pearce MRN# [id number]   YOB: 1928 Age: 89 year old    Code Status:Full Code   Date of Admission: 8/4/2020  Date of Consult:08/06/2020                                                                                       Assessment and Plan:                                         #. Encephalopathy-  Multi factorial related to uti, probable effect of concussion from repeated falls.  Baseline  dementia-likely vascular given history of stroke.     #. Previous diagnosis of seizure disorder  --As low suspicion of seizure, will not recommend EEG at this time.  Given slightly high level of levetiracetam , lower dose to 500mg in am and 750mg pm.    /lacosamide as PTA-contact service if level is out of range.     Treatment of UTI/Supportive treatment per hospital service    Will sign off, please contact general neurology service if follow up needed.   Can follow up as out patient with Dr. Padilla who regularly follows.    Prognosis guarded given age and multiple medical/neurologic comorbidities      ----------------------------------------------------------------------------------  ----------------------------------------------------------------------------------  Reason for consult: I was asked by Dr. Jenkins to evaluate this patient forAcute encephalopathy, on antibiotics for possible UTI.  Known seizure disorder on Keppra [level slightly high] and lacosamide.  Remains very lethargic...         Chief Complaint:   Pt unable to offer  History is obtained from the patient / chart       History of Present Illness:   This patient is a 89 year old male who has a history of remote left MCA territory stroke more than 10 years ago.  He has had numerous hospitalizations over the last few years related to possible TIA/neurologic events.  He also had been diagnosed with probable seizures for which he has had  multiple anticonvulsant medication changes.  He is generally followed by Dr. Padilla as an outpatient.  He has been declining cognitively over the last 6 months.  Most of the information was obtained from his wife who was present at the bedside.  He has been in and out of care facilities for the last 8 months.  For the last 2 months he has been at [facility].  He had been improving but had fallen 3 times in the last week while using his walker.  She does report that his memory and language ability had been declining over the last 6 months.  Prior to that he had lived at home.  He is hospitalized this admission on August 4 after another fall.  He has been found to have pelvic fracture, rib fracture.  He has also been diagnosed with a urinary tract infection which is under treatment.  He remains encephalopathic and lethargic.  His levetiracetam level was noted to be elevated and neurology input was requested.       Past Medical History:     Past Medical History:   Diagnosis Date     Abdominal aortic aneurysm (H) 12/25/2016     Acute on chronic systolic congestive heart failure (H) 6/7/2018     Anemia 6/7/2018     Anemia due to blood loss, acute 6/13/2017     Aortic stenosis     mild     Benign essential hypertension 1/6/2017     Benign non-nodular prostatic hyperplasia with lower urinary tract symptoms 10/20/2016     CAD (coronary artery disease)     MI 1970     Carotid artery stenosis 10/20/2016    chronically occluded left internal carotid artery     Cerebrovascular accident (H) 10/20/2016     Cognitive impairment 6/7/2018     Coronary artery disease of native artery of native heart with stable angina pectoris (H) 10/20/2016    MI 1970     Depression, unspecified depression type 2/22/2018     Diabetes mellitus (H)      Diabetic ulcer of left foot associated with diabetes mellitus due to underlying condition (H) 6/12/2017     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      Gastro-oesophageal reflux disease      Gastroesophageal reflux disease without esophagitis 10/20/2016     Heart attack (H)      Hyperlipidemia      Hyperlipidemia, unspecified hyperlipidemia type 10/20/2016     Ischemic cardiomyopathy      Lumbar back pain 12/30/2016     Mild cognitive impairment 10/20/2016     Nephrolithiasis     RECURRENT     NSTEMI (non-ST elevated myocardial infarction) (H) 6/7/2018     Osteopenia      PAD (peripheral artery disease) (H)     peripheral angiogram 5/2017: The common iliac artery stenoses were stented and the superficial femoral artery stenoses were angioplastied     Paroxysmal atrial fibrillation (H)      Peripheral artery disease (H)     peripheral angiogram 5/2017: The common iliac artery stenoses were stented and the superficial femoral artery stenoses were angioplastied     RBBB with left anterior fascicular block      Slow transit constipation 2/22/2018     Stroke of unknown etiology (H) 12/31/2017     Syncope      TIA (transient ischemic attack) 1/20/2017     Unspecified cerebral artery occlusion with cerebral infarction      UTI (urinary tract infection) due to Enterococcus 2/22/2018     Ventricular tachycardia (H)     nonsustained         Past Surgical History:     Past Surgical History:   Procedure Laterality Date     AMPUTATE FOOT Left 6/4/2017    Procedure: AMPUTATE FOOT;  Sun Add#3: partial left foot amputation - Sagital Saw - Mini C-Arm;  Surgeon: Moe Kirk DPM;  Location:  OR     CHOLECYSTECTOMY       ENDARTERECTOMY CAROTID Right 1/3/2018    Procedure: ENDARTERECTOMY CAROTID;  RIGHT CAROTID ENDARTERECTOMY WITH EEG;  Surgeon: Roland Rodriguez MD;  Location:  OR     ESOPHAGOSCOPY, GASTROSCOPY, DUODENOSCOPY (EGD), COMBINED N/A 12/26/2016    Procedure: COMBINED ESOPHAGOSCOPY, GASTROSCOPY, DUODENOSCOPY (EGD);  Surgeon: Nasim Louis MD;  Location:  GI     GENITOURINARY SURGERY      KIDNEY STONE REMOVAL X 4     HC UGI ENDOSCOPY W EUS N/A  2016    Procedure: COMBINED ENDOSCOPIC ULTRASOUND, ESOPHAGOSCOPY, GASTROSCOPY, DUODENOSCOPY (EGD);  Surgeon: Nasim Louis MD;  Location:  GI     HERNIA REPAIR       INCISION AND DRAINAGE FOOT, COMBINED Left 2017    Procedure: COMBINED INCISION AND DRAINAGE FOOT;  REVISIONAL LEFT FOOT INCISION AND DRAINAGE WITH POSSIBLE FLAP CLOSURE , ANTIBIOTIC SOLUTION ;  Surgeon: Nabil Ann DPM;  Location:  OR     LASER HOLMIUM LITHOTRIPSY URETER(S), INSERT STENT, COMBINED  3/2/2012    Procedure:COMBINED CYSTOSCOPY, URETEROSCOPY, LASER HOLMIUM LITHOTRIPSY URETER(S), INSERT STENT; CYSTOSCOPY, RIGHT RETROGRADES, RIGHT URETEROSCOPY, HOLMIUM LASER, RIGHT STENT PLACEMENT; Surgeon:ANJELICA LEÓN; Location: OR     ROTATOR CUFF REPAIR RT/LT            Social History:     Social History     Socioeconomic History     Marital status:      Spouse name: Not on file     Number of children: Not on file     Years of education: Not on file     Highest education level: Not on file   Occupational History     Not on file   Social Needs     Financial resource strain: Not on file     Food insecurity     Worry: Never true     Inability: Never true     Transportation needs     Medical: No     Non-medical: No   Tobacco Use     Smoking status: Former Smoker     Packs/day: 1.00     Years: 30.00     Pack years: 30.00     Types: Cigarettes     Start date:      Last attempt to quit: 1999     Years since quittin.6     Smokeless tobacco: Never Used   Substance and Sexual Activity     Alcohol use: Not Currently     Alcohol/week: 7.0 standard drinks     Types: 7 Standard drinks or equivalent per week     Comment: 1 drink per biweekly     Drug use: No     Sexual activity: Not Currently     Partners: Female   Lifestyle     Physical activity     Days per week: 0 days     Minutes per session: 0 min     Stress: Not at all   Relationships     Social connections     Talks on phone: Not on file     Gets  together: Not on file     Attends Rastafarian service: Not on file     Active member of club or organization: Not on file     Attends meetings of clubs or organizations: Not on file     Relationship status: Not on file     Intimate partner violence     Fear of current or ex partner: Not on file     Emotionally abused: Not on file     Physically abused: Not on file     Forced sexual activity: Not on file   Other Topics Concern     Parent/sibling w/ CABG, MI or angioplasty before 65F 55M? Not Asked   Social History Narrative     Not on file     Patient denies smoking, no significant alcohol intake, denies illicit drugs use  Has been in and out of [facility] since 2020, at [facility] since        Family History:     Family History   Problem Relation Age of Onset     Breast Cancer Mother      Heart Failure Father 81     Reviewed and not felt to be contributory.        Home Medications:     Prior to Admission Medications   Prescriptions Last Dose Informant Patient Reported? Taking?   Blood Glucose Monitoring Suppl FROYLAN  Nursing Home Yes No   Si times daily (before meals and nightly)   DULoxetine (CYMBALTA) 30 MG capsule 2020 at am Nursing Home No Yes   Sig: TAKE 1 CAPSULE(30 MG) BY MOUTH DAILY   Lacosamide (VIMPAT) 100 MG TABS tablet 2020 at am Nursing Home No Yes   Sig: Take 1 tablet (100 mg) by mouth 2 times daily . Future refills by PCP Dr. Matt Morrissey with phone number [phone].   Nutritional Supplements (NUTRITIONAL SUPPLEMENT PO)  Nursing Home Yes No   Sig: Diabetic HNS 8 oz BID btwn meals   acetaminophen (TYLENOL) 500 MG tablet 2020 at am Nursing Home Yes Yes   Sig: Take 1,000 mg by mouth 2 times daily And 1000mg daily prn also.     for pain   aspirin (ASA) 81 MG EC tablet 2020 at am Nursing Home No Yes   Sig: Take 1 tablet (81 mg) by mouth daily   atorvastatin (LIPITOR) 20 MG tablet 8/3/2020 at pm Nursing Home No Yes   Sig: Take 1 tablet (20 mg) by mouth At Bedtime  "  ipratropium (ATROVENT) 0.03 % nasal spray prn Nursing Home No No   Sig: INHALE 1 SPRAY IN EACH NOSTRIL EVERY 12 HOURS AS NEEDED   levETIRAcetam (KEPPRA) 750 MG tablet 8/4/2020 at am Nursing Home No Yes   Sig: Take 1 tablet (750 mg) by mouth 2 times daily   magnesium oxide (MAG-OX) 400 MG tablet 8/4/2020 at X2 Nursing Home Yes Yes   Sig: Take 400 mg by mouth 4 times daily (with meals and nightly)   metFORMIN (GLUCOPHAGE) 1000 MG tablet 8/4/2020 at am Nursing Home No Yes   Sig: Take 1 tablet (1,000 mg) by mouth 2 times daily (with meals)   Patient taking differently: Take 1,000 mg by mouth 2 times daily (with meals) Dx: supplement   nitroGLYcerin (NITROSTAT) 0.4 MG sublingual tablet  Nursing Home No No   Sig: For chest pain place 1 tablet under the tongue every 5 minutes for 3 doses. If symptoms persist 5 minutes after 1st dose call 911.   omeprazole (PRILOSEC) 40 MG DR capsule 8/4/2020 at am Nursing Home No Yes   Sig: TAKE 1 CAPSULE(40 MG) BY MOUTH DAILY 30 TO 60 MINUTES BEFORE A MEAL   polyethylene glycol-propylene glycol (SYSTANE ULTRA) 0.4-0.3 % SOLN ophthalmic solution 8/3/2020 at pm Nursing Home Yes Yes   Sig: Place 2 drops into both eyes every evening Dx: Dry Eyes   polyethylene glycol-propylene glycol (SYSTANE ULTRA) 0.4-0.3 % SOLN ophthalmic solution prn Nursing Home Yes No   Sig: Place 1 drop into both eyes daily as needed for dry eyes   sennosides (SENOKOT) 8.6 MG tablet prn Nursing Home Yes No   Sig: Take 1 tablet by mouth 2 times daily as needed for constipation   ticagrelor (BRILINTA) 90 MG tablet 8/4/2020 at am Nursing Home No Yes   Sig: Take 1 tablet (90 mg) by mouth 2 times daily      Facility-Administered Medications: None          Allergy:     Allergies   Allergen Reactions     Oxycodone Other (See Comments)     \"TERRIBLE SWEATING\"     Sulfa Drugs      Tetracycline           Inpatient Medications:   Scheduled Meds:    acetaminophen  1,000 mg Oral TID     atorvastatin  20 mg Oral At Bedtime     " cefTRIAXone  1 g Intravenous Q24H     DULoxetine  30 mg Oral Daily     insulin aspart  1-6 Units Subcutaneous 4x Daily w/meals     Lacosamide  100 mg Oral BID     levETIRAcetam  750 mg Oral BID     magnesium oxide  400 mg Oral 4x Daily w/meals     omeprazole  40 mg Oral QAM     polyethylene glycol-propylene glycol  2 drop Both Eyes QPM     senna-docusate  1 tablet Oral BID    Or     senna-docusate  2 tablet Oral BID     sodium chloride (PF)  3 mL Intracatheter Q8H     PRN Meds: acetaminophen, bisacodyl, glucose **OR** dextrose **OR** glucagon, HYDROmorphone, lidocaine 4%, lidocaine (buffered or not buffered), magnesium sulfate, melatonin, naloxone, nitroGLYcerin, ondansetron **OR** ondansetron, polyethylene glycol, polyethylene glycol-propylene glycol, potassium chloride, potassium chloride with lidocaine, potassium chloride, potassium chloride, potassium chloride, prochlorperazine **OR** prochlorperazine **OR** prochlorperazine, senna-docusate **OR** senna-docusate, sodium chloride (PF), sodium phosphate, sodium phosphate, sodium phosphate        Review of Systems    Patient unable to participate as confused/lethargic  NEURO: see HPI       Physical Exam:   Physical Exam   Vitals:  Height:Data Unavailable  Weight:139 lbs 6.4 oz   Temp: 98.5  F (36.9  C) Temp src: Oral BP: 124/52   Heart Rate: 99 Resp: 16 SpO2: 91 % O2 Device: None (Room air)    General Appearance:  No acute distress, lethargic  Neuro:       Mental Status Exam:    Arousable.  Able to follow simple commands. Speech dysarthric. Oriented to self only.  Not able to participate in conversation.         Cranial Nerves:   Vision grossly intact.  EOMI, FS, otherwise unable to cooperate         Motor:   Decreased effort.  Tone and bulk nl x4           Reflexes:  Absent ankles and knees, reduced arms.  Plantar nl bl       Sensory:  Unable                    Coordination:   Able to wiggle toes and fingers to commands       Gait:  Unable    Neck: no nuchal  rigidity, normal thyroid. No carotid bruits.    Cardiovascular: Regular rate and rhythm, no m/r/g  Extremities: No clubbing, no cyanosis, no edema       Data:   ROUTINE IP LABS   CBC RESULTS:     Recent Labs   Lab 08/06/20  0745 08/05/20  1258 08/05/20  0655  08/04/20 1942   WBC  --   --  12.4*  --  16.3*   RBC  --   --  3.50*  --  4.24*   HGB 9.3* 9.2* 10.0*   < > 11.9*   HCT  --   --  30.8*  --  37.7*   PLT  --   --  242  --  302    < > = values in this interval not displayed.     Basic Metabolic Panel:   Recent Labs   Lab Test 08/05/20  0655 08/04/20 1942 07/17/20    135 134*   POTASSIUM 4.9 4.3 4.0   CHLORIDE 107 102 100   CO2 26 26 27   BUN 20 22 12   CR 0.76 0.89 0.53*   * 176* 152*   ALLISON 8.2* 9.1 8.8     Liver panel:  Recent Labs   Lab Test 08/05/20  0655 08/04/20  1942 07/04/20  0403 06/01/20  0336 05/01/20  1005   PROTTOTAL 5.7* 6.7* 6.5* 6.8 6.5*   ALBUMIN 2.8* 3.4 3.3* 3.3* 3.3*   BILITOTAL 0.3 0.4 0.6 0.6 0.9   ALKPHOS 70 81 95 125 97   AST 12 21 25 36 15   ALT 22 35 22 28 18     INR:  Recent Labs   Lab Test 07/04/20  0403 05/01/20  1005 04/18/20  1944 03/05/20  1411 08/24/19  1825   INR 1.02 1.03 1.08 1.07 0.94      Lipid Profile:  Recent Labs   Lab Test 07/05/20  0845 03/06/20  0014 08/25/19  0620 04/09/19  0626 02/28/19  1913 12/31/17  1425  12/18/15 12/05/14 11/22/13   CHOL 97 146 118 134  --  133   < > 198 160 151   HDL 51 52 43 47  --  43   < > 39* 48 41   LDL 26 69 49 64 87 61   < > 123 82 71   TRIG 99 123 130 113  --  147   < > 181* 149 197*   CHOLHDLRATIO  --   --   --   --   --   --   --  5.1* 3.3 3.7    < > = values in this interval not displayed.     Thyroid Panel:  Recent Labs   Lab Test 08/06/20  0745 06/03/18  0520 12/31/17  1425 01/20/17  1030 10/20/16  1825   TSH 1.12 4.45* 2.16 3.12 4.63*   T4  --  0.98  --   --  0.94      Vitamin B12:   Recent Labs   Lab Test 06/03/18  0520 01/20/17  1030   B12 230 298        A1C:   Recent Labs   Lab Test 06/01/20  1022 03/05/20  1159  09/23/19  1512 07/01/19  0607 05/30/19  1854   A1C 6.3* 8.3* 7.6* 8.3* 8.8*     Troponin I:   Recent Labs   Lab Test 06/11/20  0144 05/01/20  1005 04/19/20  0720 04/19/20  0429 04/19/20  0002 03/06/20  0714 03/06/20  0014 03/05/20  1610   TROPI 0.038 0.016 0.034 0.035 0.042 0.055* 0.034 0.040     CRP inflammation:   Recent Labs   Lab Test 05/01/20  1005 12/31/17  1425 06/09/17  0535 06/08/17  0613 06/07/17  0640   CRP 6.8 <2.9 89.0* 93.4* 107.0*     ESR:   Recent Labs   Lab Test 05/31/17  1800   SED 43*     Keppra (Levetiracetam) Level  12 - 46 ug/mL  48High             IMAGING:   All imaging studies were reviewed personally  MRI BRAIN WITHOUT AND WITH CONTRAST July 5, 2020 10:47 AM      HISTORY: Left-sided weakness.      TECHNIQUE: Multiplanar, multisequence MRI of the brain without and  with contrast.      COMPARISON: CT head 7/4/2020. MRI head 6/3/2020.     FINDINGS: No abnormal intracranial restricted diffusion to suggest  acute infarct. There is an unchanged moderate sized chronic left  middle cerebral artery territory infarct primarily involving the left  frontal lobe/frontal opercular region and left anterior insula.  Unchanged multiple small chronic right cerebellar hemisphere infarcts.  Moderate generalized brain parenchymal volume loss. Diffuse moderate  to severe confluent areas of T2/T2 FLAIR hyperintense signal in the  subcortical/deep and periventricular cerebral white matter and  extending along the bilateral basal nuclei regions and within the  wade, likely related to advanced chronic small vessel ischemic  disease. No acute intracranial hemorrhage, extraaxial fluid  collection, mass lesion or mass effect. The ventricles are normal in  size and configuration. No abnormal intracranial postcontrast  enhancement is evident.     Bilateral lens replacements. Visualized orbits are otherwise normal.  Mild mucosal thickening in the bilateral ethmoid air cells. The left  internal carotid artery flow-void is  absent, as before. The other  major vascular flow voids at the skull base appear grossly maintained.  The calvarium, skull base and midface otherwise appear grossly  unremarkable. Unchanged osseous fusion across the posterior aspect of  the C2 and C3 vertebral bodies, and also involving the C2-C3 posterior  elements bilaterally.                                                                      IMPRESSION:  1. No acute intracranial abnormality.  2. Unchanged moderate-sized chronic left fronto-insular ischemic  infarct and small right cerebellar hemisphere infarct.  3. Moderate global brain parenchymal volume loss and moderate to  severe presumed chronic small vessel ischemic disease, as before.  4. Unchanged chronic left internal carotid artery occlusion  CT SCAN OF THE HEAD WITHOUT CONTRAST   8/4/2020 8:56 PM      HISTORY: Patient fell.     TECHNIQUE:  Axial images of the head and coronal reformations without  IV contrast material. Radiation dose for this scan was reduced using  automated exposure control, adjustment of the mA and/or kV according  to patient size, or iterative reconstruction technique.     COMPARISON: MR scan dated 7/5/2020     FINDINGS:      Intracranial contents: There is diffuse parenchymal volume loss.   White matter changes are present in the cerebral hemispheres that are  consistent with small vessel ischemic disease in this age patient.  There is a chronic area of encephalomalacia in the left frontal lobe.  Small areas of encephalomalacia are also seen in the cerebellum There  is no evidence of intracranial hemorrhage, mass, acute infarct or  anomaly.     Visualized orbits/sinuses/mastoids:  The visualized portions of the  sinuses and mastoids appear normal.     Osseous structures/soft tissues:  There is no evidence of trauma. No  intracranial hemorrhage or skull fractures are identified.                                                                      IMPRESSION: No intracranial bleed  or skull fractures  EXAM: CTA  HEAD NECK WITH CONTRAST  LOCATION: [facility]  DATE/TIME: 7/4/2020 5:55 AM     INDICATION: Transient left-sided weakness.  COMPARISON: None.  CONTRAST: 70mL [id number]  TECHNIQUE: Head and neck CT angiogram with IV contrast. Axial helical CT images of the head and neck vessels obtained during the arterial phase of intravenous contrast administration. Axial 2D reconstructed images and multiplanar 3D MIP reconstructed   images of the head and neck vessels were performed by the technologist. Dose reduction techniques were used. All stenosis measurements made according to NASCET criteria unless otherwise specified.     FINDINGS:   HEAD CTA:  ANTERIOR CIRCULATION: No stenosis/occlusion, aneurysm, or high flow vascular malformation. Standard [place] anatomy.     POSTERIOR CIRCULATION:  Left P2 segment severe stenosis with poststenotic dilatation measuring 2 mm.     Right P2 segment mild stenosis.     Otherwise, no significant stenosis/occlusion, aneurysm, or high flow vascular malformation. Dominant right and smaller left vertebral artery contribute to a normal basilar artery.      DURAL VENOUS SINUSES: Not well evaluated on a technical basis.        NECK CTA:  RIGHT CAROTID:   Right carotid endarterectomy likely present.     Right distal cervical ICA is tortuous.     Right distal cervical ICA just inferior to the skull base demonstrate 50% stenosis based upon NASCET criteria.     Otherwise, no measurable stenosis or dissection.     LEFT CAROTID:   Left common carotid artery origin moderate to severe stenosis.     Left mid and distal common carotid artery demonstrate moderate stenosis.     Left carotid bifurcation moderate stenosis.     Left carotid bulb, left cervical ICA, and left ICA at the skull base is occluded with reconstitution at the left distal supraclinoid ICA and left carotid terminus.        VERTEBRAL ARTERIES:  Right mid to distal V2 segment moderate  stenosis likely due to extrinsic compression from spondylosis.      Otherwise, no significant stenosis/occlusion or dissection. Dominant right and smaller left vertebral arteries.     AORTIC ARCH: Classic aortic arch anatomy. Left mid subclavian artery severe stenosis. Right proximal brachiocephalic artery mild stenosis unlikely be rate limiting.     ARTERIAL PLAQUE: Extensive calcified/noncalcified plaque with multifocal small and prominent ulcerations noted within the aorta, left subclavian artery, right brachiocephalic artery, right subclavian artery, bilateral extracranial carotid arteries   (including but not limited to the carotid bifurcations and carotid bulbs), bilateral ICAs at the skull base, bilateral V4 segments.     NONVASCULAR STRUCTURES:   Dental amalgam resulting in streak artifact.      Emphysema.     Diffuse bony demineralization.     Degenerative changes noted within the spine.                                                                         IMPRESSION:   HEAD CTA:   1.  Left P2 segment severe stenosis with poststenotic dilatation measuring 2 mm.  2.  Right P2 segment mild stenosis.  3.  Remaining proximal intracranial arteries demonstrate no significant stenosis or occlusion.         NECK CTA:  1.  Left carotid bulb, left cervical ICA, and left ICA at the skull base is occluded with reconstitution at the left distal supraclinoid ICA and left carotid terminus.  2.  Left common carotid artery origin moderate to severe stenosis.  3.  Left mid and distal common carotid artery demonstrate moderate stenosis.  4.  Left carotid bifurcation moderate stenosis.  5.  Right distal cervical ICA just inferior to the skull base demonstrate 50% stenosis based upon NASCET criteria.  6.  Right mid to distal V2 segment moderate stenosis likely due to extrinsic compression from spondylosis    EEG 6/20Impression:      Abnormal EEG with generalized suppression with intermittent frontal delta slowing on the  left suggests mild encephalopathy with superimposed structural lesion on the left (prior stroke) in the appropriate clinical context.      No epileptic form activity or seizure event during the study.           Mireille Dumont M.D.  [facility] Neurology, [facility].  Office [phone]  Pager [phone]  Direct line Office [phone]  (answers 8--5pm M-F)

## 2020-08-06 NOTE — PLAN OF CARE
Alert to self only. VSS.  Appears to be in pain with activity, on scheduled tylenol.  Up with chair with PT, needed lift to get back to bed.  Total feed, ate 75% of breakfast, DD1 with puree.  Suppository given for large amt of stool seen on CT.  Medium results x2.  Blood sugars stable.  Does well with meds crushed in pudding.  LE compression socks in place.  Pulled out PIV and pulls at ruiz at times.  Scabs, bruising and skin tears throughout body.  Cont to monitor.   Neuro consult pending.

## 2020-08-06 NOTE — PLAN OF CARE
DATE & TIME: 8/5/2020 4430-1716    Cognitive Concerns/ Orientation : oriented to self only. Knows he is in the hospital but not which one.   BEHAVIOR & AGGRESSION TOOL COLOR: green  ABNL VS/O2: VSS on RA  MOBILITY: total assist, refuses to get up. T/R q2h  PAIN MANAGMENT: scheduled tylenol, pt appears comfortable & denies pain  DIET: Dd1/nectar liquids. Pt coughing often with PO intake, encouraged small bites, upright position, and chin-tuck. Pt unable to follow chin-tuck directions  BOWEL/BLADDER: PTA ruiz patent, no BM this shift  ABNL LAB/BG: /162  DRAIN/DEVICES: ruiz, IV infusing IVF 100mL/hr  D/C DAY/GOALS/PLACE: back to [facility] once medically stable

## 2020-08-06 NOTE — PLAN OF CARE
Discharge Planner OT   Patient plan for discharge: not obtained  Current status: OT orders received, chart reviewed on 89yo male admitted following fall at his care facility. Noted PT notes that patient requiring lift assist of 2 people, he is confused and tolerating minimal activity. Plan is for TCU at discharge. Therefore, skilled OT intervention will be deferred to next level of care when patient may be better able to participate in out of bed ADL activities and inpatient PT will continue to follow during hospitalization for strengthening and to advance mobility as able. OT to sign off and complete order.   Barriers to return to prior living situation: current level of A  Recommendations for discharge: TCU has been recommended by PT  Rationale for recommendations: see PT note       Entered by: Markus Franco 08/06/2020 11:18 AM

## 2020-08-06 NOTE — PROVIDER NOTIFICATION
MD Notification    Notified Person: MD    Notified Person Name:    Notification Date/Time: Ovian    Notification Interaction:text paged    Purpose of Notification:Phos 2.3, please advise.     Orders Received:    Comments:

## 2020-08-06 NOTE — PROGRESS NOTES
08/06/20 0850   Quick Adds   Type of Visit Initial PT Evaluation   Living Environment   Living Environment Comment Pt speaking minimally, does not respond to PT questions regarding home environment. Appears confused.    Self-Care   Activity/Exercise/Self-Care Comment Pt speaking minimally, does not respond to PT questions regarding home environment. Appears confused.    Functional Level Prior   Fall history within last six months yes   Number of times patient has fallen within last six months 1   Prior Functional Level Comment From discussion with RN, pt appears to have been mobilizing in home environment [facility].    General Information   Onset of Illness/Injury or Date of Surgery - Date 08/04/20   Referring Physician Segura, Germain Lindsay MD    Pertinent History of Current Problem (include personal factors and/or comorbidities that impact the POC) Dustin Pearce is a 89 year old male admitted on 8/4/2020. He presents after an unwitnessed fall at his care facility, concerned that he may have hit his head in the setting of mental slowing and anisocoria.   Precautions/Limitations fall precautions   Weight-Bearing Status - RLE weight-bearing as tolerated   General Observations Pt seated at EOB. RN presents   General Info Comments Pt confused; Eval & tx fo fall/pelvic fx. Up with assist WBAT R LE   Cognitive Status Examination   Orientation person   Level of Consciousness alert;confused   Follows Commands and Answers Questions 25% of the time;able to follow single-step instructions   Personal Safety and Judgment impaired   Memory impaired   Cognitive Comment Appears confused; not following simple commands   Pain Assessment   Patient Currently in Pain Yes, see Vital Sign flowsheet   Posture    Posture Forward head position;Protracted shoulders   Range of Motion (ROM)   ROM Comment NT   Strength   Strength Comments NT   Bed Mobility   Bed Mobility Comments NT; pt up at EOB at session start   Transfer Skills   Transfer  "Comments sit<>stand max A with bed height raised x2 and FWW. Parial staind ~50%   Gait   Gait Comments NT; unsafe at thsi time    Balance   Balance Comments R lateral lean at EOB; requires modA to maintain upright with fatigue   General Therapy Interventions   Planned Therapy Interventions balance training;bed mobility training;gait training;neuromuscular re-education;strengthening;transfer training;risk factor education;home program guidelines;progressive activity/exercise   Clinical Impression   Criteria for Skilled Therapeutic Intervention yes, treatment indicated   PT Diagnosis fall risk, weakness   Influenced by the following impairments decreased balance, decreased strength, cognitive deficits    Functional limitations due to impairments difficulty walking/transferring   Clinical Presentation Stable/Uncomplicated   Clinical Presentation Rationale clinical judgment   Clinical Decision Making (Complexity) Low complexity   Therapy Frequency 5x/week   Predicted Duration of Therapy Intervention (days/wks) 3 days   Anticipated Discharge Disposition Transitional Care Facility   Risk & Benefits of therapy have been explained Yes   Patient, Family & other staff in agreement with plan of care Yes   [facility] AM-PAC  \"6 Clicks\" V.2 Basic Mobility Inpatient Short Form   1. Turning from your back to your side while in a flat bed without using bedrails? 3 - A Little   2. Moving from lying on your back to sitting on the side of a flat bed without using bedrails? 2 - A Lot   3. Moving to and from a bed to a chair (including a wheelchair)? 1 - Total   4. Standing up from a chair using your arms (e.g., wheelchair, or bedside chair)? 2 - A Lot   5. To walk in hospital room? 1 - Total   6. Climbing 3-5 steps with a railing? 1 - Total   Basic Mobility Raw Score (Score out of 24.Lower scores equate to lower levels of function) 10   Total Evaluation Time   Total Evaluation Time (Minutes) 9     "

## 2020-08-06 NOTE — PLAN OF CARE
Alert to self only. VSS. Denies pain. T/R q2h. DD1, nectar thick diet. Luis patent w/ adequate UOP. R PIV infusing NS K20 @ 100 mL/hr. Hgb 10 to 9.2. Discharge pending Hgb stabilization.

## 2020-08-06 NOTE — PLAN OF CARE
Discharge Planner PT    Dustin Pearce is a 89 year old male admitted on 8/4/2020. He presents after an unwitnessed fall at his care facility, concerned that he may have hit his head in the setting of mental slowing and anisocoria.    Unclear baseline functional mobility; from chart review, pt lives in a facility but unclear when assist he has if any and if he was mobilizing. Pt confused at visit, with minimal verbalization to PT despite asking questions. Follows ~25% of simple commands.     Patient plan for discharge: Did not discuss  Current status: PT evaluation completed & tx initiated. Pt at EOB at session start. Transfers sit<>stand 4x, with maxA x2 with ~50% partial stand at most from raised bed height. Utilizing Lesa steady to transfer to chair. Discussing with RN to utilize lift Ax2 .  Barriers to return to prior living situation: Poor command following, confusion, level of assist, Ax2, falls risk, weakness  Recommendations for discharge: TCU  Rationale for recommendations: Anticipate pt is well below baseline from chart review; appears he was able to mobilize [place]. Pt now with WBAT R LE and FWW, unable to stand.  Will require further skilled PT intervention to progress strength, balance and activity tolerance for return to PLOF.        Entered by: Tran Cartagena 08/06/2020 10:31 AM

## 2020-08-06 NOTE — PROGRESS NOTES
[facility]    Hospitalist Progress Note    Date of Admission:  8/4/2020    Assessment & Plan     Dsutin Pearce is a 89 year old male admitted on 8/4/2020. He presents after an unwitnessed fall at his care facility, concerned that he may have hit his head in the setting of mental slowing and anisocoria.     Catheter related urinary tract infection:  Generalized weakness most likely due to UTI  Hypophosphatemia   Abnormal urinalysis with hematuria, pyuria.  Patient with decreased mental acuity/mental slowing in the setting of dementia .  Catheter was exchanged in the emergency department prior to specimen being obtained  -Continue ceftriaxone 1 g every 24 hours  -urine culture pending  -Given history of renal stones, hematuria, left CVA tenderness with percussion, creatinine elevation from baseline, obtained noncontrast CT of abdomen and pelvis to rule out obstructive uropathy.  No hydronephrosis or perinephric stranding noted.  -Leukocytosis has improved.  -TSH normal.  Phosphorus low at 2.3.  Replace per protocol.    Acute encephalopathy, possibly infectious in the setting of urinary tract infection: Difficult to determine degree of contribution given known underlying dementia.  -Fall precautions  -Keppra level slightly high at 48, lacosamide levels pending from the emergency department.  -Given slightly elevated Keppra level, continued encephalopathy will consult neurology for recommendations regarding antiepileptics,?  Other causes of encephalopathy.     Frequent falls: Essentially monthly hospitalizations for falls with various orthopedic injuries including left shoulder injury in June, July.  Significant prior work-up including ZIO patch monitoring without significant arrhythmias, recent TTE with mild to moderate aortic stenosis, multiple prior EEGs without seizure activity  Occult pelvic fractures: Incidentally noted on CT abdomen pelvis.  Acute mildly displaced fracture of right pubic bone  with suspected surrounding blood products, acute nondisplaced vertical right iliac wing fracture.  Patient with no complaints of pain, though history limited by dementia.  -Orthopedic surgery consulted for trauma evaluation  -They have recommended nonoperative management with WBAT with walker. Follow-up with Ward at [facility] in 6 weeks for recheck.  Call [phone] for appointment.  -Hemoglobin at 9.3 [11.9 at admission].  Continue to monitor  -PT/OT  -Fall precautions  -Compression stockings to bilateral lower extremities given prior concern for orthostasis as contributing factor to falls     Acute on chronic anemia, possibly secondary to blood loss with pelvic fractures:  Patient presents with a hemoglobin of 11.9.  In the setting of pelvic fractures with possible intrapelvic blood loss, trending hemoglobin  -Hemoglobin trended down to 9.3.  Continue to monitor.  -Aspirin and Brilinta currently on hold given risk of pelvic bleed associated with occult fractures.   If worsening hemoglobin and concern for intra-abdominal bleed, may require interventional radiology embolization.  Low suspicion for this currently, though would monitor closely given the patient's dementia and potential inability to make needs known       Dysphagia  -Seen by speech pathology.  Has been placed on DD 1 diet with nectar thickened liquids.     History of seizure disorder: Follows with neurology, multiple fairly recent seizure evaluations without capture on EEG  -Continue Keppra 750 mg twice daily  -Continue prior to admission lacosamide   -Keppra level slightly elevated at 48 and lacosamide levels pending from ER  -Consult to neurology as above.     Peripheral arterial disease:  Coronary artery disease with associated ischemic cardiomyopathy: Most recent ejection fraction April 2020 with 30 to 35%, moderate aortic stenosis  History of CVA:  Carotid artery stenosis: Status post right carotid enterectomy  Nonsustained ventricular  tachycardia: Max 17 beat run on recent ZIO patch monitoring.  No symptoms at that time, though cannot rule out more persistent V. tach episodes resulting in falls.  Would not necessarily explain encephalopathy currently noted.  Paroxysmal atrial fibrillation: Possible.  Not noted on recent ZIO patch monitoring  -Prior to admission aspirin and Brilinta on hold given intra-abdominal pelvic bleed associated with occult fractures  -Continue prior to admission atorvastatin  -Remains off of beta-blockade following recent cardiology ZIO patch monitoring and concern for orthostatic falls.     Type 2 diabetes: Last hemoglobin A1c 6.3 on 6/1/2020.  -Medium dose sliding scale insulin 4 times daily with meals and at bedtime        Diet:  DD 1 with nectar thickened liquids  DVT Prophylaxis: Pneumatic compression devices  Luis Catheter: in place, indication:  Chronic urinary retention  Code Status: Full code       Goals of care conversation on 8/5: I discussed recurrent recent hospitalizations, general decline over the last few months and questioned the appropriateness of Dustin continuing to remain full code.  Discussed poor chances of meaningful survival after a CPR event given Dustin's overall frail condition and underlying dementia.  His wife Halina, insisted on Dustin remaining full code at this time and stated that she understood our concerns.  She would like to talk with his son first prior to making any changes.     Disposition Plan     Expected discharge:  2-3 days back to care facility, recommended to prior living arrangement once  hemoglobin stable, antibiotic plan established, encephalopathy improved      Total time spent in patient encounter: 35 minutes  Greater than 50% time spent in family counseling and care coordination [discussing goals of care].    Isaura Jenkins MD  Text Page (7am - 6pm, M-F)    Interval History   Patient has remained stable overnight.  No fevers.  This morning he is laying in bed.  Is awake but  does not respond to any of my questions.  Appears very sluggish.  Per RN, he was awake for last few hours and perhaps he is more tired now.  Does not seem to be in any distress.  ROS not possible given his confusion/encephalopathy.    -Data reviewed today: I reviewed all new labs and imaging results over the last 24 hours. I personally reviewed CT scan with result as noted above    Physical Exam   Temp: 98.5  F (36.9  C) Temp src: Oral BP: 124/52   Heart Rate: 99 Resp: 16 SpO2: 91 % O2 Device: None (Room air)    Vitals:    08/04/20 2328 08/05/20 0200 08/06/20 0706   Weight: 56.1 kg (123 lb 11.2 oz) 56.1 kg (123 lb 11.2 oz) 63.2 kg (139 lb 6.4 oz)     Vital Signs with Ranges  Temp:  [95.8  F (35.4  C)-98.5  F (36.9  C)] 98.5  F (36.9  C)  Heart Rate:  [91-99] 99  Resp:  [16] 16  BP: (124-126)/(50-54) 124/52  SpO2:  [91 %-93 %] 91 %  I/O last 3 completed shifts:  In: 2249 [I.V.:2249]  Out: 850 [Urine:850]    Constitutional: Awake though quite lethargic, appears comfortable, in no acute distress, frail elderly male laying in bed  Respiratory: Non labored breathing, clear to auscultation bilaterally  Cardiovascular: Heart sounds regular rate and rhythm, no murmurs, no leg edema  GI: Abdomen is soft, non distended, non tender. Normal BS, Luis catheter noted  Skin/Integumen: no rashes, scabs on both knees  Neuro: alert, confused, no facial asymmetry  Psych: Calm    Medications     0.9% sodium chloride + KCl 20 mEq/L 50 mL/hr at 08/06/20 0833       acetaminophen  1,000 mg Oral TID     atorvastatin  20 mg Oral At Bedtime     cefTRIAXone  1 g Intravenous Q24H     DULoxetine  30 mg Oral Daily     insulin aspart  1-6 Units Subcutaneous 4x Daily w/meals     Lacosamide  100 mg Oral BID     levETIRAcetam  750 mg Oral BID     magnesium oxide  400 mg Oral 4x Daily w/meals     omeprazole  40 mg Oral QAM     polyethylene glycol-propylene glycol  2 drop Both Eyes QPM     senna-docusate  1 tablet Oral BID    Or     senna-docusate  2  tablet Oral BID     sodium chloride (PF)  3 mL Intracatheter Q8H       Data   Recent Labs   Lab 08/06/20  0745 08/05/20  1258 08/05/20  0655  08/04/20  1942   WBC  --   --  12.4*  --  16.3*   HGB 9.3* 9.2* 10.0*   < > 11.9*   MCV  --   --  88  --  89   PLT  --   --  242  --  302   NA  --   --  137  --  135   POTASSIUM  --   --  4.9  --  4.3   CHLORIDE  --   --  107  --  102   CO2  --   --  26  --  26   BUN  --   --  20  --  22   CR  --   --  0.76  --  0.89   ANIONGAP  --   --  4  --  7   ALLISON  --   --  8.2*  --  9.1   GLC  --   --  160*  --  176*   ALBUMIN  --   --  2.8*  --  3.4   PROTTOTAL  --   --  5.7*  --  6.7*   BILITOTAL  --   --  0.3  --  0.4   ALKPHOS  --   --  70  --  81   ALT  --   --  22  --  35   AST  --   --  12  --  21    < > = values in this interval not displayed.       Imaging  No results found for this or any previous visit (from the past 24 hour(s)).

## 2020-08-06 NOTE — PLAN OF CARE
Discharge Planner SLP   Patient plan for discharge: Not addressed.   Current status: Patient seen for swallow treatment while up in the chair. He continues to be confused. He was given trials of nectar thick liquids by the spoon with continued delayed swallow response, but no coughing. Trials of nectar thick liquids via the cup there was delayed coughing and penetration or silent aspiration can not be ruled out. He was able to tolerate 1/2 teaspoon size of pudding given time to swallow x2. Suspect some pharyngeal retention. Increased confusion and fatigue impacting swallow function.  Recommend: 1. Cautiously continue on the DDL 1 with nectar thick liquids by spoon only. 2. Up in a chair for meals, needs 1:1 assistance with feeding, 1/2 teaspoon amounts of pureed textures and encourage to complete a dry swallow. Alternate liquids/solids and verify each swallow before the next bite. If coughing continue with nectar thick liquids by spoon change to honey thick.   Barriers to return to prior living situation: None  Recommendations for discharge: Return to [facility].   Rationale for recommendations: Continue ST needs for dysphagia management for diet tolerance and advancement if indicated.        Entered by: Chloe Mclain 08/06/2020 10:08 AM

## 2020-08-07 NOTE — PLAN OF CARE
Alert to self. VSS ex HTN. Patient asking for pain meds when repositioning, facial grimacing and grabbing at R hip. Heat and tylenol given X1 with relief. Lift, T/R Q2 hrs. Luis in place, incontinent of stool. No BM this shift. DD1 w/ nectar thick, total feed; poor appetite. Crush meds in pudding. R PIV infusing NS+K @ 50 mL/hr. Bilateral scabs to both knees. CT completed today, see notes. Discussed code status again with wife Halina, see Hosp note. Monitor over the weekend and send back to TCU once stable.

## 2020-08-07 NOTE — PROGRESS NOTES
[facility]    Hospitalist Progress Note    Date of Admission:  8/4/2020    Assessment & Plan     Dustin Pearce is a 89 year old male admitted on 8/4/2020. He presents after an unwitnessed fall at his care facility, concerned that he may have hit his head in the setting of mental slowing and anisocoria.     Catheter related urinary tract infection:  Generalized weakness most likely due to UTI  Hypophosphatemia   Abnormal urinalysis with hematuria, pyuria.  Patient with decreased mental acuity/mental slowing in the setting of dementia .  Catheter was exchanged in the emergency department prior to specimen being obtained  -Continue ceftriaxone 1 g every 24 hours.  Can switch to cefuroxime to complete a course at discharge.  -urine culture with 2 strains of E. coli, low colony counts, susceptibilities pending.  -Given history of renal stones, hematuria, left CVA tenderness with percussion, creatinine elevation from baseline, obtained noncontrast CT of abdomen and pelvis to rule out obstructive uropathy.  No hydronephrosis or perinephric stranding noted.  -Leukocytosis has improved.  -TSH normal.  Phosphorus low.  Replace per protocol.    Acute toxic metabolic encephalopathy, likely related to catheter associated UTI  Difficult to determine degree of contribution given known underlying dementia.  -Fall precautions  -Keppra level slightly high at 48, lacosamide levels pending from the emergency department.  -Given slightly elevated Keppra level, continued encephalopathy consulted neurology for recommendations regarding antiepileptics,?  Other causes of encephalopathy.  -Keppra level was decreased to 500 mg a.m. and 750 mg p.m. by neurology.  -Lacosamide level returned slightly elevated at 10.3.  Will have neurology weigh in regarding lacosamide dosing as well.     Frequent falls: Essentially monthly hospitalizations for falls with various orthopedic injuries including left shoulder injury in June, July.   Significant prior work-up including ZIO patch monitoring without significant arrhythmias, recent TTE with mild to moderate aortic stenosis, multiple prior EEGs without seizure activity  Occult pelvic fractures: Incidentally noted on CT abdomen pelvis.  Acute mildly displaced fracture of right pubic bone with suspected surrounding blood products, acute nondisplaced vertical right iliac wing fracture.  Patient with no complaints of pain, though history limited by dementia.  -Orthopedic surgery consulted for trauma evaluation  -They have recommended nonoperative management with WBAT with walker. Follow-up with [facility] at [facility] in 6 weeks for recheck.  Call [phone] for appointment.  -Hemoglobin at 9.3 [11.9 at admission].  Continue to monitor  -PT/OT  -Fall precautions  -Compression stockings to bilateral lower extremities given prior concern for orthostasis as contributing factor to falls     Acute on chronic anemia, possibly secondary to blood loss with pelvic fractures:  Patient presents with a hemoglobin of 11.9.  In the setting of pelvic fractures with possible intrapelvic blood loss, trending hemoglobin  -Hemoglobin trended down to 8.6.  Repeated pelvic CT on 8/7 which showed stable fracture and small hematoma with no significant change from prior CT.  Continue to monitor.  -Aspirin and Brilinta currently on hold given risk of pelvic bleed associated with occult fractures.   If worsening hemoglobin and concern for intra-abdominal bleed, may require interventional radiology embolization.  Low suspicion for this currently, though would monitor closely given the patient's dementia and potential inability to make needs known.  Would likely resume only aspirin and leave Brilinta on hold at discharge till hemoglobin stable.       Dysphagia  -Seen by speech pathology.  Has been placed on DD 1 diet with nectar thickened liquids.     History of seizure disorder: Follows with neurology, multiple fairly recent seizure  evaluations without capture on EEG  -Neurology consulted for encephalopathy and seizure disorder  -Keppra level slightly elevated at 48 and lacosamide levels also slightly elevated at 10.3.  Will have neurology address lacosamide dosing as well. -Keppra dose decreased by neurology as above.       Peripheral arterial disease:  Coronary artery disease with associated ischemic cardiomyopathy: Most recent ejection fraction April 2020 with 30 to 35%, moderate aortic stenosis  History of CVA:  Carotid artery stenosis: Status post right carotid enterectomy  Nonsustained ventricular tachycardia: Max 17 beat run on recent ZIO patch monitoring.  No symptoms at that time, though cannot rule out more persistent V. tach episodes resulting in falls.  Would not necessarily explain encephalopathy currently noted.  Paroxysmal atrial fibrillation: Possible.  Not noted on recent ZIO patch monitoring  -Prior to admission aspirin and Brilinta on hold given intra-abdominal pelvic bleed associated with occult fractures  -Continue prior to admission atorvastatin  -Remains off of beta-blockade following recent cardiology ZIO patch monitoring and concern for orthostatic falls.     Type 2 diabetes: Last hemoglobin A1c 6.3 on 6/1/2020.  -Medium dose sliding scale insulin 4 times daily with meals and at bedtime        Diet:  DD 1 with nectar thickened liquids  DVT Prophylaxis: Pneumatic compression devices  Luis Catheter: in place, indication:  Chronic urinary retention  Code Status: Full code       Goals of care conversation on 8/5: I discussed recurrent recent hospitalizations, general decline over the last few months and questioned the appropriateness of Dustin continuing to remain full code.  Discussed poor chances of meaningful survival after a CPR event given Dustin's overall frail condition and underlying dementia.  His wife Halina, insisted on Dustin remaining full code at this time and stated that she understood our concerns.  She would  like to talk with his son first prior to making any changes.    8/7: Patient remains quite  lethargic with inconsistent oral intake.  Try to call Halina again today, went to [facility].     Disposition Plan     Addendum: I did talk with Halina on the phone later in the day.  I did express my concerns regarding labs overall decline and readdressed CODE STATUS.  He again would like to discuss more with Dustin's son prior to making any changes to his CODE STATUS.  Anticipate observing Dustin over the weekend, would like to see better oral intake prior to discharge.  Anticipate discharge back to TCU on Monday if hemoglobin remains stable and he shows some better oral intake.    Isaura Jenkins MD  Text Page (7am - 6pm, M-F)    Interval History   Patient has remained stable overnight.  No fevers.  This morning he is laying in bed.  Was very drowsy.  Per RN, he was awake earlier.  Oral intake has been very inconsistent.  Does seem to be in pain with movement/transfers per RN.  But hard to get any history from patient.  -Data reviewed today: I reviewed all new labs and imaging results over the last 24 hours. I personally reviewed CT scan with result as noted above    Physical Exam   Temp: 98.8  F (37.1  C) Temp src: Axillary BP: 130/72 Pulse: 98 Heart Rate: 95 Resp: 18 SpO2: 92 % O2 Device: None (Room air)    Vitals:    08/05/20 0200 08/06/20 0706 08/07/20 0617   Weight: 56.1 kg (123 lb 11.2 oz) 63.2 kg (139 lb 6.4 oz) 60.3 kg (132 lb 14.4 oz)     Vital Signs with Ranges  Temp:  [97.9  F (36.6  C)-98.8  F (37.1  C)] 98.8  F (37.1  C)  Pulse:  [93-98] 98  Heart Rate:  [95] 95  Resp:  [15-18] 18  BP: (124-136)/(65-72) 130/72  SpO2:  [92 %] 92 %  I/O last 3 completed shifts:  In: 240 [P.O.:240]  Out: 425 [Urine:425]    Constitutional: Awake though quite lethargic, appears comfortable, in no acute distress, frail elderly male laying in bed  Respiratory: Non labored breathing, clear to auscultation bilaterally  Cardiovascular: Heart  sounds regular rate and rhythm, no murmurs, no leg edema  GI: Abdomen is soft, non distended, non tender. Normal BS, Luis catheter noted  Skin/Integumen: no rashes, scabs on both knees  Neuro: alert, confused, no facial asymmetry  Psych: Calm    Medications     0.9% sodium chloride + KCl 20 mEq/L 50 mL/hr at 08/07/20 0913       acetaminophen  1,000 mg Oral TID     atorvastatin  20 mg Oral At Bedtime     cefTRIAXone  1 g Intravenous Q24H     DULoxetine  30 mg Oral Daily     insulin aspart  1-6 Units Subcutaneous 4x Daily w/meals     Lacosamide  100 mg Oral BID     levETIRAcetam  500 mg Oral Daily     levETIRAcetam  750 mg Oral At Bedtime     magnesium oxide  400 mg Oral 4x Daily w/meals     omeprazole  40 mg Oral QAM     polyethylene glycol-propylene glycol  2 drop Both Eyes QPM     senna-docusate  1 tablet Oral BID    Or     senna-docusate  2 tablet Oral BID     sodium chloride (PF)  3 mL Intracatheter Q8H       Data   Recent Labs   Lab 08/07/20  0704 08/06/20  0745 08/05/20  1258 08/05/20  0655  08/04/20  1942   WBC  --   --   --  12.4*  --  16.3*   HGB 8.6* 9.3* 9.2* 10.0*   < > 11.9*   MCV  --   --   --  88  --  89   PLT  --   --   --  242  --  302     --   --  137  --  135   POTASSIUM 4.5  --   --  4.9  --  4.3   CHLORIDE 110*  --   --  107  --  102   CO2 25  --   --  26  --  26   BUN 13  --   --  20  --  22   CR 0.67  --   --  0.76  --  0.89   ANIONGAP 4  --   --  4  --  7   ALLISON 7.7*  --   --  8.2*  --  9.1   *  --   --  160*  --  176*   ALBUMIN  --   --   --  2.8*  --  3.4   PROTTOTAL  --   --   --  5.7*  --  6.7*   BILITOTAL  --   --   --  0.3  --  0.4   ALKPHOS  --   --   --  70  --  81   ALT  --   --   --  22  --  35   AST  --   --   --  12  --  21    < > = values in this interval not displayed.       Imaging  Recent Results (from the past 24 hour(s))   CT Pelvis Bone wo Contrast    Narrative    CT PELVIS BONE WITHOUT CONTRAST  8/7/2020 8:43 AM     HISTORY: Pelvic fracture, follow up; dropping  hemoglobin, pelvic  fracture.    TECHNIQUE:  Axial images with reconstructions. No IV contrast.  Radiation dose for this scan was reduced using automated exposure  control, adjustment of the mA and/or kV according to patient size, or  iterative reconstruction technique.    COMPARISON:  8/4/2020 vertical fracture.    FINDINGS:  Along the medial aspect of the right ilium; this is similar  to the previous exam. Again there is a comminuted fracture of the  right pubic symphysis and adjacent superior pubic ramus with some  displacement. Lumbar degenerative change and lumbosacral spondylolytic  spondylolisthesis. Chondrocalcinosis of the hips and symphysis pubis.  Minimal aneurysmal dilatation of the abdominal aorta with a transverse  diameter of 3 cm; this is incompletely imaged. Right common iliac  artery ectasia with a diameter of 1.7 cm. Colonic diverticulosis.  Bladder catheter with air/gas in the bladder. Right sacral fracture  which in retrospect was also present previously. Presacral edema.  Nonspecific subcutaneous edema. At the superior aspect of the right  pubic fracture, there is a 1.7 cm focal density on series 3 image 66  which presumably represents a small hematoma; this previously measured  1.5 cm.      Impression    IMPRESSION:  1. No significant change with respect to right iliac, sacral, and  pubic fractures. Small hematoma adjacent to the pubic symphysis  fracture.  2. Additional findings discussed above.

## 2020-08-07 NOTE — PLAN OF CARE
Discharge Planner SLP   Patient plan for discharge: Did not state  Current status: SLP: Pt denied breakfast this am, however, was agreeable to trials of coffee. Pain noted with sitting upright. Good timing and no overt s/sx of aspiration with nectar thick liquids by teaspoon and cup sips with pt feeding himself. Meds crushed in puree with throat clearing noted. Pt required cues for multiple swallows and liquids between bites. Pt not appropriate for diet upgrade.     Continue dysphagia diet level 1 and nectar thick liquids. 1:1 assist needed with intake to verify swallows, alternate solids and liquids and take double swallows as needed. Continue meds crushed in pudding with slow rate of intake.     Barriers to return to prior living situation: None  Recommendations for discharge: Return to TCU with SLP services  Rationale for recommendations: Pt likely near baseline with chronic dysphagia. ST to follow for caregiver education       Entered by: Janis Barton 08/07/2020 9:55 AM

## 2020-08-07 NOTE — CONSULTS
Consulted about lacosamide level Results for SID AGUIAR (MRN [id number]) as of 8/7/2020 15:25   Ref. Range 8/4/2020 19:42   Lacosamide Latest Ref Range: 5.0 - 10.0 ug/mL 10.3 (H)     As this level is only slightly above therapeutic range, recommend continuing same dose.   Follow up with Dr. Padilla clinic as out patient.     Please contact general neurology service if further concerns.  No charge.  Mireille Dumont M.D.  Neurologist  [facility]  Office [phone]  Direct Doctor Line [phone]  Pager [phone]

## 2020-08-07 NOTE — PLAN OF CARE
PT: Cancel; PT off floor in AM at time of PT attempt at CT. Upon checkback in later AM, pt involved with nursing cares.

## 2020-08-07 NOTE — PLAN OF CARE
A&Ox1. T/R, total care. Declined tylenol, appears comfortable at rest, pain increased with repositioning. Pt had episode of coughing with crushed meds in pudding. Other evening meds held after coughing episode. Phos replaced, recheck in AM. Mepilex placed on coccyx for blanchable redness. Small soft BM x1, incontinent. Luis patent with adequate output. Tolerating DD1 nectar diet but little to no appetite. Red rashy/abrasion(?) on the under side of scrotum. Scabbing and bruising throughout. BG WNL. Discharge pending. Compression stockings on BLE.

## 2020-08-07 NOTE — PLAN OF CARE
A&Ox1. VSS ex some hypertension. Patient reported no pain, no nonverbal signs of pain. Patient does however experience some pain with T&R.   Luis in place, patent. Patient DDI with nectar thick. Patient has multiple abrasions and scabs throughout self. . Discharge pending improvement.

## 2020-08-08 NOTE — PLAN OF CARE
Discharge Planner SLP   Patient plan for discharge: Not stated  Current status: Swallow treatment.  SLP approached patient, who had meal tray in room, RN stated okay for PO. Patient coughing when SLP entered, responsive to SLP statements, but with limited verbal response, appeared confused. 3 teaspoon trials of Nectar thick juice provided, coughing noted immediately following each teaspoon with multiple swallows palpated. puree not attempted due to poor response, and transport arrived to take patient to scheduled CT.     Recommend: Continue DDL1 and nectar thick liquids. 1:1 assist needed with intake to verify swallows, alternate solids and liquids and take double swallows as needed. Monitor for increased s/s of aspiration. Continue meds crushed in pudding with slow rate of intake.    Barriers to return to prior living situation: Dysphagia. Confusion  Recommendations for discharge: Return to TCU with SLP services  Rationale for recommendations: Pt likely near baseline with chronic dysphagia. ST to follow for caregiver education       Entered by: Portia Humphreys 08/08/2020 12:07 PM

## 2020-08-08 NOTE — PLAN OF CARE
Pt alert to self. Slow to respond. Slept between cares. Scheduled tylenol and heat for R hip pain. Luis w/ adequate output. No BM this shift. Dd1 w/ nectar thick liquids. Takes pills crushed in pudding. PIV w/ NS+K at 50 ml/hr. Turn/repo q2hr.

## 2020-08-08 NOTE — CONSULTS
"CLINICAL NUTRITION SERVICES  -  ASSESSMENT NOTE      Recommendations Ordered by Registered Dietitian (RD):     Will send a peach smoothie at breakfast (245 cals, 16 gm pro), a Magic Cup at lunch (290 cals, 9 gm pro), and a Gelatein PLUS at dinner (150 cals, 20 gm pro)     Malnutrition:   % Weight Loss:  Weight loss does not meet criteria for malnutrition - wt appears to fluctuate  % Intake:  </= 50% for >/= 5 days (severe malnutrition)  Subcutaneous Fat Loss: significant loss per MD notes (? Acute loss)  Muscle Loss: diffuse loss per MD note (? Acute loss)  Fluid Retention:  None noted    Malnutrition Diagnosis: at risk for malnutrition 2' advanced age and mental status         REASON FOR ASSESSMENT  Dustin Pearce is a 89 year old male seen by Registered Dietitian for Provider Order - at risk for malnutrition, assess for nutritional supplement       NUTRITION HISTORY  Unable - pt currently not available to visit      CURRENT NUTRITION ORDERS  Diet Order:     DD1, NTL  Room Service with Assist - \"send standard meal trays\"    Pt alert to self   Total feed   Intake has been ~25%    8/5: SLP bedside eval ---> moderate oral and pharyngeal dysphagia, rec DD1, NTL        NUTRITION FOCUSED PHYSICAL ASSESSMENT FOR DIAGNOSING MALNUTRITION)    No:  Patient not available                Observed:    Deferred     Obtained from Chart/Interdisciplinary Team:  Diffuse muscular wasting in all extremities which is fairly notable.    Fairly significant subcutaneous fat loss.    ANTHROPOMETRICS  Height: 5'7\"  Weight:(8/8) 65.2 kg 143 lbs 12.8 oz  Body mass index is 22.52 kg/m .  Weight Status:  Normal BMI  IBW: 67.3 kg  % IBW: 97%  Weight History: ? Accuracy of today's wt - pt up 20# past 4 days???  Wt Readings from Last 10 Encounters:   08/08/20 65.2 kg (143 lb 12.8 oz)   08/04/20 55.3 kg (122 lb)   07/27/20 56.3 kg (124 lb 1.6 oz)   07/20/20 55.7 kg (122 lb 12.8 oz)   07/08/20 58.9 kg (129 lb 12.8 oz)   07/05/20 57.1 kg (125 lb 14.4 " oz)   06/30/20 59 kg (130 lb)   06/23/20 58.7 kg (129 lb 4.8 oz)   06/18/20 58.2 kg (128 lb 3.2 oz)   06/15/20 59.4 kg (131 lb)     Vitals:    08/04/20 2328 08/05/20 0200 08/06/20 0706 08/07/20 0617   Weight: 56.1 kg (123 lb 11.2 oz) 56.1 kg (123 lb 11.2 oz) 63.2 kg (139 lb 6.4 oz) 60.3 kg (132 lb 14.4 oz)    08/08/20 0604   Weight: 65.2 kg (143 lb 12.8 oz)         LABS  Labs reviewed    MEDICATIONS  Medications reviewed      ASSESSED NUTRITION NEEDS PER APPROVED PRACTICE GUIDELINES:    Dosing Weight (8/4 - admit) 56.1 kg  Estimated Energy Needs: 0783-2148 kcals (30-35 Kcal/Kg)  Justification: underweight  Estimated Protein Needs: 67-84 grams protein (1.2-1.5 g pro/Kg)  Justification: Repletion  Estimated Fluid Needs: Per MD    MALNUTRITION:  % Weight Loss:  Weight loss does not meet criteria for malnutrition - wt appears to fluctuate  % Intake:  </= 50% for >/= 5 days (severe malnutrition)  Subcutaneous Fat Loss: significant loss per MD notes (? Acute loss)  Muscle Loss: diffuse loss per MD note (? Acute loss)  Fluid Retention:  None noted    Malnutrition Diagnosis: at risk for malnutrition 2' advanced age and mental status    NUTRITION DIAGNOSIS:  Inadequate oral intake related to suspected decrease in appetite as evidenced by chart notes intake of ~25%      NUTRITION INTERVENTIONS  Recommendations / Nutrition Prescription  DD1, NTL  Nutrition supplements  .      Implementation  Nutrition education: No education needs assessed at this time  Will send a peach smoothie at breakfast (245 cals, 16 gm pro), a Magic Cup at lunch (290 cals, 9 gm pro), and a Gelatein PLUS at dinner (150 cals, 20 gm pro)  .      Nutrition Goals  Pt to consume >50% meals and 100% supplements (685 cals, 45 gm pro)  .      MONITORING AND EVALUATION:  Progress towards goals will be monitored and evaluated per protocol and Practice Guidelines

## 2020-08-08 NOTE — PLAN OF CARE
Alert to self and place, slow to respond @ times. No visual signs of discomfort except for when repositioning, scheduled tylenol and heat with relief. Luis with cloudy, bárbara output. 2 BM's this shift, loose stool. Dd1 with nectar thick, total feed. Minimal intake, supplemental boosts now ordered. Holding insulin. Crush pills in pudding. R hip fracture, WBAT. Ax2/lift; T/R. Compression stockings to BLE. Head CT today, see results. Possible EEG tomorrow if no improvement in orientation. Possible transition to hospice within the next 1-2 days. Continue to monitor.

## 2020-08-08 NOTE — PLAN OF CARE
Alert to self only: slow to respond @ times. Denies pain, scheduled tylenol. Luis w/ adequate UOP. No BM this shift. DD1, nectar thick liquids. Pills crushed w/ pudding. L PIV infusing NS K20 @ 50 mL/hr. T/R q2h. Discharge pending Hgb stabilization & better oral intake.

## 2020-08-08 NOTE — PROGRESS NOTES
[facility]    Hospitalist Progress Note    Assessment & Plan   He presents after an unwitnessed fall at his care facility, concerned that he may have hit his head in the setting of mental slowing and anisocoria.     Catheter related urinary tract infection:  Generalized weakness most likely due to UTI  Hypophosphatemia   -Abnormal urinalysis with hematuria, pyuria.   - Patient with decreased mental acuity/mental slowing in the setting of dementia .  Catheter was exchanged in the emergency department prior to specimen being obtained  -Continue ceftriaxone 1 g every 24 hours.  Can switch to cefuroxime to complete a course at discharge.  -urine culture with 2 strains of E. coli, low colony counts, susceptible to Ceftriaxone. .  -Given history of renal stones, hematuria, left CVA tenderness with percussion, creatinine elevation from baseline, obtained noncontrast CT of abdomen and pelvis to rule out obstructive uropathy.  No hydronephrosis or perinephric stranding noted.    -Leukocytosis has improved.  -TSH normal.  Phosphorus low.  Replace per protocol.  -afebrile.   -nl lytes today  On ivfs    Plan:   - am wbc, cont Ceftriaxone (day 5 today)  -follow po intake, nutrition consult, daily labs            Acute toxic metabolic encephalopathy, likely related to catheter associated UTI  Difficult to determine degree of contribution given known underlying dementia.  -Fall precautions  -Keppra level slightly high at 48, lacosamide levels pending from the emergency department.  -Given slightly elevated Keppra level, continued encephalopathy consulted neurology for recommendations regarding antiepileptics,?  Other causes of encephalopathy.  -Keppra dose was decreased to 500 mg a.m. and 750 mg p.m. by neurology.  -Lacosamide level returned slightly elevated at 10.3. no change to lacosamide dose per neurology  -slightly more oriented today per RN who had pt yesterday. No SZ activity, has been interactive to a  degree. Taking po with assistance but then stops eating  - eating 25% po today.   - for me pt follows a few simple commands. nonfocal neuro exam  -encephalopathy persists.   -nl EEG 6/2020. Case reviewed with Gen Neurology 8/8 and arelish not likely 2/2 to meds. They do not suspect SZ activity. Consider repeat EEG tomorrow if not improving or clearly not interactive.   -nl TSH today    Plan: no change to current meds, head cT now to ensure no interval development of intracranial bleed given fall. Abx, fluids, have speech reeval pt today, will talk to family regarding guarded prognosis, consider palliative care consult, will need to discuss goal of care, rbc folate, b12, emperic thiamine    Frequent falls:   -Essentially monthly hospitalizations for falls with various orthopedic injuries including left shoulder injury in June, July.   - Significant prior work-up including ZIO patch monitoring without significant arrhythmias, recent TTE with mild to moderate aortic stenosis, multiple prior EEGs without seizure activity  -PT, OT, fall precuations    Occult pelvic fractures: Incidentally noted on CT abdomen pelvis.  Acute mildly displaced fracture of right pubic bone with suspected surrounding blood products, acute nondisplaced vertical right iliac wing fracture.  Patient with no complaints of pain, though history limited by dementia.  -Orthopedic surgery consulted for trauma evaluation  -They have recommended nonoperative management with WBAT with walker. Follow-up with [facility] at [facility] in 6 weeks for recheck.  Call [phone] for appointment.  -not having much pain. Some with repositioning  -Hemoglobin at 9.3 [11.9 at admission].  Continue to monitor  -PT/OT  -Fall precautions  -Compression stockings to bilateral lower extremities given prior concern for orthostasis as contributing factor to falls  -scheduled tylenol, warm packs to R hip     Acute on chronic anemia, possibly secondary to blood loss with pelvic  fractures:   - Patient presents with a hemoglobin of 11.9.  In the setting of pelvic fractures with possible intrapelvic blood loss, trending hemoglobin  -Hemoglobin trended down to 8.6.    -Repeated pelvic CT on 8/7 which showed stable fracture and small hematoma with no significant change from prior CT.   -Aspirin and Brilinta currently on hold given risk of pelvic bleed associated with occult fractures.    -Hb stable in 9 range.     Plan;   -follow Hb  - If worsening hemoglobin and concern for intra-abdominal bleed, may require interventional radiology embolization.  Low suspicion for this currently, though would monitor closely given the patient's dementia and potential inability to make needs known.  Would likely resume only aspirin and leave Brilinta on hold at discharge till hemoglobin stable.        Dysphagia  -Seen by speech pathology.    -Has been placed on DD 1 diet with nectar thickened liquids.  -follow po intake     History of seizure disorder:   -Follows with neurology, multiple fairly recent seizure evaluations without capture on EEG  -Neurology consulted for encephalopathy and seizure disorder  -Keppra level slightly elevated at 48 and lacosamide levels also slightly elevated at 10.3.    -neurology consulted to address lacosamide dosing --> given level only slightly above therapeutic range rec'd continuing same dose.   - -Keppra dose decreased by neurology as above.  -Follow up with Dr. Padilla clinic as out patient        Peripheral arterial disease:  Coronary artery disease with associated ischemic cardiomyopathy: Most recent ejection fraction April 2020 with 30 to 35%, moderate aortic stenosis  History of CVA:  Carotid artery stenosis: Status post right carotid enterectomy  Nonsustained ventricular tachycardia: Max 17 beat run on recent ZIO patch monitoring.  No symptoms at that time, though cannot rule out more persistent V. tach episodes resulting in falls.  Would not necessarily explain  encephalopathy currently noted.    Paroxysmal atrial fibrillation: Possible.  Not noted on recent ZIO patch monitoring  -Prior to admission aspirin and Brilinta on hold given intra-abdominal pelvic bleed associated with occult fractures  -Continue prior to admission atorvastatin  -Remains off of beta-blockade following recent cardiology ZIO patch monitoring and concern for orthostatic falls.  -HR 90s-100 range. Rate controlled.      Type 2 diabetes:  - Last hemoglobin A1c 6.3 on 6/1/2020.  - range. Not need tight control.  -Medium dose sliding scale insulin 4 times daily with meals and at bedtime     Covid negative 8/4/20      Diet:  DD 1 with nectar thickened liquids  Eating ~25% or less of meals. Stable since yesterday., more awake and oriented today. Nutrition consult  Follow daily lytes. Follow for need of continued ivfs    DVT Prophylaxis: Pneumatic compression devices  Luis Catheter: in place, indication:  Chronic urinary retention  Code Status: Full code       Goals of care conversation on 8/5: I discussed recurrent recent hospitalizations, general decline over the last few months and questioned the appropriateness of Dustin continuing to remain full code.  Discussed poor chances of meaningful survival after a CPR event given Dustin's overall frail condition and underlying dementia.  His wife Halina, insisted on Dustin remaining full code at this time and stated that she understood our concerns.  She would like to talk with his son first prior to making any changes.     8/7: Patient remains quite  lethargic with inconsistent oral intake.  Try to call Halina again today, went to voicemail.  -on 8/7 partner did talk with Halina on the phone later in the day.  I did express my concerns regarding labs overall decline and readdressed CODE STATUS.  He again would like to discuss more with Dustin's son prior to making any changes to his CODE STATUS.       Disposition Plan   Anticipate observing Dustin over the  weekend, would like to see better oral intake prior to discharge.  Anticipate discharge back to TCU on Monday if hemoglobin remains stable and he shows some better oral intake.       Min Watson MD  Text Page  (7am to 6pm)  Interval History   Per RN eating 25% or less of po intake  No focal complaints  More coherent today  Pt unable provide hx currently    -Data reviewed today: I reviewed all new labs and imaging results over the last 24 hours. I personally reviewed labs over last 24 hours.     Physical Exam   Temp: 97.9  F (36.6  C) Temp src: Axillary BP: 114/81 Pulse: 95 Heart Rate: 107 Resp: 18 SpO2: 92 % O2 Device: None (Room air)    Vitals:    08/06/20 0706 08/07/20 0617 08/08/20 0604   Weight: 63.2 kg (139 lb 6.4 oz) 60.3 kg (132 lb 14.4 oz) 65.2 kg (143 lb 12.8 oz)     Vital Signs with Ranges  Temp:  [96.5  F (35.8  C)-98.3  F (36.8  C)] 97.9  F (36.6  C)  Pulse:  [95] 95  Heart Rate:  [] 107  Resp:  [14-18] 18  BP: (114-140)/(61-81) 114/81  SpO2:  [92 %-93 %] 92 %  I/O last 3 completed shifts:  In: 1849 [I.V.:1849]  Out: 550 [Urine:550]    Constitutional: Up in bed, nad, appears comfortable.   Respiratory: Some dry cough, CTAB  Cardiovascular: RRR no r/g/m  GI: soft, nt, nd  Skin/Integumen: no rash or edema  Neuro'/Psych: minimally interactive at this time,  fingers on command, perrla, moves all 4 extrem, no abnl movements, face symmetric, not verbal with me but able to state name and place with RN earlier.        Medications     0.9% sodium chloride + KCl 20 mEq/L 50 mL/hr at 08/08/20 0924       acetaminophen  1,000 mg Oral TID     atorvastatin  20 mg Oral At Bedtime     cefTRIAXone  1 g Intravenous Q24H     DULoxetine  30 mg Oral Daily     insulin aspart  1-6 Units Subcutaneous 4x Daily w/meals     Lacosamide  100 mg Oral BID     levETIRAcetam  500 mg Oral Daily     levETIRAcetam  750 mg Oral At Bedtime     magnesium oxide  400 mg Oral 4x Daily w/meals     omeprazole  40 mg Oral QAM      polyethylene glycol-propylene glycol  2 drop Both Eyes QPM     senna-docusate  1 tablet Oral BID    Or     senna-docusate  2 tablet Oral BID     sodium chloride (PF)  3 mL Intracatheter Q8H     thiamine  100 mg Intravenous Daily       Data   Recent Labs   Lab 08/08/20  0828 08/07/20  0704 08/06/20  0745  08/05/20  0655  08/04/20  1942   WBC  --   --   --   --  12.4*  --  16.3*   HGB 9.5* 8.6* 9.3*   < > 10.0*   < > 11.9*   MCV  --   --   --   --  88  --  89   PLT  --   --   --   --  242  --  302    139  --   --  137  --  135   POTASSIUM 4.5 4.5  --   --  4.9  --  4.3   CHLORIDE 110* 110*  --   --  107  --  102   CO2 24 25  --   --  26  --  26   BUN 13 13  --   --  20  --  22   CR 0.60* 0.67  --   --  0.76  --  0.89   ANIONGAP 5 4  --   --  4  --  7   ALLISON 8.2* 7.7*  --   --  8.2*  --  9.1   * 157*  --   --  160*  --  176*   ALBUMIN  --   --   --   --  2.8*  --  3.4   PROTTOTAL  --   --   --   --  5.7*  --  6.7*   BILITOTAL  --   --   --   --  0.3  --  0.4   ALKPHOS  --   --   --   --  70  --  81   ALT  --   --   --   --  22  --  35   AST  --   --   --   --  12  --  21    < > = values in this interval not displayed.       Imaging:   No results found for this or any previous visit (from the past 24 hour(s)).

## 2020-08-08 NOTE — PROVIDER NOTIFICATION
"MD Notification    Notified Person: MD    Notified Person Name: Walter    Notification Date/Time: 8/8/20 4:48PM    Notification Interaction: page    Purpose of Notification: Pt is becoming restless, pulling at gown and catheter. Pt is experiencing some hallucinations: wants to \"catch a dragon\" and asking \"where his dad is\". Would you like something ordered?    Orders Received: Scheduled Seroquel tonight and PRN Haldol available.       "

## 2020-08-09 NOTE — PLAN OF CARE
Alert to self. Slow to respond, quiet. Denies pain except w/ repos. Dd1, nectar thick liquids: pills crushed in pudding. Luis w/ low bárbara output. L PIV infusing NS K20 @ 50 mL/hr. T/R q3h: promoting sleep. Plan for possible EEG 8/9.

## 2020-08-09 NOTE — PROGRESS NOTES
SPIRITUAL HEALTH SERVICES Progress Note  -2    Requested a visit from Fr. Dent, paged Fr. Dent and he will be here in an hour.       Huey Crum  Chaplain Resident

## 2020-08-09 NOTE — CONSULTS
Consult Date:  08/09/2020      CHIEF COMPLAINT:  Unwitnessed fall.      REASON FOR PSYCHIATRIC CONSULTATION:  For delirium.      HISTORY OF PRESENT ILLNESS:  A 89-year-old   male who was interviewed on station [id number].  His wife was present at his bedside.  Most of the interview was conducted with her aide.  Apparently, the patient was admitted on 08/04/2020 he may have had an unwitnessed fall at his care facility.  He hit his head and since then, the patient has been somewhat nonresponsive verbally.  He was lying in bed with his mouth open.  Asking the wife directly she denies that he has any previous psychopathology, though he is on Cymbalta 30 mg the reasons are unclear.  He was given either for depression or anxiety or possibly pain condition.  Has been put on Seroquel for the agitation or delirium.  Son may be pursuing some sort of guardianship.  The patient does not have any capacity right now to make any medical decisions.  Unable to get anything else out of the patient.      PAST PSYCHIATRIC HISTORY:  Wife denies anything though he is on Cymbalta.  No previous psych admissions, no previous suicide attempts.      CHEMICAL DEPENDENCY HISTORY:  Nothing significant.      PAST MEDICAL HISTORY:  See medical notes.      FAMILY HISTORY:  Nothing significant.      PERSONAL AND SOCIAL HISTORY:  The patient is , has children.  Currently living at a care facility.      REVIEW OF SYSTEMS:  See the note done by Germain Segura on 08/04/2020.      MENTAL STATUS EXAMINATION:  Appearance:  Dressed in scrubs.  Attitude:  Uncooperative.  Eye contact poor.  Mood:  Unable to assess.  Affect is flat.  Speech patient is nonverbal.  Psychomotor behavior:  No agitation or retardation observed.  Thought processes Unable to assess.  No loosening of associations.  Thought content:  Unable to assess.  Insight is poor.  Judgment is poor.  Orientation:  Unable to assess.  Attention not arousable, not sustained.  Memory:   Unable to assess.      IMPRESSION:   Axis I:  Delirium, not otherwise specified, rule out depression or anxiety (past history).   Axis II:  Deferred.   Axis III:  See medical notes.   Axis IV:  Social stressors, medical issues.   Axis V:  Global Assessment of Functionin.      PLAN:   Continue Cymbalta 30 mg for now, but clarify as to why he is on this dose. Seroquel 25 mg every evening to prevent agitation or delirium own.  Also offered p.r.n. during the day, 12.5 mg q.4 hours p.r.n.  At this point, the patient has no capacity to make medical decisions.  Please call Psychiatry for followup as needed.         ALMAZ IVAN MD             D: 2020   T: 2020   MT:       Name:     SID AGUIAR   MRN:      [id number]        Account:       [id number]   :      [id number]           Consult Date:  2020      Document: [id number]

## 2020-08-09 NOTE — PROVIDER NOTIFICATION
MD Notification    Notified Person: MD    Notified Person Name: Wlater    Notification Date/Time: 8/9/20 9:46 AM    Notification Interaction: paged    Purpose of Notification: FYI: Lactic 2.1    Orders Received: Give bolus 500 mL over 2 hrs.

## 2020-08-09 NOTE — PROGRESS NOTES
[facility]    Hospitalist Progress Note    Assessment & Plan   He presents after an unwitnessed fall at his care facility, concerned that he may have hit his head in the setting of mental slowing and anisocoria.     Catheter related urinary tract infection:  Generalized weakness most likely due to UTI  Hypophosphatemia   -Abnormal urinalysis with hematuria, pyuria.   - Patient with decreased mental acuity/mental slowing in the setting of dementia .  Catheter was exchanged in the emergency department prior to specimen being obtained  -Continue ceftriaxone 1 g every 24 hours.  Can switch to cefuroxime to complete a course at discharge.  -urine culture with 2 strains of E. coli, low colony counts, susceptible to Ceftriaxone. .  -Given history of renal stones, hematuria, left CVA tenderness with percussion, creatinine elevation from baseline, obtained noncontrast CT of abdomen and pelvis to rule out obstructive uropathy.  No hydronephrosis or perinephric stranding noted.    - wbc remains at 12. New fever and persisting lethargy.   Lactate 2. New tachycadia    Plan;   Changed to zozyn, pcxr. Blood cx,   Discussed care plan, goals of care and poor prognosis  For now cont with fluids (changed fluids) and fluid bolus but will likely proceed with comfort care later today    Addendum; cxR shows bilateral infultrates. Will need to rule out covid. Suspect aspiration pneumonia however.   If patient covid test negative keep in isolation. Dr. Watson to address in am. Checking ddimer, crp, LDH to see if significantly elevated.  Discussed with family.   See discussions below. At this time pt's son and pt's significant other wish to proceed with comfort care given his very poor prognosis, chronic medical issues, new pneumonia. Discussed comfort care measures in detail and need for pt to be isolated. Family and pt's significant other have no further questions and concerns. They both are in complete agreement with  comfort cares and is in line with patient's wishes as well.             Acute toxic metabolic encephalopathy, likely related to catheter associated UTI  Difficult to determine degree of contribution given known underlying dementia.  -Fall precautions  -Keppra level slightly high at 48, lacosamide levels pending from the emergency department.  -Given slightly elevated Keppra level, continued encephalopathy consulted neurology for recommendations regarding antiepileptics,?  Other causes of encephalopathy.  -Keppra dose was decreased to 500 mg a.m. and 750 mg p.m. by neurology.  -Lacosamide level returned slightly elevated at 10.3. no change to lacosamide dose per neurology  -slightly more oriented today per RN who had pt yesterday. No SZ activity, has been interactive to a degree. Taking po with assistance but then stops eating  - eating 25% po today.   - for me pt follows a few simple commands. nonfocal neuro exam  -encephalopathy persists.   -nl EEG 6/2020. Case reviewed with Gen Neurology 8/8 and encep not likely 2/2 to meds. They do not suspect SZ activity. Consider repeat EEG tomorrow if not improving or clearly not interactive.   -nl TSH   -head cT yest no acute findings. More awake last pm  -had seroquel last pm  -no sz activity. Had meds this am  -new fever today, wbc remains at 12, lethargy this am  Na is up to 145  -SIRS by criteria  -not safe for po. Coughing  R base crackles on exam  Concern for aspiration      Plan: for now, fluids, change fluids, bolus, cont new abx but will likely proceed with comfort cares later today after seen by son    Frequent falls:   -Essentially monthly hospitalizations for falls with various orthopedic injuries including left shoulder injury in June, July.   - Significant prior work-up including ZIO patch monitoring without significant arrhythmias, recent TTE with mild to moderate aortic stenosis, multiple prior EEGs without seizure activity  -PT, OT, fall  precuations    Occult pelvic fractures: Incidentally noted on CT abdomen pelvis.  Acute mildly displaced fracture of right pubic bone with suspected surrounding blood products, acute nondisplaced vertical right iliac wing fracture.  Patient with no complaints of pain, though history limited by dementia.  -Orthopedic surgery consulted for trauma evaluation  -They have recommended nonoperative management with WBAT with walker. Follow-up with [facility] at [facility] in 6 weeks for recheck.  Call [phone] for appointment.  -not having much pain. Some with repositioning  -Hemoglobin at 9.3 [11.9 at admission].  Continue to monitor  -PT/OT  -Fall precautions  -Compression stockings to bilateral lower extremities given prior concern for orthostasis as contributing factor to falls  -scheduled tylenol, warm packs to R hip     Acute on chronic anemia, possibly secondary to blood loss with pelvic fractures:   - Patient presents with a hemoglobin of 11.9.  In the setting of pelvic fractures with possible intrapelvic blood loss, trending hemoglobin  -Hemoglobin trended down to 8.6.    -Repeated pelvic CT on 8/7 which showed stable fracture and small hematoma with no significant change from prior CT.   -Aspirin and Brilinta currently on hold given risk of pelvic bleed associated with occult fractures.    -Hb stable in 9 range.     Plan;   -follow Hb  - If worsening hemoglobin and concern for intra-abdominal bleed, may require interventional radiology embolization.  Low suspicion for this currently, though would monitor closely given the patient's dementia and potential inability to make needs known.  Would likely resume only aspirin and leave Brilinta on hold at discharge till hemoglobin stable.        Dysphagia  -Seen by speech pathology.    -Has been placed on DD 1 diet with nectar thickened liquids.  -follow po intake    -not safe for po currently make npo     History of seizure disorder:   -Follows with neurology, multiple  fairly recent seizure evaluations without capture on EEG  -Neurology consulted for encephalopathy and seizure disorder  -Keppra level slightly elevated at 48 and lacosamide levels also slightly elevated at 10.3.    -neurology consulted to address lacosamide dosing --> given level only slightly above therapeutic range rec'd continuing same dose.   - -discussed antiepileptic drugs with neurology in setting of comfort care and likely decreasing renal fx  - prn ativan  - reduce vimpat to 100mg every day - change to IV dosing, equivelent with po dosiing  - tomorrow can reduce keppra to 500mg bid.   - will stop AED once death seems imminent. Possibly tomorrow.      Peripheral arterial disease:  Coronary artery disease with associated ischemic cardiomyopathy: Most recent ejection fraction April 2020 with 30 to 35%, moderate aortic stenosis  History of CVA:  Carotid artery stenosis: Status post right carotid enterectomy  Nonsustained ventricular tachycardia: Max 17 beat run on recent ZIO patch monitoring.  No symptoms at that time, though cannot rule out more persistent V. tach episodes resulting in falls.  Would not necessarily explain encephalopathy currently noted.    Paroxysmal atrial fibrillation: Possible.  Not noted on recent ZIO patch monitoring  -Prior to admission aspirin and Brilinta on hold given intra-abdominal pelvic bleed associated with occult fractures  -Continue prior to admission atorvastatin  -Remains off of beta-blockade following recent cardiology ZIO patch monitoring and concern for orthostatic falls.  -HR 90s-100 range. Rate controlled.      Type 2 diabetes:  - Last hemoglobin A1c 6.3 on 6/1/2020.  - range. Not need tight control.  -Medium dose sliding scale insulin 4 times daily with meals and at bedtime  - this am     Covid negative 8/4/20      Diet:  DD 1 with nectar thickened liquids  Eating ~25% or less of meals. Stable since yesterday., more awake and oriented today. Nutrition  consult  Follow daily lytes. Follow for need of continued ivfs  Pt would not want feeding tube per pt's son and pt's significant other.     DVT Prophylaxis: Pneumatic compression devices  Luis Catheter: in place, indication:  Chronic urinary retention  Code Status: Full code       Goals of care conversation on 8/5: I discussed recurrent recent hospitalizations, general decline over the last few months and questioned the appropriateness of Dustin continuing to remain full code.  Discussed poor chances of meaningful survival after a CPR event given Dustin's overall frail condition and underlying dementia.  His wife Halina, insisted on Dustin remaining full code at this time and stated that she understood our concerns.  She would like to talk with his son first prior to making any changes.     8/7: Patient remains quite  lethargic with inconsistent oral intake.  Try to call Halina again today, went to voicemail.  -on 8/7 partner did talk with Halina on the phone later in the day.  I did express my concerns regarding labs overall decline and readdressed CODE STATUS.  He again would like to discuss more with Dustin's son prior to making any changes to his CODE STATUS.    8/9: goals of care discusssion yesteray with pt's significant other, broached comfort care and dnr/dni status and discussed his poor prognosis.   Today worsening clinical status. rediscussed poor prognosis, his significant comorbidities and high likelihood for recurrent hospitalizations in future. Significant other and son describe pt having poor quality of  Life. Discussed that pt would not benefit from intubation or cpr and cause more suffering without clinical benefit short and longterm. They are supportive of dnr/dni status and believe pt would want this for himself. Changed code status to dnr/dni  Discussed transitioning to comfort care approach given his poor prognosis, high likelihood of recurrent hospitalization with continued deterioration in quality  of life, poor po intake, and acute deterioration today withguarded prognosis. +SIRS currently. Son and pt's significant other feel pt would want to focus on comfort and move to comfort cares. Significant other of 40 years and pt's son fully supportive of this and plan is to likely proceed with this after son arrives at hospital. No escalation in care. Cont current cares until son arrives. Son and significant other clear that pt would not want a feeding tube and their would not be longterm clinical benefit and may cause worsening quality of life.      Disposition Plan   suspect may die in hospital - likely go to comfort cares today       Min Watson MD  Text Page  (7am to 6pm)  Interval History   New fever and tachy, more lethargic today  Pt unable to provide hx  -Data reviewed today: I reviewed all new labs and imaging results over the last 24 hours. I personally reviewed labs over last 24 hours.     Physical Exam   Temp: 99.1  F (37.3  C) Temp src: Axillary BP: 123/72   Heart Rate: 109 Resp: 18 SpO2: 94 % O2 Device: None (Room air)    Vitals:    08/07/20 0617 08/08/20 0604 08/09/20 0516   Weight: 60.3 kg (132 lb 14.4 oz) 65.2 kg (143 lb 12.8 oz) 63 kg (138 lb 12.8 oz)     Vital Signs with Ranges  Temp:  [97.1  F (36.2  C)-99.4  F (37.4  C)] 99.1  F (37.3  C)  Heart Rate:  [104-109] 109  Resp:  [18] 18  BP: (123-130)/(62-72) 123/72  SpO2:  [92 %-95 %] 94 %  I/O last 3 completed shifts:  In: 1139 [I.V.:1139]  Out: 625 [Urine:625]    Constitutional: Up in bed, nad, appears comfortable.   HEENT: legally blind  Respiratory: Some dry cough, CTAB, mild tachypnea  Cardiovascular: RRR no r/g/m, tachy  GI: soft, nt, nd  Skin/Integumen: no rash or edema  Neuro'/Psych: grossly nonfocal. No abnl movements, not interactive. Nonverbal. ? Wiggle toes on command, eyes open.        Medications     0.9% sodium chloride + KCl 20 mEq/L 50 mL/hr at 08/08/20 0924       acetaminophen  1,000 mg Oral TID     atorvastatin  20 mg Oral  At Bedtime     cefTRIAXone  1 g Intravenous Q24H     DULoxetine  30 mg Oral Daily     insulin aspart  1-6 Units Subcutaneous 4x Daily w/meals     Lacosamide  100 mg Oral BID     levETIRAcetam  500 mg Oral Daily     levETIRAcetam  750 mg Oral At Bedtime     magnesium oxide  400 mg Oral 4x Daily w/meals     omeprazole  40 mg Oral QAM     polyethylene glycol-propylene glycol  2 drop Both Eyes QPM     QUEtiapine  25 mg Oral QPM     senna-docusate  1 tablet Oral BID    Or     senna-docusate  2 tablet Oral BID     sodium chloride (PF)  3 mL Intracatheter Q8H     thiamine  100 mg Intravenous Daily       Data   Recent Labs   Lab 08/09/20  0732 08/08/20  0828 08/07/20  0704  08/05/20  0655  08/04/20  1942   WBC 12.6*  --   --   --  12.4*  --  16.3*   HGB 9.0* 9.5* 8.6*   < > 10.0*   < > 11.9*   MCV 90  --   --   --  88  --  89     --   --   --  242  --  302   * 139 139  --  137  --  135   POTASSIUM 5.1 4.5 4.5  --  4.9  --  4.3   CHLORIDE 117* 110* 110*  --  107  --  102   CO2 22 24 25  --  26  --  26   BUN 16 13 13  --  20  --  22   CR 0.54* 0.60* 0.67  --  0.76  --  0.89   ANIONGAP 6 5 4  --  4  --  7   ALLSION 8.1* 8.2* 7.7*  --  8.2*  --  9.1   * 141* 157*  --  160*  --  176*   ALBUMIN  --   --   --   --  2.8*  --  3.4   PROTTOTAL  --   --   --   --  5.7*  --  6.7*   BILITOTAL  --   --   --   --  0.3  --  0.4   ALKPHOS  --   --   --   --  70  --  81   ALT  --   --   --   --  22  --  35   AST  --   --   --   --  12  --  21    < > = values in this interval not displayed.       Imaging:   Recent Results (from the past 24 hour(s))   CT Head w/o Contrast    Narrative    CT OF THE HEAD WITHOUT CONTRAST   8/8/2020 12:17 PM     COMPARISON: Head CT 8/4/2020    HISTORY:  Altered level of consciousness (LOC), unexplained; ;  persisting encephalopathy, fall, rule out intracranial bleed     TECHNIQUE:  Axial CT images of the head from the skull base to the  vertex were acquired without IV contrast.    FINDINGS:    INTRACRANIAL CONTENTS: No intracranial hemorrhage, extraaxial  collection, or mass effect.  No CT evidence of acute infarct.    Encephalomalacia in the left frontal operculum and insula. Scattered  small chronic infarcts in the cerebellum. Severe presumed chronic  small vessel ischemic change. Moderate generalized volume loss. These  findings are unchanged.    VISUALIZED ORBITS/SINUSES/MASTOIDS: No significant orbital  abnormality.  No significant paranasal sinus mucosal disease. No  significant middle ear or mastoid effusion.    OSSEOUS STRUCTURES/SOFT TISSUES: No significant abnormality.      Impression    IMPRESSION:  1.  No acute abnormality.  2.  Advanced chronic small vessel ischemic change and volume loss.  3.  Chronic infarcts in the left frontal lobe.  4.  Chronic small cerebellar infarcts.  5.  No change.    Radiation dose for this scan was reduced using automated exposure  control, adjustment of the mA and/or kV according to patient size, or  iterative reconstruction technique    JW CONNER MD

## 2020-08-09 NOTE — PROGRESS NOTES
Sepsis Evaluation Progress Note    I was called to see Dustin Pearce due to a change in vital signs. He is known to have an infection.     Physical Exam   Vital Signs:  Temp: 100  F (37.8  C) Temp src: Axillary BP: 121/71   Heart Rate: 118 Resp: 18 SpO2: 93 % O2 Device: None (Room air)      Lab:  Lactic Acid   Date Value Ref Range Status   05/01/2020 2.8 (H) 0.7 - 2.0 mmol/L Final     Lactate for Sepsis Protocol   Date Value Ref Range Status   08/09/2020 2.1 (H) 0.7 - 2.0 mmol/L Final     Comment:     Significant value called to and read back by  MYRNA RICHMOND ON 88AT [id number]         The patient has signs of altered level of consciousness suspicious for confusion, lethargy.     The rest of their physical exam is significant for righ base crackles    Assessment & Plan   Dustin Pearce meets SIRS criteria but does NOT have a lactate >2 or other evidence of acute organ damage.  These vital sign, lab and physical exam findings are consistent with SEPSIS.    Sepsis Time-Zero (time Sepsis diagnosis confirmed): 1048 am  08/09/20    Anti-infectives (From now, onward)    Start     Dose/Rate Route Frequency Ordered Stop    08/09/20 1100  piperacillin-tazobactam (ZOSYN) 3.375 g vial to attach to  mL bag      3.375 g  over 30 Minutes Intravenous EVERY 6 HOURS 08/09/20 1047          Current antibiotic coverage change abx to zozyn.     Disposition: The patient will remain on the current unit. We will continue to monitor this patient closely. change abx, fluid bolus.  Min Watson MD    Sepsis Criteria   Sepsis: 2+ SIRS criteria due to infection  Severe Sepsis: Sepsis AND 1+ new sign of acute organ dysfunction (Note: lactate >2 is organ dysfunction)  Septic Shock: Sepsis AND hypotension despite volume resuscitation with 30 ml/kg crystalloid

## 2020-08-09 NOTE — PLAN OF CARE
Pt alert to self. Slept between cares. Slow to respond/no verbal response at times. No indicators of pain except w/ repositioning. Minimal intake, taking pills crushed in pudding. Luis w/ bárbara output. No BM this shift. PIV w/ NS+K at 50 ml/hr. Turn/repo q2hrs. Plan for possible EEG tomorrow.

## 2020-08-10 NOTE — PLAN OF CARE
DATE & TIME: 8/9/2020 4470-5003    Cognitive Concerns/ Orientation : JAIME, pt aphasic.    BEHAVIOR & AGGRESSION TOOL COLOR: Green  MOBILITY: Lift  PAIN MANAGMENT: Denies  DIET: NPO, high aspiration risk  BOWEL/BLADDER: Incontinent  ABNL LAB/BG: CRP inflammation 156.0  DRAIN/DEVICES: PIV SL. Luis, 200 mL UOP cloudy, bárbara.   SKIN: Generalized bruises, scabs  TESTS/PROCEDURES: N/a  D/C DAY/GOALS/PLACE: D  OTHER IMPORTANT INFO: COVID NEGATIVE, MD aware, per notes pt will remain on isolation protocol. Comfort care. Pt appears at ease. Continue to monitor for S/S of discomfort.

## 2020-08-10 NOTE — PLAN OF CARE
Physical Therapy Discharge Summary    Reason for therapy discharge:    Change in medical status.    Progress towards therapy goal(s). See goals on Care Plan in [facility] electronic health record for goal details.  Goals not met.  Barriers to achieving goals:   Change in medical status, pt now comfort cares.    Therapy recommendation(s):    No further therapy is recommended.

## 2020-08-10 NOTE — PROGRESS NOTES
Palliative consult received for goals of care. Pt transitioned to comfort measures this weekend. Reviewed case with Dr. Watson, no further identifiable palliative needs at this time. Will cancel consult. Thanks.    Lluvia LOUISE, [facility]  Palliative Medicine   Pager: [phone]

## 2020-08-10 NOTE — PLAN OF CARE
COVID result pending. Alert to self. Aphasic. NPO. A2 bed mobility: mechanical lift. Turn/repo. Luis in place. Pt appears at ease. L PIV SL. Continue to monitor for s/s of discomfort.

## 2020-08-10 NOTE — PROVIDER NOTIFICATION
MD Notification    Notified Person: MD    Notified Person Name: Eulogio    Notification Date/Time: 8/10/2020 0200  Notification Interaction: OBS 21 RF FYI pt COVID negative.     Purpose of Notification:    Orders Received:    Comments:

## 2020-08-10 NOTE — PROGRESS NOTES
[facility]    Hospitalist Progress Note    Assessment & Plan   He presents after an unwitnessed fall at his care facility, concerned that he may have hit his head in the setting of mental slowing and anisocoria.     Catheter related urinary tract infection:  Generalized weakness most likely due to UTI  Hypophosphatemia   -Abnormal urinalysis with hematuria, pyuria.   - Patient with decreased mental acuity/mental slowing in the setting of dementia .  Catheter was exchanged in the emergency department prior to specimen being obtained  -Continue ceftriaxone 1 g every 24 hours.  Can switch to cefuroxime to complete a course at discharge.  -urine culture with 2 strains of E. coli, low colony counts, susceptible to Ceftriaxone. .  -Given history of renal stones, hematuria, left CVA tenderness with percussion, creatinine elevation from baseline, obtained noncontrast CT of abdomen and pelvis to rule out obstructive uropathy.  No hydronephrosis or perinephric stranding noted.    - 8/9 wbc remains at 12. New fever and persisting lethargy.   Lactate 2. New tachycadia  -cxR showedbilateral infultrates. Will need to rule out covid. Suspect aspiration pneumonia however.   Discussed with family.   -pt's son and pt's significant other wish to proceed with comfort care given his very poor prognosis, chronic medical issues, new pneumonia. Discussed comfort care measures in detail and need for pt to be isolated. Family and pt's significant other have no further questions and concerns. They both are in complete agreement with comfort cares and is in line with patient's wishes as well.     Today; tachpneic, not responsive, no secretions issues.no coughing. Not safe for po intake given risk for aspiration which could cause distress, suspect aspiration pneumonia. covid testing negative last pm        Plan;comfort cares, breathing easily, not safe for po given risk for aspiration .,comfort care meds. Has sweats and sob  with narcotics so will avoid. Prn ativan for resp distress            Acute toxic metabolic encephalopathy, likely related to catheter associated UTI  Difficult to determine degree of contribution given known underlying dementia.  -Fall precautions  -Keppra level slightly high at 48, lacosamide levels pending from the emergency department.  -Given slightly elevated Keppra level, continued encephalopathy consulted neurology for recommendations regarding antiepileptics,?  Other causes of encephalopathy.  -Keppra dose was decreased to 500 mg a.m. and 750 mg p.m. by neurology.  -Lacosamide level returned slightly elevated at 10.3. no change to lacosamide dose per neurology  -slightly more oriented today per RN who had pt yesterday. No SZ activity, has been interactive to a degree. Taking po with assistance but then stops eating  - eating 25% po today.   - for me pt follows a few simple commands. nonfocal neuro exam  -encephalopathy persists.   -nl EEG 6/2020. Case reviewed with Gen Neurology 8/8 and enceph not likely 2/2 to meds. They do not suspect SZ activity. Consider repeat EEG tomorrow if not improving or clearly not interactive.   -nl TSH   -head cT yest no acute findings. More awake last pm  -had seroquel last pm  -no sz activity. Had meds this am  -new fever 8/9, wbc remains at 12, lethargy   Na is up to 145  -SIRS by criteria  -not safe for po. Coughing  R base crackles on exam  Concern for aspiration  covid negative. Negative on admission as well.   Remove isolation as suspect aspiration pneumonia      Plan:transitioned to comfort cares    Frequent falls:   -Essentially monthly hospitalizations for falls with various orthopedic injuries including left shoulder injury in June, July.   - Significant prior work-up including ZIO patch monitoring without significant arrhythmias, recent TTE with mild to moderate aortic stenosis, multiple prior EEGs without seizure activity    Occult pelvic fractures: Incidentally  noted on CT abdomen pelvis.  Acute mildly displaced fracture of right pubic bone with suspected surrounding blood products, acute nondisplaced vertical right iliac wing fracture.  Patient with no complaints of pain, though history limited by dementia.  -Orthopedic surgery consulted for trauma evaluation  -They have recommended nonoperative management with WBAT with walker. Follow-up with Ward at [facility] in 6 weeks for recheck.  Call [phone] for appointment.  -not having much pain. Some with repositioning  -Hemoglobin at 9.3 [11.9 at admission].  Continue to monitor  -prn tylenol     Acute on chronic anemia, possibly secondary to blood loss with pelvic fractures:   - Patient presents with a hemoglobin of 11.9.  In the setting of pelvic fractures with possible intrapelvic blood loss, trending hemoglobin  -Hemoglobin trended down to 8.6.    -Repeated pelvic CT on 8/7 which showed stable fracture and small hematoma with no significant change from prior CT.   -Aspirin and Brilinta currently on hold given risk of pelvic bleed associated with occult fractures.    -Hb stable in 9 range.   -comfort cares        Dysphagia  -Seen by speech pathology.    -Has been placed on DD 1 diet with nectar thickened liquids.  -follow po intake    -not safe for po currently make npo  -comfort cares     History of seizure disorder:   -Follows with neurology, multiple fairly recent seizure evaluations without capture on EEG  -Neurology consulted for encephalopathy and seizure disorder  -Keppra level slightly elevated at 48 and lacosamide levels also slightly elevated at 10.3.    -neurology consulted to address lacosamide dosing --> given level only slightly above therapeutic range rec'd continuing same dose.   - -discussed antiepileptic drugs with neurology in setting of comfort care and likely decreasing renal fx  - prn ativan for sz  - reduced vimpat to 100mg every day - change to IV dosing, equivelent with po dosiing, discussed  "with neurology  -  reduce keppra to 500mg bid today, per neurology  - will stop AED once death seems imminent. Possibly tomorrow.      Peripheral arterial disease:  Coronary artery disease with associated ischemic cardiomyopathy: Most recent ejection fraction April 2020 with 30 to 35%, moderate aortic stenosis  History of CVA:  Carotid artery stenosis: Status post right carotid enterectomy  Nonsustained ventricular tachycardia: Max 17 beat run on recent ZIO patch monitoring.  No symptoms at that time, though cannot rule out more persistent V. tach episodes resulting in falls.  Would not necessarily explain encephalopathy currently noted.    Paroxysmal atrial fibrillation: Possible.  Not noted on recent ZIO patch monitoring  -Prior to admission aspirin and Brilinta on hold given intra-abdominal pelvic bleed associated with occult fractures  -Continue prior to admission atorvastatin  -Remains off of beta-blockade following recent cardiology ZIO patch monitoring and concern for orthostatic falls.  -HR 90s-100 range. Rate controlled.      Type 2 diabetes:   Covid negative 8/4/20 and 8/9  Take out of isolation. Suspect aspiration pneumonia     Diet:  not safe for po at this time. Risk for aspiration.     DVT Prophylaxis:comfort care  Luis Catheter: in place, indication:  Chronic urinary retention  Code status. Dnr/dni. Comfort cares  Transitioned to comfort cares on 8/9.   Discussed with palliative care today and they had rec'd comfort cares in past as well. No acute comfort needs at this time  Cont AED as this time until death seems imminent.   Take out of isolation          Disposition Plan   suspect may die in hospital - likely go to comfort cares today    Discussed with pallative care, RN, updated pt's son who was without concerns or questions       Min Watson MD  Text Page  (7am to 6pm)  Interval History   covid negative last pm  Pt unable to provide hx. Pt not communicative at this time. Said \"no\" earlier " to RN when asked about pain    -Data reviewed today: I reviewed all new labs and imaging results over the last 24 hours. I personally reviewed labs over last 24 hours.     Physical Exam   Temp: 98.1  F (36.7  C) Temp src: Oral BP: 127/66   Heart Rate: 116 Resp: 22 SpO2: 95 % O2 Device: None (Room air)    Vitals:    08/07/20 0617 08/08/20 0604 08/09/20 0516   Weight: 60.3 kg (132 lb 14.4 oz) 65.2 kg (143 lb 12.8 oz) 63 kg (138 lb 12.8 oz)     Vital Signs with Ranges  Temp:  [98.1  F (36.7  C)] 98.1  F (36.7  C)  Heart Rate:  [116] 116  Resp:  [22] 22  BP: (127)/(66) 127/66  SpO2:  [95 %] 95 %  I/O last 3 completed shifts:  In: 137 [I.V.:137]  Out: 300 [Urine:300]    Constitutional:laying in bed, nad, appears comfortable, no distress, not communicative..   HEENT: legally blind  Respiratory: No cough today, lungs sound clearer,  tachypnea stable.   Cardiovascular: RRR no r/g/m, tachy  GI: soft, nt, nd  Skin/Integumen: no rash or edema  Neuro'/Psych: [place]. No purposeful movment,  grossly nonfocal. No abnl movements, not interactive. Nonverbal. Eye open. Not following commands     Medications       lacosamide (VIMPAT) intermittent infusion  100 mg Intravenous Daily     levETIRAcetam  750 mg Intravenous At Bedtime     levETIRAcetam  500 mg Intravenous Daily       Data   Recent Labs   Lab 08/09/20  0732 08/08/20  0828 08/07/20  0704  08/05/20  0655  08/04/20  1942   WBC 12.6*  --   --   --  12.4*  --  16.3*   HGB 9.0* 9.5* 8.6*   < > 10.0*   < > 11.9*   MCV 90  --   --   --  88  --  89     --   --   --  242  --  302   * 139 139  --  137  --  135   POTASSIUM 5.1 4.5 4.5  --  4.9  --  4.3   CHLORIDE 117* 110* 110*  --  107  --  102   CO2 22 24 25  --  26  --  26   BUN 16 13 13  --  20  --  22   CR 0.54* 0.60* 0.67  --  0.76  --  0.89   ANIONGAP 6 5 4  --  4  --  7   ALLISON 8.1* 8.2* 7.7*  --  8.2*  --  9.1   * 141* 157*  --  160*  --  176*   ALBUMIN  --   --   --   --  2.8*  --  3.4   PROTTOTAL  --   --   --    --  5.7*  --  6.7*   BILITOTAL  --   --   --   --  0.3  --  0.4   ALKPHOS  --   --   --   --  70  --  81   ALT  --   --   --   --  22  --  35   AST  --   --   --   --  12  --  21    < > = values in this interval not displayed.       Imaging:   Recent Results (from the past 24 hour(s))   XR Chest Port 1 View    Narrative    XR CHEST PORT 1 VW   8/9/2020 11:15 AM     HISTORY: fever, cough, rule out infultrate    COMPARISON: 8/4/2020      Impression    IMPRESSION: Patchy bibasilar opacities concerning for multifocal  pneumonitis.    No pleural effusion or pneumothorax. Cardiac and mediastinal  silhouettes unremarkable.    GEE RODRIGUEZ MD

## 2020-08-10 NOTE — PLAN OF CARE
Speech Language Therapy Discharge Summary    Reason for therapy discharge:    Change in goals of care - comfort cares initiated    Progress towards therapy goal(s). See goals on Care Plan in Epic electronic health record for goal details.  Goals not met.  Barriers to achieving goals:   change in goals of care based on change in medical POC.    Therapy recommendation(s):    No further therapy is recommended.      Most recent SLP recommendations - Recommend: Continue DDL1 and nectar thick liquids. 1:1 assist needed with intake to verify swallows, alternate solids and liquids and take double swallows as needed. Monitor for increased s/s of aspiration. Continue meds crushed in pudding with slow rate of intake.

## 2020-08-10 NOTE — CONSULTS
Care Transition Initial Assessment - SW     Spoke to: dewayne Lauren via telephone    Active Problems:    Pelvic fracture (H)       DATA  Lives With: spouse   Quality of Family Relationships: supportive, helpful, involved  Description of Support System: Supportive, Involved  Who is your support system?: Wife, son   Support Assessment: Adequate family and caregiver support, Adequate social supports.   Identified issues/concerns regarding health management: Son inquiring about hospital coverage and  arrangements   Quality of Family Relationships: supportive, helpful, involved     ASSESSMENT  Cognitive Status:  non-responsive  Concerns to be addressed: financial and  arrangements.  SW consulted to assist with financial concerns, emotional support and  arrangements.  Pt is a 89 yr old male who admitted from [facility] where he had just finished his 100 Medicare days according to son Leonidas. Patient admitted with a pelvic fracture on 20 and most recently transitioned to comfort cares. MD indicates patient likely to pass in hospital in the next 72 hours.    INTERVENTION  SW spoke to son Leonidas via telephone and will meet in person once he arrives to hospital this afternoon, approx at 1300. Son inquring about medicare coverage for hospital stay and also curious about next steps. Leonidas states there has been a  home chosen although he was driving and not able to access the info. SW to meet with son this afternoon to provide info/ support.     PLAN  Patient likely to pass during this hospitalization.    ADDENDUM:  SW met with pt's son Leonidas, offering business card and telephone number to the  Business office to assist with answering son's financial questions about hospital coverage. SW provided emotional support to son who relayed thankfulness that patient is in the hospital where they can visit as COVID has prevented family from visiting for the past 3 months.    SW to continue to follow and  assist with discharge planning.    ROYA Prater  Daytime (8:00am-4:30pm): [phone]  After-Hours SW Pager (4:30pm-11:30pm): [phone]

## 2020-08-11 NOTE — PROGRESS NOTES
[facility]    Hospitalist Progress Note    Assessment & Plan   He presents after an unwitnessed fall at his care facility, concerned that he may have hit his head in the setting of mental slowing and anisocoria.        Acute toxic metabolic encephalopathy, likely related to catheter associated UTI  See previuos note for detials    Catheter related urinary tract infection:  Generalized weakness most likely due to UTI  Hypophosphatemia   -Abnormal urinalysis with hematuria, pyuria.   - Patient with decreased mental acuity/mental slowing in the setting of dementia .  Catheter was exchanged in the emergency department prior to specimen being obtained  -Continue ceftriaxone 1 g every 24 hours.  Can switch to cefuroxime to complete a course at discharge.  -urine culture with 2 strains of E. coli, low colony counts, susceptible to Ceftriaxone. .  -Given history of renal stones, hematuria, left CVA tenderness with percussion, creatinine elevation from baseline, obtained noncontrast CT of abdomen and pelvis to rule out obstructive uropathy.  No hydronephrosis or perinephric stranding noted.    - 8/9 wbc remains at 12. New fever and persisting lethargy.   Lactate 2. New tachycadia  -cxR showedbilateral infultrates. Will need to rule out covid. Suspect aspiration pneumonia however.   Discussed with family.   -pt's son and pt's significant other wish to proceed with comfort care given his very poor prognosis, chronic medical issues, new pneumonia. Discussed comfort care measures in detail and need for pt to be isolated. Family and pt's significant other have no further questions and concerns. They both are in complete agreement with comfort cares and is in line with patient's wishes as well.     -yesterday tachpneic, not responsive, no secretions issues.no coughing. Not safe for po intake given risk for aspiration which could cause distress, suspect aspiration pneumonia. covid testing negative last  pm    Today  Unresponsive. Change in breathing, agonal, more tachy  No secretions. Appears comfortable.   Explained dying process to family and reiterated it may take several days. At this time,suspect pt to pass within 24h ours.   No pain issues. Appears comfortable.         Plan;comfort cares,  not safe for po given risk for aspiration .,comfort care meds. Has sweats and sob with narcotics. Not clearly allergic reaction. Family wondering if narcotics could be considered. Will try iv dilaudid prn, discussed with RN and family . Prn ativan for resp distress         -    Plan:transitioned to comfort cares    Frequent falls:     Occult pelvic fractures: Incidentally noted on CT abdomen pelvis.  Acute mildly displaced fracture of right pubic bone with suspected surrounding blood products, acute nondisplaced vertical right iliac wing fracture.  Patient with no complaints of pain, though history limited by dementia.  -Orthopedic surgery consulted for trauma evaluation  -no clear pain issues     Acute on chronic anemia, possibly secondary to blood loss with pelvic fractures:   -    Dysphagia  -not safe for po currently make npo  -comfort cares     History of seizure disorder:   -Follows with neurology, multiple fairly recent seizure evaluations without capture on EEG  -Neurology consulted for encephalopathy and seizure disorder  -Keppra level slightly elevated at 48 and lacosamide levels also slightly elevated at 10.3.    -neurology consulted to address lacosamide dosing --> given level only slightly above therapeutic range rec'd continuing same dose.   - -discussed antiepileptic drugs with neurology in setting of comfort care and likely decreasing renal fx  - prn ativan for sz  - reduced vimpat to 100mg every day - change to IV dosing, equivelent with po dosiing, discussed with neurology  -  reduce keppra to 500mg bid today, per neurology  - will stop AED once death seems imminent. Possibly tomorrow.      Peripheral  arterial disease:  Coronary artery disease with associated ischemic cardiomyopathy:     History of CVA:  Carotid artery stenosis: Status post right carotid enterectomy  Nonsustained ventricular tachycardia: Max 17 beat run on recent ZIO patch monitoring.  No symptoms at that time, though cannot rule out more persistent V. tach episodes resulting in falls.  Would not necessarily explain encephalopathy currently noted.    Paroxysmal atrial fibrillation:    Type 2 diabetes:   Covid negative 8/4/20 and 8/9       Diet:  not safe for po at this time. Risk for aspiration.     DVT Prophylaxis:comfort care  Luis Catheter: in place, indication:  Chronic urinary retention  Code status. Dnr/dni. Comfort cares  Transitioned to comfort cares on 8/9.   Discussed with palliative care today and they had rec'd comfort cares in past as well. No acute comfort needs at this time  Cont AED as this time until death seems imminent.   Take out of isolation          Disposition Plan   will pass in hospital. Discussed care plan with pt,son, RN,pt's significant other     Min Watson MD  Text Page  (7am to 6pm)  Interval History   Family at bedside  They are wondering if iv narcotic an option in this patient and if his change in condition is as expected  No RN concerns  Pt has not appeared uncomfortable  Has been unresponsive today  Change in breathing overnight      -Data reviewed today: I reviewed all new labs and imaging results over the last 24 hours. I personally reviewed labs over last 24 hours.     Physical Exam                      Vitals:    08/07/20 0617 08/08/20 0604 08/09/20 0516   Weight: 60.3 kg (132 lb 14.4 oz) 65.2 kg (143 lb 12.8 oz) 63 kg (138 lb 12.8 oz)     Vital Signs with Ranges     I/O last 3 completed shifts:  In: -   Out: 150 [Urine:150]    Constitutional: in bed, unresponsive  HEENT: legally blind  Respiratory: Tachypneic, no wheezing, no course bS or secretions  Cardiovascular: RRR tachy  GI: soft, nt,  nd  Skin/Integumen: no rash or edema  Neuro'/Psych: [place]. unresponsive   Medications       lacosamide (VIMPAT) intermittent infusion  100 mg Intravenous Daily     levETIRAcetam  500 mg Intravenous Q12H       Data   Recent Labs   Lab 08/09/20  0732 08/08/20  0828 08/07/20  0704  08/05/20  0655  08/04/20  1942   WBC 12.6*  --   --   --  12.4*  --  16.3*   HGB 9.0* 9.5* 8.6*   < > 10.0*   < > 11.9*   MCV 90  --   --   --  88  --  89     --   --   --  242  --  302   * 139 139  --  137  --  135   POTASSIUM 5.1 4.5 4.5  --  4.9  --  4.3   CHLORIDE 117* 110* 110*  --  107  --  102   CO2 22 24 25  --  26  --  26   BUN 16 13 13  --  20  --  22   CR 0.54* 0.60* 0.67  --  0.76  --  0.89   ANIONGAP 6 5 4  --  4  --  7   ALLISON 8.1* 8.2* 7.7*  --  8.2*  --  9.1   * 141* 157*  --  160*  --  176*   ALBUMIN  --   --   --   --  2.8*  --  3.4   PROTTOTAL  --   --   --   --  5.7*  --  6.7*   BILITOTAL  --   --   --   --  0.3  --  0.4   ALKPHOS  --   --   --   --  70  --  81   ALT  --   --   --   --  22  --  35   AST  --   --   --   --  12  --  21    < > = values in this interval not displayed.       Imaging:   No results found for this or any previous visit (from the past 24 hour(s)).

## 2020-08-11 NOTE — PLAN OF CARE
Pt arrived from Obs, covid negative. Pt comfort cares, all assessments and VS deferred. Lethargic, JAIME orientation. PRN ativan to keep comfortable. Incontinent, ruiz catheter in place with minimal UOP. NPO due to aspirations. T/R Q2h. Pt grimaces with T/R.  PIV SL w/ int IV keppra. Continue to keep pt comfortable.

## 2020-08-11 NOTE — PLAN OF CARE
[id number]: Comfort cares continued. Vitals and full assessment deferred. RR labored at times. PRN ativan given x1. Audible secretions, PRN atropine given x1. Luis catheter with low output. Scattered bruising/scabbing. Blanchable redness to coccyx. Turn and repo Q2H. NPO. Oral cares. L PIV SL. Patient expected to pass away while in the hospital. Spouse and family present at bedside.

## 2020-08-11 NOTE — DEATH PRONOUNCEMENT
MD DEATH PRONOUNCEMENT    Called to pronounce Dustin Pearce dead.    Physical Exam: Unresponsive to noxious stimuli, Spontaneous respirations absent, Breath sounds absent and Heart sounds absent    Patient was pronounced dead at 1: 07 PM, 2020.    Preliminary Cause of Death: Sepsis    Active Problems:    Pelvic fracture (H)      Infectious disease present?: YES    Communicable disease present? (examples: HIV, chicken pox, TB, Ebola, CJD) :  NO    Multi-drug resistant organism present? (example: MRSA): NO    Please consider an autopsy if any of the following exist:  NO Unexpected or unexplained death during or following any dental, medical, or surgical diagnostic treatment procedures.   NO Death of mother at or up to seven days after delivery.     NO All  and pediatric deaths.     NO Death where the cause is sufficiently obscure to delay completion of the death certificate.   NO Deaths in which autopsy would confirm a suspected illness/condition that would affect surviving family members or recipients of transplanted organs.     The following deaths must be reported to the 's Office:  NO A death that may be due entirely or in part to any factors other than natural disease (recent surgery, recent trauma, suspected abuse/neglect).   NO A death that may be an accident, suicide, or homicide.     NO Any sudden, unexpected death in which there is no prior history of significant heart disease or any other condition associated with sudden death.   NO A death under suspicious, unusual, or unexpected circumstances.    NO Any death which is apparently due to natural causes but in which the  does not have a personal physician familiar with the patient s medical history, social, or environmental situation or the circumstances of the terminal event.   NO Any death apparently due to Sudden Infant Death Syndrome.     NO Deaths that occur during, in association with, or as consequences of a  diagnostic, therapeutic, or anesthetic procedure.   YES Any death in which a fracture of a major bone has occurred within the past (6) six months.   NO A death of persons note seen by their physician within 120 days of demise.     NO Any death in which the  was an inmate of a public institution or was in the custody of Law Enforcement personnel.   NO  All unexpected deaths of children   NO Solid organ donors   NO Unidentified bodies   NO Deaths of persons whose bodies are to be cremated or otherwise disposed of so that the bodies will later be unavailable for examination;   NO Deaths unattended by a physician outside of a licensed healthcare facility or licensed residential hospice program   NO Deaths occurring within 24 hours of arrival to a health care facility if death is unexpected.    NO Deaths associated with the decedent s employment.   NO Deaths attributed to acts of terrorism.   NO Any death in which there is uncertainty as to whether it is a medical examiner s care should be discussed with the medical investigator.        Body disposition: Per family    Please direct the death certificate to Dr. Watson, Hospitalist.    ASPEN Lovett, CNP  Hospitalist Service, House Officer  [facility]     Text Page  Pager: [phone]

## 2020-08-11 NOTE — DISCHARGE SUMMARY
[facility]    Death Summary  Hospitalist    Date of Admission:  8/4/2020  Date of Death:         8/11/2020  Provider Completing Death Summary: Min Watson MD    Discharge Diagnoses   Aspiration pneumonia  Sepsis (SIRS d/t infection) -aspiration pnemonia  Metabolic encephalopathy  Dysphagia  Vascular dementia  Acute UTI   Acute mildly displaced fracture of right pubic bone    History of Present Illness   Dustin Pearce is a 89 year old male who had a fall at ~1 pm at his long-term care facility, [facility].  Patient fell, apparently hit his head, though was subsequently ambulating.  Patient significant other, Halina, visited with patient later in the afternoon, and he was noted to have some mental slowing as compared to his baseline, pulling at his mask and needing to be redirected where this typically would not be the case per her report.  With this, patient was transported the emergency department for evaluation.  In the ER, patient had his chronic indwelling Luis catheter exchanged and a urinalysis was obtained demonstrating pyuria, hematuria.  His creatinine was reported as mildly elevated from baseline at 0.89.  On review of prior values, his baseline actually appears to be close to 0.75 range, though did have 2 more recent values, 1 of which was at the end of an observation hospitalization, in the 0.6 range.     Halina tells me that patient has not had any recent complaints or changes in his status.  Recently hospitalized for falls with left shoulder dislocation, though has multiple recent falls, and was actually admitted by myself 6/11/2020.  Several recent ZIO patch monitoring series with patient discontinued from beta-blockade after no atrial fibrillation noted and concern with falls associated with orthostasis.  Repeat hospitalization with possible TIA and fall resulting in repeat ZIO patch monitoring with some nonsustained ventricular tachycardia which was asymptomatic.   Following fall at approximately 1 PM what was thought that patient might have hit his head, Halina actually met with him around the 3-4:00 hour for a weekly 20-minute visit.  She has been talking with him on the phone approximately 4 times per day prior to this, though when he came to his visit, he had clear mental slowing.  Was late for his visit, and Halina assumed that staff had to wake him up.     After cath the left, patient apparently had 2 episodes of emesis.  An exam was performed and it was noted that patient had anisocoria, with left pupil slightly larger than right.  In the setting of nausea with vomiting, anisocoria, mental slowing, and recent fall, patient was brought to the emergency department for evaluation.      Head CT without acute change, though urinalysis abnormal as above.  No fevers.     He reports no pain, no complaints.  Does have some CVA tenderness on percussion during my exam, though does not believe he has a kidney stone based on his current versus past experiences.  Urine with microscopic hematuria, though is not grossly bloody.       Hospital Course   Dustin Pearce was admitted on 8/4/2020.  The following problems were addressed during his hospitalization:    Active Problems:   He presents after an unwitnessed fall at his care facility, concerned that he may have hit his head in the setting of mental slowing and anisocoria.     Catheter related urinary tract infection:  Generalized weakness most likely due to UTI  Hypophosphatemia   -Abnormal urinalysis with hematuria, pyuria.   - Patient with decreased mental acuity/mental slowing in the setting of dementia .  Catheter was exchanged in the emergency department prior to specimen being obtained  -Continue ceftriaxone 1 g every 24 hours.  Can switch to cefuroxime to complete a course at discharge.  -urine culture with 2 strains of E. coli, low colony counts, susceptible to Ceftriaxone. .  -Given history of renal stones, hematuria, left  CVA tenderness with percussion, creatinine elevation from baseline, obtained noncontrast CT of abdomen and pelvis to rule out obstructive uropathy.  No hydronephrosis or perinephric stranding noted.     - 8/9 wbc remains at 12. New fever and persisting lethargy.   Lactate 2. New tachycadia  -cxR showedbilateral infultrates. Suspect aspiration pneumonia however. covid negative, worked up in covid isolation.     -discussed comfort cares option with pt's son and pt's significant other given his very poor prognosis, significant chronic medical issues, new pneumonia/new fever/new aspiration pneumonia/new SIRS, significant metobolic encephalopathy, poor po intake, high risk for recurrent hospitalization . Per family and his significant other of 40 years pt has had poor quality of life and functional decline this year. This is his 7th hospitalization this year. Pt would not want ongoing invassive cares, feeding tube or rehospitalization. Family agrees that pt is significantly declining.  Family and pt's significant other have no further questions and concerns and wish for pt to be dnr/dni and proceed with comfort care measures. They both are in complete agreement with comfort cares and is in line with patient's wishes as well. discussed comfort care measures in detail with pt's son and significant other.    -pt transitioned to comfort cares. He needed occasional ativan for tachpynea but appeared comfortable and no distress. After coming out of covid isolation his family was by his side. He passed on 8/11 with family present. Family/significant other without questions or concerns.             Acute toxic metabolic encephalopathy, likely related to catheter associated UTI  Difficult to determine degree of contribution given known underlying dementia.  -Fall precautions  -Keppra level slightly high at 48, lacosamide levels pending from the emergency department.  -Given slightly elevated Keppra level, continued  encephalopathy consulted neurology for recommendations regarding antiepileptics,?  Other causes of encephalopathy.  -Keppra dose was decreased to 500 mg a.m. and 750 mg p.m. by neurology.  -Lacosamide level returned slightly elevated at 10.3. no change to lacosamide dose per neurology  - No SZ activity, has been interactive to a degree. Taking po with assistance but then stops eating  -pt legally blind and [place]  - eating 0-25% of meals  -minimally interactive   - for me pt follows a few simple commands. nonfocal neuro exam  -encephalopathy persists.   -nl EEG 6/2020. Case reviewed with Gen Neurology 8/8 and enceph not likely 2/2 to meds. They do not suspect SZ activity. Consider repeat EEG tomorrow if not improving or clearly not interactive.   -nl TSH   -follow up Head CT - no acute findings.   -no sz activity.   -new fever 8/9, wbc remains at 12, lethargy   Na is up to 145  -SIRS by criteria  -not safe for po. Coughing  R base crackles on exam  Concern for aspiration pneumonia and confirmed on CXR with bilateral infultreates  covid negative. Negative on admission as well.   Remove isolation as suspect aspiration pneumonia        Plan:transitioned to comfort cares      Dysphagia  Aspiration pneumonia, bilateral  -Seen by speech pathology.    -Has been placed on DD 1 diet with nectar thickened liquids.  -pt with cough then developed fever and tachypnea, cXR showed bilateral infultrates.   - negative covid  -suspect aspiration pneumonia, very likely recurrent.    -not safe for po currently make npo  -comfort cares     Frequent falls:   -Essentially monthly hospitalizations for falls with various orthopedic injuries including left shoulder injury in June, July.   - Significant prior work-up including ZIO patch monitoring without significant arrhythmias, recent TTE with mild to moderate aortic stenosis, multiple prior EEGs without seizure activity     Occult pelvic fractures: Incidentally noted on CT abdomen pelvis.   Acute mildly displaced fracture of right pubic bone with suspected surrounding blood products, acute nondisplaced vertical right iliac wing fracture.  Patient with no complaints of pain, though history limited by dementia.  -Orthopedic surgery consulted for trauma evaluation during stay  -They recommended nonoperative management with WBAT with walker. Follow-up with [facility] at [facility] in 6 weeks for recheck.  Call [phone] for appointment.  -not having much pain. Some with repositioning  -Hemoglobin at 9.3 [11.9 at admission].  Continue to monitor  -prn tylenol  -transitioned to comfort cares. No clear pain issues.      Acute on chronic anemia, possibly secondary to blood loss with pelvic fractures:   - Patient presents with a hemoglobin of 11.9.  In the setting of pelvic fractures with possible intrapelvic blood loss, trending hemoglobin  -Hemoglobin trended down to 8.6.    -Repeated pelvic CT on 8/7 which showed stable fracture and small hematoma with no significant change from prior CT.   -Aspirin and Brilinta currently on hold given risk of pelvic bleed associated with occult fractures.    -Hb stable in 9 range.   -transitioned to comfort cares        History of seizure disorder:   -Follows with neurology, multiple fairly recent seizure evaluations without capture on EEG  -Neurology consulted for encephalopathy and seizure disorder  -Keppra level slightly elevated at 48 and lacosamide levels also slightly elevated at 10.3.    -neurology consulted to address lacosamide dosing --> given level only slightly above therapeutic range rec'd continuing same dose.   - -discussed antiepileptic drugs with neurology in setting of comfort care and likely decreasing renal fx  - prn ativan for sz  - reduced vimpat to 100mg every day - change to IV dosing, equivelent with po dosiing, discussed with neurology  -  reduced keppra to 500mg bid today, per neurology     Peripheral arterial disease:  Coronary artery disease with  associated ischemic cardiomyopathy: Most recent ejection fraction April 2020 with 30 to 35%, moderate aortic stenosis  History of CVA:  Carotid artery stenosis: Status post right carotid enterectomy  Nonsustained ventricular tachycardia: Max 17 beat run on recent ZIO patch monitoring.  No symptoms at that time, though cannot rule out more persistent V. tach episodes resulting in falls.  Would not necessarily explain encephalopathy currently noted.     Paroxysmal atrial fibrillation: Possible.  Not noted on recent ZIO patch monitoring  -Prior to admission aspirin and Brilinta on hold given intra-abdominal pelvic bleed associated with occult fractures  -Continued prior to admission atorvastatin  -Remains off of beta-blockade following recent cardiology ZIO patch monitoring and concern for orthostatic falls.  - transitioned to comfort cares       Type 2 diabetes:   Covid negative 8/4/20 and 8/9  Taken out of isolation. Suspect aspiration pneumonia     Diet:  not safe for po at this time. Risk for aspiration.      DVT Prophylaxis:comfort care  Luis Catheter: in place, indication:  Chronic urinary retention  Code status. Dnr/dni. Comfort cares  Transitioned to comfort cares on 8/9.   Discussed with palliative care today and they had rec'd comfort cares in past as well. No acute comfort needs at this time  Cont AED as this time until death seems imminent.             Cause of death: aspiration pneumonia    Min Watson MD, MD       Pending Results   Unresulted Labs Ordered in the Past 30 Days of this Admission     Date and Time Order Name Status Description    8/9/2020 1035 Blood culture Preliminary     8/9/2020 1035 Blood culture Preliminary           Primary Care Physician   Matt Morrissey    Consultations This Hospital Stay   ORTHOPEDIC SURGERY IP CONSULT  PHYSICAL THERAPY ADULT IP CONSULT  SPEECH LANGUAGE PATH ADULT IP CONSULT  OCCUPATIONAL THERAPY ADULT IP CONSULT  NEUROLOGY IP CONSULT  NEUROLOGY IP  CONSULT  NUTRITION SERVICES ADULT IP CONSULT  PHARMACY IP CONSULT  PSYCHIATRY IP CONSULT  PALLIATIVE CARE ADULT IP CONSULT  SOCIAL WORK IP CONSULT    Time Spent on this Encounter   I, Min Watson MD, personally saw the patient today and spent greater than 30 minutes discharging this patient.    Data   Most Recent 3 CBC's:  Recent Labs   Lab Test 08/09/20  0732 08/08/20  0828 08/07/20  0704  08/05/20  0655  08/04/20 1942   WBC 12.6*  --   --   --  12.4*  --  16.3*   HGB 9.0* 9.5* 8.6*   < > 10.0*   < > 11.9*   MCV 90  --   --   --  88  --  89     --   --   --  242  --  302    < > = values in this interval not displayed.      Most Recent 3 BMP's:  Recent Labs   Lab Test 08/09/20 0732 08/08/20  0828 08/07/20  0704   * 139 139   POTASSIUM 5.1 4.5 4.5   CHLORIDE 117* 110* 110*   CO2 22 24 25   BUN 16 13 13   CR 0.54* 0.60* 0.67   ANIONGAP 6 5 4   ALLISON 8.1* 8.2* 7.7*   * 141* 157*     Most Recent 2 LFT's:  Recent Labs   Lab Test 08/05/20 0655 08/04/20 1942   AST 12 21   ALT 22 35   ALKPHOS 70 81   BILITOTAL 0.3 0.4     Most Recent INR's and Anticoagulation Dosing History:  Anticoagulation Dose History     Recent Dosing and Labs Latest Ref Rng & Units 6/30/2019 7/16/2019 8/24/2019 3/5/2020 4/18/2020 5/1/2020 7/4/2020    INR 0.86 - 1.14 1.01 0.98 0.94 1.07 1.08 1.03 1.02        Most Recent 3 Troponin's:  Recent Labs   Lab Test 06/11/20  0144 05/01/20  1005 04/19/20  0720  05/27/18  0119   TROPI 0.038 0.016 0.034   < >  --    TROPONIN  --   --   --   --  0.00    < > = values in this interval not displayed.     Most Recent Cholesterol Panel:  Recent Labs   Lab Test 07/05/20  0845   CHOL 97   LDL 26   HDL 51   TRIG 99     Most Recent 6 Bacteria Isolates From Any Culture (See EPIC Reports for Culture Details):  Recent Labs   Lab Test 08/09/20  1101 08/09/20  1056 08/04/20  1943 05/01/20  1300 05/01/20  1020 05/01/20  1005   CULT No growth after 2 days No growth after 2 days 10,000 to 50,000  colonies/mL  Escherichia coli  *  <10,000 colonies/mL  Strain 2  Escherichia coli  *  <10,000 colonies/mL  Pseudomonas aeruginosa  * No growth No growth No growth     Most Recent TSH, T4 and A1c Labs:  Recent Labs   Lab Test 08/06/20  0745 06/01/20  1022  06/03/18  0520   TSH 1.12  --   --  4.45*   T4  --   --   --  0.98   A1C  --  6.3*   < >  --     < > = values in this interval not displayed.     Results for orders placed or performed during the hospital encounter of 08/04/20   Head CT w/o contrast    Narrative    CT SCAN OF THE HEAD WITHOUT CONTRAST   8/4/2020 8:56 PM     HISTORY: Patient fell.    TECHNIQUE:  Axial images of the head and coronal reformations without  IV contrast material. Radiation dose for this scan was reduced using  automated exposure control, adjustment of the mA and/or kV according  to patient size, or iterative reconstruction technique.    COMPARISON: MR scan dated 7/5/2020    FINDINGS:     Intracranial contents: There is diffuse parenchymal volume loss.   White matter changes are present in the cerebral hemispheres that are  consistent with small vessel ischemic disease in this age patient.  There is a chronic area of encephalomalacia in the left frontal lobe.  Small areas of encephalomalacia are also seen in the cerebellum There  is no evidence of intracranial hemorrhage, mass, acute infarct or  anomaly.    Visualized orbits/sinuses/mastoids:  The visualized portions of the  sinuses and mastoids appear normal.    Osseous structures/soft tissues:  There is no evidence of trauma. No  intracranial hemorrhage or skull fractures are identified.      Impression    IMPRESSION: No intracranial bleed or skull fractures.      KAM PHAN MD   XR Chest 2 Views    Narrative    CHEST TWO VIEWS   8/4/2020 9:30 PM     HISTORY: Fall    COMPARISON: Chest x-ray on 7/4/2020      Impression    IMPRESSION: AP and lateral views of the chest were obtained.  Cardiomediastinal silhouette is within normal limits.  Atherosclerotic  vascular calcification of the thoracic aorta and its major branches.  No suspicious focal pulmonary opacities. No significant pleural  effusion or pneumothorax. Minimal displaced fracture of the  anterolateral aspect of the left sixth rib. Diffuse osteopenia with  multilevel degenerative changes of the spine.    KISHORE TAVAREZ MD   CT Abdomen Pelvis w/o Contrast    Narrative    EXAM: CT ABDOMEN PELVIS W/O CONTRAST  LOCATION: [facility]  DATE/TIME: 8/4/2020 10:54 PM    INDICATION: UTI, history of kidney stones, hematuria, left flank pain.    COMPARISON: 6/1/2020.    TECHNIQUE: CT scan of the abdomen and pelvis was performed without IV contrast. Multiplanar reformats were obtained. Dose reduction techniques were used.    CONTRAST: None.    FINDINGS:   LOWER CHEST: Patchy acute-appearing ground-glass infiltrate right lower lung concerning for pneumonitis. Clinical correlation.    HEPATOBILIARY: Liver is negative. Gallbladder appears absent. No biliary dilatation.    PANCREAS: Normal.    SPLEEN: Normal.    ADRENAL GLANDS: Normal.    KIDNEYS/BLADDER: There are a combination of multiple nonobstructing stones in both kidneys as well as vascular calcification. The largest stone in the right kidney measures 7 mm in diameter and the largest stone in the left kidney measures 9 mm. There is   a 5.3 cm complex cyst in the left kidney posteriorly containing a partially calcified septation which is unchanged compared to the prior study. No ureteral stones or hydronephrosis. Luis catheter in the bladder which is decompressed.    BOWEL: No evidence for bowel obstruction. Normal appendix. Large amounts of stool in the colon. Sigmoid diverticulosis without evidence for diverticulitis.    LYMPH NODES: Normal.    VASCULATURE: Diffuse aortoiliac atheromatous disease. Mild aneurysmal dilatation of the abdominal aorta measuring up to 3.2 cm.    PELVIC ORGANS: Normal.    MUSCULOSKELETAL: There is an acute  mildly displaced fracture at the junction of the body of the right pubic bone and right superior pubic ramus. There is a subtle vertically oriented nondisplaced fracture in the right iliac wing superiorly adjacent to   the SI joint. Mild compression fracture superior endplate of the L1 vertebral body. This was also present previously but appears slightly greater today. Clinical correlation. Grade 2 anterolisthesis and degenerative change of L5 on S1.    Small amount of high-density fluid and stranding seen in the lower pelvis anteriorly adjacent to the fracture site and extending into the space of Retzius likely reflecting hematoma or blood products related to the fracture.      Impression    IMPRESSION:   1.  There is an acute mildly displaced fracture involving the right pubic bone near the junction of the body and right superior pubic ramus. Small amount of surrounding high-density fluid and stranding including some extension into the space of Retzius   likely reflecting blood products related to the fracture. Clinical correlation.    2.  Additional acute nondisplaced vertically oriented fracture in the right iliac wing superiorly.    3.  Slight compression superior endplate of the L1 vertebral body. This was also present previously but appears slightly greater today. Clinical correlation.    4.  Nonobstructing intrarenal calculi bilaterally. No ureteral stones or hydronephrosis.    5.  Large amount of stool in the colon.    6.  Mild aneurysmal dilatation of the abdominal aorta measuring 3.2 cm.    7.  Patchy acute-appearing infiltrate right lower lung concerning for pneumonitis. Clinical correlation.     CT Pelvis Bone wo Contrast    Narrative    CT PELVIS BONE WITHOUT CONTRAST  8/7/2020 8:43 AM     HISTORY: Pelvic fracture, follow up; dropping hemoglobin, pelvic  fracture.    TECHNIQUE:  Axial images with reconstructions. No IV contrast.  Radiation dose for this scan was reduced using automated  exposure  control, adjustment of the mA and/or kV according to patient size, or  iterative reconstruction technique.    COMPARISON:  8/4/2020 vertical fracture.    FINDINGS:  Along the medial aspect of the right ilium; this is similar  to the previous exam. Again there is a comminuted fracture of the  right pubic symphysis and adjacent superior pubic ramus with some  displacement. Lumbar degenerative change and lumbosacral spondylolytic  spondylolisthesis. Chondrocalcinosis of the hips and symphysis pubis.  Minimal aneurysmal dilatation of the abdominal aorta with a transverse  diameter of 3 cm; this is incompletely imaged. Right common iliac  artery ectasia with a diameter of 1.7 cm. Colonic diverticulosis.  Bladder catheter with air/gas in the bladder. Right sacral fracture  which in retrospect was also present previously. Presacral edema.  Nonspecific subcutaneous edema. At the superior aspect of the right  pubic fracture, there is a 1.7 cm focal density on series 3 image 66  which presumably represents a small hematoma; this previously measured  1.5 cm.      Impression    IMPRESSION:  1. No significant change with respect to right iliac, sacral, and  pubic fractures. Small hematoma adjacent to the pubic symphysis  fracture.  2. Additional findings discussed above.    JOSE CORONADO MD   CT Head w/o Contrast    Narrative    CT OF THE HEAD WITHOUT CONTRAST   8/8/2020 12:17 PM     COMPARISON: Head CT 8/4/2020    HISTORY:  Altered level of consciousness (LOC), unexplained; ;  persisting encephalopathy, fall, rule out intracranial bleed     TECHNIQUE:  Axial CT images of the head from the skull base to the  vertex were acquired without IV contrast.    FINDINGS:   INTRACRANIAL CONTENTS: No intracranial hemorrhage, extraaxial  collection, or mass effect.  No CT evidence of acute infarct.    Encephalomalacia in the left frontal operculum and insula. Scattered  small chronic infarcts in the cerebellum. Severe presumed  chronic  small vessel ischemic change. Moderate generalized volume loss. These  findings are unchanged.    VISUALIZED ORBITS/SINUSES/MASTOIDS: No significant orbital  abnormality.  No significant paranasal sinus mucosal disease. No  significant middle ear or mastoid effusion.    OSSEOUS STRUCTURES/SOFT TISSUES: No significant abnormality.      Impression    IMPRESSION:  1.  No acute abnormality.  2.  Advanced chronic small vessel ischemic change and volume loss.  3.  Chronic infarcts in the left frontal lobe.  4.  Chronic small cerebellar infarcts.  5.  No change.    Radiation dose for this scan was reduced using automated exposure  control, adjustment of the mA and/or kV according to patient size, or  iterative reconstruction technique    JW CONNER MD   XR Chest Port 1 View    Narrative    XR CHEST PORT 1 VW   8/9/2020 11:15 AM     HISTORY: fever, cough, rule out infultrate    COMPARISON: 8/4/2020      Impression    IMPRESSION: Patchy bibasilar opacities concerning for multifocal  pneumonitis.    No pleural effusion or pneumothorax. Cardiac and mediastinal  silhouettes unremarkable.    GEE RODRIGUEZ MD

## 2020-08-11 NOTE — PROGRESS NOTES
Chart reviewed for f/u  Note pt is now comfort cares  No nutrition intervention at this time  Available if needed    Sena Serrano RD, LD  Clinical Dietitian - [facility]   Pager - [phone]

## 2020-08-11 NOTE — PLAN OF CARE
Pt noted to have apneic breathing, appeared comfortable. RN administered PRN medications as necessary to assist with comfort. Dagoberto/Repo Q 2H. Pt passed away 1305. Family at bedside.

## 2020-08-15 LAB
BACTERIA SPEC CULT: NO GROWTH
BACTERIA SPEC CULT: NO GROWTH
Lab: NORMAL
SPECIMEN SOURCE: NORMAL
SPECIMEN SOURCE: NORMAL

## 2021-03-15 NOTE — LETTER
[place] CARE COORDINATION  [address]  [facility] [address]    July 23, 2019    Dustin Pearce  [address]  [facility] [address]      Dear Dustin,    I am a clinic care coordinator who works with Matt Morrissey MD at [facility] . I wanted to thank you for spending the time to talk with me.  I wanted to introduce myself and provide you with my contact information so that you can call me with questions or concerns about your health care. Below is a description of clinic care coordination and how I can further assist you.     The clinic care coordinator is a registered nurse and/or  who understand the health care system. The goal of clinic care coordination is to help you manage your health and improve access to the [facility] system in the most efficient manner. The registered nurse can assist you in meeting your health care goals by providing education, coordinating services, and strengthening the communication among your providers. The  can assist you with financial, behavioral, psychosocial, chemical dependency, counseling, and/or psychiatric resources.    Please feel free to contact me at [phone], with any questions or concerns. We at [facility] are focused on providing you with the highest-quality healthcare experience possible and that all starts with you.     Sincerely,     Farnaz Eddy RN, Care Coordinator   [facility] -Care Coordination  [facility] , [facility] and [facility]   [phone]     Enclosed: I have enclosed a copy of the Complex Care Plan. This has helpful information and goals that we have talked about. Please keep this in an easy to access place to use as needed.   Continued Stay Note  [facility]     Patient Name: Charu Schroeder  MRN: [id number]  Today's Date: 3/15/2021    Admit Date: 3/14/2021    Discharge Plan     Row Name 03/15/21 1633       Plan    Plan Comments  Reviewed clinicals, CCP called [facility] for bed status. Await call back. Palliative consulted. CCP to follow up tomorrow. shine patton/ccp        Discharge Codes    No documentation.             Angelia Henson, RN

## 2021-07-13 NOTE — PROGRESS NOTES
The following letter pertains to your most recent diagnostic tests:    Good news! Your abdominal aortic aneurysm has not changed much since 12/2016.  We should recheck in one year.        Sincerely,    Dr. Morrissey Topical Sulfur Applications Counseling: Topical Sulfur Counseling: Patient counseled that this medication may cause skin irritation or allergic reactions.  In the event of skin irritation, the patient was advised to reduce the amount of the drug applied or use it less frequently.   The patient verbalized understanding of the proper use and possible adverse effects of topical sulfur application.  All of the patient's questions and concerns were addressed.

## 2021-11-01 NOTE — PLAN OF CARE
Impression: Presbyopia: H52.4. Plan: A new glasses prescription has been discussed and generated. Patient to call with any concerns. Pt here with CVA vs. Seizure. Disoriented x2, only aware of place and self. Alert and following commands appropriately. Baseline blurry vision and [place], otherwise neuros intact. VSS. Tele SR w/ BBB. Up with assist x1 w/ GB/W. Stood at bedside to use urinal. PVR >300mL. Rested and then reprompted. Tolerating DD2 diet with thin water, no straws. Discharge pending, therapies recommending TCU.

## 2022-02-17 PROBLEM — K21.9 GASTROESOPHAGEAL REFLUX DISEASE: Status: ACTIVE | Noted: 2020-01-01

## 2022-02-17 NOTE — PROGRESS NOTES
[facility]    Hospitalist Progress Note    Date of Service (when I saw the patient): 06/02/2017    Assessment & Plan   Dustin Pearce is a 89 year old male who was admitted on 5/31/2017.     1.  Left foot diabetic ulcer with cellulitis, concerning for osteomyelitis  MRI very suspicious for osteomyelitis  On Zosyn. BC NGTD. Appreciate ID and Podiatry input.     Vascular surgery consulted.       2. Peripheral arterial disease  KRYSTINA suggests significant arterial insufficiency:     Vascular surgery consulted.       3. DM II with peripheral neuropathy:improved control.      Ref. Range 6/1/2017 06:56 6/1/2017 08:59 6/1/2017 17:58 6/1/2017 21:33 6/2/2017 02:04 6/2/2017 08:57   Glucose Latest Ref Range: 70 - 99 mg/dL 164 (H) 154 (H) 112 (H) 179 (H) 157 (H) 175 (H)       High intensity ISS. Cotninue glipizide. Hold metformin.     4. CAD, cerebrovascular disease  On asa, lipitor, lisinopril, metoprolol. Will hold plavix for now as surgery is likely    5. Afib  On metoprolol  Not on anticoagulation    6. Depression: on amitryptiline and duloxetine.     7. GERD: continue omeprazole    8. BPH:  Continue flomax.     DVT Prophylaxis: Pneumatic Compression Devices  Code Status: Full Code    Disposition: Expected discharge in 3+ days    Updated patient's significant other deana delgado by phone today at length. All of her questions were answered to her satisfaction.     Time 40 minutes.     Min Garcia MD  [phone] (P)  Text Page (7 am to 6 pm)    Interval History      Seen with V[facility] Gagnon    Feels about the same. Hasn't yet met the vascular team.     Discussed ongoing need for abx and probable surgery.     -Data reviewed today: I reviewed all new labs and imaging results over the last 24 hours. I personally reviewed no images or EKG's today.    Physical Exam   Temp: 96.9  F (36.1  C) Temp src: Oral BP: 151/50   Heart Rate: 63 Resp: 16 SpO2: 93 % O2 Device: None (Room air)    Vitals:    05/31/17 1724 06/01/17  0500 06/02/17 0652   Weight: 73 kg (161 lb) 75.6 kg (166 lb 11.2 oz) 75.8 kg (167 lb 1.7 oz)     Vital Signs with Ranges  Temp:  [96.2  F (35.7  C)-96.9  F (36.1  C)] 96.9  F (36.1  C)  Heart Rate:  [55-66] 63  Resp:  [16-18] 16  BP: (126-160)/(50-65) 151/50  SpO2:  [91 %-98 %] 93 %  I/O last 3 completed shifts:  In: 360 [P.O.:360]  Out: -     Constitutional: No acute distress  Respiratory: Clear to auscultation bilaterally, no crackles  Cardiovascular: Regular rate and rhythm, S1, S2, no murmurs  GI: Abdomen soft, nontender, nondistended, bowel sounds are heard  Skin/Integumen: erythema on left foot is marginally improved.   NEURO:  Diminished sensation in EXT    Medications        insulin aspart  1-10 Units Subcutaneous TID AC     insulin aspart  1-7 Units Subcutaneous At Bedtime     vancomycin (VANCOCIN) IV  1,250 mg Intravenous Once     aspirin  81 mg Oral Daily     atorvastatin (LIPITOR) tablet 80 mg  80 mg Oral Daily     clopidogrel (PLAVIX) tablet 75 mg  75 mg Oral Daily     DULoxetine  30 mg Oral Daily     glipiZIDE  10 mg Oral BID AC     lisinopril (PRINIVIL/Zestril) tablet 2.5 mg  2.5 mg Oral Daily     ipratropium  2 spray Both Nostrils Q12H     metoprolol  50 mg Oral Daily     omeprazole  40 mg Oral Daily     psyllium  1 packet Oral Daily     tamsulosin  0.4 mg Oral Daily     sodium chloride (PF)  3 mL Intracatheter Q8H     piperacillin-tazobactam  3.375 g Intravenous Q6H       Data     Recent Labs  Lab 06/02/17  0707 06/01/17  0656 05/31/17  1800   WBC  --  8.0 9.5   HGB  --  11.5* 12.4*   MCV  --  95 96   PLT  --  158 192   NA  --  141 139   POTASSIUM  --  4.6 4.4   CHLORIDE  --  105 103   CO2  --  33* 30   BUN  --  12 15   CR 0.66 0.64* 0.62*   ANIONGAP  --  3 6   ALLISON  --  8.7 8.8   GLC  --  164* 249*       Recent Results (from the past 24 hour(s))   US KRYSTINA Doppler No Exercise    Narrative    US KRYSTINA DOPPLER NO EXERCISE  6/1/2017 2:19 PM     HISTORY: L foot ulceration, infection    COMPARISON:  None.    FINDINGS:   The resting KRYSTINA on the left lower extremity is 0.65. A resting KRYSTINA in  the right lower extremity could not be obtained due to vessel  noncompressibility.    Waveform analysis significantly dampened distal tibial waveforms.      Impression    IMPRESSION: Findings would indicate significant lower extremity  arterial insufficiency. In the setting of a nonhealing left foot  ulcer, further evaluation with an angiogram could be performed.      LIDIA YARBROUGH MD        Quality 431: Preventive Care And Screening: Unhealthy Alcohol Use - Screening: Patient not identified as an unhealthy alcohol user when screened for unhealthy alcohol use using a systematic screening method Detail Level: Detailed Quality 226: Preventive Care And Screening: Tobacco Use: Screening And Cessation Intervention: Patient screened for tobacco use and is an ex/non-smoker Quality 130: Documentation Of Current Medications In The Medical Record: Current Medications Documented

## 2022-03-15 NOTE — PROGRESS NOTES
ARABELLA    I: SW received confirmation patient is ready for discharge today. SW faxed orders and PAS to facility. [facility] transport aide will pick patient up at 10:30 today.     P: No further SW interventions anticipated at this time.  PAS-RR    D: Per DHS regulation, SW completed and submitted PAS-RR to [place] [employer] Direct Connect via the Senior LinkAge Line.  PAS-RR confirmation # is : [id number]    I: SW spoke with patient and they are aware a PAS-RR has been submitted.  SW reviewed with patient that they may be contacted for a follow up appointment within 10 days of hospital discharge if their SNF stay is < 30 days.  Contact information for [facility] LinkAge Line was also provided.    A: patient verbalized understanding.    P: Further questions may be directed to [facility] LinkAge Line at #[phone], option #4 for PAS-RR staff.     Chart(s)/Patient

## 2022-05-06 NOTE — PLAN OF CARE
Faxed completed RFI to [address] @ [phone] Problem: Patient Care Overview  Goal: Plan of Care/Patient Progress Review  Discharge Planner PT   Patient plan for discharge: rehab or home  Current status: Pt requires SBA for bed mobility, transfers and gait 250' x 2 with a FWW. Transfers without a device require CGA for balance.  Barriers to return to prior living situation: level of assist required, fall risk, below baseline level of mobility  Recommendations for discharge: TCU  Rationale for recommendations: Pt would benefit from daily PT at TCU to progress mobility, balance and strength so he can return home independent with mobility.       Entered by: Zohreh Anderson 02/09/2018 2:19 PM

## 2022-05-17 NOTE — PROGRESS NOTES
[facility]    Chief Complaint   Patient presents with     RECHECK       HPI:    Dustin Pearce is a 89 year old  (1/31/1928), who is being seen today for an episodic care visit at [facility].  HPI information obtained from: facility chart records.  Today's concern is:       Diabetic ulcer of left foot associated with diabetes mellitus due to underlying condition (H)  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):running mostly mid to high 100's and occas > 200  Benign essential hypertension: SBP mostly 110-120's SBP    Physical deconditioning: NWB left foot  Diarrhea, unspecified type:  No  loose BM since Saturday.   Nausea: gone    No CP, SOB, Cough, dizziness, fevers, chills, HA, N/V, dysuria. BM--see above. Appetite is fair.  No pain.       ALLERGIES: Oxycodone; Sulfa drugs; and Tetracycline  Past Medical, Surgical, Family and Social History reviewed and updated in [facility].    Current Outpatient Prescriptions   Medication Sig Dispense Refill     HYDROcodone-acetaminophen (NORCO) 5-325 MG per tablet Take 1 tablet by mouth every 4 hours as needed for moderate to severe pain Give 1 tablet by mouth every 4 hours as needed for Pain 1-5/10 AND Give 2 tablet by mouth every 4 hours as needed for Pain 6-10/10       loperamide (IMODIUM) 2 MG capsule Take 2 mg by mouth 2 times daily as needed for diarrhea BID PRN for loose stool but let NP know if used more than 1x or if stool are VERY LOOSE.       LISINOPRIL PO Take 2.5 mg by mouth daily Hold if SBP is less than 110. Call NP if held 2x in a row or symptomatic       AMITRIPTYLINE HCL PO Take 25 mg by mouth nightly as needed for sleep       ipratropium (ATROVENT) 0.03 % spray Spray 2 sprays into both nostrils every 12 hours       DULoxetine (CYMBALTA) 30 MG EC capsule Take 1 capsule (30 mg) by mouth daily 90 capsule 3     glipiZIDE (GLUCOTROL) 10 MG tablet Take 1 tablet (10 mg) by mouth 2 times daily (before meals) 180 tablet 3      When asked if patient took medication? Patient states Yes    Per Np. Jonas instructions patient presents today for Blood Pressure check. Patient seated and resting 15 min with both feet flat on the floor. Blood pressure taking and documented . Reported blood pressure to NP. Angus Shone.     Vitals:    05/17/22 0925   BP: 132/65   Pulse: 96   Resp: 18   Temp: 98.2 °F (36.8 °C)   TempSrc: Temporal   SpO2: 99%   Weight: 226 lb (102.5 kg)   Height: 5' 4\" (1.626 m) metoprolol (TOPROL-XL) 50 MG 24 hr tablet Take 1 tablet (50 mg) by mouth daily (Patient taking differently: Take 50 mg by mouth daily Hold if SBP is less 100 and HR is less than 55. Call NP if held 2x in a row or symptomatic) 90 tablet 3     omeprazole (PRILOSEC) 40 MG capsule Take 1 capsule (40 mg) by mouth daily Take 30-60 minutes before a meal. 90 capsule 3     tamsulosin (FLOMAX) 0.4 MG capsule Take 1 capsule (0.4 mg) by mouth daily 90 capsule 3     aspirin EC 81 MG EC tablet Take 1 tablet (81 mg) by mouth daily 30 tablet 0     METFORMIN HCL PO Take 1,000 mg by mouth 2 times daily (with meals)       ATORVASTATIN CALCIUM PO Take 80 mg by mouth daily       blood glucose monitoring (NO BRAND SPECIFIED) test strip Use to test blood sugar three times daily or as directed. 300 each 3     order for DME Equipment being ordered: True Matrix Blood Glucose meter. 1 Device 0     Psyllium (METAMUCIL PO) Take 1 packet by mouth daily as needed        Clopidogrel Bisulfate, [id number], (PLAVIX PO) Take 75 mg by mouth daily        Medications reviewed:  Medications reconciled to facility chart and changes were made to reflect current medications as identified as above med list. Below are the changes that were made:   Medications stopped since last EPIC medication reconciliation:   There are no discontinued medications.    Medications started since last [facility] medication reconciliation:  No orders of the defined types were placed in this encounter.        REVIEW OF SYSTEMS:  10 point ROS of systems including Constitutional, Eyes, Respiratory, Cardiovascular, Gastroenterology, Genitourinary, Integumentary, Muscularskeletal, Psychiatric were all negative except for pertinent positives noted in my HPI.    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 Left foot has drsg-see below.  ABDOMEN:  normal bowel sounds, soft, nontender.  M/S:   FERNANDEZ equally, left foot has drsg up past ankle. Toes are warm, pink and +CSM  SKIN:  Inspection of skin and subcutaneous tissue baseline, left foot drsg is dry and intact  NEURO:   Cranial nerves 2-12 are  grossly intact and at patient's baseline  PSYCH:  oriented X 3, ?memory impaired , affect and mood normal    BP: 06/20-06/27: /52-78 mmHg  BGL: 06/14-06/21:  mg/dL    Recent Labs:    CBC RESULTS:   Recent Labs   Lab Test 06/13/17 06/09/17   0535  06/08/17 0613   WBC   --   7.9  8.7   RBC   --   3.53*  3.58*   HGB  10.7*  10.8*  10.9*   HCT   --   32.8*  33.2*   MCV   --   93  93   MCH   --   30.6  30.4   MCHC   --   32.9  32.8   RDW   --   13.9  13.6   PLT   --   214  187       Last Basic Metabolic Panel:  Recent Labs   Lab Test 06/13/17 06/09/17   0535  06/08/17 0613   NA  142   --   138   POTASSIUM  4.6   --   3.8   CHLORIDE  107   --   104   ALLISON  9.4   --   8.8   CO2  28   --   28   BUN  20   --   12   CR  0.68*   --   0.62*   GLC  108  162*  141*           ASSESSMENT / PLAN:    (E08.621,  L97.529) Diabetic ulcer of left foot associated with diabetes mellitus due to underlying condition (H)  (primary encounter diagnosis)   (E11.42) 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)  Comment/Plan: Sugars good so no changes to DM meds.  He goes to Dr Ann 6/29 for 2nd post op check,  He is healing well. Monitor.        (I10) Benign essential hypertension  Comment: 110-120 SBP mostly, reasonably controlled.  Will cont current POC--has hold parameters for  lisinopril and metoprolol    (R19.7) Diarrhea, unspecified type  Comment:  Has been resolved--he said had one Saturday though. Will add prn imodium.    (R11.0) Nausea  Comment: resolved,  Monitor.    (R53.81) Physical deconditioning  Comment: is non wt bearing left foot. No LCD and cont with PT and OT.       Electronically signed by  ASPEN Montes CNP

## 2022-11-10 NOTE — ED NOTES
"[facility]  ED Nurse Handoff Report    ED Chief complaint: Neurologic Problem      ED Diagnosis:   Final diagnoses:   None       Code Status: to be assessed by admitting provider     Allergies:   Allergies   Allergen Reactions     Oxycodone Other (See Comments)     \"TERRIBLE SWEATING\"     Sulfa Drugs      Tetracycline        Patient Story: Patient arrives to the ED today for a second visit within just a few hours due to having a second episode of slurred speech, altered level of consciousness, and weakness after patient was evaluated and discharged from the hospital after his similar episode. Patients mental status returned to baseline just prior to arrival in the ED.   Focused Assessment:    Resp: WDL   Cardiac:hypertension  Neuro: altered LOC and mental status, slurred speech and weakness which has improved   GI: WDL   : WDL   Musculoskeletal: Weakness     Treatments and/or interventions provided: keppra   Patient's response to treatments and/or interventions: remains stable     To be done/followed up on inpatient unit:  continue to monitor     Does this patient have any cognitive concerns?: Short term memory loss    Activity level - Baseline/Home:  Stand with Assist  Activity Level - Current:   Stand with assist x2    Patient's Preferred language: English   Needed?: No    Isolation: None  Infection: Not Applicable  Bariatric?: No    Vital Signs:   Vitals:    03/05/20 2028 03/05/20 2100 03/05/20 2130   BP: (!) 144/67 134/71 (!) 171/82   Pulse: 86 89 85   Temp: 97.6  F (36.4  C)     TempSrc: Oral     SpO2: 96%         Cardiac Rhythm:     Was the PSS-3 completed:   Yes  What interventions are required if any?               Family Comments: wife at bedside. Very informed of care   OBS brochure/video discussed/provided to patient/family: Yes              Name of person given brochure if not patient: n.a              Relationship to patient: n.a    For the majority of the shift this " patient's behavior was Green.   Behavioral interventions performed were none .    ED NURSE PHONE NUMBER: *[id number]          Negative

## 2024-03-16 NOTE — PLAN OF CARE
SLP: Orders received and chart reviewed. Patient admitted under OBS care for possible stroke or TIA. He is known to this department on past admissions and for an OP video swallow study completed 12/12/16 with minimal to mild deficits. Patient passed the swallow screen and is on a modified carb diet. Per his nurse and wife tolerated without difficulty. She stated his speech/language has improved and is near his baseline, but still below his baseline.   He currently was having an ECHO at bedside. Will reschedule for 4/9/19.    Yes

## 2025-06-03 NOTE — PROVIDER NOTIFICATION
"Dr. Rose paged, \"Pt had no acute events overnight. EEG being taken off and TCU  needs orders by 9am for 1030 discharge. Can you come round and place orders please? Thank you!\"  "
Detail Level: Zone
Render In Strict Bullet Format?: No
Plan: While waiting on humira will take minocycline 100 mg 2 refills, pt will hold on on humira until she gets tb done
Continue Regimen: Humira

## (undated) DEVICE — PACK EXTREMITY SOP15EXFSD

## (undated) DEVICE — SU SILK 4-0 TIE 12X30" A303H

## (undated) DEVICE — PREP DURAPREP 06ML APL 8635

## (undated) DEVICE — DRSG ADAPTIC 3X8" 6113

## (undated) DEVICE — BNDG ELASTIC 3"X5YDS UNSTERILE 6611-30

## (undated) DEVICE — SOL NACL 0.9% IRRIG 1000ML BOTTLE 07138-09

## (undated) DEVICE — SU VICRYL 3-0 SH 27" J316H

## (undated) DEVICE — PACK VASCULAR SCV15VAFSB

## (undated) DEVICE — NDL 19GA 1.5"

## (undated) DEVICE — LINEN TOWEL PACK X5 5464

## (undated) DEVICE — NDL ANGIOCATH 20GA 1.25" 4056

## (undated) DEVICE — DRSG KERLIX 4 1/2"X4YDS ROLL 6715

## (undated) DEVICE — BLADE KNIFE SURG 15 371115

## (undated) DEVICE — GLOVE PROTEGRITY MICRO 7.5 LATEX

## (undated) DEVICE — DRSG STERI STRIP 1/4X3" R1541

## (undated) DEVICE — DRAIN JACKSON PRATT RESERVOIR 100ML SU130-1305

## (undated) DEVICE — DRAIN JACKSON PRATT 15FR ROUND SU130-1323

## (undated) DEVICE — GLOVE PROTEXIS POWDER FREE 7.5 ORTHOPEDIC 2D73ET75

## (undated) DEVICE — EEG MONITORING SUPPLIES

## (undated) DEVICE — SU MONOCRYL 4-0 PS-2 18" UND Y496G

## (undated) DEVICE — SU PROLENE 7-0 BV-1DA 4X30" M8703

## (undated) DEVICE — GLOVE PROTEXIS W/NEU-THERA 7.5  2D73TE75

## (undated) DEVICE — SURGICEL HEMOSTAT 2X3" 1953

## (undated) DEVICE — DRAPE POUCH IRR 1016

## (undated) DEVICE — NDL 25GA 1.5" 305127

## (undated) DEVICE — DRAPE IOBAN INCISE 23X17" 6650EZ

## (undated) DEVICE — DRSG ABDOMINAL 07 1/2X8" 7197D

## (undated) DEVICE — BLADE SAW SAGITTAL 25.5X9.5X.4MM FINE LINVATEC 5023-138

## (undated) DEVICE — DRSG GAUZE 4X4" 3033

## (undated) DEVICE — BNDG ROLLER GAUZE CONFORM 4"X4YD 41-54

## (undated) DEVICE — DECANTER BAG 2002S

## (undated) DEVICE — SUCTION CANISTER MEDIVAC LINER 3000ML W/LID 65651-530

## (undated) DEVICE — NDL BLUNT 19GA 1.5"

## (undated) DEVICE — ESU GROUND PAD UNIVERSAL W/O CORD

## (undated) DEVICE — SU SILK 3-0 TIE 24" SA74H

## (undated) DEVICE — Device

## (undated) DEVICE — DRAPE STERI TOWEL LG 1010

## (undated) DEVICE — SU SILK 2-0 FSL 18" 677G

## (undated) DEVICE — SYR BULB IRRIG DOVER 60 ML LATEX FREE 67000

## (undated) DEVICE — DRAPE MINI C-ARM 4003

## (undated) DEVICE — SYR 03ML LL W/O NDL 309657

## (undated) DEVICE — DRSG XEROFORM 1X8"

## (undated) DEVICE — IMM LIMB ELEVATOR DC40-0203

## (undated) DEVICE — NDL 22GA 1.5"

## (undated) DEVICE — SU SILK 2-0 SH 30" K833H

## (undated) DEVICE — BLADE SAW OSCILLATING STRYK MED 9.0X25X0.38MM 2296-003-111

## (undated) DEVICE — SU SILK 2-0 TIE 24" SA75H

## (undated) DEVICE — CAST PADDING 4" STERILE 9044S

## (undated) DEVICE — BNDG ELASTIC 4"X5YDS UNSTERILE 6611-40

## (undated) DEVICE — SPECIMEN CULTURETTE DBL SWAB 220109

## (undated) DEVICE — GLOVE PROTEXIS BLUE W/NEU-THERA 7.5  2D73EB75

## (undated) DEVICE — SYR 10ML FINGER CONTROL W/O NDL 309695

## (undated) DEVICE — SOL NACL 0.9% INJ 250ML BAG 2B1322Q

## (undated) DEVICE — DRSG STERI STRIP 1/2X4" R1547

## (undated) DEVICE — SU PROLENE 6-0 C-1DA 30" 8706H

## (undated) DEVICE — PREP SKIN SCRUB TRAY 4461A

## (undated) DEVICE — SU ETHILON 4-0 FS-2 18" 662H

## (undated) RX ORDER — CEFAZOLIN SODIUM 2 G/100ML
INJECTION, SOLUTION INTRAVENOUS
Status: DISPENSED
Start: 2018-01-03

## (undated) RX ORDER — ALBUMIN, HUMAN INJ 5% 5 %
SOLUTION INTRAVENOUS
Status: DISPENSED
Start: 2018-01-03

## (undated) RX ORDER — PROPOFOL 10 MG/ML
INJECTION, EMULSION INTRAVENOUS
Status: DISPENSED
Start: 2018-01-03

## (undated) RX ORDER — BUPIVACAINE HYDROCHLORIDE 5 MG/ML
INJECTION, SOLUTION EPIDURAL; INTRACAUDAL
Status: DISPENSED
Start: 2017-06-04

## (undated) RX ORDER — BUPIVACAINE HYDROCHLORIDE 5 MG/ML
INJECTION, SOLUTION EPIDURAL; INTRACAUDAL
Status: DISPENSED
Start: 2017-06-06

## (undated) RX ORDER — CEFAZOLIN SODIUM 1 G/3ML
INJECTION, POWDER, FOR SOLUTION INTRAMUSCULAR; INTRAVENOUS
Status: DISPENSED
Start: 2018-01-03

## (undated) RX ORDER — HEPARIN SODIUM 1000 [USP'U]/ML
INJECTION, SOLUTION INTRAVENOUS; SUBCUTANEOUS
Status: DISPENSED
Start: 2018-05-30

## (undated) RX ORDER — ONDANSETRON 2 MG/ML
INJECTION INTRAMUSCULAR; INTRAVENOUS
Status: DISPENSED
Start: 2018-01-03

## (undated) RX ORDER — FENTANYL CITRATE 50 UG/ML
INJECTION, SOLUTION INTRAMUSCULAR; INTRAVENOUS
Status: DISPENSED
Start: 2018-05-30

## (undated) RX ORDER — LIDOCAINE HYDROCHLORIDE 10 MG/ML
INJECTION, SOLUTION INFILTRATION; PERINEURAL
Status: DISPENSED
Start: 2017-06-06

## (undated) RX ORDER — FENTANYL CITRATE 50 UG/ML
INJECTION, SOLUTION INTRAMUSCULAR; INTRAVENOUS
Status: DISPENSED
Start: 2018-01-03

## (undated) RX ORDER — KETAMINE HCL IN 0.9 % NACL 20 MG/2 ML
SYRINGE (ML) INTRAVENOUS
Status: DISPENSED
Start: 2017-06-04

## (undated) RX ORDER — ASPIRIN 600 MG/1
SUPPOSITORY RECTAL
Status: DISPENSED
Start: 2018-01-03

## (undated) RX ORDER — LIDOCAINE HYDROCHLORIDE 20 MG/ML
INJECTION, SOLUTION EPIDURAL; INFILTRATION; INTRACAUDAL; PERINEURAL
Status: DISPENSED
Start: 2018-01-03

## (undated) RX ORDER — REGADENOSON 0.08 MG/ML
INJECTION, SOLUTION INTRAVENOUS
Status: DISPENSED
Start: 2017-03-10

## (undated) RX ORDER — LIDOCAINE HYDROCHLORIDE 10 MG/ML
INJECTION, SOLUTION EPIDURAL; INFILTRATION; INTRACAUDAL; PERINEURAL
Status: DISPENSED
Start: 2018-05-30

## (undated) RX ORDER — CLOPIDOGREL 300 MG/1
TABLET, FILM COATED ORAL
Status: DISPENSED
Start: 2018-05-30

## (undated) RX ORDER — NITROGLYCERIN 5 MG/ML
VIAL (ML) INTRAVENOUS
Status: DISPENSED
Start: 2018-05-30

## (undated) RX ORDER — FENTANYL CITRATE 50 UG/ML
INJECTION, SOLUTION INTRAMUSCULAR; INTRAVENOUS
Status: DISPENSED
Start: 2017-06-04

## (undated) RX ORDER — BUPIVACAINE HYDROCHLORIDE 5 MG/ML
INJECTION, SOLUTION EPIDURAL; INTRACAUDAL
Status: DISPENSED
Start: 2018-01-03

## (undated) RX ORDER — ONDANSETRON 2 MG/ML
INJECTION INTRAMUSCULAR; INTRAVENOUS
Status: DISPENSED
Start: 2017-06-04

## (undated) RX ORDER — FENTANYL CITRATE 50 UG/ML
INJECTION, SOLUTION INTRAMUSCULAR; INTRAVENOUS
Status: DISPENSED
Start: 2017-06-06

## (undated) RX ORDER — VERAPAMIL HYDROCHLORIDE 2.5 MG/ML
INJECTION, SOLUTION INTRAVENOUS
Status: DISPENSED
Start: 2018-05-30

## (undated) RX ORDER — VECURONIUM BROMIDE 1 MG/ML
INJECTION, POWDER, LYOPHILIZED, FOR SOLUTION INTRAVENOUS
Status: DISPENSED
Start: 2018-01-03